# Patient Record
Sex: FEMALE | Race: WHITE | Employment: OTHER | URBAN - METROPOLITAN AREA
[De-identification: names, ages, dates, MRNs, and addresses within clinical notes are randomized per-mention and may not be internally consistent; named-entity substitution may affect disease eponyms.]

---

## 2017-05-16 ENCOUNTER — HOSPITAL ENCOUNTER (OUTPATIENT)
Dept: NON INVASIVE DIAGNOSTICS | Age: 77
Discharge: HOME | End: 2017-05-16
Attending: INTERNAL MEDICINE | Admitting: INTERNAL MEDICINE

## 2017-05-16 DIAGNOSIS — R68.89 OTHER GENERAL SYMPTOMS AND SIGNS: ICD-10-CM

## 2017-05-25 LAB
ACQUISITION DURATION: NORMAL S
AVERAGE HEART RATE: 93 BPM
EKG DIAGNOSIS: NORMAL
FASTEST SUPRAVENTRICULAR RATE: 149 BPM
HOLTER MAX HEART RATE: 118 BPM
HOOKUP DATE: NORMAL
HOOKUP TIME: NORMAL
LONGEST SUPRAVENTRICULAR RATE: 149 BPM
Lab: NORMAL
MAX HEART RATE TIME/DATE: NORMAL
MIN HEART RATE TIME/DATE: NORMAL
MIN HEART RATE: 64 BPM
NUMBER OF FASTEST SUPRAVENTRICULAR BEATS: 13
NUMBER OF LONGEST SUPRAVENTRICULAR BEATS: 13
NUMBER OF QRS COMPLEXES: NORMAL
NUMBER OF SUPRAVENTRICULAR BEATS IN RUNS: 26
NUMBER OF SUPRAVENTRICULAR COUPLETS: 1
NUMBER OF SUPRAVENTRICULAR ECTOPICS: 234
NUMBER OF SUPRAVENTRICULAR ISOLATED BEATS: 206
NUMBER OF SUPRAVENTRICULAR RUNS: 3
NUMBER OF VENTRICULAR BEATS IN RUNS: 0
NUMBER OF VENTRICULAR BIGEMINAL CYCLES: 0
NUMBER OF VENTRICULAR COUPLETS: 4
NUMBER OF VENTRICULAR ECTOPICS: 128
NUMBER OF VENTRICULAR ISOLATED BEATS: 120
NUMBER OF VENTRICULAR RUNS: 0

## 2017-06-07 ENCOUNTER — HOSPITAL ENCOUNTER (OUTPATIENT)
Dept: MAMMOGRAPHY | Age: 77
Discharge: HOME | End: 2017-06-07
Attending: INTERNAL MEDICINE | Admitting: INTERNAL MEDICINE

## 2017-06-07 DIAGNOSIS — Z87.891 PERSONAL HISTORY OF NICOTINE DEPENDENCE: ICD-10-CM

## 2017-06-07 DIAGNOSIS — Z12.31 VISIT FOR SCREENING MAMMOGRAM: ICD-10-CM

## 2017-12-29 PROBLEM — J44.1 COPD EXACERBATION (HCC): Status: ACTIVE | Noted: 2017-12-29

## 2018-04-30 ENCOUNTER — HOSPITAL ENCOUNTER (OUTPATIENT)
Dept: GENERAL RADIOLOGY | Age: 78
Discharge: HOME | End: 2018-04-30

## 2018-04-30 DIAGNOSIS — J44.9 CHRONIC OBSTRUCTIVE PULMONARY DISEASE, UNSPECIFIED COPD TYPE (HCC): ICD-10-CM

## 2018-07-17 ENCOUNTER — HOSPITAL ENCOUNTER (EMERGENCY)
Age: 78
Discharge: HOME | End: 2018-07-17
Payer: MEDICARE

## 2018-07-17 VITALS
RESPIRATION RATE: 17 BRPM | SYSTOLIC BLOOD PRESSURE: 177 MMHG | TEMPERATURE: 96.6 F | DIASTOLIC BLOOD PRESSURE: 91 MMHG | HEART RATE: 110 BPM | OXYGEN SATURATION: 95 % | BODY MASS INDEX: 19.81 KG/M2 | WEIGHT: 116 LBS | HEIGHT: 64 IN

## 2018-07-17 DIAGNOSIS — S81.811A SKIN TEAR OF RIGHT LOWER LEG WITHOUT COMPLICATION, INITIAL ENCOUNTER: Primary | ICD-10-CM

## 2018-07-17 PROCEDURE — 6360000002 HC RX W HCPCS: Performed by: NURSE PRACTITIONER

## 2018-07-17 PROCEDURE — 90715 TDAP VACCINE 7 YRS/> IM: CPT | Performed by: NURSE PRACTITIONER

## 2018-07-17 PROCEDURE — 4500000022 HC ED LEVEL 2 PROCEDURE

## 2018-07-17 PROCEDURE — 90471 IMMUNIZATION ADMIN: CPT | Performed by: NURSE PRACTITIONER

## 2018-07-17 PROCEDURE — 99282 EMERGENCY DEPT VISIT SF MDM: CPT

## 2018-07-17 RX ORDER — PREDNISONE 20 MG/1
20 TABLET ORAL DAILY
COMMUNITY
End: 2019-03-18 | Stop reason: ALTCHOICE

## 2018-07-17 RX ORDER — SULFAMETHOXAZOLE AND TRIMETHOPRIM 400; 80 MG/1; MG/1
1 TABLET ORAL 2 TIMES DAILY
COMMUNITY
End: 2019-03-18 | Stop reason: ALTCHOICE

## 2018-07-17 RX ADMIN — TETANUS TOXOID, REDUCED DIPHTHERIA TOXOID AND ACELLULAR PERTUSSIS VACCINE, ADSORBED 0.5 ML: 5; 2.5; 8; 8; 2.5 SUSPENSION INTRAMUSCULAR at 19:11

## 2018-07-17 ASSESSMENT — PAIN DESCRIPTION - LOCATION: LOCATION: LEG

## 2018-07-17 ASSESSMENT — PAIN DESCRIPTION - DESCRIPTORS: DESCRIPTORS: BURNING

## 2018-07-17 ASSESSMENT — PAIN DESCRIPTION - PAIN TYPE: TYPE: ACUTE PAIN

## 2018-07-17 ASSESSMENT — PAIN SCALES - GENERAL
PAINLEVEL_OUTOF10: 2
PAINLEVEL_OUTOF10: 10

## 2018-07-17 ASSESSMENT — PAIN DESCRIPTION - ORIENTATION: ORIENTATION: RIGHT

## 2018-07-17 NOTE — ED PROVIDER NOTES
possible for a visit in 3 days  For follow up care, For wound re-check    [place] ([place] [facility] ED  [address] [facility]  [phone]  Go to   As needed, If symptoms worsen      DISCHARGE MEDICATIONS:  New Prescriptions    MUPIROCIN (BACTROBAN) 2 % OINTMENT    Apply topically 3 times daily. 22 gram tube generic OK.        DISCONTINUED MEDICATIONS:  Discontinued Medications    No medications on file              (Please note the MDM and HPI sections of this note were completed with a voice recognition program.  Efforts were made to edit the dictations but occasionally words are mis-transcribed.)    Electronically signed, CLIFTON Bell CNP,        CLIFTON Bell CNP  07/17/18 0013

## 2019-02-18 ENCOUNTER — HOSPITAL ENCOUNTER (OUTPATIENT)
Dept: GENERAL RADIOLOGY | Age: 79
Discharge: HOME | End: 2019-02-18
Payer: MEDICARE

## 2019-02-18 DIAGNOSIS — J44.9 CHRONIC OBSTRUCTIVE PULMONARY DISEASE, UNSPECIFIED COPD TYPE (HCC): ICD-10-CM

## 2019-02-18 PROCEDURE — 71046 X-RAY EXAM CHEST 2 VIEWS: CPT

## 2019-03-18 ENCOUNTER — HOSPITAL ENCOUNTER (INPATIENT)
Age: 79
LOS: 3 days | Discharge: HOME | DRG: 871 | End: 2019-03-21
Attending: EMERGENCY MEDICINE | Admitting: INTERNAL MEDICINE
Payer: MEDICARE

## 2019-03-18 ENCOUNTER — APPOINTMENT (OUTPATIENT)
Dept: GENERAL RADIOLOGY | Age: 79
DRG: 871 | End: 2019-03-18
Payer: MEDICARE

## 2019-03-18 DIAGNOSIS — J18.9 PNEUMONIA DUE TO ORGANISM: Primary | ICD-10-CM

## 2019-03-18 DIAGNOSIS — J44.1 COPD EXACERBATION (HCC): ICD-10-CM

## 2019-03-18 PROBLEM — A41.9 SEPSIS (HCC): Status: ACTIVE | Noted: 2019-03-18

## 2019-03-18 LAB
A/G RATIO: 1.1 (ref 1.1–2.2)
ALBUMIN SERPL-MCNC: 3.7 G/DL (ref 3.4–5)
ALP BLD-CCNC: 71 U/L (ref 40–129)
ALT SERPL-CCNC: 12 U/L (ref 10–40)
ANION GAP SERPL CALCULATED.3IONS-SCNC: 9 MMOL/L (ref 3–16)
AST SERPL-CCNC: 14 U/L (ref 15–37)
BASOPHILS ABSOLUTE: 0 K/UL (ref 0–0.2)
BASOPHILS RELATIVE PERCENT: 0.5 %
BILIRUB SERPL-MCNC: 0.3 MG/DL (ref 0–1)
BUN BLDV-MCNC: 25 MG/DL (ref 7–20)
CALCIUM SERPL-MCNC: 10.2 MG/DL (ref 8.3–10.6)
CHLORIDE BLD-SCNC: 96 MMOL/L (ref 99–110)
CO2: 33 MMOL/L (ref 21–32)
CREAT SERPL-MCNC: 1 MG/DL (ref 0.6–1.2)
EKG ATRIAL RATE: 109 BPM
EKG DIAGNOSIS: NORMAL
EKG P AXIS: 85 DEGREES
EKG P-R INTERVAL: 124 MS
EKG Q-T INTERVAL: 338 MS
EKG QRS DURATION: 82 MS
EKG QTC CALCULATION (BAZETT): 455 MS
EKG R AXIS: -49 DEGREES
EKG T AXIS: 86 DEGREES
EKG VENTRICULAR RATE: 109 BPM
EOSINOPHILS ABSOLUTE: 0.1 K/UL (ref 0–0.6)
EOSINOPHILS RELATIVE PERCENT: 0.8 %
GFR AFRICAN AMERICAN: >60
GFR NON-AFRICAN AMERICAN: 54
GLOBULIN: 3.3 G/DL
GLUCOSE BLD-MCNC: 144 MG/DL (ref 70–99)
HCT VFR BLD CALC: 47 % (ref 36–48)
HEMOGLOBIN: 15.7 G/DL (ref 12–16)
LACTIC ACID: 1.3 MMOL/L (ref 0.4–2)
LYMPHOCYTES ABSOLUTE: 0.8 K/UL (ref 1–5.1)
LYMPHOCYTES RELATIVE PERCENT: 8.1 %
MCH RBC QN AUTO: 31.7 PG (ref 26–34)
MCHC RBC AUTO-ENTMCNC: 33.3 G/DL (ref 31–36)
MCV RBC AUTO: 95.1 FL (ref 80–100)
MONOCYTES ABSOLUTE: 0.3 K/UL (ref 0–1.3)
MONOCYTES RELATIVE PERCENT: 3.4 %
NEUTROPHILS ABSOLUTE: 8.5 K/UL (ref 1.7–7.7)
NEUTROPHILS RELATIVE PERCENT: 87.2 %
PDW BLD-RTO: 14.5 % (ref 12.4–15.4)
PLATELET # BLD: 246 K/UL (ref 135–450)
PMV BLD AUTO: 8.1 FL (ref 5–10.5)
POTASSIUM REFLEX MAGNESIUM: 4.6 MMOL/L (ref 3.5–5.1)
PRO-BNP: 433 PG/ML (ref 0–449)
PROCALCITONIN: 0.13 NG/ML (ref 0–0.15)
RAPID INFLUENZA  B AGN: NEGATIVE
RAPID INFLUENZA A AGN: NEGATIVE
RBC # BLD: 4.94 M/UL (ref 4–5.2)
SODIUM BLD-SCNC: 138 MMOL/L (ref 136–145)
TOTAL PROTEIN: 7 G/DL (ref 6.4–8.2)
TROPONIN: <0.01 NG/ML
WBC # BLD: 9.7 K/UL (ref 4–11)

## 2019-03-18 PROCEDURE — 6370000000 HC RX 637 (ALT 250 FOR IP): Performed by: INTERNAL MEDICINE

## 2019-03-18 PROCEDURE — 2700000000 HC OXYGEN THERAPY PER DAY

## 2019-03-18 PROCEDURE — 36415 COLL VENOUS BLD VENIPUNCTURE: CPT

## 2019-03-18 PROCEDURE — 80053 COMPREHEN METABOLIC PANEL: CPT

## 2019-03-18 PROCEDURE — 93010 ELECTROCARDIOGRAM REPORT: CPT | Performed by: INTERNAL MEDICINE

## 2019-03-18 PROCEDURE — 83605 ASSAY OF LACTIC ACID: CPT

## 2019-03-18 PROCEDURE — 2580000003 HC RX 258: Performed by: INTERNAL MEDICINE

## 2019-03-18 PROCEDURE — 99285 EMERGENCY DEPT VISIT HI MDM: CPT

## 2019-03-18 PROCEDURE — 94150 VITAL CAPACITY TEST: CPT

## 2019-03-18 PROCEDURE — 84484 ASSAY OF TROPONIN QUANT: CPT

## 2019-03-18 PROCEDURE — 87804 INFLUENZA ASSAY W/OPTIC: CPT

## 2019-03-18 PROCEDURE — 93005 ELECTROCARDIOGRAM TRACING: CPT | Performed by: PHYSICIAN ASSISTANT

## 2019-03-18 PROCEDURE — 85025 COMPLETE CBC W/AUTO DIFF WBC: CPT

## 2019-03-18 PROCEDURE — 94640 AIRWAY INHALATION TREATMENT: CPT

## 2019-03-18 PROCEDURE — 83880 ASSAY OF NATRIURETIC PEPTIDE: CPT

## 2019-03-18 PROCEDURE — 84443 ASSAY THYROID STIM HORMONE: CPT

## 2019-03-18 PROCEDURE — 94761 N-INVAS EAR/PLS OXIMETRY MLT: CPT

## 2019-03-18 PROCEDURE — 1200000000 HC SEMI PRIVATE

## 2019-03-18 PROCEDURE — 2580000003 HC RX 258: Performed by: EMERGENCY MEDICINE

## 2019-03-18 PROCEDURE — 71046 X-RAY EXAM CHEST 2 VIEWS: CPT

## 2019-03-18 PROCEDURE — 87040 BLOOD CULTURE FOR BACTERIA: CPT

## 2019-03-18 PROCEDURE — 6360000002 HC RX W HCPCS: Performed by: EMERGENCY MEDICINE

## 2019-03-18 PROCEDURE — 6370000000 HC RX 637 (ALT 250 FOR IP): Performed by: EMERGENCY MEDICINE

## 2019-03-18 PROCEDURE — 84145 PROCALCITONIN (PCT): CPT

## 2019-03-18 PROCEDURE — 96374 THER/PROPH/DIAG INJ IV PUSH: CPT

## 2019-03-18 RX ORDER — SODIUM CHLORIDE 9 MG/ML
INJECTION, SOLUTION INTRAVENOUS CONTINUOUS
Status: DISCONTINUED | OUTPATIENT
Start: 2019-03-18 | End: 2019-03-19

## 2019-03-18 RX ORDER — METHYLPREDNISOLONE SODIUM SUCCINATE 125 MG/2ML
125 INJECTION, POWDER, LYOPHILIZED, FOR SOLUTION INTRAMUSCULAR; INTRAVENOUS ONCE
Status: COMPLETED | OUTPATIENT
Start: 2019-03-18 | End: 2019-03-18

## 2019-03-18 RX ORDER — IPRATROPIUM BROMIDE AND ALBUTEROL SULFATE 2.5; .5 MG/3ML; MG/3ML
1 SOLUTION RESPIRATORY (INHALATION) ONCE
Status: COMPLETED | OUTPATIENT
Start: 2019-03-18 | End: 2019-03-18

## 2019-03-18 RX ORDER — SODIUM CHLORIDE 0.9 % (FLUSH) 0.9 %
10 SYRINGE (ML) INJECTION EVERY 12 HOURS SCHEDULED
Status: DISCONTINUED | OUTPATIENT
Start: 2019-03-18 | End: 2019-03-21 | Stop reason: HOSPADM

## 2019-03-18 RX ORDER — LEVOTHYROXINE SODIUM 0.03 MG/1
25 TABLET ORAL DAILY
COMMUNITY
End: 2019-09-03 | Stop reason: ALTCHOICE

## 2019-03-18 RX ORDER — SIMVASTATIN 10 MG
20 TABLET ORAL NIGHTLY
Status: DISCONTINUED | OUTPATIENT
Start: 2019-03-18 | End: 2019-03-21 | Stop reason: HOSPADM

## 2019-03-18 RX ORDER — LEVOTHYROXINE SODIUM 0.03 MG/1
25 TABLET ORAL DAILY
Status: DISCONTINUED | OUTPATIENT
Start: 2019-03-19 | End: 2019-03-21 | Stop reason: HOSPADM

## 2019-03-18 RX ORDER — NICOTINE 21 MG/24HR
1 PATCH, TRANSDERMAL 24 HOURS TRANSDERMAL DAILY
Status: DISCONTINUED | OUTPATIENT
Start: 2019-03-18 | End: 2019-03-21 | Stop reason: HOSPADM

## 2019-03-18 RX ORDER — BUDESONIDE AND FORMOTEROL FUMARATE DIHYDRATE 160; 4.5 UG/1; UG/1
2 AEROSOL RESPIRATORY (INHALATION)
Status: ON HOLD | COMMUNITY
Start: 2016-06-26 | End: 2019-03-18

## 2019-03-18 RX ORDER — PREDNISONE 10 MG/1
10 TABLET ORAL DAILY
COMMUNITY
Start: 2016-07-25 | End: 2019-09-03 | Stop reason: ALTCHOICE

## 2019-03-18 RX ORDER — IPRATROPIUM BROMIDE AND ALBUTEROL SULFATE 2.5; .5 MG/3ML; MG/3ML
1 SOLUTION RESPIRATORY (INHALATION)
Status: DISCONTINUED | OUTPATIENT
Start: 2019-03-19 | End: 2019-03-19

## 2019-03-18 RX ORDER — ALBUTEROL SULFATE 2.5 MG/3ML
SOLUTION RESPIRATORY (INHALATION)
Refills: 2 | Status: ON HOLD | COMMUNITY
Start: 2019-03-04 | End: 2019-09-07 | Stop reason: HOSPADM

## 2019-03-18 RX ORDER — SODIUM CHLORIDE 0.9 % (FLUSH) 0.9 %
10 SYRINGE (ML) INJECTION PRN
Status: DISCONTINUED | OUTPATIENT
Start: 2019-03-18 | End: 2019-03-21 | Stop reason: HOSPADM

## 2019-03-18 RX ORDER — 0.9 % SODIUM CHLORIDE 0.9 %
1000 INTRAVENOUS SOLUTION INTRAVENOUS ONCE
Status: COMPLETED | OUTPATIENT
Start: 2019-03-18 | End: 2019-03-18

## 2019-03-18 RX ORDER — SIMVASTATIN 20 MG
20 TABLET ORAL NIGHTLY
COMMUNITY
End: 2019-09-03 | Stop reason: ALTCHOICE

## 2019-03-18 RX ORDER — ALBUTEROL SULFATE 2.5 MG/3ML
2.5 SOLUTION RESPIRATORY (INHALATION)
Status: DISCONTINUED | OUTPATIENT
Start: 2019-03-18 | End: 2019-03-21 | Stop reason: HOSPADM

## 2019-03-18 RX ORDER — ONDANSETRON 2 MG/ML
4 INJECTION INTRAMUSCULAR; INTRAVENOUS EVERY 6 HOURS PRN
Status: DISCONTINUED | OUTPATIENT
Start: 2019-03-18 | End: 2019-03-21 | Stop reason: HOSPADM

## 2019-03-18 RX ORDER — BUSPIRONE HYDROCHLORIDE 10 MG/1
10 TABLET ORAL DAILY PRN
Refills: 4 | COMMUNITY
Start: 2019-03-07 | End: 2019-09-03 | Stop reason: ALTCHOICE

## 2019-03-18 RX ORDER — LABETALOL 100 MG/1
50 TABLET, FILM COATED ORAL EVERY 6 HOURS PRN
Status: DISCONTINUED | OUTPATIENT
Start: 2019-03-18 | End: 2019-03-21 | Stop reason: HOSPADM

## 2019-03-18 RX ORDER — PREDNISONE 10 MG/1
10 TABLET ORAL DAILY
Status: DISCONTINUED | OUTPATIENT
Start: 2019-03-19 | End: 2019-03-19

## 2019-03-18 RX ORDER — METHYLPREDNISOLONE SODIUM SUCCINATE 40 MG/ML
40 INJECTION, POWDER, LYOPHILIZED, FOR SOLUTION INTRAMUSCULAR; INTRAVENOUS EVERY 6 HOURS
Status: DISCONTINUED | OUTPATIENT
Start: 2019-03-19 | End: 2019-03-19

## 2019-03-18 RX ORDER — PREDNISONE 20 MG/1
40 TABLET ORAL DAILY
Status: DISCONTINUED | OUTPATIENT
Start: 2019-03-21 | End: 2019-03-19

## 2019-03-18 RX ORDER — LABETALOL 100 MG/1
50 TABLET, FILM COATED ORAL ONCE
Status: COMPLETED | OUTPATIENT
Start: 2019-03-18 | End: 2019-03-18

## 2019-03-18 RX ADMIN — CEFTRIAXONE SODIUM 1 G: 1 INJECTION, POWDER, FOR SOLUTION INTRAMUSCULAR; INTRAVENOUS at 19:41

## 2019-03-18 RX ADMIN — METHYLPREDNISOLONE SODIUM SUCCINATE 125 MG: 125 INJECTION, POWDER, FOR SOLUTION INTRAMUSCULAR; INTRAVENOUS at 19:42

## 2019-03-18 RX ADMIN — IPRATROPIUM BROMIDE AND ALBUTEROL SULFATE 1 AMPULE: .5; 2.5 SOLUTION RESPIRATORY (INHALATION) at 19:41

## 2019-03-18 RX ADMIN — SIMVASTATIN 20 MG: 10 TABLET, FILM COATED ORAL at 23:02

## 2019-03-18 RX ADMIN — LABETALOL HCL 50 MG: 100 TABLET, FILM COATED ORAL at 20:26

## 2019-03-18 RX ADMIN — AZITHROMYCIN 500 MG: 500 INJECTION, POWDER, LYOPHILIZED, FOR SOLUTION INTRAVENOUS at 20:25

## 2019-03-18 RX ADMIN — SODIUM CHLORIDE 1000 ML: 9 INJECTION, SOLUTION INTRAVENOUS at 20:25

## 2019-03-18 RX ADMIN — SODIUM CHLORIDE: 9 INJECTION, SOLUTION INTRAVENOUS at 22:58

## 2019-03-18 RX ADMIN — Medication 10 ML: at 22:58

## 2019-03-18 RX ADMIN — IPRATROPIUM BROMIDE AND ALBUTEROL SULFATE 1 AMPULE: .5; 3 SOLUTION RESPIRATORY (INHALATION) at 23:08

## 2019-03-18 ASSESSMENT — PAIN DESCRIPTION - LOCATION: LOCATION: ABDOMEN;CHEST

## 2019-03-18 ASSESSMENT — PAIN SCALES - GENERAL: PAINLEVEL_OUTOF10: 2

## 2019-03-18 ASSESSMENT — PAIN DESCRIPTION - PAIN TYPE: TYPE: ACUTE PAIN

## 2019-03-18 NOTE — ED PROVIDER NOTES
Triage Chief Complaint:   Shortness of Breath (Hx of COPD  worse starting 2 weeks ago )      [place]:  José Dior is a 69 y.o. female that presents to the emergency department with COPD and shortness of breath. She states she has been having worsening shortness of breath for the last 2 weeks. She's had a productive cough. She denies fever or chills. She has seen her primary care physician for this. She's been on 2 separate antibiotics and finished Levaquin several days ago. She is also been on steroids and has finished that dose as well. She states now she feels short of breath when she walks even a short distance. She denies leg swelling. She denies fever or chills. She wears 2 L of oxygen continuously for history of COPD. She states she still smokes \"just a few cigarettes\" each day. .    Past Medical History:   Diagnosis Date    Asthma     COPD [facility]chronic obstructive pulmonary disease) ([facility] [address]     Hyperlipidemia     Hypertension     OA (osteoarthritis) 8/12/2014     Past Surgical History:   Procedure Laterality Date    BREAST SURGERY      half of right breast removed     Family History   Problem Relation Age of Onset    Cancer Mother     Heart Disease Father     Stroke Father     Heart Disease Sister     Stroke Sister      Social History     Socioeconomic History    Marital status:       Spouse name: Not on file    Number of children: Not on file    Years of education: Not on file    Highest education level: Not on file   Occupational History    Not on file   Social Needs    Financial resource strain: Not on file    Food insecurity:     Worry: Not on file     Inability: Not on file    Transportation needs:     Medical: Not on file     Non-medical: Not on file   Tobacco Use    Smoking status: Current Every Day Smoker     Packs/day: 0.50     Years: 50.00     Pack years: 25.00     Types: Cigarettes    Smokeless tobacco: Never Used    Tobacco comment: advised to quit   Substance and Sexual Activity    Alcohol use: No    Drug use: No    Sexual activity: Not Currently     Comment: tabacco- Pk Day   Lifestyle    Physical activity:     Days per week: Not on file     Minutes per session: Not on file    Stress: Not on file   Relationships    Social connections:     Talks on phone: Not on file     Gets together: Not on file     Attends Synagogue service: Not on file     Active member of club or organization: Not on file     Attends meetings of clubs or organizations: Not on file     Relationship status: Not on file    Intimate partner violence:     Fear of current or ex partner: Not on file     Emotionally abused: Not on file     Physically abused: Not on file     Forced sexual activity: Not on file   Other Topics Concern    Not on file   Social History Narrative    Not on file     Current Facility-Administered Medications   Medication Dose Route Frequency Provider Last Rate Last Dose    cefTRIAXone (ROCEPHIN) 1 g IVPB in 50 mL D5W minibag  1 g Intravenous Q24H Breonna Scott  mL/hr at 03/1941 1 g at 03/1941    azithromycin (ZITHROMAX) 500 mg in D5W 250ml addavial  500 mg Intravenous Once Breonna Scott MD        0.9 % sodium chloride bolus  1,000 mL Intravenous Once Breonna Scott MD        labetalol (NORMODYNE) tablet 50 mg  50 mg Oral Q6H PRN Kush Aguirre MD        labetalol (NORMODYNE) tablet 50 mg  50 mg Oral Once Kush Aguirre MD         Current Outpatient Medications   Medication Sig Dispense Refill    busPIRone (BUSPAR) 5 MG tablet Take 5 mg by mouth daily      ipratropium-albuterol (DUONEB) 0.5-2.5 (3) MG/3ML SOLN nebulizer solution Inhale 3 mLs into the lungs every 4 hours J 44.1 360 mL 0    budesonide-formoterol (SYMBICORT) 160-4.5 MCG/ACT AERO Inhale 2 puffs into the lungs 2 times daily      albuterol (PROVENTIL HFA) 108 (90 BASE) MCG/ACT inhaler Inhale 2 puffs into the lungs every 6 hours as needed for Wheezing or Shortness of Breath. 1 Inhaler 2     Allergies   Allergen Reactions    Codeine      \"hallucinations\"     Nursing Notes Reviewed    ROS:  At least 10 systems reviewed and otherwise negative except as in the [address] Physical Exam:  ED Triage Vitals [03/18/19 1806]   Enc Vitals Group      BP (!) 163/103      Pulse 115      Resp 25      Temp 97.7 °F (36.5 °C)      Temp Source Oral      SpO2 97 %      Weight 94 lb (42.6 kg)      Height 5' 4\" (1.626 m)      Head Circumference       Peak Flow       Pain Score       Pain Loc       Pain Edu? Excl. in [address]? My pulse oximetry interpretation is which is within the normal range    GENERAL APPEARANCE: Awake and alert. Cooperative. No acute distress. HEAD:  Atraumatic. EYES: EOM's grossly intact. ENT: Mucous membranes are moist.  No trismus. NECK:  Trachea midline. HEART: tachycardia. Radial pulses 2+. LUNGS: Respirations unlabored. Wheezing bilaterally with rhonchi in right lung  ABDOMEN: Soft. Non-tender. No guarding or rebound. EXTREMITIES: No acute deformities. SKIN: Warm and dry. NEUROLOGICAL: No gross facial drooping. Moves all 4 extremities spontaneously. PSYCHIATRIC: Normal mood.     I have reviewed and interpreted all of the currently available lab results from this visit (if applicable):  Results for orders placed or performed during the hospital encounter of 03/18/19   Rapid influenza A/B antigens   Result Value Ref Range    Rapid Influenza A Ag Negative Negative    Rapid Influenza B Ag Negative Negative   CBC auto differential   Result Value Ref Range    WBC 9.7 4.0 - 11.0 K/uL    RBC 4.94 4.00 - 5.20 M/uL    Hemoglobin 15.7 12.0 - 16.0 g/dL    Hematocrit 47.0 36.0 - 48.0 %    MCV 95.1 80.0 - 100.0 fL    MCH 31.7 26.0 - 34.0 pg    MCHC 33.3 31.0 - 36.0 g/dL    RDW 14.5 12.4 - 15.4 %    Platelets 165 040 - 642 K/uL    MPV 8.1 5.0 - 10.5 fL    Neutrophils % 87.2 %    Lymphocytes % 8.1 %    Monocytes % 3.4 %    Eosinophils % 0.8 %    Basophils % 0.5 %    Neutrophils # 8.5 (H) 1.7 - 7.7 K/uL    Lymphocytes # 0.8 (L) 1.0 - 5.1 K/uL    Monocytes # 0.3 0.0 - 1.3 K/uL    Eosinophils # 0.1 0.0 - 0.6 K/uL    Basophils # 0.0 0.0 - 0.2 K/uL   Comprehensive Metabolic Panel w/ Reflex to MG   Result Value Ref Range    Sodium 138 136 - 145 mmol/L    Potassium reflex Magnesium 4.6 3.5 - 5.1 mmol/L    Chloride 96 (L) 99 - 110 mmol/L    CO2 33 (H) 21 - 32 mmol/L    Anion Gap 9 3 - 16    Glucose 144 (H) 70 - 99 mg/dL    BUN 25 (H) 7 - 20 mg/dL    CREATININE 1.0 0.6 - 1.2 mg/dL    GFR Non-African American 54 (A) >60    GFR African American >60 >60    Calcium 10.2 8.3 - 10.6 mg/dL    Total Protein 7.0 6.4 - 8.2 g/dL    Alb 3.7 3.4 - 5.0 g/dL    Albumin/Globulin Ratio 1.1 1.1 - 2.2    Total Bilirubin 0.3 0.0 - 1.0 mg/dL    Alkaline Phosphatase 71 40 - 129 U/L    ALT 12 10 - 40 U/L    AST 14 (L) 15 - 37 U/L    Globulin 3.3 g/dL   Troponin   Result Value Ref Range    Troponin <0.01 <0.01 ng/mL   Brain Natriuretic Peptide   Result Value Ref Range    Pro- 0 - 449 pg/mL   EKG 12 Lead   Result Value Ref Range    Ventricular Rate 109 BPM    Atrial Rate 109 BPM    P-R Interval 124 ms    QRS Duration 82 ms    Q-T Interval 338 ms    QTc Calculation (Bazett) 455 ms    P Axis 85 degrees    R Axis -49 degrees    T Axis 86 degrees    Diagnosis       Sinus tachycardia with Fusion complexesBiatrial enlargementLeft axis deviationPulmonary disease patternRSR' or QR pattern in V1 suggests right ventricular conduction delaySeptal infarct , age undeterminedT wave abnormality, consider lateral ischemiaAbnormal ECGNo previous ECGs available          EKG: (All EKG's are interpreted by myself in the absence of a cardiologist)  12 lead EKG:  Sinus Tachycardia 109  Axis is   Left axis deviation  QTc is  normal  There is no specific ST-T wave changes appreciated. There is no clear evidence of acute ischemia or infarction. It was compared against an EKG from 5/25/18. MDM:  Patient is slightly tachycardic.   She is afebrile. She has wheezing and rhonchi on my exam.  I gave her breathing treatments and IV steroids. Chest x-ray shows a concern for pneumonia. She has not been recently limited to the hospital.  I've ordered IV antibiotics. She is flu negative. I've ordered IV fluids as well. I've spoken with the hospitalist.  Patient will be admitted at this time for pneumonia outpatient management as well as sepsis    Clinical Impression:  1. Pneumonia due to organism    2.  COPD exacerbation ([facility] [address]        Disposition Vitals:  [unfilled], [unfilled], [unfilled], [unfilled]    Disposition referral (if applicable):  Nicanor Smith MD  [address] [place] [address]  [phone]            Disposition medications (if applicable):  New Prescriptions    No medications on file         (Please note that portions of this note may have been completed with a voice recognition program. Efforts were made to edit the dictations but occasionally words are mis-transcribed.)    MD Pranav Roca MD  03/18/19 2004

## 2019-03-18 NOTE — ED NOTES
Perfect serve sent to Dr. Charlotte Ruvalcaba at 1560. Vernadine Shed  03/18/19 1927  Dr. Charlotte Ruvalcaba returned call at [address] [place] [address] and spoke with Dr. Ang Castillo regarding this patient.       Vernadine Shed  03/18/19 1949

## 2019-03-19 PROBLEM — E44.0 MODERATE PROTEIN-CALORIE MALNUTRITION (HCC): Status: ACTIVE | Noted: 2019-03-19

## 2019-03-19 PROBLEM — J18.9 PNEUMONIA: Status: ACTIVE | Noted: 2019-03-19

## 2019-03-19 LAB
A/G RATIO: 1.1 (ref 1.1–2.2)
ALBUMIN SERPL-MCNC: 3 G/DL (ref 3.4–5)
ALP BLD-CCNC: 54 U/L (ref 40–129)
ALT SERPL-CCNC: 23 U/L (ref 10–40)
ANION GAP SERPL CALCULATED.3IONS-SCNC: 9 MMOL/L (ref 3–16)
AST SERPL-CCNC: 24 U/L (ref 15–37)
BASOPHILS ABSOLUTE: 0 K/UL (ref 0–0.2)
BASOPHILS RELATIVE PERCENT: 0 %
BILIRUB SERPL-MCNC: 0.3 MG/DL (ref 0–1)
BUN BLDV-MCNC: 24 MG/DL (ref 7–20)
CALCIUM SERPL-MCNC: 8.6 MG/DL (ref 8.3–10.6)
CHLORIDE BLD-SCNC: 106 MMOL/L (ref 99–110)
CO2: 25 MMOL/L (ref 21–32)
CREAT SERPL-MCNC: 0.8 MG/DL (ref 0.6–1.2)
EOSINOPHILS ABSOLUTE: 0 K/UL (ref 0–0.6)
EOSINOPHILS RELATIVE PERCENT: 0.1 %
GFR AFRICAN AMERICAN: >60
GFR NON-AFRICAN AMERICAN: >60
GLOBULIN: 2.7 G/DL
GLUCOSE BLD-MCNC: 161 MG/DL (ref 70–99)
HCT VFR BLD CALC: 39 % (ref 36–48)
HEMOGLOBIN: 13 G/DL (ref 12–16)
LYMPHOCYTES ABSOLUTE: 0.3 K/UL (ref 1–5.1)
LYMPHOCYTES RELATIVE PERCENT: 6.6 %
MCH RBC QN AUTO: 31.9 PG (ref 26–34)
MCHC RBC AUTO-ENTMCNC: 33.5 G/DL (ref 31–36)
MCV RBC AUTO: 95.3 FL (ref 80–100)
MONOCYTES ABSOLUTE: 0 K/UL (ref 0–1.3)
MONOCYTES RELATIVE PERCENT: 0.4 %
NEUTROPHILS ABSOLUTE: 4.9 K/UL (ref 1.7–7.7)
NEUTROPHILS RELATIVE PERCENT: 92.9 %
PDW BLD-RTO: 14.3 % (ref 12.4–15.4)
PLATELET # BLD: 202 K/UL (ref 135–450)
PMV BLD AUTO: 8.4 FL (ref 5–10.5)
POTASSIUM REFLEX MAGNESIUM: 4.6 MMOL/L (ref 3.5–5.1)
RBC # BLD: 4.09 M/UL (ref 4–5.2)
SODIUM BLD-SCNC: 140 MMOL/L (ref 136–145)
TOTAL PROTEIN: 5.7 G/DL (ref 6.4–8.2)
TSH REFLEX: 2.34 UIU/ML (ref 0.27–4.2)
WBC # BLD: 5.2 K/UL (ref 4–11)

## 2019-03-19 PROCEDURE — 92610 EVALUATE SWALLOWING FUNCTION: CPT

## 2019-03-19 PROCEDURE — 6370000000 HC RX 637 (ALT 250 FOR IP): Performed by: INTERNAL MEDICINE

## 2019-03-19 PROCEDURE — 92526 ORAL FUNCTION THERAPY: CPT

## 2019-03-19 PROCEDURE — 94668 MNPJ CHEST WALL SBSQ: CPT

## 2019-03-19 PROCEDURE — 1200000000 HC SEMI PRIVATE

## 2019-03-19 PROCEDURE — 2580000003 HC RX 258: Performed by: INTERNAL MEDICINE

## 2019-03-19 PROCEDURE — 80053 COMPREHEN METABOLIC PANEL: CPT

## 2019-03-19 PROCEDURE — 94762 N-INVAS EAR/PLS OXIMTRY CONT: CPT

## 2019-03-19 PROCEDURE — 94640 AIRWAY INHALATION TREATMENT: CPT

## 2019-03-19 PROCEDURE — 90686 IIV4 VACC NO PRSV 0.5 ML IM: CPT | Performed by: INTERNAL MEDICINE

## 2019-03-19 PROCEDURE — 94667 MNPJ CHEST WALL 1ST: CPT

## 2019-03-19 PROCEDURE — 99222 1ST HOSP IP/OBS MODERATE 55: CPT | Performed by: INTERNAL MEDICINE

## 2019-03-19 PROCEDURE — 99232 SBSQ HOSP IP/OBS MODERATE 35: CPT | Performed by: INTERNAL MEDICINE

## 2019-03-19 PROCEDURE — 6360000002 HC RX W HCPCS: Performed by: INTERNAL MEDICINE

## 2019-03-19 PROCEDURE — 36415 COLL VENOUS BLD VENIPUNCTURE: CPT

## 2019-03-19 PROCEDURE — G0008 ADMIN INFLUENZA VIRUS VAC: HCPCS | Performed by: INTERNAL MEDICINE

## 2019-03-19 PROCEDURE — 2700000000 HC OXYGEN THERAPY PER DAY

## 2019-03-19 PROCEDURE — 85025 COMPLETE CBC W/AUTO DIFF WBC: CPT

## 2019-03-19 RX ORDER — METHYLPREDNISOLONE SODIUM SUCCINATE 40 MG/ML
40 INJECTION, POWDER, LYOPHILIZED, FOR SOLUTION INTRAMUSCULAR; INTRAVENOUS EVERY 12 HOURS
Status: DISCONTINUED | OUTPATIENT
Start: 2019-03-21 | End: 2019-03-20

## 2019-03-19 RX ORDER — IPRATROPIUM BROMIDE AND ALBUTEROL SULFATE 2.5; .5 MG/3ML; MG/3ML
1 SOLUTION RESPIRATORY (INHALATION) EVERY 4 HOURS
Status: DISCONTINUED | OUTPATIENT
Start: 2019-03-19 | End: 2019-03-21 | Stop reason: HOSPADM

## 2019-03-19 RX ORDER — GUAIFENESIN 600 MG/1
600 TABLET, EXTENDED RELEASE ORAL 2 TIMES DAILY
Status: DISCONTINUED | OUTPATIENT
Start: 2019-03-19 | End: 2019-03-21 | Stop reason: HOSPADM

## 2019-03-19 RX ORDER — PREDNISONE 20 MG/1
40 TABLET ORAL DAILY
Status: DISCONTINUED | OUTPATIENT
Start: 2019-03-22 | End: 2019-03-20

## 2019-03-19 RX ADMIN — METHYLPREDNISOLONE SODIUM SUCCINATE 40 MG: 40 INJECTION, POWDER, FOR SOLUTION INTRAMUSCULAR; INTRAVENOUS at 08:26

## 2019-03-19 RX ADMIN — METHYLPREDNISOLONE SODIUM SUCCINATE 40 MG: 40 INJECTION, POWDER, FOR SOLUTION INTRAMUSCULAR; INTRAVENOUS at 02:15

## 2019-03-19 RX ADMIN — GUAIFENESIN 600 MG: 600 TABLET, EXTENDED RELEASE ORAL at 21:30

## 2019-03-19 RX ADMIN — IPRATROPIUM BROMIDE AND ALBUTEROL SULFATE 1 AMPULE: .5; 3 SOLUTION RESPIRATORY (INHALATION) at 10:55

## 2019-03-19 RX ADMIN — ENOXAPARIN SODIUM 40 MG: 40 INJECTION SUBCUTANEOUS at 08:26

## 2019-03-19 RX ADMIN — IPRATROPIUM BROMIDE AND ALBUTEROL SULFATE 1 AMPULE: .5; 3 SOLUTION RESPIRATORY (INHALATION) at 19:39

## 2019-03-19 RX ADMIN — CEFTRIAXONE SODIUM 1 G: 1 INJECTION, POWDER, FOR SOLUTION INTRAMUSCULAR; INTRAVENOUS at 18:39

## 2019-03-19 RX ADMIN — Medication 10 ML: at 21:31

## 2019-03-19 RX ADMIN — SIMVASTATIN 20 MG: 10 TABLET, FILM COATED ORAL at 21:31

## 2019-03-19 RX ADMIN — ONDANSETRON 4 MG: 2 INJECTION INTRAMUSCULAR; INTRAVENOUS at 06:51

## 2019-03-19 RX ADMIN — IPRATROPIUM BROMIDE AND ALBUTEROL SULFATE 1 AMPULE: .5; 3 SOLUTION RESPIRATORY (INHALATION) at 14:48

## 2019-03-19 RX ADMIN — ONDANSETRON 4 MG: 2 INJECTION INTRAMUSCULAR; INTRAVENOUS at 18:43

## 2019-03-19 RX ADMIN — INFLUENZA A VIRUS A/MICHIGAN/45/2015 X-275 (H1N1) ANTIGEN (FORMALDEHYDE INACTIVATED), INFLUENZA A VIRUS A/SINGAPORE/INFIMH-16-0019/2016 IVR-186 (H3N2) ANTIGEN (FORMALDEHYDE INACTIVATED), INFLUENZA B VIRUS B/PHUKET/3073/2013 ANTIGEN (FORMALDEHYDE INACTIVATED), AND INFLUENZA B VIRUS B/MARYLAND/15/2016 BX-69A ANTIGEN (FORMALDEHYDE INACTIVATED) 0.5 ML: 15; 15; 15; 15 INJECTION, SUSPENSION INTRAMUSCULAR at 11:58

## 2019-03-19 RX ADMIN — LEVOTHYROXINE SODIUM 25 MCG: 25 TABLET ORAL at 08:26

## 2019-03-19 RX ADMIN — GUAIFENESIN 600 MG: 600 TABLET, EXTENDED RELEASE ORAL at 11:56

## 2019-03-19 RX ADMIN — AZITHROMYCIN MONOHYDRATE 500 MG: 500 INJECTION, POWDER, LYOPHILIZED, FOR SOLUTION INTRAVENOUS at 21:31

## 2019-03-19 RX ADMIN — SODIUM CHLORIDE: 9 INJECTION, SOLUTION INTRAVENOUS at 11:57

## 2019-03-19 RX ADMIN — IPRATROPIUM BROMIDE AND ALBUTEROL SULFATE 1 AMPULE: .5; 3 SOLUTION RESPIRATORY (INHALATION) at 23:13

## 2019-03-19 ASSESSMENT — PAIN SCALES - GENERAL: PAINLEVEL_OUTOF10: 0

## 2019-03-19 NOTE — FLOWSHEET NOTE
03/19/19 0234   Vital Signs   Temp 97 °F (36.1 °C)   Temp Source Oral   Pulse 75   Heart Rate Source Monitor   Resp 18   /74   BP Location Left upper arm   BP Upper/Lower Upper   Patient Position Semi fowlers   Level of Consciousness 0   MEWS Score 1   Height and Weight   Weight 103 lb 11.2 oz (47 kg)   Weight Method Actual;Bed scale   BMI (Calculated) 17.8   Oxygen Therapy   SpO2 96 %   O2 Device Nasal cannula   O2 Flow Rate (L/min) 2 L/min   resting in bed, no acute distress.  Anuj Salines

## 2019-03-19 NOTE — H&P
Hospital Medicine History & Physical      PCP: Molly Kramer MD    Date of Service: Pt seen/examined on 3/18/19 and admitted on 3/18/19 to Inpatient    Chief Complaint   Patient presents with    Shortness of Breath     Hx of COPD  worse starting 2 weeks ago        History Of Present Illness: The patient is a 69 y.o. female with PMH below, presents with SOB/ZENDEJAS, productive cough (yellow). She reports the above sx over the last 2 weeks. She normally wears 2L O2 at home on a PRN basis. She has been having to wear it constantly the last couple of days. She reports that her sx are exacerbated by even a few steps at this time. She has seen her PCP for this and has been placed on 2 separate courses of ABX (one was Levaquin and the other she does not know the name of). She says she completed both but she has not improved. Unfortunately she continues to smoke. Past Medical History:        Diagnosis Date    Asthma     COPD (chronic obstructive pulmonary disease) ([facility] [address]     Hyperlipidemia     Hypertension     OA (osteoarthritis) 2014       Past Surgical History:        Procedure Laterality Date    BREAST SURGERY      half of right breast removed     SECTION         Medications Prior to Admission:    Prior to Admission medications    Medication Sig Start Date End Date Taking?  Authorizing Provider   simvastatin (ZOCOR) 20 MG tablet Take 20 mg by mouth nightly    Yes Historical Provider, MD   levothyroxine (SYNTHROID) 25 MCG tablet Take 25 mcg by mouth Daily    Yes Historical Provider, MD   busPIRone (BUSPAR) 10 MG tablet Take 10 mg by mouth daily as needed 3/7/19  Yes Historical Provider, MD   predniSONE (DELTASONE) 10 MG tablet Take 10 mg by mouth daily 16  Yes Historical Provider, MD   tiotropium (SPIRIVA HANDIHALER) 18 MCG inhalation capsule Inhale 18 mcg into the lungs daily 16  Yes Historical Provider, MD   albuterol (PROVENTIL) (2.5 MG/3ML) 0.083% nebulizer solution VVN QID PRN SEVERE WHZ 3/4/19  Yes Historical Provider, MD   ipratropium-albuterol (DUONEB) 0.5-2.5 (3) MG/3ML SOLN nebulizer solution Inhale 3 mLs into the lungs every 4 hours J 44.1 1/22/18  Yes Louis Montoya MD   budesonide-formoterol (SYMBICORT) 160-4.5 MCG/ACT AERO Inhale 2 puffs into the lungs 2 times daily   Yes Historical Provider, MD   albuterol (PROVENTIL HFA) 108 (90 BASE) MCG/ACT inhaler Inhale 2 puffs into the lungs every 6 hours as needed for Wheezing or Shortness of Breath. 12/20/12  Yes Burak Ye MD       Allergies:  Codeine    Social History:    TOBACCO:   reports that she has been smoking cigarettes. She has a 25.00 pack-year smoking history. She has never used smokeless tobacco.  ETOH:   reports that she does not drink alcohol. Family History:  Reviewed in detail and negative for DM, Early CAD, Cancer (except as below). Positive as follows:        Problem Relation Age of Onset    Cancer Mother     Heart Disease Father     Stroke Father     Heart Disease Sister     Stroke Sister        REVIEW OF SYSTEMS:   Pertinent positives/negatives as follows: SOB/ZENDEJAS, productive cough (yellow), and as discussed in HPI, otherwise a complete ROS performed and all other systems are negative  PHYSICAL EXAM PERFORMED:    BP (!) 141/77   Pulse 86   Temp 97 °F (36.1 °C) (Oral)   Resp 18   Ht 5' 4\" (1.626 m)   Wt 103 lb 11.2 oz (47 kg)   SpO2 96%   BMI 17.80 kg/m²     GEN:  A&Ox3, mild increased WOB  HEENT:  NC/AT,EOMI, MMM, no erythema/exudates or visible masses. CVS:  Normal S1,S2. RRR. Without M/G/R.   LUNG:   bilat exp  wheezes, rhonchi in R lower fields w/o rhonchi  ABD:  Soft, ND/NT, BS+ x4. Without G/R.  EXT: 2+ pulses, no c/c/e. Brisk cap refill. PSY:  Thought process intact, affect appropriate. MAMADOU:  CN III-XII intact, moves all 4 spontaneously, sensory grossly intact. SKIN: No rash or lesions on visible skin.     Chart review shows recent radiographs:  Xr Chest Standard (2 Vw)    Result Date: 3/18/2019  EXAMINATION: TWO VIEWS OF THE CHEST 3/18/2019 3:23 pm COMPARISON: 02/18/2019 HISTORY: ORDERING SYSTEM PROVIDED HISTORY: SOB TECHNOLOGIST PROVIDED HISTORY: Reason for exam:->SOB Ordering Physician Provided Reason for Exam: SOB Acuity: Acute Type of Exam: Initial FINDINGS: Increased interstitial opacities identified throughout both lungs. More focal patchy airspace disease in the right lung noted. The heart mediastinal contours are unremarkable appearance. No pneumothorax is found. No free air. No acute bony abnormalities. Mild patchy airspace disease in the right mid to lower lung, concerning for multifocal pneumonia. There is a background of increased interstitial opacity is well. Much of that is probably chronic but correlate with any clinical evidence of an atypical pneumonia. Xr Chest (2 Vw)    Result Date: 2/18/2019  EXAMINATION: TWO VIEWS OF THE CHEST 2/18/2019 12:37 pm COMPARISON: 05/25/2018 HISTORY: ORDERING SYSTEM PROVIDED HISTORY: Chronic obstructive pulmonary disease, unspecified COPD type [facility]) TECHNOLOGIST PROVIDED HISTORY: Ordering Physician Provided Reason for Exam: patient has COPD; increased SOB and has had  alot of mucus. history of pneumonia 4 years ago. hx of smoking Acuity: Acute Type of Exam: Initial FINDINGS: Lungs hyperinflated. No infiltrate.   Heart size and pulmonary vessels within normal limits     COPD with no acute cardiopulmonary process demonstrated     EKG 12 Lead [[id number]] Collected: 03/18/19 1823   Updated: 03/18/19 2103     Ventricular Rate 109 BPM    Atrial Rate 109 BPM    P-R Interval 124 ms    QRS Duration 82 ms    Q-T Interval 338 ms    QTc Calculation (Bazett) 455 ms    P Axis 85 degrees    R Axis -49 degrees    T Axis 86 degrees    Diagnosis Sinus tachycardia with Fusion complexesBiatrial enlargementLeft axis deviationPulmonary disease patternRSR' or QR pattern in V1 suggests right ventricular conduction delaySeptal infarct , age undeterminedT wave abnormality, consider lateral ischemiaAbnormal ECGWhen compared with ECG of5.25.18 IRBBB is now present. Confirmed by Lizandro Olivarez MD, [address] ([id number]) on 3/18/2019 9:03:48 PM         CBC:  Recent Labs     03/18/19 1835   WBC 9.7   HGB 15.7   HCT 47.0         RENAL  Recent Labs     03/18/19 1835      K 4.6   CL 96*   CO2 33*   BUN 25*   CREATININE 1.0   GLUCOSE 144*     Hemoglobin a1c:  No results found for: LABA1C  LFT'S:  Recent Labs     03/18/19 1835   AST 14*   ALT 12   BILITOT 0.3   ALKPHOS 71     CARDIAC ENZYMES:   Recent Labs     03/18/19 1835   TROPONINI <0.01     Lab Results   Component Value Date    PROBNP 433 03/18/2019    PROBNP 1,310 (H) 12/29/2017     LACTIC ACID:  Recent Labs     03/18/19 2025   LACTA 1.3     PHYSICIAN CERTIFICATION    I certify that Alicia Arellano is expected to be hospitalized for 2 midnights based on the following assessment and plan:    ASSESSMENT/PLAN:  1. Sepsis, likely related to #2, IV ceftriaxone and azithro, f/u resp and bld cx. No need for pressors at this time. 2. CAP w/ probable aeCOD component, failed 2 separate courses of ABX (levaquin and one unknown other), RLL/RML, multifocal.  aeCOPD, IV solumedrol (cont home chronic prednisone), IV ABX as above, PRN/SAMANTA intensive NEB therapy. Check respiratory culture and procalcitonin. Pulm consult (sees Dr. Parikh [place] OP). Hold home regimen for now. NPO pending SLP eval.  3. Chronic respiratory failure with hypoxia, related to COPD, supplemental O2 to maintain SPO2 ? 92%, continuous pulse ox. Sats stable on home O2 of 2L. 4. HTN, poorly controlled, not on home regimen, PRN PO labetalol. 5. Tobacco Abuse, counseled cessation, 14 mg nicotine patch. 6. Dehydration, IVF. BUN:Cr 25:1.    DVT Prophylaxis: Lovenox  Diet:  NPO w/ sips and chips pending SLP eval.   Code Status: Full Code  PT/OT Eval Status:  Will order if needed and as patient condition allows  Dispo - Admit to inpatient 2w    Louis Epps MD    Thank you Mercedes Maciel MD for the opportunity to be involved in this patient's care. If you have any questions or concerns please feel free to contact me via the [employer] Answering Service at [phone]. This chart was generated using the [address] [place] [address] [address]  dictation system. I created this record but it may contain dictation errors given the limitations of this technology.

## 2019-03-19 NOTE — FLOWSHEET NOTE
03/18/19 2053   Vital Signs   Temp 97 °F (36.1 °C)   Temp Source Oral   Pulse 86   Heart Rate Source Monitor   Resp 18   BP (!) 141/77   BP Location Left upper arm   BP Upper/Lower Upper   Patient Position Semi fowlers   Level of Consciousness 0   MEWS Score 1   Height and Weight   Height 5' 4\" (1.626 m)   Weight 103 lb 11.2 oz (47 kg)   Weight Method Actual;Bed scale   BSA (Calculated - sq m) 1.46 sq meters   BMI (Calculated) 17.8   Oxygen Therapy   SpO2 96 %   O2 Device Nasal cannula   O2 Flow Rate (L/min) 2 L/min   Pt arrived from ER in stable condition. Report received from ER nurse.  Oksana Mg

## 2019-03-19 NOTE — PROGRESS NOTES
Awake & resting in bed watching TV. Pt reports breathing is \"about the same\" as when she came in & not back to her baseline. Shift assessment completed. Denies any needs.

## 2019-03-19 NOTE — PROGRESS NOTES
Pt seen by speech & cleared to eat. Dietary in & took pt's lunch order. Pt eating chicken broth & pudding to hold her over until lunch arrived. Pt denies any needs.

## 2019-03-19 NOTE — CONSULTS
Patient is being seen at the request of Suman Faye MD  for a consultation for CAP failed outpatient management    HISTORY OF PRESENT ILLNESS:   69years old with history of COPD and hypertension presented with shortness of breath for the past 2 weeks. White sputum. No hemoptysis. Dyspnea worse with exertion and better with resting. Completed 2 courses of antibiotics, including Levaquin and steroid taper. Has been using her 2 L O2 continuously. Chest x-ray showed right-sided patchy airspace disease. Smoker still few cigarette a week. PAST MEDICAL HISTORY:  Past Medical History:   Diagnosis Date    Asthma     COPD (chronic obstructive pulmonary disease) ([facility] [address]     Hyperlipidemia     Hypertension     OA (osteoarthritis) 2014     PAST SURGICAL HISTORY:  Past Surgical History:   Procedure Laterality Date    BREAST SURGERY      half of right breast removed     SECTION         FAMILY HISTORY:  family history includes Cancer in her mother; Heart Disease in her father and sister; Stroke in her father and sister. SOCIAL HISTORY:   reports that she has been smoking cigarettes. She has a 25.00 pack-year smoking history.  She has never used smokeless tobacco.    Scheduled Meds:   ipratropium-albuterol  1 ampule Inhalation Q4H    influenza virus vaccine  0.5 mL Intramuscular Once    pneumococcal 13-valent conjugate  0.5 mL Intramuscular Once    levothyroxine  25 mcg Oral Daily    simvastatin  20 mg Oral Nightly    predniSONE  10 mg Oral Daily    sodium chloride flush  10 mL Intravenous 2 times per day    enoxaparin  40 mg Subcutaneous Daily    azithromycin  500 mg Intravenous Q24H    And    cefTRIAXone (ROCEPHIN) IV  1 g Intravenous Q24H    nicotine  1 patch Transdermal Daily    methylPREDNISolone  40 mg Intravenous Q6H    Followed by   Colten Santos ON 3/21/2019] predniSONE  40 mg Oral Daily     Continuous Infusions:   sodium chloride 75 mL/hr at 19 6234     PRN Meds:  labetalol, sodium chloride flush, magnesium hydroxide, ondansetron, albuterol    ALLERGIES:  Patient is allergic to codeine. REVIEW OF SYSTEMS:  Constitutional: Negative for fever  HENT: Negative for sore throat  Eyes: Negative for redness   Respiratory: + dyspnea, cough  Cardiovascular: Negative for chest pain  Gastrointestinal: Negative for vomiting, diarrhea   Genitourinary: Negative for hematuria   Musculoskeletal: Negative for arthralgias   Skin: Negative for rash  Neurological: Negative for syncope  Hematological: Negative for adenopathy  Psychiatric/Behavorial: Negative for anxiety    PHYSICAL EXAM:  Blood pressure 127/74, pulse 75, temperature 97 °F (36.1 °C), temperature source Oral, resp. rate 18, height 5' 4\" (1.626 m), weight 103 lb 11.2 oz (47 kg), SpO2 96 %, not currently breastfeeding.' on 2LPM   Gen: No distress. Eyes: PERRL. No sclera icterus. No conjunctival injection. ENT: No discharge. Pharynx clear. Neck: Trachea midline. No obvious mass. Resp: + accessory muscle use. Minimal right-sided  crackles. ++ wheezes. No rhonchi. No dullness on percussion. CV: Regular rate. Regular rhythm. No murmur or rub. No edema. GI: Non-tender. Non-distended. No hernia. Skin: Warm and dry. No nodule on exposed extremities. Lymph: No cervical LAD. No supraclavicular LAD. M/S: No cyanosis. No joint deformity. No clubbing. Neuro: Awake. Alert. Moves all four extremities. Psych: Oriented x 3. No anxiety. LABS:  CBC:   Recent Labs     03/18/19  1835 03/19/19  0634   WBC 9.7 5.2   HGB 15.7 13.0   HCT 47.0 39.0   MCV 95.1 95.3    202     BMP:   Recent Labs     03/18/19 1835      K 4.6   CL 96*   CO2 33*   BUN 25*   CREATININE 1.0     LIVER PROFILE:   Recent Labs     03/18/19 1835   AST 14*   ALT 12   BILITOT 0.3   ALKPHOS 71     PT/INR: No results for input(s): PROTIME, INR in the last 72 hours. APTT: No results for input(s): APTT in the last 72 hours.   UA:No results for input(s): NITRITE, COLORU, PHUR, LABCAST, WBCUA, RBCUA, MUCUS, TRICHOMONAS, YEAST, BACTERIA, CLARITYU, SPECGRAV, LEUKOCYTESUR, UROBILINOGEN, BILIRUBINUR, BLOODU, GLUCOSEU, AMORPHOUS in the last 72 hours. Invalid input(s): KETONESU  No results for input(s): PHART, IHT1SHI, PO2ART in the last 72 hours. Chest imaging  3/18 was reviewed by me and showed right-sided patchy airspace disease    ASSESSMENT:  · Abnormal CXR -favor resolving pneumonia   · Very severe COPD with acute exacerbation  · Shortness of breath  · Chronic hypoxemic respiratory failure  · Ongoing tobacco abuse    PLAN:  Supplemental oxygen to maintain SaO2 >92%; wean as tolerated  Intensive inhaled bronchodilator therapy  Continue ceftriaxone and Zithromax for now  IV solumedrol 40 mg IV Q12 hrs.  Plan to switch to oral prednisone taper when improved  Sputum GS&C  Acapella QID and Mucinex BID  I recommend CXR in 4-6 week to document resolution of abnormalities   Smoking cessation counseling

## 2019-03-19 NOTE — CARE COORDINATION
Case Management Assessment  Initial Evaluation    Date/Time of Evaluation: 3/19/2019 10:21 AM  Assessment Completed by: Rosa Castrejon    Patient Name: Natividad Osgood  YOB: 1940  Diagnosis: Sepsis [facility]) [A41.9]  Date / Time: 3/18/2019  5:58 PM  Admission status/Date:inpatient 03/18/19  Chart Reviewed: Yes      Patient Interviewed: Yes   Family Interviewed:  No      Hospitalization in the last 30 days:  No    Contacts  :     Relationship to Patient:   Phone Number:    Alternate Contact:     Relationship to Patient:     Phone Number:    Met with:    Current PCP  Daniel Auguste MD      Financial  Medicare  Precert required for SNF : Zain Avila        3 night stay required - Y, N    ADLS  Support Systems: Family Members  Transportation: family    Meal Preparation: self    Housing  Home Environment: home alone  Steps: one  Plans to Return to Present Housing: Yes  Other Identified Issues: no    Home Care Information  Currently active with [address] : No     Passport/Waiver : No  :                      Phone Number:    Passport/Waiver Services: no          Durable Medical Equipment   DME Provider: [facility]  Equipment: Walker__Cane__RTS__ BSC__Shower Chair__  02_x_ HHN_x_ CPAP__  BiPap__  Hospital Bed__ W/C___ Other__________      Has Home O2 in place on admit:  Yes  Informed of need to bring portable home O2 tank on day of discharge for nursing to connect prior to leaving:   Yes  Verbalized agreement/Understanding:   Yes    Community Service Affiliation  Dialysis:  No    · Name:  · Location  · Dialysis Schedule:  · Phone:   · Fax: Outpatient PT/OT: No    Cancer Center: No     CHF Clinic: No     Pulmonary Rehab: No  Pain Clinic: No  Community Mental Health: No    Wound Clinic: No     Other: no    DISCHARGE PLAN: Reviewed Chart. Met with the pt. Role of dcp explained. Pt from home alone and plan return. Pt declines [facility] at this time. Follow.     Explained Case Management role/services.

## 2019-03-19 NOTE — PROGRESS NOTES
Progress Note    Admit Date:  3/18/2019  Admitted for CAP     Subjective:  Ms. Stephanie Aragon is feeling a little bit better today. No fevers. On home O2     Objective:   Patient Vitals for the past 4 hrs:   SpO2   03/19/19 1058 96 %            Intake/Output Summary (Last 24 hours) at 3/19/2019 1234  Last data filed at 3/19/2019 1157  Gross per 24 hour   Intake 1087 ml   Output --   Net 1087 ml       Physical Exam:  Gen: Thin, elderly female. No distress. Alert. Eyes: PERRL. No sclera icterus. No conjunctival injection. ENT: No discharge. Pharynx clear. Neck: No JVD. Trachea midline. Resp: No accessory muscle use. Inspiratory and expiratory wheezing throughout with decreased breath sounds in R lung fields, 2L NC O2   CV: Regular rate. Regular rhythm. No murmur. No rub. No edema. Capillary Refill: Brisk,< 3 seconds   Peripheral Pulses: +2 palpable, equal bilaterally   GI: Non-tender. Non-distended. Normal bowel sounds. Skin: Warm and dry. Scattered ecchymosis to bilateral upper extremities   M/S: No cyanosis. No joint deformity. No clubbing. Neuro: Awake. Grossly nonfocal    Psych: Oriented x 3. No anxiety or agitation.        Scheduled Meds:   ipratropium-albuterol  1 ampule Inhalation Q4H    guaiFENesin  600 mg Oral BID    [START ON 3/21/2019] methylPREDNISolone  40 mg Intravenous Q12H    Followed by   Irl Francie ON 3/22/2019] predniSONE  40 mg Oral Daily    pneumococcal 13-valent conjugate  0.5 mL Intramuscular Once    levothyroxine  25 mcg Oral Daily    simvastatin  20 mg Oral Nightly    sodium chloride flush  10 mL Intravenous 2 times per day    enoxaparin  40 mg Subcutaneous Daily    azithromycin  500 mg Intravenous Q24H    And    cefTRIAXone (ROCEPHIN) IV  1 g Intravenous Q24H    nicotine  1 patch Transdermal Daily       Continuous Infusions:   sodium chloride 75 mL/hr at 03/19/19 1157       PRN Meds:  labetalol, sodium chloride flush, magnesium hydroxide, ondansetron, albuterol      Data:  CBC:   Recent Labs     03/18/19 1835 03/19/19  0634   WBC 9.7 5.2   HGB 15.7 13.0   HCT 47.0 39.0   MCV 95.1 95.3    202     BMP:   Recent Labs     03/18/19 1835 03/19/19  0634    140   K 4.6 4.6   CL 96* 106   CO2 33* 25   BUN 25* 24*   CREATININE 1.0 0.8     LIVER PROFILE:   Recent Labs     03/18/19 1835 03/19/19  0634   AST 14* 24   ALT 12 23   BILITOT 0.3 0.3   ALKPHOS 71 54     Pro-      TSH 2.34      Procalcitonin 0.13     Lactic Acid 1.3       CULTURES  Blood Cx: pending   Resp Cx: ordered   Rapid Flu: negative      RADIOLOGY  XR CHEST STANDARD (2 VW)   Final Result   Mild patchy airspace disease in the right mid to lower lung, concerning for   multifocal pneumonia. There is a background of increased interstitial opacity is well. Much of   that is probably chronic but correlate with any clinical evidence of an   atypical pneumonia. Liisa Kawasaki have reviewed the chart on [address] and personally interviewed and examined patient, reviewed the data (labs and imaging) and after discussion with my PA formulated the plan. Agree with note with the following edits. HPI:     Patient admitted for acute study failure and COPD exacerbation. She had failed outpatient treatment. This morning she feels about the same. She is actively wheezing. She is on 2 L of oxygen at home and she is on 2 L in the hospital.    I reviewed the patient's Past Medical History, Past Surgical History, Medications, and Allergies. Physical exam:    BP (!) 140/73   Pulse 94   Temp 96.8 °F (36 °C) (Oral)   Resp 16   Ht 5' 4\" (1.626 m)   Wt 103 lb 11.2 oz (47 kg)   SpO2 94%   BMI 17.80 kg/m²     Gen: No distress. Alert. Eyes: PERRL. No sclera icterus. No conjunctival injection. ENT: No discharge. Pharynx clear. Neck: Trachea midline. Normal thyroid. Resp: mild accessory muscle use. No crackles. + wheezes. No rhonchi.  No dullness on percussion. CV: Regular rate. Regular rhythm. No murmur or rub. No edema. Assessment/Plan:  Sepsis--resolved  - likely related to CAP, IV ceftriaxone and azithro  - f/u resp and bld cx.    - No need for pressors at this time      CAP   - Concern for gram + organism   - failed 2 separate courses of ABX (levaquin and one unknown other)  - RLL/RML, multifocal.  -PRN supplemental O2   - IV Abx Ceftriaxone and Azithromycin   - Will need follow up CXR in 4-6 weeks     aeCOPD  - IV solumedrol (cont home chronic prednisone), IV ABX as above, - PRN/SAMANTA intensive NEB therapy. - Check respiratory culture and procalcitonin. - Pulm consult (sees Dr. Jessica Pope OP). -  NPO pending SLP eval    Chronic respiratory failure with hypoxia  - related to COPD, supplemental O2 to maintain SPO2 ? 92%, continuous pulse ox.     - Sats stable on home O2 of 2L      HTN  - poorly controlled, not on home regimen, PRN PO labetalol    Tobacco Abuse  - counseled cessation, 14 mg nicotine patch    Dehydration--improved  - IVF--tolerating PO intake well, d/c IVF    - BUN:Cr 25:1.    DVT Prophylaxis: Lovenox  Diet: DIET GENERAL;  Code Status: Full Code    Mary Brito 12:34 PM 3/19/2019      EDWARD CASE 3/19/2019 2:57 PM

## 2019-03-19 NOTE — PROGRESS NOTES
4 Eyes Skin Assessment     The patient is being assess for   Admission    I agree that 2 RN's have performed a thorough Head to Toe Skin Assessment on the patient. ALL assessment sites listed below have been assessed. Areas assessed by both nurses:   [x]   Head, Face, and Ears   [x]   Shoulders, Back, and Chest, Abdomen  [x]   Arms, Elbows, and Hands   []   Coccyx, Sacrum, and Ischium  [x]   Legs, Feet, and Heels        Refused to have staff check pt's sacrum, denies open areas. Bilat arms/legs with scattered bruises. No open areas noted. **SHARE this note so that the co-signing nurse is able to place an eSignature**    Co-signer eSignature: Electronically signed by Allegra Suh RN on 3/18/19 at 11:55 PM    Does the Patient have Skin Breakdown?   No          Trey Prevention initiated:  No   Wound Care Orders initiated:  No      [facility] nurse consulted for Pressure Injury (Stage 3,4, Unstageable, DTI, NWPT, Complex wounds)and New or Established Ostomies:  No      Primary Nurse eSignature: Electronically signed by Nivia Horowitz RN on 3/18/19 at 11:34 PM

## 2019-03-19 NOTE — PROGRESS NOTES
Admission questions completed. Pt ambulated to bathroom with standby assist. Pt SOB with exertion. 02 tubing added to pt's 02 so she can ambulate with 02 on. Pt aware that writer has no admission orders at this time and writer will review with her all new orders when available.   Opal Zhang

## 2019-03-19 NOTE — PROGRESS NOTES
Dyspnea with exertion;Irregular pattern;or RR less than 6 = 2    Breath Sounds: Diminshed bilaterally and/or crackles = 2    Sputum   ,  , Sputum How Obtained: Spontaneous cough  Cough: Strong, spontaneous, non-productive = 0    Vital Signs   BP (!) 141/77   Pulse 86   Temp 97 °F (36.1 °C) (Oral)   Resp 18   Ht 5' 4\" (1.626 m)   Wt 103 lb 11.2 oz (47 kg)   SpO2 96%   BMI 17.80 kg/m²   SPO2 (COPD values may differ): 90-91% on room air or greater than 92% on FiO2 24- 28% = 1    Peak Flow (asthma only): not applicable = 0    RSI: 7-8 = BID and Q4HPRN (every four hours as needed) for dyspnea        Plan       Goals: medication delivery    Patient/caregiver was educated on the proper method of use for Respiratory Care Devices:  Yes      Level of patient/caregiver understanding able to:   ? Verbalize understanding   ? Demonstrate understanding       ? Teach back        ? Needs reinforcement       ? No available caregiver               ? Other:     Response to education:  Good     Is patient being placed on Home Treatment Regimen? Yes     Does the patient have everything they need prior to discharge? NA     Comments: Chart reviewed and patient assessed. Plan of Care: Duoneb Q4H,  Albuterol Q2PRN. Electronically signed by Kt Barbosa RCP on 3/19/2019 at 12:28 AM    Respiratory Protocol Guidelines     1. Assessment and treatment by Respiratory Therapy will be initiated for medication and therapeutic interventions upon initiation of aerosolized medication. 2. Physician will be contacted for respiratory rate (RR) greater than 35 breaths per minute. Therapy will be held for heart rate (HR) greater than 140 beats per minute, pending direction from physician. 3. Bronchodilators will be administered via Metered Dose Inhaler (MDI) with spacer when the following criteria are met:  a.  Alert and cooperative     b. HR < 140 bpm  c. RR < 30 bpm                d. Can demonstrate a 2-3 second inspiratory hold  4. Bronchodilators will be administered via Hand Held Nebulizer [facility]) to patients when ANY of the following criteria are met  a. Incognizant or uncooperative          b. Patients treated with HHN at Home        c. Unable to demonstrate proper use of MDI with spacer     d. RR > 30 bpm   5. Bronchodilators will be delivered via Metered Dose Inhaler (MDI), HHN, Aerogen to intubated patients on mechanical ventilation. 6. Inhalation medication orders will be delivered and/or substituted as outlined below. Aerosolized Medications Ordering and Administration Guidelines:    1. All Medications will be ordered by a physician, and their frequency and/or modality will be adjusted as defined by the patients Respiratory Severity Index (RSI) score. 2. If the patient does not have documented COPD, consider discontinuing anticholinergics when RSI is less than 9.  3. If the bronchospasm worsens (increased RSI), then the bronchodilator frequency can be increased to a maximum of every 4 hours. If greater than every 4 hours is required, the physician will be contacted. 4. If the bronchospasm improves, the frequency of the bronchodilator can be decreased, based on the patient's RSI, but not less than home treatment regimen frequency. 5. Bronchodilator(s) will be discontinued if patient has a RSI less than 9 and has received no scheduled or as needed treatment for 72  Hrs. Patients Ordered on a Mucolytic Agent:    1. Must always be administered with a bronchodilator. 2. Discontinue if patient experiences worsened bronchospasm, or secretions have lessened to the point that the patient is able to clear them with a cough. Anti-inflammatory and Combination Medications:    1. If the patient lacks prior history of lung disease, is not using inhaled anti-inflammatory medication at home, and lacks wheezing by examination or by history for at least 24 hours, contact physician for possible discontinuation.

## 2019-03-19 NOTE — PROGRESS NOTES
Nutrition Assessment    Type and Reason for Visit: Initial, Positive Nutrition Screen(poor intake)    Nutrition Recommendations:   1. Continue general diet order and add Ensure Enlive TID with meals  2. Monitor intakes and weights     Nutrition Assessment: Patient is moderately malnourished AEB moderate muscle and fat loss and poor intake PTA. Patient is at risk for further compromise d/t ongoing poor appetite/intake, altered tastes and early satiety. Will continue general diet order and add Ensure TID with meals. Malnutrition Assessment:  · Malnutrition Status: Meets the criteria for moderate malnutrition  · Context: Acute illness or injury  · Findings of the 6 clinical characteristics of malnutrition (Minimum of 2 out of 6 clinical characteristics is required to make the diagnosis of moderate or severe Protein Calorie Malnutrition based on AND/ASPEN Guidelines):  1. Energy Intake-Less than or equal to 75% of estimated energy requirement, Greater than or equal to 7 days    2. Weight Loss-Unable to assess, unable to assess  3. Fat Loss-Moderate subcutaneous fat loss, Orbital, Triceps  4. Muscle Loss-Moderate muscle mass loss, Scapula (trapezius), Calf (gastrocnemius), Clavicles (pectoralis and deltoids), Temples (temporalis muscle)  5. Fluid Accumulation-No significant fluid accumulation    6.  Strength-Not measured    Nutrition Risk Level: High    Nutrient Needs:  · Estimated Daily Total Kcal: 8358-8171(30-35kcal/kg CBW)  · Estimated Daily Protein (g): 65-76(1.2-1.4g/kg IBW)  · Estimated Daily Total Fluid (ml/day): 1500    Nutrition Diagnosis:   · Problem:  Moderate malnutrition  · Etiology: related to Altered taste perception, Early satiety, Impaired respiratory function-inability to consume food, Insufficient energy/nutrient consumption     Signs and symptoms:  as evidenced by Patient report of, Diet history of poor intake, Moderate muscle loss, Moderate loss of subcutaneous fat, BMI    Objective Information:  · Nutrition-Focused Physical Findings: Patient lying supine in bed A&O. She appears to be thin and petite. Patient's skin is thin and dry. Hair is shelved. No wounds or edema, however she does have physical s/s of moderate muscle and fat loss. · Wound Type: None  · Current Nutrition Therapies:  · Oral Diet Orders: General   · Oral Diet intake: Unable to assess(no intake yet)  · Anthropometric Measures:  · Ht: 5' 4\" (162.6 cm)   · Current Body Wt: 103 lb 11.2 oz (47 kg)  · Admission Body Wt: 103 lb 11.2 oz (47 kg)  · Usual Body Wt: (100-130lb per patient she flucuates)  · % Weight Change:   unable to determine  · Ideal Body Wt: 120 lb (54.4 kg), % Ideal Body 86%  · BMI Classification: BMI <18.5 Underweight    Nutrition Interventions:   Continue current diet, Start ONS  Continued Inpatient Monitoring, Coordination of Care, Coordination of Community Care    Nutrition Evaluation:   · Evaluation: Goals set   · Goals: Patient will consume >50% of three meals per day and >50% of ONS TID.     · Monitoring: Meal Intake, Supplement Intake, Weight      Electronically signed by Dariana Gómez on 3/19/19 at 12:57 PM    Contact Number: [phone]

## 2019-03-19 NOTE — PROGRESS NOTES
Speech Language Pathology  Facility/Department: [address] MEDICAL-SURGICAL   BEDSIDE SWALLOW EVALUATION    Eval Only. Recommend Regular diet and thin liquids. Patient does report history of reflux. Oral and pharyngeal swallow are grossly within functional limits. NAME: Ab Ross  : [id number]  MRN: [id number]    ADMISSION DATE: 3/18/2019  ADMITTING DIAGNOSIS: has Hyperlipidemia; Asthma; OA (osteoarthritis); Tobacco use; COPD exacerbation ([address] and Sepsis ([address] on their problem list.  ONSET DATE: 3/18/19    Recent Chest Xray/CT of Chest: ( Chest x-ray 3/18/19)  Impression   Mild patchy airspace disease in the right mid to lower lung, concerning for   multifocal pneumonia.       There is a background of increased interstitial opacity is well.  Much of   that is probably chronic but correlate with any clinical evidence of an   atypical pneumonia. Date of Eval: 3/19/2019  Evaluating Therapist: Merle Mcneil    Current Diet level:  Current Diet : NPO(until SLP eval)  Current Liquid Diet : NPO      Primary Complaint  Patient Complaint: hungry    Pain:  Pain Assessment  Patient Currently in Pain: Denies  Pain Level: 0  Pain Type: Acute pain  Pain Location: Abdomen, Chest    Reason for Referral  Ab Ross was referred for a bedside swallow evaluation to assess the efficiency of her swallow function, identify signs and symptoms of aspiration and make recommendations regarding safe dietary consistencies, effective compensatory strategies, and safe eating environment. Impression  Dysphagia Diagnosis: Swallow function appears grossly intact(WFL )  Dysphagia Outcome Severity Scale: Level 7: Normal in all situations     Patient denies coughing/choking. Patient tolerated thin, pudding, and regular solids without clinical s/s of aspiration, swallow initiating, hyolaryngeal elevation and swallowing timing grossly within functional limits.  Patient edentulous at top ad requires increased mastication time however adequate, patient independently chooses softer foods. Patient reports history of reflux, currently not taking medication. No further acute speech therapy warranted at this time. Demonstrated good understanding of aspiration precautions. Treatment Plan  Requires SLP Intervention: No  Duration/Frequency of Treatment: Eval Only  D/C Recommendations: Home independently       Recommended Diet and Intervention  Diet Solids Recommendation: Regular  Liquid Consistency Recommendation: Thin  Recommended Form of Meds: PO       Compensatory Swallowing Strategies  Compensatory Swallowing Strategies: Eat/Feed slowly;Upright as possible for all oral intake;Remain upright for 30-45 minutes after meals;Small bites/sips    Treatment/Goals   Patient will demonstrated understanding safe swallowing strategies and risk of aspiration with 80% accuracy GOAL MET 3/19    General  Chart Reviewed: Yes  Comments: Patient with past medical history of COPD. Presented with increased SOB and productive cough. chest x-ray concerning for PNA. Subjective  Subjective: RN ok'ed SLP entery. patient pleasant and cooperative. Behavior/Cognition: Alert; Cooperative;Pleasant mood  Temperature Spikes Noted: No  Respiratory Status: O2 via nasual cannula  Breath Sounds: Clear  O2 Device: Nasal cannula  Liters of Oxygen: 2 L  Communication Observation: Functional  Follows Directions: Complex  Dentition: Some missing teeth(upper edentulous, )  Patient Positioning: Upright in bed  Baseline Vocal Quality: Normal  Volitional Cough: Strong  Prior Dysphagia History: No history of dysphagia  Consistencies Administered: Reg solid;Puree; Thin - cup;Nectar - straw      Pain Level: 0    Vision/Hearing  Vision  Vision: Within Functional Limits  Hearing  Hearing: Within functional limits    Oral Motor Deficits  Oral/Motor  Oral Motor:  Within functional limits    Oral Phase Dysfunction  Oral Phase  Oral Phase: WFL(prolonged oral phase secondary to edentulous. Patient is reported upper dentures are ill fitting so she has to choose softer foods items. )     Indicators of Pharyngeal Phase Dysfunction   Pharyngeal Phase  Pharyngeal Phase: WFL    Prognosis  Prognosis  Prognosis for safe diet advancement: excellent  Individuals consulted  Consulted and agree with results and recommendations: Patient;RN    Education  Role of SLP, risks associated with aspiration, s/s of aspiration, safe swallowing strategies, basic anatomy and physiology of the swallow, diet recommendations. and discharge from speech therapy.     Patient Education Response: Verbalizes understanding;Demonstrated understanding        Therapy Time  SLP Individual Minutes  Time In: 3868  Time Out: 5142  Minutes: 27   Swallow evaluation  Dysphagia treatment            Sydnie Needle [address] [place] [address] Drive .[id number]  Speech Language Pathologist

## 2019-03-20 PROCEDURE — 6370000000 HC RX 637 (ALT 250 FOR IP): Performed by: INTERNAL MEDICINE

## 2019-03-20 PROCEDURE — 94640 AIRWAY INHALATION TREATMENT: CPT

## 2019-03-20 PROCEDURE — 99232 SBSQ HOSP IP/OBS MODERATE 35: CPT | Performed by: INTERNAL MEDICINE

## 2019-03-20 PROCEDURE — 2580000003 HC RX 258: Performed by: INTERNAL MEDICINE

## 2019-03-20 PROCEDURE — 6360000002 HC RX W HCPCS: Performed by: INTERNAL MEDICINE

## 2019-03-20 PROCEDURE — 94668 MNPJ CHEST WALL SBSQ: CPT

## 2019-03-20 PROCEDURE — 2700000000 HC OXYGEN THERAPY PER DAY

## 2019-03-20 PROCEDURE — 99233 SBSQ HOSP IP/OBS HIGH 50: CPT | Performed by: INTERNAL MEDICINE

## 2019-03-20 PROCEDURE — 1200000000 HC SEMI PRIVATE

## 2019-03-20 PROCEDURE — 94762 N-INVAS EAR/PLS OXIMTRY CONT: CPT

## 2019-03-20 RX ORDER — PREDNISONE 20 MG/1
40 TABLET ORAL DAILY
Status: DISCONTINUED | OUTPATIENT
Start: 2019-03-20 | End: 2019-03-21 | Stop reason: HOSPADM

## 2019-03-20 RX ORDER — HYDROCODONE BITARTRATE AND ACETAMINOPHEN 5; 325 MG/1; MG/1
1 TABLET ORAL EVERY 6 HOURS PRN
Status: DISCONTINUED | OUTPATIENT
Start: 2019-03-20 | End: 2019-03-21 | Stop reason: HOSPADM

## 2019-03-20 RX ADMIN — PREDNISONE 40 MG: 20 TABLET ORAL at 15:01

## 2019-03-20 RX ADMIN — GUAIFENESIN 600 MG: 600 TABLET, EXTENDED RELEASE ORAL at 09:01

## 2019-03-20 RX ADMIN — Medication 10 ML: at 17:41

## 2019-03-20 RX ADMIN — AZITHROMYCIN MONOHYDRATE 500 MG: 500 INJECTION, POWDER, LYOPHILIZED, FOR SOLUTION INTRAVENOUS at 21:09

## 2019-03-20 RX ADMIN — IPRATROPIUM BROMIDE AND ALBUTEROL SULFATE 1 AMPULE: .5; 3 SOLUTION RESPIRATORY (INHALATION) at 06:28

## 2019-03-20 RX ADMIN — IPRATROPIUM BROMIDE AND ALBUTEROL SULFATE 1 AMPULE: .5; 3 SOLUTION RESPIRATORY (INHALATION) at 19:14

## 2019-03-20 RX ADMIN — IPRATROPIUM BROMIDE AND ALBUTEROL SULFATE 1 AMPULE: .5; 3 SOLUTION RESPIRATORY (INHALATION) at 10:31

## 2019-03-20 RX ADMIN — Medication 10 ML: at 21:09

## 2019-03-20 RX ADMIN — ENOXAPARIN SODIUM 40 MG: 40 INJECTION SUBCUTANEOUS at 09:01

## 2019-03-20 RX ADMIN — Medication 10 ML: at 09:01

## 2019-03-20 RX ADMIN — SIMVASTATIN 20 MG: 10 TABLET, FILM COATED ORAL at 21:09

## 2019-03-20 RX ADMIN — GUAIFENESIN 600 MG: 600 TABLET, EXTENDED RELEASE ORAL at 21:09

## 2019-03-20 RX ADMIN — IPRATROPIUM BROMIDE AND ALBUTEROL SULFATE 1 AMPULE: .5; 3 SOLUTION RESPIRATORY (INHALATION) at 02:45

## 2019-03-20 RX ADMIN — CEFTRIAXONE SODIUM 1 G: 1 INJECTION, POWDER, FOR SOLUTION INTRAMUSCULAR; INTRAVENOUS at 18:08

## 2019-03-20 RX ADMIN — LEVOTHYROXINE SODIUM 25 MCG: 25 TABLET ORAL at 05:17

## 2019-03-20 RX ADMIN — ONDANSETRON 4 MG: 2 INJECTION INTRAMUSCULAR; INTRAVENOUS at 17:41

## 2019-03-20 RX ADMIN — IPRATROPIUM BROMIDE AND ALBUTEROL SULFATE 1 AMPULE: .5; 3 SOLUTION RESPIRATORY (INHALATION) at 15:16

## 2019-03-20 RX ADMIN — IPRATROPIUM BROMIDE AND ALBUTEROL SULFATE 1 AMPULE: .5; 3 SOLUTION RESPIRATORY (INHALATION) at 23:28

## 2019-03-20 NOTE — PROGRESS NOTES
AM assessment complete. Gave am meds due see mar. Denies any needs/pain. Ice water given. A/O x 4. Call light within reach.

## 2019-03-20 NOTE — CARE COORDINATION
INTERDISCIPLINARY PLAN OF CARE CONFERENCE    Date/Time: 3/20/2019 4:37 PM  Completed by: Sadi Cooley Case Management      Patient Name:  Sydni Kuhn  YOB: 1940  Admitting Diagnosis: Sepsis [facility]) [A41.9]     Admit Date/Time:  3/18/2019  5:58 PM    Chart reviewed. Interdisciplinary team met to discuss patient progress and discharge plans. Disciplines included Case Management, Nursing, and Dietitian. Current Status: monitor vs and labs     PT/OT recommendation: none to date     Anticipated Discharge Date: TBD  Expected D/C Disposition:  Home  Confirmed plan with patient and/or family Yes: patient   Discharge Plan Comments: Chart reviewed. Spoke with he patient at the bedside. Plans return home-she does not anticipate any needs at this time. CM will follow. Home O2 in place on admit: Yes  Pt informed of need to bring portable home O2 tank on day of discharge for nursing to connect prior to leaving:  Yes  Verbalized agreement/Understanding:   Yes

## 2019-03-20 NOTE — PLAN OF CARE
Problem: Falls - Risk of:  Goal: Will remain free from falls  Description  Will remain free from falls  Outcome: Ongoing     Problem: Airway Clearance - Ineffective:  Goal: Clear lung sounds  Description  Clear lung sounds  Outcome: Ongoing

## 2019-03-20 NOTE — PROGRESS NOTES
HGB 15.7 13.0   HCT 47.0 39.0   MCV 95.1 95.3    202     BMP:   Recent Labs     03/18/19  1835 03/19/19  0634    140   K 4.6 4.6   CL 96* 106   CO2 33* 25   BUN 25* 24*   CREATININE 1.0 0.8     LIVER PROFILE:   Recent Labs     03/18/19  1835 03/19/19  0634   AST 14* 24   ALT 12 23   BILITOT 0.3 0.3   ALKPHOS 71 54     Pro-      TSH 2.34      Procalcitonin 0.13     Lactic Acid 1.3       CULTURES  Blood Cx: pending   Resp Cx: ordered   Rapid Flu: negative      RADIOLOGY  XR CHEST STANDARD (2 VW)   Final Result   Mild patchy airspace disease in the right mid to lower lung, concerning for   multifocal pneumonia. There is a background of increased interstitial opacity is well. Much of   that is probably chronic but correlate with any clinical evidence of an   atypical pneumonia. [facility] have reviewed the chart on [facility] and personally interviewed and examined patient, reviewed the data (labs and imaging) and after discussion with my PA formulated the plan. Agree with note with the following edits. HPI:     Patient admitted for acute study failure and COPD exacerbation. She had failed outpatient treatment. This morning she feels about the same. She is actively wheezing. She is on 2 L of oxygen at home and she is on 2 L in the hospital.    3/20- feeling better. Less short of breath. I reviewed the patient's Past Medical History, Past Surgical History, Medications, and Allergies. Physical exam:    BP (!) 147/85   Pulse 99   Temp 97.3 °F (36.3 °C) (Oral)   Resp 26   Ht 5' 4\" (1.626 m)   Wt 94 lb 3.2 oz (42.7 kg)   SpO2 95%   BMI 16.17 kg/m²     Gen: No distress. Alert. Eyes: PERRL. No sclera icterus. No conjunctival injection. ENT: No discharge. Pharynx clear. Neck: Trachea midline. Normal thyroid. Resp: no accessory muscle use. No crackles. minimal wheezes. No rhonchi. No dullness on percussion. CV: Regular rate.  Regular rhythm. No murmur or rub. No edema. Assessment/Plan:  Sepsis--resolved  - likely related to CAP, IV ceftriaxone and azithro  - f/u resp and bld cx.    - No need for pressors at this time      CAP   - Concern for gram + organism   - failed 2 separate courses of ABX (levaquin and one unknown other)  - RLL/RML, multifocal.  -PRN supplemental O2   - IV Abx Ceftriaxone and Azithromycin   - Will need follow up CXR in 4-6 weeks     aeCOPD  - IV solumedrol (cont home chronic prednisone), IV ABX as above, - PRN/SAMANTA intensive NEB therapy. - Check respiratory culture--pending and procalcitonin wnl.    - Pulm consult (sees Dr. Hay Edge OP). Chronic respiratory failure with hypoxia  - related to COPD, supplemental O2 to maintain SPO2 ? 92%, continuous pulse ox.     - Sats stable on home O2 of 2L      HTN  - poorly controlled, not on home regimen, PRN PO labetalol    Tobacco Abuse  - counseled cessation, 14 mg nicotine patch    Dehydration--improved  - IVF--tolerating PO intake well, d/c IVF    - BUN:Cr 25:1.    DVT Prophylaxis: Lovenox  Diet: DIET GENERAL;  Dietary Nutrition Supplements: Standard High Calorie Oral Supplement  Code Status: Full Code    Armida Magdalena Kim 11:59 AM 3/20/2019      EDWARD CASE 3/20/2019 1:29 PM

## 2019-03-20 NOTE — PROGRESS NOTES
Pt had an episode of chest pain that was resolved between the nurse calling and the 5 minutes it took for me to come to the room. Pt is alert and oriented. Pt is tearful at times worrying about things that need done at home and not being well enough to be at home. Pt still denies pain and states she will let us know if she has any more pain. Pt sitting up in bed eating breakfast and talking on her phone.   Kole Del Rosario RN

## 2019-03-20 NOTE — PROGRESS NOTES
Pulmonary Progress Note    CC: Pneumonia    Subjective:   Overall doing better  2.5 L O2          Intake/Output Summary (Last 24 hours) at 3/20/2019 0714  Last data filed at 3/19/2019 1750  Gross per 24 hour   Intake 2210 ml   Output --   Net 2210 ml       Exam:   /74   Pulse 97   Temp 97 °F (36.1 °C) (Oral)   Resp 18   Ht 5' 4\" (1.626 m)   Wt 94 lb 3.2 oz (42.7 kg)   SpO2 98%   BMI 16.17 kg/m²  on 2.5 L O2  Gen: No distress. Eyes: PERRL. No sclera icterus. No conjunctival injection. ENT: No discharge. Pharynx clear. Neck: Trachea midline. No obvious mass. Resp: + accessory muscle use. Minimal right-sided  crackles. Bilateral  wheezes. No rhonchi. No dullness on percussion. CV: Regular rate. Regular rhythm. No murmur or rub. No edema. GI: Non-tender. Non-distended. No hernia. Skin: Warm and dry. No nodule on exposed extremities. Lymph: No cervical LAD. No supraclavicular LAD. M/S: No cyanosis. No joint deformity. No clubbing. Neuro: Awake. Alert. Moves all four extremities. Psych: Oriented x 3. No anxiety.          Scheduled Meds:   ipratropium-albuterol  1 ampule Inhalation Q4H    guaiFENesin  600 mg Oral BID    [START ON 3/21/2019] methylPREDNISolone  40 mg Intravenous Q12H    Followed by   Nisha Araujo ON 3/22/2019] predniSONE  40 mg Oral Daily    levothyroxine  25 mcg Oral Daily    simvastatin  20 mg Oral Nightly    sodium chloride flush  10 mL Intravenous 2 times per day    enoxaparin  40 mg Subcutaneous Daily    azithromycin  500 mg Intravenous Q24H    And    cefTRIAXone (ROCEPHIN) IV  1 g Intravenous Q24H    nicotine  1 patch Transdermal Daily     Continuous Infusions:    PRN Meds:  labetalol, sodium chloride flush, magnesium hydroxide, ondansetron, albuterol    Labs:  CBC:   Recent Labs     03/18/19 1835 03/19/19  0634   WBC 9.7 5.2   HGB 15.7 13.0   HCT 47.0 39.0   MCV 95.1 95.3    202     BMP:   Recent Labs     03/18/19 1835 03/19/19  0634    140   K

## 2019-03-20 NOTE — PROGRESS NOTES
Pt c/o N/V. Unmeasured emesis. Gave prn zofran iv see mar. A/O x4. Gave warm wash clothes. Call light within reach.

## 2019-03-20 NOTE — PROGRESS NOTES
Bedside report given and pt care transferred to [facility]. Pt denies needs at this time. Call light within reach.

## 2019-03-21 VITALS
SYSTOLIC BLOOD PRESSURE: 141 MMHG | BODY MASS INDEX: 18.92 KG/M2 | RESPIRATION RATE: 18 BRPM | OXYGEN SATURATION: 99 % | TEMPERATURE: 97 F | HEIGHT: 64 IN | HEART RATE: 101 BPM | WEIGHT: 110.8 LBS | DIASTOLIC BLOOD PRESSURE: 78 MMHG

## 2019-03-21 PROCEDURE — 6370000000 HC RX 637 (ALT 250 FOR IP): Performed by: INTERNAL MEDICINE

## 2019-03-21 PROCEDURE — 2700000000 HC OXYGEN THERAPY PER DAY

## 2019-03-21 PROCEDURE — 2580000003 HC RX 258: Performed by: INTERNAL MEDICINE

## 2019-03-21 PROCEDURE — 99232 SBSQ HOSP IP/OBS MODERATE 35: CPT | Performed by: INTERNAL MEDICINE

## 2019-03-21 PROCEDURE — 99238 HOSP IP/OBS DSCHRG MGMT 30/<: CPT | Performed by: INTERNAL MEDICINE

## 2019-03-21 PROCEDURE — 6360000002 HC RX W HCPCS: Performed by: INTERNAL MEDICINE

## 2019-03-21 PROCEDURE — 94668 MNPJ CHEST WALL SBSQ: CPT

## 2019-03-21 PROCEDURE — 94640 AIRWAY INHALATION TREATMENT: CPT

## 2019-03-21 PROCEDURE — 94761 N-INVAS EAR/PLS OXIMETRY MLT: CPT

## 2019-03-21 RX ORDER — PREDNISONE 10 MG/1
TABLET ORAL
Qty: 30 TABLET | Refills: 0 | Status: SHIPPED | OUTPATIENT
Start: 2019-03-21 | End: 2019-09-03 | Stop reason: ALTCHOICE

## 2019-03-21 RX ORDER — CEFDINIR 300 MG/1
300 CAPSULE ORAL 2 TIMES DAILY
Qty: 10 CAPSULE | Refills: 0 | Status: SHIPPED | OUTPATIENT
Start: 2019-03-21 | End: 2019-03-26

## 2019-03-21 RX ADMIN — IPRATROPIUM BROMIDE AND ALBUTEROL SULFATE 1 AMPULE: .5; 3 SOLUTION RESPIRATORY (INHALATION) at 11:09

## 2019-03-21 RX ADMIN — IPRATROPIUM BROMIDE AND ALBUTEROL SULFATE 1 AMPULE: .5; 3 SOLUTION RESPIRATORY (INHALATION) at 03:27

## 2019-03-21 RX ADMIN — GUAIFENESIN 600 MG: 600 TABLET, EXTENDED RELEASE ORAL at 08:44

## 2019-03-21 RX ADMIN — IPRATROPIUM BROMIDE AND ALBUTEROL SULFATE 1 AMPULE: .5; 3 SOLUTION RESPIRATORY (INHALATION) at 07:50

## 2019-03-21 RX ADMIN — Medication 10 ML: at 08:44

## 2019-03-21 RX ADMIN — ENOXAPARIN SODIUM 40 MG: 40 INJECTION SUBCUTANEOUS at 08:44

## 2019-03-21 RX ADMIN — LEVOTHYROXINE SODIUM 25 MCG: 25 TABLET ORAL at 05:29

## 2019-03-21 ASSESSMENT — PAIN SCALES - GENERAL: PAINLEVEL_OUTOF10: 0

## 2019-03-21 NOTE — CARE COORDINATION
DISCHARGE ORDER  Date/Time 3/21/2019 1:33 PM  Completed by: Ron Diaz, Case Management    Patient Name: Leann Valdez    : 1940  Admitting Diagnosis: Sepsis ([address] [A41.9]  Admit Date/Time: 3/18/2019  5:58 PM    Noted discharge order. Confirmed discharge plan with patient / family (pt): Yes   Discharge Plan: Chart reviewed and role of dcp explained. Pt is returning home alone and states she has family that are supportive and will help her if needed. Pt active with [facility] and has no portable tank, pt supplied with [facility] portable tank. Granddtr at bedside to transport home. Pt declines HHC at this time. No further dc eneds voiced or identified. Discharge timeout done with Bethanie Brewster RN. All discharge needs and concerns addressed.

## 2019-03-21 NOTE — PROGRESS NOTES
AM assessment complete. Gave am meds due see mar. Denies any pain/needs. A/O x 4 Call light within reach.

## 2019-03-21 NOTE — DISCHARGE SUMMARY
Name:  Marta Mclean  Room:  0215/0215-02  MRN:    [id number]    Discharge Summary      This discharge summary is in conjunction with a complete physical exam done on the day of discharge. Discharging Physician: Dr. Leonel Rojas: 3/18/2019  Discharge:   3/21/2019    HPI taken from admission H&P:    The patient is a 69 y.o. female with PMH below, presents with SOB/ZENDEJAS, productive cough (yellow). She reports the above sx over the last 2 weeks. She normally wears 2L O2 at home on a PRN basis. She has been having to wear it constantly the last couple of days. She reports that her sx are exacerbated by even a few steps at this time. She has seen her PCP for this and has been placed on 2 separate courses of ABX (one was Levaquin and the other she does not know the name of). She says she completed both but she has not improved. Unfortunately she continues to smoke. Diagnoses this Admission and Hospital Course   Sepsis-- (Tachycardia, tachypnea, source)--resolved  - likely related to CAP, IV ceftriaxone and azithro  - f/u resp and bld cx--NGTD    - No need for pressors       CAP   - Concern for gram + organism   - failed 2 separate courses of ABX (levaquin and one unknown other)  - RLL/RML, multifocal.  -PRN supplemental O2   - IV Abx Ceftriaxone and Azithromycin-->D/c home on cefdinir    - Will need follow up CXR in 4-6 weeks      aeCOPD  - IV solumedrol (cont home chronic prednisone), IV ABX as above, - PRN/SAMANTA intensive NEB therapy--> D/C home on prednisone taper    - procalcitonin wnl.    - Pulm consult      Chronic respiratory failure with hypoxia  - related to COPD, supplemental O2 to maintain SPO2 ? 92%, continuous pulse ox.    - Sats stable on home O2 of 2L      HTN  - poorly controlled, not on home regimen, PRN PO labetalol  -  Ok at time of discharge. OP follow up/monitor.  May need medication     Tobacco Abuse  - counseled cessation, 14 mg nicotine patch     Dehydration--improved  - IVF--tolerating PO intake well, d/c IVF    - BUN:Cr 25:1. Moderate PCM  Body mass index is 19.02 kg/m². - continue to encourage PO intake and monitor     Procedures (Please Review Full Report for Details)  None    Consults    Pulmonology       Physical Exam at Discharge:    BP (!) 141/78   Pulse 101   Temp 97 °F (36.1 °C) (Oral)   Resp 18   Ht 5' 4\" (1.626 m)   Wt 110 lb 12.8 oz (50.3 kg)   SpO2 99%   BMI 19.02 kg/m²     Gen: No distress. Alert. Eyes: PERRL. No sclera icterus. No conjunctival injection. ENT: No discharge. Pharynx clear. Neck: Trachea midline. Normal thyroid. Resp: no accessory muscle use. No crackles. minimal wheezes. No rhonchi. No dullness on percussion. CV: Regular rate. Regular rhythm. No murmur or rub. No edema. CBC:   Recent Labs     03/18/19  1835 03/19/19  0634   WBC 9.7 5.2   HGB 15.7 13.0   HCT 47.0 39.0   MCV 95.1 95.3    202     BMP:   Recent Labs     03/18/19  1835 03/19/19  0634    140   K 4.6 4.6   CL 96* 106   CO2 33* 25   BUN 25* 24*   CREATININE 1.0 0.8     LIVER PROFILE:   Recent Labs     03/18/19  1835 03/19/19  0634   AST 14* 24   ALT 12 23   BILITOT 0.3 0.3   ALKPHOS 71 54     Pro-      TSH 2.34      Procalcitonin 0.13      Lactic Acid 1.3     CULTURES  Blood Cx: NGTD  Rapid Flu: Negative     RADIOLOGY  XR CHEST STANDARD (2 VW)   Final Result   Mild patchy airspace disease in the right mid to lower lung, concerning for   multifocal pneumonia. There is a background of increased interstitial opacity is well. Much of   that is probably chronic but correlate with any clinical evidence of an   atypical pneumonia.              Discharge Medications     Medication List      START taking these medications    cefdinir 300 MG capsule  Commonly known as:  OMNICEF  Take 1 capsule by mouth 2 times daily for 5 days        CHANGE how you take these medications    levothyroxine 25 MCG tablet  Commonly known as: SYNTHROID  What changed:  Another medication with the same name was removed. Continue taking this medication, and follow the directions you see here. * predniSONE 10 MG tablet  Commonly known as:  Nhan Us  What changed:  Another medication with the same name was changed. Make sure you understand how and when to take each. * predniSONE 10 MG tablet  Commonly known as:  DELTASONE  4 tabs for 3 days 3 tabs for 3 days 2 tabs for 3 days 1 tabs for 3 days  What changed:    · medication strength  · how much to take  · how to take this  · when to take this  · additional instructions     simvastatin 20 MG tablet  Commonly known as:  ZOCOR  What changed:  Another medication with the same name was removed. Continue taking this medication, and follow the directions you see here. * This list has 2 medication(s) that are the same as other medications prescribed for you. Read the directions carefully, and ask your doctor or other care provider to review them with you. CONTINUE taking these medications    * albuterol sulfate  (90 Base) MCG/ACT inhaler  Commonly known as:  PROVENTIL HFA  Inhale 2 puffs into the lungs every 6 hours as needed for Wheezing or Shortness of Breath. * albuterol (2.5 MG/3ML) 0.083% nebulizer solution  Commonly known as:  PROVENTIL     busPIRone 10 MG tablet  Commonly known as:  BUSPAR     SPIRIVA HANDIHALER 18 MCG inhalation capsule  Generic drug:  tiotropium     SYMBICORT 160-4.5 MCG/ACT Aero  Generic drug:  budesonide-formoterol         * This list has 2 medication(s) that are the same as other medications prescribed for you. Read the directions carefully, and ask your doctor or other care provider to review them with you.             STOP taking these medications    acetaminophen 325 MG tablet  Commonly known as:  TYLENOL     ALPRAZolam 0.5 MG tablet  Commonly known as:  XANAX     guaiFENesin 600 MG extended release tablet  Commonly known as:  [address]

## 2019-03-21 NOTE — PROGRESS NOTES
Prescriptions: prednisone, omnicef [facility] and discharge instructions given. Pt verbalized understanding/ denies questions/ needs at this time. Transport called to transport pt to vehicle for discharge home.

## 2019-03-21 NOTE — PROGRESS NOTES
Shift assessment complete. Medications given per order. No c/o pain or SOB. Pt is requesting TUMs and metamucil; will follow up with MD. Family at bedside. Pt states all needs are met at this time.  Call light and bed side table within reach Electronically signed by Nini Moore RN on 3/20/2019 at 9:22 PM

## 2019-03-23 LAB
BLOOD CULTURE, ROUTINE: NORMAL
BLOOD CULTURE, ROUTINE: NORMAL

## 2019-09-03 ENCOUNTER — HOSPITAL ENCOUNTER (EMERGENCY)
Age: 79
Discharge: ANOTHER HOSPITAL | End: 2019-09-04
Attending: EMERGENCY MEDICINE
Payer: MEDICARE

## 2019-09-03 ENCOUNTER — APPOINTMENT (OUTPATIENT)
Dept: GENERAL RADIOLOGY | Age: 79
End: 2019-09-03
Payer: MEDICARE

## 2019-09-03 DIAGNOSIS — I21.4 NSTEMI (NON-ST ELEVATED MYOCARDIAL INFARCTION) (HCC): Primary | ICD-10-CM

## 2019-09-03 DIAGNOSIS — R06.02 SHORTNESS OF BREATH: ICD-10-CM

## 2019-09-03 LAB
A/G RATIO: 1.8 (ref 1.1–2.2)
ALBUMIN SERPL-MCNC: 4.2 G/DL (ref 3.4–5)
ALP BLD-CCNC: 59 U/L (ref 40–129)
ALT SERPL-CCNC: 21 U/L (ref 10–40)
ANION GAP SERPL CALCULATED.3IONS-SCNC: 10 MMOL/L (ref 3–16)
AST SERPL-CCNC: 21 U/L (ref 15–37)
BASE EXCESS VENOUS: 5.1 MMOL/L (ref -3–3)
BASOPHILS ABSOLUTE: 0.1 K/UL (ref 0–0.2)
BASOPHILS RELATIVE PERCENT: 1.3 %
BILIRUB SERPL-MCNC: 0.5 MG/DL (ref 0–1)
BUN BLDV-MCNC: 23 MG/DL (ref 7–20)
CALCIUM SERPL-MCNC: 10.1 MG/DL (ref 8.3–10.6)
CARBOXYHEMOGLOBIN: 2.3 % (ref 0–1.5)
CHLORIDE BLD-SCNC: 101 MMOL/L (ref 99–110)
CO2: 31 MMOL/L (ref 21–32)
CREAT SERPL-MCNC: 1.2 MG/DL (ref 0.6–1.2)
D DIMER: <200 NG/ML DDU (ref 0–229)
EOSINOPHILS ABSOLUTE: 0 K/UL (ref 0–0.6)
EOSINOPHILS RELATIVE PERCENT: 0.4 %
GFR AFRICAN AMERICAN: 53
GFR NON-AFRICAN AMERICAN: 43
GLOBULIN: 2.4 G/DL
GLUCOSE BLD-MCNC: 111 MG/DL (ref 70–99)
HCO3 VENOUS: 31.3 MMOL/L (ref 23–29)
HCT VFR BLD CALC: 48.9 % (ref 36–48)
HEMOGLOBIN: 15.9 G/DL (ref 12–16)
LYMPHOCYTES ABSOLUTE: 1.8 K/UL (ref 1–5.1)
LYMPHOCYTES RELATIVE PERCENT: 15.4 %
MCH RBC QN AUTO: 31 PG (ref 26–34)
MCHC RBC AUTO-ENTMCNC: 32.5 G/DL (ref 31–36)
MCV RBC AUTO: 95.6 FL (ref 80–100)
METHEMOGLOBIN VENOUS: 0.4 %
MONOCYTES ABSOLUTE: 0.8 K/UL (ref 0–1.3)
MONOCYTES RELATIVE PERCENT: 6.9 %
NEUTROPHILS ABSOLUTE: 8.8 K/UL (ref 1.7–7.7)
NEUTROPHILS RELATIVE PERCENT: 76 %
O2 CONTENT, VEN: 22 VOL %
O2 SAT, VEN: 97 %
O2 THERAPY: ABNORMAL
PCO2, VEN: 51.1 MMHG (ref 40–50)
PDW BLD-RTO: 14.3 % (ref 12.4–15.4)
PH VENOUS: 7.41 (ref 7.35–7.45)
PLATELET # BLD: 213 K/UL (ref 135–450)
PMV BLD AUTO: 8.8 FL (ref 5–10.5)
PO2, VEN: 90.3 MMHG (ref 25–40)
POTASSIUM SERPL-SCNC: 4.9 MMOL/L (ref 3.5–5.1)
PROCALCITONIN: 0.25 NG/ML (ref 0–0.15)
RBC # BLD: 5.11 M/UL (ref 4–5.2)
SODIUM BLD-SCNC: 142 MMOL/L (ref 136–145)
TCO2 CALC VENOUS: 33 MMOL/L
TOTAL PROTEIN: 6.6 G/DL (ref 6.4–8.2)
TROPONIN: 0.08 NG/ML
TROPONIN: 0.1 NG/ML
WBC # BLD: 11.6 K/UL (ref 4–11)

## 2019-09-03 PROCEDURE — 2580000003 HC RX 258: Performed by: PHYSICIAN ASSISTANT

## 2019-09-03 PROCEDURE — 85379 FIBRIN DEGRADATION QUANT: CPT

## 2019-09-03 PROCEDURE — 96375 TX/PRO/DX INJ NEW DRUG ADDON: CPT

## 2019-09-03 PROCEDURE — 6360000002 HC RX W HCPCS: Performed by: PHYSICIAN ASSISTANT

## 2019-09-03 PROCEDURE — 84145 PROCALCITONIN (PCT): CPT

## 2019-09-03 PROCEDURE — 82803 BLOOD GASES ANY COMBINATION: CPT

## 2019-09-03 PROCEDURE — 96367 TX/PROPH/DG ADDL SEQ IV INF: CPT

## 2019-09-03 PROCEDURE — 96365 THER/PROPH/DIAG IV INF INIT: CPT

## 2019-09-03 PROCEDURE — 93005 ELECTROCARDIOGRAM TRACING: CPT | Performed by: PHYSICIAN ASSISTANT

## 2019-09-03 PROCEDURE — 71046 X-RAY EXAM CHEST 2 VIEWS: CPT

## 2019-09-03 PROCEDURE — 96368 THER/DIAG CONCURRENT INF: CPT

## 2019-09-03 PROCEDURE — 6370000000 HC RX 637 (ALT 250 FOR IP): Performed by: PHYSICIAN ASSISTANT

## 2019-09-03 PROCEDURE — 85025 COMPLETE CBC W/AUTO DIFF WBC: CPT

## 2019-09-03 PROCEDURE — 80053 COMPREHEN METABOLIC PANEL: CPT

## 2019-09-03 PROCEDURE — 84484 ASSAY OF TROPONIN QUANT: CPT

## 2019-09-03 PROCEDURE — 99285 EMERGENCY DEPT VISIT HI MDM: CPT

## 2019-09-03 RX ORDER — HEPARIN SODIUM 10000 [USP'U]/100ML
12 INJECTION, SOLUTION INTRAVENOUS CONTINUOUS
Status: DISCONTINUED | OUTPATIENT
Start: 2019-09-03 | End: 2019-09-04 | Stop reason: HOSPADM

## 2019-09-03 RX ORDER — HEPARIN SODIUM 1000 [USP'U]/ML
30 INJECTION, SOLUTION INTRAVENOUS; SUBCUTANEOUS PRN
Status: DISCONTINUED | OUTPATIENT
Start: 2019-09-03 | End: 2019-09-04 | Stop reason: HOSPADM

## 2019-09-03 RX ORDER — HEPARIN SODIUM 1000 [USP'U]/ML
60 INJECTION, SOLUTION INTRAVENOUS; SUBCUTANEOUS PRN
Status: DISCONTINUED | OUTPATIENT
Start: 2019-09-03 | End: 2019-09-04 | Stop reason: HOSPADM

## 2019-09-03 RX ORDER — METHYLPREDNISOLONE SODIUM SUCCINATE 125 MG/2ML
125 INJECTION, POWDER, LYOPHILIZED, FOR SOLUTION INTRAMUSCULAR; INTRAVENOUS ONCE
Status: COMPLETED | OUTPATIENT
Start: 2019-09-03 | End: 2019-09-03

## 2019-09-03 RX ORDER — HEPARIN SODIUM 1000 [USP'U]/ML
60 INJECTION, SOLUTION INTRAVENOUS; SUBCUTANEOUS ONCE
Status: COMPLETED | OUTPATIENT
Start: 2019-09-03 | End: 2019-09-03

## 2019-09-03 RX ORDER — IPRATROPIUM BROMIDE AND ALBUTEROL SULFATE 2.5; .5 MG/3ML; MG/3ML
1 SOLUTION RESPIRATORY (INHALATION) ONCE
Status: COMPLETED | OUTPATIENT
Start: 2019-09-03 | End: 2019-09-03

## 2019-09-03 RX ORDER — NITROGLYCERIN 0.4 MG/1
0.4 TABLET SUBLINGUAL ONCE
Status: COMPLETED | OUTPATIENT
Start: 2019-09-03 | End: 2019-09-03

## 2019-09-03 RX ADMIN — METHYLPREDNISOLONE SODIUM SUCCINATE 125 MG: 125 INJECTION, POWDER, FOR SOLUTION INTRAMUSCULAR; INTRAVENOUS at 20:25

## 2019-09-03 RX ADMIN — HEPARIN SODIUM 2990 UNITS: 1000 INJECTION INTRAVENOUS; SUBCUTANEOUS at 23:48

## 2019-09-03 RX ADMIN — IPRATROPIUM BROMIDE AND ALBUTEROL SULFATE 1 AMPULE: .5; 3 SOLUTION RESPIRATORY (INHALATION) at 20:25

## 2019-09-03 RX ADMIN — HEPARIN SODIUM 598.8 UNITS/HR: 10000 INJECTION, SOLUTION INTRAVENOUS at 23:45

## 2019-09-03 RX ADMIN — CEFTRIAXONE SODIUM 1 G: 1 INJECTION, POWDER, FOR SOLUTION INTRAMUSCULAR; INTRAVENOUS at 22:16

## 2019-09-03 RX ADMIN — NITROGLYCERIN 0.4 MG: 0.4 TABLET SUBLINGUAL at 20:47

## 2019-09-03 RX ADMIN — ASPIRIN 325 MG: 325 TABLET, DELAYED RELEASE ORAL at 20:47

## 2019-09-03 RX ADMIN — AZITHROMYCIN DIHYDRATE 500 MG: 500 INJECTION, POWDER, LYOPHILIZED, FOR SOLUTION INTRAVENOUS at 23:02

## 2019-09-03 ASSESSMENT — HEART SCORE: ECG: 0

## 2019-09-03 ASSESSMENT — ENCOUNTER SYMPTOMS
SHORTNESS OF BREATH: 1
GASTROINTESTINAL NEGATIVE: 1

## 2019-09-04 ENCOUNTER — HOSPITAL ENCOUNTER (INPATIENT)
Age: 79
LOS: 3 days | Discharge: HOME | DRG: 280 | End: 2019-09-07
Attending: INTERNAL MEDICINE
Payer: MEDICARE

## 2019-09-04 ENCOUNTER — APPOINTMENT (OUTPATIENT)
Dept: CT IMAGING | Age: 79
DRG: 280 | End: 2019-09-04
Attending: INTERNAL MEDICINE
Payer: MEDICARE

## 2019-09-04 VITALS
RESPIRATION RATE: 27 BRPM | OXYGEN SATURATION: 98 % | TEMPERATURE: 96.7 F | SYSTOLIC BLOOD PRESSURE: 133 MMHG | HEIGHT: 64 IN | HEART RATE: 94 BPM | BODY MASS INDEX: 18.78 KG/M2 | DIASTOLIC BLOOD PRESSURE: 89 MMHG | WEIGHT: 110 LBS

## 2019-09-04 PROBLEM — R77.8 ELEVATED TROPONIN: Status: ACTIVE | Noted: 2019-09-04

## 2019-09-04 PROBLEM — R79.89 ELEVATED TROPONIN: Status: ACTIVE | Noted: 2019-09-04

## 2019-09-04 LAB
ANION GAP SERPL CALCULATED.3IONS-SCNC: 11 MMOL/L (ref 3–16)
ANTI-XA UNFRAC HEPARIN: 0.31 IU/ML (ref 0.3–0.7)
ANTI-XA UNFRAC HEPARIN: 0.45 IU/ML (ref 0.3–0.7)
ANTI-XA UNFRAC HEPARIN: 0.74 IU/ML (ref 0.3–0.7)
BASOPHILS ABSOLUTE: 0 K/UL (ref 0–0.2)
BASOPHILS RELATIVE PERCENT: 0.2 %
BUN BLDV-MCNC: 26 MG/DL (ref 7–20)
CALCIUM SERPL-MCNC: 9.3 MG/DL (ref 8.3–10.6)
CHLORIDE BLD-SCNC: 101 MMOL/L (ref 99–110)
CO2: 28 MMOL/L (ref 21–32)
CREAT SERPL-MCNC: 1.2 MG/DL (ref 0.6–1.2)
EKG ATRIAL RATE: 100 BPM
EKG ATRIAL RATE: 87 BPM
EKG DIAGNOSIS: NORMAL
EKG DIAGNOSIS: NORMAL
EKG P AXIS: 78 DEGREES
EKG P AXIS: 84 DEGREES
EKG P-R INTERVAL: 126 MS
EKG P-R INTERVAL: 128 MS
EKG Q-T INTERVAL: 322 MS
EKG Q-T INTERVAL: 412 MS
EKG QRS DURATION: 78 MS
EKG QRS DURATION: 88 MS
EKG QTC CALCULATION (BAZETT): 415 MS
EKG QTC CALCULATION (BAZETT): 495 MS
EKG R AXIS: -49 DEGREES
EKG R AXIS: -58 DEGREES
EKG T AXIS: 105 DEGREES
EKG T AXIS: 142 DEGREES
EKG VENTRICULAR RATE: 100 BPM
EKG VENTRICULAR RATE: 87 BPM
EOSINOPHILS ABSOLUTE: 0 K/UL (ref 0–0.6)
EOSINOPHILS RELATIVE PERCENT: 0 %
GFR AFRICAN AMERICAN: 53
GFR NON-AFRICAN AMERICAN: 43
GLUCOSE BLD-MCNC: 170 MG/DL (ref 70–99)
HCT VFR BLD CALC: 43.8 % (ref 36–48)
HEMOGLOBIN: 14.2 G/DL (ref 12–16)
INR BLD: 0.96 (ref 0.86–1.14)
LV EF: 50 %
LVEF MODALITY: NORMAL
LYMPHOCYTES ABSOLUTE: 0.3 K/UL (ref 1–5.1)
LYMPHOCYTES RELATIVE PERCENT: 4.4 %
MAGNESIUM: 2 MG/DL (ref 1.8–2.4)
MCH RBC QN AUTO: 31 PG (ref 26–34)
MCHC RBC AUTO-ENTMCNC: 32.4 G/DL (ref 31–36)
MCV RBC AUTO: 95.5 FL (ref 80–100)
MONOCYTES ABSOLUTE: 0 K/UL (ref 0–1.3)
MONOCYTES RELATIVE PERCENT: 0.5 %
NEUTROPHILS ABSOLUTE: 6.8 K/UL (ref 1.7–7.7)
NEUTROPHILS RELATIVE PERCENT: 94.9 %
PDW BLD-RTO: 14.3 % (ref 12.4–15.4)
PLATELET # BLD: 190 K/UL (ref 135–450)
PMV BLD AUTO: 8.3 FL (ref 5–10.5)
POTASSIUM REFLEX MAGNESIUM: 4.7 MMOL/L (ref 3.5–5.1)
PROTHROMBIN TIME: 11 SEC (ref 9.8–13)
RBC # BLD: 4.59 M/UL (ref 4–5.2)
SODIUM BLD-SCNC: 140 MMOL/L (ref 136–145)
TROPONIN: 0.08 NG/ML
WBC # BLD: 7.2 K/UL (ref 4–11)

## 2019-09-04 PROCEDURE — 85520 HEPARIN ASSAY: CPT

## 2019-09-04 PROCEDURE — 2700000000 HC OXYGEN THERAPY PER DAY

## 2019-09-04 PROCEDURE — 94640 AIRWAY INHALATION TREATMENT: CPT

## 2019-09-04 PROCEDURE — 80048 BASIC METABOLIC PNL TOTAL CA: CPT

## 2019-09-04 PROCEDURE — 6370000000 HC RX 637 (ALT 250 FOR IP): Performed by: STUDENT IN AN ORGANIZED HEALTH CARE EDUCATION/TRAINING PROGRAM

## 2019-09-04 PROCEDURE — 83735 ASSAY OF MAGNESIUM: CPT

## 2019-09-04 PROCEDURE — 94799 UNLISTED PULMONARY SVC/PX: CPT

## 2019-09-04 PROCEDURE — 85610 PROTHROMBIN TIME: CPT

## 2019-09-04 PROCEDURE — 2060000000 HC ICU INTERMEDIATE R&B

## 2019-09-04 PROCEDURE — 99223 1ST HOSP IP/OBS HIGH 75: CPT | Performed by: INTERNAL MEDICINE

## 2019-09-04 PROCEDURE — 84484 ASSAY OF TROPONIN QUANT: CPT

## 2019-09-04 PROCEDURE — 36415 COLL VENOUS BLD VENIPUNCTURE: CPT

## 2019-09-04 PROCEDURE — 6370000000 HC RX 637 (ALT 250 FOR IP)

## 2019-09-04 PROCEDURE — 93010 ELECTROCARDIOGRAM REPORT: CPT | Performed by: INTERNAL MEDICINE

## 2019-09-04 PROCEDURE — 6360000002 HC RX W HCPCS

## 2019-09-04 PROCEDURE — 94150 VITAL CAPACITY TEST: CPT

## 2019-09-04 PROCEDURE — 96366 THER/PROPH/DIAG IV INF ADDON: CPT

## 2019-09-04 PROCEDURE — 93005 ELECTROCARDIOGRAM TRACING: CPT

## 2019-09-04 PROCEDURE — 6360000002 HC RX W HCPCS: Performed by: INTERNAL MEDICINE

## 2019-09-04 PROCEDURE — 94761 N-INVAS EAR/PLS OXIMETRY MLT: CPT

## 2019-09-04 PROCEDURE — 71250 CT THORAX DX C-: CPT

## 2019-09-04 PROCEDURE — 85025 COMPLETE CBC W/AUTO DIFF WBC: CPT

## 2019-09-04 PROCEDURE — 93306 TTE W/DOPPLER COMPLETE: CPT

## 2019-09-04 RX ORDER — ACETAMINOPHEN 325 MG/1
650 TABLET ORAL EVERY 4 HOURS PRN
Status: DISCONTINUED | OUTPATIENT
Start: 2019-09-04 | End: 2019-09-05 | Stop reason: SDUPTHER

## 2019-09-04 RX ORDER — ONDANSETRON 2 MG/ML
4 INJECTION INTRAMUSCULAR; INTRAVENOUS EVERY 6 HOURS PRN
Status: DISCONTINUED | OUTPATIENT
Start: 2019-09-04 | End: 2019-09-07 | Stop reason: HOSPADM

## 2019-09-04 RX ORDER — HEPARIN SODIUM 10000 [USP'U]/100ML
12 INJECTION, SOLUTION INTRAVENOUS CONTINUOUS
Status: DISCONTINUED | OUTPATIENT
Start: 2019-09-04 | End: 2019-09-06

## 2019-09-04 RX ORDER — HEPARIN SODIUM 1000 [USP'U]/ML
30 INJECTION, SOLUTION INTRAVENOUS; SUBCUTANEOUS PRN
Status: DISCONTINUED | OUTPATIENT
Start: 2019-09-04 | End: 2019-09-06 | Stop reason: ALTCHOICE

## 2019-09-04 RX ORDER — HEPARIN SODIUM 1000 [USP'U]/ML
60 INJECTION, SOLUTION INTRAVENOUS; SUBCUTANEOUS PRN
Status: DISCONTINUED | OUTPATIENT
Start: 2019-09-04 | End: 2019-09-06 | Stop reason: ALTCHOICE

## 2019-09-04 RX ORDER — PANTOPRAZOLE SODIUM 40 MG/1
40 TABLET, DELAYED RELEASE ORAL
Status: DISCONTINUED | OUTPATIENT
Start: 2019-09-04 | End: 2019-09-07 | Stop reason: HOSPADM

## 2019-09-04 RX ORDER — ATORVASTATIN CALCIUM 40 MG/1
40 TABLET, FILM COATED ORAL NIGHTLY
Status: DISCONTINUED | OUTPATIENT
Start: 2019-09-04 | End: 2019-09-07 | Stop reason: HOSPADM

## 2019-09-04 RX ORDER — BUDESONIDE 0.5 MG/2ML
0.5 INHALANT ORAL 2 TIMES DAILY
Status: DISCONTINUED | OUTPATIENT
Start: 2019-09-04 | End: 2019-09-07 | Stop reason: HOSPADM

## 2019-09-04 RX ORDER — FORMOTEROL FUMARATE 20 UG/2ML
20 SOLUTION RESPIRATORY (INHALATION) 2 TIMES DAILY
Status: DISCONTINUED | OUTPATIENT
Start: 2019-09-04 | End: 2019-09-07 | Stop reason: HOSPADM

## 2019-09-04 RX ORDER — ALBUTEROL SULFATE 2.5 MG/3ML
2.5 SOLUTION RESPIRATORY (INHALATION) EVERY 4 HOURS PRN
Status: DISCONTINUED | OUTPATIENT
Start: 2019-09-04 | End: 2019-09-07 | Stop reason: HOSPADM

## 2019-09-04 RX ADMIN — PANTOPRAZOLE SODIUM 40 MG: 40 TABLET, DELAYED RELEASE ORAL at 05:58

## 2019-09-04 RX ADMIN — ASPIRIN 325 MG: 325 TABLET, DELAYED RELEASE ORAL at 03:26

## 2019-09-04 RX ADMIN — HEPARIN SODIUM 12 UNITS/KG/HR: 10000 INJECTION, SOLUTION INTRAVENOUS at 03:20

## 2019-09-04 RX ADMIN — BUDESONIDE 500 MCG: 0.5 SUSPENSION RESPIRATORY (INHALATION) at 19:55

## 2019-09-04 RX ADMIN — FORMOTEROL FUMARATE DIHYDRATE 20 MCG: 20 SOLUTION RESPIRATORY (INHALATION) at 08:30

## 2019-09-04 RX ADMIN — FORMOTEROL FUMARATE DIHYDRATE 20 MCG: 20 SOLUTION RESPIRATORY (INHALATION) at 19:55

## 2019-09-04 RX ADMIN — METOPROLOL TARTRATE 25 MG: 25 TABLET ORAL at 15:40

## 2019-09-04 RX ADMIN — ATORVASTATIN CALCIUM 40 MG: 40 TABLET, FILM COATED ORAL at 21:52

## 2019-09-04 RX ADMIN — TIOTROPIUM BROMIDE 18 MCG: 18 CAPSULE ORAL; RESPIRATORY (INHALATION) at 09:11

## 2019-09-04 RX ADMIN — ACETAMINOPHEN 650 MG: 325 TABLET ORAL at 03:26

## 2019-09-04 RX ADMIN — BUDESONIDE 500 MCG: 0.5 SUSPENSION RESPIRATORY (INHALATION) at 08:31

## 2019-09-04 RX ADMIN — METOPROLOL TARTRATE 25 MG: 25 TABLET ORAL at 21:52

## 2019-09-04 ASSESSMENT — PAIN DESCRIPTION - FREQUENCY: FREQUENCY: INTERMITTENT

## 2019-09-04 ASSESSMENT — PAIN DESCRIPTION - DESCRIPTORS: DESCRIPTORS: HEADACHE

## 2019-09-04 ASSESSMENT — PAIN SCALES - GENERAL
PAINLEVEL_OUTOF10: 3
PAINLEVEL_OUTOF10: 0

## 2019-09-04 ASSESSMENT — PAIN DESCRIPTION - PAIN TYPE: TYPE: ACUTE PAIN

## 2019-09-04 ASSESSMENT — PAIN DESCRIPTION - LOCATION: LOCATION: HEAD

## 2019-09-04 ASSESSMENT — PAIN DESCRIPTION - PROGRESSION: CLINICAL_PROGRESSION: GRADUALLY WORSENING

## 2019-09-04 ASSESSMENT — PAIN DESCRIPTION - ONSET: ONSET: ON-GOING

## 2019-09-04 NOTE — ED NOTES
Dr. Parr Shape accepted at [phone] to [facility]. Bed#4319 Bed 1 and Report# [phone].  [address]     Shereen Howe  46/[id number]

## 2019-09-04 NOTE — ED PROVIDER NOTES
PAST MEDICAL HISTORY     Past Medical History:   Diagnosis Date    Asthma     COPD [facility]chronic obstructive [facility] disease) [facility]     Hyperlipidemia     Hypertension     OA (osteoarthritis) 2014         SURGICAL HISTORY     Past Surgical History:   Procedure Laterality Date    BREAST SURGERY      half of right breast removed     SECTION           CURRENTMEDICATIONS       Previous Medications    ALBUTEROL (PROVENTIL HFA) 108 (90 BASE) MCG/ACT INHALER    Inhale 2 puffs into the lungs every 6 hours as needed for Wheezing or Shortness of Breath. ALBUTEROL (PROVENTIL) (2.5 MG/3ML) 0.083% NEBULIZER SOLUTION    VVN QID PRN SEVERE WHZ    BUDESONIDE-FORMOTEROL (SYMBICORT) 160-4.5 MCG/ACT AERO    Inhale 2 puffs into the lungs 2 times daily    TIOTROPIUM (SPIRIVA HANDIHALER) 18 MCG INHALATION CAPSULE    Inhale 18 mcg into the lungs daily         ALLERGIES     Codeine    FAMILYHISTORY       Family History   Problem Relation Age of Onset    Cancer Mother     Heart Disease Father     Stroke Father     Heart Disease Sister     Stroke Sister           SOCIAL HISTORY       Social History     Socioeconomic History    Marital status:       Spouse name: None    Number of children: None    Years of education: None    Highest education level: None   Occupational History    None   Social Needs    Financial resource strain: None    Food insecurity:     Worry: None     Inability: None    Transportation needs:     Medical: None     Non-medical: None   Tobacco Use    Smoking status: Current Every Day Smoker     Packs/day: 0.50     Years: 50.00     Pack years: 25.00     Types: Cigarettes    Smokeless tobacco: Never Used    Tobacco comment: advised to quit   Substance and Sexual Activity    Alcohol use: No    Drug use: No    Sexual activity: Not Currently     Comment: tab-  Day   Lifestyle    Physical activity:     Days per week: None     Minutes per session: None    Stress: None deformity. Neurological: She is alert and oriented to person, place, and time. Skin: Skin is warm and dry. She is not diaphoretic. Psychiatric: She has a normal mood and affect. Her behavior is normal.   Nursing note and vitals reviewed.       DIAGNOSTIC RESULTS   LABS:    Labs Reviewed   CBC WITH AUTO DIFFERENTIAL - Abnormal; Notable for the following components:       Result Value    WBC 11.6 (*)     Hematocrit 48.9 (*)     Neutrophils Absolute 8.8 (*)     All other components within normal limits    Narrative:     Performed at:  [facility],  [place]   Phone [phone]   COMPREHENSIVE METABOLIC PANEL - Abnormal; Notable for the following components:    Glucose 111 (*)     BUN 23 (*)     GFR Non- 43 (*)     GFR  53 (*)     All other components within normal limits    Narrative:     Performed at:  [facility],  [employer]   Phone [phone]   TROPONIN - Abnormal; Notable for the following components:    Troponin 0.10 (*)     All other components within normal limits    Narrative:     Performed at:  [facility],  [employer] [employer]   Phone [phone]   BLOOD GAS, VENOUS - Abnormal; Notable for the following components:    pCO2, Zach 51.1 (*)     pO2, Zach 90.3 (*)     HCO3, Venous 31.3 (*)     Base Excess, Zach 5.1 (*)     Carboxyhemoglobin 2.3 (*)     All other components within normal limits    Narrative:     Performed at:  [facility],  [employer]   Phone [phone]   PROCALCITONIN - Abnormal; Notable for the following components:    Procalcitonin 0.25 (*)     All other components within normal limits    Narrative:     Performed at:  [facility],  [employer]   Phone [phone] medications:  Medications   azithromycin (ZITHROMAX) 500 mg in D5W 250ml addavial (500 mg Intravenous New Bag 9/3/19 2302)   heparin (porcine) injection 2,990 Units (has no administration in time range)   heparin (porcine) injection 2,990 Units (has no administration in time range)   heparin (porcine) injection 1,500 Units (has no administration in time range)   heparin 25,000 units in dextrose 5% 250 mL infusion (has no administration in time range)   ipratropium-albuterol (DUONEB) nebulizer solution 1 ampule (1 ampule Inhalation Given 9/3/19 2025)   methylPREDNISolone sodium (SOLU-MEDROL) injection 125 mg (125 mg Intravenous Given 9/3/19 2025)   aspirin EC tablet 325 mg (325 mg Oral Given 9/3/19 2047)   nitroGLYCERIN (NITROSTAT) SL tablet 0.4 mg (0.4 mg Sublingual Given 9/3/19 2047)   cefTRIAXone (ROCEPHIN) 1 g IVPB in 50 mL D5W minibag (0 g Intravenous Stopped 9/3/19 2302)       Patient brought in for evaluation of increased shortness of breath after she swallowed a Mucinex pill earlier this morning at 1 AM.  Patient does have a history of COPD and is on 2 L of oxygen chronically at home. On exam today patient is on 2 L of nasal cannula here in the ER. She is afebrile appears nontoxic no respiratory distress. She does have decreased breath sounds noted on exam.  She is a white count of 11.6. No electrolyte abnormalities. Troponin is 0.1. EKG was reviewed by my attending please see his note for dictation. No acute ischemic changes were noted. Chest x-ray reveals asymmetric right mid to lower lung increased density. Correlation for possible pneumonia. Procalcitonin is elevated at 0.25. Patient given azithromycin and Rocephin here. Patient also given aspirin and nitro. I called and talked to [facility] cardiologist at [facility] who recommended heparin at this time.   We attempted first to get patient transferred to LTAC[facility] within [facility] however they did not have any more beds at the hospital.  At this time we will plan to transfer patient to [facility]  Repeat troponin is 0.08. I did consult hospitalist at [address] [place] [address] who agreed to accept this patient and transfer. Patient was stable at time of transfer. Patient seen by my attending. FINAL IMPRESSION      1. NSTEMI (non-ST elevated myocardial infarction) ([facility] [address]    2. Shortness of breath          DISPOSITION/PLAN   DISPOSITION        PATIENT REFERREDTO:  No follow-up provider specified.     DISCHARGE MEDICATIONS:  New Prescriptions    No medications on file       DISCONTINUED MEDICATIONS:  Discontinued Medications    BUSPIRONE (BUSPAR) 10 MG TABLET    Take 10 mg by mouth daily as needed    LEVOTHYROXINE (SYNTHROID) 25 MCG TABLET    Take 25 mcg by mouth Daily     PREDNISONE (DELTASONE) 10 MG TABLET    Take 10 mg by mouth daily    PREDNISONE (DELTASONE) 10 MG TABLET    4 tabs for 3 days 3 tabs for 3 days 2 tabs for 3 days 1 tabs for 3 days    SIMVASTATIN (ZOCOR) 20 MG TABLET    Take 20 mg by mouth nightly               (Please note that portions ofthis note were completed with a voice recognition program.  Efforts were made to edit the dictations but occasionally words are mis-transcribed.)    Arpita Castro PA-C (electronically signed)            Arpita Castro PA-C  09/03/19 6633

## 2019-09-04 NOTE — FLOWSHEET NOTE
09/04/19 1601   Encounter Summary   Services provided to: Patient and family together   Referral/Consult From: Nurse  James Dunn RN)   Support System Family members   Continue Visiting   (9/4, astrid )   Complexity of Encounter High   Length of Encounter 45 minutes   Routine   Type Initial   Assessment Calm; Approachable; Hopeful   Intervention Active listening;Explored feelings, thoughts, concerns   Outcome Comfort;Expressed gratitude;Engaged in conversation;Expressed feelings/needs/concerns   Advance Directives (For Healthcare)   Healthcare Directive No, patient does not have an advance directive for healthcare treatment   Information on Healthcare Directives Requested Yes   Patient Requests Assistance Yes, referral made to    Advance Directives Documents given;Documents explained   PT seems to bein good spirits with good support.   Staff Narcisa Arzate MA

## 2019-09-04 NOTE — PLAN OF CARE
Problem: Cardiac:  Goal: Ability to maintain vital signs within normal range will improve  Description  Ability to maintain vital signs within normal range will improve  Outcome: Ongoing  Note:   VSS this shift. Vitals:    09/04/19 0124 09/04/19 0323 09/04/19 0410   BP: (!) 141/81 111/73    Pulse: 93 91    Resp: 20 18 18   Temp: 97.2 °F (36.2 °C) 97 °F (36.1 °C)    TempSrc: Oral Oral    SpO2: 96% 94% 95%   Weight: 111 lb 5.3 oz (50.5 kg)     Height: 5' 4\" (1.626 m)          Goal: Cardiovascular alteration will improve  Description  Cardiovascular alteration will improve  Outcome: Ongoing  Note:   Pt troponin continuing to trend high regardless of heparin gtt administered per MAR.

## 2019-09-05 ENCOUNTER — APPOINTMENT (OUTPATIENT)
Dept: ULTRASOUND IMAGING | Age: 79
DRG: 280 | End: 2019-09-05
Attending: INTERNAL MEDICINE
Payer: MEDICARE

## 2019-09-05 LAB
ANION GAP SERPL CALCULATED.3IONS-SCNC: 9 MMOL/L (ref 3–16)
ANTI-XA UNFRAC HEPARIN: 0.32 IU/ML (ref 0.3–0.7)
BACTERIA: ABNORMAL /HPF
BASOPHILS ABSOLUTE: 0 K/UL (ref 0–0.2)
BASOPHILS RELATIVE PERCENT: 0.1 %
BILIRUBIN URINE: NEGATIVE
BLOOD, URINE: NEGATIVE
BUN BLDV-MCNC: 43 MG/DL (ref 7–20)
CALCIUM SERPL-MCNC: 9.3 MG/DL (ref 8.3–10.6)
CHLORIDE BLD-SCNC: 102 MMOL/L (ref 99–110)
CHOLESTEROL, TOTAL: 184 MG/DL (ref 0–199)
CLARITY: CLEAR
CO2: 29 MMOL/L (ref 21–32)
COLOR: YELLOW
CREAT SERPL-MCNC: 1.2 MG/DL (ref 0.6–1.2)
EKG ATRIAL RATE: 78 BPM
EKG DIAGNOSIS: NORMAL
EKG P AXIS: 82 DEGREES
EKG P-R INTERVAL: 146 MS
EKG Q-T INTERVAL: 378 MS
EKG QRS DURATION: 94 MS
EKG QTC CALCULATION (BAZETT): 430 MS
EKG R AXIS: -6 DEGREES
EKG T AXIS: 234 DEGREES
EKG VENTRICULAR RATE: 78 BPM
EOSINOPHILS ABSOLUTE: 0.1 K/UL (ref 0–0.6)
EOSINOPHILS RELATIVE PERCENT: 0.7 %
EPITHELIAL CELLS, UA: ABNORMAL /HPF
GFR AFRICAN AMERICAN: 53
GFR NON-AFRICAN AMERICAN: 43
GLUCOSE BLD-MCNC: 97 MG/DL (ref 70–99)
GLUCOSE URINE: NEGATIVE MG/DL
HCT VFR BLD CALC: 41.7 % (ref 36–48)
HDLC SERPL-MCNC: 76 MG/DL (ref 40–60)
HEMOGLOBIN: 13.6 G/DL (ref 12–16)
KETONES, URINE: ABNORMAL MG/DL
LDL CHOLESTEROL CALCULATED: 89 MG/DL
LEFT VENTRICULAR EJECTION FRACTION HIGH VALUE: 40 %
LEFT VENTRICULAR EJECTION FRACTION MODE: NORMAL
LEUKOCYTE ESTERASE, URINE: ABNORMAL
LV EF: 35 %
LYMPHOCYTES ABSOLUTE: 2.5 K/UL (ref 1–5.1)
LYMPHOCYTES RELATIVE PERCENT: 26.6 %
MAGNESIUM: 2.1 MG/DL (ref 1.8–2.4)
MCH RBC QN AUTO: 31.6 PG (ref 26–34)
MCHC RBC AUTO-ENTMCNC: 32.7 G/DL (ref 31–36)
MCV RBC AUTO: 96.5 FL (ref 80–100)
MICROSCOPIC EXAMINATION: YES
MONOCYTES ABSOLUTE: 0.7 K/UL (ref 0–1.3)
MONOCYTES RELATIVE PERCENT: 7.6 %
NEUTROPHILS ABSOLUTE: 6.1 K/UL (ref 1.7–7.7)
NEUTROPHILS RELATIVE PERCENT: 65 %
NITRITE, URINE: NEGATIVE
PDW BLD-RTO: 14.5 % (ref 12.4–15.4)
PH UA: 6 (ref 5–8)
PLATELET # BLD: 173 K/UL (ref 135–450)
PMV BLD AUTO: 8.7 FL (ref 5–10.5)
POTASSIUM REFLEX MAGNESIUM: 4.7 MMOL/L (ref 3.5–5.1)
PROTEIN UA: NEGATIVE MG/DL
RBC # BLD: 4.32 M/UL (ref 4–5.2)
RBC UA: ABNORMAL /HPF (ref 0–2)
SODIUM BLD-SCNC: 140 MMOL/L (ref 136–145)
SODIUM URINE: 44 MMOL/L
SPECIFIC GRAVITY UA: 1.01 (ref 1–1.03)
TRIGL SERPL-MCNC: 97 MG/DL (ref 0–150)
TROPONIN: 0.1 NG/ML
URIC ACID, SERUM: 7.5 MG/DL (ref 2.6–6)
URINE TYPE: ABNORMAL
UROBILINOGEN, URINE: 0.2 E.U./DL
VLDLC SERPL CALC-MCNC: 19 MG/DL
WBC # BLD: 9.3 K/UL (ref 4–11)
WBC UA: ABNORMAL /HPF (ref 0–5)

## 2019-09-05 PROCEDURE — B2151ZZ FLUOROSCOPY OF LEFT HEART USING LOW OSMOLAR CONTRAST: ICD-10-PCS | Performed by: INTERNAL MEDICINE

## 2019-09-05 PROCEDURE — C1894 INTRO/SHEATH, NON-LASER: HCPCS

## 2019-09-05 PROCEDURE — B41F1ZZ FLUOROSCOPY OF RIGHT LOWER EXTREMITY ARTERIES USING LOW OSMOLAR CONTRAST: ICD-10-PCS | Performed by: INTERNAL MEDICINE

## 2019-09-05 PROCEDURE — 99152 MOD SED SAME PHYS/QHP 5/>YRS: CPT

## 2019-09-05 PROCEDURE — 81001 URINALYSIS AUTO W/SCOPE: CPT

## 2019-09-05 PROCEDURE — 6360000002 HC RX W HCPCS: Performed by: INTERNAL MEDICINE

## 2019-09-05 PROCEDURE — 2580000003 HC RX 258: Performed by: PODIATRIST

## 2019-09-05 PROCEDURE — 80048 BASIC METABOLIC PNL TOTAL CA: CPT

## 2019-09-05 PROCEDURE — 76770 US EXAM ABDO BACK WALL COMP: CPT

## 2019-09-05 PROCEDURE — C1760 CLOSURE DEV, VASC: HCPCS

## 2019-09-05 PROCEDURE — 94761 N-INVAS EAR/PLS OXIMETRY MLT: CPT

## 2019-09-05 PROCEDURE — 94640 AIRWAY INHALATION TREATMENT: CPT

## 2019-09-05 PROCEDURE — 82570 ASSAY OF URINE CREATININE: CPT

## 2019-09-05 PROCEDURE — 2500000003 HC RX 250 WO HCPCS

## 2019-09-05 PROCEDURE — 6370000000 HC RX 637 (ALT 250 FOR IP): Performed by: STUDENT IN AN ORGANIZED HEALTH CARE EDUCATION/TRAINING PROGRAM

## 2019-09-05 PROCEDURE — 36415 COLL VENOUS BLD VENIPUNCTURE: CPT

## 2019-09-05 PROCEDURE — 2700000000 HC OXYGEN THERAPY PER DAY

## 2019-09-05 PROCEDURE — 6370000000 HC RX 637 (ALT 250 FOR IP)

## 2019-09-05 PROCEDURE — 4A023N7 MEASUREMENT OF CARDIAC SAMPLING AND PRESSURE, LEFT HEART, PERCUTANEOUS APPROACH: ICD-10-PCS | Performed by: INTERNAL MEDICINE

## 2019-09-05 PROCEDURE — 2060000000 HC ICU INTERMEDIATE R&B

## 2019-09-05 PROCEDURE — 85025 COMPLETE CBC W/AUTO DIFF WBC: CPT

## 2019-09-05 PROCEDURE — 93010 ELECTROCARDIOGRAM REPORT: CPT | Performed by: INTERNAL MEDICINE

## 2019-09-05 PROCEDURE — 6370000000 HC RX 637 (ALT 250 FOR IP): Performed by: INTERNAL MEDICINE

## 2019-09-05 PROCEDURE — 93458 L HRT ARTERY/VENTRICLE ANGIO: CPT | Performed by: INTERNAL MEDICINE

## 2019-09-05 PROCEDURE — 2709999900 HC NON-CHARGEABLE SUPPLY

## 2019-09-05 PROCEDURE — 84300 ASSAY OF URINE SODIUM: CPT

## 2019-09-05 PROCEDURE — 99233 SBSQ HOSP IP/OBS HIGH 50: CPT | Performed by: INTERNAL MEDICINE

## 2019-09-05 PROCEDURE — 85520 HEPARIN ASSAY: CPT

## 2019-09-05 PROCEDURE — B2131ZZ FLUOROSCOPY OF MULTIPLE CORONARY ARTERY BYPASS GRAFTS USING LOW OSMOLAR CONTRAST: ICD-10-PCS | Performed by: INTERNAL MEDICINE

## 2019-09-05 PROCEDURE — C1769 GUIDE WIRE: HCPCS

## 2019-09-05 PROCEDURE — 2580000003 HC RX 258: Performed by: INTERNAL MEDICINE

## 2019-09-05 PROCEDURE — 93005 ELECTROCARDIOGRAM TRACING: CPT | Performed by: STUDENT IN AN ORGANIZED HEALTH CARE EDUCATION/TRAINING PROGRAM

## 2019-09-05 PROCEDURE — 84484 ASSAY OF TROPONIN QUANT: CPT

## 2019-09-05 PROCEDURE — 80061 LIPID PANEL: CPT

## 2019-09-05 PROCEDURE — 93458 L HRT ARTERY/VENTRICLE ANGIO: CPT

## 2019-09-05 PROCEDURE — 84550 ASSAY OF BLOOD/URIC ACID: CPT

## 2019-09-05 PROCEDURE — 6360000002 HC RX W HCPCS

## 2019-09-05 PROCEDURE — B2111ZZ FLUOROSCOPY OF MULTIPLE CORONARY ARTERIES USING LOW OSMOLAR CONTRAST: ICD-10-PCS | Performed by: INTERNAL MEDICINE

## 2019-09-05 PROCEDURE — 83735 ASSAY OF MAGNESIUM: CPT

## 2019-09-05 PROCEDURE — 6360000004 HC RX CONTRAST MEDICATION: Performed by: INTERNAL MEDICINE

## 2019-09-05 RX ORDER — SODIUM CHLORIDE 9 MG/ML
INJECTION, SOLUTION INTRAVENOUS CONTINUOUS
Status: DISCONTINUED | OUTPATIENT
Start: 2019-09-05 | End: 2019-09-05 | Stop reason: SDUPTHER

## 2019-09-05 RX ORDER — METOPROLOL TARTRATE 50 MG/1
50 TABLET, FILM COATED ORAL 2 TIMES DAILY
Status: DISCONTINUED | OUTPATIENT
Start: 2019-09-05 | End: 2019-09-07 | Stop reason: HOSPADM

## 2019-09-05 RX ORDER — SODIUM CHLORIDE 9 MG/ML
INJECTION, SOLUTION INTRAVENOUS CONTINUOUS
Status: DISCONTINUED | OUTPATIENT
Start: 2019-09-05 | End: 2019-09-07

## 2019-09-05 RX ORDER — SODIUM CHLORIDE 9 MG/ML
INJECTION, SOLUTION INTRAVENOUS CONTINUOUS
Status: ACTIVE | OUTPATIENT
Start: 2019-09-05 | End: 2019-09-05

## 2019-09-05 RX ORDER — NITROGLYCERIN 0.4 MG/1
0.4 TABLET SUBLINGUAL ONCE
Status: COMPLETED | OUTPATIENT
Start: 2019-09-05 | End: 2019-09-05

## 2019-09-05 RX ORDER — MORPHINE SULFATE 2 MG/ML
1 INJECTION, SOLUTION INTRAMUSCULAR; INTRAVENOUS ONCE
Status: DISCONTINUED | OUTPATIENT
Start: 2019-09-05 | End: 2019-09-07 | Stop reason: HOSPADM

## 2019-09-05 RX ORDER — ACETAMINOPHEN 325 MG/1
650 TABLET ORAL EVERY 4 HOURS PRN
Status: DISCONTINUED | OUTPATIENT
Start: 2019-09-05 | End: 2019-09-07 | Stop reason: HOSPADM

## 2019-09-05 RX ADMIN — IOHEXOL 100 ML: 350 INJECTION, SOLUTION INTRAVENOUS at 11:32

## 2019-09-05 RX ADMIN — BUDESONIDE 500 MCG: 0.5 SUSPENSION RESPIRATORY (INHALATION) at 21:26

## 2019-09-05 RX ADMIN — ACETAMINOPHEN 650 MG: 325 TABLET ORAL at 22:36

## 2019-09-05 RX ADMIN — SODIUM CHLORIDE: 9 INJECTION, SOLUTION INTRAVENOUS at 13:19

## 2019-09-05 RX ADMIN — SODIUM CHLORIDE: 9 INJECTION, SOLUTION INTRAVENOUS at 17:59

## 2019-09-05 RX ADMIN — METOPROLOL TARTRATE 50 MG: 50 TABLET ORAL at 21:05

## 2019-09-05 RX ADMIN — SODIUM CHLORIDE: 9 INJECTION, SOLUTION INTRAVENOUS at 13:54

## 2019-09-05 RX ADMIN — FORMOTEROL FUMARATE DIHYDRATE 20 MCG: 20 SOLUTION RESPIRATORY (INHALATION) at 08:59

## 2019-09-05 RX ADMIN — ATORVASTATIN CALCIUM 40 MG: 40 TABLET, FILM COATED ORAL at 21:05

## 2019-09-05 RX ADMIN — TIOTROPIUM BROMIDE 18 MCG: 18 CAPSULE ORAL; RESPIRATORY (INHALATION) at 09:03

## 2019-09-05 RX ADMIN — NITROGLYCERIN 0.4 MG: 0.4 TABLET, ORALLY DISINTEGRATING SUBLINGUAL at 06:42

## 2019-09-05 RX ADMIN — HEPARIN SODIUM 10 UNITS/KG/HR: 10000 INJECTION, SOLUTION INTRAVENOUS at 16:55

## 2019-09-05 RX ADMIN — SODIUM CHLORIDE: 9 INJECTION, SOLUTION INTRAVENOUS at 12:00

## 2019-09-05 RX ADMIN — FORMOTEROL FUMARATE DIHYDRATE 20 MCG: 20 SOLUTION RESPIRATORY (INHALATION) at 21:25

## 2019-09-05 RX ADMIN — PANTOPRAZOLE SODIUM 40 MG: 40 TABLET, DELAYED RELEASE ORAL at 04:20

## 2019-09-05 RX ADMIN — ACETAMINOPHEN 650 MG: 325 TABLET ORAL at 14:12

## 2019-09-05 RX ADMIN — METOPROLOL TARTRATE 50 MG: 50 TABLET ORAL at 12:06

## 2019-09-05 RX ADMIN — BUDESONIDE 500 MCG: 0.5 SUSPENSION RESPIRATORY (INHALATION) at 08:59

## 2019-09-05 ASSESSMENT — PAIN DESCRIPTION - PAIN TYPE
TYPE: ACUTE PAIN
TYPE: ACUTE PAIN

## 2019-09-05 ASSESSMENT — PAIN DESCRIPTION - PROGRESSION
CLINICAL_PROGRESSION: GRADUALLY WORSENING
CLINICAL_PROGRESSION: NOT CHANGED

## 2019-09-05 ASSESSMENT — PAIN DESCRIPTION - FREQUENCY
FREQUENCY: INTERMITTENT
FREQUENCY: INTERMITTENT

## 2019-09-05 ASSESSMENT — PAIN SCALES - GENERAL
PAINLEVEL_OUTOF10: 4
PAINLEVEL_OUTOF10: 0
PAINLEVEL_OUTOF10: 5
PAINLEVEL_OUTOF10: 5
PAINLEVEL_OUTOF10: 6
PAINLEVEL_OUTOF10: 0

## 2019-09-05 ASSESSMENT — PAIN DESCRIPTION - ORIENTATION
ORIENTATION: MID
ORIENTATION: MID
ORIENTATION: RIGHT

## 2019-09-05 ASSESSMENT — PAIN DESCRIPTION - DESCRIPTORS
DESCRIPTORS: SORE
DESCRIPTORS: PRESSURE
DESCRIPTORS: PRESSURE

## 2019-09-05 ASSESSMENT — PAIN - FUNCTIONAL ASSESSMENT: PAIN_FUNCTIONAL_ASSESSMENT: PREVENTS OR INTERFERES SOME ACTIVE ACTIVITIES AND ADLS

## 2019-09-05 ASSESSMENT — PAIN DESCRIPTION - ONSET
ONSET: AWAKENED FROM SLEEP
ONSET: ON-GOING

## 2019-09-05 ASSESSMENT — PAIN DESCRIPTION - LOCATION
LOCATION: CHEST
LOCATION: GROIN

## 2019-09-05 NOTE — PLAN OF CARE
Problem: Falls - Risk of:  Goal: Will remain free from falls  Description  Will remain free from falls  9/5/2019 1618 by Dallas Berman RN  Outcome: Ongoing  Note:   Pt calling out appropriately to ambulate this shift. Pt in bed with alarm on, call light within reach and bed locked and in lowest position. Pt remains free of falls at this time.    Goal: Absence of physical injury  Description  Absence of physical injury  9/5/2019 1618 by Dallas Berman RN  Outcome: Ongoing       Problem: Pain:  Goal: Pain level will decrease  Description  Pain level will decrease  9/5/2019 1618 by Dallas Berman RN  Outcome: Ongoing    Goal: Control of acute pain  Description  Control of acute pain  9/5/2019 1618 by Dallas Berman RN  Outcome: Ongoing    Goal: Control of chronic pain  Description  Control of chronic pain  9/5/2019 1618 by Dallas Berman RN  Outcome: Ongoing       Problem: Cardiac:  Goal: Ability to maintain vital signs within normal range will improve  Description  Ability to maintain vital signs within normal range will improve  9/5/2019 1618 by Dallas Berman RN  Outcome: Ongoing    Outcome: OngoingGoal: Cardiovascular alteration will improve  Description  Cardiovascular alteration will improve  9/5/2019 1618 by Dallas Berman RN  Outcome: Ongoing

## 2019-09-05 NOTE — PROGRESS NOTES
Progress Note    Admit Date: 9/4/2019  Day: 1  Diet: DIET CARDIAC;    CC: Dyspnea    Interval history: Acute overnight event of chest pain and 1 dose of sublingual nitro was given and patient refused Morphine for pain control. Patient was seen at bedside this morning and accompanied by her daughter. Patient still feels some chest discomfort, but feels fine. Patient denies nausea, vomiting, fever, chills or shortness of breath. Am Vitals: BP: 103/66, HR: 72, RR:16, O2: 98% on 2L, T:97.0    I: 0 O: 0    HPI:  66 yo F PMH COPD, chronic hypoxic respiratory failure on home O2 therapy, tobacco use disorder p/w an episode of dyspnea from [facility]. Early the morning of 9/3/2019 the patient felt congested and broke a Mucinex in half to swallow, but the second part of the pill became stuck in her throat and was painful. Subsequently the pt became dyspneic, requiring an increase from a baseline of 2L to 3L O2. The pain was described as tight and located over the right side of the chest. This was transient, lasting only minutes and resolved, however her congestion continued. Pt endorsed an increase of mucous without cough. Currently no complaints of fever, chills, headache, lightheadedness, dizziness, dyspnea, chest pain, abdominal pain, N/V/D/C.      Medications:     Scheduled Meds:   morphine  1 mg Intravenous Once    metoprolol tartrate  50 mg Oral BID    aspirin  325 mg Oral Daily    tiotropium  18 mcg Inhalation Daily    pantoprazole  40 mg Oral QAM AC    budesonide  0.5 mg Nebulization BID    And    formoterol  20 mcg Nebulization BID    atorvastatin  40 mg Oral Nightly     Continuous Infusions:   sodium chloride      sodium chloride 100 mL/hr at 09/05/19 1354    heparin (porcine) Stopped (09/05/19 1030)     PRN Meds:acetaminophen, albuterol, heparin (porcine), heparin (porcine), magnesium hydroxide, ondansetron    Objective:   Vitals:   T-max:  Patient Vitals for the past 8 hrs:   BP Temp Temp src  190 173   MCV 95.6 95.5 96.5     Renal:    Recent Labs     09/03/19 1940 09/04/19  0533 09/05/19  0631    140 140   K 4.9 4.7 4.7    101 102   CO2 31 28 29   BUN 23* 26* 43*   CREATININE 1.2 1.2 1.2   GLUCOSE 111* 170* 97   CALCIUM 10.1 9.3 9.3   MG  --  2.00 2.10   ANIONGAP 10 11 9     Hepatic:   Recent Labs     09/03/19 1940   AST 21   ALT 21   BILITOT 0.5   PROT 6.6   LABALBU 4.2   ALKPHOS 59     Troponin:   Recent Labs     09/03/19  2218 09/04/19  0533 09/05/19  0947   TROPONINI 0.08* 0.08* 0.10*     BNP: No results for input(s): BNP in the last 72 hours. Lipids: No results for input(s): CHOL, HDL in the last 72 hours. Invalid input(s): LDLCALCU, TRIGLYCERIDE  ABGs:  No results for input(s): PHART, DLL8UDX, PO2ART, TGM8BPQ, BEART, THGBART, V3PGOIZE, MFK0WAA in the last 72 hours. INR:   Recent Labs     09/04/19  0534   INR 0.96     Lactate: No results for input(s): LACTATE in the last 72 hours. Cultures:  -----------------------------------------------------------------  RAD:   CT CHEST WO CONTRAST   Final Result     1. Mild bronchial wall thickening may represent bronchitis. 2.  Centrilobular emphysematous changes. 3.  Nonspecific 5 mm nodule in the right lower lobe. US RENAL COMPLETE    (Results Pending)       Assessment/Plan:   Jess Crump is a 67 yof admitted for NSTEMI. PMH of COPD chronic hypoxic respiratory failure on home O2 therapy, tobacco use disorder p/w an episode of dyspnea from [facility]      NSTEMI - Initial trop 0.1, fell to 0.08. Recent troponin was elevated at 0.10 this AM (9/05). Patient was seen this AM and admitted to having slight chest pain, and was given 1 subligual tablet and refused Morphine for the pain. Patient denies shortness or breath at this time. Patient was seen by Cardiology this AM and the patient agreed to a Cardiac Catheterization [facility] 09/5).    - Cardiac cath was performed and impression was LV dysfunction

## 2019-09-05 NOTE — CARE COORDINATION
Case Management Assessment           Daily Note                 Date/ Time of Note: 9/5/2019 4:33 PM         Note completed by: Rober Araiza    Patient Name: Carmine Bethea  YOB: 1940    Diagnosis:NSTEMI (non-ST elevated myocardial infarction) [facility]) [I21.4]  Patient Admission Status: Inpatient    Date of Admission:9/4/2019  1:21 AM Length of Stay: 1 GLOS:        Current Plan of Care: had cardiac cath today  ________________________________________________________________________________________  PT AM-PAC:   / 24 per last evaluation on: not ordered    OT AM-PAC:   / 24 per last evaluation on:       ________________________________________________________________________________________  Discharge Plan: Home    Tentative discharge date: TBD    Current barriers to discharge: none at this time    Referrals completed: Not Applicable    Resources/ information provided: Not indicated at this time  ________________________________________________________________________________________  Case Management Notes:  CM following for discharge planning. Patient is from home w/ family support, still works. Denies any       Connye Shy and her family were provided with choice of provider; she and her family are in agreement with the discharge plan.     Care Transition Patient: No    Rober Araiza RN  [facility]  Case Management Department  Ph: [phone]  Fax: [phone]

## 2019-09-05 NOTE — CONSULTS
was 1.2 which was previously 0.8. I was called for further management of acute kidney injury. Interval History:     She had coronary angiogram.  She received IV contrast.  CT chest revealed emphysema and nonspecific 5 mm nodule in the right lower lobe. ROS:     Seen with-family and nurse    positives in bold   Constitutional:  fever, chills, weakness, weight change, fatigue  Skin:  rash, pruritus, hair loss, bruising, dry skin, petechiae  Head, Face, Neck   headaches, swelling,  cervical adenopathy  Respiratory: shortness of breath, cough, or wheezing  Cardiovascular: chest pain, palpitations, dizzy, edema  Gastrointestinal: nausea, vomiting, diarrhea, constipation,belly pain    Yellow skin, blood in stool  Musculoskeletal:  back pain, muscle weakness, gait problems,       joint pain or swelling. Genitourinary:  dysuria, poor urine flow, flank pain, blood in urine  Neurologic:  vertigo, TIA'S, syncope, seizures, focal weakness  Psychosocial:  insomnia, anxiety, or depression. Additional positive findings:                          All other remaining systems are negative. PMH/PSH/SH/Family History:     Past Medical History:   Diagnosis Date    Asthma     COPD (chronic obstructive pulmonary disease) (HCC)     Hyperlipidemia     Hypertension     OA (osteoarthritis) 2014       Past Surgical History:   Procedure Laterality Date    BREAST SURGERY      half of right breast removed     SECTION         Social History     Socioeconomic History    Marital status:       Spouse name: Not on file    Number of children: Not on file    Years of education: Not on file    Highest education level: Not on file   Occupational History    Not on file   Social Needs    Financial resource strain: Not on file    Food insecurity:     Worry: Not on file     Inability: Not on file    Transportation needs:     Medical: Not on file     Non-medical: Not on file   Tobacco Use    Smoking status: Current Every Day Smoker     Packs/day: 0.50     Years: 50.00     Pack years: 25.00     Types: Cigarettes    Smokeless tobacco: Never Used    Tobacco comment: advised to quit   Substance and Sexual Activity    Alcohol use: No    Drug use: No    Sexual activity: Not Currently     Comment: tabacco- Pk Day   Lifestyle    Physical activity:     Days per week: Not on file     Minutes per session: Not on file    Stress: Not on file   Relationships    Social connections:     Talks on phone: Not on file     Gets together: Not on file     Attends [employer] service: Not on file     Active member of club or organization: Not on file     Attends meetings of clubs or organizations: Not on file     Relationship status: Not on file    Intimate partner violence:     Fear of current or ex partner: Not on file     Emotionally abused: Not on file     Physically abused: Not on file     Forced sexual activity: Not on file   Other Topics Concern    Not on file   Social History Narrative    Not on file           Problem Relation Age of Onset    Cancer Mother     Heart Disease Father     Stroke Father     Heart Disease Sister     Stroke Sister           Medication:     Scheduled Meds:   morphine  1 mg Intravenous Once    metoprolol tartrate  50 mg Oral BID    aspirin  325 mg Oral Daily    tiotropium  18 mcg Inhalation Daily    pantoprazole  40 mg Oral QAM AC    budesonide  0.5 mg Nebulization BID    And    formoterol  20 mcg Nebulization BID    atorvastatin  40 mg Oral Nightly     Continuous Infusions:   sodium chloride      sodium chloride 100 mL/hr at 09/05/19 1319    heparin (porcine) Stopped (09/05/19 1030)     PRN Meds:.acetaminophen, albuterol, heparin (porcine), heparin (porcine), magnesium hydroxide, ondansetron       Vitals :     Vitals:    09/05/19 1300   BP: (!) 140/65   Pulse:    Resp:    Temp:    SpO2:        I & O :       Intake/Output Summary (Last 24 hours) at 9/5/2019 1329  Last data filed at

## 2019-09-05 NOTE — CONSULTS
freely  Circulation:  2 - BP+/- 20mmHg of normal  Consciousness:  2 - Fully awake  Oxygen Saturation (color):  2 - Able to maintain oxygen saturation >92% on room air    Sedation/Anesthesia Plan:  Guard the patient's safety and welfare. Minimize physical discomfort and pain. Minimize negative psychological responses to treatment by providing sedation and analgesia and maximize the potential amnesia. Patient to meet pre-procedure discharge plan.     Medication Planned:  midazolam intravenously    Patient is an appropriate candidate for plan of sedation: yes      Electronically signed by Mark Garcia MD on 9/5/2019 at 11:00 AM

## 2019-09-05 NOTE — CONSULTS
Diagnosis Date    Asthma     [facility]     Hyperlipidemia     Hypertension     OA (osteoarthritis) 2014       Past Surgical History:   Procedure Laterality Date    BREAST SURGERY      half of right breast removed     SECTION         Home Medications:   Prior to Admission medications    Medication Sig Start Date End Date Taking? Authorizing Provider   tiotropium (SPIRIVA HANDIHALER) 18 MCG inhalation capsule Inhale 18 mcg into the lungs daily 16  Yes Historical Provider, MD   albuterol (PROVENTIL) (2.5 MG/3ML) 0.083% nebulizer solution VVN QID PRN SEVERE WHZ 3/4/19  Yes Historical Provider, MD   budesonide-formoterol (SYMBICORT) 160-4.5 MCG/ACT AERO Inhale 2 puffs into the lungs 2 times daily   Yes Historical Provider, MD   albuterol (PROVENTIL HFA) 108 (90 BASE) MCG/ACT inhaler Inhale 2 puffs into the lungs every 6 hours as needed for Wheezing or Shortness of Breath. 12  Yes Nakia Kaufman MD        Facility Administered Medications:    morphine  1 mg Intravenous Once    metoprolol tartrate  50 mg Oral BID    aspirin  325 mg Oral Daily    tiotropium  18 mcg Inhalation Daily    pantoprazole  40 mg Oral QAM AC    budesonide  0.5 mg Nebulization BID    And    formoterol  20 mcg Nebulization BID    atorvastatin  40 mg Oral Nightly       Allergies   Allergen Reactions    Codeine      \"hallucinations\"                     Social History     Socioeconomic History    Marital status:       Spouse name: Not on file    Number of children: Not on file    Years of education: Not on file    Highest education level: Not on file   Occupational History    Not on file   Social Needs    Financial resource strain: Not on file    Food insecurity:     Worry: Not on file     Inability: Not on file    Transportation needs:     Medical: Not on file     Non-medical: Not on file   Tobacco Use    Smoking status: Current Every Day Smoker

## 2019-09-06 ENCOUNTER — APPOINTMENT (OUTPATIENT)
Dept: VASCULAR LAB | Age: 79
DRG: 280 | End: 2019-09-06
Attending: INTERNAL MEDICINE
Payer: MEDICARE

## 2019-09-06 LAB
ALBUMIN SERPL-MCNC: 3.4 G/DL (ref 3.4–5)
ANION GAP SERPL CALCULATED.3IONS-SCNC: 7 MMOL/L (ref 3–16)
ANTI-XA UNFRAC HEPARIN: 0.22 IU/ML (ref 0.3–0.7)
ANTI-XA UNFRAC HEPARIN: 0.25 IU/ML (ref 0.3–0.7)
ANTI-XA UNFRAC HEPARIN: 0.62 IU/ML (ref 0.3–0.7)
APTT: 33.4 SEC (ref 26–36)
BASOPHILS ABSOLUTE: 0 K/UL (ref 0–0.2)
BASOPHILS RELATIVE PERCENT: 0.4 %
BUN BLDV-MCNC: 41 MG/DL (ref 7–20)
CALCIUM SERPL-MCNC: 8.8 MG/DL (ref 8.3–10.6)
CHLORIDE BLD-SCNC: 108 MMOL/L (ref 99–110)
CO2: 27 MMOL/L (ref 21–32)
CREAT SERPL-MCNC: 1.4 MG/DL (ref 0.6–1.2)
CREATININE URINE: 118.6 MG/DL (ref 28–259)
EOSINOPHILS ABSOLUTE: 0.1 K/UL (ref 0–0.6)
EOSINOPHILS RELATIVE PERCENT: 0.9 %
GFR AFRICAN AMERICAN: 44
GFR NON-AFRICAN AMERICAN: 36
GLUCOSE BLD-MCNC: 107 MG/DL (ref 70–99)
HCT VFR BLD CALC: 39.1 % (ref 36–48)
HCT VFR BLD CALC: 43.3 % (ref 36–48)
HEMOGLOBIN: 12.5 G/DL (ref 12–16)
HEMOGLOBIN: 14.1 G/DL (ref 12–16)
INR BLD: 0.91 (ref 0.86–1.14)
LYMPHOCYTES ABSOLUTE: 2.1 K/UL (ref 1–5.1)
LYMPHOCYTES RELATIVE PERCENT: 25.6 %
MAGNESIUM: 2 MG/DL (ref 1.8–2.4)
MCH RBC QN AUTO: 31.8 PG (ref 26–34)
MCH RBC QN AUTO: 31.8 PG (ref 26–34)
MCHC RBC AUTO-ENTMCNC: 32 G/DL (ref 31–36)
MCHC RBC AUTO-ENTMCNC: 32.5 G/DL (ref 31–36)
MCV RBC AUTO: 98 FL (ref 80–100)
MCV RBC AUTO: 99.2 FL (ref 80–100)
MONOCYTES ABSOLUTE: 0.6 K/UL (ref 0–1.3)
MONOCYTES RELATIVE PERCENT: 7.1 %
NEUTROPHILS ABSOLUTE: 5.4 K/UL (ref 1.7–7.7)
NEUTROPHILS RELATIVE PERCENT: 66 %
PDW BLD-RTO: 14.8 % (ref 12.4–15.4)
PDW BLD-RTO: 14.9 % (ref 12.4–15.4)
PHOSPHORUS: 3.6 MG/DL (ref 2.5–4.9)
PLATELET # BLD: 131 K/UL (ref 135–450)
PLATELET # BLD: 140 K/UL (ref 135–450)
PMV BLD AUTO: 8.8 FL (ref 5–10.5)
PMV BLD AUTO: 9.2 FL (ref 5–10.5)
POTASSIUM REFLEX MAGNESIUM: 4.6 MMOL/L (ref 3.5–5.1)
POTASSIUM SERPL-SCNC: 4.6 MMOL/L (ref 3.5–5.1)
PROTHROMBIN TIME: 10.4 SEC (ref 9.8–13)
RBC # BLD: 3.94 M/UL (ref 4–5.2)
RBC # BLD: 4.42 M/UL (ref 4–5.2)
SODIUM BLD-SCNC: 142 MMOL/L (ref 136–145)
WBC # BLD: 8.2 K/UL (ref 4–11)
WBC # BLD: 9.9 K/UL (ref 4–11)

## 2019-09-06 PROCEDURE — 99233 SBSQ HOSP IP/OBS HIGH 50: CPT | Performed by: INTERNAL MEDICINE

## 2019-09-06 PROCEDURE — 94640 AIRWAY INHALATION TREATMENT: CPT

## 2019-09-06 PROCEDURE — 36415 COLL VENOUS BLD VENIPUNCTURE: CPT

## 2019-09-06 PROCEDURE — 85027 COMPLETE CBC AUTOMATED: CPT

## 2019-09-06 PROCEDURE — 2060000000 HC ICU INTERMEDIATE R&B

## 2019-09-06 PROCEDURE — 6360000002 HC RX W HCPCS

## 2019-09-06 PROCEDURE — 85025 COMPLETE CBC W/AUTO DIFF WBC: CPT

## 2019-09-06 PROCEDURE — 94060 EVALUATION OF WHEEZING: CPT

## 2019-09-06 PROCEDURE — 6360000002 HC RX W HCPCS: Performed by: SURGERY

## 2019-09-06 PROCEDURE — 6370000000 HC RX 637 (ALT 250 FOR IP): Performed by: STUDENT IN AN ORGANIZED HEALTH CARE EDUCATION/TRAINING PROGRAM

## 2019-09-06 PROCEDURE — 94761 N-INVAS EAR/PLS OXIMETRY MLT: CPT

## 2019-09-06 PROCEDURE — 6370000000 HC RX 637 (ALT 250 FOR IP)

## 2019-09-06 PROCEDURE — 6360000002 HC RX W HCPCS: Performed by: INTERNAL MEDICINE

## 2019-09-06 PROCEDURE — 94760 N-INVAS EAR/PLS OXIMETRY 1: CPT

## 2019-09-06 PROCEDURE — 85610 PROTHROMBIN TIME: CPT

## 2019-09-06 PROCEDURE — 2700000000 HC OXYGEN THERAPY PER DAY

## 2019-09-06 PROCEDURE — 83735 ASSAY OF MAGNESIUM: CPT

## 2019-09-06 PROCEDURE — 80069 RENAL FUNCTION PANEL: CPT

## 2019-09-06 PROCEDURE — 93880 EXTRACRANIAL BILAT STUDY: CPT

## 2019-09-06 PROCEDURE — 85520 HEPARIN ASSAY: CPT

## 2019-09-06 PROCEDURE — 94664 DEMO&/EVAL PT USE INHALER: CPT

## 2019-09-06 PROCEDURE — 85730 THROMBOPLASTIN TIME PARTIAL: CPT

## 2019-09-06 PROCEDURE — 6370000000 HC RX 637 (ALT 250 FOR IP): Performed by: INTERNAL MEDICINE

## 2019-09-06 PROCEDURE — 94726 PLETHYSMOGRAPHY LUNG VOLUMES: CPT

## 2019-09-06 PROCEDURE — 94729 DIFFUSING CAPACITY: CPT

## 2019-09-06 PROCEDURE — 2580000003 HC RX 258: Performed by: INTERNAL MEDICINE

## 2019-09-06 RX ORDER — LIDOCAINE 4 G/G
1 PATCH TOPICAL DAILY
Status: DISCONTINUED | OUTPATIENT
Start: 2019-09-06 | End: 2019-09-07 | Stop reason: HOSPADM

## 2019-09-06 RX ORDER — HEPARIN SODIUM 5000 [USP'U]/ML
30 INJECTION, SOLUTION INTRAVENOUS; SUBCUTANEOUS PRN
Status: DISCONTINUED | OUTPATIENT
Start: 2019-09-06 | End: 2019-09-07

## 2019-09-06 RX ORDER — HEPARIN SODIUM 10000 [USP'U]/100ML
10 INJECTION, SOLUTION INTRAVENOUS CONTINUOUS
Status: DISCONTINUED | OUTPATIENT
Start: 2019-09-06 | End: 2019-09-07

## 2019-09-06 RX ORDER — HEPARIN SODIUM 5000 [USP'U]/ML
60 INJECTION, SOLUTION INTRAVENOUS; SUBCUTANEOUS ONCE
Status: DISCONTINUED | OUTPATIENT
Start: 2019-09-06 | End: 2019-09-06

## 2019-09-06 RX ORDER — ISOSORBIDE MONONITRATE 30 MG/1
30 TABLET, EXTENDED RELEASE ORAL DAILY
Status: DISCONTINUED | OUTPATIENT
Start: 2019-09-06 | End: 2019-09-07

## 2019-09-06 RX ORDER — HEPARIN SODIUM 5000 [USP'U]/ML
60 INJECTION, SOLUTION INTRAVENOUS; SUBCUTANEOUS PRN
Status: DISCONTINUED | OUTPATIENT
Start: 2019-09-06 | End: 2019-09-07

## 2019-09-06 RX ADMIN — ACETAMINOPHEN 650 MG: 325 TABLET ORAL at 05:09

## 2019-09-06 RX ADMIN — ALBUTEROL SULFATE 2.5 MG: 2.5 SOLUTION RESPIRATORY (INHALATION) at 17:12

## 2019-09-06 RX ADMIN — METOPROLOL TARTRATE 50 MG: 50 TABLET ORAL at 21:52

## 2019-09-06 RX ADMIN — BUDESONIDE 500 MCG: 0.5 SUSPENSION RESPIRATORY (INHALATION) at 10:26

## 2019-09-06 RX ADMIN — PANTOPRAZOLE SODIUM 40 MG: 40 TABLET, DELAYED RELEASE ORAL at 05:09

## 2019-09-06 RX ADMIN — BUDESONIDE 500 MCG: 0.5 SUSPENSION RESPIRATORY (INHALATION) at 23:57

## 2019-09-06 RX ADMIN — ALBUTEROL SULFATE 2.5 MG: 2.5 SOLUTION RESPIRATORY (INHALATION) at 05:36

## 2019-09-06 RX ADMIN — METOPROLOL TARTRATE 50 MG: 50 TABLET ORAL at 10:05

## 2019-09-06 RX ADMIN — ATORVASTATIN CALCIUM 40 MG: 40 TABLET, FILM COATED ORAL at 21:52

## 2019-09-06 RX ADMIN — HEPARIN SODIUM 12 UNITS/KG/HR: 10000 INJECTION, SOLUTION INTRAVENOUS at 01:40

## 2019-09-06 RX ADMIN — ISOSORBIDE MONONITRATE 30 MG: 30 TABLET, EXTENDED RELEASE ORAL at 10:05

## 2019-09-06 RX ADMIN — SODIUM CHLORIDE: 9 INJECTION, SOLUTION INTRAVENOUS at 14:20

## 2019-09-06 RX ADMIN — ASPIRIN 325 MG: 325 TABLET, DELAYED RELEASE ORAL at 10:05

## 2019-09-06 RX ADMIN — HEPARIN SODIUM 1600 UNITS: 5000 INJECTION INTRAVENOUS; SUBCUTANEOUS at 21:52

## 2019-09-06 RX ADMIN — SALINE NASAL SPRAY 2 SPRAY: 1.5 SOLUTION NASAL at 21:56

## 2019-09-06 RX ADMIN — HEPARIN SODIUM 1500 UNITS: 1000 INJECTION INTRAVENOUS; SUBCUTANEOUS at 01:40

## 2019-09-06 RX ADMIN — ACETAMINOPHEN 650 MG: 325 TABLET ORAL at 19:24

## 2019-09-06 RX ADMIN — FORMOTEROL FUMARATE DIHYDRATE 20 MCG: 20 SOLUTION RESPIRATORY (INHALATION) at 23:56

## 2019-09-06 RX ADMIN — TIOTROPIUM BROMIDE 18 MCG: 18 CAPSULE ORAL; RESPIRATORY (INHALATION) at 10:35

## 2019-09-06 RX ADMIN — ALBUTEROL SULFATE 2.5 MG: 2.5 SOLUTION RESPIRATORY (INHALATION) at 23:56

## 2019-09-06 RX ADMIN — HEPARIN SODIUM AND DEXTROSE 10 UNITS/KG/HR: 10000; 5 INJECTION INTRAVENOUS at 14:54

## 2019-09-06 RX ADMIN — ALBUTEROL SULFATE 2.5 MG: 2.5 SOLUTION RESPIRATORY (INHALATION) at 13:32

## 2019-09-06 RX ADMIN — FORMOTEROL FUMARATE DIHYDRATE 20 MCG: 20 SOLUTION RESPIRATORY (INHALATION) at 10:26

## 2019-09-06 ASSESSMENT — PAIN DESCRIPTION - FREQUENCY
FREQUENCY: INTERMITTENT
FREQUENCY: CONTINUOUS
FREQUENCY: INTERMITTENT

## 2019-09-06 ASSESSMENT — PAIN DESCRIPTION - PAIN TYPE
TYPE: ACUTE PAIN

## 2019-09-06 ASSESSMENT — PAIN DESCRIPTION - LOCATION
LOCATION: BACK
LOCATION: HEAD
LOCATION: BACK

## 2019-09-06 ASSESSMENT — PAIN DESCRIPTION - DESCRIPTORS
DESCRIPTORS: DISCOMFORT
DESCRIPTORS: HEADACHE
DESCRIPTORS: ACHING;DISCOMFORT

## 2019-09-06 ASSESSMENT — PAIN SCALES - GENERAL
PAINLEVEL_OUTOF10: 5
PAINLEVEL_OUTOF10: 0
PAINLEVEL_OUTOF10: 0
PAINLEVEL_OUTOF10: 8
PAINLEVEL_OUTOF10: 5
PAINLEVEL_OUTOF10: 0
PAINLEVEL_OUTOF10: 3

## 2019-09-06 ASSESSMENT — PAIN DESCRIPTION - ONSET
ONSET: GRADUAL
ONSET: ON-GOING
ONSET: ON-GOING

## 2019-09-06 ASSESSMENT — PAIN - FUNCTIONAL ASSESSMENT: PAIN_FUNCTIONAL_ASSESSMENT: ACTIVITIES ARE NOT PREVENTED

## 2019-09-06 ASSESSMENT — PAIN DESCRIPTION - PROGRESSION
CLINICAL_PROGRESSION: NOT CHANGED

## 2019-09-06 ASSESSMENT — PAIN DESCRIPTION - ORIENTATION: ORIENTATION: MID

## 2019-09-06 NOTE — PROGRESS NOTES
hours.  IMAGING:     Assessment:  Patient Active Problem List    Diagnosis Date Noted    Elevated troponin 09/04/2019    NSTEMI (non-ST elevated [facility] infarction) ([facility] 09/03/2019    Pneumonia due to organism 03/19/2019    Moderate [facility]cal[facility] 03/19/2019    Abnormal chest x-ray     COPD with acute exacerbation (HCC)     Shortness of breath     Tobacco abuse     Sepsis ([facility] 03/18/2019    COPD exacerbation ([facility] 12/29/2017    OA (osteoarthritis) 08/12/2014    Tobacco use 08/12/2014    Hyperlipidemia 05/07/2013    Asthma 05/07/2013     Imp: Non st/Trop/chest pain      Plan:  1. No chest pain over night. CVTS evaluating. Do not feel PCI is an option with LM and LAD at lg diagonal and with inferior wall motion abnormality. To have PFTs today. CVTS following.        Core Measures:  · Discharge instructions:   · LVEF documented:   · ACEI for LV dysfunction:   · Smoking Cessation:    Heidi Rodas MD 9/6/2019 1:13 PM

## 2019-09-06 NOTE — PROGRESS NOTES
Progress Note    Admit Date: 9/4/2019  Day: 9/6/19  Diet: DIET CARDIAC;    CC: NSTEMI    Interval history: No acute events overnight. Denies chest pain and SOB. Medications:     Scheduled Meds:   isosorbide mononitrate  30 mg Oral Daily    morphine  1 mg Intravenous Once    metoprolol tartrate  50 mg Oral BID    aspirin  325 mg Oral Daily    tiotropium  18 mcg Inhalation Daily    pantoprazole  40 mg Oral QAM AC    budesonide  0.5 mg Nebulization BID    And    formoterol  20 mcg Nebulization BID    atorvastatin  40 mg Oral Nightly     Continuous Infusions:   sodium chloride 100 mL/hr at 09/05/19 1759     PRN Meds:acetaminophen, albuterol, magnesium hydroxide, ondansetron    Objective:   Vitals:   T-max:  Patient Vitals for the past 8 hrs:   BP Temp Temp src Pulse Resp SpO2 Weight   09/06/19 1146 110/66 97.1 °F (36.2 °C) Oral 62 16 98 % --   09/06/19 1038 -- -- -- -- 16 98 % --   09/06/19 1003 (!) 141/92 97.7 °F (36.5 °C) Oral 77 20 100 % --   09/06/19 0627 -- -- -- -- -- -- 116 lb 6.5 oz (52.8 kg)   09/06/19 0516 134/79 97 °F (36.1 °C) Oral 65 20 97 % --       Intake/Output Summary (Last 24 hours) at 9/6/2019 1310  Last data filed at 9/6/2019 0629  Gross per 24 hour   Intake 540 ml   Output 400 ml   Net 140 ml       Review of Systems  13 point ROS negative. Physical Exam  Constitutional: She is oriented to person, place, and time. She appears well-developed. No distress. HENT:   Head: Normocephalic. Right Ear: External ear normal.   Left Ear: External ear normal.   Eyes: Pupils are equal, round, and reactive to light. Conjunctivae and EOM are normal. No scleral icterus. Neck: Normal range of motion. No JVD present. No tracheal deviation present. Cardiovascular: Normal rate, regular rhythm, normal heart sounds and normal pulses. Exam reveals no friction rub. No murmur heard. Pulmonary/Chest: Effort normal and breath sounds normal. No stridor. No respiratory distress. Abdominal: Soft.  Bowel sounds are normal. She exhibits no distension. There is no tenderness. There is no guarding. Musculoskeletal: Normal range of motion. She exhibits no edema or tenderness. Neurological: She is alert and oriented to person, place, and time. No cranial nerve deficit. Skin: Skin is warm and dry. No rash noted. She is not diaphoretic. No erythema. Psychiatric: She has a normal mood and affect. Her behavior is normal. Thought content normal.   LABS:    CBC:   Recent Labs     09/04/19  0534 09/05/19  0631 09/06/19  0506   WBC 7.2 9.3 8.2   HGB 14.2 13.6 14.1   HCT 43.8 41.7 43.3    173 140   MCV 95.5 96.5 98.0     Renal:    Recent Labs     09/04/19  0533 09/05/19  0631 09/06/19  0506    140 142   K 4.7 4.7 4.6  4.6    102 108   CO2 28 29 27   BUN 26* 43* 41*   CREATININE 1.2 1.2 1.4*   GLUCOSE 170* 97 107*   CALCIUM 9.3 9.3 8.8   MG 2.00 2.10 2.00   PHOS  --   --  3.6   ANIONGAP 11 9 7     Hepatic:   Recent Labs     09/03/19  1940 09/06/19  0506   AST 21  --    ALT 21  --    BILITOT 0.5  --    PROT 6.6  --    LABALBU 4.2 3.4   ALKPHOS 59  --      Troponin:   Recent Labs     09/03/19  2218 09/04/19  0533 09/05/19  0947   TROPONINI 0.08* 0.08* 0.10*     BNP: No results for input(s): BNP in the last 72 hours. Lipids:   Recent Labs     09/05/19  0631   CHOL 184   HDL 76*     ABGs:  No results for input(s): PHART, AMC2EAO, PO2ART, PPI4YBV, BEART, THGBART, A8IQYVMK, JWX3OFU in the last 72 hours. INR:   Recent Labs     09/04/19  0534   INR 0.96     Lactate: No results for input(s): LACTATE in the last 72 hours. Cultures:  -----------------------------------------------------------------  RAD:   VL DUP CAROTID BILATERAL         US RENAL COMPLETE   Final Result      Right kidney lower limits of normal in size      Small left kidney. No hydronephrosis. If concern for renal or ureteral calculi CT abdomen and pelvis without contrast recommended. CT CHEST WO CONTRAST   Final Result     1. Mild bronchial wall thickening may represent bronchitis. 2.  Centrilobular emphysematous changes. 3.  Nonspecific 5 mm nodule in the right lower lobe. Assessment/Plan:   Perry Tobin is a 67 yof admitted for NSTEMI. PMH of COPD chronic hypoxic respiratory failure on home O2 therapy, tobacco use disorder p/w an episode of dyspnea from [facility]     NSTEMI  - Cardiac cath was performed and impression was LV dysfunction with EF of 35-40%. Recommend surgical consult. Patient will discuss with the family. - EKG with T wave inversions in lateral leads not seen previously, but poor quality d/t artifact.   - Heparin gtt off (was on for 48 hours)  - Continue ASA, statin     COPD exacerbation, moderate - home O2 requirement of 2 L NC. Patient denies any shortness of breath and was wearing her NC flowing at 2L this AM.  - Continue albuterol nebs prn  - Continue dulera BID  - Continue Spiriva qd  - CT-chest (9/04) impression; without signs of aspiration, however 5 mm nodule RLL was found.     Acute kidney injury  Nephrology was consulted and following; Patient is at a moderate risk for contrast nephropathy. Continue IVF at 100mL/h.  - baseline Cr. 0.8, currently 1.2 and has been at this level since 9/04.  - will continue to monitor, likely prerenal as BUN/Cr ration > 20  - possibly more susceptible to kidney injury to possible underlying cardiorenal syndrome  - Renal U/S revealed small kidneys indicative of vascular disease and hypertension  - received contrast 9/5 from cardiac cath, continuing fluids     Hypertension  - Lopressor 50 BID    Chronic Tobacco use disorder   Patient has a history of  smoking 1-2 cigarettes a day for years. Went through a period of abstinence.    - Counseled on quitting      Code Status: Full code  FEN: Cardiac, no caffeine, NS @ 100cc/hr (got contrast yesterday)  PPX: Hep gtt, Protonix  DISPO: GMF    Jessica Noyola MD, PGY-1  09/06/19  1:10 PM    This patient has been staffed and discussed with Lio Rojas MD.

## 2019-09-07 VITALS
TEMPERATURE: 97 F | SYSTOLIC BLOOD PRESSURE: 102 MMHG | HEART RATE: 57 BPM | RESPIRATION RATE: 20 BRPM | OXYGEN SATURATION: 95 % | BODY MASS INDEX: 20.59 KG/M2 | WEIGHT: 120.59 LBS | DIASTOLIC BLOOD PRESSURE: 63 MMHG | HEIGHT: 64 IN

## 2019-09-07 LAB
ANION GAP SERPL CALCULATED.3IONS-SCNC: 7 MMOL/L (ref 3–16)
ANTI-XA UNFRAC HEPARIN: 0.61 IU/ML (ref 0.3–0.7)
BASOPHILS ABSOLUTE: 0 K/UL (ref 0–0.2)
BASOPHILS RELATIVE PERCENT: 0.2 %
BUN BLDV-MCNC: 29 MG/DL (ref 7–20)
CALCIUM SERPL-MCNC: 8.4 MG/DL (ref 8.3–10.6)
CHLORIDE BLD-SCNC: 108 MMOL/L (ref 99–110)
CO2: 25 MMOL/L (ref 21–32)
CREAT SERPL-MCNC: 1 MG/DL (ref 0.6–1.2)
EOSINOPHILS ABSOLUTE: 0.1 K/UL (ref 0–0.6)
EOSINOPHILS RELATIVE PERCENT: 1 %
GFR AFRICAN AMERICAN: >60
GFR NON-AFRICAN AMERICAN: 54
GLUCOSE BLD-MCNC: 128 MG/DL (ref 70–99)
HCT VFR BLD CALC: 36.5 % (ref 36–48)
HEMOGLOBIN: 12 G/DL (ref 12–16)
LYMPHOCYTES ABSOLUTE: 1.2 K/UL (ref 1–5.1)
LYMPHOCYTES RELATIVE PERCENT: 17.4 %
MAGNESIUM: 1.8 MG/DL (ref 1.8–2.4)
MCH RBC QN AUTO: 32 PG (ref 26–34)
MCHC RBC AUTO-ENTMCNC: 32.7 G/DL (ref 31–36)
MCV RBC AUTO: 97.9 FL (ref 80–100)
MONOCYTES ABSOLUTE: 0.5 K/UL (ref 0–1.3)
MONOCYTES RELATIVE PERCENT: 6.9 %
NEUTROPHILS ABSOLUTE: 5.1 K/UL (ref 1.7–7.7)
NEUTROPHILS RELATIVE PERCENT: 74.5 %
PDW BLD-RTO: 14.8 % (ref 12.4–15.4)
PLATELET # BLD: 105 K/UL (ref 135–450)
PMV BLD AUTO: 8.9 FL (ref 5–10.5)
POTASSIUM REFLEX MAGNESIUM: 4.2 MMOL/L (ref 3.5–5.1)
RBC # BLD: 3.73 M/UL (ref 4–5.2)
SODIUM BLD-SCNC: 140 MMOL/L (ref 136–145)
WBC # BLD: 6.8 K/UL (ref 4–11)

## 2019-09-07 PROCEDURE — 6370000000 HC RX 637 (ALT 250 FOR IP): Performed by: STUDENT IN AN ORGANIZED HEALTH CARE EDUCATION/TRAINING PROGRAM

## 2019-09-07 PROCEDURE — 36415 COLL VENOUS BLD VENIPUNCTURE: CPT

## 2019-09-07 PROCEDURE — 80048 BASIC METABOLIC PNL TOTAL CA: CPT

## 2019-09-07 PROCEDURE — 2580000003 HC RX 258: Performed by: INTERNAL MEDICINE

## 2019-09-07 PROCEDURE — 85025 COMPLETE CBC W/AUTO DIFF WBC: CPT

## 2019-09-07 PROCEDURE — 94761 N-INVAS EAR/PLS OXIMETRY MLT: CPT

## 2019-09-07 PROCEDURE — 2700000000 HC OXYGEN THERAPY PER DAY

## 2019-09-07 PROCEDURE — 85520 HEPARIN ASSAY: CPT

## 2019-09-07 PROCEDURE — 6370000000 HC RX 637 (ALT 250 FOR IP)

## 2019-09-07 PROCEDURE — 99233 SBSQ HOSP IP/OBS HIGH 50: CPT | Performed by: INTERNAL MEDICINE

## 2019-09-07 PROCEDURE — 83735 ASSAY OF MAGNESIUM: CPT

## 2019-09-07 PROCEDURE — 6360000002 HC RX W HCPCS

## 2019-09-07 PROCEDURE — 6360000002 HC RX W HCPCS: Performed by: INTERNAL MEDICINE

## 2019-09-07 PROCEDURE — 94640 AIRWAY INHALATION TREATMENT: CPT

## 2019-09-07 PROCEDURE — 6370000000 HC RX 637 (ALT 250 FOR IP): Performed by: INTERNAL MEDICINE

## 2019-09-07 RX ORDER — TRAMADOL HYDROCHLORIDE 50 MG/1
50 TABLET ORAL ONCE
Status: COMPLETED | OUTPATIENT
Start: 2019-09-07 | End: 2019-09-07

## 2019-09-07 RX ORDER — ISOSORBIDE MONONITRATE 60 MG/1
60 TABLET, EXTENDED RELEASE ORAL DAILY
Qty: 30 TABLET | Refills: 3 | Status: ON HOLD | OUTPATIENT
Start: 2019-09-08 | End: 2019-10-03 | Stop reason: HOSPADM

## 2019-09-07 RX ORDER — METOPROLOL TARTRATE 50 MG/1
50 TABLET, FILM COATED ORAL 2 TIMES DAILY
Qty: 60 TABLET | Refills: 3 | Status: ON HOLD | OUTPATIENT
Start: 2019-09-07 | End: 2019-09-24 | Stop reason: HOSPADM

## 2019-09-07 RX ORDER — CLOPIDOGREL BISULFATE 75 MG/1
75 TABLET ORAL DAILY
Qty: 30 TABLET | Refills: 3 | Status: SHIPPED | OUTPATIENT
Start: 2019-09-07

## 2019-09-07 RX ORDER — HEPARIN SODIUM 5000 [USP'U]/ML
5000 INJECTION, SOLUTION INTRAVENOUS; SUBCUTANEOUS EVERY 8 HOURS SCHEDULED
Status: DISCONTINUED | OUTPATIENT
Start: 2019-09-07 | End: 2019-09-07 | Stop reason: HOSPADM

## 2019-09-07 RX ORDER — CLOPIDOGREL BISULFATE 75 MG/1
75 TABLET ORAL DAILY
Status: DISCONTINUED | OUTPATIENT
Start: 2019-09-07 | End: 2019-09-07 | Stop reason: HOSPADM

## 2019-09-07 RX ORDER — ATORVASTATIN CALCIUM 40 MG/1
40 TABLET, FILM COATED ORAL NIGHTLY
Qty: 30 TABLET | Refills: 3 | Status: ON HOLD | OUTPATIENT
Start: 2019-09-07 | End: 2019-09-24 | Stop reason: HOSPADM

## 2019-09-07 RX ORDER — ISOSORBIDE MONONITRATE 60 MG/1
60 TABLET, EXTENDED RELEASE ORAL DAILY
Status: DISCONTINUED | OUTPATIENT
Start: 2019-09-07 | End: 2019-09-07 | Stop reason: HOSPADM

## 2019-09-07 RX ADMIN — TIOTROPIUM BROMIDE 18 MCG: 18 CAPSULE ORAL; RESPIRATORY (INHALATION) at 10:09

## 2019-09-07 RX ADMIN — SODIUM CHLORIDE: 9 INJECTION, SOLUTION INTRAVENOUS at 04:47

## 2019-09-07 RX ADMIN — CLOPIDOGREL BISULFATE 75 MG: 75 TABLET ORAL at 11:54

## 2019-09-07 RX ADMIN — METOPROLOL TARTRATE 50 MG: 50 TABLET ORAL at 08:52

## 2019-09-07 RX ADMIN — ACETAMINOPHEN 650 MG: 325 TABLET ORAL at 04:48

## 2019-09-07 RX ADMIN — PANTOPRAZOLE SODIUM 40 MG: 40 TABLET, DELAYED RELEASE ORAL at 04:49

## 2019-09-07 RX ADMIN — TRAMADOL HYDROCHLORIDE 50 MG: 50 TABLET, FILM COATED ORAL at 01:00

## 2019-09-07 RX ADMIN — BUDESONIDE 500 MCG: 0.5 SUSPENSION RESPIRATORY (INHALATION) at 10:06

## 2019-09-07 RX ADMIN — FORMOTEROL FUMARATE DIHYDRATE 20 MCG: 20 SOLUTION RESPIRATORY (INHALATION) at 10:06

## 2019-09-07 RX ADMIN — ISOSORBIDE MONONITRATE 60 MG: 60 TABLET, EXTENDED RELEASE ORAL at 08:52

## 2019-09-07 RX ADMIN — ASPIRIN 325 MG: 325 TABLET, DELAYED RELEASE ORAL at 08:52

## 2019-09-07 RX ADMIN — ALBUTEROL SULFATE 2.5 MG: 2.5 SOLUTION RESPIRATORY (INHALATION) at 10:09

## 2019-09-07 ASSESSMENT — PAIN SCALES - GENERAL
PAINLEVEL_OUTOF10: 0
PAINLEVEL_OUTOF10: 3
PAINLEVEL_OUTOF10: 6

## 2019-09-07 ASSESSMENT — PAIN DESCRIPTION - LOCATION
LOCATION: HEAD
LOCATION: BACK

## 2019-09-07 ASSESSMENT — PAIN DESCRIPTION - ONSET
ONSET: AWAKENED FROM SLEEP
ONSET: ON-GOING

## 2019-09-07 ASSESSMENT — PAIN DESCRIPTION - PROGRESSION
CLINICAL_PROGRESSION: GRADUALLY WORSENING
CLINICAL_PROGRESSION: NOT CHANGED

## 2019-09-07 ASSESSMENT — PAIN DESCRIPTION - PAIN TYPE
TYPE: ACUTE PAIN
TYPE: ACUTE PAIN

## 2019-09-07 ASSESSMENT — PAIN DESCRIPTION - DESCRIPTORS
DESCRIPTORS: ACHING;DISCOMFORT
DESCRIPTORS: HEADACHE

## 2019-09-07 ASSESSMENT — PAIN DESCRIPTION - FREQUENCY
FREQUENCY: INTERMITTENT
FREQUENCY: CONTINUOUS

## 2019-09-07 NOTE — PROGRESS NOTES
Discharge order received. Patient states she already has home o2. Denies wanting home care. Has several involved family members. Denies any further needs. Reviewed post angiogram instructions. Patient agrees to follow up with cardiology on sept 11. Patient agrees to call nephrology and pcp for follow up appointments. Discharged to home with family.  Gina Vilchis RN

## 2019-09-07 NOTE — PROGRESS NOTES
changes. 3.  Nonspecific 5 mm nodule in the right lower lobe. Assessment/Plan:   Benjamin Latham is [place] yof admitted for NSTEMI. PMH of COPD chronic hypoxic respiratory failure on home O2 therapy, tobacco use disorder p/w an episode of dyspnea from [address] cath was performed and impression was LV dysfunction with EF of 35-40%. Recommend surgical consult. Patient will discuss with the family. - EKG with T wave inversions in lateral leads not seen previously, but poor quality d/t artifact.   - Heparin gtt off (was on for 48 hours)  - continue ASA, statin  - started on clopidogrel,  - Imdur increased to 60 daily     COPD exacerbation, moderate - home O2 requirement of 2 L NC. Patient denies any shortness of breath and was wearing her NC flowing at 2L this AM.  - Continue albuterol nebs prn  - Continue dulera BID  - Continue Spiriva qd  - CT-chest (9/04) impression; without signs of aspiration, however 5 mm nodule RLL was found.     Acute kidney injury - resolved  Nephrology was consulted and following; Patient is at a moderate risk for contrast nephropathy. Continue IVF at 100mL/h.  - baseline Cr. 0.8, currently 1.2 and has been at this level since 9/04.  - will continue to monitor, likely prerenal as BUN/Cr ration > 20  - possibly more susceptible to kidney injury to possible underlying cardiorenal syndrome  - Renal U/S revealed small kidneys indicative of vascular disease and hypertension  - received contrast 9/5 from cardiac cath, continuing fluids     Hypertension  - Lopressor 50 BID     Chronic Tobacco use disorder   Patient has a history of  smoking 1-2 cigarettes a day for years. Went through a period of abstinence.    - Counseled on quitting      Code Status: Full code  FEN: Cardiac, no caffeine, NS @ 100cc/hr (got contrast yesterday)  PPX: Hep gtt, Protonix  DISPO: GMF    Alberto Cantrell MD, PGY-1  09/07/19  1:46 PM    This patient has been staffed and discussed with Tricia Wei MD.

## 2019-09-07 NOTE — PLAN OF CARE
Problem: Pain:  Goal: Pain level will decrease  Description  Pain level will decrease  9/7/2019 0341 by Jesse Minor RN  Outcome: Ongoing  Note:   Pt c/o back pain and discomfort this shift, lidocaine patch applied and tramadol administered which decreased pts level of pain this shift. Problem: Cardiac:  Goal: Ability to maintain vital signs within normal range will improve  Description  Ability to maintain vital signs within normal range will improve  9/7/2019 0341 by Jesse Minor RN  Outcome: Ongoing  Note:   Pt VSS this shift with no s/s of altered CV function this shift.

## 2019-09-07 NOTE — PROGRESS NOTES
formoterol  20 mcg Nebulization BID    atorvastatin  40 mg Oral Nightly     Continuous Infusions:   sodium chloride 100 mL/hr at 09/07/19 0447     PRN Meds:.heparin (porcine), heparin (porcine), sodium chloride, acetaminophen, albuterol, magnesium hydroxide, ondansetron       Vitals :     Vitals:    09/07/19 0845   BP: (!) 147/90   Pulse: 69   Resp: 22   Temp: 97.3 °F (36.3 °C)   SpO2: 98%       I & O :       Intake/Output Summary (Last 24 hours) at 9/7/2019 0949  Last data filed at 9/7/2019 0437  Gross per 24 hour   Intake 1660 ml   Output 300 ml   Net 1360 ml        Physical Examination :     General appearance: Anxious- no, distressed- no, in good spirits- yes    HEENT: Lips- normal, teeth- ok , oral mucosa- moist  Neck : Mass- no, appears symmetrical, JVD- not visible  Respiratory: Respiratory effort-okay, wheeze- no, crackles -none  Cardiovascular:  Ausculation- No M/R/G, Edema none  Abdomen: visible mass- no, distention- no, scar- no, tenderness- no                            hepatosplenomegaly-  no  Musculoskeletal:  clubbing no,cyanosis- no , digital ischemia- no                           muscle strength- grossly normal , tone - grossly normal  Skin: rashes- no , ulcers- no, induration- no, tightening - no  Psychiatric:  Judgement and insight- normal           AAO X 3     LABS:     Recent Labs     09/06/19  0506 09/06/19  1647 09/07/19  0429   WBC 8.2 9.9 6.8   HGB 14.1 12.5 12.0   HCT 43.3 39.1 36.5    131* 105*     Recent Labs     09/05/19  0631 09/06/19  0506 09/07/19  0429    142 140   K 4.7 4.6  4.6 4.2    108 108   CO2 29 27 25   BUN 43* 41* 29*   CREATININE 1.2 1.4* 1.0   GLUCOSE 97 107* 128*   MG 2.10 2.00 1.80   PHOS  --  3.6  --         Nephrology  [address], [address]  Office: [phone]  Cell: [phone]  Fax: [phone]

## 2019-09-07 NOTE — DISCHARGE SUMMARY
INTERNAL MEDICINE DEPARTMENT AT [address] [place] [address]  DISCHARGE SUMMARY    Patient ID: 95 Fanny Nazario                                             Discharge Date: 9/8/2019   Patient's PCP: Johnie Tejeda MD                                          Discharge Physician: Oracio Carbajal MD  Admit Date: 9/4/2019   Admitting Physician: Beth Rdz MD    PROBLEMS DURING HOSPITALIZATION:  Present on Admission:   NSTEMI (non-ST elevated myocardial infarction) ([address]   Elevated troponin      DISCHARGE DIAGNOSES:    HPI: 66 yo F PMH COPD, chronic hypoxic respiratory failure on home O2 therapy, tobacco use disorder p/w an episode of dyspnea from [facility]. Early the morning of 9/3/2019 the patient felt congested and broke a Mucinex in half to swallow, but the second part of the pill became stuck in her throat and was painful. Subsequently the pt became dyspneic, requiring an increase from a baseline of 2L to 3L O2. The pain was described as tight and located over the right side of the chest. This was transient, lasting only minutes and resolved, however her congestion continued. Pt endorsed an increase of mucous without cough. She endorsed that she does have allergies during this season as well. The following issues were addressed during hospitalization:  NSTEMI  Acute hypoxic respiratory failure    Hospital course: Ms. Mario Kevin was admitted and her hypoxia quickly resolved with only slight increase in O2 requirement. She was found to have NSTEMI however and underwent left heart cath which showed left main and left anterior descending disease. Cardiothoracic surgery evaluated the patient and found her to be a poor surgical candidate as her pulmonary function testing revealed poor pulmonary reserve and function, worse than her prior. It was decided that she would be medically managed. Her Imdur was increased to 60mg daily, and she was placed on  and clopidogrel 75 daily.  Nephrology team was She had a coronary angiogram which showed left main stenosis and LAD stenosis. She was seen by cardiothoracic surgery who deemed her very high risk given her extremely poor pulmonary function. I also spoke to cardiology who felt that her lesions were not amenable to any kind of percutaneous coronary intervention. Overall this left her at high risk of future events. Also explained that we had optimized medical therapy which was the recommendation per cardiology including addition of isosorbide mononitrate and she has been started on Plavix. Currently she is pain-free.   She has completed 48 hours of heparin  The patient and family members voiced understanding of all of the above and are in agreement with the plan  Time spent 45 minutes      Olamide Lira MD  Hospitalist

## 2019-09-08 ENCOUNTER — APPOINTMENT (OUTPATIENT)
Dept: CT IMAGING | Age: 79
DRG: 235 | End: 2019-09-08
Payer: MEDICARE

## 2019-09-08 ENCOUNTER — APPOINTMENT (OUTPATIENT)
Dept: GENERAL RADIOLOGY | Age: 79
DRG: 235 | End: 2019-09-08
Payer: MEDICARE

## 2019-09-08 ENCOUNTER — HOSPITAL ENCOUNTER (INPATIENT)
Age: 79
LOS: 16 days | Discharge: NURSING, REHAB OR LONG-TERM CARE | DRG: 235 | End: 2019-09-24
Attending: EMERGENCY MEDICINE | Admitting: INTERNAL MEDICINE
Payer: MEDICARE

## 2019-09-08 DIAGNOSIS — J81.0 ACUTE PULMONARY EDEMA (HCC): ICD-10-CM

## 2019-09-08 DIAGNOSIS — Z95.1 STATUS POST AORTO-CORONARY ARTERY BYPASS GRAFT: Primary | ICD-10-CM

## 2019-09-08 DIAGNOSIS — J96.01 ACUTE RESPIRATORY FAILURE WITH HYPOXIA (HCC): ICD-10-CM

## 2019-09-08 DIAGNOSIS — R06.02 SHORTNESS OF BREATH: ICD-10-CM

## 2019-09-08 DIAGNOSIS — R73.9 HYPERGLYCEMIA: ICD-10-CM

## 2019-09-08 PROBLEM — J96.20 ACUTE ON CHRONIC RESPIRATORY FAILURE (HCC): Status: ACTIVE | Noted: 2019-09-08

## 2019-09-08 PROBLEM — R91.8 PULMONARY INFILTRATE: Status: ACTIVE | Noted: 2019-09-08

## 2019-09-08 PROBLEM — J81.1 PULMONARY EDEMA: Status: ACTIVE | Noted: 2019-09-08

## 2019-09-08 PROBLEM — I25.10 CAD (CORONARY ARTERY DISEASE): Status: ACTIVE | Noted: 2019-09-08

## 2019-09-08 PROBLEM — R79.89 ELEVATED BRAIN NATRIURETIC PEPTIDE (BNP) LEVEL: Status: ACTIVE | Noted: 2019-09-08

## 2019-09-08 PROBLEM — D72.829 LEUKOCYTOSIS: Status: ACTIVE | Noted: 2019-09-08

## 2019-09-08 LAB
A/G RATIO: 1.2 (ref 1.1–2.2)
ALBUMIN SERPL-MCNC: 3.5 G/DL (ref 3.4–5)
ALP BLD-CCNC: 103 U/L (ref 40–129)
ALT SERPL-CCNC: 66 U/L (ref 10–40)
ANION GAP SERPL CALCULATED.3IONS-SCNC: 10 MMOL/L (ref 3–16)
AST SERPL-CCNC: 64 U/L (ref 15–37)
BASE EXCESS ARTERIAL: -2.2 MMOL/L (ref -3–3)
BASE EXCESS ARTERIAL: -3.3 MMOL/L (ref -3–3)
BASE EXCESS VENOUS: -7.3 MMOL/L (ref -3–3)
BASE EXCESS VENOUS: -8.2 MMOL/L (ref -3–3)
BASOPHILS ABSOLUTE: 0.2 K/UL (ref 0–0.2)
BASOPHILS RELATIVE PERCENT: 1.4 %
BILIRUB SERPL-MCNC: 0.4 MG/DL (ref 0–1)
BILIRUBIN URINE: NEGATIVE
BLOOD, URINE: ABNORMAL
BUN BLDV-MCNC: 23 MG/DL (ref 7–20)
CALCIUM SERPL-MCNC: 9.1 MG/DL (ref 8.3–10.6)
CARBOXYHEMOGLOBIN ARTERIAL: 0.1 % (ref 0–1.5)
CARBOXYHEMOGLOBIN ARTERIAL: 0.7 % (ref 0–1.5)
CARBOXYHEMOGLOBIN: 1.3 % (ref 0–1.5)
CARBOXYHEMOGLOBIN: 4.3 % (ref 0–1.5)
CHLORIDE BLD-SCNC: 104 MMOL/L (ref 99–110)
CLARITY: CLEAR
CO2: 25 MMOL/L (ref 21–32)
COLOR: YELLOW
CREAT SERPL-MCNC: 1.1 MG/DL (ref 0.6–1.2)
EKG ATRIAL RATE: 134 BPM
EKG DIAGNOSIS: NORMAL
EKG P AXIS: 84 DEGREES
EKG P-R INTERVAL: 140 MS
EKG Q-T INTERVAL: 294 MS
EKG QRS DURATION: 86 MS
EKG QTC CALCULATION (BAZETT): 439 MS
EKG R AXIS: -40 DEGREES
EKG T AXIS: 101 DEGREES
EKG VENTRICULAR RATE: 134 BPM
EOSINOPHILS ABSOLUTE: 0.2 K/UL (ref 0–0.6)
EOSINOPHILS RELATIVE PERCENT: 1.2 %
EPITHELIAL CELLS, UA: ABNORMAL /HPF
GFR AFRICAN AMERICAN: 58
GFR NON-AFRICAN AMERICAN: 48
GLOBULIN: 3 G/DL
GLUCOSE BLD-MCNC: 226 MG/DL (ref 70–99)
GLUCOSE BLD-MCNC: 233 MG/DL (ref 70–99)
GLUCOSE URINE: 250 MG/DL
HCO3 ARTERIAL: 21.9 MMOL/L (ref 21–29)
HCO3 ARTERIAL: 22 MMOL/L (ref 21–29)
HCO3 VENOUS: 15.6 MMOL/L (ref 23–29)
HCO3 VENOUS: 20.6 MMOL/L (ref 23–29)
HCT VFR BLD CALC: 42.9 % (ref 36–48)
HEMOGLOBIN, ART, EXTENDED: 12.9 G/DL (ref 12–16)
HEMOGLOBIN, ART, EXTENDED: 13.7 G/DL (ref 12–16)
HEMOGLOBIN: 13.8 G/DL (ref 12–16)
KETONES, URINE: NEGATIVE MG/DL
LACTIC ACID: 1.8 MMOL/L (ref 0.4–2)
LEUKOCYTE ESTERASE, URINE: NEGATIVE
LYMPHOCYTES ABSOLUTE: 3.4 K/UL (ref 1–5.1)
LYMPHOCYTES RELATIVE PERCENT: 22.3 %
MAGNESIUM: 2.2 MG/DL (ref 1.8–2.4)
MCH RBC QN AUTO: 31.9 PG (ref 26–34)
MCHC RBC AUTO-ENTMCNC: 32.2 G/DL (ref 31–36)
MCV RBC AUTO: 99 FL (ref 80–100)
METHEMOGLOBIN ARTERIAL: 0.4 %
METHEMOGLOBIN ARTERIAL: 0.7 %
METHEMOGLOBIN VENOUS: 0.6 %
METHEMOGLOBIN VENOUS: 0.7 %
MICROSCOPIC EXAMINATION: YES
MONOCYTES ABSOLUTE: 0.8 K/UL (ref 0–1.3)
MONOCYTES RELATIVE PERCENT: 5.2 %
NEUTROPHILS ABSOLUTE: 10.6 K/UL (ref 1.7–7.7)
NEUTROPHILS RELATIVE PERCENT: 69.9 %
NITRITE, URINE: NEGATIVE
O2 CONTENT ARTERIAL: 18 ML/DL
O2 CONTENT ARTERIAL: 19 ML/DL
O2 CONTENT, VEN: 16 VOL %
O2 CONTENT, VEN: 18 VOL %
O2 SAT, ARTERIAL: 96.6 %
O2 SAT, ARTERIAL: 98.8 %
O2 SAT, VEN: 97 %
O2 SAT, VEN: 98 %
O2 THERAPY: ABNORMAL
O2 THERAPY: NORMAL
PCO2 ARTERIAL: 36.1 MMHG (ref 35–45)
PCO2 ARTERIAL: 40 MMHG (ref 35–45)
PCO2, VEN: 27.8 MMHG (ref 40–50)
PCO2, VEN: 52.5 MMHG (ref 40–50)
PDW BLD-RTO: 14.8 % (ref 12.4–15.4)
PERFORMED ON: ABNORMAL
PH ARTERIAL: 7.36 (ref 7.35–7.45)
PH ARTERIAL: 7.4 (ref 7.35–7.45)
PH UA: 6 (ref 5–8)
PH VENOUS: 7.21 (ref 7.35–7.45)
PH VENOUS: 7.37 (ref 7.35–7.45)
PLATELET # BLD: 158 K/UL (ref 135–450)
PMV BLD AUTO: 9.6 FL (ref 5–10.5)
PO2 ARTERIAL: 138.3 MMHG (ref 75–108)
PO2 ARTERIAL: 85.6 MMHG (ref 75–108)
PO2, VEN: 118.4 MMHG (ref 25–40)
PO2, VEN: 95.7 MMHG (ref 25–40)
POTASSIUM SERPL-SCNC: 4.8 MMOL/L (ref 3.5–5.1)
PRO-BNP: 1790 PG/ML (ref 0–449)
PROTEIN UA: 100 MG/DL
RBC # BLD: 4.34 M/UL (ref 4–5.2)
RBC UA: ABNORMAL /HPF (ref 0–2)
REASON FOR REJECTION: NORMAL
REASON FOR REJECTION: NORMAL
REJECTED TEST: NORMAL
REJECTED TEST: NORMAL
SODIUM BLD-SCNC: 139 MMOL/L (ref 136–145)
SPECIFIC GRAVITY UA: 1.02 (ref 1–1.03)
TCO2 ARTERIAL: 23.1 MMOL/L
TCO2 ARTERIAL: 23.1 MMOL/L
TCO2 CALC VENOUS: 17 MMOL/L
TCO2 CALC VENOUS: 22 MMOL/L
TOTAL PROTEIN: 6.5 G/DL (ref 6.4–8.2)
TROPONIN: 0.07 NG/ML
TROPONIN: 0.11 NG/ML
TROPONIN: 0.13 NG/ML
TROPONIN: 0.18 NG/ML
URINE REFLEX TO CULTURE: ABNORMAL
URINE TYPE: ABNORMAL
UROBILINOGEN, URINE: 0.2 E.U./DL
WBC # BLD: 15.2 K/UL (ref 4–11)
WBC UA: ABNORMAL /HPF (ref 0–5)

## 2019-09-08 PROCEDURE — 5A1935Z RESPIRATORY VENTILATION, LESS THAN 24 CONSECUTIVE HOURS: ICD-10-PCS | Performed by: INTERNAL MEDICINE

## 2019-09-08 PROCEDURE — 71260 CT THORAX DX C+: CPT

## 2019-09-08 PROCEDURE — 96361 HYDRATE IV INFUSION ADD-ON: CPT

## 2019-09-08 PROCEDURE — 81001 URINALYSIS AUTO W/SCOPE: CPT

## 2019-09-08 PROCEDURE — 87205 SMEAR GRAM STAIN: CPT

## 2019-09-08 PROCEDURE — 96365 THER/PROPH/DIAG IV INF INIT: CPT

## 2019-09-08 PROCEDURE — 93005 ELECTROCARDIOGRAM TRACING: CPT | Performed by: EMERGENCY MEDICINE

## 2019-09-08 PROCEDURE — 6370000000 HC RX 637 (ALT 250 FOR IP): Performed by: INTERNAL MEDICINE

## 2019-09-08 PROCEDURE — 2580000003 HC RX 258: Performed by: INTERNAL MEDICINE

## 2019-09-08 PROCEDURE — 94640 AIRWAY INHALATION TREATMENT: CPT

## 2019-09-08 PROCEDURE — 6360000002 HC RX W HCPCS

## 2019-09-08 PROCEDURE — 2700000000 HC OXYGEN THERAPY PER DAY

## 2019-09-08 PROCEDURE — 87070 CULTURE OTHR SPECIMN AEROBIC: CPT

## 2019-09-08 PROCEDURE — 83880 ASSAY OF NATRIURETIC PEPTIDE: CPT

## 2019-09-08 PROCEDURE — 94002 VENT MGMT INPAT INIT DAY: CPT

## 2019-09-08 PROCEDURE — 36415 COLL VENOUS BLD VENIPUNCTURE: CPT

## 2019-09-08 PROCEDURE — 80053 COMPREHEN METABOLIC PANEL: CPT

## 2019-09-08 PROCEDURE — 6360000002 HC RX W HCPCS: Performed by: EMERGENCY MEDICINE

## 2019-09-08 PROCEDURE — 0B9F8ZX DRAINAGE OF RIGHT LOWER LUNG LOBE, VIA NATURAL OR ARTIFICIAL OPENING ENDOSCOPIC, DIAGNOSTIC: ICD-10-PCS | Performed by: INTERNAL MEDICINE

## 2019-09-08 PROCEDURE — 84484 ASSAY OF TROPONIN QUANT: CPT

## 2019-09-08 PROCEDURE — 2000000000 HC ICU R&B

## 2019-09-08 PROCEDURE — 99291 CRITICAL CARE FIRST HOUR: CPT | Performed by: INTERNAL MEDICINE

## 2019-09-08 PROCEDURE — 82803 BLOOD GASES ANY COMBINATION: CPT

## 2019-09-08 PROCEDURE — 2500000003 HC RX 250 WO HCPCS: Performed by: EMERGENCY MEDICINE

## 2019-09-08 PROCEDURE — 74018 RADEX ABDOMEN 1 VIEW: CPT

## 2019-09-08 PROCEDURE — 6360000002 HC RX W HCPCS: Performed by: INTERNAL MEDICINE

## 2019-09-08 PROCEDURE — 31624 DX BRONCHOSCOPE/LAVAGE: CPT | Performed by: INTERNAL MEDICINE

## 2019-09-08 PROCEDURE — 2580000003 HC RX 258: Performed by: EMERGENCY MEDICINE

## 2019-09-08 PROCEDURE — 93010 ELECTROCARDIOGRAM REPORT: CPT | Performed by: INTERNAL MEDICINE

## 2019-09-08 PROCEDURE — 2580000003 HC RX 258

## 2019-09-08 PROCEDURE — 85025 COMPLETE CBC W/AUTO DIFF WBC: CPT

## 2019-09-08 PROCEDURE — 4500000026 HC ED CRITICAL CARE PROCEDURE

## 2019-09-08 PROCEDURE — 70450 CT HEAD/BRAIN W/O DYE: CPT

## 2019-09-08 PROCEDURE — 94761 N-INVAS EAR/PLS OXIMETRY MLT: CPT

## 2019-09-08 PROCEDURE — 99291 CRITICAL CARE FIRST HOUR: CPT

## 2019-09-08 PROCEDURE — 2500000003 HC RX 250 WO HCPCS: Performed by: INTERNAL MEDICINE

## 2019-09-08 PROCEDURE — 94770 HC ETCO2 MONITOR DAILY: CPT

## 2019-09-08 PROCEDURE — 31622 DX BRONCHOSCOPE/WASH: CPT

## 2019-09-08 PROCEDURE — 6360000004 HC RX CONTRAST MEDICATION: Performed by: EMERGENCY MEDICINE

## 2019-09-08 PROCEDURE — 83735 ASSAY OF MAGNESIUM: CPT

## 2019-09-08 PROCEDURE — 0BH17EZ INSERTION OF ENDOTRACHEAL AIRWAY INTO TRACHEA, VIA NATURAL OR ARTIFICIAL OPENING: ICD-10-PCS | Performed by: EMERGENCY MEDICINE

## 2019-09-08 PROCEDURE — 31500 INSERT EMERGENCY AIRWAY: CPT

## 2019-09-08 PROCEDURE — 94750 HC PULMONARY COMPLIANCE STUDY: CPT

## 2019-09-08 PROCEDURE — 71045 X-RAY EXAM CHEST 1 VIEW: CPT

## 2019-09-08 PROCEDURE — 83605 ASSAY OF LACTIC ACID: CPT

## 2019-09-08 PROCEDURE — 6370000000 HC RX 637 (ALT 250 FOR IP): Performed by: NURSE PRACTITIONER

## 2019-09-08 RX ORDER — 0.9 % SODIUM CHLORIDE 0.9 %
1000 INTRAVENOUS SOLUTION INTRAVENOUS ONCE
Status: COMPLETED | OUTPATIENT
Start: 2019-09-08 | End: 2019-09-08

## 2019-09-08 RX ORDER — CHLORHEXIDINE GLUCONATE 0.12 MG/ML
15 RINSE ORAL 2 TIMES DAILY
Status: DISCONTINUED | OUTPATIENT
Start: 2019-09-08 | End: 2019-09-09

## 2019-09-08 RX ORDER — PROPOFOL 10 MG/ML
10 INJECTION, EMULSION INTRAVENOUS
Status: DISCONTINUED | OUTPATIENT
Start: 2019-09-08 | End: 2019-09-08

## 2019-09-08 RX ORDER — FUROSEMIDE 10 MG/ML
20 INJECTION INTRAMUSCULAR; INTRAVENOUS 2 TIMES DAILY
Status: DISCONTINUED | OUTPATIENT
Start: 2019-09-08 | End: 2019-09-10

## 2019-09-08 RX ORDER — MIDAZOLAM HYDROCHLORIDE 1 MG/ML
5 INJECTION INTRAMUSCULAR; INTRAVENOUS ONCE
Status: DISCONTINUED | OUTPATIENT
Start: 2019-09-08 | End: 2019-09-09

## 2019-09-08 RX ORDER — ATORVASTATIN CALCIUM 40 MG/1
40 TABLET, FILM COATED ORAL NIGHTLY
Status: DISCONTINUED | OUTPATIENT
Start: 2019-09-08 | End: 2019-09-19

## 2019-09-08 RX ORDER — LIDOCAINE 4 G/G
1 PATCH TOPICAL DAILY
Status: DISCONTINUED | OUTPATIENT
Start: 2019-09-08 | End: 2019-09-19

## 2019-09-08 RX ORDER — ETOMIDATE 2 MG/ML
INJECTION INTRAVENOUS DAILY PRN
Status: COMPLETED | OUTPATIENT
Start: 2019-09-08 | End: 2019-09-08

## 2019-09-08 RX ORDER — FUROSEMIDE 10 MG/ML
40 INJECTION INTRAMUSCULAR; INTRAVENOUS ONCE
Status: COMPLETED | OUTPATIENT
Start: 2019-09-08 | End: 2019-09-08

## 2019-09-08 RX ORDER — IPRATROPIUM BROMIDE AND ALBUTEROL SULFATE 2.5; .5 MG/3ML; MG/3ML
1 SOLUTION RESPIRATORY (INHALATION)
Status: DISCONTINUED | OUTPATIENT
Start: 2019-09-08 | End: 2019-09-19

## 2019-09-08 RX ORDER — METOPROLOL TARTRATE 50 MG/1
50 TABLET, FILM COATED ORAL 2 TIMES DAILY
Status: DISPENSED | OUTPATIENT
Start: 2019-09-08 | End: 2019-09-18

## 2019-09-08 RX ORDER — LABETALOL HYDROCHLORIDE 5 MG/ML
10 INJECTION, SOLUTION INTRAVENOUS ONCE
Status: DISCONTINUED | OUTPATIENT
Start: 2019-09-08 | End: 2019-09-19

## 2019-09-08 RX ORDER — FENTANYL CITRATE 50 UG/ML
25 INJECTION, SOLUTION INTRAMUSCULAR; INTRAVENOUS
Status: DISCONTINUED | OUTPATIENT
Start: 2019-09-08 | End: 2019-09-09

## 2019-09-08 RX ORDER — ETOMIDATE 2 MG/ML
20 INJECTION INTRAVENOUS ONCE
Status: DISCONTINUED | OUTPATIENT
Start: 2019-09-08 | End: 2019-09-09

## 2019-09-08 RX ORDER — PROPOFOL 10 MG/ML
INJECTION, EMULSION INTRAVENOUS
Status: DISPENSED
Start: 2019-09-08 | End: 2019-09-08

## 2019-09-08 RX ORDER — ONDANSETRON 2 MG/ML
4 INJECTION INTRAMUSCULAR; INTRAVENOUS EVERY 6 HOURS PRN
Status: DISCONTINUED | OUTPATIENT
Start: 2019-09-08 | End: 2019-09-19

## 2019-09-08 RX ORDER — ROCURONIUM BROMIDE 10 MG/ML
INJECTION, SOLUTION INTRAVENOUS DAILY PRN
Status: COMPLETED | OUTPATIENT
Start: 2019-09-08 | End: 2019-09-08

## 2019-09-08 RX ORDER — IPRATROPIUM BROMIDE AND ALBUTEROL SULFATE 2.5; .5 MG/3ML; MG/3ML
1 SOLUTION RESPIRATORY (INHALATION) EVERY 4 HOURS
Status: DISCONTINUED | OUTPATIENT
Start: 2019-09-08 | End: 2019-09-08

## 2019-09-08 RX ORDER — SODIUM CHLORIDE 0.9 % (FLUSH) 0.9 %
10 SYRINGE (ML) INJECTION PRN
Status: DISCONTINUED | OUTPATIENT
Start: 2019-09-08 | End: 2019-09-19

## 2019-09-08 RX ORDER — LIDOCAINE HYDROCHLORIDE 10 MG/ML
6 INJECTION, SOLUTION EPIDURAL; INFILTRATION; INTRACAUDAL; PERINEURAL ONCE
Status: COMPLETED | OUTPATIENT
Start: 2019-09-08 | End: 2019-09-08

## 2019-09-08 RX ORDER — SODIUM CHLORIDE 9 MG/ML
INJECTION, SOLUTION INTRAVENOUS
Status: COMPLETED
Start: 2019-09-08 | End: 2019-09-08

## 2019-09-08 RX ORDER — CLOPIDOGREL BISULFATE 75 MG/1
75 TABLET ORAL DAILY
Status: DISCONTINUED | OUTPATIENT
Start: 2019-09-08 | End: 2019-09-18

## 2019-09-08 RX ORDER — PROPOFOL 10 MG/ML
10 INJECTION, EMULSION INTRAVENOUS ONCE
Status: COMPLETED | OUTPATIENT
Start: 2019-09-08 | End: 2019-09-08

## 2019-09-08 RX ORDER — ACETAMINOPHEN 650 MG/1
650 SUPPOSITORY RECTAL ONCE
Status: COMPLETED | OUTPATIENT
Start: 2019-09-08 | End: 2019-09-08

## 2019-09-08 RX ORDER — ROCURONIUM BROMIDE 10 MG/ML
60 INJECTION, SOLUTION INTRAVENOUS ONCE
Status: DISCONTINUED | OUTPATIENT
Start: 2019-09-08 | End: 2019-09-09

## 2019-09-08 RX ORDER — SODIUM CHLORIDE 0.9 % (FLUSH) 0.9 %
10 SYRINGE (ML) INJECTION EVERY 12 HOURS SCHEDULED
Status: DISCONTINUED | OUTPATIENT
Start: 2019-09-08 | End: 2019-09-19

## 2019-09-08 RX ORDER — ACETAMINOPHEN 325 MG/1
650 TABLET ORAL EVERY 4 HOURS PRN
Status: DISCONTINUED | OUTPATIENT
Start: 2019-09-08 | End: 2019-09-19

## 2019-09-08 RX ORDER — MIDAZOLAM HYDROCHLORIDE 5 MG/ML
INJECTION INTRAMUSCULAR; INTRAVENOUS
Status: COMPLETED
Start: 2019-09-08 | End: 2019-09-08

## 2019-09-08 RX ORDER — ALBUTEROL SULFATE 90 UG/1
2 AEROSOL, METERED RESPIRATORY (INHALATION) EVERY 6 HOURS PRN
Status: DISCONTINUED | OUTPATIENT
Start: 2019-09-08 | End: 2019-09-19

## 2019-09-08 RX ADMIN — MIDAZOLAM HYDROCHLORIDE 5 MG: 5 INJECTION, SOLUTION INTRAMUSCULAR; INTRAVENOUS at 04:08

## 2019-09-08 RX ADMIN — IPRATROPIUM BROMIDE AND ALBUTEROL SULFATE 1 AMPULE: .5; 3 SOLUTION RESPIRATORY (INHALATION) at 23:37

## 2019-09-08 RX ADMIN — ROCURONIUM BROMIDE 60 MG: 10 SOLUTION INTRAVENOUS at 02:41

## 2019-09-08 RX ADMIN — IOPAMIDOL 75 ML: 755 INJECTION, SOLUTION INTRAVENOUS at 05:00

## 2019-09-08 RX ADMIN — METOPROLOL TARTRATE 50 MG: 50 TABLET ORAL at 20:37

## 2019-09-08 RX ADMIN — ETOMIDATE 20 MG: 2 INJECTION INTRAVENOUS at 02:39

## 2019-09-08 RX ADMIN — FUROSEMIDE 20 MG: 10 INJECTION, SOLUTION INTRAMUSCULAR; INTRAVENOUS at 18:22

## 2019-09-08 RX ADMIN — FUROSEMIDE 40 MG: 10 INJECTION, SOLUTION INTRAMUSCULAR; INTRAVENOUS at 08:44

## 2019-09-08 RX ADMIN — IPRATROPIUM BROMIDE AND ALBUTEROL SULFATE 1 AMPULE: .5; 3 SOLUTION RESPIRATORY (INHALATION) at 20:17

## 2019-09-08 RX ADMIN — LIDOCAINE HYDROCHLORIDE 6 ML: 10 INJECTION, SOLUTION EPIDURAL; INFILTRATION; INTRACAUDAL; PERINEURAL at 10:14

## 2019-09-08 RX ADMIN — Medication 10 ML: at 20:38

## 2019-09-08 RX ADMIN — IPRATROPIUM BROMIDE AND ALBUTEROL SULFATE 1 AMPULE: .5; 3 SOLUTION RESPIRATORY (INHALATION) at 15:20

## 2019-09-08 RX ADMIN — ACETAMINOPHEN 650 MG: 325 TABLET ORAL at 17:46

## 2019-09-08 RX ADMIN — ACETAMINOPHEN 650 MG: 650 SUPPOSITORY RECTAL at 03:48

## 2019-09-08 RX ADMIN — PROPOFOL 10 MCG/KG/MIN: 10 INJECTION, EMULSION INTRAVENOUS at 08:45

## 2019-09-08 RX ADMIN — SODIUM CHLORIDE 1000 ML: 9 INJECTION, SOLUTION INTRAVENOUS at 03:07

## 2019-09-08 RX ADMIN — PROPOFOL 10 MCG/KG/MIN: 10 INJECTION, EMULSION INTRAVENOUS at 03:03

## 2019-09-08 RX ADMIN — SODIUM CHLORIDE: 9 INJECTION, SOLUTION INTRAVENOUS at 04:30

## 2019-09-08 RX ADMIN — ATORVASTATIN CALCIUM 40 MG: 40 TABLET, FILM COATED ORAL at 20:36

## 2019-09-08 RX ADMIN — FENTANYL CITRATE 25 MCG/HR: 50 INJECTION, SOLUTION INTRAMUSCULAR; INTRAVENOUS at 04:37

## 2019-09-08 ASSESSMENT — PAIN SCALES - GENERAL
PAINLEVEL_OUTOF10: 8
PAINLEVEL_OUTOF10: 2
PAINLEVEL_OUTOF10: 0

## 2019-09-08 ASSESSMENT — PULMONARY FUNCTION TESTS
PIF_VALUE: 18
PIF_VALUE: 31
PIF_VALUE: 15
PIF_VALUE: 15
PIF_VALUE: 18
PIF_VALUE: 15
PIF_VALUE: 10

## 2019-09-09 ENCOUNTER — APPOINTMENT (OUTPATIENT)
Dept: GENERAL RADIOLOGY | Age: 79
DRG: 235 | End: 2019-09-09
Payer: MEDICARE

## 2019-09-09 LAB
ANION GAP SERPL CALCULATED.3IONS-SCNC: 7 MMOL/L (ref 3–16)
BASOPHILS ABSOLUTE: 0.1 K/UL (ref 0–0.2)
BASOPHILS RELATIVE PERCENT: 0.8 %
BUN BLDV-MCNC: 18 MG/DL (ref 7–20)
CALCIUM SERPL-MCNC: 8.4 MG/DL (ref 8.3–10.6)
CHLORIDE BLD-SCNC: 107 MMOL/L (ref 99–110)
CO2: 27 MMOL/L (ref 21–32)
CREAT SERPL-MCNC: 0.9 MG/DL (ref 0.6–1.2)
EOSINOPHILS ABSOLUTE: 0.2 K/UL (ref 0–0.6)
EOSINOPHILS RELATIVE PERCENT: 1.9 %
GFR AFRICAN AMERICAN: >60
GFR NON-AFRICAN AMERICAN: >60
GLUCOSE BLD-MCNC: 104 MG/DL (ref 70–99)
HCT VFR BLD CALC: 35.1 % (ref 36–48)
HEMOGLOBIN: 11.9 G/DL (ref 12–16)
LYMPHOCYTES ABSOLUTE: 1.2 K/UL (ref 1–5.1)
LYMPHOCYTES RELATIVE PERCENT: 14.1 %
MCH RBC QN AUTO: 32 PG (ref 26–34)
MCHC RBC AUTO-ENTMCNC: 33.8 G/DL (ref 31–36)
MCV RBC AUTO: 94.9 FL (ref 80–100)
MONOCYTES ABSOLUTE: 0.6 K/UL (ref 0–1.3)
MONOCYTES RELATIVE PERCENT: 7 %
NEUTROPHILS ABSOLUTE: 6.5 K/UL (ref 1.7–7.7)
NEUTROPHILS RELATIVE PERCENT: 76.2 %
PDW BLD-RTO: 14.3 % (ref 12.4–15.4)
PLATELET # BLD: 125 K/UL (ref 135–450)
PMV BLD AUTO: 9.2 FL (ref 5–10.5)
POTASSIUM SERPL-SCNC: 3.1 MMOL/L (ref 3.5–5.1)
PROCALCITONIN: 4.73 NG/ML (ref 0–0.15)
RBC # BLD: 3.7 M/UL (ref 4–5.2)
SODIUM BLD-SCNC: 141 MMOL/L (ref 136–145)
WBC # BLD: 8.5 K/UL (ref 4–11)

## 2019-09-09 PROCEDURE — 71045 X-RAY EXAM CHEST 1 VIEW: CPT

## 2019-09-09 PROCEDURE — 6370000000 HC RX 637 (ALT 250 FOR IP): Performed by: INTERNAL MEDICINE

## 2019-09-09 PROCEDURE — 2060000000 HC ICU INTERMEDIATE R&B

## 2019-09-09 PROCEDURE — 2700000000 HC OXYGEN THERAPY PER DAY

## 2019-09-09 PROCEDURE — 99233 SBSQ HOSP IP/OBS HIGH 50: CPT | Performed by: INTERNAL MEDICINE

## 2019-09-09 PROCEDURE — 6360000002 HC RX W HCPCS: Performed by: INTERNAL MEDICINE

## 2019-09-09 PROCEDURE — 94761 N-INVAS EAR/PLS OXIMETRY MLT: CPT

## 2019-09-09 PROCEDURE — 94660 CPAP INITIATION&MGMT: CPT

## 2019-09-09 PROCEDURE — 94640 AIRWAY INHALATION TREATMENT: CPT

## 2019-09-09 PROCEDURE — 85025 COMPLETE CBC W/AUTO DIFF WBC: CPT

## 2019-09-09 PROCEDURE — 80048 BASIC METABOLIC PNL TOTAL CA: CPT

## 2019-09-09 PROCEDURE — 2580000003 HC RX 258: Performed by: INTERNAL MEDICINE

## 2019-09-09 PROCEDURE — 6370000000 HC RX 637 (ALT 250 FOR IP): Performed by: NURSE PRACTITIONER

## 2019-09-09 PROCEDURE — 36415 COLL VENOUS BLD VENIPUNCTURE: CPT

## 2019-09-09 PROCEDURE — 84145 PROCALCITONIN (PCT): CPT

## 2019-09-09 RX ORDER — HYDROXYZINE PAMOATE 25 MG/1
50 CAPSULE ORAL ONCE
Status: COMPLETED | OUTPATIENT
Start: 2019-09-09 | End: 2019-09-09

## 2019-09-09 RX ORDER — HYDROXYZINE HYDROCHLORIDE 25 MG/1
50 TABLET, FILM COATED ORAL ONCE
Status: DISCONTINUED | OUTPATIENT
Start: 2019-09-09 | End: 2019-09-09 | Stop reason: SDUPTHER

## 2019-09-09 RX ORDER — LOSARTAN POTASSIUM 25 MG/1
25 TABLET ORAL DAILY
Status: ON HOLD | COMMUNITY
End: 2021-06-04 | Stop reason: HOSPADM

## 2019-09-09 RX ORDER — PREDNISONE 1 MG/1
5 TABLET ORAL DAILY
Status: DISCONTINUED | OUTPATIENT
Start: 2019-09-09 | End: 2019-09-12

## 2019-09-09 RX ORDER — BUDESONIDE 0.5 MG/2ML
0.5 INHALANT ORAL 2 TIMES DAILY
Status: DISCONTINUED | OUTPATIENT
Start: 2019-09-09 | End: 2019-09-24 | Stop reason: HOSPADM

## 2019-09-09 RX ORDER — LOSARTAN POTASSIUM 25 MG/1
50 TABLET ORAL DAILY
Status: DISCONTINUED | OUTPATIENT
Start: 2019-09-09 | End: 2019-09-19

## 2019-09-09 RX ORDER — PREDNISONE 1 MG/1
5 TABLET ORAL DAILY
Status: ON HOLD | COMMUNITY
End: 2019-09-24 | Stop reason: HOSPADM

## 2019-09-09 RX ADMIN — ATORVASTATIN CALCIUM 40 MG: 40 TABLET, FILM COATED ORAL at 20:24

## 2019-09-09 RX ADMIN — IPRATROPIUM BROMIDE AND ALBUTEROL SULFATE 1 AMPULE: .5; 3 SOLUTION RESPIRATORY (INHALATION) at 16:11

## 2019-09-09 RX ADMIN — Medication 10 ML: at 20:24

## 2019-09-09 RX ADMIN — HYDROXYZINE PAMOATE 50 MG: 25 CAPSULE ORAL at 20:45

## 2019-09-09 RX ADMIN — BUDESONIDE 500 MCG: 0.5 SUSPENSION RESPIRATORY (INHALATION) at 12:20

## 2019-09-09 RX ADMIN — ASPIRIN 325 MG: 325 TABLET, DELAYED RELEASE ORAL at 08:13

## 2019-09-09 RX ADMIN — FUROSEMIDE 20 MG: 10 INJECTION, SOLUTION INTRAMUSCULAR; INTRAVENOUS at 17:40

## 2019-09-09 RX ADMIN — METOPROLOL TARTRATE 50 MG: 50 TABLET ORAL at 08:13

## 2019-09-09 RX ADMIN — LOSARTAN POTASSIUM 50 MG: 25 TABLET, FILM COATED ORAL at 14:06

## 2019-09-09 RX ADMIN — MUPIROCIN: 20 OINTMENT TOPICAL at 08:11

## 2019-09-09 RX ADMIN — ACETAMINOPHEN 650 MG: 325 TABLET ORAL at 03:13

## 2019-09-09 RX ADMIN — METOPROLOL TARTRATE 50 MG: 50 TABLET ORAL at 20:24

## 2019-09-09 RX ADMIN — CLOPIDOGREL BISULFATE 75 MG: 75 TABLET ORAL at 08:13

## 2019-09-09 RX ADMIN — BUDESONIDE 500 MCG: 0.5 SUSPENSION RESPIRATORY (INHALATION) at 19:27

## 2019-09-09 RX ADMIN — IPRATROPIUM BROMIDE AND ALBUTEROL SULFATE 1 AMPULE: .5; 3 SOLUTION RESPIRATORY (INHALATION) at 12:22

## 2019-09-09 RX ADMIN — POTASSIUM BICARBONATE 40 MEQ: 782 TABLET, EFFERVESCENT ORAL at 10:41

## 2019-09-09 RX ADMIN — Medication 10 ML: at 08:14

## 2019-09-09 RX ADMIN — FUROSEMIDE 20 MG: 10 INJECTION, SOLUTION INTRAMUSCULAR; INTRAVENOUS at 08:13

## 2019-09-09 RX ADMIN — IPRATROPIUM BROMIDE AND ALBUTEROL SULFATE 1 AMPULE: .5; 3 SOLUTION RESPIRATORY (INHALATION) at 19:27

## 2019-09-09 RX ADMIN — ENOXAPARIN SODIUM 40 MG: 40 INJECTION SUBCUTANEOUS at 08:30

## 2019-09-09 RX ADMIN — IPRATROPIUM BROMIDE AND ALBUTEROL SULFATE 1 AMPULE: .5; 3 SOLUTION RESPIRATORY (INHALATION) at 08:05

## 2019-09-09 RX ADMIN — PREDNISONE 5 MG: 5 TABLET ORAL at 10:41

## 2019-09-09 ASSESSMENT — PAIN SCALES - GENERAL
PAINLEVEL_OUTOF10: 0
PAINLEVEL_OUTOF10: 0
PAINLEVEL_OUTOF10: 5
PAINLEVEL_OUTOF10: 0

## 2019-09-09 ASSESSMENT — PAIN DESCRIPTION - PAIN TYPE: TYPE: CHRONIC PAIN

## 2019-09-09 ASSESSMENT — PAIN DESCRIPTION - LOCATION: LOCATION: HEAD

## 2019-09-10 ENCOUNTER — APPOINTMENT (OUTPATIENT)
Dept: GENERAL RADIOLOGY | Age: 79
DRG: 235 | End: 2019-09-10
Payer: MEDICARE

## 2019-09-10 LAB
ANION GAP SERPL CALCULATED.3IONS-SCNC: 9 MMOL/L (ref 3–16)
BUN BLDV-MCNC: 25 MG/DL (ref 7–20)
CALCIUM SERPL-MCNC: 8.9 MG/DL (ref 8.3–10.6)
CHLORIDE BLD-SCNC: 107 MMOL/L (ref 99–110)
CO2: 29 MMOL/L (ref 21–32)
CREAT SERPL-MCNC: 1.3 MG/DL (ref 0.6–1.2)
CULTURE, RESPIRATORY: NORMAL
GFR AFRICAN AMERICAN: 48
GFR NON-AFRICAN AMERICAN: 40
GLUCOSE BLD-MCNC: 103 MG/DL (ref 70–99)
GRAM STAIN RESULT: NORMAL
POTASSIUM SERPL-SCNC: 3.8 MMOL/L (ref 3.5–5.1)
PRO-BNP: 2727 PG/ML (ref 0–449)
SODIUM BLD-SCNC: 145 MMOL/L (ref 136–145)

## 2019-09-10 PROCEDURE — 94669 MECHANICAL CHEST WALL OSCILL: CPT

## 2019-09-10 PROCEDURE — 6370000000 HC RX 637 (ALT 250 FOR IP): Performed by: INTERNAL MEDICINE

## 2019-09-10 PROCEDURE — 2580000003 HC RX 258: Performed by: INTERNAL MEDICINE

## 2019-09-10 PROCEDURE — 36415 COLL VENOUS BLD VENIPUNCTURE: CPT

## 2019-09-10 PROCEDURE — 94640 AIRWAY INHALATION TREATMENT: CPT

## 2019-09-10 PROCEDURE — 80048 BASIC METABOLIC PNL TOTAL CA: CPT

## 2019-09-10 PROCEDURE — 97116 GAIT TRAINING THERAPY: CPT

## 2019-09-10 PROCEDURE — 2700000000 HC OXYGEN THERAPY PER DAY

## 2019-09-10 PROCEDURE — 2060000000 HC ICU INTERMEDIATE R&B

## 2019-09-10 PROCEDURE — 97161 PT EVAL LOW COMPLEX 20 MIN: CPT

## 2019-09-10 PROCEDURE — 83880 ASSAY OF NATRIURETIC PEPTIDE: CPT

## 2019-09-10 PROCEDURE — 99233 SBSQ HOSP IP/OBS HIGH 50: CPT | Performed by: INTERNAL MEDICINE

## 2019-09-10 PROCEDURE — 71045 X-RAY EXAM CHEST 1 VIEW: CPT

## 2019-09-10 PROCEDURE — 6360000002 HC RX W HCPCS: Performed by: INTERNAL MEDICINE

## 2019-09-10 PROCEDURE — 97530 THERAPEUTIC ACTIVITIES: CPT

## 2019-09-10 PROCEDURE — 97165 OT EVAL LOW COMPLEX 30 MIN: CPT

## 2019-09-10 PROCEDURE — 94761 N-INVAS EAR/PLS OXIMETRY MLT: CPT

## 2019-09-10 RX ADMIN — Medication 10 ML: at 21:34

## 2019-09-10 RX ADMIN — PREDNISONE 5 MG: 5 TABLET ORAL at 08:07

## 2019-09-10 RX ADMIN — BUDESONIDE 500 MCG: 0.5 SUSPENSION RESPIRATORY (INHALATION) at 09:41

## 2019-09-10 RX ADMIN — ENOXAPARIN SODIUM 40 MG: 40 INJECTION SUBCUTANEOUS at 08:07

## 2019-09-10 RX ADMIN — METOPROLOL TARTRATE 50 MG: 50 TABLET ORAL at 21:34

## 2019-09-10 RX ADMIN — ASPIRIN 325 MG: 325 TABLET, DELAYED RELEASE ORAL at 08:07

## 2019-09-10 RX ADMIN — BUDESONIDE 500 MCG: 0.5 SUSPENSION RESPIRATORY (INHALATION) at 19:57

## 2019-09-10 RX ADMIN — ATORVASTATIN CALCIUM 40 MG: 40 TABLET, FILM COATED ORAL at 21:33

## 2019-09-10 RX ADMIN — Medication 10 ML: at 08:10

## 2019-09-10 RX ADMIN — ACETAMINOPHEN 650 MG: 325 TABLET ORAL at 22:07

## 2019-09-10 RX ADMIN — IPRATROPIUM BROMIDE AND ALBUTEROL SULFATE 1 AMPULE: .5; 3 SOLUTION RESPIRATORY (INHALATION) at 15:47

## 2019-09-10 RX ADMIN — FUROSEMIDE 20 MG: 10 INJECTION, SOLUTION INTRAMUSCULAR; INTRAVENOUS at 08:07

## 2019-09-10 RX ADMIN — IPRATROPIUM BROMIDE AND ALBUTEROL SULFATE 1 AMPULE: .5; 3 SOLUTION RESPIRATORY (INHALATION) at 19:57

## 2019-09-10 RX ADMIN — METOPROLOL TARTRATE 50 MG: 50 TABLET ORAL at 08:07

## 2019-09-10 RX ADMIN — LOSARTAN POTASSIUM 50 MG: 25 TABLET, FILM COATED ORAL at 08:07

## 2019-09-10 RX ADMIN — IPRATROPIUM BROMIDE AND ALBUTEROL SULFATE 1 AMPULE: .5; 3 SOLUTION RESPIRATORY (INHALATION) at 11:20

## 2019-09-10 ASSESSMENT — PAIN SCALES - GENERAL
PAINLEVEL_OUTOF10: 0
PAINLEVEL_OUTOF10: 5

## 2019-09-11 PROBLEM — E44.1 MILD PROTEIN-CALORIE MALNUTRITION (HCC): Status: ACTIVE | Noted: 2019-03-19

## 2019-09-11 LAB
ALBUMIN SERPL-MCNC: 3.1 G/DL (ref 3.4–5)
ANION GAP SERPL CALCULATED.3IONS-SCNC: 8 MMOL/L (ref 3–16)
BUN BLDV-MCNC: 24 MG/DL (ref 7–20)
CALCIUM SERPL-MCNC: 8.9 MG/DL (ref 8.3–10.6)
CHLORIDE BLD-SCNC: 105 MMOL/L (ref 99–110)
CO2: 31 MMOL/L (ref 21–32)
CREAT SERPL-MCNC: 1.1 MG/DL (ref 0.6–1.2)
GFR AFRICAN AMERICAN: 58
GFR NON-AFRICAN AMERICAN: 48
GLUCOSE BLD-MCNC: 118 MG/DL (ref 70–99)
PHOSPHORUS: 2.9 MG/DL (ref 2.5–4.9)
POTASSIUM SERPL-SCNC: 3.8 MMOL/L (ref 3.5–5.1)
SODIUM BLD-SCNC: 144 MMOL/L (ref 136–145)

## 2019-09-11 PROCEDURE — 6360000002 HC RX W HCPCS: Performed by: INTERNAL MEDICINE

## 2019-09-11 PROCEDURE — 97116 GAIT TRAINING THERAPY: CPT

## 2019-09-11 PROCEDURE — 2060000000 HC ICU INTERMEDIATE R&B

## 2019-09-11 PROCEDURE — 2580000003 HC RX 258: Performed by: INTERNAL MEDICINE

## 2019-09-11 PROCEDURE — 99024 POSTOP FOLLOW-UP VISIT: CPT | Performed by: THORACIC SURGERY (CARDIOTHORACIC VASCULAR SURGERY)

## 2019-09-11 PROCEDURE — 6370000000 HC RX 637 (ALT 250 FOR IP): Performed by: INTERNAL MEDICINE

## 2019-09-11 PROCEDURE — 2700000000 HC OXYGEN THERAPY PER DAY

## 2019-09-11 PROCEDURE — 94669 MECHANICAL CHEST WALL OSCILL: CPT

## 2019-09-11 PROCEDURE — 99232 SBSQ HOSP IP/OBS MODERATE 35: CPT | Performed by: INTERNAL MEDICINE

## 2019-09-11 PROCEDURE — 80069 RENAL FUNCTION PANEL: CPT

## 2019-09-11 PROCEDURE — 94660 CPAP INITIATION&MGMT: CPT

## 2019-09-11 PROCEDURE — 94150 VITAL CAPACITY TEST: CPT

## 2019-09-11 PROCEDURE — 94640 AIRWAY INHALATION TREATMENT: CPT

## 2019-09-11 PROCEDURE — 36415 COLL VENOUS BLD VENIPUNCTURE: CPT

## 2019-09-11 PROCEDURE — 99233 SBSQ HOSP IP/OBS HIGH 50: CPT | Performed by: INTERNAL MEDICINE

## 2019-09-11 PROCEDURE — 97530 THERAPEUTIC ACTIVITIES: CPT

## 2019-09-11 PROCEDURE — 97110 THERAPEUTIC EXERCISES: CPT

## 2019-09-11 PROCEDURE — 94761 N-INVAS EAR/PLS OXIMETRY MLT: CPT

## 2019-09-11 RX ORDER — HYDROXYZINE HYDROCHLORIDE 10 MG/1
10 TABLET, FILM COATED ORAL 3 TIMES DAILY PRN
Status: DISCONTINUED | OUTPATIENT
Start: 2019-09-11 | End: 2019-09-19

## 2019-09-11 RX ORDER — FUROSEMIDE 10 MG/ML
20 INJECTION INTRAMUSCULAR; INTRAVENOUS 2 TIMES DAILY
Status: COMPLETED | OUTPATIENT
Start: 2019-09-11 | End: 2019-09-11

## 2019-09-11 RX ADMIN — ASPIRIN 325 MG: 325 TABLET, DELAYED RELEASE ORAL at 08:55

## 2019-09-11 RX ADMIN — HYDROXYZINE HYDROCHLORIDE 10 MG: 10 TABLET, FILM COATED ORAL at 13:14

## 2019-09-11 RX ADMIN — FUROSEMIDE 20 MG: 10 INJECTION, SOLUTION INTRAMUSCULAR; INTRAVENOUS at 20:58

## 2019-09-11 RX ADMIN — Medication 10 ML: at 13:15

## 2019-09-11 RX ADMIN — LOSARTAN POTASSIUM 50 MG: 25 TABLET, FILM COATED ORAL at 08:55

## 2019-09-11 RX ADMIN — ONDANSETRON 4 MG: 2 INJECTION INTRAMUSCULAR; INTRAVENOUS at 01:10

## 2019-09-11 RX ADMIN — IPRATROPIUM BROMIDE AND ALBUTEROL SULFATE 1 AMPULE: .5; 3 SOLUTION RESPIRATORY (INHALATION) at 07:01

## 2019-09-11 RX ADMIN — ATORVASTATIN CALCIUM 40 MG: 40 TABLET, FILM COATED ORAL at 20:55

## 2019-09-11 RX ADMIN — METOPROLOL TARTRATE 50 MG: 50 TABLET ORAL at 08:55

## 2019-09-11 RX ADMIN — ENOXAPARIN SODIUM 40 MG: 40 INJECTION SUBCUTANEOUS at 08:55

## 2019-09-11 RX ADMIN — PREDNISONE 5 MG: 5 TABLET ORAL at 07:43

## 2019-09-11 RX ADMIN — METOPROLOL TARTRATE 50 MG: 50 TABLET ORAL at 20:55

## 2019-09-11 RX ADMIN — IPRATROPIUM BROMIDE AND ALBUTEROL SULFATE 1 AMPULE: .5; 3 SOLUTION RESPIRATORY (INHALATION) at 15:14

## 2019-09-11 RX ADMIN — Medication 10 ML: at 20:56

## 2019-09-11 RX ADMIN — IPRATROPIUM BROMIDE AND ALBUTEROL SULFATE 1 AMPULE: .5; 3 SOLUTION RESPIRATORY (INHALATION) at 11:14

## 2019-09-11 RX ADMIN — BUDESONIDE 500 MCG: 0.5 SUSPENSION RESPIRATORY (INHALATION) at 07:02

## 2019-09-11 RX ADMIN — IPRATROPIUM BROMIDE AND ALBUTEROL SULFATE 1 AMPULE: .5; 3 SOLUTION RESPIRATORY (INHALATION) at 19:07

## 2019-09-11 RX ADMIN — FUROSEMIDE 20 MG: 10 INJECTION, SOLUTION INTRAMUSCULAR; INTRAVENOUS at 14:38

## 2019-09-11 RX ADMIN — BUDESONIDE 500 MCG: 0.5 SUSPENSION RESPIRATORY (INHALATION) at 19:07

## 2019-09-11 RX ADMIN — Medication 10 ML: at 01:10

## 2019-09-11 ASSESSMENT — PAIN SCALES - GENERAL
PAINLEVEL_OUTOF10: 0
PAINLEVEL_OUTOF10: 0

## 2019-09-12 ENCOUNTER — APPOINTMENT (OUTPATIENT)
Dept: GENERAL RADIOLOGY | Age: 79
DRG: 235 | End: 2019-09-12
Payer: MEDICARE

## 2019-09-12 LAB
BASE EXCESS ARTERIAL: 5.4 MMOL/L (ref -3–3)
BILIRUBIN URINE: NEGATIVE
BLOOD, URINE: ABNORMAL
CARBOXYHEMOGLOBIN ARTERIAL: 0.5 % (ref 0–1.5)
CLARITY: CLEAR
COLOR: YELLOW
EPITHELIAL CELLS, UA: ABNORMAL /HPF
GLUCOSE URINE: NEGATIVE MG/DL
HCO3 ARTERIAL: 30.7 MMOL/L (ref 21–29)
HCT VFR BLD CALC: 44.7 % (ref 36–48)
HEMOGLOBIN, ART, EXTENDED: 13.7 G/DL (ref 12–16)
HEMOGLOBIN: 14.8 G/DL (ref 12–16)
KETONES, URINE: NEGATIVE MG/DL
LEUKOCYTE ESTERASE, URINE: ABNORMAL
MCH RBC QN AUTO: 31.9 PG (ref 26–34)
MCHC RBC AUTO-ENTMCNC: 33.1 G/DL (ref 31–36)
MCV RBC AUTO: 96.4 FL (ref 80–100)
METHEMOGLOBIN ARTERIAL: 0.6 %
MICROSCOPIC EXAMINATION: YES
NITRITE, URINE: NEGATIVE
O2 CONTENT ARTERIAL: 18 ML/DL
O2 SAT, ARTERIAL: 96.4 %
O2 THERAPY: ABNORMAL
PCO2 ARTERIAL: 47.5 MMHG (ref 35–45)
PDW BLD-RTO: 14.4 % (ref 12.4–15.4)
PH ARTERIAL: 7.43 (ref 7.35–7.45)
PH UA: 7 (ref 5–8)
PLATELET # BLD: 191 K/UL (ref 135–450)
PMV BLD AUTO: 9 FL (ref 5–10.5)
PO2 ARTERIAL: 84.9 MMHG (ref 75–108)
PROTEIN UA: 30 MG/DL
RBC # BLD: 4.64 M/UL (ref 4–5.2)
RBC UA: ABNORMAL /HPF (ref 0–2)
SPECIFIC GRAVITY UA: 1.01 (ref 1–1.03)
TCO2 ARTERIAL: 32.2 MMOL/L
URINE REFLEX TO CULTURE: YES
URINE TYPE: ABNORMAL
UROBILINOGEN, URINE: 0.2 E.U./DL
WBC # BLD: 8.7 K/UL (ref 4–11)
WBC UA: ABNORMAL /HPF (ref 0–5)

## 2019-09-12 PROCEDURE — 94669 MECHANICAL CHEST WALL OSCILL: CPT

## 2019-09-12 PROCEDURE — 2700000000 HC OXYGEN THERAPY PER DAY

## 2019-09-12 PROCEDURE — 87186 SC STD MICRODIL/AGAR DIL: CPT

## 2019-09-12 PROCEDURE — 94761 N-INVAS EAR/PLS OXIMETRY MLT: CPT

## 2019-09-12 PROCEDURE — 6370000000 HC RX 637 (ALT 250 FOR IP): Performed by: INTERNAL MEDICINE

## 2019-09-12 PROCEDURE — 99233 SBSQ HOSP IP/OBS HIGH 50: CPT | Performed by: INTERNAL MEDICINE

## 2019-09-12 PROCEDURE — 2060000000 HC ICU INTERMEDIATE R&B

## 2019-09-12 PROCEDURE — 94640 AIRWAY INHALATION TREATMENT: CPT

## 2019-09-12 PROCEDURE — 71046 X-RAY EXAM CHEST 2 VIEWS: CPT

## 2019-09-12 PROCEDURE — 85027 COMPLETE CBC AUTOMATED: CPT

## 2019-09-12 PROCEDURE — 93971 EXTREMITY STUDY: CPT

## 2019-09-12 PROCEDURE — 87086 URINE CULTURE/COLONY COUNT: CPT

## 2019-09-12 PROCEDURE — 93880 EXTRACRANIAL BILAT STUDY: CPT

## 2019-09-12 PROCEDURE — 82803 BLOOD GASES ANY COMBINATION: CPT

## 2019-09-12 PROCEDURE — 2580000003 HC RX 258: Performed by: INTERNAL MEDICINE

## 2019-09-12 PROCEDURE — 6360000002 HC RX W HCPCS: Performed by: INTERNAL MEDICINE

## 2019-09-12 PROCEDURE — 36600 WITHDRAWAL OF ARTERIAL BLOOD: CPT

## 2019-09-12 PROCEDURE — 36415 COLL VENOUS BLD VENIPUNCTURE: CPT

## 2019-09-12 PROCEDURE — 6370000000 HC RX 637 (ALT 250 FOR IP): Performed by: THORACIC SURGERY (CARDIOTHORACIC VASCULAR SURGERY)

## 2019-09-12 PROCEDURE — 94762 N-INVAS EAR/PLS OXIMTRY CONT: CPT

## 2019-09-12 PROCEDURE — 81001 URINALYSIS AUTO W/SCOPE: CPT

## 2019-09-12 RX ORDER — ASPIRIN 81 MG/1
81 TABLET ORAL DAILY
Status: COMPLETED | OUTPATIENT
Start: 2019-09-13 | End: 2019-09-18

## 2019-09-12 RX ORDER — FUROSEMIDE 40 MG/1
40 TABLET ORAL DAILY
Status: DISCONTINUED | OUTPATIENT
Start: 2019-09-12 | End: 2019-09-15

## 2019-09-12 RX ORDER — LORAZEPAM 0.5 MG/1
0.25 TABLET ORAL ONCE
Status: COMPLETED | OUTPATIENT
Start: 2019-09-12 | End: 2019-09-12

## 2019-09-12 RX ORDER — LORAZEPAM 0.5 MG/1
0.5 TABLET ORAL ONCE
Status: COMPLETED | OUTPATIENT
Start: 2019-09-12 | End: 2019-09-13

## 2019-09-12 RX ORDER — PREDNISONE 20 MG/1
20 TABLET ORAL DAILY
Status: DISCONTINUED | OUTPATIENT
Start: 2019-09-13 | End: 2019-09-16

## 2019-09-12 RX ADMIN — IPRATROPIUM BROMIDE AND ALBUTEROL SULFATE 1 AMPULE: .5; 3 SOLUTION RESPIRATORY (INHALATION) at 16:21

## 2019-09-12 RX ADMIN — ASPIRIN 325 MG: 325 TABLET, DELAYED RELEASE ORAL at 08:44

## 2019-09-12 RX ADMIN — IPRATROPIUM BROMIDE AND ALBUTEROL SULFATE 1 AMPULE: .5; 3 SOLUTION RESPIRATORY (INHALATION) at 19:15

## 2019-09-12 RX ADMIN — IPRATROPIUM BROMIDE AND ALBUTEROL SULFATE 1 AMPULE: .5; 3 SOLUTION RESPIRATORY (INHALATION) at 11:36

## 2019-09-12 RX ADMIN — LOSARTAN POTASSIUM 50 MG: 25 TABLET, FILM COATED ORAL at 08:44

## 2019-09-12 RX ADMIN — METOPROLOL TARTRATE 50 MG: 50 TABLET ORAL at 08:44

## 2019-09-12 RX ADMIN — IPRATROPIUM BROMIDE AND ALBUTEROL SULFATE 1 AMPULE: .5; 3 SOLUTION RESPIRATORY (INHALATION) at 08:20

## 2019-09-12 RX ADMIN — PREDNISONE 5 MG: 5 TABLET ORAL at 08:44

## 2019-09-12 RX ADMIN — METOPROLOL TARTRATE 50 MG: 50 TABLET ORAL at 22:33

## 2019-09-12 RX ADMIN — BUDESONIDE 500 MCG: 0.5 SUSPENSION RESPIRATORY (INHALATION) at 08:20

## 2019-09-12 RX ADMIN — ATORVASTATIN CALCIUM 40 MG: 40 TABLET, FILM COATED ORAL at 22:33

## 2019-09-12 RX ADMIN — Medication 10 ML: at 08:45

## 2019-09-12 RX ADMIN — LORAZEPAM 0.25 MG: 0.5 TABLET ORAL at 08:43

## 2019-09-12 RX ADMIN — ENOXAPARIN SODIUM 40 MG: 40 INJECTION SUBCUTANEOUS at 08:50

## 2019-09-12 RX ADMIN — Medication 10 ML: at 22:33

## 2019-09-12 RX ADMIN — IPRATROPIUM BROMIDE AND ALBUTEROL SULFATE 1 AMPULE: .5; 3 SOLUTION RESPIRATORY (INHALATION) at 03:19

## 2019-09-12 ASSESSMENT — PAIN DESCRIPTION - DESCRIPTORS: DESCRIPTORS: CONSTANT

## 2019-09-12 ASSESSMENT — PAIN DESCRIPTION - LOCATION: LOCATION: THROAT

## 2019-09-12 ASSESSMENT — PAIN SCALES - GENERAL
PAINLEVEL_OUTOF10: 0

## 2019-09-13 LAB
ALBUMIN SERPL-MCNC: 3.4 G/DL (ref 3.4–5)
ANION GAP SERPL CALCULATED.3IONS-SCNC: 10 MMOL/L (ref 3–16)
BUN BLDV-MCNC: 19 MG/DL (ref 7–20)
CALCIUM SERPL-MCNC: 9.2 MG/DL (ref 8.3–10.6)
CHLORIDE BLD-SCNC: 100 MMOL/L (ref 99–110)
CO2: 32 MMOL/L (ref 21–32)
CREAT SERPL-MCNC: 1 MG/DL (ref 0.6–1.2)
EKG ATRIAL RATE: 87 BPM
EKG DIAGNOSIS: NORMAL
EKG P AXIS: 81 DEGREES
EKG P-R INTERVAL: 140 MS
EKG Q-T INTERVAL: 372 MS
EKG QRS DURATION: 90 MS
EKG QTC CALCULATION (BAZETT): 447 MS
EKG R AXIS: -38 DEGREES
EKG T AXIS: 83 DEGREES
EKG VENTRICULAR RATE: 87 BPM
GFR AFRICAN AMERICAN: >60
GFR NON-AFRICAN AMERICAN: 54
GLUCOSE BLD-MCNC: 108 MG/DL (ref 70–99)
PHOSPHORUS: 3.3 MG/DL (ref 2.5–4.9)
POTASSIUM SERPL-SCNC: 3.7 MMOL/L (ref 3.5–5.1)
SODIUM BLD-SCNC: 142 MMOL/L (ref 136–145)
TROPONIN: 0.1 NG/ML
TROPONIN: 0.16 NG/ML

## 2019-09-13 PROCEDURE — 94660 CPAP INITIATION&MGMT: CPT

## 2019-09-13 PROCEDURE — 6370000000 HC RX 637 (ALT 250 FOR IP): Performed by: INTERNAL MEDICINE

## 2019-09-13 PROCEDURE — 93010 ELECTROCARDIOGRAM REPORT: CPT | Performed by: INTERNAL MEDICINE

## 2019-09-13 PROCEDURE — 97110 THERAPEUTIC EXERCISES: CPT

## 2019-09-13 PROCEDURE — 6370000000 HC RX 637 (ALT 250 FOR IP): Performed by: NURSE PRACTITIONER

## 2019-09-13 PROCEDURE — 99024 POSTOP FOLLOW-UP VISIT: CPT | Performed by: THORACIC SURGERY (CARDIOTHORACIC VASCULAR SURGERY)

## 2019-09-13 PROCEDURE — 2060000000 HC ICU INTERMEDIATE R&B

## 2019-09-13 PROCEDURE — 99232 SBSQ HOSP IP/OBS MODERATE 35: CPT | Performed by: INTERNAL MEDICINE

## 2019-09-13 PROCEDURE — 36415 COLL VENOUS BLD VENIPUNCTURE: CPT

## 2019-09-13 PROCEDURE — 6360000002 HC RX W HCPCS: Performed by: INTERNAL MEDICINE

## 2019-09-13 PROCEDURE — 2580000003 HC RX 258: Performed by: INTERNAL MEDICINE

## 2019-09-13 PROCEDURE — 93005 ELECTROCARDIOGRAM TRACING: CPT | Performed by: INTERNAL MEDICINE

## 2019-09-13 PROCEDURE — 94761 N-INVAS EAR/PLS OXIMETRY MLT: CPT

## 2019-09-13 PROCEDURE — 80069 RENAL FUNCTION PANEL: CPT

## 2019-09-13 PROCEDURE — 99233 SBSQ HOSP IP/OBS HIGH 50: CPT | Performed by: INTERNAL MEDICINE

## 2019-09-13 PROCEDURE — 94669 MECHANICAL CHEST WALL OSCILL: CPT

## 2019-09-13 PROCEDURE — 2700000000 HC OXYGEN THERAPY PER DAY

## 2019-09-13 PROCEDURE — 84484 ASSAY OF TROPONIN QUANT: CPT

## 2019-09-13 PROCEDURE — 94640 AIRWAY INHALATION TREATMENT: CPT

## 2019-09-13 RX ORDER — CETIRIZINE HYDROCHLORIDE 10 MG/1
5 TABLET ORAL DAILY
Status: DISCONTINUED | OUTPATIENT
Start: 2019-09-13 | End: 2019-09-24 | Stop reason: HOSPADM

## 2019-09-13 RX ORDER — PAROXETINE HYDROCHLORIDE 20 MG/1
10 TABLET, FILM COATED ORAL DAILY
Status: DISCONTINUED | OUTPATIENT
Start: 2019-09-13 | End: 2019-09-19

## 2019-09-13 RX ORDER — FUROSEMIDE 10 MG/ML
20 INJECTION INTRAMUSCULAR; INTRAVENOUS ONCE
Status: COMPLETED | OUTPATIENT
Start: 2019-09-13 | End: 2019-09-13

## 2019-09-13 RX ORDER — NITROGLYCERIN 0.4 MG/1
0.4 TABLET SUBLINGUAL EVERY 5 MIN PRN
Status: DISCONTINUED | OUTPATIENT
Start: 2019-09-13 | End: 2019-09-19

## 2019-09-13 RX ADMIN — Medication 10 ML: at 19:52

## 2019-09-13 RX ADMIN — CETIRIZINE HYDROCHLORIDE 5 MG: 10 TABLET, FILM COATED ORAL at 12:01

## 2019-09-13 RX ADMIN — Medication 2 PUFF: at 01:38

## 2019-09-13 RX ADMIN — BUDESONIDE 500 MCG: 0.5 SUSPENSION RESPIRATORY (INHALATION) at 07:42

## 2019-09-13 RX ADMIN — IPRATROPIUM BROMIDE AND ALBUTEROL SULFATE 1 AMPULE: .5; 3 SOLUTION RESPIRATORY (INHALATION) at 15:25

## 2019-09-13 RX ADMIN — IPRATROPIUM BROMIDE AND ALBUTEROL SULFATE 1 AMPULE: .5; 3 SOLUTION RESPIRATORY (INHALATION) at 21:10

## 2019-09-13 RX ADMIN — PAROXETINE HYDROCHLORIDE 10 MG: 20 TABLET, FILM COATED ORAL at 12:01

## 2019-09-13 RX ADMIN — Medication 10 ML: at 08:35

## 2019-09-13 RX ADMIN — ATORVASTATIN CALCIUM 40 MG: 40 TABLET, FILM COATED ORAL at 19:51

## 2019-09-13 RX ADMIN — ASPIRIN 81 MG: 81 TABLET, COATED ORAL at 08:33

## 2019-09-13 RX ADMIN — IPRATROPIUM BROMIDE AND ALBUTEROL SULFATE 1 AMPULE: .5; 3 SOLUTION RESPIRATORY (INHALATION) at 12:07

## 2019-09-13 RX ADMIN — METOPROLOL TARTRATE 50 MG: 50 TABLET ORAL at 08:33

## 2019-09-13 RX ADMIN — PREDNISONE 20 MG: 20 TABLET ORAL at 08:33

## 2019-09-13 RX ADMIN — HYDROXYZINE HYDROCHLORIDE 10 MG: 10 TABLET, FILM COATED ORAL at 19:55

## 2019-09-13 RX ADMIN — LOSARTAN POTASSIUM 50 MG: 25 TABLET, FILM COATED ORAL at 08:33

## 2019-09-13 RX ADMIN — BUDESONIDE 500 MCG: 0.5 SUSPENSION RESPIRATORY (INHALATION) at 21:10

## 2019-09-13 RX ADMIN — FUROSEMIDE 20 MG: 10 INJECTION, SOLUTION INTRAMUSCULAR; INTRAVENOUS at 17:08

## 2019-09-13 RX ADMIN — ENOXAPARIN SODIUM 40 MG: 40 INJECTION SUBCUTANEOUS at 08:33

## 2019-09-13 RX ADMIN — IPRATROPIUM BROMIDE AND ALBUTEROL SULFATE 1 AMPULE: .5; 3 SOLUTION RESPIRATORY (INHALATION) at 07:42

## 2019-09-13 RX ADMIN — METOPROLOL TARTRATE 50 MG: 50 TABLET ORAL at 19:51

## 2019-09-13 RX ADMIN — LORAZEPAM 0.25 MG: 0.5 TABLET ORAL at 01:45

## 2019-09-13 ASSESSMENT — PAIN SCALES - GENERAL
PAINLEVEL_OUTOF10: 0
PAINLEVEL_OUTOF10: 5

## 2019-09-14 PROBLEM — E87.0 HYPERNATREMIA: Status: ACTIVE | Noted: 2019-09-14

## 2019-09-14 LAB
ALBUMIN SERPL-MCNC: 3 G/DL (ref 3.4–5)
ANION GAP SERPL CALCULATED.3IONS-SCNC: 11 MMOL/L (ref 3–16)
BUN BLDV-MCNC: 26 MG/DL (ref 7–20)
CALCIUM SERPL-MCNC: 8.8 MG/DL (ref 8.3–10.6)
CHLORIDE BLD-SCNC: 103 MMOL/L (ref 99–110)
CO2: 32 MMOL/L (ref 21–32)
CREAT SERPL-MCNC: 1 MG/DL (ref 0.6–1.2)
GFR AFRICAN AMERICAN: >60
GFR NON-AFRICAN AMERICAN: 54
GLUCOSE BLD-MCNC: 113 MG/DL (ref 70–99)
ORGANISM: ABNORMAL
PHOSPHORUS: 3.4 MG/DL (ref 2.5–4.9)
POTASSIUM SERPL-SCNC: 3.3 MMOL/L (ref 3.5–5.1)
SODIUM BLD-SCNC: 146 MMOL/L (ref 136–145)
URINE CULTURE, ROUTINE: ABNORMAL

## 2019-09-14 PROCEDURE — 6370000000 HC RX 637 (ALT 250 FOR IP): Performed by: INTERNAL MEDICINE

## 2019-09-14 PROCEDURE — 99232 SBSQ HOSP IP/OBS MODERATE 35: CPT | Performed by: NURSE PRACTITIONER

## 2019-09-14 PROCEDURE — 6370000000 HC RX 637 (ALT 250 FOR IP): Performed by: NURSE PRACTITIONER

## 2019-09-14 PROCEDURE — 2580000003 HC RX 258: Performed by: INTERNAL MEDICINE

## 2019-09-14 PROCEDURE — 94640 AIRWAY INHALATION TREATMENT: CPT

## 2019-09-14 PROCEDURE — 80069 RENAL FUNCTION PANEL: CPT

## 2019-09-14 PROCEDURE — 6360000002 HC RX W HCPCS: Performed by: INTERNAL MEDICINE

## 2019-09-14 PROCEDURE — 2700000000 HC OXYGEN THERAPY PER DAY

## 2019-09-14 PROCEDURE — 94761 N-INVAS EAR/PLS OXIMETRY MLT: CPT

## 2019-09-14 PROCEDURE — 2060000000 HC ICU INTERMEDIATE R&B

## 2019-09-14 PROCEDURE — 94669 MECHANICAL CHEST WALL OSCILL: CPT

## 2019-09-14 PROCEDURE — 99233 SBSQ HOSP IP/OBS HIGH 50: CPT | Performed by: INTERNAL MEDICINE

## 2019-09-14 PROCEDURE — 36415 COLL VENOUS BLD VENIPUNCTURE: CPT

## 2019-09-14 PROCEDURE — 94150 VITAL CAPACITY TEST: CPT

## 2019-09-14 RX ORDER — CHLOROTHIAZIDE SODIUM 500 MG/1
500 INJECTION INTRAVENOUS ONCE
Status: COMPLETED | OUTPATIENT
Start: 2019-09-14 | End: 2019-09-14

## 2019-09-14 RX ORDER — BENZONATATE 100 MG/1
100 CAPSULE ORAL 3 TIMES DAILY PRN
Status: DISCONTINUED | OUTPATIENT
Start: 2019-09-14 | End: 2019-09-19

## 2019-09-14 RX ORDER — POTASSIUM CHLORIDE 20 MEQ/1
40 TABLET, EXTENDED RELEASE ORAL ONCE
Status: COMPLETED | OUTPATIENT
Start: 2019-09-14 | End: 2019-09-14

## 2019-09-14 RX ORDER — METHYLPREDNISOLONE SODIUM SUCCINATE 40 MG/ML
40 INJECTION, POWDER, LYOPHILIZED, FOR SOLUTION INTRAMUSCULAR; INTRAVENOUS ONCE
Status: COMPLETED | OUTPATIENT
Start: 2019-09-14 | End: 2019-09-14

## 2019-09-14 RX ORDER — FAMOTIDINE 20 MG/1
20 TABLET, FILM COATED ORAL DAILY
Status: DISCONTINUED | OUTPATIENT
Start: 2019-09-14 | End: 2019-09-19

## 2019-09-14 RX ORDER — POTASSIUM CHLORIDE 20 MEQ/1
20 TABLET, EXTENDED RELEASE ORAL ONCE
Status: COMPLETED | OUTPATIENT
Start: 2019-09-14 | End: 2019-09-14

## 2019-09-14 RX ADMIN — Medication 10 ML: at 20:59

## 2019-09-14 RX ADMIN — BUDESONIDE 500 MCG: 0.5 SUSPENSION RESPIRATORY (INHALATION) at 19:09

## 2019-09-14 RX ADMIN — ENOXAPARIN SODIUM 40 MG: 40 INJECTION SUBCUTANEOUS at 08:25

## 2019-09-14 RX ADMIN — Medication 10 ML: at 09:00

## 2019-09-14 RX ADMIN — METHYLPREDNISOLONE SODIUM SUCCINATE 40 MG: 40 INJECTION, POWDER, FOR SOLUTION INTRAMUSCULAR; INTRAVENOUS at 12:49

## 2019-09-14 RX ADMIN — METOPROLOL TARTRATE 50 MG: 50 TABLET ORAL at 08:25

## 2019-09-14 RX ADMIN — ONDANSETRON 4 MG: 2 INJECTION INTRAMUSCULAR; INTRAVENOUS at 17:55

## 2019-09-14 RX ADMIN — IPRATROPIUM BROMIDE AND ALBUTEROL SULFATE 1 AMPULE: .5; 3 SOLUTION RESPIRATORY (INHALATION) at 19:09

## 2019-09-14 RX ADMIN — CETIRIZINE HYDROCHLORIDE 5 MG: 10 TABLET, FILM COATED ORAL at 08:24

## 2019-09-14 RX ADMIN — PAROXETINE HYDROCHLORIDE 10 MG: 20 TABLET, FILM COATED ORAL at 08:24

## 2019-09-14 RX ADMIN — IPRATROPIUM BROMIDE AND ALBUTEROL SULFATE 1 AMPULE: .5; 3 SOLUTION RESPIRATORY (INHALATION) at 15:38

## 2019-09-14 RX ADMIN — HYDROXYZINE HYDROCHLORIDE 10 MG: 10 TABLET, FILM COATED ORAL at 18:01

## 2019-09-14 RX ADMIN — PREDNISONE 20 MG: 20 TABLET ORAL at 08:24

## 2019-09-14 RX ADMIN — FAMOTIDINE 20 MG: 20 TABLET ORAL at 20:35

## 2019-09-14 RX ADMIN — IPRATROPIUM BROMIDE AND ALBUTEROL SULFATE 1 AMPULE: .5; 3 SOLUTION RESPIRATORY (INHALATION) at 08:02

## 2019-09-14 RX ADMIN — NITROGLYCERIN 0.4 MG: 0.4 TABLET, ORALLY DISINTEGRATING SUBLINGUAL at 17:46

## 2019-09-14 RX ADMIN — POTASSIUM CHLORIDE 20 MEQ: 20 TABLET, EXTENDED RELEASE ORAL at 13:55

## 2019-09-14 RX ADMIN — FUROSEMIDE 40 MG: 40 TABLET ORAL at 08:24

## 2019-09-14 RX ADMIN — ATORVASTATIN CALCIUM 40 MG: 40 TABLET, FILM COATED ORAL at 20:43

## 2019-09-14 RX ADMIN — METOPROLOL TARTRATE 50 MG: 50 TABLET ORAL at 20:43

## 2019-09-14 RX ADMIN — CHLOROTHIAZIDE SODIUM 500 MG: 500 INJECTION, POWDER, LYOPHILIZED, FOR SOLUTION INTRAVENOUS at 13:19

## 2019-09-14 RX ADMIN — NITROGLYCERIN 0.4 MG: 0.4 TABLET, ORALLY DISINTEGRATING SUBLINGUAL at 17:51

## 2019-09-14 RX ADMIN — Medication 10 ML: at 20:55

## 2019-09-14 RX ADMIN — Medication 1 LOZENGE: at 13:18

## 2019-09-14 RX ADMIN — BUDESONIDE 500 MCG: 0.5 SUSPENSION RESPIRATORY (INHALATION) at 08:02

## 2019-09-14 RX ADMIN — IPRATROPIUM BROMIDE AND ALBUTEROL SULFATE 1 AMPULE: .5; 3 SOLUTION RESPIRATORY (INHALATION) at 11:31

## 2019-09-14 RX ADMIN — ASPIRIN 81 MG: 81 TABLET, COATED ORAL at 08:24

## 2019-09-14 RX ADMIN — LOSARTAN POTASSIUM 50 MG: 25 TABLET, FILM COATED ORAL at 08:24

## 2019-09-14 RX ADMIN — NITROGLYCERIN 0.4 MG: 0.4 TABLET, ORALLY DISINTEGRATING SUBLINGUAL at 17:57

## 2019-09-14 RX ADMIN — POTASSIUM CHLORIDE 40 MEQ: 20 TABLET, EXTENDED RELEASE ORAL at 12:49

## 2019-09-14 ASSESSMENT — PAIN SCALES - GENERAL
PAINLEVEL_OUTOF10: 0

## 2019-09-14 ASSESSMENT — ENCOUNTER SYMPTOMS
COUGH: 1
SHORTNESS OF BREATH: 1
SORE THROAT: 1

## 2019-09-15 ENCOUNTER — APPOINTMENT (OUTPATIENT)
Dept: GENERAL RADIOLOGY | Age: 79
DRG: 235 | End: 2019-09-15
Payer: MEDICARE

## 2019-09-15 LAB
ALBUMIN SERPL-MCNC: 3.1 G/DL (ref 3.4–5)
ANION GAP SERPL CALCULATED.3IONS-SCNC: 13 MMOL/L (ref 3–16)
BUN BLDV-MCNC: 35 MG/DL (ref 7–20)
CALCIUM SERPL-MCNC: 9.2 MG/DL (ref 8.3–10.6)
CHLORIDE BLD-SCNC: 100 MMOL/L (ref 99–110)
CO2: 30 MMOL/L (ref 21–32)
CREAT SERPL-MCNC: 1.3 MG/DL (ref 0.6–1.2)
GFR AFRICAN AMERICAN: 48
GFR NON-AFRICAN AMERICAN: 40
GLUCOSE BLD-MCNC: 113 MG/DL (ref 70–99)
PHOSPHORUS: 4.3 MG/DL (ref 2.5–4.9)
POTASSIUM SERPL-SCNC: 4.3 MMOL/L (ref 3.5–5.1)
SODIUM BLD-SCNC: 143 MMOL/L (ref 136–145)

## 2019-09-15 PROCEDURE — 6370000000 HC RX 637 (ALT 250 FOR IP): Performed by: INTERNAL MEDICINE

## 2019-09-15 PROCEDURE — 99233 SBSQ HOSP IP/OBS HIGH 50: CPT | Performed by: INTERNAL MEDICINE

## 2019-09-15 PROCEDURE — 6370000000 HC RX 637 (ALT 250 FOR IP): Performed by: NURSE PRACTITIONER

## 2019-09-15 PROCEDURE — 94669 MECHANICAL CHEST WALL OSCILL: CPT

## 2019-09-15 PROCEDURE — 99232 SBSQ HOSP IP/OBS MODERATE 35: CPT | Performed by: NURSE PRACTITIONER

## 2019-09-15 PROCEDURE — 94640 AIRWAY INHALATION TREATMENT: CPT

## 2019-09-15 PROCEDURE — 36415 COLL VENOUS BLD VENIPUNCTURE: CPT

## 2019-09-15 PROCEDURE — 2060000000 HC ICU INTERMEDIATE R&B

## 2019-09-15 PROCEDURE — 94761 N-INVAS EAR/PLS OXIMETRY MLT: CPT

## 2019-09-15 PROCEDURE — 6360000002 HC RX W HCPCS: Performed by: INTERNAL MEDICINE

## 2019-09-15 PROCEDURE — 2580000003 HC RX 258: Performed by: INTERNAL MEDICINE

## 2019-09-15 PROCEDURE — 71045 X-RAY EXAM CHEST 1 VIEW: CPT

## 2019-09-15 PROCEDURE — 80069 RENAL FUNCTION PANEL: CPT

## 2019-09-15 PROCEDURE — 2700000000 HC OXYGEN THERAPY PER DAY

## 2019-09-15 RX ORDER — CYCLOBENZAPRINE HCL 10 MG
10 TABLET ORAL 3 TIMES DAILY PRN
Status: DISCONTINUED | OUTPATIENT
Start: 2019-09-15 | End: 2019-09-19

## 2019-09-15 RX ORDER — FLUCONAZOLE 100 MG/1
200 TABLET ORAL DAILY
Status: DISCONTINUED | OUTPATIENT
Start: 2019-09-15 | End: 2019-09-19

## 2019-09-15 RX ORDER — SODIUM CHLORIDE 9 MG/ML
INJECTION, SOLUTION INTRAVENOUS
Status: DISPENSED
Start: 2019-09-15 | End: 2019-09-16

## 2019-09-15 RX ORDER — SODIUM CHLORIDE 9 MG/ML
INJECTION, SOLUTION INTRAVENOUS CONTINUOUS
Status: DISCONTINUED | OUTPATIENT
Start: 2019-09-15 | End: 2019-09-18

## 2019-09-15 RX ADMIN — NITROGLYCERIN 0.4 MG: 0.4 TABLET, ORALLY DISINTEGRATING SUBLINGUAL at 14:40

## 2019-09-15 RX ADMIN — FUROSEMIDE 40 MG: 40 TABLET ORAL at 09:00

## 2019-09-15 RX ADMIN — CETIRIZINE HYDROCHLORIDE 5 MG: 10 TABLET, FILM COATED ORAL at 09:00

## 2019-09-15 RX ADMIN — ATORVASTATIN CALCIUM 40 MG: 40 TABLET, FILM COATED ORAL at 21:04

## 2019-09-15 RX ADMIN — FLUCONAZOLE 200 MG: 100 TABLET ORAL at 15:22

## 2019-09-15 RX ADMIN — ASPIRIN 81 MG: 81 TABLET, COATED ORAL at 09:00

## 2019-09-15 RX ADMIN — IPRATROPIUM BROMIDE AND ALBUTEROL SULFATE 1 AMPULE: .5; 3 SOLUTION RESPIRATORY (INHALATION) at 15:44

## 2019-09-15 RX ADMIN — METOPROLOL TARTRATE 50 MG: 50 TABLET ORAL at 21:03

## 2019-09-15 RX ADMIN — Medication 1 LOZENGE: at 14:13

## 2019-09-15 RX ADMIN — Medication 10 ML: at 09:00

## 2019-09-15 RX ADMIN — Medication 1 LOZENGE: at 21:07

## 2019-09-15 RX ADMIN — IPRATROPIUM BROMIDE AND ALBUTEROL SULFATE 1 AMPULE: .5; 3 SOLUTION RESPIRATORY (INHALATION) at 11:34

## 2019-09-15 RX ADMIN — SODIUM CHLORIDE: 9 INJECTION, SOLUTION INTRAVENOUS at 15:12

## 2019-09-15 RX ADMIN — IPRATROPIUM BROMIDE AND ALBUTEROL SULFATE 1 AMPULE: .5; 3 SOLUTION RESPIRATORY (INHALATION) at 21:15

## 2019-09-15 RX ADMIN — HYDROXYZINE HYDROCHLORIDE 10 MG: 10 TABLET, FILM COATED ORAL at 12:30

## 2019-09-15 RX ADMIN — SALINE NASAL SPRAY 1 SPRAY: 1.5 SOLUTION NASAL at 15:22

## 2019-09-15 RX ADMIN — CYCLOBENZAPRINE HYDROCHLORIDE 10 MG: 10 TABLET, FILM COATED ORAL at 12:30

## 2019-09-15 RX ADMIN — PREDNISONE 20 MG: 20 TABLET ORAL at 09:00

## 2019-09-15 RX ADMIN — Medication 1 LOZENGE: at 09:00

## 2019-09-15 RX ADMIN — PAROXETINE HYDROCHLORIDE 10 MG: 20 TABLET, FILM COATED ORAL at 09:00

## 2019-09-15 RX ADMIN — BUDESONIDE 500 MCG: 0.5 SUSPENSION RESPIRATORY (INHALATION) at 07:55

## 2019-09-15 RX ADMIN — FAMOTIDINE 20 MG: 20 TABLET ORAL at 09:00

## 2019-09-15 RX ADMIN — BUDESONIDE 500 MCG: 0.5 SUSPENSION RESPIRATORY (INHALATION) at 21:16

## 2019-09-15 RX ADMIN — IPRATROPIUM BROMIDE AND ALBUTEROL SULFATE 1 AMPULE: .5; 3 SOLUTION RESPIRATORY (INHALATION) at 07:26

## 2019-09-15 RX ADMIN — METOPROLOL TARTRATE 50 MG: 50 TABLET ORAL at 09:00

## 2019-09-15 RX ADMIN — LOSARTAN POTASSIUM 50 MG: 25 TABLET, FILM COATED ORAL at 09:00

## 2019-09-15 RX ADMIN — ENOXAPARIN SODIUM 40 MG: 40 INJECTION SUBCUTANEOUS at 09:00

## 2019-09-15 RX ADMIN — BENZONATATE 100 MG: 100 CAPSULE ORAL at 14:12

## 2019-09-15 ASSESSMENT — PAIN SCALES - GENERAL
PAINLEVEL_OUTOF10: 0

## 2019-09-15 ASSESSMENT — ENCOUNTER SYMPTOMS
SORE THROAT: 1
COUGH: 1
SHORTNESS OF BREATH: 1

## 2019-09-16 ENCOUNTER — APPOINTMENT (OUTPATIENT)
Dept: GENERAL RADIOLOGY | Age: 79
DRG: 235 | End: 2019-09-16
Payer: MEDICARE

## 2019-09-16 LAB
ALBUMIN SERPL-MCNC: 3.2 G/DL (ref 3.4–5)
ANION GAP SERPL CALCULATED.3IONS-SCNC: 10 MMOL/L (ref 3–16)
BUN BLDV-MCNC: 40 MG/DL (ref 7–20)
CALCIUM SERPL-MCNC: 9.4 MG/DL (ref 8.3–10.6)
CHLORIDE BLD-SCNC: 103 MMOL/L (ref 99–110)
CO2: 30 MMOL/L (ref 21–32)
CREAT SERPL-MCNC: 1.2 MG/DL (ref 0.6–1.2)
GFR AFRICAN AMERICAN: 53
GFR NON-AFRICAN AMERICAN: 43
GLUCOSE BLD-MCNC: 100 MG/DL (ref 70–99)
HCT VFR BLD CALC: 38 % (ref 36–48)
HEMOGLOBIN: 12.3 G/DL (ref 12–16)
MCH RBC QN AUTO: 31.3 PG (ref 26–34)
MCHC RBC AUTO-ENTMCNC: 32.4 G/DL (ref 31–36)
MCV RBC AUTO: 96.7 FL (ref 80–100)
PDW BLD-RTO: 13.8 % (ref 12.4–15.4)
PHOSPHORUS: 3.7 MG/DL (ref 2.5–4.9)
PLATELET # BLD: 233 K/UL (ref 135–450)
PMV BLD AUTO: 9 FL (ref 5–10.5)
POTASSIUM SERPL-SCNC: 4.3 MMOL/L (ref 3.5–5.1)
RBC # BLD: 3.93 M/UL (ref 4–5.2)
SODIUM BLD-SCNC: 143 MMOL/L (ref 136–145)
WBC # BLD: 7.1 K/UL (ref 4–11)

## 2019-09-16 PROCEDURE — 36415 COLL VENOUS BLD VENIPUNCTURE: CPT

## 2019-09-16 PROCEDURE — 92610 EVALUATE SWALLOWING FUNCTION: CPT

## 2019-09-16 PROCEDURE — 2580000003 HC RX 258: Performed by: INTERNAL MEDICINE

## 2019-09-16 PROCEDURE — 99232 SBSQ HOSP IP/OBS MODERATE 35: CPT | Performed by: INTERNAL MEDICINE

## 2019-09-16 PROCEDURE — 94669 MECHANICAL CHEST WALL OSCILL: CPT

## 2019-09-16 PROCEDURE — 6370000000 HC RX 637 (ALT 250 FOR IP): Performed by: INTERNAL MEDICINE

## 2019-09-16 PROCEDURE — 6360000002 HC RX W HCPCS: Performed by: INTERNAL MEDICINE

## 2019-09-16 PROCEDURE — 74230 X-RAY XM SWLNG FUNCJ C+: CPT

## 2019-09-16 PROCEDURE — 80069 RENAL FUNCTION PANEL: CPT

## 2019-09-16 PROCEDURE — 85027 COMPLETE CBC AUTOMATED: CPT

## 2019-09-16 PROCEDURE — 97110 THERAPEUTIC EXERCISES: CPT

## 2019-09-16 PROCEDURE — 6370000000 HC RX 637 (ALT 250 FOR IP): Performed by: NURSE PRACTITIONER

## 2019-09-16 PROCEDURE — 6370000000 HC RX 637 (ALT 250 FOR IP): Performed by: THORACIC SURGERY (CARDIOTHORACIC VASCULAR SURGERY)

## 2019-09-16 PROCEDURE — 94640 AIRWAY INHALATION TREATMENT: CPT

## 2019-09-16 PROCEDURE — 92611 MOTION FLUOROSCOPY/SWALLOW: CPT

## 2019-09-16 PROCEDURE — 94761 N-INVAS EAR/PLS OXIMETRY MLT: CPT

## 2019-09-16 PROCEDURE — 2060000000 HC ICU INTERMEDIATE R&B

## 2019-09-16 PROCEDURE — 2700000000 HC OXYGEN THERAPY PER DAY

## 2019-09-16 RX ORDER — PREDNISONE 10 MG/1
10 TABLET ORAL DAILY
Status: DISCONTINUED | OUTPATIENT
Start: 2019-09-16 | End: 2019-09-24 | Stop reason: HOSPADM

## 2019-09-16 RX ADMIN — CETIRIZINE HYDROCHLORIDE 5 MG: 10 TABLET, FILM COATED ORAL at 10:54

## 2019-09-16 RX ADMIN — PAROXETINE HYDROCHLORIDE 10 MG: 20 TABLET, FILM COATED ORAL at 10:55

## 2019-09-16 RX ADMIN — IPRATROPIUM BROMIDE AND ALBUTEROL SULFATE 1 AMPULE: .5; 3 SOLUTION RESPIRATORY (INHALATION) at 19:30

## 2019-09-16 RX ADMIN — Medication 1 LOZENGE: at 17:07

## 2019-09-16 RX ADMIN — ENOXAPARIN SODIUM 30 MG: 30 INJECTION SUBCUTANEOUS at 10:52

## 2019-09-16 RX ADMIN — Medication 1 LOZENGE: at 20:56

## 2019-09-16 RX ADMIN — SALINE NASAL SPRAY 1 SPRAY: 1.5 SOLUTION NASAL at 04:35

## 2019-09-16 RX ADMIN — FAMOTIDINE 20 MG: 20 TABLET ORAL at 10:53

## 2019-09-16 RX ADMIN — IPRATROPIUM BROMIDE AND ALBUTEROL SULFATE 1 AMPULE: .5; 3 SOLUTION RESPIRATORY (INHALATION) at 04:13

## 2019-09-16 RX ADMIN — PREDNISONE 10 MG: 10 TABLET ORAL at 10:53

## 2019-09-16 RX ADMIN — METOPROLOL TARTRATE 50 MG: 50 TABLET ORAL at 10:53

## 2019-09-16 RX ADMIN — LOSARTAN POTASSIUM 50 MG: 25 TABLET, FILM COATED ORAL at 10:52

## 2019-09-16 RX ADMIN — ASPIRIN 81 MG: 81 TABLET, COATED ORAL at 10:56

## 2019-09-16 RX ADMIN — IPRATROPIUM BROMIDE AND ALBUTEROL SULFATE 1 AMPULE: .5; 3 SOLUTION RESPIRATORY (INHALATION) at 11:59

## 2019-09-16 RX ADMIN — FLUCONAZOLE 200 MG: 100 TABLET ORAL at 10:54

## 2019-09-16 RX ADMIN — METOPROLOL TARTRATE 50 MG: 50 TABLET ORAL at 20:56

## 2019-09-16 RX ADMIN — Medication 10 ML: at 20:57

## 2019-09-16 RX ADMIN — ATORVASTATIN CALCIUM 40 MG: 40 TABLET, FILM COATED ORAL at 20:56

## 2019-09-16 RX ADMIN — IPRATROPIUM BROMIDE AND ALBUTEROL SULFATE 1 AMPULE: .5; 3 SOLUTION RESPIRATORY (INHALATION) at 15:59

## 2019-09-16 RX ADMIN — IPRATROPIUM BROMIDE AND ALBUTEROL SULFATE 1 AMPULE: .5; 3 SOLUTION RESPIRATORY (INHALATION) at 08:13

## 2019-09-16 RX ADMIN — BUDESONIDE 500 MCG: 0.5 SUSPENSION RESPIRATORY (INHALATION) at 19:30

## 2019-09-16 ASSESSMENT — PAIN SCALES - GENERAL
PAINLEVEL_OUTOF10: 0
PAINLEVEL_OUTOF10: 0

## 2019-09-17 ENCOUNTER — APPOINTMENT (OUTPATIENT)
Dept: GENERAL RADIOLOGY | Age: 79
DRG: 235 | End: 2019-09-17
Payer: MEDICARE

## 2019-09-17 LAB
ALBUMIN SERPL-MCNC: 3.2 G/DL (ref 3.4–5)
ANION GAP SERPL CALCULATED.3IONS-SCNC: 7 MMOL/L (ref 3–16)
BUN BLDV-MCNC: 37 MG/DL (ref 7–20)
CALCIUM SERPL-MCNC: 9.4 MG/DL (ref 8.3–10.6)
CHLORIDE BLD-SCNC: 104 MMOL/L (ref 99–110)
CO2: 31 MMOL/L (ref 21–32)
CREAT SERPL-MCNC: 1.1 MG/DL (ref 0.6–1.2)
GFR AFRICAN AMERICAN: 58
GFR NON-AFRICAN AMERICAN: 48
GLUCOSE BLD-MCNC: 93 MG/DL (ref 70–99)
PHOSPHORUS: 3 MG/DL (ref 2.5–4.9)
POTASSIUM SERPL-SCNC: 4.4 MMOL/L (ref 3.5–5.1)
SODIUM BLD-SCNC: 142 MMOL/L (ref 136–145)

## 2019-09-17 PROCEDURE — 94761 N-INVAS EAR/PLS OXIMETRY MLT: CPT

## 2019-09-17 PROCEDURE — 94150 VITAL CAPACITY TEST: CPT

## 2019-09-17 PROCEDURE — 2060000000 HC ICU INTERMEDIATE R&B

## 2019-09-17 PROCEDURE — 94660 CPAP INITIATION&MGMT: CPT

## 2019-09-17 PROCEDURE — 36415 COLL VENOUS BLD VENIPUNCTURE: CPT

## 2019-09-17 PROCEDURE — 6370000000 HC RX 637 (ALT 250 FOR IP): Performed by: THORACIC SURGERY (CARDIOTHORACIC VASCULAR SURGERY)

## 2019-09-17 PROCEDURE — 6370000000 HC RX 637 (ALT 250 FOR IP): Performed by: INTERNAL MEDICINE

## 2019-09-17 PROCEDURE — 97116 GAIT TRAINING THERAPY: CPT

## 2019-09-17 PROCEDURE — 99233 SBSQ HOSP IP/OBS HIGH 50: CPT | Performed by: INTERNAL MEDICINE

## 2019-09-17 PROCEDURE — 6360000002 HC RX W HCPCS: Performed by: INTERNAL MEDICINE

## 2019-09-17 PROCEDURE — 80069 RENAL FUNCTION PANEL: CPT

## 2019-09-17 PROCEDURE — 2580000003 HC RX 258: Performed by: INTERNAL MEDICINE

## 2019-09-17 PROCEDURE — 94669 MECHANICAL CHEST WALL OSCILL: CPT

## 2019-09-17 PROCEDURE — 74220 X-RAY XM ESOPHAGUS 1CNTRST: CPT

## 2019-09-17 PROCEDURE — 99232 SBSQ HOSP IP/OBS MODERATE 35: CPT | Performed by: NURSE PRACTITIONER

## 2019-09-17 PROCEDURE — 94640 AIRWAY INHALATION TREATMENT: CPT

## 2019-09-17 PROCEDURE — 6370000000 HC RX 637 (ALT 250 FOR IP): Performed by: NURSE PRACTITIONER

## 2019-09-17 PROCEDURE — 2700000000 HC OXYGEN THERAPY PER DAY

## 2019-09-17 PROCEDURE — 71045 X-RAY EXAM CHEST 1 VIEW: CPT

## 2019-09-17 RX ADMIN — BUDESONIDE 500 MCG: 0.5 SUSPENSION RESPIRATORY (INHALATION) at 19:24

## 2019-09-17 RX ADMIN — Medication 1 LOZENGE: at 21:31

## 2019-09-17 RX ADMIN — BUDESONIDE 500 MCG: 0.5 SUSPENSION RESPIRATORY (INHALATION) at 07:44

## 2019-09-17 RX ADMIN — LOSARTAN POTASSIUM 50 MG: 25 TABLET, FILM COATED ORAL at 09:18

## 2019-09-17 RX ADMIN — PAROXETINE HYDROCHLORIDE 10 MG: 20 TABLET, FILM COATED ORAL at 09:20

## 2019-09-17 RX ADMIN — DIPHENHYDRAMINE HYDROCHLORIDE 5 ML: 12.5 SOLUTION ORAL at 20:41

## 2019-09-17 RX ADMIN — PREDNISONE 10 MG: 10 TABLET ORAL at 09:20

## 2019-09-17 RX ADMIN — ASPIRIN 81 MG: 81 TABLET, COATED ORAL at 09:19

## 2019-09-17 RX ADMIN — IPRATROPIUM BROMIDE AND ALBUTEROL SULFATE 1 AMPULE: .5; 3 SOLUTION RESPIRATORY (INHALATION) at 15:40

## 2019-09-17 RX ADMIN — METOPROLOL TARTRATE 50 MG: 50 TABLET ORAL at 21:31

## 2019-09-17 RX ADMIN — Medication 10 ML: at 09:22

## 2019-09-17 RX ADMIN — ENOXAPARIN SODIUM 30 MG: 30 INJECTION SUBCUTANEOUS at 09:18

## 2019-09-17 RX ADMIN — Medication 10 ML: at 21:31

## 2019-09-17 RX ADMIN — IPRATROPIUM BROMIDE AND ALBUTEROL SULFATE 1 AMPULE: .5; 3 SOLUTION RESPIRATORY (INHALATION) at 07:44

## 2019-09-17 RX ADMIN — Medication 1 LOZENGE: at 09:21

## 2019-09-17 RX ADMIN — IPRATROPIUM BROMIDE AND ALBUTEROL SULFATE 1 AMPULE: .5; 3 SOLUTION RESPIRATORY (INHALATION) at 12:30

## 2019-09-17 RX ADMIN — METOPROLOL TARTRATE 50 MG: 50 TABLET ORAL at 09:20

## 2019-09-17 RX ADMIN — CETIRIZINE HYDROCHLORIDE 5 MG: 10 TABLET, FILM COATED ORAL at 09:19

## 2019-09-17 RX ADMIN — FLUCONAZOLE 200 MG: 100 TABLET ORAL at 09:19

## 2019-09-17 RX ADMIN — ATORVASTATIN CALCIUM 40 MG: 40 TABLET, FILM COATED ORAL at 21:31

## 2019-09-17 RX ADMIN — IPRATROPIUM BROMIDE AND ALBUTEROL SULFATE 1 AMPULE: .5; 3 SOLUTION RESPIRATORY (INHALATION) at 19:24

## 2019-09-17 RX ADMIN — FAMOTIDINE 20 MG: 20 TABLET ORAL at 09:20

## 2019-09-17 ASSESSMENT — ENCOUNTER SYMPTOMS
EYES NEGATIVE: 1
ALLERGIC/IMMUNOLOGIC NEGATIVE: 1
SHORTNESS OF BREATH: 1
GASTROINTESTINAL NEGATIVE: 1
COUGH: 1

## 2019-09-17 ASSESSMENT — PAIN SCALES - GENERAL
PAINLEVEL_OUTOF10: 0

## 2019-09-18 ENCOUNTER — ANESTHESIA EVENT (OUTPATIENT)
Dept: OPERATING ROOM | Age: 79
DRG: 235 | End: 2019-09-18
Payer: MEDICARE

## 2019-09-18 LAB
ABO/RH: NORMAL
ALBUMIN SERPL-MCNC: 3 G/DL (ref 3.4–5)
AMORPHOUS: ABNORMAL /HPF
ANION GAP SERPL CALCULATED.3IONS-SCNC: 8 MMOL/L (ref 3–16)
ANTIBODY SCREEN: NORMAL
BACTERIA: ABNORMAL /HPF
BILIRUBIN URINE: NEGATIVE
BLOOD, URINE: NEGATIVE
BUN BLDV-MCNC: 31 MG/DL (ref 7–20)
CALCIUM SERPL-MCNC: 9.2 MG/DL (ref 8.3–10.6)
CHLORIDE BLD-SCNC: 102 MMOL/L (ref 99–110)
CLARITY: CLEAR
CO2: 32 MMOL/L (ref 21–32)
COLOR: YELLOW
CREAT SERPL-MCNC: 1 MG/DL (ref 0.6–1.2)
EPITHELIAL CELLS, UA: ABNORMAL /HPF
GFR AFRICAN AMERICAN: >60
GFR NON-AFRICAN AMERICAN: 54
GLUCOSE BLD-MCNC: 93 MG/DL (ref 70–99)
GLUCOSE URINE: NEGATIVE MG/DL
KETONES, URINE: NEGATIVE MG/DL
LEUKOCYTE ESTERASE, URINE: ABNORMAL
MICROSCOPIC EXAMINATION: YES
NITRITE, URINE: POSITIVE
PH UA: 7.5 (ref 5–8)
PHOSPHORUS: 3.1 MG/DL (ref 2.5–4.9)
POTASSIUM SERPL-SCNC: 4.2 MMOL/L (ref 3.5–5.1)
PROTEIN UA: ABNORMAL MG/DL
RBC UA: ABNORMAL /HPF (ref 0–2)
SODIUM BLD-SCNC: 142 MMOL/L (ref 136–145)
SPECIFIC GRAVITY UA: 1.01 (ref 1–1.03)
URINE TYPE: ABNORMAL
UROBILINOGEN, URINE: 0.2 E.U./DL
WBC UA: ABNORMAL /HPF (ref 0–5)

## 2019-09-18 PROCEDURE — 97110 THERAPEUTIC EXERCISES: CPT

## 2019-09-18 PROCEDURE — 2580000003 HC RX 258: Performed by: INTERNAL MEDICINE

## 2019-09-18 PROCEDURE — 6360000002 HC RX W HCPCS: Performed by: INTERNAL MEDICINE

## 2019-09-18 PROCEDURE — 36415 COLL VENOUS BLD VENIPUNCTURE: CPT

## 2019-09-18 PROCEDURE — 86901 BLOOD TYPING SEROLOGIC RH(D): CPT

## 2019-09-18 PROCEDURE — 80069 RENAL FUNCTION PANEL: CPT

## 2019-09-18 PROCEDURE — 6370000000 HC RX 637 (ALT 250 FOR IP): Performed by: INTERNAL MEDICINE

## 2019-09-18 PROCEDURE — 36430 TRANSFUSION BLD/BLD COMPNT: CPT

## 2019-09-18 PROCEDURE — 6370000000 HC RX 637 (ALT 250 FOR IP): Performed by: NURSE PRACTITIONER

## 2019-09-18 PROCEDURE — 6370000000 HC RX 637 (ALT 250 FOR IP): Performed by: THORACIC SURGERY (CARDIOTHORACIC VASCULAR SURGERY)

## 2019-09-18 PROCEDURE — 94669 MECHANICAL CHEST WALL OSCILL: CPT

## 2019-09-18 PROCEDURE — 94660 CPAP INITIATION&MGMT: CPT

## 2019-09-18 PROCEDURE — 99233 SBSQ HOSP IP/OBS HIGH 50: CPT | Performed by: INTERNAL MEDICINE

## 2019-09-18 PROCEDURE — 94761 N-INVAS EAR/PLS OXIMETRY MLT: CPT

## 2019-09-18 PROCEDURE — 2060000000 HC ICU INTERMEDIATE R&B

## 2019-09-18 PROCEDURE — 92526 ORAL FUNCTION THERAPY: CPT

## 2019-09-18 PROCEDURE — 2700000000 HC OXYGEN THERAPY PER DAY

## 2019-09-18 PROCEDURE — 97535 SELF CARE MNGMENT TRAINING: CPT

## 2019-09-18 PROCEDURE — 86900 BLOOD TYPING SEROLOGIC ABO: CPT

## 2019-09-18 PROCEDURE — 81001 URINALYSIS AUTO W/SCOPE: CPT

## 2019-09-18 PROCEDURE — 94640 AIRWAY INHALATION TREATMENT: CPT

## 2019-09-18 PROCEDURE — 86850 RBC ANTIBODY SCREEN: CPT

## 2019-09-18 PROCEDURE — P9016 RBC LEUKOCYTES REDUCED: HCPCS

## 2019-09-18 PROCEDURE — 99231 SBSQ HOSP IP/OBS SF/LOW 25: CPT | Performed by: NURSE PRACTITIONER

## 2019-09-18 PROCEDURE — 97116 GAIT TRAINING THERAPY: CPT

## 2019-09-18 PROCEDURE — 86923 COMPATIBILITY TEST ELECTRIC: CPT

## 2019-09-18 RX ORDER — SODIUM CHLORIDE 9 MG/ML
INJECTION, SOLUTION INTRAVENOUS CONTINUOUS
Status: DISCONTINUED | OUTPATIENT
Start: 2019-09-19 | End: 2019-09-19

## 2019-09-18 RX ORDER — BISACODYL 10 MG
10 SUPPOSITORY, RECTAL RECTAL ONCE
Status: COMPLETED | OUTPATIENT
Start: 2019-09-18 | End: 2019-09-18

## 2019-09-18 RX ORDER — CIPROFLOXACIN 500 MG/1
500 TABLET, FILM COATED ORAL EVERY 12 HOURS SCHEDULED
Status: DISCONTINUED | OUTPATIENT
Start: 2019-09-18 | End: 2019-09-19

## 2019-09-18 RX ORDER — CHLORHEXIDINE GLUCONATE 0.12 MG/ML
15 RINSE ORAL ONCE
Status: COMPLETED | OUTPATIENT
Start: 2019-09-19 | End: 2019-09-19

## 2019-09-18 RX ORDER — CHLORHEXIDINE GLUCONATE 4 G/100ML
SOLUTION TOPICAL SEE ADMIN INSTRUCTIONS
Status: DISCONTINUED | OUTPATIENT
Start: 2019-09-18 | End: 2019-09-19

## 2019-09-18 RX ORDER — ASPIRIN 81 MG/1
81 TABLET ORAL ONCE
Status: COMPLETED | OUTPATIENT
Start: 2019-09-19 | End: 2019-09-19

## 2019-09-18 RX ADMIN — MUPIROCIN: 20 OINTMENT TOPICAL at 22:22

## 2019-09-18 RX ADMIN — HYDROXYZINE HYDROCHLORIDE 10 MG: 10 TABLET, FILM COATED ORAL at 09:02

## 2019-09-18 RX ADMIN — CETIRIZINE HYDROCHLORIDE 5 MG: 10 TABLET, FILM COATED ORAL at 09:03

## 2019-09-18 RX ADMIN — PAROXETINE HYDROCHLORIDE 10 MG: 20 TABLET, FILM COATED ORAL at 09:02

## 2019-09-18 RX ADMIN — ATORVASTATIN CALCIUM 40 MG: 40 TABLET, FILM COATED ORAL at 22:23

## 2019-09-18 RX ADMIN — CHLORHEXIDINE GLUCONATE: 213 SOLUTION TOPICAL at 22:31

## 2019-09-18 RX ADMIN — IPRATROPIUM BROMIDE AND ALBUTEROL SULFATE 1 AMPULE: .5; 3 SOLUTION RESPIRATORY (INHALATION) at 16:12

## 2019-09-18 RX ADMIN — Medication 10 MG: at 22:22

## 2019-09-18 RX ADMIN — IPRATROPIUM BROMIDE AND ALBUTEROL SULFATE 1 AMPULE: .5; 3 SOLUTION RESPIRATORY (INHALATION) at 20:48

## 2019-09-18 RX ADMIN — IPRATROPIUM BROMIDE AND ALBUTEROL SULFATE 1 AMPULE: .5; 3 SOLUTION RESPIRATORY (INHALATION) at 11:36

## 2019-09-18 RX ADMIN — FLUCONAZOLE 200 MG: 100 TABLET ORAL at 09:02

## 2019-09-18 RX ADMIN — FAMOTIDINE 20 MG: 20 TABLET ORAL at 09:03

## 2019-09-18 RX ADMIN — LOSARTAN POTASSIUM 50 MG: 25 TABLET, FILM COATED ORAL at 09:02

## 2019-09-18 RX ADMIN — METOPROLOL TARTRATE 50 MG: 50 TABLET ORAL at 22:22

## 2019-09-18 RX ADMIN — Medication 10 ML: at 22:22

## 2019-09-18 RX ADMIN — Medication 1 LOZENGE: at 22:23

## 2019-09-18 RX ADMIN — Medication 1 LOZENGE: at 09:03

## 2019-09-18 RX ADMIN — BUDESONIDE 500 MCG: 0.5 SUSPENSION RESPIRATORY (INHALATION) at 07:18

## 2019-09-18 RX ADMIN — PREDNISONE 10 MG: 10 TABLET ORAL at 09:02

## 2019-09-18 RX ADMIN — BUDESONIDE 500 MCG: 0.5 SUSPENSION RESPIRATORY (INHALATION) at 20:48

## 2019-09-18 RX ADMIN — Medication 1 LOZENGE: at 13:02

## 2019-09-18 RX ADMIN — METOPROLOL TARTRATE 50 MG: 50 TABLET ORAL at 09:02

## 2019-09-18 RX ADMIN — DIPHENHYDRAMINE HYDROCHLORIDE 5 ML: 12.5 SOLUTION ORAL at 13:02

## 2019-09-18 RX ADMIN — IPRATROPIUM BROMIDE AND ALBUTEROL SULFATE 1 AMPULE: .5; 3 SOLUTION RESPIRATORY (INHALATION) at 07:17

## 2019-09-18 RX ADMIN — ENOXAPARIN SODIUM 30 MG: 30 INJECTION SUBCUTANEOUS at 09:03

## 2019-09-18 RX ADMIN — DIPHENHYDRAMINE HYDROCHLORIDE 5 ML: 12.5 SOLUTION ORAL at 07:43

## 2019-09-18 RX ADMIN — ASPIRIN 81 MG: 81 TABLET, COATED ORAL at 09:02

## 2019-09-18 RX ADMIN — Medication 10 ML: at 09:03

## 2019-09-18 RX ADMIN — CIPROFLOXACIN 500 MG: 500 TABLET, FILM COATED ORAL at 17:39

## 2019-09-18 ASSESSMENT — ENCOUNTER SYMPTOMS
SHORTNESS OF BREATH: 1
SHORTNESS OF BREATH: 1
COUGH: 1

## 2019-09-18 ASSESSMENT — PAIN SCALES - GENERAL
PAINLEVEL_OUTOF10: 0
PAINLEVEL_OUTOF10: 0

## 2019-09-18 ASSESSMENT — LIFESTYLE VARIABLES: SMOKING_STATUS: 1

## 2019-09-18 ASSESSMENT — COPD QUESTIONNAIRES: CAT_SEVERITY: SEVERE

## 2019-09-19 ENCOUNTER — APPOINTMENT (OUTPATIENT)
Dept: GENERAL RADIOLOGY | Age: 79
DRG: 235 | End: 2019-09-19
Payer: MEDICARE

## 2019-09-19 ENCOUNTER — ANESTHESIA (OUTPATIENT)
Dept: OPERATING ROOM | Age: 79
DRG: 235 | End: 2019-09-19
Payer: MEDICARE

## 2019-09-19 VITALS
OXYGEN SATURATION: 96 % | SYSTOLIC BLOOD PRESSURE: 72 MMHG | DIASTOLIC BLOOD PRESSURE: 59 MMHG | RESPIRATION RATE: 12 BRPM | TEMPERATURE: 98.9 F

## 2019-09-19 LAB
ACTIVATED CLOTTING TIME: 102 SEC (ref 99–130)
ACTIVATED CLOTTING TIME: 295 SEC (ref 99–130)
ACTIVATED CLOTTING TIME: 94 SEC (ref 99–130)
ALBUMIN SERPL-MCNC: 3.2 G/DL (ref 3.4–5)
ALBUMIN SERPL-MCNC: 3.3 G/DL (ref 3.4–5)
ALP BLD-CCNC: 50 U/L (ref 40–129)
ALT SERPL-CCNC: 11 U/L (ref 10–40)
ANION GAP SERPL CALCULATED.3IONS-SCNC: 8 MMOL/L (ref 3–16)
ANION GAP SERPL CALCULATED.3IONS-SCNC: 9 MMOL/L (ref 3–16)
ANION GAP SERPL CALCULATED.3IONS-SCNC: 9 MMOL/L (ref 3–16)
AST SERPL-CCNC: 12 U/L (ref 15–37)
BASE EXCESS ARTERIAL: -3 (ref -3–3)
BASE EXCESS ARTERIAL: -3 (ref -3–3)
BASE EXCESS ARTERIAL: -4 (ref -3–3)
BASE EXCESS ARTERIAL: 1 (ref -3–3)
BASE EXCESS ARTERIAL: 2 (ref -3–3)
BASE EXCESS ARTERIAL: 3 (ref -3–3)
BASE EXCESS ARTERIAL: 5 (ref -3–3)
BASOPHILS ABSOLUTE: 0 K/UL (ref 0–0.2)
BASOPHILS RELATIVE PERCENT: 0.2 %
BILIRUB SERPL-MCNC: 0.3 MG/DL (ref 0–1)
BILIRUBIN DIRECT: <0.2 MG/DL (ref 0–0.3)
BILIRUBIN, INDIRECT: ABNORMAL MG/DL (ref 0–1)
BUN BLDV-MCNC: 23 MG/DL (ref 7–20)
BUN BLDV-MCNC: 27 MG/DL (ref 7–20)
BUN BLDV-MCNC: 27 MG/DL (ref 7–20)
CALCIUM IONIZED: 1.07 MMOL/L (ref 1.12–1.32)
CALCIUM IONIZED: 1.21 MMOL/L (ref 1.12–1.32)
CALCIUM IONIZED: 1.21 MMOL/L (ref 1.12–1.32)
CALCIUM IONIZED: 1.22 MMOL/L (ref 1.12–1.32)
CALCIUM SERPL-MCNC: 7.9 MG/DL (ref 8.3–10.6)
CALCIUM SERPL-MCNC: 9.1 MG/DL (ref 8.3–10.6)
CALCIUM SERPL-MCNC: 9.2 MG/DL (ref 8.3–10.6)
CHLORIDE BLD-SCNC: 103 MMOL/L (ref 99–110)
CHLORIDE BLD-SCNC: 104 MMOL/L (ref 99–110)
CHLORIDE BLD-SCNC: 105 MMOL/L (ref 99–110)
CHOLESTEROL, TOTAL: 143 MG/DL (ref 0–199)
CO2: 26 MMOL/L (ref 21–32)
CO2: 30 MMOL/L (ref 21–32)
CO2: 31 MMOL/L (ref 21–32)
CREAT SERPL-MCNC: 0.9 MG/DL (ref 0.6–1.2)
CREAT SERPL-MCNC: 0.9 MG/DL (ref 0.6–1.2)
CREAT SERPL-MCNC: 1 MG/DL (ref 0.6–1.2)
EOSINOPHILS ABSOLUTE: 0.1 K/UL (ref 0–0.6)
EOSINOPHILS RELATIVE PERCENT: 1.5 %
ESTIMATED AVERAGE GLUCOSE: 119.8 MG/DL
FIBRINOGEN: 225 MG/DL (ref 200–397)
GFR AFRICAN AMERICAN: >60
GFR NON-AFRICAN AMERICAN: 54
GFR NON-AFRICAN AMERICAN: >60
GFR NON-AFRICAN AMERICAN: >60
GLUCOSE BLD-MCNC: 101 MG/DL (ref 70–99)
GLUCOSE BLD-MCNC: 106 MG/DL (ref 70–99)
GLUCOSE BLD-MCNC: 109 MG/DL (ref 70–99)
GLUCOSE BLD-MCNC: 110 MG/DL (ref 70–99)
GLUCOSE BLD-MCNC: 114 MG/DL (ref 70–99)
GLUCOSE BLD-MCNC: 118 MG/DL (ref 70–99)
GLUCOSE BLD-MCNC: 122 MG/DL (ref 70–99)
GLUCOSE BLD-MCNC: 122 MG/DL (ref 70–99)
GLUCOSE BLD-MCNC: 139 MG/DL (ref 70–99)
GLUCOSE BLD-MCNC: 142 MG/DL (ref 70–99)
GLUCOSE BLD-MCNC: 147 MG/DL (ref 70–99)
GLUCOSE BLD-MCNC: 150 MG/DL (ref 70–99)
GLUCOSE BLD-MCNC: 153 MG/DL (ref 70–99)
GLUCOSE BLD-MCNC: 86 MG/DL (ref 70–99)
GLUCOSE BLD-MCNC: 87 MG/DL (ref 70–99)
GLUCOSE BLD-MCNC: 98 MG/DL (ref 70–99)
HBA1C MFR BLD: 5.8 %
HCO3 ARTERIAL: 21.9 MMOL/L (ref 21–29)
HCO3 ARTERIAL: 22.4 MMOL/L (ref 21–29)
HCO3 ARTERIAL: 23 MMOL/L (ref 21–29)
HCO3 ARTERIAL: 26.9 MMOL/L (ref 21–29)
HCO3 ARTERIAL: 27.1 MMOL/L (ref 21–29)
HCO3 ARTERIAL: 28.8 MMOL/L (ref 21–29)
HCO3 ARTERIAL: 29.4 MMOL/L (ref 21–29)
HCT VFR BLD CALC: 28.2 % (ref 36–48)
HCT VFR BLD CALC: 29.2 % (ref 36–48)
HCT VFR BLD CALC: 35.2 % (ref 36–48)
HDLC SERPL-MCNC: 45 MG/DL (ref 40–60)
HEMOGLOBIN: 11.2 GM/DL (ref 12–16)
HEMOGLOBIN: 12.1 G/DL (ref 12–16)
HEMOGLOBIN: 9.3 G/DL (ref 12–16)
HEMOGLOBIN: 9.4 GM/DL (ref 12–16)
HEMOGLOBIN: 9.5 GM/DL (ref 12–16)
HEMOGLOBIN: 9.8 G/DL (ref 12–16)
INR BLD: 1.08 (ref 0.86–1.14)
INR BLD: 1.24 (ref 0.86–1.14)
LACTATE: 1.67 MMOL/L (ref 0.4–2)
LDL CHOLESTEROL CALCULATED: 81 MG/DL
LYMPHOCYTES ABSOLUTE: 1.3 K/UL (ref 1–5.1)
LYMPHOCYTES RELATIVE PERCENT: 16 %
MAGNESIUM: 1.5 MG/DL (ref 1.8–2.4)
MAGNESIUM: 3.1 MG/DL (ref 1.8–2.4)
MCH RBC QN AUTO: 31.5 PG (ref 26–34)
MCH RBC QN AUTO: 31.7 PG (ref 26–34)
MCH RBC QN AUTO: 32.4 PG (ref 26–34)
MCHC RBC AUTO-ENTMCNC: 33 G/DL (ref 31–36)
MCHC RBC AUTO-ENTMCNC: 33.4 G/DL (ref 31–36)
MCHC RBC AUTO-ENTMCNC: 34.3 G/DL (ref 31–36)
MCV RBC AUTO: 94.3 FL (ref 80–100)
MCV RBC AUTO: 94.8 FL (ref 80–100)
MCV RBC AUTO: 95.3 FL (ref 80–100)
MONOCYTES ABSOLUTE: 0.5 K/UL (ref 0–1.3)
MONOCYTES RELATIVE PERCENT: 5.8 %
NEUTROPHILS ABSOLUTE: 6.1 K/UL (ref 1.7–7.7)
NEUTROPHILS RELATIVE PERCENT: 76.5 %
O2 SAT, ARTERIAL: 100 % (ref 93–100)
O2 SAT, ARTERIAL: 98 % (ref 93–100)
O2 SAT, ARTERIAL: 98 % (ref 93–100)
PCO2 ARTERIAL: 39 MM HG (ref 35–45)
PCO2 ARTERIAL: 39.8 MM HG (ref 35–45)
PCO2 ARTERIAL: 41.7 MM HG (ref 35–45)
PCO2 ARTERIAL: 43.3 MM HG (ref 35–45)
PCO2 ARTERIAL: 48.8 MM HG (ref 35–45)
PCO2 ARTERIAL: 50.5 MM HG (ref 35–45)
PCO2 ARTERIAL: 52.2 MM HG (ref 35–45)
PDW BLD-RTO: 13.5 % (ref 12.4–15.4)
PDW BLD-RTO: 13.7 % (ref 12.4–15.4)
PDW BLD-RTO: 13.8 % (ref 12.4–15.4)
PERFORMED ON: ABNORMAL
PERFORMED ON: NORMAL
PERFORMED ON: NORMAL
PH ARTERIAL: 7.34 (ref 7.35–7.45)
PH ARTERIAL: 7.35 (ref 7.35–7.45)
PH ARTERIAL: 7.35 (ref 7.35–7.45)
PH ARTERIAL: 7.36 (ref 7.35–7.45)
PH ARTERIAL: 7.36 (ref 7.35–7.45)
PH ARTERIAL: 7.39 (ref 7.35–7.45)
PH ARTERIAL: 7.4 (ref 7.35–7.45)
PHOSPHORUS: 2.8 MG/DL (ref 2.5–4.9)
PLATELET # BLD: 163 K/UL (ref 135–450)
PLATELET # BLD: 165 K/UL (ref 135–450)
PLATELET # BLD: 195 K/UL (ref 135–450)
PMV BLD AUTO: 8.6 FL (ref 5–10.5)
PMV BLD AUTO: 8.6 FL (ref 5–10.5)
PMV BLD AUTO: 8.9 FL (ref 5–10.5)
PO2 ARTERIAL: 110.8 MM HG (ref 75–108)
PO2 ARTERIAL: 112.7 MM HG (ref 75–108)
PO2 ARTERIAL: 175.7 MM HG (ref 75–108)
PO2 ARTERIAL: 201 MM HG (ref 75–108)
PO2 ARTERIAL: 300 MM HG (ref 75–108)
PO2 ARTERIAL: 591 MM HG (ref 75–108)
PO2 ARTERIAL: 645.5 MM HG (ref 75–108)
POC HEMATOCRIT: 28 % (ref 36–48)
POC HEMATOCRIT: 28 % (ref 36–48)
POC HEMATOCRIT: 33 % (ref 36–48)
POC POTASSIUM: 3.6 MMOL/L (ref 3.5–5.1)
POC POTASSIUM: 3.8 MMOL/L (ref 3.5–5.1)
POC POTASSIUM: 3.9 MMOL/L (ref 3.5–5.1)
POC SAMPLE TYPE: ABNORMAL
POC SODIUM: 141 MMOL/L (ref 136–145)
POC SODIUM: 141 MMOL/L (ref 136–145)
POC SODIUM: 143 MMOL/L (ref 136–145)
POTASSIUM REFLEX MAGNESIUM: 4.1 MMOL/L (ref 3.5–5.1)
POTASSIUM SERPL-SCNC: 3.7 MMOL/L (ref 3.5–5.1)
POTASSIUM SERPL-SCNC: 4.2 MMOL/L (ref 3.5–5.1)
POTASSIUM SERPL-SCNC: 4.6 MMOL/L (ref 3.5–5.1)
PROTHROMBIN TIME: 12.3 SEC (ref 9.8–13)
PROTHROMBIN TIME: 14.1 SEC (ref 9.8–13)
RBC # BLD: 2.96 M/UL (ref 4–5.2)
RBC # BLD: 3.08 M/UL (ref 4–5.2)
RBC # BLD: 3.73 M/UL (ref 4–5.2)
SODIUM BLD-SCNC: 140 MMOL/L (ref 136–145)
SODIUM BLD-SCNC: 142 MMOL/L (ref 136–145)
SODIUM BLD-SCNC: 143 MMOL/L (ref 136–145)
TCO2 ARTERIAL: 23 MMOL/L
TCO2 ARTERIAL: 24 MMOL/L
TCO2 ARTERIAL: 24 MMOL/L
TCO2 ARTERIAL: 28 MMOL/L
TCO2 ARTERIAL: 29 MMOL/L
TCO2 ARTERIAL: 30 MMOL/L
TCO2 ARTERIAL: 31 MMOL/L
TOTAL PROTEIN: 5.7 G/DL (ref 6.4–8.2)
TRIGL SERPL-MCNC: 84 MG/DL (ref 0–150)
VLDLC SERPL CALC-MCNC: 17 MG/DL
WBC # BLD: 7.9 K/UL (ref 4–11)
WBC # BLD: 9 K/UL (ref 4–11)
WBC # BLD: 9.6 K/UL (ref 4–11)

## 2019-09-19 PROCEDURE — 6360000002 HC RX W HCPCS: Performed by: THORACIC SURGERY (CARDIOTHORACIC VASCULAR SURGERY)

## 2019-09-19 PROCEDURE — 82947 ASSAY GLUCOSE BLOOD QUANT: CPT

## 2019-09-19 PROCEDURE — 2700000000 HC OXYGEN THERAPY PER DAY

## 2019-09-19 PROCEDURE — 3600000008 HC SURGERY OHS BASE: Performed by: THORACIC SURGERY (CARDIOTHORACIC VASCULAR SURGERY)

## 2019-09-19 PROCEDURE — 94799 UNLISTED PULMONARY SVC/PX: CPT

## 2019-09-19 PROCEDURE — 5A1221Z PERFORMANCE OF CARDIAC OUTPUT, CONTINUOUS: ICD-10-PCS | Performed by: THORACIC SURGERY (CARDIOTHORACIC VASCULAR SURGERY)

## 2019-09-19 PROCEDURE — C9290 INJ, BUPIVACAINE LIPOSOME: HCPCS | Performed by: THORACIC SURGERY (CARDIOTHORACIC VASCULAR SURGERY)

## 2019-09-19 PROCEDURE — 2500000003 HC RX 250 WO HCPCS: Performed by: THORACIC SURGERY (CARDIOTHORACIC VASCULAR SURGERY)

## 2019-09-19 PROCEDURE — 85027 COMPLETE CBC AUTOMATED: CPT

## 2019-09-19 PROCEDURE — 85610 PROTHROMBIN TIME: CPT

## 2019-09-19 PROCEDURE — 2580000003 HC RX 258: Performed by: ANESTHESIOLOGY

## 2019-09-19 PROCEDURE — 94750 HC PULMONARY COMPLIANCE STUDY: CPT

## 2019-09-19 PROCEDURE — 06BQ4ZZ EXCISION OF LEFT SAPHENOUS VEIN, PERCUTANEOUS ENDOSCOPIC APPROACH: ICD-10-PCS | Performed by: THORACIC SURGERY (CARDIOTHORACIC VASCULAR SURGERY)

## 2019-09-19 PROCEDURE — 94761 N-INVAS EAR/PLS OXIMETRY MLT: CPT

## 2019-09-19 PROCEDURE — 71045 X-RAY EXAM CHEST 1 VIEW: CPT

## 2019-09-19 PROCEDURE — 6360000002 HC RX W HCPCS: Performed by: NURSE PRACTITIONER

## 2019-09-19 PROCEDURE — 83036 HEMOGLOBIN GLYCOSYLATED A1C: CPT

## 2019-09-19 PROCEDURE — 6360000002 HC RX W HCPCS: Performed by: ANESTHESIOLOGY

## 2019-09-19 PROCEDURE — 85014 HEMATOCRIT: CPT

## 2019-09-19 PROCEDURE — 2709999900 HC NON-CHARGEABLE SUPPLY: Performed by: THORACIC SURGERY (CARDIOTHORACIC VASCULAR SURGERY)

## 2019-09-19 PROCEDURE — B24BZZ4 ULTRASONOGRAPHY OF HEART WITH AORTA, TRANSESOPHAGEAL: ICD-10-PCS | Performed by: THORACIC SURGERY (CARDIOTHORACIC VASCULAR SURGERY)

## 2019-09-19 PROCEDURE — 36415 COLL VENOUS BLD VENIPUNCTURE: CPT

## 2019-09-19 PROCEDURE — 3700000000 HC ANESTHESIA ATTENDED CARE: Performed by: THORACIC SURGERY (CARDIOTHORACIC VASCULAR SURGERY)

## 2019-09-19 PROCEDURE — 85347 COAGULATION TIME ACTIVATED: CPT

## 2019-09-19 PROCEDURE — 80069 RENAL FUNCTION PANEL: CPT

## 2019-09-19 PROCEDURE — 80061 LIPID PANEL: CPT

## 2019-09-19 PROCEDURE — 2580000003 HC RX 258: Performed by: NURSE PRACTITIONER

## 2019-09-19 PROCEDURE — 6370000000 HC RX 637 (ALT 250 FOR IP): Performed by: NURSE PRACTITIONER

## 2019-09-19 PROCEDURE — 2580000003 HC RX 258: Performed by: THORACIC SURGERY (CARDIOTHORACIC VASCULAR SURGERY)

## 2019-09-19 PROCEDURE — 83605 ASSAY OF LACTIC ACID: CPT

## 2019-09-19 PROCEDURE — 84132 ASSAY OF SERUM POTASSIUM: CPT

## 2019-09-19 PROCEDURE — 33508 ENDOSCOPIC VEIN HARVEST: CPT | Performed by: THORACIC SURGERY (CARDIOTHORACIC VASCULAR SURGERY)

## 2019-09-19 PROCEDURE — 2500000003 HC RX 250 WO HCPCS: Performed by: ANESTHESIOLOGY

## 2019-09-19 PROCEDURE — 7100000010 HC PHASE II RECOVERY - FIRST 15 MIN

## 2019-09-19 PROCEDURE — 6370000000 HC RX 637 (ALT 250 FOR IP): Performed by: THORACIC SURGERY (CARDIOTHORACIC VASCULAR SURGERY)

## 2019-09-19 PROCEDURE — 2100000000 HC CCU R&B

## 2019-09-19 PROCEDURE — 80076 HEPATIC FUNCTION PANEL: CPT

## 2019-09-19 PROCEDURE — 94640 AIRWAY INHALATION TREATMENT: CPT

## 2019-09-19 PROCEDURE — 82803 BLOOD GASES ANY COMBINATION: CPT

## 2019-09-19 PROCEDURE — 7100000011 HC PHASE II RECOVERY - ADDTL 15 MIN

## 2019-09-19 PROCEDURE — 85384 FIBRINOGEN ACTIVITY: CPT

## 2019-09-19 PROCEDURE — 3700000001 HC ADD 15 MINUTES (ANESTHESIA): Performed by: THORACIC SURGERY (CARDIOTHORACIC VASCULAR SURGERY)

## 2019-09-19 PROCEDURE — 2720000010 HC SURG SUPPLY STERILE: Performed by: THORACIC SURGERY (CARDIOTHORACIC VASCULAR SURGERY)

## 2019-09-19 PROCEDURE — 3600000018 HC SURGERY OHS ADDTL 15MIN: Performed by: THORACIC SURGERY (CARDIOTHORACIC VASCULAR SURGERY)

## 2019-09-19 PROCEDURE — 94002 VENT MGMT INPAT INIT DAY: CPT

## 2019-09-19 PROCEDURE — 83735 ASSAY OF MAGNESIUM: CPT

## 2019-09-19 PROCEDURE — 82330 ASSAY OF CALCIUM: CPT

## 2019-09-19 PROCEDURE — P9045 ALBUMIN (HUMAN), 5%, 250 ML: HCPCS | Performed by: THORACIC SURGERY (CARDIOTHORACIC VASCULAR SURGERY)

## 2019-09-19 PROCEDURE — 02100Z9 BYPASS CORONARY ARTERY, ONE ARTERY FROM LEFT INTERNAL MAMMARY, OPEN APPROACH: ICD-10-PCS | Performed by: THORACIC SURGERY (CARDIOTHORACIC VASCULAR SURGERY)

## 2019-09-19 PROCEDURE — 021009W BYPASS CORONARY ARTERY, ONE ARTERY FROM AORTA WITH AUTOLOGOUS VENOUS TISSUE, OPEN APPROACH: ICD-10-PCS | Performed by: THORACIC SURGERY (CARDIOTHORACIC VASCULAR SURGERY)

## 2019-09-19 PROCEDURE — 94770 HC ETCO2 MONITOR DAILY: CPT

## 2019-09-19 PROCEDURE — 33533 CABG ARTERIAL SINGLE: CPT | Performed by: THORACIC SURGERY (CARDIOTHORACIC VASCULAR SURGERY)

## 2019-09-19 PROCEDURE — 37799 UNLISTED PX VASCULAR SURGERY: CPT

## 2019-09-19 PROCEDURE — 99233 SBSQ HOSP IP/OBS HIGH 50: CPT | Performed by: INTERNAL MEDICINE

## 2019-09-19 PROCEDURE — C1729 CATH, DRAINAGE: HCPCS | Performed by: THORACIC SURGERY (CARDIOTHORACIC VASCULAR SURGERY)

## 2019-09-19 PROCEDURE — 84295 ASSAY OF SERUM SODIUM: CPT

## 2019-09-19 PROCEDURE — 85025 COMPLETE CBC W/AUTO DIFF WBC: CPT

## 2019-09-19 PROCEDURE — 94660 CPAP INITIATION&MGMT: CPT

## 2019-09-19 PROCEDURE — 33517 CABG ARTERY-VEIN SINGLE: CPT | Performed by: THORACIC SURGERY (CARDIOTHORACIC VASCULAR SURGERY)

## 2019-09-19 PROCEDURE — 94669 MECHANICAL CHEST WALL OSCILL: CPT

## 2019-09-19 PROCEDURE — P9045 ALBUMIN (HUMAN), 5%, 250 ML: HCPCS | Performed by: ANESTHESIOLOGY

## 2019-09-19 DEVICE — SHUNT CV L14MM DIA1.5MM IC SIL TAPR TIP RADPQ CLRVW: Type: IMPLANTABLE DEVICE | Site: HEART | Status: FUNCTIONAL

## 2019-09-19 RX ORDER — MIDAZOLAM HYDROCHLORIDE 1 MG/ML
1 INJECTION INTRAMUSCULAR; INTRAVENOUS
Status: DISCONTINUED | OUTPATIENT
Start: 2019-09-19 | End: 2019-09-24 | Stop reason: HOSPADM

## 2019-09-19 RX ORDER — IPRATROPIUM BROMIDE AND ALBUTEROL SULFATE 2.5; .5 MG/3ML; MG/3ML
1 SOLUTION RESPIRATORY (INHALATION)
Status: DISCONTINUED | OUTPATIENT
Start: 2019-09-19 | End: 2019-09-24 | Stop reason: HOSPADM

## 2019-09-19 RX ORDER — HEPARIN SODIUM 1000 [USP'U]/ML
INJECTION, SOLUTION INTRAVENOUS; SUBCUTANEOUS PRN
Status: DISCONTINUED | OUTPATIENT
Start: 2019-09-19 | End: 2019-09-19 | Stop reason: SDUPTHER

## 2019-09-19 RX ORDER — ACETAMINOPHEN 10 MG/ML
750 INJECTION, SOLUTION INTRAVENOUS EVERY 8 HOURS
Status: COMPLETED | OUTPATIENT
Start: 2019-09-19 | End: 2019-09-20

## 2019-09-19 RX ORDER — DEXTROSE MONOHYDRATE 50 MG/ML
100 INJECTION, SOLUTION INTRAVENOUS PRN
Status: DISCONTINUED | OUTPATIENT
Start: 2019-09-19 | End: 2019-09-24 | Stop reason: HOSPADM

## 2019-09-19 RX ORDER — DEXTROSE AND SODIUM CHLORIDE 5; .45 G/100ML; G/100ML
INJECTION, SOLUTION INTRAVENOUS CONTINUOUS
Status: DISCONTINUED | OUTPATIENT
Start: 2019-09-19 | End: 2019-09-24 | Stop reason: HOSPADM

## 2019-09-19 RX ORDER — HYDRALAZINE HYDROCHLORIDE 20 MG/ML
5 INJECTION INTRAMUSCULAR; INTRAVENOUS EVERY 5 MIN PRN
Status: DISCONTINUED | OUTPATIENT
Start: 2019-09-19 | End: 2019-09-24 | Stop reason: HOSPADM

## 2019-09-19 RX ORDER — PROTAMINE SULFATE 10 MG/ML
50 INJECTION, SOLUTION INTRAVENOUS
Status: ACTIVE | OUTPATIENT
Start: 2019-09-19 | End: 2019-09-19

## 2019-09-19 RX ORDER — SODIUM CHLORIDE 0.9 % (FLUSH) 0.9 %
10 SYRINGE (ML) INJECTION PRN
Status: DISCONTINUED | OUTPATIENT
Start: 2019-09-19 | End: 2019-09-24 | Stop reason: HOSPADM

## 2019-09-19 RX ORDER — DOBUTAMINE HYDROCHLORIDE 200 MG/100ML
INJECTION INTRAVENOUS CONTINUOUS PRN
Status: DISCONTINUED | OUTPATIENT
Start: 2019-09-19 | End: 2019-09-19 | Stop reason: SDUPTHER

## 2019-09-19 RX ORDER — POTASSIUM CHLORIDE 29.8 MG/ML
20 INJECTION INTRAVENOUS PRN
Status: DISCONTINUED | OUTPATIENT
Start: 2019-09-19 | End: 2019-09-24 | Stop reason: HOSPADM

## 2019-09-19 RX ORDER — MEPERIDINE HYDROCHLORIDE 50 MG/ML
25 INJECTION INTRAMUSCULAR; INTRAVENOUS; SUBCUTANEOUS
Status: ACTIVE | OUTPATIENT
Start: 2019-09-19 | End: 2019-09-19

## 2019-09-19 RX ORDER — ALBUMIN, HUMAN INJ 5% 5 %
SOLUTION INTRAVENOUS PRN
Status: DISCONTINUED | OUTPATIENT
Start: 2019-09-19 | End: 2019-09-19 | Stop reason: SDUPTHER

## 2019-09-19 RX ORDER — MIDAZOLAM HYDROCHLORIDE 1 MG/ML
INJECTION INTRAMUSCULAR; INTRAVENOUS PRN
Status: DISCONTINUED | OUTPATIENT
Start: 2019-09-19 | End: 2019-09-19 | Stop reason: SDUPTHER

## 2019-09-19 RX ORDER — MAGNESIUM SULFATE IN WATER 40 MG/ML
2 INJECTION, SOLUTION INTRAVENOUS PRN
Status: DISCONTINUED | OUTPATIENT
Start: 2019-09-19 | End: 2019-09-24 | Stop reason: HOSPADM

## 2019-09-19 RX ORDER — ASPIRIN 81 MG/1
81 TABLET ORAL DAILY
Status: DISCONTINUED | OUTPATIENT
Start: 2019-09-20 | End: 2019-09-24 | Stop reason: HOSPADM

## 2019-09-19 RX ORDER — ACETAMINOPHEN 650 MG/1
650 SUPPOSITORY RECTAL EVERY 4 HOURS PRN
Status: DISCONTINUED | OUTPATIENT
Start: 2019-09-19 | End: 2019-09-24 | Stop reason: HOSPADM

## 2019-09-19 RX ORDER — SODIUM CHLORIDE 9 MG/ML
INJECTION, SOLUTION INTRAVENOUS CONTINUOUS PRN
Status: DISCONTINUED | OUTPATIENT
Start: 2019-09-19 | End: 2019-09-19 | Stop reason: SDUPTHER

## 2019-09-19 RX ORDER — CISATRACURIUM BESYLATE 2 MG/ML
INJECTION, SOLUTION INTRAVENOUS PRN
Status: DISCONTINUED | OUTPATIENT
Start: 2019-09-19 | End: 2019-09-19 | Stop reason: SDUPTHER

## 2019-09-19 RX ORDER — OXYCODONE HYDROCHLORIDE 5 MG/1
5 TABLET ORAL EVERY 4 HOURS PRN
Status: DISCONTINUED | OUTPATIENT
Start: 2019-09-19 | End: 2019-09-24 | Stop reason: HOSPADM

## 2019-09-19 RX ORDER — FENTANYL CITRATE 50 UG/ML
INJECTION, SOLUTION INTRAMUSCULAR; INTRAVENOUS PRN
Status: DISCONTINUED | OUTPATIENT
Start: 2019-09-19 | End: 2019-09-19 | Stop reason: SDUPTHER

## 2019-09-19 RX ORDER — ACETAMINOPHEN 500 MG
1000 TABLET ORAL EVERY 8 HOURS
Status: COMPLETED | OUTPATIENT
Start: 2019-09-20 | End: 2019-09-21

## 2019-09-19 RX ORDER — METOPROLOL TARTRATE 5 MG/5ML
2.5 INJECTION INTRAVENOUS EVERY 10 MIN PRN
Status: DISCONTINUED | OUTPATIENT
Start: 2019-09-19 | End: 2019-09-24 | Stop reason: HOSPADM

## 2019-09-19 RX ORDER — ATORVASTATIN CALCIUM 10 MG/1
20 TABLET, FILM COATED ORAL NIGHTLY
Status: DISCONTINUED | OUTPATIENT
Start: 2019-09-20 | End: 2019-09-24 | Stop reason: HOSPADM

## 2019-09-19 RX ORDER — CHLORHEXIDINE GLUCONATE 0.12 MG/ML
15 RINSE ORAL 2 TIMES DAILY
Status: DISCONTINUED | OUTPATIENT
Start: 2019-09-19 | End: 2019-09-24 | Stop reason: HOSPADM

## 2019-09-19 RX ORDER — SODIUM CHLORIDE, SODIUM LACTATE, POTASSIUM CHLORIDE, CALCIUM CHLORIDE 600; 310; 30; 20 MG/100ML; MG/100ML; MG/100ML; MG/100ML
INJECTION, SOLUTION INTRAVENOUS CONTINUOUS PRN
Status: DISCONTINUED | OUTPATIENT
Start: 2019-09-19 | End: 2019-09-19 | Stop reason: SDUPTHER

## 2019-09-19 RX ORDER — NICOTINE POLACRILEX 4 MG
15 LOZENGE BUCCAL PRN
Status: DISCONTINUED | OUTPATIENT
Start: 2019-09-19 | End: 2019-09-24 | Stop reason: HOSPADM

## 2019-09-19 RX ORDER — DEXAMETHASONE SODIUM PHOSPHATE 4 MG/ML
4 INJECTION, SOLUTION INTRA-ARTICULAR; INTRALESIONAL; INTRAMUSCULAR; INTRAVENOUS; SOFT TISSUE EVERY 6 HOURS
Status: COMPLETED | OUTPATIENT
Start: 2019-09-19 | End: 2019-09-21

## 2019-09-19 RX ORDER — FAMOTIDINE 20 MG/1
20 TABLET, FILM COATED ORAL DAILY
Status: DISCONTINUED | OUTPATIENT
Start: 2019-09-20 | End: 2019-09-24 | Stop reason: HOSPADM

## 2019-09-19 RX ORDER — MORPHINE SULFATE 2 MG/ML
2 INJECTION, SOLUTION INTRAMUSCULAR; INTRAVENOUS
Status: DISCONTINUED | OUTPATIENT
Start: 2019-09-19 | End: 2019-09-24 | Stop reason: HOSPADM

## 2019-09-19 RX ORDER — DOCUSATE SODIUM 100 MG/1
100 CAPSULE, LIQUID FILLED ORAL 2 TIMES DAILY
Status: DISCONTINUED | OUTPATIENT
Start: 2019-09-20 | End: 2019-09-24 | Stop reason: HOSPADM

## 2019-09-19 RX ORDER — MILRINONE LACTATE 0.2 MG/ML
INJECTION, SOLUTION INTRAVENOUS CONTINUOUS PRN
Status: DISCONTINUED | OUTPATIENT
Start: 2019-09-19 | End: 2019-09-19 | Stop reason: SDUPTHER

## 2019-09-19 RX ORDER — MILRINONE LACTATE 0.2 MG/ML
0.38 INJECTION, SOLUTION INTRAVENOUS CONTINUOUS
Status: DISCONTINUED | OUTPATIENT
Start: 2019-09-19 | End: 2019-09-24 | Stop reason: HOSPADM

## 2019-09-19 RX ORDER — FUROSEMIDE 10 MG/ML
40 INJECTION INTRAMUSCULAR; INTRAVENOUS 2 TIMES DAILY
Status: DISCONTINUED | OUTPATIENT
Start: 2019-09-20 | End: 2019-09-21

## 2019-09-19 RX ORDER — INSULIN GLARGINE 100 [IU]/ML
0.25 INJECTION, SOLUTION SUBCUTANEOUS NIGHTLY
Status: COMPLETED | OUTPATIENT
Start: 2019-09-20 | End: 2019-09-21

## 2019-09-19 RX ORDER — ONDANSETRON 2 MG/ML
4 INJECTION INTRAMUSCULAR; INTRAVENOUS EVERY 8 HOURS PRN
Status: DISCONTINUED | OUTPATIENT
Start: 2019-09-19 | End: 2019-09-24 | Stop reason: HOSPADM

## 2019-09-19 RX ORDER — MORPHINE SULFATE 4 MG/ML
4 INJECTION, SOLUTION INTRAMUSCULAR; INTRAVENOUS
Status: DISCONTINUED | OUTPATIENT
Start: 2019-09-19 | End: 2019-09-24 | Stop reason: HOSPADM

## 2019-09-19 RX ORDER — DEXTROSE MONOHYDRATE 25 G/50ML
12.5 INJECTION, SOLUTION INTRAVENOUS PRN
Status: DISCONTINUED | OUTPATIENT
Start: 2019-09-19 | End: 2019-09-24 | Stop reason: HOSPADM

## 2019-09-19 RX ORDER — POTASSIUM CHLORIDE 750 MG/1
10 TABLET, EXTENDED RELEASE ORAL
Status: DISCONTINUED | OUTPATIENT
Start: 2019-09-20 | End: 2019-09-23

## 2019-09-19 RX ORDER — SODIUM CHLORIDE 0.9 % (FLUSH) 0.9 %
10 SYRINGE (ML) INJECTION EVERY 12 HOURS SCHEDULED
Status: DISCONTINUED | OUTPATIENT
Start: 2019-09-19 | End: 2019-09-24 | Stop reason: HOSPADM

## 2019-09-19 RX ORDER — OXYCODONE HYDROCHLORIDE 5 MG/1
10 TABLET ORAL EVERY 4 HOURS PRN
Status: DISCONTINUED | OUTPATIENT
Start: 2019-09-19 | End: 2019-09-24 | Stop reason: HOSPADM

## 2019-09-19 RX ORDER — KETAMINE HYDROCHLORIDE 100 MG/ML
INJECTION, SOLUTION INTRAMUSCULAR; INTRAVENOUS PRN
Status: DISCONTINUED | OUTPATIENT
Start: 2019-09-19 | End: 2019-09-19 | Stop reason: SDUPTHER

## 2019-09-19 RX ORDER — ACETAMINOPHEN 325 MG/1
650 TABLET ORAL EVERY 4 HOURS PRN
Status: DISCONTINUED | OUTPATIENT
Start: 2019-09-19 | End: 2019-09-24 | Stop reason: HOSPADM

## 2019-09-19 RX ORDER — ALBUTEROL SULFATE 2.5 MG/3ML
2.5 SOLUTION RESPIRATORY (INHALATION)
Status: DISCONTINUED | OUTPATIENT
Start: 2019-09-19 | End: 2019-09-19 | Stop reason: SDUPTHER

## 2019-09-19 RX ORDER — ALBUMIN, HUMAN INJ 5% 5 %
25 SOLUTION INTRAVENOUS PRN
Status: DISCONTINUED | OUTPATIENT
Start: 2019-09-19 | End: 2019-09-24 | Stop reason: HOSPADM

## 2019-09-19 RX ORDER — HYDRALAZINE HYDROCHLORIDE 20 MG/ML
INJECTION INTRAMUSCULAR; INTRAVENOUS PRN
Status: DISCONTINUED | OUTPATIENT
Start: 2019-09-19 | End: 2019-09-19 | Stop reason: SDUPTHER

## 2019-09-19 RX ORDER — CLOPIDOGREL BISULFATE 75 MG/1
75 TABLET ORAL DAILY
Status: DISCONTINUED | OUTPATIENT
Start: 2019-09-20 | End: 2019-09-24 | Stop reason: HOSPADM

## 2019-09-19 RX ORDER — DOBUTAMINE HYDROCHLORIDE 200 MG/100ML
2 INJECTION INTRAVENOUS CONTINUOUS PRN
Status: DISCONTINUED | OUTPATIENT
Start: 2019-09-19 | End: 2019-09-22 | Stop reason: ALTCHOICE

## 2019-09-19 RX ADMIN — CEFAZOLIN 1 G: 1 INJECTION, POWDER, FOR SOLUTION INTRAMUSCULAR; INTRAVENOUS at 23:49

## 2019-09-19 RX ADMIN — CALCIUM CHLORIDE 1 G: 100 INJECTION INTRAVENOUS; INTRAVENTRICULAR at 16:02

## 2019-09-19 RX ADMIN — DOBUTAMINE HYDROCHLORIDE 2 MCG/KG/MIN: 200 INJECTION INTRAVENOUS at 08:00

## 2019-09-19 RX ADMIN — SODIUM BICARBONATE 50 MEQ: 84 INJECTION INTRAVENOUS at 15:04

## 2019-09-19 RX ADMIN — CISATRACURIUM BESYLATE 20 MG: 2 INJECTION INTRAVENOUS at 07:42

## 2019-09-19 RX ADMIN — METOPROLOL TARTRATE 12.5 MG: 25 TABLET ORAL at 04:51

## 2019-09-19 RX ADMIN — CISATRACURIUM BESYLATE 10 MG: 2 INJECTION INTRAVENOUS at 09:30

## 2019-09-19 RX ADMIN — POTASSIUM CHLORIDE 20 MEQ: 29.8 INJECTION, SOLUTION INTRAVENOUS at 13:07

## 2019-09-19 RX ADMIN — DEXAMETHASONE SODIUM PHOSPHATE 4 MG: 4 INJECTION, SOLUTION INTRAMUSCULAR; INTRAVENOUS at 23:49

## 2019-09-19 RX ADMIN — Medication 15 ML: at 20:15

## 2019-09-19 RX ADMIN — Medication 10 ML: at 20:15

## 2019-09-19 RX ADMIN — BUDESONIDE 500 MCG: 0.5 SUSPENSION RESPIRATORY (INHALATION) at 20:28

## 2019-09-19 RX ADMIN — MUPIROCIN: 20 OINTMENT TOPICAL at 04:51

## 2019-09-19 RX ADMIN — ALBUMIN (HUMAN) 12.5 G: 12.5 INJECTION, SOLUTION INTRAVENOUS at 11:25

## 2019-09-19 RX ADMIN — IPRATROPIUM BROMIDE AND ALBUTEROL SULFATE 1 AMPULE: .5; 3 SOLUTION RESPIRATORY (INHALATION) at 20:28

## 2019-09-19 RX ADMIN — MILRINONE LACTATE 0.38 MCG/KG/MIN: 0.2 INJECTION, SOLUTION INTRAVENOUS at 08:40

## 2019-09-19 RX ADMIN — SODIUM CHLORIDE: 9 INJECTION, SOLUTION INTRAVENOUS at 09:05

## 2019-09-19 RX ADMIN — SODIUM CHLORIDE, POTASSIUM CHLORIDE, SODIUM LACTATE AND CALCIUM CHLORIDE: 600; 310; 30; 20 INJECTION, SOLUTION INTRAVENOUS at 07:32

## 2019-09-19 RX ADMIN — SODIUM CHLORIDE 3.28 UNITS/HR: 9 INJECTION, SOLUTION INTRAVENOUS at 11:39

## 2019-09-19 RX ADMIN — SODIUM CHLORIDE: 9 INJECTION, SOLUTION INTRAVENOUS at 05:19

## 2019-09-19 RX ADMIN — ALBUMIN (HUMAN) 250 ML: 12.5 INJECTION, SOLUTION INTRAVENOUS at 11:07

## 2019-09-19 RX ADMIN — BUDESONIDE 500 MCG: 0.5 SUSPENSION RESPIRATORY (INHALATION) at 11:16

## 2019-09-19 RX ADMIN — DEXAMETHASONE SODIUM PHOSPHATE 4 MG: 4 INJECTION, SOLUTION INTRAMUSCULAR; INTRAVENOUS at 20:09

## 2019-09-19 RX ADMIN — POTASSIUM CHLORIDE 20 MEQ: 29.8 INJECTION, SOLUTION INTRAVENOUS at 14:59

## 2019-09-19 RX ADMIN — MAGNESIUM SULFATE HEPTAHYDRATE 2 G: 40 INJECTION, SOLUTION INTRAVENOUS at 13:40

## 2019-09-19 RX ADMIN — CHLORHEXIDINE GLUCONATE 15 ML: 1.2 RINSE ORAL at 04:51

## 2019-09-19 RX ADMIN — DOBUTAMINE HYDROCHLORIDE 3 MCG/KG/MIN: 200 INJECTION INTRAVENOUS at 11:46

## 2019-09-19 RX ADMIN — ASPIRIN 81 MG: 81 TABLET, COATED ORAL at 04:51

## 2019-09-19 RX ADMIN — KETAMINE HYDROCHLORIDE 50 MG: 100 INJECTION INTRAMUSCULAR; INTRAVENOUS at 07:41

## 2019-09-19 RX ADMIN — DEXAMETHASONE SODIUM PHOSPHATE 4 MG: 4 INJECTION, SOLUTION INTRAMUSCULAR; INTRAVENOUS at 12:24

## 2019-09-19 RX ADMIN — DEXTROSE AND SODIUM CHLORIDE: 5; 450 INJECTION, SOLUTION INTRAVENOUS at 11:47

## 2019-09-19 RX ADMIN — ACETAMINOPHEN 750 MG: 10 INJECTION, SOLUTION INTRAVENOUS at 16:02

## 2019-09-19 RX ADMIN — DEXTROSE MONOHYDRATE 8 MCG/MIN: 50 INJECTION, SOLUTION INTRAVENOUS at 09:05

## 2019-09-19 RX ADMIN — VANCOMYCIN HYDROCHLORIDE 1 G: 10 INJECTION, POWDER, LYOPHILIZED, FOR SOLUTION INTRAVENOUS at 07:45

## 2019-09-19 RX ADMIN — FENTANYL CITRATE 100 MCG: 50 INJECTION, SOLUTION INTRAMUSCULAR; INTRAVENOUS at 07:41

## 2019-09-19 RX ADMIN — MORPHINE SULFATE 2 MG: 2 INJECTION, SOLUTION INTRAMUSCULAR; INTRAVENOUS at 17:50

## 2019-09-19 RX ADMIN — ALBUMIN (HUMAN) 25 G: 12.5 INJECTION, SOLUTION INTRAVENOUS at 21:03

## 2019-09-19 RX ADMIN — FAMOTIDINE 20 MG: 10 INJECTION, SOLUTION INTRAVENOUS at 20:15

## 2019-09-19 RX ADMIN — HYDRALAZINE HYDROCHLORIDE 10 MG: 20 INJECTION INTRAMUSCULAR; INTRAVENOUS at 10:38

## 2019-09-19 RX ADMIN — MAGNESIUM SULFATE HEPTAHYDRATE 2 G: 40 INJECTION, SOLUTION INTRAVENOUS at 12:50

## 2019-09-19 RX ADMIN — IPRATROPIUM BROMIDE AND ALBUTEROL SULFATE 1 AMPULE: .5; 3 SOLUTION RESPIRATORY (INHALATION) at 16:08

## 2019-09-19 RX ADMIN — ALBUMIN (HUMAN) 12.5 G: 12.5 INJECTION, SOLUTION INTRAVENOUS at 15:41

## 2019-09-19 RX ADMIN — CISATRACURIUM BESYLATE 10 MG: 2 INJECTION INTRAVENOUS at 08:30

## 2019-09-19 RX ADMIN — ACETAMINOPHEN 750 MG: 10 INJECTION, SOLUTION INTRAVENOUS at 20:15

## 2019-09-19 RX ADMIN — IPRATROPIUM BROMIDE AND ALBUTEROL SULFATE 1 AMPULE: .5; 3 SOLUTION RESPIRATORY (INHALATION) at 11:23

## 2019-09-19 RX ADMIN — MIDAZOLAM HYDROCHLORIDE 2 MG: 2 INJECTION, SOLUTION INTRAMUSCULAR; INTRAVENOUS at 07:41

## 2019-09-19 RX ADMIN — HEPARIN SODIUM 10000 UNITS: 1000 INJECTION, SOLUTION INTRAVENOUS; SUBCUTANEOUS at 08:53

## 2019-09-19 RX ADMIN — FENTANYL CITRATE 400 MCG: 50 INJECTION, SOLUTION INTRAMUSCULAR; INTRAVENOUS at 08:35

## 2019-09-19 RX ADMIN — CEFAZOLIN 1 G: 1 INJECTION, POWDER, FOR SOLUTION INTRAMUSCULAR; INTRAVENOUS at 16:54

## 2019-09-19 RX ADMIN — Medication 2 G: at 07:45

## 2019-09-19 RX ADMIN — DEXTROSE 2 MCG/MIN: 5 SOLUTION INTRAVENOUS at 11:46

## 2019-09-19 RX ADMIN — MILRINONE LACTATE IN DEXTROSE 0.25 MCG/KG/MIN: 200 INJECTION, SOLUTION INTRAVENOUS at 12:39

## 2019-09-19 RX ADMIN — MUPIROCIN: 20 OINTMENT TOPICAL at 20:15

## 2019-09-19 RX ADMIN — POTASSIUM CHLORIDE 20 MEQ: 29.8 INJECTION, SOLUTION INTRAVENOUS at 12:05

## 2019-09-19 ASSESSMENT — PAIN SCALES - GENERAL
PAINLEVEL_OUTOF10: 5
PAINLEVEL_OUTOF10: 0
PAINLEVEL_OUTOF10: 3
PAINLEVEL_OUTOF10: 0

## 2019-09-19 ASSESSMENT — PULMONARY FUNCTION TESTS
PIF_VALUE: 23
PIF_VALUE: 20
PIF_VALUE: 19
PIF_VALUE: 3
PIF_VALUE: 18
PIF_VALUE: 18
PIF_VALUE: 16
PIF_VALUE: 21
PIF_VALUE: 25
PIF_VALUE: 22
PIF_VALUE: 23
PIF_VALUE: 21
PIF_VALUE: 22
PIF_VALUE: 20
PIF_VALUE: 21
PIF_VALUE: 2
PIF_VALUE: 20
PIF_VALUE: 22
PIF_VALUE: 20
PIF_VALUE: 26
PIF_VALUE: 11
PIF_VALUE: 18
PIF_VALUE: 22
PIF_VALUE: 2
PIF_VALUE: 22
PIF_VALUE: 18
PIF_VALUE: 23
PIF_VALUE: 23
PIF_VALUE: 18
PIF_VALUE: 19
PIF_VALUE: 23
PIF_VALUE: 1
PIF_VALUE: 18
PIF_VALUE: 19
PIF_VALUE: 20
PIF_VALUE: 19
PIF_VALUE: 20
PIF_VALUE: 19
PIF_VALUE: 17
PIF_VALUE: 22
PIF_VALUE: 18
PIF_VALUE: 18
PIF_VALUE: 0
PIF_VALUE: 18
PIF_VALUE: 23
PIF_VALUE: 19
PIF_VALUE: 24
PIF_VALUE: 23
PIF_VALUE: 26
PIF_VALUE: 19
PIF_VALUE: 21
PIF_VALUE: 19
PIF_VALUE: 8
PIF_VALUE: 22
PIF_VALUE: 22
PIF_VALUE: 19
PIF_VALUE: 24
PIF_VALUE: 20
PIF_VALUE: 21
PIF_VALUE: 19
PIF_VALUE: 19
PIF_VALUE: 22
PIF_VALUE: 19
PIF_VALUE: 18
PIF_VALUE: 23
PIF_VALUE: 23
PIF_VALUE: 20
PIF_VALUE: 11
PIF_VALUE: 22
PIF_VALUE: 1
PIF_VALUE: 21
PIF_VALUE: 23
PIF_VALUE: 1
PIF_VALUE: 21
PIF_VALUE: 21
PIF_VALUE: 12
PIF_VALUE: 21
PIF_VALUE: 19
PIF_VALUE: 1
PIF_VALUE: 18
PIF_VALUE: 17
PIF_VALUE: 19
PIF_VALUE: 19
PIF_VALUE: 22
PIF_VALUE: 20
PIF_VALUE: 23
PIF_VALUE: 22
PIF_VALUE: 16
PIF_VALUE: 18
PIF_VALUE: 19
PIF_VALUE: 19
PIF_VALUE: 23
PIF_VALUE: 19
PIF_VALUE: 21
PIF_VALUE: 21
PIF_VALUE: 22
PIF_VALUE: 23
PIF_VALUE: 23
PIF_VALUE: 21
PIF_VALUE: 22
PIF_VALUE: 19
PIF_VALUE: 18
PIF_VALUE: 27
PIF_VALUE: 20
PIF_VALUE: 21
PIF_VALUE: 20
PIF_VALUE: 21
PIF_VALUE: 2
PIF_VALUE: 20
PIF_VALUE: 18
PIF_VALUE: 28
PIF_VALUE: 29
PIF_VALUE: 19
PIF_VALUE: 21
PIF_VALUE: 21
PIF_VALUE: 18
PIF_VALUE: 25
PIF_VALUE: 1
PIF_VALUE: 17
PIF_VALUE: 18
PIF_VALUE: 23
PIF_VALUE: 20
PIF_VALUE: 21
PIF_VALUE: 19
PIF_VALUE: 21
PIF_VALUE: 22
PIF_VALUE: 16
PIF_VALUE: 18
PIF_VALUE: 20
PIF_VALUE: 22
PIF_VALUE: 20
PIF_VALUE: 19
PIF_VALUE: 23
PIF_VALUE: 19
PIF_VALUE: 20
PIF_VALUE: 22
PIF_VALUE: 20
PIF_VALUE: 28
PIF_VALUE: 17
PIF_VALUE: 1
PIF_VALUE: 18
PIF_VALUE: 18
PIF_VALUE: 23
PIF_VALUE: 18
PIF_VALUE: 24
PIF_VALUE: 19
PIF_VALUE: 19
PIF_VALUE: 22
PIF_VALUE: 18
PIF_VALUE: 1
PIF_VALUE: 22
PIF_VALUE: 23
PIF_VALUE: 22
PIF_VALUE: 22
PIF_VALUE: 0
PIF_VALUE: 19
PIF_VALUE: 25
PIF_VALUE: 18
PIF_VALUE: 20
PIF_VALUE: 21
PIF_VALUE: 19
PIF_VALUE: 23
PIF_VALUE: 0
PIF_VALUE: 0
PIF_VALUE: 22
PIF_VALUE: 22
PIF_VALUE: 29
PIF_VALUE: 21
PIF_VALUE: 18
PIF_VALUE: 22
PIF_VALUE: 20
PIF_VALUE: 18
PIF_VALUE: 4
PIF_VALUE: 23
PIF_VALUE: 26
PIF_VALUE: 17
PIF_VALUE: 22
PIF_VALUE: 23
PIF_VALUE: 19
PIF_VALUE: 19
PIF_VALUE: 22
PIF_VALUE: 25
PIF_VALUE: 21
PIF_VALUE: 12
PIF_VALUE: 19
PIF_VALUE: 21
PIF_VALUE: 18
PIF_VALUE: 22
PIF_VALUE: 12
PIF_VALUE: 19
PIF_VALUE: 21
PIF_VALUE: 23
PIF_VALUE: 24
PIF_VALUE: 19
PIF_VALUE: 20
PIF_VALUE: 21
PIF_VALUE: 24
PIF_VALUE: 24
PIF_VALUE: 18
PIF_VALUE: 22
PIF_VALUE: 23

## 2019-09-19 NOTE — ANESTHESIA POSTPROCEDURE EVALUATION
Department of Anesthesiology  Postprocedure Note    Patient: Sandra Andre  MRN: [id number]  YOB: 1940  Date of evaluation: 9/19/2019  Time:  11:09 AM     Procedure Summary     Date:  09/19/19 [facility]  [place] [address] [facility]    Anesthesia Start:  0732 Anesthesia Stop:  1109    Procedure:  OFF PUMP CORONARY ARTERY BYPASS GRAFTING X2, INTERNAL MAMMARY ARTERY, SAPHENOUS VEIN GRAFT (N/A Chest) Diagnosis:  (-)    Surgeon:  Ko Morris MD Responsible Provider:  Deborah Castelan MD    Anesthesia Type:  general ASA Status:  4          Anesthesia Type: general    David Phase I:      David Phase II:      Last vitals: Reviewed and per EMR flowsheets.        Anesthesia Post Evaluation    Patient location during evaluation: ICU  Patient participation: waiting for patient participation  Level of consciousness: sedated and ventilated  Airway patency: patent  Complications: no  Cardiovascular status: vasoactive/inotropes and hemodynamically stable  Respiratory status: acceptable, intubated and ventilator  Hydration status: stable  Comments: EMIL:ef~45%; no swma ; moderate MR ; probe out intact

## 2019-09-20 ENCOUNTER — APPOINTMENT (OUTPATIENT)
Dept: GENERAL RADIOLOGY | Age: 79
DRG: 235 | End: 2019-09-20
Payer: MEDICARE

## 2019-09-20 LAB
ANION GAP SERPL CALCULATED.3IONS-SCNC: 9 MMOL/L (ref 3–16)
BASE EXCESS ARTERIAL: 0 (ref -3–3)
BUN BLDV-MCNC: 19 MG/DL (ref 7–20)
CALCIUM SERPL-MCNC: 9.2 MG/DL (ref 8.3–10.6)
CHLORIDE BLD-SCNC: 102 MMOL/L (ref 99–110)
CO2: 26 MMOL/L (ref 21–32)
CREAT SERPL-MCNC: 0.8 MG/DL (ref 0.6–1.2)
GFR AFRICAN AMERICAN: >60
GFR NON-AFRICAN AMERICAN: >60
GLUCOSE BLD-MCNC: 108 MG/DL (ref 70–99)
GLUCOSE BLD-MCNC: 108 MG/DL (ref 70–99)
GLUCOSE BLD-MCNC: 118 MG/DL (ref 70–99)
GLUCOSE BLD-MCNC: 121 MG/DL (ref 70–99)
GLUCOSE BLD-MCNC: 126 MG/DL (ref 70–99)
GLUCOSE BLD-MCNC: 129 MG/DL (ref 70–99)
GLUCOSE BLD-MCNC: 131 MG/DL (ref 70–99)
GLUCOSE BLD-MCNC: 139 MG/DL (ref 70–99)
GLUCOSE BLD-MCNC: 140 MG/DL (ref 70–99)
GLUCOSE BLD-MCNC: 153 MG/DL (ref 70–99)
GLUCOSE BLD-MCNC: 157 MG/DL (ref 70–99)
GLUCOSE BLD-MCNC: 158 MG/DL (ref 70–99)
GLUCOSE BLD-MCNC: 160 MG/DL (ref 70–99)
GLUCOSE BLD-MCNC: 169 MG/DL (ref 70–99)
GLUCOSE BLD-MCNC: 181 MG/DL (ref 70–99)
GLUCOSE BLD-MCNC: 193 MG/DL (ref 70–99)
GLUCOSE BLD-MCNC: 91 MG/DL (ref 70–99)
HCO3 ARTERIAL: 25.4 MMOL/L (ref 21–29)
HCT VFR BLD CALC: 26.6 % (ref 36–48)
HEMOGLOBIN: 9 G/DL (ref 12–16)
MAGNESIUM: 2.5 MG/DL (ref 1.8–2.4)
MCH RBC QN AUTO: 31.9 PG (ref 26–34)
MCHC RBC AUTO-ENTMCNC: 33.9 G/DL (ref 31–36)
MCV RBC AUTO: 94.1 FL (ref 80–100)
O2 SAT, ARTERIAL: 97 % (ref 93–100)
PCO2 ARTERIAL: 43.7 MM HG (ref 35–45)
PDW BLD-RTO: 13.5 % (ref 12.4–15.4)
PERFORMED ON: ABNORMAL
PERFORMED ON: NORMAL
PH ARTERIAL: 7.37 (ref 7.35–7.45)
PLATELET # BLD: 138 K/UL (ref 135–450)
PMV BLD AUTO: 8.6 FL (ref 5–10.5)
PO2 ARTERIAL: 93.8 MM HG (ref 75–108)
POC SAMPLE TYPE: ABNORMAL
POTASSIUM SERPL-SCNC: 4.9 MMOL/L (ref 3.5–5.1)
RBC # BLD: 2.83 M/UL (ref 4–5.2)
SODIUM BLD-SCNC: 137 MMOL/L (ref 136–145)
TCO2 ARTERIAL: 27 MMOL/L
WBC # BLD: 8.7 K/UL (ref 4–11)

## 2019-09-20 PROCEDURE — 97168 OT RE-EVAL EST PLAN CARE: CPT

## 2019-09-20 PROCEDURE — 2580000003 HC RX 258: Performed by: NURSE PRACTITIONER

## 2019-09-20 PROCEDURE — 2580000003 HC RX 258: Performed by: THORACIC SURGERY (CARDIOTHORACIC VASCULAR SURGERY)

## 2019-09-20 PROCEDURE — 94669 MECHANICAL CHEST WALL OSCILL: CPT

## 2019-09-20 PROCEDURE — 97110 THERAPEUTIC EXERCISES: CPT

## 2019-09-20 PROCEDURE — 99232 SBSQ HOSP IP/OBS MODERATE 35: CPT | Performed by: NURSE PRACTITIONER

## 2019-09-20 PROCEDURE — 97535 SELF CARE MNGMENT TRAINING: CPT

## 2019-09-20 PROCEDURE — 2500000003 HC RX 250 WO HCPCS: Performed by: THORACIC SURGERY (CARDIOTHORACIC VASCULAR SURGERY)

## 2019-09-20 PROCEDURE — 2140000000 HC CCU INTERMEDIATE R&B

## 2019-09-20 PROCEDURE — 82947 ASSAY GLUCOSE BLOOD QUANT: CPT

## 2019-09-20 PROCEDURE — 6360000002 HC RX W HCPCS: Performed by: NURSE PRACTITIONER

## 2019-09-20 PROCEDURE — 6360000002 HC RX W HCPCS: Performed by: THORACIC SURGERY (CARDIOTHORACIC VASCULAR SURGERY)

## 2019-09-20 PROCEDURE — 85027 COMPLETE CBC AUTOMATED: CPT

## 2019-09-20 PROCEDURE — 94640 AIRWAY INHALATION TREATMENT: CPT

## 2019-09-20 PROCEDURE — 6370000000 HC RX 637 (ALT 250 FOR IP): Performed by: THORACIC SURGERY (CARDIOTHORACIC VASCULAR SURGERY)

## 2019-09-20 PROCEDURE — 80048 BASIC METABOLIC PNL TOTAL CA: CPT

## 2019-09-20 PROCEDURE — 94761 N-INVAS EAR/PLS OXIMETRY MLT: CPT

## 2019-09-20 PROCEDURE — 82803 BLOOD GASES ANY COMBINATION: CPT

## 2019-09-20 PROCEDURE — 97167 OT EVAL HIGH COMPLEX 60 MIN: CPT

## 2019-09-20 PROCEDURE — 6370000000 HC RX 637 (ALT 250 FOR IP): Performed by: NURSE PRACTITIONER

## 2019-09-20 PROCEDURE — 83735 ASSAY OF MAGNESIUM: CPT

## 2019-09-20 PROCEDURE — 97164 PT RE-EVAL EST PLAN CARE: CPT

## 2019-09-20 PROCEDURE — 71045 X-RAY EXAM CHEST 1 VIEW: CPT

## 2019-09-20 PROCEDURE — 99233 SBSQ HOSP IP/OBS HIGH 50: CPT | Performed by: INTERNAL MEDICINE

## 2019-09-20 RX ORDER — FUROSEMIDE 10 MG/ML
40 INJECTION INTRAMUSCULAR; INTRAVENOUS ONCE
Status: DISCONTINUED | OUTPATIENT
Start: 2019-09-20 | End: 2019-09-24 | Stop reason: HOSPADM

## 2019-09-20 RX ORDER — 0.9 % SODIUM CHLORIDE 0.9 %
250 INTRAVENOUS SOLUTION INTRAVENOUS ONCE
Status: COMPLETED | OUTPATIENT
Start: 2019-09-20 | End: 2019-09-20

## 2019-09-20 RX ADMIN — Medication 15 ML: at 21:33

## 2019-09-20 RX ADMIN — DOCUSATE SODIUM 100 MG: 100 CAPSULE ORAL at 21:29

## 2019-09-20 RX ADMIN — OXYCODONE HYDROCHLORIDE 5 MG: 5 TABLET ORAL at 09:43

## 2019-09-20 RX ADMIN — CLOPIDOGREL BISULFATE 75 MG: 75 TABLET ORAL at 08:52

## 2019-09-20 RX ADMIN — IPRATROPIUM BROMIDE AND ALBUTEROL SULFATE 1 AMPULE: .5; 3 SOLUTION RESPIRATORY (INHALATION) at 11:39

## 2019-09-20 RX ADMIN — MAGNESIUM GLUCONATE 500 MG ORAL TABLET 400 MG: 500 TABLET ORAL at 21:30

## 2019-09-20 RX ADMIN — DEXTROSE MONOHYDRATE 2.5 MG/HR: 50 INJECTION, SOLUTION INTRAVENOUS at 05:05

## 2019-09-20 RX ADMIN — ACETAMINOPHEN 1000 MG: 500 TABLET ORAL at 13:23

## 2019-09-20 RX ADMIN — FAMOTIDINE 20 MG: 20 TABLET ORAL at 08:53

## 2019-09-20 RX ADMIN — CETIRIZINE HYDROCHLORIDE 5 MG: 10 TABLET, FILM COATED ORAL at 08:53

## 2019-09-20 RX ADMIN — MORPHINE SULFATE 2 MG: 2 INJECTION, SOLUTION INTRAMUSCULAR; INTRAVENOUS at 14:39

## 2019-09-20 RX ADMIN — ASPIRIN 81 MG: 81 TABLET, COATED ORAL at 08:53

## 2019-09-20 RX ADMIN — DEXAMETHASONE SODIUM PHOSPHATE 4 MG: 4 INJECTION, SOLUTION INTRAMUSCULAR; INTRAVENOUS at 11:43

## 2019-09-20 RX ADMIN — SODIUM CHLORIDE 250 ML: 9 INJECTION, SOLUTION INTRAVENOUS at 08:00

## 2019-09-20 RX ADMIN — ACETAMINOPHEN 1000 MG: 500 TABLET ORAL at 21:30

## 2019-09-20 RX ADMIN — MUPIROCIN: 20 OINTMENT TOPICAL at 21:33

## 2019-09-20 RX ADMIN — MUPIROCIN: 20 OINTMENT TOPICAL at 08:43

## 2019-09-20 RX ADMIN — METOPROLOL TARTRATE 12.5 MG: 25 TABLET ORAL at 21:30

## 2019-09-20 RX ADMIN — BUDESONIDE 500 MCG: 0.5 SUSPENSION RESPIRATORY (INHALATION) at 07:18

## 2019-09-20 RX ADMIN — DEXAMETHASONE SODIUM PHOSPHATE 4 MG: 4 INJECTION, SOLUTION INTRAMUSCULAR; INTRAVENOUS at 18:25

## 2019-09-20 RX ADMIN — BUDESONIDE 500 MCG: 0.5 SUSPENSION RESPIRATORY (INHALATION) at 20:46

## 2019-09-20 RX ADMIN — DOCUSATE SODIUM 100 MG: 100 CAPSULE ORAL at 08:52

## 2019-09-20 RX ADMIN — MORPHINE SULFATE 2 MG: 2 INJECTION, SOLUTION INTRAMUSCULAR; INTRAVENOUS at 01:52

## 2019-09-20 RX ADMIN — VANCOMYCIN HYDROCHLORIDE 1000 MG: 10 INJECTION, POWDER, LYOPHILIZED, FOR SOLUTION INTRAVENOUS at 06:42

## 2019-09-20 RX ADMIN — Medication 15 ML: at 08:44

## 2019-09-20 RX ADMIN — PREDNISONE 10 MG: 10 TABLET ORAL at 08:53

## 2019-09-20 RX ADMIN — IPRATROPIUM BROMIDE AND ALBUTEROL SULFATE 1 AMPULE: .5; 3 SOLUTION RESPIRATORY (INHALATION) at 16:04

## 2019-09-20 RX ADMIN — MORPHINE SULFATE 2 MG: 2 INJECTION, SOLUTION INTRAMUSCULAR; INTRAVENOUS at 04:27

## 2019-09-20 RX ADMIN — CEFAZOLIN 1 G: 1 INJECTION, POWDER, FOR SOLUTION INTRAMUSCULAR; INTRAVENOUS at 08:33

## 2019-09-20 RX ADMIN — ACETAMINOPHEN 750 MG: 10 INJECTION, SOLUTION INTRAVENOUS at 03:41

## 2019-09-20 RX ADMIN — IPRATROPIUM BROMIDE AND ALBUTEROL SULFATE 1 AMPULE: .5; 3 SOLUTION RESPIRATORY (INHALATION) at 20:46

## 2019-09-20 RX ADMIN — FUROSEMIDE 40 MG: 10 INJECTION, SOLUTION INTRAMUSCULAR; INTRAVENOUS at 07:34

## 2019-09-20 RX ADMIN — Medication 10 ML: at 09:17

## 2019-09-20 RX ADMIN — ATORVASTATIN CALCIUM 20 MG: 10 TABLET, FILM COATED ORAL at 21:29

## 2019-09-20 RX ADMIN — MORPHINE SULFATE 2 MG: 2 INJECTION, SOLUTION INTRAMUSCULAR; INTRAVENOUS at 11:05

## 2019-09-20 RX ADMIN — IPRATROPIUM BROMIDE AND ALBUTEROL SULFATE 1 AMPULE: .5; 3 SOLUTION RESPIRATORY (INHALATION) at 07:18

## 2019-09-20 RX ADMIN — FUROSEMIDE 40 MG: 10 INJECTION, SOLUTION INTRAMUSCULAR; INTRAVENOUS at 18:25

## 2019-09-20 RX ADMIN — DEXAMETHASONE SODIUM PHOSPHATE 4 MG: 4 INJECTION, SOLUTION INTRAMUSCULAR; INTRAVENOUS at 06:42

## 2019-09-20 RX ADMIN — CEFAZOLIN 1 G: 1 INJECTION, POWDER, FOR SOLUTION INTRAMUSCULAR; INTRAVENOUS at 16:22

## 2019-09-20 ASSESSMENT — PAIN SCALES - GENERAL
PAINLEVEL_OUTOF10: 7
PAINLEVEL_OUTOF10: 6
PAINLEVEL_OUTOF10: 7
PAINLEVEL_OUTOF10: 8
PAINLEVEL_OUTOF10: 6
PAINLEVEL_OUTOF10: 10
PAINLEVEL_OUTOF10: 0
PAINLEVEL_OUTOF10: 7
PAINLEVEL_OUTOF10: 7
PAINLEVEL_OUTOF10: 0

## 2019-09-20 ASSESSMENT — ENCOUNTER SYMPTOMS
COUGH: 1
SHORTNESS OF BREATH: 1

## 2019-09-20 ASSESSMENT — PAIN DESCRIPTION - PAIN TYPE: TYPE: SURGICAL PAIN;ACUTE PAIN

## 2019-09-20 ASSESSMENT — PAIN DESCRIPTION - FREQUENCY: FREQUENCY: CONTINUOUS

## 2019-09-20 ASSESSMENT — PAIN DESCRIPTION - ONSET: ONSET: ON-GOING

## 2019-09-20 ASSESSMENT — PAIN DESCRIPTION - ORIENTATION
ORIENTATION: ANTERIOR;MID
ORIENTATION: MID
ORIENTATION: MID

## 2019-09-20 ASSESSMENT — PAIN DESCRIPTION - PROGRESSION: CLINICAL_PROGRESSION: NOT CHANGED

## 2019-09-20 ASSESSMENT — PAIN - FUNCTIONAL ASSESSMENT: PAIN_FUNCTIONAL_ASSESSMENT: PREVENTS OR INTERFERES SOME ACTIVE ACTIVITIES AND ADLS

## 2019-09-20 ASSESSMENT — PAIN DESCRIPTION - LOCATION
LOCATION: CHEST
LOCATION: INCISION;CHEST
LOCATION: CHEST

## 2019-09-20 ASSESSMENT — PAIN DESCRIPTION - DESCRIPTORS: DESCRIPTORS: ACHING;DISCOMFORT

## 2019-09-21 ENCOUNTER — APPOINTMENT (OUTPATIENT)
Dept: GENERAL RADIOLOGY | Age: 79
DRG: 235 | End: 2019-09-21
Payer: MEDICARE

## 2019-09-21 LAB
ANION GAP SERPL CALCULATED.3IONS-SCNC: 11 MMOL/L (ref 3–16)
BUN BLDV-MCNC: 23 MG/DL (ref 7–20)
CALCIUM SERPL-MCNC: 9.6 MG/DL (ref 8.3–10.6)
CHLORIDE BLD-SCNC: 100 MMOL/L (ref 99–110)
CO2: 28 MMOL/L (ref 21–32)
CREAT SERPL-MCNC: 1 MG/DL (ref 0.6–1.2)
GFR AFRICAN AMERICAN: >60
GFR NON-AFRICAN AMERICAN: 54
GLUCOSE BLD-MCNC: 101 MG/DL (ref 70–99)
GLUCOSE BLD-MCNC: 102 MG/DL (ref 70–99)
GLUCOSE BLD-MCNC: 122 MG/DL (ref 70–99)
GLUCOSE BLD-MCNC: 124 MG/DL (ref 70–99)
GLUCOSE BLD-MCNC: 124 MG/DL (ref 70–99)
GLUCOSE BLD-MCNC: 143 MG/DL (ref 70–99)
GLUCOSE BLD-MCNC: 163 MG/DL (ref 70–99)
GLUCOSE BLD-MCNC: 93 MG/DL (ref 70–99)
GLUCOSE BLD-MCNC: 94 MG/DL (ref 70–99)
GLUCOSE BLD-MCNC: 97 MG/DL (ref 70–99)
HCT VFR BLD CALC: 32.4 % (ref 36–48)
HEMOGLOBIN: 11.5 G/DL (ref 12–16)
MAGNESIUM: 2.1 MG/DL (ref 1.8–2.4)
MCH RBC QN AUTO: 34.7 PG (ref 26–34)
MCHC RBC AUTO-ENTMCNC: 35.6 G/DL (ref 31–36)
MCV RBC AUTO: 97.5 FL (ref 80–100)
PDW BLD-RTO: 14.1 % (ref 12.4–15.4)
PERFORMED ON: ABNORMAL
PERFORMED ON: NORMAL
PLATELET # BLD: 165 K/UL (ref 135–450)
PMV BLD AUTO: 9.5 FL (ref 5–10.5)
POTASSIUM SERPL-SCNC: 3.7 MMOL/L (ref 3.5–5.1)
RBC # BLD: 3.32 M/UL (ref 4–5.2)
SODIUM BLD-SCNC: 139 MMOL/L (ref 136–145)
WBC # BLD: 15.4 K/UL (ref 4–11)

## 2019-09-21 PROCEDURE — 2140000000 HC CCU INTERMEDIATE R&B

## 2019-09-21 PROCEDURE — 97110 THERAPEUTIC EXERCISES: CPT

## 2019-09-21 PROCEDURE — 94660 CPAP INITIATION&MGMT: CPT

## 2019-09-21 PROCEDURE — 71045 X-RAY EXAM CHEST 1 VIEW: CPT

## 2019-09-21 PROCEDURE — 6370000000 HC RX 637 (ALT 250 FOR IP): Performed by: NURSE PRACTITIONER

## 2019-09-21 PROCEDURE — 2580000003 HC RX 258: Performed by: THORACIC SURGERY (CARDIOTHORACIC VASCULAR SURGERY)

## 2019-09-21 PROCEDURE — 99233 SBSQ HOSP IP/OBS HIGH 50: CPT | Performed by: INTERNAL MEDICINE

## 2019-09-21 PROCEDURE — 2500000003 HC RX 250 WO HCPCS: Performed by: THORACIC SURGERY (CARDIOTHORACIC VASCULAR SURGERY)

## 2019-09-21 PROCEDURE — 94640 AIRWAY INHALATION TREATMENT: CPT

## 2019-09-21 PROCEDURE — 2700000000 HC OXYGEN THERAPY PER DAY

## 2019-09-21 PROCEDURE — 97116 GAIT TRAINING THERAPY: CPT

## 2019-09-21 PROCEDURE — 80048 BASIC METABOLIC PNL TOTAL CA: CPT

## 2019-09-21 PROCEDURE — 6360000002 HC RX W HCPCS: Performed by: THORACIC SURGERY (CARDIOTHORACIC VASCULAR SURGERY)

## 2019-09-21 PROCEDURE — 97530 THERAPEUTIC ACTIVITIES: CPT

## 2019-09-21 PROCEDURE — 99024 POSTOP FOLLOW-UP VISIT: CPT | Performed by: THORACIC SURGERY (CARDIOTHORACIC VASCULAR SURGERY)

## 2019-09-21 PROCEDURE — 94761 N-INVAS EAR/PLS OXIMETRY MLT: CPT

## 2019-09-21 PROCEDURE — 85027 COMPLETE CBC AUTOMATED: CPT

## 2019-09-21 PROCEDURE — 6370000000 HC RX 637 (ALT 250 FOR IP): Performed by: THORACIC SURGERY (CARDIOTHORACIC VASCULAR SURGERY)

## 2019-09-21 PROCEDURE — 94669 MECHANICAL CHEST WALL OSCILL: CPT

## 2019-09-21 PROCEDURE — 83735 ASSAY OF MAGNESIUM: CPT

## 2019-09-21 RX ORDER — LOSARTAN POTASSIUM 25 MG/1
12.5 TABLET ORAL DAILY
Status: DISCONTINUED | OUTPATIENT
Start: 2019-09-21 | End: 2019-09-23

## 2019-09-21 RX ORDER — FUROSEMIDE 10 MG/ML
20 INJECTION INTRAMUSCULAR; INTRAVENOUS 4 TIMES DAILY
Status: DISCONTINUED | OUTPATIENT
Start: 2019-09-21 | End: 2019-09-23

## 2019-09-21 RX ADMIN — MAGNESIUM GLUCONATE 500 MG ORAL TABLET 400 MG: 500 TABLET ORAL at 21:34

## 2019-09-21 RX ADMIN — IPRATROPIUM BROMIDE AND ALBUTEROL SULFATE 1 AMPULE: .5; 3 SOLUTION RESPIRATORY (INHALATION) at 17:00

## 2019-09-21 RX ADMIN — ATORVASTATIN CALCIUM 20 MG: 10 TABLET, FILM COATED ORAL at 21:34

## 2019-09-21 RX ADMIN — DOCUSATE SODIUM 100 MG: 100 CAPSULE ORAL at 09:01

## 2019-09-21 RX ADMIN — CLOPIDOGREL BISULFATE 75 MG: 75 TABLET ORAL at 09:02

## 2019-09-21 RX ADMIN — INSULIN GLARGINE 12 UNITS: 100 INJECTION, SOLUTION SUBCUTANEOUS at 00:13

## 2019-09-21 RX ADMIN — METOPROLOL TARTRATE 25 MG: 25 TABLET ORAL at 21:34

## 2019-09-21 RX ADMIN — FUROSEMIDE 20 MG: 10 INJECTION, SOLUTION INTRAMUSCULAR; INTRAVENOUS at 12:34

## 2019-09-21 RX ADMIN — SODIUM CHLORIDE 5.15 UNITS/HR: 9 INJECTION, SOLUTION INTRAVENOUS at 00:12

## 2019-09-21 RX ADMIN — MUPIROCIN: 20 OINTMENT TOPICAL at 12:40

## 2019-09-21 RX ADMIN — Medication 15 ML: at 21:35

## 2019-09-21 RX ADMIN — MUPIROCIN: 20 OINTMENT TOPICAL at 21:36

## 2019-09-21 RX ADMIN — Medication 10 ML: at 12:39

## 2019-09-21 RX ADMIN — CEFAZOLIN 1 G: 1 INJECTION, POWDER, FOR SOLUTION INTRAMUSCULAR; INTRAVENOUS at 00:14

## 2019-09-21 RX ADMIN — INSULIN LISPRO 1 UNITS: 100 INJECTION, SOLUTION INTRAVENOUS; SUBCUTANEOUS at 21:43

## 2019-09-21 RX ADMIN — FAMOTIDINE 20 MG: 20 TABLET ORAL at 09:02

## 2019-09-21 RX ADMIN — METOPROLOL TARTRATE 25 MG: 25 TABLET ORAL at 09:12

## 2019-09-21 RX ADMIN — CETIRIZINE HYDROCHLORIDE 5 MG: 10 TABLET, FILM COATED ORAL at 09:02

## 2019-09-21 RX ADMIN — MAGNESIUM GLUCONATE 500 MG ORAL TABLET 400 MG: 500 TABLET ORAL at 09:02

## 2019-09-21 RX ADMIN — DEXAMETHASONE SODIUM PHOSPHATE 4 MG: 4 INJECTION, SOLUTION INTRAMUSCULAR; INTRAVENOUS at 00:14

## 2019-09-21 RX ADMIN — OXYCODONE HYDROCHLORIDE 10 MG: 5 TABLET ORAL at 15:58

## 2019-09-21 RX ADMIN — MORPHINE SULFATE 2 MG: 2 INJECTION, SOLUTION INTRAMUSCULAR; INTRAVENOUS at 17:19

## 2019-09-21 RX ADMIN — POTASSIUM CHLORIDE 10 MEQ: 10 TABLET, EXTENDED RELEASE ORAL at 17:25

## 2019-09-21 RX ADMIN — Medication 10 ML: at 21:35

## 2019-09-21 RX ADMIN — POTASSIUM CHLORIDE 20 MEQ: 29.8 INJECTION, SOLUTION INTRAVENOUS at 06:07

## 2019-09-21 RX ADMIN — OXYCODONE HYDROCHLORIDE 5 MG: 5 TABLET ORAL at 01:50

## 2019-09-21 RX ADMIN — PREDNISONE 10 MG: 10 TABLET ORAL at 09:01

## 2019-09-21 RX ADMIN — POTASSIUM CHLORIDE 10 MEQ: 10 TABLET, EXTENDED RELEASE ORAL at 09:01

## 2019-09-21 RX ADMIN — ACETAMINOPHEN 1000 MG: 500 TABLET ORAL at 06:01

## 2019-09-21 RX ADMIN — MORPHINE SULFATE 2 MG: 2 INJECTION, SOLUTION INTRAMUSCULAR; INTRAVENOUS at 06:01

## 2019-09-21 RX ADMIN — IPRATROPIUM BROMIDE AND ALBUTEROL SULFATE 1 AMPULE: .5; 3 SOLUTION RESPIRATORY (INHALATION) at 12:27

## 2019-09-21 RX ADMIN — BUDESONIDE 500 MCG: 0.5 SUSPENSION RESPIRATORY (INHALATION) at 21:08

## 2019-09-21 RX ADMIN — OXYCODONE HYDROCHLORIDE 5 MG: 5 TABLET ORAL at 09:13

## 2019-09-21 RX ADMIN — Medication 15 ML: at 12:40

## 2019-09-21 RX ADMIN — BUDESONIDE 500 MCG: 0.5 SUSPENSION RESPIRATORY (INHALATION) at 09:27

## 2019-09-21 RX ADMIN — IPRATROPIUM BROMIDE AND ALBUTEROL SULFATE 1 AMPULE: .5; 3 SOLUTION RESPIRATORY (INHALATION) at 21:08

## 2019-09-21 RX ADMIN — MORPHINE SULFATE 2 MG: 2 INJECTION, SOLUTION INTRAMUSCULAR; INTRAVENOUS at 00:13

## 2019-09-21 RX ADMIN — POTASSIUM CHLORIDE 10 MEQ: 10 TABLET, EXTENDED RELEASE ORAL at 13:45

## 2019-09-21 RX ADMIN — POTASSIUM CHLORIDE 20 MEQ: 29.8 INJECTION, SOLUTION INTRAVENOUS at 04:57

## 2019-09-21 RX ADMIN — DEXAMETHASONE SODIUM PHOSPHATE 4 MG: 4 INJECTION, SOLUTION INTRAMUSCULAR; INTRAVENOUS at 06:01

## 2019-09-21 RX ADMIN — FUROSEMIDE 20 MG: 10 INJECTION, SOLUTION INTRAMUSCULAR; INTRAVENOUS at 21:34

## 2019-09-21 RX ADMIN — DEXTROSE MONOHYDRATE 2 MG/HR: 50 INJECTION, SOLUTION INTRAVENOUS at 07:39

## 2019-09-21 RX ADMIN — DOCUSATE SODIUM 100 MG: 100 CAPSULE ORAL at 21:34

## 2019-09-21 RX ADMIN — FUROSEMIDE 20 MG: 10 INJECTION, SOLUTION INTRAMUSCULAR; INTRAVENOUS at 17:21

## 2019-09-21 RX ADMIN — LOSARTAN POTASSIUM 12.5 MG: 25 TABLET, FILM COATED ORAL at 09:02

## 2019-09-21 RX ADMIN — IPRATROPIUM BROMIDE AND ALBUTEROL SULFATE 1 AMPULE: .5; 3 SOLUTION RESPIRATORY (INHALATION) at 09:27

## 2019-09-21 RX ADMIN — ASPIRIN 81 MG: 81 TABLET, COATED ORAL at 09:01

## 2019-09-21 ASSESSMENT — PAIN SCALES - GENERAL
PAINLEVEL_OUTOF10: 0
PAINLEVEL_OUTOF10: 6
PAINLEVEL_OUTOF10: 5
PAINLEVEL_OUTOF10: 9
PAINLEVEL_OUTOF10: 6
PAINLEVEL_OUTOF10: 9

## 2019-09-21 ASSESSMENT — PAIN - FUNCTIONAL ASSESSMENT: PAIN_FUNCTIONAL_ASSESSMENT: PREVENTS OR INTERFERES SOME ACTIVE ACTIVITIES AND ADLS

## 2019-09-21 ASSESSMENT — PAIN DESCRIPTION - PROGRESSION: CLINICAL_PROGRESSION: NOT CHANGED

## 2019-09-21 ASSESSMENT — PAIN DESCRIPTION - LOCATION
LOCATION: CHEST

## 2019-09-21 ASSESSMENT — PAIN DESCRIPTION - PAIN TYPE
TYPE: SURGICAL PAIN

## 2019-09-21 ASSESSMENT — PAIN DESCRIPTION - ONSET: ONSET: ON-GOING

## 2019-09-21 ASSESSMENT — PAIN DESCRIPTION - ORIENTATION: ORIENTATION: LEFT;OUTER

## 2019-09-21 ASSESSMENT — PAIN DESCRIPTION - FREQUENCY: FREQUENCY: CONTINUOUS

## 2019-09-22 ENCOUNTER — APPOINTMENT (OUTPATIENT)
Dept: GENERAL RADIOLOGY | Age: 79
DRG: 235 | End: 2019-09-22
Payer: MEDICARE

## 2019-09-22 LAB
ANION GAP SERPL CALCULATED.3IONS-SCNC: 9 MMOL/L (ref 3–16)
BLOOD BANK DISPENSE STATUS: NORMAL
BLOOD BANK DISPENSE STATUS: NORMAL
BLOOD BANK PRODUCT CODE: NORMAL
BLOOD BANK PRODUCT CODE: NORMAL
BPU ID: NORMAL
BPU ID: NORMAL
BUN BLDV-MCNC: 35 MG/DL (ref 7–20)
CALCIUM SERPL-MCNC: 9.7 MG/DL (ref 8.3–10.6)
CHLORIDE BLD-SCNC: 100 MMOL/L (ref 99–110)
CO2: 33 MMOL/L (ref 21–32)
CREAT SERPL-MCNC: 1 MG/DL (ref 0.6–1.2)
DESCRIPTION BLOOD BANK: NORMAL
DESCRIPTION BLOOD BANK: NORMAL
GFR AFRICAN AMERICAN: >60
GFR NON-AFRICAN AMERICAN: 54
GLUCOSE BLD-MCNC: 102 MG/DL (ref 70–99)
GLUCOSE BLD-MCNC: 108 MG/DL (ref 70–99)
GLUCOSE BLD-MCNC: 137 MG/DL (ref 70–99)
GLUCOSE BLD-MCNC: 83 MG/DL (ref 70–99)
HCT VFR BLD CALC: 33.7 % (ref 36–48)
HEMOGLOBIN: 11.5 G/DL (ref 12–16)
MAGNESIUM: 2 MG/DL (ref 1.8–2.4)
MCH RBC QN AUTO: 31.8 PG (ref 26–34)
MCHC RBC AUTO-ENTMCNC: 34.1 G/DL (ref 31–36)
MCV RBC AUTO: 93.1 FL (ref 80–100)
PDW BLD-RTO: 14.4 % (ref 12.4–15.4)
PERFORMED ON: ABNORMAL
PERFORMED ON: ABNORMAL
PERFORMED ON: NORMAL
PLATELET # BLD: 176 K/UL (ref 135–450)
PMV BLD AUTO: 9.1 FL (ref 5–10.5)
POTASSIUM SERPL-SCNC: 4.7 MMOL/L (ref 3.5–5.1)
RBC # BLD: 3.62 M/UL (ref 4–5.2)
SODIUM BLD-SCNC: 142 MMOL/L (ref 136–145)
WBC # BLD: 18.4 K/UL (ref 4–11)

## 2019-09-22 PROCEDURE — 6360000002 HC RX W HCPCS

## 2019-09-22 PROCEDURE — 94640 AIRWAY INHALATION TREATMENT: CPT

## 2019-09-22 PROCEDURE — 94761 N-INVAS EAR/PLS OXIMETRY MLT: CPT

## 2019-09-22 PROCEDURE — 6360000002 HC RX W HCPCS: Performed by: THORACIC SURGERY (CARDIOTHORACIC VASCULAR SURGERY)

## 2019-09-22 PROCEDURE — 2580000003 HC RX 258: Performed by: THORACIC SURGERY (CARDIOTHORACIC VASCULAR SURGERY)

## 2019-09-22 PROCEDURE — 87205 SMEAR GRAM STAIN: CPT

## 2019-09-22 PROCEDURE — 2140000000 HC CCU INTERMEDIATE R&B

## 2019-09-22 PROCEDURE — 99232 SBSQ HOSP IP/OBS MODERATE 35: CPT | Performed by: INTERNAL MEDICINE

## 2019-09-22 PROCEDURE — 2700000000 HC OXYGEN THERAPY PER DAY

## 2019-09-22 PROCEDURE — 87186 SC STD MICRODIL/AGAR DIL: CPT

## 2019-09-22 PROCEDURE — 80048 BASIC METABOLIC PNL TOTAL CA: CPT

## 2019-09-22 PROCEDURE — 6370000000 HC RX 637 (ALT 250 FOR IP): Performed by: NURSE PRACTITIONER

## 2019-09-22 PROCEDURE — 6370000000 HC RX 637 (ALT 250 FOR IP): Performed by: THORACIC SURGERY (CARDIOTHORACIC VASCULAR SURGERY)

## 2019-09-22 PROCEDURE — 87070 CULTURE OTHR SPECIMN AEROBIC: CPT

## 2019-09-22 PROCEDURE — 31645 BRNCHSC W/THER ASPIR 1ST: CPT | Performed by: INTERNAL MEDICINE

## 2019-09-22 PROCEDURE — 6360000002 HC RX W HCPCS: Performed by: INTERNAL MEDICINE

## 2019-09-22 PROCEDURE — 94660 CPAP INITIATION&MGMT: CPT

## 2019-09-22 PROCEDURE — 85027 COMPLETE CBC AUTOMATED: CPT

## 2019-09-22 PROCEDURE — 87077 CULTURE AEROBIC IDENTIFY: CPT

## 2019-09-22 PROCEDURE — 0BC18ZZ EXTIRPATION OF MATTER FROM TRACHEA, VIA NATURAL OR ARTIFICIAL OPENING ENDOSCOPIC: ICD-10-PCS | Performed by: INTERNAL MEDICINE

## 2019-09-22 PROCEDURE — 83735 ASSAY OF MAGNESIUM: CPT

## 2019-09-22 PROCEDURE — 71045 X-RAY EXAM CHEST 1 VIEW: CPT

## 2019-09-22 PROCEDURE — 94669 MECHANICAL CHEST WALL OSCILL: CPT

## 2019-09-22 PROCEDURE — 99024 POSTOP FOLLOW-UP VISIT: CPT | Performed by: THORACIC SURGERY (CARDIOTHORACIC VASCULAR SURGERY)

## 2019-09-22 RX ORDER — ACETYLCYSTEINE 200 MG/ML
600 SOLUTION ORAL; RESPIRATORY (INHALATION) 2 TIMES DAILY
Status: DISCONTINUED | OUTPATIENT
Start: 2019-09-22 | End: 2019-09-24 | Stop reason: HOSPADM

## 2019-09-22 RX ORDER — MIDAZOLAM HYDROCHLORIDE 5 MG/ML
INJECTION INTRAMUSCULAR; INTRAVENOUS
Status: COMPLETED
Start: 2019-09-22 | End: 2019-09-22

## 2019-09-22 RX ORDER — MIDAZOLAM HYDROCHLORIDE 1 MG/ML
2 INJECTION INTRAMUSCULAR; INTRAVENOUS ONCE
Status: DISCONTINUED | OUTPATIENT
Start: 2019-09-22 | End: 2019-09-24 | Stop reason: HOSPADM

## 2019-09-22 RX ORDER — FENTANYL CITRATE 50 UG/ML
25 INJECTION, SOLUTION INTRAMUSCULAR; INTRAVENOUS ONCE
Status: COMPLETED | OUTPATIENT
Start: 2019-09-22 | End: 2019-09-22

## 2019-09-22 RX ORDER — FENTANYL CITRATE 50 UG/ML
INJECTION, SOLUTION INTRAMUSCULAR; INTRAVENOUS
Status: DISPENSED
Start: 2019-09-22 | End: 2019-09-22

## 2019-09-22 RX ADMIN — IPRATROPIUM BROMIDE AND ALBUTEROL SULFATE 1 AMPULE: .5; 3 SOLUTION RESPIRATORY (INHALATION) at 20:50

## 2019-09-22 RX ADMIN — ACETYLCYSTEINE 600 MG: 200 SOLUTION ORAL; RESPIRATORY (INHALATION) at 09:23

## 2019-09-22 RX ADMIN — METOPROLOL TARTRATE 25 MG: 25 TABLET ORAL at 08:49

## 2019-09-22 RX ADMIN — OXYCODONE HYDROCHLORIDE 5 MG: 5 TABLET ORAL at 07:29

## 2019-09-22 RX ADMIN — MUPIROCIN: 20 OINTMENT TOPICAL at 10:15

## 2019-09-22 RX ADMIN — METOPROLOL TARTRATE 25 MG: 25 TABLET ORAL at 21:08

## 2019-09-22 RX ADMIN — DOCUSATE SODIUM 100 MG: 100 CAPSULE ORAL at 16:27

## 2019-09-22 RX ADMIN — MIDAZOLAM HYDROCHLORIDE 2 MG: 5 INJECTION, SOLUTION INTRAMUSCULAR; INTRAVENOUS at 11:50

## 2019-09-22 RX ADMIN — OXYCODONE HYDROCHLORIDE 5 MG: 5 TABLET ORAL at 03:16

## 2019-09-22 RX ADMIN — ASPIRIN 81 MG: 81 TABLET, COATED ORAL at 08:48

## 2019-09-22 RX ADMIN — BUDESONIDE 500 MCG: 0.5 SUSPENSION RESPIRATORY (INHALATION) at 20:50

## 2019-09-22 RX ADMIN — IPRATROPIUM BROMIDE AND ALBUTEROL SULFATE 1 AMPULE: .5; 3 SOLUTION RESPIRATORY (INHALATION) at 11:34

## 2019-09-22 RX ADMIN — Medication 10 ML: at 21:08

## 2019-09-22 RX ADMIN — ATORVASTATIN CALCIUM 20 MG: 10 TABLET, FILM COATED ORAL at 21:08

## 2019-09-22 RX ADMIN — FUROSEMIDE 20 MG: 10 INJECTION, SOLUTION INTRAMUSCULAR; INTRAVENOUS at 12:00

## 2019-09-22 RX ADMIN — Medication 15 ML: at 21:08

## 2019-09-22 RX ADMIN — MAGNESIUM GLUCONATE 500 MG ORAL TABLET 400 MG: 500 TABLET ORAL at 21:08

## 2019-09-22 RX ADMIN — FAMOTIDINE 20 MG: 20 TABLET ORAL at 08:49

## 2019-09-22 RX ADMIN — FENTANYL CITRATE 25 MCG: 50 INJECTION, SOLUTION INTRAMUSCULAR; INTRAVENOUS at 11:59

## 2019-09-22 RX ADMIN — OXYCODONE HYDROCHLORIDE 5 MG: 5 TABLET ORAL at 19:55

## 2019-09-22 RX ADMIN — LOSARTAN POTASSIUM 12.5 MG: 25 TABLET, FILM COATED ORAL at 08:48

## 2019-09-22 RX ADMIN — CLOPIDOGREL BISULFATE 75 MG: 75 TABLET ORAL at 08:48

## 2019-09-22 RX ADMIN — IPRATROPIUM BROMIDE AND ALBUTEROL SULFATE 1 AMPULE: .5; 3 SOLUTION RESPIRATORY (INHALATION) at 09:23

## 2019-09-22 RX ADMIN — Medication 10 ML: at 10:15

## 2019-09-22 RX ADMIN — MORPHINE SULFATE 2 MG: 2 INJECTION, SOLUTION INTRAMUSCULAR; INTRAVENOUS at 16:13

## 2019-09-22 RX ADMIN — POTASSIUM CHLORIDE 10 MEQ: 10 TABLET, EXTENDED RELEASE ORAL at 16:31

## 2019-09-22 RX ADMIN — BUDESONIDE 500 MCG: 0.5 SUSPENSION RESPIRATORY (INHALATION) at 09:22

## 2019-09-22 RX ADMIN — FUROSEMIDE 20 MG: 10 INJECTION, SOLUTION INTRAMUSCULAR; INTRAVENOUS at 16:40

## 2019-09-22 RX ADMIN — PREDNISONE 10 MG: 10 TABLET ORAL at 08:48

## 2019-09-22 RX ADMIN — DOCUSATE SODIUM 100 MG: 100 CAPSULE ORAL at 21:08

## 2019-09-22 RX ADMIN — IPRATROPIUM BROMIDE AND ALBUTEROL SULFATE 1 AMPULE: .5; 3 SOLUTION RESPIRATORY (INHALATION) at 16:05

## 2019-09-22 RX ADMIN — ACETYLCYSTEINE 600 MG: 200 SOLUTION ORAL; RESPIRATORY (INHALATION) at 20:51

## 2019-09-22 RX ADMIN — FUROSEMIDE 20 MG: 10 INJECTION, SOLUTION INTRAMUSCULAR; INTRAVENOUS at 21:09

## 2019-09-22 RX ADMIN — MUPIROCIN: 20 OINTMENT TOPICAL at 21:08

## 2019-09-22 ASSESSMENT — PAIN DESCRIPTION - FREQUENCY: FREQUENCY: CONTINUOUS

## 2019-09-22 ASSESSMENT — PAIN DESCRIPTION - ONSET: ONSET: ON-GOING

## 2019-09-22 ASSESSMENT — PAIN SCALES - GENERAL
PAINLEVEL_OUTOF10: 6
PAINLEVEL_OUTOF10: 10

## 2019-09-22 ASSESSMENT — PAIN DESCRIPTION - LOCATION: LOCATION: CHEST

## 2019-09-22 ASSESSMENT — PAIN DESCRIPTION - DESCRIPTORS: DESCRIPTORS: ACHING;SHARP

## 2019-09-23 LAB
ANION GAP SERPL CALCULATED.3IONS-SCNC: 9 MMOL/L (ref 3–16)
BUN BLDV-MCNC: 39 MG/DL (ref 7–20)
CALCIUM SERPL-MCNC: 10 MG/DL (ref 8.3–10.6)
CHLORIDE BLD-SCNC: 95 MMOL/L (ref 99–110)
CO2: 36 MMOL/L (ref 21–32)
CREAT SERPL-MCNC: 1 MG/DL (ref 0.6–1.2)
GFR AFRICAN AMERICAN: >60
GFR NON-AFRICAN AMERICAN: 54
GLUCOSE BLD-MCNC: 107 MG/DL (ref 70–99)
GLUCOSE BLD-MCNC: 116 MG/DL (ref 70–99)
HCT VFR BLD CALC: 37 % (ref 36–48)
HEMOGLOBIN: 12.6 G/DL (ref 12–16)
MAGNESIUM: 2.2 MG/DL (ref 1.8–2.4)
MCH RBC QN AUTO: 31.9 PG (ref 26–34)
MCHC RBC AUTO-ENTMCNC: 34 G/DL (ref 31–36)
MCV RBC AUTO: 94 FL (ref 80–100)
PDW BLD-RTO: 14.6 % (ref 12.4–15.4)
PERFORMED ON: ABNORMAL
PLATELET # BLD: 176 K/UL (ref 135–450)
PMV BLD AUTO: 9.4 FL (ref 5–10.5)
POTASSIUM SERPL-SCNC: 4.4 MMOL/L (ref 3.5–5.1)
RBC # BLD: 3.94 M/UL (ref 4–5.2)
SODIUM BLD-SCNC: 140 MMOL/L (ref 136–145)
WBC # BLD: 13.2 K/UL (ref 4–11)

## 2019-09-23 PROCEDURE — 99232 SBSQ HOSP IP/OBS MODERATE 35: CPT | Performed by: NURSE PRACTITIONER

## 2019-09-23 PROCEDURE — 94640 AIRWAY INHALATION TREATMENT: CPT

## 2019-09-23 PROCEDURE — 6370000000 HC RX 637 (ALT 250 FOR IP): Performed by: THORACIC SURGERY (CARDIOTHORACIC VASCULAR SURGERY)

## 2019-09-23 PROCEDURE — 94761 N-INVAS EAR/PLS OXIMETRY MLT: CPT

## 2019-09-23 PROCEDURE — 80048 BASIC METABOLIC PNL TOTAL CA: CPT

## 2019-09-23 PROCEDURE — 6360000002 HC RX W HCPCS: Performed by: THORACIC SURGERY (CARDIOTHORACIC VASCULAR SURGERY)

## 2019-09-23 PROCEDURE — 2580000003 HC RX 258: Performed by: THORACIC SURGERY (CARDIOTHORACIC VASCULAR SURGERY)

## 2019-09-23 PROCEDURE — 2700000000 HC OXYGEN THERAPY PER DAY

## 2019-09-23 PROCEDURE — 97116 GAIT TRAINING THERAPY: CPT

## 2019-09-23 PROCEDURE — 97530 THERAPEUTIC ACTIVITIES: CPT

## 2019-09-23 PROCEDURE — 99233 SBSQ HOSP IP/OBS HIGH 50: CPT | Performed by: INTERNAL MEDICINE

## 2019-09-23 PROCEDURE — 99024 POSTOP FOLLOW-UP VISIT: CPT | Performed by: THORACIC SURGERY (CARDIOTHORACIC VASCULAR SURGERY)

## 2019-09-23 PROCEDURE — 2140000000 HC CCU INTERMEDIATE R&B

## 2019-09-23 PROCEDURE — 94660 CPAP INITIATION&MGMT: CPT

## 2019-09-23 PROCEDURE — 85027 COMPLETE CBC AUTOMATED: CPT

## 2019-09-23 PROCEDURE — 6360000002 HC RX W HCPCS: Performed by: NURSE PRACTITIONER

## 2019-09-23 PROCEDURE — 6370000000 HC RX 637 (ALT 250 FOR IP): Performed by: NURSE PRACTITIONER

## 2019-09-23 PROCEDURE — 83735 ASSAY OF MAGNESIUM: CPT

## 2019-09-23 PROCEDURE — 97110 THERAPEUTIC EXERCISES: CPT

## 2019-09-23 PROCEDURE — 94669 MECHANICAL CHEST WALL OSCILL: CPT

## 2019-09-23 PROCEDURE — 2580000003 HC RX 258: Performed by: NURSE PRACTITIONER

## 2019-09-23 RX ORDER — LOSARTAN POTASSIUM 25 MG/1
25 TABLET ORAL DAILY
Status: DISCONTINUED | OUTPATIENT
Start: 2019-09-23 | End: 2019-09-24 | Stop reason: HOSPADM

## 2019-09-23 RX ORDER — FUROSEMIDE 10 MG/ML
20 INJECTION INTRAMUSCULAR; INTRAVENOUS 2 TIMES DAILY
Status: DISCONTINUED | OUTPATIENT
Start: 2019-09-23 | End: 2019-09-24

## 2019-09-23 RX ORDER — POLYETHYLENE GLYCOL 3350 17 G/17G
17 POWDER, FOR SOLUTION ORAL DAILY
Status: DISCONTINUED | OUTPATIENT
Start: 2019-09-23 | End: 2019-09-24 | Stop reason: HOSPADM

## 2019-09-23 RX ADMIN — CETIRIZINE HYDROCHLORIDE 5 MG: 10 TABLET, FILM COATED ORAL at 09:17

## 2019-09-23 RX ADMIN — IPRATROPIUM BROMIDE AND ALBUTEROL SULFATE 1 AMPULE: .5; 3 SOLUTION RESPIRATORY (INHALATION) at 21:31

## 2019-09-23 RX ADMIN — OXYCODONE HYDROCHLORIDE 10 MG: 5 TABLET ORAL at 04:29

## 2019-09-23 RX ADMIN — BUDESONIDE 500 MCG: 0.5 SUSPENSION RESPIRATORY (INHALATION) at 09:18

## 2019-09-23 RX ADMIN — POTASSIUM CHLORIDE 10 MEQ: 10 TABLET, EXTENDED RELEASE ORAL at 09:17

## 2019-09-23 RX ADMIN — OXYCODONE HYDROCHLORIDE 5 MG: 5 TABLET ORAL at 16:14

## 2019-09-23 RX ADMIN — Medication 10 ML: at 21:14

## 2019-09-23 RX ADMIN — ONDANSETRON 4 MG: 2 INJECTION INTRAMUSCULAR; INTRAVENOUS at 13:17

## 2019-09-23 RX ADMIN — MAGNESIUM GLUCONATE 500 MG ORAL TABLET 400 MG: 500 TABLET ORAL at 21:09

## 2019-09-23 RX ADMIN — METOPROLOL TARTRATE 25 MG: 25 TABLET ORAL at 21:09

## 2019-09-23 RX ADMIN — MAGNESIUM GLUCONATE 500 MG ORAL TABLET 400 MG: 500 TABLET ORAL at 09:16

## 2019-09-23 RX ADMIN — PREDNISONE 10 MG: 10 TABLET ORAL at 09:16

## 2019-09-23 RX ADMIN — POTASSIUM BICARBONATE 10 MEQ: 782 TABLET, EFFERVESCENT ORAL at 21:08

## 2019-09-23 RX ADMIN — BUDESONIDE 500 MCG: 0.5 SUSPENSION RESPIRATORY (INHALATION) at 21:31

## 2019-09-23 RX ADMIN — LOSARTAN POTASSIUM 25 MG: 25 TABLET, FILM COATED ORAL at 09:18

## 2019-09-23 RX ADMIN — IPRATROPIUM BROMIDE AND ALBUTEROL SULFATE 1 AMPULE: .5; 3 SOLUTION RESPIRATORY (INHALATION) at 15:22

## 2019-09-23 RX ADMIN — DOCUSATE SODIUM 100 MG: 100 CAPSULE ORAL at 09:13

## 2019-09-23 RX ADMIN — ONDANSETRON 4 MG: 2 INJECTION INTRAMUSCULAR; INTRAVENOUS at 21:23

## 2019-09-23 RX ADMIN — CLOPIDOGREL BISULFATE 75 MG: 75 TABLET ORAL at 09:17

## 2019-09-23 RX ADMIN — POLYETHYLENE GLYCOL 3350 17 G: 17 POWDER, FOR SOLUTION ORAL at 10:48

## 2019-09-23 RX ADMIN — OXYCODONE HYDROCHLORIDE 10 MG: 5 TABLET ORAL at 00:35

## 2019-09-23 RX ADMIN — FUROSEMIDE 20 MG: 10 INJECTION, SOLUTION INTRAMUSCULAR; INTRAVENOUS at 18:17

## 2019-09-23 RX ADMIN — MUPIROCIN: 20 OINTMENT TOPICAL at 11:02

## 2019-09-23 RX ADMIN — Medication 15 ML: at 09:24

## 2019-09-23 RX ADMIN — OXYCODONE HYDROCHLORIDE 10 MG: 5 TABLET ORAL at 09:39

## 2019-09-23 RX ADMIN — ACETYLCYSTEINE 600 MG: 200 SOLUTION ORAL; RESPIRATORY (INHALATION) at 09:18

## 2019-09-23 RX ADMIN — FUROSEMIDE 20 MG: 10 INJECTION, SOLUTION INTRAMUSCULAR; INTRAVENOUS at 09:24

## 2019-09-23 RX ADMIN — POTASSIUM BICARBONATE 10 MEQ: 782 TABLET, EFFERVESCENT ORAL at 13:27

## 2019-09-23 RX ADMIN — CEFTRIAXONE SODIUM 1 G: 1 INJECTION, POWDER, FOR SOLUTION INTRAMUSCULAR; INTRAVENOUS at 10:58

## 2019-09-23 RX ADMIN — METOPROLOL TARTRATE 25 MG: 25 TABLET ORAL at 09:18

## 2019-09-23 RX ADMIN — DOCUSATE SODIUM 100 MG: 100 CAPSULE ORAL at 21:09

## 2019-09-23 RX ADMIN — Medication 10 ML: at 09:22

## 2019-09-23 RX ADMIN — ATORVASTATIN CALCIUM 20 MG: 10 TABLET, FILM COATED ORAL at 21:09

## 2019-09-23 RX ADMIN — ASPIRIN 81 MG: 81 TABLET, COATED ORAL at 09:12

## 2019-09-23 RX ADMIN — IPRATROPIUM BROMIDE AND ALBUTEROL SULFATE 1 AMPULE: .5; 3 SOLUTION RESPIRATORY (INHALATION) at 09:18

## 2019-09-23 RX ADMIN — OXYCODONE HYDROCHLORIDE 5 MG: 5 TABLET ORAL at 21:23

## 2019-09-23 RX ADMIN — ACETYLCYSTEINE 600 MG: 200 SOLUTION ORAL; RESPIRATORY (INHALATION) at 21:31

## 2019-09-23 RX ADMIN — FAMOTIDINE 20 MG: 20 TABLET ORAL at 09:22

## 2019-09-23 RX ADMIN — Medication 15 ML: at 21:15

## 2019-09-23 ASSESSMENT — PAIN SCALES - GENERAL
PAINLEVEL_OUTOF10: 0
PAINLEVEL_OUTOF10: 4
PAINLEVEL_OUTOF10: 5
PAINLEVEL_OUTOF10: 0
PAINLEVEL_OUTOF10: 9
PAINLEVEL_OUTOF10: 0
PAINLEVEL_OUTOF10: 3
PAINLEVEL_OUTOF10: 4
PAINLEVEL_OUTOF10: 4

## 2019-09-23 ASSESSMENT — PAIN DESCRIPTION - LOCATION
LOCATION: SHOULDER
LOCATION: CHEST

## 2019-09-23 ASSESSMENT — PAIN SCALES - WONG BAKER: WONGBAKER_NUMERICALRESPONSE: 4

## 2019-09-23 ASSESSMENT — ENCOUNTER SYMPTOMS
COUGH: 1
SHORTNESS OF BREATH: 1

## 2019-09-23 ASSESSMENT — PAIN DESCRIPTION - PAIN TYPE: TYPE: SURGICAL PAIN

## 2019-09-23 ASSESSMENT — PAIN DESCRIPTION - ORIENTATION
ORIENTATION: RIGHT
ORIENTATION: OUTER;LEFT

## 2019-09-24 ENCOUNTER — HOSPITAL ENCOUNTER (INPATIENT)
Age: 79
LOS: 10 days | Discharge: HOME HEALTH | DRG: 949 | End: 2019-10-04
Attending: PHYSICAL MEDICINE & REHABILITATION | Admitting: PHYSICAL MEDICINE & REHABILITATION
Payer: MEDICARE

## 2019-09-24 VITALS
OXYGEN SATURATION: 100 % | TEMPERATURE: 97.9 F | HEIGHT: 64 IN | SYSTOLIC BLOOD PRESSURE: 132 MMHG | WEIGHT: 108.8 LBS | DIASTOLIC BLOOD PRESSURE: 73 MMHG | HEART RATE: 75 BPM | RESPIRATION RATE: 18 BRPM | BODY MASS INDEX: 18.57 KG/M2

## 2019-09-24 DIAGNOSIS — Z95.1 STATUS POST AORTO-CORONARY ARTERY BYPASS GRAFT: ICD-10-CM

## 2019-09-24 PROBLEM — I25.10 CAD IN NATIVE ARTERY: Status: ACTIVE | Noted: 2019-09-24

## 2019-09-24 LAB
ANION GAP SERPL CALCULATED.3IONS-SCNC: 9 MMOL/L (ref 3–16)
BUN BLDV-MCNC: 49 MG/DL (ref 7–20)
CALCIUM SERPL-MCNC: 10.2 MG/DL (ref 8.3–10.6)
CHLORIDE BLD-SCNC: 90 MMOL/L (ref 99–110)
CO2: 38 MMOL/L (ref 21–32)
CREAT SERPL-MCNC: 1.3 MG/DL (ref 0.6–1.2)
GFR AFRICAN AMERICAN: 48
GFR NON-AFRICAN AMERICAN: 40
GLUCOSE BLD-MCNC: 97 MG/DL (ref 70–99)
HCT VFR BLD CALC: 38.5 % (ref 36–48)
HEMOGLOBIN: 12.7 G/DL (ref 12–16)
MAGNESIUM: 2.4 MG/DL (ref 1.8–2.4)
MCH RBC QN AUTO: 30.9 PG (ref 26–34)
MCHC RBC AUTO-ENTMCNC: 32.9 G/DL (ref 31–36)
MCV RBC AUTO: 94.1 FL (ref 80–100)
PDW BLD-RTO: 14.4 % (ref 12.4–15.4)
PLATELET # BLD: 167 K/UL (ref 135–450)
PMV BLD AUTO: 9.2 FL (ref 5–10.5)
POTASSIUM SERPL-SCNC: 4.8 MMOL/L (ref 3.5–5.1)
RBC # BLD: 4.09 M/UL (ref 4–5.2)
SODIUM BLD-SCNC: 137 MMOL/L (ref 136–145)
WBC # BLD: 12 K/UL (ref 4–11)

## 2019-09-24 PROCEDURE — 6360000002 HC RX W HCPCS: Performed by: THORACIC SURGERY (CARDIOTHORACIC VASCULAR SURGERY)

## 2019-09-24 PROCEDURE — 97110 THERAPEUTIC EXERCISES: CPT

## 2019-09-24 PROCEDURE — 99024 POSTOP FOLLOW-UP VISIT: CPT | Performed by: THORACIC SURGERY (CARDIOTHORACIC VASCULAR SURGERY)

## 2019-09-24 PROCEDURE — 85027 COMPLETE CBC AUTOMATED: CPT

## 2019-09-24 PROCEDURE — 6370000000 HC RX 637 (ALT 250 FOR IP): Performed by: THORACIC SURGERY (CARDIOTHORACIC VASCULAR SURGERY)

## 2019-09-24 PROCEDURE — 2580000003 HC RX 258: Performed by: NURSE PRACTITIONER

## 2019-09-24 PROCEDURE — 97535 SELF CARE MNGMENT TRAINING: CPT

## 2019-09-24 PROCEDURE — 6370000000 HC RX 637 (ALT 250 FOR IP): Performed by: NURSE PRACTITIONER

## 2019-09-24 PROCEDURE — 6370000000 HC RX 637 (ALT 250 FOR IP): Performed by: PHYSICAL MEDICINE & REHABILITATION

## 2019-09-24 PROCEDURE — 36592 COLLECT BLOOD FROM PICC: CPT

## 2019-09-24 PROCEDURE — 83735 ASSAY OF MAGNESIUM: CPT

## 2019-09-24 PROCEDURE — 94640 AIRWAY INHALATION TREATMENT: CPT

## 2019-09-24 PROCEDURE — 80048 BASIC METABOLIC PNL TOTAL CA: CPT

## 2019-09-24 PROCEDURE — 99232 SBSQ HOSP IP/OBS MODERATE 35: CPT | Performed by: INTERNAL MEDICINE

## 2019-09-24 PROCEDURE — 1280000000 HC REHAB R&B

## 2019-09-24 PROCEDURE — 6360000002 HC RX W HCPCS: Performed by: NURSE PRACTITIONER

## 2019-09-24 PROCEDURE — 94660 CPAP INITIATION&MGMT: CPT

## 2019-09-24 RX ORDER — ALBUMIN, HUMAN INJ 5% 5 %
25 SOLUTION INTRAVENOUS PRN
Status: DISCONTINUED | OUTPATIENT
Start: 2019-09-24 | End: 2019-09-25

## 2019-09-24 RX ORDER — IPRATROPIUM BROMIDE AND ALBUTEROL SULFATE 2.5; .5 MG/3ML; MG/3ML
1 SOLUTION RESPIRATORY (INHALATION)
Status: CANCELLED | OUTPATIENT
Start: 2019-09-24

## 2019-09-24 RX ORDER — CHLORHEXIDINE GLUCONATE 0.12 MG/ML
15 RINSE ORAL 2 TIMES DAILY
Status: CANCELLED | OUTPATIENT
Start: 2019-09-24

## 2019-09-24 RX ORDER — ACETYLCYSTEINE 200 MG/ML
600 SOLUTION ORAL; RESPIRATORY (INHALATION) 2 TIMES DAILY
Status: CANCELLED | OUTPATIENT
Start: 2019-09-24

## 2019-09-24 RX ORDER — HYDRALAZINE HYDROCHLORIDE 50 MG/1
50 TABLET, FILM COATED ORAL EVERY 6 HOURS PRN
Status: CANCELLED | OUTPATIENT
Start: 2019-09-24

## 2019-09-24 RX ORDER — OXYCODONE HYDROCHLORIDE 5 MG/1
5 TABLET ORAL EVERY 4 HOURS PRN
Qty: 28 TABLET | Refills: 0 | Status: ON HOLD | OUTPATIENT
Start: 2019-09-24 | End: 2019-10-03

## 2019-09-24 RX ORDER — FAMOTIDINE 20 MG/1
20 TABLET, FILM COATED ORAL DAILY
Status: CANCELLED | OUTPATIENT
Start: 2019-09-25

## 2019-09-24 RX ORDER — CHLORHEXIDINE GLUCONATE 0.12 MG/ML
15 RINSE ORAL 2 TIMES DAILY
Status: DISCONTINUED | OUTPATIENT
Start: 2019-09-24 | End: 2019-09-25

## 2019-09-24 RX ORDER — DOCUSATE SODIUM 100 MG/1
100 CAPSULE, LIQUID FILLED ORAL 2 TIMES DAILY
Status: CANCELLED | OUTPATIENT
Start: 2019-09-24

## 2019-09-24 RX ORDER — FUROSEMIDE 10 MG/ML
20 INJECTION INTRAMUSCULAR; INTRAVENOUS DAILY
Status: DISCONTINUED | OUTPATIENT
Start: 2019-09-25 | End: 2019-09-24 | Stop reason: HOSPADM

## 2019-09-24 RX ORDER — ACETAMINOPHEN 325 MG/1
650 TABLET ORAL EVERY 4 HOURS PRN
Status: CANCELLED | OUTPATIENT
Start: 2019-09-24

## 2019-09-24 RX ORDER — ATORVASTATIN CALCIUM 10 MG/1
20 TABLET, FILM COATED ORAL NIGHTLY
Status: DISCONTINUED | OUTPATIENT
Start: 2019-09-24 | End: 2019-10-04 | Stop reason: HOSPADM

## 2019-09-24 RX ORDER — ATORVASTATIN CALCIUM 10 MG/1
20 TABLET, FILM COATED ORAL NIGHTLY
Status: CANCELLED | OUTPATIENT
Start: 2019-09-24

## 2019-09-24 RX ORDER — ALBUMIN, HUMAN INJ 5% 5 %
25 SOLUTION INTRAVENOUS PRN
Status: CANCELLED | OUTPATIENT
Start: 2019-09-24

## 2019-09-24 RX ORDER — IPRATROPIUM BROMIDE AND ALBUTEROL SULFATE 2.5; .5 MG/3ML; MG/3ML
3 SOLUTION RESPIRATORY (INHALATION) EVERY 6 HOURS
Qty: 360 ML | Refills: 0 | Status: ON HOLD | OUTPATIENT
Start: 2019-09-24 | End: 2022-03-30

## 2019-09-24 RX ORDER — POLYETHYLENE GLYCOL 3350 17 G/17G
17 POWDER, FOR SOLUTION ORAL DAILY
Status: CANCELLED | OUTPATIENT
Start: 2019-09-25

## 2019-09-24 RX ORDER — DEXTROSE MONOHYDRATE 50 MG/ML
100 INJECTION, SOLUTION INTRAVENOUS PRN
Status: DISCONTINUED | OUTPATIENT
Start: 2019-09-24 | End: 2019-10-04 | Stop reason: HOSPADM

## 2019-09-24 RX ORDER — TRAZODONE HYDROCHLORIDE 50 MG/1
50 TABLET ORAL NIGHTLY PRN
Status: DISCONTINUED | OUTPATIENT
Start: 2019-09-24 | End: 2019-10-04 | Stop reason: HOSPADM

## 2019-09-24 RX ORDER — MAGNESIUM SULFATE IN WATER 40 MG/ML
2 INJECTION, SOLUTION INTRAVENOUS PRN
Status: CANCELLED | OUTPATIENT
Start: 2019-09-24

## 2019-09-24 RX ORDER — ONDANSETRON 2 MG/ML
4 INJECTION INTRAMUSCULAR; INTRAVENOUS EVERY 8 HOURS PRN
Status: CANCELLED | OUTPATIENT
Start: 2019-09-24

## 2019-09-24 RX ORDER — ACETAMINOPHEN 325 MG/1
650 TABLET ORAL EVERY 4 HOURS PRN
Status: DISCONTINUED | OUTPATIENT
Start: 2019-09-24 | End: 2019-10-04 | Stop reason: HOSPADM

## 2019-09-24 RX ORDER — SODIUM CHLORIDE 0.9 % (FLUSH) 0.9 %
10 SYRINGE (ML) INJECTION PRN
Status: CANCELLED | OUTPATIENT
Start: 2019-09-24

## 2019-09-24 RX ORDER — FAMOTIDINE 20 MG/1
20 TABLET, FILM COATED ORAL DAILY
Qty: 60 TABLET | Refills: 3 | Status: SHIPPED | OUTPATIENT
Start: 2019-09-25 | End: 2021-06-01

## 2019-09-24 RX ORDER — CETIRIZINE HYDROCHLORIDE 5 MG/1
5 TABLET ORAL DAILY
Qty: 30 TABLET | Refills: 0 | Status: SHIPPED | OUTPATIENT
Start: 2019-09-25 | End: 2019-10-25

## 2019-09-24 RX ORDER — NICOTINE POLACRILEX 4 MG
15 LOZENGE BUCCAL PRN
Status: CANCELLED | OUTPATIENT
Start: 2019-09-24

## 2019-09-24 RX ORDER — CLOPIDOGREL BISULFATE 75 MG/1
75 TABLET ORAL DAILY
Status: CANCELLED | OUTPATIENT
Start: 2019-09-25

## 2019-09-24 RX ORDER — CETIRIZINE HYDROCHLORIDE 5 MG/1
5 TABLET ORAL DAILY
Status: DISCONTINUED | OUTPATIENT
Start: 2019-09-25 | End: 2019-09-25

## 2019-09-24 RX ORDER — OXYCODONE HYDROCHLORIDE 5 MG/1
10 TABLET ORAL EVERY 4 HOURS PRN
Status: CANCELLED | OUTPATIENT
Start: 2019-09-24

## 2019-09-24 RX ORDER — PREDNISONE 10 MG/1
10 TABLET ORAL DAILY
Qty: 10 TABLET | Refills: 0 | Status: ON HOLD | OUTPATIENT
Start: 2019-09-25 | End: 2019-10-03 | Stop reason: HOSPADM

## 2019-09-24 RX ORDER — SODIUM CHLORIDE 0.9 % (FLUSH) 0.9 %
10 SYRINGE (ML) INJECTION PRN
Status: DISCONTINUED | OUTPATIENT
Start: 2019-09-24 | End: 2019-10-04 | Stop reason: HOSPADM

## 2019-09-24 RX ORDER — MAGNESIUM SULFATE IN WATER 40 MG/ML
2 INJECTION, SOLUTION INTRAVENOUS PRN
Status: DISCONTINUED | OUTPATIENT
Start: 2019-09-24 | End: 2019-09-25 | Stop reason: DRUGHIGH

## 2019-09-24 RX ORDER — SODIUM CHLORIDE 0.9 % (FLUSH) 0.9 %
10 SYRINGE (ML) INJECTION EVERY 12 HOURS SCHEDULED
Status: CANCELLED | OUTPATIENT
Start: 2019-09-24

## 2019-09-24 RX ORDER — FUROSEMIDE 10 MG/ML
20 INJECTION INTRAMUSCULAR; INTRAVENOUS DAILY
Status: DISCONTINUED | OUTPATIENT
Start: 2019-09-25 | End: 2019-09-25

## 2019-09-24 RX ORDER — ACETAMINOPHEN 325 MG/1
650 TABLET ORAL EVERY 4 HOURS PRN
Status: DISCONTINUED | OUTPATIENT
Start: 2019-09-24 | End: 2019-09-24 | Stop reason: SDUPTHER

## 2019-09-24 RX ORDER — BUDESONIDE 0.5 MG/2ML
0.5 INHALANT ORAL 2 TIMES DAILY
Status: DISCONTINUED | OUTPATIENT
Start: 2019-09-24 | End: 2019-10-04 | Stop reason: HOSPADM

## 2019-09-24 RX ORDER — DEXTROSE MONOHYDRATE 25 G/50ML
12.5 INJECTION, SOLUTION INTRAVENOUS PRN
Status: CANCELLED | OUTPATIENT
Start: 2019-09-24

## 2019-09-24 RX ORDER — OXYCODONE HYDROCHLORIDE 5 MG/1
5 TABLET ORAL EVERY 4 HOURS PRN
Status: CANCELLED | OUTPATIENT
Start: 2019-09-24

## 2019-09-24 RX ORDER — DEXTROSE MONOHYDRATE 50 MG/ML
100 INJECTION, SOLUTION INTRAVENOUS PRN
Status: CANCELLED | OUTPATIENT
Start: 2019-09-24

## 2019-09-24 RX ORDER — NICOTINE POLACRILEX 4 MG
15 LOZENGE BUCCAL PRN
Status: DISCONTINUED | OUTPATIENT
Start: 2019-09-24 | End: 2019-10-04 | Stop reason: HOSPADM

## 2019-09-24 RX ORDER — BISACODYL 10 MG
10 SUPPOSITORY, RECTAL RECTAL ONCE
Status: COMPLETED | OUTPATIENT
Start: 2019-09-24 | End: 2019-09-24

## 2019-09-24 RX ORDER — HYDRALAZINE HYDROCHLORIDE 25 MG/1
50 TABLET, FILM COATED ORAL EVERY 6 HOURS PRN
Status: DISCONTINUED | OUTPATIENT
Start: 2019-09-24 | End: 2019-10-04 | Stop reason: HOSPADM

## 2019-09-24 RX ORDER — ASPIRIN 81 MG/1
81 TABLET ORAL DAILY
Status: DISCONTINUED | OUTPATIENT
Start: 2019-09-25 | End: 2019-10-04 | Stop reason: HOSPADM

## 2019-09-24 RX ORDER — BUDESONIDE 0.5 MG/2ML
0.5 INHALANT ORAL 2 TIMES DAILY
Status: CANCELLED | OUTPATIENT
Start: 2019-09-24

## 2019-09-24 RX ORDER — TRAZODONE HYDROCHLORIDE 50 MG/1
50 TABLET ORAL NIGHTLY PRN
Status: CANCELLED | OUTPATIENT
Start: 2019-09-24

## 2019-09-24 RX ORDER — DEXTROSE MONOHYDRATE 25 G/50ML
12.5 INJECTION, SOLUTION INTRAVENOUS PRN
Status: DISCONTINUED | OUTPATIENT
Start: 2019-09-24 | End: 2019-10-04 | Stop reason: HOSPADM

## 2019-09-24 RX ORDER — OXYCODONE HYDROCHLORIDE 5 MG/1
5 TABLET ORAL EVERY 4 HOURS PRN
Status: DISCONTINUED | OUTPATIENT
Start: 2019-09-24 | End: 2019-10-04 | Stop reason: HOSPADM

## 2019-09-24 RX ORDER — LOSARTAN POTASSIUM 25 MG/1
25 TABLET ORAL DAILY
Status: CANCELLED | OUTPATIENT
Start: 2019-09-25

## 2019-09-24 RX ORDER — OXYCODONE HYDROCHLORIDE 5 MG/1
10 TABLET ORAL EVERY 4 HOURS PRN
Status: DISCONTINUED | OUTPATIENT
Start: 2019-09-24 | End: 2019-10-04 | Stop reason: HOSPADM

## 2019-09-24 RX ORDER — FUROSEMIDE 10 MG/ML
20 INJECTION INTRAMUSCULAR; INTRAVENOUS DAILY
Status: CANCELLED | OUTPATIENT
Start: 2019-09-25

## 2019-09-24 RX ORDER — ATORVASTATIN CALCIUM 20 MG/1
20 TABLET, FILM COATED ORAL NIGHTLY
Qty: 30 TABLET | Refills: 3 | Status: ON HOLD | OUTPATIENT
Start: 2019-09-24 | End: 2022-04-09 | Stop reason: HOSPADM

## 2019-09-24 RX ORDER — LOSARTAN POTASSIUM 25 MG/1
25 TABLET ORAL DAILY
Status: DISCONTINUED | OUTPATIENT
Start: 2019-09-25 | End: 2019-10-04 | Stop reason: HOSPADM

## 2019-09-24 RX ORDER — ASPIRIN 81 MG/1
81 TABLET ORAL DAILY
Status: CANCELLED | OUTPATIENT
Start: 2019-09-25

## 2019-09-24 RX ORDER — SODIUM CHLORIDE 0.9 % (FLUSH) 0.9 %
10 SYRINGE (ML) INJECTION EVERY 12 HOURS SCHEDULED
Status: DISCONTINUED | OUTPATIENT
Start: 2019-09-24 | End: 2019-09-30

## 2019-09-24 RX ORDER — PREDNISONE 10 MG/1
10 TABLET ORAL DAILY
Status: CANCELLED | OUTPATIENT
Start: 2019-09-25

## 2019-09-24 RX ORDER — CETIRIZINE HYDROCHLORIDE 10 MG/1
5 TABLET ORAL DAILY
Status: CANCELLED | OUTPATIENT
Start: 2019-09-25

## 2019-09-24 RX ORDER — BUDESONIDE 0.5 MG/2ML
0.5 INHALANT ORAL 2 TIMES DAILY
Qty: 60 AMPULE | Refills: 3 | Status: ON HOLD | OUTPATIENT
Start: 2019-09-24 | End: 2022-03-30

## 2019-09-24 RX ORDER — DOCUSATE SODIUM 100 MG/1
100 CAPSULE, LIQUID FILLED ORAL 2 TIMES DAILY
Status: DISCONTINUED | OUTPATIENT
Start: 2019-09-24 | End: 2019-10-04 | Stop reason: HOSPADM

## 2019-09-24 RX ORDER — ACETYLCYSTEINE 200 MG/ML
600 SOLUTION ORAL; RESPIRATORY (INHALATION) 2 TIMES DAILY
Status: DISCONTINUED | OUTPATIENT
Start: 2019-09-24 | End: 2019-09-27

## 2019-09-24 RX ORDER — ASPIRIN 81 MG/1
81 TABLET ORAL DAILY
Qty: 30 TABLET | Refills: 3 | Status: ON HOLD | OUTPATIENT
Start: 2019-09-25 | End: 2020-02-25 | Stop reason: HOSPADM

## 2019-09-24 RX ORDER — IPRATROPIUM BROMIDE AND ALBUTEROL SULFATE 2.5; .5 MG/3ML; MG/3ML
1 SOLUTION RESPIRATORY (INHALATION)
Status: DISCONTINUED | OUTPATIENT
Start: 2019-09-25 | End: 2019-09-25

## 2019-09-24 RX ORDER — POLYETHYLENE GLYCOL 3350 17 G/17G
17 POWDER, FOR SOLUTION ORAL DAILY
Status: DISCONTINUED | OUTPATIENT
Start: 2019-09-25 | End: 2019-10-04 | Stop reason: HOSPADM

## 2019-09-24 RX ORDER — ONDANSETRON 2 MG/ML
4 INJECTION INTRAMUSCULAR; INTRAVENOUS EVERY 8 HOURS PRN
Status: DISCONTINUED | OUTPATIENT
Start: 2019-09-24 | End: 2019-10-04 | Stop reason: HOSPADM

## 2019-09-24 RX ORDER — PSEUDOEPHEDRINE HCL 30 MG
100 TABLET ORAL 2 TIMES DAILY
Qty: 30 CAPSULE | Refills: 0 | Status: SHIPPED | OUTPATIENT
Start: 2019-09-24

## 2019-09-24 RX ORDER — ONDANSETRON 4 MG/1
8 TABLET, ORALLY DISINTEGRATING ORAL EVERY 8 HOURS PRN
Status: CANCELLED | OUTPATIENT
Start: 2019-09-24

## 2019-09-24 RX ORDER — ONDANSETRON 4 MG/1
8 TABLET, ORALLY DISINTEGRATING ORAL EVERY 8 HOURS PRN
Status: DISCONTINUED | OUTPATIENT
Start: 2019-09-24 | End: 2019-10-04 | Stop reason: HOSPADM

## 2019-09-24 RX ORDER — PREDNISONE 10 MG/1
10 TABLET ORAL DAILY
Status: DISCONTINUED | OUTPATIENT
Start: 2019-09-25 | End: 2019-09-29

## 2019-09-24 RX ORDER — FAMOTIDINE 20 MG/1
20 TABLET, FILM COATED ORAL DAILY
Status: DISCONTINUED | OUTPATIENT
Start: 2019-09-25 | End: 2019-10-04 | Stop reason: HOSPADM

## 2019-09-24 RX ORDER — CLOPIDOGREL BISULFATE 75 MG/1
75 TABLET ORAL DAILY
Status: DISCONTINUED | OUTPATIENT
Start: 2019-09-25 | End: 2019-10-04 | Stop reason: HOSPADM

## 2019-09-24 RX ADMIN — METOPROLOL TARTRATE 25 MG: 25 TABLET, FILM COATED ORAL at 22:00

## 2019-09-24 RX ADMIN — Medication 330 ML: at 16:45

## 2019-09-24 RX ADMIN — DOCUSATE SODIUM 100 MG: 100 CAPSULE, LIQUID FILLED ORAL at 22:00

## 2019-09-24 RX ADMIN — Medication 15 ML: at 11:06

## 2019-09-24 RX ADMIN — CLOPIDOGREL BISULFATE 75 MG: 75 TABLET ORAL at 11:04

## 2019-09-24 RX ADMIN — PREDNISONE 10 MG: 10 TABLET ORAL at 11:04

## 2019-09-24 RX ADMIN — Medication 10 MG: at 12:58

## 2019-09-24 RX ADMIN — MUPIROCIN: 20 OINTMENT TOPICAL at 11:06

## 2019-09-24 RX ADMIN — POLYETHYLENE GLYCOL 3350 17 G: 17 POWDER, FOR SOLUTION ORAL at 11:03

## 2019-09-24 RX ADMIN — ASPIRIN 81 MG: 81 TABLET, COATED ORAL at 11:03

## 2019-09-24 RX ADMIN — LOSARTAN POTASSIUM 25 MG: 25 TABLET, FILM COATED ORAL at 11:04

## 2019-09-24 RX ADMIN — ATORVASTATIN CALCIUM 20 MG: 10 TABLET, FILM COATED ORAL at 22:00

## 2019-09-24 RX ADMIN — FUROSEMIDE 20 MG: 10 INJECTION, SOLUTION INTRAMUSCULAR; INTRAVENOUS at 11:03

## 2019-09-24 RX ADMIN — OXYCODONE HYDROCHLORIDE 5 MG: 5 TABLET ORAL at 03:42

## 2019-09-24 RX ADMIN — METOPROLOL TARTRATE 25 MG: 25 TABLET ORAL at 11:04

## 2019-09-24 RX ADMIN — ONDANSETRON 4 MG: 2 INJECTION INTRAMUSCULAR; INTRAVENOUS at 12:58

## 2019-09-24 RX ADMIN — OXYCODONE HYDROCHLORIDE 5 MG: 5 TABLET ORAL at 11:28

## 2019-09-24 RX ADMIN — IPRATROPIUM BROMIDE AND ALBUTEROL SULFATE 1 AMPULE: .5; 3 SOLUTION RESPIRATORY (INHALATION) at 21:41

## 2019-09-24 RX ADMIN — CEFTRIAXONE SODIUM 1 G: 1 INJECTION, POWDER, FOR SOLUTION INTRAMUSCULAR; INTRAVENOUS at 11:06

## 2019-09-24 RX ADMIN — DOCUSATE SODIUM 100 MG: 100 CAPSULE ORAL at 11:03

## 2019-09-24 RX ADMIN — FAMOTIDINE 20 MG: 20 TABLET ORAL at 11:03

## 2019-09-24 RX ADMIN — CETIRIZINE HYDROCHLORIDE 5 MG: 10 TABLET, FILM COATED ORAL at 11:04

## 2019-09-24 ASSESSMENT — PAIN DESCRIPTION - PAIN TYPE: TYPE: SURGICAL PAIN

## 2019-09-24 ASSESSMENT — PAIN SCALES - GENERAL
PAINLEVEL_OUTOF10: 6
PAINLEVEL_OUTOF10: 8
PAINLEVEL_OUTOF10: 8
PAINLEVEL_OUTOF10: 2

## 2019-09-24 ASSESSMENT — PAIN DESCRIPTION - LOCATION: LOCATION: STERNUM

## 2019-09-24 ASSESSMENT — PAIN SCALES - WONG BAKER: WONGBAKER_NUMERICALRESPONSE: 4

## 2019-09-25 PROBLEM — E44.0 MODERATE MALNUTRITION (HCC): Status: ACTIVE | Noted: 2019-09-25

## 2019-09-25 PROBLEM — J15.212 PNEUMONIA OF BOTH LOWER LOBES DUE TO METHICILLIN RESISTANT STAPHYLOCOCCUS AUREUS (MRSA) (HCC): Status: ACTIVE | Noted: 2019-09-25

## 2019-09-25 PROBLEM — Z95.1 S/P CABG (CORONARY ARTERY BYPASS GRAFT): Status: ACTIVE | Noted: 2019-09-25

## 2019-09-25 LAB
A/G RATIO: 0.9 (ref 1.1–2.2)
ALBUMIN SERPL-MCNC: 3 G/DL (ref 3.4–5)
ALP BLD-CCNC: 53 U/L (ref 40–129)
ALT SERPL-CCNC: 7 U/L (ref 10–40)
ANION GAP SERPL CALCULATED.3IONS-SCNC: 11 MMOL/L (ref 3–16)
AST SERPL-CCNC: 16 U/L (ref 15–37)
BILIRUB SERPL-MCNC: 0.7 MG/DL (ref 0–1)
BUN BLDV-MCNC: 51 MG/DL (ref 7–20)
CALCIUM SERPL-MCNC: 9.6 MG/DL (ref 8.3–10.6)
CHLORIDE BLD-SCNC: 92 MMOL/L (ref 99–110)
CO2: 34 MMOL/L (ref 21–32)
CREAT SERPL-MCNC: 1.3 MG/DL (ref 0.6–1.2)
CULTURE, RESPIRATORY: ABNORMAL
CULTURE, RESPIRATORY: ABNORMAL
GFR AFRICAN AMERICAN: 48
GFR NON-AFRICAN AMERICAN: 40
GLOBULIN: 3.3 G/DL
GLUCOSE BLD-MCNC: 110 MG/DL (ref 70–99)
GRAM STAIN RESULT: ABNORMAL
HCT VFR BLD CALC: 37.9 % (ref 36–48)
HEMOGLOBIN: 12.4 G/DL (ref 12–16)
MCH RBC QN AUTO: 31.1 PG (ref 26–34)
MCHC RBC AUTO-ENTMCNC: 32.8 G/DL (ref 31–36)
MCV RBC AUTO: 94.9 FL (ref 80–100)
ORGANISM: ABNORMAL
PDW BLD-RTO: 14.5 % (ref 12.4–15.4)
PLATELET # BLD: 130 K/UL (ref 135–450)
PLATELET SLIDE REVIEW: ADEQUATE
PMV BLD AUTO: 9.1 FL (ref 5–10.5)
POTASSIUM REFLEX MAGNESIUM: 5 MMOL/L (ref 3.5–5.1)
RBC # BLD: 3.99 M/UL (ref 4–5.2)
SLIDE REVIEW: ABNORMAL
SODIUM BLD-SCNC: 137 MMOL/L (ref 136–145)
TOTAL PROTEIN: 6.3 G/DL (ref 6.4–8.2)
WBC # BLD: 13.3 K/UL (ref 4–11)

## 2019-09-25 PROCEDURE — 97166 OT EVAL MOD COMPLEX 45 MIN: CPT

## 2019-09-25 PROCEDURE — 6370000000 HC RX 637 (ALT 250 FOR IP): Performed by: INTERNAL MEDICINE

## 2019-09-25 PROCEDURE — 2580000003 HC RX 258: Performed by: PHYSICAL MEDICINE & REHABILITATION

## 2019-09-25 PROCEDURE — 97110 THERAPEUTIC EXERCISES: CPT

## 2019-09-25 PROCEDURE — 99233 SBSQ HOSP IP/OBS HIGH 50: CPT | Performed by: INTERNAL MEDICINE

## 2019-09-25 PROCEDURE — 36415 COLL VENOUS BLD VENIPUNCTURE: CPT

## 2019-09-25 PROCEDURE — 92523 SPEECH SOUND LANG COMPREHEN: CPT

## 2019-09-25 PROCEDURE — 2700000000 HC OXYGEN THERAPY PER DAY

## 2019-09-25 PROCEDURE — 2500000003 HC RX 250 WO HCPCS: Performed by: PHYSICAL MEDICINE & REHABILITATION

## 2019-09-25 PROCEDURE — 6370000000 HC RX 637 (ALT 250 FOR IP): Performed by: PHYSICAL MEDICINE & REHABILITATION

## 2019-09-25 PROCEDURE — 94761 N-INVAS EAR/PLS OXIMETRY MLT: CPT

## 2019-09-25 PROCEDURE — 94669 MECHANICAL CHEST WALL OSCILL: CPT

## 2019-09-25 PROCEDURE — 97535 SELF CARE MNGMENT TRAINING: CPT

## 2019-09-25 PROCEDURE — 97530 THERAPEUTIC ACTIVITIES: CPT

## 2019-09-25 PROCEDURE — 94640 AIRWAY INHALATION TREATMENT: CPT

## 2019-09-25 PROCEDURE — 6360000002 HC RX W HCPCS: Performed by: PHYSICAL MEDICINE & REHABILITATION

## 2019-09-25 PROCEDURE — 97162 PT EVAL MOD COMPLEX 30 MIN: CPT

## 2019-09-25 PROCEDURE — 97116 GAIT TRAINING THERAPY: CPT

## 2019-09-25 PROCEDURE — 85027 COMPLETE CBC AUTOMATED: CPT

## 2019-09-25 PROCEDURE — 1280000000 HC REHAB R&B

## 2019-09-25 PROCEDURE — 80053 COMPREHEN METABOLIC PANEL: CPT

## 2019-09-25 RX ORDER — IPRATROPIUM BROMIDE AND ALBUTEROL SULFATE 2.5; .5 MG/3ML; MG/3ML
1 SOLUTION RESPIRATORY (INHALATION) 2 TIMES DAILY
Status: DISCONTINUED | OUTPATIENT
Start: 2019-09-25 | End: 2019-10-04 | Stop reason: HOSPADM

## 2019-09-25 RX ORDER — GUAIFENESIN 600 MG/1
600 TABLET, EXTENDED RELEASE ORAL 2 TIMES DAILY
Status: DISCONTINUED | OUTPATIENT
Start: 2019-09-25 | End: 2019-10-04 | Stop reason: HOSPADM

## 2019-09-25 RX ORDER — CEFTRIAXONE 1 G/1
1 INJECTION, POWDER, FOR SOLUTION INTRAMUSCULAR; INTRAVENOUS ONCE
Status: DISCONTINUED | OUTPATIENT
Start: 2019-09-25 | End: 2019-09-25 | Stop reason: SDUPTHER

## 2019-09-25 RX ORDER — IPRATROPIUM BROMIDE AND ALBUTEROL SULFATE 2.5; .5 MG/3ML; MG/3ML
1 SOLUTION RESPIRATORY (INHALATION) EVERY 4 HOURS PRN
Status: DISCONTINUED | OUTPATIENT
Start: 2019-09-25 | End: 2019-10-04 | Stop reason: HOSPADM

## 2019-09-25 RX ORDER — LINEZOLID 600 MG/1
600 TABLET, FILM COATED ORAL EVERY 12 HOURS SCHEDULED
Status: DISCONTINUED | OUTPATIENT
Start: 2019-09-25 | End: 2019-10-04 | Stop reason: HOSPADM

## 2019-09-25 RX ORDER — FUROSEMIDE 20 MG/1
20 TABLET ORAL DAILY
Status: DISCONTINUED | OUTPATIENT
Start: 2019-09-26 | End: 2019-09-25

## 2019-09-25 RX ADMIN — CETIRIZINE HYDROCHLORIDE 5 MG: 5 TABLET ORAL at 07:31

## 2019-09-25 RX ADMIN — ACETAMINOPHEN 650 MG: 325 TABLET ORAL at 20:18

## 2019-09-25 RX ADMIN — MAGNESIUM GLUCONATE 500 MG ORAL TABLET 400 MG: 500 TABLET ORAL at 20:26

## 2019-09-25 RX ADMIN — ASPIRIN 81 MG: 81 TABLET ORAL at 07:31

## 2019-09-25 RX ADMIN — IPRATROPIUM BROMIDE AND ALBUTEROL SULFATE 1 AMPULE: .5; 3 SOLUTION RESPIRATORY (INHALATION) at 07:55

## 2019-09-25 RX ADMIN — FAMOTIDINE 20 MG: 20 TABLET ORAL at 07:34

## 2019-09-25 RX ADMIN — BUDESONIDE 500 MCG: 0.5 SUSPENSION RESPIRATORY (INHALATION) at 19:55

## 2019-09-25 RX ADMIN — METOPROLOL TARTRATE 25 MG: 25 TABLET, FILM COATED ORAL at 20:26

## 2019-09-25 RX ADMIN — GUAIFENESIN 600 MG: 600 TABLET, EXTENDED RELEASE ORAL at 20:26

## 2019-09-25 RX ADMIN — LOSARTAN POTASSIUM 25 MG: 25 TABLET, FILM COATED ORAL at 07:32

## 2019-09-25 RX ADMIN — DOCUSATE SODIUM 100 MG: 100 CAPSULE, LIQUID FILLED ORAL at 07:34

## 2019-09-25 RX ADMIN — LINEZOLID 600 MG: 600 TABLET, FILM COATED ORAL at 20:26

## 2019-09-25 RX ADMIN — Medication 10 ML: at 07:40

## 2019-09-25 RX ADMIN — IPRATROPIUM BROMIDE AND ALBUTEROL SULFATE 1 AMPULE: .5; 3 SOLUTION RESPIRATORY (INHALATION) at 23:39

## 2019-09-25 RX ADMIN — ACETYLCYSTEINE 600 MG: 200 SOLUTION ORAL; RESPIRATORY (INHALATION) at 07:55

## 2019-09-25 RX ADMIN — ACETAMINOPHEN 650 MG: 325 TABLET ORAL at 07:32

## 2019-09-25 RX ADMIN — POLYETHYLENE GLYCOL 3350 17 G: 17 POWDER, FOR SOLUTION ORAL at 07:39

## 2019-09-25 RX ADMIN — IPRATROPIUM BROMIDE AND ALBUTEROL SULFATE 1 AMPULE: .5; 3 SOLUTION RESPIRATORY (INHALATION) at 16:05

## 2019-09-25 RX ADMIN — IPRATROPIUM BROMIDE AND ALBUTEROL SULFATE 1 AMPULE: .5; 3 SOLUTION RESPIRATORY (INHALATION) at 19:55

## 2019-09-25 RX ADMIN — ACETAMINOPHEN 650 MG: 325 TABLET ORAL at 03:19

## 2019-09-25 RX ADMIN — CLOPIDOGREL BISULFATE 75 MG: 75 TABLET ORAL at 07:32

## 2019-09-25 RX ADMIN — ACETYLCYSTEINE 600 MG: 200 SOLUTION ORAL; RESPIRATORY (INHALATION) at 19:55

## 2019-09-25 RX ADMIN — LIDOCAINE HYDROCHLORIDE 1000 MG: 10 INJECTION, SOLUTION EPIDURAL; INFILTRATION; INTRACAUDAL; PERINEURAL at 15:15

## 2019-09-25 RX ADMIN — DOCUSATE SODIUM 100 MG: 100 CAPSULE, LIQUID FILLED ORAL at 20:26

## 2019-09-25 RX ADMIN — ONDANSETRON 8 MG: 4 TABLET, ORALLY DISINTEGRATING ORAL at 05:06

## 2019-09-25 RX ADMIN — PREDNISONE 10 MG: 10 TABLET ORAL at 07:31

## 2019-09-25 RX ADMIN — BUDESONIDE 500 MCG: 0.5 SUSPENSION RESPIRATORY (INHALATION) at 07:55

## 2019-09-25 RX ADMIN — ATORVASTATIN CALCIUM 20 MG: 10 TABLET, FILM COATED ORAL at 20:25

## 2019-09-25 RX ADMIN — POTASSIUM BICARBONATE 10 MEQ: 782 TABLET, EFFERVESCENT ORAL at 07:39

## 2019-09-25 RX ADMIN — MAGNESIUM GLUCONATE 500 MG ORAL TABLET 400 MG: 500 TABLET ORAL at 07:30

## 2019-09-25 RX ADMIN — POTASSIUM BICARBONATE 10 MEQ: 782 TABLET, EFFERVESCENT ORAL at 20:25

## 2019-09-25 ASSESSMENT — PAIN DESCRIPTION - ORIENTATION
ORIENTATION: MID
ORIENTATION: MID

## 2019-09-25 ASSESSMENT — PAIN DESCRIPTION - PROGRESSION
CLINICAL_PROGRESSION: NOT CHANGED
CLINICAL_PROGRESSION: NOT CHANGED

## 2019-09-25 ASSESSMENT — PAIN SCALES - GENERAL
PAINLEVEL_OUTOF10: 3
PAINLEVEL_OUTOF10: 0
PAINLEVEL_OUTOF10: 4
PAINLEVEL_OUTOF10: 6
PAINLEVEL_OUTOF10: 5
PAINLEVEL_OUTOF10: 2
PAINLEVEL_OUTOF10: 2

## 2019-09-25 ASSESSMENT — PAIN DESCRIPTION - PAIN TYPE
TYPE: SURGICAL PAIN
TYPE: ACUTE PAIN
TYPE: ACUTE PAIN;SURGICAL PAIN

## 2019-09-25 ASSESSMENT — PAIN DESCRIPTION - ONSET
ONSET: ON-GOING
ONSET: ON-GOING

## 2019-09-25 ASSESSMENT — PAIN DESCRIPTION - LOCATION
LOCATION: INCISION
LOCATION: CHEST
LOCATION: HEAD

## 2019-09-25 ASSESSMENT — PAIN DESCRIPTION - FREQUENCY
FREQUENCY: INTERMITTENT

## 2019-09-25 ASSESSMENT — PAIN DESCRIPTION - DESCRIPTORS
DESCRIPTORS: ACHING;SHARP
DESCRIPTORS: ACHING;SHARP;SHOOTING

## 2019-09-25 ASSESSMENT — PAIN - FUNCTIONAL ASSESSMENT
PAIN_FUNCTIONAL_ASSESSMENT: ACTIVITIES ARE NOT PREVENTED
PAIN_FUNCTIONAL_ASSESSMENT: ACTIVITIES ARE NOT PREVENTED

## 2019-09-26 LAB
ANION GAP SERPL CALCULATED.3IONS-SCNC: 10 MMOL/L (ref 3–16)
BANDED NEUTROPHILS RELATIVE PERCENT: 3 % (ref 0–7)
BASOPHILS ABSOLUTE: 0 K/UL (ref 0–0.2)
BASOPHILS RELATIVE PERCENT: 0 %
BUN BLDV-MCNC: 44 MG/DL (ref 7–20)
CALCIUM SERPL-MCNC: 9.6 MG/DL (ref 8.3–10.6)
CHLORIDE BLD-SCNC: 93 MMOL/L (ref 99–110)
CO2: 35 MMOL/L (ref 21–32)
CREAT SERPL-MCNC: 1.4 MG/DL (ref 0.6–1.2)
EOSINOPHILS ABSOLUTE: 0.6 K/UL (ref 0–0.6)
EOSINOPHILS RELATIVE PERCENT: 5 %
GFR AFRICAN AMERICAN: 44
GFR NON-AFRICAN AMERICAN: 36
GLUCOSE BLD-MCNC: 131 MG/DL (ref 70–99)
GLUCOSE BLD-MCNC: 141 MG/DL (ref 70–99)
GLUCOSE BLD-MCNC: 142 MG/DL (ref 70–99)
GLUCOSE BLD-MCNC: 145 MG/DL (ref 70–99)
HCT VFR BLD CALC: 37.5 % (ref 36–48)
HEMOGLOBIN: 12.5 G/DL (ref 12–16)
LYMPHOCYTES ABSOLUTE: 1.9 K/UL (ref 1–5.1)
LYMPHOCYTES RELATIVE PERCENT: 16 %
MCH RBC QN AUTO: 31.3 PG (ref 26–34)
MCHC RBC AUTO-ENTMCNC: 33.3 G/DL (ref 31–36)
MCV RBC AUTO: 94.2 FL (ref 80–100)
MONOCYTES ABSOLUTE: 0.5 K/UL (ref 0–1.3)
MONOCYTES RELATIVE PERCENT: 4 %
NEUTROPHILS ABSOLUTE: 8.9 K/UL (ref 1.7–7.7)
NEUTROPHILS RELATIVE PERCENT: 72 %
PDW BLD-RTO: 14.3 % (ref 12.4–15.4)
PERFORMED ON: ABNORMAL
PLATELET # BLD: 167 K/UL (ref 135–450)
PLATELET SLIDE REVIEW: ADEQUATE
PMV BLD AUTO: 9.3 FL (ref 5–10.5)
POTASSIUM REFLEX MAGNESIUM: 4.3 MMOL/L (ref 3.5–5.1)
RBC # BLD: 3.98 M/UL (ref 4–5.2)
SLIDE REVIEW: ABNORMAL
SODIUM BLD-SCNC: 138 MMOL/L (ref 136–145)
WBC # BLD: 11.8 K/UL (ref 4–11)

## 2019-09-26 PROCEDURE — 80048 BASIC METABOLIC PNL TOTAL CA: CPT

## 2019-09-26 PROCEDURE — 99232 SBSQ HOSP IP/OBS MODERATE 35: CPT | Performed by: INTERNAL MEDICINE

## 2019-09-26 PROCEDURE — 6370000000 HC RX 637 (ALT 250 FOR IP): Performed by: PHYSICAL MEDICINE & REHABILITATION

## 2019-09-26 PROCEDURE — 97530 THERAPEUTIC ACTIVITIES: CPT

## 2019-09-26 PROCEDURE — 94761 N-INVAS EAR/PLS OXIMETRY MLT: CPT

## 2019-09-26 PROCEDURE — 6370000000 HC RX 637 (ALT 250 FOR IP): Performed by: INTERNAL MEDICINE

## 2019-09-26 PROCEDURE — 85025 COMPLETE CBC W/AUTO DIFF WBC: CPT

## 2019-09-26 PROCEDURE — 97110 THERAPEUTIC EXERCISES: CPT

## 2019-09-26 PROCEDURE — 2700000000 HC OXYGEN THERAPY PER DAY

## 2019-09-26 PROCEDURE — 1280000000 HC REHAB R&B

## 2019-09-26 PROCEDURE — 36415 COLL VENOUS BLD VENIPUNCTURE: CPT

## 2019-09-26 PROCEDURE — 97116 GAIT TRAINING THERAPY: CPT

## 2019-09-26 PROCEDURE — 94669 MECHANICAL CHEST WALL OSCILL: CPT

## 2019-09-26 PROCEDURE — 94640 AIRWAY INHALATION TREATMENT: CPT

## 2019-09-26 PROCEDURE — 97127 HC SP THER IVNTJ W/FOCUS COG FUNCJ: CPT

## 2019-09-26 PROCEDURE — 94660 CPAP INITIATION&MGMT: CPT

## 2019-09-26 PROCEDURE — 6360000002 HC RX W HCPCS: Performed by: PHYSICAL MEDICINE & REHABILITATION

## 2019-09-26 RX ORDER — DIAPER,BRIEF,INFANT-TODD,DISP
EACH MISCELLANEOUS 3 TIMES DAILY PRN
Status: DISCONTINUED | OUTPATIENT
Start: 2019-09-26 | End: 2019-10-04 | Stop reason: HOSPADM

## 2019-09-26 RX ADMIN — LOSARTAN POTASSIUM 25 MG: 25 TABLET, FILM COATED ORAL at 08:05

## 2019-09-26 RX ADMIN — ATORVASTATIN CALCIUM 20 MG: 10 TABLET, FILM COATED ORAL at 20:17

## 2019-09-26 RX ADMIN — METOPROLOL TARTRATE 25 MG: 25 TABLET, FILM COATED ORAL at 08:05

## 2019-09-26 RX ADMIN — PREDNISONE 10 MG: 10 TABLET ORAL at 08:04

## 2019-09-26 RX ADMIN — LINEZOLID 600 MG: 600 TABLET, FILM COATED ORAL at 20:16

## 2019-09-26 RX ADMIN — ACETYLCYSTEINE 600 MG: 200 SOLUTION ORAL; RESPIRATORY (INHALATION) at 19:39

## 2019-09-26 RX ADMIN — GUAIFENESIN 600 MG: 600 TABLET, EXTENDED RELEASE ORAL at 08:05

## 2019-09-26 RX ADMIN — POLYETHYLENE GLYCOL 3350 17 G: 17 POWDER, FOR SOLUTION ORAL at 08:06

## 2019-09-26 RX ADMIN — ASPIRIN 81 MG: 81 TABLET ORAL at 08:05

## 2019-09-26 RX ADMIN — LINEZOLID 600 MG: 600 TABLET, FILM COATED ORAL at 08:05

## 2019-09-26 RX ADMIN — MAGNESIUM GLUCONATE 500 MG ORAL TABLET 400 MG: 500 TABLET ORAL at 08:05

## 2019-09-26 RX ADMIN — FAMOTIDINE 20 MG: 20 TABLET ORAL at 08:05

## 2019-09-26 RX ADMIN — IPRATROPIUM BROMIDE AND ALBUTEROL SULFATE 1 AMPULE: .5; 3 SOLUTION RESPIRATORY (INHALATION) at 07:34

## 2019-09-26 RX ADMIN — METOPROLOL TARTRATE 25 MG: 25 TABLET, FILM COATED ORAL at 20:17

## 2019-09-26 RX ADMIN — CLOPIDOGREL BISULFATE 75 MG: 75 TABLET ORAL at 08:06

## 2019-09-26 RX ADMIN — ACETAMINOPHEN 650 MG: 325 TABLET ORAL at 05:17

## 2019-09-26 RX ADMIN — ACETAMINOPHEN 650 MG: 325 TABLET ORAL at 20:16

## 2019-09-26 RX ADMIN — POTASSIUM BICARBONATE 10 MEQ: 782 TABLET, EFFERVESCENT ORAL at 08:05

## 2019-09-26 RX ADMIN — ACETYLCYSTEINE 600 MG: 200 SOLUTION ORAL; RESPIRATORY (INHALATION) at 07:34

## 2019-09-26 RX ADMIN — MAGNESIUM GLUCONATE 500 MG ORAL TABLET 400 MG: 500 TABLET ORAL at 20:16

## 2019-09-26 RX ADMIN — IPRATROPIUM BROMIDE AND ALBUTEROL SULFATE 1 AMPULE: .5; 3 SOLUTION RESPIRATORY (INHALATION) at 19:39

## 2019-09-26 RX ADMIN — POTASSIUM BICARBONATE 10 MEQ: 782 TABLET, EFFERVESCENT ORAL at 20:18

## 2019-09-26 RX ADMIN — DOCUSATE SODIUM 100 MG: 100 CAPSULE, LIQUID FILLED ORAL at 08:04

## 2019-09-26 RX ADMIN — BUDESONIDE 500 MCG: 0.5 SUSPENSION RESPIRATORY (INHALATION) at 07:35

## 2019-09-26 RX ADMIN — GUAIFENESIN 600 MG: 600 TABLET, EXTENDED RELEASE ORAL at 20:16

## 2019-09-26 RX ADMIN — BUDESONIDE 500 MCG: 0.5 SUSPENSION RESPIRATORY (INHALATION) at 19:39

## 2019-09-26 RX ADMIN — ONDANSETRON 4 MG: 4 TABLET, ORALLY DISINTEGRATING ORAL at 05:44

## 2019-09-26 RX ADMIN — DOCUSATE SODIUM 100 MG: 100 CAPSULE, LIQUID FILLED ORAL at 20:16

## 2019-09-26 ASSESSMENT — PAIN SCALES - GENERAL
PAINLEVEL_OUTOF10: 3
PAINLEVEL_OUTOF10: 2
PAINLEVEL_OUTOF10: 3
PAINLEVEL_OUTOF10: 3

## 2019-09-26 ASSESSMENT — PAIN DESCRIPTION - LOCATION
LOCATION: BUTTOCKS
LOCATION: HEAD
LOCATION: CHEST

## 2019-09-26 ASSESSMENT — PAIN DESCRIPTION - DESCRIPTORS: DESCRIPTORS: HEADACHE

## 2019-09-26 ASSESSMENT — PAIN DESCRIPTION - PAIN TYPE
TYPE: ACUTE PAIN
TYPE: SURGICAL PAIN
TYPE: ACUTE PAIN

## 2019-09-27 PROCEDURE — 94660 CPAP INITIATION&MGMT: CPT

## 2019-09-27 PROCEDURE — 6360000002 HC RX W HCPCS: Performed by: PHYSICAL MEDICINE & REHABILITATION

## 2019-09-27 PROCEDURE — 94761 N-INVAS EAR/PLS OXIMETRY MLT: CPT

## 2019-09-27 PROCEDURE — 97110 THERAPEUTIC EXERCISES: CPT

## 2019-09-27 PROCEDURE — 97530 THERAPEUTIC ACTIVITIES: CPT

## 2019-09-27 PROCEDURE — 6370000000 HC RX 637 (ALT 250 FOR IP): Performed by: INTERNAL MEDICINE

## 2019-09-27 PROCEDURE — 97127 HC SP THER IVNTJ W/FOCUS COG FUNCJ: CPT

## 2019-09-27 PROCEDURE — 2700000000 HC OXYGEN THERAPY PER DAY

## 2019-09-27 PROCEDURE — 99232 SBSQ HOSP IP/OBS MODERATE 35: CPT | Performed by: INTERNAL MEDICINE

## 2019-09-27 PROCEDURE — 94669 MECHANICAL CHEST WALL OSCILL: CPT

## 2019-09-27 PROCEDURE — 6370000000 HC RX 637 (ALT 250 FOR IP): Performed by: PHYSICAL MEDICINE & REHABILITATION

## 2019-09-27 PROCEDURE — 1280000000 HC REHAB R&B

## 2019-09-27 PROCEDURE — 97116 GAIT TRAINING THERAPY: CPT

## 2019-09-27 PROCEDURE — 94640 AIRWAY INHALATION TREATMENT: CPT

## 2019-09-27 RX ORDER — LORAZEPAM 0.5 MG/1
0.5 TABLET ORAL EVERY 4 HOURS PRN
Status: DISCONTINUED | OUTPATIENT
Start: 2019-09-27 | End: 2019-10-04 | Stop reason: HOSPADM

## 2019-09-27 RX ORDER — CITALOPRAM 20 MG/1
10 TABLET ORAL DAILY
Status: DISCONTINUED | OUTPATIENT
Start: 2019-09-27 | End: 2019-09-27

## 2019-09-27 RX ADMIN — ACETAMINOPHEN 650 MG: 325 TABLET ORAL at 08:47

## 2019-09-27 RX ADMIN — IPRATROPIUM BROMIDE AND ALBUTEROL SULFATE 1 AMPULE: .5; 3 SOLUTION RESPIRATORY (INHALATION) at 07:57

## 2019-09-27 RX ADMIN — LINEZOLID 600 MG: 600 TABLET, FILM COATED ORAL at 21:06

## 2019-09-27 RX ADMIN — POLYETHYLENE GLYCOL 3350 17 G: 17 POWDER, FOR SOLUTION ORAL at 08:50

## 2019-09-27 RX ADMIN — ASPIRIN 81 MG: 81 TABLET ORAL at 08:48

## 2019-09-27 RX ADMIN — POTASSIUM BICARBONATE 10 MEQ: 782 TABLET, EFFERVESCENT ORAL at 21:06

## 2019-09-27 RX ADMIN — GUAIFENESIN 600 MG: 600 TABLET, EXTENDED RELEASE ORAL at 08:48

## 2019-09-27 RX ADMIN — BUDESONIDE 500 MCG: 0.5 SUSPENSION RESPIRATORY (INHALATION) at 07:57

## 2019-09-27 RX ADMIN — ATORVASTATIN CALCIUM 20 MG: 10 TABLET, FILM COATED ORAL at 21:06

## 2019-09-27 RX ADMIN — ACETYLCYSTEINE 600 MG: 200 SOLUTION ORAL; RESPIRATORY (INHALATION) at 07:57

## 2019-09-27 RX ADMIN — LORAZEPAM 0.5 MG: 0.5 TABLET ORAL at 10:49

## 2019-09-27 RX ADMIN — METOPROLOL TARTRATE 25 MG: 25 TABLET, FILM COATED ORAL at 08:49

## 2019-09-27 RX ADMIN — LINEZOLID 600 MG: 600 TABLET, FILM COATED ORAL at 08:48

## 2019-09-27 RX ADMIN — PREDNISONE 10 MG: 10 TABLET ORAL at 08:49

## 2019-09-27 RX ADMIN — FAMOTIDINE 20 MG: 20 TABLET ORAL at 08:47

## 2019-09-27 RX ADMIN — CLOPIDOGREL BISULFATE 75 MG: 75 TABLET ORAL at 08:48

## 2019-09-27 RX ADMIN — ACETAMINOPHEN 650 MG: 325 TABLET ORAL at 01:57

## 2019-09-27 RX ADMIN — MAGNESIUM HYDROXIDE 30 ML: 400 SUSPENSION ORAL at 17:59

## 2019-09-27 RX ADMIN — POTASSIUM BICARBONATE 10 MEQ: 782 TABLET, EFFERVESCENT ORAL at 08:49

## 2019-09-27 RX ADMIN — LOSARTAN POTASSIUM 25 MG: 25 TABLET, FILM COATED ORAL at 08:47

## 2019-09-27 RX ADMIN — BUDESONIDE 500 MCG: 0.5 SUSPENSION RESPIRATORY (INHALATION) at 19:02

## 2019-09-27 RX ADMIN — LORAZEPAM 0.5 MG: 0.5 TABLET ORAL at 23:33

## 2019-09-27 RX ADMIN — MAGNESIUM GLUCONATE 500 MG ORAL TABLET 400 MG: 500 TABLET ORAL at 21:06

## 2019-09-27 RX ADMIN — DOCUSATE SODIUM 100 MG: 100 CAPSULE, LIQUID FILLED ORAL at 08:47

## 2019-09-27 RX ADMIN — METOPROLOL TARTRATE 25 MG: 25 TABLET, FILM COATED ORAL at 21:06

## 2019-09-27 RX ADMIN — DOCUSATE SODIUM 100 MG: 100 CAPSULE, LIQUID FILLED ORAL at 21:05

## 2019-09-27 RX ADMIN — IPRATROPIUM BROMIDE AND ALBUTEROL SULFATE 1 AMPULE: .5; 3 SOLUTION RESPIRATORY (INHALATION) at 19:02

## 2019-09-27 RX ADMIN — GUAIFENESIN 600 MG: 600 TABLET, EXTENDED RELEASE ORAL at 21:06

## 2019-09-27 ASSESSMENT — PAIN DESCRIPTION - LOCATION
LOCATION: CHEST

## 2019-09-27 ASSESSMENT — PAIN DESCRIPTION - PAIN TYPE
TYPE: ACUTE PAIN
TYPE: SURGICAL PAIN
TYPE: ACUTE PAIN;SURGICAL PAIN
TYPE: ACUTE PAIN;SURGICAL PAIN

## 2019-09-27 ASSESSMENT — PAIN SCALES - GENERAL
PAINLEVEL_OUTOF10: 2
PAINLEVEL_OUTOF10: 4
PAINLEVEL_OUTOF10: 5
PAINLEVEL_OUTOF10: 3

## 2019-09-27 ASSESSMENT — PAIN DESCRIPTION - PROGRESSION
CLINICAL_PROGRESSION: NOT CHANGED
CLINICAL_PROGRESSION: GRADUALLY WORSENING

## 2019-09-27 ASSESSMENT — PAIN DESCRIPTION - DESCRIPTORS
DESCRIPTORS: ACHING;SHARP
DESCRIPTORS: SHARP

## 2019-09-27 ASSESSMENT — PAIN DESCRIPTION - FREQUENCY
FREQUENCY: INTERMITTENT
FREQUENCY: CONTINUOUS

## 2019-09-27 ASSESSMENT — PAIN DESCRIPTION - ONSET
ONSET: ON-GOING
ONSET: ON-GOING

## 2019-09-27 ASSESSMENT — PAIN DESCRIPTION - ORIENTATION: ORIENTATION: MID

## 2019-09-28 PROCEDURE — 97530 THERAPEUTIC ACTIVITIES: CPT

## 2019-09-28 PROCEDURE — 6370000000 HC RX 637 (ALT 250 FOR IP): Performed by: INTERNAL MEDICINE

## 2019-09-28 PROCEDURE — 6370000000 HC RX 637 (ALT 250 FOR IP): Performed by: PHYSICAL MEDICINE & REHABILITATION

## 2019-09-28 PROCEDURE — 1280000000 HC REHAB R&B

## 2019-09-28 PROCEDURE — 97535 SELF CARE MNGMENT TRAINING: CPT

## 2019-09-28 PROCEDURE — 2700000000 HC OXYGEN THERAPY PER DAY

## 2019-09-28 PROCEDURE — 6360000002 HC RX W HCPCS: Performed by: PHYSICAL MEDICINE & REHABILITATION

## 2019-09-28 PROCEDURE — 94660 CPAP INITIATION&MGMT: CPT

## 2019-09-28 PROCEDURE — 94761 N-INVAS EAR/PLS OXIMETRY MLT: CPT

## 2019-09-28 PROCEDURE — 94640 AIRWAY INHALATION TREATMENT: CPT

## 2019-09-28 PROCEDURE — 97110 THERAPEUTIC EXERCISES: CPT

## 2019-09-28 PROCEDURE — 97127 HC SP THER IVNTJ W/FOCUS COG FUNCJ: CPT

## 2019-09-28 PROCEDURE — 99232 SBSQ HOSP IP/OBS MODERATE 35: CPT | Performed by: INTERNAL MEDICINE

## 2019-09-28 RX ORDER — BISACODYL 10 MG
10 SUPPOSITORY, RECTAL RECTAL DAILY PRN
Status: DISCONTINUED | OUTPATIENT
Start: 2019-09-28 | End: 2019-10-04 | Stop reason: HOSPADM

## 2019-09-28 RX ORDER — SODIUM PHOSPHATE,MONO-DIBASIC 19G-7G/118
1 ENEMA (ML) RECTAL ONCE
Status: COMPLETED | OUTPATIENT
Start: 2019-09-28 | End: 2019-09-28

## 2019-09-28 RX ORDER — POTASSIUM CHLORIDE 750 MG/1
10 TABLET, EXTENDED RELEASE ORAL 2 TIMES DAILY
Status: DISCONTINUED | OUTPATIENT
Start: 2019-09-28 | End: 2019-09-30

## 2019-09-28 RX ADMIN — METOPROLOL TARTRATE 25 MG: 25 TABLET, FILM COATED ORAL at 08:50

## 2019-09-28 RX ADMIN — POLYETHYLENE GLYCOL 3350 17 G: 17 POWDER, FOR SOLUTION ORAL at 08:51

## 2019-09-28 RX ADMIN — LORAZEPAM 0.5 MG: 0.5 TABLET ORAL at 06:29

## 2019-09-28 RX ADMIN — CLOPIDOGREL BISULFATE 75 MG: 75 TABLET ORAL at 08:50

## 2019-09-28 RX ADMIN — POTASSIUM CHLORIDE 10 MEQ: 10 TABLET, EXTENDED RELEASE ORAL at 08:50

## 2019-09-28 RX ADMIN — MAGNESIUM GLUCONATE 500 MG ORAL TABLET 400 MG: 500 TABLET ORAL at 08:50

## 2019-09-28 RX ADMIN — ASPIRIN 81 MG: 81 TABLET ORAL at 08:51

## 2019-09-28 RX ADMIN — LINEZOLID 600 MG: 600 TABLET, FILM COATED ORAL at 21:13

## 2019-09-28 RX ADMIN — IPRATROPIUM BROMIDE AND ALBUTEROL SULFATE 1 AMPULE: .5; 3 SOLUTION RESPIRATORY (INHALATION) at 20:59

## 2019-09-28 RX ADMIN — GUAIFENESIN 600 MG: 600 TABLET, EXTENDED RELEASE ORAL at 08:51

## 2019-09-28 RX ADMIN — LORAZEPAM 0.5 MG: 0.5 TABLET ORAL at 21:13

## 2019-09-28 RX ADMIN — METOPROLOL TARTRATE 25 MG: 25 TABLET, FILM COATED ORAL at 21:12

## 2019-09-28 RX ADMIN — PREDNISONE 10 MG: 10 TABLET ORAL at 08:50

## 2019-09-28 RX ADMIN — DOCUSATE SODIUM 100 MG: 100 CAPSULE, LIQUID FILLED ORAL at 08:50

## 2019-09-28 RX ADMIN — MAGNESIUM GLUCONATE 500 MG ORAL TABLET 400 MG: 500 TABLET ORAL at 21:13

## 2019-09-28 RX ADMIN — ATORVASTATIN CALCIUM 20 MG: 10 TABLET, FILM COATED ORAL at 21:13

## 2019-09-28 RX ADMIN — BUDESONIDE 500 MCG: 0.5 SUSPENSION RESPIRATORY (INHALATION) at 20:59

## 2019-09-28 RX ADMIN — LINEZOLID 600 MG: 600 TABLET, FILM COATED ORAL at 08:51

## 2019-09-28 RX ADMIN — SODIUM PHOSPHATE 1 ENEMA: 7; 19 ENEMA RECTAL at 14:48

## 2019-09-28 RX ADMIN — Medication 10 MG: at 12:57

## 2019-09-28 RX ADMIN — LOSARTAN POTASSIUM 25 MG: 25 TABLET, FILM COATED ORAL at 08:50

## 2019-09-28 RX ADMIN — FAMOTIDINE 20 MG: 20 TABLET ORAL at 08:50

## 2019-09-28 RX ADMIN — GUAIFENESIN 600 MG: 600 TABLET, EXTENDED RELEASE ORAL at 21:12

## 2019-09-28 ASSESSMENT — PAIN SCALES - GENERAL
PAINLEVEL_OUTOF10: 0
PAINLEVEL_OUTOF10: 5
PAINLEVEL_OUTOF10: 0

## 2019-09-28 ASSESSMENT — PAIN DESCRIPTION - LOCATION: LOCATION: RECTUM

## 2019-09-28 ASSESSMENT — PAIN DESCRIPTION - PAIN TYPE: TYPE: ACUTE PAIN

## 2019-09-29 PROCEDURE — 94669 MECHANICAL CHEST WALL OSCILL: CPT

## 2019-09-29 PROCEDURE — 1280000000 HC REHAB R&B

## 2019-09-29 PROCEDURE — 6370000000 HC RX 637 (ALT 250 FOR IP): Performed by: PHYSICAL MEDICINE & REHABILITATION

## 2019-09-29 PROCEDURE — 94761 N-INVAS EAR/PLS OXIMETRY MLT: CPT

## 2019-09-29 PROCEDURE — 94660 CPAP INITIATION&MGMT: CPT

## 2019-09-29 PROCEDURE — 99232 SBSQ HOSP IP/OBS MODERATE 35: CPT | Performed by: INTERNAL MEDICINE

## 2019-09-29 PROCEDURE — 6370000000 HC RX 637 (ALT 250 FOR IP): Performed by: INTERNAL MEDICINE

## 2019-09-29 PROCEDURE — 94640 AIRWAY INHALATION TREATMENT: CPT

## 2019-09-29 PROCEDURE — 2700000000 HC OXYGEN THERAPY PER DAY

## 2019-09-29 PROCEDURE — 6360000002 HC RX W HCPCS: Performed by: PHYSICAL MEDICINE & REHABILITATION

## 2019-09-29 RX ADMIN — POTASSIUM CHLORIDE 10 MEQ: 10 TABLET, EXTENDED RELEASE ORAL at 09:11

## 2019-09-29 RX ADMIN — ASPIRIN 81 MG: 81 TABLET ORAL at 09:11

## 2019-09-29 RX ADMIN — IPRATROPIUM BROMIDE AND ALBUTEROL SULFATE 1 AMPULE: .5; 3 SOLUTION RESPIRATORY (INHALATION) at 19:08

## 2019-09-29 RX ADMIN — IPRATROPIUM BROMIDE AND ALBUTEROL SULFATE 1 AMPULE: .5; 3 SOLUTION RESPIRATORY (INHALATION) at 08:40

## 2019-09-29 RX ADMIN — BUDESONIDE 500 MCG: 0.5 SUSPENSION RESPIRATORY (INHALATION) at 08:40

## 2019-09-29 RX ADMIN — MAGNESIUM GLUCONATE 500 MG ORAL TABLET 400 MG: 500 TABLET ORAL at 23:06

## 2019-09-29 RX ADMIN — BUDESONIDE 500 MCG: 0.5 SUSPENSION RESPIRATORY (INHALATION) at 19:08

## 2019-09-29 RX ADMIN — CLOPIDOGREL BISULFATE 75 MG: 75 TABLET ORAL at 09:11

## 2019-09-29 RX ADMIN — MAGNESIUM GLUCONATE 500 MG ORAL TABLET 400 MG: 500 TABLET ORAL at 09:11

## 2019-09-29 RX ADMIN — LINEZOLID 600 MG: 600 TABLET, FILM COATED ORAL at 09:11

## 2019-09-29 RX ADMIN — LORAZEPAM 0.5 MG: 0.5 TABLET ORAL at 21:31

## 2019-09-29 RX ADMIN — GUAIFENESIN 600 MG: 600 TABLET, EXTENDED RELEASE ORAL at 09:11

## 2019-09-29 RX ADMIN — POLYETHYLENE GLYCOL 3350 17 G: 17 POWDER, FOR SOLUTION ORAL at 09:12

## 2019-09-29 RX ADMIN — DOCUSATE SODIUM 100 MG: 100 CAPSULE, LIQUID FILLED ORAL at 09:11

## 2019-09-29 RX ADMIN — DOCUSATE SODIUM 100 MG: 100 CAPSULE, LIQUID FILLED ORAL at 23:07

## 2019-09-29 RX ADMIN — ATORVASTATIN CALCIUM 20 MG: 10 TABLET, FILM COATED ORAL at 20:04

## 2019-09-29 RX ADMIN — PREDNISONE 10 MG: 10 TABLET ORAL at 09:10

## 2019-09-29 RX ADMIN — METOPROLOL TARTRATE 25 MG: 25 TABLET, FILM COATED ORAL at 09:11

## 2019-09-29 RX ADMIN — LINEZOLID 600 MG: 600 TABLET, FILM COATED ORAL at 23:07

## 2019-09-29 RX ADMIN — LOSARTAN POTASSIUM 25 MG: 25 TABLET, FILM COATED ORAL at 09:11

## 2019-09-29 RX ADMIN — FAMOTIDINE 20 MG: 20 TABLET ORAL at 09:10

## 2019-09-29 RX ADMIN — GUAIFENESIN 600 MG: 600 TABLET, EXTENDED RELEASE ORAL at 20:03

## 2019-09-29 RX ADMIN — LORAZEPAM 0.5 MG: 0.5 TABLET ORAL at 09:11

## 2019-09-29 RX ADMIN — METOPROLOL TARTRATE 25 MG: 25 TABLET, FILM COATED ORAL at 20:04

## 2019-09-29 RX ADMIN — POTASSIUM CHLORIDE 10 MEQ: 10 TABLET, EXTENDED RELEASE ORAL at 20:08

## 2019-09-29 ASSESSMENT — PAIN SCALES - GENERAL
PAINLEVEL_OUTOF10: 0

## 2019-09-30 LAB
ANION GAP SERPL CALCULATED.3IONS-SCNC: 7 MMOL/L (ref 3–16)
BASOPHILS ABSOLUTE: 0 K/UL (ref 0–0.2)
BASOPHILS RELATIVE PERCENT: 0.4 %
BUN BLDV-MCNC: 21 MG/DL (ref 7–20)
CALCIUM SERPL-MCNC: 9.2 MG/DL (ref 8.3–10.6)
CHLORIDE BLD-SCNC: 106 MMOL/L (ref 99–110)
CO2: 31 MMOL/L (ref 21–32)
CREAT SERPL-MCNC: 1.1 MG/DL (ref 0.6–1.2)
EOSINOPHILS ABSOLUTE: 0.1 K/UL (ref 0–0.6)
EOSINOPHILS RELATIVE PERCENT: 1.4 %
GFR AFRICAN AMERICAN: 58
GFR NON-AFRICAN AMERICAN: 48
GLUCOSE BLD-MCNC: 113 MG/DL (ref 70–99)
HCT VFR BLD CALC: 34.8 % (ref 36–48)
HEMOGLOBIN: 11.7 G/DL (ref 12–16)
LYMPHOCYTES ABSOLUTE: 1.5 K/UL (ref 1–5.1)
LYMPHOCYTES RELATIVE PERCENT: 16.3 %
MAGNESIUM: 2.2 MG/DL (ref 1.8–2.4)
MCH RBC QN AUTO: 32.2 PG (ref 26–34)
MCHC RBC AUTO-ENTMCNC: 33.7 G/DL (ref 31–36)
MCV RBC AUTO: 95.5 FL (ref 80–100)
MONOCYTES ABSOLUTE: 0.5 K/UL (ref 0–1.3)
MONOCYTES RELATIVE PERCENT: 5.6 %
NEUTROPHILS ABSOLUTE: 7.2 K/UL (ref 1.7–7.7)
NEUTROPHILS RELATIVE PERCENT: 76.3 %
PDW BLD-RTO: 14.1 % (ref 12.4–15.4)
PLATELET # BLD: 201 K/UL (ref 135–450)
PMV BLD AUTO: 8.6 FL (ref 5–10.5)
POTASSIUM REFLEX MAGNESIUM: 4.3 MMOL/L (ref 3.5–5.1)
RBC # BLD: 3.65 M/UL (ref 4–5.2)
SODIUM BLD-SCNC: 144 MMOL/L (ref 136–145)
WBC # BLD: 9.5 K/UL (ref 4–11)

## 2019-09-30 PROCEDURE — 94761 N-INVAS EAR/PLS OXIMETRY MLT: CPT

## 2019-09-30 PROCEDURE — 85025 COMPLETE CBC W/AUTO DIFF WBC: CPT

## 2019-09-30 PROCEDURE — 97110 THERAPEUTIC EXERCISES: CPT

## 2019-09-30 PROCEDURE — 36415 COLL VENOUS BLD VENIPUNCTURE: CPT

## 2019-09-30 PROCEDURE — 1280000000 HC REHAB R&B

## 2019-09-30 PROCEDURE — 83735 ASSAY OF MAGNESIUM: CPT

## 2019-09-30 PROCEDURE — 6370000000 HC RX 637 (ALT 250 FOR IP): Performed by: INTERNAL MEDICINE

## 2019-09-30 PROCEDURE — 6370000000 HC RX 637 (ALT 250 FOR IP): Performed by: PHYSICAL MEDICINE & REHABILITATION

## 2019-09-30 PROCEDURE — 97530 THERAPEUTIC ACTIVITIES: CPT

## 2019-09-30 PROCEDURE — 94660 CPAP INITIATION&MGMT: CPT

## 2019-09-30 PROCEDURE — 97535 SELF CARE MNGMENT TRAINING: CPT

## 2019-09-30 PROCEDURE — 97116 GAIT TRAINING THERAPY: CPT

## 2019-09-30 PROCEDURE — 6360000002 HC RX W HCPCS: Performed by: PHYSICAL MEDICINE & REHABILITATION

## 2019-09-30 PROCEDURE — 94640 AIRWAY INHALATION TREATMENT: CPT

## 2019-09-30 PROCEDURE — 80048 BASIC METABOLIC PNL TOTAL CA: CPT

## 2019-09-30 PROCEDURE — 97127 HC SP THER IVNTJ W/FOCUS COG FUNCJ: CPT

## 2019-09-30 PROCEDURE — 2700000000 HC OXYGEN THERAPY PER DAY

## 2019-09-30 RX ORDER — LOPERAMIDE HYDROCHLORIDE 2 MG/1
2 CAPSULE ORAL 4 TIMES DAILY PRN
Status: DISCONTINUED | OUTPATIENT
Start: 2019-09-30 | End: 2019-10-04 | Stop reason: HOSPADM

## 2019-09-30 RX ADMIN — IPRATROPIUM BROMIDE AND ALBUTEROL SULFATE 1 AMPULE: .5; 3 SOLUTION RESPIRATORY (INHALATION) at 19:51

## 2019-09-30 RX ADMIN — LOSARTAN POTASSIUM 25 MG: 25 TABLET, FILM COATED ORAL at 08:31

## 2019-09-30 RX ADMIN — ATORVASTATIN CALCIUM 20 MG: 10 TABLET, FILM COATED ORAL at 19:56

## 2019-09-30 RX ADMIN — METOPROLOL TARTRATE 25 MG: 25 TABLET, FILM COATED ORAL at 19:58

## 2019-09-30 RX ADMIN — METOPROLOL TARTRATE 25 MG: 25 TABLET, FILM COATED ORAL at 08:31

## 2019-09-30 RX ADMIN — LORAZEPAM 0.5 MG: 0.5 TABLET ORAL at 05:02

## 2019-09-30 RX ADMIN — GUAIFENESIN 600 MG: 600 TABLET, EXTENDED RELEASE ORAL at 21:48

## 2019-09-30 RX ADMIN — POTASSIUM CHLORIDE 10 MEQ: 10 TABLET, EXTENDED RELEASE ORAL at 08:31

## 2019-09-30 RX ADMIN — MAGNESIUM GLUCONATE 500 MG ORAL TABLET 400 MG: 500 TABLET ORAL at 08:31

## 2019-09-30 RX ADMIN — LINEZOLID 600 MG: 600 TABLET, FILM COATED ORAL at 19:57

## 2019-09-30 RX ADMIN — GUAIFENESIN 600 MG: 600 TABLET, EXTENDED RELEASE ORAL at 08:31

## 2019-09-30 RX ADMIN — IPRATROPIUM BROMIDE AND ALBUTEROL SULFATE 1 AMPULE: .5; 3 SOLUTION RESPIRATORY (INHALATION) at 16:37

## 2019-09-30 RX ADMIN — FAMOTIDINE 20 MG: 20 TABLET ORAL at 08:31

## 2019-09-30 RX ADMIN — CLOPIDOGREL BISULFATE 75 MG: 75 TABLET ORAL at 08:31

## 2019-09-30 RX ADMIN — LINEZOLID 600 MG: 600 TABLET, FILM COATED ORAL at 08:31

## 2019-09-30 RX ADMIN — BUDESONIDE 500 MCG: 0.5 SUSPENSION RESPIRATORY (INHALATION) at 19:51

## 2019-09-30 RX ADMIN — ASPIRIN 81 MG: 81 TABLET ORAL at 08:31

## 2019-09-30 ASSESSMENT — PAIN DESCRIPTION - LOCATION
LOCATION: STERNUM
LOCATION: CHEST

## 2019-09-30 ASSESSMENT — PAIN SCALES - GENERAL
PAINLEVEL_OUTOF10: 0
PAINLEVEL_OUTOF10: 4
PAINLEVEL_OUTOF10: 0
PAINLEVEL_OUTOF10: 3
PAINLEVEL_OUTOF10: 4
PAINLEVEL_OUTOF10: 0

## 2019-09-30 ASSESSMENT — PAIN DESCRIPTION - PAIN TYPE
TYPE: ACUTE PAIN
TYPE: SURGICAL PAIN

## 2019-09-30 ASSESSMENT — PAIN DESCRIPTION - ORIENTATION: ORIENTATION: MID

## 2019-09-30 ASSESSMENT — PAIN DESCRIPTION - DESCRIPTORS: DESCRIPTORS: ACHING

## 2019-10-01 PROCEDURE — 97530 THERAPEUTIC ACTIVITIES: CPT

## 2019-10-01 PROCEDURE — 1280000000 HC REHAB R&B

## 2019-10-01 PROCEDURE — 97110 THERAPEUTIC EXERCISES: CPT

## 2019-10-01 PROCEDURE — 97535 SELF CARE MNGMENT TRAINING: CPT

## 2019-10-01 PROCEDURE — 97127 HC SP THER IVNTJ W/FOCUS COG FUNCJ: CPT

## 2019-10-01 PROCEDURE — 6370000000 HC RX 637 (ALT 250 FOR IP): Performed by: PHYSICAL MEDICINE & REHABILITATION

## 2019-10-01 PROCEDURE — 97116 GAIT TRAINING THERAPY: CPT

## 2019-10-01 PROCEDURE — 6370000000 HC RX 637 (ALT 250 FOR IP): Performed by: INTERNAL MEDICINE

## 2019-10-01 PROCEDURE — 94660 CPAP INITIATION&MGMT: CPT

## 2019-10-01 PROCEDURE — 6360000002 HC RX W HCPCS: Performed by: PHYSICAL MEDICINE & REHABILITATION

## 2019-10-01 PROCEDURE — 94640 AIRWAY INHALATION TREATMENT: CPT

## 2019-10-01 RX ADMIN — GUAIFENESIN 600 MG: 600 TABLET, EXTENDED RELEASE ORAL at 21:39

## 2019-10-01 RX ADMIN — BUDESONIDE 500 MCG: 0.5 SUSPENSION RESPIRATORY (INHALATION) at 19:02

## 2019-10-01 RX ADMIN — FAMOTIDINE 20 MG: 20 TABLET ORAL at 07:56

## 2019-10-01 RX ADMIN — METOPROLOL TARTRATE 25 MG: 25 TABLET, FILM COATED ORAL at 07:56

## 2019-10-01 RX ADMIN — LINEZOLID 600 MG: 600 TABLET, FILM COATED ORAL at 07:56

## 2019-10-01 RX ADMIN — GUAIFENESIN 600 MG: 600 TABLET, EXTENDED RELEASE ORAL at 07:56

## 2019-10-01 RX ADMIN — IPRATROPIUM BROMIDE AND ALBUTEROL SULFATE 1 AMPULE: .5; 3 SOLUTION RESPIRATORY (INHALATION) at 19:02

## 2019-10-01 RX ADMIN — CLOPIDOGREL BISULFATE 75 MG: 75 TABLET ORAL at 07:56

## 2019-10-01 RX ADMIN — LOPERAMIDE HYDROCHLORIDE 2 MG: 2 CAPSULE ORAL at 07:56

## 2019-10-01 RX ADMIN — ACETAMINOPHEN 650 MG: 325 TABLET ORAL at 06:53

## 2019-10-01 RX ADMIN — LINEZOLID 600 MG: 600 TABLET, FILM COATED ORAL at 21:39

## 2019-10-01 RX ADMIN — LORAZEPAM 0.5 MG: 0.5 TABLET ORAL at 14:31

## 2019-10-01 RX ADMIN — ASPIRIN 81 MG: 81 TABLET ORAL at 07:56

## 2019-10-01 RX ADMIN — METOPROLOL TARTRATE 25 MG: 25 TABLET, FILM COATED ORAL at 21:39

## 2019-10-01 RX ADMIN — LORAZEPAM 0.5 MG: 0.5 TABLET ORAL at 02:17

## 2019-10-01 RX ADMIN — ATORVASTATIN CALCIUM 20 MG: 10 TABLET, FILM COATED ORAL at 21:39

## 2019-10-01 RX ADMIN — LOSARTAN POTASSIUM 25 MG: 25 TABLET, FILM COATED ORAL at 07:56

## 2019-10-01 ASSESSMENT — PAIN SCALES - GENERAL
PAINLEVEL_OUTOF10: 0
PAINLEVEL_OUTOF10: 4
PAINLEVEL_OUTOF10: 0
PAINLEVEL_OUTOF10: 0

## 2019-10-02 PROCEDURE — 97535 SELF CARE MNGMENT TRAINING: CPT

## 2019-10-02 PROCEDURE — 94761 N-INVAS EAR/PLS OXIMETRY MLT: CPT

## 2019-10-02 PROCEDURE — 1280000000 HC REHAB R&B

## 2019-10-02 PROCEDURE — 6370000000 HC RX 637 (ALT 250 FOR IP): Performed by: INTERNAL MEDICINE

## 2019-10-02 PROCEDURE — 97110 THERAPEUTIC EXERCISES: CPT

## 2019-10-02 PROCEDURE — 6370000000 HC RX 637 (ALT 250 FOR IP): Performed by: PHYSICAL MEDICINE & REHABILITATION

## 2019-10-02 PROCEDURE — 97116 GAIT TRAINING THERAPY: CPT

## 2019-10-02 PROCEDURE — 97127 HC SP THER IVNTJ W/FOCUS COG FUNCJ: CPT

## 2019-10-02 PROCEDURE — 2700000000 HC OXYGEN THERAPY PER DAY

## 2019-10-02 PROCEDURE — 97530 THERAPEUTIC ACTIVITIES: CPT

## 2019-10-02 PROCEDURE — 94640 AIRWAY INHALATION TREATMENT: CPT

## 2019-10-02 PROCEDURE — 6360000002 HC RX W HCPCS: Performed by: PHYSICAL MEDICINE & REHABILITATION

## 2019-10-02 RX ADMIN — FAMOTIDINE 20 MG: 20 TABLET ORAL at 08:08

## 2019-10-02 RX ADMIN — BUDESONIDE 500 MCG: 0.5 SUSPENSION RESPIRATORY (INHALATION) at 07:51

## 2019-10-02 RX ADMIN — IPRATROPIUM BROMIDE AND ALBUTEROL SULFATE 1 AMPULE: .5; 3 SOLUTION RESPIRATORY (INHALATION) at 07:51

## 2019-10-02 RX ADMIN — IPRATROPIUM BROMIDE AND ALBUTEROL SULFATE 1 AMPULE: .5; 3 SOLUTION RESPIRATORY (INHALATION) at 19:49

## 2019-10-02 RX ADMIN — ASPIRIN 81 MG: 81 TABLET ORAL at 08:08

## 2019-10-02 RX ADMIN — METOPROLOL TARTRATE 25 MG: 25 TABLET, FILM COATED ORAL at 21:02

## 2019-10-02 RX ADMIN — ONDANSETRON 8 MG: 4 TABLET, ORALLY DISINTEGRATING ORAL at 11:26

## 2019-10-02 RX ADMIN — LINEZOLID 600 MG: 600 TABLET, FILM COATED ORAL at 21:02

## 2019-10-02 RX ADMIN — GUAIFENESIN 600 MG: 600 TABLET, EXTENDED RELEASE ORAL at 21:02

## 2019-10-02 RX ADMIN — BUDESONIDE 500 MCG: 0.5 SUSPENSION RESPIRATORY (INHALATION) at 19:49

## 2019-10-02 RX ADMIN — METOPROLOL TARTRATE 25 MG: 25 TABLET, FILM COATED ORAL at 08:08

## 2019-10-02 RX ADMIN — ATORVASTATIN CALCIUM 20 MG: 10 TABLET, FILM COATED ORAL at 21:02

## 2019-10-02 RX ADMIN — LOSARTAN POTASSIUM 25 MG: 25 TABLET, FILM COATED ORAL at 08:09

## 2019-10-02 RX ADMIN — LINEZOLID 600 MG: 600 TABLET, FILM COATED ORAL at 08:09

## 2019-10-02 RX ADMIN — IPRATROPIUM BROMIDE AND ALBUTEROL SULFATE 1 AMPULE: .5; 3 SOLUTION RESPIRATORY (INHALATION) at 00:26

## 2019-10-02 RX ADMIN — CLOPIDOGREL BISULFATE 75 MG: 75 TABLET ORAL at 08:08

## 2019-10-02 RX ADMIN — GUAIFENESIN 600 MG: 600 TABLET, EXTENDED RELEASE ORAL at 08:08

## 2019-10-02 ASSESSMENT — PAIN SCALES - GENERAL
PAINLEVEL_OUTOF10: 0

## 2019-10-03 LAB
ANION GAP SERPL CALCULATED.3IONS-SCNC: 15 MMOL/L (ref 3–16)
BASOPHILS ABSOLUTE: 0.1 K/UL (ref 0–0.2)
BASOPHILS RELATIVE PERCENT: 0.7 %
BUN BLDV-MCNC: 26 MG/DL (ref 7–20)
CALCIUM SERPL-MCNC: 9.8 MG/DL (ref 8.3–10.6)
CHLORIDE BLD-SCNC: 104 MMOL/L (ref 99–110)
CO2: 24 MMOL/L (ref 21–32)
CREAT SERPL-MCNC: 1.2 MG/DL (ref 0.6–1.2)
EOSINOPHILS ABSOLUTE: 0.1 K/UL (ref 0–0.6)
EOSINOPHILS RELATIVE PERCENT: 1.4 %
GFR AFRICAN AMERICAN: 53
GFR NON-AFRICAN AMERICAN: 43
GLUCOSE BLD-MCNC: 110 MG/DL (ref 70–99)
HCT VFR BLD CALC: 39.1 % (ref 36–48)
HEMOGLOBIN: 13 G/DL (ref 12–16)
LYMPHOCYTES ABSOLUTE: 1.3 K/UL (ref 1–5.1)
LYMPHOCYTES RELATIVE PERCENT: 13.4 %
MAGNESIUM: 2.2 MG/DL (ref 1.8–2.4)
MCH RBC QN AUTO: 32.5 PG (ref 26–34)
MCHC RBC AUTO-ENTMCNC: 33.2 G/DL (ref 31–36)
MCV RBC AUTO: 98 FL (ref 80–100)
MONOCYTES ABSOLUTE: 0.4 K/UL (ref 0–1.3)
MONOCYTES RELATIVE PERCENT: 4.7 %
NEUTROPHILS ABSOLUTE: 7.6 K/UL (ref 1.7–7.7)
NEUTROPHILS RELATIVE PERCENT: 79.8 %
PDW BLD-RTO: 14.6 % (ref 12.4–15.4)
PLATELET # BLD: 172 K/UL (ref 135–450)
PMV BLD AUTO: 8.1 FL (ref 5–10.5)
POTASSIUM REFLEX MAGNESIUM: 5 MMOL/L (ref 3.5–5.1)
RBC # BLD: 3.99 M/UL (ref 4–5.2)
SODIUM BLD-SCNC: 143 MMOL/L (ref 136–145)
WBC # BLD: 9.5 K/UL (ref 4–11)

## 2019-10-03 PROCEDURE — 6370000000 HC RX 637 (ALT 250 FOR IP): Performed by: PHYSICAL MEDICINE & REHABILITATION

## 2019-10-03 PROCEDURE — 97127 HC SP THER IVNTJ W/FOCUS COG FUNCJ: CPT

## 2019-10-03 PROCEDURE — 1280000000 HC REHAB R&B

## 2019-10-03 PROCEDURE — 85025 COMPLETE CBC W/AUTO DIFF WBC: CPT

## 2019-10-03 PROCEDURE — 83735 ASSAY OF MAGNESIUM: CPT

## 2019-10-03 PROCEDURE — 97116 GAIT TRAINING THERAPY: CPT

## 2019-10-03 PROCEDURE — 97110 THERAPEUTIC EXERCISES: CPT

## 2019-10-03 PROCEDURE — 94761 N-INVAS EAR/PLS OXIMETRY MLT: CPT

## 2019-10-03 PROCEDURE — 94640 AIRWAY INHALATION TREATMENT: CPT

## 2019-10-03 PROCEDURE — 6360000002 HC RX W HCPCS: Performed by: PHYSICAL MEDICINE & REHABILITATION

## 2019-10-03 PROCEDURE — 80048 BASIC METABOLIC PNL TOTAL CA: CPT

## 2019-10-03 PROCEDURE — 36415 COLL VENOUS BLD VENIPUNCTURE: CPT

## 2019-10-03 PROCEDURE — 6370000000 HC RX 637 (ALT 250 FOR IP): Performed by: INTERNAL MEDICINE

## 2019-10-03 PROCEDURE — 97530 THERAPEUTIC ACTIVITIES: CPT

## 2019-10-03 PROCEDURE — 97535 SELF CARE MNGMENT TRAINING: CPT

## 2019-10-03 PROCEDURE — 94660 CPAP INITIATION&MGMT: CPT

## 2019-10-03 PROCEDURE — 2700000000 HC OXYGEN THERAPY PER DAY

## 2019-10-03 RX ORDER — OXYCODONE HYDROCHLORIDE 5 MG/1
5 TABLET ORAL EVERY 4 HOURS PRN
Qty: 28 TABLET | Refills: 0 | Status: SHIPPED | OUTPATIENT
Start: 2019-10-03 | End: 2019-10-10

## 2019-10-03 RX ADMIN — GUAIFENESIN 600 MG: 600 TABLET, EXTENDED RELEASE ORAL at 20:58

## 2019-10-03 RX ADMIN — ACETAMINOPHEN 325 MG: 325 TABLET ORAL at 21:09

## 2019-10-03 RX ADMIN — FAMOTIDINE 20 MG: 20 TABLET ORAL at 08:04

## 2019-10-03 RX ADMIN — ONDANSETRON 4 MG: 4 TABLET, ORALLY DISINTEGRATING ORAL at 10:47

## 2019-10-03 RX ADMIN — LOSARTAN POTASSIUM 25 MG: 25 TABLET, FILM COATED ORAL at 08:05

## 2019-10-03 RX ADMIN — METOPROLOL TARTRATE 25 MG: 25 TABLET, FILM COATED ORAL at 20:58

## 2019-10-03 RX ADMIN — METOPROLOL TARTRATE 25 MG: 25 TABLET, FILM COATED ORAL at 08:04

## 2019-10-03 RX ADMIN — ATORVASTATIN CALCIUM 20 MG: 10 TABLET, FILM COATED ORAL at 20:58

## 2019-10-03 RX ADMIN — BUDESONIDE 500 MCG: 0.5 SUSPENSION RESPIRATORY (INHALATION) at 20:36

## 2019-10-03 RX ADMIN — LINEZOLID 600 MG: 600 TABLET, FILM COATED ORAL at 20:58

## 2019-10-03 RX ADMIN — CLOPIDOGREL BISULFATE 75 MG: 75 TABLET ORAL at 08:04

## 2019-10-03 RX ADMIN — BUDESONIDE 500 MCG: 0.5 SUSPENSION RESPIRATORY (INHALATION) at 07:29

## 2019-10-03 RX ADMIN — IPRATROPIUM BROMIDE AND ALBUTEROL SULFATE 1 AMPULE: .5; 3 SOLUTION RESPIRATORY (INHALATION) at 20:25

## 2019-10-03 RX ADMIN — GUAIFENESIN 600 MG: 600 TABLET, EXTENDED RELEASE ORAL at 08:04

## 2019-10-03 RX ADMIN — LINEZOLID 600 MG: 600 TABLET, FILM COATED ORAL at 08:04

## 2019-10-03 RX ADMIN — ASPIRIN 81 MG: 81 TABLET ORAL at 08:05

## 2019-10-03 RX ADMIN — IPRATROPIUM BROMIDE AND ALBUTEROL SULFATE 1 AMPULE: .5; 3 SOLUTION RESPIRATORY (INHALATION) at 07:21

## 2019-10-03 ASSESSMENT — PAIN DESCRIPTION - ORIENTATION
ORIENTATION: RIGHT
ORIENTATION: MID

## 2019-10-03 ASSESSMENT — PAIN SCALES - GENERAL
PAINLEVEL_OUTOF10: 4
PAINLEVEL_OUTOF10: 4
PAINLEVEL_OUTOF10: 0
PAINLEVEL_OUTOF10: 4

## 2019-10-03 ASSESSMENT — PAIN DESCRIPTION - LOCATION
LOCATION: CHEST
LOCATION: SHOULDER
LOCATION: STERNUM
LOCATION: CHEST

## 2019-10-03 ASSESSMENT — PAIN DESCRIPTION - DESCRIPTORS: DESCRIPTORS: ACHING;DISCOMFORT

## 2019-10-03 ASSESSMENT — PAIN DESCRIPTION - FREQUENCY: FREQUENCY: INTERMITTENT

## 2019-10-03 ASSESSMENT — PAIN DESCRIPTION - PAIN TYPE
TYPE: SURGICAL PAIN

## 2019-10-03 ASSESSMENT — PAIN DESCRIPTION - ONSET: ONSET: ON-GOING

## 2019-10-03 ASSESSMENT — PAIN DESCRIPTION - PROGRESSION: CLINICAL_PROGRESSION: NOT CHANGED

## 2019-10-03 ASSESSMENT — PAIN - FUNCTIONAL ASSESSMENT: PAIN_FUNCTIONAL_ASSESSMENT: ACTIVITIES ARE NOT PREVENTED

## 2019-10-04 VITALS
WEIGHT: 103.4 LBS | HEART RATE: 104 BPM | BODY MASS INDEX: 17.65 KG/M2 | RESPIRATION RATE: 18 BRPM | TEMPERATURE: 97.3 F | HEIGHT: 64 IN | DIASTOLIC BLOOD PRESSURE: 68 MMHG | OXYGEN SATURATION: 94 % | SYSTOLIC BLOOD PRESSURE: 129 MMHG

## 2019-10-04 PROCEDURE — G0008 ADMIN INFLUENZA VIRUS VAC: HCPCS | Performed by: PHYSICAL MEDICINE & REHABILITATION

## 2019-10-04 PROCEDURE — 6370000000 HC RX 637 (ALT 250 FOR IP): Performed by: PHYSICAL MEDICINE & REHABILITATION

## 2019-10-04 PROCEDURE — 90686 IIV4 VACC NO PRSV 0.5 ML IM: CPT | Performed by: PHYSICAL MEDICINE & REHABILITATION

## 2019-10-04 PROCEDURE — 6370000000 HC RX 637 (ALT 250 FOR IP): Performed by: INTERNAL MEDICINE

## 2019-10-04 PROCEDURE — 99232 SBSQ HOSP IP/OBS MODERATE 35: CPT | Performed by: INTERNAL MEDICINE

## 2019-10-04 PROCEDURE — 6360000002 HC RX W HCPCS: Performed by: PHYSICAL MEDICINE & REHABILITATION

## 2019-10-04 PROCEDURE — 2700000000 HC OXYGEN THERAPY PER DAY

## 2019-10-04 PROCEDURE — 94640 AIRWAY INHALATION TREATMENT: CPT

## 2019-10-04 PROCEDURE — 94761 N-INVAS EAR/PLS OXIMETRY MLT: CPT

## 2019-10-04 RX ADMIN — FAMOTIDINE 20 MG: 20 TABLET ORAL at 08:54

## 2019-10-04 RX ADMIN — ACETAMINOPHEN 325 MG: 325 TABLET ORAL at 03:15

## 2019-10-04 RX ADMIN — GUAIFENESIN 600 MG: 600 TABLET, EXTENDED RELEASE ORAL at 08:54

## 2019-10-04 RX ADMIN — INFLUENZA A VIRUS A/BRISBANE/02/2018 IVR-190 (H1N1) ANTIGEN (PROPIOLACTONE INACTIVATED), INFLUENZA A VIRUS A/KANSAS/14/2017 X-327 (H3N2) ANTIGEN (PROPIOLACTONE INACTIVATED), INFLUENZA B VIRUS B/MARYLAND/15/2016 ANTIGEN (PROPIOLACTONE INACTIVATED), INFLUENZA B VIRUS B/PHUKET/3073/2013 BVR-1B ANTIGEN (PROPIOLACTONE INACTIVATED) 0.5 ML: 15; 15; 15; 15 INJECTION, SUSPENSION INTRAMUSCULAR at 09:00

## 2019-10-04 RX ADMIN — IPRATROPIUM BROMIDE AND ALBUTEROL SULFATE 1 AMPULE: .5; 3 SOLUTION RESPIRATORY (INHALATION) at 08:14

## 2019-10-04 RX ADMIN — BUDESONIDE 500 MCG: 0.5 SUSPENSION RESPIRATORY (INHALATION) at 08:19

## 2019-10-04 RX ADMIN — CLOPIDOGREL BISULFATE 75 MG: 75 TABLET ORAL at 08:53

## 2019-10-04 RX ADMIN — LINEZOLID 600 MG: 600 TABLET, FILM COATED ORAL at 08:54

## 2019-10-04 RX ADMIN — METOPROLOL TARTRATE 25 MG: 25 TABLET, FILM COATED ORAL at 08:54

## 2019-10-04 RX ADMIN — ASPIRIN 81 MG: 81 TABLET ORAL at 08:54

## 2019-10-04 RX ADMIN — DOCUSATE SODIUM 100 MG: 100 CAPSULE, LIQUID FILLED ORAL at 08:54

## 2019-10-04 RX ADMIN — LOSARTAN POTASSIUM 25 MG: 25 TABLET, FILM COATED ORAL at 08:53

## 2019-10-04 RX ADMIN — ACETAMINOPHEN 650 MG: 325 TABLET ORAL at 08:54

## 2019-10-04 ASSESSMENT — PAIN SCALES - GENERAL
PAINLEVEL_OUTOF10: 5
PAINLEVEL_OUTOF10: 0

## 2019-10-04 ASSESSMENT — PAIN DESCRIPTION - ORIENTATION: ORIENTATION: RIGHT;LEFT

## 2019-10-04 ASSESSMENT — PAIN DESCRIPTION - PAIN TYPE: TYPE: SURGICAL PAIN

## 2019-10-04 ASSESSMENT — PAIN DESCRIPTION - DESCRIPTORS: DESCRIPTORS: ACHING;DISCOMFORT

## 2019-10-04 ASSESSMENT — PAIN DESCRIPTION - LOCATION: LOCATION: CHEST;ARM

## 2019-10-08 ENCOUNTER — TELEPHONE (OUTPATIENT)
Dept: CARDIOTHORACIC SURGERY | Age: 79
End: 2019-10-08

## 2019-10-08 PROBLEM — R77.8 ELEVATED TROPONIN: Status: RESOLVED | Noted: 2019-09-04 | Resolved: 2019-10-08

## 2019-10-08 PROBLEM — R79.89 ELEVATED TROPONIN: Status: RESOLVED | Noted: 2019-09-04 | Resolved: 2019-10-08

## 2019-10-14 ENCOUNTER — OFFICE VISIT (OUTPATIENT)
Dept: CARDIOTHORACIC SURGERY | Age: 79
End: 2019-10-14

## 2019-10-14 VITALS
WEIGHT: 104.8 LBS | DIASTOLIC BLOOD PRESSURE: 86 MMHG | BODY MASS INDEX: 16.45 KG/M2 | HEIGHT: 67 IN | TEMPERATURE: 97.5 F | SYSTOLIC BLOOD PRESSURE: 130 MMHG | OXYGEN SATURATION: 95 % | HEART RATE: 100 BPM

## 2019-10-14 DIAGNOSIS — J44.1 COPD WITH ACUTE EXACERBATION (HCC): ICD-10-CM

## 2019-10-14 DIAGNOSIS — Z95.1 S/P CABG (CORONARY ARTERY BYPASS GRAFT): ICD-10-CM

## 2019-10-14 DIAGNOSIS — I21.4 NSTEMI (NON-ST ELEVATED MYOCARDIAL INFARCTION) (HCC): Primary | ICD-10-CM

## 2019-10-14 PROCEDURE — 99024 POSTOP FOLLOW-UP VISIT: CPT | Performed by: THORACIC SURGERY (CARDIOTHORACIC VASCULAR SURGERY)

## 2019-10-14 RX ORDER — ACETAMINOPHEN 500 MG
500 TABLET ORAL EVERY 6 HOURS PRN
COMMUNITY

## 2019-10-14 RX ORDER — ALPRAZOLAM 0.25 MG/1
0.25 TABLET ORAL 3 TIMES DAILY PRN
Status: ON HOLD | COMMUNITY
End: 2021-06-04 | Stop reason: HOSPADM

## 2019-10-22 ENCOUNTER — OFFICE VISIT (OUTPATIENT)
Dept: CARDIOLOGY CLINIC | Age: 79
End: 2019-10-22
Payer: MEDICARE

## 2019-10-22 VITALS
HEART RATE: 60 BPM | DIASTOLIC BLOOD PRESSURE: 60 MMHG | BODY MASS INDEX: 16.45 KG/M2 | WEIGHT: 105 LBS | SYSTOLIC BLOOD PRESSURE: 114 MMHG

## 2019-10-22 DIAGNOSIS — Z95.1 HX OF CABG: ICD-10-CM

## 2019-10-22 DIAGNOSIS — I25.10 CAD IN NATIVE ARTERY: Primary | ICD-10-CM

## 2019-10-22 DIAGNOSIS — I21.4 NON-ST ELEVATED MYOCARDIAL INFARCTION (NON-STEMI) (HCC): ICD-10-CM

## 2019-10-22 PROCEDURE — 1123F ACP DISCUSS/DSCN MKR DOCD: CPT | Performed by: INTERNAL MEDICINE

## 2019-10-22 PROCEDURE — 1111F DSCHRG MED/CURRENT MED MERGE: CPT | Performed by: INTERNAL MEDICINE

## 2019-10-22 PROCEDURE — G8482 FLU IMMUNIZE ORDER/ADMIN: HCPCS | Performed by: INTERNAL MEDICINE

## 2019-10-22 PROCEDURE — 1036F TOBACCO NON-USER: CPT | Performed by: INTERNAL MEDICINE

## 2019-10-22 PROCEDURE — 1090F PRES/ABSN URINE INCON ASSESS: CPT | Performed by: INTERNAL MEDICINE

## 2019-10-22 PROCEDURE — 4040F PNEUMOC VAC/ADMIN/RCVD: CPT | Performed by: INTERNAL MEDICINE

## 2019-10-22 PROCEDURE — G8400 PT W/DXA NO RESULTS DOC: HCPCS | Performed by: INTERNAL MEDICINE

## 2019-10-22 PROCEDURE — 99214 OFFICE O/P EST MOD 30 MIN: CPT | Performed by: INTERNAL MEDICINE

## 2019-10-22 PROCEDURE — G8598 ASA/ANTIPLAT THER USED: HCPCS | Performed by: INTERNAL MEDICINE

## 2019-10-22 PROCEDURE — G8427 DOCREV CUR MEDS BY ELIG CLIN: HCPCS | Performed by: INTERNAL MEDICINE

## 2019-10-22 PROCEDURE — G8419 CALC BMI OUT NRM PARAM NOF/U: HCPCS | Performed by: INTERNAL MEDICINE

## 2019-10-22 ASSESSMENT — ENCOUNTER SYMPTOMS
CHOKING: 0
COUGH: 0
SHORTNESS OF BREATH: 0
CHEST TIGHTNESS: 0

## 2019-10-31 ENCOUNTER — HOSPITAL ENCOUNTER (EMERGENCY)
Age: 79
Discharge: HOME | End: 2019-10-31
Payer: MEDICARE

## 2019-10-31 ENCOUNTER — APPOINTMENT (OUTPATIENT)
Dept: GENERAL RADIOLOGY | Age: 79
End: 2019-10-31
Payer: MEDICARE

## 2019-10-31 VITALS
OXYGEN SATURATION: 100 % | TEMPERATURE: 98.2 F | HEART RATE: 96 BPM | SYSTOLIC BLOOD PRESSURE: 153 MMHG | BODY MASS INDEX: 16.13 KG/M2 | RESPIRATION RATE: 19 BRPM | DIASTOLIC BLOOD PRESSURE: 76 MMHG | WEIGHT: 103 LBS

## 2019-10-31 DIAGNOSIS — M79.604 RIGHT LEG PAIN: Primary | ICD-10-CM

## 2019-10-31 DIAGNOSIS — F41.1 ANXIETY STATE: ICD-10-CM

## 2019-10-31 LAB
A/G RATIO: 1.1 (ref 1.1–2.2)
ALBUMIN SERPL-MCNC: 3.9 G/DL (ref 3.4–5)
ALP BLD-CCNC: 106 U/L (ref 40–129)
ALT SERPL-CCNC: 9 U/L (ref 10–40)
ANION GAP SERPL CALCULATED.3IONS-SCNC: 16 MMOL/L (ref 3–16)
AST SERPL-CCNC: 17 U/L (ref 15–37)
BASOPHILS ABSOLUTE: 0 K/UL (ref 0–0.2)
BASOPHILS RELATIVE PERCENT: 0.5 %
BILIRUB SERPL-MCNC: 0.3 MG/DL (ref 0–1)
BUN BLDV-MCNC: 9 MG/DL (ref 7–20)
CALCIUM SERPL-MCNC: 9.4 MG/DL (ref 8.3–10.6)
CHLORIDE BLD-SCNC: 101 MMOL/L (ref 99–110)
CO2: 29 MMOL/L (ref 21–32)
CREAT SERPL-MCNC: 1.1 MG/DL (ref 0.6–1.2)
EOSINOPHILS ABSOLUTE: 0.2 K/UL (ref 0–0.6)
EOSINOPHILS RELATIVE PERCENT: 2.4 %
GFR AFRICAN AMERICAN: 58
GFR NON-AFRICAN AMERICAN: 48
GLOBULIN: 3.6 G/DL
GLUCOSE BLD-MCNC: 117 MG/DL (ref 70–99)
HCT VFR BLD CALC: 36.6 % (ref 36–48)
HEMOGLOBIN: 12.6 G/DL (ref 12–16)
LYMPHOCYTES ABSOLUTE: 2.1 K/UL (ref 1–5.1)
LYMPHOCYTES RELATIVE PERCENT: 25.2 %
MCH RBC QN AUTO: 34 PG (ref 26–34)
MCHC RBC AUTO-ENTMCNC: 34.5 G/DL (ref 31–36)
MCV RBC AUTO: 98.4 FL (ref 80–100)
MONOCYTES ABSOLUTE: 0.7 K/UL (ref 0–1.3)
MONOCYTES RELATIVE PERCENT: 7.8 %
NEUTROPHILS ABSOLUTE: 5.4 K/UL (ref 1.7–7.7)
NEUTROPHILS RELATIVE PERCENT: 64.1 %
PDW BLD-RTO: 15.9 % (ref 12.4–15.4)
PLATELET # BLD: 239 K/UL (ref 135–450)
PMV BLD AUTO: 9 FL (ref 5–10.5)
POTASSIUM SERPL-SCNC: 4.3 MMOL/L (ref 3.5–5.1)
PRO-BNP: 1400 PG/ML (ref 0–449)
RBC # BLD: 3.72 M/UL (ref 4–5.2)
SODIUM BLD-SCNC: 146 MMOL/L (ref 136–145)
TOTAL PROTEIN: 7.5 G/DL (ref 6.4–8.2)
WBC # BLD: 8.4 K/UL (ref 4–11)

## 2019-10-31 PROCEDURE — 93971 EXTREMITY STUDY: CPT

## 2019-10-31 PROCEDURE — 99284 EMERGENCY DEPT VISIT MOD MDM: CPT

## 2019-10-31 PROCEDURE — 71046 X-RAY EXAM CHEST 2 VIEWS: CPT

## 2019-10-31 PROCEDURE — 85025 COMPLETE CBC W/AUTO DIFF WBC: CPT

## 2019-10-31 PROCEDURE — 6370000000 HC RX 637 (ALT 250 FOR IP): Performed by: NURSE PRACTITIONER

## 2019-10-31 PROCEDURE — 83880 ASSAY OF NATRIURETIC PEPTIDE: CPT

## 2019-10-31 PROCEDURE — 80053 COMPREHEN METABOLIC PANEL: CPT

## 2019-10-31 RX ORDER — HYDROXYZINE PAMOATE 25 MG/1
50 CAPSULE ORAL ONCE
Status: COMPLETED | OUTPATIENT
Start: 2019-10-31 | End: 2019-10-31

## 2019-10-31 RX ORDER — HYDROXYZINE 50 MG/1
25 TABLET, FILM COATED ORAL EVERY 6 HOURS PRN
Qty: 20 TABLET | Refills: 0 | Status: SHIPPED | OUTPATIENT
Start: 2019-10-31 | End: 2019-11-10

## 2019-10-31 RX ORDER — ONDANSETRON 4 MG/1
4 TABLET, ORALLY DISINTEGRATING ORAL ONCE
Status: COMPLETED | OUTPATIENT
Start: 2019-10-31 | End: 2019-10-31

## 2019-10-31 RX ADMIN — ONDANSETRON 4 MG: 4 TABLET, ORALLY DISINTEGRATING ORAL at 17:10

## 2019-10-31 RX ADMIN — HYDROXYZINE PAMOATE 50 MG: 25 CAPSULE ORAL at 16:21

## 2019-10-31 ASSESSMENT — PAIN SCALES - GENERAL: PAINLEVEL_OUTOF10: 5

## 2019-10-31 ASSESSMENT — PAIN DESCRIPTION - LOCATION: LOCATION: FOOT

## 2019-10-31 ASSESSMENT — PAIN DESCRIPTION - ORIENTATION: ORIENTATION: RIGHT

## 2019-11-01 ENCOUNTER — TELEPHONE (OUTPATIENT)
Dept: OTHER | Facility: CLINIC | Age: 79
End: 2019-11-01

## 2020-01-27 NOTE — H&P
AMLODIPINE BESYLATE 10 MG Oral Tab    Passed Protocol    Last OV: 11/18/2019  Last refill date: 10/28/2019     #/refills: #90 w/ 0 refills   When pt was asked to return for OV: 6 months   Upcoming appt/reason: no future appointments   Lab Results   [facility]  PGY 2  History & Physical      CC dyspnea    History Obtained From:  patient    HISTORY OF PRESENT ILLNESS: 68 yo F PMH COPD, chronic hypoxic respiratory failure on home O2 therapy, tobacco use disorder p/w an episode of dyspnea from [employer]. Early the morning of 9/3/2019 the patient felt congested and broke a Mucinex in half to swallow, but the second part of the pill became stuck in her throat and was painful. Subsequently the pt became dyspneic, requiring an increase from a baseline of 2L to 3L O2. The pain was described as tight and located over the right side of the chest. This was transient, lasting only minutes and resolved, however her congestion continued. Pt endorsed an increase of mucous without cough. Currently no complaints of fever, chills, headache, lightheadedness, dizziness, dyspnea, chest pain, abdominal pain, N/V/D/C. Past Medical History:        Diagnosis Date    Asthma     [facility]     Hyperlipidemia     Hypertension     OA (osteoarthritis) 2014   ·     Past Surgical History:        Procedure Laterality Date    BREAST SURGERY      half of right breast removed     SECTION     ·     Medications Priorto Admission:    No current facility-administered medications on file prior to encounter. Current Outpatient Medications on File Prior to Encounter   Medication Sig Dispense Refill    tiotropium (SPIRIVA HANDIHALER) 18 MCG inhalation capsule Inhale 18 mcg into the lungs daily      albuterol (PROVENTIL) (2.5 MG/3ML) 0.083% nebulizer solution VVN QID PRN SEVERE WHZ  2    budesonide-formoterol (SYMBICORT) 160-4.5 MCG/ACT AERO Inhale 2 puffs into the lungs 2 times daily      albuterol (PROVENTIL HFA) 108 (90 BASE) MCG/ACT inhaler Inhale 2 puffs into the lungs every 6 hours as needed for Wheezing or Shortness of Breath.  1 Inhaler 2   ·     Allergies:  Codeine    Social History:   · TOBACCO:   reports that

## 2020-02-03 ENCOUNTER — OFFICE VISIT (OUTPATIENT)
Dept: CARDIOLOGY CLINIC | Age: 80
End: 2020-02-03
Payer: MEDICARE

## 2020-02-03 VITALS
BODY MASS INDEX: 14.88 KG/M2 | DIASTOLIC BLOOD PRESSURE: 70 MMHG | HEART RATE: 68 BPM | WEIGHT: 95 LBS | SYSTOLIC BLOOD PRESSURE: 100 MMHG

## 2020-02-03 PROCEDURE — G8419 CALC BMI OUT NRM PARAM NOF/U: HCPCS | Performed by: INTERNAL MEDICINE

## 2020-02-03 PROCEDURE — 99214 OFFICE O/P EST MOD 30 MIN: CPT | Performed by: INTERNAL MEDICINE

## 2020-02-03 PROCEDURE — G8482 FLU IMMUNIZE ORDER/ADMIN: HCPCS | Performed by: INTERNAL MEDICINE

## 2020-02-03 PROCEDURE — 1123F ACP DISCUSS/DSCN MKR DOCD: CPT | Performed by: INTERNAL MEDICINE

## 2020-02-03 PROCEDURE — 1036F TOBACCO NON-USER: CPT | Performed by: INTERNAL MEDICINE

## 2020-02-03 PROCEDURE — G8427 DOCREV CUR MEDS BY ELIG CLIN: HCPCS | Performed by: INTERNAL MEDICINE

## 2020-02-03 PROCEDURE — 4040F PNEUMOC VAC/ADMIN/RCVD: CPT | Performed by: INTERNAL MEDICINE

## 2020-02-03 PROCEDURE — 1090F PRES/ABSN URINE INCON ASSESS: CPT | Performed by: INTERNAL MEDICINE

## 2020-02-03 PROCEDURE — G8400 PT W/DXA NO RESULTS DOC: HCPCS | Performed by: INTERNAL MEDICINE

## 2020-02-03 ASSESSMENT — ENCOUNTER SYMPTOMS
CHOKING: 0
CHEST TIGHTNESS: 0
COUGH: 0
SHORTNESS OF BREATH: 0

## 2020-02-03 NOTE — PROGRESS NOTES
Lifestyle    Physical activity:     Days per week: Not on file     Minutes per session: Not on file    Stress: Not on file   Relationships    Social connections:     Talks on phone: Not on file     Gets together: Not on file     Attends Taoist service: Not on file     Active member of club or organization: Not on file     Attends meetings of clubs or organizations: Not on file     Relationship status: Not on file    Intimate partner violence:     Fear of current or ex partner: Not on file     Emotionally abused: Not on file     Physically abused: Not on file     Forced sexual activity: Not on file   Other Topics Concern    Not on file   Social History Narrative    Not on file     FH reviewed, Fa/sist heart disease    Vitals:    02/03/20 1511   BP: 100/70   Pulse: 68     Wt 95        Review of Systems   Constitutional: Negative for activity change, appetite change and fatigue. Respiratory: Negative for cough, choking, chest tightness and shortness of breath. Cardiovascular: Negative for chest pain, palpitations and leg swelling. Denies PND or orthopnea. No tachycardia or syncope. Neurological: Negative for dizziness, syncope and light-headedness. Psychiatric/Behavioral: Negative for agitation, behavioral problems and confusion. All other systems reviewed and are negative. Objective:   Physical Exam  Constitutional:       General: She is not in acute distress. Appearance: She is well-developed. HENT:      Head: Normocephalic and atraumatic. Eyes:      General:         Right eye: No discharge. Left eye: No discharge. Neck:      Musculoskeletal: Normal range of motion. Vascular: No JVD. Cardiovascular:      Rate and Rhythm: Normal rate and regular rhythm. Heart sounds: Normal heart sounds. No murmur. No gallop. Pulmonary:      Effort: Pulmonary effort is normal. No respiratory distress. Breath sounds: Normal breath sounds. No stridor.  No wheezing or rales. Abdominal:      General: Bowel sounds are normal.      Palpations: Abdomen is soft. Tenderness: There is no abdominal tenderness. Musculoskeletal: Normal range of motion. Skin:     General: Skin is warm and dry. Neurological:      Mental Status: She is alert and oriented to person, place, and time. Psychiatric:         Behavior: Behavior normal.         Thought Content: Thought content normal.         Assessment:       Diagnosis Orders   1. CAD in native artery     2. Hx of CABG     3. Non-ST elevated myocardial infarction (non-STEMI) ([facility]             Plan:      CV stable post CABG. Compensated. Not smoking. Reviewed previous records and testing including hosp/rehab after CABG. No changes. Continue to monitor. Follow up 3 months.          Sita Ward MD

## 2020-02-23 ENCOUNTER — HOSPITAL ENCOUNTER (INPATIENT)
Age: 80
LOS: 4 days | Discharge: HOME | DRG: 393 | End: 2020-02-27
Attending: EMERGENCY MEDICINE | Admitting: INTERNAL MEDICINE
Payer: MEDICARE

## 2020-02-23 PROBLEM — K92.2 GI BLEED: Status: ACTIVE | Noted: 2020-02-23

## 2020-02-23 LAB
ABO/RH: NORMAL
ANION GAP SERPL CALCULATED.3IONS-SCNC: 12 MMOL/L (ref 3–16)
ANTIBODY SCREEN: NORMAL
BASOPHILS ABSOLUTE: 0.1 K/UL (ref 0–0.2)
BASOPHILS RELATIVE PERCENT: 0.9 %
BUN BLDV-MCNC: 49 MG/DL (ref 7–20)
CALCIUM SERPL-MCNC: 9.7 MG/DL (ref 8.3–10.6)
CHLORIDE BLD-SCNC: 104 MMOL/L (ref 99–110)
CO2: 25 MMOL/L (ref 21–32)
CREAT SERPL-MCNC: 1.3 MG/DL (ref 0.6–1.2)
EKG ATRIAL RATE: 89 BPM
EKG DIAGNOSIS: NORMAL
EKG P AXIS: 78 DEGREES
EKG P-R INTERVAL: 134 MS
EKG Q-T INTERVAL: 396 MS
EKG QRS DURATION: 86 MS
EKG QTC CALCULATION (BAZETT): 481 MS
EKG R AXIS: -56 DEGREES
EKG T AXIS: 99 DEGREES
EKG VENTRICULAR RATE: 89 BPM
EOSINOPHILS ABSOLUTE: 1 K/UL (ref 0–0.6)
EOSINOPHILS RELATIVE PERCENT: 10.1 %
GFR AFRICAN AMERICAN: 48
GFR NON-AFRICAN AMERICAN: 40
GLUCOSE BLD-MCNC: 168 MG/DL (ref 70–99)
HCT VFR BLD CALC: 20.6 % (ref 36–48)
HCT VFR BLD CALC: 25.4 % (ref 36–48)
HEMOGLOBIN: 6.8 G/DL (ref 12–16)
HEMOGLOBIN: 8.4 G/DL (ref 12–16)
LYMPHOCYTES ABSOLUTE: 3.3 K/UL (ref 1–5.1)
LYMPHOCYTES RELATIVE PERCENT: 32.3 %
MCH RBC QN AUTO: 30.6 PG (ref 26–34)
MCHC RBC AUTO-ENTMCNC: 33.1 G/DL (ref 31–36)
MCV RBC AUTO: 92.4 FL (ref 80–100)
MONOCYTES ABSOLUTE: 0.7 K/UL (ref 0–1.3)
MONOCYTES RELATIVE PERCENT: 6.7 %
NEUTROPHILS ABSOLUTE: 5.1 K/UL (ref 1.7–7.7)
NEUTROPHILS RELATIVE PERCENT: 50 %
OCCULT BLOOD DIAGNOSTIC: ABNORMAL
PDW BLD-RTO: 14 % (ref 12.4–15.4)
PLATELET # BLD: 279 K/UL (ref 135–450)
PMV BLD AUTO: 8 FL (ref 5–10.5)
POTASSIUM SERPL-SCNC: 5.1 MMOL/L (ref 3.5–5.1)
RBC # BLD: 2.74 M/UL (ref 4–5.2)
SODIUM BLD-SCNC: 141 MMOL/L (ref 136–145)
SPECIMEN STATUS: NORMAL
WBC # BLD: 10.3 K/UL (ref 4–11)

## 2020-02-23 PROCEDURE — 36415 COLL VENOUS BLD VENIPUNCTURE: CPT

## 2020-02-23 PROCEDURE — 2580000003 HC RX 258: Performed by: EMERGENCY MEDICINE

## 2020-02-23 PROCEDURE — P9016 RBC LEUKOCYTES REDUCED: HCPCS

## 2020-02-23 PROCEDURE — 85025 COMPLETE CBC W/AUTO DIFF WBC: CPT

## 2020-02-23 PROCEDURE — 96374 THER/PROPH/DIAG INJ IV PUSH: CPT

## 2020-02-23 PROCEDURE — 93005 ELECTROCARDIOGRAM TRACING: CPT | Performed by: EMERGENCY MEDICINE

## 2020-02-23 PROCEDURE — 6370000000 HC RX 637 (ALT 250 FOR IP): Performed by: INTERNAL MEDICINE

## 2020-02-23 PROCEDURE — 6360000002 HC RX W HCPCS: Performed by: INTERNAL MEDICINE

## 2020-02-23 PROCEDURE — 93010 ELECTROCARDIOGRAM REPORT: CPT | Performed by: INTERNAL MEDICINE

## 2020-02-23 PROCEDURE — 6370000000 HC RX 637 (ALT 250 FOR IP): Performed by: NURSE PRACTITIONER

## 2020-02-23 PROCEDURE — 2700000000 HC OXYGEN THERAPY PER DAY

## 2020-02-23 PROCEDURE — 1200000000 HC SEMI PRIVATE

## 2020-02-23 PROCEDURE — C9113 INJ PANTOPRAZOLE SODIUM, VIA: HCPCS | Performed by: INTERNAL MEDICINE

## 2020-02-23 PROCEDURE — 94640 AIRWAY INHALATION TREATMENT: CPT

## 2020-02-23 PROCEDURE — 6360000002 HC RX W HCPCS: Performed by: EMERGENCY MEDICINE

## 2020-02-23 PROCEDURE — 86900 BLOOD TYPING SEROLOGIC ABO: CPT

## 2020-02-23 PROCEDURE — 94761 N-INVAS EAR/PLS OXIMETRY MLT: CPT

## 2020-02-23 PROCEDURE — 2580000003 HC RX 258: Performed by: INTERNAL MEDICINE

## 2020-02-23 PROCEDURE — 86923 COMPATIBILITY TEST ELECTRIC: CPT

## 2020-02-23 PROCEDURE — 99285 EMERGENCY DEPT VISIT HI MDM: CPT

## 2020-02-23 PROCEDURE — 85018 HEMOGLOBIN: CPT

## 2020-02-23 PROCEDURE — G0328 FECAL BLOOD SCRN IMMUNOASSAY: HCPCS

## 2020-02-23 PROCEDURE — 86901 BLOOD TYPING SEROLOGIC RH(D): CPT

## 2020-02-23 PROCEDURE — 36430 TRANSFUSION BLD/BLD COMPNT: CPT

## 2020-02-23 PROCEDURE — 86850 RBC ANTIBODY SCREEN: CPT

## 2020-02-23 PROCEDURE — 80048 BASIC METABOLIC PNL TOTAL CA: CPT

## 2020-02-23 PROCEDURE — 2580000003 HC RX 258: Performed by: NURSE PRACTITIONER

## 2020-02-23 PROCEDURE — 85014 HEMATOCRIT: CPT

## 2020-02-23 RX ORDER — METHOCARBAMOL 500 MG/1
1000 TABLET, FILM COATED ORAL ONCE
Status: COMPLETED | OUTPATIENT
Start: 2020-02-23 | End: 2020-02-23

## 2020-02-23 RX ORDER — ALBUTEROL SULFATE 90 UG/1
2 AEROSOL, METERED RESPIRATORY (INHALATION) EVERY 6 HOURS PRN
Status: DISCONTINUED | OUTPATIENT
Start: 2020-02-23 | End: 2020-02-27 | Stop reason: HOSPADM

## 2020-02-23 RX ORDER — SODIUM CHLORIDE 9 MG/ML
INJECTION, SOLUTION INTRAVENOUS CONTINUOUS
Status: DISCONTINUED | OUTPATIENT
Start: 2020-02-23 | End: 2020-02-24

## 2020-02-23 RX ORDER — PANTOPRAZOLE SODIUM 40 MG/10ML
40 INJECTION, POWDER, LYOPHILIZED, FOR SOLUTION INTRAVENOUS EVERY 12 HOURS
Status: DISCONTINUED | OUTPATIENT
Start: 2020-02-23 | End: 2020-02-27 | Stop reason: HOSPADM

## 2020-02-23 RX ORDER — 0.9 % SODIUM CHLORIDE 0.9 %
20 INTRAVENOUS SOLUTION INTRAVENOUS ONCE
Status: COMPLETED | OUTPATIENT
Start: 2020-02-23 | End: 2020-02-24

## 2020-02-23 RX ORDER — SODIUM CHLORIDE 0.9 % (FLUSH) 0.9 %
10 SYRINGE (ML) INJECTION PRN
Status: DISCONTINUED | OUTPATIENT
Start: 2020-02-23 | End: 2020-02-27 | Stop reason: HOSPADM

## 2020-02-23 RX ORDER — ONDANSETRON 2 MG/ML
4 INJECTION INTRAMUSCULAR; INTRAVENOUS ONCE
Status: COMPLETED | OUTPATIENT
Start: 2020-02-23 | End: 2020-02-23

## 2020-02-23 RX ORDER — ACETAMINOPHEN 325 MG/1
650 TABLET ORAL EVERY 4 HOURS PRN
Status: DISCONTINUED | OUTPATIENT
Start: 2020-02-23 | End: 2020-02-27 | Stop reason: HOSPADM

## 2020-02-23 RX ORDER — ATORVASTATIN CALCIUM 10 MG/1
20 TABLET, FILM COATED ORAL NIGHTLY
Status: DISCONTINUED | OUTPATIENT
Start: 2020-02-23 | End: 2020-02-27 | Stop reason: HOSPADM

## 2020-02-23 RX ORDER — PROMETHAZINE HYDROCHLORIDE 25 MG/1
12.5 TABLET ORAL EVERY 6 HOURS PRN
Status: DISCONTINUED | OUTPATIENT
Start: 2020-02-23 | End: 2020-02-27 | Stop reason: HOSPADM

## 2020-02-23 RX ORDER — ONDANSETRON 2 MG/ML
4 INJECTION INTRAMUSCULAR; INTRAVENOUS EVERY 6 HOURS PRN
Status: DISCONTINUED | OUTPATIENT
Start: 2020-02-23 | End: 2020-02-27 | Stop reason: HOSPADM

## 2020-02-23 RX ORDER — CYCLOBENZAPRINE HCL 10 MG
10 TABLET ORAL 3 TIMES DAILY PRN
Status: DISCONTINUED | OUTPATIENT
Start: 2020-02-23 | End: 2020-02-27 | Stop reason: HOSPADM

## 2020-02-23 RX ORDER — SODIUM CHLORIDE 9 MG/ML
10 INJECTION INTRAVENOUS EVERY 12 HOURS
Status: DISCONTINUED | OUTPATIENT
Start: 2020-02-23 | End: 2020-02-27 | Stop reason: HOSPADM

## 2020-02-23 RX ORDER — BUDESONIDE 0.5 MG/2ML
0.5 INHALANT ORAL 2 TIMES DAILY
Status: DISCONTINUED | OUTPATIENT
Start: 2020-02-23 | End: 2020-02-27 | Stop reason: HOSPADM

## 2020-02-23 RX ORDER — SODIUM CHLORIDE 0.9 % (FLUSH) 0.9 %
10 SYRINGE (ML) INJECTION EVERY 12 HOURS SCHEDULED
Status: DISCONTINUED | OUTPATIENT
Start: 2020-02-23 | End: 2020-02-27 | Stop reason: HOSPADM

## 2020-02-23 RX ORDER — 0.9 % SODIUM CHLORIDE 0.9 %
500 INTRAVENOUS SOLUTION INTRAVENOUS ONCE
Status: COMPLETED | OUTPATIENT
Start: 2020-02-23 | End: 2020-02-23

## 2020-02-23 RX ADMIN — Medication 10 ML: at 12:03

## 2020-02-23 RX ADMIN — BUDESONIDE 500 MCG: 0.5 SUSPENSION RESPIRATORY (INHALATION) at 11:29

## 2020-02-23 RX ADMIN — BUDESONIDE 500 MCG: 0.5 SUSPENSION RESPIRATORY (INHALATION) at 20:39

## 2020-02-23 RX ADMIN — POLYETHYLENE GLYCOL 3350, SODIUM SULFATE ANHYDROUS, SODIUM BICARBONATE, SODIUM CHLORIDE, POTASSIUM CHLORIDE 2000 ML: 236; 22.74; 6.74; 5.86; 2.97 POWDER, FOR SOLUTION ORAL at 16:42

## 2020-02-23 RX ADMIN — PANTOPRAZOLE SODIUM 40 MG: 40 INJECTION, POWDER, LYOPHILIZED, FOR SOLUTION INTRAVENOUS at 12:03

## 2020-02-23 RX ADMIN — SODIUM CHLORIDE 20 ML: 900 INJECTION, SOLUTION INTRAVENOUS at 21:30

## 2020-02-23 RX ADMIN — SODIUM CHLORIDE: 9 INJECTION, SOLUTION INTRAVENOUS at 12:03

## 2020-02-23 RX ADMIN — TIOTROPIUM BROMIDE INHALATION SPRAY 2 PUFF: 3.12 SPRAY, METERED RESPIRATORY (INHALATION) at 11:29

## 2020-02-23 RX ADMIN — METHOCARBAMOL TABLETS 1000 MG: 500 TABLET, COATED ORAL at 22:23

## 2020-02-23 RX ADMIN — CYCLOBENZAPRINE HYDROCHLORIDE 10 MG: 10 TABLET, FILM COATED ORAL at 12:28

## 2020-02-23 RX ADMIN — SODIUM CHLORIDE 500 ML: 9 INJECTION, SOLUTION INTRAVENOUS at 08:03

## 2020-02-23 RX ADMIN — ATORVASTATIN CALCIUM 20 MG: 10 TABLET, FILM COATED ORAL at 22:23

## 2020-02-23 RX ADMIN — ONDANSETRON 4 MG: 2 INJECTION INTRAMUSCULAR; INTRAVENOUS at 08:00

## 2020-02-23 RX ADMIN — Medication 10 ML: at 12:28

## 2020-02-23 ASSESSMENT — PAIN SCALES - GENERAL: PAINLEVEL_OUTOF10: 4

## 2020-02-23 ASSESSMENT — ENCOUNTER SYMPTOMS
RHINORRHEA: 0
SORE THROAT: 0
TROUBLE SWALLOWING: 0
SHORTNESS OF BREATH: 0
DIARRHEA: 0
CHEST TIGHTNESS: 0
COUGH: 0
WHEEZING: 0
VOMITING: 0
BLOOD IN STOOL: 1
ABDOMINAL PAIN: 0
NAUSEA: 1
STRIDOR: 0

## 2020-02-23 ASSESSMENT — PULMONARY FUNCTION TESTS: PEFR_L/MIN: 18

## 2020-02-23 NOTE — H&P
Hospital Medicine History & Physical      PCP: Lelo Carl MD    Date of Admission: 2/23/2020    Date of Service: Pt seen/examined on 02/23/20 and Admitted to Inpatient with expected LOS greater than two midnights due to medical therapy. Chief Complaint:  GI bleed      History Of Present Illness:    67 y.o. female who presented to [facility] with GI bleed. Patient has had diarrhea for the past couple days. This morning she noted bright red blood in the toilet after her BM. She is unsure is she had any bleeding with the prior episodes of diarrhea. Patient states she had a prior GI bleed years ago related to hemorrhoids. She has never had a colonoscopy. She denies abdominal pain. She has had some nausea since the diarrhea began. Past Medical History:          Diagnosis Date    NADJA (acute kidney injury) [facility]     Asthma     COPD (chronic obstructive pulmonary disease) [facility]     Hyperlipidemia     Hypertension     MRSA (methicillin resistant staph aureus) culture positive 09/22/2019    + resp cx    OA (osteoarthritis) 8/12/2014       Past Surgical History:          Procedure Laterality Date    BRONCHOSCOPY  09/08/2019    Dr. Rasta Benoit - w/BAL    CARDIAC CATHETERIZATION  09/05/2019    Dr. Mariajose Infante N/A 9/19/2019    OFF PUMP CORONARY ARTERY BYPASS GRAFTING X2, INTERNAL MAMMARY ARTERY, SAPHENOUS VEIN GRAFT performed by Anitha Baeza MD at [address], PARTIAL Right        Medications Prior to Admission:      Prior to Admission medications    Medication Sig Start Date End Date Taking? Authorizing Provider   acetaminophen (TYLENOL) 500 MG tablet Take 500 mg by mouth every 6 hours as needed for Pain   Yes Historical Provider, MD   sertraline (ZOLOFT) 50 MG tablet Take 50 mg by mouth daily   Yes Historical Provider, MD   ALPRAZolam (XANAX) 0.25 MG tablet Take 0.25 mg by mouth 3 times daily as needed for Sleep.    Yes Historical TROPONINI in the last 72 hours. Urinalysis:      Lab Results   Component Value Date    NITRU POSITIVE 09/18/2019    WBCUA 10-20 09/18/2019    BACTERIA 1+ 09/18/2019    RBCUA 3-5 09/18/2019    BLOODU Negative 09/18/2019    SPECGRAV 1.015 09/18/2019    GLUCOSEU Negative 09/18/2019       Radiology:     CXR: I have reviewed the CXR with the following interpretation: none  EKG:  I have reviewed the EKG with the following interpretation: NSR    No orders to display       ASSESSMENT:    Active Hospital Problems    Diagnosis Date Noted    GI bleed [K92.2] 02/23/2020         PLAN:  Acute blood loss anemia 2/2 suspected lower GI bleed  - Possible hemorrhoid bleeding  - holding home ASA, plavix  - has chronic anemia but is acutely worsened  - no transfusion needed at this time  - continue to trend H/H  - GI consulted  - plan for colonoscopy tomorrow    CAD s/p CABG  - no active chest pain  - hold ASA, plavix for now. Will need to clarify timing of when to resume    CKD stage III  - Cr at baseline  - started on IVF  - continue to monitor    HTN  - well controlled  - holding home BP meds for now    HLD  - continue home statin    COPD  - no evidence of exacerbation  - continue home inhalers    DVT Prophylaxis: SCDs  Diet: DIET CLEAR LIQUID;  Diet NPO, After Midnight Exceptions are: Ice Chips  Code Status: Full Code    PT/OT Eval Status: not ordered    Dispo - at least two days       Christiana Hilario MD    Thank you Bekah Alexander MD for the opportunity to be involved in this patient's care. If you have any questions or concerns please feel free to contact me at [phone].

## 2020-02-23 NOTE — PROGRESS NOTES
Patient transferred from ED , A&O, call light within reach, bed in lowest position and locked. Skin assessment completed.   VS:    Holley Grider

## 2020-02-23 NOTE — ED PROVIDER NOTES
[facility]  ED  EMERGENCYDEPARTMENT ENCOUNTER      Pt Name: Corina Bhardwaj  MRN: [id number]  [place] 1940  Date of evaluation: 2/23/2020  Jorge Scales MD    [address] [place] [address]       Chief Complaint   Patient presents with    Rectal Bleeding     Pt states she has hemmorhoids and had gabriela blood in stool this am Pt denies pain states nausea. Pt states \"I feel like Im going to pass out\"         HISTORY OF PRESENT ILLNESS   (Location/Symptom, Timing/Onset,Context/Setting, Quality, Duration, Modifying Factors, Severity)  Note limiting factors. Corina Bhardwaj is a 67 y.o. female who presents to the emergency department for rectal bleed. Patient reports she was having a bowel movement earlier this morning when she noticed bright red blood per rectum. Also reports diarrhea for the past several days, states that she has been nauseous, lightheaded and dizzy. Denies rectal pain, or pain on defecation, fever, vomiting, cp, sob. HPI    Nursing Notes were reviewed. REVIEW OF SYSTEMS    (2-9 systems for level 4, 10 or more for level 5)     Review of Systems   Constitutional: Negative for activity change, appetite change, diaphoresis and fever. HENT: Negative for congestion, rhinorrhea, sore throat and trouble swallowing. Respiratory: Negative for cough, chest tightness, shortness of breath, wheezing and stridor. Cardiovascular: Negative for chest pain and palpitations. Gastrointestinal: Positive for blood in stool and nausea. Negative for abdominal pain, diarrhea and vomiting. Genitourinary: Negative for dysuria and flank pain. Skin: Negative for rash and wound. Neurological: Positive for dizziness and light-headedness. Negative for weakness, numbness and headaches. Except as noted above the remainder of the review of systems was reviewedand negative.        PAST MEDICAL HISTORY     Past Medical History:   Diagnosis Date    NADJA (acute kidney injury) ([facility] [address]     94 lb (42.6 kg)       Physical Exam  Constitutional:       General: She is not in acute distress. Appearance: Normal appearance. She is well-developed. She is not ill-appearing or toxic-appearing. Comments: Sitting in bed comfortably, speaking in full sentences, following verbal commands appropriately. Not in acute distress     HENT:      Head: Normocephalic and atraumatic. Eyes:      Conjunctiva/sclera: Conjunctivae normal.      Pupils: Pupils are equal, round, and reactive to light. Neck:      Musculoskeletal: Normal range of motion and neck supple. Cardiovascular:      Rate and Rhythm: Normal rate and regular rhythm. Heart sounds: Normal heart sounds. No murmur. No friction rub. No gallop. Pulmonary:      Effort: Pulmonary effort is normal. No respiratory distress. Breath sounds: Normal breath sounds. No decreased breath sounds, wheezing, rhonchi or rales. Abdominal:      General: Bowel sounds are normal. There is no distension. Palpations: Abdomen is soft. Tenderness: There is no abdominal tenderness. There is no guarding or rebound. Genitourinary:     Comments: Rectal tone intact. No fissures, external hemorrhoids. No internal hemorrhoids palpated. +gabriela blood   Musculoskeletal: Normal range of motion. General: No tenderness or deformity. Skin:     General: Skin is warm and dry. Findings: No rash. Neurological:      Mental Status: She is alert and oriented to person, place, and time. GCS: GCS eye subscore is 4. GCS verbal subscore is 5. GCS motor subscore is 6. Psychiatric:         Behavior: Behavior is cooperative. DIAGNOSTIC RESULTS     EKG: All EKG's are interpreted by the Emergency Department Physicianwho either signs or Co-signs this chart in the absence of a cardiologist.    The Ekg interpreted by me shows  normal sinus rhythm with a rate of 89  Axis is   Left axis deviation  QTc is  481  Intervals and Durations are unremarkable. ST Segments: no acute change  No significant change from prior EKG dated 9/13/2019    RADIOLOGY:   Non-plain film images such as CT, Ultrasound and MRI are read by the radiologist. Plain radiographic images are visualized and preliminarily interpreted by the emergency physician with the below findings:      Interpretation per the Radiologist below, if available at the time of this note:    No orders to display         ED BEDSIDE ULTRASOUND:   Performed by ED Physician - none    LABS:  Labs Reviewed   CBC WITH AUTO DIFFERENTIAL - Abnormal; Notable for the following components:       Result Value    RBC 2.74 (*)     Hemoglobin 8.4 (*)     Hematocrit 25.4 (*)     Eosinophils Absolute 1.0 (*)     All other components within normal limits    Narrative:     Performed at:  [place] [address] [place]   Phone [phone]   BASIC METABOLIC PANEL - Abnormal; Notable for the following components:    Glucose 168 (*)     BUN 49 (*)     CREATININE 1.3 (*)     GFR Non- 40 (*)     GFR  48 (*)     All other components within normal limits    Narrative:     Performed at:  [place] [address] [address]   Phone [phone]   BLOOD OCCULT STOOL DIAGNOSTIC - Abnormal; Notable for the following components:    Occult Blood Diagnostic   (*)     Value: Result: POSITIVE  Normal range: Negative      All other components within normal limits    Narrative:     ORDER#: [id number]                          ORDERED BY: Cheli Sloan  SOURCE: Stool                              COLLECTED:  02/23/20 07:55  ANTIBIOTICS AT [facility]URI[facility].:                      RECEIVED :  02/23/20 07:58  Performed at:  [address], [facility] [place]   Phone [phone] POSSIBLE BLOOD BANK TESTING    Narrative:     Performed at:  [address] [place] [address] Laboratory  [address] [place]   Phone [phone]       All other labs were within normal range ornot returned as of this dictation. EMERGENCY DEPARTMENT COURSE and DIFFERENTIAL DIAGNOSIS/MDM:   Vitals:    Vitals:    02/23/20 0723   BP: 118/61   Pulse: 105   Resp: 24   Temp: 97.4 °F (36.3 °C)   TempSrc: Oral   SpO2: 100%   Weight: 94 lb (42.6 kg)         MDM    ED COURSE/MDM    -Corina Bhardwaj is a 67 y.o. female with a history of CAD, COPD, hyperlipidemia presents to ED for rectal bleed. Patient states she had a large bloody bowel movement this morning. Has been experiencing diarrhea for the past several days. Endorses nausea, lightheadedness, dizziness.  -Family patient was told that she had low blood level and has been on iron for the past 3 months however was last told that it had remained stable, does not know what level she her hemoglobin was. -currently on plavix  -On arrival tachycardic at 115 respiratory rate of 24, afebrile  -Lab work-up was significant for hemoglobin of 8.4 hematocrit of 25.4 is in comparison to 11/14/2019 when patient's hemoglobin was 11.9 hematocrit of 36.2. BUN of 49, creatinine of 1.3 with decreased GFR of 40.  -Rectal exam, gabriela red blood with Hemoccult positive. -Given IV fluid bolus and Zofran for nausea.  -Labs and imaging reviewed and results discussed with patient. Discuss possible admission for further work-up of GI bleed with patient and family who agrees with plan. Has no further questions or concerns at this time. GI was consulted, spoke with Dr. Lyla Allen to alert them of admission to hospitalist for rectal bleed. Dr. Louis Palmer, hospitalist agrees to admission. REASSESSMENT      Well appearing, non toxic, alert, oriented speaking in full sentences and hemodynamically stable upon admission      CRITICAL CARE TIME   Total Critical Care time was 0 minutes, excluding separately reportableprocedures.   There was a high probability of

## 2020-02-23 NOTE — PROGRESS NOTES
RESPIRATORY THERAPY ASSESSMENT    Name:  Federico Nazario  Medical Record Number:  [id number]  Age: 67 y.o. Gender: female  : 1940  Today's Date:  2020  Room:  7465/1295-09    Assessment     Is the patient being admitted for a COPD or Asthma exacerbation? No   (If yes the patient will be seen every 4 hours for the first 24 hours and then reassessed)    Patient Admission Diagnosis      Allergies  Allergies   Allergen Reactions    Codeine      \"hallucinations\"       Minimum Predicted Vital Capacity:     NA          Actual Vital Capacity:      NA              Pulmonary History:COPD  Home Oxygen Therapy:  2 liters/min via nasal cannula  Home Respiratory Therapy:Albuterol, Albuterol/Ipratropium Bromide HHN, Budesonide, Budesonide/Formoterol  and Tiotropium Bromide   Current Respiratory Therapy:  Spiriva Respimat daily and Pulmicort BID  Treatment Type: MDI  Medications: Tiotropium    Respiratory Severity Index(RSI)   Patients with orders for inhalation medications, oxygen, or any therapeutic treatment modality will be placed on Respiratory Protocol. They will be assessed with the first treatment and at least every 72 hours thereafter. The following severity scale will be used to determine frequency of treatment intervention.     Smoking History: Pulmonary Disease or Smoking History, Greater than 15 pack year = 2    Social History  Social History     Tobacco Use    Smoking status: Former Smoker     Packs/day: 0.50     Years: 50.00     Pack years: 25.00     Types: Cigarettes     Last attempt to quit: 2019     Years since quittin.4    Smokeless tobacco: Never Used    Tobacco comment: advised to quit   Substance Use Topics    Alcohol use: No    Drug use: No       Recent Surgical History: None = 0  Past Surgical History  Past Surgical History:   Procedure Laterality Date    BRONCHOSCOPY  2019    Dr. El Del Castillo - w/ALIYA   [address]  2019    Dr. Kt Red SECTION      CORONARY ARTERY BYPASS GRAFT N/A 9/19/2019    OFF PUMP CORONARY ARTERY BYPASS GRAFTING X2, INTERNAL MAMMARY ARTERY, SAPHENOUS VEIN GRAFT performed by Donna Azul MD at [address], PARTIAL Right        Level of Consciousness: Alert, Oriented, and Cooperative = 0    Level of Activity: Mostly sedentary, minimal walking = 2    Respiratory Pattern: Regular Pattern; RR 8-20 = 0    Breath Sounds: Diminshed bilaterally and/or crackles = 2    Sputum   ,  ,    Cough: Strong, spontaneous, non-productive = 0    Vital Signs   /61   Pulse 105   Temp 97.4 °F (36.3 °C) (Oral)   Resp 24   Ht 5' 3\" (1.6 m)   Wt 90 lb (40.8 kg)   SpO2 100%   BMI 15.94 kg/m²   SPO2 (COPD values may differ): 90-91% on room air or greater than 92% on FiO2 24- 28% = 1    Peak Flow (asthma only): not applicable = 0    RSI: 7-8 = BID and Q4HPRN (every four hours as needed) for dyspnea        Plan       Goals: medication delivery and improve oxygenation    Patient/caregiver was educated on the proper method of use for Respiratory Care Devices:  Yes      Level of patient/caregiver understanding able to:   ? Verbalize understanding   ? Demonstrate understanding       ? Teach back        ? Needs reinforcement       ? No available caregiver               ? Other:     Response to education:  Good     Is patient being placed on Home Treatment Regimen? Yes     Does the patient have everything they need prior to discharge? NA     Comments: chart reviewed and pt assessed    Plan of Care: home regimen    Electronically signed by Sharad Garcia RCP on 2/23/2020 at 12:01 PM    Respiratory Protocol Guidelines     1. Assessment and treatment by Respiratory Therapy will be initiated for medication and therapeutic interventions upon initiation of aerosolized medication. 2. Physician will be contacted for respiratory rate (RR) greater than 35 breaths per minute.  Therapy will be held for heart rate (HR) greater than 140 beats per minute, pending direction from physician. 3. Bronchodilators will be administered via Metered Dose Inhaler (MDI) with spacer when the following criteria are met:  a. Alert and cooperative     b. HR < 140 bpm  c. RR < 30 bpm                d. Can demonstrate a 2-3 second inspiratory hold  4. Bronchodilators will be administered via Hand Held Nebulizer [facility]) to patients when ANY of the following criteria are met  a. Incognizant or uncooperative          b. Patients treated with HHN at Home        c. Unable to demonstrate proper use of MDI with spacer     d. RR > 30 bpm   5. Bronchodilators will be delivered via Metered Dose Inhaler (MDI), HHN, Aerogen to intubated patients on mechanical ventilation. 6. Inhalation medication orders will be delivered and/or substituted as outlined below. Aerosolized Medications Ordering and Administration Guidelines:    1. All Medications will be ordered by a physician, and their frequency and/or modality will be adjusted as defined by the patients Respiratory Severity Index (RSI) score. 2. If the patient does not have documented COPD, consider discontinuing anticholinergics when RSI is less than 9.  3. If the bronchospasm worsens (increased RSI), then the bronchodilator frequency can be increased to a maximum of every 4 hours. If greater than every 4 hours is required, the physician will be contacted. 4. If the bronchospasm improves, the frequency of the bronchodilator can be decreased, based on the patient's RSI, but not less than home treatment regimen frequency. 5. Bronchodilator(s) will be discontinued if patient has a RSI less than 9 and has received no scheduled or as needed treatment for 72  Hrs. Patients Ordered on a Mucolytic Agent:    1. Must always be administered with a bronchodilator.     2. Discontinue if patient experiences worsened bronchospasm, or secretions have lessened to the point that the patient is able to clear them with a

## 2020-02-23 NOTE — CONSULTS
Gastroenterology Consult Note    Patient:   Wilfredo Sever   YOB: 1940   Facility:   [facility]   Referring/PCP: Tj Chávez MD  Date:     2/23/2020  Consultant:   Aidan Ngo MD    Subjective: This 67 y.o. female was admitted 2/23/2020 with a diagnosis of \"GI bleed [K92.2]  GI bleed [K92.2]\" and is seen in consultation regarding \"hematochezia\". Information was obtained from interview of  the patient, examination of the patient, and review of records. I did  update the past medical, surgical, social and / or family history. Hematochezia moderate in colon for 1 day assoc w anemia and diarrhea    Current status  Present  Diet Order: Diet NPO Effective Now Exceptions are: Ice Chips and she is tolerating diet. Recently, she has experienced no abdominal  Pain and she has not required Intravenous narcotic analgesics. The patient has also experienced no constipation, fever, melena, nausea and vomiting      Prior to Admission medications    Medication Sig Start Date End Date Taking? Authorizing Provider   acetaminophen (TYLENOL) 500 MG tablet Take 500 mg by mouth every 6 hours as needed for Pain   Yes Historical Provider, MD   sertraline (ZOLOFT) 50 MG tablet Take 50 mg by mouth daily   Yes Historical Provider, MD   ALPRAZolam (XANAX) 0.25 MG tablet Take 0.25 mg by mouth 3 times daily as needed for Sleep.    Yes Historical Provider, MD   budesonide (PULMICORT) 0.5 MG/2ML nebulizer suspension Take 2 mLs by nebulization 2 times daily 9/24/19  Yes China Boyd MD   ipratropium-albuterol (DUONEB) 0.5-2.5 (3) MG/3ML SOLN nebulizer solution Inhale 3 mLs into the lungs every 6 hours 9/24/19  Yes China Boyd MD   atorvastatin (LIPITOR) 20 MG tablet Take 1 tablet by mouth nightly 9/24/19  Yes China Boyd MD   metoprolol tartrate (LOPRESSOR) 25 MG tablet Take 1 tablet by mouth 2 times daily 9/24/19  Yes China Boyd MD   famotidine (PEPCID) 20 MG tablet Problem List    Diagnosis Date Noted    GI bleed 02/23/2020    Moderate malnutrition ([address] 09/25/2019    S/P CABG (coronary artery bypass graft) 09/25/2019    Pneumonia of both lower lobes due to methicillin resistant Staphylococcus aureus (MRSA) ([address] 09/25/2019    CAD in native artery 09/24/2019    Mucus plugging of bronchi     Status post aorto-coronary artery bypass graft     NADJA (acute kidney injury) ([address]     Hypernatremia 09/14/2019    Ischemic cardiomyopathy     Acute combined systolic and diastolic congestive heart failure (HCC)     Acute respiratory failure with hypoxia ([address] 09/08/2019    CAD (coronary artery disease) 09/08/2019    Acute pulmonary edema ([address] 09/08/2019    Pulmonary infiltrate 09/08/2019    Elevated brain natriuretic peptide (BNP) level 09/08/2019    Leukocytosis 09/08/2019    NSTEMI (non-ST elevated myocardial infarction) ([address] 09/03/2019    Pneumonia due to organism 03/19/2019    Abnormal chest x-ray     COPD with acute exacerbation (HCC)     Shortness of breath     Tobacco abuse     Sepsis ([address] 03/18/2019    COPD exacerbation ([facility] [address] 12/29/2017    OA (osteoarthritis) 08/12/2014    Tobacco use 08/12/2014    Hyperlipidemia 05/07/2013    Asthma 05/07/2013     80 yo w CAD s/p NSTEMI in 9/2019 and s/p CABG, HTN and COPD, admitted w 1 day of hematochezia preceded by 1 day of diarrhea. H/H 8/25. She has never had a colonoscopy. Plan:   1. Clear liquid diet  2. F/U H/H  3. Will prep for Colonoscopy in am  4.  Will follow    Anibal Clay MD       ([phone]) [phone]

## 2020-02-24 LAB
ANION GAP SERPL CALCULATED.3IONS-SCNC: 10 MMOL/L (ref 3–16)
BUN BLDV-MCNC: 33 MG/DL (ref 7–20)
CALCIUM SERPL-MCNC: 8.5 MG/DL (ref 8.3–10.6)
CHLORIDE BLD-SCNC: 104 MMOL/L (ref 99–110)
CO2: 26 MMOL/L (ref 21–32)
CREAT SERPL-MCNC: 1.1 MG/DL (ref 0.6–1.2)
GFR AFRICAN AMERICAN: 58
GFR NON-AFRICAN AMERICAN: 48
GLUCOSE BLD-MCNC: 99 MG/DL (ref 70–99)
HCT VFR BLD CALC: 23 % (ref 36–48)
HCT VFR BLD CALC: 24 % (ref 36–48)
HCT VFR BLD CALC: 24.3 % (ref 36–48)
HEMOGLOBIN: 7.8 G/DL (ref 12–16)
HEMOGLOBIN: 8.2 G/DL (ref 12–16)
HEMOGLOBIN: 8.6 G/DL (ref 12–16)
MAGNESIUM: 1.8 MG/DL (ref 1.8–2.4)
MCH RBC QN AUTO: 32.1 PG (ref 26–34)
MCH RBC QN AUTO: 36.4 PG (ref 26–34)
MCHC RBC AUTO-ENTMCNC: 33.8 G/DL (ref 31–36)
MCHC RBC AUTO-ENTMCNC: 35.2 G/DL (ref 31–36)
MCV RBC AUTO: 103.2 FL (ref 80–100)
MCV RBC AUTO: 95 FL (ref 80–100)
PDW BLD-RTO: 14.3 % (ref 12.4–15.4)
PDW BLD-RTO: 14.6 % (ref 12.4–15.4)
PLATELET # BLD: 165 K/UL (ref 135–450)
PLATELET # BLD: 172 K/UL (ref 135–450)
PMV BLD AUTO: 7.7 FL (ref 5–10.5)
PMV BLD AUTO: 7.8 FL (ref 5–10.5)
POTASSIUM REFLEX MAGNESIUM: 3.5 MMOL/L (ref 3.5–5.1)
RBC # BLD: 2.36 M/UL (ref 4–5.2)
RBC # BLD: 2.42 M/UL (ref 4–5.2)
SODIUM BLD-SCNC: 140 MMOL/L (ref 136–145)
WBC # BLD: 6 K/UL (ref 4–11)
WBC # BLD: 7 K/UL (ref 4–11)

## 2020-02-24 PROCEDURE — 1200000000 HC SEMI PRIVATE

## 2020-02-24 PROCEDURE — 6370000000 HC RX 637 (ALT 250 FOR IP): Performed by: INTERNAL MEDICINE

## 2020-02-24 PROCEDURE — 36415 COLL VENOUS BLD VENIPUNCTURE: CPT

## 2020-02-24 PROCEDURE — 6360000002 HC RX W HCPCS: Performed by: INTERNAL MEDICINE

## 2020-02-24 PROCEDURE — 99152 MOD SED SAME PHYS/QHP 5/>YRS: CPT | Performed by: INTERNAL MEDICINE

## 2020-02-24 PROCEDURE — 0DJD8ZZ INSPECTION OF LOWER INTESTINAL TRACT, VIA NATURAL OR ARTIFICIAL OPENING ENDOSCOPIC: ICD-10-PCS | Performed by: INTERNAL MEDICINE

## 2020-02-24 PROCEDURE — 7100000011 HC PHASE II RECOVERY - ADDTL 15 MIN: Performed by: INTERNAL MEDICINE

## 2020-02-24 PROCEDURE — 2709999900 HC NON-CHARGEABLE SUPPLY: Performed by: INTERNAL MEDICINE

## 2020-02-24 PROCEDURE — 94640 AIRWAY INHALATION TREATMENT: CPT

## 2020-02-24 PROCEDURE — 2700000000 HC OXYGEN THERAPY PER DAY

## 2020-02-24 PROCEDURE — 80048 BASIC METABOLIC PNL TOTAL CA: CPT

## 2020-02-24 PROCEDURE — 7100000010 HC PHASE II RECOVERY - FIRST 15 MIN: Performed by: INTERNAL MEDICINE

## 2020-02-24 PROCEDURE — 99153 MOD SED SAME PHYS/QHP EA: CPT | Performed by: INTERNAL MEDICINE

## 2020-02-24 PROCEDURE — 94761 N-INVAS EAR/PLS OXIMETRY MLT: CPT

## 2020-02-24 PROCEDURE — 2580000003 HC RX 258: Performed by: INTERNAL MEDICINE

## 2020-02-24 PROCEDURE — 83735 ASSAY OF MAGNESIUM: CPT

## 2020-02-24 PROCEDURE — 3609027000 HC COLONOSCOPY: Performed by: INTERNAL MEDICINE

## 2020-02-24 PROCEDURE — 85018 HEMOGLOBIN: CPT

## 2020-02-24 PROCEDURE — C9113 INJ PANTOPRAZOLE SODIUM, VIA: HCPCS | Performed by: INTERNAL MEDICINE

## 2020-02-24 PROCEDURE — 85027 COMPLETE CBC AUTOMATED: CPT

## 2020-02-24 PROCEDURE — 85014 HEMATOCRIT: CPT

## 2020-02-24 RX ORDER — FENTANYL CITRATE 50 UG/ML
INJECTION, SOLUTION INTRAMUSCULAR; INTRAVENOUS PRN
Status: DISCONTINUED | OUTPATIENT
Start: 2020-02-24 | End: 2020-02-24 | Stop reason: ALTCHOICE

## 2020-02-24 RX ORDER — MIDAZOLAM HYDROCHLORIDE 5 MG/ML
INJECTION INTRAMUSCULAR; INTRAVENOUS PRN
Status: DISCONTINUED | OUTPATIENT
Start: 2020-02-24 | End: 2020-02-24 | Stop reason: ALTCHOICE

## 2020-02-24 RX ORDER — FLUTICASONE PROPIONATE 50 MCG
1 SPRAY, SUSPENSION (ML) NASAL DAILY
Status: DISCONTINUED | OUTPATIENT
Start: 2020-02-24 | End: 2020-02-27 | Stop reason: HOSPADM

## 2020-02-24 RX ADMIN — TIOTROPIUM BROMIDE INHALATION SPRAY 2 PUFF: 3.12 SPRAY, METERED RESPIRATORY (INHALATION) at 09:01

## 2020-02-24 RX ADMIN — Medication 10 ML: at 01:57

## 2020-02-24 RX ADMIN — PANTOPRAZOLE SODIUM 40 MG: 40 INJECTION, POWDER, LYOPHILIZED, FOR SOLUTION INTRAVENOUS at 10:14

## 2020-02-24 RX ADMIN — BUDESONIDE 500 MCG: 0.5 SUSPENSION RESPIRATORY (INHALATION) at 09:01

## 2020-02-24 RX ADMIN — Medication 10 ML: at 10:14

## 2020-02-24 RX ADMIN — PANTOPRAZOLE SODIUM 40 MG: 40 INJECTION, POWDER, LYOPHILIZED, FOR SOLUTION INTRAVENOUS at 21:58

## 2020-02-24 RX ADMIN — PANTOPRAZOLE SODIUM 40 MG: 40 INJECTION, POWDER, LYOPHILIZED, FOR SOLUTION INTRAVENOUS at 01:57

## 2020-02-24 RX ADMIN — ATORVASTATIN CALCIUM 20 MG: 10 TABLET, FILM COATED ORAL at 21:58

## 2020-02-24 RX ADMIN — SERTRALINE HYDROCHLORIDE 50 MG: 50 TABLET ORAL at 10:15

## 2020-02-24 RX ADMIN — CYCLOBENZAPRINE HYDROCHLORIDE 10 MG: 10 TABLET, FILM COATED ORAL at 20:13

## 2020-02-24 RX ADMIN — FLUTICASONE PROPIONATE 1 SPRAY: 50 SPRAY, METERED NASAL at 18:54

## 2020-02-24 RX ADMIN — BUDESONIDE 500 MCG: 0.5 SUSPENSION RESPIRATORY (INHALATION) at 19:24

## 2020-02-24 RX ADMIN — Medication 10 ML: at 21:58

## 2020-02-24 RX ADMIN — Medication 10 ML: at 21:59

## 2020-02-24 ASSESSMENT — PAIN SCALES - GENERAL
PAINLEVEL_OUTOF10: 6
PAINLEVEL_OUTOF10: 0

## 2020-02-24 ASSESSMENT — PAIN DESCRIPTION - LOCATION: LOCATION: LEG

## 2020-02-24 NOTE — PROGRESS NOTES
Cardiologist for the last 2 months.      Chronic kidney disease stage 3:   - Cr at baseline. Monitor.      Hypertension:  - Well controlled. - Holding home BP meds for now in setting of GI bleed.      Hyperlipidemia:  - Continue home statin.     COPD:  - No evidence of exacerbation.  - Continue home inhalers.       DVT Prophylaxis: SCDs  Diet: DIET GENERAL;  Code Status: Full Code    PT/OT Eval Status: Not indicated    Dispo - 1-2 days     Flor Flanagan, APRN - CNP

## 2020-02-25 PROBLEM — E43 SEVERE MALNUTRITION (HCC): Chronic | Status: ACTIVE | Noted: 2020-02-25

## 2020-02-25 LAB
ANION GAP SERPL CALCULATED.3IONS-SCNC: 9 MMOL/L (ref 3–16)
BUN BLDV-MCNC: 16 MG/DL (ref 7–20)
CALCIUM SERPL-MCNC: 8.2 MG/DL (ref 8.3–10.6)
CHLORIDE BLD-SCNC: 105 MMOL/L (ref 99–110)
CO2: 26 MMOL/L (ref 21–32)
CREAT SERPL-MCNC: 1.1 MG/DL (ref 0.6–1.2)
GFR AFRICAN AMERICAN: 58
GFR NON-AFRICAN AMERICAN: 48
GLUCOSE BLD-MCNC: 109 MG/DL (ref 70–99)
HCT VFR BLD CALC: 20.5 % (ref 36–48)
HCT VFR BLD CALC: 21.2 % (ref 36–48)
HCT VFR BLD CALC: 21.5 % (ref 36–48)
HCT VFR BLD CALC: 25.4 % (ref 36–48)
HEMOGLOBIN: 7.3 G/DL (ref 12–16)
HEMOGLOBIN: 7.3 G/DL (ref 12–16)
HEMOGLOBIN: 7.4 G/DL (ref 12–16)
HEMOGLOBIN: 8.5 G/DL (ref 12–16)
MCH RBC QN AUTO: 35 PG (ref 26–34)
MCHC RBC AUTO-ENTMCNC: 34.1 G/DL (ref 31–36)
MCV RBC AUTO: 102.7 FL (ref 80–100)
PDW BLD-RTO: 14 % (ref 12.4–15.4)
PLATELET # BLD: 152 K/UL (ref 135–450)
PMV BLD AUTO: 7.8 FL (ref 5–10.5)
POTASSIUM REFLEX MAGNESIUM: 3.8 MMOL/L (ref 3.5–5.1)
RBC # BLD: 2.09 M/UL (ref 4–5.2)
SODIUM BLD-SCNC: 140 MMOL/L (ref 136–145)
WBC # BLD: 6.9 K/UL (ref 4–11)

## 2020-02-25 PROCEDURE — 6360000002 HC RX W HCPCS: Performed by: INTERNAL MEDICINE

## 2020-02-25 PROCEDURE — C9113 INJ PANTOPRAZOLE SODIUM, VIA: HCPCS | Performed by: INTERNAL MEDICINE

## 2020-02-25 PROCEDURE — 1200000000 HC SEMI PRIVATE

## 2020-02-25 PROCEDURE — 6370000000 HC RX 637 (ALT 250 FOR IP): Performed by: INTERNAL MEDICINE

## 2020-02-25 PROCEDURE — 85018 HEMOGLOBIN: CPT

## 2020-02-25 PROCEDURE — 85027 COMPLETE CBC AUTOMATED: CPT

## 2020-02-25 PROCEDURE — 85014 HEMATOCRIT: CPT

## 2020-02-25 PROCEDURE — 36415 COLL VENOUS BLD VENIPUNCTURE: CPT

## 2020-02-25 PROCEDURE — 2580000003 HC RX 258: Performed by: INTERNAL MEDICINE

## 2020-02-25 PROCEDURE — 2700000000 HC OXYGEN THERAPY PER DAY

## 2020-02-25 PROCEDURE — 80048 BASIC METABOLIC PNL TOTAL CA: CPT

## 2020-02-25 PROCEDURE — 94640 AIRWAY INHALATION TREATMENT: CPT

## 2020-02-25 PROCEDURE — 94761 N-INVAS EAR/PLS OXIMETRY MLT: CPT

## 2020-02-25 RX ORDER — 0.9 % SODIUM CHLORIDE 0.9 %
250 INTRAVENOUS SOLUTION INTRAVENOUS ONCE
Status: DISCONTINUED | OUTPATIENT
Start: 2020-02-25 | End: 2020-02-25

## 2020-02-25 RX ADMIN — ATORVASTATIN CALCIUM 20 MG: 10 TABLET, FILM COATED ORAL at 21:09

## 2020-02-25 RX ADMIN — BUDESONIDE 500 MCG: 0.5 SUSPENSION RESPIRATORY (INHALATION) at 09:17

## 2020-02-25 RX ADMIN — Medication 10 ML: at 21:10

## 2020-02-25 RX ADMIN — SERTRALINE HYDROCHLORIDE 50 MG: 50 TABLET ORAL at 10:07

## 2020-02-25 RX ADMIN — Medication 10 ML: at 10:08

## 2020-02-25 RX ADMIN — PANTOPRAZOLE SODIUM 40 MG: 40 INJECTION, POWDER, LYOPHILIZED, FOR SOLUTION INTRAVENOUS at 21:11

## 2020-02-25 RX ADMIN — BUDESONIDE 500 MCG: 0.5 SUSPENSION RESPIRATORY (INHALATION) at 19:46

## 2020-02-25 RX ADMIN — Medication 10 ML: at 21:11

## 2020-02-25 RX ADMIN — PANTOPRAZOLE SODIUM 40 MG: 40 INJECTION, POWDER, LYOPHILIZED, FOR SOLUTION INTRAVENOUS at 10:08

## 2020-02-25 RX ADMIN — CYCLOBENZAPRINE HYDROCHLORIDE 10 MG: 10 TABLET, FILM COATED ORAL at 07:14

## 2020-02-25 RX ADMIN — TIOTROPIUM BROMIDE INHALATION SPRAY 2 PUFF: 3.12 SPRAY, METERED RESPIRATORY (INHALATION) at 09:17

## 2020-02-25 RX ADMIN — FLUTICASONE PROPIONATE 1 SPRAY: 50 SPRAY, METERED NASAL at 10:07

## 2020-02-25 RX ADMIN — CYCLOBENZAPRINE HYDROCHLORIDE 10 MG: 10 TABLET, FILM COATED ORAL at 21:09

## 2020-02-25 RX ADMIN — ACETAMINOPHEN 650 MG: 325 TABLET ORAL at 21:10

## 2020-02-25 ASSESSMENT — PAIN SCALES - GENERAL
PAINLEVEL_OUTOF10: 6
PAINLEVEL_OUTOF10: 0
PAINLEVEL_OUTOF10: 0
PAINLEVEL_OUTOF10: 1

## 2020-02-25 ASSESSMENT — PAIN DESCRIPTION - LOCATION
LOCATION: LEG
LOCATION: LEG

## 2020-02-25 NOTE — PLAN OF CARE
Problem: Nutrition  Goal: Optimal nutrition therapy  Outcome: Ongoing  Note:   Nutrition Problem: Severe malnutrition  Intervention: Food and/or Nutrient Delivery: Continue current diet, Discontinue ONS  Nutritional Goals: Pt will consume greater than 50% of meals this admission

## 2020-02-25 NOTE — PROGRESS NOTES
Nutrition Assessment    Type and Reason for Visit: Initial, Positive Nutrition Screen(weight loss, decreased appetite)    Nutrition Recommendations:   1. Continue general diet  2. D/c Ensure  3. Order new weight  4. Encourage PO intakes   5. Monitor nutrition adequacy, pertinent labs, bowel habits, wt changes, and clinical progress    Nutrition Assessment: Severe malnutrition AEB weight loss and physical assessment. Noted colonoscopy procedure, no active bleeding. Pt reports good appetite today, ate % of meal during visit. Pt endorses eating only 1 meal per day with no snacks, normal per pt. Pt reports being picky with foods and foods tasting different recently. Pt unfavorable to all ONS, will d/c Ensure. Encouraged PO intakes. Order new weight. No further nutrition related questions at this time, will continue to monitor. Malnutrition Assessment:  · Malnutrition Status: Meets the criteria for severe malnutrition  · Context: Chronic illness  · Findings of the 6 clinical characteristics of malnutrition (Minimum of 2 out of 6 clinical characteristics is required to make the diagnosis of moderate or severe Protein Calorie Malnutrition based on AND/ASPEN Guidelines):  1. Energy Intake-Less than or equal to 75% of estimated energy requirement, Greater than or equal to 3 months    2. Weight Loss-10% loss or greater, in 6 months  3. Fat Loss-Severe subcutaneous fat loss, Triceps  4. Muscle Loss-Severe muscle mass loss, Temples (temporalis muscle), Clavicles (pectoralis and deltoids)  5. Fluid Accumulation-Unable to assess,    6.   Strength-Not measured    Nutrition Risk Level: High    Nutrient Needs:  · Estimated Daily Total Kcal: 3871-0809 kcal  · Estimated Daily Protein (g): 48-60 g  · Estimated Daily Total Fluid (ml/day): 1 ml/kcal    Nutrition Diagnosis:   · Problem: Severe malnutrition  · Etiology: related to Insufficient energy/nutrient consumption, Increased demand for energy/nutrients    Signs

## 2020-02-25 NOTE — PROGRESS NOTES
PROGRESS NOTE  S:79 yrs Patient  admitted on 2/23/2020 with GI bleed [K92.2]  GI bleed [K92.2] . No further bleeding. Had blood overnight.   Some RLQ discomfort    Current Hospital Schedued Meds   fluticasone  1 spray Each Nostril Daily    tiotropium  2 puff Inhalation Daily    atorvastatin  20 mg Oral Nightly    budesonide  0.5 mg Nebulization BID    sertraline  50 mg Oral Daily    sodium chloride flush  10 mL Intravenous 2 times per day    pantoprazole  40 mg Intravenous Q12H    And    sodium chloride (PF)  10 mL Intravenous Q12H     Current Hospital IV Meds    Current Hospital PRN Meds  albuterol sulfate HFA, sodium chloride flush, acetaminophen, promethazine, ondansetron, cyclobenzaprine    Exam:   Vitals:    02/25/20 0918   BP:    Pulse:    Resp:    Temp:    SpO2: 99%     I/O last 3 completed shifts:  In: -   Out: 200 [Urine:200]   General appearance: alert, appears stated age and cooperative  HEENT: PERRLA  Neck: no adenopathy, no carotid bruit, no JVD, supple, symmetrical, trachea midline and thyroid not enlarged, symmetric, no tenderness/mass/nodules  Lungs: clear to auscultation bilaterally  Heart: regular rate and rhythm, S1, S2 normal, no murmur, click, rub or gallop  Abdomen: soft, non-tender; bowel sounds normal; no masses,  no organomegaly  Extremities: extremities normal, atraumatic, no cyanosis or edema     Labs:  CBC:   Recent Labs     02/24/20  0246 02/24/20  1002 02/24/20  1804 02/25/20  0249 02/25/20  1034   WBC 7.0 6.0  --  6.9  --    HGB 7.8* 8.6* 8.2* 7.3*  7.3* 8.5*   HCT 23.0* 24.3* 24.0* 20.5*  21.5* 25.4*   MCV 95.0 103.2*  --  102.7*  --     165  --  152  --      BMP:   Recent Labs     02/23/20  0730 02/24/20  0246 02/25/20  0249    140 140   K 5.1 3.5 3.8    104 105   CO2 25 26 26   BUN 49* 33* 16   CREATININE 1.3* 1.1 1.1     LIVER PROFILE: No results for input(s): AST, ALT, LIPASE, PROT, BILIDIR, BILITOT, ALKPHOS in

## 2020-02-26 LAB
HCT VFR BLD CALC: 22.2 % (ref 36–48)
HCT VFR BLD CALC: 22.4 % (ref 36–48)
HCT VFR BLD CALC: 23.6 % (ref 36–48)
HEMOGLOBIN: 7.5 G/DL (ref 12–16)
HEMOGLOBIN: 7.5 G/DL (ref 12–16)
HEMOGLOBIN: 8.1 G/DL (ref 12–16)

## 2020-02-26 PROCEDURE — 2580000003 HC RX 258: Performed by: INTERNAL MEDICINE

## 2020-02-26 PROCEDURE — 94761 N-INVAS EAR/PLS OXIMETRY MLT: CPT

## 2020-02-26 PROCEDURE — 6370000000 HC RX 637 (ALT 250 FOR IP): Performed by: INTERNAL MEDICINE

## 2020-02-26 PROCEDURE — 1200000000 HC SEMI PRIVATE

## 2020-02-26 PROCEDURE — 2700000000 HC OXYGEN THERAPY PER DAY

## 2020-02-26 PROCEDURE — C9113 INJ PANTOPRAZOLE SODIUM, VIA: HCPCS | Performed by: INTERNAL MEDICINE

## 2020-02-26 PROCEDURE — 85018 HEMOGLOBIN: CPT

## 2020-02-26 PROCEDURE — 36415 COLL VENOUS BLD VENIPUNCTURE: CPT

## 2020-02-26 PROCEDURE — 6360000002 HC RX W HCPCS: Performed by: INTERNAL MEDICINE

## 2020-02-26 PROCEDURE — 85014 HEMATOCRIT: CPT

## 2020-02-26 PROCEDURE — 94640 AIRWAY INHALATION TREATMENT: CPT

## 2020-02-26 RX ORDER — SODIUM PHOSPHATE, DIBASIC AND SODIUM PHOSPHATE, MONOBASIC 3.5; 9.5 G/66ML; G/66ML
ENEMA RECTAL ONCE
Status: COMPLETED | OUTPATIENT
Start: 2020-02-27 | End: 2020-02-27

## 2020-02-26 RX ADMIN — SERTRALINE HYDROCHLORIDE 50 MG: 50 TABLET ORAL at 10:22

## 2020-02-26 RX ADMIN — BUDESONIDE 500 MCG: 0.5 SUSPENSION RESPIRATORY (INHALATION) at 09:09

## 2020-02-26 RX ADMIN — Medication 10 ML: at 10:22

## 2020-02-26 RX ADMIN — Medication 2 PUFF: at 19:27

## 2020-02-26 RX ADMIN — TIOTROPIUM BROMIDE INHALATION SPRAY 2 PUFF: 3.12 SPRAY, METERED RESPIRATORY (INHALATION) at 09:09

## 2020-02-26 RX ADMIN — PANTOPRAZOLE SODIUM 40 MG: 40 INJECTION, POWDER, LYOPHILIZED, FOR SOLUTION INTRAVENOUS at 10:22

## 2020-02-26 RX ADMIN — BUDESONIDE 500 MCG: 0.5 SUSPENSION RESPIRATORY (INHALATION) at 19:22

## 2020-02-26 RX ADMIN — ATORVASTATIN CALCIUM 20 MG: 10 TABLET, FILM COATED ORAL at 21:24

## 2020-02-26 RX ADMIN — Medication 10 ML: at 19:27

## 2020-02-26 RX ADMIN — PANTOPRAZOLE SODIUM 40 MG: 40 INJECTION, POWDER, LYOPHILIZED, FOR SOLUTION INTRAVENOUS at 21:24

## 2020-02-26 RX ADMIN — Medication 10 ML: at 21:24

## 2020-02-26 RX ADMIN — CYCLOBENZAPRINE HYDROCHLORIDE 10 MG: 10 TABLET, FILM COATED ORAL at 23:36

## 2020-02-26 RX ADMIN — ONDANSETRON 4 MG: 2 INJECTION INTRAMUSCULAR; INTRAVENOUS at 19:26

## 2020-02-26 NOTE — PROGRESS NOTES
PROGRESS NOTE  S:79 yrs Patient  admitted on 2/23/2020 with GI bleed [K92.2]  GI bleed [K92.2] . No further bleeding. Still monitoring hemoglobin. Discussed plan of care. Current Hospital Schedued Meds   [START ON 2/27/2020] sodium phosphate   Rectal Once    fluticasone  1 spray Each Nostril Daily    tiotropium  2 puff Inhalation Daily    atorvastatin  20 mg Oral Nightly    budesonide  0.5 mg Nebulization BID    sertraline  50 mg Oral Daily    sodium chloride flush  10 mL Intravenous 2 times per day    pantoprazole  40 mg Intravenous Q12H    And    sodium chloride (PF)  10 mL Intravenous Q12H     Current Hospital IV Meds    Current Hospital PRN Meds  albuterol sulfate HFA, sodium chloride flush, acetaminophen, promethazine, ondansetron, cyclobenzaprine    Exam:   Vitals:    02/26/20 0754   BP: 127/78   Pulse: 83   Resp: 18   Temp: 97.9 °F (36.6 °C)   SpO2: 100%     No intake/output data recorded. General appearance: alert, appears stated age and cooperative  HEENT: PERRLA  Neck: no adenopathy, no carotid bruit, no JVD, supple, symmetrical, trachea midline and thyroid not enlarged, symmetric, no tenderness/mass/nodules  Lungs: clear to auscultation bilaterally  Heart: regular rate and rhythm, S1, S2 normal, no murmur, click, rub or gallop  Abdomen: soft, non-tender; bowel sounds normal; no masses,  no organomegaly  Extremities: extremities normal, atraumatic, no cyanosis or edema     Labs:  CBC:   Recent Labs     02/24/20  0246 02/24/20  1002  02/25/20  0249  02/25/20  1646 02/25/20  2357 02/26/20  0705   WBC 7.0 6.0  --  6.9  --   --   --   --    HGB 7.8* 8.6*   < > 7.3*  7.3*   < > 7.4* 7.5* 7.5*   HCT 23.0* 24.3*   < > 20.5*  21.5*   < > 21.2* 22.4* 22.2*   MCV 95.0 103.2*  --  102.7*  --   --   --   --     165  --  152  --   --   --   --     < > = values in this interval not displayed.      BMP:   Recent Labs     02/24/20  0246 02/25/20  0249   NA 140 140   K 3.5 3.8    105   CO2 26 26   BUN 33* 16   CREATININE 1.1 1.1     LIVER PROFILE: No results for input(s): AST, ALT, LIPASE, PROT, BILIDIR, BILITOT, ALKPHOS in the last 72 hours. Invalid input(s): AMYLASE,  ALB  PT/INR: No results for input(s): INR in the last 72 hours. Invalid input(s): PT    IMAGING:  No results found. Hospital Problems           Last Modified POA    GI bleed 2/23/2020 Yes    Severe malnutrition (HCC) (Chronic) 2/25/2020 Yes         Impression:  68 yo female with hematochezia    Recommendation:  1. After further discussion with patient and fmaily, will plan flex sigmoidoscopy with hemorrhoid banding in the am.  [id number] [address] Alaniz with discharge after procedure if hgb stable. Hold plavix for 2 weeks. Expect some oozing after bands slough off in 48-72 hours.       Edmundo Perez DO  10:26 AM 2/26/2020            [address] [place] [address]    [address]      [address] [place] [address]     Phone: [phone]     Fax: [phone]

## 2020-02-26 NOTE — PROGRESS NOTES
Hospitalist Progress Note      PCP: Pavel Ferrell MD    Date of Admission: 2/23/2020    Chief Complaint: GI bleed     Hospital Course:   67 y.o. female who presented to [facility] with GI bleed. Patient has had diarrhea for the past couple days. This morning she noted bright red blood in the toilet after her BM. She is unsure is she had any bleeding with the prior episodes of diarrhea. Patient states she had a prior GI bleed years ago related to hemorrhoids. She has never had a colonoscopy. She denies abdominal pain. She has had some nausea since the diarrhea began. Subjective:   Pt is on 2 LPM. Afebrile. VSS. No dyspnea or chest pain. No N/V/D. Pt's daughter is at bedside. Medications:  Reviewed    Infusion Medications   Scheduled Medications    fluticasone  1 spray Each Nostril Daily    tiotropium  2 puff Inhalation Daily    atorvastatin  20 mg Oral Nightly    budesonide  0.5 mg Nebulization BID    sertraline  50 mg Oral Daily    sodium chloride flush  10 mL Intravenous 2 times per day    pantoprazole  40 mg Intravenous Q12H    And    sodium chloride (PF)  10 mL Intravenous Q12H     PRN Meds: albuterol sulfate HFA, sodium chloride flush, acetaminophen, promethazine, ondansetron, cyclobenzaprine      Intake/Output Summary (Last 24 hours) at 2/25/2020 2002  Last data filed at 2/25/2020 0038  Gross per 24 hour   Intake --   Output 100 ml   Net -100 ml       Physical Exam Performed:    /78   Pulse 89   Temp 98.5 °F (36.9 °C) (Oral)   Resp 16   Ht 5' 3\" (1.6 m)   Wt 90 lb (40.8 kg)   SpO2 99%   BMI 15.94 kg/m²     General appearance: Elderly female in no apparent distress, appears stated age and cooperative. HEENT: Pupils equal, round, and reactive to light. Conjunctivae/corneas clear. Neck: Supple, with full range of motion. No jugular venous distention. Trachea midline. Respiratory:  Normal respiratory effort.  Clear to auscultation, bilaterally without Rales/Wheezes/Rhonchi. Cardiovascular: Regular rate and rhythm with normal S1/S2 without murmurs, rubs or gallops. Abdomen: Soft, non-tender, non-distended with normal bowel sounds. Musculoskeletal: No clubbing, cyanosis or edema bilaterally. Full range of motion without deformity. Skin: Skin color, texture, turgor normal.  No rashes or lesions. Neurologic:  Neurovascularly intact without any focal sensory/motor deficits. Cranial nerves: II-XII intact, grossly non-focal.  Psychiatric: Alert and oriented, thought content appropriate, normal insight  Capillary Refill: Brisk,< 3 seconds   Peripheral Pulses: +2 palpable, equal bilaterally       Labs:   Recent Labs     02/24/20  0246 02/24/20  1002  02/25/20  0249 02/25/20  1034 02/25/20  1646   WBC 7.0 6.0  --  6.9  --   --    HGB 7.8* 8.6*   < > 7.3*  7.3* 8.5* 7.4*   HCT 23.0* 24.3*   < > 20.5*  21.5* 25.4* 21.2*    165  --  152  --   --     < > = values in this interval not displayed. Recent Labs     02/23/20  0730 02/24/20  0246 02/25/20  0249    140 140   K 5.1 3.5 3.8    104 105   CO2 25 26 26   BUN 49* 33* 16   CREATININE 1.3* 1.1 1.1   CALCIUM 9.7 8.5 8.2*     Urinalysis:      Lab Results   Component Value Date    NITRU POSITIVE 09/18/2019    WBCUA 10-20 09/18/2019    BACTERIA 1+ 09/18/2019    RBCUA 3-5 09/18/2019    BLOODU Negative 09/18/2019    SPECGRAV 1.015 09/18/2019    GLUCOSEU Negative 09/18/2019       Radiology:  No orders to display       Assessment/Plan:    Active Hospital Problems    Diagnosis    Severe malnutrition (HCC) [E43]    GI bleed [K92.2]       Acute blood loss anemia 2/2 suspected lower GI bleed  - Possible hemorrhoid bleeding.  - Holding home ASA, plavix. - Has chronic anemia but is acutely worsened. - Received 1 unit PRBC on 2/23.  - Continue to trend H/H, transfuse for Hgb less than 7 and/or symptomatic  - GI consulted/following.  S/p colonoscopy on 2/24, poor prep, no active bleeding noted.      Coronary artery disease s/p CABG:  - No active chest pain  - Hold ASA, plavix for now. Will need to clarify timing of when to resume. Pt/family reports that she has been off of the aspirin per her Cardiologist for the last 2 months.      Chronic kidney disease stage 3:   - Cr at baseline. Monitor.      Hypertension:  - Well controlled. - Holding home BP meds for now in setting of GI bleed.      Hyperlipidemia:  - Continue home statin.     COPD:  - No evidence of exacerbation.  - Continue home inhalers. DVT Prophylaxis: SCDs  Diet: DIET GENERAL;  Code Status: Full Code    PT/OT Eval Status: Not indicated    Dispo - Hopefully tomorrow if H&H remains stable.      [address], APRN - CNP

## 2020-02-27 VITALS
TEMPERATURE: 97.6 F | OXYGEN SATURATION: 100 % | DIASTOLIC BLOOD PRESSURE: 78 MMHG | WEIGHT: 90 LBS | SYSTOLIC BLOOD PRESSURE: 167 MMHG | HEIGHT: 63 IN | RESPIRATION RATE: 20 BRPM | BODY MASS INDEX: 15.95 KG/M2 | HEART RATE: 91 BPM

## 2020-02-27 LAB
BLOOD BANK DISPENSE STATUS: NORMAL
BLOOD BANK DISPENSE STATUS: NORMAL
BLOOD BANK PRODUCT CODE: NORMAL
BLOOD BANK PRODUCT CODE: NORMAL
BPU ID: NORMAL
BPU ID: NORMAL
DESCRIPTION BLOOD BANK: NORMAL
DESCRIPTION BLOOD BANK: NORMAL
HCT VFR BLD CALC: 23.6 % (ref 36–48)
HEMOGLOBIN: 8.3 G/DL (ref 12–16)

## 2020-02-27 PROCEDURE — 6360000002 HC RX W HCPCS: Performed by: INTERNAL MEDICINE

## 2020-02-27 PROCEDURE — 2720000010 HC SURG SUPPLY STERILE: Performed by: INTERNAL MEDICINE

## 2020-02-27 PROCEDURE — C9113 INJ PANTOPRAZOLE SODIUM, VIA: HCPCS | Performed by: INTERNAL MEDICINE

## 2020-02-27 PROCEDURE — 85014 HEMATOCRIT: CPT

## 2020-02-27 PROCEDURE — 6370000000 HC RX 637 (ALT 250 FOR IP): Performed by: INTERNAL MEDICINE

## 2020-02-27 PROCEDURE — 2580000003 HC RX 258: Performed by: INTERNAL MEDICINE

## 2020-02-27 PROCEDURE — 36415 COLL VENOUS BLD VENIPUNCTURE: CPT

## 2020-02-27 PROCEDURE — 85018 HEMOGLOBIN: CPT

## 2020-02-27 PROCEDURE — 3609008400 HC SIGMOIDOSCOPY DIAGNOSTIC: Performed by: INTERNAL MEDICINE

## 2020-02-27 PROCEDURE — 7100000011 HC PHASE II RECOVERY - ADDTL 15 MIN: Performed by: INTERNAL MEDICINE

## 2020-02-27 PROCEDURE — 0W3P8ZZ CONTROL BLEEDING IN GASTROINTESTINAL TRACT, VIA NATURAL OR ARTIFICIAL OPENING ENDOSCOPIC: ICD-10-PCS | Performed by: INTERNAL MEDICINE

## 2020-02-27 PROCEDURE — 2709999900 HC NON-CHARGEABLE SUPPLY: Performed by: INTERNAL MEDICINE

## 2020-02-27 PROCEDURE — 7100000010 HC PHASE II RECOVERY - FIRST 15 MIN: Performed by: INTERNAL MEDICINE

## 2020-02-27 PROCEDURE — 99152 MOD SED SAME PHYS/QHP 5/>YRS: CPT | Performed by: INTERNAL MEDICINE

## 2020-02-27 RX ORDER — SODIUM CHLORIDE, SODIUM LACTATE, POTASSIUM CHLORIDE, CALCIUM CHLORIDE 600; 310; 30; 20 MG/100ML; MG/100ML; MG/100ML; MG/100ML
1000 INJECTION, SOLUTION INTRAVENOUS ONCE
Status: COMPLETED | OUTPATIENT
Start: 2020-02-27 | End: 2020-02-27

## 2020-02-27 RX ORDER — DOCUSATE SODIUM 100 MG/1
100 CAPSULE, LIQUID FILLED ORAL DAILY
Status: DISCONTINUED | OUTPATIENT
Start: 2020-02-27 | End: 2020-02-27 | Stop reason: HOSPADM

## 2020-02-27 RX ADMIN — DOCUSATE SODIUM 100 MG: 100 CAPSULE, LIQUID FILLED ORAL at 09:36

## 2020-02-27 RX ADMIN — FLUTICASONE PROPIONATE 1 SPRAY: 50 SPRAY, METERED NASAL at 09:39

## 2020-02-27 RX ADMIN — SODIUM CHLORIDE, POTASSIUM CHLORIDE, SODIUM LACTATE AND CALCIUM CHLORIDE 1000 ML: 600; 310; 30; 20 INJECTION, SOLUTION INTRAVENOUS at 07:30

## 2020-02-27 RX ADMIN — PANTOPRAZOLE SODIUM 40 MG: 40 INJECTION, POWDER, LYOPHILIZED, FOR SOLUTION INTRAVENOUS at 09:36

## 2020-02-27 RX ADMIN — SERTRALINE HYDROCHLORIDE 50 MG: 50 TABLET ORAL at 09:36

## 2020-02-27 RX ADMIN — Medication 10 ML: at 09:36

## 2020-02-27 RX ADMIN — SODIUM PHOSPHATE, DIBASIC AND SODIUM PHOSPHATE, MONOBASIC: 3.5; 9.5 ENEMA RECTAL at 06:35

## 2020-02-27 ASSESSMENT — PAIN SCALES - GENERAL
PAINLEVEL_OUTOF10: 3
PAINLEVEL_OUTOF10: 5
PAINLEVEL_OUTOF10: 0
PAINLEVEL_OUTOF10: 0

## 2020-02-27 ASSESSMENT — PAIN DESCRIPTION - LOCATION
LOCATION: ABDOMEN
LOCATION: RECTUM

## 2020-02-27 ASSESSMENT — PAIN DESCRIPTION - PAIN TYPE: TYPE: ACUTE PAIN

## 2020-02-27 ASSESSMENT — PAIN - FUNCTIONAL ASSESSMENT: PAIN_FUNCTIONAL_ASSESSMENT: 0-10

## 2020-02-27 NOTE — DISCHARGE SUMMARY
Hospital Medicine Discharge Summary    Patient ID: Leonarda Bal      Patient's PCP: Lelo Carl MD    Admit Date: 2/23/2020     Discharge Date: 2/27/2020      Admitting Physician: Luca Julian MD     Discharge Physician: CLIFTON Carcamo - CNP     Discharge Diagnoses: Active Hospital Problems    Diagnosis    Severe malnutrition ([facility] [address] [E43]    GI bleed [K92.2]       The patient was seen and examined on day of discharge and this discharge summary is in conjunction with any daily progress note from day of discharge. Hospital Course:   67 y. o. female who presented to [facility] with GI bleed. Patient has had diarrhea for the past couple days. This morning she noted bright red blood in the toilet after her BM. She is unsure if she had any bleeding with the prior episodes of diarrhea. Patient states she had a prior GI bleed years ago related to hemorrhoids. She has never had a colonoscopy. She denies abdominal pain. She has had some nausea since the diarrhea began.     Acute blood loss anemia 2/2 suspected lower GI bleed  - Has chronic anemia but is acutely worsened. Possible hemorrhoid bleeding.  - Continue to hold home ASA and plavix. - Pt received 1 unit PRBC on 2/23. H&H is stable at 8.3.   - GI consulted. S/p colonoscopy on 2/24, poor prep, normal terminal ileum w/o blood, stool seen throughout colon, no active bleeding, no sig diverticulosis seen, medium hemorrhoids, no stigmata of recent bleeding or obvious fissure. S/p flex sig on 2/27, 4 bands were placed on hemorrhoids, a small linear tear was seen in the posterior rectum. - Minimize straining, continue colace, nifedipine/lidocaine ointment (paper script given to pt) for 8 weeks per GI. Follow-up with GI in the clinic as needed.      Coronary artery disease s/p CABG:  - No active chest pain. - Holding aspirin and plavix.  Family reports that pt has been off of aspirin for 2 months now per her Cardiologist. GI recommends to hold plavix for 2 weeks.      Chronic kidney disease stage 3:   - Cr at baseline. Monitor.      Hypertension:  - Well controlled. Held BP meds while inpt in setting of GI bleed, can resume at dc.     Hyperlipidemia:  - Continue home statin.     COPD:  - No evidence of exacerbation.  - Continue home inhalers. Physical Exam Performed:     BP (!) 167/78   Pulse 91   Temp 97.6 °F (36.4 °C) (Oral)   Resp 20   Ht 5' 3\" (1.6 m)   Wt 90 lb (40.8 kg)   SpO2 100%   BMI 15.94 kg/m²       General appearance:  Elderly female in no apparent distress, appears stated age and cooperative. HEENT:  Normal cephalic, atraumatic without obvious deformity. Pupils equal, round, and reactive to light. Extra ocular muscles intact. Conjunctivae/corneas clear. Neck: Supple, with full range of motion. No jugular venous distention. Trachea midline. Respiratory:  Normal respiratory effort. Clear to auscultation, bilaterally without Rales/Wheezes/Rhonchi. Cardiovascular:  Regular rate and rhythm with normal S1/S2 without murmurs, rubs or gallops. Abdomen: Soft, non-tender, non-distended with normal bowel sounds. Musculoskeletal:  No clubbing, cyanosis or edema bilaterally. Full range of motion without deformity. Skin: Skin color, texture, turgor normal.  No rashes or lesions. Neurologic:  Neurovascularly intact without any focal sensory/motor deficits. Cranial nerves: II-XII intact, grossly non-focal.  Psychiatric:  Alert and oriented, thought content appropriate, normal insight  Capillary Refill: Brisk,< 3 seconds   Peripheral Pulses: +2 palpable, equal bilaterally       Labs:  For convenience and continuity at follow-up the following most recent labs are provided:      CBC:    Lab Results   Component Value Date    WBC 6.9 02/25/2020    HGB 8.3 02/27/2020    HCT 23.6 02/27/2020     02/25/2020       Renal:    Lab Results   Component Value Date     02/25/2020    K 3.8 02/25/2020     02/25/2020 DULCOLAX) 100 MG CAPS Take 100 mg by mouth 2 times daily, Disp-30 capsule, R-0Normal      tiotropium (SPIRIVA HANDIHALER) 18 MCG inhalation capsule Inhale 18 mcg into the lungs dailyHistorical Med             Time Spent on discharge is more than 45 minutes in the examination, evaluation, counseling and review of medications and discharge plan. Signed:    CLIFTON Tello - CNP   2/27/2020      Thank you Prachi Pinedo MD for the opportunity to be involved in this patient's care. If you have any questions or concerns please feel free to contact me at [phone].

## 2020-02-27 NOTE — H&P
Gastroenterology Preop Assessment    Patient:   Karen Ghotra   :       Facility:   [facility]  Referring/PCP: Dee Larson MD  Date:     2020    Subjective:   Procedure: Flex sigmoidoscopy with banding    HPI/Reason for procedure:  Rectal bleeding    Past Medical History:   Diagnosis Date    [facility]JA [facility]) ([facility] [address] [facility]     Asthma     COPD (chronic obstructive pulmonary disease) ([facility] [address] [facility]     Hyperlipidemia     Hypertension     MRSA (methicillin resistant staph aureus) culture positive 2019    + resp cx    OA (osteoarthritis) 2014     Past Surgical History:   Procedure Laterality Date    BRONCHOSCOPY  2019    Dr. Randall - w/BAL   [address]  2019    Dr. Audra Raines N/A 2020    COLONOSCOPY DIAGNOSTIC performed by Ramon Bowden DO at [address] N/A 2019    OFF PUMP CORONARY ARTERY BYPASS GRAFTING X2, INTERNAL MAMMARY ARTERY, SAPHENOUS VEIN GRAFT performed by Esmer Saldaña MD at [address], PARTIAL Left        Social:   Social History     Tobacco Use    Smoking status: Former Smoker     Packs/day: 0.50     Years: 50.00     Pack years: 25.00     Types: Cigarettes     Last attempt to quit: 2019     Years since quittin.4    Smokeless tobacco: Never Used    Tobacco comment: advised to quit   Substance Use Topics    Alcohol use: No     Family:   Family History   Problem Relation Age of Onset    Cancer Mother     Heart Disease Father     Stroke Father     Heart Disease Sister     Stroke Sister        Scheduled Medications:    fluticasone  1 spray Each Nostril Daily    tiotropium  2 puff Inhalation Daily    atorvastatin  20 mg Oral Nightly    budesonide  0.5 mg Nebulization BID    sertraline  50 mg Oral Daily    sodium chloride flush  10 mL Intravenous 2 times per day    pantoprazole  40 mg Intravenous

## 2020-02-27 NOTE — OP NOTE
Flex sigmoidoscopy Note    Patient:   Luciana Claudio    YOB: 1940    Facility:   [facility] [Inpatient]  Referring/PCP: Dennis Hammer MD  Procedure:   Flex sigmoidoscopy  Date:     2/27/2020  Endoscopist:  Beatriz Duran DO    Preoperative Diagnosis:  Hemorrhoids. Postoperative Diagnosis:  Hemorrhoids    Anesthesia: none    Estimated blood loss: < 5 ml    Complications:  None    Description of Procedure:  Informed consent was obtained from the patient after explanation of the procedure including indications, description of the procedure,  benefits and possible risks and complications of the procedure, and alternatives. Questions were answered. The patient's history was reviewed and a directed physical examination was performed prior to the procedure. Patient was monitored throughout the procedure with pulse oximetry and periodic assessment of vital signs. Patient was sedated as noted above. The Nursing staff and I performed a time out. With the patient initially in the left lateral decubitus position, a digital rectal examination was performed and revealed negative without mass, lesions or tenderness. The Olympus video colonoscope was placed in the patient's rectum and advanced without difficulty  to the sigmoid colon. Photographs were taken. The prep was fair. Examination of the mucosa was performed during both introduction and withdrawal of the colonoscope. Retroflexed view of the rectum was performed. Findings:     1. Using a Balta Six Shooter , 4 bands were placed in a Johanna fashion with patient feedback   2.  A small linear tear was seen in the posterior rectum with some tenderness     Recommendations:    Minimize straining  Colace 100 mg bid for 4 weeks  Nifedipine/lidocaine ointment for 8 weeks  Restart plavix in 2 weeks  FU in GI clinic as needed    Beatriz Duran DO

## 2020-07-02 ENCOUNTER — VIRTUAL VISIT (OUTPATIENT)
Dept: CARDIOLOGY CLINIC | Age: 80
End: 2020-07-02
Payer: MEDICARE

## 2020-07-02 PROCEDURE — 4040F PNEUMOC VAC/ADMIN/RCVD: CPT | Performed by: INTERNAL MEDICINE

## 2020-07-02 PROCEDURE — G8427 DOCREV CUR MEDS BY ELIG CLIN: HCPCS | Performed by: INTERNAL MEDICINE

## 2020-07-02 PROCEDURE — 1036F TOBACCO NON-USER: CPT | Performed by: INTERNAL MEDICINE

## 2020-07-02 PROCEDURE — 99442 PR PHYS/QHP TELEPHONE EVALUATION 11-20 MIN: CPT | Performed by: INTERNAL MEDICINE

## 2020-07-02 PROCEDURE — G8419 CALC BMI OUT NRM PARAM NOF/U: HCPCS | Performed by: INTERNAL MEDICINE

## 2020-07-02 PROCEDURE — G8400 PT W/DXA NO RESULTS DOC: HCPCS | Performed by: INTERNAL MEDICINE

## 2020-07-02 PROCEDURE — 1090F PRES/ABSN URINE INCON ASSESS: CPT | Performed by: INTERNAL MEDICINE

## 2020-07-02 PROCEDURE — 1123F ACP DISCUSS/DSCN MKR DOCD: CPT | Performed by: INTERNAL MEDICINE

## 2020-07-02 ASSESSMENT — ENCOUNTER SYMPTOMS
CHOKING: 0
SHORTNESS OF BREATH: 1
COUGH: 0
CHEST TIGHTNESS: 0

## 2020-07-02 NOTE — PROGRESS NOTES
Subjective:      Patient ID: Mikaela Casas is a 67 y.o. female. Coronary Artery Disease   Symptoms include shortness of breath. Pertinent negatives include no chest pain, chest tightness, dizziness, leg swelling or palpitations. Requested and agreed to phone visit. Follow up after Non st/CAD/CABG. CV stable. PCP following for anemia. Feels anxiety is problem. Some sob in humidity. No angina. No edema. No tachy/syncope. Appetite down. Watches salt. Past Medical History:   Diagnosis Date    [facility]) ([facility] [address]     Asthma     COPD (chronic obstructive pulmonary disease) ([facility] [address]     Hyperlipidemia     Hypertension     MRSA (methicillin resistant staph aureus) culture positive 09/22/2019    + resp cx    OA (osteoarthritis) 8/12/2014     Past Surgical History:   Procedure Laterality Date    BRONCHOSCOPY  09/08/2019    Dr. Mirlande Flores - w/BAL   [address]  09/05/2019    Dr. Thao Carrillo 2/24/2020    COLONOSCOPY DIAGNOSTIC performed by Erin Gamino DO at [address] [facility] N/A 9/19/2019    OFF PUMP CORONARY ARTERY BYPASS GRAFTING X2, INTERNAL MAMMARY ARTERY, SAPHENOUS VEIN GRAFT performed by Ekaterina Nguyen MD at [address] , PARTIAL Left     SIGMOIDOSCOPY  02/27/2020    4 bands    SIGMOIDOSCOPY N/A 2/27/2020    FLEX SIG W/ BANDING SIGMOIDOSCOPY DIAGNOSTIC FLEXIBLE performed by Erin Gamino DO at [address] [place] [address] History     Socioeconomic History    Marital status:       Spouse name: Not on file    Number of children: Not on file    Years of education: Not on file    Highest education level: Not on file   Occupational History    Not on file   Social Needs    Financial resource strain: Not on file    Food insecurity     Worry: Not on file     Inability: Not on file    Transportation needs     Medical: Not on file     Non-medical: Not on file Tobacco Use    Smoking status: Former Smoker     Packs/day: 0.50     Years: 50.00     Pack years: 25.00     Types: Cigarettes     Last attempt to quit: 2019     Years since quittin.7    Smokeless tobacco: Never Used    Tobacco comment: advised to quit   Substance and Sexual Activity    Alcohol use: No    Drug use: No    Sexual activity: Not Currently     Comment: tabacco- Pk Day   Lifestyle    Physical activity     Days per week: Not on file     Minutes per session: Not on file    Stress: Not on file   Relationships    Social connections     Talks on phone: Not on file     Gets together: Not on file     Attends Taoist service: Not on file     Active member of club or organization: Not on file     Attends meetings of clubs or organizations: Not on file     Relationship status: Not on file    Intimate partner violence     Fear of current or ex partner: Not on file     Emotionally abused: Not on file     Physically abused: Not on file     Forced sexual activity: Not on file   Other Topics Concern    Not on file   Social History Narrative    Not on file     FH reviewed, Fa/sist heart disease    She did not take vitals      Review of Systems   Constitutional: Negative for activity change, appetite change and fatigue. Respiratory: Positive for shortness of breath. Negative for cough, choking and chest tightness. Cardiovascular: Negative for chest pain, palpitations and leg swelling. Denies PND or orthopnea. No tachycardia or syncope. Neurological: Negative for dizziness, syncope and light-headedness. Psychiatric/Behavioral: Negative for agitation, behavioral problems and confusion. All other systems reviewed and are negative. Assessment:       Diagnosis Orders   1. CAD in native artery     2. Hx of CABG     3. Non-ST elevated myocardial infarction (non-STEMI) ([facility] [address]             Plan:      Requested and agreed to phone visit.  Estimated time 15-20 minutes spent on phone with patient. CV stable appears stable. Sob in humidity. Ok inside. No angina. Compensated. Not smoking. Reviewed previous records and testing including hosp/rehab after CABG. No changes. Continue to monitor. Follow up 3 months.          Nelly Mack MD

## 2020-08-07 ENCOUNTER — HOSPITAL ENCOUNTER (OUTPATIENT)
Dept: GENERAL RADIOLOGY | Age: 80
Discharge: HOME | End: 2020-08-07
Payer: MEDICARE

## 2020-08-07 PROCEDURE — 71046 X-RAY EXAM CHEST 2 VIEWS: CPT

## 2020-09-18 ENCOUNTER — APPOINTMENT (OUTPATIENT)
Dept: CT IMAGING | Age: 80
End: 2020-09-18
Payer: MEDICARE

## 2020-09-18 ENCOUNTER — HOSPITAL ENCOUNTER (OUTPATIENT)
Age: 80
Setting detail: OBSERVATION
Discharge: HOME | End: 2020-09-19
Attending: EMERGENCY MEDICINE | Admitting: INTERNAL MEDICINE
Payer: MEDICARE

## 2020-09-18 ENCOUNTER — APPOINTMENT (OUTPATIENT)
Dept: GENERAL RADIOLOGY | Age: 80
End: 2020-09-18
Payer: MEDICARE

## 2020-09-18 PROBLEM — R07.9 CHEST PAIN: Status: ACTIVE | Noted: 2020-09-18

## 2020-09-18 LAB
A/G RATIO: 1.4 (ref 1.1–2.2)
ALBUMIN SERPL-MCNC: 4.2 G/DL (ref 3.4–5)
ALP BLD-CCNC: 71 U/L (ref 40–129)
ALT SERPL-CCNC: 9 U/L (ref 10–40)
ANION GAP SERPL CALCULATED.3IONS-SCNC: 11 MMOL/L (ref 3–16)
AST SERPL-CCNC: 14 U/L (ref 15–37)
BACTERIA: ABNORMAL /HPF
BASOPHILS ABSOLUTE: 0.1 K/UL (ref 0–0.2)
BASOPHILS RELATIVE PERCENT: 1.3 %
BILIRUB SERPL-MCNC: <0.2 MG/DL (ref 0–1)
BILIRUBIN URINE: NEGATIVE
BLOOD, URINE: NEGATIVE
BUN BLDV-MCNC: 37 MG/DL (ref 7–20)
CALCIUM SERPL-MCNC: 10.1 MG/DL (ref 8.3–10.6)
CHLORIDE BLD-SCNC: 101 MMOL/L (ref 99–110)
CLARITY: CLEAR
CO2: 28 MMOL/L (ref 21–32)
COLOR: YELLOW
CREAT SERPL-MCNC: 1.6 MG/DL (ref 0.6–1.2)
EOSINOPHILS ABSOLUTE: 0.4 K/UL (ref 0–0.6)
EOSINOPHILS RELATIVE PERCENT: 5.7 %
EPITHELIAL CELLS, UA: ABNORMAL /HPF (ref 0–5)
GFR AFRICAN AMERICAN: 38
GFR NON-AFRICAN AMERICAN: 31
GLOBULIN: 2.9 G/DL
GLUCOSE BLD-MCNC: 95 MG/DL (ref 70–99)
GLUCOSE URINE: NEGATIVE MG/DL
HCT VFR BLD CALC: 37.6 % (ref 36–48)
HEMOGLOBIN: 12.2 G/DL (ref 12–16)
KETONES, URINE: NEGATIVE MG/DL
LEUKOCYTE ESTERASE, URINE: ABNORMAL
LIPASE: 107 U/L (ref 13–60)
LYMPHOCYTES ABSOLUTE: 1.6 K/UL (ref 1–5.1)
LYMPHOCYTES RELATIVE PERCENT: 24.6 %
MAGNESIUM: 2 MG/DL (ref 1.8–2.4)
MCH RBC QN AUTO: 29.1 PG (ref 26–34)
MCHC RBC AUTO-ENTMCNC: 32.5 G/DL (ref 31–36)
MCV RBC AUTO: 89.3 FL (ref 80–100)
MICROSCOPIC EXAMINATION: YES
MONOCYTES ABSOLUTE: 0.5 K/UL (ref 0–1.3)
MONOCYTES RELATIVE PERCENT: 7 %
NEUTROPHILS ABSOLUTE: 4.1 K/UL (ref 1.7–7.7)
NEUTROPHILS RELATIVE PERCENT: 61.4 %
NITRITE, URINE: NEGATIVE
PDW BLD-RTO: 17.3 % (ref 12.4–15.4)
PH UA: 5.5 (ref 5–8)
PLATELET # BLD: 251 K/UL (ref 135–450)
PMV BLD AUTO: 7.6 FL (ref 5–10.5)
POTASSIUM SERPL-SCNC: 4.3 MMOL/L (ref 3.5–5.1)
PRO-BNP: 305 PG/ML (ref 0–449)
PROTEIN UA: NEGATIVE MG/DL
RBC # BLD: 4.21 M/UL (ref 4–5.2)
RBC UA: ABNORMAL /HPF (ref 0–4)
SODIUM BLD-SCNC: 140 MMOL/L (ref 136–145)
SPECIFIC GRAVITY UA: 1.02 (ref 1–1.03)
TOTAL PROTEIN: 7.1 G/DL (ref 6.4–8.2)
TROPONIN: 0.07 NG/ML
URINE REFLEX TO CULTURE: ABNORMAL
URINE TYPE: ABNORMAL
UROBILINOGEN, URINE: 0.2 E.U./DL
WBC # BLD: 6.7 K/UL (ref 4–11)
WBC UA: ABNORMAL /HPF (ref 0–5)

## 2020-09-18 PROCEDURE — 2580000003 HC RX 258: Performed by: INTERNAL MEDICINE

## 2020-09-18 PROCEDURE — G0378 HOSPITAL OBSERVATION PER HR: HCPCS

## 2020-09-18 PROCEDURE — 81001 URINALYSIS AUTO W/SCOPE: CPT

## 2020-09-18 PROCEDURE — 83690 ASSAY OF LIPASE: CPT

## 2020-09-18 PROCEDURE — 6370000000 HC RX 637 (ALT 250 FOR IP): Performed by: INTERNAL MEDICINE

## 2020-09-18 PROCEDURE — 2580000003 HC RX 258: Performed by: NURSE PRACTITIONER

## 2020-09-18 PROCEDURE — 2700000000 HC OXYGEN THERAPY PER DAY

## 2020-09-18 PROCEDURE — 94761 N-INVAS EAR/PLS OXIMETRY MLT: CPT

## 2020-09-18 PROCEDURE — 83880 ASSAY OF NATRIURETIC PEPTIDE: CPT

## 2020-09-18 PROCEDURE — 99215 OFFICE O/P EST HI 40 MIN: CPT | Performed by: INTERNAL MEDICINE

## 2020-09-18 PROCEDURE — 80053 COMPREHEN METABOLIC PANEL: CPT

## 2020-09-18 PROCEDURE — 6360000002 HC RX W HCPCS: Performed by: INTERNAL MEDICINE

## 2020-09-18 PROCEDURE — 71046 X-RAY EXAM CHEST 2 VIEWS: CPT

## 2020-09-18 PROCEDURE — 99285 EMERGENCY DEPT VISIT HI MDM: CPT

## 2020-09-18 PROCEDURE — 93005 ELECTROCARDIOGRAM TRACING: CPT | Performed by: EMERGENCY MEDICINE

## 2020-09-18 PROCEDURE — 74176 CT ABD & PELVIS W/O CONTRAST: CPT

## 2020-09-18 PROCEDURE — 96374 THER/PROPH/DIAG INJ IV PUSH: CPT

## 2020-09-18 PROCEDURE — 85025 COMPLETE CBC W/AUTO DIFF WBC: CPT

## 2020-09-18 PROCEDURE — 83735 ASSAY OF MAGNESIUM: CPT

## 2020-09-18 PROCEDURE — 84484 ASSAY OF TROPONIN QUANT: CPT

## 2020-09-18 PROCEDURE — 94640 AIRWAY INHALATION TREATMENT: CPT

## 2020-09-18 PROCEDURE — 6360000002 HC RX W HCPCS: Performed by: NURSE PRACTITIONER

## 2020-09-18 RX ORDER — SODIUM CHLORIDE 0.9 % (FLUSH) 0.9 %
10 SYRINGE (ML) INJECTION EVERY 12 HOURS SCHEDULED
Status: DISCONTINUED | OUTPATIENT
Start: 2020-09-18 | End: 2020-09-19 | Stop reason: HOSPADM

## 2020-09-18 RX ORDER — ALPRAZOLAM 0.25 MG/1
0.25 TABLET ORAL 3 TIMES DAILY PRN
Status: DISCONTINUED | OUTPATIENT
Start: 2020-09-18 | End: 2020-09-19

## 2020-09-18 RX ORDER — ALBUTEROL SULFATE 2.5 MG/3ML
2.5 SOLUTION RESPIRATORY (INHALATION) EVERY 6 HOURS PRN
Status: DISCONTINUED | OUTPATIENT
Start: 2020-09-18 | End: 2020-09-19 | Stop reason: HOSPADM

## 2020-09-18 RX ORDER — ONDANSETRON 2 MG/ML
4 INJECTION INTRAMUSCULAR; INTRAVENOUS EVERY 30 MIN PRN
Status: DISCONTINUED | OUTPATIENT
Start: 2020-09-18 | End: 2020-09-18 | Stop reason: ALTCHOICE

## 2020-09-18 RX ORDER — SODIUM CHLORIDE, SODIUM LACTATE, POTASSIUM CHLORIDE, CALCIUM CHLORIDE 600; 310; 30; 20 MG/100ML; MG/100ML; MG/100ML; MG/100ML
1000 INJECTION, SOLUTION INTRAVENOUS ONCE
Status: COMPLETED | OUTPATIENT
Start: 2020-09-18 | End: 2020-09-18

## 2020-09-18 RX ORDER — SODIUM CHLORIDE 0.9 % (FLUSH) 0.9 %
10 SYRINGE (ML) INJECTION PRN
Status: DISCONTINUED | OUTPATIENT
Start: 2020-09-18 | End: 2020-09-19 | Stop reason: HOSPADM

## 2020-09-18 RX ORDER — ACETAMINOPHEN 650 MG/1
650 SUPPOSITORY RECTAL EVERY 6 HOURS PRN
Status: DISCONTINUED | OUTPATIENT
Start: 2020-09-18 | End: 2020-09-19 | Stop reason: HOSPADM

## 2020-09-18 RX ORDER — LOSARTAN POTASSIUM 25 MG/1
25 TABLET ORAL DAILY
Status: DISCONTINUED | OUTPATIENT
Start: 2020-09-18 | End: 2020-09-18

## 2020-09-18 RX ORDER — ACETAMINOPHEN 325 MG/1
650 TABLET ORAL EVERY 6 HOURS PRN
Status: DISCONTINUED | OUTPATIENT
Start: 2020-09-18 | End: 2020-09-19 | Stop reason: HOSPADM

## 2020-09-18 RX ORDER — POLYETHYLENE GLYCOL 3350 17 G/17G
17 POWDER, FOR SOLUTION ORAL DAILY PRN
Status: DISCONTINUED | OUTPATIENT
Start: 2020-09-18 | End: 2020-09-19 | Stop reason: HOSPADM

## 2020-09-18 RX ORDER — ONDANSETRON 2 MG/ML
4 INJECTION INTRAMUSCULAR; INTRAVENOUS EVERY 6 HOURS PRN
Status: DISCONTINUED | OUTPATIENT
Start: 2020-09-18 | End: 2020-09-18

## 2020-09-18 RX ORDER — ATORVASTATIN CALCIUM 10 MG/1
20 TABLET, FILM COATED ORAL NIGHTLY
Status: DISCONTINUED | OUTPATIENT
Start: 2020-09-18 | End: 2020-09-19 | Stop reason: HOSPADM

## 2020-09-18 RX ORDER — PROMETHAZINE HYDROCHLORIDE 25 MG/1
12.5 TABLET ORAL EVERY 6 HOURS PRN
Status: DISCONTINUED | OUTPATIENT
Start: 2020-09-18 | End: 2020-09-19 | Stop reason: HOSPADM

## 2020-09-18 RX ORDER — CLOPIDOGREL BISULFATE 75 MG/1
75 TABLET ORAL DAILY
Status: DISCONTINUED | OUTPATIENT
Start: 2020-09-18 | End: 2020-09-19 | Stop reason: HOSPADM

## 2020-09-18 RX ORDER — BUDESONIDE 0.5 MG/2ML
0.5 INHALANT ORAL 2 TIMES DAILY
Status: DISCONTINUED | OUTPATIENT
Start: 2020-09-18 | End: 2020-09-19 | Stop reason: HOSPADM

## 2020-09-18 RX ADMIN — ONDANSETRON 4 MG: 2 INJECTION INTRAMUSCULAR; INTRAVENOUS at 11:47

## 2020-09-18 RX ADMIN — CLOPIDOGREL BISULFATE 75 MG: 75 TABLET ORAL at 18:19

## 2020-09-18 RX ADMIN — METOPROLOL TARTRATE 25 MG: 25 TABLET, FILM COATED ORAL at 20:30

## 2020-09-18 RX ADMIN — Medication 10 ML: at 20:30

## 2020-09-18 RX ADMIN — SERTRALINE HYDROCHLORIDE 50 MG: 50 TABLET ORAL at 18:19

## 2020-09-18 RX ADMIN — ATORVASTATIN CALCIUM 20 MG: 10 TABLET, FILM COATED ORAL at 20:30

## 2020-09-18 RX ADMIN — BUDESONIDE 500 MCG: 0.5 SUSPENSION RESPIRATORY (INHALATION) at 19:07

## 2020-09-18 RX ADMIN — SODIUM CHLORIDE, POTASSIUM CHLORIDE, SODIUM LACTATE AND CALCIUM CHLORIDE 1000 ML: 600; 310; 30; 20 INJECTION, SOLUTION INTRAVENOUS at 11:47

## 2020-09-18 RX ADMIN — ALPRAZOLAM 0.25 MG: 0.25 TABLET ORAL at 20:30

## 2020-09-18 RX ADMIN — PROMETHAZINE HYDROCHLORIDE 12.5 MG: 25 TABLET ORAL at 20:30

## 2020-09-18 ASSESSMENT — ENCOUNTER SYMPTOMS
BACK PAIN: 0
NAUSEA: 1
WHEEZING: 0
VOMITING: 0
COLOR CHANGE: 0
SHORTNESS OF BREATH: 0
DIARRHEA: 0
COUGH: 0
ABDOMINAL PAIN: 0

## 2020-09-18 ASSESSMENT — PAIN SCALES - GENERAL: PAINLEVEL_OUTOF10: 0

## 2020-09-18 NOTE — ED NOTES
1420 - called Noelle at [address] [facility] for chest pain, elevated troponin  1422 - Dr Neftali De La O called back to speak with NP Mando Ocasio  09/18/20 1427

## 2020-09-18 NOTE — ED PROVIDER NOTES
**ADVANCED PRACTICE PROVIDER, I HAVE EVALUATED THIS PATIENT**        [address]      Pt Name: Lahoma Dancer  RN  [place] 1940  Date of evaluation: 2020  Provider: CLIFTON Alvarez CNP      Chief Complaint:    Chief Complaint   Patient presents with    Chest Pain     started with n/v yesterday. c/o right sided cp today. Nursing Notes, Past Medical Hx, Past Surgical Hx, Social Hx, Allergies, and Family Hx were all reviewed and agreed with or any disagreements were addressed in the HPI.    HPI:  (Location, Duration, Timing, Severity, Quality, Assoc Sx, Context, Modifying factors)  This is a  79 y.o. female who presents today with upset stomach that began yesterday afternoon, her son thought it was because she ate a steak hoagie too fast, she vomited once and her stomach feels upset. She states that her stomach feels upset and \"icky\" feels nauseated, but no vomiting and no diarrhea today. She states that while she was doing towels for laundry she started with pain in the right side of her chest and has had a MI in the past. She states that her stomach still feels upset, still having right sided chest discomfort with nausea, but no vomiting or diarrhea today. He denies any urinary symptoms, no pain on exam, has not tried any medications. She denies any cough, congestion, fever or chills, no chest pain pleuritic chest pain or shortness of breath. No urinary symptoms. She denies any additional complaints, no additional aggravating or alleviating factors. Patient presents awake, alert and in no acute distress or toxic appearance.     PastMedical/Surgical History:      Diagnosis Date    [facility]JA [facility]acute [facility]) ([facility] [address]     Asthma     COPD (chronic obstructive pulmonary disease) (HCC)     Hyperlipidemia     Hypertension     MRSA (methicillin resistant staph aureus) culture positive 2019    + resp cx    OA (osteoarthritis) 8/12/2014         Procedure Laterality Date    BRONCHOSCOPY  09/08/2019    Dr. Eileen Noel - w/BAL   [address]  09/05/2019    Dr. Sidney Benites N/A 2/24/2020    COLONOSCOPY DIAGNOSTIC performed by Jinny Hardin DO at [address] N/A 9/19/2019    OFF PUMP CORONARY ARTERY BYPASS GRAFTING X2, INTERNAL MAMMARY ARTERY, SAPHENOUS VEIN GRAFT performed by Lilina Arias MD at [address], PARTIAL Left     SIGMOIDOSCOPY  02/27/2020    4 bands    SIGMOIDOSCOPY N/A 2/27/2020    FLEX SIG W/ BANDING SIGMOIDOSCOPY DIAGNOSTIC FLEXIBLE performed by Jinny Hardin DO at [address]       Medications:  Current Discharge Medication List      CONTINUE these medications which have NOT CHANGED    Details   acetaminophen (TYLENOL) 500 MG tablet Take 500 mg by mouth every 6 hours as needed for Pain      sertraline (ZOLOFT) 50 MG tablet Take 50 mg by mouth daily      ALPRAZolam (XANAX) 0.25 MG tablet Take 0.25 mg by mouth 3 times daily as needed for Sleep.       budesonide (PULMICORT) 0.5 MG/2ML nebulizer suspension Take 2 mLs by nebulization 2 times daily  Qty: 60 ampule, Refills: 3      ipratropium-albuterol (DUONEB) 0.5-2.5 (3) MG/3ML SOLN nebulizer solution Inhale 3 mLs into the lungs every 6 hours  Qty: 360 mL, Refills: 0      atorvastatin (LIPITOR) 20 MG tablet Take 1 tablet by mouth nightly  Qty: 30 tablet, Refills: 3      metoprolol tartrate (LOPRESSOR) 25 MG tablet Take 1 tablet by mouth 2 times daily  Qty: 60 tablet, Refills: 3      docusate sodium (COLACE, DULCOLAX) 100 MG CAPS Take 100 mg by mouth 2 times daily  Qty: 30 capsule, Refills: 0      famotidine (PEPCID) 20 MG tablet Take 1 tablet by mouth daily  Qty: 60 tablet, Refills: 3      losartan (COZAAR) 25 MG tablet Take 25 mg by mouth daily      clopidogrel (PLAVIX) 75 MG tablet Take 1 tablet by mouth daily  Qty: 30 tablet, Refills: 3      tiotropium (SPIRIVA HANDIHALER) 18 MCG inhalation capsule Inhale 18 mcg into the lungs daily      budesonide-formoterol (SYMBICORT) 160-4.5 MCG/ACT AERO Inhale 2 puffs into the lungs 2 times daily      albuterol (PROVENTIL HFA) 108 (90 BASE) MCG/ACT inhaler Inhale 2 puffs into the lungs every 6 hours as needed for Wheezing or Shortness of Breath. Qty: 1 Inhaler, Refills: 2               Review of Systems:  Review of Systems   Constitutional: Negative for chills and fever. HENT: Negative for congestion. Respiratory: Negative for cough, shortness of breath and wheezing. Cardiovascular: Negative for chest pain. Gastrointestinal: Positive for nausea. Negative for abdominal pain, diarrhea and vomiting. Patient complains of upset stomach that began yesterday afternoon after eating a steak okay, she states she had nausea and vomited once. However has had no vomiting or diarrhea today. Genitourinary: Negative for difficulty urinating, dysuria and frequency. Musculoskeletal: Negative for back pain. Skin: Negative for color change. Neurological: Negative for weakness, numbness and headaches. Positives and Pertinent negatives as per HPI. Except as noted above in the ROS, problem specific ROS was completed and is negative. Physical Exam:  Physical Exam  Vitals signs and nursing note reviewed. Constitutional:       Appearance: She is well-developed. She is not diaphoretic. HENT:      Head: Normocephalic. Right Ear: External ear normal.      Left Ear: External ear normal.   Eyes:      General:         Right eye: No discharge. Left eye: No discharge. Neck:      Musculoskeletal: Normal range of motion and neck supple. Cardiovascular:      Rate and Rhythm: Bradycardia present.       Comments: Normal S1 and 2, peripheral pulses are 2+, patient is sinus bradycardia on the monitor at 55 to 58 bpm during my bedside exam  Pulmonary:      Effort: Pulmonary effort is normal. No respiratory distress. Abdominal:      Palpations: Abdomen is soft. Tenderness: There is abdominal tenderness. Comments: Abdomen is generally flat and soft, mild tenderness noted in the epigastric region but no acute ascites or rigidity. No rebound tenderness at McBurney's point. Musculoskeletal: Normal range of motion. Skin:     General: Skin is warm. Capillary Refill: Capillary refill takes less than 2 seconds. Coloration: Skin is not pale. Neurological:      General: No focal deficit present. Mental Status: She is alert and oriented to person, place, and time. GCS: GCS eye subscore is 4. GCS verbal subscore is 5. GCS motor subscore is 6.    Psychiatric:         Mood and Affect: Mood normal.         Behavior: Behavior normal.         MEDICAL DECISION MAKING    Vitals:    Vitals:    09/18/20 1228 09/18/20 1318 09/18/20 1446 09/18/20 1638   BP: 131/67 134/64 135/66    Pulse: 55 55 55    Resp: 21 21 23    Temp:       TempSrc:       SpO2: 96% 100% 100% 100%   Weight:       Height:           LABS:  Labs Reviewed   CBC WITH AUTO DIFFERENTIAL - Abnormal; Notable for the following components:       Result Value    RDW 17.3 (*)     All other components within normal limits    Narrative:     Performed at:  [place] [address] [employer]   Phone [phone]   COMPREHENSIVE METABOLIC PANEL - Abnormal; Notable for the following components:    BUN 37 (*)     CREATININE 1.6 (*)     GFR Non- 31 (*)     GFR  38 (*)     ALT 9 (*)     AST 14 (*)     All other components within normal limits    Narrative:     Performed at:  [place] [address] [employer]   Phone [phone]   TROPONIN - Abnormal; Notable for the following components:    Troponin 0.07 (*)     All other components within normal limits    Narrative:     Performed at:  [facility]                MEDICAL DECISION MAKING / ED COURSE:    Because of high probability of sudden clinical deterioration of the patient's condition and risk of further deterioration, critical care time required my full attention to the patient's condition; which included chart data review, documentation, medication ordering, reviewing the patient's old records, reevaluation patient's cardiac, pulmonary and neurological status. Reevaluation of vital signs. Consultations with ED attending and admitting physician. Ordering, interpreting reviewing diagnostic testing. Therefore a critical care time was 35 minutes of direct attention to the patient's condition did not include time spent on procedures.     PROCEDURES:   Procedures    None    Patient was given:  Medications   albuterol (PROVENTIL) nebulizer solution 2.5 mg (has no administration in time range)   ALPRAZolam (XANAX) tablet 0.25 mg (has no administration in time range)   atorvastatin (LIPITOR) tablet 20 mg (has no administration in time range)   budesonide (PULMICORT) nebulizer suspension 500 mcg (has no administration in time range)   clopidogrel (PLAVIX) tablet 75 mg (has no administration in time range)   losartan (COZAAR) tablet 25 mg (has no administration in time range)   metoprolol tartrate (LOPRESSOR) tablet 25 mg (has no administration in time range)   sertraline (ZOLOFT) tablet 50 mg (has no administration in time range)   tiotropium (SPIRIVA RESPIMAT) 2.5 MCG/ACT inhaler 2 puff ( Inhalation Canceled Entry 9/18/20 1835)   sodium chloride flush 0.9 % injection 10 mL (has no administration in time range)   sodium chloride flush 0.9 % injection 10 mL (has no administration in time range)   acetaminophen (TYLENOL) tablet 650 mg (has no administration in time range)     Or   acetaminophen (TYLENOL) suppository 650 mg (has no administration in time range)   polyethylene glycol (GLYCOLAX) packet 17 g (has no administration in time range) promethazine (PHENERGAN) tablet 12.5 mg (has no administration in time range)     Or   ondansetron (ZOFRAN) injection 4 mg (has no administration in time range)   enoxaparin (LOVENOX) injection 30 mg (has no administration in time range)   lactated ringers infusion 1,000 mL (0 mLs Intravenous Stopped 9/18/20 1247)     Patient presents with upset stomach that began yesterday afternoon, her son thought it was because she ate a steak hoagie too fast, she vomited once and her stomach feels upset. She states that her stomach feels upset and \"icky\" feels nauseated, but no vomiting and no diarrhea today. She states that while she was doing towels for laundry she started with pain in the right side of her chest and has had a MI in the past.     After evaluation and examination patient I ordered IV access, blood work, chest x-ray, EKG and antiemetics. Urinalysis shows no acute infection. CBC shows no sepsis or anemia. Metabolic panel shows consistency with her renal insufficiency versus acute on chronic failure, her creatinine today is slightly worse at 1.6, GFR of 31 and BUN of 37, however this does compared to previous labs over the past year however, today her creatinine is worse, this could be some underlying dehydration as well. This also could be related to her troponin being elevated at 0.07 however it has been elevated before when she was recently admitted in September for her history of coronary artery disease. Liver functions are normal.  Lipase is 107. BNP is 305. The level is 2. Chest x-ray shows no acute cardiopulmonary findings. EKG shows sinus bradycardia rate of 83 bpm, no significant ST elevation, please see Dr. Belgica Best EKG interpretation note. CT scan of the chest abdomen pelvis shows no acute abnormality demonstrated, no evidence for abnormal mass or inflammatory processes. I did evaluate the patient chart, it appears she had a recent cardiac cath.   IMPRESSION:  1.  LV dysfunction with estimated ejection fraction of 35 to 40% with inferior hypo to akinesis. 2.  Coronary artery disease as described above including ostial left main disease (with catheter dampening upon engagement) and mid LAD disease. 3.  Recommend surgical consultation. 4.  Successful Angio-Seal of right femoral arteriotomy site. 5.  Estimated blood loss less than 5-10cc. However, because of her symptoms I ordered cardiology on call consultation. At [phone] I spoke with Dr Feroz Valles, cardiologist on-call, we discussed the patient's case at length and he requested patient be admitted to hospital.  Hospitalist paged for admission. At 4330 I spoke with Dr. Willia Boxer, hospitalist on-call, we discussed the patient's case at length and he agreed to set the patient for admission. The patient tolerated their visit well. I evaluated the patient. The physician was available for consultation as needed. The patient and / or the family were informed of the results of any tests, a time was given to answer questions, a plan was proposed and they agreed with plan. Patient will be admitted to hospital for further evaluation management of care. CLINICAL IMPRESSION:  1. Right-sided chest pain    2. History of coronary artery disease    3.  Nausea without vomiting        DISPOSITION Admitted 09/18/2020 02:54:16 PM      PATIENT REFERRED TO:  Jeremie Lew MD  [address] [place] [address]  [phone]            DISCHARGE MEDICATIONS:  Current Discharge Medication List          DISCONTINUED MEDICATIONS:  Current Discharge Medication List                 (Please note the MDM and HPI sections of this note were completed with a voice recognition program.  Efforts were made to edit the dictations but occasionally words are mis-transcribed.)    Electronically signed, CLIFTON Barnes CNP,           CLIFTON Barnes CNP  09/18/20 2613

## 2020-09-18 NOTE — ED NOTES
Bed: 07  Expected date:   Expected time:   Means of arrival:   Comments:  Pritesh, [facility]  09/18/20 0940

## 2020-09-18 NOTE — PROGRESS NOTES
RESPIRATORY THERAPY ASSESSMENT    Name:  Federico Nazario  Medical Record Number:  [id number]  Age: 79 y.o. Gender: female  : 1940  Today's Date:  2020  Room:  0201/0201-01    Assessment     Is the patient being admitted for a COPD or Asthma exacerbation? No   (If yes the patient will be seen every 4 hours for the first 24 hours and then reassessed)    Patient Admission Diagnosis      Allergies  Allergies   Allergen Reactions    Latex      Burning      Codeine      \"hallucinations\"       Minimum Predicted Vital Capacity:     NA          Actual Vital Capacity:      NA              Pulmonary History:COPD  Home Oxygen Therapy:  2 liters/min via nasal cannula  Home Respiratory Therapy:Albuterol, Albuterol/Ipratropium Bromide HHN, Budesonide, Budesonide/Formoterol  and Tiotropium Bromide   Current Respiratory Therapy:  Spiriva daily, Albuterol nebs Q6H prn, and Pulmicort BID          Respiratory Severity Index(RSI)   Patients with orders for inhalation medications, oxygen, or any therapeutic treatment modality will be placed on Respiratory Protocol. They will be assessed with the first treatment and at least every 72 hours thereafter. The following severity scale will be used to determine frequency of treatment intervention.     Smoking History: Pulmonary Disease or Smoking History, Greater than 15 pack year = 2    Social History  Social History     Tobacco Use    Smoking status: Former Smoker     Packs/day: 0.50     Years: 50.00     Pack years: 25.00     Types: Cigarettes     Last attempt to quit: 2019     Years since quittin.0    Smokeless tobacco: Never Used    Tobacco comment: advised to quit   Substance Use Topics    Alcohol use: No    Drug use: No       Recent Surgical History: None = 0  Past Surgical History  Past Surgical History:   Procedure Laterality Date    BRONCHOSCOPY  2019    Dr. Vinayak Sherwood - w/ALIYA   [address]  2019    Dr. Shanel Harrison SECTION      COLONOSCOPY N/A 2/24/2020    COLONOSCOPY DIAGNOSTIC performed by Lorie Grullon DO at [address] N/A 9/19/2019    OFF PUMP CORONARY ARTERY BYPASS GRAFTING X2, INTERNAL MAMMARY ARTERY, SAPHENOUS VEIN GRAFT performed by Jovani Elizalde MD at [address] , PARTIAL Left     SIGMOIDOSCOPY  02/27/2020    4 bands    SIGMOIDOSCOPY N/A 2/27/2020    FLEX SIG W/ BANDING SIGMOIDOSCOPY DIAGNOSTIC FLEXIBLE performed by Lorie Grullon DO at [address]       Level of Consciousness: Alert, Oriented, and Cooperative = 0    Level of Activity: Mostly sedentary, minimal walking = 2    Respiratory Pattern: Regular Pattern; RR 8-20 = 0    Breath Sounds: Diminshed bilaterally and/or crackles = 2    Sputum   ,  ,    Cough: Strong, spontaneous, non-productive = 0    Vital Signs   /66   Pulse 55   Temp 97.9 °F (36.6 °C) (Oral)   Resp 23   Ht 5' 4\" (1.626 m)   Wt 110 lb (49.9 kg)   SpO2 100%   BMI 18.88 kg/m²   SPO2 (COPD values may differ): 90-91% on room air or greater than 92% on FiO2 24- 28% = 1    Peak Flow (asthma only): not applicable = 0    RSI: 7-8 = BID and Q4HPRN (every four hours as needed) for dyspnea        Plan       Goals: medication delivery and improve oxygenation    Patient/caregiver was educated on the proper method of use for Respiratory Care Devices:  Yes      Level of patient/caregiver understanding able to:   ? Verbalize understanding   ? Demonstrate understanding       ? Teach back        ? Needs reinforcement       ? No available caregiver               ? Other:     Response to education:  Good     Is patient being placed on Home Treatment Regimen? Yes     Does the patient have everything they need prior to discharge? NA     Comments: chart reviewed and pt assessed    Plan of Care: home regimen    Electronically signed by Mary Berg RCP on 9/18/2020 at 4:38 PM    Respiratory Protocol Guidelines     1.  Assessment needed treatment for 72  Hrs. Patients Ordered on a Mucolytic Agent:    1. Must always be administered with a bronchodilator. 2. Discontinue if patient experiences worsened bronchospasm, or secretions have lessened to the point that the patient is able to clear them with a cough. Anti-inflammatory and Combination Medications:    1. If the patient lacks prior history of lung disease, is not using inhaled anti-inflammatory medication at home, and lacks wheezing by examination or by history for at least 24 hours, contact physician for possible discontinuation.

## 2020-09-18 NOTE — ED NOTES

## 2020-09-18 NOTE — ED PROVIDER NOTES
EKG: Sinus bradycardia rate of 53 bpm with left axis deviation and septal/inferior Q waves. No ST elevation.   Similar to prior from 2/23/2020      Chandler Zheng MD  09/18/20 0999

## 2020-09-18 NOTE — H&P
Hospital Medicine History & Physical      PCP: Christiano Espitia MD    Date of Admission: 9/18/2020    Date of Service: Pt seen/examined on 09/18/20 and Placed in Observation. Chief Complaint:  Chest pain      History Of Present Illness:   79 y.o. female who presented to [facility] with chest pain. Pain is right sided and radiates toward her back. Pain started yesterday after a meal. She has a prior history of a CABG in 2019 but has never had symptoms like this before. She has had intermittent nausea since the symptoms began. She denies fevers, chills, SOB, or diarrhea. Past Medical History:          Diagnosis Date    [facility]) ([facility] [address]     Asthma     COPD (chronic obstructive pulmonary disease) ([facility] [address]     Hyperlipidemia     Hypertension     MRSA (methicillin resistant staph aureus) culture positive 09/22/2019    + resp cx    OA (osteoarthritis) 8/12/2014       Past Surgical History:          Procedure Laterality Date    BRONCHOSCOPY  09/08/2019    Dr. Tammy Pinto - w/BAL   [address]  09/05/2019    Dr. Kehinde Au 2/24/2020    COLONOSCOPY DIAGNOSTIC performed by Denice Ansari DO at [address] N/A 9/19/2019    OFF PUMP CORONARY ARTERY BYPASS GRAFTING X2, INTERNAL MAMMARY ARTERY, SAPHENOUS VEIN GRAFT performed by Natividad Clarke MD at [address], PARTIAL Left     SIGMOIDOSCOPY  02/27/2020    4 bands    SIGMOIDOSCOPY N/A 2/27/2020    FLEX SIG W/ BANDING SIGMOIDOSCOPY DIAGNOSTIC FLEXIBLE performed by Denice Ansari DO at [address]       Medications Prior to Admission:      Prior to Admission medications    Medication Sig Start Date End Date Taking?  Authorizing Provider   acetaminophen (TYLENOL) 500 MG tablet Take 500 mg by mouth every 6 hours as needed for Pain    Historical Provider, MD   sertraline (ZOLOFT) 50 MG tablet Take 50 mg by mouth daily Historical Provider, MD   ALPRAZolam Sarthak Medici) 0.25 MG tablet Take 0.25 mg by mouth 3 times daily as needed for Sleep. Historical Provider, MD   budesonide (PULMICORT) 0.5 MG/2ML nebulizer suspension Take 2 mLs by nebulization 2 times daily 9/24/19   Brent Asencio MD   ipratropium-albuterol (DUONEB) 0.5-2.5 (3) MG/3ML SOLN nebulizer solution Inhale 3 mLs into the lungs every 6 hours 9/24/19   Brent Asencio MD   atorvastatin (LIPITOR) 20 MG tablet Take 1 tablet by mouth nightly 9/24/19   Brent Asencio MD   metoprolol tartrate (LOPRESSOR) 25 MG tablet Take 1 tablet by mouth 2 times daily 9/24/19   Brent Asencio MD   docusate sodium (COLACE, DULCOLAX) 100 MG CAPS Take 100 mg by mouth 2 times daily 9/24/19   Brent Asencio MD   famotidine (PEPCID) 20 MG tablet Take 1 tablet by mouth daily 9/25/19   Brent Asencio MD   losartan (COZAAR) 25 MG tablet Take 25 mg by mouth daily    Historical Provider, MD   clopidogrel (PLAVIX) 75 MG tablet Take 1 tablet by mouth daily 9/7/19   Edward Taveras MD   tiotropium (Latasha Vaca) 18 MCG inhalation capsule Inhale 18 mcg into the lungs daily 7/25/16   Historical Provider, MD   budesonide-formoterol (SYMBICORT) 160-4.5 MCG/ACT AERO Inhale 2 puffs into the lungs 2 times daily    Historical Provider, MD   albuterol (PROVENTIL HFA) 108 (90 BASE) MCG/ACT inhaler Inhale 2 puffs into the lungs every 6 hours as needed for Wheezing or Shortness of Breath. 12/20/12   Delfina Amaral MD       Allergies:  Latex and Codeine    Social History:      The patient currently lives at home    TOBACCO:   reports that she quit smoking about a year ago. Her smoking use included cigarettes. She has a 25.00 pack-year smoking history. She has never used smokeless tobacco.  ETOH:   reports no history of alcohol use. E-Cigarettes Vaping or Juuling     Questions Responses    Vaping Use Never Assessed    Start Date     Does device contain nicotine?  Never    Quit Date     Vaping Type             Family History:      Reviewed in detail and negative for DM. Positive as follows:        Problem Relation Age of Onset    Cancer Mother     Heart Disease Father     Stroke Father     Heart Disease Sister     Stroke Sister        REVIEW OF SYSTEMS:   Pertinent positives as noted in the HPI. All other systems reviewed and negative. PHYSICAL EXAM PERFORMED:    /66   Pulse 55   Temp 97.9 °F (36.6 °C) (Oral)   Resp 23   Ht 5' 4\" (1.626 m)   Wt 110 lb (49.9 kg)   SpO2 100%   BMI 18.88 kg/m²     General appearance:  No apparent distress, appears stated age and cooperative. HEENT:  Normal cephalic, atraumatic without obvious deformity. Pupils equal, round, and reactive to light. Extra ocular muscles intact. Conjunctivae/corneas clear. Neck: Supple, with full range of motion. No jugular venous distention. Trachea midline. Respiratory:  Normal respiratory effort. Clear to auscultation, bilaterally without Rales/Wheezes/Rhonchi. Cardiovascular:  Regular rate and rhythm with normal S1/S2 without murmurs, rubs or gallops. Abdomen: Soft, non-tender, non-distended with normal bowel sounds. Musculoskeletal:  No clubbing, cyanosis or edema bilaterally. Full range of motion without deformity. Skin: Skin color, texture, turgor normal.  No rashes or lesions. Neurologic:  Neurovascularly intact without any focal sensory/motor deficits. Cranial nerves: II-XII intact, grossly non-focal.  Psychiatric:  Alert and oriented, thought content appropriate, normal insight  Capillary Refill: Brisk,< 3 seconds   Peripheral Pulses: +2 palpable, equal bilaterally       Labs:     Recent Labs     09/18/20  1110   WBC 6.7   HGB 12.2   HCT 37.6        Recent Labs     09/18/20  1110      K 4.3      CO2 28   BUN 37*   CREATININE 1.6*   CALCIUM 10.1     Recent Labs     09/18/20  1110   AST 14*   ALT 9*   BILITOT <0.2   ALKPHOS 71     No results for input(s): INR in the last 72 hours.   Recent Labs     09/18/20  1110

## 2020-09-18 NOTE — CONSULTS
[facility]  [phone]      Attending Physician: Karlo Brewer MD  Reason for Consultation/Chief Complaint: Right-sided pain    Subjective   History of Present Illness:  Alicia Mariee is a 79 y.o. patient who presented to the hospital with complaints of right-sided pain that began yesterday evening after she ate a sandwich. She says it was mainly in her right chest but she did notice some nausea and shortness of breath as well. She says these are new symptoms and have not occurred previously. She does have a cardiac history which includes diagnosis of multivessel CAD and CABG x2 with LIMA to LAD and vein graft to diagonal with Dr. Tiffany Albright. Patient has followed up with Dr. Harman Sanford for her cardiac care and her last office visit was in 2020, she was felt to be stable with regard to CAD status post CABG, hypertension hypercholesterolemia on chronic prescribed medical therapy. In emergency room, work-up revealed elevated troponin of 0.07 and proBNP 305. Lipase was found to be elevated at 107. BUN and creatinine were 37 and 1.6. CT scan of the abdomen was done and showed no acute process. Past Medical History:   has a past medical history of NADJA (acute kidney injury) ([facility] [address] Asthma, COPD (chronic obstructive pulmonary disease) ([facility] [address] Hyperlipidemia, Hypertension, MRSA (methicillin resistant staph aureus) culture positive, and OA (osteoarthritis). Surgical History:   has a past surgical history that includes  section; Mastectomy, partial (Left); bronchoscopy (2019); Cardiac catheterization (2019); Coronary artery bypass graft (N/A, 2019); Colonoscopy (N/A, 2020); Sigmoidoscopy (2020); and sigmoidoscopy (N/A, 2020). Social History:   reports that she quit smoking about a year ago. Her smoking use included cigarettes. She has a 25.00 pack-year smoking history.  She has never used smokeless tobacco. She reports that she does not drink alcohol or use drugs. Family History:  family history includes Cancer in her mother; Heart Disease in her father and sister; Stroke in her father and sister. Home Medications:  Were reviewed and are listed in nursing record and/or below  Prior to Admission medications    Medication Sig Start Date End Date Taking? Authorizing Provider   acetaminophen (TYLENOL) 500 MG tablet Take 500 mg by mouth every 6 hours as needed for Pain    Historical Provider, MD   sertraline (ZOLOFT) 50 MG tablet Take 50 mg by mouth daily    Historical Provider, MD   ALPRAZolam (XANAX) 0.25 MG tablet Take 0.25 mg by mouth 3 times daily as needed for Sleep.     Historical Provider, MD   budesonide (PULMICORT) 0.5 MG/2ML nebulizer suspension Take 2 mLs by nebulization 2 times daily 9/24/19   Rayne Cao MD   ipratropium-albuterol (DUONEB) 0.5-2.5 (3) MG/3ML SOLN nebulizer solution Inhale 3 mLs into the lungs every 6 hours 9/24/19   Rayne Cao MD   atorvastatin (LIPITOR) 20 MG tablet Take 1 tablet by mouth nightly 9/24/19   Rayne Cao MD   metoprolol tartrate (LOPRESSOR) 25 MG tablet Take 1 tablet by mouth 2 times daily 9/24/19   Rayne Cao MD   docusate sodium (COLACE, DULCOLAX) 100 MG CAPS Take 100 mg by mouth 2 times daily 9/24/19   Rayne Cao MD   famotidine (PEPCID) 20 MG tablet Take 1 tablet by mouth daily 9/25/19   Rayne Cao MD   losartan (COZAAR) 25 MG tablet Take 25 mg by mouth daily    Historical Provider, MD   clopidogrel (PLAVIX) 75 MG tablet Take 1 tablet by mouth daily 9/7/19   Karena Chowdhury MD   tiotropium (Arthurine Fritz) 18 MCG inhalation capsule Inhale 18 mcg into the lungs daily 7/25/16   Historical Provider, MD   budesonide-formoterol (SYMBICORT) 160-4.5 MCG/ACT AERO Inhale 2 puffs into the lungs 2 times daily    Historical Provider, MD   albuterol (PROVENTIL HFA) 108 (90 BASE) MCG/ACT inhaler Inhale 2 puffs into the lungs every 6 hours as needed for Wheezing or Shortness of organomegaly   Extremities: Extremities normal, atraumatic, no cyanosis or edema   Pulses: 2+ and symmetric   Skin: Skin color, texture, turgor normal, no rashes or lesions   Pysch: Normal mood and affect   Neurologic: Normal gross motor and sensory exam.         Labs   CBC:   Lab Results   Component Value Date    WBC 6.7 2020    RBC 4.21 2020    HGB 12.2 2020    HCT 37.6 2020    MCV 89.3 2020    RDW 17.3 2020     2020     CMP:  Lab Results   Component Value Date     2020    K 4.3 2020    K 3.8 2020     2020    CO2 28 2020    BUN 37 2020    CREATININE 1.6 2020    GFRAA 38 2020    GFRAA >60 2013    AGRATIO 1.4 2020    LABGLOM 31 2020    GLUCOSE 95 2020    PROT 7.1 2020    PROT 7.81 2012    CALCIUM 10.1 2020    BILITOT <0.2 2020    ALKPHOS 71 2020    AST 14 2020    ALT 9 2020     PT/INR:  No results found for: PTINR  HgBA1c:  Lab Results   Component Value Date    LABA1C 5.8 2019     Lab Results   Component Value Date    CKTOTAL 96 2015    TROPONINI 0.07 (H) 2020         Cardiac Data     Last EKG:   Normal sinus rhythm, left axis, left anterior fascicular block, overall similar to prior EKG    Echo:  2019:    Conclusions      Summary   Left ventricular cavity size is normal. Normal left ventricular wall   thickness. Overall left ventricular systolic function appears low normal   with an estimated ejection fraction of 50%. No regional wall motion   abnormalities are noted. Diastolic function is indeterminate. Mild mitral regurgitation is present. The right ventricle is normal in size and function.     Stress Test:    Cath:    2019:                            CARDIAC CATHETERIZATION     PATIENT NAME: Marin Loepz                 :        1940  MED REC NO:   [id number] to  akinesis of the inferior wall. Estimated ejection fraction of 35 to  40%.     LEFT MAIN:  Left main does have ostial disease of approximately 70%. There is catheter dampening upon engagement of the left main.     LEFT ANTERIOR DESCENDING:  The LAD course to and wraps around the apex. Gives off a large diagonal branch with multiple terminal divisions. Just at the diagonal branch, it was free of significant obstructive  disease. The LAD does continue, and just after the large diagonal  branch, has 90% stenosis in the LAD proper.     LEFT CIRCUMFLEX:  Circumflex is small nondominant vessel. It consists  essentially of a single marginal branch. It is free of significant  obstructive disease.     RIGHT CORONARY ARTERY:  Right coronary artery is very large dominant  vessel. It gives off a large PDA and two large posterolateral branches. Right coronary artery is free of significant obstructive disease.     IMPRESSION:  1.  LV dysfunction with estimated ejection fraction of 35 to 40% with  inferior hypo to akinesis. 2.  Coronary artery disease as described above including ostial left  main disease (with catheter dampening upon engagement) and mid LAD  disease. 3.  Recommend surgical consultation. 4.  Successful Angio-Seal of right femoral arteriotomy site. 5.  Estimated blood loss less than 5-10cc.          Studies:     Chest x-ray:       Impression    No acute process.         COPD               I have reviewed labs and imaging/xray/diagnostic testing in this note.     Assessment and Plan          Patient Active Problem List   Diagnosis    Hyperlipidemia    Asthma    OA (osteoarthritis)    Tobacco use    COPD exacerbation ([address]    Sepsis ([address]    Abnormal chest x-ray    COPD with acute exacerbation ([address]    Shortness of breath    Tobacco abuse    Pneumonia due to organism    Moderate malnutrition ([address]    NSTEMI (non-ST elevated myocardial infarction) ([address]    Acute respiratory failure with

## 2020-09-18 NOTE — ED NOTES

## 2020-09-18 NOTE — ED NOTES
Pt report given face to face to [address]. Report given, questions answered, care transferred. Pt in NAD, RR even and unlabored.  Awaiting transport     Suzette Hilario RN  09/18/20 [id number]

## 2020-09-18 NOTE — ED NOTES
1435 - PS to hospitalists  Re:  Celia Whittaker for right-sided chest pain, history of coronary artery disease, nausea without vomiting  1446 - Dr Loretta Tubbs called back to speak with JANE Ocasio  09/18/20 1454

## 2020-09-18 NOTE — ED NOTES
Pt calm and resting quietly with equal rise and fall of chest. Pt in NAD, RR even and unlabored. Side rails up, bed locked and in lowest position. Pt updated on plan of care. No needs at this time. Pt instructed on use of call light if needed.       Alfredo Cabello RN  09/18/20 1856

## 2020-09-19 ENCOUNTER — APPOINTMENT (OUTPATIENT)
Dept: NUCLEAR MEDICINE | Age: 80
End: 2020-09-19
Payer: MEDICARE

## 2020-09-19 VITALS
BODY MASS INDEX: 18.78 KG/M2 | WEIGHT: 110 LBS | SYSTOLIC BLOOD PRESSURE: 148 MMHG | DIASTOLIC BLOOD PRESSURE: 69 MMHG | OXYGEN SATURATION: 100 % | RESPIRATION RATE: 16 BRPM | HEIGHT: 64 IN | HEART RATE: 72 BPM | TEMPERATURE: 97.6 F

## 2020-09-19 LAB
ANION GAP SERPL CALCULATED.3IONS-SCNC: 7 MMOL/L (ref 3–16)
BUN BLDV-MCNC: 29 MG/DL (ref 7–20)
CALCIUM SERPL-MCNC: 9.4 MG/DL (ref 8.3–10.6)
CHLORIDE BLD-SCNC: 104 MMOL/L (ref 99–110)
CO2: 29 MMOL/L (ref 21–32)
CREAT SERPL-MCNC: 1.3 MG/DL (ref 0.6–1.2)
EKG ATRIAL RATE: 53 BPM
EKG ATRIAL RATE: 67 BPM
EKG DIAGNOSIS: NORMAL
EKG DIAGNOSIS: NORMAL
EKG P AXIS: 79 DEGREES
EKG P AXIS: 81 DEGREES
EKG P-R INTERVAL: 154 MS
EKG P-R INTERVAL: 168 MS
EKG Q-T INTERVAL: 392 MS
EKG Q-T INTERVAL: 436 MS
EKG QRS DURATION: 86 MS
EKG QRS DURATION: 86 MS
EKG QTC CALCULATION (BAZETT): 409 MS
EKG QTC CALCULATION (BAZETT): 414 MS
EKG R AXIS: -62 DEGREES
EKG R AXIS: -76 DEGREES
EKG T AXIS: 70 DEGREES
EKG T AXIS: 82 DEGREES
EKG VENTRICULAR RATE: 53 BPM
EKG VENTRICULAR RATE: 67 BPM
GFR AFRICAN AMERICAN: 48
GFR NON-AFRICAN AMERICAN: 40
GLUCOSE BLD-MCNC: 86 MG/DL (ref 70–99)
HCT VFR BLD CALC: 36.9 % (ref 36–48)
HEMOGLOBIN: 11.9 G/DL (ref 12–16)
LV EF: 75 %
LVEF MODALITY: NORMAL
MCH RBC QN AUTO: 29.5 PG (ref 26–34)
MCHC RBC AUTO-ENTMCNC: 32.3 G/DL (ref 31–36)
MCV RBC AUTO: 91.1 FL (ref 80–100)
PDW BLD-RTO: 16.9 % (ref 12.4–15.4)
PLATELET # BLD: 214 K/UL (ref 135–450)
PMV BLD AUTO: 7.6 FL (ref 5–10.5)
POTASSIUM REFLEX MAGNESIUM: 4.8 MMOL/L (ref 3.5–5.1)
RBC # BLD: 4.05 M/UL (ref 4–5.2)
SODIUM BLD-SCNC: 140 MMOL/L (ref 136–145)
TROPONIN: 0.04 NG/ML
TROPONIN: 0.05 NG/ML
WBC # BLD: 5.1 K/UL (ref 4–11)

## 2020-09-19 PROCEDURE — 6360000002 HC RX W HCPCS: Performed by: INTERNAL MEDICINE

## 2020-09-19 PROCEDURE — 93005 ELECTROCARDIOGRAM TRACING: CPT | Performed by: INTERNAL MEDICINE

## 2020-09-19 PROCEDURE — G0378 HOSPITAL OBSERVATION PER HR: HCPCS

## 2020-09-19 PROCEDURE — 94640 AIRWAY INHALATION TREATMENT: CPT

## 2020-09-19 PROCEDURE — 93010 ELECTROCARDIOGRAM REPORT: CPT | Performed by: INTERNAL MEDICINE

## 2020-09-19 PROCEDURE — 6370000000 HC RX 637 (ALT 250 FOR IP): Performed by: INTERNAL MEDICINE

## 2020-09-19 PROCEDURE — A9502 TC99M TETROFOSMIN: HCPCS | Performed by: INTERNAL MEDICINE

## 2020-09-19 PROCEDURE — 99215 OFFICE O/P EST HI 40 MIN: CPT | Performed by: INTERNAL MEDICINE

## 2020-09-19 PROCEDURE — 80048 BASIC METABOLIC PNL TOTAL CA: CPT

## 2020-09-19 PROCEDURE — 96372 THER/PROPH/DIAG INJ SC/IM: CPT

## 2020-09-19 PROCEDURE — 94761 N-INVAS EAR/PLS OXIMETRY MLT: CPT

## 2020-09-19 PROCEDURE — 2700000000 HC OXYGEN THERAPY PER DAY

## 2020-09-19 PROCEDURE — 93017 CV STRESS TEST TRACING ONLY: CPT

## 2020-09-19 PROCEDURE — 3430000000 HC RX DIAGNOSTIC RADIOPHARMACEUTICAL: Performed by: INTERNAL MEDICINE

## 2020-09-19 PROCEDURE — 2580000003 HC RX 258: Performed by: INTERNAL MEDICINE

## 2020-09-19 PROCEDURE — 84484 ASSAY OF TROPONIN QUANT: CPT

## 2020-09-19 PROCEDURE — 85027 COMPLETE CBC AUTOMATED: CPT

## 2020-09-19 PROCEDURE — 36415 COLL VENOUS BLD VENIPUNCTURE: CPT

## 2020-09-19 PROCEDURE — 6370000000 HC RX 637 (ALT 250 FOR IP): Performed by: NURSE PRACTITIONER

## 2020-09-19 PROCEDURE — 78452 HT MUSCLE IMAGE SPECT MULT: CPT

## 2020-09-19 RX ORDER — AMINOPHYLLINE DIHYDRATE 25 MG/ML
25 INJECTION, SOLUTION INTRAVENOUS ONCE
Status: COMPLETED | OUTPATIENT
Start: 2020-09-19 | End: 2020-09-19

## 2020-09-19 RX ORDER — ALPRAZOLAM 0.25 MG/1
0.12 TABLET ORAL 3 TIMES DAILY PRN
Status: DISCONTINUED | OUTPATIENT
Start: 2020-09-19 | End: 2020-09-19 | Stop reason: HOSPADM

## 2020-09-19 RX ADMIN — TETROFOSMIN 10.3 MILLICURIE: 1.38 INJECTION, POWDER, LYOPHILIZED, FOR SOLUTION INTRAVENOUS at 08:55

## 2020-09-19 RX ADMIN — REGADENOSON 0.4 MG: 0.08 INJECTION, SOLUTION INTRAVENOUS at 10:15

## 2020-09-19 RX ADMIN — ENOXAPARIN SODIUM 30 MG: 30 INJECTION SUBCUTANEOUS at 11:55

## 2020-09-19 RX ADMIN — SERTRALINE HYDROCHLORIDE 50 MG: 50 TABLET ORAL at 11:55

## 2020-09-19 RX ADMIN — ALPRAZOLAM 0.12 MG: 0.25 TABLET ORAL at 11:54

## 2020-09-19 RX ADMIN — TETROFOSMIN 31.5 MILLICURIE: 1.38 INJECTION, POWDER, LYOPHILIZED, FOR SOLUTION INTRAVENOUS at 10:15

## 2020-09-19 RX ADMIN — Medication 10 ML: at 11:55

## 2020-09-19 RX ADMIN — CLOPIDOGREL BISULFATE 75 MG: 75 TABLET ORAL at 11:55

## 2020-09-19 RX ADMIN — TIOTROPIUM BROMIDE INHALATION SPRAY 2 PUFF: 3.12 SPRAY, METERED RESPIRATORY (INHALATION) at 11:43

## 2020-09-19 RX ADMIN — ACETAMINOPHEN 650 MG: 325 TABLET ORAL at 07:26

## 2020-09-19 RX ADMIN — BUDESONIDE 500 MCG: 0.5 SUSPENSION RESPIRATORY (INHALATION) at 11:43

## 2020-09-19 RX ADMIN — METOPROLOL TARTRATE 25 MG: 25 TABLET, FILM COATED ORAL at 11:55

## 2020-09-19 RX ADMIN — AMINOPHYLLINE 50 MG: 25 INJECTION, SOLUTION INTRAVENOUS at 10:31

## 2020-09-19 ASSESSMENT — PAIN SCALES - GENERAL: PAINLEVEL_OUTOF10: 3

## 2020-09-19 NOTE — ED PROVIDER NOTES
I independently performed a history and physical on SSEV. All diagnostic, treatment, and disposition decisions were made by myself in conjunction with the advanced practice provider. Briefly, this is a 79 y.o. female here for chest pain. Epigastric in location and radiating upwards. Thought it may be indigestion. Also worried about heart attack. Has been ongoing for weeks but worsening. Not associated with shortness of breath. Vomited once. Pain is also in right side of chest    On exam,   General: Patient is in no acute distress  Skin: No cyanosis  HEENT: Moist mucous membranes  Heart: Regular rate, regular rhythm  Lung: No respiratory distress. Clear to auscultation  Abdomen: Soft, nontender  Neuro: Moving all extremities, no facial droop, no slurred speech, answers questions appropriately        EKG  The Ekg interpreted by me in the absence of a cardiologist shows. Kvng sinus rhythm  No acute ST changes or T wave abnormalities indicative of acute ischemia      Screenings   Jennifer Coma Scale  Eye Opening: Spontaneous  Best Verbal Response: Oriented  Best Motor Response: Obeys commands  Jennifer Coma Scale Score: 15        MDM  Patient is a 67-year-old woman with history of coronary artery disease who presents with chest pain. Did have recent cardiac catheterization 1 month ago whereupon there was question of whether or not she should undergo CABG. Given her significant cardiac history, I do feel it prudent to admit patient for work-up of her chest pain. Does have elevated troponin but this seems to be around her baseline. Does have NADJA. Has elevated lipase which may also be the cause of her discomfort. Patient admitted to hospitalist service. Patient Referrals:  Bharti Dugan MD  [id number] [place] [address] [address] [place] [address]  [phone]            Discharge Medications:  Current Discharge Medication List          FINAL IMPRESSION  1. Right-sided chest pain    2. History of coronary artery disease    3. Nausea without vomiting        Blood pressure 122/73, pulse 79, temperature 97.9 °F (36.6 °C), temperature source Oral, resp. rate 16, height 5' 4\" (1.626 m), weight 110 lb (49.9 kg), SpO2 99 %, not currently breastfeeding. For further details of [facility] emergency department encounter, please see documentation by advanced practice providerChong.         Maria Dolores Nunez MD  09/18/20 5820

## 2020-09-19 NOTE — PROGRESS NOTES
CARDIOLOGY PROGRESS NOTE      Patient Name: Herb Park  Date of admission: 9/18/2020 11:01 AM  Admission Dx: Chest pain [R07.9]  Chest pain [R07.9]  Reason for Consult:  Chest pain  Requesting Physician: Clem Marcelo MD  Primary Care physician: Reginald Niño MD    Subjective:     Herb Park is a 79 y.o. patient with prior history notable for coronary artery disease with prior 2-v CABG 9/2019 (LIMA to LAD, SVG to Diagonal), hypertension, hyperlipidemia who presented to the hospital 9/18/20 with complaints of chest pain. Right sided chest discomfort the evening prior to admission following eating a sandwich with associated nausea and dyspnea. Troponin elevated at presentation. ECG NSR LAFB unchanged from prior. CT abdomen unrevealing. She reports today no chest pain overnight. Describes her pain as a feeling of a \"muscle pull\" to me that she first noted after putting some laundry into the machine day of admission. Baseline shortness of breath on O2 at rest. No nausea/vomiting. No palpitations, dizziness. No PND, orthopnea, or LE edema. Home Medications:  Were reviewed and are listed in nursing record and/or below  Prior to Admission medications    Medication Sig Start Date End Date Taking? Authorizing Provider   acetaminophen (TYLENOL) 500 MG tablet Take 500 mg by mouth every 6 hours as needed for Pain   Yes Historical Provider, MD   sertraline (ZOLOFT) 50 MG tablet Take 50 mg by mouth daily   Yes Historical Provider, MD   ALPRAZolam (XANAX) 0.25 MG tablet Take 0.25 mg by mouth 3 times daily as needed for Sleep.    Yes Historical Provider, MD   budesonide (PULMICORT) 0.5 MG/2ML nebulizer suspension Take 2 mLs by nebulization 2 times daily 9/24/19  Yes Rayne Cao MD   ipratropium-albuterol (DUONEB) 0.5-2.5 (3) MG/3ML SOLN nebulizer solution Inhale 3 mLs into the lungs every 6 hours 9/24/19  Yes Rayne Cao MD   atorvastatin (LIPITOR) 20 MG tablet Take 1 tablet by mouth nightly 9/24/19  Yes Bjorn Ennis MD   metoprolol tartrate (LOPRESSOR) 25 MG tablet Take 1 tablet by mouth 2 times daily 9/24/19  Yes Bjorn Ennis MD   docusate sodium (COLACE, DULCOLAX) 100 MG CAPS Take 100 mg by mouth 2 times daily 9/24/19  Yes Bjorn Ennis MD   losartan (COZAAR) 25 MG tablet Take 25 mg by mouth daily   Yes Historical Provider, MD   clopidogrel (PLAVIX) 75 MG tablet Take 1 tablet by mouth daily 9/7/19  Yes Monico James MD   tiotropium (Greg ) 18 MCG inhalation capsule Inhale 18 mcg into the lungs daily 7/25/16  Yes Historical Provider, MD   budesonide-formoterol (SYMBICORT) 160-4.5 MCG/ACT AERO Inhale 2 puffs into the lungs 2 times daily   Yes Historical Provider, MD   albuterol (PROVENTIL HFA) 108 (90 BASE) MCG/ACT inhaler Inhale 2 puffs into the lungs every 6 hours as needed for Wheezing or Shortness of Breath.  12/20/12  Yes Leo Mcghee MD   famotidine (PEPCID) 20 MG tablet Take 1 tablet by mouth daily 9/25/19   Yolanda Manzo MD        CURRENT Medications:  albuterol (PROVENTIL) nebulizer solution 2.5 mg, Q6H PRN  ALPRAZolam (XANAX) tablet 0.25 mg, TID PRN  atorvastatin (LIPITOR) tablet 20 mg, Nightly  budesonide (PULMICORT) nebulizer suspension 500 mcg, BID  clopidogrel (PLAVIX) tablet 75 mg, Daily  metoprolol tartrate (LOPRESSOR) tablet 25 mg, BID  sertraline (ZOLOFT) tablet 50 mg, Daily  tiotropium (SPIRIVA RESPIMAT) 2.5 MCG/ACT inhaler 2 puff, Daily  sodium chloride flush 0.9 % injection 10 mL, 2 times per day  sodium chloride flush 0.9 % injection 10 mL, PRN  acetaminophen (TYLENOL) tablet 650 mg, Q6H PRN    Or  acetaminophen (TYLENOL) suppository 650 mg, Q6H PRN  polyethylene glycol (GLYCOLAX) packet 17 g, Daily PRN  promethazine (PHENERGAN) tablet 12.5 mg, Q6H PRN  enoxaparin (LOVENOX) injection 30 mg, Daily  regadenoson (LEXISCAN) injection 0.4 mg, ONCE PRN  perflutren lipid microspheres (DEFINITY) injection 1.65 mg, ONCE PRN        Allergies:  Latex and Codeine Review of Systems:   A 14 point review of symptoms completed. Pertinent positives identified in the HPI, all other review of symptoms negative. Objective:     Vitals:    09/19/20 0500 09/19/20 0745 09/19/20 1145 09/19/20 1158   BP: (!) 143/73 (!) 138/58  (!) 148/69   Pulse: 64 63  72   Resp: 16 18  16   Temp: 97.6 °F (36.4 °C) 97.8 °F (36.6 °C)  97.6 °F (36.4 °C)   TempSrc: Axillary Oral  Oral   SpO2: 98% 100% 100% 100%   Weight:       Height:          Weight: 110 lb (49.9 kg)       PHYSICAL EXAM:    General:  Alert, cooperative, no distress, appears stated age   Head:  Normocephalic, atraumatic   Eyes:  Conjunctiva/corneas clear, anicteric sclerae    Nose: Nares normal, no drainage or sinus tenderness   Throat: No abnormalities of the lips, oral mucosa or tongue. Neck: Trachea midline. Neck supple with no lymphadenopathy, thyroid not enlarged, symmetric, no tenderness/mass/nodules, flat neck veins   Lungs:   Clear to auscultation bilaterally, no wheezes,faint rales at the bases, no respiratory distress   Chest Wall:  No deformity or tenderness to palpation. Well healed sternotomy scar. Heart:  Regular rate and rhythm, normal S1, normal S2, no murmur, no rub, no S3/S4, PMI non-displaced. Abdomen:   Soft, non-tender, with normoactive bowel sounds. No masses, no hepatosplenomegaly   Extremities: No cyanosis, clubbing or pitting edema. Vascular: 2+ radial, brachial, femoral, dorsalis pedis and posterior tibial pulses bilaterally. Brisk carotid upstrokes without carotid bruit. Skin: Skin color, texture, turgor are normal with no rashes or ulceration. Bruising bilateral LE. Pysch: Euthymic mood, appropriate affect   Neurologic: Oriented to person, place and time. No slurred speech or facial asymmetry. No motor or sensory deficits on gross examination.          Labs:   CBC:   Lab Results   Component Value Date    WBC 6.7 09/18/2020    RBC 4.21 09/18/2020    HGB 12.2 09/18/2020    HCT 37.6 09/18/2020    MCV 89.3 09/18/2020    RDW 17.3 09/18/2020     09/18/2020     CMP:  Lab Results   Component Value Date     09/18/2020    K 4.3 09/18/2020    K 3.8 02/25/2020     09/18/2020    CO2 28 09/18/2020    BUN 37 09/18/2020    CREATININE 1.6 09/18/2020    GFRAA 38 09/18/2020    GFRAA >60 05/08/2013    AGRATIO 1.4 09/18/2020    LABGLOM 31 09/18/2020    GLUCOSE 95 09/18/2020    PROT 7.1 09/18/2020    PROT 7.81 12/20/2012    CALCIUM 10.1 09/18/2020    BILITOT <0.2 09/18/2020    ALKPHOS 71 09/18/2020    AST 14 09/18/2020    ALT 9 09/18/2020     PT/INR:  No results found for: PTINR  HgBA1c:  Lab Results   Component Value Date    LABA1C 5.8 09/19/2019     Lab Results   Component Value Date    CKTOTAL 96 11/03/2015    TROPONINI 0.04 (H) 09/19/2020         Interval Testing/Data:   Stress test:  Lexiscan: performed today, personally reviewed. No inducible ischemia. TTE September 2019:   Summary   Left ventricular cavity size is normal. Normal left ventricular wall   thickness. Overall left ventricular systolic function appears low normal   with an estimated ejection fraction of 50%. No regional wall motion   abnormalities are noted. Diastolic function is indeterminate.   Mild mitral regurgitation is present.   The right ventricle is normal in size and function. [facility] 9/2019  LEFT MAIN:  Left main does have ostial disease of approximately 70%. There is catheter dampening upon engagement of the left main.     LEFT ANTERIOR DESCENDING:  The LAD course to and wraps around the apex. Gives off a large diagonal branch with multiple terminal divisions.   Just at the diagonal branch, it was free of significant obstructive disease.  The LAD does continue, and just after the large diagonal branch, has 90% stenosis in the LAD proper.     LEFT CIRCUMFLEX:  Circumflex is small nondominant vessel.  It consists  essentially of a single marginal branch.  It is free of significant  obstructive disease.     RIGHT CORONARY ARTERY:  Right coronary artery is very large dominant  vessel.  It gives off a large PDA and two large posterolateral branches. Right coronary artery is free of significant obstructive disease.     IMPRESSION:  1.  LV dysfunction with estimated ejection fraction of 35 to 40% with  inferior hypo to akinesis. 2.  Coronary artery disease as described above including ostial left main disease (with catheter dampening upon engagement) and mid LAD  disease. 3.  Recommend surgical consultation. 4.  Successful Angio-Seal of right femoral arteriotomy site. 5.  Estimated blood loss less than 5-10cc.        CXR personally reviewed. EKG personally reviewed. Impression and Plan      1. Chest pain, non-cardiac - stress test negative today   2. Elevated troponin - down-trended. In setting of hypertension, low clearance   3. Coronary artery disease s/p prior CABG  4. Hypertension - uncontrolled   5. Hyperlipidemia  6. Acute kidney injury - creatinine down-trending     Plan  1. Stress test negative for inducible ischemia. 2. Echo slated to be performed Monday. She can be re-evaluated within 1 week of discharge with consideration given to performing this on OP basis but I do not feel that this needs to be performed while here given the atypical nature of the pain. 3. Continue beta blocker,statin, plavix  3. Losartan held due to NADJA; Scr trending down. Restart when able for goal BP <130/80. Cardiology will sign off. Please do not hesitate to call with questions/concerns.        Patient Active Problem List   Diagnosis    Hyperlipidemia    Asthma    OA (osteoarthritis)    Tobacco use    COPD exacerbation (HCC)    Sepsis ([address]    Abnormal chest x-ray    COPD with acute exacerbation (HCC)    Shortness of breath    Tobacco abuse    Pneumonia due to organism    Moderate malnutrition ([address]    NSTEMI (non-ST elevated myocardial infarction) ([address]    Acute respiratory failure with hypoxia ([address]    CAD (coronary artery disease)    Acute pulmonary edema (HCC)    Pulmonary infiltrate    Elevated brain natriuretic peptide (BNP) level    Leukocytosis    Ischemic cardiomyopathy    Acute combined systolic and diastolic congestive heart failure (HCC)    Hypernatremia    [facility]JA [facility]) ([facility] [facility]    Status post aorto-coronary artery bypass graft    Mucus plugging of bronchi    CAD in native artery    S/P CABG (coronary artery bypass graft)    Pneumonia of both lower lobes due to methicillin resistant Staphylococcus aureus (MRSA) ([facility] [address]    GI bleed    Severe malnutrition ([facility] [address]    Chest pain           I will address the patient's cardiac risk factors and adjusted pharmacologic treatment as needed. In addition, I have reinforced the need for patient directed risk factor modification. All questions and concerns were addressed to the patient/family. Alternatives to my treatment were discussed. Thank you for allowing us to participate in the care of [facility]. Please call me with any questions [phone].     Rishi Meade MD   Cardiovascular Disease  [facility]  [phone] [facility]  [phone] [address] [place] [address]  9/19/2020 6:26 AM

## 2020-09-19 NOTE — PLAN OF CARE
Problem: OXYGENATION/RESPIRATORY FUNCTION  Goal: Patient will maintain patent airway  Outcome: Ongoing     Problem: FLUID AND ELECTROLYTE IMBALANCE  Goal: Fluid and electrolyte balance are achieved/maintained  Outcome: Ongoing     Patient's EF (Ejection Fraction) is greater than 40%    Heart Failure Medications:  Diuretics[de-identified] None    (One of the following REQUIRED for EF <40%/SYSTOLIC FAILURE but MAY be used in EF% >40%/DIASTOLIC FAILURE)        ACE[de-identified] None        ARB[de-identified] None         ARNI[de-identified] None    (Beta Blockers)  NON- Evidenced Based Beta Blocker (for EF% >40%/DIASTOLIC FAILURE): None    Evidenced Based Beta Blocker::(REQUIRED for EF% <40%/SYSTOLIC FAILURE) None  . .................................................................................................................................................. Patient's weights and intake/output reviewed: Yes    Patient's Last Weight: 110 lbs obtained by  stated weight on admission . Intake/Output Summary (Last 24 hours) at 9/19/2020 1942  Last data filed at 9/19/2020 0745  Gross per 24 hour   Intake 0 ml   Output 700 ml   Net -700 ml       Comorbidities Reviewed Yes    Patient has a past medical history of NADJA (acute kidney injury) ([facility] [address] Asthma, COPD (chronic obstructive pulmonary disease) ([facility] [address] Hyperlipidemia, Hypertension, MRSA (methicillin resistant staph aureus) culture positive, and OA (osteoarthritis). >>For CHF and Comorbidity documentation on Education Time and Topics, please see Education Tab    Progressive Mobility Assessment:  What is this patient's Current Level of Mobility?: Ambulatory-Up Ad Nichelle  How was this patient Mobilized today?: Bedside Commode,  Up to Toilet/Shower, and Up in Room                 With Whom? Self                 Level of Difficulty/Assistance: Independent     Pt resting in bed at this time on  2 L O2. Pt denies shortness of breath. Pt without lower extremity edema.      Patient and/or Family's stated Goal of Care this Admission: reduce shortness of breath, increase activity tolerance, better understand heart failure and disease management, and reduce lower extremity edema prior to discharge        :

## 2020-09-19 NOTE — PROGRESS NOTES
A Camryn stress test was completed on this patient as ordered. The patient tolerated the procedure well. Awaiting stress imaging at this time.

## 2020-09-19 NOTE — PLAN OF CARE
Problem: Falls - Risk of:  Goal: Will remain free from falls  Description: Will remain free from falls  Outcome: Ongoing  Note: Pt at risk for falls. Educated on fall precautions. Pt alert and oriented, calls out appropriately. Bed alarm active and audible. Non-skid socks on. Pt remains free from falls, will continue to monitor. Problem: OXYGENATION/RESPIRATORY FUNCTION  Goal: Patient will maintain patent airway  Outcome: Ongoing  Note:   Patient's EF (Ejection Fraction) is equal to 40%    Heart Failure Medications:  Diuretics[de-identified] None    (One of the following REQUIRED for EF <40%/SYSTOLIC FAILURE but MAY be used in EF% >40%/DIASTOLIC FAILURE)        ACE[de-identified] None        ARB[de-identified] None         ARNI[de-identified] None    (Beta Blockers)  NON- Evidenced Based Beta Blocker (for EF% >40%/DIASTOLIC FAILURE): Metoprolol TARTrate- Lopressor and None    Evidenced Based Beta Blocker::(REQUIRED for EF% <40%/SYSTOLIC FAILURE) None  . .................................................................................................................................................. Patient's weights and intake/output reviewed: Yes    Patient's Last Weight: 110 lbs obtained by  statement on admission . Intake/Output Summary (Last 24 hours) at 9/19/2020 0304  Last data filed at 9/19/2020 0257  Gross per 24 hour   Intake 1200 ml   Output 0 ml   Net 1200 ml       Comorbidities Reviewed Yes    Patient has a past medical history of NADJA (acute kidney injury) ([facility] [address] Asthma, COPD (chronic obstructive pulmonary disease) ([facility] [address] Hyperlipidemia, Hypertension, MRSA (methicillin resistant staph aureus) culture positive, and OA (osteoarthritis).      >>For CHF and Comorbidity documentation on Education Time and Topics, please see Education Tab    Progressive Mobility Assessment:  What is this patient's Current Level of Mobility?: Ambulatory-Up Ad Nichelle  How was this patient Mobilized today?: Edge of Bed, Up to Chair, Bedside Commode,  Up to

## 2020-09-21 ENCOUNTER — TELEPHONE (OUTPATIENT)
Dept: CARDIOLOGY | Age: 80
End: 2020-09-21

## 2020-09-21 NOTE — TELEPHONE ENCOUNTER
I called the patient to schedule an appointment with Dr. Soraya Delong.  The patient will call back to schedule an appointment, she has to check with her granddaughter first.

## 2020-10-09 NOTE — DISCHARGE SUMMARY
Hospital Medicine Discharge Summary    Patient ID: Laurie Salomon      Patient's PCP: Gigi Deluca MD    Admit Date: 9/18/2020     Discharge Date: 9/19/2020      Admitting Physician: Holger Hi MD     Discharge Physician: CLIFTON Vincent - CNP     Discharge Diagnoses: Active Hospital Problems    Diagnosis    Chest pain [R07.9]       The patient was seen and examined on day of discharge and this discharge summary is in conjunction with any daily progress note from day of discharge. Hospital Course:   79 y.o. female who presented to [facility] with chest pain. Pain is right sided and radiates toward her back. Pain started yesterday after a meal. She has a prior history of a CABG in 2019 but has never had symptoms like this before. She has had intermittent nausea since the symptoms began. She denies fevers, chills, SOB, or diarrhea.   Pt was admitted and treated in the following:     Chest pain with known CAD  - s/p CABG 2019  - EKG negative  - troponin chronically elevated. Will continue to trend  - cardiology consulted  - stress test ordered  - Echo ordered  - continue home plavix, metoprolol, lipitor  - Plan  1. Stress test negative for inducible ischemia. 2. Echo slated to be performed Monday. She can be re-evaluated within 1 week of discharge with consideration given to performing this on OP basis but I do not feel that this needs to be performed while here given the atypical nature of the pain. 3. Continue beta blocker,statin, plavix  3. Losartan held due to NADJA; Scr trending down. Restart when able for goal BP <130/80.         NADJA  - possibly pre-renal  - holding home cozaar  - continue to monitor     HTN  - well controlled  - continue home metoprolol  - Losartan held due to NADJA; Scr trending down. Restart when able for goal BP <130/80.      HLD  - continue home statin     COPD  - no evidence of exacerbation  - continue home inhalers     Anxiety  - mood stable  - continue home xanax. OARRS reviewed             Physical Exam Performed:     BP (!) 148/69   Pulse 72   Temp 97.6 °F (36.4 °C) (Oral)   Resp 16   Ht 5' 4\" (1.626 m)   Wt 110 lb (49.9 kg)   SpO2 100%   BMI 18.88 kg/m²       General appearance:  No apparent distress, appears stated age and cooperative. HEENT:  Normal cephalic, atraumatic without obvious deformity. Pupils equal, round, and reactive to light. Extra ocular muscles intact. Conjunctivae/corneas clear. Neck: Supple, with full range of motion. No jugular venous distention. Trachea midline. Respiratory:  Normal respiratory effort. Clear to auscultation, bilaterally without Rales/Wheezes/Rhonchi. Cardiovascular:  Regular rate and rhythm with normal S1/S2 without murmurs, rubs or gallops. Abdomen: Soft, non-tender, non-distended with normal bowel sounds. Musculoskeletal:  No clubbing, cyanosis or edema bilaterally. Full range of motion without deformity. Skin: Skin color, texture, turgor normal.  No rashes or lesions. Neurologic:  Neurovascularly intact without any focal sensory/motor deficits. Cranial nerves: II-XII intact, grossly non-focal.  Psychiatric:  Alert and oriented, thought content appropriate, normal insight  Capillary Refill: Brisk,< 3 seconds   Peripheral Pulses: +2 palpable, equal bilaterally       Labs:  For convenience and continuity at follow-up the following most recent labs are provided:      CBC:    Lab Results   Component Value Date    WBC 5.1 09/19/2020    HGB 11.9 09/19/2020    HCT 36.9 09/19/2020     09/19/2020       Renal:    Lab Results   Component Value Date     09/19/2020    K 4.8 09/19/2020     09/19/2020    CO2 29 09/19/2020    BUN 29 09/19/2020    CREATININE 1.3 09/19/2020    CALCIUM 9.4 09/19/2020    PHOS 2.8 09/19/2019         Significant Diagnostic Studies    Radiology:   NM Cardiac Stress Test Nuclear Imaging   Final Result      CT CHEST ABDOMEN PELVIS WO CONTRAST   Final Result   No acute abnormality demonstrated chest abdomen or pelvis. No evidence for   abnormal mass or inflammatory process. No evidence for osseous abnormality         XR CHEST (2 VW)   Final Result   No acute process. COPD                Consults:     IP CONSULT TO CARDIOLOGY  IP CONSULT TO HOSPITALIST    Disposition:  Home     Condition at Discharge: Stable    Discharge Instructions/Follow-up:  Umberto Banegas above     Code Status:  Prior   Activity: activity as tolerated    Diet: cardiac diet      Discharge Medications:     Discharge Medication List as of 9/19/2020  1:47 PM           Details   acetaminophen (TYLENOL) 500 MG tablet Take 500 mg by mouth every 6 hours as needed for PainHistorical Med      sertraline (ZOLOFT) 50 MG tablet Take 50 mg by mouth dailyHistorical Med      ALPRAZolam (XANAX) 0.25 MG tablet Take 0.25 mg by mouth 3 times daily as needed for Sleep. Historical Med      budesonide (PULMICORT) 0.5 MG/2ML nebulizer suspension Take 2 mLs by nebulization 2 times daily, Disp-60 ampule, R-3Normal      ipratropium-albuterol (DUONEB) 0.5-2.5 (3) MG/3ML SOLN nebulizer solution Inhale 3 mLs into the lungs every 6 hours, Disp-360 mL, R-0Normal      atorvastatin (LIPITOR) 20 MG tablet Take 1 tablet by mouth nightly, Disp-30 tablet, R-3Normal      metoprolol tartrate (LOPRESSOR) 25 MG tablet Take 1 tablet by mouth 2 times daily, Disp-60 tablet, R-3Normal      docusate sodium (COLACE, DULCOLAX) 100 MG CAPS Take 100 mg by mouth 2 times daily, Disp-30 capsule, R-0Normal      famotidine (PEPCID) 20 MG tablet Take 1 tablet by mouth daily, Disp-60 tablet, R-3Normal      losartan (COZAAR) 25 MG tablet Take 25 mg by mouth dailyHistorical Med      clopidogrel (PLAVIX) 75 MG tablet Take 1 tablet by mouth daily, Disp-30 tablet, R-3Print      tiotropium (SPIRIVA HANDIHALER) 18 MCG inhalation capsule Inhale 18 mcg into the lungs dailyHistorical Med      budesonide-formoterol (SYMBICORT) 160-4.5 MCG/ACT AERO Inhale 2 puffs into the lungs 2 times dailyHistorical Med      albuterol (PROVENTIL HFA) 108 (90 BASE) MCG/ACT inhaler Inhale 2 puffs into the lungs every 6 hours as needed for Wheezing or Shortness of Breath., Disp-1 Inhaler, R-2Print             Time Spent on discharge is more than 30 minutes in the examination, evaluation, counseling and review of medications and discharge plan. Signed:    CLIFTON Maldonado - CNP   10/9/2020      Thank you aNe Matthew MD for the opportunity to be involved in this patient's care. If you have any questions or concerns please feel free to contact me at [phone].

## 2020-11-03 PROBLEM — I25.10 CAD (CORONARY ARTERY DISEASE): Status: RESOLVED | Noted: 2019-09-08 | Resolved: 2020-11-03

## 2021-06-01 ENCOUNTER — HOSPITAL ENCOUNTER (INPATIENT)
Age: 81
LOS: 3 days | Discharge: HOME HEALTH | DRG: 191 | End: 2021-06-04
Attending: EMERGENCY MEDICINE | Admitting: HOSPITALIST
Payer: MEDICARE

## 2021-06-01 ENCOUNTER — APPOINTMENT (OUTPATIENT)
Dept: GENERAL RADIOLOGY | Age: 81
DRG: 191 | End: 2021-06-01
Payer: MEDICARE

## 2021-06-01 DIAGNOSIS — J44.1 COPD EXACERBATION (HCC): Primary | ICD-10-CM

## 2021-06-01 DIAGNOSIS — J96.21 ACUTE ON CHRONIC RESPIRATORY FAILURE WITH HYPOXIA (HCC): ICD-10-CM

## 2021-06-01 LAB
A/G RATIO: 1.4 (ref 1.1–2.2)
ALBUMIN SERPL-MCNC: 4.1 G/DL (ref 3.4–5)
ALP BLD-CCNC: 73 U/L (ref 40–129)
ALT SERPL-CCNC: 13 U/L (ref 10–40)
ANION GAP SERPL CALCULATED.3IONS-SCNC: 10 MMOL/L (ref 3–16)
AST SERPL-CCNC: 20 U/L (ref 15–37)
BASOPHILS ABSOLUTE: 0 K/UL (ref 0–0.2)
BASOPHILS RELATIVE PERCENT: 0.1 %
BILIRUB SERPL-MCNC: 0.4 MG/DL (ref 0–1)
BUN BLDV-MCNC: 19 MG/DL (ref 7–20)
CALCIUM SERPL-MCNC: 10.1 MG/DL (ref 8.3–10.6)
CHLORIDE BLD-SCNC: 107 MMOL/L (ref 99–110)
CO2: 28 MMOL/L (ref 21–32)
CREAT SERPL-MCNC: 1 MG/DL (ref 0.6–1.2)
EKG ATRIAL RATE: 100 BPM
EKG DIAGNOSIS: NORMAL
EKG P AXIS: 78 DEGREES
EKG P-R INTERVAL: 156 MS
EKG Q-T INTERVAL: 358 MS
EKG QRS DURATION: 76 MS
EKG QTC CALCULATION (BAZETT): 461 MS
EKG R AXIS: -36 DEGREES
EKG T AXIS: 76 DEGREES
EKG VENTRICULAR RATE: 100 BPM
EOSINOPHILS ABSOLUTE: 0 K/UL (ref 0–0.6)
EOSINOPHILS RELATIVE PERCENT: 0 %
GFR AFRICAN AMERICAN: >60
GFR NON-AFRICAN AMERICAN: 53
GLOBULIN: 3 G/DL
GLUCOSE BLD-MCNC: 101 MG/DL (ref 70–99)
HCT VFR BLD CALC: 32.2 % (ref 36–48)
HEMOGLOBIN: 10.1 G/DL (ref 12–16)
INFLUENZA A: NOT DETECTED
INFLUENZA B: NOT DETECTED
LACTIC ACID, SEPSIS: 1.8 MMOL/L (ref 0.4–1.9)
LYMPHOCYTES ABSOLUTE: 0.7 K/UL (ref 1–5.1)
LYMPHOCYTES RELATIVE PERCENT: 6.5 %
MCH RBC QN AUTO: 28 PG (ref 26–34)
MCHC RBC AUTO-ENTMCNC: 31.2 G/DL (ref 31–36)
MCV RBC AUTO: 89.5 FL (ref 80–100)
MONOCYTES ABSOLUTE: 0.6 K/UL (ref 0–1.3)
MONOCYTES RELATIVE PERCENT: 5.9 %
NEUTROPHILS ABSOLUTE: 9.3 K/UL (ref 1.7–7.7)
NEUTROPHILS RELATIVE PERCENT: 87.5 %
PDW BLD-RTO: 15 % (ref 12.4–15.4)
PLATELET # BLD: 214 K/UL (ref 135–450)
PMV BLD AUTO: 8.4 FL (ref 5–10.5)
POTASSIUM REFLEX MAGNESIUM: 5 MMOL/L (ref 3.5–5.1)
RBC # BLD: 3.6 M/UL (ref 4–5.2)
SARS-COV-2 RNA, RT PCR: NOT DETECTED
SODIUM BLD-SCNC: 145 MMOL/L (ref 136–145)
TOTAL PROTEIN: 7.1 G/DL (ref 6.4–8.2)
TROPONIN: 0.04 NG/ML
TROPONIN: 0.04 NG/ML
WBC # BLD: 10.7 K/UL (ref 4–11)

## 2021-06-01 PROCEDURE — 84466 ASSAY OF TRANSFERRIN: CPT

## 2021-06-01 PROCEDURE — 87636 SARSCOV2 & INF A&B AMP PRB: CPT

## 2021-06-01 PROCEDURE — 83540 ASSAY OF IRON: CPT

## 2021-06-01 PROCEDURE — 36415 COLL VENOUS BLD VENIPUNCTURE: CPT

## 2021-06-01 PROCEDURE — 93010 ELECTROCARDIOGRAM REPORT: CPT | Performed by: INTERNAL MEDICINE

## 2021-06-01 PROCEDURE — 6370000000 HC RX 637 (ALT 250 FOR IP): Performed by: INTERNAL MEDICINE

## 2021-06-01 PROCEDURE — 6360000002 HC RX W HCPCS: Performed by: EMERGENCY MEDICINE

## 2021-06-01 PROCEDURE — 2580000003 HC RX 258: Performed by: EMERGENCY MEDICINE

## 2021-06-01 PROCEDURE — 2500000003 HC RX 250 WO HCPCS: Performed by: EMERGENCY MEDICINE

## 2021-06-01 PROCEDURE — 96374 THER/PROPH/DIAG INJ IV PUSH: CPT

## 2021-06-01 PROCEDURE — 85025 COMPLETE CBC W/AUTO DIFF WBC: CPT

## 2021-06-01 PROCEDURE — 71046 X-RAY EXAM CHEST 2 VIEWS: CPT

## 2021-06-01 PROCEDURE — 2060000000 HC ICU INTERMEDIATE R&B

## 2021-06-01 PROCEDURE — 80053 COMPREHEN METABOLIC PANEL: CPT

## 2021-06-01 PROCEDURE — 99285 EMERGENCY DEPT VISIT HI MDM: CPT

## 2021-06-01 PROCEDURE — 6370000000 HC RX 637 (ALT 250 FOR IP): Performed by: NURSE PRACTITIONER

## 2021-06-01 PROCEDURE — 94761 N-INVAS EAR/PLS OXIMETRY MLT: CPT

## 2021-06-01 PROCEDURE — 94640 AIRWAY INHALATION TREATMENT: CPT

## 2021-06-01 PROCEDURE — 83605 ASSAY OF LACTIC ACID: CPT

## 2021-06-01 PROCEDURE — 87040 BLOOD CULTURE FOR BACTERIA: CPT

## 2021-06-01 PROCEDURE — 93005 ELECTROCARDIOGRAM TRACING: CPT | Performed by: EMERGENCY MEDICINE

## 2021-06-01 PROCEDURE — 2700000000 HC OXYGEN THERAPY PER DAY

## 2021-06-01 PROCEDURE — 84484 ASSAY OF TROPONIN QUANT: CPT

## 2021-06-01 PROCEDURE — 82728 ASSAY OF FERRITIN: CPT

## 2021-06-01 PROCEDURE — 6370000000 HC RX 637 (ALT 250 FOR IP): Performed by: EMERGENCY MEDICINE

## 2021-06-01 RX ORDER — SODIUM CHLORIDE 0.9 % (FLUSH) 0.9 %
5-40 SYRINGE (ML) INJECTION PRN
Status: DISCONTINUED | OUTPATIENT
Start: 2021-06-01 | End: 2021-06-04 | Stop reason: HOSPADM

## 2021-06-01 RX ORDER — PREDNISONE 20 MG/1
40 TABLET ORAL DAILY
Status: DISCONTINUED | OUTPATIENT
Start: 2021-06-03 | End: 2021-06-02

## 2021-06-01 RX ORDER — METHYLPREDNISOLONE SODIUM SUCCINATE 40 MG/ML
40 INJECTION, POWDER, LYOPHILIZED, FOR SOLUTION INTRAMUSCULAR; INTRAVENOUS EVERY 12 HOURS
Status: DISCONTINUED | OUTPATIENT
Start: 2021-06-01 | End: 2021-06-02

## 2021-06-01 RX ORDER — SODIUM CHLORIDE 0.9 % (FLUSH) 0.9 %
5-40 SYRINGE (ML) INJECTION EVERY 12 HOURS SCHEDULED
Status: DISCONTINUED | OUTPATIENT
Start: 2021-06-01 | End: 2021-06-04 | Stop reason: HOSPADM

## 2021-06-01 RX ORDER — FERROUS SULFATE 325(65) MG
325 TABLET ORAL
COMMUNITY

## 2021-06-01 RX ORDER — CLOPIDOGREL BISULFATE 75 MG/1
75 TABLET ORAL DAILY
Status: DISCONTINUED | OUTPATIENT
Start: 2021-06-01 | End: 2021-06-04 | Stop reason: HOSPADM

## 2021-06-01 RX ORDER — POTASSIUM CHLORIDE 1.5 G/1.77G
20 POWDER, FOR SOLUTION ORAL DAILY
Status: ON HOLD | COMMUNITY
End: 2021-09-27 | Stop reason: HOSPADM

## 2021-06-01 RX ORDER — IPRATROPIUM BROMIDE AND ALBUTEROL SULFATE 2.5; .5 MG/3ML; MG/3ML
2 SOLUTION RESPIRATORY (INHALATION) ONCE
Status: COMPLETED | OUTPATIENT
Start: 2021-06-01 | End: 2021-06-01

## 2021-06-01 RX ORDER — IPRATROPIUM BROMIDE AND ALBUTEROL SULFATE 2.5; .5 MG/3ML; MG/3ML
3 SOLUTION RESPIRATORY (INHALATION)
Status: DISCONTINUED | OUTPATIENT
Start: 2021-06-01 | End: 2021-06-01

## 2021-06-01 RX ORDER — FAMOTIDINE 20 MG/1
20 TABLET, FILM COATED ORAL DAILY
Status: DISCONTINUED | OUTPATIENT
Start: 2021-06-01 | End: 2021-06-04 | Stop reason: HOSPADM

## 2021-06-01 RX ORDER — FUROSEMIDE 20 MG/1
20 TABLET ORAL DAILY
Status: ON HOLD | COMMUNITY
End: 2021-09-27 | Stop reason: SDUPTHER

## 2021-06-01 RX ORDER — ACETAMINOPHEN 325 MG/1
650 TABLET ORAL EVERY 6 HOURS PRN
Status: DISCONTINUED | OUTPATIENT
Start: 2021-06-01 | End: 2021-06-04 | Stop reason: HOSPADM

## 2021-06-01 RX ORDER — POLYETHYLENE GLYCOL 3350 17 G/17G
17 POWDER, FOR SOLUTION ORAL DAILY PRN
Status: DISCONTINUED | OUTPATIENT
Start: 2021-06-01 | End: 2021-06-04 | Stop reason: HOSPADM

## 2021-06-01 RX ORDER — SODIUM CHLORIDE 9 MG/ML
25 INJECTION, SOLUTION INTRAVENOUS PRN
Status: DISCONTINUED | OUTPATIENT
Start: 2021-06-01 | End: 2021-06-04 | Stop reason: HOSPADM

## 2021-06-01 RX ORDER — BUDESONIDE 0.5 MG/2ML
0.5 INHALANT ORAL 2 TIMES DAILY
Status: DISCONTINUED | OUTPATIENT
Start: 2021-06-01 | End: 2021-06-04 | Stop reason: HOSPADM

## 2021-06-01 RX ORDER — ALPRAZOLAM 0.25 MG/1
0.25 TABLET ORAL 3 TIMES DAILY PRN
Status: DISCONTINUED | OUTPATIENT
Start: 2021-06-01 | End: 2021-06-03

## 2021-06-01 RX ORDER — ATORVASTATIN CALCIUM 10 MG/1
20 TABLET, FILM COATED ORAL NIGHTLY
Status: DISCONTINUED | OUTPATIENT
Start: 2021-06-01 | End: 2021-06-04 | Stop reason: HOSPADM

## 2021-06-01 RX ORDER — IPRATROPIUM BROMIDE AND ALBUTEROL SULFATE 2.5; .5 MG/3ML; MG/3ML
1 SOLUTION RESPIRATORY (INHALATION) ONCE
Status: COMPLETED | OUTPATIENT
Start: 2021-06-01 | End: 2021-06-01

## 2021-06-01 RX ORDER — METHYLPREDNISOLONE SODIUM SUCCINATE 125 MG/2ML
80 INJECTION, POWDER, LYOPHILIZED, FOR SOLUTION INTRAMUSCULAR; INTRAVENOUS ONCE
Status: COMPLETED | OUTPATIENT
Start: 2021-06-01 | End: 2021-06-01

## 2021-06-01 RX ORDER — IPRATROPIUM BROMIDE AND ALBUTEROL SULFATE 2.5; .5 MG/3ML; MG/3ML
3 SOLUTION RESPIRATORY (INHALATION) EVERY 4 HOURS
Status: DISCONTINUED | OUTPATIENT
Start: 2021-06-01 | End: 2021-06-02

## 2021-06-01 RX ORDER — ACETAMINOPHEN 650 MG/1
650 SUPPOSITORY RECTAL EVERY 6 HOURS PRN
Status: DISCONTINUED | OUTPATIENT
Start: 2021-06-01 | End: 2021-06-04 | Stop reason: HOSPADM

## 2021-06-01 RX ORDER — PROMETHAZINE HYDROCHLORIDE 25 MG/1
12.5 TABLET ORAL EVERY 6 HOURS PRN
Status: DISCONTINUED | OUTPATIENT
Start: 2021-06-01 | End: 2021-06-04 | Stop reason: HOSPADM

## 2021-06-01 RX ORDER — ALBUTEROL SULFATE 2.5 MG/3ML
2.5 SOLUTION RESPIRATORY (INHALATION)
Status: DISCONTINUED | OUTPATIENT
Start: 2021-06-01 | End: 2021-06-04 | Stop reason: HOSPADM

## 2021-06-01 RX ORDER — DOXYCYCLINE HYCLATE 100 MG
100 TABLET ORAL EVERY 12 HOURS SCHEDULED
Status: DISCONTINUED | OUTPATIENT
Start: 2021-06-01 | End: 2021-06-04 | Stop reason: HOSPADM

## 2021-06-01 RX ORDER — PANTOPRAZOLE SODIUM 20 MG/1
40 TABLET, DELAYED RELEASE ORAL DAILY
Status: ON HOLD | COMMUNITY
End: 2022-03-30

## 2021-06-01 RX ORDER — LOSARTAN POTASSIUM 25 MG/1
25 TABLET ORAL DAILY
Status: DISCONTINUED | OUTPATIENT
Start: 2021-06-01 | End: 2021-06-04

## 2021-06-01 RX ORDER — DOCUSATE SODIUM 100 MG/1
100 CAPSULE, LIQUID FILLED ORAL 2 TIMES DAILY
Status: DISCONTINUED | OUTPATIENT
Start: 2021-06-01 | End: 2021-06-04 | Stop reason: HOSPADM

## 2021-06-01 RX ORDER — ONDANSETRON 2 MG/ML
4 INJECTION INTRAMUSCULAR; INTRAVENOUS EVERY 6 HOURS PRN
Status: DISCONTINUED | OUTPATIENT
Start: 2021-06-01 | End: 2021-06-04 | Stop reason: HOSPADM

## 2021-06-01 RX ADMIN — IPRATROPIUM BROMIDE AND ALBUTEROL SULFATE 2 AMPULE: .5; 3 SOLUTION RESPIRATORY (INHALATION) at 14:03

## 2021-06-01 RX ADMIN — IPRATROPIUM BROMIDE AND ALBUTEROL SULFATE 3 ML: .5; 3 SOLUTION RESPIRATORY (INHALATION) at 23:12

## 2021-06-01 RX ADMIN — DOXYCYCLINE 100 MG: 100 INJECTION, POWDER, LYOPHILIZED, FOR SOLUTION INTRAVENOUS at 15:52

## 2021-06-01 RX ADMIN — IPRATROPIUM BROMIDE AND ALBUTEROL SULFATE 3 ML: .5; 3 SOLUTION RESPIRATORY (INHALATION) at 19:39

## 2021-06-01 RX ADMIN — IPRATROPIUM BROMIDE AND ALBUTEROL SULFATE 1 AMPULE: .5; 3 SOLUTION RESPIRATORY (INHALATION) at 15:50

## 2021-06-01 RX ADMIN — METHYLPREDNISOLONE SODIUM SUCCINATE 80 MG: 125 INJECTION, POWDER, FOR SOLUTION INTRAMUSCULAR; INTRAVENOUS at 14:03

## 2021-06-01 NOTE — PROGRESS NOTES
Admit to PCU on tele  COPD exacerbation    Siena Bains [facility]  6/1/2021

## 2021-06-01 NOTE — ED PROVIDER NOTES
[address] ED      CHIEF COMPLAINT  Shortness of Breath (increased sob upon exertion no cp increased home oxygen to 3 liters. )       HISTORY OF PRESENT ILLNESS  Carter Harmon is a 79 y.o. female  who presents to the ED complaining of feeling like she can't breathe. States can breathe if still but with any exertion at all, even going to the bathroom, can't catch breath. Does have anxiety as well. Felt like it might be the anxiety but worsening, and didn't feel well into her chest.  Took prednisone 2.5mg for a few days without improvement. Now with congestion, white mucus with cough. No chest pain. On 2L O2 at home at baseline, but upped to 3L over past few days. No vomiting or diarrhea. No falls. Decreased PO during this time. States medics hit her left leg against bed when putting her in cot and now with wound to leg. Unsure of last Td, but per chart next is due in 2028 so appears to be UTD. Sees her PCP for her pulmonary care. No other complaints, modifying factors or associated symptoms. I have reviewed the following from the nursing documentation.     Past Medical History:   Diagnosis Date    NADJA (acute kidney injury) ([facility] [address]     Asthma     COPD (chronic obstructive pulmonary disease) ([facility] [address]     Hyperlipidemia     Hypertension     MRSA (methicillin resistant staph aureus) culture positive 09/22/2019    + resp cx    OA (osteoarthritis) 8/12/2014     Past Surgical History:   Procedure Laterality Date    BRONCHOSCOPY  09/08/2019    Dr. Tammy Pinto - w/BAL   [address]  09/05/2019    Dr. Kehinde Au 2/24/2020    COLONOSCOPY DIAGNOSTIC performed by Denice Ansari DO at [address] N/A 9/19/2019    OFF PUMP CORONARY ARTERY BYPASS GRAFTING X2, INTERNAL MAMMARY ARTERY, SAPHENOUS VEIN GRAFT performed by Natividad Clarke MD at [address], PARTIAL Left     SIGMOIDOSCOPY 2020    4 bands    SIGMOIDOSCOPY N/A 2020    FLEX SIG W/ BANDING SIGMOIDOSCOPY DIAGNOSTIC FLEXIBLE performed by Joesphine Cooks, DO at [address]     Family History   Problem Relation Age of Onset    Cancer Mother     Heart Disease Father     Stroke Father     Heart Disease Sister     Stroke Sister      Social History     Socioeconomic History    Marital status:      Spouse name: Not on file    Number of children: Not on file    Years of education: Not on file    Highest education level: Not on file   Occupational History    Not on file   Tobacco Use    Smoking status: Former Smoker     Packs/day: 0.50     Years: 50.00     Pack years: 25.00     Types: Cigarettes     Quit date: 2019     Years since quittin.7    Smokeless tobacco: Never Used    Tobacco comment: advised to quit   Vaping Use    Vaping Use: Never assessed   Substance and Sexual Activity    Alcohol use: No    Drug use: No    Sexual activity: Not Currently     Comment: tabacco- Pk Day   Other Topics Concern    Not on file   Social History Narrative    Not on file     Social Determinants of Health     Financial Resource Strain:     Difficulty of Paying Living Expenses:    Food Insecurity:     Worried About Running Out of Food in the Last Year:     [address] in the Last Year:    Transportation Needs:     Lack of Transportation (Medical):      Lack of Transportation (Non-Medical):    Physical Activity:     Days of Exercise per Week:     Minutes of Exercise per Session:    Stress:     Feeling of Stress :    Social Connections:     Frequency of Communication with Friends and Family:     Frequency of Social Gatherings with Friends and Family:     Attends Gn[employer] Services:     Active Member of Clubs or Organizations:     Attends Club or Organization Meetings:     Marital Status:    Intimate Partner Violence:     Fear of Current or Ex-Partner:     Emotionally Abused:     Physically EXAM  BP 99/68   Pulse 110   Temp 98.1 °F (36.7 °C) (Oral)   Resp (!) 31   Ht 5' 5\" (1.651 m)   Wt 115 lb (52.2 kg)   SpO2 97%   BMI 19.14 kg/m²    GENERAL APPEARANCE: Awake and alert. Cooperative. No acute distress. HENT: Normocephalic. Atraumatic. Mucous membranes are moist.  No drooling or stridor. NECK: Supple. EYES: PERRL. EOM's grossly intact. HEART/CHEST: RRR. No murmurs. 2+ radial pulses b/l. LUNGS: Respirations mildly labored. occasional scattered wheeze. Poor air exchange. Speaking comfortably in full sentences. ABDOMEN: diffuse discomfort with palpation without focal tenderness. Soft. Non-distended. No masses. No organomegaly. No guarding or rebound. MUSCULOSKELETAL: No extremity edema. Compartments soft. No deformity. No tenderness in the extremities. All extremities neurovascularly intact. SKIN: Warm and dry. No acute rashes. Skin tear to left anterior leg with no current bleeding. NEUROLOGICAL: Alert and oriented. CN's 2-12 intact. No gross facial drooping. Strength 5/5, sensation intact. No gross focal deficits. PSYCHIATRIC: Normal mood and affect. LABS  I have reviewed all labs for this visit.    Results for orders placed or performed during the hospital encounter of 06/01/21   COVID-19 & Influenza Combo    Specimen: Nasopharyngeal Swab; Nasal   Result Value Ref Range    SARS-CoV-2 RNA, RT PCR NOT DETECTED NOT DETECTED    INFLUENZA A NOT DETECTED NOT DETECTED    INFLUENZA B NOT DETECTED NOT DETECTED   CBC Auto Differential   Result Value Ref Range    WBC 10.7 4.0 - 11.0 K/uL    RBC 3.60 (L) 4.00 - 5.20 M/uL    Hemoglobin 10.1 (L) 12.0 - 16.0 g/dL    Hematocrit 32.2 (L) 36.0 - 48.0 %    MCV 89.5 80.0 - 100.0 fL    MCH 28.0 26.0 - 34.0 pg    MCHC 31.2 31.0 - 36.0 g/dL    RDW 15.0 12.4 - 15.4 %    Platelets 427 102 - 914 K/uL    MPV 8.4 5.0 - 10.5 fL    Neutrophils % 87.5 %    Lymphocytes % 6.5 %    Monocytes % 5.9 %    Eosinophils % 0.0 %    Basophils % 0.1 %    Neutrophils Absolute 9.3 (H) 1.7 - 7.7 K/uL    Lymphocytes Absolute 0.7 (L) 1.0 - 5.1 K/uL    Monocytes Absolute 0.6 0.0 - 1.3 K/uL    Eosinophils Absolute 0.0 0.0 - 0.6 K/uL    Basophils Absolute 0.0 0.0 - 0.2 K/uL   Comprehensive Metabolic Panel w/ Reflex to MG   Result Value Ref Range    Sodium 145 136 - 145 mmol/L    Potassium reflex Magnesium 5.0 3.5 - 5.1 mmol/L    Chloride 107 99 - 110 mmol/L    CO2 28 21 - 32 mmol/L    Anion Gap 10 3 - 16    Glucose 101 (H) 70 - 99 mg/dL    BUN 19 7 - 20 mg/dL    CREATININE 1.0 0.6 - 1.2 mg/dL    GFR Non- 53 (A) >60    GFR African American >60 >60    Calcium 10.1 8.3 - 10.6 mg/dL    Total Protein 7.1 6.4 - 8.2 g/dL    Albumin 4.1 3.4 - 5.0 g/dL    Albumin/Globulin Ratio 1.4 1.1 - 2.2    Total Bilirubin 0.4 0.0 - 1.0 mg/dL    Alkaline Phosphatase 73 40 - 129 U/L    ALT 13 10 - 40 U/L    AST 20 15 - 37 U/L    Globulin 3.0 g/dL   Troponin   Result Value Ref Range    Troponin 0.04 (H) <0.01 ng/mL   Lactate, Sepsis   Result Value Ref Range    Lactic Acid, Sepsis 1.8 0.4 - 1.9 mmol/L   Troponin   Result Value Ref Range    Troponin 0.04 (H) <0.01 ng/mL   EKG 12 Lead   Result Value Ref Range    Ventricular Rate 100 BPM    Atrial Rate 100 BPM    P-R Interval 156 ms    QRS Duration 76 ms    Q-T Interval 358 ms    QTc Calculation (Bazett) 461 ms    P Axis 78 degrees    R Axis -36 degrees    T Axis 76 degrees    Diagnosis       Sinus rhythm with Premature atrial complexesLeft axis deviationAnteroseptal infarct (cited on or before 18-SEP-2020)Diffuse ST abnormalitiesConfirmed by Tricia Davila MD, Taras Harley ([id number]) on 6/1/2021 3:13:30 PM       ECG  The Ekg interpreted by me shows  normal sinus rhythm with a rate of 100  Axis is   Left axis deviation  QTc is  within an acceptable range  Intervals and Durations are unremarkable.       ST Segments: nonspecific changes  No significant change from prior EKG dated 9/19/20    RADIOLOGY  XR CHEST (2 VW)    Result Date: 6/1/2021  EXAMINATION: TWO XRAY

## 2021-06-01 NOTE — PROGRESS NOTES
RESPIRATORY THERAPY ASSESSMENT    Name:  Federico Nazario  Medical Record Number:  [id number]  Age: 79 y.o. Gender: female  : 1940  Today's Date:  2021  Room:  /0313-02    Assessment     Is the patient being admitted for a COPD or Asthma exacerbation? Yes   (If yes the patient will be seen every 4 hours for the first 24 hours and then reassessed)    Patient Admission Diagnosis      Allergies  Allergies   Allergen Reactions    Latex      Burning      Codeine      \"hallucinations\"       Minimum Predicted Vital Capacity:               Actual Vital Capacity:                    Pulmonary History:COPD and Asthma  Home Oxygen Therapy:  2 liters/min via nasal cannula  Home Respiratory Therapy:Albuterol, Albuterol/Ipratropium Bromide HHN, Budesonide, Budesonide/Formoterol  and Tiotropium Bromide   Current Respiratory Therapy:  duoneb q4wa, pulmicort . 5 bid  Treatment Type: HHN  Medications: Albuterol/Ipratropium    Respiratory Severity Index(RSI)   Patients with orders for inhalation medications, oxygen, or any therapeutic treatment modality will be placed on Respiratory Protocol. They will be assessed with the first treatment and at least every 72 hours thereafter. The following severity scale will be used to determine frequency of treatment intervention.     Smoking History: Pulmonary Disease or Smoking History, Greater than 15 pack year = 2    Social History  Social History     Tobacco Use    Smoking status: Former Smoker     Packs/day: 0.50     Years: 50.00     Pack years: 25.00     Types: Cigarettes     Quit date: 2019     Years since quittin.7    Smokeless tobacco: Never Used    Tobacco comment: advised to quit   Vaping Use    Vaping Use: Never assessed   Substance Use Topics    Alcohol use: No    Drug use: No       Recent Surgical History: None = 0  Past Surgical History  Past Surgical History:   Procedure Laterality Date    BRONCHOSCOPY  2019    Dr. Silvio rgay/ALIYA Bell frequency. 5. Bronchodilator(s) will be discontinued if patient has a RSI less than 9 and has received no scheduled or as needed treatment for 72  Hrs. Patients Ordered on a Mucolytic Agent:    1. Must always be administered with a bronchodilator. 2. Discontinue if patient experiences worsened bronchospasm, or secretions have lessened to the point that the patient is able to clear them with a cough. Anti-inflammatory and Combination Medications:    1. If the patient lacks prior history of lung disease, is not using inhaled anti-inflammatory medication at home, and lacks wheezing by examination or by history for at least 24 hours, contact physician for possible discontinuation.

## 2021-06-02 LAB
ANION GAP SERPL CALCULATED.3IONS-SCNC: 7 MMOL/L (ref 3–16)
BUN BLDV-MCNC: 28 MG/DL (ref 7–20)
CALCIUM SERPL-MCNC: 9.4 MG/DL (ref 8.3–10.6)
CHLORIDE BLD-SCNC: 106 MMOL/L (ref 99–110)
CO2: 28 MMOL/L (ref 21–32)
CREAT SERPL-MCNC: 1.2 MG/DL (ref 0.6–1.2)
EKG ATRIAL RATE: 100 BPM
EKG DIAGNOSIS: NORMAL
EKG P AXIS: 80 DEGREES
EKG P-R INTERVAL: 166 MS
EKG Q-T INTERVAL: 370 MS
EKG QRS DURATION: 86 MS
EKG QTC CALCULATION (BAZETT): 477 MS
EKG R AXIS: -55 DEGREES
EKG T AXIS: 47 DEGREES
EKG VENTRICULAR RATE: 100 BPM
FERRITIN: 24.7 NG/ML (ref 15–150)
GFR AFRICAN AMERICAN: 52
GFR NON-AFRICAN AMERICAN: 43
GLUCOSE BLD-MCNC: 134 MG/DL (ref 70–99)
HCT VFR BLD CALC: 28 % (ref 36–48)
HEMOGLOBIN: 8.8 G/DL (ref 12–16)
IRON SATURATION: 5 % (ref 15–50)
IRON: 18 UG/DL (ref 37–145)
MCH RBC QN AUTO: 28.7 PG (ref 26–34)
MCHC RBC AUTO-ENTMCNC: 31.5 G/DL (ref 31–36)
MCV RBC AUTO: 91.2 FL (ref 80–100)
PDW BLD-RTO: 15.6 % (ref 12.4–15.4)
PLATELET # BLD: 172 K/UL (ref 135–450)
PMV BLD AUTO: 8.7 FL (ref 5–10.5)
POTASSIUM REFLEX MAGNESIUM: 3.9 MMOL/L (ref 3.5–5.1)
RBC # BLD: 3.07 M/UL (ref 4–5.2)
SODIUM BLD-SCNC: 141 MMOL/L (ref 136–145)
TOTAL IRON BINDING CAPACITY: 335 UG/DL (ref 260–445)
TRANSFERRIN: 284 MG/DL (ref 200–360)
TROPONIN: 0.02 NG/ML
WBC # BLD: 5.5 K/UL (ref 4–11)

## 2021-06-02 PROCEDURE — 36415 COLL VENOUS BLD VENIPUNCTURE: CPT

## 2021-06-02 PROCEDURE — 6360000002 HC RX W HCPCS: Performed by: NURSE PRACTITIONER

## 2021-06-02 PROCEDURE — 2060000000 HC ICU INTERMEDIATE R&B

## 2021-06-02 PROCEDURE — 99222 1ST HOSP IP/OBS MODERATE 55: CPT | Performed by: INTERNAL MEDICINE

## 2021-06-02 PROCEDURE — 94640 AIRWAY INHALATION TREATMENT: CPT

## 2021-06-02 PROCEDURE — 80048 BASIC METABOLIC PNL TOTAL CA: CPT

## 2021-06-02 PROCEDURE — 2580000003 HC RX 258: Performed by: NURSE PRACTITIONER

## 2021-06-02 PROCEDURE — 93005 ELECTROCARDIOGRAM TRACING: CPT | Performed by: NURSE PRACTITIONER

## 2021-06-02 PROCEDURE — 6370000000 HC RX 637 (ALT 250 FOR IP): Performed by: NURSE PRACTITIONER

## 2021-06-02 PROCEDURE — 99232 SBSQ HOSP IP/OBS MODERATE 35: CPT | Performed by: INTERNAL MEDICINE

## 2021-06-02 PROCEDURE — 6360000002 HC RX W HCPCS: Performed by: INTERNAL MEDICINE

## 2021-06-02 PROCEDURE — 93010 ELECTROCARDIOGRAM REPORT: CPT | Performed by: INTERNAL MEDICINE

## 2021-06-02 PROCEDURE — 84484 ASSAY OF TROPONIN QUANT: CPT

## 2021-06-02 PROCEDURE — 2700000000 HC OXYGEN THERAPY PER DAY

## 2021-06-02 PROCEDURE — 6370000000 HC RX 637 (ALT 250 FOR IP): Performed by: INTERNAL MEDICINE

## 2021-06-02 PROCEDURE — 85027 COMPLETE CBC AUTOMATED: CPT

## 2021-06-02 RX ORDER — IPRATROPIUM BROMIDE AND ALBUTEROL SULFATE 2.5; .5 MG/3ML; MG/3ML
3 SOLUTION RESPIRATORY (INHALATION) 3 TIMES DAILY
Status: DISCONTINUED | OUTPATIENT
Start: 2021-06-02 | End: 2021-06-04 | Stop reason: HOSPADM

## 2021-06-02 RX ADMIN — METOPROLOL TARTRATE 25 MG: 25 TABLET, FILM COATED ORAL at 00:25

## 2021-06-02 RX ADMIN — IPRATROPIUM BROMIDE AND ALBUTEROL SULFATE 3 ML: .5; 3 SOLUTION RESPIRATORY (INHALATION) at 03:03

## 2021-06-02 RX ADMIN — ATORVASTATIN CALCIUM 20 MG: 10 TABLET, FILM COATED ORAL at 00:25

## 2021-06-02 RX ADMIN — ACETAMINOPHEN 650 MG: 325 TABLET ORAL at 15:41

## 2021-06-02 RX ADMIN — Medication 10 ML: at 00:26

## 2021-06-02 RX ADMIN — ALPRAZOLAM 0.25 MG: 0.25 TABLET ORAL at 00:26

## 2021-06-02 RX ADMIN — Medication 10 ML: at 20:27

## 2021-06-02 RX ADMIN — IPRATROPIUM BROMIDE AND ALBUTEROL SULFATE 3 ML: .5; 3 SOLUTION RESPIRATORY (INHALATION) at 20:50

## 2021-06-02 RX ADMIN — DOXYCYCLINE HYCLATE 100 MG: 100 TABLET, COATED ORAL at 00:26

## 2021-06-02 RX ADMIN — Medication 10 ML: at 09:46

## 2021-06-02 RX ADMIN — DOCUSATE SODIUM 100 MG: 100 CAPSULE, LIQUID FILLED ORAL at 20:26

## 2021-06-02 RX ADMIN — DOXYCYCLINE HYCLATE 100 MG: 100 TABLET, COATED ORAL at 20:27

## 2021-06-02 RX ADMIN — ATORVASTATIN CALCIUM 20 MG: 10 TABLET, FILM COATED ORAL at 20:27

## 2021-06-02 RX ADMIN — METHYLPREDNISOLONE SODIUM SUCCINATE 40 MG: 40 INJECTION, POWDER, FOR SOLUTION INTRAMUSCULAR; INTRAVENOUS at 00:24

## 2021-06-02 RX ADMIN — LOSARTAN POTASSIUM 25 MG: 25 TABLET, FILM COATED ORAL at 09:45

## 2021-06-02 RX ADMIN — SERTRALINE HYDROCHLORIDE 50 MG: 50 TABLET ORAL at 09:45

## 2021-06-02 RX ADMIN — CEFTRIAXONE SODIUM 1000 MG: 1 INJECTION, POWDER, FOR SOLUTION INTRAMUSCULAR; INTRAVENOUS at 00:33

## 2021-06-02 RX ADMIN — ENOXAPARIN SODIUM 40 MG: 40 INJECTION SUBCUTANEOUS at 00:24

## 2021-06-02 RX ADMIN — CLOPIDOGREL BISULFATE 75 MG: 75 TABLET ORAL at 09:45

## 2021-06-02 RX ADMIN — ALPRAZOLAM 0.25 MG: 0.25 TABLET ORAL at 15:41

## 2021-06-02 RX ADMIN — FAMOTIDINE 20 MG: 20 TABLET ORAL at 00:25

## 2021-06-02 RX ADMIN — BUDESONIDE 500 MCG: 0.5 SUSPENSION RESPIRATORY (INHALATION) at 11:17

## 2021-06-02 RX ADMIN — DOCUSATE SODIUM 100 MG: 100 CAPSULE, LIQUID FILLED ORAL at 00:26

## 2021-06-02 RX ADMIN — FAMOTIDINE 20 MG: 20 TABLET ORAL at 09:45

## 2021-06-02 RX ADMIN — SERTRALINE HYDROCHLORIDE 50 MG: 50 TABLET ORAL at 00:26

## 2021-06-02 RX ADMIN — DOXYCYCLINE HYCLATE 100 MG: 100 TABLET, COATED ORAL at 09:45

## 2021-06-02 RX ADMIN — METOPROLOL TARTRATE 25 MG: 25 TABLET, FILM COATED ORAL at 20:27

## 2021-06-02 RX ADMIN — METHYLPREDNISOLONE SODIUM SUCCINATE 40 MG: 40 INJECTION, POWDER, FOR SOLUTION INTRAMUSCULAR; INTRAVENOUS at 06:56

## 2021-06-02 RX ADMIN — ALBUTEROL SULFATE 2.5 MG: 2.5 SOLUTION RESPIRATORY (INHALATION) at 11:17

## 2021-06-02 ASSESSMENT — PAIN SCALES - GENERAL: PAINLEVEL_OUTOF10: 6

## 2021-06-02 NOTE — PROGRESS NOTES
Patient is resting comfortably in bed at this time and denies further needs. Call light within reach. Will continue to monitor.

## 2021-06-02 NOTE — PROGRESS NOTES
Bedside report and transfer of care given to [facility], RN. Pt currently resting in bed with the call light within reach. Pt denies any other care needs at this time. Pt stable at this time.

## 2021-06-02 NOTE — CONSULTS
Reason for referral and CC: SOB, COPD    HISTORY OF PRESENT ILLNESS: 78 yo female with history of COPD and anxiety and home oxygen presented with increased shortness of breath. She has cough with clear phlegm. She was admitted for COPD exacerbation. She admits to some continued cough and shortness of breath today. No fever or chest pain. Past Medical History:   Diagnosis Date    [facility]JA [facility]acute [facility]) ([facility] [address]     Asthma     COPD (chronic obstructive pulmonary disease) ([facility] [address]     Hyperlipidemia     Hypertension     MRSA (methicillin resistant staph aureus) culture positive 09/22/2019    + resp cx    OA (osteoarthritis) 8/12/2014     Past Surgical History:   Procedure Laterality Date    BRONCHOSCOPY  09/08/2019    Dr. Melgoza Brood - w/BAL   [address]  09/05/2019    Dr. Piter Driscoll 2/24/2020    COLONOSCOPY DIAGNOSTIC performed by Getachew Desouza DO at [address] N/A 9/19/2019    OFF PUMP CORONARY ARTERY BYPASS GRAFTING X2, INTERNAL MAMMARY ARTERY, SAPHENOUS VEIN GRAFT performed by Nika Al MD at [address], PARTIAL Left     SIGMOIDOSCOPY  02/27/2020    4 bands    SIGMOIDOSCOPY N/A 2/27/2020    FLEX SIG W/ BANDING SIGMOIDOSCOPY DIAGNOSTIC FLEXIBLE performed by Getachew Desouza DO at [address] History  family history includes Cancer in her mother; Heart Disease in her father and sister; Stroke in her father and sister. Social History:  reports that she quit smoking about 20 months ago. Her smoking use included cigarettes. She has a 25.00 pack-year smoking history. She has never used smokeless tobacco.   reports no history of alcohol use. ALLERGIES:  Patient is allergic to latex and codeine.   Continuous Infusions:   sodium chloride       Scheduled Meds:   [START ON 6/3/2021] predniSONE  30 mg Oral Daily    atorvastatin  20 mg Oral Nightly    [Held by provider] budesonide  0.5 mg Nebulization BID    clopidogrel  75 mg Oral Daily    docusate sodium  100 mg Oral BID    famotidine  20 mg Oral Daily    losartan  25 mg Oral Daily    metoprolol tartrate  25 mg Oral BID    sertraline  50 mg Oral Daily    sodium chloride flush  5-40 mL Intravenous 2 times per day    enoxaparin  40 mg Subcutaneous Daily    doxycycline hyclate  100 mg Oral 2 times per day    ipratropium-albuterol  3 mL Inhalation Q4H     PRN Meds:  ALPRAZolam, sodium chloride flush, sodium chloride, promethazine **OR** ondansetron, polyethylene glycol, acetaminophen **OR** acetaminophen, albuterol    REVIEW OF SYSTEMS:  Constitutional: Negative for fever  HENT: Negative for sore throat  Eyes: Negative for redness   Respiratory: + dyspnea, cough  Cardiovascular: Negative for chest pain  Gastrointestinal: Negative for vomiting, diarrhea   Genitourinary: Negative for hematuria   Musculoskeletal: Negative for arthralgias   Skin: Negative for rash  Neurological: Negative for syncope  Hematological: Negative for adenopathy  Psychiatric/Behavorial: Negative for anxiety    PHYSICAL EXAM: /79   Pulse 85   Temp 97.6 °F (36.4 °C) (Oral)   Resp 22   Ht 5' 4\" (1.626 m)   Wt 107 lb 1 oz (48.6 kg)   SpO2 98%   BMI 18.38 kg/m²  on 2lpm  Constitutional:  No acute distress. Eyes: PERRL. Conjunctivae anicteric. ENT: Normal nose. Normal tongue. Neck:  Trachea is midline. No thyroid tenderness. Respiratory: No accessory muscle usage. decreased breath sounds. No wheezes. No rales. No Rhonchi. Cardiovascular: Normal S1S2. No digit clubbing. No digit cyanosis. No LE edema. Gastrointestinal: No mass palpated. No tenderness palpated. No umbilical hernia. Lymphatic: No cervical or supraclavicular adenopathy. Skin: No rash on the exposed extremities. No Nodules or induration on exposed extremities. Psychiatric: No anxiety or Agitation. Alert and Oriented to person, place and time.     CBC: Recent Labs     06/01/21  1320 06/02/21  0357   WBC 10.7 5.5   HGB 10.1* 8.8*   HCT 32.2* 28.0*   MCV 89.5 91.2    172     BMP:   Recent Labs     06/01/21  1320 06/02/21  0357    141   K 5.0 3.9    106   CO2 28 28   BUN 19 28*   CREATININE 1.0 1.2        Recent Labs     06/01/21  1320   AST 20   ALT 13   BILITOT 0.4   ALKPHOS 73     No results for input(s): PROTIME, INR in the last 72 hours. No results for input(s): NITRITE, COLORU, PHUR, LABCAST, WBCUA, RBCUA, MUCUS, TRICHOMONAS, YEAST, BACTERIA, CLARITYU, SPECGRAV, LEUKOCYTESUR, UROBILINOGEN, BILIRUBINUR, BLOODU, GLUCOSEU, AMORPHOUS in the last 72 hours. Invalid input(s): KETONESU  No results for input(s): PHART, LFL4MWJ, PO2ART in the last 72 hours. Chest imaging was reviewed by me and showed cxr with clear lungs    ASSESSMENT:  · COPD exacerbation  · Chronichypoxic respiratory failure    PLAN:  Supplemental oxygen to maintain SaO2 >92%; wean as tolerated  Intensive inhaled bronchodilator therapy.    Prednisone taper   Doxycycline   Possible d/c tomorrow    Thank you Yuliana Vargas* for this consult

## 2021-06-02 NOTE — PROGRESS NOTES
Patient admitted to room 313 from ED. Patient oriented to room, call light, bed rails, phone, lights and bathroom. Patient instructed about the schedule of the day including: vital sign frequency, lab draws, possible tests, frequency of MD and staff rounds, daily weights, I &O's and prescribed diet. bed alarm in place, patient aware of placement and reason. Telemetry box in place, patient aware of placement and reason. Bed locked, in lowest position, side rails up 2/4, call light within reach. Recliner Assessment  Patient is not able to demonstrated the ability to move from a reclining position to an upright position within the recliner. however patient is alert, oriented and able to provide informed consent    4 Eyes Skin Assessment     The patient is being assess for   Admission    I agree that 2 RN's have performed a thorough Head to Toe Skin Assessment on the patient. ALL assessment sites listed below have been assessed. Areas assessed for pressure by both nurses:   [x]   Head, Face, and Ears   [x]   Shoulders, Back, and Chest, Abdomen  [x]   Arms, Elbows, and Hands   [x]   Coccyx, Sacrum, and Ischium  [x]   Legs, Feet, and Heels  Scattered bruising, skin tear to LLE from EMS transport      Skin Assessed Under all Medical Devices by both nurses:  O2 device tubing              All Mepilex Borders were peeled back and area peeked at by both nurses:  No: none present  Please list where Mepilex Borders are located:  none present             **SHARE this note so that the co-signing nurse is able to place an eSignature**    Co-signer eSignature: Electronically signed by Abelardo Guerrero RN on 6/2/21 at 12:51 AM EDT    Does the Patient have Skin Breakdown related to pressure?   No              Trey Prevention initiated:  NA   Wound Care Orders initiated:  NA      [facility] nurse consulted for Pressure Injury (Stage 3,4, Unstageable, DTI, NWPT, Complex wounds)and New or Established Ostomies:  NA      Primary Nurse eSignature: Electronically signed by Malik Sanches RN on 6/2/21 at 12:50 AM LJT

## 2021-06-02 NOTE — H&P
Hospital Medicine History & Physical      PCP: Rosaura Zavala MD    Date of Admission: 6/1/2021    Date of Service: Pt seen/examined on 6//2021 and Admitted to Inpatient with expected LOS greater than two midnights due to medical therapy. *     Chief Complaint:  SOB      History Of Present Illness:  71f with copd on 2 lpm NC O2 at home, presents with a 2 week history of increasing SOB. She denies fever, chills, notes onset of a cough productive of white phelgm 2 days ago. She was noted to be hypoxemic 2 days ago as well, requiring an increase in her NC O2 to 3 lpm. She denies chest pain, lower extremity edema, orthopnea. ZENDEJAS progressed prompting ER consultation. Currently patient is seen on NC O2, no distress, awake, alert, oriented, denying worsening sob. Past Medical History:          Diagnosis Date    [facility]) [facility] [address]     Asthma     COPD [facility]chronic obstructive pulmonary disease) ([facility] [address]     Hyperlipidemia     Hypertension     MRSA (methicillin resistant staph aureus) culture positive 09/22/2019    + resp cx    OA (osteoarthritis) 8/12/2014       Past Surgical History:          Procedure Laterality Date    BRONCHOSCOPY  09/08/2019    Dr. Connor Goodell - w/BAL   [address]  09/05/2019    Dr. Sabra Zendejas 2/24/2020    COLONOSCOPY DIAGNOSTIC performed by Wesley Mir DO at [address] [place] N/A 9/19/2019    OFF PUMP CORONARY ARTERY BYPASS GRAFTING X2, INTERNAL MAMMARY ARTERY, SAPHENOUS VEIN GRAFT performed by Armani Mcdowell MD at [address] , PARTIAL Left     SIGMOIDOSCOPY  02/27/2020    4 bands    SIGMOIDOSCOPY N/A 2/27/2020    FLEX SIG W/ BANDING SIGMOIDOSCOPY DIAGNOSTIC FLEXIBLE performed by Wesley Mir DO at [address]       Medications Prior to Admission:      Prior to Admission medications    Medication Sig Start Date End Date Taking?  Authorizing Provider pantoprazole (PROTONIX) 20 MG tablet Take 20 mg by mouth daily    Historical Provider, MD   furosemide (LASIX) 20 MG tablet Take 20 mg by mouth daily mon wed fri    Historical Provider, MD   ferrous sulfate (IRON 325) 325 (65 Fe) MG tablet Take 325 mg by mouth daily (with breakfast)    Historical Provider, MD   potassium chloride (KLOR-CON) 20 MEQ packet Take 20 mEq by mouth daily    Historical Provider, MD   Roflumilast (DALIRESP) 250 MCG tablet Take 250 mcg by mouth daily    Historical Provider, MD   acetaminophen (TYLENOL) 500 MG tablet Take 500 mg by mouth every 6 hours as needed for Pain    Historical Provider, MD   sertraline (ZOLOFT) 50 MG tablet Take 50 mg by mouth daily    Historical Provider, MD   ALPRAZolam (XANAX) 0.25 MG tablet Take 0.25 mg by mouth 3 times daily as needed for Sleep.     Historical Provider, MD   budesonide (PULMICORT) 0.5 MG/2ML nebulizer suspension Take 2 mLs by nebulization 2 times daily 9/24/19   Helen Godfrey MD   ipratropium-albuterol (DUONEB) 0.5-2.5 (3) MG/3ML SOLN nebulizer solution Inhale 3 mLs into the lungs every 6 hours 9/24/19   Helen Godfrey MD   atorvastatin (LIPITOR) 20 MG tablet Take 1 tablet by mouth nightly 9/24/19   Helen Godfrey MD   metoprolol tartrate (LOPRESSOR) 25 MG tablet Take 1 tablet by mouth 2 times daily 9/24/19   Helen Godfrey MD   docusate sodium (COLACE, DULCOLAX) 100 MG CAPS Take 100 mg by mouth 2 times daily 9/24/19   Helen Godfrey MD   losartan (COZAAR) 25 MG tablet Take 25 mg by mouth daily    Historical Provider, MD   clopidogrel (PLAVIX) 75 MG tablet Take 1 tablet by mouth daily 9/7/19   Luci Morrison MD   tiotropium (Ludger Keen) 18 MCG inhalation capsule Inhale 18 mcg into the lungs daily 7/25/16   Historical Provider, MD   budesonide-formoterol (SYMBICORT) 160-4.5 MCG/ACT AERO Inhale 2 puffs into the lungs 2 times daily    Historical Provider, MD   albuterol (PROVENTIL HFA) 108 (90 BASE) MCG/ACT inhaler Inhale 2 puffs into the No results for input(s): INR in the last 72 hours. Recent Labs     06/01/21  1320 06/01/21  1540   TROPONINI 0.04* 0.04*       Urinalysis:      Lab Results   Component Value Date    NITRU Negative 09/18/2020    WBCUA 3-5 09/18/2020    BACTERIA Rare 09/18/2020    RBCUA 0-2 09/18/2020    BLOODU Negative 09/18/2020    SPECGRAV 1.020 09/18/2020    GLUCOSEU Negative 09/18/2020       Radiology:          XR CHEST (2 VW)   Final Result   No radiographic evidence of acute pulmonary disease. ASSESSMENT:    Active Hospital Problems    Diagnosis Date Noted    COPD exacerbation ([facility] [address] [J44.1] 12/29/2017         PLAN:    1) COPD exac  - systemic steroids, HHN    2) Acute bronchitis  - doxy / rocephin    3) trop  - remains flat, chronically elevated    DVT Prophylaxis: lovenx  Diet: DIET GENERAL;  Code Status: Full Code               Ricard Mcardle, MD    Thank you Dewey Person MD for the opportunity to be involved in this patient's care. If you have any questions or concerns please feel free to contact me at [phone].

## 2021-06-02 NOTE — ACP (ADVANCE CARE PLANNING)
Pt declined to designate a decision maker if she was unable to make her medical decisions. She stated that it would be her children as they are her next of kin.  Her granddaughter at bedside stated pt doesn't have POA

## 2021-06-02 NOTE — PROGRESS NOTES
Progress Note    Admit Date:  6/1/2021    79year old female with COPD presented with hypoxia and worsening shortness of breath. Admitted for COPD exacerbation. Subjective:  Ms. Edrie Severance states she is not feeling well. She feels shaky/jittery. Continues to feel short of breath. She does wear O2 at home. 2 L at baseline. Objective:   Patient Vitals for the past 4 hrs:   BP Temp Temp src Pulse Resp SpO2 Weight   06/02/21 0704 105/79 97.6 °F (36.4 °C) Oral 85 22 98 % --   06/02/21 0527 -- -- -- -- -- -- 107 lb 1 oz (48.6 kg)            Intake/Output Summary (Last 24 hours) at 6/2/2021 0830  Last data filed at 6/2/2021 4848  Gross per 24 hour   Intake --   Output 100 ml   Net -100 ml       Physical Exam:  Gen: No distress. Alert. Eyes: PERRL. No sclera icterus. No conjunctival injection. ENT: No discharge. Pharynx clear. Neck: Trachea midline. Resp: + accessory muscle use. No crackles. No wheezes. No rhonchi. LS diminished. CV: Regular rate. Regular rhythm. No murmur. No rub. No edema. Capillary Refill: Brisk,< 3 seconds   Peripheral Pulses: +2 palpable, equal bilaterally   GI: Non-tender. Non-distended. Normal bowel sounds. No hernia. Skin: Warm and dry. No nodule on exposed extremities. No rash on exposed extremities. M/S: No cyanosis. No joint deformity. No clubbing. Neuro: Awake. Grossly nonfocal    Psych: Oriented x 3. No anxiety or agitation      I Rafael Hensley MD have reviewed the chart on [facility] and personally interviewed and examined patient, reviewed the data (labs and imaging) and after discussion with my PA formulated the plan. Agree with note with the following edits. Physical exam:    /79   Pulse 85   Temp 97.6 °F (36.4 °C) (Oral)   Resp 22   Ht 5' 4\" (1.626 m)   Wt 107 lb 1 oz (48.6 kg)   SpO2 98%   BMI 18.38 kg/m²     Gen: No distress. Alert. Eyes: PERRL. No sclera icterus. No conjunctival injection. ENT: No discharge.  Pharynx Prophylaxis: Lovenox  Diet: DIET GENERAL;  Code Status: Full Code    Yodit Blair NEWTONP-C  6/2/2021      Agree with above  Edits made     HECTOR Villagomez.

## 2021-06-02 NOTE — PROGRESS NOTES
Family member Emily Dumont) called in requesting information. Family member is not on emergency contact list and pt requests that we do not release information to this person.  Adriana Patel RN

## 2021-06-03 LAB
ANION GAP SERPL CALCULATED.3IONS-SCNC: 6 MMOL/L (ref 3–16)
BUN BLDV-MCNC: 37 MG/DL (ref 7–20)
CALCIUM SERPL-MCNC: 9.5 MG/DL (ref 8.3–10.6)
CHLORIDE BLD-SCNC: 104 MMOL/L (ref 99–110)
CO2: 29 MMOL/L (ref 21–32)
CREAT SERPL-MCNC: 1.2 MG/DL (ref 0.6–1.2)
GFR AFRICAN AMERICAN: 52
GFR NON-AFRICAN AMERICAN: 43
GLUCOSE BLD-MCNC: 119 MG/DL (ref 70–99)
HCT VFR BLD CALC: 30.5 % (ref 36–48)
HEMOGLOBIN: 9.6 G/DL (ref 12–16)
MCH RBC QN AUTO: 28.2 PG (ref 26–34)
MCHC RBC AUTO-ENTMCNC: 31.5 G/DL (ref 31–36)
MCV RBC AUTO: 89.5 FL (ref 80–100)
PDW BLD-RTO: 15.4 % (ref 12.4–15.4)
PLATELET # BLD: 216 K/UL (ref 135–450)
PMV BLD AUTO: 8.9 FL (ref 5–10.5)
POTASSIUM REFLEX MAGNESIUM: 3.9 MMOL/L (ref 3.5–5.1)
RBC # BLD: 3.41 M/UL (ref 4–5.2)
SODIUM BLD-SCNC: 139 MMOL/L (ref 136–145)
WBC # BLD: 9.6 K/UL (ref 4–11)

## 2021-06-03 PROCEDURE — 2700000000 HC OXYGEN THERAPY PER DAY

## 2021-06-03 PROCEDURE — 2580000003 HC RX 258: Performed by: INTERNAL MEDICINE

## 2021-06-03 PROCEDURE — 94761 N-INVAS EAR/PLS OXIMETRY MLT: CPT

## 2021-06-03 PROCEDURE — 6370000000 HC RX 637 (ALT 250 FOR IP): Performed by: NURSE PRACTITIONER

## 2021-06-03 PROCEDURE — 99233 SBSQ HOSP IP/OBS HIGH 50: CPT | Performed by: INTERNAL MEDICINE

## 2021-06-03 PROCEDURE — 94640 AIRWAY INHALATION TREATMENT: CPT

## 2021-06-03 PROCEDURE — 6370000000 HC RX 637 (ALT 250 FOR IP): Performed by: INTERNAL MEDICINE

## 2021-06-03 PROCEDURE — 80048 BASIC METABOLIC PNL TOTAL CA: CPT

## 2021-06-03 PROCEDURE — 99232 SBSQ HOSP IP/OBS MODERATE 35: CPT | Performed by: INTERNAL MEDICINE

## 2021-06-03 PROCEDURE — 2060000000 HC ICU INTERMEDIATE R&B

## 2021-06-03 PROCEDURE — 36415 COLL VENOUS BLD VENIPUNCTURE: CPT

## 2021-06-03 PROCEDURE — 2580000003 HC RX 258: Performed by: NURSE PRACTITIONER

## 2021-06-03 PROCEDURE — 6360000002 HC RX W HCPCS: Performed by: NURSE PRACTITIONER

## 2021-06-03 PROCEDURE — 85027 COMPLETE CBC AUTOMATED: CPT

## 2021-06-03 PROCEDURE — 6360000002 HC RX W HCPCS: Performed by: INTERNAL MEDICINE

## 2021-06-03 RX ORDER — BUSPIRONE HYDROCHLORIDE 10 MG/1
5 TABLET ORAL 3 TIMES DAILY PRN
Status: ON HOLD | COMMUNITY
End: 2021-09-27 | Stop reason: HOSPADM

## 2021-06-03 RX ORDER — BUSPIRONE HYDROCHLORIDE 10 MG/1
10 TABLET ORAL EVERY 8 HOURS PRN
Status: DISCONTINUED | OUTPATIENT
Start: 2021-06-03 | End: 2021-06-04 | Stop reason: HOSPADM

## 2021-06-03 RX ADMIN — DOCUSATE SODIUM 100 MG: 100 CAPSULE, LIQUID FILLED ORAL at 21:23

## 2021-06-03 RX ADMIN — ONDANSETRON 4 MG: 2 INJECTION INTRAMUSCULAR; INTRAVENOUS at 08:42

## 2021-06-03 RX ADMIN — IPRATROPIUM BROMIDE AND ALBUTEROL SULFATE 3 ML: .5; 3 SOLUTION RESPIRATORY (INHALATION) at 19:00

## 2021-06-03 RX ADMIN — BUSPIRONE HYDROCHLORIDE 10 MG: 10 TABLET ORAL at 21:23

## 2021-06-03 RX ADMIN — Medication 10 ML: at 08:43

## 2021-06-03 RX ADMIN — FAMOTIDINE 20 MG: 20 TABLET ORAL at 08:40

## 2021-06-03 RX ADMIN — SODIUM CHLORIDE 10 ML: 9 INJECTION, SOLUTION INTRAVENOUS at 13:03

## 2021-06-03 RX ADMIN — Medication 10 ML: at 21:24

## 2021-06-03 RX ADMIN — ATORVASTATIN CALCIUM 20 MG: 10 TABLET, FILM COATED ORAL at 21:23

## 2021-06-03 RX ADMIN — METOPROLOL TARTRATE 25 MG: 25 TABLET, FILM COATED ORAL at 08:41

## 2021-06-03 RX ADMIN — PREDNISONE 30 MG: 20 TABLET ORAL at 08:41

## 2021-06-03 RX ADMIN — DOXYCYCLINE HYCLATE 100 MG: 100 TABLET, COATED ORAL at 08:40

## 2021-06-03 RX ADMIN — IPRATROPIUM BROMIDE AND ALBUTEROL SULFATE 3 ML: .5; 3 SOLUTION RESPIRATORY (INHALATION) at 13:26

## 2021-06-03 RX ADMIN — SERTRALINE HYDROCHLORIDE 50 MG: 50 TABLET ORAL at 08:41

## 2021-06-03 RX ADMIN — IRON SUCROSE 100 MG: 20 INJECTION, SOLUTION INTRAVENOUS at 13:05

## 2021-06-03 RX ADMIN — LOSARTAN POTASSIUM 25 MG: 25 TABLET, FILM COATED ORAL at 08:40

## 2021-06-03 RX ADMIN — ALPRAZOLAM 0.25 MG: 0.25 TABLET ORAL at 05:32

## 2021-06-03 RX ADMIN — DOXYCYCLINE HYCLATE 100 MG: 100 TABLET, COATED ORAL at 21:23

## 2021-06-03 ASSESSMENT — PAIN SCALES - GENERAL: PAINLEVEL_OUTOF10: 0

## 2021-06-03 ASSESSMENT — PAIN - FUNCTIONAL ASSESSMENT: PAIN_FUNCTIONAL_ASSESSMENT: FACES

## 2021-06-03 NOTE — PROGRESS NOTES
Patient is resting comfortably in bed and denies further needs at this time. Call light within reach.

## 2021-06-03 NOTE — PROGRESS NOTES
Bedside report and transfer of care given to [facility]. Pt currently resting in bed with the call light within reach. Pt denies any other care needs at this time. Pt stable at this time.

## 2021-06-03 NOTE — PROGRESS NOTES
Progress Note    Admit Date:  6/1/2021    79year old female with COPD presented with hypoxia and worsening shortness of breath. Admitted for COPD exacerbation. Subjective:  Ms. Raheem Ceja states she is not feeling well. Not shaky as yesterday  Feels anxious     Objective:   Patient Vitals for the past 4 hrs:   BP Temp Temp src Pulse Resp SpO2   06/03/21 0815 134/80 96.8 °F (36 °C) Oral 90 22 99 %   06/03/21 0529 115/69 97.7 °F (36.5 °C) Axillary 85 24 98 %            Intake/Output Summary (Last 24 hours) at 6/3/2021 0905  Last data filed at 6/3/2021 0530  Gross per 24 hour   Intake 360 ml   Output 150 ml   Net 210 ml       Physical Exam:  Gen: No distress. Alert. Eyes: PERRL. No sclera icterus. No conjunctival injection. ENT: No discharge. Pharynx clear. Neck: Trachea midline. Resp: + accessory muscle use. No crackles. mild wheezes. No rhonchi. LS diminished. CV: Regular rate. Regular rhythm. No murmur. No rub. No edema. Capillary Refill: Brisk,< 3 seconds   Peripheral Pulses: +2 palpable, equal bilaterally   GI: Non-tender. Non-distended. Normal bowel sounds. No hernia. Skin: Warm and dry. No nodule on exposed extremities. No rash on exposed extremities. M/S: No cyanosis. No joint deformity. No clubbing. Neuro: Awake. Grossly nonfocal    Psych: Oriented x 3. No anxiety or agitation      Data:  CBC:   Recent Labs     06/01/21  1320 06/02/21  0357 06/03/21  0420   WBC 10.7 5.5 9.6   HGB 10.1* 8.8* 9.6*   HCT 32.2* 28.0* 30.5*   MCV 89.5 91.2 89.5    172 216     BMP:   Recent Labs     06/01/21  1320 06/02/21  0357 06/03/21  0420    141 139   K 5.0 3.9 3.9    106 104   CO2 28 28 29   BUN 19 28* 37*   CREATININE 1.0 1.2 1.2     LIVER PROFILE:   Recent Labs     06/01/21  1320   AST 20   ALT 13   BILITOT 0.4   ALKPHOS 73     PT/INR: No results for input(s): PROTIME, INR in the last 72 hours.     CULTURES  COVID/Influenza: not detected  Blood: pending    RADIOLOGY  XR CHEST (2 VW) Final Result   No radiographic evidence of acute pulmonary disease. Assessment/Plan:  COPD exacerbation  Chronic hypoxic respiratory failure   - admitted for further management/care. Pulmonology consulted. - continue Pulmicort, Albuterol  - IV Solu-medrol. decrease to prednisone 30 mg daily   - Doxycycline,    Elevated troponin   - 0.04, 0.04, 0.02.    - non-specific T wave abnormality on EKG. No ACS    Hypertension  - BP Borderline. On Losartan, Metoprolol. Hyperlipidemia   - on Atorvastatin. Normocytic anemia   - Hgb 10.1-->8.8--9.6  H/HCT-11.9/36.9  - monitor CBC  Hemoccult stools  check iron studies - iron def   H/o lower GI bleed 2/2020- colonoscopy- poor prep- no blood seen  flex sig 2/27/2020- 4 bands placed on hemorrhoids   Start IV iron  Will need oral iron at [place]   Outpatient GI follow up- Dr. Roxanne Adam    CAD, S/p CABG  - on Plavix, Atorvastatin, Metoprolol. GERD  - continue pepcid. CKD stage 3  - stable. Monitor labs. DVT Prophylaxis: Lovenox  Diet: ADULT DIET; Regular  Code Status: Full Code      HECTOR Villagomez.

## 2021-06-03 NOTE — PROGRESS NOTES
Shift assessment completed. See flow sheet. Medications given. Patient is A&O x4. Patient states she feels sick and doesn't know if its anxiety or what. She states she has nausea and her shoulders and back hurt. We discussed talking with the doctor this morning and describing what she is feeling. Patient denies any further needs at this time. Call light within reach. Will continue to monitor.

## 2021-06-03 NOTE — PROGRESS NOTES
1.2       Chest imaging was reviewed by me and showed cxr with clear lungs     ASSESSMENT:  · COPD exacerbation  · Chronichypoxic respiratory failure  · Anxiety     PLAN:  · Supplemental oxygen to maintain SaO2 >92%; wean as tolerated  · Intensive inhaled bronchodilator therapy.    · Prednisone taper   · Doxycycline   · PT/OT

## 2021-06-03 NOTE — PROGRESS NOTES
Shift assessment complete. Scheduled meds given. Call light and bed side table in reach. Will continue to monitor.

## 2021-06-04 VITALS
BODY MASS INDEX: 18.65 KG/M2 | WEIGHT: 109.25 LBS | OXYGEN SATURATION: 93 % | DIASTOLIC BLOOD PRESSURE: 65 MMHG | TEMPERATURE: 97.6 F | SYSTOLIC BLOOD PRESSURE: 101 MMHG | HEIGHT: 64 IN | RESPIRATION RATE: 18 BRPM | HEART RATE: 80 BPM

## 2021-06-04 LAB
ANION GAP SERPL CALCULATED.3IONS-SCNC: 4 MMOL/L (ref 3–16)
BUN BLDV-MCNC: 44 MG/DL (ref 7–20)
CALCIUM SERPL-MCNC: 9.5 MG/DL (ref 8.3–10.6)
CHLORIDE BLD-SCNC: 107 MMOL/L (ref 99–110)
CO2: 32 MMOL/L (ref 21–32)
CREAT SERPL-MCNC: 1.1 MG/DL (ref 0.6–1.2)
EKG ATRIAL RATE: 90 BPM
EKG DIAGNOSIS: NORMAL
EKG P AXIS: 73 DEGREES
EKG P-R INTERVAL: 132 MS
EKG Q-T INTERVAL: 344 MS
EKG QRS DURATION: 80 MS
EKG QTC CALCULATION (BAZETT): 420 MS
EKG R AXIS: -40 DEGREES
EKG T AXIS: 43 DEGREES
EKG VENTRICULAR RATE: 90 BPM
GFR AFRICAN AMERICAN: 58
GFR NON-AFRICAN AMERICAN: 48
GLUCOSE BLD-MCNC: 85 MG/DL (ref 70–99)
HCT VFR BLD CALC: 30 % (ref 36–48)
HEMOGLOBIN: 9.2 G/DL (ref 12–16)
LACTIC ACID: 1.1 MMOL/L (ref 0.4–2)
MCH RBC QN AUTO: 28 PG (ref 26–34)
MCHC RBC AUTO-ENTMCNC: 30.7 G/DL (ref 31–36)
MCV RBC AUTO: 91.3 FL (ref 80–100)
OCCULT BLOOD DIAGNOSTIC: NORMAL
PDW BLD-RTO: 15.7 % (ref 12.4–15.4)
PLATELET # BLD: 210 K/UL (ref 135–450)
PMV BLD AUTO: 8.4 FL (ref 5–10.5)
POTASSIUM REFLEX MAGNESIUM: 5.2 MMOL/L (ref 3.5–5.1)
RBC # BLD: 3.29 M/UL (ref 4–5.2)
SODIUM BLD-SCNC: 143 MMOL/L (ref 136–145)
TROPONIN: 0.01 NG/ML
WBC # BLD: 8.1 K/UL (ref 4–11)

## 2021-06-04 PROCEDURE — 36415 COLL VENOUS BLD VENIPUNCTURE: CPT

## 2021-06-04 PROCEDURE — 97535 SELF CARE MNGMENT TRAINING: CPT

## 2021-06-04 PROCEDURE — 6370000000 HC RX 637 (ALT 250 FOR IP): Performed by: INTERNAL MEDICINE

## 2021-06-04 PROCEDURE — 6370000000 HC RX 637 (ALT 250 FOR IP): Performed by: NURSE PRACTITIONER

## 2021-06-04 PROCEDURE — 80048 BASIC METABOLIC PNL TOTAL CA: CPT

## 2021-06-04 PROCEDURE — 83605 ASSAY OF LACTIC ACID: CPT

## 2021-06-04 PROCEDURE — 97166 OT EVAL MOD COMPLEX 45 MIN: CPT

## 2021-06-04 PROCEDURE — 99239 HOSP IP/OBS DSCHRG MGMT >30: CPT | Performed by: INTERNAL MEDICINE

## 2021-06-04 PROCEDURE — 6360000002 HC RX W HCPCS: Performed by: INTERNAL MEDICINE

## 2021-06-04 PROCEDURE — 85027 COMPLETE CBC AUTOMATED: CPT

## 2021-06-04 PROCEDURE — 2580000003 HC RX 258: Performed by: INTERNAL MEDICINE

## 2021-06-04 PROCEDURE — 97530 THERAPEUTIC ACTIVITIES: CPT

## 2021-06-04 PROCEDURE — 6360000002 HC RX W HCPCS: Performed by: NURSE PRACTITIONER

## 2021-06-04 PROCEDURE — 97162 PT EVAL MOD COMPLEX 30 MIN: CPT

## 2021-06-04 PROCEDURE — 99232 SBSQ HOSP IP/OBS MODERATE 35: CPT | Performed by: INTERNAL MEDICINE

## 2021-06-04 PROCEDURE — G0328 FECAL BLOOD SCRN IMMUNOASSAY: HCPCS

## 2021-06-04 PROCEDURE — 93010 ELECTROCARDIOGRAM REPORT: CPT | Performed by: INTERNAL MEDICINE

## 2021-06-04 PROCEDURE — 93005 ELECTROCARDIOGRAM TRACING: CPT | Performed by: HOSPITALIST

## 2021-06-04 PROCEDURE — 94640 AIRWAY INHALATION TREATMENT: CPT

## 2021-06-04 PROCEDURE — 84484 ASSAY OF TROPONIN QUANT: CPT

## 2021-06-04 PROCEDURE — 2580000003 HC RX 258: Performed by: NURSE PRACTITIONER

## 2021-06-04 RX ORDER — DOXYCYCLINE HYCLATE 100 MG
100 TABLET ORAL EVERY 12 HOURS SCHEDULED
Qty: 6 TABLET | Refills: 0 | Status: SHIPPED | OUTPATIENT
Start: 2021-06-04 | End: 2021-06-07

## 2021-06-04 RX ORDER — PREDNISONE 10 MG/1
TABLET ORAL
Qty: 18 TABLET | Refills: 0 | Status: ON HOLD | OUTPATIENT
Start: 2021-06-04 | End: 2021-09-03 | Stop reason: HOSPADM

## 2021-06-04 RX ORDER — POLYETHYLENE GLYCOL 3350 17 G/17G
17 POWDER, FOR SOLUTION ORAL DAILY
Status: DISCONTINUED | OUTPATIENT
Start: 2021-06-04 | End: 2021-06-04 | Stop reason: HOSPADM

## 2021-06-04 RX ADMIN — FAMOTIDINE 20 MG: 20 TABLET ORAL at 09:41

## 2021-06-04 RX ADMIN — SODIUM CHLORIDE 25 ML: 9 INJECTION, SOLUTION INTRAVENOUS at 09:47

## 2021-06-04 RX ADMIN — CLOPIDOGREL BISULFATE 75 MG: 75 TABLET ORAL at 09:42

## 2021-06-04 RX ADMIN — ACETAMINOPHEN 650 MG: 325 TABLET ORAL at 07:46

## 2021-06-04 RX ADMIN — PREDNISONE 30 MG: 20 TABLET ORAL at 09:41

## 2021-06-04 RX ADMIN — POLYETHYLENE GLYCOL (3350) 17 G: 17 POWDER, FOR SOLUTION ORAL at 09:57

## 2021-06-04 RX ADMIN — DOXYCYCLINE HYCLATE 100 MG: 100 TABLET, COATED ORAL at 09:42

## 2021-06-04 RX ADMIN — IPRATROPIUM BROMIDE AND ALBUTEROL SULFATE 3 ML: .5; 3 SOLUTION RESPIRATORY (INHALATION) at 07:39

## 2021-06-04 RX ADMIN — DOCUSATE SODIUM 100 MG: 100 CAPSULE, LIQUID FILLED ORAL at 09:42

## 2021-06-04 RX ADMIN — ENOXAPARIN SODIUM 40 MG: 40 INJECTION SUBCUTANEOUS at 09:43

## 2021-06-04 RX ADMIN — SERTRALINE HYDROCHLORIDE 50 MG: 50 TABLET ORAL at 09:42

## 2021-06-04 RX ADMIN — METOPROLOL TARTRATE 25 MG: 25 TABLET, FILM COATED ORAL at 09:42

## 2021-06-04 RX ADMIN — Medication 10 ML: at 09:44

## 2021-06-04 RX ADMIN — ALBUTEROL SULFATE 2.5 MG: 2.5 SOLUTION RESPIRATORY (INHALATION) at 05:19

## 2021-06-04 RX ADMIN — IRON SUCROSE 100 MG: 20 INJECTION, SOLUTION INTRAVENOUS at 09:47

## 2021-06-04 ASSESSMENT — PAIN SCALES - GENERAL
PAINLEVEL_OUTOF10: 0
PAINLEVEL_OUTOF10: 6
PAINLEVEL_OUTOF10: 0

## 2021-06-04 NOTE — PLAN OF CARE
Problem: Nutritional:  Goal: Nutritional status will improve  Description: Nutritional status will improve  Outcome: Ongoing     Problem: Respiratory:  Goal: Ability to maintain normal respiratory secretions will improve  Description: Ability to maintain normal respiratory secretions will improve  Outcome: Ongoing     Problem: Falls - Risk of:  Goal: Will remain free from falls  Description: Will remain free from falls  Outcome: Ongoing

## 2021-06-04 NOTE — DISCHARGE SUMMARY
tablet by mouth nightly     budesonide 0.5 MG/2ML nebulizer suspension  Commonly known as: PULMICORT  Take 2 mLs by nebulization 2 times daily     busPIRone 10 MG tablet  Commonly known as: BUSPAR     clopidogrel 75 MG tablet  Commonly known as: PLAVIX  Take 1 tablet by mouth daily     Daliresp 250 MCG tablet  Generic drug: Roflumilast     docusate 100 MG Caps  Commonly known as: COLACE, DULCOLAX  Take 100 mg by mouth 2 times daily     ferrous sulfate 325 (65 Fe) MG tablet  Commonly known as: IRON 325     furosemide 20 MG tablet  Commonly known as: LASIX     ipratropium-albuterol 0.5-2.5 (3) MG/3ML Soln nebulizer solution  Commonly known as: DUONEB  Inhale 3 mLs into the lungs every 6 hours     metoprolol tartrate 25 MG tablet  Commonly known as: LOPRESSOR  Take 1 tablet by mouth 2 times daily     pantoprazole 20 MG tablet  Commonly known as: PROTONIX     potassium chloride 20 MEQ packet  Commonly known as: KLOR-CON     sertraline 50 MG tablet  Commonly known as: ZOLOFT     Spiriva HandiHaler 18 MCG inhalation capsule  Generic drug: tiotropium     Symbicort 160-4.5 MCG/ACT Aero  Generic drug: budesonide-formoterol        STOP taking these medications    ALPRAZolam 0.25 MG tablet  Commonly known as: XANAX     losartan 25 MG tablet  Commonly known as: COZAAR           Where to Get Your Medications      These medications were sent to [facility]    Phone: [phone]   · doxycycline hyclate 100 MG tablet  · predniSONE 10 MG tablet           Discharged in stable condition to home with 24 hr supervision     Follow Up: Follow up with PCP in 1 week, GI for anemia work up     GIOVANNY Hernandez M.D.

## 2021-06-04 NOTE — PROGRESS NOTES
Inpatient Occupational Therapy  Evaluation and Treatment    Unit: PCU  Date:  6/4/2021  Patient Name:    Kirsty Mosley  Admitting diagnosis:  COPD exacerbation ([facility] [address] [J44.1]  Admit Date:  6/1/2021  Precautions/Restrictions/WB Status/ Lines/ Wounds/ Oxygen: fall risk, bed/chair alarm, purewick catheter, supplemental O2 (2L), telemetry and continuous pulse ox    Treatment Time:  0825-0923  Treatment Number: 1     Billable Treatment Time: 48 minutes   Total Treatment Time:   58   minutes    Patient Goals for Therapy:  \" to go back home and not have anxiety attacks \"      Discharge Recommendations: SNF, if pt chooses to D/C home, recommend 24/7 assist and home OT  DME needs for discharge: Needs Met       Therapy recommendations for staff:   Assist of 1 with use of rolling walker (RW) for all transfers to/from BSC/chair    History of Present Illness: Per Dr. Neo Jones H&P 6/01: \"55E with copd on 2 lpm NC O2 at home, presents with a 2 week history of increasing SOB. She denies fever, chills, notes onset of a cough productive of white phelgm 2 days ago. She was noted to be hypoxemic 2 days ago as well, requiring an increase in her NC O2 to 3 lpm. She denies chest pain, lower extremity edema, orthopnea. ZENDEJAS progressed prompting ER consultation. Currently patient is seen on NC O2, no distress, awake, alert, oriented, denying worsening sob. \"  PMHx including not limited to: COPD, CAD s/p CABG, GERD, HLD, HTN    Home Health S4 Level Recommendation:  Level 3 Safety  AM-PAC Score:      Preadmission Environment    Pt. Lives Alone - son or other family member stays with pt at night; during the day family is \"in and out all the time\".   Home environment:            one story home  Steps to enter first floor: 1 steps to enter and no handrail (doorway)  Steps to second floor:         N/A  Bathroom: tub/shower unit and standard height commode  Equipment owned: SW, rollator, wc (manual), shower chair/bench, BSC, SPC, lift chair, hospital bed, home O2 (2L) continous, pulse ox, reacher and lifealert     Preadmission Status:  Pt. Able to drive: No (family drives as needed)  Pt Fully independent with ADLs: No  Pt. Required assistance from family for: Bathing, Cleaning, Cooking, Dressing and Laundry (family always helps with bathing, laundry, cleaning; sometimes helps with dressing \"if I need it\")  Pt. independent for transfers and gait and walked with No Device within the house. Outside the house pt uses w/c with family pushing. History of falls          Yes     Pain   Yes  Location: chest  Ratin /10  Pain Medicine Status: Received pain med prior to tx (\"it hasn't helped yet\")     Cognition    A&O Person, Place and Situation   Able to follow 2 step commands     Subjective  Patient lying supine in bed with no family present. Granddaughter arrives midway through treatment. Pt agreeable to this OT eval & tx. Upper Extremity ROM:    WFL    Upper Extremity Strength:    BUE strength impaired but not formally assessed w/ MMT    Upper Extremity Sensation    NT    Upper Extremity Proprioception:  WFL    Coordination and Tone  Impaired,noted to have some tremor in hands occasionally    Balance  Functional Sitting Balance:  Impaired SBA, sat at EOB for approximately 30 min  Functional Standing Balance:Impaired Min A several posterior LOB while standing, however patient declined use of RW.      Bed mobility:    Supine to sit:   Supervision  Sit to supine:   Not Tested  Rolling:    Not Tested  Scooting in sitting:  Supervision  Scooting to head of bed:   Supervision    Bridging:   Not Tested    Transfers:    Sit to stand:  CGA   Stand to sit:  CGA  Bed to chair:   CGA/Min A without AD (Pt declined use of RW)  Standard toilet: Not Tested  Bed to [facility]:  Not Tested    Dressing:      UE:   Not Tested  LE:    SBA, socks    Bathing:    UE:  Not Tested  LE:  Not Tested    Eating:   Not Tested    Toileting:  Not Tested    Activity Tolerance   Pt completed therapy session with SOB noted with activity     BP (mmHg)  HR (bpm)  SpO2 (%)    Supine before activity   99/62  89  95%    EOB - lightheaded  89/58  78  89-91%    Seated after ambulation  96/60  76 91%     Positioning Needs:   Up in chair, call light and needs in reach. Alarm Set    Exercise / Activities Initiated:   N/A    Patient/Family Education:   Role of OT  Recommendations for DC  Use of AE  Energy conservation techniques    Assessment of Deficits: Pt seen for Occupational therapy evaluation in acute care setting. Pt demonstrated decreased Activity tolerance, ADLs, IADLs, Balance , Safety Awareness, Strength, Transfers and Coping Skills. Pt functioning below baseline and will likely benefit from skilled occupational therapy services to maximize safety and independence. Goal(s) : To be met in 3 Visits:  1). Bed to toilet/BSC: SBA    To be met in 5 Visits:  1). Supine to/from Sit:  Independent  2). Upper Body Bathing:   SBA  3). Lower Body Bathing:   SBA  4). Upper Body Dressing:  SBA  5). Lower Body Dressing:  SBA  6). Pt to demonstrate UE exs x 15 reps with minimal cues    Rehabilitation Potential:  Good for goals listed above. Strengths for achieving goals include: PLOF and Family Support  Barriers to achieving goals include:  Complexity of condition and Weakness     Plan: To be seen 3-5 x/wk while in acute care setting for therapeutic exercises, bed mobility, transfers, dressing, bathing, family/patient education, ADL/IADL retraining, energy conservation training.      Kianna Khan, OTR/L [id number]        If patient discharges from this facility prior to next visit, this note will serve as the Discharge Summary

## 2021-06-04 NOTE — DISCHARGE INSTR - COC
Continuity of Care Form    Patient Name: Uzma Fuentes   :  [id number]  MRN:  [id number]    Admit date:  2021  Discharge date:  2021    Code Status Order: Full Code   Advance Directives:   [facility] Documentation     Date/Time Healthcare Directive Type of Healthcare Directive Copy in [facility] [address] Agent's Name Healthcare Agent's Phone Number    21 2224  No, patient does not have an advance directive for healthcare treatment -- -- -- -- --          Admitting Physician:  Analilia Mcdonough MD  PCP: Abby Calderon MD    Discharging Nurse: Misty Tinajero Unit/Room#: /0921-98  Discharging Unit Phone Number: [phone]    Emergency Contact:   Extended Emergency Contact Information  Primary Emergency Contact: [address] of [address] [place] [address] Phone: [phone]  Relation: Child  Secondary Emergency Contact: [address] [facility] Phone: [phone]  Relation: Other    Past Surgical History:  Past Surgical History:   Procedure Laterality Date    BRONCHOSCOPY  2019    Dr. Saintclair Baseman - w/BAL   [address]  2019    Dr. Sara Bronson N/A 2020    COLONOSCOPY DIAGNOSTIC performed by Harshal Denise DO at [address] N/A 2019    OFF PUMP CORONARY ARTERY BYPASS GRAFTING X2, INTERNAL MAMMARY ARTERY, SAPHENOUS VEIN GRAFT performed by Rebeca Smith MD at [address] PARTIAL Left     SIGMOIDOSCOPY  2020    4 bands    SIGMOIDOSCOPY N/A 2020    FLEX SIG W/ BANDING SIGMOIDOSCOPY DIAGNOSTIC FLEXIBLE performed by Harshal Denise DO at [address]       Immunization History:   Immunization History   Administered Date(s) Administered    Influenza Virus Vaccine 10/17/2013    Influenza, Juan Davide Akiran, 6 mo and older, IM, PF (Flulaval, Fluarix) 2019    Influenza, Quadv, IM, PF (6 mo and older Fluzone, Flulaval, Fluarix, and 3 yrs and older Afluria) 10/04/2019    Pneumococcal Conjugate 13-valent ([id number]) 07/19/2017    Pneumococcal Polysaccharide ([id number]) 02/19/2015    Tdap (Boostrix, Adacel) 07/17/2018       Active Problems:  Patient Active Problem List   Diagnosis Code    Hyperlipidemia E78.5    Asthma J45.909    OA (osteoarthritis) M19.90    Tobacco use Z72.0    COPD exacerbation ([facility] [address] J44.1    Seps[facility] A41.9    Abnormal chest x-ray R93.89    COPD with acute exacerbation (HCC) J44.1    Shortness of breath R06.02    Tobacco abuse Z72.0    Pneumonia due to organism J18.9    Moderate malnutrition ([facility]) E44.0    NSTEMI (non-ST elevated myocardial infarction) ([facility]) I21.4    Acute respiratory failure with hypoxia ([facility]) J96.01    Acute pulmonary edema ([facility]) J81.0    Pulmonary infiltrate R91.8    Elevated brain natriuretic peptide (BNP) level R79.89    Leukocytosis D72.829    Ischemic cardiomyopathy I25.5    Acute combined systolic and diastolic congestive heart failure ([facility]) I50.41    Hypernatremia E87.0    NADJA (acute kidney injury) ([facility] [address] N17.9    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    Mucus plugging of bronchi J98.09    CAD in native artery I25.10    S/P CABG (coronary artery bypass graft) Z95.1    Pneumonia of both lower lobes due to methicillin resistant Staphylococcus aureus (MRSA) ([facility] J15.212    GI bleed K92.2    Severe malnutrition ([facility]) E43    Chest pain R07.9    Chronic respiratory failure with hypoxia ([facility]) J96.11    Essential hypertension I10    Anemia D64.9       Isolation/Infection:   Isolation          No Isolation        Patient Infection Status     Infection Onset Added Last Indicated Last Indicated By Review Planned Expiration Resolved Resolved By    None active    Resolved    COVID-19 Rule Out 06/01/21 06/01/21 06/01/21 COVID-19 & Influenza Combo (Ordered)   06/01/21 Rule-Out Test Resulted    MRSA 09/22/19 09/25/19 09/22/19 Respiratory Culture   06/01/21 Lauryn Styles RN          Nurse Assessment:  Last Vital Signs: /65   Pulse 80   Temp 97.6 °F (36.4 °C) (Oral)   Resp 18   Ht 5' 4\" (1.626 m)   Wt 109 lb 4 oz (49.6 kg)   SpO2 93%   BMI 18.75 kg/m²     Last documented pain score (0-10 scale): Pain Level: 0  Last Weight:   Wt Readings from Last 1 Encounters:   06/04/21 109 lb 4 oz (49.6 kg)     Mental Status:  oriented    IV Access:  - None    Nursing Mobility/ADLs:  Walking   Assisted  Transfer  Assisted  Bathing  Assisted  Dressing  Assisted  Toileting  Assisted  Feeding  Independent  Med Admin  Assisted  Med Delivery   whole    Wound Care Documentation and Therapy:        Elimination:  Continence:   · Bowel: Yes  · Bladder: Yes  Urinary Catheter: None   Colostomy/Ileostomy/Ileal Conduit: No       Date of Last BM: 6/4/21    Intake/Output Summary (Last 24 hours) at 6/4/2021 1452  Last data filed at 6/4/2021 0432  Gross per 24 hour   Intake --   Output 325 ml   Net -325 ml     I/O last 3 completed shifts: In: 360 [P.O.:360]  Out: 525 [Urine:525]    Safety Concerns: At Risk for Falls    Impairments/Disabilities:      None    Nutrition Therapy:  Current Nutrition Therapy:   - Oral Diet:  General    Routes of Feeding: Oral  Liquids: Thin Liquids  Daily Fluid Restriction: no  Last Modified Barium Swallow with Video (Video Swallowing Test): not done    Treatments at the Time of Hospital Discharge:   Respiratory Treatments: Breathing treatments  Oxygen Therapy:  Oxygen is at 2L/min. Ventilator:    - No ventilator support    Rehab Therapies: Physical Therapy, Occupational Therapy and SN  Weight Bearing Status/Restrictions: No weight bearing restirctions  Other Medical Equipment (for information only, NOT a DME order):  N/A  Other Treatments: N/A    Patient's personal belongings (please select all that are sent with patient):  Pt. belongings packed by [employer] BECKETT SIGNATURE:  Electronically signed by Jamie Workman Mani Joe on 6/4/21 at 3:00 PM EDT    CASE MANAGEMENT/SOCIAL WORK SECTION    Inpatient Status Date: 6/1/2021    Readmission Risk Assessment Score:  Readmission Risk              Risk of Unplanned Readmission:  20           Discharging to Facility/ Agency   . Name: [facility]  Address: [address] [facility] [address] [facility] [address]   Phone: [phone]  Fax: [phone]      S3 --frontload    / signature: Electronically signed by Nicolle Rosa RN on 6/4/21 at 2:53 PM EDT    PHYSICIAN SECTION    Prognosis: {Prognosis:9960676721}    Condition at Discharge: [address] Patient Condition:[id number]}    Rehab Potential (if transferring to Rehab): {Prognosis:6187738821}    Recommended Labs or Other Treatments After Discharge: ***    Physician Certification: I certify the above information and transfer of Thania English  is necessary for the continuing treatment of the diagnosis listed and that she requires [address] for less 30 days.      Update Admission H&P: {CHP DME Changes in FJTBL:[id number]}    PHYSICIAN SIGNATURE:  Electronically signed by Daryle Screws, MD/Lashell Conrad RN on 6/4/21 at 2:53 PM EDT

## 2021-06-04 NOTE — PROGRESS NOTES
Pulmonary Progress Note  CC: COPD    Subjective:   anxious      EXAM: /65   Pulse 80   Temp 97.6 °F (36.4 °C) (Oral)   Resp 18   Ht 5' 4\" (1.626 m)   Wt 109 lb 4 oz (49.6 kg)   SpO2 93%   BMI 18.75 kg/m²  on 2L  Constitutional:  No acute distress. Eyes: PERRL. Conjunctivae anicteric. ENT: Normal nose. Normal tongue. Neck:  Trachea is midline. No thyroid tenderness. Respiratory: No accessory muscle usage. decreased breath sounds. No wheezes. No rales. No Rhonchi. Cardiovascular: Normal S1S2. No digit clubbing. No digit cyanosis. No LE edema. Psychiatric: No anxiety or Agitation. Alert and Oriented to person, place and time.     Scheduled Meds:   polyethylene glycol  17 g Oral Daily    iron sucrose (VENOFER) iv piggyback 100 mL (Admin over 60 minutes)  100 mg Intravenous Daily    predniSONE  30 mg Oral Daily    ipratropium-albuterol  3 mL Inhalation TID    atorvastatin  20 mg Oral Nightly    [Held by provider] budesonide  0.5 mg Nebulization BID    clopidogrel  75 mg Oral Daily    docusate sodium  100 mg Oral BID    famotidine  20 mg Oral Daily    metoprolol tartrate  25 mg Oral BID    sertraline  50 mg Oral Daily    sodium chloride flush  5-40 mL Intravenous 2 times per day    enoxaparin  40 mg Subcutaneous Daily    doxycycline hyclate  100 mg Oral 2 times per day     Continuous Infusions:   sodium chloride 25 mL (06/04/21 0947)     PRN Meds:  busPIRone, sodium chloride flush, sodium chloride, promethazine **OR** ondansetron, polyethylene glycol, acetaminophen **OR** acetaminophen, albuterol    Labs:  CBC:   Recent Labs     06/02/21  0357 06/03/21  0420 06/04/21  0518   WBC 5.5 9.6 8.1   HGB 8.8* 9.6* 9.2*   HCT 28.0* 30.5* 30.0*   MCV 91.2 89.5 91.3    216 210     BMP:   Recent Labs     06/02/21  0357 06/03/21  0420 06/04/21  0518    139 143   K 3.9 3.9 5.2*    104 107   CO2 28 29 32   BUN 28* 37* 44*   CREATININE 1.2 1.2 1.1       Chest imaging was reviewed

## 2021-06-04 NOTE — CARE COORDINATION
[facility]    DC order noted, all docs needed have been faxed to [facility] for home care services. Spoke to patient and Lilli Matthew.     [facility] for Monday        [place]  Work mobile: [phone]  [facility] office: [phone]
DISCHARGE ORDER  Date/Time 2021 2:55 PM  Completed by: Michael Hagen RN, Case Management    Patient Name: Beverly Thayer      : 1940  Admitting Diagnosis: COPD exacerbation ([address] [J44.1]      Admit order Date and Status:2021 inpt  (verify MD's last order for status of admission)      Noted discharge order. If applicable PT/OT recommendation at Discharge: SNF  DME recommendation by PT/OT:SNF  Confirmed discharge plan  (pt): Yes  with whom____________pt___  If pt confirmed DC plan does family need to be contacted by CM Yes if yes who__granddaughter in room____  Discharge Plan: Reviewed chart. Role of discharge planner explained and patient verbalized understanding. Granddaughter is in room and pt gave approval for Cm to peak in front of granddaughter. Pt states she is aware that PT/OT recommends SNF, but she plans to return home with her family,as they will provide  assist for pt. CM offered a SNF list. CM offered a [facility] list.  Pt request [facility] for SN/PT/OT, as she states she has had them in the past. Both pt and granddaughter were agreeable. CM made [address] for S3 and frontload. Letitia Bhardwaj is aware. Discharge order is noted. Pt is being d/c'd to home today. Pt's O2 sats are 93% on 2L. Pt is active with [facility] for home O2 and on 2L baseline. No further discharge needs needed or noted. Reviewed chart. Role of discharge planner explained and patient verbalized understanding. Discharge order is noted. Has Home O2 in place on admit:  Yes  Informed of need to bring portable home O2 tank on day of discharge for nursing to connect prior to leaving:   Yes  Verbalized agreement/Understanding:   Yes    Discharge timeout done with [place] RN. All discharge needs and concerns addressed.
[facility]    Referral received from  to follow for home care services. I will follow for needs, and speak with patient to verify demos. Patient will be slotted for Monday [address]  Work mobile: [phone]  [facility] office: [phone]
Schedule:  · Phone:   · Fax: Other Community Services: n/a    DISCHARGE PLAN: Explained Case Management role/services. Chart review completed. Met with pt and pt's granddaughter at bedside whom pt stated could remain present. Pt stated she is independent at home and plans on returning. She stated that she has good family support and someone is with her 24/7. She stated that she has portable o2 tanks for discharge. She stated she may want [facility] for RN/PT/OT when discharged but needs to think about it as she is unsure if she would want it more than once a month. She did inquire about [facility] but stated she likely wouldn't meet the income requirement; number placed on discharge paperwork per pt's request. She denied needs or questions for CM at this time. CM will follow. Please notify CM if needs or concerns arise.

## 2021-06-04 NOTE — PROGRESS NOTES
Inpatient Physical Therapy Evaluation and Treatment    Unit: PCU  Date:  6/4/2021  Patient Name:    Nelson Ramírez  Admitting diagnosis:  COPD exacerbation [facility]) [J44.1]  Admit Date:  6/1/2021  Precautions/Restrictions/WB Status/ Lines/ Wounds/ Oxygen: Fall risk, Bed/chair alarm, Lines -Supplemental O2 (2L) and Purewick catheter, Telemetry and Continuous pulse oximetry    Treatment Time:  0825-0923  Treatment Number:  1   Timed Code Treatment Minutes: 48 minutes  Total Treatment Minutes:  58  minutes    Patient Goals for Therapy: \" to go back home \"          Discharge Recommendations: SNF ; if d/c home, pt will benefit from home PT. Pt is adamantly refusing SNF, wants to return home. States she has family that lives \"300 ft away\" and come \"whenever I call\". Pt states that she does not need someone to stay with her 24/7. She has a life alert button but states \"I don't always use it\" and has voice activated phone but \"it doesn't always work\". Pt and granddaughter were encouraged to arrange 24/7 care upon d/c, and stringent use of life alert button. DME needs for discharge: Needs Met       Therapy recommendation for EMS Transport: can transport by wheelchair    Therapy recommendations for staff:   Assist of 1 with use of rolling walker (RW) for all transfers to/from [facility]  to/from chair. Note - pt is resistant to walker but unsteady without it. History of Present Illness: Per Dr. Екатерина William H&P 6/01: \"61P with copd on 2 lpm NC O2 at home, presents with a 2 week history of increasing SOB. She denies fever, chills, notes onset of a cough productive of white phelgm 2 days ago. She was noted to be hypoxemic 2 days ago as well, requiring an increase in her NC O2 to 3 lpm. She denies chest pain, lower extremity edema, orthopnea. ZENDEJAS progressed prompting ER consultation. Currently patient is seen on NC O2, no distress, awake, alert, oriented, denying worsening sob. \"  PMHx including not limited to: COPD, CAD s/p CABG, GERD, HLD, HTN    Home Health S4 Level Recommendation:  Level 3 Safety  AM-PAC Mobility Score    AM-PAC Inpatient Mobility Raw Score : 18       Preadmission Environment    Pt. Lives Alone - son or other family member stays with pt at night; during the day family is \"in and out all the time\". Home environment:  one story home  Steps to enter first floor: 1 steps to enter and no handrail (doorway)  Steps to second floor: N/A  Bathroom: tub/shower unit and standard height commode  Equipment owned: SW, rollator, wc (manual), shower chair/bench, BSC, SPC, lift chair, hospital bed, home O2 (2L) continous, pulse ox, reacher and lifealert    Preadmission Status:  Pt. Able to drive: No (family drives as needed)  Pt Fully independent with ADLs: No  Pt. Required assistance from family for: Bathing, Cleaning, Cooking, Dressing and Laundry (family always helps with bathing, laundry, cleaning; sometimes helps with dressing \"if I need it\")  Pt. independent for transfers and gait and walked with No Device within the house. Outside the house pt uses w/c with family pushing. History of falls Yes    Pain   Yes  Location: chest  Ratin /10  Pain Medicine Status: Received pain med prior to tx (\"it hasn't helped yet\")    Cognition    A&O Person, Place and Situation   Able to follow 2 step commands    Subjective  Patient lying supine in bed with no family present. Granddaughter arrives midway through treatment. Pt agreeable to this PT eval & tx. Upper Extremity ROM/Strength  Please see OT evaluation.       Lower Extremity ROM / Strength   AROM WFL: Yes    Strength Assessment (measured on a 0-5 scale):  R LE   Quad   3+   Ant Tib  3+   Hamstring 3+   Iliopsoas 3+ with hip pain  L LE  Quad   3+   Ant Tib  3+   Hamstring 3+   Iliopsoas 3+     Lower Extremity Sensation    WFL    Lower Extremity Proprioception:   WFL    Coordination and Tone  WFL    Balance  Sitting:  Good ; Supervision  Comments: Pt sat EOB for ~30 minutes Standing: Fair -; CGA to Min A  Comments: without AD. 2 posterior LOB events upon initially standing at EOB 1st 2 trials, requiring Min A to control sitting back to EOB. Pt demoed backward stepping reaction but unable to regain standing balance. Bed Mobility   Supine to Sit:    Supervision with HOB elevated. Pt has hospital bed at home. Sit to Supine:   Not Tested  Rolling:   Not Tested  Scooting in sitting: Supervision  Scooting in supine:  Not Tested    Transfer Training     Sit to stand:   [facility]  Stand to sit:   [facility]  Bed to Chair:   CGA with use of No AD. Pt declined transfer attempt with RW.    Gait gait completed as indicated below  Distance:      15 ft  Deviations (firm surface/linoleum):  decreased elbert, narrow ADELINA and decreased step length bilaterally, no arm swing bilat, no heel strike/toe off bilat. Assistive Device Used:    No AD  Level of Assist:    CGA - Min A   Comment: Min A at end of trial as pt with lateral LOB requiring steadying assistance and controlled sit to EOB. Pt declined ambulation trial with RW. Stair Training deferred, pt unsafe/ not appropriate to complete stairs at this time     Activity Tolerance   Pt completed therapy session with SOB noted with mobility. On 2L O2 throughout. BP (mmHg)  HR (bpm)  SpO2 (%)    Supine before activity   99/62  89  95%    EOB - lightheaded  89/58  78  89-91%    Seated after ambulation  96/60  76 91%       Positioning Needs   Pt up in chair, alarm set, positioned in proper neutral alignment and pressure relief provided. Call light provided and all needs within reach    Exercises Initiated  Roger deferred secondary to treatment focus on functional mobility  NA    Other  None. Patient/Family Education   Pt educated on role of inpatient PT, POC, importance of continued activity, DC recommendations, safety awareness, pursed lip breathing, pacing activity and calling for assist with mobility.   Pt states \"I hate when people tell me to breathe in through my nose, out through my mouth. It makes me angry! \"    Assessment  Pt seen for Physical Therapy evaluation in acute care setting. Pt demonstrated decreased Activity tolerance, Balance, Safety and Strength as well as decreased independence with Ambulation and Transfers. Recommending SNF upon discharge as patient functioning well below baseline, demonstrates good rehab potential and unable to return home due to home environment not conducive to patient recovery and limited safety awareness. Goals : To be met in 3 visits:  1). Independent with LE Ex x 10 reps    To be met in 6 visits:  1). Supine to/from sit: Modified Independent  2). Sit to/from stand: Independent  3). Bed to chair: Independent  4). Gait: Ambulate 25 ft.   with  Modified Independent and use of LRAD (least restrictive assistive device)  5). Tolerate B LE exercises 3 sets of 10-15 reps  6). Ascend/descend 1 steps with SBA with use of 1 steps to enter and No AD    Rehabilitation Potential: Fair  Strengths for achieving goals include:   Pt motivated, Family Support and Pt cooperative   Barriers to achieving goals include:    Complexity of condition and Weakness    Plan    To be seen 3-5 x / week  while in acute care setting for therapeutic exercises, bed mobility, transfers, progressive gait training, balance training, and family/patient education. Signature: Siena Matthews, PT, DPT    If patient discharges from this facility prior to next visit, this note will serve as the Discharge Summary.

## 2021-06-04 NOTE — PROGRESS NOTES
Pt A+O. Medications given, see MAR. Pt still having some issues swallowing. Shift assessment completed, see MAR. No other needs or discomforts stated at this time. Call light in easy reach, bedside table in easy reach, and bed in lowest position.   Roxann Neumann RN

## 2021-06-04 NOTE — PROGRESS NOTES
Pt given written and verbal discharge instructions. Pt indicated understanding of home medication and care instructions. Prescriptions sent to pt preferred pharmacy. Pt packed own belongings. Pt taken down to family car via wheelchair.

## 2021-06-04 NOTE — PROGRESS NOTES
Progress Note    Admit Date:  6/1/2021    79year old female with COPD presented with hypoxia and worsening shortness of breath. Admitted for COPD exacerbation. Subjective:  Ms. Kasandra Goodpasture states she is feeling better     Objective:   Patient Vitals for the past 4 hrs:   BP Resp SpO2   06/04/21 0741 -- 17 100 %   06/04/21 0521 -- 18 96 %   06/04/21 0515 116/63 -- --            Intake/Output Summary (Last 24 hours) at 6/4/2021 0843  Last data filed at 6/4/2021 0432  Gross per 24 hour   Intake 360 ml   Output 525 ml   Net -165 ml       Physical Exam:  Gen: No distress. Alert. Eyes: PERRL. No sclera icterus. No conjunctival injection. ENT: No discharge. Pharynx clear. Neck: Trachea midline. Resp: no accessory muscle use. No crackles. mild wheezes. No rhonchi. LS diminished. CV: Regular rate. Regular rhythm. No murmur. No rub. No edema. Capillary Refill: Brisk,< 3 seconds   Peripheral Pulses: +2 palpable, equal bilaterally   GI: Non-tender. Non-distended. Normal bowel sounds. No hernia. Skin: Warm and dry. No nodule on exposed extremities. No rash on exposed extremities. M/S: No cyanosis. No joint deformity. No clubbing. Neuro: Awake. Grossly nonfocal    Psych: Oriented x 3. No anxiety or agitation      Data:  CBC:   Recent Labs     06/02/21  0357 06/03/21  0420 06/04/21  0518   WBC 5.5 9.6 8.1   HGB 8.8* 9.6* 9.2*   HCT 28.0* 30.5* 30.0*   MCV 91.2 89.5 91.3    216 210     BMP:   Recent Labs     06/02/21  0357 06/03/21  0420 06/04/21  0518    139 143   K 3.9 3.9 5.2*    104 107   CO2 28 29 32   BUN 28* 37* 44*   CREATININE 1.2 1.2 1.1     LIVER PROFILE:   Recent Labs     06/01/21  1320   AST 20   ALT 13   BILITOT 0.4   ALKPHOS 73     PT/INR: No results for input(s): PROTIME, INR in the last 72 hours. CULTURES  COVID/Influenza: not detected  Blood: pending    RADIOLOGY  XR CHEST (2 VW)   Final Result   No radiographic evidence of acute pulmonary disease. Assessment/Plan:  COPD exacerbation  Chronic hypoxic respiratory failure   - admitted for further management/care. Pulmonology consulted. - continue Pulmicort, Albuterol  - IV Solu-medrol. decrease to prednisone 30 mg daily   - Doxycycline,    Elevated troponin   - 0.04, 0.04, 0.02.    - non-specific T wave abnormality on EKG. No ACS    Hypertension  - BP Borderline. On Losartan, Metoprolol. Hold losartan as blood pressure is borderline low. Hyperlipidemia   - on Atorvastatin. Normocytic anemia   - Hgb 10.1-->8.8--9.6--9.2  H/HCT-11.9/36.9  - monitor CBC  Hemoccult stools- negative  check iron studies - iron def   H/o lower GI bleed 2/2020- colonoscopy- poor prep- no blood seen  flex sig 2/27/2020- 4 bands placed on hemorrhoids   Start IV iron  Will need oral iron at MD   Outpatient GI follow up- Dr. Victoriano Cali      CAD, S/p CABG  - on Plavix, Atorvastatin, Metoprolol. GERD  - continue pepcid. CKD stage 3  - stable. Monitor labs. DVT Prophylaxis: Lovenox  Diet: ADULT DIET; Regular  Code Status: Full Code    Ot/pt   SNF recommended but patient refused. NM home with 24/ 7 care     HECTOR Villagomez.

## 2021-06-04 NOTE — PROGRESS NOTES
Physician Progress Note      PATIENT:               Shaylee Correa  CSN #:                  [id number]  :                       1940  ADMIT DATE:       2021 1:00 PM  [address] [place] DATE:  RESPONDING  PROVIDER #:        Ladan Duran MD          QUERY TEXT:    Patient admitted with ae COPD. If possible, please document in progress notes   and discharge summary if you are evaluating and /or treating any of the   following: The medical record reflects the following:  Risk Factors: BMI 18.75, ae COPD, chronic resp failure  Clinical Indicators: Dietitian notes 20 - Meets criteria for severe PCM-   as evidenced by Diet history of poor intake, Weight loss, Severe muscle loss,   Severe loss of subcutaneous fat  Treatment: monitor labs/images, monitor vitals, daily weights, I&O    Thank You Tanvi Leigh RN, [email or web address]  Options provided:  -- Protein calorie malnutrition mild  -- Protein calorie malnutrition moderate  -- Protein calorie malnutrition severe  -- Underweight with BMI 18.75  -- Other - I will add my own diagnosis  -- Disagree - Not applicable / Not valid  -- Disagree - Clinically unable to determine / Unknown  -- Refer to Clinical Documentation Reviewer    PROVIDER RESPONSE TEXT:    This patient has moderate protein calorie malnutrition.     Query created by: Carito Thomson on 2021 12:41 PM      Electronically signed by:  Ladan Duran MD 2021 3:18 PM

## 2021-06-04 NOTE — FLOWSHEET NOTE
06/04/21 0930   Vital Signs   Temp 97.6 °F (36.4 °C)   Temp Source Oral   Pulse 80   Heart Rate Source Monitor   Resp 18   /65   BP Location Right upper arm   Patient Position Sitting   Level of Consciousness Alert (0)   MEWS Score 1   Patient Currently in Pain No   Pain Assessment   Pain Assessment 0-10   Pain Level 0   Oxygen Therapy   SpO2 93 %   O2 Device Nasal cannula   O2 Flow Rate (L/min) 2 L/min   AM assessment completed, see flow sheet. Pt is alert and oriented. Vital signs are WNL. Respirations are even & easy. No complaints voiced. Pt. sitting up in chair after working with therapy. Pt denies needs at this time. SR up x 2, and bed in low position. Call light is within reach. Bedside Mobility Assessment Tool (BMAT):     Assessment Level 1- Sit and Shake    1. From a semi-reclined position, ask patient to sit up and rotate to a seated position at the side of the bed. Can use the bedrail. 2. Ask patient to reach out and grab your hand and shake making sure patient reaches across his/her midline. Pass- Patient is able to come to a seated position, maintain core strength. Maintains seated balance while reaching across midline. Move on to Assessment Level 2. Assessment Level 2- Stretch and Point   1. With patient in seated position at the side of the bed, have patient place both feet on the floor (or stool) with knees no higher than hips. 2. Ask patient to stretch one leg and straighten the knee, then bend the ankle/flex and point the toes. If appropriate, repeat with the other leg. Pass- Patient is able to demonstrate appropriate quad strength on intended weight bearing limb(s). Move onto Assessment Level 3. Assessment Level 3- Stand   1. Ask patient to elevate off the bed or chair (seated to standing) using an assistive device (cane, bedrail). 2. Patient should be able to raise buttocks off be and hold for a count of five. May repeat once.    Pass- Patient maintains standing stability for at least 5 seconds, proceed to assessment level 4. Assessment Level 4- Walk   1. Ask patient to march in place at bedside. 2. Then ask patient to advance step and return each foot. Some medical conditions may render a patient from stepping backwards, use your best clinical judgement. Fail- Patient not able to complete tasks OR requires use of assistive device. Patient is MOBILITY LEVEL 3. Mobility Level- 3    Patient is able to demonstrate the ability to move from a reclining position to an upright position within the recliner.

## 2021-06-04 NOTE — PROGRESS NOTES
Pt called out reporting chest pain. Describes substernal and R pleural pain and a headache, pt states \"could be related to breathing but I am unsure\". STAT EKG and Troponin ordered. Pt continues to be on tele-monitor and continuous pulse oximetry. Call light in easy reach, bedside table in easy reach, and bed in lowest position.   Abdifatah Akhtar RN

## 2021-06-05 LAB
BLOOD CULTURE, ROUTINE: NORMAL
CULTURE, BLOOD 2: NORMAL

## 2021-08-29 ENCOUNTER — HOSPITAL ENCOUNTER (INPATIENT)
Age: 81
LOS: 4 days | Discharge: NURSING, REHAB OR LONG-TERM CARE | DRG: 871 | End: 2021-09-03
Attending: EMERGENCY MEDICINE | Admitting: INTERNAL MEDICINE
Payer: MEDICARE

## 2021-08-29 ENCOUNTER — APPOINTMENT (OUTPATIENT)
Dept: GENERAL RADIOLOGY | Age: 81
DRG: 871 | End: 2021-08-29
Payer: MEDICARE

## 2021-08-29 DIAGNOSIS — I50.9 CONGESTIVE HEART FAILURE, UNSPECIFIED HF CHRONICITY, UNSPECIFIED HEART FAILURE TYPE (HCC): ICD-10-CM

## 2021-08-29 DIAGNOSIS — J96.22 ACUTE ON CHRONIC RESPIRATORY FAILURE WITH HYPOXIA AND HYPERCAPNIA (HCC): Primary | ICD-10-CM

## 2021-08-29 DIAGNOSIS — J96.21 ACUTE ON CHRONIC RESPIRATORY FAILURE WITH HYPOXIA AND HYPERCAPNIA (HCC): Primary | ICD-10-CM

## 2021-08-29 LAB
A/G RATIO: 1.4 (ref 1.1–2.2)
ALBUMIN SERPL-MCNC: 3.7 G/DL (ref 3.4–5)
ALP BLD-CCNC: 123 U/L (ref 40–129)
ALT SERPL-CCNC: 16 U/L (ref 10–40)
ANION GAP SERPL CALCULATED.3IONS-SCNC: 8 MMOL/L (ref 3–16)
AST SERPL-CCNC: 20 U/L (ref 15–37)
BACTERIA: ABNORMAL /HPF
BASE EXCESS VENOUS: 1.3 MMOL/L (ref -3–3)
BASOPHILS ABSOLUTE: 0.1 K/UL (ref 0–0.2)
BASOPHILS RELATIVE PERCENT: 0.5 %
BILIRUB SERPL-MCNC: 0.4 MG/DL (ref 0–1)
BILIRUBIN URINE: NEGATIVE
BLOOD, URINE: NEGATIVE
BUN BLDV-MCNC: 15 MG/DL (ref 7–20)
CALCIUM SERPL-MCNC: 9.2 MG/DL (ref 8.3–10.6)
CARBOXYHEMOGLOBIN: 1.3 % (ref 0–1.5)
CHLORIDE BLD-SCNC: 104 MMOL/L (ref 99–110)
CLARITY: CLEAR
CO2: 30 MMOL/L (ref 21–32)
COLOR: YELLOW
CREAT SERPL-MCNC: 1 MG/DL (ref 0.6–1.2)
EOSINOPHILS ABSOLUTE: 0 K/UL (ref 0–0.6)
EOSINOPHILS RELATIVE PERCENT: 0.1 %
GFR AFRICAN AMERICAN: >60
GFR NON-AFRICAN AMERICAN: 53
GLOBULIN: 2.7 G/DL
GLUCOSE BLD-MCNC: 285 MG/DL (ref 70–99)
GLUCOSE URINE: NEGATIVE MG/DL
HCO3 VENOUS: 32 MMOL/L (ref 23–29)
HCT VFR BLD CALC: 31.4 % (ref 36–48)
HEMOGLOBIN: 9.9 G/DL (ref 12–16)
KETONES, URINE: NEGATIVE MG/DL
LACTIC ACID, SEPSIS: 1.8 MMOL/L (ref 0.4–1.9)
LEUKOCYTE ESTERASE, URINE: NEGATIVE
LIPASE: 102 U/L (ref 13–60)
LYMPHOCYTES ABSOLUTE: 1.7 K/UL (ref 1–5.1)
LYMPHOCYTES RELATIVE PERCENT: 13 %
MCH RBC QN AUTO: 29.7 PG (ref 26–34)
MCHC RBC AUTO-ENTMCNC: 31.6 G/DL (ref 31–36)
MCV RBC AUTO: 93.9 FL (ref 80–100)
METHEMOGLOBIN VENOUS: 0.8 %
MICROSCOPIC EXAMINATION: YES
MONOCYTES ABSOLUTE: 0.3 K/UL (ref 0–1.3)
MONOCYTES RELATIVE PERCENT: 2.5 %
NEUTROPHILS ABSOLUTE: 11.1 K/UL (ref 1.7–7.7)
NEUTROPHILS RELATIVE PERCENT: 83.9 %
NITRITE, URINE: NEGATIVE
O2 SAT, VEN: 99 %
O2 THERAPY: ABNORMAL
PCO2, VEN: 92.1 MMHG (ref 40–50)
PDW BLD-RTO: 18.8 % (ref 12.4–15.4)
PH UA: 6.5 (ref 5–8)
PH VENOUS: 7.16 (ref 7.35–7.45)
PLATELET # BLD: 450 K/UL (ref 135–450)
PMV BLD AUTO: 7.8 FL (ref 5–10.5)
PO2, VEN: 184 MMHG (ref 25–40)
POTASSIUM REFLEX MAGNESIUM: 4.9 MMOL/L (ref 3.5–5.1)
PRO-BNP: 6133 PG/ML (ref 0–449)
PROTEIN UA: 30 MG/DL
RBC # BLD: 3.34 M/UL (ref 4–5.2)
RBC UA: ABNORMAL /HPF (ref 0–4)
SARS-COV-2, NAAT: NOT DETECTED
SODIUM BLD-SCNC: 142 MMOL/L (ref 136–145)
SPECIFIC GRAVITY UA: 1.02 (ref 1–1.03)
TCO2 CALC VENOUS: 35 MMOL/L
TOTAL PROTEIN: 6.4 G/DL (ref 6.4–8.2)
TROPONIN: 0.03 NG/ML
URINE REFLEX TO CULTURE: ABNORMAL
URINE TYPE: ABNORMAL
UROBILINOGEN, URINE: 0.2 E.U./DL
WBC # BLD: 13.2 K/UL (ref 4–11)
WBC UA: ABNORMAL /HPF (ref 0–5)
YEAST: PRESENT /HPF

## 2021-08-29 PROCEDURE — 83605 ASSAY OF LACTIC ACID: CPT

## 2021-08-29 PROCEDURE — 84484 ASSAY OF TROPONIN QUANT: CPT

## 2021-08-29 PROCEDURE — 87040 BLOOD CULTURE FOR BACTERIA: CPT

## 2021-08-29 PROCEDURE — 2580000003 HC RX 258: Performed by: EMERGENCY MEDICINE

## 2021-08-29 PROCEDURE — 82803 BLOOD GASES ANY COMBINATION: CPT

## 2021-08-29 PROCEDURE — 84145 PROCALCITONIN (PCT): CPT

## 2021-08-29 PROCEDURE — 83690 ASSAY OF LIPASE: CPT

## 2021-08-29 PROCEDURE — 71045 X-RAY EXAM CHEST 1 VIEW: CPT

## 2021-08-29 PROCEDURE — 83880 ASSAY OF NATRIURETIC PEPTIDE: CPT

## 2021-08-29 PROCEDURE — 81001 URINALYSIS AUTO W/SCOPE: CPT

## 2021-08-29 PROCEDURE — 94761 N-INVAS EAR/PLS OXIMETRY MLT: CPT

## 2021-08-29 PROCEDURE — 99285 EMERGENCY DEPT VISIT HI MDM: CPT

## 2021-08-29 PROCEDURE — 80053 COMPREHEN METABOLIC PANEL: CPT

## 2021-08-29 PROCEDURE — 94660 CPAP INITIATION&MGMT: CPT

## 2021-08-29 PROCEDURE — 96375 TX/PRO/DX INJ NEW DRUG ADDON: CPT

## 2021-08-29 PROCEDURE — 87150 DNA/RNA AMPLIFIED PROBE: CPT

## 2021-08-29 PROCEDURE — 96374 THER/PROPH/DIAG INJ IV PUSH: CPT

## 2021-08-29 PROCEDURE — 87635 SARS-COV-2 COVID-19 AMP PRB: CPT

## 2021-08-29 PROCEDURE — 93005 ELECTROCARDIOGRAM TRACING: CPT | Performed by: EMERGENCY MEDICINE

## 2021-08-29 PROCEDURE — 85025 COMPLETE CBC W/AUTO DIFF WBC: CPT

## 2021-08-29 PROCEDURE — 2700000000 HC OXYGEN THERAPY PER DAY

## 2021-08-29 RX ORDER — FUROSEMIDE 10 MG/ML
40 INJECTION INTRAMUSCULAR; INTRAVENOUS ONCE
Status: COMPLETED | OUTPATIENT
Start: 2021-08-29 | End: 2021-08-30

## 2021-08-29 RX ORDER — 0.9 % SODIUM CHLORIDE 0.9 %
500 INTRAVENOUS SOLUTION INTRAVENOUS ONCE
Status: COMPLETED | OUTPATIENT
Start: 2021-08-29 | End: 2021-08-30

## 2021-08-29 RX ADMIN — SODIUM CHLORIDE 500 ML: 9 INJECTION, SOLUTION INTRAVENOUS at 22:30

## 2021-08-30 PROBLEM — J96.00 ACUTE RESPIRATORY FAILURE (HCC): Status: ACTIVE | Noted: 2021-08-30

## 2021-08-30 LAB
APTT: 50.5 SEC (ref 26.2–38.6)
APTT: 69.9 SEC (ref 26.2–38.6)
APTT: 98.2 SEC (ref 26.2–38.6)
CHP ED QC CHECK: NORMAL
EKG ATRIAL RATE: 143 BPM
EKG ATRIAL RATE: 87 BPM
EKG DIAGNOSIS: NORMAL
EKG DIAGNOSIS: NORMAL
EKG P AXIS: 75 DEGREES
EKG P AXIS: 79 DEGREES
EKG P-R INTERVAL: 128 MS
EKG P-R INTERVAL: 130 MS
EKG Q-T INTERVAL: 290 MS
EKG Q-T INTERVAL: 356 MS
EKG QRS DURATION: 84 MS
EKG QRS DURATION: 86 MS
EKG QTC CALCULATION (BAZETT): 447 MS
EKG QTC CALCULATION (BAZETT): 452 MS
EKG R AXIS: -29 DEGREES
EKG R AXIS: -61 DEGREES
EKG T AXIS: 78 DEGREES
EKG T AXIS: 96 DEGREES
EKG VENTRICULAR RATE: 143 BPM
EKG VENTRICULAR RATE: 97 BPM
ESTIMATED AVERAGE GLUCOSE: 102.5 MG/DL
GLUCOSE BLD-MCNC: 101 MG/DL (ref 70–99)
GLUCOSE BLD-MCNC: 129 MG/DL (ref 70–99)
GLUCOSE BLD-MCNC: 91 MG/DL
GLUCOSE BLD-MCNC: 91 MG/DL (ref 70–99)
HBA1C MFR BLD: 5.2 %
PERFORMED ON: ABNORMAL
PERFORMED ON: ABNORMAL
PERFORMED ON: NORMAL
PROCALCITONIN: 0.16 NG/ML (ref 0–0.15)
REPORT: NORMAL
SARS-COV-2: NOT DETECTED
TROPONIN: 0.06 NG/ML
TROPONIN: 0.07 NG/ML
TROPONIN: 0.07 NG/ML
VANCOMYCIN RANDOM: 10.9 UG/ML

## 2021-08-30 PROCEDURE — 36415 COLL VENOUS BLD VENIPUNCTURE: CPT

## 2021-08-30 PROCEDURE — 2060000000 HC ICU INTERMEDIATE R&B

## 2021-08-30 PROCEDURE — U0003 INFECTIOUS AGENT DETECTION BY NUCLEIC ACID (DNA OR RNA); SEVERE ACUTE RESPIRATORY SYNDROME CORONAVIRUS 2 (SARS-COV-2) (CORONAVIRUS DISEASE [COVID-19]), AMPLIFIED PROBE TECHNIQUE, MAKING USE OF HIGH THROUGHPUT TECHNOLOGIES AS DESCRIBED BY CMS-2020-01-R: HCPCS

## 2021-08-30 PROCEDURE — 6360000002 HC RX W HCPCS: Performed by: EMERGENCY MEDICINE

## 2021-08-30 PROCEDURE — 85730 THROMBOPLASTIN TIME PARTIAL: CPT

## 2021-08-30 PROCEDURE — 6360000002 HC RX W HCPCS: Performed by: INTERNAL MEDICINE

## 2021-08-30 PROCEDURE — 2580000003 HC RX 258: Performed by: INTERNAL MEDICINE

## 2021-08-30 PROCEDURE — 6370000000 HC RX 637 (ALT 250 FOR IP): Performed by: INTERNAL MEDICINE

## 2021-08-30 PROCEDURE — 87641 MR-STAPH DNA AMP PROBE: CPT

## 2021-08-30 PROCEDURE — 84484 ASSAY OF TROPONIN QUANT: CPT

## 2021-08-30 PROCEDURE — 94640 AIRWAY INHALATION TREATMENT: CPT

## 2021-08-30 PROCEDURE — 51702 INSERT TEMP BLADDER CATH: CPT

## 2021-08-30 PROCEDURE — 83036 HEMOGLOBIN GLYCOSYLATED A1C: CPT

## 2021-08-30 PROCEDURE — 94660 CPAP INITIATION&MGMT: CPT

## 2021-08-30 PROCEDURE — 93010 ELECTROCARDIOGRAM REPORT: CPT | Performed by: INTERNAL MEDICINE

## 2021-08-30 PROCEDURE — 2580000003 HC RX 258: Performed by: EMERGENCY MEDICINE

## 2021-08-30 PROCEDURE — U0005 INFEC AGEN DETEC AMPLI PROBE: HCPCS

## 2021-08-30 PROCEDURE — 93005 ELECTROCARDIOGRAM TRACING: CPT | Performed by: INTERNAL MEDICINE

## 2021-08-30 PROCEDURE — 80202 ASSAY OF VANCOMYCIN: CPT

## 2021-08-30 PROCEDURE — 87449 NOS EACH ORGANISM AG IA: CPT

## 2021-08-30 PROCEDURE — 2700000000 HC OXYGEN THERAPY PER DAY

## 2021-08-30 PROCEDURE — 94761 N-INVAS EAR/PLS OXIMETRY MLT: CPT

## 2021-08-30 RX ORDER — HEPARIN SODIUM 1000 [USP'U]/ML
60 INJECTION, SOLUTION INTRAVENOUS; SUBCUTANEOUS PRN
Status: DISCONTINUED | OUTPATIENT
Start: 2021-08-30 | End: 2021-08-31

## 2021-08-30 RX ORDER — NICOTINE POLACRILEX 4 MG
15 LOZENGE BUCCAL PRN
Status: DISCONTINUED | OUTPATIENT
Start: 2021-08-30 | End: 2021-09-03 | Stop reason: HOSPADM

## 2021-08-30 RX ORDER — HEPARIN SODIUM 1000 [USP'U]/ML
1600 INJECTION, SOLUTION INTRAVENOUS; SUBCUTANEOUS ONCE
Status: COMPLETED | OUTPATIENT
Start: 2021-08-30 | End: 2021-08-30

## 2021-08-30 RX ORDER — HEPARIN SODIUM 1000 [USP'U]/ML
60 INJECTION, SOLUTION INTRAVENOUS; SUBCUTANEOUS ONCE
Status: COMPLETED | OUTPATIENT
Start: 2021-08-30 | End: 2021-08-30

## 2021-08-30 RX ORDER — FUROSEMIDE 10 MG/ML
40 INJECTION INTRAMUSCULAR; INTRAVENOUS 2 TIMES DAILY
Status: CANCELLED | OUTPATIENT
Start: 2021-08-30

## 2021-08-30 RX ORDER — GUAIFENESIN/DEXTROMETHORPHAN 100-10MG/5
5 SYRUP ORAL EVERY 4 HOURS PRN
Status: DISCONTINUED | OUTPATIENT
Start: 2021-08-30 | End: 2021-08-31

## 2021-08-30 RX ORDER — ALBUTEROL SULFATE 90 UG/1
2 AEROSOL, METERED RESPIRATORY (INHALATION)
Status: DISCONTINUED | OUTPATIENT
Start: 2021-08-30 | End: 2021-09-03 | Stop reason: HOSPADM

## 2021-08-30 RX ORDER — HEPARIN SODIUM 10000 [USP'U]/100ML
830 INJECTION, SOLUTION INTRAVENOUS CONTINUOUS
Status: DISCONTINUED | OUTPATIENT
Start: 2021-08-30 | End: 2021-08-31

## 2021-08-30 RX ORDER — SODIUM CHLORIDE 9 MG/ML
25 INJECTION, SOLUTION INTRAVENOUS PRN
Status: DISCONTINUED | OUTPATIENT
Start: 2021-08-30 | End: 2021-09-03 | Stop reason: HOSPADM

## 2021-08-30 RX ORDER — 0.9 % SODIUM CHLORIDE 0.9 %
500 INTRAVENOUS SOLUTION INTRAVENOUS ONCE
Status: COMPLETED | OUTPATIENT
Start: 2021-08-30 | End: 2021-08-30

## 2021-08-30 RX ORDER — ASPIRIN 300 MG/1
300 SUPPOSITORY RECTAL ONCE
Status: COMPLETED | OUTPATIENT
Start: 2021-08-30 | End: 2021-08-30

## 2021-08-30 RX ORDER — HEPARIN SODIUM 1000 [USP'U]/ML
30 INJECTION, SOLUTION INTRAVENOUS; SUBCUTANEOUS PRN
Status: DISCONTINUED | OUTPATIENT
Start: 2021-08-30 | End: 2021-08-31

## 2021-08-30 RX ORDER — DEXTROSE MONOHYDRATE 25 G/50ML
12.5 INJECTION, SOLUTION INTRAVENOUS PRN
Status: DISCONTINUED | OUTPATIENT
Start: 2021-08-30 | End: 2021-09-03 | Stop reason: HOSPADM

## 2021-08-30 RX ORDER — POTASSIUM CHLORIDE 7.45 MG/ML
10 INJECTION INTRAVENOUS PRN
Status: DISCONTINUED | OUTPATIENT
Start: 2021-08-30 | End: 2021-08-30

## 2021-08-30 RX ORDER — METHYLPREDNISOLONE SODIUM SUCCINATE 125 MG/2ML
60 INJECTION, POWDER, LYOPHILIZED, FOR SOLUTION INTRAMUSCULAR; INTRAVENOUS ONCE
Status: COMPLETED | OUTPATIENT
Start: 2021-08-30 | End: 2021-08-30

## 2021-08-30 RX ORDER — ACETAMINOPHEN 325 MG/1
650 TABLET ORAL EVERY 6 HOURS PRN
Status: DISCONTINUED | OUTPATIENT
Start: 2021-08-30 | End: 2021-08-31

## 2021-08-30 RX ORDER — PROMETHAZINE HYDROCHLORIDE 25 MG/1
12.5 TABLET ORAL EVERY 6 HOURS PRN
Status: DISCONTINUED | OUTPATIENT
Start: 2021-08-30 | End: 2021-09-03 | Stop reason: HOSPADM

## 2021-08-30 RX ORDER — 0.9 % SODIUM CHLORIDE 0.9 %
1000 INTRAVENOUS SOLUTION INTRAVENOUS ONCE
Status: COMPLETED | OUTPATIENT
Start: 2021-08-30 | End: 2021-08-30

## 2021-08-30 RX ORDER — TRANEXAMIC ACID 100 MG/ML
INJECTION, SOLUTION INTRAVENOUS
Status: DISPENSED
Start: 2021-08-30 | End: 2021-08-30

## 2021-08-30 RX ORDER — DEXTROSE MONOHYDRATE 50 MG/ML
100 INJECTION, SOLUTION INTRAVENOUS PRN
Status: DISCONTINUED | OUTPATIENT
Start: 2021-08-30 | End: 2021-09-03 | Stop reason: HOSPADM

## 2021-08-30 RX ORDER — ONDANSETRON 2 MG/ML
4 INJECTION INTRAMUSCULAR; INTRAVENOUS EVERY 6 HOURS PRN
Status: DISCONTINUED | OUTPATIENT
Start: 2021-08-30 | End: 2021-09-03 | Stop reason: HOSPADM

## 2021-08-30 RX ORDER — IPRATROPIUM BROMIDE AND ALBUTEROL SULFATE 2.5; .5 MG/3ML; MG/3ML
1 SOLUTION RESPIRATORY (INHALATION)
Status: DISCONTINUED | OUTPATIENT
Start: 2021-08-30 | End: 2021-08-30

## 2021-08-30 RX ORDER — SODIUM CHLORIDE 0.9 % (FLUSH) 0.9 %
10 SYRINGE (ML) INJECTION EVERY 12 HOURS SCHEDULED
Status: DISCONTINUED | OUTPATIENT
Start: 2021-08-30 | End: 2021-09-03 | Stop reason: HOSPADM

## 2021-08-30 RX ORDER — SODIUM CHLORIDE 0.9 % (FLUSH) 0.9 %
10 SYRINGE (ML) INJECTION PRN
Status: DISCONTINUED | OUTPATIENT
Start: 2021-08-30 | End: 2021-09-03 | Stop reason: HOSPADM

## 2021-08-30 RX ORDER — ACETAMINOPHEN 650 MG/1
650 SUPPOSITORY RECTAL EVERY 6 HOURS PRN
Status: DISCONTINUED | OUTPATIENT
Start: 2021-08-30 | End: 2021-08-31

## 2021-08-30 RX ORDER — MAGNESIUM SULFATE IN WATER 40 MG/ML
2000 INJECTION, SOLUTION INTRAVENOUS PRN
Status: DISCONTINUED | OUTPATIENT
Start: 2021-08-30 | End: 2021-08-30

## 2021-08-30 RX ADMIN — FUROSEMIDE 40 MG: 10 INJECTION, SOLUTION INTRAMUSCULAR; INTRAVENOUS at 00:08

## 2021-08-30 RX ADMIN — ASPIRIN 300 MG: 300 SUPPOSITORY RECTAL at 05:24

## 2021-08-30 RX ADMIN — Medication 2 PUFF: at 08:55

## 2021-08-30 RX ADMIN — ACETAMINOPHEN 650 MG: 325 TABLET ORAL at 18:21

## 2021-08-30 RX ADMIN — SODIUM CHLORIDE 1000 ML: 9 INJECTION, SOLUTION INTRAVENOUS at 03:02

## 2021-08-30 RX ADMIN — METHYLPREDNISOLONE SODIUM SUCCINATE 60 MG: 125 INJECTION, POWDER, FOR SOLUTION INTRAMUSCULAR; INTRAVENOUS at 04:04

## 2021-08-30 RX ADMIN — IPRATROPIUM BROMIDE AND ALBUTEROL SULFATE 1 AMPULE: .5; 3 SOLUTION RESPIRATORY (INHALATION) at 04:20

## 2021-08-30 RX ADMIN — SODIUM CHLORIDE 500 ML: 9 INJECTION, SOLUTION INTRAVENOUS at 02:46

## 2021-08-30 RX ADMIN — Medication 2 PUFF: at 20:02

## 2021-08-30 RX ADMIN — VANCOMYCIN HYDROCHLORIDE 1000 MG: 1 INJECTION, POWDER, LYOPHILIZED, FOR SOLUTION INTRAVENOUS at 01:19

## 2021-08-30 RX ADMIN — CEFEPIME HYDROCHLORIDE 1000 MG: 1 INJECTION, POWDER, FOR SOLUTION INTRAMUSCULAR; INTRAVENOUS at 00:05

## 2021-08-30 RX ADMIN — CEFEPIME 2000 MG: 2 INJECTION, POWDER, FOR SOLUTION INTRAMUSCULAR; INTRAVENOUS at 23:38

## 2021-08-30 RX ADMIN — VANCOMYCIN HYDROCHLORIDE 750 MG: 750 INJECTION, POWDER, LYOPHILIZED, FOR SOLUTION INTRAVENOUS at 22:07

## 2021-08-30 RX ADMIN — CEFEPIME 2000 MG: 2 INJECTION, POWDER, FOR SOLUTION INTRAMUSCULAR; INTRAVENOUS at 11:57

## 2021-08-30 RX ADMIN — HEPARIN SODIUM 3130 UNITS: 1000 INJECTION INTRAVENOUS; SUBCUTANEOUS at 04:17

## 2021-08-30 RX ADMIN — HEPARIN SODIUM 630 UNITS/HR: 10000 INJECTION, SOLUTION INTRAVENOUS at 04:19

## 2021-08-30 RX ADMIN — Medication 2 PUFF: at 19:56

## 2021-08-30 RX ADMIN — HEPARIN SODIUM 1600 UNITS: 1000 INJECTION, SOLUTION INTRAVENOUS; SUBCUTANEOUS at 11:58

## 2021-08-30 ASSESSMENT — PAIN SCALES - GENERAL
PAINLEVEL_OUTOF10: 0
PAINLEVEL_OUTOF10: 7
PAINLEVEL_OUTOF10: 0

## 2021-08-30 NOTE — PROGRESS NOTES
08/30/21 0420   NIV Type   Skin Protection for O2 Device Yes   Equipment Type v60   Mode Bilevel   Mask Type Full face mask   Mask Size Small   Settings/Measurements   IPAP 20 cmH20   CPAP/EPAP 8 cmH2O   Rate Ordered 4   Resp 22   FiO2  50 %   Vt Exhaled 421 mL   Minute Volume 9.3 Liters   Mask Leak (lpm) 3 lpm   Comfort Level Good   Using Accessory Muscles No   SpO2 96   Alarm Settings   Alarms On Y   Press Low Alarm 6 cmH2O   High Pressure Alarm 30 cmH2O   Delay Alarm 20 sec(s)   Resp Rate Low Alarm 6   High Respiratory Rate 50 br/min

## 2021-08-30 NOTE — CONSULTS
Pharmacy to Manage Heparin Infusion per [facility]    Dx: NSTEMI  Pt wt = 52.2 kg- adjusted. Baseline aPTT = Pending. Low Dose Heparin Infusion  Heparin 60 units/kg IVP bolus followed by Heparin infusion at 12 units/kg/hr (recommended initial max dose 1000 units./hr). Recheck aPTT in 6 hours. Goal aPTT = 60-90 seconds. Hazel Danielson PharmD  8/30/2021 3:54 AM    Recent Labs     08/30/21  1001   APTT 50.5*     Give 1600 units bolus  Increase Heparin infusion rate to 730 units/hr  Recheck aPTT in 6 hours  Joelle Cortez PharmD 8/30/2021  11:03 AM    -aPTT= 69.9sec @ 1834  Continue heparin at 730units/hr  Next aPTT in 6 hours  Kianna Rodriguez PharmD 8/30/2021 9:18 PM     8/31/2021  Recent Labs     08/31/21  0011   APTT 63.6*     Continue heparin gtt at 730 units/hr. Check aPTT daily.   Hazel Danielson PharmD  8/31/2021 12:51 AM    Recent Labs     08/31/21  0441   APTT 59.7*     Borderline therapeutic; will not give bolus  Increase Heparin infusion rate to 830 units/hr  Recheck aPTT in 6 hours  Joelle Cortez PharmD 8/31/2021  7:53 AM

## 2021-08-30 NOTE — PROGRESS NOTES
4 Eyes Skin Assessment     NAME:  Kailey Smith  YOB: 1940  MEDICAL RECORD NUMBER:  [id number]    The patient is being assess for  Admission    I agree that 2 RN's have performed a thorough Head to Toe Skin Assessment on the patient. ALL assessment sites listed below have been assessed. Areas assessed by both nurses:    Head, Face, Ears, Shoulders, Back, Chest, Arms, Elbows, Hands, Sacrum. Buttock, Coccyx, Ischium and Legs. Feet and Heels        Does the Patient have a Wound? Yes wound(s) were present on assessment.  LDA wound assessment was Initiated and completed        Trey Prevention initiated:  Yes   Wound Care Orders initiated:  no    Pressure Injury (Stage 3,4, Unstageable, DTI, NWPT, and Complex wounds) if present place consult order under [de-identified] No    New and Established Ostomies if present place consult order under : No      Nurse 1 eSignature: Electronically signed by Darshan Coffman RN on 8/30/21 at 6:36 AM EDT    **SHARE this note so that the co-signing nurse is able to place an eSignature**    Nurse 2 eSignature: {Esignature:[id number]}

## 2021-08-30 NOTE — PROGRESS NOTES
08/30/21 1538   NIV Type   NIV Started/Stopped On   Equipment Type v60   Mode Bilevel   Mask Type Full face mask   Mask Size Small   Settings/Measurements   IPAP 20 cmH20   CPAP/EPAP 8 cmH2O   Rate Ordered 4   Resp 20   FiO2  50 %   Vt Exhaled 518 mL   Minute Volume 16.7 Liters   Mask Leak (lpm) 0 lpm   Comfort Level Good   Using Accessory Muscles No   SpO2 96   Alarm Settings   Alarms On Y   Press Low Alarm 6 cmH2O   High Pressure Alarm 30 cmH2O   Resp Rate Low Alarm 6   High Respiratory Rate 50 br/min

## 2021-08-30 NOTE — PROGRESS NOTES
08/29/21 2210   NIV Type   Skin Assessment Clean, dry, & intact   Skin Protection for O2 Device Yes   NIV Started/Stopped On   Equipment Type v60   Mode Bilevel   Mask Type Full face mask   Mask Size Small   Settings/Measurements   IPAP 20 cmH20   CPAP/EPAP 8 cmH2O   Rate Ordered 4   Resp (!) 35   FiO2  50 %   Vt Exhaled 258 mL   Minute Volume 8.8 Liters   Mask Leak (lpm) 4 lpm   Comfort Level Fair   Using Accessory Muscles Yes   SpO2 97   Alarm Settings   Alarms On Y   Press Low Alarm 6 cmH2O   High Pressure Alarm 30 cmH2O   Delay Alarm 20 sec(s)   Resp Rate Low Alarm 6   High Respiratory Rate 50 br/min

## 2021-08-30 NOTE — ED NOTES
Bed: 03  Expected date:   Expected time:   Means of arrival:   Comments:  [address]       Justine Muhammad, SUJIT  08/29/21 9197

## 2021-08-30 NOTE — ED NOTES
Bedside report given to Rodriguez Silva, [address] [place] [address], RN  08/30/21 0566

## 2021-08-30 NOTE — FLOWSHEET NOTE
08/30/21 0627   Assessment   Charting Type Admission   Neurological   Neuro (WDL) WDL   Level of Consciousness Alert (0)   Tow Coma Scale   Eye Opening 4   Best Verbal Response 5   Best Motor Response 6   Jennifer Coma Scale Score 15   HEENT   HEENT (WDL) WDL   Respiratory   Respiratory (WDL) WDL   Respiratory Pattern Regular   Respiratory Depth Normal   Respiratory Quality/Effort Unlabored   Chest Assessment Chest expansion symmetrical   L Breath Sounds Diminished   R Breath Sounds Diminished   Breath Sounds   Right Upper Lobe Diminished   Right Middle Lobe Diminished   Right Lower Lobe Diminished   Left Upper Lobe Diminished   Left Lower Lobe Diminished   Cardiac   Cardiac (WDL) WDL   Cardiac Monitor   Telemetry Monitor On Yes   Telemetry Audible Yes   Telemetry Alarms Set Yes   Gastrointestinal   Abdominal (WDL) WDL   Peripheral Vascular   Peripheral Vascular (WDL) WDL   Edema None   Skin Color/Condition   Skin Color/Condition (WDL) WDL   Skin Integrity   Skin Integrity (WDL) X   Skin Integrity Bruising   Location scattered    Multiple Skin Integrity Sites Yes   Skin Integrity Site 2   Skin Integrity Location 2 Tear   Location 2 bilat LE    Preventative Dressing Yes   Skin Integrity Site 3   Skin Integrity Location 3 Tear    Location 3 rt arm    Preventative Dressing Yes   Musculoskeletal   Musculoskeletal (WDL) X   RUE Full movement   LUE Full movement   RL Extremity Weakness   LL Extremity Weakness   Genitourinary   Genitourinary (WDL) WDL  (f/c)   Flank Tenderness No   Suprapubic Tenderness No   Dysuria No   Anus/Rectum   Anus/Rectum (WDL) WDL   Urethral Catheter 16 fr   Placement Date/Time: 08/29/21 2230   Urethral Catheter Timeout: Patient;Procedure;Site/Side;Appropriate Equipment  Tube Size (fr): 16 fr  Catheter Balloon Size: 10 mL  Collection Container: Standard  Securement Method: Securing device (Describe)  Urin. ..   $ Urethral catheter insertion $ Not inserted for procedure   Catheter Indications

## 2021-08-30 NOTE — PROGRESS NOTES
08/29/21 2233   NIV Type   Skin Protection for O2 Device Yes   Equipment Type v60   Mode Bilevel   Mask Type Full face mask   Mask Size Small   Settings/Measurements   IPAP 20 cmH20   CPAP/EPAP 8 cmH2O   Rate Ordered 4   Resp (!) 32   FiO2  60 %   Vt Exhaled 388 mL   Minute Volume 10.2 Liters   Mask Leak (lpm) 6 lpm   Comfort Level Fair   Using Accessory Muscles Yes   SpO2 96   Alarm Settings   Alarms On Y   Press Low Alarm 6 cmH2O   High Pressure Alarm 30 cmH2O   Delay Alarm 20 sec(s)   Resp Rate Low Alarm 6   High Respiratory Rate 50 br/min

## 2021-08-30 NOTE — PROGRESS NOTES
08/30/21 1042   NIV Type   NIV Started/Stopped On   Equipment Type v60   Mode Bilevel   Mask Type Full face mask   Mask Size Small   Settings/Measurements   IPAP 20 cmH20   CPAP/EPAP 8 cmH2O   Rate Ordered 4   Resp 20   FiO2  50 %   Vt Exhaled 511 mL   Minute Volume 9.8 Liters   Mask Leak (lpm) 0 lpm   Comfort Level Good   Using Accessory Muscles No   Alarm Settings   Alarms On Y   Press Low Alarm 6 cmH2O   High Pressure Alarm 30 cmH2O   Resp Rate Low Alarm 6   High Respiratory Rate 50 br/min

## 2021-08-30 NOTE — ED PROVIDER NOTES
[address] [facility]  ED  EMERGENCY DEPARTMENTENCOUNTER      Pt Name: Baljit Batista  MRN: [id number]  [place] 1940  Date ofevaluation: 8/29/2021  Provider: Jimena Ayala MD    CHIEF COMPLAINT       Chief Complaint   Patient presents with    Shortness of Breath     nursing home reports sudden obnset of SOB tonight. full code per report from EMS. 70% on 5L when EMS arrived. placed on CPAP and went down to 60%. placed on NRB and given duoneb and albuterol and back up to mid 80s. HISTORY OF PRESENT ILLNESS   (Location/Symptom, Timing/Onset,Context/Setting, Quality, Duration, Modifying Factors, Severity)  Note limiting factors. Baljit Batista is a [de-identified] y.o. female  who  has a past medical history of NADJA (acute kidney injury) ([facility] [address] Asthma, COPD (chronic obstructive pulmonary disease) ([facility] [address] Hyperlipidemia, Hypertension, MRSA (methicillin resistant staph aureus) culture positive, and OA (osteoarthritis). who presents to the emergency department for evaluation of respiratory distress. EMS reports that they were called to the patient's nursing facility due to tachycardia and hypertension. They report that on presentation patient noted to be in moderate respiratory distress with oxygen saturation in the 70s. Patient also noted be tachycardic. She was placed on CPAP was able to tolerate it and eventually placed on a nonrebreather and was satting well. EMS reports that there was no reported recent Covid infections in the nursing facility. They deny any recent illness. On presentation patient appears somnolent but is arousable and following commands. She denies being in physical pain. She does have increased work of breathing with accessory muscle usage. HPI    NursingNotes were reviewed. REVIEW OF SYSTEMS    (2-9 systems for level 4, 10 or more for level 5)     Review of Systems   Unable to perform ROS: Acuity of condition   All other systems reviewed and are negative.       Except as noted above the remainder of the review of systems was reviewed and negative. PAST MEDICAL HISTORY     Past Medical History:   Diagnosis Date    [facility]JA [facility]) ([facility] [address]     Asthma     COPD (chronic obstructive pulmonary disease) ([facility] [address]     Hyperlipidemia     Hypertension     MRSA (methicillin resistant staph aureus) culture positive 09/22/2019    + resp cx    OA (osteoarthritis) 8/12/2014         SURGICALHISTORY       Past Surgical History:   Procedure Laterality Date    BRONCHOSCOPY  09/08/2019    Dr. [facility] - w/BAL   [address]  09/05/2019    Dr. Azael Corbett 2/24/2020    COLONOSCOPY DIAGNOSTIC performed by Demarco Ortiz DO at [address] [place] N/A 9/19/2019    OFF PUMP CORONARY ARTERY BYPASS GRAFTING X2, INTERNAL MAMMARY ARTERY, SAPHENOUS VEIN GRAFT performed by Samantha Camilo MD at [address] [facility] , PARTIAL Left     SIGMOIDOSCOPY  02/27/2020    4 bands    SIGMOIDOSCOPY N/A 2/27/2020    FLEX SIG W/ BANDING SIGMOIDOSCOPY DIAGNOSTIC FLEXIBLE performed by Demarco Ortiz DO at [address]       Previous Medications    ACETAMINOPHEN (TYLENOL) 500 MG TABLET    Take 500 mg by mouth every 6 hours as needed for Pain    ALBUTEROL (PROVENTIL HFA) 108 (90 BASE) MCG/ACT INHALER    Inhale 2 puffs into the lungs every 6 hours as needed for Wheezing or Shortness of Breath.     ATORVASTATIN (LIPITOR) 20 MG TABLET    Take 1 tablet by mouth nightly    BUDESONIDE (PULMICORT) 0.5 MG/2ML NEBULIZER SUSPENSION    Take 2 mLs by nebulization 2 times daily    BUDESONIDE-FORMOTEROL (SYMBICORT) 160-4.5 MCG/ACT AERO    Inhale 2 puffs into the lungs 2 times daily    BUSPIRONE (BUSPAR) 10 MG TABLET    Take 5 mg by mouth 3 times daily as needed Take 1 to 2 tablets 3 times daily PRN    CLOPIDOGREL (PLAVIX) 75 MG TABLET    Take 1 tablet by mouth daily    DOCUSATE SODIUM (COLACE, DULCOLAX) 100 MG CAPS    Take 100 mg by mouth 2 times daily    FERROUS SULFATE (IRON 325) 325 (65 FE) MG TABLET    Take 325 mg by mouth daily (with breakfast)    FUROSEMIDE (LASIX) 20 MG TABLET    Take 20 mg by mouth daily     IPRATROPIUM-ALBUTEROL (DUONEB) 0.5-2.5 (3) MG/3ML SOLN NEBULIZER SOLUTION    Inhale 3 mLs into the lungs every 6 hours    METOPROLOL TARTRATE (LOPRESSOR) 25 MG TABLET    Take 1 tablet by mouth 2 times daily    PANTOPRAZOLE (PROTONIX) 20 MG TABLET    Take 20 mg by mouth daily    POTASSIUM CHLORIDE (KLOR-CON) 20 MEQ PACKET    Take 20 mEq by mouth daily    PREDNISONE (DELTASONE) 10 MG TABLET    3 daily for 3 days, 2 daily for 3 days, 1 daily for 3 days. ROFLUMILAST (DALIRESP) 250 MCG TABLET    Take 250 mcg by mouth daily    SERTRALINE (ZOLOFT) 50 MG TABLET    Take 50 mg by mouth daily    TIOTROPIUM (SPIRIVA HANDIHALER) 18 MCG INHALATION CAPSULE    Inhale 18 mcg into the lungs daily            Latex and Codeine    FAMILY HISTORY       Family History   Problem Relation Age of Onset    Cancer Mother     Heart Disease Father     Stroke Father     Heart Disease Sister     Stroke Sister           SOCIAL HISTORY       Social History     Socioeconomic History    Marital status:       Spouse name: None    Number of children: None    Years of education: None    Highest education level: None   Occupational History    None   Tobacco Use    Smoking status: Former Smoker     Packs/day: 0.50     Years: 50.00     Pack years: 25.00     Types: Cigarettes     Quit date: 2019     Years since quittin.9    Smokeless tobacco: Never Used    Tobacco comment: advised to quit   Vaping Use    Vaping Use: Never assessed   Substance and Sexual Activity    Alcohol use: No    Drug use: No    Sexual activity: Not Currently     Comment: elio Montague Day   Other Topics Concern    None   Social History Narrative    None     Social Determinants of Health     Financial Resource Strain:  Difficulty of Paying Living Expenses:    Food Insecurity:     Worried About Running Out of Food in the Last Year:     Ran Out of Food in the Last Year:    Transportation Needs:     Lack of Transportation (Medical):  Lack of Transportation (Non-Medical):    Physical Activity:     Days of Exercise per Week:     Minutes of Exercise per Session:    Stress:     Feeling of Stress :    Social Connections:     Frequency of Communication with Friends and Family:     Frequency of Social Gatherings with Friends and Family:     Attends Nondenominational Services:     Active Member of Clubs or Organizations:     Attends Club or Organization Meetings:     Marital Status:    Intimate Partner Violence:     Fear of Current or Ex-Partner:     Emotionally Abused:     Physically Abused:     Sexually Abused:        SCREENINGS             PHYSICAL EXAM    (up to 7 for level 4, 8 or more for level 5)     ED Triage Vitals   BP Temp Temp src Pulse Resp SpO2 Height Weight   08/29/21 2203 08/29/21 2205 -- 08/29/21 2203 08/29/21 2205 08/29/21 2200 08/29/21 2203 08/29/21 2203   (!) 185/112 98.3 °F (36.8 °C)  144 29 97 % 5' 2\" (1.575 m) 115 lb (52.2 kg)       Physical Exam  Vitals and nursing note reviewed. Constitutional:       Appearance: She is well-developed. HENT:      Head: Normocephalic and atraumatic. Eyes:      Conjunctiva/sclera: Conjunctivae normal.      Pupils: Pupils are equal, round, and reactive to light. Neck:      Trachea: No tracheal deviation. Cardiovascular:      Rate and Rhythm: Regular rhythm. Tachycardia present. Extrasystoles are present. Pulses: Normal pulses. Heart sounds: Normal heart sounds. Pulmonary:      Effort: Tachypnea, accessory muscle usage and respiratory distress present. Breath sounds: Wheezing and rales present. Chest:      Chest wall: No crepitus. Abdominal:      General: There is no distension. Palpations: Abdomen is soft. Tenderness:  There is no abdominal tenderness. Musculoskeletal:         General: Normal range of motion. Cervical back: Normal range of motion. Right lower leg: No edema. Left lower leg: No edema. Skin:     General: Skin is warm and dry. Capillary Refill: Capillary refill takes less than 2 seconds. Neurological:      General: No focal deficit present. Mental Status: She is alert and oriented to person, place, and time. RESULTS     EKG: All EKG's are interpreted by the Emergency Department Physician who either signs or Co-signsthis chart in the absence of a cardiologist.    EKG shows sinus rhythm with a ventricular rate of 143 bpm.  PA interval and QTc interval within normal limits. Patient has left axis deviation. There are no significant ST elevations or depressions EKG is nondiagnostic for ACS. Compared EKG from 6-2-2021 there are nonspecific ST changes to the inferior anterolateral leads. RADIOLOGY:   Non-plain filmimages such as CT, Ultrasound and MRI are read by the radiologist. Plain radiographic images are visualized and preliminarily interpreted by the emergency physician with the below findings:        Interpretation per the Radiologist below, if available at the time ofthis note:    XR CHEST PORTABLE   Final Result   Diffuse bilateral airspace disease reflecting interstitial edema versus   pneumonia. Small bilateral pleural effusions.                ED BEDSIDE ULTRASOUND:   Performed by ED Physician - none    LABS:  Labs Reviewed   BLOOD GAS, VENOUS - Abnormal; Notable for the following components:       Result Value    pH, Zach 7.159 (*)     pCO2, Zach 92.1 (*)     pO2, Zach 184.0 (*)     HCO3, Venous 32.0 (*)     All other components within normal limits    Narrative:     Mansi Mondragon tel. [phone],  Chemistry results called to and read back by SUJIT Price, 08/29/2021 22:24,  by Sohail Magaña  Performed at:  [address] [place] [address]   Phone [phone]   CBC WITH AUTO DIFFERENTIAL - Abnormal; Notable for the following components:    WBC 13.2 (*)     RBC 3.34 (*)     Hemoglobin 9.9 (*)     Hematocrit 31.4 (*)     RDW 18.8 (*)     Neutrophils Absolute 11.1 (*)     All other components within normal limits    Narrative:     Performed at:  [address], [facility]   Phone [phone]   COMPREHENSIVE METABOLIC PANEL W/ REFLEX TO MG FOR LOW K - Abnormal; Notable for the following components:    Glucose 285 (*)     GFR Non- 53 (*)     All other components within normal limits    Narrative:     [facility]  [address] [facility]   Phone [phone]   LIPASE - Abnormal; Notable for the following components:    Lipase 102.0 (*)     All other components within normal limits    Narrative:     Performed at:  [address], [place]   Phone [phone] - Abnormal; Notable for the following components:    Pro-BNP 6,133 (*)     All other components within normal limits    Narrative:     Performed at:  [address]  [place], [facility] [employer]   Phone [phone]   TROPONIN - Abnormal; Notable for the following components:    Troponin 0.03 (*)     All other components within normal limits    Narrative:     Performed at:  [address], [facility]   Phone [phone]   URINE RT REFLEX TO CULTURE - Abnormal; Notable for the following components:    Protein, UA 30 (*)     All other components within normal limits    Narrative:     Performed at:  [address], [place]   Phone [phone]   MICROSCOPIC URINALYSIS - Abnormal; Notable for the following components:    Bacteria, UA Rare (*)     Yeast, UA Present (*)     All other components within normal limits    Narrative:     Performed at:  [facility]) [address], [facility]   Phone [phone], RAPID    Narrative:     Performed at:  [facility]) [address], [facility]   Phone [phone]   CULTURE, BLOOD 1   CULTURE, BLOOD 2   LACTATE, SEPSIS    Narrative:     Performed at:  [facility]) [address], [facility]   Phone [phone]   URINE RT REFLEX TO CULTURE   LACTATE, SEPSIS       All other labs were within normal range or not returned as of this dictation. EMERGENCY DEPARTMENT COURSE and DIFFERENTIAL DIAGNOSIS/MDM:   Vitals:    Vitals:    08/29/21 2210 08/29/21 2230 08/29/21 2233 08/29/21 2240   BP:  (!) 173/128     Pulse:  146  144   Resp: (!) 35 27 (!) 32 (!) 32   Temp:       SpO2: 97% 94%  98%   Weight:       Height:           Patient was given thefollowing medications:  Medications   0.9 % sodium chloride bolus (has no administration in time range)   furosemide (LASIX) injection 40 mg (has no administration in time range)   vancomycin 1000 mg IVPB in 250 mL D5W addavial (has no administration in time range)   cefepime (MAXIPIME) 1000 mg IVPB minibag (has no administration in time range)       ED COURSE & MEDICAL DECISION MAKING    Pertinent Labs & Imaging studies reviewed. (See chart for details)   -  Patient seen and evaluated in the emergency department. -  Triage and nursing notes reviewed and incorporated. -  Old chart records reviewed and incorporated.   -  Differential diagnosis includes: Differential Diagnosis: Acute Coronary Syndrome, Congestive Heart Failure, Myocardial Infarction, Pulmonary Embolus, Pneumonia, Pneumothorax, other    -  Work-up included:  See above  -  ED treatment included: See above  -  Results discussed with patient. Patient presents the ED for evaluation of respiratory distress. She is satting well on our breather but has increased respiratory effort. She has very coarse lung sounds with diffuse rales. Patient was placed on BiPAP with improvement of her respiratory effort and mentation. Labs show significantly elevated BNP, VBG shows respiratory acidosis with hypercapnia. Imaging studies show pulmonary edema versus infiltrate. Rapid Covid negative. Patient does have an elevated troponin, but appears to have intermittent elevations. EKG nondiagnostic for ACS. Patient does have leukocytosis. She does meet sepsis criteria but lactate is within normal limits. She was started on empiric antibiotics and given Lasix. Patient feels improved on reevaluation. She will be admitted the hospital for the medical management evaluation. The patient is agreeable with plan of care and disposition. REASSESSMENT          CRITICAL CARE TIME   Total Critical Care time was 35 minutes, excluding separately reportable procedures. There was a high probability of clinically significant/life threatening deterioration in the patient's condition which required my urgent intervention. CONSULTS:  PHARMACY TO DOSE VANCOMYCIN    PROCEDURES:  Unless otherwise noted below, none     Procedures    FINAL IMPRESSION      1. Acute on chronic respiratory failure with hypoxia and hypercapnia (HCC)    2. Congestive heart failure, unspecified HF chronicity, unspecified heart failure type ([facility] [address]          DISPOSITION/PLAN   DISPOSITION        PATIENT REFERREDTO:  No follow-up provider specified.     DISCHARGEMEDICATIONS:  New Prescriptions    No medications on file          (Please note that portions of this note were completed with a voice recognition program.  Efforts were made to edit the dictations but occasionally words are mis-transcribed.)    Ariadne Arevalo MD (electronically signed)  Attending Emergency Physician          Clement Nuñez

## 2021-08-30 NOTE — ED NOTES
Dr. Alicia Caal perfectserved about patient's blood pressure.       Brenton Santos RN  08/30/21 [id number]

## 2021-08-30 NOTE — PROGRESS NOTES
Patient admitted to room 436 from ed. Patient oriented to room, call light, bed rails, phone, lights and bathroom. Patient instructed about the schedule of the day including: vital sign frequency, lab draws, possible tests, frequency of MD and staff rounds, including RN/MD rounding together at bedside, daily weights, and I &O's. Patient instructed about prescribed diet, how to use 8MENU, and television. bed alarm in place, patient aware of placement and reason. Telemetry box  in place, patient aware of placement and reason. Bed locked, in lowest position, side rails up 2/4, call light within reach. Will continue to monitor.

## 2021-08-30 NOTE — CARE COORDINATION
CASE MANAGEMENT INITIAL ASSESSMENT      Reviewed chart and completed assessment via telephone with:Daughter Nora Shows on phone  Explained Case Management role/services. yes    Primary contact information:    Health Care Decision Maker :   Primary Decision Maker: Ronnie Bennett - Child - [phone]          Can this person be reached and be able to respond quickly, such as within a few minutes or hours? Yes  Who would be your back-up decision maker? Name   Phone Number:    [phone] date/status:/30/21  [address] fail- R/O Covid   Is this a Readmission?:  No      Insurance:[facility]   Precert required for SNF: No       3 night stay required: No    Living arrangements, Adls, care needs, prior to admission:From [facility] Skilled stay- but prior to that lived at home. Son stays w pt during the week- dtr stays on weekends but just at night-pt home alone during the daytime hours. Transportation:Family    Durable Medical Equipment at home:  Walker__Cane__RTS__ BSC__Shower Chair__  xHHN__ CPAP__  BiPap__  Hospital Bed__ W/C___ Other__________    Services in the home and/or outpatient, prior to admission:Has been active w [facility] in the past- unsure of agency. Has home O2- unsure of provider- call to [facility]- they are active w pt for O2.    ·     PT/OT recs:NA    Hospital Exemption Notification (HEN):Not if return to skilled stay- CM following to determine. Barriers to discharge:Poss COVID    Plan/comments: To return to [facility] if possible. Call to facility to determine pt's status there- message left. CM will follow. ECOC on chart for MD signature    Dahiana Khalil, RN    Return call received from [facility]- pt is there on a skilled stay and can accept back pending needs and Covid status. Continue to follow.   Dahiana Khalil, RN

## 2021-08-30 NOTE — H&P
Hospital Medicine History & Physical      PCP: Kianna Bridges MD    Date of Admission: 8/29/2021    Date of Service: Pt seen/examined on 08/30/2021  Pt seen/examined face to face on and admitted as inpatient with expected LOS greater than two midnights due to medical therapy  Chief Complaint:    Chief Complaint   Patient presents with    Shortness of Breath     nursing home reports sudden obnset of SOB tonight. full code per report from EMS. 70% on 5L when EMS arrived. placed on CPAP and went down to 60%. placed on NRB and given duoneb and albuterol and back up to mid 80s. History Of Present Illness:      [de-identified] y.o. female who presented to [facility] with past medical history of asthma, hypertension, hyperlipidemia, COPD oxygen dependent on 2 L who presents to the ED for worsening shortness of breath. Patient was at the nursing facility and then was called via EMS due to patient having tachycardia and hypertension. Patient was noted to be saturating in the 70% with no known alleviating exacerbating factor. Patient reported that she has been having some cough no phlegm and no fever abdominal pain chest pain dysuria although history is limited given patient continues to be on BiPAP.   In the ED patient was put on BiPAP    Past Medical History:          Diagnosis Date    NADJA (acute kidney injury) ([facility] [address]     Asthma     COPD (chronic obstructive pulmonary disease) ([facility] [address]     Hyperlipidemia     Hypertension     MRSA (methicillin resistant staph aureus) culture positive 09/22/2019    + resp cx    OA (osteoarthritis) 8/12/2014       Past Surgical History:          Procedure Laterality Date    BRONCHOSCOPY  09/08/2019    Dr. Shailesh Noguera - w/BAL    CARDIAC CATHETERIZATION  09/05/2019    Dr. Eduardo Murray 2/24/2020    COLONOSCOPY DIAGNOSTIC performed by Gabriel Butler DO at [facility] [place], operated by [facility] [address] 9/19/2019    OFF PUMP CORONARY ARTERY BYPASS GRAFTING X2, INTERNAL MAMMARY ARTERY, SAPHENOUS VEIN GRAFT performed by Felicity Colón MD at [facility], PARTIAL Left     SIGMOIDOSCOPY  02/27/2020    4 bands    SIGMOIDOSCOPY N/A 2/27/2020    FLEX SIG W/ BANDING SIGMOIDOSCOPY DIAGNOSTIC FLEXIBLE performed by Dominic Fowler DO at [address] [place] [address]       Medications Prior to Admission:      Prior to Admission medications    Medication Sig Start Date End Date Taking? Authorizing Provider   predniSONE (DELTASONE) 10 MG tablet 3 daily for 3 days, 2 daily for 3 days, 1 daily for 3 days.  6/4/21   Johanna Muniz MD   busPIRone (BUSPAR) 10 MG tablet Take 5 mg by mouth 3 times daily as needed Take 1 to 2 tablets 3 times daily PRN    Historical Provider, MD   pantoprazole (PROTONIX) 20 MG tablet Take 20 mg by mouth daily    Historical Provider, MD   furosemide (LASIX) 20 MG tablet Take 20 mg by mouth daily mon wed fri    Historical Provider, MD   ferrous sulfate (IRON 325) 325 (65 Fe) MG tablet Take 325 mg by mouth daily (with breakfast)    Historical Provider, MD   potassium chloride (KLOR-CON) 20 MEQ packet Take 20 mEq by mouth daily    Historical Provider, MD   Roflumilast (DALIRESP) 250 MCG tablet Take 250 mcg by mouth daily    Historical Provider, MD   acetaminophen (TYLENOL) 500 MG tablet Take 500 mg by mouth every 6 hours as needed for Pain    Historical Provider, MD   sertraline (ZOLOFT) 50 MG tablet Take 50 mg by mouth daily    Historical Provider, MD   budesonide (PULMICORT) 0.5 MG/2ML nebulizer suspension Take 2 mLs by nebulization 2 times daily 9/24/19   Felicity Colón MD   ipratropium-albuterol (DUONEB) 0.5-2.5 (3) MG/3ML SOLN nebulizer solution Inhale 3 mLs into the lungs every 6 hours 9/24/19   Felicity Colón MD   atorvastatin (LIPITOR) 20 MG tablet Take 1 tablet by mouth nightly 9/24/19   Felicity Colón MD   metoprolol tartrate (LOPRESSOR) 25 MG tablet Take 1 tablet by mouth 2 times daily 9/24/19   Felicity Colón MD docusate sodium (COLACE, DULCOLAX) 100 MG CAPS Take 100 mg by mouth 2 times daily 9/24/19   Yolanda Chase MD   clopidogrel (PLAVIX) 75 MG tablet Take 1 tablet by mouth daily 9/7/19   Katie Arguello MD   tiotropium (SPIRIVA HANDIHALER) 18 MCG inhalation capsule Inhale 18 mcg into the lungs daily 7/25/16   Historical Provider, MD   budesonide-formoterol (SYMBICORT) 160-4.5 MCG/ACT AERO Inhale 2 puffs into the lungs 2 times daily    Historical Provider, MD   albuterol (PROVENTIL HFA) 108 (90 BASE) MCG/ACT inhaler Inhale 2 puffs into the lungs every 6 hours as needed for Wheezing or Shortness of Breath. 12/20/12   Shorty Dial MD       Allergies:  Latex and Codeine    Social History:          TOBACCO:   reports that she quit smoking about 1 years ago. Her smoking use included cigarettes. She has a 25.00 pack-year smoking history. She has never used smokeless tobacco.  ETOH:   reports no history of alcohol use. E-Cigarettes/Vaping Use       Questions Responses    E-Cigarette/Vaping Use Never Assessed    Start Date     Passive Exposure     Quit Date     Counseling Given     Comments               Family History:      Reviewed in detail         Problem Relation Age of Onset    Cancer Mother     Heart Disease Father     Stroke Father     Heart Disease Sister     Stroke Sister        REVIEW OF SYSTEMS:     Constitutional:  No Fever, No Chills, No Night Sweats  ENT/Mouth:  No Nasal Congestion,  No Hoarseness, No new mouth lesion  Eyes:  No Eye Pain, No Redness, No Discharge  Cardiovascular:  No Chest Pain, No Orthopnea, No Palpitations  Respiratory: + Cough, No Sputum, + dyspnea  Gastrointestinal: No Vomiting, No Diarrhea, No abdominal pain  Genitourinary: No Urinary Frequency, No Hematuria, No Urinary pain  Musculoskeletal:  No worsening Arthralgias, No worsening Myalgias  Skin:  No new Skin Lesions, No new skin rash  Neuro:  No new weakness, No new numbness.   Psych:  No suicial ideation, No Violence ideation    PHYSICAL EXAM PERFORMED:    BP (!) 77/59   Pulse 92   Temp 98.3 °F (36.8 °C)   Resp 24   Ht 5' 2\" (1.575 m)   Wt 115 lb (52.2 kg)   SpO2 99%   BMI 21.03 kg/m²     General appearance:  mild acute distress, appears older than stated age  HEENT:   atraumatic, sclera anicteric, Conjunctivae clear. Neck: Supple,Trachea midline, no goiter  Respiratory:minimal accessory muscle usage, Normal respiratory effort. bilaterally expiratory wheezing  Cardiovascular:  Regular rate and rhythm, capillary refill 2 seconds  Abdomen: Soft, non-tender, non-distended with normal bowel sounds. Musculoskeletal:  No clubbing, cyanosis. trace edema LE bilaterally. Skin: turgor normal.  No new rashes or lesions. Neurologic: Alert and oriented x4, no new focal sensory/motor deficits. Labs:     Recent Labs     08/29/21 2205   WBC 13.2*   HGB 9.9*   HCT 31.4*        Recent Labs     08/29/21 2205      K 4.9      CO2 30   BUN 15   CREATININE 1.0   CALCIUM 9.2     Recent Labs     08/29/21 2205   AST 20   ALT 16   BILITOT 0.4   ALKPHOS 123     No results for input(s): INR in the last 72 hours. Recent Labs     08/29/21 2205   TROPONINI 0.03*       Urinalysis:      Lab Results   Component Value Date    NITRU Negative 08/29/2021    WBCUA 3-5 08/29/2021    BACTERIA Rare 08/29/2021    RBCUA 3-4 08/29/2021    BLOODU Negative 08/29/2021    SPECGRAV 1.020 08/29/2021    GLUCOSEU Negative 08/29/2021       Radiology:     CXR: I have reviewed the CXR with the following interpretation:   Bilateral airspace interstitial pneumonia  EKG:  I have reviewed the EKG with the following interpretation:   Sinus tachycardia    XR CHEST PORTABLE   Final Result   Diffuse bilateral airspace disease reflecting interstitial edema versus   pneumonia. Small bilateral pleural effusions.              ASSESSMENT AND PLAN:    Active Hospital Problems    Diagnosis Date Noted    Acute respiratory failure ([facility] [address] [J96.00] 08/30/2021 Acute mixed respiratory failure:  Secondary to COPD exacerbation in the setting of interstitial pneumonia  Responding to BiPAP therapy  Continue to monitor    Acute COPD exacerbation:  Steroids, duo nebs, currently on Vanco and cefepime    Sepsis secondary to pneumonia:  bcx obtained, Lactic acid 1.8  Procal pending  Strep antigen, Legionella, MRSA, blood cultures pending  On Vanco and cefepime    Pneumonia: Evident on chest x-ray  Unclear if this is interstitial disease  Vanc and cefepime to cover for hospital associated pneumonia    NSTEMI:  Etiology suspect from sepsis?   Echo pending  Aspirin given  Low dose heparin  Cardiology consulted    Chronic medical conditions:  Uncontrolled type 2 diabetes: ISS  Anxiety: continue home medications  CAD: Continue Plavix    Diet: NPO except meds ordered    DVT Prophylaxis: Heparin    Dispo:   Expected LOS greater than two Betzy 1153, DO

## 2021-08-30 NOTE — RT PROTOCOL NOTE
RT Inhaler-Nebulizer Bronchodilator Protocol Note    There is a bronchodilator order in the chart from a provider indicating to follow the RT Bronchodilator Protocol and there is an Initiate RT Bronchodilator Protocol order as well (see protocol at bottom of note). The findings from the last RT Protocol Assessment were as follows:  Smoking: >15 Pack years  Surgical Status: No surgery  Xray: Multiple lobe infiltrates/atelectasis/pleural effusion/edema  Respiratory Pattern: RR <12 or 21-30  Mental Status: Confused but follows commands  Breath Sounds: Diminished and/or crackles  Cough: Weak, non-productive  Activity Level: Mostly sedentary, minimal walking  Oxygen Requirement: 29% or 3LNC - 5LNC/40%  Indication for Bronchodilator Therapy: On home bronchodilators  Bronchodilator Assessment Score: 10    Aerosolized bronchodilator medication orders have been revised according to the RT Bronchodilator Protocol. RT Inhaler-Nebulizer Bronchodilator Protocol:    Respiratory Therapist to perform RT Therapy Protocol Assessment then follow the protocol. No Indications - adjust the frequency to every 6 hours PRN wheezing or bronchospasm, if no treatments needed after 48 hours then discontinue using Per Protocol order mode. If indication present, adjust the RT bronchodilator orders based on the Bronchodilator Assessment Score as follows:    0-6 - enter or revise RT bronchodilator order to Albuterol Inhaler order with frequency of every 2 hours PRN for wheezing or increased work of breathing using Per Protocol order mode. If Albuterol Inhaler not tolerated or not effective, then discontinue the Albuterol Inhaler order and enter Albuterol Nebulizer order with same frequency and PRN reasons. Repeat RT Therapy Protocol Assessment as needed.     7-10 - discontinue any other Inpatient aerosolized bronchodilator medication orders and enter or revise two Albuterol Inhaler orders, one with BID frequency and one with frequency of every 2 hours PRN wheezing or increased work of breathing using Per Protocol order mode. Repeat RT Therapy Protocol Assessment with second treatment then BID and as needed. If Albuterol Inhaler not tolerated or not effective, then discontinue the Albuterol Inhaler orders and enter two Albuterol Nebulizer orders with same frequencies and PRN reasons. 11-13 - discontinue any other Inpatient aerosolized bronchodilator medication orders and enter DuoNeb Nebulizer orders QID frequency and an Albuterol Nebulizer order every 2 hours PRN wheezing or increased work of breathing using Per Protocol order mode. Repeat RT Therapy Protocol Assessment with second treatment then QID and as needed. Greater than 13 - discontinue any other Inpatient bronchodilator aerosolized medication orders and enter DuoNeb Nebulizer order every 4 hours frequency and Albuterol Nebulizer every 2 hours PRN wheezing or increased work of breathing using Per Protocol order mode. Repeat RT Therapy Protocol Assessment with second treatment then every 4 hours and as needed. RT to enter RT Home Evaluation for COPD & MDI Assessment order using Per Protocol order mode.     Electronically signed by Elliot Sanabria RCP on 8/30/2021 at 4:47 AM

## 2021-08-31 PROBLEM — E43 SEVERE MALNUTRITION (HCC): Chronic | Status: ACTIVE | Noted: 2021-08-31

## 2021-08-31 LAB
A/G RATIO: 1.1 (ref 1.1–2.2)
ALBUMIN SERPL-MCNC: 2.8 G/DL (ref 3.4–5)
ALP BLD-CCNC: 74 U/L (ref 40–129)
ALT SERPL-CCNC: 10 U/L (ref 10–40)
ANION GAP SERPL CALCULATED.3IONS-SCNC: 6 MMOL/L (ref 3–16)
APTT: 59.7 SEC (ref 26.2–38.6)
APTT: 63.6 SEC (ref 26.2–38.6)
AST SERPL-CCNC: 13 U/L (ref 15–37)
BASOPHILS ABSOLUTE: 0 K/UL (ref 0–0.2)
BASOPHILS RELATIVE PERCENT: 0.2 %
BILIRUB SERPL-MCNC: 0.3 MG/DL (ref 0–1)
BUN BLDV-MCNC: 24 MG/DL (ref 7–20)
CALCIUM SERPL-MCNC: 8.5 MG/DL (ref 8.3–10.6)
CHLORIDE BLD-SCNC: 106 MMOL/L (ref 99–110)
CO2: 28 MMOL/L (ref 21–32)
CREAT SERPL-MCNC: 1.2 MG/DL (ref 0.6–1.2)
EOSINOPHILS ABSOLUTE: 0.1 K/UL (ref 0–0.6)
EOSINOPHILS RELATIVE PERCENT: 1 %
GFR AFRICAN AMERICAN: 52
GFR NON-AFRICAN AMERICAN: 43
GLOBULIN: 2.5 G/DL
GLUCOSE BLD-MCNC: 102 MG/DL (ref 70–99)
GLUCOSE BLD-MCNC: 103 MG/DL (ref 70–99)
GLUCOSE BLD-MCNC: 123 MG/DL (ref 70–99)
GLUCOSE BLD-MCNC: 93 MG/DL (ref 70–99)
GLUCOSE BLD-MCNC: 99 MG/DL (ref 70–99)
HCT VFR BLD CALC: 21.7 % (ref 36–48)
HEMOGLOBIN: 7.2 G/DL (ref 12–16)
L. PNEUMOPHILA SEROGP 1 UR AG: NORMAL
LV EF: 58 %
LVEF MODALITY: NORMAL
LYMPHOCYTES ABSOLUTE: 1.5 K/UL (ref 1–5.1)
LYMPHOCYTES RELATIVE PERCENT: 17.5 %
MCH RBC QN AUTO: 32.2 PG (ref 26–34)
MCHC RBC AUTO-ENTMCNC: 33 G/DL (ref 31–36)
MCV RBC AUTO: 97.7 FL (ref 80–100)
MONOCYTES ABSOLUTE: 0.5 K/UL (ref 0–1.3)
MONOCYTES RELATIVE PERCENT: 5.2 %
MRSA SCREEN RT-PCR: NORMAL
NEUTROPHILS ABSOLUTE: 6.7 K/UL (ref 1.7–7.7)
NEUTROPHILS RELATIVE PERCENT: 76.1 %
PDW BLD-RTO: 18.5 % (ref 12.4–15.4)
PERFORMED ON: ABNORMAL
PERFORMED ON: ABNORMAL
PERFORMED ON: NORMAL
PERFORMED ON: NORMAL
PLATELET # BLD: 243 K/UL (ref 135–450)
PMV BLD AUTO: 7.9 FL (ref 5–10.5)
POTASSIUM REFLEX MAGNESIUM: 3.7 MMOL/L (ref 3.5–5.1)
RBC # BLD: 2.22 M/UL (ref 4–5.2)
SODIUM BLD-SCNC: 140 MMOL/L (ref 136–145)
STREP PNEUMONIAE ANTIGEN, URINE: NORMAL
TOTAL PROTEIN: 5.3 G/DL (ref 6.4–8.2)
WBC # BLD: 8.8 K/UL (ref 4–11)

## 2021-08-31 PROCEDURE — 94761 N-INVAS EAR/PLS OXIMETRY MLT: CPT

## 2021-08-31 PROCEDURE — 36415 COLL VENOUS BLD VENIPUNCTURE: CPT

## 2021-08-31 PROCEDURE — 6370000000 HC RX 637 (ALT 250 FOR IP): Performed by: NURSE PRACTITIONER

## 2021-08-31 PROCEDURE — 2060000000 HC ICU INTERMEDIATE R&B

## 2021-08-31 PROCEDURE — 85025 COMPLETE CBC W/AUTO DIFF WBC: CPT

## 2021-08-31 PROCEDURE — 99222 1ST HOSP IP/OBS MODERATE 55: CPT | Performed by: INTERNAL MEDICINE

## 2021-08-31 PROCEDURE — 94660 CPAP INITIATION&MGMT: CPT

## 2021-08-31 PROCEDURE — 80053 COMPREHEN METABOLIC PANEL: CPT

## 2021-08-31 PROCEDURE — 2700000000 HC OXYGEN THERAPY PER DAY

## 2021-08-31 PROCEDURE — 94640 AIRWAY INHALATION TREATMENT: CPT

## 2021-08-31 PROCEDURE — 2580000003 HC RX 258: Performed by: INTERNAL MEDICINE

## 2021-08-31 PROCEDURE — 6360000002 HC RX W HCPCS: Performed by: INTERNAL MEDICINE

## 2021-08-31 PROCEDURE — 93306 TTE W/DOPPLER COMPLETE: CPT

## 2021-08-31 PROCEDURE — 85730 THROMBOPLASTIN TIME PARTIAL: CPT

## 2021-08-31 PROCEDURE — 80202 ASSAY OF VANCOMYCIN: CPT

## 2021-08-31 RX ORDER — ACETAMINOPHEN 325 MG/1
650 TABLET ORAL EVERY 4 HOURS PRN
Status: DISCONTINUED | OUTPATIENT
Start: 2021-08-31 | End: 2021-09-03 | Stop reason: HOSPADM

## 2021-08-31 RX ORDER — BUSPIRONE HYDROCHLORIDE 5 MG/1
5 TABLET ORAL 3 TIMES DAILY PRN
Status: DISCONTINUED | OUTPATIENT
Start: 2021-08-31 | End: 2021-09-03 | Stop reason: HOSPADM

## 2021-08-31 RX ADMIN — Medication 2 PUFF: at 08:03

## 2021-08-31 RX ADMIN — Medication 2 PUFF: at 16:08

## 2021-08-31 RX ADMIN — Medication 2 PUFF: at 12:19

## 2021-08-31 RX ADMIN — Medication 2 PUFF: at 19:35

## 2021-08-31 RX ADMIN — Medication 2 PUFF: at 16:15

## 2021-08-31 RX ADMIN — SODIUM CHLORIDE, PRESERVATIVE FREE 10 ML: 5 INJECTION INTRAVENOUS at 09:39

## 2021-08-31 RX ADMIN — CEFEPIME 2000 MG: 2 INJECTION, POWDER, FOR SOLUTION INTRAMUSCULAR; INTRAVENOUS at 12:13

## 2021-08-31 RX ADMIN — BUSPIRONE HYDROCHLORIDE 5 MG: 5 TABLET ORAL at 21:18

## 2021-08-31 RX ADMIN — ACETAMINOPHEN 650 MG: 325 TABLET, FILM COATED ORAL at 21:18

## 2021-08-31 ASSESSMENT — PAIN SCALES - GENERAL: PAINLEVEL_OUTOF10: 5

## 2021-08-31 NOTE — CONSULTS
[facility]  [phone]      Attending Physician: Santino Chandra MD  Reason for Consultation/Chief Complaint: Shortness of breath    Subjective   History of Present Illness:  Juan Ragland is a [de-identified] y.o. patient who presented to the hospital with complaints of shortness of breath on the day of admission, he says him on somewhat suddenly on the day of presentation which was in August 29, 2021, patient says she sat up on the side of the bed and began experience shortness of breath without any chest pain or palpitations or dizziness but then she said she did feel as if she may have passed out. There was nobody nearby to confirm symptoms. She presented to the emergency room and was admitted to the hospital in August 29, 2021, she was felt to have a COPD exacerbation and she has been improving. She says typically she is on 3 L nasal cannula of oxygen. She had serial troponin levels drawn which were 0.03, 0.07 and 0.06.  proBNP level was 6133. She is placed on heparin drip for possible non-STEMI. Patient has a cardiac history which dates back to 2019, had non-STEMI at that time, had cardiac catheterization and was found to have left main disease, she ultimately underwent CABG surgery in September 2019 with Dr. Misty Rose off-pump CABG with a LIMA to LAD as well as saphenous vein graft to diagonal branch. She is been following with Dr. Larisa Schmidt. Last office visit was in 2020, she was felt to be stable with regard to chronic medical conditions including hypertension, hypercholesterolemia, CAD/ASHD, status post CABG. she has been noted to have severe COPD with O2 dependence as noted above. Work-up was otherwise been remarkable for initial hemoglobin of 9.9 and hemoglobin today is 7.2.     Past Medical History:   has a past medical history of NADJA (acute kidney injury) ([facility] Asthma, COPD (chronic obstructive pulmonary disease) ([facility] Hyperlipidemia, Hypertension, MRSA (methicillin resistant staph aureus) culture positive, and OA (osteoarthritis). Surgical History:   has a past surgical history that includes  section; Mastectomy, partial (Left); bronchoscopy (2019); Cardiac catheterization (2019); Coronary artery bypass graft (N/A, 2019); Colonoscopy (N/A, 2020); Sigmoidoscopy (2020); and sigmoidoscopy (N/A, 2020). Social History:   reports that she quit smoking about 1 years ago. Her smoking use included cigarettes. She has a 25.00 pack-year smoking history. She has never used smokeless tobacco. She reports that she does not drink alcohol and does not use drugs. Family History:  family history includes Cancer in her mother; Heart Disease in her father and sister; Stroke in her father and sister. Home Medications:  Were reviewed and are listed in nursing record and/or below  Prior to Admission medications    Medication Sig Start Date End Date Taking? Authorizing Provider   predniSONE (DELTASONE) 10 MG tablet 3 daily for 3 days, 2 daily for 3 days, 1 daily for 3 days.  21   Alexsandra Wang MD   busPIRone (BUSPAR) 10 MG tablet Take 5 mg by mouth 3 times daily as needed Take 1 to 2 tablets 3 times daily PRN    Historical Provider, MD   pantoprazole (PROTONIX) 20 MG tablet Take 20 mg by mouth daily    Historical Provider, MD   furosemide (LASIX) 20 MG tablet Take 20 mg by mouth daily     Historical Provider, MD   ferrous sulfate (IRON 325) 325 (65 Fe) MG tablet Take 325 mg by mouth daily (with breakfast)    Historical Provider, MD   potassium chloride (KLOR-CON) 20 MEQ packet Take 20 mEq by mouth daily    Historical Provider, MD   Roflumilast (DALIRESP) 250 MCG tablet Take 250 mcg by mouth daily    Historical Provider, MD   acetaminophen (TYLENOL) 500 MG tablet Take 500 mg by mouth every 6 hours as needed for Pain    Historical Provider, MD   sertraline (ZOLOFT) 50 MG tablet Take 50 mg by mouth daily    Historical Provider, MD   budesonide (PULMICORT) 0.5 MG/2ML nebulizer suspension Take 2 mLs by nebulization 2 times daily 9/24/19   Samantha Camilo MD   ipratropium-albuterol (DUONEB) 0.5-2.5 (3) MG/3ML SOLN nebulizer solution Inhale 3 mLs into the lungs every 6 hours 9/24/19   Samantha Camilo MD   atorvastatin (LIPITOR) 20 MG tablet Take 1 tablet by mouth nightly 9/24/19   Samantha Camilo MD   metoprolol tartrate (LOPRESSOR) 25 MG tablet Take 1 tablet by mouth 2 times daily 9/24/19   Samantha Camilo MD   docusate sodium (COLACE, DULCOLAX) 100 MG CAPS Take 100 mg by mouth 2 times daily 9/24/19   Yolanda Cabrera MD   clopidogrel (PLAVIX) 75 MG tablet Take 1 tablet by mouth daily 9/7/19   Dimas Dumas MD   tiotropium (Classie [place]) 18 MCG inhalation capsule Inhale 18 mcg into the lungs daily 7/25/16   Historical Provider, MD   budesonide-formoterol (SYMBICORT) 160-4.5 MCG/ACT AERO Inhale 2 puffs into the lungs 2 times daily    Historical Provider, MD   albuterol (PROVENTIL HFA) 108 (90 BASE) MCG/ACT inhaler Inhale 2 puffs into the lungs every 6 hours as needed for Wheezing or Shortness of Breath.  12/20/12   Sulema Gamboa MD        CURRENT Medications:  sodium chloride flush 0.9 % injection 10 mL, 2 times per day  sodium chloride flush 0.9 % injection 10 mL, PRN  0.9 % sodium chloride infusion, PRN  promethazine (PHENERGAN) tablet 12.5 mg, Q6H PRN   Or  ondansetron (ZOFRAN) injection 4 mg, Q6H PRN  cefepime (MAXIPIME) 2000 mg IVPB minibag, Q12H  heparin (porcine) injection 3,130 Units, PRN  heparin (porcine) injection 1,570 Units, PRN  heparin 25,000 units in dextrose 5% 250 mL (premix) infusion, Continuous  glucose (GLUTOSE) 40 % oral gel 15 g, PRN  dextrose 50 % IV solution, PRN  glucagon (rDNA) injection 1 mg, PRN  dextrose 5 % solution, PRN  insulin lispro (HUMALOG) injection vial 0-12 Units, Q4H  vancomycin (VANCOCIN) intermittent dosing (placeholder), RX Placeholder  albuterol sulfate  (90 Base) MCG/ACT inhaler 2 puff, Q4H WA  ipratropium (ATROVENT HFA) 17 MCG/ACT inhaler 2 puff, Q4H WA        Allergies:  Latex and Codeine     Review of Systems:   A 14 point review of symptoms completed. Pertinent positives identified in the HPI, all other review of symptoms negative as below.       Objective   PHYSICAL EXAM:    Vitals:    08/31/21 0929   BP: 125/68   Pulse: 98   Resp: 18   Temp: 97.9 °F (36.6 °C)   SpO2: 92%    Weight: 118 lb 6.2 oz (53.7 kg)         General Appearance:  Alert, cooperative, no distress, appears stated age, lying flat in bed, able to speak in full sentences   Head:  Normocephalic, without obvious abnormality, atraumatic   Eyes:  conjunctiva/corneas clear   Nose: Nares normal, no drainage or sinus tenderness   Throat: Lips, mucosa, and tongue dry   Neck: Supple, no jvp   Lungs:    Bilateral wheezing, respirations unlabored   Chest Wall:  No deformity or tenderness   Heart:  Regular rate and rhythm, S1, S2 normal, distant heart sounds   Abdomen:   Soft, non-tender,   Extremities: 1+ rt>lt le edema    Pulses: decrs in radial arteries    Skin: Skin color, texture, turgor normal, no rashes or lesions   Pysch: Normal mood and affect   Neurologic: Normal gross motor and sensory exam.         Labs   CBC:   Lab Results   Component Value Date    WBC 8.8 08/31/2021    RBC 2.22 08/31/2021    HGB 7.2 08/31/2021    HCT 21.7 08/31/2021    MCV 97.7 08/31/2021    RDW 18.5 08/31/2021     08/31/2021     CMP:  Lab Results   Component Value Date     08/31/2021    K 3.7 08/31/2021     08/31/2021    CO2 28 08/31/2021    BUN 24 08/31/2021    CREATININE 1.2 08/31/2021    GFRAA 52 08/31/2021    GFRAA >60 05/08/2013    AGRATIO 1.1 08/31/2021    LABGLOM 43 08/31/2021    GLUCOSE 123 08/31/2021    PROT 5.3 08/31/2021    PROT 7.81 12/20/2012    CALCIUM 8.5 08/31/2021    BILITOT 0.3 08/31/2021    ALKPHOS 74 08/31/2021    AST 13 08/31/2021    ALT 10 08/31/2021     PT/INR:  No results found for: PTINR  HgBA1c:  Lab Results   Component Value Date    LABA1C 5.2 2021     Lab Results   Component Value Date    CKTOTAL 96 2015    TROPONINI 0.06 (H) 2021         Cardiac Data     Last EKG:     Nsr, Lt axis, irbbb, nonspec st changes, slightly more pronounced st elev in v1 and v2 as compared to prior ekg from 2021        Echo:    Stress Test:    2020     Summary    No inducible ischemia or scar. Normal left ventricular wall motion and    myocardial thickening with LVEF of 75%. Overall findings represent a low    risk scan. Cath:    2019       CARDIAC CATHETERIZATION     PATIENT NAME: Seda Jensen                 :        1940  MED REC NO:   [id number]                          ROOM:       [id number]  ACCOUNT NO:   [de-identified]                           ADMIT DATE: 2019  PROVIDER:     Scott Lux MD     DATE OF PROCEDURE:  2019     PROCEDURES PERFORMED:  Left heart catheterization, coronary  cineangiography, Angio-Seal of the right femoral arteriotomy site.     HISTORY:  The patient is a 78-year-old white female with a history of  COPD, on chronic oxygen, hypertension, hyperlipidemia, who presented  with complaints of chest discomfort. She did have elevation in her  troponin. She had T-wave changes, which developed across her anterior  precordium as well as inferiorly. She did have recurrent chest  discomfort last night, through the night. Because of her non-ST  elevation myocardial infarction and post-infarction angina, it was felt  that she should undergo catheterization.     TECHNICAL PROCEDURE:  The patient was brought to cardiac catheterization  lab on 2019 where her right femoral area was prepped and draped in  usual sterile fashion. After anesthetizing area with 2% lidocaine, a  5-Korean sheath was placed in the right femoral artery using Seldinger  technique.   Subsequently, left heart catheterization, left  ventriculography, and selective coronary angiography of both left and  right coronaries were performed in multiple projections. This was  performed using 5-Icelandic pigtail, JL-4 and JR-4 diagnostic catheters. The patient tolerated the procedure well. No complications were  encountered. Brief right femoral arteriogram was obtained to ascertain  positioning appropriate for Angio-Seal.  It was well positioned. She  was successfully sealed with standard 6-Icelandic Angio-Seal device. No  complications encountered.     RESULTS:  HEMODYNAMICS:  Left ventricular end-diastolic pressure equals 25.     There was no significant gradient across the aortic valve by pullback  post cineangiography.     LEFT VENTRICULOGRAM:  Left ventriculogram demonstrates severe hypo to  akinesis of the inferior wall. Estimated ejection fraction of 35 to  40%.     LEFT MAIN:  Left main does have ostial disease of approximately 70%. There is catheter dampening upon engagement of the left main.     LEFT ANTERIOR DESCENDING:  The LAD course to and wraps around the apex. Gives off a large diagonal branch with multiple terminal divisions. Just at the diagonal branch, it was free of significant obstructive  disease. The LAD does continue, and just after the large diagonal  branch, has 90% stenosis in the LAD proper.     LEFT CIRCUMFLEX:  Circumflex is small nondominant vessel. It consists  essentially of a single marginal branch. It is free of significant  obstructive disease.     RIGHT CORONARY ARTERY:  Right coronary artery is very large dominant  vessel. It gives off a large PDA and two large posterolateral branches. Right coronary artery is free of significant obstructive disease.     IMPRESSION:  1.  LV dysfunction with estimated ejection fraction of 35 to 40% with  inferior hypo to akinesis. 2.  Coronary artery disease as described above including ostial left  main disease (with catheter dampening upon engagement) and mid LAD  disease. 3.  Recommend surgical consultation.   4.  Successful Angio-Seal of right femoral arteriotomy site. 5.  Estimated blood loss less than 5-10cc.           Robert Sheppard MD       Studies:     cxr       Impression   No radiographic evidence of acute pulmonary disease.             I have reviewed labs and imaging/xray/diagnostic testing in this note.     Assessment and Plan          Patient Active Problem List   Diagnosis    Hyperlipidemia    Asthma    OA (osteoarthritis)    Tobacco use    COPD exacerbation ([facility] [address] [facility]    Sepsis ([facility] [address] [facility]    Abnormal chest x-ray    COPD with acute exacerbation (HCC)    Shortness of breath    Tobacco abuse    PNA (pneumonia)    Moderate malnutrition ([facility] [facility]    NSTEMI (non-ST elevated myocardial infarction) ([facility] [address] [facility]    Acute respiratory failure with hypoxia (HCC)    Acute pulmonary edema (HCC)    Pulmonary infiltrate    Elevated brain natriuretic peptide (BNP) level    Leukocytosis    Ischemic cardiomyopathy    Acute combined systolic and diastolic congestive heart failure (HCC)    Hypernatremia    NADJA (acute kidney injury) ([facility] [address] [facility]    Status post aorto-coronary artery bypass graft    Mucus plugging of bronchi    CAD in native artery    S/P CABG (coronary artery bypass graft)    Pneumonia of both lower lobes due to methicillin resistant Staphylococcus aureus (MRSA) (HCC)    GI bleed    Severe malnutrition (HCC)    Chest pain    Chronic respiratory failure with hypoxia (HCC)    Essential hypertension    Anemia    Acute respiratory failure (HCC)       Shortness of breath, likely related to COPD exacerbation, management/plans as per primary service    Elevated troponin, likely supply/demand mismatch, will DC heparin drip, obtain echocardiogram and Lexiscan nuclear stress test for further risk ratification, would consider cardiac catheterization if there are very high risk features, otherwise medical management is likely best.    CAD/ASHD, status post CABG, resume antiplatelet agents at discharge if hemoglobin remains

## 2021-08-31 NOTE — CARE COORDINATION
Patient on BiPAP. From [facility] and can return when stable. Covid test negative. She was at home prior to going to [facility]. Will follow and assist with discharge back to skilled rehab when medically ready and respiratory status improved.

## 2021-08-31 NOTE — PROGRESS NOTES
Sister called RN into room very concerned. Patient was on a nectar thick, pureed diet at [facility]. Messaged MD- Can I order ST eval and new diet please.

## 2021-08-31 NOTE — PROGRESS NOTES
Hospitalist Progress Note      PCP: Lane Ruth MD    Date of Admission: 8/29/2021    Chief Complaint: SOB    Subjective: no new c/o. On BiPap      Medications:  Reviewed    Infusion Medications    sodium chloride      heparin (PORCINE) Infusion 730 Units/hr (08/30/21 1158)    dextrose       Scheduled Medications    sodium chloride flush  10 mL IntraVENous 2 times per day    cefepime  2,000 mg IntraVENous Q12H    insulin lispro  0-12 Units SubCUTAneous Q4H    vancomycin (VANCOCIN) intermittent dosing (placeholder)   Other RX Placeholder    albuterol sulfate HFA  2 puff Inhalation Q4H WA    ipratropium  2 puff Inhalation Q4H WA     PRN Meds: sodium chloride flush, sodium chloride, promethazine **OR** ondansetron, heparin (porcine), heparin (porcine), glucose, dextrose, glucagon (rDNA), dextrose      Intake/Output Summary (Last 24 hours) at 8/31/2021 0858  Last data filed at 8/31/2021 0435  Gross per 24 hour   Intake 720 ml   Output 1400 ml   Net -680 ml       Physical Exam Performed:    /74   Pulse 89   Temp 98 °F (36.7 °C) (Oral)   Resp 20   Ht 5' 2\" (1.575 m)   Wt 115 lb (52.2 kg)   SpO2 100%   BMI 21.03 kg/m²     General appearance: No apparent distress, appears stated age and cooperative. HEENT: Pupils equal, round, and reactive to light. Conjunctivae/corneas clear. Neck: Supple, with full range of motion. No jugular venous distention. Trachea midline. Respiratory:  Normal respiratory effort. Clear to auscultation, bilaterally without Rales/Wheezes/Rhonchi. Cardiovascular: Regular rate and rhythm with normal S1/S2 without murmurs, rubs or gallops. Abdomen: Soft, non-tender, non-distended with normal bowel sounds. Musculoskeletal: No clubbing, cyanosis or edema bilaterally. Full range of motion without deformity. Skin: Skin color, texture, turgor normal.  No rashes or lesions. Neurologic:  Neurovascularly intact without any focal sensory/motor deficits.  Cranial nerves: II-XII intact, grossly non-focal.  Psychiatric: Alert and oriented, thought content appropriate, normal insight  Capillary Refill: Brisk,< 3 seconds   Peripheral Pulses: +2 palpable, equal bilaterally       Labs:   Recent Labs     08/29/21 2205 08/31/21 0441   WBC 13.2* 8.8   HGB 9.9* 7.2*   HCT 31.4* 21.7*    243     Recent Labs     08/29/21 2205 08/31/21 0441    140   K 4.9 3.7    106   CO2 30 28   BUN 15 24*   CREATININE 1.0 1.2   CALCIUM 9.2 8.5     Recent Labs     08/29/21 2205 08/31/21 0441   AST 20 13*   ALT 16 10   BILITOT 0.4 0.3   ALKPHOS 123 74     No results for input(s): INR in the last 72 hours. Recent Labs     08/30/21  0445 08/30/21  1001 08/30/21  1834   TROPONINI 0.07* 0.07* 0.06*       Urinalysis:      Lab Results   Component Value Date    NITRU Negative 08/29/2021    WBCUA 3-5 08/29/2021    BACTERIA Rare 08/29/2021    RBCUA 3-4 08/29/2021    BLOODU Negative 08/29/2021    SPECGRAV 1.020 08/29/2021    GLUCOSEU Negative 08/29/2021       Consults:    PHARMACY TO DOSE VANCOMYCIN  PHARMACY TO DOSE VANCOMYCIN  IP CONSULT TO CARDIOLOGY  IP CONSULT TO DIETITIAN      Assessment/Plan:    Active Hospital Problems    Diagnosis     [facility] [J96.00]     Essential hypertension [I10]     S/P CABG (coronary artery bypass graft) [Z95.1]     CAD in native artery [I25.10]     PNA (pneumonia) [J18.9]     COPD with acute exacerbation ([facility] [J44.1]     Tobacco use [Z72.0]          PNA - likely CAP w/ Strep Pneumonia. Started on empiric Vanco/Cefepime on 30 August - continued. Sepsis - w/ Leukocytosis/Tachycardia POArrival 2nd to above infection. Continue IVF as appropriate and monitor clinical response w/ ABX as written. COPD - w/out chronic respiratory failure on no baseline home O2. Normally controlled on home medication regimen - continued. Here w/ acute exacerbation.   Continue Steroids/HHN    Acute Respiratory Failure - w/ hypoxia, POArrival - multifactorial 2nd to above. Presence of clinical respiratory distress w/ tachypnea/dyspnea/SOB and wheezing w/ use of accessory muscles to breath - initially requiring BiPap. Supplemental O2 and wean as tolerated. HTN/CAD - w/ known CAD but no evidence of active signs/sxs of ischemia/failure. Currently controlled on home meds w/ vitals reviewed and documented as above. DM2 - diet controlled on no home oral antiGlycemics/Insulin. Follow FSBS/SSI medium regimen. Last HbA1c 5.2% dated this admission. Anticipate resuming/continuing home regimen at discharge. GERD - w/out active signs/sxs of dysphagia/odynophagia. No evidence of active PUD or hx of GI bleed. Controlled on home PPI - continue. Anxiety - controlled on home meds - continued.      NSTEMI:  Etiology suspect from sepsis? Echo pending  Aspirin given  Low dose heparin  Cardiology consulted       DVT Prophylaxis: Heparin gtt    Recent Labs     08/29/21  2205 08/31/21  0441    243     Diet: ADULT DIET; Regular  Code Status: Full Code      PT/OT Eval Status: Not yet ordered. Came from SNF    Dispo - here for the foreseeable future pending clinical course and subspecialty recs.         Ephraim Solis MD

## 2021-08-31 NOTE — PROGRESS NOTES
Comprehensive Nutrition Assessment    Type and Reason for Visit:  Initial, Consult, Patient Education (malnutrition)    Nutrition Recommendations/Plan:   1. Regular diet, no salt packet-patient doesn't like salt and prefers Mrs Marimar Cain   2. Ensure with meals   3. Will monitor nutritional adequacy, nutrition-related labs, weights, BMs, and clinical progress   4. Education provided on low sodium nutrition and high protein foods and nutrition supplements encouraged    Nutrition Assessment:  Patient admitted with pneumonia. HX of COPD. Patient reported having a difficult time cooking for herself. Often times patient relys on frozen meals and take out like pizza brought by son. Dietitian consult for heart failure diet education. Patient did report not eating salt but often times relys on frozen meals and take out like pizza brought by son. Will continue to monitor nutrition progression. Malnutrition Assessment:  Malnutrition Status:  Severe malnutrition    Context:  Chronic Illness     Findings of the 6 clinical characteristics of malnutrition:  Energy Intake:  7 - 75% or less estimated energy requirements for 1 month or longer  Weight Loss: Body Fat Loss:  7 - Severe body fat loss Triceps, Orbital   Muscle Mass Loss:  7 - Severe muscle mass loss Clavicles (pectoralis & deltoids), Temples (temporalis), Scapula (trapezius)  Fluid Accumulation:  No significant fluid accumulation     Strength:  Not Performed    Estimated Daily Nutrient Needs:  Energy (kcal):  5746-5107; Weight Used for Energy Requirements:  Current (53.7 kg)     Protein (g):  70-81;  Weight Used for Protein Requirements:  Current (1.3-1.5; 53.7 kg)        Fluid (ml/day):   ; Method Used for Fluid Requirements:  1 ml/kcal      Nutrition Related Findings:  patient asked for assistance with putting sugar and cream in coffee upon arrival, ate all of fruit but very little scrambled eggs and pancakes,      Wounds:   (scattered bruising; skin tears)       Current Nutrition Therapies:    ADULT DIET; Regular  Adult Oral Nutrition Supplement; Standard High Calorie/High Protein Oral Supplement    Anthropometric Measures:  · Height: 5' 2\" (157.5 cm)  · Current Body Weight: 118 lb 6 oz (53.7 kg)   · Ideal Body Weight: 110 lbs; % Ideal Body Weight     · BMI: 21.6  · BMI Categories: Normal Weight (BMI 18.5-24. 9)       Nutrition Diagnosis:   · Increased nutrient needs related to increase demand for energy/nutrients as evidenced by poor intake prior to admission, severe muscle loss, severe loss of subcutaneous fat    Nutrition Interventions:   Food and/or Nutrient Delivery:  Continue Current Diet, Start Oral Nutrition Supplement (patient not using salt packet)  Nutrition Education/Counseling:  Education completed   Coordination of Nutrition Care:  Continue to monitor while inpatient    Goals:  Patient will eat 50% or greater of meals and supplements. Nutrition Monitoring and Evaluation:   Behavioral-Environmental Outcomes:      Food/Nutrient Intake Outcomes:  Food and Nutrient Intake, Supplement Intake  Physical Signs/Symptoms Outcomes:  Biochemical Data, Nutrition Focused Physical Findings     Discharge Planning:     Too soon to determine     Electronically signed by Barbara Vaughn, [address] [place] [address],  on 8/31/21 at 10:56 AM EDT    Contact: Office: [phone]; [address]: [phone]

## 2021-08-31 NOTE — PROGRESS NOTES
08/31/21 1226   NIV Type   Skin Protection for O2 Device Yes   Location Nose   NIV Started/Stopped On   Equipment Type v60   Mode Bilevel   Mask Type Full face mask   Mask Size Small   Settings/Measurements   IPAP 20 cmH20   CPAP/EPAP 8 cmH2O   Rate Ordered 4   Resp 22   FiO2  40 %   Vt Exhaled 703 mL   Minute Volume 17.5 Liters   Mask Leak (lpm) 25 lpm   Comfort Level Good   Using Accessory Muscles No   SpO2 99   Breath Sounds   Right Upper Lobe Diminished   Right Middle Lobe Diminished   Right Lower Lobe Diminished   Left Upper Lobe Diminished   Left Lower Lobe Diminished   Patient Observation   Observations mepilex on nose   Alarm Settings   Alarms On Y   Press Low Alarm 6 cmH2O   High Pressure Alarm 30 cmH2O   Delay Alarm 20 sec(s)   Apnea (secs) 20 secs   Resp Rate Low Alarm 6   High Respiratory Rate 45 br/min

## 2021-08-31 NOTE — FLOWSHEET NOTE
08/30/21 2004   Assessment   Charting Type Shift assessment   Neurological   Neuro (WDL) WDL   Level of Consciousness Alert (0)   Jennifer Coma Scale   Eye Opening 4   Best Verbal Response 5   Best Motor Response 6   Jennifer Coma Scale Score 15   HEENT   HEENT (WDL) WDL   Respiratory   Respiratory (WDL) WDL   Respiratory Pattern Regular   Respiratory Depth Normal   Respiratory Quality/Effort Unlabored   Chest Assessment Chest expansion symmetrical   L Breath Sounds Diminished   R Breath Sounds Diminished   Breath Sounds   Right Upper Lobe Diminished   Right Middle Lobe Diminished   Right Lower Lobe Diminished   Left Upper Lobe Diminished   Left Lower Lobe Diminished   Cardiac   Cardiac (WDL) WDL   Cardiac Monitor   Telemetry Monitor On Yes   Telemetry Audible Yes   Telemetry Alarms Set Yes   Gastrointestinal   Abdominal (WDL) WDL   Peripheral Vascular   Peripheral Vascular (WDL) WDL   Edema None   Skin Color/Condition   Skin Color/Condition (WDL) WDL   Skin Integrity   Skin Integrity (WDL) X   Skin Integrity Bruising   Location scattered   Skin Integrity Site 2   Skin Integrity Location 2 Tear   Location 2 scattered    Skin Integrity Site 3   Skin Integrity Location 3 Tear    Location 3 rt arm   Musculoskeletal   Musculoskeletal (WDL) X   RUE Full movement   LUE Full movement   RL Extremity Weakness   LL Extremity Weakness   Genitourinary   Genitourinary (WDL) WDL  (f/c)   Flank Tenderness No   Suprapubic Tenderness No   Dysuria No   Anus/Rectum   Anus/Rectum (WDL) WDL   Urethral Catheter 16 fr   Placement Date/Time: 08/29/21 2230   Urethral Catheter Timeout: Patient;Procedure;Site/Side;Appropriate Equipment  Tube Size (fr): 16 fr  Catheter Balloon Size: 10 mL  Collection Container: Standard  Securement Method: Securing device (Describe)  Urin. ..    Catheter Indications Need for fluid volume management of the critically ill patient in a critical care setting   Site Assessment Pink   Urine Color Yellow   Urine Appearance Clear

## 2021-08-31 NOTE — PROGRESS NOTES
Physician Progress Note      PATIENT:               Eloisa Sullivan  CSN #:                  [id number]  :                       1940  ADMIT DATE:       2021 10:00 PM  [address] [place] DATE:  RESPONDING  PROVIDER #:        Jamarcus Miranda MD          QUERY TEXT:    Pt admitted with Sepsis/PNA. Noted documentation of \"Severe malnutrition\" by   RD consultant on PN . If possible, please document in progress notes and   discharge summary:    The medical record reflects the following:  Risk Factors: COPD on chronic O2 at baseline, asthma    Clinical Indicators: \"Severe malnutrition. ..-Increased nutrient needs related   to increase demand for energy/nutrients as evidenced by poor intake prior to   admission, severe muscle loss, severe loss of subcutaneous fat\"  BMI 21 / Weight 118 lb per bed scale weight    Treatment: RD consult, CHF diet education, oral high protein oral supplement   with Regular diet, ongoing supportive care and monitoring    thank you,  Magdalene Cox RN,BSN  [email or web address]  Options provided:  -- Severe malnutrition confirmed  -- Malnutrition confirmed, please specify if mild or moderate, please specify   if mild or moderate. -- Underweight, BMI 21  -- Other - I will add my own diagnosis  -- Disagree - Not applicable / Not valid  -- Disagree - Clinically unable to determine / Unknown  -- Refer to Clinical Documentation Reviewer    PROVIDER RESPONSE TEXT:    This patient is underweight, BMI 21.     Query created by: Giuseppe Desouza on 2021 1:03 PM      Electronically signed by:  Jamarcus Miranda MD 2021 2:06 PM

## 2021-08-31 NOTE — PROGRESS NOTES
08/30/21 2346   NIV Type   NIV Started/Stopped On   Equipment Type v60   Mode Bilevel   Mask Type Full face mask   Mask Size Small   Settings/Measurements   IPAP 20 cmH20   CPAP/EPAP 8 cmH2O   Rate Ordered 4   Resp 23   FiO2  50 %   Vt Exhaled 529 mL   Mask Leak (lpm) 0 lpm   Comfort Level Good   Using Accessory Muscles No   Patient Observation   Observations mepilex on nose   Alarm Settings   Alarms On Y   Press Low Alarm 6 cmH2O   High Pressure Alarm 30 cmH2O   Resp Rate Low Alarm 6   High Respiratory Rate 50 br/min

## 2021-09-01 ENCOUNTER — APPOINTMENT (OUTPATIENT)
Dept: NUCLEAR MEDICINE | Age: 81
DRG: 871 | End: 2021-09-01
Payer: MEDICARE

## 2021-09-01 LAB
A/G RATIO: 1.3 (ref 1.1–2.2)
ALBUMIN SERPL-MCNC: 3.1 G/DL (ref 3.4–5)
ALP BLD-CCNC: 84 U/L (ref 40–129)
ALT SERPL-CCNC: 9 U/L (ref 10–40)
ANION GAP SERPL CALCULATED.3IONS-SCNC: 6 MMOL/L (ref 3–16)
AST SERPL-CCNC: 12 U/L (ref 15–37)
BASOPHILS ABSOLUTE: 0 K/UL (ref 0–0.2)
BASOPHILS RELATIVE PERCENT: 0.3 %
BILIRUB SERPL-MCNC: 0.5 MG/DL (ref 0–1)
BUN BLDV-MCNC: 19 MG/DL (ref 7–20)
CALCIUM SERPL-MCNC: 8.5 MG/DL (ref 8.3–10.6)
CHLORIDE BLD-SCNC: 103 MMOL/L (ref 99–110)
CO2: 31 MMOL/L (ref 21–32)
CREAT SERPL-MCNC: 1 MG/DL (ref 0.6–1.2)
EOSINOPHILS ABSOLUTE: 0.2 K/UL (ref 0–0.6)
EOSINOPHILS RELATIVE PERCENT: 2.6 %
GFR AFRICAN AMERICAN: >60
GFR NON-AFRICAN AMERICAN: 53
GLOBULIN: 2.3 G/DL
GLUCOSE BLD-MCNC: 110 MG/DL (ref 70–99)
GLUCOSE BLD-MCNC: 114 MG/DL (ref 70–99)
GLUCOSE BLD-MCNC: 119 MG/DL (ref 70–99)
GLUCOSE BLD-MCNC: 123 MG/DL (ref 70–99)
GLUCOSE BLD-MCNC: 128 MG/DL (ref 70–99)
HCT VFR BLD CALC: 23.5 % (ref 36–48)
HEMOGLOBIN: 7.6 G/DL (ref 12–16)
LV EF: 55 %
LVEF MODALITY: NORMAL
LYMPHOCYTES ABSOLUTE: 0.8 K/UL (ref 1–5.1)
LYMPHOCYTES RELATIVE PERCENT: 13.3 %
MAGNESIUM: 1.7 MG/DL (ref 1.8–2.4)
MCH RBC QN AUTO: 29.6 PG (ref 26–34)
MCHC RBC AUTO-ENTMCNC: 32.4 G/DL (ref 31–36)
MCV RBC AUTO: 91.1 FL (ref 80–100)
MONOCYTES ABSOLUTE: 0.3 K/UL (ref 0–1.3)
MONOCYTES RELATIVE PERCENT: 5.4 %
NEUTROPHILS ABSOLUTE: 4.7 K/UL (ref 1.7–7.7)
NEUTROPHILS RELATIVE PERCENT: 78.4 %
PDW BLD-RTO: 18.3 % (ref 12.4–15.4)
PERFORMED ON: ABNORMAL
PLATELET # BLD: 239 K/UL (ref 135–450)
PMV BLD AUTO: 7.8 FL (ref 5–10.5)
POTASSIUM REFLEX MAGNESIUM: 3.1 MMOL/L (ref 3.5–5.1)
RBC # BLD: 2.58 M/UL (ref 4–5.2)
SODIUM BLD-SCNC: 140 MMOL/L (ref 136–145)
TOTAL PROTEIN: 5.4 G/DL (ref 6.4–8.2)
VANCOMYCIN TROUGH: 12 UG/ML (ref 10–20)
WBC # BLD: 6 K/UL (ref 4–11)

## 2021-09-01 PROCEDURE — 80053 COMPREHEN METABOLIC PANEL: CPT

## 2021-09-01 PROCEDURE — 6370000000 HC RX 637 (ALT 250 FOR IP): Performed by: NURSE PRACTITIONER

## 2021-09-01 PROCEDURE — 94660 CPAP INITIATION&MGMT: CPT

## 2021-09-01 PROCEDURE — 6360000002 HC RX W HCPCS: Performed by: INTERNAL MEDICINE

## 2021-09-01 PROCEDURE — 3430000000 HC RX DIAGNOSTIC RADIOPHARMACEUTICAL: Performed by: INTERNAL MEDICINE

## 2021-09-01 PROCEDURE — 2580000003 HC RX 258: Performed by: INTERNAL MEDICINE

## 2021-09-01 PROCEDURE — 83735 ASSAY OF MAGNESIUM: CPT

## 2021-09-01 PROCEDURE — 94761 N-INVAS EAR/PLS OXIMETRY MLT: CPT

## 2021-09-01 PROCEDURE — 94640 AIRWAY INHALATION TREATMENT: CPT

## 2021-09-01 PROCEDURE — A9502 TC99M TETROFOSMIN: HCPCS | Performed by: INTERNAL MEDICINE

## 2021-09-01 PROCEDURE — 36415 COLL VENOUS BLD VENIPUNCTURE: CPT

## 2021-09-01 PROCEDURE — 99233 SBSQ HOSP IP/OBS HIGH 50: CPT | Performed by: NURSE PRACTITIONER

## 2021-09-01 PROCEDURE — 2060000000 HC ICU INTERMEDIATE R&B

## 2021-09-01 PROCEDURE — 78452 HT MUSCLE IMAGE SPECT MULT: CPT

## 2021-09-01 PROCEDURE — 2700000000 HC OXYGEN THERAPY PER DAY

## 2021-09-01 PROCEDURE — 93017 CV STRESS TEST TRACING ONLY: CPT

## 2021-09-01 PROCEDURE — 6370000000 HC RX 637 (ALT 250 FOR IP): Performed by: INTERNAL MEDICINE

## 2021-09-01 PROCEDURE — 85025 COMPLETE CBC W/AUTO DIFF WBC: CPT

## 2021-09-01 RX ORDER — POTASSIUM CHLORIDE 20 MEQ/1
20 TABLET, EXTENDED RELEASE ORAL 2 TIMES DAILY
Status: DISCONTINUED | OUTPATIENT
Start: 2021-09-01 | End: 2021-09-03

## 2021-09-01 RX ORDER — LOSARTAN POTASSIUM 25 MG/1
25 TABLET ORAL DAILY
Status: DISCONTINUED | OUTPATIENT
Start: 2021-09-01 | End: 2021-09-03 | Stop reason: HOSPADM

## 2021-09-01 RX ORDER — AMINOPHYLLINE DIHYDRATE 25 MG/ML
75 INJECTION, SOLUTION INTRAVENOUS ONCE
Status: COMPLETED | OUTPATIENT
Start: 2021-09-01 | End: 2021-09-01

## 2021-09-01 RX ADMIN — VANCOMYCIN HYDROCHLORIDE 500 MG: 500 INJECTION, POWDER, LYOPHILIZED, FOR SOLUTION INTRAVENOUS at 04:30

## 2021-09-01 RX ADMIN — Medication 2 PUFF: at 16:20

## 2021-09-01 RX ADMIN — ACETAMINOPHEN 650 MG: 325 TABLET, FILM COATED ORAL at 12:56

## 2021-09-01 RX ADMIN — Medication 2 PUFF: at 08:18

## 2021-09-01 RX ADMIN — Medication 2 PUFF: at 19:58

## 2021-09-01 RX ADMIN — ACETAMINOPHEN 650 MG: 325 TABLET, FILM COATED ORAL at 21:11

## 2021-09-01 RX ADMIN — TETROFOSMIN 11.5 MILLICURIE: 1.38 INJECTION, POWDER, LYOPHILIZED, FOR SOLUTION INTRAVENOUS at 10:48

## 2021-09-01 RX ADMIN — PROMETHAZINE HYDROCHLORIDE 12.5 MG: 25 TABLET ORAL at 15:42

## 2021-09-01 RX ADMIN — SODIUM CHLORIDE, PRESERVATIVE FREE 10 ML: 5 INJECTION INTRAVENOUS at 08:15

## 2021-09-01 RX ADMIN — REGADENOSON 0.4 MG: 0.08 INJECTION, SOLUTION INTRAVENOUS at 12:15

## 2021-09-01 RX ADMIN — CEFEPIME 2000 MG: 2 INJECTION, POWDER, FOR SOLUTION INTRAMUSCULAR; INTRAVENOUS at 01:26

## 2021-09-01 RX ADMIN — POTASSIUM CHLORIDE 20 MEQ: 20 TABLET, EXTENDED RELEASE ORAL at 21:11

## 2021-09-01 RX ADMIN — METOPROLOL TARTRATE 12.5 MG: 25 TABLET, FILM COATED ORAL at 14:01

## 2021-09-01 RX ADMIN — METOPROLOL TARTRATE 12.5 MG: 25 TABLET, FILM COATED ORAL at 21:11

## 2021-09-01 RX ADMIN — LOSARTAN POTASSIUM 25 MG: 25 TABLET, FILM COATED ORAL at 14:01

## 2021-09-01 RX ADMIN — TETROFOSMIN 31.2 MILLICURIE: 1.38 INJECTION, POWDER, LYOPHILIZED, FOR SOLUTION INTRAVENOUS at 12:15

## 2021-09-01 RX ADMIN — AMINOPHYLLINE 75 MG: 25 INJECTION, SOLUTION INTRAVENOUS at 12:31

## 2021-09-01 RX ADMIN — SODIUM CHLORIDE 25 ML: 9 INJECTION, SOLUTION INTRAVENOUS at 23:58

## 2021-09-01 RX ADMIN — CEFEPIME 2000 MG: 2 INJECTION, POWDER, FOR SOLUTION INTRAMUSCULAR; INTRAVENOUS at 14:04

## 2021-09-01 RX ADMIN — POTASSIUM CHLORIDE 20 MEQ: 20 TABLET, EXTENDED RELEASE ORAL at 14:01

## 2021-09-01 RX ADMIN — SODIUM CHLORIDE, PRESERVATIVE FREE 10 ML: 5 INJECTION INTRAVENOUS at 21:15

## 2021-09-01 RX ADMIN — BUSPIRONE HYDROCHLORIDE 5 MG: 5 TABLET ORAL at 10:00

## 2021-09-01 RX ADMIN — Medication 2 PUFF: at 19:53

## 2021-09-01 RX ADMIN — CEFEPIME 2000 MG: 2 INJECTION, POWDER, FOR SOLUTION INTRAMUSCULAR; INTRAVENOUS at 23:58

## 2021-09-01 ASSESSMENT — PAIN SCALES - GENERAL
PAINLEVEL_OUTOF10: 0
PAINLEVEL_OUTOF10: 5
PAINLEVEL_OUTOF10: 6
PAINLEVEL_OUTOF10: 6

## 2021-09-01 NOTE — PROGRESS NOTES
[facility] 82   Daily Progress Note    Admit Date:  8/29/2021  HPI:    Chief Complaint   Patient presents with    Shortness of Breath     nursing home reports sudden obnset of SOB tonight. full code per report from EMS. 70% on 5L when EMS arrived. placed on CPAP and went down to 60%. placed on NRB and given duoneb and albuterol and back up to mid 80s. Interval history: [facility] is being followed for shortness of breath, elevated troponin. She was treated with a heparin drip. History of CAD s/p CABG 9/2019, follows with Dr. Ruben Ramirez, HTN, HLD, severe COPD with chronic hypoxemia. Recently admitted to [facility] from 8/12/2021 through 8/26/2021 with cardiac arrest, hypothermia protocol. Discharged on amiodarone. And to a SNF. Subjective:  Ms. Lisa Castillo seen in the stress lab. Denies any chest pain.  Some chest soreness     Objective:   BP (!) 162/73   Pulse 100   Temp 98.3 °F (36.8 °C) (Oral)   Resp 20   Ht 5' 2\" (1.575 m)   Wt 118 lb 6.2 oz (53.7 kg)   SpO2 98%   BMI 21.65 kg/m²       Intake/Output Summary (Last 24 hours) at 9/1/2021 0933  Last data filed at 9/1/2021 9360  Gross per 24 hour   Intake 120 ml   Output 2400 ml   Net -2280 ml       NYHA: IV    Physical Exam:  General:  Awake, alert, NAD, chronically ill-appearing  Skin:  Warm and dry  Neck:  JVD<8  Chest:  Dim to auscultation, no wheezes/rhonchi/rales  Telemetry: 's  Cardiovascular:  RRR S1S2, no m/r/g   Abdomen:  Soft, nontender, +bowel sounds  Extremities:  trace bilateral lower extremity edema    Medications:    sodium chloride flush  10 mL IntraVENous 2 times per day    cefepime  2,000 mg IntraVENous Q12H    insulin lispro  0-12 Units SubCUTAneous Q4H    vancomycin (VANCOCIN) intermittent dosing (placeholder)   Other RX Placeholder    albuterol sulfate HFA  2 puff Inhalation Q4H WA    ipratropium  2 puff Inhalation Q4H WA      sodium chloride      dextrose         Lab Data:  CBC:   Recent Labs 08/29/21 2205 08/31/21 0441 09/01/21 0451   WBC 13.2* 8.8 6.0   HGB 9.9* 7.2* 7.6*    243 239     BMP:    Recent Labs     08/29/21 2205 08/31/21 0441 09/01/21 0452    140 140   K 4.9 3.7 3.1*   CO2 30 28 31   BUN 15 24* 19   CREATININE 1.0 1.2 1.0     INR:  No results for input(s): INR in the last 72 hours. BNP:    Recent Labs     08/29/21 2205   PROBNP 6,133*     Lab Results   Component Value Date    LVEF 75 09/19/2020    LVEFMODE Cardiac Cath 09/05/2019       Testing:  Cardiac cath 9/5/19  RESULTS:  HEMODYNAMICS:  Left ventricular end-diastolic pressure equals 25. There was no significant gradient across the aortic valve by pullback  post cineangiography. LEFT VENTRICULOGRAM:  Left ventriculogram demonstrates severe hypo to  akinesis of the inferior wall.  Estimated ejection fraction of 35 to  40%. LEFT MAIN:  Left main does have ostial disease of approximately 70%. There is catheter dampening upon engagement of the left main. LEFT ANTERIOR DESCENDING:  The LAD course to and wraps around the apex. Gives off a large diagonal branch with multiple terminal divisions. Just at the diagonal branch, it was free of significant obstructive  disease.  The LAD does continue, and just after the large diagonal  branch, has 90% stenosis in the LAD proper. LEFT CIRCUMFLEX:  Circumflex is small nondominant vessel.  It consists  essentially of a single marginal branch.  It is free of significant  obstructive disease.     RIGHT CORONARY ARTERY:  Right coronary artery is very large dominant  vessel.  It gives off a large PDA and two large posterolateral branches. Right coronary artery is free of significant obstructive disease.     IMPRESSION:  1.  LV dysfunction with estimated ejection fraction of 35 to 40% with  inferior hypo to akinesis. 2.  Coronary artery disease as described above including ostial left  main disease (with catheter dampening upon engagement) and mid LAD  disease.   3.  Recommend surgical consultation. 4.  Successful Angio-Seal of right femoral arteriotomy site. 5.  Estimated blood loss less than 5-10cc.     Stress Test:  9/2020   Summary    No inducible ischemia or scar. Normal left ventricular wall motion and    myocardial thickening with LVEF of 75%. Overall findings represent a low    risk scan. Echocardiogram 8/13/2021  Summary:   The left ventricular function is normal.   Overall left ventricular ejection fraction is estimated to be 55-60%. The left ventricular wall motion is normal.   There is no pericardial effusion. Echo 8/31/21   Summary   -- Normal left ventricular systolic function with ejection fraction of   55-60%. No regional wall motion abnormalites are seen. Left ventricular   diastolic filling pressure is elevated. Compared to previous study from 9-4-2019 no changes noted in left   ventricular function. Mild mitral regurgitation. Principal Problem:    PNA (pneumonia)  Active Problems:    Tobacco use    COPD with acute exacerbation (HCC)    CAD in native artery    S/P CABG (coronary artery bypass graft)    Severe malnutrition (HCC)    Essential hypertension    Acute respiratory failure ([facility] [address]  Resolved Problems:    * No resolved hospital problems. *      Assessment:  Shortness of breath  Acute on chronic chronic hypoxemic respiratory failure  COPD exacerbation, end-stage COPD  Pneumonia, sepsis  Elevated troponin  Recent cardiopulmonary arrest 8/2021, required CPR, sternal fracture,   CAD s/p CABG 2019  CVA 8/2021  Anemia  Diabetes  HLD  HTN  Hypokalemia      Plan:  Stress test pending  Monitor H&H  Continue aspirin statin  Restart metoprolol 12.5mg BID  Discussed with nursing- home meds need to be reconciled (was just discharged from [facility] to [facility])   Restart losartan 25mg daily   Replace potassium     She will need rehab after discharge given debility.      CLIFTON Ni - CNP, CNP, 9/1/2021, 12:23 PM

## 2021-09-01 NOTE — FLOWSHEET NOTE
08/31/21 2230   Assessment   Charting Type Shift assessment   Neurological   Neuro (WDL) WDL   Level of Consciousness Alert (0)   Jennifer Coma Scale   Eye Opening 4   Best Verbal Response 5   Best Motor Response 6   Jennifer Coma Scale Score 15   HEENT   HEENT (WDL) WDL   Respiratory   Respiratory (WDL) WDL   Respiratory Pattern Regular   Respiratory Depth Normal   Respiratory Quality/Effort Unlabored   Chest Assessment Chest expansion symmetrical   L Breath Sounds Diminished   R Breath Sounds Diminished   Breath Sounds   Right Upper Lobe Diminished   Right Middle Lobe Diminished   Right Lower Lobe Diminished   Left Upper Lobe Diminished   Left Lower Lobe Diminished   Cardiac   Cardiac (WDL) WDL   Cardiac Monitor   Telemetry Monitor On Yes   Telemetry Audible Yes   Telemetry Alarms Set Yes   Gastrointestinal   Abdominal (WDL) WDL   Peripheral Vascular   Peripheral Vascular (WDL) WDL   Edema None   Skin Color/Condition   Skin Color/Condition (WDL) WDL   Skin Integrity   Skin Integrity (WDL) X   Skin Integrity Bruising   Location scattered   Skin Integrity Site 2   Skin Integrity Location 2 Tear   Location 2 scattered   Skin Integrity Site 3   Skin Integrity Location 3 Tear    Location 3 rt arm   Musculoskeletal   Musculoskeletal (WDL) X   RUE Full movement   LUE Full movement   RL Extremity Weakness   LL Extremity Weakness   Genitourinary   Genitourinary (WDL) WDL  (f/c)   Flank Tenderness No   Suprapubic Tenderness No   Dysuria No   Anus/Rectum   Anus/Rectum (WDL) WDL   Urethral Catheter 16 fr   Placement Date/Time: 08/29/21 2230   Urethral Catheter Timeout: Patient;Procedure;Site/Side;Appropriate Equipment  Tube Size (fr): 16 fr  Catheter Balloon Size: 10 mL  Collection Container: Standard  Securement Method: Securing device (Describe)  Urin. ..    Catheter Indications Need for fluid volume management of the critically ill patient in a critical care setting   Site Assessment Pink   Urine Color Yellow   Urine Appearance Clear

## 2021-09-01 NOTE — PROGRESS NOTES
09/01/21 0010   NIV Type   $NIV $Daily Charge   Skin Assessment Clean, dry, & intact   Skin Protection for O2 Device Yes   Location Nose   NIV Started/Stopped On   Equipment Type v60   Mode Bilevel   Mask Type Full face mask   Mask Size Small   Settings/Measurements   IPAP 20 cmH20   CPAP/EPAP 8 cmH2O   Rate Ordered 4   Resp 24   FiO2  35 %   Vt Exhaled 497 mL   Minute Volume 11.1 Liters   Mask Leak (lpm) 0 lpm   Comfort Level Good   Using Accessory Muscles No   SpO2 96   Alarm Settings   Alarms On Y   Press Low Alarm 6 cmH2O   High Pressure Alarm 30 cmH2O   Apnea (secs) 20 secs   Resp Rate Low Alarm 6   High Respiratory Rate 40 br/min   Oxygen Therapy/Pulse Ox   SpO2 96 %

## 2021-09-01 NOTE — PROGRESS NOTES
Hospitalist Progress Note      PCP: Samantha Taylor MD    Date of Admission: 8/29/2021    Chief Complaint: SOB    Subjective: no new c/o. Medications:  Reviewed    Infusion Medications    sodium chloride      dextrose       Scheduled Medications    sodium chloride flush  10 mL IntraVENous 2 times per day    cefepime  2,000 mg IntraVENous Q12H    insulin lispro  0-12 Units SubCUTAneous Q4H    vancomycin (VANCOCIN) intermittent dosing (placeholder)   Other RX Placeholder    albuterol sulfate HFA  2 puff Inhalation Q4H WA    ipratropium  2 puff Inhalation Q4H WA     PRN Meds: regadenoson, busPIRone, acetaminophen, sodium chloride flush, sodium chloride, promethazine **OR** ondansetron, glucose, dextrose, glucagon (rDNA), dextrose      Intake/Output Summary (Last 24 hours) at 9/1/2021 0901  Last data filed at 9/1/2021 0817  Gross per 24 hour   Intake 120 ml   Output 2400 ml   Net -2280 ml       Physical Exam Performed:    BP (!) 162/73   Pulse 100   Temp 98.3 °F (36.8 °C) (Oral)   Resp 20   Ht 5' 2\" (1.575 m)   Wt 118 lb 6.2 oz (53.7 kg)   SpO2 98%   BMI 21.65 kg/m²     General appearance: No apparent distress, appears stated age and cooperative. HEENT: Pupils equal, round, and reactive to light. Conjunctivae/corneas clear. Neck: Supple, with full range of motion. No jugular venous distention. Trachea midline. Respiratory:  Normal respiratory effort. Clear to auscultation, bilaterally without Rales/Wheezes/Rhonchi. Cardiovascular: Regular rate and rhythm with normal S1/S2 without murmurs, rubs or gallops. Abdomen: Soft, non-tender, non-distended with normal bowel sounds. Musculoskeletal: No clubbing, cyanosis or edema bilaterally. Full range of motion without deformity. Skin: Skin color, texture, turgor normal.  No rashes or lesions. Neurologic:  Neurovascularly intact without any focal sensory/motor deficits.  Cranial nerves: II-XII intact, grossly non-focal.  Psychiatric: Alert and oriented, thought content appropriate, normal insight  Capillary Refill: Brisk,< 3 seconds   Peripheral Pulses: +2 palpable, equal bilaterally       Labs:   Recent Labs     08/29/21 2205 08/31/21 0441 09/01/21  0451   WBC 13.2* 8.8 6.0   HGB 9.9* 7.2* 7.6*   HCT 31.4* 21.7* 23.5*    243 239     Recent Labs     08/29/21 2205 08/31/21 0441 09/01/21  0452    140 140   K 4.9 3.7 3.1*    106 103   CO2 30 28 31   BUN 15 24* 19   CREATININE 1.0 1.2 1.0   CALCIUM 9.2 8.5 8.5     Recent Labs     08/29/21 2205 08/31/21 0441 09/01/21  0452   AST 20 13* 12*   ALT 16 10 9*   BILITOT 0.4 0.3 0.5   ALKPHOS 123 74 84     No results for input(s): INR in the last 72 hours. Recent Labs     08/30/21 0445 08/30/21  1001 08/30/21  1834   TROPONINI 0.07* 0.07* 0.06*       Urinalysis:      Lab Results   Component Value Date    NITRU Negative 08/29/2021    WBCUA 3-5 08/29/2021    BACTERIA Rare 08/29/2021    RBCUA 3-4 08/29/2021    BLOODU Negative 08/29/2021    SPECGRAV 1.020 08/29/2021    GLUCOSEU Negative 08/29/2021       Consults:    PHARMACY TO DOSE VANCOMYCIN  PHARMACY TO DOSE VANCOMYCIN  IP CONSULT TO CARDIOLOGY  IP CONSULT TO DIETITIAN      Assessment/Plan:    Active Hospital Problems    Diagnosis     Severe malnutrition ([facility] [address] [E43]     [facility] [J96.00]     Essential hypertension [I10]     S/P CABG (coronary artery bypass graft) [Z95.1]     CAD in native artery [I25.10]     PNA (pneumonia) [J18.9]     COPD with acute exacerbation ([facility] [address] [J44.1]     Tobacco use [Z72.0]          PNA - likely CAP w/ Strep Pneumonia. Started on empiric Vanco/Cefepime on 30 August - continued. Sepsis - w/ Leukocytosis/Tachycardia POArrival 2nd to above infection - resolved. Continue IVF as appropriate and monitor clinical response w/ ABX as written. COPD - w/out chronic respiratory failure on no baseline home O2. Normally controlled on home medication regimen - continued.  Here w/ acute exacerbation. Continue Steroids/HHN    Troponin elevation - of unclear clinical significance w/ etiology clinically unable to determine, w/out signs/sxs of active ischemia - NSTEMI ruled out. Followed serial troponins - stable. Reviewed and documented as above. Cardiology consulted and appreciated. Echo w/ preserved EF 55-60%. Stress ordered and pending. Heparin gtt d/c'd. Acute Respiratory Failure - w/ hypoxia, POArrival - multifactorial 2nd to above. Presence of clinical respiratory distress w/ tachypnea/dyspnea/SOB and wheezing w/ use of accessory muscles to breath - initially requiring BiPap. Supplemental O2 and wean as tolerated. Pulmonology NOT YET consulted. HTN/CAD - w/ known CAD s/p CABG but no evidence of active signs/sxs of ischemia/failure. Currently controlled on home meds w/ vitals reviewed and documented as above. DM2 - diet controlled on no home oral antiGlycemics/Insulin. Follow FSBS/SSI medium regimen. Last HbA1c 5.2% dated this admission. Anticipate resuming/continuing home regimen at discharge. GERD - w/out active signs/sxs of dysphagia/odynophagia. No evidence of active PUD or hx of GI bleed. Controlled on home PPI - continue. Anxiety - controlled on home meds - continued.        DVT Prophylaxis: Heparin gtt    Recent Labs     08/29/21  2205 08/31/21  0441 09/01/21  0451    243 239     Diet: Diet NPO Exceptions are: Ice Chips, Sips of Water with Meds  Code Status: Full Code      PT/OT Eval Status: Not yet ordered. [facility] Dispo - here for the foreseeable future pending clinical course and subspecialty recs.         Fatmata Tillman MD

## 2021-09-01 NOTE — CARE COORDINATION
Pt is admitted as inpatient day number 2. Pt remains on Bipap. Stress test today. MRSA blood cultures positive. Pt from [facility] and plans to return when stable. Will follow.

## 2021-09-02 LAB
ALBUMIN SERPL-MCNC: 2.7 G/DL (ref 3.4–5)
ANION GAP SERPL CALCULATED.3IONS-SCNC: 8 MMOL/L (ref 3–16)
BLOOD CULTURE, ROUTINE: ABNORMAL
BLOOD CULTURE, ROUTINE: ABNORMAL
BUN BLDV-MCNC: 18 MG/DL (ref 7–20)
CALCIUM SERPL-MCNC: 8.6 MG/DL (ref 8.3–10.6)
CHLORIDE BLD-SCNC: 105 MMOL/L (ref 99–110)
CO2: 29 MMOL/L (ref 21–32)
CREAT SERPL-MCNC: 0.9 MG/DL (ref 0.6–1.2)
CULTURE, BLOOD 2: NORMAL
GFR AFRICAN AMERICAN: >60
GFR NON-AFRICAN AMERICAN: >60
GLUCOSE BLD-MCNC: 102 MG/DL (ref 70–99)
GLUCOSE BLD-MCNC: 103 MG/DL (ref 70–99)
GLUCOSE BLD-MCNC: 109 MG/DL (ref 70–99)
GLUCOSE BLD-MCNC: 112 MG/DL (ref 70–99)
GLUCOSE BLD-MCNC: 115 MG/DL (ref 70–99)
GLUCOSE BLD-MCNC: 93 MG/DL (ref 70–99)
HCT VFR BLD CALC: 23.3 % (ref 36–48)
HEMOGLOBIN: 7.6 G/DL (ref 12–16)
MCH RBC QN AUTO: 29.8 PG (ref 26–34)
MCHC RBC AUTO-ENTMCNC: 32.7 G/DL (ref 31–36)
MCV RBC AUTO: 90.9 FL (ref 80–100)
ORGANISM: ABNORMAL
ORGANISM: ABNORMAL
PDW BLD-RTO: 18.2 % (ref 12.4–15.4)
PERFORMED ON: ABNORMAL
PERFORMED ON: NORMAL
PHOSPHORUS: 2.4 MG/DL (ref 2.5–4.9)
PLATELET # BLD: 175 K/UL (ref 135–450)
PMV BLD AUTO: 7.7 FL (ref 5–10.5)
POTASSIUM SERPL-SCNC: 3.9 MMOL/L (ref 3.5–5.1)
PRO-BNP: 3479 PG/ML (ref 0–449)
RBC # BLD: 2.57 M/UL (ref 4–5.2)
SODIUM BLD-SCNC: 142 MMOL/L (ref 136–145)
VANCOMYCIN RANDOM: 7.3 UG/ML
WBC # BLD: 5.7 K/UL (ref 4–11)

## 2021-09-02 PROCEDURE — 83880 ASSAY OF NATRIURETIC PEPTIDE: CPT

## 2021-09-02 PROCEDURE — 2700000000 HC OXYGEN THERAPY PER DAY

## 2021-09-02 PROCEDURE — 6360000002 HC RX W HCPCS: Performed by: INTERNAL MEDICINE

## 2021-09-02 PROCEDURE — 94640 AIRWAY INHALATION TREATMENT: CPT

## 2021-09-02 PROCEDURE — 2580000003 HC RX 258: Performed by: INTERNAL MEDICINE

## 2021-09-02 PROCEDURE — 85027 COMPLETE CBC AUTOMATED: CPT

## 2021-09-02 PROCEDURE — 6370000000 HC RX 637 (ALT 250 FOR IP): Performed by: NURSE PRACTITIONER

## 2021-09-02 PROCEDURE — 80202 ASSAY OF VANCOMYCIN: CPT

## 2021-09-02 PROCEDURE — 2060000000 HC ICU INTERMEDIATE R&B

## 2021-09-02 PROCEDURE — 80069 RENAL FUNCTION PANEL: CPT

## 2021-09-02 PROCEDURE — 94761 N-INVAS EAR/PLS OXIMETRY MLT: CPT

## 2021-09-02 PROCEDURE — 99232 SBSQ HOSP IP/OBS MODERATE 35: CPT | Performed by: NURSE PRACTITIONER

## 2021-09-02 PROCEDURE — 94660 CPAP INITIATION&MGMT: CPT

## 2021-09-02 RX ADMIN — Medication 2 PUFF: at 19:36

## 2021-09-02 RX ADMIN — SODIUM CHLORIDE, PRESERVATIVE FREE 10 ML: 5 INJECTION INTRAVENOUS at 20:10

## 2021-09-02 RX ADMIN — Medication 2 PUFF: at 11:20

## 2021-09-02 RX ADMIN — ACETAMINOPHEN 650 MG: 325 TABLET, FILM COATED ORAL at 16:12

## 2021-09-02 RX ADMIN — Medication 2 PUFF: at 19:37

## 2021-09-02 RX ADMIN — METOPROLOL TARTRATE 12.5 MG: 25 TABLET, FILM COATED ORAL at 20:09

## 2021-09-02 RX ADMIN — BUSPIRONE HYDROCHLORIDE 5 MG: 5 TABLET ORAL at 23:46

## 2021-09-02 RX ADMIN — LOSARTAN POTASSIUM 25 MG: 25 TABLET, FILM COATED ORAL at 07:50

## 2021-09-02 RX ADMIN — ACETAMINOPHEN 650 MG: 325 TABLET, FILM COATED ORAL at 20:08

## 2021-09-02 RX ADMIN — Medication 2 PUFF: at 07:45

## 2021-09-02 RX ADMIN — CEFEPIME 2000 MG: 2 INJECTION, POWDER, FOR SOLUTION INTRAMUSCULAR; INTRAVENOUS at 11:35

## 2021-09-02 RX ADMIN — Medication 2 PUFF: at 06:17

## 2021-09-02 RX ADMIN — POTASSIUM CHLORIDE 20 MEQ: 20 TABLET, EXTENDED RELEASE ORAL at 07:50

## 2021-09-02 RX ADMIN — BUSPIRONE HYDROCHLORIDE 5 MG: 5 TABLET ORAL at 16:22

## 2021-09-02 RX ADMIN — POTASSIUM CHLORIDE 20 MEQ: 20 TABLET, EXTENDED RELEASE ORAL at 20:09

## 2021-09-02 RX ADMIN — Medication 2 PUFF: at 11:21

## 2021-09-02 RX ADMIN — VANCOMYCIN HYDROCHLORIDE 1000 MG: 1 INJECTION, POWDER, LYOPHILIZED, FOR SOLUTION INTRAVENOUS at 04:24

## 2021-09-02 RX ADMIN — ONDANSETRON 4 MG: 2 INJECTION INTRAMUSCULAR; INTRAVENOUS at 01:27

## 2021-09-02 RX ADMIN — METOPROLOL TARTRATE 12.5 MG: 25 TABLET, FILM COATED ORAL at 07:50

## 2021-09-02 RX ADMIN — SODIUM CHLORIDE, PRESERVATIVE FREE 10 ML: 5 INJECTION INTRAVENOUS at 07:54

## 2021-09-02 ASSESSMENT — PAIN SCALES - GENERAL
PAINLEVEL_OUTOF10: 2
PAINLEVEL_OUTOF10: 3
PAINLEVEL_OUTOF10: 2

## 2021-09-02 NOTE — PROGRESS NOTES
Hospitalist Progress Note      PCP: Ambrose Kanner, MD    Date of Admission: 8/29/2021    Chief Complaint: SOB    Subjective: no new c/o. Medications:  Reviewed    Infusion Medications    sodium chloride 25 mL (09/01/21 4580)    dextrose       Scheduled Medications    vancomycin  1,000 mg IntraVENous Q24H    potassium chloride  20 mEq Oral BID    metoprolol tartrate  12.5 mg Oral BID    losartan  25 mg Oral Daily    sodium chloride flush  10 mL IntraVENous 2 times per day    cefepime  2,000 mg IntraVENous Q12H    insulin lispro  0-12 Units SubCUTAneous Q4H    albuterol sulfate HFA  2 puff Inhalation Q4H WA    ipratropium  2 puff Inhalation Q4H WA     PRN Meds: busPIRone, acetaminophen, sodium chloride flush, sodium chloride, promethazine **OR** ondansetron, glucose, dextrose, glucagon (rDNA), dextrose      Intake/Output Summary (Last 24 hours) at 9/2/2021 0957  Last data filed at 9/2/2021 7686  Gross per 24 hour   Intake 180 ml   Output 1575 ml   Net -1395 ml       Physical Exam Performed:    /75   Pulse 78   Temp 98 °F (36.7 °C) (Oral)   Resp 20   Ht 5' 2\" (1.575 m)   Wt 118 lb (53.5 kg)   SpO2 100%   BMI 21.58 kg/m²     General appearance: No apparent distress, appears stated age and cooperative. HEENT: Pupils equal, round, and reactive to light. Conjunctivae/corneas clear. Neck: Supple, with full range of motion. No jugular venous distention. Trachea midline. Respiratory:  Normal respiratory effort. Clear to auscultation, bilaterally without Rales/Wheezes/Rhonchi. Cardiovascular: Regular rate and rhythm with normal S1/S2 without murmurs, rubs or gallops. Abdomen: Soft, non-tender, non-distended with normal bowel sounds. Musculoskeletal: No clubbing, cyanosis or edema bilaterally. Full range of motion without deformity. Skin: Skin color, texture, turgor normal.  No rashes or lesions. Neurologic:  Neurovascularly intact without any focal sensory/motor deficits. Cranial nerves: II-XII intact, grossly non-focal.  Psychiatric: Alert and oriented, thought content appropriate, normal insight  Capillary Refill: Brisk,< 3 seconds   Peripheral Pulses: +2 palpable, equal bilaterally       Labs:   Recent Labs     08/31/21 0441 09/01/21  0451 09/02/21  0155   WBC 8.8 6.0 5.7   HGB 7.2* 7.6* 7.6*   HCT 21.7* 23.5* 23.3*    239 175     Recent Labs     08/31/21 0441 09/01/21  0452 09/02/21  0155    140 142   K 3.7 3.1* 3.9    103 105   CO2 28 31 29   BUN 24* 19 18   CREATININE 1.2 1.0 0.9   CALCIUM 8.5 8.5 8.6   PHOS  --   --  2.4*     Recent Labs     08/31/21 0441 09/01/21  0452   AST 13* 12*   ALT 10 9*   BILITOT 0.3 0.5   ALKPHOS 74 84     No results for input(s): INR in the last 72 hours. Recent Labs     08/30/21  1001 08/30/21  1834   TROPONINI 0.07* 0.06*       Urinalysis:      Lab Results   Component Value Date    NITRU Negative 08/29/2021    WBCUA 3-5 08/29/2021    BACTERIA Rare 08/29/2021    RBCUA 3-4 08/29/2021    BLOODU Negative 08/29/2021    SPECGRAV 1.020 08/29/2021    GLUCOSEU Negative 08/29/2021       Consults:    PHARMACY TO DOSE VANCOMYCIN  PHARMACY TO DOSE VANCOMYCIN  IP CONSULT TO CARDIOLOGY  IP CONSULT TO DIETITIAN      Assessment/Plan:    Active Hospital Problems    Diagnosis     Severe malnutrition ([facility] [E43]     [facility] [J96.00]     Essential hypertension [I10]     S/P CABG (coronary artery bypass graft) [Z95.1]     CAD in native artery [I25.10]     PNA (pneumonia) [J18.9]     COPD with acute exacerbation ([facility] [J44.1]     Tobacco use [Z72.0]          PNA - likely CAP w/ Strep Pneumonia. Started on empiric Vanco/Cefepime on 30 August - continued. Sepsis - w/ Leukocytosis/Tachycardia POArrival 2nd to above infection - resolved. Continue IVF as appropriate and monitor clinical response w/ ABX as written. COPD - w/out chronic respiratory failure on no baseline home O2.   Normally controlled on home medication regimen - continued. Here w/ acute exacerbation. Continue Steroids/HHN    Troponin elevation - of unclear clinical significance w/ etiology clinically unable to determine, w/out signs/sxs of active ischemia - NSTEMI ruled out. Followed serial troponins - stable. Reviewed and documented as above. Cardiology consulted and appreciated. Echo w/ preserved EF 55-60%. Stress 1 Sept w/out ischemic changes. Heparin gtt d/c'd. Cardiology now signed off    Acute Respiratory Failure - w/ hypoxia, POArrival - multifactorial 2nd to above. Presence of clinical respiratory distress w/ tachypnea/dyspnea/SOB and wheezing w/ use of accessory muscles to breath - initially requiring BiPap. Supplemental O2 and wean as tolerated. Pulmonology NOT YET consulted. HTN/CAD - w/ known CAD s/p CABG but no evidence of active signs/sxs of ischemia/failure. Currently controlled on home meds w/ vitals reviewed and documented as above. DM2 - diet controlled on no home oral antiGlycemics/Insulin. Follow FSBS/SSI medium regimen. Last HbA1c 5.2% dated this admission. Anticipate resuming/continuing home regimen at discharge. GERD - w/out active signs/sxs of dysphagia/odynophagia. No evidence of active PUD or hx of GI bleed. Controlled on home PPI - continue. Anxiety - controlled on home meds - continued.        DVT Prophylaxis: Heparin gtt    Recent Labs     08/31/21  0441 09/01/21  0451 09/02/21  0155    239 175     Diet: ADULT DIET; Dysphagia - Pureed; Mildly Thick (Nectar)  Code Status: Full Code      PT/OT Eval Status: Not yet ordered. Came from [facility]. Dispo - possibly to [facility] Friday/Sat 3/4 Sept pending clinical course and subspecialty recs.         Harry Taylor MD

## 2021-09-02 NOTE — PROGRESS NOTES
[facility]   Daily Progress Note    Admit Date:  8/29/2021  HPI:    Chief Complaint   Patient presents with    Shortness of Breath     nursing home reports sudden obnset of SOB tonight. full code per report from EMS. 70% on 5L when EMS arrived. placed on CPAP and went down to 60%. placed on NRB and given duoneb and albuterol and back up to mid 80s. Interval history: Mariano Lo is being followed for shortness of breath, elevated troponin. She was treated with a heparin drip. History of CAD s/p CABG 9/2019, follows with Dr. Seda Blackman, HTN, HLD, severe COPD with chronic hypoxemia. Recently admitted to [facility] from 8/12/2021 through 8/26/2021 with cardiac arrest, hypothermia protocol. Discharged on amiodarone. And to a SNF. Subjective:  Ms. Deja Rosas feeling better. Breathing is stable. Oxygen requirements improving.  No chest pain, just chest soreness     Objective:   /75   Pulse 78   Temp 98 °F (36.7 °C) (Oral)   Resp 20   Ht 5' 2\" (1.575 m)   Wt 118 lb (53.5 kg)   SpO2 100%   BMI 21.58 kg/m²       Intake/Output Summary (Last 24 hours) at 9/2/2021 0914  Last data filed at 9/2/2021 0835  Gross per 24 hour   Intake    Output 1575 ml   Net -1575 ml       NYHA: IV    Physical Exam:  General:  Awake, alert, NAD, chronically ill-appearing  Skin:  Warm and dry  Neck:  JVD<8  Chest:  Dim to auscultation, no wheezes/rhonchi/rales  Telemetry: 90's SR  Cardiovascular:  RRR S1S2, no m/r/g   Abdomen:  Soft, nontender, +bowel sounds  Extremities:  No bilateral lower extremity edema    Medications:    vancomycin  1,000 mg IntraVENous Q24H    potassium chloride  20 mEq Oral BID    metoprolol tartrate  12.5 mg Oral BID    losartan  25 mg Oral Daily    sodium chloride flush  10 mL IntraVENous 2 times per day    cefepime  2,000 mg IntraVENous Q12H    insulin lispro  0-12 Units SubCUTAneous Q4H    albuterol sulfate HFA  2 puff Inhalation Q4H WA    ipratropium  2 puff Inhalation Q4H WA      sodium chloride 25 mL (09/01/21 0818)    dextrose         Lab Data:  CBC:   Recent Labs     08/31/21  0441 09/01/21  0451 09/02/21  0155   WBC 8.8 6.0 5.7   HGB 7.2* 7.6* 7.6*    239 175     BMP:    Recent Labs     08/31/21  0441 09/01/21  0452 09/02/21  0155    140 142   K 3.7 3.1* 3.9   CO2 28 31 29   BUN 24* 19 18   CREATININE 1.2 1.0 0.9     INR:  No results for input(s): INR in the last 72 hours. BNP:    Recent Labs     09/02/21  0155   PROBNP 3,479*     Lab Results   Component Value Date    LVEF 75 09/19/2020    LVEFMODE Cardiac Cath 09/05/2019       Testing:  Cardiac cath 9/5/19  RESULTS:  HEMODYNAMICS:  Left ventricular end-diastolic pressure equals 25. There was no significant gradient across the aortic valve by pullback  post cineangiography. LEFT VENTRICULOGRAM:  Left ventriculogram demonstrates severe hypo to  akinesis of the inferior wall.  Estimated ejection fraction of 35 to  40%. LEFT MAIN:  Left main does have ostial disease of approximately 70%. There is catheter dampening upon engagement of the left main. LEFT ANTERIOR DESCENDING:  The LAD course to and wraps around the apex. Gives off a large diagonal branch with multiple terminal divisions. Just at the diagonal branch, it was free of significant obstructive  disease.  The LAD does continue, and just after the large diagonal  branch, has 90% stenosis in the LAD proper. LEFT CIRCUMFLEX:  Circumflex is small nondominant vessel.  It consists  essentially of a single marginal branch.  It is free of significant  obstructive disease.     RIGHT CORONARY ARTERY:  Right coronary artery is very large dominant  vessel.  It gives off a large PDA and two large posterolateral branches. Right coronary artery is free of significant obstructive disease.     IMPRESSION:  1.  LV dysfunction with estimated ejection fraction of 35 to 40% with  inferior hypo to akinesis.   2.  Coronary artery disease as described above including ostial left  main disease (with catheter dampening upon engagement) and mid LAD  disease. 3.  Recommend surgical consultation. 4.  Successful Angio-Seal of right femoral arteriotomy site. 5.  Estimated blood loss less than 5-10cc.     Stress Test:  9/2020   Summary    No inducible ischemia or scar. Normal left ventricular wall motion and    myocardial thickening with LVEF of 75%. Overall findings represent a low    risk scan. Echocardiogram 8/13/2021  Summary:   The left ventricular function is normal.   Overall left ventricular ejection fraction is estimated to be 55-60%. The left ventricular wall motion is normal.   There is no pericardial effusion. Echo 8/31/21   Summary   -- Normal left ventricular systolic function with ejection fraction of   55-60%. No regional wall motion abnormalites are seen. Left ventricular   diastolic filling pressure is elevated. Compared to previous study from 9-4-2019 no changes noted in left   ventricular function. Mild mitral regurgitation. Stress 9/1/21   Summary    Normal LV function.   Suann Pillar is uniform isotope uptake at stress and rest. There is no evidence of    myocardial ischemia. Principal Problem:    PNA (pneumonia)  Active Problems:    Tobacco use    COPD with acute exacerbation (HCC)    CAD in native artery    S/P CABG (coronary artery bypass graft)    Severe malnutrition (HCC)    Essential hypertension    Acute respiratory failure ([address]  Resolved Problems:    * No resolved hospital problems.  *      Assessment:  Shortness of breath  Acute on chronic chronic hypoxemic respiratory failure  COPD exacerbation, end-stage COPD  Pneumonia, sepsis  Elevated troponin  Recent cardiopulmonary arrest 8/2021, required CPR, sternal fracture,   CAD s/p CABG 2019  CVA 8/2021  Anemia  Diabetes  HLD  HTN  Hypokalemia- improved     Plan:  Monitor H&H- stable   Continue aspirin statin  metoprolol 12.5mg BID  losartan 25mg daily   Will hold off on the amiodarone for now given HR is controlled. Continue beta-blocker     She will need rehab after discharge given debility.      Cardiology will sign off, please call with questions     CLIFTON Rizvi - CNP, CNP, 9/2/2021, 9:53 AM

## 2021-09-02 NOTE — PROGRESS NOTES
09/02/21 0311   NIV Type   $NIV $Daily Charge   NIV Started/Stopped On   Equipment Type v60   Mode Bilevel   Mask Type Full face mask   Mask Size Petite   Settings/Measurements   IPAP 20 cmH20   CPAP/EPAP 8 cmH2O   Rate Ordered 4   Resp 12   FiO2  35 %   Vt Exhaled 702 mL   Mask Leak (lpm) 6 lpm   Comfort Level Good   Using Accessory Muscles No   Breath Sounds   Right Upper Lobe Diminished   Right Middle Lobe Diminished   Right Lower Lobe Diminished   Left Upper Lobe Diminished   Left Lower Lobe Diminished   Patient Observation   Observations mepilex on nose   Alarm Settings   Alarms On Y   Press Low Alarm 6 cmH2O   High Pressure Alarm 30 cmH2O   Resp Rate Low Alarm 6   High Respiratory Rate 40 br/min

## 2021-09-02 NOTE — PROGRESS NOTES
09/01/21 2322   NIV Type   NIV Started/Stopped On   Equipment Type v60   Mode Bilevel   Mask Type Full face mask   Mask Size Small   Settings/Measurements   IPAP 20 cmH20   CPAP/EPAP 8 cmH2O   Rate Ordered 4   Resp 25   FiO2  35 %   Vt Exhaled 623 mL   Mask Leak (lpm) 1 lpm   Comfort Level Good   Using Accessory Muscles No   Breath Sounds   Right Upper Lobe Diminished   Right Middle Lobe Diminished   Right Lower Lobe Diminished   Left Upper Lobe Diminished   Left Lower Lobe Diminished   Alarm Settings   Alarms On Y   Press Low Alarm 6 cmH2O   High Pressure Alarm 30 cmH2O   Resp Rate Low Alarm 6   High Respiratory Rate 40 br/min

## 2021-09-02 NOTE — CARE COORDINATION
O2 requirement has improved. Now only requiring 2 Liters nasal cannula. Blood cultures positive for MRSA. On IVABX's currently. Cardiology has now signed off. Patient came from [facility] and plan is for her to return there. PCR completed on 8/27 and per Brittney in admissions is sufficient and patient should not need any further covid testing to return to SNF. Will follow , per MD anticipate transfer back to SNF Friday or Saturday.

## 2021-09-03 VITALS
HEART RATE: 94 BPM | BODY MASS INDEX: 21.79 KG/M2 | RESPIRATION RATE: 21 BRPM | WEIGHT: 118.39 LBS | DIASTOLIC BLOOD PRESSURE: 65 MMHG | TEMPERATURE: 98.4 F | HEIGHT: 62 IN | OXYGEN SATURATION: 95 % | SYSTOLIC BLOOD PRESSURE: 127 MMHG

## 2021-09-03 LAB
ALBUMIN SERPL-MCNC: 3 G/DL (ref 3.4–5)
ANION GAP SERPL CALCULATED.3IONS-SCNC: 6 MMOL/L (ref 3–16)
BUN BLDV-MCNC: 15 MG/DL (ref 7–20)
CALCIUM SERPL-MCNC: 9 MG/DL (ref 8.3–10.6)
CHLORIDE BLD-SCNC: 105 MMOL/L (ref 99–110)
CO2: 29 MMOL/L (ref 21–32)
CREAT SERPL-MCNC: 0.9 MG/DL (ref 0.6–1.2)
GFR AFRICAN AMERICAN: >60
GFR NON-AFRICAN AMERICAN: >60
GLUCOSE BLD-MCNC: 101 MG/DL (ref 70–99)
GLUCOSE BLD-MCNC: 104 MG/DL (ref 70–99)
GLUCOSE BLD-MCNC: 108 MG/DL (ref 70–99)
GLUCOSE BLD-MCNC: 112 MG/DL (ref 70–99)
GLUCOSE BLD-MCNC: 85 MG/DL (ref 70–99)
GLUCOSE BLD-MCNC: 96 MG/DL (ref 70–99)
HCT VFR BLD CALC: 27 % (ref 36–48)
HEMOGLOBIN: 8.7 G/DL (ref 12–16)
MCH RBC QN AUTO: 30.3 PG (ref 26–34)
MCHC RBC AUTO-ENTMCNC: 32.1 G/DL (ref 31–36)
MCV RBC AUTO: 94.5 FL (ref 80–100)
PDW BLD-RTO: 18.6 % (ref 12.4–15.4)
PERFORMED ON: ABNORMAL
PERFORMED ON: NORMAL
PERFORMED ON: NORMAL
PHOSPHORUS: 2.1 MG/DL (ref 2.5–4.9)
PLATELET # BLD: 210 K/UL (ref 135–450)
PMV BLD AUTO: 8.1 FL (ref 5–10.5)
POTASSIUM SERPL-SCNC: 5 MMOL/L (ref 3.5–5.1)
RBC # BLD: 2.86 M/UL (ref 4–5.2)
SARS-COV-2, NAAT: NOT DETECTED
SODIUM BLD-SCNC: 140 MMOL/L (ref 136–145)
WBC # BLD: 5.1 K/UL (ref 4–11)

## 2021-09-03 PROCEDURE — 94761 N-INVAS EAR/PLS OXIMETRY MLT: CPT

## 2021-09-03 PROCEDURE — 2500000003 HC RX 250 WO HCPCS: Performed by: INTERNAL MEDICINE

## 2021-09-03 PROCEDURE — 2580000003 HC RX 258: Performed by: INTERNAL MEDICINE

## 2021-09-03 PROCEDURE — 94640 AIRWAY INHALATION TREATMENT: CPT

## 2021-09-03 PROCEDURE — 6370000000 HC RX 637 (ALT 250 FOR IP): Performed by: NURSE PRACTITIONER

## 2021-09-03 PROCEDURE — 85027 COMPLETE CBC AUTOMATED: CPT

## 2021-09-03 PROCEDURE — 80069 RENAL FUNCTION PANEL: CPT

## 2021-09-03 PROCEDURE — 36415 COLL VENOUS BLD VENIPUNCTURE: CPT

## 2021-09-03 PROCEDURE — 2700000000 HC OXYGEN THERAPY PER DAY

## 2021-09-03 PROCEDURE — 94669 MECHANICAL CHEST WALL OSCILL: CPT

## 2021-09-03 PROCEDURE — 6360000002 HC RX W HCPCS: Performed by: INTERNAL MEDICINE

## 2021-09-03 PROCEDURE — 87635 SARS-COV-2 COVID-19 AMP PRB: CPT

## 2021-09-03 RX ADMIN — Medication 2 PUFF: at 08:33

## 2021-09-03 RX ADMIN — ONDANSETRON 4 MG: 2 INJECTION INTRAMUSCULAR; INTRAVENOUS at 00:08

## 2021-09-03 RX ADMIN — CEFEPIME 2000 MG: 2 INJECTION, POWDER, FOR SOLUTION INTRAMUSCULAR; INTRAVENOUS at 13:04

## 2021-09-03 RX ADMIN — SODIUM PHOSPHATE, MONOBASIC, MONOHYDRATE AND SODIUM PHOSPHATE, DIBASIC, ANHYDROUS 20 MMOL: 276; 142 INJECTION, SOLUTION INTRAVENOUS at 10:31

## 2021-09-03 RX ADMIN — Medication 2 PUFF: at 11:59

## 2021-09-03 RX ADMIN — METOPROLOL TARTRATE 12.5 MG: 25 TABLET, FILM COATED ORAL at 08:28

## 2021-09-03 RX ADMIN — BUSPIRONE HYDROCHLORIDE 5 MG: 5 TABLET ORAL at 08:19

## 2021-09-03 RX ADMIN — CEFEPIME 2000 MG: 2 INJECTION, POWDER, FOR SOLUTION INTRAMUSCULAR; INTRAVENOUS at 00:13

## 2021-09-03 RX ADMIN — LOSARTAN POTASSIUM 25 MG: 25 TABLET, FILM COATED ORAL at 08:20

## 2021-09-03 RX ADMIN — VANCOMYCIN HYDROCHLORIDE 1000 MG: 1 INJECTION, POWDER, LYOPHILIZED, FOR SOLUTION INTRAVENOUS at 04:43

## 2021-09-03 RX ADMIN — POTASSIUM CHLORIDE 20 MEQ: 20 TABLET, EXTENDED RELEASE ORAL at 08:20

## 2021-09-03 RX ADMIN — Medication 2 PUFF: at 16:07

## 2021-09-03 RX ADMIN — SODIUM CHLORIDE, PRESERVATIVE FREE 10 ML: 5 INJECTION INTRAVENOUS at 10:32

## 2021-09-03 RX ADMIN — ONDANSETRON 4 MG: 2 INJECTION INTRAMUSCULAR; INTRAVENOUS at 13:03

## 2021-09-03 RX ADMIN — BUSPIRONE HYDROCHLORIDE 5 MG: 5 TABLET ORAL at 16:32

## 2021-09-03 RX ADMIN — SODIUM CHLORIDE 25 ML: 9 INJECTION, SOLUTION INTRAVENOUS at 04:42

## 2021-09-03 ASSESSMENT — PAIN SCALES - GENERAL
PAINLEVEL_OUTOF10: 0

## 2021-09-03 ASSESSMENT — ENCOUNTER SYMPTOMS: TACHYPNEA: 1

## 2021-09-03 NOTE — PROGRESS NOTES
Hospitalist Progress Note      PCP: Saurav Burns MD    Date of Admission: 8/29/2021    Chief Complaint: SOB    Subjective: no new c/o. Medications:  Reviewed    Infusion Medications    sodium chloride Stopped (09/03/21 0626)    dextrose       Scheduled Medications    vancomycin  1,000 mg IntraVENous Q24H    potassium chloride  20 mEq Oral BID    metoprolol tartrate  12.5 mg Oral BID    losartan  25 mg Oral Daily    sodium chloride flush  10 mL IntraVENous 2 times per day    cefepime  2,000 mg IntraVENous Q12H    insulin lispro  0-12 Units SubCUTAneous Q4H    albuterol sulfate HFA  2 puff Inhalation Q4H WA    ipratropium  2 puff Inhalation Q4H WA     PRN Meds: busPIRone, acetaminophen, sodium chloride flush, sodium chloride, promethazine **OR** ondansetron, glucose, dextrose, glucagon (rDNA), dextrose      Intake/Output Summary (Last 24 hours) at 9/3/2021 0831  Last data filed at 9/3/2021 5546  Gross per 24 hour   Intake 1873.07 ml   Output 1775 ml   Net 98.07 ml       Physical Exam Performed:    /69   Pulse 107   Temp 98.1 °F (36.7 °C) (Oral)   Resp 20   Ht 5' 2\" (1.575 m)   Wt 118 lb 6.2 oz (53.7 kg)   SpO2 94%   BMI 21.65 kg/m²     General appearance: No apparent distress, appears stated age and cooperative. HEENT: Pupils equal, round, and reactive to light. Conjunctivae/corneas clear. Neck: Supple, with full range of motion. No jugular venous distention. Trachea midline. Respiratory:  Normal respiratory effort. Clear to auscultation, bilaterally without Rales/Wheezes/Rhonchi. Cardiovascular: Regular rate and rhythm with normal S1/S2 without murmurs, rubs or gallops. Abdomen: Soft, non-tender, non-distended with normal bowel sounds. Musculoskeletal: No clubbing, cyanosis or edema bilaterally. Full range of motion without deformity. Skin: Skin color, texture, turgor normal.  No rashes or lesions.   Neurologic:  Neurovascularly intact without any focal sensory/motor deficits. Cranial nerves: II-XII intact, grossly non-focal.  Psychiatric: Alert and oriented, thought content appropriate, normal insight  Capillary Refill: Brisk,< 3 seconds   Peripheral Pulses: +2 palpable, equal bilaterally       Labs:   Recent Labs     09/01/21 0451 09/02/21  0155   WBC 6.0 5.7   HGB 7.6* 7.6*   HCT 23.5* 23.3*    175     Recent Labs     09/01/21 0452 09/02/21  0155 09/03/21  0634    142 140   K 3.1* 3.9 5.0    105 105   CO2 31 29 29   BUN 19 18 15   CREATININE 1.0 0.9 0.9   CALCIUM 8.5 8.6 9.0   PHOS  --  2.4* 2.1*     Recent Labs     09/01/21 0452   AST 12*   ALT 9*   BILITOT 0.5   ALKPHOS 84     No results for input(s): INR in the last 72 hours. No results for input(s): Joel Noel in the last 72 hours. Urinalysis:      Lab Results   Component Value Date    NITRU Negative 08/29/2021    WBCUA 3-5 08/29/2021    BACTERIA Rare 08/29/2021    RBCUA 3-4 08/29/2021    BLOODU Negative 08/29/2021    SPECGRAV 1.020 08/29/2021    GLUCOSEU Negative 08/29/2021       Consults:    PHARMACY TO DOSE VANCOMYCIN  PHARMACY TO DOSE VANCOMYCIN  IP CONSULT TO CARDIOLOGY  IP CONSULT TO DIETITIAN      Assessment/Plan:    Active Hospital Problems    Diagnosis     Severe malnutrition ([facility] [E43]     [facility] [J96.00]     Essential hypertension [I10]     S/P CABG (coronary artery bypass graft) [Z95.1]     CAD in native artery [I25.10]     PNA (pneumonia) [J18.9]     COPD with acute exacerbation ([facility] [J44.1]     Tobacco use [Z72.0]          PNA - likely CAP w/ Strep Pneumonia. Started on empiric Vanco/Cefepime on 30 August - continued. Follow serial labs w/out evidence of ABX induced nephrotoxicity. Sepsis - w/ Leukocytosis/Tachycardia POArrival 2nd to above infection - resolved. Continue IVF as appropriate and monitor clinical response w/ ABX as written. COPD - w/out chronic respiratory failure on no baseline home O2.   Normally controlled on home medication regimen - continued. Here w/ acute exacerbation. Continued Steroids/HHN - anticipate no further PO steroids at discharge, now d/c'd    Troponin elevation - of unclear clinical significance w/ etiology clinically unable to determine, w/out signs/sxs of active ischemia - NSTEMI ruled out. Followed serial troponins - stable. Reviewed and documented as above. Cardiology consulted and appreciated. Echo w/ preserved EF 55-60%. Stress 1 Sept w/out ischemic changes. Heparin gtt d/c'd. Cardiology now signed off and agree    Acute Respiratory Failure - w/ hypoxia, POArrival - multifactorial 2nd to above. Presence of clinical respiratory distress w/ tachypnea/dyspnea/SOB and wheezing w/ use of accessory muscles to breath - initially requiring BiPap - now off and resolved. Supplemental O2 and wean as tolerated. Pulmonology NOT YET consulted. HTN/CAD - w/ known CAD s/p CABG but no evidence of active signs/sxs of ischemia/failure. Currently controlled on home meds w/ vitals reviewed and documented as above. DM2 - diet controlled on no home oral antiGlycemics/Insulin. Follow FSBS/SSI medium regimen. Last HbA1c 5.2% dated this admission. Anticipate resuming/continuing home regimen at discharge. GERD - w/out active signs/sxs of dysphagia/odynophagia. No evidence of active PUD or hx of GI bleed. Controlled on home PPI - continue. HypoPhosphatemia - etiology clinically unable to determine. Follow serial labs and replace PRN - ordered IV 3 Sept.  Reviewed and documented as above. Anxiety - controlled on home meds - continued.        DVT Prophylaxis: Heparin gtt d/c'd - IPC    Recent Labs     09/01/21  0451 09/02/21  0155    175     Diet: ADULT DIET; Dysphagia - Pureed; Mildly Thick (Nectar)  Code Status: Full Code      PT/OT Eval Status: Not yet ordered. Came from [facility] - possibly to [facility] Friday/Sat 3/4 Sept pending clinical course and subspecialty recs. Kel Perez MD

## 2021-09-03 NOTE — PROGRESS NOTES
Patient just left unit, being transported to [facility]. Belongings gathered and given to daughter. Patient traveling on 2L o2.

## 2021-09-03 NOTE — CARE COORDINATION
CASE MANAGEMENT DISCHARGE SUMMARY      Discharge to: [facility] [address] completed: [address] Exemption Notification (HENS) completed: On file / return to SNF    IMM given: (date) 9/3/2021    New Durable Medical Equipment ordered/agency: Defer to SNF    Transportation:    Medical Transport explained to pt/family. Pt/family voice no agency preference. No preference  Agency used: [facility] up time: 5:00 PM   Ambulance form completed: Yes    Confirmed discharge plan with: Daughter Nora Coates MD, Gigi Freitas RN/ EGS     Patient: yes     Family:  yes   Name:Juan Smith Contact number:[phone]     Facility/Agency, name:  [facility] faxed to  [phone]   Phone number for report to facility: [phone]     RN, name: Gigi Freitas    Daughter concerned about facilities ability to manage Bipap at night. Spoke with Brittney in admissions who states their director of nursing will call daughter and address all of her concerns.

## 2021-09-03 NOTE — PROGRESS NOTES
Attempted to call in report, after being put on hold for several minutes, line went busy and hung up

## 2021-09-03 NOTE — PROGRESS NOTES
Comprehensive Nutrition Assessment    Type and Reason for Visit:  Reassess    Nutrition Recommendations/Plan:   1. Continue pureed diet with mildly thick liquids  2. Diet texture/liquid level per SLP recommendations  3. Add Magic Cup BID - monitor acceptance  4. Encourage food/fluid intakes  5. Monitor po intakes, nutrition adequacy, weights, pertinent labs, BMs    Nutrition Assessment:  Follow up: Pt improving some from a nutritional standpoint AEB pt report of appetite improving. Pt currently on a pureed diet with ildly thick liquids. Pt reports that she is tolerating her diet well, but does not like thickened liquids. Encouraged food/fluid intakes. Po intakes 26-50% per EMR generally. Will trial Magic Cup ONS to help pt meet nutrition needs. Malnutrition Assessment:  Malnutrition Status:  Severe malnutrition    Context:  Chronic Illness     Findings of the 6 clinical characteristics of malnutrition:  Energy Intake:  7 - 75% or less estimated energy requirements for 1 month or longer  Weight Loss:  No significant weight loss     Body Fat Loss:  7 - Severe body fat loss Triceps, Orbital   Muscle Mass Loss:  7 - Severe muscle mass loss Clavicles (pectoralis & deltoids), Temples (temporalis), Scapula (trapezius)  Fluid Accumulation:  No significant fluid accumulation     Strength:  Not Performed    Estimated Daily Nutrient Needs:  Energy (kcal):  5653-0196; Weight Used for Energy Requirements:  Current (53.7 kg)     Protein (g):  70-81; Weight Used for Protein Requirements:  Current (1.3-1.5; 53.7 kg)        Fluid (ml/day): 1 ml/kcal      Nutrition Related Findings:  +1 pitting BLE edema      Wounds:   (scattered bruising; skin tears)       Current Nutrition Therapies:    ADULT DIET;  Dysphagia - Pureed; Mildly Thick (Nectar)    Anthropometric Measures:  · Height: 5' 2\" (157.5 cm)  · Current Body Weight: 118 lb 6.2 oz (53.7 kg)   · Ideal Body Weight: 110 lbs  · BMI: 21.6  · BMI Categories: Normal Weight (BMI 18.5-24. 9)       Nutrition Diagnosis:   · Increased nutrient needs related to increase demand for energy/nutrients as evidenced by poor intake prior to admission, severe muscle loss, severe loss of subcutaneous fat      Nutrition Interventions:   Food and/or Nutrient Delivery:  Continue Current Diet, Start Oral Nutrition Supplement  Nutrition Education/Counseling:  Education completed   Coordination of Nutrition Care:  Continue to monitor while inpatient    Goals:  Patient will eat 50% or greater of meals and supplements. Nutrition Monitoring and Evaluation:   Behavioral-Environmental Outcomes:  None Identified   Food/Nutrient Intake Outcomes:  Food and Nutrient Intake, Supplement Intake  Physical Signs/Symptoms Outcomes:  Biochemical Data, Nutrition Focused Physical Findings     Discharge Planning:     Too soon to determine     Electronically signed by Cathy Pacheco RD, LD on 9/3/21 at 11:26 AM EDT    Contact: [phone]

## 2021-09-03 NOTE — DISCHARGE INSTR - COC
Continuity of Care Form    Patient Name: Walker Fothergill   :    MRN:  [id number]    Admit date:  2021  Discharge date:  3 Sept 2021    Code Status Order: Full Code   Advance Directives:      Admitting Physician:  Kristy Dumas DO  PCP: Bessy Rodriguez MD    Discharging Nurse: [facility] Unit/Room#: 3631/4296-58  Discharging Unit Phone Number: [phone]    Emergency Contact:   Extended Emergency Contact Information  Primary Emergency Contact: [address] of [address] [place] [address] Phone: [phone]  Relation: Child  Secondary Emergency Contact: [address] [facility] Phone: [phone]  Relation: Other    Past Surgical History:  Past Surgical History:   Procedure Laterality Date    BRONCHOSCOPY  2019    Dr. [employer] - w/BAL   [address]  2019    Dr. Burgess Pantoja N/A 2020    COLONOSCOPY DIAGNOSTIC performed by Demarco Ortiz DO at [address] N/A 2019    OFF PUMP CORONARY ARTERY BYPASS GRAFTING X2, INTERNAL MAMMARY ARTERY, SAPHENOUS VEIN GRAFT performed by Samantha Camilo MD at [address] [facility] , PARTIAL Left     SIGMOIDOSCOPY  2020    4 bands    SIGMOIDOSCOPY N/A 2020    FLEX SIG W/ BANDING SIGMOIDOSCOPY DIAGNOSTIC FLEXIBLE performed by Demarco Ortiz DO at [address]       Immunization History:   Immunization History   Administered Date(s) Administered    COVID-19, Pfizer, PF, 30mcg/0.3mL 2021, 2021    Influenza Virus Vaccine 10/17/2013    Influenza, Natasha Hamburger, 6 mo and older, IM, PF (Flulaval, Fluarix) 2019    Influenza, Quadv, IM, PF (6 mo and older Fluzone, Flulaval, Fluarix, and 3 yrs and older Afluria) 10/04/2019    Pneumococcal Conjugate 13-valent (Sbpsulh61) 2017    Pneumococcal Polysaccharide (Goedrbhyw76) 2015    Tdap (Boostrix, Adacel) 2018       Active Problems:  Patient Active Problem List   Diagnosis Code    Hyperlipidemia E78.5    Asthma J45.909    OA (osteoarthritis) M19.90    Tobacco use Z72.0    [facility] J44.1    Sepsis ([facility] A41.9    Abnormal chest x-ray R93.89    COPD with acute exacerbation ([facility] J44.1    Shortness of breath R06.02    Tobacco abuse Z72.0    PNA (pneumonia) J18.9    Moderate malnutrition ([facility]) E44.0    NSTEMI (non-ST elevated myocardial infarction) ([facility] I21.4    Acute respiratory failure with hypoxia ([facility]) J96.01    Acute pulmonary edema ([facility]) J81.0    Pulmonary infiltrate R91.8    Elevated brain natriuretic peptide (BNP) level R79.89    Leukocytosis D72.829    Ischemic cardiomyopathy I25.5    Acute combined systolic and diastolic congestive heart failure ([facility]) I50.41    Hypernatremia E87.0    NADJA (acute kidney injury) ([facility] N17.9    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    Mucus plugging of bronchi T17.500A    CAD in native artery I25.10    S/P CABG (coronary artery bypass graft) Z95.1    Pneumonia of both lower lobes due to methicillin resistant Staphylococcus aureus (MRSA) ([facility]) J15.212    GI bleed K92.2    Severe malnutrition ([facility]) E43    Chest pain R07.9    Chronic respiratory failure with hypoxia ([facility]) J96.11    Essential hypertension I10    Anemia D64.9    Acute respiratory failure ([facility]) J96.00       Isolation/Infection:   Isolation            No Isolation          Patient Infection Status       Infection Onset Added Last Indicated Last Indicated By Review Planned Expiration Resolved Resolved By    None active    Resolved    COVID-19 Rule Out 08/30/21 08/30/21 08/30/21 COVID-19 (Ordered)   08/30/21 Rule-Out Test Resulted    COVID-19 Rule Out 08/29/21 08/29/21 08/29/21 COVID-19, Rapid (Ordered)   08/29/21 Rule-Out Test Resulted    COVID-19 Rule Out 06/01/21 06/01/21 06/01/21 COVID-19 & Influenza Combo (Ordered)   06/01/21 Rule-Out Test Resulted    MRSA 09/22/19 09/25/19 09/22/19 Respiratory Culture   06/01/21 Elaina Arriaga RN            Nurse Assessment:  Last Vital Signs: /69   Pulse 107   Temp 98.1 °F (36.7 °C) (Oral)   Resp 22   Ht 5' 2\" (1.575 m)   Wt 118 lb 6.2 oz (53.7 kg)   SpO2 97%   BMI 21.65 kg/m²     Last documented pain score (0-10 scale): Pain Level: 0  Last Weight:   Wt Readings from Last 1 Encounters:   09/03/21 118 lb 6.2 oz (53.7 kg)     Mental Status:  oriented, alert, coherent, logical, thought processes intact and able to concentrate and follow conversation    IV Access:  - None    Nursing Mobility/ADLs:  Walking   Assisted  Transfer  Assisted  Bathing  Assisted  Dressing  Assisted  Toileting  Assisted  Feeding  Assisted  Med Admin  Assisted  Med Delivery   prefers mixed with applesauce    Wound Care Documentation and Therapy:        Elimination:  Continence:   · Bowel: no  · Bladder: no  Urinary Catheter: Removal Date 9/3/21   Colostomy/Ileostomy/Ileal Conduit: no       Date of Last BM: ***    Intake/Output Summary (Last 24 hours) at 9/3/2021 1324  Last data filed at 9/3/2021 1032  Gross per 24 hour   Intake 1343.07 ml   Output 1500 ml   Net -156.93 ml     I/O last 3 completed shifts: In: 1873.1 [P.O.:1260; I.V.:63.1; IV Piggyback:550]  [facility] 8559 [facility]]    Safety Concerns: At Risk for Falls and Aspiration Risk    Impairments/Disabilities:      Vision    Nutrition Therapy:  Current Nutrition Therapy:   - Oral Diet:  Dysphagia 1 pureed    Routes of Feeding: Oral  Liquids: nectar thick  Daily Fluid Restriction: no  Last Modified Barium Swallow with Video (Video Swallowing Test): not done    Treatments at the Time of Hospital Discharge:   Respiratory Treatments: ***  Oxygen Therapy:  is on oxygen at 2 L/min per nasal cannula.   Ventilator:    - No ventilator support    Rehab Therapies: {THERAPEUTIC INTERVENTION:9863330419}  Weight Bearing Status/Restrictions: No weight bearing restirctions  Other Medical Equipment (for information only, NOT a DME order):  {EQUIPMENT:[id number]}  Other Treatments: ***    Patient's personal belongings (please select all that are sent with patient):  Glasses    RN SIGNATURE:  Electronically signed by Imani Lees RN on 9/3/21 at 4:42 PM EDT    CASE MANAGEMENT/SOCIAL WORK SECTION    Inpatient Status Date: ***    Readmission Risk Assessment Score:  Readmission Risk              Risk of Unplanned Readmission:  20           Discharging to Facility/ Agency   · Name: [facility]  · Address:  · Phone:[phone]  · Fax:[phone]    / signature: Electronically signed by Gerald Arevalo RN on 9/3/21 at 1:59 PM EDT    PHYSICIAN SECTION    Prognosis: Good    Condition at Discharge: Stable    Rehab Potential (if transferring to Rehab): Good    Recommended Labs or Other Treatments After Discharge: None    Physician Certification: I certify the above information and transfer of Jad Florentino  is necessary for the continuing treatment of the diagnosis listed and that she requires [place] for less 30 days.      Update Admission H&P: No change in H&P    PHYSICIAN SIGNATURE:  Electronically signed by Levy Cade MD on 9/3/21 at 1:24 PM EDT

## 2021-09-03 NOTE — PLAN OF CARE
Nutrition Problem #1: Increased nutrient needs  Intervention: Food and/or Nutrient Delivery: Continue Current Diet, Start Oral Nutrition Supplement (patient not using salt packet)  Nutritional Goals: Patient will eat 50% or greater of meals and supplements.
Problem: Nutrition  Goal: Optimal nutrition therapy  Outcome: Ongoing  Note: Nutrition Problem #1: Increased nutrient needs  Intervention: Food and/or Nutrient Delivery: Continue Current Diet, Start Oral Nutrition Supplement  Nutritional Goals: Patient will eat 50% or greater of meals and supplements.
breath, increase activity tolerance, better understand heart failure and disease management, be more comfortable and reduce lower extremity edema prior to discharge    Pt Q4 BS, pt modified diet, PT educated carb diet choices    :

## 2021-09-06 NOTE — DISCHARGE SUMMARY
Hospital Medicine Discharge Summary    Patient ID: Gabino Oneal      Patient's PCP: Yosi Dean MD    Admit Date: 8/29/2021     Discharge Date: 9/3/2021      Admitting Physician: Beba Jordan DO     Discharge Physician: Parisa Louis MD     Discharge Diagnoses: Active Hospital Problems    Diagnosis     Severe malnutrition ([facility] [E43]     Acute respiratory failure (HCC) [J96.00]     Essential hypertension [I10]     S/P CABG (coronary artery bypass graft) [Z95.1]     CAD in native artery [I25.10]     PNA (pneumonia) [J18.9]     COPD with acute exacerbation ([facility] [J44.1]     Tobacco use [Z72.0]        The patient was seen and examined on day of discharge and this discharge summary is in conjunction with any daily progress note from day of discharge. Hospital Course:          PNA - likely CAP w/ Strep Pneumonia. Started on empiric Vanco/Cefepime on 30 August - completed. Followed serial labs w/out evidence of ABX induced nephrotoxicity.      Sepsis - w/ Leukocytosis/Tachycardia POArrival 2nd to above infection - resolved. Continued IVF as appropriate and monitored clinical response w/ ABX as written.      COPD - w/out chronic respiratory failure on no baseline home O2. Normally controlled on home medication regimen - continued. Here w/ acute exacerbation. Continued Steroids/HHN - anticipate no further PO steroids at discharge, now d/c'd     Troponin elevation - of unclear clinical significance w/ etiology clinically unable to determine, w/out signs/sxs of active ischemia - NSTEMI ruled out. Followed serial troponins - stable. Cardiology consulted and appreciated. Echo w/ preserved EF 55-60%. Stress 1 Sept w/out ischemic changes. Heparin gtt d/c'd. Cardiology now signed off and agree     Acute Respiratory Failure - w/ hypoxia, POArrival - multifactorial 2nd to above.   Presence of clinical respiratory distress w/ tachypnea/dyspnea/SOB and wheezing w/ use of accessory muscles to breath - initially requiring BiPap - now off and resolved. Supplemental O2 and wean as tolerated. Pulmonology NOT YET consulted.     HTN/CAD - w/ known CAD s/p CABG but no evidence of active signs/sxs of ischemia/failure. Currently controlled on home meds      DM2 - diet controlled on no home oral antiGlycemics/Insulin. Followed FSBS/SSI medium regimen. Last HbA1c 5.2% dated this admission. Resumed home regimen at discharge.      GERD - w/out active signs/sxs of dysphagia/odynophagia. No evidence of active PUD or hx of GI bleed. Controlled on home PPI - continue.     HypoPhosphatemia - etiology clinically unable to determine. Followed serial labs and replaced PRN - ordered IV 3 Sept.       Anxiety - controlled on home meds - continued.        Labs: For convenience and continuity at follow-up the following most recent labs are provided:      CBC:    Lab Results   Component Value Date    WBC 5.1 09/03/2021    HGB 8.7 09/03/2021    HCT 27.0 09/03/2021     09/03/2021       Renal:    Lab Results   Component Value Date     09/03/2021    K 5.0 09/03/2021    K 3.1 09/01/2021     09/03/2021    CO2 29 09/03/2021    BUN 15 09/03/2021    CREATININE 0.9 09/03/2021    CALCIUM 9.0 09/03/2021    PHOS 2.1 09/03/2021         Significant Diagnostic Studies    Radiology:   NM Cardiac Stress Test Nuclear Imaging   Final Result      XR CHEST PORTABLE   Final Result   Diffuse bilateral airspace disease reflecting interstitial edema versus   pneumonia. Small bilateral pleural effusions. Consults:     PHARMACY TO DOSE VANCOMYCIN  PHARMACY TO DOSE VANCOMYCIN  IP CONSULT TO CARDIOLOGY  IP CONSULT TO DIETITIAN    Disposition: [facility]. Condition at Discharge: Stable    Discharge Instructions/Follow-up:  w/ PCP 1-2 weeks and subspecialists as arranged.      Code Status:  Full Code    Activity: activity as tolerated    Diet: regular diet      Discharge Medications: Discharge Medication List as of 9/3/2021  4:43 PM           Details   busPIRone (BUSPAR) 10 MG tablet Take 5 mg by mouth 3 times daily as needed Take 1 to 2 tablets 3 times daily PRNHistorical Med      pantoprazole (PROTONIX) 20 MG tablet Take 20 mg by mouth dailyHistorical Med      furosemide (LASIX) 20 MG tablet Take 20 mg by mouth daily mon wed friHistorical Med      ferrous sulfate (IRON 325) 325 (65 Fe) MG tablet Take 325 mg by mouth daily (with breakfast)Historical Med      potassium chloride (KLOR-CON) 20 MEQ packet Take 20 mEq by mouth dailyHistorical Med      Roflumilast (DALIRESP) 250 MCG tablet Take 250 mcg by mouth dailyHistorical Med      acetaminophen (TYLENOL) 500 MG tablet Take 500 mg by mouth every 6 hours as needed for PainHistorical Med      sertraline (ZOLOFT) 50 MG tablet Take 50 mg by mouth dailyHistorical Med      budesonide (PULMICORT) 0.5 MG/2ML nebulizer suspension Take 2 mLs by nebulization 2 times daily, Disp-60 ampule, R-3Normal      ipratropium-albuterol (DUONEB) 0.5-2.5 (3) MG/3ML SOLN nebulizer solution Inhale 3 mLs into the lungs every 6 hours, Disp-360 mL, R-0Normal      atorvastatin (LIPITOR) 20 MG tablet Take 1 tablet by mouth nightly, Disp-30 tablet, R-3Normal      metoprolol tartrate (LOPRESSOR) 25 MG tablet Take 1 tablet by mouth 2 times daily, Disp-60 tablet, R-3Normal      docusate sodium (COLACE, DULCOLAX) 100 MG CAPS Take 100 mg by mouth 2 times daily, Disp-30 capsule, R-0Normal      clopidogrel (PLAVIX) 75 MG tablet Take 1 tablet by mouth daily, Disp-30 tablet, R-3Print      tiotropium (SPIRIVA HANDIHALER) 18 MCG inhalation capsule Inhale 18 mcg into the lungs dailyHistorical Med      budesonide-formoterol (SYMBICORT) 160-4.5 MCG/ACT AERO Inhale 2 puffs into the lungs 2 times dailyHistorical Med      albuterol (PROVENTIL HFA) 108 (90 BASE) MCG/ACT inhaler Inhale 2 puffs into the lungs every 6 hours as needed for Wheezing or Shortness of Breath., Disp-1 Inhaler, R-2Print             Time Spent on discharge is more than 30 minutes in the examination, evaluation, counseling and review of medications and discharge plan. Signed:    Epi Arias MD   9/6/2021      Thank you Josue Rea MD for the opportunity to be involved in this patient's care. If you have any questions or concerns please feel free to contact me at [phone].

## 2021-09-09 ENCOUNTER — HOSPITAL ENCOUNTER (INPATIENT)
Age: 81
LOS: 5 days | Discharge: NURSING, REHAB OR LONG-TERM CARE | DRG: 189 | End: 2021-09-14
Attending: EMERGENCY MEDICINE | Admitting: STUDENT IN AN ORGANIZED HEALTH CARE EDUCATION/TRAINING PROGRAM
Payer: MEDICARE

## 2021-09-09 ENCOUNTER — APPOINTMENT (OUTPATIENT)
Dept: GENERAL RADIOLOGY | Age: 81
DRG: 189 | End: 2021-09-09
Payer: MEDICARE

## 2021-09-09 DIAGNOSIS — R77.8 ELEVATED TROPONIN: ICD-10-CM

## 2021-09-09 DIAGNOSIS — J18.9 HCAP (HEALTHCARE-ASSOCIATED PNEUMONIA): Primary | ICD-10-CM

## 2021-09-09 DIAGNOSIS — J96.01 ACUTE RESPIRATORY FAILURE WITH HYPOXIA (HCC): ICD-10-CM

## 2021-09-09 DIAGNOSIS — R79.89 ELEVATED BRAIN NATRIURETIC PEPTIDE (BNP) LEVEL: ICD-10-CM

## 2021-09-09 PROBLEM — I24.89 DEMAND ISCHEMIA: Status: ACTIVE | Noted: 2021-09-09

## 2021-09-09 PROBLEM — R06.03 ACUTE RESPIRATORY DISTRESS: Status: ACTIVE | Noted: 2021-09-09

## 2021-09-09 PROBLEM — I24.8 DEMAND ISCHEMIA (HCC): Status: ACTIVE | Noted: 2021-09-09

## 2021-09-09 PROBLEM — E87.70 VOLUME OVERLOAD: Status: ACTIVE | Noted: 2021-09-09

## 2021-09-09 PROBLEM — K21.9 GERD (GASTROESOPHAGEAL REFLUX DISEASE): Status: ACTIVE | Noted: 2021-09-09

## 2021-09-09 LAB
A/G RATIO: 0.9 (ref 1.1–2.2)
ALBUMIN SERPL-MCNC: 3.1 G/DL (ref 3.4–5)
ALP BLD-CCNC: 113 U/L (ref 40–129)
ALT SERPL-CCNC: 8 U/L (ref 10–40)
ANION GAP SERPL CALCULATED.3IONS-SCNC: 10 MMOL/L (ref 3–16)
ANION GAP SERPL CALCULATED.3IONS-SCNC: 8 MMOL/L (ref 3–16)
AST SERPL-CCNC: 18 U/L (ref 15–37)
BASE EXCESS VENOUS: 4.2 MMOL/L (ref -3–3)
BASOPHILS ABSOLUTE: 0 K/UL (ref 0–0.2)
BASOPHILS ABSOLUTE: 0.1 K/UL (ref 0–0.2)
BASOPHILS RELATIVE PERCENT: 0.3 %
BASOPHILS RELATIVE PERCENT: 0.6 %
BILIRUB SERPL-MCNC: 0.3 MG/DL (ref 0–1)
BILIRUBIN URINE: NEGATIVE
BLOOD, URINE: NEGATIVE
BUN BLDV-MCNC: 16 MG/DL (ref 7–20)
BUN BLDV-MCNC: 22 MG/DL (ref 7–20)
CALCIUM SERPL-MCNC: 9 MG/DL (ref 8.3–10.6)
CALCIUM SERPL-MCNC: 9.2 MG/DL (ref 8.3–10.6)
CARBOXYHEMOGLOBIN: 2.4 % (ref 0–1.5)
CHLORIDE BLD-SCNC: 97 MMOL/L (ref 99–110)
CHLORIDE BLD-SCNC: 98 MMOL/L (ref 99–110)
CLARITY: CLEAR
CO2: 29 MMOL/L (ref 21–32)
CO2: 30 MMOL/L (ref 21–32)
COLOR: YELLOW
CREAT SERPL-MCNC: 1.2 MG/DL (ref 0.6–1.2)
CREAT SERPL-MCNC: 1.6 MG/DL (ref 0.6–1.2)
D DIMER: 781 NG/ML DDU (ref 0–229)
EKG ATRIAL RATE: 118 BPM
EKG DIAGNOSIS: NORMAL
EKG P AXIS: 82 DEGREES
EKG P-R INTERVAL: 138 MS
EKG Q-T INTERVAL: 328 MS
EKG QRS DURATION: 82 MS
EKG QTC CALCULATION (BAZETT): 459 MS
EKG R AXIS: -60 DEGREES
EKG T AXIS: 81 DEGREES
EKG VENTRICULAR RATE: 118 BPM
EOSINOPHILS ABSOLUTE: 0 K/UL (ref 0–0.6)
EOSINOPHILS ABSOLUTE: 0 K/UL (ref 0–0.6)
EOSINOPHILS RELATIVE PERCENT: 0.1 %
EOSINOPHILS RELATIVE PERCENT: 0.2 %
GFR AFRICAN AMERICAN: 38
GFR AFRICAN AMERICAN: 52
GFR NON-AFRICAN AMERICAN: 31
GFR NON-AFRICAN AMERICAN: 43
GLOBULIN: 3.6 G/DL
GLUCOSE BLD-MCNC: 107 MG/DL (ref 70–99)
GLUCOSE BLD-MCNC: 117 MG/DL (ref 70–99)
GLUCOSE BLD-MCNC: 123 MG/DL (ref 70–99)
GLUCOSE BLD-MCNC: 131 MG/DL (ref 70–99)
GLUCOSE BLD-MCNC: 150 MG/DL (ref 70–99)
GLUCOSE URINE: NEGATIVE MG/DL
HCO3 VENOUS: 32 MMOL/L (ref 23–29)
HCT VFR BLD CALC: 27.8 % (ref 36–48)
HCT VFR BLD CALC: 30.4 % (ref 36–48)
HEMOGLOBIN: 9 G/DL (ref 12–16)
HEMOGLOBIN: 9.6 G/DL (ref 12–16)
KETONES, URINE: NEGATIVE MG/DL
LACTIC ACID, SEPSIS: 1.2 MMOL/L (ref 0.4–1.9)
LEUKOCYTE ESTERASE, URINE: NEGATIVE
LYMPHOCYTES ABSOLUTE: 0.4 K/UL (ref 1–5.1)
LYMPHOCYTES ABSOLUTE: 0.6 K/UL (ref 1–5.1)
LYMPHOCYTES RELATIVE PERCENT: 4 %
LYMPHOCYTES RELATIVE PERCENT: 5 %
MCH RBC QN AUTO: 28.6 PG (ref 26–34)
MCH RBC QN AUTO: 29.9 PG (ref 26–34)
MCHC RBC AUTO-ENTMCNC: 31.5 G/DL (ref 31–36)
MCHC RBC AUTO-ENTMCNC: 32.3 G/DL (ref 31–36)
MCV RBC AUTO: 90.7 FL (ref 80–100)
MCV RBC AUTO: 92.6 FL (ref 80–100)
METHEMOGLOBIN VENOUS: 0.5 %
MICROSCOPIC EXAMINATION: YES
MONOCYTES ABSOLUTE: 0.1 K/UL (ref 0–1.3)
MONOCYTES ABSOLUTE: 0.6 K/UL (ref 0–1.3)
MONOCYTES RELATIVE PERCENT: 1.3 %
MONOCYTES RELATIVE PERCENT: 4.4 %
NEUTROPHILS ABSOLUTE: 10.5 K/UL (ref 1.7–7.7)
NEUTROPHILS ABSOLUTE: 11.5 K/UL (ref 1.7–7.7)
NEUTROPHILS RELATIVE PERCENT: 90.1 %
NEUTROPHILS RELATIVE PERCENT: 94 %
NITRITE, URINE: NEGATIVE
O2 SAT, VEN: 93 %
O2 THERAPY: ABNORMAL
PCO2, VEN: 65.4 MMHG (ref 40–50)
PDW BLD-RTO: 18 % (ref 12.4–15.4)
PDW BLD-RTO: 18.6 % (ref 12.4–15.4)
PERFORMED ON: ABNORMAL
PH UA: 5.5 (ref 5–8)
PH VENOUS: 7.31 (ref 7.35–7.45)
PLATELET # BLD: 262 K/UL (ref 135–450)
PLATELET # BLD: 290 K/UL (ref 135–450)
PMV BLD AUTO: 7.1 FL (ref 5–10.5)
PMV BLD AUTO: 7.5 FL (ref 5–10.5)
PO2, VEN: 70 MMHG (ref 25–40)
POTASSIUM REFLEX MAGNESIUM: 5.1 MMOL/L (ref 3.5–5.1)
POTASSIUM SERPL-SCNC: 5.5 MMOL/L (ref 3.5–5.1)
POTASSIUM SERPL-SCNC: 5.6 MMOL/L (ref 3.5–5.1)
PRO-BNP: 4578 PG/ML (ref 0–449)
PROCALCITONIN: 0.42 NG/ML (ref 0–0.15)
PROTEIN UA: ABNORMAL MG/DL
RBC # BLD: 3 M/UL (ref 4–5.2)
RBC # BLD: 3.35 M/UL (ref 4–5.2)
RBC UA: NORMAL /HPF (ref 0–4)
SARS-COV-2, NAAT: NOT DETECTED
SARS-COV-2, NAAT: NOT DETECTED
SODIUM BLD-SCNC: 135 MMOL/L (ref 136–145)
SODIUM BLD-SCNC: 137 MMOL/L (ref 136–145)
SPECIFIC GRAVITY UA: 1.02 (ref 1–1.03)
TCO2 CALC VENOUS: 34 MMOL/L
TOTAL PROTEIN: 6.7 G/DL (ref 6.4–8.2)
TROPONIN: 0.06 NG/ML
TROPONIN: 0.07 NG/ML
TROPONIN: 0.09 NG/ML
URINE REFLEX TO CULTURE: ABNORMAL
URINE TYPE: ABNORMAL
UROBILINOGEN, URINE: 0.2 E.U./DL
WBC # BLD: 11.1 K/UL (ref 4–11)
WBC # BLD: 12.8 K/UL (ref 4–11)
WBC UA: NORMAL /HPF (ref 0–5)

## 2021-09-09 PROCEDURE — 85025 COMPLETE CBC W/AUTO DIFF WBC: CPT

## 2021-09-09 PROCEDURE — 94761 N-INVAS EAR/PLS OXIMETRY MLT: CPT

## 2021-09-09 PROCEDURE — 94660 CPAP INITIATION&MGMT: CPT

## 2021-09-09 PROCEDURE — 6360000002 HC RX W HCPCS: Performed by: INTERNAL MEDICINE

## 2021-09-09 PROCEDURE — 84132 ASSAY OF SERUM POTASSIUM: CPT

## 2021-09-09 PROCEDURE — 6370000000 HC RX 637 (ALT 250 FOR IP): Performed by: INTERNAL MEDICINE

## 2021-09-09 PROCEDURE — 87040 BLOOD CULTURE FOR BACTERIA: CPT

## 2021-09-09 PROCEDURE — 82803 BLOOD GASES ANY COMBINATION: CPT

## 2021-09-09 PROCEDURE — 2580000003 HC RX 258: Performed by: INTERNAL MEDICINE

## 2021-09-09 PROCEDURE — 80053 COMPREHEN METABOLIC PANEL: CPT

## 2021-09-09 PROCEDURE — 94640 AIRWAY INHALATION TREATMENT: CPT

## 2021-09-09 PROCEDURE — 93010 ELECTROCARDIOGRAM REPORT: CPT | Performed by: INTERNAL MEDICINE

## 2021-09-09 PROCEDURE — 83605 ASSAY OF LACTIC ACID: CPT

## 2021-09-09 PROCEDURE — 6360000002 HC RX W HCPCS: Performed by: STUDENT IN AN ORGANIZED HEALTH CARE EDUCATION/TRAINING PROGRAM

## 2021-09-09 PROCEDURE — 81001 URINALYSIS AUTO W/SCOPE: CPT

## 2021-09-09 PROCEDURE — 71045 X-RAY EXAM CHEST 1 VIEW: CPT

## 2021-09-09 PROCEDURE — 1200000000 HC SEMI PRIVATE

## 2021-09-09 PROCEDURE — 6370000000 HC RX 637 (ALT 250 FOR IP): Performed by: EMERGENCY MEDICINE

## 2021-09-09 PROCEDURE — 6360000002 HC RX W HCPCS: Performed by: EMERGENCY MEDICINE

## 2021-09-09 PROCEDURE — 96375 TX/PRO/DX INJ NEW DRUG ADDON: CPT

## 2021-09-09 PROCEDURE — 2580000003 HC RX 258: Performed by: EMERGENCY MEDICINE

## 2021-09-09 PROCEDURE — 83880 ASSAY OF NATRIURETIC PEPTIDE: CPT

## 2021-09-09 PROCEDURE — 87635 SARS-COV-2 COVID-19 AMP PRB: CPT

## 2021-09-09 PROCEDURE — 36415 COLL VENOUS BLD VENIPUNCTURE: CPT

## 2021-09-09 PROCEDURE — 85379 FIBRIN DEGRADATION QUANT: CPT

## 2021-09-09 PROCEDURE — 96374 THER/PROPH/DIAG INJ IV PUSH: CPT

## 2021-09-09 PROCEDURE — 84145 PROCALCITONIN (PCT): CPT

## 2021-09-09 PROCEDURE — 84484 ASSAY OF TROPONIN QUANT: CPT

## 2021-09-09 PROCEDURE — 93005 ELECTROCARDIOGRAM TRACING: CPT | Performed by: EMERGENCY MEDICINE

## 2021-09-09 PROCEDURE — 93005 ELECTROCARDIOGRAM TRACING: CPT | Performed by: STUDENT IN AN ORGANIZED HEALTH CARE EDUCATION/TRAINING PROGRAM

## 2021-09-09 PROCEDURE — 6370000000 HC RX 637 (ALT 250 FOR IP): Performed by: STUDENT IN AN ORGANIZED HEALTH CARE EDUCATION/TRAINING PROGRAM

## 2021-09-09 PROCEDURE — 99284 EMERGENCY DEPT VISIT MOD MDM: CPT

## 2021-09-09 PROCEDURE — 2700000000 HC OXYGEN THERAPY PER DAY

## 2021-09-09 RX ORDER — ONDANSETRON 4 MG/1
4 TABLET, ORALLY DISINTEGRATING ORAL EVERY 8 HOURS PRN
Status: DISCONTINUED | OUTPATIENT
Start: 2021-09-09 | End: 2021-09-15 | Stop reason: HOSPADM

## 2021-09-09 RX ORDER — ATORVASTATIN CALCIUM 10 MG/1
20 TABLET, FILM COATED ORAL NIGHTLY
Status: DISCONTINUED | OUTPATIENT
Start: 2021-09-09 | End: 2021-09-15 | Stop reason: HOSPADM

## 2021-09-09 RX ORDER — ASPIRIN 81 MG/1
324 TABLET, CHEWABLE ORAL ONCE
Status: COMPLETED | OUTPATIENT
Start: 2021-09-09 | End: 2021-09-09

## 2021-09-09 RX ORDER — SODIUM CHLORIDE 0.9 % (FLUSH) 0.9 %
10 SYRINGE (ML) INJECTION PRN
Status: DISCONTINUED | OUTPATIENT
Start: 2021-09-09 | End: 2021-09-15 | Stop reason: HOSPADM

## 2021-09-09 RX ORDER — SODIUM CHLORIDE 0.9 % (FLUSH) 0.9 %
10 SYRINGE (ML) INJECTION EVERY 12 HOURS SCHEDULED
Status: DISCONTINUED | OUTPATIENT
Start: 2021-09-09 | End: 2021-09-15 | Stop reason: HOSPADM

## 2021-09-09 RX ORDER — ALBUTEROL SULFATE 90 UG/1
2 AEROSOL, METERED RESPIRATORY (INHALATION) EVERY 6 HOURS PRN
Status: DISCONTINUED | OUTPATIENT
Start: 2021-09-09 | End: 2021-09-15 | Stop reason: HOSPADM

## 2021-09-09 RX ORDER — POLYETHYLENE GLYCOL 3350 17 G/17G
17 POWDER, FOR SOLUTION ORAL DAILY
Status: DISCONTINUED | OUTPATIENT
Start: 2021-09-09 | End: 2021-09-11

## 2021-09-09 RX ORDER — DOCUSATE SODIUM 100 MG/1
100 CAPSULE, LIQUID FILLED ORAL 2 TIMES DAILY
Status: DISCONTINUED | OUTPATIENT
Start: 2021-09-09 | End: 2021-09-09

## 2021-09-09 RX ORDER — DEXTROSE MONOHYDRATE 50 MG/ML
100 INJECTION, SOLUTION INTRAVENOUS PRN
Status: DISCONTINUED | OUTPATIENT
Start: 2021-09-09 | End: 2021-09-15 | Stop reason: HOSPADM

## 2021-09-09 RX ORDER — IPRATROPIUM BROMIDE AND ALBUTEROL SULFATE 2.5; .5 MG/3ML; MG/3ML
3 SOLUTION RESPIRATORY (INHALATION) EVERY 6 HOURS
Status: DISCONTINUED | OUTPATIENT
Start: 2021-09-09 | End: 2021-09-09

## 2021-09-09 RX ORDER — NICOTINE POLACRILEX 4 MG
15 LOZENGE BUCCAL PRN
Status: DISCONTINUED | OUTPATIENT
Start: 2021-09-09 | End: 2021-09-15 | Stop reason: HOSPADM

## 2021-09-09 RX ORDER — LORAZEPAM 2 MG/ML
0.5 INJECTION INTRAMUSCULAR ONCE
Status: DISCONTINUED | OUTPATIENT
Start: 2021-09-09 | End: 2021-09-09

## 2021-09-09 RX ORDER — FERROUS SULFATE 325(65) MG
325 TABLET ORAL
Status: DISCONTINUED | OUTPATIENT
Start: 2021-09-09 | End: 2021-09-15 | Stop reason: HOSPADM

## 2021-09-09 RX ORDER — ACETAMINOPHEN 325 MG/1
650 TABLET ORAL EVERY 6 HOURS PRN
Status: DISCONTINUED | OUTPATIENT
Start: 2021-09-09 | End: 2021-09-15 | Stop reason: HOSPADM

## 2021-09-09 RX ORDER — CLOPIDOGREL BISULFATE 75 MG/1
75 TABLET ORAL DAILY
Status: DISCONTINUED | OUTPATIENT
Start: 2021-09-09 | End: 2021-09-15 | Stop reason: HOSPADM

## 2021-09-09 RX ORDER — ONDANSETRON 2 MG/ML
4 INJECTION INTRAMUSCULAR; INTRAVENOUS EVERY 6 HOURS PRN
Status: DISCONTINUED | OUTPATIENT
Start: 2021-09-09 | End: 2021-09-15 | Stop reason: HOSPADM

## 2021-09-09 RX ORDER — ONDANSETRON 2 MG/ML
4 INJECTION INTRAMUSCULAR; INTRAVENOUS ONCE
Status: COMPLETED | OUTPATIENT
Start: 2021-09-09 | End: 2021-09-09

## 2021-09-09 RX ORDER — ACETAMINOPHEN 500 MG
500 TABLET ORAL EVERY 6 HOURS PRN
Status: DISCONTINUED | OUTPATIENT
Start: 2021-09-09 | End: 2021-09-09 | Stop reason: SDUPTHER

## 2021-09-09 RX ORDER — BUDESONIDE 0.5 MG/2ML
0.5 INHALANT ORAL 2 TIMES DAILY
Status: DISCONTINUED | OUTPATIENT
Start: 2021-09-09 | End: 2021-09-15 | Stop reason: HOSPADM

## 2021-09-09 RX ORDER — IPRATROPIUM BROMIDE AND ALBUTEROL SULFATE 2.5; .5 MG/3ML; MG/3ML
1 SOLUTION RESPIRATORY (INHALATION)
Status: DISCONTINUED | OUTPATIENT
Start: 2021-09-09 | End: 2021-09-15 | Stop reason: HOSPADM

## 2021-09-09 RX ORDER — LORAZEPAM 2 MG/ML
0.5 INJECTION INTRAMUSCULAR ONCE
Status: COMPLETED | OUTPATIENT
Start: 2021-09-09 | End: 2021-09-09

## 2021-09-09 RX ORDER — AZITHROMYCIN 250 MG/1
500 TABLET, FILM COATED ORAL DAILY
Status: COMPLETED | OUTPATIENT
Start: 2021-09-09 | End: 2021-09-11

## 2021-09-09 RX ORDER — PANTOPRAZOLE SODIUM 20 MG/1
20 TABLET, DELAYED RELEASE ORAL DAILY
Status: DISCONTINUED | OUTPATIENT
Start: 2021-09-09 | End: 2021-09-15 | Stop reason: HOSPADM

## 2021-09-09 RX ORDER — POLYETHYLENE GLYCOL 3350 17 G/17G
17 POWDER, FOR SOLUTION ORAL DAILY PRN
Status: DISCONTINUED | OUTPATIENT
Start: 2021-09-09 | End: 2021-09-09

## 2021-09-09 RX ORDER — ASPIRIN 81 MG/1
324 TABLET, CHEWABLE ORAL ONCE
Status: DISCONTINUED | OUTPATIENT
Start: 2021-09-09 | End: 2021-09-09

## 2021-09-09 RX ORDER — SODIUM CHLORIDE 9 MG/ML
25 INJECTION, SOLUTION INTRAVENOUS PRN
Status: DISCONTINUED | OUTPATIENT
Start: 2021-09-09 | End: 2021-09-15 | Stop reason: HOSPADM

## 2021-09-09 RX ORDER — FUROSEMIDE 10 MG/ML
20 INJECTION INTRAMUSCULAR; INTRAVENOUS ONCE
Status: COMPLETED | OUTPATIENT
Start: 2021-09-09 | End: 2021-09-09

## 2021-09-09 RX ORDER — ACETAMINOPHEN 500 MG
1000 TABLET ORAL ONCE
Status: DISCONTINUED | OUTPATIENT
Start: 2021-09-09 | End: 2021-09-09

## 2021-09-09 RX ORDER — ACETAMINOPHEN 650 MG/1
650 SUPPOSITORY RECTAL EVERY 6 HOURS PRN
Status: DISCONTINUED | OUTPATIENT
Start: 2021-09-09 | End: 2021-09-15 | Stop reason: HOSPADM

## 2021-09-09 RX ORDER — DEXTROSE MONOHYDRATE 25 G/50ML
12.5 INJECTION, SOLUTION INTRAVENOUS PRN
Status: DISCONTINUED | OUTPATIENT
Start: 2021-09-09 | End: 2021-09-15 | Stop reason: HOSPADM

## 2021-09-09 RX ORDER — ONDANSETRON 2 MG/ML
4 INJECTION INTRAMUSCULAR; INTRAVENOUS ONCE
Status: DISCONTINUED | OUTPATIENT
Start: 2021-09-09 | End: 2021-09-09

## 2021-09-09 RX ORDER — PREDNISONE 20 MG/1
40 TABLET ORAL DAILY
Status: COMPLETED | OUTPATIENT
Start: 2021-09-09 | End: 2021-09-13

## 2021-09-09 RX ADMIN — AZITHROMYCIN DIHYDRATE 500 MG: 250 TABLET, FILM COATED ORAL at 10:54

## 2021-09-09 RX ADMIN — SERTRALINE 50 MG: 50 TABLET, FILM COATED ORAL at 11:16

## 2021-09-09 RX ADMIN — POLYETHYLENE GLYCOL 3350 17 G: 17 POWDER, FOR SOLUTION ORAL at 10:53

## 2021-09-09 RX ADMIN — IPRATROPIUM BROMIDE AND ALBUTEROL SULFATE 1 AMPULE: .5; 3 SOLUTION RESPIRATORY (INHALATION) at 16:44

## 2021-09-09 RX ADMIN — ONDANSETRON 4 MG: 2 INJECTION INTRAMUSCULAR; INTRAVENOUS at 01:56

## 2021-09-09 RX ADMIN — ENOXAPARIN SODIUM 40 MG: 40 INJECTION SUBCUTANEOUS at 10:53

## 2021-09-09 RX ADMIN — PANTOPRAZOLE SODIUM 20 MG: 20 TABLET, DELAYED RELEASE ORAL at 10:53

## 2021-09-09 RX ADMIN — CLOPIDOGREL BISULFATE 75 MG: 75 TABLET ORAL at 10:54

## 2021-09-09 RX ADMIN — IPRATROPIUM BROMIDE AND ALBUTEROL SULFATE 1 AMPULE: .5; 3 SOLUTION RESPIRATORY (INHALATION) at 11:26

## 2021-09-09 RX ADMIN — VANCOMYCIN HYDROCHLORIDE 1000 MG: 1 INJECTION, POWDER, LYOPHILIZED, FOR SOLUTION INTRAVENOUS at 07:00

## 2021-09-09 RX ADMIN — SODIUM CHLORIDE, PRESERVATIVE FREE 10 ML: 5 INJECTION INTRAVENOUS at 10:55

## 2021-09-09 RX ADMIN — IPRATROPIUM BROMIDE AND ALBUTEROL SULFATE 1 AMPULE: .5; 3 SOLUTION RESPIRATORY (INHALATION) at 19:51

## 2021-09-09 RX ADMIN — LORAZEPAM 0.5 MG: 2 INJECTION INTRAMUSCULAR; INTRAVENOUS at 01:58

## 2021-09-09 RX ADMIN — METOPROLOL TARTRATE 25 MG: 25 TABLET, FILM COATED ORAL at 10:54

## 2021-09-09 RX ADMIN — FUROSEMIDE 20 MG: 10 INJECTION, SOLUTION INTRAMUSCULAR; INTRAVENOUS at 10:53

## 2021-09-09 RX ADMIN — ASPIRIN 324 MG: 81 TABLET, CHEWABLE ORAL at 05:46

## 2021-09-09 RX ADMIN — PIPERACILLIN SODIUM AND TAZOBACTAM SODIUM 4500 MG: 4; .5 INJECTION, POWDER, LYOPHILIZED, FOR SOLUTION INTRAVENOUS at 05:46

## 2021-09-09 RX ADMIN — BUDESONIDE 500 MCG: 0.5 SUSPENSION RESPIRATORY (INHALATION) at 11:27

## 2021-09-09 RX ADMIN — BUDESONIDE 500 MCG: 0.5 SUSPENSION RESPIRATORY (INHALATION) at 19:51

## 2021-09-09 RX ADMIN — PREDNISONE 40 MG: 20 TABLET ORAL at 10:54

## 2021-09-09 RX ADMIN — SODIUM CHLORIDE, PRESERVATIVE FREE 10 ML: 5 INJECTION INTRAVENOUS at 21:17

## 2021-09-09 RX ADMIN — ROFLUMILAST 250 MCG: 500 TABLET ORAL at 11:16

## 2021-09-09 RX ADMIN — FERROUS SULFATE TAB 325 MG (65 MG ELEMENTAL FE) 325 MG: 325 (65 FE) TAB at 10:54

## 2021-09-09 ASSESSMENT — PAIN SCALES - GENERAL
PAINLEVEL_OUTOF10: 0

## 2021-09-09 NOTE — H&P
Hospital Medicine History & Physical      PCP: Destiney Drummond MD    Date of Admission: 9/9/2021    Date of Service: Pt seen/examined on 09/09/2021 at 0900 and Admitted to Inpatient with expected LOS greater than two midnights due to medical therapy. Chief Complaint:  Sob, cough      History Of Present Illness:    [de-identified] y.o. female who presented to [facility] with 2 days of shortness of breath cough and increased sputum production. Pmhx: COPD on 2L NC, HTN, CAD s/p CABG, TIIDM (A1c 5.2%), GERD    Recent admission 09/03-09/06 for AHRF attributed to COPD exacerbation and PNA. Pt states that two days ago she developed acute shortness of breath with increased cough and yellow sputum production. Since then she has had difficulty breathing which is not her baseline. ROS positive for chills and consitpation otherwise negative for fevers, headache, muscle aches, chest pain, abdominal pain, diarrhea, dysuria, rash. She says she is able to lay down flat and occasionally has lower extremity swelling. In the ED the pt was noted to be tachypneic (35) afebrile and otherwise stable BP. Labs BMP stable, ProBNP 4500 (previous 3400), WBC 12.8. Rapid covid-19 negative. Pt was given vancomycin, pip-tazo d/t c/f HAP.     Past Medical History:          Diagnosis Date    [facility]     Asthma     COPD [facility]chronic obstructive pulmonary disease) [facility]     GERD (gastroesophageal reflux disease) 9/9/2021    Hyperlipidemia     Hypertension     MRSA (methicillin resistant staph aureus) culture positive 09/22/2019    + resp cx    OA (osteoarthritis) 8/12/2014       Past Surgical History:          Procedure Laterality Date    BRONCHOSCOPY  09/08/2019    Dr. Chastity Garcia - w/ALIYA   [address]  09/05/2019    Dr. Andre Brown 2/24/2020    COLONOSCOPY DIAGNOSTIC performed by Carlene Pepper DO at [address] [place] [address] 9/19/2019    OFF PUMP CORONARY ARTERY BYPASS GRAFTING X2, INTERNAL MAMMARY ARTERY, SAPHENOUS VEIN GRAFT performed by Nayeli Lindquist MD at [address] [facility] , PARTIAL Left     SIGMOIDOSCOPY  02/27/2020    4 bands    SIGMOIDOSCOPY N/A 2/27/2020    FLEX SIG W/ BANDING SIGMOIDOSCOPY DIAGNOSTIC FLEXIBLE performed by David Lorenzo DO at [address] [place] [address]       Medications Prior to Admission:      Prior to Admission medications    Medication Sig Start Date End Date Taking?  Authorizing Provider   busPIRone (BUSPAR) 10 MG tablet Take 5 mg by mouth 3 times daily as needed Take 1 to 2 tablets 3 times daily PRN    Historical Provider, MD   pantoprazole (PROTONIX) 20 MG tablet Take 20 mg by mouth daily    Historical Provider, MD   furosemide (LASIX) 20 MG tablet Take 20 mg by mouth daily mon wed fri    Historical Provider, MD   ferrous sulfate (IRON 325) 325 (65 Fe) MG tablet Take 325 mg by mouth daily (with breakfast)    Historical Provider, MD   potassium chloride (KLOR-CON) 20 MEQ packet Take 20 mEq by mouth daily    Historical Provider, MD   Roflumilast (DALIRESP) 250 MCG tablet Take 250 mcg by mouth daily    Historical Provider, MD   acetaminophen (TYLENOL) 500 MG tablet Take 500 mg by mouth every 6 hours as needed for Pain    Historical Provider, MD   sertraline (ZOLOFT) 50 MG tablet Take 50 mg by mouth daily    Historical Provider, MD   budesonide (PULMICORT) 0.5 MG/2ML nebulizer suspension Take 2 mLs by nebulization 2 times daily 9/24/19   Nayeli Lindquist MD   ipratropium-albuterol (DUONEB) 0.5-2.5 (3) MG/3ML SOLN nebulizer solution Inhale 3 mLs into the lungs every 6 hours 9/24/19   Yolanda Chase MD   atorvastatin (LIPITOR) 20 MG tablet Take 1 tablet by mouth nightly 9/24/19   Nayeli Lindquist MD   metoprolol tartrate (LOPRESSOR) 25 MG tablet Take 1 tablet by mouth 2 times daily 9/24/19   Nayeli Lindquist MD   docusate sodium (COLACE, DULCOLAX) 100 MG CAPS Take 100 mg by mouth 2 times daily 9/24/19   Yolanda DOBBINS MD Salvatore   clopidogrel (PLAVIX) 75 MG tablet Take 1 tablet by mouth daily 9/7/19   Deja Yee MD   tiotropium (SPIRIVA HANDIHALER) 18 MCG inhalation capsule Inhale 18 mcg into the lungs daily 7/25/16   Historical Provider, MD   budesonide-formoterol (SYMBICORT) 160-4.5 MCG/ACT AERO Inhale 2 puffs into the lungs 2 times daily    Historical Provider, MD   albuterol (PROVENTIL HFA) 108 (90 BASE) MCG/ACT inhaler Inhale 2 puffs into the lungs every 6 hours as needed for Wheezing or Shortness of Breath. 12/20/12   Shabana Moore MD       Allergies:  Latex and Codeine    Social History:      The patient currently lives [facility]:   reports that she quit smoking about 1 years ago. Her smoking use included cigarettes. She has a 25.00 pack-year smoking history. She has never used smokeless tobacco.  ETOH:   reports no history of alcohol use. E-Cigarettes/Vaping Use     Questions Responses    E-Cigarette/Vaping Use Never Assessed    Start Date     Passive Exposure     Quit Date     Counseling Given     Comments             Family History:      Reviewed in detail and negative for DM, CAD, Cancer, CVA. Positive as follows:        Problem Relation Age of Onset    Cancer Mother     Heart Disease Father     Stroke Father     Heart Disease Sister     Stroke Sister        REVIEW OF SYSTEMS COMPLETED:   Pertinent positives as noted in the HPI. All other systems reviewed and negative. PHYSICAL EXAM PERFORMED:    /66   Pulse 91   Temp 97.6 °F (36.4 °C) (Axillary)   Resp 26   SpO2 99%     General appearance: In respiratory distress with clavicular retractions  Respiratory:  Increased work of breathing, very course breath sound bilaterally though sound like they are coming mostly from upper airway, increased sputum on exam, pursed lips breathing with prolonged expiratory phase and wheezing. Breath sound heard bilaterally.   Cardiovascular:  Tachycardic, no murmurs, 2+ radial pulsus and warm, no clubbing  Abdomen: Soft, non-tender, non-distended with normal bowel sounds. Skin: diffuse LE excoriations. Neurologic: Nonfocal sensory/motor deficits. Cranial nerves: II-XII intact, grossly non-focal. Alert and oriented to situation  And self but not place or time  Capillary Refill: Brisk,3 seconds, normal        Labs:     Recent Labs     09/09/21 0145   WBC 12.8*   HGB 9.6*   HCT 30.4*        Recent Labs     09/09/21 0145      K 5.1   CL 98*   CO2 29   BUN 16   CREATININE 1.2   CALCIUM 9.2     Recent Labs     09/09/21 0145   AST 18   ALT 8*   BILITOT 0.3   ALKPHOS 113     No results for input(s): INR in the last 72 hours. Recent Labs     09/09/21 0145   TROPONINI 0.06*       Urinalysis:      Lab Results   Component Value Date    NITRU Negative 09/09/2021    WBCUA 0-2 09/09/2021    BACTERIA Rare 08/29/2021    RBCUA 0-2 09/09/2021    BLOODU Negative 09/09/2021    SPECGRAV 1.025 09/09/2021    GLUCOSEU Negative 09/09/2021       Radiology:     CXR: I have reviewed the CXR with the following interpretation: large lung field, flat diaphragms with bilateral central opacities (improved from previous cxr)  EKG:  I have reviewed the EKG with the following interpretation: Sinus tachycardia (100) with left axis deviation and non specific ST elevation in lead v1-v2 but no reciprocal changes noted. Perhaps LVH. XR CHEST PORTABLE   Final Result   Mild bibasilar heterogeneous opacities are improved from the prior study and   could relate to subsegmental atelectasis/scarring, mild residual pulmonary   edema or improving infectious/inflammatory process. No new consolidation. Follow-up PA and lateral chest radiographs in 6 weeks may be helpful for   further evaluation. Hyperexpanded lung volume can be seen in the setting of COPD.              ASSESSMENT:    Active Hospital Problems    Diagnosis Date Noted    Acute respiratory distress [R06.03] 09/09/2021    Volume overload [E87.70] 09/09/2021    Demand ischemia ([facility] [I24.8] 09/09/2021    GERD (gastroesophageal reflux disease) [K21.9] 09/09/2021    Essential hypertension [I10]     CAD (coronary artery disease) [I25.10] 09/08/2019    COPD exacerbation ([facility] [J44.1] 12/29/2017     [de-identified] yoF with history of tobacco use and severe COPD on 2L NC with recurrent exacerbations this year who presents with increased sob, cough and productive sputum with exam notable for respiratory distress likely from COPD exacerbation. Now on BIPAP. Etiology of exacerbation unclear perhaps viral or atypical PNA (chills and elevated procal?). CXR shows improvement from previous imaging modality and without lobar infiltrate. In the absence of clear PNA and rather absent systemic signs of infection will hold off on continuing abcx for HAP. Will start antimicrobials for COPD exacerbation with atypical coverage. Low threshold to cover for HAP if fevers or has Hemodynamic instability. Could also consider repeat COVID PCR if fevers as pt comes from high risk community though is vaccinated and not immunocompromised. Though troponin is slightly elevated I suspect this is type 2 demand ischemia from exacerbation in setting of CAD and not NSTEMI. Will repeat troponin and EKG. I suspect a slight component of pulmonary edema in the setting of course crackles, increased proximal infiltrates and increased pro-BNP. Will diuresis x1 and reassess for restarting home diuretic. PE should be considered (Wells score moderate, PERC positive)  risk factors include likely decreased mobility and acute onset of sxs, tachycardia. Follow up D Dimer. If Low (<800) will not pursue CTA. If elevated will need to order.     PLAN:    Acute Respiratory distress from COPD exacerbation  - Prednisone 40 (end date 09/13/2021)  - azithromycin 500mg (09/11/2021)  - furosemide 20mg IV x1  - Duonebs q4hr, acapella/air way clearence with RT, continue home nebulized ICS/spiriva/roflumilast  - start BIPAP, obtain VBG  - f/u D dimer    Type 2 Myocardial injury from demand ischemia in the setting of underlying CAD  - repeat troponin, EKG    CAD/HTN- continue home BB, Clopidogrel, atorvastatin    Mood disorder continue home sertraline    GERD- continue home PPI    Underlying dementiadelirium?- pt non focal exam but unware to location  - unable to reach [facility] for collateral information  - called family and spoke with daughter. Says pt does have decreased mobility from her previous Stroke. Quite bed bound. Has episodes of delirium when having COPD exacerbation but at baseline can be quite forgetful. Daughter confirmed pts code status and wishes. ACP note documented. DVT Prophylaxis: enoxparin  Diet: Diet NPO  Code Status: Full Code    PT/OT Eval Status: ordered    Dispo - pending clinical course 09/11       Medardo Merlos MD    Thank you Yosi Dean MD for the opportunity to be involved in this patient's care. If you have any questions or concerns please feel free to contact me at [phone].

## 2021-09-09 NOTE — ED PROVIDER NOTES
Inhale 3 mLs into the lungs every 6 hours 9/24/19   Yolanda Chase MD   atorvastatin (LIPITOR) 20 MG tablet Take 1 tablet by mouth nightly 9/24/19   Nirav Prakash MD   metoprolol tartrate (LOPRESSOR) 25 MG tablet Take 1 tablet by mouth 2 times daily 9/24/19   Yolanda Santana MD   docusate sodium (COLACE, DULCOLAX) 100 MG CAPS Take 100 mg by mouth 2 times daily 9/24/19   Yolanda Santana MD   clopidogrel (PLAVIX) 75 MG tablet Take 1 tablet by mouth daily 9/7/19   Brianna Lo MD   tiotropium ([place]) 18 MCG inhalation capsule Inhale 18 mcg into the lungs daily 7/25/16   Historical Provider, MD   budesonide-formoterol (SYMBICORT) 160-4.5 MCG/ACT AERO Inhale 2 puffs into the lungs 2 times daily    Historical Provider, MD   albuterol (PROVENTIL HFA) 108 (90 BASE) MCG/ACT inhaler Inhale 2 puffs into the lungs every 6 hours as needed for Wheezing or Shortness of Breath.  12/20/12   Darryl Carpenter MD       Allergies as of 09/09/2021 - Fully Reviewed 09/09/2021   Allergen Reaction Noted    Latex  02/27/2020    Codeine  12/20/2012       Past Medical History:   Diagnosis Date    [facility]) [facility]     Asthma     COPD (chronic obstructive [facility] disease) [facility]     Hyperlipidemia     Hypertension     MRSA (methicillin resistant staph aureus) culture positive 09/22/2019    + resp cx    OA (osteoarthritis) 8/12/2014        Surgical History:   Past Surgical History:   Procedure Laterality Date    BRONCHOSCOPY  09/08/2019    Dr. Job Palafox - w/BAL   [address]  09/05/2019    Dr. Karen Oviedo 2/24/2020    COLONOSCOPY DIAGNOSTIC performed by Fátima Ramsay DO at [address] N/A 9/19/2019    OFF PUMP CORONARY ARTERY BYPASS GRAFTING X2, INTERNAL MAMMARY ARTERY, SAPHENOUS VEIN GRAFT performed by Nirav Prakash MD at [address], PARTIAL Left     SIGMOIDOSCOPY  02/27/2020    4 bands    SIGMOIDOSCOPY N/A 2020    FLEX SIG W/ BANDING SIGMOIDOSCOPY DIAGNOSTIC FLEXIBLE performed by Breanna Bernard DO at [facility] ENDOSCOPY        Family History:    Family History   Problem Relation Age of Onset    Cancer Mother     Heart Disease Father     Stroke Father     Heart Disease Sister     Stroke Sister        Social History     Socioeconomic History    Marital status:      Spouse name: Not on file    Number of children: Not on file    Years of education: Not on file    Highest education level: Not on file   Occupational History    Not on file   Tobacco Use    Smoking status: Former Smoker     Packs/day: 0.50     Years: 50.00     Pack years: 25.00     Types: Cigarettes     Quit date: 2019     Years since quittin.9    Smokeless tobacco: Never Used    Tobacco comment: advised to quit   Vaping Use    Vaping Use: Never assessed   Substance and Sexual Activity    Alcohol use: No    Drug use: No    Sexual activity: Not Currently     Comment: elio Montague Day   Other Topics Concern    Not on file   Social History Narrative    Not on file     Social Determinants of Health     Financial Resource Strain:     Difficulty of Paying Living Expenses:    Food Insecurity:     Worried About Running Out of Food in the Last Year:     [address] [place] [address] N in the Last Year:    Transportation Needs:     Lack of Transportation (Medical):      Lack of Transportation (Non-Medical):    Physical Activity:     Days of Exercise per Week:     Minutes of Exercise per Session:    Stress:     Feeling of Stress :    Social Connections:     Frequency of Communication with Friends and Family:     Frequency of Social Gatherings with Friends and Family:     Attends Anabaptism Services:     Active Member of Clubs or Organizations:     Attends Club or Organization Meetings:     Marital Status:    Intimate Partner Violence:     Fear of Current or Ex-Partner:     Emotionally Abused:     Physically Abused:     Sexually Abused:        Nursing notes reviewed. ED Triage Vitals   Enc Vitals Group      BP 09/09/21 0104 138/69      Pulse 09/09/21 0102 77      Resp 09/09/21 0102 16      Temp 09/09/21 0102 100 °F (37.8 °C)      Temp Source 09/09/21 0102 Oral      SpO2 09/09/21 0102 100 %      Weight --       Height --       Head Circumference --       Peak Flow --       Pain Score --       Pain Loc --       Pain Edu? --       Excl. in GC? --        GENERAL:  Awake, alert. Well developed, well nourished with mild respiratory distress. HENT:  Normocephalic, Atraumatic, moist mucous membranes. EYES:  Pupils equal round and reactive to light, Conjunctiva normal, extraocular movements normal.  NECK:  No meningeal signs, Supple. CHEST: Tachycardic, chest wall non-tender. LUNGS: Scattered rhonchi and occasional rales bilaterally, tachypneic. ABDOMEN:  Soft, non-tender, no rebound, rigidity or guarding, non-distended, normal bowel sounds. No costovertebral angle tenderness to palpation. BACK:  No tenderness. EXTREMITIES:  Normal range of motion, bilateral lower extremity edema, no bony tenderness, no deformity, distal pulses present. SKIN: Warm, dry and intact. NEUROLOGIC: Oriented to person but not place or time. Moving all extremities to command. LABS and DIAGNOSTIC RESULTS  EKG  The Ekg interpreted by me shows  sinus tachycardia, tlyk=003   Axis is   Left axis deviation  QTc is  normal  Frequent PACs  ST Segments: no acute change  No significant change from prior EKG dated 8/30/21    RADIOLOGY  X-RAYS:  I have reviewed radiologic plain film image(s). ALL OTHER NON-PLAIN FILM IMAGES SUCH AS CT, ULTRASOUND AND MRI HAVE BEEN READ BY THE RADIOLOGIST. XR CHEST PORTABLE   Final Result   Mild bibasilar heterogeneous opacities are improved from the prior study and   could relate to subsegmental atelectasis/scarring, mild residual pulmonary   edema or improving infectious/inflammatory process.   No new consolidation. Follow-up PA and lateral chest radiographs in 6 weeks may be helpful for   further evaluation. Hyperexpanded lung volume can be seen in the setting of COPD.               LABS  Labs Reviewed   CBC WITH AUTO DIFFERENTIAL - Abnormal; Notable for the following components:       Result Value    WBC 12.8 (*)     RBC 3.35 (*)     Hemoglobin 9.6 (*)     Hematocrit 30.4 (*)     RDW 18.6 (*)     Neutrophils Absolute 11.5 (*)     Lymphocytes Absolute 0.6 (*)     All other components within normal limits    Narrative:     Performed at:  [place] [address] [employer]   Phone [phone]   COMPREHENSIVE METABOLIC PANEL W/ REFLEX TO MG FOR LOW K - Abnormal; Notable for the following components:    Chloride 98 (*)     Glucose 150 (*)     GFR Non- 43 (*)     GFR  52 (*)     Albumin 3.1 (*)     Albumin/Globulin Ratio 0.9 (*)     ALT 8 (*)     All other components within normal limits    Narrative:     Performed at:  [place] [address] [employer]   Phone [phone]   TROPONIN - Abnormal; Notable for the following components:    Troponin 0.06 (*)     All other components within normal limits    Narrative:     Performed at:  [place] [address] [employer]   Phone [phone] - Abnormal; Notable for the following components:    Pro-BNP 4,578 (*)     All other components within normal limits    Narrative:     Performed at:  [address], [facility] [employer]   Phone [phone]   PROCALCITONIN - Abnormal; Notable for the following components:    Procalcitonin 0.42 (*)     All other components within normal limits    Narrative:     Performed at:  [address], [facility] [employer]   Phone [phone]   URINE RT REFLEX TO CULTURE - Abnormal; Notable for the following components:    Protein, UA TRACE (*)     All other components within normal limits    Narrative:     Performed at:  [address], [facility] [employer]   Phone [phone], RAPID    Narrative:     Performed at:  [facility]) [place] [address] [employer]   Phone [phone]   CULTURE, BLOOD 1   CULTURE, BLOOD 2   LACTATE, SEPSIS    Narrative:     Performed at:  [place] [address] [employer]   Phone [phone]   MICROSCOPIC URINALYSIS    Narrative:     Performed at:  [address], [facility] [employer]   Phone [phone]        Patient stabilized from respiratory standpoint with supplemental oxygen. I am concerned that she has developed a healthcare associated pneumonia now and for this reason I am treating her as such. Her white blood cell count is more than doubled since discharge and she has an elevated temperature as well. For this reason I provided her with broad-spectrum IV antibiotics. It appears that her troponin is roughly stable from the last admission and during the last admission cardiology gave the opinion that this is not related to myocardial ischemia. The total Critical Care time is 40 minutes which excludes separately billable procedures. The critical care was concerning treatment for respiratory distress and healthcare associated pneumonia with supplemental oxygen and IV antibiotics. .  This time is exclusive of any time documented by any other providers. I spoke with Dr. Tyesha Toribio. We thoroughly discussed the history, physical exam, laboratory and imaging studies, as well as, emergency department course.  Based upon that discussion, we've decided to admit Gabi Jackson Luis for further observation and evaluation of Tiffanie Smith's dyspnea. As I have deemed necessary from their history, physical, and studies, I have considered and evaluated Camille Garcia for the following diagnoses:  ACUTE CORONARY SYNDROME, CHRONIC OBSTRUCTIVE PULMONARY DISEASE, CONGESTIVE HEART FAILURE, PERICARDIAL TAMPONADE, PNEUMONIA, PNEUMOTHORAX, PULMONARY EMBOLISM, SEPSIS, and THORACIC DISSECTION. FINAL IMPRESSION  1. HCAP (healthcare-associated pneumonia)    2. Acute respiratory failure with hypoxia (HCC)    3. Elevated troponin    4. Elevated brain natriuretic peptide (BNP) level        Vitals:  Blood pressure 136/66, pulse 110, temperature 99 °F (37.2 °C), temperature source Oral, resp. rate (!) 35, SpO2 100 %, not currently breastfeeding. Disposition  Pt is in stable condition upon Admit to telemetry. This chart was generated using the [address] [place] [address] dictation system. I created this record but it may contain dictation errors.          Cindy Roy MD  09/09/21 1080

## 2021-09-09 NOTE — DISCHARGE INSTR - COC
Continuity of Care Form    Patient Name: Caesar Drivers   :    MRN:  [id number]    Admit date:  2021  Discharge date:      Code Status Order: Full Code   Advance Directives:      Admitting Physician:  Odette Julian MD  PCP: Fito Taveras MD    Discharging Nurse: Ronnie maher  [facility] Unit/Room#: 9424/1996-41  Discharging Unit Phone Number: [phone]    Emergency Contact:   Extended Emergency Contact Information  Primary Emergency Contact: [address] of [address] [place] [address] Phone: [phone]  Relation: Child  Secondary Emergency Contact: [address] [facility] Phone: [phone]  Relation: Other    Past Surgical History:  Past Surgical History:   Procedure Laterality Date    BRONCHOSCOPY  2019    Dr. Alpesh Mcdaniel - w/BAL   [address]  2019    Dr. Christian Ivy N/A 2020    COLONOSCOPY DIAGNOSTIC performed by Grazyna Ramirez DO at [address] [place] N/A 2019    OFF PUMP CORONARY ARTERY BYPASS GRAFTING X2, INTERNAL MAMMARY ARTERY, SAPHENOUS VEIN GRAFT performed by Rekha Gallego MD at [address] [facility] , PARTIAL Left     SIGMOIDOSCOPY  2020    4 bands    SIGMOIDOSCOPY N/A 2020    FLEX SIG W/ BANDING SIGMOIDOSCOPY DIAGNOSTIC FLEXIBLE performed by Grazyna Ramirez DO at [address]       Immunization History:   Immunization History   Administered Date(s) Administered    COVID-19, Pfizer, PF, 30mcg/0.3mL 2021, 2021    Influenza Virus Vaccine 10/17/2013    Influenza, [place], 6 mo and older, IM, PF (Flulaval, Fluarix) 2019    Influenza, Quadv, IM, PF (6 mo and older Fluzone, Flulaval, Fluarix, and 3 yrs and older Afluria) 10/04/2019    Pneumococcal Conjugate 13-valent (Yffodsv20) 2017    Pneumococcal Polysaccharide ([id number]) 2015    Tdap (Boostrix, Adacel) 2018       Active Problems:  Patient Active Problem List   Diagnosis Code    Hyperlipidemia E78.5    Asthma J45.909    OA (osteoarthritis) M19.90    Tobacco use Z72.0    COPD exacerbation ([facility] J44.1    Sepsis [facility] A41.9    Abnormal chest x-ray R93.89    COPD with acute exacerbation ([facility] J44.1    Shortness of breath R06.02    Tobacco abuse Z72.0    Moderate malnutrition ([facility]) E44.0    NSTEMI (non-ST elevated myocardial infarction) ([facility] I21.4    Acute respiratory failure with hypoxia ([facility] J96.01    CAD (coronary artery disease) I25.10    Acute pulmonary edema ([facility]) J81.0    Pulmonary infiltrate R91.8    Elevated brain natriuretic peptide (BNP) level R79.89    Leukocytosis D72.829    Ischemic cardiomyopathy I25.5    Acute combined systolic and diastolic congestive heart failure ([facility]) I50.41    Hypernatremia E87.0    NADJA (acute kidney injury) ([facility] N17.9    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00    Acute respiratory distress R06.03    Volume overload E87.70    Demand ischemia ([facility]) I24.8    GERD (gastroesophageal reflux disease) K21.9       Isolation/Infection:   Isolation            No Isolation          Patient Infection Status       Infection Onset Added Last Indicated Last Indicated By Review Planned Expiration Resolved Resolved By    COVID-19 Rule Out 09/09/21 09/09/21 09/09/21 COVID-19 (Ordered) 09/16/21 09/23/21      Resolved    COVID-19 Rule Out 09/09/21 09/09/21 09/09/21 COVID-19, Rapid (Ordered)   09/09/21 Rule-Out Test Resulted    COVID-19 Rule Out 09/09/21 09/09/21 09/09/21 SARS-CoV-2 NAAT (Rapid) (Ordered)   09/09/21 Rule-Out Test Resulted    COVID-19 Rule Out 08/30/21 08/30/21 08/30/21 COVID-19 (Ordered)   08/30/21 Rule-Out Test Resulted    COVID-19 Rule Out 08/29/21 08/29/21 08/29/21 COVID-19, Rapid (Ordered)   08/29/21 Rule-Out Test Resulted    COVID-19 Rule Out 06/01/21 06/01/21 06/01/21 COVID-19 & Influenza Combo (Ordered)   06/01/21 Rule-Out Test Resulted    MRSA 09/22/19 09/25/19 09/22/19 Respiratory Culture   06/01/21 Wilfredo Byers RN            Nurse Assessment:  Last Vital Signs: BP (!) 103/53   Pulse 71   Temp 98.4 °F (36.9 °C) (Oral)   Resp 26   Ht 5' 2\" (1.575 m)   Wt 108 lb 0.4 oz (49 kg)   SpO2 99%   BMI 19.76 kg/m²     Last documented pain score (0-10 scale): Pain Level: 0  Last Weight:   Wt Readings from Last 1 Encounters:   09/09/21 108 lb 0.4 oz (49 kg)     Mental Status:  oriented    IV Access:  - None    Nursing Mobility/ADLs:  Walking   Assisted  Transfer  Assisted  Bathing  Assisted  Dressing  Assisted  Toileting  Assisted  Feeding  Independent  Med Admin  Independent  Med Delivery   whole and prefers mixed with applesauce    Wound Care Documentation and Therapy:        Elimination:  Continence:   · Bowel: Yes  · Bladder: Yes  Urinary Catheter: Removal Date 9/14/21   Colostomy/Ileostomy/Ileal Conduit: No       Date of Last BM: 9/14    Intake/Output Summary (Last 24 hours) at 9/9/2021 1550  Last data filed at 9/9/2021 0849  Gross per 24 hour   Intake 350 ml   Output    Net 350 ml     I/O last 3 completed shifts: In: 350 [IV Piggyback:350]  Out: -     Safety Concerns:      At Risk for Falls    Impairments/Disabilities:      None    Nutrition Therapy:  Current Nutrition Therapy:   - Oral Diet:  Dental Soft    Routes of Feeding: Oral  Liquids: Nectar Thick Liquids  Daily Fluid Restriction: no  Last Modified Barium Swallow with Video (Video Swallowing Test): done on 9/13/2021    Treatments at the Time of Hospital Discharge: Respiratory Treatments: yes  Oxygen Therapy:  is on oxygen at 2 L/min per nasal cannula. Ventilator:    - BiPAP   IPAP: 12 cmH20, CPAP/EPAP: 6 cmH2O only when sleeping    Rehab Therapies: Physical Therapy and Occupational Therapy  Weight Bearing Status/Restrictions: No weight bearing restirctions  Other Medical Equipment (for information only, NOT a DME order):  walker  Other Treatments: none    Patient's personal belongings (please select all that are sent with patient):  None    RN SIGNATURE:  Electronically signed by Amy Levy RN on 9/14/21 at 5:52 PM EDT    CASE MANAGEMENT/SOCIAL WORK SECTION    Inpatient Status Date: ***    Readmission Risk Assessment Score:  Readmission Risk              Risk of Unplanned Readmission:  23           Discharging to Facility/ Agency     [facility]   [phone]       / signature: Electronically signed by Marita Harmon RN on 9/14/21 at 3:37 PM EDT    PHYSICIAN SECTION    Prognosis: Fair    Condition at Discharge: Stable    Rehab Potential (if transferring to Rehab): Fair    Recommended Follow-up, Labs or Other Treatments After Discharge:      Please do overnight nocturnal pulse ox on home baseline 2L oxygen NC, send to results to [facility] pulmonary(Dr. Karen Torres)           Physician Certification: I certify the above information and transfer of Ashly Garcia  is necessary for the continuing treatment of the diagnosis listed and that she requires [facility] for less 30 days.      Update Admission H&P: No change in H&P    PHYSICIAN SIGNATURE:  Electronically signed by Arlie Hashimoto, MD on 9/14/21 at 4:24 PM EDT

## 2021-09-09 NOTE — PROGRESS NOTES
09/09/21 1100   RT Protocol   Smoking Status 2   Surgical status 0   Xray 3   Respiratory pattern 1   Mental Status 0   Breath sounds 1   Cough 3   Activity level 2   Oxygen Requirement 1   Indications for Bronchodilator Therapy Decreased or absent breath sounds   Bronchodilator Assessment Score 10   Indications for Bronchial Hygiene Weak or non-productive cough   Bronchial Hygiene Assessment Score 11   Indications for Volume Expansion None   Volume Expansion Assessment Score 7

## 2021-09-09 NOTE — PROGRESS NOTES
09/09/21 1225   NIV Type   Skin Protection for O2 Device Yes   Location Nose   NIV Started/Stopped On   Equipment Type BIPAP   Mode Bilevel   Mask Type Full face mask   Mask Size Small   Settings/Measurements   IPAP 12 cmH20   CPAP/EPAP 6 cmH2O   Resp (!) 31   FiO2  40 %   Vt Exhaled 297 mL   Minute Volume 9.7 Liters   Mask Leak (lpm) 0 lpm   Comfort Level Good   Breath Sounds   Right Upper Lobe Expiratory Wheezes; Diminished   Right Middle Lobe Diminished   Right Lower Lobe Expiratory Wheezes; Diminished   Left Upper Lobe Diminished   Left Lower Lobe Diminished   Patient Observation   Observations MEPILEX ON NOSE   Alarm Settings   Alarms On Y   Press Low Alarm 6 cmH2O   High Pressure Alarm 30 cmH2O   Delay Alarm 20 sec(s)   Apnea (secs) 20 secs   Resp Rate Low Alarm 6   High Respiratory Rate 45 br/min   Oxygen Therapy/Pulse Ox   O2 Therapy Oxygen   O2 Device PAP (positive airway pressure)      UPON EXITING ROOM

## 2021-09-09 NOTE — PROGRESS NOTES
Speech Language Pathology      Name: Walker Fothergill  : 1940  Medical Diagnosis: Elevated troponin [R77.8]  Elevated brain natriuretic peptide (BNP) level [R79.89]  Acute respiratory failure with hypoxia (HCC) [J96.01]  PNA (pneumonia) [J18.9]  HCAP (healthcare-associated pneumonia) [J18.9]    Swallow screen completed. Patient demonstrates some risk indicators for potential dysphagia / aspiration per swallow screen. RECOMMEND: Clinical Swallow Evaluation at bedside to assess swallowing function, rule out aspiration, and determine appropriate diet level. Please place order for SLP Eval and Treat if in agreement. Luly Negron, [address] [id number]  Speech-Language Pathologist

## 2021-09-09 NOTE — ED NOTES
Bed: 16  Expected date:   Expected time:   Means of arrival:   Comments:  [address], RN  09/09/21 5132

## 2021-09-09 NOTE — PROGRESS NOTES
09/09/21 1019   NIV Type   $NIV $Daily Charge   Skin Assessment Clean, dry, & intact   Skin Protection for O2 Device Yes   Location Nose   NIV Started/Stopped On   Equipment Type BIPAP   Mode Bilevel   Mask Type Full face mask   Mask Size Small   Settings/Measurements   IPAP 12 cmH20   CPAP/EPAP 6 cmH2O   Resp (!) 34   FiO2  40 %   Vt Exhaled 304 mL   Minute Volume 10.9 Liters   Mask Leak (lpm) 0 lpm   Comfort Level Good   Using Accessory Muscles No   SpO2 100   Breath Sounds   Right Upper Lobe Diminished   Right Middle Lobe Diminished   Right Lower Lobe Diminished   Left Upper Lobe Diminished   Left Lower Lobe Diminished   Patient Observation   Observations MEPILEX ON NOSE, RECEIVED PT FROM ER   Alarm Settings   Alarms On Y   Press Low Alarm 6 cmH2O   High Pressure Alarm 30 cmH2O   Delay Alarm 20 sec(s)   Apnea (secs) 20 secs   Resp Rate Low Alarm 6   High Respiratory Rate 45 br/min   Oxygen Therapy/Pulse Ox   O2 Therapy Oxygen   O2 Device PAP (positive airway pressure)

## 2021-09-09 NOTE — ACP (ADVANCE CARE PLANNING)
ADVANCED CARE PLANNING - REMOTE     Name:Terri Wang       :  1940              MRN:  [id number]  Admission Date: 2021  1:05 AM  Date of Discussion:  2021 at 1030      Purpose of Encounter: Advanced care planning in light of acute respiratory distress. Parties in attendance: :Kelly Manzano MD, Family members: Daughter Nancy Chappell Capacity:Yes    Remote ACP visit was performed based on new provisions and guidance offered by [facility] on 2020 in setting of COVID 19 outbreak and in order to preserve personal protective equipment in accordance with the flexibilities announced by CMS on 2020. References:   https://med. [place].[facility]/Portals/0/Resources/COVID-19/3_18%20Telemed%20Guidance%20Updated%20March%2018. pdf?[email or web address]   http://[employer]/. pdf     Objective/Medical Story: [de-identified] yoF with history of tobacco use and severe COPD on 2L NC with recurrent exacerbations this year who presents with increased sob, cough and productive sputum with exam notable for respiratory distress likely from COPD exacerbation. Now on BIPAP. Kesha Fernandez Active Diagnoses:     Active Hospital Problems    Diagnosis Date Noted    Acute respiratory distress [R06.03] 2021    Volume overload [E87.70] 2021    Demand ischemia (HCC) [I24.8] 2021    GERD (gastroesophageal reflux disease) [K21.9] 2021    Essential hypertension [I10]     CAD (coronary artery disease) [I25.10] 2019    COPD exacerbation (HCC) [J44.1] 2017       These active diagnoses are of sufficient risk that focused discussion on advance care planning is indicated in order to allow the patient to thoughtfully consider personal goals of care; and if situations arise that prevent the ability to personally give input, to ensure appropriate representation of their personal desires for different levels and agressiveness of care. Goals of Care Determinations: Patient wishes to focus on Full code and continue all medical measures   Code Status: At this time patient wishes to be Full Code    Time Spent:     Total time spent face-to-face in education and discussion:  minutes. Electronically signed by Chloe Hamman, MD on 9/9/2021 at 10:29 AM    Thank you Bessy Rodriguez MD for the opportunity to be involved in this patient's care. If you have any questions or concerns please feel free to contact me at [phone].

## 2021-09-10 ENCOUNTER — APPOINTMENT (OUTPATIENT)
Dept: GENERAL RADIOLOGY | Age: 81
DRG: 189 | End: 2021-09-10
Payer: MEDICARE

## 2021-09-10 ENCOUNTER — APPOINTMENT (OUTPATIENT)
Dept: NUCLEAR MEDICINE | Age: 81
DRG: 189 | End: 2021-09-10
Payer: MEDICARE

## 2021-09-10 ENCOUNTER — APPOINTMENT (OUTPATIENT)
Dept: VASCULAR LAB | Age: 81
DRG: 189 | End: 2021-09-10
Payer: MEDICARE

## 2021-09-10 LAB
ANION GAP SERPL CALCULATED.3IONS-SCNC: 10 MMOL/L (ref 3–16)
ANION GAP SERPL CALCULATED.3IONS-SCNC: 9 MMOL/L (ref 3–16)
BASE EXCESS VENOUS: 0.3 MMOL/L (ref -3–3)
BASE EXCESS VENOUS: 3.6 MMOL/L (ref -3–3)
BASOPHILS ABSOLUTE: 0 K/UL (ref 0–0.2)
BASOPHILS RELATIVE PERCENT: 0 %
BUN BLDV-MCNC: 32 MG/DL (ref 7–20)
BUN BLDV-MCNC: 36 MG/DL (ref 7–20)
CALCIUM SERPL-MCNC: 9 MG/DL (ref 8.3–10.6)
CALCIUM SERPL-MCNC: 9.3 MG/DL (ref 8.3–10.6)
CARBOXYHEMOGLOBIN: 1.7 % (ref 0–1.5)
CARBOXYHEMOGLOBIN: 2.5 % (ref 0–1.5)
CHLORIDE BLD-SCNC: 97 MMOL/L (ref 99–110)
CHLORIDE BLD-SCNC: 98 MMOL/L (ref 99–110)
CO2: 29 MMOL/L (ref 21–32)
CO2: 31 MMOL/L (ref 21–32)
CREAT SERPL-MCNC: 2 MG/DL (ref 0.6–1.2)
CREAT SERPL-MCNC: 2.2 MG/DL (ref 0.6–1.2)
EKG ATRIAL RATE: 85 BPM
EKG DIAGNOSIS: NORMAL
EKG P AXIS: 73 DEGREES
EKG P-R INTERVAL: 134 MS
EKG Q-T INTERVAL: 376 MS
EKG QRS DURATION: 86 MS
EKG QTC CALCULATION (BAZETT): 447 MS
EKG R AXIS: -50 DEGREES
EKG T AXIS: 52 DEGREES
EKG VENTRICULAR RATE: 85 BPM
EOSINOPHILS ABSOLUTE: 0 K/UL (ref 0–0.6)
EOSINOPHILS RELATIVE PERCENT: 0 %
GFR AFRICAN AMERICAN: 26
GFR AFRICAN AMERICAN: 29
GFR NON-AFRICAN AMERICAN: 21
GFR NON-AFRICAN AMERICAN: 24
GLUCOSE BLD-MCNC: 101 MG/DL (ref 70–99)
GLUCOSE BLD-MCNC: 109 MG/DL (ref 70–99)
GLUCOSE BLD-MCNC: 122 MG/DL (ref 70–99)
GLUCOSE BLD-MCNC: 125 MG/DL (ref 70–99)
GLUCOSE BLD-MCNC: 131 MG/DL (ref 70–99)
GLUCOSE BLD-MCNC: 135 MG/DL (ref 70–99)
HCO3 VENOUS: 28.3 MMOL/L (ref 23–29)
HCO3 VENOUS: 30.9 MMOL/L (ref 23–29)
HCT VFR BLD CALC: 27.9 % (ref 36–48)
HEMOGLOBIN: 9 G/DL (ref 12–16)
LYMPHOCYTES ABSOLUTE: 1.3 K/UL (ref 1–5.1)
LYMPHOCYTES RELATIVE PERCENT: 12.1 %
MCH RBC QN AUTO: 29.9 PG (ref 26–34)
MCHC RBC AUTO-ENTMCNC: 32.2 G/DL (ref 31–36)
MCV RBC AUTO: 93.1 FL (ref 80–100)
METHEMOGLOBIN VENOUS: 0.3 %
METHEMOGLOBIN VENOUS: 0.5 %
MONOCYTES ABSOLUTE: 0.4 K/UL (ref 0–1.3)
MONOCYTES RELATIVE PERCENT: 3.8 %
NEUTROPHILS ABSOLUTE: 8.7 K/UL (ref 1.7–7.7)
NEUTROPHILS RELATIVE PERCENT: 84.1 %
O2 SAT, VEN: 91 %
O2 SAT, VEN: 98 %
O2 THERAPY: ABNORMAL
O2 THERAPY: ABNORMAL
OSMOLALITY URINE: 360 MOSM/KG (ref 390–1070)
OSMOLALITY: 316 MOSM/KG (ref 280–301)
PCO2, VEN: 64.2 MMHG (ref 40–50)
PCO2, VEN: 64.3 MMHG (ref 40–50)
PDW BLD-RTO: 18.1 % (ref 12.4–15.4)
PERFORMED ON: ABNORMAL
PH VENOUS: 7.26 (ref 7.35–7.45)
PH VENOUS: 7.3 (ref 7.35–7.45)
PLATELET # BLD: 340 K/UL (ref 135–450)
PMV BLD AUTO: 7.2 FL (ref 5–10.5)
PO2, VEN: 120.7 MMHG (ref 25–40)
PO2, VEN: 65.8 MMHG (ref 25–40)
POTASSIUM REFLEX MAGNESIUM: 5.5 MMOL/L (ref 3.5–5.1)
POTASSIUM SERPL-SCNC: 5.4 MMOL/L (ref 3.5–5.1)
RBC # BLD: 2.99 M/UL (ref 4–5.2)
SODIUM BLD-SCNC: 135 MMOL/L (ref 136–145)
SODIUM BLD-SCNC: 139 MMOL/L (ref 136–145)
SODIUM URINE: <20 MMOL/L
TCO2 CALC VENOUS: 30 MMOL/L
TCO2 CALC VENOUS: 33 MMOL/L
UREA NITROGEN, UR: 291.3 MG/DL (ref 800–1666)
WBC # BLD: 10.3 K/UL (ref 4–11)

## 2021-09-10 PROCEDURE — 71045 X-RAY EXAM CHEST 1 VIEW: CPT

## 2021-09-10 PROCEDURE — 1200000000 HC SEMI PRIVATE

## 2021-09-10 PROCEDURE — 83935 ASSAY OF URINE OSMOLALITY: CPT

## 2021-09-10 PROCEDURE — 85025 COMPLETE CBC W/AUTO DIFF WBC: CPT

## 2021-09-10 PROCEDURE — 2580000003 HC RX 258: Performed by: STUDENT IN AN ORGANIZED HEALTH CARE EDUCATION/TRAINING PROGRAM

## 2021-09-10 PROCEDURE — A9558 XE133 XENON 10MCI: HCPCS | Performed by: STUDENT IN AN ORGANIZED HEALTH CARE EDUCATION/TRAINING PROGRAM

## 2021-09-10 PROCEDURE — 83930 ASSAY OF BLOOD OSMOLALITY: CPT

## 2021-09-10 PROCEDURE — 82803 BLOOD GASES ANY COMBINATION: CPT

## 2021-09-10 PROCEDURE — 6370000000 HC RX 637 (ALT 250 FOR IP): Performed by: INTERNAL MEDICINE

## 2021-09-10 PROCEDURE — 6360000002 HC RX W HCPCS: Performed by: STUDENT IN AN ORGANIZED HEALTH CARE EDUCATION/TRAINING PROGRAM

## 2021-09-10 PROCEDURE — 6370000000 HC RX 637 (ALT 250 FOR IP): Performed by: STUDENT IN AN ORGANIZED HEALTH CARE EDUCATION/TRAINING PROGRAM

## 2021-09-10 PROCEDURE — 92610 EVALUATE SWALLOWING FUNCTION: CPT

## 2021-09-10 PROCEDURE — 3430000000 HC RX DIAGNOSTIC RADIOPHARMACEUTICAL: Performed by: STUDENT IN AN ORGANIZED HEALTH CARE EDUCATION/TRAINING PROGRAM

## 2021-09-10 PROCEDURE — 6360000002 HC RX W HCPCS: Performed by: INTERNAL MEDICINE

## 2021-09-10 PROCEDURE — 80048 BASIC METABOLIC PNL TOTAL CA: CPT

## 2021-09-10 PROCEDURE — 94761 N-INVAS EAR/PLS OXIMETRY MLT: CPT

## 2021-09-10 PROCEDURE — 94640 AIRWAY INHALATION TREATMENT: CPT

## 2021-09-10 PROCEDURE — 93010 ELECTROCARDIOGRAM REPORT: CPT | Performed by: INTERNAL MEDICINE

## 2021-09-10 PROCEDURE — 94660 CPAP INITIATION&MGMT: CPT

## 2021-09-10 PROCEDURE — 87641 MR-STAPH DNA AMP PROBE: CPT

## 2021-09-10 PROCEDURE — 36415 COLL VENOUS BLD VENIPUNCTURE: CPT

## 2021-09-10 PROCEDURE — 84540 ASSAY OF URINE/UREA-N: CPT

## 2021-09-10 PROCEDURE — 2700000000 HC OXYGEN THERAPY PER DAY

## 2021-09-10 PROCEDURE — 93970 EXTREMITY STUDY: CPT

## 2021-09-10 PROCEDURE — 78582 LUNG VENTILAT&PERFUS IMAGING: CPT

## 2021-09-10 PROCEDURE — A9540 TC99M MAA: HCPCS | Performed by: STUDENT IN AN ORGANIZED HEALTH CARE EDUCATION/TRAINING PROGRAM

## 2021-09-10 PROCEDURE — 84300 ASSAY OF URINE SODIUM: CPT

## 2021-09-10 PROCEDURE — 2580000003 HC RX 258: Performed by: INTERNAL MEDICINE

## 2021-09-10 RX ORDER — 0.9 % SODIUM CHLORIDE 0.9 %
500 INTRAVENOUS SOLUTION INTRAVENOUS ONCE
Status: DISCONTINUED | OUTPATIENT
Start: 2021-09-10 | End: 2021-09-11

## 2021-09-10 RX ORDER — SODIUM CHLORIDE, SODIUM LACTATE, POTASSIUM CHLORIDE, CALCIUM CHLORIDE 600; 310; 30; 20 MG/100ML; MG/100ML; MG/100ML; MG/100ML
INJECTION, SOLUTION INTRAVENOUS CONTINUOUS
Status: ACTIVE | OUTPATIENT
Start: 2021-09-10 | End: 2021-09-10

## 2021-09-10 RX ORDER — HEPARIN SODIUM 5000 [USP'U]/ML
5000 INJECTION, SOLUTION INTRAVENOUS; SUBCUTANEOUS EVERY 8 HOURS SCHEDULED
Status: DISCONTINUED | OUTPATIENT
Start: 2021-09-11 | End: 2021-09-15 | Stop reason: HOSPADM

## 2021-09-10 RX ORDER — HYDROXYZINE HYDROCHLORIDE 10 MG/1
10 TABLET, FILM COATED ORAL ONCE
Status: COMPLETED | OUTPATIENT
Start: 2021-09-11 | End: 2021-09-11

## 2021-09-10 RX ORDER — SODIUM CHLORIDE, SODIUM LACTATE, POTASSIUM CHLORIDE, AND CALCIUM CHLORIDE .6; .31; .03; .02 G/100ML; G/100ML; G/100ML; G/100ML
800 INJECTION, SOLUTION INTRAVENOUS ONCE
Status: DISCONTINUED | OUTPATIENT
Start: 2021-09-10 | End: 2021-09-11

## 2021-09-10 RX ORDER — MORPHINE SULFATE 2 MG/ML
0.5 INJECTION, SOLUTION INTRAMUSCULAR; INTRAVENOUS
Status: COMPLETED | OUTPATIENT
Start: 2021-09-10 | End: 2021-09-10

## 2021-09-10 RX ORDER — FLUTICASONE PROPIONATE 50 MCG
1 SPRAY, SUSPENSION (ML) NASAL DAILY
Status: DISCONTINUED | OUTPATIENT
Start: 2021-09-10 | End: 2021-09-15 | Stop reason: HOSPADM

## 2021-09-10 RX ORDER — 0.9 % SODIUM CHLORIDE 0.9 %
500 INTRAVENOUS SOLUTION INTRAVENOUS ONCE
Status: COMPLETED | OUTPATIENT
Start: 2021-09-11 | End: 2021-09-11

## 2021-09-10 RX ORDER — MORPHINE SULFATE 2 MG/ML
0.5 INJECTION, SOLUTION INTRAMUSCULAR; INTRAVENOUS EVERY 4 HOURS PRN
Status: DISCONTINUED | OUTPATIENT
Start: 2021-09-10 | End: 2021-09-15 | Stop reason: HOSPADM

## 2021-09-10 RX ORDER — HEPARIN SODIUM 5000 [USP'U]/ML
5000 INJECTION, SOLUTION INTRAVENOUS; SUBCUTANEOUS EVERY 8 HOURS SCHEDULED
Status: DISCONTINUED | OUTPATIENT
Start: 2021-09-10 | End: 2021-09-10

## 2021-09-10 RX ORDER — MORPHINE SULFATE 2 MG/ML
INJECTION, SOLUTION INTRAMUSCULAR; INTRAVENOUS
Status: DISCONTINUED
Start: 2021-09-10 | End: 2021-09-10

## 2021-09-10 RX ORDER — BUSPIRONE HYDROCHLORIDE 5 MG/1
5 TABLET ORAL 3 TIMES DAILY
Status: DISCONTINUED | OUTPATIENT
Start: 2021-09-10 | End: 2021-09-15 | Stop reason: HOSPADM

## 2021-09-10 RX ORDER — XENON XE-133 10 MCI/1
16.4 GAS RESPIRATORY (INHALATION)
Status: COMPLETED | OUTPATIENT
Start: 2021-09-10 | End: 2021-09-10

## 2021-09-10 RX ADMIN — SODIUM CHLORIDE, POTASSIUM CHLORIDE, SODIUM LACTATE AND CALCIUM CHLORIDE: 600; 310; 30; 20 INJECTION, SOLUTION INTRAVENOUS at 17:40

## 2021-09-10 RX ADMIN — MORPHINE SULFATE 0.5 MG: 2 INJECTION, SOLUTION INTRAMUSCULAR; INTRAVENOUS at 08:32

## 2021-09-10 RX ADMIN — Medication 5.4 MILLICURIE: at 13:20

## 2021-09-10 RX ADMIN — BUDESONIDE 500 MCG: 0.5 SUSPENSION RESPIRATORY (INHALATION) at 20:44

## 2021-09-10 RX ADMIN — BUDESONIDE 500 MCG: 0.5 SUSPENSION RESPIRATORY (INHALATION) at 09:07

## 2021-09-10 RX ADMIN — SERTRALINE 50 MG: 50 TABLET, FILM COATED ORAL at 07:35

## 2021-09-10 RX ADMIN — IPRATROPIUM BROMIDE AND ALBUTEROL SULFATE 1 AMPULE: .5; 3 SOLUTION RESPIRATORY (INHALATION) at 20:44

## 2021-09-10 RX ADMIN — XENON XE-133 16.4 MILLICURIE: 10 GAS RESPIRATORY (INHALATION) at 13:20

## 2021-09-10 RX ADMIN — FERROUS SULFATE TAB 325 MG (65 MG ELEMENTAL FE) 325 MG: 325 (65 FE) TAB at 07:35

## 2021-09-10 RX ADMIN — FLUTICASONE PROPIONATE 1 SPRAY: 50 SPRAY, METERED NASAL at 20:13

## 2021-09-10 RX ADMIN — SODIUM CHLORIDE, PRESERVATIVE FREE 10 ML: 5 INJECTION INTRAVENOUS at 20:14

## 2021-09-10 RX ADMIN — CLOPIDOGREL BISULFATE 75 MG: 75 TABLET ORAL at 07:34

## 2021-09-10 RX ADMIN — IPRATROPIUM BROMIDE AND ALBUTEROL SULFATE 1 AMPULE: .5; 3 SOLUTION RESPIRATORY (INHALATION) at 12:00

## 2021-09-10 RX ADMIN — ATORVASTATIN CALCIUM 20 MG: 10 TABLET, FILM COATED ORAL at 20:13

## 2021-09-10 RX ADMIN — IPRATROPIUM BROMIDE AND ALBUTEROL SULFATE 1 AMPULE: .5; 3 SOLUTION RESPIRATORY (INHALATION) at 09:00

## 2021-09-10 RX ADMIN — IPRATROPIUM BROMIDE AND ALBUTEROL SULFATE 1 AMPULE: .5; 3 SOLUTION RESPIRATORY (INHALATION) at 16:09

## 2021-09-10 RX ADMIN — METOPROLOL TARTRATE 25 MG: 25 TABLET, FILM COATED ORAL at 07:34

## 2021-09-10 RX ADMIN — PANTOPRAZOLE SODIUM 20 MG: 20 TABLET, DELAYED RELEASE ORAL at 07:34

## 2021-09-10 RX ADMIN — SODIUM CHLORIDE, POTASSIUM CHLORIDE, SODIUM LACTATE AND CALCIUM CHLORIDE: 600; 310; 30; 20 INJECTION, SOLUTION INTRAVENOUS at 10:37

## 2021-09-10 RX ADMIN — SODIUM CHLORIDE, PRESERVATIVE FREE 10 ML: 5 INJECTION INTRAVENOUS at 10:28

## 2021-09-10 RX ADMIN — ENOXAPARIN SODIUM 40 MG: 40 INJECTION SUBCUTANEOUS at 07:35

## 2021-09-10 RX ADMIN — BUSPIRONE HYDROCHLORIDE 5 MG: 5 TABLET ORAL at 20:13

## 2021-09-10 RX ADMIN — PREDNISONE 40 MG: 20 TABLET ORAL at 07:34

## 2021-09-10 RX ADMIN — BUSPIRONE HYDROCHLORIDE 5 MG: 5 TABLET ORAL at 10:34

## 2021-09-10 RX ADMIN — AZITHROMYCIN DIHYDRATE 500 MG: 250 TABLET, FILM COATED ORAL at 07:34

## 2021-09-10 RX ADMIN — POLYETHYLENE GLYCOL 3350 17 G: 17 POWDER, FOR SOLUTION ORAL at 07:35

## 2021-09-10 RX ADMIN — ROFLUMILAST 250 MCG: 500 TABLET ORAL at 07:38

## 2021-09-10 ASSESSMENT — PAIN SCALES - GENERAL
PAINLEVEL_OUTOF10: 0
PAINLEVEL_OUTOF10: 3
PAINLEVEL_OUTOF10: 0

## 2021-09-10 NOTE — PLAN OF CARE
Problem: Nutrition  Intervention: Swallowing evaluation  Note: Bedside swallow evaluation completed. Kiki Stearns M.A. CCC-SLP  Speech Language Pathologist         Problem: Nutrition  Intervention: Aspiration precautions  Note: Bedside swallow evaluation completed. Kiki Stearns M.A.  Sandi Curling  Speech Language Pathologist

## 2021-09-10 NOTE — PROGRESS NOTES
09/10/21 0023   NIV Type   $NIV $Daily Charge   Skin Protection for O2 Device Yes   Location Nose   NIV Started/Stopped On   Equipment Type V60   Mode Bilevel   Mask Type Full face mask   Mask Size Small   Settings/Measurements   IPAP 12 cmH20   CPAP/EPAP 6 cmH2O   Rate Ordered 12   Resp 23   FiO2  40 %   Vt Exhaled 301 mL   Minute Volume 6.6 Liters   Mask Leak (lpm) 0 lpm   Comfort Level Good   Using Accessory Muscles No   SpO2 98   Alarm Settings   Alarms On Y   Press Low Alarm 6 cmH2O   High Pressure Alarm 30 cmH2O   Delay Alarm 20 sec(s)   Resp Rate Low Alarm 6   High Respiratory Rate 40 br/min

## 2021-09-10 NOTE — PROGRESS NOTES
09/10/21 0403   NIV Type   Skin Protection for O2 Device Yes   Location Nose   NIV Started/Stopped On   Equipment Type V60   Mode Bilevel   Mask Type Full face mask   Mask Size Small   Settings/Measurements   IPAP 12 cmH20   CPAP/EPAP 6 cmH2O   Rate Ordered 12   Resp 14   FiO2  40 %   Vt Exhaled 650 mL   Minute Volume 8.3 Liters   Mask Leak (lpm) 11 lpm   Comfort Level Good   Using Accessory Muscles No   SpO2 96   Alarm Settings   Alarms On Y

## 2021-09-10 NOTE — PROGRESS NOTES
Hospitalist Progress Note      PCP: Enma Palm MD    Date of Admission: 9/9/2021    Chief Complaint:sob, cough     Hospital Course: [de-identified] yoF with history of tobacco use and severe COPD on 2L NC with recurrent exacerbations this year who presents with increased sob, cough and productive sputum with exam notable for respiratory distress likely from COPD exacerbation. Improving with BIPAP    Subjective: Pt feels much better this morning. Able to speak in complete sentences and answer questions appropriately. Cough is improving and attempting to cough items up.        Medications:  Reviewed    Infusion Medications    lactated ringers      sodium chloride      dextrose       Scheduled Medications    busPIRone  5 mg Oral TID    [START ON 9/11/2021] heparin (porcine)  5,000 Units SubCUTAneous 3 times per day    atorvastatin  20 mg Oral Nightly    budesonide  0.5 mg Nebulization BID    clopidogrel  75 mg Oral Daily    ferrous sulfate  325 mg Oral Daily with breakfast    metoprolol tartrate  25 mg Oral BID    pantoprazole  20 mg Oral Daily    Roflumilast  250 mcg Oral Daily    sertraline  50 mg Oral Daily    tiotropium  2 puff Inhalation Daily    sodium chloride flush  10 mL IntraVENous 2 times per day    ipratropium-albuterol  1 ampule Inhalation Q4H WA    insulin lispro  0-6 Units SubCUTAneous TID WC    insulin lispro  0-3 Units SubCUTAneous Nightly    azithromycin  500 mg Oral Daily    predniSONE  40 mg Oral Daily    polyethylene glycol  17 g Oral Daily     PRN Meds: morphine, albuterol sulfate HFA, sodium chloride flush, sodium chloride, ondansetron **OR** ondansetron, acetaminophen **OR** acetaminophen, glucose, dextrose, glucagon (rDNA), dextrose      Intake/Output Summary (Last 24 hours) at 9/10/2021 0951  Last data filed at 9/10/2021 0555  Gross per 24 hour   Intake 0 ml   Output 200 ml   Net -200 ml       Physical Exam Performed:    /73   Pulse 108   Temp 98.2 °F (36.8 °C) (Oral)   Resp 28   Ht 5' 2\" (1.575 m)   Wt 99 lb 10.4 oz (45.2 kg)   SpO2 100%   BMI 18.23 kg/m²     General appearance: No apparent distress, appears stated age and cooperative. Respiratory:  Normal respiratory effort though with pursed lip breathing (pt says its her baseline) Clear to auscultation, bilaterally with increased crackles at the bases. Cardiovascular: Regular rate and rhythm with normal S1/S2 without murmurs, rubs or gallops. Abdomen: Soft, non-tender, non-distended with normal bowel sounds. Musculoskeletal: No clubbing, cyanosis or edema bilaterally. Full range of motion without deformity. Skin: Skin color, texture, turgor normal.  No rashes or lesions. Neurologic:  Neurovascularly intact without any focal sensory/motor deficits. Cranial nerves: II-XII intact, grossly non-focal.  Psychiatric: Alert and oriented, thought content appropriate, normal insight  Capillary Refill: Brisk,3 seconds, normal   Peripheral Pulses: +2 palpable, equal bilaterally       Labs:   Recent Labs     09/09/21  0145 09/09/21  1601 09/10/21  0549   WBC 12.8* 11.1* 10.3   HGB 9.6* 9.0* 9.0*   HCT 30.4* 27.8* 27.9*    262 340     Recent Labs     09/09/21  0145 09/09/21  1602 09/09/21  2017 09/10/21  0549    135*  --  139   K 5.1 5.6* 5.5* 5.5*   CL 98* 97*  --  98*   CO2 29 30  --  31   BUN 16 22*  --  32*   CREATININE 1.2 1.6*  --  2.0*   CALCIUM 9.2 9.0  --  9.3     Recent Labs     09/09/21 0145   AST 18   ALT 8*   BILITOT 0.3   ALKPHOS 113     No results for input(s): INR in the last 72 hours.   Recent Labs     09/09/21 0145 09/09/21 1602 09/09/21 2017   TROPONINI 0.06* 0.09* 0.07*       Urinalysis:      Lab Results   Component Value Date    NITRU Negative 09/09/2021    WBCUA 0-2 09/09/2021    BACTERIA Rare 08/29/2021    RBCUA 0-2 09/09/2021    BLOODU Negative 09/09/2021    SPECGRAV 1.025 09/09/2021    GLUCOSEU Negative 09/09/2021       Radiology:  XR CHEST PORTABLE   Final Result   Right basilar atelectasis, scarring or airspace disease. XR CHEST PORTABLE   Final Result   Mild bibasilar heterogeneous opacities are improved from the prior study and   could relate to subsegmental atelectasis/scarring, mild residual pulmonary   edema or improving infectious/inflammatory process. No new consolidation. Follow-up PA and lateral chest radiographs in 6 weeks may be helpful for   further evaluation. Hyperexpanded lung volume can be seen in the setting of COPD.         VL Extremity Venous Bilateral    (Results Pending)   NM LUNG VENT/PERFUSION (VQ)    (Results Pending)           Assessment/Plan:    Active Hospital Problems    Diagnosis     Acute respiratory distress [R06.03]     Volume overload [E87.70]     Demand ischemia (HCC) [I24.8]     GERD (gastroesophageal reflux disease) [K21.9]     Essential hypertension [I10]     CAD (coronary artery disease) [I25.10]     COPD exacerbation (HCC) [J44.1]      Acute Respiratory distress from COPD exacerbation. Precipitant unclear. D dimer elevated but under the age adjusted cut off (800). Will obtain lower extremity dopplers and V/Q scan. Suspicion for PE remains moderate. This could explain her elevated troponin and pro-BNP as she is not grossly volume overloaded. No systemic anticoagulation started due to history of GI bleed. Echo 08/29 nrl EF with elevated diastolic filling pressures. - Prednisone 40 (end date 09/13/2021)  - azithromycin 500mg (09/11/2021)  - Duonebs q4hr, acapella/air way clearence with RT, continue home nebulized ICS/spiriva/roflumilast  - BIPAP nightly and PRN daily, obtain VBG  - f/u V/Q scan, LE dopplers  - continue home buspar and start PRN morphine d/t end stage COPD     Type 2 Myocardial injury from demand ischemia in the setting of underlying CAD  - resolved. Acute renal failure- Creatinine 2.0. was 1.2 on admission. Baseline ~1.0. Suspect prerenal in setting of 24 hours without PO intake, s/p lasix.  Will obtain urine lytes, encourage PO intake and trial IV fluids as needed. - urine osm, Na, urea, creatinine, serum osm  - encourage PO intake  - Gentle hydration 75 cc/hr x 12 hours  - repeat BMP to check Cr and K at 1600     CAD/HTN- continue home BB, Clopidogrel, atorvastatin     Mood disorder continue home sertraline     GERD- continue home PPI     Underlying dementiadelirium?- pt non focal exam but unware to location  - unable to reach [facility] for collateral information  - called family and spoke with daughter. Says pt does have decreased mobility from her previous Stroke. Quite bed bound. Has episodes of delirium when having COPD exacerbation but at baseline can be quite forgetful. Daughter confirmed pts code status and wishes. ACP note documented.       GOC-readdressed today. No change in medical mgmt. DVT Prophylaxis: heparin TID d/t renal function  Diet: ADULT DIET;  Regular  Code Status: Full Code    PT/OT Eval Status: ordered    Dispo - pending clinical course    Kala Zaidi MD

## 2021-09-10 NOTE — CARE COORDINATION
Placed call to [facility] with [facility] to follow up on referral made yesterday. She states they are able to accept at discharge pending therapy rec's. Per RN, patient is NOT medically appropriate for therapy at this time as she is being ruled out for blood clots (PE). Elena Owusu with therapy present at time of conversation and states they will continue to follow patient in order to assess when patient will be medically appropriate to work with therapy. Patient will not need a precert. Will need a rapid covid on day of discharge.

## 2021-09-10 NOTE — PROGRESS NOTES
Speech Language Pathology  Facility/Department: [facility] C4 PCU   CLINICAL BEDSIDE SWALLOW EVALUATION    Recommended Diet and Intervention  Diet Solids Recommendation: Regular  Liquid Consistency Recommendation: Thin  Recommended Form of Meds: PO    NAME: Baljit Batista  : 1940  MRN: [id number]    ADMISSION DATE: 2021  ADMITTING DIAGNOSIS: has Hyperlipidemia; Asthma; OA (osteoarthritis); Tobacco use; COPD exacerbation ([address] Sepsis ([address] Abnormal chest x-ray; COPD with acute exacerbation ([address] Shortness of breath; Tobacco abuse; Moderate malnutrition ([address] NSTEMI (non-ST elevated myocardial infarction) ([address] Acute respiratory failure with hypoxia ([address] CAD (coronary artery disease); Acute pulmonary edema ([address] Pulmonary infiltrate; Elevated brain natriuretic peptide (BNP) level; Leukocytosis; Ischemic cardiomyopathy; Acute combined systolic and diastolic congestive heart failure ([address] Hypernatremia; NADJA (acute kidney injury) ([address] Status post aorto-coronary artery bypass graft; Mucus plugging of bronchi; CAD in native artery; S/P CABG (coronary artery bypass graft); Pneumonia of both lower lobes due to methicillin resistant Staphylococcus aureus (MRSA) ([address] GI bleed; Severe malnutrition ([address] Chest pain; Chronic respiratory failure with hypoxia ([address] Essential hypertension; Anemia; Acute respiratory failure ([address] Acute respiratory distress; Volume overload; Demand ischemia ([address] and GERD (gastroesophageal reflux disease) on their problem list.  ONSET DATE: 21    Recent Chest Xray/CT of Chest: (9/10/21)  Impression   Right basilar atelectasis, scarring or airspace disease.      Date of Eval: 9/10/2021  Evaluating Therapist: VISHNU Zelaya    Current Diet level:  Current Diet : Regular  Current Liquid Diet : Thin    Primary Complaint  Patient Complaint: Per MD H&P: \"\" [de-identified] y.o. female who presented to [facility] with 2 days of shortness of breath cough and increased sputum assessment  Dysphagia Outcome Severity Scale: Level 6: Within functional limits/Modified independence     Treatment Plan  Requires SLP Intervention: Yes  Duration/Frequency of Treatment: 1-2x/week  D/C Recommendations: To be determined     Recommended Diet and Intervention  Diet Solids Recommendation: Regular  Liquid Consistency Recommendation: Thin  Recommended Form of Meds: PO     Compensatory Swallowing Strategies  Compensatory Swallowing Strategies: Eat/Feed slowly;Upright as possible for all oral intake;Remain upright for 30-45 minutes after meals;Small bites/sips; External pacing    Treatment/Goals  Short-term Goals  Timeframe for Short-term Goals: 5 days (9/15/21)  Long-term Goals  Timeframe for Long-term Goals: 7 days (9/17/21)  Goal 1: Pt will tolerate recommended diet w/o exhibiting overt s/s dysphagia. Dysphagia Goals: The patient will tolerate recommended diet without observed clinical signs of aspiration; The patient/caregiver will demonstrate understanding of compensatory strategies for improved swallowing safety. General  Chart Reviewed: Yes  Subjective  Subjective: RN okayed SLP entry  Behavior/Cognition: Alert; Cooperative  Respiratory Status: O2 via nasual cannula  Breath Sounds:  (congestion)  O2 Device: Nasal cannula  Liters of Oxygen: 4 L  Communication Observation: Functional  Follows Directions: Simple  Dentition: Dentures top;Dentures bottom (top dentures not in)  Patient Positioning: Upright in bed  Baseline Vocal Quality: Normal  Volitional Cough: Strong  Consistencies Administered: Reg solid; Thin - cup; Thin - straw    Vision/Hearing  Vision  Vision: Within Functional Limits  Hearing  Hearing: Within functional limits    Oral Motor Deficits  Oral/Motor  Oral Motor: Exceptions to [facility]  Labial Strength: Reduced  Lingual Strength: Reduced    Oral Phase Dysfunction  Oral Phase  Oral Phase: WFL     Indicators of Pharyngeal Phase Dysfunction   Pharyngeal Phase  Pharyngeal Phase: Exceptions  Indicators of Pharyngeal Phase Dysfunction  Throat Clearing - Delayed: Thin - straw;Reg Solid    Prognosis  Prognosis  Prognosis for safe diet advancement: fair  Barriers to reach goals: age  Individuals consulted  Consulted and agree with results and recommendations: Patient; Family member  Family member consulted: granddaughter    Education  Patient Education: Pt educated on role of SLP, results of evaluation and diet recommendations. Patient Education Response: Verbalizes understanding  Safety Devices in place: Yes  Type of devices: Left in bed;Bed alarm in place       Therapy Time  SLP Individual Minutes  Time In: 8003  Time Out: 1700 [facility]  Minutes: Rafy Stafford M.A.  [id number] [facility]  Speech Language Pathologist

## 2021-09-11 LAB
ANION GAP SERPL CALCULATED.3IONS-SCNC: 10 MMOL/L (ref 3–16)
BASOPHILS ABSOLUTE: 0 K/UL (ref 0–0.2)
BASOPHILS RELATIVE PERCENT: 0.2 %
BUN BLDV-MCNC: 33 MG/DL (ref 7–20)
CALCIUM SERPL-MCNC: 9 MG/DL (ref 8.3–10.6)
CHLORIDE BLD-SCNC: 100 MMOL/L (ref 99–110)
CO2: 27 MMOL/L (ref 21–32)
CREAT SERPL-MCNC: 1.7 MG/DL (ref 0.6–1.2)
EOSINOPHILS ABSOLUTE: 0 K/UL (ref 0–0.6)
EOSINOPHILS RELATIVE PERCENT: 0.1 %
GFR AFRICAN AMERICAN: 35
GFR NON-AFRICAN AMERICAN: 29
GLUCOSE BLD-MCNC: 141 MG/DL (ref 70–99)
GLUCOSE BLD-MCNC: 142 MG/DL (ref 70–99)
GLUCOSE BLD-MCNC: 172 MG/DL (ref 70–99)
GLUCOSE BLD-MCNC: 94 MG/DL (ref 70–99)
GLUCOSE BLD-MCNC: 99 MG/DL (ref 70–99)
HCT VFR BLD CALC: 26.5 % (ref 36–48)
HEMOGLOBIN: 8.7 G/DL (ref 12–16)
LYMPHOCYTES ABSOLUTE: 0.8 K/UL (ref 1–5.1)
LYMPHOCYTES RELATIVE PERCENT: 17.8 %
MCH RBC QN AUTO: 30.4 PG (ref 26–34)
MCHC RBC AUTO-ENTMCNC: 32.7 G/DL (ref 31–36)
MCV RBC AUTO: 92.9 FL (ref 80–100)
MONOCYTES ABSOLUTE: 0.2 K/UL (ref 0–1.3)
MONOCYTES RELATIVE PERCENT: 5 %
MRSA SCREEN RT-PCR: NORMAL
NEUTROPHILS ABSOLUTE: 3.3 K/UL (ref 1.7–7.7)
NEUTROPHILS RELATIVE PERCENT: 76.9 %
PDW BLD-RTO: 17.9 % (ref 12.4–15.4)
PERFORMED ON: ABNORMAL
PERFORMED ON: NORMAL
PLATELET # BLD: 246 K/UL (ref 135–450)
PMV BLD AUTO: 7.3 FL (ref 5–10.5)
POTASSIUM REFLEX MAGNESIUM: 5 MMOL/L (ref 3.5–5.1)
RBC # BLD: 2.86 M/UL (ref 4–5.2)
SODIUM BLD-SCNC: 137 MMOL/L (ref 136–145)
WBC # BLD: 4.3 K/UL (ref 4–11)

## 2021-09-11 PROCEDURE — 94640 AIRWAY INHALATION TREATMENT: CPT

## 2021-09-11 PROCEDURE — 2700000000 HC OXYGEN THERAPY PER DAY

## 2021-09-11 PROCEDURE — 6370000000 HC RX 637 (ALT 250 FOR IP): Performed by: NURSE PRACTITIONER

## 2021-09-11 PROCEDURE — 6360000002 HC RX W HCPCS: Performed by: STUDENT IN AN ORGANIZED HEALTH CARE EDUCATION/TRAINING PROGRAM

## 2021-09-11 PROCEDURE — 80048 BASIC METABOLIC PNL TOTAL CA: CPT

## 2021-09-11 PROCEDURE — 94660 CPAP INITIATION&MGMT: CPT

## 2021-09-11 PROCEDURE — 6370000000 HC RX 637 (ALT 250 FOR IP): Performed by: INTERNAL MEDICINE

## 2021-09-11 PROCEDURE — 85025 COMPLETE CBC W/AUTO DIFF WBC: CPT

## 2021-09-11 PROCEDURE — 2580000003 HC RX 258: Performed by: NURSE PRACTITIONER

## 2021-09-11 PROCEDURE — 2580000003 HC RX 258: Performed by: STUDENT IN AN ORGANIZED HEALTH CARE EDUCATION/TRAINING PROGRAM

## 2021-09-11 PROCEDURE — 2500000003 HC RX 250 WO HCPCS: Performed by: NURSE PRACTITIONER

## 2021-09-11 PROCEDURE — 1200000000 HC SEMI PRIVATE

## 2021-09-11 PROCEDURE — 92526 ORAL FUNCTION THERAPY: CPT

## 2021-09-11 PROCEDURE — 94761 N-INVAS EAR/PLS OXIMETRY MLT: CPT

## 2021-09-11 PROCEDURE — 36415 COLL VENOUS BLD VENIPUNCTURE: CPT

## 2021-09-11 PROCEDURE — 6360000002 HC RX W HCPCS: Performed by: INTERNAL MEDICINE

## 2021-09-11 PROCEDURE — 6370000000 HC RX 637 (ALT 250 FOR IP): Performed by: STUDENT IN AN ORGANIZED HEALTH CARE EDUCATION/TRAINING PROGRAM

## 2021-09-11 PROCEDURE — 2580000003 HC RX 258: Performed by: INTERNAL MEDICINE

## 2021-09-11 RX ORDER — BISACODYL 10 MG
10 SUPPOSITORY, RECTAL RECTAL DAILY PRN
Status: DISCONTINUED | OUTPATIENT
Start: 2021-09-11 | End: 2021-09-15 | Stop reason: HOSPADM

## 2021-09-11 RX ORDER — HYDROXYZINE HYDROCHLORIDE 10 MG/1
10 TABLET, FILM COATED ORAL 3 TIMES DAILY PRN
Status: DISCONTINUED | OUTPATIENT
Start: 2021-09-11 | End: 2021-09-15 | Stop reason: HOSPADM

## 2021-09-11 RX ORDER — SODIUM CHLORIDE, SODIUM LACTATE, POTASSIUM CHLORIDE, CALCIUM CHLORIDE 600; 310; 30; 20 MG/100ML; MG/100ML; MG/100ML; MG/100ML
INJECTION, SOLUTION INTRAVENOUS CONTINUOUS
Status: DISCONTINUED | OUTPATIENT
Start: 2021-09-11 | End: 2021-09-11

## 2021-09-11 RX ORDER — METOPROLOL TARTRATE 5 MG/5ML
5 INJECTION INTRAVENOUS ONCE
Status: COMPLETED | OUTPATIENT
Start: 2021-09-11 | End: 2021-09-11

## 2021-09-11 RX ORDER — POLYETHYLENE GLYCOL 3350 17 G/17G
17 POWDER, FOR SOLUTION ORAL 2 TIMES DAILY
Status: DISCONTINUED | OUTPATIENT
Start: 2021-09-11 | End: 2021-09-15 | Stop reason: HOSPADM

## 2021-09-11 RX ORDER — 0.9 % SODIUM CHLORIDE 0.9 %
500 INTRAVENOUS SOLUTION INTRAVENOUS ONCE
Status: DISCONTINUED | OUTPATIENT
Start: 2021-09-11 | End: 2021-09-11

## 2021-09-11 RX ADMIN — INSULIN LISPRO 1 UNITS: 100 INJECTION, SOLUTION INTRAVENOUS; SUBCUTANEOUS at 21:30

## 2021-09-11 RX ADMIN — SODIUM CHLORIDE, PRESERVATIVE FREE 10 ML: 5 INJECTION INTRAVENOUS at 09:20

## 2021-09-11 RX ADMIN — CLOPIDOGREL BISULFATE 75 MG: 75 TABLET ORAL at 09:13

## 2021-09-11 RX ADMIN — HYDROXYZINE HYDROCHLORIDE 10 MG: 10 TABLET ORAL at 21:24

## 2021-09-11 RX ADMIN — BISACODYL 10 MG: 10 SUPPOSITORY RECTAL at 20:36

## 2021-09-11 RX ADMIN — SODIUM CHLORIDE, POTASSIUM CHLORIDE, SODIUM LACTATE AND CALCIUM CHLORIDE: 600; 310; 30; 20 INJECTION, SOLUTION INTRAVENOUS at 11:28

## 2021-09-11 RX ADMIN — PANTOPRAZOLE SODIUM 20 MG: 20 TABLET, DELAYED RELEASE ORAL at 09:13

## 2021-09-11 RX ADMIN — HYDROXYZINE HYDROCHLORIDE 10 MG: 10 TABLET ORAL at 05:04

## 2021-09-11 RX ADMIN — BUDESONIDE 500 MCG: 0.5 SUSPENSION RESPIRATORY (INHALATION) at 08:06

## 2021-09-11 RX ADMIN — BUSPIRONE HYDROCHLORIDE 5 MG: 5 TABLET ORAL at 09:14

## 2021-09-11 RX ADMIN — ROFLUMILAST 250 MCG: 500 TABLET ORAL at 12:12

## 2021-09-11 RX ADMIN — SERTRALINE 50 MG: 50 TABLET, FILM COATED ORAL at 09:14

## 2021-09-11 RX ADMIN — IPRATROPIUM BROMIDE AND ALBUTEROL SULFATE 1 AMPULE: .5; 3 SOLUTION RESPIRATORY (INHALATION) at 11:48

## 2021-09-11 RX ADMIN — HYDROXYZINE HYDROCHLORIDE 10 MG: 10 TABLET ORAL at 13:17

## 2021-09-11 RX ADMIN — AZITHROMYCIN DIHYDRATE 500 MG: 250 TABLET, FILM COATED ORAL at 09:14

## 2021-09-11 RX ADMIN — HEPARIN SODIUM 5000 UNITS: 5000 INJECTION INTRAVENOUS; SUBCUTANEOUS at 21:30

## 2021-09-11 RX ADMIN — BUDESONIDE 500 MCG: 0.5 SUSPENSION RESPIRATORY (INHALATION) at 20:22

## 2021-09-11 RX ADMIN — ATORVASTATIN CALCIUM 20 MG: 10 TABLET, FILM COATED ORAL at 21:24

## 2021-09-11 RX ADMIN — FERROUS SULFATE TAB 325 MG (65 MG ELEMENTAL FE) 325 MG: 325 (65 FE) TAB at 09:13

## 2021-09-11 RX ADMIN — HEPARIN SODIUM 5000 UNITS: 5000 INJECTION INTRAVENOUS; SUBCUTANEOUS at 13:19

## 2021-09-11 RX ADMIN — POLYETHYLENE GLYCOL 3350 17 G: 17 POWDER, FOR SOLUTION ORAL at 09:16

## 2021-09-11 RX ADMIN — POLYETHYLENE GLYCOL 3350 17 G: 17 POWDER, FOR SOLUTION ORAL at 21:24

## 2021-09-11 RX ADMIN — SODIUM CHLORIDE 500 ML: 9 INJECTION, SOLUTION INTRAVENOUS at 00:10

## 2021-09-11 RX ADMIN — METOPROLOL TARTRATE 5 MG: 5 INJECTION INTRAVENOUS at 23:38

## 2021-09-11 RX ADMIN — BUSPIRONE HYDROCHLORIDE 5 MG: 5 TABLET ORAL at 21:24

## 2021-09-11 RX ADMIN — IPRATROPIUM BROMIDE AND ALBUTEROL SULFATE 1 AMPULE: .5; 3 SOLUTION RESPIRATORY (INHALATION) at 20:22

## 2021-09-11 RX ADMIN — PREDNISONE 40 MG: 20 TABLET ORAL at 09:13

## 2021-09-11 RX ADMIN — IPRATROPIUM BROMIDE AND ALBUTEROL SULFATE 1 AMPULE: .5; 3 SOLUTION RESPIRATORY (INHALATION) at 08:01

## 2021-09-11 RX ADMIN — FLUTICASONE PROPIONATE 1 SPRAY: 50 SPRAY, METERED NASAL at 11:26

## 2021-09-11 RX ADMIN — TIOTROPIUM BROMIDE INHALATION SPRAY 2 PUFF: 3.12 SPRAY, METERED RESPIRATORY (INHALATION) at 08:11

## 2021-09-11 RX ADMIN — SODIUM CHLORIDE, PRESERVATIVE FREE 10 ML: 5 INJECTION INTRAVENOUS at 21:24

## 2021-09-11 RX ADMIN — HEPARIN SODIUM 5000 UNITS: 5000 INJECTION INTRAVENOUS; SUBCUTANEOUS at 05:54

## 2021-09-11 RX ADMIN — MORPHINE SULFATE 0.5 MG: 2 INJECTION, SOLUTION INTRAMUSCULAR; INTRAVENOUS at 09:20

## 2021-09-11 RX ADMIN — INSULIN LISPRO 1 UNITS: 100 INJECTION, SOLUTION INTRAVENOUS; SUBCUTANEOUS at 13:18

## 2021-09-11 RX ADMIN — BUSPIRONE HYDROCHLORIDE 5 MG: 5 TABLET ORAL at 13:17

## 2021-09-11 RX ADMIN — INSULIN LISPRO 1 UNITS: 100 INJECTION, SOLUTION INTRAVENOUS; SUBCUTANEOUS at 17:48

## 2021-09-11 RX ADMIN — IPRATROPIUM BROMIDE AND ALBUTEROL SULFATE 1 AMPULE: .5; 3 SOLUTION RESPIRATORY (INHALATION) at 15:49

## 2021-09-11 ASSESSMENT — ENCOUNTER SYMPTOMS: TACHYPNEA: 1

## 2021-09-11 ASSESSMENT — PAIN SCALES - GENERAL: PAINLEVEL_OUTOF10: 0

## 2021-09-11 NOTE — PROGRESS NOTES
4 Eyes Skin Assessment     The patient is being assess for   Transfer to New Unit    I agree that 2 RN's have performed a thorough Head to Toe Skin Assessment on the patient. ALL assessment sites listed below have been assessed. Areas assessed for pressure by both nurses:   [x]   Head, Face, and Ears   [x]   Shoulders, Back, and Chest, Abdomen  [x]   Arms, Elbows, and Hands   [x]   Coccyx, Sacrum, and Ischium  [x]   Legs, Feet, and Heels      Redness and skin tear to buttocks, redness/bruising/tears to bilateral lower extremities, scattered bruising and abrasions     **SHARE this note so that the co-signing nurse is able to place an eSignature**    Co-signer eSignature: {Esignature:[id number]}    Does the Patient have Skin Breakdown related to pressure?   No            Trey Prevention initiated:  Yes   Wound Care Orders initiated:  NA      [facility] nurse consulted for Pressure Injury (Stage 3,4, Unstageable, DTI, NWPT, Complex wounds)and New or Established Ostomies:  NA      Primary Nurse eSignature: Electronically signed by Marika Stern on 9/11/21 at 7:57 AM EDT

## 2021-09-11 NOTE — PROGRESS NOTES
09/11/21 0137   NIV Type   Skin Assessment Clean, dry, & intact   Skin Protection for O2 Device Yes   Location Nose   NIV Started/Stopped On   Equipment Type V60   Mode Bilevel   Mask Type Full face mask   Mask Size Small   Settings/Measurements   IPAP 12 cmH20   CPAP/EPAP 6 cmH2O   Rate Ordered 12   Resp 24   FiO2  40 %   Vt Exhaled 427 mL   Minute Volume 10.4 Liters   Mask Leak (lpm) 0 lpm   Comfort Level Good   Using Accessory Muscles No   SpO2 96   Breath Sounds   Right Upper Lobe Expiratory Wheezes   Right Middle Lobe Diminished   Right Lower Lobe Expiratory Wheezes   Left Upper Lobe Diminished   Left Lower Lobe Diminished   Alarm Settings   Alarms On Y

## 2021-09-11 NOTE — ACP (ADVANCE CARE PLANNING)
ADVANCED CARE PLANNING - REMOTE     Name:Terri Virk       :  1940              MRN:  [id number]  Admission Date: 2021  1:05 AM  Date of Discussion:        Purpose of Encounter: Advanced care planning in light of worsening respiratory status. Parties in attendance: :Mansi Jones MD, Family members: [facility]. Decisional Capacity:Yes    Remote ACP visit was performed based on new provisions and guidance offered by [facility] on 2020 in setting of COVID 19 outbreak and in order to preserve personal protective equipment in accordance with the flexibilities announced by CMS on 2020. References:   [email or web address]://med. [email or web address]/[email or web address]/Resources/COVID-19/3_18%20Telemed%20Guidance%20Updated%20March%2018. pdf?[email or web address]   [email or web address]/. pdf     Objective/Medical Story: [de-identified] yoF with history of tobacco use and severe COPD on 2L NC with recurrent exacerbations this year who presents with increased sob, cough and productive sputum with exam notable for respiratory distress likely from COPD exacerbation. Improving with BIPAP but hospital course c/b worsening anxiety. .    Active Diagnoses:     Active Hospital Problems    Diagnosis Date Noted    Acute respiratory distress [R06.03] 2021    Volume overload [E87.70] 2021    Demand ischemia (HCC) [I24.8] 2021    GERD (gastroesophageal reflux disease) [K21.9] 2021    Essential hypertension [I10]     CAD (coronary artery disease) [I25.10] 2019    COPD exacerbation (HCC) [J44.1] 2017       These active diagnoses are of sufficient risk that focused discussion on advance care planning is indicated in order to allow the patient to thoughtfully consider personal goals of care; and if situations arise that prevent the ability to personally give input, to ensure appropriate representation of their personal desires for different levels and agressiveness of care. Goals of Care Determinations: Family will come to the hospital to discuss [address]. I expressed my concerns regarding her very tenuous respiratory status. I recommended readdressing code status as I worry that full code (cardiac/ invasive respiratory resuscitation) will only worsen her quality of life and it does not seem to align well with long term goals of being around family and engaged. I recommended the consideration of long term palliative measures and hospice as I feel her her 6 month prognosis is very poor (End stage COPD- 2016 PFT GOLD stage III, calss D, now with 3 admissions in 1 year)    Code Status: At this time patient wishes to be Full Code    Time Spent:     Total time spent face-to-face in education and discussion: 25 minutes. Electronically signed by Medardo Merlos MD on 9/11/2021 at 8:26 AM    Thank you Yosi Dean MD for the opportunity to be involved in this patient's care. If you have any questions or concerns please feel free to contact me at [phone].

## 2021-09-11 NOTE — PROGRESS NOTES
09/11/21 1555   NIV Type   NIV Started/Stopped On   Equipment Type v60   Mode Bilevel   Mask Type Full face mask   Mask Size Small   Settings/Measurements   IPAP 12 cmH20   CPAP/EPAP 6 cmH2O   Rate Ordered 12   Resp 25   FiO2  40 %   Vt Exhaled 467 mL   Mask Leak (lpm) 0 lpm   Comfort Level Good   Using Accessory Muscles No   SpO2 96   Breath Sounds   Right Upper Lobe Crackles; Expiratory Wheezes   Right Middle Lobe Crackles; Expiratory Wheezes   Right Lower Lobe Crackles; Expiratory Wheezes   Left Upper Lobe Crackles; Expiratory Wheezes   Left Lower Lobe Crackles; Expiratory Wheezes   Patient Observation   Observations mepilex placed on nose   Alarm Settings   Alarms On Y   Press Low Alarm 6 cmH2O   High Pressure Alarm 30 cmH2O   Resp Rate Low Alarm 6   High Respiratory Rate 40 br/min

## 2021-09-11 NOTE — PROGRESS NOTES
Hospitalist Progress Note      PCP: Saurav Burns MD    Date of Admission: 9/9/2021    Chief Complaint:sob, cough     Hospital Course: [de-identified] yoF with history of tobacco use and severe COPD on 2L NC with recurrent exacerbations this year who presents with increased sob, cough and productive sputum with exam notable for respiratory distress likely from COPD exacerbation. Improving with BIPAP but hospital course c/b worsening anxiety. Subjective: Pt feels her breathing has dramatically improved. She feels like she is breathing at baseline however has episodes of worsening anxiety which make her feel short of breath. I reintroduced [address] but pt did not want to discuss further this morning. She has not had a BM but is making urine. unclear the uop.       Medications:  Reviewed    Infusion Medications    lactated ringers      sodium chloride      dextrose       Scheduled Medications    busPIRone  5 mg Oral TID    heparin (porcine)  5,000 Units SubCUTAneous 3 times per day    fluticasone  1 spray Each Nostril Daily    atorvastatin  20 mg Oral Nightly    budesonide  0.5 mg Nebulization BID    clopidogrel  75 mg Oral Daily    ferrous sulfate  325 mg Oral Daily with breakfast    [Held by provider] metoprolol tartrate  25 mg Oral BID    pantoprazole  20 mg Oral Daily    Roflumilast  250 mcg Oral Daily    sertraline  50 mg Oral Daily    tiotropium  2 puff Inhalation Daily    sodium chloride flush  10 mL IntraVENous 2 times per day    ipratropium-albuterol  1 ampule Inhalation Q4H WA    insulin lispro  0-6 Units SubCUTAneous TID WC    insulin lispro  0-3 Units SubCUTAneous Nightly    azithromycin  500 mg Oral Daily    predniSONE  40 mg Oral Daily    polyethylene glycol  17 g Oral Daily     PRN Meds: hydrOXYzine, morphine, albuterol sulfate HFA, sodium chloride flush, sodium chloride, ondansetron **OR** ondansetron, acetaminophen **OR** acetaminophen, glucose, dextrose, glucagon (rDNA), dextrose      Intake/Output Summary (Last 24 hours) at 9/11/2021 0822  Last data filed at 9/10/2021 1740  Gross per 24 hour   Intake 1034 ml   Output    Net 1034 ml       Physical Exam Performed:    /63   Pulse 104   Temp 97.9 °F (36.6 °C) (Oral)   Resp 30 Comment: pt anxious  Ht 5' 2\" (1.575 m)   Wt 99 lb 10.4 oz (45.2 kg)   SpO2 99%   BMI 18.23 kg/m²     General appearance: uncomfortable with labored breathing. Speaking in interrupted sentences occasionally throughout encounter  Respiratory:  Labored breathing on 2L sat >93%. Pursed lip breathing. No wheezes on exam but limited air movement though hear bilaterally. Cardiovascular: tachycardic rhythm with normal S1/S2 without murmurs, rubs or gallops. Abdomen: Soft, non-tender, non-distended with normal bowel sounds. Musculoskeletal: No clubbing, cyanosis or edema bilaterally. Full range of motion without deformity. Skin: Skin color, texture, turgor normal.  No rashes or lesions. Neurologic:  Neurovascularly intact without any focal sensory/motor deficits. Cranial nerves: II-XII intact, grossly non-focal.  Psychiatric: Alert and oriented, thought content appropriate, normal insight  Capillary Refill: Brisk,3 seconds, normal   Peripheral Pulses: +2 palpable, equal bilaterally       Labs:   Recent Labs     09/09/21  1601 09/10/21  0549 09/11/21  0451   WBC 11.1* 10.3 4.3   HGB 9.0* 9.0* 8.7*   HCT 27.8* 27.9* 26.5*    340 246     Recent Labs     09/10/21  0549 09/10/21  1553 09/11/21  0450    135* 137   K 5.5* 5.4* 5.0   CL 98* 97* 100   CO2 31 29 27   BUN 32* 36* 33*   CREATININE 2.0* 2.2* 1.7*   CALCIUM 9.3 9.0 9.0     Recent Labs     09/09/21  0145   AST 18   ALT 8*   BILITOT 0.3   ALKPHOS 113     No results for input(s): INR in the last 72 hours.   Recent Labs     09/09/21  0145 09/09/21  1602 09/09/21  2017   TROPONINI 0.06* 0.09* 0.07*       Urinalysis:      Lab Results   Component Value Date    NITRU Negative 09/09/2021    WBCUA 0-2 09/09/2021    BACTERIA Rare 08/29/2021    RBCUA 0-2 09/09/2021    BLOODU Negative 09/09/2021    SPECGRAV 1.025 09/09/2021    GLUCOSEU Negative 09/09/2021       Radiology:  VL Extremity Venous Bilateral         NM LUNG VENT/PERFUSION (VQ)   Final Result   Very low probability for pulmonary embolism. XR CHEST PORTABLE   Final Result   Right basilar atelectasis, scarring or airspace disease. XR CHEST PORTABLE   Final Result   Mild bibasilar heterogeneous opacities are improved from the prior study and   could relate to subsegmental atelectasis/scarring, mild residual pulmonary   edema or improving infectious/inflammatory process. No new consolidation. Follow-up PA and lateral chest radiographs in 6 weeks may be helpful for   further evaluation. Hyperexpanded lung volume can be seen in the setting of COPD. Assessment/Plan:    Active Hospital Problems    Diagnosis     Acute respiratory distress [R06.03]     Volume overload [E87.70]     Demand ischemia (HCC) [I24.8]     GERD (gastroesophageal reflux disease) [K21.9]     Essential hypertension [I10]     CAD (coronary artery disease) [I25.10]     COPD exacerbation (HCC) [J44.1]      Acute Respiratory distress from COPD exacerbation (Gold staging (III) Severe class D). Precipitant unclear. D dimer elevated but under the age adjusted cut off (800). lower extremity dopplers w/o evidence of DVT and V/Q scan with low probability risk for PE. Echo 08/29 nrl EF with elevated diastolic filling pressures. VBG appear consistent with chronic respiratory acidosis with metabolic compensation. FEV1-30% (2016)  - Prednisone 40 (end date 09/13/2021)  - azithromycin 500mg (09/11/2021)  - Duonebs q4hr, acapella/air way clearence with RT, continue home nebulized ICS/spiriva/roflumilast  - BIPAP nightly and PRN daily  - continue home buspar and started PRN morphine d/t end stage COPD, ongoing attempt at [address].  Once family arrives will discuss more sustainable long term oral opioid if palliative measures pursued.     Type 2 Myocardial injury from demand ischemia in the setting of underlying CAD  - resolved. Acute renal failure- Creatinine peaked 2.0. was 1.2 on admission. Baseline ~1.0. Suspect prerenal in setting of 24 hours without PO intake, s/p lasix. Urine lytes reflect prerenal etiology. Improving with IVF. - encourage PO intake  - Gentle hydration 75 cc/hr x 12 hours     CAD/HTN- continue home BB, Clopidogrel, atorvastatin     Mood disorder continue home sertraline     GERD- continue home PPI     Underlying dementiadelirium- pt non focal exam. At baseline  - unable to reach [facility] for collateral information  - called family and spoke with daughter. Says pt does have decreased mobility from her previous Stroke. Quite bed bound. Has episodes of delirium when having COPD exacerbation but at baseline can be quite forgetful. Daughter confirmed pts code status and wishes. ACP note documented.       GOC-readdressed today 09/11. Family discussion today    DVT Prophylaxis: heparin TID d/t renal function  Diet: ADULT DIET;  Regular  Code Status: Full Code    PT/OT Eval Status: ordered    Dispo - pending clinical course    Julia Edwards MD

## 2021-09-11 NOTE — PROGRESS NOTES
Speech Language Pathology  Facility/Department: [place] [address] - MED SURG/ORTHO  Dysphagia Daily Treatment Note    NAME: Marsha Miller  : 1940  MRN: [id number]    Patient Diagnosis(es):   Patient Active Problem List    Diagnosis Date Noted    Acute respiratory distress 2021    Volume overload 2021    Demand ischemia ([address] 2021    GERD (gastroesophageal reflux disease) 2021    Severe malnutrition ([address] 2021    Acute respiratory failure ([address] 2021    Chronic respiratory failure with hypoxia ([address]     Essential hypertension     Anemia     Chest pain 2020    GI bleed 2020    Moderate malnutrition ([address] 2019    S/P CABG (coronary artery bypass graft) 2019    Pneumonia of both lower lobes due to methicillin resistant Staphylococcus aureus (MRSA) ([address] 2019    CAD in native artery 2019    Mucus plugging of bronchi     Status post aorto-coronary artery bypass graft     NADJA (acute kidney injury) ([address]     Hypernatremia 2019    Ischemic cardiomyopathy     Acute combined systolic and diastolic congestive heart failure (HCC)     Acute respiratory failure with hypoxia ([address] 2019    CAD (coronary artery disease) 2019    Acute pulmonary edema ([address] 2019    Pulmonary infiltrate 2019    Elevated brain natriuretic peptide (BNP) level 2019    Leukocytosis 2019    NSTEMI (non-ST elevated myocardial infarction) ([address] 2019    Abnormal chest x-ray     COPD with acute exacerbation (HCC)     Shortness of breath     Tobacco abuse     Sepsis ([address] 2019    COPD exacerbation ([address] 2017    OA (osteoarthritis) 2014    Tobacco use 2014    Hyperlipidemia 2013    Asthma 2013     Allergies:    Allergies   Allergen Reactions    Latex      Burning      Codeine      \"hallucinations\"     Subjective: [de-identified]year old female admitted on 21 with \"elevated troponin. \" CXR from 09/10/21 shows \"Right basilar atelectasis, scarring or airspace disease. \" Pt was admitted earlier this month with \"acute on chronic respiratory failure. \"    Pain: pt denies    Current Diet: ADULT DIET; Regular; Mildly Thick (Nectar)    Diet Tolerance:  Patient tolerating current diet level without signs/symptoms of penetration / aspiration. P.O. Trials: Thin   x Straw sip x 2   Nectar / Mildly Thick   x Cup sip x 10   Puree    Not tested today   Solid    Not tested today     Dysphagia Treatment and Impressions:  Upon SLP entering room, pt is exhibiting labored breathing with use of accessory muscles. Pt is on 2lpm O2 via NC, RR 28/min and O2 sat is 93%. Pt denies difficulty with swallowing and states: \"All I'm concerned about is getting this phlegm out so I can breathe! \"  Pt is agitated and upset about her breathing, but she does respond to cues to slow her breathing down, to use nasal cannula, and try to calm herself. Pt was given two sips of thin liquids via straw and she exhibited immediate wet coughing post swallow due to possible aspiration. Pt reports that she has thickener at home and that she has been recommended to thicken her liquids previously. Pt then states: \"Please stop. I'm starting to have a panic attack. \" Pt HR increased to 140s-150s and her O2 sat decreased to 82%. RN called to room. RN turned up pt's O2 slightly and waited at bedside while pt's oxygen saturation rebounded and HR came down. O2 leveled off at 94%. Pt tolerating nectar thick liquids via cup sip without immediate gabriela s/s of aspiration. Given that pt's most recent MBS on record is from 2019 and that pt has had multiple admission with respiratory difficulty this month, SLP would recommend repeat of Modified Barium swallow study. This was discussed with pt, and she stated: \"I'll only do it if I'm in a good mood on Monday. \" SLP reviewed how pt stands to benefit from [facility], and pt is agreeable to repeat MBS.  SLP will attempt to get order for MBS. SLP will downgrade pt to regular food textures with nectar thick liquids until MBS can be completed. Dysphagia Goals:  Timeframe for Long-term Goals: 7 days (9/17/21)  Goal 1: Pt will tolerate recommended diet w/o exhibiting overt s/s dysphagia. Today, 09/11: Not meeting. See note above. Ongoing. Short-term Goals  Timeframe for Short-term Goals: 5 days (9/15/21)  1. The patient will tolerate recommended diet without observed clinical signs of aspiration, Today, 09/11: Not meeting. See note above. Ongoing. 2. The patient/caregiver will demonstrate understanding of compensatory strategies for improved swallowing safety. Today, 09/11: Recommendations reviewed with pt and she is unable to demonstrate comprehension at time of follow-up due to anxiety and irritability. See note above. Ongoing. Recommendations:  Solid Consistency: continue REGULAR (until MBS can be completed)  Liquid Consistency: downgrade to NECTAR (until MBS can be completed)  Medication: whole in puree    Recommend Modified Barium swallow study (to be completed Monday, if ordered)    Will increase treatment frequency to 3-5x/wk, pending MBS results    Patient/Family/Caregiver Education: reviewed MBS procedure, plan to downgrade to NTL    Compensatory Strategies:   Eat/Feed slowly;Upright as possible for all oral intake;Remain upright for 30-45 minutes after meals;Small bites/sips; External pacing    Plan:    Continued Dysphagia treatment with goals per plan of care. Discharge Recommendations: defer to PT/OT    If pt discharges from hospital prior to Speech/Swallowing discharge, this note serves as tx and discharge summary. Total Treatment Time / Charges     Time in Time out Total Time / units   Cognitive Tx         Speech Tx      Dysphagia Tx  [id number] 0114  26 min / 1 unit     Signature:    Luly Severino, [id number] [id number]  Speech-Language Pathologist

## 2021-09-11 NOTE — PROGRESS NOTES
09/11/21 0432   NIV Type   NIV Started/Stopped On   Equipment Type v60   Mode Bilevel   Mask Type Full face mask   Mask Size Small   Settings/Measurements   IPAP 12 cmH20   CPAP/EPAP 6 cmH2O   Rate Ordered 12   Resp 25   FiO2  40 %   Vt Exhaled 259 mL   Minute Volume 6.2 Liters   Mask Leak (lpm) 0 lpm   Comfort Level Good   Using Accessory Muscles No   SpO2 95   Breath Sounds   Right Upper Lobe Diminished   Right Middle Lobe Diminished   Right Lower Lobe Diminished   Left Upper Lobe Diminished   Left Lower Lobe Diminished   Alarm Settings   Alarms On Y

## 2021-09-12 ENCOUNTER — APPOINTMENT (OUTPATIENT)
Dept: GENERAL RADIOLOGY | Age: 81
DRG: 189 | End: 2021-09-12
Payer: MEDICARE

## 2021-09-12 LAB
ANION GAP SERPL CALCULATED.3IONS-SCNC: 9 MMOL/L (ref 3–16)
BASE EXCESS VENOUS: 6.9 MMOL/L (ref -3–3)
BASOPHILS ABSOLUTE: 0 K/UL (ref 0–0.2)
BASOPHILS RELATIVE PERCENT: 0.1 %
BUN BLDV-MCNC: 24 MG/DL (ref 7–20)
CALCIUM SERPL-MCNC: 9.3 MG/DL (ref 8.3–10.6)
CARBOXYHEMOGLOBIN: 1.9 % (ref 0–1.5)
CHLORIDE BLD-SCNC: 105 MMOL/L (ref 99–110)
CO2: 30 MMOL/L (ref 21–32)
CREAT SERPL-MCNC: 1.2 MG/DL (ref 0.6–1.2)
EOSINOPHILS ABSOLUTE: 0 K/UL (ref 0–0.6)
EOSINOPHILS RELATIVE PERCENT: 0.1 %
GFR AFRICAN AMERICAN: 52
GFR NON-AFRICAN AMERICAN: 43
GLUCOSE BLD-MCNC: 101 MG/DL (ref 70–99)
GLUCOSE BLD-MCNC: 148 MG/DL (ref 70–99)
GLUCOSE BLD-MCNC: 165 MG/DL (ref 70–99)
GLUCOSE BLD-MCNC: 91 MG/DL (ref 70–99)
GLUCOSE BLD-MCNC: 94 MG/DL (ref 70–99)
HCO3 VENOUS: 32.3 MMOL/L (ref 23–29)
HCT VFR BLD CALC: 23.1 % (ref 36–48)
HEMOGLOBIN: 7.6 G/DL (ref 12–16)
LYMPHOCYTES ABSOLUTE: 0.8 K/UL (ref 1–5.1)
LYMPHOCYTES RELATIVE PERCENT: 14.3 %
MCH RBC QN AUTO: 31.2 PG (ref 26–34)
MCHC RBC AUTO-ENTMCNC: 32.9 G/DL (ref 31–36)
MCV RBC AUTO: 94.7 FL (ref 80–100)
METHEMOGLOBIN VENOUS: 0.7 %
MONOCYTES ABSOLUTE: 0.4 K/UL (ref 0–1.3)
MONOCYTES RELATIVE PERCENT: 7.5 %
NEUTROPHILS ABSOLUTE: 4.4 K/UL (ref 1.7–7.7)
NEUTROPHILS RELATIVE PERCENT: 78 %
O2 SAT, VEN: 95 %
O2 THERAPY: ABNORMAL
PCO2, VEN: 51.4 MMHG (ref 40–50)
PDW BLD-RTO: 17.9 % (ref 12.4–15.4)
PERFORMED ON: ABNORMAL
PERFORMED ON: NORMAL
PH VENOUS: 7.42 (ref 7.35–7.45)
PLATELET # BLD: 269 K/UL (ref 135–450)
PMV BLD AUTO: 7.4 FL (ref 5–10.5)
PO2, VEN: 80.8 MMHG (ref 25–40)
POTASSIUM REFLEX MAGNESIUM: 4.5 MMOL/L (ref 3.5–5.1)
RBC # BLD: 2.43 M/UL (ref 4–5.2)
SODIUM BLD-SCNC: 144 MMOL/L (ref 136–145)
TCO2 CALC VENOUS: 34 MMOL/L
WBC # BLD: 5.7 K/UL (ref 4–11)

## 2021-09-12 PROCEDURE — 85025 COMPLETE CBC W/AUTO DIFF WBC: CPT

## 2021-09-12 PROCEDURE — 82803 BLOOD GASES ANY COMBINATION: CPT

## 2021-09-12 PROCEDURE — 2580000003 HC RX 258: Performed by: INTERNAL MEDICINE

## 2021-09-12 PROCEDURE — 94761 N-INVAS EAR/PLS OXIMETRY MLT: CPT

## 2021-09-12 PROCEDURE — 80048 BASIC METABOLIC PNL TOTAL CA: CPT

## 2021-09-12 PROCEDURE — 6370000000 HC RX 637 (ALT 250 FOR IP): Performed by: INTERNAL MEDICINE

## 2021-09-12 PROCEDURE — 6360000002 HC RX W HCPCS: Performed by: INTERNAL MEDICINE

## 2021-09-12 PROCEDURE — 1200000000 HC SEMI PRIVATE

## 2021-09-12 PROCEDURE — 71045 X-RAY EXAM CHEST 1 VIEW: CPT

## 2021-09-12 PROCEDURE — 94660 CPAP INITIATION&MGMT: CPT

## 2021-09-12 PROCEDURE — 2700000000 HC OXYGEN THERAPY PER DAY

## 2021-09-12 PROCEDURE — 6370000000 HC RX 637 (ALT 250 FOR IP): Performed by: STUDENT IN AN ORGANIZED HEALTH CARE EDUCATION/TRAINING PROGRAM

## 2021-09-12 PROCEDURE — 94640 AIRWAY INHALATION TREATMENT: CPT

## 2021-09-12 PROCEDURE — 6360000002 HC RX W HCPCS: Performed by: STUDENT IN AN ORGANIZED HEALTH CARE EDUCATION/TRAINING PROGRAM

## 2021-09-12 PROCEDURE — 51798 US URINE CAPACITY MEASURE: CPT

## 2021-09-12 PROCEDURE — 36415 COLL VENOUS BLD VENIPUNCTURE: CPT

## 2021-09-12 RX ADMIN — ATORVASTATIN CALCIUM 20 MG: 10 TABLET, FILM COATED ORAL at 23:15

## 2021-09-12 RX ADMIN — HEPARIN SODIUM 5000 UNITS: 5000 INJECTION INTRAVENOUS; SUBCUTANEOUS at 06:00

## 2021-09-12 RX ADMIN — PANTOPRAZOLE SODIUM 20 MG: 20 TABLET, DELAYED RELEASE ORAL at 09:33

## 2021-09-12 RX ADMIN — PREDNISONE 40 MG: 20 TABLET ORAL at 09:34

## 2021-09-12 RX ADMIN — IPRATROPIUM BROMIDE AND ALBUTEROL SULFATE 1 AMPULE: .5; 3 SOLUTION RESPIRATORY (INHALATION) at 11:26

## 2021-09-12 RX ADMIN — FLUTICASONE PROPIONATE 1 SPRAY: 50 SPRAY, METERED NASAL at 09:36

## 2021-09-12 RX ADMIN — BUDESONIDE 500 MCG: 0.5 SUSPENSION RESPIRATORY (INHALATION) at 08:25

## 2021-09-12 RX ADMIN — ROFLUMILAST 250 MCG: 500 TABLET ORAL at 09:41

## 2021-09-12 RX ADMIN — INSULIN LISPRO 1 UNITS: 100 INJECTION, SOLUTION INTRAVENOUS; SUBCUTANEOUS at 17:31

## 2021-09-12 RX ADMIN — METOPROLOL TARTRATE 25 MG: 25 TABLET, FILM COATED ORAL at 09:33

## 2021-09-12 RX ADMIN — HEPARIN SODIUM 5000 UNITS: 5000 INJECTION INTRAVENOUS; SUBCUTANEOUS at 23:16

## 2021-09-12 RX ADMIN — IPRATROPIUM BROMIDE AND ALBUTEROL SULFATE 1 AMPULE: .5; 3 SOLUTION RESPIRATORY (INHALATION) at 08:25

## 2021-09-12 RX ADMIN — HEPARIN SODIUM 5000 UNITS: 5000 INJECTION INTRAVENOUS; SUBCUTANEOUS at 14:27

## 2021-09-12 RX ADMIN — ONDANSETRON 4 MG: 4 TABLET, ORALLY DISINTEGRATING ORAL at 09:47

## 2021-09-12 RX ADMIN — BUSPIRONE HYDROCHLORIDE 5 MG: 5 TABLET ORAL at 14:26

## 2021-09-12 RX ADMIN — CLOPIDOGREL BISULFATE 75 MG: 75 TABLET ORAL at 09:33

## 2021-09-12 RX ADMIN — FERROUS SULFATE TAB 325 MG (65 MG ELEMENTAL FE) 325 MG: 325 (65 FE) TAB at 09:33

## 2021-09-12 RX ADMIN — SODIUM CHLORIDE, PRESERVATIVE FREE 10 ML: 5 INJECTION INTRAVENOUS at 23:15

## 2021-09-12 RX ADMIN — SERTRALINE 50 MG: 50 TABLET, FILM COATED ORAL at 09:33

## 2021-09-12 RX ADMIN — INSULIN LISPRO 1 UNITS: 100 INJECTION, SOLUTION INTRAVENOUS; SUBCUTANEOUS at 23:16

## 2021-09-12 RX ADMIN — IPRATROPIUM BROMIDE AND ALBUTEROL SULFATE 1 AMPULE: .5; 3 SOLUTION RESPIRATORY (INHALATION) at 15:47

## 2021-09-12 RX ADMIN — HYDROXYZINE HYDROCHLORIDE 10 MG: 10 TABLET ORAL at 23:15

## 2021-09-12 RX ADMIN — SODIUM CHLORIDE, PRESERVATIVE FREE 10 ML: 5 INJECTION INTRAVENOUS at 09:37

## 2021-09-12 RX ADMIN — BUDESONIDE 500 MCG: 0.5 SUSPENSION RESPIRATORY (INHALATION) at 19:46

## 2021-09-12 RX ADMIN — IPRATROPIUM BROMIDE AND ALBUTEROL SULFATE 1 AMPULE: .5; 3 SOLUTION RESPIRATORY (INHALATION) at 19:46

## 2021-09-12 RX ADMIN — BUSPIRONE HYDROCHLORIDE 5 MG: 5 TABLET ORAL at 09:33

## 2021-09-12 RX ADMIN — BUSPIRONE HYDROCHLORIDE 5 MG: 5 TABLET ORAL at 23:15

## 2021-09-12 RX ADMIN — TIOTROPIUM BROMIDE INHALATION SPRAY 2 PUFF: 3.12 SPRAY, METERED RESPIRATORY (INHALATION) at 08:26

## 2021-09-12 RX ADMIN — METOPROLOL TARTRATE 25 MG: 25 TABLET, FILM COATED ORAL at 23:15

## 2021-09-12 RX ADMIN — POLYETHYLENE GLYCOL 3350 17 G: 17 POWDER, FOR SOLUTION ORAL at 23:15

## 2021-09-12 RX ADMIN — HYDROXYZINE HYDROCHLORIDE 10 MG: 10 TABLET ORAL at 08:09

## 2021-09-12 ASSESSMENT — PAIN SCALES - GENERAL: PAINLEVEL_OUTOF10: 0

## 2021-09-12 NOTE — PROGRESS NOTES
09/12/21 0005   NIV Type   $NIV $Daily Charge   Skin Protection for O2 Device Yes   Location Nose   NIV Started/Stopped On   Equipment Type V60   Mode Bilevel   Mask Type Full face mask   Mask Size Small   Settings/Measurements   IPAP 12 cmH20   CPAP/EPAP 6 cmH2O   Rate Ordered 12   Resp 23   FiO2  40 %   Vt Exhaled 441 mL   Minute Volume 11.7 Liters   Mask Leak (lpm) 23 lpm   Comfort Level Good   Using Accessory Muscles No   SpO2 99   Alarm Settings   Alarms On Y

## 2021-09-12 NOTE — PROGRESS NOTES
Responded to rapid response, patient had increased work of breathing, coughing episode while eating [employer] (supposed to be on modified diet) here for severe COPD with exertion exacerbation, currently appears comfortable none rebreather, chronically ill, persistent pain with breathing with muscle wasting and cachexia, diffuse reduction in air entry no stridor. Patient placed on BiPAP, patient to continue modified diet only, chest x-ray and VBG obtained. Discussed with the primary attending. Patient transferred to progressive care unit for close observation.     Electronically signed by Bertha Jennings MD on 9/12/2021 at 6:27 PM

## 2021-09-12 NOTE — PROGRESS NOTES
Hospitalist Progress Note      PCP: Rosita Love MD    Date of Admission: 9/9/2021    Chief Complaint:sob, cough     Hospital Course: [de-identified] yoF with history of tobacco use and severe COPD on 2L NC with recurrent exacerbations this year who presents with increased sob, cough and productive sputum with exam notable for respiratory distress likely from COPD exacerbation. Improving with BIPAP but hospital course c/b worsening anxiety. Subjective: Pt feels much better. Daughter at bedside. Denies new or worsening sob, cough. Has increased sputum production though unchanged. Again revisited GOC and pt wishes to remain Full code. Daughter present when discussed. She is open to having palliative care see her in the morning.       Medications:  Reviewed    Infusion Medications    sodium chloride      dextrose       Scheduled Medications    polyethylene glycol  17 g Oral BID    busPIRone  5 mg Oral TID    heparin (porcine)  5,000 Units SubCUTAneous 3 times per day    fluticasone  1 spray Each Nostril Daily    atorvastatin  20 mg Oral Nightly    budesonide  0.5 mg Nebulization BID    clopidogrel  75 mg Oral Daily    ferrous sulfate  325 mg Oral Daily with breakfast    metoprolol tartrate  25 mg Oral BID    pantoprazole  20 mg Oral Daily    Roflumilast  250 mcg Oral Daily    sertraline  50 mg Oral Daily    tiotropium  2 puff Inhalation Daily    sodium chloride flush  10 mL IntraVENous 2 times per day    ipratropium-albuterol  1 ampule Inhalation Q4H WA    insulin lispro  0-6 Units SubCUTAneous TID WC    insulin lispro  0-3 Units SubCUTAneous Nightly    predniSONE  40 mg Oral Daily     PRN Meds: hydrOXYzine, bisacodyl, morphine, albuterol sulfate HFA, sodium chloride flush, sodium chloride, ondansetron **OR** ondansetron, acetaminophen **OR** acetaminophen, glucose, dextrose, glucagon (rDNA), dextrose      Intake/Output Summary (Last 24 hours) at 9/12/2021 5800  Last data filed at 9/12/2021 1422  Gross per 24 hour   Intake 100 ml   Output 1200 ml   Net -1100 ml       Physical Exam Performed:    /74   Pulse 128   Temp 98.1 °F (36.7 °C) (Oral)   Resp 27   Ht 5' 2\" (1.575 m)   Wt 103 lb 9.9 oz (47 kg)   SpO2 94%   BMI 18.95 kg/m²     General appearance: comfortable breathing at baseline, able to speak in complete sentences. Respiratory: on 2L NC with diminished air movement bilaterally. No wheezes. Cardiovascular: tachycardic rhythm with normal S1/S2 without murmurs, rubs or gallops. Abdomen: Soft, non-tender, non-distended with normal bowel sounds. Musculoskeletal: No clubbing, cyanosis or edema bilaterally. Full range of motion without deformity. Skin: Skin color, texture, turgor normal.  No rashes or lesions. Neurologic:  Neurovascularly intact without any focal sensory/motor deficits. Cranial nerves: II-XII intact, grossly non-focal.  Psychiatric: Alert and oriented, thought content appropriate, normal insight  Capillary Refill: Brisk,3 seconds, normal   Peripheral Pulses: +2 palpable, equal bilaterally       Labs:   Recent Labs     09/10/21  0549 09/11/21  0451 09/12/21  0644   WBC 10.3 4.3 5.7   HGB 9.0* 8.7* 7.6*   HCT 27.9* 26.5* 23.1*    246 269     Recent Labs     09/10/21  1553 09/11/21  0450 09/12/21  0644   * 137 144   K 5.4* 5.0 4.5   CL 97* 100 105   CO2 29 27 30   BUN 36* 33* 24*   CREATININE 2.2* 1.7* 1.2   CALCIUM 9.0 9.0 9.3     No results for input(s): AST, ALT, BILIDIR, BILITOT, ALKPHOS in the last 72 hours. No results for input(s): INR in the last 72 hours.   Recent Labs     09/09/21  2017   TROPONINI 0.07*       Urinalysis:      Lab Results   Component Value Date    NITRU Negative 09/09/2021    WBCUA 0-2 09/09/2021    BACTERIA Rare 08/29/2021    RBCUA 0-2 09/09/2021    BLOODU Negative 09/09/2021    SPECGRAV 1.025 09/09/2021    GLUCOSEU Negative 09/09/2021       Radiology:  VL Extremity Venous Bilateral   Final Result      NM LUNG VENT/PERFUSION (VQ) Final Result   Very low probability for pulmonary embolism. XR CHEST PORTABLE   Final Result   Right basilar atelectasis, scarring or airspace disease. XR CHEST PORTABLE   Final Result   Mild bibasilar heterogeneous opacities are improved from the prior study and   could relate to subsegmental atelectasis/scarring, mild residual pulmonary   edema or improving infectious/inflammatory process. No new consolidation. Follow-up PA and lateral chest radiographs in 6 weeks may be helpful for   further evaluation. Hyperexpanded lung volume can be seen in the setting of COPD. FL MODIFIED BARIUM SWALLOW W VIDEO    (Results Pending)   XR CHEST PORTABLE    (Results Pending)           Assessment/Plan:    Active Hospital Problems    Diagnosis     Acute respiratory distress [R06.03]     Volume overload [E87.70]     Demand ischemia (HCC) [I24.8]     GERD (gastroesophageal reflux disease) [K21.9]     Essential hypertension [I10]     CAD (coronary artery disease) [I25.10]     COPD exacerbation (HCC) [J44.1]      Acute Respiratory distress from COPD exacerbation (Gold staging (III) Severe class D). Precipitant unclear. D dimer elevated but under the age adjusted cut off (800). lower extremity dopplers w/o evidence of DVT and V/Q scan with low probability risk for PE. Echo 08/29 nrl EF with elevated diastolic filling pressures. VBG appear consistent with chronic respiratory acidosis with metabolic compensation. FEV1-30% (2016)  - Prednisone 40 (end date 09/13/2021)  - azithromycin 500mg (09/11/2021)  - Duonebs q4hr, acapella/air way clearence with RT, continue home nebulized ICS/spiriva/roflumilast  - BIPAP nightly and PRN daily  - continue home buspar and started PRN morphine d/t end stage COPD, ongoing attempt at [facility]. - palliative consulted, appreciate help     Type 2 Myocardial injury from demand ischemia in the setting of underlying CAD  - resolved.     Acute renal failure- Creatinine peaked 2.0. was 1.2 on admission. Baseline ~1.0. Suspect prerenal in setting of 24 hours without PO intake, s/p lasix. Urine lytes reflect prerenal etiology. Improving with IVF. - encourage PO intake  - Gentle IVF as needed     CAD/HTN- continue home BB, Clopidogrel, atorvastatin     Mood disorder continue home sertraline     GERD- continue home PPI     Underlying dementiadelirium- pt non focal exam. At baseline  - unable to reach [facility] for collateral information  - called family and spoke with daughter. Says pt does have decreased mobility from her previous Stroke. Quite bed bound. Has episodes of delirium when having COPD exacerbation but at baseline can be quite forgetful. Daughter confirmed pts code status and wishes. ACP note documented.       GOC-readdressed today 09/12. No change    DVT Prophylaxis: heparin TID d/t renal function  Diet: ADULT DIET;  Regular; Mildly Thick (Nectar)  Code Status: Full Code    PT/OT Eval Status: ordered    Dispo - pending clinical course    Vee Wang MD

## 2021-09-12 NOTE — PROGRESS NOTES
: deb odonnell RM: [phone] pt wants a suppository for her bowels has been x1 week  Perfect served hospitalists

## 2021-09-12 NOTE — PROGRESS NOTES
18:04   Rapid Response Called     18:05   Entered the pts room. Primary RN and Pts daughter at bedside. RN stated that Pt was on 3L nasal canula eating dinner and spo2 sats dropped to the 80s. Pt was on NRB when this RN arrived. 18:07   Pts spo2 sats sustaining at 100%    18:08   BP           124/64                 spo2        100%              HR           115     18:13  MD wants to transfer Pt to C4. MD ordered VBG and Chest Xray. 18:14   Spo2 Sats at 100%. Pt placed on Cont. Bipap at 40% O2.     18:17   RT and Primary RN at bedside. Pt stable.  Rapid Response End.

## 2021-09-12 NOTE — PROGRESS NOTES
This writer entered patients room and daughter was at bedside. Daughter brought in [email or web address] and had given the patient thin liquids. Writer educated patient and daughter on the risks and offered thickener.

## 2021-09-12 NOTE — PLAN OF CARE
Problem: Falls - Risk of:  Goal: Will remain free from falls  Description: Will remain free from falls  9/12/2021 1527 by Josh Amos RN  Outcome: Ongoing  9/12/2021 0407 by Keith Alejandro RN  Outcome: Ongoing  Goal: Absence of physical injury  Description: Absence of physical injury  9/12/2021 1527 by Josh Amos RN  Outcome: Ongoing  9/12/2021 0407 by Keith Alejandro RN  Outcome: Ongoing     Problem: Skin Integrity:  Goal: Will show no infection signs and symptoms  Description: Will show no infection signs and symptoms  Outcome: Ongoing  Goal: Absence of new skin breakdown  Description: Absence of new skin breakdown  Outcome: Ongoing

## 2021-09-12 NOTE — PROGRESS NOTES
Pt instructed on  Need for sputum sample, encouraged turning with assist head of bed elevated pt refused to stay on he side states cannot breathe., bottom red and excoriated

## 2021-09-12 NOTE — PROGRESS NOTES
09/11/21 2200   NIV Type   Skin Assessment Clean, dry, & intact   Skin Protection for O2 Device Yes   Location Nose   Equipment Type V60   Mode Bilevel   Mask Type Full face mask   Mask Size Small   Settings/Measurements   IPAP 12 cmH20   CPAP/EPAP 6 cmH2O   Resp 27   FiO2  40 %   Vt Exhaled 462 mL   Minute Volume 12 Liters   Mask Leak (lpm) 0 lpm   Comfort Level Good   Using Accessory Muscles No   SpO2 99

## 2021-09-12 NOTE — PROGRESS NOTES
Pt with anxiety and heart rate in 150-160 hospitalists was notified orders taken informed of meds taken and meds on hold and hospitalists notes on days

## 2021-09-12 NOTE — PLAN OF CARE
Problem: Falls - Risk of:  Goal: Will remain free from falls  Description: Will remain free from falls  9/12/2021 0407 by Myla Weathers RN  Outcome: Ongoing  9/11/2021 1523 by Adriel Haines RN  Outcome: Ongoing     Problem: Falls - Risk of:  Goal: Absence of physical injury  Description: Absence of physical injury  Outcome: Ongoing

## 2021-09-12 NOTE — PROGRESS NOTES
09/12/21 0354   NIV Type   Skin Protection for O2 Device Yes   Location Nose   NIV Started/Stopped On   Equipment Type V60   Mode Bilevel   Mask Type Full face mask   Mask Size Small   Settings/Measurements   IPAP 12 cmH20   CPAP/EPAP 6 cmH2O   Rate Ordered 12   Resp 24   FiO2  40 %   Vt Exhaled 401 mL   Minute Volume 10.2 Liters   Mask Leak (lpm) 20 lpm   Comfort Level Good   Using Accessory Muscles No   SpO2 96   Alarm Settings   Alarms On Y

## 2021-09-13 ENCOUNTER — APPOINTMENT (OUTPATIENT)
Dept: GENERAL RADIOLOGY | Age: 81
DRG: 189 | End: 2021-09-13
Payer: MEDICARE

## 2021-09-13 LAB
ANION GAP SERPL CALCULATED.3IONS-SCNC: 6 MMOL/L (ref 3–16)
BASOPHILS ABSOLUTE: 0 K/UL (ref 0–0.2)
BASOPHILS RELATIVE PERCENT: 0.1 %
BLOOD CULTURE, ROUTINE: NORMAL
BUN BLDV-MCNC: 19 MG/DL (ref 7–20)
CALCIUM SERPL-MCNC: 9.5 MG/DL (ref 8.3–10.6)
CHLORIDE BLD-SCNC: 105 MMOL/L (ref 99–110)
CO2: 33 MMOL/L (ref 21–32)
CREAT SERPL-MCNC: 1.1 MG/DL (ref 0.6–1.2)
CULTURE, BLOOD 2: NORMAL
EOSINOPHILS ABSOLUTE: 0 K/UL (ref 0–0.6)
EOSINOPHILS RELATIVE PERCENT: 0.3 %
GFR AFRICAN AMERICAN: 58
GFR NON-AFRICAN AMERICAN: 48
GLUCOSE BLD-MCNC: 100 MG/DL (ref 70–99)
GLUCOSE BLD-MCNC: 103 MG/DL (ref 70–99)
GLUCOSE BLD-MCNC: 118 MG/DL (ref 70–99)
GLUCOSE BLD-MCNC: 151 MG/DL (ref 70–99)
GLUCOSE BLD-MCNC: 191 MG/DL (ref 70–99)
HCT VFR BLD CALC: 24.1 % (ref 36–48)
HEMOGLOBIN: 7.6 G/DL (ref 12–16)
LYMPHOCYTES ABSOLUTE: 0.5 K/UL (ref 1–5.1)
LYMPHOCYTES RELATIVE PERCENT: 8.3 %
MCH RBC QN AUTO: 28.5 PG (ref 26–34)
MCHC RBC AUTO-ENTMCNC: 31.5 G/DL (ref 31–36)
MCV RBC AUTO: 90.7 FL (ref 80–100)
MONOCYTES ABSOLUTE: 0.3 K/UL (ref 0–1.3)
MONOCYTES RELATIVE PERCENT: 4.9 %
NEUTROPHILS ABSOLUTE: 5.2 K/UL (ref 1.7–7.7)
NEUTROPHILS RELATIVE PERCENT: 86.4 %
PDW BLD-RTO: 18.1 % (ref 12.4–15.4)
PERFORMED ON: ABNORMAL
PLATELET # BLD: 290 K/UL (ref 135–450)
PMV BLD AUTO: 7.8 FL (ref 5–10.5)
POTASSIUM REFLEX MAGNESIUM: 4.5 MMOL/L (ref 3.5–5.1)
RBC # BLD: 2.65 M/UL (ref 4–5.2)
SODIUM BLD-SCNC: 144 MMOL/L (ref 136–145)
WBC # BLD: 6 K/UL (ref 4–11)

## 2021-09-13 PROCEDURE — 85025 COMPLETE CBC W/AUTO DIFF WBC: CPT

## 2021-09-13 PROCEDURE — 2700000000 HC OXYGEN THERAPY PER DAY

## 2021-09-13 PROCEDURE — 74230 X-RAY XM SWLNG FUNCJ C+: CPT

## 2021-09-13 PROCEDURE — 94761 N-INVAS EAR/PLS OXIMETRY MLT: CPT

## 2021-09-13 PROCEDURE — 80048 BASIC METABOLIC PNL TOTAL CA: CPT

## 2021-09-13 PROCEDURE — 6370000000 HC RX 637 (ALT 250 FOR IP): Performed by: INTERNAL MEDICINE

## 2021-09-13 PROCEDURE — 94660 CPAP INITIATION&MGMT: CPT

## 2021-09-13 PROCEDURE — 92526 ORAL FUNCTION THERAPY: CPT

## 2021-09-13 PROCEDURE — 6370000000 HC RX 637 (ALT 250 FOR IP): Performed by: NURSE PRACTITIONER

## 2021-09-13 PROCEDURE — 6360000002 HC RX W HCPCS: Performed by: INTERNAL MEDICINE

## 2021-09-13 PROCEDURE — 94669 MECHANICAL CHEST WALL OSCILL: CPT

## 2021-09-13 PROCEDURE — 1200000000 HC SEMI PRIVATE

## 2021-09-13 PROCEDURE — 92611 MOTION FLUOROSCOPY/SWALLOW: CPT

## 2021-09-13 PROCEDURE — 6360000002 HC RX W HCPCS: Performed by: STUDENT IN AN ORGANIZED HEALTH CARE EDUCATION/TRAINING PROGRAM

## 2021-09-13 PROCEDURE — 2580000003 HC RX 258: Performed by: INTERNAL MEDICINE

## 2021-09-13 PROCEDURE — 6370000000 HC RX 637 (ALT 250 FOR IP): Performed by: STUDENT IN AN ORGANIZED HEALTH CARE EDUCATION/TRAINING PROGRAM

## 2021-09-13 PROCEDURE — 94640 AIRWAY INHALATION TREATMENT: CPT

## 2021-09-13 PROCEDURE — 36415 COLL VENOUS BLD VENIPUNCTURE: CPT

## 2021-09-13 RX ORDER — GUAIFENESIN 600 MG/1
600 TABLET, EXTENDED RELEASE ORAL 2 TIMES DAILY
Status: DISCONTINUED | OUTPATIENT
Start: 2021-09-13 | End: 2021-09-15 | Stop reason: HOSPADM

## 2021-09-13 RX ORDER — MECOBALAMIN 5000 MCG
5 TABLET,DISINTEGRATING ORAL NIGHTLY PRN
Status: DISCONTINUED | OUTPATIENT
Start: 2021-09-13 | End: 2021-09-15 | Stop reason: HOSPADM

## 2021-09-13 RX ADMIN — IPRATROPIUM BROMIDE AND ALBUTEROL SULFATE 1 AMPULE: .5; 3 SOLUTION RESPIRATORY (INHALATION) at 16:11

## 2021-09-13 RX ADMIN — PREDNISONE 40 MG: 20 TABLET ORAL at 08:19

## 2021-09-13 RX ADMIN — INSULIN LISPRO 1 UNITS: 100 INJECTION, SOLUTION INTRAVENOUS; SUBCUTANEOUS at 19:15

## 2021-09-13 RX ADMIN — IPRATROPIUM BROMIDE AND ALBUTEROL SULFATE 1 AMPULE: .5; 3 SOLUTION RESPIRATORY (INHALATION) at 08:37

## 2021-09-13 RX ADMIN — INSULIN LISPRO 1 UNITS: 100 INJECTION, SOLUTION INTRAVENOUS; SUBCUTANEOUS at 21:13

## 2021-09-13 RX ADMIN — SODIUM CHLORIDE, PRESERVATIVE FREE 10 ML: 5 INJECTION INTRAVENOUS at 21:20

## 2021-09-13 RX ADMIN — METOPROLOL TARTRATE 25 MG: 25 TABLET, FILM COATED ORAL at 08:19

## 2021-09-13 RX ADMIN — ROFLUMILAST 250 MCG: 500 TABLET ORAL at 08:28

## 2021-09-13 RX ADMIN — GUAIFENESIN 600 MG: 600 TABLET, EXTENDED RELEASE ORAL at 12:54

## 2021-09-13 RX ADMIN — CLOPIDOGREL BISULFATE 75 MG: 75 TABLET ORAL at 08:19

## 2021-09-13 RX ADMIN — ATORVASTATIN CALCIUM 20 MG: 10 TABLET, FILM COATED ORAL at 21:15

## 2021-09-13 RX ADMIN — IPRATROPIUM BROMIDE AND ALBUTEROL SULFATE 1 AMPULE: .5; 3 SOLUTION RESPIRATORY (INHALATION) at 11:59

## 2021-09-13 RX ADMIN — POLYETHYLENE GLYCOL 3350 17 G: 17 POWDER, FOR SOLUTION ORAL at 21:20

## 2021-09-13 RX ADMIN — HEPARIN SODIUM 5000 UNITS: 5000 INJECTION INTRAVENOUS; SUBCUTANEOUS at 14:23

## 2021-09-13 RX ADMIN — PANTOPRAZOLE SODIUM 20 MG: 20 TABLET, DELAYED RELEASE ORAL at 08:19

## 2021-09-13 RX ADMIN — HEPARIN SODIUM 5000 UNITS: 5000 INJECTION INTRAVENOUS; SUBCUTANEOUS at 21:15

## 2021-09-13 RX ADMIN — BUSPIRONE HYDROCHLORIDE 5 MG: 5 TABLET ORAL at 21:14

## 2021-09-13 RX ADMIN — SODIUM CHLORIDE, PRESERVATIVE FREE 10 ML: 5 INJECTION INTRAVENOUS at 08:20

## 2021-09-13 RX ADMIN — BUDESONIDE 500 MCG: 0.5 SUSPENSION RESPIRATORY (INHALATION) at 08:38

## 2021-09-13 RX ADMIN — SERTRALINE 50 MG: 50 TABLET, FILM COATED ORAL at 08:19

## 2021-09-13 RX ADMIN — BUSPIRONE HYDROCHLORIDE 5 MG: 5 TABLET ORAL at 08:19

## 2021-09-13 RX ADMIN — GUAIFENESIN 600 MG: 600 TABLET, EXTENDED RELEASE ORAL at 21:14

## 2021-09-13 RX ADMIN — ACETAMINOPHEN 650 MG: 325 TABLET ORAL at 16:35

## 2021-09-13 RX ADMIN — FERROUS SULFATE TAB 325 MG (65 MG ELEMENTAL FE) 325 MG: 325 (65 FE) TAB at 08:19

## 2021-09-13 RX ADMIN — HEPARIN SODIUM 5000 UNITS: 5000 INJECTION INTRAVENOUS; SUBCUTANEOUS at 06:53

## 2021-09-13 RX ADMIN — METOPROLOL TARTRATE 25 MG: 25 TABLET, FILM COATED ORAL at 21:14

## 2021-09-13 RX ADMIN — HYDROXYZINE HYDROCHLORIDE 10 MG: 10 TABLET ORAL at 19:15

## 2021-09-13 RX ADMIN — HYDROXYZINE HYDROCHLORIDE 10 MG: 10 TABLET ORAL at 10:06

## 2021-09-13 RX ADMIN — BUDESONIDE 500 MCG: 0.5 SUSPENSION RESPIRATORY (INHALATION) at 20:37

## 2021-09-13 RX ADMIN — IPRATROPIUM BROMIDE AND ALBUTEROL SULFATE 1 AMPULE: .5; 3 SOLUTION RESPIRATORY (INHALATION) at 20:37

## 2021-09-13 RX ADMIN — FLUTICASONE PROPIONATE 1 SPRAY: 50 SPRAY, METERED NASAL at 08:18

## 2021-09-13 RX ADMIN — Medication 5 MG: at 21:22

## 2021-09-13 RX ADMIN — POLYETHYLENE GLYCOL 3350 17 G: 17 POWDER, FOR SOLUTION ORAL at 08:19

## 2021-09-13 RX ADMIN — BUSPIRONE HYDROCHLORIDE 5 MG: 5 TABLET ORAL at 14:23

## 2021-09-13 ASSESSMENT — PAIN SCALES - GENERAL
PAINLEVEL_OUTOF10: 0
PAINLEVEL_OUTOF10: 4
PAINLEVEL_OUTOF10: 0
PAINLEVEL_OUTOF10: 2

## 2021-09-13 ASSESSMENT — PAIN SCALES - WONG BAKER
WONGBAKER_NUMERICALRESPONSE: 0
WONGBAKER_NUMERICALRESPONSE: 0

## 2021-09-13 NOTE — PROGRESS NOTES
09/13/21 0014   NIV Type   NIV Started/Stopped On   Equipment Type V60   Mode Bilevel   Mask Type Full face mask   Mask Size Small   Settings/Measurements   IPAP 12 cmH20   CPAP/EPAP 6 cmH2O   Rate Ordered 12   Resp 19   FiO2  40 %   Vt Exhaled 452 mL   Minute Volume 11.2 Liters   Mask Leak (lpm) 11 lpm   Comfort Level Good   Using Accessory Muscles No   SpO2 100   Breath Sounds   Right Upper Lobe Expiratory Wheezes   Right Middle Lobe Diminished   Right Lower Lobe Diminished   Left Upper Lobe Expiratory Wheezes   Left Lower Lobe Diminished   Alarm Settings   Alarms On Y

## 2021-09-13 NOTE — PLAN OF CARE
Problem: Falls - Risk of:  Goal: Will remain free from falls  Description: Will remain free from falls  Outcome: Ongoing  Goal: Absence of physical injury  Description: Absence of physical injury  Outcome: Ongoing     Problem: Skin Integrity:  Goal: Will show no infection signs and symptoms  Description: Will show no infection signs and symptoms  Outcome: Ongoing  Goal: Absence of new skin breakdown  Description: Absence of new skin breakdown  Outcome: Ongoing     Problem: Skin Integrity:  Goal: Will show no infection signs and symptoms  Description: Will show no infection signs and symptoms  Outcome: Ongoing

## 2021-09-13 NOTE — PROCEDURES
INSTRUMENTAL SWALLOW REPORT  MODIFIED BARIUM SWALLOW      Recommended Diet:  Solid consistency: Dysphagia Soft and Bite-Sized (Dysphagia III)  Liquid consistency: Mildly Thick (Nectar)  Liquid administration via: Cup;Straw (Small, single sips)  Medication administration: PO  · Although penetration observed with both thin and nectar-thick liquids during study, premature spillage to the pyriforms & increased depth of penetration noted with TL vs NTL. Of note, pt had clinical s/s of aspiration/penetration at bedside with thin liquids prior to MBSS. Coupled with pt's increased WOB with accessory muscle use and reported anxiety, recommend continuing nectar-thick (mildly thick) liquids at this time, trialing thin liquids at bedside with ST.      NAME: Get Garcia   : 1940  MRN: [id number]       Date of Eval: 2021     Ordering Physician: Ashley Mcclain MD  Radiologist: Dr. Annette Post     Referring Diagnosis(es): Referring Diagnosis: Dysphagia    Past Medical History:  has a past medical history of NADJA (acute kidney injury) ([facility] [address] Asthma, COPD (chronic obstructive pulmonary disease) ([address] GERD (gastroesophageal reflux disease), Hyperlipidemia, Hypertension, MRSA (methicillin resistant staph aureus) culture positive, and OA (osteoarthritis). Past Surgical History:  has a past surgical history that includes  section; Mastectomy, partial (Left); bronchoscopy (2019); Cardiac catheterization (2019); Coronary artery bypass graft (N/A, 2019); Colonoscopy (N/A, 2020); Sigmoidoscopy (2020); and sigmoidoscopy (N/A, 2020). Current Diet Solid Consistency: Regular  Current Diet Liquid Consistency: Mildly Thick (Nectar)    Date of Prior Study: 19  Type of Study: Repeat MBS  Results of Prior Study: Regular solid diet with thin liquids recommended, pt encouraged to choose softer foods    Recent CXR (21):   Impression   No pneumonia or edema       Patient Complaints/Reason for Referral:  Get Garcia was referred for a MBS to assess the efficiency of his/her swallow function, assess for aspiration, and to make recommendations regarding safe dietary consistencies, effective compensatory strategies, and safe eating environment. Patient complaints: Per MD H&P, [de-identified] y.o. female who presented to [facility] with 2 days of shortness of breath cough and increased sputum production. Pmhx: COPD on 2L NC, HTN, CAD s/p CABG, TIIDM (A1c 5.2%), GERD     Recent admission 09/03-09/06 for AHRF attributed to COPD exacerbation and PNA. Pt states that two days ago she developed acute shortness of breath with increased cough and yellow sputum production. Since then she has had difficulty breathing which is not her baseline. ROS positive for chills and consitpation otherwise negative for fevers, headache, muscle aches, chest pain, abdominal pain, diarrhea, dysuria, rash. She says she is able to lay down flat and occasionally has lower extremity swelling.     In the ED the pt was noted to be tachypneic (35) afebrile and otherwise stable BP. Labs BMP stable, ProBNP 4500 (previous 3400), WBC 12.8. Rapid covid-19 negative. Pt was given vancomycin, pip-tazo d/t c/f HAP\". General Comment  Comments: Pt admitted d/t elevated troponin, HCAP. Pt has PMHx of GERD and COPD per chart. Behavior/Cognition/Vision/Hearing:  Behavior/Cognition: Alert; Cooperative;Distractible  Vision: Within Functional Limits  Hearing: Within functional limits    Impressions:  Treatment Dx: Dysphagia  · Pt's oral phase deficits characterized by lingual pumping with regular solid trials and pt appeared to fatigue with mastication. · Pt's pharyngeal phase deficits characterized by delayed swallow initiation and reduced airway/laryngeal closure with episodes of penetration during the swallow with both thin and nectar-thick liquids (cup, straw) that partially cleared. No aspiration.   No penetration / aspiration with honey-thick liquid via tsp or solid PO trials. Trace pharyngeal residue noted post-swallow with all PO trials, successfully cleared after further PO trials / as study progressed. · Although penetration observed with both thin and nectar-thick liquids during study, premature spillage to the pyriforms & increased depth of penetration noted with TL vs NTL. Of note, pt had clinical s/s of aspiration/penetration at bedside with thin liquids prior to MBSS. Coupled with pt's increased WOB with accessory muscle use and reported anxiety, recommend continuing nectar-thick (mildly thick) liquids at this time, trialing thin liquids at bedside with ST. Oral Preparation / Oral Phase  Impaired  Oral Phase - Major Contributing Deficits  Lingual Pumping: Reg solid  Premature Bolus Loss to Pharynx: All  Fatigue of Mechanism: Reg solid    Pharyngeal Phase  Impaired  Pharyngeal Phase - Major Contributing Deficits  Delayed Swallow Initiation: All  Premature Spillage to Valleculae: All  Premature Spillage to Pyriform: Thin cup; Thin straw  Reduced Pharyngeal Peristalsis: All  Reduced Airway/laryngeal Closure: All  Shallow Penetration During: Nectar cup;Nectar straw  Parital Self-clearing (shallow): Nectar cup;Nectar straw  Deep Penetration During: Thin straw; Thin cup  Partial Retrieval (deep): Thin cup; Thin straw (Noted episode of deep penetration reaching level of the vocal folds that did not successfully clear despite cued cough)    Esophageal Phase  Appears WFL when viewed at the cervical level throughout the duration of the study       Patient Position: Lateral and Patient Degrees: 90, pt seated upright in MBSS chair    Consistencies Administered: Reg solid; Dysphagia Pureed (Dysphagia I); Nectar cup;Nectar straw; Thin cup; Thin straw;Honey teaspoon    Postural Changes and/or Swallow Maneuvers Trialed:  (Did not attempt strategies / maneuvers during study d/t pt's reported anxiety, stating \"hurry up\" at times during study; noted increased WOB throughout study as well)    Dysphagia Outcome Severity Scale: Level 4: Mild moderate dysphagia- Intermittent supervision/cueing. One - two diet consistencies restricted  Penetration-Aspiration Scale (PAS): 3 - Material enters the airway, remains above the vocal folds, and is not ejected from airway    Recommended Diet:  Solid consistency: Dysphagia Soft and Bite-Sized (Dysphagia III)  Liquid consistency: Mildly Thick (Nectar)  Liquid administration via: Cup;Straw (Small, single sips)  Medication administration: PO    Safe Swallow Protocol:  Supervision: Distant  Compensatory Swallowing Strategies: Upright as possible for all oral intake;Remain upright for 30-45 minutes after meals;Small bites/sips; External pacing; Alternate solids and liquids;Eat/Feed slowly    Recommendations/Treatment  Requires SLP Intervention: Yes  D/C Recommendations: To be determined (per PT/OT recs)    Recommended Exercises:    Therapeutic Interventions: Diet tolerance monitoring;Patient/Family education    Education: Images and recommendations were reviewed with pt and RN following this exam.   Patient Education: Pt educated on MBSS procedure, nature of oropharyngeal deficits, assessment results and recommendations. Patient Education Response: Verbalizes understanding;Needs reinforcement    Prognosis  Prognosis for safe diet advancement: fair  Barriers to reach goals: age;other (comment)  Barriers/Prognosis Comment: respiratory status  Duration/Frequency of Treatment  Duration/Frequency of Treatment: 3-5x/week for LOS  Safety Devices  Safety Devices in place: Not Applicable      Goals:    Long Term:   Timeframe for Long-term Goals: 7 days (9/17/21)  Goal 1: Pt will tolerate recommended diet w/o exhibiting overt s/s dysphagia. Short Term:  Dysphagia Goals: The patient will tolerate recommended diet without observed clinical signs of aspiration; The patient/caregiver will demonstrate understanding of

## 2021-09-13 NOTE — PLAN OF CARE
Problem: Falls - Risk of:  Goal: Will remain free from falls  Description: Will remain free from falls  9/12/2021 1527 by Patricio Fleischer, RN  Outcome: Ongoing  Goal: Absence of physical injury  Description: Absence of physical injury  9/12/2021 1527 by Patricio Fleischer, RN  Outcome: Ongoing

## 2021-09-13 NOTE — PROGRESS NOTES
Speech Language Pathology  MBSS    MBSS completed -- recommend downgrade to Dysphagia III (soft and bite sized) diet, continue nectar-thick (mildly thick) liquids, *small, single sips, meds whole with NTL or puree, strict aspiration precautions. RN notified of recs. Full report to follow.     Kristin Lara M.S. Formerly [facility]  Speech-language pathologist  [phone]  Speech Desk Phone: [phone]

## 2021-09-13 NOTE — PROGRESS NOTES
Physician Progress Note      PATIENT:               Mg Moreau  CSN #:                  [id number]  :                       1940  ADMIT DATE:       2021 1:05 AM  DISCH DATE:  RESPONDING  PROVIDER #:        Kassidy Brown          QUERY TEXT:    H&P notes-\" Acute Respiratory distress from COPD exacerbation\"   ED consult   notes- Acute respiratory failure with hypoxia ([address] If possible, please   document in progress notes and discharge summary:      The medical record reflects the following:  Risk Factors: COPD, ?pna, hx of CHF  Clinical Indicators: resp 40, 4l oxygen per flowsheet and Bipap OA, ED-    \"Scattered rhonchi and occasional rales bilaterally, tachypneic-returns to the   ER for increasing shortness of breath\"   H&P-\"In respiratory distress with   clavicular retractions  Respiratory:  Increased work of breathing, very course breath sound   bilaterally though sound like they are coming mostly from upper airway,   increased sputum on exam, pursed lips breathing with prolonged expiratory   phase and wheezing. Breath sound heard bilaterally. \"  Treatment: \"now on BIPAP\", 24 hr pulse oximetry, furosemide 20mg IV x1-   Duonebs q4hr, acapella/air way clearence with RT, continue home nebulized   ICS/spiriva/roflumilast- start BIPAP, obtain VBG- f/u D dimer    Thank-You, Ameena Salomon RN, BSN, CCDS  Options provided:  -- Acute respiratory failure with hypoxia confirmed present on admission  -- Acute respiratory failure with hypoxia  ruled out  -- Other - I will add my own diagnosis  -- Disagree - Not applicable / Not valid  -- Disagree - Clinically unable to determine / Unknown  -- Refer to Clinical Documentation Reviewer    PROVIDER RESPONSE TEXT:    The diagnosis of Acute respiratory failure with hypoxia was confirmed as   present on admission.     Query created by: Val Raza on 9/10/2021 10:46 AM      QUERY TEXT:    H&P notes- \" I suspect a slight component of pulmonary edema in the setting of   course crackles, increased proximal infiltrates and increased pro-BNP. Will   diuresis x1 and reassess for restarting home diuretic\". If possible, please   document in progress notes and discharge summary if you are evaluating and/or   treating any of the following: The medical record reflects the following:  Risk Factors: per hx combine CHF  Clinical Indicators: ProBNP- 4578 OA, mild pulmonary edema- H&P- \"ProBNP 4500   (previous 3400\"  per cards hx- \"combined systolic and diastolic congestive   heart failure (HCC\"   CXR- \"mild residual pulmonary edema or improving   infectious/inflammatory process.  \"  Treatment: IV Lasix x1, resume home dose of Lasix, I&O's, CXR, daily wts,   supportive care , serial labs, ProBNP    Thank-You, Ameena Salomon RN, BSN, CCDS  Options provided:  -- [[Acute on Chronic Systolic and Diastolic CHF POA  -- [[Chronic Systolic and Diastolic CHF  -- Other - I will add my own diagnosis  -- Disagree - Not applicable / Not valid  -- Disagree - Clinically unable to determine / Unknown  -- Refer to Clinical Documentation Reviewer    PROVIDER RESPONSE TEXT:    Crackles were due to copd exacerbation not volume overload    Query created by: Jesus Alba on 9/10/2021 10:57 AM      Electronically signed by:  Mary Carpenter 9/12/2021 10:37 PM

## 2021-09-13 NOTE — CARE COORDINATION
9/13/21 [facility] has accepted but when need PT/OT orders and recs when medically appropriate and  prior to d/c. Pt will need rapid Covid on day of d/c, currently on NIV, palliative care consult.

## 2021-09-13 NOTE — PROGRESS NOTES
Hospitalist Progress Note      PCP: Michela Nolan MD    Date of Admission: 9/9/2021    Chief Complaint:sob, cough      Hospital Course: [de-identified] yoF with history of tobacco use and severe COPD on 2L NC with recurrent exacerbations this year who presents with increased sob, cough and productive sputum with exam notable for respiratory distress likely from COPD exacerbation. Improving with BIPAP but hospital course c/b worsening anxiety.     Subjective: notes postnasal drip and difficulty expectorating, ongoing breathing tx , son in law at bedside      Medications:  Reviewed    Infusion Medications    sodium chloride      dextrose       Scheduled Medications    guaiFENesin  600 mg Oral BID    polyethylene glycol  17 g Oral BID    busPIRone  5 mg Oral TID    heparin (porcine)  5,000 Units SubCUTAneous 3 times per day    fluticasone  1 spray Each Nostril Daily    atorvastatin  20 mg Oral Nightly    budesonide  0.5 mg Nebulization BID    clopidogrel  75 mg Oral Daily    ferrous sulfate  325 mg Oral Daily with breakfast    metoprolol tartrate  25 mg Oral BID    pantoprazole  20 mg Oral Daily    Roflumilast  250 mcg Oral Daily    sertraline  50 mg Oral Daily    [Held by provider] tiotropium  2 puff Inhalation Daily    sodium chloride flush  10 mL IntraVENous 2 times per day    ipratropium-albuterol  1 ampule Inhalation Q4H WA    insulin lispro  0-6 Units SubCUTAneous TID WC    insulin lispro  0-3 Units SubCUTAneous Nightly     PRN Meds: sodium chloride, hydrOXYzine, bisacodyl, morphine, albuterol sulfate HFA, sodium chloride flush, sodium chloride, ondansetron **OR** ondansetron, acetaminophen **OR** acetaminophen, glucose, dextrose, glucagon (rDNA), dextrose      Intake/Output Summary (Last 24 hours) at 9/13/2021 1207  Last data filed at 9/12/2021 2019  Gross per 24 hour   Intake    Output 500 ml   Net -500 ml       Physical Exam Performed:    /74   Pulse 95   Temp 98 °F (36.7 °C) (Oral) pneumonia or edema         VL Extremity Venous Bilateral   Final Result      NM LUNG VENT/PERFUSION (VQ)   Final Result   Very low probability for pulmonary embolism. XR CHEST PORTABLE   Final Result   Right basilar atelectasis, scarring or airspace disease. XR CHEST PORTABLE   Final Result   Mild bibasilar heterogeneous opacities are improved from the prior study and   could relate to subsegmental atelectasis/scarring, mild residual pulmonary   edema or improving infectious/inflammatory process. No new consolidation. Follow-up PA and lateral chest radiographs in 6 weeks may be helpful for   further evaluation. Hyperexpanded lung volume can be seen in the setting of COPD. Assessment/Plan:    Active Hospital Problems    Diagnosis     Acute respiratory distress [R06.03]     Volume overload [E87.70]     Demand ischemia (HCC) [I24.8]     GERD (gastroesophageal reflux disease) [K21.9]     Essential hypertension [I10]     CAD (coronary artery disease) [I25.10]     COPD exacerbation (HCC) [J44.1]             Acute Respiratory distress from COPD exacerbation (Gold staging (III) Severe class D). Precipitant unclear. D dimer elevated but under the age adjusted cut off (800). lower extremity dopplers w/o evidence of DVT and V/Q scan with low probability risk for PE. Echo 08/29 nrl EF with elevated diastolic filling pressures. VBG appear consistent with chronic respiratory acidosis with metabolic compensation. FEV1-30% (2016)  - Prednisone 40 (end date 09/13/2021)  - azithromycin 500mg (09/11/2021)  - Duonebs q4hr, acapella/air way clearence with RT, continue home nebulized ICS/spiriva/roflumilast  - BIPAP nightly and PRN daily  - continue home buspar and started PRN morphine d/t end stage COPD, ongoing attempt at [address].    - palliative consulted, appreciate help     Type 2 Myocardial injury from demand ischemia in the setting of underlying CAD  - resolved.     Acute renal failure- Creatinine peaked 2.0. was 1.2 on admission. Baseline ~1.0. Suspect prerenal in setting of 24 hours without PO intake, s/p lasix. Urine lytes reflect prerenal etiology. Improving with IVF. - encourage PO intake  - Gentle IVF as needed     CAD/HTN- continued home BB, Clopidogrel, atorvastatin     Mood disorder continued home sertraline     GERD- continued home PPI     Underlying dementiadelirium- pt non focal exam. At baseline  - unable to reach [facility] for collateral information  - called family and spoke with daughter. Says pt does have decreased mobility from her previous Stroke. Quite bed bound. Has episodes of delirium when having COPD exacerbation but at baseline can be quite forgetful. Daughter confirmed pts code status and wishes. ACP note documented.        GOC-readdressed on  09/12. No change     DVT Prophylaxis: heparin  q8h d/t renal function  Diet: ADULT DIET;  Dysphagia - Soft and Bite Sized; Mildly Thick ([place]ectar)  Code Status: Full Code    PT/OT Eval Status: not yet ordered    Dispo - add mucinex, pending stable resp status, 2-3 days    Tammy Rodriguez MD

## 2021-09-13 NOTE — PROGRESS NOTES
09/13/21 0422   NIV Type   NIV Started/Stopped On   Equipment Type v60   Mode Bilevel   Mask Type Full face mask   Mask Size Small   Settings/Measurements   IPAP 12 cmH20   CPAP/EPAP 6 cmH2O   Rate Ordered 12   Resp 22   FiO2  40 %   Vt Exhaled 389 mL   Mask Leak (lpm) 18 lpm   Comfort Level Good   Using Accessory Muscles No

## 2021-09-14 VITALS
HEART RATE: 88 BPM | TEMPERATURE: 98 F | SYSTOLIC BLOOD PRESSURE: 121 MMHG | HEIGHT: 62 IN | RESPIRATION RATE: 16 BRPM | WEIGHT: 109.13 LBS | DIASTOLIC BLOOD PRESSURE: 69 MMHG | OXYGEN SATURATION: 98 % | BODY MASS INDEX: 20.08 KG/M2

## 2021-09-14 LAB
ANION GAP SERPL CALCULATED.3IONS-SCNC: 7 MMOL/L (ref 3–16)
BASE EXCESS ARTERIAL: 7 MMOL/L (ref -3–3)
BASOPHILS ABSOLUTE: 0 K/UL (ref 0–0.2)
BASOPHILS RELATIVE PERCENT: 0.1 %
BUN BLDV-MCNC: 18 MG/DL (ref 7–20)
CALCIUM SERPL-MCNC: 9.3 MG/DL (ref 8.3–10.6)
CARBOXYHEMOGLOBIN ARTERIAL: 0.3 % (ref 0–1.5)
CHLORIDE BLD-SCNC: 106 MMOL/L (ref 99–110)
CO2: 33 MMOL/L (ref 21–32)
CREAT SERPL-MCNC: 1.2 MG/DL (ref 0.6–1.2)
EOSINOPHILS ABSOLUTE: 0 K/UL (ref 0–0.6)
EOSINOPHILS RELATIVE PERCENT: 1 %
GFR AFRICAN AMERICAN: 52
GFR NON-AFRICAN AMERICAN: 43
GLUCOSE BLD-MCNC: 111 MG/DL (ref 70–99)
GLUCOSE BLD-MCNC: 113 MG/DL (ref 70–99)
GLUCOSE BLD-MCNC: 118 MG/DL (ref 70–99)
GLUCOSE BLD-MCNC: 92 MG/DL (ref 70–99)
GLUCOSE BLD-MCNC: 94 MG/DL (ref 70–99)
HCO3 ARTERIAL: 32.4 MMOL/L (ref 21–29)
HCT VFR BLD CALC: 22.7 % (ref 36–48)
HEMOGLOBIN, ART, EXTENDED: 8.4 G/DL (ref 12–16)
HEMOGLOBIN: 7.5 G/DL (ref 12–16)
LYMPHOCYTES ABSOLUTE: 1.3 K/UL (ref 1–5.1)
LYMPHOCYTES RELATIVE PERCENT: 29.7 %
MCH RBC QN AUTO: 30.6 PG (ref 26–34)
MCHC RBC AUTO-ENTMCNC: 32.9 G/DL (ref 31–36)
MCV RBC AUTO: 92.9 FL (ref 80–100)
METHEMOGLOBIN ARTERIAL: 1 %
MONOCYTES ABSOLUTE: 0.3 K/UL (ref 0–1.3)
MONOCYTES RELATIVE PERCENT: 6.2 %
NEUTROPHILS ABSOLUTE: 2.9 K/UL (ref 1.7–7.7)
NEUTROPHILS RELATIVE PERCENT: 63 %
O2 SAT, ARTERIAL: 97.3 %
O2 THERAPY: ABNORMAL
PCO2 ARTERIAL: 51.3 MMHG (ref 35–45)
PDW BLD-RTO: 17.8 % (ref 12.4–15.4)
PERFORMED ON: ABNORMAL
PERFORMED ON: NORMAL
PH ARTERIAL: 7.42 (ref 7.35–7.45)
PLATELET # BLD: 258 K/UL (ref 135–450)
PMV BLD AUTO: 8 FL (ref 5–10.5)
PO2 ARTERIAL: 102.8 MMHG (ref 75–108)
POTASSIUM REFLEX MAGNESIUM: 4.3 MMOL/L (ref 3.5–5.1)
RBC # BLD: 2.44 M/UL (ref 4–5.2)
SARS-COV-2, NAAT: NOT DETECTED
SODIUM BLD-SCNC: 146 MMOL/L (ref 136–145)
TCO2 ARTERIAL: 34 MMOL/L
WBC # BLD: 4.5 K/UL (ref 4–11)

## 2021-09-14 PROCEDURE — 2700000000 HC OXYGEN THERAPY PER DAY

## 2021-09-14 PROCEDURE — 97530 THERAPEUTIC ACTIVITIES: CPT

## 2021-09-14 PROCEDURE — 6360000002 HC RX W HCPCS: Performed by: STUDENT IN AN ORGANIZED HEALTH CARE EDUCATION/TRAINING PROGRAM

## 2021-09-14 PROCEDURE — 94640 AIRWAY INHALATION TREATMENT: CPT

## 2021-09-14 PROCEDURE — 2580000003 HC RX 258: Performed by: INTERNAL MEDICINE

## 2021-09-14 PROCEDURE — 97166 OT EVAL MOD COMPLEX 45 MIN: CPT

## 2021-09-14 PROCEDURE — 6370000000 HC RX 637 (ALT 250 FOR IP): Performed by: INTERNAL MEDICINE

## 2021-09-14 PROCEDURE — 94761 N-INVAS EAR/PLS OXIMETRY MLT: CPT

## 2021-09-14 PROCEDURE — 36600 WITHDRAWAL OF ARTERIAL BLOOD: CPT

## 2021-09-14 PROCEDURE — 97162 PT EVAL MOD COMPLEX 30 MIN: CPT

## 2021-09-14 PROCEDURE — 82803 BLOOD GASES ANY COMBINATION: CPT

## 2021-09-14 PROCEDURE — 94660 CPAP INITIATION&MGMT: CPT

## 2021-09-14 PROCEDURE — 97535 SELF CARE MNGMENT TRAINING: CPT

## 2021-09-14 PROCEDURE — 99222 1ST HOSP IP/OBS MODERATE 55: CPT | Performed by: INTERNAL MEDICINE

## 2021-09-14 PROCEDURE — 94669 MECHANICAL CHEST WALL OSCILL: CPT

## 2021-09-14 PROCEDURE — 80048 BASIC METABOLIC PNL TOTAL CA: CPT

## 2021-09-14 PROCEDURE — 36415 COLL VENOUS BLD VENIPUNCTURE: CPT

## 2021-09-14 PROCEDURE — 85025 COMPLETE CBC W/AUTO DIFF WBC: CPT

## 2021-09-14 PROCEDURE — 6360000002 HC RX W HCPCS: Performed by: INTERNAL MEDICINE

## 2021-09-14 PROCEDURE — 6370000000 HC RX 637 (ALT 250 FOR IP): Performed by: STUDENT IN AN ORGANIZED HEALTH CARE EDUCATION/TRAINING PROGRAM

## 2021-09-14 PROCEDURE — 97116 GAIT TRAINING THERAPY: CPT

## 2021-09-14 PROCEDURE — 87635 SARS-COV-2 COVID-19 AMP PRB: CPT

## 2021-09-14 RX ORDER — GUAIFENESIN 600 MG/1
600 TABLET, EXTENDED RELEASE ORAL 2 TIMES DAILY
Qty: 6 TABLET | Refills: 0 | Status: ON HOLD
Start: 2021-09-14 | End: 2021-09-23 | Stop reason: HOSPADM

## 2021-09-14 RX ORDER — POLYETHYLENE GLYCOL 3350 17 G/17G
17 POWDER, FOR SOLUTION ORAL 2 TIMES DAILY
Qty: 527 G | Refills: 0 | Status: ON HOLD
Start: 2021-09-14 | End: 2021-10-22 | Stop reason: ALTCHOICE

## 2021-09-14 RX ORDER — MECOBALAMIN 5000 MCG
5 TABLET,DISINTEGRATING ORAL NIGHTLY PRN
Status: ON HOLD | COMMUNITY
Start: 2021-09-14 | End: 2022-03-30

## 2021-09-14 RX ORDER — FLUTICASONE PROPIONATE 50 MCG
1 SPRAY, SUSPENSION (ML) NASAL DAILY PRN
Qty: 16 G | Refills: 0
Start: 2021-09-14

## 2021-09-14 RX ORDER — BISACODYL 10 MG
10 SUPPOSITORY, RECTAL RECTAL DAILY PRN
Status: ON HOLD | COMMUNITY
Start: 2021-09-14 | End: 2021-09-23 | Stop reason: HOSPADM

## 2021-09-14 RX ADMIN — FLUTICASONE PROPIONATE 1 SPRAY: 50 SPRAY, METERED NASAL at 08:52

## 2021-09-14 RX ADMIN — BUSPIRONE HYDROCHLORIDE 5 MG: 5 TABLET ORAL at 13:55

## 2021-09-14 RX ADMIN — IPRATROPIUM BROMIDE AND ALBUTEROL SULFATE 1 AMPULE: .5; 3 SOLUTION RESPIRATORY (INHALATION) at 20:16

## 2021-09-14 RX ADMIN — ROFLUMILAST 250 MCG: 500 TABLET ORAL at 08:49

## 2021-09-14 RX ADMIN — BUDESONIDE 500 MCG: 0.5 SUSPENSION RESPIRATORY (INHALATION) at 08:35

## 2021-09-14 RX ADMIN — IPRATROPIUM BROMIDE AND ALBUTEROL SULFATE 1 AMPULE: .5; 3 SOLUTION RESPIRATORY (INHALATION) at 15:51

## 2021-09-14 RX ADMIN — HEPARIN SODIUM 5000 UNITS: 5000 INJECTION INTRAVENOUS; SUBCUTANEOUS at 06:50

## 2021-09-14 RX ADMIN — BUSPIRONE HYDROCHLORIDE 5 MG: 5 TABLET ORAL at 20:37

## 2021-09-14 RX ADMIN — CLOPIDOGREL BISULFATE 75 MG: 75 TABLET ORAL at 08:50

## 2021-09-14 RX ADMIN — SERTRALINE 50 MG: 50 TABLET, FILM COATED ORAL at 08:50

## 2021-09-14 RX ADMIN — PANTOPRAZOLE SODIUM 20 MG: 20 TABLET, DELAYED RELEASE ORAL at 08:49

## 2021-09-14 RX ADMIN — IPRATROPIUM BROMIDE AND ALBUTEROL SULFATE 1 AMPULE: .5; 3 SOLUTION RESPIRATORY (INHALATION) at 08:35

## 2021-09-14 RX ADMIN — IPRATROPIUM BROMIDE AND ALBUTEROL SULFATE 1 AMPULE: .5; 3 SOLUTION RESPIRATORY (INHALATION) at 12:09

## 2021-09-14 RX ADMIN — FERROUS SULFATE TAB 325 MG (65 MG ELEMENTAL FE) 325 MG: 325 (65 FE) TAB at 08:50

## 2021-09-14 RX ADMIN — SODIUM CHLORIDE, PRESERVATIVE FREE 10 ML: 5 INJECTION INTRAVENOUS at 08:52

## 2021-09-14 RX ADMIN — METOPROLOL TARTRATE 25 MG: 25 TABLET, FILM COATED ORAL at 08:50

## 2021-09-14 RX ADMIN — HEPARIN SODIUM 5000 UNITS: 5000 INJECTION INTRAVENOUS; SUBCUTANEOUS at 13:56

## 2021-09-14 RX ADMIN — GUAIFENESIN 600 MG: 600 TABLET, EXTENDED RELEASE ORAL at 08:49

## 2021-09-14 RX ADMIN — ONDANSETRON 4 MG: 4 TABLET, ORALLY DISINTEGRATING ORAL at 10:24

## 2021-09-14 RX ADMIN — HYDROXYZINE HYDROCHLORIDE 10 MG: 10 TABLET ORAL at 13:55

## 2021-09-14 RX ADMIN — BUDESONIDE 500 MCG: 0.5 SUSPENSION RESPIRATORY (INHALATION) at 20:16

## 2021-09-14 RX ADMIN — HYDROXYZINE HYDROCHLORIDE 10 MG: 10 TABLET ORAL at 20:37

## 2021-09-14 RX ADMIN — BUSPIRONE HYDROCHLORIDE 5 MG: 5 TABLET ORAL at 08:50

## 2021-09-14 RX ADMIN — METOPROLOL TARTRATE 25 MG: 25 TABLET, FILM COATED ORAL at 20:37

## 2021-09-14 RX ADMIN — GUAIFENESIN 600 MG: 600 TABLET, EXTENDED RELEASE ORAL at 20:37

## 2021-09-14 ASSESSMENT — PAIN SCALES - GENERAL
PAINLEVEL_OUTOF10: 0
PAINLEVEL_OUTOF10: 0

## 2021-09-14 ASSESSMENT — PAIN SCALES - WONG BAKER
WONGBAKER_NUMERICALRESPONSE: 0

## 2021-09-14 ASSESSMENT — ENCOUNTER SYMPTOMS
ALLERGIC/IMMUNOLOGIC NEGATIVE: 1
SHORTNESS OF BREATH: 1
GASTROINTESTINAL NEGATIVE: 1
COUGH: 1
EYES NEGATIVE: 1

## 2021-09-14 NOTE — PROGRESS NOTES
09/14/21 1649   Oxygen Therapy/Pulse Ox   Blood Gas  Performed? Yes   $ABG $ABG   Christian's Test #1 Pos   Site #1 Right Radial   Site Prepped #1 Yes   Number of Attempts #1 1   Pressure Held #1 Yes   Complications #1 None   Post-procedure #1 Standard   Specimen Status #1 To lab   How Tolerated?  Tolerated well

## 2021-09-14 NOTE — PLAN OF CARE
Problem: Falls - Risk of:  Goal: Will remain free from falls  Description: Will remain free from falls  9/14/2021 1458 by Amy Levy RN  Outcome: Ongoing

## 2021-09-14 NOTE — PROGRESS NOTES
Spoke with Dr. Brandi Motta and explained to him that the pt is set up with BIPAP at the facility and is set up for the sleep study for when she leaves the facility to get the bipap at home. ABG will be drawn prior to her discharging per facility request so that she can have the bipap at the facility. Ashtyn  verified all of this information. Dr. Dayanara Gaxiola notified of this information.

## 2021-09-14 NOTE — PROGRESS NOTES
09/14/21 0343   NIV Type   Skin Protection for O2 Device Yes   Location Nose   Equipment Type v60   Mode Bilevel   Mask Type Full face mask   Mask Size Small   Settings/Measurements   IPAP 12 cmH20   CPAP/EPAP 6 cmH2O   Rate Ordered 12   Resp 16   FiO2  40 %  (decreased to 35%)   Vt Exhaled 424 mL   Minute Volume 7.4 Liters   Comfort Level Good   Using Accessory Muscles No   SpO2 100   Alarm Settings   Alarms On Y   Press Low Alarm 6 cmH2O   High Pressure Alarm 30 cmH2O   Apnea (secs) 20 secs   Resp Rate Low Alarm 6   High Respiratory Rate 40 br/min

## 2021-09-14 NOTE — CARE COORDINATION
CASE MANAGEMENT DISCHARGE SUMMARY      Discharge to: [facility], SNF. Precertification completed: N/A  Hospital Exemption Notification (HENS) completed: N/A pt admitted from [facility] and [facility] with pull off of COA. IMM given: today    New Durable Medical Equipment ordered/agency: BiPAP    Transportation:    Family/car: squad   Medical Transport explained to [employer]. Pt/family voice no agency preference. Agency used: [facility]    time: 2015   Ambulance form completed: Yes    Confirmed discharge plan with: Met with pt and niece at bedside and both in agreement with SNF and [facility] at DC. Spoke to [facility] and they are able to accept pt today and are able to provide BiPAP tonight. [facility] will arrange a sleep study to be done when discharged, ABG noted to be done today prior to dc. Updated pt/family on BiPAP. Referral placed to [facility], spoke to Jazlyn Churchill to arrange with [facility] for home BiPAP. Patient: yes     Facility/Agency, name:  [facility] faxed-yes   Phone number for report to facility: [phone]     RN, name: Cristi Raman    Note: Discharging nurse to complete [facility], reconcile [facility], and place final copy with patient's discharge packet. RN to ensure that written prescriptions for  Level II medications are sent with patient to the facility as per protocol.

## 2021-09-14 NOTE — PLAN OF CARE
Problem: Falls - Risk of:  Goal: Will remain free from falls  Description: Will remain free from falls  9/14/2021 0521 by Angeline De La Garza RN  Outcome: Ongoing  9/13/2021 1536 by Sue Yung RN  Outcome: Ongoing  Goal: Absence of physical injury  Description: Absence of physical injury  9/14/2021 0521 by Angeline De La Garza RN  Outcome: Ongoing  9/13/2021 1536 by Sue Yung RN  Outcome: Ongoing

## 2021-09-14 NOTE — PROGRESS NOTES
hypoxia ([facility] [address] Elevated troponin, and Elevated brain natriuretic peptide (BNP) level were also pertinent to this visit. has a past medical history of NADJA (acute kidney injury) ([facility] [address] Asthma, COPD (chronic obstructive pulmonary disease) ([facility] [address] GERD (gastroesophageal reflux disease), Hyperlipidemia, Hypertension, MRSA (methicillin resistant staph aureus) culture positive, and OA (osteoarthritis). has a past surgical history that includes  section; Mastectomy, partial (Left); bronchoscopy (2019); Cardiac catheterization (2019); Coronary artery bypass graft (N/A, 2019); Colonoscopy (N/A, 2020); Sigmoidoscopy (2020); and sigmoidoscopy (N/A, 2020). Restrictions  Restrictions/Precautions  Restrictions/Precautions: General Precautions, Fall Risk, Up as Tolerated    Subjective   General  Chart Reviewed: Yes, Orders  Patient assessed for rehabilitation services?: Yes  Family / Caregiver Present: Yes (jerardo)  Referring Practitioner: Reed Babcock 21  Diagnosis: Acute Respiratory distress from COPD exacerbation  Subjective  Subjective: Pt pleasant and agreeable to OT evaluation.  \"I don't think I can do 3 hours of therapy downstairs\"  Patient Currently in Pain: No  Pain Assessment  Burt-Munoz Pain Rating: No hurt  Response to Pain Intervention: Patient Satisfied  Vital Signs  Temp: 97.8 °F (36.6 °C)  Temp Source: Oral  Pulse: 97  Heart Rate Source: Monitor  Resp: 18  BP: (!) 141/70  BP Location: Left upper arm  MAP (mmHg): 94  Patient Position: Semi fowlers  Patient Currently in Pain: No  Oxygen Therapy  SpO2: 97 %  O2 Device: Nasal cannula  O2 Flow Rate (L/min): 3 L/min  Social/Functional History  Social/Functional History  Lives With: Son (works from home)  Type of Home: House  Home Layout: One level  Home Access: Stairs to enter without rails  Entrance Stairs - Number of Steps: 1 javier  Bathroom Shower/Tub: Tub/Shower unit  Bathroom Toilet: Standard  Bathroom Equipment: Grab bars in shower  Home Equipment: [employer], [address] bed, Lift chair, Alert Button, Oxygen, Standard walker (O2 2L continously)  Receives Help From: Family  ADL Assistance: Needs assistance  Bath: Moderate assistance (for time management)  Dressing: Independent  Grooming: Independent  Feeding: Independent  Toileting: Independent  Homemaking Responsibilities: Yes  Meal Prep Responsibility: Secondary  Laundry Responsibility: Secondary  Cleaning Responsibility: Secondary  Ambulation Assistance: Independent (w/o device)  Transfer Assistance: Independent  Active : No  Occupation: Retired       Objective        Orientation  Overall Orientation Status: Within Functional Limits     Balance  Sitting Balance: Stand by assistance  Standing Balance: Minimal assistance (with RW)  Functional Mobility  Functional - Mobility Device: Rolling Walker  Activity: Other (3ft)  Assist Level: Minimal assistance  ADL  Feeding: Setup  Grooming: Contact guard assistance  LE Dressing:  Moderate assistance (socks)  Toileting: Dependent/Total (bond)  Tone RUE  RUE Tone: Normotonic  Tone LUE  LUE Tone: Normotonic  Coordination  Movements Are Fluid And Coordinated: Yes     Bed mobility  Supine to Sit: Minimal assistance  Sit to Supine: Unable to assess (up to chair at end of session)  Transfers  Sit to stand: Minimal assistance (up to RW with cues for safe hand placement)  Stand to sit: Minimal assistance     Cognition  Overall Cognitive Status: WFL        Sensation  Overall Sensation Status: WFL        LUE AROM (degrees)  LUE AROM : WFL  RUE AROM (degrees)  RUE AROM : WFL  LUE Strength  LUE Strength Comment: 4-/5 grossly  RUE Strength  RUE Strength Comment: 4-/5 grossly                   Plan   Plan  Times per week: 3-5x's a week while in acute care      AM-PAC Score        AM-[facility] Inpatient Daily Activity Raw Score: 15 (09/14/21 1525)  AM-PAC Inpatient ADL T-Scale Score : 34.69 (09/14/21 1525)  ADL Inpatient CMS 0-100% Score: 56.46 (09/14/21 1525)  ADL Inpatient CMS G-Code Modifier : CK (09/14/21 1525)    Goals  Short term goals  Time Frame for Short term goals: 1 week unless otherwise specified 9/21  Short term goal 1: Pt will complete toilet transfers with CGA by 9/18  Short term goal 2: Pt will complete LE dressing with Aimee  Patient Goals   Patient goals : \"to get stronger and go home\"       Therapy Time   Individual Concurrent Group Co-treatment   Time In 1317         Time Out 1355         Minutes 38         Timed Code Treatment Minutes: 28 Minutes       Yadira Erwin OTR/L  If pt is unable to be seen after this session, please let this note serve as discharge summary. Please see case management note for discharge disposition. Thank you.

## 2021-09-14 NOTE — PROGRESS NOTES
09/14/21 0030   NIV Type   $NIV $Daily Charge   Skin Protection for O2 Device Yes   Location Nose   Equipment Type v60   Mode Bilevel   Mask Type Full face mask   Mask Size Small   Settings/Measurements   IPAP 12 cmH20   CPAP/EPAP 6 cmH2O   Rate Ordered 12   Resp 28   FiO2  40 %   Vt Exhaled 411 mL   Minute Volume 10.6 Liters   Mask Leak (lpm) 5 lpm   Comfort Level Good   Using Accessory Muscles No   SpO2 100   Alarm Settings   Alarms On Y   Press Low Alarm 6 cmH2O   High Pressure Alarm 30 cmH2O   Apnea (secs) 20 secs   Resp Rate Low Alarm 6   High Respiratory Rate 40 br/min

## 2021-09-14 NOTE — DISCHARGE SUMMARY
Hospital Medicine Discharge Summary    Patient ID: Camille Garcia      Patient's PCP: Vita Huerta MD    Admit Date: 9/9/2021     Discharge Date: 9/14/2021      Admitting Physician: Bjorn Gamino MD     Discharge Physician: Cade Castelan MD     Discharge Diagnoses: Active Hospital Problems    Diagnosis     Acute respiratory distress [R06.03]     Volume overload [E87.70]     Demand ischemia (HCC) [I24.8]     GERD (gastroesophageal reflux disease) [K21.9]     Essential hypertension [I10]     CAD (coronary artery disease) [I25.10]     COPD exacerbation (HCC) [J44.1]        The patient was seen and examined on day of discharge and this discharge summary is in conjunction with any daily progress note from day of discharge. Hospital Course:   History Of Present Illness:    [de-identified] y.o. female who presented to [facility] with 2 days of shortness of breath cough and increased sputum production. Pmhx: COPD on 2L NC, HTN, CAD s/p CABG, TIIDM (A1c 5.2%), GERD     Recent admission 09/03-09/06 for AHRF attributed to COPD exacerbation and PNA. Pt states that two days ago she developed acute shortness of breath with increased cough and yellow sputum production. Since then she has had difficulty breathing which is not her baseline. ROS positive for chills and consitpation otherwise negative for fevers, headache, muscle aches, chest pain, abdominal pain, diarrhea, dysuria, rash. She says she is able to lay down flat and occasionally has lower extremity swelling.     In the ED the pt was noted to be tachypneic (35) afebrile and otherwise stable BP. Labs BMP stable, ProBNP 4500 (previous 3400), WBC 12.8. Rapid covid-19 negative. Pt was given vancomycin, pip-tazo d/t c/f HAP. Acute Respiratory distress from COPD exacerbation (Gold staging (III) Severe class D). Precipitant unclear. D dimer elevated but under the age adjusted cut off (800).   lower extremity dopplers w/o evidence of DVT and V/Q scan with low probability risk for PE. Echo 08/29 nrl EF with elevated diastolic filling pressures. VBG appear consistent with chronic respiratory acidosis with metabolic compensation. FEV1-30% (2016)  - Prednisone 40 (end date was 09/13/2021)  - azithromycin 500mg (09/11/2021)  - Duonebs q4hr, acapella/air way clearence with RT, continue home nebulized ICS/spiriva/roflumilast  - BIPAP nightly and PRN daily  - continue home buspar and started PRN morphine d/t end stage COPD, ongoing attempt at [address].    - palliative consulted, appreciate help   -pulm consulted(pt/family requested switch to [address] given proxmity to residence), will arrange outpt sleep study via SNF    Type 2 Myocardial injury from demand ischemia in the setting of underlying CAD  - resolved.     Acute renal failure- Creatinine peaked 2.0. was 1.2 on admission. Baseline ~1.0. Suspect prerenal in setting of 24 hours without PO intake, s/p lasix. Urine lytes reflect prerenal etiology. Improving with IVF. - encourage PO intake  - Gentle IVF given as needed     CAD/HTN- continued home BB, Clopidogrel, atorvastatin     Mood disorder continued home sertraline     GERD- continued home PPI     Underlying dementiadelirium- pt non focal exam. At baseline  - unable to reach [facility] for collateral information  - called family and spoke with daughter. Says pt does have decreased mobility from her previous Stroke. Quite bed bound. Has episodes of delirium when having COPD exacerbation but at baseline can be quite forgetful. Daughter confirmed pts code status and wishes. ACP note documented.        GOC-readdressed on  09/12. No change    Physical Exam Performed:     /69   Pulse 88   Temp 98 °F (36.7 °C) (Oral)   Resp 16   Ht 5' 2\" (1.575 m)   Wt 109 lb 2 oz (49.5 kg)   SpO2 98%   BMI 19.96 kg/m²       General appearance:  No apparent distress, appears stated age and cooperative.   HEENT:  Normal cephalic, atraumatic without obvious deformity. Pupils equal, round, and reactive to light. Extra ocular muscles intact. Conjunctivae/corneas clear. Neck: Supple, with full range of motion. No jugular venous distention. Trachea midline. Respiratory:  Normal respiratory effort. Clear to auscultation, bilaterally without Rales/Wheezes/Rhonchi. Cardiovascular:  Regular rate and rhythm with normal S1/S2 without murmurs, rubs or gallops. Abdomen: Soft, non-tender, non-distended with normal bowel sounds. Musculoskeletal:  No clubbing, cyanosis or edema bilaterally. Full range of motion without deformity. Skin: Skin color, texture, turgor normal.  No rashes or lesions. Neurologic:  Neurovascularly intact without any focal sensory/motor deficits. Cranial nerves: II-XII intact, grossly non-focal.  Psychiatric:  Alert and oriented, thought content appropriate, normal insight  Capillary Refill: Brisk,< 3 seconds   Peripheral Pulses: +2 palpable, equal bilaterally       Labs: For convenience and continuity at follow-up the following most recent labs are provided:      CBC:    Lab Results   Component Value Date    WBC 4.5 09/14/2021    HGB 7.5 09/14/2021    HCT 22.7 09/14/2021     09/14/2021       Renal:    Lab Results   Component Value Date     09/14/2021    K 4.3 09/14/2021     09/14/2021    CO2 33 09/14/2021    BUN 18 09/14/2021    CREATININE 1.2 09/14/2021    CALCIUM 9.3 09/14/2021    PHOS 2.1 09/03/2021         Significant Diagnostic Studies    Radiology:   FL MODIFIED BARIUM SWALLOW W VIDEO   Final Result   No definite aspiration      Please see separate speech pathology report for full discussion of findings   and recommendations. XR CHEST PORTABLE   Final Result   No pneumonia or edema         VL Extremity Venous Bilateral   Final Result      NM LUNG VENT/PERFUSION (VQ)   Final Result   Very low probability for pulmonary embolism.          XR CHEST PORTABLE   Final Result   Right basilar atelectasis, scarring or airspace disease. XR CHEST PORTABLE   Final Result   Mild bibasilar heterogeneous opacities are improved from the prior study and   could relate to subsegmental atelectasis/scarring, mild residual pulmonary   edema or improving infectious/inflammatory process. No new consolidation. Follow-up PA and lateral chest radiographs in 6 weeks may be helpful for   further evaluation. Hyperexpanded lung volume can be seen in the setting of COPD.                 Consults:     IP CONSULT TO HOSPITALIST  IP CONSULT TO PALLIATIVE CARE  IP CONSULT TO PULMONOLOGY    Disposition:  SNF     Condition at Discharge: Stable    Discharge Instructions/Follow-up:  New pulmonary as outpt([facility] pul[facility])    Code Status:  FULL    Activity: activity as tolerated    Diet: see below  Solid consistency: Dysphagia Soft and Bite-Sized (Dysphagia III)  Liquid consistency: Mildly Thick (Nectar)  Liquid administration via: Cup;Straw (Small, single sips)    Discharge Medications:     Discharge Medication List as of 9/14/2021  5:54 PM           Details   guaiFENesin (MUCINEX) 600 MG extended release tablet Take 1 tablet by mouth 2 times daily for 3 days, Disp-6 tablet, R-0NO PRINT      menthol-zinc oxide (CALMOSEPTINE) 0.44-20.6 % OINT ointment Apply topically 2 times daily as needed (Apply to buttocks until resolved) Max 30 ml per day., Topical, 2 TIMES DAILY PRN Starting Tue 9/14/2021, Disp-1 g, R-0, NO PRINT      bisacodyl (DULCOLAX) 10 MG suppository Place 1 suppository rectally daily as needed for ConstipationOTC      polyethylene glycol (GLYCOLAX) 17 g packet Take 17 g by mouth 2 times daily, Disp-527 g, R-0NO PRINT      melatonin 5 MG TBDP disintegrating tablet Take 1 tablet by mouth nightly as needed (sleep)OTC      fluticasone (FLONASE) 50 MCG/ACT nasal spray 1 spray by Each Nostril route daily as needed for Rhinitis, Disp-16 g, R-0NO PRINT              Details   busPIRone (BUSPAR) 10 MG tablet Take 5 mg by mouth 3 times daily as needed Take 1 to 2 tablets 3 times daily PRNHistorical Med      pantoprazole (PROTONIX) 20 MG tablet Take 20 mg by mouth dailyHistorical Med      furosemide (LASIX) 20 MG tablet Take 20 mg by mouth daily mon wed friHistorical Med      ferrous sulfate (IRON 325) 325 (65 Fe) MG tablet Take 325 mg by mouth daily (with breakfast)Historical Med      potassium chloride (KLOR-CON) 20 MEQ packet Take 20 mEq by mouth dailyHistorical Med      Roflumilast (DALIRESP) 250 MCG tablet Take 250 mcg by mouth dailyHistorical Med      acetaminophen (TYLENOL) 500 MG tablet Take 500 mg by mouth every 6 hours as needed for PainHistorical Med      sertraline (ZOLOFT) 50 MG tablet Take 50 mg by mouth dailyHistorical Med      budesonide (PULMICORT) 0.5 MG/2ML nebulizer suspension Take 2 mLs by nebulization 2 times daily, Disp-60 ampule, R-3Normal      ipratropium-albuterol (DUONEB) 0.5-2.5 (3) MG/3ML SOLN nebulizer solution Inhale 3 mLs into the lungs every 6 hours, Disp-360 mL, R-0Normal      atorvastatin (LIPITOR) 20 MG tablet Take 1 tablet by mouth nightly, Disp-30 tablet, R-3Normal      metoprolol tartrate (LOPRESSOR) 25 MG tablet Take 1 tablet by mouth 2 times daily, Disp-60 tablet, R-3Normal      docusate sodium (COLACE, DULCOLAX) 100 MG CAPS Take 100 mg by mouth 2 times daily, Disp-30 capsule, R-0Normal      clopidogrel (PLAVIX) 75 MG tablet Take 1 tablet by mouth daily, Disp-30 tablet, R-3Print      tiotropium (SPIRIVA HANDIHALER) 18 MCG inhalation capsule Inhale 18 mcg into the lungs dailyHistorical Med      budesonide-formoterol (SYMBICORT) 160-4.5 MCG/ACT AERO Inhale 2 puffs into the lungs 2 times dailyHistorical Med      albuterol (PROVENTIL HFA) 108 (90 BASE) MCG/ACT inhaler Inhale 2 puffs into the lungs every 6 hours as needed for Wheezing or Shortness of Breath., Disp-1 Inhaler, R-2Print             Time Spent on discharge is more than 30 minutes in the examination, evaluation, counseling and review of medications and discharge plan.      Signed:    Tammy Rodriguez MD   9/15/2021      Thank you Fabiana Messer MD for the opportunity to be involved in this patient's care. If you have any questions or concerns please feel free to contact me at [phone].

## 2021-09-14 NOTE — CONSULTS
Consulted for denuded buttocks, pt has shearing on buttocks from friction, order for Calmoseptine, Wound Care to sign off, please re-consult for deterioration or changes.

## 2021-09-14 NOTE — PROGRESS NOTES
09/13/21 2130   NIV Type   Skin Protection for O2 Device Yes   Location Nose   NIV Started/Stopped On   Equipment Type v60   Mode Bilevel   Mask Type Full face mask   Mask Size Small   Settings/Measurements   IPAP 12 cmH20   CPAP/EPAP 6 cmH2O   Rate Ordered 12   Resp 20   FiO2  40 %   Vt Exhaled 378 mL   Minute Volume 11 Liters   Mask Leak (lpm) 1 lpm   Comfort Level Good   Using Accessory Muscles No   SpO2 99   Alarm Settings   Alarms On Y   Delay Alarm 20 sec(s)   Oxygen Therapy/Pulse Ox   O2 Device PAP (positive airway pressure)   $Pulse Oximeter $Spot check (multiple/continuous)

## 2021-09-14 NOTE — PROGRESS NOTES
Physical Therapy    Facility/Department: [facility] - MED SURG/ORTHO  Initial Assessment    NAME: Sonia Hi  : 1940  MRN: [id number]    Date of Service: 2021    Discharge Recommendations:  Subacute/Skilled Nursing Facility   PT Equipment Recommendations  Equipment Needed: No    Assessment   Body structures, Functions, Activity limitations: Decreased functional mobility ; Decreased balance;Decreased posture;Decreased strength;Decreased endurance;Decreased coordination  Assessment: Pt is [de-identified] yo female who presents with diagnosis of elevated troponin. Pt admitted from SNF. Prior to original hospital stay was ambulating without device within home. Pt grossly min A for mobility with RW this date. Limited d/t anxiety and ZENDEJAS. SpO2 stable on 3L throughout session (baseline 2L). Requires frequent therapuetic rest breaks and increased time d/t fatigue and difficulty breathing. Pt would benefit from continued skilled therapy to address deficits. Recommend SNF at d/c due to current deficits and activity limitations. Treatment Diagnosis: impaired functional mobility  Specific instructions for Next Treatment: progress mobility as tolerated  Prognosis: Good  Decision Making: Medium Complexity  PT Education: Goals; General Safety;Gait Training;PT Role;Disease Specific Education;Plan of Care; Functional Mobility Training;Transfer Training;Energy Conservation  Disease Specific Education: Pt educated on importance of OOB mobility, prevention of complications of bedrest, and general safety during hospitalization. Pt verbalized understanding  REQUIRES PT FOLLOW UP: Yes  Activity Tolerance  Activity Tolerance: Patient Tolerated treatment well;Patient limited by fatigue;Patient limited by endurance  Activity Tolerance: Seated EOB /61, HR 90; SpO2 97% on 3L. Post gait seated in chair SpO2 96% on 3L       Patient Diagnosis(es): The primary encounter diagnosis was HCAP (healthcare-associated pneumonia).  Diagnoses of Acute respiratory failure with hypoxia ([facility] [address] Elevated troponin, and Elevated brain natriuretic peptide (BNP) level were also pertinent to this visit. has a past medical history of NADJA (acute kidney injury) ([facility] [address] Asthma, COPD (chronic obstructive pulmonary disease) ([facility] [address] GERD (gastroesophageal reflux disease), Hyperlipidemia, Hypertension, MRSA (methicillin resistant staph aureus) culture positive, and OA (osteoarthritis). has a past surgical history that includes  section; Mastectomy, partial (Left); bronchoscopy (2019); Cardiac catheterization (2019); Coronary artery bypass graft (N/A, 2019); Colonoscopy (N/A, 2020); Sigmoidoscopy (2020); and sigmoidoscopy (N/A, 2020).     Restrictions  Restrictions/Precautions  Restrictions/Precautions: General Precautions, Fall Risk, Up as Tolerated  Vision/Hearing  Vision: Impaired  Vision Exceptions: Wears glasses at all times  Hearing: Exceptions to [facility]  Hearing Exceptions: Hard of hearing/hearing concerns (Normally hearing is fine, hearing just \"stopped\" working.)     Subjective  General  Chart Reviewed: Yes  Family / Caregiver Present: Yes (neice)  Referring Practitioner: Dr. Deja Arroyo MD  Referral Date : 21  Diagnosis: Elevated Troponin  Follows Commands: Within Functional Limits  General Comment  Comments: Pt resting in bed on approach; RN cleared pt for therapy  Subjective  Subjective: pt agreeable to therapy  Pain Screening  Patient Currently in Pain: Denies    Orientation  Orientation  Overall Orientation Status: Within Functional Limits  Social/Functional History  Social/Functional History  Lives With: Son (works from home)  Type of Home: [address]: One level  Home Access: [address] to enter without rails  Entrance Stairs - Number of Steps: 1 javier  Bathroom Shower/Tub: Tub/Shower unit  Bathroom Toilet: Standard  Bathroom Equipment: Grab bars in [facility]: [place], [address] bed, Balance Training, Endurance Training, Functional Mobility Training, Transfer Training, Gait Training, Equipment Evaluation, Education, & procurement, Patient/Caregiver Education & Training, Stair training  Safety Devices  Type of devices: All fall risk precautions in place, Call light within reach, Chair alarm in place, Gait belt, Patient at risk for falls, Nurse notified, Left in chair                                   AM-PAC Score     AM-PAC Inpatient Mobility without Stair Climbing Raw Score : 14 (09/14/21 1443)  AM-PAC Inpatient without Stair Climbing T-Scale Score : 40.85 (09/14/21 1443)  Mobility Inpatient CMS 0-100% Score: 53.33 (09/14/21 1443)  Mobility Inpatient without Stair CMS G-Code Modifier : CK (09/14/21 1443)       Goals  Short term goals  Time Frame for Short term goals: 1 week (9/21) unless otherwise specified  Short term goal 1: Pt will be SBA with bed mobility. Short term goal 2: Pt will be SBA for transfers with RW. Short term goal 3: Pt will ambulate 25 ft with RW and min A. Short term goal 4: 9/17: Pt will participate in 12-15 reps of BLE exercises to promote strength and activity tolerance. Patient Goals   Patient goals : \"to get better and go home\"       Therapy Time   Individual Concurrent Group Co-treatment   Time In 0218         Time Out 1355         Minutes 38         Timed Code Treatment Minutes: [facility], PT, DPT  If pt is unable to be seen after this session, please let this note serve as discharge summary. Please see case management note for discharge disposition. Thank you.

## 2021-09-15 ENCOUNTER — HOSPITAL ENCOUNTER (INPATIENT)
Age: 81
LOS: 7 days | Discharge: HOME | DRG: 177 | End: 2021-09-23
Attending: EMERGENCY MEDICINE | Admitting: INTERNAL MEDICINE
Payer: MEDICARE

## 2021-09-15 ENCOUNTER — APPOINTMENT (OUTPATIENT)
Dept: CT IMAGING | Age: 81
DRG: 177 | End: 2021-09-15
Payer: MEDICARE

## 2021-09-15 ENCOUNTER — APPOINTMENT (OUTPATIENT)
Dept: GENERAL RADIOLOGY | Age: 81
DRG: 177 | End: 2021-09-15
Payer: MEDICARE

## 2021-09-15 DIAGNOSIS — J18.9 PNEUMONIA DUE TO INFECTIOUS ORGANISM, UNSPECIFIED LATERALITY, UNSPECIFIED PART OF LUNG: ICD-10-CM

## 2021-09-15 DIAGNOSIS — J96.01 ACUTE RESPIRATORY FAILURE WITH HYPOXIA (HCC): Primary | ICD-10-CM

## 2021-09-15 LAB
A/G RATIO: 1.1 (ref 1.1–2.2)
ALBUMIN SERPL-MCNC: 3.7 G/DL (ref 3.4–5)
ALP BLD-CCNC: 103 U/L (ref 40–129)
ALT SERPL-CCNC: 10 U/L (ref 10–40)
ANION GAP SERPL CALCULATED.3IONS-SCNC: 10 MMOL/L (ref 3–16)
ANISOCYTOSIS: ABNORMAL
AST SERPL-CCNC: 19 U/L (ref 15–37)
ATYPICAL LYMPHOCYTE RELATIVE PERCENT: 3 % (ref 0–6)
BASE EXCESS ARTERIAL: 3.2 MMOL/L (ref -3–3)
BASOPHILS ABSOLUTE: 0 K/UL (ref 0–0.2)
BASOPHILS RELATIVE PERCENT: 0 %
BILIRUB SERPL-MCNC: 0.4 MG/DL (ref 0–1)
BUN BLDV-MCNC: 17 MG/DL (ref 7–20)
CALCIUM SERPL-MCNC: 9.6 MG/DL (ref 8.3–10.6)
CARBOXYHEMOGLOBIN ARTERIAL: 0.3 % (ref 0–1.5)
CHLORIDE BLD-SCNC: 103 MMOL/L (ref 99–110)
CO2: 31 MMOL/L (ref 21–32)
CREAT SERPL-MCNC: 1.1 MG/DL (ref 0.6–1.2)
EOSINOPHILS ABSOLUTE: 0 K/UL (ref 0–0.6)
EOSINOPHILS RELATIVE PERCENT: 0 %
GFR AFRICAN AMERICAN: 58
GFR NON-AFRICAN AMERICAN: 48
GLOBULIN: 3.4 G/DL
GLUCOSE BLD-MCNC: 187 MG/DL (ref 70–99)
HCO3 ARTERIAL: 35.7 MMOL/L (ref 21–29)
HCT VFR BLD CALC: 31.9 % (ref 36–48)
HEMOGLOBIN, ART, EXTENDED: 9.9 G/DL (ref 12–16)
HEMOGLOBIN: 9.7 G/DL (ref 12–16)
LYMPHOCYTES ABSOLUTE: 1.7 K/UL (ref 1–5.1)
LYMPHOCYTES RELATIVE PERCENT: 12 %
MCH RBC QN AUTO: 28.9 PG (ref 26–34)
MCHC RBC AUTO-ENTMCNC: 30.5 G/DL (ref 31–36)
MCV RBC AUTO: 94.8 FL (ref 80–100)
METHEMOGLOBIN ARTERIAL: 0.1 %
MONOCYTES ABSOLUTE: 0.8 K/UL (ref 0–1.3)
MONOCYTES RELATIVE PERCENT: 7 %
NEUTROPHILS ABSOLUTE: 8.8 K/UL (ref 1.7–7.7)
NEUTROPHILS RELATIVE PERCENT: 78 %
NUCLEATED RED BLOOD CELLS: 1 /100 WBC
O2 SAT, ARTERIAL: 97.3 %
O2 THERAPY: ABNORMAL
PCO2 ARTERIAL: 125.4 MMHG (ref 35–45)
PDW BLD-RTO: 19 % (ref 12.4–15.4)
PH ARTERIAL: 7.07 (ref 7.35–7.45)
PLATELET # BLD: 490 K/UL (ref 135–450)
PLATELET SLIDE REVIEW: ABNORMAL
PMV BLD AUTO: 8.1 FL (ref 5–10.5)
PO2 ARTERIAL: 138.8 MMHG (ref 75–108)
POIKILOCYTES: ABNORMAL
POLYCHROMASIA: ABNORMAL
POTASSIUM REFLEX MAGNESIUM: 5.3 MMOL/L (ref 3.5–5.1)
RBC # BLD: 3.36 M/UL (ref 4–5.2)
SLIDE REVIEW: ABNORMAL
SODIUM BLD-SCNC: 144 MMOL/L (ref 136–145)
TCO2 ARTERIAL: 39.5 MMOL/L
TOTAL PROTEIN: 7.1 G/DL (ref 6.4–8.2)
TROPONIN: 0.07 NG/ML
WBC # BLD: 11.3 K/UL (ref 4–11)

## 2021-09-15 PROCEDURE — 6360000002 HC RX W HCPCS

## 2021-09-15 PROCEDURE — 70450 CT HEAD/BRAIN W/O DYE: CPT

## 2021-09-15 PROCEDURE — 96375 TX/PRO/DX INJ NEW DRUG ADDON: CPT

## 2021-09-15 PROCEDURE — 2580000003 HC RX 258: Performed by: EMERGENCY MEDICINE

## 2021-09-15 PROCEDURE — 96365 THER/PROPH/DIAG IV INF INIT: CPT

## 2021-09-15 PROCEDURE — 99284 EMERGENCY DEPT VISIT MOD MDM: CPT

## 2021-09-15 PROCEDURE — 96367 TX/PROPH/DG ADDL SEQ IV INF: CPT

## 2021-09-15 PROCEDURE — 83036 HEMOGLOBIN GLYCOSYLATED A1C: CPT

## 2021-09-15 PROCEDURE — 87040 BLOOD CULTURE FOR BACTERIA: CPT

## 2021-09-15 PROCEDURE — 36415 COLL VENOUS BLD VENIPUNCTURE: CPT

## 2021-09-15 PROCEDURE — 80053 COMPREHEN METABOLIC PANEL: CPT

## 2021-09-15 PROCEDURE — 84484 ASSAY OF TROPONIN QUANT: CPT

## 2021-09-15 PROCEDURE — 6360000002 HC RX W HCPCS: Performed by: EMERGENCY MEDICINE

## 2021-09-15 PROCEDURE — 85025 COMPLETE CBC W/AUTO DIFF WBC: CPT

## 2021-09-15 PROCEDURE — 82803 BLOOD GASES ANY COMBINATION: CPT

## 2021-09-15 PROCEDURE — 96366 THER/PROPH/DIAG IV INF ADDON: CPT

## 2021-09-15 PROCEDURE — 36600 WITHDRAWAL OF ARTERIAL BLOOD: CPT

## 2021-09-15 PROCEDURE — 93005 ELECTROCARDIOGRAM TRACING: CPT | Performed by: EMERGENCY MEDICINE

## 2021-09-15 PROCEDURE — 71045 X-RAY EXAM CHEST 1 VIEW: CPT

## 2021-09-15 RX ORDER — LEVOFLOXACIN 5 MG/ML
500 INJECTION, SOLUTION INTRAVENOUS ONCE
Status: COMPLETED | OUTPATIENT
Start: 2021-09-15 | End: 2021-09-16

## 2021-09-15 RX ORDER — ONDANSETRON 2 MG/ML
4 INJECTION INTRAMUSCULAR; INTRAVENOUS EVERY 6 HOURS PRN
Status: DISCONTINUED | OUTPATIENT
Start: 2021-09-15 | End: 2021-09-23 | Stop reason: HOSPADM

## 2021-09-15 RX ORDER — ONDANSETRON 2 MG/ML
INJECTION INTRAMUSCULAR; INTRAVENOUS
Status: COMPLETED
Start: 2021-09-15 | End: 2021-09-15

## 2021-09-15 RX ADMIN — ONDANSETRON HYDROCHLORIDE 4 MG: 2 INJECTION, SOLUTION INTRAMUSCULAR; INTRAVENOUS at 18:28

## 2021-09-15 RX ADMIN — CEFEPIME 2000 MG: 2 INJECTION, POWDER, FOR SOLUTION INTRAVENOUS at 19:56

## 2021-09-15 RX ADMIN — LEVOFLOXACIN 500 MG: 500 INJECTION, SOLUTION INTRAVENOUS at 21:37

## 2021-09-15 ASSESSMENT — PAIN DESCRIPTION - LOCATION: LOCATION: ABDOMEN

## 2021-09-15 NOTE — ED NOTES
Parrish Sheridan, granddaughter, called for an update on pt. This RN told family that there were no updates at this time.      Solange Carter RN  09/15/21 8904

## 2021-09-16 ENCOUNTER — APPOINTMENT (OUTPATIENT)
Dept: GENERAL RADIOLOGY | Age: 81
DRG: 177 | End: 2021-09-16
Payer: MEDICARE

## 2021-09-16 PROBLEM — J96.02 ACUTE RESPIRATORY FAILURE WITH HYPOXIA AND HYPERCAPNIA (HCC): Status: ACTIVE | Noted: 2021-09-16

## 2021-09-16 PROBLEM — J96.01 ACUTE RESPIRATORY FAILURE WITH HYPOXIA AND HYPERCAPNIA (HCC): Status: ACTIVE | Noted: 2021-09-16

## 2021-09-16 LAB
A/G RATIO: 1.1 (ref 1.1–2.2)
ALBUMIN SERPL-MCNC: 3.2 G/DL (ref 3.4–5)
ALP BLD-CCNC: 79 U/L (ref 40–129)
ALT SERPL-CCNC: 9 U/L (ref 10–40)
ANION GAP SERPL CALCULATED.3IONS-SCNC: 13 MMOL/L (ref 3–16)
AST SERPL-CCNC: 15 U/L (ref 15–37)
BASE EXCESS ARTERIAL: 2.6 MMOL/L (ref -3–3)
BASE EXCESS ARTERIAL: 4.7 MMOL/L (ref -3–3)
BASE EXCESS ARTERIAL: 5.2 MMOL/L (ref -3–3)
BASOPHILS ABSOLUTE: 0 K/UL (ref 0–0.2)
BASOPHILS RELATIVE PERCENT: 0.2 %
BILIRUB SERPL-MCNC: 0.3 MG/DL (ref 0–1)
BUN BLDV-MCNC: 19 MG/DL (ref 7–20)
CALCIUM SERPL-MCNC: 9.2 MG/DL (ref 8.3–10.6)
CARBOXYHEMOGLOBIN ARTERIAL: 0.1 % (ref 0–1.5)
CARBOXYHEMOGLOBIN ARTERIAL: 0.3 % (ref 0–1.5)
CARBOXYHEMOGLOBIN ARTERIAL: 0.3 % (ref 0–1.5)
CHLORIDE BLD-SCNC: 105 MMOL/L (ref 99–110)
CO2: 25 MMOL/L (ref 21–32)
CREAT SERPL-MCNC: 1 MG/DL (ref 0.6–1.2)
EKG ATRIAL RATE: 106 BPM
EKG ATRIAL RATE: 97 BPM
EKG DIAGNOSIS: NORMAL
EKG DIAGNOSIS: NORMAL
EKG P AXIS: 78 DEGREES
EKG P AXIS: 80 DEGREES
EKG P-R INTERVAL: 112 MS
EKG P-R INTERVAL: 132 MS
EKG Q-T INTERVAL: 322 MS
EKG Q-T INTERVAL: 360 MS
EKG QRS DURATION: 86 MS
EKG QRS DURATION: 88 MS
EKG QTC CALCULATION (BAZETT): 427 MS
EKG QTC CALCULATION (BAZETT): 457 MS
EKG R AXIS: -20 DEGREES
EKG R AXIS: -54 DEGREES
EKG T AXIS: 69 DEGREES
EKG T AXIS: 80 DEGREES
EKG VENTRICULAR RATE: 106 BPM
EKG VENTRICULAR RATE: 97 BPM
EOSINOPHILS ABSOLUTE: 0 K/UL (ref 0–0.6)
EOSINOPHILS RELATIVE PERCENT: 0.5 %
GFR AFRICAN AMERICAN: >60
GFR NON-AFRICAN AMERICAN: 53
GLOBULIN: 3 G/DL
GLUCOSE BLD-MCNC: 103 MG/DL (ref 70–99)
GLUCOSE BLD-MCNC: 108 MG/DL (ref 70–99)
GLUCOSE BLD-MCNC: 117 MG/DL (ref 70–99)
GLUCOSE BLD-MCNC: 65 MG/DL (ref 70–99)
GLUCOSE BLD-MCNC: 68 MG/DL (ref 70–99)
GLUCOSE BLD-MCNC: 77 MG/DL (ref 70–99)
HCO3 ARTERIAL: 29.6 MMOL/L (ref 21–29)
HCO3 ARTERIAL: 31.9 MMOL/L (ref 21–29)
HCO3 ARTERIAL: 32.4 MMOL/L (ref 21–29)
HCT VFR BLD CALC: 27.5 % (ref 36–48)
HEMOGLOBIN, ART, EXTENDED: 8.4 G/DL (ref 12–16)
HEMOGLOBIN, ART, EXTENDED: 8.5 G/DL (ref 12–16)
HEMOGLOBIN, ART, EXTENDED: 9.1 G/DL (ref 12–16)
HEMOGLOBIN: 8.4 G/DL (ref 12–16)
INFLUENZA A: NOT DETECTED
INFLUENZA B: NOT DETECTED
INR BLD: 0.99 (ref 0.88–1.12)
IRON SATURATION: 12 % (ref 15–50)
IRON: 29 UG/DL (ref 37–145)
LACTIC ACID: 1.3 MMOL/L (ref 0.4–2)
LYMPHOCYTES ABSOLUTE: 0.8 K/UL (ref 1–5.1)
LYMPHOCYTES RELATIVE PERCENT: 9.6 %
MCH RBC QN AUTO: 28.9 PG (ref 26–34)
MCHC RBC AUTO-ENTMCNC: 30.7 G/DL (ref 31–36)
MCV RBC AUTO: 94.4 FL (ref 80–100)
METHEMOGLOBIN ARTERIAL: 0 %
METHEMOGLOBIN ARTERIAL: 0.3 %
METHEMOGLOBIN ARTERIAL: 0.3 %
MONOCYTES ABSOLUTE: 0.3 K/UL (ref 0–1.3)
MONOCYTES RELATIVE PERCENT: 3.8 %
NEUTROPHILS ABSOLUTE: 7.4 K/UL (ref 1.7–7.7)
NEUTROPHILS RELATIVE PERCENT: 85.9 %
O2 SAT, ARTERIAL: 95.8 %
O2 SAT, ARTERIAL: 98.8 %
O2 SAT, ARTERIAL: 98.9 %
O2 THERAPY: ABNORMAL
PCO2 ARTERIAL: 60.1 MMHG (ref 35–45)
PCO2 ARTERIAL: 60.7 MMHG (ref 35–45)
PCO2 ARTERIAL: 69.5 MMHG (ref 35–45)
PDW BLD-RTO: 18.1 % (ref 12.4–15.4)
PERFORMED ON: ABNORMAL
PH ARTERIAL: 7.29 (ref 7.35–7.45)
PH ARTERIAL: 7.31 (ref 7.35–7.45)
PH ARTERIAL: 7.34 (ref 7.35–7.45)
PLATELET # BLD: 327 K/UL (ref 135–450)
PMV BLD AUTO: 7.9 FL (ref 5–10.5)
PO2 ARTERIAL: 162.6 MMHG (ref 75–108)
PO2 ARTERIAL: 163.6 MMHG (ref 75–108)
PO2 ARTERIAL: 88.7 MMHG (ref 75–108)
POTASSIUM REFLEX MAGNESIUM: 5.3 MMOL/L (ref 3.5–5.1)
PROCALCITONIN: 1.68 NG/ML (ref 0–0.15)
PROTHROMBIN TIME: 11.2 SEC (ref 9.9–12.7)
RBC # BLD: 2.91 M/UL (ref 4–5.2)
SARS-COV-2 RNA, RT PCR: NOT DETECTED
SODIUM BLD-SCNC: 143 MMOL/L (ref 136–145)
TCO2 ARTERIAL: 31.5 MMOL/L
TCO2 ARTERIAL: 33.8 MMOL/L
TCO2 ARTERIAL: 34.5 MMOL/L
TOTAL IRON BINDING CAPACITY: 243 UG/DL (ref 260–445)
TOTAL PROTEIN: 6.2 G/DL (ref 6.4–8.2)
TROPONIN: 0.05 NG/ML
TROPONIN: 0.06 NG/ML
WBC # BLD: 8.6 K/UL (ref 4–11)

## 2021-09-16 PROCEDURE — 99291 CRITICAL CARE FIRST HOUR: CPT | Performed by: INTERNAL MEDICINE

## 2021-09-16 PROCEDURE — 2000000000 HC ICU R&B

## 2021-09-16 PROCEDURE — 6360000002 HC RX W HCPCS: Performed by: INTERNAL MEDICINE

## 2021-09-16 PROCEDURE — 2580000003 HC RX 258: Performed by: INTERNAL MEDICINE

## 2021-09-16 PROCEDURE — 83540 ASSAY OF IRON: CPT

## 2021-09-16 PROCEDURE — 93010 ELECTROCARDIOGRAM REPORT: CPT | Performed by: INTERNAL MEDICINE

## 2021-09-16 PROCEDURE — 6370000000 HC RX 637 (ALT 250 FOR IP): Performed by: INTERNAL MEDICINE

## 2021-09-16 PROCEDURE — 2500000003 HC RX 250 WO HCPCS: Performed by: INTERNAL MEDICINE

## 2021-09-16 PROCEDURE — 94660 CPAP INITIATION&MGMT: CPT

## 2021-09-16 PROCEDURE — 71045 X-RAY EXAM CHEST 1 VIEW: CPT

## 2021-09-16 PROCEDURE — 94640 AIRWAY INHALATION TREATMENT: CPT

## 2021-09-16 PROCEDURE — 83550 IRON BINDING TEST: CPT

## 2021-09-16 PROCEDURE — 2500000003 HC RX 250 WO HCPCS: Performed by: HOSPITALIST

## 2021-09-16 PROCEDURE — 36600 WITHDRAWAL OF ARTERIAL BLOOD: CPT

## 2021-09-16 PROCEDURE — 84145 PROCALCITONIN (PCT): CPT

## 2021-09-16 PROCEDURE — 85025 COMPLETE CBC W/AUTO DIFF WBC: CPT

## 2021-09-16 PROCEDURE — 84484 ASSAY OF TROPONIN QUANT: CPT

## 2021-09-16 PROCEDURE — 6360000002 HC RX W HCPCS: Performed by: HOSPITALIST

## 2021-09-16 PROCEDURE — 85610 PROTHROMBIN TIME: CPT

## 2021-09-16 PROCEDURE — 82803 BLOOD GASES ANY COMBINATION: CPT

## 2021-09-16 PROCEDURE — 2700000000 HC OXYGEN THERAPY PER DAY

## 2021-09-16 PROCEDURE — 36415 COLL VENOUS BLD VENIPUNCTURE: CPT

## 2021-09-16 PROCEDURE — 6370000000 HC RX 637 (ALT 250 FOR IP): Performed by: HOSPITALIST

## 2021-09-16 PROCEDURE — C9113 INJ PANTOPRAZOLE SODIUM, VIA: HCPCS | Performed by: INTERNAL MEDICINE

## 2021-09-16 PROCEDURE — 80053 COMPREHEN METABOLIC PANEL: CPT

## 2021-09-16 PROCEDURE — 94761 N-INVAS EAR/PLS OXIMETRY MLT: CPT

## 2021-09-16 PROCEDURE — 87636 SARSCOV2 & INF A&B AMP PRB: CPT

## 2021-09-16 PROCEDURE — 02HV33Z INSERTION OF INFUSION DEVICE INTO SUPERIOR VENA CAVA, PERCUTANEOUS APPROACH: ICD-10-PCS | Performed by: INTERNAL MEDICINE

## 2021-09-16 PROCEDURE — 36556 INSERT NON-TUNNEL CV CATH: CPT | Performed by: INTERNAL MEDICINE

## 2021-09-16 PROCEDURE — 93005 ELECTROCARDIOGRAM TRACING: CPT

## 2021-09-16 PROCEDURE — 83605 ASSAY OF LACTIC ACID: CPT

## 2021-09-16 RX ORDER — FERROUS SULFATE 325(65) MG
325 TABLET ORAL
Status: DISCONTINUED | OUTPATIENT
Start: 2021-09-16 | End: 2021-09-23 | Stop reason: HOSPADM

## 2021-09-16 RX ORDER — GLUCAGON 1 MG/ML
1 KIT INJECTION PRN
Status: DISCONTINUED | OUTPATIENT
Start: 2021-09-16 | End: 2021-09-23 | Stop reason: HOSPADM

## 2021-09-16 RX ORDER — SODIUM CHLORIDE 0.9 % (FLUSH) 0.9 %
5-40 SYRINGE (ML) INJECTION EVERY 12 HOURS SCHEDULED
Status: DISCONTINUED | OUTPATIENT
Start: 2021-09-16 | End: 2021-09-23 | Stop reason: HOSPADM

## 2021-09-16 RX ORDER — GUAIFENESIN 600 MG/1
600 TABLET, EXTENDED RELEASE ORAL 2 TIMES DAILY
Status: DISCONTINUED | OUTPATIENT
Start: 2021-09-16 | End: 2021-09-23 | Stop reason: HOSPADM

## 2021-09-16 RX ORDER — DEXTROSE MONOHYDRATE 25 G/50ML
12.5 INJECTION, SOLUTION INTRAVENOUS PRN
Status: DISCONTINUED | OUTPATIENT
Start: 2021-09-16 | End: 2021-09-16 | Stop reason: SDUPTHER

## 2021-09-16 RX ORDER — ACETAMINOPHEN 650 MG/1
650 SUPPOSITORY RECTAL EVERY 6 HOURS PRN
Status: DISCONTINUED | OUTPATIENT
Start: 2021-09-16 | End: 2021-09-23 | Stop reason: HOSPADM

## 2021-09-16 RX ORDER — SODIUM CHLORIDE 9 MG/ML
INJECTION, SOLUTION INTRAVENOUS CONTINUOUS
Status: DISCONTINUED | OUTPATIENT
Start: 2021-09-16 | End: 2021-09-16

## 2021-09-16 RX ORDER — CLOPIDOGREL BISULFATE 75 MG/1
75 TABLET ORAL DAILY
Status: DISCONTINUED | OUTPATIENT
Start: 2021-09-16 | End: 2021-09-18

## 2021-09-16 RX ORDER — PANTOPRAZOLE SODIUM 40 MG/10ML
40 INJECTION, POWDER, LYOPHILIZED, FOR SOLUTION INTRAVENOUS DAILY
Status: DISCONTINUED | OUTPATIENT
Start: 2021-09-16 | End: 2021-09-23 | Stop reason: HOSPADM

## 2021-09-16 RX ORDER — GLUCAGON 1 MG/ML
1 KIT INJECTION PRN
Status: DISCONTINUED | OUTPATIENT
Start: 2021-09-16 | End: 2021-09-16 | Stop reason: SDUPTHER

## 2021-09-16 RX ORDER — SODIUM CHLORIDE 0.9 % (FLUSH) 0.9 %
5-40 SYRINGE (ML) INJECTION PRN
Status: DISCONTINUED | OUTPATIENT
Start: 2021-09-16 | End: 2021-09-23 | Stop reason: HOSPADM

## 2021-09-16 RX ORDER — DEXTROSE AND SODIUM CHLORIDE 5; .9 G/100ML; G/100ML
INJECTION, SOLUTION INTRAVENOUS CONTINUOUS
Status: DISCONTINUED | OUTPATIENT
Start: 2021-09-16 | End: 2021-09-16

## 2021-09-16 RX ORDER — NICOTINE POLACRILEX 4 MG
15 LOZENGE BUCCAL PRN
Status: DISCONTINUED | OUTPATIENT
Start: 2021-09-16 | End: 2021-09-16 | Stop reason: SDUPTHER

## 2021-09-16 RX ORDER — DEXTROSE MONOHYDRATE 50 MG/ML
100 INJECTION, SOLUTION INTRAVENOUS PRN
Status: DISCONTINUED | OUTPATIENT
Start: 2021-09-16 | End: 2021-09-16 | Stop reason: SDUPTHER

## 2021-09-16 RX ORDER — 0.9 % SODIUM CHLORIDE 0.9 %
1000 INTRAVENOUS SOLUTION INTRAVENOUS ONCE
Status: COMPLETED | OUTPATIENT
Start: 2021-09-16 | End: 2021-09-17

## 2021-09-16 RX ORDER — ACETAMINOPHEN 325 MG/1
650 TABLET ORAL EVERY 6 HOURS PRN
Status: DISCONTINUED | OUTPATIENT
Start: 2021-09-16 | End: 2021-09-23 | Stop reason: HOSPADM

## 2021-09-16 RX ORDER — DEXTROSE AND SODIUM CHLORIDE 5; .45 G/100ML; G/100ML
INJECTION, SOLUTION INTRAVENOUS CONTINUOUS
Status: DISCONTINUED | OUTPATIENT
Start: 2021-09-16 | End: 2021-09-21

## 2021-09-16 RX ORDER — IPRATROPIUM BROMIDE AND ALBUTEROL SULFATE 2.5; .5 MG/3ML; MG/3ML
1 SOLUTION RESPIRATORY (INHALATION)
Status: DISCONTINUED | OUTPATIENT
Start: 2021-09-16 | End: 2021-09-23 | Stop reason: HOSPADM

## 2021-09-16 RX ORDER — METHYLPREDNISOLONE SODIUM SUCCINATE 40 MG/ML
40 INJECTION, POWDER, LYOPHILIZED, FOR SOLUTION INTRAMUSCULAR; INTRAVENOUS EVERY 12 HOURS
Status: DISCONTINUED | OUTPATIENT
Start: 2021-09-16 | End: 2021-09-18

## 2021-09-16 RX ORDER — BISACODYL 10 MG
10 SUPPOSITORY, RECTAL RECTAL DAILY PRN
Status: DISCONTINUED | OUTPATIENT
Start: 2021-09-16 | End: 2021-09-23 | Stop reason: HOSPADM

## 2021-09-16 RX ORDER — DOCUSATE SODIUM 100 MG/1
100 CAPSULE, LIQUID FILLED ORAL 2 TIMES DAILY
Status: DISCONTINUED | OUTPATIENT
Start: 2021-09-16 | End: 2021-09-23 | Stop reason: HOSPADM

## 2021-09-16 RX ORDER — SODIUM CHLORIDE 9 MG/ML
25 INJECTION, SOLUTION INTRAVENOUS PRN
Status: DISCONTINUED | OUTPATIENT
Start: 2021-09-16 | End: 2021-09-23 | Stop reason: HOSPADM

## 2021-09-16 RX ORDER — DEXTROSE MONOHYDRATE 25 G/50ML
12.5 INJECTION, SOLUTION INTRAVENOUS PRN
Status: DISCONTINUED | OUTPATIENT
Start: 2021-09-16 | End: 2021-09-23 | Stop reason: HOSPADM

## 2021-09-16 RX ORDER — DEXTROSE MONOHYDRATE 50 MG/ML
INJECTION, SOLUTION INTRAVENOUS CONTINUOUS
Status: DISCONTINUED | OUTPATIENT
Start: 2021-09-16 | End: 2021-09-16

## 2021-09-16 RX ORDER — NICOTINE POLACRILEX 4 MG
15 LOZENGE BUCCAL PRN
Status: DISCONTINUED | OUTPATIENT
Start: 2021-09-16 | End: 2021-09-23 | Stop reason: HOSPADM

## 2021-09-16 RX ORDER — DEXTROSE MONOHYDRATE 50 MG/ML
100 INJECTION, SOLUTION INTRAVENOUS PRN
Status: DISCONTINUED | OUTPATIENT
Start: 2021-09-16 | End: 2021-09-23 | Stop reason: HOSPADM

## 2021-09-16 RX ORDER — ALBUTEROL SULFATE 2.5 MG/3ML
2.5 SOLUTION RESPIRATORY (INHALATION) EVERY 6 HOURS PRN
Status: DISCONTINUED | OUTPATIENT
Start: 2021-09-16 | End: 2021-09-23 | Stop reason: HOSPADM

## 2021-09-16 RX ORDER — LIDOCAINE HYDROCHLORIDE 10 MG/ML
5 INJECTION, SOLUTION EPIDURAL; INFILTRATION; INTRACAUDAL; PERINEURAL ONCE
Status: DISCONTINUED | OUTPATIENT
Start: 2021-09-16 | End: 2021-09-21

## 2021-09-16 RX ORDER — LORAZEPAM 2 MG/ML
0.5 INJECTION INTRAMUSCULAR ONCE
Status: COMPLETED | OUTPATIENT
Start: 2021-09-16 | End: 2021-09-16

## 2021-09-16 RX ORDER — ATORVASTATIN CALCIUM 10 MG/1
20 TABLET, FILM COATED ORAL NIGHTLY
Status: DISCONTINUED | OUTPATIENT
Start: 2021-09-16 | End: 2021-09-23 | Stop reason: HOSPADM

## 2021-09-16 RX ORDER — IPRATROPIUM BROMIDE AND ALBUTEROL SULFATE 2.5; .5 MG/3ML; MG/3ML
1 SOLUTION RESPIRATORY (INHALATION)
Status: DISCONTINUED | OUTPATIENT
Start: 2021-09-16 | End: 2021-09-16

## 2021-09-16 RX ADMIN — DOCUSATE SODIUM 100 MG: 100 CAPSULE, LIQUID FILLED ORAL at 20:16

## 2021-09-16 RX ADMIN — IPRATROPIUM BROMIDE AND ALBUTEROL SULFATE 1 AMPULE: .5; 3 SOLUTION RESPIRATORY (INHALATION) at 19:21

## 2021-09-16 RX ADMIN — NOREPINEPHRINE BITARTRATE 2 MCG/MIN: 1 INJECTION INTRAVENOUS at 22:02

## 2021-09-16 RX ADMIN — SODIUM CHLORIDE, PRESERVATIVE FREE 10 ML: 5 INJECTION INTRAVENOUS at 20:17

## 2021-09-16 RX ADMIN — IPRATROPIUM BROMIDE AND ALBUTEROL SULFATE 1 AMPULE: .5; 3 SOLUTION RESPIRATORY (INHALATION) at 23:17

## 2021-09-16 RX ADMIN — SODIUM CHLORIDE, PRESERVATIVE FREE 10 ML: 5 INJECTION INTRAVENOUS at 20:28

## 2021-09-16 RX ADMIN — VANCOMYCIN HYDROCHLORIDE 1000 MG: 1 INJECTION, POWDER, LYOPHILIZED, FOR SOLUTION INTRAVENOUS at 14:00

## 2021-09-16 RX ADMIN — LORAZEPAM 0.5 MG: 2 INJECTION INTRAMUSCULAR; INTRAVENOUS at 05:59

## 2021-09-16 RX ADMIN — CEFEPIME 2000 MG: 2 INJECTION, POWDER, FOR SOLUTION INTRAVENOUS at 11:51

## 2021-09-16 RX ADMIN — ACETAMINOPHEN 650 MG: 325 TABLET ORAL at 20:44

## 2021-09-16 RX ADMIN — DEXTROSE AND SODIUM CHLORIDE: 5; 450 INJECTION, SOLUTION INTRAVENOUS at 18:13

## 2021-09-16 RX ADMIN — GUAIFENESIN 600 MG: 600 TABLET, EXTENDED RELEASE ORAL at 20:16

## 2021-09-16 RX ADMIN — SODIUM CHLORIDE: 9 INJECTION, SOLUTION INTRAVENOUS at 05:59

## 2021-09-16 RX ADMIN — IPRATROPIUM BROMIDE AND ALBUTEROL SULFATE 1 AMPULE: .5; 3 SOLUTION RESPIRATORY (INHALATION) at 14:39

## 2021-09-16 RX ADMIN — METHYLPREDNISOLONE SODIUM SUCCINATE 40 MG: 40 INJECTION, POWDER, FOR SOLUTION INTRAMUSCULAR; INTRAVENOUS at 20:20

## 2021-09-16 RX ADMIN — Medication 10 ML: at 13:53

## 2021-09-16 RX ADMIN — SODIUM CHLORIDE 1000 ML: 9 INJECTION, SOLUTION INTRAVENOUS at 22:50

## 2021-09-16 RX ADMIN — ATORVASTATIN CALCIUM 20 MG: 10 TABLET, FILM COATED ORAL at 20:16

## 2021-09-16 RX ADMIN — DEXTROSE MONOHYDRATE 12.5 G: 25 INJECTION, SOLUTION INTRAVENOUS at 12:27

## 2021-09-16 RX ADMIN — PANTOPRAZOLE SODIUM 40 MG: 40 INJECTION, POWDER, FOR SOLUTION INTRAVENOUS at 13:53

## 2021-09-16 RX ADMIN — CEFEPIME 2000 MG: 2 INJECTION, POWDER, FOR SOLUTION INTRAVENOUS at 20:27

## 2021-09-16 RX ADMIN — DEXTROSE MONOHYDRATE: 50 INJECTION, SOLUTION INTRAVENOUS at 15:13

## 2021-09-16 RX ADMIN — SODIUM CHLORIDE: 9 INJECTION, SOLUTION INTRAVENOUS at 11:47

## 2021-09-16 RX ADMIN — IPRATROPIUM BROMIDE AND ALBUTEROL SULFATE 1 AMPULE: .5; 3 SOLUTION RESPIRATORY (INHALATION) at 11:29

## 2021-09-16 RX ADMIN — ENOXAPARIN SODIUM 40 MG: 40 INJECTION SUBCUTANEOUS at 11:39

## 2021-09-16 RX ADMIN — DEXTROSE MONOHYDRATE 12.5 G: 25 INJECTION, SOLUTION INTRAVENOUS at 13:05

## 2021-09-16 ASSESSMENT — PAIN DESCRIPTION - DESCRIPTORS: DESCRIPTORS: ACHING

## 2021-09-16 ASSESSMENT — PAIN DESCRIPTION - FREQUENCY: FREQUENCY: INTERMITTENT

## 2021-09-16 ASSESSMENT — PAIN SCALES - GENERAL
PAINLEVEL_OUTOF10: 2
PAINLEVEL_OUTOF10: 4
PAINLEVEL_OUTOF10: 4

## 2021-09-16 ASSESSMENT — PAIN DESCRIPTION - LOCATION: LOCATION: BACK;NECK

## 2021-09-16 ASSESSMENT — PAIN DESCRIPTION - PAIN TYPE: TYPE: ACUTE PAIN

## 2021-09-16 NOTE — CONSULTS
Pulmonary & Critical Care Medicine ICU Consultation Note  Patient is being seen at the request of Mason Mcgill for a consultation for Acute Respiratory Failure with hypoxemia and hypercapnia, sepsis, and mental status change. HISTORY OF PRESENT ILLNESS:   Niurka Warner is an 27-year-old female who presents to the emergency department from [address] [place] [address] by EMS with acute respiratory distress and altered mental status. EMS found her unresponsive with a SpO2 of 58% with her home oxygen \"turned down\". Patient was discharged to nursing home from [facility] less than 24 hours prior on 9/14/2021 for HCAP with acute respiratory failure. PAST MEDICAL HISTORY:  Past Medical History:   Diagnosis Date    NADJA (acute kidney injury) ([facility] [address]     Asthma     COPD (chronic obstructive pulmonary disease) ([facility] [address]     GERD (gastroesophageal reflux disease) 9/9/2021    Hyperlipidemia     Hypertension     MRSA (methicillin resistant staph aureus) culture positive 09/22/2019    + resp cx    OA (osteoarthritis) 8/12/2014     PAST SURGICAL HISTORY:  Past Surgical History:   Procedure Laterality Date    BRONCHOSCOPY  09/08/2019    Dr. Ritu Monreal - w/BAL   [address]  09/05/2019    Dr. Seun Montes 2/24/2020    COLONOSCOPY DIAGNOSTIC performed by Eladio Crook DO at [facility] N/A 9/19/2019    OFF PUMP CORONARY ARTERY BYPASS GRAFTING X2, INTERNAL MAMMARY ARTERY, SAPHENOUS VEIN GRAFT performed by Severa Norlander, MD at [address] PARTIAL Left     SIGMOIDOSCOPY  02/27/2020    4 bands    SIGMOIDOSCOPY N/A 2/27/2020    FLEX SIG W/ BANDING SIGMOIDOSCOPY DIAGNOSTIC FLEXIBLE performed by Eladio Crook DO at [facility]:  family history includes Cancer in her mother; Heart Disease in her father and sister; Stroke in her father and sister.     SOCIAL HISTORY:   reports that she quit smoking about 1 years ago. Her smoking use included cigarettes. She has a 25.00 pack-year smoking history. She has never used smokeless tobacco.    Scheduled Meds:   atorvastatin  20 mg Oral Nightly    clopidogrel  75 mg Oral Daily    docusate sodium  100 mg Oral BID    ferrous sulfate  325 mg Oral Daily with breakfast    metoprolol tartrate  25 mg Oral BID    pantoprazole  40 mg IntraVENous Daily    Roflumilast  250 mcg Oral Daily    sertraline  50 mg Oral Daily    cefepime  2,000 mg IntraVENous Q12H    sodium chloride flush  5-40 mL IntraVENous 2 times per day    enoxaparin  40 mg SubCUTAneous Daily    ipratropium-albuterol  1 ampule Inhalation Q4H WA     Continuous Infusions:   sodium chloride      sodium chloride 100 mL/hr at 09/16/21 0559       REVIEW OF SYSTEMS:  Unable to obtain because the patient is non-communicative     Vascular access: Peripheral    MV: BiPAP     / / /FiO2 : 50 % (decreased to 50%)  Recent Labs     09/15/21  1726 09/16/21  0422   PHART 7.072* 7.311*   [id number] 125.4* 60.1*   PO2ART 138.8* 88.7       Blood pressure 124/68, pulse 94, temperature 97.5 °F (36.4 °C), temperature source Oral, resp. rate 20, SpO2 93 %, not currently breastfeeding.'  BiPAP 18/8 60% FiO2    PHYSICAL EXAM:  ,General:  ill appearing    Resp: No crackles. + wheezing. CV: S1, S2. +edema  GI: NT, ND, +BS  Skin: Warm and dry. Neuro: PERRL.  Alert and oriented to person, events    LABS:  CBC:   Recent Labs     09/14/21  0644 09/15/21  1746 09/16/21  0938   WBC 4.5 11.3* 8.6   HGB 7.5* 9.7* 8.4*   HCT 22.7* 31.9* 27.5*   MCV 92.9 94.8 94.4    490* 327     BMP:   Recent Labs     09/14/21  0644 09/15/21  1746 09/16/21  0750   * 144 143   K 4.3 5.3* 5.3*    103 105   CO2 33* 31 25   BUN 18 17 19   CREATININE 1.2 1.1 1.0     LIVER PROFILE:   Recent Labs     09/15/21  1746 09/16/21  0750   AST 19 15   ALT 10 9*   BILITOT 0.4 0.3   ALKPHOS 103 79     PT/INR: No results for input(s): PROTIME, INR in the last 72 hours. APTT: No results for input(s): APTT in the last 72 hours. UA:No results for input(s): NITRITE, COLORU, PHUR, LABCAST, WBCUA, RBCUA, MUCUS, TRICHOMONAS, YEAST, BACTERIA, CLARITYU, SPECGRAV, LEUKOCYTESUR, UROBILINOGEN, BILIRUBINUR, BLOODU, GLUCOSEU, AMORPHOUS in the last 72 hours. Invalid input(s): KETONESU  Recent Labs     09/15/21  1726 09/16/21  0422   PHART 7.072* 7.311*   [id number] 125.4* 60.1*   PO2ART 138.8* 88.7       Cultures:  9/16/2021 SARS-CoV-2  negative  9/16/2021 influenza A & B negative  Blood cultures pending    Imaging:    Head CT 9/15/2021  No acute intracranial abnormality  Partial opacification of the bilateral middle ear cavities which could  indicate otitis media. Small air-fluid levels are noted in the bilateral maxillary and sphenoid  sinuses, correlate clinically for evidence of acute sinusitis. CXR 9/15/2021  Increased interstitial opacity is found, increased when compared to the  previous exam 9/12/2021.  Focal infiltrate is not seen. Findings compatible with pulmonary interstitial edema.  Given the normal  heart size, consider both cardiogenic and noncardiogenic etiologies, including atypical infections.     ASSESSMENT:  · Acute on chronic respiratory failure with hypoxemia and hypercapnia  · Was just discharged on home BiPAP/AVAPS after seeing Julissa Kate  · Acute COPD exacerbation  · Healthcare acquired pneumonia  · Hypotensive shock/septic shock, initially fluid responsive  · Loss of IV access  · Mental status change  · Elevated troponin  · Hyperkalemia  · Leukocytosis  · Chronic anemia, normocytic  · CAD  · S/P COVID-19 vaccination    PLAN:  · BiPAP currently 18/8 50% rate increased from 16 to 20, IPAP 20, EPAP 5 given refractory hypercapnia, life-threatening respiratory failure  · Cefepime D#2 and vancomycin D#1  · Levophed for map of 65  · Solu-Medrol twice daily  · Needs emergent IV access- CVC  · Patient is on Plavix  · Prophylaxis: Lovenox, Bactroban, Protonix  · Full code: POA is Etienne    Total critical care time caring for this patient with life threatening, unstable organ failure, including direct patient contact, management of life support systems, review of data including imaging and labs, discussions with other team members and physicians is 45 minutes so far today, excluding procedures.

## 2021-09-16 NOTE — H&P
Hospital Medicine History & Physical      PCP: Nicolasa Zhang MD    Date of Admission: 9/15/2021    Date of Service: Pt seen/examined on 9/16/21  Chief Complaint:   Shortness of breath    History Of Present Illness:   [de-identified] y.o. female who presented to ER today with acute shortness of breath and change in mental status. Saturation was reported 58% at the extended care facility. Was just recently discharged to extended care facility from [facility]. 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 Remains full code at this time POA is patient's sister. Patient's niece is at bedside  Patient is somewhat lethargic with BiPAP on her. Past Medical History:          Diagnosis Date    [facility]) [facility]     Asthma     COPD (chronic obstructive pulmonary disease) ([facility]     GERD (gastroesophageal reflux disease) 9/9/2021    Hyperlipidemia     Hypertension     MRSA (methicillin resistant staph aureus) culture positive 09/22/2019    + resp cx    OA (osteoarthritis) 8/12/2014       Past Surgical History:          Procedure Laterality Date    BRONCHOSCOPY  09/08/2019    Dr. Owens Pea - w/BAL   [address]  09/05/2019    Dr. Steffi Bhat 2/24/2020    COLONOSCOPY DIAGNOSTIC performed by Shahana Castillo DO at [address] N/A 9/19/2019    OFF PUMP CORONARY ARTERY BYPASS GRAFTING X2, INTERNAL MAMMARY ARTERY, SAPHENOUS VEIN GRAFT performed by Brennan Cuba MD at [address], PARTIAL Left     SIGMOIDOSCOPY  02/27/2020    4 bands    SIGMOIDOSCOPY N/A 2/27/2020    FLEX SIG W/ BANDING SIGMOIDOSCOPY DIAGNOSTIC FLEXIBLE performed by Shahana Castillo DO at [address]       Medications Prior to Admission:      Prior to Admission medications    Medication Sig Start Date End Date Taking?  Authorizing Provider   guaiFENesin (MUCINEX) 600 MG extended release tablet Take 1 tablet by mouth 2 times daily for 3 days 9/14/21 9/17/21  Alis Lugo MD   menthol-zinc oxide (CALMOSEPTINE) 0.44-20.6 % OINT ointment Apply topically 2 times daily as needed (Apply to buttocks until resolved) Max 30 ml per day.  9/14/21   Alis Lugo MD   bisacodyl (DULCOLAX) 10 MG suppository Place 1 suppository rectally daily as needed for Constipation 9/14/21 10/14/21  Alis Lugo MD   polyethylene glycol (GLYCOLAX) 17 g packet Take 17 g by mouth 2 times daily 9/14/21   Alis Lugo MD   melatonin 5 MG TBDP disintegrating tablet Take 1 tablet by mouth nightly as needed (sleep) 9/14/21   Alis Lugo MD   fluticasone [facility]) 50 MCG/ACT nasal spray 1 spray by Each Nostril route daily as needed for Rhinitis 9/14/21   Alis Lugo MD   busPIRone (BUSPAR) 10 MG tablet Take 5 mg by mouth 3 times daily as needed Take 1 to 2 tablets 3 times daily PRN    Historical Provider, MD   pantoprazole (PROTONIX) 20 MG tablet Take 20 mg by mouth daily    Historical Provider, MD   furosemide (LASIX) 20 MG tablet Take 20 mg by mouth daily mon wed fri    Historical Provider, MD   ferrous sulfate (IRON 325) 325 (65 Fe) MG tablet Take 325 mg by mouth daily (with breakfast)    Historical Provider, MD   potassium chloride (KLOR-CON) 20 MEQ packet Take 20 mEq by mouth daily    Historical Provider, MD   Roflumilast (DALIRESP) 250 MCG tablet Take 250 mcg by mouth daily    Historical Provider, MD   acetaminophen (TYLENOL) 500 MG tablet Take 500 mg by mouth every 6 hours as needed for Pain    Historical Provider, MD   sertraline (ZOLOFT) 50 MG tablet Take 50 mg by mouth daily    Historical Provider, MD   budesonide (PULMICORT) 0.5 MG/2ML nebulizer suspension Take 2 mLs by nebulization 2 times daily 9/24/19   Marquis Ishaan MD   ipratropium-albuterol (DUONEB) 0.5-2.5 (3) MG/3ML SOLN nebulizer solution Inhale 3 mLs into the lungs every 6 hours 9/24/19   Marquis Ishaan MD atorvastatin (LIPITOR) 20 MG tablet Take 1 tablet by mouth nightly 9/24/19   Marie Anthony MD   metoprolol tartrate (LOPRESSOR) 25 MG tablet Take 1 tablet by mouth 2 times daily 9/24/19   Yolanda Smith MD   docusate sodium (COLACE, DULCOLAX) 100 MG CAPS Take 100 mg by mouth 2 times daily 9/24/19   Yolanda Smith MD   clopidogrel (PLAVIX) 75 MG tablet Take 1 tablet by mouth daily 9/7/19   Arina Blank MD   tiotropium ([place]) 18 MCG inhalation capsule Inhale 18 mcg into the lungs daily 7/25/16   Historical Provider, MD   budesonide-formoterol (SYMBICORT) 160-4.5 MCG/ACT AERO Inhale 2 puffs into the lungs 2 times daily    Historical Provider, MD   albuterol (PROVENTIL HFA) 108 (90 BASE) MCG/ACT inhaler Inhale 2 puffs into the lungs every 6 hours as needed for Wheezing or Shortness of Breath. 12/20/12   Zeina Prasad MD       Allergies:  Latex and Codeine    Social History:      The patient currently lives in a care facility currently    TOBACCO:   reports that she quit smoking about 1 years ago. Her smoking use included cigarettes. She has a 25.00 pack-year smoking history. She has never used smokeless tobacco.  ETOH:   reports no history of alcohol use. E-Cigarettes/Vaping Use     Questions Responses    E-Cigarette/Vaping Use Never Assessed    Start Date     Passive Exposure     Quit Date     Counseling Given     Comments             Family History:       Reviewed in detail and negative for DM, CAD, Cancer, CVA. Positive as follows:        Problem Relation Age of Onset    Cancer Mother     Heart Disease Father     Stroke Father     Heart Disease Sister     Stroke Sister        REVIEW OF SYSTEMS COMPLETED:   Pertinent positives as noted in the HPI. All other systems reviewed and negative.     PHYSICAL EXAM PERFORMED:    /68   Pulse 94   Temp 97.5 °F (36.4 °C) (Oral)   Resp 20   Ht 5' 2\" (1.575 m)   Wt 103 lb (46.7 kg)   SpO2 93%   BMI 18.84 kg/m²     General appearance: Elderly frail-appearing female laying in bed with BiPAP on. HEENT:  Normal cephalic, atraumatic without obvious deformity. Pupils equal, round, and reactive to light. Extra ocular muscles intact. Conjunctivae/corneas clear. Neck: Supple, with full range of motion. No jugular venous distention. Trachea midline. Respiratory: deCreased air entry. .  Cardiovascular:  Regular rate and rhythm with normal S1/S2 without murmurs, rubs or gallops. Abdomen: Soft, non-tender, non-distended with normal bowel sounds. Musculoskeletal:  No clubbing, cyanosis or edema bilaterally. Full range of motion without deformity. Skin: has bruising in upper and lower extremity. Neurologic:  Neurovascularly intact without any focal sensory/motor deficits. Cranial nerves: II-XII intact, grossly non-focal.  Psychiatric:  Alert able to assess fully since patient in respiratory distress  Capillary Refill: Brisk,3 seconds, normal  Peripheral Pulses: +2 palpable, equal bilaterally       Labs:     Recent Labs     09/14/21  0644 09/15/21  1746 09/16/21  0938   WBC 4.5 11.3* 8.6   HGB 7.5* 9.7* 8.4*   HCT 22.7* 31.9* 27.5*    490* 327     Recent Labs     09/14/21  0644 09/15/21  1746 09/16/21  0750   * 144 143   K 4.3 5.3* 5.3*    103 105   CO2 33* 31 25   BUN 18 17 19   CREATININE 1.2 1.1 1.0   CALCIUM 9.3 9.6 9.2     Recent Labs     09/15/21  1746 09/16/21  0750   AST 19 15   ALT 10 9*   BILITOT 0.4 0.3   ALKPHOS 103 79       Recent Labs     09/15/21  1746 09/16/21  0500 09/16/21  0938   TROPONINI 0.07* 0.05* 0.06*             CT HEAD WO CONTRAST   Final Result   No acute intracranial abnormality. Chronic and age related changes including age-appropriate cerebral volume   loss and scattered nonspecific white matter hypodensities which are likely   the sequela of chronic small vessel ischemic disease. Partial opacification of the bilateral middle ear cavities which could   indicate otitis media.       Small air-fluid levels are noted in the bilateral maxillary and sphenoid   sinuses, correlate clinically for evidence of acute sinusitis. XR CHEST PORTABLE   Final Result   Findings compatible with pulmonary interstitial edema. Given the normal   heart size, consider both cardiogenic and noncardiogenic etiologies,   including atypical infections. XR CHEST PORTABLE    (Results Pending)       ASSESSMENT:    Active Hospital Problems    Diagnosis Date Noted    Acute respiratory failure with hypoxia and hypercapnia (HCC) [J96.01, J96.02] 09/16/2021     Acute Respiratory failure on chronic. Hypercapnia. With end-stage COPD with acute exacerbation  Continue BiPAP consider changing to AVAPS. Keep on intravenous steroid  Antibiotic( cefepem/vanco D)1  Mucinex  Daliresp  Pulmonary consult  ICU admission    Pneumonia healthcare acquired procalcitonin is 1.68  Will start on IV antibiotic      Anemia  Get iron studies       CAD/HTN- continued home BB, Clopidogrel, atorvastatin  Mildly elevated troponin likely type II demand based ischemia     Mood disorder continued home sertraline     GERD- continued home PPI    PLAN:  DVT Prophylaxis:lov  Diet: Diet NPO  Code Status: Full Code    PT/OT Eval Status:p    Dispo - ICU    crcaretime > 30min due tolife threatening resp/acuteorgan dysfucntiion   Helio Preston MD    Thank you Larisa Kitchen MD for the opportunity to be involved in this patient's care. If you have any questions or concerns please feel free to contact me at [phone].

## 2021-09-16 NOTE — PROGRESS NOTES
I spoke directly with admitting hospitalist early this am.  Consult was paged as notification, \"no call back needed. \"

## 2021-09-16 NOTE — PROGRESS NOTES
Pt remains stable. Dr Cavanaugh Remedies rounding, fluids changed. To get EKG to f/u with troponin trending up.

## 2021-09-16 NOTE — CARE COORDINATION
Readmission Assessment  Number of Days since last admission?: 1-7 days (Pt was at [facility] 9/9/2021-9/14/2021.)  Previous Disposition: SNF (pt discharged to the [facility] on 09/14/2021 from [facility])  Who is being Interviewed: Caregiver (pt's daughter Bridget Avery)  What was the patient's/caregiver's perception as to why they think they needed to return back to the hospital?: Other (Comment) (Pt's daugther stated that pt's o2 dropped and they SNF sent her back to the hospital. She stated this happened the last 2 times.)  Did you visit your Primary Care Physician after you left the hospital, before you returned this time?: No  Why weren't you able to visit your PCP?: Other (Comment) (pt was at a SNF)  Did you see a specialist, such as Cardiac, Pulmonary, Orthopedic Physician, etc. after you left the hospital?: No  Who advised the patient to return to the hospital?: Skilled Unit  Does the patient report anything that got in the way of taking their medications?: Yes  What reasons did they give?: Other (Comment) (Pt's daughter stated that the SNF didn't have pt's medications and they had to go get them from home to take to the SNF.)  In our efforts to provide the best possible care to you and others like you, can you think of anything that we could have done to help you after you left the hospital the first time, so that you might not have needed to return so soon?: Other (Comment) (Pt's daughter stated more communication and wanting answers, along with how to keep it from happening.)

## 2021-09-16 NOTE — PLAN OF CARE
[de-identified] yoF p/w MS change. She was just DC yd (9/14) from [facility] for resp fail/HCAP. No bld cx in ED.       Sepsis  Acute resp fail hypox and hypercapnia  HCAP  MS change  Indeterminate troponin

## 2021-09-16 NOTE — ED PROVIDER NOTES
CHIEF COMPLAINT  Altered Mental Status (Pt BIBA from ECF. Pt has been at [facility] for less than 24 hours. EMS was called for respiratory distress. EMS states pt's O2 was 58% with O2 turned down. EMS states pt was unresponsive when they arrived. EMS states that pt was more alert upon arrival to ED.)      HISTORY OF PRESENT ILLNESS  Gary Hook is a [de-identified] y.o. female with history of NADJA, COPD, GERD, hypertension, hyperlipidemia, who presents to the ED complaining of altered mental status. Patient reportedly was recently admitted for HCAP pneumonia with respiratory failure. She was discharged to nursing home for less than 24 hours reportedly was found unresponsive. Upon EMS arrival, patient had O2 sats of 58%. Oxygen had possibly been \"turned down\" though no further details of specifics were available. Patient arrives into the emergency department with altered mental status and acute respiratory distress. No other complaints, modifying factors or associated symptoms. I have reviewed the following from the nursing documentation.     Past Medical History:   Diagnosis Date    [facility] injury) ([facility] [address]     Asthma     COPD (chronic obstructive pulmonary disease) ([facility] [address] [facility]     GERD (gastroesophageal reflux disease) 9/9/2021    Hyperlipidemia     Hypertension     MRSA (methicillin resistant staph aureus) culture positive 09/22/2019    + resp cx    OA (osteoarthritis) 8/12/2014     Past Surgical History:   Procedure Laterality Date    BRONCHOSCOPY  09/08/2019    Dr. Kortney Greenberg - w/BAL   [address]  09/05/2019    Dr. Nando Del Castillo 2/24/2020    COLONOSCOPY DIAGNOSTIC performed by Rima Michelle DO at [address] N/A 9/19/2019    OFF PUMP CORONARY ARTERY BYPASS GRAFTING X2, INTERNAL MAMMARY ARTERY, SAPHENOUS VEIN GRAFT performed by Saranya Nelson MD at [address], PARTIAL Left     SIGMOIDOSCOPY Abused:     Sexually Abused:      Current Facility-Administered Medications   Medication Dose Route Frequency Provider Last Rate Last Admin    ondansetron (ZOFRAN) injection 4 mg  4 mg IntraVENous Q6H PRN Eryn Khan DO   4 mg at 09/15/21 1828     Current Outpatient Medications   Medication Sig Dispense Refill    guaiFENesin (MUCINEX) 600 MG extended release tablet Take 1 tablet by mouth 2 times daily for 3 days 6 tablet 0    menthol-zinc oxide (CALMOSEPTINE) 0.44-20.6 % OINT ointment Apply topically 2 times daily as needed (Apply to buttocks until resolved) Max 30 ml per day.  1 g 0    bisacodyl (DULCOLAX) 10 MG suppository Place 1 suppository rectally daily as needed for Constipation      polyethylene glycol (GLYCOLAX) 17 g packet Take 17 g by mouth 2 times daily 527 g 0    melatonin 5 MG TBDP disintegrating tablet Take 1 tablet by mouth nightly as needed (sleep)      fluticasone (FLONASE) 50 MCG/ACT nasal spray 1 spray by Each Nostril route daily as needed for Rhinitis 16 g 0    busPIRone (BUSPAR) 10 MG tablet Take 5 mg by mouth 3 times daily as needed Take 1 to 2 tablets 3 times daily PRN      pantoprazole (PROTONIX) 20 MG tablet Take 20 mg by mouth daily      furosemide (LASIX) 20 MG tablet Take 20 mg by mouth daily mon wed fri      ferrous sulfate (IRON 325) 325 (65 Fe) MG tablet Take 325 mg by mouth daily (with breakfast)      potassium chloride (KLOR-CON) 20 MEQ packet Take 20 mEq by mouth daily      Roflumilast (DALIRESP) 250 MCG tablet Take 250 mcg by mouth daily      acetaminophen (TYLENOL) 500 MG tablet Take 500 mg by mouth every 6 hours as needed for Pain      sertraline (ZOLOFT) 50 MG tablet Take 50 mg by mouth daily      budesonide (PULMICORT) 0.5 MG/2ML nebulizer suspension Take 2 mLs by nebulization 2 times daily 60 ampule 3    ipratropium-albuterol (DUONEB) 0.5-2.5 (3) MG/3ML SOLN nebulizer solution Inhale 3 mLs into the lungs every 6 hours 360 mL 0    atorvastatin (LIPITOR) 20 MG tablet Take 1 tablet by mouth nightly 30 tablet 3    metoprolol tartrate (LOPRESSOR) 25 MG tablet Take 1 tablet by mouth 2 times daily 60 tablet 3    docusate sodium (COLACE, DULCOLAX) 100 MG CAPS Take 100 mg by mouth 2 times daily 30 capsule 0    clopidogrel (PLAVIX) 75 MG tablet Take 1 tablet by mouth daily 30 tablet 3    tiotropium (SPIRIVA HANDIHALER) 18 MCG inhalation capsule Inhale 18 mcg into the lungs daily      budesonide-formoterol (SYMBICORT) 160-4.5 MCG/ACT AERO Inhale 2 puffs into the lungs 2 times daily      albuterol (PROVENTIL HFA) 108 (90 BASE) MCG/ACT inhaler Inhale 2 puffs into the lungs every 6 hours as needed for Wheezing or Shortness of Breath. 1 Inhaler 2     Allergies   Allergen Reactions    Latex      Burning      Codeine      \"hallucinations\"       REVIEW OF SYSTEMS  10 systems reviewed, pertinent positives per HPI otherwise noted to be negative. PHYSICAL EXAM  BP (!) 114/58   Pulse 93   Temp 98 °F (36.7 °C) (Oral)   Resp 24   SpO2 97%   GENERAL APPEARANCE: Awake and alert. Cooperative. Respiratory distress noted. HEAD: Normocephalic. Atraumatic. EYES: . EOM's grossly intact. Anisocoria. ENT: Mucous membranes are moist.   NECK: Supple, trachea midline. HEART: RRR. Normal S1, S2. No murmurs, rubs or gallops. LUNGS: Respirations unlabored. CTAB. Good air exchange. No wheezes, rales, or rhonchi. Speaking comfortably in full sentences. ABDOMEN: Soft. Non-distended. Non-tender. No guarding or rebound. Normal Bowel sounds. EXTREMITIES: No peripheral edema. MAEE. No acute deformities. SKIN: Warm and dry. No acute rashes. NEUROLOGICAL: Alert and oriented X 3. CN II-XII intact. No gross facial drooping. Strength 5/5, sensation intact. No pronator drift. Normal coordination. PSYCHIATRIC: Normal mood and affect. LABS  I have reviewed all labs for this visit.    Results for orders placed or performed during the hospital encounter of 09/15/21   Blood gas, arterial Result Value Ref Range    pH, Arterial 7.072 (LL) 7.350 - 7.450    pCO2, Arterial 125.4 (HH) 35.0 - 45.0 mmHg    pO2, Arterial 138.8 (H) 75.0 - 108.0 mmHg    HCO3, Arterial 35.7 (H) 21.0 - 29.0 mmol/L    Base Excess, Arterial 3.2 (H) -3.0 - 3.0 mmol/L    Hemoglobin, Art, Extended 9.9 (L) 12.0 - 16.0 g/dL    O2 Sat, Arterial 97.3 >92 %    Carboxyhgb, Arterial 0.3 0.0 - 1.5 %    Methemoglobin, Arterial 0.1 <1.5 %    TCO2, Arterial 39.5 Not Established mmol/L    O2 Therapy Unknown    CBC auto differential   Result Value Ref Range    WBC 11.3 (H) 4.0 - 11.0 K/uL    RBC 3.36 (L) 4.00 - 5.20 M/uL    Hemoglobin 9.7 (L) 12.0 - 16.0 g/dL    Hematocrit 31.9 (L) 36.0 - 48.0 %    MCV 94.8 80.0 - 100.0 fL    MCH 28.9 26.0 - 34.0 pg    MCHC 30.5 (L) 31.0 - 36.0 g/dL    RDW 19.0 (H) 12.4 - 15.4 %    Platelets 591 (H) 306 - 450 K/uL    MPV 8.1 5.0 - 10.5 fL    PLATELET SLIDE REVIEW Clumped     SLIDE REVIEW see below     Neutrophils % 78.0 %    Lymphocytes % 12.0 %    Monocytes % 7.0 %    Eosinophils % 0.0 %    Basophils % 0.0 %    Neutrophils Absolute 8.8 (H) 1.7 - 7.7 K/uL    Lymphocytes Absolute 1.7 1.0 - 5.1 K/uL    Monocytes Absolute 0.8 0.0 - 1.3 K/uL    Eosinophils Absolute 0.0 0.0 - 0.6 K/uL    Basophils Absolute 0.0 0.0 - 0.2 K/uL    Atypical Lymphocytes Relative 3 0 - 6 %    nRBC 1 (A) /100 WBC    Anisocytosis Occasional (A)     Polychromasia Occasional (A)     Poikilocytes Occasional (A)    Comprehensive Metabolic Panel w/ Reflex to MG   Result Value Ref Range    Sodium 144 136 - 145 mmol/L    Potassium reflex Magnesium 5.3 (H) 3.5 - 5.1 mmol/L    Chloride 103 99 - 110 mmol/L    CO2 31 21 - 32 mmol/L    Anion Gap 10 3 - 16    Glucose 187 (H) 70 - 99 mg/dL    BUN 17 7 - 20 mg/dL    CREATININE 1.1 0.6 - 1.2 mg/dL    GFR Non- 48 (A) >60    GFR  58 (A) >60    Calcium 9.6 8.3 - 10.6 mg/dL    Total Protein 7.1 6.4 - 8.2 g/dL    Albumin 3.7 3.4 - 5.0 g/dL    Albumin/Globulin Ratio 1.1 1.1 - 2.2 Total Bilirubin 0.4 0.0 - 1.0 mg/dL    Alkaline Phosphatase 103 40 - 129 U/L    ALT 10 10 - 40 U/L    AST 19 15 - 37 U/L    Globulin 3.4 g/dL   Troponin   Result Value Ref Range    Troponin 0.07 (H) <0.01 ng/mL   EKG 12 Lead   Result Value Ref Range    Ventricular Rate 106 BPM    Atrial Rate 106 BPM    P-R Interval 132 ms    QRS Duration 86 ms    Q-T Interval 322 ms    QTc Calculation (Bazett) 427 ms    P Axis 80 degrees    R Axis -54 degrees    T Axis 80 degrees    Diagnosis       Sinus tachycardiaPossible Left atrial enlargementLeft axis deviationSeptal infarct , age undeterminedAbnormal ECGWhen compared with ECG of 09-SEP-2021 18:20,Premature atrial complexes are no longer PresentT wave amplitude has increased in Anterior leads       EKG        RADIOLOGY  X-RAYS:  I have reviewed radiologic plain film image(s). ALL OTHER NON-PLAIN FILM IMAGES SUCH AS CT, ULTRASOUND AND MRI HAVE BEEN READ BY THE RADIOLOGIST. CT HEAD WO CONTRAST   Final Result   No acute intracranial abnormality. Chronic and age related changes including age-appropriate cerebral volume   loss and scattered nonspecific white matter hypodensities which are likely   the sequela of chronic small vessel ischemic disease. Partial opacification of the bilateral middle ear cavities which could   indicate otitis media. Small air-fluid levels are noted in the bilateral maxillary and sphenoid   sinuses, correlate clinically for evidence of acute sinusitis. XR CHEST PORTABLE   Final Result   Findings compatible with pulmonary interstitial edema. Given the normal   heart size, consider both cardiogenic and noncardiogenic etiologies,   including atypical infections. Rechecks: Physical assessment performed. 1744: O2 sats remain 100%. Respiratory rate 18.    1946: Patient resting comfortably. BiPAP in place. 2101: Patient remained stable at this time. ED COURSE/MDM  Patient seen and evaluated.  Old records reviewed. Labs and imaging reviewed and results discussed with patient. Patient was given BiPAP therapy with broad-spectrum antibiotics in the ED with good symptomatic relief. Patient was reassessed as noted above . Patient will be admitted for further evaluation and treatment. Plan of care discussed with patient and family. Patient and family in agreement with plan. Patient was given scripts for the following medications. I counseled patient how to take these medications. New Prescriptions    No medications on file       CLINICAL IMPRESSION  1. Acute respiratory failure with hypoxia ([facility] [address]    2. Pneumonia due to infectious organism, unspecified laterality, unspecified part of lung        Blood pressure (!) 114/58, pulse 93, temperature 98 °F (36.7 °C), temperature source Oral, resp. rate 24, SpO2 97 %, not currently breastfeeding. [place] was admitted in guarded condition on BiPAP.        Christine Medina,   09/16/21 0038

## 2021-09-16 NOTE — CONSULTS
09/16/21 103 lb (46.7 kg)       Body mass index is 18.84 kg/m². Estimated Creatinine Clearance: 33 mL/min (based on SCr of 1 mg/dL). Goal Trough Level: 15-20 mcg/mL    Assessment/Plan:  Please give vancomycin 1 gram ivpb x1 dose now. Please draw vancomycin trough at 0600 on 9-17-21 and pulse dose vancomycin per pharmacy protocol. Thank you for the consult. Will continue to follow. [address]. Ph.  9/16/2021 10:53 AM

## 2021-09-16 NOTE — PROGRESS NOTES
Pt transported to same day surgery (surge overflow) on 50% venti mask with Spo2 greater than 95% throughout. Pt c/o SOB upon arrival and pt placed back on Bipap at 18/8 and 60%. Pt tolerating well with Spo2 95%.

## 2021-09-16 NOTE — PROGRESS NOTES
4 Eyes Skin Assessment     The patient is being assess for   Admission    I agree that 2 RN's have performed a thorough Head to Toe Skin Assessment on the patient. ALL assessment sites listed below have been assessed. Areas assessed for pressure by both nurses:   [x]   Head, Face, and Ears   [x]   Shoulders, Back, and Chest, Abdomen  [x]   Arms, Elbows, and Hands   [x]   Coccyx, Sacrum, and Ischium  [x]   Legs, Feet, and Heels        Skin Assessed Under all Medical Devices by both nurses:  O2 device tubing, Bipap mask and bond              All Mepilex Borders were peeled back and area peeked at by both nurses:  Yes  Please list where Mepilex Borders are located:  elbow right, buttock               **SHARE this note so that the co-signing nurse is able to place an eSignature**    Co-signer eSignature: Electronically signed by Kirstie Sanchez RN on 9/16/21 at 6:45 PM EDT    Does the Patient have Skin Breakdown related to pressure?   Yes LDA WOUND CARE was Initiated documentation include the Amanda-wound, Wound Assessment, Measurements, Dressing Treatment, Drainage, and Color\",           Trey Prevention initiated:  Yes   Wound Care Orders initiated:  NA      [facility] nurse consulted for Pressure Injury (Stage 3,4, Unstageable, DTI, NWPT, Complex wounds)and New or Established Ostomies:  Yes      Primary Nurse eSignature: Electronically signed by Candice Keen RN on 9/16/21 at 9:17 AM EDT

## 2021-09-16 NOTE — PROCEDURES
Procedure: central venous access, RIJ    Indication: I was called emergently to the bedside after the PICC team indicated that they would not be placing a PICC line in this critically ill patient with hypotensive shock and who lacked alternative IV access. The PICC team had recommended calling in an on-call service, but this was certainly not an appropriate option in this critically ill hypotensive patient. Informed Consent: emergent, hypotensive patient, I was called when patient had no IV access. Time Out: taken    Procedure: Sterile prep with chlorhexidine. Full maximum sterile field/barrier technique was followed (with cap and mask and sterile gown and large sterile sheet and hand hygeine and 2% chlorhexidine). Aqueous lidocaine anesthetic. Direct ultrasound visualization of the right internal jugular vein, with placement of central venous catheter using modified seldinger technique. Good dark venous blood from all 3 lumens. CXR pending. No immediate complication. Recommendation: remove central line at earliest time feasible to mitigate infectious risks    This procedure was performed by the physician working directly with the PA at each step.

## 2021-09-16 NOTE — CARE COORDINATION
Case Management Assessment  Initial Evaluation      Patient Name: Leroy Mccray  YOB: 1940  Diagnosis: Acute respiratory failure with hypoxia and [facility]capnia [facility] [J96.01, J96.02]  Date / Time: 9/15/2021  5:28 PM    Admission status/Date: 09/16/2021 Inpatient   Chart Reviewed: Yes      Patient Riddhi Chacon: No-pt not in room in ED   Family Interviewed:  Yes - pt's daughter Aaron Dunbar at [phone]      Hospitalization in the last 30 days:  Yes from 09/09/2021-09/14/2021 at [facility] with elevated troponin. Pt discharged to [facility]F on 09/14/2021. Health Care Decision Maker :   Primary Decision Maker: Kingsley Tinajero Child - [phone]    Secondary Decision Maker: Hafsa Tapia - Niece/Nephew - [phone]    (CM - must 1st enter selection under Navigator - emergency contact- [place] Relationship and pick relationship)   Who do you trust or have selected to make healthcare decisions for you    Met with: pt's daughter Gavin Broderick conducted  (bedside/phone): phone call     Current PCP: Mary Suarez MD    Financial  Medicare  Precert required for SNF : N          3 night stay required - N-WAIVED    ADLS  Support Systems/Care Needs:    Transportation: family    Meal Preparation: self and family    Housing  Living Arrangements: Pt lives at home in a ranch style house. Her son is with her during the week and her daughter is with her on the weekends. Prior to the hospital stay, pt was at the [facility] for rehab.   Steps: 1  Intent for return to present living arrangements: TBD-pt's daughter is interested in taking pt home with home health care rather than SNF  Identified Issues[id number] [address] : No but has had [facility]) in the past Agency:(Services)     Passport/Waiver : No  :                      Phone Number:    Passport/Waiver Services: n/a Durable Medical Equiptment   DME Provider: [employer]   Equipment:   Walker__x_Cane___RTS___ BSC_x__Shower Chair__x_Hospital Bed_x__W/C__x__Other________  02 at __2- 2 1/2 __Liter(s)---wears(frequency)_cont______ HHN ___ CPAP___ BiPap___   N/A____      Home O2 Use :  Yes    If No for home O2---if presently on O2 during hospitalization:  Yes  if yes CM to follow for potential DC O2 need  Informed of need for care provider to bring portable home O2 tank on day of discharge for nursing to connect prior to leaving:   Yes  Verbalized agreement/Understanding:   Yes    Community Service Affiliation  Dialysis:  No    · Agency:  · Location:  · Dialysis Schedule:  · Phone:   · Fax: Other Community Services: n/a    DISCHARGE PLAN: Explained Case Management role/services. Chart review completed. Attempted meeting with pt at bedside in the ED but she was off the floor. Called and spoke with pt's daughter Tom Jiang who completed the assessment. She stated pt was just at the [facility] for 1 day prior to being sent back to the hospital. She stated this is the second time this has happened. She stated they are interested in taking pt home with [facility] and was made aware [facility] won't come out daily, along with pt getting more therapy at the SNF. She is aware PT/OT may work with pt and CM will follow for recommendations to address when appropriate. She is aware private duty [facility] is an option if able to pay for it; resources placed on the AVS. She stated that [employer] has called her about needing a paper signed which pt's PCP stated he would sign. She also stated it pt goes home then they will need a bipap or cpap. Explained that this would need to be recommended by the MD and ordered through a DME company. She stated they would want to use [employer] since her o2 is through them.  She also stated they want more notice regarding the discharge, explained a discharge can come at anytime from the MD if they determine she is medically stable for discharge. Encouraged her to speak with RN and MD's to get an update on pt throughout pt's hospital stay and she stated agreement. She denied needs or questions for CM. [employer] with [employer] aware of the above. He stated it pt needs a bipap for home, an order is needed from the MD, along with the blood gas and overnight pulse ox. If pt needs a Cpap, then she would need a sleep study done outpatient. PT/OT will be needed when appropriate. CM will follow. Please notify CM if needs or concerns arise.

## 2021-09-16 NOTE — PROGRESS NOTES
09/16/21 1926   NIV Type   $NIV $Daily Charge   Equipment Type V60   Mode Bilevel   Mask Type Full face mask   Mask Size Medium   Settings/Measurements   IPAP 20 cmH20   CPAP/EPAP 5 cmH2O   Rate Ordered 22   Resp 24   FiO2  45 %   Vt Exhaled 605 mL   Mask Leak (lpm) 29 lpm   Comfort Level Good   Using Accessory Muscles No   SpO2 99   Breath Sounds   Right Upper Lobe Rhonchi   Right Middle Lobe Rhonchi   Right Lower Lobe Rhonchi   Left Upper Lobe Rhonchi   Left Lower Lobe Rhonchi   Alarm Settings   Alarms On Y

## 2021-09-16 NOTE — PROGRESS NOTES
Page to Dr Lopez Seat regarding pt having 2 u/s placed IVs infiltrating. Call placed to Rodriguez Rodriguez who is aware at minimum will need another PIV placed, will ask about PICC placement.  Will place order for PICC

## 2021-09-17 ENCOUNTER — APPOINTMENT (OUTPATIENT)
Dept: GENERAL RADIOLOGY | Age: 81
DRG: 177 | End: 2021-09-17
Payer: MEDICARE

## 2021-09-17 LAB
ANION GAP SERPL CALCULATED.3IONS-SCNC: 8 MMOL/L (ref 3–16)
BACTERIA: ABNORMAL /HPF
BASE EXCESS ARTERIAL: 2 MMOL/L (ref -3–3)
BASOPHILS ABSOLUTE: 0 K/UL (ref 0–0.2)
BASOPHILS RELATIVE PERCENT: 0 %
BILIRUBIN URINE: NEGATIVE
BLOOD, URINE: ABNORMAL
BUN BLDV-MCNC: 18 MG/DL (ref 7–20)
CALCIUM SERPL-MCNC: 8.5 MG/DL (ref 8.3–10.6)
CARBOXYHEMOGLOBIN ARTERIAL: 0.3 % (ref 0–1.5)
CHLORIDE BLD-SCNC: 106 MMOL/L (ref 99–110)
CLARITY: CLEAR
CO2: 26 MMOL/L (ref 21–32)
COLOR: YELLOW
CREAT SERPL-MCNC: 1 MG/DL (ref 0.6–1.2)
EOSINOPHILS ABSOLUTE: 0 K/UL (ref 0–0.6)
EOSINOPHILS RELATIVE PERCENT: 0 %
EPITHELIAL CELLS, UA: ABNORMAL /HPF (ref 0–5)
ESTIMATED AVERAGE GLUCOSE: 96.8 MG/DL
GFR AFRICAN AMERICAN: >60
GFR NON-AFRICAN AMERICAN: 53
GLUCOSE BLD-MCNC: 136 MG/DL (ref 70–99)
GLUCOSE BLD-MCNC: 148 MG/DL (ref 70–99)
GLUCOSE BLD-MCNC: 153 MG/DL (ref 70–99)
GLUCOSE BLD-MCNC: 184 MG/DL (ref 70–99)
GLUCOSE BLD-MCNC: 255 MG/DL (ref 70–99)
GLUCOSE BLD-MCNC: 272 MG/DL (ref 70–99)
GLUCOSE URINE: NEGATIVE MG/DL
HBA1C MFR BLD: 5 %
HCO3 ARTERIAL: 26.6 MMOL/L (ref 21–29)
HCT VFR BLD CALC: 22.6 % (ref 36–48)
HEMOGLOBIN, ART, EXTENDED: 8.4 G/DL (ref 12–16)
HEMOGLOBIN: 7 G/DL (ref 12–16)
KETONES, URINE: 15 MG/DL
LEUKOCYTE ESTERASE, URINE: ABNORMAL
LYMPHOCYTES ABSOLUTE: 0.2 K/UL (ref 1–5.1)
LYMPHOCYTES RELATIVE PERCENT: 2.6 %
MCH RBC QN AUTO: 28.7 PG (ref 26–34)
MCHC RBC AUTO-ENTMCNC: 31.2 G/DL (ref 31–36)
MCV RBC AUTO: 92.3 FL (ref 80–100)
METHEMOGLOBIN ARTERIAL: 0.3 %
MICROSCOPIC EXAMINATION: YES
MONOCYTES ABSOLUTE: 0 K/UL (ref 0–1.3)
MONOCYTES RELATIVE PERCENT: 0.3 %
NEUTROPHILS ABSOLUTE: 7.8 K/UL (ref 1.7–7.7)
NEUTROPHILS RELATIVE PERCENT: 97.1 %
NITRITE, URINE: NEGATIVE
O2 SAT, ARTERIAL: 98.4 %
O2 THERAPY: ABNORMAL
PCO2 ARTERIAL: 41.8 MMHG (ref 35–45)
PDW BLD-RTO: 18.2 % (ref 12.4–15.4)
PERFORMED ON: ABNORMAL
PH ARTERIAL: 7.42 (ref 7.35–7.45)
PH UA: 5.5 (ref 5–8)
PLATELET # BLD: 291 K/UL (ref 135–450)
PMV BLD AUTO: 7.9 FL (ref 5–10.5)
PO2 ARTERIAL: 120.5 MMHG (ref 75–108)
POTASSIUM REFLEX MAGNESIUM: 3.9 MMOL/L (ref 3.5–5.1)
PROTEIN UA: 30 MG/DL
RBC # BLD: 2.45 M/UL (ref 4–5.2)
RBC UA: ABNORMAL /HPF (ref 0–4)
SODIUM BLD-SCNC: 140 MMOL/L (ref 136–145)
SPECIFIC GRAVITY UA: 1.01 (ref 1–1.03)
TCO2 ARTERIAL: 27.9 MMOL/L
URINE REFLEX TO CULTURE: YES
URINE TYPE: ABNORMAL
UROBILINOGEN, URINE: 0.2 E.U./DL
VANCOMYCIN TROUGH: 15 UG/ML (ref 10–20)
WBC # BLD: 8.1 K/UL (ref 4–11)
WBC UA: ABNORMAL /HPF (ref 0–5)
YEAST: PRESENT /HPF

## 2021-09-17 PROCEDURE — 94640 AIRWAY INHALATION TREATMENT: CPT

## 2021-09-17 PROCEDURE — 6370000000 HC RX 637 (ALT 250 FOR IP): Performed by: INTERNAL MEDICINE

## 2021-09-17 PROCEDURE — 94761 N-INVAS EAR/PLS OXIMETRY MLT: CPT

## 2021-09-17 PROCEDURE — 94660 CPAP INITIATION&MGMT: CPT

## 2021-09-17 PROCEDURE — 2000000000 HC ICU R&B

## 2021-09-17 PROCEDURE — 99233 SBSQ HOSP IP/OBS HIGH 50: CPT | Performed by: INTERNAL MEDICINE

## 2021-09-17 PROCEDURE — 6360000002 HC RX W HCPCS: Performed by: HOSPITALIST

## 2021-09-17 PROCEDURE — 81001 URINALYSIS AUTO W/SCOPE: CPT

## 2021-09-17 PROCEDURE — C9113 INJ PANTOPRAZOLE SODIUM, VIA: HCPCS | Performed by: INTERNAL MEDICINE

## 2021-09-17 PROCEDURE — 80048 BASIC METABOLIC PNL TOTAL CA: CPT

## 2021-09-17 PROCEDURE — 93971 EXTREMITY STUDY: CPT

## 2021-09-17 PROCEDURE — 2700000000 HC OXYGEN THERAPY PER DAY

## 2021-09-17 PROCEDURE — 6370000000 HC RX 637 (ALT 250 FOR IP): Performed by: HOSPITALIST

## 2021-09-17 PROCEDURE — 2580000003 HC RX 258: Performed by: INTERNAL MEDICINE

## 2021-09-17 PROCEDURE — 85025 COMPLETE CBC W/AUTO DIFF WBC: CPT

## 2021-09-17 PROCEDURE — 87086 URINE CULTURE/COLONY COUNT: CPT

## 2021-09-17 PROCEDURE — 6360000002 HC RX W HCPCS: Performed by: INTERNAL MEDICINE

## 2021-09-17 PROCEDURE — 71045 X-RAY EXAM CHEST 1 VIEW: CPT

## 2021-09-17 PROCEDURE — 80202 ASSAY OF VANCOMYCIN: CPT

## 2021-09-17 RX ADMIN — IPRATROPIUM BROMIDE AND ALBUTEROL SULFATE 1 AMPULE: .5; 3 SOLUTION RESPIRATORY (INHALATION) at 23:28

## 2021-09-17 RX ADMIN — IPRATROPIUM BROMIDE AND ALBUTEROL SULFATE 1 AMPULE: .5; 3 SOLUTION RESPIRATORY (INHALATION) at 08:11

## 2021-09-17 RX ADMIN — CEFEPIME 2000 MG: 2 INJECTION, POWDER, FOR SOLUTION INTRAVENOUS at 10:07

## 2021-09-17 RX ADMIN — METOPROLOL TARTRATE 25 MG: 25 TABLET, FILM COATED ORAL at 13:18

## 2021-09-17 RX ADMIN — ROFLUMILAST 250 MCG: 500 TABLET ORAL at 10:42

## 2021-09-17 RX ADMIN — SODIUM CHLORIDE, PRESERVATIVE FREE 10 ML: 5 INJECTION INTRAVENOUS at 21:32

## 2021-09-17 RX ADMIN — DEXTROSE AND SODIUM CHLORIDE: 5; 450 INJECTION, SOLUTION INTRAVENOUS at 04:20

## 2021-09-17 RX ADMIN — ONDANSETRON HYDROCHLORIDE 4 MG: 2 INJECTION, SOLUTION INTRAMUSCULAR; INTRAVENOUS at 21:04

## 2021-09-17 RX ADMIN — SODIUM CHLORIDE, PRESERVATIVE FREE 10 ML: 5 INJECTION INTRAVENOUS at 10:43

## 2021-09-17 RX ADMIN — MICONAZOLE NITRATE: 2 OINTMENT TOPICAL at 13:18

## 2021-09-17 RX ADMIN — VANCOMYCIN HYDROCHLORIDE 750 MG: 750 INJECTION, POWDER, LYOPHILIZED, FOR SOLUTION INTRAVENOUS at 06:24

## 2021-09-17 RX ADMIN — METHYLPREDNISOLONE SODIUM SUCCINATE 40 MG: 40 INJECTION, POWDER, FOR SOLUTION INTRAMUSCULAR; INTRAVENOUS at 13:33

## 2021-09-17 RX ADMIN — SODIUM CHLORIDE, PRESERVATIVE FREE 10 ML: 5 INJECTION INTRAVENOUS at 09:41

## 2021-09-17 RX ADMIN — IPRATROPIUM BROMIDE AND ALBUTEROL SULFATE 1 AMPULE: .5; 3 SOLUTION RESPIRATORY (INHALATION) at 12:14

## 2021-09-17 RX ADMIN — ENOXAPARIN SODIUM 40 MG: 40 INJECTION SUBCUTANEOUS at 09:41

## 2021-09-17 RX ADMIN — SERTRALINE HYDROCHLORIDE 50 MG: 50 TABLET ORAL at 10:42

## 2021-09-17 RX ADMIN — METOPROLOL TARTRATE 25 MG: 25 TABLET, FILM COATED ORAL at 21:24

## 2021-09-17 RX ADMIN — GUAIFENESIN 600 MG: 600 TABLET, EXTENDED RELEASE ORAL at 21:21

## 2021-09-17 RX ADMIN — MICONAZOLE NITRATE: 2 OINTMENT TOPICAL at 21:32

## 2021-09-17 RX ADMIN — INSULIN LISPRO 1 UNITS: 100 INJECTION, SOLUTION INTRAVENOUS; SUBCUTANEOUS at 21:24

## 2021-09-17 RX ADMIN — ATORVASTATIN CALCIUM 20 MG: 10 TABLET, FILM COATED ORAL at 21:20

## 2021-09-17 RX ADMIN — DOCUSATE SODIUM 100 MG: 100 CAPSULE, LIQUID FILLED ORAL at 21:21

## 2021-09-17 RX ADMIN — GUAIFENESIN 600 MG: 600 TABLET, EXTENDED RELEASE ORAL at 11:10

## 2021-09-17 RX ADMIN — INSULIN LISPRO 3 UNITS: 100 INJECTION, SOLUTION INTRAVENOUS; SUBCUTANEOUS at 09:42

## 2021-09-17 RX ADMIN — FERROUS SULFATE TAB 325 MG (65 MG ELEMENTAL FE) 325 MG: 325 (65 FE) TAB at 10:42

## 2021-09-17 RX ADMIN — SODIUM CHLORIDE, PRESERVATIVE FREE 10 ML: 5 INJECTION INTRAVENOUS at 21:05

## 2021-09-17 RX ADMIN — CLOPIDOGREL BISULFATE 75 MG: 75 TABLET ORAL at 10:42

## 2021-09-17 RX ADMIN — CEFEPIME 2000 MG: 2 INJECTION, POWDER, FOR SOLUTION INTRAVENOUS at 21:15

## 2021-09-17 RX ADMIN — IPRATROPIUM BROMIDE AND ALBUTEROL SULFATE 1 AMPULE: .5; 3 SOLUTION RESPIRATORY (INHALATION) at 20:18

## 2021-09-17 RX ADMIN — INSULIN LISPRO 1 UNITS: 100 INJECTION, SOLUTION INTRAVENOUS; SUBCUTANEOUS at 18:00

## 2021-09-17 RX ADMIN — DEXTROSE AND SODIUM CHLORIDE: 5; 450 INJECTION, SOLUTION INTRAVENOUS at 16:13

## 2021-09-17 RX ADMIN — METHYLPREDNISOLONE SODIUM SUCCINATE 40 MG: 40 INJECTION, POWDER, FOR SOLUTION INTRAMUSCULAR; INTRAVENOUS at 00:49

## 2021-09-17 RX ADMIN — SODIUM CHLORIDE, PRESERVATIVE FREE 10 ML: 5 INJECTION INTRAVENOUS at 10:44

## 2021-09-17 RX ADMIN — PANTOPRAZOLE SODIUM 40 MG: 40 INJECTION, POWDER, FOR SOLUTION INTRAVENOUS at 10:43

## 2021-09-17 ASSESSMENT — PAIN SCALES - GENERAL
PAINLEVEL_OUTOF10: 0
PAINLEVEL_OUTOF10: 0

## 2021-09-17 NOTE — PROGRESS NOTES
RESPIRATORY THERAPY ASSESSMENT    Name:  Federico Nazario  Medical Record Number:  [id number]  Age: [de-identified] y.o. Gender: female  : 1940  Today's Date:  2021  Room:  [place] [address]    Assessment     Is the patient being admitted for a COPD or Asthma exacerbation? No   (If yes the patient will be seen every 4 hours for the first 24 hours and then reassessed)    Patient Admission Diagnosis      Allergies  Allergies   Allergen Reactions    Latex      Burning      Codeine      \"hallucinations\"       Minimum Predicted Vital Capacity:               Actual Vital Capacity:                    Pulmonary History:COPD and Asthma  Home Oxygen Therapy:    UNKNOWN  Home Respiratory Therapy:Albuterol, Albuterol/Ipratropium Bromide HHN, Budesonide, Budesonide/Formoterol  and Tiotropium Bromide   Current Respiratory Therapy:  Duoneb Q4W/A,  Albuterol Q6PRN. Treatment Type: HHN  Medications: Albuterol/Ipratropium    Respiratory Severity Index(RSI)   Patients with orders for inhalation medications, oxygen, or any therapeutic treatment modality will be placed on Respiratory Protocol. They will be assessed with the first treatment and at least every 72 hours thereafter. The following severity scale will be used to determine frequency of treatment intervention.     Smoking History: Pulmonary Disease or Smoking History, Greater than 15 pack year = 2    Social History  Social History     Tobacco Use    Smoking status: Former Smoker     Packs/day: 0.50     Years: 50.00     Pack years: 25.00     Types: Cigarettes     Quit date: 2019     Years since quittin.9    Smokeless tobacco: Never Used    Tobacco comment: advised to quit   Vaping Use    Vaping Use: Never assessed   Substance Use Topics    Alcohol use: No    Drug use: No       Recent Surgical History: None = 0  Past Surgical History  Past Surgical History:   Procedure Laterality Date    BRONCHOSCOPY  2019    Dr. Quiroga Patient - w/BAL    CARDIAC CATHETERIZATION  09/05/2019    Dr. Beckie Pressley N/A 2/24/2020    COLONOSCOPY DIAGNOSTIC performed by Brook Quinn DO at [address] N/A 9/19/2019    OFF PUMP CORONARY ARTERY BYPASS GRAFTING X2, INTERNAL MAMMARY ARTERY, SAPHENOUS VEIN GRAFT performed by James Cantrell MD at [address] [facility] , PARTIAL Left     SIGMOIDOSCOPY  02/27/2020    4 bands    SIGMOIDOSCOPY N/A 2/27/2020    FLEX SIG W/ BANDING SIGMOIDOSCOPY DIAGNOSTIC FLEXIBLE performed by Brook Quinn DO at [address] [place] [address]       Level of Consciousness: Alert, Follows Commands but Disoriented = 1    Level of Activity: Mostly sedentary, minimal walking = 2    Respiratory Pattern: Dyspnea with exertion;Irregular pattern;or RR less than 6 = 2    Breath Sounds: Diminshed bilaterally and/or crackles = 2    Sputum   ,  , Sputum How Obtained: Spontaneous cough  Cough: Strong, spontaneous, non-productive = 0    Vital Signs   /76   Pulse 130   Temp 97.9 °F (36.6 °C) (Axillary)   Resp (!) 31   Ht 5' 2\" (1.575 m)   Wt 103 lb (46.7 kg)   SpO2 97%   BMI 18.84 kg/m²   SPO2 (COPD values may differ): 86-87% on room air or greater than 92% on FiO2 35- 50% = 3    Peak Flow (asthma only): not applicable = 0    RSI: 52-92 = Q4WA (every four hours while awake) and Q4hrs PRN        Plan       Goals: medication delivery    Patient/caregiver was educated on the proper method of use for Respiratory Care Devices:       Level of patient/caregiver understanding able to:   ? Verbalize understanding   ? Demonstrate understanding       ? Teach back        ? Needs reinforcement       ? No available caregiver               ? Other:     Response to education:       Is patient being placed on Home Treatment Regimen? No     Does the patient have everything they need prior to discharge? NA     Comments: Chart reviewed and patient assessed.      Plan of Care: Duoneb Q4W/A,  Albuterol Q6PRN. Electronically signed by Roberto Garces RCP on 9/16/2021 at 9:18 PM    Respiratory Protocol Guidelines     1. Assessment and treatment by Respiratory Therapy will be initiated for medication and therapeutic interventions upon initiation of aerosolized medication. 2. Physician will be contacted for respiratory rate (RR) greater than 35 breaths per minute. Therapy will be held for heart rate (HR) greater than 140 beats per minute, pending direction from physician. 3. Bronchodilators will be administered via Metered Dose Inhaler (MDI) with spacer when the following criteria are met:  a. Alert and cooperative     b. HR < 140 bpm  c. RR < 30 bpm                d. Can demonstrate a 2-3 second inspiratory hold  4. Bronchodilators will be administered via Hand Held Nebulizer [facility]) to patients when ANY of the following criteria are met  a. Incognizant or uncooperative          b. Patients treated with HHN at Home        c. Unable to demonstrate proper use of MDI with spacer     d. RR > 30 bpm   5. Bronchodilators will be delivered via Metered Dose Inhaler (MDI), HHN, Aer[employer] to intubated patients on mechanical ventilation. 6. Inhalation medication orders will be delivered and/or substituted as outlined below. Aerosolized Medications Ordering and Administration Guidelines:    1. All Medications will be ordered by a physician, and their frequency and/or modality will be adjusted as defined by the patients Respiratory Severity Index (RSI) score. 2. If the patient does not have documented COPD, consider discontinuing anticholinergics when RSI is less than 9.  3. If the bronchospasm worsens (increased RSI), then the bronchodilator frequency can be increased to a maximum of every 4 hours. If greater than every 4 hours is required, the physician will be contacted.   4. If the bronchospasm improves, the frequency of the bronchodilator can be decreased, based on the patient's RSI, but not less than home treatment regimen frequency. 5. Bronchodilator(s) will be discontinued if patient has a RSI less than 9 and has received no scheduled or as needed treatment for 72  Hrs. Patients Ordered on a Mucolytic Agent:    1. Must always be administered with a bronchodilator. 2. Discontinue if patient experiences worsened bronchospasm, or secretions have lessened to the point that the patient is able to clear them with a cough. Anti-inflammatory and Combination Medications:    1. If the patient lacks prior history of lung disease, is not using inhaled anti-inflammatory medication at home, and lacks wheezing by examination or by history for at least 24 hours, contact physician for possible discontinuation.

## 2021-09-17 NOTE — PROGRESS NOTES
09/17/21 0241   NIV Type   Equipment Type V60   Mode Bilevel   Mask Type Full face mask   Mask Size Medium   Settings/Measurements   IPAP 20 cmH20   CPAP/EPAP 5 cmH2O   Rate Ordered 22   Resp 22   FiO2  35 %   Vt Exhaled 479 mL   Mask Leak (lpm) 12 lpm   Comfort Level Good   Using Accessory Muscles No   SpO2 100   Alarm Settings   Alarms On Y

## 2021-09-17 NOTE — PROGRESS NOTES
Pt's CVC bleeding, dressing changed under aseptic technique, bath provided, gown and linens changed.

## 2021-09-17 NOTE — PROGRESS NOTES
Pt repositioned during wound care. During turning, pt became very anxious and heart rate started to go up. HR up to 150s at highest.  Pt put back in supine position. Very anxious and breathing hard. Sp02 remained >90S during all of this.

## 2021-09-17 NOTE — PROGRESS NOTES
Vascular access consult for PICC received at 3:14 pm. STAT pt/inr ordered at 3:17 pm. As of 5:05 pm pt/inr has not been collected by lab. Per policy pt/inr required within 3 days for PICC line placement. Contacted lab who stated they would collect as soon as possible. Informed lab waiting on the pt/inr for line placement.  stated they understood the need and would get to it as soon as possible. Notified Yakelin Aguila RN of PT/INR requirement for PICC placement and to contact DIT as pt/inr was not resulted by 5 pm nor collected at 5:10 pm for in house central line placement.

## 2021-09-17 NOTE — PROGRESS NOTES
Admit: 9/15/2021    Name:  Em Grullon  Room:  SD14/SD-14  MRN:    [id number]    Critical Care Daily Progress Note for 2021   Presented from nursing home with acute respiratory distress and altered mental status. Placed on BiPAP  She had just been discharged to the nursing [facility] after being admitted for HCAP and acute respiratory failure. Interval History:     Was on BiPAP all night. Currently on 3 L of oxygen. Alert and oriented.     Scheduled Meds:   miconazole nitrate   Topical BID    atorvastatin  20 mg Oral Nightly    clopidogrel  75 mg Oral Daily    docusate sodium  100 mg Oral BID    ferrous sulfate  325 mg Oral Daily with breakfast    metoprolol tartrate  25 mg Oral BID    pantoprazole  40 mg IntraVENous Daily    Roflumilast  250 mcg Oral Daily    sertraline  50 mg Oral Daily    cefepime  2,000 mg IntraVENous Q12H    sodium chloride flush  5-40 mL IntraVENous 2 times per day    enoxaparin  40 mg SubCUTAneous Daily    vancomycin (VANCOCIN) intermittent dosing (placeholder)   Other RX Placeholder    methylPREDNISolone sodium  40 mg IntraVENous Q12H    guaiFENesin  600 mg Oral BID    insulin lispro  0-6 Units SubCUTAneous TID WC    insulin lispro  0-3 Units SubCUTAneous Nightly    lidocaine 1 % injection  5 mL IntraDERmal Once    sodium chloride flush  5-40 mL IntraVENous 2 times per day    ipratropium-albuterol  1 ampule Inhalation Q4H While awake       Continuous Infusions:   sodium chloride      dextrose      sodium chloride      norepinephrine Stopped (21 0624)    dextrose 5 % and 0.45 % NaCl 100 mL/hr at 21 0420       PRN Meds:  bisacodyl, sodium chloride flush, sodium chloride, acetaminophen **OR** acetaminophen, albuterol, glucose, dextrose, glucagon (rDNA), dextrose, sodium chloride flush, sodium chloride, ondansetron                  Objective:     Temp  Av °F (36.7 °C)  Min: 97.6 °F (36.4 °C)  Max: 98.9 °F (37.2 °C)  Pulse LIVER PROFILE:   Recent Labs     09/15/21  1746 09/16/21  0750   AST 19 15   ALT 10 9*   BILITOT 0.4 0.3   ALKPHOS 103 79         XR CHEST PORTABLE   Final Result   No significant early change in diffuse bilateral opacity which can reflect   pulmonary edema or atypical infection. XR CHEST PORTABLE   Final Result   Status post right IJ catheter placement in good position with no pneumothorax. Slowly resolving pulmonary congestion. Chronic obstructive lung changes with slowly resolving perihilar and   bibasilar opacities. CT HEAD WO CONTRAST   Final Result   No acute intracranial abnormality. Chronic and age related changes including age-appropriate cerebral volume   loss and scattered nonspecific white matter hypodensities which are likely   the sequela of chronic small vessel ischemic disease. Partial opacification of the bilateral middle ear cavities which could   indicate otitis media. Small air-fluid levels are noted in the bilateral maxillary and sphenoid   sinuses, correlate clinically for evidence of acute sinusitis. XR CHEST PORTABLE   Final Result   Findings compatible with pulmonary interstitial edema. Given the normal   heart size, consider both cardiogenic and noncardiogenic etiologies,   including atypical infections.          XR CHEST PORTABLE    (Results Pending)   VL Extremity Venous Right    (Results Pending)     EKG-  Sinus rhythm with Premature supraventricular complexesWhen     Assessment & Plan:     Patient Active Problem List    Diagnosis Date Noted    Acute respiratory failure with hypoxia and hypercapnia ([address] 09/16/2021    Shock ([address]     Pneumonia due to infectious organism     Acute respiratory distress 09/09/2021    Volume overload 09/09/2021    Demand ischemia ([address] 09/09/2021    GERD (gastroesophageal reflux disease) 09/09/2021    Severe malnutrition ([address] 08/31/2021    Acute respiratory failure (HCC) 08/30/2021    Chronic [facility] failure with [facility]     Essential hypertension     Anemia     Chest pain 09/18/2020    GI bleed 02/23/2020    Moderate malnutrition ([address] 09/25/2019    S/P CABG (coronary artery bypass graft) 09/25/2019    Pneumonia of both lower lobes due to methicillin resistant Staphylococcus aureus (MRSA) ([address] 09/25/2019    CAD in native artery 09/24/2019    Mucus plugging of bronchi     Status post aorto-coronary artery bypass graft     NADJA (acute kidney injury) ([address]     Hypernatremia 09/14/2019    Ischemic cardiomyopathy     Acute combined systolic and diastolic congestive heart failure (HCC)     Acute respiratory failure with hypoxia ([facility] 09/08/2019    CAD (coronary artery disease) 09/08/2019    Acute pulmonary edema ([address] 09/08/2019    Pulmonary infiltrate 09/08/2019    Elevated brain natriuretic peptide (BNP) level 09/08/2019    Leukocytosis 09/08/2019    NSTEMI (non-ST elevated myocardial infarction) ([facility] 09/03/2019    Abnormal chest x-ray     COPD with acute exacerbation (HCC)     Shortness of breath     Tobacco abuse     Sepsis ([facility] 03/18/2019    COPD exacerbation ([facility] 12/29/2017    OA (osteoarthritis) 08/12/2014    Tobacco use 08/12/2014    Hyperlipidemia 05/07/2013    Asthma 05/07/2013       Acute on chronic hypoxic and hypercarbic respiratory failure  With end-stage COPD with acute exacerbation  Was on BiPAP yesterday and all night. Currently on 3 L of oxygen. Use BiPAP at nighttime and as needed during the day  Continue IV Solu-Medrol  Continue IV cefepime day 2 and vancomycin day 2  Mucinex  Daliresp  Pulmonary consult     Healthcare associate pneumonia. Elevated procalcitonin  Continue IV cefepime and vancomycin for presumable gram-negative and/are MRSA pneumonia. Sputum cultures.     Metabolic encephalopathy. Resolving. Possible UTI. Cultures pending.   Continue cefepime and bank.     Chronic anemia  Get iron studies      CAD/HTN- continued home BB, Clopidogrel, atorvastatin  Mildly elevated troponin likely type II demand based ischemia     Mood disorder continued home sertraline     GERD- continued home PPI    Right upper extremity swelling.   Obtain venous Doppler       DVT Prophylaxis:lov  Diet: Diet NPO  Code Status: Full Code     PT/OT Eval Status:p     Dispo - ICU       Yesenia Zepeda MD

## 2021-09-17 NOTE — CARE COORDINATION
INTERDISCIPLINARY PLAN OF CARE CONFERENCE    Date/Time: 9/17/2021 10:28 AM  Completed by:  LEE Domingo. Case Management      Patient Name:  Emily Willis  YOB: 1940  Admitting Diagnosis: Acute respiratory failure with hypoxia ([address] [J96.01]  Acute respiratory failure with hypoxia and hypercapnia ([address] [J96.01, J96.02]  Pneumonia due to infectious organism, unspecified laterality, unspecified part of lung [J18.9]     Admit Date/Time:  9/15/2021  5:28 PM    Chart reviewed. Interdisciplinary team contacted or reviewed plan related to patient progress and discharge plans. Disciplines included Case Management, Nursing, and Dietitian. Current Status:Ongoing. ICU overflow. Bipap  PT/OT recommendation for discharge plan of care: TBD    Expected D/C Disposition:  TBD  Confirmed plan with patient and/or family Yes confirmed with: (name) pt's daughter Frandy Mckenna  Met with:pt's daughter Frandy Mckenna    Discharge Plan Comments: Chart review completed. Attempted meeting with pt at bedside but she was wearing a bipap and no family at bedside. Called and spoke with pt's daughter Frandy Mckenna. She stated their plan is still to take pt home with [place] [id number] rather than SNF. She is aware CM will follow for therapy recommendations when appropriate and she stated agreement. She requested referral to [facility]) as pt has used them in the past and liked them. Offered to transfer her to pt's RN for an update but she declined stating she will speak with her niece as she got an update. She declined needs or questions for CM.     Referral to Julia Jason with [facility] for the referral.     Home O2 in place on admit: Yes

## 2021-09-17 NOTE — PROGRESS NOTES
Pt a/o. VSS. Assessment complete as charted. Repositioned for comfort. Call light in reach. Denies needs. Will continue to monitor. Pt on BiPAP FiO2 45%.

## 2021-09-17 NOTE — PROGRESS NOTES
09/16/21 2321   NIV Type   Equipment Type V60   Mode Bilevel   Mask Type Full face mask   Mask Size Medium   Settings/Measurements   IPAP 20 cmH20   CPAP/EPAP 5 cmH2O   Rate Ordered 22   Resp 23   FiO2  45 %   Vt Exhaled 498 mL   Mask Leak (lpm) 7 lpm   Comfort Level Good   Using Accessory Muscles No   SpO2 99   Breath Sounds   Right Upper Lobe Rhonchi   Right Middle Lobe Rhonchi   Right Lower Lobe Rhonchi   Left Upper Lobe Rhonchi   Left Lower Lobe Rhonchi   Patient Observation   Observations SPO2  99%  on 45%  FIO2  BIPAP.     Alarm Settings   Alarms On Y

## 2021-09-17 NOTE — PROGRESS NOTES
Pulmonary & Critical Care Medicine ICU Progress Note      Events of Last 24 hours:   IJ placement 2/2 hypotension & loss of access    Invasive Lines:   IV Line: Right IJ CVC    Recent Labs     09/16/21  1015 09/16/21  1230   PHART 7.286* 7.339*   [id number] 69.5* 60.7*   PO2ART 162.6* 163.6*       IV:   sodium chloride      dextrose      sodium chloride      norepinephrine Stopped (09/17/21 0624)    dextrose 5 % and 0.45 % NaCl 100 mL/hr at 09/17/21 0420       EXAM:  /85   Pulse 81   Temp 98.9 °F (37.2 °C) (Axillary)   Resp 25   Ht 5' 2\" (1.575 m)   Wt 106 lb 8 oz (48.3 kg)   SpO2 100%   BMI 19.48 kg/m²  on BiPAP  Tmax:  CVP: CVP (Mean): 5 mmHg    Intake/Output Summary (Last 24 hours) at 9/17/2021 0924  Last data filed at 9/17/2021 0415  Gross per 24 hour   Intake 3357.39 ml   Output 385 ml   Net 2972.39 ml     General: No acute distress. Alert. Eyes: PERRL. No sclera icterus. No conjunctival injection. ENT: No discharge. Pharynx clear. Neck: Trachea midline. Normal thyroid. Right IJ in place. Resp: No accessory muscle use. No crackles. + wheezing. + rhonchi. No dullness on percussion. CV: Regular rate. Regular rhythm. No mumur or rub. + edema. Peripheral pulses are 2+. Capillary refill is less than 3 seconds. GI: Non-tender. Non-distended. Normal bowel sounds. No hernia. Skin: Warm and dry. No nodule on exposed extremities. No rash on exposed extremities. Lymph: No cervical LAD. No supraclavicular LAD. M/S: No cyanosis. No joint deformity. No clubbing. Neuro: Awake. Follows commands. Positive pupils/gag/corneals. Normal pain response. Psych: Oriented to person, place, time. No anxiety or agitation.      Medications:   atorvastatin  20 mg Oral Nightly    clopidogrel  75 mg Oral Daily    docusate sodium  100 mg Oral BID    ferrous sulfate  325 mg Oral Daily with breakfast    metoprolol tartrate  25 mg Oral BID    pantoprazole  40 mg IntraVENous Daily    Roflumilast  250 mcg Oral Daily    sertraline  50 mg Oral Daily    cefepime  2,000 mg IntraVENous Q12H    sodium chloride flush  5-40 mL IntraVENous 2 times per day    enoxaparin  40 mg SubCUTAneous Daily    vancomycin (VANCOCIN) intermittent dosing (placeholder)   Other RX Placeholder    methylPREDNISolone sodium  40 mg IntraVENous Q12H    guaiFENesin  600 mg Oral BID    insulin lispro  0-6 Units SubCUTAneous TID WC    insulin lispro  0-3 Units SubCUTAneous Nightly    lidocaine 1 % injection  5 mL IntraDERmal Once    sodium chloride flush  5-40 mL IntraVENous 2 times per day    ipratropium-albuterol  1 ampule Inhalation Q4H While awake     PRN Meds:  bisacodyl, sodium chloride flush, sodium chloride, acetaminophen **OR** acetaminophen, albuterol, glucose, dextrose, glucagon (rDNA), dextrose, sodium chloride flush, sodium chloride, ondansetron    Results:  CBC:   Recent Labs     09/15/21  1746 09/16/21  0938 09/17/21  0505   WBC 11.3* 8.6 8.1   HGB 9.7* 8.4* 7.0*   HCT 31.9* 27.5* 22.6*   MCV 94.8 94.4 92.3   * 327 291     BMP:   Recent Labs     09/15/21  1746 09/16/21  0750 09/17/21  0505    143 140   K 5.3* 5.3* 3.9    105 106   CO2 31 25 26   BUN 17 19 18   CREATININE 1.1 1.0 1.0     LIVER PROFILE:   Recent Labs     09/15/21  1746 09/16/21  0750   AST 19 15   ALT 10 9*   BILITOT 0.4 0.3   ALKPHOS 103 79     PT/INR:   Recent Labs     09/16/21  0938   PROTIME 11.2   INR 0.99     APTT: No results for input(s): APTT in the last 72 hours.   UA:  Recent Labs     09/17/21  0100   COLORU Yellow   PHUR 5.5   WBCUA 10-20*   RBCUA 3-4   YEAST Present*   BACTERIA 1+*   CLARITYU Clear   SPECGRAV 1.015   LEUKOCYTESUR SMALL*   UROBILINOGEN 0.2   BILIRUBINUR Negative   BLOODU MODERATE*   GLUCOSEU Negative     Cultures:  9/16/2021 SARS-CoV-2  negative  9/16/2021 influenza A & B negative  9/15/2021 blood cultures NGTD 9/17/2021  Sputum culture pending    Imaging:    Head CT 9/15/2021  No acute intracranial abnormality  Partial opacification of the bilateral middle ear cavities which could  indicate otitis media. Small air-fluid levels are noted in the bilateral maxillary and sphenoid  sinuses, correlate clinically for evidence of acute sinusitis. CXR 9/17/2021  No significant early change in diffuse bilateral opacity which can reflect   pulmonary edema or atypical infection. ASSESSMENT:  · Acute on chronic respiratory failure with hypoxemia and refractory hypercapnia, life-threatening respiratory failure. · Was just discharged on home BiPAP/AVAPS after seeing Doniary Fearing  · Acute COPD exacerbation, resolving perihilar and bibasilar opacities  · Healthcare acquired pneumonia, resolving pulmonary congestion  · Hypotensive shock/septic shock, initially fluid responsive  · Mental status change  · UTI laboratory finding - new  · Elevated troponin - persistently elevated, 0.05 to 0.07  · Hyperkalemia, resolved  · Leukocytosis, resolved  · Chronic normocytic anemia with iron deficiency, worsening  · CAD  · S/P COVID-19 vaccination    PLAN:  · Non-invasive positive pressure ventilation for life threatening respiratory failure can be can be changed to HS and PRN work of breathing. She has had significant improvement in acidosis. · Cefepime D#3 and Vancomycin D#2. Monitoring of vancomycin levels to prevent toxicity.    · Levophed if needed for map of 65  · IV Solu-Medrol bid  · Right intrajugular central venous catheter inserted emergently 9/16/2021 for access  · Prophylaxis: Lovenox 40 qd, Bactroban, Protonix  · Full code: POA is Etienne

## 2021-09-17 NOTE — CONSULTS
[place] [facility]ound Ostomy Continence Nurse  Consult Note       NAME:  Iris Grant [facility] LEELA SUGAR [facility]  MEDICAL RECORD NUMBER:  [id number]  AGE: [de-identified] y.o. GENDER: female  : 1940  TODAY'S DATE:  2021    Subjective Pt lying in bed, anxious, labored breathing with turning   Reason for WOCN Evaluation and Assessment: olivier area      Verdis Backers is a [de-identified] y.o. female referred by:   [] Physician  [x] Nursing  [] Other:     Wound Identification:  Wound Type: MASD  Contributing Factors: moisture    Pt seen for olivier area assessment. Granddaughter at bedside. Pt has been in and out of SNF and hospitals recently. Ad[facility] [place] [facility]. She has a bond catheter at this time. Her skin is thin and fragile with scattered ecchymosis on her arms and legs.       Patient Goal of Care:  [x] Wound Healing  [] Odor Control  [] Palliative Care  [] Pain Control   [] Other:         PAST MEDICAL HISTORY        Diagnosis Date    [facility]) [facility]     Asthma     COPD [facility]ch[facility] obstructive [facility] disease) [facility]     GERD (gastroesophageal reflux disease) 2021    Hyperlipidemia     Hypertension     MRSA (methicillin resistant staph aureus) culture positive 2019    + resp cx    OA (osteoarthritis) 2014       PAST SURGICAL HISTORY    Past Surgical History:   Procedure Laterality Date    BRONCHOSCOPY  2019    Dr. Rosi Oseguera - w/BAL   [address]  2019    Dr. Michael Perry 2020    COLONOSCOPY DIAGNOSTIC performed by Josesito Medina DO at [address] N/A 2019    OFF PUMP CORONARY ARTERY BYPASS GRAFTING X2, INTERNAL MAMMARY ARTERY, SAPHENOUS VEIN GRAFT performed by Toribio Motta MD at [address] [facility] , PARTIAL Left     SIGMOIDOSCOPY  2020    4 bands    SIGMOIDOSCOPY N/A 2020    FLEX SIG W/ BANDING SIGMOIDOSCOPY DIAGNOSTIC FLEXIBLE performed by Felecia Gitelman Snow Huitron DO at [facility] HISTORY    Family History   Problem Relation Age of Onset    Cancer Mother     Heart Disease Father     Stroke Father     Heart Disease Sister     Stroke Sister        SOCIAL HISTORY    Social History     Tobacco Use    Smoking status: Former Smoker     Packs/day: 0.50     Years: 50.00     Pack years: 25.00     Types: Cigarettes     Quit date: 2019     Years since quittin.9    Smokeless tobacco: Never Used    Tobacco comment: advised to quit   Vaping Use    Vaping Use: Never assessed   Substance Use Topics    Alcohol use: No    Drug use: No       ALLERGIES    Allergies   Allergen Reactions    Latex      Burning      Codeine      \"hallucinations\"       MEDICATIONS    No current facility-administered medications on file prior to encounter. Current Outpatient Medications on File Prior to Encounter   Medication Sig Dispense Refill    guaiFENesin (MUCINEX) 600 MG extended release tablet Take 1 tablet by mouth 2 times daily for 3 days 6 tablet 0    menthol-zinc oxide (CALMOSEPTINE) 0.44-20.6 % OINT ointment Apply topically 2 times daily as needed (Apply to buttocks until resolved) Max 30 ml per day.  1 g 0    bisacodyl (DULCOLAX) 10 MG suppository Place 1 suppository rectally daily as needed for Constipation      polyethylene glycol (GLYCOLAX) 17 g packet Take 17 g by mouth 2 times daily 527 g 0    melatonin 5 MG TBDP disintegrating tablet Take 1 tablet by mouth nightly as needed (sleep)      fluticasone (FLONASE) 50 MCG/ACT nasal spray 1 spray by Each Nostril route daily as needed for Rhinitis 16 g 0    busPIRone (BUSPAR) 10 MG tablet Take 5 mg by mouth 3 times daily as needed Take 1 to 2 tablets 3 times daily PRN      pantoprazole (PROTONIX) 20 MG tablet Take 20 mg by mouth daily      furosemide (LASIX) 20 MG tablet Take 20 mg by mouth daily       ferrous sulfate (IRON 325) 325 (65 Fe) MG tablet Take 325 mg by mouth daily (with breakfast)      potassium chloride (KLOR-CON) 20 MEQ packet Take 20 mEq by mouth daily      Roflumilast (DALIRESP) 250 MCG tablet Take 250 mcg by mouth daily      acetaminophen (TYLENOL) 500 MG tablet Take 500 mg by mouth every 6 hours as needed for Pain      sertraline (ZOLOFT) 50 MG tablet Take 50 mg by mouth daily      budesonide (PULMICORT) 0.5 MG/2ML nebulizer suspension Take 2 mLs by nebulization 2 times daily 60 ampule 3    ipratropium-albuterol (DUONEB) 0.5-2.5 (3) MG/3ML SOLN nebulizer solution Inhale 3 mLs into the lungs every 6 hours 360 mL 0    atorvastatin (LIPITOR) 20 MG tablet Take 1 tablet by mouth nightly 30 tablet 3    metoprolol tartrate (LOPRESSOR) 25 MG tablet Take 1 tablet by mouth 2 times daily 60 tablet 3    docusate sodium (COLACE, DULCOLAX) 100 MG CAPS Take 100 mg by mouth 2 times daily 30 capsule 0    clopidogrel (PLAVIX) 75 MG tablet Take 1 tablet by mouth daily 30 tablet 3    tiotropium (SPIRIVA HANDIHALER) 18 MCG inhalation capsule Inhale 18 mcg into the lungs daily      budesonide-formoterol (SYMBICORT) 160-4.5 MCG/ACT AERO Inhale 2 puffs into the lungs 2 times daily      albuterol (PROVENTIL HFA) 108 (90 BASE) MCG/ACT inhaler Inhale 2 puffs into the lungs every 6 hours as needed for Wheezing or Shortness of Breath.  1 Inhaler 2       Objective    /72   Pulse 115   Temp 98.9 °F (37.2 °C) (Axillary)   Resp 28   Ht 5' 2\" (1.575 m)   Wt 106 lb 8 oz (48.3 kg)   SpO2 100%   BMI 19.48 kg/m²     LABS:  WBC:    Lab Results   Component Value Date    WBC 8.1 09/17/2021     H/H:    Lab Results   Component Value Date    HGB 7.0 09/17/2021    HCT 22.6 09/17/2021     PTT:    Lab Results   Component Value Date    APTT 59.7 08/31/2021   [APTT}  PT/INR:    Lab Results   Component Value Date    PROTIME 11.2 09/16/2021    INR 0.99 09/16/2021     HgBA1c:    Lab Results   Component Value Date    LABA1C 5.0 09/15/2021       Assessment   Trey Risk Score: Trey Scale Score: 12    Patient Active Problem List   Diagnosis Code    Hyperlipidemia E78.5    Asthma J45.909    OA (osteoarthritis) M19.90    Tobacco use Z72.0    COPD exacerbation ([facility] J44.1    Sepsis [facility] A41.9    Abnormal chest x-ray R93.89    COPD with acute exacerbation ([facility] J44.1    Shortness of breath R06.02    Tobacco abuse Z72.0    Moderate malnutrition ([facility]) E44.0    NSTEMI (non-ST elevated myocardial infarction) ([facility] I21.4    Acute respiratory failure with hypoxia ([facility]) J96.01    CAD (coronary artery disease) I25.10    Acute pulmonary edema ([facility]) J81.0    Pulmonary infiltrate R91.8    Elevated brain natriuretic peptide (BNP) level R79.89    Leukocytosis D72.829    Ischemic cardiomyopathy I25.5    Acute combined systolic and diastolic congestive heart failure ([facility]) I50.41    Hypernatremia E87.0    NADJA (acute kidney injury) ([facility] N17.9    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    Mucus plugging of bronchi T17.500A    CAD in native artery I25.10    S/P CABG (coronary artery bypass graft) Z95.1    Pneumonia of both lower lobes due to methicillin resistant Staphylococcus aureus (MRSA) ([facility]) J15.212    GI bleed K92.2    Severe malnutrition ([facility]) E43    Chest pain R07.9    Chronic respiratory failure with hypoxia ([facility]) J96.11    Essential hypertension I10    Anemia D64.9    Acute respiratory failure ([facility]) J96.00    Acute respiratory distress R06.03    Volume overload E87.70    Demand ischemia ([facility]) I24.8    GERD (gastroesophageal reflux disease) K21.9    Acute respiratory failure with hypoxia and hypercapnia ([facility]) J96.01, J96.02    Shock ([facility]) R57.9    Pneumonia due to infectious organism J18.9       MASD with a pressure component to the sacrum, 2 areas of stage 2 pressure injury noted. Macular red rash with red satellite lesions to buttocks, gluteal folds and periarea. Plan  Remove Mepilex sacral border.   Apply Aloe Vesta antifungal ointment to sacrum, buttocks and periarea  Gently apply Hydraguard Silicone Cream to pt's arms and legs daily to prevent skin tears. Specialty Bed Required : No   [] Low Air Loss   [] Pressure Redistribution  [] Fluid Immersion  [] Bariatric  [] Total Pressure Relief  [] Other:     Current Diet: ADULT DIET;  Dysphagia - Minced and Moist; 5 carb choices (75 gm/meal)  Dietician consult:  Yes    Discharge Plan:  Placement for patient upon discharge: skilled nursing    Patient appropriate for Outpatient [address]: No    Referrals:  [x]   [] [facility]  [] Supplies  [] Other    Patient/Caregiver Teaching:  Level of patient/caregiver understanding able to:   [] Indicates understanding       [x] Needs reinforcement  [] Unsuccessful      [] Verbal Understanding  [] Demonstrated understanding       [] No evidence of learning  [] Refused teaching         [] N/A       Electronically signed by Royer Townsend RN, CWOCN on 9/17/2021 at 1:03 PM

## 2021-09-17 NOTE — PROGRESS NOTES
Pharmacy Vancomycin Consult     Vancomycin Day: 2  Current Dosing: Pulse dosing   Current indication: HAP    Temp max:  97.9    Recent Labs     09/15/21  1746 09/16/21  0750 09/16/21  0938 09/17/21  0505   BUN  --  19  --  18   CREATININE  --  1.0  --  1.0   WBC   < >  --  8.6 8.1    < > = values in this interval not displayed. Intake/Output Summary (Last 24 hours) at 9/17/2021 0559  Last data filed at 9/17/2021 0415  Gross per 24 hour   Intake 3357.39 ml   Output 385 ml   Net 2972.39 ml     Culture Date      Source                       Results      Ht Readings from Last 1 Encounters:   09/16/21 5' 2\" (1.575 m)        Wt Readings from Last 1 Encounters:   09/17/21 106 lb 8 oz (48.3 kg)       Body mass index is 19.48 kg/m². Estimated Creatinine Clearance: 34 mL/min (based on SCr of 1 mg/dL). Random: 15 mcg/ml    Assessment/Plan:  Will give vancomycin 750 mg IVPB x 1 dose today. Recheck random vanc level with AM labs daily and re-dose as appropriate.

## 2021-09-17 NOTE — PROGRESS NOTES
Pt's BP 70/48 map 57, Levophed started. Paged MD regarding hypotension and CVP 2. MD ordered 1 Lt Bolus fluid.

## 2021-09-17 NOTE — PROGRESS NOTES
Rounds completed with Dr. Gael Yang at this time. Discussed BIPap needs. New order for ABG. If this is ok, place pt on NC and use BiPap HS.

## 2021-09-17 NOTE — CARE COORDINATION
[facility]  Received referral regarding [facility] [employer], [facility]. services from [address] [place] [address]. Liaison to follow up with [facility] for soc coverage closer to discharge.     Sent request to [facility] for [facility] coverage with possible discharge home [facility] 9/20    Electronically signed by Trace Manriquez RN on 9/17/2021 at 10:37 AM

## 2021-09-17 NOTE — PROGRESS NOTES
AM assessment completed, see flow sheet. Pt is alert and oriented. Vital signs are WNL. Respirations are even & easy. Pt continues on BiPap. No complaints voiced. Pt denies needs at this time. SR up x 2, and bed in low position. Call light is within reach.

## 2021-09-17 NOTE — PROGRESS NOTES
Pt with c/o right chest pain. MD made aware.   Orders to give AM metoprolol and obtain EKG if pain persist.

## 2021-09-18 ENCOUNTER — APPOINTMENT (OUTPATIENT)
Dept: GENERAL RADIOLOGY | Age: 81
DRG: 177 | End: 2021-09-18
Payer: MEDICARE

## 2021-09-18 LAB
ABO/RH: NORMAL
ABO/RH: NORMAL
ANION GAP SERPL CALCULATED.3IONS-SCNC: 8 MMOL/L (ref 3–16)
ANTIBODY SCREEN: NORMAL
BLOOD BANK DISPENSE STATUS: NORMAL
BLOOD BANK DISPENSE STATUS: NORMAL
BLOOD BANK PRODUCT CODE: NORMAL
BLOOD BANK PRODUCT CODE: NORMAL
BPU ID: NORMAL
BPU ID: NORMAL
BUN BLDV-MCNC: 13 MG/DL (ref 7–20)
CALCIUM SERPL-MCNC: 8.3 MG/DL (ref 8.3–10.6)
CHLORIDE BLD-SCNC: 106 MMOL/L (ref 99–110)
CO2: 24 MMOL/L (ref 21–32)
CREAT SERPL-MCNC: 0.9 MG/DL (ref 0.6–1.2)
DESCRIPTION BLOOD BANK: NORMAL
DESCRIPTION BLOOD BANK: NORMAL
GFR AFRICAN AMERICAN: >60
GFR NON-AFRICAN AMERICAN: >60
GLUCOSE BLD-MCNC: 102 MG/DL (ref 70–99)
GLUCOSE BLD-MCNC: 129 MG/DL (ref 70–99)
GLUCOSE BLD-MCNC: 146 MG/DL (ref 70–99)
GLUCOSE BLD-MCNC: 152 MG/DL (ref 70–99)
GLUCOSE BLD-MCNC: 167 MG/DL (ref 70–99)
HCT VFR BLD CALC: 19.9 % (ref 36–48)
HEMOGLOBIN: 6.5 G/DL (ref 12–16)
MAGNESIUM: 1.4 MG/DL (ref 1.8–2.4)
MCH RBC QN AUTO: 30.1 PG (ref 26–34)
MCHC RBC AUTO-ENTMCNC: 32.7 G/DL (ref 31–36)
MCV RBC AUTO: 92.2 FL (ref 80–100)
PDW BLD-RTO: 18.3 % (ref 12.4–15.4)
PERFORMED ON: ABNORMAL
PLATELET # BLD: 232 K/UL (ref 135–450)
PMV BLD AUTO: 8.2 FL (ref 5–10.5)
POTASSIUM SERPL-SCNC: 3.6 MMOL/L (ref 3.5–5.1)
PROCALCITONIN: 0.71 NG/ML (ref 0–0.15)
RBC # BLD: 2.16 M/UL (ref 4–5.2)
SODIUM BLD-SCNC: 138 MMOL/L (ref 136–145)
TROPONIN: 0.05 NG/ML
URINE CULTURE, ROUTINE: NORMAL
VANCOMYCIN TROUGH: 16.4 UG/ML (ref 10–20)
WBC # BLD: 6.3 K/UL (ref 4–11)

## 2021-09-18 PROCEDURE — 6370000000 HC RX 637 (ALT 250 FOR IP): Performed by: INTERNAL MEDICINE

## 2021-09-18 PROCEDURE — 6360000002 HC RX W HCPCS: Performed by: INTERNAL MEDICINE

## 2021-09-18 PROCEDURE — 2580000003 HC RX 258: Performed by: INTERNAL MEDICINE

## 2021-09-18 PROCEDURE — C9113 INJ PANTOPRAZOLE SODIUM, VIA: HCPCS | Performed by: INTERNAL MEDICINE

## 2021-09-18 PROCEDURE — 86850 RBC ANTIBODY SCREEN: CPT

## 2021-09-18 PROCEDURE — 80202 ASSAY OF VANCOMYCIN: CPT

## 2021-09-18 PROCEDURE — 84145 PROCALCITONIN (PCT): CPT

## 2021-09-18 PROCEDURE — 86923 COMPATIBILITY TEST ELECTRIC: CPT

## 2021-09-18 PROCEDURE — 84484 ASSAY OF TROPONIN QUANT: CPT

## 2021-09-18 PROCEDURE — P9016 RBC LEUKOCYTES REDUCED: HCPCS

## 2021-09-18 PROCEDURE — 85027 COMPLETE CBC AUTOMATED: CPT

## 2021-09-18 PROCEDURE — 6360000002 HC RX W HCPCS

## 2021-09-18 PROCEDURE — 94761 N-INVAS EAR/PLS OXIMETRY MLT: CPT

## 2021-09-18 PROCEDURE — 2000000000 HC ICU R&B

## 2021-09-18 PROCEDURE — 6370000000 HC RX 637 (ALT 250 FOR IP)

## 2021-09-18 PROCEDURE — 86900 BLOOD TYPING SEROLOGIC ABO: CPT

## 2021-09-18 PROCEDURE — 86901 BLOOD TYPING SEROLOGIC RH(D): CPT

## 2021-09-18 PROCEDURE — 83735 ASSAY OF MAGNESIUM: CPT

## 2021-09-18 PROCEDURE — 2700000000 HC OXYGEN THERAPY PER DAY

## 2021-09-18 PROCEDURE — 80048 BASIC METABOLIC PNL TOTAL CA: CPT

## 2021-09-18 PROCEDURE — 94640 AIRWAY INHALATION TREATMENT: CPT

## 2021-09-18 PROCEDURE — 99233 SBSQ HOSP IP/OBS HIGH 50: CPT | Performed by: INTERNAL MEDICINE

## 2021-09-18 PROCEDURE — 94660 CPAP INITIATION&MGMT: CPT

## 2021-09-18 PROCEDURE — 6370000000 HC RX 637 (ALT 250 FOR IP): Performed by: HOSPITALIST

## 2021-09-18 PROCEDURE — 71045 X-RAY EXAM CHEST 1 VIEW: CPT

## 2021-09-18 PROCEDURE — 36415 COLL VENOUS BLD VENIPUNCTURE: CPT

## 2021-09-18 PROCEDURE — 36430 TRANSFUSION BLD/BLD COMPNT: CPT

## 2021-09-18 PROCEDURE — 6360000002 HC RX W HCPCS: Performed by: HOSPITALIST

## 2021-09-18 RX ORDER — METHYLPREDNISOLONE SODIUM SUCCINATE 40 MG/ML
40 INJECTION, POWDER, LYOPHILIZED, FOR SOLUTION INTRAMUSCULAR; INTRAVENOUS DAILY
Status: DISCONTINUED | OUTPATIENT
Start: 2021-09-19 | End: 2021-09-20

## 2021-09-18 RX ORDER — SODIUM CHLORIDE 9 MG/ML
INJECTION, SOLUTION INTRAVENOUS PRN
Status: DISCONTINUED | OUTPATIENT
Start: 2021-09-18 | End: 2021-09-23 | Stop reason: HOSPADM

## 2021-09-18 RX ORDER — POTASSIUM CHLORIDE 29.8 MG/ML
20 INJECTION INTRAVENOUS
Status: COMPLETED | OUTPATIENT
Start: 2021-09-18 | End: 2021-09-18

## 2021-09-18 RX ORDER — OXYMETAZOLINE HYDROCHLORIDE 0.05 G/100ML
2 SPRAY NASAL 2 TIMES DAILY
Status: DISCONTINUED | OUTPATIENT
Start: 2021-09-18 | End: 2021-09-21

## 2021-09-18 RX ORDER — FUROSEMIDE 10 MG/ML
40 INJECTION INTRAMUSCULAR; INTRAVENOUS ONCE
Status: COMPLETED | OUTPATIENT
Start: 2021-09-18 | End: 2021-09-18

## 2021-09-18 RX ORDER — FLUTICASONE PROPIONATE 50 MCG
2 SPRAY, SUSPENSION (ML) NASAL DAILY
Status: DISCONTINUED | OUTPATIENT
Start: 2021-09-18 | End: 2021-09-23 | Stop reason: HOSPADM

## 2021-09-18 RX ORDER — MAGNESIUM SULFATE IN WATER 40 MG/ML
2000 INJECTION, SOLUTION INTRAVENOUS ONCE
Status: COMPLETED | OUTPATIENT
Start: 2021-09-18 | End: 2021-09-18

## 2021-09-18 RX ORDER — FUROSEMIDE 10 MG/ML
40 INJECTION INTRAMUSCULAR; INTRAVENOUS ONCE
Status: DISCONTINUED | OUTPATIENT
Start: 2021-09-18 | End: 2021-09-18

## 2021-09-18 RX ADMIN — VANCOMYCIN HYDROCHLORIDE 500 MG: 500 INJECTION, POWDER, LYOPHILIZED, FOR SOLUTION INTRAVENOUS at 08:31

## 2021-09-18 RX ADMIN — CEFEPIME 2000 MG: 2 INJECTION, POWDER, FOR SOLUTION INTRAVENOUS at 21:20

## 2021-09-18 RX ADMIN — METOPROLOL TARTRATE 25 MG: 25 TABLET, FILM COATED ORAL at 21:19

## 2021-09-18 RX ADMIN — DOCUSATE SODIUM 100 MG: 100 CAPSULE, LIQUID FILLED ORAL at 08:41

## 2021-09-18 RX ADMIN — ATORVASTATIN CALCIUM 20 MG: 10 TABLET, FILM COATED ORAL at 21:19

## 2021-09-18 RX ADMIN — FLUTICASONE PROPIONATE 2 SPRAY: 50 SPRAY, METERED NASAL at 18:34

## 2021-09-18 RX ADMIN — IPRATROPIUM BROMIDE AND ALBUTEROL SULFATE 1 AMPULE: .5; 3 SOLUTION RESPIRATORY (INHALATION) at 23:38

## 2021-09-18 RX ADMIN — MICONAZOLE NITRATE: 2 OINTMENT TOPICAL at 21:45

## 2021-09-18 RX ADMIN — IPRATROPIUM BROMIDE AND ALBUTEROL SULFATE 1 AMPULE: .5; 3 SOLUTION RESPIRATORY (INHALATION) at 19:43

## 2021-09-18 RX ADMIN — Medication 2 SPRAY: at 18:34

## 2021-09-18 RX ADMIN — FERROUS SULFATE TAB 325 MG (65 MG ELEMENTAL FE) 325 MG: 325 (65 FE) TAB at 08:42

## 2021-09-18 RX ADMIN — POTASSIUM CHLORIDE 20 MEQ: 29.8 INJECTION, SOLUTION INTRAVENOUS at 08:40

## 2021-09-18 RX ADMIN — PANTOPRAZOLE SODIUM 40 MG: 40 INJECTION, POWDER, FOR SOLUTION INTRAVENOUS at 08:41

## 2021-09-18 RX ADMIN — SERTRALINE HYDROCHLORIDE 50 MG: 50 TABLET ORAL at 08:42

## 2021-09-18 RX ADMIN — FUROSEMIDE 40 MG: 10 INJECTION, SOLUTION INTRAMUSCULAR; INTRAVENOUS at 16:29

## 2021-09-18 RX ADMIN — SODIUM CHLORIDE 250 ML: 9 INJECTION, SOLUTION INTRAVENOUS at 08:37

## 2021-09-18 RX ADMIN — DOCUSATE SODIUM 100 MG: 100 CAPSULE, LIQUID FILLED ORAL at 21:19

## 2021-09-18 RX ADMIN — POTASSIUM CHLORIDE 20 MEQ: 29.8 INJECTION, SOLUTION INTRAVENOUS at 10:59

## 2021-09-18 RX ADMIN — METHYLPREDNISOLONE SODIUM SUCCINATE 40 MG: 40 INJECTION, POWDER, FOR SOLUTION INTRAMUSCULAR; INTRAVENOUS at 00:52

## 2021-09-18 RX ADMIN — ACETAMINOPHEN 650 MG: 325 TABLET ORAL at 16:10

## 2021-09-18 RX ADMIN — INSULIN LISPRO 1 UNITS: 100 INJECTION, SOLUTION INTRAVENOUS; SUBCUTANEOUS at 08:42

## 2021-09-18 RX ADMIN — ROFLUMILAST 250 MCG: 500 TABLET ORAL at 08:42

## 2021-09-18 RX ADMIN — CEFEPIME 2000 MG: 2 INJECTION, POWDER, FOR SOLUTION INTRAVENOUS at 10:33

## 2021-09-18 RX ADMIN — ONDANSETRON HYDROCHLORIDE 4 MG: 2 INJECTION, SOLUTION INTRAMUSCULAR; INTRAVENOUS at 09:11

## 2021-09-18 RX ADMIN — SODIUM CHLORIDE, PRESERVATIVE FREE 10 ML: 5 INJECTION INTRAVENOUS at 08:44

## 2021-09-18 RX ADMIN — DEXTROSE AND SODIUM CHLORIDE: 5; 450 INJECTION, SOLUTION INTRAVENOUS at 21:19

## 2021-09-18 RX ADMIN — GUAIFENESIN 600 MG: 600 TABLET, EXTENDED RELEASE ORAL at 21:19

## 2021-09-18 RX ADMIN — IPRATROPIUM BROMIDE AND ALBUTEROL SULFATE 1 AMPULE: .5; 3 SOLUTION RESPIRATORY (INHALATION) at 08:12

## 2021-09-18 RX ADMIN — GUAIFENESIN 600 MG: 600 TABLET, EXTENDED RELEASE ORAL at 08:42

## 2021-09-18 RX ADMIN — IPRATROPIUM BROMIDE AND ALBUTEROL SULFATE 1 AMPULE: .5; 3 SOLUTION RESPIRATORY (INHALATION) at 11:51

## 2021-09-18 RX ADMIN — DEXTROSE AND SODIUM CHLORIDE: 5; 450 INJECTION, SOLUTION INTRAVENOUS at 01:27

## 2021-09-18 RX ADMIN — METHYLPREDNISOLONE SODIUM SUCCINATE 40 MG: 40 INJECTION, POWDER, FOR SOLUTION INTRAMUSCULAR; INTRAVENOUS at 14:59

## 2021-09-18 RX ADMIN — METOPROLOL TARTRATE 25 MG: 25 TABLET, FILM COATED ORAL at 08:42

## 2021-09-18 RX ADMIN — MAGNESIUM SULFATE HEPTAHYDRATE 2000 MG: 40 INJECTION, SOLUTION INTRAVENOUS at 15:07

## 2021-09-18 RX ADMIN — MICONAZOLE NITRATE: 2 OINTMENT TOPICAL at 08:43

## 2021-09-18 ASSESSMENT — PAIN SCALES - WONG BAKER
WONGBAKER_NUMERICALRESPONSE: 0

## 2021-09-18 ASSESSMENT — PAIN SCALES - GENERAL: PAINLEVEL_OUTOF10: 5

## 2021-09-18 NOTE — PROGRESS NOTES
Patient resting in bed, on bipap, rhonchi noted, mouth care complete, congested cough. Patient SR on monitor with frequent PAC's and occasional PVC. Bed pad changed, back cleaned, olivier care complete, mipilex changed to coccyx, excoriation noted with open areas, redness, olivier area also reddened, antifungal ointment placed. Patient turned and repositioned. Arms weeping, pads changed to arms. Denies needs, call light in reach.

## 2021-09-18 NOTE — PROGRESS NOTES
Patient resting in bed, a/o, pleasant and cooperative, [place], visual deficits. Patient on 1.5 liters oxygen per NC, non-productive cough noted, rhonchi upper lobes, wheeze left base. Bilateral arms weeping, SR with frequent PAC's on monitor with occasional PVC. States she had BM on 9/15. Denies needs, repositioned, refuses big turns, states she can not breathe well on her side. Will continue to monitor, snack offered. Call light in reach.

## 2021-09-18 NOTE — PROGRESS NOTES
09/18/21 0236   NIV Type   Equipment Type V60   Mode Bilevel   Mask Type Full face mask   Mask Size Medium   Settings/Measurements   IPAP 20 cmH20   CPAP/EPAP 5 cmH2O   Rate Ordered 22   Resp 23   FiO2  35 %   Vt Exhaled 495 mL   Mask Leak (lpm) 22 lpm   Comfort Level Good   Using Accessory Muscles No   SpO2 99   Alarm Settings   Alarms On Y

## 2021-09-18 NOTE — PROGRESS NOTES
Admit: 9/15/2021    Name:  Neftali Hilario  Room:  SD14/SD-14  MRN:    [id number]    Critical Care Daily Progress Note for 9/18/2021       Presented from nursing home with acute respiratory distress and altered mental status. Placed on BiPAP  She had just been discharged to the nursing home from hospital after being admitted for HCAP and acute respiratory failure. Daughter reports recent hx of CVA. Also recent admission to [facility] with severe COPD and PNA. Also hx of lower GI bleed, Hx of anal fissure bleed per daughter's report. Pt with admissions in June, August.   This is her third admission this month alone. Had goals of care discussed at [facility] facility last admission - with diagnosis of End stage COPD - pt and family insist on full code. Interval History:       Awake and alert. Very [place]. Feels better. Hgb down to 6.5 this am.   No report of grossly visible blood in stool.              Scheduled Meds:   magnesium sulfate  2,000 mg IntraVENous Once    furosemide  40 mg IntraVENous Once    miconazole nitrate   Topical BID    atorvastatin  20 mg Oral Nightly    docusate sodium  100 mg Oral BID    ferrous sulfate  325 mg Oral Daily with breakfast    metoprolol tartrate  25 mg Oral BID    pantoprazole  40 mg IntraVENous Daily    Roflumilast  250 mcg Oral Daily    sertraline  50 mg Oral Daily    cefepime  2,000 mg IntraVENous Q12H    sodium chloride flush  5-40 mL IntraVENous 2 times per day    vancomycin (VANCOCIN) intermittent dosing (placeholder)   Other RX Placeholder    methylPREDNISolone sodium  40 mg IntraVENous Q12H    guaiFENesin  600 mg Oral BID    insulin lispro  0-6 Units SubCUTAneous TID WC    insulin lispro  0-3 Units SubCUTAneous Nightly    lidocaine 1 % injection  5 mL IntraDERmal Once    sodium chloride flush  5-40 mL IntraVENous 2 times per day    ipratropium-albuterol  1 ampule Inhalation Q4H While awake       Continuous Infusions:   sodium chloride      sodium chloride      sodium chloride      dextrose      sodium chloride 250 mL (21 0837)    norepinephrine Stopped (21 0624)    dextrose 5 % and 0.45 % NaCl 100 mL/hr at 21 0431       PRN Meds:  sodium chloride, sodium chloride, bisacodyl, sodium chloride flush, sodium chloride, acetaminophen **OR** acetaminophen, albuterol, glucose, dextrose, glucagon (rDNA), dextrose, sodium chloride flush, sodium chloride, ondansetron                  Objective:     Temp  Av.1 °F (36.7 °C)  Min: 97.2 °F (36.2 °C)  Max: 98.7 °F (37.1 °C)  Pulse  Av  Min: 67  Max: 130  BP  Min: 85/58  Max: 121/71  SpO2  Av %  Min: 93 %  Max: 100 %  FiO2   Av %  Min: 35 %  Max: 35 %  Patient Vitals for the past 4 hrs:   BP Temp Temp src Pulse Resp SpO2   21 1205 113/60 98.3 °F (36.8 °C) Oral 102 24 100 %   21 1151 -- -- -- -- 20 97 %   21 1100 113/60 -- -- 97 26 100 %   21 1000 108/67 -- -- 91 28 100 %   21 0900 (!) 112/92 -- -- 97 20 98 %         Intake/Output Summary (Last 24 hours) at 2021 1229  Last data filed at 2021 0438  Gross per 24 hour   Intake 2659.15 ml   Output 1600 ml   Net 1059.15 ml       Physical Exam:  General:  Awake, alert and oriented. Appears to be not in any distress  Mucous Membranes:  Pink , anicteric  Neck: No JVD, no carotid bruit, no thyromegaly  Chest: Mild increased respiratory effort. Bilateral mild wheezing. No crackles. Cardiovascular:  RRR S1S2 heard, no murmurs or gallops  Abdomen:  Soft, undistended, non tender, no organomegaly, BS present  Extremities: Significant swelling of the right upper extremity up to the elbow. Bruising both lower extremities.   Neurological : no focal deficits    Lab Data:  CBC:   Recent Labs     21  0938 21  0505 21  0550   WBC 8.6 8.1 6.3   RBC 2.91* 2.45* 2.16*   HGB 8.4* 7.0* 6.5*   HCT 27.5* 22.6* 19.9*   MCV 94.4 92.3 92.2   RDW 18.1* 18.2* 18.3*    291 232     BMP:   Recent Labs     09/16/21  0750 09/17/21  0505 09/18/21  0550    140 138   K 5.3* 3.9 3.6    106 106   CO2 25 26 24   BUN 19 18 13   CREATININE 1.0 1.0 0.9     BNP: No results for input(s): BNP in the last 72 hours. PT/INR:   Recent Labs     09/16/21  0938   PROTIME 11.2   INR 0.99     APTT:No results for input(s): APTT in the last 72 hours. CARDIAC ENZYMES:   Recent Labs     09/16/21  0500 09/16/21  0938 09/18/21  0550   TROPONINI 0.05* 0.06* 0.05*     FASTING LIPID PANEL:  Lab Results   Component Value Date    CHOL 146 11/14/2019    HDL 63 11/14/2019    TRIG 110 11/14/2019     LIVER PROFILE:   Recent Labs     09/15/21  1746 09/16/21  0750   AST 19 15   ALT 10 9*   BILITOT 0.4 0.3   ALKPHOS 103 79         XR CHEST PORTABLE   Final Result   In this patient with probable changes of COPD, there are stable diffuse   reticular opacities which may represent mild pulmonary edema. Underlying   pneumonitis is not excluded. VL Extremity Venous Right         XR CHEST PORTABLE   Final Result   No significant early change in diffuse bilateral opacity which can reflect   pulmonary edema or atypical infection. XR CHEST PORTABLE   Final Result   Status post right IJ catheter placement in good position with no pneumothorax. Slowly resolving pulmonary congestion. Chronic obstructive lung changes with slowly resolving perihilar and   bibasilar opacities. CT HEAD WO CONTRAST   Final Result   No acute intracranial abnormality. Chronic and age related changes including age-appropriate cerebral volume   loss and scattered nonspecific white matter hypodensities which are likely   the sequela of chronic small vessel ischemic disease. Partial opacification of the bilateral middle ear cavities which could   indicate otitis media.       Small air-fluid levels are noted in the bilateral maxillary and sphenoid   sinuses, correlate clinically for evidence of acute sinusitis. XR CHEST PORTABLE   Final Result   Findings compatible with pulmonary interstitial edema. Given the normal   heart size, consider both cardiogenic and noncardiogenic etiologies,   including atypical infections.          XR CHEST PORTABLE    (Results Pending)     EKG-  Sinus rhythm with Premature supraventricular complexesWhen     Assessment & Plan:     Patient Active Problem List    Diagnosis Date Noted    Acute on chronic respiratory failure with hypoxia and hypercapnia (HCC)     Acute respiratory failure with hypoxia and hypercapnia ([address] 09/16/2021    Shock ([address]     Pneumonia due to infectious organism     Acute respiratory distress 09/09/2021    Volume overload 09/09/2021    Demand ischemia ([address] 09/09/2021    GERD (gastroesophageal reflux disease) 09/09/2021    Severe malnutrition ([address] 08/31/2021    Acute respiratory failure ([address] 08/30/2021    Chronic respiratory failure with hypoxia ([address]     Essential hypertension     Anemia     Chest pain 09/18/2020    GI bleed 02/23/2020    Moderate malnutrition ([address] 09/25/2019    S/P CABG (coronary artery bypass graft) 09/25/2019    Pneumonia of both lower lobes due to methicillin resistant Staphylococcus aureus (MRSA) ([address] 09/25/2019    CAD in native artery 09/24/2019    Mucus plugging of bronchi     Status post aorto-coronary artery bypass graft     NADJA (acute kidney injury) ([address]     Hypernatremia 09/14/2019    Ischemic cardiomyopathy     Acute combined systolic and diastolic congestive heart failure (HCC)     Acute respiratory failure with hypoxia ([address] 09/08/2019    CAD (coronary artery disease) 09/08/2019    Acute pulmonary edema ([address] 09/08/2019    Pulmonary infiltrate 09/08/2019    Elevated brain natriuretic peptide (BNP) level 09/08/2019    Leukocytosis 09/08/2019    NSTEMI (non-ST elevated myocardial infarction) ([address] 09/03/2019    Abnormal chest x-ray     COPD with acute exacerbation (HCC)     Shortness of breath     Tobacco abuse     Sepsis ([facility] 03/18/2019    COPD exacerbation ([facility] 12/29/2017    OA (osteoarthritis) 08/12/2014    Tobacco use 08/12/2014    Hyperlipidemia 05/07/2013    Asthma 05/07/2013       Acute on chronic hypoxic and hypercarbic respiratory failure  With end-stage COPD with acute exacerbation  3rd admission for same this month alone. BiPAP PRN. Currently on 3 L of oxygen. Continue IV Solu-Medrol  Continue IV cefepime and vancomycin day 3  Mucinex  Daliresp  Pulmonary consult       Healthcare associate pneumonia. Elevated procalcitonin  Continue IV cefepime and vancomycin for presumable gram-negative and/are MRSA pneumonia. Sputum cultures.       Metabolic encephalopathy. Resolving. Possible UTI. Cultures pending. Continue cefepime and vanc.       Acute on Chronic anemia  Hx of lower GI bleed. pRBC 2 units tx today. I am concerned she is not a candidate for endoscopy with end stage COPD. Monitor Hgb        CAD/HTN- continued home BB,   Clopidogrel stopped due to worsening anemia. atorvastatin  Mildly elevated troponin likely type II demand based ischemia       Mood disorder continued home sertraline       GERD- continued home PPI      Right upper extremity swelling. Obtain venous Doppler   + R superficial venous thrombus. Not a candidate for [facility] if this were to progress due to severe anemia. DVT Prophylaxis: SCD  Diet: Dysphagia. Code Status: Full Code     PT/OT Eval Status:p     Dispo - cc. Very poor prognosis.      Code status and GOC discussed at [facility] - pt and family insisted on full code.     Nando Burciaga MD

## 2021-09-18 NOTE — FLOWSHEET NOTE
09/18/21 1313   Vital Signs   Temp 98.3 °F (36.8 °C)   Temp Source Oral   Pulse 104   Heart Rate Source Monitor   Resp 27   /60   Oxygen Therapy   SpO2 100 %     Pt. First unit of PRBC infusing at this time. This RN remained with patient during first 15 min of infusing pt. Tolerated well, no s/s of adverse reaction noted. Will continue to monitor, pt. Denies needs, call light within reach, pt. Family at bedside.

## 2021-09-18 NOTE — PROGRESS NOTES
09/17/21 2332   NIV Type   Skin Assessment Clean, dry, & intact   Skin Protection for O2 Device Yes   NIV Started/Stopped On   Equipment Type V60   Mode Bilevel   Mask Type Full face mask   Mask Size Medium   Settings/Measurements   IPAP 20 cmH20   CPAP/EPAP 5 cmH2O   Rate Ordered 22   Resp 23   FiO2  35 %   Vt Exhaled 547 mL   Mask Leak (lpm) 24 lpm   Comfort Level Good   Using Accessory Muscles No   SpO2 99   Alarm Settings   Alarms On Y

## 2021-09-18 NOTE — PROGRESS NOTES
Pharmacy Vancomycin Consult     Vancomycin Day: 3  Current Dosing: Pulse  Current indication: HAP      Recent Labs     09/17/21  0505 09/18/21  0550   BUN 18 13   CREATININE 1.0 0.9   WBC 8.1 6.3       Intake/Output Summary (Last 24 hours) at 9/18/2021 0645  Last data filed at 9/18/2021 0438  Gross per 24 hour   Intake 2659.15 ml   Output 1700 ml   Net 959.15 ml         Ht Readings from Last 1 Encounters:   09/16/21 5' 2\" (1.575 m)        Wt Readings from Last 1 Encounters:   09/17/21 106 lb 8 oz (48.3 kg)       Body mass index is 19.48 kg/m². Estimated Creatinine Clearance: 38 mL/min (based on SCr of 0.9 mg/dL). Trough: 16.4    Assessment/Plan:  Will give 250 mg x1 dose and recheck random level in the morning. Pharmacy will continue to monitor.      Bill Burnett, PharmD, [facility], 9/18/2021 6:46 AM

## 2021-09-18 NOTE — PROGRESS NOTES
Pulmonary & Critical Care Medicine ICU Progress Note    Events of Last 24 hours: Worsening anemia  Anxiety and tachycardia with repositioning  Chest pain  Now feeling much better    Invasive Lines:   IV Line: Right IJ CVC    Recent Labs     09/16/21  1230 09/17/21  0935   PHART 7.339* 7.422   [id number] 60.7* 41.8   PO2ART 163.6* 120.5*       IV:   sodium chloride      sodium chloride      dextrose      sodium chloride      norepinephrine Stopped (09/17/21 0624)    dextrose 5 % and 0.45 % NaCl 100 mL/hr at 09/18/21 0431       EXAM:  BP (!) 98/58   Pulse 75   Temp 98.3 °F (36.8 °C) (Axillary)   Resp 22   Ht 5' 2\" (1.575 m)   Wt 106 lb 8 oz (48.3 kg)   SpO2 99%   BMI 19.48 kg/m²  on BiPAP at night, otherwise 1-2 L  CVP: CVP (Mean): 5 mmHg    Intake/Output Summary (Last 24 hours) at 9/18/2021 0648  Last data filed at 9/18/2021 0438  Gross per 24 hour   Intake 2659.15 ml   Output 1700 ml   Net 959.15 ml     General: NAD, looks dramatically better   Eyes: PERRL. No sclera icterus. No conjunctival injection. ENT: No discharge. Pharynx clear. Neck: Trachea midline. Normal thyroid. Right IJ in place. Resp: Cough. No accessory muscle use. No crackles. No wheezing. + rhonchi. No dullness on percussion. CV: Regular rate. Regular rhythm. No mumur or rub. + edema. Right UE edema. peripheral pulses are 2+. Capillary refill is less than 3 seconds. GI: Non-tender. Non-distended. Normal bowel sounds. No hernia. Skin: Warm and dry. No nodule on exposed extremities. No rash on exposed extremities. Bilateral arms weeping. Lymph: No cervical LAD. No supraclavicular LAD. M/S: No cyanosis. No joint deformity. No clubbing. Neuro: Awake. Conversant   Psych: Oriented to person, place, time. No anxiety or agitation.      Medications:   vancomycin  500 mg IntraVENous Once    miconazole nitrate   Topical BID    atorvastatin  20 mg Oral Nightly    clopidogrel  75 mg Oral Daily    docusate sodium  100 mg Oral BID negative  9/16/2021 influenza A & B negative  9/15/2021 blood cultures NGTD 9/17/2021    Imaging:  Head CT 9/15/2021  No acute intracranial abnormality  Partial opacification of the bilateral middle ear cavities which could  indicate otitis media. Small air-fluid levels are noted in the bilateral maxillary and sphenoid  sinuses, correlate clinically for evidence of acute sinusitis. CXR 9/18/2021  No acute infiltrate    UE Doppler 9/17/2021  Evidence of acute totally occluding superficial venous thrombosis involving the right basilic vein at the antecubital fossa. ASSESSMENT:  · Acute on chronic respiratory failure with hypoxemia and refractory hypercapnia, life-threatening respiratory failure. · Was just discharged on home BiPAP/AVAPS after seeing Jas Thrasher  · Acute COPD exacerbation  · Healthcare acquired pneumonia, resolving perihilar and bibasilar opacities. Procalcitonin downtrending  · Hypotensive shock/septic shock, initially fluid responsive - resolved  · Mental status change  · New right upper extremity superficial thrombus 9/17/21  · Possible UTI laboratory finding, pending culture  · Persistently elevated troponin, 0.05 to 0.07   · Hypokalemia  · Leukocytosis, resolved  · Acute on chronic normocytic anemia, worsening  · CAD  · S/P COVID-19 vaccination    PLAN:  · NIPPV HS and PRN work of breathing   · Cefepime D#4 & Vancomycin D#3, monitoring of vancomycin levels to prevent toxicity.    · Transfuse 2 units of blood with lasix between units  · IV Solu-Medrol decrease to daily  · Right IJ CVC 9/16/2021   · Okay to d/c IVF from pulmonary perspectrive

## 2021-09-18 NOTE — FLOWSHEET NOTE
09/18/21 0400   Vital Signs   Temp 98.3 °F (36.8 °C)   Temp Source Axillary   Pulse 70   Heart Rate Source Monitor   Resp 22   /66   MAP (mmHg) 79   Level of Consciousness Alert (0)   MEWS Score 2   Oxygen Therapy   SpO2 98 %   O2 Device PAP (positive airway pressure)   FiO2  35 %   Patient resting on bipap, no s/s distress, IV fluids infusing, UOP only 100 over past few hours, SR with frequent PAC's on monitor. Will continue to monitor.

## 2021-09-18 NOTE — FLOWSHEET NOTE
09/18/21 1915   Vitals   /68   Temp 98.2 °F (36.8 °C)   Temp Source Oral   Pulse 108   Resp (!) 33   SpO2 97 %     Second unit of PRBC completed. Pt tolerated well. No signs of distress noted. Pt. denies further needs at this time. Call light is within reach. Report given to America Jurado RN.

## 2021-09-19 ENCOUNTER — APPOINTMENT (OUTPATIENT)
Dept: GENERAL RADIOLOGY | Age: 81
DRG: 177 | End: 2021-09-19
Payer: MEDICARE

## 2021-09-19 LAB
A/G RATIO: 1.2 (ref 1.1–2.2)
ALBUMIN SERPL-MCNC: 2.9 G/DL (ref 3.4–5)
ALP BLD-CCNC: 60 U/L (ref 40–129)
ALT SERPL-CCNC: 6 U/L (ref 10–40)
ANION GAP SERPL CALCULATED.3IONS-SCNC: 7 MMOL/L (ref 3–16)
AST SERPL-CCNC: 11 U/L (ref 15–37)
BASOPHILS ABSOLUTE: 0 K/UL (ref 0–0.2)
BASOPHILS RELATIVE PERCENT: 0 %
BILIRUB SERPL-MCNC: <0.2 MG/DL (ref 0–1)
BUN BLDV-MCNC: 14 MG/DL (ref 7–20)
CALCIUM SERPL-MCNC: 8.4 MG/DL (ref 8.3–10.6)
CHLORIDE BLD-SCNC: 107 MMOL/L (ref 99–110)
CO2: 27 MMOL/L (ref 21–32)
CREAT SERPL-MCNC: 0.8 MG/DL (ref 0.6–1.2)
EOSINOPHILS ABSOLUTE: 0 K/UL (ref 0–0.6)
EOSINOPHILS RELATIVE PERCENT: 0.2 %
GFR AFRICAN AMERICAN: >60
GFR NON-AFRICAN AMERICAN: >60
GLOBULIN: 2.4 G/DL
GLUCOSE BLD-MCNC: 119 MG/DL (ref 70–99)
GLUCOSE BLD-MCNC: 141 MG/DL (ref 70–99)
GLUCOSE BLD-MCNC: 169 MG/DL (ref 70–99)
HCT VFR BLD CALC: 30.5 % (ref 36–48)
HEMOGLOBIN: 9.7 G/DL (ref 12–16)
LYMPHOCYTES ABSOLUTE: 0.8 K/UL (ref 1–5.1)
LYMPHOCYTES RELATIVE PERCENT: 11.6 %
MCH RBC QN AUTO: 27.6 PG (ref 26–34)
MCHC RBC AUTO-ENTMCNC: 31.9 G/DL (ref 31–36)
MCV RBC AUTO: 86.6 FL (ref 80–100)
MONOCYTES ABSOLUTE: 0.5 K/UL (ref 0–1.3)
MONOCYTES RELATIVE PERCENT: 7.2 %
NEUTROPHILS ABSOLUTE: 5.7 K/UL (ref 1.7–7.7)
NEUTROPHILS RELATIVE PERCENT: 81 %
PDW BLD-RTO: 20.3 % (ref 12.4–15.4)
PERFORMED ON: ABNORMAL
PERFORMED ON: ABNORMAL
PLATELET # BLD: 224 K/UL (ref 135–450)
PMV BLD AUTO: 8 FL (ref 5–10.5)
POTASSIUM SERPL-SCNC: 3.5 MMOL/L (ref 3.5–5.1)
RBC # BLD: 3.52 M/UL (ref 4–5.2)
SODIUM BLD-SCNC: 141 MMOL/L (ref 136–145)
TOTAL PROTEIN: 5.3 G/DL (ref 6.4–8.2)
VANCOMYCIN TROUGH: 16.7 UG/ML (ref 10–20)
WBC # BLD: 7 K/UL (ref 4–11)

## 2021-09-19 PROCEDURE — 85025 COMPLETE CBC W/AUTO DIFF WBC: CPT

## 2021-09-19 PROCEDURE — 2580000003 HC RX 258: Performed by: INTERNAL MEDICINE

## 2021-09-19 PROCEDURE — 80202 ASSAY OF VANCOMYCIN: CPT

## 2021-09-19 PROCEDURE — 94640 AIRWAY INHALATION TREATMENT: CPT

## 2021-09-19 PROCEDURE — 6370000000 HC RX 637 (ALT 250 FOR IP): Performed by: INTERNAL MEDICINE

## 2021-09-19 PROCEDURE — 80053 COMPREHEN METABOLIC PANEL: CPT

## 2021-09-19 PROCEDURE — 6360000002 HC RX W HCPCS: Performed by: INTERNAL MEDICINE

## 2021-09-19 PROCEDURE — C9113 INJ PANTOPRAZOLE SODIUM, VIA: HCPCS | Performed by: INTERNAL MEDICINE

## 2021-09-19 PROCEDURE — 2060000000 HC ICU INTERMEDIATE R&B

## 2021-09-19 PROCEDURE — 94761 N-INVAS EAR/PLS OXIMETRY MLT: CPT

## 2021-09-19 PROCEDURE — 6370000000 HC RX 637 (ALT 250 FOR IP): Performed by: HOSPITALIST

## 2021-09-19 PROCEDURE — 2700000000 HC OXYGEN THERAPY PER DAY

## 2021-09-19 PROCEDURE — 99233 SBSQ HOSP IP/OBS HIGH 50: CPT | Performed by: INTERNAL MEDICINE

## 2021-09-19 PROCEDURE — 94660 CPAP INITIATION&MGMT: CPT

## 2021-09-19 PROCEDURE — 71045 X-RAY EXAM CHEST 1 VIEW: CPT

## 2021-09-19 RX ADMIN — IPRATROPIUM BROMIDE AND ALBUTEROL SULFATE 1 AMPULE: .5; 3 SOLUTION RESPIRATORY (INHALATION) at 19:26

## 2021-09-19 RX ADMIN — GUAIFENESIN 600 MG: 600 TABLET, EXTENDED RELEASE ORAL at 09:57

## 2021-09-19 RX ADMIN — CEFEPIME 2000 MG: 2 INJECTION, POWDER, FOR SOLUTION INTRAVENOUS at 21:17

## 2021-09-19 RX ADMIN — GUAIFENESIN 600 MG: 600 TABLET, EXTENDED RELEASE ORAL at 21:17

## 2021-09-19 RX ADMIN — Medication 2 SPRAY: at 10:00

## 2021-09-19 RX ADMIN — DOCUSATE SODIUM 100 MG: 100 CAPSULE, LIQUID FILLED ORAL at 21:17

## 2021-09-19 RX ADMIN — FERROUS SULFATE TAB 325 MG (65 MG ELEMENTAL FE) 325 MG: 325 (65 FE) TAB at 09:57

## 2021-09-19 RX ADMIN — FLUTICASONE PROPIONATE 2 SPRAY: 50 SPRAY, METERED NASAL at 10:00

## 2021-09-19 RX ADMIN — PANTOPRAZOLE SODIUM 40 MG: 40 INJECTION, POWDER, FOR SOLUTION INTRAVENOUS at 09:55

## 2021-09-19 RX ADMIN — IPRATROPIUM BROMIDE AND ALBUTEROL SULFATE 1 AMPULE: .5; 3 SOLUTION RESPIRATORY (INHALATION) at 22:54

## 2021-09-19 RX ADMIN — IPRATROPIUM BROMIDE AND ALBUTEROL SULFATE 1 AMPULE: .5; 3 SOLUTION RESPIRATORY (INHALATION) at 15:04

## 2021-09-19 RX ADMIN — ATORVASTATIN CALCIUM 20 MG: 10 TABLET, FILM COATED ORAL at 21:18

## 2021-09-19 RX ADMIN — INSULIN LISPRO 1 UNITS: 100 INJECTION, SOLUTION INTRAVENOUS; SUBCUTANEOUS at 17:17

## 2021-09-19 RX ADMIN — METHYLPREDNISOLONE SODIUM SUCCINATE 40 MG: 40 INJECTION, POWDER, FOR SOLUTION INTRAMUSCULAR; INTRAVENOUS at 09:55

## 2021-09-19 RX ADMIN — IPRATROPIUM BROMIDE AND ALBUTEROL SULFATE 1 AMPULE: .5; 3 SOLUTION RESPIRATORY (INHALATION) at 07:52

## 2021-09-19 RX ADMIN — DEXTROSE AND SODIUM CHLORIDE: 5; 450 INJECTION, SOLUTION INTRAVENOUS at 09:43

## 2021-09-19 RX ADMIN — MICONAZOLE NITRATE: 2 OINTMENT TOPICAL at 09:58

## 2021-09-19 RX ADMIN — SODIUM CHLORIDE, PRESERVATIVE FREE 10 ML: 5 INJECTION INTRAVENOUS at 09:58

## 2021-09-19 RX ADMIN — IPRATROPIUM BROMIDE AND ALBUTEROL SULFATE 1 AMPULE: .5; 3 SOLUTION RESPIRATORY (INHALATION) at 11:26

## 2021-09-19 RX ADMIN — ROFLUMILAST 250 MCG: 500 TABLET ORAL at 09:57

## 2021-09-19 RX ADMIN — DOCUSATE SODIUM 100 MG: 100 CAPSULE, LIQUID FILLED ORAL at 09:57

## 2021-09-19 RX ADMIN — DEXTROSE AND SODIUM CHLORIDE: 5; 450 INJECTION, SOLUTION INTRAVENOUS at 21:26

## 2021-09-19 RX ADMIN — VANCOMYCIN HYDROCHLORIDE 500 MG: 500 INJECTION, POWDER, LYOPHILIZED, FOR SOLUTION INTRAVENOUS at 09:48

## 2021-09-19 RX ADMIN — METOPROLOL TARTRATE 25 MG: 25 TABLET, FILM COATED ORAL at 21:17

## 2021-09-19 RX ADMIN — SERTRALINE HYDROCHLORIDE 50 MG: 50 TABLET ORAL at 09:57

## 2021-09-19 RX ADMIN — CEFEPIME 2000 MG: 2 INJECTION, POWDER, FOR SOLUTION INTRAVENOUS at 09:52

## 2021-09-19 RX ADMIN — SODIUM CHLORIDE, PRESERVATIVE FREE 10 ML: 5 INJECTION INTRAVENOUS at 10:04

## 2021-09-19 RX ADMIN — METOPROLOL TARTRATE 25 MG: 25 TABLET, FILM COATED ORAL at 09:58

## 2021-09-19 RX ADMIN — MICONAZOLE NITRATE: 2 OINTMENT TOPICAL at 21:18

## 2021-09-19 ASSESSMENT — PAIN SCALES - WONG BAKER
WONGBAKER_NUMERICALRESPONSE: 0

## 2021-09-19 NOTE — PROGRESS NOTES
Pulmonary Progress Note  CC: Shortness of breath, altered mental status    Subjective:    O2 requirement stable at 2 L    IV line: CVC right IJ    EXAM:   BP (!) 92/53   Pulse 100   Temp 98.2 °F (36.8 °C) (Oral)   Resp 24   Ht 5' 2\" (1.575 m)   Wt 108 lb 4.8 oz (49.1 kg)   SpO2 98%   BMI 19.81 kg/m²  on 2 L  General: NAD, looks dramatically better   Eyes: PERRL. No sclera icterus. No conjunctival injection. ENT: No discharge. Pharynx clear. Neck: Trachea midline. Normal thyroid. Right IJ in place. Resp: Cough. No accessory muscle use. No crackles. No wheezing. + rhonchi. No dullness on percussion. CV: Regular rate. Regular rhythm. No mumur or rub. + edema. Right UE edema. peripheral pulses are 2+. Capillary refill is less than 3 seconds. GI: Non-tender. Non-distended. Normal bowel sounds. No hernia. Skin: Warm and dry. No nodule on exposed extremities. No rash on exposed extremities. Bilateral arms weeping. Lymph: No cervical LAD. No supraclavicular LAD. M/S: No cyanosis. No joint deformity. No clubbing. Neuro: Awake. Conversant   Psych: Oriented to person, place, time. No anxiety or agitation.      Scheduled Meds:   vancomycin  500 mg IntraVENous Once    fluticasone  2 spray Each Nostril Daily    oxymetazoline  2 spray Each Nostril BID    methylPREDNISolone sodium  40 mg IntraVENous Daily    miconazole nitrate   Topical BID    atorvastatin  20 mg Oral Nightly    docusate sodium  100 mg Oral BID    ferrous sulfate  325 mg Oral Daily with breakfast    metoprolol tartrate  25 mg Oral BID    pantoprazole  40 mg IntraVENous Daily    Roflumilast  250 mcg Oral Daily    sertraline  50 mg Oral Daily    cefepime  2,000 mg IntraVENous Q12H    sodium chloride flush  5-40 mL IntraVENous 2 times per day    vancomycin (VANCOCIN) intermittent dosing (placeholder)   Other RX Placeholder    guaiFENesin  600 mg Oral BID    insulin lispro  0-6 Units SubCUTAneous TID WC    insulin lispro  0-3 Units SubCUTAneous Nightly    lidocaine 1 % injection  5 mL IntraDERmal Once    sodium chloride flush  5-40 mL IntraVENous 2 times per day    ipratropium-albuterol  1 ampule Inhalation Q4H While awake     Continuous Infusions:   sodium chloride      sodium chloride      sodium chloride      dextrose      sodium chloride 250 mL (09/18/21 0837)    norepinephrine Stopped (09/17/21 0624)    dextrose 5 % and 0.45 % NaCl 100 mL/hr at 09/18/21 2119     PRN Meds:  sodium chloride, sodium chloride, bisacodyl, sodium chloride flush, sodium chloride, acetaminophen **OR** acetaminophen, albuterol, glucose, dextrose, glucagon (rDNA), dextrose, sodium chloride flush, sodium chloride, ondansetron    Labs:  CBC:   Recent Labs     09/17/21  0505 09/18/21  0550 09/19/21  0523   WBC 8.1 6.3 7.0   HGB 7.0* 6.5* 9.7*   HCT 22.6* 19.9* 30.5*   MCV 92.3 92.2 86.6    232 224     BMP:   Recent Labs     09/17/21  0505 09/18/21  0550 09/19/21  0523    138 141   K 3.9 3.6 3.5    106 107   CO2 26 24 27   BUN 18 13 14   CREATININE 1.0 0.9 0.8     Cultures:  9/16/2021 SARS-CoV-2  negative  9/16/2021 influenza A & B negative  9/15/2021 blood cultures NGTD  9/15/2021 urine culture negative    Imaging:  Head CT 9/15/2021  No acute intracranial abnormality  Partial opacification of the bilateral middle ear cavities which could  indicate otitis media. Small air-fluid levels are noted in the bilateral maxillary and sphenoid  sinuses, correlate clinically for evidence of acute sinusitis. UE Doppler 9/17/2021  Evidence of acute totally occluding superficial venous thrombosis involving the right basilic vein at the antecubital fossa. CXR 9/18/2021  Mild-to-moderate pulmonary vascular congestion worse since yesterday. Gemma Elm left retrocardiac atelectasis or less likely pneumonia.  Mild-to-moderate underlying bullous changes.      ASSESSMENT:  · Acute on chronic respiratory failure with hypoxemia & refractory hypercapnia; life-threatening respiratory failure.   · Recently d/c'd on home BiPAP/AVAPS after seeing Gwyn Landis  · Acute COPD exacerbation  · Healthcare acquired pneumonia, resolving opacities, procal downtrending  · Hypotensive/septic shock, initially fluid responsive - resolved  · Mental status change  · Right upper extremity superficial thrombus 9/17/21  · Persistently elevated troponin, 0.05 to 0.07   · Acute on chronic anemia, s/p 2 units 9/18/21  · CAD  · S/p COVID-19 vaccination    PLAN:  · NIPPV HS and PRN work of breathing   · Antibiotics: Cefepime D#4 & Vancomycin D#4; monitoring of vanc levels to prevent toxicity   · Transfused 2 units of blood with lasix between units 9/18/21  · IV Solu-Medrol qd  · Right IJ CVC 9/16/21   · Full code  · Discharge planning on Cefepime and Vancomycin

## 2021-09-19 NOTE — PROGRESS NOTES
Bedside report given to [facility]. Pt. Denies needs at this time, call light within reach.

## 2021-09-19 NOTE — PROGRESS NOTES
09/18/21 2344   NIV Type   Skin Assessment Clean, dry, & intact   Skin Protection for O2 Device Yes   NIV Started/Stopped On   Equipment Type V60   Mode Bilevel   Mask Type Full face mask   Mask Size Medium   Settings/Measurements   IPAP 20 cmH20   CPAP/EPAP 5 cmH2O   Rate Ordered 22   Resp 24   FiO2  35 %   Vt Exhaled 601 mL   Mask Leak (lpm) 11 lpm   Comfort Level Good   Using Accessory Muscles No   SpO2 98   Alarm Settings   Alarms On Y

## 2021-09-19 NOTE — PROGRESS NOTES
09/19/21 0250   NIV Type   Equipment Type V60   Mode Bilevel   Mask Type Full face mask   Mask Size Medium   Settings/Measurements   IPAP 20 cmH20   CPAP/EPAP 55 cmH2O   Rate Ordered 22   Resp 27   FiO2  35 %   Vt Exhaled 543 mL   Mask Leak (lpm) 18 lpm   Comfort Level Good   Using Accessory Muscles No   SpO2 100   Alarm Settings   Alarms On Y

## 2021-09-19 NOTE — PROGRESS NOTES
Admit: 9/15/2021    Name:  Ronit Friend  Room:  /3460-08  MRN:    [id number]    IM Daily Progress Note for 9/19/2021       Presented from nursing home with acute respiratory distress and altered mental status. Placed on BiPAP  She had just been discharged to the nursing home from the hospital after being admitted for HCAP and acute respiratory failure. Daughter reports recent hx of CVA. Also recent admission to [facility] with severe COPD and PNA. Also hx of lower GI bleed, Hx of anal fissure bleed per daughter's report. Pt with admissions in June, August.   This is her third admission this month alone. Had goals of care discussed at [facility] facility last admission - with diagnosis of End stage COPD - pt and family insist on full code. Interval History:       Awake and alert and conversant this am.   Reports her hearing has much improved last night and she can now hear without issue. Ongoing congestion and productive cough.            Scheduled Meds:   fluticasone  2 spray Each Nostril Daily    oxymetazoline  2 spray Each Nostril BID    methylPREDNISolone sodium  40 mg IntraVENous Daily    miconazole nitrate   Topical BID    atorvastatin  20 mg Oral Nightly    docusate sodium  100 mg Oral BID    ferrous sulfate  325 mg Oral Daily with breakfast    metoprolol tartrate  25 mg Oral BID    pantoprazole  40 mg IntraVENous Daily    Roflumilast  250 mcg Oral Daily    sertraline  50 mg Oral Daily    cefepime  2,000 mg IntraVENous Q12H    sodium chloride flush  5-40 mL IntraVENous 2 times per day    vancomycin (VANCOCIN) intermittent dosing (placeholder)   Other RX Placeholder    guaiFENesin  600 mg Oral BID    insulin lispro  0-6 Units SubCUTAneous TID     insulin lispro  0-3 Units SubCUTAneous Nightly    lidocaine 1 % injection  5 mL IntraDERmal Once    sodium chloride flush  5-40 mL IntraVENous 2 times per day    ipratropium-albuterol  1 ampule Inhalation Q4H While awake       Continuous Infusions:   sodium chloride      sodium chloride      sodium chloride      dextrose      sodium chloride 250 mL (21 0837)    dextrose 5 % and 0.45 % NaCl 100 mL/hr at 21 0943       PRN Meds:  sodium chloride, sodium chloride, bisacodyl, sodium chloride flush, sodium chloride, acetaminophen **OR** acetaminophen, albuterol, glucose, dextrose, glucagon (rDNA), dextrose, sodium chloride flush, sodium chloride, ondansetron                  Objective:     Temp  Av.3 °F (36.8 °C)  Min: 98.1 °F (36.7 °C)  Max: 98.8 °F (37.1 °C)  Pulse  Av.9  Min: 84  Max: 109  BP  Min: 80/48  Max: 141/81  SpO2  Av.5 %  Min: 95 %  Max: 100 %  FiO2   Av %  Min: 35 %  Max: 35 %  Patient Vitals for the past 4 hrs:   BP Temp Temp src Pulse Resp SpO2   21 1126 -- -- -- -- 20 98 %   21 1100 -- -- -- 102 -- 98 %   21 1000 114/67 98.2 °F (36.8 °C) Oral 90 22 97 %   21 0900 (!) 108/54 -- -- 109 -- 96 %         Intake/Output Summary (Last 24 hours) at 2021 1206  Last data filed at 2021 1880  Gross per 24 hour   Intake 593.33 ml   Output 2325 ml   Net -1731.67 ml       Physical Exam:  General:  Awake, alert and oriented. Appears to be not in any distress  Mucous Membranes:  Pink , anicteric  Neck: No JVD, no carotid bruit, no thyromegaly  Chest: Mild increased respiratory effort. Bilateral mild wheezing. No crackles. Cardiovascular:  RRR S1S2 heard, no murmurs or gallops  Abdomen:  Soft, undistended, non tender, no organomegaly, BS present  Extremities: Significant swelling of the right upper extremity up to the elbow. Bruising both lower extremities.   Neurological : no focal deficits    Lab Data:  CBC:   Recent Labs     21  0505 21  0550 21  0523   WBC 8.1 6.3 7.0   RBC 2.45* 2.16* 3.52*   HGB 7.0* 6.5* 9.7*   HCT 22.6* 19.9* 30.5*   MCV 92.3 92.2 86.6   RDW 18.2* 18.3* 20.3*    232 224     BMP:   Recent Labs 09/17/21  0505 09/18/21  0550 09/19/21  0523    138 141   K 3.9 3.6 3.5    106 107   CO2 26 24 27   BUN 18 13 14   CREATININE 1.0 0.9 0.8     BNP: No results for input(s): BNP in the last 72 hours. PT/INR:   No results for input(s): PROTIME, INR in the last 72 hours. APTT:No results for input(s): APTT in the last 72 hours. CARDIAC ENZYMES:   Recent Labs     09/18/21  0550   TROPONINI 0.05*     FASTING LIPID PANEL:  Lab Results   Component Value Date    CHOL 146 11/14/2019    HDL 63 11/14/2019    TRIG 110 11/14/2019     LIVER PROFILE:   Recent Labs     09/19/21  0523   AST 11*   ALT 6*   BILITOT <0.2   ALKPHOS 60         XR CHEST PORTABLE   Final Result   Mild-to-moderate pulmonary vascular congestion worse since yesterday. Mild   left retrocardiac atelectasis or less likely pneumonia. Mild-to-moderate   underlying bullous changes. XR CHEST PORTABLE   Final Result   In this patient with probable changes of COPD, there are stable diffuse   reticular opacities which may represent mild pulmonary edema. Underlying   pneumonitis is not excluded. VL Extremity Venous Right         XR CHEST PORTABLE   Final Result   No significant early change in diffuse bilateral opacity which can reflect   pulmonary edema or atypical infection. XR CHEST PORTABLE   Final Result   Status post right IJ catheter placement in good position with no pneumothorax. Slowly resolving pulmonary congestion. Chronic obstructive lung changes with slowly resolving perihilar and   bibasilar opacities. CT HEAD WO CONTRAST   Final Result   No acute intracranial abnormality. Chronic and age related changes including age-appropriate cerebral volume   loss and scattered nonspecific white matter hypodensities which are likely   the sequela of chronic small vessel ischemic disease. Partial opacification of the bilateral middle ear cavities which could   indicate otitis media.       Small air-fluid levels are noted in the bilateral maxillary and sphenoid   sinuses, correlate clinically for evidence of acute sinusitis. XR CHEST PORTABLE   Final Result   Findings compatible with pulmonary interstitial edema. Given the normal   heart size, consider both cardiogenic and noncardiogenic etiologies,   including atypical infections.          XR CHEST PORTABLE    (Results Pending)     EKG-  Sinus rhythm with Premature supraventricular complexesWhen     Assessment & Plan:     Patient Active Problem List    Diagnosis Date Noted    Acute exacerbation of chronic obstructive pulmonary disease (COPD) ([address]     Acute on chronic respiratory failure with hypoxia and hypercapnia (HCC)     Acute respiratory failure with hypoxia and hypercapnia ([address] 09/16/2021    Shock ([address]     Pneumonia due to infectious organism     Acute respiratory distress 09/09/2021    Volume overload 09/09/2021    Demand ischemia ([address] 09/09/2021    GERD (gastroesophageal reflux disease) 09/09/2021    Severe malnutrition ([address] 08/31/2021    Acute respiratory failure ([address] 08/30/2021    Chronic respiratory failure with hypoxia ([address]     Essential hypertension     Anemia     Chest pain 09/18/2020    GI bleed 02/23/2020    Moderate malnutrition ([address] 09/25/2019    S/P CABG (coronary artery bypass graft) 09/25/2019    Pneumonia of both lower lobes due to methicillin resistant Staphylococcus aureus (MRSA) ([address] 09/25/2019    CAD in native artery 09/24/2019    Mucus plugging of bronchi     Status post aorto-coronary artery bypass graft     NADJA (acute kidney injury) ([address]     Hypernatremia 09/14/2019    Ischemic cardiomyopathy     Acute combined systolic and diastolic congestive heart failure (HCC)     Acute respiratory failure with hypoxia ([address] 09/08/2019    CAD (coronary artery disease) 09/08/2019    Acute pulmonary edema ([address] 09/08/2019    Pulmonary infiltrate 09/08/2019    Elevated brain natriuretic peptide (BNP) level 09/08/2019    Leukocytosis 09/08/2019    NSTEMI (non-ST elevated myocardial infarction) ([facility] 09/03/2019    Abnormal chest x-ray     COPD with acute exacerbation (HCC)     Shortness of breath     Tobacco abuse     Sepsis ([facility] 03/18/2019    COPD exacerbation ([facility] 12/29/2017    OA (osteoarthritis) 08/12/2014    Tobacco use 08/12/2014    Hyperlipidemia 05/07/2013    Asthma 05/07/2013       Acute on chronic hypoxic and hypercarbic respiratory failure  With end-stage COPD with acute exacerbation  3rd admission for same this month alone. BiPAP PRN. Currently on 3 L of oxygen. Continue IV Solu-Medrol  Continue IV cefepime and vancomycin day 4  Mucinex  Daliresp  Pulmonary consult       Healthcare associate pneumonia. Elevated procalcitonin  Continue IV cefepime and vancomycin for presumable gram-negative and/are MRSA pneumonia. Sputum cultures.       Metabolic encephalopathy. Resolved      Possible UTI. Cultures pending. Continue cefepime and vanc.       Acute on Chronic anemia  Hx of lower GI bleed. pRBC 2 units tx 9/18  Hgb up to 9.7 and no signs of active bleeding. I am concerned she is not a candidate for endoscopy with end stage COPD. Monitor Hgb        CAD/HTN- continued home BB,   Clopidogrel stopped due to worsening anemia. atorvastatin  Mildly elevated troponin likely type II demand based ischemia       Mood disorder continued home sertraline       GERD- continued home PPI      Right upper extremity swelling. Obtain venous Doppler   + R superficial venous thrombus. Not a candidate for [facility] if this were to progress - due to severe anemia. DVT Prophylaxis: SCD  Diet: Dysphagia. Code Status: Full Code     PT/OT Eval Status:p     Dispo - cc. Very poor prognosis.      Code status and GOC discussed at [facility] - pt and family insisted on full code.     Frederic Lim MD

## 2021-09-19 NOTE — PROGRESS NOTES
Vancomycin Day # 19  Current dose = Pulse dosing per daily vancomycin levels. BUN/SRCR 14/0.8      Est CrCl = 43 ml/min  WBC   7            Tmax 98.8  Vancomycin random level this am = 16.7 mcg/ml  Will give vancomycin 500 mg x1 dose today and recheck random level tomorrow am.  Pharmacy will continue to obtain daily random vancomycin levels and redose if level less than 18 mcg/ml.

## 2021-09-19 NOTE — FLOWSHEET NOTE
09/19/21 1500   Vital Signs   Temp 97.6 °F (36.4 °C)   Temp Source Axillary   Pulse 88   Heart Rate Source Monitor   Resp 20   /76   BP Location Right upper arm   Level of Consciousness Alert (0)   MEWS Score 1   Oxygen Therapy   SpO2 99 %   O2 Flow Rate (L/min) 2 L/min     Pt. Denies needs at this time, call light within reach.

## 2021-09-20 ENCOUNTER — APPOINTMENT (OUTPATIENT)
Dept: INTERVENTIONAL RADIOLOGY/VASCULAR | Age: 81
DRG: 177 | End: 2021-09-20
Payer: MEDICARE

## 2021-09-20 ENCOUNTER — APPOINTMENT (OUTPATIENT)
Dept: GENERAL RADIOLOGY | Age: 81
DRG: 177 | End: 2021-09-20
Payer: MEDICARE

## 2021-09-20 LAB
A/G RATIO: 1.1 (ref 1.1–2.2)
ALBUMIN SERPL-MCNC: 2.7 G/DL (ref 3.4–5)
ALP BLD-CCNC: 64 U/L (ref 40–129)
ALT SERPL-CCNC: 6 U/L (ref 10–40)
ANION GAP SERPL CALCULATED.3IONS-SCNC: 9 MMOL/L (ref 3–16)
AST SERPL-CCNC: 11 U/L (ref 15–37)
BASOPHILS ABSOLUTE: 0 K/UL (ref 0–0.2)
BASOPHILS RELATIVE PERCENT: 0 %
BILIRUB SERPL-MCNC: <0.2 MG/DL (ref 0–1)
BLOOD CULTURE, ROUTINE: NORMAL
BUN BLDV-MCNC: 10 MG/DL (ref 7–20)
CALCIUM SERPL-MCNC: 8.3 MG/DL (ref 8.3–10.6)
CHLORIDE BLD-SCNC: 106 MMOL/L (ref 99–110)
CO2: 25 MMOL/L (ref 21–32)
CREAT SERPL-MCNC: 0.7 MG/DL (ref 0.6–1.2)
EOSINOPHILS ABSOLUTE: 0.1 K/UL (ref 0–0.6)
EOSINOPHILS RELATIVE PERCENT: 1 %
GFR AFRICAN AMERICAN: >60
GFR NON-AFRICAN AMERICAN: >60
GLOBULIN: 2.5 G/DL
GLUCOSE BLD-MCNC: 106 MG/DL (ref 70–99)
GLUCOSE BLD-MCNC: 137 MG/DL (ref 70–99)
GLUCOSE BLD-MCNC: 278 MG/DL (ref 70–99)
GLUCOSE BLD-MCNC: 92 MG/DL (ref 70–99)
GLUCOSE BLD-MCNC: 99 MG/DL (ref 70–99)
HCT VFR BLD CALC: 32.3 % (ref 36–48)
HEMOGLOBIN: 10.2 G/DL (ref 12–16)
LYMPHOCYTES ABSOLUTE: 0.8 K/UL (ref 1–5.1)
LYMPHOCYTES RELATIVE PERCENT: 10.1 %
MCH RBC QN AUTO: 27.4 PG (ref 26–34)
MCHC RBC AUTO-ENTMCNC: 31.6 G/DL (ref 31–36)
MCV RBC AUTO: 86.8 FL (ref 80–100)
MONOCYTES ABSOLUTE: 0.6 K/UL (ref 0–1.3)
MONOCYTES RELATIVE PERCENT: 7.6 %
NEUTROPHILS ABSOLUTE: 6.6 K/UL (ref 1.7–7.7)
NEUTROPHILS RELATIVE PERCENT: 81.3 %
PDW BLD-RTO: 19.7 % (ref 12.4–15.4)
PERFORMED ON: ABNORMAL
PERFORMED ON: NORMAL
PLATELET # BLD: 214 K/UL (ref 135–450)
PMV BLD AUTO: 7.9 FL (ref 5–10.5)
POTASSIUM SERPL-SCNC: 3.3 MMOL/L (ref 3.5–5.1)
RBC # BLD: 3.72 M/UL (ref 4–5.2)
SODIUM BLD-SCNC: 140 MMOL/L (ref 136–145)
TOTAL PROTEIN: 5.2 G/DL (ref 6.4–8.2)
VANCOMYCIN TROUGH: 16.1 UG/ML (ref 10–20)
WBC # BLD: 8.1 K/UL (ref 4–11)

## 2021-09-20 PROCEDURE — 76937 US GUIDE VASCULAR ACCESS: CPT

## 2021-09-20 PROCEDURE — 36410 VNPNXR 3YR/> PHY/QHP DX/THER: CPT

## 2021-09-20 PROCEDURE — 2580000003 HC RX 258: Performed by: INTERNAL MEDICINE

## 2021-09-20 PROCEDURE — 6370000000 HC RX 637 (ALT 250 FOR IP): Performed by: HOSPITALIST

## 2021-09-20 PROCEDURE — 99233 SBSQ HOSP IP/OBS HIGH 50: CPT | Performed by: INTERNAL MEDICINE

## 2021-09-20 PROCEDURE — 94640 AIRWAY INHALATION TREATMENT: CPT

## 2021-09-20 PROCEDURE — 2060000000 HC ICU INTERMEDIATE R&B

## 2021-09-20 PROCEDURE — 6360000002 HC RX W HCPCS: Performed by: INTERNAL MEDICINE

## 2021-09-20 PROCEDURE — 85025 COMPLETE CBC W/AUTO DIFF WBC: CPT

## 2021-09-20 PROCEDURE — 94761 N-INVAS EAR/PLS OXIMETRY MLT: CPT

## 2021-09-20 PROCEDURE — 05HA33Z INSERTION OF INFUSION DEVICE INTO LEFT BRACHIAL VEIN, PERCUTANEOUS APPROACH: ICD-10-PCS | Performed by: RADIOLOGY

## 2021-09-20 PROCEDURE — 80053 COMPREHEN METABOLIC PANEL: CPT

## 2021-09-20 PROCEDURE — C9113 INJ PANTOPRAZOLE SODIUM, VIA: HCPCS | Performed by: INTERNAL MEDICINE

## 2021-09-20 PROCEDURE — C1751 CATH, INF, PER/CENT/MIDLINE: HCPCS

## 2021-09-20 PROCEDURE — 6370000000 HC RX 637 (ALT 250 FOR IP): Performed by: INTERNAL MEDICINE

## 2021-09-20 PROCEDURE — 94660 CPAP INITIATION&MGMT: CPT

## 2021-09-20 PROCEDURE — 71045 X-RAY EXAM CHEST 1 VIEW: CPT

## 2021-09-20 PROCEDURE — 2700000000 HC OXYGEN THERAPY PER DAY

## 2021-09-20 PROCEDURE — 80202 ASSAY OF VANCOMYCIN: CPT

## 2021-09-20 RX ORDER — PREDNISONE 20 MG/1
40 TABLET ORAL DAILY
Status: DISCONTINUED | OUTPATIENT
Start: 2021-09-21 | End: 2021-09-23 | Stop reason: HOSPADM

## 2021-09-20 RX ADMIN — ACETAMINOPHEN 650 MG: 325 TABLET ORAL at 20:57

## 2021-09-20 RX ADMIN — SODIUM CHLORIDE, PRESERVATIVE FREE 10 ML: 5 INJECTION INTRAVENOUS at 20:58

## 2021-09-20 RX ADMIN — ATORVASTATIN CALCIUM 20 MG: 10 TABLET, FILM COATED ORAL at 20:57

## 2021-09-20 RX ADMIN — MICONAZOLE NITRATE: 2 OINTMENT TOPICAL at 09:00

## 2021-09-20 RX ADMIN — GUAIFENESIN 600 MG: 600 TABLET, EXTENDED RELEASE ORAL at 08:52

## 2021-09-20 RX ADMIN — SODIUM CHLORIDE, PRESERVATIVE FREE 10 ML: 5 INJECTION INTRAVENOUS at 10:00

## 2021-09-20 RX ADMIN — MICONAZOLE NITRATE: 2 OINTMENT TOPICAL at 20:58

## 2021-09-20 RX ADMIN — CEFEPIME 2000 MG: 2 INJECTION, POWDER, FOR SOLUTION INTRAVENOUS at 08:51

## 2021-09-20 RX ADMIN — IPRATROPIUM BROMIDE AND ALBUTEROL SULFATE 1 AMPULE: .5; 3 SOLUTION RESPIRATORY (INHALATION) at 23:05

## 2021-09-20 RX ADMIN — ROFLUMILAST 250 MCG: 500 TABLET ORAL at 08:52

## 2021-09-20 RX ADMIN — PANTOPRAZOLE SODIUM 40 MG: 40 INJECTION, POWDER, FOR SOLUTION INTRAVENOUS at 08:52

## 2021-09-20 RX ADMIN — GUAIFENESIN 600 MG: 600 TABLET, EXTENDED RELEASE ORAL at 20:58

## 2021-09-20 RX ADMIN — IPRATROPIUM BROMIDE AND ALBUTEROL SULFATE 1 AMPULE: .5; 3 SOLUTION RESPIRATORY (INHALATION) at 12:21

## 2021-09-20 RX ADMIN — IPRATROPIUM BROMIDE AND ALBUTEROL SULFATE 1 AMPULE: .5; 3 SOLUTION RESPIRATORY (INHALATION) at 16:31

## 2021-09-20 RX ADMIN — VANCOMYCIN HYDROCHLORIDE 500 MG: 500 INJECTION, POWDER, LYOPHILIZED, FOR SOLUTION INTRAVENOUS at 08:49

## 2021-09-20 RX ADMIN — METOPROLOL TARTRATE 25 MG: 25 TABLET, FILM COATED ORAL at 20:57

## 2021-09-20 RX ADMIN — Medication 2 SPRAY: at 21:07

## 2021-09-20 RX ADMIN — ONDANSETRON HYDROCHLORIDE 4 MG: 2 INJECTION, SOLUTION INTRAMUSCULAR; INTRAVENOUS at 17:42

## 2021-09-20 RX ADMIN — ONDANSETRON HYDROCHLORIDE 4 MG: 2 INJECTION, SOLUTION INTRAMUSCULAR; INTRAVENOUS at 09:58

## 2021-09-20 RX ADMIN — SODIUM CHLORIDE, PRESERVATIVE FREE 10 ML: 5 INJECTION INTRAVENOUS at 10:01

## 2021-09-20 RX ADMIN — SODIUM CHLORIDE, PRESERVATIVE FREE 10 ML: 5 INJECTION INTRAVENOUS at 21:08

## 2021-09-20 RX ADMIN — IPRATROPIUM BROMIDE AND ALBUTEROL SULFATE 1 AMPULE: .5; 3 SOLUTION RESPIRATORY (INHALATION) at 19:14

## 2021-09-20 RX ADMIN — IPRATROPIUM BROMIDE AND ALBUTEROL SULFATE 1 AMPULE: .5; 3 SOLUTION RESPIRATORY (INHALATION) at 07:04

## 2021-09-20 RX ADMIN — Medication 2 SPRAY: at 08:55

## 2021-09-20 RX ADMIN — CEFEPIME 2000 MG: 2 INJECTION, POWDER, FOR SOLUTION INTRAVENOUS at 21:03

## 2021-09-20 RX ADMIN — SERTRALINE HYDROCHLORIDE 50 MG: 50 TABLET ORAL at 08:53

## 2021-09-20 RX ADMIN — METHYLPREDNISOLONE SODIUM SUCCINATE 40 MG: 40 INJECTION, POWDER, FOR SOLUTION INTRAMUSCULAR; INTRAVENOUS at 08:52

## 2021-09-20 RX ADMIN — METOPROLOL TARTRATE 25 MG: 25 TABLET, FILM COATED ORAL at 08:52

## 2021-09-20 RX ADMIN — DOCUSATE SODIUM 100 MG: 100 CAPSULE, LIQUID FILLED ORAL at 08:30

## 2021-09-20 RX ADMIN — INSULIN LISPRO 3 UNITS: 100 INJECTION, SOLUTION INTRAVENOUS; SUBCUTANEOUS at 17:02

## 2021-09-20 RX ADMIN — DEXTROSE AND SODIUM CHLORIDE: 5; 450 INJECTION, SOLUTION INTRAVENOUS at 18:41

## 2021-09-20 RX ADMIN — FERROUS SULFATE TAB 325 MG (65 MG ELEMENTAL FE) 325 MG: 325 (65 FE) TAB at 08:53

## 2021-09-20 RX ADMIN — FLUTICASONE PROPIONATE 2 SPRAY: 50 SPRAY, METERED NASAL at 08:55

## 2021-09-20 ASSESSMENT — PAIN SCALES - WONG BAKER

## 2021-09-20 ASSESSMENT — PAIN SCALES - GENERAL: PAINLEVEL_OUTOF10: 6

## 2021-09-20 NOTE — PROGRESS NOTES
RESPIRATORY THERAPY ASSESSMENT    Name:  Federico Nazario  Medical Record Number:  [id number]  Age: [de-identified] y.o. Gender: female  : 1940  Today's Date:  2021  Room:  /0315-01    Assessment     Is the patient being admitted for a COPD or Asthma exacerbation? No   (If yes the patient will be seen every 4 hours for the first 24 hours and then reassessed)    Patient Admission Diagnosis      Allergies  Allergies   Allergen Reactions    Latex      Burning      Codeine      \"hallucinations\"       Minimum Predicted Vital Capacity:               Actual Vital Capacity:                    Pulmonary History:copd/asthma  Home Oxygen Therapy:  2-2.5L  Home Respiratory Therapy:albuterol prn/pulmicort bid/symbicort/duoneb/spiriva   Current Respiratory Therapy:  duoneb q4wa  Treatment Type: HHN  Medications: Albuterol/Ipratropium    Respiratory Severity Index(RSI)   Patients with orders for inhalation medications, oxygen, or any therapeutic treatment modality will be placed on Respiratory Protocol. They will be assessed with the first treatment and at least every 72 hours thereafter. The following severity scale will be used to determine frequency of treatment intervention.     Smoking History: Pulmonary Disease or Smoking History, Greater than 15 pack year = 2    Social History  Social History     Tobacco Use    Smoking status: Former Smoker     Packs/day: 0.50     Years: 50.00     Pack years: 25.00     Types: Cigarettes     Quit date: 2019     Years since quittin.0    Smokeless tobacco: Never Used    Tobacco comment: advised to quit   Vaping Use    Vaping Use: Never assessed   Substance Use Topics    Alcohol use: No    Drug use: No       Recent Surgical History: None = 0  Past Surgical History  Past Surgical History:   Procedure Laterality Date    BRONCHOSCOPY  2019    Dr. Wilton gray/ALIYA    CARDIAC CATHETERIZATION  2019    Dr. Jaime Peña 2/24/2020    COLONOSCOPY DIAGNOSTIC performed by Mikki Comer DO at [address] N/A 9/19/2019    OFF PUMP CORONARY ARTERY BYPASS GRAFTING X2, INTERNAL MAMMARY ARTERY, SAPHENOUS VEIN GRAFT performed by Renetta Wright MD at [address], PARTIAL Left     SIGMOIDOSCOPY  02/27/2020    4 bands    SIGMOIDOSCOPY N/A 2/27/2020    FLEX SIG W/ BANDING SIGMOIDOSCOPY DIAGNOSTIC FLEXIBLE performed by Mikki Comer DO at [address]       Level of Consciousness: Alert, Follows Commands but Disoriented = 1    Level of Activity: Mostly sedentary, minimal walking = 2    Respiratory Pattern: Dyspnea with exertion;Irregular pattern;or RR less than 6 = 2    Breath Sounds: Diminshed bilaterally and/or crackles = 2    Sputum  Sputum Color: None, Tenacity: None, Sputum How Obtained: Spontaneous cough  Cough: Strong, spontaneous, non-productive = 0    Vital Signs   /70   Pulse 103   Temp 97.1 °F (36.2 °C) (Oral)   Resp 25   Ht 5' 2\" (1.575 m)   Wt 108 lb 4.8 oz (49.1 kg)   SpO2 99%   BMI 19.81 kg/m²   SPO2 (COPD values may differ): 86-87% on room air or greater than 92% on FiO2 35- 50% = 3    Peak Flow (asthma only): not applicable = 0    RSI: 94-84 = Q6H or QID and Q4HPRN for dyspnea        Plan       Goals: medication delivery and improve oxygenation    Patient/caregiver was educated on the proper method of use for Respiratory Care Devices:  Yes      Level of patient/caregiver understanding able to:   ? Verbalize understanding   ? Demonstrate understanding       ? Teach back        ? Needs reinforcement       ? No available caregiver               ? Other:     Response to education:  Good     Is patient being placed on Home Treatment Regimen? No     Does the patient have everything they need prior to discharge?   NA     Comments: chart reviewed and patient assessed     Plan of Care: keep duoneb scheduled q4wa for sob    Electronically signed by Rosina Oropeza RCP on 9/20/2021 at 12:29 AM    Respiratory Protocol Guidelines     1. Assessment and treatment by Respiratory Therapy will be initiated for medication and therapeutic interventions upon initiation of aerosolized medication. 2. Physician will be contacted for respiratory rate (RR) greater than 35 breaths per minute. Therapy will be held for heart rate (HR) greater than 140 beats per minute, pending direction from physician. 3. Bronchodilators will be administered via Metered Dose Inhaler (MDI) with spacer when the following criteria are met:  a. Alert and cooperative     b. HR < 140 bpm  c. RR < 30 bpm                d. Can demonstrate a 2-3 second inspiratory hold  4. Bronchodilators will be administered via Hand Held Nebulizer [facility]) to patients when ANY of the following criteria are met  a. Incognizant or uncooperative          b. Patients treated with HHN at Home        c. Unable to demonstrate proper use of MDI with spacer     d. RR > 30 bpm   5. Bronchodilators will be delivered via Metered Dose Inhaler (MDI), HHN, Aerogen to intubated patients on mechanical ventilation. 6. Inhalation medication orders will be delivered and/or substituted as outlined below. Aerosolized Medications Ordering and Administration Guidelines:    1. All Medications will be ordered by a physician, and their frequency and/or modality will be adjusted as defined by the patients Respiratory Severity Index (RSI) score. 2. If the patient does not have documented COPD, consider discontinuing anticholinergics when RSI is less than 9.  3. If the bronchospasm worsens (increased RSI), then the bronchodilator frequency can be increased to a maximum of every 4 hours. If greater than every 4 hours is required, the physician will be contacted.   4. If the bronchospasm improves, the frequency of the bronchodilator can be decreased, based on the patient's RSI, but not less than home treatment regimen frequency. 5. Bronchodilator(s) will be discontinued if patient has a RSI less than 9 and has received no scheduled or as needed treatment for 72  Hrs. Patients Ordered on a Mucolytic Agent:    1. Must always be administered with a bronchodilator. 2. Discontinue if patient experiences worsened bronchospasm, or secretions have lessened to the point that the patient is able to clear them with a cough. Anti-inflammatory and Combination Medications:    1. If the patient lacks prior history of lung disease, is not using inhaled anti-inflammatory medication at home, and lacks wheezing by examination or by history for at least 24 hours, contact physician for possible discontinuation.

## 2021-09-20 NOTE — PROGRESS NOTES
Pharmacy Vancomycin Consult     Vancomycin Day: 5  Current Dosing: pulse dosing  Current indication: HAP    Temp max:  97.3    Recent Labs     09/19/21  0523 09/20/21  0435 09/20/21  0559   BUN 14  --  10   CREATININE 0.8  --  0.7   WBC 7.0 8.1  --        Intake/Output Summary (Last 24 hours) at 9/20/2021 4543  Last data filed at 9/20/2021 0030  Gross per 24 hour   Intake --   Output 1100 ml   Net -1100 ml     Culture Date      Source                       Results      Ht Readings from Last 1 Encounters:   09/16/21 5' 2\" (1.575 m)        Wt Readings from Last 1 Encounters:   09/20/21 103 lb (46.7 kg)       Body mass index is 18.84 kg/m². Estimated Creatinine Clearance: 47 mL/min (based on SCr of 0.7 mg/dL). Vanc level: 16.1    Assessment/Plan:  Patient's renal function is stable and consistent trough levels of 16 are being maintained by 500 mg pulse doses of vancomycin daily. Will place an order for vancomycin 500 mg IVPB q24h. Dose due @ 0800.

## 2021-09-20 NOTE — PROGRESS NOTES
09/19/21 9507   NIV Type   $NIV $Daily Charge   Skin Assessment Clean, dry, & intact   Skin Protection for O2 Device Yes   Equipment Type v60   Mode Bilevel   Mask Type Full face mask   Mask Size Medium   Settings/Measurements   IPAP 20 cmH20   CPAP/EPAP 5 cmH2O   Rate Ordered 22   Resp 25   FiO2  35 %   Vt Exhaled 546 mL   Minute Volume 13.9 Liters   Mask Leak (lpm) 4 lpm   Comfort Level Good   Using Accessory Muscles No   SpO2 98   Patient Observation   Observations spo2 98% on 35% bipap

## 2021-09-20 NOTE — FLOWSHEET NOTE
09/20/21 1600   Vital Signs   Temp 98 °F (36.7 °C)   Temp Source Oral   Pulse 106   Heart Rate Source Monitor   Resp 20   BP (!) 147/86   BP Location Right upper arm   Patient Position Semi fowlers   Level of Consciousness Alert (0)   MEWS Score 2   Pain Assessment   Burt-Baker Pain Rating 0   Response to Pain Intervention Patient Satisfied   Oxygen Therapy   SpO2 98 %   O2 Flow Rate (L/min) 2 L/min     Pt. Denies any needs at this time, call light within reach.

## 2021-09-20 NOTE — PROGRESS NOTES
Admit: 9/15/2021    Name:  Janey [facility]  Room:  /7614-65  MRN:    [id number]    IM Daily Progress Note for 9/20/2021       Presented from nursing home with acute respiratory distress and altered mental status. Placed on BiPAP  She had just been discharged to the nursing home from the hospital after being admitted for HCAP and acute respiratory failure. Daughter reports recent hx of CVA. Also recent admission to [facility] with severe COPD and PNA. hx of lower GI bleed, Hx of anal fissure bleed per daughter's report. Pt with admissions in June, August.   This is her third admission this month alone. Had goals of care discussed at [facility] facility last admission - with diagnosis of End stage COPD - pt and family insist on full code.              Interval History:     Ms Smith used bipap overnight   Now on 2 L oxygen   Feels improving slowly       Scheduled Meds:   vancomycin  500 mg IntraVENous Q24H    fluticasone  2 spray Each Nostril Daily    oxymetazoline  2 spray Each Nostril BID    methylPREDNISolone sodium  40 mg IntraVENous Daily    miconazole nitrate   Topical BID    atorvastatin  20 mg Oral Nightly    docusate sodium  100 mg Oral BID    ferrous sulfate  325 mg Oral Daily with breakfast    metoprolol tartrate  25 mg Oral BID    pantoprazole  40 mg IntraVENous Daily    Roflumilast  250 mcg Oral Daily    sertraline  50 mg Oral Daily    cefepime  2,000 mg IntraVENous Q12H    sodium chloride flush  5-40 mL IntraVENous 2 times per day    guaiFENesin  600 mg Oral BID    insulin lispro  0-6 Units SubCUTAneous TID WC    insulin lispro  0-3 Units SubCUTAneous Nightly    lidocaine 1 % injection  5 mL IntraDERmal Once    sodium chloride flush  5-40 mL IntraVENous 2 times per day    ipratropium-albuterol  1 ampule Inhalation Q4H While awake       Continuous Infusions:   sodium chloride      sodium chloride      sodium chloride      dextrose      sodium chloride 250 mL (21 0837)    dextrose 5 % and 0.45 % NaCl 100 mL/hr at 21 2126       PRN Meds:  sodium chloride, sodium chloride, bisacodyl, sodium chloride flush, sodium chloride, acetaminophen **OR** acetaminophen, albuterol, glucose, dextrose, glucagon (rDNA), dextrose, sodium chloride flush, sodium chloride, ondansetron                  Objective:     Temp  Av.5 °F (36.4 °C)  Min: 97.1 °F (36.2 °C)  Max: 98.2 °F (36.8 °C)  Pulse  Av.8  Min: 85  Max: 109  BP  Min: 105/62  Max: 132/76  SpO2  Av.2 %  Min: 96 %  Max: 99 %  FiO2   Av %  Min: 35 %  Max: 35 %  Patient Vitals for the past 4 hrs:   BP Temp Temp src Pulse Resp SpO2   21 0704 -- -- -- -- 20 98 %   21 0415 110/70 97.1 °F (36.2 °C) Oral 85 18 99 %         Intake/Output Summary (Last 24 hours) at 2021 0727  Last data filed at 2021 0030  Gross per 24 hour   Intake --   Output 1100 ml   Net -1100 ml       Physical Exam:      General:  Elderly female up in bed, visibly dyspneic   Awake, alert and oriented. Appears to be not in any distress  Mucous Membranes:  Pink , anicteric  Neck: No JVD, no carotid bruit, no thyromegaly  Chest:  Improving justa air entry , ++ accessory muscle use  Occasional wheeze   Cardiovascular:  RRR S1S2 heard, no murmurs or gallops  Abdomen:  Soft, undistended, non tender, no organomegaly, BS present  Extremities: scattered ecchymoses  +right neck TLC  No edema or cyanosis.  Distal pulses well felt  Neurological : grossly normal           Lab Data:  CBC:   Recent Labs     21  0550 21  0523 21  0435   WBC 6.3 7.0 8.1   RBC 2.16* 3.52* 3.72*   HGB 6.5* 9.7* 10.2*   HCT 19.9* 30.5* 32.3*   MCV 92.2 86.6 86.8   RDW 18.3* 20.3* 19.7*    224 214     BMP:   Recent Labs     21  0550 21  0523 21  0559    141 140   K 3.6 3.5 3.3*    107 106   CO2 24 27 25   BUN 13 14 10   CREATININE 0.9 0.8 0.7     CARDIAC ENZYMES:   Recent Labs     21  0550 XR CHEST PORTABLE    (Results Pending)   XR CHEST PORTABLE    (Results Pending)     EKG-  Sinus rhythm with Premature supraventricular complexesWhen     Assessment & Plan:         Acute on chronic hypoxic and hypercarbic respiratory failure  With end-stage COPD with acute exacerbation  3rd admission for same this month alone. BiPAP PRN. Currently on 3 L of oxygen. Continue IV Solu-Medrol  Continue IV cefepime and vancomycin day 5 for HCAP  Mucinex  Daliresp  Pulmonary consulted       Healthcare associate pneumonia  Elevated procalcitonin  Continue IV cefepime and vancomycin for presumable gram-negative and/are MRSA pneumonia. Sputum cultures pending  Improving resp status slowly   Given recurrent admissions, pulm recommends 14 days of cefepime and vanc       Metabolic encephalopathy - sec to hypercarbia   Resolved      Possible UTI. Cultures remain neg UTI ruled out        Acute on Chronic anemia  Hx of lower GI bleed. pRBC 2 units tx 9/18  Hgb up to 9.7 and no signs of active bleeding. I am concerned she is not a candidate for endoscopy with end stage COPD. Monitor Hgb        CAD/HTN- continued home BB,   Clopidogrel stopped due to worsening anemia. atorvastatin  Mildly elevated troponin likely type II demand based ischemia       Mood disorder continued home sertraline       GERD- continued home PPI      Right upper extremity swelling. Obtain venous Doppler   + R superficial venous thrombus. Not a candidate for [facility] if this were to progress - due to severe anemia. DVT Prophylaxis: SCD  Diet: Dysphagia. Code Status: Full Code     PT/OT Eval Status:p     Dispo - cc. Very poor prognosis. pt and family insisted on full code.     pt refusing SNF, might be an issue at [place] as she [place] alone     Shelbi Wadsworth MD, 9/20/2021 7:29 AM

## 2021-09-20 NOTE — PROGRESS NOTES
Bath given & full linen change, sacrum mepolex removed, buttock open to air to promote skin integrity and medicated barrier ointment applied to buttock. Skin prep applied to bilateral heels and heel mepolex applied. Pt. IJ removed, pressure held - no complications. Pt denies needs at this time. Call light within reach.

## 2021-09-20 NOTE — CARE COORDINATION
INTERDISCIPLINARY PLAN OF CARE CONFERENCE    Date/Time: 9/20/2021 3:56 PM  Completed by: Last Rodrigues RN, Case Management      Patient Name:  Reji Martinez  YOB: 1940  Admitting Diagnosis: Acute respiratory failure with hypoxia ([address] [J96.01]  Acute respiratory failure with hypoxia and hypercapnia ([address] [J96.01, J96.02]  Pneumonia due to infectious organism, unspecified laterality, unspecified part of lung [J18.9]     Admit Date/Time:  9/15/2021  5:28 PM    Chart reviewed. Interdisciplinary team contacted or reviewed plan related to patient progress and discharge plans. Disciplines included Case Management, Nursing, and Dietitian. Current Status:inpt  PT/OT recommendation for discharge plan of care: tbd    Expected D/C Disposition:  Home    Discharge Plan Comments: plan continues home with [facility]. Pt from [facility] and family would like home at d/c.  Awaiting PT/OT rec. following    Home O2 in place on admit: Yes

## 2021-09-20 NOTE — PROGRESS NOTES
Pt. Assessment completed - see flowsheet. Pt. Denies any needs at this time, call light within reach.

## 2021-09-20 NOTE — PROGRESS NOTES
Medications given, see MAR. Shift assessment complete, see flowsheets. IVF infusing in RIJ. Positioned for comfort. Pt denies any needs or discomforts at this time. Call light in easy reach, bedside table in easy reach, and bed in lowest position.   Melo Levi, RN

## 2021-09-20 NOTE — PROGRESS NOTES
2 family members came to visit patient. Per Dr. Teresa Gibbs family is allowed in. Family was disrespectful to staff and non-compliant with COVID-19 PPE. Family member stormed out of room and refused to remove gown that was worn in the pt's room. Family found \"wandering\" around on the 2nd floor (per another staff member) with contaminated PPE still on, and asked to leave the building. No family will be allowed in tonight unless called specifically by this RN.

## 2021-09-20 NOTE — PROGRESS NOTES
Pulmonary Progress Note  CC: Shortness of breath, altered mental status    Subjective:    BiPAP overnight  Feels good but breathing is a little worse because anxious about transition to home     IV line: CVC right IJ    EXAM:   /70   Pulse 85   Temp 97.1 °F (36.2 °C) (Oral)   Resp 20   Ht 5' 2\" (1.575 m)   Wt 103 lb (46.7 kg)   SpO2 98%   BMI 18.84 kg/m²  on 2 L  General: NAD, looking better   Eyes: PERRL. No sclera icterus. No conjunctival injection. ENT: No discharge. Pharynx clear. Neck: Trachea midline. Normal thyroid. Right IJ in place. Resp: Tachypnea with minimal accessory muscle use. No crackles. + mild wheezing. + rhonchi. No dullness on percussion. CV: Regular rate. Regular rhythm. No mumur or rub. No edema. Right UE edema. peripheral pulses are 2+. Capillary refill is less than 3 seconds. GI: Non-tender. Non-distended. Normal bowel sounds. No hernia. Skin: Warm and dry. No nodule on exposed extremities. No rash on exposed extremities. Bilateral arms weeping. Lymph: No cervical LAD. No supraclavicular LAD. M/S: No cyanosis. No joint deformity. No clubbing. Neuro: Awake. Conversant   Psych: Oriented to person, place, time. + anxiety. No agitation.      Scheduled Meds:   vancomycin  500 mg IntraVENous Q24H    fluticasone  2 spray Each Nostril Daily    oxymetazoline  2 spray Each Nostril BID    methylPREDNISolone sodium  40 mg IntraVENous Daily    miconazole nitrate   Topical BID    atorvastatin  20 mg Oral Nightly    docusate sodium  100 mg Oral BID    ferrous sulfate  325 mg Oral Daily with breakfast    metoprolol tartrate  25 mg Oral BID    pantoprazole  40 mg IntraVENous Daily    Roflumilast  250 mcg Oral Daily    sertraline  50 mg Oral Daily    cefepime  2,000 mg IntraVENous Q12H    sodium chloride flush  5-40 mL IntraVENous 2 times per day    guaiFENesin  600 mg Oral BID    insulin lispro  0-6 Units SubCUTAneous TID     insulin lispro  0-3 Units SubCUTAneous Nightly    lidocaine 1 % injection  5 mL IntraDERmal Once    sodium chloride flush  5-40 mL IntraVENous 2 times per day    ipratropium-albuterol  1 ampule Inhalation Q4H While awake     Continuous Infusions:   sodium chloride      sodium chloride      sodium chloride      dextrose      sodium chloride 250 mL (09/18/21 0837)    dextrose 5 % and 0.45 % NaCl 100 mL/hr at 09/19/21 2126     PRN Meds:  sodium chloride, sodium chloride, bisacodyl, sodium chloride flush, sodium chloride, acetaminophen **OR** acetaminophen, albuterol, glucose, dextrose, glucagon (rDNA), dextrose, sodium chloride flush, sodium chloride, ondansetron    Labs:  CBC:   Recent Labs     09/18/21  0550 09/19/21  0523 09/20/21  0435   WBC 6.3 7.0 8.1   HGB 6.5* 9.7* 10.2*   HCT 19.9* 30.5* 32.3*   MCV 92.2 86.6 86.8    224 214     BMP:   Recent Labs     09/18/21  0550 09/19/21  0523 09/20/21  0559    141 140   K 3.6 3.5 3.3*    107 106   CO2 24 27 25   BUN 13 14 10   CREATININE 0.9 0.8 0.7     Cultures:  9/16/2021 SARS-CoV-2  negative  9/16/2021 influenza A & B negative  9/15/2021 blood cultures NGTD  9/15/2021 urine culture negative    Imaging:  Head CT 9/15/2021  No acute intracranial abnormality  Partial opacification of the bilateral middle ear cavities which could  indicate otitis media. Small air-fluid levels are noted in the bilateral maxillary and sphenoid  sinuses, correlate clinically for evidence of acute sinusitis. UE Doppler 9/17/2021  Evidence of acute totally occluding superficial venous thrombosis involving the right basilic vein at the antecubital fossa. CXR 9/20/2021  Unchanged, CVC in place    ASSESSMENT:  · Acute on chronic respiratory failure with hypoxemia & refractory hypercapnia; life-threatening respiratory failure.   · Recently d/c'd on home BiPAP/AVAPS after seeing Leopoldo Memos  · Acute COPD exacerbation  · Healthcare acquired pneumonia, resolving opacities, procal downtrending  · Hypotensive/septic shock, initially fluid responsive - resolved  · Mental status change  · Right upper extremity superficial thrombus 9/17/21  · Persistently elevated troponin, 0.05 to 0.07   · Acute on chronic anemia, s/p 2 units 9/18/21  · CAD  · S/p COVID-19 vaccination    PLAN:  · NIPPV HS and PRN work of breathing   · Antibiotics: Cefepime D#5 & Vancomycin D#5; monitoring of vanc levels to prevent toxicity.   Consider 14 total day therapy since recurrent hospitalizations & severe underlying structural lung disease yet commended  · Transfused 2 units of blood with lasix between units 9/18/21  · IV Solu-Medrol qd - change to prednisone   · Right IJ CVC 9/16/21 - needs to be discontinued, probably will need PICC for outpatient antibiotics  · Full code  · Discharge planning okay with me on Cefepime and Vancomycin

## 2021-09-20 NOTE — PROGRESS NOTES
09/20/21 0236   NIV Type   Equipment Type v60   Mode Bilevel   Mask Type Full face mask   Mask Size Medium   Settings/Measurements   IPAP 20 cmH20   CPAP/EPAP 5 cmH2O   Rate Ordered 22   Resp 25   FiO2  35 %   Vt Exhaled 622 mL   Minute Volume 15.1 Liters   Mask Leak (lpm) 12 lpm   Comfort Level Good   Using Accessory Muscles No   SpO2 99   Patient Observation   Observations spo2 99% on 35% bipap

## 2021-09-20 NOTE — PROGRESS NOTES
Bedside report given to [facility]. Pt. Denies needs at this time, call light within reach.

## 2021-09-21 LAB
A/G RATIO: 1.1 (ref 1.1–2.2)
ALBUMIN SERPL-MCNC: 2.9 G/DL (ref 3.4–5)
ALP BLD-CCNC: 69 U/L (ref 40–129)
ALT SERPL-CCNC: 8 U/L (ref 10–40)
ANION GAP SERPL CALCULATED.3IONS-SCNC: 8 MMOL/L (ref 3–16)
AST SERPL-CCNC: 14 U/L (ref 15–37)
BASOPHILS ABSOLUTE: 0.1 K/UL (ref 0–0.2)
BASOPHILS RELATIVE PERCENT: 0.6 %
BILIRUB SERPL-MCNC: <0.2 MG/DL (ref 0–1)
BUN BLDV-MCNC: 10 MG/DL (ref 7–20)
CALCIUM SERPL-MCNC: 8.3 MG/DL (ref 8.3–10.6)
CHLORIDE BLD-SCNC: 107 MMOL/L (ref 99–110)
CO2: 25 MMOL/L (ref 21–32)
CREAT SERPL-MCNC: 0.9 MG/DL (ref 0.6–1.2)
EOSINOPHILS ABSOLUTE: 0.1 K/UL (ref 0–0.6)
EOSINOPHILS RELATIVE PERCENT: 0.9 %
GFR AFRICAN AMERICAN: >60
GFR NON-AFRICAN AMERICAN: >60
GLOBULIN: 2.6 G/DL
GLUCOSE BLD-MCNC: 104 MG/DL (ref 70–99)
GLUCOSE BLD-MCNC: 154 MG/DL (ref 70–99)
GLUCOSE BLD-MCNC: 159 MG/DL (ref 70–99)
GLUCOSE BLD-MCNC: 93 MG/DL (ref 70–99)
GLUCOSE BLD-MCNC: 96 MG/DL (ref 70–99)
HCT VFR BLD CALC: 34.8 % (ref 36–48)
HEMOGLOBIN: 10.9 G/DL (ref 12–16)
LYMPHOCYTES ABSOLUTE: 1.1 K/UL (ref 1–5.1)
LYMPHOCYTES RELATIVE PERCENT: 10.9 %
MCH RBC QN AUTO: 27.6 PG (ref 26–34)
MCHC RBC AUTO-ENTMCNC: 31.3 G/DL (ref 31–36)
MCV RBC AUTO: 88.3 FL (ref 80–100)
MONOCYTES ABSOLUTE: 0.7 K/UL (ref 0–1.3)
MONOCYTES RELATIVE PERCENT: 6.5 %
NEUTROPHILS ABSOLUTE: 8.2 K/UL (ref 1.7–7.7)
NEUTROPHILS RELATIVE PERCENT: 81.1 %
PDW BLD-RTO: 20.1 % (ref 12.4–15.4)
PERFORMED ON: ABNORMAL
PERFORMED ON: NORMAL
PLATELET # BLD: 222 K/UL (ref 135–450)
PMV BLD AUTO: 8 FL (ref 5–10.5)
POTASSIUM SERPL-SCNC: 3.3 MMOL/L (ref 3.5–5.1)
RBC # BLD: 3.94 M/UL (ref 4–5.2)
SODIUM BLD-SCNC: 140 MMOL/L (ref 136–145)
TOTAL PROTEIN: 5.5 G/DL (ref 6.4–8.2)
VANCOMYCIN TROUGH: 15.8 UG/ML (ref 10–20)
WBC # BLD: 10.1 K/UL (ref 4–11)

## 2021-09-21 PROCEDURE — 6370000000 HC RX 637 (ALT 250 FOR IP): Performed by: HOSPITALIST

## 2021-09-21 PROCEDURE — 85025 COMPLETE CBC W/AUTO DIFF WBC: CPT

## 2021-09-21 PROCEDURE — C9113 INJ PANTOPRAZOLE SODIUM, VIA: HCPCS | Performed by: INTERNAL MEDICINE

## 2021-09-21 PROCEDURE — 6360000002 HC RX W HCPCS: Performed by: INTERNAL MEDICINE

## 2021-09-21 PROCEDURE — 99233 SBSQ HOSP IP/OBS HIGH 50: CPT | Performed by: INTERNAL MEDICINE

## 2021-09-21 PROCEDURE — 6370000000 HC RX 637 (ALT 250 FOR IP): Performed by: INTERNAL MEDICINE

## 2021-09-21 PROCEDURE — 94640 AIRWAY INHALATION TREATMENT: CPT

## 2021-09-21 PROCEDURE — 2580000003 HC RX 258: Performed by: INTERNAL MEDICINE

## 2021-09-21 PROCEDURE — 80053 COMPREHEN METABOLIC PANEL: CPT

## 2021-09-21 PROCEDURE — 2060000000 HC ICU INTERMEDIATE R&B

## 2021-09-21 PROCEDURE — 80202 ASSAY OF VANCOMYCIN: CPT

## 2021-09-21 PROCEDURE — 2700000000 HC OXYGEN THERAPY PER DAY

## 2021-09-21 PROCEDURE — 94660 CPAP INITIATION&MGMT: CPT

## 2021-09-21 PROCEDURE — 99232 SBSQ HOSP IP/OBS MODERATE 35: CPT | Performed by: INTERNAL MEDICINE

## 2021-09-21 PROCEDURE — 94761 N-INVAS EAR/PLS OXIMETRY MLT: CPT

## 2021-09-21 RX ORDER — POTASSIUM CHLORIDE 20 MEQ/1
20 TABLET, EXTENDED RELEASE ORAL ONCE
Status: COMPLETED | OUTPATIENT
Start: 2021-09-21 | End: 2021-09-21

## 2021-09-21 RX ADMIN — MICONAZOLE NITRATE: 2 OINTMENT TOPICAL at 21:39

## 2021-09-21 RX ADMIN — IPRATROPIUM BROMIDE AND ALBUTEROL 1 PUFF: 20; 100 SPRAY, METERED RESPIRATORY (INHALATION) at 19:37

## 2021-09-21 RX ADMIN — IPRATROPIUM BROMIDE AND ALBUTEROL SULFATE 1 AMPULE: .5; 3 SOLUTION RESPIRATORY (INHALATION) at 10:59

## 2021-09-21 RX ADMIN — METOPROLOL TARTRATE 25 MG: 25 TABLET, FILM COATED ORAL at 21:38

## 2021-09-21 RX ADMIN — DOCUSATE SODIUM 100 MG: 100 CAPSULE, LIQUID FILLED ORAL at 08:29

## 2021-09-21 RX ADMIN — ATORVASTATIN CALCIUM 20 MG: 10 TABLET, FILM COATED ORAL at 21:38

## 2021-09-21 RX ADMIN — GUAIFENESIN 600 MG: 600 TABLET, EXTENDED RELEASE ORAL at 08:29

## 2021-09-21 RX ADMIN — CEFEPIME 2000 MG: 2 INJECTION, POWDER, FOR SOLUTION INTRAVENOUS at 10:34

## 2021-09-21 RX ADMIN — MICONAZOLE NITRATE: 2 OINTMENT TOPICAL at 08:29

## 2021-09-21 RX ADMIN — PANTOPRAZOLE SODIUM 40 MG: 40 INJECTION, POWDER, FOR SOLUTION INTRAVENOUS at 08:26

## 2021-09-21 RX ADMIN — SERTRALINE HYDROCHLORIDE 50 MG: 50 TABLET ORAL at 08:29

## 2021-09-21 RX ADMIN — GUAIFENESIN 600 MG: 600 TABLET, EXTENDED RELEASE ORAL at 21:38

## 2021-09-21 RX ADMIN — VANCOMYCIN HYDROCHLORIDE 500 MG: 500 INJECTION, POWDER, LYOPHILIZED, FOR SOLUTION INTRAVENOUS at 08:25

## 2021-09-21 RX ADMIN — CEFEPIME 2000 MG: 2 INJECTION, POWDER, FOR SOLUTION INTRAVENOUS at 21:56

## 2021-09-21 RX ADMIN — DEXTROSE AND SODIUM CHLORIDE: 5; 450 INJECTION, SOLUTION INTRAVENOUS at 05:59

## 2021-09-21 RX ADMIN — IPRATROPIUM BROMIDE AND ALBUTEROL SULFATE 1 AMPULE: .5; 3 SOLUTION RESPIRATORY (INHALATION) at 07:02

## 2021-09-21 RX ADMIN — ONDANSETRON HYDROCHLORIDE 4 MG: 2 INJECTION, SOLUTION INTRAMUSCULAR; INTRAVENOUS at 14:24

## 2021-09-21 RX ADMIN — METOPROLOL TARTRATE 25 MG: 25 TABLET, FILM COATED ORAL at 08:29

## 2021-09-21 RX ADMIN — ROFLUMILAST 250 MCG: 500 TABLET ORAL at 08:29

## 2021-09-21 RX ADMIN — ACETAMINOPHEN 650 MG: 325 TABLET ORAL at 21:47

## 2021-09-21 RX ADMIN — PREDNISONE 40 MG: 20 TABLET ORAL at 08:28

## 2021-09-21 RX ADMIN — SODIUM CHLORIDE, PRESERVATIVE FREE 10 ML: 5 INJECTION INTRAVENOUS at 21:38

## 2021-09-21 RX ADMIN — FERROUS SULFATE TAB 325 MG (65 MG ELEMENTAL FE) 325 MG: 325 (65 FE) TAB at 08:29

## 2021-09-21 RX ADMIN — POTASSIUM CHLORIDE 20 MEQ: 1500 TABLET, EXTENDED RELEASE ORAL at 10:34

## 2021-09-21 RX ADMIN — Medication 2 SPRAY: at 08:29

## 2021-09-21 RX ADMIN — ACETAMINOPHEN 650 MG: 325 TABLET ORAL at 11:30

## 2021-09-21 RX ADMIN — SODIUM CHLORIDE, PRESERVATIVE FREE 10 ML: 5 INJECTION INTRAVENOUS at 08:26

## 2021-09-21 RX ADMIN — IPRATROPIUM BROMIDE AND ALBUTEROL SULFATE 1 AMPULE: .5; 3 SOLUTION RESPIRATORY (INHALATION) at 23:23

## 2021-09-21 ASSESSMENT — PAIN SCALES - WONG BAKER
WONGBAKER_NUMERICALRESPONSE: 0

## 2021-09-21 ASSESSMENT — PAIN SCALES - GENERAL
PAINLEVEL_OUTOF10: 0
PAINLEVEL_OUTOF10: 5
PAINLEVEL_OUTOF10: 0
PAINLEVEL_OUTOF10: 5

## 2021-09-21 ASSESSMENT — PAIN DESCRIPTION - PAIN TYPE: TYPE: ACUTE PAIN

## 2021-09-21 NOTE — FLOWSHEET NOTE
09/21/21 1430   Vital Signs   Temp 96.5 °F (35.8 °C)   Temp Source Oral   Pulse 87   Heart Rate Source Monitor   Resp 17   /79   BP Location Right upper arm   Patient Position Semi fowlers   Level of Consciousness Alert (0)   MEWS Score 1   Patient Currently in Pain Denies   Pain Assessment   Pain Assessment 0-10   Pain Level 0   Burt-Baker Pain Rating 0     Afternoon vitals completed. Pt complaining of nausea; Zofran given at this time.     Heydi Kyle, RN

## 2021-09-21 NOTE — PROGRESS NOTES
Admit: 9/15/2021    Name:  Tarun Valles  Room:  /0247-82  MRN:    [id number]     Daily Progress Note for 9/21/2021       Presented from nursing home with acute respiratory distress and altered mental status. Placed on BiPAP  She had just been discharged to the nursing home from the hospital after being admitted for HCAP and acute respiratory failure. Daughter reports recent hx of CVA. Also recent admission to [facility] with severe COPD and PNA. hx of lower GI bleed, Hx of anal fissure bleed per daughter's report. Pt with admissions in June, August.   This is her third admission this month alone. Had goals of care discussed at [facility] facility last admission - with diagnosis of End stage COPD - pt and family insist on full code.              Interval History:     Ms Smith used bipap overnight   Feels good today and wishes to do PT  Now on 2 L oxygen   Feels improving slowly       Scheduled Meds:   vancomycin  500 mg IntraVENous Q24H    predniSONE  40 mg Oral Daily    fluticasone  2 spray Each Nostril Daily    oxymetazoline  2 spray Each Nostril BID    miconazole nitrate   Topical BID    atorvastatin  20 mg Oral Nightly    docusate sodium  100 mg Oral BID    ferrous sulfate  325 mg Oral Daily with breakfast    metoprolol tartrate  25 mg Oral BID    pantoprazole  40 mg IntraVENous Daily    Roflumilast  250 mcg Oral Daily    sertraline  50 mg Oral Daily    cefepime  2,000 mg IntraVENous Q12H    sodium chloride flush  5-40 mL IntraVENous 2 times per day    guaiFENesin  600 mg Oral BID    insulin lispro  0-6 Units SubCUTAneous TID WC    insulin lispro  0-3 Units SubCUTAneous Nightly    lidocaine 1 % injection  5 mL IntraDERmal Once    sodium chloride flush  5-40 mL IntraVENous 2 times per day    ipratropium-albuterol  1 ampule Inhalation Q4H While awake       Continuous Infusions:   sodium chloride      sodium chloride      sodium chloride      dextrose      sodium chloride 250 mL (21 0837)    dextrose 5 % and 0.45 % NaCl 100 mL/hr at 21 0559       PRN Meds:  sodium chloride, sodium chloride, bisacodyl, sodium chloride flush, sodium chloride, acetaminophen **OR** acetaminophen, albuterol, glucose, dextrose, glucagon (rDNA), dextrose, sodium chloride flush, sodium chloride, ondansetron                  Objective:     Temp  Av.5 °F (36.4 °C)  Min: 97 °F (36.1 °C)  Max: 98 °F (36.7 °C)  Pulse  Av.8  Min: 81  Max: 112  BP  Min: 117/73  Max: 147/86  SpO2  Av.7 %  Min: 96 %  Max: 100 %  FiO2   Av %  Min: 35 %  Max: 35 %  Patient Vitals for the past 4 hrs:   BP Temp Temp src Pulse Resp SpO2 Weight   21 0703 -- -- -- -- 18 100 % --   21 0343 131/73 97 °F (36.1 °C) Axillary 81 22 100 % 110 lb 6.4 oz (50.1 kg)         Intake/Output Summary (Last 24 hours) at 2021 0736  Last data filed at 2021 7044  Gross per 24 hour   Intake 360 ml   Output 2500 ml   Net -2140 ml       Physical Exam:      General:  Elderly female up in bed,  Awake, alert and oriented. Appears to be not in any distress  Mucous Membranes:  Pink , anicteric  Neck: No JVD, no carotid bruit, no thyromegaly  Chest:  Improving justa air entry ,no  accessory muscle use  Occasional wheeze   Cardiovascular:  RRR S1S2 heard, no murmurs or gallops  Abdomen:  Soft, undistended, non tender, no organomegaly, BS present  Extremities: scattered ecchymoses  No edema or cyanosis.  Distal pulses well felt  Neurological : grossly normal           Lab Data:  CBC:   Recent Labs     21  0523 21  0435 21  0605   WBC 7.0 8.1 10.1   RBC 3.52* 3.72* 3.94*   HGB 9.7* 10.2* 10.9*   HCT 30.5* 32.3* 34.8*   MCV 86.6 86.8 88.3   RDW 20.3* 19.7* 20.1*    214 222     BMP:   Recent Labs     21  0523 21  0559 21  0605    140 140   K 3.5 3.3* 3.3*    106 107   CO2 27 25 25   BUN 14 10 10   CREATININE 0.8 0.7 0.9     CARDIAC ENZYMES:   No results for input(s): CKMB, CKMBINDEX, TROPONINI in the last 72 hours. Invalid input(s): CKTOTAL;3  FASTING LIPID PANEL:  Lab Results   Component Value Date    CHOL 146 11/14/2019    HDL 63 11/14/2019    TRIG 110 11/14/2019     LIVER PROFILE:   Recent Labs     09/19/21  0523 09/20/21  0559 09/21/21  0605   AST 11* 11* 14*   ALT 6* 6* 8*   BILITOT <0.2 <0.2 <0.2   ALKPHOS 60 64 69         IR MIDLINE CATH   Final Result   Successful placement of midline catheter. XR CHEST PORTABLE   Final Result   Right IJ catheter with tip in the SVC. Small bilateral pleural effusions. Mild pulmonary edema. XR CHEST PORTABLE   Final Result   Mild-to-moderate pulmonary vascular congestion worse since yesterday. Mild   left retrocardiac atelectasis or less likely pneumonia. Mild-to-moderate   underlying bullous changes. XR CHEST PORTABLE   Final Result   In this patient with probable changes of COPD, there are stable diffuse   reticular opacities which may represent mild pulmonary edema. Underlying   pneumonitis is not excluded. VL Extremity Venous Right   Final Result      XR CHEST PORTABLE   Final Result   No significant early change in diffuse bilateral opacity which can reflect   pulmonary edema or atypical infection. XR CHEST PORTABLE   Final Result   Status post right IJ catheter placement in good position with no pneumothorax. Slowly resolving pulmonary congestion. Chronic obstructive lung changes with slowly resolving perihilar and   bibasilar opacities. CT HEAD WO CONTRAST   Final Result   No acute intracranial abnormality. Chronic and age related changes including age-appropriate cerebral volume   loss and scattered nonspecific white matter hypodensities which are likely   the sequela of chronic small vessel ischemic disease. Partial opacification of the bilateral middle ear cavities which could   indicate otitis media.       Small air-fluid levels are noted in the bilateral maxillary and sphenoid   sinuses, correlate clinically for evidence of acute sinusitis. XR CHEST PORTABLE   Final Result   Findings compatible with pulmonary interstitial edema. Given the normal   heart size, consider both cardiogenic and noncardiogenic etiologies,   including atypical infections. EKG-  Sinus rhythm with Premature supraventricular complexesWhen     Assessment & Plan:         Acute on chronic hypoxic and hypercarbic respiratory failure  With end-stage COPD with acute exacerbation  3rd admission for same this month alone. BiPAP PRN. Currently on 2 L of oxygen. Continue IV Solu-Medrol  Continue IV cefepime and vancomycin day 6/14 for HCAP  Mucinex  Daliresp  Pulmonary consulted       Healthcare associate pneumonia  Elevated procalcitonin  Continue IV cefepime and vancomycin for presumable gram-negative and/are MRSA pneumonia. Sputum cultures pending  Improving resp status slowly   Given recurrent admissions, pulm recommends 14 days of cefepime and vanc       Metabolic encephalopathy - sec to hypercarbia   Resolved       Acute on Chronic anemia  Hx of lower GI bleed. pRBC 2 units tx 9/18  Hgb up to 10.9 and no signs of active bleeding. I am concerned she is not a candidate for endoscopy with end stage COPD. Monitor Hgb        CAD/HTN- continued home BB,   Clopidogrel stopped due to worsening anemia. atorvastatin  Mildly elevated troponin likely type II demand based ischemia       Mood disorder continued home sertraline       GERD- continued home PPI      Right upper extremity swelling. Obtain venous Doppler   + Rt superficial venous thrombus. Not a candidate for [facility] if this were to progress - due to severe anemia. DVT Prophylaxis: SCD  Diet: Dysphagia. Code Status: Full Code     PT/OT Eval Status:p     Dispo - cc. Very poor prognosis. pt and family insisted on full code.     pt refusing SNF, might be an issue at [place] as she lives

## 2021-09-21 NOTE — PROGRESS NOTES
Occupational/Physical Therapy  Orders received, chart reviewed. Patient sleeping soundly upon arrival, family reports she just fell asleep 10 min ago. Will hold at this time to protect rest and reattempt at a later date/time.   Evens Zuniga OTR/L [id number]  Aime Hobbs, MS PT, # [id number]

## 2021-09-21 NOTE — PROGRESS NOTES
09/20/21 2312   NIV Type   Skin Protection for O2 Device Yes   NIV Started/Stopped On   Equipment Type v60   Mode Bilevel   Mask Type Full face mask   Mask Size Medium   Settings/Measurements   IPAP 20 cmH20   CPAP/EPAP 5 cmH2O   Rate Ordered 18   Resp 26   Insp Rise Time (%) 0.9 %   FiO2  35 %   Vt Exhaled 538 mL   Minute Volume 14.5 Liters   Mask Leak (lpm) 1 lpm   Comfort Level Good   Using Accessory Muscles No   SpO2 98   Alarm Settings   Alarms On Y   Press Low Alarm 16 cmH2O   High Pressure Alarm 35 cmH2O   Delay Alarm 20 sec(s)   Resp Rate Low Alarm 16   High Respiratory Rate 40 br/min

## 2021-09-21 NOTE — FLOWSHEET NOTE
09/21/21 0800   Vital Signs   Temp 98.2 °F (36.8 °C)   Temp Source Oral   Pulse 90   Heart Rate Source Monitor   Resp 19   /75   BP Location Right upper arm   Patient Position Semi fowlers   Level of Consciousness Alert (0)   MEWS Score 1   Patient Currently in Pain Denies   Pain Assessment   Pain Assessment 0-10   Pain Level 0   Burt-Munoz Pain Rating 0   Pain Type Acute pain   Oxygen Therapy   SpO2 100 %   O2 Device Nasal cannula   O2 Flow Rate (L/min) 2 L/min       Pt assessment complete; see flow sheets. Vitals completed. meds given per [facility]. Pt sat up for breakfast. Pt denies any other care needs at this time. Pt stable at this time.     Stone Bocanegra RN

## 2021-09-21 NOTE — PROGRESS NOTES
Shift assessment complete, see flowsheets. Medications given, see MAR. Positioned for comfort. Snack provided. Midline flushed. RIJ removal dressing CDI. Midline dressing CDI. IVF. No other needs or discomforts stated at this time. Call light in easy reach, bedside table in easy reach, and bed in lowest position.   Asa Godinez RN

## 2021-09-21 NOTE — PROGRESS NOTES
Bedside report and transfer of care given to [facility]. Pt currently resting in bed with the call light within reach. Pt denies any other care needs at this time. Pt stable at this time.       Buena Hammans, RN

## 2021-09-21 NOTE — PROGRESS NOTES
09/21/21 0330   NIV Type   NIV Started/Stopped On   Equipment Type v60   Mode Bilevel   Mask Type Full face mask   Mask Size Medium   Settings/Measurements   IPAP 20 cmH20   CPAP/EPAP 5 cmH2O   Rate Ordered 18   Resp 22   Insp Rise Time (%) 0.9 %   FiO2  35 %   I Time/ I Time % 0.9 s   Vt Exhaled 478 mL   Minute Volume 9.5 Liters   Mask Leak (lpm) 21 lpm   Comfort Level Good   Using Accessory Muscles No   SpO2 97

## 2021-09-21 NOTE — PROGRESS NOTES
Physician Progress Note      PATIENT:               Jessica Edwards  CSN #:                  [id number]  :                       1940  ADMIT DATE:       9/15/2021 5:28 PM  [address] [place] DATE:  RESPONDING  PROVIDER #:        Carlita Stanford MD          QUERY TEXT:    Pt admitted with acute respiratory failure . Noted documentation of   Hypotensive shock/septic shock per pulmonology consult. If possible, please   document in progress notes and discharge summary:    The medical record reflects the following:  Risk Factors: acute respiratory failure, pneumonia  Clinical Indicators: HR up to 101, RR up to 42, WBC 11.3, lactic 1.3,   procalcitonin1.68  Treatment: Bipap, cefepime, vancomycin, monitor labs, monitor vitals    Thank You Stephanie Sales RN, [email or web address]  Options provided:  -- Sepsis confirmed present on admission  -- Sepsis ruled out  -- Other - I will add my own diagnosis  -- Disagree - Not applicable / Not valid  -- Disagree - Clinically unable to determine / Unknown  -- Refer to Clinical Documentation Reviewer    PROVIDER RESPONSE TEXT:    The diagnosis of sepsis was ruled out.     Query created by: Carter Gaffney on 2021 1:45 PM      Electronically signed by:  Carlita Stanford MD 2021 7:36 AM

## 2021-09-21 NOTE — PROGRESS NOTES
Inpatient Physical Therapy Evaluation and Treatment    Unit: PCU  Date:  9/22/2021  Patient Name:    Ash White  Admitting diagnosis:  Acute respiratory failure with hypoxia [facility]) [J96.01]  Acute respiratory failure with hypoxia and hypercapnia (HCC) [J96.01, J96.02]  Pneumonia due to infectious organism, unspecified laterality, unspecified part of lung [J18.9]  Admit Date:  9/15/2021  Precautions/Restrictions/WB Status/ Lines/ Wounds/ Oxygen: Fall risk, Lines -IV, Supplemental O2 (2.5L/min) and Bill catheter, [place] (hard of hearing) and Telemetry    Treatment Time:  0900 - 0950  Treatment Number:  1   Timed Code Treatment Minutes: 40 minutes  Total Treatment Minutes:  50  minutes    Patient Goals for Therapy: \" Go home \"          Discharge Recommendations: SNF (patient and daughter refuses SNF hence will need home 24hr assist with home PT)  DME needs for discharge: RW       Therapy recommendation for EMS Transport: can transport by wheelchair    Therapy recommendations for staff:   Assist of 1 with use of rolling walker (RW) and gait belt for all transfers and ambulation to/from [facility]  to/from chair. Patient has increased anxiety with functional mobility hence refusing to get on [facility] and wants to continue to using bedpan. History of Present Illness: H & P as per Martha Denton MD's note dated 9/16/2021  [de-identified] y.o. female who presented to ER today with acute shortness of breath and change in mental status. Saturation was reported 58% at the extended care facility. Was just recently discharged to extended care facility from [facility]. 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 Remains full code at this time POA is patient's sister. Patient's niece is at bedside  Patient is somewhat lethargic with BiPAP on her.     Home Health S4 Level Recommendation:  Level 3 Safety  AM-PAC Mobility Score    AM-PAC Inpatient Mobility Raw Score : 16  AM-PAC Inpatient Mobility without Stair Climbing Raw Score : 14    Preadmission Environment    Pt. Lives with family (Son who works just across the street )  Home environment:  one story home  Steps to enter first floor: 1 steps to enter and no handrail  Steps to second floor: N/A  Bathroom: tub/shower unit, and standard height commode,tub transfer bench  Equipment owned: SW, wc (manual), lift chair, hospital bed  (doesn't go up and down), home O2 (2L) continous and lifealert    Preadmission Status:  Pt. Able to drive: No  Pt Fully independent with ADLs: No  Pt. Required assistance from family for: Cleaning, Cooking and Laundry   Pt. independent for transfers and gait and walked with No Device  History of falls No    Pain   No    Cognition    A&O x4   Able to follow 2 step commands    Subjective  Patient lying supine in bed with daughter present. Pt agreeable to this PT eval & tx. Upper Extremity ROM/Strength  Please see OT evaluation.       Lower Extremity ROM / Strength   AROM WFL: Yes  ROM limitations: N/A    Strength Assessment (measured on a 0-5 scale):  R LE   Quad   4-   Ant Tib  4-   Hamstring 3+   Iliopsoas 3+  L LE  Quad   4-   Ant Tib  4-   Hamstring 3+   Iliopsoas 3+    Lower Extremity Sensation    WFL    Lower Extremity Proprioception:   WFL    Coordination and Tone  WFL    Balance  Sitting:  Fair +; SBA  Comments: ~15 min    Standing: Fair -; CGA  Comments: ~1 min    Bed Mobility   Supine to Sit:    SBA  Sit to Supine:   Not Tested  Rolling:   Not Tested  Scooting in sitting: SBA  Scooting in supine:  Not Tested    Transfer Training     Sit to stand:   CGA  Stand to sit:   CGA  Bed to Chair:   CGA with use of gait belt and rolling walker (RW)    Gait gait completed as indicated below  Distance:      3 ft (bed to chair)  Deviations (firm surface/linoleum):  decreased elbert, increased ADELINA, decreased step length bilaterally and decreased stance time bilaterally  Assistive Device Used:    gait belt and rolling walker (RW)  Level of Assist:    CGA  Comment: Patient was limited in walking distance due to increased anxiety and fatigue. Stair Training deferred, pt unsafe/ not appropriate to complete stairs at this time     Activity Tolerance   Pt completed therapy session with increased anxiety and SOB noted with all functional mobility  SpO2: 96% on 2.5L  HR: 109  BP: 159/97 at rest    Positioning Needs   Pt up in chair, alarm set, positioned in proper neutral alignment and pressure relief provided. Call light provided and all needs within reach    Exercises Initiated  all completed bilaterally unless indicated  pursed lip breathing exercises to relieve SOB and anxiety (3-5x) during functional mobility. Other  None. Patient/Family Education   Pt educated on role of inpatient PT, POC, importance of continued activity, DC recommendations, safety awareness, transfer techniques, pursed lip breathing, energy conservation, pacing activity and calling for assist with mobility. Assessment  Pt seen for Physical Therapy evaluation in acute care setting. Pt demonstrated decreased Activity tolerance, Balance, Safety and Strength as well as decreased independence with Ambulation, Bed Mobility  and Transfers. Recommending SNF upon discharge as patient functioning well below baseline, demonstrates good rehab potential and unable to return home due to limited or no family support, inability to negotiate stairs to enter home/bedroom/bathroom, burden of care beyond caregiver ability, home environment not conducive to patient recovery and limited safety awareness. Patient and daughter currently refusing SNF due to patient's increased anxiety with unfamiliar place, people and routine. Patient's gets anxious easily during functional mobility and need increased time to complete the given activities. Daughter and patient understand and agreed for home with 24hr assist with home PT. Goals : To be met in 3 visits:  1). Independent with LE Ex x 10 reps    To be met in 6 visits:  1). Supine to/from sit: Supervision  2). Sit to/from stand: Supervision  3). Bed to chair: SBA  4). Gait: Ambulate 10 ft.   with  SBA and use of LRAD (least restrictive assistive device)  5). Tolerate B LE exercises 3 sets of 10-15 reps  6). Ascend/descend 1 steps with SBA with use of no handrail and LRAD (least restrictive assistive device)    Rehabilitation Potential: Fair  Strengths for achieving goals include:   PLOF and Family Support   Barriers to achieving goals include:    Complexity of condition and Weakness    Plan    To be seen 3-5 x / week  while in acute care setting for therapeutic exercises, bed mobility, transfers, progressive gait training, balance training, and family/patient education. Signature: Efrain Xavier MS PT, # [id number]    If patient discharges from this facility prior to next visit, this note will serve as the Discharge Summary.

## 2021-09-21 NOTE — PROGRESS NOTES
Palliative Care Initial Note  Palliative Care Admit date: 09/21/21    Advance Directives: Full Code    Plan of care/goals: home with hospice    Social/Spiritual: Prayer Support    Plan: Reviewed chart. Pt end stage COPD, admitted to the hospital three times in the last month. Writer spoke with pt's daughter,Juan, via TC and she requests referral to [address] [place] [address] for home with hospice, however, she does want pt to remain full code at this time. Referral called to [address] [place] [address] and fs sent. Awaiting meeting time. Following. [id number] spoke with Keerthi Shaw from [address] [place] [address] and she states meeting planned for 11 AM with pt's family tomorrow. Following.     Reason for consult:    ___ Advance Care Planning  _x__ Transition of Care Planning  ___ Psychosocial/Spiritual Support  ___ Symptom Management    Stephanie Kiser RN Palliative Care

## 2021-09-21 NOTE — PROGRESS NOTES
Pulmonary Progress Note  CC: Shortness of breath, altered mental status    Subjective:    Feels pretty good. IV line:   9/20/21 R midline     EXAM:   /75   Pulse 90   Temp 98.2 °F (36.8 °C) (Oral)   Resp 19   Ht 5' 2\" (1.575 m)   Wt 110 lb 6.4 oz (50.1 kg)   SpO2 100%   BMI 20.19 kg/m²  on 2 L  General: NAD, looking better   Eyes: PERRL. No sclera icterus. No conjunctival injection. ENT: No discharge. Pharynx clear. Neck: Trachea midline. Normal thyroid. Right IJ in place. Resp: Tachypnea with minimal accessory muscle use. No crackles. + mild wheezing. + rhonchi. No dullness on percussion. CV: Regular rate. Regular rhythm. No mumur or rub. No edema. Right UE edema. GI: Non-tender. Non-distended. Normal bowel sounds. No hernia. Skin: Warm and dry. No nodule on exposed extremities. No rash on exposed extremities. Bilateral arms weeping. Lymph: No cervical LAD. No supraclavicular LAD. M/S: No cyanosis. No joint deformity. No clubbing. Neuro: Awake. Conversant   Psych: Oriented to person, place, time. + anxiety. No agitation.      Scheduled Meds:   vancomycin  500 mg IntraVENous Q24H    predniSONE  40 mg Oral Daily    fluticasone  2 spray Each Nostril Daily    oxymetazoline  2 spray Each Nostril BID    miconazole nitrate   Topical BID    atorvastatin  20 mg Oral Nightly    docusate sodium  100 mg Oral BID    ferrous sulfate  325 mg Oral Daily with breakfast    metoprolol tartrate  25 mg Oral BID    pantoprazole  40 mg IntraVENous Daily    Roflumilast  250 mcg Oral Daily    sertraline  50 mg Oral Daily    cefepime  2,000 mg IntraVENous Q12H    sodium chloride flush  5-40 mL IntraVENous 2 times per day    guaiFENesin  600 mg Oral BID    insulin lispro  0-6 Units SubCUTAneous TID     insulin lispro  0-3 Units SubCUTAneous Nightly    lidocaine 1 % injection  5 mL IntraDERmal Once    sodium chloride flush  5-40 mL IntraVENous 2 times per day    ipratropium-albuterol  1 ampule Inhalation Q4H While awake     Continuous Infusions:   sodium chloride      sodium chloride      sodium chloride      dextrose      sodium chloride 250 mL (09/18/21 0837)    dextrose 5 % and 0.45 % NaCl 100 mL/hr at 09/21/21 0559     PRN Meds:  sodium chloride, sodium chloride, bisacodyl, sodium chloride flush, sodium chloride, acetaminophen **OR** acetaminophen, albuterol, glucose, dextrose, glucagon (rDNA), dextrose, sodium chloride flush, sodium chloride, ondansetron    Labs:  CBC:   Recent Labs     09/19/21  0523 09/20/21  0435 09/21/21  0605   WBC 7.0 8.1 10.1   HGB 9.7* 10.2* 10.9*   HCT 30.5* 32.3* 34.8*   MCV 86.6 86.8 88.3    214 222     BMP:   Recent Labs     09/19/21  0523 09/20/21  0559 09/21/21  0605    140 140   K 3.5 3.3* 3.3*    106 107   CO2 27 25 25   BUN 14 10 10   CREATININE 0.8 0.7 0.9     Cultures:  9/16/2021 SARS-CoV-2  negative  9/16/2021 influenza A & B negative  9/15/2021 blood cultures NGTD  9/15/2021 urine culture negative    Imaging:  Head CT 9/15/2021  No acute intracranial abnormality  Partial opacification of the bilateral middle ear cavities which could  indicate otitis media. Small air-fluid levels are noted in the bilateral maxillary and sphenoid  sinuses, correlate clinically for evidence of acute sinusitis. UE Doppler 9/17/2021  Evidence of acute totally occluding superficial venous thrombosis involving the right basilic vein at the antecubital fossa. CXR 9/20/2021  Unchanged, CVC in place    ASSESSMENT:  · Acute on chronic respiratory failure with hypoxemia & refractory hypercapnia; life-threatening respiratory failure.   · Recently d/c'd on home BiPAP/AVAPS after seeing Agatha Quinones  · Acute COPD exacerbation  · Healthcare acquired pneumonia, resolving opacities, procal downtrending  · Hypotensive/septic shock, initially fluid responsive - resolved  · Mental status change  · Right upper extremity superficial thrombus 9/17/21  · Persistently elevated troponin, 0.05 to 0.07   · Acute on chronic anemia, s/p 2 units 9/18/21  · CAD  · S/p COVID-19 vaccination    PLAN:  · NIPPV HS and PRN work of breathing   · Antibiotics: Cefepime &Vancomycin D#6/14; monitoring of vanc levels to prevent toxicity.      · Transfused 2 units of blood with lasix between units 9/18/21  · Prednisone taper   · Right IJ CVC 9/16/21 is out    · Full code  · Discharge planning okay with me

## 2021-09-22 LAB
A/G RATIO: 1.1 (ref 1.1–2.2)
ALBUMIN SERPL-MCNC: 2.9 G/DL (ref 3.4–5)
ALP BLD-CCNC: 70 U/L (ref 40–129)
ALT SERPL-CCNC: 6 U/L (ref 10–40)
ANION GAP SERPL CALCULATED.3IONS-SCNC: 8 MMOL/L (ref 3–16)
AST SERPL-CCNC: 9 U/L (ref 15–37)
BASOPHILS ABSOLUTE: 0 K/UL (ref 0–0.2)
BASOPHILS RELATIVE PERCENT: 0.1 %
BILIRUB SERPL-MCNC: 0.3 MG/DL (ref 0–1)
BUN BLDV-MCNC: 12 MG/DL (ref 7–20)
CALCIUM SERPL-MCNC: 8.7 MG/DL (ref 8.3–10.6)
CHLORIDE BLD-SCNC: 109 MMOL/L (ref 99–110)
CO2: 26 MMOL/L (ref 21–32)
CREAT SERPL-MCNC: 0.7 MG/DL (ref 0.6–1.2)
EOSINOPHILS ABSOLUTE: 0.1 K/UL (ref 0–0.6)
EOSINOPHILS RELATIVE PERCENT: 0.7 %
GFR AFRICAN AMERICAN: >60
GFR NON-AFRICAN AMERICAN: >60
GLOBULIN: 2.6 G/DL
GLUCOSE BLD-MCNC: 105 MG/DL (ref 70–99)
GLUCOSE BLD-MCNC: 132 MG/DL (ref 70–99)
GLUCOSE BLD-MCNC: 156 MG/DL (ref 70–99)
GLUCOSE BLD-MCNC: 88 MG/DL (ref 70–99)
GLUCOSE BLD-MCNC: 91 MG/DL (ref 70–99)
HCT VFR BLD CALC: 34.6 % (ref 36–48)
HEMOGLOBIN: 11 G/DL (ref 12–16)
LYMPHOCYTES ABSOLUTE: 1.1 K/UL (ref 1–5.1)
LYMPHOCYTES RELATIVE PERCENT: 13.7 %
MCH RBC QN AUTO: 27.9 PG (ref 26–34)
MCHC RBC AUTO-ENTMCNC: 31.8 G/DL (ref 31–36)
MCV RBC AUTO: 87.8 FL (ref 80–100)
MONOCYTES ABSOLUTE: 0.6 K/UL (ref 0–1.3)
MONOCYTES RELATIVE PERCENT: 7.1 %
NEUTROPHILS ABSOLUTE: 6.5 K/UL (ref 1.7–7.7)
NEUTROPHILS RELATIVE PERCENT: 78.4 %
PDW BLD-RTO: 20.2 % (ref 12.4–15.4)
PERFORMED ON: ABNORMAL
PERFORMED ON: NORMAL
PLATELET # BLD: 215 K/UL (ref 135–450)
PMV BLD AUTO: 8.2 FL (ref 5–10.5)
POTASSIUM SERPL-SCNC: 3.4 MMOL/L (ref 3.5–5.1)
RBC # BLD: 3.95 M/UL (ref 4–5.2)
SODIUM BLD-SCNC: 143 MMOL/L (ref 136–145)
TOTAL PROTEIN: 5.5 G/DL (ref 6.4–8.2)
WBC # BLD: 8.2 K/UL (ref 4–11)

## 2021-09-22 PROCEDURE — 99233 SBSQ HOSP IP/OBS HIGH 50: CPT | Performed by: INTERNAL MEDICINE

## 2021-09-22 PROCEDURE — 97116 GAIT TRAINING THERAPY: CPT

## 2021-09-22 PROCEDURE — 97110 THERAPEUTIC EXERCISES: CPT

## 2021-09-22 PROCEDURE — 2060000000 HC ICU INTERMEDIATE R&B

## 2021-09-22 PROCEDURE — 2580000003 HC RX 258: Performed by: INTERNAL MEDICINE

## 2021-09-22 PROCEDURE — 97161 PT EVAL LOW COMPLEX 20 MIN: CPT

## 2021-09-22 PROCEDURE — 6370000000 HC RX 637 (ALT 250 FOR IP): Performed by: INTERNAL MEDICINE

## 2021-09-22 PROCEDURE — 85025 COMPLETE CBC W/AUTO DIFF WBC: CPT

## 2021-09-22 PROCEDURE — 97530 THERAPEUTIC ACTIVITIES: CPT

## 2021-09-22 PROCEDURE — 6360000002 HC RX W HCPCS: Performed by: INTERNAL MEDICINE

## 2021-09-22 PROCEDURE — 97165 OT EVAL LOW COMPLEX 30 MIN: CPT

## 2021-09-22 PROCEDURE — 2700000000 HC OXYGEN THERAPY PER DAY

## 2021-09-22 PROCEDURE — 36415 COLL VENOUS BLD VENIPUNCTURE: CPT

## 2021-09-22 PROCEDURE — C9113 INJ PANTOPRAZOLE SODIUM, VIA: HCPCS | Performed by: INTERNAL MEDICINE

## 2021-09-22 PROCEDURE — 94660 CPAP INITIATION&MGMT: CPT

## 2021-09-22 PROCEDURE — 80053 COMPREHEN METABOLIC PANEL: CPT

## 2021-09-22 PROCEDURE — 94640 AIRWAY INHALATION TREATMENT: CPT

## 2021-09-22 PROCEDURE — 6370000000 HC RX 637 (ALT 250 FOR IP): Performed by: HOSPITALIST

## 2021-09-22 PROCEDURE — 94761 N-INVAS EAR/PLS OXIMETRY MLT: CPT

## 2021-09-22 RX ADMIN — SODIUM CHLORIDE 25 ML: 9 INJECTION, SOLUTION INTRAVENOUS at 10:27

## 2021-09-22 RX ADMIN — IPRATROPIUM BROMIDE AND ALBUTEROL SULFATE 1 AMPULE: .5; 3 SOLUTION RESPIRATORY (INHALATION) at 19:35

## 2021-09-22 RX ADMIN — CEFEPIME 2000 MG: 2 INJECTION, POWDER, FOR SOLUTION INTRAVENOUS at 10:28

## 2021-09-22 RX ADMIN — FERROUS SULFATE TAB 325 MG (65 MG ELEMENTAL FE) 325 MG: 325 (65 FE) TAB at 10:33

## 2021-09-22 RX ADMIN — ATORVASTATIN CALCIUM 20 MG: 10 TABLET, FILM COATED ORAL at 20:32

## 2021-09-22 RX ADMIN — VANCOMYCIN HYDROCHLORIDE 500 MG: 500 INJECTION, POWDER, LYOPHILIZED, FOR SOLUTION INTRAVENOUS at 12:09

## 2021-09-22 RX ADMIN — IPRATROPIUM BROMIDE AND ALBUTEROL SULFATE 1 AMPULE: .5; 3 SOLUTION RESPIRATORY (INHALATION) at 15:39

## 2021-09-22 RX ADMIN — DOCUSATE SODIUM 100 MG: 100 CAPSULE, LIQUID FILLED ORAL at 10:30

## 2021-09-22 RX ADMIN — PREDNISONE 40 MG: 20 TABLET ORAL at 10:29

## 2021-09-22 RX ADMIN — DOCUSATE SODIUM 100 MG: 100 CAPSULE, LIQUID FILLED ORAL at 20:32

## 2021-09-22 RX ADMIN — IPRATROPIUM BROMIDE AND ALBUTEROL SULFATE 1 AMPULE: .5; 3 SOLUTION RESPIRATORY (INHALATION) at 11:29

## 2021-09-22 RX ADMIN — ROFLUMILAST 250 MCG: 500 TABLET ORAL at 10:29

## 2021-09-22 RX ADMIN — METOPROLOL TARTRATE 25 MG: 25 TABLET, FILM COATED ORAL at 10:30

## 2021-09-22 RX ADMIN — METOPROLOL TARTRATE 25 MG: 25 TABLET, FILM COATED ORAL at 20:32

## 2021-09-22 RX ADMIN — FLUTICASONE PROPIONATE 2 SPRAY: 50 SPRAY, METERED NASAL at 10:31

## 2021-09-22 RX ADMIN — SODIUM CHLORIDE, PRESERVATIVE FREE 10 ML: 5 INJECTION INTRAVENOUS at 21:00

## 2021-09-22 RX ADMIN — SODIUM CHLORIDE, PRESERVATIVE FREE 10 ML: 5 INJECTION INTRAVENOUS at 10:30

## 2021-09-22 RX ADMIN — MICONAZOLE NITRATE: 2 OINTMENT TOPICAL at 10:30

## 2021-09-22 RX ADMIN — GUAIFENESIN 600 MG: 600 TABLET, EXTENDED RELEASE ORAL at 20:32

## 2021-09-22 RX ADMIN — CEFEPIME 2000 MG: 2 INJECTION, POWDER, FOR SOLUTION INTRAVENOUS at 20:36

## 2021-09-22 RX ADMIN — GUAIFENESIN 600 MG: 600 TABLET, EXTENDED RELEASE ORAL at 10:30

## 2021-09-22 RX ADMIN — SERTRALINE HYDROCHLORIDE 50 MG: 50 TABLET ORAL at 10:30

## 2021-09-22 RX ADMIN — MICONAZOLE NITRATE: 2 OINTMENT TOPICAL at 21:00

## 2021-09-22 RX ADMIN — PANTOPRAZOLE SODIUM 40 MG: 40 INJECTION, POWDER, FOR SOLUTION INTRAVENOUS at 10:31

## 2021-09-22 ASSESSMENT — PAIN SCALES - GENERAL
PAINLEVEL_OUTOF10: 0
PAINLEVEL_OUTOF10: 0

## 2021-09-22 NOTE — PROGRESS NOTES
Pulmonary Progress Note  CC: Shortness of breath, altered mental status    Subjective:    Feels pretty good. Anxiety continues, worsening her sob & visa versa   BiPAP helps    IV line:   9/20/21 R midline     EXAM:   /80   Pulse 88   Temp 98.1 °F (36.7 °C) (Oral)   Resp 18   Ht 5' 2\" (1.575 m)   Wt 110 lb 6 oz (50.1 kg)   SpO2 98%   BMI 20.19 kg/m²  on 2 L  General: NAD, looking better   Eyes: PERRL. No sclera icterus. No conjunctival injection. ENT: No discharge. Pharynx clear. Neck: Trachea midline. Normal thyroid. Right IJ in place. Resp: Tachypnea with minimal accessory muscle use. No crackles. + mild wheezing. + rhonchi. No dullness on percussion. CV: Regular rate. Regular rhythm. No mumur or rub. No edema. Right UE edema. GI: Non-tender. Non-distended. Normal bowel sounds. No hernia. Skin: Warm and dry. No nodule on exposed extremities. No rash on exposed extremities. Bilateral arms weeping. Lymph: No cervical LAD. No supraclavicular LAD. M/S: No cyanosis. No joint deformity. No clubbing. Neuro: Awake. Conversant   Psych: Oriented to person, place, time. + anxiety. No agitation.      Scheduled Meds:   vancomycin  500 mg IntraVENous Q24H    predniSONE  40 mg Oral Daily    fluticasone  2 spray Each Nostril Daily    miconazole nitrate   Topical BID    atorvastatin  20 mg Oral Nightly    docusate sodium  100 mg Oral BID    ferrous sulfate  325 mg Oral Daily with breakfast    metoprolol tartrate  25 mg Oral BID    pantoprazole  40 mg IntraVENous Daily    Roflumilast  250 mcg Oral Daily    sertraline  50 mg Oral Daily    cefepime  2,000 mg IntraVENous Q12H    sodium chloride flush  5-40 mL IntraVENous 2 times per day    guaiFENesin  600 mg Oral BID    insulin lispro  0-6 Units SubCUTAneous TID     insulin lispro  0-3 Units SubCUTAneous Nightly    sodium chloride flush  5-40 mL IntraVENous 2 times per day    ipratropium-albuterol  1 ampule Inhalation Q4H While awake Continuous Infusions:   sodium chloride      sodium chloride      sodium chloride      dextrose      sodium chloride 250 mL (09/18/21 0837)     PRN Meds:  albuterol-ipratropium, sodium chloride, sodium chloride, bisacodyl, sodium chloride flush, sodium chloride, acetaminophen **OR** acetaminophen, albuterol, glucose, dextrose, glucagon (rDNA), dextrose, sodium chloride flush, sodium chloride, ondansetron    Labs:  CBC:   Recent Labs     09/20/21  0435 09/21/21  0605 09/22/21  0734   WBC 8.1 10.1 8.2   HGB 10.2* 10.9* 11.0*   HCT 32.3* 34.8* 34.6*   MCV 86.8 88.3 87.8    222 215     BMP:   Recent Labs     09/20/21  0559 09/21/21  0605    140   K 3.3* 3.3*    107   CO2 25 25   BUN 10 10   CREATININE 0.7 0.9     Cultures:  9/16/2021 SARS-CoV-2  negative  9/16/2021 influenza A & B negative  9/15/2021 blood cultures NGTD  9/15/2021 urine culture negative    Imaging:  Head CT 9/15/2021  No acute intracranial abnormality  Partial opacification of the bilateral middle ear cavities which could  indicate otitis media. Small air-fluid levels are noted in the bilateral maxillary and sphenoid  sinuses, correlate clinically for evidence of acute sinusitis. UE Doppler 9/17/2021  Evidence of acute totally occluding superficial venous thrombosis involving the right basilic vein at the antecubital fossa.     CXR 9/20/2021  Unchanged, CVC in place    ASSESSMENT:  · Acute on chronic respiratory failure with hypoxemia & refractory hypercapnia; life-threatening respiratory failure, resolved  · Recently d/c'd on home BiPAP/AVAPS after seeing Kelly Appiah  · Acute COPD exacerbation, resolved  · Healthcare acquired pneumonia, resolved  · Hypotensive/septic shock, initially fluid responsive - resolved  · Mental status change, resolved  · Right upper extremity superficial thrombus 9/17/21  · Persistently elevated troponin, 0.05 to 0.07   · Acute on chronic anemia, s/p 2 units 9/18/21  · CAD  · S/p COVID-19 vaccination    PLAN:  · NIPPV HS and PRN work of breathing   · Antibiotics: Cefepime & Vancomycin D#7/14; monitoring of vanc levels to prevent toxicity. · Transfused 2 units of blood with lasix between units 9/18/21  · Prednisone taper   · Right IJ CVC 9/16/21 is out    · Full code  · Discharge planning okay with me. Will need non-invasive positive pressure ventilation for life threatening respiratory failure. Patient has demonstrated life threatening respiratory failure with hypercapnia that is recurrent when he does not have a respiratory assist device. Patient will require an Astral NIV based upon her severe lung disease demonstrated by her recurrent hypercapnic respiratory failure, her recurrent hospitalizations with respiratory failure, and her severe COPD. He shows persistent hypercarbia on ABG drawn on BiPAP drawn on 9/15/21 and 9/21/21, demonstrating failure of BiPAP. Patient is at high risk for rehospitalization, harm and death without NIV.

## 2021-09-22 NOTE — PLAN OF CARE
Nutrition Problem #1: Inadequate oral intake  Intervention: Food and/or Nutrient Delivery: Continue Current Diet, Start Oral Nutrition Supplement  Nutritional Goals: patient will accept and consume at least 50% of her meals on ADULT DIET;  Dysphagia - Minced and Moist; 4 carb choices per meal diet order x 3 meals per day + she will consume 50% of greater of Ensure Compact with meals

## 2021-09-22 NOTE — CARE COORDINATION
Maribel  Received request from [address] [place] [address]. CM regarding IV benefit verification. Sent benefit verification on Cefepime 2g IV Q12 and Vancomycin 500mg IV Q24 per MAR. Liaison to follow up with CM when coverage information received. Notified [facility] of plan for discharge home with family tomorrow 9/23 and [facility] 9/24 for IV abx. [facility] is able to see pt for a SOC in am 9/24 at 0800 for IV abx. Team aware of PC referral to [facility]. order for SN, PT/OT, HHA. Liaison to follow up with pt and family prior to discharge. 1:30 Received coverage information back from Shena Yang with [employer]. Pt copay for Cefepime 2g Q12 is $170/wk and copay for Vancomycin 500mg QD is $92/wk. [facility] will cover IV supplies. Spoke with Lashell JAIME in follow up with above coverage information.         Electronically signed by Lakeshia Platt RN on 9/22/2021 at 11:19 AM

## 2021-09-22 NOTE — PROGRESS NOTES
Comprehensive Nutrition Assessment    Type and Reason for Visit:  Initial (no + screen completed, however, patient has been admitted x 6 days and has had poor po intake (< 50%) x 6 days admission)    Nutrition Recommendations/Plan:   1. Modified diet order to ADULT DIET; Dysphagia - Minced and Moist; 4 carb choices per meal diet order. 2. Added Ensure Compact with meals. 3. Monitor appetite, meal intake, and acceptance/intake of ONS. 4. Monitor nutrition-related labs, bowel function, and weight trends. Nutrition Assessment:  patient is nutritionally compromised AEB poor po intake PTA and patient was d/c'd from [facility] on 9/14/21 (she had been admitted 9/9/21-9/14/21) and she is at risk for further compromise d/t ongoing poor po intake, respiratory dysfunction, and altered nutrition-related labs; will modify diet order to ADULT DIET; Dysphagia - Minced and Moist; 4 carb choices per meal diet order and add Ensure Compact with meals    Malnutrition Assessment:  Malnutrition Status: At risk for malnutrition  Context:  Chronic Illness     Findings of the 6 clinical characteristics of malnutrition:  Energy Intake:  7 - 75% or less estimated energy requirements for 1 month or longer  Weight Loss:  No significant weight loss     Body Fat Loss:  Unable to assess     Muscle Mass Loss:  Unable to assess    Fluid Accumulation:  1 - Mild Extremities (BUE + 1 pitting edema)   Strength:  Not Performed    Estimated Daily Nutrient Needs:  Energy (kcal):  1400 - 1500 kcals based on 28-30 kcals/kg/CBW; Weight Used for Energy Requirements:  Current     Protein (g):  60 - 75 g protein based on 1.2-1.5 g/kg/CBW;  Weight Used for Protein Requirements:  Current        Fluid (ml/day):  1400 - 1500 ml; Method Used for Fluid Requirements:  1 ml/kcal      Nutrition Related Findings:  patient is A & O x 4 + able to follow commands; she is forgetful at times; + Kashia and poor dentition - patient has upper dentures; abdomen is soft, non-tender, and bowel sounds are active; + BM on 9/22/21; BUE + 1 pitting edema noted; skin color is appropriate for ethnicity + dry, swollen, and warm      Wounds:  None       Current Nutrition Therapies:    ADULT DIET; Dysphagia - Minced and Moist; 4 carb choices (60 gm/meal)  Adult Oral Nutrition Supplement; Standard 4 oz Oral Supplement    Anthropometric Measures:  · Height: 5' 2\" (157.5 cm)  · Current Body Weight: 110 lb 6 oz (50.1 kg) (obtained on 9/22/21)   · Admission Body Weight: 106 lb 8 oz (48.3 kg) (obtained on 9/17/21; actual weight)    · Usual Body Weight: 109 lb 4 oz (49.6 kg) (obtained on 6/1/21; actual weight)     · Ideal Body Weight: 110 lbs; % Ideal Body Weight 100.3 %   · BMI: 20.2    · BMI Categories: Underweight (BMI less than 22) age over 72       Nutrition Diagnosis:   · Inadequate oral intake related to inadequate protein-energy intake, impaired respiratory function, increase demand for energy/nutrients as evidenced by poor intake prior to admission, intake 0-25%, intake 26-50%, lab values      Nutrition Interventions:   Food and/or Nutrient Delivery:  Continue Current Diet, Start Oral Nutrition Supplement  Nutrition Education/Counseling:  No recommendation at this time   Coordination of Nutrition Care:  Continue to monitor while inpatient, Coordination of Community Care    Goals:  patient will accept and consume at least 50% of her meals on ADULT DIET;  Dysphagia - Minced and Moist; 4 carb choices per meal diet order x 3 meals per day + she will consume 50% of greater of Ensure Compact with meals       Nutrition Monitoring and Evaluation:   Behavioral-Environmental Outcomes:  None Identified   Food/Nutrient Intake Outcomes:  Food and Nutrient Intake, Supplement Intake  Physical Signs/Symptoms Outcomes:  Biochemical Data, Chewing or Swallowing, Fluid Status or Edema, Hemodynamic Status, Meal Time Behavior, Nutrition Focused Physical Findings, Skin, Weight     Discharge Planning:    Continue current diet, Continue Oral Nutrition Supplement     Electronically signed by Carol Lucas RD, MILENA on 9/22/21 at 5:11 PM EDT    Contact: [phone]

## 2021-09-22 NOTE — PROGRESS NOTES
Inpatient Occupational Therapy  Evaluation and Treatment    Unit: PCU  Date:  9/22/2021  Patient Name:    Dedra Easton  Admitting diagnosis:  Acute respiratory failure with hypoxia [facility]) [J96.01]  Acute respiratory failure with hypoxia and hypercapnia ([facility] [address] [J96.01, J96.02]  Pneumonia due to infectious organism, unspecified laterality, unspecified part of lung [J18.9]  Admit Date:  9/15/2021  Precautions/Restrictions/WB Status/ Lines/ Wounds/ Oxygen: fall risk, IV, bed/chair alarm, bond catheter  and supplemental O2 (2.5L)    Treatment Time:  9:02-9:50  Treatment Number: 1     Billable Treatment Time: 38 minutes   Total Treatment Time:   48  minutes    Patient Goals for Therapy:  \" to go home \"      Discharge Recommendations: Home with 24/7 assist and home therapy  DME needs for discharge: Needs Met       Therapy recommendations for staff:   Assist of 1 with use of rolling walker (RW) for all transfers to/from BSC/chair    History of Present Illness: H & P as per Macy MD Carito's note dated 9/16/2021  81 y.o. female who presented to ER today with acute shortness of breath and change in mental status.  Saturation was reported 58% at the extended care facility. Was just recently discharged to extended care facility from [facility]. 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 Remains full code at this time POA is patient's sister. Patient's niece is at bedside  Patient is somewhat lethargic with BiPAP on her. Home Health S4 Level Recommendation:  Level 3 Safety  AM-PAC Score: AM-PAC Inpatient Daily Activity Raw Score: 16    Preadmission Environment    Pt.  Lives with family (Son who works just across the street )  Home environment:    one story home  Steps to enter first floor: 1 steps to enter and no handrail  Steps to second floor: N/A  Bathroom: tub/shower unit, and standard height commode,tub transfer bench  Equipment owned: alessandro CAMPOS (manual), lift chair, hospital bed  (doesn't go up and down), home O2 (2L) continous and lifealert     Preadmission Status:  Pt. Able to drive: No  Pt Fully independent with ADLs: No  Pt. Required assistance from family for: Cleaning, Cooking and Laundry   Pt. independent for transfers and gait and walked with No Device  History of falls No    Pain  No  Rating:NA  Location:  Pain Medicine Status: Denies need      Cognition    A&O x4   Able to follow 2 step commands. Patient becomes anxious quickly with activity. Subjective  Patient lying supine in bed with daughter present. Pt agreeable to this OT eval & tx. Upper Extremity ROM:    WFL,  pt able to perform all bed mobility, transfers, and gait without ROM limitation. Upper Extremity Strength:    BUE strength impaired but not formally assessed w/ MMT    Upper Extremity Sensation    WFL    Upper Extremity Proprioception:  Impaired    Coordination and Tone  Impaired    Balance  Functional Sitting Balance:  WFL  Functional Standing Balance:Impaired (CG-min A)    Bed mobility:    Supine to sit:   SBA  Sit to supine:   Not Tested  Rolling:    Not Tested  Scooting in sitting:  SBA  Scooting to head of bed:   Not Tested    Bridging:   Not Tested    Transfers:    Sit to stand:  CGA  Stand to sit:  CGA  Bed to chair:   Min A and with use of RW  Standard toilet: Not Tested  Bed to [facility]:  Not Tested    Dressing:      UE:   Not Tested  LE:    Mod A to don socks    Bathing:    UE:  Not Tested  LE:  Not Tested    Eating:   Not Tested    Toileting:  Not Tested     Activity Tolerance   Pt completed therapy session with SOB noted with position changes  SpO2: 96% on 2.5L  HR: 109  BP: 159/97 at rest    Positioning Needs:   Up in chair, call light and needs in reach.       Exercise / Activities Initiated:   N/A    Patient/Family Education:   Role of OT  Recommendations for DC  Safe RW use/hand placement    Assessment of Deficits: Pt seen for Occupational therapy evaluation in acute care setting. Pt demonstrated decreased Activity tolerance, ADLs, IADLs, Balance , Bathing, Bed mobility, Dressing, ROM, Safety Awareness, Strength, Transfers and Cognition. Pt functioning below baseline and will likely benefit from skilled occupational therapy services to maximize safety and independence. Goal(s) : To be met in 3 Visits:  1). Bed to toilet/BSC: Supervision    To be met in 5 Visits:  1). Supine to/from Sit:  Supervision  2). Upper Body Bathing:   Supervision  3). Lower Body Bathing:   Min A  4). Upper Body Dressing:  Supervision  5). Lower Body Dressing:  Min A  6). Pt to demonstrate UE exs x 15 reps with minimal cues    Rehabilitation Potential:  Fair for goals listed above. Strengths for achieving goals include: PLOF, Family Support and Pt cooperative  Barriers to achieving goals include:  Complexity of condition     Plan: To be seen 3-5 x/wk while in acute care setting for therapeutic exercises, bed mobility, transfers, dressing, bathing, family/patient education, ADL/IADL retraining, energy conservation training.        [employer], OTR/L #[id number]      If patient discharges from this facility prior to next visit, this note will serve as the Discharge Summary

## 2021-09-22 NOTE — PROGRESS NOTES
09/22/21 0230   NIV Type   NIV Started/Stopped On   Equipment Type v60   Mode Bilevel   Mask Type Full face mask   Mask Size Medium   Settings/Measurements   IPAP 20 cmH20   CPAP/EPAP 5 cmH2O   Rate Ordered 18   Resp 18   FiO2  35 %   I Time/ I Time % 0.9 s   Vt Exhaled 614 mL   Minute Volume 12.1 Liters   Mask Leak (lpm) 1 lpm   Comfort Level Good   Using Accessory Muscles No   SpO2 98

## 2021-09-22 NOTE — PROGRESS NOTES
Shift assessment completed. Patient is on 2 L of oxygen via nasal cannula, oxygen saturation 97-98%. Patient does not wish to titrate to RA. Rhonchi noted on assessment, and patient with moist non-productive cough at present. Patient denies pain/discomfort, declined repositioning at present. Bill catheter in place, draining yellow clear urine. Midline to left upper arm assessed for patency. Brisk blood return noted when using 3 ml syringe following 2 10 ml NS flushes. Call light in reach, bed alarm on. Family at bedside.

## 2021-09-22 NOTE — PROGRESS NOTES
Patient agreeable to SCD pumps, placed at this time. Heels elevated off bed utilizing pillow support. Patient requested for mepilex's to elbows to be removed. Removed and cleansed arms per patient request, elevated on additional pillows for comfort. Patient denies additional needs a this time. Call light in reach. Family at bedside.

## 2021-09-22 NOTE — CARE COORDINATION
INTERDISCIPLINARY PLAN OF CARE CONFERENCE    Date/Time: 9/22/2021 12:01 PM  Completed by: Deacon Simms RN, Case Management      Patient Name:  Asa Chan  YOB: 1940  Admitting Diagnosis: Acute respiratory failure with hypoxia ([facility] [address] [J96.01]  Acute respiratory failure with hypoxia and hypercapnia ([address] [J96.01, J96.02]  Pneumonia due to infectious organism, unspecified laterality, unspecified part of lung [J18.9]     Admit Date/Time:  9/15/2021  5:28 PM    Chart reviewed. Interdisciplinary team contacted or reviewed plan related to patient progress and discharge plans. Disciplines included Case Management, Nursing, and Dietitian. Current Status:ongoing  PT/OT recommendation for discharge plan of care: SNF    Expected D/C Disposition:  Home  Confirmed plan with patient and/or family Yes confirmed with: (name) pt and daughterDionne with:pt and daughterLiliana  Discharge Plan Comments: Reviewed chart. Role of discharge planner explained and patient and daughter, Liliana Mallory, verbalized understanding. Pt is from [facility] for SNF, but plans to go home. Family states pt will have 24/7 assist.  Pt will be going home with IV abx (cefepime 2000mg every 12 hours x 7 days AND vancomycin 500mg IV every 24 hours x 7 days via midline PICC). Pt plans to go home with [facility]. Family will be there and are teachable. CM called Dayana Claros with [facility] and she is finding out the out of pocket cost to pt. She is aware of the services pt is needing. Start of Care with [facility] is 9/24/2021    Hospice of [address] [place] [address], met with family. Monroe Harden did not meet with CM. Family states that it was stated pt had to be a DNR. CM explained that for Palliative Care, pt could be Full Code. They decline Hospice currently. Pt and Juan would like through [facility] PT/OT/SN/HHA/and Palliative Care for pt and remain a full code.    CM spoke with pt and Juan, at length, after their meeting with hospice, to explain the purpose of Palliative Care. They verbalized understanding. They stated that they wish they knew pt could have remained a fullcode with PC, and they would like PC as a full code. CM verbalized understanding and state CM will add this to [facility]. order. They bother verbalized understanding. ADDISON spoke with Dr. María Elena Murphy. He stated pt was in the process of getting a Asteril (respiratory assist Device) prior to her discharge from [facility] on 9/14/2021 to [facility]. She never received this. CM called Thelma Garza with [facility], as the Asteril was coming from there He states he still has the orders and no new info or orders are needed and that he will have the Asteril delivered to the hospital today. CM informed Dr. María Elena Murphy of this with verbalized understanding. Cm informed Ruth Feliciano RN of this. Pt is active with [facility] for home O2 2-2.5L of O2. Awaiting cost of IV abx. Awaiting Asteril to be delivered to pt's room from [address] [place] [address]. +HC orders, +eCOC    Addendum 9351: Linda Cancino with [facility] called and stated that pt and Juan (daughter) declined Hospice d/t stating\"she plans to bring the pt to the hospital everytime\". Addendum 1400: Per Mary with [facility], Medicare part D covered copays, therefore nothing is due upfront. Cefepime is $170/week. Vancomycin is $92/week. Pt and her granddaughter confirm she can afford. Granddaughter sent the info to [facility] via text. Pt will need ambulance transport home. They have one step to get into the home. Home O2 in place on admit: Yes  Pt informed of need to bring portable home O2 tank on day of discharge for nursing to connect prior to leaving:  Yes  Verbalized agreement/Understanding:   Yes

## 2021-09-22 NOTE — PROGRESS NOTES
09/21/21 2328   NIV Type   Skin Protection for O2 Device Yes   NIV Started/Stopped On   Equipment Type v60   Mode Bilevel   Mask Type Full face mask   Mask Size Medium   Settings/Measurements   IPAP 20 cmH20   CPAP/EPAP 5 cmH2O   Rate Ordered 18   Resp 18   FiO2  35 %   I Time/ I Time % 0.9 s   Vt Exhaled 819 mL   Minute Volume 19.5 Liters   Mask Leak (lpm) 9 lpm   Comfort Level Good   Using Accessory Muscles No   SpO2 98   Alarm Settings   Alarms On Y   Press Low Alarm 16 cmH2O   High Pressure Alarm 35 cmH2O   Delay Alarm 20 sec(s)   Resp Rate Low Alarm 16   High Respiratory Rate 40 br/min

## 2021-09-22 NOTE — PROGRESS NOTES
Admit: 9/15/2021    Name:  Alicia Rojas  Room:  /2829-59  MRN:    [id number]     Daily Progress Note for 9/22/2021       Presented from nursing home with acute respiratory distress and altered mental status. Placed on BiPAP  She had just been discharged to the nursing home from the hospital after being admitted for HCAP and acute respiratory failure. Daughter reports recent hx of CVA. Also recent admission to [facility] with severe COPD and PNA. hx of lower GI bleed, Hx of anal fissure bleed per daughter's report. Pt with admissions in June, August.   This is her third admission this month alone. Had goals of care discussed at [facility] facility last admission - with diagnosis of End stage COPD - pt and family insist on full code.              Interval History:     Ms Smith used bipap overnight   Feels good today  Now on 2 L oxygen     Met with hospice today and have not decided yet      Scheduled Meds:   vancomycin  500 mg IntraVENous Q24H    predniSONE  40 mg Oral Daily    fluticasone  2 spray Each Nostril Daily    miconazole nitrate   Topical BID    atorvastatin  20 mg Oral Nightly    docusate sodium  100 mg Oral BID    ferrous sulfate  325 mg Oral Daily with breakfast    metoprolol tartrate  25 mg Oral BID    pantoprazole  40 mg IntraVENous Daily    Roflumilast  250 mcg Oral Daily    sertraline  50 mg Oral Daily    cefepime  2,000 mg IntraVENous Q12H    sodium chloride flush  5-40 mL IntraVENous 2 times per day    guaiFENesin  600 mg Oral BID    insulin lispro  0-6 Units SubCUTAneous TID WC    insulin lispro  0-3 Units SubCUTAneous Nightly    sodium chloride flush  5-40 mL IntraVENous 2 times per day    ipratropium-albuterol  1 ampule Inhalation Q4H While awake       Continuous Infusions:   sodium chloride      sodium chloride      sodium chloride      dextrose      sodium chloride 250 mL (09/18/21 0837)       PRN Meds:  albuterol-ipratropium, sodium chloride, sodium chloride, bisacodyl, sodium chloride flush, sodium chloride, acetaminophen **OR** acetaminophen, albuterol, glucose, dextrose, glucagon (rDNA), dextrose, sodium chloride flush, sodium chloride, ondansetron                  Objective:     Temp  Av.7 °F (36.5 °C)  Min: 96.5 °F (35.8 °C)  Max: 98.2 °F (36.8 °C)  Pulse  Av.3  Min: 87  Max: 108  BP  Min: 124/79  Max: 143/82  SpO2  Av.3 %  Min: 98 %  Max: 100 %  FiO2   Av %  Min: 35 %  Max: 35 %  No data found. Intake/Output Summary (Last 24 hours) at 2021 0707  Last data filed at 2021 0032  Gross per 24 hour   Intake 370 ml   Output 1300 ml   Net -930 ml       Physical Exam:      General:  Elderly female up in bed,  Awake, alert and oriented. Appears to be not in any distress  Mucous Membranes:  Pink , anicteric  Neck: No JVD, no carotid bruit, no thyromegaly  Chest:  Improving justa air entry ,no  accessory muscle use  Occasional wheeze   Cardiovascular:  RRR S1S2 heard, no murmurs or gallops  Abdomen:  Soft, undistended, non tender, no organomegaly, BS present  Extremities: scattered ecchymoses  No edema or cyanosis. Distal pulses well felt  Neurological : grossly normal           Lab Data:  CBC:   Recent Labs     21  0435 21  0605   WBC 8.1 10.1   RBC 3.72* 3.94*   HGB 10.2* 10.9*   HCT 32.3* 34.8*   MCV 86.8 88.3   RDW 19.7* 20.1*    222     BMP:   Recent Labs     21  0559 21  0605    140   K 3.3* 3.3*    107   CO2 25 25   BUN 10 10   CREATININE 0.7 0.9     CARDIAC ENZYMES:   No results for input(s): CKMB, CKMBINDEX, TROPONINI in the last 72 hours.     Invalid input(s): CKTOTAL;3  FASTING LIPID PANEL:  Lab Results   Component Value Date    CHOL 146 2019    HDL 63 2019    TRIG 110 2019     LIVER PROFILE:   Recent Labs     21  0559 21  0605   AST 11* 14*   ALT 6* 8*   BILITOT <0.2 <0.2   ALKPHOS 64 69         IR MIDLINE CATH   Final Result Successful placement of midline catheter. XR CHEST PORTABLE   Final Result   Right IJ catheter with tip in the SVC. Small bilateral pleural effusions. Mild pulmonary edema. XR CHEST PORTABLE   Final Result   Mild-to-moderate pulmonary vascular congestion worse since yesterday. Mild   left retrocardiac atelectasis or less likely pneumonia. Mild-to-moderate   underlying bullous changes. XR CHEST PORTABLE   Final Result   In this patient with probable changes of COPD, there are stable diffuse   reticular opacities which may represent mild pulmonary edema. Underlying   pneumonitis is not excluded. VL Extremity Venous Right   Final Result      XR CHEST PORTABLE   Final Result   No significant early change in diffuse bilateral opacity which can reflect   pulmonary edema or atypical infection. XR CHEST PORTABLE   Final Result   Status post right IJ catheter placement in good position with no pneumothorax. Slowly resolving pulmonary congestion. Chronic obstructive lung changes with slowly resolving perihilar and   bibasilar opacities. CT HEAD WO CONTRAST   Final Result   No acute intracranial abnormality. Chronic and age related changes including age-appropriate cerebral volume   loss and scattered nonspecific white matter hypodensities which are likely   the sequela of chronic small vessel ischemic disease. Partial opacification of the bilateral middle ear cavities which could   indicate otitis media. Small air-fluid levels are noted in the bilateral maxillary and sphenoid   sinuses, correlate clinically for evidence of acute sinusitis. XR CHEST PORTABLE   Final Result   Findings compatible with pulmonary interstitial edema. Given the normal   heart size, consider both cardiogenic and noncardiogenic etiologies,   including atypical infections.            EKG-  Sinus rhythm with Premature supraventricular complexesWhen Assessment & Plan:         Acute on chronic hypoxic and hypercarbic respiratory failure  With end-stage COPD with acute exacerbation  3rd admission for same this month alone. BiPAP PRN. Currently on 2 L of oxygen. Continue IV Solu-Medrol  Continue IV cefepime and vancomycin day 6/14 for HCAP  Mucinex  Daliresp  Pulmonary consulted       Healthcare associate pneumonia  Elevated procalcitonin  Continue IV cefepime and vancomycin for presumable gram-negative and/are MRSA pneumonia. Sputum cultures pending  Improving resp status slowly   Given recurrent admissions, pulm recommends 14 days of cefepime and vanc       Metabolic encephalopathy - sec to hypercarbia   Resolved       Acute on Chronic anemia  Hx of lower GI bleed. pRBC 2 units tx 9/18  Hgb up to 10.9 and no signs of active bleeding. I am concerned she is not a candidate for endoscopy with end stage COPD. Monitor Hgb        CAD/HTN- continued home BB,   Clopidogrel stopped due to worsening anemia. atorvastatin  Mildly elevated troponin likely type II demand based ischemia       Mood disorder continued home sertraline       GERD- continued home PPI      Right upper extremity swelling. Obtain venous Doppler   + Rt superficial venous thrombus. Not a candidate for [facility] if this were to progress - due to severe anemia. DVT Prophylaxis: SCD  Diet: Dysphagia. Code Status: Full Code     PT/OT Eval Status:p     Dispo - cc. Very poor prognosis. pt and family insisted on full code.     pt refusing SNF, might be an issue at [place] as she lives alone  Family wished for palliative care and met with hospice today      Ta Ellis MD, 9/22/2021 7:07 AM

## 2021-09-22 NOTE — PROGRESS NOTES
Shift assessment complete, see flowsheets. Medications given, see MAR. Pt repositioned and educated on the importance of repositioning and potential outcome of refusing turns. Pt did not seem interested in education, needs reinforcement. Call light in easy reach, bedside table in easy reach, and bed in lowest position.   Modesto Cruz RN

## 2021-09-22 NOTE — PROGRESS NOTES
Inpatient Physical Therapy Evaluation and Treatment    Unit: PCU  Date:  9/22/2021  Patient Name:    Mak Triplett  Admitting diagnosis:  Acute respiratory failure with hypoxia [facility]) [J96.01]  Acute respiratory failure with hypoxia and hypercapnia (HCC) [J96.01, J96.02]  Pneumonia due to infectious organism, unspecified laterality, unspecified part of lung [J18.9]  Admit Date:  9/15/2021  Precautions/Restrictions/WB Status/ Lines/ Wounds/ Oxygen: Fall risk, Bed/chair alarm, Lines -IV, Supplemental O2 (2.5) and Bill catheter, [place] (hard of hearing) and Telemetry    Treatment Time: 0900 - 0950  Treatment Number:  1   Timed Code Treatment Minutes: 40 minutes  Total Treatment Minutes:  50  minutes    Patient Goals for Therapy: \" Go home \"          Discharge Recommendations: SNF (patient refusing SNF will need home with 24hr assist with home PT)  DME needs for discharge: RW       Therapy recommendation for EMS Transport: can transport by wheelchair    Therapy recommendations for staff:   Assist of 1 with use of rolling walker (RW) and gait belt for all transfers and ambulation to/from [facility]  to/from chair. Patient refusing to use BSC due to increased anxiety during use of BSC hence wanted to continue to using bed pan. History of Present Illness: H & P as per Juan Riley MD's note dated 9/16/2021  81 y.o. female who presented to ER today with acute shortness of breath and change in mental status.  Saturation was reported 58% at the extended care facility. Was just recently discharged to extended care facility from [facility]. 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 Remains full code at this time POA is patient's sister. Patient's niece is at bedside  Patient is somewhat lethargic with BiPAP on her.     Home Health S4 Level Recommendation:  Level 3 Safety  AM-PAC Mobility Score    AM-PAC Inpatient Mobility Raw Score : 16  AM-PAC Inpatient Mobility without Stair Climbing Raw Score : 14    Preadmission Environment    Pt. Christos Fajardo family (Son who works just across the [employer])  Home environment:    one story home  Steps to enter first floor: 1 steps to enter and no handrail  Steps to second floor: N/A  Bathroom: tub/shower unit, and standard height commode,tub transfer bench  Equipment owned: SW, wc (manual), lift chair, hospital bed  (doesn't go up and down), home O2 (2L) continous and lifealert     Preadmission Status:  Pt. Able to drive: No  Pt Fully independent with ADLs: No  Pt. Required assistance from family for: Cleaning, Cooking and [address]  Pt. independent for transfers and gait and walked with No Device  History of falls No    Pain   No    Cognition    A&O x4   Able to follow 2 step commands    Subjective  Patient lying supine in bed with daughter present. Pt agreeable to this PT eval & tx. Patient becomes anxious quickly with activity. Upper Extremity ROM/Strength  Please see OT evaluation.       Lower Extremity ROM / Strength   AROM WFL: Yes  ROM limitations: N/A    Strength Assessment (measured on a 0-5 scale):  R LE   Quad   3+   Ant Tib  3+   Hamstring 3+   Iliopsoas 3+  L LE  Quad   3+   Ant Tib  3+   Hamstring 3+   Iliopsoas 3+    Lower Extremity Sensation    WFL    Lower Extremity Proprioception:   WFL    Coordination and Tone  WFL    Balance  Sitting:  Good - ; SBA  Comments: 15 min    Standing: Fair -; CGA  Comments: 1 min    Bed Mobility   Supine to Sit:    SBA  Sit to Supine:   Not Tested  Rolling:   Not Tested  Scooting in sitting: SBA  Scooting in supine:  Not Tested    Transfer Training     Sit to stand:   CGA  Stand to sit:   CGA  Bed to Chair:   CGA with use of gait belt and rolling walker (RW)    Gait gait completed as indicated below  Distance:      3 ft (bed to chair)  Deviations (firm surface/linoleum):  decreased elbert, increased ADELINA, decreased step length bilaterally and decreased stance time bilaterally  Assistive Device Used:    gait belt and rolling walker (RW)  Level of Assist:    CGA  Comment: Patient was limited in walking distance due to increased anxiety and SOB. Stair Training deferred, pt unsafe/ not appropriate to complete stairs at this time    Activity Tolerance   Pt completed therapy session with SOB noted with all functional mobility  SpO2: 96% on 2.5L  HR: 109  BP: 159/97 at rest     Positioning Needs   Pt up in chair, alarm set, positioned in proper neutral alignment and pressure relief provided. Call light provided and all needs within reach    Exercises Initiated  all completed bilaterally unless indicated  Pursed lip breathing 3-5x during functional mobility to reduce SOB and anxiety    Other  None. Patient/Family Education   Pt educated on role of inpatient PT, POC, importance of continued activity, DC recommendations, safety awareness, transfer techniques, pursed lip breathing, energy conservation, pacing activity and calling for assist with mobility. Assessment  Pt seen for Physical Therapy evaluation in acute care setting. Pt demonstrated decreased Activity tolerance, Balance, Safety and Strength as well as decreased independence with Ambulation, Bed Mobility  and Transfers. Recommending SNF upon discharge as patient functioning well below baseline, demonstrates good rehab potential and unable to return home due to limited or no family support, inability to negotiate stairs to enter home/bedroom/bathroom, burden of care beyond caregiver ability, home environment not conducive to patient recovery and limited safety awareness. Patient and daughter currently refuse SNF placement upon discharge since patient had increased anxiety with unfamiliar people, places and prefers to be home since can control anxiety with ease. Patient will need home with 24hr assist with home PT with RW. Goals : To be met in 3 visits:  1). Independent with LE Ex x 10 reps    To be met in 6 visits:  1). Supine to/from sit: SBA  2). Sit to/from stand: SBA  3). Bed to chair: SBA  4). Gait: Ambulate 10 ft.   with  SBA and use of LRAD (least restrictive assistive device)  5). Tolerate B LE exercises 3 sets of 10-15 reps  6). Ascend/descend 1 steps with SBA with use of no handrail and LRAD (least restrictive assistive device)    Rehabilitation Potential: Fair  Strengths for achieving goals include:   PLOF and Family Support   Barriers to achieving goals include:    Complexity of condition and Weakness    Plan    To be seen 3-5 x / week  while in acute care setting for therapeutic exercises, bed mobility, transfers, progressive gait training, balance training, and family/patient education. Signature: Aime Hobbs MS PT, # [id number]    If patient discharges from this facility prior to next visit, this note will serve as the Discharge Summary.

## 2021-09-22 NOTE — DISCHARGE INSTR - COC
Continuity of Care Form    Patient Name: Georgia Mistry   :    MRN:  [id number]    Admit date:  9/15/2021  Discharge date:  21    Code Status Order: Full Code   Advance Directives:      Admitting Physician:  Yanira Bosch MD  PCP: Marbella Madrigal MD    Discharging Nurse: Farnaz Omer [address] Unit/Room#: /9357-58  Discharging Unit Phone Number: [phone]    Emergency Contact:   Extended Emergency Contact Information  Primary Emergency Contact: [address] of [address] [place] [address] Phone: [phone]  Relation: Child  Secondary Emergency Contact: [address] [facility] Phone: [phone]  Relation: Other    Past Surgical History:  Past Surgical History:   Procedure Laterality Date    BRONCHOSCOPY  2019    Dr. Marce Whaley - w/BAL   [address]  2019    Dr. Madina Poon N/A 2020    COLONOSCOPY DIAGNOSTIC performed by Holger Langston DO at [address] [facility] N/A 2019    OFF PUMP CORONARY ARTERY BYPASS GRAFTING X2, INTERNAL MAMMARY ARTERY, SAPHENOUS VEIN GRAFT performed by Isis Ghotra MD at [facility] CATH  2021    [facility] CATH 2021 [address] [facility], PARTIAL Left     SIGMOIDOSCOPY  2020    4 bands    SIGMOIDOSCOPY N/A 2020    FLEX SIG W/ BANDING SIGMOIDOSCOPY DIAGNOSTIC FLEXIBLE performed by Holger Langston DO at [facility]       Immunization History:   Immunization History   Administered Date(s) Administered    COVID-19, Pfizer, PF, 30mcg/0.3mL 2021, 2021    Influenza Virus Vaccine 10/17/2013    Influenza, [place], 6 mo and older, IM, PF (Flulaval, Fluarix) 2019    Influenza, Quadv, IM, PF (6 mo and older Fluzone, Flulaval, Fluarix, and 3 yrs and older Afluria) 10/04/2019    Pneumococcal Conjugate 13-valent ([id number]) 2017    Pneumococcal Polysaccharide ([id number]) 02/19/2015    Tdap (Boostrix, Adacel) 07/17/2018       Active Problems:  Patient Active Problem List   Diagnosis Code    Hyperlipidemia E78.5    Asthma J45.909    OA (osteoarthritis) M19.90    Tobacco use Z72.0    COPD exacerbation ([facility] J44.1    Sepsis ([facility] A41.9    Abnormal chest x-ray R93.89    COPD with acute exacerbation ([facility]) J44.1    Shortness of breath R06.02    Tobacco abuse Z72.0    Moderate malnutrition ([facility]) E44.0    NSTEMI (non-ST elevated myocardial infarction) ([facility]) I21.4    Acute respiratory failure with hypoxia ([facility]) J96.01    CAD (coronary artery disease) I25.10    Acute pulmonary edema ([facility]) J81.0    Pulmonary infiltrate R91.8    Elevated brain natriuretic peptide (BNP) level R79.89    Leukocytosis D72.829    Ischemic cardiomyopathy I25.5    Acute combined systolic and diastolic congestive heart failure ([facility]) I50.41    Hypernatremia E87.0    NADJA (acute kidney injury) ([facility] N17.9    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    Mucus plugging of bronchi T17.500A    CAD in native artery I25.10    S/P CABG (coronary artery bypass graft) Z95.1    Pneumonia of both lower lobes due to methicillin resistant Staphylococcus aureus (MRSA) ([facility]) J15.212    GI bleed K92.2    Severe malnutrition ([facility]) E43    Chest pain R07.9    Chronic respiratory failure with hypoxia ([facility]) J96.11    Essential hypertension I10    Anemia D64.9    Acute respiratory failure ([facility]) J96.00    Acute respiratory distress R06.03    Volume overload E87.70    Demand ischemia ([facility]) I24.8    GERD (gastroesophageal reflux disease) K21.9    Acute respiratory failure with hypoxia and hypercapnia ([facility]) J96.01, J96.02    Shock ([facility]) R57.9    Pneumonia due to infectious organism J18.9    Acute on chronic respiratory failure with hypoxia and hypercapnia ([facility]) J96.21, J96.22    Acute exacerbation of chronic obstructive pulmonary disease (COPD) ([facility] J44.1 Isolation/Infection:   Isolation            No Isolation          Patient Infection Status       Infection Onset Added Last Indicated Last Indicated By Review Planned Expiration Resolved Resolved By    None active    Resolved    COVID-19 Rule Out 09/16/21 09/16/21 09/16/21 COVID-19 & Influenza Combo (Ordered)   09/16/21 Rule-Out Test Resulted    COVID-19 Rule Out 09/09/21 09/09/21 09/09/21 COVID-19 (Ordered)   09/10/21 Ignacia Beck RN    COVID-19 Rule Out 09/09/21 09/09/21 09/09/21 COVID-19, Rapid (Ordered)   09/09/21 Rule-Out Test Resulted    COVID-19 Rule Out 09/09/21 09/09/21 09/09/21 SARS-CoV-2 NAAT (Rapid) (Ordered)   09/09/21 Rule-Out Test Resulted    COVID-19 Rule Out 08/30/21 08/30/21 08/30/21 COVID-19 (Ordered)   08/30/21 Rule-Out Test Resulted    COVID-19 Rule Out 08/29/21 08/29/21 08/29/21 COVID-19, Rapid (Ordered)   08/29/21 Rule-Out Test Resulted    COVID-19 Rule Out 06/01/21 06/01/21 06/01/21 COVID-19 & Influenza Combo (Ordered)   06/01/21 Rule-Out Test Resulted    MRSA 09/22/19 09/25/19 09/22/19 Respiratory Culture   06/01/21 Derik Robles RN            Nurse Assessment:  Last Vital Signs: /80   Pulse 87   Temp 98 °F (36.7 °C) (Oral)   Resp 20   Ht 5' 2\" (1.575 m)   Wt 110 lb 6 oz (50.1 kg)   SpO2 98%   BMI 20.19 kg/m²     Last documented pain score (0-10 scale): Pain Level: 5 (headache)  Last Weight:   Wt Readings from Last 1 Encounters:   09/22/21 110 lb 6 oz (50.1 kg)     Mental Status:  oriented and alert    IV Access:  - Left midline    Nursing Mobility/ADLs:  Walking   Assisted  Transfer  Assisted  Bathing  Assisted  Dressing  Assisted  Toileting  Assisted  Feeding  Independent  Med Admin  Assisted  Med Delivery   crushed and prefers mixed with applesauce    Wound Care Documentation and Therapy:        Elimination:  Continence:   · Bowel:  Yes  · Bladder: Yes  Urinary Catheter: Removal Date 9/23   Colostomy/Ileostomy/Ileal Conduit: No       Date of Last BM:     Intake/Output Summary (Last 24 hours) at 9/22/2021 1212  Last data filed at 9/22/2021 0910  Gross per 24 hour   Intake 420 ml   Output 1300 ml   Net -880 ml     I/O last 3 completed shifts: In: 370 [P.O.:360; I.V.:10]  Out: 1300 [Urine:1300]    Safety Concerns: At Risk for Falls    Impairments/Disabilities:      None    Nutrition Therapy:  Current Nutrition Therapy:   - Oral Diet:  Carb Control 4 carbs/meal (1800kcals/day) and Dysphagia 2 mechanically altered    Routes of Feeding: Oral  Liquids: Thin Liquids  Daily Fluid Restriction: no  Last Modified Barium Swallow with Video (Video Swallowing Test): not done    Treatments at the Time of Hospital Discharge:   Respiratory Treatments:   Oxygen Therapy:  is on oxygen at 2 L/min per nasal cannula.   Ventilator:   - BiPAP   IPAP: 20 cmH20, CPAP/EPAP: 5 cmH2O only when sleeping    Rehab Therapies: Physical Therapy, Occupational Therapy and SN/HHA/Palliative Care (patient wants to remain a Full Code)  Weight Bearing Status/Restrictions: No weight bearing restirctions  Other Medical Equipment (for information only, NOT a DME order):  walker and bedside commode  Other Treatments:     Patient's personal belongings (please select all that are sent with patient):  None    RN SIGNATURE:  Electronically signed by Mahnaz Castillo RN on 9/23/21 at 11:32 AM EDT    CASE MANAGEMENT/SOCIAL WORK SECTION    Inpatient Status Date: 9/16/2021    Readmission Risk Assessment Score:  Readmission Risk              Risk of Unplanned Readmission:  31           Discharging to Facility/ Agency    Name: [facility] Address: [address],[place] [address] [address] [facility] Phone: [phone] Fax: [phone]    7 [facility]: [address] [place] [address] Drive to establish plan of care for patient over 60 day period   [address] Initial home SN evaluation visit to occur within 24-48 hours for:   medication management   VS and clinical assessment   S&S chronic disease exacerbation education + when to contact MD / NP   care coordination   Medication Reconciliation during 1st SN visit     PT/OT/HHA    Evaluations in home within 24-48 hours of discharge to include DME and home safety   [facility] Frontload therapy 5 days, then 3x a week    OT to evaluate if patient has [address] needs for personal care       evaluation within 24-48 hours to evaluate resources & insurance for potential [place], [place], LTC, and Medicaid options       Palliative Care referral within 5 days of hospital discharge  PCP Visit scheduled within 3 - 7 days of hospital discharge      Telehealth-Homecare Vitals(If patient is agreeable and meets guidelines)        IV antibiotics  [facility]   Phone: [phone]  Fax: [phone]      Palliative Care  (as a full code) is with   [address]   Phone: [phone]    / signature: Electronically signed by Conrad Camarillo RN on 9/23/21 at 12:12 PM EDT    PHYSICIAN SECTION    Prognosis: Good    Condition at Discharge: Stable    Rehab Potential (if transferring to Rehab): Fair    Recommended Labs or Other Treatments After Discharge: SN, PT/OT, HHA, MSW  HCV and Zoom Programs    cefepime 2000mg every 12 hours x 6 days AND vancomycin 500mg IV every 24 hours x 6 days via midline PICC  Remove midline after completion of abx    Cbc and bmp in 1 week   Vanc levels every 48 hrs     F.w pulmonary in 2 weeks      Physician Certification: I certify the above information and transfer of Yamil Ojeda  is necessary for the continuing treatment of the diagnosis listed and that she requires Home Care for less 30 days.      Update Admission H&P: No change in H&P    PHYSICIAN SIGNATURE:  Electronically signed by Neema Kimball MD on 9/23/21 at 10:28 AM EDT

## 2021-09-23 ENCOUNTER — HOSPITAL ENCOUNTER (INPATIENT)
Age: 81
LOS: 4 days | Discharge: OTHER INSTITUTION | DRG: 177 | End: 2021-09-27
Attending: STUDENT IN AN ORGANIZED HEALTH CARE EDUCATION/TRAINING PROGRAM | Admitting: HOSPITALIST
Payer: MEDICARE

## 2021-09-23 ENCOUNTER — APPOINTMENT (OUTPATIENT)
Dept: GENERAL RADIOLOGY | Age: 81
DRG: 177 | End: 2021-09-23
Payer: MEDICARE

## 2021-09-23 ENCOUNTER — TELEPHONE (OUTPATIENT)
Dept: OTHER | Facility: CLINIC | Age: 81
End: 2021-09-23

## 2021-09-23 ENCOUNTER — APPOINTMENT (OUTPATIENT)
Dept: CT IMAGING | Age: 81
DRG: 177 | End: 2021-09-23
Payer: MEDICARE

## 2021-09-23 VITALS
DIASTOLIC BLOOD PRESSURE: 76 MMHG | BODY MASS INDEX: 21.53 KG/M2 | HEART RATE: 102 BPM | SYSTOLIC BLOOD PRESSURE: 145 MMHG | RESPIRATION RATE: 20 BRPM | HEIGHT: 62 IN | TEMPERATURE: 98.1 F | WEIGHT: 117 LBS | OXYGEN SATURATION: 97 %

## 2021-09-23 DIAGNOSIS — J96.02 ACUTE RESPIRATORY FAILURE WITH HYPOXIA AND HYPERCAPNIA (HCC): Primary | ICD-10-CM

## 2021-09-23 DIAGNOSIS — I50.9 CONGESTIVE HEART FAILURE, UNSPECIFIED HF CHRONICITY, UNSPECIFIED HEART FAILURE TYPE (HCC): ICD-10-CM

## 2021-09-23 DIAGNOSIS — J44.1 COPD EXACERBATION (HCC): ICD-10-CM

## 2021-09-23 DIAGNOSIS — J18.9 PNEUMONIA DUE TO INFECTIOUS ORGANISM, UNSPECIFIED LATERALITY, UNSPECIFIED PART OF LUNG: ICD-10-CM

## 2021-09-23 DIAGNOSIS — J96.01 ACUTE RESPIRATORY FAILURE WITH HYPOXIA AND HYPERCAPNIA (HCC): Primary | ICD-10-CM

## 2021-09-23 PROBLEM — J96.21 ACUTE ON CHRONIC RESPIRATORY FAILURE WITH HYPOXEMIA (HCC): Status: ACTIVE | Noted: 2021-09-23

## 2021-09-23 LAB
A/G RATIO: 1.2 (ref 1.1–2.2)
A/G RATIO: 1.2 (ref 1.1–2.2)
ALBUMIN SERPL-MCNC: 3 G/DL (ref 3.4–5)
ALBUMIN SERPL-MCNC: 3.5 G/DL (ref 3.4–5)
ALP BLD-CCNC: 69 U/L (ref 40–129)
ALP BLD-CCNC: 98 U/L (ref 40–129)
ALT SERPL-CCNC: 7 U/L (ref 10–40)
ALT SERPL-CCNC: 9 U/L (ref 10–40)
ANION GAP SERPL CALCULATED.3IONS-SCNC: 8 MMOL/L (ref 3–16)
ANION GAP SERPL CALCULATED.3IONS-SCNC: 9 MMOL/L (ref 3–16)
AST SERPL-CCNC: 11 U/L (ref 15–37)
AST SERPL-CCNC: 15 U/L (ref 15–37)
BASE EXCESS VENOUS: -2.5 MMOL/L (ref -3–3)
BASE EXCESS VENOUS: -6.7 MMOL/L (ref -3–3)
BASE EXCESS VENOUS: -8.8 MMOL/L (ref -3–3)
BASOPHILS ABSOLUTE: 0 K/UL (ref 0–0.2)
BASOPHILS ABSOLUTE: 0.1 K/UL (ref 0–0.2)
BASOPHILS RELATIVE PERCENT: 0.2 %
BASOPHILS RELATIVE PERCENT: 0.3 %
BILIRUB SERPL-MCNC: 0.3 MG/DL (ref 0–1)
BILIRUB SERPL-MCNC: 0.3 MG/DL (ref 0–1)
BUN BLDV-MCNC: 14 MG/DL (ref 7–20)
BUN BLDV-MCNC: 14 MG/DL (ref 7–20)
CALCIUM SERPL-MCNC: 8.8 MG/DL (ref 8.3–10.6)
CALCIUM SERPL-MCNC: 9 MG/DL (ref 8.3–10.6)
CARBOXYHEMOGLOBIN: 1.8 % (ref 0–1.5)
CARBOXYHEMOGLOBIN: 1.9 % (ref 0–1.5)
CARBOXYHEMOGLOBIN: 1.9 % (ref 0–1.5)
CHLORIDE BLD-SCNC: 108 MMOL/L (ref 99–110)
CHLORIDE BLD-SCNC: 109 MMOL/L (ref 99–110)
CO2: 23 MMOL/L (ref 21–32)
CO2: 25 MMOL/L (ref 21–32)
CREAT SERPL-MCNC: 0.7 MG/DL (ref 0.6–1.2)
CREAT SERPL-MCNC: 0.9 MG/DL (ref 0.6–1.2)
D DIMER: 1938 NG/ML DDU (ref 0–229)
EKG ATRIAL RATE: 138 BPM
EKG DIAGNOSIS: NORMAL
EKG P AXIS: 85 DEGREES
EKG P-R INTERVAL: 132 MS
EKG Q-T INTERVAL: 282 MS
EKG QRS DURATION: 84 MS
EKG QTC CALCULATION (BAZETT): 427 MS
EKG R AXIS: -56 DEGREES
EKG T AXIS: 88 DEGREES
EKG VENTRICULAR RATE: 138 BPM
EOSINOPHILS ABSOLUTE: 0 K/UL (ref 0–0.6)
EOSINOPHILS ABSOLUTE: 0 K/UL (ref 0–0.6)
EOSINOPHILS RELATIVE PERCENT: 0.2 %
EOSINOPHILS RELATIVE PERCENT: 0.4 %
GFR AFRICAN AMERICAN: >60
GFR AFRICAN AMERICAN: >60
GFR NON-AFRICAN AMERICAN: >60
GFR NON-AFRICAN AMERICAN: >60
GLOBULIN: 2.5 G/DL
GLOBULIN: 3 G/DL
GLUCOSE BLD-MCNC: 101 MG/DL (ref 70–99)
GLUCOSE BLD-MCNC: 111 MG/DL (ref 70–99)
GLUCOSE BLD-MCNC: 272 MG/DL (ref 70–99)
GLUCOSE BLD-MCNC: 87 MG/DL (ref 70–99)
HCO3 VENOUS: 22 MMOL/L (ref 23–29)
HCO3 VENOUS: 22.2 MMOL/L (ref 23–29)
HCO3 VENOUS: 26.8 MMOL/L (ref 23–29)
HCT VFR BLD CALC: 34.6 % (ref 36–48)
HCT VFR BLD CALC: 42.4 % (ref 36–48)
HEMOGLOBIN: 11 G/DL (ref 12–16)
HEMOGLOBIN: 13.1 G/DL (ref 12–16)
LACTIC ACID, SEPSIS: 1.5 MMOL/L (ref 0.4–1.9)
LYMPHOCYTES ABSOLUTE: 1.3 K/UL (ref 1–5.1)
LYMPHOCYTES ABSOLUTE: 2.1 K/UL (ref 1–5.1)
LYMPHOCYTES RELATIVE PERCENT: 11.2 %
LYMPHOCYTES RELATIVE PERCENT: 12.4 %
MCH RBC QN AUTO: 27.9 PG (ref 26–34)
MCH RBC QN AUTO: 28 PG (ref 26–34)
MCHC RBC AUTO-ENTMCNC: 31 G/DL (ref 31–36)
MCHC RBC AUTO-ENTMCNC: 31.7 G/DL (ref 31–36)
MCV RBC AUTO: 88.4 FL (ref 80–100)
MCV RBC AUTO: 90.1 FL (ref 80–100)
METHEMOGLOBIN VENOUS: 0.1 %
METHEMOGLOBIN VENOUS: 0.1 %
METHEMOGLOBIN VENOUS: 0.3 %
MONOCYTES ABSOLUTE: 0.3 K/UL (ref 0–1.3)
MONOCYTES ABSOLUTE: 0.6 K/UL (ref 0–1.3)
MONOCYTES RELATIVE PERCENT: 1.7 %
MONOCYTES RELATIVE PERCENT: 5.5 %
NEUTROPHILS ABSOLUTE: 15.9 K/UL (ref 1.7–7.7)
NEUTROPHILS ABSOLUTE: 8.4 K/UL (ref 1.7–7.7)
NEUTROPHILS RELATIVE PERCENT: 81.5 %
NEUTROPHILS RELATIVE PERCENT: 86.6 %
O2 CONTENT, VEN: 19 VOL %
O2 CONTENT, VEN: 19 VOL %
O2 SAT, VEN: 56 %
O2 SAT, VEN: 93 %
O2 SAT, VEN: 94 %
O2 THERAPY: ABNORMAL
PCO2, VEN: 58.8 MMHG (ref 40–50)
PCO2, VEN: 69.3 MMHG (ref 40–50)
PCO2, VEN: 71.1 MMHG (ref 40–50)
PDW BLD-RTO: 19.9 % (ref 12.4–15.4)
PDW BLD-RTO: 20.7 % (ref 12.4–15.4)
PERFORMED ON: ABNORMAL
PERFORMED ON: ABNORMAL
PH VENOUS: 7.11 (ref 7.35–7.45)
PH VENOUS: 7.2 (ref 7.35–7.45)
PH VENOUS: 7.21 (ref 7.35–7.45)
PLATELET # BLD: 220 K/UL (ref 135–450)
PLATELET # BLD: 320 K/UL (ref 135–450)
PMV BLD AUTO: 8.1 FL (ref 5–10.5)
PMV BLD AUTO: 8.2 FL (ref 5–10.5)
PO2, VEN: 36.3 MMHG (ref 25–40)
PO2, VEN: 79.8 MMHG (ref 25–40)
PO2, VEN: 95.8 MMHG (ref 25–40)
POTASSIUM REFLEX MAGNESIUM: 4.7 MMOL/L (ref 3.5–5.1)
POTASSIUM SERPL-SCNC: 3.3 MMOL/L (ref 3.5–5.1)
PRO-BNP: 8223 PG/ML (ref 0–449)
PROCALCITONIN: 0.81 NG/ML (ref 0–0.15)
RBC # BLD: 3.91 M/UL (ref 4–5.2)
RBC # BLD: 4.7 M/UL (ref 4–5.2)
SARS-COV-2, NAAT: NOT DETECTED
SODIUM BLD-SCNC: 140 MMOL/L (ref 136–145)
SODIUM BLD-SCNC: 142 MMOL/L (ref 136–145)
TCO2 CALC VENOUS: 24 MMOL/L
TCO2 CALC VENOUS: 24 MMOL/L
TCO2 CALC VENOUS: 29 MMOL/L
TOTAL PROTEIN: 5.5 G/DL (ref 6.4–8.2)
TOTAL PROTEIN: 6.5 G/DL (ref 6.4–8.2)
TROPONIN: 0.04 NG/ML
TROPONIN: 0.06 NG/ML
WBC # BLD: 10.3 K/UL (ref 4–11)
WBC # BLD: 18.4 K/UL (ref 4–11)

## 2021-09-23 PROCEDURE — 2580000003 HC RX 258: Performed by: STUDENT IN AN ORGANIZED HEALTH CARE EDUCATION/TRAINING PROGRAM

## 2021-09-23 PROCEDURE — 6370000000 HC RX 637 (ALT 250 FOR IP): Performed by: INTERNAL MEDICINE

## 2021-09-23 PROCEDURE — 96361 HYDRATE IV INFUSION ADD-ON: CPT

## 2021-09-23 PROCEDURE — 94640 AIRWAY INHALATION TREATMENT: CPT

## 2021-09-23 PROCEDURE — 6370000000 HC RX 637 (ALT 250 FOR IP): Performed by: STUDENT IN AN ORGANIZED HEALTH CARE EDUCATION/TRAINING PROGRAM

## 2021-09-23 PROCEDURE — 96365 THER/PROPH/DIAG IV INF INIT: CPT

## 2021-09-23 PROCEDURE — 2580000003 HC RX 258: Performed by: INTERNAL MEDICINE

## 2021-09-23 PROCEDURE — 6370000000 HC RX 637 (ALT 250 FOR IP): Performed by: HOSPITALIST

## 2021-09-23 PROCEDURE — 94660 CPAP INITIATION&MGMT: CPT

## 2021-09-23 PROCEDURE — 71260 CT THORAX DX C+: CPT

## 2021-09-23 PROCEDURE — 85025 COMPLETE CBC W/AUTO DIFF WBC: CPT

## 2021-09-23 PROCEDURE — 2700000000 HC OXYGEN THERAPY PER DAY

## 2021-09-23 PROCEDURE — 99285 EMERGENCY DEPT VISIT HI MDM: CPT

## 2021-09-23 PROCEDURE — 6360000004 HC RX CONTRAST MEDICATION: Performed by: STUDENT IN AN ORGANIZED HEALTH CARE EDUCATION/TRAINING PROGRAM

## 2021-09-23 PROCEDURE — 6360000002 HC RX W HCPCS: Performed by: STUDENT IN AN ORGANIZED HEALTH CARE EDUCATION/TRAINING PROGRAM

## 2021-09-23 PROCEDURE — 82803 BLOOD GASES ANY COMBINATION: CPT

## 2021-09-23 PROCEDURE — 94761 N-INVAS EAR/PLS OXIMETRY MLT: CPT

## 2021-09-23 PROCEDURE — 99239 HOSP IP/OBS DSCHRG MGMT >30: CPT | Performed by: INTERNAL MEDICINE

## 2021-09-23 PROCEDURE — 87635 SARS-COV-2 COVID-19 AMP PRB: CPT

## 2021-09-23 PROCEDURE — 2000000000 HC ICU R&B

## 2021-09-23 PROCEDURE — 71045 X-RAY EXAM CHEST 1 VIEW: CPT

## 2021-09-23 PROCEDURE — 84484 ASSAY OF TROPONIN QUANT: CPT

## 2021-09-23 PROCEDURE — 83880 ASSAY OF NATRIURETIC PEPTIDE: CPT

## 2021-09-23 PROCEDURE — 83605 ASSAY OF LACTIC ACID: CPT

## 2021-09-23 PROCEDURE — 93005 ELECTROCARDIOGRAM TRACING: CPT | Performed by: STUDENT IN AN ORGANIZED HEALTH CARE EDUCATION/TRAINING PROGRAM

## 2021-09-23 PROCEDURE — 99233 SBSQ HOSP IP/OBS HIGH 50: CPT | Performed by: INTERNAL MEDICINE

## 2021-09-23 PROCEDURE — 6360000002 HC RX W HCPCS: Performed by: INTERNAL MEDICINE

## 2021-09-23 PROCEDURE — 85379 FIBRIN DEGRADATION QUANT: CPT

## 2021-09-23 PROCEDURE — 93010 ELECTROCARDIOGRAM REPORT: CPT | Performed by: INTERNAL MEDICINE

## 2021-09-23 PROCEDURE — 84145 PROCALCITONIN (PCT): CPT

## 2021-09-23 PROCEDURE — 99291 CRITICAL CARE FIRST HOUR: CPT | Performed by: INTERNAL MEDICINE

## 2021-09-23 PROCEDURE — 80053 COMPREHEN METABOLIC PANEL: CPT

## 2021-09-23 PROCEDURE — 36415 COLL VENOUS BLD VENIPUNCTURE: CPT

## 2021-09-23 PROCEDURE — C9113 INJ PANTOPRAZOLE SODIUM, VIA: HCPCS | Performed by: INTERNAL MEDICINE

## 2021-09-23 RX ORDER — POTASSIUM CHLORIDE 20 MEQ/1
20 TABLET, EXTENDED RELEASE ORAL ONCE
Status: COMPLETED | OUTPATIENT
Start: 2021-09-23 | End: 2021-09-23

## 2021-09-23 RX ORDER — IPRATROPIUM BROMIDE AND ALBUTEROL SULFATE 2.5; .5 MG/3ML; MG/3ML
1 SOLUTION RESPIRATORY (INHALATION)
Status: DISCONTINUED | OUTPATIENT
Start: 2021-09-23 | End: 2021-09-24 | Stop reason: SDUPTHER

## 2021-09-23 RX ORDER — 0.9 % SODIUM CHLORIDE 0.9 %
500 INTRAVENOUS SOLUTION INTRAVENOUS ONCE
Status: COMPLETED | OUTPATIENT
Start: 2021-09-23 | End: 2021-09-23

## 2021-09-23 RX ORDER — FUROSEMIDE 10 MG/ML
40 INJECTION INTRAMUSCULAR; INTRAVENOUS ONCE
Status: DISCONTINUED | OUTPATIENT
Start: 2021-09-23 | End: 2021-09-23

## 2021-09-23 RX ORDER — CEFEPIME HYDROCHLORIDE 2 G/1
2 INJECTION, POWDER, FOR SOLUTION INTRAVENOUS EVERY 12 HOURS
Qty: 12 EACH | Refills: 0 | Status: ON HOLD | OUTPATIENT
Start: 2021-09-23 | End: 2021-09-27 | Stop reason: HOSPADM

## 2021-09-23 RX ADMIN — SODIUM CHLORIDE, PRESERVATIVE FREE 5 ML: 5 INJECTION INTRAVENOUS at 09:51

## 2021-09-23 RX ADMIN — PREDNISONE 40 MG: 20 TABLET ORAL at 09:36

## 2021-09-23 RX ADMIN — CEFEPIME 2000 MG: 2 INJECTION, POWDER, FOR SOLUTION INTRAVENOUS at 09:49

## 2021-09-23 RX ADMIN — GUAIFENESIN 600 MG: 600 TABLET, EXTENDED RELEASE ORAL at 09:35

## 2021-09-23 RX ADMIN — DOCUSATE SODIUM 100 MG: 100 CAPSULE, LIQUID FILLED ORAL at 09:36

## 2021-09-23 RX ADMIN — IPRATROPIUM BROMIDE AND ALBUTEROL SULFATE 1 AMPULE: .5; 3 SOLUTION RESPIRATORY (INHALATION) at 07:19

## 2021-09-23 RX ADMIN — VANCOMYCIN HYDROCHLORIDE 500 MG: 500 INJECTION, POWDER, LYOPHILIZED, FOR SOLUTION INTRAVENOUS at 10:30

## 2021-09-23 RX ADMIN — METOPROLOL TARTRATE 25 MG: 25 TABLET, FILM COATED ORAL at 09:36

## 2021-09-23 RX ADMIN — FERROUS SULFATE TAB 325 MG (65 MG ELEMENTAL FE) 325 MG: 325 (65 FE) TAB at 09:36

## 2021-09-23 RX ADMIN — CEFEPIME 2000 MG: 2 INJECTION, POWDER, FOR SOLUTION INTRAVENOUS at 19:37

## 2021-09-23 RX ADMIN — FLUTICASONE PROPIONATE 2 SPRAY: 50 SPRAY, METERED NASAL at 09:39

## 2021-09-23 RX ADMIN — PANTOPRAZOLE SODIUM 40 MG: 40 INJECTION, POWDER, FOR SOLUTION INTRAVENOUS at 09:35

## 2021-09-23 RX ADMIN — IPRATROPIUM BROMIDE AND ALBUTEROL SULFATE 1 AMPULE: .5; 3 SOLUTION RESPIRATORY (INHALATION) at 22:58

## 2021-09-23 RX ADMIN — POTASSIUM CHLORIDE 20 MEQ: 1500 TABLET, EXTENDED RELEASE ORAL at 11:14

## 2021-09-23 RX ADMIN — SODIUM CHLORIDE 500 ML: 9 INJECTION, SOLUTION INTRAVENOUS at 16:35

## 2021-09-23 RX ADMIN — IOPAMIDOL 85 ML: 755 INJECTION, SOLUTION INTRAVENOUS at 20:35

## 2021-09-23 RX ADMIN — ROFLUMILAST 250 MCG: 500 TABLET ORAL at 09:35

## 2021-09-23 RX ADMIN — ONDANSETRON HYDROCHLORIDE 4 MG: 2 INJECTION, SOLUTION INTRAMUSCULAR; INTRAVENOUS at 01:03

## 2021-09-23 RX ADMIN — MICONAZOLE NITRATE: 2 OINTMENT TOPICAL at 09:39

## 2021-09-23 RX ADMIN — SERTRALINE HYDROCHLORIDE 50 MG: 50 TABLET ORAL at 09:36

## 2021-09-23 ASSESSMENT — PAIN SCALES - GENERAL
PAINLEVEL_OUTOF10: 0
PAINLEVEL_OUTOF10: 0

## 2021-09-23 NOTE — ED PROVIDER NOTES
[address] ED      CHIEF COMPLAINT  Respiratory distress      HISTORY OF PRESENT ILLNESS  Georgia Mistry is a [de-identified] y.o. female with a past medical history of hyperlipidemia, COPD, NSTEMI, coronary artery disease, cardiomyopathy, who presents to the ED for respiratory distress. Patient was discharged from the hospital earlier today after admission for respiratory failure. Patient was treated for end-stage COPD and pneumonia. She was discharged on 2 L of oxygen. She was on day 8 of 14 IV cefepime and vancomycin for HCAP. She does have a PICC line in place. Patient was brought in by EMS, they gave Solu-Medrol and 2 DuoNeb's. They stated that patient was in the 62s when they arrived. She arrived on a nonrebreather, initial saturation 86%. Denies history of blood clots or active malignancy. Patient denies unilateral leg swelling, hemoptysis, recent travel or surgery/immobilization, or OCP or other hormone use. They report that their most recent cardiac evaluation was: 8/29/21    Patient does not smoke. Old records reviewed:     Stress test 8/29  Summary    Normal LV function.   Calleen Cleveland is uniform isotope uptake at stress and rest. There is no evidence of    myocardial ischemia. No other complaints, modifying factors or associated symptoms. I have reviewed the following from the nursing documentation.     Past Medical History:   Diagnosis Date    [facility]     Asthma     COPD (chronic obstructive pulmonary disease) [facility] [facility]     GERD (gastroesophageal reflux disease) 9/9/2021    Hyperlipidemia     Hypertension     MRSA (methicillin resistant staph aureus) culture positive 09/22/2019    + resp cx    OA (osteoarthritis) 8/12/2014     Past Surgical History:   Procedure Laterality Date    BRONCHOSCOPY  09/08/2019    Dr. Marce Whaley - w/BAL    CARDIAC CATHETERIZATION  09/05/2019    Dr. Madina Poon N/A 2/24/2020 Stress :    Social Connections:     Frequency of Communication with Friends and Family:     Frequency of Social Gatherings with Friends and Family:     Attends Restorationism Services:     Active Member of Clubs or Organizations:     Attends Club or Organization Meetings:     Marital Status:    Intimate Partner Violence:     Fear of Current or Ex-Partner:     Emotionally Abused:     Physically Abused:     Sexually Abused:      Current Facility-Administered Medications   Medication Dose Route Frequency Provider Last Rate Last Admin    ipratropium-albuterol (DUONEB) nebulizer solution 1 ampule  1 ampule Inhalation Q4H WA Cordell Prader, MD   1 ampule at 09/23/21 2256    iopamidol (ISOVUE-370) 76 % injection 85 mL  85 mL IntraVENous ONCE PRN Cordell Prader, MD         Allergies   Allergen Reactions    Latex      Burning      Codeine      \"hallucinations\"       REVIEW OF SYSTEMS  All systems reviewed, pertinent positives per HPI otherwise noted to be negative. PHYSICAL EXAM  BP (!) 167/97   Pulse 140   Resp (!) 39   SpO2 86%    GENERAL APPEARANCE: Awake and alert. Cooperative. In respiratory distress. HENT: Normocephalic. Atraumatic. Mucous membranes are dry  NECK: Supple. Full range of motion of the neck without stiffness or pain. EYES: PERRL. EOM's grossly intact. HEART/CHEST: tachycardia, RR. No murmurs. Chest wall is not tender to palpation. LUNGS: Respirations labored, tachypnea. Coarse breath sounds bilaterally with wheezing. ABDOMEN: No tenderness. Soft. Non-distended. No masses. No organomegaly. No guarding or rebound. MUSCULOSKELETAL: No extremity edema. Compartments soft. No deformity. No tenderness in the extremities. All extremities neurovascularly intact. SKIN: Warm and dry. No acute rashes. NEUROLOGICAL: Alert and oriented. No gross facial drooping. Strength 5/5, sensation intact. PSYCHIATRIC: Normal mood and affect. LABS  I have reviewed all labs for this visit.    Results for orders placed or performed during the hospital encounter of 09/23/21   COVID-19, Rapid    Specimen: Nasopharyngeal Swab   Result Value Ref Range    SARS-CoV-2, NAAT Not Detected Not Detected   CBC Auto Differential   Result Value Ref Range    WBC 18.4 (H) 4.0 - 11.0 K/uL    RBC 4.70 4.00 - 5.20 M/uL    Hemoglobin 13.1 12.0 - 16.0 g/dL    Hematocrit 42.4 36.0 - 48.0 %    MCV 90.1 80.0 - 100.0 fL    MCH 27.9 26.0 - 34.0 pg    MCHC 31.0 31.0 - 36.0 g/dL    RDW 20.7 (H) 12.4 - 15.4 %    Platelets 411 941 - 329 K/uL    MPV 8.2 5.0 - 10.5 fL    Neutrophils % 86.6 %    Lymphocytes % 11.2 %    Monocytes % 1.7 %    Eosinophils % 0.2 %    Basophils % 0.3 %    Neutrophils Absolute 15.9 (H) 1.7 - 7.7 K/uL    Lymphocytes Absolute 2.1 1.0 - 5.1 K/uL    Monocytes Absolute 0.3 0.0 - 1.3 K/uL    Eosinophils Absolute 0.0 0.0 - 0.6 K/uL    Basophils Absolute 0.1 0.0 - 0.2 K/uL   Comprehensive Metabolic Panel w/ Reflex to MG   Result Value Ref Range    Sodium 140 136 - 145 mmol/L    Potassium reflex Magnesium 4.7 3.5 - 5.1 mmol/L    Chloride 108 99 - 110 mmol/L    CO2 23 21 - 32 mmol/L    Anion Gap 9 3 - 16    Glucose 272 (H) 70 - 99 mg/dL    BUN 14 7 - 20 mg/dL    CREATININE 0.9 0.6 - 1.2 mg/dL    GFR Non-African American >60 >60    GFR African American >60 >60    Calcium 9.0 8.3 - 10.6 mg/dL    Total Protein 6.5 6.4 - 8.2 g/dL    Albumin 3.5 3.4 - 5.0 g/dL    Albumin/Globulin Ratio 1.2 1.1 - 2.2    Total Bilirubin 0.3 0.0 - 1.0 mg/dL    Alkaline Phosphatase 98 40 - 129 U/L    ALT 9 (L) 10 - 40 U/L    AST 15 15 - 37 U/L    Globulin 3.0 g/dL   Troponin   Result Value Ref Range    Troponin 0.04 (H) <0.01 ng/mL   Brain Natriuretic Peptide   Result Value Ref Range    Pro-BNP 8,223 (H) 0 - 449 pg/mL   Blood Gas, Venous   Result Value Ref Range    pH, Zach 7.108 (LL) 7.350 - 7.450    pCO2, Zach 71.1 (H) 40.0 - 50.0 mmHg    pO2, Zach 95.8 (H) 25 - 40 mmHg    HCO3, Venous 22.0 (L) 23.0 - 29.0 mmol/L    Base Excess, Zach -8.8 (L) -3.0 - 3.0 mmol/L    O2 Sat, Zach 94 Not Established %    Carboxyhemoglobin 1.8 (H) 0.0 - 1.5 %    MetHgb, Zach 0.1 <1.5 %    TC02 (Calc), Zach 24 Not Established mmol/L    O2 Content, Zach 19 Not Established VOL %    O2 Therapy Unknown    Lactate, Sepsis   Result Value Ref Range    Lactic Acid, Sepsis 1.5 0.4 - 1.9 mmol/L   D-dimer, quantitative   Result Value Ref Range    D-Dimer, Quant 1938 (H) 0 - 229 ng/mL DDU   Blood Gas, Venous   Result Value Ref Range    pH, Zach 7.195 (LL) 7.350 - 7.450    pCO2, Zach 58.8 (H) 40.0 - 50.0 mmHg    pO2, Zach 79.8 (H) 25 - 40 mmHg    HCO3, Venous 22.2 (L) 23.0 - 29.0 mmol/L    Base Excess, Zach -6.7 (L) -3.0 - 3.0 mmol/L    O2 Sat, Zach 93 Not Established %    Carboxyhemoglobin 1.9 (H) 0.0 - 1.5 %    MetHgb, Zach 0.1 <1.5 %    TC02 (Calc), Zach 24 Not Established mmol/L    O2 Content, Zach 19 Not Established VOL %    O2 Therapy Unknown    Blood Gas, Venous   Result Value Ref Range    pH, Zach 7.206 (L) 7.350 - 7.450    pCO2, Zach 69.3 (H) 40.0 - 50.0 mmHg    pO2, Zach 36.3 25 - 40 mmHg    HCO3, Venous 26.8 23.0 - 29.0 mmol/L    Base Excess, Zach -2.5 -3.0 - 3.0 mmol/L    O2 Sat, Zach 56 Not Established %    Carboxyhemoglobin 1.9 (H) 0.0 - 1.5 %    MetHgb, Zach 0.3 <1.5 %    TC02 (Calc), Zach 29 Not Established mmol/L    O2 Therapy Unknown    Troponin   Result Value Ref Range    Troponin 0.06 (H) <0.01 ng/mL   Procalcitonin   Result Value Ref Range    Procalcitonin 0.81 (H) 0.00 - 0.15 ng/mL   EKG 12 Lead   Result Value Ref Range    Ventricular Rate 138 BPM    Atrial Rate 138 BPM    P-R Interval 132 ms    QRS Duration 84 ms    Q-T Interval 282 ms    QTc Calculation (Bazett) 427 ms    P Axis 85 degrees    R Axis -56 degrees    T Axis 88 degrees    Diagnosis       Sinus tachycardiaPremature atrial complexesPossible Left atrial enlargementLeft axis deviationSeptal infarct , age undeterminedAbnormal ECGNo previous ECGs availableConfirmed by TOR HERNANDEZ MD ([id number]) on 9/23/2021 5:45:24 PM       ECG  The Ekg range)   0.9 % sodium chloride bolus (0 mLs IntraVENous Stopped 9/23/21 1840)   cefepime (MAXIPIME) 2000 mg IVPB minibag (0 mg IntraVENous Stopped 9/23/21 2020)   iopamidol (ISOVUE-370) 76 % injection 85 mL (85 mLs IntraVENous Given 9/23/21 2035)          EKG showed significant tachycardia, no evidence of acute ischemia. Troponin was elevated to 0.04, down trended from previous. Patient had had recent reassuring stress test during previous admission. At this time, I have lower suspicion for ACS. BNP was elevated to 8223, no evidence of fluid overload on exam.  Imaging shows evidence of edema. Lasix ordered, however then patient's blood pressure was less than 104 systolic, discussed with inpatient team, they recommended holding Lasix for now. Do suspect that CHF exacerbation could play a role in patient's current symptoms. Chest x-ray showed evidence of opacities, pneumonia versus pulmonary edema in the differential.  Patient has been treated for pneumonia, currently receiving vancomycin and cefepime. Vancomycin is dosed daily, cefepime dosed every 12 hours, patient given a dose of cefepime for continued treatment of pneumonia. Patient does have significant leukocytosis to 18.4. Patient met SIRS criteria, she was given antibiotics, patient was given judicious fluids in the setting of concern for CHF exacerbation. Lactate within normal limits, lower suspicion for sepsis. Patient's heart rate improved 500 cc bolus. Symptoms consistent with COPD exacerbation. Blood gas showed acidemia and hypercarbia. Patient received Solu-Medrol and duo nebs by EMS, given further DuoNeb and placed on BiPAP in the emergency department, with improvement of symptoms. Patient is not hypoxic while on BiPAP. Repeat blood gas showed improving hypercarbia, patient was taken off of BiPAP in order to go to CT and was on a nonrebreather, repeat blood gas after this showed worsening again of hypercarbia.   Patient placed back on BiPAP. Patient will require admission to the ICU. D-dimer was obtained, did show evidence of significant elevation. CT PE study obtained and did not show evidence of pulmonary embolism. No significant electrolyte abnormalities or evidence of kidney dysfunction. Liver function testing unremarkable. No anemia or thrombocytopenia. Based on results of work-up, I am concerned for COPD exacerbation, CHF exacerbation, and pneumonia. At this time, do feel the patient requires admission for further work-up and management. Discussed the patient with hospital team, Dr Sly Renner, patient to be admitted to ICU. I spent a total of 35 minutes of critical care time in obtaining history, performing a physical exam, bedside monitoring of interventions, collecting and interpreting tests and discussion with consultants but not including time spent performing procedures. Clinical Concern respiratory failure, pneumonia, COPD exacerbation, heart failure    Intervention BiPAP, antibiotics, DuoNeb (patient already received steroids), IVF          CLINICAL IMPRESSION  1. Acute respiratory failure with hypoxia and hypercapnia (HCC)    2. Pneumonia due to infectious organism, unspecified laterality, unspecified part of lung    3. COPD exacerbation ([facility] [address]    4. Congestive heart failure, unspecified HF chronicity, unspecified heart failure type (HCC)        Blood pressure 129/76, pulse 94, temperature 97 °F (36.1 °C), temperature source Axillary, resp. rate 21, SpO2 99 %, not currently breastfeeding. Zachery Hooks was admitted in stable condition. DISCLAIMER: This chart was created using Dragon dictation software. Efforts were made by me to ensure accuracy, however some errors may be present due to limitations of this technology and occasionally words are not transcribed correctly.               Baron Flower MD  09/24/21 1674

## 2021-09-23 NOTE — PROGRESS NOTES
Pulmonary Progress Note  CC: Shortness of breath, altered mental status    Subjective:    Feels really good, used the astral last night and really liked it     IV line:   9/20/21 R midline     EXAM:   /76   Pulse 92   Temp 97.9 °F (36.6 °C) (Axillary)   Resp 20   Ht 5' 2\" (1.575 m)   Wt 117 lb (53.1 kg)   SpO2 91%   BMI 21.40 kg/m²  on 2 L  General: NAD, looking better   Eyes: PERRL. No sclera icterus. No conjunctival injection. ENT: No discharge. Pharynx clear. Neck: Trachea midline. Normal thyroid. Right IJ in place. Resp: Tachypnea with minimal accessory muscle use. No crackles. No wheezing . No dullness on percussion. CV: Regular rate. Regular rhythm. No mumur or rub. No edema. Right UE edema. GI: Non-tender. Non-distended. Normal bowel sounds. No hernia. Skin: Warm and dry. No nodule on exposed extremities. No rash on exposed extremities. Bilateral arms weeping. Lymph: No cervical LAD. No supraclavicular LAD. M/S: No cyanosis. No joint deformity. No clubbing. Neuro: Awake. Conversant   Psych: Oriented to person, place, time. + anxiety. No agitation.      Scheduled Meds:   vancomycin  500 mg IntraVENous Q24H    predniSONE  40 mg Oral Daily    fluticasone  2 spray Each Nostril Daily    miconazole nitrate   Topical BID    atorvastatin  20 mg Oral Nightly    docusate sodium  100 mg Oral BID    ferrous sulfate  325 mg Oral Daily with breakfast    metoprolol tartrate  25 mg Oral BID    pantoprazole  40 mg IntraVENous Daily    Roflumilast  250 mcg Oral Daily    sertraline  50 mg Oral Daily    cefepime  2,000 mg IntraVENous Q12H    sodium chloride flush  5-40 mL IntraVENous 2 times per day    guaiFENesin  600 mg Oral BID    insulin lispro  0-6 Units SubCUTAneous TID WC    insulin lispro  0-3 Units SubCUTAneous Nightly    sodium chloride flush  5-40 mL IntraVENous 2 times per day    ipratropium-albuterol  1 ampule Inhalation Q4H While awake     Continuous Infusions:   sodium chloride      sodium chloride      sodium chloride      dextrose      sodium chloride 25 mL (09/22/21 1027)     PRN Meds:  albuterol-ipratropium, sodium chloride, sodium chloride, bisacodyl, sodium chloride flush, sodium chloride, acetaminophen **OR** acetaminophen, albuterol, glucose, dextrose, glucagon (rDNA), dextrose, sodium chloride flush, sodium chloride, ondansetron    Labs:  CBC:   Recent Labs     09/21/21  0605 09/22/21  0734 09/23/21  0529   WBC 10.1 8.2 10.3   HGB 10.9* 11.0* 11.0*   HCT 34.8* 34.6* 34.6*   MCV 88.3 87.8 88.4    215 220     BMP:   Recent Labs     09/21/21  0605 09/22/21  0734 09/23/21  0529    143 142   K 3.3* 3.4* 3.3*    109 109   CO2 25 26 25   BUN 10 12 14   CREATININE 0.9 0.7 0.7     Cultures:  9/16/2021 SARS-CoV-2  negative  9/16/2021 influenza A & B negative  9/15/2021 blood cultures NG  9/15/2021 urine culture negative    Imaging:  Head CT 9/15/2021  No acute intracranial abnormality  Partial opacification of the bilateral middle ear cavities which could  indicate otitis media. Small air-fluid levels are noted in the bilateral maxillary and sphenoid  sinuses, correlate clinically for evidence of acute sinusitis. UE Doppler 9/17/2021  Evidence of acute totally occluding superficial venous thrombosis involving the right basilic vein at the antecubital fossa.     CXR 9/20/2021  Unchanged, CVC in place    ASSESSMENT:  · Acute on chronic respiratory failure with hypoxemia & refractory hypercapnia; life-threatening respiratory failure, resolved  · Recently d/c'd on home BiPAP/AVAPS after seeing Angel Vásquez -- an astral home ventilator has already been set up  · Acute COPD exacerbation, resolved  · Healthcare acquired pneumonia, resolved  · Hypotensive/septic shock, initially fluid responsive - resolved  · Mental status change, resolved  · Right upper extremity superficial thrombus 9/17/21  · Persistently elevated troponin, 0.05 to 0.07   · Acute on chronic anemia, s/p 2 units 9/18/21  · CAD  · S/p COVID-19 vaccination    PLAN:  · NIPPV HS and PRN work of breathing   · Antibiotics: Cefepime & Vancomycin D#8/14; monitoring of vanc levels to prevent toxicity. · Transfused 2 units PRBC 9/18/21  · Long prednisone taper over 3 weeks, to final dose of 10 mg, she should be seen on 10 mg   · Full code  · Discharge planning okay with me on home ventilator for recurrent life threatening acute on chronic hypercapnic and hypoxic respiratory failure.   · Pulmonary f/u is with [facility], Dr. Troy Husain

## 2021-09-23 NOTE — CARE COORDINATION
DISCHARGE ORDER  Date/Time 2021 12:12 PM  Completed by: Paty Love RN, Case Management    Patient Name: Liliane Cesar      : 1940  Admitting Diagnosis: Acute respiratory failure with hypoxia ([address] [J96.01]  Acute respiratory failure with hypoxia and hypercapnia ([address] [J96.01, J96.02]  Pneumonia due to infectious organism, unspecified laterality, unspecified part of lung [J18.9]      Admit order Date and Status:2021  (verify MD's last order for status of admission)      Noted discharge order. If applicable PT/OT recommendation at Discharge: SNF  DME recommendation by PT/OT:n/a  Confirmed discharge plan  (pt): Yes  with whom_________pt______  If pt confirmed DC plan does family need to be contacted by CM Yes if yes who___Juan ([phone])___  Discharge Plan: Reviewed chart. Role of discharge planner explained and patient and Tamela Morales (daughter) verbalized understanding. Discharge order is noted. Pt is being d/c'd to home today. Pt states she will have 24/7 assist at home. Pt will being d/cing to home via ambulance transport and is aware that insurance may not cover all of it ad still wanted the ambulance transport. She did not have preference in ambulance transport [facility] is picking pt up at 1300 to transport to home at [place] [address], [address] [place] [address] with one step ,a s Tamela Carmen confirmed this. Pt is going home with IV abx of Cefempime BID and vanc q 24 hours x 7 days. Pt has a midline. Pt has had both morning doses at 0800am. CM re-informed Juan of the price of $170/week for cefepime and $92/week for vanc and she verbalized understanding. Per Dr. Davida Correia, pt can skip evening dose of cefepime tonight, for [facility] with [facility] tomorrow. [facility] is delivering meds today to the home. Shea Bess with [facility] is aware of d/c today. Pt and Juan agree with PT/OT/SN/HA/and Palliative Care with pt being a Full Code.  Shea Bess asked for CM to call [facility] (as [place] and pt to [phone]. ADDISON asked [facility] to call CM back if she wilcox snot receive it. Allision verbalized understanding. ADDISON called Muna Zamora with [facility] and she verbalized understanding. .      No further needs needed. Addendum 9/23/2021 1505; [facility] with Dr. Caesar Lovell office confirmed she received the AVS/LUPE and she does have any questions.

## 2021-09-23 NOTE — PROGRESS NOTES
chloride      sodium chloride      dextrose      sodium chloride 25 mL (21 1027)       PRN Meds:  albuterol-ipratropium, sodium chloride, sodium chloride, bisacodyl, sodium chloride flush, sodium chloride, acetaminophen **OR** acetaminophen, albuterol, glucose, dextrose, glucagon (rDNA), dextrose, sodium chloride flush, sodium chloride, ondansetron                  Objective:     Temp  Av.9 °F (36.6 °C)  Min: 97.1 °F (36.2 °C)  Max: 98.4 °F (36.9 °C)  Pulse  Av.4  Min: 85  Max: 116  BP  Min: 116/70  Max: 142/78  SpO2  Av.3 %  Min: 91 %  Max: 98 %  Patient Vitals for the past 4 hrs:   Weight   21 0526 117 lb (53.1 kg)         Intake/Output Summary (Last 24 hours) at 2021 0653  Last data filed at 2021 6384  Gross per 24 hour   Intake 420 ml   Output 850 ml   Net -430 ml       Physical Exam:      General:  Elderly female up in bed,  Awake, alert and oriented. Appears to be not in any distress  Mucous Membranes:  Pink , anicteric  Neck: No JVD, no carotid bruit, no thyromegaly  Chest:  Improving justa air entry ,no  accessory muscle use  Occasional wheeze   Cardiovascular:  RRR S1S2 heard, no murmurs or gallops  Abdomen:  Soft, undistended, non tender, no organomegaly, BS present  Extremities: scattered ecchymoses  No edema or cyanosis. Distal pulses well felt  Neurological : grossly normal           Lab Data:  CBC:   Recent Labs     21  0605 21  0734 21  0529   WBC 10.1 8.2 10.3   RBC 3.94* 3.95* 3.91*   HGB 10.9* 11.0* 11.0*   HCT 34.8* 34.6* 34.6*   MCV 88.3 87.8 88.4   RDW 20.1* 20.2* 19.9*    215 220     BMP:   Recent Labs     21  0605 21  0734 21  0529    143 142   K 3.3* 3.4* 3.3*    109 109   CO2 25 26 25   BUN 10 12 14   CREATININE 0.9 0.7 0.7     CARDIAC ENZYMES:   No results for input(s): CKMB, CKMBINDEX, TROPONINI in the last 72 hours.     Invalid input(s): CKTOTAL;3  FASTING LIPID PANEL:  Lab Results   Component Final Result   Findings compatible with pulmonary interstitial edema. Given the normal   heart size, consider both cardiogenic and noncardiogenic etiologies,   including atypical infections. EKG-  Sinus rhythm with Premature supraventricular complexesWhen     Assessment & Plan:         Acute on chronic hypoxic and hypercarbic respiratory failure  With end-stage COPD with acute exacerbation  3rd admission for same this month alone. Improved with bipap  Currently on 2 L of oxygen. Treated with  IV Solu-Medrol and IV cefepime and vancomycin day 8/14 for HCAP  Mucinex  Daliresp  Pulmonary consulted   pt and family insisted on full code. pt refusing SNF, might be an issue at [facility] as she lives alone  Family wished for palliative care and met with hospice but decided against it as pt did not want to change code status         Improved overall resp status and back to baseline  Pt was started on newly approved home bipap and tolerating well . Hope this will keep her out of the hospital          Healthcare associate pneumonia  Elevated procalcitonin     IV cefepime and vancomycin for presumable gram-negative and/are MRSA pneumonia. Sputum cultures pending  Improving resp status slowly   Given recurrent admissions, pulm recommends 14 days of cefepime and vanc  Placed picc and will send her home on IV abx        Metabolic encephalopathy - sec to hypercarbia   Resolved       Acute on Chronic anemia  Hx of lower GI bleed. pRBC 2 units tx 9/18  Hgb up to 10.9 and no signs of active bleeding. I am concerned she is not a candidate for endoscopy with end stage COPD. Monitor Hgb        CAD/HTN- continued home BB,   Clopidogrel stopped due to worsening anemia. atorvastatin  Mildly elevated troponin likely type II demand based ischemia       Mood disorder continued home sertraline       GERD- continued home PPI      Right upper extremity swelling. Obtain venous Doppler   + Rt superficial venous thrombus.    Not a candidate for [facility] if this were to progress - due to severe anemia. DVT Prophylaxis: SCD  Diet: Dysphagia. Code Status: Full Code     PT/OT Eval Status:p     Dispo - cc.  Very poor prognosis.          Brian Schmitz MD, 9/23/2021 6:53 AM

## 2021-09-23 NOTE — DISCHARGE SUMMARY
Name:  Alicia Rojas  Room:  /0344-65  MRN:    [id number]    Discharge Summary      This discharge summary is in conjunction with a complete physical exam done on the day of discharge. Attending Physician: Dr. Luis Trimble  Discharging Physician: Dr. Cynthia Thayer: 9/15/2021  Discharge:   9/23/2021    HPI:  [de-identified] y.o. female who presented to ER  with acute shortness of breath and change in mental status. Saturation was reported 58% at the extended care facility. Was just recently discharged to extended care facility from [facility]. 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 Remains full code at this time POA is patient's sister. Patient's niece is at bedside  Patient is somewhat lethargic with BiPAP on her. Diagnoses this Admission and Hospital Course     Acute on chronic hypoxic and hypercarbic respiratory failure  With end-stage COPD with acute exacerbation  3rd admission for same this month alone. Improved with bipap  Currently on 2 L of oxygen. Treated with  IV Solu-Medrol and IV cefepime and vancomycin day 8/14 for HCAP  Mucinex  Daliresp  Pulmonary consulted   pt and family insisted on full code. pt refusing SNF, might be an issue at [place] as [place] alone  Family wished for palliative care and met with hospice but decided against it as pt did not wanted to change code status        Improved overall resp status and back to baseline  Pt was started on newly approved home bipap and tolerating well . Hope this will keep her out of the hospital            Healthcare associate pneumonia  Elevated procalcitonin      IV cefepime and vancomycin for presumable gram-negative and/are MRSA pneumonia.   Sputum cultures pending  Improving resp status slowly   Given recurrent admissions, pulm recommends 14 days of cefepime and vanc  Placed picc and will send her home on IV abx         Metabolic encephalopathy - sec to hypercarbia   Resolved        Acute on Chronic anemia  Hx of lower GI bleed. pRBC 2 units tx 9/18  Hgb up to 10.9 and no signs of active bleeding. I am concerned she is not a candidate for endoscopy with end stage COPD. Monitored Hgb         CAD/HTN- continued home BB,   Clopidogrel stopped due to worsening anemia. atorvastatin  Mildly elevated troponin likely type II demand based ischemia        Mood disorder continued home sertraline        GERD- continued home PPI        Right upper extremity swelling. Obtain venous Doppler   + Rt superficial venous thrombus. Not a candidate for [facility] if this were to progress - due to severe anemia.         DVT Prophylaxis: SCD     Dispo -D/c home. Very poor prognosis.      Procedures (Please Review Full Report for Details)  Central line placement  Modified barium swallow  Midline cath placement    Consults    Pulmonologist       Physical Exam at Discharge:    BP (!) 145/76   Pulse 102   Temp 98.1 °F (36.7 °C) (Oral)   Resp 20   Ht 5' 2\" (1.575 m)   Wt 117 lb (53.1 kg)   SpO2 97%   BMI 21.40 kg/m²     See today's progress note    CBC: Recent Labs     09/21/21  0605 09/22/21  0734 09/23/21  0529   WBC 10.1 8.2 10.3   HGB 10.9* 11.0* 11.0*   HCT 34.8* 34.6* 34.6*   MCV 88.3 87.8 88.4    215 220     BMP:   Recent Labs     09/21/21  0605 09/22/21  0734 09/23/21  0529    143 142   K 3.3* 3.4* 3.3*    109 109   CO2 25 26 25   BUN 10 12 14   CREATININE 0.9 0.7 0.7     LIVER PROFILE:   Recent Labs     09/21/21  0605 09/22/21  0734 09/23/21  0529   AST 14* 9* 11*   ALT 8* 6* 7*   BILITOT <0.2 0.3 0.3   ALKPHOS 69 70 69       U/A:    Lab Results   Component Value Date    NITRITE neg 05/07/2013    COLORU Yellow 09/17/2021    WBCUA 10-20 09/17/2021    RBCUA 3-4 09/17/2021    BACTERIA 1+ 09/17/2021    CLARITYU Clear 09/17/2021    SPECGRAV 1.015 09/17/2021    LEUKOCYTESUR SMALL 09/17/2021    BLOODU MODERATE 09/17/2021    GLUCOSEU Negative 09/17/2021    AMORPHOUS 3+ 09/18/2019       ABG    Lab Results   Component Value Date    REZ1CLA 26.6 09/17/2021    BEART 2.0 09/17/2021    F7RLZQIM 98.4 09/17/2021    PHART 7.422 09/17/2021    THGBART 14.0 12/20/2012    YQM9QSE 41.8 09/17/2021    PO2ART 120.5 09/17/2021    NEP9RKF 27.9 09/17/2021         CULTURES  Blood: NGTD  Urine: NGTD  COVID: Not detected    RADIOLOGY  IR MIDLINE CATH   Final Result   Successful placement of midline catheter. XR CHEST PORTABLE   Final Result   Right IJ catheter with tip in the SVC. Small bilateral pleural effusions. Mild pulmonary edema. XR CHEST PORTABLE   Final Result   Mild-to-moderate pulmonary vascular congestion worse since yesterday. Mild   left retrocardiac atelectasis or less likely pneumonia. Mild-to-moderate   underlying bullous changes. XR CHEST PORTABLE   Final Result   In this patient with probable changes of COPD, there are stable diffuse   reticular opacities which may represent mild pulmonary edema. Underlying   pneumonitis is not excluded. VL Extremity Venous Right   Final Result      XR CHEST PORTABLE   Final Result   No significant early change in diffuse bilateral opacity which can reflect   pulmonary edema or atypical infection. XR CHEST PORTABLE   Final Result   Status post right IJ catheter placement in good position with no pneumothorax. Slowly resolving pulmonary congestion. Chronic obstructive lung changes with slowly resolving perihilar and   bibasilar opacities. CT HEAD WO CONTRAST   Final Result   No acute intracranial abnormality. Chronic and age related changes including age-appropriate cerebral volume   loss and scattered nonspecific white matter hypodensities which are likely   the sequela of chronic small vessel ischemic disease. Partial opacification of the bilateral middle ear cavities which could   indicate otitis media.       Small air-fluid levels are noted in the bilateral maxillary and sphenoid   sinuses, correlate clinically for evidence of acute sinusitis. XR CHEST PORTABLE   Final Result   Findings compatible with pulmonary interstitial edema. Given the normal   heart size, consider both cardiogenic and noncardiogenic etiologies,   including atypical infections. Discharge Medications     Medication List      START taking these medications    ceFEPIme 2 g injection  Commonly known as: MAXIPIME  Infuse 2,000 mg intravenously every 12 hours for 6 days     vancomycin 1000 MG injection  Commonly known as: VANCOCIN  Infuse 500 mg intravenously every 24 hours for 6 days        CONTINUE taking these medications    acetaminophen 500 MG tablet  Commonly known as: TYLENOL     albuterol sulfate  (90 Base) MCG/ACT inhaler  Commonly known as: Proventil HFA  Inhale 2 puffs into the lungs every 6 hours as needed for Wheezing or Shortness of Breath.      atorvastatin 20 MG tablet  Commonly known as: LIPITOR  Take 1 tablet by mouth nightly     budesonide 0.5 MG/2ML nebulizer suspension  Commonly known as: PULMICORT  Take 2 mLs by nebulization 2 times daily     busPIRone 10 MG tablet  Commonly known as: BUSPAR     clopidogrel 75 MG tablet  Commonly known as: PLAVIX  Take 1 tablet by mouth daily     Daliresp 250 MCG tablet  Generic drug: Roflumilast     docusate 100 MG Caps  Commonly known as: COLACE, DULCOLAX  Take 100 mg by mouth 2 times daily     ferrous sulfate 325 (65 Fe) MG tablet  Commonly known as: IRON 325     fluticasone 50 MCG/ACT nasal spray  Commonly known as: FLONASE  1 spray by Each Nostril route daily as needed for Rhinitis     furosemide 20 MG tablet  Commonly known as: LASIX     ipratropium-albuterol 0.5-2.5 (3) MG/3ML Soln nebulizer solution  Commonly known as: DUONEB  Inhale 3 mLs into the lungs every 6 hours     melatonin 5 MG Tbdp disintegrating tablet  Take 1 tablet by mouth nightly as needed (sleep)     menthol-zinc oxide 0.44-20.6 % Oint ointment  Commonly known as: CALMOSEPTINE  Apply topically 2 times daily as needed (Apply to buttocks until resolved) Max 30 ml per day. metoprolol tartrate 25 MG tablet  Commonly known as: LOPRESSOR  Take 1 tablet by mouth 2 times daily     pantoprazole 20 MG tablet  Commonly known as: PROTONIX     polyethylene glycol 17 g packet  Commonly known as: GLYCOLAX  Take 17 g by mouth 2 times daily     potassium chloride 20 MEQ packet  Commonly known as: KLOR-CON     sertraline 50 MG tablet  Commonly known as: ZOLOFT     Spiriva HandiHaler 18 MCG inhalation capsule  Generic drug: tiotropium     Symbicort 160-4.5 MCG/ACT Aero  Generic drug: budesonide-formoterol        STOP taking these medications    bisacodyl 10 MG suppository  Commonly known as: DULCOLAX     guaiFENesin 600 MG extended release tablet  Commonly known as: MUCINEX           Where to Get Your Medications      You can get these medications from any pharmacy    Bring a paper prescription for each of these medications  · ceFEPIme 2 g injection  · vancomycin 1000 MG injection           Discharged in stable condition to home. Refused SNF. Follow Up: Follow up with PCP.     Mike Linton MD, 10/26/2021 3:12 PM

## 2021-09-23 NOTE — CARE COORDINATION
[facility]/ [facility]  Spoke with Lashell JAIME in follow up. Notified [facility] pt discharging home today with IV SOC in am 0800 9/24. Notified [facility] pt discharging home today with IV SOC in am 0800 9/24. [facility] aware of PC referral to [facility]. Spoke with pt and niece in follow up. Request for call to daughter, Law Zamorano. Agreeable to [facility] for SN, PT/OT, HHA. Verified demographics. Reviewed IV abx. Pt agreeable to [facility]. Pt aware of copays and agreeable. See note from 9/22. Pt aware SN SOC will be scheduled for tomorrow morning 9/24. Telephone call to daughter Law Zamorano [phone]. Reviewed  and [facility] for IV abx. Law Zamorano states she will be with her mom to assist with IV abx. Agreeable to copay. Telephone call to Dr. Ennis Late office. LM. Closed for lunch. [facility] Faxed referral with orders to [facility].     Electronically signed by Sarah Bell RN on 9/23/2021 at 1:20 PM

## 2021-09-23 NOTE — ED NOTES
Patient resting in bed at this time, alert and responsive, able to follow commands, bipap currently in place. Patient denies needs at this time.  No signs/symptoms distress noted      Deniz Turner RN  09/23/21 2178

## 2021-09-23 NOTE — PROGRESS NOTES
Vancomycin Day: 8/14    Patient's labs, cultures, vitals, and vancomycin regimen reviewed. No changes today.   Level due on 9/24  Belkys Zuñiga Pharm D 7/28/264384:24 AM  .

## 2021-09-23 NOTE — PROGRESS NOTES
Pt repositioned per request. Pt resting in bed quietly at this time. Denies any needs. All needs met at this time and call light within reach.

## 2021-09-23 NOTE — PROGRESS NOTES
Discharge instructions, bipap instructions, and medications given to patient and granddaughter. Both verbalized understanding. Patient alert and orientedx3, nasal cannula in place 2L. Bill discontinued, monitor discontinued. Patient sitting up eating food, tolerating well.

## 2021-09-23 NOTE — PROGRESS NOTES
PM assessment complete . See doc flowsheets. Patient sitting up in bed. A&O x4. VSS. Sinus tach on monitor. O2 sat 98% on 2L. Left midline in place infusing cefepime at present. Bilateral heel protectors in place. SCD for DVT prophylaxis. Patient denies pain at present. Denies needs at present. Call light within easy reach. Will continue to monitor.  Anastasia Olivier RN

## 2021-09-24 LAB
GLUCOSE BLD-MCNC: 132 MG/DL (ref 70–99)
LACTIC ACID: 2 MMOL/L (ref 0.4–2)
LACTIC ACID: 3.2 MMOL/L (ref 0.4–2)
PERFORMED ON: ABNORMAL
TROPONIN: 0.07 NG/ML

## 2021-09-24 PROCEDURE — 6360000002 HC RX W HCPCS: Performed by: HOSPITALIST

## 2021-09-24 PROCEDURE — 83605 ASSAY OF LACTIC ACID: CPT

## 2021-09-24 PROCEDURE — 6370000000 HC RX 637 (ALT 250 FOR IP): Performed by: HOSPITALIST

## 2021-09-24 PROCEDURE — 36415 COLL VENOUS BLD VENIPUNCTURE: CPT

## 2021-09-24 PROCEDURE — 84484 ASSAY OF TROPONIN QUANT: CPT

## 2021-09-24 PROCEDURE — 99233 SBSQ HOSP IP/OBS HIGH 50: CPT | Performed by: INTERNAL MEDICINE

## 2021-09-24 PROCEDURE — 94660 CPAP INITIATION&MGMT: CPT

## 2021-09-24 PROCEDURE — 2060000000 HC ICU INTERMEDIATE R&B

## 2021-09-24 PROCEDURE — 2580000003 HC RX 258: Performed by: HOSPITALIST

## 2021-09-24 PROCEDURE — 94640 AIRWAY INHALATION TREATMENT: CPT

## 2021-09-24 PROCEDURE — 94761 N-INVAS EAR/PLS OXIMETRY MLT: CPT

## 2021-09-24 PROCEDURE — 6370000000 HC RX 637 (ALT 250 FOR IP): Performed by: INTERNAL MEDICINE

## 2021-09-24 PROCEDURE — 2700000000 HC OXYGEN THERAPY PER DAY

## 2021-09-24 PROCEDURE — 99232 SBSQ HOSP IP/OBS MODERATE 35: CPT | Performed by: INTERNAL MEDICINE

## 2021-09-24 RX ORDER — PREDNISONE 20 MG/1
40 TABLET ORAL DAILY
Status: DISCONTINUED | OUTPATIENT
Start: 2021-09-24 | End: 2021-09-27 | Stop reason: HOSPADM

## 2021-09-24 RX ORDER — ACETAMINOPHEN 650 MG/1
650 SUPPOSITORY RECTAL EVERY 6 HOURS PRN
Status: DISCONTINUED | OUTPATIENT
Start: 2021-09-24 | End: 2021-09-27 | Stop reason: HOSPADM

## 2021-09-24 RX ORDER — ONDANSETRON 4 MG/1
4 TABLET, ORALLY DISINTEGRATING ORAL EVERY 8 HOURS PRN
Status: DISCONTINUED | OUTPATIENT
Start: 2021-09-24 | End: 2021-09-27 | Stop reason: HOSPADM

## 2021-09-24 RX ORDER — ONDANSETRON 2 MG/ML
4 INJECTION INTRAMUSCULAR; INTRAVENOUS EVERY 6 HOURS PRN
Status: DISCONTINUED | OUTPATIENT
Start: 2021-09-24 | End: 2021-09-27 | Stop reason: HOSPADM

## 2021-09-24 RX ORDER — PANTOPRAZOLE SODIUM 20 MG/1
20 TABLET, DELAYED RELEASE ORAL DAILY
Status: DISCONTINUED | OUTPATIENT
Start: 2021-09-24 | End: 2021-09-27 | Stop reason: HOSPADM

## 2021-09-24 RX ORDER — IPRATROPIUM BROMIDE AND ALBUTEROL SULFATE 2.5; .5 MG/3ML; MG/3ML
1 SOLUTION RESPIRATORY (INHALATION)
Status: DISCONTINUED | OUTPATIENT
Start: 2021-09-24 | End: 2021-09-27 | Stop reason: HOSPADM

## 2021-09-24 RX ORDER — ACETAMINOPHEN 325 MG/1
650 TABLET ORAL EVERY 6 HOURS PRN
Status: DISCONTINUED | OUTPATIENT
Start: 2021-09-24 | End: 2021-09-27 | Stop reason: HOSPADM

## 2021-09-24 RX ORDER — MECOBALAMIN 5000 MCG
5 TABLET,DISINTEGRATING ORAL NIGHTLY PRN
Status: DISCONTINUED | OUTPATIENT
Start: 2021-09-24 | End: 2021-09-27 | Stop reason: HOSPADM

## 2021-09-24 RX ORDER — BUDESONIDE 0.5 MG/2ML
0.5 INHALANT ORAL 2 TIMES DAILY
Status: DISCONTINUED | OUTPATIENT
Start: 2021-09-24 | End: 2021-09-27 | Stop reason: HOSPADM

## 2021-09-24 RX ORDER — POLYETHYLENE GLYCOL 3350 17 G/17G
17 POWDER, FOR SOLUTION ORAL DAILY PRN
Status: DISCONTINUED | OUTPATIENT
Start: 2021-09-24 | End: 2021-09-27 | Stop reason: HOSPADM

## 2021-09-24 RX ORDER — ATORVASTATIN CALCIUM 10 MG/1
20 TABLET, FILM COATED ORAL NIGHTLY
Status: DISCONTINUED | OUTPATIENT
Start: 2021-09-24 | End: 2021-09-27 | Stop reason: HOSPADM

## 2021-09-24 RX ORDER — SODIUM CHLORIDE 0.9 % (FLUSH) 0.9 %
5-40 SYRINGE (ML) INJECTION PRN
Status: DISCONTINUED | OUTPATIENT
Start: 2021-09-24 | End: 2021-09-27 | Stop reason: HOSPADM

## 2021-09-24 RX ORDER — CLOPIDOGREL BISULFATE 75 MG/1
75 TABLET ORAL DAILY
Status: DISCONTINUED | OUTPATIENT
Start: 2021-09-24 | End: 2021-09-27 | Stop reason: HOSPADM

## 2021-09-24 RX ORDER — SODIUM CHLORIDE 9 MG/ML
25 INJECTION, SOLUTION INTRAVENOUS PRN
Status: DISCONTINUED | OUTPATIENT
Start: 2021-09-24 | End: 2021-09-27 | Stop reason: HOSPADM

## 2021-09-24 RX ORDER — IPRATROPIUM BROMIDE AND ALBUTEROL SULFATE 2.5; .5 MG/3ML; MG/3ML
1 SOLUTION RESPIRATORY (INHALATION) EVERY 4 HOURS PRN
Status: DISCONTINUED | OUTPATIENT
Start: 2021-09-24 | End: 2021-09-27 | Stop reason: HOSPADM

## 2021-09-24 RX ORDER — IPRATROPIUM BROMIDE AND ALBUTEROL SULFATE 2.5; .5 MG/3ML; MG/3ML
1 SOLUTION RESPIRATORY (INHALATION)
Status: DISCONTINUED | OUTPATIENT
Start: 2021-09-24 | End: 2021-09-24

## 2021-09-24 RX ORDER — METHYLPREDNISOLONE SODIUM SUCCINATE 40 MG/ML
40 INJECTION, POWDER, LYOPHILIZED, FOR SOLUTION INTRAMUSCULAR; INTRAVENOUS EVERY 8 HOURS
Status: DISCONTINUED | OUTPATIENT
Start: 2021-09-24 | End: 2021-09-24

## 2021-09-24 RX ORDER — POLYETHYLENE GLYCOL 3350 17 G/17G
17 POWDER, FOR SOLUTION ORAL 2 TIMES DAILY
Status: DISCONTINUED | OUTPATIENT
Start: 2021-09-24 | End: 2021-09-27 | Stop reason: HOSPADM

## 2021-09-24 RX ORDER — SODIUM CHLORIDE 9 MG/ML
INJECTION, SOLUTION INTRAVENOUS CONTINUOUS
Status: DISCONTINUED | OUTPATIENT
Start: 2021-09-24 | End: 2021-09-24

## 2021-09-24 RX ORDER — SODIUM CHLORIDE 0.9 % (FLUSH) 0.9 %
5-40 SYRINGE (ML) INJECTION EVERY 12 HOURS SCHEDULED
Status: DISCONTINUED | OUTPATIENT
Start: 2021-09-24 | End: 2021-09-27 | Stop reason: HOSPADM

## 2021-09-24 RX ORDER — BUSPIRONE HYDROCHLORIDE 5 MG/1
5 TABLET ORAL 3 TIMES DAILY
Status: DISCONTINUED | OUTPATIENT
Start: 2021-09-24 | End: 2021-09-27 | Stop reason: HOSPADM

## 2021-09-24 RX ORDER — FLUTICASONE PROPIONATE 50 MCG
1 SPRAY, SUSPENSION (ML) NASAL DAILY PRN
Status: DISCONTINUED | OUTPATIENT
Start: 2021-09-24 | End: 2021-09-27 | Stop reason: HOSPADM

## 2021-09-24 RX ADMIN — CLOPIDOGREL BISULFATE 75 MG: 75 TABLET ORAL at 09:17

## 2021-09-24 RX ADMIN — SODIUM CHLORIDE, PRESERVATIVE FREE 10 ML: 5 INJECTION INTRAVENOUS at 09:30

## 2021-09-24 RX ADMIN — METHYLPREDNISOLONE SODIUM SUCCINATE 40 MG: 40 INJECTION, POWDER, FOR SOLUTION INTRAMUSCULAR; INTRAVENOUS at 03:11

## 2021-09-24 RX ADMIN — BUDESONIDE 500 MCG: 0.5 SUSPENSION RESPIRATORY (INHALATION) at 18:12

## 2021-09-24 RX ADMIN — IPRATROPIUM BROMIDE AND ALBUTEROL SULFATE 1 AMPULE: .5; 2.5 SOLUTION RESPIRATORY (INHALATION) at 18:12

## 2021-09-24 RX ADMIN — IPRATROPIUM BROMIDE AND ALBUTEROL SULFATE 1 AMPULE: .5; 2.5 SOLUTION RESPIRATORY (INHALATION) at 23:07

## 2021-09-24 RX ADMIN — ENOXAPARIN SODIUM 40 MG: 40 INJECTION SUBCUTANEOUS at 09:16

## 2021-09-24 RX ADMIN — VANCOMYCIN HYDROCHLORIDE 1000 MG: 1 INJECTION, POWDER, LYOPHILIZED, FOR SOLUTION INTRAVENOUS at 05:06

## 2021-09-24 RX ADMIN — BUSPIRONE HYDROCHLORIDE 5 MG: 5 TABLET ORAL at 09:16

## 2021-09-24 RX ADMIN — IPRATROPIUM BROMIDE AND ALBUTEROL SULFATE 1 AMPULE: .5; 2.5 SOLUTION RESPIRATORY (INHALATION) at 08:15

## 2021-09-24 RX ADMIN — ROFLUMILAST 250 MCG: 500 TABLET ORAL at 09:17

## 2021-09-24 RX ADMIN — TIOTROPIUM BROMIDE INHALATION SPRAY 2 PUFF: 3.12 SPRAY, METERED RESPIRATORY (INHALATION) at 08:24

## 2021-09-24 RX ADMIN — IPRATROPIUM BROMIDE AND ALBUTEROL SULFATE 1 AMPULE: .5; 2.5 SOLUTION RESPIRATORY (INHALATION) at 13:01

## 2021-09-24 RX ADMIN — METOPROLOL TARTRATE 25 MG: 25 TABLET, FILM COATED ORAL at 19:55

## 2021-09-24 RX ADMIN — POLYETHYLENE GLYCOL (3350) 17 G: 17 POWDER, FOR SOLUTION ORAL at 03:41

## 2021-09-24 RX ADMIN — SODIUM CHLORIDE: 9 INJECTION, SOLUTION INTRAVENOUS at 03:14

## 2021-09-24 RX ADMIN — POLYETHYLENE GLYCOL (3350) 17 G: 17 POWDER, FOR SOLUTION ORAL at 09:16

## 2021-09-24 RX ADMIN — IPRATROPIUM BROMIDE AND ALBUTEROL SULFATE 1 AMPULE: .5; 2.5 SOLUTION RESPIRATORY (INHALATION) at 15:36

## 2021-09-24 RX ADMIN — SODIUM CHLORIDE, PRESERVATIVE FREE 10 ML: 5 INJECTION INTRAVENOUS at 19:56

## 2021-09-24 RX ADMIN — METOPROLOL TARTRATE 25 MG: 25 TABLET, FILM COATED ORAL at 03:41

## 2021-09-24 RX ADMIN — CEFEPIME 2000 MG: 2 INJECTION, POWDER, FOR SOLUTION INTRAVENOUS at 18:49

## 2021-09-24 RX ADMIN — BUDESONIDE 500 MCG: 0.5 SUSPENSION RESPIRATORY (INHALATION) at 08:15

## 2021-09-24 RX ADMIN — MUPIROCIN: 20 OINTMENT TOPICAL at 09:20

## 2021-09-24 RX ADMIN — ATORVASTATIN CALCIUM 20 MG: 10 TABLET, FILM COATED ORAL at 19:55

## 2021-09-24 RX ADMIN — CEFEPIME 2000 MG: 2 INJECTION, POWDER, FOR SOLUTION INTRAVENOUS at 06:35

## 2021-09-24 RX ADMIN — PANTOPRAZOLE SODIUM 20 MG: 20 TABLET, DELAYED RELEASE ORAL at 09:17

## 2021-09-24 RX ADMIN — PREDNISONE 40 MG: 20 TABLET ORAL at 09:17

## 2021-09-24 ASSESSMENT — PAIN SCALES - GENERAL
PAINLEVEL_OUTOF10: 0

## 2021-09-24 ASSESSMENT — PAIN DESCRIPTION - PAIN TYPE: TYPE: ACUTE PAIN

## 2021-09-24 ASSESSMENT — PAIN DESCRIPTION - FREQUENCY: FREQUENCY: INTERMITTENT

## 2021-09-24 ASSESSMENT — PAIN DESCRIPTION - PROGRESSION: CLINICAL_PROGRESSION: NOT CHANGED

## 2021-09-24 ASSESSMENT — PAIN DESCRIPTION - LOCATION: LOCATION: RECTUM

## 2021-09-24 ASSESSMENT — PAIN DESCRIPTION - ORIENTATION: ORIENTATION: OTHER (COMMENT)

## 2021-09-24 NOTE — PROGRESS NOTES
Patient Shift Assessment complete    VSS at this time, Blood pressure (!) 143/91, pulse 97, temperature 98.5 °F (36.9 °C), temperature source Bladder, resp. rate 25, SpO2 100 %    Patient admit to ICU, tolerated transfer well. Temporarily taken off BiPAP and placed on nonrebreathing. Saturation 100%. Placed back on biPAP    Bowel sounds active In all quadrants. Lung sounds diminished in all lobes. Patient denies pain or discomfort at this time. Dr. Carmelo Vaz agreed to allow patient coffee and water if not on BiPAP. See separate skin assessment- 4eyes. LDA photos applied.      Electronically signed by Sulaiman Cruz RN on 9/24/2021 at 2:20 AM

## 2021-09-24 NOTE — PROGRESS NOTES
Unable to drawl AM labs; lactic. Notified Dr. Cari Jean via perfect serve. Patient has poor access, has peripheral IV 24 G Rt forearm and midline single luer Left Brachial; neither have blood return but do infuse. Lab and nurse drawl not possible due to poor vascular integrity. Previously drawn with ultrasound aide and little blood as well. Patient does not have a lot infusing to require a central line.  Were unable to drawl AM lactic     Electronically signed by Candie Ohara RN on 9/24/2021 at 6:57 AM

## 2021-09-24 NOTE — PROGRESS NOTES
Pulmonary Progress Note  CC: End-stage COPD    Subjective: Patient responded to BiPAP and is alert this morning  Family states that pt was only home 3 hrs and had a panoic attack and did not tolerate her astral and went unresponsive. EXAM: BP (!) 140/71   Pulse 79   Temp 98.7 °F (37.1 °C) (Bladder)   Resp 24   Ht 5' 2\" (1.575 m)   Wt 115 lb 2 oz (52.2 kg)   SpO2 100%   BMI 21.06 kg/m²  on 6 L  Constitutional:  No acute distress. Eyes: PERRL. Conjunctivae anicteric. ENT: Normal nose. Normal tongue. Neck:  Trachea is midline. No thyroid tenderness. Respiratory: No accessory muscle usage. decreased breath sounds. No wheezes. No rales. No Rhonchi. Cardiovascular: Normal S1S2. No digit clubbing. No digit cyanosis. No LE edema. Psychiatric: No anxiety or Agitation. Alert and Oriented to person, place and time.     Scheduled Meds:   cefepime  2,000 mg IntraVENous Q12H    atorvastatin  20 mg Oral Nightly    budesonide  0.5 mg Nebulization BID    busPIRone  5 mg Oral TID    clopidogrel  75 mg Oral Daily    metoprolol tartrate  25 mg Oral BID    pantoprazole  20 mg Oral Daily    polyethylene glycol  17 g Oral BID    Roflumilast  250 mcg Oral Daily    tiotropium  2 puff Inhalation Daily    sodium chloride flush  5-40 mL IntraVENous 2 times per day    enoxaparin  40 mg SubCUTAneous Daily    ipratropium-albuterol  1 ampule Inhalation Q4H While awake    [START ON 9/25/2021] sertraline  50 mg Oral Daily    mupirocin   Nasal BID    vancomycin (VANCOCIN) intermittent dosing (placeholder)   Other RX Placeholder    predniSONE  40 mg Oral Daily     Continuous Infusions:   sodium chloride       PRN Meds:  fluticasone, melatonin, sodium chloride flush, sodium chloride, ondansetron **OR** ondansetron, polyethylene glycol, acetaminophen **OR** acetaminophen, ipratropium-albuterol, iopamidol    Labs:  CBC:   Recent Labs     09/22/21  0734 09/23/21  0529 09/23/21  1632   WBC 8.2 10.3 18.4*   HGB 11.0* 11.0* 13.1   HCT 34.6* 34.6* 42.4   MCV 87.8 88.4 90.1    220 320     BMP:   Recent Labs     09/22/21  0734 09/23/21  0529 09/23/21  1632    142 140   K 3.4* 3.3* 4.7    109 108   CO2 26 25 23   BUN 12 14 14   CREATININE 0.7 0.7 0.9       CTPA:   Lungs/pleura: There is advanced centrilobular emphysema. Windell Elda may be a   superimposed interstitial opacities. There are small bilateral pleural   effusions, layer to the apices.  Dependent atelectasis is noted in both lower   lobes.          ASSESSMENT:  · Acute on chronic hypoxic respiratory failure  · Current life threatening hypercarbic respiratory failure  · End stage COPD  · CHF    PLAN:  · No evidence for pneumonia on Chest CT  · Bipap PRN and QHS - Ok for home astral unit if available  · Supplemental oxygen to maintain SaO2 >92%; wean as tolerated  · Intensive inhaled bronchodilator therapy. · Prednisone taper    · Day 9/14 Vanc an Cefepime.    · SW consult  · The pt will need placement into a a vent capable nursing home for supervision of nightly and PRN AVAPS to keep the pt out of the hospital or from sudden death at home

## 2021-09-24 NOTE — CARE COORDINATION
Readmission Assessment  Number of Days since last admission?: 1-7 days  Previous Disposition: Home with Home Health (with [facility] as well)  Who is being Interviewed: Caregiver  What was the patient's/caregiver's perception as to why they think they needed to return back to the hospital?: Did not realize care needs would be so extensive  Did you visit your Primary Care Physician after you left the hospital, before you returned this time?: No  Why weren't you able to visit your PCP?: Other (Comment) (came right back to hospital, not home long enough to f/u with PCP)  Did you see a specialist, such as Cardiac, Pulmonary, Orthopedic Physician, etc. after you left the hospital?: No  Who advised the patient to return to the hospital?: Caregiver  Does the patient report anything that got in the way of taking their medications?: No  In our efforts to provide the best possible care to you and others like you, can you think of anything that we could have done to help you after you left the hospital the first time, so that you might not have needed to return so soon?: Other (Comment) (n/a, family and pt declines hospice, and STR) needs STR at d/c vrs home with hospice

## 2021-09-24 NOTE — FLOWSHEET NOTE
09/24/21 1515   Oxygen Therapy   SpO2 95 %   O2 Flow Rate (L/min) 2 L/min     Oxygen titrated down to 2L patient tolerating well and maintaining a saturation above 90%

## 2021-09-24 NOTE — ED NOTES
Dr. Kimmy Luna returned call at 1218 EKike Broderick [facility]  09/23/21 0866

## 2021-09-24 NOTE — H&P
Hospital Medicine History & Physical      PCP: Elier Sandhu MD    Date of Admission: 9/23/2021    Date of Service: Pt seen/examined on 9/23/2021 and Admitted to Inpatient with expected LOS greater than two midnights due to medical therapy. Chief Complaint:  \" I don't know\"      History Of Present Illness:       [de-identified] y.o. female with end stage COPD, discharged earlier today. She states that she was on bipap at home, has had some nausea, productive cough, but denies fever, chest pain. She is unable to recall events leading to readmission, is currently seen in the ICU on a NRB, awake, alert, oriented in no distress. Past Medical History:          Diagnosis Date    [facility]     Asthma     COPD (chronic obstructive pulmonary disease) [facility]     GERD (gastroesophageal reflux disease) 9/9/2021    Hyperlipidemia     Hypertension     MRSA (methicillin resistant staph aureus) culture positive 09/22/2019    + resp cx    OA (osteoarthritis) 8/12/2014       Past Surgical History:          Procedure Laterality Date    BRONCHOSCOPY  09/08/2019    Dr. Elida Elliott - w/BAL   [address]  09/05/2019    Dr. Karyn Francisco 2/24/2020    COLONOSCOPY DIAGNOSTIC performed by Josie Diaz DO at [facility] N/A 9/19/2019    OFF PUMP CORONARY ARTERY BYPASS GRAFTING X2, INTERNAL MAMMARY ARTERY, SAPHENOUS VEIN GRAFT performed by Fernanda Harley MD at [facility] CATH  9/20/2021    IR MIDLINE CATH 9/20/2021 [facility], PARTIAL Left     SIGMOIDOSCOPY  02/27/2020    4 bands    SIGMOIDOSCOPY N/A 2/27/2020    FLEX SIG W/ BANDING SIGMOIDOSCOPY DIAGNOSTIC FLEXIBLE performed by Josie Diaz DO at [facility]       Medications Prior to Admission:      Prior to Admission medications    Medication Sig Start Date End Date Taking?  Authorizing Provider ceFEPIme (MAXIPIME) 2 g injection Infuse 2,000 mg intravenously every 12 hours for 6 days 9/23/21 9/29/21  Cruz Stephens MD   vancomycin (VANCOCIN) 1000 MG injection Infuse 500 mg intravenously every 24 hours for 6 days 9/23/21 9/29/21  Cruz Stephens MD   menthol-zinc oxide (CALMOSEPTINE) 0.44-20.6 % OINT ointment Apply topically 2 times daily as needed (Apply to buttocks until resolved) Max 30 ml per day.  9/14/21   Stanislaw Hurd MD   polyethylene glycol (GLYCOLAX) 17 g packet Take 17 g by mouth 2 times daily 9/14/21   Stanislaw Hurd MD   melatonin 5 MG TBDP disintegrating tablet Take 1 tablet by mouth nightly as needed (sleep) 9/14/21   Stanislaw Hurd MD   fluticasone [facility]) 50 MCG/ACT nasal spray 1 spray by Each Nostril route daily as needed for Rhinitis 9/14/21   Stanislaw Hurd MD   busPIRone (BUSPAR) 10 MG tablet Take 5 mg by mouth 3 times daily as needed Take 1 to 2 tablets 3 times daily PRN    Historical Provider, MD   pantoprazole (PROTONIX) 20 MG tablet Take 20 mg by mouth daily    Historical Provider, MD   furosemide (LASIX) 20 MG tablet Take 20 mg by mouth daily mon wed fri    Historical Provider, MD   ferrous sulfate (IRON 325) 325 (65 Fe) MG tablet Take 325 mg by mouth daily (with breakfast)    Historical Provider, MD   potassium chloride (KLOR-CON) 20 MEQ packet Take 20 mEq by mouth daily    Historical Provider, MD   Roflumilast (DALIRESP) 250 MCG tablet Take 250 mcg by mouth daily    Historical Provider, MD   acetaminophen (TYLENOL) 500 MG tablet Take 500 mg by mouth every 6 hours as needed for Pain    Historical Provider, MD   sertraline (ZOLOFT) 50 MG tablet Take 50 mg by mouth daily    Historical Provider, MD   budesonide (PULMICORT) 0.5 MG/2ML nebulizer suspension Take 2 mLs by nebulization 2 times daily 9/24/19   Renetta Wright MD   ipratropium-albuterol (DUONEB) 0.5-2.5 (3) MG/3ML SOLN nebulizer solution Inhale 3 mLs into the lungs every 6 hours 9/24/19   Yolanda DOBBINS MD Salvatore   atorvastatin (LIPITOR) 20 MG tablet Take 1 tablet by mouth nightly 9/24/19   Yasmine Lobo MD   metoprolol tartrate (LOPRESSOR) 25 MG tablet Take 1 tablet by mouth 2 times daily 9/24/19   Mohi Dayne Osler, MD   docusate sodium (COLACE, DULCOLAX) 100 MG CAPS Take 100 mg by mouth 2 times daily 9/24/19   Mohi Dayne Osler, MD   clopidogrel (PLAVIX) 75 MG tablet Take 1 tablet by mouth daily 9/7/19   Nikunj Mcdermott MD   tiotropium (Lenetta Sarthak) 18 MCG inhalation capsule Inhale 18 mcg into the lungs daily 7/25/16   Historical Provider, MD   budesonide-formoterol (SYMBICORT) 160-4.5 MCG/ACT AERO Inhale 2 puffs into the lungs 2 times daily    Historical Provider, MD   albuterol (PROVENTIL HFA) 108 (90 BASE) MCG/ACT inhaler Inhale 2 puffs into the lungs every 6 hours as needed for Wheezing or Shortness of Breath. 12/20/12   Manuel Jaramillo MD       Allergies:  Latex and Codeine    Social History:           TOBACCO:   reports that she quit smoking about 2 years ago. Her smoking use included cigarettes. She has a 25.00 pack-year smoking history. She has never used smokeless tobacco.  ETOH:   reports no history of alcohol use. Family History:             Problem Relation Age of Onset    Cancer Mother     Heart Disease Father     Stroke Father     Heart Disease Sister     Stroke Sister        REVIEW OF SYSTEMS:   Pertinent positives as noted in the HPI. All other systems reviewed and negative. PHYSICAL EXAM PERFORMED:    /87   Pulse 97   Temp 97 °F (36.1 °C) (Axillary)   Resp 23   SpO2 100%     General appearance:  No apparent distress, appears stated age and cooperative. HEENT:  Normal cephalic, atraumatic without obvious deformity. Pupils equal, round, and reactive to light. Extra ocular muscles intact. Conjunctivae/corneas clear. Neck: Supple, with full range of motion. No jugular venous distention. Trachea midline. Respiratory:  Normal respiratory effort.  No use of accessory muscles, no intercostal retractions  Cardiovascular:  Regular rate and rhythm    Abdomen: Soft, non-tender, non-distended    Musculoskeletal:  No clubbing, cyanosis or edema bilaterally. No calf tenderness  Skin: Skin color, texture, turgor normal.    Neurologic:  grossly non-focal.  Psychiatric:  Alert and oriented          Labs:     Recent Labs     09/22/21  0734 09/23/21  0529 09/23/21  1632   WBC 8.2 10.3 18.4*   HGB 11.0* 11.0* 13.1   HCT 34.6* 34.6* 42.4    220 320     Recent Labs     09/22/21  0734 09/23/21  0529 09/23/21  1632    142 140   K 3.4* 3.3* 4.7    109 108   CO2 26 25 23   BUN 12 14 14   CREATININE 0.7 0.7 0.9   CALCIUM 8.7 8.8 9.0     Recent Labs     09/22/21  0734 09/23/21  0529 09/23/21  1632   AST 9* 11* 15   ALT 6* 7* 9*   BILITOT 0.3 0.3 0.3   ALKPHOS 70 69 98     No results for input(s): INR in the last 72 hours. Recent Labs     09/23/21  1632   TROPONINI 0.04*       Urinalysis:      Lab Results   Component Value Date    NITRU Negative 09/17/2021    WBCUA 10-20 09/17/2021    BACTERIA 1+ 09/17/2021    RBCUA 3-4 09/17/2021    BLOODU MODERATE 09/17/2021    SPECGRAV 1.015 09/17/2021    GLUCOSEU Negative 09/17/2021       Radiology:          CT CHEST PULMONARY EMBOLISM W CONTRAST   Final Result   No acute pulmonary embolus. Small bilateral pleural effusions and interstitial opacities, suggestive of   pulmonary edema, superimposed on fibrosis and emphysema. Mild bibasilar atelectasis. Mild cardiomegaly. Mild prominence of the central pulmonary arteries. This may be associated   with pulmonary arterial hypertension. XR CHEST PORTABLE   Final Result   Bilateral lung opacities suspicious for atypical/viral pattern of pneumonia. Interstitial edema could appear similar             ASSESSMENT:    There are no active hospital problems to display for this patient. PLAN:    HCAP  - resuming IV abx coverage  - priocalcitonin 0.81 onj admission, up from . 71 on

## 2021-09-24 NOTE — PROGRESS NOTES
Patient reassessment complete    VSS at this time, Blood pressure (!) 162/62, pulse 77, temperature 98.5 °F (36.9 °C), temperature source Bladder, resp.  rate 19, height 5' 2\" (1.575 m), weight 115 lb 2 oz (52.2 kg), SpO2 100 %    Patient has NS infusing at 75 ml/hr    Patient denies pain or discomfort at this time, No further changes from previous assessment     Electronically signed by Paul Spears RN on 9/24/2021 at 5:03 AM

## 2021-09-24 NOTE — CONSULTS
Reason for referral and CC: Resp failure    HISTORY OF PRESENT ILLNESS: 64-year-old female with end-stage COPD and recurrent admissions for life-threatening hypercarbic respiratory failure. She was discharged yesterday morning and came back in the evening again with life-threatening respiratory failure unresponsive with elevated CO2. She is currently on BiPAP and unable to answer questions in respiratory distress. Pt seen in ED  Past Medical History:   Diagnosis Date    [facility]     Asthma     COPD (chronic obstructive pulmonary disease) [facility] [facility]     GERD (gastroesophageal reflux disease) 9/9/2021    Hyperlipidemia     Hypertension     MRSA (methicillin resistant staph aureus) culture positive 09/22/2019    + resp cx    OA (osteoarthritis) 8/12/2014     Past Surgical History:   Procedure Laterality Date    BRONCHOSCOPY  09/08/2019    Dr. Ritu Monreal - w/BAL   [address]  09/05/2019    Dr. Seun Montes 2/24/2020    COLONOSCOPY DIAGNOSTIC performed by Eladio Crook DO at [address] [place] N/A 9/19/2019    OFF PUMP CORONARY ARTERY BYPASS GRAFTING X2, INTERNAL MAMMARY ARTERY, SAPHENOUS VEIN GRAFT performed by Severa Norlander, MD at [facility] CATH  9/20/2021    IR MIDLINE CATH 9/20/2021 [facility]    MASTECTOMY, PARTIAL Left     SIGMOIDOSCOPY  02/27/2020    4 bands    SIGMOIDOSCOPY N/A 2/27/2020    FLEX SIG W/ BANDING SIGMOIDOSCOPY DIAGNOSTIC FLEXIBLE performed by Eladio Crook DO at [address] History  family history includes Cancer in her mother; Heart Disease in her father and sister; Stroke in her father and sister. Social History:  reports that she quit smoking about 2 years ago. Her smoking use included cigarettes. She has a 25.00 pack-year smoking history.  She has never used smokeless tobacco.   reports no history of alcohol use.    ALLERGIES:  Patient is allergic to latex and codeine. Continuous Infusions:   sodium chloride       Scheduled Meds:   cefepime  2,000 mg IntraVENous Q12H    atorvastatin  20 mg Oral Nightly    budesonide  0.5 mg Nebulization BID    busPIRone  5 mg Oral TID    clopidogrel  75 mg Oral Daily    metoprolol tartrate  25 mg Oral BID    pantoprazole  20 mg Oral Daily    polyethylene glycol  17 g Oral BID    Roflumilast  250 mcg Oral Daily    tiotropium  2 puff Inhalation Daily    sodium chloride flush  5-40 mL IntraVENous 2 times per day    enoxaparin  40 mg SubCUTAneous Daily    ipratropium-albuterol  1 ampule Inhalation Q4H While awake    [START ON 9/25/2021] sertraline  50 mg Oral Daily    mupirocin   Nasal BID    vancomycin (VANCOCIN) intermittent dosing (placeholder)   Other RX Placeholder    predniSONE  40 mg Oral Daily     PRN Meds:  fluticasone, melatonin, sodium chloride flush, sodium chloride, ondansetron **OR** ondansetron, polyethylene glycol, acetaminophen **OR** acetaminophen, ipratropium-albuterol, iopamidol    REVIEW OF SYSTEMS:  Pt unable to answer    PHYSICAL EXAM: BP (!) 140/71   Pulse 79   Temp 98.7 °F (37.1 °C) (Bladder)   Resp 24   Ht 5' 2\" (1.575 m)   Wt 115 lb 2 oz (52.2 kg)   SpO2 100%   BMI 21.06 kg/m²  on Bipap  Constitutional:  No acute distress. Eyes: PERRL. Conjunctivae anicteric. ENT: Normal nose. Normal tongue. Neck:  Trachea is midline. No thyroid tenderness. Respiratory: + accessory muscle usage. + decreased breath sounds. No wheezes. No rales. No Rhonchi. Cardiovascular: Normal S1S2. No digit clubbing. No digit cyanosis. No LE edema. Capillary refill is normal.  Gastrointestinal: No mass palpated. No tenderness palpated. No umbilical hernia. Lymphatic: No cervical or supraclavicular adenopathy. Skin: multiple bruises.   Psychiatric: letharguc    CBC:   Recent Labs     09/22/21  0734 09/23/21  0529 09/23/21  1632   WBC 8.2 10.3 18.4*   HGB 11.0* 11.0* 13.1   HCT 34.6* 34.6* 42.4   MCV 87.8 88.4 90.1    220 320     BMP:   Recent Labs     09/22/21  0734 09/23/21  0529 09/23/21  1632    142 140   K 3.4* 3.3* 4.7    109 108   CO2 26 25 23   BUN 12 14 14   CREATININE 0.7 0.7 0.9        Recent Labs     09/22/21  0734 09/23/21  0529 09/23/21  1632   AST 9* 11* 15   ALT 6* 7* 9*   BILITOT 0.3 0.3 0.3   ALKPHOS 70 69 98     No results for input(s): PROTIME, INR in the last 72 hours. No results for input(s): NITRITE, COLORU, PHUR, LABCAST, WBCUA, RBCUA, MUCUS, TRICHOMONAS, YEAST, BACTERIA, CLARITYU, SPECGRAV, LEUKOCYTESUR, UROBILINOGEN, BILIRUBINUR, BLOODU, GLUCOSEU, AMORPHOUS in the last 72 hours. Invalid input(s): KETONESU  No results for input(s): PHART, NUW2KNX, PO2ART in the last 72 hours. Chest imaging was reviewed by me and showed CXR  Impression   Bilateral lung opacities suspicious for atypical/viral pattern of pneumonia. Interstitial edema could appear similar         ASSESSMENT:  ·  Acute on chronic hypoxic respiratory failure  · End stage COPD  · Possibly recurrent pneumonia  · CHF    PLAN:  · Noninvasive positive pressure ventilation per my orders if needed  Supplemental oxygen to maintain SaO2 >92%; wean as tolerated  · Intensive inhaled bronchodilator therapy. · IV solumedrol 40 mg IV Q12 hrs. Plan to switch to oral prednisone taper when improved. · ABX  · Sputum GS&C. · Due to at least single organ failure and risk of rapid deterioration, I spent 31 minutes of Critical care time reviewing labs/films, examining patient, collaborating with other physicians. This does not include time performing critical procedures.     Thank you Namita Ramos, * for this consult

## 2021-09-24 NOTE — PROGRESS NOTES
day    enoxaparin  40 mg SubCUTAneous Daily    ipratropium-albuterol  1 ampule Inhalation Q4H While awake    vancomycin  1,000 mg IntraVENous Q24H    [START ON 9/25/2021] sertraline  50 mg Oral Daily     I   sodium chloride       fluticasone, melatonin, sodium chloride flush, sodium chloride, ondansetron **OR** ondansetron, polyethylene glycol, acetaminophen **OR** acetaminophen, ipratropium-albuterol, iopamidol    Lab Data:  Recent Labs     09/22/21  0734 09/23/21  0529 09/23/21  1632   WBC 8.2 10.3 18.4*   HGB 11.0* 11.0* 13.1   HCT 34.6* 34.6* 42.4   MCV 87.8 88.4 90.1    220 320     Recent Labs     09/22/21  0734 09/23/21  0529 09/23/21  1632    142 140   K 3.4* 3.3* 4.7    109 108   CO2 26 25 23   BUN 12 14 14   CREATININE 0.7 0.7 0.9     Recent Labs     09/23/21  2105 09/24/21  0305   TROPONINI 0.06* 0.07*       Coagulation:   Lab Results   Component Value Date    INR 0.99 09/16/2021    APTT 59.7 08/31/2021     Cardiac markers:   Lab Results   Component Value Date    CKTOTAL 96 11/03/2015    TROPONINI 0.07 09/24/2021         Lab Results   Component Value Date    ALT 9 (L) 09/23/2021    AST 15 09/23/2021    ALKPHOS 98 09/23/2021    BILITOT 0.3 09/23/2021       Lab Results   Component Value Date    INR 0.99 09/16/2021    INR 1.24 (H) 09/19/2019    INR 1.08 09/19/2019    PROTIME 11.2 09/16/2021    PROTIME 14.1 (H) 09/19/2019    PROTIME 12.3 09/19/2019           CTA     No acute pulmonary embolus. Small bilateral pleural effusions and interstitial opacities, suggestive of   pulmonary edema, superimposed on fibrosis and emphysema. Mild bibasilar atelectasis. Mild cardiomegaly. Mild prominence of the central pulmonary arteries.  This may be associated   with pulmonary arterial hypertension.            Assessment & Plan:    Acute on chronic hypoxic and hypercarbic respiratory failure  With end-stage COPD with acute exacerbation  4th admission for same this month alone.     -Pt

## 2021-09-24 NOTE — PROGRESS NOTES
Shift handoff complete    VSS at this time, Blood pressure 109/76, pulse 71, temperature 98.7 °F (37.1 °C), temperature source Bladder, resp. rate 19, height 5' 2\" (1.575 m), weight 115 lb 2 oz (52.2 kg), SpO2 100 %,     Bedside report and transfer of care to [address] [place] [address]    Patient denies pain or discomfort at this time. No complaints.     Electronically signed by Jacqui Bledsoe RN on 9/24/2021 at 7:11 AM

## 2021-09-24 NOTE — CARE COORDINATION
Case Management Assessment  Initial Evaluation      Patient Name: Rubens Devine  YOB: 1940  Diagnosis: Acute on chronic respiratory failure with hypoxemia [facility]) [J96.21]  Date / Time: 9/23/2021  4:12 PM    Admission status/Date:inpt  Chart Reviewed: Yes      Patient Interviewed: Yes   Family Interviewed:  Yes - dtr,Juan, at bedside      Hospitalization in the last 30 days:  Yes      Health Care Decision Maker :   Primary Decision MakeOrestes Kwan - Child - [phone]    Secondary Decision Maker: Hafsa Tapia - Niece/Nephew - [phone]    (CM - must 1st enter selection under Navigator - emergency contact- [address] Relationship and pick relationship)   Who do you trust or have selected to make healthcare decisions for you      Met with: pt and pt's dtr at bedside  Interview conducted  (bedside/phone):    Current PCP:   Eduardo Jacques MD      Financial  Medicare  Precert required for SNF : Y, N          3 night stay required - Y, N    ADLS  Support Systems/Care Needs:    Transportation: family    Meal Preparation: family    Housing  Living Arrangements: home with son, and dtr  Steps: one  Intent for return to present living arrangements: tbd  Identified Issues: no    Home Care Information  Active with [address] : Yes [facility] Agency:(Services)     Passport/Waiver : No  :                      Phone Number:    Passport/Waiver Services:   no          Durable Medical Equiptment   DME Provider: Amos  Equipment:   Walker_x__Cane___RTS___ BSC__x_Shower Chair_x__Hospital Bed_x__W/C__x__Other________  02 at _2.5___Liter(s)---wears(frequency)___cont____ HHN ___ CPAP___ BiPap___   N/A____      Home O2 Use :  Yes    If No for home O2---if presently on O2 during hospitalization:  Yes  if yes CM to follow for potential DC O2 need  Informed of need for care provider to bring portable home O2 tank on day of discharge for nursing to connect prior to leaving:   Yes  Verbalized agreement/Understanding:   Yes    Community Service Affiliation  Dialysis:  No    · Agency:  · Location:  · Dialysis Schedule:  · Phone:   · Fax: Other Community Services: (ex:PT/OT,Mental Health,Wound Clinic, Cardio/Pul [address])    DISCHARGE PLAN: Explained Case Management role/services. Reviewed chart. Pt from home with family. Pt d/c'd to home several hours ago with [facility] SN,PT/OT, IV abx and Palliative Care services with [facility]. Per daughter O2 stats dropped r/t pt's anxiety levels not under control when she left the hospital. Following. 1420 per  pt will likely need a STR facility that handles Vent pt's such as [facility] or [facility] in order to supervise pt's labile respiratory status. Following.

## 2021-09-24 NOTE — CONSULTS
Pharmacy Note  Vancomycin Consult    Dedra Easton is a [de-identified] y.o. female started on Vancomycin for Pneumonia (HAP); consult received from Dr. Cari Jean to manage therapy. Also receiving the following antibiotics: Cefepime.     Patient Active Problem List   Diagnosis    Hyperlipidemia    Asthma    OA (osteoarthritis)    Tobacco use    COPD exacerbation (HCC)    Sepsis (HCC)    Abnormal chest x-ray    COPD with acute exacerbation (HCC)    Shortness of breath    Tobacco abuse    Moderate malnutrition (HCC)    NSTEMI (non-ST elevated myocardial infarction) ([facility] [address]    Acute respiratory failure with hypoxia (HCC)    CAD (coronary artery disease)    Acute pulmonary edema (HCC)    Pulmonary infiltrate    Elevated brain natriuretic peptide (BNP) level    Leukocytosis    Ischemic cardiomyopathy    Acute combined systolic and diastolic congestive heart failure (HCC)    Hypernatremia    NADJA (acute kidney injury) ([address]    Status post aorto-coronary artery bypass graft    Mucus plugging of bronchi    CAD in native artery    S/P CABG (coronary artery bypass graft)    Pneumonia of both lower lobes due to methicillin resistant Staphylococcus aureus (MRSA) ([facility] [address]    GI bleed    Severe malnutrition (HCC)    Chest pain    Chronic respiratory failure with hypoxia (HCC)    Essential hypertension    Anemia    Acute respiratory failure (HCC)    Acute respiratory distress    Volume overload    Demand ischemia (HCC)    GERD (gastroesophageal reflux disease)    Acute respiratory failure with hypoxia and hypercapnia (HCC)    Shock ([address]    Pneumonia due to infectious organism    Acute on chronic respiratory failure with hypoxia and hypercapnia (HCC)    Acute exacerbation of chronic obstructive pulmonary disease (COPD) ([address]    Acute on chronic respiratory failure with hypoxemia (HCC)     Allergies:  Latex and Codeine     Temp max: 98.5    Recent Labs     09/23/21  0529 09/23/21  1632   BUN 14 14   CREATININE 0.7 0.9   WBC 10.3 18.4* Intake/Output Summary (Last 24 hours) at 9/24/2021 0407  Last data filed at 9/24/2021 0342  Gross per 24 hour   Intake 140 ml   Output 85 ml   Net 55 ml     Culture Date      Source                       Results      Ht Readings from Last 1 Encounters:   09/24/21 5' 2\" (1.575 m)        Wt Readings from Last 1 Encounters:   09/24/21 115 lb 2 oz (52.2 kg)       Body mass index is 21.06 kg/m². Estimated Creatinine Clearance: 39 mL/min (based on SCr of 0.9 mg/dL). Assessment/Plan:  Regimen: 1000 mg IV every 24 hours. Start time: 05:00 on 09/24/2021  Exposure target: AUC24 (range)400-600 mg/L.hr   AUC24,ss: 464 mg/L.hr  Probability of AUC24 > 400: 68 %  Ctrough,ss: 13.7 mg/L  Probability of nephrotoxicity: 9 %    Timing of trough level will be determined based on culture results, renal function, and clinical response. Vanco trough ordered for 9/26 @ 04:00    Thank you for the consult. Will continue to follow.      Beth Queen, [facility]

## 2021-09-24 NOTE — CARE COORDINATION
INTERDISCIPLINARY PLAN OF CARE CONFERENCE    Date/Time: 9/24/2021 5:19 PM  Completed by: Pascual Pastrana RN, Case Management      Patient Name:  Brion Bamberger  YOB: 1940  Admitting Diagnosis: Acute on chronic respiratory failure with hypoxemia [facility]) [J96.21]     Admit Date/Time:  9/23/2021  4:12 PM    Chart reviewed. Interdisciplinary team contacted or reviewed plan related to patient progress and discharge plans. Disciplines included Case Management, Nursing, and Dietitian. Current Status:ongoing  PT/OT recommendation for discharge plan of care: was SNF the last admission that was less than 24 hours ago. Expected D/C Disposition:  Skilled nursing facility  Confirmed plan with patient and/or family Yes confirmed with: (name) pt and her daughter, Andreas Shrestha  Met with: pt and her daughterAndreas  Discharge Plan Comments: Reviewed chart. Role of discharge planner explained and patient and Arjunonne verbalized understanding. Pt is a readmission less than 24 hours after d/c.  PT/OT recommended SNF at that time. Pt went home with [facility] with PT/OT/SN/SW/HA/PC (that was opened with [facility] as a full code) and pt went home with 6 more days of IV abx of cefepime and vanc. Pt and Nataliae spoke with both Dr. Wade Wiggins and Dr. Olga Garner and they both recommended going to a SNF with respiratory. CM provided a SNF list for pt and Juan. Pt asks for CM to ask Nataliae for where she should have pt go for SNF. Andreas Shrestha will call CM over the weekend and let CM know if they would like [facility] or [facility]. Pt has Medicare and [employer]. Pt is active with [facility] for home O2 at 2-2.5L at baseline. The Asteril  was to be delivered to pt's room at discharge yesterday. Andreas Shrestha has brought the Asteril to the hospital and it is bedside. Await PT/OT eval    Pt will need a rapid covid prior to d/c.      Home O2 in place on admit: Yes  Pt informed of need to bring portable home O2 tank on day of discharge for nursing to connect prior to leaving:  Yes  Verbalized agreement/Understanding:   Yes

## 2021-09-24 NOTE — PROGRESS NOTES
RESPIRATORY THERAPY ASSESSMENT    Name:  Federico Nazario  Medical Record Number:  [id number]  Age: [de-identified] y.o. Gender: female  : 1940  Today's Date:  2021  Room:  [facility]300-    Assessment     Is the patient being admitted for a COPD or Asthma exacerbation? No   (If yes the patient will be seen every 4 hours for the first 24 hours and then reassessed)    Patient Admission Diagnosis      Allergies  Allergies   Allergen Reactions    Latex      Burning      Codeine      \"hallucinations\"       Minimum Predicted Vital Capacity:               Actual Vital Capacity:                    Pulmonary History:COPD and Asthma, PNA. Home Oxygen Therapy:  2 liters/min via nasal cannula  Home Respiratory Therapy:Albuterol, Albuterol/Ipratropium Bromide HHN, Budesonide, Budesonide/Formoterol  and Tiotropium Bromide   Current Respiratory Therapy:  Duoneb Q4W/A,  Spiriva, Pulmicort. Treatment Type: HHN  Medications: Albuterol/Ipratropium    Respiratory Severity Index(RSI)   Patients with orders for inhalation medications, oxygen, or any therapeutic treatment modality will be placed on Respiratory Protocol. They will be assessed with the first treatment and at least every 72 hours thereafter. The following severity scale will be used to determine frequency of treatment intervention.     Smoking History: Pulmonary Disease or Smoking History, Greater than 15 pack year = 2    Social History  Social History     Tobacco Use    Smoking status: Former Smoker     Packs/day: 0.50     Years: 50.00     Pack years: 25.00     Types: Cigarettes     Quit date: 2019     Years since quittin.0    Smokeless tobacco: Never Used    Tobacco comment: advised to quit   Vaping Use    Vaping Use: Never assessed   Substance Use Topics    Alcohol use: No    Drug use: No       Recent Surgical History: None = 0  Past Surgical History  Past Surgical History:   Procedure Laterality Date    BRONCHOSCOPY  2019    Dr. Michelle Mckeon w/BAL    CARDIAC CATHETERIZATION  09/05/2019    Dr. Ankita Ly COLONOSCOPY N/A 2/24/2020    COLONOSCOPY DIAGNOSTIC performed by Rima Michelle DO at [address] N/A 9/19/2019    OFF PUMP CORONARY ARTERY BYPASS GRAFTING X2, INTERNAL MAMMARY ARTERY, SAPHENOUS VEIN GRAFT performed by Saranya Nelson MD at [facility] CATH  9/20/2021    IR MIDLINE CATH 9/20/2021 [facility] SPECIAL PROCEDURES    MASTECTOMY, PARTIAL Left     SIGMOIDOSCOPY  02/27/2020    4 bands    SIGMOIDOSCOPY N/A 2/27/2020    FLEX SIG W/ BANDING SIGMOIDOSCOPY DIAGNOSTIC FLEXIBLE performed by Rima Michelle DO at [address]       Level of Consciousness: Alert, Oriented, and Cooperative = 0    Level of Activity: Mostly sedentary, minimal walking = 2    Respiratory Pattern: Dyspnea with exertion;Irregular pattern;or RR less than 6 = 2    Breath Sounds: Diminshed bilaterally and/or crackles = 2    Sputum  Sputum Color: Cloudy, Green, Creamy, Tenacity: Thin, Sputum How Obtained: Spontaneous cough  Cough: Strong, spontaneous, non-productive = 0    Vital Signs   BP (!) 149/73   Pulse 83   Temp 98.5 °F (36.9 °C) (Bladder)   Resp 20   Ht 5' 2\" (1.575 m)   Wt 115 lb 2 oz (52.2 kg)   SpO2 99%   BMI 21.06 kg/m²   SPO2 (COPD values may differ): 88-89% on room air or greater than 92% on FiO2 28- 35% = 2    Peak Flow (asthma only): not applicable = 0    RSI: 2-24 = TID (three times daily) and Q4hr PRN for dyspnea        Plan       Goals: medication delivery    Patient/caregiver was educated on the proper method of use for Respiratory Care Devices:        Level of patient/caregiver understanding able to:   ? Verbalize understanding   ? Demonstrate understanding       ? Teach back        ? Needs reinforcement       ? No available caregiver               ? Other:     Response to education:       Is patient being placed on Home Treatment Regimen?   No     Does the patient have everything they need prior to discharge? NA     Comments: Chart reviewed and patient assessed. Plan of Care: Duoneb Q4W/A,  Spiriva Daily,  Pulmicort BID,    Electronically signed by Molly Daniels RCP on 9/24/2021 at 3:55 AM    Respiratory Protocol Guidelines     1. Assessment and treatment by Respiratory Therapy will be initiated for medication and therapeutic interventions upon initiation of aerosolized medication. 2. Physician will be contacted for respiratory rate (RR) greater than 35 breaths per minute. Therapy will be held for heart rate (HR) greater than 140 beats per minute, pending direction from physician. 3. Bronchodilators will be administered via Metered Dose Inhaler (MDI) with spacer when the following criteria are met:  a. Alert and cooperative     b. HR < 140 bpm  c. RR < 30 bpm                d. Can demonstrate a 2-3 second inspiratory hold  4. Bronchodilators will be administered via Hand Held Nebulizer [facility]) to patients when ANY of the following criteria are met  a. Incognizant or uncooperative          b. Patients treated with HHN at Home        c. Unable to demonstrate proper use of MDI with spacer     d. RR > 30 bpm   5. Bronchodilators will be delivered via Metered Dose Inhaler (MDI), HHN, Aerogen to intubated patients on mechanical ventilation. 6. Inhalation medication orders will be delivered and/or substituted as outlined below. Aerosolized Medications Ordering and Administration Guidelines:    1. All Medications will be ordered by a physician, and their frequency and/or modality will be adjusted as defined by the patients Respiratory Severity Index (RSI) score. 2. If the patient does not have documented COPD, consider discontinuing anticholinergics when RSI is less than 9.  3. If the bronchospasm worsens (increased RSI), then the bronchodilator frequency can be increased to a maximum of every 4 hours.   If greater than every 4 hours is required, the physician will be

## 2021-09-24 NOTE — PROGRESS NOTES
09/24/21 0247   NIV Type   Skin Assessment Clean, dry, & intact   Skin Protection for O2 Device Yes   NIV Started/Stopped On   Equipment Type V60   Mode Bilevel   Mask Type Full face mask   Mask Size Medium   Settings/Measurements   IPAP 16 cmH20   CPAP/EPAP 8 cmH2O   Rate Ordered 14   Resp 22   FiO2  35 %   Vt Exhaled 493 mL   Mask Leak (lpm) 29 lpm   Comfort Level Good   Using Accessory Muscles No   SpO2 100   Alarm Settings   Alarms On Y

## 2021-09-24 NOTE — FLOWSHEET NOTE
09/24/21 1152   Vital Signs   Temp 98 °F (36.7 °C)   Temp Source Oral   Pulse 77   Heart Rate Source Monitor   Resp 24   /73   BP Location Right upper arm   Patient Position Semi fowlers   Level of Consciousness Alert (0)   MEWS Score 2   Patient Currently in Pain No   Oxygen Therapy   SpO2 100 %   O2 Device Nasal cannula   O2 Flow Rate (L/min) 6 L/min       Patient arrived to room 303 via bed in stable condition. Currently on 6L HFNC. Diet order placed and dietary contacted, personal fan given. No other needs noted at this time.

## 2021-09-24 NOTE — PROGRESS NOTES
09/23/21 3624   NIV Type   $NIV $Daily Charge   Equipment Type v60   Mode Bilevel   Mask Type Full face mask   Mask Size Medium   Settings/Measurements   IPAP 16 cmH20   CPAP/EPAP 8 cmH2O   Rate Ordered 14   Resp 21   FiO2  35 %   Vt Exhaled 703 mL   Minute Volume 12.2 Liters   Mask Leak (lpm) 14 lpm   Comfort Level Good   Using Accessory Muscles No   SpO2 98   Alarm Settings   Alarms On Y   Press Low Alarm 10 cmH2O   High Pressure Alarm 40 cmH2O   Delay Alarm 20 sec(s)   Resp Rate Low Alarm 10   High Respiratory Rate 40 br/min   Vitals   SpO2 99 %

## 2021-09-25 LAB
ANION GAP SERPL CALCULATED.3IONS-SCNC: 8 MMOL/L (ref 3–16)
BUN BLDV-MCNC: 23 MG/DL (ref 7–20)
CALCIUM SERPL-MCNC: 8.5 MG/DL (ref 8.3–10.6)
CHLORIDE BLD-SCNC: 107 MMOL/L (ref 99–110)
CO2: 22 MMOL/L (ref 21–32)
CREAT SERPL-MCNC: 1.2 MG/DL (ref 0.6–1.2)
GFR AFRICAN AMERICAN: 52
GFR NON-AFRICAN AMERICAN: 43
GLUCOSE BLD-MCNC: 77 MG/DL (ref 70–99)
HCT VFR BLD CALC: 29.7 % (ref 36–48)
HEMOGLOBIN: 9.4 G/DL (ref 12–16)
MCH RBC QN AUTO: 28.3 PG (ref 26–34)
MCHC RBC AUTO-ENTMCNC: 31.8 G/DL (ref 31–36)
MCV RBC AUTO: 89 FL (ref 80–100)
PDW BLD-RTO: 20.2 % (ref 12.4–15.4)
PLATELET # BLD: 175 K/UL (ref 135–450)
PMV BLD AUTO: 8.2 FL (ref 5–10.5)
POTASSIUM SERPL-SCNC: 3.6 MMOL/L (ref 3.5–5.1)
RBC # BLD: 3.34 M/UL (ref 4–5.2)
SODIUM BLD-SCNC: 137 MMOL/L (ref 136–145)
VANCOMYCIN RANDOM: 24.7 UG/ML
WBC # BLD: 9.3 K/UL (ref 4–11)

## 2021-09-25 PROCEDURE — 97530 THERAPEUTIC ACTIVITIES: CPT

## 2021-09-25 PROCEDURE — 6370000000 HC RX 637 (ALT 250 FOR IP): Performed by: HOSPITALIST

## 2021-09-25 PROCEDURE — 97535 SELF CARE MNGMENT TRAINING: CPT

## 2021-09-25 PROCEDURE — 6370000000 HC RX 637 (ALT 250 FOR IP): Performed by: INTERNAL MEDICINE

## 2021-09-25 PROCEDURE — 97166 OT EVAL MOD COMPLEX 45 MIN: CPT

## 2021-09-25 PROCEDURE — 99232 SBSQ HOSP IP/OBS MODERATE 35: CPT | Performed by: INTERNAL MEDICINE

## 2021-09-25 PROCEDURE — 2700000000 HC OXYGEN THERAPY PER DAY

## 2021-09-25 PROCEDURE — 85027 COMPLETE CBC AUTOMATED: CPT

## 2021-09-25 PROCEDURE — 2060000000 HC ICU INTERMEDIATE R&B

## 2021-09-25 PROCEDURE — 97161 PT EVAL LOW COMPLEX 20 MIN: CPT

## 2021-09-25 PROCEDURE — 80048 BASIC METABOLIC PNL TOTAL CA: CPT

## 2021-09-25 PROCEDURE — 94761 N-INVAS EAR/PLS OXIMETRY MLT: CPT

## 2021-09-25 PROCEDURE — 99233 SBSQ HOSP IP/OBS HIGH 50: CPT | Performed by: INTERNAL MEDICINE

## 2021-09-25 PROCEDURE — 6360000002 HC RX W HCPCS: Performed by: HOSPITALIST

## 2021-09-25 PROCEDURE — 94640 AIRWAY INHALATION TREATMENT: CPT

## 2021-09-25 PROCEDURE — 80202 ASSAY OF VANCOMYCIN: CPT

## 2021-09-25 PROCEDURE — 36415 COLL VENOUS BLD VENIPUNCTURE: CPT

## 2021-09-25 PROCEDURE — 2580000003 HC RX 258: Performed by: HOSPITALIST

## 2021-09-25 RX ORDER — BUSPIRONE HYDROCHLORIDE 5 MG/1
2.5 TABLET ORAL ONCE
Status: DISCONTINUED | OUTPATIENT
Start: 2021-09-25 | End: 2021-09-27 | Stop reason: HOSPADM

## 2021-09-25 RX ADMIN — METOPROLOL TARTRATE 25 MG: 25 TABLET, FILM COATED ORAL at 21:48

## 2021-09-25 RX ADMIN — SODIUM CHLORIDE, PRESERVATIVE FREE 10 ML: 5 INJECTION INTRAVENOUS at 22:10

## 2021-09-25 RX ADMIN — IPRATROPIUM BROMIDE AND ALBUTEROL SULFATE 1 AMPULE: .5; 2.5 SOLUTION RESPIRATORY (INHALATION) at 19:44

## 2021-09-25 RX ADMIN — IPRATROPIUM BROMIDE AND ALBUTEROL SULFATE 1 AMPULE: .5; 2.5 SOLUTION RESPIRATORY (INHALATION) at 11:38

## 2021-09-25 RX ADMIN — ENOXAPARIN SODIUM 40 MG: 40 INJECTION SUBCUTANEOUS at 08:20

## 2021-09-25 RX ADMIN — PANTOPRAZOLE SODIUM 20 MG: 20 TABLET, DELAYED RELEASE ORAL at 08:24

## 2021-09-25 RX ADMIN — IPRATROPIUM BROMIDE AND ALBUTEROL SULFATE 1 AMPULE: .5; 2.5 SOLUTION RESPIRATORY (INHALATION) at 07:08

## 2021-09-25 RX ADMIN — TIOTROPIUM BROMIDE INHALATION SPRAY 2 PUFF: 3.12 SPRAY, METERED RESPIRATORY (INHALATION) at 07:13

## 2021-09-25 RX ADMIN — CLOPIDOGREL BISULFATE 75 MG: 75 TABLET ORAL at 08:24

## 2021-09-25 RX ADMIN — BUDESONIDE 500 MCG: 0.5 SUSPENSION RESPIRATORY (INHALATION) at 19:44

## 2021-09-25 RX ADMIN — Medication 5 MG: at 21:48

## 2021-09-25 RX ADMIN — METOPROLOL TARTRATE 25 MG: 25 TABLET, FILM COATED ORAL at 08:22

## 2021-09-25 RX ADMIN — MUPIROCIN: 20 OINTMENT TOPICAL at 08:32

## 2021-09-25 RX ADMIN — SERTRALINE HYDROCHLORIDE 50 MG: 50 TABLET ORAL at 08:24

## 2021-09-25 RX ADMIN — PREDNISONE 40 MG: 20 TABLET ORAL at 08:21

## 2021-09-25 RX ADMIN — BUSPIRONE HYDROCHLORIDE 5 MG: 5 TABLET ORAL at 10:07

## 2021-09-25 RX ADMIN — BUSPIRONE HYDROCHLORIDE 5 MG: 5 TABLET ORAL at 14:06

## 2021-09-25 RX ADMIN — IPRATROPIUM BROMIDE AND ALBUTEROL SULFATE 1 AMPULE: .5; 2.5 SOLUTION RESPIRATORY (INHALATION) at 23:32

## 2021-09-25 RX ADMIN — SODIUM CHLORIDE, PRESERVATIVE FREE 10 ML: 5 INJECTION INTRAVENOUS at 08:27

## 2021-09-25 RX ADMIN — CEFEPIME 2000 MG: 2 INJECTION, POWDER, FOR SOLUTION INTRAVENOUS at 06:39

## 2021-09-25 RX ADMIN — BUDESONIDE 500 MCG: 0.5 SUSPENSION RESPIRATORY (INHALATION) at 07:09

## 2021-09-25 RX ADMIN — ATORVASTATIN CALCIUM 20 MG: 10 TABLET, FILM COATED ORAL at 21:48

## 2021-09-25 RX ADMIN — ACETAMINOPHEN 650 MG: 325 TABLET ORAL at 15:49

## 2021-09-25 RX ADMIN — BUSPIRONE HYDROCHLORIDE 5 MG: 5 TABLET ORAL at 21:48

## 2021-09-25 RX ADMIN — ACETAMINOPHEN 650 MG: 325 TABLET ORAL at 21:50

## 2021-09-25 RX ADMIN — IPRATROPIUM BROMIDE AND ALBUTEROL SULFATE 1 AMPULE: .5; 2.5 SOLUTION RESPIRATORY (INHALATION) at 14:55

## 2021-09-25 RX ADMIN — ROFLUMILAST 250 MCG: 500 TABLET ORAL at 08:22

## 2021-09-25 ASSESSMENT — PAIN SCALES - GENERAL
PAINLEVEL_OUTOF10: 0
PAINLEVEL_OUTOF10: 3
PAINLEVEL_OUTOF10: 1
PAINLEVEL_OUTOF10: 5

## 2021-09-25 ASSESSMENT — PAIN - FUNCTIONAL ASSESSMENT: PAIN_FUNCTIONAL_ASSESSMENT: PREVENTS OR INTERFERES SOME ACTIVE ACTIVITIES AND ADLS

## 2021-09-25 ASSESSMENT — PAIN DESCRIPTION - LOCATION: LOCATION: BACK

## 2021-09-25 ASSESSMENT — PAIN DESCRIPTION - DESCRIPTORS: DESCRIPTORS: DISCOMFORT;CONSTANT

## 2021-09-25 ASSESSMENT — PAIN DESCRIPTION - FREQUENCY: FREQUENCY: INTERMITTENT

## 2021-09-25 ASSESSMENT — PAIN SCALES - WONG BAKER
WONGBAKER_NUMERICALRESPONSE: 0

## 2021-09-25 ASSESSMENT — PAIN DESCRIPTION - ORIENTATION: ORIENTATION: MID

## 2021-09-25 ASSESSMENT — PAIN DESCRIPTION - PAIN TYPE: TYPE: CHRONIC PAIN

## 2021-09-25 ASSESSMENT — PAIN DESCRIPTION - PROGRESSION: CLINICAL_PROGRESSION: NOT CHANGED

## 2021-09-25 ASSESSMENT — PAIN DESCRIPTION - DIRECTION: RADIATING_TOWARDS: NON

## 2021-09-25 ASSESSMENT — PAIN DESCRIPTION - ONSET: ONSET: ON-GOING

## 2021-09-25 NOTE — PROGRESS NOTES
Inpatient Occupational Therapy  Evaluation and Treatment    Unit: PCU  Date:  9/25/2021  Patient Name:    Luci Parekh  Admitting diagnosis:  Acute on chronic respiratory failure with hypoxemia [facility]) [J96.21]  Admit Date:  9/23/2021  Precautions/Restrictions/WB Status/ Lines/ Wounds/ Oxygen: Fall risk, Bed/chair alarm, Lines -IV, Supplemental O2 (3 ) and Bill catheter, Telemetry and Continuous pulse oximetry    Treatment Time:  830-900; 7203-6005  Treatment Number: 1     Billable Treatment Time: 39 minutes   Total Treatment Time:   49   minutes    Patient Goals for Therapy:  \" to walk \"      Discharge Recommendations: SNF  DME needs for discharge: defer to facility       Therapy recommendations for staff:  Supervision  To EOB    History of Present Illness: H&P, 9/23/2021, Dacio:   \" [de-identified] y.o. female with end stage COPD, discharged earlier today. She states that she was on bipap at home, has had some nausea, productive cough, but denies fever, chest pain. She is unable to recall events leading to readmission, is currently seen in the ICU on a NRB, awake, alert, oriented in no distress. \"     PMHx: NADJA, asthma, COPD, GERD, hyperlipidemia, HTN, MRSA, OA     Home Health S4 Level Recommendation:  NA    AM-PAC Score: AM-PAC Inpatient Daily Activity Raw Score: 13  Pt scored a 13/24 on the AM-PAC ADL Inpatient form. Current research shows that an AM-PAC score of 17 or less is typically not associated with a discharge to the patient's home setting. Information obtained from OT/PT evaluation on 9/22/2021 and edited as needed. Preadmission Environment    Pt.  Jeny kaye (Son who works just across the [employer])  Home environment:    one story home  Steps to enter first floor: 1 steps to enter and no handrail  Steps to second floor: N/A  Bathroom: tub/shower unit, and standard height commode,tub transfer bench  Equipment owned: SW, wc (manual), lift chair, hospital bed  (doesn't go up and down), home O2 (2L) continous and lifealert     Preadmission Status:  Pt. Able to drive: No  Pt Fully independent with ADLs: No  Pt. Required assistance from family for: Cleaning, Cooking and [address]  Pt. independent for transfers and gait and walked with No Device  History of falls No    Pain   No     Cognition    A&O Person, Place and Situation   Able to follow 1 step commands     Subjective  Patient lying supine in bed with family at bedside. Pt agreeable to this OT eval & tx. States she is having a hard time recovering this time  Reports that she is struggling with anxiety    Upper Extremity ROM:   WFL    Upper Extremity Strength:    BUE strength impaired but not formally assessed w/ MMT    Upper Extremity Sensation:    NT    Upper Extremity Proprioception:  NT    Coordination and Tone:  WFL    Balance  Sitting:                      Good ; Supervision  Comments: sat EOB ~2.5 minutes     Standing:       Not tested;  Not Tested  Comments: increased WOB at EOB     Bed Mobility   Supine to Sit:                      Supervision with HOB elevated,used handrail  Sit to Supine:                      Supervision  Rolling:                     Independent  Scooting in sitting:    Independent  Scooting in supine:  Not Tested     Transfer Training                Sit to stand:              Not Tested  Stand to sit:              Not Tested  Bed to Chair:                       Not Tested with use of N/A    Activities of Daily Living:   UB Dressing:   Not Tested  LB Dressing:    maximal assistance (75%)  UB Bathing:  Not Tested  LB Bathing:  Not Tested  Feeding:  SBA  Grooming:   Not Tested  Toileting:  Not Tested    Activity Tolerance:   Pt completed therapy session with SOB noted with rest,activity    HR SpO2 Patient Comments   Supine, at rest 109bpm 96% on 3L Increased anxiety,requests that we come later on   Seated at EOB 95bpm 95% Less anxiety     Positioning Needs   Pt in bed, alarm set, positioned in proper neutral alignment and pressure relief provided. Call light provided and all needs within reach      Exercise / Activities Initiated:   NT    Patient/Family Education:   Role of OT  Recommendations for DC    Assessment of Deficits: Pt seen for Occupational therapy evaluation in acute care setting. Pt demonstrated decreased Activity tolerance, ADLs, IADLs, Balance , Bed mobility, Strength and Transfers. Pt functioning below baseline and will likely benefit from skilled occupational therapy services to maximize safety and independence. Goal(s): To be met in 3 Visits:  1). Bed to [facility]: CGA    To be met in 5 Visits:  1). Supine to/from Sit:  Independent  2). Upper Body Bathing:   SBA  3). Lower Body Bathing:   minimal assistance (25%)  4). Upper Body Dressing:  SBA  5). Lower Body Dressing:  minimal assistance (25%)  6). Pt to demonstrate UE exs x 15 reps with minimal cues    Rehabilitation Potential:  Fair for goals listed above. Strengths for achieving goals include: Pt motivated, Family Support and Pt cooperative  Barriers to achieving goals include:  Complexity of condition and Weakness     Plan: To be seen 3-5 x/wk while in acute care setting for strengthening, bed mobility, transfer training, family/patient education, ADL/IADL retraining and energy conservation.       KELL Arauz, OTR/L   [id number]           If patient discharges from this facility prior to next visit, this note will serve as the Discharge Summary

## 2021-09-25 NOTE — PROGRESS NOTES
Vancomycin random level from 9/25 @ 0506 = 24.7 mg/L.  BUN/SCr = 23/1.2    Current dose = Pulse dosing. No vancomycin dose today. Random levels ordered daily with AM labs.   Jacey Ty [facility]

## 2021-09-25 NOTE — PROGRESS NOTES
Patient resting comfortably with pillow under left side. O2 @ 3.5L per NC. Sat rate 97. Respirations with accessory muscle use.  Nicolle Rinaldi RN

## 2021-09-25 NOTE — PROGRESS NOTES
Inpatient Physical Therapy Evaluation and Treatment    Unit: PCU  Date:  9/25/2021  Patient Name:    Liliane Cesar  Admitting diagnosis:  Acute on chronic respiratory failure with hypoxemia [facility]) [J96.21]  Admit Date:  9/23/2021  Precautions/Restrictions/WB Status/ Lines/ Wounds/ Oxygen: Fall risk, Bed/chair alarm, Lines -IV, Supplemental O2 (3 ) and Bill catheter, Telemetry and Continuous pulse oximetry  Prefers pm session  Treatment Time:  8:30-9:00& 10:26-10:45  Treatment Number:  1   Timed Code Treatment Minutes: 39 minutes  Total Treatment Minutes:  49  minutes    Patient Goals for Therapy: \" to walk \"          Discharge Recommendations: SNF  DME needs for discharge: defer to facility       Therapy recommendation for EMS Transport: requires transport by cot due to inability to transfer without lift equiptment    Therapy recommendations for staff:   Supervision  To EOB    History Of Present Illness:      Per   [de-identified] y.o. female with end stage COPD, discharged earlier today. She states that she was on bipap at home, has had some nausea, productive cough, but denies fever, chest pain. She is unable to recall events leading to readmission, is currently seen in the ICU on a NRB, awake, alert, oriented in no distress. \"    Home Health S4 Level Recommendation:  NA  AM-PAC Mobility Score    AM-PAC Inpatient Mobility Raw Score : 14    Iris Smith scored a 14/24 on the AM-PAC short mobility form. Current research shows that an AM-PAC score of 17 or less is typically not associated with a discharge to the patient's home setting. details. If patient discharges prior to next session this note will serve as a discharge summary. Please see below for the latest assessment towards goals. Preadmission Environment    Pt.  [facility] [address] family (Son who works just across the [employer] [place])  Home environment:    one story home  Steps to enter first floor: 1 steps to enter and no handrail  Steps to second floor: N/A  Bathroom: tub/shower unit, and standard height commode,tub transfer bench  Equipment owned: SW, wc (manual), lift chair, hospital bed  (doesn't go up and down), home O2 (2L) continous and lifealert     Preadmission Status:  Pt. Able to drive: No  Pt Fully independent with ADLs: No  Pt. Required assistance from family for: Cleaning, Cooking and [address]  Pt. independent for transfers and gait and walked with No Device  History of falls No      Pain   No    Cognition    A&O Person, Place and Situation   Able to follow 1 step commands    Subjective  Patient lying supine in bed with family at bedside. Pt agreeable to this PT eval & tx. States she is having a hard time recovering this time  Reports that she is struggling with anxiety  Upper Extremity ROM/Strength  Please see OT evaluation. Lower Extremity ROM / Strength   AROM WFL: Yes  ROM limitations:     Strength not formally assessed with mmt. WFL to complete bed mobility and transfers to EOB    Lower Extremity Sensation    NT    Lower Extremity Proprioception:   NT    Coordination and Tone  NT    Balance  Sitting:  Good ; Supervision  Comments: sat EOB ~2.5 minutes    Standing: Not tested; Not Tested  Comments: increased WOB at EOB    Bed Mobility   Supine to Sit:    Supervision with HOB elevated,used handrail  Sit to Supine:   Supervision  Rolling:   Independent  Scooting in sitting: Independent  Scooting in supine:  Not Tested    Transfer Training     Sit to stand:   Not Tested  Stand to sit:   Not Tested  Bed to Chair:   Not Tested with use of N/A    Gait gait deferred due to difficulty with transfers; pt ambulated 0 ft.      Stair Training deferred, pt unsafe/ not appropriate to complete stairs at this time    Activity Tolerance   Pt completed therapy session with SOB noted with rest,activity   HR SpO2 Patient Comments   Supine, at rest 109bpm 96% on 3L Increased anxiety,requests that we come later on   Seated at EOB 95bpm 95% Less anxiety Positioning Needs   Pt in bed, alarm set, positioned in proper neutral alignment and pressure relief provided. Call light provided and all needs within reach    Exercises Initiated  Roger deferred secondary to treatment focus on functional mobility and increased WOB      Other      Patient/Family Education   Pt educated on role of inpatient PT, POC, importance of continued activity, DC recommendations, safety awareness, energy conservation, pacing activity and calling for assist with mobility. Assessment  Pt seen for Physical Therapy evaluation in acute care setting. Pt demonstrated decreased Activity tolerance, Safety and Strength as well as decreased independence with Ambulation and Transfers. Patient will benefit from skilled intervention while in acute care. Recommending SNF upon discharge as patient functioning well below baseline, demonstrates good rehab potential and unable to return home due to inability to negotiate stairs to enter home/bedroom/bathroom, burden of care beyond caregiver ability and home environment not conducive to patient recovery. Goals : To be met in 3 visits:  1). Independent with LE Ex x 10 reps  2.) Bed to chair: CGA    To be met in 6 visits:  1). Supine to/from sit: Independent  2). Sit to/from stand: Supervision  3). Bed to chair: Supervision  4). Gait: Ambulate 50 ft.  with  CGA and use of rolling walker (RW)  5). Tolerate B LE exercises 3 sets of 10-15 reps  6).   Ascend/descend 1 steps with Min A  with use of hand rail unilateral and LRAD (least restrictive assistive device)    Rehabilitation Potential: Fair  Strengths for achieving goals include:   Pt motivated, Family Support and Pt cooperative   Barriers to achieving goals include:    Complexity of condition and Weakness    Plan    To be seen 3-5 x / week  while in acute care setting for therapeutic exercises, bed mobility, transfers, progressive gait training, balance training, and family/patient education. Signature: Johnathan Tyler, PT #[id number]     If patient discharges from this facility prior to next visit, this note will serve as the Discharge Summary.

## 2021-09-25 NOTE — PROGRESS NOTES
09/24/21 3710   NIV Type   $NIV $Daily Charge   Skin Assessment Clean, dry, & intact   Skin Protection for O2 Device Yes   Equipment Type v60   Mode Bilevel   Mask Type Full face mask   Mask Size Medium   Settings/Measurements   IPAP 16 cmH20   CPAP/EPAP 8 cmH2O   Rate Ordered 14   Resp 20   FiO2  35 %   Vt Exhaled 467 mL   Minute Volume 9.8 Liters   Mask Leak (lpm) 14 lpm   Comfort Level Good   Using Accessory Muscles No   SpO2 96   Patient Observation   Observations spo2 96% on 35% bipap

## 2021-09-25 NOTE — PROGRESS NOTES
Shift report given to nurse Rae Harmon at bedside. Patient care handed off in stable condition at this time.  Lane Flores RN

## 2021-09-25 NOTE — PROGRESS NOTES
Pulmonary Progress Note  CC: End-stage COPD    Subjective: used bipap overnight       EXAM: /81   Pulse 106   Temp 98.1 °F (36.7 °C) (Oral)   Resp 20   Ht 5' 2\" (1.575 m)   Wt 116 lb 3.2 oz (52.7 kg)   SpO2 98%   BMI 21.25 kg/m²  on 3 L  Constitutional:  No acute distress. Eyes: PERRL. Conjunctivae anicteric. ENT: Normal nose. Normal tongue. Neck:  Trachea is midline. No thyroid tenderness. Respiratory: No accessory muscle usage. decreased breath sounds. No wheezes. No rales. No Rhonchi. Cardiovascular: Normal S1S2. No digit clubbing. No digit cyanosis. No LE edema. Psychiatric: No anxiety or Agitation. Alert and Oriented to person, place and time.     Scheduled Meds:   busPIRone  2.5 mg Oral Once    cefepime  2,000 mg IntraVENous Q12H    atorvastatin  20 mg Oral Nightly    budesonide  0.5 mg Nebulization BID    busPIRone  5 mg Oral TID    clopidogrel  75 mg Oral Daily    metoprolol tartrate  25 mg Oral BID    pantoprazole  20 mg Oral Daily    polyethylene glycol  17 g Oral BID    Roflumilast  250 mcg Oral Daily    tiotropium  2 puff Inhalation Daily    sodium chloride flush  5-40 mL IntraVENous 2 times per day    enoxaparin  40 mg SubCUTAneous Daily    ipratropium-albuterol  1 ampule Inhalation Q4H While awake    sertraline  50 mg Oral Daily    mupirocin   Nasal BID    vancomycin (VANCOCIN) intermittent dosing (placeholder)   Other RX Placeholder    predniSONE  40 mg Oral Daily     Continuous Infusions:   sodium chloride       PRN Meds:  fluticasone, melatonin, sodium chloride flush, sodium chloride, ondansetron **OR** ondansetron, polyethylene glycol, acetaminophen **OR** acetaminophen, ipratropium-albuterol, iopamidol    Labs:  CBC:   Recent Labs     09/23/21  0529 09/23/21  1632 09/25/21  0506   WBC 10.3 18.4* 9.3   HGB 11.0* 13.1 9.4*   HCT 34.6* 42.4 29.7*   MCV 88.4 90.1 89.0    320 175     BMP:   Recent Labs     09/23/21  0529 09/23/21  1632 09/25/21  0506  140 137   K 3.3* 4.7 3.6    108 107   CO2 25 23 22   BUN 14 14 23*   CREATININE 0.7 0.9 1.2       CTPA:   Lungs/pleura: There is advanced centrilobular emphysema. Gweneth Olmstead may be a   superimposed interstitial opacities. There are small bilateral pleural   effusions, layer to the apices.  Dependent atelectasis is noted in both lower   lobes.          ASSESSMENT:  · Acute on chronic hypoxic respiratory failure  · Current life threatening hypercarbic respiratory failure  · End stage COPD  · CHF    PLAN:  · Use home astral unit with avaps settings tonight  · Supplemental oxygen to maintain SaO2 >92%; wean as tolerated  · Intensive inhaled bronchodilator therapy. · Prednisone taper    · Day 10/10 Vanc and Cefepime.    · The pt will need placement into a a vent capable nursing home for supervision of nightly and PRN AVAPS to keep the pt out of the hospital or from sudden death at home

## 2021-09-25 NOTE — PROGRESS NOTES
Comprehensive Nutrition Assessment    Type and Reason for Visit:  Initial (no + screen, however pt is a readmit + poor po intake x 6 days during previous admission (9/15-9/23))    Nutrition Recommendations/Plan:   1. Modify diet to ADULT DIET; Easy to Chew  2. Add Ensure Enlive with all meals + HS snack  3. Monitor appetite, meal + ONS intake. 4. Monitor weight trends, bowel function, nutrition related labs, respiratory function and POC. Nutrition Assessment:  patient is nutritionally compromised AEB pt is a reamit (admitted to [facility] 9/15-9/23 and returned to ER on 9/23), multiple recent hospitalizations within the past month, poor appetite/po intake x 1 month PTA, mild fat loss/severe muscle loss, and she is at risk for further compromise d/t  respiratory dysfunction r/t end stage COPD, increased nutrient needs r/t wounds, underweight BMI status, and severe malnutrition dx; Will modify diet to ADULT DIET; Easy to Chew and start Ensure enlive with meals + evening snack    Malnutrition Assessment:  Malnutrition Status:  Severe malnutrition    Context:  Chronic Illness     Findings of the 6 clinical characteristics of malnutrition:  Energy Intake:  7 - 75% or less estimated energy requirements for 1 month or longer  Weight Loss:  No significant weight loss     Body Fat Loss:  1 - Mild body fat loss Triceps, Orbital   Muscle Mass Loss:  7 - Severe muscle mass loss (LINDA hand d/t BUE edema; clavicle- mild) Calf (gastrocnemius), Thigh (quadraceps), Clavicles (pectoralis & deltoids), Temples (temporalis)  Fluid Accumulation:  1 - Mild Extremities (BUE + 1 pitting, BLE trace edema)   Strength:  Not Performed    Estimated Daily Nutrient Needs:  Energy (kcal):  4129-7531 kcals based on 28-30 kcals/kg/CBW; Weight Used for Energy Requirements:  Current     Protein (g):  63-79 g protein based on 1.2-1.5 g/kg/CBW;  Weight Used for Protein Requirements:  Current        Fluid (ml/day):  1806-9839 ml; Method Used for Fluid Requirements:  1 ml/kcal      Nutrition Related Findings:  Met with patient at bedside; Pt reports poor appetite/po intake x a few weeks prior to previous admission on 9/15; reports that she typically eats 3 small meals per day, however there are some times where she doesnt feel like eating; no difficulty swallowing per pt, however difficulty chewing d/t ill fitting dentures, agreeable to Easy to [employer] consistency; pt disliked Ensure compact d/t it was \"too chocolate-y\", pt requesting strawberry- will trial Ensure Enlive; Readmit; patient was admitted to [facility] on 9/[facility]-9/23, noted pt was d/c'd on 9/23, once she got home she had a panic attack and returned back to the ED on the same day she was d/c'd; patient has had 3 hospital admissions just this month per chart review; patient is alert and oriented x 4; abdomen WDL, soft, nontender, BS active, BM 9/25; BUE +1 pitting edema, BLE trace edema; BUN is elevated; H/H is low; GFR = 43; patient is currently on NC O2; patient has lipitor, pulmicort, buspar, plavix, lovenoc, lopressor, protonic, glycolax, deltasone, daliresp and zoloft ordered at this time; pt po intake during previous admission was on average 1-25% and started to slightly improve prior to d/c; pt po intake x 2 days during this admission is 26-50% and % of meals;       Wounds:  Stage II, Multiple, Moisture Associate Skin Damage (noted per previous admission wound care note- MASD with pressure component on sacrum + 2 area of stage 2 PI)       Current Nutrition Therapies:    ADULT DIET; Easy to Chew  Adult Oral Nutrition Supplement; Standard High Calorie/High Protein Oral Supplement    Anthropometric Measures:  · Height: 5' 2\" (157.5 cm)  · Current Body Weight: 116 lb 3.2 oz (52.7 kg) (obtained 9/25; actual weight)   · Admission Body Weight: 115 lb 2 oz (52.2 kg) (obtained 9/24; actual weight)    · Usual Body Weight: 109 lb 4 oz (49.6 kg) (obtained on 6/1/21; actual weight)     · Ideal Body Weight: 110 lbs; % Ideal Body Weight 105.6 %   · BMI: 21.2   · BMI Categories: Underweight (BMI less than 22) age over 72       Nutrition Diagnosis:   · Inadequate oral intake related to inadequate protein-energy intake, impaired respiratory function, increase demand for energy/nutrients, biting/chewing (masticatory) difficulty, catabolic illness as evidenced by poor intake prior to admission, intake 26-50%, wounds, BMI, lab values, other (comment), mild loss of subcutaneous fat, poor dentition, localized or generalized fluid accumulation, severe muscle loss (pt is a readmit + 3 hopsital admission this month alone; end stage COPD; ill fitting dentures)    Nutrition Interventions:   Food and/or Nutrient Delivery:  Start Oral Nutrition Supplement, Modify Current Diet  Nutrition Education/Counseling:  No recommendation at this time   Coordination of Nutrition Care:  Continue to monitor while inpatient    Goals:  patient will consume 50% or greater of her meals on ADULT DIET;  Regular + accept and consume 50% or greater of Ensure Enlive with all meals + HS snack       Nutrition Monitoring and Evaluation:   Behavioral-Environmental Outcomes:  None Identified   Food/Nutrient Intake Outcomes:  Food and Nutrient Intake, Supplement Intake  Physical Signs/Symptoms Outcomes:  Biochemical Data, Chewing or Swallowing, Fluid Status or Edema, Hemodynamic Status, Meal Time Behavior, Nutrition Focused Physical Findings, Skin, Weight     Discharge Planning:    Continue Oral Nutrition Supplement, Continue current diet     Electronically signed by Desmond Membreno RD, LD on 9/25/21 at 5:11 PM EDT    Contact: [phone]

## 2021-09-25 NOTE — FLOWSHEET NOTE
09/25/21 0232   Vitals   Temp 96.6 °F (35.9 °C)   Temp Source Oral   Pulse 87   Heart Rate Source Monitor   Resp 20   /65   BP Location Right upper arm   BP Upper/Lower Upper   BP Method Automatic   Patient Position Semi fowlers   Level of Consciousness Alert (0)   MEWS Score 1   Patient Currently in Pain No   Cardiac Rhythm Sinus tachy   Oxygen Therapy   SpO2 98 %   Pulse Oximeter Device Mode Continuous   Pulse Oximeter Device Location Left;Finger   O2 Device Nasal cannula   Pt. In bed awake. Bipap removed and 02 placed back on. Pt stated she wanted the bipap off in was hurting her nose even with the padding. Vitals stable. Pt denies any further needs, call light within reach.

## 2021-09-25 NOTE — PROGRESS NOTES
Shift assessment complete, morning medications given, patient waiting to take buspar, patient resting with no complaints of pain, will continue to monitor.  Anthoney Severance, RN

## 2021-09-25 NOTE — PLAN OF CARE
Problem: Falls - Risk of:  Goal: Will remain free from falls  Description: Will remain free from falls  9/24/2021 2237 by Lacey Troy RN  Outcome: Ongoing  0/31/9130 8253 by Darian Fontenot RN  Outcome: Ongoing  Goal: Absence of physical injury  Description: Absence of physical injury  4/41/8331 5161 by Darian Fontenot RN  Outcome: Ongoing     Problem: Skin Integrity:  Goal: Will show no infection signs and symptoms  Description: Will show no infection signs and symptoms  9/24/2021 2237 by Lacey Troy RN  Outcome: Ongoing  5/48/6832 0128 by Darian Fontenot RN  Outcome: Ongoing  Goal: Absence of new skin breakdown  Description: Absence of new skin breakdown  2/84/0431 4756 by Darian Fontenot RN  Outcome: Ongoing

## 2021-09-25 NOTE — PLAN OF CARE
Nutrition Problem #1: Inadequate oral intake  Intervention: Food and/or Nutrient Delivery: Start Oral Nutrition Supplement, Modify Current Diet  Nutritional Goals: patient will consume 50% or greater of her meals on ADULT DIET;  Regular + accept and consume 50% or greater of Ensure Enlive with all meals + HS snack

## 2021-09-25 NOTE — FLOWSHEET NOTE
09/24/21 1946   Vitals   Temp 97.2 °F (36.2 °C)   Temp Source Oral   Pulse 102   Heart Rate Source Monitor   Resp 21   /67   BP Location Right upper arm   BP Upper/Lower Upper   BP Method Automatic   Patient Position Semi fowlers   Level of Consciousness Alert (0)   MEWS Score 3   Patient Currently in Pain No   Cardiac Rhythm Sinus tachy   Pain Assessment   Pain Assessment 0-10   Pain Level 0   Oxygen Therapy   SpO2 95 %   Pulse Oximeter Device Mode Continuous   Pulse Oximeter Device Location Finger;Left   Skin Assessment Clean, dry, & intact   O2 Flow Rate (L/min) 3 L/min   Shift assessment completed-see flow sheet. Patient in bed awake,alert and oriented x4. Vitals stable. 95% on 3L/min. Evening medications given per order. Patient denies any SOB or discomfort at this time. Pt. Repositioned in bed, bond checked and adjusted. Patient denies any needs at this time,call light within reach and bed alarm on.

## 2021-09-25 NOTE — PROGRESS NOTES
Physical /Occupational Therapy  Attempted evaluation. Patient declined to participate in activity assessment, citing that the time was too early, she was anxious. Plan to return later this morning to re-attempt.     Dutch Puckett, PT #[id number]  Cyndie Catalan, [address] [place] [address], OTR/L   [id number]

## 2021-09-25 NOTE — PROGRESS NOTES
IM Progress Note    Admit Date:  9/23/2021  2    Interval history:    Copd exacerbation   Pt was just dcd yesterday am to home with IV abx and new home bipap  Comes back with increasing sob, hypercarbic , acidotic    CTA neg for PE   Used bipap overnight     Subjective:    Ms. Cristal Gomez is back on her home oxygen 3 L  , reports she had panic attack after she went home, taken her anxiety med but did not improve, used home bipap but still with dyspnea and came back to ER    Used our bipap here and improved, now back to baseline   aggreable for SNF    I spoke to patient and her daughter at bedside, they are agreeable for SNF, they have  made a decision about [address] . She is still anxious    Objective:   /81   Pulse 106   Temp 98.1 °F (36.7 °C) (Oral)   Resp 20   Ht 5' 2\" (1.575 m)   Wt 116 lb 3.2 oz (52.7 kg)   SpO2 98%   BMI 21.25 kg/m²       Intake/Output Summary (Last 24 hours) at 9/25/2021 1147  Last data filed at 9/25/2021 0914  Gross per 24 hour   Intake 480 ml   Output 275 ml   Net 205 ml       Physical Exam:  General:  Elderly female up in bed,  Awake, alert and oriented. Appears to be not in any distress  + anxiety  Mucous Membranes:  Pink , anicteric  Neck: No JVD, no carotid bruit, no thyromegaly  Chest:  Improving justa air entry ,no  accessory muscle use  Occasional wheeze   Cardiovascular:  RRR S1S2 heard, no murmurs or gallops  Abdomen:  Soft, undistended, non tender, no organomegaly, BS present  Extremities: scattered ecchymoses, large bruise to right hand  No edema or cyanosis.  Distal pulses well felt  Neurological : grossly normal    Medications:   Scheduled Medications:    busPIRone  2.5 mg Oral Once    cefepime  2,000 mg IntraVENous Q12H    atorvastatin  20 mg Oral Nightly    budesonide  0.5 mg Nebulization BID    busPIRone  5 mg Oral TID    clopidogrel  75 mg Oral Daily    metoprolol tartrate  25 mg Oral BID    pantoprazole  20 mg Oral Daily    polyethylene glycol  17 g Oral BID  Roflumilast  250 mcg Oral Daily    tiotropium  2 puff Inhalation Daily    sodium chloride flush  5-40 mL IntraVENous 2 times per day    enoxaparin  40 mg SubCUTAneous Daily    ipratropium-albuterol  1 ampule Inhalation Q4H While awake    sertraline  50 mg Oral Daily    mupirocin   Nasal BID    vancomycin (VANCOCIN) intermittent dosing (placeholder)   Other RX Placeholder    predniSONE  40 mg Oral Daily     I   sodium chloride       fluticasone, melatonin, sodium chloride flush, sodium chloride, ondansetron **OR** ondansetron, polyethylene glycol, acetaminophen **OR** acetaminophen, ipratropium-albuterol, iopamidol    Lab Data:  Recent Labs     09/23/21  0529 09/23/21  1632 09/25/21  0506   WBC 10.3 18.4* 9.3   HGB 11.0* 13.1 9.4*   HCT 34.6* 42.4 29.7*   MCV 88.4 90.1 89.0    320 175     Recent Labs     09/23/21  0529 09/23/21  1632 09/25/21  0506    140 137   K 3.3* 4.7 3.6    108 107   CO2 25 23 22   BUN 14 14 23*   CREATININE 0.7 0.9 1.2     Recent Labs     09/23/21  2105 09/24/21  0305   TROPONINI 0.06* 0.07*       Coagulation:   Lab Results   Component Value Date    INR 0.99 09/16/2021    APTT 59.7 08/31/2021     Cardiac markers:   Lab Results   Component Value Date    CKTOTAL 96 11/03/2015    TROPONINI 0.07 09/24/2021         Lab Results   Component Value Date    ALT 9 (L) 09/23/2021    AST 15 09/23/2021    ALKPHOS 98 09/23/2021    BILITOT 0.3 09/23/2021       Lab Results   Component Value Date    INR 0.99 09/16/2021    INR 1.24 (H) 09/19/2019    INR 1.08 09/19/2019    PROTIME 11.2 09/16/2021    PROTIME 14.1 (H) 09/19/2019    PROTIME 12.3 09/19/2019           CTA     No acute pulmonary embolus. Small bilateral pleural effusions and interstitial opacities, suggestive of   pulmonary edema, superimposed on fibrosis and emphysema. Mild bibasilar atelectasis. Mild cardiomegaly.      Mild prominence of the central pulmonary arteries.  This may be associated   with pulmonary arterial hypertension. Assessment & Plan:    Acute on chronic hypoxic and hypercarbic respiratory failure  With end-stage COPD with acute exacerbation  4th admission for same this month alone.     -Pt was just dcd yesterday am to home with IV abx and new home bipap  Comes back with increasing sob, hypercarbic , acidotic  - likely had panic attack   Improved with bipap and steroids  - CTA chest neg for PE  Currently on 3 L of oxygen. resume home bipap   Treated with  IV Solu-Medrol and continue IV cefepime and vancomycin day 10/14 for HCAP ( from last week adm)       Healthcare associate pneumonia  Elevated procalcitonin      IV cefepime and vancomycin for presumable gram-negative and/are MRSA pneumonia. CTA chest last night with no pna         CAD/HTN- continued home BB,   Mildly elevated troponin likely  demand based ischemia  Resumed Plavix       GERD- on ppi     Chronic anxiety- on zoloft , buspar     DVT Prophylaxis:   lovenox       Very poor prognosis. Continue BiPAP, continue IV antibiotic, continue BuSpar for anxiety,.   Plan Will be to discharge to SNF with the [facility] in a.m. if stable and bed available     Marisel Hou MD, 9/25/2021 11:47 AM

## 2021-09-26 LAB
ANION GAP SERPL CALCULATED.3IONS-SCNC: 11 MMOL/L (ref 3–16)
BUN BLDV-MCNC: 23 MG/DL (ref 7–20)
CALCIUM SERPL-MCNC: 8.8 MG/DL (ref 8.3–10.6)
CHLORIDE BLD-SCNC: 110 MMOL/L (ref 99–110)
CO2: 22 MMOL/L (ref 21–32)
CREAT SERPL-MCNC: 1.2 MG/DL (ref 0.6–1.2)
GFR AFRICAN AMERICAN: 52
GFR NON-AFRICAN AMERICAN: 43
GLUCOSE BLD-MCNC: 83 MG/DL (ref 70–99)
MAGNESIUM: 1.7 MG/DL (ref 1.8–2.4)
PHOSPHORUS: 2.3 MG/DL (ref 2.5–4.9)
POTASSIUM SERPL-SCNC: 3.4 MMOL/L (ref 3.5–5.1)
SODIUM BLD-SCNC: 143 MMOL/L (ref 136–145)

## 2021-09-26 PROCEDURE — 6370000000 HC RX 637 (ALT 250 FOR IP): Performed by: INTERNAL MEDICINE

## 2021-09-26 PROCEDURE — 6360000002 HC RX W HCPCS: Performed by: HOSPITALIST

## 2021-09-26 PROCEDURE — 2060000000 HC ICU INTERMEDIATE R&B

## 2021-09-26 PROCEDURE — 84100 ASSAY OF PHOSPHORUS: CPT

## 2021-09-26 PROCEDURE — 80048 BASIC METABOLIC PNL TOTAL CA: CPT

## 2021-09-26 PROCEDURE — 99232 SBSQ HOSP IP/OBS MODERATE 35: CPT | Performed by: INTERNAL MEDICINE

## 2021-09-26 PROCEDURE — 94640 AIRWAY INHALATION TREATMENT: CPT

## 2021-09-26 PROCEDURE — 83735 ASSAY OF MAGNESIUM: CPT

## 2021-09-26 PROCEDURE — 99233 SBSQ HOSP IP/OBS HIGH 50: CPT | Performed by: INTERNAL MEDICINE

## 2021-09-26 PROCEDURE — 94761 N-INVAS EAR/PLS OXIMETRY MLT: CPT

## 2021-09-26 PROCEDURE — 6370000000 HC RX 637 (ALT 250 FOR IP): Performed by: HOSPITALIST

## 2021-09-26 PROCEDURE — 2700000000 HC OXYGEN THERAPY PER DAY

## 2021-09-26 PROCEDURE — 2580000003 HC RX 258: Performed by: HOSPITALIST

## 2021-09-26 PROCEDURE — 36415 COLL VENOUS BLD VENIPUNCTURE: CPT

## 2021-09-26 RX ORDER — POTASSIUM CHLORIDE 750 MG/1
10 TABLET, EXTENDED RELEASE ORAL
Status: DISCONTINUED | OUTPATIENT
Start: 2021-09-26 | End: 2021-09-26

## 2021-09-26 RX ADMIN — ENOXAPARIN SODIUM 40 MG: 40 INJECTION SUBCUTANEOUS at 10:32

## 2021-09-26 RX ADMIN — ONDANSETRON 4 MG: 4 TABLET, ORALLY DISINTEGRATING ORAL at 02:54

## 2021-09-26 RX ADMIN — POTASSIUM & SODIUM PHOSPHATES POWDER PACK 280-160-250 MG 250 MG: 280-160-250 PACK at 16:52

## 2021-09-26 RX ADMIN — IPRATROPIUM BROMIDE AND ALBUTEROL SULFATE 1 AMPULE: .5; 2.5 SOLUTION RESPIRATORY (INHALATION) at 11:57

## 2021-09-26 RX ADMIN — BUSPIRONE HYDROCHLORIDE 5 MG: 5 TABLET ORAL at 20:07

## 2021-09-26 RX ADMIN — ATORVASTATIN CALCIUM 20 MG: 10 TABLET, FILM COATED ORAL at 20:07

## 2021-09-26 RX ADMIN — SODIUM CHLORIDE, PRESERVATIVE FREE 10 ML: 5 INJECTION INTRAVENOUS at 10:32

## 2021-09-26 RX ADMIN — IPRATROPIUM BROMIDE AND ALBUTEROL SULFATE 1 AMPULE: .5; 2.5 SOLUTION RESPIRATORY (INHALATION) at 23:33

## 2021-09-26 RX ADMIN — CLOPIDOGREL BISULFATE 75 MG: 75 TABLET ORAL at 10:32

## 2021-09-26 RX ADMIN — IPRATROPIUM BROMIDE AND ALBUTEROL SULFATE 1 AMPULE: .5; 2.5 SOLUTION RESPIRATORY (INHALATION) at 19:08

## 2021-09-26 RX ADMIN — ROFLUMILAST 250 MCG: 500 TABLET ORAL at 10:30

## 2021-09-26 RX ADMIN — IPRATROPIUM BROMIDE AND ALBUTEROL SULFATE 1 AMPULE: .5; 2.5 SOLUTION RESPIRATORY (INHALATION) at 06:55

## 2021-09-26 RX ADMIN — SODIUM CHLORIDE, PRESERVATIVE FREE 10 ML: 5 INJECTION INTRAVENOUS at 20:08

## 2021-09-26 RX ADMIN — METOPROLOL TARTRATE 25 MG: 25 TABLET, FILM COATED ORAL at 10:31

## 2021-09-26 RX ADMIN — TIOTROPIUM BROMIDE INHALATION SPRAY 2 PUFF: 3.12 SPRAY, METERED RESPIRATORY (INHALATION) at 06:56

## 2021-09-26 RX ADMIN — BUSPIRONE HYDROCHLORIDE 5 MG: 5 TABLET ORAL at 10:31

## 2021-09-26 RX ADMIN — SERTRALINE HYDROCHLORIDE 50 MG: 50 TABLET ORAL at 10:32

## 2021-09-26 RX ADMIN — POTASSIUM CHLORIDE 10 MEQ: 750 TABLET, EXTENDED RELEASE ORAL at 10:32

## 2021-09-26 RX ADMIN — BUDESONIDE 500 MCG: 0.5 SUSPENSION RESPIRATORY (INHALATION) at 06:55

## 2021-09-26 RX ADMIN — BUDESONIDE 500 MCG: 0.5 SUSPENSION RESPIRATORY (INHALATION) at 19:08

## 2021-09-26 RX ADMIN — ACETAMINOPHEN 650 MG: 325 TABLET ORAL at 12:50

## 2021-09-26 RX ADMIN — MUPIROCIN: 20 OINTMENT TOPICAL at 10:39

## 2021-09-26 RX ADMIN — BUSPIRONE HYDROCHLORIDE 5 MG: 5 TABLET ORAL at 14:13

## 2021-09-26 RX ADMIN — MAGNESIUM GLUCONATE 500 MG ORAL TABLET 400 MG: 500 TABLET ORAL at 14:13

## 2021-09-26 RX ADMIN — ONDANSETRON 4 MG: 4 TABLET, ORALLY DISINTEGRATING ORAL at 16:52

## 2021-09-26 RX ADMIN — METOPROLOL TARTRATE 25 MG: 25 TABLET, FILM COATED ORAL at 20:06

## 2021-09-26 RX ADMIN — ACETAMINOPHEN 650 MG: 325 TABLET ORAL at 20:06

## 2021-09-26 RX ADMIN — PANTOPRAZOLE SODIUM 20 MG: 20 TABLET, DELAYED RELEASE ORAL at 10:31

## 2021-09-26 RX ADMIN — PREDNISONE 40 MG: 20 TABLET ORAL at 10:30

## 2021-09-26 RX ADMIN — IPRATROPIUM BROMIDE AND ALBUTEROL SULFATE 1 AMPULE: .5; 2.5 SOLUTION RESPIRATORY (INHALATION) at 15:56

## 2021-09-26 ASSESSMENT — PAIN SCALES - GENERAL
PAINLEVEL_OUTOF10: 0
PAINLEVEL_OUTOF10: 3
PAINLEVEL_OUTOF10: 0
PAINLEVEL_OUTOF10: 0
PAINLEVEL_OUTOF10: 4
PAINLEVEL_OUTOF10: 1

## 2021-09-26 ASSESSMENT — PAIN SCALES - WONG BAKER
WONGBAKER_NUMERICALRESPONSE: 0

## 2021-09-26 NOTE — PROGRESS NOTES
Pt asked nurse for nausea medication stating she is still feeling sick to her stomach. Pt is alert, VSS. Zofran disintegrating tablet given. All needs addressed. Call light within reach, bed in lowest position.

## 2021-09-26 NOTE — FLOWSHEET NOTE
09/26/21 1445   Vital Signs   Temp 98.4 °F (36.9 °C)   Temp Source Oral   Pulse 101   Heart Rate Source Monitor   Resp 18   /76   Patient Position High fowlers   Level of Consciousness Alert (0)   MEWS Score 2   Patient Currently in Pain Denies   Pain Assessment   Pain Assessment 0-10   Pain Level 0   Oxygen Therapy   SpO2 100 %       Patient sitting with daughter at bedside. Patient requested home bipap trial to get used to the settings. This RN suggested 2 hours on, 2 hours off. Patient tolerated machine for 10 minutes. This RN then suggested 10 minutes every hour to encourage tolerance. Daughter in agreement on plan to encourage compliance. No other needs at this time.  Paul Tee RN

## 2021-09-26 NOTE — PROGRESS NOTES
Shift report given to nurse Mariama Woodard at bedside. Patient care handed off in stable condition at this time.  Darvin Jaime RN

## 2021-09-26 NOTE — PROGRESS NOTES
Shift assessment complete, morning medications given, patient resting with no complaints of pain, will continue to monitor.  Kristin Murray RN

## 2021-09-26 NOTE — PROGRESS NOTES
Pulmonary Progress Note  CC: End-stage COPD    Subjective: refused her home astral  overnight       EXAM: /64   Pulse 105   Temp 97.7 °F (36.5 °C) (Oral)   Resp 18   Ht 5' 2\" (1.575 m)   Wt 117 lb 9.6 oz (53.3 kg)   SpO2 98%   BMI 21.51 kg/m²  on 3 L  Constitutional:  No acute distress. Eyes: PERRL. Conjunctivae anicteric. ENT: Normal nose. Normal tongue. Neck:  Trachea is midline. No thyroid tenderness. Respiratory: No accessory muscle usage. decreased breath sounds. No wheezes. No rales. No Rhonchi. Cardiovascular: Normal S1S2. No digit clubbing. No digit cyanosis. No LE edema. Psychiatric: No anxiety or Agitation. Alert and Oriented to person, place and time.     Scheduled Meds:   potassium chloride  10 mEq Oral Daily with breakfast    busPIRone  2.5 mg Oral Once    atorvastatin  20 mg Oral Nightly    budesonide  0.5 mg Nebulization BID    busPIRone  5 mg Oral TID    clopidogrel  75 mg Oral Daily    metoprolol tartrate  25 mg Oral BID    pantoprazole  20 mg Oral Daily    polyethylene glycol  17 g Oral BID    Roflumilast  250 mcg Oral Daily    tiotropium  2 puff Inhalation Daily    sodium chloride flush  5-40 mL IntraVENous 2 times per day    enoxaparin  40 mg SubCUTAneous Daily    ipratropium-albuterol  1 ampule Inhalation Q4H While awake    sertraline  50 mg Oral Daily    mupirocin   Nasal BID    predniSONE  40 mg Oral Daily     Continuous Infusions:   sodium chloride       PRN Meds:  fluticasone, melatonin, sodium chloride flush, sodium chloride, ondansetron **OR** ondansetron, polyethylene glycol, acetaminophen **OR** acetaminophen, ipratropium-albuterol, iopamidol    Labs:  CBC:   Recent Labs     09/23/21  1632 09/25/21  0506   WBC 18.4* 9.3   HGB 13.1 9.4*   HCT 42.4 29.7*   MCV 90.1 89.0    175     BMP:   Recent Labs     09/23/21  1632 09/25/21  0506 09/26/21  0417    137 143   K 4.7 3.6 3.4*    107 110   CO2 23 22 22   PHOS  --   --  2.3*   BUN 14 23* 23*   CREATININE 0.9 1.2 1.2       CTPA:   Lungs/pleura: There is advanced centrilobular emphysema. Omelia Sovereign may be a   superimposed interstitial opacities. There are small bilateral pleural   effusions, layer to the apices.  Dependent atelectasis is noted in both lower   lobes.          ASSESSMENT:  · Acute on chronic hypoxic respiratory failure  · Current life threatening hypercarbic respiratory failure. 3 or 4 re-admissions in the last month  · End stage COPD  · CHF    PLAN:  · Use home astral unit with avaps settings tonight  · Supplemental oxygen to maintain SaO2 >92%; wean as tolerated  · Intensive inhaled bronchodilator therapy. · Prednisone taper    · Completed 10/10 Vanc and Cefepime.    · The pt will need placement into a a vent capable nursing home for supervision and titration of nightly and PRN AVAPS to keep the pt out of the hospital or from sudden death at home

## 2021-09-26 NOTE — PLAN OF CARE
Problem: Falls - Risk of:  Goal: Will remain free from falls  Description: Will remain free from falls  Outcome: Ongoing  Goal: Absence of physical injury  Description: Absence of physical injury  Outcome: Ongoing     Problem: Skin Integrity:  Goal: Will show no infection signs and symptoms  Description: Will show no infection signs and symptoms  Outcome: Ongoing  Goal: Absence of new skin breakdown  Description: Absence of new skin breakdown  Outcome: Ongoing     Problem: Nutrition  Goal: Optimal nutrition therapy  9/25/2021 1710 by Brooke Pastrana RD, MILENA  Outcome: Ongoing  Goal: Understanding of nutritional guidelines  9/25/2021 1710 by Brooke Pastrana RD, LD  Outcome: Ongoing

## 2021-09-26 NOTE — PROGRESS NOTES
IM Progress Note    Admit Date:  9/23/2021  3    Interval history:      Copd exacerbation   Pt was just dcd in am to home with IV abx and new home bipap  Comes back within a few hours with increasing sob   -hypercarbic , acidotic    CTA neg for PE       Subjective:  9/25  Ms. Smith is back on her home oxygen 3 L  , reports she had panic attack after she went home, taken her anxiety med but did not improve, used home bipap but still with dyspnea and came back to ER    Used our bipap here and improved, now back to baseline   aggreable for SNF    I spoke to patient and her daughter at bedside, they are agreeable for SNF, they have  made a decision about [address] . She is still anxious      9/26  Patient did not use her home astral unit last night as she had episode of increased mucus production and as she was coughing up a lot of mucus, she is very nauseous. She is willing to try it today. anxiety seems to be better controlled today. Objective:   /64   Pulse 105   Temp 97.7 °F (36.5 °C) (Oral)   Resp 18   Ht 5' 2\" (1.575 m)   Wt 117 lb 9.6 oz (53.3 kg)   SpO2 98%   BMI 21.51 kg/m²       Intake/Output Summary (Last 24 hours) at 9/26/2021 1433  Last data filed at 9/26/2021 1331  Gross per 24 hour   Intake 522 ml   Output 1000 ml   Net -478 ml       Physical Exam:  General:  Elderly female up in bed, appears ill and fatigued  Awake, alert and oriented. Appears to be not in any distress  Mucous Membranes:  Pink , anicteric  Neck: No JVD, no carotid bruit, no thyromegaly  Chest:  Improving justa air entry ,no  accessory muscle use  Occasional wheeze   Cardiovascular:  RRR S1S2 heard, no murmurs or gallops  Abdomen:  Soft, undistended, non tender, no organomegaly, BS present  Extremities: scattered ecchymoses, large bruise to right hand  No edema or cyanosis.  Distal pulses well felt  Neurological : grossly normal    Medications:   Scheduled Medications:    potassium & sodium phosphates  1 packet Oral BID  magnesium oxide  400 mg Oral Daily    busPIRone  2.5 mg Oral Once    atorvastatin  20 mg Oral Nightly    budesonide  0.5 mg Nebulization BID    busPIRone  5 mg Oral TID    clopidogrel  75 mg Oral Daily    metoprolol tartrate  25 mg Oral BID    pantoprazole  20 mg Oral Daily    polyethylene glycol  17 g Oral BID    Roflumilast  250 mcg Oral Daily    tiotropium  2 puff Inhalation Daily    sodium chloride flush  5-40 mL IntraVENous 2 times per day    enoxaparin  40 mg SubCUTAneous Daily    ipratropium-albuterol  1 ampule Inhalation Q4H While awake    sertraline  50 mg Oral Daily    mupirocin   Nasal BID    predniSONE  40 mg Oral Daily     I   sodium chloride       fluticasone, melatonin, sodium chloride flush, sodium chloride, ondansetron **OR** ondansetron, polyethylene glycol, acetaminophen **OR** acetaminophen, ipratropium-albuterol, iopamidol    Lab Data:  Recent Labs     09/23/21  1632 09/25/21  0506   WBC 18.4* 9.3   HGB 13.1 9.4*   HCT 42.4 29.7*   MCV 90.1 89.0    175     Recent Labs     09/23/21  1632 09/25/21  0506 09/26/21  0417    137 143   K 4.7 3.6 3.4*    107 110   CO2 23 22 22   PHOS  --   --  2.3*   BUN 14 23* 23*   CREATININE 0.9 1.2 1.2     Recent Labs     09/23/21  2105 09/24/21  0305   TROPONINI 0.06* 0.07*       Coagulation:   Lab Results   Component Value Date    INR 0.99 09/16/2021    APTT 59.7 08/31/2021     Cardiac markers:   Lab Results   Component Value Date    CKTOTAL 96 11/03/2015    TROPONINI 0.07 09/24/2021         Lab Results   Component Value Date    ALT 9 (L) 09/23/2021    AST 15 09/23/2021    ALKPHOS 98 09/23/2021    BILITOT 0.3 09/23/2021       Lab Results   Component Value Date    INR 0.99 09/16/2021    INR 1.24 (H) 09/19/2019    INR 1.08 09/19/2019    PROTIME 11.2 09/16/2021    PROTIME 14.1 (H) 09/19/2019    PROTIME 12.3 09/19/2019       Radiology  CT CHEST PULMONARY EMBOLISM W CONTRAST   Final Result   No acute pulmonary embolus.       Small bilateral pleural effusions and interstitial opacities, suggestive of   pulmonary edema, superimposed on fibrosis and emphysema. Mild bibasilar atelectasis. Mild cardiomegaly. Mild prominence of the central pulmonary arteries. This may be associated   with pulmonary arterial hypertension. XR CHEST PORTABLE   Final Result   Bilateral lung opacities suspicious for atypical/viral pattern of pneumonia. Interstitial edema could appear similar               Assessment & Plan:    Acute on chronic hypoxic and hypercarbic respiratory failure  With end-stage COPD with acute exacerbation  - 4th admission for same this month alone.   -Pt was just dcd yesterday am to home with IV abx and new home bipap. Comes back with increasing sob, hypercarbic , acidotic  - likely had panic attack   - Improved with bipap and steroids    - CTA chest neg for PE  Currently on 3 L of oxygen, Resume home bipap. Patient encouraged to use her home astral unit  Treated with  IV Solu-Medrol -> DC prednisone now . -She was discharged on IV antibiotics last admission , resumed on it complete 10-day course . On  IV cefepime and vancomycin day  10/10 completed  for HCAP ( from last week adm)  Try home astral unit today     Healthcare associate pneumonia  Elevated procalcitonin   IV cefepime and vancomycin for presumable gram-negative and/are MRSA pneumonia. Completed 10-day course  CTA chest last night with no pna         CAD/HTN  - continued home BB   Mildly elevated troponin likely  demand based ischemia  Resumed Plavix       GERD  - on ppi     Chronic anxiety  - on zoloft , buspar     Generalized weakness  - PT, OT    Hypokalemia, hypomagnesemia, hypophosphatemia  - replete    DVT Prophylaxis:   lovenox       Very poor prognosis. She has advanced COPD  Continue BiPAP, inhaled bronchodilators . continue BuSpar for anxiety.       Plan Will be to discharge to [facility] in a.m. if stable and bed available     [facility] Valarie Hahn MD, 9/26/2021 2:33 PM

## 2021-09-26 NOTE — PROGRESS NOTES
Pt sitting up in bed this evening watching TV. A&O x 4, VSS, Shift assessment complete and all meds given per MAR. PRN Tylenol given for headache rated 5/10. PRN melatonin given to promote sleep. Oral meds given whole in applesauce. Pt repositioned. Pt encouraged to wear home BiPAP tonight. Bed in lowest position and call light within reach. Pt denies any further needs at this time.

## 2021-09-27 VITALS
OXYGEN SATURATION: 95 % | RESPIRATION RATE: 22 BRPM | SYSTOLIC BLOOD PRESSURE: 134 MMHG | TEMPERATURE: 97.4 F | WEIGHT: 107.8 LBS | DIASTOLIC BLOOD PRESSURE: 82 MMHG | HEART RATE: 91 BPM | BODY MASS INDEX: 19.84 KG/M2 | HEIGHT: 62 IN

## 2021-09-27 LAB
ANION GAP SERPL CALCULATED.3IONS-SCNC: 10 MMOL/L (ref 3–16)
BUN BLDV-MCNC: 24 MG/DL (ref 7–20)
CALCIUM SERPL-MCNC: 9 MG/DL (ref 8.3–10.6)
CHLORIDE BLD-SCNC: 111 MMOL/L (ref 99–110)
CO2: 24 MMOL/L (ref 21–32)
CREAT SERPL-MCNC: 1.3 MG/DL (ref 0.6–1.2)
GFR AFRICAN AMERICAN: 48
GFR NON-AFRICAN AMERICAN: 39
GLUCOSE BLD-MCNC: 88 MG/DL (ref 70–99)
POTASSIUM SERPL-SCNC: 4.1 MMOL/L (ref 3.5–5.1)
SARS-COV-2, NAAT: NOT DETECTED
SODIUM BLD-SCNC: 145 MMOL/L (ref 136–145)

## 2021-09-27 PROCEDURE — 80048 BASIC METABOLIC PNL TOTAL CA: CPT

## 2021-09-27 PROCEDURE — 99239 HOSP IP/OBS DSCHRG MGMT >30: CPT | Performed by: INTERNAL MEDICINE

## 2021-09-27 PROCEDURE — 6370000000 HC RX 637 (ALT 250 FOR IP): Performed by: INTERNAL MEDICINE

## 2021-09-27 PROCEDURE — 6370000000 HC RX 637 (ALT 250 FOR IP): Performed by: HOSPITALIST

## 2021-09-27 PROCEDURE — 6360000002 HC RX W HCPCS: Performed by: HOSPITALIST

## 2021-09-27 PROCEDURE — 87635 SARS-COV-2 COVID-19 AMP PRB: CPT

## 2021-09-27 PROCEDURE — 94640 AIRWAY INHALATION TREATMENT: CPT

## 2021-09-27 PROCEDURE — 99233 SBSQ HOSP IP/OBS HIGH 50: CPT | Performed by: INTERNAL MEDICINE

## 2021-09-27 PROCEDURE — 2580000003 HC RX 258: Performed by: HOSPITALIST

## 2021-09-27 PROCEDURE — 51702 INSERT TEMP BLADDER CATH: CPT

## 2021-09-27 PROCEDURE — 36415 COLL VENOUS BLD VENIPUNCTURE: CPT

## 2021-09-27 PROCEDURE — 94761 N-INVAS EAR/PLS OXIMETRY MLT: CPT

## 2021-09-27 RX ORDER — ONDANSETRON 4 MG/1
4 TABLET, ORALLY DISINTEGRATING ORAL EVERY 8 HOURS PRN
DISCHARGE
Start: 2021-09-27

## 2021-09-27 RX ORDER — FUROSEMIDE 20 MG/1
20 TABLET ORAL SEE ADMIN INSTRUCTIONS
DISCHARGE
Start: 2021-09-27

## 2021-09-27 RX ORDER — BUSPIRONE HYDROCHLORIDE 5 MG/1
5 TABLET ORAL 3 TIMES DAILY
DISCHARGE
Start: 2021-09-27 | End: 2021-09-28 | Stop reason: HOSPADM

## 2021-09-27 RX ORDER — PREDNISONE 10 MG/1
TABLET ORAL
Status: ON HOLD | DISCHARGE
Start: 2021-09-27 | End: 2021-10-14 | Stop reason: SDUPTHER

## 2021-09-27 RX ADMIN — PANTOPRAZOLE SODIUM 20 MG: 20 TABLET, DELAYED RELEASE ORAL at 08:39

## 2021-09-27 RX ADMIN — CLOPIDOGREL BISULFATE 75 MG: 75 TABLET ORAL at 08:40

## 2021-09-27 RX ADMIN — BUDESONIDE 500 MCG: 0.5 SUSPENSION RESPIRATORY (INHALATION) at 06:54

## 2021-09-27 RX ADMIN — POLYETHYLENE GLYCOL (3350) 17 G: 17 POWDER, FOR SOLUTION ORAL at 08:40

## 2021-09-27 RX ADMIN — BUSPIRONE HYDROCHLORIDE 5 MG: 5 TABLET ORAL at 13:34

## 2021-09-27 RX ADMIN — PREDNISONE 40 MG: 20 TABLET ORAL at 08:40

## 2021-09-27 RX ADMIN — METOPROLOL TARTRATE 25 MG: 25 TABLET, FILM COATED ORAL at 08:38

## 2021-09-27 RX ADMIN — IPRATROPIUM BROMIDE AND ALBUTEROL SULFATE 1 AMPULE: .5; 2.5 SOLUTION RESPIRATORY (INHALATION) at 11:00

## 2021-09-27 RX ADMIN — SODIUM CHLORIDE, PRESERVATIVE FREE 10 ML: 5 INJECTION INTRAVENOUS at 08:40

## 2021-09-27 RX ADMIN — IPRATROPIUM BROMIDE AND ALBUTEROL SULFATE 1 AMPULE: .5; 2.5 SOLUTION RESPIRATORY (INHALATION) at 06:50

## 2021-09-27 RX ADMIN — SERTRALINE HYDROCHLORIDE 50 MG: 50 TABLET ORAL at 08:40

## 2021-09-27 RX ADMIN — BUSPIRONE HYDROCHLORIDE 5 MG: 5 TABLET ORAL at 08:39

## 2021-09-27 RX ADMIN — TIOTROPIUM BROMIDE INHALATION SPRAY 2 PUFF: 3.12 SPRAY, METERED RESPIRATORY (INHALATION) at 06:55

## 2021-09-27 RX ADMIN — ROFLUMILAST 250 MCG: 500 TABLET ORAL at 08:39

## 2021-09-27 RX ADMIN — ENOXAPARIN SODIUM 40 MG: 40 INJECTION SUBCUTANEOUS at 08:38

## 2021-09-27 RX ADMIN — POTASSIUM & SODIUM PHOSPHATES POWDER PACK 280-160-250 MG 250 MG: 280-160-250 PACK at 08:40

## 2021-09-27 RX ADMIN — MAGNESIUM GLUCONATE 500 MG ORAL TABLET 400 MG: 500 TABLET ORAL at 08:38

## 2021-09-27 ASSESSMENT — PAIN SCALES - WONG BAKER
WONGBAKER_NUMERICALRESPONSE: 0

## 2021-09-27 NOTE — PROGRESS NOTES
Pt sitting up in bed this evening watching TV. A&O x 4, VSS, Shift assessment complete and all meds given per MAR. PRN Tylenol given for headache rated 4/10. Oral meds given whole in applesauce. Pt repositioned. Pt encouraged to wear home BiPAP tonight. Bed in lowest position and call light within reach. Pt denies any further needs. All needs addressed.

## 2021-09-27 NOTE — PROGRESS NOTES
RESPIRATORY THERAPY ASSESSMENT    Name:  Saeed Mendoza  Medical Record Number:  [id number]  Age: [de-identified] y.o. Gender: female  : 1940  Today's Date:  2021  Room:  /0303-01    Assessment     Is the patient being admitted for a COPD or Asthma exacerbation? No   (If yes the patient will be seen every 4 hours for the first 24 hours and then reassessed)    Patient Admission Diagnosis      Allergies  Allergies   Allergen Reactions    Latex      Burning      Codeine      \"hallucinations\"       Minimum Predicted Vital Capacity:               Actual Vital Capacity:                    Pulmonary History:copd/asthma  Home Oxygen Therapy:  2L  Home Respiratory Therapy:albuterol/duoneb/pulmicort/spiriva/symbicort   Current Respiratory Therapy:  pulmicort bid/duoneb q4wa  Treatment Type: N  Medications: Albuterol/Ipratropium    Respiratory Severity Index(RSI)   Patients with orders for inhalation medications, oxygen, or any therapeutic treatment modality will be placed on Respiratory Protocol. They will be assessed with the first treatment and at least every 72 hours thereafter. The following severity scale will be used to determine frequency of treatment intervention.     Smoking History: Pulmonary Disease or Smoking History, Greater than 15 pack year = 2    Social History  Social History     Tobacco Use    Smoking status: Former Smoker     Packs/day: 0.50     Years: 50.00     Pack years: 25.00     Types: Cigarettes     Quit date: 2019     Years since quittin.0    Smokeless tobacco: Never Used    Tobacco comment: advised to quit   Vaping Use    Vaping Use: Never assessed   Substance Use Topics    Alcohol use: No    Drug use: No       Recent Surgical History: None = 0  Past Surgical History  Past Surgical History:   Procedure Laterality Date    BRONCHOSCOPY  2019    Dr. Michelle gray/ALIYA    CARDIAC CATHETERIZATION  2019    Dr. Carlyn Kent assessed    Plan of Care: keep duoneb scheduled q4wa for wheezes     Electronically signed by Suzi Peterson RCP on 9/27/2021 at 12:53 AM    Respiratory Protocol Guidelines     1. Assessment and treatment by Respiratory Therapy will be initiated for medication and therapeutic interventions upon initiation of aerosolized medication. 2. Physician will be contacted for respiratory rate (RR) greater than 35 breaths per minute. Therapy will be held for heart rate (HR) greater than 140 beats per minute, pending direction from physician. 3. Bronchodilators will be administered via Metered Dose Inhaler (MDI) with spacer when the following criteria are met:  a. Alert and cooperative     b. HR < 140 bpm  c. RR < 30 bpm                d. Can demonstrate a 2-3 second inspiratory hold  4. Bronchodilators will be administered via Hand Held Nebulizer [facility]) to patients when ANY of the following criteria are met  a. Incognizant or uncooperative          b. Patients treated with HHN at Home        c. Unable to demonstrate proper use of MDI with spacer     d. RR > 30 bpm   5. Bronchodilators will be delivered via Metered Dose Inhaler (MDI), HHN, Aerogen to intubated patients on mechanical ventilation. 6. Inhalation medication orders will be delivered and/or substituted as outlined below. Aerosolized Medications Ordering and Administration Guidelines:    1. All Medications will be ordered by a physician, and their frequency and/or modality will be adjusted as defined by the patients Respiratory Severity Index (RSI) score. 2. If the patient does not have documented COPD, consider discontinuing anticholinergics when RSI is less than 9.  3. If the bronchospasm worsens (increased RSI), then the bronchodilator frequency can be increased to a maximum of every 4 hours. If greater than every 4 hours is required, the physician will be contacted.   4. If the bronchospasm improves, the frequency of the bronchodilator can be decreased, based

## 2021-09-27 NOTE — PROGRESS NOTES
Pulmonary Progress Note  CC: End-stage COPD    Subjective:   3 L O2-uses 2 L at home  No sputum        EXAM: /74   Pulse 84   Temp 98.5 °F (36.9 °C) (Oral)   Resp 24   Ht 5' 2\" (1.575 m)   Wt 107 lb 12.8 oz (48.9 kg)   SpO2 94%   BMI 19.72 kg/m²  on 3 L  /82   Pulse 91   Temp 97.4 °F (36.3 °C) (Oral)   Resp 22   Ht 5' 2\" (1.575 m)   Wt 107 lb 12.8 oz (48.9 kg)   SpO2 95%   BMI 19.72 kg/m²   Gen: + Distress. Ill-appearing  Eyes: PERRL. No sclera icterus. No conjunctival injection. ENT: No discharge. Pharynx clear. Neck: Trachea midline. No obvious mass. Resp: + Accessory muscle use. No crackles. Few wheezes. No rhonchi. No dullness on percussion. Good air entry. CV: Regular rate. Regular rhythm. No murmur or rub. No edema. GI: Non-tender. Non-distended. No hernia. Skin: Warm and dry. No nodule on exposed extremities. Lymph: No cervical LAD. No supraclavicular LAD. M/S: No cyanosis. No joint deformity. No clubbing. Neuro: Awake. Alert. Moves all four extremities. Psych: Oriented x 3. No anxiety.      Scheduled Meds:   potassium & sodium phosphates  1 packet Oral BID    magnesium oxide  400 mg Oral Daily    busPIRone  2.5 mg Oral Once    atorvastatin  20 mg Oral Nightly    budesonide  0.5 mg Nebulization BID    busPIRone  5 mg Oral TID    clopidogrel  75 mg Oral Daily    metoprolol tartrate  25 mg Oral BID    pantoprazole  20 mg Oral Daily    polyethylene glycol  17 g Oral BID    Roflumilast  250 mcg Oral Daily    tiotropium  2 puff Inhalation Daily    sodium chloride flush  5-40 mL IntraVENous 2 times per day    enoxaparin  40 mg SubCUTAneous Daily    ipratropium-albuterol  1 ampule Inhalation Q4H While awake    sertraline  50 mg Oral Daily    mupirocin   Nasal BID    predniSONE  40 mg Oral Daily     Continuous Infusions:   sodium chloride       PRN Meds:  fluticasone, melatonin, sodium chloride flush, sodium chloride, ondansetron **OR** ondansetron, polyethylene glycol, acetaminophen **OR** acetaminophen, ipratropium-albuterol, iopamidol    Labs:  CBC:   Recent Labs     09/25/21  0506   WBC 9.3   HGB 9.4*   HCT 29.7*   MCV 89.0        BMP:   Recent Labs     09/25/21  0506 09/26/21  0417 09/27/21  0507    143 145   K 3.6 3.4* 4.1    110 111*   CO2 22 22 24   PHOS  --  2.3*  --    BUN 23* 23* 24*   CREATININE 1.2 1.2 1.3*     Cultures  9/23 COVID-19 not detected    Imaging  CTPA 9/3 imaging reviewed by me and showed  No acute pulmonary embolus. Small bilateral pleural effusions and interstitial opacities, suggestive of   pulmonary edema, superimposed on fibrosis and emphysema. Mild bibasilar atelectasis. Mild cardiomegaly. Mild prominence of the central pulmonary arteries.  This may be associated   with pulmonary arterial hypertension        ASSESSMENT:  · Acute on chronic hypoxic respiratory failure  · Current life threatening hypercarbic respiratory failure. 3 or 4 re-admissions in the last month  · End stage COPD with acute exacerbation  · CHF    PLAN:  · Continue home astral unit with AVAPS settings   · Supplemental oxygen to maintain SaO2 >92%; wean as tolerated  · Inhaled bronchodilators  · Prednisone taper    · Completed 10/10 Vanc and Cefepime.    · PT OT  · The pt will need placement into a a vent capable nursing home for supervision and titration of nightly and PRN AVAPS to keep the pt out of the hospital or from sudden death at home

## 2021-09-27 NOTE — CARE COORDINATION
INTERDISCIPLINARY PLAN OF CARE CONFERENCE    Date/Time: 9/27/2021 10:38 AM  Completed by: Marley Meza RN, Case Management      Patient Name:  Mak Triplett  YOB: 1940  Admitting Diagnosis: Acute on chronic respiratory failure with hypoxemia [facility]) [J96.21]     Admit Date/Time:  9/23/2021  4:12 PM    Chart reviewed. Interdisciplinary team contacted or reviewed plan related to patient progress and discharge plans. Disciplines included Case Management, Nursing, and Dietitian. Current Status:ongoing  PT/OT recommendation for discharge plan of care: SNF    Expected D/C Disposition:  Skilled nursing facility  Confirmed plan with patient and/or family Yes confirmed with: (name) pt and Juan (Alex Ro)  Met with:pt and Sanket  Discharge Plan Comments: Reviewed chart. Pt is a readmit. Role of discharge planner explained and patient and Juan verbalized understanding. Pt is form home with son and daughter. Pt and Juan are aware that PT/OT recommend a SNF, and Dr. Ahsan Ortiz recommended a SNF with Respiratory. CM provided Juan and pt with a SNF list on Friday. Juan and pt chose (1) [address], and (2) [facility], today. CM called Emelia with [address] and left a  with referral via confidential VM. CM faxed referral.   Kel Georges to hear if can accept. Juan requests pt is given anti-anxiety meds at discharge to go in squad to nursing home. Juan inquired of the pt's CO2 levels form the labs at 0507am this AM (9/27). CM informed her that the CO2 per labs this AM at at 24 (with a range reference of 21-32). Juan verbalized understanding. Pt's Asteril (breathing assisted device) is here at hospital at bedside for pt, as it was delivered to the hospital at last admission. It is from [facility]. Pt is on home O2 at 2-2.5L through [facility]. Addendum 1050: CM left another VM for Emelia with [address] to inquire if can accept.      Addendum 1052: Hugh Dugan returned call back from [address] and states that it is being sent for review and that she will need a rapid covid. Rapid covid for SNF ordered and Almeta Ahumada, RN, informed. Addendum 1150: Emelia with [address] called and stated that [address] can accept pt. CM informed Dr. Maribell Silva and Almeta Ahumada, [address] O2 in place on admit: Yes  Pt informed of need to bring portable home O2 tank on day of discharge for nursing to connect prior to leaving:  Yes  Verbalized agreement/Understanding: Yes                                                                                                            DISCHARGE ORDER  Date/Time 2021 12:09 PM  Completed by: Lisa Del Valle RN, Case Management    Patient Name: Jerry Sardinia    : 1940      Admit order Date and Status:2021  Noted discharge order. (verify MD's last order for status of admission/Traditional Medicare 3 [place] Inpatient qualifying stay required for SNF)    Confirmed discharge plan with:              Patient:  Yes              When pt confirms DC plan does any support person need to be contacted by CM Yes if yes who___Juan (daughter)___                      Discharge to Facility: [address] [place] [address] phone number for staff giving report:   · Name: [facility])  · Address: [place] [address] [address] , [place], [place], [facility]  · UIENY:[phone]  · BGX:[phone] or [phone]       Pre-certification completed: not needed   Hospital Exemption Notification (HENS) completed: yes   Discharge orders and Continuity of Care faxed to facility:  [phone]      Transportation:               Medical Transport explained with choice list offered to pt/family. Choice:Yes(no preference)  Agency used: [address] up time:   1400      Pt/family/Nursing/Facility aware of  time: yes  Yes Names: pt, Matthias Galvez (daughter), Madeleine Escalona with [address]  Ambulance form completed:  yes:      Comments:Faxed LUPE. /AVS to [phone]. Rapid covid for SNF ordered and Almeta Ahumada, RN, informed. Pt has her Asteril to go with her to [facility]. Pt is being d/c'd to [facility] today. Pt's O2 sats are 95% on 3L. Discharge timeout done with Danielle Sheth RN. All discharge needs and concerns addressed. Addendum [phone]: CM received a call from Archibald Holter ([phone]), pt's family member, and she asked what to do with the cefepime and vanc that was delivered to the house from [address] [place] [address] and it was not used, as pt returned back to hospital and could not have the IV abx administered. CM gave Archibald Holter the number for [facility] and asked her to call them for what they suggest. Archibald Holter verbalized understanding. Discharging nurse to complete LUPE, reconcile AVS, and place final copy with patient's discharge packet. Discharging RN to ensure that written prescriptions for  Level II medications are sent with patient to the facility as per protocol.

## 2021-09-27 NOTE — PROGRESS NOTES
Shift assessment completed. See flow sheet. Medications given. Patient is A&O x4. Patient denies further needs at this time. Oxygen remains at 3L. Call light within reach. Will continue to monitor.

## 2021-09-27 NOTE — DISCHARGE SUMMARY
Name:  [facility]  Room:  /1453-22  MRN:    [id number]    Discharge Summary      This discharge summary is in conjunction with a complete physical exam done on the day of discharge. Discharging Physician: Arsenio Montanez MD       Admit: 9/23/2021  Discharge:  9/27/2021    HPI taken from admission H&P:      [de-identified] y.o. female with end stage COPD, discharged earlier today. She states that she was on bipap at home, has had some nausea, productive cough, but denies fever, chest pain. She is unable to recall events leading to readmission, is currently seen in the ICU on a NRB, awake, alert, oriented in no distress. Diagnoses this Admission and Hospital Course     Acute on chronic hypoxic & hypercarbic respiratory failure  With end-stage COPD with acute exacerbation  - 4th admission for same this month alone.   - Recently d/c'd to home w/ IV abx & new home bipap, returned w/ increasing sob, hypercarbia, acidotic  - likely had panic attack   - Improved with bipap and steroids   - CTA chest neg for PE  - Currently on 3 L of oxygen,  Resumed home bipap. Patient encouraged to use her home astral unit  - Treated with IV Solu-Medrol -> DC'd prednisone- slow taper  .  - She was discharged on IV antibiotics last admission, resumed on it, completed 10-day course of  IV cefepime and vancomycin or HCAP ( from last week adm)   tolerating home astral unit . Cont nightly and as needed, stable on supplemental oxygen 3 L.     Healthcare associate pneumonia  Elevated procalcitonin   - IV cefepime and vancomycin for presumable gram-negative and/are MRSA pneumonia  - Completed 10-day course  - CTA chest last night with no pna      CAD/HTN  - continued home BB   - Mildly elevated troponin likely  demand based ischemia  - Resumed Plavix       GERD  - on ppi      Chronic anxiety  - on zoloft , buspar      Generalized weakness  - PT, OT  - discharged to SNF     Hypokalemia, hypomagnesemia, hypophosphatemia  - repleted  - resolved    Procedures (Please Review Full Report for Details)  Modified Barium Swallow    Consults    Pulmonology    Physical Exam at Discharge:    /82   Pulse 91   Temp 97.4 °F (36.3 °C) (Oral)   Resp 22   Ht 5' 2\" (1.575 m)   Wt 107 lb 12.8 oz (48.9 kg)   SpO2 95%   BMI 19.72 kg/m²   General:  Elderly female up in bed, appears chronically ill and fatigued  Awake, alert and oriented. Appears to be not in any distress  Mucous Membranes:  Pink , anicteric  Neck: No JVD, no carotid bruit, no thyromegaly  Chest:  Improving justa air entry ,no  accessory muscle use  Occasional wheeze   Cardiovascular:  RRR S1S2 heard, no murmurs or gallops  Abdomen:  Soft, undistended, non tender, no organomegaly, BS present  Extremities: scattered ecchymoses, large bruise to right hand  No edema or cyanosis. Distal pulses well felt  Neurological : grossly normal    CBC:   Recent Labs     09/25/21  0506   WBC 9.3   HGB 9.4*   HCT 29.7*   MCV 89.0        BMP:   Recent Labs     09/25/21  0506 09/26/21  0417 09/27/21  0507    143 145   K 3.6 3.4* 4.1    110 111*   CO2 22 22 24   PHOS  --  2.3*  --    BUN 23* 23* 24*   CREATININE 1.2 1.2 1.3*     CULTURES    SARS-COV-2 - Rapid: Not detected     RADIOLOGY    CT CHEST PULMONARY EMBOLISM W CONTRAST   Final Result   No acute pulmonary embolus. Small bilateral pleural effusions and interstitial opacities, suggestive of   pulmonary edema, superimposed on fibrosis and emphysema. Mild bibasilar atelectasis. Mild cardiomegaly. Mild prominence of the central pulmonary arteries. This may be associated   with pulmonary arterial hypertension. XR CHEST PORTABLE   Final Result   Bilateral lung opacities suspicious for atypical/viral pattern of pneumonia.    Interstitial edema could appear similar             Discharge Medications     Medication List      START taking these medications    magnesium oxide 400 (241.3 Mg) MG Tabs tablet  Commonly known as: MAG-OX  Take 1 tablet by mouth daily  Start taking on: September 28, 2021     ondansetron 4 MG disintegrating tablet  Commonly known as: ZOFRAN-ODT  Take 1 tablet by mouth every 8 hours as needed for Nausea or Vomiting     potassium & sodium phosphates 280-160-250 MG Pack  Commonly known as: PHOS-NAK  Take 1 packet by mouth daily     predniSONE 10 MG tablet  Commonly known as: DELTASONE  Take 4 tabs a day for 5 days ,   then 3 tabs a day for 5 days,  Then 2 tabs a day for 5 days ,  Then 1 tab daily        CHANGE how you take these medications    busPIRone 5 MG tablet  Commonly known as: BUSPAR  Take 1 tablet by mouth 3 times daily  What changed:   · medication strength  · when to take this  · reasons to take this  · additional instructions     furosemide 20 MG tablet  Commonly known as: LASIX  Take 1 tablet by mouth See Admin Instructions Tuesday, friday  What changed:   · when to take this  · additional instructions        CONTINUE taking these medications    acetaminophen 500 MG tablet  Commonly known as: TYLENOL     albuterol sulfate  (90 Base) MCG/ACT inhaler  Commonly known as: Proventil HFA  Inhale 2 puffs into the lungs every 6 hours as needed for Wheezing or Shortness of Breath.      atorvastatin 20 MG tablet  Commonly known as: LIPITOR  Take 1 tablet by mouth nightly     budesonide 0.5 MG/2ML nebulizer suspension  Commonly known as: PULMICORT  Take 2 mLs by nebulization 2 times daily     clopidogrel 75 MG tablet  Commonly known as: PLAVIX  Take 1 tablet by mouth daily     Daliresp 250 MCG tablet  Generic drug: Roflumilast     docusate 100 MG Caps  Commonly known as: COLACE, DULCOLAX  Take 100 mg by mouth 2 times daily     ferrous sulfate 325 (65 Fe) MG tablet  Commonly known as: IRON 325     fluticasone 50 MCG/ACT nasal spray  Commonly known as: FLONASE  1 spray by Each Nostril route daily as needed for Rhinitis     ipratropium-albuterol 0.5-2.5 (3) MG/3ML Soln nebulizer solution  Commonly known as: DUONEB  Inhale 3 mLs into the lungs every 6 hours     melatonin 5 MG Tbdp disintegrating tablet  Take 1 tablet by mouth nightly as needed (sleep)     menthol-zinc oxide 0.44-20.6 % Oint ointment  Commonly known as: CALMOSEPTINE  Apply topically 2 times daily as needed (Apply to buttocks until resolved) Max 30 ml per day. metoprolol tartrate 25 MG tablet  Commonly known as: LOPRESSOR  Take 1 tablet by mouth 2 times daily     pantoprazole 20 MG tablet  Commonly known as: PROTONIX     polyethylene glycol 17 g packet  Commonly known as: GLYCOLAX  Take 17 g by mouth 2 times daily     sertraline 50 MG tablet  Commonly known as: ZOLOFT     Spiriva HandiHaler 18 MCG inhalation capsule  Generic drug: tiotropium     Symbicort 160-4.5 MCG/ACT Aero  Generic drug: budesonide-formoterol        STOP taking these medications    ceFEPIme 2 g injection  Commonly known as: MAXIPIME     potassium chloride 20 MEQ packet  Commonly known as: KLOR-CON     vancomycin 1000 MG injection  Commonly known as: VANCOCIN           Where to Get Your Medications      Information about where to get these medications is not yet available    Ask your nurse or doctor about these medications  · busPIRone 5 MG tablet  · furosemide 20 MG tablet  · magnesium oxide 400 (241.3 Mg) MG Tabs tablet  · ondansetron 4 MG disintegrating tablet  · potassium & sodium phosphates 280-160-250 MG Pack  · predniSONE 10 MG tablet           Discharged in stable condition to SNF. Total time 35 minutes. > 50%  dominated by counseling and coordination of care. Follow Up: Follow up with physician at SNF.        Palomo Andrew MD  9/27/2021

## 2021-09-27 NOTE — DISCHARGE INSTR - COC
Continuity of Care Form    Patient Name: Abbi Aggarwal   :    MRN:  [id number]    Admit date:  2021  Discharge date:  2021    Code Status Order: Full Code   Advance Directives:      Admitting Physician:  Arnold Strickland MD  PCP: Esther Morejon MD    Discharging Nurse: [facility]/Room#: /4445-94  Discharging Unit Phone Number: [phone]    Emergency Contact:   Extended Emergency Contact Information  Primary Emergency Contact: [address] of [address] [place] [address] Phone: [phone]  Relation: Child  Secondary Emergency Contact: [address] [facility] Phone: [phone]  Relation: Other    Past Surgical History:  Past Surgical History:   Procedure Laterality Date    BRONCHOSCOPY  2019    Dr. Gladys Valiente - w/BAL   [address]  2019    Dr. Humberto Lugo N/A 2020    COLONOSCOPY DIAGNOSTIC performed by Willie Lawson DO at [address] [facility] N/A 2019    OFF PUMP CORONARY ARTERY BYPASS GRAFTING X2, INTERNAL MAMMARY ARTERY, SAPHENOUS VEIN GRAFT performed by Nilda Georges MD at [facility] CATH  2021    [facility] CATH 2021 [address], PARTIAL Left     SIGMOIDOSCOPY  2020    4 bands    SIGMOIDOSCOPY N/A 2020    FLEX SIG W/ BANDING SIGMOIDOSCOPY DIAGNOSTIC FLEXIBLE performed by Willie Lawson DO at [facility]       Immunization History:   Immunization History   Administered Date(s) Administered    COVID-19, Pfizer, PF, 30mcg/0.3mL 2021, 2021    Influenza Virus Vaccine 10/17/2013    Influenza, Rudean Heman, 6 mo and older, IM, PF (Flulaval, Fluarix) 2019    Influenza, Quadv, IM, PF (6 mo and older Fluzone, Flulaval, Fluarix, and 3 yrs and older Afluria) 10/04/2019    Pneumococcal Conjugate 13-valent ([id number]) 2017    Pneumococcal Polysaccharide ([id number]) 02/19/2015    Tdap (Boostrix, Adacel) 07/17/2018       Active Problems:  Patient Active Problem List   Diagnosis Code    Hyperlipidemia E78.5    Asthma J45.909    OA (osteoarthritis) M19.90    Tobacco use Z72.0    COPD exacerbation ([facility] J44.1    [facility]eps[facility] A41.9    Abnormal chest x-ray R93.89    COPD with acute exacerbation ([facility]) J44.1    Shortness of breath R06.02    Tobacco abuse Z72.0    Moderate malnutrition ([facility]) E44.0    NSTEMI (non-ST elevated myocardial infarction) ([facility]) I21.4    Elevated troponin R77.8    Acute respiratory failure with hypoxia ([facility]) J96.01    CAD (coronary artery disease) I25.10    Acute pulmonary edema ([facility]) J81.0    Pulmonary infiltrate R91.8    Elevated brain natriuretic peptide (BNP) level R79.89    Leukocytosis D72.829    Ischemic cardiomyopathy I25.5    Acute combined systolic and diastolic congestive heart failure ([facility]) I50.41    Hypernatremia E87.0    NADJA (acute kidney injury) ([facility] N17.9    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    Mucus plugging of bronchi T17.500A    CAD in native artery I25.10    S/P CABG (coronary artery bypass graft) Z95.1    Pneumonia of both lower lobes due to methicillin resistant Staphylococcus aureus (MRSA) ([facility]) J15.212    GI bleed K92.2    Severe malnutrition ([facility]) E43    Chest pain R07.9    Chronic respiratory failure with hypoxia ([facility]) J96.11    Essential hypertension I10    Anemia D64.9    Acute respiratory failure ([facility]) J96.00    Acute respiratory distress R06.03    Volume overload E87.70    Demand ischemia ([facility]) I24.8    GERD (gastroesophageal reflux disease) K21.9    Acute respiratory failure with hypoxia and hypercapnia ([facility]) J96.01, J96.02    Shock ([facility]) R57.9    Pneumonia due to infectious organism J18.9    Acute on chronic respiratory failure with hypoxia and hypercapnia ([facility]) J96.21, J96.22    Chronic obstructive pulmonary disease ([facility]) J44.9    Acute on chronic respiratory failure with hypoxemia (HCC) J96.21    Chronic anxiety F41.9       Isolation/Infection:   Isolation            No Isolation          Patient Infection Status       Infection Onset Added Last Indicated Last Indicated By Review Planned Expiration Resolved Resolved By    None active    Resolved    COVID-19 Rule Out 09/23/21 09/23/21 09/23/21 COVID-19, Rapid (Ordered)   09/23/21 Rule-Out Test Resulted    COVID-19 Rule Out 09/16/21 09/16/21 09/16/21 COVID-19 & Influenza Combo (Ordered)   09/16/21 Rule-Out Test Resulted    COVID-19 Rule Out 09/09/21 09/09/21 09/09/21 COVID-19 (Ordered)   09/10/21 Ignacia Gallardo RN    COVID-19 Rule Out 09/09/21 09/09/21 09/09/21 COVID-19, Rapid (Ordered)   09/09/21 Rule-Out Test Resulted    COVID-19 Rule Out 09/09/21 09/09/21 09/09/21 SARS-CoV-2 NAAT (Rapid) (Ordered)   09/09/21 Rule-Out Test Resulted    COVID-19 Rule Out 08/30/21 08/30/21 08/30/21 COVID-19 (Ordered)   08/30/21 Rule-Out Test Resulted    COVID-19 Rule Out 08/29/21 08/29/21 08/29/21 COVID-19, Rapid (Ordered)   08/29/21 Rule-Out Test Resulted    COVID-19 Rule Out 06/01/21 06/01/21 06/01/21 COVID-19 & Influenza Combo (Ordered)   06/01/21 Rule-Out Test Resulted    MRSA 09/22/19 09/25/19 09/22/19 Respiratory Culture   06/01/21 Theo Dotson RN            Nurse Assessment:  Last Vital Signs: /82   Pulse 108   Temp 97.4 °F (36.3 °C) (Oral)   Resp 20   Ht 5' 2\" (1.575 m)   Wt 107 lb 12.8 oz (48.9 kg)   SpO2 98%   BMI 19.72 kg/m²     Last documented pain score (0-10 scale): Pain Level: 4  Last Weight:   Wt Readings from Last 1 Encounters:   09/27/21 107 lb 12.8 oz (48.9 kg)     Mental Status:  oriented    IV Access:  - None    Nursing Mobility/ADLs:  Walking   Dependent  Transfer  Dependent  Bathing  Dependent  Dressing  Dependent  Toileting  Dependent  Feeding  Assisted  Med Admin  Assisted  Med Delivery   whole    Wound Care Documentation and Therapy:        Elimination:  Continence:   · Bowel: No  · Bladder: No  Urinary Catheter: None   Colostomy/Ileostomy/Ileal Conduit: No       Date of Last BM: 09/27/21    Intake/Output Summary (Last 24 hours) at 9/27/2021 1057  Last data filed at 9/27/2021 0908  Gross per 24 hour   Intake 480 ml   Output 450 ml   Net 30 ml     I/O last 3 completed shifts: In: 582 [P. O.:582]  Out: 1050 [Urine:1050]    Safety Concerns: At Risk for Falls    Impairments/Disabilities:      None    Nutrition Therapy:  Current Nutrition Therapy:   - Oral Diet:  General    Routes of Feeding: Oral  Liquids: Thin Liquids  Daily Fluid Restriction: no  Last Modified Barium Swallow with Video (Video Swallowing Test): not done    Treatments at the Time of Hospital Discharge:   Respiratory Treatments:   Oxygen Therapy:  is on oxygen at 3 L/min per nasal cannula. Ventilator:    - No ventilator support    Rehab Therapies: Physical Therapy, Occupational Therapy and SN  Weight Bearing Status/Restrictions: No weight bearing restirctions  Other Medical Equipment (for information only, NOT a DME order):  hospital bed  Other Treatments:     Patient's personal belongings (please select all that are sent with patient): All personal belongings sent with patient    RN SIGNATURE:  Electronically signed by Mendel Boroughs, RN on 9/27/21 at 1:22 PM EDT    CASE MANAGEMENT/SOCIAL WORK SECTION    Inpatient Status Date: 9/23/2021    Readmission Risk Assessment Score:  Readmission Risk              Risk of Unplanned Readmission:  43           Discharging to Facility/ Agency     Name: [facility])  Address: [address] , [place], [place] [address]  TXU:[phone] or [phone]      :    / signature: Electronically signed by Ray Ochoa RN on 9/27/21 at 12:12 PM EDT    PHYSICIAN SECTION    Prognosis: Fair    Condition at Discharge: Stable    Rehab Potential (if transferring to Rehab):  Fair    Recommended Labs or Other Treatments After Discharge:    Cont home bipap nightly , and prn    O2 3L    Physician Certification: I certify the above information and transfer of [facility]   is necessary for the continuing treatment of the diagnosis listed and that she requires [facility] for less 30 days.      Update Admission H&P: No change in H&P    PHYSICIAN SIGNATURE:  Electronically signed by Arsenio Montanez MD on 9/27/21 at 10:58 AM EDT

## 2021-09-28 ENCOUNTER — HOSPITAL ENCOUNTER (INPATIENT)
Age: 81
LOS: 1 days | Discharge: NURSING, REHAB OR LONG-TERM CARE | DRG: 189 | End: 2021-09-28
Attending: STUDENT IN AN ORGANIZED HEALTH CARE EDUCATION/TRAINING PROGRAM
Payer: MEDICARE

## 2021-09-28 ENCOUNTER — TELEPHONE (OUTPATIENT)
Dept: OTHER | Facility: CLINIC | Age: 81
End: 2021-09-28

## 2021-09-28 ENCOUNTER — APPOINTMENT (OUTPATIENT)
Dept: GENERAL RADIOLOGY | Age: 81
DRG: 189 | End: 2021-09-28
Payer: MEDICARE

## 2021-09-28 VITALS
OXYGEN SATURATION: 100 % | TEMPERATURE: 97.6 F | BODY MASS INDEX: 19.69 KG/M2 | HEART RATE: 67 BPM | DIASTOLIC BLOOD PRESSURE: 72 MMHG | WEIGHT: 107 LBS | SYSTOLIC BLOOD PRESSURE: 119 MMHG | HEIGHT: 62 IN

## 2021-09-28 DIAGNOSIS — F41.9 ANXIETY: ICD-10-CM

## 2021-09-28 DIAGNOSIS — R77.8 ELEVATED TROPONIN: ICD-10-CM

## 2021-09-28 DIAGNOSIS — J96.02 ACUTE RESPIRATORY FAILURE WITH HYPOXIA AND HYPERCAPNIA (HCC): Primary | ICD-10-CM

## 2021-09-28 DIAGNOSIS — I50.9 CONGESTIVE HEART FAILURE, UNSPECIFIED HF CHRONICITY, UNSPECIFIED HEART FAILURE TYPE (HCC): ICD-10-CM

## 2021-09-28 DIAGNOSIS — J96.01 ACUTE RESPIRATORY FAILURE WITH HYPOXIA AND HYPERCAPNIA (HCC): Primary | ICD-10-CM

## 2021-09-28 DIAGNOSIS — J18.9 HEALTHCARE-ASSOCIATED PNEUMONIA: ICD-10-CM

## 2021-09-28 DIAGNOSIS — R79.89 ELEVATED SERUM CREATININE: ICD-10-CM

## 2021-09-28 LAB
A/G RATIO: 1.2 (ref 1.1–2.2)
ALBUMIN SERPL-MCNC: 4.1 G/DL (ref 3.4–5)
ALP BLD-CCNC: 104 U/L (ref 40–129)
ALT SERPL-CCNC: 18 U/L (ref 10–40)
ANION GAP SERPL CALCULATED.3IONS-SCNC: 9 MMOL/L (ref 3–16)
APTT: 30.4 SEC (ref 26.2–38.6)
AST SERPL-CCNC: 23 U/L (ref 15–37)
BASE EXCESS VENOUS: -3.3 MMOL/L (ref -3–3)
BASE EXCESS VENOUS: -3.8 MMOL/L (ref -3–3)
BASOPHILS ABSOLUTE: 0 K/UL (ref 0–0.2)
BASOPHILS RELATIVE PERCENT: 0.2 %
BILIRUB SERPL-MCNC: 0.4 MG/DL (ref 0–1)
BUN BLDV-MCNC: 27 MG/DL (ref 7–20)
CALCIUM SERPL-MCNC: 9.5 MG/DL (ref 8.3–10.6)
CARBOXYHEMOGLOBIN: 1.5 % (ref 0–1.5)
CARBOXYHEMOGLOBIN: 1.6 % (ref 0–1.5)
CHLORIDE BLD-SCNC: 106 MMOL/L (ref 99–110)
CO2: 28 MMOL/L (ref 21–32)
CREAT SERPL-MCNC: 1.5 MG/DL (ref 0.6–1.2)
EKG ATRIAL RATE: 84 BPM
EKG DIAGNOSIS: NORMAL
EKG P AXIS: 73 DEGREES
EKG P-R INTERVAL: 128 MS
EKG Q-T INTERVAL: 358 MS
EKG QRS DURATION: 82 MS
EKG QTC CALCULATION (BAZETT): 423 MS
EKG R AXIS: 2 DEGREES
EKG T AXIS: 57 DEGREES
EKG VENTRICULAR RATE: 84 BPM
EOSINOPHILS ABSOLUTE: 0 K/UL (ref 0–0.6)
EOSINOPHILS RELATIVE PERCENT: 0.1 %
GFR AFRICAN AMERICAN: 40
GFR NON-AFRICAN AMERICAN: 33
GLOBULIN: 3.3 G/DL
GLUCOSE BLD-MCNC: 147 MG/DL (ref 70–99)
HCO3 VENOUS: 27.5 MMOL/L (ref 23–29)
HCO3 VENOUS: 29.4 MMOL/L (ref 23–29)
HCT VFR BLD CALC: 43.1 % (ref 36–48)
HEMOGLOBIN: 12.9 G/DL (ref 12–16)
INFLUENZA A: NOT DETECTED
INFLUENZA B: NOT DETECTED
INR BLD: 0.85 (ref 0.88–1.12)
LACTIC ACID, SEPSIS: 0.9 MMOL/L (ref 0.4–1.9)
LACTIC ACID, SEPSIS: 0.9 MMOL/L (ref 0.4–1.9)
LYMPHOCYTES ABSOLUTE: 0.5 K/UL (ref 1–5.1)
LYMPHOCYTES RELATIVE PERCENT: 3.1 %
MCH RBC QN AUTO: 27.5 PG (ref 26–34)
MCHC RBC AUTO-ENTMCNC: 29.8 G/DL (ref 31–36)
MCV RBC AUTO: 92 FL (ref 80–100)
METHEMOGLOBIN VENOUS: 0.3 %
METHEMOGLOBIN VENOUS: 0.3 %
MONOCYTES ABSOLUTE: 0.8 K/UL (ref 0–1.3)
MONOCYTES RELATIVE PERCENT: 5 %
NEUTROPHILS ABSOLUTE: 14 K/UL (ref 1.7–7.7)
NEUTROPHILS RELATIVE PERCENT: 91.6 %
O2 CONTENT, VEN: 11 VOL %
O2 CONTENT, VEN: 12 VOL %
O2 SAT, VEN: 42 %
O2 SAT, VEN: 59 %
O2 THERAPY: ABNORMAL
O2 THERAPY: ABNORMAL
PCO2, VEN: 104.2 MMHG (ref 40–50)
PCO2, VEN: 84.7 MMHG (ref 40–50)
PDW BLD-RTO: 21.2 % (ref 12.4–15.4)
PH VENOUS: 7.07 (ref 7.35–7.45)
PH VENOUS: 7.13 (ref 7.35–7.45)
PLATELET # BLD: 253 K/UL (ref 135–450)
PMV BLD AUTO: 8.2 FL (ref 5–10.5)
PO2, VEN: 34 MMHG (ref 25–40)
PO2, VEN: 41.2 MMHG (ref 25–40)
POTASSIUM REFLEX MAGNESIUM: 4.9 MMOL/L (ref 3.5–5.1)
PRO-BNP: ABNORMAL PG/ML (ref 0–449)
PROCALCITONIN: 1 NG/ML (ref 0–0.15)
PROTHROMBIN TIME: 9.5 SEC (ref 9.9–12.7)
RBC # BLD: 4.68 M/UL (ref 4–5.2)
SARS-COV-2 RNA, RT PCR: NOT DETECTED
SODIUM BLD-SCNC: 143 MMOL/L (ref 136–145)
TCO2 CALC VENOUS: 30 MMOL/L
TCO2 CALC VENOUS: 33 MMOL/L
TOTAL PROTEIN: 7.4 G/DL (ref 6.4–8.2)
TROPONIN: 0.1 NG/ML
WBC # BLD: 15.3 K/UL (ref 4–11)

## 2021-09-28 PROCEDURE — 87636 SARSCOV2 & INF A&B AMP PRB: CPT

## 2021-09-28 PROCEDURE — 6360000002 HC RX W HCPCS: Performed by: INTERNAL MEDICINE

## 2021-09-28 PROCEDURE — 6360000002 HC RX W HCPCS: Performed by: PHYSICIAN ASSISTANT

## 2021-09-28 PROCEDURE — 80053 COMPREHEN METABOLIC PANEL: CPT

## 2021-09-28 PROCEDURE — 93010 ELECTROCARDIOGRAM REPORT: CPT | Performed by: INTERNAL MEDICINE

## 2021-09-28 PROCEDURE — 2580000003 HC RX 258: Performed by: STUDENT IN AN ORGANIZED HEALTH CARE EDUCATION/TRAINING PROGRAM

## 2021-09-28 PROCEDURE — 99223 1ST HOSP IP/OBS HIGH 75: CPT | Performed by: INTERNAL MEDICINE

## 2021-09-28 PROCEDURE — 6370000000 HC RX 637 (ALT 250 FOR IP): Performed by: PHYSICIAN ASSISTANT

## 2021-09-28 PROCEDURE — 96374 THER/PROPH/DIAG INJ IV PUSH: CPT

## 2021-09-28 PROCEDURE — 71045 X-RAY EXAM CHEST 1 VIEW: CPT

## 2021-09-28 PROCEDURE — 94660 CPAP INITIATION&MGMT: CPT

## 2021-09-28 PROCEDURE — 99284 EMERGENCY DEPT VISIT MOD MDM: CPT | Performed by: INTERNAL MEDICINE

## 2021-09-28 PROCEDURE — 36415 COLL VENOUS BLD VENIPUNCTURE: CPT

## 2021-09-28 PROCEDURE — 99283 EMERGENCY DEPT VISIT LOW MDM: CPT

## 2021-09-28 PROCEDURE — 83880 ASSAY OF NATRIURETIC PEPTIDE: CPT

## 2021-09-28 PROCEDURE — 2580000003 HC RX 258: Performed by: INTERNAL MEDICINE

## 2021-09-28 PROCEDURE — 85025 COMPLETE CBC W/AUTO DIFF WBC: CPT

## 2021-09-28 PROCEDURE — 2700000000 HC OXYGEN THERAPY PER DAY

## 2021-09-28 PROCEDURE — 85610 PROTHROMBIN TIME: CPT

## 2021-09-28 PROCEDURE — 6370000000 HC RX 637 (ALT 250 FOR IP): Performed by: STUDENT IN AN ORGANIZED HEALTH CARE EDUCATION/TRAINING PROGRAM

## 2021-09-28 PROCEDURE — 84145 PROCALCITONIN (PCT): CPT

## 2021-09-28 PROCEDURE — 6360000002 HC RX W HCPCS: Performed by: STUDENT IN AN ORGANIZED HEALTH CARE EDUCATION/TRAINING PROGRAM

## 2021-09-28 PROCEDURE — 94761 N-INVAS EAR/PLS OXIMETRY MLT: CPT

## 2021-09-28 PROCEDURE — 93005 ELECTROCARDIOGRAM TRACING: CPT | Performed by: STUDENT IN AN ORGANIZED HEALTH CARE EDUCATION/TRAINING PROGRAM

## 2021-09-28 PROCEDURE — 83605 ASSAY OF LACTIC ACID: CPT

## 2021-09-28 PROCEDURE — 85730 THROMBOPLASTIN TIME PARTIAL: CPT

## 2021-09-28 PROCEDURE — 96375 TX/PRO/DX INJ NEW DRUG ADDON: CPT

## 2021-09-28 PROCEDURE — 84484 ASSAY OF TROPONIN QUANT: CPT

## 2021-09-28 PROCEDURE — 82803 BLOOD GASES ANY COMBINATION: CPT

## 2021-09-28 PROCEDURE — 87040 BLOOD CULTURE FOR BACTERIA: CPT

## 2021-09-28 PROCEDURE — 1200000000 HC SEMI PRIVATE

## 2021-09-28 PROCEDURE — 6370000000 HC RX 637 (ALT 250 FOR IP): Performed by: INTERNAL MEDICINE

## 2021-09-28 RX ORDER — FUROSEMIDE 10 MG/ML
20 INJECTION INTRAMUSCULAR; INTRAVENOUS ONCE
Status: COMPLETED | OUTPATIENT
Start: 2021-09-28 | End: 2021-09-28

## 2021-09-28 RX ORDER — METHYLPREDNISOLONE SODIUM SUCCINATE 40 MG/ML
40 INJECTION, POWDER, LYOPHILIZED, FOR SOLUTION INTRAMUSCULAR; INTRAVENOUS EVERY 12 HOURS
Status: DISCONTINUED | OUTPATIENT
Start: 2021-09-28 | End: 2021-09-28 | Stop reason: HOSPADM

## 2021-09-28 RX ORDER — LORAZEPAM 0.5 MG/1
0.5 TABLET ORAL EVERY 4 HOURS PRN
Qty: 10 TABLET | Refills: 0 | Status: SHIPPED | OUTPATIENT
Start: 2021-09-28 | End: 2021-10-01

## 2021-09-28 RX ORDER — ONDANSETRON 4 MG/1
4 TABLET, ORALLY DISINTEGRATING ORAL EVERY 8 HOURS PRN
Status: DISCONTINUED | OUTPATIENT
Start: 2021-09-28 | End: 2021-09-28 | Stop reason: HOSPADM

## 2021-09-28 RX ORDER — FUROSEMIDE 10 MG/ML
40 INJECTION INTRAMUSCULAR; INTRAVENOUS ONCE
Status: DISCONTINUED | OUTPATIENT
Start: 2021-09-28 | End: 2021-09-28

## 2021-09-28 RX ORDER — SODIUM CHLORIDE 0.9 % (FLUSH) 0.9 %
5-40 SYRINGE (ML) INJECTION PRN
Status: DISCONTINUED | OUTPATIENT
Start: 2021-09-28 | End: 2021-09-28 | Stop reason: HOSPADM

## 2021-09-28 RX ORDER — FUROSEMIDE 20 MG/1
20 TABLET ORAL
Status: DISCONTINUED | OUTPATIENT
Start: 2021-09-28 | End: 2021-09-28 | Stop reason: HOSPADM

## 2021-09-28 RX ORDER — DEXAMETHASONE SODIUM PHOSPHATE 10 MG/ML
10 INJECTION INTRAMUSCULAR; INTRAVENOUS ONCE
Status: COMPLETED | OUTPATIENT
Start: 2021-09-28 | End: 2021-09-28

## 2021-09-28 RX ORDER — ONDANSETRON 2 MG/ML
4 INJECTION INTRAMUSCULAR; INTRAVENOUS EVERY 6 HOURS PRN
Status: DISCONTINUED | OUTPATIENT
Start: 2021-09-28 | End: 2021-09-28 | Stop reason: HOSPADM

## 2021-09-28 RX ORDER — LORAZEPAM 1 MG/1
0.5 TABLET ORAL EVERY 4 HOURS PRN
Status: DISCONTINUED | OUTPATIENT
Start: 2021-09-28 | End: 2021-09-28 | Stop reason: HOSPADM

## 2021-09-28 RX ORDER — PANTOPRAZOLE SODIUM 20 MG/1
20 TABLET, DELAYED RELEASE ORAL DAILY
Status: DISCONTINUED | OUTPATIENT
Start: 2021-09-29 | End: 2021-09-28 | Stop reason: HOSPADM

## 2021-09-28 RX ORDER — SODIUM CHLORIDE 9 MG/ML
25 INJECTION, SOLUTION INTRAVENOUS PRN
Status: DISCONTINUED | OUTPATIENT
Start: 2021-09-28 | End: 2021-09-28 | Stop reason: HOSPADM

## 2021-09-28 RX ORDER — IPRATROPIUM BROMIDE AND ALBUTEROL SULFATE 2.5; .5 MG/3ML; MG/3ML
1 SOLUTION RESPIRATORY (INHALATION)
Status: DISCONTINUED | OUTPATIENT
Start: 2021-09-28 | End: 2021-09-28 | Stop reason: HOSPADM

## 2021-09-28 RX ORDER — MECOBALAMIN 5000 MCG
5 TABLET,DISINTEGRATING ORAL NIGHTLY PRN
Status: DISCONTINUED | OUTPATIENT
Start: 2021-09-28 | End: 2021-09-28 | Stop reason: HOSPADM

## 2021-09-28 RX ORDER — SODIUM CHLORIDE 9 MG/ML
INJECTION, SOLUTION INTRAVENOUS CONTINUOUS
Status: DISCONTINUED | OUTPATIENT
Start: 2021-09-28 | End: 2021-09-28

## 2021-09-28 RX ORDER — SODIUM CHLORIDE 0.9 % (FLUSH) 0.9 %
5-40 SYRINGE (ML) INJECTION EVERY 12 HOURS SCHEDULED
Status: DISCONTINUED | OUTPATIENT
Start: 2021-09-28 | End: 2021-09-28 | Stop reason: HOSPADM

## 2021-09-28 RX ORDER — POLYETHYLENE GLYCOL 3350 17 G/17G
17 POWDER, FOR SOLUTION ORAL 2 TIMES DAILY
Status: DISCONTINUED | OUTPATIENT
Start: 2021-09-28 | End: 2021-09-28 | Stop reason: HOSPADM

## 2021-09-28 RX ORDER — CLOPIDOGREL BISULFATE 75 MG/1
75 TABLET ORAL DAILY
Status: DISCONTINUED | OUTPATIENT
Start: 2021-09-28 | End: 2021-09-28 | Stop reason: HOSPADM

## 2021-09-28 RX ORDER — DOCUSATE SODIUM 100 MG/1
100 CAPSULE, LIQUID FILLED ORAL 2 TIMES DAILY
Status: DISCONTINUED | OUTPATIENT
Start: 2021-09-28 | End: 2021-09-28 | Stop reason: HOSPADM

## 2021-09-28 RX ORDER — ALBUTEROL SULFATE 90 UG/1
2 AEROSOL, METERED RESPIRATORY (INHALATION) EVERY 6 HOURS PRN
Status: DISCONTINUED | OUTPATIENT
Start: 2021-09-28 | End: 2021-09-28 | Stop reason: HOSPADM

## 2021-09-28 RX ORDER — FLUTICASONE PROPIONATE 50 MCG
1 SPRAY, SUSPENSION (ML) NASAL DAILY PRN
Status: DISCONTINUED | OUTPATIENT
Start: 2021-09-28 | End: 2021-09-28 | Stop reason: HOSPADM

## 2021-09-28 RX ORDER — BUDESONIDE 0.5 MG/2ML
0.5 INHALANT ORAL 2 TIMES DAILY
Status: DISCONTINUED | OUTPATIENT
Start: 2021-09-28 | End: 2021-09-28 | Stop reason: HOSPADM

## 2021-09-28 RX ORDER — ATORVASTATIN CALCIUM 10 MG/1
20 TABLET, FILM COATED ORAL NIGHTLY
Status: DISCONTINUED | OUTPATIENT
Start: 2021-09-28 | End: 2021-09-28 | Stop reason: HOSPADM

## 2021-09-28 RX ORDER — LEVOFLOXACIN 5 MG/ML
500 INJECTION, SOLUTION INTRAVENOUS EVERY 24 HOURS
Status: DISCONTINUED | OUTPATIENT
Start: 2021-09-28 | End: 2021-09-28 | Stop reason: HOSPADM

## 2021-09-28 RX ORDER — POLYETHYLENE GLYCOL 3350 17 G/17G
17 POWDER, FOR SOLUTION ORAL DAILY PRN
Status: DISCONTINUED | OUTPATIENT
Start: 2021-09-28 | End: 2021-09-28 | Stop reason: HOSPADM

## 2021-09-28 RX ORDER — ACETAMINOPHEN 650 MG/1
650 SUPPOSITORY RECTAL EVERY 6 HOURS PRN
Status: DISCONTINUED | OUTPATIENT
Start: 2021-09-28 | End: 2021-09-28 | Stop reason: HOSPADM

## 2021-09-28 RX ORDER — FERROUS SULFATE 325(65) MG
325 TABLET ORAL
Status: DISCONTINUED | OUTPATIENT
Start: 2021-09-29 | End: 2021-09-28 | Stop reason: HOSPADM

## 2021-09-28 RX ORDER — SODIUM CHLORIDE 9 MG/ML
INJECTION, SOLUTION INTRAVENOUS CONTINUOUS
Status: DISCONTINUED | OUTPATIENT
Start: 2021-09-28 | End: 2021-09-28 | Stop reason: HOSPADM

## 2021-09-28 RX ORDER — IPRATROPIUM BROMIDE AND ALBUTEROL SULFATE 2.5; .5 MG/3ML; MG/3ML
1 SOLUTION RESPIRATORY (INHALATION) ONCE
Status: COMPLETED | OUTPATIENT
Start: 2021-09-28 | End: 2021-09-28

## 2021-09-28 RX ORDER — PREDNISONE 20 MG/1
40 TABLET ORAL DAILY
Status: DISCONTINUED | OUTPATIENT
Start: 2021-09-28 | End: 2021-09-28

## 2021-09-28 RX ORDER — ACETAMINOPHEN 325 MG/1
650 TABLET ORAL EVERY 6 HOURS PRN
Status: DISCONTINUED | OUTPATIENT
Start: 2021-09-28 | End: 2021-09-28 | Stop reason: HOSPADM

## 2021-09-28 RX ADMIN — DOCUSATE SODIUM 100 MG: 100 CAPSULE ORAL at 15:37

## 2021-09-28 RX ADMIN — MAGNESIUM GLUCONATE 500 MG ORAL TABLET 400 MG: 500 TABLET ORAL at 15:36

## 2021-09-28 RX ADMIN — LORAZEPAM 0.5 MG: 1 TABLET ORAL at 15:36

## 2021-09-28 RX ADMIN — FUROSEMIDE 20 MG: 20 TABLET ORAL at 15:37

## 2021-09-28 RX ADMIN — ROFLUMILAST 250 MCG: 500 TABLET ORAL at 15:37

## 2021-09-28 RX ADMIN — VANCOMYCIN HYDROCHLORIDE 1000 MG: 1 INJECTION, POWDER, LYOPHILIZED, FOR SOLUTION INTRAVENOUS at 11:44

## 2021-09-28 RX ADMIN — SODIUM CHLORIDE: 9 INJECTION, SOLUTION INTRAVENOUS at 13:30

## 2021-09-28 RX ADMIN — SERTRALINE HYDROCHLORIDE 50 MG: 50 TABLET ORAL at 15:37

## 2021-09-28 RX ADMIN — FUROSEMIDE 20 MG: 10 INJECTION, SOLUTION INTRAMUSCULAR; INTRAVENOUS at 08:44

## 2021-09-28 RX ADMIN — METOPROLOL TARTRATE 25 MG: 25 TABLET, FILM COATED ORAL at 15:37

## 2021-09-28 RX ADMIN — IPRATROPIUM BROMIDE AND ALBUTEROL SULFATE 1 AMPULE: .5; 3 SOLUTION RESPIRATORY (INHALATION) at 08:43

## 2021-09-28 RX ADMIN — CEFEPIME HYDROCHLORIDE 1000 MG: 1 INJECTION, POWDER, FOR SOLUTION INTRAMUSCULAR; INTRAVENOUS at 11:03

## 2021-09-28 RX ADMIN — METHYLPREDNISOLONE SODIUM SUCCINATE 40 MG: 40 INJECTION, POWDER, FOR SOLUTION INTRAMUSCULAR; INTRAVENOUS at 15:38

## 2021-09-28 RX ADMIN — DEXAMETHASONE SODIUM PHOSPHATE 10 MG: 10 INJECTION INTRAMUSCULAR; INTRAVENOUS at 08:43

## 2021-09-28 RX ADMIN — ENOXAPARIN SODIUM 30 MG: 30 INJECTION SUBCUTANEOUS at 15:38

## 2021-09-28 RX ADMIN — CLOPIDOGREL BISULFATE 75 MG: 75 TABLET ORAL at 15:37

## 2021-09-28 NOTE — Clinical Note
Patient Class: Inpatient [101]   REQUIRED: Diagnosis: Acute respiratory failure with hypoxia and hypercarbia ([facility] [[id number]]   Estimated Length of Stay: Estimated stay of more than 2 midnights   Telemetry/Cardiac Monitoring Required?: Yes

## 2021-09-28 NOTE — PROGRESS NOTES
Physician Progress Note      PATIENT:               Sia Ventura  CSN #:                  [id number]  :                       1940  ADMIT DATE:       2021 1:05 AM  DISCH DATE:        2021 8:30 PM  RESPONDING  PROVIDER #:        Ivon Lucas MD          QUERY TEXT:    Patient was admitted with a COPD exacerbation and acute hypoxic respiratory   failure (confirmed by a previous query response). They are noted to have   severe COPD and required treatment with BIPAP. Venous gas showed: PH 7.307,   PC02 65.4 on BIPAP with an Fio2 of 40. Patient was noted to have respiratory   distress with pursed lips breathing, prolonged expiratory phase and wheezing. Based on clinical indicators and treatment, can the patient's respiratory   condition be further specified as: The medical record reflects the following:  Risk Factors: advanced age, copd  Clinical Indicators:  Venous gas showed: PH 7.307, PC02 65.4 on BIPAP with an   Fio2 of 40. Patient was noted to have respiratory distress with pursed lips   breathing, prolonged expiratory phase and wheezing  Treatment: VBG, bipap, duonebs, prednisone    Thank you for your assistance,  Kayleen Akbar RN,BSN,CCDS,CRCR  Options provided:  -- Acute respiratory failure with hypercapnia  -- Acute respiratory failure with hypoxia  -- Patient has combined respiratory failure with hypoxia and hypercapnia  -- Other - I will add my own diagnosis  -- Disagree - Not applicable / Not valid  -- Disagree - Clinically unable to determine / Unknown  -- Refer to Clinical Documentation Reviewer    PROVIDER RESPONSE TEXT:    This patient has acute respiratory failure with hypercapnia.     Query created by: Madiha Panda on 2021 11:16 AM      Electronically signed by:  Ivon Lucas MD 2021 12:18 PM

## 2021-09-28 NOTE — PROGRESS NOTES
Report called to [facility]. Patient educated on discharge instructions as well as new medications use, dosage, administration and possible side effects. Patient verified knowledge. IV removed without difficulty and dry dressing in place. Telemetry monitor removed and returned to [facility]. Pt left facility in stable condition to Nursing Home with all of their personal belongings.

## 2021-09-28 NOTE — CONSULTS
Palliative Care Initial Note  Palliative Care Admit date: 09/28/21    Advance Directives: Full Code    Plan of care/goals: back to [facility]     Social/Spiritual: Prayer Support    Plan: writer spoke with pt's Rafiq Anthony, at bedside. Pt now on BiPAP. Family requesting to be there at [facility] when pt returns to help with pt's anxiety. Writer spoke with Verona Dailey with [facility] and she states will check with DON to see if family can come in initially to help keep the pt calm. Also, per Verona Dailey she will look into getting pt on Trilogy, prn anxiety medications, and check to see if pt would qualify for medicaid in the event that family would like hospice. Per Jamila Madrid she would like pt to remain full code at this time.      Reason for consult:    ___ Advance Care Planning  _x_ Transition of Care Planning  ___ Psychosocial/Spiritual Support  ___ Symptom Management    Yojana Fuentes RN Palliative Care

## 2021-09-28 NOTE — ED NOTES
Lab contacted to draw two sets of blood cx's. \"We have no one here now that can do it but I will leave them a message\".        Mima Orta RN  09/28/21 3263

## 2021-09-28 NOTE — CARE COORDINATION
Case Management Assessment  Initial Evaluation        Patient Name: Mark Blackburn  YOB: 1940  Diagnosis: Acute on chronic respiratory failure with hypoxemia [facility]) [J96.21]  Date / Time: 9/28/2021  10:48 AM     Admission status/Date:inpt  Chart Reviewed: Yes      Patient Interviewed: Yes   Family Interviewed:  Yes - dtr,Juan, at bedside       Hospitalization in the last 30 days: Yes        Health Care Decision Maker :   Primary Decision Maker: Roxy Yaomoe - Child - [phone]    Secondary Decision Maker: Hafsa Tapia - Niece/Nephew - [phone]    (CM - must 1st enter selection under Navigator - emergency contact- [place] Relationship and pick relationship)   Who do you trust or have selected to make healthcare decisions for you        Met with: pt and pt's dtr at bedside  Interview conducted  (bedside/phone):     Current PCP:   Susie Maurer MD        Financial  Medicare  Precert required for SNF : Y, N          3 night stay required - Y, N     ADLS  Support Systems/Care Needs:    Transportation: EMS       Housing  Living Arrangements: [address] [facility] for return to present living arrangements:   Identified Issues: no          DISCHARGE PLAN: Explained Case Management role/services. Reviewed chart. Pt from [facility] and plan return. See Palliative Care notes for d/c plan. Following.

## 2021-09-28 NOTE — CARE COORDINATION
Readmission Assessment  Number of Days since last admission?: 1-7 days  Previous Disposition: SNF  Who is being Interviewed: Caregiver  What was the patient's/caregiver's perception as to why they think they needed to return back to the hospital?: Other (Comment) (severe anxiety and COPD exacerbation)  Did you visit your Primary Care Physician after you left the hospital, before you returned this time?: No  Why weren't you able to visit your PCP?: Other (Comment) (returned to ER prior to meeting with specialist)  Did you see a specialist, such as Cardiac, Pulmonary, Orthopedic Physician, etc. after you left the hospital?: No  Who advised the patient to return to the hospital?: Skilled Unit  Does the patient report anything that got in the way of taking their medications?: No  In our efforts to provide the best possible care to you and others like you, can you think of anything that we could have done to help you after you left the hospital the first time, so that you might not have needed to return so soon?: Arrange for more help when leaving the hospital

## 2021-09-28 NOTE — PLAN OF CARE
Acute on chronic hypercarbic rep failure  dc'd from [facility] to SNF on 9/27  On BiPAP, admit to ICU  Recently completed 10 day course of vanc and cefepime, no further abx ordered  Prednisone 40 mg daily    Elevated Crt- 1.5. Hold buspar.   Give IVF    Pulm cs

## 2021-09-28 NOTE — PROGRESS NOTES
09/28/21 0626   NIV Type   Skin Protection for O2 Device Yes   Location Nose   NIV Started/Stopped On   Equipment Type v60   Mode Bilevel   Mask Type Full face mask   Mask Size Small   Settings/Measurements   IPAP 18 cmH20   CPAP/EPAP 8 cmH2O   Rate Ordered 16   Resp (!) 32   FiO2  60 %   I Time/ I Time % 0.9 s   Vt Exhaled 423 mL   Minute Volume 8 Liters   Mask Leak (lpm) 2 lpm   Comfort Level Good   Using Accessory Muscles No   SpO2 100   Alarm Settings   Alarms On Y   Press Low Alarm 5 cmH2O   High Pressure Alarm 30 cmH2O   Delay Alarm 20 sec(s)   Resp Rate Low Alarm 16   High Respiratory Rate 45 br/min

## 2021-09-28 NOTE — CARE COORDINATION
DISCHARGE ORDER  Date/Time 2021 1:33 PM  Completed by: Allyson Wharton RN, Case Management    Patient Name: Hali Kimbrough      : 1940  Admitting Diagnosis: Acute respiratory failure with hypoxia and hypercarbia ([facility] [J96.01, J96.02]      Admit order Date and Status:inpt  (verify MD's last order for status of admission)      Noted discharge order. If applicable PT/OT recommendation at Discharge: na  DME recommendation by PT/OT:na  Confirmed discharge plan  (pt's daughter,Juan):     Discharge Plan: Reviewed chart. Writer spoke with Janice Sandra concerning prn ativan and she has ordered prn ativan for the pt to receive prior to d/c. Writer spoke with bedside nurse,Kayy, and she is aware to give prn anxiety medications prior to d/c. Tequila Haddadet will be at [facility] at d/c to help keep the pt calm. Per Tessa Markham with [facility] she can accept the pt at d/c today and she is aware of 1600 pickup time with [facility]. [facility] aware pt needs ACLS and on BiPAP. Per Tessa Markham she will have Trilogy ready for the pt and FNP and two RT available to help facilitate this d/c. No other d/c needs voiced or identified. AVS faxed to [facility] and ambulance transport sheet sent to the ER. Reviewed chart. Role of discharge planner explained and patient verbalized understanding. Discharge order is noted. Has Home O2 in place on admit:  Per facility    Pt is being d/c'd to [facility] today. Discharge timeout done with Eugene Rahman. All discharge needs and concerns addressed.

## 2021-09-28 NOTE — CARE COORDINATION
Per Jonathan Debbie with [facility] she will have Trilogy ready for the pt when she returns to SNF and one family member is allowed to be present to help with pt's severe anxiety.  Will need anxiety medication on board prior to leaving the hospital.

## 2021-09-28 NOTE — ED NOTES
Report to [facility] Nurse. Pt leaving now / [facility]. Pt is alert and calm. No distress.         Mary Caceres RN  09/28/21 2485

## 2021-09-28 NOTE — ED PROVIDER NOTES
[address] ED  EMERGENCY DEPARTMENT ENCOUNTER      Pt Name: Abbi Aggarwal  MRN: [id number]  [place] 1940  Date of evaluation: 9/28/2021  Provider: Santiago Helms DO    CHIEF COMPLAINT       Chief Complaint   Patient presents with    Respiratory Distress     came from [facility] on 3 liters/NC sat was 85%, on NRBM sat %. hx of CHF and COPD         HISTORY OF PRESENT ILLNESS   (Location/Symptom, Timing/Onset, Context/Setting, Quality, Duration, Modifying Factors, Severity)  Note limiting factors. Abbi Aggarwal is a [de-identified] y.o. female who presents to the emergency department complaining of shortness of breath. From  [facility]. Patient was found to be hypoxic on 3 L nasal cannula satting mid 80s, did improve on nonrebreather for EMS. Does wear CPAP at night. On arrival patient tachypneic with severely diminished air movement throughout. Voices negative chest pain abdominal pain. Does complain of shortness of breath. Reportedly had a recent Covid test which was negative. By report being sad was also turned the patient may have not been acting appropriate for her. HPI is limited due to respiratory stress, patient placed on BiPAP. Patient was just discharged from hospital after 4-day stay for acute on chronic hypoxic hypercapnic respiratory failure with pneumonia treated with vancomycin, cefepime. Nursing Notes were reviewed.     PAST MEDICAL HISTORY     Past Medical History:   Diagnosis Date    NADJA (acute kidney injury) ([facility]     Asthma     Chronic anxiety     COPD (chronic obstructive pulmonary disease) ([facility]     GERD (gastroesophageal reflux disease) 9/9/2021    Hyperlipidemia     Hypertension     MRSA (methicillin resistant staph aureus) culture positive 09/22/2019    + resp cx    OA (osteoarthritis) 8/12/2014         SURGICAL HISTORY       Past Surgical History:   Procedure Laterality Date    BRONCHOSCOPY  09/08/2019    Dr. Gladys Valiente - w/BAL   [address] 09/05/2019    Dr. Vern Metz N/A 2/24/2020    COLONOSCOPY DIAGNOSTIC performed by Germán Correa DO at [address] N/A 9/19/2019    OFF PUMP CORONARY ARTERY BYPASS GRAFTING X2, INTERNAL MAMMARY ARTERY, SAPHENOUS VEIN GRAFT performed by Marquis Ishaan MD at [facility] CATH  9/20/2021    IR MIDLINE CATH 9/20/2021 [address] PROCEDURES    MASTECTOMY, PARTIAL Left     SIGMOIDOSCOPY  02/27/2020    4 bands    SIGMOIDOSCOPY N/A 2/27/2020    FLEX SIG W/ BANDING SIGMOIDOSCOPY DIAGNOSTIC FLEXIBLE performed by Germán Correa DO at [address]       Previous Medications    ACETAMINOPHEN (TYLENOL) 500 MG TABLET    Take 500 mg by mouth every 6 hours as needed for Pain    ALBUTEROL (PROVENTIL HFA) 108 (90 BASE) MCG/ACT INHALER    Inhale 2 puffs into the lungs every 6 hours as needed for Wheezing or Shortness of Breath.     ATORVASTATIN (LIPITOR) 20 MG TABLET    Take 1 tablet by mouth nightly    BUDESONIDE (PULMICORT) 0.5 MG/2ML NEBULIZER SUSPENSION    Take 2 mLs by nebulization 2 times daily    BUDESONIDE-FORMOTEROL (SYMBICORT) 160-4.5 MCG/ACT AERO    Inhale 2 puffs into the lungs 2 times daily    BUSPIRONE (BUSPAR) 5 MG TABLET    Take 1 tablet by mouth 3 times daily    CLOPIDOGREL (PLAVIX) 75 MG TABLET    Take 1 tablet by mouth daily    DOCUSATE SODIUM (COLACE, DULCOLAX) 100 MG CAPS    Take 100 mg by mouth 2 times daily    FERROUS SULFATE (IRON 325) 325 (65 FE) MG TABLET    Take 325 mg by mouth daily (with breakfast)    FLUTICASONE (FLONASE) 50 MCG/ACT NASAL SPRAY    1 spray by Each Nostril route daily as needed for Rhinitis    FUROSEMIDE (LASIX) 20 MG TABLET    Take 1 tablet by mouth See Admin Instructions Tuesday, friday    IPRATROPIUM-ALBUTEROL (DUONEB) 0.5-2.5 (3) MG/3ML SOLN NEBULIZER SOLUTION    Inhale 3 mLs into the lungs every 6 hours    MAGNESIUM OXIDE (MAG-OX) 400 (241.3 MG) MG TABS TABLET    Take 1 tablet by mouth daily    MELATONIN 5 MG TBDP DISINTEGRATING TABLET    Take 1 tablet by mouth nightly as needed (sleep)    MENTHOL-ZINC OXIDE (CALMOSEPTINE) 0.44-20.6 % OINT OINTMENT    Apply topically 2 times daily as needed (Apply to buttocks until resolved) Max 30 ml per day. METOPROLOL TARTRATE (LOPRESSOR) 25 MG TABLET    Take 1 tablet by mouth 2 times daily    ONDANSETRON (ZOFRAN-ODT) 4 MG DISINTEGRATING TABLET    Take 1 tablet by mouth every 8 hours as needed for Nausea or Vomiting    PANTOPRAZOLE (PROTONIX) 20 MG TABLET    Take 20 mg by mouth daily    POLYETHYLENE GLYCOL (GLYCOLAX) 17 G PACKET    Take 17 g by mouth 2 times daily    POTASSIUM & SODIUM PHOSPHATES (PHOS-NAK) 280-160-250 MG PACK    Take 1 packet by mouth daily    PREDNISONE (DELTASONE) 10 MG TABLET    Take 4 tabs a day for 5 days ,   then 3 tabs a day for 5 days,  Then 2 tabs a day for 5 days ,  Then 1 tab daily    ROFLUMILAST (DALIRESP) 250 MCG TABLET    Take 250 mcg by mouth daily    SERTRALINE (ZOLOFT) 50 MG TABLET    Take 50 mg by mouth daily    TIOTROPIUM (SPIRIVA HANDIHALER) 18 MCG INHALATION CAPSULE    Inhale 18 mcg into the lungs daily       ALLERGIES     Latex and Codeine    FAMILY HISTORY       Family History   Problem Relation Age of Onset    Cancer Mother     Heart Disease Father     Stroke Father     Heart Disease Sister     Stroke Sister           SOCIAL HISTORY       Social History     Socioeconomic History    Marital status:       Spouse name: None    Number of children: None    Years of education: None    Highest education level: None   Occupational History    None   Tobacco Use    Smoking status: Former Smoker     Packs/day: 0.50     Years: 50.00     Pack years: 25.00     Types: Cigarettes     Quit date: 2019     Years since quittin.0    Smokeless tobacco: Never Used    Tobacco comment: advised to quit   Vaping Use    Vaping Use: Never assessed   Substance and Sexual Activity  Alcohol use: No    Drug use: No    Sexual activity: Not Currently     Comment: tabacco- Pk Day   Other Topics Concern    None   Social History Narrative    None     Social Determinants of Health     Financial Resource Strain:     Difficulty of Paying Living Expenses:    Food Insecurity:     Worried About Running Out of Food in the Last Year:     Ran Out of Food in the Last Year:    Transportation Needs:     Lack of Transportation (Medical):  Lack of Transportation (Non-Medical):    Physical Activity:     Days of Exercise per Week:     Minutes of Exercise per Session:    Stress:     Feeling of Stress :    Social Connections:     Frequency of Communication with Friends and Family:     Frequency of Social Gatherings with Friends and Family:     Attends Zoroastrian Services:     Active Member of Clubs or Organizations:     Attends Club or Organization Meetings:     Marital Status:    Intimate Partner Violence:     Fear of Current or Ex-Partner:     Emotionally Abused:     Physically Abused:     Sexually Abused:        SCREENINGS                            REVIEW OF SYSTEMS    (2-9 systems for level 4, 10 or more for level 5)   Review of Systems   Unable to perform ROS: Acuity of condition         PHYSICAL EXAM    (up to 7 for level 4, 8 or more for level 5)   RECENT VITALS:     Temp: 97.6 °F (36.4 °C),  Pulse: 77, Resp: 22, BP: 97/63, SpO2: 100 %    Physical Exam  Constitutional:       Appearance: She is ill-appearing. She is not diaphoretic. HENT:      Head: Normocephalic and atraumatic. Eyes:      Pupils: Pupils are equal, round, and reactive to light. Neck:      Trachea: No tracheal deviation. Cardiovascular:      Rate and Rhythm: Regular rhythm. Tachycardia present. Pulmonary:      Effort: Respiratory distress present. Comments: Severely diminished throughout  Abdominal:      General: There is no distension. Palpations: Abdomen is soft. Tenderness:  There is no abdominal tenderness. Musculoskeletal:         General: Normal range of motion. Cervical back: Normal range of motion and neck supple. Skin:     General: Skin is warm. Findings: Bruising present. Neurological:      Mental Status: She is oriented to person, place, and time. DIAGNOSTIC RESULTS     EKG: All EKG's are interpreted by the Emergency Department Physician who either signs or Co-signs this chart in the absence of a cardiologist.      The Ekg interpreted by me shows  Sinus rhythm sinus arrhythmia with a rate of 84  Axis is   Normal  QTc is  within an acceptable range  Intervals and Durations are unremarkable. ST Segments: no acute change    No significant change from prior EKG dated 9/23/2021        RADIOLOGY:   Non-plain film images such as CT, Ultrasound and MRI are read by the radiologist. Plain radiographic images are visualized and preliminarily interpreted by the emergency physician. Interpretation per the Radiologist below, if available at the time of this note:    XR CHEST PORTABLE   Final Result   Improved aeration of the  lungs.   Bilateral airspace disease and pleural   effusions remain               LABS:  Labs Reviewed   CBC WITH AUTO DIFFERENTIAL - Abnormal; Notable for the following components:       Result Value    WBC 15.3 (*)     MCHC 29.8 (*)     RDW 21.2 (*)     Neutrophils Absolute 14.0 (*)     Lymphocytes Absolute 0.5 (*)     All other components within normal limits    Narrative:     Performed at:  [facility],  [place], [facility]   Phone [phone]   COMPREHENSIVE METABOLIC PANEL W/ REFLEX TO MG FOR LOW K - Abnormal; Notable for the following components:    Glucose 147 (*)     BUN 27 (*)     CREATININE 1.5 (*)     GFR Non- 33 (*)     GFR  40 (*)     All other components within normal limits    Narrative:     Performed at:  [facility]  [facility],  [place], [employer]   Phone [phone]   BLOOD GAS, VENOUS - Abnormal; Notable for the following components:    pH, Zach 7.068 (*)     pCO2, Zach 104.2 (*)     HCO3, Venous 29.4 (*)     Base Excess, Zach -3.8 (*)     All other components within normal limits    Narrative:     Jennyfer Zuñigajonathan  [facility] tel. [phone],  Chemistry results called to and read back by Tita Pérez RN, 09/28/2021  07:48, by Mariann Richey  Performed at:  [facility],  [place], [employer]   Phone [phone]   PROTIME-INR - Abnormal; Notable for the following components:    Protime 9.5 (*)     INR 0.85 (*)     All other components within normal limits    Narrative:     Performed at:  [place] [address],  [place], [employer]   Phone [phone]   PROCALCITONIN - Abnormal; Notable for the following components:    Procalcitonin 1.00 (*)     All other components within normal limits    Narrative:     Performed at:  [place] [address],  [place], [employer]   Phone [phone]   BRAIN NATRIURETIC PEPTIDE - Abnormal; Notable for the following components:    Pro-BNP 34,818 (*)     All other components within normal limits    Narrative:     Performed at:  [facility],  [place], [employer]   Phone [phone]   TROPONIN - Abnormal; Notable for the following components:    Troponin 0.10 (*)     All other components within normal limits    Narrative:     Performed at:  [place] [address],  [place], [employer]   Phone [phone]    Narrative:     Performed at:  [facility]) [facility],  [place], [employer]   Phone [phone]   CULTURE, BLOOD 2   CULTURE, BLOOD 1   CULTURE, URINE   APTT    Narrative:     Performed at:  [facility] - [facility],  [place], [facility]   Phone [phone]   LACTATE, SEPSIS    Narrative:     Performed at:  [facility]) - [facility],  [place], [facility]   Phone [phone]   URINALYSIS   LACTATE, SEPSIS   BLOOD GAS, VENOUS       All other labs were within normal range or not returned as of this dictation. EMERGENCY DEPARTMENT COURSE and DIFFERENTIAL DIAGNOSIS/MDM:   Asa Chan is a [de-identified] y.o. female who presents to the emergency department with the complaint of patient arrives from [facility] with respiratory stress. History of COPD CHF. Severely diminished breath sounds throughout, she is tachypneic on arrival on nonrebreather satting in the upper 90s. She is tachycardic. No signs of lower extremity swelling or asymmetry. She is afebrile. Reportedly hypoxic on 3 L nasal cannula at care facility. Declining mental status. On arrival she voices no to chest pain or abdominal pain or headache complaints. Reported had a recent negative Covid test.  Will screen for Covid here will place patient on BiPAP, breathing treatments steroids. Intensive admission due to increased respiratory effort, hypoxia. She is tachycardic here she is afebrile up with due to vital instability meeting SIRS criteria suspected respiratory, sepsis labs were initiated. Patient reevaluated on BiPAP, improved aeration, worsening rhonchi, rales to right lung fields. Heart rate downtrending    Patient VBG concerning showing pH 7.06 with a PCO2 of 104. Restarted vancomycin, cefepime for healthcare associated pneumonia. After evaluation following initial blood gas while on BiPAP patient is more alert. Will repeat 1 hour on BiPAP.     Significant proBNP elevation, will treat with Lasix    Hospitalist, critical care paged for admission for respiratory failure          CRITICAL CARE TIME   Total Critical Care time was 45 minutes, excluding separately reportable procedures. There was a high probability of clinically significant/life threatening deterioration in the patient's condition which required my urgent intervention. Clinical concern acute on chronic respiratory failure with hypoxia and hypercapnia, healthcare associated pneumonia,  Intervention chart review, physical exam, initiating BiPAP, multiple reevaluations to evaluate respiratory status, lab interpretation, medication management, charting,    CONSULTS:  [address] TO HOSPITALIST    PROCEDURES:  Unless otherwise noted below, none     Procedures        FINAL IMPRESSION      1. Acute respiratory failure with hypoxia and hypercapnia (HCC)    2. Healthcare-associated pneumonia    3. Congestive heart failure, unspecified HF chronicity, unspecified heart failure type ([facility]    4. Elevated serum creatinine          DISPOSITION/PLAN   DISPOSITION  admit      PATIENT REFERRED TO:  No follow-up provider specified. DISCHARGE MEDICATIONS:  New Prescriptions    No medications on file     Controlled Substances Monitoring:     No flowsheet data found.     (Please note that portions of this note were completed with a voice recognition program.  Efforts were made to edit the dictations but occasionally words are mis-transcribed.)    Ian Aponte DO (electronically signed)  Attending Emergency Physician            Ian Aponte DO  09/28/21 0144

## 2021-09-28 NOTE — CONSULTS
Patient is being seen at the request of Avila Turcios DO for a consultation for Recurrent hypercapnic hypoxic respiratory failure, HAP.  On BiPAP    HISTORY OF PRESENT ILLNESS:   [de-identified]years old with history of COPD presented from the [facility] with hypoxemia, desaturation in the 80s on 3 L O2. Associated with SOB. VBG showed hypercapnia. In ED found to be in respiratory distress placed on BiPAP. Patient was discharged yesterday after admission for COPD exacerbation completed 10 days of vancomycin and cefepime. No smoking. Cough with no sputum. Patient is on BiPAP nonverbal not able to obtain further HPI.         PAST MEDICAL HISTORY:  Past Medical History:   Diagnosis Date    NADJA (acute kidney injury) ([address]     Asthma     Chronic anxiety     [facility]     GERD (gastroesophageal reflux disease) 9/9/2021    Hyperlipidemia     Hypertension     MRSA (methicillin resistant staph aureus) culture positive 09/22/2019    + resp cx    OA (osteoarthritis) 8/12/2014     PAST SURGICAL HISTORY:  Past Surgical History:   Procedure Laterality Date    BRONCHOSCOPY  09/08/2019    Dr. Marcos Palmer - w/BAL   [address]  09/05/2019    Dr. Ravi Gilman 2/24/2020    COLONOSCOPY DIAGNOSTIC performed by Vicki Blackman DO at [address] N/A 9/19/2019    OFF PUMP CORONARY ARTERY BYPASS GRAFTING X2, INTERNAL MAMMARY ARTERY, SAPHENOUS VEIN GRAFT performed by Raffaele Beatty MD at [facility] CATH  9/20/2021    IR MIDLINE CATH 9/20/2021 [address]    MASTECTOMY, PARTIAL Left     SIGMOIDOSCOPY  02/27/2020    4 bands    SIGMOIDOSCOPY N/A 2/27/2020    FLEX SIG W/ BANDING SIGMOIDOSCOPY DIAGNOSTIC FLEXIBLE performed by Vicki Blackman DO at [address]:  family history includes Cancer in her mother; Heart Disease in her father and sister; Stroke in her father and sister. SOCIAL HISTORY:   reports that she quit smoking about 2 years ago. Her smoking use included cigarettes. She has a 25.00 pack-year smoking history. She has never used smokeless tobacco.    Scheduled Meds:   cefepime  1,000 mg IntraVENous Once    vancomycin  1,000 mg IntraVENous Once     Continuous Infusions:    PRN Meds:      ALLERGIES:  Patient is allergic to latex and codeine. REVIEW OF SYSTEMS: On BiPAP noncommunicative limited to obtain  Constitutional: Negative for fever  HENT: Negative for sore throat  Eyes: Negative for redness   Respiratory: + Dyspnea, cough  Cardiovascular: Negative for chest pain  Gastrointestinal: Negative for vomiting, diarrhea   Genitourinary: Negative for hematuria   Musculoskeletal: + Arthralgias   Skin: Negative for rash  Neurological: Negative for syncope  Hematological: Negative for adenopathy  Psychiatric/Behavorial: Negative for anxiety    PHYSICAL EXAM:  Blood pressure 97/63, pulse 77, temperature 97.6 °F (36.4 °C), temperature source Axillary, resp. rate 22, height 5' 2\" (1.575 m), weight 107 lb (48.5 kg), SpO2 100 %, not currently breastfeeding.' on BiPAP 18/840% FiO2  Gen: + Distress. Ill-appearing  Eyes: PERRL. No sclera icterus. No conjunctival injection. ENT: No discharge. Pharynx clear. Neck: Trachea midline. No obvious mass. Resp: + accessory muscle use. No crackles. Bilateral wheezes. No rhonchi. No dullness on percussion. CV: Regular rate. Regular rhythm. No murmur or rub. No edema. GI: Non-tender. Non-distended. No hernia. Skin: Warm and dry. No nodule on exposed extremities. Lymph: No cervical LAD. No supraclavicular LAD. M/S: No cyanosis. No joint deformity. No clubbing. Neuro: Awake. Alert. Moves all four extremities. Psych: No agitation. No anxiety.      LABS:  CBC:   Recent Labs     09/28/21  0706   WBC 15.3*   HGB 12.9   HCT 43.1   MCV 92.0        BMP:   Recent Labs     09/26/21  0417 09/27/21  4037 09/28/21  0706    145 143   K 3.4* 4.1 4.9    111* 106   CO2 22 24 28   PHOS 2.3*  --   --    BUN 23* 24* 27*   CREATININE 1.2 1.3* 1.5*     LIVER PROFILE:   Recent Labs     09/28/21  0706   AST 23   ALT 18   BILITOT 0.4   ALKPHOS 104     PT/INR:   Recent Labs     09/28/21  0706   PROTIME 9.5*   INR 0.85*     APTT:   Recent Labs     09/28/21  0706   APTT 30.4     UA:No results for input(s): NITRITE, COLORU, PHUR, LABCAST, WBCUA, RBCUA, MUCUS, TRICHOMONAS, YEAST, BACTERIA, CLARITYU, SPECGRAV, LEUKOCYTESUR, UROBILINOGEN, BILIRUBINUR, BLOODU, GLUCOSEU, AMORPHOUS in the last 72 hours. Invalid input(s): KETONESU  No results for input(s): PHART, [id number], PO2ART in the last 72 hours. Chest x-ray 9/28 imaging was reviewed by me and showed   Bilateral interstitial infiltrates-somewhat improving    Echo 8/29/2021   -- Normal left ventricular systolic function with ejection fraction of   55-60%. No regional wall motion abnormalites are seen. Left ventricular   diastolic filling pressure is elevated. Compared to previous study from 9-4-2019 no changes noted in left   ventricular function. Mild mitral regurgitation    ASSESSMENT:  · Acute on chronic hypoxemic hypercapnic respiratory failure  · Recurrent life-threatening hypercapnic respiratory failure  · End-stage COPD with acute exacerbation  · Abnormal chest x-ray with interstitial infiltrates-resolving pneumonia. Cannot exclude superimposed infection. · Acute kidney injury  · Troponin-probably demand ischemia  · Elevated procalcitonin with leukocytosis  · CHF    PLAN:  Noninvasive positive pressure ventilation per my orders-BiPAP 18/8  Check ABG now  Supplemental oxygen to maintain SaO2 >92%; wean as tolerated  Intensive inhaled bronchodilator therapy  IV solumedrol 40 mg IV Q12 hrs.  Plan to switch to oral prednisone taper when improved  IV antibiotics to include Levaquin for now  Urine culture, blood culture and Sputum GS&C  Speech pathology evaluation  PT OT  IV fluid and follow electrolytes/creatinine  Follow troponin cardiology consult  Aspirin, statin, beta-blocker  Lovenox for DVT prophylaxis  Discussed with daughter at the bedside

## 2021-09-28 NOTE — ED NOTES
Daughter Glendynisreen James here at bedside.  Has been updated / Chaparro Calderon RN  09/28/21 1197

## 2021-09-28 NOTE — PROGRESS NOTES
09/28/21 1114   NIV Type   Mode Bilevel   Mask Type Full face mask   Mask Size Small   Settings/Measurements   IPAP 18 cmH20   CPAP/EPAP 8 cmH2O   Rate Ordered 16   Resp 18   FiO2  40 %   Vt Exhaled 460 mL   Comfort Level Good   SpO2 95

## 2021-09-28 NOTE — H&P
Combined history and Physical and Discharge Summary        HISTORY OF PRESENT ILLNESS:         67-year-old female with end-stage COPD, severe anxiety,  Malnutrition with recurrent admissions for acute on chronic hypoxic hypercapnic respiratory failure. Each time she gets discharged she comes back within a few hours with an anxiety episode. She was initially discharged to home last week came back, and then plans were made to discharge her to skilled nursing facility. Patient was discharged to SNF, to [facility] yesterday. She again had an anxiety attack there,  became increasingly short of breath , and presented back to the emergency department . Patient is currently back on BiPAP, she is awake, alert and well oriented ; asking all the right questions and again extremely anxious. I had a long discussion with patient and patient's daughter and neice at bedside. I had a long discussion with the patient discharge planner here, Who has discussed with administration and nursing staff at [facility]  Patient needs more one-on-one care  At [facility] to make sure her home astral unit has been set up properly ; the patient and family feel that her anxiety medications need to be changed. I am going to stop her BuSpar and give her Ativan here and give her a prescription for Ativan on discharge.   Discharge planner has made arrangements for a nurse practitioner to be there when patient arrives there at [facility] with respiratory therapist to make sure she is properly situated from a COPD standpoint and anxiety is addressed  Patient otherwise is exactly how she was on discharge yesterday, she is at her baseline    Past Medical History:   Diagnosis Date    [facility]     Asthma     Chronic anxiety     COPD (chronic obstructive pulmonary disease) [facility]     GERD (gastroesophageal reflux disease) 9/9/2021    Hyperlipidemia     Hypertension     MRSA (methicillin resistant staph aureus) culture positive 09/22/2019    + resp cx    OA (osteoarthritis) 8/12/2014       Past Surgical History:   Procedure Laterality Date    BRONCHOSCOPY  09/08/2019    Dr. Kortney Greenberg - w/BAL   [address]  09/05/2019    Dr. Daryl Ross N/A 2/24/2020    COLONOSCOPY DIAGNOSTIC performed by Rima Michelle DO at [address] N/A 9/19/2019    OFF PUMP CORONARY ARTERY BYPASS GRAFTING X2, INTERNAL MAMMARY ARTERY, SAPHENOUS VEIN GRAFT performed by Saranya Nelson MD at [address] CATH  9/20/2021    IR MIDLINE CATH 9/20/2021 [address]    MASTECTOMY, PARTIAL Left     SIGMOIDOSCOPY  02/27/2020    4 bands    SIGMOIDOSCOPY N/A 2/27/2020    FLEX SIG W/ BANDING SIGMOIDOSCOPY DIAGNOSTIC FLEXIBLE performed by Rima Michelle DO at [address] [facility]       Patient is allergic to latex and codeine. Prior to Admission medications    Medication Sig Start Date End Date Taking? Authorizing Provider   busPIRone (BUSPAR) 5 MG tablet Take 1 tablet by mouth 3 times daily 9/27/21   Pete Buck MD   ondansetron (ZOFRAN-ODT) 4 MG disintegrating tablet Take 1 tablet by mouth every 8 hours as needed for Nausea or Vomiting 9/27/21   Pete Buck MD   predniSONE (DELTASONE) 10 MG tablet Take 4 tabs a day for 5 days ,   then 3 tabs a day for 5 days,  Then 2 tabs a day for 5 days ,  Then 1 tab daily 9/27/21   Pete Buck MD   furosemide (LASIX) 20 MG tablet Take 1 tablet by mouth See Admin Instructions Tuesday, friday 9/27/21   Pete Buck MD   magnesium oxide (MAG-OX) 400 (241.3 Mg) MG TABS tablet Take 1 tablet by mouth daily 9/28/21   Pete Buck MD   potassium & sodium phosphates (PHOS-NAK) 280-160-250 MG PACK Take 1 packet by mouth daily 9/27/21   Pete Buck MD   menthol-zinc oxide (CALMOSEPTINE) 0.44-20.6 % OINT ointment Apply topically 2 times daily as needed (Apply to buttocks until resolved) Max 30 ml per day.  9/14/21   Amos Shingles needed for Wheezing or Shortness of Breath. 12   Tami Milligan MD       Social History     Socioeconomic History    Marital status:      Spouse name: None    Number of children: None    Years of education: None    Highest education level: None   Occupational History    None   Tobacco Use    Smoking status: Former Smoker     Packs/day: 0.50     Years: 50.00     Pack years: 25.00     Types: Cigarettes     Quit date: 2019     Years since quittin.0    Smokeless tobacco: Never Used    Tobacco comment: advised to quit   Vaping Use    Vaping Use: Never assessed   Substance and Sexual Activity    Alcohol use: No    Drug use: No    Sexual activity: Not Currently     Comment: tabacco- Pk Day   Other Topics Concern    None   Social History Narrative    None     Social Determinants of Health     Financial Resource Strain:     Difficulty of Paying Living Expenses:    Food Insecurity:     Worried About Running Out of Food in the Last Year:     Ran Out of Food in the Last Year:    Transportation Needs:     Lack of Transportation (Medical):      Lack of Transportation (Non-Medical):    Physical Activity:     Days of Exercise per Week:     Minutes of Exercise per Session:    Stress:     Feeling of Stress :    Social Connections:     Frequency of Communication with Friends and Family:     Frequency of Social Gatherings with Friends and Family:     Attends Congregational Services:     Active Member of Clubs or Organizations:     Attends Club or Organization Meetings:     Marital Status:    Intimate Partner Violence:     Fear of Current or Ex-Partner:     Emotionally Abused:     Physically Abused:     Sexually Abused:        Family History   Problem Relation Age of Onset    Cancer Mother     Heart Disease Father     Stroke Father     Heart Disease Sister     Stroke Sister        REVIEW OF SYSTEMS:   Constitutional: Negative for fever   HEENT: Negative for sore throat   Eyes: Negative for redness   Respiratory: + for dyspnea,  No cough   Cardiovascular: Negative for chest pain   Gastrointestinal: Negative for vomiting, diarrhea   Genitourinary: Negative for hematuria   Musculoskeletal: Negative for arthralgias   Skin: Negative for rash   Neurological: Negative for syncope   Hematological: Negative for adenopathy   Psychiatric/Behavorial: ++ for anxiety    On Physical Examination    Vitals:    09/28/21 1230   BP: 130/71   Pulse: 85   Resp: 29   Temp:    SpO2: 100%     Afebrile   Gen: No distress. Alert. Well oriented. Anxious. Thin built. Chronically ill-appearing  Eyes: PERRL. No sclera icterus. No conjunctival injection. ENT: No discharge. Pharynx clear. Neck: Trachea midline. Normal thyroid. Resp: No accessory muscle use. Very diminished breath sounds no crackles. No wheezes. No rhonchi. No dullness on percussion. CV: Regular rate. Regular rhythm. No murmur or rub. No edema. GI: Non-tender. Non-distended. No masses. No organomegaly. Normal bowel sounds. No hernia. Skin: Warm and dry. No nodule on exposed extremities. No rash on exposed extremities. Lymph: No cervical LAD. No supraclavicular LAD. M/S: No cyanosis. No joint deformity. No clubbing. Intact peripheral pulses. Brisk cap refill, < 2 secs  Neuro: Awake.  Reflexes 2+ symmetric bilaterally   Psych: Oriented x 3. + anxiety     CBC:   Recent Labs     09/28/21  0706   WBC 15.3*   HGB 12.9   HCT 43.1   MCV 92.0        BMP:   Recent Labs     09/26/21  0417 09/27/21  0507 09/28/21  0706    145 143   K 3.4* 4.1 4.9    111* 106   CO2 22 24 28   PHOS 2.3*  --   --    BUN 23* 24* 27*   CREATININE 1.2 1.3* 1.5*     LIVER PROFILE:   Recent Labs     09/28/21  0706   AST 23   ALT 18   BILITOT 0.4   ALKPHOS 104     PT/INR:   Recent Labs     09/28/21  0706   PROTIME 9.5*   INR 0.85*     APTT:   Recent Labs     09/28/21  0706   APTT 30.4     UA:No results for input(s): NITRITE, COLORU, PHUR, LABCAST, Pennelope Faden, MUCUS, TRICHOMONAS, YEAST, BACTERIA, CLARITYU, SPECGRAV, LEUKOCYTESUR, UROBILINOGEN, BILIRUBINUR, BLOODU, GLUCOSEU, AMORPHOUS in the last 72 hours. Invalid input(s): Pinkie Mcburney    Radiology  XR CHEST PORTABLE   Final Result   Improved aeration of the  lungs. Bilateral airspace disease and pleural   effusions remain           Old records from recent admissions all reviewed            ASSESSMENT AND PLAN:    Patient with recurrent admissions for acute on chronic hypoxic hypercapnic respiratory failure secondary to underlying severe advanced COPD, severe anxiety. This episode precipitated by anxiety and panic attack. I have discussed with patient and family and discharge planner at length. Acute on chronic hypoxic & hypercarbic respiratory failure  With end-stage COPD   - 5th admission for same this month alone.   - Chest d/c'd to SNF yesterday. She has new home bipap- astral unit . This was trialed and used in the hospital patient tolerated it well, prior to discharge to SNF yesterday. family also believes that if her BiPAP was placed back during her panic attack yesterday she would have done well. Patient also felt that there was more education needed at SNF to help her with her anxiety episodes.  -We have had discussions with the SNF. Patient will be discharged back. Complete steroid taper.  - Currently on 3 L of oxygen,  Resumed home bipap, use her home astral unit. Continue BiPAP nightly and as needed. Recently completed a 10-day course of IV antibiotics for HCAP. No need for any further antibiotics no recurrent pneumonia.     CAD/HTN  - continued home BB, Plavix       GERD  - on ppi      Chronic anxiety  She has severe anxiety  - on zoloft , buspar   Patient and family believe BuSpar is not helping her anymore. We will switch her to Ativan as needed     Generalized weakness  - cont PT, OT  - discharged to SNF       Her discharge plan is the same as yesterday.   Continuing her anxiety medicine from BuSpar to Ativan and giving her a prescription for that. She can be discharged back to the nursing home . Discharge:    Date of discharge 9/28/2021  Discharge condition stable    Discharge medications         Medication List      START taking these medications    LORazepam 0.5 MG tablet  Commonly known as: ATIVAN  Take 1 tablet by mouth every 4 hours as needed for Anxiety for up to 3 days. CONTINUE taking these medications    acetaminophen 500 MG tablet  Commonly known as: TYLENOL     albuterol sulfate  (90 Base) MCG/ACT inhaler  Commonly known as: Proventil HFA  Inhale 2 puffs into the lungs every 6 hours as needed for Wheezing or Shortness of Breath.      atorvastatin 20 MG tablet  Commonly known as: LIPITOR  Take 1 tablet by mouth nightly     budesonide 0.5 MG/2ML nebulizer suspension  Commonly known as: PULMICORT  Take 2 mLs by nebulization 2 times daily     clopidogrel 75 MG tablet  Commonly known as: PLAVIX  Take 1 tablet by mouth daily     Daliresp 250 MCG tablet  Generic drug: Roflumilast     docusate 100 MG Caps  Commonly known as: COLACE, DULCOLAX  Take 100 mg by mouth 2 times daily     ferrous sulfate 325 (65 Fe) MG tablet  Commonly known as: IRON 325     fluticasone 50 MCG/ACT nasal spray  Commonly known as: FLONASE  1 spray by Each Nostril route daily as needed for Rhinitis     furosemide 20 MG tablet  Commonly known as: LASIX  Take 1 tablet by mouth See Admin Instructions Tuesday, friday     ipratropium-albuterol 0.5-2.5 (3) MG/3ML Soln nebulizer solution  Commonly known as: DUONEB  Inhale 3 mLs into the lungs every 6 hours     magnesium oxide 400 (241.3 Mg) MG Tabs tablet  Commonly known as: MAG-OX  Take 1 tablet by mouth daily     melatonin 5 MG Tbdp disintegrating tablet  Take 1 tablet by mouth nightly as needed (sleep)     menthol-zinc oxide 0.44-20.6 % Oint ointment  Commonly known as: CALMOSEPTINE  Apply topically 2 times daily as needed (Apply to buttocks until resolved) Max 30 ml per day. metoprolol tartrate 25 MG tablet  Commonly known as: LOPRESSOR  Take 1 tablet by mouth 2 times daily     ondansetron 4 MG disintegrating tablet  Commonly known as: ZOFRAN-ODT  Take 1 tablet by mouth every 8 hours as needed for Nausea or Vomiting     pantoprazole 20 MG tablet  Commonly known as: PROTONIX     polyethylene glycol 17 g packet  Commonly known as: GLYCOLAX  Take 17 g by mouth 2 times daily     potassium & sodium phosphates 280-160-250 MG Pack  Commonly known as: PHOS-NAK  Take 1 packet by mouth daily     predniSONE 10 MG tablet  Commonly known as: DELTASONE  Take 4 tabs a day for 5 days ,   then 3 tabs a day for 5 days,  Then 2 tabs a day for 5 days ,  Then 1 tab daily     sertraline 50 MG tablet  Commonly known as: ZOLOFT     Spiriva HandiHaler 18 MCG inhalation capsule  Generic drug: tiotropium     Symbicort 160-4.5 MCG/ACT Aero  Generic drug: budesonide-formoterol        STOP taking these medications    busPIRone 5 MG tablet  Commonly known as: BUSPAR           Where to Get Your Medications      You can get these medications from any pharmacy    Bring a paper prescription for each of these medications  · LORazepam 0.5 MG tablet         Total time today 50 minutes.  > 50 % time dominated by coordination of care and D/W family      Pavan Uriostegui MD 9/28/2021 12:52 PM

## 2021-09-28 NOTE — ED NOTES
Panic Lab  hgb 5.9  Hct 22.4   Dr Shiv Bradley notified. One unit prbc has been given. Orders to transfuse two more units. Next unit has been requested to be released / lab.           Kristal Carver RN  09/28/21 5625

## 2021-09-28 NOTE — ED NOTES
Pt is being sent back to [address] [facility] by [facility]. Family is to meet pt getting over at nursing home. PO Ativan please before going over.       Kalyn Munguia  09/28/21 7636

## 2021-09-28 NOTE — TELEPHONE ENCOUNTER
Writer contacted ED provider Fatuma White  to inform of 30 day readmission risk. ED provider informed writer of possible readmission.

## 2021-09-28 NOTE — DISCHARGE INSTR - COC
Continuity of Care Form    Patient Name: Reji Martinez   :  1940  MRN:  [id number]    Admit date:  2021  Discharge date:  ***    Code Status Order: Full Code   Advance Directives:     Admitting Physician:  No admitting provider for patient encounter.   PCP: Thai Holbrook MD    Discharging Nurse: [facility] Unit/Room#:   Discharging Unit Phone Number: ***    Emergency Contact:   Extended Emergency Contact Information  Primary Emergency Contact: [address] of [address] [place] [address] Phone: [phone]  Relation: Child  Secondary Emergency Contact: [address] [facility] Phone: [phone]  Relation: Other    Past Surgical History:  Past Surgical History:   Procedure Laterality Date    BRONCHOSCOPY  2019    Dr. Eduard Houston - w/BAL   [address]  2019    Dr. Jorge Luis Rocha 2020    COLONOSCOPY DIAGNOSTIC performed by Joao Sheriff DO at [address] N/A 2019    OFF PUMP CORONARY ARTERY BYPASS GRAFTING X2, INTERNAL MAMMARY ARTERY, SAPHENOUS VEIN GRAFT performed by Charline Carias MD at [facility] CATH  2021    IR MIDLINE CATH 2021 [facility], PARTIAL Left     SIGMOIDOSCOPY  2020    4 bands    SIGMOIDOSCOPY N/A 2020    FLEX SIG W/ BANDING SIGMOIDOSCOPY DIAGNOSTIC FLEXIBLE performed by Joao Sheriff DO at [address] [place] [address]       Immunization History:   Immunization History   Administered Date(s) Administered    COVID-19, Pfizer, PF, 30mcg/0.3mL 2021, 2021    Influenza Virus Vaccine 10/17/2013    Influenza, Yeimi Skill, 6 mo and older, IM, PF (Flulaval, Fluarix) 2019    Influenza, Quadv, IM, PF (6 mo and older Fluzone, Flulaval, Fluarix, and 3 yrs and older Afluria) 10/04/2019    Pneumococcal Conjugate 13-valent ([id number]) 2017    Pneumococcal Polysaccharide ([id number]) 02/19/2015    Tdap (Boostrix, Adacel) 07/17/2018       Active Problems:  Patient Active Problem List   Diagnosis Code    Hyperlipidemia E78.5    Asthma J45.909    OA (osteoarthritis) M19.90    Tobacco use Z72.0    COPD exacerbation ([facility] J44.1    Sepsis [facility] A41.9    Abnormal chest x-ray R93.89    COPD with acute exacerbation ([facility]) J44.1    Shortness of breath R06.02    Tobacco abuse Z72.0    Moderate malnutrition ([facility]) E44.0    NSTEMI (non-ST elevated myocardial infarction) ([facility]) I21.4    Elevated troponin R77.8    Acute respiratory failure with hypoxia ([facility]) J96.01    CAD (coronary artery disease) I25.10    Acute pulmonary edema ([facility]) J81.0    Pulmonary infiltrate R91.8    Elevated brain natriuretic peptide (BNP) level R79.89    Leukocytosis D72.829    Ischemic cardiomyopathy I25.5    Acute combined systolic and diastolic congestive heart failure (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) J15.212    GI bleed K92.2    Severe malnutrition ([facility]) E43    Chest pain R07.9    Chronic respiratory failure with hypoxia ([facility]) J96.11    Essential hypertension I10    Anemia D64.9    Acute respiratory failure ([facility]) J96.00    Acute respiratory distress R06.03    Volume overload E87.70    Demand ischemia ([facility]) I24.8    GERD (gastroesophageal reflux disease) K21.9    Acute respiratory failure with hypoxia and hypercapnia ([facility]) J96.01, J96.02    Shock ([facility]) R57.9    Pneumonia due to infectious organism J18.9    Acute on chronic respiratory failure with hypoxia and hypercapnia ([facility]) J96.21, J96.22    Chronic obstructive pulmonary disease ([facility]) J44.9    Acute on chronic respiratory failure with hypoxemia ([facility]) J96.21    Chronic anxiety F41.9    Congestive heart failure ([facility]) I50.9    Acute respiratory failure with hypoxia and hypercarbia ([facility]) J96.01, J96.02    Acute kidney injury ([facility]) N17.9    Elevated procalcitonin R79.89       Isolation/Infection:   Isolation          No Isolation        Patient Infection Status     Infection Onset Added Last Indicated Last Indicated By Review Planned Expiration Resolved Resolved By    None active    Resolved    COVID-19 Rule Out 09/28/21 09/28/21 09/28/21 COVID-19 & Influenza Combo (Ordered)   09/28/21 Rule-Out Test Resulted    COVID-19 Rule Out 09/27/21 09/27/21 09/27/21 COVID-19, Rapid (Ordered)   09/27/21 Rule-Out Test Resulted    COVID-19 Rule Out 09/23/21 09/23/21 09/23/21 COVID-19, Rapid (Ordered)   09/23/21 Rule-Out Test Resulted    COVID-19 Rule Out 09/16/21 09/16/21 09/16/21 COVID-19 & Influenza Combo (Ordered)   09/16/21 Rule-Out Test Resulted    COVID-19 Rule Out 09/09/21 09/09/21 09/09/21 COVID-19 (Ordered)   09/10/21 Ignacia Beck RN    COVID-19 Rule Out 09/09/21 09/09/21 09/09/21 COVID-19, Rapid (Ordered)   09/09/21 Rule-Out Test Resulted    COVID-19 Rule Out 09/09/21 09/09/21 09/09/21 SARS-CoV-2 NAAT (Rapid) (Ordered)   09/09/21 Rule-Out Test Resulted    COVID-19 Rule Out 08/30/21 08/30/21 08/30/21 COVID-19 (Ordered)   08/30/21 Rule-Out Test Resulted    COVID-19 Rule Out 08/29/21 08/29/21 08/29/21 COVID-19, Rapid (Ordered)   08/29/21 Rule-Out Test Resulted    COVID-19 Rule Out 06/01/21 06/01/21 06/01/21 COVID-19 & Influenza Combo (Ordered)   06/01/21 Rule-Out Test Resulted    MRSA 09/22/19 09/25/19 09/22/19 Respiratory Culture   06/01/21 Raymon Khan, RN          Nurse Assessment:  Last Vital Signs: /71   Pulse 85   Temp 97.6 °F (36.4 °C) (Axillary)   Resp 29   Ht 5' 2\" (1.575 m)   Wt 107 lb (48.5 kg)   SpO2 100%   BMI 19.57 kg/m²     Last documented pain score (0-10 scale):    Last Weight:   Wt Readings from Last 1 Encounters:   21 107 lb (48.5 kg)     Mental Status:  {IP PT MENTAL STATUS:27989}    IV Access:  { LUPE IV ACCESS:326038784}    Nursing Mobility/ADLs:  Walking   {CHP DME JAMQ:577272796}  Transfer  {CHP DME XYNX:084508134}  Bathing  {CHP DME WESB:707891272}  Dressing  {CHP DME EIKY:627985227}  Toileting  {CHP DME CJRB:423412018}  Feeding  {CHP DME TLUI:763619844}  Med Admin  {CHP DME VMAI:357292151}  Med Delivery   { LUPE MED Delivery:078702001}    Wound Care Documentation and Therapy:        Elimination:  Continence:   · Bowel: {YES / HA:99870}  · Bladder: {YES / CQ:73553}  Urinary Catheter: {Urinary Catheter:682332212}   Colostomy/Ileostomy/Ileal Conduit: {YES / NM:84862}       Date of Last BM: ***  No intake or output data in the 24 hours ending 21 1333  No intake/output data recorded.     Safety Concerns:     [address] [employer] Safety Concerns:233306034}    Impairments/Disabilities:      [address] [employer] [place] Impairments/Disabilities:702158808}    Nutrition Therapy:  Current Nutrition Therapy:   [address] [employer] Diet List:163005132}    Routes of Feeding: {CHP DME Other Feedings:007424772}  Liquids: {Slp liquid thickness:55191}  Daily Fluid Restriction: {CHP DME Yes amt example:102772659}  Last Modified Barium Swallow with Video (Video Swallowing Test): {Done Not Done VSBJ:513791894}    Treatments at the Time of Hospital Discharge:   Respiratory Treatments: ***  Oxygen Therapy:  {Therapy; copd oxygen:34315}  Ventilator:    {[facility] Vent VUDH:654096155}    Rehab Therapies: {THERAPEUTIC INTERVENTION:9849053625}  Weight Bearing Status/Restrictions: [address] Weight Bearin}  Other Medical Equipment (for information only, NOT a DME order):  {EQUIPMENT:875039055}  Other Treatments: ***    Patient's personal belongings (please select all that are sent with patient):  {[facility] DME Belongings:398408722}    RN SIGNATURE:  {Esignature:113143027}    CASE MANAGEMENT/SOCIAL WORK SECTION    Inpatient Status Date:21  Readmission Risk Assessment Score:  Readmission Risk              Risk of Unplanned Readmission:  33           Discharging to Facility/ Agency   · Name: [facility]  · Address: [place] [address] [address] [facility]   ·                [address] [place] [address], [address]  ·     / signature: Electronically signed by Carol Swain RN on 9/28/21 at 1:45 PM EDT    PHYSICIAN SECTION    Prognosis: Fair    Condition at Discharge: Stable    Rehab Potential (if transferring to Rehab): Fair    Recommended Labs or Other Treatments After Discharge:     Physician Certification: I certify the above information and transfer of Amadou Patel  is necessary for the continuing treatment of the diagnosis listed and that she requires [facility] for lesser 30 days.      Update Admission H&P: No change in H&P    PHYSICIAN SIGNATURE:  Electronically signed by Viviane Calhoun MD on 9/28/21 at 1:34 PM EDT

## 2021-09-29 NOTE — PROGRESS NOTES
Physician Progress Note      PATIENT:               Saeed Salazar  [facility] #:                  [id number]  :                       1940  ADMIT DATE:       2021 4:12 PM  [address] [place] DATE:        2021 3:02 PM  RESPONDING  PROVIDER #:        Micha Etienne MD          QUERY TEXT:    Patient admitted with Acute respiratory failure. Documentation reflects Sepsis   in the h/p and then dropped from documentation. If possible, please document   in the progress notes and discharge summary if Sepsis was: The medical record reflects the following:  Risk Factors: advanced age, pneumonia, copd, CM  Clinical Indicators: wbc-18.4, procal-0.81, acute respiratory failure,   tachycardia, lactic-3.2  Treatment: PCT, lactic, 500 cc IV bolus, Tylenol, Cefipime, Vancomycin    Thank you for your assistance,  Kayleen Akbar RN,BSN,CCDS,CRCR  Options provided:  -- Sepsis treated and resolved  -- Sepsis ruled out after study  -- Other - I will add my own diagnosis  -- Disagree - Not applicable / Not valid  -- Disagree - Clinically unable to determine / Unknown  -- Refer to Clinical Documentation Reviewer    PROVIDER RESPONSE TEXT:    Sepsis treated and resolved. Query created by: Stevie Gee on 2021 10:52 AM      QUERY TEXT:    Patient admitted with Acute respiratory failure. Noted documentation of   pneumonia, completed 10 day course. CTA with no pna. Please further clarify:     The medical record reflects the following:  Risk Factors: advanced age, acute respiratory failure, copd  Clinical Indicators: healthcare associated pna, presumable gram negative,   completed 10 day course, CTA chest last night with no pna  Treatment: CTA, oxygen, Cefipime and Vancomycin    Thank you for your assistance,  Kayleen Akbar RN,BSN,CCDS,CRCR  Options provided:  -- Gram negative pneumonia was treated this admission  -- Previous Pneumonia resolved and not treated this admission  -- Other - I will add my own diagnosis  -- Disagree - Not applicable / Not valid  -- Disagree - Clinically unable to determine / Unknown  -- Refer to Clinical Documentation Reviewer    PROVIDER RESPONSE TEXT:    Gram negative pneumonia was treated this admission. Query created by: Daniel Nation on 9/28/2021 11:01 AM      QUERY TEXT:    Pt admitted with Acute respiratory failure. Noted documentation of severe   malnutrition per dietary consultant on progress note dated 9/25. If possible,   please document in progress notes and discharge summary:    The medical record reflects the following:  Risk Factors:  Inadequate oral intake related to inadequate protein-energy   intake, impaired respiratory function, increase demand for energy/nutrients,   biting/chewing (masticatory) difficulty, catabolic illness  Clinical Indicators: evidenced by poor intake prior to admission, intake   26-50%, wounds, BMI, lab values, other (comment), mild loss of subcutaneous   fat, poor dentition, localized or generalized fluid accumulation, severe   muscle loss (pt is a readmit + 3 hopsital admission this month alone; end   stage COPD; ill fitting dentures)  Treatment: regular diet, add ensure enlive with all meals + HS snack,   monitoring    Thank you for your assistance,  Kayleen Akbar RN,BSN,CCDS,CRCR  Options provided:  -- Severe malnutrition confirmed present on admission  -- Severe malnutrition confirmed not present on admission  -- Severe malnutrition ruled out  -- Other - I will add my own diagnosis  -- Disagree - Not applicable / Not valid  -- Disagree - Clinically unable to determine / Unknown  -- Refer to Clinical Documentation Reviewer    PROVIDER RESPONSE TEXT:    The diagnosis of severe malnutrition was confirmed as present on admission.     Query created by: Daniel Nation on 9/28/2021 11:07 AM      Electronically signed by:  Freeman Rai MD 9/29/2021 5:16 AM -c/w home mirtazapine, lexapro  -c/w home respiradone for psychosis   -c/w home xanax PRN for anxiety     #seizure disorder  -c/w home vimpat and keppra

## 2021-10-02 LAB
BLOOD CULTURE, ROUTINE: NORMAL
CULTURE, BLOOD 2: NORMAL

## 2021-10-12 ENCOUNTER — HOSPITAL ENCOUNTER (INPATIENT)
Age: 81
LOS: 2 days | Discharge: NURSING, REHAB OR LONG-TERM CARE | DRG: 208 | End: 2021-10-14
Attending: STUDENT IN AN ORGANIZED HEALTH CARE EDUCATION/TRAINING PROGRAM | Admitting: INTERNAL MEDICINE
Payer: MEDICARE

## 2021-10-12 ENCOUNTER — APPOINTMENT (OUTPATIENT)
Dept: GENERAL RADIOLOGY | Age: 81
DRG: 208 | End: 2021-10-12
Payer: MEDICARE

## 2021-10-12 ENCOUNTER — APPOINTMENT (OUTPATIENT)
Dept: CT IMAGING | Age: 81
DRG: 208 | End: 2021-10-12
Payer: MEDICARE

## 2021-10-12 DIAGNOSIS — J96.01 ACUTE RESPIRATORY FAILURE WITH HYPOXIA AND HYPERCAPNIA (HCC): Primary | ICD-10-CM

## 2021-10-12 DIAGNOSIS — J44.1 COPD EXACERBATION (HCC): ICD-10-CM

## 2021-10-12 DIAGNOSIS — J96.02 ACUTE RESPIRATORY FAILURE WITH HYPOXIA AND HYPERCAPNIA (HCC): Primary | ICD-10-CM

## 2021-10-12 LAB
A/G RATIO: 1.2 (ref 1.1–2.2)
ALBUMIN SERPL-MCNC: 3.6 G/DL (ref 3.4–5)
ALP BLD-CCNC: 132 U/L (ref 40–129)
ALT SERPL-CCNC: 39 U/L (ref 10–40)
ANION GAP SERPL CALCULATED.3IONS-SCNC: 14 MMOL/L (ref 3–16)
AST SERPL-CCNC: 77 U/L (ref 15–37)
BACTERIA: ABNORMAL /HPF
BASE EXCESS ARTERIAL: -0.6 MMOL/L (ref -3–3)
BASE EXCESS ARTERIAL: -1.3 MMOL/L (ref -3–3)
BASE EXCESS VENOUS: -5.1 MMOL/L (ref -3–3)
BASE EXCESS VENOUS: 1 MMOL/L (ref -3–3)
BASOPHILS ABSOLUTE: 0 K/UL (ref 0–0.2)
BASOPHILS RELATIVE PERCENT: 0 %
BILIRUB SERPL-MCNC: 0.3 MG/DL (ref 0–1)
BILIRUBIN URINE: NEGATIVE
BLOOD, URINE: ABNORMAL
BUN BLDV-MCNC: 16 MG/DL (ref 7–20)
CALCIUM SERPL-MCNC: 8.7 MG/DL (ref 8.3–10.6)
CARBOXYHEMOGLOBIN ARTERIAL: 0.3 % (ref 0–1.5)
CARBOXYHEMOGLOBIN ARTERIAL: 0.3 % (ref 0–1.5)
CARBOXYHEMOGLOBIN: 1 % (ref 0–1.5)
CARBOXYHEMOGLOBIN: 1.4 % (ref 0–1.5)
CHLORIDE BLD-SCNC: 101 MMOL/L (ref 99–110)
CLARITY: CLEAR
CO2: 26 MMOL/L (ref 21–32)
COLOR: YELLOW
CREAT SERPL-MCNC: 1.1 MG/DL (ref 0.6–1.2)
EKG ATRIAL RATE: 112 BPM
EKG DIAGNOSIS: NORMAL
EKG P AXIS: 71 DEGREES
EKG P-R INTERVAL: 130 MS
EKG Q-T INTERVAL: 328 MS
EKG QRS DURATION: 90 MS
EKG QTC CALCULATION (BAZETT): 447 MS
EKG R AXIS: -11 DEGREES
EKG T AXIS: 78 DEGREES
EKG VENTRICULAR RATE: 112 BPM
EOSINOPHILS ABSOLUTE: 0.3 K/UL (ref 0–0.6)
EOSINOPHILS RELATIVE PERCENT: 2 %
EPITHELIAL CELLS, UA: ABNORMAL /HPF (ref 0–5)
GFR AFRICAN AMERICAN: 58
GFR NON-AFRICAN AMERICAN: 48
GLOBULIN: 3 G/DL
GLUCOSE BLD-MCNC: 106 MG/DL (ref 70–99)
GLUCOSE BLD-MCNC: 108 MG/DL (ref 70–99)
GLUCOSE BLD-MCNC: 124 MG/DL (ref 70–99)
GLUCOSE BLD-MCNC: 256 MG/DL (ref 70–99)
GLUCOSE BLD-MCNC: 67 MG/DL (ref 70–99)
GLUCOSE BLD-MCNC: 90 MG/DL (ref 70–99)
GLUCOSE URINE: NEGATIVE MG/DL
HCO3 ARTERIAL: 26 MMOL/L (ref 21–29)
HCO3 ARTERIAL: 28.2 MMOL/L (ref 21–29)
HCO3 VENOUS: 27.6 MMOL/L (ref 23–29)
HCO3 VENOUS: 27.6 MMOL/L (ref 23–29)
HCT VFR BLD CALC: 34.7 % (ref 36–48)
HEMOGLOBIN, ART, EXTENDED: 11.4 G/DL (ref 12–16)
HEMOGLOBIN, ART, EXTENDED: 9.2 G/DL (ref 12–16)
HEMOGLOBIN: 10.5 G/DL (ref 12–16)
INFLUENZA A: NOT DETECTED
INFLUENZA B: NOT DETECTED
KETONES, URINE: NEGATIVE MG/DL
LACTIC ACID, SEPSIS: 2.3 MMOL/L (ref 0.4–1.9)
LACTIC ACID, SEPSIS: 5.4 MMOL/L (ref 0.4–1.9)
LEUKOCYTE ESTERASE, URINE: ABNORMAL
LYMPHOCYTES ABSOLUTE: 5.9 K/UL (ref 1–5.1)
LYMPHOCYTES RELATIVE PERCENT: 39 %
MCH RBC QN AUTO: 28.7 PG (ref 26–34)
MCHC RBC AUTO-ENTMCNC: 30.3 G/DL (ref 31–36)
MCV RBC AUTO: 94.7 FL (ref 80–100)
METHEMOGLOBIN ARTERIAL: 0.2 %
METHEMOGLOBIN ARTERIAL: 0.2 %
METHEMOGLOBIN VENOUS: 0 %
METHEMOGLOBIN VENOUS: 0.3 %
MICROSCOPIC EXAMINATION: YES
MONOCYTES ABSOLUTE: 0.9 K/UL (ref 0–1.3)
MONOCYTES RELATIVE PERCENT: 6 %
NEUTROPHILS ABSOLUTE: 8 K/UL (ref 1.7–7.7)
NEUTROPHILS RELATIVE PERCENT: 53 %
NITRITE, URINE: NEGATIVE
O2 CONTENT, VEN: 9 VOL %
O2 SAT, ARTERIAL: 96.7 %
O2 SAT, ARTERIAL: 96.7 %
O2 SAT, VEN: 65 %
O2 SAT, VEN: 98 %
O2 THERAPY: ABNORMAL
PCO2 ARTERIAL: 52.9 MMHG (ref 35–45)
PCO2 ARTERIAL: 74.4 MMHG (ref 35–45)
PCO2, VEN: 105.7 MMHG (ref 40–50)
PCO2, VEN: 53.9 MMHG (ref 40–50)
PDW BLD-RTO: 21.4 % (ref 12.4–15.4)
PERFORMED ON: ABNORMAL
PERFORMED ON: NORMAL
PH ARTERIAL: 7.2 (ref 7.35–7.45)
PH ARTERIAL: 7.31 (ref 7.35–7.45)
PH UA: 6.5 (ref 5–8)
PH VENOUS: 7.04 (ref 7.35–7.45)
PH VENOUS: 7.33 (ref 7.35–7.45)
PLATELET # BLD: 278 K/UL (ref 135–450)
PMV BLD AUTO: 8.6 FL (ref 5–10.5)
PO2 ARTERIAL: 109.8 MMHG (ref 75–108)
PO2 ARTERIAL: 96.5 MMHG (ref 75–108)
PO2, VEN: 157.8 MMHG (ref 25–40)
PO2, VEN: 36.5 MMHG (ref 25–40)
POTASSIUM REFLEX MAGNESIUM: 4.7 MMOL/L (ref 3.5–5.1)
PRO-BNP: 5248 PG/ML (ref 0–449)
PROCALCITONIN: 0.18 NG/ML (ref 0–0.15)
PROTEIN UA: 100 MG/DL
RBC # BLD: 3.66 M/UL (ref 4–5.2)
RBC UA: ABNORMAL /HPF (ref 0–4)
SARS-COV-2 RNA, RT PCR: NOT DETECTED
SODIUM BLD-SCNC: 141 MMOL/L (ref 136–145)
SPECIFIC GRAVITY UA: 1.02 (ref 1–1.03)
TCO2 ARTERIAL: 27.7 MMOL/L
TCO2 ARTERIAL: 30.4 MMOL/L
TCO2 CALC VENOUS: 29 MMOL/L
TCO2 CALC VENOUS: 31 MMOL/L
TOTAL PROTEIN: 6.6 G/DL (ref 6.4–8.2)
TROPONIN: 0.06 NG/ML
URINE TYPE: ABNORMAL
UROBILINOGEN, URINE: 0.2 E.U./DL
WBC # BLD: 15.1 K/UL (ref 4–11)
WBC UA: ABNORMAL /HPF (ref 0–5)
YEAST: PRESENT /HPF

## 2021-10-12 PROCEDURE — 80053 COMPREHEN METABOLIC PANEL: CPT

## 2021-10-12 PROCEDURE — 6360000002 HC RX W HCPCS: Performed by: INTERNAL MEDICINE

## 2021-10-12 PROCEDURE — 84484 ASSAY OF TROPONIN QUANT: CPT

## 2021-10-12 PROCEDURE — 83605 ASSAY OF LACTIC ACID: CPT

## 2021-10-12 PROCEDURE — 93010 ELECTROCARDIOGRAM REPORT: CPT | Performed by: INTERNAL MEDICINE

## 2021-10-12 PROCEDURE — 99285 EMERGENCY DEPT VISIT HI MDM: CPT

## 2021-10-12 PROCEDURE — 99291 CRITICAL CARE FIRST HOUR: CPT | Performed by: INTERNAL MEDICINE

## 2021-10-12 PROCEDURE — 6370000000 HC RX 637 (ALT 250 FOR IP): Performed by: STUDENT IN AN ORGANIZED HEALTH CARE EDUCATION/TRAINING PROGRAM

## 2021-10-12 PROCEDURE — 94660 CPAP INITIATION&MGMT: CPT

## 2021-10-12 PROCEDURE — 51702 INSERT TEMP BLADDER CATH: CPT

## 2021-10-12 PROCEDURE — 96368 THER/DIAG CONCURRENT INF: CPT

## 2021-10-12 PROCEDURE — 2580000003 HC RX 258: Performed by: INTERNAL MEDICINE

## 2021-10-12 PROCEDURE — 5A1935Z RESPIRATORY VENTILATION, LESS THAN 24 CONSECUTIVE HOURS: ICD-10-PCS | Performed by: STUDENT IN AN ORGANIZED HEALTH CARE EDUCATION/TRAINING PROGRAM

## 2021-10-12 PROCEDURE — 94761 N-INVAS EAR/PLS OXIMETRY MLT: CPT

## 2021-10-12 PROCEDURE — 93005 ELECTROCARDIOGRAM TRACING: CPT | Performed by: STUDENT IN AN ORGANIZED HEALTH CARE EDUCATION/TRAINING PROGRAM

## 2021-10-12 PROCEDURE — 36415 COLL VENOUS BLD VENIPUNCTURE: CPT

## 2021-10-12 PROCEDURE — 6360000002 HC RX W HCPCS: Performed by: STUDENT IN AN ORGANIZED HEALTH CARE EDUCATION/TRAINING PROGRAM

## 2021-10-12 PROCEDURE — 85025 COMPLETE CBC W/AUTO DIFF WBC: CPT

## 2021-10-12 PROCEDURE — 96365 THER/PROPH/DIAG IV INF INIT: CPT

## 2021-10-12 PROCEDURE — 83880 ASSAY OF NATRIURETIC PEPTIDE: CPT

## 2021-10-12 PROCEDURE — 84145 PROCALCITONIN (PCT): CPT

## 2021-10-12 PROCEDURE — 87040 BLOOD CULTURE FOR BACTERIA: CPT

## 2021-10-12 PROCEDURE — 87077 CULTURE AEROBIC IDENTIFY: CPT

## 2021-10-12 PROCEDURE — 94002 VENT MGMT INPAT INIT DAY: CPT

## 2021-10-12 PROCEDURE — 94640 AIRWAY INHALATION TREATMENT: CPT

## 2021-10-12 PROCEDURE — 6370000000 HC RX 637 (ALT 250 FOR IP): Performed by: INTERNAL MEDICINE

## 2021-10-12 PROCEDURE — 71045 X-RAY EXAM CHEST 1 VIEW: CPT

## 2021-10-12 PROCEDURE — 99223 1ST HOSP IP/OBS HIGH 75: CPT | Performed by: INTERNAL MEDICINE

## 2021-10-12 PROCEDURE — 87086 URINE CULTURE/COLONY COUNT: CPT

## 2021-10-12 PROCEDURE — 2580000003 HC RX 258: Performed by: STUDENT IN AN ORGANIZED HEALTH CARE EDUCATION/TRAINING PROGRAM

## 2021-10-12 PROCEDURE — 6360000002 HC RX W HCPCS

## 2021-10-12 PROCEDURE — 6360000004 HC RX CONTRAST MEDICATION: Performed by: STUDENT IN AN ORGANIZED HEALTH CARE EDUCATION/TRAINING PROGRAM

## 2021-10-12 PROCEDURE — 2500000003 HC RX 250 WO HCPCS: Performed by: STUDENT IN AN ORGANIZED HEALTH CARE EDUCATION/TRAINING PROGRAM

## 2021-10-12 PROCEDURE — 87636 SARSCOV2 & INF A&B AMP PRB: CPT

## 2021-10-12 PROCEDURE — 2000000000 HC ICU R&B

## 2021-10-12 PROCEDURE — 2700000000 HC OXYGEN THERAPY PER DAY

## 2021-10-12 PROCEDURE — 71260 CT THORAX DX C+: CPT

## 2021-10-12 PROCEDURE — 82803 BLOOD GASES ANY COMBINATION: CPT

## 2021-10-12 PROCEDURE — 31500 INSERT EMERGENCY AIRWAY: CPT

## 2021-10-12 PROCEDURE — 96375 TX/PRO/DX INJ NEW DRUG ADDON: CPT

## 2021-10-12 PROCEDURE — 0BH17EZ INSERTION OF ENDOTRACHEAL AIRWAY INTO TRACHEA, VIA NATURAL OR ARTIFICIAL OPENING: ICD-10-PCS | Performed by: STUDENT IN AN ORGANIZED HEALTH CARE EDUCATION/TRAINING PROGRAM

## 2021-10-12 PROCEDURE — 36600 WITHDRAWAL OF ARTERIAL BLOOD: CPT

## 2021-10-12 PROCEDURE — 2500000003 HC RX 250 WO HCPCS

## 2021-10-12 PROCEDURE — 2500000003 HC RX 250 WO HCPCS: Performed by: INTERNAL MEDICINE

## 2021-10-12 PROCEDURE — 81001 URINALYSIS AUTO W/SCOPE: CPT

## 2021-10-12 RX ORDER — KETOROLAC TROMETHAMINE 30 MG/ML
15 INJECTION, SOLUTION INTRAMUSCULAR; INTRAVENOUS ONCE
Status: DISCONTINUED | OUTPATIENT
Start: 2021-10-12 | End: 2021-10-14 | Stop reason: HOSPADM

## 2021-10-12 RX ORDER — ETOMIDATE 2 MG/ML
20 INJECTION INTRAVENOUS ONCE
Status: COMPLETED | OUTPATIENT
Start: 2021-10-12 | End: 2021-10-12

## 2021-10-12 RX ORDER — BUDESONIDE AND FORMOTEROL FUMARATE DIHYDRATE 160; 4.5 UG/1; UG/1
2 AEROSOL RESPIRATORY (INHALATION) 2 TIMES DAILY
Status: DISCONTINUED | OUTPATIENT
Start: 2021-10-12 | End: 2021-10-12

## 2021-10-12 RX ORDER — ATORVASTATIN CALCIUM 10 MG/1
20 TABLET, FILM COATED ORAL NIGHTLY
Status: DISCONTINUED | OUTPATIENT
Start: 2021-10-12 | End: 2021-10-14 | Stop reason: HOSPADM

## 2021-10-12 RX ORDER — POLYETHYLENE GLYCOL 3350 17 G/17G
17 POWDER, FOR SOLUTION ORAL DAILY PRN
Status: DISCONTINUED | OUTPATIENT
Start: 2021-10-12 | End: 2021-10-14 | Stop reason: HOSPADM

## 2021-10-12 RX ORDER — IPRATROPIUM BROMIDE AND ALBUTEROL SULFATE 2.5; .5 MG/3ML; MG/3ML
3 SOLUTION RESPIRATORY (INHALATION) EVERY 6 HOURS
Status: DISCONTINUED | OUTPATIENT
Start: 2021-10-12 | End: 2021-10-12

## 2021-10-12 RX ORDER — FENTANYL CITRATE 50 UG/ML
25 INJECTION, SOLUTION INTRAMUSCULAR; INTRAVENOUS
Status: DISCONTINUED | OUTPATIENT
Start: 2021-10-12 | End: 2021-10-13

## 2021-10-12 RX ORDER — PROPOFOL 10 MG/ML
INJECTION, EMULSION INTRAVENOUS
Status: COMPLETED
Start: 2021-10-12 | End: 2021-10-12

## 2021-10-12 RX ORDER — IPRATROPIUM BROMIDE AND ALBUTEROL SULFATE 2.5; .5 MG/3ML; MG/3ML
3 SOLUTION RESPIRATORY (INHALATION) 4 TIMES DAILY
Status: DISCONTINUED | OUTPATIENT
Start: 2021-10-12 | End: 2021-10-14 | Stop reason: HOSPADM

## 2021-10-12 RX ORDER — CLOPIDOGREL BISULFATE 75 MG/1
75 TABLET ORAL DAILY
Status: DISCONTINUED | OUTPATIENT
Start: 2021-10-12 | End: 2021-10-14 | Stop reason: HOSPADM

## 2021-10-12 RX ORDER — NICOTINE POLACRILEX 4 MG
15 LOZENGE BUCCAL PRN
Status: DISCONTINUED | OUTPATIENT
Start: 2021-10-12 | End: 2021-10-14 | Stop reason: HOSPADM

## 2021-10-12 RX ORDER — PANTOPRAZOLE SODIUM 20 MG/1
20 TABLET, DELAYED RELEASE ORAL DAILY
Status: DISCONTINUED | OUTPATIENT
Start: 2021-10-12 | End: 2021-10-14 | Stop reason: HOSPADM

## 2021-10-12 RX ORDER — DEXTROSE MONOHYDRATE 50 MG/ML
100 INJECTION, SOLUTION INTRAVENOUS PRN
Status: DISCONTINUED | OUTPATIENT
Start: 2021-10-12 | End: 2021-10-14 | Stop reason: HOSPADM

## 2021-10-12 RX ORDER — DEXTROSE MONOHYDRATE 25 G/50ML
12.5 INJECTION, SOLUTION INTRAVENOUS PRN
Status: DISCONTINUED | OUTPATIENT
Start: 2021-10-12 | End: 2021-10-14 | Stop reason: HOSPADM

## 2021-10-12 RX ORDER — PREDNISONE 20 MG/1
40 TABLET ORAL DAILY
Status: DISCONTINUED | OUTPATIENT
Start: 2021-10-15 | End: 2021-10-13

## 2021-10-12 RX ORDER — SODIUM CHLORIDE 0.9 % (FLUSH) 0.9 %
5-40 SYRINGE (ML) INJECTION PRN
Status: DISCONTINUED | OUTPATIENT
Start: 2021-10-12 | End: 2021-10-14 | Stop reason: HOSPADM

## 2021-10-12 RX ORDER — ONDANSETRON 4 MG/1
4 TABLET, ORALLY DISINTEGRATING ORAL EVERY 8 HOURS PRN
Status: DISCONTINUED | OUTPATIENT
Start: 2021-10-12 | End: 2021-10-14 | Stop reason: HOSPADM

## 2021-10-12 RX ORDER — 0.9 % SODIUM CHLORIDE 0.9 %
1000 INTRAVENOUS SOLUTION INTRAVENOUS ONCE
Status: COMPLETED | OUTPATIENT
Start: 2021-10-12 | End: 2021-10-12

## 2021-10-12 RX ORDER — IPRATROPIUM BROMIDE AND ALBUTEROL SULFATE 2.5; .5 MG/3ML; MG/3ML
1 SOLUTION RESPIRATORY (INHALATION) ONCE
Status: COMPLETED | OUTPATIENT
Start: 2021-10-12 | End: 2021-10-12

## 2021-10-12 RX ORDER — MIDAZOLAM HYDROCHLORIDE 1 MG/ML
2 INJECTION INTRAMUSCULAR; INTRAVENOUS
Status: DISCONTINUED | OUTPATIENT
Start: 2021-10-12 | End: 2021-10-13

## 2021-10-12 RX ORDER — PROPOFOL 10 MG/ML
5-50 INJECTION, EMULSION INTRAVENOUS
Status: DISCONTINUED | OUTPATIENT
Start: 2021-10-12 | End: 2021-10-12

## 2021-10-12 RX ORDER — ACETAMINOPHEN 650 MG/1
650 SUPPOSITORY RECTAL EVERY 6 HOURS PRN
Status: DISCONTINUED | OUTPATIENT
Start: 2021-10-12 | End: 2021-10-14 | Stop reason: HOSPADM

## 2021-10-12 RX ORDER — SODIUM CHLORIDE 0.9 % (FLUSH) 0.9 %
5-40 SYRINGE (ML) INJECTION EVERY 12 HOURS SCHEDULED
Status: DISCONTINUED | OUTPATIENT
Start: 2021-10-12 | End: 2021-10-14 | Stop reason: HOSPADM

## 2021-10-12 RX ORDER — DEXTROSE AND SODIUM CHLORIDE 5; .9 G/100ML; G/100ML
INJECTION, SOLUTION INTRAVENOUS CONTINUOUS
Status: DISCONTINUED | OUTPATIENT
Start: 2021-10-12 | End: 2021-10-13

## 2021-10-12 RX ORDER — SODIUM CHLORIDE 9 MG/ML
25 INJECTION, SOLUTION INTRAVENOUS PRN
Status: DISCONTINUED | OUTPATIENT
Start: 2021-10-12 | End: 2021-10-14 | Stop reason: HOSPADM

## 2021-10-12 RX ORDER — ACETAMINOPHEN 325 MG/1
650 TABLET ORAL EVERY 6 HOURS PRN
Status: DISCONTINUED | OUTPATIENT
Start: 2021-10-12 | End: 2021-10-14 | Stop reason: HOSPADM

## 2021-10-12 RX ORDER — SODIUM CHLORIDE 9 MG/ML
1000 INJECTION, SOLUTION INTRAVENOUS CONTINUOUS
Status: DISCONTINUED | OUTPATIENT
Start: 2021-10-12 | End: 2021-10-12

## 2021-10-12 RX ORDER — POLYETHYLENE GLYCOL 3350 17 G/17G
17 POWDER, FOR SOLUTION ORAL 2 TIMES DAILY
Status: DISCONTINUED | OUTPATIENT
Start: 2021-10-12 | End: 2021-10-14 | Stop reason: HOSPADM

## 2021-10-12 RX ORDER — METHYLPREDNISOLONE SODIUM SUCCINATE 40 MG/ML
40 INJECTION, POWDER, LYOPHILIZED, FOR SOLUTION INTRAMUSCULAR; INTRAVENOUS DAILY
Status: DISCONTINUED | OUTPATIENT
Start: 2021-10-13 | End: 2021-10-13

## 2021-10-12 RX ORDER — ROCURONIUM BROMIDE 10 MG/ML
80 INJECTION, SOLUTION INTRAVENOUS ONCE
Status: COMPLETED | OUTPATIENT
Start: 2021-10-12 | End: 2021-10-12

## 2021-10-12 RX ORDER — ONDANSETRON 2 MG/ML
4 INJECTION INTRAMUSCULAR; INTRAVENOUS EVERY 6 HOURS PRN
Status: DISCONTINUED | OUTPATIENT
Start: 2021-10-12 | End: 2021-10-14 | Stop reason: HOSPADM

## 2021-10-12 RX ORDER — DEXAMETHASONE SODIUM PHOSPHATE 10 MG/ML
10 INJECTION INTRAMUSCULAR; INTRAVENOUS ONCE
Status: COMPLETED | OUTPATIENT
Start: 2021-10-12 | End: 2021-10-12

## 2021-10-12 RX ADMIN — DEXTROSE MONOHYDRATE 12.5 G: 25 INJECTION, SOLUTION INTRAVENOUS at 20:14

## 2021-10-12 RX ADMIN — MUPIROCIN: 20 OINTMENT TOPICAL at 20:21

## 2021-10-12 RX ADMIN — ETOMIDATE 20 MG: 2 INJECTION INTRAVENOUS at 02:41

## 2021-10-12 RX ADMIN — IPRATROPIUM BROMIDE AND ALBUTEROL SULFATE 3 ML: 2.5; .5 SOLUTION RESPIRATORY (INHALATION) at 19:02

## 2021-10-12 RX ADMIN — MIDAZOLAM HYDROCHLORIDE 2 MG: 1 INJECTION, SOLUTION INTRAMUSCULAR; INTRAVENOUS at 14:09

## 2021-10-12 RX ADMIN — SODIUM CHLORIDE 1000 ML: 9 INJECTION, SOLUTION INTRAVENOUS at 06:45

## 2021-10-12 RX ADMIN — MIDAZOLAM HYDROCHLORIDE 1 MG/HR: 5 INJECTION, SOLUTION INTRAMUSCULAR; INTRAVENOUS at 06:25

## 2021-10-12 RX ADMIN — METOPROLOL TARTRATE 25 MG: 25 TABLET, FILM COATED ORAL at 20:29

## 2021-10-12 RX ADMIN — FENTANYL CITRATE 25 MCG: 0.05 INJECTION, SOLUTION INTRAMUSCULAR; INTRAVENOUS at 14:09

## 2021-10-12 RX ADMIN — PROPOFOL 10 MCG/KG/MIN: 10 INJECTION, EMULSION INTRAVENOUS at 02:53

## 2021-10-12 RX ADMIN — IOPAMIDOL 85 ML: 755 INJECTION, SOLUTION INTRAVENOUS at 04:36

## 2021-10-12 RX ADMIN — DEXTROSE MONOHYDRATE 500 MG: 50 INJECTION, SOLUTION INTRAVENOUS at 04:13

## 2021-10-12 RX ADMIN — ATORVASTATIN CALCIUM 20 MG: 10 TABLET, FILM COATED ORAL at 20:29

## 2021-10-12 RX ADMIN — ROCURONIUM BROMIDE 80 MG: 10 INJECTION, SOLUTION INTRAVENOUS at 02:41

## 2021-10-12 RX ADMIN — SODIUM CHLORIDE, PRESERVATIVE FREE 10 ML: 5 INJECTION INTRAVENOUS at 20:14

## 2021-10-12 RX ADMIN — SODIUM CHLORIDE 1000 ML: 9 INJECTION, SOLUTION INTRAVENOUS at 03:30

## 2021-10-12 RX ADMIN — SODIUM CHLORIDE 1000 ML: 9 INJECTION, SOLUTION INTRAVENOUS at 05:30

## 2021-10-12 RX ADMIN — DEXTROSE AND SODIUM CHLORIDE: 5; 900 INJECTION, SOLUTION INTRAVENOUS at 20:28

## 2021-10-12 RX ADMIN — ENOXAPARIN SODIUM 40 MG: 40 INJECTION SUBCUTANEOUS at 11:49

## 2021-10-12 RX ADMIN — DEXAMETHASONE SODIUM PHOSPHATE 10 MG: 10 INJECTION INTRAMUSCULAR; INTRAVENOUS at 03:03

## 2021-10-12 RX ADMIN — IPRATROPIUM BROMIDE AND ALBUTEROL SULFATE 1 AMPULE: 2.5; .5 SOLUTION RESPIRATORY (INHALATION) at 03:09

## 2021-10-12 RX ADMIN — CEFTRIAXONE SODIUM 1000 MG: 1 INJECTION, POWDER, FOR SOLUTION INTRAMUSCULAR; INTRAVENOUS at 03:07

## 2021-10-12 ASSESSMENT — PAIN SCALES - GENERAL
PAINLEVEL_OUTOF10: 0

## 2021-10-12 ASSESSMENT — PULMONARY FUNCTION TESTS: PIF_VALUE: 11

## 2021-10-12 NOTE — ED NOTES
Pt from [address] by ems.,  Per ems pt called out for difficulty breathing. Pt was given breathing treatment  And ativan for pt being anxious. Pt arrived to er being bag by ems. Pt unable to assist with triage.      Dawna GRANADOS, RT  In room upon arrival.      Duncan Call RN  10/12/21 9040

## 2021-10-12 NOTE — PROGRESS NOTES
End of shift note. Patient arrived intubated and very awake. Blood gases were drawn and patient was extubated. She is alert/oriented x 4. She is now on bi-pap 30% Fi02. Peripheral lines remain intact with normal saline continuous.  VSS, telemetry SR.

## 2021-10-12 NOTE — PROGRESS NOTES
10/12/21 1905   NIV Type   Mode Bilevel   Mask Type Full face mask   Mask Size Small   Settings/Measurements   IPAP 16 cmH20   CPAP/EPAP 8 cmH2O   Rate Ordered 16   Resp 22   FiO2  30 %   Vt Exhaled 430 mL   Comfort Level Good   SpO2 100

## 2021-10-12 NOTE — ED NOTES
NG tube advised 10cm per Dr. Jose Carrera. NG tube is now 48cm at pt's nose.       Moy Rodriguez RN  10/12/21 [id number]

## 2021-10-12 NOTE — PROGRESS NOTES
Pt placed on SBT of 5/5 at this time. Pt needed a lot of coaching to take breaths. Continuing to monitor pt.

## 2021-10-12 NOTE — PROGRESS NOTES
Patient is awake and following commands. A SBT was performed. An ABG is being drawn and then plans to call Dr. Florencia Lenz.

## 2021-10-12 NOTE — PLAN OF CARE
resp failure on vent  Recurrent hospital admissions x 4 in 1 month   This one likely with ativan- was given ativan at [facility] for anxiety and became obtunded- hypercarbic  Hold off abx

## 2021-10-12 NOTE — ED NOTES
Perfect serve message to Dr. Wild Sheikh @ [address] [place] [address]  10/12/21 4121    Dr Wild Sheikh returned the call to Dr. Felicia Collins @ [address] [place] [address]  10/12/21 0906

## 2021-10-12 NOTE — H&P
Hospital Medicine History & Physical            PCP: Aida Artis MD    Date of Admission: 10/12/2021    Date of Service: Pt seen/examined on 10/12/2021     Chief Complaint:    Chief Complaint   Patient presents with    Respiratory Distress     from [facility]       History Of Present Illness: The patient is a [de-identified] y.o. female with severe end-stage COPD, severe anxiety disorder , malnutrition , CAD, HTN, mood DO, GERD who presented to [facility] ED from nursing home in respiratory distress. Reportedly given ativan at NH for anxiety, subsequently became obtunded and sent to ER for eval.  She was intubated by ER provider and admitted to the ICU. Patient has had multiple recurrent admissions for acute on chronic hypoxic hypercapnic respiratory failure. Recurrent issues with anxiety. She has a trilogy unit in the nursing home. Since admission to the ICU patient has been stable on mechanical ventilation. She is awake and alert now and responding very well. O2 sats stable on FiO2 35%.    Seen by intensivist . plan is to extubate soon.       Past Medical History:        Diagnosis Date    [facility]) [facility]     Asthma     Chronic anxiety     COPD (chronic obstructive pulmonary disease) [facility]     GERD (gastroesophageal reflux disease) 9/9/2021    Hyperlipidemia     Hypertension     MRSA (methicillin resistant staph aureus) culture positive 09/22/2019    + resp cx    OA (osteoarthritis) 8/12/2014       Past Surgical History:        Procedure Laterality Date    BRONCHOSCOPY  09/08/2019    Dr. Atul Juarez - w/BAL   [address]  09/05/2019    Dr. Della Solis N/A 2/24/2020    COLONOSCOPY DIAGNOSTIC performed by Elliot Simmons DO at [address] [place] [address] N/A 9/19/2019    OFF PUMP CORONARY ARTERY BYPASS GRAFTING X2, INTERNAL MAMMARY ARTERY, SAPHENOUS VEIN GRAFT performed by Freddy Herman MD at [facility] CATH  9/20/2021    IR MIDLINE CATH 9/20/2021 [facility] SPECIAL PROCEDURES    MASTECTOMY, PARTIAL Left     SIGMOIDOSCOPY  02/27/2020    4 bands    SIGMOIDOSCOPY N/A 2/27/2020    FLEX SIG W/ BANDING SIGMOIDOSCOPY DIAGNOSTIC FLEXIBLE performed by Zaynab Michel DO at [address]       Medications Prior to Admission:    Prior to Admission medications    Medication Sig Start Date End Date Taking? Authorizing Provider   ondansetron (ZOFRAN-ODT) 4 MG disintegrating tablet Take 1 tablet by mouth every 8 hours as needed for Nausea or Vomiting 9/27/21   Hung Lara MD   predniSONE (DELTASONE) 10 MG tablet Take 4 tabs a day for 5 days ,   then 3 tabs a day for 5 days,  Then 2 tabs a day for 5 days ,  Then 1 tab daily 9/27/21   Hung Lara MD   furosemide (LASIX) 20 MG tablet Take 1 tablet by mouth See Admin Instructions Tuesday, friday 9/27/21   Hung Lara MD   magnesium oxide (MAG-OX) 400 (241.3 Mg) MG TABS tablet Take 1 tablet by mouth daily 9/28/21   Hung Lara MD   potassium & sodium phosphates (PHOS-NAK) 280-160-250 MG PACK Take 1 packet by mouth daily 9/27/21   Hung Lara MD   menthol-zinc oxide (CALMOSEPTINE) 0.44-20.6 % OINT ointment Apply topically 2 times daily as needed (Apply to buttocks until resolved) Max 30 ml per day.  9/14/21   Bret Martínez MD   polyethylene glycol (GLYCOLAX) 17 g packet Take 17 g by mouth 2 times daily 9/14/21   Bret Martínez MD   melatonin 5 MG TBDP disintegrating tablet Take 1 tablet by mouth nightly as needed (sleep) 9/14/21   Bret Martínez MD   fluticasone [facility]) 50 MCG/ACT nasal spray 1 spray by Each Nostril route daily as needed for Rhinitis 9/14/21   Bret Martínez MD   pantoprazole (PROTONIX) 20 MG tablet Take 20 mg by mouth daily    Historical Provider, MD   ferrous sulfate (IRON 325) 325 (65 Fe) MG tablet Take 325 mg by mouth daily (with breakfast)    Historical Provider, MD   Roflumilast (DALIRESP) 250 MCG tablet Take 250 mcg by mouth daily    Historical Provider, MD   acetaminophen (TYLENOL) 500 MG tablet Take 500 mg by mouth every 6 hours as needed for Pain    Historical Provider, MD   sertraline (ZOLOFT) 50 MG tablet Take 50 mg by mouth daily    Historical Provider, MD   budesonide (PULMICORT) 0.5 MG/2ML nebulizer suspension Take 2 mLs by nebulization 2 times daily 9/24/19   Fernanda Harley MD   ipratropium-albuterol (DUONEB) 0.5-2.5 (3) MG/3ML SOLN nebulizer solution Inhale 3 mLs into the lungs every 6 hours 9/24/19   Fernanda Harley MD   atorvastatin (LIPITOR) 20 MG tablet Take 1 tablet by mouth nightly 9/24/19   Fernanda Harley MD   metoprolol tartrate (LOPRESSOR) 25 MG tablet Take 1 tablet by mouth 2 times daily 9/24/19   Fernanda Harley MD   docusate sodium (COLACE, DULCOLAX) 100 MG CAPS Take 100 mg by mouth 2 times daily 9/24/19   Yolanda Hall MD   clopidogrel (PLAVIX) 75 MG tablet Take 1 tablet by mouth daily 9/7/19   Suhas Farias MD   tiotropium (Eliazar Bidding) 18 MCG inhalation capsule Inhale 18 mcg into the lungs daily 7/25/16   Historical Provider, MD   budesonide-formoterol (SYMBICORT) 160-4.5 MCG/ACT AERO Inhale 2 puffs into the lungs 2 times daily    Historical Provider, MD   albuterol (PROVENTIL HFA) 108 (90 BASE) MCG/ACT inhaler Inhale 2 puffs into the lungs every 6 hours as needed for Wheezing or Shortness of Breath. 12/20/12   Lyndsey Maria MD       Allergies:  Latex and Codeine    Social History:  The patient currently lives at [address] [place] [address] [place]:   reports that she quit smoking about 2 years ago. Her smoking use included cigarettes. She has a 25.00 pack-year smoking history. She has never used smokeless tobacco.  ETOH:   reports no history of alcohol use.       Family History:   Positive as follows:        Problem Relation Age of Onset    Cancer Mother     Heart Disease Father     Stroke Father     Heart Disease Sister     Stroke Sister        REVIEW OF SYSTEMS:     Could not be obtained as patient is currently on the ventilator. She is however responding well. Awake and alert on the ventilator. Denies any distress now. Does indicate significant anxiety again. PHYSICAL EXAM:    /72   Pulse 81   Temp 97.9 °F (36.6 °C) (Bladder)   Resp 22   Ht 5' 1\" (1.549 m)   Wt 105 lb (47.6 kg)   SpO2 100%   BMI 19.84 kg/m²     Gen:  Patient is intubated, on mechanical ventilation. Awake and alert and responding appropriately. Anxious. Thin built. Chronically ill-appearing  Eyes: PERRL. No sclera icterus. No conjunctival injection. ENT: No discharge. Pharynx clear. Neck: Trachea midline. Normal thyroid. Resp: No accessory muscle use. Very diminished breath sounds,  no crackles. No wheezes. No rhonchi. No dullness on percussion. CV: Regular rate. Regular rhythm. No murmur or rub. No edema. GI: Non-tender. Non-distended. No masses. No organomegaly. Normal bowel sounds. No hernia. Skin: Warm and dry. No nodule on exposed extremities. No rash on exposed extremities. Lymph: No cervical LAD. No supraclavicular LAD. M/S: No cyanosis. No joint deformity. No clubbing. Intact peripheral pulses. Brisk cap refill, < 2 secs  Neuro: Awake. Reflexes 2+ symmetric bilaterally   Psych: Oriented x 3. + anxiety       CBC:   Recent Labs     10/12/21  0236   WBC 15.1*   HGB 10.5*   HCT 34.7*   MCV 94.7        BMP:   Recent Labs     10/12/21  0236      K 4.7      CO2 26   BUN 16   CREATININE 1.1     LIVER PROFILE:   Recent Labs     10/12/21  0236   AST 77*   ALT 39   BILITOT 0.3   ALKPHOS 132*     PT/INR: No results for input(s): PROTIME, INR in the last 72 hours. APTT: No results for input(s): APTT in the last 72 hours.   UA:  Recent Labs     10/12/21  0405   COLORU Yellow   PHUR 6.5   WBCUA 6-9*   RBCUA 3-4   YEAST Present*   BACTERIA 2+*   CLARITYU Clear   SPECGRAV 1.025   LEUKOCYTESUR TRACE*   UROBILINOGEN 0.2   BILIRUBINUR Negative   BLOODU SMALL*   GLUCOSEU Negative          CARDIAC ENZYMES  Recent Labs     10/12/21  0236   TROPONINI 0.06*       U/A:    Lab Results   Component Value Date    NITRITE neg 05/07/2013    COLORU Yellow 10/12/2021    WBCUA 6-9 10/12/2021    RBCUA 3-4 10/12/2021    BACTERIA 2+ 10/12/2021    CLARITYU Clear 10/12/2021    SPECGRAV 1.025 10/12/2021    LEUKOCYTESUR TRACE 10/12/2021    BLOODU SMALL 10/12/2021    GLUCOSEU Negative 10/12/2021    AMORPHOUS 3+ 09/18/2019       ABG    Lab Results   Component Value Date    KPA4ABS 26.0 10/12/2021    BEART -0.6 10/12/2021    J4YLPQWH 96.7 10/12/2021    PHART 7.310 10/12/2021    THGBART 14.0 12/20/2012    AUD7BAZ 52.9 10/12/2021    PO2ART 96.5 10/12/2021    OBC0NGC 27.7 10/12/2021       CULTURES  Blood: pending  Urine: pending   Sputum: ordered  COVID 19 PCR: not detected     EKG:    Sinus tachycardia  Premature atrial complexes  Nonspecific ST and T wave abnormality  Confirmed by Vanessa Bergeron MD, [facility]    RADIOLOGY    XR CHEST PORTABLE   Final Result   Tip of NG tube is at the GE junction. Consider further advancement by   approximately 8-9 cm      Slight improved aeration of the lungs. Bilateral pleuroparenchymal disease   remains. CT CHEST PULMONARY EMBOLISM W CONTRAST   Final Result   1. Enteric tube distal tip at the GE junction. Advancement is recommended so   that the NG tube is appropriately positioned and side-port/distal tip are   located beyond the GE junction. 2. No clear evidence for pulmonary embolus. 3. Moderate bilateral pleural effusions. Interlobular septal thickening   throughout both lungs with respiratory motion. Compressive atelectasis in   the posterior aspect of both lungs. Cardiomegaly. Left atrial enlargement. Findings can be seen with CHF-related changes. Follow-up is recommended to   document resolution. 4. Adrenal hyperplasia. 5. Atherosclerotic calcification of the aorta and branch vasculature. Coronary artery disease. 6. Prior median sternotomy.          XR CHEST PORTABLE Final Result   1. The endotracheal tube tip is approximately 3 cm above the brayden. 2. Enteric tube remains in the esophagus. The tube should be advanced 10 cm. 3. Right lung pneumonia. 4. Left basilar atelectasis or pneumonia. 5. Suspect pulmonary edema superimposed on chronic interstitial lung disease. XR CHEST 1 VIEW    (Results Pending)           ASSESSMENT/PLAN:    #Acute on chronic hypoxic and hypercarbic respiratory failure  #Advanced end-stage COPD  -Recurrent admissions for acute respiratory failure in the setting of severe anxiety . Brought in from the nursing home and obtunded state, reportedly after getting Ativan for anxiety  - intubated in ER,  Admitted  to ICU. Seen by intensivist  -Patient is currently stable on mechanical ventilation, blood gases are stable. Plan is to extubate soon,  patient will be extubated to BiPAP    #Severe advanced COPD with AE  - steroids  - duonebs  Resume BiPAP    #Chronic anxiety  #Panic attacks  -Patient was given BuSpar for anxiety in the past this has not helped her . during her last visit to the hospital .  Give her low-dose Ativan which helped her anxiety .   -Continue Zoloft  Patient has been in the nursing home for the last 10 days . #Lactic acidosis  -In the setting of respiratory failure.    Lactic acid levels are improving now    #CAD  #HTN  - cont lopressor, statin, plavix    #GERD  - cont PPI    #Severe malnutrition  #Generalized weakness and debility      DVT Prophylaxis: Lovenox   Diet: Diet NPO  Code Status: Full Code               Jed Spatz, MD   10/12/2021

## 2021-10-12 NOTE — CARE COORDINATION
Case Management Assessment  Initial Evaluation      Patient Name: Amadou Patel  YOB: 1940  Diagnosis: Acute respiratory failure ([address] [J96.00]  Date / Time: 10/12/2021  2:29 AM    Admission status/Date: 10/12/2021 Inpatient   Chart Reviewed: Yes      Patient Interviewed: No -pt on the Vent in the ED  Family Interviewed:  Yes - pt's daughter Douglas Levy at bedside      Hospitalization in the last 30 days:  Yes from 09/28/2021 and discharged the same day back to [facility])    Health Care Decision Maker :   Primary Decision Maker: Betty Hassan - Child - [phone]    Secondary Decision Maker: Hafsa Tapia - Niece/Nephew - [phone]    (CM - must 1st enter selection under Navigator - emergency contact- [place] Relationship and pick relationship)   Who do you trust or have selected to make healthcare decisions for you      Met with: pt's daughter  Jesus Rodarte conducted  (bedside/phone): bedside    Current PCP: Cathy Rock MD    Financial  Medicare and [place] required for SNF : N          3 night stay required -  N-WAIVED    ADLS  Support Systems/Care Needs:    Transportation: family  ; currently EMS   Meal Preparation: self and family when home; currently [place]: Pt was at [facility] skilled. Prior to her several readmissions to the hospital, she lives at home alone with family living across the street.  Her daughter stated she is alone during the day and they are with her at night  Steps: 1  Intent for return to present living arrangements: Yes back to [facility] unless pt can return home per daughter  Identified Issues: Pt has had several readmissions    Home Care Information  Active with [address] : No Agency:(Services)     Passport/Waiver : No  :                      Phone Number:    Passport/Waiver Services: [address]   DME Provider: Currently provided by [facility] but has [employer] at home  Equipment: DME obtained from CM note on 09/16/2021 for home  Walker_x__Cane___RTS___ BSC__x_Shower Chair_x__Hospital Bed___W/C__x__Other________  02 at __2 - 2 1/2 __Liter(s)---wears(frequency)___cont____ HHN ___ CPAP___ BiPap___   N/A____      Home O2 Use :  Yes    If No for home O2---if presently on O2 during hospitalization:  Yes  if yes CM to follow for potential DC O2 need  Informed of need for care provider to bring portable home O2 tank on day of discharge for nursing to connect prior to leaving:   Not Indicated  Verbalized agreement/Understanding:   Not Indicated    Community Service Affiliation  Dialysis:  No    · Agency:  · Location:  · Dialysis Schedule:  · Phone:   · Fax: Other Community Services:n/a    DISCHARGE PLAN: Explained Case Management role/services. Chart review completed. Met with pt and her daughter at bedside. Pt is on a Vent. Pt's daughter completed the assessment. She stated pt will return to [facility] unless she can return home. She is aware pt is admitted and awaiting an ICU bed as she inquired. She is aware writer will call [facility] to have them follow pt's stay and she stated agreement. She stated pt has a bipap at the SNF and a cpap. She stated she is doing better with the change of anxiety medications. She denied further needs or questions for CM. Message left for Martha Wilks at [facility] requesting a call back. CM will follow. Please notify CM if needs or concerns arise. Addendum at 9:55am: Received return call from Martha Wilks at [facility] who stated pt was at [facility] on a Trilogy device. She stated pt can return skilled if appropriate and if there is a bed. She stated she will talk with their DON about the Respiratory therapist leaving (see readmission note from [facility]  [address]). Martha Wilks stated there was a plan in place with having staff and family be at the facility on admission to [facility].      Addendum at 11:02am: Received voicemail from Martha Wilks at [facility] stating the  met with pt's family on the last admission, RN was present and the RT was present but did come and go as they were setting up the triology and family denied additional questions when the  asked. Nadira Rodrigez stated that their MD was planning on adjusting pt's anxiety medications when they arrived this week to the facility.  Bre hurst pt currently on a Vent

## 2021-10-12 NOTE — CONSULTS
Reason for referral and CC: Chronic hypercarbic respiratory failure    HISTORY OF PRESENT ILLNESS: 56-year-old female with end-stage COPD and recurrent admissions for life-threatening hypercarbic respiratory failure. 4-5 admissions in last 2 months for the same problem. This time she was admitted [facility] where she had been d/c with trilogy AVAPS. She arrived to the ED lethargic and was intubated and is unable to give history. Past Medical History:   Diagnosis Date    NADJA (acute kidney injury) ([address]     Asthma     Chronic anxiety     COPD [facility]ch[facility] obstructive pulmonary disease) ([facility])     GERD (gastroesophageal reflux disease) 9/9/2021    Hyperlipidemia     Hypertension     MRSA (methicillin resistant staph aureus) culture positive 09/22/2019    + resp cx    OA (osteoarthritis) 8/12/2014     Past Surgical History:   Procedure Laterality Date    BRONCHOSCOPY  09/08/2019    Dr. Ancelmo Go - w/BAL   [address]  09/05/2019    Dr. Linnette So 2/24/2020    COLONOSCOPY DIAGNOSTIC performed by Kvng Carver DO at [address] [facility] [place] N/A 9/19/2019    OFF PUMP CORONARY ARTERY BYPASS GRAFTING X2, INTERNAL MAMMARY ARTERY, SAPHENOUS VEIN GRAFT performed by Jason Aleman MD at [facility] CATH  9/20/2021    IR MIDLINE CATH 9/20/2021 [facility]    MASTECTOMY, PARTIAL Left     SIGMOIDOSCOPY  02/27/2020    4 bands    SIGMOIDOSCOPY N/A 2/27/2020    FLEX SIG W/ BANDING SIGMOIDOSCOPY DIAGNOSTIC FLEXIBLE performed by Kvng Carver DO at [address] History  family history includes Cancer in her mother; Heart Disease in her father and sister; Stroke in her father and sister. Social History:  reports that she quit smoking about 2 years ago. Her smoking use included cigarettes. She has a 25.00 pack-year smoking history.  She has never used smokeless tobacco.   reports no history of alcohol use. ALLERGIES:  Patient is allergic to latex and codeine. Continuous Infusions:   midazolam Stopped (10/12/21 0644)    sodium chloride 1,000 mL (10/12/21 0645)    sodium chloride       Scheduled Meds:   clopidogrel  75 mg Oral Daily    atorvastatin  20 mg Oral Nightly    ipratropium-albuterol  3 mL Inhalation Q6H    magnesium oxide  400 mg Oral Daily    metoprolol tartrate  25 mg Oral BID    pantoprazole  20 mg Oral Daily    polyethylene glycol  17 g Oral BID    sodium chloride flush  5-40 mL IntraVENous 2 times per day    enoxaparin  40 mg SubCUTAneous Daily    [START ON 10/13/2021] methylPREDNISolone  40 mg IntraVENous Daily    Followed by   Reggie Caballero ON 10/15/2021] predniSONE  40 mg Oral Daily    ketorolac  15 mg IntraVENous Once     PRN Meds:  sodium chloride flush, sodium chloride, ondansetron **OR** ondansetron, polyethylene glycol, acetaminophen **OR** acetaminophen, fentanNYL, midazolam      PHYSICAL EXAM: /70   Pulse 90   Temp 97.9 °F (36.6 °C) (Bladder)   Resp 21   Ht 5' 1\" (1.549 m)   Wt 105 lb (47.6 kg)   SpO2 99%   BMI 19.84 kg/m²  on vent  Constitutional:  No acute distress. Eyes: PERRL. Conjunctivae anicteric. ENT: Normal nose. Normal tongue. Neck:  Trachea is midline. No thyroid tenderness. Respiratory: No accessory muscle usage. decreased breath sounds. No wheezes. No rales. No Rhonchi. Cardiovascular: Normal S1S2. No digit clubbing. No digit cyanosis. No LE edema. Gastrointestinal: No mass palpated. No tenderness palpated. No umbilical hernia. Lymphatic: No cervical or supraclavicular adenopathy. Skin: No rash on the exposed extremities. No Nodules or induration on exposed extremities.   Psychiatric:sedated    CBC:   Recent Labs     10/12/21  0236   WBC 15.1*   HGB 10.5*   HCT 34.7*   MCV 94.7        BMP:   Recent Labs     10/12/21  0236      K 4.7      CO2 26   BUN 16   CREATININE 1.1        Recent Labs     10/12/21  0236   AST 77*   ALT 39   BILITOT 0.3   ALKPHOS 132*     No results for input(s): PROTIME, INR in the last 72 hours. Recent Labs     10/12/21  0405   COLORU Yellow   PHUR 6.5   WBCUA 6-9*   RBCUA 3-4   YEAST Present*   BACTERIA 2+*   CLARITYU Clear   SPECGRAV 1.025   LEUKOCYTESUR TRACE*   UROBILINOGEN 0.2   BILIRUBINUR Negative   BLOODU SMALL*   GLUCOSEU Negative     Recent Labs     10/12/21  0300 10/12/21  0640   PHART 7.196* 7.310*   EIA9AVH 74.4* 52.9*   PO2ART 109.8* 96.5       Chest imaging was reviewed by me and showed   Scattered opacities are seen throughout the left and right lung, slightly   decreased compared to prior. Superimposed pleural effusions are seen. ASSESSMENT:  · Acute on chronic hypercarbic and hypoxic respiratory failure  · End-stage COPD  · Pleural effusions and CHF related changes on CTPA    PLAN:  · Advance NGT if not extubated  · MV per my orders.    · SBT  · Stop versed gtt  · Duonebs  · Steroids - decrease to daily  · Lasix if BP tolerates  · Lovenox DVT prophylaxis  · CCT 31 min  Thank you Gopi Leone DO for this consult

## 2021-10-12 NOTE — CARE COORDINATION
Readmission Assessment  Number of Days since last admission?: 8-30 days (Pt was at [facility] from 09/28/2021- 09/28/2021.)  Previous Disposition: SNF (Pt returned to [facility]))  Who is being Yosvany Sadler: Caregiver (Pt's daughter Mónica Neighbor)  What was the patient's/caregiver's perception as to why they think they needed to return back to the hospital?: Other (Comment) (Pt's daughter stated pt couldn't breathe, had an anxiety attack, her o2 went down and her CoO2 went up)  Did you visit your Primary Care Physician after you left the hospital, before you returned this time?: Yes (Pt seen an MD at [facility])  Did you see a specialist, such as Cardiac, Pulmonary, Orthopedic Physician, etc. after you left the hospital?: No  Who advised the patient to return to the hospital?: Skilled Unit  Does the patient report anything that got in the way of taking their medications?: No  In our efforts to provide the best possible care to you and others like you, can you think of anything that we could have done to help you after you left the hospital the first time, so that you might not have needed to return so soon?: Other (Comment) (Pt's daughter stated that they RT at [facility] left when they were supposed to meet them there.)

## 2021-10-12 NOTE — PROGRESS NOTES
Pt arrived to ICU room #14 from ED on portable ventilator & monitor devices. Pt awake- following commands. PIVs wnl. Propofol gtt at 30 mcg. Bill in place for strict I/o. Consults called. Pt in bilateral soft wrist restraints for safety. New orders released. 4 Eyes Skin Assessment     The patient is being assess for   Admission    I agree that 2 RN's have performed a thorough Head to Toe Skin Assessment on the patient. ALL assessment sites listed below have been assessed. Areas assessed for pressure by both nurses:   [x]   Head, Face, and Ears   [x]   Shoulders, Back, and Chest, Abdomen  [x]   Arms, Elbows, and Hands   [x]   Coccyx, Sacrum, and Ischium  [x]   Legs, Feet, and Heels      Stage 3 on sacrum- mepilex   Bruising- scattered     **SHARE this note so that the co-signing nurse is able to place an eSignature**    Co-signer eSignature: Electronically signed by Kwan Muller RN on 10/12/21 at 1:47 PM EDT    Does the Patient have Skin Breakdown related to pressure?   Yes           Trey Prevention initiated:  Yes   Wound Care Orders initiated:  No      WOC nurse consulted for Pressure Injury (Stage 3,4, Unstageable, DTI, NWPT, Complex wounds)and New or Established Ostomies:  Yes      Primary Nurse eSignature: Electronically signed by eLnnie Drummond RN on 10/12/21 at 6:27 PM EDT

## 2021-10-12 NOTE — CONSULTS
Palliative Care Initial Note  Palliative Care Admit date: 10/12/21    Advance Directives: Full Code    Plan of care/goals: return to [facility] and continue treatment     Social/Spiritual: Prayer Support    Plan: pt in ICU on Ventilator. Writer spoke with pt's Court Stevenson, r/t code status, goals of care. Per daughter, Lew Salazar she states pt is to remain full code and does not want hospice at this time. Plan continues return to [facility] at d/c. Following.     Reason for consult:    ___ Advance Care Planning  _x__ Transition of Care Planning  ___ Psychosocial/Spiritual Support  ___ Symptom Management    Rosaura Ya RN Palliative Care

## 2021-10-12 NOTE — PROGRESS NOTES
Versed 70 ml wasted in the destroy with Cristhian BECKETT.  Electronically signed by María Elena Meraz RN on 10/12/21 at 6:30 PM EDT

## 2021-10-12 NOTE — PROGRESS NOTES
RT Inhaler-Nebulizer Bronchodilator Protocol Note    There is a bronchodilator order in the chart from a provider indicating to follow the RT Bronchodilator Protocol and there is an Initiate RT Inhaler-Nebulizer Bronchodilator Protocol order as well (see protocol at bottom of note). CXR Findings:  XR CHEST PORTABLE    Result Date: 10/12/2021  Tip of NG tube is at the GE junction. Consider further advancement by approximately 8-9 cm Slight improved aeration of the lungs. Bilateral pleuroparenchymal disease remains. XR CHEST PORTABLE    Result Date: 10/12/2021  1. The endotracheal tube tip is approximately 3 cm above the brayden. 2. Enteric tube remains in the esophagus. The tube should be advanced 10 cm. 3. Right lung pneumonia. 4. Left basilar atelectasis or pneumonia. 5. Suspect pulmonary edema superimposed on chronic interstitial lung disease. The findings from the last RT Protocol Assessment were as follows:   History Pulmonary Disease: (P) Chronic pulmonary disease  Respiratory Pattern: (P) Dyspnea on exertion or RR 21-25 bpm  Breath Sounds: (P) Slightly diminished and/or crackles  Cough: (P) Strong, spontaneous, non-productive  Indication for Bronchodilator Therapy: (P) On home bronchodilators  Bronchodilator Assessment Score: (P) 6    Aerosolized bronchodilator medication orders have been revised according to the RT Inhaler-Nebulizer Bronchodilator Protocol below. Respiratory Therapist to perform RT Therapy Protocol Assessment initially then follow the protocol. Repeat RT Therapy Protocol Assessment PRN for score 0-3 or on second treatment, BID, and PRN for scores above 3. No Indications  adjust the frequency to every 6 hours PRN wheezing or bronchospasm, if no treatments needed after 48 hours then discontinue using Per Protocol order mode. If indication present, adjust the RT bronchodilator orders based on the Bronchodilator Assessment Score as indicated below.   Use Inhaler orders unless patient has one or more of the following: on home nebulizer, not able to hold breath for 10 seconds, is not alert and oriented, cannot activate and use MDI correctly, or respiratory rate 25 breaths per minute or more, then use the equivalent nebulizer order(s) with same Frequency and PRN reasons based on the score. If a patient is on this medication at home then do not decrease Frequency below that used at home. 0-3  enter or revise RT bronchodilator order(s) to equivalent RT Bronchodilator order with Frequency of every 4 hours PRN for wheezing or increased work of breathing using Per Protocol order mode. 4-6  enter or revise RT Bronchodilator order(s) to two equivalent RT bronchodilator orders with one order with BID Frequency and one order with Frequency of every 4 hours PRN wheezing or increased work of breathing using Per Protocol order mode. 7-10  enter or revise RT Bronchodilator order(s) to two equivalent RT bronchodilator orders with one order with TID Frequency and one order with Frequency of every 4 hours PRN wheezing or increased work of breathing using Per Protocol order mode. 11-13  enter or revise RT Bronchodilator order(s) to one equivalent RT bronchodilator order with QID Frequency and an Albuterol order with Frequency of every 4 hours PRN wheezing or increased work of breathing using Per Protocol order mode. Greater than 13  enter or revise RT Bronchodilator order(s) to one equivalent RT bronchodilator order with every 4 hours Frequency and an Albuterol order with Frequency of every 2 hours PRN wheezing or increased work of breathing using Per Protocol order mode. RT to enter RT Home Evaluation for COPD & MDI Assessment order using Per Protocol order mode.     Electronically signed by Leonarda Kaminski RCP on 10/12/2021 at 3:19 PM

## 2021-10-12 NOTE — ED PROVIDER NOTES
[address] ED  EMERGENCY DEPARTMENT ENCOUNTER      Pt Name: Saeed Mendoza  MRN: [id number]  [place] 1940  Date of evaluation: 10/12/2021  Provider: Carol Lake, [address] [place] [address]       Chief Complaint   Patient presents with    Respiratory Distress     from [address]   (Location/Symptom, Timing/Onset, Context/Setting, Quality, Duration, Modifying Factors, Severity)  Note limiting factors. Saeed Mendoza is a [de-identified] y.o. female who presents to the emergency department complaining of acute on chronic respiratory failure, patient unresponsive on arrival.  By report patient to call out to [employer] [facility] staff saying that she was short of breath and anxious, sats were checked and found to be in the 45s. Patient reportedly with does wear CPAP at night. Patient not able to provide history on arrival.  Limited responsiveness on arrival with pulse, belly breathing decision was made to intubate patient on arrival for airway protection. HPI limited due to altered mentation, acute respiratory failure        Nursing Notes were reviewed.     PAST MEDICAL HISTORY     Past Medical History:   Diagnosis Date    NADJA (acute kidney injury) ([address]     Asthma     Chronic anxiety     [facility]     GERD (gastroesophageal reflux disease) 9/9/2021    Hyperlipidemia     Hypertension     MRSA (methicillin resistant staph aureus) culture positive 09/22/2019    + resp cx    OA (osteoarthritis) 8/12/2014         SURGICAL HISTORY       Past Surgical History:   Procedure Laterality Date    BRONCHOSCOPY  09/08/2019    Dr. Michelle Rocha - w/BAL   [address]  09/05/2019    Dr. Carlyn Kent 2/24/2020    COLONOSCOPY DIAGNOSTIC performed by Nay Rodriguez DO at [address] N/A 9/19/2019    OFF PUMP CORONARY ARTERY BYPASS GRAFTING X2, INTERNAL MAMMARY ARTERY, SAPHENOUS VEIN GRAFT performed by Zaki Elizabeth MD at [facility] CATH  9/20/2021    IR MIDLINE CATH 9/20/2021 [facility] SPECIAL PROCEDURES    MASTECTOMY, PARTIAL Left     SIGMOIDOSCOPY  02/27/2020    4 bands    SIGMOIDOSCOPY N/A 2/27/2020    FLEX SIG W/ BANDING SIGMOIDOSCOPY DIAGNOSTIC FLEXIBLE performed by Marialuisa Meneses DO at [address]       Previous Medications    ACETAMINOPHEN (TYLENOL) 500 MG TABLET    Take 500 mg by mouth every 6 hours as needed for Pain    ALBUTEROL (PROVENTIL HFA) 108 (90 BASE) MCG/ACT INHALER    Inhale 2 puffs into the lungs every 6 hours as needed for Wheezing or Shortness of Breath. ATORVASTATIN (LIPITOR) 20 MG TABLET    Take 1 tablet by mouth nightly    BUDESONIDE (PULMICORT) 0.5 MG/2ML NEBULIZER SUSPENSION    Take 2 mLs by nebulization 2 times daily    BUDESONIDE-FORMOTEROL (SYMBICORT) 160-4.5 MCG/ACT AERO    Inhale 2 puffs into the lungs 2 times daily    CLOPIDOGREL (PLAVIX) 75 MG TABLET    Take 1 tablet by mouth daily    DOCUSATE SODIUM (COLACE, DULCOLAX) 100 MG CAPS    Take 100 mg by mouth 2 times daily    FERROUS SULFATE (IRON 325) 325 (65 FE) MG TABLET    Take 325 mg by mouth daily (with breakfast)    FLUTICASONE (FLONASE) 50 MCG/ACT NASAL SPRAY    1 spray by Each Nostril route daily as needed for Rhinitis    FUROSEMIDE (LASIX) 20 MG TABLET    Take 1 tablet by mouth See Admin Instructions Tuesday, friday    IPRATROPIUM-ALBUTEROL (DUONEB) 0.5-2.5 (3) MG/3ML SOLN NEBULIZER SOLUTION    Inhale 3 mLs into the lungs every 6 hours    MAGNESIUM OXIDE (MAG-OX) 400 (241.3 MG) MG TABS TABLET    Take 1 tablet by mouth daily    MELATONIN 5 MG TBDP DISINTEGRATING TABLET    Take 1 tablet by mouth nightly as needed (sleep)    MENTHOL-ZINC OXIDE (CALMOSEPTINE) 0.44-20.6 % OINT OINTMENT    Apply topically 2 times daily as needed (Apply to buttocks until resolved) Max 30 ml per day.     METOPROLOL TARTRATE (LOPRESSOR) 25 MG TABLET Take 1 tablet by mouth 2 times daily    ONDANSETRON (ZOFRAN-ODT) 4 MG DISINTEGRATING TABLET    Take 1 tablet by mouth every 8 hours as needed for Nausea or Vomiting    PANTOPRAZOLE (PROTONIX) 20 MG TABLET    Take 20 mg by mouth daily    POLYETHYLENE GLYCOL (GLYCOLAX) 17 G PACKET    Take 17 g by mouth 2 times daily    POTASSIUM & SODIUM PHOSPHATES (PHOS-NAK) 280-160-250 MG PACK    Take 1 packet by mouth daily    PREDNISONE (DELTASONE) 10 MG TABLET    Take 4 tabs a day for 5 days ,   then 3 tabs a day for 5 days,  Then 2 tabs a day for 5 days ,  Then 1 tab daily    ROFLUMILAST (DALIRESP) 250 MCG TABLET    Take 250 mcg by mouth daily    SERTRALINE (ZOLOFT) 50 MG TABLET    Take 50 mg by mouth daily    TIOTROPIUM (SPIRIVA HANDIHALER) 18 MCG INHALATION CAPSULE    Inhale 18 mcg into the lungs daily       ALLERGIES     Latex and Codeine    FAMILY HISTORY       Family History   Problem Relation Age of Onset    Cancer Mother     Heart Disease Father     Stroke Father     Heart Disease Sister     Stroke Sister           SOCIAL HISTORY       Social History     Socioeconomic History    Marital status:       Spouse name: Not on file    Number of children: Not on file    Years of education: Not on file    Highest education level: Not on file   Occupational History    Not on file   Tobacco Use    Smoking status: Former Smoker     Packs/day: 0.50     Years: 50.00     Pack years: 25.00     Types: Cigarettes     Quit date: 2019     Years since quittin.0    Smokeless tobacco: Never Used    Tobacco comment: advised to quit   Vaping Use    Vaping Use: Never assessed   Substance and Sexual Activity    Alcohol use: No    Drug use: No    Sexual activity: Not Currently     Comment: tab-  Day   Other Topics Concern    Not on file   Social History Narrative    Not on file     Social Determinants of Health     Financial Resource Strain:     Difficulty of Paying Living Expenses:    Food Insecurity:     Worried About [facility] in the Last Year:    [address] in the Last Year:    Transportation Needs:     Lack of Transportation (Medical):  Lack of Transportation (Non-Medical):    Physical Activity:     Days of Exercise per Week:     Minutes of Exercise per Session:    Stress:     Feeling of Stress :    Social Connections:     Frequency of Communication with Friends and Family:     Frequency of Social Gatherings with Friends and Family:     Attends Yazidism Services:     Active Member of Clubs or Organizations:     Attends Club or Organization Meetings:     Marital Status:    Intimate Partner Violence:     Fear of Current or Ex-Partner:     Emotionally Abused:     Physically Abused:     Sexually Abused:        SCREENINGS                            REVIEW OF SYSTEMS    (2-9 systems for level 4, 10 or more for level 5)   Review of Systems   Unable to perform ROS: Mental status change         PHYSICAL EXAM    (up to 7 for level 4, 8 or more for level 5)   RECENT VITALS:     Temp: 97.9 °F (36.6 °C),  Pulse: 81, Resp: 20, BP: 115/64, SpO2: 99 %    Physical Exam  Constitutional:       General: She is not in acute distress. Appearance: She is ill-appearing. She is not diaphoretic. HENT:      Head: Normocephalic and atraumatic. Eyes:      Pupils: Pupils are equal, round, and reactive to light. Neck:      Trachea: No tracheal deviation. Cardiovascular:      Rate and Rhythm: Normal rate and regular rhythm. Pulmonary:      Effort: Respiratory distress present. Breath sounds: Wheezing present. No rhonchi. Abdominal:      General: There is no distension. Palpations: Abdomen is soft. Musculoskeletal:         General: No deformity. Cervical back: Normal range of motion and neck supple. Skin:     General: Skin is warm. Findings: Bruising present. Neurological:      Mental Status: She is disoriented.          DIAGNOSTIC RESULTS     EKG: All EKG's are interpreted by the Emergency Department Physician who either signs or Co-signs this chart in the absence of a cardiologist.      The Ekg interpreted by me shows  sinus tachycardia, jvsh=253   Axis is   Normal  QTc is  normal  Intervals and Durations are unremarkable. ST Segments: nonspecific changes    RADIOLOGY:   Non-plain film images such as CT, Ultrasound and MRI are read by the radiologist. Plain radiographic images are visualized and preliminarily interpreted by the emergency physician. Interpretation per the Radiologist below, if available at the time of this note:    CT CHEST PULMONARY EMBOLISM W CONTRAST   Preliminary Result   1. Enteric tube distal tip at the GE junction. Advancement is recommended so   that the NG tube is appropriately positioned and side-port/distal tip are   located beyond the GE junction. 2. No clear evidence for pulmonary embolus. 3. Moderate bilateral pleural effusions. Interlobular septal thickening   throughout both lungs with respiratory motion. Compressive atelectasis in   the posterior aspect of both lungs. Cardiomegaly. Left atrial enlargement. Findings can be seen with CHF-related changes. Follow-up is recommended to   document resolution. 4. Adrenal hyperplasia. 5. Atherosclerotic calcification of the aorta and branch vasculature. Coronary artery disease. 6. Prior median sternotomy. XR CHEST PORTABLE   Final Result   1. The endotracheal tube tip is approximately 3 cm above the brayden. 2. Enteric tube remains in the esophagus. The tube should be advanced 10 cm. 3. Right lung pneumonia. 4. Left basilar atelectasis or pneumonia. 5. Suspect pulmonary edema superimposed on chronic interstitial lung disease.          XR CHEST PORTABLE    (Results Pending)         LABS:  Labs Reviewed   CBC WITH AUTO DIFFERENTIAL - Abnormal; Notable for the following components:       Result Value    WBC 15.1 (*)     RBC 3.66 (*)     Hemoglobin 10.5 (*) Hematocrit 34.7 (*)     MCHC 30.3 (*)     RDW 21.4 (*)     Neutrophils Absolute 8.0 (*)     Lymphocytes Absolute 5.9 (*)     All other components within normal limits    Narrative:     Performed at:  [facility],  [place], [place]   Phone [phone]   COMPREHENSIVE METABOLIC PANEL W/ REFLEX TO MG FOR LOW K - Abnormal; Notable for the following components:    Glucose 256 (*)     GFR Non- 48 (*)     GFR African American 58 (*)     Alkaline Phosphatase 132 (*)     AST 77 (*)     All other components within normal limits    Narrative:     Performed at:  [place] [address],  [place], [place]   Phone [phone]   BLOOD GAS, VENOUS - Abnormal; Notable for the following components:    pH, Zach 7.035 (*)     pCO2, Zach 105.7 (*)     pO2, Zach 157.8 (*)     Base Excess, Zach -5.1 (*)     All other components within normal limits    Narrative:     [address] [phone],  Chemistry results called to and read back by Gary Cleaning RN, 10/12/2021  03:19, by [address] [place] [address] results called to and read back by Gary Cleaning RN, 10/12/2021  03:11, by Nisha Morillo  Performed at:  [place] [address],  [place], [place]   Phone [phone]   TROPONIN - Abnormal; Notable for the following components:    Troponin 0.06 (*)     All other components within normal limits    Narrative:     Performed at:  [place] [address],  [place], [place]   Phone [phone]   LACTATE, SEPSIS - Abnormal; Notable for the following components:    Lactic Acid, Sepsis 5.4 (*)     All other components within normal limits    Narrative:     Piotr Lau tel. [phone],  Chemistry results called to and read back by Dr Dana Robles, 10/12/2021 03:24,  by Nisha Morillo  Performed at:  [facility] UA 2+ (*)     Yeast, UA Present (*)     All other components within normal limits    Narrative:     Performed at:  [facility],  [place], [facility]   Phone [phone]   BLOOD GAS, ARTERIAL - Abnormal; Notable for the following components:    pH, Arterial 7.310 (*)     pCO2, Arterial 52.9 (*)     Hemoglobin, Art, Extended 9.2 (*)     All other components within normal limits    Narrative:     Performed at:  [facility],  [place], [facility]   Phone [phone]    Narrative:     Performed at:  [facility],  [place], [facility]   Phone [phone]   CULTURE, URINE   CULTURE, BLOOD 2   CULTURE, BLOOD 1   GRAM STAIN       All other labs were within normal range or not returned as of this dictation. EMERGENCY DEPARTMENT COURSE and DIFFERENTIAL DIAGNOSIS/MDM:   Hali Kimbrough is a [de-identified] y.o. female who presents to the emergency department with the complaint of patient arrives minimal responsiveness, belly breathing, reportedly hypoxic to 40% at nursing home, arrived by EMS, with BVM in progress. No loss of pulse. Limited responsiveness to noxious stimulus decision was made to intubate patient for airway protection as at this time I do not feel patient would be appropriate for BiPAP management as she was previous admission. She is tachycardic on arrival, while receiving BVM ventilation sats in the upper 90s. Patient was successfully intubated with glide scope. Suspect COPD exacerbation due to known history, expiratory wheezing noted throughout exam.  We will should cover with antibiotics for COPD exacerbation. Patient's initial ABG concern for hypercapnic hypoxic respiratory failure, will trend. Due to tachycardia and profound hypoxia CT PE study was ordered and found to be negative for pulmonary embolus.   Initial x-ray showed possible right lower lobe pneumonia however this is not evident on CT imaging. Blood pressures low end while on sedation however to improve off sedation after small fluid bolus. Patient's troponin is chronically elevated however it is improved from prior check, significant provement overall BNP as well. Patient was made to hospital service for management of acute on chronic respiratory failure with hypoxia and hypercapnia. CRITICAL CARE TIME   Total Critical Care time was 45 minutes, excluding separately reportable procedures. There was a high probability of clinically significant/life threatening deterioration in the patient's condition which required my urgent intervention. Clinical concern respiratory failure with hypoxia and hypercapnia secondary to COPD exacerbation  Intervention multiple reevaluations to assess vent settings, hemodynamic stability, lab interpretation, medication management, charting    CONSULTS:  IP CONSULT TO HOSPITALIST  IP CONSULT TO PULMONOLOGY    PROCEDURES:  Unless otherwise noted below, none     Intubation    Date/Time: 10/12/2021 8:19 AM  Performed by: Carol Lake DO  Authorized by: Carol Lake DO     Consent:     Consent obtained:  Emergent situation  Pre-procedure details:     Patient status:  Altered mental status    Paralytics:  Rocuronium  Procedure details:     Preoxygenation:  Bag valve mask    CPR in progress: no      Intubation method:  Oral    Oral intubation technique:  Video-assisted    Laryngoscope blade:   Mac 3    Tube size (mm):  7.5    Tube type:  Cuffed    Number of attempts:  1    Ventilation between attempts: no      Cricoid pressure: no      Tube visualized through cords: yes    Placement assessment:     ETT to lip:  25    ETT to teeth:  24    Tube secured with:  ETT miller    Breath sounds:  Equal    Placement verification: chest rise, condensation, CXR verification, direct visualization and equal breath sounds    Post-procedure details:     Patient tolerance of procedure: Tolerated well, no immediate complications            FINAL IMPRESSION      1. Acute respiratory failure with hypoxia and hypercapnia (HCC)    2. COPD exacerbation ([address]          DISPOSITION/PLAN   DISPOSITION admit        PATIENT REFERRED TO:  No follow-up provider specified. DISCHARGE MEDICATIONS:  New Prescriptions    No medications on file     Controlled Substances Monitoring:     No flowsheet data found.     (Please note that portions of this note were completed with a voice recognition program.  Efforts were made to edit the dictations but occasionally words are mis-transcribed.)    Chon Aguayo DO (electronically signed)  Attending Emergency Physician            Chon Aguayo DO  10/12/21 3 Sparkle Taylor DO  10/12/21 [id number]

## 2021-10-13 ENCOUNTER — APPOINTMENT (OUTPATIENT)
Dept: GENERAL RADIOLOGY | Age: 81
DRG: 208 | End: 2021-10-13
Payer: MEDICARE

## 2021-10-13 LAB
ALBUMIN SERPL-MCNC: 2.8 G/DL (ref 3.4–5)
ANION GAP SERPL CALCULATED.3IONS-SCNC: 8 MMOL/L (ref 3–16)
BASOPHILS ABSOLUTE: 0 K/UL (ref 0–0.2)
BASOPHILS RELATIVE PERCENT: 0.2 %
BUN BLDV-MCNC: 15 MG/DL (ref 7–20)
CALCIUM SERPL-MCNC: 8.5 MG/DL (ref 8.3–10.6)
CHLORIDE BLD-SCNC: 106 MMOL/L (ref 99–110)
CO2: 26 MMOL/L (ref 21–32)
CREAT SERPL-MCNC: 0.8 MG/DL (ref 0.6–1.2)
EOSINOPHILS ABSOLUTE: 0.1 K/UL (ref 0–0.6)
EOSINOPHILS RELATIVE PERCENT: 1.9 %
GFR AFRICAN AMERICAN: >60
GFR NON-AFRICAN AMERICAN: >60
GLUCOSE BLD-MCNC: 73 MG/DL (ref 70–99)
GLUCOSE BLD-MCNC: 77 MG/DL (ref 70–99)
GLUCOSE BLD-MCNC: 86 MG/DL (ref 70–99)
GLUCOSE BLD-MCNC: 94 MG/DL (ref 70–99)
GLUCOSE BLD-MCNC: 99 MG/DL (ref 70–99)
HCT VFR BLD CALC: 25.5 % (ref 36–48)
HEMOGLOBIN: 8.1 G/DL (ref 12–16)
LYMPHOCYTES ABSOLUTE: 1.2 K/UL (ref 1–5.1)
LYMPHOCYTES RELATIVE PERCENT: 15.5 %
MCH RBC QN AUTO: 29.1 PG (ref 26–34)
MCHC RBC AUTO-ENTMCNC: 32 G/DL (ref 31–36)
MCV RBC AUTO: 90.9 FL (ref 80–100)
MONOCYTES ABSOLUTE: 0.3 K/UL (ref 0–1.3)
MONOCYTES RELATIVE PERCENT: 3.8 %
NEUTROPHILS ABSOLUTE: 5.8 K/UL (ref 1.7–7.7)
NEUTROPHILS RELATIVE PERCENT: 78.6 %
ORGANISM: ABNORMAL
PDW BLD-RTO: 21.2 % (ref 12.4–15.4)
PERFORMED ON: NORMAL
PHOSPHORUS: 2.6 MG/DL (ref 2.5–4.9)
PLATELET # BLD: 180 K/UL (ref 135–450)
PMV BLD AUTO: 8.2 FL (ref 5–10.5)
POTASSIUM SERPL-SCNC: 3.6 MMOL/L (ref 3.5–5.1)
RBC # BLD: 2.8 M/UL (ref 4–5.2)
SODIUM BLD-SCNC: 140 MMOL/L (ref 136–145)
URINE CULTURE, ROUTINE: ABNORMAL
WBC # BLD: 7.4 K/UL (ref 4–11)

## 2021-10-13 PROCEDURE — 6370000000 HC RX 637 (ALT 250 FOR IP): Performed by: INTERNAL MEDICINE

## 2021-10-13 PROCEDURE — 6360000002 HC RX W HCPCS: Performed by: INTERNAL MEDICINE

## 2021-10-13 PROCEDURE — 99233 SBSQ HOSP IP/OBS HIGH 50: CPT | Performed by: INTERNAL MEDICINE

## 2021-10-13 PROCEDURE — 2700000000 HC OXYGEN THERAPY PER DAY

## 2021-10-13 PROCEDURE — 2580000003 HC RX 258: Performed by: INTERNAL MEDICINE

## 2021-10-13 PROCEDURE — 6370000000 HC RX 637 (ALT 250 FOR IP): Performed by: NURSE PRACTITIONER

## 2021-10-13 PROCEDURE — 36415 COLL VENOUS BLD VENIPUNCTURE: CPT

## 2021-10-13 PROCEDURE — 80069 RENAL FUNCTION PANEL: CPT

## 2021-10-13 PROCEDURE — 94660 CPAP INITIATION&MGMT: CPT

## 2021-10-13 PROCEDURE — 2060000000 HC ICU INTERMEDIATE R&B

## 2021-10-13 PROCEDURE — 85025 COMPLETE CBC W/AUTO DIFF WBC: CPT

## 2021-10-13 PROCEDURE — 94761 N-INVAS EAR/PLS OXIMETRY MLT: CPT

## 2021-10-13 PROCEDURE — 94640 AIRWAY INHALATION TREATMENT: CPT

## 2021-10-13 RX ORDER — IPRATROPIUM BROMIDE AND ALBUTEROL SULFATE 2.5; .5 MG/3ML; MG/3ML
1 SOLUTION RESPIRATORY (INHALATION) EVERY 4 HOURS PRN
Status: DISCONTINUED | OUTPATIENT
Start: 2021-10-13 | End: 2021-10-14 | Stop reason: HOSPADM

## 2021-10-13 RX ORDER — FUROSEMIDE 10 MG/ML
20 INJECTION INTRAMUSCULAR; INTRAVENOUS ONCE
Status: COMPLETED | OUTPATIENT
Start: 2021-10-13 | End: 2021-10-13

## 2021-10-13 RX ORDER — BUSPIRONE HYDROCHLORIDE 10 MG/1
5 TABLET ORAL 3 TIMES DAILY
Status: ON HOLD | COMMUNITY
End: 2022-03-30

## 2021-10-13 RX ORDER — BUSPIRONE HYDROCHLORIDE 10 MG/1
10 TABLET ORAL 3 TIMES DAILY
Status: DISCONTINUED | OUTPATIENT
Start: 2021-10-13 | End: 2021-10-14 | Stop reason: HOSPADM

## 2021-10-13 RX ADMIN — BUSPIRONE HYDROCHLORIDE 10 MG: 10 TABLET ORAL at 09:29

## 2021-10-13 RX ADMIN — MICONAZOLE NITRATE: 2 OINTMENT TOPICAL at 20:58

## 2021-10-13 RX ADMIN — PANTOPRAZOLE SODIUM 20 MG: 20 TABLET, DELAYED RELEASE ORAL at 09:29

## 2021-10-13 RX ADMIN — SERTRALINE HYDROCHLORIDE 50 MG: 50 TABLET ORAL at 12:10

## 2021-10-13 RX ADMIN — IPRATROPIUM BROMIDE AND ALBUTEROL SULFATE 3 ML: 2.5; .5 SOLUTION RESPIRATORY (INHALATION) at 12:28

## 2021-10-13 RX ADMIN — BUSPIRONE HYDROCHLORIDE 10 MG: 10 TABLET ORAL at 15:27

## 2021-10-13 RX ADMIN — IPRATROPIUM BROMIDE AND ALBUTEROL SULFATE 3 ML: 2.5; .5 SOLUTION RESPIRATORY (INHALATION) at 07:12

## 2021-10-13 RX ADMIN — ACETAMINOPHEN 650 MG: 325 TABLET ORAL at 14:27

## 2021-10-13 RX ADMIN — CLOPIDOGREL BISULFATE 75 MG: 75 TABLET ORAL at 09:29

## 2021-10-13 RX ADMIN — ATORVASTATIN CALCIUM 20 MG: 10 TABLET, FILM COATED ORAL at 20:58

## 2021-10-13 RX ADMIN — BUSPIRONE HYDROCHLORIDE 10 MG: 10 TABLET ORAL at 20:58

## 2021-10-13 RX ADMIN — POLYETHYLENE GLYCOL (3350) 17 G: 17 POWDER, FOR SOLUTION ORAL at 09:29

## 2021-10-13 RX ADMIN — IPRATROPIUM BROMIDE AND ALBUTEROL SULFATE 3 ML: 2.5; .5 SOLUTION RESPIRATORY (INHALATION) at 17:16

## 2021-10-13 RX ADMIN — IPRATROPIUM BROMIDE AND ALBUTEROL SULFATE 3 ML: 2.5; .5 SOLUTION RESPIRATORY (INHALATION) at 20:14

## 2021-10-13 RX ADMIN — FUROSEMIDE 20 MG: 10 INJECTION, SOLUTION INTRAMUSCULAR; INTRAVENOUS at 09:28

## 2021-10-13 RX ADMIN — MUPIROCIN: 20 OINTMENT TOPICAL at 20:57

## 2021-10-13 RX ADMIN — ENOXAPARIN SODIUM 40 MG: 40 INJECTION SUBCUTANEOUS at 09:29

## 2021-10-13 RX ADMIN — MAGNESIUM GLUCONATE 500 MG ORAL TABLET 400 MG: 500 TABLET ORAL at 09:29

## 2021-10-13 RX ADMIN — METOPROLOL TARTRATE 25 MG: 25 TABLET, FILM COATED ORAL at 20:58

## 2021-10-13 RX ADMIN — MIDAZOLAM HYDROCHLORIDE 2 MG: 1 INJECTION, SOLUTION INTRAMUSCULAR; INTRAVENOUS at 03:30

## 2021-10-13 RX ADMIN — PREDNISONE 30 MG: 20 TABLET ORAL at 09:29

## 2021-10-13 RX ADMIN — SODIUM CHLORIDE, PRESERVATIVE FREE 10 ML: 5 INJECTION INTRAVENOUS at 20:58

## 2021-10-13 RX ADMIN — MUPIROCIN: 20 OINTMENT TOPICAL at 09:29

## 2021-10-13 RX ADMIN — METOPROLOL TARTRATE 25 MG: 25 TABLET, FILM COATED ORAL at 09:29

## 2021-10-13 ASSESSMENT — PAIN SCALES - GENERAL
PAINLEVEL_OUTOF10: 0

## 2021-10-13 NOTE — PROGRESS NOTES
Patient 2 Peripheral IVs in right AC and forearm infiltrated. This leaves no patent access for D5 infusion to continue at this time. Ultrasounds guided IV nurse attempting access at this time.     Electronically signed by Bg Trevizo RN on 10/13/2021 at 5:57 AM

## 2021-10-13 NOTE — PROGRESS NOTES
Patient reassessment complete    VSS at this time, Blood pressure 132/76, pulse 74, temperature 98.8 °F (37.1 °C), temperature source Bladder, resp. rate 22, height 5' 1\" (1.549 m), weight 105 lb (47.6 kg), SpO2 98 %,     Patient in bed eyes closed and resting at this time, denies pain or discomfort. No further changes from previous assessment.     Electronically signed by Ariane Campos RN on 10/13/2021 at 12:29 AM

## 2021-10-13 NOTE — PROGRESS NOTES
Patient reassessment complete     VSS at this time, Blood pressure 121/75, pulse 64, temperature 98.4 °F (36.9 °C), temperature source Bladder, resp. rate 20, height 5' 1\" (1.549 m), weight 105 lb (47.6 kg), SpO2 98 %,     Patient stated severe anxiety and home medication buspar not in active medications. Passed onto day RN. Prn Versed given as ordered. Patient in bed eyes closed and resting at this time, denies pain or discomfort. No further changes from previous assessment.     Electronically signed by Ashley Ghotra RN on 10/13/2021 at 4:57 AM

## 2021-10-13 NOTE — PLAN OF CARE
Nutrition Problem #1: Inadequate oral intake  Intervention: Food and/or Nutrient Delivery: Continue Current Diet, Start Oral Nutrition Supplement  Nutritional Goals: patient will accept and consume 50% or greater of her meals on ADULT DIET; Regular;  Low-Na diet order x 3 meals per day + she will accept and consume 50% or greater of Ensure Enlive/Plus with meals

## 2021-10-13 NOTE — PROGRESS NOTES
Report given to [facility] RN, pt to be transferred to PCU. PCU telemetr intact. She is presently on 2 liters nasal canula with easy/even respirations. (2) peripheral line dry/intact and saline locked. Patient c/o headache earlier and was given tylenol.

## 2021-10-13 NOTE — PROGRESS NOTES
10/13/21 0244   NIV Type   Mode Bilevel   Mask Type Full face mask   Mask Size Small   Settings/Measurements   IPAP 16 cmH20   CPAP/EPAP 8 cmH2O   Rate Ordered 16   Resp 22   FiO2  30 %   Vt Exhaled 438 mL   Comfort Level Good   SpO2 100

## 2021-10-13 NOTE — PROGRESS NOTES
10/12/21 7152   NIV Type   Mode Bilevel   Mask Type Full face mask   Mask Size Small   Settings/Measurements   IPAP 16 cmH20   CPAP/EPAP 8 cmH2O   Rate Ordered 16   Resp 20   FiO2  30 %   Vt Exhaled 592 mL   Comfort Level Good   SpO2 100

## 2021-10-13 NOTE — CARE COORDINATION
INTERDISCIPLINARY PLAN OF CARE CONFERENCE    Date/Time: 10/13/2021 1:47 PM  Completed by: Jewel Soria RN, Case Management      Patient Name:  Filipe De Dios  YOB: 1940  Admitting Diagnosis: Acute respiratory failure ([facility] [J96.00]  COPD exacerbation ([facility] [J44.1]  Acute respiratory failure with hypoxia and hypercapnia ([facility] [J96.01, J96.02]     Admit Date/Time:  10/12/2021  2:29 AM    Chart reviewed. Interdisciplinary team contacted or reviewed plan related to patient progress and discharge plans. Disciplines included Case Management, Nursing, and Dietitian. Current Status:ongoing  PT/OT recommendation for discharge plan of care: awaiting eval--orders placed    Expected D/C Disposition:  Nursing Home  Confirmed plan with patient and/or family Yes confirmed with: (name) Brad Gallego (daughter)  Met with:Juan (daughter)  Discharge Plan Comments: Reviewed chart. Role of discharge planner explained and patient's daughter, Brad Gallego, verbalized understanding. Pt is from [facility] and, per Bradlashonda Gallego, plans to return to [facility]. Pt had an Asteril (Trilogy type machine) went with her in her admission to [facility] on 9/23/2021. Pt was to have Palliative Care with her a [facility] as a full code, as well. ADDISON called and left a VM for Emelia with [facility] to let her know pt would need Palliative Care whenever she discharges back to [facility]. Pt is on home O2 prior to admission. Pt will need a rapid covid prior to admission. Addendum 10/13/2021 1438: Addison spoke with [place] with [facility] and she states that pt has a Triology at [facility] and is using. Per [employer], the visitors restrictions have been lifted and there is not visitor limits and can visit for [phone]. Emelia with [facility] states that they do not have Palliative Care come in and that [address] is contracted with them,    Cm informed Juan of the above with verbalized understanding.        Home O2 in place on admit: Yes  Pt informed of need to bring portable home O2 tank on day of discharge for nursing to connect prior to leaving:  Yes  Verbalized agreement/Understanding:   Yes

## 2021-10-13 NOTE — PROGRESS NOTES
Patient Shift Assessment Complete    VSS at this time, Blood pressure 130/74, pulse 86, temperature 98.8 °F (37.1 °C), resp. rate 22, height 5' 1\" (1.549 m), weight 105 lb (47.6 kg), SpO2 100 %,     Patient lung sounds diminished in all lobes, Patient on BiPAP 30% 8 CPAP. Tolerating well. Removed temporarily for swallow eval; PASS. Patient able to take PO medications whole with applesauce (home). Patient unable to continue PO medications for lengths of time due to dyspnea with exertion and SPO2 decline. Patient bowel sounds active in all quadrants. Patient NPO at this time. No previous fingerstick's, Glucose checked at 67. Provider notified and hypoglycemia protocol initiated. Dextrose given IV 1/2 amp. Provider ordered D5 in NS at 75 ml/hr with 1 hourly finger sticks. Patient 0/10 pain, warm blanket applied per patient request, and had no complaints, concerns or questions at this time.      Electronically signed by Nisha Hernandez RN on 10/12/2021 at 9:21 PM

## 2021-10-13 NOTE — PROGRESS NOTES
RT Inhaler-Nebulizer Bronchodilator Protocol Note    There is a bronchodilator order in the chart from a provider indicating to follow the RT Bronchodilator Protocol and there is an Initiate RT Inhaler-Nebulizer Bronchodilator Protocol order as well (see protocol at bottom of note). CXR Findings:  XR CHEST PORTABLE    Result Date: 10/12/2021  Tip of NG tube is at the GE junction. Consider further advancement by approximately 8-9 cm Slight improved aeration of the lungs. Bilateral pleuroparenchymal disease remains. XR CHEST PORTABLE    Result Date: 10/12/2021  1. The endotracheal tube tip is approximately 3 cm above the brayden. 2. Enteric tube remains in the esophagus. The tube should be advanced 10 cm. 3. Right lung pneumonia. 4. Left basilar atelectasis or pneumonia. 5. Suspect pulmonary edema superimposed on chronic interstitial lung disease. The findings from the last RT Protocol Assessment were as follows:   History Pulmonary Disease: Chronic pulmonary disease  Respiratory Pattern: Dyspnea on exertion or RR 21-25 bpm  Breath Sounds: Slightly diminished and/or crackles  Cough: Strong, spontaneous, non-productive  Indication for Bronchodilator Therapy: On home bronchodilators, Decreased or absent breath sounds  Bronchodilator Assessment Score: 6    Aerosolized bronchodilator medication orders have been revised according to the RT Inhaler-Nebulizer Bronchodilator Protocol below. Respiratory Therapist to perform RT Therapy Protocol Assessment initially then follow the protocol. Repeat RT Therapy Protocol Assessment PRN for score 0-3 or on second treatment, BID, and PRN for scores above 3. No Indications  adjust the frequency to every 6 hours PRN wheezing or bronchospasm, if no treatments needed after 48 hours then discontinue using Per Protocol order mode. If indication present, adjust the RT bronchodilator orders based on the Bronchodilator Assessment Score as indicated below.   Use Inhaler orders unless patient has one or more of the following: on home nebulizer, not able to hold breath for 10 seconds, is not alert and oriented, cannot activate and use MDI correctly, or respiratory rate 25 breaths per minute or more, then use the equivalent nebulizer order(s) with same Frequency and PRN reasons based on the score. If a patient is on this medication at home then do not decrease Frequency below that used at home. 0-3  enter or revise RT bronchodilator order(s) to equivalent RT Bronchodilator order with Frequency of every 4 hours PRN for wheezing or increased work of breathing using Per Protocol order mode. 4-6  enter or revise RT Bronchodilator order(s) to two equivalent RT bronchodilator orders with one order with BID Frequency and one order with Frequency of every 4 hours PRN wheezing or increased work of breathing using Per Protocol order mode. 7-10  enter or revise RT Bronchodilator order(s) to two equivalent RT bronchodilator orders with one order with TID Frequency and one order with Frequency of every 4 hours PRN wheezing or increased work of breathing using Per Protocol order mode. 11-13  enter or revise RT Bronchodilator order(s) to one equivalent RT bronchodilator order with QID Frequency and an Albuterol order with Frequency of every 4 hours PRN wheezing or increased work of breathing using Per Protocol order mode. Greater than 13  enter or revise RT Bronchodilator order(s) to one equivalent RT bronchodilator order with every 4 hours Frequency and an Albuterol order with Frequency of every 2 hours PRN wheezing or increased work of breathing using Per Protocol order mode.        Electronically signed by Kenneth Arias RCP on 10/12/2021 at 8:27 PM

## 2021-10-13 NOTE — PROGRESS NOTES
Comprehensive Nutrition Assessment    Type and Reason for Visit:  Initial, Consult (consult for wounds)    Nutrition Recommendations/Plan:   1. Continue ADULT DIET; Regular; Low-Na diet order. 2. Added Ensure Enlive with meals - per past RD encounter, patient likes strawberry flavor. 3. Monitor appetite, meal intake, and acceptance/intake of ONS. 4. Please obtain an actual, current weight for this patient - only an estimated weight was obtained upon admission. 5. Monitor nutrition-related labs, bowel function, and weight trends. Nutrition Assessment:  patient is nutritionally compromised AEB recurrent admissions (4-5 admissions in Sept.-Oct. 2021) and chronic respiratory dysfunction + increased nutrition needs r/t COPD and she is at risk for further compromise d/t stage II wound on sacrum (per documentation) and hx of malnutrition; will continue ADULT DIET; Regular; Low-Na diet order + add Ensure Enlive/Plus with meals    Malnutrition Assessment:  Malnutrition Status:  Severe malnutrition (severe malnutrition in the context of chronic illness > 3 months based on 75% or less of estimated energy requirements for 1 month or longer, mild fat loss, and severe muscle wasting present during NFPE), per guidelines from Academy of Nutrition and Dietetics (Academy)/American Society for Parenteral and Enteral Nutrition (A.S.P.E.N.) - clinical characteristics that the clinician can  obtain and document to support a diagnosis of malnutrition.    Context:  Chronic Illness     Findings of the 6 clinical characteristics of malnutrition:  Energy Intake:  7 - 75% or less estimated energy requirements for 1 month or longer  Weight Loss:  Unable to assess (admission weight is an estimated weight)     Body Fat Loss:  1 - Mild body fat loss Orbital   Muscle Mass Loss:  7 - Severe muscle mass loss Calf (gastrocnemius), Thigh (quadraceps), Clavicles (pectoralis & deltoids), Temples (temporalis) (clavicle regions - mild wasting)  Fluid Accumulation:  No significant fluid accumulation     Strength:  Not Performed    Estimated Daily Nutrient Needs:  Energy (kcal):  1104 - 1200 kcals based on 23-25 kcals/kg/CBW; Weight Used for Energy Requirements:  Current (estimated weight of 105#)     Protein (g):  67 - 72 g protein based on 1.4-1.5 g/kg/CBW; Weight Used for Protein Requirements:  Current (estimated weight of 105#)        Fluid (ml/day):  1104 - 1200 ml; Method Used for Fluid Requirements:  1 ml/kcal      Nutrition Related Findings:  patient is A & O x 4; patient has had ~ 4-5 recurrent admissions in Sept.-Oct. 2021; abdomen is soft, non-tender, and bowel sounds are active; last BM was on 10/12/21; + upper dentures; h/h is low; patient has buspar, lipitor, plavix, lovenox, toradol, mag-ox, lopressor, bactroban, protonix, glycolax, prednisone, and D5 NS at 75 ml/hr ordered at this time      Wounds:  Pressure Injury, Stage II (stage II pressure wound on sacrum)       Current Nutrition Therapies:    ADULT DIET; Regular;  Low Sodium (2 gm)  Adult Oral Nutrition Supplement; Standard High Calorie/High Protein Oral Supplement    Anthropometric Measures:  · Height: 5' 1\" (154.9 cm)  · Current Body Weight: 105 lb (47.6 kg) (obtained on 10/12/21; estimated weight)   · Admission Body Weight: 105 lb (47.6 kg) (obtained on 10/12/21; estimated weight)    · Usual Body Weight: 107 lb 12.8 oz (48.9 kg) (obtained on 9/23/21; actual weight)     · Ideal Body Weight: 105 lbs; % Ideal Body Weight 100 %   · BMI: 19.8  · BMI Categories: Underweight (BMI less than 22) age over 72       Nutrition Diagnosis:   · Inadequate oral intake related to inadequate protein-energy intake, impaired respiratory function, increase demand for energy/nutrients as evidenced by poor intake prior to admission, wounds      Nutrition Interventions:   Food and/or Nutrient Delivery:  Continue Current Diet, Start Oral Nutrition Supplement  Nutrition Education/Counseling:  No recommendation at this time   Coordination of Nutrition Care:  Continue to monitor while inpatient, Interdisciplinary Rounds    Goals:  patient will accept and consume 50% or greater of her meals on ADULT DIET; Regular;  Low-Na diet order x 3 meals per day + she will accept and consume 50% or greater of Ensure Enlive/Plus with meals       Nutrition Monitoring and Evaluation:   Behavioral-Environmental Outcomes:  None Identified   Food/Nutrient Intake Outcomes:  Food and Nutrient Intake, Supplement Intake, IVF Intake  Physical Signs/Symptoms Outcomes:  Biochemical Data, Chewing or Swallowing, GI Status, Hemodynamic Status, Skin, Weight, Nutrition Focused Physical Findings     Discharge Planning:    Continue current diet, Continue Oral Nutrition Supplement     Electronically signed by Jaclyn Kim RD, LD on 10/13/21 at 10:10 AM EDT    Contact: [phone]

## 2021-10-13 NOTE — PROGRESS NOTES
Pulmonary Progress Note    CC: Chronic hypercarbic respiratory failure    Subjective:  Extubated yesterday and has tolerated Bipap    EXAM: BP (!) 166/96   Pulse 90   Temp 98.5 °F (36.9 °C) (Bladder)   Resp 21   Ht 5' 1\" (1.549 m)   Wt 105 lb (47.6 kg)   SpO2 100%   BMI 19.84 kg/m²  on 2L  Constitutional:  No acute distress. Eyes: PERRL. Conjunctivae anicteric. ENT: Normal nose. Normal tongue. Neck:  Trachea is midline. No thyroid tenderness. Respiratory: + accessory muscle usage. Decreased breath sounds. No wheezes. No rales. No Rhonchi. Cardiovascular: Normal S1S2. No digit clubbing. No digit cyanosis. No LE edema. Psychiatric: No anxiety or Agitation. Alert and Oriented to person, place and time.     Scheduled Meds:   busPIRone  10 mg Oral TID    predniSONE  30 mg Oral Daily    clopidogrel  75 mg Oral Daily    atorvastatin  20 mg Oral Nightly    magnesium oxide  400 mg Oral Daily    metoprolol tartrate  25 mg Oral BID    pantoprazole  20 mg Oral Daily    polyethylene glycol  17 g Oral BID    sodium chloride flush  5-40 mL IntraVENous 2 times per day    enoxaparin  40 mg SubCUTAneous Daily    ketorolac  15 mg IntraVENous Once    mupirocin   Nasal BID    ipratropium-albuterol  3 mL Inhalation 4x daily     Continuous Infusions:   sodium chloride      dextrose      dextrose 5 % and 0.9 % NaCl 75 mL/hr at 10/13/21 0025     PRN Meds:  ipratropium-albuterol, sodium chloride flush, sodium chloride, ondansetron **OR** ondansetron, polyethylene glycol, acetaminophen **OR** acetaminophen, glucose, dextrose, glucagon (rDNA), dextrose    Labs:  CBC:   Recent Labs     10/12/21  0236 10/13/21  0422   WBC 15.1* 7.4   HGB 10.5* 8.1*   HCT 34.7* 25.5*   MCV 94.7 90.9    180     BMP:   Recent Labs     10/12/21  0236 10/13/21  0422    140   K 4.7 3.6    106   CO2 26 26   PHOS  --  2.6   BUN 16 15   CREATININE 1.1 0.8       Cultures:    Chest imaging was reviewed by me and showed Scattered opacities are seen throughout the left and right lung, slightly   decreased compared to prior.  Superimposed pleural effusions are seen.         ASSESSMENT:  · Acute on chronic hypercarbic and hypoxic respiratory failure - probably secondary to inadequate use of trilogy avaps at her facility rather than a copd exacerbation  · End-stage COPD  · Pleural effusions and CHF related changes on CTPA     PLAN:  · Bipap nightly and with naps  · Duonebs  · Steroids - decrease to 30mg daily  · Lasix 20mg IV x 1  · Restart buspar and zoloft  · Lovenox DVT prophylaxis  · Ok to transfer

## 2021-10-13 NOTE — CONSULTS
[address] [facility] Wound Ostomy Continence Nurse  Consult Note       NAME:  Aura Leos [facility] LEELA RIVERA [facility]  MEDICAL RECORD NUMBER:  [id number]  AGE: [de-identified] y.o. GENDER: female  : 1940  TODAY'S DATE:  10/13/2021    Subjective  Pt alert and oriented in bed, anxious   Reason for WOCN Evaluation and Assessment: sacrum, shallow stage 3 pressure injury, present on admission    Tabitha Prakash is a [de-identified] y.o. female referred by:   [x] Physician  [] Nursing  [] Other:     Wound Identification:  Wound Type: pressure and MASD  Contributing Factors: chronic pressure, decreased mobility and incontinence of stool    Pt seen for wound care to the sacrum and buttocks. Granddaughter at bedside. Pt is anxious. Pt seen prior for fungal rash on previous admission.       Patient Goal of Care:  [x] Wound Healing  [] Odor Control  [] Palliative Care  [] Pain Control   [] Other:         PAST MEDICAL HISTORY        Diagnosis Date    [facility]DAJA [facility]) ([facility] [address]     Asthma     Chronic anxiety     COPD (chronic obstructive pulmonary disease) ([facility])     GERD (gastroesophageal reflux disease) 2021    Hyperlipidemia     Hypertension     MRSA (methicillin resistant staph aureus) culture positive 2019    + resp cx    OA (osteoarthritis) 2014       PAST SURGICAL HISTORY    Past Surgical History:   Procedure Laterality Date    BRONCHOSCOPY  2019    Dr. Darian Waite - w/BAL   [address]  2019    Dr. Alejandra Erwin N/A 2020    COLONOSCOPY DIAGNOSTIC performed by Jodie Medina DO at [address] N/A 2019    OFF PUMP CORONARY ARTERY BYPASS GRAFTING X2, INTERNAL MAMMARY ARTERY, SAPHENOUS VEIN GRAFT performed by Dakota Hines MD at [facility] CATH  2021    IR MIDLINE CATH [address] PROCEDURES    MASTECTOMY, PARTIAL Left     SIGMOIDOSCOPY  2020    4 bands    SIGMOIDOSCOPY N/A 2020 FLEX SIG W/ BANDING SIGMOIDOSCOPY DIAGNOSTIC FLEXIBLE performed by Vicki Blackman DO at [address]    Family History   Problem Relation Age of Onset    Cancer Mother     Heart Disease Father     Stroke Father     Heart Disease Sister     Stroke Sister        SOCIAL HISTORY    Social History     Tobacco Use    Smoking status: Former Smoker     Packs/day: 0.50     Years: 50.00     Pack years: 25.00     Types: Cigarettes     Quit date: 2019     Years since quittin.0    Smokeless tobacco: Never Used    Tobacco comment: advised to quit   Vaping Use    Vaping Use: Never assessed   Substance Use Topics    Alcohol use: No    Drug use: No       ALLERGIES    Allergies   Allergen Reactions    Latex      Burning      Codeine      \"hallucinations\"       MEDICATIONS    No current facility-administered medications on file prior to encounter. Current Outpatient Medications on File Prior to Encounter   Medication Sig Dispense Refill    busPIRone (BUSPAR) 10 MG tablet Take 10 mg by mouth 3 times daily Indications: Feeling Anxious      ondansetron (ZOFRAN-ODT) 4 MG disintegrating tablet Take 1 tablet by mouth every 8 hours as needed for Nausea or Vomiting      predniSONE (DELTASONE) 10 MG tablet Take 4 tabs a day for 5 days ,   then 3 tabs a day for 5 days,  Then 2 tabs a day for 5 days ,  Then 1 tab daily      furosemide (LASIX) 20 MG tablet Take 1 tablet by mouth See Admin Instructions Tuesday, friday      magnesium oxide (MAG-OX) 400 (241.3 Mg) MG TABS tablet Take 1 tablet by mouth daily      potassium & sodium phosphates (PHOS-NAK) 280-160-250 MG PACK Take 1 packet by mouth daily  0    menthol-zinc oxide (CALMOSEPTINE) 0.44-20.6 % OINT ointment Apply topically 2 times daily as needed (Apply to buttocks until resolved) Max 30 ml per day.  1 g 0    polyethylene glycol (GLYCOLAX) 17 g packet Take 17 g by mouth 2 times daily 527 g 0    melatonin 5 MG TBDP disintegrating tablet Take 1 tablet by mouth nightly as needed (sleep)      fluticasone (FLONASE) 50 MCG/ACT nasal spray 1 spray by Each Nostril route daily as needed for Rhinitis 16 g 0    pantoprazole (PROTONIX) 20 MG tablet Take 20 mg by mouth daily      ferrous sulfate (IRON 325) 325 (65 Fe) MG tablet Take 325 mg by mouth daily (with breakfast)      Roflumilast (DALIRESP) 250 MCG tablet Take 250 mcg by mouth daily      acetaminophen (TYLENOL) 500 MG tablet Take 500 mg by mouth every 6 hours as needed for Pain      sertraline (ZOLOFT) 50 MG tablet Take 50 mg by mouth daily      budesonide (PULMICORT) 0.5 MG/2ML nebulizer suspension Take 2 mLs by nebulization 2 times daily 60 ampule 3    ipratropium-albuterol (DUONEB) 0.5-2.5 (3) MG/3ML SOLN nebulizer solution Inhale 3 mLs into the lungs every 6 hours 360 mL 0    atorvastatin (LIPITOR) 20 MG tablet Take 1 tablet by mouth nightly 30 tablet 3    metoprolol tartrate (LOPRESSOR) 25 MG tablet Take 1 tablet by mouth 2 times daily 60 tablet 3    docusate sodium (COLACE, DULCOLAX) 100 MG CAPS Take 100 mg by mouth 2 times daily 30 capsule 0    clopidogrel (PLAVIX) 75 MG tablet Take 1 tablet by mouth daily 30 tablet 3    tiotropium (SPIRIVA HANDIHALER) 18 MCG inhalation capsule Inhale 18 mcg into the lungs daily      budesonide-formoterol (SYMBICORT) 160-4.5 MCG/ACT AERO Inhale 2 puffs into the lungs 2 times daily      albuterol (PROVENTIL HFA) 108 (90 BASE) MCG/ACT inhaler Inhale 2 puffs into the lungs every 6 hours as needed for Wheezing or Shortness of Breath.  1 Inhaler 2       Objective    BP (!) 166/84   Pulse 104   Temp 98.5 °F (36.9 °C) (Oral)   Resp 22   Ht 5' 1\" (1.549 m)   Wt 105 lb (47.6 kg)   SpO2 98%   BMI 19.84 kg/m²     LABS:  WBC:    Lab Results   Component Value Date    WBC 7.4 10/13/2021     H/H:    Lab Results   Component Value Date    HGB 8.1 10/13/2021    HCT 25.5 10/13/2021     PTT:    Lab Results   Component Value Date    APTT 30.4 09/28/2021   [APTT}  PT/INR:    Lab Results   Component Value Date    PROTIME 9.5 09/28/2021    INR 0.85 09/28/2021     HgBA1c:    Lab Results   Component Value Date    LABA1C 5.0 09/15/2021       Assessment   Trey Risk Score: Trey Scale Score: 13    Patient Active Problem List   Diagnosis Code    Hyperlipidemia E78.5    Asthma J45.909    OA (osteoarthritis) M19.90    Tobacco use Z72.0    COPD exacerbation ([facility] [address] J44.1    Sepsis ([facility] A41.9    Abnormal chest x-ray R93.89    COPD with acute exacerbation ([facility] J44.1    Shortness of breath R06.02    Tobacco abuse Z72.0    Moderate malnutrition ([facility]) E44.0    NSTEMI (non-ST elevated myocardial infarction) ([facility] I21.4    Elevated troponin R77.8    Acute respiratory failure with hypoxia ([facility]) J96.01    CAD (coronary artery disease) I25.10    Acute pulmonary edema ([facility]) J81.0    Pulmonary infiltrate R91.8    Elevated brain natriuretic peptide (BNP) level R79.89    Leukocytosis D72.829    Ischemic cardiomyopathy I25.5    Acute combined systolic and diastolic congestive heart failure ([facility]) I50.41    Hypernatremia E87.0    NADJA (acute kidney injury) ([facility] N17.9    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    Mucus plugging of bronchi T17.500A    CAD in native artery I25.10    S/P CABG (coronary artery bypass graft) Z95.1    Pneumonia of both lower lobes due to methicillin resistant Staphylococcus aureus (MRSA) ([facility]) J15.212    GI bleed K92.2    Severe malnutrition ([facility]) E43    Chest pain R07.9    Chronic respiratory failure with hypoxia ([facility]) J96.11    Essential hypertension I10    Anemia D64.9    Acute respiratory failure ([facility]) J96.00    Acute respiratory distress R06.03    Volume overload E87.70    Demand ischemia ([facility]) I24.8    GERD (gastroesophageal reflux disease) K21.9    Acute respiratory failure with hypoxia and hypercapnia ([facility]) J96.01, J96.02    Shock ([facility]) R57.9    Pneumonia due to infectious organism J18.9    Acute on chronic respiratory failure with hypoxia and hypercapnia (HCC) J96.21, J96.22    Chronic obstructive pulmonary disease (HCC) J44.9    Acute on chronic respiratory failure with hypoxemia (HCC) J96.21    Chronic anxiety F41.9    Congestive heart failure (HCC) I50.9    Acute respiratory failure with hypoxia and hypercarbia (HCC) J96.01, J96.02    Acute kidney injury (HCC) N17.9    Elevated procalcitonin R79.89    COPD, severe (HCC) J44.9    Anxiety F41.9    Severe anxiety F41.9    Severe protein-calorie malnutrition (HCC) E43       sacrum:  3x2x0.1cm, 10% yellow in base, 90% pink moist tissue. periuwound ski8n with macuialr red rash with red satellite lesions consistent with yeast.  Old foam dressing soiled with stool, removed. Wound cleansed and patted dry. AF moisture barrier applied. Response to treatment:  Well tolerated by patient. Pain Assessment:  Severity:  0 / 10  Quality of pain: N/A  Wound Pain Timing/Severity: none  Premedicated: N/A    Plan  No foam dressing. Apply antifungal moisture barrier twice daily ad prn         Specialty Bed Required : no   [] Low Air Loss   [] Pressure Redistribution  [] Fluid Immersion  [] Bariatric  [] Total Pressure Relief  [] Other:     Current Diet: ADULT DIET; Regular;  Low Sodium (2 gm)  Adult Oral Nutrition Supplement; Standard High Calorie/High Protein Oral Supplement  Dietician consult:  Yes    Discharge Plan:  Placement for patient upon discharge: skilled nursing    Patient appropriate for Outpatient [address]: No    Referrals:  [x]   [] [facility]  [] Supplies  [] Other    Patient/Caregiver Teaching:  Level of patient/caregiver understanding able to:   [] Indicates understanding       [x] Needs reinforcement  [] Unsuccessful      [x] Verbal Understanding  [] Demonstrated understanding       [] No evidence of learning  [] Refused teaching         [] N/A       Electronically signed by Doris Talbert Jesus Cruz RN, Ryan Gaffney on 10/13/2021 at 5:26 PM

## 2021-10-13 NOTE — PROGRESS NOTES
Admit: 10/12/2021    Name:  Jodi Elias  Room:  3014/3014-01  MRN:    [id number]    Critical Care Daily Progress Note for 10/13/2021     Admitted with acute on chronic respiratory failure and acute COPD  Intubated. Interval History:   Extubated yesterday. Was on BiPAP last night. Currently on 3 L of oxygen    Scheduled Meds:   busPIRone  10 mg Oral TID    predniSONE  30 mg Oral Daily    sertraline  50 mg Oral Daily    clopidogrel  75 mg Oral Daily    atorvastatin  20 mg Oral Nightly    magnesium oxide  400 mg Oral Daily    metoprolol tartrate  25 mg Oral BID    pantoprazole  20 mg Oral Daily    polyethylene glycol  17 g Oral BID    sodium chloride flush  5-40 mL IntraVENous 2 times per day    enoxaparin  40 mg SubCUTAneous Daily    ketorolac  15 mg IntraVENous Once    mupirocin   Nasal BID    ipratropium-albuterol  3 mL Inhalation 4x daily       Continuous Infusions:   sodium chloride      dextrose         PRN Meds:  ipratropium-albuterol, sodium chloride flush, sodium chloride, ondansetron **OR** ondansetron, polyethylene glycol, acetaminophen **OR** acetaminophen, glucose, dextrose, glucagon (rDNA), dextrose                  Objective:     Temp  Av.8 °F (37.1 °C)  Min: 98.4 °F (36.9 °C)  Max: 99.3 °F (37.4 °C)  Pulse  Av.2  Min: 64  Max: 117  BP  Min: 86/55  Max: 185/79  SpO2  Av %  Min: 94 %  Max: 100 %  FiO2   Av.1 %  Min: 21 %  Max: 35 %  Patient Vitals for the past 4 hrs:   BP Temp Temp src Pulse Resp SpO2   10/13/21 1300 (!) 174/95   95 25 98 %   10/13/21 1200 (!) 166/116   106 30 97 %   10/13/21 1100 (!) 166/99 99.3 °F (37.4 °C) Bladder 111 22 94 %         Intake/Output Summary (Last 24 hours) at 10/13/2021 1415  Last data filed at 10/13/2021 0430  Gross per 24 hour   Intake 313.19 ml   Output 1250 ml   Net -936.81 ml       Physical Exam:  Gen:  Awake and alert and responding appropriately.    Anxious. Thin built. Chronically ill-appearing  Eyes: PERRL.  No sclera icterus. No conjunctival injection. ENT: No discharge. Pharynx clear. Neck: Trachea midline. Normal thyroid. Resp: No accessory muscle use.  Very diminished breath sounds,  no crackles. No wheezes. No rhonchi. No dullness on percussion. CV: Regular rate. Regular rhythm. No murmur or rub. No edema. GI: Non-tender. Non-distended. No masses. No organomegaly. Normal bowel sounds. No hernia. Skin: Warm and dry. No nodule on exposed extremities. No rash on exposed extremities. Lymph: No cervical LAD. No supraclavicular LAD. M/S: No cyanosis. No joint deformity. No clubbing. Intact peripheral pulses. Brisk cap refill, < 2 secs  Neuro: Awake. Psych: Oriented x 3. + anxiety   Lab Data:  CBC:   Recent Labs     10/12/21  0236 10/13/21  0422   WBC 15.1* 7.4   RBC 3.66* 2.80*   HGB 10.5* 8.1*   HCT 34.7* 25.5*   MCV 94.7 90.9   RDW 21.4* 21.2*    180     BMP:   Recent Labs     10/12/21  0236 10/13/21  0422    140   K 4.7 3.6    106   CO2 26 26   PHOS  --  2.6   BUN 16 15   CREATININE 1.1 0.8     BNP: No results for input(s): BNP in the last 72 hours. PT/INR: No results for input(s): PROTIME, INR in the last 72 hours. APTT:No results for input(s): APTT in the last 72 hours. CARDIAC ENZYMES:   Recent Labs     10/12/21  0236   TROPONINI 0.06*     FASTING LIPID PANEL:  Lab Results   Component Value Date    CHOL 146 11/14/2019    HDL 63 11/14/2019    TRIG 110 11/14/2019     LIVER PROFILE:   Recent Labs     10/12/21  0236   AST 77*   ALT 39   BILITOT 0.3   ALKPHOS 132*         XR CHEST PORTABLE   Final Result   Tip of NG tube is at the GE junction. Consider further advancement by   approximately 8-9 cm      Slight improved aeration of the lungs. Bilateral pleuroparenchymal disease   remains. CT CHEST PULMONARY EMBOLISM W CONTRAST   Final Result   1. Enteric tube distal tip at the GE junction.   Advancement is recommended so   that the NG tube is appropriately positioned and side-port/distal tip are   located beyond the GE junction. 2. No clear evidence for pulmonary embolus. 3. Moderate bilateral pleural effusions. Interlobular septal thickening   throughout both lungs with respiratory motion. Compressive atelectasis in   the posterior aspect of both lungs. Cardiomegaly. Left atrial enlargement. Findings can be seen with CHF-related changes. Follow-up is recommended to   document resolution. 4. Adrenal hyperplasia. 5. Atherosclerotic calcification of the aorta and branch vasculature. Coronary artery disease. 6. Prior median sternotomy. XR CHEST PORTABLE   Final Result   1. The endotracheal tube tip is approximately 3 cm above the brayden. 2. Enteric tube remains in the esophagus. The tube should be advanced 10 cm. 3. Right lung pneumonia. 4. Left basilar atelectasis or pneumonia. 5. Suspect pulmonary edema superimposed on chronic interstitial lung disease.          XR CHEST 1 VIEW    (Results Pending)         Assessment & Plan:     Patient Active Problem List    Diagnosis Date Noted    Severe anxiety     Severe protein-calorie malnutrition (HCC)     COPD, severe (HCC)     Anxiety     Acute respiratory failure with hypoxia and hypercarbia (HCC) 09/28/2021    Acute kidney injury ([address]     Elevated procalcitonin     Congestive heart failure (HCC)     Chronic anxiety     Acute on chronic respiratory failure with hypoxemia (HCC) 09/23/2021    Chronic obstructive pulmonary disease (HCC)     Acute on chronic respiratory failure with hypoxia and hypercapnia (HCC)     Acute respiratory failure with hypoxia and hypercapnia ([address] 09/16/2021    Shock ([address]     Pneumonia due to infectious organism     Acute respiratory distress 09/09/2021    Volume overload 09/09/2021    Demand ischemia ([address] 09/09/2021    GERD (gastroesophageal reflux disease) 09/09/2021    Severe malnutrition ([address] 08/31/2021    Acute respiratory failure ([address] 08/30/2021    Chronic respiratory failure with hypoxia (HCC)     Essential hypertension     Anemia     Chest pain 09/18/2020    GI bleed 02/23/2020    Moderate malnutrition ([facility] 09/25/2019    S/P CABG (coronary artery bypass graft) 09/25/2019    Pneumonia of both lower lobes due to methicillin resistant Staphylococcus aureus (MRSA) ([facility] 09/25/2019    CAD in native artery 09/24/2019    Mucus plugging of bronchi     Status post aorto-coronary artery bypass graft     NADJA (acute kidney injury) ([address]     Hypernatremia 09/14/2019    Ischemic cardiomyopathy     Acute combined systolic and diastolic congestive heart failure (HCC)     Acute respiratory failure with hypoxia ([facility] 09/08/2019    CAD (coronary artery disease) 09/08/2019    Acute pulmonary edema ([facility] 09/08/2019    Pulmonary infiltrate 09/08/2019    Elevated brain natriuretic peptide (BNP) level 09/08/2019    Leukocytosis 09/08/2019    Elevated troponin 09/04/2019    NSTEMI (non-ST elevated myocardial infarction) ([facility] 09/03/2019    Abnormal chest x-ray     COPD with acute exacerbation (HCC)     Shortness of breath     Tobacco abuse     Sepsis ([facility] 03/18/2019    COPD exacerbation ([facility] 12/29/2017    OA (osteoarthritis) 08/12/2014    Tobacco use 08/12/2014    Hyperlipidemia 05/07/2013    Asthma 05/07/2013     #Acute on chronic hypoxic and hypercarbic respiratory failure  #Advanced end-stage COPD  -Recurrent admissions for acute respiratory failure in the setting of severe anxiety . Brought in from the nursing home and obtunded state, reportedly after getting Ativan for anxiety  - intubated in ER,  Admitted  to ICU. Seen by intensivist  -Patient is currently stable on mechanical ventilation, blood gases are stable. Extubated to BiPAP yesterday. Was on BiPAP last night.   Currently on 3 L of oxygen.     #Severe advanced COPD with AE  - steroids  - duonebs  Resume BiPAP     #Chronic anxiety  #Panic attacks  -Patient was given BuSpar for

## 2021-10-14 ENCOUNTER — APPOINTMENT (OUTPATIENT)
Dept: GENERAL RADIOLOGY | Age: 81
DRG: 208 | End: 2021-10-14
Payer: MEDICARE

## 2021-10-14 VITALS
SYSTOLIC BLOOD PRESSURE: 122 MMHG | WEIGHT: 106.2 LBS | HEIGHT: 61 IN | OXYGEN SATURATION: 96 % | HEART RATE: 92 BPM | BODY MASS INDEX: 20.05 KG/M2 | TEMPERATURE: 97 F | DIASTOLIC BLOOD PRESSURE: 74 MMHG | RESPIRATION RATE: 16 BRPM

## 2021-10-14 LAB
GLUCOSE BLD-MCNC: 118 MG/DL (ref 70–99)
PERFORMED ON: ABNORMAL
SARS-COV-2, NAAT: NOT DETECTED

## 2021-10-14 PROCEDURE — 99239 HOSP IP/OBS DSCHRG MGMT >30: CPT | Performed by: INTERNAL MEDICINE

## 2021-10-14 PROCEDURE — 6370000000 HC RX 637 (ALT 250 FOR IP): Performed by: INTERNAL MEDICINE

## 2021-10-14 PROCEDURE — 97166 OT EVAL MOD COMPLEX 45 MIN: CPT

## 2021-10-14 PROCEDURE — 6360000002 HC RX W HCPCS: Performed by: INTERNAL MEDICINE

## 2021-10-14 PROCEDURE — 6370000000 HC RX 637 (ALT 250 FOR IP): Performed by: NURSE PRACTITIONER

## 2021-10-14 PROCEDURE — 90686 IIV4 VACC NO PRSV 0.5 ML IM: CPT | Performed by: INTERNAL MEDICINE

## 2021-10-14 PROCEDURE — 94660 CPAP INITIATION&MGMT: CPT

## 2021-10-14 PROCEDURE — G0008 ADMIN INFLUENZA VIRUS VAC: HCPCS | Performed by: INTERNAL MEDICINE

## 2021-10-14 PROCEDURE — 87635 SARS-COV-2 COVID-19 AMP PRB: CPT

## 2021-10-14 PROCEDURE — 94761 N-INVAS EAR/PLS OXIMETRY MLT: CPT

## 2021-10-14 PROCEDURE — 99233 SBSQ HOSP IP/OBS HIGH 50: CPT | Performed by: INTERNAL MEDICINE

## 2021-10-14 PROCEDURE — 97530 THERAPEUTIC ACTIVITIES: CPT

## 2021-10-14 PROCEDURE — 94640 AIRWAY INHALATION TREATMENT: CPT

## 2021-10-14 PROCEDURE — 71045 X-RAY EXAM CHEST 1 VIEW: CPT

## 2021-10-14 PROCEDURE — 2700000000 HC OXYGEN THERAPY PER DAY

## 2021-10-14 PROCEDURE — 2580000003 HC RX 258: Performed by: INTERNAL MEDICINE

## 2021-10-14 RX ORDER — LORAZEPAM 2 MG/ML
0.5 INJECTION INTRAMUSCULAR ONCE
Status: COMPLETED | OUTPATIENT
Start: 2021-10-14 | End: 2021-10-14

## 2021-10-14 RX ORDER — PREDNISONE 10 MG/1
TABLET ORAL
Qty: 25 TABLET | Refills: 0 | Status: ON HOLD
Start: 2021-10-14 | End: 2022-03-30

## 2021-10-14 RX ADMIN — INFLUENZA A VIRUS A/VICTORIA/2570/2019 IVR-215 (H1N1) ANTIGEN (PROPIOLACTONE INACTIVATED), INFLUENZA A VIRUS A/CAMBODIA/E0826360/2020 IVR-224 (H3N2) ANTIGEN (PROPIOLACTONE INACTIVATED), INFLUENZA B VIRUS B/VICTORIA/705/2018 BVR-11 ANTIGEN (PROPIOLACTONE INACTIVATED), INFLUENZA B VIRUS B/PHUKET/3073/2013 BVR-1B ANTIGEN (PROPIOLACTONE INACTIVATED) 0.5 ML: 15; 15; 15; 15 INJECTION, SUSPENSION INTRAMUSCULAR at 15:10

## 2021-10-14 RX ADMIN — IPRATROPIUM BROMIDE AND ALBUTEROL SULFATE 3 ML: 2.5; .5 SOLUTION RESPIRATORY (INHALATION) at 06:53

## 2021-10-14 RX ADMIN — ACETAMINOPHEN 650 MG: 325 TABLET ORAL at 04:55

## 2021-10-14 RX ADMIN — MICONAZOLE NITRATE: 2 OINTMENT TOPICAL at 09:45

## 2021-10-14 RX ADMIN — SODIUM CHLORIDE, PRESERVATIVE FREE 10 ML: 5 INJECTION INTRAVENOUS at 09:46

## 2021-10-14 RX ADMIN — ENOXAPARIN SODIUM 40 MG: 40 INJECTION SUBCUTANEOUS at 08:23

## 2021-10-14 RX ADMIN — CLOPIDOGREL BISULFATE 75 MG: 75 TABLET ORAL at 08:22

## 2021-10-14 RX ADMIN — LORAZEPAM 0.5 MG: 2 INJECTION INTRAMUSCULAR; INTRAVENOUS at 15:34

## 2021-10-14 RX ADMIN — IPRATROPIUM BROMIDE AND ALBUTEROL SULFATE 3 ML: 2.5; .5 SOLUTION RESPIRATORY (INHALATION) at 14:29

## 2021-10-14 RX ADMIN — BUSPIRONE HYDROCHLORIDE 10 MG: 10 TABLET ORAL at 08:22

## 2021-10-14 RX ADMIN — ONDANSETRON HYDROCHLORIDE 4 MG: 2 INJECTION, SOLUTION INTRAMUSCULAR; INTRAVENOUS at 13:28

## 2021-10-14 RX ADMIN — SERTRALINE HYDROCHLORIDE 50 MG: 50 TABLET ORAL at 08:22

## 2021-10-14 RX ADMIN — METOPROLOL TARTRATE 25 MG: 25 TABLET, FILM COATED ORAL at 08:21

## 2021-10-14 RX ADMIN — IPRATROPIUM BROMIDE AND ALBUTEROL SULFATE 3 ML: 2.5; .5 SOLUTION RESPIRATORY (INHALATION) at 10:48

## 2021-10-14 RX ADMIN — PANTOPRAZOLE SODIUM 20 MG: 20 TABLET, DELAYED RELEASE ORAL at 08:22

## 2021-10-14 RX ADMIN — PREDNISONE 30 MG: 20 TABLET ORAL at 08:22

## 2021-10-14 RX ADMIN — MAGNESIUM GLUCONATE 500 MG ORAL TABLET 400 MG: 500 TABLET ORAL at 08:22

## 2021-10-14 RX ADMIN — MUPIROCIN: 20 OINTMENT TOPICAL at 09:46

## 2021-10-14 ASSESSMENT — PAIN DESCRIPTION - FREQUENCY: FREQUENCY: INTERMITTENT

## 2021-10-14 ASSESSMENT — PAIN DESCRIPTION - PROGRESSION: CLINICAL_PROGRESSION: NOT CHANGED

## 2021-10-14 ASSESSMENT — PAIN - FUNCTIONAL ASSESSMENT: PAIN_FUNCTIONAL_ASSESSMENT: ACTIVITIES ARE NOT PREVENTED

## 2021-10-14 ASSESSMENT — PAIN DESCRIPTION - ONSET: ONSET: GRADUAL

## 2021-10-14 ASSESSMENT — PAIN DESCRIPTION - PAIN TYPE: TYPE: ACUTE PAIN

## 2021-10-14 ASSESSMENT — PAIN DESCRIPTION - LOCATION: LOCATION: HEAD

## 2021-10-14 ASSESSMENT — PAIN SCALES - GENERAL: PAINLEVEL_OUTOF10: 9

## 2021-10-14 ASSESSMENT — PAIN DESCRIPTION - DESCRIPTORS: DESCRIPTORS: ACHING

## 2021-10-14 ASSESSMENT — PAIN DESCRIPTION - ORIENTATION: ORIENTATION: UPPER

## 2021-10-14 NOTE — PROGRESS NOTES
Admit: 10/12/2021    Name:  Magda Dailey  Room:  /9033-25  MRN:    [id number]    Daily Progress Note for 10/14/2021     Admitted with acute on chronic respiratory failure and acute COPD  Intubated. Interval History:   Extubated 10/12  Was on BiPAP last night. Currently on 2 L of oxygen    Scheduled Meds:   busPIRone  10 mg Oral TID    predniSONE  30 mg Oral Daily    sertraline  50 mg Oral Daily    miconazole nitrate   Topical BID    clopidogrel  75 mg Oral Daily    atorvastatin  20 mg Oral Nightly    magnesium oxide  400 mg Oral Daily    metoprolol tartrate  25 mg Oral BID    pantoprazole  20 mg Oral Daily    polyethylene glycol  17 g Oral BID    sodium chloride flush  5-40 mL IntraVENous 2 times per day    enoxaparin  40 mg SubCUTAneous Daily    ketorolac  15 mg IntraVENous Once    mupirocin   Nasal BID    ipratropium-albuterol  3 mL Inhalation 4x daily       Continuous Infusions:   sodium chloride      dextrose         PRN Meds:  ipratropium-albuterol, sodium chloride flush, sodium chloride, ondansetron **OR** ondansetron, polyethylene glycol, acetaminophen **OR** acetaminophen, glucose, dextrose, glucagon (rDNA), dextrose                  Objective:     Temp  Av.7 °F (36.5 °C)  Min: 96.7 °F (35.9 °C)  Max: 99.3 °F (37.4 °C)  Pulse  Av.1  Min: 80  Max: 125  BP  Min: 131/83  Max: 189/131  SpO2  Av.9 %  Min: 92 %  Max: 100 %  FiO2   Av %  Min: 30 %  Max: 30 %  Patient Vitals for the past 4 hrs:   BP Temp Temp src Pulse Resp SpO2   10/14/21 0702 136/75 97 °F (36.1 °C) Oral 87 16 95 %   10/14/21 0653      92 %         Intake/Output Summary (Last 24 hours) at 10/14/2021 0844  Last data filed at 10/14/2021 0515  Gross per 24 hour   Intake 393.54 ml   Output 3325 ml   Net -2931.46 ml       Physical Exam:  Gen:  Awake and alert and responding appropriately.    Anxious. Thin built. Chronically ill-appearing  Eyes: PERRL. No sclera icterus. No conjunctival injection. ENT: No discharge. Pharynx clear. Neck: Trachea midline. Normal thyroid. Resp: + accessory muscle use.  Very diminished breath sounds,  no crackles. No wheezes. No rhonchi. No dullness on percussion. CV: Regular rate. Regular rhythm. No murmur or rub. No edema. GI: Non-tender. Non-distended. No masses. No organomegaly. Normal bowel sounds. No hernia. Skin: Warm and dry. No nodule on exposed extremities. No rash on exposed extremities. Lymph: No cervical LAD. No supraclavicular LAD. M/S: No cyanosis. No joint deformity. No clubbing. Intact peripheral pulses. Brisk cap refill, < 2 secs  Neuro: Awake. Psych: Oriented x 3. + anxiety   Lab Data:  CBC:   Recent Labs     10/12/21  0236 10/13/21  0422   WBC 15.1* 7.4   RBC 3.66* 2.80*   HGB 10.5* 8.1*   HCT 34.7* 25.5*   MCV 94.7 90.9   RDW 21.4* 21.2*    180     BMP:   Recent Labs     10/12/21  0236 10/13/21  0422    140   K 4.7 3.6    106   CO2 26 26   PHOS  --  2.6   BUN 16 15   CREATININE 1.1 0.8     BNP: No results for input(s): BNP in the last 72 hours. PT/INR: No results for input(s): PROTIME, INR in the last 72 hours. APTT:No results for input(s): APTT in the last 72 hours. CARDIAC ENZYMES:   Recent Labs     10/12/21  0236   TROPONINI 0.06*     FASTING LIPID PANEL:  Lab Results   Component Value Date    CHOL 146 11/14/2019    HDL 63 11/14/2019    TRIG 110 11/14/2019     LIVER PROFILE:   Recent Labs     10/12/21  0236   AST 77*   ALT 39   BILITOT 0.3   ALKPHOS 132*         XR CHEST PORTABLE   Final Result   Tip of NG tube is at the GE junction. Consider further advancement by   approximately 8-9 cm      Slight improved aeration of the lungs. Bilateral pleuroparenchymal disease   remains. CT CHEST PULMONARY EMBOLISM W CONTRAST   Final Result   1. Enteric tube distal tip at the GE junction.   Advancement is recommended so   that the NG tube is appropriately positioned and side-port/distal tip are   located beyond the GE junction. 2. No clear evidence for pulmonary embolus. 3. Moderate bilateral pleural effusions. Interlobular septal thickening   throughout both lungs with respiratory motion. Compressive atelectasis in   the posterior aspect of both lungs. Cardiomegaly. Left atrial enlargement. Findings can be seen with CHF-related changes. Follow-up is recommended to   document resolution. 4. Adrenal hyperplasia. 5. Atherosclerotic calcification of the aorta and branch vasculature. Coronary artery disease. 6. Prior median sternotomy. XR CHEST PORTABLE   Final Result   1. The endotracheal tube tip is approximately 3 cm above the brayden. 2. Enteric tube remains in the esophagus. The tube should be advanced 10 cm. 3. Right lung pneumonia. 4. Left basilar atelectasis or pneumonia. 5. Suspect pulmonary edema superimposed on chronic interstitial lung disease.          XR CHEST PORTABLE    (Results Pending)         Assessment & Plan:     Patient Active Problem List    Diagnosis Date Noted    Severe anxiety     Severe protein-calorie malnutrition (HCC)     COPD, severe (HCC)     Anxiety     Acute respiratory failure with hypoxia and hypercarbia (HCC) 09/28/2021    Acute kidney injury ([address]     Elevated procalcitonin     Congestive heart failure (HCC)     Chronic anxiety     Acute on chronic respiratory failure with hypoxemia (HCC) 09/23/2021    Chronic obstructive pulmonary disease (HCC)     Acute on chronic respiratory failure with hypoxia and hypercapnia (HCC)     Acute respiratory failure with hypoxia and hypercapnia (HCC) 09/16/2021    Shock ([address]     Pneumonia due to infectious organism     Acute respiratory distress 09/09/2021    Volume overload 09/09/2021    Demand ischemia ([address] 09/09/2021    GERD (gastroesophageal reflux disease) 09/09/2021    Severe malnutrition ([address] 08/31/2021    Acute respiratory failure ([address] 08/30/2021    Chronic respiratory failure with hypoxia ([address]  Essential hypertension     Anemia     Chest pain 09/18/2020    GI bleed 02/23/2020    Moderate malnutrition ([facility] 09/25/2019    S/P CABG (coronary artery bypass graft) 09/25/2019    Pneumonia of both lower lobes due to methicillin resistant Staphylococcus aureus (MRSA) ([address] 09/25/2019    CAD in native artery 09/24/2019    Mucus plugging of bronchi     Status post aorto-coronary artery bypass graft     NADJA (acute kidney injury) ([address]     Hypernatremia 09/14/2019    Ischemic cardiomyopathy     Acute combined systolic and diastolic congestive heart failure (HCC)     Acute respiratory failure with hypoxia ([address] 09/08/2019    CAD (coronary artery disease) 09/08/2019    Acute pulmonary edema ([facility] 09/08/2019    Pulmonary infiltrate 09/08/2019    Elevated brain natriuretic peptide (BNP) level 09/08/2019    Leukocytosis 09/08/2019    Elevated troponin 09/04/2019    NSTEMI (non-ST elevated myocardial infarction) ([address] 09/03/2019    Abnormal chest x-ray     COPD with acute exacerbation (HCC)     Shortness of breath     Tobacco abuse     Sepsis ([facility] 03/18/2019    COPD exacerbation ([facility] 12/29/2017    OA (osteoarthritis) 08/12/2014    Tobacco use 08/12/2014    Hyperlipidemia 05/07/2013    Asthma 05/07/2013     #Acute on chronic hypoxic and hypercarbic respiratory failure  #Advanced end-stage COPD  -Recurrent admissions for acute respiratory failure in the setting of severe anxiety . Brought in from the nursing home and obtunded state, reportedly after getting Ativan for anxiety  - intubated in ER,  Admitted  to ICU. Seen by intensivist  -Patient is currently stable on mechanical ventilation, blood gases are stable. Extubated to BiPAP yesterday. Was on BiPAP last night. Currently on 2 L of oxygen.   She has a trilogy unit which he uses at night and her nursing home.     #Severe advanced COPD with AE  - steroids  - duonebs  Resume BiPAP     #Chronic anxiety  #Panic attacks  -Patient was given BuSpar for anxiety in the past this has not helped her . during her last visit to the hospital .  Give her low-dose Ativan which helped her anxiety .   -Continue Zoloft  Patient has been in the nursing home for the last 10 days .     #Lactic acidosis  -In the setting of respiratory failure.    Lactic acid levels are improving now     #CAD  #HTN  - cont lopressor, statin, plavix     #GERD  - cont PPI     #Severe malnutrition  #Generalized weakness and debility        DVT Prophylaxis: Lovenox   Diet: general  Code Status: Full Code    Ot/pt    Dc planning     Baron Ceja MD

## 2021-10-14 NOTE — PROGRESS NOTES
Inpatient Physical Therapy Evaluation and Treatment    Unit: PCU  Date:  10/14/2021  Patient Name:    Tiffani Osorio  Admitting diagnosis:  Acute respiratory failure ([facility] [J96.00]  COPD exacerbation ([facility] [J44.1]  Acute respiratory failure with hypoxia and hypercapnia ([facility] [J96.01, J96.02]  Admit Date:  10/12/2021  Precautions/Restrictions/WB Status/ Lines/ Wounds/ Oxygen: Fall risk, Bed/chair alarm, Lines -Supplemental O2 (2.5L) and Bill catheter, [place] (hard of hearing), Telemetry and Continuous pulse oximetry  Sacral shallow stage 3 pressure injury    Treatment Time: 5168-6092  Treatment Number:  1   Timed Code Treatment Minutes: 25 minutes  Total Treatment Minutes:  35  minutes    Patient Goals for Therapy: \"sit up in the chair\"      Discharge Recommendations: SNF  DME needs for discharge: defer to facility       Therapy recommendation for EMS Transport: requires transport by cot due to limited/unreliable tolerance to sustained sitting posture    Therapy recommendations for staff:   Assist of 1 with use of handhold assist for all transfers to/from [facility]  to/from [facility] via stand pivot transfer    History of Present Illness: Per Dr. Ernie Martínez H&P 10/12:   \"The patient is a [de-identified] y.o. female with severe end-stage COPD, severe anxiety disorder , malnutrition , CAD, HTN, mood DO, GERD who presented to [facility] ED from nursing home in respiratory distress. Reportedly given ativan at NH for anxiety, subsequently became obtunded and sent to ER for eval.  She was intubated by ER provider and admitted to the ICU. Patient has had multiple recurrent admissions for acute on chronic hypoxic hypercapnic respiratory failure. Recurrent issues with anxiety. She has a trilogy unit in the nursing home. \"  Extubated 10/12 in the ICU. Home Health S4 Level Recommendation:  NA  AM-PAC Mobility Score       AM-PAC Inpatient Mobility without Stair Climbing Raw Score : 16    Preadmission Environment    Pt has been at [facility] P[facility].  Several re admissions to hospital recently. Dea kaye (Son who works just across the [employer] [place])  Home environment:    one story home  Steps to enter first floor: 1 steps to enter and no handrail  Steps to second floor: N/A  Bathroom: tub/shower unit, and standard height commode,tub transfer bench  Equipment owned: SW, wc (manual), lift chair, hospital bed  (doesn't go up and down), home O2 (2L) continous and lifealert     Preadmission Status:  Pt. Able to drive: No  Pt Fully independent with ADLs: No  Pt. Required assistance from family for: Cleaning, Cooking and [address]  Pt. independent for transfers and gait and walked with No Device  History of falls No    Pain   No  Location: N/A  Rating: NA /10  Pain Medicine Status: No request made    Cognition    A&O Person , Pt stated it was Oct however could name specific date and could not state the Yr   Able to follow 2 step commands    Subjective  Patient lying supine in bed with family at bedside. Pt agreeable to this PT eval & tx. Pt and pt's daughter inquire about what they initially described as pain in bilat hips, but then clarified as weakness. They say it started after CVA in August.     Upper Extremity ROM/Strength  Please see OT evaluation. Lower Extremity ROM / Strength   AROM WFL: Yes    BLE strength impaired, but not formally assessed with MMT. Lower Extremity Sensation    WFL    Lower Extremity Proprioception:   NT    Coordination and Tone  NT      Balance  Sitting:  Good - ; Supervision  Comments: at EOB ~10 minutes during examination and vitals assessment. Pt sits with stiff UE  on EOB or armrests in order to use accessory breathing muscles. She refuses brushing her own hair and refuses therapist request to hold a single hand, in order to assess tolerance to sitting without UE support. Standing: Poor;  Mod A   Comments: Pt declines use of walker, requests handhold assist. Stands at EOB in heavy posterior lean, heel weight bearing, decreased proprioception. Bed Mobility   Supine to Sit:    Supervision with HOB elevated  Sit to Supine:   Not Tested (pt left up in chair at end of session)  Rolling:   Not Tested  Scooting in sitting: Supervision  Scooting in supine:  Not Tested    Transfer Training     Sit to stand:   Min A   Stand to sit:   Mod A to prevent premature sit to chair during bed>chair transfer  Bed to Chair:   Mod A  with use of gait belt and handhold assist. Stand pivot transfer. 4-5 very short pivot steps. Gait gait deferred due to difficulty with transfers; pt ambulated 0 ft. Distance:      NA ft  Deviations (firm surface/linoleum):  N/A  Assistive Device Used:    N/A  Level of Assist:    N/A  Comment: NA    Stair Training deferred, pt unsafe/ not appropriate to complete stairs at this time    Activity Tolerance   Pt completed therapy session with increased anxiety once she sat up to EOB. Extended rest break to recover/relax. Pt does not respond well to cues for breathing technique - \"I've heard all that before\". Soft tissue massage to upper traps and shoulders provided to assist with relaxation. Pt tolerated this well for ~3 minutes. Supine: /82, SpO2 mid-90's  Seated EOB: Sp02 95% HR 77->114 with noted increase in anxiety -> 90  After bed>chair transfer: SpO2 mid-90%'s, HR mid-90's. Positioning Needs   Pt up in chair, alarm set, positioned in proper neutral alignment and pressure relief provided. Call light provided and all needs within reach    Exercises Initiated  Roger deferred secondary to treatment focus on functional mobility  NA    Other  None. Patient/Family Education   Pt educated on role of inpatient PT, POC, importance of continued activity, pursed lip breathing, energy conservation and calling for assist with mobility. Assessment  Pt seen for Physical Therapy evaluation in acute care setting.   Pt demonstrated decreased Activity tolerance, Balance, Safety and Strength as well as decreased independence with Ambulation and Transfers. Pt moves fairly well with bed mobility. She develops increased anxiety immediately upon sitting up to EOB and requires extended therapeutic rest breaks. She has poor standing balance, requiring Min-Mod A for bed>chair transfer. Pt will benefit from skilled PT in acute setting to promote activity tolerance and independent functional mobility. Recommending SNF upon discharge as patient functioning well below baseline, demonstrates good rehab potential and unable to return home due to burden of care beyond caregiver ability, home environment not conducive to patient recovery and limited safety awareness. Goals : To be met in 3 visits:  1). Independent with LE Ex x 10 reps    To be met in 6 visits:  1). Supine to/from sit: Modified Independent  2). Sit to/from stand: SBA  3). Bed to chair: SBA  4). Gait: Ambulate 15 ft.   with  CGA and use of LRAD (least restrictive assistive device)  5). Tolerate B LE exercises 3 sets of 10-15 reps    Rehabilitation Potential: Fair  Strengths for achieving goals include:   Pt motivated, Family Support and Pt cooperative   Barriers to achieving goals include:    Complexity of condition and Weakness    Plan    To be seen 3-5 x / week  while in acute care setting for therapeutic exercises, bed mobility, transfers, progressive gait training, balance training, and family/patient education. Signature: Frederick An, PT, DPT    If patient discharges from this facility prior to next visit, this note will serve as the Discharge Summary.

## 2021-10-14 NOTE — FLOWSHEET NOTE
10/14/21 0115   Vital Signs   Temp 97.1 °F (36.2 °C)   Temp Source Axillary   Pulse 80   Heart Rate Source Monitor   Resp 18   BP (!) 149/74   BP Location Right upper arm   Level of Consciousness Alert (0)   MEWS Score 1     Pt awake in bed on BiPAP. Pt denies any further needs at this time. Call light in reach. Will continue to monitor.

## 2021-10-14 NOTE — CARE COORDINATION
DISCHARGE ORDER  Date/Time 10/14/2021 2:59 PM  Completed by: Lennox Ruder, RN, Case Management    Patient Name: Hali Kimbrough    : 1940      Admit order Date and Status:10/12/2021  Noted discharge order. (verify MD's last order for status of admission/Traditional Medicare 3 [place] Inpatient qualifying stay required for SNF)    Confirmed discharge plan with:              Patient:  Yes              When pt confirms DC plan does any support person need to be contacted by CM Yes if yes who__Juan (daughter)____                      Discharge to Facility: [address] [place] [address] phone number for staff giving report:   · Name: [facility])  · Address: [address] , [place], [place], [facility]  · Phone:[phone]  · CIX:[phone] or [phone]--DAY asked that it be faxed to [phone]       Pre-certification completed: not needed   Hospital Exemption Notification (HENS) completed: not needed, returning   Discharge orders and Continuity of Care faxed to facility:  [phone], per request of [facility] with [facility]      Transportation:               Medical Transport explained with choice list offered to pt/family. Choice:Yes(no preference)  Agency used: [address] up time:   1600      Pt/family/Nursing/Facility aware of  time: yes  Yes Names: pt, pt's daughter who is at bedside [facility],   Ambulance form completed:  yes:      Comments:Faxed LUPE/AVS to [phone]. ADDISON spoke with Dr. Marc Easton and informed him that pt has an Asteril (AVAPS) at [facility], as this is from her admission 3 times ago and it went wit her to [facility]. Pt has been getting the Trilogy at [facility]. Emelia with [facility] confirmed this. ADDISON informed Emelia with [facility] that Dr. Mena Holcomb wanted to make sure that pt stayed on the Trilogy.  CM faxed the settings of the Trilogy and placed the Respiratory note and settings in the LUPE/AVS.  Dr. Jules Beltran has ordered 0.5mg of Ativan IV  x 1 to be administered prior to d/c d/t anxiety to go to [facility]. Giovani Bhakta is aware that pt is remain on Trilogy at [facility]. Pt's daughter, Charo Guerra, is aware that the visitor policy open and pt can have as many visitors from 0800-2000pm.      Pt is being d/c'd to [facility] today. Pt's O2 sats are 96% on 3L. Discharge timeout done with Wei Pisano RN. All discharge needs and concerns addressed. Discharging nurse to complete LUPE, reconcile AVS, and place final copy with patient's discharge packet. Discharging RN to ensure that written prescriptions for  Level II medications are sent with patient to the facility as per protocol.

## 2021-10-14 NOTE — PROGRESS NOTES
RT Inhaler-Nebulizer Bronchodilator Protocol Note    There is a bronchodilator order in the chart from a provider indicating to follow the RT Bronchodilator Protocol and there is an Initiate RT Inhaler-Nebulizer Bronchodilator Protocol order as well (see protocol at bottom of note). CXR Findings:  XR CHEST PORTABLE    Result Date: 10/12/2021  Tip of NG tube is at the GE junction. Consider further advancement by approximately 8-9 cm Slight improved aeration of the lungs. Bilateral pleuroparenchymal disease remains. The findings from the last RT Protocol Assessment were as follows:   History Pulmonary Disease: Chronic pulmonary disease  Respiratory Pattern: Dyspnea on exertion or RR 21-25 bpm  Breath Sounds: Slightly diminished and/or crackles  Cough: Strong, spontaneous, non-productive  Indication for Bronchodilator Therapy: On home bronchodilators, Decreased or absent breath sounds  Bronchodilator Assessment Score: 6    Aerosolized bronchodilator medication orders have been revised according to the RT Inhaler-Nebulizer Bronchodilator Protocol below. Respiratory Therapist to perform RT Therapy Protocol Assessment initially then follow the protocol. Repeat RT Therapy Protocol Assessment PRN for score 0-3 or on second treatment, BID, and PRN for scores above 3. No Indications  adjust the frequency to every 6 hours PRN wheezing or bronchospasm, if no treatments needed after 48 hours then discontinue using Per Protocol order mode. If indication present, adjust the RT bronchodilator orders based on the Bronchodilator Assessment Score as indicated below. Use Inhaler orders unless patient has one or more of the following: on home nebulizer, not able to hold breath for 10 seconds, is not alert and oriented, cannot activate and use MDI correctly, or respiratory rate 25 breaths per minute or more, then use the equivalent nebulizer order(s) with same Frequency and PRN reasons based on the score.   If a patient is on this medication at home then do not decrease Frequency below that used at home. 0-3  enter or revise RT bronchodilator order(s) to equivalent RT Bronchodilator order with Frequency of every 4 hours PRN for wheezing or increased work of breathing using Per Protocol order mode. 4-6  enter or revise RT Bronchodilator order(s) to two equivalent RT bronchodilator orders with one order with BID Frequency and one order with Frequency of every 4 hours PRN wheezing or increased work of breathing using Per Protocol order mode. 7-10  enter or revise RT Bronchodilator order(s) to two equivalent RT bronchodilator orders with one order with TID Frequency and one order with Frequency of every 4 hours PRN wheezing or increased work of breathing using Per Protocol order mode. 11-13  enter or revise RT Bronchodilator order(s) to one equivalent RT bronchodilator order with QID Frequency and an Albuterol order with Frequency of every 4 hours PRN wheezing or increased work of breathing using Per Protocol order mode. Greater than 13  enter or revise RT Bronchodilator order(s) to one equivalent RT bronchodilator order with every 4 hours Frequency and an Albuterol order with Frequency of every 2 hours PRN wheezing or increased work of breathing using Per Protocol order mode.          Electronically signed by Reuben Mendoza RCP on 10/14/2021 at 6:56 AM

## 2021-10-14 NOTE — PROGRESS NOTES
Patient educated on discharge instructions as well as new medications use, dosage, administration and possible side effects. Patient verified knowledge. IV removed without difficulty and dry dressing in place. Telemetry monitor removed and returned to [facility]. Pt left facility in stable condition to Skilled nursing facility with all of their personal belongings. Attempted to call report to [facility] x4. No answer at facility.

## 2021-10-14 NOTE — PROGRESS NOTES
10/13/21 9358   NIV Type   Skin Protection for O2 Device Yes   Location Nose   NIV Started/Stopped On   Equipment Type v60   Mode Bilevel   Mask Type Full face mask   Mask Size Small   Settings/Measurements   IPAP 16 cmH20   CPAP/EPAP 8 cmH2O   Rate Ordered 16   Resp 19   FiO2  30 %   I Time/ I Time % 1 s   Vt Exhaled 518 mL   Minute Volume 11.8 Liters   Mask Leak (lpm) 6 lpm   Comfort Level Good   Using Accessory Muscles No   SpO2 98   Alarm Settings   Alarms On Y   Press Low Alarm 5 cmH2O   High Pressure Alarm 30 cmH2O   Delay Alarm 20 sec(s)   Resp Rate Low Alarm 16   High Respiratory Rate 40 br/min

## 2021-10-14 NOTE — PROGRESS NOTES
Inpatient Occupational Therapy  Evaluation and Treatment    Unit: PCU  Date:  10/14/2021  Patient Name:    Divya Cruz  Admitting diagnosis:  Acute respiratory failure ([facility] [address] [J96.00]  COPD exacerbation ([facility] [address] [J44.1]  Acute respiratory failure with hypoxia and hypercapnia ([facility] [address] [address] [J96.01, J96.02]  Admit Date:  10/12/2021  Precautions/Restrictions/WB Status/ Lines/ Wounds/ Oxygen:   Fall risk, Bed/chair alarm, Lines -Supplemental O2 (2.5L) and Bill catheter, [place] (hard of hearing), Telemetry and Continuous pulse oximetry,impaired eyesight   Sacral shallow stage 3 pressure injury  Extubated on 10-12-21   Treatment Time:  8484-4582  Treatment Number: 1   Timed code treatment minutes 25 minutes   Total Treatment minutes:   35   minutes    Patient Goals for Therapy:  \" go home  \"      Discharge Recommendations: SNF  DME needs for discharge: defer to facility       Therapy recommendations for staff:   Assist of 1 with use of No AD for all transfers to/from [facility]  to/from [facility]    History of Present Illness: H&P per [place] 10-12-21    [de-identified] y.o. female who presents to the emergency department complaining of acute on chronic respiratory failure, patient unresponsive on arrival.  By report patient to call out to [address] [facility] staff saying that she was short of breath and anxious, sats were checked and found to be in the 45s. Patient reportedly with does wear CPAP at night. Patient not able to provide history on arrival.  Limited responsiveness on arrival with pulse, belly breathing decision was made to intubate patient on arrival for airway protection. HPI limited due to altered mentation, acute respiratory failure  Home Health S4 Level Recommendation:  NA  AM-PAC Score: 15    Preadmission Environment    Several re admissions to hospital CMs note state Skilled at [facility]     Pt.  Denis Jennifer family ( Son)  (Son who works just across the street )-Pt alone at times   Home environment:    one story home  Steps to enter first floor: 1 steps to enter and no handrail  Steps to second floor: N/A  Bathroom: tub/shower unit, and standard height commode,tub transfer bench  Equipment owned: SW, wc (manual), lift chair, hospital bed  (doesn't go up and down), home O2 (2L) continous and lifealert     Preadmission Status:  Pt. Able to drive: No  Pt Fully independent with ADLs: yes - sponge bath   Pt. Required assistance from family for: Cleaning, Cooking and Laundry -sometimes the pt would assist on her good days   Pt. independent for transfers and gait and walked with No Device  History of falls No    Pain  None at rest   Rating:mild  Location:LEs with movement   Pain Medicine Status: No request made      Cognition    A&O Person , Pt stated it was Oct however could name specific date and could not state the Yr   Able to follow 2 step commands    Subjective  Patient lying supine in bed with family at bedside. Pt agreeable to this OT eval & tx. Upper Extremity ROM:    WFL    Upper Extremity Strength:    3/5 through observation     Upper Extremity Sensation    WFL    Upper Extremity Proprioception:  NT    Coordination and Tone  Diminished    Balance  Functional Sitting Balance:  WFL  Functional Standing Balance:Impaired    Bed mobility:    Supine to sit:   SBA  Sit to supine:   Not Tested  Rolling:    Not Tested  Scooting in sitting:  SBA  Scooting to head of bed:   Not Tested    Bridging:   Not Tested    Transfers:    Sit to stand:  Min A  Stand to sit:  Min A   Bed to chair:   Mod A   Standard toilet: Not Tested  Bed to [facility]:  Not Tested    Dressing:      UE:   Not Tested  LE:    Max A to don socks    Bathing:    UE:  Not Tested  LE:  Not Tested    Eating: Mod A     Toileting:  Not Tested    Activity Tolerance   Pt completed therapy session with anxiety, decreased balance   /82 supine   Sp02 sitting 95% HR 77   Positioning Needs:   Pt up in chair, alarm set, positioned in proper neutral alignment and pressure relief provided.  nurse notified pt up in the chair     Exercise / Activities Initiated:   N/A    Patient/Family Education:   Role of OT    Assessment of Deficits: Pt seen for Occupational therapy evaluation in acute care setting. Pt demonstrated decreased Activity tolerance, ADLs, Balance , Bed mobility and Transfers. Pt functioning below baseline and will likely benefit from skilled occupational therapy services to maximize safety and independence. Goal(s) : To be met in 3 Visits:  1). Bed to toilet/BSC: CGA    To be met in 5 Visits:  1). Supine to/from Sit:  Modified Independent  2). Upper Body Bathing:   Supervision  3). Lower Body Bathing: Mod A   4). Upper Body Dressing:  SBA  5). Lower Body Dressing: Mod A   6). Pt to demonstrate UE exs x 15 reps with minimal cues    Rehabilitation Potential:  Fair for goals listed above. Strengths for achieving goals include: Family Support  Barriers to achieving goals include:  Complexity of condition     Plan: To be seen 3-5 x/wk while in acute care setting for therapeutic exercises, bed mobility, transfers, dressing, bathing, family/patient education, ADL/IADL retraining, energy conservation training.      Migdalia Vogt OTR/L [id number]          If patient discharges from this facility prior to next visit, this note will serve as the Discharge Summary

## 2021-10-14 NOTE — PROGRESS NOTES
Pulmonary Progress Note    CC: Chronic hypercarbic respiratory failure    Subjective: Tolerated Bipap for several hours overnight. C/o noise from the air leak. EXAM: /75   Pulse 87   Temp 97 °F (36.1 °C) (Oral)   Resp 16   Ht 5' 1\" (1.549 m)   Wt 106 lb 3.2 oz (48.2 kg)   SpO2 98%   BMI 20.07 kg/m²  on 2L  Constitutional:  No acute distress. Eyes: PERRL. Conjunctivae anicteric. ENT: Normal nose. Normal tongue. Neck:  Trachea is midline. No thyroid tenderness. Respiratory: + accessory muscle usage. Decreased breath sounds. No wheezes. No rales. No Rhonchi. Cardiovascular: Normal S1S2. No digit clubbing. No digit cyanosis. No LE edema. Psychiatric: No anxiety or Agitation. Alert and Oriented to person, place and time.     Scheduled Meds:   influenza virus vaccine  0.5 mL IntraMUSCular Prior to discharge    busPIRone  10 mg Oral TID    predniSONE  30 mg Oral Daily    sertraline  50 mg Oral Daily    miconazole nitrate   Topical BID    clopidogrel  75 mg Oral Daily    atorvastatin  20 mg Oral Nightly    magnesium oxide  400 mg Oral Daily    metoprolol tartrate  25 mg Oral BID    pantoprazole  20 mg Oral Daily    polyethylene glycol  17 g Oral BID    sodium chloride flush  5-40 mL IntraVENous 2 times per day    enoxaparin  40 mg SubCUTAneous Daily    ketorolac  15 mg IntraVENous Once    mupirocin   Nasal BID    ipratropium-albuterol  3 mL Inhalation 4x daily     Continuous Infusions:   sodium chloride      dextrose       PRN Meds:  ipratropium-albuterol, sodium chloride flush, sodium chloride, ondansetron **OR** ondansetron, polyethylene glycol, acetaminophen **OR** acetaminophen, glucose, dextrose, glucagon (rDNA), dextrose    Labs:  CBC:   Recent Labs     10/12/21  0236 10/13/21  0422   WBC 15.1* 7.4   HGB 10.5* 8.1*   HCT 34.7* 25.5*   MCV 94.7 90.9    180     BMP:   Recent Labs     10/12/21  0236 10/13/21  0422    140   K 4.7 3.6    106   CO2 26 26 PHOS  --  2.6   BUN 16 15   CREATININE 1.1 0.8       Cultures:    Chest imaging was reviewed by me and showed   Scattered opacities are seen throughout the left and right lung, slightly   decreased compared to prior.  Superimposed pleural effusions are seen.         ASSESSMENT:  · Acute on chronic hypercarbic and hypoxic respiratory failure - probably secondary to inadequate use of trilogy avaps at her facility rather than a copd exacerbation  · End-stage COPD  · Pleural effusions and CHF related changes on CTPA     PLAN:  · Bipap nightly and with naps  · Duonebs  · Steroids - decrease to 30mg daily  · Lasix 20mg IV x 1  · Restart buspar and zoloft  · Lovenox DVT prophylaxis  · Ok to d/c

## 2021-10-14 NOTE — PROGRESS NOTES
Patient resting in bed comfortably. Vitals stable. Call light within reach. Will continue to monitor.

## 2021-10-14 NOTE — DISCHARGE SUMMARY
Name:  Herve Li  Room:  /5961-75  MRN:    [id number]    Discharge Summary      This discharge summary is in conjunction with a complete physical exam done on the day of discharge. Discharging Physician: Dr. Marcus Woodsi: 10/12/2021  Discharge:   10/14/21      HPI taken from admission H&P:        The patient is a [de-identified] y.o. female with severe end-stage COPD, severe anxiety disorder , malnutrition , CAD, HTN, mood DO, GERD who presented to [facility] ED from nursing home in respiratory distress. Reportedly given ativan at NH for anxiety, subsequently became obtunded and sent to ER for eval.  She was intubated by ER provider and admitted to the ICU. Patient has had multiple recurrent admissions for acute on chronic hypoxic hypercapnic respiratory failure. Recurrent issues with anxiety. She has a trilogy unit in the nursing home. Since admission to the ICU patient has been stable on mechanical ventilation. She is awake and alert now and responding very well. O2 sats stable on FiO2 35%. Seen by intensivist . plan is to extubate soon.          Diagnoses this Admission and Hospital Course   Acute on chronic hypoxic and hypercarbic respiratory failure  #Advanced end-stage COPD  -Recurrent admissions for acute respiratory failure in the setting of severe anxiety . Brought in from the nursing home and obtunded state, reportedly after getting Ativan for anxiety  - intubated in ER,  Admitted  to ICU. Seen by intensivist  -Patient is currently stable on mechanical ventilation, blood gases are stable. Extubated to BiPAP yesterday. Was on BiPAP last night. Currently on 2 L of oxygen. She has a trilogy unit which he uses at night and her nursing home. #Severe advanced COPD with AE  - steroids  - duonebs  Resume Trilogy      #Chronic anxiety  #Panic attacks  -Patient was given BuSpar for anxiety in the past this has not helped her . during her last visit to the hospital .  Give her low-dose Ativan which helped her anxiety .   -Continue Zoloft  Patient has been in the nursing home for the last 10 days . #Lactic acidosis  -In the setting of respiratory failure. Lactic acid levels are improving now     #CAD  #HTN  - cont lopressor, statin, plavix     #GERD  - cont PPI     #Severe malnutrition  #Generalized weakness and debility         Procedures (Please Review Full Report for Details)  None     Consults    Pulmonary       Physical Exam at Discharge:    /74   Pulse 92   Temp 97 °F (36.1 °C) (Oral)   Resp 16   Ht 5' 1\" (1.549 m)   Wt 106 lb 3.2 oz (48.2 kg)   SpO2 96%   BMI 20.07 kg/m²     See progress note day of discharge     CBC:   Recent Labs     10/12/21  0236 10/13/21  0422   WBC 15.1* 7.4   HGB 10.5* 8.1*   HCT 34.7* 25.5*   MCV 94.7 90.9    180     BMP:   Recent Labs     10/12/21  0236 10/13/21  0422    140   K 4.7 3.6    106   CO2 26 26   PHOS  --  2.6   BUN 16 15   CREATININE 1.1 0.8     LIVER PROFILE:   Recent Labs     10/12/21  0236   AST 77*   ALT 39   BILITOT 0.3   ALKPHOS 132*     PT/INR: No results for input(s): PROTIME, INR in the last 72 hours. APTT: No results for input(s): APTT in the last 72 hours. UA:  Recent Labs     10/12/21  0405   COLORU Yellow   PHUR 6.5   WBCUA 6-9*   RBCUA 3-4   YEAST Present*   BACTERIA 2+*   CLARITYU Clear   SPECGRAV 1.025   LEUKOCYTESUR TRACE*   UROBILINOGEN 0.2   BILIRUBINUR Negative   BLOODU SMALL*   GLUCOSEU Negative         CULTURES    Blood: first set NGTD- second pending  Urine:   Candida albicans    Urine Culture, Routine 10/12/2021  4:05 AM [facility]   >100,000 CFU/ml       Sputum: ordered  COVID 19 PCR: not detected     RADIOLOGY  XR CHEST PORTABLE   Final Result   Interval removal of enteric tube and ET tube. Small left pleural effusion. Improved appearance bilateral lungs. XR CHEST PORTABLE   Final Result   Tip of NG tube is at the GE junction.   Consider further advancement by approximately 8-9 cm      Slight improved aeration of the lungs. Bilateral pleuroparenchymal disease   remains. CT CHEST PULMONARY EMBOLISM W CONTRAST   Final Result   1. Enteric tube distal tip at the GE junction. Advancement is recommended so   that the NG tube is appropriately positioned and side-port/distal tip are   located beyond the GE junction. 2. No clear evidence for pulmonary embolus. 3. Moderate bilateral pleural effusions. Interlobular septal thickening   throughout both lungs with respiratory motion. Compressive atelectasis in   the posterior aspect of both lungs. Cardiomegaly. Left atrial enlargement. Findings can be seen with CHF-related changes. Follow-up is recommended to   document resolution. 4. Adrenal hyperplasia. 5. Atherosclerotic calcification of the aorta and branch vasculature. Coronary artery disease. 6. Prior median sternotomy. XR CHEST PORTABLE   Final Result   1. The endotracheal tube tip is approximately 3 cm above the brayden. 2. Enteric tube remains in the esophagus. The tube should be advanced 10 cm. 3. Right lung pneumonia. 4. Left basilar atelectasis or pneumonia. 5. Suspect pulmonary edema superimposed on chronic interstitial lung disease. Discharge Medications     Medication List      START taking these medications    miconazole nitrate 2 % Oint  Apply topically 2 times daily        CHANGE how you take these medications    predniSONE 10 MG tablet  Commonly known as: DELTASONE  3 tabs a day for 5 days,  Then 2 tabs a day for 5 days ,  Then 1 tab daily  What changed: additional instructions        CONTINUE taking these medications    acetaminophen 500 MG tablet  Commonly known as: TYLENOL  Notes to patient: As needed for pain     albuterol sulfate  (90 Base) MCG/ACT inhaler  Commonly known as: Proventil HFA  Inhale 2 puffs into the lungs every 6 hours as needed for Wheezing or Shortness of Breath.   Notes to

## 2021-10-14 NOTE — PROGRESS NOTES
10/14/21 0249   NIV Type   Skin Protection for O2 Device Yes   Location Nose   NIV Started/Stopped On   Equipment Type v60   Mode Bilevel   Mask Type Full face mask   Mask Size Small   Settings/Measurements   IPAP 16 cmH20   CPAP/EPAP 8 cmH2O   Rate Ordered 16   Resp 16   FiO2  30 %   I Time/ I Time % 1 s   Vt Exhaled 467 mL   Minute Volume 9.1 Liters   Mask Leak (lpm) 14 lpm   Comfort Level Good   Using Accessory Muscles No   SpO2 97

## 2021-10-14 NOTE — DISCHARGE INSTR - COC
Continuity of Care Form    Patient Name: Jerry    :    MRN:  [id number]    Admit date:  10/12/2021  Discharge date:  10/14/2021    Code Status Order: Full Code   Advance Directives:      Admitting Physician:  Josh Molina MD  PCP: Jazzy Lynch MD    Discharging Nurse: [facility] Unit/Room#: /1160-76  Discharging Unit Phone Number: ***    Emergency Contact:   Extended Emergency Contact Information  Primary Emergency Contact: [address] of [address] [place] [address] Phone: [phone]  Relation: Child  Secondary Emergency Contact: [address] [facility] Phone: [phone]  Relation: Other    Past Surgical History:  Past Surgical History:   Procedure Laterality Date    BRONCHOSCOPY  2019    Dr. Shauna Davis - w/BAL   [address]  2019    Dr. Lili Carreon N/A 2020    COLONOSCOPY DIAGNOSTIC performed by Katie Childress DO at [address] N/A 2019    OFF PUMP CORONARY ARTERY BYPASS GRAFTING X2, INTERNAL MAMMARY ARTERY, SAPHENOUS VEIN GRAFT performed by Jeannie Balderas MD at [facility] CATH  2021    IR [facility] CATH 2021 [address] [facility], PARTIAL Left     SIGMOIDOSCOPY  2020    4 bands    SIGMOIDOSCOPY N/A 2020    FLEX SIG W/ BANDING SIGMOIDOSCOPY DIAGNOSTIC FLEXIBLE performed by Katie Childress DO at [address] [place] [address]       Immunization History:   Immunization History   Administered Date(s) Administered    COVID-19, Pfizer, PF, 30mcg/0.3mL 2021, 2021    Influenza Virus Vaccine 10/17/2013    Influenza, Elyce Bushnell, 6 mo and older, IM, PF (Flulaval, Fluarix) 2019    Influenza, Quadv, IM, PF (6 mo and older Fluzone, Flulaval, Fluarix, and 3 yrs and older Afluria) 10/04/2019    Pneumococcal Conjugate 13-valent ([id number]) 2017    Pneumococcal Polysaccharide ([id number]) 02/19/2015    Tdap (Boostrix, Adacel) 07/17/2018       Active Problems:  Patient Active Problem List   Diagnosis Code    Hyperlipidemia E78.5    Asthma J45.909    OA (osteoarthritis) M19.90    Tobacco use Z72.0    COPD exacerbation ([facility] J44.1    Sepsis ([facility] A41.9    Abnormal chest x-ray R93.89    COPD with acute exacerbation ([facility]) J44.1    Shortness of breath R06.02    Tobacco abuse Z72.0    Moderate malnutrition ([facility]) E44.0    NSTEMI (non-ST elevated myocardial infarction) ([facility]) I21.4    Elevated troponin R77.8    Acute respiratory failure with hypoxia ([facility]) J96.01    CAD (coronary artery disease) I25.10    Acute pulmonary edema ([facility]) J81.0    Pulmonary infiltrate R91.8    Elevated brain natriuretic peptide (BNP) level R79.89    Leukocytosis D72.829    Ischemic cardiomyopathy I25.5    Acute combined systolic and diastolic congestive heart failure ([facility]) I50.41    Hypernatremia E87.0    NADJA (acute kidney injury) ([facility] N17.9    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    Mucus plugging of bronchi T17.500A    CAD in native artery I25.10    S/P CABG (coronary artery bypass graft) Z95.1    Pneumonia of both lower lobes due to methicillin resistant Staphylococcus aureus (MRSA) ([facility]) J15.212    GI bleed K92.2    Severe malnutrition ([facility]) E43    Chest pain R07.9    Chronic respiratory failure with hypoxia ([facility]) J96.11    Essential hypertension I10    Anemia D64.9    Acute respiratory failure ([facility]) J96.00    Acute respiratory distress R06.03    Volume overload E87.70    Demand ischemia ([facility]) I24.8    GERD (gastroesophageal reflux disease) K21.9    Acute respiratory failure with hypoxia and hypercapnia ([facility]) J96.01, J96.02    Shock ([facility]) R57.9    Pneumonia due to infectious organism J18.9    Acute on chronic respiratory failure with hypoxia and hypercapnia ([facility]) J96.21, J96.22    Chronic obstructive pulmonary disease ([facility]) J44.9    Acute on chronic respiratory failure with hypoxemia (HCC) J96.21    Chronic anxiety F41.9    Congestive heart failure (HCC) I50.9    Acute respiratory failure with hypoxia and hypercarbia (HCC) J96.01, J96.02    Acute kidney injury (HCC) N17.9    Elevated procalcitonin R79.89    COPD, severe (HCC) J44.9    Anxiety F41.9    Severe anxiety F41.9    Severe protein-calorie malnutrition (HCC) E43       Isolation/Infection:   Isolation            No Isolation          Patient Infection Status       Infection Onset Added Last Indicated Last Indicated By Review Planned Expiration Resolved Resolved By    None active    Resolved    COVID-19 Rule Out 10/12/21 10/12/21 10/12/21 COVID-19 & Influenza Combo (Ordered)   10/12/21 Rule-Out Test Resulted    COVID-19 Rule Out 09/28/21 09/28/21 09/28/21 COVID-19 & Influenza Combo (Ordered)   09/28/21 Rule-Out Test Resulted    COVID-19 Rule Out 09/27/21 09/27/21 09/27/21 COVID-19, Rapid (Ordered)   09/27/21 Rule-Out Test Resulted    COVID-19 Rule Out 09/23/21 09/23/21 09/23/21 COVID-19, Rapid (Ordered)   09/23/21 Rule-Out Test Resulted    COVID-19 Rule Out 09/16/21 09/16/21 09/16/21 COVID-19 & Influenza Combo (Ordered)   09/16/21 Rule-Out Test Resulted    COVID-19 Rule Out 09/09/21 09/09/21 09/09/21 COVID-19 (Ordered)   09/10/21 Delano Duran RN    COVID-19 Rule Out 09/09/21 09/09/21 09/09/21 COVID-19, Rapid (Ordered)   09/09/21 Rule-Out Test Resulted    COVID-19 Rule Out 09/09/21 09/09/21 09/09/21 SARS-CoV-2 NAAT (Rapid) (Ordered)   09/09/21 Rule-Out Test Resulted    COVID-19 Rule Out 08/30/21 08/30/21 08/30/21 COVID-19 (Ordered)   08/30/21 Rule-Out Test Resulted    COVID-19 Rule Out 08/29/21 08/29/21 08/29/21 COVID-19, Rapid (Ordered)   08/29/21 Rule-Out Test Resulted    COVID-19 Rule Out 06/01/21 06/01/21 06/01/21 COVID-19 & Influenza Combo (Ordered)   06/01/21 Rule-Out Test Resulted    MRSA 09/22/19 09/25/19 09/22/19 Respiratory Culture   06/01/21 Fernando Wei, RN            Nurse Vent List:[id number]:::0}    Rehab Therapies: Physical Therapy, Occupational Therapy and SN  Weight Bearing Status/Restrictions: 50Ayana MCKEON Weight Bearin:::0}  Other Medical Equipment (for information only, NOT a DME order):  {EQUIPMENT:[id number]}  Other Treatments: ***    Patient's personal belongings (please select all that are sent with patient):  {CHP DME Belongings:[id number]:::0}    RN SIGNATURE:  {Esignature:[id number]:::0}    CASE MANAGEMENT/SOCIAL WORK SECTION    Inpatient Status Date: 10/12/2021    Readmission Risk Assessment Score:  Readmission Risk              Risk of Unplanned Readmission:  40           Discharging to Facility/ Agency     Name: [facility])  Address: [address] , [place], [place] [address]  MNU:[phone] or [phone]        / signature: Electronically signed by Parisa Peacock RN on 10/14/21 at 2:58 PM EDT    PHYSICIAN SECTION    Prognosis: Fair    Condition at Discharge: Stable    Rehab Potential (if transferring to Rehab): Fair    Recommended Labs or Other Treatments After Discharge: Trilogy unit at night    Settings as of 10/14/2021 0249AM    10/14/21 0249   NIV Type   Skin Protection for O2 Device Yes   Location Nose   NIV Started/Stopped On   Equipment Type v60   Mode Bilevel   Mask Type Full face mask   Mask Size Small   Settings/Measurements   IPAP 16 cmH20   CPAP/EPAP 8 cmH2O   Rate Ordered 16   Resp 16   FiO2  30 %   I Time/ I Time % 1 s   Vt Exhaled 467 mL   Minute Volume 9.1 Liters   Mask Leak (lpm) 14 lpm   Comfort Level Good   Using Accessory Muscles No   SpO2 97         Physician Certification: I certify the above information and transfer of Tiffani Osorio  is necessary for the continuing treatment of the diagnosis listed and that she requires [facility] for less 30 days.      Update Admission H&P: No change in H&P    PHYSICIAN SIGNATURE:  Electronically signed by [facility] Priti Warren MD on 10/14/21 at 10:05 AM EDT

## 2021-10-16 LAB
BLOOD CULTURE, ROUTINE: NORMAL
CULTURE, BLOOD 2: NORMAL

## 2021-10-17 ENCOUNTER — APPOINTMENT (OUTPATIENT)
Dept: GENERAL RADIOLOGY | Age: 81
DRG: 871 | End: 2021-10-17
Payer: MEDICARE

## 2021-10-17 ENCOUNTER — HOSPITAL ENCOUNTER (INPATIENT)
Age: 81
LOS: 9 days | Discharge: NURSING, REHAB OR LONG-TERM CARE | DRG: 871 | End: 2021-10-26
Attending: EMERGENCY MEDICINE | Admitting: INTERNAL MEDICINE
Payer: MEDICARE

## 2021-10-17 DIAGNOSIS — J96.22 ACUTE ON CHRONIC RESPIRATORY FAILURE WITH HYPOXIA AND HYPERCAPNIA (HCC): Primary | ICD-10-CM

## 2021-10-17 DIAGNOSIS — A41.9 SEPTICEMIA (HCC): ICD-10-CM

## 2021-10-17 DIAGNOSIS — J96.21 ACUTE ON CHRONIC RESPIRATORY FAILURE WITH HYPOXIA AND HYPERCAPNIA (HCC): Primary | ICD-10-CM

## 2021-10-17 DIAGNOSIS — J18.9 HCAP (HEALTHCARE-ASSOCIATED PNEUMONIA): ICD-10-CM

## 2021-10-17 DIAGNOSIS — R73.9 HYPERGLYCEMIA: ICD-10-CM

## 2021-10-17 DIAGNOSIS — F41.9 CHRONIC ANXIETY: ICD-10-CM

## 2021-10-17 LAB
A/G RATIO: 1.3 (ref 1.1–2.2)
ALBUMIN SERPL-MCNC: 3.9 G/DL (ref 3.4–5)
ALP BLD-CCNC: 107 U/L (ref 40–129)
ALT SERPL-CCNC: 20 U/L (ref 10–40)
ANION GAP SERPL CALCULATED.3IONS-SCNC: 12 MMOL/L (ref 3–16)
AST SERPL-CCNC: 22 U/L (ref 15–37)
BASE EXCESS ARTERIAL: 1.6 MMOL/L (ref -3–3)
BASE EXCESS VENOUS: 1.8 MMOL/L (ref -3–3)
BASOPHILS ABSOLUTE: 0.1 K/UL (ref 0–0.2)
BASOPHILS RELATIVE PERCENT: 1.3 %
BILIRUB SERPL-MCNC: 0.4 MG/DL (ref 0–1)
BUN BLDV-MCNC: 15 MG/DL (ref 7–20)
CALCIUM SERPL-MCNC: 8.9 MG/DL (ref 8.3–10.6)
CARBOXYHEMOGLOBIN ARTERIAL: 0.3 % (ref 0–1.5)
CARBOXYHEMOGLOBIN: 1.7 % (ref 0–1.5)
CHLORIDE BLD-SCNC: 102 MMOL/L (ref 99–110)
CO2: 28 MMOL/L (ref 21–32)
CREAT SERPL-MCNC: 0.9 MG/DL (ref 0.6–1.2)
EOSINOPHILS ABSOLUTE: 0 K/UL (ref 0–0.6)
EOSINOPHILS RELATIVE PERCENT: 0.1 %
GFR AFRICAN AMERICAN: >60
GFR NON-AFRICAN AMERICAN: >60
GLOBULIN: 2.9 G/DL
GLUCOSE BLD-MCNC: 288 MG/DL (ref 70–99)
HCO3 ARTERIAL: 27.4 MMOL/L (ref 21–29)
HCO3 VENOUS: 27.8 MMOL/L (ref 23–29)
HCT VFR BLD CALC: 34.2 % (ref 36–48)
HEMOGLOBIN, ART, EXTENDED: 8.8 G/DL (ref 12–16)
HEMOGLOBIN: 10.3 G/DL (ref 12–16)
INR BLD: 0.91 (ref 0.88–1.12)
LACTIC ACID, SEPSIS: 2.8 MMOL/L (ref 0.4–1.9)
LACTIC ACID, SEPSIS: 3 MMOL/L (ref 0.4–1.9)
LYMPHOCYTES ABSOLUTE: 1.8 K/UL (ref 1–5.1)
LYMPHOCYTES RELATIVE PERCENT: 18.8 %
MCH RBC QN AUTO: 28.4 PG (ref 26–34)
MCHC RBC AUTO-ENTMCNC: 30.2 G/DL (ref 31–36)
MCV RBC AUTO: 94 FL (ref 80–100)
METHEMOGLOBIN ARTERIAL: 0.3 %
METHEMOGLOBIN VENOUS: 0.1 %
MONOCYTES ABSOLUTE: 0.1 K/UL (ref 0–1.3)
MONOCYTES RELATIVE PERCENT: 1.5 %
NEUTROPHILS ABSOLUTE: 7.6 K/UL (ref 1.7–7.7)
NEUTROPHILS RELATIVE PERCENT: 78.3 %
O2 CONTENT, VEN: 13 VOL %
O2 SAT, ARTERIAL: 98.9 %
O2 SAT, VEN: 79 %
O2 THERAPY: ABNORMAL
O2 THERAPY: ABNORMAL
PCO2 ARTERIAL: 49.5 MMHG (ref 35–45)
PCO2, VEN: 73.7 MMHG (ref 40–50)
PDW BLD-RTO: 22.1 % (ref 12.4–15.4)
PH ARTERIAL: 7.36 (ref 7.35–7.45)
PH VENOUS: 7.19 (ref 7.35–7.45)
PLATELET # BLD: 377 K/UL (ref 135–450)
PMV BLD AUTO: 8.3 FL (ref 5–10.5)
PO2 ARTERIAL: 156.6 MMHG (ref 75–108)
PO2, VEN: 53.9 MMHG (ref 25–40)
POTASSIUM REFLEX MAGNESIUM: 4.8 MMOL/L (ref 3.5–5.1)
PRO-BNP: 9920 PG/ML (ref 0–449)
PROCALCITONIN: 0.27 NG/ML (ref 0–0.15)
PROTHROMBIN TIME: 10.3 SEC (ref 9.9–12.7)
RBC # BLD: 3.64 M/UL (ref 4–5.2)
SARS-COV-2, NAAT: NOT DETECTED
SODIUM BLD-SCNC: 142 MMOL/L (ref 136–145)
TCO2 ARTERIAL: 28.9 MMOL/L
TCO2 CALC VENOUS: 30 MMOL/L
TOTAL PROTEIN: 6.8 G/DL (ref 6.4–8.2)
TROPONIN: 0.03 NG/ML
WBC # BLD: 9.7 K/UL (ref 4–11)

## 2021-10-17 PROCEDURE — 2700000000 HC OXYGEN THERAPY PER DAY

## 2021-10-17 PROCEDURE — 2580000003 HC RX 258: Performed by: INTERNAL MEDICINE

## 2021-10-17 PROCEDURE — 71045 X-RAY EXAM CHEST 1 VIEW: CPT

## 2021-10-17 PROCEDURE — 84484 ASSAY OF TROPONIN QUANT: CPT

## 2021-10-17 PROCEDURE — 2580000003 HC RX 258: Performed by: EMERGENCY MEDICINE

## 2021-10-17 PROCEDURE — 6360000002 HC RX W HCPCS: Performed by: EMERGENCY MEDICINE

## 2021-10-17 PROCEDURE — 36415 COLL VENOUS BLD VENIPUNCTURE: CPT

## 2021-10-17 PROCEDURE — 93005 ELECTROCARDIOGRAM TRACING: CPT | Performed by: EMERGENCY MEDICINE

## 2021-10-17 PROCEDURE — 80053 COMPREHEN METABOLIC PANEL: CPT

## 2021-10-17 PROCEDURE — 6370000000 HC RX 637 (ALT 250 FOR IP): Performed by: INTERNAL MEDICINE

## 2021-10-17 PROCEDURE — 87635 SARS-COV-2 COVID-19 AMP PRB: CPT

## 2021-10-17 PROCEDURE — 96375 TX/PRO/DX INJ NEW DRUG ADDON: CPT

## 2021-10-17 PROCEDURE — 82803 BLOOD GASES ANY COMBINATION: CPT

## 2021-10-17 PROCEDURE — 85610 PROTHROMBIN TIME: CPT

## 2021-10-17 PROCEDURE — 96365 THER/PROPH/DIAG IV INF INIT: CPT

## 2021-10-17 PROCEDURE — 94660 CPAP INITIATION&MGMT: CPT

## 2021-10-17 PROCEDURE — 6370000000 HC RX 637 (ALT 250 FOR IP): Performed by: EMERGENCY MEDICINE

## 2021-10-17 PROCEDURE — 87040 BLOOD CULTURE FOR BACTERIA: CPT

## 2021-10-17 PROCEDURE — 84145 PROCALCITONIN (PCT): CPT

## 2021-10-17 PROCEDURE — 1200000000 HC SEMI PRIVATE

## 2021-10-17 PROCEDURE — 99285 EMERGENCY DEPT VISIT HI MDM: CPT

## 2021-10-17 PROCEDURE — 85025 COMPLETE CBC W/AUTO DIFF WBC: CPT

## 2021-10-17 PROCEDURE — 83605 ASSAY OF LACTIC ACID: CPT

## 2021-10-17 PROCEDURE — 83036 HEMOGLOBIN GLYCOSYLATED A1C: CPT

## 2021-10-17 PROCEDURE — 83880 ASSAY OF NATRIURETIC PEPTIDE: CPT

## 2021-10-17 RX ORDER — METHYLPREDNISOLONE SODIUM SUCCINATE 125 MG/2ML
80 INJECTION, POWDER, LYOPHILIZED, FOR SOLUTION INTRAMUSCULAR; INTRAVENOUS ONCE
Status: COMPLETED | OUTPATIENT
Start: 2021-10-17 | End: 2021-10-17

## 2021-10-17 RX ORDER — METHYLPREDNISOLONE SODIUM SUCCINATE 40 MG/ML
40 INJECTION, POWDER, LYOPHILIZED, FOR SOLUTION INTRAMUSCULAR; INTRAVENOUS EVERY 12 HOURS
Status: DISCONTINUED | OUTPATIENT
Start: 2021-10-18 | End: 2021-10-18

## 2021-10-17 RX ORDER — MECOBALAMIN 5000 MCG
5 TABLET,DISINTEGRATING ORAL NIGHTLY PRN
Status: DISCONTINUED | OUTPATIENT
Start: 2021-10-18 | End: 2021-10-26 | Stop reason: HOSPADM

## 2021-10-17 RX ORDER — POLYETHYLENE GLYCOL 3350 17 G/17G
17 POWDER, FOR SOLUTION ORAL 2 TIMES DAILY
Status: DISCONTINUED | OUTPATIENT
Start: 2021-10-17 | End: 2021-10-26 | Stop reason: HOSPADM

## 2021-10-17 RX ORDER — 0.9 % SODIUM CHLORIDE 0.9 %
500 INTRAVENOUS SOLUTION INTRAVENOUS ONCE
Status: COMPLETED | OUTPATIENT
Start: 2021-10-17 | End: 2021-10-18

## 2021-10-17 RX ORDER — SODIUM CHLORIDE 0.9 % (FLUSH) 0.9 %
10 SYRINGE (ML) INJECTION PRN
Status: DISCONTINUED | OUTPATIENT
Start: 2021-10-17 | End: 2021-10-26 | Stop reason: HOSPADM

## 2021-10-17 RX ORDER — SODIUM CHLORIDE 0.9 % (FLUSH) 0.9 %
10 SYRINGE (ML) INJECTION EVERY 12 HOURS SCHEDULED
Status: DISCONTINUED | OUTPATIENT
Start: 2021-10-17 | End: 2021-10-26 | Stop reason: HOSPADM

## 2021-10-17 RX ORDER — SODIUM CHLORIDE 9 MG/ML
INJECTION, SOLUTION INTRAVENOUS CONTINUOUS
Status: DISCONTINUED | OUTPATIENT
Start: 2021-10-17 | End: 2021-10-18

## 2021-10-17 RX ORDER — BUSPIRONE HYDROCHLORIDE 10 MG/1
10 TABLET ORAL 3 TIMES DAILY
Status: DISCONTINUED | OUTPATIENT
Start: 2021-10-17 | End: 2021-10-26 | Stop reason: HOSPADM

## 2021-10-17 RX ORDER — ACETAMINOPHEN 325 MG/1
650 TABLET ORAL EVERY 6 HOURS PRN
Status: DISCONTINUED | OUTPATIENT
Start: 2021-10-17 | End: 2021-10-26 | Stop reason: HOSPADM

## 2021-10-17 RX ORDER — ONDANSETRON 2 MG/ML
4 INJECTION INTRAMUSCULAR; INTRAVENOUS EVERY 6 HOURS PRN
Status: DISCONTINUED | OUTPATIENT
Start: 2021-10-17 | End: 2021-10-26 | Stop reason: HOSPADM

## 2021-10-17 RX ORDER — IPRATROPIUM BROMIDE AND ALBUTEROL SULFATE 2.5; .5 MG/3ML; MG/3ML
1 SOLUTION RESPIRATORY (INHALATION)
Status: DISCONTINUED | OUTPATIENT
Start: 2021-10-18 | End: 2021-10-26 | Stop reason: HOSPADM

## 2021-10-17 RX ORDER — ACETAMINOPHEN 650 MG/1
650 SUPPOSITORY RECTAL EVERY 6 HOURS PRN
Status: DISCONTINUED | OUTPATIENT
Start: 2021-10-17 | End: 2021-10-26 | Stop reason: HOSPADM

## 2021-10-17 RX ORDER — PANTOPRAZOLE SODIUM 20 MG/1
20 TABLET, DELAYED RELEASE ORAL DAILY
Status: DISCONTINUED | OUTPATIENT
Start: 2021-10-18 | End: 2021-10-26 | Stop reason: HOSPADM

## 2021-10-17 RX ORDER — DEXTROSE MONOHYDRATE 25 G/50ML
12.5 INJECTION, SOLUTION INTRAVENOUS PRN
Status: DISCONTINUED | OUTPATIENT
Start: 2021-10-17 | End: 2021-10-26 | Stop reason: HOSPADM

## 2021-10-17 RX ORDER — SODIUM CHLORIDE 9 MG/ML
25 INJECTION, SOLUTION INTRAVENOUS PRN
Status: DISCONTINUED | OUTPATIENT
Start: 2021-10-17 | End: 2021-10-26 | Stop reason: HOSPADM

## 2021-10-17 RX ORDER — FERROUS SULFATE 325(65) MG
325 TABLET ORAL
Status: DISCONTINUED | OUTPATIENT
Start: 2021-10-18 | End: 2021-10-26 | Stop reason: HOSPADM

## 2021-10-17 RX ORDER — ATORVASTATIN CALCIUM 10 MG/1
20 TABLET, FILM COATED ORAL NIGHTLY
Status: DISCONTINUED | OUTPATIENT
Start: 2021-10-17 | End: 2021-10-26 | Stop reason: HOSPADM

## 2021-10-17 RX ORDER — ALBUTEROL SULFATE 2.5 MG/3ML
2.5 SOLUTION RESPIRATORY (INHALATION)
Status: DISCONTINUED | OUTPATIENT
Start: 2021-10-17 | End: 2021-10-26 | Stop reason: HOSPADM

## 2021-10-17 RX ORDER — NICOTINE POLACRILEX 4 MG
15 LOZENGE BUCCAL PRN
Status: DISCONTINUED | OUTPATIENT
Start: 2021-10-17 | End: 2021-10-26 | Stop reason: HOSPADM

## 2021-10-17 RX ORDER — CLOPIDOGREL BISULFATE 75 MG/1
75 TABLET ORAL DAILY
Status: DISCONTINUED | OUTPATIENT
Start: 2021-10-18 | End: 2021-10-26 | Stop reason: HOSPADM

## 2021-10-17 RX ORDER — POLYETHYLENE GLYCOL 3350 17 G/17G
17 POWDER, FOR SOLUTION ORAL DAILY PRN
Status: DISCONTINUED | OUTPATIENT
Start: 2021-10-17 | End: 2021-10-26 | Stop reason: HOSPADM

## 2021-10-17 RX ORDER — IPRATROPIUM BROMIDE AND ALBUTEROL SULFATE 2.5; .5 MG/3ML; MG/3ML
2 SOLUTION RESPIRATORY (INHALATION) ONCE
Status: COMPLETED | OUTPATIENT
Start: 2021-10-17 | End: 2021-10-17

## 2021-10-17 RX ORDER — ONDANSETRON 4 MG/1
4 TABLET, ORALLY DISINTEGRATING ORAL EVERY 8 HOURS PRN
Status: DISCONTINUED | OUTPATIENT
Start: 2021-10-17 | End: 2021-10-26 | Stop reason: HOSPADM

## 2021-10-17 RX ORDER — PREDNISONE 20 MG/1
40 TABLET ORAL DAILY
Status: DISCONTINUED | OUTPATIENT
Start: 2021-10-20 | End: 2021-10-18

## 2021-10-17 RX ORDER — DEXTROSE MONOHYDRATE 50 MG/ML
100 INJECTION, SOLUTION INTRAVENOUS PRN
Status: DISCONTINUED | OUTPATIENT
Start: 2021-10-17 | End: 2021-10-26 | Stop reason: HOSPADM

## 2021-10-17 RX ADMIN — IPRATROPIUM BROMIDE AND ALBUTEROL SULFATE 2 AMPULE: .5; 3 SOLUTION RESPIRATORY (INHALATION) at 18:50

## 2021-10-17 RX ADMIN — INSULIN LISPRO 2 UNITS: 100 INJECTION, SOLUTION INTRAVENOUS; SUBCUTANEOUS at 23:18

## 2021-10-17 RX ADMIN — VANCOMYCIN HYDROCHLORIDE 750 MG: 750 INJECTION, POWDER, LYOPHILIZED, FOR SOLUTION INTRAVENOUS at 20:43

## 2021-10-17 RX ADMIN — SODIUM CHLORIDE 500 ML: 9 INJECTION, SOLUTION INTRAVENOUS at 23:17

## 2021-10-17 RX ADMIN — SODIUM CHLORIDE: 9 INJECTION, SOLUTION INTRAVENOUS at 23:13

## 2021-10-17 RX ADMIN — METHYLPREDNISOLONE SODIUM SUCCINATE 80 MG: 125 INJECTION, POWDER, FOR SOLUTION INTRAMUSCULAR; INTRAVENOUS at 18:46

## 2021-10-17 RX ADMIN — CEFEPIME 2000 MG: 2 INJECTION, POWDER, FOR SOLUTION INTRAVENOUS at 19:39

## 2021-10-17 ASSESSMENT — PAIN SCALES - GENERAL: PAINLEVEL_OUTOF10: 0

## 2021-10-17 NOTE — ED NOTES
Kallie sent to Dr. Fabiana Estrada at [address] [facility]  10/17/21 1949    Kallie completed with Dr. Fabiana Estrada in the ED at 2015 spoke with Dr. Alida Bell  10/17/21 2055

## 2021-10-17 NOTE — ED PROVIDER NOTES
[address] [facility] ICU      CHIEF COMPLAINT  Shortness of Breath (from facility, per staff, pt noted to have increased WOB, and decreased O2 sat, NRB applied, pt briefly bagged,  sats in 90's placed on Nc and immediately dropped, NRB replaced and squad called, enrout pt on NRB, Bipap placed on arrival)       HISTORY OF PRESENT ILLNESS  Igor Smith is a [de-identified] y.o. female with a past medical history of chronic respiratory failure on 2 L nasal cannula, severe COPD, and CAD who presents to the ED in respiratory distress. She arrives from [facility]. The patient is not able to provide a history, although she does open her eyes to voice and answers questions. Per staff, the patient was noted to have increased work of breathing and appeared short of breath. She was noted to be hypoxic on nasal cannula, so nonrebreather was applied and she apparently was given breaths with bag valve mask. Saturations briefly improved, and she was placed back on a nasal cannula and became hypoxic again. They placed a nonrebreather back on the patient and EMS was called. On arrival here, she does describe shortness of breath. She denies chest pain or fever. She has frequent admissions for same. No other complaints, modifying factors or associated symptoms. I have reviewed the following from the nursing documentation.     Past Medical History:   Diagnosis Date    NADJA (acute kidney injury) ([facility] [address]     Asthma     Chronic anxiety     COPD [facility]chronic obstructive pulmonary disease) ([facility]     GERD (gastroesophageal reflux disease) 9/9/2021    Hyperlipidemia     Hypertension     MRSA (methicillin resistant staph aureus) culture positive 09/22/2019    + resp cx    OA (osteoarthritis) 8/12/2014     Past Surgical History:   Procedure Laterality Date    BRONCHOSCOPY  09/08/2019    Dr. Leonel Torres - w/BAL    CARDIAC CATHETERIZATION  09/05/2019    Dr. Fareed Paul COLONOSCOPY N/A 2/24/2020    COLONOSCOPY DIAGNOSTIC performed by Willie Lawson DO at . [facility] GRAFT N/A 2019    OFF PUMP CORONARY ARTERY BYPASS GRAFTING X2, INTERNAL MAMMARY ARTERY, SAPHENOUS VEIN GRAFT performed by Nilda Georges MD at [facility] CATH  2021    [facility] CATH 2021 [facility] PROCEDURES    MASTECTOMY, PARTIAL Left     SIGMOIDOSCOPY  2020    4 bands    SIGMOIDOSCOPY N/A 2020    FLEX SIG W/ BANDING SIGMOIDOSCOPY DIAGNOSTIC FLEXIBLE performed by Willie Lawson DO at [address] [place] [address] ENDOSCOPY     Family History   Problem Relation Age of Onset    Cancer Mother     Heart Disease Father     Stroke Father     Heart Disease Sister     Stroke Sister      Social History     Socioeconomic History    Marital status:      Spouse name: Not on file    Number of children: Not on file    Years of education: Not on file    Highest education level: Not on file   Occupational History    Not on file   Tobacco Use    Smoking status: Former Smoker     Packs/day: 0.50     Years: 50.00     Pack years: 25.00     Types: Cigarettes     Quit date: 2019     Years since quittin.0    Smokeless tobacco: Never Used    Tobacco comment: advised to quit   Vaping Use    Vaping Use: Never assessed   Substance and Sexual Activity    Alcohol use: No    Drug use: No    Sexual activity: Not Currently     Comment: tabacco- Pk Day   Other Topics Concern    Not on file   Social History Narrative    Not on file     Social Determinants of Health     Financial Resource Strain:     Difficulty of Paying Living Expenses:    Food Insecurity:     Worried About Running Out of Food in the Last Year:     [address] in the Last Year:    Transportation Needs:     Lack of Transportation (Medical):      Lack of Transportation (Non-Medical):    Physical Activity:     Days of Exercise per Week:     Minutes of Exercise per Session:    Stress:     Feeling of Stress :    Social Connections:     Frequency of Communication with Friends and Family:     Frequency of Social Gatherings with Friends and Family:     Attends Yarsanism Services:     Active Member of Clubs or Organizations:     Attends Club or Organization Meetings:     Marital Status:    Intimate Partner Violence:     Fear of Current or Ex-Partner:     Emotionally Abused:     Physically Abused:     Sexually Abused:      Current Facility-Administered Medications   Medication Dose Route Frequency Provider Last Rate Last Admin    insulin lispro (HUMALOG) injection vial 0-6 Units  0-6 Units SubCUTAneous Q4H Andrae Pierre MD        atorvastatin (LIPITOR) tablet 20 mg  20 mg Oral Nightly Andrae Pierre MD        busPIRone (BUSPAR) tablet 10 mg  10 mg Oral TID Andrae Pierre MD        clopidogrel (PLAVIX) tablet 75 mg  75 mg Oral Daily Andrae Pierre MD        ferrous sulfate (IRON 325) tablet 325 mg  325 mg Oral Daily with breakfast Andrae Pierre MD        magnesium oxide (MAG-OX) tablet 400 mg  400 mg Oral Daily Andrae Pierre MD        melatonin disintegrating tablet 5 mg  5 mg Oral Nightly PRN Andrae Pierre MD        metoprolol tartrate (LOPRESSOR) tablet 25 mg  25 mg Oral BID Andrae Pierre MD        pantoprazole (PROTONIX) tablet 20 mg  20 mg Oral Daily Andrae Pierre MD        polyethylene glycol [facility]) packet 17 g  17 g Oral BID Andrae Pierre MD        sertraline (ZOLOFT) tablet 50 mg  50 mg Oral Daily Andrae Pierre MD        ondansetron (ZOFRAN-ODT) disintegrating tablet 4 mg  4 mg Oral Q8H PRN Andrae Pierre MD        Or    ondansetron [facility]) injection 4 mg  4 mg IntraVENous Q6H PRN Andrae Pierre MD        polyethylene glycol [facility]) packet 17 g  17 g Oral Daily PRN Andrae Pierre MD        enoxaparin (LOVENOX) injection 40 mg  40 mg SubCUTAneous Daily Andrae Pierre MD        acetaminophen (TYLENOL) tablet 650 mg  650 mg Oral Q6H PRN Andrae Pierre MD Or    acetaminophen (TYLENOL) suppository 650 mg  650 mg Rectal Q6H PRN Arti Christy MD        albuterol (PROVENTIL) nebulizer solution 2.5 mg  2.5 mg Nebulization Q2H PRN Arti Christy MD        ipratropium-albuterol (DUONEB) nebulizer solution 1 ampule  1 ampule Inhalation Q4H MARIO Perez MD        doxycycline (VIBRAMYCIN) 100 mg in dextrose 5 % 100 mL IVPB  100 mg IntraVENous Q12H Arti Christy MD        cefepime (MAXIPIME) 1000 mg IVPB minibag  1,000 mg IntraVENous Q12H Arti Christy MD        sodium chloride flush 0.9 % injection 10 mL  10 mL IntraVENous 2 times per day Arti Christy MD        sodium chloride flush 0.9 % injection 10 mL  10 mL IntraVENous PRN Arti Christy MD        0.9 % sodium chloride infusion  25 mL IntraVENous PRN Arti Christy MD        methylPREDNISolone sodium (SOLU-MEDROL) injection 40 mg  40 mg IntraVENous Q12H Arti Christy MD        Followed by   [facility] ON 10/20/2021] predniSONE (DELTASONE) tablet 40 mg  40 mg Oral Daily Arti Christy MD        0.9 % sodium chloride infusion   IntraVENous Continuous Arti Christy MD 75 mL/hr at 10/17/21 2313 New Bag at 10/17/21 2313    glucose (GLUTOSE) 40 % oral gel 15 g  15 g Oral PRN Arti Christy MD        dextrose 50 % IV solution  12.5 g IntraVENous PRN Arti Christy MD        glucagon (rDNA) injection 1 mg  1 mg IntraMUSCular PRN Arti Christy MD        dextrose 5 % solution  100 mL/hr IntraVENous PRN Arti Christy MD        vancomycin 1000 mg IVPB in 250 mL D5W addavial  1,000 mg IntraVENous Q24H Arti Christy MD         Allergies   Allergen Reactions    Latex Other (See Comments)     Burning      Codeine Other (See Comments)     \"hallucinations\"       REVIEW OF SYSTEMS  Unable to obtain due to respiratory distress    PHYSICAL EXAM  /65   Pulse 76   Temp 97.9 °F (36.6 °C) (Axillary)   Resp 20   Ht 5' 1\" (1.549 m)   Wt 109 lb (49.4 kg) SpO2 100%   BMI 20.60 kg/m²    Physical exam:  General appearance: Eyes closed however opens eyes to voice. She is distressed. Ill appearing. Skin: Warm and dry. No rashes. Multiple areas of ecchymosis to extremities  HENT: Normocephalic. Atraumatic. Mucus membranes are dry. Neck: supple  Eyes: GUICHO. EOM intact. Heart: Tachycardic rate. Regular rhythm. No murmurs. Lungs: Breath sounds diminished diffusely, faint expiratory wheezes diffusely. Increased work of breathing and accessory muscle use present. Abdomen: No tenderness. Soft. Non distended. No peritoneal signs. Musculoskeletal: No extremity edema. Compartments soft. No deformity. No tenderness in the extremities. All extremities neurovascularly intact. Radial, Dp, and PT pulses +2/4 bilaterally   Neurological: Eyes closed however opens eyes to voice, oriented to person place and time. No focal deficits. No aphasia or dysarthria. Psychiatric: anxious mood and affect. LABS  I have reviewed all labs for this visit.    Results for orders placed or performed during the hospital encounter of 10/17/21   COVID-19, Rapid    Specimen: Nasopharyngeal Swab   Result Value Ref Range    SARS-CoV-2, NAAT Not Detected Not Detected   CBC Auto Differential   Result Value Ref Range    WBC 9.7 4.0 - 11.0 K/uL    RBC 3.64 (L) 4.00 - 5.20 M/uL    Hemoglobin 10.3 (L) 12.0 - 16.0 g/dL    Hematocrit 34.2 (L) 36.0 - 48.0 %    MCV 94.0 80.0 - 100.0 fL    MCH 28.4 26.0 - 34.0 pg    MCHC 30.2 (L) 31.0 - 36.0 g/dL    RDW 22.1 (H) 12.4 - 15.4 %    Platelets 234 277 - 674 K/uL    MPV 8.3 5.0 - 10.5 fL    Neutrophils % 78.3 %    Lymphocytes % 18.8 %    Monocytes % 1.5 %    Eosinophils % 0.1 %    Basophils % 1.3 %    Neutrophils Absolute 7.6 1.7 - 7.7 K/uL    Lymphocytes Absolute 1.8 1.0 - 5.1 K/uL    Monocytes Absolute 0.1 0.0 - 1.3 K/uL    Eosinophils Absolute 0.0 0.0 - 0.6 K/uL    Basophils Absolute 0.1 0.0 - 0.2 K/uL   Comprehensive Metabolic Panel w/ Reflex to Arterial 98.9 >92 %    Carboxyhgb, Arterial 0.3 0.0 - 1.5 %    Methemoglobin, Arterial 0.3 <1.5 %    TCO2, Arterial 28.9 Not Established mmol/L    O2 Therapy See comment    POCT Glucose   Result Value Ref Range    POC Glucose 230 (H) 70 - 99 mg/dl    Performed on ACCU-CHEK    EKG 12 Lead   Result Value Ref Range    Ventricular Rate 100 BPM    Atrial Rate 100 BPM    P-R Interval 130 ms    QRS Duration 66 ms    Q-T Interval 358 ms    QTc Calculation (Bazett) 461 ms    P Axis 77 degrees    R Axis -47 degrees    T Axis -55 degrees    Diagnosis       Sinus rhythm with Premature supraventricular complexesLeft atrial enlargementLeft axis deviationAnteroseptal infarct , age undeterminedST & T wave abnormality, consider inferior ischemiaAbnormal ECGWhen compared with ECG of 12-OCT-2021 03:20,Significant changes have occurred       ECG  The Ekg interpreted by me shows  normal sinus rhythm with a rate of 100  Axis is   Normal  QTc is  within an acceptable range  Intervals and Durations are unremarkable. ST Segments: non specific ST abnormality poor baseline       RADIOLOGY  XR CHEST PORTABLE    Result Date: 10/17/2021  EXAMINATION: ONE XRAY VIEW OF THE CHEST 10/17/2021 12:23 pm COMPARISON: October 14, 2021 HISTORY: ORDERING SYSTEM PROVIDED HISTORY: short of breath TECHNOLOGIST PROVIDED HISTORY: Reason for exam:->short of breath Reason for Exam: shortness of breath Acuity: Acute Type of Exam: Initial FINDINGS: Large lung volumes are again noted, with diffuse interstitial prominence again demonstrated, consistent with underlying interstitial lung disease. Small pleural effusions are noted. Focal airspace disease is present within the right perihilar region, and may represent superimposed infection or atypical pattern of edema. Improved aeration is present within the left lung base. No pneumothorax noted. Heart size and mediastinal contours are stable. Osseous structures are stable.      1. Patchy airspace disease, right perihilar region, suggesting pneumonia or atelectasis 2. Small pleural effusions 3. Improved aeration present within the left lung base, consistent with resolving atelectasis and or pneumonia 4. Underlying interstitial pulmonary fibrosis     XR CHEST PORTABLE    Result Date: 10/14/2021  EXAMINATION: ONE XRAY VIEW OF THE CHEST 10/14/2021 7:58 am COMPARISON: CHEST X-RAY DATED 10/12/2021 HISTORY: ORDERING SYSTEM PROVIDED HISTORY: intubated TECHNOLOGIST PROVIDED HISTORY: Reason for exam:->intubated Reason for Exam: short of breath Acuity: Unknown Type of Exam: Unknown FINDINGS: Interval removal of enteric tube and ET tube. Small left pleural effusion. Chronic interstitial lung disease with improved appearance of pulmonary edema. Cardiac silhouette is unremarkable. No pneumothorax. Few clips overlying the left lower chest..     Interval removal of enteric tube and ET tube. Small left pleural effusion. Improved appearance bilateral lungs. XR CHEST PORTABLE    Result Date: 10/12/2021  EXAMINATION: ONE XRAY VIEW OF THE CHEST 10/12/2021 7:47 am COMPARISON: 10/12/2021 HISTORY: ORDERING SYSTEM PROVIDED HISTORY: tube placement TECHNOLOGIST PROVIDED HISTORY: Reason for exam:->tube placement Reason for Exam: tube placement Acuity: Acute Type of Exam: Initial FINDINGS: Tip of ET tube is seen at the level of the midthoracic trachea NG-tube has been advanced. The tip now projects at the level of the GE junction. Proximal side-port hole is above the GE junction Scattered opacities are seen throughout the left and right lung, slightly decreased compared to prior. Superimposed pleural effusions are seen. Tip of NG tube is at the GE junction. Consider further advancement by approximately 8-9 cm Slight improved aeration of the lungs. Bilateral pleuroparenchymal disease remains.      XR CHEST PORTABLE    Result Date: 10/12/2021  EXAMINATION: ONE XRAY VIEW OF THE CHEST 10/12/2021 2:48 am COMPARISON: 09/28/2021 and 06/01/2021 HISTORY: ORDERING SYSTEM PROVIDED HISTORY: Respiratory failure,  intubated TECHNOLOGIST PROVIDED HISTORY: Reason for exam:->Respiratory failure,  intubated Reason for Exam: respitory failure ett og Acuity: Acute Type of Exam: Initial FINDINGS: An endotracheal tube is identified with the tip approximately 3 cm above the brayden. An enteric tube is in place with the tip and side hole in the esophagus. Interstitial thickening is again noted and may represent chronic lung disease. However this has progressed since June and a component of pulmonary edema is suspected. There is acute airspace disease in the right perihilar region and right base. There is also minimal airspace opacity in the left base. The heart size is normal.  There is no evidence for pleural effusion or pneumothorax. 1. The endotracheal tube tip is approximately 3 cm above the brayden. 2. Enteric tube remains in the esophagus. The tube should be advanced 10 cm. 3. Right lung pneumonia. 4. Left basilar atelectasis or pneumonia. 5. Suspect pulmonary edema superimposed on chronic interstitial lung disease. XR CHEST PORTABLE    Result Date: 9/28/2021  EXAMINATION: ONE XRAY VIEW OF THE CHEST 9/28/2021 6:14 am COMPARISON: 09/23/2021 HISTORY: ORDERING SYSTEM PROVIDED HISTORY: SOB TECHNOLOGIST PROVIDED HISTORY: Reason for exam:->SOB Reason for Exam: sob Acuity: Acute Type of Exam: Initial FINDINGS: Heart size is normal.  Mediastinal contours are normal.  Pulmonary vascularity is normal.  Heterogeneous opacity is seen throughout the lungs bilaterally. , slightly decreased compared to prior. Superimposed pleural effusions are seen There is underlying emphysema. Improved aeration of the  lungs.   Bilateral airspace disease and pleural effusions remain     XR CHEST PORTABLE    Result Date: 9/23/2021  EXAMINATION: ONE XRAY VIEW OF THE CHEST 9/23/2021 5:14 pm COMPARISON: 09/20/2021 HISTORY: ORDERING SYSTEM PROVIDED HISTORY: shortness of breath TECHNOLOGIST PROVIDED HISTORY: Reason for exam:->shortness of breath Reason for Exam: sob cough FINDINGS: Lordotic positioning. Status post coronary bypass. Heart size and pulmonary vessels normal.  Bilateral ground-glass and interstitial opacities. Thickening of the pleural stripe in the right costophrenic region. Left-sided midline catheter noted     Bilateral lung opacities suspicious for atypical/viral pattern of pneumonia. Interstitial edema could appear similar     XR CHEST PORTABLE    Result Date: 9/20/2021  EXAMINATION: ONE XRAY VIEW OF THE CHEST 9/20/2021 7:28 am COMPARISON: Chest x-ray dated 09/19/2021 HISTORY: ORDERING SYSTEM PROVIDED HISTORY: Daily while on vent/BIPAP. TECHNOLOGIST PROVIDED HISTORY: Reason for exam:->Daily while on vent/BIPAP. Reason for Exam: daily while on vent/bipap Acuity: Acute Type of Exam: Ongoing FINDINGS: Right IJ catheter with tip in the SVC. Small bilateral pleural effusions. Mild pulmonary edema. Hyperinflated lungs. Cardiac silhouette is unremarkable. Visualized osseous structures are unremarkable. Right IJ catheter with tip in the SVC. Small bilateral pleural effusions. Mild pulmonary edema. XR CHEST PORTABLE    Result Date: 9/19/2021  EXAMINATION: ONE XRAY VIEW OF THE CHEST 9/19/2021 6:59 am COMPARISON: 09/18/2021 HISTORY: ORDERING SYSTEM PROVIDED HISTORY: Daily while on vent/BIPAP. TECHNOLOGIST PROVIDED HISTORY: Reason for exam:->Daily while on vent/BIPAP. Reason for Exam: daily on bipap Acuity: Acute Type of Exam: Ongoing Respiratory difficulty. FINDINGS: Heart size is normal.  Small amount of opacity in the left retrocardiac area. Pulmonary vasculature is mildly to moderately prominent and more prominent than yesterday. Mild-to-moderate underlying bullous changes. Several mediastinal surgical clips. Right internal jugular central venous catheter remains. Mild-to-moderate pulmonary vascular congestion worse since yesterday.   Mild left retrocardiac atelectasis or less likely pneumonia. Mild-to-moderate underlying bullous changes. XR CHEST PORTABLE    Result Date: 9/18/2021  EXAMINATION: ONE XRAY VIEW OF THE CHEST 9/18/2021 7:01 am COMPARISON: 09/17/2021 radiograph HISTORY: ORDERING SYSTEM PROVIDED HISTORY: Daily while on vent/BIPAP. TECHNOLOGIST PROVIDED HISTORY: Reason for exam:->Daily while on vent/BIPAP. Reason for Exam: daily while on bipap Acuity: Acute Type of Exam: Initial FINDINGS: Right IJ central venous catheter stable. Heart, mediastinum and pulmonary vascularity are normal.  The lungs are moderately hyperinflated suggesting underlying COPD. Mild reticular opacities are observed diffusely. No skeletal findings. In this patient with probable changes of COPD, there are stable diffuse reticular opacities which may represent mild pulmonary edema. Underlying pneumonitis is not excluded. CT CHEST PULMONARY EMBOLISM W CONTRAST    Result Date: 10/12/2021  EXAMINATION: CTA OF THE CHEST 10/12/2021 4:35 am TECHNIQUE: CTA of the chest was performed after the administration of intravenous contrast.  Multiplanar reformatted images are provided for review. MIP images are provided for review. Dose modulation, iterative reconstruction, and/or weight based adjustment of the mA/kV was utilized to reduce the radiation dose to as low as reasonably achievable.  COMPARISON: Portable chest from 10/12/2021, CT chest from 09/23/2021 HISTORY: ORDERING SYSTEM PROVIDED HISTORY: Respiratory failure, tachycardia TECHNOLOGIST PROVIDED HISTORY: Reason for exam:->Respiratory failure, tachycardia Decision Support Exception - unselect if not a suspected or confirmed emergency medical condition->Emergency Medical Condition (MA) Reason for Exam: respitory intubated Acuity: Acute Type of Exam: Initial 41-year-old female with respiratory failure; tachycardia FINDINGS: Pulmonary Arteries: No obvious filling defect identified in the pulmonary arterial vasculature that meets THE CHEST 9/23/2021 8:49 pm TECHNIQUE: CTA of the chest was performed after the administration of intravenous contrast.  Multiplanar reformatted images are provided for review. MIP images are provided for review. Dose modulation, iterative reconstruction, and/or weight based adjustment of the mA/kV was utilized to reduce the radiation dose to as low as reasonably achievable. COMPARISON: 09/18/2020 HISTORY: ORDERING SYSTEM PROVIDED HISTORY: r/o Pulmonary Embolism TECHNOLOGIST PROVIDED HISTORY: Reason for exam:->r/o Pulmonary Embolism Decision Support Exception - unselect if not a suspected or confirmed emergency medical condition->Emergency Medical Condition (MA) Reason for Exam: sob FINDINGS: Pulmonary Arteries: The right and left pulmonary arteries are mildly dilated. The pulmonary arteries are well opacified to the subsegmental levels. There are no pulmonary emboli identified. Mediastinum: Thoracic aorta is tortuous. There is no aneurysm. There is mild cardiomegaly. No mediastinal or hilar adenopathy. Previous sternotomy. Lungs/pleura: There is advanced centrilobular emphysema. There may be a superimposed interstitial opacities. There are small bilateral pleural effusions, layer to the apices. Dependent atelectasis is noted in both lower lobes. Upper Abdomen: There is nonspecific thickening of the adrenal glands. Upper abdomen is otherwise unremarkable. Soft Tissues/Bones: No acute osseous abnormality. Mild edema in the subcutaneous soft tissue. No acute pulmonary embolus. Small bilateral pleural effusions and interstitial opacities, suggestive of pulmonary edema, superimposed on fibrosis and emphysema. Mild bibasilar atelectasis. Mild cardiomegaly. Mild prominence of the central pulmonary arteries. This may be associated with pulmonary arterial hypertension. ED COURSE/MDM  Patient seen and evaluated. Old records reviewed. Labs and imaging reviewed and results discussed with patient.       This is an 58-year-old female presenting with shortness of breath. She has frequent admissions for same. She arrives tachypneic, on a nonrebreather. She has a history of hypercapnic respiratory failure and is placed on BiPAP immediately on arrival she does well with this as far as her oxygenation. She is relatively awake and alert on arrival, she is protecting her airway. She is initially tachycardic and tachypneic, this improves with BiPAP. She is remained normotensive. She is hypercapnic with a PCO2 of 73, pH is 7.19 on venous blood gas. She is given Solu-Medrol and duo nebs. Her x-ray does show questionable lobar infiltrate and she does have a leukocytosis and elevated procalcitonin therefore she is given vancomycin and cefepime for hospital-acquired pneumonia. Blood cultures were sent. Lactate initially 3.1. After being on BiPAP her blood gas does significantly improved to a pH that is normalized and PCO2 49. Tachycardia also improved. The patient is awake and alert on BiPAP. Of note she did have a peripheral IV placed in her left hand, and developed a large and rapidly expanding hematoma ultimately there was breakage of skin and a large clot that extruded, pressure was applied to the hand, patient remained neurovascularly intact distally, she was placed in finger traps and arm was elevated. The swelling did improve and she maintained brisk capillary refill throughout her stay in the ED. She will be admitted for further treatment. I spoke with Dr. Fabiana Estrada who accepts. The total Critical Care time is 39 minutes which excludes separately billable procedures.       During the patient's ED course, the patient was given:  Medications   atorvastatin (LIPITOR) tablet 20 mg (20 mg Oral Not Given 10/17/21 2220)   busPIRone (BUSPAR) tablet 10 mg (10 mg Oral Not Given 10/17/21 2220)   clopidogrel (PLAVIX) tablet 75 mg (has no administration in time range)   ferrous sulfate (IRON 325) tablet 325 mg (has no administration in time range)   magnesium oxide (MAG-OX) tablet 400 mg (has no administration in time range)   melatonin disintegrating tablet 5 mg (has no administration in time range)   metoprolol tartrate (LOPRESSOR) tablet 25 mg (25 mg Oral Not Given 10/17/21 2221)   pantoprazole (PROTONIX) tablet 20 mg (has no administration in time range)   polyethylene glycol (GLYCOLAX) packet 17 g (17 g Oral Not Given 10/17/21 2220)   sertraline (ZOLOFT) tablet 50 mg (has no administration in time range)   ondansetron (ZOFRAN-ODT) disintegrating tablet 4 mg (has no administration in time range)     Or   ondansetron (ZOFRAN) injection 4 mg (has no administration in time range)   polyethylene glycol (GLYCOLAX) packet 17 g (has no administration in time range)   enoxaparin (LOVENOX) injection 40 mg (has no administration in time range)   acetaminophen (TYLENOL) tablet 650 mg (has no administration in time range)     Or   acetaminophen (TYLENOL) suppository 650 mg (has no administration in time range)   albuterol (PROVENTIL) nebulizer solution 2.5 mg (has no administration in time range)   ipratropium-albuterol (DUONEB) nebulizer solution 1 ampule (has no administration in time range)   doxycycline (VIBRAMYCIN) 100 mg in dextrose 5 % 100 mL IVPB (has no administration in time range)   cefepime (MAXIPIME) 1000 mg IVPB minibag (has no administration in time range)   sodium chloride flush 0.9 % injection 10 mL (10 mLs IntraVENous Not Given 10/17/21 2219)   sodium chloride flush 0.9 % injection 10 mL (has no administration in time range)   0.9 % sodium chloride infusion (has no administration in time range)   methylPREDNISolone sodium (SOLU-MEDROL) injection 40 mg (has no administration in time range)     Followed by   predniSONE (DELTASONE) tablet 40 mg (has no administration in time range)   0.9 % sodium chloride infusion ( IntraVENous New Bag 10/17/21 2313)   glucose (GLUTOSE) 40 % oral gel 15 g (has no administration in time range)   dextrose 50 % IV solution (has no administration in time range)   glucagon (rDNA) injection 1 mg (has no administration in time range)   dextrose 5 % solution (has no administration in time range)   vancomycin 1000 mg IVPB in 250 mL D5W addavial (has no administration in time range)   insulin lispro (HUMALOG) injection vial 0-6 Units (has no administration in time range)   ipratropium-albuterol (DUONEB) nebulizer solution 2 ampule (2 ampules Inhalation Given 10/17/21 1850)   methylPREDNISolone sodium (SOLU-MEDROL) injection 80 mg (80 mg IntraVENous Given 10/17/21 1846)   vancomycin (VANCOCIN) 750 mg in dextrose 5 % 250 mL IVPB (0 mg IntraVENous Stopped 10/17/21 2216)   cefepime (MAXIPIME) 2000 mg IVPB minibag (0 mg IntraVENous Stopped 10/17/21 2043)   0.9 % sodium chloride bolus (0 mLs IntraVENous Stopped 10/18/21 0017)        CLINICAL IMPRESSION  1. Acute on chronic respiratory failure with hypoxia and hypercapnia (HCC)    2. HCAP (healthcare-associated pneumonia)    3. Septicemia ([facility]    4. Hyperglycemia        Blood pressure 120/65, pulse 76, temperature 97.9 °F (36.6 °C), temperature source Axillary, resp. rate 20, height 5' 1\" (1.549 m), weight 109 lb (49.4 kg), SpO2 100 %, not currently breastfeeding. Patient was given scripts for the following medications. I counseled patient how to take these medications. Current Discharge Medication List          Follow-up with:  No follow-up provider specified. DISCLAIMER: This chart was created using Dragon dictation software. Efforts were made by me to ensure accuracy, however some errors may be present due to limitations of this technology and occasionally words are not transcribed correctly.        Whitney [place]  10/18/21 0133

## 2021-10-17 NOTE — ED NOTES
Kallie sent to Dr. Shania Mckeon at [facility]  10/17/21 [id number]

## 2021-10-18 LAB
A/G RATIO: 1.3 (ref 1.1–2.2)
ALBUMIN SERPL-MCNC: 3.2 G/DL (ref 3.4–5)
ALP BLD-CCNC: 74 U/L (ref 40–129)
ALT SERPL-CCNC: 15 U/L (ref 10–40)
ANION GAP SERPL CALCULATED.3IONS-SCNC: 9 MMOL/L (ref 3–16)
AST SERPL-CCNC: 14 U/L (ref 15–37)
BASOPHILS ABSOLUTE: 0 K/UL (ref 0–0.2)
BASOPHILS RELATIVE PERCENT: 0.1 %
BILIRUB SERPL-MCNC: 0.4 MG/DL (ref 0–1)
BUN BLDV-MCNC: 15 MG/DL (ref 7–20)
CALCIUM SERPL-MCNC: 8.4 MG/DL (ref 8.3–10.6)
CHLORIDE BLD-SCNC: 107 MMOL/L (ref 99–110)
CO2: 30 MMOL/L (ref 21–32)
CREAT SERPL-MCNC: 0.8 MG/DL (ref 0.6–1.2)
EKG ATRIAL RATE: 100 BPM
EKG DIAGNOSIS: NORMAL
EKG P AXIS: 77 DEGREES
EKG P-R INTERVAL: 130 MS
EKG Q-T INTERVAL: 358 MS
EKG QRS DURATION: 66 MS
EKG QTC CALCULATION (BAZETT): 461 MS
EKG R AXIS: -47 DEGREES
EKG T AXIS: -55 DEGREES
EKG VENTRICULAR RATE: 100 BPM
EOSINOPHILS ABSOLUTE: 0 K/UL (ref 0–0.6)
EOSINOPHILS RELATIVE PERCENT: 0 %
ESTIMATED AVERAGE GLUCOSE: 105.4 MG/DL
GFR AFRICAN AMERICAN: >60
GFR NON-AFRICAN AMERICAN: >60
GLOBULIN: 2.5 G/DL
GLUCOSE BLD-MCNC: 103 MG/DL (ref 70–99)
GLUCOSE BLD-MCNC: 107 MG/DL (ref 70–99)
GLUCOSE BLD-MCNC: 133 MG/DL (ref 70–99)
GLUCOSE BLD-MCNC: 134 MG/DL (ref 70–99)
GLUCOSE BLD-MCNC: 136 MG/DL (ref 70–99)
GLUCOSE BLD-MCNC: 230 MG/DL (ref 70–99)
HBA1C MFR BLD: 5.3 %
HCT VFR BLD CALC: 24.3 % (ref 36–48)
HEMOGLOBIN: 7.5 G/DL (ref 12–16)
LACTIC ACID: 1.9 MMOL/L (ref 0.4–2)
LACTIC ACID: 2.1 MMOL/L (ref 0.4–2)
LYMPHOCYTES ABSOLUTE: 0.5 K/UL (ref 1–5.1)
LYMPHOCYTES RELATIVE PERCENT: 8.1 %
MCH RBC QN AUTO: 29.2 PG (ref 26–34)
MCHC RBC AUTO-ENTMCNC: 31 G/DL (ref 31–36)
MCV RBC AUTO: 94.3 FL (ref 80–100)
MONOCYTES ABSOLUTE: 0.1 K/UL (ref 0–1.3)
MONOCYTES RELATIVE PERCENT: 1.5 %
NEUTROPHILS ABSOLUTE: 5.7 K/UL (ref 1.7–7.7)
NEUTROPHILS RELATIVE PERCENT: 90.3 %
PDW BLD-RTO: 21.6 % (ref 12.4–15.4)
PERFORMED ON: ABNORMAL
PLATELET # BLD: 243 K/UL (ref 135–450)
PMV BLD AUTO: 8 FL (ref 5–10.5)
POTASSIUM REFLEX MAGNESIUM: 3.9 MMOL/L (ref 3.5–5.1)
RBC # BLD: 2.58 M/UL (ref 4–5.2)
SODIUM BLD-SCNC: 146 MMOL/L (ref 136–145)
TOTAL PROTEIN: 5.7 G/DL (ref 6.4–8.2)
TROPONIN: 0.02 NG/ML
WBC # BLD: 6.3 K/UL (ref 4–11)

## 2021-10-18 PROCEDURE — 85025 COMPLETE CBC W/AUTO DIFF WBC: CPT

## 2021-10-18 PROCEDURE — 6370000000 HC RX 637 (ALT 250 FOR IP): Performed by: INTERNAL MEDICINE

## 2021-10-18 PROCEDURE — 99291 CRITICAL CARE FIRST HOUR: CPT | Performed by: INTERNAL MEDICINE

## 2021-10-18 PROCEDURE — 2500000003 HC RX 250 WO HCPCS: Performed by: INTERNAL MEDICINE

## 2021-10-18 PROCEDURE — 36415 COLL VENOUS BLD VENIPUNCTURE: CPT

## 2021-10-18 PROCEDURE — 93010 ELECTROCARDIOGRAM REPORT: CPT | Performed by: INTERNAL MEDICINE

## 2021-10-18 PROCEDURE — 94761 N-INVAS EAR/PLS OXIMETRY MLT: CPT

## 2021-10-18 PROCEDURE — 2060000000 HC ICU INTERMEDIATE R&B

## 2021-10-18 PROCEDURE — 80053 COMPREHEN METABOLIC PANEL: CPT

## 2021-10-18 PROCEDURE — 2700000000 HC OXYGEN THERAPY PER DAY

## 2021-10-18 PROCEDURE — 51798 US URINE CAPACITY MEASURE: CPT

## 2021-10-18 PROCEDURE — 2580000003 HC RX 258: Performed by: INTERNAL MEDICINE

## 2021-10-18 PROCEDURE — 83605 ASSAY OF LACTIC ACID: CPT

## 2021-10-18 PROCEDURE — 94640 AIRWAY INHALATION TREATMENT: CPT

## 2021-10-18 PROCEDURE — 6360000002 HC RX W HCPCS: Performed by: INTERNAL MEDICINE

## 2021-10-18 PROCEDURE — 94660 CPAP INITIATION&MGMT: CPT

## 2021-10-18 PROCEDURE — 84484 ASSAY OF TROPONIN QUANT: CPT

## 2021-10-18 PROCEDURE — 99232 SBSQ HOSP IP/OBS MODERATE 35: CPT | Performed by: INTERNAL MEDICINE

## 2021-10-18 RX ORDER — BUSPIRONE HYDROCHLORIDE 10 MG/1
10 TABLET ORAL 3 TIMES DAILY PRN
Status: ON HOLD | COMMUNITY
Start: 2021-05-12 | End: 2021-10-18

## 2021-10-18 RX ORDER — POTASSIUM CHLORIDE 750 MG/1
TABLET, FILM COATED, EXTENDED RELEASE ORAL
Status: ON HOLD | COMMUNITY
Start: 2021-10-17 | End: 2021-10-18 | Stop reason: SDUPTHER

## 2021-10-18 RX ORDER — DOCUSATE SODIUM 100 MG/1
100 CAPSULE, LIQUID FILLED ORAL 2 TIMES DAILY
Status: ON HOLD | COMMUNITY
Start: 2021-05-12 | End: 2021-10-18

## 2021-10-18 RX ORDER — PREDNISONE 20 MG/1
40 TABLET ORAL DAILY
Status: DISCONTINUED | OUTPATIENT
Start: 2021-10-18 | End: 2021-10-21

## 2021-10-18 RX ORDER — FUROSEMIDE 20 MG/1
20 TABLET ORAL
Status: ON HOLD | COMMUNITY
Start: 2021-05-19 | End: 2021-10-18 | Stop reason: SDUPTHER

## 2021-10-18 RX ORDER — LORAZEPAM 0.5 MG/1
0.5 TABLET ORAL NIGHTLY PRN
Status: ON HOLD | COMMUNITY
Start: 2021-08-25 | End: 2022-03-30

## 2021-10-18 RX ORDER — CLOPIDOGREL BISULFATE 75 MG/1
75 TABLET ORAL DAILY
Status: ON HOLD | COMMUNITY
Start: 2021-04-11 | End: 2021-10-18 | Stop reason: SDUPTHER

## 2021-10-18 RX ADMIN — IPRATROPIUM BROMIDE AND ALBUTEROL SULFATE 1 AMPULE: .5; 2.5 SOLUTION RESPIRATORY (INHALATION) at 20:00

## 2021-10-18 RX ADMIN — PANTOPRAZOLE SODIUM 20 MG: 20 TABLET, DELAYED RELEASE ORAL at 09:13

## 2021-10-18 RX ADMIN — BUSPIRONE HYDROCHLORIDE 10 MG: 10 TABLET ORAL at 20:01

## 2021-10-18 RX ADMIN — IPRATROPIUM BROMIDE AND ALBUTEROL SULFATE 1 AMPULE: .5; 2.5 SOLUTION RESPIRATORY (INHALATION) at 23:53

## 2021-10-18 RX ADMIN — DOXYCYCLINE 100 MG: 100 INJECTION, POWDER, LYOPHILIZED, FOR SOLUTION INTRAVENOUS at 01:17

## 2021-10-18 RX ADMIN — IPRATROPIUM BROMIDE AND ALBUTEROL SULFATE 1 AMPULE: .5; 2.5 SOLUTION RESPIRATORY (INHALATION) at 15:31

## 2021-10-18 RX ADMIN — ACETAMINOPHEN 650 MG: 325 TABLET ORAL at 20:01

## 2021-10-18 RX ADMIN — ATORVASTATIN CALCIUM 20 MG: 10 TABLET, FILM COATED ORAL at 20:01

## 2021-10-18 RX ADMIN — METOPROLOL TARTRATE 25 MG: 25 TABLET, FILM COATED ORAL at 09:13

## 2021-10-18 RX ADMIN — SODIUM CHLORIDE, PRESERVATIVE FREE 10 ML: 5 INJECTION INTRAVENOUS at 20:05

## 2021-10-18 RX ADMIN — POLYETHYLENE GLYCOL (3350) 17 G: 17 POWDER, FOR SOLUTION ORAL at 20:01

## 2021-10-18 RX ADMIN — CEFEPIME HYDROCHLORIDE 1000 MG: 1 INJECTION, POWDER, FOR SOLUTION INTRAMUSCULAR; INTRAVENOUS at 17:52

## 2021-10-18 RX ADMIN — METOPROLOL TARTRATE 25 MG: 25 TABLET, FILM COATED ORAL at 20:01

## 2021-10-18 RX ADMIN — ENOXAPARIN SODIUM 40 MG: 40 INJECTION SUBCUTANEOUS at 08:53

## 2021-10-18 RX ADMIN — IPRATROPIUM BROMIDE AND ALBUTEROL SULFATE 1 AMPULE: .5; 2.5 SOLUTION RESPIRATORY (INHALATION) at 07:25

## 2021-10-18 RX ADMIN — VANCOMYCIN HYDROCHLORIDE 1000 MG: 1 INJECTION, POWDER, LYOPHILIZED, FOR SOLUTION INTRAVENOUS at 08:52

## 2021-10-18 RX ADMIN — POLYETHYLENE GLYCOL (3350) 17 G: 17 POWDER, FOR SOLUTION ORAL at 09:43

## 2021-10-18 RX ADMIN — SODIUM CHLORIDE 25 ML: 9 INJECTION, SOLUTION INTRAVENOUS at 23:17

## 2021-10-18 RX ADMIN — MAGNESIUM GLUCONATE 500 MG ORAL TABLET 400 MG: 500 TABLET ORAL at 09:13

## 2021-10-18 RX ADMIN — DOXYCYCLINE 100 MG: 100 INJECTION, POWDER, LYOPHILIZED, FOR SOLUTION INTRAVENOUS at 10:25

## 2021-10-18 RX ADMIN — BUSPIRONE HYDROCHLORIDE 10 MG: 10 TABLET ORAL at 09:13

## 2021-10-18 RX ADMIN — CLOPIDOGREL BISULFATE 75 MG: 75 TABLET ORAL at 09:13

## 2021-10-18 RX ADMIN — DOXYCYCLINE 100 MG: 100 INJECTION, POWDER, LYOPHILIZED, FOR SOLUTION INTRAVENOUS at 23:18

## 2021-10-18 RX ADMIN — SODIUM CHLORIDE, PRESERVATIVE FREE 10 ML: 5 INJECTION INTRAVENOUS at 09:39

## 2021-10-18 RX ADMIN — PREDNISONE 40 MG: 20 TABLET ORAL at 09:39

## 2021-10-18 RX ADMIN — FERROUS SULFATE TAB 325 MG (65 MG ELEMENTAL FE) 325 MG: 325 (65 FE) TAB at 09:13

## 2021-10-18 RX ADMIN — BUSPIRONE HYDROCHLORIDE 10 MG: 10 TABLET ORAL at 13:12

## 2021-10-18 RX ADMIN — IPRATROPIUM BROMIDE AND ALBUTEROL SULFATE 1 AMPULE: .5; 2.5 SOLUTION RESPIRATORY (INHALATION) at 11:54

## 2021-10-18 RX ADMIN — CEFEPIME HYDROCHLORIDE 1000 MG: 1 INJECTION, POWDER, FOR SOLUTION INTRAMUSCULAR; INTRAVENOUS at 06:25

## 2021-10-18 RX ADMIN — SERTRALINE HYDROCHLORIDE 50 MG: 50 TABLET ORAL at 09:13

## 2021-10-18 ASSESSMENT — PAIN SCALES - GENERAL
PAINLEVEL_OUTOF10: 0
PAINLEVEL_OUTOF10: 0
PAINLEVEL_OUTOF10: 3
PAINLEVEL_OUTOF10: 3
PAINLEVEL_OUTOF10: 0

## 2021-10-18 ASSESSMENT — PAIN DESCRIPTION - DESCRIPTORS: DESCRIPTORS: ACHING

## 2021-10-18 ASSESSMENT — PAIN DESCRIPTION - LOCATION: LOCATION: HEAD

## 2021-10-18 ASSESSMENT — PAIN DESCRIPTION - PAIN TYPE: TYPE: ACUTE PAIN

## 2021-10-18 NOTE — CARE COORDINATION
Case Management Assessment  Initial Evaluation      Patient Name: Nando Artis  YOB: 1940  Diagnosis: Acute on chronic respiratory failure with hypoxia and hypercapnia ([facility] [N39.51, J96.22]  Date / Time: 10/17/2021  5:32 PM    Admission status/Date:INPT 10/17/21  Chart Reviewed: Yes      Patient Interviewed: No   Family Interviewed:  Yes - Shaye lopez 426      Hospitalization in the last 30 days:  Yes      Health Care Decision Maker :   Primary Decision MakeShawnrenetta Mujica - Child - [phone]    Secondary Decision Maker: Hafsa Tapia - Niece/Nephew - [phone]    (CM - must 1st enter selection under Navigator - emergency contact- [place] Relationship and pick relationship)   Who do you trust or have selected to make healthcare decisions for you      Met with: pt Juan lopez, via telephone  Interview conducted  (bedside/phone):    Current PCP: Mason Raman required for SNF : Y, N          3 night stay required - Y, N    ADLS  Support Systems/Care Needs: Family Members  Transportation: EMS transportation    Meal Preparation: per facility    Housing  Living Arrangements: [facility]  Steps: n/a  Intent for return to present living arrangements: Yes  Identified Issues: -    [address] : No Agency:(Services)  Type of Home Care Services: None  Passport/Waiver : No  :                      Phone Number:    Passport/Waiver Services: -          Durable Medical Equiptment   DME Provider: per facility  Equipment: trilogy  Walker___Cane___RTS___ BSC___Shower Chair___Hospital Bed___W/C____Other________  02 at ____Liter(s)---wears(frequency)_______ [facility] ___ CPAP___ BiPap___   N/A____      Home O2 Use :  Yes    If No for home O2---if presently on O2 during hospitalization:  Yes  if yes CM to follow for potential DC O2 need  Informed of need for care provider to bring portable home O2 tank on day of discharge for nursing to connect prior to leaving:   Not Indicated  Verbalized agreement/Understanding:   Not Indicated    Community Service Affiliation  Dialysis:  No    · Agency:  · Location:  · Dialysis Schedule:  · Phone:   · Fax: Other Community Services: (ex:PT/OT,Mental Health,Wound Clinic, Cardio/Pul [address])    DISCHARGE PLAN: Explained Case Management role/services. Reviewed chart and spoke with pt marylour/Juan escudero, who states that pt is from [facility] and will return there at discharge. Courtney Kerns states that they are not interested at this time in Hospice care as they want all life saving measures continued for pt at this time. CM spoke with Julius Salgado at [facility] who states that pt can return at discharge and will need a negative Covid test within 72 hrs of discharge. Julius Salgado states that they do not offer Palliative services at their facility. CM will cont to follow.

## 2021-10-18 NOTE — PROGRESS NOTES
10/18/21 0000   RT Protocol   History Pulmonary Disease 2   Respiratory pattern 4   Breath sounds 2   Cough 0   Indications for Bronchodilator Therapy Decreased or absent breath sounds   Bronchodilator Assessment Score 8

## 2021-10-18 NOTE — PROGRESS NOTES
Patient lying in bed offers no c/o daughter remains at bedside IV site unremarkable denies pain/discomfort at this time

## 2021-10-18 NOTE — CONSULTS
Pharmacy Note  Vancomycin Consult    Amadou Patel is a [de-identified] y.o. female started on Vancomycin for HAP; consult received from Dr. Phani Rojas to manage therapy. Also receiving the following antibiotics: cefepime, doxycycline.     Patient Active Problem List   Diagnosis    Hyperlipidemia    Asthma    OA (osteoarthritis)    Tobacco use    COPD exacerbation (HCC)    Sepsis (HCC)    Abnormal chest x-ray    COPD with acute exacerbation (HCC)    Shortness of breath    Tobacco abuse    Moderate malnutrition (HCC)    NSTEMI (non-ST elevated myocardial infarction) (HCC)    Elevated troponin    Acute respiratory failure with hypoxia (HCC)    CAD (coronary artery disease)    Acute pulmonary edema (HCC)    Pulmonary infiltrate    Elevated brain natriuretic peptide (BNP) level    Leukocytosis    Ischemic cardiomyopathy    Acute combined systolic and diastolic congestive heart failure (HCC)    Hypernatremia    NADJA (acute kidney injury) ([address]    Status post aorto-coronary artery bypass graft    Mucus plugging of bronchi    CAD in native artery    S/P CABG (coronary artery bypass graft)    Pneumonia of both lower lobes due to methicillin resistant Staphylococcus aureus (MRSA) (HCC)    GI bleed    Severe malnutrition (HCC)    Chest pain    Chronic respiratory failure with hypoxia (HCC)    Essential hypertension    Anemia    Acute respiratory failure (HCC)    Acute respiratory distress    Volume overload    Demand ischemia (HCC)    GERD (gastroesophageal reflux disease)    Acute respiratory failure with hypoxia and hypercapnia (HCC)    Shock ([address]    Pneumonia due to infectious organism    Acute on chronic respiratory failure with hypoxia and hypercapnia (HCC)    Chronic obstructive pulmonary disease (HCC)    Acute on chronic respiratory failure with hypoxemia (HCC)    Chronic anxiety    Congestive heart failure ([address]    Acute respiratory failure with hypoxia and hypercarbia (HCC)    Acute kidney injury ([address]    Elevated procalcitonin COPD, severe ([facility]    Anxiety    Severe anxiety    Severe protein-calorie malnutrition (HCC)     Allergies:  Latex and Codeine     Recent Labs     10/17/21  1805   BUN 15   CREATININE 0.9   WBC 9.7       Intake/Output Summary (Last 24 hours) at 10/17/2021 2348  Last data filed at 10/17/2021 2216  Gross per 24 hour   Intake 110 ml   Output 0 ml   Net 110 ml       Ht Readings from Last 1 Encounters:   10/17/21 5' 1\" (1.549 m)        Wt Readings from Last 1 Encounters:   10/17/21 109 lb (49.4 kg)       Body mass index is 20.6 kg/m². Estimated Creatinine Clearance: 38 mL/min (based on SCr of 0.9 mg/dL). Goal Trough Level: 15-20 mcg/mL  Goal AUC: 400-600    Assessment/Plan:  Will initiate Vancomycin with a one time loading dose of 750 mg x1, followed by 1000 mg IV every 24 hours. Timing of trough level will be determined based on culture results, renal function, and clinical response. Per Pk-insight:   BZR08,XY: 006 mg/L.hr  Probability of AUC24 > 400: 78 %  Ctrough,ss: 15.2 mg/L  Probability of Ctrough,ss > 20: 23 %  Probability of nephrotoxicity (Lodise JORY 2009): 10 %    Trough 10/19 @ 0730. Thank you for the consult. Will continue to follow.     Gloria Rodriguez, LeandroD, [facility], 10/17/2021 11:50 PM

## 2021-10-18 NOTE — PROGRESS NOTES
Bedside report and transfer of care given to [facility]. Pt currently resting in bed with the call light within reach. Pt denies any other care needs at this time. Pt stable at this time.

## 2021-10-18 NOTE — PROGRESS NOTES
Admit: 10/17/2021    Name:  Alicia Rojas  Room:  [facility]-  MRN:    [id number]    Critical Care Daily Progress Note for 10/18/2021     Admitted with acute on chronic respiratory failure with hypoxia and hypercarbia    Interval History:     She was on BiPAP last night.   Currently on 2 L of oxygen    Scheduled Meds:   insulin lispro  0-6 Units SubCUTAneous Q4H    predniSONE  40 mg Oral Daily    atorvastatin  20 mg Oral Nightly    busPIRone  10 mg Oral TID    clopidogrel  75 mg Oral Daily    ferrous sulfate  325 mg Oral Daily with breakfast    magnesium oxide  400 mg Oral Daily    metoprolol tartrate  25 mg Oral BID    pantoprazole  20 mg Oral Daily    polyethylene glycol  17 g Oral BID    sertraline  50 mg Oral Daily    enoxaparin  40 mg SubCUTAneous Daily    ipratropium-albuterol  1 ampule Inhalation Q4H WA    doxycycline (VIBRAMYCIN) IV  100 mg IntraVENous Q12H    cefepime  1,000 mg IntraVENous Q12H    sodium chloride flush  10 mL IntraVENous 2 times per day    vancomycin  1,000 mg IntraVENous Q24H       Continuous Infusions:   sodium chloride      dextrose         PRN Meds:  melatonin, ondansetron **OR** ondansetron, polyethylene glycol, acetaminophen **OR** acetaminophen, albuterol, sodium chloride flush, sodium chloride, glucose, dextrose, glucagon (rDNA), dextrose                  Objective:     Temp  Av °F (36.7 °C)  Min: 97.3 °F (36.3 °C)  Max: 98.5 °F (36.9 °C)  Pulse  Av  Min: 70  Max: 116  BP  Min: 103/61  Max: 166/67  SpO2  Av.6 %  Min: 94 %  Max: 100 %  FiO2   Av.8 %  Min: 40 %  Max: 60 %  Patient Vitals for the past 4 hrs:   BP Temp Temp src Pulse Resp SpO2   10/18/21 1300    82 25 99 %   10/18/21 1200 (!) 148/67   76 22 100 %   10/18/21 1156      99 %   10/18/21 1100 (!) 148/65 98.5 °F (36.9 °C) Oral 76 24 100 %         Intake/Output Summary (Last 24 hours) at 10/18/2021 1406  Last data filed at 10/18/2021 1300  Gross per 24 hour   Intake 1566.3 ml   Output 450 ml   Net 1116.3 ml       Physical Exam:  General: Appears chronically ill. Mucous Membranes:  Pink , anicteric  Neck: No JVD, no carotid bruit, no thyromegaly  Chest: Normal respiratory effort, mild wheezing. No crackles. Cardiovascular:  RRR S1S2 heard, no murmurs or gallops  Abdomen:  Soft, undistended, non tender, no organomegaly, BS present  Extremities: No edema or cyanosis. Distal pulses well felt  Neurological : Awake alert and oriented    Lab Data:  CBC:   Recent Labs     10/17/21  1805 10/18/21  0417   WBC 9.7 6.3   RBC 3.64* 2.58*   HGB 10.3* 7.5*   HCT 34.2* 24.3*   MCV 94.0 94.3   RDW 22.1* 21.6*    243     BMP:   Recent Labs     10/17/21  1805 10/18/21  0417    146*   K 4.8 3.9    107   CO2 28 30   BUN 15 15   CREATININE 0.9 0.8     BNP: No results for input(s): BNP in the last 72 hours. PT/INR:   Recent Labs     10/17/21  1805   PROTIME 10.3   INR 0.91     APTT:No results for input(s): APTT in the last 72 hours. CARDIAC ENZYMES:   Recent Labs     10/17/21  1805 10/18/21  0417   TROPONINI 0.03* 0.02*     FASTING LIPID PANEL:  Lab Results   Component Value Date    CHOL 146 11/14/2019    HDL 63 11/14/2019    TRIG 110 11/14/2019     LIVER PROFILE:   Recent Labs     10/17/21  1805 10/18/21  0417   AST 22 14*   ALT 20 15   BILITOT 0.4 0.4   ALKPHOS 107 74         XR CHEST PORTABLE   Final Result   1. Patchy airspace disease, right perihilar region, suggesting pneumonia or   atelectasis   2. Small pleural effusions   3. Improved aeration present within the left lung base, consistent with   resolving atelectasis and or pneumonia   4.  Underlying interstitial pulmonary fibrosis               Assessment & Plan:     Patient Active Problem List    Diagnosis Date Noted    HCAP (healthcare-associated pneumonia)     Severe anxiety     Severe protein-calorie malnutrition (HCC)     COPD, severe ([address]     Anxiety     Acute respiratory failure with hypoxia and hypercarbia ([address] 09/28/2021    Acute kidney injury ([address]     Elevated procalcitonin     Congestive heart failure (HCC)     Chronic anxiety     Acute on chronic respiratory failure with hypoxemia ([address] 09/23/2021    Chronic obstructive pulmonary disease (HCC)     Acute on chronic respiratory failure with hypoxia and hypercapnia (HCC)     Acute respiratory failure with hypoxia and hypercapnia (HCC) 09/16/2021    Shock ([address]     Pneumonia due to infectious organism     Acute respiratory distress 09/09/2021    Volume overload 09/09/2021    Demand ischemia ([address] 09/09/2021    GERD (gastroesophageal reflux disease) 09/09/2021    Severe malnutrition ([address] 08/31/2021    Acute respiratory failure ([address] 08/30/2021    Chronic respiratory failure with hypoxia (HCC)     Essential hypertension     Anemia     Chest pain 09/18/2020    GI bleed 02/23/2020    Moderate malnutrition ([address] 09/25/2019    S/P CABG (coronary artery bypass graft) 09/25/2019    Pneumonia of both lower lobes due to methicillin resistant Staphylococcus aureus (MRSA) ([address] 09/25/2019    CAD in native artery 09/24/2019    Mucus plugging of bronchi     Status post aorto-coronary artery bypass graft     NADJA (acute kidney injury) ([address]     Hypernatremia 09/14/2019    Ischemic cardiomyopathy     Acute combined systolic and diastolic congestive heart failure (HCC)     Acute respiratory failure with hypoxia ([address] 09/08/2019    CAD (coronary artery disease) 09/08/2019    Acute pulmonary edema ([address] 09/08/2019    Pulmonary infiltrate 09/08/2019    Elevated brain natriuretic peptide (BNP) level 09/08/2019    Leukocytosis 09/08/2019    Elevated troponin 09/04/2019    NSTEMI (non-ST elevated myocardial infarction) ([address] 09/03/2019    Abnormal chest x-ray     COPD with acute exacerbation (HCC)     Shortness of breath     Tobacco abuse     Sepsis ([address] 03/18/2019    COPD exacerbation ([address] 12/29/2017    OA (osteoarthritis) 08/12/2014    Tobacco use 08/12/2014    Hyperlipidemia 05/07/2013    Asthma 05/07/2013       Acute on chronic hypoxic and hypercarbic respiratory failure. Secondary to acute COPD exacerbation possible healthcare associated pneumonia. BiPAP at night. Currently on 2 L of oxygen. Continue prednisone 40 mg daily. Continue cefepime and vancomycin day #2. And doxycycline day #1. #CAD  #HTN  - cont lopressor, statin, plavix     #GERD  - cont PPI     #Severe malnutrition  #Generalized weakness and debility        DVT Prophylaxis: Lovenox   Diet: general  Code Status: Full Code     Transfer to PCU.     Lacey Calvert MD

## 2021-10-18 NOTE — PROGRESS NOTES
10:16 PM  Patient admitted to ICU for acute on chronic respiratory failure. Telemetry monitor hooked up to patient, NSR on monitor but irregular at times. Breathing pattern appears unlabored, lung sounds diminished, patient on BiPAP with a setting of 16/5 and 50% FiO2. A&O x4, BUE strength is weak, BLE strength is weak, follows commands. +1 pitting edema to BLE. CHG bath provided for patient. Patient not able to demonstrate the ability to move to and from a reclining position. Meds administered, see MAR. [address] [facility] called unit, updated on pt status. Admission questions complete. No other needs at this time. 1:08 AM  Reassessment completed. FiO2 down to 40%. Pt resting quietly in bed w/o complaints. 4:43 AM  Reassessment completed. Lab unable to draw blood, Charge RN obtained labs. No other changes. 7:08 AM  Pt had not voided all shift, bladder scan showed 338 ml. Pt then said she had to pee, placed on bed pan and voided 250 ml. EOS report given to Heather Hawk RN. Pt stable at this time.

## 2021-10-18 NOTE — ACP (ADVANCE CARE PLANNING)
Advance Care Planning   Healthcare Decision Maker:    Primary Decision Maker: Dee Rascon Child - [phone]    Secondary Decision Maker: Tai Jennifertyler - Niece/Nephew - [phone]    Click here to complete Healthcare Decision Makers including selection of the Healthcare Decision Maker Relationship (ie \"Primary\").

## 2021-10-18 NOTE — PROGRESS NOTES
10/18/21 0301   NIV Type   Equipment Type v60   Mode Bilevel   Mask Type Full face mask   Mask Size Small   Settings/Measurements   IPAP 16 cmH20   CPAP/EPAP 5 cmH2O   Rate Ordered 16   Resp 22   FiO2  40 %   Vt Exhaled 467 mL   Minute Volume 8.2 Liters   Mask Leak (lpm) 16 lpm   Comfort Level Good   Using Accessory Muscles No   SpO2 96   Breath Sounds   Right Upper Lobe Diminished   Right Middle Lobe Diminished   Right Lower Lobe Diminished   Left Upper Lobe Diminished   Left Lower Lobe Diminished   Alarm Settings   Alarms On Y   Press Low Alarm 5 cmH2O   High Pressure Alarm 30 cmH2O   Delay Alarm 20 sec(s)   Resp Rate Low Alarm 16   High Respiratory Rate 40 br/min

## 2021-10-18 NOTE — PROGRESS NOTES
Pt transferred to PCU from ICU. Family at bedside. O/ax4. Oriented to room and call light system. Pt denies any other care needs at this time. Pt stable at this time.     Jericho Riley RN

## 2021-10-18 NOTE — PROGRESS NOTES
Cefepime Day _1___    Dx: HAP, COPD Exacerbation  Ordered by: Maximino Nolan  Other Abx: Doxycycline IV    Recent Labs     10/17/21  1805   CREATININE 0.9       Estimated Creatinine Clearance: 38 mL/min (based on SCr of 0.9 mg/dL). Recent Labs     10/17/21  1805   WBC 9.7       >60  (normal recommended dosing schedule) 1000 mg every 12 hours 2000 mg every 12 hours 1000 mg every 6 hours 2000 mg every 8 hours   30-60 1000 mg every 24 hours 1000 mg every 12 hours 1000 mg every 8 hours* 2000 mg every 12 hours   11-29  500 mg every 24 hours 1000 mg every 24 hours 1000 mg every 12 hours 2000 mg every 24 hours   <11 250 mg every 24 hours 500 mg every 24 hours 1000 mg every 24 hours 1000 mg every 24 hours     Dose decreased to 1000 mg q12h. Pharmacy will continue to monitor.      MARIO ALBERTO [facility] 10/17/2021 10:55 PM

## 2021-10-18 NOTE — PROGRESS NOTES
Q4 hour assessment complete, pt would like additional fans brought to bedside as needed, pt currently has one at bedside which was turned off at her request, pts daughter at bedside as well.

## 2021-10-18 NOTE — PROGRESS NOTES
4 Eyes Skin Assessment     The patient is being assess for   Admission    I agree that 2 RN's have performed a thorough Head to Toe Skin Assessment on the patient. ALL assessment sites listed below have been assessed. Areas assessed by both nurses:   [x]   Head, Face, and Ears   [x]   Shoulders, Back, and Chest, Abdomen  [x]   Arms, Elbows, and Hands   [x]   Coccyx, Sacrum, and Ischium  [x]   Legs, Feet, and Heels        -St. II to sacrum: foam dressing placed  -Blanchable redness to L heel: foam dressings placed on bilateral heels & heels elevated with pillow  -Scattered bruising    Co-signer eSignature: Electronically signed by Ravi Summers RN on 10/17/21 at 11:36 PM EDT    Does the Patient have Skin Breakdown?   Yes [facility] WOUND CARE was Initiated documentation include the Amanda-wound, Wound Assessment, Measurements, Dressing Treatment, Drainage, and Color\",          Trey Prevention initiated:  Yes   Wound Care Orders initiated:  Yes      [address]  nurse consulted for Pressure Injury (Stage 3,4, Unstageable, DTI, NWPT, Complex wounds)and New or Established Ostomies:  Yes      Primary Nurse eSignature: Electronically signed by Amaya Bauer RN on 10/17/21 at 11:07 PM EDT

## 2021-10-18 NOTE — PROGRESS NOTES
10/18/21 0000   RT Protocol   Bronchodilator Assessment Score 8   RT Inhaler-Nebulizer Bronchodilator Protocol Note    There is a bronchodilator order in the chart from a provider indicating to follow the RT Bronchodilator Protocol and there is an Initiate RT Inhaler-Nebulizer Bronchodilator Protocol order as well (see protocol at bottom of note). CXR Findings:  XR CHEST PORTABLE    Result Date: 10/17/2021  1. Patchy airspace disease, right perihilar region, suggesting pneumonia or atelectasis 2. Small pleural effusions 3. Improved aeration present within the left lung base, consistent with resolving atelectasis and or pneumonia 4. Underlying interstitial pulmonary fibrosis       The findings from the last RT Protocol Assessment were as follows:   History Pulmonary Disease: Chronic pulmonary disease  Respiratory Pattern: Mild dyspnea at rest, irregular pattern, or RR 21-25 bpm  Breath Sounds: Slightly diminished and/or crackles  Cough: Strong, spontaneous, non-productive  Indication for Bronchodilator Therapy: Decreased or absent breath sounds  Bronchodilator Assessment Score: 8    Aerosolized bronchodilator medication orders have been revised according to the RT Inhaler-Nebulizer Bronchodilator Protocol below. Respiratory Therapist to perform RT Therapy Protocol Assessment initially then follow the protocol. Repeat RT Therapy Protocol Assessment PRN for score 0-3 or on second treatment, BID, and PRN for scores above 3. No Indications  adjust the frequency to every 6 hours PRN wheezing or bronchospasm, if no treatments needed after 48 hours then discontinue using Per Protocol order mode. If indication present, adjust the RT bronchodilator orders based on the Bronchodilator Assessment Score as indicated below.   Use Inhaler orders unless patient has one or more of the following: on home nebulizer, not able to hold breath for 10 seconds, is not alert and oriented, cannot activate and use MDI correctly, or respiratory rate 25 breaths per minute or more, then use the equivalent nebulizer order(s) with same Frequency and PRN reasons based on the score. If a patient is on this medication at home then do not decrease Frequency below that used at home. 0-3  enter or revise RT bronchodilator order(s) to equivalent RT Bronchodilator order with Frequency of every 4 hours PRN for wheezing or increased work of breathing using Per Protocol order mode. 4-6  enter or revise RT Bronchodilator order(s) to two equivalent RT bronchodilator orders with one order with BID Frequency and one order with Frequency of every 4 hours PRN wheezing or increased work of breathing using Per Protocol order mode. 7-10  enter or revise RT Bronchodilator order(s) to two equivalent RT bronchodilator orders with one order with TID Frequency and one order with Frequency of every 4 hours PRN wheezing or increased work of breathing using Per Protocol order mode. 11-13  enter or revise RT Bronchodilator order(s) to one equivalent RT bronchodilator order with QID Frequency and an Albuterol order with Frequency of every 4 hours PRN wheezing or increased work of breathing using Per Protocol order mode. Greater than 13  enter or revise RT Bronchodilator order(s) to one equivalent RT bronchodilator order with every 4 hours Frequency and an Albuterol order with Frequency of every 2 hours PRN wheezing or increased work of breathing using Per Protocol order mode.          Electronically signed by Ector Ladd RCP on 10/18/2021 at 12:51 AM

## 2021-10-18 NOTE — PROGRESS NOTES
Pts am assessment complete, pt awake alert oriented to self and situation, no pain at present, VSS, 0900 medications administered.

## 2021-10-18 NOTE — PROGRESS NOTES
4 Eyes Skin Assessment     The patient is being assess for   Transfer to PCU    I agree that 2 RN's have performed a thorough Head to Toe Skin Assessment on the patient. ALL assessment sites listed below have been assessed. Areas assessed for pressure by both nurses:   [x]   Head, Face, and Ears   [x]   Shoulders, Back, and Chest, Abdomen  [x]   Arms, Elbows, and Hands   [x]   Coccyx, Sacrum, and Ischium  [x]   Legs, Feet, and Heels    Scattered bruising and abrasions, Skin tear to LUE and LLE. Blanchable redness to heels; mepilex placed for prevenative. Skin Assessed Under all Medical Devices by both nurses: none               All Mepilex Borders were peeled back and area peeked at by both nurses:    Please list where Mepilex Borders are located:  Sacrum and heels             **SHARE this note so that the co-signing nurse is able to place an eSignature**    Co-signer eSignature: Electronically signed by Hector Fontana RN on 10/18/21 at 2:22 PM EDT    Does the Patient have Skin Breakdown related to pressure?  Stage II on admission on coccyx            Trey Prevention initiated:  Yes   Wound Care Orders initiated:  No      WOC nurse consulted for Pressure Injury (Stage 3,4, Unstageable, DTI, NWPT, Complex wounds)and New or Established Ostomies:  No      Primary Nurse eSignature: Electronically signed by Napoleon Jarquin RN on 10/18/21 at 1:47 PM EDT

## 2021-10-18 NOTE — PROGRESS NOTES
RT Inhaler-Nebulizer Bronchodilator Protocol Note    There is a bronchodilator order in the chart from a provider indicating to follow the RT Bronchodilator Protocol and there is an Initiate RT Inhaler-Nebulizer Bronchodilator Protocol order as well (see protocol at bottom of note). CXR Findings:  XR CHEST PORTABLE    Result Date: 10/17/2021  1. Patchy airspace disease, right perihilar region, suggesting pneumonia or atelectasis 2. Small pleural effusions 3. Improved aeration present within the left lung base, consistent with resolving atelectasis and or pneumonia 4. Underlying interstitial pulmonary fibrosis       The findings from the last RT Protocol Assessment were as follows:   History Pulmonary Disease: (P) Chronic pulmonary disease  Respiratory Pattern: (P) Dyspnea on exertion or RR 21-25 bpm  Breath Sounds: (P) Slightly diminished and/or crackles  Cough: (P) Strong, spontaneous, non-productive  Indication for Bronchodilator Therapy: Decreased or absent breath sounds  Bronchodilator Assessment Score: (P) 6    Aerosolized bronchodilator medication orders have been revised according to the RT Inhaler-Nebulizer Bronchodilator Protocol below. Respiratory Therapist to perform RT Therapy Protocol Assessment initially then follow the protocol. Repeat RT Therapy Protocol Assessment PRN for score 0-3 or on second treatment, BID, and PRN for scores above 3. No Indications  adjust the frequency to every 6 hours PRN wheezing or bronchospasm, if no treatments needed after 48 hours then discontinue using Per Protocol order mode. If indication present, adjust the RT bronchodilator orders based on the Bronchodilator Assessment Score as indicated below.   Use Inhaler orders unless patient has one or more of the following: on home nebulizer, not able to hold breath for 10 seconds, is not alert and oriented, cannot activate and use MDI correctly, or respiratory rate 25 breaths per minute or more, then use the equivalent nebulizer order(s) with same Frequency and PRN reasons based on the score. If a patient is on this medication at home then do not decrease Frequency below that used at home. 0-3  enter or revise RT bronchodilator order(s) to equivalent RT Bronchodilator order with Frequency of every 4 hours PRN for wheezing or increased work of breathing using Per Protocol order mode. 4-6  enter or revise RT Bronchodilator order(s) to two equivalent RT bronchodilator orders with one order with BID Frequency and one order with Frequency of every 4 hours PRN wheezing or increased work of breathing using Per Protocol order mode. 7-10  enter or revise RT Bronchodilator order(s) to two equivalent RT bronchodilator orders with one order with TID Frequency and one order with Frequency of every 4 hours PRN wheezing or increased work of breathing using Per Protocol order mode. 11-13  enter or revise RT Bronchodilator order(s) to one equivalent RT bronchodilator order with QID Frequency and an Albuterol order with Frequency of every 4 hours PRN wheezing or increased work of breathing using Per Protocol order mode. Greater than 13  enter or revise RT Bronchodilator order(s) to one equivalent RT bronchodilator order with every 4 hours Frequency and an Albuterol order with Frequency of every 2 hours PRN wheezing or increased work of breathing using Per Protocol order mode.          Electronically signed by Juwan Daniel RCP on 10/18/2021 at 11:58 AM

## 2021-10-18 NOTE — H&P
Hospital Medicine History & Physical      PCP: Lorelei Stovall MD    Date of Service: Pt seen/examined on 10/17/21 and admitted on 10/17/21 to Inpatient    Chief Complaint   Patient presents with    Shortness of Breath     from facility, per staff, pt noted to have increased WOB, and decreased O2 sat, NRB applied, pt briefly bagged,  sats in 90's placed on Nc and immediately dropped, NRB replaced and squad called, enrout pt on NRB, Bipap placed on arrival       History Of Present Illness: The patient is a [de-identified] y.o. female with PMH below, presents with SOB, increased O2 demand, resp distress, frequent admissions for resp problems. Pt presents from SNF where she had marked hypoxia. Family at bedside reports pt's sats in 19's at SNF. She required NRB mask and also ambu bag for a short time per ED. She was was placed on BIPAP in the ED which she continues on. Limited ability to obtain additional Hx 2/2 pt being on BIPAP as it is difficult to hear/understand her. However, was able to determine pt is alert and oriented.       Past Medical History:        Diagnosis Date    NADJA (acute kidney injury) ([facility] [address]     Asthma     Chronic anxiety     [facility]     GERD (gastroesophageal reflux disease) 9/9/2021    Hyperlipidemia     Hypertension     MRSA (methicillin resistant staph aureus) culture positive 09/22/2019    + resp cx    OA (osteoarthritis) 8/12/2014       Past Surgical History:        Procedure Laterality Date    BRONCHOSCOPY  09/08/2019    Dr. Wilton Travis - w/BAL   [address]  09/05/2019    Dr. Jaime Peña 2/24/2020    COLONOSCOPY DIAGNOSTIC performed by Kim Tejeda DO at [address] [place] N/A 9/19/2019    OFF PUMP CORONARY ARTERY BYPASS GRAFTING X2, INTERNAL MAMMARY ARTERY, SAPHENOUS VEIN GRAFT performed by Bill Pang MD at [facility] 9/20/2021    IR MIDLINE CATH 9/20/2021 [facility] SPECIAL PROCEDURES    MASTECTOMY, PARTIAL Left     SIGMOIDOSCOPY  02/27/2020    4 bands    SIGMOIDOSCOPY N/A 2/27/2020    FLEX SIG W/ BANDING SIGMOIDOSCOPY DIAGNOSTIC FLEXIBLE performed by Josie Diaz DO at [address] [place] [address]       Medications Prior to Admission:    Prior to Admission medications    Medication Sig Start Date End Date Taking? Authorizing Provider   predniSONE (DELTASONE) 10 MG tablet 3 tabs a day for 5 days,  Then 2 tabs a day for 5 days ,  Then 1 tab daily 10/14/21   Any Juárez MD   miconazole nitrate 2 % OINT Apply topically 2 times daily 10/14/21   Any Juárez MD   busPIRone (BUSPAR) 10 MG tablet Take 10 mg by mouth 3 times daily Indications: Feeling Anxious    Historical Provider, MD   ondansetron (ZOFRAN-ODT) 4 MG disintegrating tablet Take 1 tablet by mouth every 8 hours as needed for Nausea or Vomiting 9/27/21   Fina Chase MD   furosemide (LASIX) 20 MG tablet Take 1 tablet by mouth See Admin Instructions Tuesday, friday 9/27/21   Fina Chase MD   magnesium oxide (MAG-OX) 400 (241.3 Mg) MG TABS tablet Take 1 tablet by mouth daily 9/28/21   Fina Chase MD   potassium & sodium phosphates (PHOS-NAK) 280-160-250 MG PACK Take 1 packet by mouth daily 9/27/21   Fina Chase MD   menthol-zinc oxide (CALMOSEPTINE) 0.44-20.6 % OINT ointment Apply topically 2 times daily as needed (Apply to buttocks until resolved) Max 30 ml per day.  9/14/21   Reymundo Cruz MD   polyethylene glycol (GLYCOLAX) 17 g packet Take 17 g by mouth 2 times daily 9/14/21   Reymundo Cruz MD   melatonin 5 MG TBDP disintegrating tablet Take 1 tablet by mouth nightly as needed (sleep) 9/14/21   Reymundo Cruz MD   fluticasone [facility]) 50 MCG/ACT nasal spray 1 spray by Each Nostril route daily as needed for Rhinitis 9/14/21   Reymundo Cruz MD   pantoprazole (PROTONIX) 20 MG tablet Take 20 mg by mouth daily    Historical follows:        Problem Relation Age of Onset    Cancer Mother     Heart Disease Father     Stroke Father     Heart Disease Sister     Stroke Sister        REVIEW OF SYSTEMS:   Pertinent positives/negatives as follows: SOB, increased O2 demand, resp distress, frequent admissions for resp problems, and as discussed in HPI, otherwise a complete ROS performed and all other systems are negative. PHYSICAL EXAM PERFORMED:  /84   Pulse 90   Temp 98.2 °F (36.8 °C) (Temporal)   Resp 22   Ht 5' 1\" (1.549 m)   Wt 106 lb 3.2 oz (48.2 kg)   SpO2 100%   BMI 20.07 kg/m²     GEN:  A&Ox3. Increased WOB, speaking in 2-3 word sentences. On NIV. HEENT:  NC/AT,EOMI, dry MM, no erythema/exudates or visible masses. CVS:  Normal S1,S2. RRR. Without M/G/R.   LUNG:   Faint wheezes bilat but otherwise diminished. Course mechanical sounds centrally. no rales or rhonchi. Tachypnea. ABD:  Soft, ND/NT, BS+ x4. Without G/R.  EXT: 2+ pulses, no c/c. 1+ edema BLE. Brisk cap refill. PSY:  Thought process intact, affect appropriate. MAMADOU:  CN III-XII intact, moves all 4 spontaneously, sensory grossly intact. SKIN: No rash or lesions on visible skin. Chart review shows recent radiographs:  XR CHEST PORTABLE    Result Date: 10/17/2021  EXAMINATION: ONE XRAY VIEW OF THE CHEST 10/17/2021 12:23 pm COMPARISON: October 14, 2021 HISTORY: ORDERING SYSTEM PROVIDED HISTORY: short of breath TECHNOLOGIST PROVIDED HISTORY: Reason for exam:->short of breath Reason for Exam: shortness of breath Acuity: Acute Type of Exam: Initial FINDINGS: Large lung volumes are again noted, with diffuse interstitial prominence again demonstrated, consistent with underlying interstitial lung disease. Small pleural effusions are noted. Focal airspace disease is present within the right perihilar region, and may represent superimposed infection or atypical pattern of edema. Improved aeration is present within the left lung base.   No pneumothorax noted.  Heart size and mediastinal contours are stable. Osseous structures are stable. 1. Patchy airspace disease, right perihilar region, suggesting pneumonia or atelectasis 2. Small pleural effusions 3. Improved aeration present within the left lung base, consistent with resolving atelectasis and or pneumonia 4. Underlying interstitial pulmonary fibrosis     XR CHEST PORTABLE    Result Date: 10/14/2021  EXAMINATION: ONE XRAY VIEW OF THE CHEST 10/14/2021 7:58 am COMPARISON: CHEST X-RAY DATED 10/12/2021 HISTORY: ORDERING SYSTEM PROVIDED HISTORY: intubated TECHNOLOGIST PROVIDED HISTORY: Reason for exam:->intubated Reason for Exam: short of breath Acuity: Unknown Type of Exam: Unknown FINDINGS: Interval removal of enteric tube and ET tube. Small left pleural effusion. Chronic interstitial lung disease with improved appearance of pulmonary edema. Cardiac silhouette is unremarkable. No pneumothorax. Few clips overlying the left lower chest..     Interval removal of enteric tube and ET tube. Small left pleural effusion. Improved appearance bilateral lungs. XR CHEST PORTABLE    Result Date: 10/12/2021  EXAMINATION: ONE XRAY VIEW OF THE CHEST 10/12/2021 7:47 am COMPARISON: 10/12/2021 HISTORY: ORDERING SYSTEM PROVIDED HISTORY: tube placement TECHNOLOGIST PROVIDED HISTORY: Reason for exam:->tube placement Reason for Exam: tube placement Acuity: Acute Type of Exam: Initial FINDINGS: Tip of ET tube is seen at the level of the midthoracic trachea NG-tube has been advanced. The tip now projects at the level of the GE junction. Proximal side-port hole is above the GE junction Scattered opacities are seen throughout the left and right lung, slightly decreased compared to prior. Superimposed pleural effusions are seen. Tip of NG tube is at the GE junction. Consider further advancement by approximately 8-9 cm Slight improved aeration of the lungs. Bilateral pleuroparenchymal disease remains.      XR CHEST PORTABLE    Result Date: 10/12/2021  EXAMINATION: ONE XRAY VIEW OF THE CHEST 10/12/2021 2:48 am COMPARISON: 09/28/2021 and 06/01/2021 HISTORY: ORDERING SYSTEM PROVIDED HISTORY: Respiratory failure,  intubated TECHNOLOGIST PROVIDED HISTORY: Reason for exam:->Respiratory failure,  intubated Reason for Exam: respitory failure ett og Acuity: Acute Type of Exam: Initial FINDINGS: An endotracheal tube is identified with the tip approximately 3 cm above the brayden. An enteric tube is in place with the tip and side hole in the esophagus. Interstitial thickening is again noted and may represent chronic lung disease. However this has progressed since June and a component of pulmonary edema is suspected. There is acute airspace disease in the right perihilar region and right base. There is also minimal airspace opacity in the left base. The heart size is normal.  There is no evidence for pleural effusion or pneumothorax. 1. The endotracheal tube tip is approximately 3 cm above the brayden. 2. Enteric tube remains in the esophagus. The tube should be advanced 10 cm. 3. Right lung pneumonia. 4. Left basilar atelectasis or pneumonia. 5. Suspect pulmonary edema superimposed on chronic interstitial lung disease. XR CHEST PORTABLE    Result Date: 9/28/2021  EXAMINATION: ONE XRAY VIEW OF THE CHEST 9/28/2021 6:14 am COMPARISON: 09/23/2021 HISTORY: ORDERING SYSTEM PROVIDED HISTORY: SOB TECHNOLOGIST PROVIDED HISTORY: Reason for exam:->SOB Reason for Exam: sob Acuity: Acute Type of Exam: Initial FINDINGS: Heart size is normal.  Mediastinal contours are normal.  Pulmonary vascularity is normal.  Heterogeneous opacity is seen throughout the lungs bilaterally. , slightly decreased compared to prior. Superimposed pleural effusions are seen There is underlying emphysema. Improved aeration of the  lungs.   Bilateral airspace disease and pleural effusions remain     XR CHEST PORTABLE    Result Date: 9/23/2021  EXAMINATION: ONE XRAY VIEW OF THE CHEST 9/23/2021 5:14 pm COMPARISON: 09/20/2021 HISTORY: ORDERING SYSTEM PROVIDED HISTORY: shortness of breath TECHNOLOGIST PROVIDED HISTORY: Reason for exam:->shortness of breath Reason for Exam: sob cough FINDINGS: Lordotic positioning. Status post coronary bypass. Heart size and pulmonary vessels normal.  Bilateral ground-glass and interstitial opacities. Thickening of the pleural stripe in the right costophrenic region. Left-sided midline catheter noted     Bilateral lung opacities suspicious for atypical/viral pattern of pneumonia. Interstitial edema could appear similar     XR CHEST PORTABLE    Result Date: 9/20/2021  EXAMINATION: ONE XRAY VIEW OF THE CHEST 9/20/2021 7:28 am COMPARISON: Chest x-ray dated 09/19/2021 HISTORY: ORDERING SYSTEM PROVIDED HISTORY: Daily while on vent/BIPAP. TECHNOLOGIST PROVIDED HISTORY: Reason for exam:->Daily while on vent/BIPAP. Reason for Exam: daily while on vent/bipap Acuity: Acute Type of Exam: Ongoing FINDINGS: Right IJ catheter with tip in the SVC. Small bilateral pleural effusions. Mild pulmonary edema. Hyperinflated lungs. Cardiac silhouette is unremarkable. Visualized osseous structures are unremarkable. Right IJ catheter with tip in the SVC. Small bilateral pleural effusions. Mild pulmonary edema. XR CHEST PORTABLE    Result Date: 9/19/2021  EXAMINATION: ONE XRAY VIEW OF THE CHEST 9/19/2021 6:59 am COMPARISON: 09/18/2021 HISTORY: ORDERING SYSTEM PROVIDED HISTORY: Daily while on vent/BIPAP. TECHNOLOGIST PROVIDED HISTORY: Reason for exam:->Daily while on vent/BIPAP. Reason for Exam: daily on bipap Acuity: Acute Type of Exam: Ongoing Respiratory difficulty. FINDINGS: Heart size is normal.  Small amount of opacity in the left retrocardiac area. Pulmonary vasculature is mildly to moderately prominent and more prominent than yesterday. Mild-to-moderate underlying bullous changes. Several mediastinal surgical clips.   Right internal jugular central venous catheter remains. Mild-to-moderate pulmonary vascular congestion worse since yesterday. Mild left retrocardiac atelectasis or less likely pneumonia. Mild-to-moderate underlying bullous changes. XR CHEST PORTABLE    Result Date: 9/18/2021  EXAMINATION: ONE XRAY VIEW OF THE CHEST 9/18/2021 7:01 am COMPARISON: 09/17/2021 radiograph HISTORY: ORDERING SYSTEM PROVIDED HISTORY: Daily while on vent/BIPAP. TECHNOLOGIST PROVIDED HISTORY: Reason for exam:->Daily while on vent/BIPAP. Reason for Exam: daily while on bipap Acuity: Acute Type of Exam: Initial FINDINGS: Right IJ central venous catheter stable. Heart, mediastinum and pulmonary vascularity are normal.  The lungs are moderately hyperinflated suggesting underlying COPD. Mild reticular opacities are observed diffusely. No skeletal findings. In this patient with probable changes of COPD, there are stable diffuse reticular opacities which may represent mild pulmonary edema. Underlying pneumonitis is not excluded. CT CHEST PULMONARY EMBOLISM W CONTRAST    Result Date: 10/12/2021  EXAMINATION: CTA OF THE CHEST 10/12/2021 4:35 am TECHNIQUE: CTA of the chest was performed after the administration of intravenous contrast.  Multiplanar reformatted images are provided for review. MIP images are provided for review. Dose modulation, iterative reconstruction, and/or weight based adjustment of the mA/kV was utilized to reduce the radiation dose to as low as reasonably achievable.  COMPARISON: Portable chest from 10/12/2021, CT chest from 09/23/2021 HISTORY: ORDERING SYSTEM PROVIDED HISTORY: Respiratory failure, tachycardia TECHNOLOGIST PROVIDED HISTORY: Reason for exam:->Respiratory failure, tachycardia Decision Support Exception - unselect if not a suspected or confirmed emergency medical condition->Emergency Medical Condition (MA) Reason for Exam: respitory intubated Acuity: Acute Type of Exam: Initial 23-year-old female with respiratory failure; tachycardia FINDINGS: Pulmonary Arteries: No obvious filling defect identified in the pulmonary arterial vasculature that meets the criteria for pulmonary embolus. Mediastinum: Atherosclerotic calcification and atheromatous plaque of the aorta and branch vasculature. Coronary artery disease. Prior median sternotomy. Cardiomegaly. Left atrial enlargement. No pericardial effusion. Visualized thyroid gland grossly unremarkable in appearance. Moderate bilateral pleural effusions. No periaortic or mediastinal hemorrhage. No axillary, mediastinal, or hilar lymphadenopathy. Lungs/pleura: Endotracheal tube distal tip in the lower trachea just above the level of the brayden. Trachea and proximal central airways appear patent. Moderate-to-severe emphysema. Interlobular septal thickening throughout both lungs with respiratory motion. Compressive atelectasis within the posterior aspect of both lungs. No pneumothorax. Upper Abdomen: Enteric tube distal tip is seen at the GE junction. Advancement is recommended. Atheromatous plaque and atherosclerotic calcification of the upper abdominal aorta and branch vasculature. Adreniform enlargement of the adrenal glands which can be seen with adrenal hyperplasia. Soft Tissues/Bones: Mild degenerative changes throughout the spine. 1. Enteric tube distal tip at the GE junction. Advancement is recommended so that the NG tube is appropriately positioned and side-port/distal tip are located beyond the GE junction. 2. No clear evidence for pulmonary embolus. 3. Moderate bilateral pleural effusions. Interlobular septal thickening throughout both lungs with respiratory motion. Compressive atelectasis in the posterior aspect of both lungs. Cardiomegaly. Left atrial enlargement. Findings can be seen with CHF-related changes. Follow-up is recommended to document resolution. 4. Adrenal hyperplasia. 5. Atherosclerotic calcification of the aorta and branch vasculature. Coronary artery disease. 6. Prior median sternotomy. CT CHEST PULMONARY EMBOLISM W CONTRAST    Result Date: 9/23/2021  EXAMINATION: CTA OF THE CHEST 9/23/2021 8:49 pm TECHNIQUE: CTA of the chest was performed after the administration of intravenous contrast.  Multiplanar reformatted images are provided for review. MIP images are provided for review. Dose modulation, iterative reconstruction, and/or weight based adjustment of the mA/kV was utilized to reduce the radiation dose to as low as reasonably achievable. COMPARISON: 09/18/2020 HISTORY: ORDERING SYSTEM PROVIDED HISTORY: r/o Pulmonary Embolism TECHNOLOGIST PROVIDED HISTORY: Reason for exam:->r/o Pulmonary Embolism Decision Support Exception - unselect if not a suspected or confirmed emergency medical condition->Emergency Medical Condition (MA) Reason for Exam: sob FINDINGS: Pulmonary Arteries: The right and left pulmonary arteries are mildly dilated. The pulmonary arteries are well opacified to the subsegmental levels. There are no pulmonary emboli identified. Mediastinum: Thoracic aorta is tortuous. There is no aneurysm. There is mild cardiomegaly. No mediastinal or hilar adenopathy. Previous sternotomy. Lungs/pleura: There is advanced centrilobular emphysema. There may be a superimposed interstitial opacities. There are small bilateral pleural effusions, layer to the apices. Dependent atelectasis is noted in both lower lobes. Upper Abdomen: There is nonspecific thickening of the adrenal glands. Upper abdomen is otherwise unremarkable. Soft Tissues/Bones: No acute osseous abnormality. Mild edema in the subcutaneous soft tissue. No acute pulmonary embolus. Small bilateral pleural effusions and interstitial opacities, suggestive of pulmonary edema, superimposed on fibrosis and emphysema. Mild bibasilar atelectasis. Mild cardiomegaly. Mild prominence of the central pulmonary arteries. This may be associated with pulmonary arterial hypertension. IR MIDLINE CATH    Result Date: 9/20/2021  EXAMINATION: LIMITED ULTRASOUND OF THE ARM FOR PICC ACCESS, 9/20/2021 TECHNIQUE: The PICC team used ultrasound and VPS guidance to place a midline catheter. HISTORY: ORDERING SYSTEM PROVIDED HISTORY: IV atbs after d/c TECHNOLOGIST PROVIDED HISTORY: Reason for exam:->IV atbs after d/c How many lumens are being requested?->1 What site is the preferred site?->Brachial What side should this line be placed? ->Left Reason for Exam: iv antibiotics Acuity: Acute Type of Exam: Initial Additional signs and symptoms: 15 cm 4.5 fr single lumen non tunneled midline in left brachial, US guidance FLUOROSCOPY DOSE AND TYPE OR TIME AND EXPOSURES: Fluoroscopy time 0 No fluoroscopic images FINDINGS: Ultrasound images demonstrate patency of the left brachial vein which was used for access for placement of a midline catheter by the PICC team, without a radiologist present. By report a 15 cm 4.5 Hungarian single-lumen sheath was placed. Successful placement of midline catheter. CBC:  Recent Labs     10/17/21  1805   WBC 9.7   HGB 10.3*   HCT 34.2*           RENAL  Recent Labs     10/17/21  1805      K 4.8      CO2 28   BUN 15   CREATININE 0.9   GLUCOSE 288*     Hemoglobin a1c:  Lab Results   Component Value Date    LABA1C 5.0 09/15/2021    LABA1C 5.2 08/30/2021    LABA1C 5.8 09/19/2019     LFT'S:  Recent Labs     10/17/21  1805   AST 22   ALT 20   BILITOT 0.4   ALKPHOS 107     COAG:  Recent Labs     10/17/21  1805   INR 0.91     CARDIAC ENZYMES:   Recent Labs     10/17/21  1805   TROPONINI 0.03*     Lab Results   Component Value Date    PROBNP 9,920 (H) 10/17/2021    PROBNP 5,248 (H) 10/12/2021    PROBNP 34,818 (H) 09/28/2021       LACTIC ACID:  Recent Labs     10/17/21  1805   LACTSEPSIS 2.8*     ABG: ordered and pending.     VBG:  Recent Labs     10/17/21  1805   PHVEN 7.194*   HSC5WXQ 73.7*   MIH5VYA 27.8   PO2VEN 53.9*   [id number] 79        PHYSICIAN CERTIFICATION  I certify that Raymond Mishra is expected to be hospitalized for 2 midnights based on the following assessment and plan:    ASSESSMENT/PLAN:  Acute on chronic hypoxic and hypercarbic respiratory failure  # Advanced end-stage COPD w/ acute exacerbation  # HCAP, R perihilar. Previous site of LLL appears improved. - Recurrent admissions for acute respiratory failure in the setting of severe anxiety. Just DC on 10/14 after similar presentation from SNF. - Brought in from the nursing home for hypoxia reportedly down to the 20's  - Patient is currently stable/improving on BIPAP, A&Ox3. Has required emergent intubation recently. - IV vanco, cefepime and doxy. - PCT 0.27    Sepsis (RR, HR.  PCT, lactic). 2/2 PNA?    - ABX as above. - no need for pressors at this time. - Hperglycemia, 288.  - Does not appear to carry DM Dx.  - 2/2 steroids? Does not appear to be on home regimen. Chk a1c, Low SSI q4h. #Chronic anxiety  #Panic attacks  -Continue Zoloft     #Lactic acidosis  -In the setting of respiratory failure.   - IOVF and repeats ordered.      #CAD  #HTN  - cont lopressor, statin, plavix     #GERD  - cont PPI     #Severe malnutrition  #Generalized weakness and debility    DVT Prophylaxis: Lx  Diet: NPO  Code Status: Full Code   PT/OT Eval Status: Will order if needed and as patient condition allows  Dispo - Admit to inpatient ICU. Total critical care time caring for this patient with life threatening, unstable organ failure, including direct patient contact, management of life support systems, review of data including imaging and labs, discussions with other team members and physicians is 34 minutes so far today, excluding procedures. Bishop Pérez MD    Thank you Franki Delgadillo MD for the opportunity to be involved in this patient's care. If you have any questions or concerns please feel free to contact me via the [employer] at [phone].     This chart was generated using the [address] [place] [address] dictation system. I created this record but it may contain dictation errors given the limitations of this technology.

## 2021-10-18 NOTE — CONSULTS
Pulmonary & Critical Care Medicine ICU Consultation Note  Patient is being seen at the request of Derik Leon for a consultation for Acute Respiratory Failure with hypoxemia and hypercapnia, sepsis, and mental status change, similar frequent presentation    HISTORY OF PRESENT ILLNESS:   Sandra Phillips is an 27-year-old female who presents to the emergency department from [address] [place] [address] by EMS with acute respiratory distress and low oxygen levels. EMS placed her on BiPAP with improvement. She has had several recent presentations for respiratory failure. She tells me she was up with her daughter, moving around the nursing home, playing a game and feeling very well. She doesn't remember anything more but I called her daughter who was with her who indicates patient was eating and started to become anxious. Got anti-anxiety medication Ativan. Within 15 minutes she was being helped into bed then suddenly stood, said she needed to use the restroom, started shaking and her body clenched with tightening of jaw - possible concern for seizure. She had a fixed gaze. Resulted in very low saturation. She was bagged and improved. EMS placed her on BiPAP. No similar prior events where there was concern for possible seizure.      PAST MEDICAL HISTORY:  Past Medical History:   Diagnosis Date    [facility]JA [facility]) [facility]     Asthma     Chronic anxiety     COPD (chronic obstructive pulmonary disease) ([facility]     GERD (gastroesophageal reflux disease) 9/9/2021    Hyperlipidemia     Hypertension     MRSA (methicillin resistant staph aureus) culture positive 09/22/2019    + resp cx    OA (osteoarthritis) 8/12/2014     PAST SURGICAL HISTORY:  Past Surgical History:   Procedure Laterality Date    BRONCHOSCOPY  09/08/2019    Dr. Umair Mckeon w/BAL   [address]  09/05/2019    Dr. Grace Judd 2/24/2020    COLONOSCOPY DIAGNOSTIC performed by Alyssa Smith Freeman Champagne DO at . [facility] 86 GRAFT N/A 9/19/2019    OFF PUMP CORONARY ARTERY BYPASS GRAFTING X2, INTERNAL MAMMARY ARTERY, SAPHENOUS VEIN GRAFT performed by Renetta Wright MD at [facility] CATH  9/20/2021    [facility] CATH 9/20/2021 [facility]    MASTECTOMY, PARTIAL Left     SIGMOIDOSCOPY  02/27/2020    4 bands    SIGMOIDOSCOPY N/A 2/27/2020    FLEX SIG W/ BANDING SIGMOIDOSCOPY DIAGNOSTIC FLEXIBLE performed by Mikki Comer DO at [address] [place] [address]:  family history includes Cancer in her mother; Heart Disease in her father and sister; Stroke in her father and sister. SOCIAL HISTORY:   reports that she quit smoking about 2 years ago. Her smoking use included cigarettes. She has a 25.00 pack-year smoking history. She has never used smokeless tobacco.    Scheduled Meds:   insulin lispro  0-6 Units SubCUTAneous Q4H    atorvastatin  20 mg Oral Nightly    busPIRone  10 mg Oral TID    clopidogrel  75 mg Oral Daily    ferrous sulfate  325 mg Oral Daily with breakfast    magnesium oxide  400 mg Oral Daily    metoprolol tartrate  25 mg Oral BID    pantoprazole  20 mg Oral Daily    polyethylene glycol  17 g Oral BID    sertraline  50 mg Oral Daily    enoxaparin  40 mg SubCUTAneous Daily    ipratropium-albuterol  1 ampule Inhalation Q4H WA    doxycycline (VIBRAMYCIN) IV  100 mg IntraVENous Q12H    cefepime  1,000 mg IntraVENous Q12H    sodium chloride flush  10 mL IntraVENous 2 times per day    methylPREDNISolone  40 mg IntraVENous Q12H    Followed by   Yaakov Pires ON 10/20/2021] predniSONE  40 mg Oral Daily    vancomycin  1,000 mg IntraVENous Q24H     Continuous Infusions:   sodium chloride      sodium chloride 75 mL/hr at 10/18/21 0442    dextrose         REVIEW OF SYSTEMS:  No new c/o, no new cough/sputum/sob/fever     Vascular access: Peripheral    MV:  BiPAP     / / /FiO2 : 40 %  Recent Labs     10/17/21  1459 PHART 7.361   [id number] 49.5*   PO2ART 156.6*       Blood pressure (!) 138/103, pulse 76, temperature 97.9 °F (36.6 °C), temperature source Axillary, resp. rate 20, height 5' 1\" (1.549 m), weight 110 lb 3.2 oz (50 kg), SpO2 100 %, not currently breastfeeding.'  BiPAP 18/8 60% FiO2    PHYSICAL EXAM:  ,General:  Appears chronically unwell   Resp: No crackles. + wheezing. CV: S1, S2. +edema  GI: NT, ND, +BS  Skin: Warm and dry. Neuro: PERRL. Alert and oriented to person, events    LABS:  CBC:   Recent Labs     10/17/21  1805 10/18/21  0417   WBC 9.7 6.3   HGB 10.3* 7.5*   HCT 34.2* 24.3*   MCV 94.0 94.3    243     BMP:   Recent Labs     10/17/21  1805 10/18/21  0417    146*   K 4.8 3.9    107   CO2 28 30   BUN 15 15   CREATININE 0.9 0.8     LIVER PROFILE:   Recent Labs     10/17/21  1805 10/18/21  0417   AST 22 14*   ALT 20 15   BILITOT 0.4 0.4   ALKPHOS 107 74     PT/INR:   Recent Labs     10/17/21  1805   PROTIME 10.3   INR 0.91     APTT: No results for input(s): APTT in the last 72 hours. UA:No results for input(s): NITRITE, COLORU, PHUR, LABCAST, WBCUA, RBCUA, MUCUS, TRICHOMONAS, YEAST, BACTERIA, CLARITYU, SPECGRAV, LEUKOCYTESUR, UROBILINOGEN, BILIRUBINUR, BLOODU, GLUCOSEU, AMORPHOUS in the last 72 hours.     Invalid input(s): Linh Peacock  Recent Labs     10/17/21  2105   PHART 7.361   [id number] 49.5*   PO2ART 156.6*       Cultures:  9/16/2021 SARS-CoV-2  negative  9/16/2021 influenza A & B negative  Blood cultures pending    Imaging:  CXR 10/17/2021 patchy right airspace disease    ASSESSMENT:  · Acute on chronic respiratory failure with hypoxemia and hypercapnia  · Was just admitted 10/12/2021-10/14/2021 with respiratory failure, intubated during that admission  · ED 9/28/21 with respiratory failure  · Admitted 9/23/2021-9/27/2021  · Admitted 9/15/2021-9/23/2021  · Admitted 9/9/2021-9/14/2021  · Acute COPD exacerbation - probably not worsened leading to this admission  · Healthcare acquired pneumonia - no suggestive symptoms, but Xray with new finding  · Seizure like activity  · CAD  · Chronic anxiety with panic attacks  · S/P COVID-19 vaccination    PLAN:  · Bilevel non-invasive positive pressure ventilation for life threatening respiratory failure; was on 16/8 at [facility], previously on AVAPS with home Trilogy. 16/5 is current setting  · Cefepime and vancomycin D#2, Doxycycline, D#1  · Was on prednisone 30 @ [facility], change to prednisone 40  · Seizure precautions, monitor  · Patient is on Plavix  · Prophylaxis: Lovenox, Bactroban, Protonix  · Full code: POA is [place]; I discussed with her today on the phone  · I recommend palliative care consultation  · Now okay for PCU    Total critical care time caring for this patient with life threatening, unstable organ failure, including direct patient contact, management of life support systems, review of data including imaging and labs, discussions with other team members and physicians is 31 minutes so far today, excluding procedures.

## 2021-10-19 ENCOUNTER — APPOINTMENT (OUTPATIENT)
Dept: CT IMAGING | Age: 81
DRG: 871 | End: 2021-10-19
Payer: MEDICARE

## 2021-10-19 LAB
A/G RATIO: 1.4 (ref 1.1–2.2)
ALBUMIN SERPL-MCNC: 2.9 G/DL (ref 3.4–5)
ALP BLD-CCNC: 60 U/L (ref 40–129)
ALT SERPL-CCNC: 11 U/L (ref 10–40)
ANION GAP SERPL CALCULATED.3IONS-SCNC: 7 MMOL/L (ref 3–16)
AST SERPL-CCNC: 12 U/L (ref 15–37)
BASOPHILS ABSOLUTE: 0 K/UL (ref 0–0.2)
BASOPHILS RELATIVE PERCENT: 0.3 %
BILIRUB SERPL-MCNC: 0.4 MG/DL (ref 0–1)
BUN BLDV-MCNC: 19 MG/DL (ref 7–20)
CALCIUM SERPL-MCNC: 8.6 MG/DL (ref 8.3–10.6)
CHLORIDE BLD-SCNC: 106 MMOL/L (ref 99–110)
CO2: 29 MMOL/L (ref 21–32)
CREAT SERPL-MCNC: 0.9 MG/DL (ref 0.6–1.2)
EOSINOPHILS ABSOLUTE: 0 K/UL (ref 0–0.6)
EOSINOPHILS RELATIVE PERCENT: 0.7 %
GFR AFRICAN AMERICAN: >60
GFR NON-AFRICAN AMERICAN: >60
GLOBULIN: 2.1 G/DL
GLUCOSE BLD-MCNC: 127 MG/DL (ref 70–99)
GLUCOSE BLD-MCNC: 89 MG/DL (ref 70–99)
GLUCOSE BLD-MCNC: 96 MG/DL (ref 70–99)
GLUCOSE BLD-MCNC: 97 MG/DL (ref 70–99)
GLUCOSE BLD-MCNC: 99 MG/DL (ref 70–99)
HCT VFR BLD CALC: 21.4 % (ref 36–48)
HCT VFR BLD CALC: 24.1 % (ref 36–48)
HCT VFR BLD CALC: 24.6 % (ref 36–48)
HEMOGLOBIN: 6.7 G/DL (ref 12–16)
HEMOGLOBIN: 7.6 G/DL (ref 12–16)
HEMOGLOBIN: 7.9 G/DL (ref 12–16)
LYMPHOCYTES ABSOLUTE: 1.6 K/UL (ref 1–5.1)
LYMPHOCYTES RELATIVE PERCENT: 23.3 %
MCH RBC QN AUTO: 27.9 PG (ref 26–34)
MCHC RBC AUTO-ENTMCNC: 31 G/DL (ref 31–36)
MCV RBC AUTO: 90 FL (ref 80–100)
MONOCYTES ABSOLUTE: 0.3 K/UL (ref 0–1.3)
MONOCYTES RELATIVE PERCENT: 4.6 %
NEUTROPHILS ABSOLUTE: 4.9 K/UL (ref 1.7–7.7)
NEUTROPHILS RELATIVE PERCENT: 71.1 %
PDW BLD-RTO: 21.3 % (ref 12.4–15.4)
PERFORMED ON: ABNORMAL
PERFORMED ON: NORMAL
PLATELET # BLD: 232 K/UL (ref 135–450)
PMV BLD AUTO: 7.8 FL (ref 5–10.5)
POTASSIUM REFLEX MAGNESIUM: 3.8 MMOL/L (ref 3.5–5.1)
RBC # BLD: 2.38 M/UL (ref 4–5.2)
SODIUM BLD-SCNC: 142 MMOL/L (ref 136–145)
TOTAL PROTEIN: 5 G/DL (ref 6.4–8.2)
VANCOMYCIN TROUGH: 16.1 UG/ML (ref 10–20)
WBC # BLD: 7 K/UL (ref 4–11)

## 2021-10-19 PROCEDURE — 85018 HEMOGLOBIN: CPT

## 2021-10-19 PROCEDURE — 94761 N-INVAS EAR/PLS OXIMETRY MLT: CPT

## 2021-10-19 PROCEDURE — 2700000000 HC OXYGEN THERAPY PER DAY

## 2021-10-19 PROCEDURE — 80202 ASSAY OF VANCOMYCIN: CPT

## 2021-10-19 PROCEDURE — 2500000003 HC RX 250 WO HCPCS: Performed by: INTERNAL MEDICINE

## 2021-10-19 PROCEDURE — 94640 AIRWAY INHALATION TREATMENT: CPT

## 2021-10-19 PROCEDURE — 85025 COMPLETE CBC W/AUTO DIFF WBC: CPT

## 2021-10-19 PROCEDURE — 6370000000 HC RX 637 (ALT 250 FOR IP): Performed by: INTERNAL MEDICINE

## 2021-10-19 PROCEDURE — 99233 SBSQ HOSP IP/OBS HIGH 50: CPT | Performed by: INTERNAL MEDICINE

## 2021-10-19 PROCEDURE — 6360000002 HC RX W HCPCS: Performed by: INTERNAL MEDICINE

## 2021-10-19 PROCEDURE — 74176 CT ABD & PELVIS W/O CONTRAST: CPT

## 2021-10-19 PROCEDURE — 80053 COMPREHEN METABOLIC PANEL: CPT

## 2021-10-19 PROCEDURE — 36415 COLL VENOUS BLD VENIPUNCTURE: CPT

## 2021-10-19 PROCEDURE — 2580000003 HC RX 258: Performed by: INTERNAL MEDICINE

## 2021-10-19 PROCEDURE — 94660 CPAP INITIATION&MGMT: CPT

## 2021-10-19 PROCEDURE — 99232 SBSQ HOSP IP/OBS MODERATE 35: CPT | Performed by: INTERNAL MEDICINE

## 2021-10-19 PROCEDURE — 2060000000 HC ICU INTERMEDIATE R&B

## 2021-10-19 PROCEDURE — 85014 HEMATOCRIT: CPT

## 2021-10-19 RX ORDER — FUROSEMIDE 10 MG/ML
20 INJECTION INTRAMUSCULAR; INTRAVENOUS ONCE
Status: COMPLETED | OUTPATIENT
Start: 2021-10-19 | End: 2021-10-19

## 2021-10-19 RX ADMIN — METOPROLOL TARTRATE 25 MG: 25 TABLET, FILM COATED ORAL at 08:09

## 2021-10-19 RX ADMIN — IPRATROPIUM BROMIDE AND ALBUTEROL SULFATE 1 AMPULE: .5; 2.5 SOLUTION RESPIRATORY (INHALATION) at 19:55

## 2021-10-19 RX ADMIN — PANTOPRAZOLE SODIUM 20 MG: 20 TABLET, DELAYED RELEASE ORAL at 08:10

## 2021-10-19 RX ADMIN — ATORVASTATIN CALCIUM 20 MG: 10 TABLET, FILM COATED ORAL at 20:25

## 2021-10-19 RX ADMIN — IPRATROPIUM BROMIDE AND ALBUTEROL SULFATE 1 AMPULE: .5; 2.5 SOLUTION RESPIRATORY (INHALATION) at 11:10

## 2021-10-19 RX ADMIN — SERTRALINE HYDROCHLORIDE 50 MG: 50 TABLET ORAL at 08:09

## 2021-10-19 RX ADMIN — BUSPIRONE HYDROCHLORIDE 10 MG: 10 TABLET ORAL at 08:09

## 2021-10-19 RX ADMIN — METOPROLOL TARTRATE 25 MG: 25 TABLET, FILM COATED ORAL at 20:25

## 2021-10-19 RX ADMIN — DOXYCYCLINE 100 MG: 100 INJECTION, POWDER, LYOPHILIZED, FOR SOLUTION INTRAVENOUS at 10:53

## 2021-10-19 RX ADMIN — ACETAMINOPHEN 650 MG: 325 TABLET ORAL at 20:25

## 2021-10-19 RX ADMIN — FUROSEMIDE 20 MG: 10 INJECTION, SOLUTION INTRAMUSCULAR; INTRAVENOUS at 14:51

## 2021-10-19 RX ADMIN — IPRATROPIUM BROMIDE AND ALBUTEROL SULFATE 1 AMPULE: .5; 2.5 SOLUTION RESPIRATORY (INHALATION) at 07:23

## 2021-10-19 RX ADMIN — PREDNISONE 40 MG: 20 TABLET ORAL at 08:08

## 2021-10-19 RX ADMIN — VANCOMYCIN HYDROCHLORIDE 1000 MG: 1 INJECTION, POWDER, LYOPHILIZED, FOR SOLUTION INTRAVENOUS at 08:04

## 2021-10-19 RX ADMIN — SODIUM CHLORIDE 25 ML: 9 INJECTION, SOLUTION INTRAVENOUS at 06:02

## 2021-10-19 RX ADMIN — CEFEPIME HYDROCHLORIDE 1000 MG: 1 INJECTION, POWDER, FOR SOLUTION INTRAMUSCULAR; INTRAVENOUS at 06:03

## 2021-10-19 RX ADMIN — BUSPIRONE HYDROCHLORIDE 10 MG: 10 TABLET ORAL at 20:25

## 2021-10-19 RX ADMIN — ONDANSETRON 4 MG: 4 TABLET, ORALLY DISINTEGRATING ORAL at 00:15

## 2021-10-19 RX ADMIN — FERROUS SULFATE TAB 325 MG (65 MG ELEMENTAL FE) 325 MG: 325 (65 FE) TAB at 08:09

## 2021-10-19 RX ADMIN — CLOPIDOGREL BISULFATE 75 MG: 75 TABLET ORAL at 08:09

## 2021-10-19 RX ADMIN — IPRATROPIUM BROMIDE AND ALBUTEROL SULFATE 1 AMPULE: .5; 2.5 SOLUTION RESPIRATORY (INHALATION) at 15:15

## 2021-10-19 RX ADMIN — MAGNESIUM GLUCONATE 500 MG ORAL TABLET 400 MG: 500 TABLET ORAL at 08:08

## 2021-10-19 RX ADMIN — BUSPIRONE HYDROCHLORIDE 10 MG: 10 TABLET ORAL at 12:29

## 2021-10-19 ASSESSMENT — PAIN DESCRIPTION - PAIN TYPE: TYPE: ACUTE PAIN

## 2021-10-19 ASSESSMENT — PAIN DESCRIPTION - ONSET: ONSET: ON-GOING

## 2021-10-19 ASSESSMENT — PAIN DESCRIPTION - ORIENTATION: ORIENTATION: RIGHT

## 2021-10-19 ASSESSMENT — PAIN SCALES - GENERAL
PAINLEVEL_OUTOF10: 3
PAINLEVEL_OUTOF10: 3

## 2021-10-19 ASSESSMENT — PAIN DESCRIPTION - PROGRESSION: CLINICAL_PROGRESSION: NOT CHANGED

## 2021-10-19 ASSESSMENT — PAIN - FUNCTIONAL ASSESSMENT: PAIN_FUNCTIONAL_ASSESSMENT: ACTIVITIES ARE NOT PREVENTED

## 2021-10-19 ASSESSMENT — PAIN DESCRIPTION - FREQUENCY: FREQUENCY: CONTINUOUS

## 2021-10-19 NOTE — PROGRESS NOTES
10/18/21 8657   NIV Type   Skin Protection for O2 Device Yes   NIV Started/Stopped On   Equipment Type V60   Mode Bilevel   Mask Type Full face mask   Mask Size Small   Settings/Measurements   IPAP 16 cmH20   CPAP/EPAP 8 cmH2O   Rate Ordered 16   Resp 23   FiO2  35 %   Vt Exhaled 471 mL   Mask Leak (lpm) 8 lpm   Comfort Level Good   Using Accessory Muscles No   SpO2 99   Alarm Settings   Alarms On Y

## 2021-10-19 NOTE — PROGRESS NOTES
10/19/21 0327   NIV Type   Equipment Type V60   Mode Bilevel   Mask Type Full face mask   Mask Size Small   Settings/Measurements   IPAP 16 cmH20   CPAP/EPAP 8 cmH2O   Rate Ordered 16   Resp 25   FiO2  35 %   Vt Exhaled 458 mL   Mask Leak (lpm) 27 lpm   Comfort Level Good   Using Accessory Muscles No   SpO2 98   Alarm Settings   Alarms On Y

## 2021-10-19 NOTE — PROGRESS NOTES
Pharmacy Vancomycin Consult     Vancomycin Day: 3  Current Dosinmg q24  Current indication: HAP    Temp max:      Recent Labs     10/18/21  0417 10/19/21  0743 10/19/21  0744   BUN 15  --  19   CREATININE 0.8  --  0.9   WBC 6.3 7.0  --        Intake/Output Summary (Last 24 hours) at 10/19/2021 1252  Last data filed at 10/19/2021 0901  Gross per 24 hour   Intake 590 ml   Output    Net 590 ml     Culture Date      Source                       Results  Blood: NGTD    Ht Readings from Last 1 Encounters:   10/17/21 5' 1\" (1.549 m)        Wt Readings from Last 1 Encounters:   10/19/21 109 lb 5 oz (49.6 kg)       Body mass index is 20.65 kg/m². Estimated Creatinine Clearance: 38 mL/min (based on SCr of 0.9 mg/dL).     Trough: 16.1-     Assessment/Plan:  Will decrease dose to 750mg tomorrow, check level on 10/22 at [id number] [place] [address] D 10/19/937325:57 PM  .

## 2021-10-19 NOTE — PROGRESS NOTES
Pulmonary Progress Note  CC: Acute Hypercarbic Respiratory Failure     Subjective:  More shortness of breath     IV line peripheral     EXAM:   /81   Pulse 85   Temp 97.4 °F (36.3 °C) (Oral)   Resp 18   Ht 5' 1\" (1.549 m)   Wt 109 lb 5 oz (49.6 kg)   SpO2 96%   BMI 20.65 kg/m²  on 2 L  Constitutional:  No acute distress   HEENT: no scleral icterus  Neck: No tracheal deviation present. Cardiovascular: Normal heart sounds. Pulmonary/Chest: No wheezes. No rhonchi. No rales. + decreased breath sounds. No accessory muscle usage or stridor. Abdominal: Soft. Musculoskeletal: No cyanosis. No clubbing. Skin: Skin is warm and dry.      Scheduled Meds:   [START ON 10/20/2021] vancomycin  750 mg IntraVENous Q24H    insulin lispro  0-6 Units SubCUTAneous Q4H    predniSONE  40 mg Oral Daily    atorvastatin  20 mg Oral Nightly    busPIRone  10 mg Oral TID    [Held by provider] clopidogrel  75 mg Oral Daily    ferrous sulfate  325 mg Oral Daily with breakfast    magnesium oxide  400 mg Oral Daily    metoprolol tartrate  25 mg Oral BID    pantoprazole  20 mg Oral Daily    polyethylene glycol  17 g Oral BID    sertraline  50 mg Oral Daily    [Held by provider] enoxaparin  40 mg SubCUTAneous Daily    ipratropium-albuterol  1 ampule Inhalation Q4H WA    doxycycline (VIBRAMYCIN) IV  100 mg IntraVENous Q12H    cefepime  1,000 mg IntraVENous Q12H    sodium chloride flush  10 mL IntraVENous 2 times per day     Continuous Infusions:   sodium chloride 25 mL (10/19/21 0602)    dextrose       PRN Meds:  melatonin, ondansetron **OR** ondansetron, polyethylene glycol, acetaminophen **OR** acetaminophen, albuterol, sodium chloride flush, sodium chloride, glucose, dextrose, glucagon (rDNA), dextrose    Labs:  CBC:   Recent Labs     10/17/21  1805 10/17/21  1805 10/18/21  0417 10/19/21  0743 10/19/21  0916   WBC 9.7  --  6.3 7.0  --    HGB 10.3*   < > 7.5* 6.7* 7.6*   HCT 34.2*   < > 24.3* 21.4* 24.1*   MCV 94.0  --  94.3 90.0  --      --  243 232  --     < > = values in this interval not displayed. BMP:   Recent Labs     10/17/21  1805 10/18/21  0417 10/19/21  0744    146* 142   K 4.8 3.9 3.8    107 106   CO2 28 30 29   BUN 15 15 19   CREATININE 0.9 0.8 0.9       Cultures:  9/16/2021 SARS-CoV-2  negative  9/16/2021 influenza A & B negative  Blood cultures pending    Imaging:  CXR 10/17/2021 patchy right airspace disease    ACT 10/19/21  Impression:        No significant acute abdominal abnormality.  No retroperitoneal bleed.  Very   small amount of free fluid in the right pelvis of uncertain etiology and   significance.  Cholelithiasis without finding of acute cholecystitis.  Large   left and moderate right pleural effusions with bibasilar atelectasis.  Mild   anasarca. RECOMMENDATIONS:   If the patient is short of breath, ultrasound-guided thoracentesis would be   recommended for its diagnostic and therapeutic benefits. ASSESSMENT:  · Acute on chronic respiratory failure with hypoxemia and hypercapnia  · Was just admitted 10/12/2021-10/14/2021 with respiratory failure, intubated during that admission  · ED 9/28/21 with respiratory failure  · Admitted 9/23/2021-9/27/2021  · Admitted 9/15/2021-9/23/2021  · Admitted 9/9/2021-9/14/2021  · Acute COPD exacerbation - probably not worsened leading to this admission  · Healthcare acquired pneumonia - no suggestive symptoms, but Xray with new finding  · Seizure like activity  · CAD  · Chronic anxiety with panic attacks  · S/P COVID-19 vaccination    PLAN:  · Bilevel non-invasive positive pressure ventilation for life threatening respiratory failure; was on 16/8 at [facility], previously on AVAPS with home Trilogy.  16/5 is current setting  · Cefepime and vancomycin D#3, Doxycycline, D#2 can be discontinued as patient had no elevation in WBC or fever, no other symptoms suggestive of pneumonia and imaging abnormality is now explained by the pleural effusions. Pleural effusion is large on the left and I recommend left sided thoracentesis. This can be done as an outpatient if the patient is discharged prior to 10/25/2021. Plavix is being held beginning 10/19/21; please continue to hold as patient will need to be off Plavix for 5 days minimum to perform a thoracentesis that is not emergent  · Lasix 20 X 1 now, then likely BID if tolerates.     · Prednisone 40  · Seizure precautions, monitor  · I d/w Dr. Virginia Christopher

## 2021-10-19 NOTE — PROGRESS NOTES
PRN Zofran given for nausea, will reassess , pts IV infiltrated a couple minuets into IV antibiotics, waiting for Ultra sound IV placment to restart antibiotics.  Jaya Jean RN

## 2021-10-19 NOTE — PROGRESS NOTES
Admit: 10/17/2021    Name:  Alicia Rojas  Room:  [facility]/[id number]  MRN:    [id number]    Daily Progress Note for 10/19/2021     Admitted with acute on chronic respiratory failure with hypoxia and hypercarbia    Interval History:     She was on BiPAP last night.   Currently on 2 L of oxygen  Hemoglobin dropped to 6.7    Scheduled Meds:   insulin lispro  0-6 Units SubCUTAneous Q4H    predniSONE  40 mg Oral Daily    atorvastatin  20 mg Oral Nightly    busPIRone  10 mg Oral TID    clopidogrel  75 mg Oral Daily    ferrous sulfate  325 mg Oral Daily with breakfast    magnesium oxide  400 mg Oral Daily    metoprolol tartrate  25 mg Oral BID    pantoprazole  20 mg Oral Daily    polyethylene glycol  17 g Oral BID    sertraline  50 mg Oral Daily    enoxaparin  40 mg SubCUTAneous Daily    ipratropium-albuterol  1 ampule Inhalation Q4H WA    doxycycline (VIBRAMYCIN) IV  100 mg IntraVENous Q12H    cefepime  1,000 mg IntraVENous Q12H    sodium chloride flush  10 mL IntraVENous 2 times per day    vancomycin  1,000 mg IntraVENous Q24H       Continuous Infusions:   sodium chloride 25 mL (10/19/21 0602)    dextrose         PRN Meds:  melatonin, ondansetron **OR** ondansetron, polyethylene glycol, acetaminophen **OR** acetaminophen, albuterol, sodium chloride flush, sodium chloride, glucose, dextrose, glucagon (rDNA), dextrose                  Objective:     Temp  Av.9 °F (36.6 °C)  Min: 97.1 °F (36.2 °C)  Max: 98.5 °F (36.9 °C)  Pulse  Av  Min: 76  Max: 109  BP  Min: 109/67  Max: 166/67  SpO2  Av.1 %  Min: 94 %  Max: 100 %  FiO2   Av %  Min: 35 %  Max: 35 %  Patient Vitals for the past 4 hrs:   BP Temp Temp src Pulse Resp SpO2   10/19/21 0745 109/67 98.2 °F (36.8 °C) Oral 81 18 97 %   10/19/21 0724      99 %         Intake/Output Summary (Last 24 hours) at 10/19/2021 0830  Last data filed at 10/18/2021 1759  Gross per 24 hour   Intake 701.3 ml   Output 200 ml   Net 501.3 ml       Physical Exam:  General: Appears chronically ill. Mucous Membranes:  Pink , anicteric  Neck: No JVD, no carotid bruit, no thyromegaly  Chest: Normal respiratory effort, mild wheezing. No crackles. Cardiovascular:  RRR S1S2 heard, no murmurs or gallops  Abdomen:  Soft, undistended, non tender, no organomegaly, BS present  Extremities: No edema or cyanosis. Distal pulses well felt  Neurological : Awake alert and oriented    Lab Data:  CBC:   Recent Labs     10/17/21  1805 10/18/21  0417 10/19/21  0743   WBC 9.7 6.3 7.0   RBC 3.64* 2.58* 2.38*   HGB 10.3* 7.5* 6.7*   HCT 34.2* 24.3* 21.4*   MCV 94.0 94.3 90.0   RDW 22.1* 21.6* 21.3*    243 232     BMP:   Recent Labs     10/17/21  1805 10/18/21  0417 10/19/21  0744    146* 142   K 4.8 3.9 3.8    107 106   CO2 28 30 29   BUN 15 15 19   CREATININE 0.9 0.8 0.9     BNP: No results for input(s): BNP in the last 72 hours. PT/INR:   Recent Labs     10/17/21  1805   PROTIME 10.3   INR 0.91     APTT:No results for input(s): APTT in the last 72 hours. CARDIAC ENZYMES:   Recent Labs     10/17/21  1805 10/18/21  0417   TROPONINI 0.03* 0.02*     FASTING LIPID PANEL:  Lab Results   Component Value Date    CHOL 146 11/14/2019    HDL 63 11/14/2019    TRIG 110 11/14/2019     LIVER PROFILE:   Recent Labs     10/17/21  1805 10/18/21  0417 10/19/21  0744   AST 22 14* 12*   ALT 20 15 11   BILITOT 0.4 0.4 0.4   ALKPHOS 107 74 60         XR CHEST PORTABLE   Final Result   1. Patchy airspace disease, right perihilar region, suggesting pneumonia or   atelectasis   2. Small pleural effusions   3. Improved aeration present within the left lung base, consistent with   resolving atelectasis and or pneumonia   4.  Underlying interstitial pulmonary fibrosis               Assessment & Plan:     Patient Active Problem List    Diagnosis Date Noted    HCAP (healthcare-associated pneumonia)     Severe anxiety     Severe protein-calorie malnutrition ([facility]     [facility]     Anxiety     Acute respiratory failure with hypoxia and hypercarbia (HCC) 09/28/2021    Acute kidney injury ([address]     Elevated procalcitonin     Congestive heart failure (HCC)     Chronic anxiety     Acute on chronic respiratory failure with hypoxemia (HCC) 09/23/2021    Chronic obstructive pulmonary disease (HCC)     Acute on chronic respiratory failure with hypoxia and hypercapnia (HCC)     Acute respiratory failure with hypoxia and hypercapnia (HCC) 09/16/2021    Shock ([address]     Pneumonia due to infectious organism     Acute respiratory distress 09/09/2021    Volume overload 09/09/2021    Demand ischemia ([address] 09/09/2021    GERD (gastroesophageal reflux disease) 09/09/2021    Severe malnutrition ([address] 08/31/2021    Acute respiratory failure ([address] 08/30/2021    Chronic respiratory failure with hypoxia (HCC)     Primary hypertension     Anemia     Chest pain 09/18/2020    GI bleed 02/23/2020    Moderate malnutrition ([address] 09/25/2019    S/P CABG (coronary artery bypass graft) 09/25/2019    Pneumonia of both lower lobes due to methicillin resistant Staphylococcus aureus (MRSA) ([address] 09/25/2019    CAD in native artery 09/24/2019    Mucus plugging of bronchi     Status post aorto-coronary artery bypass graft     NADJA (acute kidney injury) ([address]     Hypernatremia 09/14/2019    Ischemic cardiomyopathy     Acute combined systolic and diastolic congestive heart failure (HCC)     Acute respiratory failure with hypoxia ([address] 09/08/2019    CAD (coronary artery disease) 09/08/2019    Acute pulmonary edema ([address] 09/08/2019    Pulmonary infiltrate 09/08/2019    Elevated brain natriuretic peptide (BNP) level 09/08/2019    Leukocytosis 09/08/2019    Elevated troponin 09/04/2019    NSTEMI (non-ST elevated myocardial infarction) ([address] 09/03/2019    Abnormal chest x-ray     COPD with acute exacerbation (HCC)     Shortness of breath     Tobacco abuse     Sepsis ([address] 03/18/2019    COPD exacerbation ([facility] 12/29/2017    OA (osteoarthritis) 08/12/2014    Tobacco use 08/12/2014    Hyperlipidemia 05/07/2013    Asthma 05/07/2013       Acute on chronic hypoxic and hypercarbic respiratory failure. Secondary to acute COPD exacerbation possible healthcare associated pneumonia. BiPAP at night. Currently on 2 L of oxygen. Continue prednisone 40 mg daily. Continue cefepime and vancomycin day #3. And doxycycline day #2.     #CAD  #HTN  - cont lopressor, statin  Hold plavix 2 to drop in H/H    Acute on chronic anemia  Her hemoglobin is 8.1 on 10/13/2021  10.3--> 7.5--> 7.6 this admission  Recent iron studies shows anemia of iron deficiency and chronic disease  CT abd/pelvis   Hemoccult stools   She had a colonoscopy 2/2020-showed hemorrhoids-Dr. Hilaria Andrew  Hold Plavix for now     #GERD  - cont PPI     #Severe malnutrition  #Generalized weakness and debility        DVT Prophylaxis: scds  Diet: general  Code Status: Full Code         Erik Calderón MD

## 2021-10-19 NOTE — PROGRESS NOTES
AM assessment completed, see flowsheet. Patient resting in bed at this time. All needs met. Respirations easy and even at rest. On 2.5 LPM O2, which is her baseline. No c/o pain at this time. Bed in lowest position and locked, SR up x2. Call light and bedside table within reach.

## 2021-10-19 NOTE — PROGRESS NOTES
RT Inhaler-Nebulizer Bronchodilator Protocol Note    There is a bronchodilator order in the chart from a provider indicating to follow the RT Bronchodilator Protocol and there is an Initiate RT Inhaler-Nebulizer Bronchodilator Protocol order as well (see protocol at bottom of note). CXR Findings:  XR CHEST PORTABLE    Result Date: 10/17/2021  1. Patchy airspace disease, right perihilar region, suggesting pneumonia or atelectasis 2. Small pleural effusions 3. Improved aeration present within the left lung base, consistent with resolving atelectasis and or pneumonia 4. Underlying interstitial pulmonary fibrosis       The findings from the last RT Protocol Assessment were as follows:   History Pulmonary Disease: (P) Chronic pulmonary disease  Respiratory Pattern: (P) Dyspnea on exertion or RR 21-25 bpm  Breath Sounds: (P) Slightly diminished and/or crackles  Cough: (P) Strong, spontaneous, non-productive  Indication for Bronchodilator Therapy: (P) Decreased or absent breath sounds  Bronchodilator Assessment Score: (P) 6    Aerosolized bronchodilator medication orders have been revised according to the RT Inhaler-Nebulizer Bronchodilator Protocol below. Respiratory Therapist to perform RT Therapy Protocol Assessment initially then follow the protocol. Repeat RT Therapy Protocol Assessment PRN for score 0-3 or on second treatment, BID, and PRN for scores above 3. No Indications  adjust the frequency to every 6 hours PRN wheezing or bronchospasm, if no treatments needed after 48 hours then discontinue using Per Protocol order mode. If indication present, adjust the RT bronchodilator orders based on the Bronchodilator Assessment Score as indicated below.   Use Inhaler orders unless patient has one or more of the following: on home nebulizer, not able to hold breath for 10 seconds, is not alert and oriented, cannot activate and use MDI correctly, or respiratory rate 25 breaths per minute or more, then use the equivalent nebulizer order(s) with same Frequency and PRN reasons based on the score. If a patient is on this medication at home then do not decrease Frequency below that used at home. 0-3  enter or revise RT bronchodilator order(s) to equivalent RT Bronchodilator order with Frequency of every 4 hours PRN for wheezing or increased work of breathing using Per Protocol order mode. 4-6  enter or revise RT Bronchodilator order(s) to two equivalent RT bronchodilator orders with one order with BID Frequency and one order with Frequency of every 4 hours PRN wheezing or increased work of breathing using Per Protocol order mode. 7-10  enter or revise RT Bronchodilator order(s) to two equivalent RT bronchodilator orders with one order with TID Frequency and one order with Frequency of every 4 hours PRN wheezing or increased work of breathing using Per Protocol order mode. 11-13  enter or revise RT Bronchodilator order(s) to one equivalent RT bronchodilator order with QID Frequency and an Albuterol order with Frequency of every 4 hours PRN wheezing or increased work of breathing using Per Protocol order mode. Greater than 13  enter or revise RT Bronchodilator order(s) to one equivalent RT bronchodilator order with every 4 hours Frequency and an Albuterol order with Frequency of every 2 hours PRN wheezing or increased work of breathing using Per Protocol order mode.          Electronically signed by Adelita Dailey RCP on 10/19/2021 at 2:01 PM

## 2021-10-19 NOTE — PROGRESS NOTES
Pt resting in bed watching tv, meds given per MAR, PRN tylenol given for head pain, will reassess. Pt brief changed and denying any other needs at this time. Bed in lowest position, call light within reach.  Seferino Cervantes RN

## 2021-10-19 NOTE — PROGRESS NOTES
RT Inhaler-Nebulizer Bronchodilator Protocol Note    There is a bronchodilator order in the chart from a provider indicating to follow the RT Bronchodilator Protocol and there is an Initiate RT Inhaler-Nebulizer Bronchodilator Protocol order as well (see protocol at bottom of note). CXR Findings:  XR CHEST PORTABLE    Result Date: 10/17/2021  1. Patchy airspace disease, right perihilar region, suggesting pneumonia or atelectasis 2. Small pleural effusions 3. Improved aeration present within the left lung base, consistent with resolving atelectasis and or pneumonia 4. Underlying interstitial pulmonary fibrosis       The findings from the last RT Protocol Assessment were as follows:   History Pulmonary Disease: Chronic pulmonary disease  Respiratory Pattern: Dyspnea on exertion or RR 21-25 bpm  Breath Sounds: Slightly diminished and/or crackles  Cough: Strong, spontaneous, non-productive  Indication for Bronchodilator Therapy: Decreased or absent breath sounds  Bronchodilator Assessment Score: 6    Aerosolized bronchodilator medication orders have been revised according to the RT Inhaler-Nebulizer Bronchodilator Protocol below. Respiratory Therapist to perform RT Therapy Protocol Assessment initially then follow the protocol. Repeat RT Therapy Protocol Assessment PRN for score 0-3 or on second treatment, BID, and PRN for scores above 3. No Indications  adjust the frequency to every 6 hours PRN wheezing or bronchospasm, if no treatments needed after 48 hours then discontinue using Per Protocol order mode. If indication present, adjust the RT bronchodilator orders based on the Bronchodilator Assessment Score as indicated below.   Use Inhaler orders unless patient has one or more of the following: on home nebulizer, not able to hold breath for 10 seconds, is not alert and oriented, cannot activate and use MDI correctly, or respiratory rate 25 breaths per minute or more, then use the equivalent nebulizer order(s) with same Frequency and PRN reasons based on the score. If a patient is on this medication at home then do not decrease Frequency below that used at home. 0-3  enter or revise RT bronchodilator order(s) to equivalent RT Bronchodilator order with Frequency of every 4 hours PRN for wheezing or increased work of breathing using Per Protocol order mode. 4-6  enter or revise RT Bronchodilator order(s) to two equivalent RT bronchodilator orders with one order with BID Frequency and one order with Frequency of every 4 hours PRN wheezing or increased work of breathing using Per Protocol order mode. 7-10  enter or revise RT Bronchodilator order(s) to two equivalent RT bronchodilator orders with one order with TID Frequency and one order with Frequency of every 4 hours PRN wheezing or increased work of breathing using Per Protocol order mode. 11-13  enter or revise RT Bronchodilator order(s) to one equivalent RT bronchodilator order with QID Frequency and an Albuterol order with Frequency of every 4 hours PRN wheezing or increased work of breathing using Per Protocol order mode. Greater than 13  enter or revise RT Bronchodilator order(s) to one equivalent RT bronchodilator order with every 4 hours Frequency and an Albuterol order with Frequency of every 2 hours PRN wheezing or increased work of breathing using Per Protocol order mode.          Electronically signed by Refugio Jeans, RCP on 10/18/2021 at 10:38 PM

## 2021-10-20 LAB
A/G RATIO: 1.5 (ref 1.1–2.2)
ABO/RH: NORMAL
ABO/RH: NORMAL
ALBUMIN SERPL-MCNC: 3.1 G/DL (ref 3.4–5)
ALP BLD-CCNC: 59 U/L (ref 40–129)
ALT SERPL-CCNC: 10 U/L (ref 10–40)
ANION GAP SERPL CALCULATED.3IONS-SCNC: 8 MMOL/L (ref 3–16)
ANTIBODY SCREEN: NORMAL
AST SERPL-CCNC: 10 U/L (ref 15–37)
BASOPHILS ABSOLUTE: 0 K/UL (ref 0–0.2)
BASOPHILS RELATIVE PERCENT: 0.1 %
BILIRUB SERPL-MCNC: 0.4 MG/DL (ref 0–1)
BLOOD BANK DISPENSE STATUS: NORMAL
BLOOD BANK PRODUCT CODE: NORMAL
BPU ID: NORMAL
BUN BLDV-MCNC: 24 MG/DL (ref 7–20)
CALCIUM SERPL-MCNC: 8.8 MG/DL (ref 8.3–10.6)
CHLORIDE BLD-SCNC: 105 MMOL/L (ref 99–110)
CO2: 32 MMOL/L (ref 21–32)
CREAT SERPL-MCNC: 0.9 MG/DL (ref 0.6–1.2)
DESCRIPTION BLOOD BANK: NORMAL
EOSINOPHILS ABSOLUTE: 0 K/UL (ref 0–0.6)
EOSINOPHILS RELATIVE PERCENT: 0.5 %
GFR AFRICAN AMERICAN: >60
GFR NON-AFRICAN AMERICAN: >60
GLOBULIN: 2.1 G/DL
GLUCOSE BLD-MCNC: 141 MG/DL (ref 70–99)
HCT VFR BLD CALC: 20.9 % (ref 36–48)
HCT VFR BLD CALC: 32.1 % (ref 36–48)
HEMOGLOBIN: 10.3 G/DL (ref 12–16)
HEMOGLOBIN: 6.7 G/DL (ref 12–16)
LYMPHOCYTES ABSOLUTE: 1.5 K/UL (ref 1–5.1)
LYMPHOCYTES RELATIVE PERCENT: 22.6 %
MAGNESIUM: 1.4 MG/DL (ref 1.8–2.4)
MCH RBC QN AUTO: 29.7 PG (ref 26–34)
MCHC RBC AUTO-ENTMCNC: 32.1 G/DL (ref 31–36)
MCV RBC AUTO: 92.7 FL (ref 80–100)
MONOCYTES ABSOLUTE: 0.3 K/UL (ref 0–1.3)
MONOCYTES RELATIVE PERCENT: 4.9 %
NEUTROPHILS ABSOLUTE: 4.8 K/UL (ref 1.7–7.7)
NEUTROPHILS RELATIVE PERCENT: 71.9 %
OCCULT BLOOD DIAGNOSTIC: ABNORMAL
PDW BLD-RTO: 21.4 % (ref 12.4–15.4)
PLATELET # BLD: 228 K/UL (ref 135–450)
PMV BLD AUTO: 8 FL (ref 5–10.5)
POTASSIUM REFLEX MAGNESIUM: 3.3 MMOL/L (ref 3.5–5.1)
RBC # BLD: 2.25 M/UL (ref 4–5.2)
SODIUM BLD-SCNC: 145 MMOL/L (ref 136–145)
TOTAL PROTEIN: 5.2 G/DL (ref 6.4–8.2)
WBC # BLD: 6.7 K/UL (ref 4–11)

## 2021-10-20 PROCEDURE — 6370000000 HC RX 637 (ALT 250 FOR IP): Performed by: PHYSICIAN ASSISTANT

## 2021-10-20 PROCEDURE — 85014 HEMATOCRIT: CPT

## 2021-10-20 PROCEDURE — 2580000003 HC RX 258: Performed by: INTERNAL MEDICINE

## 2021-10-20 PROCEDURE — 2060000000 HC ICU INTERMEDIATE R&B

## 2021-10-20 PROCEDURE — 82270 OCCULT BLOOD FECES: CPT

## 2021-10-20 PROCEDURE — 6360000002 HC RX W HCPCS: Performed by: INTERNAL MEDICINE

## 2021-10-20 PROCEDURE — 85018 HEMOGLOBIN: CPT

## 2021-10-20 PROCEDURE — 83735 ASSAY OF MAGNESIUM: CPT

## 2021-10-20 PROCEDURE — 6370000000 HC RX 637 (ALT 250 FOR IP): Performed by: INTERNAL MEDICINE

## 2021-10-20 PROCEDURE — 86901 BLOOD TYPING SEROLOGIC RH(D): CPT

## 2021-10-20 PROCEDURE — 2700000000 HC OXYGEN THERAPY PER DAY

## 2021-10-20 PROCEDURE — 86923 COMPATIBILITY TEST ELECTRIC: CPT

## 2021-10-20 PROCEDURE — P9016 RBC LEUKOCYTES REDUCED: HCPCS

## 2021-10-20 PROCEDURE — 94761 N-INVAS EAR/PLS OXIMETRY MLT: CPT

## 2021-10-20 PROCEDURE — 94640 AIRWAY INHALATION TREATMENT: CPT

## 2021-10-20 PROCEDURE — 86850 RBC ANTIBODY SCREEN: CPT

## 2021-10-20 PROCEDURE — 85025 COMPLETE CBC W/AUTO DIFF WBC: CPT

## 2021-10-20 PROCEDURE — 99232 SBSQ HOSP IP/OBS MODERATE 35: CPT | Performed by: INTERNAL MEDICINE

## 2021-10-20 PROCEDURE — 6360000002 HC RX W HCPCS: Performed by: NURSE PRACTITIONER

## 2021-10-20 PROCEDURE — 36415 COLL VENOUS BLD VENIPUNCTURE: CPT

## 2021-10-20 PROCEDURE — 80053 COMPREHEN METABOLIC PANEL: CPT

## 2021-10-20 PROCEDURE — 94660 CPAP INITIATION&MGMT: CPT

## 2021-10-20 PROCEDURE — 36430 TRANSFUSION BLD/BLD COMPNT: CPT

## 2021-10-20 PROCEDURE — 86900 BLOOD TYPING SEROLOGIC ABO: CPT

## 2021-10-20 RX ORDER — POTASSIUM CHLORIDE 20 MEQ/1
40 TABLET, EXTENDED RELEASE ORAL ONCE
Status: COMPLETED | OUTPATIENT
Start: 2021-10-20 | End: 2021-10-20

## 2021-10-20 RX ORDER — FUROSEMIDE 10 MG/ML
20 INJECTION INTRAMUSCULAR; INTRAVENOUS ONCE
Status: COMPLETED | OUTPATIENT
Start: 2021-10-20 | End: 2021-10-20

## 2021-10-20 RX ORDER — MAGNESIUM SULFATE IN WATER 40 MG/ML
2000 INJECTION, SOLUTION INTRAVENOUS ONCE
Status: COMPLETED | OUTPATIENT
Start: 2021-10-20 | End: 2021-10-20

## 2021-10-20 RX ORDER — SODIUM CHLORIDE 9 MG/ML
250 INJECTION, SOLUTION INTRAVENOUS PRN
Status: DISCONTINUED | OUTPATIENT
Start: 2021-10-20 | End: 2021-10-26 | Stop reason: HOSPADM

## 2021-10-20 RX ORDER — POTASSIUM CHLORIDE 7.45 MG/ML
20 INJECTION INTRAVENOUS ONCE
Status: COMPLETED | OUTPATIENT
Start: 2021-10-20 | End: 2021-10-20

## 2021-10-20 RX ADMIN — PREDNISONE 40 MG: 20 TABLET ORAL at 08:32

## 2021-10-20 RX ADMIN — FUROSEMIDE 20 MG: 10 INJECTION, SOLUTION INTRAMUSCULAR; INTRAVENOUS at 15:29

## 2021-10-20 RX ADMIN — PANTOPRAZOLE SODIUM 20 MG: 20 TABLET, DELAYED RELEASE ORAL at 08:33

## 2021-10-20 RX ADMIN — MAGNESIUM GLUCONATE 500 MG ORAL TABLET 400 MG: 500 TABLET ORAL at 08:32

## 2021-10-20 RX ADMIN — MAGNESIUM SULFATE HEPTAHYDRATE 2000 MG: 40 INJECTION, SOLUTION INTRAVENOUS at 09:01

## 2021-10-20 RX ADMIN — IPRATROPIUM BROMIDE AND ALBUTEROL SULFATE 1 AMPULE: .5; 2.5 SOLUTION RESPIRATORY (INHALATION) at 23:29

## 2021-10-20 RX ADMIN — FERROUS SULFATE TAB 325 MG (65 MG ELEMENTAL FE) 325 MG: 325 (65 FE) TAB at 08:33

## 2021-10-20 RX ADMIN — IPRATROPIUM BROMIDE AND ALBUTEROL SULFATE 1 AMPULE: .5; 2.5 SOLUTION RESPIRATORY (INHALATION) at 00:32

## 2021-10-20 RX ADMIN — METOPROLOL TARTRATE 25 MG: 25 TABLET, FILM COATED ORAL at 20:53

## 2021-10-20 RX ADMIN — IPRATROPIUM BROMIDE AND ALBUTEROL SULFATE 1 AMPULE: .5; 2.5 SOLUTION RESPIRATORY (INHALATION) at 19:43

## 2021-10-20 RX ADMIN — METOPROLOL TARTRATE 25 MG: 25 TABLET, FILM COATED ORAL at 08:33

## 2021-10-20 RX ADMIN — SODIUM CHLORIDE, PRESERVATIVE FREE 10 ML: 5 INJECTION INTRAVENOUS at 08:41

## 2021-10-20 RX ADMIN — POLYETHYLENE GLYCOL (3350) 17 G: 17 POWDER, FOR SOLUTION ORAL at 08:33

## 2021-10-20 RX ADMIN — IPRATROPIUM BROMIDE AND ALBUTEROL SULFATE 1 AMPULE: .5; 2.5 SOLUTION RESPIRATORY (INHALATION) at 09:11

## 2021-10-20 RX ADMIN — ONDANSETRON 4 MG: 4 TABLET, ORALLY DISINTEGRATING ORAL at 06:12

## 2021-10-20 RX ADMIN — IPRATROPIUM BROMIDE AND ALBUTEROL SULFATE 1 AMPULE: .5; 2.5 SOLUTION RESPIRATORY (INHALATION) at 15:22

## 2021-10-20 RX ADMIN — ATORVASTATIN CALCIUM 20 MG: 10 TABLET, FILM COATED ORAL at 20:53

## 2021-10-20 RX ADMIN — POTASSIUM CHLORIDE 20 MEQ: 7.46 INJECTION, SOLUTION INTRAVENOUS at 08:46

## 2021-10-20 RX ADMIN — BUSPIRONE HYDROCHLORIDE 10 MG: 10 TABLET ORAL at 20:53

## 2021-10-20 RX ADMIN — SODIUM CHLORIDE 25 ML: 9 INJECTION, SOLUTION INTRAVENOUS at 09:09

## 2021-10-20 RX ADMIN — BUSPIRONE HYDROCHLORIDE 10 MG: 10 TABLET ORAL at 08:33

## 2021-10-20 RX ADMIN — SERTRALINE HYDROCHLORIDE 50 MG: 50 TABLET ORAL at 08:33

## 2021-10-20 RX ADMIN — BUSPIRONE HYDROCHLORIDE 10 MG: 10 TABLET ORAL at 13:47

## 2021-10-20 RX ADMIN — IPRATROPIUM BROMIDE AND ALBUTEROL SULFATE 1 AMPULE: .5; 2.5 SOLUTION RESPIRATORY (INHALATION) at 11:40

## 2021-10-20 RX ADMIN — POTASSIUM CHLORIDE 40 MEQ: 1500 TABLET, EXTENDED RELEASE ORAL at 08:32

## 2021-10-20 RX ADMIN — BISACODYL 20 MG: 5 TABLET, COATED ORAL at 15:29

## 2021-10-20 ASSESSMENT — PAIN SCALES - GENERAL
PAINLEVEL_OUTOF10: 0
PAINLEVEL_OUTOF10: 0

## 2021-10-20 NOTE — PROGRESS NOTES
10/20/21 0252   NIV Type   Equipment Type V60   Mode Bilevel   Mask Type Full face mask   Mask Size Small   Settings/Measurements   IPAP 16 cmH20   CPAP/EPAP 8 cmH2O   Rate Ordered 16   Resp 18   FiO2  35 %   Vt Exhaled 410 mL   Mask Leak (lpm) 21 lpm   Comfort Level Good   Using Accessory Muscles No   SpO2 98   Alarm Settings   Alarms On Y

## 2021-10-20 NOTE — PROGRESS NOTES
RT Inhaler-Nebulizer Bronchodilator Protocol Note    There is a bronchodilator order in the chart from a provider indicating to follow the RT Bronchodilator Protocol and there is an Initiate RT Inhaler-Nebulizer Bronchodilator Protocol order as well (see protocol at bottom of note). CXR Findings:  No results found. The findings from the last RT Protocol Assessment were as follows:   History Pulmonary Disease: Chronic pulmonary disease  Respiratory Pattern: Dyspnea on exertion or RR 21-25 bpm  Breath Sounds: Inspiratory and expiratory or bilateral wheezing and/or rhonchi  Cough: Weak, non-productive  Indication for Bronchodilator Therapy: Decreased or absent breath sounds  Bronchodilator Assessment Score: 13    Aerosolized bronchodilator medication orders have been revised according to the RT Inhaler-Nebulizer Bronchodilator Protocol below. Respiratory Therapist to perform RT Therapy Protocol Assessment initially then follow the protocol. Repeat RT Therapy Protocol Assessment PRN for score 0-3 or on second treatment, BID, and PRN for scores above 3. No Indications  adjust the frequency to every 6 hours PRN wheezing or bronchospasm, if no treatments needed after 48 hours then discontinue using Per Protocol order mode. If indication present, adjust the RT bronchodilator orders based on the Bronchodilator Assessment Score as indicated below. Use Inhaler orders unless patient has one or more of the following: on home nebulizer, not able to hold breath for 10 seconds, is not alert and oriented, cannot activate and use MDI correctly, or respiratory rate 25 breaths per minute or more, then use the equivalent nebulizer order(s) with same Frequency and PRN reasons based on the score. If a patient is on this medication at home then do not decrease Frequency below that used at home.     0-3  enter or revise RT bronchodilator order(s) to equivalent RT Bronchodilator order with Frequency of every 4 hours PRN for wheezing or increased work of breathing using Per Protocol order mode. 4-6  enter or revise RT Bronchodilator order(s) to two equivalent RT bronchodilator orders with one order with BID Frequency and one order with Frequency of every 4 hours PRN wheezing or increased work of breathing using Per Protocol order mode. 7-10  enter or revise RT Bronchodilator order(s) to two equivalent RT bronchodilator orders with one order with TID Frequency and one order with Frequency of every 4 hours PRN wheezing or increased work of breathing using Per Protocol order mode. 11-13  enter or revise RT Bronchodilator order(s) to one equivalent RT bronchodilator order with QID Frequency and an Albuterol order with Frequency of every 4 hours PRN wheezing or increased work of breathing using Per Protocol order mode. Greater than 13  enter or revise RT Bronchodilator order(s) to one equivalent RT bronchodilator order with every 4 hours Frequency and an Albuterol order with Frequency of every 2 hours PRN wheezing or increased work of breathing using Per Protocol order mode.          Electronically signed by Serina Spaulding RCP on 10/19/2021 at 9:55 PM

## 2021-10-20 NOTE — PROGRESS NOTES
Assessment completed, see flowsheets. VSS. Night meds given. Pt reports pain in right side, states she feels like she is bloated or drank too much. Also reports a slight headache. Tylenol given per mar. Bed/chair alarm on, bed in lowest position with call light in reach.     Venita Celeste RN

## 2021-10-20 NOTE — PROGRESS NOTES
Admit: 10/17/2021    Name:  Meka Yuen  Room:  [facility]/7325-24  MRN:    [id number]    Daily Progress Note for 10/20/2021     Admitted with acute on chronic respiratory failure with hypoxia and hypercarbia  Multiple admissions over last 2 months    Interval History:   10/19  She was on BiPAP last night.   Currently on 2 L of oxygen  Hemoglobin dropped to 6.7--    10/20  Pt remains in bed, reports breathing slightly better after lasix  Drop in h.h with no visible bleeding noted  Holding plavix for outpt thoracocentesis    Family at bedside     Scheduled Meds:   furosemide  20 mg IntraVENous Once    predniSONE  40 mg Oral Daily    atorvastatin  20 mg Oral Nightly    busPIRone  10 mg Oral TID    [Held by provider] clopidogrel  75 mg Oral Daily    ferrous sulfate  325 mg Oral Daily with breakfast    magnesium oxide  400 mg Oral Daily    metoprolol tartrate  25 mg Oral BID    pantoprazole  20 mg Oral Daily    polyethylene glycol  17 g Oral BID    sertraline  50 mg Oral Daily    [Held by provider] enoxaparin  40 mg SubCUTAneous Daily    ipratropium-albuterol  1 ampule Inhalation Q4H WA    sodium chloride flush  10 mL IntraVENous 2 times per day       Continuous Infusions:   sodium chloride      sodium chloride 25 mL (10/20/21 0909)    dextrose         PRN Meds:  sodium chloride, melatonin, ondansetron **OR** ondansetron, polyethylene glycol, acetaminophen **OR** acetaminophen, albuterol, sodium chloride flush, sodium chloride, glucose, dextrose, glucagon (rDNA), dextrose                  Objective:     Temp  Av.3 °F (36.3 °C)  Min: 96.7 °F (35.9 °C)  Max: 98.1 °F (36.7 °C)  Pulse  Av.7  Min: 75  Max: 92  BP  Min: 116/69  Max: 125/75  SpO2  Av.3 %  Min: 95 %  Max: 100 %  FiO2   Av %  Min: 35 %  Max: 35 %  Patient Vitals for the past 4 hrs:   SpO2   10/20/21 1141 100 %         Intake/Output Summary (Last 24 hours) at 10/20/2021 1300  Last data filed at 10/20/2021 0948  Gross per 24 Additional Contrast? None   Final Result   No significant acute abdominal abnormality. No retroperitoneal bleed. Very   small amount of free fluid in the right pelvis of uncertain etiology and   significance. Cholelithiasis without finding of acute cholecystitis. Large   left and moderate right pleural effusions with bibasilar atelectasis. Mild   anasarca. RECOMMENDATIONS:   If the patient is short of breath, ultrasound-guided thoracentesis would be   recommended for its diagnostic and therapeutic benefits. XR CHEST PORTABLE   Final Result   1. Patchy airspace disease, right perihilar region, suggesting pneumonia or   atelectasis   2. Small pleural effusions   3. Improved aeration present within the left lung base, consistent with   resolving atelectasis and or pneumonia   4. Underlying interstitial pulmonary fibrosis               Assessment & Plan:         Acute on chronic hypoxic and hypercarbic respiratory failure. -Secondary to acute COPD exacerbation possible healthcare associated pneumonia. -BiPAP at night. Currently on 2 L of oxygen.  -Continue prednisone 40 mg daily.  -was given  cefepime and vancomycin day #4. And doxycycline day #3 , however pt has been on multiple rounds of abx recently and no clear evidence of pna this time   Wbc stable. Will stop abx   - left pleural effusion - on going diuresis, planned for thoracocentesis soon  - pulmonary consulted.       Acute on chronic anemia  -Her hemoglobin is 8.1 on 10/13/2021  10.3--> 7.5--> 7.6 -->6.7-->7.6-->7.9--6.7  - give 1 unit PRBC today and per pulmonary recommendations, give Lasix when starting blood  -Recent iron studies shows anemia of iron deficiency and chronic disease  -CT abd/pelvis   -Hemoccult stools - ordered not collected  -She had a colonoscopy 2/2020-showed hemorrhoids-Dr. Lo Jobs  -Holding Plavix for now  - GI consulted    Hypokalemia   - 3.3, replacement ordered  - monitor, repeat in am     Hypomagnesemia  - 1.4, 2 gm replacement ordered  -monitor  - repeat in am      #CAD  #HTN  - cont lopressor, statin  Hold plavix 2 to drop in H/H     #GERD  - cont PPI     #Severe malnutrition  #Generalized weakness and debility        DVT Prophylaxis: scds  Diet: general  Code Status: Full Code     Alex Johnson MD,

## 2021-10-20 NOTE — CARE COORDINATION
INTERDISCIPLINARY PLAN OF CARE CONFERENCE    Date/Time: 10/20/2021 4:05 PM  Completed by: Yunior Cardoza RN, Case Management      Patient Name:  Eros Fontana  YOB: 1940  Admitting Diagnosis: Acute on chronic respiratory failure with hypoxia and hypercapnia ([facility] [address] [M83.35, J96.22]     Admit Date/Time:  10/17/2021  5:32 PM    Chart reviewed. Interdisciplinary team contacted or reviewed plan related to patient progress and discharge plans. Disciplines included Case Management, Nursing, and Dietitian. Current Status:PCU; getting blood today  PT/OT recommendation for discharge plan of care: NA    Expected D/C Disposition:  [facility]  Discharge Plan Comments: Chart reviewed. Pt form LTC @ [facility] and plan continues for return.  Per Emelia @ [facility] +bedhold will need negative covid test within 72 hrs of dc    Home O2 in place on admit: ecf  Pt informed of need to bring portable home O2 tank on day of discharge for nursing to connect prior to leaving:  Not Indicated  Verbalized agreement/Understanding:  Not Indicated

## 2021-10-20 NOTE — CONSULTS
Gastroenterology Consult Note    Patient:   Juice Forde   :    1940   Facility:   [facility]  Referring/PCP: Padma Sandoval MD  Date:     10/20/2021  Consultant:   Ethelene Bumpers, PA-C      Chief Complaint   Patient presents with    Shortness of Breath     from facility, per staff, pt noted to have increased WOB, and decreased O2 sat, NRB applied, pt briefly bagged,  sats in 90's placed on Nc and immediately dropped, NRB replaced and squad called, enrout pt on NRB, Bipap placed on arrival        History of Present illness     [de-identified]year old female with a history of HTN, HLD, COPD, NADJA, asthma, anxiety, GERD, and OA presented to the ED with SOB. She was admitted with acute hypercarbic respiratory failure. GI was consulted for evaluation of recurrent anemia. She admits to nausea, RLQ abdominal pain, melena, and weight loss. She lost 20 lbs over the past 2 months. She passes one BM daily on average. She admits to easy bruising. She denies vomiting, heartburn, cramping, bloating, rectal bleeding, and early satiety. She takes Tylenol 2x per week for headache relief. Her last EGD in 2021 showed mild non-erosive gastritis. Her last colonoscopy in 2020 was poor prep, and showed hemorrhoids. She has a personal history of colon polyps.       Past Medical History:   Diagnosis Date    NADJA [facility]acute kidney injury) ([facility] [address] [facility]     Asthma     Chronic anxiety     COPD (chronic obstructive pulmonary disease) (HCC)     GERD (gastroesophageal reflux disease) 2021    Hyperlipidemia     Hypertension     MRSA (methicillin resistant staph aureus) culture positive 2019    + resp cx    OA (osteoarthritis) 2014     Past Surgical History:   Procedure Laterality Date    BRONCHOSCOPY  2019    Dr. Ritu Monreal - w/BAL   [address]  2019    Dr. Gonzalo Gandhi N/A 2020    COLONOSCOPY DIAGNOSTIC performed by Eladio Crook DO at . [facility] 86 GRAFT N/A 2019    OFF PUMP CORONARY ARTERY BYPASS GRAFTING X2, INTERNAL MAMMARY ARTERY, SAPHENOUS VEIN GRAFT performed by Juno Neri MD at [facility] CATH  2021    [facility] CATH 2021 [address] PROCEDURES    MASTECTOMY, PARTIAL Left     SIGMOIDOSCOPY  2020    4 bands    SIGMOIDOSCOPY N/A 2020    FLEX SIG W/ BANDING SIGMOIDOSCOPY DIAGNOSTIC FLEXIBLE performed by Maria E Ramirez DO at [address] [place] [address] [place]:   Social History     Tobacco Use    Smoking status: Former Smoker     Packs/day: 0.50     Years: 50.00     Pack years: 25.00     Types: Cigarettes     Quit date: 2019     Years since quittin.0    Smokeless tobacco: Never Used    Tobacco comment: advised to quit   Substance Use Topics    Alcohol use: No     Family:   Family History   Problem Relation Age of Onset    Cancer Mother     Heart Disease Father     Stroke Father     Heart Disease Sister     Stroke Sister      No current facility-administered medications on file prior to encounter. Current Outpatient Medications on File Prior to Encounter   Medication Sig Dispense Refill    LORazepam (ATIVAN) 0.5 MG tablet Take 0.5 mg by mouth nightly as needed.       albuterol sulfate (PROAIR RESPICLICK) 061 (90 Base) MCG/ACT aerosol powder inhalation 2 puff: EVERY 6 HOURS      OXYGEN Inhale 2 L into the lungs      predniSONE (DELTASONE) 10 MG tablet 3 tabs a day for 5 days,  Then 2 tabs a day for 5 days ,  Then 1 tab daily 25 tablet 0    miconazole nitrate 2 % OINT Apply topically 2 times daily 1 each 0    busPIRone (BUSPAR) 10 MG tablet Take 10 mg by mouth 3 times daily Indications: Feeling Anxious      ondansetron (ZOFRAN-ODT) 4 MG disintegrating tablet Take 1 tablet by mouth every 8 hours as needed for Nausea or Vomiting      furosemide (LASIX) 20 MG tablet Take 1 tablet by mouth See Admin Instructions Tuesday, friday      magnesium oxide (MAG-OX) 400 (241.3 Mg) MG TABS tablet Take 1 tablet by mouth daily      potassium & sodium phosphates (PHOS-NAK) 280-160-250 MG PACK Take 1 packet by mouth daily  0    menthol-zinc oxide (CALMOSEPTINE) 0.44-20.6 % OINT ointment Apply topically 2 times daily as needed (Apply to buttocks until resolved) Max 30 ml per day.  1 g 0    polyethylene glycol (GLYCOLAX) 17 g packet Take 17 g by mouth 2 times daily 527 g 0    melatonin 5 MG TBDP disintegrating tablet Take 1 tablet by mouth nightly as needed (sleep)      fluticasone (FLONASE) 50 MCG/ACT nasal spray 1 spray by Each Nostril route daily as needed for Rhinitis 16 g 0    pantoprazole (PROTONIX) 20 MG tablet Take 20 mg by mouth daily      ferrous sulfate (IRON 325) 325 (65 Fe) MG tablet Take 325 mg by mouth daily (with breakfast)      Roflumilast (DALIRESP) 250 MCG tablet Take 250 mcg by mouth daily      acetaminophen (TYLENOL) 500 MG tablet Take 500 mg by mouth every 6 hours as needed for Pain      sertraline (ZOLOFT) 50 MG tablet Take 50 mg by mouth daily      budesonide (PULMICORT) 0.5 MG/2ML nebulizer suspension Take 2 mLs by nebulization 2 times daily 60 ampule 3    ipratropium-albuterol (DUONEB) 0.5-2.5 (3) MG/3ML SOLN nebulizer solution Inhale 3 mLs into the lungs every 6 hours 360 mL 0    atorvastatin (LIPITOR) 20 MG tablet Take 1 tablet by mouth nightly 30 tablet 3    metoprolol tartrate (LOPRESSOR) 25 MG tablet Take 1 tablet by mouth 2 times daily 60 tablet 3    docusate sodium (COLACE, DULCOLAX) 100 MG CAPS Take 100 mg by mouth 2 times daily 30 capsule 0    clopidogrel (PLAVIX) 75 MG tablet Take 1 tablet by mouth daily 30 tablet 3    tiotropium (SPIRIVA HANDIHALER) 18 MCG inhalation capsule Inhale 18 mcg into the lungs daily      budesonide-formoterol (SYMBICORT) 160-4.5 MCG/ACT AERO Inhale 2 puffs into the lungs 2 times daily      albuterol (PROVENTIL HFA) 108 (90 BASE) MCG/ACT inhaler Inhale 2 puffs into the lungs every 6 hours as needed for Wheezing or Shortness of Breath. 1 Inhaler 2      Infusions:    sodium chloride      sodium chloride 25 mL (10/20/21 0909)    dextrose       PRN Medications: sodium chloride, melatonin, ondansetron **OR** ondansetron, polyethylene glycol, acetaminophen **OR** acetaminophen, albuterol, sodium chloride flush, sodium chloride, glucose, dextrose, glucagon (rDNA), dextrose  Allergies: Allergies   Allergen Reactions    Latex Other (See Comments)     Burning      Codeine Other (See Comments)     \"hallucinations\"       ROS:   Constitutional: negative for chills, fevers and sweats  Eyes: negative for cataracts, icterus and redness  Ears, nose, mouth, throat, and face: negative for epistaxis, hearing loss and sore throat  Respiratory: negative for cough, hemoptysis and sputum  Cardiovascular: negative for chest pain and lower extremity edema. Positive for dyspnea. Gastrointestinal: as per HPI  Genitourinary:negative for dysuria, frequency and hematuria  Neurological: negative for coordination problems, dizziness and gait problems  Behavioral/Psych: negative for depression and mood swings. Positive for anxiety. Physical Exam   /69   Pulse 75   Temp 98.1 °F (36.7 °C) (Oral)   Resp 18   Ht 5' 1\" (1.549 m)   Wt 112 lb 12.8 oz (51.2 kg)   SpO2 100%   BMI 21.31 kg/m²       General appearance: alert, appears older than stated age, cooperative, fatigued, no distress, syndromic appearance - chronically ill appearing and ecchymosis of bilateral shins, chest, and bilateral forearms and hands  Head: Normocephalic, without obvious abnormality, atraumatic  Eyes: conjunctivae/corneas clear. PERRL, EOM's intact. Fundi benign.   Neck: no adenopathy and supple, symmetrical, trachea midline  Lungs: clear to auscultation bilaterally  Heart: regular rate and rhythm, S1, S2 normal, no murmur, click, rub or gallop  Abdomen: normal findings: bowel sounds normal, no masses palpable and symmetric and abnormal findings:  tenderness mild in the periumbilical area, in the RUQ and in the RLQ  Extremities: ecchymosis of bilateral shins, forearms and hands    Lab and Imaging Review   Labs:  CBC:   Recent Labs     10/18/21  0417 10/18/21  0417 10/19/21  0743 10/19/21  0743 10/19/21  0916 10/19/21  1742 10/20/21  0522   WBC 6.3  --  7.0  --   --   --  6.7   HGB 7.5*   < > 6.7*   < > 7.6* 7.9* 6.7*   HCT 24.3*   < > 21.4*   < > 24.1* 24.6* 20.9*   MCV 94.3  --  90.0  --   --   --  92.7     --  232  --   --   --  228    < > = values in this interval not displayed. BMP:   Recent Labs     10/18/21  0417 10/19/21  0744 10/20/21  0522   * 142 145   K 3.9 3.8 3.3*    106 105   CO2 30 29 32   BUN 15 19 24*   CREATININE 0.8 0.9 0.9     LIVER PROFILE:   Recent Labs     10/18/21  0417 10/19/21  0744 10/20/21  0522   AST 14* 12* 10*   ALT 15 11 10   PROT 5.7* 5.0* 5.2*   BILITOT 0.4 0.4 0.4   ALKPHOS 74 60 59     PT/INR:   Recent Labs     10/17/21  1805   INR 0.91       IMAGING:  CT ABDOMEN PELVIS WO CONTRAST Additional Contrast? None   Final Result   No significant acute abdominal abnormality. No retroperitoneal bleed. Very   small amount of free fluid in the right pelvis of uncertain etiology and   significance. Cholelithiasis without finding of acute cholecystitis. Large   left and moderate right pleural effusions with bibasilar atelectasis. Mild   anasarca. RECOMMENDATIONS:   If the patient is short of breath, ultrasound-guided thoracentesis would be   recommended for its diagnostic and therapeutic benefits. XR CHEST PORTABLE   Final Result   1. Patchy airspace disease, right perihilar region, suggesting pneumonia or   atelectasis   2. Small pleural effusions   3. Improved aeration present within the left lung base, consistent with   resolving atelectasis and or pneumonia   4.  Underlying interstitial pulmonary fibrosis           Attending Supervising Physician's Attestation Statement  The patient is a [de-identified] y.o. female. I have performed a history and physical examination of the patient. I discussed the case with my physician assistant Freeman Jarrett PA-C    I reviewed the patient's Past Medical History, Past Surgical History, Medications, and Allergies. Physical Exam:  Vitals:    10/20/21 1522 10/20/21 1537 10/20/21 1731 10/20/21 1830   BP:  (!) 148/85 (!) 154/89 (!) 144/85   Pulse:  83 84 87   Resp:  18 18 18   Temp:  96.9 °F (36.1 °C) 97.3 °F (36.3 °C) 97.9 °F (36.6 °C)   TempSrc:  Oral Oral Oral   SpO2: 99% 97% 97% 96%   Weight:       Height:           Physical Examination: General appearance - alert, well appearing, and in no distress  Mental status - alert, oriented to person, place, and time  Eyes - pupils equal and reactive, extraocular eye movements intact  Neck - supple, no significant adenopathy  Chest - clear to auscultation, no wheezes, rales or rhonchi, symmetric air entry  Heart - normal rate, regular rhythm, normal S1, S2, no murmurs, rubs, clicks or gallops  Abdomen - soft, nontender, nondistended, no masses or organomegaly  Extremities - pedal edema 2 +            Assessment:     [de-identified]year old female with a history of HTN, HLD, COPD, NADJA, asthma, anxiety, GERD, and OA admitted with acute hypercarbic respiratory failure c/b recurrent iron deficiency anemia superimposed on anemia of chronic disease. Plan:   1. Continue supportive care  2. Monitor Hgb  3. Observe for signs of bleeding  4. Monitor and document output  5. Continue PO iron supplementation  6. Continue Pantoprazole qAM  7. Continue bowel regimen with Miralax BID and Dulcolax qd  8. Hemoccult pending  9. Clear liquid diet as tolerated  10. Encourage nutrition  11. Pulmonology following - planning thoracentesis  12. Will follow  13.  Will consider colonoscopy as inpatient, possibly Friday        Ugo White PA-C  2:31 PM 10/20/2021                      [de-identified]year old female with a history of HTN,

## 2021-10-20 NOTE — PROGRESS NOTES
Spoke with clinical who informed me pt has a dressed skin tear to left hand, upon inspection of wound, patients tendon is exposed. Wet to dry       dressing in place and wound care consulted.

## 2021-10-20 NOTE — PROGRESS NOTES
Lab called critical hemoglobin 6.7, hematocrit 20.9. Dr. Alejandra Gant notified via perfect serve.      Rosendo Meng RN

## 2021-10-20 NOTE — PROGRESS NOTES
0430: Was asked by patient's nurse to draw blood for lab due to  unable to draw after two attempts. Was able to gain vascular access to left forearm and draw labs. Patient c/o of her left had being \"escrutiatingly\" painful. Noted that patient had bruising and and dressing to left hand and all finger tips were pale. Removed dressing to site and found a large hematoma to dorsal of left hand. With a large skin tear approximately 2.5 cm x 2 cm that was bloody and was able to observe a tendon that appeared to be exposed. Notified Yue Wilkes RN Clinical  who came to bed and assessed. Was able to obtain a radial pulse with doppler. After leaving the dressing off patient's fingers looked more pink and feeling warmer. Redressed site loosely and updated the nurse caring for patient, Chapincito Hutchins RN.      Electronically signed by Yosi Jay RN on 10/20/2021 at 7:42 AM

## 2021-10-20 NOTE — CONSULTS
[facility] Wound Ostomy Continence Nurse  Consult Note       NAME:  Gisela [facility]ra [facility] LEELA RODRIGUEZ  MEDICAL RECORD NUMBER:  [id number]  AGE: [de-identified] y.o. GENDER: female  : 1940  TODAY'S DATE:  10/20/2021    Subjective Pt is alert and oriented, concerned with left hand wound   Reason for WOCN Evaluation and Assessment: left hand, sacrum/buttocks      Rubens Devine is a [de-identified] y.o. female referred by:   [x] Physician  [] Nursing  [] Other:     Wound Identification:  Wound Type: skin tear , MASD  Contributing Factors: moisture , shear force    Pt seen for sacrum and left hand. Pt unsure of how wound occurred to left hand. She states she thinks it may have happened in the ambulance. Pt known to me and seen on last admission for sacrum where pt has fungal rash and partial thickness skin loss related to incontinence. Pt has Julia Salaam in place at this time, foam dressing in pace and dressing is dry and intact.       Patient Goal of Care:  [x] Wound Healing  [] Odor Control  [] Palliative Care  [] Pain Control   [] Other:         PAST MEDICAL HISTORY        Diagnosis Date    [facility]) ([facility] [address]     Asthma     Chronic anxiety     COPD [facility] obstructive [facility]) [facility]     GERD (gastroesophageal reflux disease) 2021    Hyperlipidemia     Hypertension     MRSA (methicillin resistant staph aureus) culture positive 2019    + resp cx    OA (osteoarthritis) 2014       PAST SURGICAL HISTORY    Past Surgical History:   Procedure Laterality Date    BRONCHOSCOPY  2019    Dr. Jose Dunne - w/BAL   [address]  2019    Dr. Destiny James 2020    COLONOSCOPY DIAGNOSTIC performed by Moses Ayala DO at [address] N/A 2019    OFF PUMP CORONARY ARTERY BYPASS GRAFTING X2, INTERNAL MAMMARY ARTERY, SAPHENOUS VEIN GRAFT performed by Kendy Carney MD at [facility] 2021    IR MIDLINE CATH 2021 [facility] PROCEDURES    MASTECTOMY, PARTIAL Left     SIGMOIDOSCOPY  2020    4 bands    SIGMOIDOSCOPY N/A 2020    FLEX SIG W/ BANDING SIGMOIDOSCOPY DIAGNOSTIC FLEXIBLE performed by Tabitha Gomez DO at [address] [place] [address]    Family History   Problem Relation Age of Onset    Cancer Mother     Heart Disease Father     Stroke Father     Heart Disease Sister     Stroke Sister        SOCIAL HISTORY    Social History     Tobacco Use    Smoking status: Former Smoker     Packs/day: 0.50     Years: 50.00     Pack years: 25.00     Types: Cigarettes     Quit date: 2019     Years since quittin.0    Smokeless tobacco: Never Used    Tobacco comment: advised to quit   Vaping Use    Vaping Use: Never assessed   Substance Use Topics    Alcohol use: No    Drug use: No       ALLERGIES    Allergies   Allergen Reactions    Latex Other (See Comments)     Burning      Codeine Other (See Comments)     \"hallucinations\"       MEDICATIONS    No current facility-administered medications on file prior to encounter. Current Outpatient Medications on File Prior to Encounter   Medication Sig Dispense Refill    LORazepam (ATIVAN) 0.5 MG tablet Take 0.5 mg by mouth nightly as needed.       albuterol sulfate (PROAIR RESPICLICK) 108 (90 Base) MCG/ACT aerosol powder inhalation 2 puff: EVERY 6 HOURS      OXYGEN Inhale 2 L into the lungs      predniSONE (DELTASONE) 10 MG tablet 3 tabs a day for 5 days,  Then 2 tabs a day for 5 days ,  Then 1 tab daily 25 tablet 0    miconazole nitrate 2 % OINT Apply topically 2 times daily 1 each 0    busPIRone (BUSPAR) 10 MG tablet Take 10 mg by mouth 3 times daily Indications: Feeling Anxious      ondansetron (ZOFRAN-ODT) 4 MG disintegrating tablet Take 1 tablet by mouth every 8 hours as needed for Nausea or Vomiting      furosemide (LASIX) 20 MG tablet Take 1 tablet by mouth See Admin Instructions Tuesday, friday      magnesium oxide (MAG-OX) 400 (241.3 Mg) MG TABS tablet Take 1 tablet by mouth daily      potassium & sodium phosphates (PHOS-NAK) 280-160-250 MG PACK Take 1 packet by mouth daily  0    menthol-zinc oxide (CALMOSEPTINE) 0.44-20.6 % OINT ointment Apply topically 2 times daily as needed (Apply to buttocks until resolved) Max 30 ml per day.  1 g 0    polyethylene glycol (GLYCOLAX) 17 g packet Take 17 g by mouth 2 times daily 527 g 0    melatonin 5 MG TBDP disintegrating tablet Take 1 tablet by mouth nightly as needed (sleep)      fluticasone (FLONASE) 50 MCG/ACT nasal spray 1 spray by Each Nostril route daily as needed for Rhinitis 16 g 0    pantoprazole (PROTONIX) 20 MG tablet Take 20 mg by mouth daily      ferrous sulfate (IRON 325) 325 (65 Fe) MG tablet Take 325 mg by mouth daily (with breakfast)      Roflumilast (DALIRESP) 250 MCG tablet Take 250 mcg by mouth daily      acetaminophen (TYLENOL) 500 MG tablet Take 500 mg by mouth every 6 hours as needed for Pain      sertraline (ZOLOFT) 50 MG tablet Take 50 mg by mouth daily      budesonide (PULMICORT) 0.5 MG/2ML nebulizer suspension Take 2 mLs by nebulization 2 times daily 60 ampule 3    ipratropium-albuterol (DUONEB) 0.5-2.5 (3) MG/3ML SOLN nebulizer solution Inhale 3 mLs into the lungs every 6 hours 360 mL 0    atorvastatin (LIPITOR) 20 MG tablet Take 1 tablet by mouth nightly 30 tablet 3    metoprolol tartrate (LOPRESSOR) 25 MG tablet Take 1 tablet by mouth 2 times daily 60 tablet 3    docusate sodium (COLACE, DULCOLAX) 100 MG CAPS Take 100 mg by mouth 2 times daily 30 capsule 0    clopidogrel (PLAVIX) 75 MG tablet Take 1 tablet by mouth daily 30 tablet 3    tiotropium (SPIRIVA HANDIHALER) 18 MCG inhalation capsule Inhale 18 mcg into the lungs daily      budesonide-formoterol (SYMBICORT) 160-4.5 MCG/ACT AERO Inhale 2 puffs into the lungs 2 times daily      albuterol (PROVENTIL HFA) 108 (90 BASE) MCG/ACT inhaler Inhale 2 puffs into the lungs every 6 hours as needed for Wheezing or Shortness of Breath.  1 Inhaler 2       Objective    BP (!) 148/85   Pulse 83   Temp 96.9 °F (36.1 °C) (Oral)   Resp 18   Ht 5' 1\" (1.549 m)   Wt 112 lb 12.8 oz (51.2 kg)   SpO2 97%   BMI 21.31 kg/m²     LABS:  WBC:    Lab Results   Component Value Date    WBC 6.7 10/20/2021     H/H:    Lab Results   Component Value Date    HGB 6.7 10/20/2021    HCT 20.9 10/20/2021     PTT:    Lab Results   Component Value Date    APTT 30.4 09/28/2021   [APTT}  PT/INR:    Lab Results   Component Value Date    PROTIME 10.3 10/17/2021    INR 0.91 10/17/2021     HgBA1c:    Lab Results   Component Value Date    LABA1C 5.3 10/17/2021       Assessment   Trey Risk Score: Trey Scale Score: 18    Patient Active Problem List   Diagnosis Code    Hyperlipidemia E78.5    Asthma J45.909    OA (osteoarthritis) M19.90    Tobacco use Z72.0    COPD exacerbation ([facility] J44.1    Sepsis ([facility] A41.9    Abnormal chest x-ray R93.89    COPD with acute exacerbation ([facility]) J44.1    Shortness of breath R06.02    Tobacco abuse Z72.0    Moderate malnutrition ([facility]) E44.0    NSTEMI (non-ST elevated myocardial infarction) ([facility]) I21.4    Elevated troponin R77.8    Acute respiratory failure with hypoxia ([facility]) J96.01    CAD (coronary artery disease) I25.10    Acute pulmonary edema ([facility]) J81.0    Pulmonary infiltrate R91.8    Elevated brain natriuretic peptide (BNP) level R79.89    Leukocytosis D72.829    Ischemic cardiomyopathy I25.5    Acute combined systolic and diastolic congestive heart failure ([facility]) I50.41    Hypernatremia E87.0    NADJA (acute kidney injury) ([facility] N17.9    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    Mucus plugging of bronchi T17.500A    CAD in native artery I25.10    S/P CABG (coronary artery bypass graft) Z95.1    Pneumonia of both lower lobes due to methicillin resistant Staphylococcus aureus (MRSA) ([facility]) J15.212    GI bleed K92.2    Severe malnutrition ([facility] E43    Chest pain R07.9    Chronic respiratory failure with hypoxia ([facility]) J96.11    Primary hypertension I10    Anemia D64.9    Acute respiratory failure (HCC) J96.00    Acute respiratory distress R06.03    Volume overload E87.70    Demand ischemia ([facility]) I24.8    GERD (gastroesophageal reflux disease) K21.9    Acute respiratory failure with hypoxia and hypercapnia (HCC) J96.01, J96.02    Shock ([facility]) R57.9    Pneumonia due to infectious organism J18.9    Acute on chronic respiratory failure with hypoxia and hypercapnia (HCC) J96.21, J96.22    Chronic obstructive pulmonary disease (HCC) J44.9    Acute on chronic respiratory failure with hypoxemia (HCC) J96.21    Chronic anxiety F41.9    Congestive heart failure ([facility]) I50.9    Acute respiratory failure with hypoxia and hypercarbia (HCC) J96.01, J96.02    Acute kidney injury (HCC) N17.9    Elevated procalcitonin R79.89    COPD, severe (HCC) J44.9    Anxiety F41.9    Severe anxiety F41.9    Severe protein-calorie malnutrition (HCC) E43    HCAP (healthcare-associated pneumonia) J18.9    Pleural effusion J90     Resolving fungal rash, healed partial thickness skin loss, improved from last admission. Intact foam dressing in place. left hand:  Skin tear, no flap present. Partial thickness. 100% red moist with visible vascular structure. Skin is thin and fragile with ecchymosis from mid forearm to distal fingers, but not to tips. Wound 10/20/21 Hand Right;Dorsal skin tear to left hand, tendon exposed (Active)   Number of days: 0            Response to treatment:  Well tolerated by patient. Pain Assessment:  Severity:  0 / 10  Quality of pain: N/A  Wound Pain Timing/Severity: none  Premedicated: N/A    Plan  Sacrum:  Foam dressing and change every 3 days and prn. Left hand:  Cleanse with NS, pat dry. Apply Xeroform gauze and cover with dry gauze, secure with roll gauze and tape, change daily. Specialty Bed Required : No   [] Low Air Loss   [] Pressure Redistribution  [] Fluid Immersion  [] Bariatric  [] Total Pressure Relief  [] Other:     Current Diet: ADULT DIET;  Clear Liquid  ADULT ORAL NUTRITION SUPPLEMENT; Breakfast, Dinner; Clear Liquid Oral Supplement  Dietician consult:  Yes    Discharge Plan:  Placement for patient upon discharge: skilled nursing    Patient appropriate for Outpatient [address]: Yes    Referrals:  [x]   [] [facility]  [] Supplies  [] Other    Patient/Caregiver Teaching:  Level of patient/caregiver understanding able to:   [] Indicates understanding       [] Needs reinforcement  [] Unsuccessful      [x] Verbal Understanding  [] Demonstrated understanding       [] No evidence of learning  [] Refused teaching         [] N/A       Electronically signed by Raad Hernández RN, CWOCN on 10/20/2021 at 5:17 PM

## 2021-10-20 NOTE — PROGRESS NOTES
Report given to Palomo RN at patient bedside. Pt is stable, call light in reach, with no needs at this time. Care transferred at this time.      Antwan Ragland RN

## 2021-10-20 NOTE — PROGRESS NOTES
I ordered 20 mEQ IV KCl and 40 mEQ PO KCl in anticipation of again dosing lasix today. Plan to give lasix 1st dose at time of starting blood transfusion, if okay with Internal Medicine.

## 2021-10-20 NOTE — PROGRESS NOTES
Pulmonary Progress Note  CC: Acute Hypercarbic Respiratory Failure     Subjective:    C/O nausea but shortness of breath clearly better     IV line peripheral     EXAM:   /69   Pulse 75   Temp 98.1 °F (36.7 °C) (Oral)   Resp 18   Ht 5' 1\" (1.549 m)   Wt 112 lb 12.8 oz (51.2 kg)   SpO2 98%   BMI 21.31 kg/m²  on 2 L  Constitutional:  No acute distress   HEENT: no scleral icterus  Neck: No tracheal deviation present. Cardiovascular: Normal heart sounds. Pulmonary/Chest: No wheezes. No rhonchi. No rales. + decreased breath sounds. No accessory muscle usage or stridor. Abdominal: Soft. Musculoskeletal: No cyanosis. No clubbing. Skin: Skin is warm and dry.      Scheduled Meds:   magnesium sulfate  2,000 mg IntraVENous Once    predniSONE  40 mg Oral Daily    atorvastatin  20 mg Oral Nightly    busPIRone  10 mg Oral TID    [Held by provider] clopidogrel  75 mg Oral Daily    ferrous sulfate  325 mg Oral Daily with breakfast    magnesium oxide  400 mg Oral Daily    metoprolol tartrate  25 mg Oral BID    pantoprazole  20 mg Oral Daily    polyethylene glycol  17 g Oral BID    sertraline  50 mg Oral Daily    [Held by provider] enoxaparin  40 mg SubCUTAneous Daily    ipratropium-albuterol  1 ampule Inhalation Q4H WA    sodium chloride flush  10 mL IntraVENous 2 times per day     Continuous Infusions:   sodium chloride 25 mL (10/20/21 0909)    dextrose       PRN Meds:  melatonin, ondansetron **OR** ondansetron, polyethylene glycol, acetaminophen **OR** acetaminophen, albuterol, sodium chloride flush, sodium chloride, glucose, dextrose, glucagon (rDNA), dextrose    Labs:  CBC:   Recent Labs     10/18/21  0417 10/18/21  0417 10/19/21  0743 10/19/21  0743 10/19/21  0916 10/19/21  1742 10/20/21  0522   WBC 6.3  --  7.0  --   --   --  6.7   HGB 7.5*   < > 6.7*   < > 7.6* 7.9* 6.7*   HCT 24.3*   < > 21.4*   < > 24.1* 24.6* 20.9*   MCV 94.3  --  90.0  --   --   --  92.7     --  232  --   -- --  228    < > = values in this interval not displayed. BMP:   Recent Labs     10/18/21  0417 10/19/21  0744 10/20/21  0522   * 142 145   K 3.9 3.8 3.3*    106 105   CO2 30 29 32   BUN 15 19 24*   CREATININE 0.8 0.9 0.9     Cultures:  9/16/2021 SARS-CoV-2  negative  9/16/2021 influenza A & B negative  10/17/2021 Blood cultures negative    Imaging:  CXR 10/17/2021 patchy right airspace disease    ACT 10/19/21  No significant acute abdominal abnormality.  No retroperitoneal bleed.  Very   small amount of free fluid in the right pelvis of uncertain etiology and   significance.  Cholelithiasis without finding of acute cholecystitis.  Large   left and moderate right pleural effusions with bibasilar atelectasis.  Mild   anasarca. RECOMMENDATIONS:   If the patient is short of breath, ultrasound-guided thoracentesis would be   recommended for its diagnostic and therapeutic benefits. ASSESSMENT:  · Acute on chronic respiratory failure with hypoxemia and hypercapnia, baseline requirement 2.5 L O2, resolved  · Was just admitted 10/12/2021-10/14/2021 with respiratory failure, intubated during that admission  · ED 9/28/21 with respiratory failure  · Admitted 9/23/2021-9/27/2021  · Admitted 9/15/2021-9/23/2021  · Admitted 9/9/2021-9/14/2021  · Bilateral pleural effusions, L>R  · Acute COPD exacerbation - probably not worsened leading to this admission  · Healthcare acquired pneumonia - no suggestive symptoms, but Xray with new finding  · Anemia  · Seizure like activity  · CAD  · Chronic anxiety with panic attacks  · S/P COVID-19 vaccination    PLAN:  · BiPAP HS and PRN; was on 16/8 at [facility], previously on AVAPS with home Trilogy.  16/5 is current setting  · Plavix held 10/19/21 for thoracentesis, okay to perform outpt  · Lasix per Internal Medicine   · Prednisone 40 qd, taper  · Seizure precautions, monitor  · I d/w Dr. Becky Leos

## 2021-10-20 NOTE — FLOWSHEET NOTE
AM assessment completed. See flowsheet. A/Ox4. Lung sounds clear and diminished in all fields. Patient reports occasional non-productive cough. Medications taken without difficulty. BS active x4. No c/o n/v/d. Cont of B&B. +2 pitting edema noted to BLE. No c/o pain/discomfort at this time. Bed locked and in low position. Bed alarm on. Call light in reach.      10/20/21 0833   Vital Signs   Temp 98.1 °F (36.7 °C)   Temp Source Oral   Pulse 75   Heart Rate Source Monitor   Resp 18   /69   BP Location Left upper arm   Patient Position Semi fowlers   Level of Consciousness Alert (0)   MEWS Score 1   Patient Currently in Pain Denies   Pain Assessment   Pain Assessment 0-10   Pain Level 0   Oxygen Therapy   SpO2 97 %   O2 Device High flow nasal cannula   O2 Flow Rate (L/min) 2 L/min

## 2021-10-20 NOTE — PROGRESS NOTES
10/20/21 0038   NIV Type   Skin Protection for O2 Device Yes   NIV Started/Stopped On   Equipment Type V60   Mode Bilevel   Mask Type Full face mask   Mask Size Small   Settings/Measurements   IPAP 16 cmH20   CPAP/EPAP 8 cmH2O   Rate Ordered 16   Resp 24   FiO2  35 %   Vt Exhaled 483 mL   Mask Leak (lpm) 0 lpm   Comfort Level Good   Using Accessory Muscles No   SpO2 98   Alarm Settings   Alarms On Y

## 2021-10-21 LAB
A/G RATIO: 1.5 (ref 1.1–2.2)
ALBUMIN SERPL-MCNC: 3.2 G/DL (ref 3.4–5)
ALP BLD-CCNC: 61 U/L (ref 40–129)
ALT SERPL-CCNC: 10 U/L (ref 10–40)
ANION GAP SERPL CALCULATED.3IONS-SCNC: 8 MMOL/L (ref 3–16)
AST SERPL-CCNC: 12 U/L (ref 15–37)
BASOPHILS ABSOLUTE: 0 K/UL (ref 0–0.2)
BASOPHILS RELATIVE PERCENT: 0.1 %
BILIRUB SERPL-MCNC: 0.9 MG/DL (ref 0–1)
BUN BLDV-MCNC: 20 MG/DL (ref 7–20)
CALCIUM SERPL-MCNC: 8.9 MG/DL (ref 8.3–10.6)
CHLORIDE BLD-SCNC: 102 MMOL/L (ref 99–110)
CO2: 32 MMOL/L (ref 21–32)
CREAT SERPL-MCNC: 0.7 MG/DL (ref 0.6–1.2)
EOSINOPHILS ABSOLUTE: 0 K/UL (ref 0–0.6)
EOSINOPHILS RELATIVE PERCENT: 0.3 %
GFR AFRICAN AMERICAN: >60
GFR NON-AFRICAN AMERICAN: >60
GLOBULIN: 2.2 G/DL
GLUCOSE BLD-MCNC: 119 MG/DL (ref 70–99)
GLUCOSE BLD-MCNC: 88 MG/DL (ref 70–99)
HCT VFR BLD CALC: 27.8 % (ref 36–48)
HCT VFR BLD CALC: 28.6 % (ref 36–48)
HEMOGLOBIN: 9.1 G/DL (ref 12–16)
HEMOGLOBIN: 9.4 G/DL (ref 12–16)
LYMPHOCYTES ABSOLUTE: 1.6 K/UL (ref 1–5.1)
LYMPHOCYTES RELATIVE PERCENT: 19 %
MAGNESIUM: 1.9 MG/DL (ref 1.8–2.4)
MCH RBC QN AUTO: 29.5 PG (ref 26–34)
MCHC RBC AUTO-ENTMCNC: 32.5 G/DL (ref 31–36)
MCV RBC AUTO: 90.6 FL (ref 80–100)
MONOCYTES ABSOLUTE: 0.4 K/UL (ref 0–1.3)
MONOCYTES RELATIVE PERCENT: 4.6 %
NEUTROPHILS ABSOLUTE: 6.2 K/UL (ref 1.7–7.7)
NEUTROPHILS RELATIVE PERCENT: 76 %
PDW BLD-RTO: 20.8 % (ref 12.4–15.4)
PERFORMED ON: ABNORMAL
PLATELET # BLD: 243 K/UL (ref 135–450)
PMV BLD AUTO: 7.7 FL (ref 5–10.5)
POTASSIUM REFLEX MAGNESIUM: 3.7 MMOL/L (ref 3.5–5.1)
RBC # BLD: 3.07 M/UL (ref 4–5.2)
SODIUM BLD-SCNC: 142 MMOL/L (ref 136–145)
TOTAL PROTEIN: 5.4 G/DL (ref 6.4–8.2)
WBC # BLD: 8.2 K/UL (ref 4–11)

## 2021-10-21 PROCEDURE — 99232 SBSQ HOSP IP/OBS MODERATE 35: CPT | Performed by: INTERNAL MEDICINE

## 2021-10-21 PROCEDURE — 80053 COMPREHEN METABOLIC PANEL: CPT

## 2021-10-21 PROCEDURE — 94761 N-INVAS EAR/PLS OXIMETRY MLT: CPT

## 2021-10-21 PROCEDURE — 85025 COMPLETE CBC W/AUTO DIFF WBC: CPT

## 2021-10-21 PROCEDURE — 2700000000 HC OXYGEN THERAPY PER DAY

## 2021-10-21 PROCEDURE — 85014 HEMATOCRIT: CPT

## 2021-10-21 PROCEDURE — 83735 ASSAY OF MAGNESIUM: CPT

## 2021-10-21 PROCEDURE — 6370000000 HC RX 637 (ALT 250 FOR IP): Performed by: INTERNAL MEDICINE

## 2021-10-21 PROCEDURE — 2580000003 HC RX 258: Performed by: INTERNAL MEDICINE

## 2021-10-21 PROCEDURE — 94660 CPAP INITIATION&MGMT: CPT

## 2021-10-21 PROCEDURE — 94640 AIRWAY INHALATION TREATMENT: CPT

## 2021-10-21 PROCEDURE — 2060000000 HC ICU INTERMEDIATE R&B

## 2021-10-21 PROCEDURE — 36415 COLL VENOUS BLD VENIPUNCTURE: CPT

## 2021-10-21 PROCEDURE — 6370000000 HC RX 637 (ALT 250 FOR IP): Performed by: PHYSICIAN ASSISTANT

## 2021-10-21 PROCEDURE — 85018 HEMOGLOBIN: CPT

## 2021-10-21 RX ORDER — FUROSEMIDE 20 MG/1
20 TABLET ORAL SEE ADMIN INSTRUCTIONS
Status: DISCONTINUED | OUTPATIENT
Start: 2021-10-21 | End: 2021-10-26 | Stop reason: HOSPADM

## 2021-10-21 RX ORDER — POLYETHYLENE GLYCOL 3350 17 G/17G
119 POWDER, FOR SOLUTION ORAL ONCE
Status: COMPLETED | OUTPATIENT
Start: 2021-10-22 | End: 2021-10-22

## 2021-10-21 RX ORDER — POLYETHYLENE GLYCOL 3350 17 G/17G
119 POWDER, FOR SOLUTION ORAL ONCE
Status: COMPLETED | OUTPATIENT
Start: 2021-10-21 | End: 2021-10-21

## 2021-10-21 RX ADMIN — BUSPIRONE HYDROCHLORIDE 10 MG: 10 TABLET ORAL at 13:50

## 2021-10-21 RX ADMIN — SERTRALINE HYDROCHLORIDE 50 MG: 50 TABLET ORAL at 08:40

## 2021-10-21 RX ADMIN — FERROUS SULFATE TAB 325 MG (65 MG ELEMENTAL FE) 325 MG: 325 (65 FE) TAB at 08:40

## 2021-10-21 RX ADMIN — PANTOPRAZOLE SODIUM 20 MG: 20 TABLET, DELAYED RELEASE ORAL at 08:40

## 2021-10-21 RX ADMIN — SODIUM CHLORIDE, PRESERVATIVE FREE 10 ML: 5 INJECTION INTRAVENOUS at 08:41

## 2021-10-21 RX ADMIN — PREDNISONE 40 MG: 20 TABLET ORAL at 08:39

## 2021-10-21 RX ADMIN — IPRATROPIUM BROMIDE AND ALBUTEROL SULFATE 1 AMPULE: .5; 2.5 SOLUTION RESPIRATORY (INHALATION) at 23:19

## 2021-10-21 RX ADMIN — METOPROLOL TARTRATE 25 MG: 25 TABLET, FILM COATED ORAL at 08:40

## 2021-10-21 RX ADMIN — POLYETHYLENE GLYCOL 3350 119 G: 17 POWDER, FOR SOLUTION ORAL at 18:09

## 2021-10-21 RX ADMIN — BUSPIRONE HYDROCHLORIDE 10 MG: 10 TABLET ORAL at 08:40

## 2021-10-21 RX ADMIN — POLYETHYLENE GLYCOL (3350) 17 G: 17 POWDER, FOR SOLUTION ORAL at 20:44

## 2021-10-21 RX ADMIN — ATORVASTATIN CALCIUM 20 MG: 10 TABLET, FILM COATED ORAL at 20:43

## 2021-10-21 RX ADMIN — IPRATROPIUM BROMIDE AND ALBUTEROL SULFATE 1 AMPULE: .5; 2.5 SOLUTION RESPIRATORY (INHALATION) at 07:00

## 2021-10-21 RX ADMIN — BUSPIRONE HYDROCHLORIDE 10 MG: 10 TABLET ORAL at 20:43

## 2021-10-21 RX ADMIN — MAGNESIUM GLUCONATE 500 MG ORAL TABLET 400 MG: 500 TABLET ORAL at 08:40

## 2021-10-21 RX ADMIN — POLYETHYLENE GLYCOL (3350) 17 G: 17 POWDER, FOR SOLUTION ORAL at 08:41

## 2021-10-21 RX ADMIN — SODIUM CHLORIDE, PRESERVATIVE FREE 10 ML: 5 INJECTION INTRAVENOUS at 20:44

## 2021-10-21 RX ADMIN — IPRATROPIUM BROMIDE AND ALBUTEROL SULFATE 1 AMPULE: .5; 2.5 SOLUTION RESPIRATORY (INHALATION) at 10:59

## 2021-10-21 RX ADMIN — BISACODYL 20 MG: 5 TABLET, COATED ORAL at 11:07

## 2021-10-21 RX ADMIN — IPRATROPIUM BROMIDE AND ALBUTEROL SULFATE 1 AMPULE: .5; 2.5 SOLUTION RESPIRATORY (INHALATION) at 14:31

## 2021-10-21 RX ADMIN — METOPROLOL TARTRATE 25 MG: 25 TABLET, FILM COATED ORAL at 20:44

## 2021-10-21 RX ADMIN — IPRATROPIUM BROMIDE AND ALBUTEROL SULFATE 1 AMPULE: .5; 2.5 SOLUTION RESPIRATORY (INHALATION) at 20:32

## 2021-10-21 ASSESSMENT — PAIN SCALES - GENERAL
PAINLEVEL_OUTOF10: 0

## 2021-10-21 NOTE — PROGRESS NOTES
Report given to Trena Joyec RN at patient bedside. Pt is stable, call light in reach, with no needs at this time. Care transferred at this time.      Melanie Gibbons RN

## 2021-10-21 NOTE — PROGRESS NOTES
This RN removed dressing soaked old dressing with saline stuck to wound , irrigated wound , applied xeroform, 4x4 , and rolled gauze,  pt tolerated well

## 2021-10-21 NOTE — PROGRESS NOTES
Pulmonary Progress Note  CC: Acute Hypercarbic Respiratory Failure     Subjective:    Less shortness of breath     IV line peripheral     EXAM:   BP (!) 142/84   Pulse 81   Temp 98 °F (36.7 °C) (Oral)   Resp 16   Ht 5' 1\" (1.549 m)   Wt 112 lb 12.8 oz (51.2 kg)   SpO2 99%   BMI 21.31 kg/m²  on 2 L  Constitutional:  No acute distress. Chronically ill appearing. HEENT: No scleral icterus  Neck: No tracheal deviation present. Cardiovascular: Normal heart sounds. No peripheral edema. Pulmonary/Chest: No wheezes. No rhonchi. No rales. + decreased breath sounds. Normal work of breathing without accessory muscle usage or stridor. Abdominal: Soft. Non-tender. Musculoskeletal: No cyanosis. No clubbing. Skin: Skin is warm and dry. Extensive bruising on all extremities.      Scheduled Meds:   predniSONE  40 mg Oral Daily    atorvastatin  20 mg Oral Nightly    busPIRone  10 mg Oral TID    [Held by provider] clopidogrel  75 mg Oral Daily    ferrous sulfate  325 mg Oral Daily with breakfast    magnesium oxide  400 mg Oral Daily    metoprolol tartrate  25 mg Oral BID    pantoprazole  20 mg Oral Daily    polyethylene glycol  17 g Oral BID    sertraline  50 mg Oral Daily    [Held by provider] enoxaparin  40 mg SubCUTAneous Daily    ipratropium-albuterol  1 ampule Inhalation Q4H WA    sodium chloride flush  10 mL IntraVENous 2 times per day     Continuous Infusions:   sodium chloride      sodium chloride Stopped (10/20/21 1340)    dextrose       PRN Meds:  sodium chloride, melatonin, ondansetron **OR** ondansetron, polyethylene glycol, acetaminophen **OR** acetaminophen, albuterol, sodium chloride flush, sodium chloride, glucose, dextrose, glucagon (rDNA), dextrose    Labs:  CBC:   Recent Labs     10/19/21  0743 10/19/21  0916 10/20/21  0522 10/20/21  1757 10/21/21  0426   WBC 7.0  --  6.7  --  8.2   HGB 6.7*   < > 6.7* 10.3* 9.1*   HCT 21.4*   < > 20.9* 32.1* 27.8*   MCV 90.0  --  92.7  --  90.6   --  228  --  243    < > = values in this interval not displayed. BMP:   Recent Labs     10/19/21  0744 10/20/21  0522 10/21/21  0427    145 142   K 3.8 3.3* 3.7    105 102   CO2 29 32 32   BUN 19 24* 20   CREATININE 0.9 0.9 0.7     Cultures:  9/16/2021 SARS-CoV-2  negative  9/16/2021 influenza A & B negative  10/17/2021 Blood cultures negative    Imaging:  CXR 10/17/2021 patchy right airspace disease    ACT 10/19/21  No significant acute abdominal abnormality.  No retroperitoneal bleed.  Very   small amount of free fluid in the right pelvis of uncertain etiology and   significance.  Cholelithiasis without finding of acute cholecystitis.  Large   left and moderate right pleural effusions with bibasilar atelectasis.  Mild   anasarca. RECOMMENDATIONS:   If the patient is short of breath, ultrasound-guided thoracentesis would be   recommended for its diagnostic and therapeutic benefits. ASSESSMENT:  · Acute on chronic respiratory failure with hypoxemia and hypercapnia, baseline requirement 2.5 L O2, resolved  · Was just admitted 10/12/2021-10/14/2021 with respiratory failure, intubated during that admission  · ED 9/28/21 with respiratory failure  · Admitted 9/23/2021-9/27/2021  · Admitted 9/15/2021-9/23/2021  · Admitted 9/9/2021-9/14/2021  · Bilateral pleural effusions, L>R  · Acute COPD exacerbation - probably not worsened leading to this admission  · HCAP - no suggestive symptoms, but Xray with new finding  · Anemia  · Seizure like activity  · CAD  · Chronic anxiety with panic attacks  · S/P COVID-19 vaccination    PLAN:  · BiPAP HS and PRN; was on 16/8 at "[facility] previously on AVAPS with home Trilogy.  16/5 is current setting  · Plavix held 10/19/21 for thoracentesis, okay to perform outpt if patient discharged prior to performing   · GI considering colonoscopy   · Lasix per Internal Medicine   · Prednisone 30 mg, tapering slowly  · Seizure precautions, monitor  · I d/w Dr. Lili Curiel

## 2021-10-21 NOTE — PROGRESS NOTES
Admit: 10/17/2021    Name:  Jodi Elias  Room:  /1088-02  MRN:    [id number]    Daily Progress Note for 10/21/2021     Admitted with acute on chronic respiratory failure with hypoxia and hypercarbia  Multiple admissions over last 2 months    Interval History:     She was on BiPAP last night.   Currently on 2 L of oxygen  Hemoglobin dropped to 6.7--s.p PRBC transfusion     10/21  Pt remains in bed, reports breathing slightly better after lasix  Drop in h.h with no visible bleeding noted  Holding plavix for outpt thoracocentesis  For EGD and colonoscopy in am    Family at bedside     Scheduled Meds:   predniSONE  40 mg Oral Daily    atorvastatin  20 mg Oral Nightly    busPIRone  10 mg Oral TID    [Held by provider] clopidogrel  75 mg Oral Daily    ferrous sulfate  325 mg Oral Daily with breakfast    magnesium oxide  400 mg Oral Daily    metoprolol tartrate  25 mg Oral BID    pantoprazole  20 mg Oral Daily    polyethylene glycol  17 g Oral BID    sertraline  50 mg Oral Daily    [Held by provider] enoxaparin  40 mg SubCUTAneous Daily    ipratropium-albuterol  1 ampule Inhalation Q4H WA    sodium chloride flush  10 mL IntraVENous 2 times per day       Continuous Infusions:   sodium chloride      sodium chloride Stopped (10/20/21 1340)    dextrose         PRN Meds:  sodium chloride, melatonin, ondansetron **OR** ondansetron, polyethylene glycol, acetaminophen **OR** acetaminophen, albuterol, sodium chloride flush, sodium chloride, glucose, dextrose, glucagon (rDNA), dextrose                  Objective:     Temp  Av.9 °F (36.6 °C)  Min: 96.9 °F (36.1 °C)  Max: 99.3 °F (37.4 °C)  Pulse  Av.2  Min: 75  Max: 87  BP  Min: 116/69  Max: 154/89  SpO2  Av.9 %  Min: 96 %  Max: 100 %  FiO2   Av %  Min: 35 %  Max: 35 %  Patient Vitals for the past 4 hrs:   BP Temp Temp src Pulse Resp SpO2   10/21/21 0703     16    10/21/21 0430 (!) 142/84 98 °F (36.7 °C) Oral 81 18 99 % Intake/Output Summary (Last 24 hours) at 10/21/2021 0714  Last data filed at 10/20/2021 2127  Gross per 24 hour   Intake 1730.62 ml   Output 1200 ml   Net 530.62 ml       Physical Exam:  General:  Elderly female up in bed,  Awake, alert and oriented. Appears to be not in any distress  Mucous Membranes:  Pink , anicteric  Neck: No JVD, no carotid bruit, no thyromegaly  Chest:  Improving justa air entry ,no  accessory muscle use  Occasional wheeze   Cardiovascular:  RRR S1S2 heard, no murmurs or gallops  Abdomen:  Soft, undistended, non tender, no organomegaly, BS present  Extremities: scattered ecchymoses, large subcut bleed and edema to left forearm and wrist with resultant finger edema   No edema or cyanosis. Distal pulses well felt  Neurological : grossly normal with gen weakness    Lab Data:  CBC:   Recent Labs     10/19/21  0743 10/19/21  0916 10/20/21  0522 10/20/21  1757 10/21/21  0426   WBC 7.0  --  6.7  --  8.2   RBC 2.38*  --  2.25*  --  3.07*   HGB 6.7*   < > 6.7* 10.3* 9.1*   HCT 21.4*   < > 20.9* 32.1* 27.8*   MCV 90.0  --  92.7  --  90.6   RDW 21.3*  --  21.4*  --  20.8*     --  228  --  243    < > = values in this interval not displayed. BMP:   Recent Labs     10/19/21  0744 10/20/21  0522 10/21/21  0427    145 142   K 3.8 3.3* 3.7    105 102   CO2 29 32 32   BUN 19 24* 20   CREATININE 0.9 0.9 0.7     BNP: No results for input(s): BNP in the last 72 hours. PT/INR:   No results for input(s): PROTIME, INR in the last 72 hours. APTT:No results for input(s): APTT in the last 72 hours. CARDIAC ENZYMES:   No results for input(s): CKMB, CKMBINDEX, TROPONINI in the last 72 hours.     Invalid input(s): CKTOTAL;3  FASTING LIPID PANEL:  Lab Results   Component Value Date    CHOL 146 11/14/2019    HDL 63 11/14/2019    TRIG 110 11/14/2019     LIVER PROFILE:   Recent Labs     10/19/21  0744 10/20/21  0522 10/21/21  0427   AST 12* 10* 12*   ALT 11 10 10   BILITOT 0.4 0.4 0.9   ALKPHOS 61 59 64         CT ABDOMEN PELVIS WO CONTRAST Additional Contrast? None   Final Result   No significant acute abdominal abnormality. No retroperitoneal bleed. Very   small amount of free fluid in the right pelvis of uncertain etiology and   significance. Cholelithiasis without finding of acute cholecystitis. Large   left and moderate right pleural effusions with bibasilar atelectasis. Mild   anasarca. RECOMMENDATIONS:   If the patient is short of breath, ultrasound-guided thoracentesis would be   recommended for its diagnostic and therapeutic benefits. XR CHEST PORTABLE   Final Result   1. Patchy airspace disease, right perihilar region, suggesting pneumonia or   atelectasis   2. Small pleural effusions   3. Improved aeration present within the left lung base, consistent with   resolving atelectasis and or pneumonia   4. Underlying interstitial pulmonary fibrosis               Assessment & Plan:         Acute on chronic hypoxic and hypercarbic respiratory failure. -Secondary to acute COPD exacerbation possible healthcare associated pneumonia. -BiPAP at night. Currently on 2 L of oxygen.  -Continue prednisone 40 mg daily.  -was given  cefepime and vancomycin day #4. And doxycycline day #3 , however pt has been on multiple rounds of abx recently and no clear evidence of pna this time   Wbc stable. Will stop abx   - left pleural effusion - on going diuresis, planned for thoracocentesis soon  - pulmonary consulted.       Acute on chronic anemia  -Her hemoglobin is 8.1 on 10/13/2021  10.3--> 7.5--> 7.6 -->6.7-->7.6-->7.9--6.7  - give 1 unit PRBC today and per pulmonary recommendations, give Lasix when starting blood  -Recent iron studies shows anemia of iron deficiency and chronic disease  -CT abd/pelvis   -Hemoccult stools - ordered not collected  -She had a colonoscopy 2/2020-showed hemorrhoids-Dr. Zach Benítez  -Holding Plavix for now  - GI consulted  -- GI consulted, recommend possible colonoscopy on Friday     Hypokalemia   - 3.3, replacement ordered      Hypomagnesemia  Replaced     #CAD  #HTN  - cont lopressor, statin  Hold plavix 2 to drop in H/H     #GERD  - cont PPI     #Severe malnutrition  #Generalized weakness and debility        DVT Prophylaxis: scds  Diet: general  Code Status: Full Code     Jody Meraz MD,

## 2021-10-21 NOTE — PROGRESS NOTES
10/21/21 0703   NIV Type   Skin Assessment Clean, dry, & intact   Skin Protection for O2 Device Yes   Orientation Middle   Location Nose   Equipment Type v60   Mode Bilevel   Mask Type Full face mask   Mask Size Small   Settings/Measurements   IPAP 16 cmH20   CPAP/EPAP 8 cmH2O   Rate Ordered 16   Resp 16   FiO2  35 %   Vt Exhaled 331 mL   Minute Volume 13.1 Liters   Mask Leak (lpm) 0 lpm   Comfort Level Good   Using Accessory Muscles No   SpO2 100   Breath Sounds   Right Upper Lobe Diminished;Clear   Right Middle Lobe Diminished;Clear   Right Lower Lobe Diminished;Clear   Left Upper Lobe Diminished;Clear   Left Lower Lobe Diminished;Clear   Alarm Settings   Alarms On Y   Press Low Alarm 5 cmH2O   High Pressure Alarm 30 cmH2O   Delay Alarm 20 sec(s)   Resp Rate Low Alarm 16   High Respiratory Rate 40 br/min

## 2021-10-21 NOTE — PROGRESS NOTES
Assessment completed, see flowsheets. Night meds given. Pt refusing glycolax, after receiving ducolax earlier today. Pt with no reports of pain at this time. Bed/chair alarm on, bed in lowest position with call light in reach.     Melanie Gibbons RN

## 2021-10-21 NOTE — PROGRESS NOTES
Progress Note    Admit Date:  10/17/2021    Admitted with acute on chronic respiratory failure with hypoxia and hypercarbia  Multiple admissions over last 2 months    Subjective:    10/19  She was on BiPAP last night. Currently on 2 L of oxygen  Hemoglobin dropped to 6.7--     10/20  Pt remains in bed, reports breathing slightly better after lasix  Drop in h.h with no visible bleeding noted  Holding plavix for outpt thoracocentesis     Family at bedside     10/21  Ms. Smith stated she is feeling about the same. On 2 Liters of oxygen. +occult blood, GI planning for colonoscopy on Friday. Holding Plavix for thoracentesis as well as drop in H/H    Objective:   Patient Vitals for the past 4 hrs:   BP Temp Temp src Pulse Resp SpO2   10/21/21 0830 134/75 98 °F (36.7 °C) Oral 75 16 96 %   10/21/21 0703     16             Intake/Output Summary (Last 24 hours) at 10/21/2021 0914  Last data filed at 10/21/2021 0825  Gross per 24 hour   Intake 1750.62 ml   Output 1200 ml   Net 550.62 ml       Physical Exam:    Gen: No distress. Alert. Appears chronically frail and chronically ill  Eyes: PERRL. No sclera icterus. No conjunctival injection. ENT: No discharge. Pharynx clear. Neck: No JVD. Trachea midline. Resp: No accessory muscle use. Diminished throughout. No crackles. No wheezes. No rhonchi. CV: Regular rate. Regular rhythm. No murmur. No rub. No edema. Capillary Refill: Brisk,< 3 seconds   Peripheral Pulses: +2 palpable, equal bilaterally   GI: Non-tender. Non-distended. Normal bowel sounds. Skin: Warm and dry. No nodule on exposed extremities. No rash on exposed extremities. Scattered ecchymosis on BUE and BLE Left wrist wrapped with kerlex  M/S: No cyanosis. No joint deformity. No clubbing. Neuro: Awake.  Grossly nonfocal    Psych: Oriented x 3. +anxiety      Scheduled Meds:   predniSONE  40 mg Oral Daily    atorvastatin  20 mg Oral Nightly    busPIRone  10 mg Oral TID    [Held by provider] clopidogrel  75 mg Oral Daily    ferrous sulfate  325 mg Oral Daily with breakfast    magnesium oxide  400 mg Oral Daily    metoprolol tartrate  25 mg Oral BID    pantoprazole  20 mg Oral Daily    polyethylene glycol  17 g Oral BID    sertraline  50 mg Oral Daily    [Held by provider] enoxaparin  40 mg SubCUTAneous Daily    ipratropium-albuterol  1 ampule Inhalation Q4H WA    sodium chloride flush  10 mL IntraVENous 2 times per day       Continuous Infusions:   sodium chloride      sodium chloride Stopped (10/20/21 1340)    dextrose         PRN Meds:  sodium chloride, melatonin, ondansetron **OR** ondansetron, polyethylene glycol, acetaminophen **OR** acetaminophen, albuterol, sodium chloride flush, sodium chloride, glucose, dextrose, glucagon (rDNA), dextrose      Data:  CBC:   Recent Labs     10/19/21  0743 10/19/21  0916 10/20/21  0522 10/20/21  1757 10/21/21  0426   WBC 7.0  --  6.7  --  8.2   HGB 6.7*   < > 6.7* 10.3* 9.1*   HCT 21.4*   < > 20.9* 32.1* 27.8*   MCV 90.0  --  92.7  --  90.6     --  228  --  243    < > = values in this interval not displayed. BMP:   Recent Labs     10/19/21  0744 10/20/21  0522 10/21/21  0427    145 142   K 3.8 3.3* 3.7    105 102   CO2 29 32 32   BUN 19 24* 20   CREATININE 0.9 0.9 0.7     LIVER PROFILE:   Recent Labs     10/19/21  0744 10/20/21  0522 10/21/21  0427   AST 12* 10* 12*   ALT 11 10 10   BILITOT 0.4 0.4 0.9   ALKPHOS 60 59 61     PT/INR: No results for input(s): PROTIME, INR in the last 72 hours. CULTURES  COVID: negative   Blood cx x2: NGTD     RADIOLOGY  CT ABDOMEN PELVIS WO CONTRAST Additional Contrast? None   Final Result   No significant acute abdominal abnormality. No retroperitoneal bleed. Very   small amount of free fluid in the right pelvis of uncertain etiology and   significance. Cholelithiasis without finding of acute cholecystitis. Large   left and moderate right pleural effusions with bibasilar atelectasis.   Mild anasarca. RECOMMENDATIONS:   If the patient is short of breath, ultrasound-guided thoracentesis would be   recommended for its diagnostic and therapeutic benefits. XR CHEST PORTABLE   Final Result   1. Patchy airspace disease, right perihilar region, suggesting pneumonia or   atelectasis   2. Small pleural effusions   3. Improved aeration present within the left lung base, consistent with   resolving atelectasis and or pneumonia   4. Underlying interstitial pulmonary fibrosis               Assessment/Plan:    Acute on chronic hypoxic and hypercarbic respiratory failure. -Secondary to acute COPD exacerbation possible healthcare associated pneumonia. -BiPAP at night. Currently on 2 L of oxygen. previously on AVAPS with home Trilogy.   -Continue prednisone 40 mg daily.  -was given  cefepime and vancomycin day #4. And doxycycline day #3 , however pt has been on multiple rounds of abx recently and no clear evidence of pna this time   - Wbc stable. Will stop abx   - left pleural effusion - on going diuresis, planned for thoracocentesis soon D#2 holding Plavix   - nursing to verify home dose of Lasix, plan to resume PO today- patient only takes on Tuesday and Fridays at home. Will reorder   - pulmonary consulted.        Acute on chronic anemia  -Her hemoglobin is 8.1 on 10/13/2021  10.3--> 7.5--> 7.6 -->6.7-->7.6-->7.9--6.7  - give 1 unit PRBC on 10/20--> 9.1 this am  -Recent iron studies shows anemia of iron deficiency and chronic disease  -CT abd/pelvis   -Hemoccult stools - positive   -She had a colonoscopy 2/2020-showed hemorrhoids-Dr. Gal Rojas  -Holding Plavix and Lovenox  for now  - GI consulted, recommend possible colonoscopy on Friday      Hypokalemia -resolved  - 3.3, replacement ordered  - monitor, repeat in am --3.7     Hypomagnesemia  - 1.4, 2 gm replacement ordered  -monitor  - repeat ordered        #CAD  #HTN  - cont lopressor, statin  - holding plavix 2 to drop in H/H     #GERD  - cont PPI #Severe malnutrition  #Generalized weakness and debility      DVT Prophylaxis: scds  Diet: ADULT DIET;  Clear Liquid  ADULT ORAL NUTRITION SUPPLEMENT; Breakfast, Dinner; Clear Liquid Oral Supplement  Code Status: Full Code      CLIFTON Le - CNP

## 2021-10-21 NOTE — PLAN OF CARE
Problem: Skin Integrity:  Goal: Will show no infection signs and symptoms  Description: Will show no infection signs and symptoms  10/21/2021 0756 by Arline Fraire RN  Outcome: Ongoing  10/21/2021 0201 by Iraida Benítez RN  Outcome: Met This Shift  Goal: Absence of new skin breakdown  Description: Absence of new skin breakdown  10/21/2021 0756 by Arline Fraire RN  Outcome: Ongoing  10/21/2021 0201 by Iraida Benítez RN  Outcome: Met This Shift     Problem: Falls - Risk of:  Goal: Will remain free from falls  Description: Will remain free from falls  10/21/2021 0756 by Arline Fraire RN  Outcome: Ongoing  10/21/2021 0201 by Iraida Benítez RN  Outcome: Met This Shift  Goal: Absence of physical injury  Description: Absence of physical injury  10/21/2021 0756 by Arline Fraire RN  Outcome: Ongoing  10/21/2021 0201 by Iraida Benítez RN  Outcome: Met This Shift     Problem: Pain:  Goal: Pain level will decrease  Description: Pain level will decrease  10/21/2021 0756 by Arline Fraire RN  Outcome: Ongoing  10/21/2021 0201 by Iraida Benítez RN  Outcome: Ongoing  Goal: Control of acute pain  Description: Control of acute pain  10/21/2021 0756 by Arline Fraire RN  Outcome: Ongoing  10/21/2021 0201 by Iraida Benítez RN  Outcome: Ongoing  Goal: Control of chronic pain  Description: Control of chronic pain  10/21/2021 0756 by rAline Fraire RN  Outcome: Ongoing  10/21/2021 0201 by Iraida Benítez RN  Outcome: Ongoing

## 2021-10-21 NOTE — PROGRESS NOTES
PROGRESS NOTE  S:81 yrs Patient  admitted on 10/17/2021 with Acute on chronic respiratory failure with hypoxia and hypercapnia (HCC) [J96.21, J96.22] . Today she complains of nausea following breakfast this AM. She passed 3-4x BMs over the past 24 hours. Exam:   Vitals:    10/21/21 1059   BP:    Pulse:    Resp: 16   Temp:    SpO2: 100%      General appearance: alert, appears older than stated age, cooperative and syndromic appearance - chronically ill appearing  HEENT: Neck supple with midline trachea  Neck: no adenopathy and supple, symmetrical, trachea midline  Lungs: clear to auscultation bilaterally  Heart: regular rate and rhythm, S1, S2 normal, no murmur, click, rub or gallop  Abdomen: soft, non-tender; bowel sounds normal; no masses,  no organomegaly  Extremities: extremities normal, atraumatic, no cyanosis or edema     Medications: Reviewed    Labs:  CBC:   Recent Labs     10/19/21  0743 10/19/21  0916 10/20/21  0522 10/20/21  1757 10/21/21  0426   WBC 7.0  --  6.7  --  8.2   HGB 6.7*   < > 6.7* 10.3* 9.1*   HCT 21.4*   < > 20.9* 32.1* 27.8*   MCV 90.0  --  92.7  --  90.6     --  228  --  243    < > = values in this interval not displayed. BMP:   Recent Labs     10/19/21  0744 10/20/21  0522 10/21/21  0427    145 142   K 3.8 3.3* 3.7    105 102   CO2 29 32 32   BUN 19 24* 20   CREATININE 0.9 0.9 0.7     LIVER PROFILE:   Recent Labs     10/19/21  0744 10/20/21  0522 10/21/21  0427   AST 12* 10* 12*   ALT 11 10 10   PROT 5.0* 5.2* 5.4*   BILITOT 0.4 0.4 0.9   ALKPHOS 60 59 61     PT/INR: No results for input(s): INR in the last 72 hours. Invalid input(s): PT      IMAGING:  CT ABDOMEN PELVIS WO CONTRAST Additional Contrast? None   Final Result   No significant acute abdominal abnormality. No retroperitoneal bleed. Very   small amount of free fluid in the right pelvis of uncertain etiology and   significance.   Cholelithiasis without finding of acute cholecystitis. Large   left and moderate right pleural effusions with bibasilar atelectasis. Mild   anasarca. RECOMMENDATIONS:   If the patient is short of breath, ultrasound-guided thoracentesis would be   recommended for its diagnostic and therapeutic benefits. XR CHEST PORTABLE   Final Result   1. Patchy airspace disease, right perihilar region, suggesting pneumonia or   atelectasis   2. Small pleural effusions   3. Improved aeration present within the left lung base, consistent with   resolving atelectasis and or pneumonia   4. Underlying interstitial pulmonary fibrosis           Attending Supervising [de-identified] Attestation Statement  The patient is a [de-identified] y.o. female. I have performed a history and physical examination of the patient. I discussed the case with my physician assistant Mima Maher PA-C    I reviewed the patient's Past Medical History, Past Surgical History, Medications, and Allergies. Physical Exam:  Vitals:    10/21/21 0830 10/21/21 1059 10/21/21 1430 10/21/21 1433   BP: 134/75  128/75    Pulse: 75  75    Resp: 16 16 16 18   Temp: 98 °F (36.7 °C)  98 °F (36.7 °C)    TempSrc: Oral  Oral    SpO2: 96% 100% 100% 98%   Weight:       Height:           Physical Examination: General appearance - alert, well appearing, and in no distress  Mental status - alert, oriented to person, place, and time  Eyes - pupils equal and reactive, extraocular eye movements intact  Neck - supple, no significant adenopathy  Chest - clear to auscultation, no wheezes, rales or rhonchi, symmetric air entry  Heart - normal rate, regular rhythm, normal S1, S2, no murmurs, rubs, clicks or gallops  Abdomen - soft, nontender, nondistended, no masses or organomegaly  Extremities - no pedal edema noted            Impression: [de-identified]year old female with a history of HTN, HLD, COPD, NADJA, asthma, anxiety, GERD, and OA admitted with acute respiratory failure.   Iron deficiency anemia is likely due to anemia of chronic disease but cannot exclude slow GI blood loss. Recommendation:  1. Continue supportive care  2. Monitor Hgb  3. Observe for signs of bleeding  4. Monitor and document output  5. Continue PO iron supplementation  6. Continue Pantoprazole qAM  7. Continue bowel regimen with Miralax BID and Dulcolax qd  8. Begin bowel prep for colonoscopy tomorrow  9. Clear liquid diet as tolerated  10. NPO after 5:00 AM tomorrow  11. Encourage nutrition  12. Pulmonology following - planning thoracentesis as outpatient  13. Will follow       Graylin Moritz, PA-C  11:32 AM 10/21/2021                      [de-identified]year old female with a history of HTN, HLD, COPD, NADJA, asthma, anxiety, GERD, and OA admitted with acute respiratory failure. Iron deficiency anemia is likely due to anemia of chronic disease but cannot exclude slow gi blood loss     Continue supportive care. Monitor Hgb. Observe for signs of bleeding. PPI daily.  Colonoscopy tomorrow       Johny Becerra MD          (O) [phone]

## 2021-10-21 NOTE — FLOWSHEET NOTE
10/20/21 5115   Stool Assessment   Incontinence No   Stool Appearance Loose; Watery   Stool Color Black; Brown   Stool Amount Medium   Stool Source Rectum   Amanda care provided with complete linen change. Pt positioned for comfort. No other needs at this time. Call light and bed alarm use reviewed with verbal understanding received and call light in reach, bed alarm activated.   Sumi Love Clinical

## 2021-10-22 ENCOUNTER — ANESTHESIA (OUTPATIENT)
Dept: ENDOSCOPY | Age: 81
DRG: 871 | End: 2021-10-22
Payer: MEDICARE

## 2021-10-22 ENCOUNTER — ANESTHESIA EVENT (OUTPATIENT)
Dept: ENDOSCOPY | Age: 81
DRG: 871 | End: 2021-10-22
Payer: MEDICARE

## 2021-10-22 VITALS
RESPIRATION RATE: 19 BRPM | OXYGEN SATURATION: 100 % | TEMPERATURE: 98.6 F | DIASTOLIC BLOOD PRESSURE: 42 MMHG | SYSTOLIC BLOOD PRESSURE: 111 MMHG

## 2021-10-22 LAB
A/G RATIO: 1.5 (ref 1.1–2.2)
ALBUMIN SERPL-MCNC: 3.2 G/DL (ref 3.4–5)
ALP BLD-CCNC: 60 U/L (ref 40–129)
ALT SERPL-CCNC: 8 U/L (ref 10–40)
ANION GAP SERPL CALCULATED.3IONS-SCNC: 7 MMOL/L (ref 3–16)
AST SERPL-CCNC: 13 U/L (ref 15–37)
BASOPHILS ABSOLUTE: 0 K/UL (ref 0–0.2)
BASOPHILS RELATIVE PERCENT: 0.1 %
BILIRUB SERPL-MCNC: 0.5 MG/DL (ref 0–1)
BLOOD CULTURE, ROUTINE: NORMAL
BUN BLDV-MCNC: 15 MG/DL (ref 7–20)
CALCIUM SERPL-MCNC: 8.9 MG/DL (ref 8.3–10.6)
CHLORIDE BLD-SCNC: 102 MMOL/L (ref 99–110)
CO2: 30 MMOL/L (ref 21–32)
CREAT SERPL-MCNC: 0.7 MG/DL (ref 0.6–1.2)
CULTURE, BLOOD 2: NORMAL
EOSINOPHILS ABSOLUTE: 0 K/UL (ref 0–0.6)
EOSINOPHILS RELATIVE PERCENT: 0.3 %
GFR AFRICAN AMERICAN: >60
GFR NON-AFRICAN AMERICAN: >60
GLOBULIN: 2.2 G/DL
GLUCOSE BLD-MCNC: 78 MG/DL (ref 70–99)
HCT VFR BLD CALC: 28.9 % (ref 36–48)
HCT VFR BLD CALC: 32.4 % (ref 36–48)
HEMOGLOBIN: 10.6 G/DL (ref 12–16)
HEMOGLOBIN: 9.3 G/DL (ref 12–16)
LYMPHOCYTES ABSOLUTE: 1.6 K/UL (ref 1–5.1)
LYMPHOCYTES RELATIVE PERCENT: 21.1 %
MAGNESIUM: 1.8 MG/DL (ref 1.8–2.4)
MCH RBC QN AUTO: 29.6 PG (ref 26–34)
MCHC RBC AUTO-ENTMCNC: 32.3 G/DL (ref 31–36)
MCV RBC AUTO: 91.6 FL (ref 80–100)
MONOCYTES ABSOLUTE: 0.4 K/UL (ref 0–1.3)
MONOCYTES RELATIVE PERCENT: 5.9 %
NEUTROPHILS ABSOLUTE: 5.3 K/UL (ref 1.7–7.7)
NEUTROPHILS RELATIVE PERCENT: 72.6 %
PDW BLD-RTO: 20.4 % (ref 12.4–15.4)
PLATELET # BLD: 242 K/UL (ref 135–450)
PMV BLD AUTO: 7.8 FL (ref 5–10.5)
POTASSIUM REFLEX MAGNESIUM: 3.5 MMOL/L (ref 3.5–5.1)
RBC # BLD: 3.15 M/UL (ref 4–5.2)
SODIUM BLD-SCNC: 139 MMOL/L (ref 136–145)
TOTAL PROTEIN: 5.4 G/DL (ref 6.4–8.2)
WBC # BLD: 7.3 K/UL (ref 4–11)

## 2021-10-22 PROCEDURE — 3700000000 HC ANESTHESIA ATTENDED CARE: Performed by: INTERNAL MEDICINE

## 2021-10-22 PROCEDURE — 6360000002 HC RX W HCPCS: Performed by: NURSE ANESTHETIST, CERTIFIED REGISTERED

## 2021-10-22 PROCEDURE — 99232 SBSQ HOSP IP/OBS MODERATE 35: CPT | Performed by: INTERNAL MEDICINE

## 2021-10-22 PROCEDURE — 2060000000 HC ICU INTERMEDIATE R&B

## 2021-10-22 PROCEDURE — 2580000003 HC RX 258: Performed by: INTERNAL MEDICINE

## 2021-10-22 PROCEDURE — 94640 AIRWAY INHALATION TREATMENT: CPT

## 2021-10-22 PROCEDURE — 6370000000 HC RX 637 (ALT 250 FOR IP): Performed by: INTERNAL MEDICINE

## 2021-10-22 PROCEDURE — 83735 ASSAY OF MAGNESIUM: CPT

## 2021-10-22 PROCEDURE — 6370000000 HC RX 637 (ALT 250 FOR IP): Performed by: PHYSICIAN ASSISTANT

## 2021-10-22 PROCEDURE — 3700000001 HC ADD 15 MINUTES (ANESTHESIA): Performed by: INTERNAL MEDICINE

## 2021-10-22 PROCEDURE — 2580000003 HC RX 258: Performed by: NURSE ANESTHETIST, CERTIFIED REGISTERED

## 2021-10-22 PROCEDURE — 94660 CPAP INITIATION&MGMT: CPT

## 2021-10-22 PROCEDURE — 85014 HEMATOCRIT: CPT

## 2021-10-22 PROCEDURE — 3609010600 HC COLONOSCOPY POLYPECTOMY SNARE/COLD BIOPSY: Performed by: INTERNAL MEDICINE

## 2021-10-22 PROCEDURE — 94761 N-INVAS EAR/PLS OXIMETRY MLT: CPT

## 2021-10-22 PROCEDURE — 7100000010 HC PHASE II RECOVERY - FIRST 15 MIN: Performed by: INTERNAL MEDICINE

## 2021-10-22 PROCEDURE — 85025 COMPLETE CBC W/AUTO DIFF WBC: CPT

## 2021-10-22 PROCEDURE — 36415 COLL VENOUS BLD VENIPUNCTURE: CPT

## 2021-10-22 PROCEDURE — 85018 HEMOGLOBIN: CPT

## 2021-10-22 PROCEDURE — 88305 TISSUE EXAM BY PATHOLOGIST: CPT

## 2021-10-22 PROCEDURE — 2709999900 HC NON-CHARGEABLE SUPPLY: Performed by: INTERNAL MEDICINE

## 2021-10-22 PROCEDURE — 2500000003 HC RX 250 WO HCPCS: Performed by: NURSE ANESTHETIST, CERTIFIED REGISTERED

## 2021-10-22 PROCEDURE — 7100000011 HC PHASE II RECOVERY - ADDTL 15 MIN: Performed by: INTERNAL MEDICINE

## 2021-10-22 PROCEDURE — 80053 COMPREHEN METABOLIC PANEL: CPT

## 2021-10-22 PROCEDURE — 2700000000 HC OXYGEN THERAPY PER DAY

## 2021-10-22 PROCEDURE — 0DBP8ZZ EXCISION OF RECTUM, VIA NATURAL OR ARTIFICIAL OPENING ENDOSCOPIC: ICD-10-PCS | Performed by: INTERNAL MEDICINE

## 2021-10-22 RX ORDER — PHENYLEPHRINE HCL IN 0.9% NACL 1 MG/10 ML
SYRINGE (ML) INTRAVENOUS PRN
Status: DISCONTINUED | OUTPATIENT
Start: 2021-10-22 | End: 2021-10-22 | Stop reason: SDUPTHER

## 2021-10-22 RX ORDER — PROPOFOL 10 MG/ML
INJECTION, EMULSION INTRAVENOUS PRN
Status: DISCONTINUED | OUTPATIENT
Start: 2021-10-22 | End: 2021-10-22 | Stop reason: SDUPTHER

## 2021-10-22 RX ORDER — SODIUM CHLORIDE, SODIUM LACTATE, POTASSIUM CHLORIDE, CALCIUM CHLORIDE 600; 310; 30; 20 MG/100ML; MG/100ML; MG/100ML; MG/100ML
INJECTION, SOLUTION INTRAVENOUS CONTINUOUS PRN
Status: DISCONTINUED | OUTPATIENT
Start: 2021-10-22 | End: 2021-10-22 | Stop reason: SDUPTHER

## 2021-10-22 RX ORDER — LIDOCAINE HYDROCHLORIDE 20 MG/ML
INJECTION, SOLUTION INFILTRATION; PERINEURAL PRN
Status: DISCONTINUED | OUTPATIENT
Start: 2021-10-22 | End: 2021-10-22 | Stop reason: SDUPTHER

## 2021-10-22 RX ADMIN — IPRATROPIUM BROMIDE AND ALBUTEROL SULFATE 1 AMPULE: .5; 2.5 SOLUTION RESPIRATORY (INHALATION) at 22:41

## 2021-10-22 RX ADMIN — BUSPIRONE HYDROCHLORIDE 10 MG: 10 TABLET ORAL at 20:55

## 2021-10-22 RX ADMIN — SODIUM CHLORIDE, PRESERVATIVE FREE 10 ML: 5 INJECTION INTRAVENOUS at 20:58

## 2021-10-22 RX ADMIN — ACETAMINOPHEN 650 MG: 325 TABLET ORAL at 03:06

## 2021-10-22 RX ADMIN — Medication 100 MCG: at 15:46

## 2021-10-22 RX ADMIN — METOPROLOL TARTRATE 25 MG: 25 TABLET, FILM COATED ORAL at 20:55

## 2021-10-22 RX ADMIN — SODIUM CHLORIDE, POTASSIUM CHLORIDE, SODIUM LACTATE AND CALCIUM CHLORIDE: 600; 310; 30; 20 INJECTION, SOLUTION INTRAVENOUS at 14:45

## 2021-10-22 RX ADMIN — LIDOCAINE HYDROCHLORIDE 5 ML: 20 INJECTION, SOLUTION INFILTRATION; PERINEURAL at 15:38

## 2021-10-22 RX ADMIN — ATORVASTATIN CALCIUM 20 MG: 10 TABLET, FILM COATED ORAL at 20:55

## 2021-10-22 RX ADMIN — IPRATROPIUM BROMIDE AND ALBUTEROL SULFATE 1 AMPULE: .5; 2.5 SOLUTION RESPIRATORY (INHALATION) at 19:16

## 2021-10-22 RX ADMIN — IPRATROPIUM BROMIDE AND ALBUTEROL SULFATE 1 AMPULE: .5; 2.5 SOLUTION RESPIRATORY (INHALATION) at 07:00

## 2021-10-22 RX ADMIN — PROPOFOL 90 MG: 10 INJECTION, EMULSION INTRAVENOUS at 15:38

## 2021-10-22 RX ADMIN — Medication 200 MCG: at 15:44

## 2021-10-22 RX ADMIN — SODIUM CHLORIDE, PRESERVATIVE FREE 10 ML: 5 INJECTION INTRAVENOUS at 08:00

## 2021-10-22 RX ADMIN — Medication 119 G: at 03:38

## 2021-10-22 ASSESSMENT — PAIN DESCRIPTION - ORIENTATION: ORIENTATION: RIGHT

## 2021-10-22 ASSESSMENT — PAIN - FUNCTIONAL ASSESSMENT: PAIN_FUNCTIONAL_ASSESSMENT: 0-10

## 2021-10-22 ASSESSMENT — PAIN DESCRIPTION - PAIN TYPE: TYPE: ACUTE PAIN

## 2021-10-22 ASSESSMENT — PAIN SCALES - GENERAL
PAINLEVEL_OUTOF10: 0
PAINLEVEL_OUTOF10: 0
PAINLEVEL_OUTOF10: 4
PAINLEVEL_OUTOF10: 0
PAINLEVEL_OUTOF10: 4

## 2021-10-22 ASSESSMENT — PAIN DESCRIPTION - LOCATION: LOCATION: RIB CAGE

## 2021-10-22 ASSESSMENT — ENCOUNTER SYMPTOMS: SHORTNESS OF BREATH: 1

## 2021-10-22 NOTE — PLAN OF CARE
Problem: Skin Integrity:  Goal: Will show no infection signs and symptoms  Description: Will show no infection signs and symptoms  10/22/2021 0746 by Walker Fernandez RN  Outcome: Ongoing  10/21/2021 2217 by Jaya Jean RN  Outcome: Ongoing  Goal: Absence of new skin breakdown  Description: Absence of new skin breakdown  10/22/2021 0746 by Walker Fernandez RN  Outcome: Ongoing  10/21/2021 2217 by Jaya Jean RN  Outcome: Ongoing     Problem: Falls - Risk of:  Goal: Will remain free from falls  Description: Will remain free from falls  10/22/2021 0746 by Walker Fernandez RN  Outcome: Ongoing  10/21/2021 2217 by Jaya Jean RN  Outcome: Ongoing  Goal: Absence of physical injury  Description: Absence of physical injury  10/22/2021 0746 by Walker Fernandez RN  Outcome: Ongoing  10/21/2021 2217 by Jaya Jean RN  Outcome: Ongoing     Problem: Pain:  Goal: Pain level will decrease  Description: Pain level will decrease  Outcome: Ongoing  Goal: Control of acute pain  Description: Control of acute pain  Outcome: Ongoing  Goal: Control of chronic pain  Description: Control of chronic pain  Outcome: Ongoing

## 2021-10-22 NOTE — PROGRESS NOTES
Pt transported back to PCU. Bi PAP changed back to [place] for transport. Pt is alert, denies any discomfort. No respiratory difficulty.

## 2021-10-22 NOTE — PROGRESS NOTES
Progress Note    Admit Date:  10/17/2021    Admitted with acute on chronic respiratory failure with hypoxia and hypercarbia  Multiple admissions over last 2 months    Subjective:    10/19  She was on BiPAP last night. Currently on 2 L of oxygen  Hemoglobin dropped to 6.7--     10/20  Pt remains in bed, reports breathing slightly better after lasix  Drop in h.h with no visible bleeding noted  Holding plavix for outpt thoracocentesis next week        10/21  Ms. Smith stated she is feeling about the same. On 2 Liters of oxygen. +occult blood, GI planning for colonoscopy on Friday. Holding Plavix for thoracentesis as well as drop in H/H        10/22  Resting in bed with BIPAP on. Pt awakes to verbal stimuli. Pt ws up most of the night due to prep for colonoscopy. NPO for egd/colon today. H/H stable. Objective:    Patient Vitals for the past 4 hrs:   SpO2   10/22/21 0701 99 %            Intake/Output Summary (Last 24 hours) at 10/22/2021 0903  Last data filed at 10/21/2021 1921  Gross per 24 hour   Intake 0 ml   Output 225 ml   Net -225 ml       Physical Exam:    Gen: No distress. Alert. Appears chronically frail and chronically ill  Eyes: PERRL. No sclera icterus. No conjunctival injection. ENT: No discharge. Pharynx clear. Neck: No JVD. Trachea midline. Resp: No accessory muscle use. Diminished throughout. No crackles. No wheezes. No rhonchi. CV: Regular rate. Regular rhythm. No murmur. No rub. No edema. Capillary Refill: Brisk,< 3 seconds   Peripheral Pulses: +2 palpable, equal bilaterally   GI: Non-tender. Non-distended. Normal bowel sounds. Skin: Warm and dry. No nodule on exposed extremities. No rash on exposed extremities. Scattered ecchymosis on BUE and BLE Left wrist wrapped with kerlex  M/S: No cyanosis. No joint deformity. No clubbing. Neuro: Awake.  Grossly nonfocal    Psych: Oriented x 3. +anxiety      Scheduled Meds:   furosemide  20 mg Oral See Admin Instructions    predniSONE 30 mg Oral Daily    atorvastatin  20 mg Oral Nightly    busPIRone  10 mg Oral TID    [Held by provider] clopidogrel  75 mg Oral Daily    ferrous sulfate  325 mg Oral Daily with breakfast    magnesium oxide  400 mg Oral Daily    metoprolol tartrate  25 mg Oral BID    pantoprazole  20 mg Oral Daily    polyethylene glycol  17 g Oral BID    sertraline  50 mg Oral Daily    [Held by provider] enoxaparin  40 mg SubCUTAneous Daily    ipratropium-albuterol  1 ampule Inhalation Q4H WA    sodium chloride flush  10 mL IntraVENous 2 times per day       Continuous Infusions:   sodium chloride      sodium chloride Stopped (10/20/21 1340)    dextrose         PRN Meds:  sodium chloride, melatonin, ondansetron **OR** ondansetron, polyethylene glycol, acetaminophen **OR** acetaminophen, albuterol, sodium chloride flush, sodium chloride, glucose, dextrose, glucagon (rDNA), dextrose      Data:  CBC:   Recent Labs     10/20/21  0522 10/20/21  1757 10/21/21  0426 10/21/21  1757 10/22/21  0441   WBC 6.7  --  8.2  --  7.3   HGB 6.7*   < > 9.1* 9.4* 9.3*   HCT 20.9*   < > 27.8* 28.6* 28.9*   MCV 92.7  --  90.6  --  91.6     --  243  --  242    < > = values in this interval not displayed. BMP:   Recent Labs     10/20/21  0522 10/21/21  0427 10/22/21  0441    142 139   K 3.3* 3.7 3.5    102 102   CO2 32 32 30   BUN 24* 20 15   CREATININE 0.9 0.7 0.7     LIVER PROFILE:   Recent Labs     10/20/21  0522 10/21/21  0427 10/22/21  0441   AST 10* 12* 13*   ALT 10 10 8*   BILITOT 0.4 0.9 0.5   ALKPHOS 61 61 61       CULTURES  COVID: negative   Blood cx x2: NGTD     RADIOLOGY  CT ABDOMEN PELVIS WO CONTRAST Additional Contrast? None   Final Result   No significant acute abdominal abnormality. No retroperitoneal bleed. Very   small amount of free fluid in the right pelvis of uncertain etiology and   significance. Cholelithiasis without finding of acute cholecystitis.   Large   left and moderate right pleural effusions with bibasilar atelectasis. Mild   anasarca. RECOMMENDATIONS:   If the patient is short of breath, ultrasound-guided thoracentesis would be   recommended for its diagnostic and therapeutic benefits. XR CHEST PORTABLE   Final Result   1. Patchy airspace disease, right perihilar region, suggesting pneumonia or   atelectasis   2. Small pleural effusions   3. Improved aeration present within the left lung base, consistent with   resolving atelectasis and or pneumonia   4. Underlying interstitial pulmonary fibrosis               Assessment/Plan:    Acute on chronic hypoxic and hypercarbic respiratory failure. -Secondary to acute COPD exacerbation possible healthcare associated pneumonia. -BiPAP at night. Currently on 2 L of oxygen. previously on AVAPS with home Trilogy.   -Continue prednisone 40 mg daily.  -was given  cefepime and vancomycin day #4. And doxycycline day #3 , however pt has been on multiple rounds of abx recently and no clear evidence of pna this time   - Wbc stable. Will stop abx   - left pleural effusion - on going diuresis, planned for thoracocentesis soon D#2 holding Plavix   - nursing to verify home dose of Lasix, plan to resume PO today- patient only takes on Tuesday and Fridays at home. Will reorder   - pulmonary consulted.  - likely have thoracentesis outpatient once off of Plavix for 5 days       Acute on chronic anemia  -Her hemoglobin is 8.1 on 10/13/2021  10.3--> 7.5--> 7.6 -->6.7-->7.6-->7.9--6.7  - give 1 unit PRBC on 10/20-->stable at 9.3 today  -Recent iron studies shows anemia of iron deficiency and chronic disease  -CT abd/pelvis   -Hemoccult stools - positive   -She had a colonoscopy 2/2020-showed hemorrhoids-Dr. Sony Leger  -Holding Plavix and Lovenox  for now  - GI consulted, plan for EGD/colonscopy today     Hypokalemia -resolved  - replacement ordered  - monitor     Hypomagnesemia  - 1.4, 2 gm replacement ordered  -monitor  - repeat ordered and stable #CAD  #HTN  - cont lopressor, statin  - holding plavix 2 to drop in H/H     #GERD  - cont PPI     #Severe malnutrition  #Generalized weakness and debility      DVT Prophylaxis: scds  Diet: Diet NPO Exceptions are: Ice Chips, Sips of Water with Meds  Code Status: Full Code       Veronica Finley, APRN - CNP     Pt seen and examined   Discussed with family in room, ok for dc if remains stable post EGD and colon  Agree with above  Changes made to note    Max Martinez MD,10/22/2021 2:06 PM

## 2021-10-22 NOTE — PROGRESS NOTES
Shift report give to [facility]. pt stable, care transfered at this time.  Michael Esparza RN

## 2021-10-22 NOTE — PROGRESS NOTES
Pt prepped for colonoscopy today . Clear watery stools noted. Pt buttocks red and irritated . Clean and pat dry buttocks , Zinc paste applied and offloading pressure with pillows.  Will apply foam dressing when colonoscopy complete and continue to monitor

## 2021-10-22 NOTE — ANESTHESIA PRE PROCEDURE
Department of Anesthesiology  Preprocedure Note       Name:  Benton Logan   Age:  [de-identified] y.o.  :  1940                                          MRN:  [id number]         Date:  10/22/2021      Surgeon: Yandy Signs):  Surinder Morris MD    Procedure: Procedure(s):  COLON W/ANES. (14:30)    Medications prior to admission:   Prior to Admission medications    Medication Sig Start Date End Date Taking? Authorizing Provider   LORazepam (ATIVAN) 0.5 MG tablet Take 0.5 mg by mouth nightly as needed. 21  Yes Historical Provider, MD   albuterol sulfate (PROAIR RESPICLICK) 160 (90 Base) MCG/ACT aerosol powder inhalation 2 puff: EVERY 6 HOURS 21  Yes Historical Provider, MD   OXYGEN Inhale 2 L into the lungs 21  Yes Historical Provider, MD   predniSONE (DELTASONE) 10 MG tablet 3 tabs a day for 5 days,  Then 2 tabs a day for 5 days ,  Then 1 tab daily 10/14/21  Yes Teo Chambers MD   miconazole nitrate 2 % OINT Apply topically 2 times daily 10/14/21  Yes Teo Chambers MD   busPIRone (BUSPAR) 10 MG tablet Take 10 mg by mouth 3 times daily Indications: Feeling Anxious   Yes Historical Provider, MD   ondansetron (ZOFRAN-ODT) 4 MG disintegrating tablet Take 1 tablet by mouth every 8 hours as needed for Nausea or Vomiting 21  Yes Tayler Yen MD   furosemide (LASIX) 20 MG tablet Take 1 tablet by mouth See Admin Instructions Tuesday, 21  Yes Tayler Yen MD   magnesium oxide (MAG-OX) 400 (241.3 Mg) MG TABS tablet Take 1 tablet by mouth daily 21  Yes Tayler Yen MD   potassium & sodium phosphates (PHOS-NAK) 280-160-250 MG PACK Take 1 packet by mouth daily 21  Yes Tayler Yen MD   menthol-zinc oxide (CALMOSEPTINE) 0.44-20.6 % OINT ointment Apply topically 2 times daily as needed (Apply to buttocks until resolved) Max 30 ml per day.  21  Yes Nic Villarreal MD   melatonin 5 MG TBDP disintegrating tablet Take 1 tablet by mouth nightly as needed (sleep) 9/14/21  Yes Benjamin Matute MD   pantoprazole (PROTONIX) 20 MG tablet Take 20 mg by mouth daily   Yes Historical Provider, MD   ferrous sulfate (IRON 325) 325 (65 Fe) MG tablet Take 325 mg by mouth daily (with breakfast)   Yes Historical Provider, MD   Roflumilast (DALIRESP) 250 MCG tablet Take 250 mcg by mouth daily   Yes Historical Provider, MD   acetaminophen (TYLENOL) 500 MG tablet Take 500 mg by mouth every 6 hours as needed for Pain   Yes Historical Provider, MD   sertraline (ZOLOFT) 50 MG tablet Take 50 mg by mouth daily   Yes Historical Provider, MD   budesonide (PULMICORT) 0.5 MG/2ML nebulizer suspension Take 2 mLs by nebulization 2 times daily 9/24/19  Yes Bk Smith MD   ipratropium-albuterol (DUONEB) 0.5-2.5 (3) MG/3ML SOLN nebulizer solution Inhale 3 mLs into the lungs every 6 hours 9/24/19  Yes Bk Smith MD   atorvastatin (LIPITOR) 20 MG tablet Take 1 tablet by mouth nightly 9/24/19  Yes Bk Smith MD   metoprolol tartrate (LOPRESSOR) 25 MG tablet Take 1 tablet by mouth 2 times daily 9/24/19  Yes Bk Smith MD   clopidogrel (PLAVIX) 75 MG tablet Take 1 tablet by mouth daily 9/7/19  Yes Estefania Rojas MD   tiotropium (SPIRIVA HANDIHALER) 18 MCG inhalation capsule Inhale 18 mcg into the lungs daily 7/25/16  Yes Historical Provider, MD   budesonide-formoterol (SYMBICORT) 160-4.5 MCG/ACT AERO Inhale 2 puffs into the lungs 2 times daily   Yes Historical Provider, MD   albuterol (PROVENTIL HFA) 108 (90 BASE) MCG/ACT inhaler Inhale 2 puffs into the lungs every 6 hours as needed for Wheezing or Shortness of Breath.  12/20/12  Yes See Bhakta MD   fluticasone (FLONASE) 50 MCG/ACT nasal spray 1 spray by Each Nostril route daily as needed for Rhinitis 9/14/21   Benjamin Matute MD   docusate sodium (COLACE, DULCOLAX) 100 MG CAPS Take 100 mg by mouth 2 times daily 9/24/19   Yolanda Hutchins MD       Current medications:    Current Facility-Administered Medications Medication Dose Route Frequency Provider Last Rate Last Admin    furosemide (LASIX) tablet 20 mg  20 mg Oral See Admin Instructions Dayan Shea APRN - CNP        predniSONE (DELTASONE) tablet 30 mg  30 mg Oral Daily Jessica Crisostomo PA-C        0.9 % sodium chloride infusion  250 mL IntraVENous PRN Dayan Shea APRN - CNP        atorvastatin (LIPITOR) tablet 20 mg  20 mg Oral Nightly Jacque Worthington MD   20 mg at 10/21/21 2043    busPIRone (BUSPAR) tablet 10 mg  10 mg Oral TID Jacque Worthington MD   10 mg at 10/21/21 2043    [Held by provider] clopidogrel (PLAVIX) tablet 75 mg  75 mg Oral Daily Jacque Worthington MD   75 mg at 10/19/21 0809    ferrous sulfate (IRON 325) tablet 325 mg  325 mg Oral Daily with breakfast Jacque Worthington MD   325 mg at 10/21/21 0840    magnesium oxide (MAG-OX) tablet 400 mg  400 mg Oral Daily Jacque Worthington MD   400 mg at 10/21/21 0840    melatonin disintegrating tablet 5 mg  5 mg Oral Nightly PRN Jacque Worthington MD        metoprolol tartrate (LOPRESSOR) tablet 25 mg  25 mg Oral BID Jacque Worthington MD   25 mg at 10/21/21 2044    pantoprazole (PROTONIX) tablet 20 mg  20 mg Oral Daily Jacque Worthington MD   20 mg at 10/21/21 0840    polyethylene glycol (GLYCOLAX) packet 17 g  17 g Oral BID Jacque Worthington MD   17 g at 10/21/21 2044    sertraline (ZOLOFT) tablet 50 mg  50 mg Oral Daily Jacque Worthington MD   50 mg at 10/21/21 0840    ondansetron (ZOFRAN-ODT) disintegrating tablet 4 mg  4 mg Oral Q8H PRN Jacque Worthington MD   4 mg at 10/20/21 8982    Or    ondansetron (ZOFRAN) injection 4 mg  4 mg IntraVENous Q6H PRN Jacque Worthington MD        polyethylene glycol (GLYCOLAX) packet 17 g  17 g Oral Daily PRN Jacque Worthington MD        [Held by provider] enoxaparin (LOVENOX) injection 40 mg  40 mg SubCUTAneous Daily Nadiya Hernandez, MD   40 mg at 10/18/21 0853    acetaminophen (TYLENOL) tablet 650 mg  650 mg Oral Q6H PRN Obey Johnson MD   650 mg at 10/22/21 0306    Or    acetaminophen (TYLENOL) suppository 650 mg  650 mg Rectal Q6H PRN Obey Johnson MD        albuterol (PROVENTIL) nebulizer solution 2.5 mg  2.5 mg Nebulization Q2H PRN Obey Johnson MD        ipratropium-albuterol (DUONEB) nebulizer solution 1 ampule  1 ampule Inhalation Q4H WA Obey Johnson MD   1 ampule at 10/22/21 0700    sodium chloride flush 0.9 % injection 10 mL  10 mL IntraVENous 2 times per day Obey Johnson MD   10 mL at 10/22/21 0800    sodium chloride flush 0.9 % injection 10 mL  10 mL IntraVENous PRN Obey Johnson MD        0.9 % sodium chloride infusion  25 mL IntraVENous PRN Obey Johnson MD   Stopped at 10/20/21 1340    glucose (GLUTOSE) 40 % oral gel 15 g  15 g Oral PRN Obey Johnson MD        dextrose 50 % IV solution  12.5 g IntraVENous PRN Obey Johnson MD        glucagon (rDNA) injection 1 mg  1 mg IntraMUSCular PRN Obey Johnson MD        dextrose 5 % solution  100 mL/hr IntraVENous PRN Obey Johnson MD           Allergies:     Allergies   Allergen Reactions    Latex Other (See Comments)     Burning      Codeine Other (See Comments)     \"hallucinations\"       Problem List:    Patient Active Problem List   Diagnosis Code    Hyperlipidemia E78.5    Asthma J45.909    OA (osteoarthritis) M19.90    Tobacco use Z72.0    COPD exacerbation ([facility] J44.1    Sepsis ([facility] A41.9    Abnormal chest x-ray R93.89    COPD with acute exacerbation ([facility] J44.1    Shortness of breath R06.02    Tobacco abuse Z72.0    Moderate malnutrition ([facility] E44.0    NSTEMI (non-ST elevated myocardial infarction) ([facility] I21.4    Elevated troponin R77.8    Acute respiratory failure with hypoxia (HCC) J96.01    CAD (coronary artery disease) I25.10    Acute pulmonary edema (HCC) J81.0    Pulmonary infiltrate R91.8    Elevated brain natriuretic peptide (BNP) level R79.89    Leukocytosis D72.829    Ischemic cardiomyopathy I25.5    Acute combined systolic and diastolic congestive heart failure (HCC) I50.41    Hypernatremia E87.0    NADJA (acute kidney injury) ([facility] N17.9    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    Mucus plugging of bronchi T17.500A    CAD in native artery I25.10    S/P CABG (coronary artery bypass graft) Z95.1    Pneumonia of both lower lobes due to methicillin resistant Staphylococcus aureus (MRSA) ([facility]) J15.212    GI bleed K92.2    Severe malnutrition ([facility]) E43    Chest pain R07.9    Chronic respiratory failure with hypoxia ([facility]) J96.11    Primary hypertension I10    Anemia D64.9    Acute respiratory failure ([facility]) J96.00    Acute respiratory distress R06.03    Volume overload E87.70    Demand ischemia ([facility]) I24.8    GERD (gastroesophageal reflux disease) K21.9    Acute respiratory failure with hypoxia and hypercapnia ([facility]) J96.01, J96.02    Shock ([facility]) R57.9    Pneumonia due to infectious organism J18.9    Acute on chronic respiratory failure with hypoxia and hypercapnia ([facility]) J96.21, J96.22    Chronic obstructive pulmonary disease (HCC) J44.9    Acute on chronic respiratory failure with hypoxemia (HCC) J96.21    Chronic anxiety F41.9    Congestive heart failure (HCC) I50.9    Acute respiratory failure with hypoxia and hypercarbia ([facility]) J96.01, J96.02    Acute kidney injury (HCC) N17.9    Elevated procalcitonin R79.89    COPD, severe (HCC) J44.9    Anxiety F41.9    Severe anxiety F41.9    Severe protein-calorie malnutrition (HCC) E43    HCAP (healthcare-associated pneumonia) J18.9    Pleural effusion J90       Past Medical History:        Diagnosis Date    NADJA (acute kidney injury) ([facility]     Asthma     Chronic anxiety     [facility]     GERD (gastroesophageal reflux disease) 2021    Hyperlipidemia     Hypertension     MRSA (methicillin resistant staph aureus) culture positive 2019    + resp cx    OA (osteoarthritis) 2014       Past Surgical History:        Procedure Laterality Date    BRONCHOSCOPY  2019    Dr. Zaynab Pedroza - w/BAL   [address]  2019    Dr. Kareem Abbott N/A 2020    COLONOSCOPY DIAGNOSTIC performed by Dorota Preston DO at [address] N/A 2019    OFF PUMP CORONARY ARTERY BYPASS GRAFTING X2, INTERNAL MAMMARY ARTERY, SAPHENOUS VEIN GRAFT performed by Harmony Purvis MD at [facility] CATH  2021    [facility]ATH 2021 [facility] PROCEDURES    MASTECTOMY, PARTIAL Left     SIGMOIDOSCOPY  2020    4 bands    SIGMOIDOSCOPY N/A 2020    FLEX SIG W/ BANDING SIGMOIDOSCOPY DIAGNOSTIC FLEXIBLE performed by Dorota Preston DO at [address] History:    Social History     Tobacco Use    Smoking status: Former Smoker     Packs/day: 0.50     Years: 50.00     Pack years: 25.00     Types: Cigarettes     Quit date: 2019     Years since quittin.0    Smokeless tobacco: Never Used    Tobacco comment: quit 2018   Substance Use Topics    Alcohol use:  No                                Counseling given: Not Answered  Comment: quit 2018      Vital Signs (Current):   Vitals:    10/22/21 0247 10/22/21 0701 10/22/21 0743 10/22/21 1421   BP: 135/81  112/65 (!) 145/72   Pulse: 63  80 68   Resp: 18  16 16   Temp: 97.4 °F (36.3 °C)  98 °F (36.7 °C) 97.6 °F (36.4 °C)   TempSrc: Oral  Oral Oral   SpO2: 99% 99% 96% 100%   Weight: 98 lb 3.2 oz (44.5 kg)   98 lb (44.5 kg)   Height:    5' 1\" (1.549 m)                                              BP Readings from Last 3 Encounters:   10/22/21 (!) 145/72   10/14/21 122/74   21 119/72       NPO Status: Time of last liquid consumption: 0500                        Time of last solid consumption: 0900                        Date of last liquid consumption: 10/22/21                        Date of last solid food consumption: 10/21/21    BMI:   Wt Readings from Last 3 Encounters:   10/22/21 98 lb (44.5 kg)   10/14/21 106 lb 3.2 oz (48.2 kg)   09/28/21 107 lb (48.5 kg)     Body mass index is 18.52 kg/m².     CBC:   Lab Results   Component Value Date    WBC 7.3 10/22/2021    RBC 3.15 10/22/2021    HGB 9.3 10/22/2021    HCT 28.9 10/22/2021    MCV 91.6 10/22/2021    RDW 20.4 10/22/2021     10/22/2021       CMP:   Lab Results   Component Value Date     10/22/2021    K 3.5 10/22/2021     10/22/2021    CO2 30 10/22/2021    BUN 15 10/22/2021    CREATININE 0.7 10/22/2021    GFRAA >60 10/22/2021    GFRAA >60 05/08/2013    AGRATIO 1.5 10/22/2021    LABGLOM >60 10/22/2021    GLUCOSE 78 10/22/2021    PROT 5.4 10/22/2021    PROT 7.81 12/20/2012    CALCIUM 8.9 10/22/2021    BILITOT 0.5 10/22/2021    ALKPHOS 60 10/22/2021    AST 13 10/22/2021    ALT 8 10/22/2021       POC Tests:   Recent Labs     10/21/21  1119   POCGLU 119*       Coags:   Lab Results   Component Value Date    PROTIME 10.3 10/17/2021    INR 0.91 10/17/2021    APTT 30.4 09/28/2021       HCG (If Applicable): No results found for: PREGTESTUR, PREGSERUM, HCG, HCGQUANT     ABGs:   Lab Results   Component Value Date    PHART 7.361 10/17/2021    PO2ART 156.6 10/17/2021    SZO1RNO 49.5 10/17/2021    DDK6BML 27.4 10/17/2021    BEART 1.6 10/17/2021    S8TLGXWZ 98.9 10/17/2021        Type & Screen (If Applicable):  No results found for: LABABO, LABRH    Drug/Infectious Status (If Applicable):  No results found for: HIV, HEPCAB    COVID-19 Screening (If Applicable):   Lab Results   Component Value Date    COVID19 Not Detected 10/17/2021    COVID19 NOT DETECTED 10/12/2021           Anesthesia Evaluation  Patient summary reviewed and Nursing notes reviewed no history of anesthetic complications:   Airway: Mallampati: II     Neck ROM: full   Dental:          Pulmonary:   (+) pneumonia:  COPD:  shortness of breath:  asthma:                            Cardiovascular:    (+) hypertension:, past MI:, CAD:, CABG/stent:, CHF:,                   Neuro/Psych:   (+) neuromuscular disease:,             GI/Hepatic/Renal:   (+) GERD:,           Endo/Other:                     Abdominal:             Vascular: Other Findings:             Anesthesia Plan      MAC     ASA 4     (Medications & allergies reviewed  All available lab & EKG data reviewed)  Induction: intravenous. Anesthetic plan and risks discussed with patient. Plan discussed with CRNA.                   Hafsa Pizano MD   10/22/2021

## 2021-10-22 NOTE — PROGRESS NOTES
Colonoscopy completed. Tolerated well. IV in left forearm cool to touch and swollen. Site discontinued. Resp therapy in, Bi Pap placed on as requested per Dr. Germaine Harrison after procedure. Pt is awake and talking.

## 2021-10-22 NOTE — PROGRESS NOTES
RT Inhaler-Nebulizer Bronchodilator Protocol Note    There is a bronchodilator order in the chart from a provider indicating to follow the RT Bronchodilator Protocol and there is an Initiate RT Inhaler-Nebulizer Bronchodilator Protocol order as well (see protocol at bottom of note). CXR Findings:  No results found. The findings from the last RT Protocol Assessment were as follows:   History Pulmonary Disease: Chronic pulmonary disease  Respiratory Pattern: Dyspnea on exertion or RR 21-25 bpm  Breath Sounds: Slightly diminished and/or crackles  Cough: Strong, productive  Indication for Bronchodilator Therapy: Decreased or absent breath sounds  Bronchodilator Assessment Score: 7    Aerosolized bronchodilator medication orders have been revised according to the RT Inhaler-Nebulizer Bronchodilator Protocol below. Respiratory Therapist to perform RT Therapy Protocol Assessment initially then follow the protocol. Repeat RT Therapy Protocol Assessment PRN for score 0-3 or on second treatment, BID, and PRN for scores above 3. No Indications  adjust the frequency to every 6 hours PRN wheezing or bronchospasm, if no treatments needed after 48 hours then discontinue using Per Protocol order mode. If indication present, adjust the RT bronchodilator orders based on the Bronchodilator Assessment Score as indicated below. Use Inhaler orders unless patient has one or more of the following: on home nebulizer, not able to hold breath for 10 seconds, is not alert and oriented, cannot activate and use MDI correctly, or respiratory rate 25 breaths per minute or more, then use the equivalent nebulizer order(s) with same Frequency and PRN reasons based on the score. If a patient is on this medication at home then do not decrease Frequency below that used at home.     0-3  enter or revise RT bronchodilator order(s) to equivalent RT Bronchodilator order with Frequency of every 4 hours PRN for wheezing or increased work of breathing using Per Protocol order mode. 4-6  enter or revise RT Bronchodilator order(s) to two equivalent RT bronchodilator orders with one order with BID Frequency and one order with Frequency of every 4 hours PRN wheezing or increased work of breathing using Per Protocol order mode. 7-10  enter or revise RT Bronchodilator order(s) to two equivalent RT bronchodilator orders with one order with TID Frequency and one order with Frequency of every 4 hours PRN wheezing or increased work of breathing using Per Protocol order mode. 11-13  enter or revise RT Bronchodilator order(s) to one equivalent RT bronchodilator order with QID Frequency and an Albuterol order with Frequency of every 4 hours PRN wheezing or increased work of breathing using Per Protocol order mode. Greater than 13  enter or revise RT Bronchodilator order(s) to one equivalent RT bronchodilator order with every 4 hours Frequency and an Albuterol order with Frequency of every 2 hours PRN wheezing or increased work of breathing using Per Protocol order mode.          Electronically signed by Liam Ness RCP on 10/22/2021 at 7:06 AM

## 2021-10-22 NOTE — PROGRESS NOTES
Attempted x2 to restart IV to R arm without success. Pt skin is very fragile with poor venous access. Report re colonoscopy called to Asuncion Palafox RN in PCU. Informed that pt currently has no venous access.

## 2021-10-22 NOTE — H&P
History and Physical / Pre-Sedation Assessment    Patient:  Saeed Mendoza   :   1940     Intended Procedure:  Colonoscopy    HPI: [de-identified]year old female with a history of HTN, HLD, COPD, NADJA, asthma, anxiety, GERD, and OA admitted with acute respiratory failure.  Iron deficiency anemia is likely due to anemia of chronic disease but cannot exclude slow gi blood loss    Past Medical History:   Diagnosis Date    NADJA (acute kidney injury) ([facility]     Asthma     Chronic anxiety     COPD (chronic obstructive pulmonary disease) ([facility]     GERD (gastroesophageal reflux disease) 2021    Hyperlipidemia     Hypertension     MRSA (methicillin resistant staph aureus) culture positive 2019    + resp cx    OA (osteoarthritis) 2014     Past Surgical History:   Procedure Laterality Date    BRONCHOSCOPY  2019    Dr. Michelle Rocha - w/BAL    CARDIAC CATHETERIZATION  2019    Dr. Carlyn Kent N/A 2020    COLONOSCOPY DIAGNOSTIC performed by Nay Rodriguez DO at [address] N/A 2019    OFF PUMP CORONARY ARTERY BYPASS GRAFTING X2, INTERNAL MAMMARY ARTERY, SAPHENOUS VEIN GRAFT performed by Napoleon Rios MD at [address] CATH  2021    IR MIDLINE CATH 2021 [facility] PROCEDURES    MASTECTOMY, PARTIAL Left     SIGMOIDOSCOPY  2020    4 bands    SIGMOIDOSCOPY N/A 2020    FLEX SIG W/ BANDING SIGMOIDOSCOPY DIAGNOSTIC FLEXIBLE performed by Nay Rodriguez DO at [address] [facility]       Medications reviewed  Prior to Admission medications    Medication Sig Start Date End Date Taking? Authorizing Provider   LORazepam (ATIVAN) 0.5 MG tablet Take 0.5 mg by mouth nightly as needed.  21  Yes Historical Provider, MD   albuterol sulfate (PROAIR RESPICLICK) 742 (90 Base) MCG/ACT aerosol powder inhalation 2 puff: EVERY 6 HOURS 21  Yes Historical Provider, MD   OXYGEN Inhale 2 L into the lungs 6/1/21  Yes Historical Provider, MD   predniSONE (DELTASONE) 10 MG tablet 3 tabs a day for 5 days,  Then 2 tabs a day for 5 days ,  Then 1 tab daily 10/14/21  Yes Ashley Slater MD   miconazole nitrate 2 % OINT Apply topically 2 times daily 10/14/21  Yes Ashley Slater MD   busPIRone (BUSPAR) 10 MG tablet Take 10 mg by mouth 3 times daily Indications: Feeling Anxious   Yes Historical Provider, MD   ondansetron (ZOFRAN-ODT) 4 MG disintegrating tablet Take 1 tablet by mouth every 8 hours as needed for Nausea or Vomiting 9/27/21  Yes Shalonda Johnston MD   furosemide (LASIX) 20 MG tablet Take 1 tablet by mouth See Admin Instructions Tuesday, friday 9/27/21  Yes Shalonda Johnston MD   magnesium oxide (MAG-OX) 400 (241.3 Mg) MG TABS tablet Take 1 tablet by mouth daily 9/28/21  Yes Shalonda Johnston MD   potassium & sodium phosphates (PHOS-NAK) 280-160-250 MG PACK Take 1 packet by mouth daily 9/27/21  Yes Shalonda Johnston MD   menthol-zinc oxide (CALMOSEPTINE) 0.44-20.6 % OINT ointment Apply topically 2 times daily as needed (Apply to buttocks until resolved) Max 30 ml per day.  9/14/21  Yes Neha Singleton MD   melatonin 5 MG TBDP disintegrating tablet Take 1 tablet by mouth nightly as needed (sleep) 9/14/21  Yes Neha Singleton MD   pantoprazole (PROTONIX) 20 MG tablet Take 20 mg by mouth daily   Yes Historical Provider, MD   ferrous sulfate (IRON 325) 325 (65 Fe) MG tablet Take 325 mg by mouth daily (with breakfast)   Yes Historical Provider, MD   Roflumilast (DALIRESP) 250 MCG tablet Take 250 mcg by mouth daily   Yes Historical Provider, MD   acetaminophen (TYLENOL) 500 MG tablet Take 500 mg by mouth every 6 hours as needed for Pain   Yes Historical Provider, MD   sertraline (ZOLOFT) 50 MG tablet Take 50 mg by mouth daily   Yes Historical Provider, MD   budesonide (PULMICORT) 0.5 MG/2ML nebulizer suspension Take 2 mLs by nebulization 2 times daily 9/24/19  Yes Eladio Castillo MD ipratropium-albuterol (DUONEB) 0.5-2.5 (3) MG/3ML SOLN nebulizer solution Inhale 3 mLs into the lungs every 6 hours 9/24/19  Yes Brianna Barker MD   atorvastatin (LIPITOR) 20 MG tablet Take 1 tablet by mouth nightly 9/24/19  Yes Brianna Barker MD   metoprolol tartrate (LOPRESSOR) 25 MG tablet Take 1 tablet by mouth 2 times daily 9/24/19  Yes Brianna Barker MD   clopidogrel (PLAVIX) 75 MG tablet Take 1 tablet by mouth daily 9/7/19  Yes Michelle Corea MD   tiotropium (Lauralyn Lull) 18 MCG inhalation capsule Inhale 18 mcg into the lungs daily 7/25/16  Yes Historical Provider, MD   budesonide-formoterol (SYMBICORT) 160-4.5 MCG/ACT AERO Inhale 2 puffs into the lungs 2 times daily   Yes Historical Provider, MD   albuterol (PROVENTIL HFA) 108 (90 BASE) MCG/ACT inhaler Inhale 2 puffs into the lungs every 6 hours as needed for Wheezing or Shortness of Breath. 12/20/12  Yes Alayna Hunter MD   fluticasone (FLONASE) 50 MCG/ACT nasal spray 1 spray by Each Nostril route daily as needed for Rhinitis 9/14/21   Tremayne Henry MD   docusate sodium (COLACE, DULCOLAX) 100 MG CAPS Take 100 mg by mouth 2 times daily 9/24/19   Brianna Barker MD        Allergies: Allergies   Allergen Reactions    Latex Other (See Comments)     Burning      Codeine Other (See Comments)     \"hallucinations\"       Nurses notes reviewed and agreed. Physical Exam:  Vital Signs: BP (!) 145/72   Pulse 68   Temp 97.6 °F (36.4 °C) (Oral)   Resp 16   Ht 5' 1\" (1.549 m)   Wt 98 lb (44.5 kg)   SpO2 100%   BMI 18.52 kg/m²    Airway: Mallampati: II (soft palate, uvula, fauces visible)  Pulmonary:Normal  Cardiac:Normal  Abdomen:Normal    Pre-Procedure Assessment / Plan:  ASA: Class 3 - A patient with severe systemic disease that limits activity but is not incapacitating  Level of Sedation Plan: Moderate sedation  Post Procedure plan: Return to same level of care    I assessed the patient and find that the patient is in satisfactory condition to proceed with the planned procedure and sedation plan. I have explained the risk, benefits, and alternatives to the procedure; the patient understands and agrees to proceed.        Marimar Mcdaniel MD  10/22/2021

## 2021-10-22 NOTE — FLOWSHEET NOTE
10/22/21 1649   Vitals   Temp 97.1 °F (36.2 °C)   Temp Source Oral   Pulse 66   Heart Rate Source Monitor   Resp 18   BP (!) 145/69   BP Location Right upper arm   BP Upper/Lower Upper   BP Method Automatic   Patient Position Semi fowlers   Level of Consciousness Alert (0)   MEWS Score 1   Patient Currently in Pain Yes   Cardiac Rhythm Sinus rhythm   Oxygen Therapy   SpO2 100 %   O2 Flow Rate (L/min) 2 L/min   Pt back on floor from [facility]. Vitals stable. Dinner tray ordered for patient. No further needs at this time,call light within reach and daughter at bedside.

## 2021-10-22 NOTE — PROGRESS NOTES
Pt was awake and talking. Pt was on 2L NC, SpO2 100%.  Placed pt on bipap immediately after procedure 16/8 30%

## 2021-10-22 NOTE — PROGRESS NOTES
Patient is resting showing no s/s of distress. Patient is alert and oriented. Meds were given, see MAR. Patient is denying any needs. Bed is in lowest position and call light is within reach. Will continue to monitor. Shift assessment complete, see flowsheets.  Karyna Cardona RN

## 2021-10-22 NOTE — PROGRESS NOTES
Comprehensive Nutrition Assessment    Type and Reason for Visit:  Initial, RD Nutrition Re-Screen/LOS, NPO/Clear Liquid    Nutrition Recommendations/Plan:   changed diet to pureed d/t no dentures   Added ensure clear to all meals     Nutrition Assessment:    Pt. severely malnourished AEB moderate loss of muscle mass and fat tissue over the last 3 weeks AEB > 10# loss and intakes < 50% > 5 days d/t he racute on chronic respiratory failure with hypoxia  . At risk for further nutritional compromise r/t she was admitted with anemia and had colonoscopy today and she is unabale to chew any food r/t to unable to wear her dentures with the weight loss  . Will change diet and add supps.     Malnutrition Assessment:  Malnutrition Status:  Severe malnutrition    Context:  Acute Illness     Findings of the 6 clinical characteristics of malnutrition:  Energy Intake:  7 - 50% or less of estimated energy requirements for 5 or more days  Weight Loss:  1 - 5% over 1 month     Body Fat Loss:  7 - Moderate body fat loss Orbital   Muscle Mass Loss:  7 - Moderate muscle mass loss Temples (temporalis), Clavicles (pectoralis & deltoids)  Fluid Accumulation:  No significant fluid accumulation Extremities   Strength:       Estimated Daily Nutrient Needs:  Energy (kcal):  6988-8024 based ~ 30-35 kcal/kg cbw; Weight Used for Energy Requirements:  Current     Protein (g):  62-70 based ~ 1.4-1.6 gr/kg cbw; Weight Used for Protein Requirements:  Current        Fluid (ml/day):  8163-9279; Method Used for Fluid Requirements:  1 ml/kcal      Nutrition Related Findings:  Frail eldlery female sitting up in bed with family at bedside; A & O x 4; left wrist and hand wrapped with gauze and dark purple/black;  colonoscopy this afternoon; acute on chronic respiratory failure with hypoxia and hypercarbiaMultiple admissions over last 2 months; states she was eating very good when at home and her son was making meals for her;  inatkes have reduced with multi admission back and forth foth from [facility] to hospital      Wounds:  Stage II, Pressure Injury       Current Nutrition Therapies:    ADULT DIET; Regular    Anthropometric Measures:  · Height: 5' 1\" (154.9 cm)  · Current Body Weight: 98 lb (44.5 kg)   · Admission Body Weight: 110 lb (49.9 kg)    · Usual Body Weight: 110 lb (49.9 kg)     · Ideal Body Weight: 105 lbs; % Ideal Body Weight 93.3 %   · BMI: 18.5  · Adjusted Body Weight:  ; No Adjustment   · BMI Categories: Underweight (BMI less than 22) age over 72       Nutrition Diagnosis:   · Severe malnutrition related to altered GI function, impaired respiratory function, inadequate protein-energy intake as evidenced by NPO or clear liquid status due to medical condition, poor intake prior to admission, wounds, weight loss greater than or equal to 5% in 1 month, severe loss of subcutaneous fat, severe muscle loss      Nutrition Interventions:   Food and/or Nutrient Delivery:  Start Oral Nutrition Supplement  Nutrition Education/Counseling:  No recommendation at this time   Coordination of Nutrition Care:  Continue to monitor while inpatient    Goals:  patient will increase her nutrition intake by accepting and consuming > 50% of meals and supps       Nutrition Monitoring and Evaluation:   Behavioral-Environmental Outcomes:  None Identified   Food/Nutrient Intake Outcomes:  Food and Nutrient Intake, Supplement Intake  Physical Signs/Symptoms Outcomes:  Biochemical Data, Nutrition Focused Physical Findings, Weight, GI Status     Discharge Planning:     Too soon to determine     Electronically signed by Bradley Marin RD, LD on 10/22/21 at 6:10 PM EDT    Contact: [phone]

## 2021-10-22 NOTE — PLAN OF CARE
Nutrition Problem #1: Severe malnutrition  Intervention: Food and/or Nutrient Delivery: Start Oral Nutrition Supplement  Nutritional Goals: patient will increase her nutrition intake by accepting and consuming > 50% of meals and supps

## 2021-10-22 NOTE — PROGRESS NOTES
RT Inhaler-Nebulizer Bronchodilator Protocol Note    There is a bronchodilator order in the chart from a provider indicating to follow the RT Bronchodilator Protocol and there is an Initiate RT Inhaler-Nebulizer Bronchodilator Protocol order as well (see protocol at bottom of note). CXR Findings:  No results found. The findings from the last RT Protocol Assessment were as follows:   History Pulmonary Disease: Chronic pulmonary disease  Respiratory Pattern: Dyspnea on exertion or RR 21-25 bpm  Breath Sounds: Slightly diminished and/or crackles  Cough: Strong, productive  Indication for Bronchodilator Therapy: Decreased or absent breath sounds  Bronchodilator Assessment Score: 7    Aerosolized bronchodilator medication orders have been revised according to the RT Inhaler-Nebulizer Bronchodilator Protocol below. Respiratory Therapist to perform RT Therapy Protocol Assessment initially then follow the protocol. Repeat RT Therapy Protocol Assessment PRN for score 0-3 or on second treatment, BID, and PRN for scores above 3. No Indications  adjust the frequency to every 6 hours PRN wheezing or bronchospasm, if no treatments needed after 48 hours then discontinue using Per Protocol order mode. If indication present, adjust the RT bronchodilator orders based on the Bronchodilator Assessment Score as indicated below. Use Inhaler orders unless patient has one or more of the following: on home nebulizer, not able to hold breath for 10 seconds, is not alert and oriented, cannot activate and use MDI correctly, or respiratory rate 25 breaths per minute or more, then use the equivalent nebulizer order(s) with same Frequency and PRN reasons based on the score. If a patient is on this medication at home then do not decrease Frequency below that used at home.     0-3  enter or revise RT bronchodilator order(s) to equivalent RT Bronchodilator order with Frequency of every 4 hours PRN for wheezing or increased work

## 2021-10-22 NOTE — BRIEF OP NOTE
Brief Postoperative Note      Patient: Tabitha Prakash  YOB: 1940  MRN: [id number]    Date of Procedure: 10/22/2021    Pre-Op Diagnosis: ANEMIA    Post-Op Diagnosis: rectal polyp and internal hemorrhoids, otherwise normal colon       Procedure(s):  COLONOSCOPY POLYPECTOMY SNARE/COLD BIOPSY    Surgeon(s):  Curtis Dumont MD    Assistant:  * No surgical staff found *    Anesthesia: Monitor Anesthesia Care    Estimated Blood Loss (mL): Minimal    Complications: None    Specimens:   ID Type Source Tests Collected by Time Destination   A :  Tissue Biopsy SURGICAL PATHOLOGY Curtis Dumont MD 10/22/2021 1557        Implants:  * No implants in log *      Drains:   External Urinary Catheter (Active)   Urine Color Yellow 10/19/21 1747   Urine Appearance Clear 10/19/21 1747   Output (mL) 150 mL 10/21/21 1921   Placement Replaced 10/20/21 0300       [REMOVED] NG/OG/NJ/NE Tube Nasogastric 16 fr Right nostril (Removed)   Surrounding Skin Dry 10/12/21 0301   Securement device Yes 10/12/21 0301   Status Open to gravity drainage 10/12/21 0301       [REMOVED] Urethral Catheter Non-latex 16 fr (Removed)       [REMOVED] Urethral Catheter Straight-tip (Removed)   Catheter Indications Need for fluid volume management of the critically ill patient in a critical care setting 09/23/21 1050   Securement Device Date Changed 09/16/21 09/19/21 2110   Site Assessment [place] 09/23/21 1050   Urine Color Yellow 09/23/21 1050   Urine Appearance Clear 09/23/21 1050   Output (mL) 75 mL 09/23/21 0605       [REMOVED] Urethral Catheter Temperature probe;Non-latex 16 fr (Removed)   $ Urethral catheter insertion $ Not inserted for procedure 09/27/21 2807   Catheter Indications Need for fluid volume management of the critically ill patient in a critical care setting 09/27/21 0832   Securement Device Date Changed 09/26/21 09/27/21 0832   Site Assessment Pink 09/27/21 0832   Urine Color Yellow 09/27/21 0832   Urine Appearance Clear 09/27/21 0832   Urine Odor Other (Comment) 09/26/21 2030   Output (mL) 150 mL 09/27/21 0232       [REMOVED] Urethral Catheter (Removed)   $ Urethral catheter insertion $ Not inserted for procedure 10/12/21 2239   Catheter Indications Need for fluid volume management of the critically ill patient in a critical care setting 10/14/21 0941   Securement Device Date Changed 10/12/21 10/12/21 2239   Site Assessment Pink;Moist 10/14/21 0941   Urine Color Yellow 10/14/21 1358   Urine Appearance Clear 10/14/21 1358   Urine Odor Other (Comment) 10/14/21 0515   Output (mL) 450 mL 10/14/21 1358   Provider Notified to Remove No 10/12/21 2239       Findings: rectal polyp and internal hemorrhoids, otherwise normal colon    Recommendation  1. Continue supportive care  2. Monitor Hgb  3. Observe for signs of bleeding  4. IV iron therapy  5. Ok to d/c from GI standpoint  6.  Follow up with Dr. Triny Walter upon discharge    Electronically signed by Adair Saba MD on 10/22/2021 at 4:17 PM

## 2021-10-23 LAB
A/G RATIO: 1.7 (ref 1.1–2.2)
ALBUMIN SERPL-MCNC: 3.1 G/DL (ref 3.4–5)
ALP BLD-CCNC: 57 U/L (ref 40–129)
ALT SERPL-CCNC: 10 U/L (ref 10–40)
ANION GAP SERPL CALCULATED.3IONS-SCNC: 9 MMOL/L (ref 3–16)
AST SERPL-CCNC: 12 U/L (ref 15–37)
BASOPHILS ABSOLUTE: 0 K/UL (ref 0–0.2)
BASOPHILS RELATIVE PERCENT: 0.2 %
BILIRUB SERPL-MCNC: 0.3 MG/DL (ref 0–1)
BUN BLDV-MCNC: 17 MG/DL (ref 7–20)
CALCIUM SERPL-MCNC: 8.7 MG/DL (ref 8.3–10.6)
CHLORIDE BLD-SCNC: 105 MMOL/L (ref 99–110)
CO2: 28 MMOL/L (ref 21–32)
CREAT SERPL-MCNC: 0.7 MG/DL (ref 0.6–1.2)
EOSINOPHILS ABSOLUTE: 0.1 K/UL (ref 0–0.6)
EOSINOPHILS RELATIVE PERCENT: 0.9 %
GFR AFRICAN AMERICAN: >60
GFR NON-AFRICAN AMERICAN: >60
GLOBULIN: 1.8 G/DL
GLUCOSE BLD-MCNC: 86 MG/DL (ref 70–99)
HCT VFR BLD CALC: 29.4 % (ref 36–48)
HCT VFR BLD CALC: 33.2 % (ref 36–48)
HEMOGLOBIN: 10.6 G/DL (ref 12–16)
HEMOGLOBIN: 9.5 G/DL (ref 12–16)
LYMPHOCYTES ABSOLUTE: 1.2 K/UL (ref 1–5.1)
LYMPHOCYTES RELATIVE PERCENT: 18.5 %
MCH RBC QN AUTO: 30.8 PG (ref 26–34)
MCHC RBC AUTO-ENTMCNC: 32.3 G/DL (ref 31–36)
MCV RBC AUTO: 95.4 FL (ref 80–100)
MONOCYTES ABSOLUTE: 0.3 K/UL (ref 0–1.3)
MONOCYTES RELATIVE PERCENT: 5.2 %
NEUTROPHILS ABSOLUTE: 4.9 K/UL (ref 1.7–7.7)
NEUTROPHILS RELATIVE PERCENT: 75.2 %
PDW BLD-RTO: 21.2 % (ref 12.4–15.4)
PLATELET # BLD: 238 K/UL (ref 135–450)
PMV BLD AUTO: 7.8 FL (ref 5–10.5)
POTASSIUM REFLEX MAGNESIUM: 3.8 MMOL/L (ref 3.5–5.1)
RBC # BLD: 3.08 M/UL (ref 4–5.2)
SODIUM BLD-SCNC: 142 MMOL/L (ref 136–145)
TOTAL PROTEIN: 4.9 G/DL (ref 6.4–8.2)
WBC # BLD: 6.6 K/UL (ref 4–11)

## 2021-10-23 PROCEDURE — 99233 SBSQ HOSP IP/OBS HIGH 50: CPT | Performed by: INTERNAL MEDICINE

## 2021-10-23 PROCEDURE — 94761 N-INVAS EAR/PLS OXIMETRY MLT: CPT

## 2021-10-23 PROCEDURE — 6370000000 HC RX 637 (ALT 250 FOR IP): Performed by: INTERNAL MEDICINE

## 2021-10-23 PROCEDURE — 99232 SBSQ HOSP IP/OBS MODERATE 35: CPT | Performed by: INTERNAL MEDICINE

## 2021-10-23 PROCEDURE — 85025 COMPLETE CBC W/AUTO DIFF WBC: CPT

## 2021-10-23 PROCEDURE — 85018 HEMOGLOBIN: CPT

## 2021-10-23 PROCEDURE — 80053 COMPREHEN METABOLIC PANEL: CPT

## 2021-10-23 PROCEDURE — 36415 COLL VENOUS BLD VENIPUNCTURE: CPT

## 2021-10-23 PROCEDURE — 2060000000 HC ICU INTERMEDIATE R&B

## 2021-10-23 PROCEDURE — 94660 CPAP INITIATION&MGMT: CPT

## 2021-10-23 PROCEDURE — 94640 AIRWAY INHALATION TREATMENT: CPT

## 2021-10-23 PROCEDURE — 6370000000 HC RX 637 (ALT 250 FOR IP): Performed by: NURSE PRACTITIONER

## 2021-10-23 PROCEDURE — 85014 HEMATOCRIT: CPT

## 2021-10-23 PROCEDURE — 2700000000 HC OXYGEN THERAPY PER DAY

## 2021-10-23 PROCEDURE — 6370000000 HC RX 637 (ALT 250 FOR IP)

## 2021-10-23 PROCEDURE — 2580000003 HC RX 258: Performed by: INTERNAL MEDICINE

## 2021-10-23 RX ORDER — PREDNISONE 20 MG/1
20 TABLET ORAL DAILY
Status: DISCONTINUED | OUTPATIENT
Start: 2021-10-24 | End: 2021-10-26 | Stop reason: HOSPADM

## 2021-10-23 RX ADMIN — PREDNISONE 30 MG: 20 TABLET ORAL at 09:00

## 2021-10-23 RX ADMIN — BUSPIRONE HYDROCHLORIDE 10 MG: 10 TABLET ORAL at 13:46

## 2021-10-23 RX ADMIN — BUSPIRONE HYDROCHLORIDE 10 MG: 10 TABLET ORAL at 20:37

## 2021-10-23 RX ADMIN — IPRATROPIUM BROMIDE AND ALBUTEROL SULFATE 1 AMPULE: .5; 2.5 SOLUTION RESPIRATORY (INHALATION) at 07:35

## 2021-10-23 RX ADMIN — SODIUM CHLORIDE, PRESERVATIVE FREE 10 ML: 5 INJECTION INTRAVENOUS at 20:44

## 2021-10-23 RX ADMIN — SODIUM CHLORIDE, PRESERVATIVE FREE 10 ML: 5 INJECTION INTRAVENOUS at 09:01

## 2021-10-23 RX ADMIN — FERROUS SULFATE TAB 325 MG (65 MG ELEMENTAL FE) 325 MG: 325 (65 FE) TAB at 09:01

## 2021-10-23 RX ADMIN — METOPROLOL TARTRATE 25 MG: 25 TABLET, FILM COATED ORAL at 20:37

## 2021-10-23 RX ADMIN — METOPROLOL TARTRATE 25 MG: 25 TABLET, FILM COATED ORAL at 09:00

## 2021-10-23 RX ADMIN — ACETAMINOPHEN 650 MG: 325 TABLET ORAL at 16:29

## 2021-10-23 RX ADMIN — ATORVASTATIN CALCIUM 20 MG: 10 TABLET, FILM COATED ORAL at 20:37

## 2021-10-23 RX ADMIN — SERTRALINE HYDROCHLORIDE 50 MG: 50 TABLET ORAL at 09:00

## 2021-10-23 RX ADMIN — FUROSEMIDE 20 MG: 20 TABLET ORAL at 09:00

## 2021-10-23 RX ADMIN — IPRATROPIUM BROMIDE AND ALBUTEROL SULFATE 1 AMPULE: .5; 2.5 SOLUTION RESPIRATORY (INHALATION) at 14:54

## 2021-10-23 RX ADMIN — IPRATROPIUM BROMIDE AND ALBUTEROL SULFATE 1 AMPULE: .5; 2.5 SOLUTION RESPIRATORY (INHALATION) at 19:12

## 2021-10-23 RX ADMIN — PANTOPRAZOLE SODIUM 20 MG: 20 TABLET, DELAYED RELEASE ORAL at 09:01

## 2021-10-23 RX ADMIN — IPRATROPIUM BROMIDE AND ALBUTEROL SULFATE 1 AMPULE: .5; 2.5 SOLUTION RESPIRATORY (INHALATION) at 22:40

## 2021-10-23 RX ADMIN — BUSPIRONE HYDROCHLORIDE 10 MG: 10 TABLET ORAL at 09:01

## 2021-10-23 RX ADMIN — MAGNESIUM GLUCONATE 500 MG ORAL TABLET 400 MG: 500 TABLET ORAL at 09:01

## 2021-10-23 ASSESSMENT — PAIN SCALES - GENERAL
PAINLEVEL_OUTOF10: 0
PAINLEVEL_OUTOF10: 3
PAINLEVEL_OUTOF10: 0
PAINLEVEL_OUTOF10: 0

## 2021-10-23 NOTE — PROGRESS NOTES
Pt brought in Astral unit from home, placed 2nd order setting in home machine as 15/5 4L bleed. Pts current bipap settings are 16/8 35% on our V60 machine. Pt felt like she was being smothered on her current Astral Avaps settings. Pt was on home machine for approximately one hour today and will wear tonight.

## 2021-10-23 NOTE — PROGRESS NOTES
Pulmonary Progress Note  CC: Acute Hypercarbic Respiratory Failure     Subjective: Tolerated colonoscopy well    EXAM:   /72   Pulse 76   Temp 97 °F (36.1 °C) (Oral)   Resp 15   Ht 5' 1\" (1.549 m)   Wt 100 lb 8 oz (45.6 kg)   SpO2 96%   BMI 18.99 kg/m²  on 2 L  Constitutional:  No acute distress. Chronically ill appearing. HEENT: No scleral icterus  Neck: No tracheal deviation present. Cardiovascular: Normal heart sounds. No peripheral edema. Pulmonary/Chest: No wheezes. No rhonchi. No rales. + decreased breath sounds. Normal work of breathing without accessory muscle usage or stridor. Abdominal: Soft. Non-tender. Musculoskeletal: No cyanosis. No clubbing. Skin: Skin is warm and dry. Extensive bruising on all extremities.      Scheduled Meds:   furosemide  20 mg Oral See Admin Instructions    predniSONE  30 mg Oral Daily    atorvastatin  20 mg Oral Nightly    busPIRone  10 mg Oral TID    [Held by provider] clopidogrel  75 mg Oral Daily    ferrous sulfate  325 mg Oral Daily with breakfast    magnesium oxide  400 mg Oral Daily    metoprolol tartrate  25 mg Oral BID    pantoprazole  20 mg Oral Daily    polyethylene glycol  17 g Oral BID    sertraline  50 mg Oral Daily    [Held by provider] enoxaparin  40 mg SubCUTAneous Daily    ipratropium-albuterol  1 ampule Inhalation Q4H WA    sodium chloride flush  10 mL IntraVENous 2 times per day     Continuous Infusions:   sodium chloride      sodium chloride Stopped (10/20/21 1340)    dextrose       PRN Meds:  sodium chloride, melatonin, ondansetron **OR** ondansetron, polyethylene glycol, acetaminophen **OR** acetaminophen, albuterol, sodium chloride flush, sodium chloride, glucose, dextrose, glucagon (rDNA), dextrose    Labs:  CBC:   Recent Labs     10/21/21  0426 10/21/21  1757 10/22/21  0441 10/22/21  1803 10/23/21  0425   WBC 8.2  --  7.3  --  6.6   HGB 9.1*   < > 9.3* 10.6* 9.5*   HCT 27.8*   < > 28.9* 32.4* 29.4*   MCV 90.6 --  91.6  --  95.4     --  242  --  238    < > = values in this interval not displayed. BMP:   Recent Labs     10/21/21  0427 10/22/21  0441 10/23/21  0425    139 142   K 3.7 3.5 3.8    102 105   CO2 32 30 28   BUN 20 15 17   CREATININE 0.7 0.7 0.7     Cultures:  9/16/2021 SARS-CoV-2  negative  9/16/2021 influenza A & B negative  10/17/2021 Blood cultures negative    Imaging:  CXR 10/17/2021 patchy right airspace disease    ACT 10/19/21  No significant acute abdominal abnormality.  No retroperitoneal bleed.  Very   small amount of free fluid in the right pelvis of uncertain etiology and   significance.  Cholelithiasis without finding of acute cholecystitis.  Large   left and moderate right pleural effusions with bibasilar atelectasis.  Mild   anasarca. RECOMMENDATIONS:   If the patient is short of breath, ultrasound-guided thoracentesis would be   recommended for its diagnostic and therapeutic benefits. ASSESSMENT:  · Acute on chronic respiratory failure with hypoxemia and hypercapnia, baseline requirement 2.5 L O2, resolved  · Was just admitted 10/12/2021-10/14/2021 with respiratory failure, intubated during that admission  · ED 9/28/21 with respiratory failure  · Admitted 9/23/2021-9/27/2021  · Admitted 9/15/2021-9/23/2021  · Admitted 9/9/2021-9/14/2021  · Bilateral pleural effusions, L>R  · COPD  · Anemia  · Seizure like activity  · CAD  · Chronic anxiety with panic attacks  · S/P COVID-19 vaccination    PLAN:  · BiPAP HS and PRN;  on Trilogy AVAPS at [facility] and previously on Astral unit at home. 16/5 is current bipap setting.   · Plavix held 10/19/21 for left thoracentesis planned monday  · Prednisone 20 mg, tapering slowly

## 2021-10-23 NOTE — FLOWSHEET NOTE
10/23/21 0100   Vital Signs   Temp 96.5 °F (35.8 °C)   Temp Source Oral   Pulse 70   Heart Rate Source Monitor   Resp 19   /61   BP Location Left upper arm   Patient Position Semi fowlers   Level of Consciousness Alert (0)   MEWS Score 1   Oxygen Therapy   SpO2 95 %   O2 Flow Rate (L/min) 2 L/min     Pt resting in bed. Vital signs obtained. VSS. Pt requesting BIPAP to be taken off at this time. O2 placed back on. SPO2 95% on 2L NC. Pt denies any further needs at this time. Call light and bedside table within reach. Will continue to monitor.

## 2021-10-23 NOTE — PROGRESS NOTES
10/23/21 0100   RT Protocol   History Pulmonary Disease 2   Respiratory pattern 2   Breath sounds 2   Cough 1   Bronchodilator Assessment Score 7   RT Inhaler-Nebulizer Bronchodilator Protocol Note    There is a bronchodilator order in the chart from a provider indicating to follow the RT Bronchodilator Protocol and there is an Initiate RT Inhaler-Nebulizer Bronchodilator Protocol order as well (see protocol at bottom of note). CXR Findings:  No results found. The findings from the last RT Protocol Assessment were as follows:   History Pulmonary Disease: Chronic pulmonary disease  Respiratory Pattern: Dyspnea on exertion or RR 21-25 bpm  Breath Sounds: Slightly diminished and/or crackles  Cough: Strong, productive  Indication for Bronchodilator Therapy: Decreased or absent breath sounds  Bronchodilator Assessment Score: 7    Aerosolized bronchodilator medication orders have been revised according to the RT Inhaler-Nebulizer Bronchodilator Protocol below. Respiratory Therapist to perform RT Therapy Protocol Assessment initially then follow the protocol. Repeat RT Therapy Protocol Assessment PRN for score 0-3 or on second treatment, BID, and PRN for scores above 3. No Indications  adjust the frequency to every 6 hours PRN wheezing or bronchospasm, if no treatments needed after 48 hours then discontinue using Per Protocol order mode. If indication present, adjust the RT bronchodilator orders based on the Bronchodilator Assessment Score as indicated below. Use Inhaler orders unless patient has one or more of the following: on home nebulizer, not able to hold breath for 10 seconds, is not alert and oriented, cannot activate and use MDI correctly, or respiratory rate 25 breaths per minute or more, then use the equivalent nebulizer order(s) with same Frequency and PRN reasons based on the score.   If a patient is on this medication at home then do not decrease Frequency below that used at home.    0-3  enter or revise RT bronchodilator order(s) to equivalent RT Bronchodilator order with Frequency of every 4 hours PRN for wheezing or increased work of breathing using Per Protocol order mode. 4-6  enter or revise RT Bronchodilator order(s) to two equivalent RT bronchodilator orders with one order with BID Frequency and one order with Frequency of every 4 hours PRN wheezing or increased work of breathing using Per Protocol order mode. 7-10  enter or revise RT Bronchodilator order(s) to two equivalent RT bronchodilator orders with one order with TID Frequency and one order with Frequency of every 4 hours PRN wheezing or increased work of breathing using Per Protocol order mode. 11-13  enter or revise RT Bronchodilator order(s) to one equivalent RT bronchodilator order with QID Frequency and an Albuterol order with Frequency of every 4 hours PRN wheezing or increased work of breathing using Per Protocol order mode. Greater than 13  enter or revise RT Bronchodilator order(s) to one equivalent RT bronchodilator order with every 4 hours Frequency and an Albuterol order with Frequency of every 2 hours PRN wheezing or increased work of breathing using Per Protocol order mode.      Electronically signed by Deepak Ansari RCP on 10/23/2021 at 1:03 AM

## 2021-10-23 NOTE — OP NOTE
[address]                 [address] [facility], [place] [address]                                OPERATIVE REPORT    PATIENT NAME: Angela Renner                 :        1940  MED REC NO:   [id number]                          ROOM:         ACCOUNT NO:   [de-identified]                           ADMIT DATE: 10/17/2021  PROVIDER:     Luke Hines MD    DATE OF PROCEDURE:  10/22/2021    PRE-PROCEDURE DIAGNOSES:  1. Severe anemia. 2.  Weakness. PROCEDURE:  Colonoscopy to cecum with snare polypectomy. POSTPROCEDURE DIAGNOSES:  1. Single rectal polyp. 2.  Internal hemorrhoids. 3.  Otherwise normal colon to cecum. PROCEDURE INDICATIONS:  This is an [de-identified] female with complex  history of hypertension, hyperlipidemia, COPD, acute kidney injury,  asthma, anxiety, GERD, obstructive sleep apnea and osteoarthritis,  admitted with acute respiratory failure. She has known history of iron  deficiency anemia. Previous attempts of colonoscopy were unsuccessful  due to poor prep. However, she did have sigmoidoscopy with hemorrhoid  banding recently. Her EGD in 2021 was negative. Repeat colonoscopy  is being performed to rule out high-risk pathology. MEDICATIONS:  MAC per Anesthesia. PROCEDURE DETAILS:  Informed consent was obtained after discussing  risks, benefits, and alternatives. Full history and physical was  performed. The patient was classified as ASA class III. Medications  were given by Anesthesia. Cardiopulmonary status was continuously  monitored throughout the procedure. The patient was placed in left  lateral decubitus position. Once adequately sedated, a standard  pediatric colonoscope was inserted in the anus and advanced to the cecum  identified by landmarks of appendiceal orifice and IC valve.   The entire  mucosa of the cecum, ascending colon, transverse colon, descending  colon, sigmoid colon, and rectum were examined carefully during  withdrawal.  The patient tolerated the procedure well without any  difficulties. The colon prep was good. FINDINGS:    RECTUM:  Digital rectal exam was normal.  No masses were felt. COLON:  There was a single sessile 5 mm polyp in the rectum. This was  completely resected and retrieved using snare polypectomy method. Remaining examined colon mucosa appeared normal and healthy without any  evidence of inflammation, ulcers, pseudomembranes, or masses. Retroflexed view of the rectum showed small internal hemorrhoids. SUMMARY:  1. Single rectal polyp. 2.  Internal hemorrhoids. 3.  Otherwise normal colon to cecum. 4.  No explanation for anemia on this exam.    RECOMMENDATIONS:  1. Discharge the patient to home when standard parameters are met. 2.  Await pathology results. 3.  Monitor hemoglobin. 4.  Observe for signs of bleeding. 5.  IV iron therapy. 6.  Repeat CBC in 4 weeks upon discharge. 7.  Follow up with Dr. Nisha Ceja as needed. 8.  We will sign off, please call with questions.     EBL: <5mL    Pato Baig MD    D: 10/22/2021 16:22:00       T: 10/22/2021 16:24:20     [email or web address]  Job#: [id number]     Doc#: [id number]    CC:  MD Rut Driver MD

## 2021-10-23 NOTE — PROGRESS NOTES
Progress Note    Admit Date:  10/17/2021    Admitted with acute on chronic respiratory failure with hypoxia and hypercarbia  Multiple admissions over last 2 months   improved with bipap     +occult blood, GI - S/P colonoscopy on Friday. Single rectal polyp removed           Subjective:  10/23  Currently on 2 L of oxygen stable   Plan for thoracentesis on monday   Holding Plavix for thoracentesis as well    drop in H/H . Monitored   S/p c scope     Objective:    Patient Vitals for the past 4 hrs:   BP Temp Temp src Pulse Resp SpO2   10/23/21 0900 119/72 97 °F (36.1 °C) Oral 76 15 96 %            Intake/Output Summary (Last 24 hours) at 10/23/2021 1259  Last data filed at 10/23/2021 0900  Gross per 24 hour   Intake 700 ml   Output 300 ml   Net 400 ml       Physical Exam:    Gen: No distress. Alert. Appears chronically frail and chronically ill   awake, alert, oriented  Eyes: PERRL. No sclera icterus. No conjunctival injection. ENT: No discharge. Pharynx clear. Neck: No JVD. Trachea midline. Resp: No accessory muscle use. Diminished throughout. No crackles. No wheezes. No rhonchi. CV: Regular rate. Regular rhythm. No murmur. No rub. No edema. Capillary Refill: Brisk,< 3 seconds   Peripheral Pulses: +2 palpable, equal bilaterally   GI: Non-tender. Non-distended. Normal bowel sounds. Skin: Warm and dry. No nodule on exposed extremities. No rash on exposed extremities. Scattered ecchymosis on BUE and BLE Left wrist wrapped with kerlex  M/S: No cyanosis. No joint deformity. No clubbing. Neuro: Awake.  Grossly nonfocal    Psych: Oriented x 3. +anxiety      Scheduled Meds:   furosemide  20 mg Oral See Admin Instructions    predniSONE  30 mg Oral Daily    atorvastatin  20 mg Oral Nightly    busPIRone  10 mg Oral TID    [Held by provider] clopidogrel  75 mg Oral Daily    ferrous sulfate  325 mg Oral Daily with breakfast    magnesium oxide  400 mg Oral Daily    metoprolol tartrate  25 mg Oral BID    pantoprazole  20 mg Oral Daily    polyethylene glycol  17 g Oral BID    sertraline  50 mg Oral Daily    [Held by provider] enoxaparin  40 mg SubCUTAneous Daily    ipratropium-albuterol  1 ampule Inhalation Q4H WA    sodium chloride flush  10 mL IntraVENous 2 times per day       Continuous Infusions:   sodium chloride      sodium chloride Stopped (10/20/21 1340)    dextrose         PRN Meds:  sodium chloride, melatonin, ondansetron **OR** ondansetron, polyethylene glycol, acetaminophen **OR** acetaminophen, albuterol, sodium chloride flush, sodium chloride, glucose, dextrose, glucagon (rDNA), dextrose      Data:  CBC:   Recent Labs     10/21/21  0426 10/21/21  1757 10/22/21  0441 10/22/21  1803 10/23/21  0425   WBC 8.2  --  7.3  --  6.6   HGB 9.1*   < > 9.3* 10.6* 9.5*   HCT 27.8*   < > 28.9* 32.4* 29.4*   MCV 90.6  --  91.6  --  95.4     --  242  --  238    < > = values in this interval not displayed. BMP:   Recent Labs     10/21/21  0427 10/22/21  0441 10/23/21  0425    139 142   K 3.7 3.5 3.8    102 105   CO2 32 30 28   BUN 20 15 17   CREATININE 0.7 0.7 0.7     LIVER PROFILE:   Recent Labs     10/21/21  0427 10/22/21  0441 10/23/21  0425   AST 12* 13* 12*   ALT 10 8* 10   BILITOT 0.9 0.5 0.3   ALKPHOS 61 60 57       CULTURES  COVID: negative   Blood cx x2: NGTD     RADIOLOGY  CT ABDOMEN PELVIS WO CONTRAST Additional Contrast? None   Final Result   No significant acute abdominal abnormality. No retroperitoneal bleed. Very   small amount of free fluid in the right pelvis of uncertain etiology and   significance. Cholelithiasis without finding of acute cholecystitis. Large   left and moderate right pleural effusions with bibasilar atelectasis. Mild   anasarca. RECOMMENDATIONS:   If the patient is short of breath, ultrasound-guided thoracentesis would be   recommended for its diagnostic and therapeutic benefits. XR CHEST PORTABLE   Final Result   1.  Patchy airspace disease, right perihilar region, suggesting pneumonia or   atelectasis   2. Small pleural effusions   3. Improved aeration present within the left lung base, consistent with   resolving atelectasis and or pneumonia   4. Underlying interstitial pulmonary fibrosis               Assessment/Plan:    Acute on chronic hypoxic and hypercarbic respiratory failure. Due to  severe COPD with exacerbation   left pleural effusion   - seen by pulm   -BiPAP at night. Currently on 2 L of oxygen. previously on AVAPS with home Trilogy.   -Continue prednisone 40 mg daily.  -was given  cefepime and vancomycin day #4. And doxycycline day #3 , however pt has been on multiple rounds of abx recently and no clear evidence of pna this time   - Wbc stable. Will stop abx   - left pleural effusion - ongoing diuresis, planned for thoracocentesis on Monday. holding Plavix    resumed PO lasix - patient only takes on Tuesday and Fridays at home. Will reorder      Acute on chronic anemia  -Her hemoglobin is 8.1 on 10/13/2021  10.3--> 7.5--> 7.6 -->6.7-->7.6-->7.9--6.7  - given 1 unit PRBC on 10/20-->stable at 9.3 today  -Recent iron studies shows anemia of iron deficiency and chronic disease  -CT abd/pelvis   -Hemoccult stools - positive   -She had a colonoscopy 2/2020-showed hemorrhoids-Dr. Oliverio Monreal  -Holding Plavix and Lovenox  for now  - GI consulted,   S/P c scope on 10/22 . 1. Single rectal polyp. Removed   2. Internal hemorrhoids. 3.  Otherwise normal colon to cecum.      Hypokalemia -resolved  - replacement ordered  - monitor     Hypomagnesemia  - 1.4, 2 gm replacement ordered  -monitor  - repeat ordered and stable        #CAD  #HTN  - cont lopressor, statin  - holding plavix 2 to drop in H/H     #GERD  - cont PPI    # severe anxiety  - cont home med     #Severe malnutrition  #Generalized weakness and debility      DVT Prophylaxis: scds  Diet: ADULT ORAL NUTRITION SUPPLEMENT; Breakfast, Lunch, Dinner; Clear Liquid Oral Supplement  ADULT DIET; Regular  Code Status: Full Code           Parris Noble MD,10/23/2021 12:58 PM

## 2021-10-23 NOTE — PROGRESS NOTES
Pt placed back on BIPAP. Pt resting in bed with eyes closed. Pt denies any needs at this time. Call light and bedside table within reach. Will continue to monitor.

## 2021-10-23 NOTE — PROGRESS NOTES
Pt wore bipap from approximately 810am 10/22-procedure 10/22. After procedure until 1700. Then off and on every couple of hours. Placed pt on 2L NC at 740am 10/23.

## 2021-10-23 NOTE — ANESTHESIA POSTPROCEDURE EVALUATION
Department of Anesthesiology  Postprocedure Note    Patient: Amadou Patel  MRN: [id number]  YOB: 1940  Date of evaluation: 10/23/2021  Time:  4:13 PM     Procedure Summary     Date: 10/22/21 Room / Location: [facility] ENDO 01 / [facility]    Anesthesia Start: 6568 Anesthesia Stop: 1600    Procedure: COLONOSCOPY POLYPECTOMY SNARE/COLD BIOPSY (N/A ) Diagnosis: (ANEMIA)    Surgeons: Katie Rodriguez MD Responsible Provider: Pao Molina MD    Anesthesia Type: MAC ASA Status: 4          Anesthesia Type: MAC    David Phase I: David Score: 9    David Phase II: David Score: 9    Last vitals: Reviewed and per EMR flowsheets.        Anesthesia Post Evaluation    Comments: Postoperative Anesthesia Note    Name:    Amadou Patel  MRN:      [id number]    Patient Vitals in the past 12 hrs:  10/23/21 0900, BP:119/72, Temp:97 °F (36.1 °C), Temp src:Oral, Pulse:76, Resp:15, SpO2:96 %  10/23/21 0741, SpO2:98 %  10/23/21 0730, SpO2:99 %  10/23/21 0648, Weight:100 lb 8 oz (45.6 kg)     LABS:    CBC  Lab Results       Component                Value               Date/Time                  WBC                      6.6                 10/23/2021 04:25 AM        HGB                      9.5 (L)             10/23/2021 04:25 AM        HCT                      29.4 (L)            10/23/2021 04:25 AM        PLT                      238                 10/23/2021 04:25 AM   RENAL  Lab Results       Component                Value               Date/Time                  NA                       142                 10/23/2021 04:25 AM        K                        3.8                 10/23/2021 04:25 AM        CL                       105                 10/23/2021 04:25 AM        CO2                      28                  10/23/2021 04:25 AM        BUN                      17                  10/23/2021 04:25 AM        CREATININE               0.7                 10/23/2021 04:25 AM        GLUCOSE                  86                  10/23/2021 04:25 AM   COAGS  Lab Results       Component                Value               Date/Time                  PROTIME                  10.3                10/17/2021 06:05 PM        INR                      0.91                10/17/2021 06:05 PM        APTT                     30.4                09/28/2021 07:06 AM     Intake & Output:  In: 240 (P.O.:240)  Out: 300 (Urine:300)    Nausea & Vomiting:  No    Level of Consciousness:  Awake    Pain Assessment:  Adequate analgesia    Anesthesia Complications:  No apparent anesthetic complications    SUMMARY      Vital signs stable  OK to discharge from Stage I post anesthesia care.   Care transferred from Anesthesiology department on discharge from perioperative area

## 2021-10-23 NOTE — PROGRESS NOTES
Report given to [facility]. Pt. Stable. Care transferred at this time.

## 2021-10-23 NOTE — PROGRESS NOTES
10/22/21 2243   NIV Type   Skin Assessment Clean, dry, & intact   Skin Protection for O2 Device Yes   Equipment Type v60   Mode Bilevel   Mask Type Full face mask   Mask Size Small   Settings/Measurements   IPAP 16 cmH20   CPAP/EPAP 8 cmH2O   Rate Ordered 16   Resp 23   FiO2  35 %   Vt Exhaled 414 mL   Minute Volume 8.9 Liters   Mask Leak (lpm) 12 lpm   Comfort Level Good   Using Accessory Muscles No   SpO2 98   Patient Observation   Observations spo2 98% on 35% bipap

## 2021-10-24 LAB
A/G RATIO: 1.9 (ref 1.1–2.2)
ALBUMIN SERPL-MCNC: 3.2 G/DL (ref 3.4–5)
ALP BLD-CCNC: 60 U/L (ref 40–129)
ALT SERPL-CCNC: 9 U/L (ref 10–40)
ANION GAP SERPL CALCULATED.3IONS-SCNC: 12 MMOL/L (ref 3–16)
AST SERPL-CCNC: 11 U/L (ref 15–37)
BASOPHILS ABSOLUTE: 0 K/UL (ref 0–0.2)
BASOPHILS RELATIVE PERCENT: 0.1 %
BILIRUB SERPL-MCNC: 0.3 MG/DL (ref 0–1)
BUN BLDV-MCNC: 20 MG/DL (ref 7–20)
CALCIUM SERPL-MCNC: 8.3 MG/DL (ref 8.3–10.6)
CHLORIDE BLD-SCNC: 106 MMOL/L (ref 99–110)
CO2: 27 MMOL/L (ref 21–32)
CREAT SERPL-MCNC: 0.6 MG/DL (ref 0.6–1.2)
EOSINOPHILS ABSOLUTE: 0 K/UL (ref 0–0.6)
EOSINOPHILS RELATIVE PERCENT: 0.3 %
GFR AFRICAN AMERICAN: >60
GFR NON-AFRICAN AMERICAN: >60
GLOBULIN: 1.7 G/DL
GLUCOSE BLD-MCNC: 132 MG/DL (ref 70–99)
HCT VFR BLD CALC: 29.2 % (ref 36–48)
HCT VFR BLD CALC: 31.1 % (ref 36–48)
HEMOGLOBIN: 10 G/DL (ref 12–16)
HEMOGLOBIN: 9.3 G/DL (ref 12–16)
LYMPHOCYTES ABSOLUTE: 1.4 K/UL (ref 1–5.1)
LYMPHOCYTES RELATIVE PERCENT: 22.2 %
MAGNESIUM: 1.7 MG/DL (ref 1.8–2.4)
MCH RBC QN AUTO: 30.1 PG (ref 26–34)
MCHC RBC AUTO-ENTMCNC: 31.9 G/DL (ref 31–36)
MCV RBC AUTO: 94.3 FL (ref 80–100)
MONOCYTES ABSOLUTE: 0.3 K/UL (ref 0–1.3)
MONOCYTES RELATIVE PERCENT: 4.7 %
NEUTROPHILS ABSOLUTE: 4.6 K/UL (ref 1.7–7.7)
NEUTROPHILS RELATIVE PERCENT: 72.7 %
PDW BLD-RTO: 21.1 % (ref 12.4–15.4)
PLATELET # BLD: 225 K/UL (ref 135–450)
PMV BLD AUTO: 8 FL (ref 5–10.5)
POTASSIUM REFLEX MAGNESIUM: 3.4 MMOL/L (ref 3.5–5.1)
RBC # BLD: 3.1 M/UL (ref 4–5.2)
SODIUM BLD-SCNC: 145 MMOL/L (ref 136–145)
TOTAL PROTEIN: 4.9 G/DL (ref 6.4–8.2)
WBC # BLD: 6.4 K/UL (ref 4–11)

## 2021-10-24 PROCEDURE — 2060000000 HC ICU INTERMEDIATE R&B

## 2021-10-24 PROCEDURE — 6370000000 HC RX 637 (ALT 250 FOR IP): Performed by: INTERNAL MEDICINE

## 2021-10-24 PROCEDURE — 83735 ASSAY OF MAGNESIUM: CPT

## 2021-10-24 PROCEDURE — 36415 COLL VENOUS BLD VENIPUNCTURE: CPT

## 2021-10-24 PROCEDURE — 2580000003 HC RX 258: Performed by: INTERNAL MEDICINE

## 2021-10-24 PROCEDURE — 6370000000 HC RX 637 (ALT 250 FOR IP): Performed by: NURSE PRACTITIONER

## 2021-10-24 PROCEDURE — 99232 SBSQ HOSP IP/OBS MODERATE 35: CPT | Performed by: INTERNAL MEDICINE

## 2021-10-24 PROCEDURE — 94640 AIRWAY INHALATION TREATMENT: CPT

## 2021-10-24 PROCEDURE — 85014 HEMATOCRIT: CPT

## 2021-10-24 PROCEDURE — 85025 COMPLETE CBC W/AUTO DIFF WBC: CPT

## 2021-10-24 PROCEDURE — 2700000000 HC OXYGEN THERAPY PER DAY

## 2021-10-24 PROCEDURE — 94761 N-INVAS EAR/PLS OXIMETRY MLT: CPT

## 2021-10-24 PROCEDURE — 85018 HEMOGLOBIN: CPT

## 2021-10-24 PROCEDURE — 6360000002 HC RX W HCPCS: Performed by: INTERNAL MEDICINE

## 2021-10-24 PROCEDURE — 99233 SBSQ HOSP IP/OBS HIGH 50: CPT | Performed by: INTERNAL MEDICINE

## 2021-10-24 PROCEDURE — 80053 COMPREHEN METABOLIC PANEL: CPT

## 2021-10-24 RX ORDER — POTASSIUM CHLORIDE 750 MG/1
40 TABLET, EXTENDED RELEASE ORAL DAILY
Status: DISCONTINUED | OUTPATIENT
Start: 2021-10-24 | End: 2021-10-26 | Stop reason: HOSPADM

## 2021-10-24 RX ADMIN — BUSPIRONE HYDROCHLORIDE 10 MG: 10 TABLET ORAL at 09:03

## 2021-10-24 RX ADMIN — SERTRALINE HYDROCHLORIDE 50 MG: 50 TABLET ORAL at 09:03

## 2021-10-24 RX ADMIN — FUROSEMIDE 20 MG: 20 TABLET ORAL at 09:03

## 2021-10-24 RX ADMIN — IPRATROPIUM BROMIDE AND ALBUTEROL SULFATE 1 AMPULE: .5; 2.5 SOLUTION RESPIRATORY (INHALATION) at 05:13

## 2021-10-24 RX ADMIN — METOPROLOL TARTRATE 25 MG: 25 TABLET, FILM COATED ORAL at 20:49

## 2021-10-24 RX ADMIN — PREDNISONE 20 MG: 20 TABLET ORAL at 09:02

## 2021-10-24 RX ADMIN — PANTOPRAZOLE SODIUM 20 MG: 20 TABLET, DELAYED RELEASE ORAL at 09:03

## 2021-10-24 RX ADMIN — MAGNESIUM GLUCONATE 500 MG ORAL TABLET 400 MG: 500 TABLET ORAL at 09:03

## 2021-10-24 RX ADMIN — ONDANSETRON HYDROCHLORIDE 4 MG: 2 INJECTION, SOLUTION INTRAMUSCULAR; INTRAVENOUS at 17:58

## 2021-10-24 RX ADMIN — POTASSIUM CHLORIDE 40 MEQ: 750 TABLET, EXTENDED RELEASE ORAL at 09:03

## 2021-10-24 RX ADMIN — IPRATROPIUM BROMIDE AND ALBUTEROL SULFATE 1 AMPULE: .5; 2.5 SOLUTION RESPIRATORY (INHALATION) at 11:19

## 2021-10-24 RX ADMIN — METOPROLOL TARTRATE 25 MG: 25 TABLET, FILM COATED ORAL at 09:03

## 2021-10-24 RX ADMIN — ACETAMINOPHEN 650 MG: 325 TABLET ORAL at 04:14

## 2021-10-24 RX ADMIN — ATORVASTATIN CALCIUM 20 MG: 10 TABLET, FILM COATED ORAL at 20:49

## 2021-10-24 RX ADMIN — SODIUM CHLORIDE, PRESERVATIVE FREE 10 ML: 5 INJECTION INTRAVENOUS at 21:01

## 2021-10-24 RX ADMIN — BUSPIRONE HYDROCHLORIDE 10 MG: 10 TABLET ORAL at 13:32

## 2021-10-24 RX ADMIN — FERROUS SULFATE TAB 325 MG (65 MG ELEMENTAL FE) 325 MG: 325 (65 FE) TAB at 09:03

## 2021-10-24 RX ADMIN — IPRATROPIUM BROMIDE AND ALBUTEROL SULFATE 1 AMPULE: .5; 2.5 SOLUTION RESPIRATORY (INHALATION) at 18:44

## 2021-10-24 RX ADMIN — IPRATROPIUM BROMIDE AND ALBUTEROL SULFATE 1 AMPULE: .5; 2.5 SOLUTION RESPIRATORY (INHALATION) at 22:46

## 2021-10-24 RX ADMIN — SODIUM CHLORIDE, PRESERVATIVE FREE 10 ML: 5 INJECTION INTRAVENOUS at 09:03

## 2021-10-24 RX ADMIN — ACETAMINOPHEN 650 MG: 325 TABLET ORAL at 10:30

## 2021-10-24 RX ADMIN — IPRATROPIUM BROMIDE AND ALBUTEROL SULFATE 1 AMPULE: .5; 2.5 SOLUTION RESPIRATORY (INHALATION) at 15:43

## 2021-10-24 RX ADMIN — BUSPIRONE HYDROCHLORIDE 10 MG: 10 TABLET ORAL at 20:49

## 2021-10-24 RX ADMIN — ONDANSETRON 4 MG: 4 TABLET, ORALLY DISINTEGRATING ORAL at 05:37

## 2021-10-24 RX ADMIN — MAGNESIUM GLUCONATE 500 MG ORAL TABLET 400 MG: 500 TABLET ORAL at 20:49

## 2021-10-24 ASSESSMENT — PAIN SCALES - GENERAL
PAINLEVEL_OUTOF10: 0
PAINLEVEL_OUTOF10: 5
PAINLEVEL_OUTOF10: 0
PAINLEVEL_OUTOF10: 3

## 2021-10-24 NOTE — PROGRESS NOTES
RT Inhaler-Nebulizer Bronchodilator Protocol Note    There is a bronchodilator order in the chart from a provider indicating to follow the RT Bronchodilator Protocol and there is an Initiate RT Inhaler-Nebulizer Bronchodilator Protocol order as well (see protocol at bottom of note). CXR Findings:  No results found. The findings from the last RT Protocol Assessment were as follows:   History Pulmonary Disease: Chronic pulmonary disease  Respiratory Pattern: Dyspnea on exertion or RR 21-25 bpm  Breath Sounds: Slightly diminished and/or crackles  Cough: Strong, productive  Indication for Bronchodilator Therapy: Decreased or absent breath sounds  Bronchodilator Assessment Score: 7    Aerosolized bronchodilator medication orders have been revised according to the RT Inhaler-Nebulizer Bronchodilator Protocol below. Respiratory Therapist to perform RT Therapy Protocol Assessment initially then follow the protocol. Repeat RT Therapy Protocol Assessment PRN for score 0-3 or on second treatment, BID, and PRN for scores above 3. No Indications  adjust the frequency to every 6 hours PRN wheezing or bronchospasm, if no treatments needed after 48 hours then discontinue using Per Protocol order mode. If indication present, adjust the RT bronchodilator orders based on the Bronchodilator Assessment Score as indicated below. Use Inhaler orders unless patient has one or more of the following: on home nebulizer, not able to hold breath for 10 seconds, is not alert and oriented, cannot activate and use MDI correctly, or respiratory rate 25 breaths per minute or more, then use the equivalent nebulizer order(s) with same Frequency and PRN reasons based on the score. If a patient is on this medication at home then do not decrease Frequency below that used at home.     0-3  enter or revise RT bronchodilator order(s) to equivalent RT Bronchodilator order with Frequency of every 4 hours PRN for wheezing or increased work of breathing using Per Protocol order mode. 4-6  enter or revise RT Bronchodilator order(s) to two equivalent RT bronchodilator orders with one order with BID Frequency and one order with Frequency of every 4 hours PRN wheezing or increased work of breathing using Per Protocol order mode. 7-10  enter or revise RT Bronchodilator order(s) to two equivalent RT bronchodilator orders with one order with TID Frequency and one order with Frequency of every 4 hours PRN wheezing or increased work of breathing using Per Protocol order mode. 11-13  enter or revise RT Bronchodilator order(s) to one equivalent RT bronchodilator order with QID Frequency and an Albuterol order with Frequency of every 4 hours PRN wheezing or increased work of breathing using Per Protocol order mode. Greater than 13  enter or revise RT Bronchodilator order(s) to one equivalent RT bronchodilator order with every 4 hours Frequency and an Albuterol order with Frequency of every 2 hours PRN wheezing or increased work of breathing using Per Protocol order mode. RT to enter RT Home Evaluation for COPD & MDI Assessment order using Per Protocol order mode.     Electronically signed by Azalia Martinez RCP on 10/24/2021 at 11:23 AM

## 2021-10-24 NOTE — FLOWSHEET NOTE
10/24/21 0149   Vital Signs   Temp 97.2 °F (36.2 °C)   Temp Source Oral   Pulse 87   Heart Rate Source Monitor   Resp 16   /60   BP Location Right upper arm   Patient Position Semi fowlers   Level of Consciousness Alert (0)   MEWS Score 1   Patient Currently in Pain Denies   Oxygen Therapy   SpO2 95 %   Pulse Oximeter Device Mode Continuous   Pulse Oximeter Device Location Left;Finger   O2 Device PAP (positive airway pressure)     Pt is resting with eyes closed. No s/s of distress. Pt denies any further needs at this time. Bed locked and in lowest position. Call light and bedside table within reach. Will continue to monitor.

## 2021-10-24 NOTE — PROGRESS NOTES
10/23/21 2100   RT Protocol   History Pulmonary Disease 2   Respiratory pattern 2   Breath sounds 2   Cough 1   Bronchodilator Assessment Score 7   RT Inhaler-Nebulizer Bronchodilator Protocol Note    There is a bronchodilator order in the chart from a provider indicating to follow the RT Bronchodilator Protocol and there is an Initiate RT Inhaler-Nebulizer Bronchodilator Protocol order as well (see protocol at bottom of note). CXR Findings:  No results found. The findings from the last RT Protocol Assessment were as follows:   History Pulmonary Disease: Chronic pulmonary disease  Respiratory Pattern: Dyspnea on exertion or RR 21-25 bpm  Breath Sounds: Slightly diminished and/or crackles  Cough: Strong, productive  Indication for Bronchodilator Therapy: Decreased or absent breath sounds  Bronchodilator Assessment Score: 7    Aerosolized bronchodilator medication orders have been revised according to the RT Inhaler-Nebulizer Bronchodilator Protocol below. Respiratory Therapist to perform RT Therapy Protocol Assessment initially then follow the protocol. Repeat RT Therapy Protocol Assessment PRN for score 0-3 or on second treatment, BID, and PRN for scores above 3. No Indications  adjust the frequency to every 6 hours PRN wheezing or bronchospasm, if no treatments needed after 48 hours then discontinue using Per Protocol order mode. If indication present, adjust the RT bronchodilator orders based on the Bronchodilator Assessment Score as indicated below. Use Inhaler orders unless patient has one or more of the following: on home nebulizer, not able to hold breath for 10 seconds, is not alert and oriented, cannot activate and use MDI correctly, or respiratory rate 25 breaths per minute or more, then use the equivalent nebulizer order(s) with same Frequency and PRN reasons based on the score.   If a patient is on this medication at home then do not decrease Frequency below that used at home.    0-3  enter or revise RT bronchodilator order(s) to equivalent RT Bronchodilator order with Frequency of every 4 hours PRN for wheezing or increased work of breathing using Per Protocol order mode. 4-6  enter or revise RT Bronchodilator order(s) to two equivalent RT bronchodilator orders with one order with BID Frequency and one order with Frequency of every 4 hours PRN wheezing or increased work of breathing using Per Protocol order mode. 7-10  enter or revise RT Bronchodilator order(s) to two equivalent RT bronchodilator orders with one order with TID Frequency and one order with Frequency of every 4 hours PRN wheezing or increased work of breathing using Per Protocol order mode. 11-13  enter or revise RT Bronchodilator order(s) to one equivalent RT bronchodilator order with QID Frequency and an Albuterol order with Frequency of every 4 hours PRN wheezing or increased work of breathing using Per Protocol order mode. Greater than 13  enter or revise RT Bronchodilator order(s) to one equivalent RT bronchodilator order with every 4 hours Frequency and an Albuterol order with Frequency of every 2 hours PRN wheezing or increased work of breathing using Per Protocol order mode.      Electronically signed by Andres Castellon RCP on 10/23/2021 at 9:23 PM

## 2021-10-24 NOTE — PROGRESS NOTES
Progress Note    Admit Date:  10/17/2021    Admitted with acute on chronic respiratory failure with hypoxia and hypercarbia  Multiple admissions over last 2 months   improved with bipap     +occult blood, GI - S/P colonoscopy on Friday. Single rectal polyp removed     Subjective:    10/24  Patient is doing well. no complaints,  slept well last night. O2 sat stable on 2L . Plan is for left thoracentesis in a.m. Objective:    Patient Vitals for the past 4 hrs:   BP Temp Temp src Pulse Resp SpO2   10/24/21 1120     18 98 %   10/24/21 0900 122/69 97 °F (36.1 °C) Oral 90 19 97 %            Intake/Output Summary (Last 24 hours) at 10/24/2021 1221  Last data filed at 10/24/2021 0916  Gross per 24 hour   Intake 540 ml   Output 650 ml   Net -110 ml       Physical Exam:    Gen: No distress. Alert. Appears chronically frail and chronically ill   awake, alert, oriented  Eyes: PERRL. No sclera icterus. No conjunctival injection. ENT: No discharge. Pharynx clear. Neck: No JVD. Trachea midline. Resp: No accessory muscle use. Diminished throughout. No crackles. No wheezes. No rhonchi. CV: Regular rate. Regular rhythm. No murmur. No rub. No edema. Capillary Refill: Brisk,< 3 seconds   Peripheral Pulses: +2 palpable, equal bilaterally   GI: Non-tender. Non-distended. Normal bowel sounds. Skin: Warm and dry. No nodule on exposed extremities. No rash on exposed extremities. Scattered ecchymosis on BUE and BLE Left wrist wrapped with kerlex  M/S: No cyanosis. No joint deformity. No clubbing. Neuro: Awake.  Grossly nonfocal    Psych: Oriented x 3. +anxiety      Scheduled Meds:   potassium chloride  40 mEq Oral Daily    magnesium oxide  400 mg Oral BID    predniSONE  20 mg Oral Daily    furosemide  20 mg Oral See Admin Instructions    atorvastatin  20 mg Oral Nightly    busPIRone  10 mg Oral TID    [Held by provider] clopidogrel  75 mg Oral Daily    ferrous sulfate  325 mg Oral Daily with breakfast    metoprolol tartrate  25 mg Oral BID    pantoprazole  20 mg Oral Daily    polyethylene glycol  17 g Oral BID    sertraline  50 mg Oral Daily    [Held by provider] enoxaparin  40 mg SubCUTAneous Daily    ipratropium-albuterol  1 ampule Inhalation Q4H WA    sodium chloride flush  10 mL IntraVENous 2 times per day       Continuous Infusions:   sodium chloride      sodium chloride Stopped (10/20/21 1340)    dextrose         PRN Meds:  sodium chloride, melatonin, ondansetron **OR** ondansetron, polyethylene glycol, acetaminophen **OR** acetaminophen, albuterol, sodium chloride flush, sodium chloride, glucose, dextrose, glucagon (rDNA), dextrose      Data:  CBC:   Recent Labs     10/22/21  0441 10/22/21  1803 10/23/21  0425 10/23/21  1859 10/24/21  0429   WBC 7.3  --  6.6  --  6.4   HGB 9.3*   < > 9.5* 10.6* 9.3*   HCT 28.9*   < > 29.4* 33.2* 29.2*   MCV 91.6  --  95.4  --  94.3     --  238  --  225    < > = values in this interval not displayed. BMP:   Recent Labs     10/22/21  0441 10/23/21  0425 10/24/21  0429    142 145   K 3.5 3.8 3.4*    105 106   CO2 30 28 27   BUN 15 17 20   CREATININE 0.7 0.7 0.6     LIVER PROFILE:   Recent Labs     10/22/21  0441 10/23/21  0425 10/24/21  0429   AST 13* 12* 11*   ALT 8* 10 9*   BILITOT 0.5 0.3 0.3   ALKPHOS 60 62 60       CULTURES  COVID: negative   Blood cx x2: NGTD     RADIOLOGY  CT ABDOMEN PELVIS WO CONTRAST Additional Contrast? None   Final Result   No significant acute abdominal abnormality. No retroperitoneal bleed. Very   small amount of free fluid in the right pelvis of uncertain etiology and   significance. Cholelithiasis without finding of acute cholecystitis. Large   left and moderate right pleural effusions with bibasilar atelectasis. Mild   anasarca. RECOMMENDATIONS:   If the patient is short of breath, ultrasound-guided thoracentesis would be   recommended for its diagnostic and therapeutic benefits.          XR CHEST PORTABLE weakness and debility  PT OT    DVT Prophylaxis: scds  Diet: ADULT ORAL NUTRITION SUPPLEMENT; Breakfast, Lunch, Dinner; Clear Liquid Oral Supplement  ADULT DIET; Regular  Code Status: Full Code     Plan is for thoracentesis in a.m.       Louis Correa MD,10/24/2021 12:21 PM

## 2021-10-24 NOTE — FLOWSHEET NOTE
10/23/21 1948   Vital Signs   Temp 97.3 °F (36.3 °C)   Temp Source Oral   Pulse 102   Heart Rate Source Monitor   Resp 18   /63   BP Location Left upper arm   Level of Consciousness Alert (0)   MEWS Score 2   Oxygen Therapy   SpO2 97 %   O2 Device Nasal cannula   O2 Flow Rate (L/min) 2 L/min     Shift assessment complete. SPO2 95% on 2L NC. Medications given, see MAR. Pt stated left hand is hurting but refused PRN. Pt A&O x4. Denies any further needs. Bed in lowest position. Call light and bedside table within reach. Will continue to monitor.

## 2021-10-24 NOTE — PROGRESS NOTES
Pulmonary Progress Note  CC: Acute Hypercarbic Respiratory Failure     Subjective:    Compliant with bipap overnight    EXAM:   /69   Pulse 90   Temp 97 °F (36.1 °C) (Oral)   Resp 19   Ht 5' 1\" (1.549 m)   Wt 97 lb 11.2 oz (44.3 kg)   SpO2 97%   BMI 18.46 kg/m²  on 2 L  Constitutional:  No acute distress. Chronically ill appearing. HEENT: No scleral icterus  Neck: No tracheal deviation present. Cardiovascular: Normal heart sounds. No peripheral edema. Pulmonary/Chest: No wheezes. No rhonchi. No rales. + decreased breath sounds. Normal work of breathing without accessory muscle usage or stridor. Abdominal: Soft. Non-tender. Musculoskeletal: No cyanosis. No clubbing. Skin: Skin is warm and dry. Extensive bruising on all extremities.      Scheduled Meds:   potassium chloride  40 mEq Oral Daily    magnesium oxide  400 mg Oral BID    predniSONE  20 mg Oral Daily    furosemide  20 mg Oral See Admin Instructions    atorvastatin  20 mg Oral Nightly    busPIRone  10 mg Oral TID    [Held by provider] clopidogrel  75 mg Oral Daily    ferrous sulfate  325 mg Oral Daily with breakfast    metoprolol tartrate  25 mg Oral BID    pantoprazole  20 mg Oral Daily    polyethylene glycol  17 g Oral BID    sertraline  50 mg Oral Daily    [Held by provider] enoxaparin  40 mg SubCUTAneous Daily    ipratropium-albuterol  1 ampule Inhalation Q4H WA    sodium chloride flush  10 mL IntraVENous 2 times per day     Continuous Infusions:   sodium chloride      sodium chloride Stopped (10/20/21 1340)    dextrose       PRN Meds:  sodium chloride, melatonin, ondansetron **OR** ondansetron, polyethylene glycol, acetaminophen **OR** acetaminophen, albuterol, sodium chloride flush, sodium chloride, glucose, dextrose, glucagon (rDNA), dextrose    Labs:  CBC:   Recent Labs     10/22/21  0441 10/22/21  1803 10/23/21  0425 10/23/21  1859 10/24/21  0429   WBC 7.3  --  6.6  --  6.4   HGB 9.3*   < > 9.5* 10.6* 9.3* HCT 28.9*   < > 29.4* 33.2* 29.2*   MCV 91.6  --  95.4  --  94.3     --  238  --  225    < > = values in this interval not displayed. BMP:   Recent Labs     10/22/21  0441 10/23/21  0425 10/24/21  0429    142 145   K 3.5 3.8 3.4*    105 106   CO2 30 28 27   BUN 15 17 20   CREATININE 0.7 0.7 0.6     Cultures:  9/16/2021 SARS-CoV-2  negative  9/16/2021 influenza A & B negative  10/17/2021 Blood cultures negative    Imaging:  CXR 10/17/2021 patchy right airspace disease    ACT 10/19/21  No significant acute abdominal abnormality.  No retroperitoneal bleed.  Very   small amount of free fluid in the right pelvis of uncertain etiology and   significance.  Cholelithiasis without finding of acute cholecystitis.  Large   left and moderate right pleural effusions with bibasilar atelectasis.  Mild   anasarca. RECOMMENDATIONS:   If the patient is short of breath, ultrasound-guided thoracentesis would be   recommended for its diagnostic and therapeutic benefits. ASSESSMENT:  · Acute on chronic respiratory failure with hypoxemia and hypercapnia, baseline requirement 2.5 L O2, resolved  · Was just admitted 10/12/2021-10/14/2021 with respiratory failure, intubated during that admission  · ED 9/28/21 with respiratory failure  · Admitted 9/23/2021-9/27/2021  · Admitted 9/15/2021-9/23/2021  · Admitted 9/9/2021-9/14/2021  · Bilateral pleural effusions, L>R  · COPD  · Anemia  · Seizure like activity  · CAD  · Chronic anxiety with panic attacks  · S/P COVID-19 vaccination    PLAN:  · BiPAP HS and PRN;  on Trilogy AVAPS at [facility] and previously on Astral unit at home. 16/5 is current bipap setting.   · Plavix held 10/19/21   · Prednisone 20 mg, tapering slowly  · Plan to arrange a left thoracentesis tomorrow

## 2021-10-25 ENCOUNTER — APPOINTMENT (OUTPATIENT)
Dept: ULTRASOUND IMAGING | Age: 81
DRG: 871 | End: 2021-10-25
Payer: MEDICARE

## 2021-10-25 ENCOUNTER — APPOINTMENT (OUTPATIENT)
Dept: GENERAL RADIOLOGY | Age: 81
DRG: 871 | End: 2021-10-25
Payer: MEDICARE

## 2021-10-25 LAB
A/G RATIO: 1.8 (ref 1.1–2.2)
ALBUMIN SERPL-MCNC: 3.5 G/DL (ref 3.4–5)
ALP BLD-CCNC: 54 U/L (ref 40–129)
ALT SERPL-CCNC: 9 U/L (ref 10–40)
ANION GAP SERPL CALCULATED.3IONS-SCNC: 7 MMOL/L (ref 3–16)
AST SERPL-CCNC: 11 U/L (ref 15–37)
BASOPHILS ABSOLUTE: 0 K/UL (ref 0–0.2)
BASOPHILS RELATIVE PERCENT: 0.2 %
BILIRUB SERPL-MCNC: 0.3 MG/DL (ref 0–1)
BUN BLDV-MCNC: 22 MG/DL (ref 7–20)
CALCIUM SERPL-MCNC: 8.3 MG/DL (ref 8.3–10.6)
CHLORIDE BLD-SCNC: 104 MMOL/L (ref 99–110)
CO2: 29 MMOL/L (ref 21–32)
CREAT SERPL-MCNC: 0.7 MG/DL (ref 0.6–1.2)
EOSINOPHILS ABSOLUTE: 0 K/UL (ref 0–0.6)
EOSINOPHILS RELATIVE PERCENT: 0.4 %
GFR AFRICAN AMERICAN: >60
GFR NON-AFRICAN AMERICAN: >60
GLOBULIN: 1.9 G/DL
GLUCOSE BLD-MCNC: 103 MG/DL (ref 70–99)
HCT VFR BLD CALC: 29.1 % (ref 36–48)
HCT VFR BLD CALC: 31.6 % (ref 36–48)
HEMOGLOBIN: 10.2 G/DL (ref 12–16)
HEMOGLOBIN: 9.3 G/DL (ref 12–16)
INR BLD: 0.89 (ref 0.88–1.12)
LYMPHOCYTES ABSOLUTE: 1.7 K/UL (ref 1–5.1)
LYMPHOCYTES RELATIVE PERCENT: 22.4 %
MCH RBC QN AUTO: 30 PG (ref 26–34)
MCHC RBC AUTO-ENTMCNC: 32 G/DL (ref 31–36)
MCV RBC AUTO: 93.6 FL (ref 80–100)
MONOCYTES ABSOLUTE: 0.4 K/UL (ref 0–1.3)
MONOCYTES RELATIVE PERCENT: 5 %
NEUTROPHILS ABSOLUTE: 5.6 K/UL (ref 1.7–7.7)
NEUTROPHILS RELATIVE PERCENT: 72 %
PDW BLD-RTO: 21 % (ref 12.4–15.4)
PLATELET # BLD: 229 K/UL (ref 135–450)
PMV BLD AUTO: 8.1 FL (ref 5–10.5)
POTASSIUM REFLEX MAGNESIUM: 3.7 MMOL/L (ref 3.5–5.1)
PROTHROMBIN TIME: 10 SEC (ref 9.9–12.7)
RBC # BLD: 3.11 M/UL (ref 4–5.2)
SODIUM BLD-SCNC: 140 MMOL/L (ref 136–145)
TOTAL PROTEIN: 5.4 G/DL (ref 6.4–8.2)
WBC # BLD: 7.8 K/UL (ref 4–11)

## 2021-10-25 PROCEDURE — 6370000000 HC RX 637 (ALT 250 FOR IP): Performed by: INTERNAL MEDICINE

## 2021-10-25 PROCEDURE — 2700000000 HC OXYGEN THERAPY PER DAY

## 2021-10-25 PROCEDURE — 71045 X-RAY EXAM CHEST 1 VIEW: CPT

## 2021-10-25 PROCEDURE — 94660 CPAP INITIATION&MGMT: CPT

## 2021-10-25 PROCEDURE — 36415 COLL VENOUS BLD VENIPUNCTURE: CPT

## 2021-10-25 PROCEDURE — 99232 SBSQ HOSP IP/OBS MODERATE 35: CPT | Performed by: INTERNAL MEDICINE

## 2021-10-25 PROCEDURE — 80053 COMPREHEN METABOLIC PANEL: CPT

## 2021-10-25 PROCEDURE — 2580000003 HC RX 258: Performed by: INTERNAL MEDICINE

## 2021-10-25 PROCEDURE — 85014 HEMATOCRIT: CPT

## 2021-10-25 PROCEDURE — 94640 AIRWAY INHALATION TREATMENT: CPT

## 2021-10-25 PROCEDURE — 85018 HEMOGLOBIN: CPT

## 2021-10-25 PROCEDURE — 85025 COMPLETE CBC W/AUTO DIFF WBC: CPT

## 2021-10-25 PROCEDURE — 2060000000 HC ICU INTERMEDIATE R&B

## 2021-10-25 PROCEDURE — 94761 N-INVAS EAR/PLS OXIMETRY MLT: CPT

## 2021-10-25 PROCEDURE — 85610 PROTHROMBIN TIME: CPT

## 2021-10-25 RX ADMIN — IPRATROPIUM BROMIDE AND ALBUTEROL SULFATE 1 AMPULE: .5; 2.5 SOLUTION RESPIRATORY (INHALATION) at 16:07

## 2021-10-25 RX ADMIN — ATORVASTATIN CALCIUM 20 MG: 10 TABLET, FILM COATED ORAL at 21:16

## 2021-10-25 RX ADMIN — BUSPIRONE HYDROCHLORIDE 10 MG: 10 TABLET ORAL at 15:29

## 2021-10-25 RX ADMIN — Medication 5 MG: at 21:16

## 2021-10-25 RX ADMIN — SODIUM CHLORIDE, PRESERVATIVE FREE 10 ML: 5 INJECTION INTRAVENOUS at 21:17

## 2021-10-25 RX ADMIN — IPRATROPIUM BROMIDE AND ALBUTEROL SULFATE 1 AMPULE: .5; 2.5 SOLUTION RESPIRATORY (INHALATION) at 11:37

## 2021-10-25 RX ADMIN — ACETAMINOPHEN 650 MG: 325 TABLET ORAL at 14:28

## 2021-10-25 RX ADMIN — POTASSIUM CHLORIDE 40 MEQ: 750 TABLET, EXTENDED RELEASE ORAL at 16:17

## 2021-10-25 RX ADMIN — METOPROLOL TARTRATE 25 MG: 25 TABLET, FILM COATED ORAL at 16:17

## 2021-10-25 RX ADMIN — BUSPIRONE HYDROCHLORIDE 10 MG: 10 TABLET ORAL at 21:17

## 2021-10-25 RX ADMIN — IPRATROPIUM BROMIDE AND ALBUTEROL SULFATE 1 AMPULE: .5; 2.5 SOLUTION RESPIRATORY (INHALATION) at 08:37

## 2021-10-25 RX ADMIN — METOPROLOL TARTRATE 25 MG: 25 TABLET, FILM COATED ORAL at 21:16

## 2021-10-25 RX ADMIN — SERTRALINE HYDROCHLORIDE 50 MG: 50 TABLET ORAL at 16:00

## 2021-10-25 RX ADMIN — PANTOPRAZOLE SODIUM 20 MG: 20 TABLET, DELAYED RELEASE ORAL at 16:17

## 2021-10-25 RX ADMIN — SODIUM CHLORIDE, PRESERVATIVE FREE 10 ML: 5 INJECTION INTRAVENOUS at 16:18

## 2021-10-25 RX ADMIN — MAGNESIUM GLUCONATE 500 MG ORAL TABLET 400 MG: 500 TABLET ORAL at 16:17

## 2021-10-25 RX ADMIN — FERROUS SULFATE TAB 325 MG (65 MG ELEMENTAL FE) 325 MG: 325 (65 FE) TAB at 16:17

## 2021-10-25 RX ADMIN — PREDNISONE 20 MG: 20 TABLET ORAL at 16:17

## 2021-10-25 RX ADMIN — IPRATROPIUM BROMIDE AND ALBUTEROL SULFATE 1 AMPULE: .5; 2.5 SOLUTION RESPIRATORY (INHALATION) at 22:55

## 2021-10-25 RX ADMIN — ACETAMINOPHEN 650 MG: 325 TABLET ORAL at 06:07

## 2021-10-25 RX ADMIN — ACETAMINOPHEN 650 MG: 325 TABLET ORAL at 21:16

## 2021-10-25 RX ADMIN — POLYETHYLENE GLYCOL (3350) 17 G: 17 POWDER, FOR SOLUTION ORAL at 21:17

## 2021-10-25 RX ADMIN — IPRATROPIUM BROMIDE AND ALBUTEROL SULFATE 1 AMPULE: .5; 2.5 SOLUTION RESPIRATORY (INHALATION) at 19:10

## 2021-10-25 ASSESSMENT — PAIN SCALES - GENERAL
PAINLEVEL_OUTOF10: 6
PAINLEVEL_OUTOF10: 0
PAINLEVEL_OUTOF10: 3
PAINLEVEL_OUTOF10: 3
PAINLEVEL_OUTOF10: 0
PAINLEVEL_OUTOF10: 6
PAINLEVEL_OUTOF10: 0
PAINLEVEL_OUTOF10: 0

## 2021-10-25 ASSESSMENT — PAIN DESCRIPTION - PROGRESSION
CLINICAL_PROGRESSION: NOT CHANGED
CLINICAL_PROGRESSION: NOT CHANGED

## 2021-10-25 ASSESSMENT — PAIN DESCRIPTION - ONSET
ONSET: GRADUAL

## 2021-10-25 ASSESSMENT — PAIN DESCRIPTION - LOCATION
LOCATION: HEAD;HAND
LOCATION: HEAD;HAND
LOCATION: HEAD

## 2021-10-25 ASSESSMENT — PAIN - FUNCTIONAL ASSESSMENT: PAIN_FUNCTIONAL_ASSESSMENT: ACTIVITIES ARE NOT PREVENTED

## 2021-10-25 ASSESSMENT — PAIN DESCRIPTION - PAIN TYPE
TYPE: ACUTE PAIN

## 2021-10-25 ASSESSMENT — PAIN DESCRIPTION - ORIENTATION
ORIENTATION: LEFT
ORIENTATION: LEFT

## 2021-10-25 ASSESSMENT — PAIN DESCRIPTION - FREQUENCY
FREQUENCY: INTERMITTENT

## 2021-10-25 ASSESSMENT — PAIN DESCRIPTION - DESCRIPTORS
DESCRIPTORS: HEADACHE
DESCRIPTORS: ACHING;DISCOMFORT
DESCRIPTORS: ACHING;DISCOMFORT

## 2021-10-25 NOTE — PROGRESS NOTES
Insufficient fluid for thoracentesis. Procedure cancelled per Dr. Niurka Guthrie. Brenton Leach RN made aware.   Lito Colindres RN

## 2021-10-25 NOTE — PROGRESS NOTES
10/24/21 2000   RT Protocol   History Pulmonary Disease 2   Respiratory pattern 2   Breath sounds 2   Cough 1   Bronchodilator Assessment Score 7   RT Inhaler-Nebulizer Bronchodilator Protocol Note    There is a bronchodilator order in the chart from a provider indicating to follow the RT Bronchodilator Protocol and there is an Initiate RT Inhaler-Nebulizer Bronchodilator Protocol order as well (see protocol at bottom of note). CXR Findings:  No results found. The findings from the last RT Protocol Assessment were as follows:   History Pulmonary Disease: Chronic pulmonary disease  Respiratory Pattern: Dyspnea on exertion or RR 21-25 bpm  Breath Sounds: Slightly diminished and/or crackles  Cough: Strong, productive  Indication for Bronchodilator Therapy: Decreased or absent breath sounds  Bronchodilator Assessment Score: 7    Aerosolized bronchodilator medication orders have been revised according to the RT Inhaler-Nebulizer Bronchodilator Protocol below. Respiratory Therapist to perform RT Therapy Protocol Assessment initially then follow the protocol. Repeat RT Therapy Protocol Assessment PRN for score 0-3 or on second treatment, BID, and PRN for scores above 3. No Indications  adjust the frequency to every 6 hours PRN wheezing or bronchospasm, if no treatments needed after 48 hours then discontinue using Per Protocol order mode. If indication present, adjust the RT bronchodilator orders based on the Bronchodilator Assessment Score as indicated below. Use Inhaler orders unless patient has one or more of the following: on home nebulizer, not able to hold breath for 10 seconds, is not alert and oriented, cannot activate and use MDI correctly, or respiratory rate 25 breaths per minute or more, then use the equivalent nebulizer order(s) with same Frequency and PRN reasons based on the score.   If a patient is on this medication at home then do not decrease Frequency below that used at

## 2021-10-25 NOTE — PROGRESS NOTES
Shift assessment complete. Call light and bedside table within reach. RT in the room checking pt's home Bipap machine.  Electronically signed by Kai Zepeda RN on 10/24/2021 at 8:44 PM

## 2021-10-25 NOTE — PROGRESS NOTES
End of shift report given to [facility]. Pt in stable condition, care transferred at this time.  Electronically signed by Matthew Pantoja RN on 10/25/2021 at 7:29 AM'

## 2021-10-25 NOTE — CARE COORDINATION
INTERDISCIPLINARY PLAN OF CARE CONFERENCE    Date/Time: 10/25/2021 3:52 PM  Completed by: Supa Rodriguez RN, Case Management      Patient Name:  Benton Logan  YOB: 1940  Admitting Diagnosis: Acute on chronic respiratory failure with hypoxia and hypercapnia ([facility] [address] [S16.52, J96.22]     Admit Date/Time:  10/17/2021  5:32 PM    Chart reviewed. Interdisciplinary team contacted or reviewed plan related to patient progress and discharge plans. Disciplines included Case Management, Nursing, and Dietitian. Current Status:ongoing  PT/OT recommendation for discharge plan of care: n/a    Expected D/C Disposition:  Skilled nursing facility  Confirmed plan with patient and/or family Yes confirmed with: (name) daughterCharo ([phone])  Met with:daughterCharo ([phone])  Discharge Plan Comments: Reviewed chart. Role of discharge planner explained and patient's daughter, Charo Guerra ([phone]), verbalized understanding. Pt si from [facility] and plans to return. +bed. Pt will need a rapid covid within 72 hours of d/c.      Daughter does not want hospice. Palliative Care consult noted, Suzan Blood RN, [place] RN spoke with them. PC is nto offered at [facility]. Pt has a trilogy at [facility]. CM left a VM for Gerald Clayton stated that pt needs a new mattress at [facility]. CM inquired that pt needs a new mattress. Pt had a thoracentesis today. Home O2 in place on admit: Yes  Pt informed of need to bring portable home O2 tank on day of discharge for nursing to connect prior to leaving:  Yes  Verbalized agreement/Understanding:   Yes

## 2021-10-25 NOTE — PROGRESS NOTES
Progress Note    Admit Date:  10/17/2021    Admitted with acute on chronic respiratory failure with hypoxia and hypercarbia  Multiple admissions over last 2 months   improved with bipap     +occult blood, GI - S/P colonoscopy on Friday. Single rectal polyp removed     Subjective:    10/25  Patient is doing well. no complaints,  slept well last night. O2 sat stable on 2L . Plan is for left thoracentesis today      Objective:    Patient Vitals for the past 4 hrs:   Pulse Resp SpO2   10/25/21 1142  16 100 %   10/25/21 1140 77 16 100 %   10/25/21 0839  18 95 %   10/25/21 0838 88 18 95 %            Intake/Output Summary (Last 24 hours) at 10/25/2021 1143  Last data filed at 10/25/2021 0946  Gross per 24 hour   Intake 510 ml   Output 1600 ml   Net -1090 ml       Physical Exam:    Gen: No distress. Alert. Appears chronically frail and chronically ill   awake, alert, oriented  Eyes: PERRL. No sclera icterus. No conjunctival injection. ENT: No discharge. Pharynx clear. Neck: No JVD. Trachea midline. Resp: No accessory muscle use. Diminished throughout. No crackles. No wheezes. No rhonchi. CV: Regular rate. Regular rhythm. No murmur. No rub. No edema. Capillary Refill: Brisk,< 3 seconds   Peripheral Pulses: +2 palpable, equal bilaterally   GI: Non-tender. Non-distended. Normal bowel sounds. Skin: Warm and dry. No nodule on exposed extremities. No rash on exposed extremities. Scattered ecchymosis on BUE and BLE Left wrist wrapped with kerlex  M/S: No cyanosis. No joint deformity. No clubbing. Neuro: Awake.  Grossly nonfocal    Psych: Oriented x 3. +anxiety      Scheduled Meds:   potassium chloride  40 mEq Oral Daily    magnesium oxide  400 mg Oral BID    predniSONE  20 mg Oral Daily    furosemide  20 mg Oral See Admin Instructions    atorvastatin  20 mg Oral Nightly    busPIRone  10 mg Oral TID    [Held by provider] clopidogrel  75 mg Oral Daily    ferrous sulfate  325 mg Oral Daily with breakfast  metoprolol tartrate  25 mg Oral BID    pantoprazole  20 mg Oral Daily    polyethylene glycol  17 g Oral BID    sertraline  50 mg Oral Daily    [Held by provider] enoxaparin  40 mg SubCUTAneous Daily    ipratropium-albuterol  1 ampule Inhalation Q4H WA    sodium chloride flush  10 mL IntraVENous 2 times per day       Continuous Infusions:   sodium chloride      sodium chloride Stopped (10/20/21 1340)    dextrose         PRN Meds:  sodium chloride, melatonin, ondansetron **OR** ondansetron, polyethylene glycol, acetaminophen **OR** acetaminophen, albuterol, sodium chloride flush, sodium chloride, glucose, dextrose, glucagon (rDNA), dextrose      Data:  CBC:   Recent Labs     10/23/21  0425 10/23/21  1859 10/24/21  0429 10/24/21  1745 10/25/21  0418   WBC 6.6  --  6.4  --  7.8   HGB 9.5*   < > 9.3* 10.0* 9.3*   HCT 29.4*   < > 29.2* 31.1* 29.1*   MCV 95.4  --  94.3  --  93.6     --  225  --  229    < > = values in this interval not displayed. BMP:   Recent Labs     10/23/21  0425 10/24/21  0429 10/25/21  0418    145 140   K 3.8 3.4* 3.7    106 104   CO2 28 27 29   BUN 17 20 22*   CREATININE 0.7 0.6 0.7     LIVER PROFILE:   Recent Labs     10/23/21  0425 10/24/21  0429 10/25/21  0418   AST 12* 11* 11*   ALT 10 9* 9*   BILITOT 0.3 0.3 0.3   ALKPHOS 62 60 47       CULTURES  COVID: negative   Blood cx x2: NGTD     RADIOLOGY  CT ABDOMEN PELVIS WO CONTRAST Additional Contrast? None   Final Result   No significant acute abdominal abnormality. No retroperitoneal bleed. Very   small amount of free fluid in the right pelvis of uncertain etiology and   significance. Cholelithiasis without finding of acute cholecystitis. Large   left and moderate right pleural effusions with bibasilar atelectasis. Mild   anasarca. RECOMMENDATIONS:   If the patient is short of breath, ultrasound-guided thoracentesis would be   recommended for its diagnostic and therapeutic benefits.          XR CHEST Severe malnutrition  -Encourage nutritional supplements    Generalized weakness and debility  PT OT    DVT Prophylaxis: scds  Diet: Diet NPO  Code Status: Full Code     Plan is for thoracentesis today. Addendum   5pm    large pleural effsuion was noted on CT last week . Plavix was held . Plan was for thoracentesis today . the pleural effusion on the left was small today, not enough to do a thoracentesis. Continue Lasix    Patient clinically has been stable,  continue discharge planning back to nursing home tomorrow.     Kunal Moralez MD,10/25/2021 11:43 AM

## 2021-10-25 NOTE — PROGRESS NOTES
Patient has just received her XRAY. Depending on the results of that, patient will likely have thoracentesis today. Patient and family have been updated on the plan of care and deny any further needs.

## 2021-10-25 NOTE — PROGRESS NOTES
Patient has been updated, as well as her family member at bedside that she will not be having thoracentesis today daiana to insufficient fluid amount on left side. Patient denies any needs at this time.

## 2021-10-25 NOTE — PROGRESS NOTES
Pulmonary Progress Note  CC: Acute Hypercarbic Respiratory Failure     Subjective:    BiPAP overnight  Appears comfortable  2 L O2uses 2 L at home    EXAM:   /66   Pulse 88   Temp 97.5 °F (36.4 °C) (Oral)   Resp 18   Ht 5' 1\" (1.549 m)   Wt 101 lb 3 oz (45.9 kg)   SpO2 95%   BMI 19.12 kg/m²  on 2 L  Constitutional:  No acute distress. Evgeny Chime HEENT: no scleral icterus  Neck: No tracheal deviation present. Cardiovascular: Normal heart sounds. Pulmonary/Chest: No wheezes. No rhonchi. Left rales. No decreased breath sounds. No accessory muscle usage or stridor. Abdominal: Soft. Musculoskeletal: No cyanosis. No clubbing. Skin: Skin is warm and dry.          Scheduled Meds:   potassium chloride  40 mEq Oral Daily    magnesium oxide  400 mg Oral BID    predniSONE  20 mg Oral Daily    furosemide  20 mg Oral See Admin Instructions    atorvastatin  20 mg Oral Nightly    busPIRone  10 mg Oral TID    [Held by provider] clopidogrel  75 mg Oral Daily    ferrous sulfate  325 mg Oral Daily with breakfast    metoprolol tartrate  25 mg Oral BID    pantoprazole  20 mg Oral Daily    polyethylene glycol  17 g Oral BID    sertraline  50 mg Oral Daily    [Held by provider] enoxaparin  40 mg SubCUTAneous Daily    ipratropium-albuterol  1 ampule Inhalation Q4H WA    sodium chloride flush  10 mL IntraVENous 2 times per day     Continuous Infusions:   sodium chloride      sodium chloride Stopped (10/20/21 1340)    dextrose       PRN Meds:  sodium chloride, melatonin, ondansetron **OR** ondansetron, polyethylene glycol, acetaminophen **OR** acetaminophen, albuterol, sodium chloride flush, sodium chloride, glucose, dextrose, glucagon (rDNA), dextrose    Labs:  CBC:   Recent Labs     10/23/21  0425 10/23/21  1859 10/24/21  0429 10/24/21  1745 10/25/21  0418   WBC 6.6  --  6.4  --  7.8   HGB 9.5*   < > 9.3* 10.0* 9.3*   HCT 29.4*   < > 29.2* 31.1* 29.1*   MCV 95.4  --  94.3  --  93.6     --  225  --

## 2021-10-26 VITALS
WEIGHT: 99.38 LBS | TEMPERATURE: 98.7 F | OXYGEN SATURATION: 99 % | SYSTOLIC BLOOD PRESSURE: 122 MMHG | DIASTOLIC BLOOD PRESSURE: 78 MMHG | HEIGHT: 61 IN | RESPIRATION RATE: 20 BRPM | HEART RATE: 76 BPM | BODY MASS INDEX: 18.76 KG/M2

## 2021-10-26 LAB
A/G RATIO: 1.9 (ref 1.1–2.2)
ALBUMIN SERPL-MCNC: 3.4 G/DL (ref 3.4–5)
ALP BLD-CCNC: 53 U/L (ref 40–129)
ALT SERPL-CCNC: 10 U/L (ref 10–40)
ANION GAP SERPL CALCULATED.3IONS-SCNC: 10 MMOL/L (ref 3–16)
AST SERPL-CCNC: 11 U/L (ref 15–37)
BASOPHILS ABSOLUTE: 0 K/UL (ref 0–0.2)
BASOPHILS RELATIVE PERCENT: 0.1 %
BILIRUB SERPL-MCNC: <0.2 MG/DL (ref 0–1)
BUN BLDV-MCNC: 19 MG/DL (ref 7–20)
CALCIUM SERPL-MCNC: 8.5 MG/DL (ref 8.3–10.6)
CHLORIDE BLD-SCNC: 107 MMOL/L (ref 99–110)
CO2: 27 MMOL/L (ref 21–32)
CREAT SERPL-MCNC: 0.8 MG/DL (ref 0.6–1.2)
EOSINOPHILS ABSOLUTE: 0 K/UL (ref 0–0.6)
EOSINOPHILS RELATIVE PERCENT: 0.2 %
GFR AFRICAN AMERICAN: >60
GFR NON-AFRICAN AMERICAN: >60
GLOBULIN: 1.8 G/DL
GLUCOSE BLD-MCNC: 189 MG/DL (ref 70–99)
HCT VFR BLD CALC: 28.4 % (ref 36–48)
HEMOGLOBIN: 9.1 G/DL (ref 12–16)
LYMPHOCYTES ABSOLUTE: 1.1 K/UL (ref 1–5.1)
LYMPHOCYTES RELATIVE PERCENT: 16.2 %
MCH RBC QN AUTO: 30.6 PG (ref 26–34)
MCHC RBC AUTO-ENTMCNC: 32.2 G/DL (ref 31–36)
MCV RBC AUTO: 95.1 FL (ref 80–100)
MONOCYTES ABSOLUTE: 0.2 K/UL (ref 0–1.3)
MONOCYTES RELATIVE PERCENT: 2.5 %
NEUTROPHILS ABSOLUTE: 5.5 K/UL (ref 1.7–7.7)
NEUTROPHILS RELATIVE PERCENT: 81 %
PDW BLD-RTO: 21.4 % (ref 12.4–15.4)
PLATELET # BLD: 210 K/UL (ref 135–450)
PMV BLD AUTO: 8.1 FL (ref 5–10.5)
POTASSIUM REFLEX MAGNESIUM: 4.7 MMOL/L (ref 3.5–5.1)
RBC # BLD: 2.99 M/UL (ref 4–5.2)
SARS-COV-2, NAAT: NOT DETECTED
SODIUM BLD-SCNC: 144 MMOL/L (ref 136–145)
TOTAL PROTEIN: 5.2 G/DL (ref 6.4–8.2)
WBC # BLD: 6.8 K/UL (ref 4–11)

## 2021-10-26 PROCEDURE — 97110 THERAPEUTIC EXERCISES: CPT

## 2021-10-26 PROCEDURE — 97530 THERAPEUTIC ACTIVITIES: CPT

## 2021-10-26 PROCEDURE — 6370000000 HC RX 637 (ALT 250 FOR IP): Performed by: INTERNAL MEDICINE

## 2021-10-26 PROCEDURE — 99232 SBSQ HOSP IP/OBS MODERATE 35: CPT | Performed by: INTERNAL MEDICINE

## 2021-10-26 PROCEDURE — 94761 N-INVAS EAR/PLS OXIMETRY MLT: CPT

## 2021-10-26 PROCEDURE — 99239 HOSP IP/OBS DSCHRG MGMT >30: CPT | Performed by: INTERNAL MEDICINE

## 2021-10-26 PROCEDURE — 85025 COMPLETE CBC W/AUTO DIFF WBC: CPT

## 2021-10-26 PROCEDURE — 2700000000 HC OXYGEN THERAPY PER DAY

## 2021-10-26 PROCEDURE — 97116 GAIT TRAINING THERAPY: CPT

## 2021-10-26 PROCEDURE — 97166 OT EVAL MOD COMPLEX 45 MIN: CPT

## 2021-10-26 PROCEDURE — 94640 AIRWAY INHALATION TREATMENT: CPT

## 2021-10-26 PROCEDURE — 87635 SARS-COV-2 COVID-19 AMP PRB: CPT

## 2021-10-26 PROCEDURE — 36415 COLL VENOUS BLD VENIPUNCTURE: CPT

## 2021-10-26 PROCEDURE — 80053 COMPREHEN METABOLIC PANEL: CPT

## 2021-10-26 PROCEDURE — 2580000003 HC RX 258: Performed by: INTERNAL MEDICINE

## 2021-10-26 PROCEDURE — 97162 PT EVAL MOD COMPLEX 30 MIN: CPT

## 2021-10-26 RX ORDER — LORAZEPAM 0.5 MG/1
0.5 TABLET ORAL NIGHTLY PRN
Qty: 3 TABLET | Refills: 0 | Status: SHIPPED | OUTPATIENT
Start: 2021-10-26 | End: 2021-10-29

## 2021-10-26 RX ADMIN — PREDNISONE 20 MG: 20 TABLET ORAL at 08:24

## 2021-10-26 RX ADMIN — POLYETHYLENE GLYCOL (3350) 17 G: 17 POWDER, FOR SOLUTION ORAL at 08:24

## 2021-10-26 RX ADMIN — FERROUS SULFATE TAB 325 MG (65 MG ELEMENTAL FE) 325 MG: 325 (65 FE) TAB at 08:24

## 2021-10-26 RX ADMIN — SODIUM CHLORIDE, PRESERVATIVE FREE 10 ML: 5 INJECTION INTRAVENOUS at 08:25

## 2021-10-26 RX ADMIN — METOPROLOL TARTRATE 25 MG: 25 TABLET, FILM COATED ORAL at 08:24

## 2021-10-26 RX ADMIN — BUSPIRONE HYDROCHLORIDE 10 MG: 10 TABLET ORAL at 08:24

## 2021-10-26 RX ADMIN — IPRATROPIUM BROMIDE AND ALBUTEROL SULFATE 1 AMPULE: .5; 2.5 SOLUTION RESPIRATORY (INHALATION) at 11:44

## 2021-10-26 RX ADMIN — SERTRALINE HYDROCHLORIDE 50 MG: 50 TABLET ORAL at 08:24

## 2021-10-26 RX ADMIN — POTASSIUM CHLORIDE 40 MEQ: 750 TABLET, EXTENDED RELEASE ORAL at 08:24

## 2021-10-26 RX ADMIN — MAGNESIUM GLUCONATE 500 MG ORAL TABLET 400 MG: 500 TABLET ORAL at 08:24

## 2021-10-26 RX ADMIN — ACETAMINOPHEN 650 MG: 325 TABLET ORAL at 11:47

## 2021-10-26 RX ADMIN — IPRATROPIUM BROMIDE AND ALBUTEROL SULFATE 1 AMPULE: .5; 2.5 SOLUTION RESPIRATORY (INHALATION) at 15:15

## 2021-10-26 RX ADMIN — IPRATROPIUM BROMIDE AND ALBUTEROL SULFATE 1 AMPULE: .5; 2.5 SOLUTION RESPIRATORY (INHALATION) at 07:45

## 2021-10-26 RX ADMIN — BUSPIRONE HYDROCHLORIDE 10 MG: 10 TABLET ORAL at 14:49

## 2021-10-26 RX ADMIN — PANTOPRAZOLE SODIUM 20 MG: 20 TABLET, DELAYED RELEASE ORAL at 08:23

## 2021-10-26 ASSESSMENT — PAIN DESCRIPTION - PAIN TYPE: TYPE: ACUTE PAIN

## 2021-10-26 ASSESSMENT — PAIN DESCRIPTION - LOCATION: LOCATION: HEAD

## 2021-10-26 ASSESSMENT — PAIN DESCRIPTION - ONSET: ONSET: GRADUAL

## 2021-10-26 ASSESSMENT — PAIN SCALES - GENERAL
PAINLEVEL_OUTOF10: 5
PAINLEVEL_OUTOF10: 0

## 2021-10-26 ASSESSMENT — PAIN DESCRIPTION - DESCRIPTORS: DESCRIPTORS: ACHING

## 2021-10-26 ASSESSMENT — PAIN DESCRIPTION - FREQUENCY: FREQUENCY: INTERMITTENT

## 2021-10-26 NOTE — PROGRESS NOTES
RT Nebulizer Bronchodilator Protocol Note    There is a bronchodilator order in the chart from a provider indicating to follow the RT Bronchodilator Protocol and there is an Initiate RT Bronchodilator Protocol order as well (see protocol at bottom of note). CXR Findings:  XR CHEST PORTABLE    Result Date: 10/25/2021  Interval improvement of patchy airspace opacities. Small left pleural effusion. The findings from the last RT Protocol Assessment were as follows:  Smoking: (P) Chronic pulmonary disease  Respiratory Pattern: (P) Dyspnea on exertion or RR 21-25 bpm  Breath Sounds: (P) Slightly diminished and/or crackles  Cough: (P) Strong, productive  Indication for Bronchodilator Therapy: (P) On home bronchodilators  Bronchodilator Assessment Score: (P) 7    Aerosolized bronchodilator medication orders have been revised according to the RT Nebulizer Bronchodilator Protocol below. Respiratory Therapist to perform RT Therapy Protocol Assessment initially then follow the protocol. Repeat RT Therapy Protocol Assessment PRN for score 0-3 or on second treatment, BID, and PRN for scores above 3. No Indications  adjust the frequency to every 6 hours PRN wheezing or bronchospasm, if no treatments needed after 48 hours then discontinue using Per Protocol order mode. If indication present, adjust the RT bronchodilator orders based on the Bronchodilator Assessment Score as indicated below. If a patient is on this medication at home then do not decrease Frequency below that used at home. 0-3  enter or revise RT bronchodilator order(s) to equivalent RT Bronchodilator order with Frequency of every 4 hours PRN for wheezing or increased work of breathing using Per Protocol order mode.        4-6  enter or revise RT Bronchodilator order(s) to two equivalent RT bronchodilator orders with one order with BID Frequency and one order with Frequency of every 4 hours PRN wheezing or increased work of breathing using Per Protocol order mode. 7-10  enter or revise RT Bronchodilator order(s) to two equivalent RT bronchodilator orders with one order with TID Frequency and one order with Frequency of every 4 hours PRN wheezing or increased work of breathing using Per Protocol order mode. 11-13  enter or revise RT Bronchodilator order(s) to one equivalent RT bronchodilator order with QID Frequency and an Albuterol order with Frequency of every 4 hours PRN wheezing or increased work of breathing using Per Protocol order mode. Greater than 13  enter or revise RT Bronchodilator order(s) to one equivalent RT bronchodilator order with every 4 hours Frequency and an Albuterol order with Frequency of every 2 hours PRN wheezing or increased work of breathing using Per Protocol order mode. RT to enter RT Home Evaluation for COPD & MDI Assessment order using Per Protocol order mode.     Electronically signed by Fred Meza RCP on 10/25/2021 at 11:03 PM

## 2021-10-26 NOTE — PROGRESS NOTES
Inpatient Occupational Therapy  Evaluation and Treatment    Unit: PCU  Date:  10/26/2021  Patient Name:    Eros Fontana  Admitting diagnosis:  Acute on chronic respiratory failure with hypoxia and hypercapnia ([facility] [D73.67, J96.22]  Admit Date:  10/17/2021  Precautions/Restrictions/WB Status/ Lines/ Wounds/ Oxygen: Fall risk, Bed/chair alarm and Lines -Supplemental O2 (2) and Purewick catheter impaired vision  Thoracentesis on 10-25-21   admitted 10/12/2021-10/14/2021 with respiratory failure  ? Admitted 9/23/2021-9/27/2021  ? Admitted 9/15/2021-9/23/2021  ? Admitted 9/9/2021-9/14/2021  Treatment Time:  930-1000  Treatment Number: 1   Timed code treatment minutes 20 minutes   Total Treatment minutes:   30   minutes    Patient Goals for Therapy:  \" go home \"      Discharge Recommendations: SNF  DME needs for discharge: defer to facility       Therapy recommendations for staff:   Assist of 1 with use of No AD for all transfers to/from [facility]  to/from [facility]    History of Present Illness: per H&P on 10-17-21 Levi KISER   [de-identified] y.o. female with a past medical history of chronic respiratory failure on 2 L nasal cannula, severe COPD, and CAD who presents to the ED in respiratory distress. She arrives from [facility]. The patient is not able to provide a history, although she does open her eyes to voice and answers questions. Per staff, the patient was noted to have increased work of breathing and appeared short of breath. She was noted to be hypoxic on nasal cannula, so nonrebreather was applied and she apparently was given breaths with bag valve mask. Saturations briefly improved, and she was placed back on a nasal cannula and became hypoxic again. They placed a nonrebreather back on the patient and EMS was called. On arrival here, she does describe shortness of breath. She denies chest pain or fever. She has frequent admissions for same.   Home Health S4 Level Recommendation:  NA  AM-PAC Score: 18  Preadmission Environment  Pt recently an inpt at this facility the middle of Oct   Several re admissions to hospital      Pt. Vangie Funmi kaye ( Son)  (Son who works just across the street )-Pt alone at times   Home environment:    one story home  Steps to enter first floor: 1 steps to enter and no handrail  Steps to second floor: N/A  Bathroom: tub/shower unit, and standard height commode,tub transfer bench  Equipment owned: SW, wc (manual), lift chair, hospital bed  (doesn't go up and down), home O2 (2L) continous and lifealert     Preadmission Status:  Pt. Able to drive: No  Pt Fully independent with ADLs: yes - sponge bath   Pt. Required assistance from family for: Cleaning, Cooking and Laundry -sometimes the pt would assist on her good days   Pt. independent for transfers and gait and walked with No Device  History of falls No       Pain  Yes  Rating:mild  Location:buttocks   Pain Medicine Status: No request made      Cognition    A&O not assess    Able to follow 2 step commands    Subjective  Patient lying supine in bed with family at bedside. Pt agreeable to this OT eval & tx.      Upper Extremity ROM:    WFL    Upper Extremity Strength:    Not formally assessed however through observation [facility]     Upper Extremity Sensation    NT    Upper Extremity Proprioception:  NT    Coordination and Tone  WFL    Balance  Functional Sitting Balance:  WFL  Functional Standing Balance:Impaired    Bed mobility:    Supine to sit:   Modified Independent- with head of bed elevated   Sit to supine:   Not Tested  Rolling:    SBA  Scooting in sitting:  Supervision  Scooting to head of bed:   Not Tested    Bridging:   Not Tested    Transfers:    Sit to stand:  CGA  Stand to sit:  CGA  Bed to chair:   CGA  Standard toilet: Not Tested  Bed to [facility]:  Not Tested    Dressing:      UE:   Not Tested  LE:    Independent donning socks in bed     Bathing:    UE:  Not Tested  LE:  Not Tested    Eating:   Not Tested    Toileting:  Not Tested    Activity Tolerance   Pt completed therapy session with anxiety, decreased balance   Supine: Sp02=97% HR=89   Seated EOB: PM=[birth year] SpO2=99%, HR=82    Positioning Needs:   Pt up in chair, alarm set, positioned in proper neutral alignment and pressure relief provided. Exercise / Activities Initiated:   N/A    Patient/Family Education:   Role of OT    Assessment of Deficits: Pt seen for Occupational therapy evaluation in acute care setting. Pt demonstrated decreased Activity tolerance, ADLs, Balance , Bed mobility, Safety Awareness and Transfers. Pt functioning below baseline and will likely benefit from skilled occupational therapy services to maximize safety and independence. Goal(s) : To be met in 3 Visits:  1). Bed to toilet/BSC: SBA with AD as needed     To be met in 5 Visits:  1). Supine to/from Sit:  Independent  2). Upper Body Bathing:   Supervision  3). Lower Body Bathing:   CGA  4). Upper Body Dressing:  Supervision  5). Lower Body Dressing:  SBA- CGA  6). Pt to demonstrate UE exs x 15 reps with minimal cues    Rehabilitation Potential:  Fair for goals listed above. Strengths for achieving goals include: PLOF  Barriers to achieving goals include:  Complexity of condition     Plan: To be seen 2-3 x/wk  while in acute care setting for therapeutic exercises, bed mobility, transfers, dressing, bathing, family/patient education, ADL/IADL retraining, energy conservation training.      Rex Mosher OTR/L [id number]          If patient discharges from this facility prior to next visit, this note will serve as the Discharge Summary

## 2021-10-26 NOTE — DISCHARGE INSTR - COC
Continuity of Care Form    Patient Name: Janet Zuniga   :    MRN:  [id number]    Admit date:  10/17/2021  Discharge date:  10/26/2021    Code Status Order: Full Code   Advance Directives:   Advance Care Flowsheet Documentation       Date/Time Healthcare Directive Type of Healthcare Directive Copy in [address] Agent's Name Healthcare Agent's Phone Number    10/22/21 1417  No, patient does not have an advance directive for healthcare treatment  Jacquelyn Johns 157            Admitting Physician:  Lucille Ordaz MD  PCP: Zurdo Lal MD    Discharging Nurse: David Yancey 23 Unit/Room#: /5892-51  Discharging Unit Phone Number: [phone]    Emergency Contact:   Extended Emergency Contact Information  Primary Emergency Contact: [address] of [address] [place] [address] Phone: [phone]  Relation: Child  Secondary Emergency Contact: [address] [facility] Phone: [phone]  Relation: Other    Past Surgical History:  Past Surgical History:   Procedure Laterality Date    BRONCHOSCOPY  2019    Dr. Leonard Ibrahim - w/BAL   [address]  2019    Dr. Alcantara Has 2020    COLONOSCOPY DIAGNOSTIC performed by Paco Ritter DO at [address] [facility] N/A 10/22/2021    COLONOSCOPY POLYPECTOMY SNARE/COLD BIOPSY performed by Hakeem Galarza MD at [address] [place] [address] Ave N/A 2019    OFF PUMP CORONARY ARTERY BYPASS GRAFTING X2, INTERNAL MAMMARY ARTERY, SAPHENOUS VEIN GRAFT performed by Ahsan Quinones MD at [facility] CATH  2021    [facility] CATH 2021 [address] [facility], PARTIAL Left     SIGMOIDOSCOPY  2020    4 bands    SIGMOIDOSCOPY N/A 2020    FLEX SIG W/ BANDING SIGMOIDOSCOPY DIAGNOSTIC FLEXIBLE performed by Paco Ritter DO at [address] [facility] [address] Immunization History:   Immunization History   Administered Date(s) Administered    COVID-19, Pfizer, PF, 30mcg/0.3mL 03/24/2021, 04/14/2021    Influenza Virus Vaccine 01/05/2013, 10/17/2013    Influenza, Quadv, 6 mo and older, IM, PF (Flulaval, Fluarix) 03/19/2019    Influenza, Quadv, IM, PF (6 mo and older Fluzone, Flulaval, Fluarix, and 3 yrs and older Afluria) 10/04/2019, 10/14/2021    Pneumococcal Conjugate 13-valent (Arleen Sermon) 07/19/2017, 07/19/2017    Pneumococcal Polysaccharide (Klacsbidl93) 02/19/2015, 02/19/2015    Tdap (Boostrix, Adacel) 07/17/2018       Active Problems:  Patient Active Problem List   Diagnosis Code    Hyperlipidemia E78.5    Asthma J45.909    OA (osteoarthritis) M19.90    Tobacco use Z72.0    COPD exacerbation ([facility] J44.1    Sepsis ([facility] A41.9    Abnormal chest x-ray R93.89    COPD with acute exacerbation ([facility] J44.1    Shortness of breath R06.02    Tobacco abuse Z72.0    Moderate malnutrition ([facility]) E44.0    NSTEMI (non-ST elevated myocardial infarction) ([facility] I21.4    Elevated troponin R77.8    Acute respiratory failure with hypoxia ([facility]) J96.01    CAD (coronary artery disease) I25.10    Acute pulmonary edema ([facility]) J81.0    Pulmonary infiltrate R91.8    Elevated brain natriuretic peptide (BNP) level R79.89    Leukocytosis D72.829    Ischemic cardiomyopathy I25.5    Acute combined systolic and diastolic congestive heart failure ([facility]) I50.41    Hypernatremia E87.0    NADJA (acute kidney injury) ([facility] N17.9    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    Mucus plugging of bronchi T17.500A    CAD in native artery I25.10    S/P CABG (coronary artery bypass graft) Z95.1    Pneumonia of both lower lobes due to methicillin resistant Staphylococcus aureus (MRSA) ([facility]) J15.212    GI bleed K92.2    Severe malnutrition (HCC) E43    Chest pain R07.9    Chronic respiratory failure with hypoxia (HCC) J96.11    Primary hypertension I10    Anemia D64.9    Acute respiratory failure ([facility]) J96.00    Acute respiratory distress R06.03    Volume overload E87.70    Demand ischemia ([facility]) I24.8    GERD (gastroesophageal reflux disease) K21.9    Acute respiratory failure with hypoxia and hypercapnia (HCC) J96.01, J96.02    Shock (HCC) R57.9    Pneumonia due to infectious organism J18.9    Acute on chronic respiratory failure with hypoxia and hypercapnia (HCC) J96.21, J96.22    Chronic obstructive pulmonary disease (HCC) J44.9    Acute on chronic respiratory failure with hypoxemia (HCC) J96.21    Chronic anxiety F41.9    Congestive heart failure ([facility]) I50.9    Acute respiratory failure with hypoxia and hypercarbia (HCC) J96.01, J96.02    Acute kidney injury (HCC) N17.9    Elevated procalcitonin R79.89    COPD, severe (HCC) J44.9    Anxiety F41.9    Severe anxiety F41.9    Severe protein-calorie malnutrition ([facility]) E43    HCAP (healthcare-associated pneumonia) J18.9    Pleural effusion J90       Isolation/Infection:   Isolation            No Isolation          Patient Infection Status       Infection Onset Added Last Indicated Last Indicated By Review Planned Expiration Resolved Resolved By    None active    Resolved    COVID-19 Rule Out 10/17/21 10/17/21 10/17/21 COVID-19, Rapid (Ordered)   10/17/21 Rule-Out Test Resulted    COVID-19 Rule Out 10/14/21 10/14/21 10/14/21 COVID-19, Rapid (Ordered)   10/14/21 Rule-Out Test Resulted    COVID-19 Rule Out 10/12/21 10/12/21 10/12/21 COVID-19 & Influenza Combo (Ordered)   10/12/21 Rule-Out Test Resulted    COVID-19 Rule Out 09/28/21 09/28/21 09/28/21 COVID-19 & Influenza Combo (Ordered)   09/28/21 Rule-Out Test Resulted    COVID-19 Rule Out 09/27/21 09/27/21 09/27/21 COVID-19, Rapid (Ordered)   09/27/21 Rule-Out Test Resulted    COVID-19 Rule Out 09/23/21 09/23/21 09/23/21 COVID-19, Rapid (Ordered)   09/23/21 Rule-Out Test Resulted    COVID-19 Rule Out 09/16/21 09/16/21 09/16/21 COVID-19 & Influenza Combo (Ordered)   09/16/21 Rule-Out Test Resulted    COVID-19 Rule Out 09/09/21 09/09/21 09/09/21 COVID-19 (Ordered)   09/10/21 Monika Beal RN    COVID-19 Rule Out 09/09/21 09/09/21 09/09/21 COVID-19, Rapid (Ordered)   09/09/21 Rule-Out Test Resulted    COVID-19 Rule Out 09/09/21 09/09/21 09/09/21 SARS-CoV-2 NAAT (Rapid) (Ordered)   09/09/21 Rule-Out Test Resulted    COVID-19 Rule Out 08/30/21 08/30/21 08/30/21 COVID-19 (Ordered)   08/30/21 Rule-Out Test Resulted    COVID-19 Rule Out 08/29/21 08/29/21 08/29/21 COVID-19, Rapid (Ordered)   08/29/21 Rule-Out Test Resulted    COVID-19 Rule Out 06/01/21 06/01/21 06/01/21 COVID-19 & Influenza Combo (Ordered)   06/01/21 Rule-Out Test Resulted    MRSA 09/22/19 09/25/19 09/22/19 Respiratory Culture   06/01/21 Carmen Fiore RN            Nurse Assessment:  Last Vital Signs: /78   Pulse 76   Temp 98.7 °F (37.1 °C) (Axillary)   Resp 20   Ht 5' 1\" (1.549 m)   Wt 99 lb 6 oz (45.1 kg)   SpO2 100%   BMI 18.78 kg/m²     Last documented pain score (0-10 scale): Pain Level: 0  Last Weight:   Wt Readings from Last 1 Encounters:   10/26/21 99 lb 6 oz (45.1 kg)     Mental Status:  oriented, alert, coherent, logical, thought processes intact and able to concentrate and follow conversation    IV Access:  - None    Nursing Mobility/ADLs:  Walking   Assisted  Transfer  Assisted  Bathing  Assisted  Dressing  Assisted  Toileting  Assisted  Feeding  Independent  Med Admin  Assisted  Med Delivery   whole in pudding    Wound Care Documentation and Therapy:  Wound 10/20/21 Hand Right;Dorsal skin tear to left hand, tendon exposed (Active)   Wound Image   10/20/21 1933   Wound Etiology Skin Tear 10/26/21 0800   Dressing Status Clean;Dry; Intact 10/26/21 0000   Wound Cleansed Irrigated with saline 10/21/21 1533   Dressing/Treatment Xeroform 10/25/21 0831   Dressing Change Due 10/28/21 10/26/21 0000   Odor None 10/26/21 0000   Number of days: 6        Elimination:  Continence:   · Bowel: today and will wear tonight. / signature: Electronically signed by Mary Shaw RN on 10/26/21 at 12:58 PM EDT    PHYSICIAN SECTION    Prognosis: Fair    Condition at Discharge: Stable    Rehab Potential (if transferring to Rehab): Fair    Recommended Labs or Other Treatments After Discharge: NA    Physician Certification: I certify the above information and transfer of Jose Santana  is necessary for the continuing treatment of the diagnosis listed and that she requires [facility] for less 30 days.      Update Admission H&P: No change in H&P    PHYSICIAN SIGNATURE:  Electronically signed by Mateusz Smallwood MD on 10/26/21 at 11:27 AM EDT

## 2021-10-26 NOTE — PROGRESS NOTES
Shift assessment complete- see flow sheet. Patient is A/Ox4. VSS. Morning medications given without difficulty in pudding. Currently on 2 L NC and this is her baseline, SpO2 WNL. Lung sounds diminished. Denies shortness of breath. Denies CP. Pt wants to be discharged to home. To see PT/OT today. Patient denies any further needs. Call light explained and in reach. Bed alarm on. Will continue to monitor.

## 2021-10-26 NOTE — PLAN OF CARE
Problem: Skin Integrity:  Goal: Will show no infection signs and symptoms  Description: Will show no infection signs and symptoms  Outcome: Completed  Goal: Absence of new skin breakdown  Description: Absence of new skin breakdown  Outcome: Completed     Problem: Falls - Risk of:  Goal: Will remain free from falls  Description: Will remain free from falls  Outcome: Completed  Goal: Absence of physical injury  Description: Absence of physical injury  Outcome: Completed     Problem: Pain:  Goal: Pain level will decrease  Description: Pain level will decrease  Outcome: Completed  Goal: Control of acute pain  Description: Control of acute pain  Outcome: Completed  Goal: Control of chronic pain  Description: Control of chronic pain  Outcome: Completed     Problem: Nutrition  Goal: Optimal nutrition therapy  10/26/2021 1540 by Mali Mixno RN  Outcome: Completed  10/26/2021 1307 by Mirtha Wetzel  Outcome: Ongoing  Goal: Understanding of nutritional guidelines  10/26/2021 1540 by Mali Mixon RN  Outcome: Completed  10/26/2021 1307 by Mirtha Wetzel  Outcome: Ongoing

## 2021-10-26 NOTE — CARE COORDINATION
DISCHARGE ORDER  Date/Time 10/26/2021 12:58 PM  Completed by: Brodie Neumann RN, Case Management    Patient Name: Ban Part    : 1940      Admit order Date and Status:10/17/2021  Noted discharge order. (verify MD's last order for status of admission/Traditional Medicare 3 [place] Inpatient qualifying stay required for SNF)    Confirmed discharge plan with:              Patient:  Yes              When pt confirms DC plan does any support person need to be contacted by CM Yes if yes who____daughter, Courtney Kerns ([phone])__                      Discharge to Facility: [address] [place] [address] phone number for staff giving report:   · Name: Pending sale to [facility])  · Address: [address] , [place], [place], [address]  · BSWRD:[phone]  · SQD:[phone] or [phone]       Pre-certification completed: not needed   Hospital Exemption Notification (HENS) completed: not needed   Discharge orders and Continuity of Care faxed to facility:  [phone]      Transportation:               Medical Transport explained with choice list offered to pt/family. Choice:Yes(no preference)  Agency used: [facility] up time:   [phone]      Pt/family/Nursing/Facility aware of  time: yes  Yes Names: pt, pt's daughter Judith Lee with Dayanara Islas RN  Ambulance form completed:  yes:      Comments: Faxed LUPE/AVS faxed to [phone]. Rapid covid for SNF ordered and Malena Dial RN, informed. CM informed Emelia with [facility] that Courtney Kerns is requesting that [facility] gets a new mattress for pt. [facility] states that the pt's triology was serviced and returned today. Pt's Astril (AVAPS) from home is here at bedside. CM stated that family should bring this home, but if not then it will go back with pt via squad. CM informed Juan of this. Pt is being d/c'd back to [facility] today.  Pt's O2 sats are 99% on 2. 5L. Discharge timeout done with Ervin Cowan RN. All discharge needs and concerns addressed. Discharging nurse to complete LUPE, reconcile AVS, and place final copy with patient's discharge packet. Discharging RN to ensure that written prescriptions for  Level II medications are sent with patient to the facility as per protocol.

## 2021-10-26 NOTE — PROGRESS NOTES
Comprehensive Nutrition Assessment    Type and Reason for Visit:  Reassess    Nutrition Recommendations/Plan:   1. Continue ADULT DIET: regular and please continue to document po intake in the flow sheet   2. Ensure Clear with meals and please document po intake in the flow sheet  3. Monitor weight trends, bowel function, and nutrition-related labs  4.  Monitor chewing ability and nausea after meals     Nutrition Assessment:  pt has improved from a nutritional standpoint since last RD assessment AEB improved po intake, improved O2 saturations, and no further wt loss; pt remains at risk for further compromise d\t reoccuring hospital admissions for acute respiratory hypoxia and impaired chewing r\t dentures no longer fitting properly; will continue ADULT DIET: Regular + Ensure clear with meals    Malnutrition Assessment:  Malnutrition Status:  Severe malnutrition    Context:  Acute Illness     Findings of the 6 clinical characteristics of malnutrition:  Energy Intake:  7 - 50% or less of estimated energy requirements for 5 or more days  Weight Loss:  7 - Greater than 5% over 1 month (- 10# or 8.8% weight loss since 10/17/21)     Body Fat Loss:  7 - Moderate body fat loss (orbitals were puffy) Triceps   Muscle Mass Loss:  7 - Moderate muscle mass loss Temples (temporalis), Clavicles (pectoralis & deltoids)  Fluid Accumulation:  No significant fluid accumulation Extremities   Strength:  Not Performed    Estimated Daily Nutrient Needs:  Energy (kcal):  8516-3354 kcals based on 30-33 kcal\kg\CBW; Weight Used for Energy Requirements:  Current     Protein (g):  63-72g of protein based on 1.4-1.6g\kg\CBW; Weight Used for Protein Requirements:  Current        Fluid (ml/day):  8299-5821 ml; Method Used for Fluid Requirements:  1 ml/kcal      Nutrition Related Findings:  elderly female sitting up in recliner eating lunch; A&O x 4; pt presented to ED d\t respiratory distress; pt has had multiple admissions to [facility] over the past 2 months for similiar problems; had colonoscopy 10/22; stated last BM was right before colonscopy after colonoscopy prep; pt comes from [facility] where they prepare her meals for her; staff at [facility] brings meals to pt's room; pt stated that she usually does not have problems eating however right before admission to [facility] she had been becoming SOB while eating; SOB caused lack of po intake; pt explained it like trying to drink water after running for a long period of time; pt mentioned she has lost wt and this caused her dentures to no longer fit; pt requested meats to be pureed to ease chewing; during RD assessment pt appeared to be feeling much better; she was actively eating her lunch; stated she was starving; informed me that she gets hungry in the middle of the night and asks the staff for dessert or PB&J; pt becomes nauseous if she over eats or eats to quickly; pt enjoys drinking the Ensure clear; drinks these when she is at [facility]      Wounds:  Stage II, Pressure Injury       Current Nutrition Therapies:    ADULT DIET; Regular  ADULT ORAL NUTRITION SUPPLEMENT; Breakfast, Lunch, Dinner; Clear Liquid Oral Supplement    Anthropometric Measures:  · Height: 5' 1\" (154.9 cm) (obtained 40\77\27)  · Current Body Weight: 99 lb 6 oz (45.1 kg) (obtained 10/26/21; [facility])   · Admission Body Weight: 109 lb (49.4 kg) (obtained 10\17\21; [facility])    · Usual Body Weight: 109 lb (49.4 kg) (obtained 10\17\21; [facility])     · Ideal Body Weight: 105 lbs; % Ideal Body Weight 94.6 %   · BMI: 18.8  · BMI Categories: Normal Weight (BMI 18.5-24. 9)       Nutrition Diagnosis:   · Severe malnutrition related to altered GI function, impaired respiratory function, inadequate protein-energy intake, partial or complete edentulism, biting/chewing (masticatory) difficulty as evidenced by poor intake prior to admission, NPO or clear liquid status due to medical condition, wounds, weight loss greater than or equal to 5% in 1 month, moderate muscle loss, moderate loss of subcutaneous fat, poor dentition, nausea      Nutrition Interventions:   Food and/or Nutrient Delivery:  Continue Current Diet, Continue Oral Nutrition Supplement  Nutrition Education/Counseling:  No recommendation at this time   Coordination of Nutrition Care:  Continue to monitor while inpatient    Goals:  patient will increase her nutrition intake by accepting and consuming > 50% of meals and supps       Nutrition Monitoring and Evaluation:   Behavioral-Environmental Outcomes:  None Identified   Food/Nutrient Intake Outcomes:  Food and Nutrient Intake, Supplement Intake  Physical Signs/Symptoms Outcomes:  Biochemical Data, Nutrition Focused Physical Findings, Skin, Weight, GI Status, Chewing or Swallowing, Meal Time Behavior, Nausea or Vomiting     Discharge Planning:     Too soon to determine     Electronically signed by Luisana Osorio on 10/26/21 at 1:08 PM EDT    Contact: [phone]

## 2021-10-26 NOTE — PROGRESS NOTES
Inpatient Physical Therapy Evaluation and Treatment    Unit: PCU  Date:  10/26/2021  Patient Name:    Georgia Mistry  Admitting diagnosis:  Acute on chronic respiratory failure with hypoxia and hypercapnia ([facility] [J96.21, J96.22]  Admit Date:  10/17/2021  Precautions/Restrictions/WB Status/ Lines/ Wounds/ Oxygen: Fall risk, Bed/chair alarm and Lines -Supplemental O2 (2L) and Purewick catheter    Treatment Time:  09:20-10:25  Treatment Number:  1   Timed Code Treatment Minutes: 55 minutes  Total Treatment Minutes:  65  minutes    Patient Goals for Therapy: \" I want to go home \"          Discharge Recommendations: SNF, due on limitations of BiPap support. DME needs for discharge: defer to facility       Therapy recommendation for EMS Transport: requires transport by cot due to limited sitting tolerance. Therapy recommendations for staff:   Assist of 1 with use of careful handhold assist and gait belt (pt refuses walker) for all transfers to/from chair    History of Present Illness: Per H&P Dr. Carlota Garduno 10/17: \"The patient is a [de-identified] y.o. female with PMH below, presents with SOB, increased O2 demand, resp distress, frequent admissions for resp problems. Pt presents from SNF where she had marked hypoxia. Family at bedside reports pt's sats in 19's at SNF. She required NRB mask and also ambu bag for a short time per ED. She was was placed on BIPAP in the ED which she continues on. Limited ability to obtain additional Hx 2/2 pt being on BIPAP as it is difficult to hear/understand her. However, was able to determine pt is alert and oriented. \"   Per Dr. Carolin Gonzales progress note 8/25:   \"ASSESSMENT:  · Acute on chronic hypoxemic hypercapnic respiratory failure, baseline requirement 2.5 L O2, resolved  ? Was just admitted 10/12/2021-10/14/2021 with respiratory failure, intubated during that admission  ? ED 9/28/21 with respiratory failure  ? Admitted 9/23/2021-9/27/2021  ? Admitted 9/15/2021-9/23/2021  ?  Admitted 9/9/2021-9/14/2021  · Bilateral pleural effusions, L>R  · COPD  · Seizure like activity  · CAD  · Chronic anxiety with panic attacks  · S/P COVID-19 vaccination     PLAN:  · Continue BiPAP HS and PRN;  on Trilogy AVAPS at [facility] and previously on Astral unit at home. 16/5 is current bipap setting. · Inhaled bronchodilators  · Prednisone 20 mg, taper slowly  · Plavix held 10/19/21   · Possible left-sided thoracentesis today  ? Discussed with family at the bedside\"    Home Health S4 Level Recommendation:  NA     AM-PAC Mobility Score       AM-PAC Inpatient Mobility without Stair Climbing Raw Score : 16    Preadmission Environment    Pt has been at [facility] SNF PTA. Several re admissions to hospital recently. Prior to this:  Cheli kaye (Son who works just across the [employer] [place])  Home environment:    one story home  Steps to enter first floor: 1 steps to enter and no handrail  Steps to second floor: N/A  Bathroom: tub/shower unit, and standard height commode,tub transfer bench  Equipment owned: SW, wc (manual), lift chair, hospital bed  (doesn't go up and down), home O2 (2L) continous and lifealert     Preadmission Status:  Pt. Able to drive: No  Pt Fully independent with ADLs: No  Pt. Required assistance from family for: Cleaning, Cooking and [address]  Pt. independent for transfers and gait and walked with No Device  History of falls No    Pain   None initially. Reporting pain after sitting in recliner. Location: Bottom (at site of pressure wounds)  Rating: mild /10  Pain Medicine Status: No request made; cushion provided for recliner, pt satisfied. Cognition    A&O orientation not directly assessed. Able to follow 2 step commands    Subjective  Patient lying supine in bed with grandaugther present. Pt agreeable to this PT eval & tx. Upper Extremity ROM/Strength  Please see OT evaluation. Lower Extremity ROM / Strength   AROM WFL: Yes    BLE strength impaired, but not formally assessed with MMT. Grossly ~3+/5 throughout based on demonstrated mobility. Lower Extremity Sensation    Pt reports numbness in bilat feet when standing. Lower Extremity Proprioception:   NT    Coordination and Tone  NT    Balance  Sitting:  Good ; Supervision  Comments: Multiple bouts sitting unsupported at EOB for 5-10 minutes. Standing: Fair -; Min A   Comments: Pt refused use of walker. Standing with handhold assist, pt has moderate A/P trunk sway and decreased ankle/hip strategy. Stepping reactions are delayed and dyskinetic. Bed Mobility   Supine to Sit:    SBA  Sit to Supine:   Not Tested  Rolling:   SBA  Scooting in sitting: SBA  Scooting in supine:  Not Tested   Bridging:   Independent; pt able to bridge to place brief under bottom, and then later bridging in recliner so that therapist could place waffle cushion under seat. Transfer Training     Sit to stand:   CGA from EOB 2x; from recliner 1x. Stand to sit:   CGA; cues to back up fully to chair before sitting. Bed to Chair:   CGA with use of gait belt for partial stand pivot to R from bed>chair; pt reaching for armrests for support, not fully standing. Gait gait completed as indicated below  Distance:      3 ft + 3 ft (bed to chair with 90* turn to seat)  Deviations (firm surface/linoleum):  decreased elbert, narrow DAELINA, decreased step length bilaterally and decreased control of foot placement bilaterally in swing phase. Moderate A/P sway. Assistive Device Used:    gait belt  Level of Assist:    Min A - handhold assist. Pt refused walker. Comment: Unsteady. Stair Training deferred, pt does not have stairs in home environment    Activity Tolerance   Pt completed therapy session with No adverse symptoms noted w/activity   Supine: Sp02=97% HR=89   Seated EOB: AP=097/67 SpO2=99%, HR=82    Positioning Needs   Pt reclined in chair, alarm set, positioned in proper neutral alignment and pressure relief provided.    Call light provided and all needs within reach    Exercises Initiated  Roger deferred secondary to treatment focus on functional mobility  NA    Other  None. Patient/Family Education   Pt educated on role of inpatient PT, POC, importance of continued activity, transfer techniques and calling for assist with mobility. Assessment  Pt seen for Physical Therapy evaluation in acute care setting. Pt demonstrated decreased Activity tolerance, Balance, Safety and Strength as well as decreased independence with Ambulation and Transfers. Pt completes bed mobility fairly well, however with functional transfers and ambulation she is very unsteady. She refuses use of the walker. Pt will benefit from skilled PT in acute setting to promote activity tolerance and independent functional mobility. Recommending SNF upon discharge as patient functioning well below baseline, demonstrates good rehab potential and unable to return home due to burden of care beyond caregiver ability and home environment not conducive to patient recovery. Goals : To be met in 3 visits:  1). Independent with LE Ex x 10 reps    To be met in 6 visits:  1). Supine to/from sit: Independent  2). Sit to/from stand: Supervision  3). Bed to chair: Supervision  4). Gait: Ambulate 25 ft.   with  SBA and use of LRAD (least restrictive assistive device)  5). Tolerate B LE exercises 3 sets of 10-15 reps    Rehabilitation Potential: Fair  Strengths for achieving goals include:   Pt motivated and Family Support   Barriers to achieving goals include:    Complexity of condition    Plan    To be seen 2-3 x / week  while in acute care setting for therapeutic exercises, bed mobility, transfers, progressive gait training, balance training, and family/patient education. Signature: Antonio Friday, PT, DPT    If patient discharges from this facility prior to next visit, this note will serve as the Discharge Summary.

## 2021-10-26 NOTE — PROGRESS NOTES
Pulmonary Progress Note  CC: Acute Hypercarbic Respiratory Failure     Subjective:    BiPAP overnight  Appears comfortable. 2 L O2uses 2 L at home      EXAM:   BP 99/61   Pulse 66   Temp 98.5 °F (36.9 °C) (Axillary)   Resp 18   Ht 5' 1\" (1.549 m)   Wt 99 lb 6 oz (45.1 kg)   SpO2 97%   BMI 18.78 kg/m²  on 2.5 L  Constitutional:  No acute distress. Sherryle Moder HEENT: no scleral icterus  Neck: No tracheal deviation present. Cardiovascular: Normal heart sounds. Pulmonary/Chest: No wheezes. No rhonchi. L rales. No decreased breath sounds. No accessory muscle usage or stridor. Abdominal: Soft. Musculoskeletal: No cyanosis. No clubbing. Skin: Skin is warm and dry.          Scheduled Meds:   potassium chloride  40 mEq Oral Daily    magnesium oxide  400 mg Oral BID    predniSONE  20 mg Oral Daily    furosemide  20 mg Oral See Admin Instructions    atorvastatin  20 mg Oral Nightly    busPIRone  10 mg Oral TID    [Held by provider] clopidogrel  75 mg Oral Daily    ferrous sulfate  325 mg Oral Daily with breakfast    metoprolol tartrate  25 mg Oral BID    pantoprazole  20 mg Oral Daily    polyethylene glycol  17 g Oral BID    sertraline  50 mg Oral Daily    [Held by provider] enoxaparin  40 mg SubCUTAneous Daily    ipratropium-albuterol  1 ampule Inhalation Q4H WA    sodium chloride flush  10 mL IntraVENous 2 times per day     Continuous Infusions:   sodium chloride      sodium chloride Stopped (10/20/21 1340)    dextrose       PRN Meds:  sodium chloride, melatonin, ondansetron **OR** ondansetron, polyethylene glycol, acetaminophen **OR** acetaminophen, albuterol, sodium chloride flush, sodium chloride, glucose, dextrose, glucagon (rDNA), dextrose    Labs:  CBC:   Recent Labs     10/24/21  0429 10/24/21  1745 10/25/21  0418 10/25/21  1743 10/26/21  0434   WBC 6.4  --  7.8  --  6.8   HGB 9.3*   < > 9.3* 10.2* 9.1*   HCT 29.2*   < > 29.1* 31.6* 28.4*   MCV 94.3  --  93.6  --  95.1     --  229 --  210    < > = values in this interval not displayed. BMP:   Recent Labs     10/24/21  0429 10/25/21  0418 10/26/21  0434    140 144   K 3.4* 3.7 4.7    104 107   CO2 27 29 27   BUN 20 22* 19   CREATININE 0.6 0.7 0.8     Cultures:  9/16/2021 SARS-CoV-2  negative  9/16/2021 influenza A & B negative  10/17/2021 Blood cultures negative    Imaging:  Chest x-ray 10/25 imaging reviewed by me and showed  Improvement of patchy airspace disease  Small left pleural effusion      ASSESSMENT:  · Acute on chronic hypoxemic hypercapnic respiratory failure, baseline requirement 2.5 L O2, resolved  · Was just admitted 10/12/2021-10/14/2021 with respiratory failure, intubated during that admission  · ED 9/28/21 with respiratory failure  · Admitted 9/23/2021-9/27/2021  · Admitted 9/15/2021-9/23/2021  · Admitted 9/9/2021-9/14/2021  · Bilateral pleural effusions, L>R. Not significant on repeat CXR for thoracentesis   · COPD  · Seizure like activity  · CAD  · Chronic anxiety with panic attacks  · S/P COVID-19 vaccination      PLAN:  · Continue BiPAP HS and PRN;  on Trilogy AVAPS at [facility] and previously on Astral unit at home  · Inhaled bronchodilators  · Prednisone 20 mg, taper slowly  · Resume Plavix   · D/C planning is okay with pulmonary.

## 2021-10-27 PROBLEM — R77.8 ELEVATED TROPONIN: Status: RESOLVED | Noted: 2019-09-04 | Resolved: 2021-10-27

## 2021-10-27 PROBLEM — R79.89 ELEVATED TROPONIN: Status: RESOLVED | Noted: 2019-09-04 | Resolved: 2021-10-27

## 2021-10-30 NOTE — PROGRESS NOTES
Physician Progress Note      PATIENT:               Tereza Edwards  CSN #:                  [id number]  :                       1940  ADMIT DATE:       10/17/2021 5:32 PM  [address] [place] DATE:        10/26/2021 4:00 PM  RESPONDING  PROVIDER #:        Dontae Alvarez MD          QUERY TEXT:    Patient admitted with sepsis. Documentation reflects HCAp in H&P and was   dropped last progress note- No evidence of pneumonia now, pt has been on   multiple rounds of abx recently . If possible, please document in the   progress notes and discharge summary if pneumonia was: The medical record reflects the following:  Risk Factors: HCAP  Clinical Indicators: Noted - Acute on chronic hypoxic and hypercarbic   respiratory failure \" HCAP, R perihilar. Previous site of LLL appears improved   \" 10/20 progress note documented as \" Healthcare acquired pneumonia - no   suggestive symptoms, but Xray with new finding \" cefepime and vancomycin day   #4. And doxycycline day #3 , however pt has been on multiple rounds of abx   recently and no clear evidence of pna this time Wbc stable. Will stop abx    Based on above documentation  Treatment: *cefepime, vancomycin, doxycycline    Thank You Edward Nur RN, CDS [email or web address]  Options provided:  -- Pneumonia was treated in this admission  -- Pneumonia was ruled out after study  -- Other - I will add my own diagnosis  -- Disagree - Not applicable / Not valid  -- Disagree - Clinically unable to determine / Unknown  -- Refer to Clinical Documentation Reviewer    PROVIDER RESPONSE TEXT:    Pneumonia was treated in this admission.     Query created by: Rehana Maddox on 10/29/2021 2:32 PM      Electronically signed by:  Dontae Alvarez MD 10/30/2021 1:32 PM

## 2021-11-03 NOTE — PROGRESS NOTES
Physician Progress Note      PATIENT:               Leah Wick  CSN #:                  [id number]  :                       1940  ADMIT DATE:       10/17/2021 5:32 PM  [address] DATE:        10/26/2021 4:00 PM  RESPONDING  PROVIDER #:        Sayda Camacho MD          QUERY TEXT:    Patient admitted with sepsis per H&P. If possible, please document in the   progress notes and discharge summary if sepsis was: The medical record reflects the following:  Risk Factors: pneumonia, acute respiratory failure  Clinical Indicators: H&P- Sepsis (RR, HR.  PCT, lactic) lactic 3,   procalcitonin 0.27, WBC 9.7, HR 90, RR 22  Treatment: vancomycin, cefepime, doxycycline, O2 support, pulmonology, wound   care, and GI consult, 500 NS bolus given    Thank You Jt Manzo RN, CDS [email or web address]  Options provided:  -- Sepsis POA confirmed after study  -- Sepsis POA treated and resolved  -- Sepsis ruled out after study  -- Other - I will add my own diagnosis  -- Disagree - Not applicable / Not valid  -- Disagree - Clinically unable to determine / Unknown  -- Refer to Clinical Documentation Reviewer    PROVIDER RESPONSE TEXT:    Sepsis POA treated and resolved.     Query created by: Isidro Castro on 2021 9:01 AM      Electronically signed by:  Sayda Camacho MD 11/3/2021 4:40 PM

## 2021-11-28 NOTE — DISCHARGE SUMMARY
Hospital Medicine Discharge Summary    Patient ID: Leroy Mccray      Patient's PCP: Maria Luz Bhardwaj MD    Admit Date: 10/17/2021     Discharge Date: 10/26/2021     Admitting Physician: Bria Cardoso MD     Discharge Physician: Tyesha Fowler MD     Discharge Diagnoses: Active Hospital Problems    Diagnosis     Pleural effusion [J90]     HCAP (healthcare-associated pneumonia) [J18.9]     Acute on chronic respiratory failure with hypoxia and hypercapnia (HCC) [J96.21, J96.22]     Severe malnutrition ([address] [E43]     Primary hypertension [I10]        The patient was seen and examined on day of discharge and this discharge summary is in conjunction with any daily progress note from day of discharge. Hospital Course: The patient is a [de-identified] y.o. female with PMH below, presents with SOB, increased O2 demand, resp distress, frequent admissions for resp problems. Pt presents from SNF where she had marked hypoxia. Family at bedside reports pt's sats in 19's at SNF. She required NRB mask and also ambu bag for a short time per ED. She was was placed on BIPAP in the ED which she continues on. Limited ability to obtain additional Hx 2/2 pt being on BIPAP as it is difficult to hear/understand her. However, was able to determine pt is alert and oriented.         Acute on chronic hypoxic and hypercarbic respiratory failure. Due to  severe COPD with exacerbation   left pleural effusion   - seen by pulm   -BiPAP at night.  Currently on 2 L of oxygen. previously on AVAPS with home Trilogy.   -Continue prednisone . Paperwork from 40 mg daily-> 20 mg daily.  -was given cefepime and vancomycin and doxycycline for 4 days. No evidence of pneumonia now, pt has been on multiple rounds of abx recently . Wbc stable. Antibiotics stopped   - left pleural effusion - ongoing diuresis, planned for thoracocentesis on Monday.  holding Plavix    resumed PO lasix - patient only takes on Tuesday and Fridays at home. Reordered     Acute on chronic anemia  -Her hemoglobin is 8.1 on 10/13/2021  10.3--> 7.5--> 7.6 -->6.7-->7.6-->7.9--6.7  - given 1 unit PRBC on 10/20-->stable at 9.3-> 10.0 today  -Recent iron studies shows anemia of iron deficiency and chronic disease  -CT abd/pelvis  -reviewed . No acute bleed. Cholelithiasis . Pleural effusion  -Hemoccult stools - positive   -She had a colonoscopy 2/2020-showed hemorrhoids-Dr. Rehana Jackson  -Holding Plavix and Lovenox  for now  - GI consulted,   S/P c scope on 10/22 . 1. Single rectal polyp. Removed   2.  Internal hemorrhoids. 3.  Otherwise normal colon to cecum.     Hypokalemia -resolved  - replacement ordered  - monitor  Continue p.o. potassium     Hypomagnesemia  - 1.4, 2 gm replacement ordered  -monitor  - repeat ordered and stable   Continue p.o. mag      CAD  HTN  - cont lopressor, statin  - holding plavix 2 to drop in H/H     GERD  - cont PPI     severe anxiety  - cont home med  On Zoloft and BuSpar     Severe malnutrition  -Encourage nutritional supplements     Generalized weakness and debility  PT OT    Physical Exam Performed:     /78   Pulse 76   Temp 98.7 °F (37.1 °C) (Axillary)   Resp 20   Ht 5' 1\" (1.549 m) Comment: obtained 39\67\13  Wt 99 lb 6 oz (45.1 kg)   SpO2 99%   BMI 18.78 kg/m²       Gen: No distress. Alert. Appears chronically frail and chronically ill   awake, alert, oriented  Eyes: PERRL. No sclera icterus. No conjunctival injection. ENT: No discharge. Pharynx clear. Neck: No JVD. Trachea midline. Resp: No accessory muscle use. Diminished throughout. No crackles. No wheezes. No rhonchi. CV: Regular rate. Regular rhythm. No murmur. No rub. No edema. Capillary Refill: Brisk,< 3 seconds   Peripheral Pulses: +2 palpable, equal bilaterally   GI: Non-tender. Non-distended. Normal bowel sounds. Skin: Warm and dry. No nodule on exposed extremities. No rash on exposed extremities.  Scattered ecchymosis on BUE and BLE Left wrist wrapped with kerlex  M/S: No cyanosis. No joint deformity. No clubbing. Neuro: Awake. Grossly nonfocal    Psych: Oriented x 3. +anxiety      Labs: For convenience and continuity at follow-up the following most recent labs are provided:      CBC:    Lab Results   Component Value Date    WBC 6.8 10/26/2021    HGB 9.1 10/26/2021    HCT 28.4 10/26/2021     10/26/2021       Renal:    Lab Results   Component Value Date     10/26/2021    K 4.7 10/26/2021     10/26/2021    CO2 27 10/26/2021    BUN 19 10/26/2021    CREATININE 0.8 10/26/2021    CALCIUM 8.5 10/26/2021    PHOS 2.6 10/13/2021         Significant Diagnostic Studies    Radiology:   XR CHEST PORTABLE   Final Result   Interval improvement of patchy airspace opacities. Small left pleural effusion. CT ABDOMEN PELVIS WO CONTRAST Additional Contrast? None   Final Result   No significant acute abdominal abnormality. No retroperitoneal bleed. Very   small amount of free fluid in the right pelvis of uncertain etiology and   significance. Cholelithiasis without finding of acute cholecystitis. Large   left and moderate right pleural effusions with bibasilar atelectasis. Mild   anasarca. RECOMMENDATIONS:   If the patient is short of breath, ultrasound-guided thoracentesis would be   recommended for its diagnostic and therapeutic benefits. XR CHEST PORTABLE   Final Result   1. Patchy airspace disease, right perihilar region, suggesting pneumonia or   atelectasis   2. Small pleural effusions   3. Improved aeration present within the left lung base, consistent with   resolving atelectasis and or pneumonia   4.  Underlying interstitial pulmonary fibrosis                Consults:     IP CONSULT TO HOSPITALIST  IP CONSULT TO CRITICAL CARE  IP CONSULT TO PHARMACY  IP CONSULT TO PALLIATIVE CARE  IP CONSULT TO GI    Disposition:  NH    Condition at Discharge: Stable    Discharge Instructions/Follow-up:  pcp in 1 week    Code Status:  Prior Activity: activity as tolerated    Diet: regular diet      Discharge Medications:     Discharge Medication List as of 10/26/2021  3:41 PM           Details   !! LORazepam (ATIVAN) 0.5 MG tablet Take 1 tablet by mouth nightly as needed for Anxiety for up to 3 days. , Disp-3 tablet, R-0Print       !! - Potential duplicate medications found. Please discuss with provider. Details   !! LORazepam (ATIVAN) 0.5 MG tablet Take 0.5 mg by mouth nightly as needed. Historical Med      albuterol sulfate (PROAIR RESPICLICK) 003 (90 Base) MCG/ACT aerosol powder inhalation 2 puff: EVERY 6 HOURSHistorical Med      OXYGEN Inhale 2 L into the lungsHistorical Med      predniSONE (DELTASONE) 10 MG tablet 3 tabs a day for 5 days,  Then 2 tabs a day for 5 days ,  Then 1 tab daily, Disp-25 tablet, R-0NO PRINT      miconazole nitrate 2 % OINT Apply topically 2 times daily, Topical, 2 TIMES DAILY Starting Thu 10/14/2021, Disp-1 each, R-0, NO PRINT      busPIRone (BUSPAR) 10 MG tablet Take 10 mg by mouth 3 times daily Indications: Feeling AnxiousHistorical Med      ondansetron (ZOFRAN-ODT) 4 MG disintegrating tablet Take 1 tablet by mouth every 8 hours as needed for Nausea or VomitingDC to [facility]      furosemide (LASIX) 20 MG tablet Take 1 tablet by mouth See Admin Instructions Tuesday, fridayDC to [facility]      magnesium oxide (MAG-OX) 400 (241.3 Mg) MG TABS tablet Take 1 tablet by mouth dailyDC to SNF      potassium & sodium phosphates (PHOS-NAK) 280-160-250 MG PACK Take 1 packet by mouth daily, R-0DC to SNF      menthol-zinc oxide (CALMOSEPTINE) 0.44-20.6 % OINT ointment Apply topically 2 times daily as needed (Apply to buttocks until resolved) Max 30 ml per day., Topical, 2 TIMES DAILY PRN Starting Tue 9/14/2021, Disp-1 g, R-0, NO PRINT      melatonin 5 MG TBDP disintegrating tablet Take 1 tablet by mouth nightly as needed (sleep)OTC      fluticasone (FLONASE) 50 MCG/ACT nasal spray 1 spray by Each Nostril route daily as needed for Rhinitis, Disp-16 g, R-0NO PRINT      pantoprazole (PROTONIX) 20 MG tablet Take 20 mg by mouth dailyHistorical Med      ferrous sulfate (IRON 325) 325 (65 Fe) MG tablet Take 325 mg by mouth daily (with breakfast)Historical Med      Roflumilast (DALIRESP) 250 MCG tablet Take 250 mcg by mouth dailyHistorical Med      acetaminophen (TYLENOL) 500 MG tablet Take 500 mg by mouth every 6 hours as needed for PainHistorical Med      sertraline (ZOLOFT) 50 MG tablet Take 50 mg by mouth dailyHistorical Med      budesonide (PULMICORT) 0.5 MG/2ML nebulizer suspension Take 2 mLs by nebulization 2 times daily, Disp-60 ampule, R-3Normal      ipratropium-albuterol (DUONEB) 0.5-2.5 (3) MG/3ML SOLN nebulizer solution Inhale 3 mLs into the lungs every 6 hours, Disp-360 mL, R-0Normal      atorvastatin (LIPITOR) 20 MG tablet Take 1 tablet by mouth nightly, Disp-30 tablet, R-3Normal      metoprolol tartrate (LOPRESSOR) 25 MG tablet Take 1 tablet by mouth 2 times daily, Disp-60 tablet, R-3Normal      docusate sodium (COLACE, DULCOLAX) 100 MG CAPS Take 100 mg by mouth 2 times daily, Disp-30 capsule, R-0Normal      clopidogrel (PLAVIX) 75 MG tablet Take 1 tablet by mouth daily, Disp-30 tablet, R-3Print      tiotropium (SPIRIVA HANDIHALER) 18 MCG inhalation capsule Inhale 18 mcg into the lungs dailyHistorical Med      budesonide-formoterol (SYMBICORT) 160-4.5 MCG/ACT AERO Inhale 2 puffs into the lungs 2 times dailyHistorical Med      albuterol (PROVENTIL HFA) 108 (90 BASE) MCG/ACT inhaler Inhale 2 puffs into the lungs every 6 hours as needed for Wheezing or Shortness of Breath., Disp-1 Inhaler, R-2Print       !! - Potential duplicate medications found. Please discuss with provider. Time Spent on discharge is more than 30 minutes in the examination, evaluation, counseling and review of medications and discharge plan.       Signed:    Amanda Willingham MD   11/28/2021      Thank you Zurdo Lal MD for the opportunity to be involved in this patient's care. If you have any questions or concerns please feel free to contact me at [phone].

## 2022-01-10 ENCOUNTER — HOSPITAL ENCOUNTER (OUTPATIENT)
Age: 82
Setting detail: SPECIMEN
Discharge: HOME | End: 2022-01-10
Payer: MEDICARE

## 2022-01-10 LAB
BASOPHILS ABSOLUTE: 0 K/UL (ref 0–0.2)
BASOPHILS RELATIVE PERCENT: 0.5 %
EOSINOPHILS ABSOLUTE: 0.1 K/UL (ref 0–0.6)
EOSINOPHILS RELATIVE PERCENT: 1.3 %
HCT VFR BLD CALC: 37.3 % (ref 36–48)
HEMOGLOBIN: 11.9 G/DL (ref 12–16)
LYMPHOCYTES ABSOLUTE: 1.2 K/UL (ref 1–5.1)
LYMPHOCYTES RELATIVE PERCENT: 18 %
MCH RBC QN AUTO: 31.2 PG (ref 26–34)
MCHC RBC AUTO-ENTMCNC: 31.8 G/DL (ref 31–36)
MCV RBC AUTO: 98 FL (ref 80–100)
MONOCYTES ABSOLUTE: 0.3 K/UL (ref 0–1.3)
MONOCYTES RELATIVE PERCENT: 3.8 %
NEUTROPHILS ABSOLUTE: 5 K/UL (ref 1.7–7.7)
NEUTROPHILS RELATIVE PERCENT: 76.4 %
PDW BLD-RTO: 14.8 % (ref 12.4–15.4)
PLATELET # BLD: 346 K/UL (ref 135–450)
PMV BLD AUTO: 7.9 FL (ref 5–10.5)
RBC # BLD: 3.8 M/UL (ref 4–5.2)
WBC # BLD: 6.6 K/UL (ref 4–11)

## 2022-01-10 PROCEDURE — 80061 LIPID PANEL: CPT

## 2022-01-10 PROCEDURE — 85025 COMPLETE CBC W/AUTO DIFF WBC: CPT

## 2022-01-10 PROCEDURE — 83735 ASSAY OF MAGNESIUM: CPT

## 2022-01-10 PROCEDURE — 80053 COMPREHEN METABOLIC PANEL: CPT

## 2022-01-10 PROCEDURE — 84443 ASSAY THYROID STIM HORMONE: CPT

## 2022-01-10 PROCEDURE — 83036 HEMOGLOBIN GLYCOSYLATED A1C: CPT

## 2022-01-10 PROCEDURE — 84134 ASSAY OF PREALBUMIN: CPT

## 2022-01-10 PROCEDURE — 36415 COLL VENOUS BLD VENIPUNCTURE: CPT

## 2022-01-10 PROCEDURE — 83550 IRON BINDING TEST: CPT

## 2022-01-10 PROCEDURE — 83540 ASSAY OF IRON: CPT

## 2022-01-10 PROCEDURE — 84439 ASSAY OF FREE THYROXINE: CPT

## 2022-01-11 LAB
A/G RATIO: 1.6 (ref 1.1–2.2)
ALBUMIN SERPL-MCNC: 4.2 G/DL (ref 3.4–5)
ALP BLD-CCNC: 80 U/L (ref 40–129)
ALT SERPL-CCNC: 8 U/L (ref 10–40)
ANION GAP SERPL CALCULATED.3IONS-SCNC: 15 MMOL/L (ref 3–16)
AST SERPL-CCNC: 17 U/L (ref 15–37)
BILIRUB SERPL-MCNC: <0.2 MG/DL (ref 0–1)
BUN BLDV-MCNC: 25 MG/DL (ref 7–20)
CALCIUM SERPL-MCNC: 9.8 MG/DL (ref 8.3–10.6)
CHLORIDE BLD-SCNC: 100 MMOL/L (ref 99–110)
CHOLESTEROL, TOTAL: 183 MG/DL (ref 0–199)
CO2: 26 MMOL/L (ref 21–32)
CREAT SERPL-MCNC: 1.3 MG/DL (ref 0.6–1.2)
ESTIMATED AVERAGE GLUCOSE: 108.3 MG/DL
GFR AFRICAN AMERICAN: 48
GFR NON-AFRICAN AMERICAN: 39
GLUCOSE BLD-MCNC: 104 MG/DL (ref 70–99)
HBA1C MFR BLD: 5.4 %
HDLC SERPL-MCNC: 65 MG/DL (ref 40–60)
IRON SATURATION: 17 % (ref 15–50)
IRON: 57 UG/DL (ref 37–145)
LDL CHOLESTEROL CALCULATED: 88 MG/DL
MAGNESIUM: 2 MG/DL (ref 1.8–2.4)
POTASSIUM SERPL-SCNC: 5.3 MMOL/L (ref 3.5–5.1)
PREALBUMIN: 32.5 MG/DL (ref 20–40)
SODIUM BLD-SCNC: 141 MMOL/L (ref 136–145)
T4 FREE: 0.3 NG/DL (ref 0.9–1.8)
TOTAL IRON BINDING CAPACITY: 334 UG/DL (ref 260–445)
TOTAL PROTEIN: 6.9 G/DL (ref 6.4–8.2)
TRIGL SERPL-MCNC: 148 MG/DL (ref 0–150)
VLDLC SERPL CALC-MCNC: 30 MG/DL

## 2022-01-12 LAB — TSH SERPL DL<=0.05 MIU/L-ACNC: 96.71 UIU/ML (ref 0.27–4.2)

## 2022-02-09 NOTE — PROGRESS NOTES
Pulmonary Progress Note    CC: Pneumonia    Subjective:   Continues to improve  No sputum production          Intake/Output Summary (Last 24 hours) at 3/21/2019 0730  Last data filed at 3/21/2019 0533  Gross per 24 hour   Intake 740 ml   Output --   Net 740 ml       Exam:   BP (!) 142/78   Pulse 97   Temp 97 °F (36.1 °C) (Oral)   Resp 18   Ht 5' 4\" (1.626 m)   Wt 110 lb 12.8 oz (50.3 kg)   SpO2 97%   BMI 19.02 kg/m²  on 2 L O2  Gen: No distress. Eyes: PERRL. No sclera icterus. No conjunctival injection. ENT: No discharge. Pharynx clear. Neck: Trachea midline. No obvious mass. Resp:  No accessory muscle use. Minimal right-sided  crackles. Few scattered wheezes. No rhonchi. No dullness on percussion. CV: Regular rate. Regular rhythm. No murmur or rub. No edema. GI: Non-tender. Non-distended. No hernia. Skin: Warm and dry. No nodule on exposed extremities. Lymph: No cervical LAD. No supraclavicular LAD. M/S: No cyanosis. No joint deformity. No clubbing. Neuro: Awake. Alert. Moves all four extremities. Psych: Oriented x 3. No anxiety.          Scheduled Meds:   predniSONE  40 mg Oral Daily    ipratropium-albuterol  1 ampule Inhalation Q4H    guaiFENesin  600 mg Oral BID    levothyroxine  25 mcg Oral Daily    simvastatin  20 mg Oral Nightly    sodium chloride flush  10 mL Intravenous 2 times per day    enoxaparin  40 mg Subcutaneous Daily    azithromycin  500 mg Intravenous Q24H    And    cefTRIAXone (ROCEPHIN) IV  1 g Intravenous Q24H    nicotine  1 patch Transdermal Daily     Continuous Infusions:    PRN Meds:  HYDROcodone 5 mg - acetaminophen, labetalol, sodium chloride flush, magnesium hydroxide, ondansetron, albuterol    Labs:  CBC:   Recent Labs     03/18/19 1835 03/19/19  0634   WBC 9.7 5.2   HGB 15.7 13.0   HCT 47.0 39.0   MCV 95.1 95.3    202     BMP:   Recent Labs     03/18/19 1835 03/19/19  0634    140   K 4.6 4.6   CL 96* 106   CO2 33* 25   BUN 25* 24* Isotretinoin Pregnancy And Lactation Text: This medication is Pregnancy Category X and is considered extremely dangerous during pregnancy. It is unknown if it is excreted in breast milk. Minocycline Counseling: Patient advised regarding possible photosensitivity and discoloration of the teeth, skin, lips, tongue and gums.  Patient instructed to avoid sunlight, if possible.  When exposed to sunlight, patients should wear protective clothing, sunglasses, and sunscreen.  The patient was instructed to call the office immediately if the following severe adverse effects occur:  hearing changes, easy bruising/bleeding, severe headache, or vision changes.  The patient verbalized understanding of the proper use and possible adverse effects of minocycline.  All of the patient's questions and concerns were addressed. Spironolactone Counseling: Patient advised regarding risks of diarrhea, abdominal pain, hyperkalemia, birth defects (for female patients), liver toxicity and renal toxicity. The patient may need blood work to monitor liver and kidney function and potassium levels while on therapy. The patient verbalized understanding of the proper use and possible adverse effects of spironolactone.  All of the patient's questions and concerns were addressed. Tazorac Pregnancy And Lactation Text: This medication is not safe during pregnancy. It is unknown if this medication is excreted in breast milk. Topical Clindamycin Pregnancy And Lactation Text: This medication is Pregnancy Category B and is considered safe during pregnancy. It is unknown if it is excreted in breast milk. Tazorac Counseling:  Patient advised that medication is irritating and drying.  Patient may need to apply sparingly and wash off after an hour before eventually leaving it on overnight.  The patient verbalized understanding of the proper use and possible adverse effects of tazorac.  All of the patient's questions and concerns were addressed. Minocycline Pregnancy And Lactation Text: This medication is Pregnancy Category D and not consider safe during pregnancy. It is also excreted in breast milk. Azithromycin Pregnancy And Lactation Text: This medication is considered safe during pregnancy and is also secreted in breast milk. Azithromycin Counseling:  I discussed with the patient the risks of azithromycin including but not limited to GI upset, allergic reaction, drug rash, diarrhea, and yeast infections. Topical Retinoid Pregnancy And Lactation Text: This medication is Pregnancy Category C. It is unknown if this medication is excreted in breast milk. Birth Control Pills Counseling: Birth Control Pill Counseling: I discussed with the patient the potential side effects of OCPs including but not limited to increased risk of stroke, heart attack, thrombophlebitis, deep venous thrombosis, hepatic adenomas, breast changes, GI upset, headaches, and depression.  The patient verbalized understanding of the proper use and possible adverse effects of OCPs. All of the patient's questions and concerns were addressed. Erythromycin Pregnancy And Lactation Text: This medication is Pregnancy Category B and is considered safe during pregnancy. It is also excreted in breast milk. Use Enhanced Medication Counseling?: No Sarecycline Counseling: Patient advised regarding possible photosensitivity and discoloration of the teeth, skin, lips, tongue and gums.  Patient instructed to avoid sunlight, if possible.  When exposed to sunlight, patients should wear protective clothing, sunglasses, and sunscreen.  The patient was instructed to call the office immediately if the following severe adverse effects occur:  hearing changes, easy bruising/bleeding, severe headache, or vision changes.  The patient verbalized understanding of the proper use and possible adverse effects of sarecycline.  All of the patient's questions and concerns were addressed. Bactrim Pregnancy And Lactation Text: This medication is Pregnancy Category D and is known to cause fetal risk.  It is also excreted in breast milk. Topical Sulfur Applications Pregnancy And Lactation Text: This medication is Pregnancy Category C and has an unknown safety profile during pregnancy. It is unknown if this topical medication is excreted in breast milk. Birth Control Pills Pregnancy And Lactation Text: This medication should be avoided if pregnant and for the first 30 days post-partum. Spironolactone Pregnancy And Lactation Text: This medication can cause feminization of the male fetus and should be avoided during pregnancy. The active metabolite is also found in breast milk. Bactrim Counseling:  I discussed with the patient the risks of sulfa antibiotics including but not limited to GI upset, allergic reaction, drug rash, diarrhea, dizziness, photosensitivity, and yeast infections.  Rarely, more serious reactions can occur including but not limited to aplastic anemia, agranulocytosis, methemoglobinemia, blood dyscrasias, liver or kidney failure, lung infiltrates or desquamative/blistering drug rashes. Detail Level: Zone High Dose Vitamin A Counseling: Side effects reviewed, pt to contact office should one occur. Tetracycline Counseling: Patient counseled regarding possible photosensitivity and increased risk for sunburn.  Patient instructed to avoid sunlight, if possible.  When exposed to sunlight, patients should wear protective clothing, sunglasses, and sunscreen.  The patient was instructed to call the office immediately if the following severe adverse effects occur:  hearing changes, easy bruising/bleeding, severe headache, or vision changes.  The patient verbalized understanding of the proper use and possible adverse effects of tetracycline.  All of the patient's questions and concerns were addressed. Patient understands to avoid pregnancy while on therapy due to potential birth defects. Benzoyl Peroxide Pregnancy And Lactation Text: This medication is Pregnancy Category C. It is unknown if benzoyl peroxide is excreted in breast milk. Topical Sulfur Applications Counseling: Topical Sulfur Counseling: Patient counseled that this medication may cause skin irritation or allergic reactions.  In the event of skin irritation, the patient was advised to reduce the amount of the drug applied or use it less frequently.   The patient verbalized understanding of the proper use and possible adverse effects of topical sulfur application.  All of the patient's questions and concerns were addressed. High Dose Vitamin A Pregnancy And Lactation Text: High dose vitamin A therapy is contraindicated during pregnancy and breast feeding. Doxycycline Counseling:  Patient counseled regarding possible photosensitivity and increased risk for sunburn.  Patient instructed to avoid sunlight, if possible.  When exposed to sunlight, patients should wear protective clothing, sunglasses, and sunscreen.  The patient was instructed to call the office immediately if the following severe adverse effects occur:  hearing changes, easy bruising/bleeding, severe headache, or vision changes.  The patient verbalized understanding of the proper use and possible adverse effects of doxycycline.  All of the patient's questions and concerns were addressed. Doxycycline Pregnancy And Lactation Text: This medication is Pregnancy Category D and not consider safe during pregnancy. It is also excreted in breast milk but is considered safe for shorter treatment courses. Benzoyl Peroxide Counseling: Patient counseled that medicine may cause skin irritation and bleach clothing.  In the event of skin irritation, the patient was advised to reduce the amount of the drug applied or use it less frequently.   The patient verbalized understanding of the proper use and possible adverse effects of benzoyl peroxide.  All of the patient's questions and concerns were addressed. Dapsone Counseling: I discussed with the patient the risks of dapsone including but not limited to hemolytic anemia, agranulocytosis, rashes, methemoglobinemia, kidney failure, peripheral neuropathy, headaches, GI upset, and liver toxicity.  Patients who start dapsone require monitoring including baseline LFTs and weekly CBCs for the first month, then every month thereafter.  The patient verbalized understanding of the proper use and possible adverse effects of dapsone.  All of the patient's questions and concerns were addressed. Topical Retinoid counseling:  Patient advised to apply a pea-sized amount only at bedtime and wait 30 minutes after washing their face before applying.  If too drying, patient may add a non-comedogenic moisturizer. The patient verbalized understanding of the proper use and possible adverse effects of retinoids.  All of the patient's questions and concerns were addressed. Isotretinoin Counseling: Patient should get monthly blood tests, not donate blood, not drive at night if vision affected, not share medication, and not undergo elective surgery for 6 months after tx completed. Side effects reviewed, pt to contact office should one occur. Dapsone Pregnancy And Lactation Text: This medication is Pregnancy Category C and is not considered safe during pregnancy or breast feeding. Erythromycin Counseling:  I discussed with the patient the risks of erythromycin including but not limited to GI upset, allergic reaction, drug rash, diarrhea, increase in liver enzymes, and yeast infections. Topical Clindamycin Counseling: Patient counseled that this medication may cause skin irritation or allergic reactions.  In the event of skin irritation, the patient was advised to reduce the amount of the drug applied or use it less frequently.   The patient verbalized understanding of the proper use and possible adverse effects of clindamycin.  All of the patient's questions and concerns were addressed.

## 2022-03-09 ENCOUNTER — HOSPITAL ENCOUNTER (OUTPATIENT)
Age: 82
Discharge: HOME | End: 2022-03-09
Payer: MEDICARE

## 2022-03-09 LAB
BILIRUBIN URINE: NEGATIVE
BLOOD, URINE: NEGATIVE
C DIFF TOXIN/ANTIGEN: NORMAL
CLARITY: CLEAR
COLOR: YELLOW
GLUCOSE URINE: NEGATIVE MG/DL
KETONES, URINE: NEGATIVE MG/DL
LEUKOCYTE ESTERASE, URINE: NEGATIVE
MICROSCOPIC EXAMINATION: NORMAL
NITRITE, URINE: NEGATIVE
PH UA: 6 (ref 5–8)
PROTEIN UA: NEGATIVE MG/DL
SPECIFIC GRAVITY UA: 1.02 (ref 1–1.03)
URINE TYPE: NORMAL
UROBILINOGEN, URINE: 0.2 E.U./DL

## 2022-03-09 PROCEDURE — 87086 URINE CULTURE/COLONY COUNT: CPT

## 2022-03-09 PROCEDURE — 87324 CLOSTRIDIUM AG IA: CPT

## 2022-03-09 PROCEDURE — 87449 NOS EACH ORGANISM AG IA: CPT

## 2022-03-09 PROCEDURE — 87493 C DIFF AMPLIFIED PROBE: CPT

## 2022-03-09 PROCEDURE — 81003 URINALYSIS AUTO W/O SCOPE: CPT

## 2022-03-10 LAB
C. DIFFICILE TOXIN MOLECULAR: ABNORMAL
ORGANISM: ABNORMAL

## 2022-03-11 LAB — URINE CULTURE, ROUTINE: NORMAL

## 2022-03-30 ENCOUNTER — HOSPITAL ENCOUNTER (INPATIENT)
Age: 82
LOS: 10 days | Discharge: NURSING, REHAB OR LONG-TERM CARE | DRG: 871 | End: 2022-04-09
Attending: STUDENT IN AN ORGANIZED HEALTH CARE EDUCATION/TRAINING PROGRAM | Admitting: INTERNAL MEDICINE
Payer: MEDICARE

## 2022-03-30 ENCOUNTER — APPOINTMENT (OUTPATIENT)
Dept: GENERAL RADIOLOGY | Age: 82
DRG: 871 | End: 2022-03-30
Payer: MEDICARE

## 2022-03-30 DIAGNOSIS — N39.0 URINARY TRACT INFECTION IN FEMALE: ICD-10-CM

## 2022-03-30 DIAGNOSIS — R79.89 ELEVATED BRAIN NATRIURETIC PEPTIDE (BNP) LEVEL: ICD-10-CM

## 2022-03-30 DIAGNOSIS — N17.9 AKI (ACUTE KIDNEY INJURY) (HCC): ICD-10-CM

## 2022-03-30 DIAGNOSIS — R77.8 ELEVATED TROPONIN: ICD-10-CM

## 2022-03-30 DIAGNOSIS — A41.9 SEPTICEMIA (HCC): Primary | ICD-10-CM

## 2022-03-30 DIAGNOSIS — J96.21 ACUTE ON CHRONIC RESPIRATORY FAILURE WITH HYPOXIA (HCC): ICD-10-CM

## 2022-03-30 LAB
A/G RATIO: 1 (ref 1.1–2.2)
ALBUMIN SERPL-MCNC: 3.1 G/DL (ref 3.4–5)
ALP BLD-CCNC: 89 U/L (ref 40–129)
ALT SERPL-CCNC: 7 U/L (ref 10–40)
ANION GAP SERPL CALCULATED.3IONS-SCNC: 17 MMOL/L (ref 3–16)
AST SERPL-CCNC: 17 U/L (ref 15–37)
BACTERIA: ABNORMAL /HPF
BASE EXCESS VENOUS: 4.4 MMOL/L (ref -3–3)
BASOPHILS ABSOLUTE: 0 K/UL (ref 0–0.2)
BASOPHILS RELATIVE PERCENT: 0.1 %
BILIRUB SERPL-MCNC: 0.4 MG/DL (ref 0–1)
BILIRUBIN URINE: NEGATIVE
BLOOD, URINE: ABNORMAL
BUN BLDV-MCNC: 35 MG/DL (ref 7–20)
CALCIUM SERPL-MCNC: 9.5 MG/DL (ref 8.3–10.6)
CARBOXYHEMOGLOBIN: 1.5 % (ref 0–1.5)
CHLORIDE BLD-SCNC: 95 MMOL/L (ref 99–110)
CLARITY: ABNORMAL
CO2: 26 MMOL/L (ref 21–32)
COLOR: YELLOW
CREAT SERPL-MCNC: 2.1 MG/DL (ref 0.6–1.2)
EKG ATRIAL RATE: 90 BPM
EKG DIAGNOSIS: NORMAL
EKG P AXIS: 73 DEGREES
EKG P-R INTERVAL: 164 MS
EKG Q-T INTERVAL: 414 MS
EKG QRS DURATION: 86 MS
EKG QTC CALCULATION (BAZETT): 506 MS
EKG R AXIS: -63 DEGREES
EKG T AXIS: 12 DEGREES
EKG VENTRICULAR RATE: 90 BPM
EOSINOPHILS ABSOLUTE: 0 K/UL (ref 0–0.6)
EOSINOPHILS RELATIVE PERCENT: 0 %
EPITHELIAL CELLS, UA: ABNORMAL /HPF (ref 0–5)
GFR AFRICAN AMERICAN: 27
GFR NON-AFRICAN AMERICAN: 23
GLUCOSE BLD-MCNC: 135 MG/DL (ref 70–99)
GLUCOSE URINE: NEGATIVE MG/DL
HCO3 VENOUS: 28.7 MMOL/L (ref 23–29)
HCT VFR BLD CALC: 36 % (ref 36–48)
HEMOGLOBIN: 11.6 G/DL (ref 12–16)
INFLUENZA A: NOT DETECTED
INFLUENZA B: NOT DETECTED
KETONES, URINE: NEGATIVE MG/DL
LACTIC ACID, SEPSIS: 1.3 MMOL/L (ref 0.4–1.9)
LACTIC ACID, SEPSIS: 2.1 MMOL/L (ref 0.4–1.9)
LEUKOCYTE ESTERASE, URINE: ABNORMAL
LYMPHOCYTES ABSOLUTE: 0.6 K/UL (ref 1–5.1)
LYMPHOCYTES RELATIVE PERCENT: 2.1 %
MAGNESIUM: 1.5 MG/DL (ref 1.8–2.4)
MCH RBC QN AUTO: 29.5 PG (ref 26–34)
MCHC RBC AUTO-ENTMCNC: 32.3 G/DL (ref 31–36)
MCV RBC AUTO: 91.4 FL (ref 80–100)
METHEMOGLOBIN VENOUS: 0.2 %
MICROSCOPIC EXAMINATION: YES
MONOCYTES ABSOLUTE: 1.5 K/UL (ref 0–1.3)
MONOCYTES RELATIVE PERCENT: 5.2 %
NEUTROPHILS ABSOLUTE: 26.1 K/UL (ref 1.7–7.7)
NEUTROPHILS RELATIVE PERCENT: 92.6 %
NITRITE, URINE: POSITIVE
O2 CONTENT, VEN: 18 VOL %
O2 SAT, VEN: 99 %
O2 THERAPY: ABNORMAL
PCO2, VEN: 41.7 MMHG (ref 40–50)
PDW BLD-RTO: 13.3 % (ref 12.4–15.4)
PH UA: 6 (ref 5–8)
PH VENOUS: 7.46 (ref 7.35–7.45)
PLATELET # BLD: 312 K/UL (ref 135–450)
PMV BLD AUTO: 7.7 FL (ref 5–10.5)
PO2, VEN: 122.8 MMHG (ref 25–40)
POTASSIUM REFLEX MAGNESIUM: 3.6 MMOL/L (ref 3.5–5.1)
PRO-BNP: ABNORMAL PG/ML (ref 0–449)
PROCALCITONIN: >100 NG/ML (ref 0–0.15)
PROTEIN UA: 100 MG/DL
RBC # BLD: 3.94 M/UL (ref 4–5.2)
RBC UA: ABNORMAL /HPF (ref 0–4)
RENAL EPITHELIAL, UA: ABNORMAL /HPF (ref 0–1)
SARS-COV-2 RNA, RT PCR: NOT DETECTED
SODIUM BLD-SCNC: 138 MMOL/L (ref 136–145)
SPECIFIC GRAVITY UA: 1.01 (ref 1–1.03)
TCO2 CALC VENOUS: 30 MMOL/L
TOTAL PROTEIN: 6.2 G/DL (ref 6.4–8.2)
TROPONIN: 0.12 NG/ML
TROPONIN: 0.16 NG/ML
TROPONIN: 0.33 NG/ML
URINE REFLEX TO CULTURE: YES
URINE TYPE: ABNORMAL
UROBILINOGEN, URINE: 0.2 E.U./DL
WBC # BLD: 28.2 K/UL (ref 4–11)
WBC UA: ABNORMAL /HPF (ref 0–5)

## 2022-03-30 PROCEDURE — 99284 EMERGENCY DEPT VISIT MOD MDM: CPT

## 2022-03-30 PROCEDURE — 83735 ASSAY OF MAGNESIUM: CPT

## 2022-03-30 PROCEDURE — 36415 COLL VENOUS BLD VENIPUNCTURE: CPT

## 2022-03-30 PROCEDURE — 2500000003 HC RX 250 WO HCPCS

## 2022-03-30 PROCEDURE — 85025 COMPLETE CBC W/AUTO DIFF WBC: CPT

## 2022-03-30 PROCEDURE — 93005 ELECTROCARDIOGRAM TRACING: CPT | Performed by: INTERNAL MEDICINE

## 2022-03-30 PROCEDURE — 6360000002 HC RX W HCPCS: Performed by: INTERNAL MEDICINE

## 2022-03-30 PROCEDURE — 83880 ASSAY OF NATRIURETIC PEPTIDE: CPT

## 2022-03-30 PROCEDURE — 6360000002 HC RX W HCPCS: Performed by: PHYSICIAN ASSISTANT

## 2022-03-30 PROCEDURE — 6370000000 HC RX 637 (ALT 250 FOR IP)

## 2022-03-30 PROCEDURE — 2580000003 HC RX 258: Performed by: INTERNAL MEDICINE

## 2022-03-30 PROCEDURE — 94761 N-INVAS EAR/PLS OXIMETRY MLT: CPT

## 2022-03-30 PROCEDURE — 87186 SC STD MICRODIL/AGAR DIL: CPT

## 2022-03-30 PROCEDURE — 2580000003 HC RX 258: Performed by: NURSE PRACTITIONER

## 2022-03-30 PROCEDURE — 87150 DNA/RNA AMPLIFIED PROBE: CPT

## 2022-03-30 PROCEDURE — 87636 SARSCOV2 & INF A&B AMP PRB: CPT

## 2022-03-30 PROCEDURE — 84145 PROCALCITONIN (PCT): CPT

## 2022-03-30 PROCEDURE — 6370000000 HC RX 637 (ALT 250 FOR IP): Performed by: NURSE PRACTITIONER

## 2022-03-30 PROCEDURE — 2000000000 HC ICU R&B

## 2022-03-30 PROCEDURE — 82803 BLOOD GASES ANY COMBINATION: CPT

## 2022-03-30 PROCEDURE — 2580000003 HC RX 258: Performed by: PHYSICIAN ASSISTANT

## 2022-03-30 PROCEDURE — 84484 ASSAY OF TROPONIN QUANT: CPT

## 2022-03-30 PROCEDURE — 87040 BLOOD CULTURE FOR BACTERIA: CPT

## 2022-03-30 PROCEDURE — 6360000002 HC RX W HCPCS: Performed by: NURSE PRACTITIONER

## 2022-03-30 PROCEDURE — 94640 AIRWAY INHALATION TREATMENT: CPT

## 2022-03-30 PROCEDURE — 94660 CPAP INITIATION&MGMT: CPT

## 2022-03-30 PROCEDURE — 81001 URINALYSIS AUTO W/SCOPE: CPT

## 2022-03-30 PROCEDURE — 83605 ASSAY OF LACTIC ACID: CPT

## 2022-03-30 PROCEDURE — 96365 THER/PROPH/DIAG IV INF INIT: CPT

## 2022-03-30 PROCEDURE — 87088 URINE BACTERIA CULTURE: CPT

## 2022-03-30 PROCEDURE — 93005 ELECTROCARDIOGRAM TRACING: CPT | Performed by: PHYSICIAN ASSISTANT

## 2022-03-30 PROCEDURE — 2700000000 HC OXYGEN THERAPY PER DAY

## 2022-03-30 PROCEDURE — 2500000003 HC RX 250 WO HCPCS: Performed by: NURSE PRACTITIONER

## 2022-03-30 PROCEDURE — 80053 COMPREHEN METABOLIC PANEL: CPT

## 2022-03-30 PROCEDURE — 93010 ELECTROCARDIOGRAM REPORT: CPT | Performed by: INTERNAL MEDICINE

## 2022-03-30 PROCEDURE — 87086 URINE CULTURE/COLONY COUNT: CPT

## 2022-03-30 PROCEDURE — 71045 X-RAY EXAM CHEST 1 VIEW: CPT

## 2022-03-30 RX ORDER — METOPROLOL TARTRATE 5 MG/5ML
INJECTION INTRAVENOUS
Status: COMPLETED
Start: 2022-03-30 | End: 2022-03-30

## 2022-03-30 RX ORDER — POLYETHYLENE GLYCOL 3350 17 G/17G
17 POWDER, FOR SOLUTION ORAL DAILY PRN
Status: DISCONTINUED | OUTPATIENT
Start: 2022-03-30 | End: 2022-04-09 | Stop reason: HOSPADM

## 2022-03-30 RX ORDER — ACETAMINOPHEN 500 MG
TABLET ORAL
Status: COMPLETED
Start: 2022-03-30 | End: 2022-03-30

## 2022-03-30 RX ORDER — IPRATROPIUM BROMIDE AND ALBUTEROL SULFATE 2.5; .5 MG/3ML; MG/3ML
3 SOLUTION RESPIRATORY (INHALATION) EVERY 6 HOURS
Status: DISCONTINUED | OUTPATIENT
Start: 2022-03-30 | End: 2022-03-30

## 2022-03-30 RX ORDER — ONDANSETRON 2 MG/ML
4 INJECTION INTRAMUSCULAR; INTRAVENOUS EVERY 6 HOURS PRN
Status: DISCONTINUED | OUTPATIENT
Start: 2022-03-30 | End: 2022-04-09 | Stop reason: HOSPADM

## 2022-03-30 RX ORDER — BUSPIRONE HYDROCHLORIDE 10 MG/1
TABLET ORAL
Status: COMPLETED
Start: 2022-03-30 | End: 2022-03-30

## 2022-03-30 RX ORDER — PANTOPRAZOLE SODIUM 20 MG/1
20 TABLET, DELAYED RELEASE ORAL DAILY
Status: DISCONTINUED | OUTPATIENT
Start: 2022-03-30 | End: 2022-03-30

## 2022-03-30 RX ORDER — PANTOPRAZOLE SODIUM 40 MG/1
40 TABLET, DELAYED RELEASE ORAL DAILY
Status: DISCONTINUED | OUTPATIENT
Start: 2022-03-31 | End: 2022-04-05

## 2022-03-30 RX ORDER — ACETAMINOPHEN 500 MG
1000 TABLET ORAL ONCE
Status: COMPLETED | OUTPATIENT
Start: 2022-03-30 | End: 2022-03-30

## 2022-03-30 RX ORDER — FUROSEMIDE 20 MG/1
20 TABLET ORAL
Status: DISCONTINUED | OUTPATIENT
Start: 2022-04-01 | End: 2022-03-31

## 2022-03-30 RX ORDER — METOPROLOL TARTRATE 5 MG/5ML
2.5 INJECTION INTRAVENOUS ONCE
Status: COMPLETED | OUTPATIENT
Start: 2022-03-30 | End: 2022-03-30

## 2022-03-30 RX ORDER — CLOPIDOGREL BISULFATE 75 MG/1
75 TABLET ORAL DAILY
Status: DISCONTINUED | OUTPATIENT
Start: 2022-03-30 | End: 2022-04-09 | Stop reason: HOSPADM

## 2022-03-30 RX ORDER — IPRATROPIUM BROMIDE AND ALBUTEROL SULFATE 2.5; .5 MG/3ML; MG/3ML
3 SOLUTION RESPIRATORY (INHALATION) 4 TIMES DAILY
Status: DISCONTINUED | OUTPATIENT
Start: 2022-03-31 | End: 2022-03-31

## 2022-03-30 RX ORDER — 0.9 % SODIUM CHLORIDE 0.9 %
1000 INTRAVENOUS SOLUTION INTRAVENOUS ONCE
Status: COMPLETED | OUTPATIENT
Start: 2022-03-30 | End: 2022-03-30

## 2022-03-30 RX ORDER — ATORVASTATIN CALCIUM 10 MG/1
20 TABLET, FILM COATED ORAL NIGHTLY
Status: DISCONTINUED | OUTPATIENT
Start: 2022-03-30 | End: 2022-03-31

## 2022-03-30 RX ORDER — MAGNESIUM SULFATE IN WATER 40 MG/ML
2000 INJECTION, SOLUTION INTRAVENOUS ONCE
Status: COMPLETED | OUTPATIENT
Start: 2022-03-30 | End: 2022-03-31

## 2022-03-30 RX ORDER — BUSPIRONE HYDROCHLORIDE 5 MG/1
5 TABLET ORAL 3 TIMES DAILY
Status: DISCONTINUED | OUTPATIENT
Start: 2022-03-30 | End: 2022-04-01

## 2022-03-30 RX ORDER — FERROUS SULFATE 325(65) MG
325 TABLET ORAL
Status: DISCONTINUED | OUTPATIENT
Start: 2022-03-31 | End: 2022-04-09 | Stop reason: HOSPADM

## 2022-03-30 RX ORDER — HEPARIN SODIUM 5000 [USP'U]/ML
5000 INJECTION, SOLUTION INTRAVENOUS; SUBCUTANEOUS 2 TIMES DAILY
Status: DISCONTINUED | OUTPATIENT
Start: 2022-03-30 | End: 2022-04-02 | Stop reason: ALTCHOICE

## 2022-03-30 RX ORDER — SODIUM CHLORIDE 9 MG/ML
INJECTION, SOLUTION INTRAVENOUS PRN
Status: DISCONTINUED | OUTPATIENT
Start: 2022-03-30 | End: 2022-04-09 | Stop reason: HOSPADM

## 2022-03-30 RX ORDER — PREDNISONE 1 MG/1
5 TABLET ORAL DAILY
COMMUNITY

## 2022-03-30 RX ORDER — PANTOPRAZOLE SODIUM 40 MG/1
40 TABLET, DELAYED RELEASE ORAL DAILY
COMMUNITY

## 2022-03-30 RX ORDER — ACETAMINOPHEN 325 MG/1
650 TABLET ORAL EVERY 6 HOURS PRN
Status: DISCONTINUED | OUTPATIENT
Start: 2022-03-30 | End: 2022-04-09 | Stop reason: HOSPADM

## 2022-03-30 RX ORDER — BUDESONIDE AND FORMOTEROL FUMARATE DIHYDRATE 160; 4.5 UG/1; UG/1
2 AEROSOL RESPIRATORY (INHALATION) 2 TIMES DAILY
Status: DISCONTINUED | OUTPATIENT
Start: 2022-03-30 | End: 2022-03-30

## 2022-03-30 RX ORDER — PREDNISONE 1 MG/1
5 TABLET ORAL
Status: DISCONTINUED | OUTPATIENT
Start: 2022-03-31 | End: 2022-04-09 | Stop reason: HOSPADM

## 2022-03-30 RX ORDER — FLUTICASONE FUROATE, UMECLIDINIUM BROMIDE AND VILANTEROL TRIFENATATE 200; 62.5; 25 UG/1; UG/1; UG/1
1 POWDER RESPIRATORY (INHALATION) DAILY
COMMUNITY

## 2022-03-30 RX ORDER — SODIUM CHLORIDE 0.9 % (FLUSH) 0.9 %
5-40 SYRINGE (ML) INJECTION EVERY 12 HOURS SCHEDULED
Status: DISCONTINUED | OUTPATIENT
Start: 2022-03-30 | End: 2022-04-09 | Stop reason: HOSPADM

## 2022-03-30 RX ORDER — 0.9 % SODIUM CHLORIDE 0.9 %
250 INTRAVENOUS SOLUTION INTRAVENOUS ONCE
Status: COMPLETED | OUTPATIENT
Start: 2022-03-30 | End: 2022-03-30

## 2022-03-30 RX ORDER — METOPROLOL TARTRATE 5 MG/5ML
5 INJECTION INTRAVENOUS ONCE
Status: COMPLETED | OUTPATIENT
Start: 2022-03-30 | End: 2022-03-30

## 2022-03-30 RX ORDER — ACETAMINOPHEN 650 MG/1
650 SUPPOSITORY RECTAL EVERY 6 HOURS PRN
Status: DISCONTINUED | OUTPATIENT
Start: 2022-03-30 | End: 2022-04-09 | Stop reason: HOSPADM

## 2022-03-30 RX ORDER — METOPROLOL TARTRATE 5 MG/5ML
5 INJECTION INTRAVENOUS EVERY 6 HOURS PRN
Status: DISCONTINUED | OUTPATIENT
Start: 2022-03-30 | End: 2022-03-31

## 2022-03-30 RX ORDER — GUAIFENESIN 600 MG/1
600 TABLET, EXTENDED RELEASE ORAL 2 TIMES DAILY
COMMUNITY

## 2022-03-30 RX ORDER — METHYLPREDNISOLONE SODIUM SUCCINATE 40 MG/ML
40 INJECTION, POWDER, LYOPHILIZED, FOR SOLUTION INTRAMUSCULAR; INTRAVENOUS EVERY 12 HOURS
Status: DISCONTINUED | OUTPATIENT
Start: 2022-03-30 | End: 2022-03-31

## 2022-03-30 RX ORDER — LORAZEPAM 0.5 MG/1
0.5 TABLET ORAL NIGHTLY PRN
Status: DISCONTINUED | OUTPATIENT
Start: 2022-03-30 | End: 2022-03-30

## 2022-03-30 RX ORDER — LANOLIN ALCOHOL/MO/W.PET/CERES
400 CREAM (GRAM) TOPICAL DAILY
Status: DISCONTINUED | OUTPATIENT
Start: 2022-03-30 | End: 2022-03-30

## 2022-03-30 RX ORDER — 0.9 % SODIUM CHLORIDE 0.9 %
500 INTRAVENOUS SOLUTION INTRAVENOUS ONCE
Status: COMPLETED | OUTPATIENT
Start: 2022-03-31 | End: 2022-03-31

## 2022-03-30 RX ORDER — FLUTICASONE PROPIONATE 50 MCG
1 SPRAY, SUSPENSION (ML) NASAL DAILY PRN
Status: DISCONTINUED | OUTPATIENT
Start: 2022-03-30 | End: 2022-04-01

## 2022-03-30 RX ORDER — SODIUM CHLORIDE 0.9 % (FLUSH) 0.9 %
5-40 SYRINGE (ML) INJECTION PRN
Status: DISCONTINUED | OUTPATIENT
Start: 2022-03-30 | End: 2022-04-09 | Stop reason: HOSPADM

## 2022-03-30 RX ORDER — ALBUTEROL SULFATE 2.5 MG/3ML
2.5 SOLUTION RESPIRATORY (INHALATION) EVERY 4 HOURS PRN
COMMUNITY

## 2022-03-30 RX ORDER — METOPROLOL TARTRATE 5 MG/5ML
2.5 INJECTION INTRAVENOUS ONCE
Status: DISCONTINUED | OUTPATIENT
Start: 2022-03-30 | End: 2022-03-30

## 2022-03-30 RX ORDER — BUSPIRONE HYDROCHLORIDE 5 MG/1
5 TABLET ORAL 3 TIMES DAILY
COMMUNITY

## 2022-03-30 RX ORDER — BUSPIRONE HYDROCHLORIDE 10 MG/1
10 TABLET ORAL 3 TIMES DAILY
Status: DISCONTINUED | OUTPATIENT
Start: 2022-03-30 | End: 2022-03-30

## 2022-03-30 RX ORDER — ONDANSETRON 4 MG/1
4 TABLET, ORALLY DISINTEGRATING ORAL EVERY 8 HOURS PRN
Status: DISCONTINUED | OUTPATIENT
Start: 2022-03-30 | End: 2022-04-09 | Stop reason: HOSPADM

## 2022-03-30 RX ORDER — SODIUM CHLORIDE 9 MG/ML
INJECTION, SOLUTION INTRAVENOUS CONTINUOUS
Status: DISCONTINUED | OUTPATIENT
Start: 2022-03-30 | End: 2022-04-04

## 2022-03-30 RX ADMIN — PIPERACILLIN SODIUM AND TAZOBACTAM SODIUM 4500 MG: 4; .5 INJECTION, POWDER, LYOPHILIZED, FOR SOLUTION INTRAVENOUS at 15:15

## 2022-03-30 RX ADMIN — METHYLPREDNISOLONE SODIUM SUCCINATE 40 MG: 40 INJECTION, POWDER, FOR SOLUTION INTRAMUSCULAR; INTRAVENOUS at 23:15

## 2022-03-30 RX ADMIN — Medication 1000 MG: at 19:34

## 2022-03-30 RX ADMIN — SODIUM CHLORIDE, PRESERVATIVE FREE 10 ML: 5 INJECTION INTRAVENOUS at 22:45

## 2022-03-30 RX ADMIN — METOPROLOL TARTRATE 2.5 MG: 5 INJECTION INTRAVENOUS at 19:35

## 2022-03-30 RX ADMIN — HEPARIN SODIUM 5000 UNITS: 5000 INJECTION INTRAVENOUS; SUBCUTANEOUS at 23:09

## 2022-03-30 RX ADMIN — SODIUM CHLORIDE 1000 ML: 9 INJECTION, SOLUTION INTRAVENOUS at 14:00

## 2022-03-30 RX ADMIN — SODIUM CHLORIDE 1000 ML: 9 INJECTION, SOLUTION INTRAVENOUS at 13:45

## 2022-03-30 RX ADMIN — SODIUM CHLORIDE: 9 INJECTION, SOLUTION INTRAVENOUS at 22:37

## 2022-03-30 RX ADMIN — IPRATROPIUM BROMIDE AND ALBUTEROL SULFATE 3 ML: .5; 3 SOLUTION RESPIRATORY (INHALATION) at 23:02

## 2022-03-30 RX ADMIN — ATORVASTATIN CALCIUM 20 MG: 10 TABLET, FILM COATED ORAL at 23:09

## 2022-03-30 RX ADMIN — SODIUM CHLORIDE 250 ML: 9 INJECTION, SOLUTION INTRAVENOUS at 22:37

## 2022-03-30 RX ADMIN — BUSPIRONE HYDROCHLORIDE 10 MG: 10 TABLET ORAL at 17:59

## 2022-03-30 RX ADMIN — VANCOMYCIN HYDROCHLORIDE 1000 MG: 1 INJECTION, POWDER, LYOPHILIZED, FOR SOLUTION INTRAVENOUS at 19:08

## 2022-03-30 RX ADMIN — METOPROLOL TARTRATE 2.5 MG: 1 INJECTION, SOLUTION INTRAVENOUS at 19:35

## 2022-03-30 RX ADMIN — ACETAMINOPHEN 1000 MG: 500 TABLET ORAL at 19:34

## 2022-03-30 RX ADMIN — ACETAMINOPHEN 1000 MG: 500 TABLET ORAL at 18:54

## 2022-03-30 RX ADMIN — CEFEPIME 2000 MG: 2 INJECTION, POWDER, FOR SOLUTION INTRAVENOUS at 23:49

## 2022-03-30 RX ADMIN — METOPROLOL TARTRATE 5 MG: 5 INJECTION INTRAVENOUS at 18:44

## 2022-03-30 RX ADMIN — SODIUM CHLORIDE 500 ML: 9 INJECTION, SOLUTION INTRAVENOUS at 23:43

## 2022-03-30 RX ADMIN — Medication 1000 MG: at 18:54

## 2022-03-30 ASSESSMENT — ENCOUNTER SYMPTOMS
GASTROINTESTINAL NEGATIVE: 1
SHORTNESS OF BREATH: 1
COUGH: 1

## 2022-03-30 ASSESSMENT — PAIN SCALES - GENERAL: PAINLEVEL_OUTOF10: 0

## 2022-03-30 NOTE — ED NOTES
[id number] - Perfect serve sent to Dr. Sawyer Rowe  03/30/22 4061    1622 - Melisa NEUMANN Called back.      Racheal Castillo  03/30/22 1628

## 2022-03-30 NOTE — ED TRIAGE NOTES
Chief Complaint   Patient presents with    Shortness of Breath     EMS states pt c/o sob, hx of COPD, oxygen was increased to 4L, sats have been normal throughout

## 2022-03-30 NOTE — ED NOTES
Pt c/o of feeling anxious, emotional support given to pt. Family at bedside.       Seun Grayson RN  03/30/22 7107

## 2022-03-30 NOTE — ED PROVIDER NOTES
[address] ED  EMERGENCY DEPARTMENT ENCOUNTER        Pt Name: Frederic Perez  MRN: [id number]  [place] 1940  Date of evaluation: 3/30/2022  Provider: Doc Cr PA-C  PCP: Yonatan Cheatham MD  Note Started: 1:42 PM EDT       I have seen and evaluated this patient with my supervising physician Allyson Denise MD.    CHIEF COMPLAINT       Chief Complaint   Patient presents with    Shortness of Breath     EMS states pt c/o sob, hx of COPD, oxygen was increased to 4L, sats have been normal throughout       HISTORY OF PRESENT ILLNESS   (Location, Timing/Onset, Context/Setting, Quality, Duration, Modifying Factors, Severity, Associated Signs and Symptoms)  Note limiting factors. Chief Complaint: Shortness of breath    Frederic Perez is a [de-identified] y.o. female with a past medical h/o  hyperlipidemia asthma tobacco use COPD CAD GERD on 2 L at home at baseline brought in today by EMS with shortness of breath. Patient is a poor historian therefore it is difficult to obtain a full history. She does report increased shortness of breath and productive cough over the past 2 days. Denies chest pain or leg swelling. Denies fevers chills nausea vomiting diarrhea. Onset over the past 2 days. Duration of symptoms have been persistent since onset. Context includes shortness of breath. No aggravating symptoms. No alleviating symptoms. Otherwise denies any other complaints. The rest of the history at this time is difficult to obtain. Nursing Notes were all reviewed and agreed with or any disagreements were addressed in the HPI. REVIEW OF SYSTEMS    (2-9 systems for level 4, 10 or more for level 5)     Review of Systems   Constitutional: Positive for fatigue. HENT: Negative. Respiratory: Positive for cough and shortness of breath. Cardiovascular: Negative. Gastrointestinal: Negative. Genitourinary: Negative. Musculoskeletal: Negative. Skin: Negative. Neurological: Negative. Positives and Pertinent negatives as per HPI. Except as noted above in the ROS, all other systems were reviewed and negative. PAST MEDICAL HISTORY     Past Medical History:   Diagnosis Date    [facility]) [facility] [address]     Asthma     Chronic anxiety     COPD (chronic obstructive pulmonary disease) [facility] [address]     GERD (gastroesophageal reflux disease) 9/9/2021    Hyperlipidemia     Hypertension     MRSA (methicillin resistant staph aureus) culture positive 09/22/2019    + resp cx    OA (osteoarthritis) 8/12/2014         SURGICAL HISTORY     Past Surgical History:   Procedure Laterality Date    BRONCHOSCOPY  09/08/2019    Dr. Jose Wade - w/BAL   [address]  09/05/2019    Dr. Asya Burns N/A 2/24/2020    COLONOSCOPY DIAGNOSTIC performed by Walt Mayfield DO at [address] N/A 10/22/2021    COLONOSCOPY POLYPECTOMY SNARE/COLD BIOPSY performed by Aquiles Wilkins MD at [address] [place] [address] GRAFT N/A 9/19/2019    OFF PUMP CORONARY ARTERY BYPASS GRAFTING X2, INTERNAL MAMMARY ARTERY, SAPHENOUS VEIN GRAFT performed by Caroleen Halsted, MD at [facility] CATH  9/20/2021     MIDLINE CATH 9/20/2021 [facility] PROCED[facility]    MASTECTOMY, PARTIAL Left     SIGMOIDOSCOPY  02/27/2020    4 bands    SIGMOIDOSCOPY N/A 2/27/2020    FLEX SIG W/ BANDING SIGMOIDOSCOPY DIAGNOSTIC FLEXIBLE performed by Walt Mayfield DO at [address]       Previous Medications    ACETAMINOPHEN (TYLENOL) 500 MG TABLET    Take 500 mg by mouth every 6 hours as needed for Pain    ALBUTEROL (PROVENTIL HFA) 108 (90 BASE) MCG/ACT INHALER    Inhale 2 puffs into the lungs every 6 hours as needed for Wheezing or Shortness of Breath.     ALBUTEROL SULFATE (PROAIR RESPICLICK) 819 (90 BASE) MCG/ACT AEROSOL POWDER INHALATION    2 puff: EVERY 6 HOURS ATORVASTATIN (LIPITOR) 20 MG TABLET    Take 1 tablet by mouth nightly    BUDESONIDE (PULMICORT) 0.5 MG/2ML NEBULIZER SUSPENSION    Take 2 mLs by nebulization 2 times daily    BUDESONIDE-FORMOTEROL (SYMBICORT) 160-4.5 MCG/ACT AERO    Inhale 2 puffs into the lungs 2 times daily    BUSPIRONE (BUSPAR) 10 MG TABLET    Take 10 mg by mouth 3 times daily Indications: Feeling Anxious    CLOPIDOGREL (PLAVIX) 75 MG TABLET    Take 1 tablet by mouth daily    DOCUSATE SODIUM (COLACE, DULCOLAX) 100 MG CAPS    Take 100 mg by mouth 2 times daily    FERROUS SULFATE (IRON 325) 325 (65 FE) MG TABLET    Take 325 mg by mouth daily (with breakfast)    FLUTICASONE (FLONASE) 50 MCG/ACT NASAL SPRAY    1 spray by Each Nostril route daily as needed for Rhinitis    FUROSEMIDE (LASIX) 20 MG TABLET    Take 1 tablet by mouth See Admin Instructions Tuesday, friday    IPRATROPIUM-ALBUTEROL (DUONEB) 0.5-2.5 (3) MG/3ML SOLN NEBULIZER SOLUTION    Inhale 3 mLs into the lungs every 6 hours    LORAZEPAM (ATIVAN) 0.5 MG TABLET    Take 0.5 mg by mouth nightly as needed. MAGNESIUM OXIDE (MAG-OX) 400 (241.3 MG) MG TABS TABLET    Take 1 tablet by mouth daily    MELATONIN 5 MG TBDP DISINTEGRATING TABLET    Take 1 tablet by mouth nightly as needed (sleep)    MENTHOL-ZINC OXIDE (CALMOSEPTINE) 0.44-20.6 % OINT OINTMENT    Apply topically 2 times daily as needed (Apply to buttocks until resolved) Max 30 ml per day.     METOPROLOL TARTRATE (LOPRESSOR) 25 MG TABLET    Take 1 tablet by mouth 2 times daily    MICONAZOLE NITRATE 2 % OINT    Apply topically 2 times daily    ONDANSETRON (ZOFRAN-ODT) 4 MG DISINTEGRATING TABLET    Take 1 tablet by mouth every 8 hours as needed for Nausea or Vomiting    OXYGEN    Inhale 2 L into the lungs    PANTOPRAZOLE (PROTONIX) 20 MG TABLET    Take 20 mg by mouth daily    POTASSIUM & SODIUM PHOSPHATES (PHOS-NAK) 280-160-250 MG PACK    Take 1 packet by mouth daily    PREDNISONE (DELTASONE) 10 MG TABLET    3 tabs a day for 5 days,  Then 2 tabs a day for 5 days ,  Then 1 tab daily    ROFLUMILAST (DALIRESP) 250 MCG TABLET    Take 250 mcg by mouth daily    SERTRALINE (ZOLOFT) 50 MG TABLET    Take 50 mg by mouth daily    TIOTROPIUM (SPIRIVA HANDIHALER) 18 MCG INHALATION CAPSULE    Inhale 18 mcg into the lungs daily         ALLERGIES     Latex and Codeine    FAMILYHISTORY       Family History   Problem Relation Age of Onset    Cancer Mother     Heart Disease Father     Stroke Father     Heart Disease Sister     Stroke Sister           SOCIAL HISTORY       Social History     Tobacco Use    Smoking status: Former Smoker     Packs/day: 0.50     Years: 50.00     Pack years: 25.00     Types: Cigarettes     Quit date: 2019     Years since quittin.5    Smokeless tobacco: Never Used    Tobacco comment: quit 2018   Vaping Use    Vaping Use: Not on file   Substance Use Topics    Alcohol use: No    Drug use: No       SCREENINGS             PHYSICAL EXAM    (up to 7 for level 4, 8 or more for level 5)     ED Triage Vitals [22 1320]   BP Temp Temp Source Pulse Resp SpO2 Height Weight   (!) 80/56 98.2 °F (36.8 °C) Oral 102 20 98 % 5' 4\" (1.626 m) 110 lb (49.9 kg)       Physical Exam  Vitals and nursing note reviewed. Constitutional:       Appearance: Normal appearance. She is well-developed. She is ill-appearing and toxic-appearing. She is not diaphoretic. Interventions: Nasal cannula in place. Comments: 4 L NC (baseline of 2 L)   HENT:      Head: Normocephalic and atraumatic. Nose: Nose normal.   Eyes:      General:         Right eye: No discharge. Left eye: No discharge. Cardiovascular:      Rate and Rhythm: Normal rate and regular rhythm. Pulses:           Radial pulses are 2+ on the right side and 2+ on the left side. Heart sounds: Normal heart sounds. No murmur heard. No gallop. Pulmonary:      Effort: Pulmonary effort is normal. No respiratory distress.       Breath sounds: Decreased breath sounds present. No wheezing, rhonchi or rales. Chest:      Chest wall: No tenderness. Musculoskeletal:         General: No deformity. Normal range of motion. Cervical back: Normal range of motion and neck supple. Right lower leg: No edema. Left lower leg: No edema. Skin:     General: Skin is warm and dry. Neurological:      General: No focal deficit present. Mental Status: She is alert. Mental status is at baseline. GCS: GCS eye subscore is 4. GCS verbal subscore is 5. GCS motor subscore is 6. Cranial Nerves: Cranial nerves are intact. Psychiatric:         Behavior: Behavior normal. Behavior is cooperative.          DIAGNOSTIC RESULTS   LABS:    Labs Reviewed   CBC WITH AUTO DIFFERENTIAL - Abnormal; Notable for the following components:       Result Value    WBC 28.2 (*)     RBC 3.94 (*)     Hemoglobin 11.6 (*)     Neutrophils Absolute 26.1 (*)     Lymphocytes Absolute 0.6 (*)     Monocytes Absolute 1.5 (*)     All other components within normal limits   COMPREHENSIVE METABOLIC PANEL W/ REFLEX TO MG FOR LOW K - Abnormal; Notable for the following components:    Chloride 95 (*)     Anion Gap 17 (*)     Glucose 135 (*)     BUN 35 (*)     CREATININE 2.1 (*)     GFR Non- 23 (*)     GFR  27 (*)     Total Protein 6.2 (*)     Albumin 3.1 (*)     Albumin/Globulin Ratio 1.0 (*)     ALT 7 (*)     All other components within normal limits   TROPONIN - Abnormal; Notable for the following components:    Troponin 0.16 (*)     All other components within normal limits   BLOOD GAS, VENOUS - Abnormal; Notable for the following components:    pH, Zach 7.456 (*)     pO2, Zach 122.8 (*)     Base Excess, Zach 4.4 (*)     All other components within normal limits   BRAIN NATRIURETIC PEPTIDE - Abnormal; Notable for the following components:    Pro-BNP 17,519 (*)     All other components within normal limits   URINALYSIS WITH REFLEX TO CULTURE - Abnormal; Notable for the following components:    Clarity, UA SL CLOUDY (*)     Blood, Urine MODERATE (*)     Protein,  (*)     Nitrite, Urine POSITIVE (*)     Leukocyte Esterase, Urine LARGE (*)     All other components within normal limits   PROCALCITONIN - Abnormal; Notable for the following components:    Procalcitonin >100.00 (*)     All other components within normal limits   TROPONIN - Abnormal; Notable for the following components:    Troponin 0.12 (*)     All other components within normal limits   MICROSCOPIC URINALYSIS - Abnormal; Notable for the following components:    WBC, UA  (*)     Renal Epithelial, UA 2-5 (*)     Bacteria, UA 3+ (*)     All other components within normal limits   COVID-19 & INFLUENZA COMBO   CULTURE, BLOOD 1   CULTURE, BLOOD 2   CULTURE, URINE   LACTATE, SEPSIS   LACTATE, SEPSIS       When ordered only abnormal lab results are displayed. All other labs were within normal range or not returned as of this dictation. EKG: When ordered, EKG's are interpreted by the Emergency Department Physician in the absence of a cardiologist.  Please see their note for interpretation of EKG. RADIOLOGY:   Non-plain film images such as CT, Ultrasound and MRI are read by the radiologist. Plain radiographic images are visualized and preliminarily interpreted by the ED Provider with the below findings:        Interpretation per the Radiologist below, if available at the time of this note:    XR CHEST PORTABLE   Final Result   Bilateral ill-defined linear pulmonary opacities could represent pulmonary   edema or infection           No results found. PROCEDURES   Unless otherwise noted below, none     Procedures    CRITICAL CARE TIME   Total Critical Care time was 35 minutes, excluding separately reportable procedures. There was a high probability of clinically significant/life threatening deterioration in the patient's condition which required my urgent intervention.         CONSULTS:  PHARMACY TO DOSE VANCOMYCIN  IP CONSULT TO HOSPITALIST      EMERGENCY DEPARTMENT COURSE and DIFFERENTIAL DIAGNOSIS/MDM:   Vitals:    Vitals:    03/30/22 1320 03/30/22 1615 03/30/22 1618 03/30/22 1631   BP: (!) 80/56 (!) 105/49 (!) 104/57 100/81   Pulse: 102      Resp: 20      Temp: 98.2 °F (36.8 °C)      TempSrc: Oral      SpO2: 98%      Weight: 110 lb (49.9 kg)      Height: 5' 4\" (1.626 m)          Patient was given the following medications:  Medications   vancomycin 1000 mg IVPB in 250 mL D5W addavial (has no administration in time range)   0.9 % sodium chloride bolus (0 mLs IntraVENous Stopped 3/30/22 1450)   0.9 % sodium chloride bolus (1,000 mLs IntraVENous New Bag 3/30/22 1400)   piperacillin-tazobactam (ZOSYN) 4,500 mg in sodium chloride 0.9 % 100 mL IVPB (mini-bag) (4,500 mg IntraVENous New Bag 3/30/22 1515)     ED Course as of 03/30/22 [address] [facility]   Wed Mar 30, 2022   1417 Hypotensive  4L (usually on 2L)  Hx of COPD [ER]      ED Course User Index  [ER] Judith Huitron MD        Patient brought in today by EMS from home with complaints of shortness of breath. On exam she is hypotensive tachycardic breathing on 4 L nasal cannula typically uses 2 L. Appears ill. Old labs records reviewed. Patient seen by myself as well as my attending Dr. Estelita Pitt. Upon arrival to the ED patient was hypotensive she was given 2 L of fluids. Will reevaluate after fluid administration. Chest x-ray shows bilateral ill-defined linear pulmonary opacities could represent pulmonary edema or infection. CBC shows a white count of 28.2 hemoglobin of 11.6 troponin of 0.16 VBG unremarkable EKG reviewed by my attending see note. BNP of 17,516 lactic acid of 1.3 pro calcitonin over 100. Initial troponin of 0.16. Repeat troponin of 0.12. Patient has a creatinine of 2.1 GFR of 23. No acute electrolyte abnormalities. She does appear to have an acute NADJA today. COVID and flu are negative. Blood cultures were sent.   Urine positive nitrites large leukocytes with  WBCs +3 bacteria. Patient is flagging for sepsis. Sepsis initiated at 1440. She was given IV vancomycin and Zosyn in addition to 2 full liters of fluids accounting for her full septic fluids. Upon reevaluation her blood pressure has responded with fluids. Spoke to the hospitalist who did accept this admission. Patient stable at time of admission. FINAL IMPRESSION      1. Septicemia ([facility] [address]    2. NADJA (acute kidney injury) ([facility] [address]    3. Elevated troponin          DISPOSITION/PLAN   DISPOSITION Decision To Admit 03/30/2022 05:16:11 PM      PATIENT REFERRED TO:  No follow-up provider specified.     DISCHARGE MEDICATIONS:  New Prescriptions    No medications on file       DISCONTINUED MEDICATIONS:  Discontinued Medications    No medications on file              (Please note that portions of this note were completed with a voice recognition program.  Efforts were made to edit the dictations but occasionally words are mis-transcribed.)    Kylah Vazquez PA-C (electronically signed)            Kylah Vazquez PA-C  03/30/22 [id number]

## 2022-03-30 NOTE — ED NOTES
Pt is anxious, mumbling, wanting medications for anxiousness.  Ashley Hightower, SUJIT  03/30/22 6505

## 2022-03-30 NOTE — CONSULTS
Anxiety    Severe anxiety    Severe protein-calorie malnutrition (HCC)    HCAP (healthcare-associated pneumonia)    Pleural effusion    Hyperglycemia    Sepsis (HCC)     Allergies:  Latex and Codeine     Recent Labs     03/30/22  1346   BUN 35*   CREATININE 2.1*   WBC 28.2*     No intake or output data in the 24 hours ending 03/30/22 1954  Culture Date      Source                       Results      Ht Readings from Last 1 Encounters:   03/30/22 5' 4\" (1.626 m)        Wt Readings from Last 1 Encounters:   03/30/22 110 lb (49.9 kg)       Body mass index is 18.88 kg/m². Estimated Creatinine Clearance: 17 mL/min (A) (based on SCr of 2.1 mg/dL (H)). Goal Trough Level: 15-20 mcg/mL    Assessment/Plan:  Patient received Vancomycin loading dose of 1000 mg x1 in the ED  Pharmacy will pulse dose due to a CrCl of 17  Random level ordered for 3/31/22 @ 0600    Timing of trough level will be determined based on culture results, renal function, and clinical response. Thank you for the consult. Will continue to follow.

## 2022-03-30 NOTE — PROGRESS NOTES
Admit to ICU  Pneumonia, UTI, sepsis, elevated troponin    's per RN in the ED. Giving Lopressor now. Added cardiology consult. Repeat EKG obtained.   Trend troponin     Williams WHYTE  3/30/2022

## 2022-03-30 NOTE — ED NOTES
[facility] called was transferred to consult line for [facility].  Unknown who is on call (going to put Dr. Felicia Navarro down that's who was here on call today)     Racheal Castillo  03/30/22 1838    Dr. Felicia Navarro added to treatment team consult completed     Racheal Castillo  03/30/22 6653

## 2022-03-30 NOTE — CONSULTS
Pharmacy to dose Vancomycin for ED patient. Dx:  Pneumonia (HAP)  Weight = 49.9 kg;  CrCl = 17 ml/min. Vancomycin 20 mg/kg = 998 mg;  rounded to Vancomycin 1000 mg IVPB x 1. If patient admitted and Vancomycin is continued, pulse dosing is recommended.   Yee Loredo R.Ph.3/30/87652:56 PM

## 2022-03-30 NOTE — ED NOTES
[address] - Called Pulmonary a/s for ICU admit and Sepsis. Routine consult. Dr. Petey Hannon on call   Added to treatment team and completed Consult.      Romulo Gandhi  03/30/22 [id number]

## 2022-03-31 ENCOUNTER — APPOINTMENT (OUTPATIENT)
Dept: GENERAL RADIOLOGY | Age: 82
DRG: 871 | End: 2022-03-31
Payer: MEDICARE

## 2022-03-31 LAB
A/G RATIO: 1.1 (ref 1.1–2.2)
ALBUMIN SERPL-MCNC: 2.6 G/DL (ref 3.4–5)
ALP BLD-CCNC: 81 U/L (ref 40–129)
ALT SERPL-CCNC: 6 U/L (ref 10–40)
ANION GAP SERPL CALCULATED.3IONS-SCNC: 13 MMOL/L (ref 3–16)
AST SERPL-CCNC: 16 U/L (ref 15–37)
BASE EXCESS VENOUS: -2.4 MMOL/L (ref -3–3)
BASOPHILS ABSOLUTE: 0 K/UL (ref 0–0.2)
BASOPHILS RELATIVE PERCENT: 0 %
BILIRUB SERPL-MCNC: 0.3 MG/DL (ref 0–1)
BUN BLDV-MCNC: 35 MG/DL (ref 7–20)
C DIFF TOXIN/ANTIGEN: NORMAL
CALCIUM SERPL-MCNC: 8.2 MG/DL (ref 8.3–10.6)
CARBOXYHEMOGLOBIN: 0.3 % (ref 0–1.5)
CHLORIDE BLD-SCNC: 106 MMOL/L (ref 99–110)
CO2: 22 MMOL/L (ref 21–32)
CREAT SERPL-MCNC: 2.2 MG/DL (ref 0.6–1.2)
EKG ATRIAL RATE: 102 BPM
EKG ATRIAL RATE: 174 BPM
EKG DIAGNOSIS: NORMAL
EKG DIAGNOSIS: NORMAL
EKG P AXIS: 74 DEGREES
EKG P-R INTERVAL: 128 MS
EKG P-R INTERVAL: 154 MS
EKG Q-T INTERVAL: 214 MS
EKG Q-T INTERVAL: 396 MS
EKG QRS DURATION: 108 MS
EKG QRS DURATION: 92 MS
EKG QTC CALCULATION (BAZETT): 360 MS
EKG QTC CALCULATION (BAZETT): 516 MS
EKG R AXIS: -66 DEGREES
EKG R AXIS: -74 DEGREES
EKG T AXIS: 47 DEGREES
EKG T AXIS: 93 DEGREES
EKG VENTRICULAR RATE: 102 BPM
EKG VENTRICULAR RATE: 171 BPM
EOSINOPHILS ABSOLUTE: 0 K/UL (ref 0–0.6)
EOSINOPHILS RELATIVE PERCENT: 0 %
GFR AFRICAN AMERICAN: 26
GFR NON-AFRICAN AMERICAN: 21
GLUCOSE BLD-MCNC: 169 MG/DL (ref 70–99)
GLUCOSE BLD-MCNC: 181 MG/DL (ref 70–99)
GLUCOSE BLD-MCNC: 205 MG/DL (ref 70–99)
GLUCOSE BLD-MCNC: 241 MG/DL (ref 70–99)
HCO3 VENOUS: 24 MMOL/L (ref 23–29)
HCT VFR BLD CALC: 31 % (ref 36–48)
HEMOGLOBIN: 9.8 G/DL (ref 12–16)
LYMPHOCYTES ABSOLUTE: 0.5 K/UL (ref 1–5.1)
LYMPHOCYTES RELATIVE PERCENT: 1.2 %
MAGNESIUM: 2.8 MG/DL (ref 1.8–2.4)
MAGNESIUM: 2.8 MG/DL (ref 1.8–2.4)
MCH RBC QN AUTO: 29.3 PG (ref 26–34)
MCHC RBC AUTO-ENTMCNC: 31.5 G/DL (ref 31–36)
MCV RBC AUTO: 92.8 FL (ref 80–100)
METHEMOGLOBIN VENOUS: 0.3 %
MONOCYTES ABSOLUTE: 0.5 K/UL (ref 0–1.3)
MONOCYTES RELATIVE PERCENT: 1.4 %
NEUTROPHILS ABSOLUTE: 37.4 K/UL (ref 1.7–7.7)
NEUTROPHILS RELATIVE PERCENT: 97.4 %
O2 SAT, VEN: 79 %
O2 THERAPY: ABNORMAL
ORGANISM: ABNORMAL
PCO2, VEN: 48.8 MMHG (ref 40–50)
PDW BLD-RTO: 13.3 % (ref 12.4–15.4)
PERFORMED ON: ABNORMAL
PH VENOUS: 7.31 (ref 7.35–7.45)
PLATELET # BLD: 287 K/UL (ref 135–450)
PMV BLD AUTO: 7.5 FL (ref 5–10.5)
PO2, VEN: 47.6 MMHG (ref 25–40)
POTASSIUM REFLEX MAGNESIUM: 3.1 MMOL/L (ref 3.5–5.1)
RBC # BLD: 3.34 M/UL (ref 4–5.2)
REPORT: NORMAL
SODIUM BLD-SCNC: 141 MMOL/L (ref 136–145)
TCO2 CALC VENOUS: 26 MMOL/L
TOTAL PROTEIN: 4.9 G/DL (ref 6.4–8.2)
TROPONIN: 0.14 NG/ML
TROPONIN: 0.23 NG/ML
URINE CULTURE, ROUTINE: ABNORMAL
VANCOMYCIN RANDOM: 13.8 UG/ML
WBC # BLD: 38.4 K/UL (ref 4–11)

## 2022-03-31 PROCEDURE — 80202 ASSAY OF VANCOMYCIN: CPT

## 2022-03-31 PROCEDURE — 6360000002 HC RX W HCPCS: Performed by: INTERNAL MEDICINE

## 2022-03-31 PROCEDURE — 6370000000 HC RX 637 (ALT 250 FOR IP): Performed by: NURSE PRACTITIONER

## 2022-03-31 PROCEDURE — 2000000000 HC ICU R&B

## 2022-03-31 PROCEDURE — 94761 N-INVAS EAR/PLS OXIMETRY MLT: CPT

## 2022-03-31 PROCEDURE — 2580000003 HC RX 258: Performed by: NURSE PRACTITIONER

## 2022-03-31 PROCEDURE — 6360000002 HC RX W HCPCS: Performed by: NURSE PRACTITIONER

## 2022-03-31 PROCEDURE — 2700000000 HC OXYGEN THERAPY PER DAY

## 2022-03-31 PROCEDURE — 71045 X-RAY EXAM CHEST 1 VIEW: CPT

## 2022-03-31 PROCEDURE — 99223 1ST HOSP IP/OBS HIGH 75: CPT | Performed by: INTERNAL MEDICINE

## 2022-03-31 PROCEDURE — 87449 NOS EACH ORGANISM AG IA: CPT

## 2022-03-31 PROCEDURE — 84484 ASSAY OF TROPONIN QUANT: CPT

## 2022-03-31 PROCEDURE — 93010 ELECTROCARDIOGRAM REPORT: CPT | Performed by: INTERNAL MEDICINE

## 2022-03-31 PROCEDURE — 83735 ASSAY OF MAGNESIUM: CPT

## 2022-03-31 PROCEDURE — 6370000000 HC RX 637 (ALT 250 FOR IP): Performed by: INTERNAL MEDICINE

## 2022-03-31 PROCEDURE — 02HV33Z INSERTION OF INFUSION DEVICE INTO SUPERIOR VENA CAVA, PERCUTANEOUS APPROACH: ICD-10-PCS | Performed by: INTERNAL MEDICINE

## 2022-03-31 PROCEDURE — 36415 COLL VENOUS BLD VENIPUNCTURE: CPT

## 2022-03-31 PROCEDURE — 36556 INSERT NON-TUNNEL CV CATH: CPT

## 2022-03-31 PROCEDURE — 94640 AIRWAY INHALATION TREATMENT: CPT

## 2022-03-31 PROCEDURE — 2500000003 HC RX 250 WO HCPCS: Performed by: INTERNAL MEDICINE

## 2022-03-31 PROCEDURE — 2580000003 HC RX 258: Performed by: INTERNAL MEDICINE

## 2022-03-31 PROCEDURE — 85025 COMPLETE CBC W/AUTO DIFF WBC: CPT

## 2022-03-31 PROCEDURE — 94660 CPAP INITIATION&MGMT: CPT

## 2022-03-31 PROCEDURE — 87324 CLOSTRIDIUM AG IA: CPT

## 2022-03-31 PROCEDURE — 80053 COMPREHEN METABOLIC PANEL: CPT

## 2022-03-31 RX ORDER — SODIUM CHLORIDE 9 MG/ML
INJECTION, SOLUTION INTRAVENOUS PRN
Status: DISCONTINUED | OUTPATIENT
Start: 2022-03-31 | End: 2022-04-09 | Stop reason: HOSPADM

## 2022-03-31 RX ORDER — IPRATROPIUM BROMIDE AND ALBUTEROL SULFATE 2.5; .5 MG/3ML; MG/3ML
1 SOLUTION RESPIRATORY (INHALATION) EVERY 4 HOURS PRN
Status: DISCONTINUED | OUTPATIENT
Start: 2022-03-31 | End: 2022-04-02

## 2022-03-31 RX ORDER — ATORVASTATIN CALCIUM 40 MG/1
40 TABLET, FILM COATED ORAL NIGHTLY
Status: DISCONTINUED | OUTPATIENT
Start: 2022-03-31 | End: 2022-04-09 | Stop reason: HOSPADM

## 2022-03-31 RX ORDER — DEXTROSE MONOHYDRATE 50 MG/ML
100 INJECTION, SOLUTION INTRAVENOUS PRN
Status: DISCONTINUED | OUTPATIENT
Start: 2022-03-31 | End: 2022-04-09 | Stop reason: HOSPADM

## 2022-03-31 RX ORDER — IPRATROPIUM BROMIDE AND ALBUTEROL SULFATE 2.5; .5 MG/3ML; MG/3ML
3 SOLUTION RESPIRATORY (INHALATION) 4 TIMES DAILY
Status: DISCONTINUED | OUTPATIENT
Start: 2022-04-01 | End: 2022-04-02

## 2022-03-31 RX ORDER — POTASSIUM CHLORIDE 29.8 MG/ML
20 INJECTION INTRAVENOUS
Status: COMPLETED | OUTPATIENT
Start: 2022-03-31 | End: 2022-03-31

## 2022-03-31 RX ORDER — NICOTINE POLACRILEX 4 MG
15 LOZENGE BUCCAL PRN
Status: DISCONTINUED | OUTPATIENT
Start: 2022-03-31 | End: 2022-04-08 | Stop reason: ALTCHOICE

## 2022-03-31 RX ORDER — 0.9 % SODIUM CHLORIDE 0.9 %
500 INTRAVENOUS SOLUTION INTRAVENOUS ONCE
Status: COMPLETED | OUTPATIENT
Start: 2022-03-31 | End: 2022-03-31

## 2022-03-31 RX ADMIN — FLUTICASONE PROPIONATE 1 SPRAY: 50 SPRAY, METERED NASAL at 21:35

## 2022-03-31 RX ADMIN — IPRATROPIUM BROMIDE AND ALBUTEROL SULFATE 3 ML: .5; 3 SOLUTION RESPIRATORY (INHALATION) at 19:31

## 2022-03-31 RX ADMIN — SERTRALINE HYDROCHLORIDE 50 MG: 50 TABLET ORAL at 08:24

## 2022-03-31 RX ADMIN — BUSPIRONE HYDROCHLORIDE 5 MG: 5 TABLET ORAL at 19:56

## 2022-03-31 RX ADMIN — ACETAMINOPHEN 650 MG: 325 TABLET ORAL at 14:24

## 2022-03-31 RX ADMIN — SODIUM CHLORIDE, PRESERVATIVE FREE 10 ML: 5 INJECTION INTRAVENOUS at 20:03

## 2022-03-31 RX ADMIN — Medication 250 MG: at 11:49

## 2022-03-31 RX ADMIN — SODIUM CHLORIDE, PRESERVATIVE FREE 10 ML: 5 INJECTION INTRAVENOUS at 08:25

## 2022-03-31 RX ADMIN — POTASSIUM CHLORIDE 20 MEQ: 29.8 INJECTION, SOLUTION INTRAVENOUS at 10:28

## 2022-03-31 RX ADMIN — SODIUM CHLORIDE: 9 INJECTION, SOLUTION INTRAVENOUS at 05:31

## 2022-03-31 RX ADMIN — IPRATROPIUM BROMIDE AND ALBUTEROL SULFATE 3 ML: .5; 3 SOLUTION RESPIRATORY (INHALATION) at 16:13

## 2022-03-31 RX ADMIN — Medication 250 MG: at 23:12

## 2022-03-31 RX ADMIN — INSULIN LISPRO 1 UNITS: 100 INJECTION, SOLUTION INTRAVENOUS; SUBCUTANEOUS at 18:09

## 2022-03-31 RX ADMIN — BUSPIRONE HYDROCHLORIDE 5 MG: 5 TABLET ORAL at 14:24

## 2022-03-31 RX ADMIN — NOREPINEPHRINE BITARTRATE 2 MCG/MIN: 1 INJECTION INTRAVENOUS at 02:28

## 2022-03-31 RX ADMIN — ATORVASTATIN CALCIUM 40 MG: 40 TABLET, FILM COATED ORAL at 19:56

## 2022-03-31 RX ADMIN — BUSPIRONE HYDROCHLORIDE 5 MG: 5 TABLET ORAL at 08:25

## 2022-03-31 RX ADMIN — VANCOMYCIN HYDROCHLORIDE 750 MG: 750 INJECTION, POWDER, LYOPHILIZED, FOR SOLUTION INTRAVENOUS at 08:26

## 2022-03-31 RX ADMIN — POTASSIUM CHLORIDE 20 MEQ: 29.8 INJECTION, SOLUTION INTRAVENOUS at 09:26

## 2022-03-31 RX ADMIN — Medication 250 MG: at 18:09

## 2022-03-31 RX ADMIN — PREDNISONE 5 MG: 5 TABLET ORAL at 08:24

## 2022-03-31 RX ADMIN — HEPARIN SODIUM 5000 UNITS: 5000 INJECTION INTRAVENOUS; SUBCUTANEOUS at 19:56

## 2022-03-31 RX ADMIN — CLOPIDOGREL BISULFATE 75 MG: 75 TABLET ORAL at 08:24

## 2022-03-31 RX ADMIN — INSULIN LISPRO 2 UNITS: 100 INJECTION, SOLUTION INTRAVENOUS; SUBCUTANEOUS at 11:51

## 2022-03-31 RX ADMIN — SODIUM CHLORIDE: 9 INJECTION, SOLUTION INTRAVENOUS at 17:13

## 2022-03-31 RX ADMIN — PANTOPRAZOLE SODIUM 40 MG: 40 TABLET, DELAYED RELEASE ORAL at 05:43

## 2022-03-31 RX ADMIN — FERROUS SULFATE TAB 325 MG (65 MG ELEMENTAL FE) 325 MG: 325 (65 FE) TAB at 08:24

## 2022-03-31 RX ADMIN — IPRATROPIUM BROMIDE AND ALBUTEROL SULFATE 3 ML: .5; 3 SOLUTION RESPIRATORY (INHALATION) at 11:47

## 2022-03-31 RX ADMIN — SODIUM CHLORIDE 500 ML: 9 INJECTION, SOLUTION INTRAVENOUS at 01:00

## 2022-03-31 RX ADMIN — HEPARIN SODIUM 5000 UNITS: 5000 INJECTION INTRAVENOUS; SUBCUTANEOUS at 08:24

## 2022-03-31 RX ADMIN — IPRATROPIUM BROMIDE AND ALBUTEROL SULFATE 3 ML: .5; 3 SOLUTION RESPIRATORY (INHALATION) at 08:12

## 2022-03-31 RX ADMIN — MEROPENEM 500 MG: 500 INJECTION, POWDER, FOR SOLUTION INTRAVENOUS at 23:14

## 2022-03-31 RX ADMIN — FLUTICASONE PROPIONATE 1 SPRAY: 50 SPRAY, METERED NASAL at 01:52

## 2022-03-31 RX ADMIN — MAGNESIUM SULFATE HEPTAHYDRATE 2000 MG: 40 INJECTION, SOLUTION INTRAVENOUS at 02:38

## 2022-03-31 ASSESSMENT — PAIN SCALES - GENERAL: PAINLEVEL_OUTOF10: 8

## 2022-03-31 NOTE — PROGRESS NOTES
Micro called with positive blood cultures with E-coli. Patient is on cefepime. No change in therapy is warranted.

## 2022-03-31 NOTE — PROGRESS NOTES
Vancomycin Day # 2  Current dose = Pulse dosing per daily vancomycin levels. BUN/SRCR 35/2.2      Est CrCl = 16 ml/min  WBC   38.4            Tmax 101.4  Vancomycin random level this am = 13.8 mcg/ml  Will give vancomycin 750 mg x1 dose today and recheck random level tomorrow am.  Pharmacy will continue to obtain daily random vancomycin levels and redose as appropriate.

## 2022-03-31 NOTE — PROGRESS NOTES
RT Nebulizer Bronchodilator Protocol Note    There is a bronchodilator order in the chart from a provider indicating to follow the RT Bronchodilator Protocol and there is an Initiate RT Bronchodilator Protocol order as well (see protocol at bottom of note). CXR Findings:  XR CHEST PORTABLE    Result Date: 3/31/2022  Interval placement of a left IJ approach central venous catheter with the tip overlying the mid SVC, with no pneumothorax identified. XR CHEST PORTABLE    Result Date: 3/30/2022  Bilateral ill-defined linear pulmonary opacities could represent pulmonary edema or infection       The findings from the last RT Protocol Assessment were as follows:  Smoking: Chronic pulmonary disease  Respiratory Pattern: Mild dyspnea at rest, irregular pattern, or RR 21-25 bpm  Breath Sounds: Slightly diminished and/or crackles  Cough: Strong, spontaneous, non-productive  Indication for Bronchodilator Therapy: On home bronchodilators  Bronchodilator Assessment Score: 8    Aerosolized bronchodilator medication orders have been revised according to the RT Nebulizer Bronchodilator Protocol below. Respiratory Therapist to perform RT Therapy Protocol Assessment initially then follow the protocol. Repeat RT Therapy Protocol Assessment PRN for score 0-3 or on second treatment, BID, and PRN for scores above 3. No Indications - adjust the frequency to every 6 hours PRN wheezing or bronchospasm, if no treatments needed after 48 hours then discontinue using Per Protocol order mode. If indication present, adjust the RT bronchodilator orders based on the Bronchodilator Assessment Score as indicated below. If a patient is on this medication at home then do not decrease Frequency below that used at home. 0-3 - enter or revise RT bronchodilator order(s) to equivalent RT Bronchodilator order with Frequency of every 4 hours PRN for wheezing or increased work of breathing using Per Protocol order mode.        4-6 - enter or revise RT Bronchodilator order(s) to two equivalent RT bronchodilator orders with one order with BID Frequency and one order with Frequency of every 4 hours PRN wheezing or increased work of breathing using Per Protocol order mode. 7-10 - enter or revise RT Bronchodilator order(s) to two equivalent RT bronchodilator orders with one order with TID Frequency and one order with Frequency of every 4 hours PRN wheezing or increased work of breathing using Per Protocol order mode. 11-13 - enter or revise RT Bronchodilator order(s) to one equivalent RT bronchodilator order with QID Frequency and an Albuterol order with Frequency of every 4 hours PRN wheezing or increased work of breathing using Per Protocol order mode. Greater than 13 - enter or revise RT Bronchodilator order(s) to one equivalent RT bronchodilator order with every 4 hours Frequency and an Albuterol order with Frequency of every 2 hours PRN wheezing or increased work of breathing using Per Protocol order mode. RT to enter RT Home Evaluation for COPD & MDI Assessment order using Per Protocol order mode.     Electronically signed by Jeimy Cali RCP on 3/31/2022 at 4:44 PM

## 2022-03-31 NOTE — PROGRESS NOTES
Blood culture results called to unit positive for E-Coli in all 4 bottles sent. Will notify on coming RN to report to am doctor.

## 2022-03-31 NOTE — PROGRESS NOTES
Hospitalist Progress Note      PCP: Yonatan Cheatham MD    Date of Admission: 3/30/2022    Chief Complaint: SOB from home via EMS. Hx of end stage COPD. Home o2 dependent - reports 2.5L baseline - increases to 3-4L when SOB     Recent treated for Cdiff diarrhea. Reports she finished Abx about a week ago - per home care notes Vanc was extended from 3/24-3/29 after the initial treatment and she finished it on 3/29. Pt reports foul smelling urine but denies dysuria. Subjective:     She is on 4L/min O2. Had 2 episodes of watery diarrhea this am already     Her blood cultures growing ecoli x4 overnight. Medications:  Reviewed    Infusion Medications    norepinephrine 3 mcg/min (03/31/22 0854)    sodium chloride      dextrose      sodium chloride      sodium chloride Stopped (03/31/22 0826)     Scheduled Medications    cefTRIAXone (ROCEPHIN) IV  1,000 mg IntraVENous Q24H    vancomycin  250 mg Oral 4 times per day    insulin lispro  0-6 Units SubCUTAneous TID WC    insulin lispro  0-3 Units SubCUTAneous Nightly    atorvastatin  40 mg Oral Nightly    clopidogrel  75 mg Oral Daily    ferrous sulfate  325 mg Oral Daily with breakfast    sertraline  50 mg Oral Daily    sodium chloride flush  5-40 mL IntraVENous 2 times per day    heparin (porcine)  5,000 Units SubCUTAneous BID    busPIRone  5 mg Oral TID    pantoprazole  40 mg Oral Daily    predniSONE  5 mg Oral Daily with breakfast    ipratropium-albuterol  3 mL Inhalation 4x daily     PRN Meds: sodium chloride, glucose, glucagon (rDNA), dextrose, dextrose bolus (hypoglycemia) **OR** dextrose bolus (hypoglycemia), fluticasone, sodium chloride flush, sodium chloride, ondansetron **OR** ondansetron, polyethylene glycol, acetaminophen **OR** acetaminophen      Intake/Output Summary (Last 24 hours) at 3/31/2022 1223  Last data filed at 3/31/2022 0854  Gross per 24 hour   Intake 2631.59 ml   Output 200 ml   Net 2431. 59 ml Physical Exam Performed:    BP (!) 108/56   Pulse 74   Temp 97.4 °F (36.3 °C) (Axillary)   Resp 29   Ht 5' 4\" (1.626 m)   Wt 111 lb 14.4 oz (50.8 kg)   SpO2 93%   BMI 19.21 kg/m²     General appearance: No apparent distress, appears stated age and cooperative. HEENT: Pupils equal, round, and reactive to light. Conjunctivae/corneas clear. Neck: Supple, with full range of motion. No jugular venous distention. Trachea midline. Respiratory:  Normal respiratory effort. Clear to auscultation, bilaterally without Rales/Wheezes/Rhonchi. Cardiovascular: Regular rate and rhythm with normal S1/S2 without murmurs, rubs or gallops. Abdomen: Soft, non-tender, non-distended with normal bowel sounds. Musculoskeletal: No clubbing, cyanosis or edema bilaterally. Full range of motion without deformity. Skin: Skin color, texture, turgor normal.  No rashes or lesions. Neurologic:  Neurovascularly intact without any focal sensory/motor deficits. Cranial nerves: II-XII intact, grossly non-focal.  Psychiatric: Alert and oriented, thought content appropriate, normal insight  Capillary Refill: Brisk,3 seconds, normal   Peripheral Pulses: +2 palpable, equal bilaterally       Labs:   Recent Labs     03/30/22  1346 03/31/22  0530   WBC 28.2* 38.4*   HGB 11.6* 9.8*   HCT 36.0 31.0*    287     Recent Labs     03/30/22  1346 03/31/22  0530    141   K 3.6 3.1*   CL 95* 106   CO2 26 22   BUN 35* 35*   CREATININE 2.1* 2.2*   CALCIUM 9.5 8.2*     Recent Labs     03/30/22  1346 03/31/22  0530   AST 17 16   ALT 7* 6*   BILITOT 0.4 0.3   ALKPHOS 89 81     No results for input(s): INR in the last 72 hours.   Recent Labs     03/30/22  1644 03/30/22  2129 03/31/22  0307   TROPONINI 0.12* 0.33* 0.23*       Urinalysis:      Lab Results   Component Value Date    NITRU POSITIVE 03/30/2022    WBCUA  03/30/2022    BACTERIA 3+ 03/30/2022    RBCUA 3-4 03/30/2022    BLOODU MODERATE 03/30/2022    SPECGRAV 1.015 03/30/2022 GLUCOSEU Negative 03/30/2022       Radiology:  XR CHEST PORTABLE   Final Result   Interval placement of a left IJ approach central venous catheter with the tip   overlying the mid SVC, with no pneumothorax identified. 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 Diarrhea - recent Cdiff infection. Bacteremia - Ecoli on blood cultures  And urine culture from 3/30  IV rocephin. Empiric PO vanc pending Cdiff test - will need to be on this while on other Abx and for at least 10 days after  On levophed via left IJ CVC. COPD with AE. End stage COPD. Acute on chronic hypoxic resp failure. Mood disorder on zoloft. CAD  On plavix. BB off due to septic shock. Statin on hold. Hx of Gi bleed. Cont PPI. Monitor Hgb. Mod to severe protein calorie malnutrition. Supplement as tolerates. DVT Prophylaxis: heparin  Diet: ADULT DIET; Regular; Low Potassium (Less than 3000 mg/day);  Low Phosphorus (Less than 1000 mg)  Code Status: Full Code      Dispo - icu    Jackie Macias MD

## 2022-03-31 NOTE — PLAN OF CARE
Problem: Falls - Risk of:  Goal: Will remain free from falls  Description: Will remain free from falls  Outcome: Ongoing  Goal: Absence of physical injury  Description: Absence of physical injury  Outcome: Ongoing     Problem: Skin Integrity:  Goal: Will show no infection signs and symptoms  Description: Will show no infection signs and symptoms  Outcome: Ongoing  Goal: Absence of new skin breakdown  Description: Absence of new skin breakdown  Outcome: Ongoing     Problem: Discharge Planning:  Goal: Discharged to appropriate level of care  Description: Discharged to appropriate level of care  Outcome: Ongoing     Problem: Gas Exchange - Impaired:  Goal: Levels of oxygenation will improve  Description: Levels of oxygenation will improve  Outcome: Ongoing     Problem: Infection, Septic Shock:  Goal: Will show no infection signs and symptoms  Description: Will show no infection signs and symptoms  Outcome: Ongoing     Problem: Infection - Ventilator-Associated Pneumonia:  Goal: Absence of pulmonary infection  Description: Absence of pulmonary infection  Outcome: Ongoing     Problem: Serum Glucose Level - Abnormal:  Goal: Ability to maintain appropriate glucose levels will improve  Description: Ability to maintain appropriate glucose levels will improve  Outcome: Ongoing     Problem: Tissue Perfusion, Altered:  Goal: Circulatory function within specified parameters  Description: Circulatory function within specified parameters  Outcome: Ongoing     Problem: Venous Thromboembolism:  Goal: Will show no signs or symptoms of venous thromboembolism  Description: Will show no signs or symptoms of venous thromboembolism  Outcome: Ongoing  Goal: Absence of signs or symptoms of impaired coagulation  Description: Absence of signs or symptoms of impaired coagulation  Outcome: Ongoing

## 2022-03-31 NOTE — H&P
Hospital Medicine History & Physical      PCP: Enma Palm MD    Date of Service: Pt seen/examined on 3/30/22 and admitted on 3/30/22 to Inpatient    Chief Complaint   Patient presents with    Shortness of Breath     EMS states pt c/o sob, hx of COPD, oxygen was increased to 4L, sats have been normal throughout       History Of Present Illness: The patient is a [de-identified] y.o. female with PMH below, presents with SOB, ZENDEJAS, increased O demand, productive cough. Pt has hx of COPD and is usually on 2L O2. She reports over the last couple of days she has been feeling increasingly SOB. Her O2 requirement has gone up to 4L. She was initially hypotensive and then later became hypertensive, markedy tachycardic/tachypnic. This was after IVF. She was give IV metoprolol. Her VS stabilized. She related this episode to anxiety. She denies CP.       Past Medical History:        Diagnosis Date    [facility]JA [facility]) [facility]     Asthma     CAD (coronary artery disease)     CHF (congestive heart failure) (HCC)     unsure    Chronic anxiety     COPD (chronic obstructive pulmonary disease) (HCC)     GERD (gastroesophageal reflux disease) 9/9/2021    History of blood transfusion     Hyperlipidemia     Hypertension     MRSA (methicillin resistant staph aureus) culture positive 09/22/2019    + resp cx    OA (osteoarthritis) 8/12/2014    Thyroid disease        Past Surgical History:        Procedure Laterality Date    BRONCHOSCOPY  09/08/2019    Dr. Jose Wade - w/BAL    CARDIAC CATHETERIZATION  09/05/2019    Dr. Asya Burns 2/24/2020    COLONOSCOPY DIAGNOSTIC performed by Walt Mayfield DO at [facility] N/A 10/22/2021    COLONOSCOPY POLYPECTOMY SNARE/COLD BIOPSY performed by Aquiles Wilkins MD at [address] [place] [address] GRAFT N/A 9/19/2019    OFF PUMP CORONARY ARTERY BYPASS GRAFTING X2, INTERNAL MAMMARY ARTERY, SAPHENOUS VEIN GRAFT performed by Essie Sen MD at [facility] CATH  9/20/2021    [place] MIDLINE [place]20[place] [facility] [place]    MASTECTOMY, PARTIAL Left     SIGMOIDOSCOPY  02/27/2020    4 bands    SIGMOIDOSCOPY N/A 2/27/2020    FLEX SIG W/ BANDING SIGMOIDOSCOPY DIAGNOSTIC FLEXIBLE performed by Sarita Trejo DO at [address]       Medications Prior to Admission:    Prior to Admission medications    Medication Sig Start Date End Date Taking? Authorizing Provider   albuterol sulfate (PROAIR RESPICLICK) 757 (90 Base) MCG/ACT aerosol powder inhalation 2 puff: EVERY 6 HOURS 6/1/21   Historical Provider, MD   OXYGEN Inhale 2 L into the lungs 6/1/21   Historical Provider, MD   predniSONE (DELTASONE) 10 MG tablet 3 tabs a day for 5 days,  Then 2 tabs a day for 5 days ,  Then 1 tab daily 10/14/21   Willie Peres MD   miconazole nitrate 2 % OINT Apply topically 2 times daily 10/14/21   Willie Peres MD   busPIRone (BUSPAR) 10 MG tablet Take 10 mg by mouth 3 times daily Indications: Feeling Anxious    Historical Provider, MD   ondansetron (ZOFRAN-ODT) 4 MG disintegrating tablet Take 1 tablet by mouth every 8 hours as needed for Nausea or Vomiting 9/27/21   Danish Casas MD   furosemide (LASIX) 20 MG tablet Take 1 tablet by mouth See Admin Instructions Tuesday, friday 9/27/21   Danish Casas MD   magnesium oxide (MAG-OX) 400 (241.3 Mg) MG TABS tablet Take 1 tablet by mouth daily 9/28/21   Danish Casas MD   potassium & sodium phosphates (PHOS-NAK) 280-160-250 MG PACK Take 1 packet by mouth daily 9/27/21   Danish Casas MD   menthol-zinc oxide (CALMOSEPTINE) 0.44-20.6 % OINT ointment Apply topically 2 times daily as needed (Apply to buttocks until resolved) Max 30 ml per day.  9/14/21   Casey Martin MD   melatonin 5 MG TBDP disintegrating tablet Take 1 tablet by mouth nightly as needed (sleep) 9/14/21   Casey Martin MD   fluticasone (FLONASE) 50 MCG/ACT nasal spray 1 spray by Each Nostril route daily as needed for Rhinitis 9/14/21   Marisela Szymanski MD   pantoprazole (PROTONIX) 20 MG tablet Take 20 mg by mouth daily    Historical Provider, MD   ferrous sulfate (IRON 325) 325 (65 Fe) MG tablet Take 325 mg by mouth daily (with breakfast)    Historical Provider, MD   acetaminophen (TYLENOL) 500 MG tablet Take 500 mg by mouth every 6 hours as needed for Pain    Historical Provider, MD   ipratropium-albuterol (DUONEB) 0.5-2.5 (3) MG/3ML SOLN nebulizer solution Inhale 3 mLs into the lungs every 6 hours 9/24/19   Gina Christensen MD   atorvastatin (LIPITOR) 20 MG tablet Take 1 tablet by mouth nightly 9/24/19   Gina Christensen MD   metoprolol tartrate (LOPRESSOR) 25 MG tablet Take 1 tablet by mouth 2 times daily 9/24/19   Gina Christensen MD   docusate sodium (COLACE, DULCOLAX) 100 MG CAPS Take 100 mg by mouth 2 times daily 9/24/19   Yoalnda Chase MD   clopidogrel (PLAVIX) 75 MG tablet Take 1 tablet by mouth daily 9/7/19   Kristin West MD   budesonide-formoterol (SYMBICORT) 160-4.5 MCG/ACT AERO Inhale 2 puffs into the lungs 2 times daily    Historical Provider, MD   albuterol (PROVENTIL HFA) 108 (90 BASE) MCG/ACT inhaler Inhale 2 puffs into the lungs every 6 hours as needed for Wheezing or Shortness of Breath. 12/20/12   Ian Escobedo MD       Allergies:  Latex and Codeine    Social History:    TOBACCO:   reports that she quit smoking about 2 years ago. Her smoking use included cigarettes. She has a 25.00 pack-year smoking history. She has never used smokeless tobacco.  ETOH:   reports no history of alcohol use. Family History:  Reviewed in detail and negative for DM, Early CAD, Cancer (except as below).  Positive as follows:        Problem Relation Age of Onset    Cancer Mother     Heart Disease Father     Stroke Father     Heart Disease Sister     Stroke Sister        REVIEW OF SYSTEMS:   Pertinent positives/negatives as follows: SOB, ZENDEJAS, increased O demand, productive cough, and as discussed in HPI, otherwise a complete ROS performed and all other systems are negative. PHYSICAL EXAM PERFORMED:  BP (!) 105/57   Pulse 115   Temp 101.4 °F (38.6 °C) (Axillary)   Resp (!) 32   Ht 5' 4\" (1.626 m)   Wt 110 lb (49.9 kg)   SpO2 96%   BMI 18.88 kg/m²     GEN:  A&Ox3, mild increased WOB. O 4L O2.   HEENT:  NC/AT,EOMI, Dry MM, no erythema/exudates or visible masses. CVS:  Normal S1,S2. Tachy RRR. Without M/G/R.   LUNG:   Very diminished bilat. No wheezes, rales or rhonchi. Tachypnea. ABD:  Soft, ND/NT, BS+ x4. Without G/R.  EXT: 2+ pulses, no c/c/e. Brisk cap refill. PSY:  Thought process intact, affect anxious. MAMADOU:  CN III-XII intact, moves all 4 spontaneously, sensory grossly intact. SKIN: No rash or lesions on visible skin. Chart review shows recent radiographs:  XR CHEST PORTABLE    Result Date: 3/30/2022  EXAMINATION: ONE XRAY VIEW OF THE CHEST 3/30/2022 1:38 pm COMPARISON: 10/25/2021 HISTORY: ORDERING SYSTEM PROVIDED HISTORY: sob TECHNOLOGIST PROVIDED HISTORY: Reason for exam:->sob Reason for Exam: SOB FINDINGS: Status post CABG. Heart size stable. Bilateral ill-defined linear pulmonary opacities. No pneumothorax. No pleural effusion. Bilateral ill-defined linear pulmonary opacities could represent pulmonary edema or infection       EKG:    EKG 12 Lead [[id number]]    Collected: 03/30/22 2123    Updated: 03/31/22 0743     Ventricular Rate 102 BPM    Atrial Rate 102 BPM    P-R Interval 154 ms    QRS Duration 108 ms    Q-T Interval 396 ms    QTc Calculation (Bazett) 516 ms    P Axis 74 degrees    R Axis -66 degrees    T Axis 47 degrees    Diagnosis Sinus tachycardia with Premature supraventricular complexesLeft axis deviationLeft anterior fascicular blockNon-specific intra-ventricular conduction delayNonspecific ST abnormalityAbnormal ECGWhen compared with ECG of 30-MAR-2022 18:10, (unconfirmed)Vent.  rate has decreased BY [place] BPMConfirmed by Tillie Frankel MD, STEPHEN ([id number]) on 3/31/2022 7:43:50 AM   EKG 12 Lead [[id number]]    Collected: 03/30/22 1810    Updated: 03/31/22 0743     Ventricular Rate 171 BPM    Atrial Rate 174 BPM    P-R Interval 128 ms    QRS Duration 92 ms    Q-T Interval 214 ms    QTc Calculation (Bazett) 360 ms    R Axis -74 degrees    T Axis 93 degrees    Diagnosis Narrow QRS tachycardia ; undeterminedLeft axis deviationLeft anterior fascicular blockIncomplete right bundle branch blockPossible Anteroseptal infarct , age undeterminedAbnormal ECGWhen compared with ECG of 30-MAR-2022 15:50,Vent. rate has increased BY  81 BPMConfirmed by Tillie Frankel MD, STEPHEN ([id number]) on 3/31/2022 7:43:06 AM         Transthoracic Echocardiography Report (TTE)  Demographics  Patient Name [address] [place] [address]  Date of Study 08/31/2021 Gender Female  Patient Number [id number] Date of Birth 1940  Visit Number [id number] Age [de-identified] year(s)  Accession Number [id number] Room Number [facility]  Corporate ID [id number] Sonographer Maria Fernanda Toussaint, RT  Ordering Physician Ann Dubose., DO Interpreting Physician Jayson Porras MD  Procedure  Type of Study  TTE procedure: ECHOCARDIOGRAM COMPLETE 2D W DOPPLER W COLOR.  08/31/2021 10:23 AM  Height: 62 inches Weight: 115 pounds BSA: 1.51 m²  BP: 120/74 mmHg  Conclusions  Summary  -- Normal left ventricular systolic function with ejection fraction of 55-60%. No regional wall motion abnormalites are seen. Left  ventricular diastolic filling pressure is elevated. Compared to previous study from 9-4-2019 no changes noted in left ventricular function. Mild mitral regurgitation.   Signature  Electronically signed by Juan Rankin MD (Interpreting physician) on 08/31/2021 at 12:11 PM    CBC:  Recent Labs     03/30/22  1346   WBC 28.2*   HGB 11.6*   HCT 36.0           RENAL  Recent Labs     03/30/22  1346      K 3.6   CL 95*   CO2 26   BUN 35*   CREATININE 2.1*   GLUCOSE 135*       Hemoglobin a1c:  Lab Results   Component Value Date    LABA1C 5.4 01/10/2022    LABA1C 5.3 10/17/2021    LABA1C 5.0 09/15/2021       LFT'S:  Recent Labs     03/30/22  1346   AST 17   ALT 7*   BILITOT 0.4   ALKPHOS 89       CARDIAC ENZYMES:   Recent Labs     03/30/22  1346 03/30/22  1644   TROPONINI 0.16* 0.12*     Lab Results   Component Value Date    PROBNP 17,519 (H) 03/30/2022    PROBNP 9,920 (H) 10/17/2021    PROBNP 5,248 (H) 10/12/2021       LACTIC ACID:  Recent Labs     03/30/22  1346 03/30/22  2129   LACTSEPSIS 1.3 2.1*       U/A:  Recent Labs     03/30/22  1346   LEUKOCYTESUR LARGE*   BACTERIA 3+*   WBCUA *   COLORU Yellow   RBCUA 3-4   CLARITYU SL CLOUDY*   SPECGRAV 1.015   BLOODU MODERATE*   GLUCOSEU Negative     VBG:  Recent Labs     03/30/22  1346   PHVEN 7.456*   TPJ7QSB 41.7   [id number] 28.7   PO2VEN 122.8*   [id number] 99        PHYSICIAN CERTIFICATION  I certify that Patience Velez is expected to be hospitalized for 2 midnights based on the following assessment and plan:    ASSESSMENT/PLAN:  1. Sepsis. IV vanco and Merrem. IVF. F/u cx. PCT > 100.  2. Acute on chronic respiratory failure with hypoxia, likely related to HCAP, aeCOPD, supplemental O2 to maintain SPO2 ? 92%, continuous pulse ox. Currently requiring 4 L O2. Patient normally on 2 L O2 at home. Wean as tolerated. Pt has required emergent intubation and has hx of rapid decline in the past.  Pulm c/s.   3. UTI, f/u cx.   4. PNA (HAP), IBD, resp cx. 5. aeCOPD, IV solumedrol, IV ABX as above. PRN/SAMANTA intensive NEB therapy. Check respiratory culture. Pulm Hold home regimen for now. 6. AedCHF? Pt appears dry. However may have had acute vol OL 2/2 2L boluses in ED when RR, HR and BP went up markedly. Caution w/ boluses. 7. NADJA, Cr 2.1, baseline ~1.0. Repeat in am   8. Clincal dehydration. 9. Numerous med rec errors were corrected w/ ICU RN. DVT Prophylaxis: Lx  Diet: gen low K  Code Status: Full Code  PT/OT Eval Status:  Will order if needed and as patient condition allows  Dispo - Admit to inpatient ICU    Total critical care time caring for this patient with life threatening, unstable organ failure, including direct patient contact, management of life support systems, review of data including imaging and labs, discussions with other team members and physicians is 34 minutes so far today, excluding procedures. Brinda Hussein MD    Thank you Yosi Dean MD for the opportunity to be involved in this patient's care. If you have any questions or concerns please feel free to contact me via the [employer] at [phone]. This chart was generated using the [address] [place] [address] [address] dictation system. I created this record but it may contain dictation errors given the limitations of this technology.

## 2022-03-31 NOTE — PROGRESS NOTES
BP remains low, Dr. Darin Driscoll orders bolus, stool sample sent to lab for C-Diff, isolation ordered.

## 2022-03-31 NOTE — PROGRESS NOTES
Left CVC placed, awaiting x-ray for placement, bolus complete. Patient has had total of 3 liters bolus and has NS infusing at 100ml/hr. Magnesium was not to start until BP slightly better. Will start levophed.

## 2022-03-31 NOTE — PROCEDURES
A time-out was completed verifying correct patient, procedure, site, positioning, and special equipment if applicable. The patient was placed in a dependent position appropriate for central line placement based on the vein to be cannulated. The patients left neck was prepped and draped in sterile fashion. 1% Lidocaine was used to anesthetize the surrounding skin area. A 20 cm triple lumen catheter was introduced into the the internal jugular using the Seldinger technique with ultrasound guidance. The catheter was threaded smoothly over the guide wire and appropriate dark blood return was obtained. Each lumen of the catheter was evacuated of air and flushed with sterile saline. The catheter was then sutured in place to the skin and a sterile dressing applied. I was present for the entire procedure. Estimated Blood Loss: <10cc    The patient tolerated the procedure well and there were no complications. Post procedure CXR was ordered and is pending. Image will be reviewed prior to use of the line.         Lorraine Camilo M.D.

## 2022-03-31 NOTE — CONSULTS
resistant staph aureus) culture positive, OA (osteoarthritis), and Thyroid disease. Surgical History:   has a past surgical history that includes  section; Mastectomy, partial (Left); bronchoscopy (2019); Cardiac catheterization (2019); Coronary artery bypass graft (N/A, 2019); Colonoscopy (N/A, 2020); Sigmoidoscopy (2020); sigmoidoscopy (N/A, 2020); IR MIDLINE CATH (2021); and Colonoscopy (N/A, 10/22/2021). Social History:   reports that she quit smoking about 2 years ago. Her smoking use included cigarettes. She has a 25.00 pack-year smoking history. She has never used smokeless tobacco. She reports that she does not drink alcohol and does not use drugs. Family History:  family history includes Cancer in her mother; Heart Disease in her father and sister; Stroke in her father and sister. Home Medications:  Were reviewed and are listed in nursing record. and/or listed below  Prior to Admission medications    Medication Sig Start Date End Date Taking?  Authorizing Provider   busPIRone (BUSPAR) 5 MG tablet Take 5 mg by mouth 3 times daily   Yes Historical Provider, MD   Fluticasone-Umeclidin-Vilant (TRELEGY ELLIPTA) 200-62.5-25 MCG/INH AEPB Inhale 1 puff into the lungs daily   Yes Historical Provider, MD   albuterol (PROVENTIL) (2.5 MG/3ML) 0.083% nebulizer solution Take 2.5 mg by nebulization every 4 hours as needed for Wheezing   Yes Historical Provider, MD   sertraline (ZOLOFT) 50 MG tablet Take 50 mg by mouth daily   Yes Historical Provider, MD   predniSONE (DELTASONE) 5 MG tablet Take 5 mg by mouth daily   Yes Historical Provider, MD   guaiFENesin (MUCINEX) 600 MG extended release tablet Take 600 mg by mouth 2 times daily   Yes Historical Provider, MD   pantoprazole (PROTONIX) 40 MG tablet Take 40 mg by mouth daily   Yes Historical Provider, MD   Roflumilast (DALIRESP) 500 MCG tablet Take 500 mcg by mouth daily   Yes Historical Provider, MD   OXYGEN Inhale 2 L into the lungs 6/1/21   Historical Provider, MD   ondansetron (ZOFRAN-ODT) 4 MG disintegrating tablet Take 1 tablet by mouth every 8 hours as needed for Nausea or Vomiting 9/27/21   Екатерина Leung MD   furosemide (LASIX) 20 MG tablet Take 1 tablet by mouth See Admin Instructions Tuesday, friday 9/27/21   Екатерина Leung MD   magnesium oxide (MAG-OX) 400 (241.3 Mg) MG TABS tablet Take 1 tablet by mouth daily 9/28/21   Екатерина Leung MD   fluticasone (FLONASE) 50 MCG/ACT nasal spray 1 spray by Each Nostril route daily as needed for Rhinitis 9/14/21   Marisela Szymanski MD   ferrous sulfate (IRON 325) 325 (65 Fe) MG tablet Take 325 mg by mouth daily (with breakfast)    Historical Provider, MD   acetaminophen (TYLENOL) 500 MG tablet Take 500 mg by mouth every 6 hours as needed for Pain    Historical Provider, MD   atorvastatin (LIPITOR) 20 MG tablet Take 1 tablet by mouth nightly  Patient taking differently: Take 40 mg by mouth nightly  9/24/19   Gina Christensen MD   metoprolol tartrate (LOPRESSOR) 25 MG tablet Take 1 tablet by mouth 2 times daily 9/24/19   Gina Christensen MD   docusate sodium (COLACE, DULCOLAX) 100 MG CAPS Take 100 mg by mouth 2 times daily  Patient taking differently: Take 100 mg by mouth 2 times daily as needed  9/24/19   Gina Christensen MD   clopidogrel (PLAVIX) 75 MG tablet Take 1 tablet by mouth daily 9/7/19   Kristin West MD        Allergies:  Latex and Codeine     Review of Systems:   12 point ROS negative in all areas as listed below except as in [place]  Constitutional, EENT, Cardiovascular, pulmonary, GI, , Musculoskeletal, skin, neurological, hematological, endocrine, Psychiatric    Physical Examination:    Vitals:    03/31/22 0702   BP: (!) 108/56   Pulse: 74   Resp: 23   Temp:    SpO2: 96%    Weight: 111 lb 14.4 oz (50.8 kg)         General Appearance:  Alert, cooperative, no distress, appears stated age   Head:  Normocephalic, without obvious abnormality, atraumatic   Eyes: PERRL, conjunctiva/corneas clear       Nose: Nares normal, no drainage or sinus tenderness   Throat: Lips, mucosa, and tongue normal   Neck: Supple, symmetrical, trachea midline, no adenopathy, thyroid: not enlarged, symmetric, no tenderness/mass/nodules, no carotid bruit or JVD       Lungs:   Clear to auscultation bilaterally, respirations unlabored   Chest Wall:  No tenderness or deformity   Heart:  Regular rate and rhythm, S1, S2 normal, no murmur, rub or gallop   Abdomen:   Soft, non-tender, bowel sounds active all four quadrants,  no masses, no organomegaly           Extremities: Extremities normal, atraumatic, no cyanosis or edema   Pulses: 2+ and symmetric   Skin: Skin color, texture, turgor normal, no rashes or lesions   Pysch: Normal mood and affect   Neurologic: Normal gross motor and sensory exam.         Labs  CBC:   Lab Results   Component Value Date    WBC 38.4 03/31/2022    RBC 3.34 03/31/2022    HGB 9.8 03/31/2022    HCT 31.0 03/31/2022    MCV 92.8 03/31/2022    RDW 13.3 03/31/2022     03/31/2022     CMP:    Lab Results   Component Value Date     03/31/2022    K 3.1 03/31/2022     03/31/2022    CO2 22 03/31/2022    BUN 35 03/31/2022    CREATININE 2.2 03/31/2022    GFRAA 26 03/31/2022    GFRAA >60 05/08/2013    AGRATIO 1.1 03/31/2022    LABGLOM 21 03/31/2022    GLUCOSE 169 03/31/2022    PROT 4.9 03/31/2022    PROT 7.81 12/20/2012    CALCIUM 8.2 03/31/2022    BILITOT 0.3 03/31/2022    ALKPHOS 81 03/31/2022    AST 16 03/31/2022    ALT 6 03/31/2022     PT/INR:  No results found for: PTINR  Lab Results   Component Value Date    CKTOTAL 96 11/03/2015    TROPONINI 0.23 (H) 03/31/2022       EKG:  I have reviewed EKG with the following interpretation:  Impression:  See HPI    EKG 3/30/2022  Normal sinus rhythm with sinus arrhythmiaRSR' or QR pattern in V1 suggests right ventricular conduction delayBaseline artifactLeft axis deviationLeft anterior fascicular blockInferior infarct , age undeterminedAbnormal ECGNo previous ECGs availableConfirmed by TOR Lawrence MD ([id number]) on 3/30/2022 5:47:16 PM     EKG 3/230/22   Narrow QRS tachycardia ; undeterminedLeft axis deviationLeft anterior fascicular blockIncomplete right bundle branch blockPossible Anteroseptal infarct , age undeterminedAbnormal ECGWhen compared with ECG of 30-MAR-2022 15:50,Vent. rate has increased BY  81 BPMConfirmed by TOR Lawrence MD [id number]) on 3/31/2022 7:43:06 AM     EKG 3/31/2022  Sinus tachycardia with Premature supraventricular complexesLeft axis deviationLeft anterior fascicular blockNon-specific intra-ventricular conduction delayNonspecific ST abnormalityAbnormal ECGWhen compared with ECG of 30-MAR-2022 18:10, (unconfirmed)Vent. rate has decreased BY  69 BPMConfirmed by TOR HERNANDEZ MD ([id number]) on 3/31/2022 7:43:50 AM     CXR 3/30/2022  FINDINGS:   Status post CABG. Heart size stable. Bilateral ill-defined linear pulmonary   opacities. No pneumothorax. No pleural effusion. Impression   Bilateral ill-defined linear pulmonary opacities could represent pulmonary   edema or infection     CXR 3/31/2022  Interval placement of a left IJ approach central venous catheter with the tip overlying the mid SVC, with no pneumothorax identified. Camryn-Myoview 9/1/2021  Summary  Normal LV function. There is uniform isotope uptake at stress and rest. There is no evidence of  myocardial ischemia. TTE September 2019:   Summary   Left ventricular cavity size is normal. Normal left ventricular wall   thickness. Overall left ventricular systolic function appears low normal   with an estimated ejection fraction of 50%. No regional wall motion   abnormalities are noted. Diastolic function is indeterminate. Mild mitral regurgitation is present. The right ventricle is normal in size and function. [facility] 9/2019  LEFT MAIN:  Left main does have ostial disease of approximately 70%.   There is catheter dampening upon engagement of the left main. LEFT ANTERIOR DESCENDING:  The LAD course to and wraps around the apex. Gives off a large diagonal branch with multiple terminal divisions. Just at the diagonal branch, it was free of significant obstructive disease. The LAD does continue, and just after the large diagonal branch, has 90% stenosis in the LAD proper. LEFT CIRCUMFLEX:  Circumflex is small nondominant vessel. It consists  essentially of a single marginal branch. It is free of significant  obstructive disease. RIGHT CORONARY ARTERY:  Right coronary artery is very large dominant  vessel. It gives off a large PDA and two large posterolateral branches. Right coronary artery is free of significant obstructive disease. IMPRESSION:  1.  LV dysfunction with estimated ejection fraction of 35 to 40% with  inferior hypo to akinesis. 2.  Coronary artery disease as described above including ostial left main disease (with catheter dampening upon engagement) and mid LAD  disease. 3.  Recommend surgical consultation. 4.  Successful Angio-Seal of right femoral arteriotomy site. 5.  Estimated blood loss less than 5-10cc. Assessment:  Vinicio Joe is a [de-identified] y.o. patient who presented to [facility] 3/30/2022 with c/o SOB with 2L at baseline and cough. She has a PMH notable for COPD, CAD s/p 2V CABG 9/2019 (LIMA to LAD, SVG to Diagonal), HTN,and HLD. Most recent Cath 9/4/2019 noted EF=35-40%; CAD involving ostial left main disease and mid LAD disease. Most recent Echo 8/31/2021 EF=55-60%. Most recent Camryn-Myoview 9/1/2021 noted EF=55% negative for ischemia. She arrived by EMS with normal sats on increased O2 at 4L. Upon arrival to ED she was hypotensive and given 2L of fluids. She is vague historian. Tells me she doesn't feel good and c/o mainly malaise, strong odor in urine, and some SOB. Also has occasional palpitations. CBC showed leukocytosis;  Admit EKG SR with SA; RSR' in V1 ; left axis deviation; LAFB; inferior infarct (no change 10/17). Second EKG with narrow QRS tachycardia 171bpm and 3rd EKG  with PAC's; BNP of 17,516; Gilberto 0.16 and 0.12, 0.33, 0.23; Cr 2.1; Mg+ 1.5, 2.8; COVID and flu are negative. Blood cultures were sent. Urine positive nitrites large leukocytes with  WBCs +3 bacteria. Note CXR suggested pulmonary edema or infection. Diagnosis of elevated Gilberto, tachycardia, and BNP in older female with UTI, sepsis, and NADJA with hx CAD s/p 2V CABG in 9/19. Recs:  1. Elevated Gilberto likely related to demand ischemia from low BP, tachycardia, NADJA. There are no concerning symptoms for angina currently. 2. Tachycardia likely due to sepsis and HR improved with IVF and abx. 3. Continue current med regimen and treat UTI/sepsis accordingly. 4. No need for cardiac testing at this time. Signing off. Call if further questions. Thanks.        Patient Active Problem List   Diagnosis    Hyperlipidemia    Asthma    OA (osteoarthritis)    Tobacco use    COPD exacerbation (HCC)    Septicemia ([address]    Abnormal chest x-ray    COPD with acute exacerbation ([address]    Shortness of breath    Tobacco abuse    Moderate malnutrition ([address]    NSTEMI (non-ST elevated myocardial infarction) ([address]    Acute respiratory failure with hypoxia (HCC)    CAD (coronary artery disease)    Acute pulmonary edema (HCC)    Pulmonary infiltrate    Elevated brain natriuretic peptide (BNP) level    Leukocytosis    Ischemic cardiomyopathy    Acute combined systolic and diastolic congestive heart failure (HCC)    Hypernatremia    NADJA (acute kidney injury) ([address]    Status post aorto-coronary artery bypass graft    Mucus plugging of bronchi    CAD in native artery    S/P CABG (coronary artery bypass graft)    Pneumonia of both lower lobes due to methicillin resistant Staphylococcus aureus (MRSA) (HCC)    GI bleed    Severe malnutrition (HCC)    Chest pain    Chronic respiratory failure with hypoxia ([address]    Primary hypertension    Anemia    Acute respiratory failure (HCC)    Acute respiratory distress    Volume overload    Demand ischemia (HCC)    GERD (gastroesophageal reflux disease)    Acute respiratory failure with hypoxia and hypercapnia (HCC)    Shock (HCC)    Pneumonia due to infectious organism    Acute on chronic respiratory failure with hypoxia and hypercapnia (HCC)    Chronic obstructive pulmonary disease (HCC)    Acute on chronic respiratory failure with hypoxemia (HCC)    Chronic anxiety    Congestive heart failure (HCC)    Acute respiratory failure with hypoxia and hypercarbia (HCC)    Acute kidney injury (HCC)    Elevated procalcitonin    COPD, severe (HCC)    Anxiety    Severe anxiety    Severe protein-calorie malnutrition ([address]    HCAP (healthcare-associated pneumonia)    Pleural effusion    Hyperglycemia    Sepsis ([address]       Thank you for allowing to us to participate in the care or [place]. Further evaluation will be based upon the patient's clinical course and testing results.

## 2022-03-31 NOTE — FLOWSHEET NOTE
03/30/22 2100   Vital Signs   Temp 97.8 °F (36.6 °C)   Temp Source Oral   Pulse 105   Heart Rate Source Monitor   Resp 25   BP (!) 68/41   BP Location Left upper arm   MAP (mmHg) (!) 51   Level of Consciousness Alert (0)   MEWS Score 6   Oxygen Therapy   SpO2 97 %   O2 Device Nasal cannula   O2 Flow Rate (L/min) 4 L/min   Height and Weight   Height 5' 4\" (1.626 m)   Weight 111 lb 14.4 oz (50.8 kg)   BSA (Calculated - sq m) 1.51 sq meters   BMI (Calculated) 19.2   Patient to room, alert and oriented, BP low, will reassess, thready pulse noted, crackles to left lobes per auscultation, right lobes diminished, patient on 4 liters NC, states she wears 2 liters at home and has a BiPAP, does not know settings, patient active with [facility], states has not felt well for past several days. Has finished second antibiotic Monday for CDiff infection, will order isolation and stool sample, patient still having diarrhea. Skin dry, poor turgor. BP low. Will notify hospitalist.  Call light given and explained.

## 2022-03-31 NOTE — CONSULTS
Patient is being seen at the request of CLIFTON eBth CNP for a consultation for sepsis    HISTORY OF PRESENT ILLNESS:   [de-identified]years old with history of CAD, COPD presented low BP for 3 days. Mild to moderate. Associated with urine smelling lately. Not sure what makes better or worse. with shortness of breath. Chronically on home O2 2 L. Work-up in ED revealed NADJA, leukocytosis with elevated procalcitonin. Blood culture positive for E. Coli. History C diff for 14 days. Cough with white sputum. No hemoptysis. No smoking.    Levophed 2 mcg/min   BiPAP overnight 14/7    PAST MEDICAL HISTORY:  Past Medical History:   Diagnosis Date    NADJA (acute kidney injury) ([facility] [address]     Asthma     CAD (coronary artery disease)     CHF (congestive heart failure) (HCC)     unsure    Chronic anxiety     COPD (chronic obstructive pulmonary disease) (HCC)     GERD (gastroesophageal reflux disease) 9/9/2021    History of blood transfusion     Hyperlipidemia     Hypertension     MRSA (methicillin resistant staph aureus) culture positive 09/22/2019    + resp cx    OA (osteoarthritis) 8/12/2014    Thyroid disease      PAST SURGICAL HISTORY:  Past Surgical History:   Procedure Laterality Date    BRONCHOSCOPY  09/08/2019    Dr. Harrison Tubbs - w/BAL    CARDIAC CATHETERIZATION  09/05/2019    Dr. Ary Mendez 2/24/2020    COLONOSCOPY DIAGNOSTIC performed by Sarita Trejo DO at [place] [address] N/A 10/22/2021    COLONOSCOPY POLYPECTOMY SNARE/COLD BIOPSY performed by Kwame Jc MD at [address] [place] [address] N/A 9/19/2019    OFF PUMP CORONARY ARTERY BYPASS GRAFTING X2, INTERNAL MAMMARY ARTERY, SAPHENOUS VEIN GRAFT performed by Essie Sen MD at [facility]  9/20/2021     MIDLINE CATH 9/20/2021 [facility] SPECIAL PROCEDURES    MASTECTOMY, PARTIAL Left     SIGMOIDOSCOPY  02/27/2020    4 bands    SIGMOIDOSCOPY N/A 2/27/2020    FLEX SIG W/ BANDING SIGMOIDOSCOPY DIAGNOSTIC FLEXIBLE performed by Jostin Loza DO at [address] [place] [address]:  family history includes Cancer in her mother; Heart Disease in her father and sister; Stroke in her father and sister. SOCIAL HISTORY:   reports that she quit smoking about 2 years ago. Her smoking use included cigarettes. She has a 25.00 pack-year smoking history. She has never used smokeless tobacco.    Scheduled Meds:   vancomycin  750 mg IntraVENous Once    atorvastatin  20 mg Oral Nightly    clopidogrel  75 mg Oral Daily    ferrous sulfate  325 mg Oral Daily with breakfast    [START ON 4/1/2022] furosemide  20 mg Oral Once per day on Tue Fri    sertraline  50 mg Oral Daily    sodium chloride flush  5-40 mL IntraVENous 2 times per day    heparin (porcine)  5,000 Units SubCUTAneous BID    vancomycin (VANCOCIN) intermittent dosing (placeholder)   Other RX Placeholder    busPIRone  5 mg Oral TID    pantoprazole  40 mg Oral Daily    methylPREDNISolone  40 mg IntraVENous Q12H    predniSONE  5 mg Oral Daily with breakfast    Roflumilast  500 mcg Oral Daily    ipratropium-albuterol  3 mL Inhalation 4x daily    cefepime  2,000 mg IntraVENous Q24H     Continuous Infusions:   norepinephrine 4 mcg/min (03/31/22 0547)    sodium chloride      sodium chloride 100 mL/hr at 03/31/22 0547     PRN Meds:  fluticasone, sodium chloride flush, sodium chloride, ondansetron **OR** ondansetron, polyethylene glycol, acetaminophen **OR** acetaminophen, metoprolol    ALLERGIES:  Patient is allergic to latex and codeine.     REVIEW OF SYSTEMS:  Constitutional: Negative for fever  HENT: Negative for sore throat  Eyes: Negative for redness   Respiratory: + dyspnea, cough  Cardiovascular: Negative for chest pain  Gastrointestinal: Negative for vomiting, diarrhea   Genitourinary: Negative for hematuria   Musculoskeletal: +  arthralgias   Skin: Negative for rash  Neurological: Negative for syncope  Hematological: Negative for adenopathy  Psychiatric/Behavorial: Negative for anxiety    PHYSICAL EXAM:  Blood pressure (!) 108/56, pulse 74, temperature 97.4 °F (36.3 °C), temperature source Axillary, resp. rate 23, height 5' 4\" (1.626 m), weight 111 lb 14.4 oz (50.8 kg), SpO2 96 %, not currently breastfeeding.' on 4LPM  Gen: + distress. Eyes: PERRL. No sclera icterus. No conjunctival injection. ENT: No discharge. Pharynx clear. Neck: Trachea midline. No obvious mass. Resp: No accessory muscle use. Few crackles. No wheezes. No rhonchi. No dullness on percussion. CV: Regular rate. Regular rhythm. No murmur or rub. No edema. GI: Non-tender. Non-distended. No hernia. Skin: Warm and dry. No nodule on exposed extremities. Lymph: No cervical LAD. No supraclavicular LAD. M/S: No cyanosis. No joint deformity. No clubbing. Neuro: Awake. Alert. Moves all four extremities. Psych: Oriented x 3. No anxiety. LABS:  CBC:   Recent Labs     03/30/22  1346 03/31/22  0530   WBC 28.2* 38.4*   HGB 11.6* 9.8*   HCT 36.0 31.0*   MCV 91.4 92.8    287     BMP:   Recent Labs     03/30/22  1346 03/31/22  0530    141   K 3.6 3.1*   CL 95* 106   CO2 26 22   BUN 35* 35*   CREATININE 2.1* 2.2*     LIVER PROFILE:   Recent Labs     03/30/22  1346 03/31/22  0530   AST 17 16   ALT 7* 6*   BILITOT 0.4 0.3   ALKPHOS 89 81     PT/INR: No results for input(s): PROTIME, INR in the last 72 hours. APTT: No results for input(s): APTT in the last 72 hours. UA:  Recent Labs     03/30/22  1346   COLORU Yellow   PHUR 6.0   WBCUA *   RBCUA 3-4   BACTERIA 3+*   CLARITYU SL CLOUDY*   SPECGRAV 1.015   LEUKOCYTESUR LARGE*   UROBILINOGEN 0.2   BILIRUBINUR Negative   BLOODU MODERATE*   GLUCOSEU Negative     No results for input(s): PHART, KQA3SIE, PO2ART in the last 72 hours.     Chest x-ray 3/30 imaging was reviewed by me and showed   Bilateral ill-defined airspace disease      Echo 8/29/2021  Normal left ventricular systolic function with ejection fraction of   55-60%. No regional wall motion abnormalites are seen. Left ventricular   diastolic filling pressure is elevated. Compared to previous study from 9-4-2019 no changes noted in left   ventricular function.    Mild mitral regurgitation      ASSESSMENT:  · Acute hypoxemic respiratory failure  · Septic shock  · E. coli bacteremia  · Acute kidney injury  · UTI  · Elevated troponin  · History of history of C. difficile troponin  · COPD chronically on home O2 2.5 L and AVAPS- not in exacerbation   · Chronic anxiety with panic attacks  · CAD post CABG 2019     PLAN:  Supplemental oxygen to maintain SaO2 >92%; wean as tolerated  BiPAP QHS and PRN during the day   Closely monitory airways, clinical status, cardiac rhythm, vital signs, urine output and NG output   Inhaled bronchodilators  D/C IV solumedrol   Home dose 5 mg prednisone   Established venous access  IV fluid resuscitation with crystalloid targeting (30 mL/kg actual body weight) targeting CVP 8-12 and SBP>90  Maintenance IVF NS at 100 cc/hr  Broad spectrum antibiotics to include Ceftriaxone- d/c vanc and cefepime  IV  Add Vanc  mg PO QID   IV Levophed to keep MAP > 65 mmHg or SBP>90  Follow up Cx   Cardiology following   Lactic acid every 6 hours to monitor clearance  Blood sugar control, with goal 140-180  DVT prophylaxis: Heparin SQ  MRSA prophylaxis: Bactroban  Code status: Full code

## 2022-03-31 NOTE — PROGRESS NOTES
AM assessment completed. See flowsheet. A/O x 4. States she is not feeling well this AM. Lungs sounds diminished throughout. NSR on bedside monitor. Levophed infusing at 4 mcg/kg/min to maintain MAP >65. Bowel sounds active. No edema noted. Urinary continence. Labs reviewed. Cont to monitor.

## 2022-03-31 NOTE — PROGRESS NOTES
RT Inhaler-Nebulizer Bronchodilator Protocol Note    There is a bronchodilator order in the chart from a provider indicating to follow the RT Bronchodilator Protocol and there is an Initiate RT Inhaler-Nebulizer Bronchodilator Protocol order as well (see protocol at bottom of note). CXR Findings:  XR CHEST PORTABLE    Result Date: 3/30/2022  Bilateral ill-defined linear pulmonary opacities could represent pulmonary edema or infection       The findings from the last RT Protocol Assessment were as follows:   History Pulmonary Disease: Chronic pulmonary disease  Respiratory Pattern: Mild dyspnea at rest, irregular pattern, or RR 21-25 bpm  Breath Sounds: Slightly diminished and/or crackles  Cough: Strong, spontaneous, non-productive  Indication for Bronchodilator Therapy: On home bronchodilators  Bronchodilator Assessment Score: 8    Aerosolized bronchodilator medication orders have been revised according to the RT Inhaler-Nebulizer Bronchodilator Protocol below. Respiratory Therapist to perform RT Therapy Protocol Assessment initially then follow the protocol. Repeat RT Therapy Protocol Assessment PRN for score 0-3 or on second treatment, BID, and PRN for scores above 3. No Indications - adjust the frequency to every 6 hours PRN wheezing or bronchospasm, if no treatments needed after 48 hours then discontinue using Per Protocol order mode. If indication present, adjust the RT bronchodilator orders based on the Bronchodilator Assessment Score as indicated below. Use Inhaler orders unless patient has one or more of the following: on home nebulizer, not able to hold breath for 10 seconds, is not alert and oriented, cannot activate and use MDI correctly, or respiratory rate 25 breaths per minute or more, then use the equivalent nebulizer order(s) with same Frequency and PRN reasons based on the score.   If a patient is on this medication at home then do not decrease Frequency below that used at home.    0-3 - enter or revise RT bronchodilator order(s) to equivalent RT Bronchodilator order with Frequency of every 4 hours PRN for wheezing or increased work of breathing using Per Protocol order mode. 4-6 - enter or revise RT Bronchodilator order(s) to two equivalent RT bronchodilator orders with one order with BID Frequency and one order with Frequency of every 4 hours PRN wheezing or increased work of breathing using Per Protocol order mode. 7-10 - enter or revise RT Bronchodilator order(s) to two equivalent RT bronchodilator orders with one order with TID Frequency and one order with Frequency of every 4 hours PRN wheezing or increased work of breathing using Per Protocol order mode. 11-13 - enter or revise RT Bronchodilator order(s) to one equivalent RT bronchodilator order with QID Frequency and an Albuterol order with Frequency of every 4 hours PRN wheezing or increased work of breathing using Per Protocol order mode. Greater than 13 - enter or revise RT Bronchodilator order(s) to one equivalent RT bronchodilator order with every 4 hours Frequency and an Albuterol order with Frequency of every 2 hours PRN wheezing or increased work of breathing using Per Protocol order mode.          Electronically signed by Rene Curling, RCP on 3/30/2022 at 11:06 PM

## 2022-03-31 NOTE — CARE COORDINATION
Case Management Assessment  Initial Evaluation      Patient Name: Omar Dior  YOB: 1940  Diagnosis: Septicemia ([facility] [A41.9]  Elevated troponin [R77.8]  NADJA (acute kidney injury) ([facility] [N17.9]  Sepsis ([facility] [A41.9]  Date / Time: 3/30/2022  1:14 PM    Admission status/Date: 03/30/2022 Inpatient   Chart Reviewed: Yes      Patient Interviewed: Yes   Family Interviewed:  No      Hospitalization in the last 30 days:  No      Health Care Decision Maker :   Primary Decision MakerGisejamir Rebeka  Child - [phone]    Secondary Decision Maker: Hafsa Tapia - Niece/Nephew - [phone]    (CM - must 1st enter selection under Navigator - emergency contact- [place] Relationship and pick relationship)   Who do you trust or have selected to make healthcare decisions for you      Met with: pt at bedside    Current PCP: Raza Vaca MD    Financial  Medicare and [facility] [address] required for SNF : N          3 night stay required - N    ADLS  Support Systems/Care Needs: [address] [facility] Staff  Transportation: family    Meal Preparation: family    Housing  Living Arrangements: pt lives at home with her son. Steps: pt did not state  Intent for return to present living arrangements: Yes  Identified Issues: [address] [address] [facility] with [address] : Yes - [address] [facility]) Agency:(Services) Kelle Villa aware and states she will put a note in epic  Type of Home Care Services: Nursing Services  Passport/Waiver : No  :                      Phone Number:    Passport/Waiver Services: [address]   DME Provider: Luca Fallon states pt has an NIV and o2 at 2 liters continuous.  Britt Wright states pt will need re-certified on discharge for home o2  Equipment:   Walker_x__Cane___RTS___ BSC_x__Shower Chair___Hospital Bed___W/C__x__Other__Electric wheelchair and raised toilet seat and NIV______  02 at _2 1/2___Liter(s)---wears(frequency)_cont______ HHN ___ CPAP___ BiPap___   N/A____      Home O2 Use :  Yes    If No for home O2---if presently on O2 during hospitalization:  Yes  if yes CM to follow for potential DC O2 need  Informed of need for care provider to bring portable home O2 tank on day of discharge for nursing to connect prior to leaving:   Yes  Verbalized agreement/Understanding:   Yes    Community Service Affiliation  Dialysis:  No    · Agency:  · Location:  · Dialysis Schedule:  · Phone:   · Fax: Other Community Services: Private duty RN who is with pt during the day that she hired    DISCHARGE PLAN: Explained Case Management role/services. Chart review completed. Met with pt at bedside. Pt states she is independent and plans on returning home. She denied needs for CM at this time. CM will follow. Please notify CM if needs or concerns arise.      Emeka Leiva MSW, LISW-S

## 2022-03-31 NOTE — PROGRESS NOTES
Patient admitted to room 3010 from ER. Patient oriented to room, call light, bed rails, phone, lights and bathroom. Patient instructed about the schedule of the day including: vital sign frequency, lab draws, possible tests, frequency of MD and staff rounds, daily weights, I &O's and prescribed diet. Actve bed alarm in place, patient aware of placement and reason. Active Telemetry box in place, patient aware of placement and reason. Bed locked, in lowest position, side rails up 2/4, call light within reach. Recliner Assessment  Patient is not able to demonstrated the ability to move from a reclining position to an upright position within the recliner. however patient is alert, oriented and able to provide informed consent    4 Eyes Skin Assessment     The patient is being assess for   Admission    I agree that 2 RN's have performed a thorough Head to Toe Skin Assessment on the patient. ALL assessment sites listed below have been assessed. Areas assessed for pressure by both nurses:  Cindi Avalosph and Jennie[x]   Head, Face, and Ears   [x]   Shoulders, Back, and Chest, Abdomen  [x]   Arms, Elbows, and Hands   [x]   Coccyx, Sacrum, and Ischium  [x]   Legs, Feet, and Heels        Skin Assessed Under all Medical Devices by both nurses:  O2 device tubing              All Mepilex Borders were peeled back and area peeked at by both nurses:  Yes  Please list where Mepilex Borders are located:  coccyx             **SHARE this note so that the co-signing nurse is able to place an eSignature**    Co-signer eSignature: Electronically signed by Dianelys Pugh RN on 3/31/22 at 7:24 AM EDT    Does the Patient have Skin Breakdown related to pressure? Blanchable redness to coccyx, elbows, and heels. Mipilex placed on coccyx. Also has scattered bruising. Scattered abraisions to BLE. Skin fragile and dry, small tears to upper arms.              Trey Prevention initiated:  Yes   Wound Care Orders initiated:  NA      WOC nurse consulted for Pressure Injury (Stage 3,4, Unstageable, DTI, NWPT, Complex wounds)and New or Established Ostomies:  NA      Primary Nurse eSignature: Electronically signed by Drea Benitez RN on 3/31/22 at 3:45 AM EDT

## 2022-03-31 NOTE — PROGRESS NOTES
Home medication reconciliation verified with Laura Mohan who helps patient with medications at home. Dr. Natalia Escamilla notified, see new orders. BiPAP order received.

## 2022-03-31 NOTE — PROGRESS NOTES
03/30/22 9538   NIV Type   $NIV $Daily Charge   Skin Assessment Clean, dry, & intact   Skin Protection for O2 Device Yes   Location Nose   NIV Started/Stopped On   Equipment Type V60   Mode Bilevel   Mask Type Full face mask   Mask Size Small   Settings/Measurements   IPAP 14 cmH20   CPAP/EPAP 7 cmH2O   Rate Ordered 12   Resp 23   Insp Rise Time (%) 2 %   FiO2  40 %   I Time/ I Time % 1.6 s   Vt Exhaled 575 mL   Minute Volume 16 Liters   Mask Leak (lpm) 9 lpm   Comfort Level Good   Using Accessory Muscles No   SpO2 98

## 2022-03-31 NOTE — ACP (ADVANCE CARE PLANNING)
Advance Care Planning     General Advance Care Planning (ACP) Conversation    Date of Conversation: 3/30/2022  Conducted with: Patient with Decision Making Capacity; confirmed with RN pt is alert and oriented    Healthcare Decision Maker:    Primary Decision Maker: Michael - Edgar - [phone]    Secondary Decision Maker: Hafsa Tapia - Niece/Nephew - [phone]  Click here to complete Healthcare Decision Makers including selection of the Healthcare Decision Maker Relationship (ie \"Primary\"). Today we documented Decision Maker(s) consistent with Legal Next of Kin hierarchy. Content/Action Overview:    Discussed Full Code and what this means (CPR/Vent if medically necessary).  Pt confirmed she wants a Full Code    Length of Voluntary ACP Conversation in minutes:  <16 minutes (Non-Billable)    Angel CARO, SANTO

## 2022-03-31 NOTE — CARE COORDINATION
[facility]  Received referral regarding [facility]. services from [address]. Pt is active with [facility] with SN, PT/OT. Notified [facility] of admit. Services are on HOLD. Liaison will follow for [facility], [facility]. needs until dc.       Electronically signed by Jose Carlos Jackson RN on 3/31/2022 at 11:40 AM

## 2022-04-01 ENCOUNTER — APPOINTMENT (OUTPATIENT)
Dept: CT IMAGING | Age: 82
DRG: 871 | End: 2022-04-01
Payer: MEDICARE

## 2022-04-01 LAB
ANION GAP SERPL CALCULATED.3IONS-SCNC: 13 MMOL/L (ref 3–16)
BUN BLDV-MCNC: 35 MG/DL (ref 7–20)
CALCIUM SERPL-MCNC: 8.8 MG/DL (ref 8.3–10.6)
CHLORIDE BLD-SCNC: 111 MMOL/L (ref 99–110)
CO2: 20 MMOL/L (ref 21–32)
CREAT SERPL-MCNC: 1.6 MG/DL (ref 0.6–1.2)
GFR AFRICAN AMERICAN: 37
GFR NON-AFRICAN AMERICAN: 31
GLUCOSE BLD-MCNC: 119 MG/DL (ref 70–99)
GLUCOSE BLD-MCNC: 143 MG/DL (ref 70–99)
GLUCOSE BLD-MCNC: 171 MG/DL (ref 70–99)
GLUCOSE BLD-MCNC: 97 MG/DL (ref 70–99)
HCT VFR BLD CALC: 30.1 % (ref 36–48)
HEMOGLOBIN: 9.6 G/DL (ref 12–16)
MCH RBC QN AUTO: 29.7 PG (ref 26–34)
MCHC RBC AUTO-ENTMCNC: 32.1 G/DL (ref 31–36)
MCV RBC AUTO: 92.6 FL (ref 80–100)
PDW BLD-RTO: 13.4 % (ref 12.4–15.4)
PERFORMED ON: ABNORMAL
PERFORMED ON: ABNORMAL
PERFORMED ON: NORMAL
PLATELET # BLD: 246 K/UL (ref 135–450)
PMV BLD AUTO: 7.5 FL (ref 5–10.5)
POTASSIUM SERPL-SCNC: 3.2 MMOL/L (ref 3.5–5.1)
RBC # BLD: 3.25 M/UL (ref 4–5.2)
SODIUM BLD-SCNC: 144 MMOL/L (ref 136–145)
WBC # BLD: 28.6 K/UL (ref 4–11)

## 2022-04-01 PROCEDURE — 6360000002 HC RX W HCPCS: Performed by: INTERNAL MEDICINE

## 2022-04-01 PROCEDURE — 6370000000 HC RX 637 (ALT 250 FOR IP): Performed by: INTERNAL MEDICINE

## 2022-04-01 PROCEDURE — 6370000000 HC RX 637 (ALT 250 FOR IP): Performed by: NURSE PRACTITIONER

## 2022-04-01 PROCEDURE — 2000000000 HC ICU R&B

## 2022-04-01 PROCEDURE — 80048 BASIC METABOLIC PNL TOTAL CA: CPT

## 2022-04-01 PROCEDURE — 74176 CT ABD & PELVIS W/O CONTRAST: CPT

## 2022-04-01 PROCEDURE — 99233 SBSQ HOSP IP/OBS HIGH 50: CPT | Performed by: INTERNAL MEDICINE

## 2022-04-01 PROCEDURE — 2700000000 HC OXYGEN THERAPY PER DAY

## 2022-04-01 PROCEDURE — 2580000003 HC RX 258: Performed by: INTERNAL MEDICINE

## 2022-04-01 PROCEDURE — 85027 COMPLETE CBC AUTOMATED: CPT

## 2022-04-01 PROCEDURE — 94660 CPAP INITIATION&MGMT: CPT

## 2022-04-01 PROCEDURE — 2580000003 HC RX 258: Performed by: NURSE PRACTITIONER

## 2022-04-01 PROCEDURE — 94640 AIRWAY INHALATION TREATMENT: CPT

## 2022-04-01 PROCEDURE — 94761 N-INVAS EAR/PLS OXIMETRY MLT: CPT

## 2022-04-01 PROCEDURE — 6360000002 HC RX W HCPCS: Performed by: NURSE PRACTITIONER

## 2022-04-01 RX ORDER — FLUTICASONE PROPIONATE 50 MCG
1 SPRAY, SUSPENSION (ML) NASAL DAILY
Status: DISCONTINUED | OUTPATIENT
Start: 2022-04-02 | End: 2022-04-09 | Stop reason: HOSPADM

## 2022-04-01 RX ORDER — POTASSIUM CHLORIDE 7.45 MG/ML
10 INJECTION INTRAVENOUS
Status: COMPLETED | OUTPATIENT
Start: 2022-04-01 | End: 2022-04-01

## 2022-04-01 RX ORDER — BUSPIRONE HYDROCHLORIDE 10 MG/1
10 TABLET ORAL 3 TIMES DAILY
Status: DISCONTINUED | OUTPATIENT
Start: 2022-04-01 | End: 2022-04-02

## 2022-04-01 RX ADMIN — FERROUS SULFATE TAB 325 MG (65 MG ELEMENTAL FE) 325 MG: 325 (65 FE) TAB at 08:36

## 2022-04-01 RX ADMIN — POTASSIUM CHLORIDE 10 MEQ: 7.46 INJECTION, SOLUTION INTRAVENOUS at 11:34

## 2022-04-01 RX ADMIN — POTASSIUM CHLORIDE 10 MEQ: 7.46 INJECTION, SOLUTION INTRAVENOUS at 13:09

## 2022-04-01 RX ADMIN — PREDNISONE 5 MG: 5 TABLET ORAL at 08:36

## 2022-04-01 RX ADMIN — BUSPIRONE HYDROCHLORIDE 10 MG: 10 TABLET ORAL at 10:32

## 2022-04-01 RX ADMIN — BUSPIRONE HYDROCHLORIDE 10 MG: 10 TABLET ORAL at 16:19

## 2022-04-01 RX ADMIN — SODIUM CHLORIDE: 9 INJECTION, SOLUTION INTRAVENOUS at 02:07

## 2022-04-01 RX ADMIN — CLOPIDOGREL BISULFATE 75 MG: 75 TABLET ORAL at 08:36

## 2022-04-01 RX ADMIN — BUSPIRONE HYDROCHLORIDE 10 MG: 10 TABLET ORAL at 20:20

## 2022-04-01 RX ADMIN — IPRATROPIUM BROMIDE AND ALBUTEROL SULFATE 3 ML: .5; 3 SOLUTION RESPIRATORY (INHALATION) at 19:28

## 2022-04-01 RX ADMIN — SODIUM CHLORIDE, PRESERVATIVE FREE 10 ML: 5 INJECTION INTRAVENOUS at 20:09

## 2022-04-01 RX ADMIN — HEPARIN SODIUM 5000 UNITS: 5000 INJECTION INTRAVENOUS; SUBCUTANEOUS at 08:37

## 2022-04-01 RX ADMIN — IPRATROPIUM BROMIDE AND ALBUTEROL SULFATE 3 ML: .5; 3 SOLUTION RESPIRATORY (INHALATION) at 08:16

## 2022-04-01 RX ADMIN — ACETAMINOPHEN 650 MG: 325 TABLET ORAL at 02:09

## 2022-04-01 RX ADMIN — SODIUM CHLORIDE: 9 INJECTION, SOLUTION INTRAVENOUS at 11:31

## 2022-04-01 RX ADMIN — SERTRALINE HYDROCHLORIDE 50 MG: 50 TABLET ORAL at 08:36

## 2022-04-01 RX ADMIN — Medication 250 MG: at 06:54

## 2022-04-01 RX ADMIN — IPRATROPIUM BROMIDE AND ALBUTEROL SULFATE 3 ML: .5; 3 SOLUTION RESPIRATORY (INHALATION) at 15:15

## 2022-04-01 RX ADMIN — HEPARIN SODIUM 5000 UNITS: 5000 INJECTION INTRAVENOUS; SUBCUTANEOUS at 20:17

## 2022-04-01 RX ADMIN — CEFTRIAXONE SODIUM 1000 MG: 1 INJECTION, POWDER, FOR SOLUTION INTRAMUSCULAR; INTRAVENOUS at 11:33

## 2022-04-01 RX ADMIN — PANTOPRAZOLE SODIUM 40 MG: 40 TABLET, DELAYED RELEASE ORAL at 06:54

## 2022-04-01 RX ADMIN — INSULIN LISPRO 1 UNITS: 100 INJECTION, SOLUTION INTRAVENOUS; SUBCUTANEOUS at 08:41

## 2022-04-01 RX ADMIN — Medication 125 MG: at 18:27

## 2022-04-01 RX ADMIN — ATORVASTATIN CALCIUM 40 MG: 40 TABLET, FILM COATED ORAL at 20:20

## 2022-04-01 RX ADMIN — SODIUM CHLORIDE: 9 INJECTION, SOLUTION INTRAVENOUS at 20:06

## 2022-04-01 RX ADMIN — IPRATROPIUM BROMIDE AND ALBUTEROL SULFATE 3 ML: .5; 3 SOLUTION RESPIRATORY (INHALATION) at 11:37

## 2022-04-01 RX ADMIN — BUSPIRONE HYDROCHLORIDE 5 MG: 5 TABLET ORAL at 08:39

## 2022-04-01 RX ADMIN — Medication 125 MG: at 11:35

## 2022-04-01 RX ADMIN — ACETAMINOPHEN 650 MG: 325 TABLET ORAL at 16:19

## 2022-04-01 ASSESSMENT — PAIN SCALES - GENERAL
PAINLEVEL_OUTOF10: 0
PAINLEVEL_OUTOF10: 10
PAINLEVEL_OUTOF10: 0
PAINLEVEL_OUTOF10: 2

## 2022-04-01 ASSESSMENT — PAIN SCALES - WONG BAKER: WONGBAKER_NUMERICALRESPONSE: 0

## 2022-04-01 NOTE — CARE COORDINATION
INTERDISCIPLINARY PLAN OF CARE CONFERENCE    Date/Time: 4/1/2022 9:44 AM  Completed by: SANTO Parker   Case Management      Patient Name:  Simone Tsai  YOB: 1940  Admitting Diagnosis: Septicemia ([facility] [address] [A41.9]  Elevated troponin [R77.8]  NADJA (acute kidney injury) ([facility] [address] [N17.9]  Sepsis ([facility] [address] [A41.9]     Admit Date/Time:  3/30/2022  1:14 PM    Chart reviewed. Interdisciplinary team contacted or reviewed plan related to patient progress and discharge plans. Disciplines included Case Management, Nursing, and Dietitian. Current Status:Ongoing   PT/OT recommendation for discharge plan of care: TBD    Expected D/C Disposition:  Home  Confirmed plan with patient and/or family Yes confirmed with: (name) pt  Met with:pt    Discharge Plan Comments: Chart review completed. Completed Interdisciplinary rounds with ICU staff. Met with pt at bedside. Pt confirmed she is returning home with resumption of [address] [facility]). She denied needs for CM at this time. Pt will need re-certified for home o2 per conversation with Michelle Allan liaison yesterday (see initial note from [address])    CM will continue to follow and assist. Please notify CM if needs or concerns arise.     Home O2 in place on admit: Yes

## 2022-04-01 NOTE — FLOWSHEET NOTE
03/31/22 1933   Vital Signs   Temp 97.7 °F (36.5 °C)   Temp Source Oral   Pulse 93   Heart Rate Source Monitor   Resp 13   BP 86/60   BP Location Left lower arm   MAP (mmHg) 70   Level of Consciousness Alert (0)   MEWS Score 1   Oxygen Therapy   SpO2 100 %   O2 Flow Rate (L/min) 3 L/min   Patient assessment complete, levophed off, BiPAP just removed per patient request, oxygen 3 liters replaced per home dose, lung sounds clear, BS hyperactive, many watery bowel movements today, in contact plus precautions for recent C-diff infection. No edema noted. Multiple bruises and BLE scabbed areas, skin fragile. Mipilex to coccyx soiled and removed. Denies needs, feeling better, still having pain to left lower quadrant when rolling in bed but not constant. Requesting night medications. Call light in reach, will continue to monitor.

## 2022-04-01 NOTE — PROGRESS NOTES
Shift assessment completed as documented on flowsheet. VSS pt awake and alert, follows commands, very anxious at this time. Lungs diminished. ST on monitor. Generalized trace edema. Voids per bedpan. Abd soft and nontender. NS infusing per MAR. Call light within reach.

## 2022-04-01 NOTE — PROGRESS NOTES
03/31/22 2325   NIV Type   NIV Started/Stopped On   Equipment Type v60   Mode Bilevel   Mask Type Full face mask   Mask Size Small   Settings/Measurements   IPAP 14 cmH20   CPAP/EPAP 7 cmH2O   Rate Ordered 12   Resp 26   FiO2  40 %   Vt Exhaled 479 mL   Minute Volume 12.1 Liters   Mask Leak (lpm) 4 lpm   Comfort Level Good   Using Accessory Muscles No   SpO2 97   Breath Sounds   Right Upper Lobe Diminished   Right Middle Lobe Diminished   Right Lower Lobe Diminished   Left Upper Lobe Diminished   Left Lower Lobe Diminished   Alarm Settings   Alarms On Y   Press Low Alarm 8 cmH2O   High Pressure Alarm 40 cmH2O   Delay Alarm 20 sec(s)   Resp Rate Low Alarm 12   High Respiratory Rate 40 br/min   Oxygen Therapy/Pulse Ox   SpO2 100 %

## 2022-04-01 NOTE — PROGRESS NOTES
Awake, bed pan requested, patient voids, requests to come off bipap, wore for 3 hours well. Oxygen 3 liters replaced NC, TV on, patient given cup of coffee, MAP 89, SaO2 98%. C/O pain when turning and states had mild chest discomfort when returning to back, did not radiate, anxious when turning, tachypnea with turn. Discomfort quickly resolves, tylenol given per request.  Denies further needs, call light in hand.

## 2022-04-01 NOTE — PROGRESS NOTES
Labs drawn, patient on bedpan, BM x 3 this shift, remains watery. C/O pain when turning to right in RLQ, tylenol given. Lung sounds clear, VS stable, SR with PAC's on monitor. Call light in hand, Patient on BiPAP.

## 2022-04-01 NOTE — PROGRESS NOTES
No change in assessment at this time. Pt not having bowel movements but has been voiding in bed and then asked to be changed. Pt requested bipap on at this time.

## 2022-04-01 NOTE — PROGRESS NOTES
AM rounds with Dr Pam Landon, discussed nonsystematic 7beat run of Vtach with 's. Reviewed with Dr Felicia Navarro and updated Dr Pam Landon on Felicia Navarro recommendations regarding electrolyte  Replacement. Pt will also go for CT of abd and pelvis. Pt repositioned as needed. Call light within reach.

## 2022-04-01 NOTE — PROGRESS NOTES
times per day    heparin (porcine)  5,000 Units SubCUTAneous BID    busPIRone  5 mg Oral TID    pantoprazole  40 mg Oral Daily    predniSONE  5 mg Oral Daily with breakfast       Data:  CBC:   Recent Labs     03/30/22  1346 03/31/22  0530 04/01/22  0430   WBC 28.2* 38.4* 28.6*   HGB 11.6* 9.8* 9.6*   HCT 36.0 31.0* 30.1*   MCV 91.4 92.8 92.6    287 246     BMP:   Recent Labs     03/30/22  1346 03/31/22  0530 04/01/22  0430    141 144   K 3.6 3.1* 3.2*   CL 95* 106 111*   CO2 26 22 20*   BUN 35* 35* 35*   CREATININE 2.1* 2.2* 1.6*     LIVER PROFILE:   Recent Labs     03/30/22  1346 03/31/22  0530   AST 17 16   ALT 7* 6*   BILITOT 0.4 0.3   ALKPHOS 89 81       Microbiology:  3/30 UC E. Coli  3/30 BC E. Coli  3/30 COVID-19 not detected  3/31 C. difficile negative    Imaging:  Chest x-ray 3/30 imaging was reviewed by me and showed   Bilateral ill-defined airspace disease        Echo 8/29/2021  Normal left ventricular systolic function with ejection fraction of   55-60%. No regional wall motion abnormalites are seen.  Left ventricular   diastolic filling pressure is elevated.   Compared to previous study from 9-4-2019 no changes noted in left   ventricular function.   Mild mitral regurgitation        ASSESSMENT:  · Acute hypoxemic respiratory failure  · Septic shock  · E. coli bacteremia  · Acute kidney injury  · Electrolyte disorder  · UTI  · Abdominal pain -RLQ pain   · VT, elevated troponin  · History of history of C. difficile troponin  · COPD chronically on home O2 2.5 L and AVAPS- not in exacerbation   · Chronic anxiety with panic attacks  · CAD post CABG 2019        PLAN:  · Supplemental oxygen to maintain SaO2 >92%; wean as tolerated  · BiPAP QHS and PRN during the day   · Closely monitory airways, clinical status, cardiac rhythm, vital signs, urine output and NG output   · Inhaled bronchodilators  · Home dose 5 mg prednisone   · Maintenance IVF NS at 100 cc/hr  · Ceftriaxone D#2 and Vanc  mg PO QID D#2  · IV Levophed to keep MAP > 65 mmHg or SBP>90  · Follow up Cx   · Cardiology following   · CT abdomen/pelvis  · Blood sugar control, with goal 140-180  · DVT prophylaxis: Heparin SQ  · MRSA prophylaxis: Bactroban  · Code status: Full code   · Okay to move out of the ICU from our perspective

## 2022-04-01 NOTE — PROGRESS NOTES
Hospitalist Progress Note      PCP: Fito Taveras MD    Date of Admission: 3/30/2022    Chief Complaint: SOB from home via EMS. Hx of end stage COPD. Home o2 dependent - reports 2.5L baseline - increases to 3-4L when SOB     Recent treated for Cdiff diarrhea. Reports she finished Abx about a week ago - per home care notes Vanc was extended from 3/24-3/29 after the initial treatment and she finished it on 3/29. Pt reports foul smelling urine but denies dysuria. Subjective:     Feels SOB. 13 BM documented last 24hrs, but Cdiff actually negative.            Medications:  Reviewed    Infusion Medications    norepinephrine Stopped (03/31/22 1501)    sodium chloride      dextrose      sodium chloride      sodium chloride 100 mL/hr at 04/01/22 1131     Scheduled Medications    busPIRone  10 mg Oral TID    [START ON 4/2/2022] fluticasone  1 spray Each Nostril Daily    potassium chloride  10 mEq IntraVENous Q1H    vancomycin  125 mg Oral 4 times per day    cefTRIAXone (ROCEPHIN) IV  1,000 mg IntraVENous Q24H    insulin lispro  0-6 Units SubCUTAneous TID WC    insulin lispro  0-3 Units SubCUTAneous Nightly    atorvastatin  40 mg Oral Nightly    ipratropium-albuterol  3 mL Inhalation 4x daily    clopidogrel  75 mg Oral Daily    ferrous sulfate  325 mg Oral Daily with breakfast    sertraline  50 mg Oral Daily    sodium chloride flush  5-40 mL IntraVENous 2 times per day    heparin (porcine)  5,000 Units SubCUTAneous BID    pantoprazole  40 mg Oral Daily    predniSONE  5 mg Oral Daily with breakfast     PRN Meds: sodium chloride, glucose, glucagon (rDNA), dextrose, dextrose bolus (hypoglycemia) **OR** dextrose bolus (hypoglycemia), ipratropium-albuterol, sodium chloride flush, sodium chloride, ondansetron **OR** ondansetron, polyethylene glycol, acetaminophen **OR** acetaminophen      Intake/Output Summary (Last 24 hours) at 4/1/2022 1219  Last data filed at 4/1/2022 9853  Gross per 24 hour   Intake 2036.92 ml   Output 150 ml   Net 1886.92 ml       Physical Exam Performed:    BP (!) 153/79   Pulse 122   Temp 97.5 °F (36.4 °C) (Oral)   Resp 26   Ht 5' 4\" (1.626 m)   Wt 120 lb (54.4 kg)   SpO2 100%   BMI 20.60 kg/m²     General appearance: No apparent distress, appears stated age and cooperative. HEENT: Pupils equal, round, and reactive to light. Conjunctivae/corneas clear. Neck: Supple, with full range of motion. No jugular venous distention. Trachea midline. Respiratory:  Normal respiratory effort. Clear to auscultation, bilaterally without Rales/Wheezes/Rhonchi. Cardiovascular: Regular rate and rhythm with normal S1/S2 without murmurs, rubs or gallops. Abdomen: Soft, non-tender, non-distended with normal bowel sounds. Musculoskeletal: No clubbing, cyanosis or edema bilaterally. Full range of motion without deformity. Skin: Skin color, texture, turgor normal.  No rashes or lesions. Neurologic:  Neurovascularly intact without any focal sensory/motor deficits. Cranial nerves: II-XII intact, grossly non-focal.  Psychiatric: Alert and oriented, thought content appropriate, normal insight  Capillary Refill: Brisk,3 seconds, normal   Peripheral Pulses: +2 palpable, equal bilaterally       Labs:   Recent Labs     03/30/22 1346 03/31/22  0530 04/01/22  0430   WBC 28.2* 38.4* 28.6*   HGB 11.6* 9.8* 9.6*   HCT 36.0 31.0* 30.1*    287 246     Recent Labs     03/30/22  1346 03/31/22  0530 04/01/22  0430    141 144   K 3.6 3.1* 3.2*   CL 95* 106 111*   CO2 26 22 20*   BUN 35* 35* 35*   CREATININE 2.1* 2.2* 1.6*   CALCIUM 9.5 8.2* 8.8     Recent Labs     03/30/22  1346 03/31/22  0530   AST 17 16   ALT 7* 6*   BILITOT 0.4 0.3   ALKPHOS 89 81     No results for input(s): INR in the last 72 hours.   Recent Labs     03/30/22 2129 03/31/22  0307 03/31/22  0930   TROPONINI 0.33* 0.23* 0.14*       Urinalysis:      Lab Results   Component Value Date    NITRU POSITIVE 03/30/2022 WBCUA  03/30/2022    BACTERIA 3+ 03/30/2022    RBCUA 3-4 03/30/2022    BLOODU MODERATE 03/30/2022    SPECGRAV 1.015 03/30/2022    GLUCOSEU Negative 03/30/2022       Radiology:  CT ABDOMEN PELVIS WO CONTRAST Additional Contrast? None   Final Result   Mild body wall anasarca, small pleural effusions, and small amount of   abdominal and pelvic ascites, compatible with fluid overload      Scattered areas of colonic wall thickening are seen, either due to the   partially contracted state of the colon or wall thickening from underlying   colitis      Posterior fundal fibroid versus thickening of the endometrial stripe. Recommend follow-up non urgent pelvic ultrasound for further characterization. XR CHEST PORTABLE   Final Result   Interval placement of a left IJ approach central venous catheter with the tip   overlying the mid SVC, with no pneumothorax identified. XR CHEST PORTABLE   Final Result   Bilateral ill-defined linear pulmonary opacities could represent pulmonary   edema or infection                 Assessment/Plan:    Active Hospital Problems    Diagnosis     Tachycardia [R00.0]     Acute hypoxemic respiratory failure (HCC) [J96.01]     Septic shock (HCC) [A41.9, R65.21]     Bacteremia [R78.81]     Urinary tract infection without hematuria [N39.0]     Sepsis ([address] [A41.9]     S/P CABG x 2 [Z95.1]     Elevated troponin [R77.8]        Sepsis with severe sepsis and septic shock. Diarrhea - recent Cdiff infection. Bacteremia - Ecoli on blood cultures  And also on urine culture from 3/30  IV rocephin   Empiric PO vanc - will need to be on this while on other Abx and for at least 10 days after  off levophed        COPD with AE. End stage COPD. Acute on chronic hypoxic resp failure. Mood disorder on zoloft. CAD  On plavix. BB off due to septic shock. Statin on hold. Hx of GI bleed. Cont PPI. Monitor Hgb.        Mod to severe protein calorie malnutrition. Supplement as tolerates. DVT Prophylaxis: heparin  Diet: ADULT DIET; Regular; Low Potassium (Less than 3000 mg/day);  Low Phosphorus (Less than 1000 mg)  Code Status: Full Code      Dispo - icu    Rosa Tate MD

## 2022-04-01 NOTE — PROGRESS NOTES
Patient reassessment complete, bathed, linens changed, tolerated fair, still having pain to RLQ when turning, stated it radiated to back but quits when turns back to supine position. Patient describes it as a pressure. Large amount of flatus noted. Two diarrheas this shift so far, watery with mucous noted, on oral vancomycin. Urine culture came back with MDRO, new order for Merrem started. Will continue to monitor, VS stable. RT in to place BiPAP. New mipilex placed to coccyx.

## 2022-04-01 NOTE — PROGRESS NOTES
RT Inhaler-Nebulizer Bronchodilator Protocol Note    There is a bronchodilator order in the chart from a provider indicating to follow the RT Bronchodilator Protocol and there is an Initiate RT Inhaler-Nebulizer Bronchodilator Protocol order as well (see protocol at bottom of note). CXR Findings:  XR CHEST PORTABLE    Result Date: 3/31/2022  Interval placement of a left IJ approach central venous catheter with the tip overlying the mid SVC, with no pneumothorax identified. XR CHEST PORTABLE    Result Date: 3/30/2022  Bilateral ill-defined linear pulmonary opacities could represent pulmonary edema or infection       The findings from the last RT Protocol Assessment were as follows:   History Pulmonary Disease: Chronic pulmonary disease  Respiratory Pattern: Mild dyspnea at rest, irregular pattern, or RR 21-25 bpm  Breath Sounds: Slightly diminished and/or crackles  Cough: Strong, spontaneous, non-productive  Indication for Bronchodilator Therapy: On home bronchodilators  Bronchodilator Assessment Score: 8    Aerosolized bronchodilator medication orders have been revised according to the RT Inhaler-Nebulizer Bronchodilator Protocol below. Respiratory Therapist to perform RT Therapy Protocol Assessment initially then follow the protocol. Repeat RT Therapy Protocol Assessment PRN for score 0-3 or on second treatment, BID, and PRN for scores above 3. No Indications - adjust the frequency to every 6 hours PRN wheezing or bronchospasm, if no treatments needed after 48 hours then discontinue using Per Protocol order mode. If indication present, adjust the RT bronchodilator orders based on the Bronchodilator Assessment Score as indicated below.   Use Inhaler orders unless patient has one or more of the following: on home nebulizer, not able to hold breath for 10 seconds, is not alert and oriented, cannot activate and use MDI correctly, or respiratory rate 25 breaths per minute or more, then use the equivalent nebulizer order(s) with same Frequency and PRN reasons based on the score. If a patient is on this medication at home then do not decrease Frequency below that used at home. 0-3 - enter or revise RT bronchodilator order(s) to equivalent RT Bronchodilator order with Frequency of every 4 hours PRN for wheezing or increased work of breathing using Per Protocol order mode. 4-6 - enter or revise RT Bronchodilator order(s) to two equivalent RT bronchodilator orders with one order with BID Frequency and one order with Frequency of every 4 hours PRN wheezing or increased work of breathing using Per Protocol order mode. 7-10 - enter or revise RT Bronchodilator order(s) to two equivalent RT bronchodilator orders with one order with TID Frequency and one order with Frequency of every 4 hours PRN wheezing or increased work of breathing using Per Protocol order mode. 11-13 - enter or revise RT Bronchodilator order(s) to one equivalent RT bronchodilator order with QID Frequency and an Albuterol order with Frequency of every 4 hours PRN wheezing or increased work of breathing using Per Protocol order mode. Greater than 13 - enter or revise RT Bronchodilator order(s) to one equivalent RT bronchodilator order with every 4 hours Frequency and an Albuterol order with Frequency of every 2 hours PRN wheezing or increased work of breathing using Per Protocol order mode.        Electronically signed by Joshua Reed RCP on 3/31/2022 at 11:14 PM

## 2022-04-02 ENCOUNTER — APPOINTMENT (OUTPATIENT)
Dept: GENERAL RADIOLOGY | Age: 82
DRG: 871 | End: 2022-04-02
Payer: MEDICARE

## 2022-04-02 LAB
ANION GAP SERPL CALCULATED.3IONS-SCNC: 9 MMOL/L (ref 3–16)
ANTI-XA UNFRAC HEPARIN: 0.18 IU/ML (ref 0.3–0.7)
ANTI-XA UNFRAC HEPARIN: 0.38 IU/ML (ref 0.3–0.7)
BASE EXCESS ARTERIAL: -12.1 MMOL/L (ref -3–3)
BASE EXCESS ARTERIAL: -9.7 MMOL/L (ref -3–3)
BASOPHILS ABSOLUTE: 0 K/UL (ref 0–0.2)
BASOPHILS RELATIVE PERCENT: 0.1 %
BLOOD CULTURE, ROUTINE: ABNORMAL
BLOOD CULTURE, ROUTINE: ABNORMAL
BUN BLDV-MCNC: 29 MG/DL (ref 7–20)
CALCIUM SERPL-MCNC: 8.8 MG/DL (ref 8.3–10.6)
CARBOXYHEMOGLOBIN ARTERIAL: 0.1 % (ref 0–1.5)
CARBOXYHEMOGLOBIN ARTERIAL: 0.3 % (ref 0–1.5)
CHLORIDE BLD-SCNC: 115 MMOL/L (ref 99–110)
CO2: 20 MMOL/L (ref 21–32)
CREAT SERPL-MCNC: 1.4 MG/DL (ref 0.6–1.2)
CULTURE, BLOOD 2: ABNORMAL
EKG ATRIAL RATE: 182 BPM
EKG DIAGNOSIS: NORMAL
EKG Q-T INTERVAL: 290 MS
EKG QRS DURATION: 84 MS
EKG QTC CALCULATION (BAZETT): 502 MS
EKG R AXIS: -65 DEGREES
EKG T AXIS: 91 DEGREES
EKG VENTRICULAR RATE: 180 BPM
EOSINOPHILS ABSOLUTE: 0 K/UL (ref 0–0.6)
EOSINOPHILS RELATIVE PERCENT: 0.1 %
GFR AFRICAN AMERICAN: 44
GFR NON-AFRICAN AMERICAN: 36
GLUCOSE BLD-MCNC: 109 MG/DL (ref 70–99)
GLUCOSE BLD-MCNC: 166 MG/DL (ref 70–99)
GLUCOSE BLD-MCNC: 210 MG/DL (ref 70–99)
GLUCOSE BLD-MCNC: 92 MG/DL (ref 70–99)
GLUCOSE BLD-MCNC: 95 MG/DL (ref 70–99)
HCO3 ARTERIAL: 17.5 MMOL/L (ref 21–29)
HCO3 ARTERIAL: 18.3 MMOL/L (ref 21–29)
HCT VFR BLD CALC: 33.9 % (ref 36–48)
HEMOGLOBIN, ART, EXTENDED: 12 G/DL (ref 12–16)
HEMOGLOBIN, ART, EXTENDED: 12.2 G/DL (ref 12–16)
HEMOGLOBIN: 10.8 G/DL (ref 12–16)
LYMPHOCYTES ABSOLUTE: 0.5 K/UL (ref 1–5.1)
LYMPHOCYTES RELATIVE PERCENT: 1.6 %
MCH RBC QN AUTO: 30.1 PG (ref 26–34)
MCHC RBC AUTO-ENTMCNC: 31.9 G/DL (ref 31–36)
MCV RBC AUTO: 94.4 FL (ref 80–100)
METHEMOGLOBIN ARTERIAL: 0.1 %
METHEMOGLOBIN ARTERIAL: 0.1 %
MONOCYTES ABSOLUTE: 1.2 K/UL (ref 0–1.3)
MONOCYTES RELATIVE PERCENT: 4.2 %
NEUTROPHILS ABSOLUTE: 26.9 K/UL (ref 1.7–7.7)
NEUTROPHILS RELATIVE PERCENT: 94 %
O2 SAT, ARTERIAL: 86.9 %
O2 SAT, ARTERIAL: 95.7 %
O2 THERAPY: ABNORMAL
O2 THERAPY: ABNORMAL
ORGANISM: ABNORMAL
PCO2 ARTERIAL: 43.2 MMHG (ref 35–45)
PCO2 ARTERIAL: 63.5 MMHG (ref 35–45)
PDW BLD-RTO: 14.1 % (ref 12.4–15.4)
PERFORMED ON: ABNORMAL
PERFORMED ON: NORMAL
PH ARTERIAL: 7.08 (ref 7.35–7.45)
PH ARTERIAL: 7.22 (ref 7.35–7.45)
PLATELET # BLD: 280 K/UL (ref 135–450)
PMV BLD AUTO: 7.7 FL (ref 5–10.5)
PO2 ARTERIAL: 71.6 MMHG (ref 75–108)
PO2 ARTERIAL: 92.9 MMHG (ref 75–108)
POTASSIUM SERPL-SCNC: 3.7 MMOL/L (ref 3.5–5.1)
RBC # BLD: 3.59 M/UL (ref 4–5.2)
SODIUM BLD-SCNC: 144 MMOL/L (ref 136–145)
TCO2 ARTERIAL: 18.8 MMOL/L
TCO2 ARTERIAL: 20.3 MMOL/L
TROPONIN: 0.11 NG/ML
WBC # BLD: 28.6 K/UL (ref 4–11)

## 2022-04-02 PROCEDURE — 2000000000 HC ICU R&B

## 2022-04-02 PROCEDURE — 94660 CPAP INITIATION&MGMT: CPT

## 2022-04-02 PROCEDURE — 6360000002 HC RX W HCPCS: Performed by: INTERNAL MEDICINE

## 2022-04-02 PROCEDURE — 94640 AIRWAY INHALATION TREATMENT: CPT

## 2022-04-02 PROCEDURE — 85520 HEPARIN ASSAY: CPT

## 2022-04-02 PROCEDURE — 71045 X-RAY EXAM CHEST 1 VIEW: CPT

## 2022-04-02 PROCEDURE — 2500000003 HC RX 250 WO HCPCS: Performed by: INTERNAL MEDICINE

## 2022-04-02 PROCEDURE — 99291 CRITICAL CARE FIRST HOUR: CPT | Performed by: INTERNAL MEDICINE

## 2022-04-02 PROCEDURE — 2580000003 HC RX 258: Performed by: NURSE PRACTITIONER

## 2022-04-02 PROCEDURE — 93005 ELECTROCARDIOGRAM TRACING: CPT | Performed by: INTERNAL MEDICINE

## 2022-04-02 PROCEDURE — 2700000000 HC OXYGEN THERAPY PER DAY

## 2022-04-02 PROCEDURE — 6370000000 HC RX 637 (ALT 250 FOR IP): Performed by: NURSE PRACTITIONER

## 2022-04-02 PROCEDURE — 6370000000 HC RX 637 (ALT 250 FOR IP): Performed by: INTERNAL MEDICINE

## 2022-04-02 PROCEDURE — 84484 ASSAY OF TROPONIN QUANT: CPT

## 2022-04-02 PROCEDURE — 2580000003 HC RX 258: Performed by: INTERNAL MEDICINE

## 2022-04-02 PROCEDURE — C9113 INJ PANTOPRAZOLE SODIUM, VIA: HCPCS | Performed by: INTERNAL MEDICINE

## 2022-04-02 PROCEDURE — 0BH17EZ INSERTION OF ENDOTRACHEAL AIRWAY INTO TRACHEA, VIA NATURAL OR ARTIFICIAL OPENING: ICD-10-PCS | Performed by: INTERNAL MEDICINE

## 2022-04-02 PROCEDURE — 6360000002 HC RX W HCPCS

## 2022-04-02 PROCEDURE — 93010 ELECTROCARDIOGRAM REPORT: CPT | Performed by: INTERNAL MEDICINE

## 2022-04-02 PROCEDURE — 80048 BASIC METABOLIC PNL TOTAL CA: CPT

## 2022-04-02 PROCEDURE — 82803 BLOOD GASES ANY COMBINATION: CPT

## 2022-04-02 PROCEDURE — 85025 COMPLETE CBC W/AUTO DIFF WBC: CPT

## 2022-04-02 PROCEDURE — 31500 INSERT EMERGENCY AIRWAY: CPT | Performed by: INTERNAL MEDICINE

## 2022-04-02 PROCEDURE — 99233 SBSQ HOSP IP/OBS HIGH 50: CPT | Performed by: INTERNAL MEDICINE

## 2022-04-02 PROCEDURE — 94761 N-INVAS EAR/PLS OXIMETRY MLT: CPT

## 2022-04-02 PROCEDURE — 94002 VENT MGMT INPAT INIT DAY: CPT

## 2022-04-02 PROCEDURE — 5A1945Z RESPIRATORY VENTILATION, 24-96 CONSECUTIVE HOURS: ICD-10-PCS | Performed by: INTERNAL MEDICINE

## 2022-04-02 RX ORDER — HEPARIN SODIUM 1000 [USP'U]/ML
1700 INJECTION, SOLUTION INTRAVENOUS; SUBCUTANEOUS ONCE
Status: COMPLETED | OUTPATIENT
Start: 2022-04-03 | End: 2022-04-03

## 2022-04-02 RX ORDER — FENTANYL CITRATE 50 UG/ML
25 INJECTION, SOLUTION INTRAMUSCULAR; INTRAVENOUS
Status: DISCONTINUED | OUTPATIENT
Start: 2022-04-02 | End: 2022-04-04

## 2022-04-02 RX ORDER — LEVALBUTEROL 1.25 MG/.5ML
1.25 SOLUTION, CONCENTRATE RESPIRATORY (INHALATION) 4 TIMES DAILY
Status: DISCONTINUED | OUTPATIENT
Start: 2022-04-02 | End: 2022-04-09 | Stop reason: HOSPADM

## 2022-04-02 RX ORDER — LORAZEPAM 2 MG/ML
0.5 INJECTION INTRAMUSCULAR ONCE
Status: COMPLETED | OUTPATIENT
Start: 2022-04-02 | End: 2022-04-02

## 2022-04-02 RX ORDER — METOPROLOL TARTRATE 5 MG/5ML
5 INJECTION INTRAVENOUS ONCE
Status: COMPLETED | OUTPATIENT
Start: 2022-04-02 | End: 2022-04-02

## 2022-04-02 RX ORDER — PROPOFOL 10 MG/ML
5-50 INJECTION, EMULSION INTRAVENOUS CONTINUOUS
Status: DISCONTINUED | OUTPATIENT
Start: 2022-04-02 | End: 2022-04-04

## 2022-04-02 RX ORDER — HEPARIN SODIUM 1000 [USP'U]/ML
1700 INJECTION, SOLUTION INTRAVENOUS; SUBCUTANEOUS PRN
Status: DISCONTINUED | OUTPATIENT
Start: 2022-04-02 | End: 2022-04-07

## 2022-04-02 RX ORDER — METHYLPREDNISOLONE SODIUM SUCCINATE 40 MG/ML
40 INJECTION, POWDER, LYOPHILIZED, FOR SOLUTION INTRAMUSCULAR; INTRAVENOUS EVERY 8 HOURS
Status: DISCONTINUED | OUTPATIENT
Start: 2022-04-02 | End: 2022-04-05

## 2022-04-02 RX ORDER — METHYLPREDNISOLONE SODIUM SUCCINATE 125 MG/2ML
INJECTION, POWDER, LYOPHILIZED, FOR SOLUTION INTRAMUSCULAR; INTRAVENOUS
Status: DISPENSED
Start: 2022-04-02 | End: 2022-04-02

## 2022-04-02 RX ORDER — PANTOPRAZOLE SODIUM 40 MG/10ML
40 INJECTION, POWDER, LYOPHILIZED, FOR SOLUTION INTRAVENOUS ONCE
Status: COMPLETED | OUTPATIENT
Start: 2022-04-02 | End: 2022-04-02

## 2022-04-02 RX ORDER — HEPARIN SODIUM 1000 [USP'U]/ML
3500 INJECTION, SOLUTION INTRAVENOUS; SUBCUTANEOUS PRN
Status: DISCONTINUED | OUTPATIENT
Start: 2022-04-02 | End: 2022-04-07

## 2022-04-02 RX ORDER — LEVALBUTEROL INHALATION SOLUTION 1.25 MG/3ML
1.25 SOLUTION RESPIRATORY (INHALATION) 4 TIMES DAILY
Status: DISCONTINUED | OUTPATIENT
Start: 2022-04-02 | End: 2022-04-02 | Stop reason: CLARIF

## 2022-04-02 RX ORDER — DILTIAZEM HYDROCHLORIDE 5 MG/ML
10 INJECTION INTRAVENOUS ONCE
Status: COMPLETED | OUTPATIENT
Start: 2022-04-02 | End: 2022-04-02

## 2022-04-02 RX ORDER — QUETIAPINE FUMARATE 25 MG/1
25 TABLET, FILM COATED ORAL 2 TIMES DAILY
Status: DISCONTINUED | OUTPATIENT
Start: 2022-04-02 | End: 2022-04-09 | Stop reason: HOSPADM

## 2022-04-02 RX ORDER — PROPOFOL 10 MG/ML
INJECTION, EMULSION INTRAVENOUS
Status: COMPLETED
Start: 2022-04-02 | End: 2022-04-02

## 2022-04-02 RX ORDER — LEVALBUTEROL 1.25 MG/.5ML
1.25 SOLUTION, CONCENTRATE RESPIRATORY (INHALATION) EVERY 4 HOURS PRN
Status: DISCONTINUED | OUTPATIENT
Start: 2022-04-02 | End: 2022-04-09 | Stop reason: HOSPADM

## 2022-04-02 RX ORDER — METHYLPREDNISOLONE SODIUM SUCCINATE 40 MG/ML
40 INJECTION, POWDER, LYOPHILIZED, FOR SOLUTION INTRAMUSCULAR; INTRAVENOUS EVERY 8 HOURS
Status: DISCONTINUED | OUTPATIENT
Start: 2022-04-02 | End: 2022-04-02

## 2022-04-02 RX ORDER — HEPARIN SODIUM 1000 [USP'U]/ML
3500 INJECTION, SOLUTION INTRAVENOUS; SUBCUTANEOUS ONCE
Status: COMPLETED | OUTPATIENT
Start: 2022-04-02 | End: 2022-04-02

## 2022-04-02 RX ORDER — MIDAZOLAM HYDROCHLORIDE 1 MG/ML
2 INJECTION INTRAMUSCULAR; INTRAVENOUS
Status: DISCONTINUED | OUTPATIENT
Start: 2022-04-02 | End: 2022-04-04

## 2022-04-02 RX ORDER — METHYLPREDNISOLONE SODIUM SUCCINATE 125 MG/2ML
125 INJECTION, POWDER, LYOPHILIZED, FOR SOLUTION INTRAMUSCULAR; INTRAVENOUS ONCE
Status: COMPLETED | OUTPATIENT
Start: 2022-04-02 | End: 2022-04-02

## 2022-04-02 RX ORDER — HEPARIN SODIUM 10000 [USP'U]/100ML
1060 INJECTION, SOLUTION INTRAVENOUS CONTINUOUS
Status: DISCONTINUED | OUTPATIENT
Start: 2022-04-03 | End: 2022-04-07

## 2022-04-02 RX ADMIN — LORAZEPAM 0.5 MG: 2 INJECTION INTRAMUSCULAR; INTRAVENOUS at 06:25

## 2022-04-02 RX ADMIN — CLOPIDOGREL BISULFATE 75 MG: 75 TABLET ORAL at 09:54

## 2022-04-02 RX ADMIN — HEPARIN SODIUM 700 UNITS/HR: 10000 INJECTION, SOLUTION INTRAVENOUS; SUBCUTANEOUS at 10:13

## 2022-04-02 RX ADMIN — PREDNISONE 5 MG: 5 TABLET ORAL at 09:55

## 2022-04-02 RX ADMIN — Medication 125 MG: at 16:33

## 2022-04-02 RX ADMIN — SODIUM BICARBONATE 50 MEQ: 84 INJECTION, SOLUTION INTRAVENOUS at 11:14

## 2022-04-02 RX ADMIN — SODIUM CHLORIDE: 9 INJECTION, SOLUTION INTRAVENOUS at 05:19

## 2022-04-02 RX ADMIN — PIPERACILLIN AND TAZOBACTAM 3375 MG: 3; .375 INJECTION, POWDER, FOR SOLUTION INTRAVENOUS at 20:25

## 2022-04-02 RX ADMIN — FLUTICASONE PROPIONATE 1 SPRAY: 50 SPRAY, METERED NASAL at 09:55

## 2022-04-02 RX ADMIN — LEVALBUTEROL 1.25 MG: 1.25 SOLUTION, CONCENTRATE RESPIRATORY (INHALATION) at 19:34

## 2022-04-02 RX ADMIN — INSULIN LISPRO 1 UNITS: 100 INJECTION, SOLUTION INTRAVENOUS; SUBCUTANEOUS at 17:58

## 2022-04-02 RX ADMIN — SODIUM BICARBONATE: 84 INJECTION, SOLUTION INTRAVENOUS at 21:18

## 2022-04-02 RX ADMIN — MIDAZOLAM HYDROCHLORIDE 2 MG: 1 INJECTION, SOLUTION INTRAMUSCULAR; INTRAVENOUS at 23:31

## 2022-04-02 RX ADMIN — METOPROLOL TARTRATE 2.5 MG: 1 INJECTION, SOLUTION INTRAVENOUS at 06:23

## 2022-04-02 RX ADMIN — FERROUS SULFATE TAB 325 MG (65 MG ELEMENTAL FE) 325 MG: 325 (65 FE) TAB at 09:55

## 2022-04-02 RX ADMIN — PROPOFOL 1000 MG: 10 INJECTION, EMULSION INTRAVENOUS at 12:23

## 2022-04-02 RX ADMIN — Medication 125 MG: at 13:24

## 2022-04-02 RX ADMIN — HEPARIN SODIUM 3500 UNITS: 1000 INJECTION INTRAVENOUS; SUBCUTANEOUS at 10:43

## 2022-04-02 RX ADMIN — NOREPINEPHRINE BITARTRATE 5 MCG/MIN: 1 INJECTION INTRAVENOUS at 12:57

## 2022-04-02 RX ADMIN — PANTOPRAZOLE SODIUM 40 MG: 40 INJECTION, POWDER, FOR SOLUTION INTRAVENOUS at 09:55

## 2022-04-02 RX ADMIN — DILTIAZEM HYDROCHLORIDE 10 MG: 5 INJECTION INTRAVENOUS at 10:41

## 2022-04-02 RX ADMIN — SODIUM CHLORIDE, PRESERVATIVE FREE 10 ML: 5 INJECTION INTRAVENOUS at 09:57

## 2022-04-02 RX ADMIN — ATORVASTATIN CALCIUM 40 MG: 40 TABLET, FILM COATED ORAL at 23:05

## 2022-04-02 RX ADMIN — QUETIAPINE FUMARATE 25 MG: 25 TABLET ORAL at 09:55

## 2022-04-02 RX ADMIN — SALINE NASAL SPRAY 1 SPRAY: 1.5 SOLUTION NASAL at 05:28

## 2022-04-02 RX ADMIN — Medication 125 MG: at 05:24

## 2022-04-02 RX ADMIN — LEVALBUTEROL 1.25 MG: 1.25 SOLUTION, CONCENTRATE RESPIRATORY (INHALATION) at 16:07

## 2022-04-02 RX ADMIN — SERTRALINE HYDROCHLORIDE 50 MG: 50 TABLET ORAL at 09:55

## 2022-04-02 RX ADMIN — METHYLPREDNISOLONE SODIUM SUCCINATE 40 MG: 40 INJECTION, POWDER, FOR SOLUTION INTRAMUSCULAR; INTRAVENOUS at 18:45

## 2022-04-02 RX ADMIN — CEFTRIAXONE SODIUM 1000 MG: 1 INJECTION, POWDER, FOR SOLUTION INTRAMUSCULAR; INTRAVENOUS at 09:59

## 2022-04-02 RX ADMIN — SALINE NASAL SPRAY 1 SPRAY: 1.5 SOLUTION NASAL at 02:20

## 2022-04-02 RX ADMIN — QUETIAPINE FUMARATE 25 MG: 25 TABLET ORAL at 23:05

## 2022-04-02 RX ADMIN — ACETAMINOPHEN 650 MG: 325 TABLET ORAL at 00:19

## 2022-04-02 RX ADMIN — LEVALBUTEROL 1.25 MG: 1.25 SOLUTION RESPIRATORY (INHALATION) at 11:24

## 2022-04-02 RX ADMIN — Medication 125 MG: at 01:12

## 2022-04-02 RX ADMIN — SODIUM CHLORIDE, PRESERVATIVE FREE 10 ML: 5 INJECTION INTRAVENOUS at 23:06

## 2022-04-02 RX ADMIN — SODIUM BICARBONATE: 84 INJECTION, SOLUTION INTRAVENOUS at 12:19

## 2022-04-02 RX ADMIN — METHYLPREDNISOLONE SODIUM SUCCINATE 125 MG: 125 INJECTION, POWDER, FOR SOLUTION INTRAMUSCULAR; INTRAVENOUS at 11:30

## 2022-04-02 RX ADMIN — METOPROLOL TARTRATE 25 MG: 25 TABLET, FILM COATED ORAL at 12:57

## 2022-04-02 RX ADMIN — PIPERACILLIN AND TAZOBACTAM 3375 MG: 3; .375 INJECTION, POWDER, FOR SOLUTION INTRAVENOUS at 12:55

## 2022-04-02 RX ADMIN — DILTIAZEM HYDROCHLORIDE 2.5 MG/HR: 5 INJECTION, SOLUTION INTRAVENOUS at 10:42

## 2022-04-02 ASSESSMENT — PAIN SCALES - WONG BAKER
WONGBAKER_NUMERICALRESPONSE: 0

## 2022-04-02 ASSESSMENT — PAIN DESCRIPTION - FREQUENCY: FREQUENCY: INTERMITTENT

## 2022-04-02 ASSESSMENT — PAIN DESCRIPTION - PAIN TYPE: TYPE: ACUTE PAIN

## 2022-04-02 ASSESSMENT — PAIN DESCRIPTION - DESCRIPTORS: DESCRIPTORS: ACHING

## 2022-04-02 ASSESSMENT — PAIN DESCRIPTION - LOCATION: LOCATION: BACK

## 2022-04-02 ASSESSMENT — PAIN SCALES - GENERAL: PAINLEVEL_OUTOF10: 10

## 2022-04-02 ASSESSMENT — PULMONARY FUNCTION TESTS
PIF_VALUE: 22
PIF_VALUE: 24
PIF_VALUE: 21

## 2022-04-02 NOTE — PROGRESS NOTES
CARDIOLOGY PROGRESS NOTE      Patient Name: Bonnie Cordero  Date of admission: 3/30/2022  1:14 PM  Admission Dx: Septicemia ([facility] [A41.9]  Elevated troponin [R77.8]  NADJA (acute kidney injury) ([facility] [N17.9]  Sepsis ([facility] [A41.9]  Reason for Consult: AF with RVR   Requesting Physician: Marleny Powell MD  Primary Care physician: Shantel Santa MD    Subjective:     Bonnie Cordero is a [de-identified] y.o. patient with PMH notable for COPD, CAD s/p 2V CABG 9/2019 (LIMA to LAD, SVG to Diagonal), hypertension, and hyperlipidemia. Most recent Cath 9/4/2019 noted EF=35-40%; CAD involving ostial left main disease and mid LAD disease. Most recent Echo 8/31/2021 EF=55-60%. Most recent Camryn-Myoview 9/1/2021 noted EF=55% negative for ischemia. Pt was evaluated with my partner Dr. Michael Reilly 3/31 for elevated heart rate and troponins. Noted hypotensive on arrival. Inc oxygen requirement from [facility]. Malaise, dyspnea, palpitations endorsed. Admit EKG SR with SA; RSR' in V1 ; left axis deviation; LAFB; inferior infarct (no change 10/17). Second EKG with narrow QRS tachycardia 171bpm and 3rd EKG  with PAC's; BNP of 17,516; Gilberto 0.16 and 0.12, 0.33, 0.23; Cr 2.1; Mg+ 1.5, 2.8; COVID and flu are negative.  Blood cultures were sent.  Urine positive nitrites large leukocytes with  WBCs +3 bacteria. Note CXR suggested pulmonary edema or infection. This AM she complains of shortness of breath, air hunger. Anxious appearing. On 3L NC. No chest pain. +palpitations. Perseverating in conversation with me. Home Medications:  Were reviewed and are listed in nursing record and/or below  Prior to Admission medications    Medication Sig Start Date End Date Taking?  Authorizing Provider   busPIRone (BUSPAR) 5 MG tablet Take 5 mg by mouth 3 times daily   Yes Historical Provider, MD   Fluticasone-Umeclidin-Vilant (TRELEGY ELLIPTA) 200-62.5-25 MCG/INH AEPB Inhale 1 puff into the lungs daily   Yes Historical Provider, MD   albuterol (PROVENTIL) (2.5 MG/3ML) 0.083% nebulizer solution Take 2.5 mg by nebulization every 4 hours as needed for Wheezing   Yes Historical Provider, MD   sertraline (ZOLOFT) 50 MG tablet Take 50 mg by mouth daily   Yes Historical Provider, MD   predniSONE (DELTASONE) 5 MG tablet Take 5 mg by mouth daily   Yes Historical Provider, MD   guaiFENesin (MUCINEX) 600 MG extended release tablet Take 600 mg by mouth 2 times daily   Yes Historical Provider, MD   pantoprazole (PROTONIX) 40 MG tablet Take 40 mg by mouth daily   Yes Historical Provider, MD   Roflumilast (DALIRESP) 500 MCG tablet Take 500 mcg by mouth daily   Yes Historical Provider, MD   OXYGEN Inhale 2 L into the lungs 6/1/21   Historical Provider, MD   ondansetron (ZOFRAN-ODT) 4 MG disintegrating tablet Take 1 tablet by mouth every 8 hours as needed for Nausea or Vomiting 9/27/21   Luz Elena Parmar MD   furosemide (LASIX) 20 MG tablet Take 1 tablet by mouth See Admin Instructions Tuesday, friday 9/27/21   Luz Elena Parmar MD   magnesium oxide (MAG-OX) 400 (241.3 Mg) MG TABS tablet Take 1 tablet by mouth daily 9/28/21   Luz Elena Parmar MD   fluticasone (FLONASE) 50 MCG/ACT nasal spray 1 spray by Each Nostril route daily as needed for Rhinitis 9/14/21   Elicia Muhammad MD   ferrous sulfate (IRON 325) 325 (65 Fe) MG tablet Take 325 mg by mouth daily (with breakfast)    Historical Provider, MD   acetaminophen (TYLENOL) 500 MG tablet Take 500 mg by mouth every 6 hours as needed for Pain    Historical Provider, MD   atorvastatin (LIPITOR) 20 MG tablet Take 1 tablet by mouth nightly  Patient taking differently: Take 40 mg by mouth nightly  9/24/19   Tawanda Garcia MD   metoprolol tartrate (LOPRESSOR) 25 MG tablet Take 1 tablet by mouth 2 times daily 9/24/19   Tawanda Garcia MD   docusate sodium (COLACE, DULCOLAX) 100 MG CAPS Take 100 mg by mouth 2 times daily  Patient taking differently: Take 100 mg by mouth 2 times daily as needed  9/24/19   [facility] Naya Barone MD   clopidogrel (PLAVIX) 75 MG tablet Take 1 tablet by mouth daily 9/7/19   Greta Jones MD        CURRENT Medications:  sodium chloride (OCEAN, BABY AYR) 0.65 % nasal spray 1 spray, PRN  QUEtiapine (SEROQUEL) tablet 25 mg, BID  dilTIAZem injection 10 mg, Once   Followed by  dilTIAZem 125 mg in dextrose 5 % 125 mL infusion, Continuous  heparin (porcine) injection 3,500 Units, Once  heparin (porcine) injection 3,500 Units, PRN  heparin (porcine) injection 1,700 Units, PRN  heparin (porcine) 25,000 Units in dextrose 5 % 250 mL infusion, Continuous  fluticasone (FLONASE) 50 MCG/ACT nasal spray 1 spray, Daily  vancomycin (VANCOCIN) oral solution 125 mg, 4 times per day  cefTRIAXone (ROCEPHIN) 1000 mg IVPB in 50 mL D5W minibag, Q24H  norepinephrine (LEVOPHED) 16 mg in dextrose 5 % 250 mL infusion, Continuous  0.9 % sodium chloride infusion, PRN  insulin lispro (HUMALOG) injection vial 0-6 Units, TID WC  insulin lispro (HUMALOG) injection vial 0-3 Units, Nightly  atorvastatin (LIPITOR) tablet 40 mg, Nightly  glucose (GLUTOSE) 40 % oral gel 15 g, PRN  glucagon (rDNA) injection 1 mg, PRN  dextrose 5 % solution, PRN  dextrose bolus (hypoglycemia) 10% 125 mL, PRN   Or  dextrose bolus (hypoglycemia) 10% 250 mL, PRN  ipratropium-albuterol (DUONEB) nebulizer solution 1 ampule, Q4H PRN  ipratropium-albuterol (DUONEB) nebulizer solution 3 mL, 4x daily  clopidogrel (PLAVIX) tablet 75 mg, Daily  ferrous sulfate (IRON 325) tablet 325 mg, Daily with breakfast  sertraline (ZOLOFT) tablet 50 mg, Daily  sodium chloride flush 0.9 % injection 5-40 mL, 2 times per day  sodium chloride flush 0.9 % injection 5-40 mL, PRN  0.9 % sodium chloride infusion, PRN  ondansetron (ZOFRAN-ODT) disintegrating tablet 4 mg, Q8H PRN   Or  ondansetron (ZOFRAN) injection 4 mg, Q6H PRN  polyethylene glycol (GLYCOLAX) packet 17 g, Daily PRN  acetaminophen (TYLENOL) tablet 650 mg, Q6H PRN   Or  acetaminophen (TYLENOL) suppository 650 mg, Q6H PRN  0.9 % sodium chloride infusion, Continuous  pantoprazole (PROTONIX) tablet 40 mg, Daily  predniSONE (DELTASONE) tablet 5 mg, Daily with breakfast        Allergies:  Latex and Codeine     Review of Systems:   A 14 point review of symptoms completed. Pertinent positives identified in the HPI, all other review of symptoms negative. Objective:     Vitals:    04/02/22 0630 04/02/22 0701 04/02/22 0800 04/02/22 0900   BP: (!) 160/79 139/84 (!) 166/86 (!) 124/97   Pulse: 140 125 154 128   Resp: (!) 36 (!) 31 (!) 34 27   Temp:   97.1 °F (36.2 °C)    TempSrc:   Axillary    SpO2: 92% 98% 95% 97%   Weight:       Height:          Weight: 137 lb (62.1 kg)       PHYSICAL EXAM:    General:  Alert, cooperative, no distress, appears stated age   Head:  Normocephalic, atraumatic   Eyes:  Conjunctiva/corneas clear, anicteric sclerae    Nose: Nares normal, no drainage or sinus tenderness   Throat: Dry membranes   Neck: Trachea midline. Neck supple with no lymphadenopathy, thyroid not enlarged, symmetric, no tenderness/mass/nodules    Lungs:   Wheezing bilat lung fields   Chest Wall:  No deformity or tenderness to palpation   Heart:  Tachycardic, irregular, normal S1, normal S2, no murmur, no rub, no S3/S4, PMI non-palpable. Abdomen:   Soft, non-tender, with normoactive bowel sounds. No masses, no hepatosplenomegaly   Extremities: No cyanosis, clubbing or pitting edema. Vascular: 2+ radial,2+ dorsalis pedis and posterior tibial pulses bilaterally. Brisk carotid upstrokes without carotid bruit. Skin: Skin color, texture, turgor are normal with no rashes or ulceration. Pysch: Euthymic mood, appropriate affect   Neurologic: Oriented to person, place and time. No slurred speech or facial asymmetry. No motor or sensory deficits on gross examination.          Labs:   CBC:   Lab Results   Component Value Date    WBC 28.6 04/01/2022    RBC 3.25 04/01/2022    HGB 9.6 04/01/2022    HCT 30.1 04/01/2022    MCV 92.6 04/01/2022    RDW 13.4 04/01/2022     04/01/2022     CMP:  Lab Results   Component Value Date     04/02/2022    K 3.7 04/02/2022    K 3.1 03/31/2022     04/02/2022    CO2 20 04/02/2022    BUN 29 04/02/2022    CREATININE 1.4 04/02/2022    GFRAA 44 04/02/2022    GFRAA >60 05/08/2013    AGRATIO 1.1 03/31/2022    LABGLOM 36 04/02/2022    GLUCOSE 92 04/02/2022    PROT 4.9 03/31/2022    PROT 7.81 12/20/2012    CALCIUM 8.8 04/02/2022    BILITOT 0.3 03/31/2022    ALKPHOS 81 03/31/2022    AST 16 03/31/2022    ALT 6 03/31/2022     PT/INR:  No results found for: PTINR  HgBA1c:  Lab Results   Component Value Date    LABA1C 5.4 01/10/2022     Lab Results   Component Value Date    CKTOTAL 96 11/03/2015    TROPONINI 0.14 (H) 03/31/2022         Interval Testing/Data:     Telemetry personally reviewed:     EKG 3/30/2022  Normal sinus rhythm with sinus arrhythmiaRSR' or QR pattern in V1 suggests right ventricular conduction delayBaseline artifactLeft axis deviationLeft anterior fascicular blockInferior infarct , age undeterminedAbnormal ECGNo previous ECGs availableConfirmed by TOR Celaya MD ([id number]) on 3/30/2022 5:47:16 PM      EKG 3/230/22   Narrow QRS tachycardia ; undeterminedLeft axis deviationLeft anterior fascicular blockIncomplete right bundle branch blockPossible Anteroseptal infarct , age undeterminedAbnormal ECGWhen compared with ECG of 30-MAR-2022 15:50,Vent. rate has increased BY  81 BPMConfirmed by TOR Celaya MD ([id number]) on 3/31/2022 7:43:06 AM      EKG 3/31/2022  Sinus tachycardia with Premature supraventricular complexesLeft axis deviationLeft anterior fascicular blockNon-specific intra-ventricular conduction delayNonspecific ST abnormalityAbnormal ECGWhen compared with ECG of 30-MAR-2022 18:10, (unconfirmed)Vent.  rate has decreased BY  69 BPMConfirmed by TOR Celaya MD ([id number]) on 3/31/2022 7:43:50 AM      CXR 3/30/2022       FINDINGS:   Status post CABG.  Heart size stable.  Bilateral ill-defined linear pulmonary   opacities.  No pneumothorax.  No pleural effusion.           Camryn-Myoview 9/1/2021  Summary  Normal LV function. Piter Luke is uniform isotope uptake at stress and rest. There is no evidence of  myocardial ischemia.         TTE September 2019:   Summary   Left ventricular cavity size is normal. Normal left ventricular wall   thickness. Overall left ventricular systolic function appears low normal   with an estimated ejection fraction of 50%. No regional wall motion   abnormalities are noted. Diastolic function is indeterminate.   Mild mitral regurgitation is present.   The right ventricle is normal in size and function.     [facility] 9/2019  LEFT MAIN:  Left main does have ostial disease of approximately 70%.  There is catheter dampening upon engagement of the left main.     LEFT ANTERIOR DESCENDING:  The LAD course to and wraps around the apex. Gives off a large diagonal branch with multiple terminal divisions.  Just at the diagonal branch, it was free of significant obstructive disease.  The LAD does continue, and just after the large diagonal branch, has 90% stenosis in the LAD proper.     LEFT CIRCUMFLEX:  Circumflex is small nondominant vessel.  It consists  essentially of a single marginal branch.  It is free of significant  obstructive disease.     RIGHT CORONARY ARTERY:  Right coronary artery is very large dominant  vessel.  It gives off a large PDA and two large posterolateral branches. Right coronary artery is free of significant obstructive disease.     IMPRESSION:  1.  LV dysfunction with estimated ejection fraction of 35 to 40% with  inferior hypo to akinesis. 2.  Coronary artery disease as described above including ostial left main disease (with catheter dampening upon engagement) and mid LAD  disease. 3.  Recommend surgical consultation. 4.  Successful Angio-Seal of right femoral arteriotomy site. 5.  Estimated blood loss less than 5-10cc. Impression and Plan      1.  Palpitations, SVT, MAT vs. AF  2. Severe Sepsis with UTI, e coli bacteremia   3. Acute on chronic hypoxic resp failure  4. NADJA  5. CAD status post 2-v CABG 9/19  6. Elevated troponin thought 2/2 sepsis/hypotension/elevated HR amidst underlying CAD  7. EF 50%. 8. HLD  9. COPD, end stage, on 2L Home O2  10. Diarrhea, recent C diff infection  11. Anxiety     [id number] Score for Atrial Fibrillation Stroke Risk   Risk   Factors  Component Value   C CHF Yes 1   H HTN Yes 1   A2 Age >= 76 Yes,  [de-identified] y.o.) 2   D DM No 0   S2 Prior Stroke/TIA No 0   V Vascular Disease Yes 1   A Age 74-69 No,  [de-identified] y.o.) 0   Sc Sex female 1    [id number]  Score  6   Score last updated 4/2/22 4:98 PM EDT    Click here for a link to the UpToDate guideline \"Atrial Fibrillation: Anticoagulation therapy to prevent embolization    Disclaimer: Risk Score calculation is dependent on accuracy of patient problem list and past encounter diagnosis.     Patient Active Problem List   Diagnosis    Hyperlipidemia    Asthma    OA (osteoarthritis)    Tobacco use    COPD exacerbation (HCC)    Septicemia ([address]    Abnormal chest x-ray    COPD with acute exacerbation (HCC)    Shortness of breath    Tobacco abuse    Moderate malnutrition ([address]    NSTEMI (non-ST elevated myocardial infarction) (HCC)    Elevated troponin    Acute respiratory failure with hypoxia (HCC)    CAD (coronary artery disease)    Acute pulmonary edema (HCC)    Pulmonary infiltrate    Elevated brain natriuretic peptide (BNP) level    Leukocytosis    Ischemic cardiomyopathy    Acute combined systolic and diastolic congestive heart failure (HCC)    Hypernatremia    NADJA (acute kidney injury) ([address]    Status post aorto-coronary artery bypass graft    Mucus plugging of bronchi    CAD in native artery    S/P CABG x 2    Pneumonia of both lower lobes due to methicillin resistant Staphylococcus aureus (MRSA) (HCC)    GI bleed    Severe malnutrition (HCC)    Chest pain    Chronic respiratory failure with hypoxia (HCC)    Primary hypertension    Anemia    Acute respiratory failure (HCC)    Acute respiratory distress    Volume overload    Demand ischemia (HCC)    GERD (gastroesophageal reflux disease)    Acute respiratory failure with hypoxia and hypercapnia (HCC)    Shock (HCC)    Pneumonia due to infectious organism    Acute on chronic respiratory failure with hypoxia and hypercapnia (HCC)    Chronic obstructive pulmonary disease (HCC)    Acute on chronic respiratory failure with hypoxemia (HCC)    Chronic anxiety    Congestive heart failure (HCC)    Acute respiratory failure with hypoxia and hypercarbia (HCC)    Acute kidney injury (HCC)    Elevated procalcitonin    COPD, severe (HCC)    Anxiety    Severe anxiety    Severe protein-calorie malnutrition (HCC)    HCAP (healthcare-associated pneumonia)    Pleural effusion    Hyperglycemia    Sepsis (HCC)    Tachycardia    Acute hypoxemic respiratory failure (HCC)    Septic shock (HCC)    Bacteremia    Urinary tract infection without hematuria       PLAN:  1. Starting diltiazem gtt   2. Restart metoprolol 25 mg BID to start. Titrate as needed to wean gtt. 3. Asipirin/plavix/BB for CAD  4. Placed on heparin gtt for CVA prophylaxis - eventual transition to oral AC.  5. Optimize anxiety management. Consider low dose ativan for air hunger/anxiety in setting of end stage COPD   6. Treatment of sepsis/Acute resp failure per IM/crit care services. Guarded prognosis. Will follow. I will address the patient's cardiac risk factors and adjusted pharmacologic treatment as needed. In addition, I have reinforced the need for patient directed risk factor modification. All questions and concerns were addressed to the patient/family. Alternatives to my treatment were discussed. Thank you for allowing us to participate in the care of [facility]. Please call me with any questions [phone].     Abbie Au MD, [address] [facility]  [phone] [facility]  [phone] [address] [place] [address]  4/2/2022 10:11 AM

## 2022-04-02 NOTE — PROGRESS NOTES
Shift assessment completed as documented on flowsheet. VSS at this time. PT very sob this am and currently wearing bipap. Lungs diminished. Purewick in place. Call light in reach.  Monitoring closley

## 2022-04-02 NOTE — PROGRESS NOTES
Pulmonary & Critical Care Medicine ICU Progress Note    CC: Sepsis    Events of Last 24 hours:    cc/hr   A. fib RVR overnight  BiPAP 14/7 40%   Anxiety/agitation overnight not better with Buspar     Vascular lines: IV: L IJ          / / /FiO2 : 40 %  No results for input(s): PHART, POD9BZR, PO2ART in the last 72 hours. IV:   norepinephrine Stopped (22 1501)    sodium chloride      dextrose      sodium chloride      sodium chloride 100 mL/hr at 22 0519       Vitals:  Blood pressure 139/84, pulse 125, temperature 97.6 °F (36.4 °C), temperature source Axillary, resp. rate (!) 31, height 5' 4\" (1.626 m), weight 137 lb (62.1 kg), SpO2 98 %, not currently breastfeeding. on 4L  Temp  Av.6 °F (36.4 °C)  Min: 96.3 °F (35.7 °C)  Max: 98.3 °F (36.8 °C)    Intake/Output Summary (Last 24 hours) at 2022 0718  Last data filed at 2022 0080  Gross per 24 hour   Intake 2658.71 ml   Output 450 ml   Net 2208.71 ml     PE:  Gen: + distress. Eyes: PERRL. No sclera icterus. No conjunctival injection. ENT: No discharge. Pharynx clear. Neck: Trachea midline. No obvious mass. Resp: No accessory muscle use. Few crackles. No wheezes. No rhonchi. No dullness on percussion. CV: Tachy rate. Regular rhythm. No murmur or rub. No edema. GI: Mild tenderness. Non-distended. No hernia. Skin: Warm and dry. No nodule on exposed extremities. Lymph: No cervical LAD. No supraclavicular LAD. M/S: + mildly cyanosis in the feet. No joint deformity. No clubbing. Neuro: Awake. Alert. Moves all four extremities. Psych: Oriented x 3.  No anxiety    Scheduled Meds:   pantoprazole  40 mg IntraVENous Once    busPIRone  10 mg Oral TID    fluticasone  1 spray Each Nostril Daily    vancomycin  125 mg Oral 4 times per day    cefTRIAXone (ROCEPHIN) IV  1,000 mg IntraVENous Q24H    insulin lispro  0-6 Units SubCUTAneous TID WC    insulin lispro  0-3 Units SubCUTAneous Nightly    atorvastatin  40 mg Oral Nightly    ipratropium-albuterol  3 mL Inhalation 4x daily    clopidogrel  75 mg Oral Daily    ferrous sulfate  325 mg Oral Daily with breakfast    sertraline  50 mg Oral Daily    sodium chloride flush  5-40 mL IntraVENous 2 times per day    heparin (porcine)  5,000 Units SubCUTAneous BID    pantoprazole  40 mg Oral Daily    predniSONE  5 mg Oral Daily with breakfast       Data:  CBC:   Recent Labs     03/30/22  1346 03/31/22  0530 04/01/22  0430   WBC 28.2* 38.4* 28.6*   HGB 11.6* 9.8* 9.6*   HCT 36.0 31.0* 30.1*   MCV 91.4 92.8 92.6    287 246     BMP:   Recent Labs     03/31/22  0530 04/01/22  0430 04/02/22  0515    144 144   K 3.1* 3.2* 3.7    111* 115*   CO2 22 20* 20*   BUN 35* 35* 29*   CREATININE 2.2* 1.6* 1.4*     LIVER PROFILE:   Recent Labs     03/30/22  1346 03/31/22  0530   AST 17 16   ALT 7* 6*   BILITOT 0.4 0.3   ALKPHOS 89 81       Microbiology:  3/30 UC E. Coli  3/30 BC E. Coli  3/30 COVID-19 not detected  3/31 C. difficile negative    Imaging:  Chest x-ray 3/30 imaging was reviewed by me and showed   Bilateral ill-defined airspace disease     CT abdomen/Pelvis 4/2  Mild body wall anasarca, small pleural effusions, and small amount of   abdominal and pelvic ascites, compatible with fluid overload   Scattered areas of colonic wall thickening are seen, either due to the   partially contracted state of the colon or wall thickening from underlying   colitis   Posterior fundal fibroid versus thickening of the endometrial stripe. Recommend follow-up non urgent pelvic ultrasound for further characterization      Echo 8/29/2021  Normal left ventricular systolic function with ejection fraction of   55-60%. No regional wall motion abnormalites are seen.  Left ventricular   diastolic filling pressure is elevated.   Compared to previous study from 9-4-2019 no changes noted in left   ventricular function.   Mild mitral regurgitation        ASSESSMENT:  · Acute hypoxemic respiratory failure  · Septic shock  · E. coli bacteremia  · E. coli UTI  · Acute kidney injury  · Electrolyte disorder  · Tachyarrythmias/A. fib with RVR  · History of history of C. difficile troponin  · COPD chronically on home O2 2.5 L and AVAPS- not in exacerbation   · Chronic anxiety with panic attacks  · CAD post CABG 2019        PLAN:  · Supplemental oxygen to maintain SaO2 >92%; wean as tolerated  · BiPAP QHS and PRN during the day   · Closely monitory airways, clinical status, cardiac rhythm, vital signs, urine output  · Inhaled bronchodilators  · Home dose 5 mg prednisone   · Maintenance IVF NS at 100 cc/hr  · Ceftriaxone D#3 and Vanc  mg PO QID D#3  · Change ceftriaxone to Zosyn  · IV Levophed to keep MAP > 65 mmHg or SBP>90  · Follow up Cx   · Aspirin, Plavix, beta-blocker, cardiology following   · Cardizem drip   · Troponin   · Blood sugar control, with goal 140-180  · DVT prophylaxis:  Heparin drip  · MRSA prophylaxis: Bactroban  · Code status: Full code     Called back for worsening respiratory status and altered mental status. ABG showed hypercapnia despite being on BiPAP. Patient intubated placed mechanical ventilation. Hypotensive post sedation for intubation placed on Levophed drip. We will stop bicarb drip. Total critical care time caring for this patient with life threatening, unstable organ failure, including direct patient contact, management of life support systems, review of data including imaging and labs, discussions with other team members and physicians is 40 minutes, excluding procedures.

## 2022-04-02 NOTE — CONSULTS
Low dose Heparin - AFib:  ABW 58kg. Give a 3,500 unit IV Bolus dose of heparin and begin the infusion at 7mL/hr. Check an anti-Xa at 1600 Desired range is 0.3-0.7IU/mL. Pharmacy to manage Heparin - contact for questions. anti-Xa < 0.1    Heparin 60 units/kg bolus  Increase infusion by 4 units/kg/hr  anti-Xa 0.1-0.29 Heparin 30 units/kg bolus Increase infusion by 2 units/kg/hr  anti-Xa 0.3-0.7    No bolus No change   anti-Xa 0.71-0.8   No bolus Decrease infusion by 1 units/kg/hr  anti-Xa 0.81-0.99    No bolus Decrease infusion by 2 units/kg/hr  anti-Xa 1 or more     Hold heparin for 1 hour Decrease infusion by 3 units/kg/hr    Obtain anti-Xa hours after bolus and 6 hours after any dose change until two consecutive therapeutic anti-Xa are achieved- then daily.     MARIO ALBERTO [facility] PharmD 4/2/2022 9:39 AM

## 2022-04-02 NOTE — PROGRESS NOTES
Patient is on bipap 100%, preparing intubation with Dr. Ji Dawkins and RT at bedside. Pt has peripheral IV's along with Right IJ central line. 163/69 B/P, telemery Sinus tachy with rate 131, oxygen saturations 100%. 1142 2 mg versed 50 mcg fentanyl with flush    1143 ketamine 35 mg given with flush    1144 7 ml rocuronium given with flush    1144 ET 7.5 #22 good color change    B/P 124/51, telemetry sinus tachy rate 109    Propofol initiated. Portable chest xray ordered.

## 2022-04-02 NOTE — PROGRESS NOTES
04/01/22 2341   NIV Type   NIV Started/Stopped On   Equipment Type v60   Mode Bilevel   Mask Type Full face mask   Mask Size Small   Settings/Measurements   IPAP 14 cmH20   CPAP/EPAP 7 cmH2O   Rate Ordered 12   Resp 23   FiO2  40 %   Vt Exhaled 447 mL   Minute Volume 8.9 Liters   Mask Leak (lpm) 3 lpm   Comfort Level Good   Using Accessory Muscles No   SpO2 100   Alarm Settings   Alarms On Y   Press Low Alarm 8 cmH2O   High Pressure Alarm 40 cmH2O   Delay Alarm 20 sec(s)   Resp Rate Low Alarm 12   High Respiratory Rate 40 br/min   Oxygen Therapy/Pulse Ox   SpO2 100 %

## 2022-04-02 NOTE — PROGRESS NOTES
Hospitalist Progress Note      PCP: Destiney Drummond MD    Date of Admission: 3/30/2022    Chief Complaint: SOB from home via EMS. Hx of end stage COPD. Home o2 dependent - reports 2.5L baseline - increases to 3-4L when SOB     Recent treated for Cdiff diarrhea. Reports she finished Abx about a week ago - per home care notes Vanc was extended from 3/24-3/29 after the initial treatment and she finished it on 3/29. Pt reports foul smelling urine but denies dysuria. Subjective:     Diarrhea resolved. Worsening anxiety, worsening and severe dyspnea this am with increased work of breathing and accessory muscle use. Less awake, though does respond appropriately. HR 641M systolic. BP has been in 140s.              Medications:  Reviewed    Infusion Medications    dilTIAZem Stopped (04/02/22 1200)    heparin (PORCINE) Infusion 700 Units/hr (04/02/22 1013)    IV infusion builder 150 mL/hr at 04/02/22 1219    norepinephrine 5 mcg/min (04/02/22 1257)    sodium chloride      dextrose      sodium chloride      sodium chloride 100 mL/hr at 04/02/22 0519     Scheduled Medications    QUEtiapine  25 mg Oral BID    metoprolol tartrate  25 mg Oral BID    piperacillin-tazobactam  3,375 mg IntraVENous Q8H    sodium bicarbonate        methylPREDNISolone  40 mg IntraVENous Q8H    methylPREDNISolone sodium        levalbuterol  1.25 mg Nebulization 4x daily    fluticasone  1 spray Each Nostril Daily    vancomycin  125 mg Oral 4 times per day    insulin lispro  0-6 Units SubCUTAneous TID WC    insulin lispro  0-3 Units SubCUTAneous Nightly    atorvastatin  40 mg Oral Nightly    clopidogrel  75 mg Oral Daily    ferrous sulfate  325 mg Oral Daily with breakfast    sertraline  50 mg Oral Daily    sodium chloride flush  5-40 mL IntraVENous 2 times per day    pantoprazole  40 mg Oral Daily    predniSONE  5 mg Oral Daily with breakfast     PRN Meds: sodium chloride, heparin (porcine), heparin (porcine), sodium chloride, glucose, glucagon (rDNA), dextrose, dextrose bolus (hypoglycemia) **OR** dextrose bolus (hypoglycemia), ipratropium-albuterol, sodium chloride flush, sodium chloride, ondansetron **OR** ondansetron, polyethylene glycol, acetaminophen **OR** acetaminophen      Intake/Output Summary (Last 24 hours) at 4/2/2022 1318  Last data filed at 4/2/2022 8663  Gross per 24 hour   Intake 2658.71 ml   Output 300 ml   Net 2358.71 ml       Physical Exam Performed:    /67   Pulse 114   Temp 97.1 °F (36.2 °C) (Axillary)   Resp 30   Ht 5' 4\" (1.626 m)   Wt 137 lb (62.1 kg)   SpO2 91%   BMI 23.52 kg/m²     General appearance: No apparent distress, appears stated age and cooperative. HEENT: Pupils equal, round, and reactive to light. Conjunctivae/corneas clear. Neck: Supple, with full range of motion. No jugular venous distention. Trachea midline. Respiratory:  Normal respiratory effort. Clear to auscultation, bilaterally without Rales/Wheezes/Rhonchi. Cardiovascular: Regular rate and rhythm with normal S1/S2 without murmurs, rubs or gallops. Abdomen: Soft, non-tender, non-distended with normal bowel sounds. Musculoskeletal: No clubbing, cyanosis or edema bilaterally. Full range of motion without deformity. Skin: Skin color, texture, turgor normal.  No rashes or lesions. Neurologic:  Neurovascularly intact without any focal sensory/motor deficits.  Cranial nerves: II-XII intact, grossly non-focal.  Psychiatric: Alert and oriented, thought content appropriate, normal insight  Capillary Refill: Brisk,3 seconds, normal   Peripheral Pulses: +2 palpable, equal bilaterally       Labs:   Recent Labs     03/31/22  0530 04/01/22  0430 04/02/22  1005   WBC 38.4* 28.6* 28.6*   HGB 9.8* 9.6* 10.8*   HCT 31.0* 30.1* 33.9*    246 280     Recent Labs     03/31/22  0530 04/01/22  0430 04/02/22  0515    144 144   K 3.1* 3.2* 3.7    111* 115*   CO2 22 20* 20*   BUN 35* 35* 29* CREATININE 2.2* 1.6* 1.4*   CALCIUM 8.2* 8.8 8.8     Recent Labs     03/30/22  1346 03/31/22  0530   AST 17 16   ALT 7* 6*   BILITOT 0.4 0.3   ALKPHOS 89 81     No results for input(s): INR in the last 72 hours. Recent Labs     03/31/22  0307 03/31/22  0930 04/02/22  1005   TROPONINI 0.23* 0.14* 0.11*       Urinalysis:      Lab Results   Component Value Date    NITRU POSITIVE 03/30/2022    WBCUA  03/30/2022    BACTERIA 3+ 03/30/2022    RBCUA 3-4 03/30/2022    BLOODU MODERATE 03/30/2022    SPECGRAV 1.015 03/30/2022    GLUCOSEU Negative 03/30/2022       Radiology:  XR CHEST PORTABLE   Final Result   Satisfactory line and tube placement. CT ABDOMEN PELVIS WO CONTRAST Additional Contrast? None   Final Result   Mild body wall anasarca, small pleural effusions, and small amount of   abdominal and pelvic ascites, compatible with fluid overload      Scattered areas of colonic wall thickening are seen, either due to the   partially contracted state of the colon or wall thickening from underlying   colitis      Posterior fundal fibroid versus thickening of the endometrial stripe. Recommend follow-up non urgent pelvic ultrasound for further characterization. XR CHEST PORTABLE   Final Result   Interval placement of a left IJ approach central venous catheter with the tip   overlying the mid SVC, with no pneumothorax identified.          XR CHEST PORTABLE   Final Result   Bilateral ill-defined linear pulmonary opacities could represent pulmonary   edema or infection                 Assessment/Plan:    Active Hospital Problems    Diagnosis     Atrial fibrillation with RVR ([facility]) [I48.91]     Electrolyte disorder [E87.8]     Tachycardia [R00.0]     Acute hypoxemic respiratory failure ([facility] [address] [J96.01]     Septic shock ([facility]) [A41.9, R65.21]     Bacteremia [R78.81]     Urinary tract infection without hematuria [N39.0]     Sepsis ([address] [A41.9]     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 Diarrhea - recent Cdiff infection. Bacteremia - Ecoli ([facility]) on blood cultures  And also on urine culture from 3/30  IV rocephin - to IV zosyn  Empiric PO vanc - will need to be on this while on other Abx and for at least 10 days after due to recent Cdiff. off levophed        COPD severe end stage disease with AE. Acute on chronic hypoxic resp failure. Decompensated 4/2 and intubated urgently   Solumedrol IV. Xopenex due to severe sinus tachycardia. Possibly Afib Overnight. Sinus tachy this am.   Off dilt gtt. On heparin gtt. Acute Acidemia. pH 7.08, mixed respiratory and metabolic acidosis resulting in severe acidemia. 1 amp of bicarb   Intubated 4/2  IVF adjusted. Abx adjusted. Mood disorder on zoloft. CAD  On plavix. BB off due to septic shock. Statin on hold. Hx of GI bleed. Cont PPI. Monitor Hgb. Mod to severe protein calorie malnutrition. Supplement as tolerates. DVT Prophylaxis: heparin  Diet: ADULT DIET; Regular; Low Potassium (Less than 3000 mg/day); Low Phosphorus (Less than 1000 mg)  Code Status: Full Code      Dispo - icu. Intubated 4/2  Prognosis poor.        Rubi Cramer MD

## 2022-04-02 NOTE — PROGRESS NOTES
Anti-Xa (16:30) = 0.38. Therapeutic. Continue Heparin infusion at 700 units/hr. Next Anti-Xa scheduled for today at 22:30.   Donna Lainez R.Ph.4/2/20224:53 PM

## 2022-04-02 NOTE — PROGRESS NOTES
04/02/22 1937   Vent Information   Vent Type 840   Vent Mode AC/VC   Vt Ordered 330 mL   Rate Set 23 bmp   Peak Flow 60 L/min   FiO2  45 %   SpO2 100 %   SpO2/FiO2 ratio 222.22   Sensitivity 3   PEEP/CPAP 5   Humidification Source Heated wire   Humidification Temp 37   Humidification Temp Measured 36.8   Circuit Condensation Drained   Vent Patient Data   Peak Inspiratory Pressure 24 cmH2O   Mean Airway Pressure 9.3 cmH20   Rate Measured 25 br/min   Vt Exhaled 443 mL   Minute Volume 9.84 Liters   I:E Ratio 1:3.3   Plateau Pressure 15 XPT35   Static Compliance 44 mL/cmH2O   Dynamic Compliance 32 mL/cmH2O   Cough/Sputum   Sputum How Obtained Suctioned;Endotracheal   Sputum Amount Small   Sputum Color Cloudy   Tenacity Thick   Spontaneous Breathing Trial (SBT) RT Doc   Pulse 69   Breath Sounds   Right Upper Lobe Expiratory Wheezes   Right Middle Lobe Expiratory Wheezes   Right Lower Lobe Diminished   Left Upper Lobe Expiratory Wheezes   Left Lower Lobe Diminished   Additional Respiratory  Assessments   Resp 18   Position Semi-Hernadez's   Patient Observation   Observations ETT 7.5   ETT (adult)   Placement Date/Time: 04/02/22 1130   Preoxygenation: Yes  Tube Size: 7.5 mm  Laryngoscope: GlideScope  Location: Oral  Secured at: 22 cm  Placed By: Other (Comment)  Measured From: Lips   Secured at 22 cm   Measured From Lips   ET Placement Left   Secured By Commercial tube miller   Site Condition Dry

## 2022-04-02 NOTE — PROCEDURES
ENDOTRACHEAL INTUBATION    INDICATION: Life threatening respiratory failure    TIME OUT: taken    SEDATION: Ketamine and rocuronium    PROCEDURE: Using direct laryngoscopy, the vocal cords were well visualized and an 7.5 mm endotracheal tube was place directly through the cords. Good breath sounds auscultated bilaterally without sounds over abdomen. Good return of CO2 on the monitor. CXR reviewed by me and showed satisfactory position of ET tube.

## 2022-04-02 NOTE — PROGRESS NOTES
Dr Christi Harrell at bedside, update given. Asked about pt urine, Pt currently as a purewick in, and has not had any urine output since before pt was intubated and sedated. At this time Dr Christi Harrell  Requested bond cath due to obvious urine retention and needing accurate I/O's since pt has history of CHF and on pressor support of Levophed. Bladder scan was preformed with 0ml of urine showing in the scan. Writer placed 16French temp sensing bond on first attempt using sterile technique. Pt tolerated well, clear yellow urine returned. Pt had >400 ml yellow urine return.

## 2022-04-02 NOTE — PROGRESS NOTES
0557: Pt c/o SOB and anxiety on 4L NC. Place on bipap with FiO2 40%. Pt -170's. Fito RT called and at bedside. Dr Melina Rowe notified of pt increased anxiety and HR per perfectserve.    0605: Pt -200's. EKG obtained. Dr Melina Rowe notified EKG showed Afib with RVR. Dr Melina Rowe called and said to try carotid massage. Pt c/o being hot, ice pack placed behind her neck and cool cloth to her forehead. Pt HR dropped to 170's but went back up to 180's.    0614: Dr Melina Rowe notified that pt remains Afib with RVR and extremely anxious. IV Metoprolol ordered, to give in divided doses of 2.5 mg and to wait to see if affective before giving complete dose of 5 mg. IV Ativan order. 3287: Pt given Ativan 0.5 mg and Metoprolol 2.5 mg with HR dropping to 130-140's. Pt resting in bed with eyes closed, on Bipap, no distress noted at this time. 7164: Dr Melina Rowe notified of pt being NSR. Ordered to give pt Protonix IV today.

## 2022-04-02 NOTE — PROGRESS NOTES
04/02/22 0313   NIV Type   Equipment Type v60   Mode Bilevel   Mask Type Full face mask   Mask Size Small   Settings/Measurements   IPAP 14 cmH20   CPAP/EPAP 7 cmH2O   Rate Ordered 12   Resp 24   FiO2  40 %   Vt Exhaled 484 mL   Minute Volume 11 Liters   Mask Leak (lpm) 9 lpm   Comfort Level Good   Using Accessory Muscles No   SpO2 100   Alarm Settings   Alarms On Y   Press Low Alarm 8 cmH2O   High Pressure Alarm 40 cmH2O   Delay Alarm 20 sec(s)   Resp Rate Low Alarm 12   High Respiratory Rate 40 br/min   Oxygen Therapy/Pulse Ox   SpO2 100 %

## 2022-04-02 NOTE — PLAN OF CARE
Problem: Falls - Risk of:  Goal: Will remain free from falls  Description: Will remain free from falls  Outcome: Ongoing  Goal: Absence of physical injury  Description: Absence of physical injury  Outcome: Ongoing     Problem: Skin Integrity:  Goal: Will show no infection signs and symptoms  Description: Will show no infection signs and symptoms  Outcome: Ongoing  Goal: Absence of new skin breakdown  Description: Absence of new skin breakdown  Outcome: Ongoing     Problem: Discharge Planning:  Goal: Discharged to appropriate level of care  Description: Discharged to appropriate level of care  Outcome: Ongoing     Problem: Gas Exchange - Impaired:  Goal: Levels of oxygenation will improve  Description: Levels of oxygenation will improve  Outcome: Ongoing     Problem: Infection, Septic Shock:  Goal: Will show no infection signs and symptoms  Description: Will show no infection signs and symptoms  Outcome: Ongoing     Problem: Infection - Ventilator-Associated Pneumonia:  Goal: Absence of pulmonary infection  Description: Absence of pulmonary infection  Outcome: Ongoing     Problem: Serum Glucose Level - Abnormal:  Goal: Ability to maintain appropriate glucose levels will improve  Description: Ability to maintain appropriate glucose levels will improve  Outcome: Ongoing     Problem: Tissue Perfusion, Altered:  Goal: Circulatory function within specified parameters  Description: Circulatory function within specified parameters  Outcome: Ongoing     Problem: Venous Thromboembolism:  Goal: Will show no signs or symptoms of venous thromboembolism  Description: Will show no signs or symptoms of venous thromboembolism  Outcome: Ongoing  Goal: Absence of signs or symptoms of impaired coagulation  Description: Absence of signs or symptoms of impaired coagulation  Outcome: Ongoing   Pt resting in bed, request to be placed on bipap.  Reviewed with pt plan of care for shift and medications, all questions answered, pt voices no concerns.

## 2022-04-03 ENCOUNTER — APPOINTMENT (OUTPATIENT)
Dept: GENERAL RADIOLOGY | Age: 82
DRG: 871 | End: 2022-04-03
Payer: MEDICARE

## 2022-04-03 LAB
ANION GAP SERPL CALCULATED.3IONS-SCNC: 10 MMOL/L (ref 3–16)
ANTI-XA UNFRAC HEPARIN: 0.3 IU/ML (ref 0.3–0.7)
ANTI-XA UNFRAC HEPARIN: 0.31 IU/ML (ref 0.3–0.7)
BASE EXCESS ARTERIAL: 3.5 MMOL/L (ref -3–3)
BASOPHILS ABSOLUTE: 0 K/UL (ref 0–0.2)
BASOPHILS RELATIVE PERCENT: 0.1 %
BUN BLDV-MCNC: 27 MG/DL (ref 7–20)
CALCIUM SERPL-MCNC: 8.1 MG/DL (ref 8.3–10.6)
CARBOXYHEMOGLOBIN ARTERIAL: 0.3 % (ref 0–1.5)
CHLORIDE BLD-SCNC: 104 MMOL/L (ref 99–110)
CO2: 27 MMOL/L (ref 21–32)
CREAT SERPL-MCNC: 1.3 MG/DL (ref 0.6–1.2)
EOSINOPHILS ABSOLUTE: 0 K/UL (ref 0–0.6)
EOSINOPHILS RELATIVE PERCENT: 0 %
GFR AFRICAN AMERICAN: 48
GFR NON-AFRICAN AMERICAN: 39
GLUCOSE BLD-MCNC: 120 MG/DL (ref 70–99)
GLUCOSE BLD-MCNC: 144 MG/DL (ref 70–99)
GLUCOSE BLD-MCNC: 176 MG/DL (ref 70–99)
GLUCOSE BLD-MCNC: 186 MG/DL (ref 70–99)
GLUCOSE BLD-MCNC: 225 MG/DL (ref 70–99)
GLUCOSE BLD-MCNC: 88 MG/DL (ref 70–99)
GLUCOSE BLD-MCNC: 98 MG/DL (ref 70–99)
HCO3 ARTERIAL: 27.6 MMOL/L (ref 21–29)
HCT VFR BLD CALC: 27.5 % (ref 36–48)
HEMOGLOBIN, ART, EXTENDED: 9.7 G/DL (ref 12–16)
HEMOGLOBIN: 9.1 G/DL (ref 12–16)
LYMPHOCYTES ABSOLUTE: 0.3 K/UL (ref 1–5.1)
LYMPHOCYTES RELATIVE PERCENT: 4.3 %
MCH RBC QN AUTO: 30.1 PG (ref 26–34)
MCHC RBC AUTO-ENTMCNC: 33.1 G/DL (ref 31–36)
MCV RBC AUTO: 91 FL (ref 80–100)
METHEMOGLOBIN ARTERIAL: 0.3 %
MONOCYTES ABSOLUTE: 0.1 K/UL (ref 0–1.3)
MONOCYTES RELATIVE PERCENT: 2 %
NEUTROPHILS ABSOLUTE: 6.7 K/UL (ref 1.7–7.7)
NEUTROPHILS RELATIVE PERCENT: 93.6 %
O2 SAT, ARTERIAL: 98.1 %
O2 THERAPY: ABNORMAL
PCO2 ARTERIAL: 40.3 MMHG (ref 35–45)
PDW BLD-RTO: 13.7 % (ref 12.4–15.4)
PERFORMED ON: ABNORMAL
PERFORMED ON: NORMAL
PERFORMED ON: NORMAL
PH ARTERIAL: 7.45 (ref 7.35–7.45)
PLATELET # BLD: 205 K/UL (ref 135–450)
PMV BLD AUTO: 8.1 FL (ref 5–10.5)
PO2 ARTERIAL: 106.2 MMHG (ref 75–108)
POTASSIUM SERPL-SCNC: 2.6 MMOL/L (ref 3.5–5.1)
RBC # BLD: 3.02 M/UL (ref 4–5.2)
SODIUM BLD-SCNC: 141 MMOL/L (ref 136–145)
TCO2 ARTERIAL: 28.9 MMOL/L
WBC # BLD: 7.1 K/UL (ref 4–11)

## 2022-04-03 PROCEDURE — 6370000000 HC RX 637 (ALT 250 FOR IP): Performed by: INTERNAL MEDICINE

## 2022-04-03 PROCEDURE — 94003 VENT MGMT INPAT SUBQ DAY: CPT

## 2022-04-03 PROCEDURE — 82803 BLOOD GASES ANY COMBINATION: CPT

## 2022-04-03 PROCEDURE — 85520 HEPARIN ASSAY: CPT

## 2022-04-03 PROCEDURE — 2500000003 HC RX 250 WO HCPCS

## 2022-04-03 PROCEDURE — 6360000002 HC RX W HCPCS: Performed by: INTERNAL MEDICINE

## 2022-04-03 PROCEDURE — 99291 CRITICAL CARE FIRST HOUR: CPT | Performed by: INTERNAL MEDICINE

## 2022-04-03 PROCEDURE — 94640 AIRWAY INHALATION TREATMENT: CPT

## 2022-04-03 PROCEDURE — 99233 SBSQ HOSP IP/OBS HIGH 50: CPT | Performed by: INTERNAL MEDICINE

## 2022-04-03 PROCEDURE — 6370000000 HC RX 637 (ALT 250 FOR IP): Performed by: NURSE PRACTITIONER

## 2022-04-03 PROCEDURE — 80048 BASIC METABOLIC PNL TOTAL CA: CPT

## 2022-04-03 PROCEDURE — 94761 N-INVAS EAR/PLS OXIMETRY MLT: CPT

## 2022-04-03 PROCEDURE — 2700000000 HC OXYGEN THERAPY PER DAY

## 2022-04-03 PROCEDURE — 2580000003 HC RX 258: Performed by: NURSE PRACTITIONER

## 2022-04-03 PROCEDURE — 85025 COMPLETE CBC W/AUTO DIFF WBC: CPT

## 2022-04-03 PROCEDURE — 2000000000 HC ICU R&B

## 2022-04-03 PROCEDURE — 6360000002 HC RX W HCPCS

## 2022-04-03 PROCEDURE — 71045 X-RAY EXAM CHEST 1 VIEW: CPT

## 2022-04-03 PROCEDURE — 2500000003 HC RX 250 WO HCPCS: Performed by: INTERNAL MEDICINE

## 2022-04-03 PROCEDURE — 2580000003 HC RX 258: Performed by: INTERNAL MEDICINE

## 2022-04-03 RX ORDER — POTASSIUM BICARBONATE 25 MEQ/1
50 TABLET, EFFERVESCENT ORAL ONCE
Status: COMPLETED | OUTPATIENT
Start: 2022-04-03 | End: 2022-04-03

## 2022-04-03 RX ORDER — POTASSIUM CHLORIDE 750 MG/1
40 TABLET, EXTENDED RELEASE ORAL ONCE
Status: DISCONTINUED | OUTPATIENT
Start: 2022-04-03 | End: 2022-04-03

## 2022-04-03 RX ORDER — SODIUM CHLORIDE 9 MG/ML
INJECTION, SOLUTION INTRAVENOUS CONTINUOUS
Status: DISCONTINUED | OUTPATIENT
Start: 2022-04-03 | End: 2022-04-03

## 2022-04-03 RX ORDER — POTASSIUM CHLORIDE 29.8 MG/ML
20 INJECTION INTRAVENOUS
Status: COMPLETED | OUTPATIENT
Start: 2022-04-03 | End: 2022-04-03

## 2022-04-03 RX ADMIN — LEVALBUTEROL 1.25 MG: 1.25 SOLUTION, CONCENTRATE RESPIRATORY (INHALATION) at 08:28

## 2022-04-03 RX ADMIN — HEPARIN SODIUM 820 UNITS/HR: 10000 INJECTION, SOLUTION INTRAVENOUS; SUBCUTANEOUS at 16:18

## 2022-04-03 RX ADMIN — POTASSIUM BICARBONATE 50 MEQ: 977.5 TABLET, EFFERVESCENT ORAL at 11:32

## 2022-04-03 RX ADMIN — PANTOPRAZOLE SODIUM 40 MG: 40 TABLET, DELAYED RELEASE ORAL at 08:34

## 2022-04-03 RX ADMIN — QUETIAPINE FUMARATE 25 MG: 25 TABLET ORAL at 20:20

## 2022-04-03 RX ADMIN — Medication 125 MG: at 18:12

## 2022-04-03 RX ADMIN — PIPERACILLIN AND TAZOBACTAM 3375 MG: 3; .375 INJECTION, POWDER, FOR SOLUTION INTRAVENOUS at 20:18

## 2022-04-03 RX ADMIN — Medication 125 MG: at 13:24

## 2022-04-03 RX ADMIN — PROPOFOL 10 MCG/KG/MIN: 10 INJECTION, EMULSION INTRAVENOUS at 06:13

## 2022-04-03 RX ADMIN — ATORVASTATIN CALCIUM 40 MG: 40 TABLET, FILM COATED ORAL at 20:20

## 2022-04-03 RX ADMIN — PROPOFOL 20 MCG/KG/MIN: 10 INJECTION, EMULSION INTRAVENOUS at 16:32

## 2022-04-03 RX ADMIN — PIPERACILLIN AND TAZOBACTAM 3375 MG: 3; .375 INJECTION, POWDER, FOR SOLUTION INTRAVENOUS at 11:33

## 2022-04-03 RX ADMIN — SODIUM CHLORIDE, PRESERVATIVE FREE 10 ML: 5 INJECTION INTRAVENOUS at 20:19

## 2022-04-03 RX ADMIN — SODIUM CHLORIDE: 9 INJECTION, SOLUTION INTRAVENOUS at 20:12

## 2022-04-03 RX ADMIN — Medication 125 MG: at 06:50

## 2022-04-03 RX ADMIN — SODIUM CHLORIDE, PRESERVATIVE FREE 10 ML: 5 INJECTION INTRAVENOUS at 08:23

## 2022-04-03 RX ADMIN — CLOPIDOGREL BISULFATE 75 MG: 75 TABLET ORAL at 08:22

## 2022-04-03 RX ADMIN — METHYLPREDNISOLONE SODIUM SUCCINATE 40 MG: 40 INJECTION, POWDER, FOR SOLUTION INTRAMUSCULAR; INTRAVENOUS at 18:13

## 2022-04-03 RX ADMIN — METHYLPREDNISOLONE SODIUM SUCCINATE 40 MG: 40 INJECTION, POWDER, FOR SOLUTION INTRAMUSCULAR; INTRAVENOUS at 02:18

## 2022-04-03 RX ADMIN — SODIUM BICARBONATE: 84 INJECTION, SOLUTION INTRAVENOUS at 06:18

## 2022-04-03 RX ADMIN — FERROUS SULFATE TAB 325 MG (65 MG ELEMENTAL FE) 325 MG: 325 (65 FE) TAB at 08:22

## 2022-04-03 RX ADMIN — LEVALBUTEROL 1.25 MG: 1.25 SOLUTION, CONCENTRATE RESPIRATORY (INHALATION) at 11:52

## 2022-04-03 RX ADMIN — Medication 125 MG: at 00:41

## 2022-04-03 RX ADMIN — METOPROLOL TARTRATE 25 MG: 25 TABLET, FILM COATED ORAL at 08:22

## 2022-04-03 RX ADMIN — PREDNISONE 5 MG: 5 TABLET ORAL at 08:22

## 2022-04-03 RX ADMIN — HEPARIN SODIUM 1700 UNITS: 1000 INJECTION INTRAVENOUS; SUBCUTANEOUS at 00:41

## 2022-04-03 RX ADMIN — LEVALBUTEROL 1.25 MG: 1.25 SOLUTION, CONCENTRATE RESPIRATORY (INHALATION) at 15:33

## 2022-04-03 RX ADMIN — METHYLPREDNISOLONE SODIUM SUCCINATE 40 MG: 40 INJECTION, POWDER, FOR SOLUTION INTRAMUSCULAR; INTRAVENOUS at 08:22

## 2022-04-03 RX ADMIN — PIPERACILLIN AND TAZOBACTAM 3375 MG: 3; .375 INJECTION, POWDER, FOR SOLUTION INTRAVENOUS at 03:46

## 2022-04-03 RX ADMIN — METOPROLOL TARTRATE 25 MG: 25 TABLET, FILM COATED ORAL at 20:20

## 2022-04-03 RX ADMIN — POTASSIUM CHLORIDE 20 MEQ: 29.8 INJECTION, SOLUTION INTRAVENOUS at 11:38

## 2022-04-03 RX ADMIN — SERTRALINE HYDROCHLORIDE 50 MG: 50 TABLET ORAL at 08:22

## 2022-04-03 RX ADMIN — QUETIAPINE FUMARATE 25 MG: 25 TABLET ORAL at 08:22

## 2022-04-03 RX ADMIN — LEVALBUTEROL 1.25 MG: 1.25 SOLUTION, CONCENTRATE RESPIRATORY (INHALATION) at 19:26

## 2022-04-03 RX ADMIN — POTASSIUM CHLORIDE 20 MEQ: 29.8 INJECTION, SOLUTION INTRAVENOUS at 11:37

## 2022-04-03 ASSESSMENT — PULMONARY FUNCTION TESTS
PIF_VALUE: 19
PIF_VALUE: 30
PIF_VALUE: 28
PIF_VALUE: 29
PIF_VALUE: 23
PIF_VALUE: 26

## 2022-04-03 NOTE — PROGRESS NOTES
Shift assessment completed as documented on flowsheet. VSS Pt sedated and mechanically ventilated. Lungs diminished. SR 60's no ectopy, few junctional beats. Bill patent to bsd. Pt having liquid stools. No concerns noted a this time.

## 2022-04-03 NOTE — PROGRESS NOTES
04/03/22 1930   Vent Information   Vent Type 840   Vent Mode AC/VC   Vt Ordered 330 mL   Rate Set 23 bmp   Peak Flow 60 L/min   FiO2  40 %   SpO2 94 %   SpO2/FiO2 ratio 235   Sensitivity 3   PEEP/CPAP 5   Humidification Source Heated wire   Humidification Temp 37   Humidification Temp Measured 37   Circuit Condensation Drained   Vent Patient Data   Peak Inspiratory Pressure 28 cmH2O   Mean Airway Pressure 9.3 cmH20   Rate Measured 24 br/min   Vt Exhaled 415 mL   Minute Volume 9.77 Liters   I:E Ratio 1:3.3   Plateau Pressure 16 CTJ56   Static Compliance 38 mL/cmH2O   Dynamic Compliance 18 mL/cmH2O   Cough/Sputum   Sputum How Obtained Suctioned;Endotracheal   Sputum Amount Small   Sputum Color Cloudy   Tenacity Thick   Spontaneous Breathing Trial (SBT) RT Doc   Pulse 87   Breath Sounds   Right Upper Lobe Diminished   Right Middle Lobe Diminished   Right Lower Lobe Diminished   Left Upper Lobe Diminished   Left Lower Lobe Diminished   Additional Respiratory  Assessments   Resp 27   Position Semi-Hernadez's   Patient Observation   Observations ETT 7.5   ETT (adult)   Placement Date/Time: 04/02/22 1130   Preoxygenation: Yes  Tube Size: 7.5 mm  Laryngoscope: GlideScope  Location: Oral  Secured at: 22 cm  Placed By: Other (Comment)  Measured From: Lips   Secured at 22 cm   Measured From Lips   ET Placement Right  (From CENTER)   Secured By Commercial tube miller   Site Condition Dry

## 2022-04-03 NOTE — PROGRESS NOTES
No changes in assessment at this time. VSS Pt remains sedated RASS -2. Bill patent no concerns at this time.

## 2022-04-03 NOTE — PROGRESS NOTES
Hospitalist Progress Note      PCP: Carolina Patterson MD    Date of Admission: 3/30/2022    Chief Complaint: SOB from home via EMS. Hx of end stage COPD. Home o2 dependent - reports 2.5L baseline - increases to 3-4L when SOB     Recently treated for Cdiff diarrhea. Reports she finished Abx about a week ago - per home care notes Vanc was extended from 3/24-3/29 after the initial treatment and she finished it on 3/29. Pt reports foul smelling urine but denies dysuria. Subjective:     4/2  resp status worsened through the morning. Intubated urgently at bedside due to severe mixed resp and metabolic acidemia. 4/3  Intubated and sedated. Off levophed today. O2 sats 100% on FiO2 35% PEEP 5. Clinically appears much better and lung exam improved.                Medications:  Reviewed    Infusion Medications    dilTIAZem Stopped (04/02/22 1200)    propofol 20 mcg/kg/min (04/03/22 0800)    heparin (PORCINE) Infusion 820 Units/hr (04/03/22 0043)    norepinephrine Stopped (04/03/22 0520)    sodium chloride      dextrose      sodium chloride      sodium chloride Stopped (04/02/22 1255)     Scheduled Medications    QUEtiapine  25 mg Oral BID    metoprolol tartrate  25 mg Oral BID    piperacillin-tazobactam  3,375 mg IntraVENous Q8H    methylPREDNISolone  40 mg IntraVENous Q8H    levalbuterol  1.25 mg Nebulization 4x daily    insulin lispro  0-6 Units SubCUTAneous Q4H    fluticasone  1 spray Each Nostril Daily    vancomycin  125 mg Oral 4 times per day    atorvastatin  40 mg Oral Nightly    clopidogrel  75 mg Oral Daily    ferrous sulfate  325 mg Oral Daily with breakfast    sertraline  50 mg Oral Daily    sodium chloride flush  5-40 mL IntraVENous 2 times per day    pantoprazole  40 mg Oral Daily    predniSONE  5 mg Oral Daily with breakfast     PRN Meds: sodium chloride, heparin (porcine), heparin (porcine), fentanNYL, midazolam, levalbuterol, sodium chloride, glucose, WBCUA  03/30/2022    BACTERIA 3+ 03/30/2022    RBCUA 3-4 03/30/2022    BLOODU MODERATE 03/30/2022    SPECGRAV 1.015 03/30/2022    GLUCOSEU Negative 03/30/2022       Radiology:  XR CHEST PORTABLE   Final Result   1. Suboptimal examination due to multiple extraneous wires projecting over   the patient's chest. It appears devices are in similar position from prior. 2. Bilateral bronchial wall thickening with slightly increased   reticulonodular opacities which may reflect evolving infectious/inflammatory   airways process. XR CHEST PORTABLE   Final Result   Satisfactory line and tube placement. CT ABDOMEN PELVIS WO CONTRAST Additional Contrast? None   Final Result   Mild body wall anasarca, small pleural effusions, and small amount of   abdominal and pelvic ascites, compatible with fluid overload      Scattered areas of colonic wall thickening are seen, either due to the   partially contracted state of the colon or wall thickening from underlying   colitis      Posterior fundal fibroid versus thickening of the endometrial stripe. Recommend follow-up non urgent pelvic ultrasound for further characterization. XR CHEST PORTABLE   Final Result   Interval placement of a left IJ approach central venous catheter with the tip   overlying the mid SVC, with no pneumothorax identified.          XR CHEST PORTABLE   Final Result   Bilateral ill-defined linear pulmonary opacities could represent pulmonary   edema or infection         XR CHEST PORTABLE    (Results Pending)           Assessment/Plan:    Active Hospital Problems    Diagnosis     Atrial fibrillation with RVR ([facility]) [I48.91]     Electrolyte disorder [E87.8]     Tachycardia [R00.0]     Acute hypoxemic respiratory failure ([address] [J96.01]     Septic shock ([facility]) [A41.9, R65.21]     Bacteremia [R78.81]     Urinary tract infection without hematuria [N39.0]     Sepsis ([address] [A41.9]     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 Diarrhea - recent Cdiff infection, though Cdiff negative this admission. Bacteremia - Ecoli ([facility]) on blood cultures  And also on urine culture from 3/30  IV rocephin - to IV zosyn on 4/2  Empiric PO vanc - will need to be on this while on other Abx and for at least 10 days after due to recent Cdiff. off levophed this am.         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 Acute on chronic hypoxic resp failure. Decompensated 4/2 and intubated urgently   Solumedrol IV. Xopenex due to severe sinus tachycardia. Possibly Afib   Sinus tachy this am.   Off dilt gtt. On heparin gtt. Acute Acidemia 4/2 - much better now   pH 7.08, mixed respiratory and metabolic acidosis resulting in severe acidemia. 1 amp of bicarb   Intubated 4/2  IVF adjusted. Abx adjusted. Mood disorder on zoloft. CAD  On plavix. BB off due to septic shock. Statin on hold. Hx of GI bleed. Cont PPI. Monitor Hgb. Mod to severe protein calorie malnutrition. Supplement as tolerates. DVT Prophylaxis: heparin  Diet: Diet NPO  Code Status: Full Code      Dispo - icu. Intubated 4/2  Prognosis poor. Prior code status discussions - pt and family insist on full code. Discussed with her daughter Joellen Casey am of 4/2 - daughter reported she can not come see her as she herself just recently had surgery. She insisted mom would want to stay full code.            Atif Perez MD

## 2022-04-03 NOTE — PROGRESS NOTES
Pulmonary & Critical Care Medicine ICU Progress Note    CC: Sepsis    Events of Last 24 hours:   Intubated  for worsening respiratory status  Propofol 40 mcg/kg/min   Levophed is off   Sodium Bicarb 150 cc/hr  PEEP 5  FiO2 35%  NSR this am           Vascular lines: IV: L IJ  3/31    Mechanical ventilation     Vent Mode: AC/VC Rate Set: 23 bmp/Vt Ordered: 330 mL/ /FiO2 : 40 %  Recent Labs     22  1300 22  0600   PHART 7.225* 7.454*   SLI6EMV 43.2 40.3   PO2ART 92.9 106.2       IV:   dilTIAZem Stopped (22 1200)    IV infusion builder 150 mL/hr at 22 0618    propofol 10 mcg/kg/min (22 0613)    heparin (PORCINE) Infusion 820 Units/hr (22 0043)    norepinephrine Stopped (22 0520)    sodium chloride      dextrose      sodium chloride      sodium chloride Stopped (22 1255)       Vitals:  Blood pressure 125/68, pulse 74, temperature 97.3 °F (36.3 °C), temperature source Bladder, resp. rate 19, height 5' 4\" (1.626 m), weight 131 lb 14.4 oz (59.8 kg), SpO2 100 %, not currently breastfeeding. on AC   Temp  Av.4 °F (36.3 °C)  Min: 96.7 °F (35.9 °C)  Max: 97.9 °F (36.6 °C)    Intake/Output Summary (Last 24 hours) at 4/3/2022 0742  Last data filed at 4/3/2022 0346  Gross per 24 hour   Intake 8596.99 ml   Output 335 ml   Net 8261.99 ml     PE:  EXAM:  General: ill appearing. Intubated sedated. Eyes: PERRL. No sclera icterus. No conjunctival injection. ENT: No discharge. Pharynx clear. ET tube in place  Neck: Trachea midline. Normal thyroid. Resp: No accessory muscle use. Few crackles. No wheezing. No rhonchi. CV: Regular rate. Regular rhythm. No mumur or rub. 1+ LE edema. GI: Non-tender. Non-distended. No masses. Skin: Warm and dry. No nodule on exposed extremities. No rash on exposed extremities. Lymph: No cervical LAD. No supraclavicular LAD. M/S: No cyanosis. No joint deformity. No clubbing. Neuro: Intubated sedated.   + response to painful stimuli. Not following commands. Psych: Noncommunicative unable to obtain      Scheduled Meds:   QUEtiapine  25 mg Oral BID    metoprolol tartrate  25 mg Oral BID    piperacillin-tazobactam  3,375 mg IntraVENous Q8H    methylPREDNISolone  40 mg IntraVENous Q8H    levalbuterol  1.25 mg Nebulization 4x daily    insulin lispro  0-6 Units SubCUTAneous Q4H    fluticasone  1 spray Each Nostril Daily    vancomycin  125 mg Oral 4 times per day    atorvastatin  40 mg Oral Nightly    clopidogrel  75 mg Oral Daily    ferrous sulfate  325 mg Oral Daily with breakfast    sertraline  50 mg Oral Daily    sodium chloride flush  5-40 mL IntraVENous 2 times per day    pantoprazole  40 mg Oral Daily    predniSONE  5 mg Oral Daily with breakfast       Data:  CBC:   Recent Labs     04/01/22  0430 04/02/22  1005 04/03/22  0600   WBC 28.6* 28.6* 7.1   HGB 9.6* 10.8* 9.1*   HCT 30.1* 33.9* 27.5*   MCV 92.6 94.4 91.0    280 205     BMP:   Recent Labs     04/01/22  0430 04/02/22  0515 04/03/22  0600    144 141   K 3.2* 3.7 2.6*   * 115* 104   CO2 20* 20* 27   BUN 35* 29* 27*   CREATININE 1.6* 1.4* 1.3*     LIVER PROFILE:   No results for input(s): AST, ALT, LIPASE, BILIDIR, BILITOT, ALKPHOS in the last 72 hours. Invalid input(s): AMYLASE,  ALB    Microbiology:  3/30 UC E. Coli  3/30 BC E. Coli  3/30 COVID-19 not detected  3/31 C. difficile negative    Imaging:  Chest x-ray 4/2 imaging was reviewed by me and showed   Satisfactory ETT position  Satisfactory CVC position   Bilateral ASD - no changes        CT abdomen/Pelvis 4/2  Mild body wall anasarca, small pleural effusions, and small amount of   abdominal and pelvic ascites, compatible with fluid overload   Scattered areas of colonic wall thickening are seen, either due to the   partially contracted state of the colon or wall thickening from underlying   colitis   Posterior fundal fibroid versus thickening of the endometrial stripe. Recommend follow-up non urgent pelvic ultrasound for further characterization      Echo 8/29/2021  Normal left ventricular systolic function with ejection fraction of   55-60%. No regional wall motion abnormalites are seen. Left ventricular   diastolic filling pressure is elevated.   Compared to previous study from 9-4-2019 no changes noted in left   ventricular function.   Mild mitral regurgitation        ASSESSMENT:  · Acute hypoxemic respiratory failure  · Septic shock  · E. coli bacteremia  · E. coli UTI  · Acute kidney injury  · Electrolyte disorder  · Tachyarrythmias/A. fib with RVR  · History of history of C. difficile troponin  · COPD chronically on home O2 2.5 L and AVAPS- not in exacerbation   · Chronic anxiety with panic attacks  · CAD post CABG 2019        PLAN:  Mechanical ventilation as per my orders. The ventilator was adjusted by me at the bedside for unstable, life threatening respiratory failure  Follow ABG and chest x-ray while on the ventilator  Supplemental oxygen to maintain SaO2 >92%; wean as tolerated  IV Propofol for sedation, target RASS -2, with daily spontaneous awakening trial   Follow TG   Fentanyl and Versed PRN, gtt as needed  Head of bed 30 degrees or higher at all times  Daily spontaneous breathing trial once PEEP less than 8, FiO2 less than 55%  Inhaled bronchodilators  Home dose 5 mg prednisone   Change Bicarb drip to  cc/hr   Zosyn D#2 and Vanc  mg PO QID D#4. Completed .  Ceftriaxone D#3   IV Levophed to keep MAP > 65 mmHg or SBP>90  Follow up Cx   Electrolytes replacement   Aspirin, Plavix, beta-blocker, cardiology following   Cardiology following   Tube feed today   Blood sugar control, with goal 140-180  DVT prophylaxis:  Heparin drip  MRSA prophylaxis: Bactroban  Code status: Full code         Total critical care time caring for this patient with life threatening, unstable organ failure, including direct patient contact, management of life support systems, review of data including imaging and labs, discussions with other team members and physicians is 35 minutes, excluding procedures.

## 2022-04-03 NOTE — PROGRESS NOTES
CARDIOLOGY PROGRESS NOTE      Patient Name: Patience Velez  Date of admission: 3/30/2022  1:14 PM  Admission Dx: Septicemia ([facility] [A41.9]  Elevated troponin [R77.8]  NADJA (acute kidney injury) ([facility] [N17.9]  Sepsis ([facility] [A41.9]  Reason for Consult: AF with RVR   Requesting Physician: Jaqueline Daniel MD  Primary Care physician: Serina Burgos MD    Subjective:     Patience Velez is a [de-identified] y.o. patient with PMH notable for COPD, CAD s/p 2V CABG 9/2019 (LIMA to LAD, SVG to Diagonal), hypertension, and hyperlipidemia. Most recent Cath 9/4/2019 noted EF=35-40%; CAD involving ostial left main disease and mid LAD disease. Most recent Echo 8/31/2021 EF=55-60%. Most recent Camryn-Myoview 9/1/2021 noted EF=55% negative for ischemia. Pt was evaluated with my partner Dr. Ramon Fuentes 3/31 for elevated heart rate and troponins. Noted hypotensive on arrival. Inc oxygen requirement from [facility] ([place]). Malaise, dyspnea, palpitations endorsed. Admit EKG SR with SA; RSR' in V1 ; left axis deviation; LAFB; inferior infarct (no change 10/17). Second EKG with narrow QRS tachycardia 171bpm and 3rd EKG  with PAC's; BNP of 17,516; Gilberto 0.16 and 0.12, 0.33, 0.23; Cr 2.1; Mg+ 1.5, 2.8; COVID and flu are negative.  Blood cultures were sent.  Urine positive nitrites large leukocytes with  WBCs +3 bacteria. Note CXR suggested pulmonary edema or infection. 4/2-the patient had complained of shortness of breath, air hunger. Anxious appearing. On 3L NC. No chest pain. +palpitations. Perseverating in conversation with me. Patient decompensated later in the day requiring intubation/mechanical ventilation. She was weaned from diltiazem drip once intubated. This morning she appears comfortable on the ventilator. PEEP of 5, 35% FiO2. Imaging shows no pulmonary edema. Urine output has dropped, 335 cc yesterday if accurate. Creatinine stable. Potassium very low 2.6.   Noted to have 2 loose stools for shift today already. Remains on metoprolol with heart rates 60s. Home Medications:  Were reviewed and are listed in nursing record and/or below  Prior to Admission medications    Medication Sig Start Date End Date Taking?  Authorizing Provider   busPIRone (BUSPAR) 5 MG tablet Take 5 mg by mouth 3 times daily   Yes Historical Provider, MD   Fluticasone-Umeclidin-Vilant ([place] Pump) 200-62.5-25 MCG/INH AEPB Inhale 1 puff into the lungs daily   Yes Historical Provider, MD   albuterol (PROVENTIL) (2.5 MG/3ML) 0.083% nebulizer solution Take 2.5 mg by nebulization every 4 hours as needed for Wheezing   Yes Historical Provider, MD   sertraline (ZOLOFT) 50 MG tablet Take 50 mg by mouth daily   Yes Historical Provider, MD   predniSONE (DELTASONE) 5 MG tablet Take 5 mg by mouth daily   Yes Historical Provider, MD   guaiFENesin (MUCINEX) 600 MG extended release tablet Take 600 mg by mouth 2 times daily   Yes Historical Provider, MD   pantoprazole (PROTONIX) 40 MG tablet Take 40 mg by mouth daily   Yes Historical Provider, MD   Roflumilast (DALIRESP) 500 MCG tablet Take 500 mcg by mouth daily   Yes Historical Provider, MD   OXYGEN Inhale 2 L into the lungs 6/1/21   Historical Provider, MD   ondansetron (ZOFRAN-ODT) 4 MG disintegrating tablet Take 1 tablet by mouth every 8 hours as needed for Nausea or Vomiting 9/27/21   Marialuisa Peacock MD   furosemide (LASIX) 20 MG tablet Take 1 tablet by mouth See Admin Instructions Tuesday, friday 9/27/21   Marialuisa Peacock MD   magnesium oxide (MAG-OX) 400 (241.3 Mg) MG TABS tablet Take 1 tablet by mouth daily 9/28/21   Marialuisa Peacock MD   fluticasone (FLONASE) 50 MCG/ACT nasal spray 1 spray by Each Nostril route daily as needed for Rhinitis 9/14/21   Severa Moan, MD   ferrous sulfate (IRON 325) 325 (65 Fe) MG tablet Take 325 mg by mouth daily (with breakfast)    Historical Provider, MD   acetaminophen (TYLENOL) 500 MG tablet Take 500 mg by mouth every 6 hours as needed for Pain Historical Provider, MD   atorvastatin (LIPITOR) 20 MG tablet Take 1 tablet by mouth nightly  Patient taking differently: Take 40 mg by mouth nightly  9/24/19   Kimber Mauro MD   metoprolol tartrate (LOPRESSOR) 25 MG tablet Take 1 tablet by mouth 2 times daily 9/24/19   Yolanda Mei MD   docusate sodium (COLACE, DULCOLAX) 100 MG CAPS Take 100 mg by mouth 2 times daily  Patient taking differently: Take 100 mg by mouth 2 times daily as needed  9/24/19   Kimber Mauro MD   clopidogrel (PLAVIX) 75 MG tablet Take 1 tablet by mouth daily 9/7/19   Miya Diaz MD        CURRENT Medications:  sodium chloride (OCEAN, BABY AYR) 0.65 % nasal spray 1 spray, PRN  QUEtiapine (SEROQUEL) tablet 25 mg, BID  dilTIAZem 125 mg in dextrose 5 % 125 mL infusion, Continuous  heparin (porcine) injection 3,500 Units, PRN  heparin (porcine) injection 1,700 Units, PRN  metoprolol tartrate (LOPRESSOR) tablet 25 mg, BID  piperacillin-tazobactam (ZOSYN) 3,375 mg in sodium chloride 0.9 % 100 mL IVPB extended infusion (mini-bag), Q8H  methylPREDNISolone sodium (SOLU-MEDROL) injection 40 mg, Q8H  levalbuterol (XOPENEX) nebulizer solution 1.25 mg, 4x daily  sodium bicarbonate 150 mEq in dextrose 5 % 1,000 mL infusion, Continuous  insulin lispro (HUMALOG) injection vial 0-6 Units, Q4H  fentaNYL (SUBLIMAZE) injection 25 mcg, Q1H PRN  midazolam (VERSED) injection 2 mg, Q1H PRN  propofol injection, Continuous  levalbuterol (XOPENEX) nebulizer solution 1.25 mg, Q4H PRN  heparin 25,000 units in dextrose 5% 250 mL (premix) infusion, Continuous  fluticasone (FLONASE) 50 MCG/ACT nasal spray 1 spray, Daily  vancomycin (VANCOCIN) oral solution 125 mg, 4 times per day  norepinephrine (LEVOPHED) 16 mg in dextrose 5 % 250 mL infusion, Continuous  0.9 % sodium chloride infusion, PRN  atorvastatin (LIPITOR) tablet 40 mg, Nightly  glucose (GLUTOSE) 40 % oral gel 15 g, PRN  glucagon (rDNA) injection 1 mg, PRN  dextrose 5 % solution, PRN  dextrose bolus (hypoglycemia) 10% 125 mL, PRN   Or  dextrose bolus (hypoglycemia) 10% 250 mL, PRN  clopidogrel (PLAVIX) tablet 75 mg, Daily  ferrous sulfate (IRON 325) tablet 325 mg, Daily with breakfast  sertraline (ZOLOFT) tablet 50 mg, Daily  sodium chloride flush 0.9 % injection 5-40 mL, 2 times per day  sodium chloride flush 0.9 % injection 5-40 mL, PRN  0.9 % sodium chloride infusion, PRN  ondansetron (ZOFRAN-ODT) disintegrating tablet 4 mg, Q8H PRN   Or  ondansetron (ZOFRAN) injection 4 mg, Q6H PRN  polyethylene glycol (GLYCOLAX) packet 17 g, Daily PRN  acetaminophen (TYLENOL) tablet 650 mg, Q6H PRN   Or  acetaminophen (TYLENOL) suppository 650 mg, Q6H PRN  0.9 % sodium chloride infusion, Continuous  pantoprazole (PROTONIX) tablet 40 mg, Daily  predniSONE (DELTASONE) tablet 5 mg, Daily with breakfast        Allergies:  Latex and Codeine     Review of Systems:   A 14 point review of symptoms completed. Pertinent positives identified in the HPI, all other review of symptoms negative. Objective:     Vitals:    04/03/22 0600 04/03/22 0700 04/03/22 0800 04/03/22 0829   BP: 135/74 125/68 (!) 141/77    Pulse: 67 74 76    Resp: 20 19 22    Temp:  97.3 °F (36.3 °C)     TempSrc:  Bladder     SpO2:  100% 100% 100%   Weight: 131 lb 14.4 oz (59.8 kg)      Height:          Weight: 131 lb 14.4 oz (59.8 kg)       PHYSICAL EXAM:    General:   Intubated/sedated   Head:  Normocephalic, atraumatic   Eyes:  Conjunctiva/corneas clear, anicteric sclerae    Nose: Nares normal, no drainage or sinus tenderness   Throat:  ET tube midline   Neck: Trachea midline. Neck supple with no lymphadenopathy, thyroid not enlarged, symmetric, no tenderness/mass/nodules    Lungs:    Lung fields on anterior auscultation only, diminished at bases   Chest Wall:  No deformity or tenderness to palpation   Heart:   Regular rate and rhythm, normal S1, normal S2, no murmur, no rub, no S3/S4, PMI non-palpable.     Abdomen:   Soft, non-tender, with normoactive bowel sounds. No masses, no hepatosplenomegaly   Extremities: No cyanosis, clubbing or pitting edema. Vascular: 2+ radial,2+ dorsalis pedis and posterior tibial pulses bilaterally. Brisk carotid upstrokes without carotid bruit. Skin: Skin color, texture, turgor are normal with no rashes or ulceration.     Pysch:  Cannot assess   Neurologic:  Cannot assess        Labs:   CBC:   Lab Results   Component Value Date    WBC 7.1 04/03/2022    RBC 3.02 04/03/2022    HGB 9.1 04/03/2022    HCT 27.5 04/03/2022    MCV 91.0 04/03/2022    RDW 13.7 04/03/2022     04/03/2022     CMP:  Lab Results   Component Value Date     04/03/2022    K 2.6 04/03/2022    K 3.1 03/31/2022     04/03/2022    CO2 27 04/03/2022    BUN 27 04/03/2022    CREATININE 1.3 04/03/2022    GFRAA 48 04/03/2022    GFRAA >60 05/08/2013    AGRATIO 1.1 03/31/2022    LABGLOM 39 04/03/2022    GLUCOSE 186 04/03/2022    PROT 4.9 03/31/2022    PROT 7.81 12/20/2012    CALCIUM 8.1 04/03/2022    BILITOT 0.3 03/31/2022    ALKPHOS 81 03/31/2022    AST 16 03/31/2022    ALT 6 03/31/2022     PT/INR:  No results found for: PTINR  HgBA1c:  Lab Results   Component Value Date    LABA1C 5.4 01/10/2022     Lab Results   Component Value Date    CKTOTAL 96 11/03/2015    TROPONINI 0.11 (H) 04/02/2022         Interval Testing/Data:     Telemetry personally reviewed: Sinus bradycardia, PACs, heart rate 60s    EKG 3/30/2022  Normal sinus rhythm with sinus arrhythmiaRSR' or QR pattern in V1 suggests right ventricular conduction delayBaseline artifactLeft axis deviationLeft anterior fascicular blockInferior infarct , age undeterminedAbnormal ECGNo previous ECGs availableConfirmed by TOR Hardy MD ([id number]) on 3/30/2022 5:47:16 PM      EKG 3/230/22   Narrow QRS tachycardia ; undeterminedLeft axis deviationLeft anterior fascicular blockIncomplete right bundle branch blockPossible Anteroseptal infarct , age undeterminedAbnormal ECGWhen compared with ECG of 30-MAR-2022 15: 50,Vent. rate has increased BY  81 BPMConfirmed by Tillie Frankel MD, STEPHEN ([id number]) on 3/31/2022 7:43:06 AM      EKG 3/31/2022  Sinus tachycardia with Premature supraventricular complexesLeft axis deviationLeft anterior fascicular blockNon-specific intra-ventricular conduction delayNonspecific ST abnormalityAbnormal ECGWhen compared with ECG of 30-MAR-2022 18:10, (unconfirmed)Vent. rate has decreased BY  69 BPMConfirmed by Tillie Frankel MD, STEPHEN ([id number]) on 3/31/2022 7:43:50 AM      Camryn-Myoview 9/1/2021  Summary  Normal LV function. [place] is uniform isotope uptake at stress and rest. There is no evidence of  myocardial ischemia.         TTE September 2019:   Summary   Left ventricular cavity size is normal. Normal left ventricular wall   thickness. Overall left ventricular systolic function appears low normal   with an estimated ejection fraction of 50%. No regional wall motion   abnormalities are noted. Diastolic function is indeterminate.   Mild mitral regurgitation is present.   The right ventricle is normal in size and function.     [facility] 9/2019  LEFT MAIN:  Left main does have ostial disease of approximately 70%.  There is catheter dampening upon engagement of the left main.     LEFT ANTERIOR DESCENDING:  The LAD course to and wraps around the apex. Gives off a large diagonal branch with multiple terminal divisions.  Just at the diagonal branch, it was free of significant obstructive disease.  The LAD does continue, and just after the large diagonal branch, has 90% stenosis in the LAD proper.     LEFT CIRCUMFLEX:  Circumflex is small nondominant vessel.  It consists  essentially of a single marginal branch.  It is free of significant  obstructive disease.     RIGHT CORONARY ARTERY:  Right coronary artery is very large dominant  vessel.  It gives off a large PDA and two large posterolateral branches.   Right coronary artery is free of significant obstructive disease.     IMPRESSION:  1.  LV dysfunction with estimated ejection fraction of 35 to 40% with  inferior hypo to akinesis. 2.  Coronary artery disease as described above including ostial left main disease (with catheter dampening upon engagement) and mid LAD  disease. 3.  Recommend surgical consultation. 4.  Successful Angio-Seal of right femoral arteriotomy site. 5.  Estimated blood loss less than 5-10cc. Impression and Plan      1. Palpitations, SVT, AF with RVR  2. Severe Sepsis with UTI, e coli bacteremia   3. Acute on chronic hypoxic resp failure requiring mechanical ventilation  4. NADJA, improving, evidence of UR with bond placed. 5. CAD status post 2-v CABG 9/19  6. Elevated troponin thought 2/2 sepsis/hypotension/elevated HR amidst underlying CAD  7. EF 50%. 8. HLD  9. COPD, end stage, on 2L Home O2  10. Diarrhea, recent C diff infection  11. Hypokalemia      [id number] Score for Atrial Fibrillation Stroke Risk   Risk   Factors  Component Value   C CHF Yes 1   H HTN Yes 1   A2 Age >= 76 Yes,  [de-identified] y.o.) 2   D DM No 0   S2 Prior Stroke/TIA No 0   V Vascular Disease Yes 1   A Age 74-69 No,  [de-identified] y.o.) 0   Sc Sex female 1    [id number]  Score  6   Score last updated 4/2/22 9:16 PM EDT    Click here for a link to the UpToDate guideline \"Atrial Fibrillation: Anticoagulation therapy to prevent embolization    Disclaimer: Risk Score calculation is dependent on accuracy of patient problem list and past encounter diagnosis.     Patient Active Problem List   Diagnosis    Hyperlipidemia    Asthma    OA (osteoarthritis)    Tobacco use    COPD exacerbation (HCC)    Septicemia ([address]    Abnormal chest x-ray    COPD with acute exacerbation ([address]    Shortness of breath    Tobacco abuse    Moderate malnutrition ([address]    NSTEMI (non-ST elevated myocardial infarction) (HCC)    Elevated troponin    Acute respiratory failure with hypoxia (HCC)    CAD (coronary artery disease)    Acute pulmonary edema (HCC)    Pulmonary infiltrate    Elevated brain natriuretic peptide (BNP) level    Leukocytosis    Ischemic cardiomyopathy    Acute combined systolic and diastolic congestive heart failure (HCC)    Hypernatremia    NADJA (acute kidney injury) ([address]    Status post aorto-coronary artery bypass graft    Mucus plugging of bronchi    CAD in native artery    S/P CABG x 2    Pneumonia of both lower lobes due to methicillin resistant Staphylococcus aureus (MRSA) (HCC)    GI bleed    Severe malnutrition (HCC)    Chest pain    Chronic respiratory failure with hypoxia (HCC)    Primary hypertension    Anemia    Acute respiratory failure (HCC)    Acute respiratory distress    Volume overload    Demand ischemia (HCC)    GERD (gastroesophageal reflux disease)    Acute respiratory failure with hypoxia and hypercapnia (HCC)    Shock (HCC)    Pneumonia due to infectious organism    Acute on chronic respiratory failure with hypoxia and hypercapnia (HCC)    Chronic obstructive pulmonary disease (HCC)    Acute on chronic respiratory failure with hypoxemia (HCC)    Chronic anxiety    Congestive heart failure (HCC)    Acute respiratory failure with hypoxia and hypercarbia (HCC)    Acute kidney injury (HCC)    Elevated procalcitonin    COPD, severe (HCC)    Anxiety    Severe anxiety    Severe protein-calorie malnutrition (HCC)    HCAP (healthcare-associated pneumonia)    Pleural effusion    Hyperglycemia    Sepsis (HCC)    Tachycardia    Acute hypoxemic respiratory failure (HCC)    Septic shock (HCC)    Bacteremia    Urinary tract infection without hematuria    Atrial fibrillation with RVR (HCC)    Electrolyte disorder       PLAN:  1. Continue metoprolol 25 mg BID. If heart rates drop to 40s persistently may reduce dose but would not entirely discontinue. 2.C/w plavix/BB for CAD  3. Placed on heparin gtt for CVA prophylaxis - eventual transition to oral AC upon clinical improvement.   4. Treatment of sepsis/Acute resp failure/COPD per IM/crit care services. Do not feel her to have significant component of volume overload    Guarded prognosis. Cardiology service will sign off at this time. If she recovers and is to be extubated and has recurrence of tachycardia complicating extubation plans, please reconsult. Follow up with DR. Xie's office on NH. I will address the patient's cardiac risk factors and adjusted pharmacologic treatment as needed. In addition, I have reinforced the need for patient directed risk factor modification. All questions and concerns were addressed to the patient/family. Alternatives to my treatment were discussed. Thank you for allowing us to participate in the care of [employer]. Please call me with any questions [phone].     Abbie Au MD, [facility]  Cardiovascular Disease  [facility]  [phone] [address] [facility]  [phone] [address] [place] [address]  4/3/2022 9:58 AM

## 2022-04-03 NOTE — PROGRESS NOTES
RT Inhaler-Nebulizer Bronchodilator Protocol Note    There is a bronchodilator order in the chart from a provider indicating to follow the RT Bronchodilator Protocol and there is an Initiate RT Inhaler-Nebulizer Bronchodilator Protocol order as well (see protocol at bottom of note). CXR Findings:  XR CHEST PORTABLE    Result Date: 4/2/2022  Satisfactory line and tube placement. The findings from the last RT Protocol Assessment were as follows:   History Pulmonary Disease: Chronic pulmonary disease  Respiratory Pattern: Mild dyspnea at rest, irregular pattern, or RR 21-25 bpm  Breath Sounds: Slightly diminished and/or crackles  Cough: Weak, non-productive  Indication for Bronchodilator Therapy: Decreased or absent breath sounds  Bronchodilator Assessment Score: 11    Aerosolized bronchodilator medication orders have been revised according to the RT Inhaler-Nebulizer Bronchodilator Protocol below. Respiratory Therapist to perform RT Therapy Protocol Assessment initially then follow the protocol. Repeat RT Therapy Protocol Assessment PRN for score 0-3 or on second treatment, BID, and PRN for scores above 3. No Indications - adjust the frequency to every 6 hours PRN wheezing or bronchospasm, if no treatments needed after 48 hours then discontinue using Per Protocol order mode. If indication present, adjust the RT bronchodilator orders based on the Bronchodilator Assessment Score as indicated below. Use Inhaler orders unless patient has one or more of the following: on home nebulizer, not able to hold breath for 10 seconds, is not alert and oriented, cannot activate and use MDI correctly, or respiratory rate 25 breaths per minute or more, then use the equivalent nebulizer order(s) with same Frequency and PRN reasons based on the score. If a patient is on this medication at home then do not decrease Frequency below that used at home.     0-3 - enter or revise RT bronchodilator order(s) to equivalent RT Bronchodilator order with Frequency of every 4 hours PRN for wheezing or increased work of breathing using Per Protocol order mode. 4-6 - enter or revise RT Bronchodilator order(s) to two equivalent RT bronchodilator orders with one order with BID Frequency and one order with Frequency of every 4 hours PRN wheezing or increased work of breathing using Per Protocol order mode. 7-10 - enter or revise RT Bronchodilator order(s) to two equivalent RT bronchodilator orders with one order with TID Frequency and one order with Frequency of every 4 hours PRN wheezing or increased work of breathing using Per Protocol order mode. 11-13 - enter or revise RT Bronchodilator order(s) to one equivalent RT bronchodilator order with QID Frequency and an Albuterol order with Frequency of every 4 hours PRN wheezing or increased work of breathing using Per Protocol order mode. Greater than 13 - enter or revise RT Bronchodilator order(s) to one equivalent RT bronchodilator order with every 4 hours Frequency and an Albuterol order with Frequency of every 2 hours PRN wheezing or increased work of breathing using Per Protocol order mode.        Electronically signed by Edelmira Vela RCP on 4/2/2022 at 8:53 PM

## 2022-04-03 NOTE — PROGRESS NOTES
Low dose Heparin GTT:  Anti-Xa at 16:00 check is therapeutic at 0.31. No changes, continue infusion at 820 units/hr (8.2mL/hr). This the second consecutive therapeutic Anti-Xa. Anti-Xa now ordered daily.   Desired range is 0.3-0.7IU/mL  Chiquita Reed R.Ph.4/3/29127:11 PM

## 2022-04-03 NOTE — PROGRESS NOTES
Reassessment completed, no changes noted. No signs of distress noted at this time. SR without ectopy.

## 2022-04-03 NOTE — PROGRESS NOTES
04/03/22 0341   Vent Information   Vent Type 840   Vent Mode AC/VC   Vt Ordered 330 mL   Rate Set 23 bmp   Peak Flow 60 L/min   FiO2  40 %   Sensitivity 3   PEEP/CPAP 5   Vent Patient Data   Peak Inspiratory Pressure 23 cmH2O   Mean Airway Pressure 9.2 cmH20   Rate Measured 23 br/min   Vt Exhaled 447 mL   Minute Volume 9.21 Liters   I:E Ratio 1:3.2   Cough/Sputum   Sputum How Obtained Suctioned;Endotracheal   Sputum Amount None   Spontaneous Breathing Trial (SBT) RT Doc   Pulse 69   Breath Sounds   Right Upper Lobe Expiratory Wheezes   Right Middle Lobe Expiratory Wheezes   Right Lower Lobe Diminished   Left Upper Lobe Expiratory Wheezes   Left Lower Lobe Diminished   Additional Respiratory  Assessments   Resp 24   Position Semi-Hernadez's   ETT (adult)   Placement Date/Time: 04/02/22 1130   Preoxygenation: Yes  Tube Size: 7.5 mm  Laryngoscope: GlideScope  Location: Oral  Secured at: 22 cm  Placed By: Other (Comment)  Measured From: Lips   ET Placement Right

## 2022-04-03 NOTE — PROGRESS NOTES
04/02/22 2342   Vent Information   Vent Type 840   Vent Mode AC/VC   Vt Ordered 330 mL   Rate Set 23 bmp   Peak Flow 60 L/min   FiO2  40 %   SpO2 100 %   SpO2/FiO2 ratio 250   Sensitivity 3   PEEP/CPAP 5   Vent Patient Data   Peak Inspiratory Pressure 21 cmH2O   Mean Airway Pressure 8.9 cmH20   Rate Measured 24 br/min   Vt Exhaled 368 mL   Minute Volume 9.73 Liters   I:E Ratio 1:3.2   Cough/Sputum   Sputum How Obtained Suctioned;Endotracheal   Sputum Amount None   Spontaneous Breathing Trial (SBT) RT Doc   Pulse 76   Breath Sounds   Right Upper Lobe Diminished   Right Middle Lobe Diminished   Right Lower Lobe Diminished   Left Upper Lobe Diminished   Left Lower Lobe Diminished   Additional Respiratory  Assessments   Resp 20   Position Semi-Hernadez's   ETT (adult)   Placement Date/Time: 04/02/22 1130   Preoxygenation: Yes  Tube Size: 7.5 mm  Laryngoscope: GlideScope  Location: Oral  Secured at: 22 cm  Placed By: Other (Comment)  Measured From: [address]

## 2022-04-03 NOTE — PROGRESS NOTES
Pharmacy - RE:  Low-dose Heparin drip  Current rate = 7 ml/h  (700 units/h)  Anti-Xa drawn @ 2300 = 0.18. Goal anti-Xa = 0.3 - 0.7  Per protocol, give a 1700 unit bolus and increase drip rate to 8.2 ml/h (820 units/h). Obtain another anti-Xa 4/3 @ 0800.

## 2022-04-03 NOTE — PROGRESS NOTES
Patient waking and mildly restless. She is incontinent of loose, green, mucus stool. Medicated with versed prior to performing olivier care for patient comfort and assist to meet RASS goal -2 sedation. She tolerated repositioning for olivier care well. Repositioned up in bed with pillow support. Continued use of bilateral soft wrist restraints for patient safety of prevention of pulling on lines or tubing.     Electronically signed by Vesta Arteaga RN on 4/3/2022 at 7:40 AM

## 2022-04-04 ENCOUNTER — APPOINTMENT (OUTPATIENT)
Dept: GENERAL RADIOLOGY | Age: 82
DRG: 871 | End: 2022-04-04
Payer: MEDICARE

## 2022-04-04 LAB
ANION GAP SERPL CALCULATED.3IONS-SCNC: 8 MMOL/L (ref 3–16)
ANTI-XA UNFRAC HEPARIN: 0.22 IU/ML (ref 0.3–0.7)
ANTI-XA UNFRAC HEPARIN: 0.31 IU/ML (ref 0.3–0.7)
ANTI-XA UNFRAC HEPARIN: 0.32 IU/ML (ref 0.3–0.7)
ANTI-XA UNFRAC HEPARIN: 0.42 IU/ML (ref 0.3–0.7)
BASE EXCESS ARTERIAL: 3.3 MMOL/L (ref -3–3)
BASE EXCESS ARTERIAL: 5.5 MMOL/L (ref -3–3)
BASOPHILS ABSOLUTE: 0 K/UL (ref 0–0.2)
BASOPHILS RELATIVE PERCENT: 0.1 %
BUN BLDV-MCNC: 27 MG/DL (ref 7–20)
CALCIUM SERPL-MCNC: 8.5 MG/DL (ref 8.3–10.6)
CARBOXYHEMOGLOBIN ARTERIAL: 0.2 % (ref 0–1.5)
CARBOXYHEMOGLOBIN ARTERIAL: 0.3 % (ref 0–1.5)
CHLORIDE BLD-SCNC: 109 MMOL/L (ref 99–110)
CO2: 29 MMOL/L (ref 21–32)
CREAT SERPL-MCNC: 1.2 MG/DL (ref 0.6–1.2)
EOSINOPHILS ABSOLUTE: 0 K/UL (ref 0–0.6)
EOSINOPHILS RELATIVE PERCENT: 0 %
GFR AFRICAN AMERICAN: 52
GFR NON-AFRICAN AMERICAN: 43
GLUCOSE BLD-MCNC: 106 MG/DL (ref 70–99)
GLUCOSE BLD-MCNC: 120 MG/DL (ref 70–99)
GLUCOSE BLD-MCNC: 124 MG/DL (ref 70–99)
GLUCOSE BLD-MCNC: 130 MG/DL (ref 70–99)
GLUCOSE BLD-MCNC: 136 MG/DL (ref 70–99)
GLUCOSE BLD-MCNC: 142 MG/DL (ref 70–99)
GLUCOSE BLD-MCNC: 81 MG/DL (ref 70–99)
GLUCOSE BLD-MCNC: 86 MG/DL (ref 70–99)
HCO3 ARTERIAL: 26.3 MMOL/L (ref 21–29)
HCO3 ARTERIAL: 29.2 MMOL/L (ref 21–29)
HCT VFR BLD CALC: 28.3 % (ref 36–48)
HEMOGLOBIN, ART, EXTENDED: 10.4 G/DL (ref 12–16)
HEMOGLOBIN, ART, EXTENDED: 10.8 G/DL (ref 12–16)
HEMOGLOBIN: 9.3 G/DL (ref 12–16)
LYMPHOCYTES ABSOLUTE: 0.2 K/UL (ref 1–5.1)
LYMPHOCYTES RELATIVE PERCENT: 2.8 %
MAGNESIUM: 2 MG/DL (ref 1.8–2.4)
MCH RBC QN AUTO: 29.8 PG (ref 26–34)
MCHC RBC AUTO-ENTMCNC: 33 G/DL (ref 31–36)
MCV RBC AUTO: 90.3 FL (ref 80–100)
METHEMOGLOBIN ARTERIAL: 0.1 %
METHEMOGLOBIN ARTERIAL: 0.1 %
MONOCYTES ABSOLUTE: 0.1 K/UL (ref 0–1.3)
MONOCYTES RELATIVE PERCENT: 1.7 %
NEUTROPHILS ABSOLUTE: 8.2 K/UL (ref 1.7–7.7)
NEUTROPHILS RELATIVE PERCENT: 95.4 %
O2 SAT, ARTERIAL: 97.7 %
O2 SAT, ARTERIAL: 98.9 %
O2 THERAPY: ABNORMAL
O2 THERAPY: ABNORMAL
PCO2 ARTERIAL: 34.4 MMHG (ref 35–45)
PCO2 ARTERIAL: 39 MMHG (ref 35–45)
PDW BLD-RTO: 13.6 % (ref 12.4–15.4)
PERFORMED ON: ABNORMAL
PERFORMED ON: NORMAL
PERFORMED ON: NORMAL
PH ARTERIAL: 7.49 (ref 7.35–7.45)
PH ARTERIAL: 7.5 (ref 7.35–7.45)
PLATELET # BLD: 198 K/UL (ref 135–450)
PMV BLD AUTO: 8.6 FL (ref 5–10.5)
PO2 ARTERIAL: 134.3 MMHG (ref 75–108)
PO2 ARTERIAL: 94.2 MMHG (ref 75–108)
POTASSIUM SERPL-SCNC: 2.9 MMOL/L (ref 3.5–5.1)
RBC # BLD: 3.13 M/UL (ref 4–5.2)
SODIUM BLD-SCNC: 146 MMOL/L (ref 136–145)
TCO2 ARTERIAL: 27.4 MMOL/L
TCO2 ARTERIAL: 30.4 MMOL/L
WBC # BLD: 8.6 K/UL (ref 4–11)

## 2022-04-04 PROCEDURE — 85025 COMPLETE CBC W/AUTO DIFF WBC: CPT

## 2022-04-04 PROCEDURE — 6360000002 HC RX W HCPCS: Performed by: INTERNAL MEDICINE

## 2022-04-04 PROCEDURE — 80048 BASIC METABOLIC PNL TOTAL CA: CPT

## 2022-04-04 PROCEDURE — 2000000000 HC ICU R&B

## 2022-04-04 PROCEDURE — 94660 CPAP INITIATION&MGMT: CPT

## 2022-04-04 PROCEDURE — 99233 SBSQ HOSP IP/OBS HIGH 50: CPT | Performed by: INTERNAL MEDICINE

## 2022-04-04 PROCEDURE — 92526 ORAL FUNCTION THERAPY: CPT

## 2022-04-04 PROCEDURE — 99291 CRITICAL CARE FIRST HOUR: CPT | Performed by: INTERNAL MEDICINE

## 2022-04-04 PROCEDURE — 94761 N-INVAS EAR/PLS OXIMETRY MLT: CPT

## 2022-04-04 PROCEDURE — 71045 X-RAY EXAM CHEST 1 VIEW: CPT

## 2022-04-04 PROCEDURE — 94640 AIRWAY INHALATION TREATMENT: CPT

## 2022-04-04 PROCEDURE — 83735 ASSAY OF MAGNESIUM: CPT

## 2022-04-04 PROCEDURE — 6370000000 HC RX 637 (ALT 250 FOR IP): Performed by: INTERNAL MEDICINE

## 2022-04-04 PROCEDURE — 94003 VENT MGMT INPAT SUBQ DAY: CPT

## 2022-04-04 PROCEDURE — 92610 EVALUATE SWALLOWING FUNCTION: CPT

## 2022-04-04 PROCEDURE — 82803 BLOOD GASES ANY COMBINATION: CPT

## 2022-04-04 PROCEDURE — 2700000000 HC OXYGEN THERAPY PER DAY

## 2022-04-04 PROCEDURE — 2580000003 HC RX 258: Performed by: INTERNAL MEDICINE

## 2022-04-04 PROCEDURE — 36415 COLL VENOUS BLD VENIPUNCTURE: CPT

## 2022-04-04 PROCEDURE — 2580000003 HC RX 258: Performed by: NURSE PRACTITIONER

## 2022-04-04 PROCEDURE — 6370000000 HC RX 637 (ALT 250 FOR IP): Performed by: NURSE PRACTITIONER

## 2022-04-04 PROCEDURE — 85520 HEPARIN ASSAY: CPT

## 2022-04-04 RX ORDER — MAGNESIUM SULFATE 1 G/100ML
1000 INJECTION INTRAVENOUS PRN
Status: DISCONTINUED | OUTPATIENT
Start: 2022-04-04 | End: 2022-04-09 | Stop reason: HOSPADM

## 2022-04-04 RX ORDER — SODIUM CHLORIDE, SODIUM LACTATE, POTASSIUM CHLORIDE, CALCIUM CHLORIDE 600; 310; 30; 20 MG/100ML; MG/100ML; MG/100ML; MG/100ML
INJECTION, SOLUTION INTRAVENOUS CONTINUOUS
Status: DISCONTINUED | OUTPATIENT
Start: 2022-04-04 | End: 2022-04-05 | Stop reason: SINTOL

## 2022-04-04 RX ORDER — POTASSIUM CHLORIDE 29.8 MG/ML
20 INJECTION INTRAVENOUS PRN
Status: DISCONTINUED | OUTPATIENT
Start: 2022-04-04 | End: 2022-04-09 | Stop reason: HOSPADM

## 2022-04-04 RX ORDER — HEPARIN SODIUM 1000 [USP'U]/ML
1700 INJECTION, SOLUTION INTRAVENOUS; SUBCUTANEOUS ONCE
Status: COMPLETED | OUTPATIENT
Start: 2022-04-04 | End: 2022-04-04

## 2022-04-04 RX ADMIN — Medication 125 MG: at 22:34

## 2022-04-04 RX ADMIN — Medication 125 MG: at 12:25

## 2022-04-04 RX ADMIN — LEVALBUTEROL 1.25 MG: 1.25 SOLUTION, CONCENTRATE RESPIRATORY (INHALATION) at 11:54

## 2022-04-04 RX ADMIN — ATORVASTATIN CALCIUM 40 MG: 40 TABLET, FILM COATED ORAL at 20:11

## 2022-04-04 RX ADMIN — METOPROLOL TARTRATE 25 MG: 25 TABLET, FILM COATED ORAL at 20:11

## 2022-04-04 RX ADMIN — PIPERACILLIN AND TAZOBACTAM 3375 MG: 3; .375 INJECTION, POWDER, FOR SOLUTION INTRAVENOUS at 19:49

## 2022-04-04 RX ADMIN — METHYLPREDNISOLONE SODIUM SUCCINATE 40 MG: 40 INJECTION, POWDER, FOR SOLUTION INTRAMUSCULAR; INTRAVENOUS at 01:46

## 2022-04-04 RX ADMIN — CLOPIDOGREL BISULFATE 75 MG: 75 TABLET ORAL at 08:13

## 2022-04-04 RX ADMIN — HEPARIN SODIUM 940 UNITS/HR: 10000 INJECTION, SOLUTION INTRAVENOUS; SUBCUTANEOUS at 23:42

## 2022-04-04 RX ADMIN — PREDNISONE 5 MG: 5 TABLET ORAL at 08:13

## 2022-04-04 RX ADMIN — LEVALBUTEROL 1.25 MG: 1.25 SOLUTION, CONCENTRATE RESPIRATORY (INHALATION) at 18:54

## 2022-04-04 RX ADMIN — Medication 125 MG: at 17:06

## 2022-04-04 RX ADMIN — METOPROLOL TARTRATE 25 MG: 25 TABLET, FILM COATED ORAL at 08:13

## 2022-04-04 RX ADMIN — SODIUM CHLORIDE, PRESERVATIVE FREE 10 ML: 5 INJECTION INTRAVENOUS at 20:10

## 2022-04-04 RX ADMIN — QUETIAPINE FUMARATE 25 MG: 25 TABLET ORAL at 08:13

## 2022-04-04 RX ADMIN — METHYLPREDNISOLONE SODIUM SUCCINATE 40 MG: 40 INJECTION, POWDER, FOR SOLUTION INTRAMUSCULAR; INTRAVENOUS at 17:06

## 2022-04-04 RX ADMIN — SODIUM CHLORIDE, POTASSIUM CHLORIDE, SODIUM LACTATE AND CALCIUM CHLORIDE: 600; 310; 30; 20 INJECTION, SOLUTION INTRAVENOUS at 20:12

## 2022-04-04 RX ADMIN — QUETIAPINE FUMARATE 25 MG: 25 TABLET ORAL at 20:11

## 2022-04-04 RX ADMIN — HEPARIN SODIUM 1700 UNITS: 1000 INJECTION INTRAVENOUS; SUBCUTANEOUS at 05:24

## 2022-04-04 RX ADMIN — POTASSIUM CHLORIDE 20 MEQ: 400 INJECTION, SOLUTION INTRAVENOUS at 05:56

## 2022-04-04 RX ADMIN — FERROUS SULFATE TAB 325 MG (65 MG ELEMENTAL FE) 325 MG: 325 (65 FE) TAB at 08:13

## 2022-04-04 RX ADMIN — PROPOFOL 25 MCG/KG/MIN: 10 INJECTION, EMULSION INTRAVENOUS at 01:38

## 2022-04-04 RX ADMIN — SERTRALINE HYDROCHLORIDE 50 MG: 50 TABLET ORAL at 08:13

## 2022-04-04 RX ADMIN — SODIUM CHLORIDE, POTASSIUM CHLORIDE, SODIUM LACTATE AND CALCIUM CHLORIDE: 600; 310; 30; 20 INJECTION, SOLUTION INTRAVENOUS at 10:29

## 2022-04-04 RX ADMIN — POTASSIUM CHLORIDE 20 MEQ: 400 INJECTION, SOLUTION INTRAVENOUS at 09:25

## 2022-04-04 RX ADMIN — POTASSIUM CHLORIDE 20 MEQ: 400 INJECTION, SOLUTION INTRAVENOUS at 08:17

## 2022-04-04 RX ADMIN — PIPERACILLIN AND TAZOBACTAM 3375 MG: 3; .375 INJECTION, POWDER, FOR SOLUTION INTRAVENOUS at 11:25

## 2022-04-04 RX ADMIN — FLUTICASONE PROPIONATE 1 SPRAY: 50 SPRAY, METERED NASAL at 20:21

## 2022-04-04 RX ADMIN — SODIUM CHLORIDE: 9 INJECTION, SOLUTION INTRAVENOUS at 01:45

## 2022-04-04 RX ADMIN — Medication 125 MG: at 05:24

## 2022-04-04 RX ADMIN — LEVALBUTEROL 1.25 MG: 1.25 SOLUTION, CONCENTRATE RESPIRATORY (INHALATION) at 08:21

## 2022-04-04 RX ADMIN — METHYLPREDNISOLONE SODIUM SUCCINATE 40 MG: 40 INJECTION, POWDER, FOR SOLUTION INTRAMUSCULAR; INTRAVENOUS at 08:13

## 2022-04-04 RX ADMIN — PIPERACILLIN AND TAZOBACTAM 3375 MG: 3; .375 INJECTION, POWDER, FOR SOLUTION INTRAVENOUS at 04:53

## 2022-04-04 RX ADMIN — Medication 125 MG: at 00:13

## 2022-04-04 RX ADMIN — LEVALBUTEROL 1.25 MG: 1.25 SOLUTION, CONCENTRATE RESPIRATORY (INHALATION) at 15:30

## 2022-04-04 RX ADMIN — SODIUM CHLORIDE, PRESERVATIVE FREE 10 ML: 5 INJECTION INTRAVENOUS at 08:14

## 2022-04-04 ASSESSMENT — PAIN SCALES - GENERAL
PAINLEVEL_OUTOF10: 0

## 2022-04-04 ASSESSMENT — PULMONARY FUNCTION TESTS
PIF_VALUE: 7.3
PIF_VALUE: 11
PIF_VALUE: 12

## 2022-04-04 NOTE — PLAN OF CARE
Problem: Nutrition  Intervention: Swallowing evaluation  Note: SLP completed evaluation. Please refer to notes in EMR. Intervention: Aspiration precautions  Note: SLP completed evaluation. Please refer to notes in EMR.     Ninfa Paris M.A. [facility] #[id number]  Speech-Language Pathologist

## 2022-04-04 NOTE — PROGRESS NOTES
AM labs drawn from CVC without difficulty. Heparin drip stopped 5 minutes prior to draw. Christian's test shows adequate collateral circulation. Skin was prepped with Alcohol. 1-2 mL's of blood withdrawn from Right Radial and sent for analysis. No bleeding or hematoma at site Pressure to site x 5 minutes and bandage applied. Tolerated well. Lung sounds clear and diminished. VS stable, call light in reach.

## 2022-04-04 NOTE — PROGRESS NOTES
Pulmonary & Critical Care Medicine ICU Progress Note    CC: Sepsis    Events of Last 24 hours:     Propofol       Vascular lines: IV: L IJ  3/31    Mechanical ventilation 4/2    Vent Mode: AC/VC Rate Set: 23 bmp/Vt Ordered: 330 mL/ /FiO2 : 35 %  Recent Labs     04/03/22  0600 04/04/22 0455   PHART 7.454* 7.492*   CBY8WQW 40.3 39.0   PO2ART 106.2 94.2       IV:   dilTIAZem Stopped (04/02/22 1200)    propofol 25 mcg/kg/min (04/04/22 0438)    heparin (PORCINE) Infusion 940 Units/hr (04/04/22 0526)    norepinephrine Stopped (04/03/22 0520)    sodium chloride      dextrose      sodium chloride      sodium chloride 100 mL/hr at 04/04/22 0438       Vitals:  Blood pressure 139/74, pulse 78, temperature 98.4 °F (36.9 °C), temperature source Bladder, resp. rate 23, height 5' 4\" (1.626 m), weight 127 lb 14.4 oz (58 kg), SpO2 100 %, not currently breastfeeding. on vent  General: ill appearing. Intubated sedated. Eyes: PERRL. No sclera icterus. No conjunctival injection. ENT: No discharge. Pharynx clear. ET tube in place  Neck: Trachea midline. Normal thyroid. Resp: No accessory muscle use. Few crackles. No wheezing. No rhonchi. CV: Regular rate. Regular rhythm. No mumur or rub. 1+ LE edema. GI: Non-tender. Non-distended. No masses. Skin: Warm and dry. No nodule on exposed extremities. No rash on exposed extremities. Neuro: Intubated sedated. + response to painful stimuli. Not following commands.   Psych: Noncommunicative unable to obtain      Scheduled Meds:   QUEtiapine  25 mg Oral BID    metoprolol tartrate  25 mg Oral BID    piperacillin-tazobactam  3,375 mg IntraVENous Q8H    methylPREDNISolone  40 mg IntraVENous Q8H    levalbuterol  1.25 mg Nebulization 4x daily    insulin lispro  0-6 Units SubCUTAneous Q4H    fluticasone  1 spray Each Nostril Daily    vancomycin  125 mg Oral 4 times per day    atorvastatin  40 mg Oral Nightly    clopidogrel  75 mg Oral Daily    ferrous sulfate  325 mg Oral Daily with breakfast    sertraline  50 mg Oral Daily    sodium chloride flush  5-40 mL IntraVENous 2 times per day    pantoprazole  40 mg Oral Daily    predniSONE  5 mg Oral Daily with breakfast       Data:  CBC:   Recent Labs     04/02/22  1005 04/03/22  0600 04/04/22  0440   WBC 28.6* 7.1 8.6   HGB 10.8* 9.1* 9.3*   HCT 33.9* 27.5* 28.3*   MCV 94.4 91.0 90.3    205 198     BMP:   Recent Labs     04/02/22  0515 04/03/22  0600 04/04/22  0440    141 146*   K 3.7 2.6* 2.9*   * 104 109   CO2 20* 27 29   BUN 29* 27* 27*   CREATININE 1.4* 1.3* 1.2     LIVER PROFILE:   No results for input(s): AST, ALT, LIPASE, BILIDIR, BILITOT, ALKPHOS in the last 72 hours. Invalid input(s): AMYLASE,  ALB    Microbiology:  3/30 UC E. Coli  3/30 BC E. Coli  3/30 COVID-19 not detected  3/31 C. difficile negative    Imaging:  CT abdomen/Pelvis 4/2  Mild body wall anasarca, small pleural effusions, and small amount of   abdominal and pelvic ascites, compatible with fluid overload   Scattered areas of colonic wall thickening are seen, either due to the   partially contracted state of the colon or wall thickening from underlying   Colitis. Posterior fundal fibroid versus thickening of the endometrial stripe. Recommend follow-up non urgent pelvic ultrasound for further characterization    CXR 4/4/22  ET tube terminates above the brayden.  Enteric tube courses through the chest   below level of diaphragm; tip is not visualized.  Left IJ catheter tip in the   SVC.  Bilateral interstitial opacities appear stable compared to prior study. No pleural effusion or pneumothorax.  Cardiac silhouette is stable.    Visualized osseous structures are unremarkable.          ASSESSMENT:  · Acute hypoxemic respiratory failure  · Septic shock  · E. coli bacteremia  · E. coli UTI  · Acute kidney injury  · Electrolyte disorder  · Tachyarrythmias/A. fib with RVR  · History of history of C. difficile troponin  · COPD chronically on home O2 2.5 L and AVAPS- not in exacerbation   · Chronic anxiety with panic attacks  · CAD post CABG 2019        PLAN:  Mechanical ventilation as per my orders. The ventilator was adjusted by me at the bedside for unstable, life threatening respiratory failure  Follow ABG and chest x-ray while on the ventilator  Supplemental oxygen to maintain SaO2 >92%; wean as tolerated  IV Propofol for sedation, target RASS -2, with daily spontaneous awakening trial   Fentanyl and Versed PRN, gtt as needed  Head of bed 30 degrees or higher at all times  Daily spontaneous breathing trial once PEEP less than 8, FiO2 less than 55%  Inhaled bronchodilators  Home dose 5 mg prednisone   Replace K  Change  cc/hr to LR  Zosyn D#3 and Vanc  mg PO QID D#5. Completed Ceftriaxone D#3   IV Levophed to keep MAP > 65 mmHg or SBP>90  Follow up Cx   Aspirin, Plavix, beta-blocker, cardiology following   Cardiology following   Tube feed today   Blood sugar control, with goal 140-180  DVT prophylaxis:  Heparin drip  Total critical care time caring for this patient with life threatening, unstable organ failure, including direct patient contact, management of life support systems, review of data including imaging and labs, discussions with other team members and physicians is 33 minutes, excluding procedures.

## 2022-04-04 NOTE — PROGRESS NOTES
Dr. Juan F Brannon called back and gives verbal order to extubate with the new results.  RT Tram called and says she will be over to assist.

## 2022-04-04 NOTE — PROGRESS NOTES
04/04/22 1155   Vent Information   Skin Assessment Clean, dry, & intact   Vent Type 840   Vent Mode PS   Rate Set 0 bmp   Pressure Support 5 cmH20   FiO2  35 %   SpO2 97 %   SpO2/FiO2 ratio 277.14   Sensitivity 3   PEEP/CPAP 5   Humidification Source Heated wire   Humidification Temp 37   Humidification Temp Measured 37   Circuit Condensation Drained   Vent Patient Data   Peak Inspiratory Pressure 12 cmH2O   Mean Airway Pressure 7.4 cmH20   Rate Measured 29 br/min   Vt Exhaled 493 mL   Minute Volume 12.5 Liters   I:E Ratio 1:2.2   Spontaneous Breathing Trial (SBT) RT Doc   Pulse 101   Additional Respiratory  Assessments   Resp 27

## 2022-04-04 NOTE — PROGRESS NOTES
Patient bathed, linens changed, able to remain awake and calm, tries to assist with turning and bathing. Amanda care complete, bond care complete. Repositioned. Bilateral Soft Wrist Restraints in place. Vent settings remain 35% with PEEP of 5, small secretions this am when suctioned, last night at beginning of shift secretions were large and thick. Will continue to monitor.

## 2022-04-04 NOTE — PROGRESS NOTES
Reassessment completed. Patient alert and oriented. Patient requests some water and another blanket. both requests satisfied and patient expresses no other needs.

## 2022-04-04 NOTE — PROGRESS NOTES
RT Inhaler-Nebulizer Bronchodilator Protocol Note    There is a bronchodilator order in the chart from a provider indicating to follow the RT Bronchodilator Protocol and there is an Initiate RT Inhaler-Nebulizer Bronchodilator Protocol order as well (see protocol at bottom of note). CXR Findings:  XR CHEST PORTABLE    Result Date: 4/4/2022  Line and tubes as above. Stable appearance of bilateral interstitial opacities. XR CHEST PORTABLE    Result Date: 4/3/2022  1. Suboptimal examination due to multiple extraneous wires projecting over the patient's chest. It appears devices are in similar position from prior. 2. Bilateral bronchial wall thickening with slightly increased reticulonodular opacities which may reflect evolving infectious/inflammatory airways process. The findings from the last RT Protocol Assessment were as follows:   History Pulmonary Disease: Chronic pulmonary disease  Respiratory Pattern: Mild dyspnea at rest, irregular pattern, or RR 21-25 bpm  Breath Sounds: Slightly diminished and/or crackles  Cough: Weak, non-productive  Indication for Bronchodilator Therapy: Decreased or absent breath sounds  Bronchodilator Assessment Score: 11    Aerosolized bronchodilator medication orders have been revised according to the RT Inhaler-Nebulizer Bronchodilator Protocol below. Respiratory Therapist to perform RT Therapy Protocol Assessment initially then follow the protocol. Repeat RT Therapy Protocol Assessment PRN for score 0-3 or on second treatment, BID, and PRN for scores above 3. No Indications - adjust the frequency to every 6 hours PRN wheezing or bronchospasm, if no treatments needed after 48 hours then discontinue using Per Protocol order mode. If indication present, adjust the RT bronchodilator orders based on the Bronchodilator Assessment Score as indicated below.   Use Inhaler orders unless patient has one or more of the following: on home nebulizer, not able to hold breath for 10 seconds, is not alert and oriented, cannot activate and use MDI correctly, or respiratory rate 25 breaths per minute or more, then use the equivalent nebulizer order(s) with same Frequency and PRN reasons based on the score. If a patient is on this medication at home then do not decrease Frequency below that used at home. 0-3 - enter or revise RT bronchodilator order(s) to equivalent RT Bronchodilator order with Frequency of every 4 hours PRN for wheezing or increased work of breathing using Per Protocol order mode. 4-6 - enter or revise RT Bronchodilator order(s) to two equivalent RT bronchodilator orders with one order with BID Frequency and one order with Frequency of every 4 hours PRN wheezing or increased work of breathing using Per Protocol order mode. 7-10 - enter or revise RT Bronchodilator order(s) to two equivalent RT bronchodilator orders with one order with TID Frequency and one order with Frequency of every 4 hours PRN wheezing or increased work of breathing using Per Protocol order mode. 11-13 - enter or revise RT Bronchodilator order(s) to one equivalent RT bronchodilator order with QID Frequency and an Albuterol order with Frequency of every 4 hours PRN wheezing or increased work of breathing using Per Protocol order mode. Greater than 13 - enter or revise RT Bronchodilator order(s) to one equivalent RT bronchodilator order with every 4 hours Frequency and an Albuterol order with Frequency of every 2 hours PRN wheezing or increased work of breathing using Per Protocol order mode. RT to enter RT Home Evaluation for COPD & MDI Assessment order using Per Protocol order mode.     Electronically signed by Annette Edwards RCP on 4/4/2022 at 12:17 PM

## 2022-04-04 NOTE — PROGRESS NOTES
Comprehensive Nutrition Assessment    Type and Reason for Visit:  Initial,Consult,Positive Nutrition Screen (consult for TF ordering and management; + screen for difficulty chewing and/or swallowing)    Nutrition Recommendations/Plan:   1. Continue ADULT DIET; Dysphagia - Pureed diet order - consistency changes, per SLP. 2. Monitor appetite and po intake + whether ONS is needed. 3. Monitor respiratory status and plan of care. 4. Monitor nutrition-related labs, bowel function, and weight trends. Nutrition Assessment:  patient is nutritionally compromised AEB respiratory dysfunction and \"not feeling good\" PTA + decreased appetite/po intake and reported difficulty chewing and/or swallowing; patient is at risk for further compromise d/t altered nutrition-related labs, clinical dehydration upon admission, and diarrhea; will continue ADULT DIET; Dysphagia - Pureed diet order and monitor nutrition status    Malnutrition Assessment:  Malnutrition Status: At risk for malnutrition  Context:  Acute Illness     Findings of the 6 clinical characteristics of malnutrition:  Energy Intake:  Mild decrease in energy intake (decreased appetite/po intake for unspecified amount of time PTA)  Weight Loss:  Unable to assess     Body Fat Loss:  Unable to assess     Muscle Mass Loss:  Unable to assess    Fluid Accumulation:  No significant fluid accumulation     Strength:  Not Performed    Estimated Daily Nutrient Needs:  Energy (kcal):  1334 - 1450 kcals based on 23-25 kcals/kg/CBW; Weight Used for Energy Requirements:  Current     Protein (g):  70 - 87 g protein based on 1.2-1.5 g/kg/CBW;  Weight Used for Protein Requirements:  Current        Fluid (ml/day):  1334 - 1450 ml; Method Used for Fluid Requirements:  1 ml/kcal      Nutrition Related Findings:  patient is awake and alert + minimally confused; + clinical dehydration noted upon admission; patient is [place] and has poor dentition; + BM on 4/3/22 (+ diarrhea); patient Discharge Planning:     Too soon to determine     Electronically signed by Hugo Cho RD, LD on 4/4/22 at 4:00 PM EDT    Contact: [phone]

## 2022-04-04 NOTE — PROGRESS NOTES
 Acute respiratory failure with hypoxia and hypercapnia (HCC) 09/16/2021    Shock ([address]     Pneumonia due to infectious organism     Acute respiratory distress 09/09/2021    Volume overload 09/09/2021    Demand ischemia ([address] 09/09/2021    GERD (gastroesophageal reflux disease) 09/09/2021    Severe malnutrition ([address] 08/31/2021    Acute respiratory failure ([address] 08/30/2021    Chronic respiratory failure with hypoxia (HCC)     Primary hypertension     Anemia     Chest pain 09/18/2020    GI bleed 02/23/2020    Moderate malnutrition ([address] 09/25/2019    S/P CABG x 2 09/25/2019    Pneumonia of both lower lobes due to methicillin resistant Staphylococcus aureus (MRSA) ([address] 09/25/2019    CAD in native artery 09/24/2019    Mucus plugging of bronchi     Status post aorto-coronary artery bypass graft     NADJA (acute kidney injury) ([address]     Hypernatremia 09/14/2019    Ischemic cardiomyopathy     Acute combined systolic and diastolic congestive heart failure (HCC)     Acute respiratory failure with hypoxia ([address] 09/08/2019    CAD (coronary artery disease) 09/08/2019    Acute pulmonary edema ([address] 09/08/2019    Pulmonary infiltrate 09/08/2019    Elevated brain natriuretic peptide (BNP) level 09/08/2019    Leukocytosis 09/08/2019    Elevated troponin 09/04/2019    NSTEMI (non-ST elevated myocardial infarction) ([address] 09/03/2019    Abnormal chest x-ray     COPD with acute exacerbation (HCC)     Shortness of breath     Tobacco abuse     Septicemia ([address] 03/18/2019    COPD exacerbation ([address] 12/29/2017    OA (osteoarthritis) 08/12/2014    Tobacco use 08/12/2014    Hyperlipidemia 05/07/2013    Asthma 05/07/2013     Past Medical History:   Diagnosis Date    NADJA (acute kidney injury) ([address]     Asthma     CAD (coronary artery disease)     CHF (congestive heart failure) ([address]     unsure    Chronic anxiety     COPD (chronic obstructive pulmonary disease) (HCC)     GERD (gastroesophageal reflux disease) 9/9/2021    History of blood transfusion     Hyperlipidemia     Hypertension     MRSA (methicillin resistant staph aureus) culture positive 09/22/2019    + resp cx    OA (osteoarthritis) 8/12/2014    Thyroid disease      Past Surgical History:   Procedure Laterality Date    BRONCHOSCOPY  09/08/2019    Dr. Willis Side - w/BAL   [address]  09/05/2019    Dr. Carlin Salomon N/A 2/24/2020    COLONOSCOPY DIAGNOSTIC performed by Marvin Bishop DO at [address] N/A 10/22/2021    COLONOSCOPY POLYPECTOMY SNARE/COLD BIOPSY performed by Shabbir Ivy MD at [address] [facility] GRAFT N/A 9/19/2019    OFF PUMP CORONARY ARTERY BYPASS GRAFTING X2, INTERNAL MAMMARY ARTERY, SAPHENOUS VEIN GRAFT performed by Emil Luciano MD at [facility] CATH  9/20/2021    [facility] CATH 9/20/2021 [address] PROCEDURES    MASTECTOMY, PARTIAL Left     SIGMOIDOSCOPY  02/27/2020    4 bands    SIGMOIDOSCOPY N/A 2/27/2020    FLEX SIG W/ BANDING SIGMOIDOSCOPY DIAGNOSTIC FLEXIBLE performed by Marvin Bishop DO at [address] [facility]   Allergen Reactions    Latex Other (See Comments)     Burning      Codeine Other (See Comments)     \"hallucinations\"       DATE ONSET: 3/30/2022    Date of Evaluation: 4/4/2022   Evaluating Therapist: Jamie Renee, SLP    Chart Reviewed: : [x] Yes [] No    Current Diet: Diet NPO    Recent Chest Radiography: [x] Chest XR   [] CT of Chest  Date: 04/03/2022  Impressions  Impression   Line and tubes as above.       Stable appearance of bilateral interstitial opacities.           Pain: The patient does not complain of pain     Reason for Referral  Sonia Hi was referred for a bedside swallow evaluation to assess the efficiency of their swallow function, identify signs and symptoms of aspiration and make recommendations regarding safe dietary consistencies, effective compensatory strategies, and safe eating environment. Assessment    Medical record review/interview: Per MD H&P: \"The patient is a [de-identified] y.o. female with PMH below, presents with SOB, ZENDEJAS, increased O demand, productive cough. Pt has hx of COPD and is usually on 2L O2. She reports over the last couple of days she has been feeling increasingly SOB. Her O2 requirement has gone up to 4L. She was initially hypotensive and then later became hypertensive, markedy tachycardic/tachypnic. This was after IVF. She was give IV metoprolol. Her VS stabilized. She related this episode to anxiety. She denies CP. \"        Patient Complaints:  Odynophagia: [] Yes [x] No  Globus Sensation: [] Yes [x] No  SOB with PO intake: [] Yes [x] No  Increased WOB with PO intake: [] Yes [x] No  Reflux Sx's: [] Yes [x] No  Weight loss: [] Yes [x] No  Coughing/Choking with PO intake: [x] Yes [] No  Reduced Appetite: [] Yes [x] No    Additional Reported Symptoms/Complaints: Intubated 04/02 and extubated this date. Per RN, with with wet vocal quality and throat clearing during nursing bedside. Pt with hx of dysphagia. MBS completed at [facility] on 09/13/2022 with deep penetration of thin liquids to the folds and mild penetration of nectar thick liquids. Overt s/s of aspiration of thin liquids at bedside, therefore, SLP recommended mildly thick liquids with thin liquid trials at bedside with SLP. Soft and Bite-Sized solids. Pt has also been seen by SLP in past at [address] [place] [address] for cognition. Pt reports preference for pureed solids. Was not on thickened liquids prior to admission. Has upper dentures but they are at home. Lives at home with family.       Predisposing dysphagia risk factors: COPD and GERD  Clinical signs of possible chronic dysphagia: hx of dysphagia  Precipitating dysphagia risk factors: reduced physical mobility and increased O2 demands    Vitals/labs:     Vitals:    04/04/22 1155 04/04/22 1200 04/04/22 1202 04/04/22 1230   BP:  130/72     Pulse: 101 109 101    Resp: 27 (!) 31 (!) 34    Temp:       TempSrc:       SpO2: 97% 100% 100% 100%   Weight:       Height:            CBC:   Recent Labs     04/04/22  0440   WBC 8.6   HGB 9.3*         BMP:  Recent Labs     04/04/22  0440   *   K 2.9*      CO2 29   BUN 27*   CREATININE 1.2   GLUCOSE 142*          Cranial nerve exam:   CN V (trigeminal): ophthalmic, maxillary, and mandibular facial sensation- WNL b/l  CN VII (facial): WNL b/l  CN IX/X (glossopharyngeal/vagus): MPT: WFL; pitch range: WFL; vocal quality: hoarse; cough: Strong-perceptually, Congested and Non-Productive  CN XII (hypoglossal): WNL b/l      Laryngeal function exam:   Secretions: Oral mucosa pink and dry  Vocal quality: See CN exam above  MPT: See CN exam above  S/Z ratio: WFL  Pitch range: See CN exam above  Cough: See CN exam above    Oral Care Status:    [] Oral Care [facility]  [x] Poor oral care status: Oral care completed via oral suction by SLP   [] Edentulous  [] Upper Dentures: Has upper dentures at home.  Lower dentition intact despite some missing teeth  [] Lower Dentures  [x] Missing/Broken Teeth  [] Evidence of dental cavities/carries    PO trials:   Ice:WFL with no clinical s/s of aspiration    IDDSI 0 (thin):   - 5cc bolus (tsp): Pt declined  - Cup: Pt declined  - Straw: no anterior bolus loss , suspect functional A-P bolus transit, suspect premature bolus loss into pharynx, suspect delayed swallow onset, no clinical s/s of aspiration and vitals stable    IDDSI 4 (puree): no anterior bolus loss , suspect functional A-P bolus transit, swallow timing subjectively appears timely, oral clearance grossly WFL and no clinical s/s of aspiration    IDDSI 7 (regular): suspect functional A-P bolus transit, swallow timing subjectively appears timely, grossly functional mastication, oral clearance grossly WFL and no clinical s/s of aspiration - pt reports she prefers pureed food    3 oz water: PASS    Impressions:    Clinical signs of oropharyngeal dysphagia likely acute-on-chronic related to hx of dysphagia, reduced physical mobility and increased O2 demands. Swallow prognosis is good. Instrumental swallow study is not indicated. Given tolerance to PO at bedside and lack of clinical s/s of aspiration at bedside, pt is safe for oral diet at this time    Instrumentation: Not clinically indicated at this time. Will continue to monitor for need. Diet recommendation: IDDSI 4 Puree Solids- per her preference; IDDSI 0 Thin Liquids; Meds whole with thin liquids  Risk management: upright for all intake, stay upright for at least 30 mins after intake, small bites/sips, distant supervision with intake, oral care 2-3x/day to reduce adverse affects in the event of aspiration, slow rate of intake, general aspiration precautions and hold PO and contact SLP if s/s of aspiration or worsening respiratory status develop.     Prognosis: Good    Recommended Intervention:  [x] Dysphagia tx  [] Videostroboscopy                      [] NPO   [] MBS       [] Speech/Cog Eval    [] Therapeutic PO Trials     [] Ice Chips   [] Other:  [] FEES                                                 Dysphagia Therapeutic Intervention:  []  Bolus control Exercises  []  Oral Motor Exercises  []  [employer] Protocol  []  Thermal Stimulation  []  Oral Care    []  Vital Stim/NMES  []  Laryngeal Exercises  [x]  Patient/Family Education  []  Pharyngeal Exercises  [x]  Therapeutic PO trials with SLP  [x]  Diet tolerance monitoring  []  Other:     Referrals:  [] ENT    [] PT  [x] Pulmonology [] GI  [] Neurology  [] RD  [] OT   []     Goals:  Short Term Goals:  Timeframe for Short Term Goals: (5 days 04/09/2022)  Goal 1: The patient will tolerate recommended diet with no clinical s/s of aspiration 5/5  Goal 2: The patient will tolerate therapeutic diet upgrade trials with no clinical s/s of aspiration 5/5  Goal 3: The patient will recall/perform recommended compensatory strategies given min cues      Long Term Goals:   Timeframe for Long Term Goals: (7 days 04/11/2022)  Goal 1: The patient will tolerate least restrictive diet with no clinical s/s of aspiration or worsening respiratory/pulmonary status    Treatment:  Skilled instruction completed with patient re: evidenced based practice regarding recommendations and POC, importance of oral care to reduce adverse affects in the event of aspiration, and instruction of recommended compensatory strategies developed based upon clinical exam. Pt able to recall/demonstrate compensatory strategies with min cues. Pt Education: SLP educated the patient re: Role of SLP, rationale for completion of assessment, anatomical components of swallow structures as they pertain to airway protection, results of assessment, recommendations and POC  Pt Education Response: verbalized understanding and RN aware    Duration/Frequency of Tx: 2-4x/wk    Individuals Consulted:   [x]  Patient     []  NP         [x]  RN   []  RD                   [x]  MD      []  Family Member                        []  PA    []  Other:      Safety Devices / Report:  [x]  All fall risk precautions in place [x]  Safety handoff completed with RN  [x]  Bed alarm in place  [x]  Left in bed     []  Chair alarm in place  []  Left in chair   []  Call light in reach   []  Other:      Total Treatment Time / Charges       Time in Time out Total Time / units   Swallow Eval/Tx Time  1405 9571 26 mins / 2 units     Signature:  Neha Medina M.A., [place] #[id number]  Speech-Language Pathologist  Phone: [phone]

## 2022-04-04 NOTE — PROGRESS NOTES
Shift assessment completed, see flow sheet. RASS 0. Following commands.   - Propofol infusing at 25 mcg/kg/min. Intubated and sedated on AC # 7.5 ETT, at 22 LL. 23 / 330 / 35 %/ +5. SpO2 100%. Respirations are easy, even, and unlabored. Bilateral lung sounds diminished. VSS  OG in place at 65. .       CVC/PIV, WNL. Patient's left arm is weeping with fluid. She also has an area on the inner forearm about 5 inches long and 2 inches wide that is swollen, red and warm to the touch. All lines and monitoring devices in place. Bill is patent and secured. Flexiseal patent and in place draining properly. Bilateral soft wrist restraints in place for patient safety. Bed in lowest position with wheels locked.

## 2022-04-04 NOTE — PLAN OF CARE
Nutrition Problem #1: Inadequate oral intake  Intervention: Food and/or Nutrient Delivery: Continue Current Diet  Nutritional Goals: patient will consume 50% or greater of meals on ADULT DIET;  Dysphagia - Pureed diet order x 3 meals per day

## 2022-04-04 NOTE — PROGRESS NOTES
Patient reassessment complete, lung sounds clear and diminished, on Ventilator 35% FiO2/PEEP 5, SaO2 100%, no s/s distress, doing well with propofol, awakens easily, calm.   Turned and repositioned, call light in reach, TV on per patient request.

## 2022-04-04 NOTE — PROGRESS NOTES
04/04/22 0314   Vent Information   Equipment Changed Humidification   Vent Type 840   Vent Mode AC/VC   Vt Ordered 330 mL   Rate Set 23 bmp   Peak Flow 60 L/min   FiO2  35 %   Sensitivity 3   PEEP/CPAP 5   Vent Patient Data   Peak Inspiratory Pressure 7.3 cmH2O   Mean Airway Pressure 6.9 cmH20   Rate Measured 25 br/min   Vt Exhaled 387 mL   Minute Volume 9.63 Liters   I:E Ratio 1:3.1   Cough/Sputum   Sputum How Obtained Suctioned;Endotracheal   Sputum Amount Small   Sputum Color Cloudy   Tenacity Thick   Spontaneous Breathing Trial (SBT) RT Doc   Pulse 91   Breath Sounds   Right Upper Lobe Diminished   Right Middle Lobe Diminished   Right Lower Lobe Diminished   Left Upper Lobe Diminished   Left Lower Lobe Diminished   Additional Respiratory  Assessments   Resp 25   Position Semi-Hernadez's   ETT (adult)   Placement Date/Time: 04/02/22 1130   Preoxygenation: Yes  Tube Size: 7.5 mm  Laryngoscope: GlideScope  Location: Oral  Secured at: 22 cm  Placed By: Other (Comment)  Measured From: [address]

## 2022-04-04 NOTE — PROGRESS NOTES
Sedation turned off at this time for SBT. Patient awake and alert.  Patient flipped from [facility] and PS by [place], RT.

## 2022-04-04 NOTE — PROGRESS NOTES
Hospitalist Progress Note      PCP: Kianna Bridges MD    Date of Admission: 3/30/2022    Chief Complaint: SOB from home via EMS. Hx of end stage COPD. Home o2 dependent - reports 2.5L baseline - increases to 3-4L when SOB     Recently treated for Cdiff diarrhea. Reports she finished Abx about a week ago - per home care notes Vanc was extended from 3/24-3/29 after the initial treatment and she finished it on 3/29. Pt reports foul smelling urine but denies dysuria. Subjective:     4/2  resp status worsened through the morning. Intubated urgently at bedside due to severe mixed resp and metabolic acidemia. 4/3  Intubated and sedated. Off levophed today. O2 sats 100% on FiO2 35% PEEP 5. Clinically appears much better and lung exam improved. 4/4-patient is extubated around noon today. Completely awake and alert. On 3 L of oxygen. Doing well. She is hungry. She wants to eat. Minimally confused.         Medications:  Reviewed    Infusion Medications    lactated ringers 100 mL/hr at 04/04/22 1029    heparin (PORCINE) Infusion 940 Units/hr (04/04/22 0526)    norepinephrine Stopped (04/03/22 0520)    sodium chloride      dextrose      sodium chloride       Scheduled Medications    QUEtiapine  25 mg Oral BID    metoprolol tartrate  25 mg Oral BID    piperacillin-tazobactam  3,375 mg IntraVENous Q8H    methylPREDNISolone  40 mg IntraVENous Q8H    levalbuterol  1.25 mg Nebulization 4x daily    insulin lispro  0-6 Units SubCUTAneous Q4H    fluticasone  1 spray Each Nostril Daily    vancomycin  125 mg Oral 4 times per day    atorvastatin  40 mg Oral Nightly    clopidogrel  75 mg Oral Daily    ferrous sulfate  325 mg Oral Daily with breakfast    sertraline  50 mg Oral Daily    sodium chloride flush  5-40 mL IntraVENous 2 times per day    pantoprazole  40 mg Oral Daily    predniSONE  5 mg Oral Daily with breakfast     PRN Meds: potassium chloride, magnesium sulfate, sodium chloride, heparin (porcine), heparin (porcine), levalbuterol, sodium chloride, glucose, glucagon (rDNA), dextrose, dextrose bolus (hypoglycemia) **OR** dextrose bolus (hypoglycemia), sodium chloride flush, sodium chloride, ondansetron **OR** ondansetron, polyethylene glycol, acetaminophen **OR** acetaminophen      Intake/Output Summary (Last 24 hours) at 4/4/2022 1435  Last data filed at 4/4/2022 0853  Gross per 24 hour   Intake 1731.52 ml   Output 2000 ml   Net -268.48 ml       Physical Exam Performed:    /72   Pulse 101   Temp 98.9 °F (37.2 °C) (Bladder)   Resp (!) 34   Ht 5' 4\" (1.626 m)   Wt 127 lb 14.4 oz (58 kg)   SpO2 100%   BMI 21.95 kg/m²     General appearance: Awake and alert. Extubated. HEENT: Pupils equal, round, and reactive to light. Conjunctivae/corneas clear. Neck: Supple, with full range of motion. No jugular venous distention. Trachea midline. Respiratory:  Rhonchi much improved today. Cardiovascular: Regular rate and rhythm with normal S1/S2 without murmurs, rubs or gallops. Abdomen: Soft, non-tender, non-distended with normal bowel sounds. Musculoskeletal: No clubbing, cyanosis or edema bilaterally. Full range of motion without deformity. Skin: Skin color, texture, turgor normal.  No rashes or lesions. Neurologic:  Intubated and sedated. Capillary Refill: Brisk,3 seconds, normal   Peripheral Pulses: +2 palpable, equal bilaterally       Labs:   Recent Labs     04/02/22  1005 04/03/22  0600 04/04/22  0440   WBC 28.6* 7.1 8.6   HGB 10.8* 9.1* 9.3*   HCT 33.9* 27.5* 28.3*    205 198     Recent Labs     04/02/22  0515 04/03/22  0600 04/04/22  0440    141 146*   K 3.7 2.6* 2.9*   * 104 109   CO2 20* 27 29   BUN 29* 27* 27*   CREATININE 1.4* 1.3* 1.2   CALCIUM 8.8 8.1* 8.5     No results for input(s): AST, ALT, BILIDIR, BILITOT, ALKPHOS in the last 72 hours. No results for input(s): INR in the last 72 hours.   Recent Labs     04/02/22  1005 TROPONINI 0.11*       Urinalysis:      Lab Results   Component Value Date    NITRU POSITIVE 03/30/2022    WBCUA  03/30/2022    BACTERIA 3+ 03/30/2022    RBCUA 3-4 03/30/2022    BLOODU MODERATE 03/30/2022    SPECGRAV 1.015 03/30/2022    GLUCOSEU Negative 03/30/2022       Radiology:  XR CHEST PORTABLE   Final Result   Line and tubes as above. Stable appearance of bilateral interstitial opacities. XR CHEST PORTABLE   Final Result   1. Suboptimal examination due to multiple extraneous wires projecting over   the patient's chest. It appears devices are in similar position from prior. 2. Bilateral bronchial wall thickening with slightly increased   reticulonodular opacities which may reflect evolving infectious/inflammatory   airways process. XR CHEST PORTABLE   Final Result   Satisfactory line and tube placement. CT ABDOMEN PELVIS WO CONTRAST Additional Contrast? None   Final Result   Mild body wall anasarca, small pleural effusions, and small amount of   abdominal and pelvic ascites, compatible with fluid overload      Scattered areas of colonic wall thickening are seen, either due to the   partially contracted state of the colon or wall thickening from underlying   colitis      Posterior fundal fibroid versus thickening of the endometrial stripe. Recommend follow-up non urgent pelvic ultrasound for further characterization. XR CHEST PORTABLE   Final Result   Interval placement of a left IJ approach central venous catheter with the tip   overlying the mid SVC, with no pneumothorax identified.          XR CHEST PORTABLE   Final Result   Bilateral ill-defined linear pulmonary opacities could represent pulmonary   edema or infection                 Assessment/Plan:    Principal Problem:    Sepsis ([address]  Active Problems:    Elevated troponin    S/P CABG x 2    Tachycardia    Acute hypoxemic respiratory failure (HCC)    Septic shock (HCC)    Bacteremia    Urinary tract infection without hematuria    Atrial fibrillation with RVR (HCC)    Electrolyte disorder  Resolved Problems:    * No resolved hospital problems. *       Sepsis with severe sepsis and septic shock. Diarrhea - recent Cdiff infection, though Cdiff negative this admission. Bacteremia - Ecoli (MDRO) on blood cultures  And also on urine culture from 3/30  IV rocephin - to IV zosyn on 4/2  Empiric PO vanc - will need to be on this while on other Abx and for at least 10 days after due to recent Cdiff. She is off vasopressors. 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 Acute on chronic hypoxic resp failure. Decompensated 4/2 and intubated urgently. Extubated on 4/4/2022. Solumedrol IV. Xopenex due to severe sinus tachycardia. Possibly Afib   Sinus tachy this am.   Off dilt gtt. On heparin gtt. Acute Acidemia 4/2 - much better now   pH 7.08, mixed respiratory and metabolic acidosis resulting in severe acidemia. 1 amp of bicarb   Intubated 4/2  IVF adjusted. Abx adjusted. Mood disorder on zoloft. CAD  On plavix. BB off due to septic shock. Statin on hold. Hx of GI bleed. Cont PPI. Monitor Hgb. Mod to severe protein calorie malnutrition. Supplement as tolerates. -Speech therapy evaluation. Start diet after that. DVT Prophylaxis: heparin  Diet: ADULT DIET; Dysphagia - Pureed  Code Status: Full Code        Prior code status discussions - pt and family insist on full code.                Chelsie Mcgovern MD 4/4/2022 2:37 PM

## 2022-04-04 NOTE — FLOWSHEET NOTE
04/03/22 2001   Vital Signs   Temp 98.7 °F (37.1 °C)   Temp Source Bladder   Pulse 123   Heart Rate Source Monitor   Resp 27   /64   BP Location Left upper arm   MAP (mmHg) 80   Patient Position Supine   Level of Consciousness Alert (0)   MEWS Score 4   Oxygen Therapy   SpO2 99 %   Pulse Oximeter Device Mode Continuous   Pulse Oximeter Device Location Right;Finger   O2 Device Ventilator   FiO2  35 %   PEEP/CPAP (cm H2O) 5 cm H20   Patient Observation   Observations ETT 7.5 @ 22cm lip line   Patient alert awake, trying to talk to nurse, intubated, difficult to understand. Patient suctioned per ETT, moderate thick white secretions obtained, oral secretions large thick mucous cleared. On propofol will increase rate due to anxiety and alertness, pulse ox. Will continue to monitor.

## 2022-04-04 NOTE — PROGRESS NOTES
Anti-Xa (17:05) = 0.32. Therapeutic. Continue Heparin infusion at 940 units/hr. This is the second consecutive therapeutic Anti-Xa, but since it dropped from the previous value of 0.42, will schedule another Anti-Xa for 23:00.   Rula Lal R.Ph.4/4/20226:05 PM

## 2022-04-04 NOTE — PROGRESS NOTES
Rounding completed with Dr. Talia Harris and multidisciplinary team. POC, labs, lines and assessment reviewed.    - Gas at 1000 --> extubate?  - Change IV fluid to LR @ 100

## 2022-04-04 NOTE — PROGRESS NOTES
Pharmacy - RE:  Low-dose Heparin drip  Current rate = 9.4 ml/h  (940 units/h)  Anti-Xa drawn @ 1127 = 0.42 IU/ml  Goal Anti-Xa = 0.3 - 0.7 IU/ml  Per protocol, continue rate of 940 units/hr, and obtain another Anti-Xa 4/4 @ 1730. negative

## 2022-04-04 NOTE — PROGRESS NOTES
Reassessment completed. Patient now extubated - O2 100% on 3L NC. Patient is alert and oriented. Patient has no needs at this time. Granddaughter is at bedside.

## 2022-04-04 NOTE — CARE COORDINATION
INTERDISCIPLINARY PLAN OF CARE CONFERENCE    Date/Time: 4/4/2022 9:29 AM  Completed by: SANTO Parker  Case Management      Patient Name:  Simone Tsai  YOB: 1940  Admitting Diagnosis: Septicemia ([facility] [address] [A41.9]  Elevated troponin [R77.8]  NADJA (acute kidney injury) ([facility] [N17.9]  Sepsis ([facility] [A41.9]     Admit Date/Time:  3/30/2022  1:14 PM    Chart reviewed. Interdisciplinary team contacted or reviewed plan related to patient progress and discharge plans. Disciplines included Case Management, Nursing, and Dietitian. Current Status: Ongoing. Vent  PT/OT recommendation for discharge plan of care: TBD    Expected D/C Disposition:  Home    Discharge Plan Comments: Chart review completed. Completed Interdisciplinary rounds with ICU staff. Pt is from home with family and plans on returning with resumption of [address] [facility]). Pt will need re-certified for home o2 with [facility] per conversation with Mayte Clay liaison. CM will continue to follow and assist. Please notify CM if needs or concerns arise.     Home O2 in place on admit: No

## 2022-04-05 ENCOUNTER — APPOINTMENT (OUTPATIENT)
Dept: GENERAL RADIOLOGY | Age: 82
DRG: 871 | End: 2022-04-05
Payer: MEDICARE

## 2022-04-05 LAB
ANION GAP SERPL CALCULATED.3IONS-SCNC: 10 MMOL/L (ref 3–16)
ANTI-XA UNFRAC HEPARIN: 0.29 IU/ML (ref 0.3–0.7)
ANTI-XA UNFRAC HEPARIN: 0.51 IU/ML (ref 0.3–0.7)
ANTI-XA UNFRAC HEPARIN: 0.62 IU/ML (ref 0.3–0.7)
BASE EXCESS ARTERIAL: 1.6 MMOL/L (ref -3–3)
BASOPHILS ABSOLUTE: 0 K/UL (ref 0–0.2)
BASOPHILS RELATIVE PERCENT: 0 %
BUN BLDV-MCNC: 24 MG/DL (ref 7–20)
CALCIUM SERPL-MCNC: 8.7 MG/DL (ref 8.3–10.6)
CARBOXYHEMOGLOBIN ARTERIAL: 0.3 % (ref 0–1.5)
CHLORIDE BLD-SCNC: 110 MMOL/L (ref 99–110)
CO2: 27 MMOL/L (ref 21–32)
CREAT SERPL-MCNC: 1.1 MG/DL (ref 0.6–1.2)
EKG ATRIAL RATE: 159 BPM
EKG DIAGNOSIS: NORMAL
EKG P AXIS: 68 DEGREES
EKG P-R INTERVAL: 146 MS
EKG Q-T INTERVAL: 236 MS
EKG QRS DURATION: 88 MS
EKG QTC CALCULATION (BAZETT): 381 MS
EKG R AXIS: -52 DEGREES
EKG T AXIS: 80 DEGREES
EKG VENTRICULAR RATE: 157 BPM
EOSINOPHILS ABSOLUTE: 0 K/UL (ref 0–0.6)
EOSINOPHILS RELATIVE PERCENT: 0.1 %
GFR AFRICAN AMERICAN: 58
GFR NON-AFRICAN AMERICAN: 48
GLUCOSE BLD-MCNC: 105 MG/DL (ref 70–99)
GLUCOSE BLD-MCNC: 131 MG/DL (ref 70–99)
GLUCOSE BLD-MCNC: 141 MG/DL (ref 70–99)
GLUCOSE BLD-MCNC: 86 MG/DL (ref 70–99)
GLUCOSE BLD-MCNC: 91 MG/DL (ref 70–99)
HCO3 ARTERIAL: 25.8 MMOL/L (ref 21–29)
HCT VFR BLD CALC: 29.6 % (ref 36–48)
HEMOGLOBIN, ART, EXTENDED: 12.8 G/DL (ref 12–16)
HEMOGLOBIN: 9.6 G/DL (ref 12–16)
LYMPHOCYTES ABSOLUTE: 0.3 K/UL (ref 1–5.1)
LYMPHOCYTES RELATIVE PERCENT: 3.1 %
MAGNESIUM: 2 MG/DL (ref 1.8–2.4)
MCH RBC QN AUTO: 29.7 PG (ref 26–34)
MCHC RBC AUTO-ENTMCNC: 32.3 G/DL (ref 31–36)
MCV RBC AUTO: 91.8 FL (ref 80–100)
METHEMOGLOBIN ARTERIAL: 0.3 %
MONOCYTES ABSOLUTE: 0.3 K/UL (ref 0–1.3)
MONOCYTES RELATIVE PERCENT: 3.5 %
NEUTROPHILS ABSOLUTE: 9.3 K/UL (ref 1.7–7.7)
NEUTROPHILS RELATIVE PERCENT: 93.3 %
O2 SAT, ARTERIAL: 96.6 %
O2 THERAPY: NORMAL
PCO2 ARTERIAL: 38.9 MMHG (ref 35–45)
PDW BLD-RTO: 13.8 % (ref 12.4–15.4)
PERFORMED ON: ABNORMAL
PERFORMED ON: ABNORMAL
PERFORMED ON: NORMAL
PERFORMED ON: NORMAL
PH ARTERIAL: 7.44 (ref 7.35–7.45)
PLATELET # BLD: 222 K/UL (ref 135–450)
PMV BLD AUTO: 8.1 FL (ref 5–10.5)
PO2 ARTERIAL: 83.9 MMHG (ref 75–108)
POTASSIUM SERPL-SCNC: 3.3 MMOL/L (ref 3.5–5.1)
POTASSIUM SERPL-SCNC: 4.4 MMOL/L (ref 3.5–5.1)
PRO-BNP: ABNORMAL PG/ML (ref 0–449)
RBC # BLD: 3.22 M/UL (ref 4–5.2)
SODIUM BLD-SCNC: 147 MMOL/L (ref 136–145)
TCO2 ARTERIAL: 27 MMOL/L
TROPONIN: 0.04 NG/ML
TROPONIN: 0.04 NG/ML
TROPONIN: 0.05 NG/ML
WBC # BLD: 9.9 K/UL (ref 4–11)

## 2022-04-05 PROCEDURE — 94660 CPAP INITIATION&MGMT: CPT

## 2022-04-05 PROCEDURE — 6370000000 HC RX 637 (ALT 250 FOR IP): Performed by: INTERNAL MEDICINE

## 2022-04-05 PROCEDURE — 71045 X-RAY EXAM CHEST 1 VIEW: CPT

## 2022-04-05 PROCEDURE — C9113 INJ PANTOPRAZOLE SODIUM, VIA: HCPCS | Performed by: INTERNAL MEDICINE

## 2022-04-05 PROCEDURE — 6360000002 HC RX W HCPCS: Performed by: INTERNAL MEDICINE

## 2022-04-05 PROCEDURE — 82803 BLOOD GASES ANY COMBINATION: CPT

## 2022-04-05 PROCEDURE — 2000000000 HC ICU R&B

## 2022-04-05 PROCEDURE — 6360000002 HC RX W HCPCS: Performed by: HOSPITALIST

## 2022-04-05 PROCEDURE — 94640 AIRWAY INHALATION TREATMENT: CPT

## 2022-04-05 PROCEDURE — 2580000003 HC RX 258: Performed by: NURSE PRACTITIONER

## 2022-04-05 PROCEDURE — 85520 HEPARIN ASSAY: CPT

## 2022-04-05 PROCEDURE — 99233 SBSQ HOSP IP/OBS HIGH 50: CPT | Performed by: INTERNAL MEDICINE

## 2022-04-05 PROCEDURE — 83735 ASSAY OF MAGNESIUM: CPT

## 2022-04-05 PROCEDURE — 36415 COLL VENOUS BLD VENIPUNCTURE: CPT

## 2022-04-05 PROCEDURE — 83880 ASSAY OF NATRIURETIC PEPTIDE: CPT

## 2022-04-05 PROCEDURE — 84484 ASSAY OF TROPONIN QUANT: CPT

## 2022-04-05 PROCEDURE — 80048 BASIC METABOLIC PNL TOTAL CA: CPT

## 2022-04-05 PROCEDURE — 99291 CRITICAL CARE FIRST HOUR: CPT | Performed by: INTERNAL MEDICINE

## 2022-04-05 PROCEDURE — 6370000000 HC RX 637 (ALT 250 FOR IP): Performed by: NURSE PRACTITIONER

## 2022-04-05 PROCEDURE — 84132 ASSAY OF SERUM POTASSIUM: CPT

## 2022-04-05 PROCEDURE — 2580000003 HC RX 258: Performed by: INTERNAL MEDICINE

## 2022-04-05 PROCEDURE — 94761 N-INVAS EAR/PLS OXIMETRY MLT: CPT

## 2022-04-05 PROCEDURE — 85025 COMPLETE CBC W/AUTO DIFF WBC: CPT

## 2022-04-05 PROCEDURE — 2700000000 HC OXYGEN THERAPY PER DAY

## 2022-04-05 RX ORDER — SODIUM CHLORIDE 450 MG/100ML
INJECTION, SOLUTION INTRAVENOUS CONTINUOUS
Status: DISCONTINUED | OUTPATIENT
Start: 2022-04-05 | End: 2022-04-08

## 2022-04-05 RX ORDER — FUROSEMIDE 10 MG/ML
20 INJECTION INTRAMUSCULAR; INTRAVENOUS ONCE
Status: COMPLETED | OUTPATIENT
Start: 2022-04-05 | End: 2022-04-05

## 2022-04-05 RX ORDER — FUROSEMIDE 10 MG/ML
40 INJECTION INTRAMUSCULAR; INTRAVENOUS 2 TIMES DAILY
Status: DISCONTINUED | OUTPATIENT
Start: 2022-04-05 | End: 2022-04-06

## 2022-04-05 RX ORDER — LORAZEPAM 2 MG/ML
INJECTION INTRAMUSCULAR
Status: DISPENSED
Start: 2022-04-05 | End: 2022-04-05

## 2022-04-05 RX ORDER — PANTOPRAZOLE SODIUM 40 MG/10ML
40 INJECTION, POWDER, LYOPHILIZED, FOR SOLUTION INTRAVENOUS DAILY
Status: DISCONTINUED | OUTPATIENT
Start: 2022-04-05 | End: 2022-04-07

## 2022-04-05 RX ORDER — HEPARIN SODIUM 1000 [USP'U]/ML
1700 INJECTION, SOLUTION INTRAVENOUS; SUBCUTANEOUS ONCE
Status: COMPLETED | OUTPATIENT
Start: 2022-04-05 | End: 2022-04-05

## 2022-04-05 RX ORDER — LORAZEPAM 2 MG/ML
0.5 INJECTION INTRAMUSCULAR ONCE
Status: COMPLETED | OUTPATIENT
Start: 2022-04-05 | End: 2022-04-05

## 2022-04-05 RX ADMIN — LEVALBUTEROL 1.25 MG: 1.25 SOLUTION, CONCENTRATE RESPIRATORY (INHALATION) at 08:31

## 2022-04-05 RX ADMIN — METOPROLOL TARTRATE 25 MG: 25 TABLET, FILM COATED ORAL at 11:11

## 2022-04-05 RX ADMIN — LEVALBUTEROL 1.25 MG: 1.25 SOLUTION, CONCENTRATE RESPIRATORY (INHALATION) at 06:01

## 2022-04-05 RX ADMIN — LEVALBUTEROL 1.25 MG: 1.25 SOLUTION, CONCENTRATE RESPIRATORY (INHALATION) at 15:46

## 2022-04-05 RX ADMIN — FUROSEMIDE 20 MG: 10 INJECTION, SOLUTION INTRAMUSCULAR; INTRAVENOUS at 06:25

## 2022-04-05 RX ADMIN — PIPERACILLIN AND TAZOBACTAM 3375 MG: 3; .375 INJECTION, POWDER, FOR SOLUTION INTRAVENOUS at 02:48

## 2022-04-05 RX ADMIN — LEVALBUTEROL 1.25 MG: 1.25 SOLUTION, CONCENTRATE RESPIRATORY (INHALATION) at 19:24

## 2022-04-05 RX ADMIN — FUROSEMIDE 40 MG: 10 INJECTION, SOLUTION INTRAMUSCULAR; INTRAVENOUS at 08:44

## 2022-04-05 RX ADMIN — CLOPIDOGREL BISULFATE 75 MG: 75 TABLET ORAL at 11:15

## 2022-04-05 RX ADMIN — SODIUM CHLORIDE: 4.5 INJECTION, SOLUTION INTRAVENOUS at 10:24

## 2022-04-05 RX ADMIN — LORAZEPAM 0.5 MG: 2 INJECTION INTRAMUSCULAR; INTRAVENOUS at 05:52

## 2022-04-05 RX ADMIN — QUETIAPINE FUMARATE 25 MG: 25 TABLET ORAL at 20:47

## 2022-04-05 RX ADMIN — ATORVASTATIN CALCIUM 40 MG: 40 TABLET, FILM COATED ORAL at 20:47

## 2022-04-05 RX ADMIN — Medication 125 MG: at 17:39

## 2022-04-05 RX ADMIN — CEFTRIAXONE 2000 MG: 2 INJECTION, POWDER, FOR SOLUTION INTRAMUSCULAR; INTRAVENOUS at 10:06

## 2022-04-05 RX ADMIN — PREDNISONE 5 MG: 5 TABLET ORAL at 11:15

## 2022-04-05 RX ADMIN — QUETIAPINE FUMARATE 25 MG: 25 TABLET ORAL at 11:15

## 2022-04-05 RX ADMIN — METHYLPREDNISOLONE SODIUM SUCCINATE 40 MG: 40 INJECTION, POWDER, FOR SOLUTION INTRAMUSCULAR; INTRAVENOUS at 02:46

## 2022-04-05 RX ADMIN — POTASSIUM CHLORIDE 20 MEQ: 400 INJECTION, SOLUTION INTRAVENOUS at 08:46

## 2022-04-05 RX ADMIN — PANTOPRAZOLE SODIUM 40 MG: 40 INJECTION, POWDER, FOR SOLUTION INTRAVENOUS at 11:11

## 2022-04-05 RX ADMIN — POTASSIUM CHLORIDE 20 MEQ: 400 INJECTION, SOLUTION INTRAVENOUS at 10:05

## 2022-04-05 RX ADMIN — METOPROLOL TARTRATE 25 MG: 25 TABLET, FILM COATED ORAL at 20:47

## 2022-04-05 RX ADMIN — SODIUM CHLORIDE, PRESERVATIVE FREE 10 ML: 5 INJECTION INTRAVENOUS at 20:48

## 2022-04-05 RX ADMIN — LEVALBUTEROL 1.25 MG: 1.25 SOLUTION, CONCENTRATE RESPIRATORY (INHALATION) at 11:37

## 2022-04-05 RX ADMIN — FUROSEMIDE 40 MG: 10 INJECTION, SOLUTION INTRAMUSCULAR; INTRAVENOUS at 17:39

## 2022-04-05 RX ADMIN — HEPARIN SODIUM 1700 UNITS: 1000 INJECTION INTRAVENOUS; SUBCUTANEOUS at 06:24

## 2022-04-05 ASSESSMENT — PAIN SCALES - GENERAL
PAINLEVEL_OUTOF10: 0

## 2022-04-05 NOTE — FLOWSHEET NOTE
04/05/22 0630   Vital Signs   Temp 99 °F (37.2 °C)   Temp Source Bladder   Pulse 115   Heart Rate Source Monitor   Resp 28   BP (!) 141/80   BP Location Left upper arm   MAP (mmHg) 96   Level of Consciousness Alert (0)   MEWS Score 4   Oxygen Therapy   SpO2 97 %   O2 Device PAP (positive airway pressure)   FiO2  60 %   Ativan 0.5mg IV effective, patient calm, on bipap, frothing at mouth, CXR done, 20 mg IV lasix given, anti-xa low result, heparin bolus given, rate increased, IVF stopped, troponin/BNP stat in progress, EKG complete. Patient resting at present, bond emptied, will continue to monitor, large watery stools tonight.

## 2022-04-05 NOTE — PROGRESS NOTES
04/04/22 7590   NIV Type   Mode Bilevel   Mask Type Full face mask   Mask Size Small   Settings/Measurements   IPAP 14 cmH20   CPAP/EPAP 7 cmH2O   Rate Ordered 12   Resp 26   FiO2  30 %   Vt Exhaled 444 mL   Comfort Level Good   SpO2 98

## 2022-04-05 NOTE — PROGRESS NOTES
Anit-Xa results noted. No changes made to Heparin gtt. Pt is on 4 liters per NC and is brice @ this time. Will place on BiPap as needed. Results for Yulia Clayton (MRN [id number]) as of 4/5/2022 18:25   Ref.  Range 4/5/2022 17:55   Anti-XA Unfrac Heparin Latest Ref Range: 0.30 - 0.70 IU/mL 0.51

## 2022-04-05 NOTE — PROGRESS NOTES
Hospitalist Progress Note      PCP: Katie Gómez MD    Date of Admission: 3/30/2022    Chief Complaint: [facility] Course: 81f with end stage copd on 2.5 lpm NC O2 at home, admitted with sepsis, E coli bacteremia, uti, developed afib with rvr, placed on heparin. Required intubation 4/2 for reportedly acidosis, extubated yesterday. Subjective: Called to see patient due to worsening hypoxemia/ hypertension, afib rvr after discontinuing bipap.  Patient denies chest pain, headache, is on bipap tachypneic and tachycardic, responsive      Medications:  Reviewed    Infusion Medications    lactated ringers 100 mL/hr at 04/04/22 2025    heparin (PORCINE) Infusion 940 Units/hr (04/04/22 2342)    norepinephrine Stopped (04/03/22 0520)    sodium chloride      dextrose      sodium chloride       Scheduled Medications    LORazepam        QUEtiapine  25 mg Oral BID    metoprolol tartrate  25 mg Oral BID    piperacillin-tazobactam  3,375 mg IntraVENous Q8H    methylPREDNISolone  40 mg IntraVENous Q8H    levalbuterol  1.25 mg Nebulization 4x daily    insulin lispro  0-6 Units SubCUTAneous Q4H    fluticasone  1 spray Each Nostril Daily    vancomycin  125 mg Oral 4 times per day    atorvastatin  40 mg Oral Nightly    clopidogrel  75 mg Oral Daily    ferrous sulfate  325 mg Oral Daily with breakfast    sertraline  50 mg Oral Daily    sodium chloride flush  5-40 mL IntraVENous 2 times per day    pantoprazole  40 mg Oral Daily    predniSONE  5 mg Oral Daily with breakfast     PRN Meds: potassium chloride, magnesium sulfate, sodium chloride, heparin (porcine), heparin (porcine), levalbuterol, sodium chloride, glucose, glucagon (rDNA), dextrose, dextrose bolus (hypoglycemia) **OR** dextrose bolus (hypoglycemia), sodium chloride flush, sodium chloride, ondansetron **OR** ondansetron, polyethylene glycol, acetaminophen **OR** acetaminophen      Intake/Output Summary (Last 24 hours) at 4/5/2022 4154  Last data filed at 4/4/2022 2304  Gross per 24 hour   Intake 1871.43 ml   Output 1600 ml   Net 271.43 ml       Physical Exam Performed:    BP (!) 214/146   Pulse 161   Temp 99.1 °F (37.3 °C) (Bladder)   Resp (!) 36   Ht 5' 4\" (1.626 m)   Wt 127 lb 14.4 oz (58 kg)   SpO2 91%   BMI 21.95 kg/m²     General appearance: No apparent distress, appears stated age and cooperative. HEENT: Pupils equal, round, and reactive to light. Conjunctivae/corneas clear. Neck: Supple, with full range of motion. No jugular venous distention. Trachea midline. Respiratory: Tolerating bipap, tachypneic, decareased breath sounds bilaterally, no rhonchi or wheezing bilaterally  Cardiovascular: tachy rate, regular rhythm  Abdomen: Soft, non-tender, non-distended w   Musculoskeletal: No clubbing, cyanosis (+) trace edema bilaterally. No calf tenderness    Skin: Skin color, texture, turgor normal.          Labs:   Recent Labs     04/03/22  0600 04/04/22  0440 04/05/22  0500   WBC 7.1 8.6 9.9   HGB 9.1* 9.3* 9.6*   HCT 27.5* 28.3* 29.6*    198 222     Recent Labs     04/03/22  0600 04/04/22  0440 04/05/22  0500    146* 147*   K 2.6* 2.9* 3.3*    109 110   CO2 27 29 27   BUN 27* 27* 24*   CREATININE 1.3* 1.2 1.1   CALCIUM 8.1* 8.5 8.7     No results for input(s): AST, ALT, BILIDIR, BILITOT, ALKPHOS in the last 72 hours. No results for input(s): INR in the last 72 hours. Recent Labs     04/02/22  1005   TROPONINI 0.11*       Urinalysis:      Lab Results   Component Value Date    NITRU POSITIVE 03/30/2022    WBCUA  03/30/2022    BACTERIA 3+ 03/30/2022    RBCUA 3-4 03/30/2022    BLOODU MODERATE 03/30/2022    SPECGRAV 1.015 03/30/2022    GLUCOSEU Negative 03/30/2022       Radiology:  XR CHEST PORTABLE   Final Result   Line and tubes as above. Stable appearance of bilateral interstitial opacities. XR CHEST PORTABLE   Final Result   1.  Suboptimal examination due to multiple extraneous wires projecting over   the patient's chest. It appears devices are in similar position from prior. 2. Bilateral bronchial wall thickening with slightly increased   reticulonodular opacities which may reflect evolving infectious/inflammatory   airways process. XR CHEST PORTABLE   Final Result   Satisfactory line and tube placement. CT ABDOMEN PELVIS WO CONTRAST Additional Contrast? None   Final Result   Mild body wall anasarca, small pleural effusions, and small amount of   abdominal and pelvic ascites, compatible with fluid overload      Scattered areas of colonic wall thickening are seen, either due to the   partially contracted state of the colon or wall thickening from underlying   colitis      Posterior fundal fibroid versus thickening of the endometrial stripe. Recommend follow-up non urgent pelvic ultrasound for further characterization. XR CHEST PORTABLE   Final Result   Interval placement of a left IJ approach central venous catheter with the tip   overlying the mid SVC, with no pneumothorax identified.          XR CHEST PORTABLE   Final Result   Bilateral ill-defined linear pulmonary opacities could represent pulmonary   edema or infection         XR CHEST PORTABLE    (Results Pending)           Assessment/Plan:    Active Hospital Problems    Diagnosis Date Noted    Atrial fibrillation with RVR ([facility]) [I48.91]     Electrolyte disorder [E87.8]     Tachycardia [R00.0]     Acute hypoxemic respiratory failure (HCC) [J96.01]     Septic shock ([facility]) [A41.9, R65.21]     Bacteremia [R78.81]     Urinary tract infection without hematuria [N39.0]     Sepsis ([address] [A41.9] 03/30/2022    S/P CABG x 2 [Z95.1] 09/25/2019    Elevated troponin [R77.8] 09/04/2019     Acute on chronic hypoxemic resp failure  - suspect fluid overload  - checking bnp, stat cxr, giving lasix  - extubated yesterday, was tolerating bipap, distress occurring apporx 5 minutes after discontinuing bipap  - EKG with anterospetal q waves, trend trop  - back on bipap,  Ativan x 1    Sepsis  - continue IV zosyn  - on oral vanc for lbm / recent bout of c diff    COPD  - pCO2 38.9, continue solumedrol, HHN    New Afib  - anticoagulating  - lopressor bid    DVT Prophylaxis: anticoagulated  Diet: ADULT DIET;  Dysphagia - Pureed  Code Status: Full Code         Dispo - icu  Tot crit care time > 30 min    Melissa Pastrana MD

## 2022-04-05 NOTE — PROGRESS NOTES
Hospitalist Progress Note      PCP: Ambrose Kanner, MD    Date of Admission: 3/30/2022    Chief Complaint: SOB from home via EMS. Hx of end stage COPD. Home o2 dependent - reports 2.5L baseline - increases to 3-4L when SOB     Recently treated for Cdiff diarrhea. Reports she finished Abx about a week ago - per home care notes Vanc was extended from 3/24-3/29 after the initial treatment and she finished it on 3/29. Pt reports foul smelling urine but denies dysuria. Subjective:     4/2  resp status worsened through the morning. Intubated urgently at bedside due to severe mixed resp and metabolic acidemia. 4/3  Intubated and sedated. Off levophed today. O2 sats 100% on FiO2 35% PEEP 5. Clinically appears much better and lung exam improved. 4/4-patient is extubated around noon today. Completely awake and alert. On 3 L of oxygen. Doing well. She is hungry. She wants to eat. Minimally confused. 4/5-patient is extubated 3 L yesterday. She became more hypoxic. She developed rapid A. fib. She was felt to be in fluid overload. She was given IV Lasix. Presently on BiPAP. Has started diuresing.       Medications:  Reviewed    Infusion Medications    sodium chloride 50 mL/hr at 04/05/22 1024    heparin (PORCINE) Infusion 1,060 Units/hr (04/05/22 0637)    norepinephrine Stopped (04/03/22 0520)    sodium chloride      dextrose      sodium chloride       Scheduled Medications    LORazepam        furosemide  40 mg IntraVENous BID    cefTRIAXone (ROCEPHIN) IV  2,000 mg IntraVENous Q24H    pantoprazole  40 mg IntraVENous Daily    QUEtiapine  25 mg Oral BID    metoprolol tartrate  25 mg Oral BID    levalbuterol  1.25 mg Nebulization 4x daily    insulin lispro  0-6 Units SubCUTAneous Q4H    fluticasone  1 spray Each Nostril Daily    vancomycin  125 mg Oral 4 times per day    atorvastatin  40 mg Oral Nightly    clopidogrel  75 mg Oral Daily    ferrous sulfate 325 mg Oral Daily with breakfast    sertraline  50 mg Oral Daily    sodium chloride flush  5-40 mL IntraVENous 2 times per day    predniSONE  5 mg Oral Daily with breakfast     PRN Meds: potassium chloride, magnesium sulfate, sodium chloride, heparin (porcine), heparin (porcine), levalbuterol, sodium chloride, glucose, glucagon (rDNA), dextrose, dextrose bolus (hypoglycemia) **OR** dextrose bolus (hypoglycemia), sodium chloride flush, sodium chloride, ondansetron **OR** ondansetron, polyethylene glycol, acetaminophen **OR** acetaminophen      Intake/Output Summary (Last 24 hours) at 4/5/2022 1358  Last data filed at 4/5/2022 1200  Gross per 24 hour   Intake 3077.85 ml   Output 5050 ml   Net -1972.15 ml       Physical Exam Performed:    /73   Pulse 90   Temp 98.3 °F (36.8 °C) (Bladder)   Resp 24   Ht 5' 4\" (1.626 m)   Wt 127 lb 14.4 oz (58 kg)   SpO2 91%   BMI 21.95 kg/m²     General appearance: Awake and alert. On BiPAP. Ill-appearing  HEENT: Pupils equal, round, and reactive to light. Conjunctivae/corneas clear. Neck: Supple, with full range of motion. No jugular venous distention. Trachea midline. Respiratory: Diminished breath sounds the bases. Few crackles. No wheezes or rhonchi. Cardiovascular: Regular rate and rhythm with normal S1/S2 without murmurs, rubs or gallops. Abdomen: Soft, non-tender, non-distended with normal bowel sounds. Musculoskeletal: No clubbing, cyanosis or edema bilaterally. Full range of motion without deformity. Skin: Skin color, texture, turgor normal.  No rashes or lesions. Neurologic:  Intubated and sedated.    Capillary Refill: Brisk,3 seconds, normal   Peripheral Pulses: +2 palpable, equal bilaterally       Labs:   Recent Labs     04/03/22  0600 04/04/22  0440 04/05/22  0500   WBC 7.1 8.6 9.9   HGB 9.1* 9.3* 9.6*   HCT 27.5* 28.3* 29.6*    198 222     Recent Labs     04/03/22  0600 04/03/22  0600 04/04/22  0440 04/05/22  0500 04/05/22  1205    --  146* 147*  --    K 2.6*   < > 2.9* 3.3* 4.4     --  109 110  --    CO2 27  --  29 27  --    BUN 27*  --  27* 24*  --    CREATININE 1.3*  --  1.2 1.1  --    CALCIUM 8.1*  --  8.5 8.7  --     < > = values in this interval not displayed. No results for input(s): AST, ALT, BILIDIR, BILITOT, ALKPHOS in the last 72 hours. No results for input(s): INR in the last 72 hours. Recent Labs     04/05/22  0500 04/05/22  0850 04/05/22  1205   TROPONINI 0.05* 0.04* 0.04*       Urinalysis:      Lab Results   Component Value Date    NITRU POSITIVE 03/30/2022    WBCUA  03/30/2022    BACTERIA 3+ 03/30/2022    RBCUA 3-4 03/30/2022    BLOODU MODERATE 03/30/2022    SPECGRAV 1.015 03/30/2022    GLUCOSEU Negative 03/30/2022       Radiology:  XR CHEST PORTABLE   Final Result   Interval extubation with increasing interstitial airspace opacities   throughout. XR CHEST PORTABLE   Final Result   Line and tubes as above. Stable appearance of bilateral interstitial opacities. XR CHEST PORTABLE   Final Result   1. Suboptimal examination due to multiple extraneous wires projecting over   the patient's chest. It appears devices are in similar position from prior. 2. Bilateral bronchial wall thickening with slightly increased   reticulonodular opacities which may reflect evolving infectious/inflammatory   airways process. XR CHEST PORTABLE   Final Result   Satisfactory line and tube placement. CT ABDOMEN PELVIS WO CONTRAST Additional Contrast? None   Final Result   Mild body wall anasarca, small pleural effusions, and small amount of   abdominal and pelvic ascites, compatible with fluid overload      Scattered areas of colonic wall thickening are seen, either due to the   partially contracted state of the colon or wall thickening from underlying   colitis      Posterior fundal fibroid versus thickening of the endometrial stripe.    Recommend follow-up non urgent pelvic ultrasound for further characterization. XR CHEST PORTABLE   Final Result   Interval placement of a left IJ approach central venous catheter with the tip   overlying the mid SVC, with no pneumothorax identified. XR CHEST PORTABLE   Final Result   Bilateral ill-defined linear pulmonary opacities could represent pulmonary   edema or infection                 Assessment/Plan:    Principal Problem:    Sepsis ([address]  Active Problems:    Elevated troponin    S/P CABG x 2    Tachycardia    Acute hypoxemic respiratory failure (HCC)    Septic shock (HCC)    Bacteremia    Urinary tract infection without hematuria    Atrial fibrillation with RVR (HCC)    Electrolyte disorder  Resolved Problems:    * No resolved hospital problems. *       Sepsis with severe sepsis and septic shock. Diarrhea - recent Cdiff infection, though Cdiff negative this admission. Bacteremia - Ecoli (MDRO) on blood cultures  And also on urine culture from 3/30   Completed 4 days of Zosyn and now on IV Rocephin. Empiric PO vanc - will need to be on this while on other Abx and for at least 10 days after due to recent Cdiff. She is off vasopressors. 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 Acute on chronic hypoxic resp failure. Decompensated 4/2 and intubated urgently. Extubated on 4/4/2022. Solumedrol IV intially. Now stopped. On Prednisone 5 mg daily. Xopenex due to severe sinus tachycardia. -Developed worsening respiratory failure and was placed on BiPAP  -Fluid overloaded. Received Lasix which helped. Start Lasix 40 IV twice daily. Started on half-normal saline for hyponatremia. Possibly Afib   Sinus tachy this am.   Off dilt gtt. On heparin gtt. Acute Acidemia 4/2 - much better now   pH 7.08, mixed respiratory and metabolic acidosis resulting in severe acidemia. 1 amp of bicarb   Intubated 4/2. Extubated on 4/4/2022. On Nasal cannula oxygen. Hypokalemia. Replace electrolytes. Mood disorder on zoloft. CAD  On plavix. BB off due to septic shock. Statin on hold. Hx of GI bleed. Cont PPI. Monitor Hgb. Mod to severe protein calorie malnutrition. Supplement as tolerates. -Speech therapy evaluation. Start diet after that. DVT Prophylaxis: heparin  Diet: ADULT DIET; Dysphagia - Pureed  Code Status: Full Code        Prior code status discussions - pt and family insist on full code.             Lady Tres MD 4/5/2022 1:58 PM

## 2022-04-05 NOTE — PROGRESS NOTES
Low dose Heparin GTT:  Anti-Xa at 1800 check is therapeutic at 0.51IU/mL. Continue infusion at 10.6mL/hr and recheck the Anti-Xa with AM labs 4/6/22.   MARIO ALBERTO Dawson INESSA [facility] PharmD 4/5/2022 6:26 PM

## 2022-04-05 NOTE — PROGRESS NOTES
04/05/22 0603   NIV Type   Mode Bilevel   Mask Type Full face mask   Mask Size Small   Settings/Measurements   IPAP 18 cmH20   CPAP/EPAP 8 cmH2O   Rate Ordered 12   Resp 29   FiO2  60 %   Vt Exhaled 326 mL   Comfort Level Good   SpO2 96

## 2022-04-05 NOTE — PROGRESS NOTES
04/05/22 0306   NIV Type   Mode Bilevel   Mask Type Full face mask   Mask Size Small   Settings/Measurements   IPAP 14 cmH20   CPAP/EPAP 7 cmH2O   Rate Ordered 12   Resp 30   FiO2  30 %   Vt Exhaled 452 mL   Comfort Level Good   SpO2 97

## 2022-04-05 NOTE — PROGRESS NOTES
Pharmacy - RE:  Low-dose Heparin drip  Current rate = 9.4 ml/h  (940 units/h)  Anti-Xa drawn @ 0500 = 0.29 IU/ml  Goal Anti-Xa = 0.3 - 0.7 IU/ml  Per protocol, bolus with 1700 units, increase rate to 1060 units/hr, and obtain another Anti-Xa 4/5 @ 1230.     Lilian Madison PharmD, [facility], 4/5/2022 6:14 AM

## 2022-04-05 NOTE — FLOWSHEET NOTE
04/04/22 2000   Vital Signs   Temp 99 °F (37.2 °C)   Temp Source Bladder   Pulse 103   Heart Rate Source Monitor   Resp 24   /79   BP Location Left upper arm   MAP (mmHg) 94   Level of Consciousness Alert (0)   MEWS Score 3   Oxygen Therapy   SpO2 100 %   O2 Device Nasal cannula   O2 Flow Rate (L/min) 3 L/min   Patient extubated today, currently on home dose of 3 liters NC. Lung sounds clear and diminished but has moist congested cough, SOB with exertion. Edema BUE arms, multiple bruising, left arm weeping. Legs elevated on pillow, declines turn. Flexi seal rectal tube remains in place watery stools noted bag nearly full, Bill urine clear yellow with small sediment noted, IVF infusing, ATB infusing, heparin infusing as ordered. Requests anxiety medications for bed. Asks about BiPAP, will message hospitalist for BiPAP order.

## 2022-04-05 NOTE — FLOWSHEET NOTE
04/05/22 0530   Vital Signs   Pulse 161   Resp (!) 36   BP (!) 214/146   MAP (mmHg) 166   Oxygen Therapy   SpO2 91 %   Patient BiPAP removed after labs complete, placed on oxygen per NC, did well for about 5 minutes then became anxious, HR elevated, BP elevated, messaged Dr. Fadumo Chew for anxiety medication, awaiting response, clinical notified, charge nurse in room, messaging doctor. BiPAP was placed at 100%, patient had dropped to 77%. Now 98%.

## 2022-04-05 NOTE — FLOWSHEET NOTE
04/05/22 0100   Vital Signs   Pulse 104   Resp 23   BP (!) 148/84   MAP (mmHg) 105   Oxygen Therapy   SpO2 96 %   O2 Device PAP (positive airway pressure)   FiO2  30 %   Patient requested break off bipap, wanted a drink of water, BiPAP removed, oxygen placed per NC, patient SaO2 was 97%, states she is SOB, wanted to go back on BiPAP, mouth swabbed, no drink given, will continue to monitor.

## 2022-04-05 NOTE — PROGRESS NOTES
Good UOP past lasix dose, urine clear pale yellow. Able to turn patient to do olivier care. 4 Eyes Skin Assessment     The patient is being assess for   Shift Handoff    I agree that 2 RN's have performed a thorough Head to Toe Skin Assessment on the patient. ALL assessment sites listed below have been assessed. Areas assessed for pressure by both nurses:   Tia Murray RN  [x]   Head, Face, and Ears   [x]   Shoulders, Back, and Chest, Abdomen  [x]   Arms, Elbows, and Hands   [x]   Coccyx, Sacrum, and Ischium  [x]   Legs, Feet, and Heels        Skin Assessed Under all Medical Devices by both nurses:  Bipap mask and rectal tube    bond, mipilex on coccyx    All Mepilex Borders were peeled back and area peeked at by both nurses:  Yes  Please list where Mepilex Borders are located:  Coccyx, was replaced. Blanchable redness buttock and elbows, slight excoriation near rectum. **SHARE this note so that the co-signing nurse is able to place an eSignature**  Co-signer eSignature: Electronically signed by Louie Patel RN on 4/5/2022 at 11:54 AM    Does the Patient have Skin Breakdown related to pressure?   No            Trey Prevention initiated:  Yes   Wound Care Orders initiated:  NA      [facility] nurse consulted for Pressure Injury (Stage 3,4, Unstageable, DTI, NWPT, Complex wounds)and New or Established Ostomies:  NA      Primary Nurse eSignature: Electronically signed by Arleen Kent RN on 4/5/22 at 7:58 AM EDT

## 2022-04-05 NOTE — PROGRESS NOTES
Pharmacy - RE:  Low-dose Heparin drip  Current rate = 10.6ml/h  (1060units/h)  Anti-Xa drawn @ 1205 = 0.62 IU/ml  Goal Anti-Xa = 0.3 - 0.7 IU/ml  Per protocol, continue with 1060 units/hr, and obtain another Anti-Xa 4/5 @ 1800.   Artur Ruth Pharm D 4/5/20221:01 PM  .        Haydee Palmer, PharmD, [facility], 4/5/2022 6:14 AM

## 2022-04-05 NOTE — PROGRESS NOTES
Noon lab results noted. No changes in the Heparin gtt. Results for Pati Stokes (MRN [id number]) as of 4/5/2022 13:39   Ref.  Range 4/5/2022 12:05   Potassium Latest Ref Range: 3.5 - 5.1 mmol/L 4.4   Magnesium Latest Ref Range: 1.80 - 2.40 mg/dL 2.00   Troponin Latest Ref Range: <0.01 ng/mL 0.04 (H)   Anti-XA Unfrac Heparin Latest Ref Range: 0.30 - 0.70 IU/mL 0.62

## 2022-04-05 NOTE — PROGRESS NOTES
Pharmacy - RE:  Low-dose Heparin drip  Current rate = 940 ml/h  (9.4 units/h)  Anti-Xa drawn @ 2324 = 0.31 IU/ml  Goal Anti-Xa = 0.3 - 0.7 IU/ml  Per protocol, continue current rate and obtain another Anti-Xa 4/5 @ 0600.     Leandro HeckD, [facility], 4/4/2022 11:35 PM

## 2022-04-05 NOTE — PROGRESS NOTES
Speech Language Pathology  Attempt Note    Attempted to see pt for dysphagia tx. Pt is with radiology and not availble for ST at this time. . ST to f/u at later date as pt is appropriate and able to engage on therapy activities. Fitz Renteria. Reshma Alexander M.A.  [address] [facility]  Speech-Language Pathologist

## 2022-04-05 NOTE — PROGRESS NOTES
Pulmonary & Critical Care Medicine ICU Progress Note    CC: Sepsis    Events of Last 24 hours:   Extubated to 3 L NC yesterday  Overnight the pt c/o SOB and developed Afib RVR. Given lasix. She is not hypoxic. She is on Bipap and did not tolerate coming off. Vascular lines: IV: L IJ  3/31    Mechanical ventilation 4/2-4/4/22    Vent Mode: PS Rate Set: 0 bmp/Vt Ordered: 330 mL/ /FiO2 : 60 %  Recent Labs     04/04/22  1003 04/05/22  0510   PHART 7.502* 7.439   TKN1DGG 34.4* 38.9   PO2ART 134.3* 83.9       IV:   heparin (PORCINE) Infusion 1,060 Units/hr (04/05/22 2512)    norepinephrine Stopped (04/03/22 0520)    sodium chloride      dextrose      sodium chloride         Vitals:  Blood pressure (!) 144/72, pulse 98, temperature 99 °F (37.2 °C), temperature source Bladder, resp. rate 29, height 5' 4\" (1.626 m), weight 127 lb 14.4 oz (58 kg), SpO2 100 %, not currently breastfeeding. on vent  General: ill appearing. Intubated sedated. Eyes: PERRL. No sclera icterus. No conjunctival injection. ENT: No discharge. Pharynx clear. Neck: Trachea midline. Normal thyroid. Resp: No accessory muscle use. Few crackles. No wheezing. No rhonchi. CV: Regular rate. Regular rhythm. No mumur or rub. 1+ LE edema. GI: Non-tender. Non-distended. No masses. Skin: Warm and dry. No nodule on exposed extremities. No rash on exposed extremities. Neuro: Moves all extremities. Follows commands.   Psych:calm, lethargic but easily awakened to follow commands      Scheduled Meds:   LORazepam        QUEtiapine  25 mg Oral BID    metoprolol tartrate  25 mg Oral BID    piperacillin-tazobactam  3,375 mg IntraVENous Q8H    methylPREDNISolone  40 mg IntraVENous Q8H    levalbuterol  1.25 mg Nebulization 4x daily    insulin lispro  0-6 Units SubCUTAneous Q4H    fluticasone  1 spray Each Nostril Daily    vancomycin  125 mg Oral 4 times per day    atorvastatin  40 mg Oral Nightly    clopidogrel  75 mg Oral Daily    ferrous lasix 40mg IV BID  Start 1/2 NS at 50cc/hr for hypernatremia  Zosyn D#4; change to CTX for 3 more days  Continue Vanc  mg PO QID for 7 days following completion of antibiotics. Completed Ceftriaxone D#3   IV Levophed to keep MAP > 65 mmHg or SBP>90  Follow up Cx   Aspirin, Plavix, beta-blocker, cardiology following   Cardiology following   Blood sugar control, with goal 140-180  DVT prophylaxis:  Heparin drip  Total critical care time caring for this patient with life threatening, unstable organ failure, including direct patient contact, management of life support systems, review of data including imaging and labs, discussions with other team members and physicians is 31 minutes, excluding procedures.

## 2022-04-05 NOTE — FLOWSHEET NOTE
04/04/22 2330   Vital Signs   Temp 99.1 °F (37.3 °C)   Temp Source Bladder   Pulse 94   Heart Rate Source Monitor   Resp 25   BP (!) 153/82   BP Location Left upper arm   MAP (mmHg) 100   Level of Consciousness Alert (0)   MEWS Score 2   Oxygen Therapy   SpO2 98 %   O2 Device PAP (positive airway pressure)   FiO2  30 %   Patient doing much better on BiPAP, feeling better, course cough noted, will continue to monitor. New bag of heparin infusing, awaiting anti-xa result.

## 2022-04-05 NOTE — PROGRESS NOTES
Course cough, patient \"doesn't feel good, SaO2 97% on 3 liters, fan on, labored breathing, RT to place on bipap, labs drawn for anti-Xa, extra green top sent down to lab. Will continue to monitor. , Respirations 31, /98.

## 2022-04-05 NOTE — PROGRESS NOTES
CM-ST w/freq PAC's, VSS, pt remains afebrile. UO WNL & pt diuresed will after Lasix. Pt placed on 4 liters NC & pt tolerated for about 1 1/2hrs. Then she becomes tachypenic, & noted increase of BP & HR. BiPap replaced on prev. Settings. Will continue monitor.

## 2022-04-06 LAB
ANION GAP SERPL CALCULATED.3IONS-SCNC: 10 MMOL/L (ref 3–16)
ANTI-XA UNFRAC HEPARIN: 0.39 IU/ML (ref 0.3–0.7)
BASOPHILS ABSOLUTE: 0 K/UL (ref 0–0.2)
BASOPHILS RELATIVE PERCENT: 0 %
BUN BLDV-MCNC: 24 MG/DL (ref 7–20)
CALCIUM SERPL-MCNC: 8.9 MG/DL (ref 8.3–10.6)
CHLORIDE BLD-SCNC: 102 MMOL/L (ref 99–110)
CO2: 35 MMOL/L (ref 21–32)
CREAT SERPL-MCNC: 1.1 MG/DL (ref 0.6–1.2)
EOSINOPHILS ABSOLUTE: 0.1 K/UL (ref 0–0.6)
EOSINOPHILS RELATIVE PERCENT: 1 %
GFR AFRICAN AMERICAN: 58
GFR NON-AFRICAN AMERICAN: 48
GLUCOSE BLD-MCNC: 102 MG/DL (ref 70–99)
GLUCOSE BLD-MCNC: 118 MG/DL (ref 70–99)
GLUCOSE BLD-MCNC: 132 MG/DL (ref 70–99)
GLUCOSE BLD-MCNC: 162 MG/DL (ref 70–99)
GLUCOSE BLD-MCNC: 80 MG/DL (ref 70–99)
GLUCOSE BLD-MCNC: 88 MG/DL (ref 70–99)
GLUCOSE BLD-MCNC: 89 MG/DL (ref 70–99)
HCT VFR BLD CALC: 29.1 % (ref 36–48)
HEMOGLOBIN: 9.5 G/DL (ref 12–16)
LYMPHOCYTES ABSOLUTE: 1.3 K/UL (ref 1–5.1)
LYMPHOCYTES RELATIVE PERCENT: 12 %
MCH RBC QN AUTO: 29.5 PG (ref 26–34)
MCHC RBC AUTO-ENTMCNC: 32.6 G/DL (ref 31–36)
MCV RBC AUTO: 90.3 FL (ref 80–100)
MONOCYTES ABSOLUTE: 0.5 K/UL (ref 0–1.3)
MONOCYTES RELATIVE PERCENT: 5 %
MYELOCYTE PERCENT: 1 %
NEUTROPHILS ABSOLUTE: 8.9 K/UL (ref 1.7–7.7)
NEUTROPHILS RELATIVE PERCENT: 81 %
PDW BLD-RTO: 13.9 % (ref 12.4–15.4)
PERFORMED ON: ABNORMAL
PERFORMED ON: NORMAL
PLATELET # BLD: 225 K/UL (ref 135–450)
PLATELET SLIDE REVIEW: ADEQUATE
PMV BLD AUTO: 8.1 FL (ref 5–10.5)
POTASSIUM SERPL-SCNC: 3.1 MMOL/L (ref 3.5–5.1)
RBC # BLD: 3.23 M/UL (ref 4–5.2)
RBC # BLD: NORMAL 10*6/UL
SLIDE REVIEW: ABNORMAL
SODIUM BLD-SCNC: 147 MMOL/L (ref 136–145)
WBC # BLD: 10.9 K/UL (ref 4–11)

## 2022-04-06 PROCEDURE — 6370000000 HC RX 637 (ALT 250 FOR IP): Performed by: INTERNAL MEDICINE

## 2022-04-06 PROCEDURE — 94761 N-INVAS EAR/PLS OXIMETRY MLT: CPT

## 2022-04-06 PROCEDURE — 97166 OT EVAL MOD COMPLEX 45 MIN: CPT

## 2022-04-06 PROCEDURE — 80048 BASIC METABOLIC PNL TOTAL CA: CPT

## 2022-04-06 PROCEDURE — 2580000003 HC RX 258: Performed by: NURSE PRACTITIONER

## 2022-04-06 PROCEDURE — C9113 INJ PANTOPRAZOLE SODIUM, VIA: HCPCS | Performed by: INTERNAL MEDICINE

## 2022-04-06 PROCEDURE — 97530 THERAPEUTIC ACTIVITIES: CPT

## 2022-04-06 PROCEDURE — 94660 CPAP INITIATION&MGMT: CPT

## 2022-04-06 PROCEDURE — 85520 HEPARIN ASSAY: CPT

## 2022-04-06 PROCEDURE — 2580000003 HC RX 258: Performed by: INTERNAL MEDICINE

## 2022-04-06 PROCEDURE — 97162 PT EVAL MOD COMPLEX 30 MIN: CPT

## 2022-04-06 PROCEDURE — 92526 ORAL FUNCTION THERAPY: CPT

## 2022-04-06 PROCEDURE — 6360000002 HC RX W HCPCS: Performed by: INTERNAL MEDICINE

## 2022-04-06 PROCEDURE — 6370000000 HC RX 637 (ALT 250 FOR IP): Performed by: NURSE PRACTITIONER

## 2022-04-06 PROCEDURE — 97535 SELF CARE MNGMENT TRAINING: CPT

## 2022-04-06 PROCEDURE — 2060000000 HC ICU INTERMEDIATE R&B

## 2022-04-06 PROCEDURE — 85025 COMPLETE CBC W/AUTO DIFF WBC: CPT

## 2022-04-06 PROCEDURE — 94640 AIRWAY INHALATION TREATMENT: CPT

## 2022-04-06 PROCEDURE — 2700000000 HC OXYGEN THERAPY PER DAY

## 2022-04-06 PROCEDURE — 99233 SBSQ HOSP IP/OBS HIGH 50: CPT | Performed by: INTERNAL MEDICINE

## 2022-04-06 RX ORDER — FUROSEMIDE 10 MG/ML
40 INJECTION INTRAMUSCULAR; INTRAVENOUS DAILY
Status: DISCONTINUED | OUTPATIENT
Start: 2022-04-07 | End: 2022-04-09 | Stop reason: HOSPADM

## 2022-04-06 RX ORDER — BUSPIRONE HYDROCHLORIDE 5 MG/1
5 TABLET ORAL 3 TIMES DAILY
Status: DISCONTINUED | OUTPATIENT
Start: 2022-04-06 | End: 2022-04-09 | Stop reason: HOSPADM

## 2022-04-06 RX ADMIN — METOPROLOL TARTRATE 25 MG: 25 TABLET, FILM COATED ORAL at 08:24

## 2022-04-06 RX ADMIN — LEVALBUTEROL 1.25 MG: 1.25 SOLUTION, CONCENTRATE RESPIRATORY (INHALATION) at 19:35

## 2022-04-06 RX ADMIN — FLUTICASONE PROPIONATE 1 SPRAY: 50 SPRAY, METERED NASAL at 09:33

## 2022-04-06 RX ADMIN — LEVALBUTEROL 1.25 MG: 1.25 SOLUTION, CONCENTRATE RESPIRATORY (INHALATION) at 09:01

## 2022-04-06 RX ADMIN — PREDNISONE 5 MG: 5 TABLET ORAL at 08:24

## 2022-04-06 RX ADMIN — ATORVASTATIN CALCIUM 40 MG: 40 TABLET, FILM COATED ORAL at 20:02

## 2022-04-06 RX ADMIN — Medication 125 MG: at 12:22

## 2022-04-06 RX ADMIN — LEVALBUTEROL 1.25 MG: 1.25 SOLUTION, CONCENTRATE RESPIRATORY (INHALATION) at 16:11

## 2022-04-06 RX ADMIN — BUSPIRONE HYDROCHLORIDE 5 MG: 5 TABLET ORAL at 16:31

## 2022-04-06 RX ADMIN — Medication 125 MG: at 00:45

## 2022-04-06 RX ADMIN — ACETAMINOPHEN 650 MG: 325 TABLET ORAL at 17:58

## 2022-04-06 RX ADMIN — FUROSEMIDE 40 MG: 10 INJECTION, SOLUTION INTRAMUSCULAR; INTRAVENOUS at 08:39

## 2022-04-06 RX ADMIN — METOPROLOL TARTRATE 25 MG: 25 TABLET, FILM COATED ORAL at 20:02

## 2022-04-06 RX ADMIN — ACETAZOLAMIDE 250 MG: 500 INJECTION, POWDER, LYOPHILIZED, FOR SOLUTION INTRAVENOUS at 10:31

## 2022-04-06 RX ADMIN — Medication 125 MG: at 18:17

## 2022-04-06 RX ADMIN — QUETIAPINE FUMARATE 25 MG: 25 TABLET ORAL at 08:24

## 2022-04-06 RX ADMIN — SODIUM CHLORIDE, PRESERVATIVE FREE 10 ML: 5 INJECTION INTRAVENOUS at 20:02

## 2022-04-06 RX ADMIN — BUSPIRONE HYDROCHLORIDE 5 MG: 5 TABLET ORAL at 20:02

## 2022-04-06 RX ADMIN — SERTRALINE HYDROCHLORIDE 50 MG: 50 TABLET ORAL at 08:24

## 2022-04-06 RX ADMIN — POTASSIUM CHLORIDE 20 MEQ: 400 INJECTION, SOLUTION INTRAVENOUS at 08:20

## 2022-04-06 RX ADMIN — Medication 125 MG: at 05:45

## 2022-04-06 RX ADMIN — FERROUS SULFATE TAB 325 MG (65 MG ELEMENTAL FE) 325 MG: 325 (65 FE) TAB at 08:24

## 2022-04-06 RX ADMIN — QUETIAPINE FUMARATE 25 MG: 25 TABLET ORAL at 20:02

## 2022-04-06 RX ADMIN — LEVALBUTEROL 1.25 MG: 1.25 SOLUTION, CONCENTRATE RESPIRATORY (INHALATION) at 12:02

## 2022-04-06 RX ADMIN — PANTOPRAZOLE SODIUM 40 MG: 40 INJECTION, POWDER, FOR SOLUTION INTRAVENOUS at 08:23

## 2022-04-06 RX ADMIN — CEFTRIAXONE 2000 MG: 2 INJECTION, POWDER, FOR SOLUTION INTRAMUSCULAR; INTRAVENOUS at 08:30

## 2022-04-06 RX ADMIN — SODIUM CHLORIDE, PRESERVATIVE FREE 10 ML: 5 INJECTION INTRAVENOUS at 08:39

## 2022-04-06 RX ADMIN — CLOPIDOGREL BISULFATE 75 MG: 75 TABLET ORAL at 08:24

## 2022-04-06 RX ADMIN — HEPARIN SODIUM 1060 UNITS/HR: 10000 INJECTION, SOLUTION INTRAVENOUS; SUBCUTANEOUS at 04:44

## 2022-04-06 RX ADMIN — SODIUM CHLORIDE: 4.5 INJECTION, SOLUTION INTRAVENOUS at 04:50

## 2022-04-06 RX ADMIN — POTASSIUM BICARBONATE 40 MEQ: 391 TABLET, EFFERVESCENT ORAL at 10:29

## 2022-04-06 ASSESSMENT — PAIN SCALES - GENERAL
PAINLEVEL_OUTOF10: 0
PAINLEVEL_OUTOF10: 1
PAINLEVEL_OUTOF10: 4

## 2022-04-06 NOTE — PROGRESS NOTES
Patient requests off bipap, placed on oxygen per NC 4 liters, tolerating change. Able to give patient bath with minimum anxiety, tolerating intake, HR stable SR with PAC's, coffee given after swallow eval, does well. Bathed, linens changed, noted bottom of feet appear to be mottling. Heparin bag changed. Good urine output tonight. Call light in reach, will continue to monitor.

## 2022-04-06 NOTE — PROGRESS NOTES
Inpatient Physical Therapy Evaluation and Treatment    Unit: ICU  Date:  4/6/2022  Patient Name:    Lisa Navarro  Admitting diagnosis:  Septicemia [facility]) [A41.9]  Elevated troponin [R77.8]  NADJA (acute kidney injury) ([facility] [address] [N17.9]  Sepsis ([facility] [address] [A41.9]  Admit Date:  3/30/2022  Precautions/Restrictions/WB Status/ Lines/ Wounds/ Oxygen: Fall risk, Lines -Supplemental O2 (2.5L), Purewick catheter and L IJ, rectal tube, Bill, Telemetry and Continuous pulse oximetry    Treatment Time:  13:48-14:47  Treatment Number:  1   Timed Code Treatment Minutes: 49 minutes  Total Treatment Minutes:  59  minutes    Patient Goals for Therapy: go home \"I hope\"      Discharge Recommendations: CTA pending inc participation (pt refused OOB mobility / sitting up to EOB)  DME needs for discharge: TBD, likely no new needs       Therapy recommendation for EMS Transport: requires transport by cot due to unknown transfer capability. Therapy recommendations for staff:   Encourage bed-level ROM to all 4 limbs for edema control. History of Present Illness: Per H&P 3/30 Dr. Regulo Chavez: [de-identified] y.o. female with PMH below, presents with SOB, ZENDEJAS, increased O demand, productive cough. Pt has hx of COPD and is usually on 2L O2. She reports over the last couple of days she has been feeling increasingly SOB. Her O2 requirement has gone up to 4L. She was initially hypotensive and then later became hypertensive, markedy tachycardic/tachypnic. This was after IVF. She was give IV metoprolol. Her VS stabilized. She related this episode to anxiety. She denies CP. \"  Multiple admissions in 2021 at [facility] [place] and [facility] for acute on chronic COPD. Was at [facility] in late Fall 2021, returned home in December and has been home since. Home Health S4 Level Recommendation:  NA - CTA  AM-PAC Mobility Score       AM-PAC Inpatient Mobility without Stair Climbing Raw Score : 16    Preadmission Environment    Pt.  Lives with family (son and son's girlfriend, a retired nurse). They are present at night; during the day other family members stay with patient majority of time (~3 hours/day without assist). Family live \"up the road\". Home environment:  one story home  Steps to enter first floor: 1 steps to enter  Steps to second floor: N/A  Bathroom: tub/shower unit and standard height commode with toilet riser; grab bar in shower. Equipment owned: SPC, SW, wc (manual, electric (not working)), lift chair, BSC, hospital bed , shower chair, home O2 (2.5L) continous and lifealert     Preadmission Status:  Pt. Able to drive: No  Pt Fully independent with ADLs: No   Pt. Required assistance from family for: Bathing, Cleaning, Cooking, Dressing and Laundry    Family feeds pt, \"I get dizzy\"  Pt. independent for transfers and gait and walked with No Device for limited distances (room to room, sometimes down a short walkway outside the house). History of falls No    Pain   No  Location: NA  Rating: NA /10  Pain Medicine Status: No request made    Cognition    A&O Person, Place and Situation   Able to follow 2 step commands    Subjective  Patient lying supine in bed with no family present. Pt agreeable to this PT eval & tx. Upper Extremity ROM/Strength  Please see OT evaluation. Lower Extremity ROM / Strength   AROM WFL: Yes     Formal strength testing deferred 2/2 limited pt engagement. .  Grossly at least 3+/5 bilat based on demonstrated functional mobility. Lower Extremity Sensation    NT    Lower Extremity Proprioception:   NT    Coordination and Tone  NT    Balance  Sitting:  Not tested; N/A  Comments: Pt declined sitting EOB 2/2 discomfort with rectal tube. Standing: Not tested; N/A  Comments: N/A    Bed Mobility   Supine to Sit:    Not Tested  Sit to Supine:   Not Tested  Rolling:   Independent both directions  Scooting in sitting: Not Tested  Scooting in supine:  Not Tested   Bridging:  Yes, sufficient for sheet change under buttocks.     Transfer Training     Sit to stand:   Not Tested  Stand to sit:   Not Tested  Bed to Chair:   Not Tested with use of N/A    Gait pt declined to ambulate; pt ambulated 0 ft. Stair Training deferred, pt unsafe/ not appropriate to complete stairs at this time    Activity Tolerance   Pt completed therapy session with No adverse symptoms noted w/activity   Semifowlers at rest: XR=281/62, HR=low 90's, SpO2=94-95%, RR=25-30  After rolling: , SpO2 96%, RR 35    Positioning Needs   Pt in bed, ICU monitoring, positioned in proper neutral alignment and pressure relief provided. Call light provided and all needs within reach    Exercises Initiated  all completed bilaterally unless indicated and completed in supine position  Ankle Pumps x 5 reps   SAQ x 4 reps  Pt self limits. Other  None. Patient/Family Education   Pt educated on role of inpatient PT, POC, importance of continued activity, calling for assist with mobility. Assessment  Pt seen for Physical Therapy evaluation in acute care setting. Pt demonstrated decreased Activity tolerance as well as decreased independence with Ambulation, Bed Mobility  and Transfers. This evaluation limited 2/2 decreased patient participation, for fear of anxiety attack and discomfort with rectal tube. ROM appears WFL and strength appears 3+/5, at least sufficient for limited OOB mobility. Unable to assess balance and functional mobility. Pt requires/requests frequent rest breaks and repositioning while reclined in bed due to concerns for shortness of breath, so will need to assess her tolerance to out of bed activity. Will CTA for d/c rec. Will need patient to participate in OOB mobility. Goals : To be met in 3 visits:  1). Independent with LE Ex x 10 reps    To be met in 6 visits:  1). Supine to/from sit: Modified Independent  2). Sit to/from stand: Independent  3). Bed to chair: Independent  4).   Gait: Ambulate 50 ft.  with  Independent and use of LRAD (least restrictive assistive device)  5). Tolerate B LE exercises 3 sets of 10-15 reps  6). Ascend/descend 1 steps with Independent with use of hand rail unilateral and LRAD (least restrictive assistive device)    Rehabilitation Potential: Fair  Strengths for achieving goals include:   PLOF and Family Support   Barriers to achieving goals include:    Limited tolerance to activity    Plan    To be seen 3-5 x / week  while in acute care setting for therapeutic exercises, bed mobility, transfers, progressive gait training, balance training, and family/patient education. Signature: Anne Moralez, PT, DPT    If patient discharges from this facility prior to next visit, this note will serve as the Discharge Summary.

## 2022-04-06 NOTE — FLOWSHEET NOTE
04/05/22 2030   Vital Signs   Temp 98.9 °F (37.2 °C)   Temp Source Bladder   Pulse 117   Heart Rate Source Monitor   Resp 21   /87   BP Location Left upper arm   MAP (mmHg) 101   Level of Consciousness Alert (0)   MEWS Score 4   Pain Assessment   RASS Score 0   CPOT (Critical Patient)   CPOT (Critical Patient) Facial Expression 0   CPOT (Critical Patient) Body Movement 0   CPOT (Critical Patient) Muscle Tension 0   CPOT (Critical Patient) Compiance with Vent 0   Score CPOT (Vent) 0   CPOT (Critial Patient) Vent Status Intubated   Oxygen Therapy   SpO2 96 %   O2 Device Nasal cannula   O2 Flow Rate (L/min) 4 L/min   Patient assessment complete, calm, SOB with exertion but currently calm, respirations non-labored, oxygen in place 4 liters NC, lung sounds clear and diminished, cough thick yellow sputum expectorated, BS active, lasix given prior effective good urinary output today, urine still clear and light yellow tinged, stool liquid to flexiseal per rectum, encouraged patient to ask for yogurt for snacks with meals. Amanda care complete, patient states had bath today, bed pad changed, slight excoriation noted around flexi seal.  Drips:  Heparin drip infusing, 0.45 NS infusing at 50ml/hr. Snack given, swallowing thin liquids well tonight, refused turn, pillows placed under legs and arms. Call light in reach, will  Continue to monitor.

## 2022-04-06 NOTE — PROGRESS NOTES
04/05/22 4292   NIV Type   Mode Bilevel   Mask Type Full face mask   Mask Size Small   Settings/Measurements   IPAP 18 cmH20   CPAP/EPAP 8 cmH2O   Rate Ordered 12   Resp 24   FiO2  30 %   Vt Exhaled 548 mL   Comfort Level Good   SpO2 98

## 2022-04-06 NOTE — PROGRESS NOTES
Pulmonary & Critical Care Medicine ICU Progress Note    CC: Sepsis    Events of Last 24 hours: Tolerating Bipap well  Diuresed well    Vascular lines: IV: L IJ  3/31    Mechanical ventilation 4/2-4/4/22    Vent Mode: PS Rate Set: 0 bmp/Vt Ordered: 330 mL/ /FiO2 : 30 %  Recent Labs     04/04/22  1003 04/05/22  0510   PHART 7.502* 7.439   IIE1QUK 34.4* 38.9   PO2ART 134.3* 83.9       IV:   sodium chloride 50 mL/hr at 04/06/22 0450    heparin (PORCINE) Infusion 1,060 Units/hr (04/06/22 0444)    norepinephrine Stopped (04/03/22 0520)    sodium chloride      dextrose      sodium chloride         Vitals:  Blood pressure (!) 156/102, pulse 100, temperature 98.9 °F (37.2 °C), temperature source Bladder, resp. rate 25, height 5' 4\" (1.626 m), weight 130 lb 14.4 oz (59.4 kg), SpO2 96 %, not currently breastfeeding. on vent  General: ill appearing. Intubated sedated. Eyes: PERRL. No sclera icterus. No conjunctival injection. ENT: No discharge. Pharynx clear. Neck: Trachea midline. Normal thyroid. Resp: No accessory muscle use. Few crackles. No wheezing. No rhonchi. CV: Regular rate. Regular rhythm. No mumur or rub. 1+ LE edema. GI: Non-tender. Non-distended. No masses. Skin: Warm and dry. No nodule on exposed extremities. No rash on exposed extremities. Neuro: Moves all extremities. Follows commands.   Psych:calm, lethargic but easily awakened to follow commands      Scheduled Meds:   furosemide  40 mg IntraVENous BID    cefTRIAXone (ROCEPHIN) IV  2,000 mg IntraVENous Q24H    pantoprazole  40 mg IntraVENous Daily    QUEtiapine  25 mg Oral BID    metoprolol tartrate  25 mg Oral BID    levalbuterol  1.25 mg Nebulization 4x daily    insulin lispro  0-6 Units SubCUTAneous Q4H    fluticasone  1 spray Each Nostril Daily    vancomycin  125 mg Oral 4 times per day    atorvastatin  40 mg Oral Nightly    clopidogrel  75 mg Oral Daily    ferrous sulfate  325 mg Oral Daily with breakfast    sertraline 50 mg Oral Daily    sodium chloride flush  5-40 mL IntraVENous 2 times per day    predniSONE  5 mg Oral Daily with breakfast       Data:  CBC:   Recent Labs     04/04/22  0440 04/05/22  0500 04/06/22  0410   WBC 8.6 9.9 10.9   HGB 9.3* 9.6* 9.5*   HCT 28.3* 29.6* 29.1*   MCV 90.3 91.8 90.3    222 225     BMP:   Recent Labs     04/04/22  0440 04/04/22  0440 04/05/22  0500 04/05/22  1205 04/06/22  0410   *  --  147*  --  147*   K 2.9*   < > 3.3* 4.4 3.1*     --  110  --  102   CO2 29  --  27  --  35*   BUN 27*  --  24*  --  24*   CREATININE 1.2  --  1.1  --  1.1    < > = values in this interval not displayed. LIVER PROFILE:   No results for input(s): AST, ALT, LIPASE, BILIDIR, BILITOT, ALKPHOS in the last 72 hours. Invalid input(s): AMYLASE,  ALB    Microbiology:  3/30 UC E. Coli  3/30 BC E. Coli  3/30 COVID-19 not detected  3/31 C. difficile negative    Imaging:  CT abdomen/Pelvis 4/2  Mild body wall anasarca, small pleural effusions, and small amount of   abdominal and pelvic ascites, compatible with fluid overload   Scattered areas of colonic wall thickening are seen, either due to the partially contracted state of the colon or wall thickening from underlying Colitis. Posterior fundal fibroid versus thickening of the endometrial stripe. Recommend follow-up non urgent pelvic ultrasound for further characterization    CXR 4//22  Interval extubation with increasing interstitial airspace opacities   throughout.        ASSESSMENT:  · Acute hypoxemic respiratory failure  · Septic shock  · E. coli bacteremia  · E. coli UTI  · Acute kidney injury  · Hypernatremia  · A. fib with RVR  · History of history of C. difficile troponin  · COPD chronically on home O2 2.5 L and AVAPS- not in exacerbation   · Chronic anxiety with panic attacks  · CAD post CABG 2019        PLAN:  Bipap for life threatening respiratory failure  Supplemental oxygen to maintain SaO2 >92%; wean as tolerated   Home dose 5 mg prednisone   Replace K  Change lasix 40mg IV daily and add Diamox today  Continue 1/2 NS at 50cc/hr for hypernatremia  CTX D2/3 and had Zosyn 4 days  Continue Vanc  mg PO QID for 7 days following completion of antibiotics.    Aspirin, Plavix, beta-blocker  Blood sugar control, with goal 140-180  Heparin gtt for Afib  DVT prophylaxis:  Lovenox  Ok to transfer

## 2022-04-06 NOTE — PROGRESS NOTES
Patient asks to be checked, rectal tube leaking, bed pad changed, fauzia cream placed on patient, slight excoriation, offered bath, patient refused. Has been wearing bipap well, taken off for oral vancomycin. Doing much better with swallowing thin liquids. BiPAP replaced after bed pad change. Will continue to monitor.

## 2022-04-06 NOTE — PROGRESS NOTES
Inpatient Occupational Therapy  Evaluation and Treatment    Unit: ICU  Date:  4/6/2022  Patient Name:    Amy Sinclair  Admitting diagnosis:  Septicemia [facility]) [A41.9]  Elevated troponin [R77.8]  NADJA (acute kidney injury) ([facility] [address] [N17.9]  [facility] [A41.9]  Admit Date:  3/30/2022  Precautions/Restrictions/WB Status/ Lines/ Wounds/ Oxygen: Fall risk, Lines -IV, Supplemental O2 (4), Bill catheter and rectal tube , Telemetry and Continuous pulse oximetry    Treatment Time:  4469-9970   Treatment Number: 1   Timed code treatment minutes 50 minutes   Total Treatment minutes:   60   minutes    Patient Goals for Therapy:  \" go home  \"      Discharge Recommendations: to be determined -continue to assess  DME needs for discharge: continue to assess        Therapy recommendations for staff:   Assist of 1 for bed mobility     History of Present Illness: per H&P   [de-identified] y.o. female with a past medical h/o  hyperlipidemia asthma tobacco use COPD CAD GERD on 2 L at home at baseline brought in today by EMS with shortness of breath. Patient is a poor historian therefore it is difficult to obtain a full history. She does report increased shortness of breath and productive cough over the past 2 days. Denies chest pain or leg swelling. Denies fevers chills nausea vomiting diarrhea. Onset over the past 2 days. Duration of symptoms have been persistent since onset. Context includes shortness of breath. No aggravating symptoms. No alleviating symptoms. Otherwise denies any other complaints. The rest of the history at this time is difficult to obtain. Home Health S4 Level Recommendation:  Level 1 Standard  AM-PAC Score: 14  Preadmission Environment    Pt. Lives with family (son and son's girlfriend, a retired nurse). They are present at night; during the day other family members stay with patient majority of time (~3 hours/day without assist). Family live \"up the road\".    Home environment:            one story home  Steps to enter first floor: 1 steps to enter  Steps to second floor:         N/A  Bathroom: tub/shower unit and standard height commode with toilet riser; grab bar in shower. Equipment owned: SPC, SW, wc (manual, electric (not working)), lift chair, BSC, hospital bed , shower chair, home O2 (2.5L) continous and lifealert     Preadmission Status:  Pt. Able to drive: No  Pt Fully independent with ADLs: No   Pt. Required assistance from family for: Bathing, Cleaning, Cooking, Dressing and Laundry    Family feeds pt, \"I get dizzy\"  Pt. independent for transfers and gait and walked with No Device for limited distances (room to room, sometimes down a short walkway outside the house). History of falls          No    Pain  Yes  Rating:moderate  Location:buttocks due to  rectal tube   Pain Medicine Status: No request made      Cognition    A&O Person and Place , pt stated it was =May   Able to follow 2 step commands    Subjective  Patient lying supine in bed with no family present. Pt agreeable to this OT eval & tx. Upper Extremity ROM:    WFL    Upper Extremity Strength:    WFL  Upper Extremity Sensation    WFL    Upper Extremity Proprioception:  WFL    Coordination and Tone  WFL    Balance  Functional Sitting Balance:  WFL  Functional Standing Balance:NT    Bed mobility:    Supine to sit:   Not Tested- pt declined   Sit to supine:   Not Tested  Rolling:    CGA  Scooting in sitting:  Not Tested  Scooting to head of bed:   Total A    Bridging:   Not Tested    Transfers:    Sit to stand:  Not Tested  Stand to sit:  Not Tested  Bed to chair:   Not Tested  Standard toilet: Not Tested  Bed to [facility]:  Not Tested    Dressing:      UE:   Not Tested  LE:    Not Tested    Bathing:    UE:  Not Tested  LE:  Not Tested    Eating:   Supervision    Toileting:   Total A    Activity Tolerance   Pt completed therapy session with pain    Positioning Needs:   Pt in bed, no alarm needed, positioned in proper neutral alignment and pressure relief provided. Exercise / Activities Initiated:   Hand flex/ext:  x10  Wrist flex/ext:  x10  Forearm sup/pronation:  x10  Elbow flex/ext  Shoulder flex/ext:  x10    Patient/Family Education:   Role of OT    Assessment of Deficits: Pt seen for Occupational therapy evaluation in acute care setting. Pt demonstrated decreased Activity tolerance, ADLs, Balance , Bed mobility and Transfers. Pt functioning below baseline and will likely benefit from skilled occupational therapy services to maximize safety and independence. Goal(s) : To be met in 3 Visits:  1). Bed to toilet/BSC: Min A  with AD as needed     To be met in 5 Visits:  1). Supine to/from Sit:  CGA  2). Upper Body Bathing:   SBA  3). Lower Body Bathing:   Min A   4). Upper Body Dressing:  SBA  5). Lower Body Dressing:  Min A -mod  6). Pt to demonstrate UE exs x 15 reps with minimal cues    Rehabilitation Potential:  Fair for goals listed above. Strengths for achieving goals include: Pt cooperative  Barriers to achieving goals include:  Complexity of condition     Plan: To be seen 3-5 x/wk while in acute care setting for therapeutic exercises, bed mobility, transfers, dressing, bathing, family/patient education, ADL/IADL retraining, energy conservation training.      Margaret Luevano OTR/L [id number]          If patient discharges from this facility prior to next visit, this note will serve as the Discharge Summary

## 2022-04-06 NOTE — PROGRESS NOTES
Hospitalist Progress Note      PCP: Serina Burgos MD    Date of Admission: 3/30/2022    Chief Complaint: SOB from home via EMS. Hx of end stage COPD. Home o2 dependent - reports 2.5L baseline - increases to 3-4L when SOB     Recently treated for Cdiff diarrhea. Reports she finished Abx about a week ago - per home care notes Vanc was extended from 3/24-3/29 after the initial treatment and she finished it on 3/29. Pt reports foul smelling urine but denies dysuria. Subjective:     4/2  resp status worsened through the morning. Intubated urgently at bedside due to severe mixed resp and metabolic acidemia. 4/3  Intubated and sedated. Off levophed today. O2 sats 100% on FiO2 35% PEEP 5. Clinically appears much better and lung exam improved. 4/4-patient is extubated around noon today. Completely awake and alert. On 3 L of oxygen. Doing well. She is hungry. She wants to eat. Minimally confused. 4/5-patient is extubated 3 L yesterday. She became more hypoxic. She developed rapid A. fib. She was felt to be in fluid overload. She was given IV Lasix. Presently on BiPAP. Has started diuresing. 4/6-respiratory status improved. On nasal cannula oxygen. Good diuresis.   T-max 99.3      Medications:  Reviewed    Infusion Medications    sodium chloride 50 mL/hr at 04/06/22 0450    heparin (PORCINE) Infusion 1,060 Units/hr (04/06/22 0444)    sodium chloride      dextrose      sodium chloride       Scheduled Medications    [START ON 4/7/2022] furosemide  40 mg IntraVENous Daily    cefTRIAXone (ROCEPHIN) IV  2,000 mg IntraVENous Q24H    pantoprazole  40 mg IntraVENous Daily    QUEtiapine  25 mg Oral BID    metoprolol tartrate  25 mg Oral BID    levalbuterol  1.25 mg Nebulization 4x daily    insulin lispro  0-6 Units SubCUTAneous Q4H    fluticasone  1 spray Each Nostril Daily    vancomycin  125 mg Oral 4 times per day    atorvastatin  40 mg Oral Nightly    clopidogrel  75 mg Oral Daily    ferrous sulfate  325 mg Oral Daily with breakfast    sertraline  50 mg Oral Daily    sodium chloride flush  5-40 mL IntraVENous 2 times per day    predniSONE  5 mg Oral Daily with breakfast     PRN Meds: potassium chloride, magnesium sulfate, sodium chloride, heparin (porcine), heparin (porcine), levalbuterol, sodium chloride, glucose, glucagon (rDNA), dextrose, dextrose bolus (hypoglycemia) **OR** dextrose bolus (hypoglycemia), sodium chloride flush, sodium chloride, ondansetron **OR** ondansetron, polyethylene glycol, acetaminophen **OR** acetaminophen      Intake/Output Summary (Last 24 hours) at 4/6/2022 1335  Last data filed at 4/6/2022 1200  Gross per 24 hour   Intake 1576.76 ml   Output 5800 ml   Net -4223.24 ml       Physical Exam Performed:    /68   Pulse 97   Temp 99.3 °F (37.4 °C) (Bladder)   Resp 28   Ht 5' 4\" (1.626 m)   Wt 130 lb 14.4 oz (59.4 kg)   SpO2 99%   BMI 22.47 kg/m²     General appearance: Awake and alert. On nasal cannula oxygen. Ill-appearing but improved. Mentation improved. HEENT: Pupils equal, round, and reactive to light. Conjunctivae/corneas clear. Neck: Supple, with full range of motion. No jugular venous distention. Trachea midline. Respiratory: Diminished breath sounds the bases. Few crackles. No wheezes or rhonchi. Cardiovascular: Regular rate and rhythm with normal S1/S2 without murmurs, rubs or gallops. Abdomen: Soft, non-tender, non-distended with normal bowel sounds. Musculoskeletal: No clubbing, cyanosis or edema bilaterally. Full range of motion without deformity. Skin: Skin color, texture, turgor normal.  No rashes or lesions. Neurologic:  Intubated and sedated.    Capillary Refill: Brisk,3 seconds, normal   Peripheral Pulses: +2 palpable, equal bilaterally       Labs:   Recent Labs     04/04/22  0440 04/05/22  0500 04/06/22  0410   WBC 8.6 9.9 10.9   HGB 9.3* 9.6* 9.5*   HCT 28.3* 29.6* 29.1*    222 225 Recent Labs     04/04/22  0440 04/04/22  0440 04/05/22  0500 04/05/22  1205 04/06/22  0410   *  --  147*  --  147*   K 2.9*   < > 3.3* 4.4 3.1*     --  110  --  102   CO2 29  --  27  --  35*   BUN 27*  --  24*  --  24*   CREATININE 1.2  --  1.1  --  1.1   CALCIUM 8.5  --  8.7  --  8.9    < > = values in this interval not displayed. No results for input(s): AST, ALT, BILIDIR, BILITOT, ALKPHOS in the last 72 hours. No results for input(s): INR in the last 72 hours. Recent Labs     04/05/22  0500 04/05/22  0850 04/05/22  1205   TROPONINI 0.05* 0.04* 0.04*       Urinalysis:      Lab Results   Component Value Date    NITRU POSITIVE 03/30/2022    WBCUA  03/30/2022    BACTERIA 3+ 03/30/2022    RBCUA 3-4 03/30/2022    BLOODU MODERATE 03/30/2022    SPECGRAV 1.015 03/30/2022    GLUCOSEU Negative 03/30/2022       Radiology:  XR CHEST PORTABLE   Final Result   Interval extubation with increasing interstitial airspace opacities   throughout. XR CHEST PORTABLE   Final Result   Line and tubes as above. Stable appearance of bilateral interstitial opacities. XR CHEST PORTABLE   Final Result   1. Suboptimal examination due to multiple extraneous wires projecting over   the patient's chest. It appears devices are in similar position from prior. 2. Bilateral bronchial wall thickening with slightly increased   reticulonodular opacities which may reflect evolving infectious/inflammatory   airways process. XR CHEST PORTABLE   Final Result   Satisfactory line and tube placement.          CT ABDOMEN PELVIS WO CONTRAST Additional Contrast? None   Final Result   Mild body wall anasarca, small pleural effusions, and small amount of   abdominal and pelvic ascites, compatible with fluid overload      Scattered areas of colonic wall thickening are seen, either due to the   partially contracted state of the colon or wall thickening from underlying   colitis      Posterior fundal fibroid versus thickening of the endometrial stripe. Recommend follow-up non urgent pelvic ultrasound for further characterization. XR CHEST PORTABLE   Final Result   Interval placement of a left IJ approach central venous catheter with the tip   overlying the mid SVC, with no pneumothorax identified. XR CHEST PORTABLE   Final Result   Bilateral ill-defined linear pulmonary opacities could represent pulmonary   edema or infection                 Assessment/Plan:    Principal Problem:    Sepsis ([address]  Active Problems:    Elevated troponin    S/P CABG x 2    Tachycardia    Acute hypoxemic respiratory failure (HCC)    Septic shock (HCC)    Bacteremia    Urinary tract infection without hematuria    Atrial fibrillation with RVR (HCC)    Electrolyte disorder  Resolved Problems:    * No resolved hospital problems. *       Sepsis with severe sepsis and septic shock. Diarrhea - recent Cdiff infection, though Cdiff negative this admission. Bacteremia - Ecoli ([facility]) on blood cultures  And also on urine culture from 3/30  IV rocephin - to IV zosyn on 4/2. Completed 3 days of Rocephin. Completed 4 days of Zosyn. Finish total 7 days of antibiotics. Empiric PO vanc - will need to be on this while on other Abx and for at least 10 days after due to recent Cdiff. She is off vasopressors. 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 Acute on chronic hypoxic resp failure. Resolved. Decompensated 4/2 and intubated urgently. Extubated on 4/4/2022. Solumedrol IV. Xopenex due to severe sinus tachycardia. -Developed worsening respiratory failure and was placed on BiPAP  -Fluid overloaded. Received Lasix which helped. Change Lasix to 40 IV daily. Give a dose of Diamox for metabolic alkalosis. Started on half-normal saline for hypernatremia.  -Respiratory status improved. Possibly Afib   Sinus tachy this am.   Off dilt gtt. On heparin gtt.        Acute Acidemia 4/2 - much better now   pH 7.08, mixed respiratory and metabolic acidosis resulting in severe acidemia. 1 amp of bicarb   Intubated 4/2. Extubated on 4/4/2022. Presently on BiPAP. Hypokalemia. Replace electrolytes. Mood disorder on zoloft. CAD  On plavix. Beta-blocker initially held due to low blood pressure. Has been resumed. Statin on hold. Hx of GI bleed. Cont PPI. Monitor Hgb. Mod to severe protein calorie malnutrition. Supplement as tolerates. -Speech therapy evaluation. Start diet after that. DVT Prophylaxis: heparin  Diet: ADULT DIET; Dysphagia - Pureed  Code Status: Full Code        Prior code status discussions - pt and family insist on full code.                Angelina Mir MD 4/6/2022 1:35 PM

## 2022-04-06 NOTE — PROGRESS NOTES
Speech Language Pathology  Dysphagia Treatment/Follow-Up Note  Facility/Department: [facility] ICU      NAME:Terri Smith  : 1940 ([de-identified] y.o.)   MRN: [id number]  ROOM: [address] [place] [address]  ADMISSION DATE: 3/30/2022  PATIENT DIAGNOSIS(ES): Septicemia ([facility] [A41.9]  Elevated troponin [R77.8]  NADJA (acute kidney injury) ([facility] [N17.9]  Sepsis ([facility] [A41.9]  Allergies   Allergen Reactions    Latex Other (See Comments)     Burning      Codeine Other (See Comments)     \"hallucinations\"       DATE ONSET: 3/30/2022    Pain: The patient does not complain of pain      Current Diet: ADULT DIET; Dysphagia - Pureed    Diet Tolerance:  Patient tolerating current diet level without signs/symptoms of aspiration. Dysphagia Treatment and Impression   Subjective: Pt seen in room at bedside with RN permission  · RN Report/Chart Review: RN reports pt has low appetite but accepting thin liquids and orange sherbet without difficulty noted. · Pt's family present and reports pt did in fact eat regular solids and thin liquids and never drank mildly thick liquids after MBS in 2021 because she did not care for them. Family also reports they used chin tuck when pt drank in bed but not when up in chair, though no note found to indicate chin tuck attempted on MBS to suggest benefits or detriment to swallow function. · Pt is independent in feeding after set-up. · Pt prefers straws. · PT reports pt had anterior liquid escape and pooling when given sips of thin liquid during session. Uncertain of mode of delivery of thins with PT. No spillage or pooling noted durin ST session.      Respiratory Status: Pt with SPO2% of  97 on 4 LPM NC with RR of 24-26     Liquid PO Trials:   · IDDSI 0 Thin:  Assessed via straw: no anterior bolus loss , suspect functional A-P bolus transit, swallow timing subjectively appears timely, oral clearance grossly WFL and no clinical s/s of aspiration   · IDDSI 0 Thin: Pt feeds self orange sherbet by tsp. Oral swallow appears WNL. No overt clinical signs of dysphagia noted. Solid PO Trials  · Pt pleasantly declines puree and solid trials this day. Goal is to return to eating solids. Pt continues to prefer puree to advancing to solids at this time.  Education: SLP edu pt re: Role of SLP, Rationale for dysphagia tx, Recommended compensatory strategies, Aspiration precautions, POC, techniques to improve respiratory-swallow coordination and Importance of oral care to reduce adverse affects in the event of aspiration. Pt verbalized understanding, would benefit from ongoing education and RN aware of recommendations   Assessment: Pt tolerating recommended diet with no clinical s/s of aspiration. Good carryover of recommended compensatory strategies with pt independent in use of small bites/sips and slow rate of intake. Limited po intake at this time. Pt goal is to return to solids.  Recommendations: SLP recommends to continue with IDDSI 4 Puree Solids; IDDSI 0 Thin Liquids - straws OK; Meds whole with thin liquids or puree   Risk Management: upright for all intake, stay upright for at least 30 mins after intake, small bites/sips, distant supervision with intake, oral care 2-3x/day to reduce adverse affects in the event of aspiration, increase physical mobility as able, slow rate of intake, general aspiration precautions and hold PO and contact SLP if s/s of aspiration or worsening respiratory status develop. Pat Irving Dysphagia Goals:    Short Term Goals:  Timeframe for Short Term Goals: (5 days 04/09/2022)  Goal 1: The patient will tolerate recommended diet with no clinical s/s of aspiration 5/5 04/06 Addressed. Ongoing. See above. Goal 2: The patient will tolerate therapeutic diet upgrade trials with no clinical s/s of aspiration 5/5 04/06 Addressed. Ongoing. See above. Goal 3: The patient will recall/perform recommended compensatory strategies given min cues 04/06 Addressed. Ongoing.  See above.        Long Term Goals:   Timeframe for Long Term Goals: (7 days 04/11/2022)  Goal 1: The patient will tolerate least restrictive diet with no clinical s/s of aspiration or worsening respiratory/pulmonary status 04/06 Addressed. Ongoing. See above. Speech/Language/Cog Goals: N/A                        Recommendations:  Solid Consistency: IDDSI 4 Puree Solids  Liquid Consistency: IDDSI 0 Thin Liquids - straws OK  Medication: Meds whole with thin liquids or puree    Plan:    Continued Dysphagia treatment with goals per plan of care. Discharge Recommendations: TBD    If pt discharges from hospital prior to Speech/Swallowing discharge, this note serves as tx and discharge summary. Total Treatment Time / Charges     Time in Time out Total Time / units   Cognitive Tx         Speech Tx      Dysphagia Tx 1455 1512 17 min/1 unit     Signature:  Chantal Enciso. Gearl Buerger, M.A.  [id number] [facility], [place], operated by [facility]  Speech-Language Pathologist

## 2022-04-06 NOTE — PROGRESS NOTES
04/06/22 0306   NIV Type   Mode Bilevel   Mask Type Full face mask   Mask Size Small   Settings/Measurements   IPAP 18 cmH20   CPAP/EPAP 8 cmH2O   Rate Ordered 12   Resp 18   FiO2  30 %   Vt Exhaled 634 mL   Comfort Level Good   SpO2 98

## 2022-04-06 NOTE — PROGRESS NOTES
CM-SR-ST w/freq PVC's, VSS, pt remains afebrile, UO WNL  Pt is stable on 3 liter per NC. She is taking PO w/out difficulty. Pt is alert & oriented & is w/out evidence of distress @ this time.

## 2022-04-07 LAB
ANION GAP SERPL CALCULATED.3IONS-SCNC: 7 MMOL/L (ref 3–16)
ANTI-XA UNFRAC HEPARIN: 0.36 IU/ML (ref 0.3–0.7)
BANDED NEUTROPHILS RELATIVE PERCENT: 6 % (ref 0–7)
BASOPHILS ABSOLUTE: 0 K/UL (ref 0–0.2)
BASOPHILS RELATIVE PERCENT: 0 %
BUN BLDV-MCNC: 18 MG/DL (ref 7–20)
CALCIUM SERPL-MCNC: 8.4 MG/DL (ref 8.3–10.6)
CHLORIDE BLD-SCNC: 101 MMOL/L (ref 99–110)
CO2: 35 MMOL/L (ref 21–32)
CREAT SERPL-MCNC: 1 MG/DL (ref 0.6–1.2)
EOSINOPHILS ABSOLUTE: 0.4 K/UL (ref 0–0.6)
EOSINOPHILS RELATIVE PERCENT: 3 %
GFR AFRICAN AMERICAN: >60
GFR NON-AFRICAN AMERICAN: 53
GLUCOSE BLD-MCNC: 103 MG/DL (ref 70–99)
GLUCOSE BLD-MCNC: 121 MG/DL (ref 70–99)
GLUCOSE BLD-MCNC: 123 MG/DL (ref 70–99)
GLUCOSE BLD-MCNC: 124 MG/DL (ref 70–99)
HCT VFR BLD CALC: 28.5 % (ref 36–48)
HEMOGLOBIN: 9.3 G/DL (ref 12–16)
LYMPHOCYTES ABSOLUTE: 0.9 K/UL (ref 1–5.1)
LYMPHOCYTES RELATIVE PERCENT: 7 %
MAGNESIUM: 1.6 MG/DL (ref 1.8–2.4)
MCH RBC QN AUTO: 29.1 PG (ref 26–34)
MCHC RBC AUTO-ENTMCNC: 32.5 G/DL (ref 31–36)
MCV RBC AUTO: 89.7 FL (ref 80–100)
MONOCYTES ABSOLUTE: 0.5 K/UL (ref 0–1.3)
MONOCYTES RELATIVE PERCENT: 4 %
NEUTROPHILS ABSOLUTE: 10.9 K/UL (ref 1.7–7.7)
NEUTROPHILS RELATIVE PERCENT: 80 %
PDW BLD-RTO: 13.8 % (ref 12.4–15.4)
PERFORMED ON: ABNORMAL
PLATELET # BLD: 230 K/UL (ref 135–450)
PLATELET SLIDE REVIEW: ADEQUATE
PMV BLD AUTO: 8.8 FL (ref 5–10.5)
POTASSIUM SERPL-SCNC: 3 MMOL/L (ref 3.5–5.1)
RBC # BLD: 3.18 M/UL (ref 4–5.2)
SLIDE REVIEW: ABNORMAL
SODIUM BLD-SCNC: 143 MMOL/L (ref 136–145)
WBC # BLD: 12.7 K/UL (ref 4–11)

## 2022-04-07 PROCEDURE — 6360000002 HC RX W HCPCS: Performed by: INTERNAL MEDICINE

## 2022-04-07 PROCEDURE — 6370000000 HC RX 637 (ALT 250 FOR IP): Performed by: INTERNAL MEDICINE

## 2022-04-07 PROCEDURE — 2700000000 HC OXYGEN THERAPY PER DAY

## 2022-04-07 PROCEDURE — 83735 ASSAY OF MAGNESIUM: CPT

## 2022-04-07 PROCEDURE — 99233 SBSQ HOSP IP/OBS HIGH 50: CPT | Performed by: INTERNAL MEDICINE

## 2022-04-07 PROCEDURE — 94640 AIRWAY INHALATION TREATMENT: CPT

## 2022-04-07 PROCEDURE — 85025 COMPLETE CBC W/AUTO DIFF WBC: CPT

## 2022-04-07 PROCEDURE — 80048 BASIC METABOLIC PNL TOTAL CA: CPT

## 2022-04-07 PROCEDURE — C9113 INJ PANTOPRAZOLE SODIUM, VIA: HCPCS | Performed by: INTERNAL MEDICINE

## 2022-04-07 PROCEDURE — 2580000003 HC RX 258: Performed by: INTERNAL MEDICINE

## 2022-04-07 PROCEDURE — 85520 HEPARIN ASSAY: CPT

## 2022-04-07 PROCEDURE — 94761 N-INVAS EAR/PLS OXIMETRY MLT: CPT

## 2022-04-07 PROCEDURE — 94660 CPAP INITIATION&MGMT: CPT

## 2022-04-07 PROCEDURE — 2060000000 HC ICU INTERMEDIATE R&B

## 2022-04-07 PROCEDURE — 36415 COLL VENOUS BLD VENIPUNCTURE: CPT

## 2022-04-07 RX ORDER — PANTOPRAZOLE SODIUM 40 MG/1
40 TABLET, DELAYED RELEASE ORAL
Status: DISCONTINUED | OUTPATIENT
Start: 2022-04-08 | End: 2022-04-09 | Stop reason: HOSPADM

## 2022-04-07 RX ADMIN — APIXABAN 2.5 MG: 5 TABLET, FILM COATED ORAL at 21:54

## 2022-04-07 RX ADMIN — METOPROLOL TARTRATE 25 MG: 25 TABLET, FILM COATED ORAL at 07:56

## 2022-04-07 RX ADMIN — CEFTRIAXONE 2000 MG: 2 INJECTION, POWDER, FOR SOLUTION INTRAMUSCULAR; INTRAVENOUS at 07:59

## 2022-04-07 RX ADMIN — PREDNISONE 5 MG: 5 TABLET ORAL at 07:55

## 2022-04-07 RX ADMIN — CLOPIDOGREL BISULFATE 75 MG: 75 TABLET ORAL at 07:55

## 2022-04-07 RX ADMIN — BUSPIRONE HYDROCHLORIDE 5 MG: 5 TABLET ORAL at 12:21

## 2022-04-07 RX ADMIN — POTASSIUM CHLORIDE 20 MEQ: 400 INJECTION, SOLUTION INTRAVENOUS at 06:59

## 2022-04-07 RX ADMIN — LEVALBUTEROL 1.25 MG: 1.25 SOLUTION, CONCENTRATE RESPIRATORY (INHALATION) at 07:36

## 2022-04-07 RX ADMIN — QUETIAPINE FUMARATE 25 MG: 25 TABLET ORAL at 21:54

## 2022-04-07 RX ADMIN — FUROSEMIDE 40 MG: 10 INJECTION, SOLUTION INTRAMUSCULAR; INTRAVENOUS at 07:56

## 2022-04-07 RX ADMIN — POTASSIUM CHLORIDE 20 MEQ: 400 INJECTION, SOLUTION INTRAVENOUS at 06:21

## 2022-04-07 RX ADMIN — LEVALBUTEROL 1.25 MG: 1.25 SOLUTION, CONCENTRATE RESPIRATORY (INHALATION) at 21:12

## 2022-04-07 RX ADMIN — PANTOPRAZOLE SODIUM 40 MG: 40 INJECTION, POWDER, FOR SOLUTION INTRAVENOUS at 07:55

## 2022-04-07 RX ADMIN — FLUTICASONE PROPIONATE 1 SPRAY: 50 SPRAY, METERED NASAL at 07:55

## 2022-04-07 RX ADMIN — ACETAMINOPHEN 650 MG: 325 TABLET ORAL at 21:55

## 2022-04-07 RX ADMIN — SERTRALINE HYDROCHLORIDE 50 MG: 50 TABLET ORAL at 07:55

## 2022-04-07 RX ADMIN — Medication 125 MG: at 02:18

## 2022-04-07 RX ADMIN — POTASSIUM CHLORIDE 20 MEQ: 400 INJECTION, SOLUTION INTRAVENOUS at 07:52

## 2022-04-07 RX ADMIN — Medication 125 MG: at 18:42

## 2022-04-07 RX ADMIN — LEVALBUTEROL 1.25 MG: 1.25 SOLUTION, CONCENTRATE RESPIRATORY (INHALATION) at 15:49

## 2022-04-07 RX ADMIN — ATORVASTATIN CALCIUM 40 MG: 40 TABLET, FILM COATED ORAL at 21:54

## 2022-04-07 RX ADMIN — MAGNESIUM SULFATE HEPTAHYDRATE 1000 MG: 1 INJECTION, SOLUTION INTRAVENOUS at 15:16

## 2022-04-07 RX ADMIN — HEPARIN SODIUM 1060 UNITS/HR: 10000 INJECTION, SOLUTION INTRAVENOUS; SUBCUTANEOUS at 05:37

## 2022-04-07 RX ADMIN — ACETAMINOPHEN 650 MG: 325 TABLET ORAL at 08:06

## 2022-04-07 RX ADMIN — Medication 125 MG: at 06:14

## 2022-04-07 RX ADMIN — APIXABAN 2.5 MG: 5 TABLET, FILM COATED ORAL at 12:21

## 2022-04-07 RX ADMIN — QUETIAPINE FUMARATE 25 MG: 25 TABLET ORAL at 07:55

## 2022-04-07 RX ADMIN — ACETAMINOPHEN 650 MG: 325 TABLET ORAL at 17:16

## 2022-04-07 RX ADMIN — MAGNESIUM SULFATE HEPTAHYDRATE 1000 MG: 1 INJECTION, SOLUTION INTRAVENOUS at 14:38

## 2022-04-07 RX ADMIN — BUSPIRONE HYDROCHLORIDE 5 MG: 5 TABLET ORAL at 07:55

## 2022-04-07 RX ADMIN — FERROUS SULFATE TAB 325 MG (65 MG ELEMENTAL FE) 325 MG: 325 (65 FE) TAB at 07:55

## 2022-04-07 RX ADMIN — BUSPIRONE HYDROCHLORIDE 5 MG: 5 TABLET ORAL at 21:54

## 2022-04-07 RX ADMIN — LEVALBUTEROL 1.25 MG: 1.25 SOLUTION, CONCENTRATE RESPIRATORY (INHALATION) at 11:32

## 2022-04-07 RX ADMIN — Medication 125 MG: at 13:04

## 2022-04-07 ASSESSMENT — PAIN DESCRIPTION - ORIENTATION: ORIENTATION: MID

## 2022-04-07 ASSESSMENT — PAIN DESCRIPTION - PAIN TYPE: TYPE: ACUTE PAIN

## 2022-04-07 ASSESSMENT — PAIN DESCRIPTION - PROGRESSION: CLINICAL_PROGRESSION: GRADUALLY WORSENING

## 2022-04-07 ASSESSMENT — PAIN SCALES - GENERAL
PAINLEVEL_OUTOF10: 0
PAINLEVEL_OUTOF10: 10
PAINLEVEL_OUTOF10: 3
PAINLEVEL_OUTOF10: 8
PAINLEVEL_OUTOF10: 0

## 2022-04-07 ASSESSMENT — PAIN DESCRIPTION - FREQUENCY: FREQUENCY: CONTINUOUS

## 2022-04-07 ASSESSMENT — PAIN DESCRIPTION - DESCRIPTORS: DESCRIPTORS: ACHING

## 2022-04-07 ASSESSMENT — PAIN DESCRIPTION - LOCATION: LOCATION: HEAD

## 2022-04-07 ASSESSMENT — PAIN DESCRIPTION - ONSET: ONSET: ON-GOING

## 2022-04-07 NOTE — PROGRESS NOTES
Shift assessment is complete. Pt is alert/oriented. Nasal canula 2 liters with easy/even respirations. (same level of oxygen as at home) Lines are dry/intact with heparin and potassium replacements along with intermittent IV antibiotics. Pt has a poor appetite and 25% of breakfast. Bill catheter is intact and draining yellow/clear. Patient has a rectal tube, she continues to drain watery loose stools. VSS, telemetry afib. Pt has a headache this morning, plans for tylenol with morning meds.

## 2022-04-07 NOTE — PROGRESS NOTES
04/07/22 0334   NIV Type   Mode Bilevel   Mask Type Full face mask   Mask Size Small   Settings/Measurements   IPAP 18 cmH20   CPAP/EPAP 8 cmH2O   Rate Ordered 12   Resp 20   FiO2  30 %   Vt Exhaled 493 mL   Comfort Level Good   SpO2 98

## 2022-04-07 NOTE — PROGRESS NOTES
Report given to [facility], transfer of care.  Bedside skin check by both nurses

## 2022-04-07 NOTE — PROGRESS NOTES
04/06/22 2321   NIV Type   Mode Bilevel   Mask Type Full face mask   Mask Size Small   Settings/Measurements   IPAP 18 cmH20   CPAP/EPAP 8 cmH2O   Rate Ordered 12   Resp 18   FiO2  30 %   Vt Exhaled 535 mL   Comfort Level Good   SpO2 97

## 2022-04-07 NOTE — PROGRESS NOTES
Bedside report given to [facility]. Pt. denies further needs at this time. Call light is within reach.   Johanny Spears RN

## 2022-04-07 NOTE — PROGRESS NOTES
Anti-Xa = 0.36 at 0530. Continue heparin gtt at 1060 units/hr. Continue to check Anti-Xa daily.   Sandra Kelly PharmD  2022 6:47 AM

## 2022-04-07 NOTE — PROGRESS NOTES
Speech Language Pathology    Attempted to see for clinical swallow re-evaluation, to assess readiness to advance diet textures. Pt starting respiratory tx. Per yesterday's assessment, pt was preferring to continue pureed textures and thin liquids. ST will continue to follow as pt is available. Rianna Cruz MS CCC-SLP  Speech Language Pathologist   Phone [phone]

## 2022-04-07 NOTE — FLOWSHEET NOTE
04/07/22 1702   Vital Signs   Temp 97.2 °F (36.2 °C)   Temp Source Oral   Pulse 96   Heart Rate Source Monitor   Resp 22   /75   BP Location Left upper arm   Level of Consciousness Alert (0)   MEWS Score 2   Oxygen Therapy   SpO2 97 %   O2 Device Nasal cannula   O2 Flow Rate (L/min) 2 L/min     Pt. Arrived from ICU in stable condition. Pt. Oriented to room and call light system. Pt. denies further needs at this time. Will continue to monitor. Call light is within reach.   Gaviota Price RN

## 2022-04-07 NOTE — PROGRESS NOTES
Hospitalist Progress Note      PCP: Rosita Love MD    Date of Admission: 3/30/2022    Chief Complaint: SOB from home via EMS. Hx of end stage COPD. Home o2 dependent - reports 2.5L baseline - increases to 3-4L when SOB     Recently treated for Cdiff diarrhea. Reports she finished Abx about a week ago - per home care notes Vanc was extended from 3/24-3/29 after the initial treatment and she finished it on 3/29. Pt reports foul smelling urine but denies dysuria. Subjective:     4/2  resp status worsened through the morning. Intubated urgently at bedside due to severe mixed resp and metabolic acidemia. 4/3  Intubated and sedated. Off levophed today. O2 sats 100% on FiO2 35% PEEP 5. Clinically appears much better and lung exam improved. 4/4-patient is extubated around noon today. Completely awake and alert. On 3 L of oxygen. Doing well. She is hungry. She wants to eat. Minimally confused. 4/5-patient is extubated 3 L yesterday. She became more hypoxic. She developed rapid A. fib. She was felt to be in fluid overload. She was given IV Lasix. Presently on BiPAP. Has started diuresing. 4/6-respiratory status improved. On nasal cannula oxygen. Good diuresis. T-max 99.3    4/7-used BiPAP last night. Feeling better.     Medications:  Reviewed    Infusion Medications    sodium chloride 50 mL/hr at 04/06/22 1400    sodium chloride      dextrose      sodium chloride       Scheduled Medications    apixaban  2.5 mg Oral BID    furosemide  40 mg IntraVENous Daily    busPIRone  5 mg Oral TID    pantoprazole  40 mg IntraVENous Daily    QUEtiapine  25 mg Oral BID    metoprolol tartrate  25 mg Oral BID    levalbuterol  1.25 mg Nebulization 4x daily    insulin lispro  0-6 Units SubCUTAneous Q4H    fluticasone  1 spray Each Nostril Daily    vancomycin  125 mg Oral 4 times per day    atorvastatin  40 mg Oral Nightly    clopidogrel  75 mg Oral Daily    ferrous sulfate  325 mg Oral Daily with breakfast    sertraline  50 mg Oral Daily    sodium chloride flush  5-40 mL IntraVENous 2 times per day    predniSONE  5 mg Oral Daily with breakfast     PRN Meds: potassium chloride, magnesium sulfate, sodium chloride, levalbuterol, sodium chloride, glucose, glucagon (rDNA), dextrose, dextrose bolus (hypoglycemia) **OR** dextrose bolus (hypoglycemia), sodium chloride flush, sodium chloride, ondansetron **OR** ondansetron, polyethylene glycol, acetaminophen **OR** acetaminophen      Intake/Output Summary (Last 24 hours) at 4/7/2022 1053  Last data filed at 4/7/2022 0645  Gross per 24 hour   Intake 659.45 ml   Output 2030 ml   Net -1370.55 ml       Physical Exam Performed:    BP (!) 101/56   Pulse 103   Temp 98.6 °F (37 °C) (Oral)   Resp 26   Ht 5' 4\" (1.626 m)   Wt 130 lb 14.4 oz (59.4 kg)   SpO2 95%   BMI 22.47 kg/m²     General appearance: Awake and alert. On nasal cannula oxygen. Doing better. Mentation normal.    HEENT: Pupils equal, round, and reactive to light. Conjunctivae/corneas clear. Neck: Supple, with full range of motion. No jugular venous distention. Trachea midline. Respiratory: Diminished breath sounds the bases. Few crackles. No wheezes or rhonchi. Cardiovascular: Regular rate and rhythm with normal S1/S2 without murmurs, rubs or gallops. Abdomen: Soft, non-tender, non-distended with normal bowel sounds. Musculoskeletal: No clubbing, cyanosis or edema bilaterally. Full range of motion without deformity. Skin: Skin color, texture, turgor normal.  No rashes or lesions. Neurologic:  Intubated and sedated.    Capillary Refill: Brisk,3 seconds, normal   Peripheral Pulses: +2 palpable, equal bilaterally       Labs:   Recent Labs     04/05/22  0500 04/06/22  0410 04/07/22  0530   WBC 9.9 10.9 12.7*   HGB 9.6* 9.5* 9.3*   HCT 29.6* 29.1* 28.5*    225 230     Recent Labs     04/05/22  0500 04/05/22  0500 04/05/22  1205 04/06/22  0415 04/07/22  0530   *  --   --  147* 143   K 3.3*   < > 4.4 3.1* 3.0*     --   --  102 101   CO2 27  --   --  35* 35*   BUN 24*  --   --  24* 18   CREATININE 1.1  --   --  1.1 1.0   CALCIUM 8.7  --   --  8.9 8.4    < > = values in this interval not displayed. No results for input(s): AST, ALT, BILIDIR, BILITOT, ALKPHOS in the last 72 hours. No results for input(s): INR in the last 72 hours. Recent Labs     04/05/22  0500 04/05/22  0850 04/05/22  1205   TROPONINI 0.05* 0.04* 0.04*       Urinalysis:      Lab Results   Component Value Date    NITRU POSITIVE 03/30/2022    WBCUA  03/30/2022    BACTERIA 3+ 03/30/2022    RBCUA 3-4 03/30/2022    BLOODU MODERATE 03/30/2022    SPECGRAV 1.015 03/30/2022    GLUCOSEU Negative 03/30/2022       Radiology:  XR CHEST PORTABLE   Final Result   Interval extubation with increasing interstitial airspace opacities   throughout. XR CHEST PORTABLE   Final Result   Line and tubes as above. Stable appearance of bilateral interstitial opacities. XR CHEST PORTABLE   Final Result   1. Suboptimal examination due to multiple extraneous wires projecting over   the patient's chest. It appears devices are in similar position from prior. 2. Bilateral bronchial wall thickening with slightly increased   reticulonodular opacities which may reflect evolving infectious/inflammatory   airways process. XR CHEST PORTABLE   Final Result   Satisfactory line and tube placement. CT ABDOMEN PELVIS WO CONTRAST Additional Contrast? None   Final Result   Mild body wall anasarca, small pleural effusions, and small amount of   abdominal and pelvic ascites, compatible with fluid overload      Scattered areas of colonic wall thickening are seen, either due to the   partially contracted state of the colon or wall thickening from underlying   colitis      Posterior fundal fibroid versus thickening of the endometrial stripe.    Recommend follow-up non urgent pelvic ultrasound for further characterization. XR CHEST PORTABLE   Final Result   Interval placement of a left IJ approach central venous catheter with the tip   overlying the mid SVC, with no pneumothorax identified. XR CHEST PORTABLE   Final Result   Bilateral ill-defined linear pulmonary opacities could represent pulmonary   edema or infection                 Assessment/Plan:    Principal Problem:    Sepsis ([address]  Active Problems:    Elevated troponin    S/P CABG x 2    Tachycardia    Acute hypoxemic respiratory failure (HCC)    Septic shock (HCC)    Bacteremia    Urinary tract infection without hematuria    Atrial fibrillation with RVR (HCC)    Electrolyte disorder  Resolved Problems:    * No resolved hospital problems. *       Sepsis with severe sepsis and septic shock. Diarrhea - recent Cdiff infection, though Cdiff negative this admission. Bacteremia - Ecoli ([facility]) on blood cultures  And also on urine culture from 3/30  IV rocephin - to IV zosyn on 4/2. Continue Rocephin 2 g daily. Completed 4 days of Zosyn. Empiric PO vanc - will need to be on this while on other Abx and for at least 10 days after due to recent Cdiff. She is off vasopressors. 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 Acute on chronic hypoxic resp failure. Resolved. Decompensated 4/2 and intubated urgently. Extubated on 4/4/2022. Solumedrol IV. Xopenex due to severe sinus tachycardia. -Developed worsening respiratory failure and was placed on BiPAP  -Fluid overloaded. Received Lasix which helped. Change Lasix to 40 IV daily. Give a dose of Diamox for metabolic alkalosis. Started on half-normal saline for hypernatremia. Hyponatremia resolved. -Respiratory status improved. Using BiPAP at night. Possibly Afib   Sinus tachy this am.   Off dilt gtt. On heparin gtt. transition to Eliquis.       Acute Acidemia 4/2 - much better now   pH 7.08, mixed respiratory and metabolic acidosis resulting in severe acidemia. 1 amp of bicarb   Intubated 4/2. Extubated on 4/4/2022. Presently on BiPAP. Hypokalemia. Replace electrolytes. Mood disorder on zoloft. CAD  On plavix. Beta-blocker initially held due to low blood pressure. Has been resumed. Statin on hold. Hx of GI bleed. Cont PPI. Monitor Hgb. Mod to severe protein calorie malnutrition. Supplement as tolerates. -Speech therapy evaluation. Start diet after that. DVT Prophylaxis: heparin  Diet: ADULT DIET; Dysphagia - Pureed  Code Status: Full Code        Prior code status discussions - pt and family insist on full code. PCU patient.           Lamin Hendrix MD 4/7/2022 10:53 AM

## 2022-04-07 NOTE — PROGRESS NOTES
Pulmonary & Critical Care Medicine ICU Progress Note    CC: Sepsis    Events of Last 24 hours: Tolerating Bipap overnight with break during the day  Diuresed well again. Vascular lines: IV: L IJ  3/31    Mechanical ventilation 4/2-4/4/22    Vent Mode: PS Rate Set: 0 bmp/Vt Ordered: 330 mL/ /FiO2 : 30 %  Recent Labs     04/04/22  1003 04/05/22  0510   PHART 7.502* 7.439   TLR4MGM 34.4* 38.9   PO2ART 134.3* 83.9       IV:   sodium chloride 50 mL/hr at 04/06/22 1400    heparin (PORCINE) Infusion 1,060 Units/hr (04/07/22 0537)    sodium chloride      dextrose      sodium chloride         Vitals:  Blood pressure 110/69, pulse 110, temperature 98.6 °F (37 °C), temperature source Oral, resp. rate 24, height 5' 4\" (1.626 m), weight 130 lb 14.4 oz (59.4 kg), SpO2 96 %, not currently breastfeeding. on 2.5 lpm nasal cannula  General: ill appearing. Eyes: PERRL. No sclera icterus. No conjunctival injection. ENT: No discharge. Pharynx clear. Neck: Trachea midline. Normal thyroid. Resp: No accessory muscle use. Few crackles. No wheezing. No rhonchi. CV: Regular rate. Regular rhythm. No mumur or rub. 1+ LE edema. GI: Non-tender. Non-distended. No masses. Skin: Warm and dry. No nodule on exposed extremities. No rash on exposed extremities. Neuro: Moves all extremities. Follows commands.   Psych:alert and oriented      Scheduled Meds:   furosemide  40 mg IntraVENous Daily    busPIRone  5 mg Oral TID    cefTRIAXone (ROCEPHIN) IV  2,000 mg IntraVENous Q24H    pantoprazole  40 mg IntraVENous Daily    QUEtiapine  25 mg Oral BID    metoprolol tartrate  25 mg Oral BID    levalbuterol  1.25 mg Nebulization 4x daily    insulin lispro  0-6 Units SubCUTAneous Q4H    fluticasone  1 spray Each Nostril Daily    vancomycin  125 mg Oral 4 times per day    atorvastatin  40 mg Oral Nightly    clopidogrel  75 mg Oral Daily    ferrous sulfate  325 mg Oral Daily with breakfast    sertraline  50 mg Oral Daily    sodium chloride flush  5-40 mL IntraVENous 2 times per day    predniSONE  5 mg Oral Daily with breakfast       Data:  CBC:   Recent Labs     04/05/22  0500 04/06/22  0410 04/07/22  0530   WBC 9.9 10.9 12.7*   HGB 9.6* 9.5* 9.3*   HCT 29.6* 29.1* 28.5*   MCV 91.8 90.3 89.7    225 230     BMP:   Recent Labs     04/05/22  0500 04/05/22  0500 04/05/22  1205 04/06/22  0410 04/07/22  0530   *  --   --  147* 143   K 3.3*   < > 4.4 3.1* 3.0*     --   --  102 101   CO2 27  --   --  35* 35*   BUN 24*  --   --  24* 18   CREATININE 1.1  --   --  1.1 1.0    < > = values in this interval not displayed. LIVER PROFILE:   No results for input(s): AST, ALT, LIPASE, BILIDIR, BILITOT, ALKPHOS in the last 72 hours. Invalid input(s): AMYLASE,  ALB    Microbiology:  3/30 UC E. Coli  3/30 BC E. Coli  3/30 COVID-19 not detected  3/31 C. difficile negative    Imaging:  CT abdomen/Pelvis 4/2  Mild body wall anasarca, small pleural effusions, and small amount of   abdominal and pelvic ascites, compatible with fluid overload   Scattered areas of colonic wall thickening are seen, either due to the partially contracted state of the colon or wall thickening from underlying Colitis. Posterior fundal fibroid versus thickening of the endometrial stripe. Recommend follow-up non urgent pelvic ultrasound for further characterization    CXR 4//22  Interval extubation with increasing interstitial airspace opacities   throughout.        ASSESSMENT:  · Acute hypoxemic respiratory failure  · Septic shock  · E. coli bacteremia  · E. coli UTI  · Acute kidney injury  · Hypernatremia  · A. fib with RVR  · History of history of C. difficile troponin  · COPD chronically on home O2 2.5 L and AVAPS- not in exacerbation   · Chronic anxiety with panic attacks  · CAD post CABG 2019        PLAN:  Bipap PRN and QHS  Supplemental oxygen to maintain SaO2 >92%; wean as tolerated   Home dose 5 mg prednisone   Replace K  Change lasix 40mg IV daily and add Diamox today  Continue 1/2 NS at 50cc/hr for hypernatremia  Completed CTX D3/3 and had Zosyn 4 days  Continue Vanc  mg PO QID for 7 days following completion of antibiotics.    Aspirin, Plavix, beta-blocker  Blood sugar control, with goal 140-180  Heparin gtt for Afib  DVT prophylaxis:  Lovenox  Floor patient

## 2022-04-07 NOTE — PROGRESS NOTES
4 Eyes Skin Assessment     The patient is being assess for   Transfer to New Unit  PCU  I agree that 2 RN's have performed a thorough Head to Toe Skin Assessment on the patient. ALL assessment sites listed below have been assessed. Areas assessed for pressure by both nurses:   [x]   Head, Face, and Ears   [x]   Shoulders, Back, and Chest, Abdomen  [x]   Arms, Elbows, and Hands   [x]   Coccyx, Sacrum, and Ischium  [x]   Legs, Feet, and Heels  Scattered bruises and very frail skin, nothing open      Skin Assessed Under all Medical Devices by both nurses:  O2 device tubing              All Mepilex Borders were peeled back and area peeked at by both nurses:  No: NA  Please list where Mepilex Borders are located:  NA               **SHARE this note so that the co-signing nurse is able to place an eSignature**    Co-signer eSignature: Electronically signed by Brittany Garcia RN on 4/7/22 at 4:10 PM EDT    Does the Patient have Skin Breakdown related to pressure?   No            Trey Prevention initiated:  Yes   Wound Care Orders initiated:  No      [facility] nurse consulted for Pressure Injury (Stage 3,4, Unstageable, DTI, NWPT, Complex wounds)and New or Established Ostomies:  No      Primary Nurse eSignature: Electronically signed by Stacey Carballo RN on 4/7/22 at 4:53 PM EDT

## 2022-04-08 LAB
ANION GAP SERPL CALCULATED.3IONS-SCNC: 9 MMOL/L (ref 3–16)
BANDED NEUTROPHILS RELATIVE PERCENT: 1 % (ref 0–7)
BASOPHILS ABSOLUTE: 0 K/UL (ref 0–0.2)
BASOPHILS RELATIVE PERCENT: 0 %
BUN BLDV-MCNC: 20 MG/DL (ref 7–20)
CALCIUM SERPL-MCNC: 8.6 MG/DL (ref 8.3–10.6)
CHLORIDE BLD-SCNC: 103 MMOL/L (ref 99–110)
CO2: 32 MMOL/L (ref 21–32)
CREAT SERPL-MCNC: 1.1 MG/DL (ref 0.6–1.2)
EOSINOPHILS ABSOLUTE: 0.3 K/UL (ref 0–0.6)
EOSINOPHILS RELATIVE PERCENT: 3 %
GFR AFRICAN AMERICAN: 58
GFR NON-AFRICAN AMERICAN: 48
GLUCOSE BLD-MCNC: 107 MG/DL (ref 70–99)
GLUCOSE BLD-MCNC: 109 MG/DL (ref 70–99)
GLUCOSE BLD-MCNC: 110 MG/DL (ref 70–99)
GLUCOSE BLD-MCNC: 115 MG/DL (ref 70–99)
GLUCOSE BLD-MCNC: 287 MG/DL (ref 70–99)
GLUCOSE BLD-MCNC: 95 MG/DL (ref 70–99)
GLUCOSE BLD-MCNC: 99 MG/DL (ref 70–99)
HCT VFR BLD CALC: 26.9 % (ref 36–48)
HEMOGLOBIN: 8.7 G/DL (ref 12–16)
LYMPHOCYTES ABSOLUTE: 0.6 K/UL (ref 1–5.1)
LYMPHOCYTES RELATIVE PERCENT: 5 %
MCH RBC QN AUTO: 29.5 PG (ref 26–34)
MCHC RBC AUTO-ENTMCNC: 32.2 G/DL (ref 31–36)
MCV RBC AUTO: 91.6 FL (ref 80–100)
MONOCYTES ABSOLUTE: 0.1 K/UL (ref 0–1.3)
MONOCYTES RELATIVE PERCENT: 1 %
NEUTROPHILS ABSOLUTE: 10 K/UL (ref 1.7–7.7)
NEUTROPHILS RELATIVE PERCENT: 90 %
PDW BLD-RTO: 13.4 % (ref 12.4–15.4)
PERFORMED ON: ABNORMAL
PERFORMED ON: NORMAL
PERFORMED ON: NORMAL
PLATELET # BLD: 229 K/UL (ref 135–450)
PLATELET SLIDE REVIEW: ADEQUATE
PMV BLD AUTO: 8 FL (ref 5–10.5)
POTASSIUM SERPL-SCNC: 3.2 MMOL/L (ref 3.5–5.1)
RBC # BLD: 2.94 M/UL (ref 4–5.2)
RBC # BLD: NORMAL 10*6/UL
SLIDE REVIEW: ABNORMAL
SODIUM BLD-SCNC: 144 MMOL/L (ref 136–145)
WBC # BLD: 11 K/UL (ref 4–11)

## 2022-04-08 PROCEDURE — 94761 N-INVAS EAR/PLS OXIMETRY MLT: CPT

## 2022-04-08 PROCEDURE — 6360000002 HC RX W HCPCS: Performed by: INTERNAL MEDICINE

## 2022-04-08 PROCEDURE — 6370000000 HC RX 637 (ALT 250 FOR IP): Performed by: INTERNAL MEDICINE

## 2022-04-08 PROCEDURE — 97530 THERAPEUTIC ACTIVITIES: CPT

## 2022-04-08 PROCEDURE — 2580000003 HC RX 258: Performed by: INTERNAL MEDICINE

## 2022-04-08 PROCEDURE — 2700000000 HC OXYGEN THERAPY PER DAY

## 2022-04-08 PROCEDURE — 99233 SBSQ HOSP IP/OBS HIGH 50: CPT | Performed by: INTERNAL MEDICINE

## 2022-04-08 PROCEDURE — 97535 SELF CARE MNGMENT TRAINING: CPT

## 2022-04-08 PROCEDURE — 2060000000 HC ICU INTERMEDIATE R&B

## 2022-04-08 PROCEDURE — 94640 AIRWAY INHALATION TREATMENT: CPT

## 2022-04-08 PROCEDURE — 80048 BASIC METABOLIC PNL TOTAL CA: CPT

## 2022-04-08 PROCEDURE — 85025 COMPLETE CBC W/AUTO DIFF WBC: CPT

## 2022-04-08 PROCEDURE — 92526 ORAL FUNCTION THERAPY: CPT

## 2022-04-08 PROCEDURE — 94660 CPAP INITIATION&MGMT: CPT

## 2022-04-08 PROCEDURE — 97110 THERAPEUTIC EXERCISES: CPT

## 2022-04-08 PROCEDURE — 2500000003 HC RX 250 WO HCPCS: Performed by: INTERNAL MEDICINE

## 2022-04-08 RX ORDER — LEVALBUTEROL TARTRATE 45 UG/1
1 AEROSOL, METERED ORAL EVERY 4 HOURS PRN
Status: DISCONTINUED | OUTPATIENT
Start: 2022-04-08 | End: 2022-04-09 | Stop reason: HOSPADM

## 2022-04-08 RX ADMIN — POTASSIUM CHLORIDE 20 MEQ: 400 INJECTION, SOLUTION INTRAVENOUS at 10:04

## 2022-04-08 RX ADMIN — BUSPIRONE HYDROCHLORIDE 5 MG: 5 TABLET ORAL at 08:18

## 2022-04-08 RX ADMIN — APIXABAN 2.5 MG: 5 TABLET, FILM COATED ORAL at 08:18

## 2022-04-08 RX ADMIN — ONDANSETRON HYDROCHLORIDE 4 MG: 2 INJECTION, SOLUTION INTRAMUSCULAR; INTRAVENOUS at 06:48

## 2022-04-08 RX ADMIN — Medication 125 MG: at 00:07

## 2022-04-08 RX ADMIN — CLOPIDOGREL BISULFATE 75 MG: 75 TABLET ORAL at 08:18

## 2022-04-08 RX ADMIN — FERROUS SULFATE TAB 325 MG (65 MG ELEMENTAL FE) 325 MG: 325 (65 FE) TAB at 08:18

## 2022-04-08 RX ADMIN — BUSPIRONE HYDROCHLORIDE 5 MG: 5 TABLET ORAL at 21:57

## 2022-04-08 RX ADMIN — Medication 125 MG: at 14:27

## 2022-04-08 RX ADMIN — APIXABAN 2.5 MG: 5 TABLET, FILM COATED ORAL at 21:57

## 2022-04-08 RX ADMIN — SERTRALINE HYDROCHLORIDE 50 MG: 50 TABLET ORAL at 08:18

## 2022-04-08 RX ADMIN — BUSPIRONE HYDROCHLORIDE 5 MG: 5 TABLET ORAL at 14:26

## 2022-04-08 RX ADMIN — SODIUM CHLORIDE, PRESERVATIVE FREE 10 ML: 5 INJECTION INTRAVENOUS at 21:58

## 2022-04-08 RX ADMIN — Medication 1 PUFF: at 15:42

## 2022-04-08 RX ADMIN — FUROSEMIDE 40 MG: 10 INJECTION, SOLUTION INTRAMUSCULAR; INTRAVENOUS at 08:21

## 2022-04-08 RX ADMIN — SALINE NASAL SPRAY 1 SPRAY: 1.5 SOLUTION NASAL at 22:01

## 2022-04-08 RX ADMIN — Medication 1 PUFF: at 19:53

## 2022-04-08 RX ADMIN — Medication 125 MG: at 03:29

## 2022-04-08 RX ADMIN — QUETIAPINE FUMARATE 25 MG: 25 TABLET ORAL at 21:57

## 2022-04-08 RX ADMIN — PANTOPRAZOLE SODIUM 40 MG: 40 TABLET, DELAYED RELEASE ORAL at 06:48

## 2022-04-08 RX ADMIN — POTASSIUM CHLORIDE 20 MEQ: 400 INJECTION, SOLUTION INTRAVENOUS at 08:18

## 2022-04-08 RX ADMIN — PREDNISONE 5 MG: 5 TABLET ORAL at 08:18

## 2022-04-08 RX ADMIN — Medication 125 MG: at 18:25

## 2022-04-08 RX ADMIN — ATORVASTATIN CALCIUM 40 MG: 40 TABLET, FILM COATED ORAL at 21:57

## 2022-04-08 RX ADMIN — LEVALBUTEROL 1.25 MG: 1.25 SOLUTION, CONCENTRATE RESPIRATORY (INHALATION) at 07:44

## 2022-04-08 RX ADMIN — QUETIAPINE FUMARATE 25 MG: 25 TABLET ORAL at 08:18

## 2022-04-08 RX ADMIN — FLUTICASONE PROPIONATE 1 SPRAY: 50 SPRAY, METERED NASAL at 08:28

## 2022-04-08 RX ADMIN — ACETAMINOPHEN 650 MG: 325 TABLET ORAL at 21:56

## 2022-04-08 RX ADMIN — METOPROLOL TARTRATE 25 MG: 25 TABLET, FILM COATED ORAL at 08:18

## 2022-04-08 RX ADMIN — Medication 1 PUFF: at 12:10

## 2022-04-08 ASSESSMENT — PAIN SCALES - GENERAL
PAINLEVEL_OUTOF10: 0
PAINLEVEL_OUTOF10: 3

## 2022-04-08 ASSESSMENT — PAIN SCALES - WONG BAKER: WONGBAKER_NUMERICALRESPONSE: 0

## 2022-04-08 NOTE — PROGRESS NOTES
and % x 2 meals documented between 4/7-4/8; Abd is soft, nontender, BS are active; +BM 4/8- diarrhea; BUE/BLE +1 edema; skin is warm, swollen and red + ecchymosis; K+ and H/h are low; BG is elevated; GFR=48; patient has eliquis, lipitor, buspar, plavix, ferrous sulfate, lasix, humalog, lopressor, protonix, prednisone, seroquel, zoloft, and vanc ordered at this time; Wounds:  None       Current Nutrition Therapies:    ADULT DIET; Dysphagia - Pureed    Anthropometric Measures:  · Height: 5' 4\" (162.6 cm)  · Current Body Weight: 130 lb 9.6 oz (59.2 kg) (obtained 4/8/22; actual weight via bed scale)   · Admission Body Weight: 120 lb (54.4 kg) (obtained on 4/1/22; actual weight)    · Usual Body Weight: 120 lb (54.4 kg) (obtained on 4/1/22; actual weight)     · Ideal Body Weight: 120 lbs; % Ideal Body Weight 108.8 %   · BMI: 22.4  · BMI Categories: Normal Weight (BMI 22.0 to 24.9) age over 72       Nutrition Diagnosis:   · Inadequate oral intake related to inadequate protein-energy intake,impaired respiratory function,swallowing difficulty,altered GI function as evidenced by poor intake prior to admission,intake 26-50%,lab values,diarrhea    Nutrition Interventions:   Food and/or Nutrient Delivery:  Continue Current Diet  Nutrition Education/Counseling:  No recommendation at this time   Coordination of Nutrition Care:  Continue to monitor while inpatient,Speech Therapy    Goals:  patient will consume 50% or greater of meals on ADULT DIET;  Dysphagia - Pureed diet order x 3 meals per day       Nutrition Monitoring and Evaluation:   Behavioral-Environmental Outcomes:  None Identified   Food/Nutrient Intake Outcomes:  Diet Advancement/Tolerance,Food and Nutrient Intake  Physical Signs/Symptoms Outcomes:  Biochemical Data,Diarrhea,GI Status,Hemodynamic Status,Nutrition Focused Physical Findings,Weight,Meal Time Behavior     Discharge Planning:    Continue current diet     Electronically signed by Nelson Tripp MILENA ARCEO on 4/8/22 at 2:58 PM EDT    Contact: [phone]

## 2022-04-08 NOTE — PROGRESS NOTES
Pt is lying in bed with their eyes closed. Respirations are easy and even. Pt on bipap at this time. Call light within reach bed in lowest position with the wheels locked. Will continue to monitor.  Felipe Arguello RN

## 2022-04-08 NOTE — PROGRESS NOTES
Pulmonary & Critical Care Medicine ICU Progress Note    CC: Sepsis    Events of Last 24 hours:   Used Bipap overnight     Vascular lines: IV: L IJ  3/31    Mechanical ventilation 4/2-4/4/22    Vitals:  Blood pressure 120/70, pulse 99, temperature 97.1 °F (36.2 °C), temperature source Oral, resp. rate 19, height 5' 4\" (1.626 m), weight 130 lb 9.6 oz (59.2 kg), SpO2 96 %, not currently breastfeeding. on 2 lpm nasal cannula  General: ill appearing. Eyes: PERRL. No sclera icterus. No conjunctival injection. ENT: No discharge. Pharynx clear. Neck: Trachea midline. Normal thyroid. Resp: No accessory muscle use. Few crackles. No wheezing. No rhonchi. CV: Regular rate. Regular rhythm. No mumur or rub. 1+ LE edema. GI: Non-tender. Non-distended. No masses. Skin: Warm and dry. No nodule on exposed extremities. No rash on exposed extremities. Neuro: Moves all extremities. Follows commands.   Psych:alert and oriented      Scheduled Meds:   apixaban  2.5 mg Oral BID    pantoprazole  40 mg Oral QAM AC    furosemide  40 mg IntraVENous Daily    busPIRone  5 mg Oral TID    QUEtiapine  25 mg Oral BID    metoprolol tartrate  25 mg Oral BID    levalbuterol  1.25 mg Nebulization 4x daily    insulin lispro  0-6 Units SubCUTAneous Q4H    fluticasone  1 spray Each Nostril Daily    vancomycin  125 mg Oral 4 times per day    atorvastatin  40 mg Oral Nightly    clopidogrel  75 mg Oral Daily    ferrous sulfate  325 mg Oral Daily with breakfast    sertraline  50 mg Oral Daily    sodium chloride flush  5-40 mL IntraVENous 2 times per day    predniSONE  5 mg Oral Daily with breakfast       Data:  CBC:   Recent Labs     04/06/22  0410 04/07/22  0530 04/08/22  0438   WBC 10.9 12.7* 11.0   HGB 9.5* 9.3* 8.7*   HCT 29.1* 28.5* 26.9*   MCV 90.3 89.7 91.6    230 229     BMP:   Recent Labs     04/06/22  0410 04/07/22  0530 04/08/22  0438   * 143 144   K 3.1* 3.0* 3.2*    101 103   CO2 35* 35* 32   BUN 24* 18 20   CREATININE 1.1 1.0 1.1     LIVER PROFILE:   No results for input(s): AST, ALT, LIPASE, BILIDIR, BILITOT, ALKPHOS in the last 72 hours. Invalid input(s): AMYLASE,  ALB    Microbiology:  3/30 UC E. Coli  3/30 BC E. Coli  3/30 COVID-19 not detected  3/31 C. difficile negative    Imaging:  CT abdomen/Pelvis 4/2  Mild body wall anasarca, small pleural effusions, and small amount of   abdominal and pelvic ascites, compatible with fluid overload   Scattered areas of colonic wall thickening are seen, either due to the partially contracted state of the colon or wall thickening from underlying Colitis. Posterior fundal fibroid versus thickening of the endometrial stripe. Recommend follow-up non urgent pelvic ultrasound for further characterization    CXR 4//22  Interval extubation with increasing interstitial airspace opacities   throughout. ASSESSMENT:  · Acute hypoxemic respiratory failure  · Septic shock  · E. coli bacteremia  · E. coli UTI  · Acute kidney injury  · Hypernatremia  · A. fib with RVR  · History of history of C. difficile troponin  · COPD chronically on home O2 2.5 L and AVAPS- not in exacerbation   · Chronic anxiety with panic attacks  · CAD post CABG 2019        PLAN:  Bipap PRN and QHS  Supplemental oxygen to maintain SaO2 >92%; wean as tolerated   Home dose 5 mg prednisone   Completed CTX D3/3 and had Zosyn 4 days  Continue Vanc  mg PO QID for 7 days following completion of antibiotics.    On xoponex  Blood sugar control, with goal 140-180  On Eliquis  Dc planning

## 2022-04-08 NOTE — CARE COORDINATION
INTERDISCIPLINARY PLAN OF CARE CONFERENCE    Date/Time: 4/8/2022 1:43 PM  Completed by: SANTO Mckeon   Case Management    Patient Name:  Kilo Miranda  YOB: 1940  Admitting Diagnosis: Septicemia ([facility] [address] [A41.9]  Elevated troponin [R77.8]  NADJA (acute kidney injury) ([facility] [address] [N17.9]  Sepsis ([facility] [address] [A41.9]     Admit Date/Time:  3/30/2022  1:14 PM    Chart reviewed. Interdisciplinary team contacted or reviewed plan related to patient progress and discharge plans. Disciplines included Case Management, Nursing, and Dietitian. Current Status: ongoing   PT/OT recommendation for discharge plan of care: continue to assess    Expected D/C Disposition:  Home  Confirmed plan with patient and/or family Yes confirmed with: (name) pt and pt's daughter Rosalia Madrigal via phone call     Discharge Plan Comments: Chart review completed. Met with pt at bedside. Pt states she will return home on discharge and wants to continue her home health care. She doesn't want to go to SNF or rehab facility as writer explained PT/OT recommendations are continue to assess at this time. She is agreeable to writer calling her daughter Rosalia Madrigal. She denied needs for CM at this time. Called and spoke with pt's daughter Rosalia Madrigal. Discussed the above with her and that pt wants to return home. Rosalia Madrigal confirmed pt will return home on discharge and has someone with her 24/7 to assist her. She denied needs for CM at this time.      Home O2 in place on admit: Yes

## 2022-04-08 NOTE — PROGRESS NOTES
Occupational Therapy Daily Treatment Note    Unit: PCU  Date:  4/8/2022  Patient Name:    Kilo Miranda  Admitting diagnosis:  Septicemia [facility]) [A41.9]  Elevated troponin [R77.8]  NADJA (acute kidney injury) ([facility] [address] [N17.9]  Sepsis ([facility] [address] [A41.9]  Admit Date:  3/30/2022  Precautions/Restrictions:  fall risk, IV, bed/chair alarm, bond catheter , supplemental O2 (2.5L), telemetry, continuous pulse ox and contact precautions        Discharge Recommendations: Continue to assess as able to tolerate transfer to chair  DME needs for discharge: continue to assess       Therapy recommendations for staff:   Assist x1 for bed mobility    AM-PAC Score: AM-PAC Inpatient Daily Activity Raw Score: 14  Home Health S4 Level: Level 1- Standard       Treatment Time:  0920-0959  Treatment number:  2    Total Treatment Time:   39 minutes    History of Present Illness: per H&P   81 y.o. female with a past medical h/o  hyperlipidemia asthma tobacco use COPD CAD GERD on 2 L at home at baseline brought in today by EMS with shortness of breath. Patient is a poor historian therefore it is difficult to obtain a full history.  She does report increased shortness of breath and productive cough over the past 2 days.  Denies chest pain or leg swelling.  Denies fevers chills nausea vomiting diarrhea.  Onset over the past 2 days.  Duration of symptoms have been persistent since onset.  Context includes shortness of breath.  No aggravating symptoms.  No alleviating symptoms.  Otherwise denies any other complaints.  The rest of the history at this time is difficult to obtain.     Subjective:  \"I will they would take this tube out (referring to rectal tube)\" Pt agreeable to trial sitting at EOB    Pain   Yes  Rating: moderate  Location:right side of abdomin  Pain Medicine Status: No request made      Bed Mobility: assist for management of lines  Supine to Sit:  SBA  Sit to Supine:  SBA  Rolling:           SBA  Scooting:        SBA    Transfer Training:   Sit to stand:   Not Tested  Stand to sit:  Not Tested  Bed to Chair:  Not Tested  Bed to [facility]:   Not Tested  Standard toilet:   Not Tested    Activity Tolerance   Pt completed therapy session with SOB noted with sitting at EOB  Sitting EOB: SpO2=88% on 2L, 90-91% on 2.5L; YC=[phone]. Supine: returned to 2L; SpO2=96%, HR=95    Balance  Sitting Balance :     SBA  Standing Balance   Not Tested    ADL Training:   Upper body dressing:  Not Tested  Upper body bathing:  Not Tested  Lower body dressing:  Not Tested  Lower body bathing:  Not Tested  Toileting:   Not Tested  Grooming/Hygiene:  Max A brush hair  Feeding :   SBA    Therapeutic Exercise:   Shoulder flex/ext:  x10  Shoulder horizontal abd/add:  x10  shoulder elevation:  x10    Patient Education:   Role of OT  Recommendations for DC    Positioning Needs: In bed, call light and needs in reach. Alarm Set    Family Present:  No    Assessment: Patient agreeable to therapy sitting at EOB with caution for rectal tube and bond. Patient with SOB after bed mobility, but recovered with boast in O2 per patient request. Recommend continued acute OT and continue to assess therapy needs at discharge as patient is able to increase activity level and participate in transfer to chair. GOALS  To be met in 3 Visits:  1). Bed to toilet/BSC: Min A  with AD as needed      To be met in 5 Visits:  1). Supine to/from Sit:             CGA, met 4/8/22  2). Upper Body Bathing:         SBA  3). Lower Body Bathing:         Min A   4). Upper Body Dressing:       SBA  5). Lower Body Dressing:       Min A -mod  6).  Pt to demonstrate UE exs x 15 reps with minimal cues      Plan: cont with POC      Kianna Khan, OTR/L [id number]      If patient discharges from this facility prior to next visit, this note will serve as the Discharge Summary

## 2022-04-08 NOTE — FLOWSHEET NOTE
04/08/22 1415   Vital Signs   Temp 97.3 °F (36.3 °C)   Temp Source Oral   Pulse 96   Heart Rate Source Monitor   Resp 18   /71   BP Location Right upper arm   Patient Position Semi fowlers   Level of Consciousness Alert (0)   MEWS Score 1   Patient Currently in Pain Denies   Pain Assessment   Pain Assessment 0-10   Pain Level 0   Burt-Baker Pain Rating 0   Oxygen Therapy   SpO2 99 %   O2 Device Nasal cannula   O2 Flow Rate (L/min) 2 L/min     Afternoon vitals completed. Meds given per MAR. Pt stable.     Sera Bustillo, RN

## 2022-04-08 NOTE — PROGRESS NOTES
Hospitalist Progress Note      PCP: Rashard Sánchez MD    Date of Admission: 3/30/2022    Chief Complaint: SOB from home via EMS. Hx of end stage COPD. Home o2 dependent - reports 2.5L baseline - increases to 3-4L when SOB     Recently treated for Cdiff diarrhea. Reported she finished Abx about a week ago - per home care notes Vanc was extended from 3/24-3/29 after the initial treatment and she finished it on 3/29. Pt reports foul smelling urine but denies dysuria. Subjective:     4/2  resp status worsened through the morning. Intubated urgently at bedside due to severe mixed resp and metabolic acidemia. 4/3  Intubated and sedated. Off levophed today. O2 sats 100% on FiO2 35% PEEP 5. Clinically appears much better and lung exam improved. 4/4-patient is extubated around noon today. Completely awake and alert. On 3 L of oxygen. Doing well. She is hungry. She wants to eat. Minimally confused. 4/5-patient is extubated 3 L yesterday. She became more hypoxic. She developed rapid A. fib. She was felt to be in fluid overload. She was given IV Lasix. Presently on BiPAP. Has started diuresing. 4/6-respiratory status improved. On nasal cannula oxygen. Good diuresis. T-max 99.3    4/7-used BiPAP last night. Feeling better. 4/8  Very poor memory, but awake alert and oriented. Persisting RLQ pain and back pain - suspect radicular. Will remove rectal tube today. Keep bond additional 24hrs - diuresing well. D/c IVF and keep lasix.          Medications:  Reviewed    Infusion Medications    sodium chloride 50 mL/hr at 04/06/22 1400    sodium chloride      dextrose      sodium chloride       Scheduled Medications    apixaban  2.5 mg Oral BID    pantoprazole  40 mg Oral QAM AC    furosemide  40 mg IntraVENous Daily    busPIRone  5 mg Oral TID    QUEtiapine  25 mg Oral BID    metoprolol tartrate  25 mg Oral BID    levalbuterol  1.25 mg Nebulization 4x daily    insulin lispro  0-6 Units SubCUTAneous Q4H    fluticasone  1 spray Each Nostril Daily    vancomycin  125 mg Oral 4 times per day    atorvastatin  40 mg Oral Nightly    clopidogrel  75 mg Oral Daily    ferrous sulfate  325 mg Oral Daily with breakfast    sertraline  50 mg Oral Daily    sodium chloride flush  5-40 mL IntraVENous 2 times per day    predniSONE  5 mg Oral Daily with breakfast     PRN Meds: levalbuterol, potassium chloride, magnesium sulfate, sodium chloride, levalbuterol, sodium chloride, glucose, glucagon (rDNA), dextrose, dextrose bolus (hypoglycemia) **OR** dextrose bolus (hypoglycemia), sodium chloride flush, sodium chloride, ondansetron **OR** ondansetron, polyethylene glycol, acetaminophen **OR** acetaminophen      Intake/Output Summary (Last 24 hours) at 4/8/2022 1210  Last data filed at 4/8/2022 1149  Gross per 24 hour   Intake 620 ml   Output 4650 ml   Net -4030 ml       Physical Exam Performed:    /70   Pulse 99   Temp 97.1 °F (36.2 °C) (Oral)   Resp 19   Ht 5' 4\" (1.626 m)   Wt 130 lb 9.6 oz (59.2 kg)   SpO2 96%   BMI 22.42 kg/m²     General appearance: Awake and alert. On nasal cannula oxygen. Doing better. Mentation normal.    HEENT: Pupils equal, round, and reactive to light. Conjunctivae/corneas clear. Neck: Supple, with full range of motion. No jugular venous distention. Trachea midline. Respiratory: Diminished breath sounds the bases. Few crackles. No wheezes or rhonchi. Cardiovascular: Regular rate and rhythm with normal S1/S2 without murmurs, rubs or gallops. Abdomen: Soft, non-tender, non-distended with normal bowel sounds. Musculoskeletal: No clubbing, cyanosis or edema bilaterally. Full range of motion without deformity. Skin: Skin color, texture, turgor normal.  No rashes or lesions. Neurologic:  Intubated and sedated.    Capillary Refill: Brisk,3 seconds, normal   Peripheral Pulses: +2 palpable, equal bilaterally Labs:   Recent Labs     04/06/22  0410 04/07/22  0530 04/08/22  0438   WBC 10.9 12.7* 11.0   HGB 9.5* 9.3* 8.7*   HCT 29.1* 28.5* 26.9*    230 229     Recent Labs     04/06/22  0410 04/07/22  0530 04/08/22  0438   * 143 144   K 3.1* 3.0* 3.2*    101 103   CO2 35* 35* 32   BUN 24* 18 20   CREATININE 1.1 1.0 1.1   CALCIUM 8.9 8.4 8.6     No results for input(s): AST, ALT, BILIDIR, BILITOT, ALKPHOS in the last 72 hours. No results for input(s): INR in the last 72 hours. No results for input(s): Verlena Aidan in the last 72 hours. Urinalysis:      Lab Results   Component Value Date    NITRU POSITIVE 03/30/2022    WBCUA  03/30/2022    BACTERIA 3+ 03/30/2022    RBCUA 3-4 03/30/2022    BLOODU MODERATE 03/30/2022    SPECGRAV 1.015 03/30/2022    GLUCOSEU Negative 03/30/2022       Radiology:  XR CHEST PORTABLE   Final Result   Interval extubation with increasing interstitial airspace opacities   throughout. XR CHEST PORTABLE   Final Result   Line and tubes as above. Stable appearance of bilateral interstitial opacities. XR CHEST PORTABLE   Final Result   1. Suboptimal examination due to multiple extraneous wires projecting over   the patient's chest. It appears devices are in similar position from prior. 2. Bilateral bronchial wall thickening with slightly increased   reticulonodular opacities which may reflect evolving infectious/inflammatory   airways process. XR CHEST PORTABLE   Final Result   Satisfactory line and tube placement.          CT ABDOMEN PELVIS WO CONTRAST Additional Contrast? None   Final Result   Mild body wall anasarca, small pleural effusions, and small amount of   abdominal and pelvic ascites, compatible with fluid overload      Scattered areas of colonic wall thickening are seen, either due to the   partially contracted state of the colon or wall thickening from underlying   colitis      Posterior fundal fibroid versus thickening of the endometrial stripe. Recommend follow-up non urgent pelvic ultrasound for further characterization. XR CHEST PORTABLE   Final Result   Interval placement of a left IJ approach central venous catheter with the tip   overlying the mid SVC, with no pneumothorax identified. XR CHEST PORTABLE   Final Result   Bilateral ill-defined linear pulmonary opacities could represent pulmonary   edema or infection                 Assessment/Plan:    Principal Problem:    Sepsis ([address]  Active Problems:    Elevated troponin    S/P CABG x 2    Tachycardia    Acute hypoxemic respiratory failure (HCC)    Septic shock (HCC)    Bacteremia    Urinary tract infection without hematuria    Atrial fibrillation with RVR (HCC)    Electrolyte disorder  Resolved Problems:    * No resolved hospital problems. *       Sepsis with severe sepsis and septic shock. Diarrhea - recent Cdiff infection, though Cdiff negative this admission. Bacteremia - Ecoli (MDRO) on blood cultures  And also on urine culture from 3/30  IV rocephin - to IV zosyn on 4/2. Continue Rocephin 2 g daily. Completed 4 days of Zosyn. Empiric PO vanc - will need to be on this while on other Abx and for at least 10 days after due to recent Cdiff. She is off vasopressors. 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 Acute on chronic hypoxic resp failure. Resolved. Decompensated 4/2 and intubated urgently. Extubated on 4/4/2022. Solumedrol IV. Xopenex due to severe sinus tachycardia. -Developed worsening respiratory failure and was placed on BiPAP  -Fluid overloaded. Received Lasix which helped. Change Lasix to 40 IV daily. Given a dose of Diamox for metabolic alkalosis. Started on half-normal saline for hypernatremia - now resolved - stop IVF. - reassess need for ongoing lasix over the weekend - she is diuresing very well today. - Respiratory status improved. Using BiPAP at night.       Possibly Afib   Sinus tachy this am.   Off dilt

## 2022-04-08 NOTE — FLOWSHEET NOTE
04/08/22 0800   Vital Signs   Temp 97.1 °F (36.2 °C)   Temp Source Oral   Pulse 99   Heart Rate Source Monitor   Resp 19   /70   BP Location Right upper arm   Patient Position Semi fowlers   Level of Consciousness Alert (0)   MEWS Score 1   Patient Currently in Pain Denies   Pain Assessment   Pain Assessment 0-10   Pain Level 0   Oxygen Therapy   SpO2 96 %   O2 Device Nasal cannula   Skin Assessment Clean, dry, & intact   O2 Flow Rate (L/min) 2 L/min       Pt assessment complete; see flow sheets. Vitals completed. meds given per [facility]. PRN potassium given via CVC for k of 3.2. Pt repositioned in bed. Pt stable. Pt denies any other care needs at this time.       Amy Steiner RN

## 2022-04-08 NOTE — PROGRESS NOTES
Inpatient Physical Therapy Daily Treatment Note    Unit: ICU  Date:  4/8/2022  Patient Name:    Mary Murdock  Admitting diagnosis:  Septicemia [facility]) [A41.9]  Elevated troponin [R77.8]  NADJA (acute kidney injury) ([facility] [address] [N17.9]  Sepsis ([facility] [address] [A41.9]  Admit Date:  3/30/2022  Precautions/Restrictions/WB Status/ Lines/ Wounds/ Oxygen: Fall risk, Lines -Supplemental O2 (2L, titrated to 2.5L during session), Purewick catheter and L IJ, rectal tube, Bill, Telemetry and Continuous pulse oximetry    Treatment Time:  9802-2999  Treatment Number:  2  Timed Code Treatment Minutes: 39 minutes  Total Treatment Minutes: 39  minutes    Patient Goals for Therapy: go home \"I hope\"      Discharge Recommendations: CTA pending inc participation (have not yet assessed OOB mobility 2/2 pt not agreeable)  DME needs for discharge: TBD, likely no new needs       Therapy recommendation for EMS Transport: requires transport by cot due to unknown transfer capability. Therapy recommendations for staff:   Encourage bed-level ROM to all 4 limbs for edema control. Encourage sitting up to EOB with Supervision. History of Present Illness: Per H&P 3/30 Dr. Watkins Sor: [de-identified] y.o. female with PMH below, presents with SOB, ZENDEJAS, increased O demand, productive cough. Pt has hx of COPD and is usually on 2L O2. She reports over the last couple of days she has been feeling increasingly SOB. Her O2 requirement has gone up to 4L. She was initially hypotensive and then later became hypertensive, markedy tachycardic/tachypnic. This was after IVF. She was give IV metoprolol. Her VS stabilized. She related this episode to anxiety. She denies CP. \"  Multiple admissions in 2021 at [facility] [place] [facility] for acute on chronic COPD. Was at [facility] in late Fall 2021, returned home in December and has been home since.     Home Health S4 Level Recommendation:  Level 1 Standard (CTA pending inc participation)   AM-PAC Mobility Score       AM-PAC Inpatient Mobility without Stair Climbing Raw Score : 16    Pain   No pain; discomfort with the rectal tube. Location: NA  Rating: NA /10  Pain Medicine Status: No request made     Cognition    A&O Person, Place and Situation   Able to follow 2 step commands    Subjective  Patient lying supine in bed with no family present. Pt agreeable to this PT/OT tx. Balance  Sitting:  Good ; Supervision  Comments: at EOB ~25 minutes, with little to no UE support. Participating in ADLs and drinking coffee. Able to weight shift in all directions WFL. Standing: Not tested; N/A  Comments: Pt declined. Bed Mobility   Supine to Sit:    SBA, rolling to L side, with close attention to rectal tube at pt request.  Sit to Supine:   SBA  Rolling:   SBA   Scooting in sitting: Not Tested; pt declined scooting forward while sitting at EOB  Scooting in supine: Total A with use of [place] bed lift. Bridging:  Yes, sufficient for sheet change under buttocks. Transfer Training  Pt declined. Sit to stand:   Not Tested  Stand to sit:   Not Tested  Bed to Chair:   Not Tested with use of N/A    Gait pt declined to ambulate; pt ambulated 0 ft. Stair Training deferred, pt unsafe/ not appropriate to complete stairs at this time    Activity Tolerance   Pt completed therapy session with No adverse symptoms noted w/activity   Sitting EOB: SpO2=88% on 2L, 90-91% on 2.5L; KU=[id number]. Supine: returned to 2L; SpO2=96%, HR=95. Positioning Needs   Pt in bed, alarm set, positioned in proper neutral alignment and pressure relief provided. Call light provided and all needs within reach    Exercises Initiated  Pt declined to participate  NA     Other  None. Patient/Family Education   Pt educated on role of inpatient PT, POC, importance of continued activity, calling for assist with mobility. Assessment  Pt required mod encouragement to sit up to EOB 2/2 discomfort with rectal tube. Once at EOB she sat 25' with good tolerance, good balance.  She reported mild shortness of breath but once titrated up from 2 to 2.5L O2 (which is her baseline), she recovered quickly and maintained good saturation. She declined a standing attempt however, again 2/2 the rectal tube. Per RN after session, tube has since been removed. Will evaluate OOB mobility next visit. Will CTA for d/c rec. Will need patient to participate in OOB mobility. Goals : To be met in 3 visits:  1). Independent with LE Ex x 10 reps    To be met in 6 visits:  1). Supine to/from sit: Modified Independent  2). Sit to/from stand: Independent  3). Bed to chair: Independent  4). Gait: Ambulate 50 ft.  with  Independent and use of LRAD (least restrictive assistive device)  5). Tolerate B LE exercises 3 sets of 10-15 reps  6). Ascend/descend 1 steps with Independent with use of hand rail unilateral and LRAD (least restrictive assistive device)    Rehabilitation Potential: Fair  Strengths for achieving goals include:   PLOF and Family Support   Barriers to achieving goals include:    Limited tolerance to activity    Plan    To be seen 3-5 x / week  while in acute care setting for therapeutic exercises, bed mobility, transfers, progressive gait training, balance training, and family/patient education. Signature: Yesica Treviño, PT, DPT    If patient discharges from this facility prior to next visit, this note will serve as the Discharge Summary.

## 2022-04-08 NOTE — PROGRESS NOTES
RT Inhaler-Nebulizer Bronchodilator Protocol Note    There is a bronchodilator order in the chart from a provider indicating to follow the RT Bronchodilator Protocol and there is an Initiate RT Inhaler-Nebulizer Bronchodilator Protocol order as well (see protocol at bottom of note). CXR Findings:  No results found. The findings from the last RT Protocol Assessment were as follows:   History Pulmonary Disease: (P) Chronic pulmonary disease  Respiratory Pattern: (P) Dyspnea on exertion or RR 21-25 bpm  Breath Sounds: (P) Slightly diminished and/or crackles  Cough: (P) Weak, non-productive  Indication for Bronchodilator Therapy: (P) Decreased or absent breath sounds  Bronchodilator Assessment Score: (P) 9    Aerosolized bronchodilator medication orders have been revised according to the RT Inhaler-Nebulizer Bronchodilator Protocol below. Respiratory Therapist to perform RT Therapy Protocol Assessment initially then follow the protocol. Repeat RT Therapy Protocol Assessment PRN for score 0-3 or on second treatment, BID, and PRN for scores above 3. No Indications - adjust the frequency to every 6 hours PRN wheezing or bronchospasm, if no treatments needed after 48 hours then discontinue using Per Protocol order mode. If indication present, adjust the RT bronchodilator orders based on the Bronchodilator Assessment Score as indicated below. Use Inhaler orders unless patient has one or more of the following: on home nebulizer, not able to hold breath for 10 seconds, is not alert and oriented, cannot activate and use MDI correctly, or respiratory rate 25 breaths per minute or more, then use the equivalent nebulizer order(s) with same Frequency and PRN reasons based on the score. If a patient is on this medication at home then do not decrease Frequency below that used at home.     0-3 - enter or revise RT bronchodilator order(s) to equivalent RT Bronchodilator order with Frequency of every 4 hours PRN for wheezing or increased work of breathing using Per Protocol order mode. 4-6 - enter or revise RT Bronchodilator order(s) to two equivalent RT bronchodilator orders with one order with BID Frequency and one order with Frequency of every 4 hours PRN wheezing or increased work of breathing using Per Protocol order mode. 7-10 - enter or revise RT Bronchodilator order(s) to two equivalent RT bronchodilator orders with one order with TID Frequency and one order with Frequency of every 4 hours PRN wheezing or increased work of breathing using Per Protocol order mode. 11-13 - enter or revise RT Bronchodilator order(s) to one equivalent RT bronchodilator order with QID Frequency and an Albuterol order with Frequency of every 4 hours PRN wheezing or increased work of breathing using Per Protocol order mode. Greater than 13 - enter or revise RT Bronchodilator order(s) to one equivalent RT bronchodilator order with every 4 hours Frequency and an Albuterol order with Frequency of every 2 hours PRN wheezing or increased work of breathing using Per Protocol order mode.          Electronically signed by Mookie Carlos RCP on 4/8/2022 at 7:48 AM

## 2022-04-08 NOTE — PROGRESS NOTES
Speech Language Pathology  Dysphagia Treatment/Follow-Up Note  Facility/Department: [address] [place] [address] ICU  Recommendations:  Solid Consistency: IDDSI 4 Puree Solids, per pt preference   Liquid Consistency: IDDSI 0 Thin Liquids - straws OK  Medication: Meds whole with thin liquids or puree    NAME:Terri Smith  : 1940 [de-identified] y.o.)   MRN: [id number]  ROOM: [place] [address]  ADMISSION DATE: 3/30/2022  PATIENT DIAGNOSIS(ES): Septicemia ([facility] [address] [A41.9]  Elevated troponin [R77.8]  NADJA (acute kidney injury) ([facility] [address] [N17.9]  Sepsis ([facility] [address] [A41.9]  Allergies   Allergen Reactions    Latex Other (See Comments)     Burning      Codeine Other (See Comments)     \"hallucinations\"       DATE ONSET: 3/30/2022    Pain: The patient does not complain of pain      Current Diet: ADULT DIET; Dysphagia - Pureed    Diet Tolerance:  Patient tolerating current diet level without signs/symptoms of aspiration. Dysphagia Treatment and Impression   Subjective: Pt seen in room at bedside with RN permission  · RN Report/Chart Review: Pt reports low appetite but accepting thin liquids and puree without difficulty noted or s/s of aspiration. Pt tolerating puree diet.  Respiratory Status: Pt with SPO2% of  96 on 2 LPM NC with RR of 19     Liquid PO Trials:   · IDDSI 0 Thin:  Assessed via cup: no anterior bolus loss , suspect functional A-P bolus transit, swallow timing subjectively appears timely, oral clearance grossly WFL and no clinical s/s of aspiration     · Solid PO Trials  · IDDSI 4 Puree:  no anterior bolus loss , suspect functional A-P bolus transit and swallow timing subjectively appears timely, oral clearance WFL, no clinical s/s of aspiration and vitals stable    · Pt polietly declined PO advancement trials. Pt prefers puree diet.       Education: SLP edu pt re: Role of SLP, Rationale for dysphagia tx, Recommended compensatory strategies, Aspiration precautions, POC, techniques to improve respiratory-swallow coordination and Importance of oral care to reduce adverse affects in the event of aspiration. Pt verbalized understanding, would benefit from ongoing education and RN aware of recommendations   Assessment: Pt tolerating recommended diet with no clinical s/s of aspiration. Good carryover of recommended compensatory strategies with pt independent in use of small bites/sips and slow rate of intake. Limited po intake at this time. Pt goal is to return to solids.  Recommendations: SLP recommends to continue with IDDSI 4 Puree Solids; IDDSI 0 Thin Liquids - straws OK; Meds whole with thin liquids or puree   Risk Management: upright for all intake, stay upright for at least 30 mins after intake, small bites/sips, distant supervision with intake, oral care 2-3x/day to reduce adverse affects in the event of aspiration, increase physical mobility as able, slow rate of intake, general aspiration precautions and hold PO and contact SLP if s/s of aspiration or worsening respiratory status develop. Dysphagia Goals:    Short Term Goals:  Timeframe for Short Term Goals: (5 days 04/09/2022)  Goal 1: The patient will tolerate recommended diet with no clinical s/s of aspiration 5/5 4/8/2022 : Goal addressed, see above. Ongoing, progressing. Goal 2: The patient will tolerate therapeutic diet upgrade trials with no clinical s/s of aspiration 5/5 4/8/2022 : Goal not addressed, see above. Ongoing, progressing. Goal 3: The patient will recall/perform recommended compensatory strategies given min cues  4/8/2022 : Goal addressed, see above. Ongoing, progressing.     Long Term Goals:   Timeframe for Long Term Goals: (7 days 04/11/2022)  Goal 1: The patient will tolerate least restrictive diet with no clinical s/s of aspiration or worsening respiratory/pulmonary status   4/8/2022 :Ongoing, progressing.     Speech/Language/Cog Goals: N/A      Recommendations:  Solid Consistency: IDDSI 4 Puree Solids  Liquid Consistency: IDDSI 0 Thin Liquids - straws OK  Medication: Meds whole with thin liquids or puree    Plan:    Continued Dysphagia treatment with goals per plan of care. Discharge Recommendations: TBD    If pt discharges from hospital prior to Speech/Swallowing discharge, this note serves as tx and discharge summary.      Total Treatment Time / Charges     Time in Time out Total Time / units   Cognitive Tx         Speech Tx      Dysphagia Tx 1104 1113 9 minutes /1 unit     Signature:  [facility]  Clinician    Co-Signing Supervisor:  Chanel Del Valle M.A., [address] [place] [address] Pathologist  Phone: [phone]

## 2022-04-08 NOTE — PROGRESS NOTES
04/07/22 2542   NIV Type   NIV Started/Stopped On   Equipment Type v60   Mode Bilevel   Mask Type Full face mask   Mask Size Small   Settings/Measurements   IPAP 18 cmH20   CPAP/EPAP 8 cmH2O   Rate Ordered 12   FiO2  30 %   Vt Exhaled 499 mL   Minute Volume 14.8 Liters   Mask Leak (lpm) 6 lpm   Comfort Level Good   Using Accessory Muscles No   SpO2 97   Breath Sounds   Right Upper Lobe Diminished   Right Middle Lobe Diminished   Right Lower Lobe Diminished   Left Upper Lobe Diminished   Left Lower Lobe Diminished   Alarm Settings   Alarms On Y   Press Low Alarm 5 cmH2O   High Pressure Alarm 30 cmH2O   Delay Alarm 20 sec(s)   Resp Rate Low Alarm 12   High Respiratory Rate 50 br/min

## 2022-04-08 NOTE — PROGRESS NOTES
Bedside report and transfer of care given to [facility]. Pt currently resting in bed with the call light within reach. Pt denies any other care needs at this time. Pt stable at this time.     Kerry Pickard RN

## 2022-04-09 VITALS
HEIGHT: 64 IN | DIASTOLIC BLOOD PRESSURE: 64 MMHG | SYSTOLIC BLOOD PRESSURE: 124 MMHG | WEIGHT: 127.2 LBS | HEART RATE: 106 BPM | TEMPERATURE: 98 F | RESPIRATION RATE: 18 BRPM | BODY MASS INDEX: 21.72 KG/M2 | OXYGEN SATURATION: 96 %

## 2022-04-09 LAB
ANION GAP SERPL CALCULATED.3IONS-SCNC: 6 MMOL/L (ref 3–16)
ATYPICAL LYMPHOCYTE RELATIVE PERCENT: 2 % (ref 0–6)
BASOPHILS ABSOLUTE: 0 K/UL (ref 0–0.2)
BASOPHILS RELATIVE PERCENT: 0 %
BUN BLDV-MCNC: 24 MG/DL (ref 7–20)
CALCIUM SERPL-MCNC: 8.6 MG/DL (ref 8.3–10.6)
CHLORIDE BLD-SCNC: 104 MMOL/L (ref 99–110)
CO2: 33 MMOL/L (ref 21–32)
CREAT SERPL-MCNC: 1.1 MG/DL (ref 0.6–1.2)
EOSINOPHILS ABSOLUTE: 0.1 K/UL (ref 0–0.6)
EOSINOPHILS RELATIVE PERCENT: 1 %
GFR AFRICAN AMERICAN: 58
GFR NON-AFRICAN AMERICAN: 48
GLUCOSE BLD-MCNC: 100 MG/DL (ref 70–99)
GLUCOSE BLD-MCNC: 123 MG/DL (ref 70–99)
GLUCOSE BLD-MCNC: 123 MG/DL (ref 70–99)
GLUCOSE BLD-MCNC: 92 MG/DL (ref 70–99)
GLUCOSE BLD-MCNC: 94 MG/DL (ref 70–99)
HCT VFR BLD CALC: 25.8 % (ref 36–48)
HEMOGLOBIN: 8.3 G/DL (ref 12–16)
HYPOCHROMIA: ABNORMAL
LYMPHOCYTES ABSOLUTE: 1 K/UL (ref 1–5.1)
LYMPHOCYTES RELATIVE PERCENT: 7 %
MCH RBC QN AUTO: 29.5 PG (ref 26–34)
MCHC RBC AUTO-ENTMCNC: 32.3 G/DL (ref 31–36)
MCV RBC AUTO: 91.3 FL (ref 80–100)
METAMYELOCYTES RELATIVE PERCENT: 1 %
MONOCYTES ABSOLUTE: 0.3 K/UL (ref 0–1.3)
MONOCYTES RELATIVE PERCENT: 3 %
MYELOCYTE PERCENT: 1 %
NEUTROPHILS ABSOLUTE: 10.1 K/UL (ref 1.7–7.7)
NEUTROPHILS RELATIVE PERCENT: 85 %
PDW BLD-RTO: 13.7 % (ref 12.4–15.4)
PERFORMED ON: ABNORMAL
PERFORMED ON: NORMAL
PLATELET # BLD: 249 K/UL (ref 135–450)
PLATELET SLIDE REVIEW: ADEQUATE
PMV BLD AUTO: 8.1 FL (ref 5–10.5)
POIKILOCYTES: ABNORMAL
POTASSIUM SERPL-SCNC: 3.6 MMOL/L (ref 3.5–5.1)
RBC # BLD: 2.82 M/UL (ref 4–5.2)
SARS-COV-2, NAAT: NOT DETECTED
SLIDE REVIEW: ABNORMAL
SODIUM BLD-SCNC: 143 MMOL/L (ref 136–145)
WBC # BLD: 11.6 K/UL (ref 4–11)

## 2022-04-09 PROCEDURE — 80048 BASIC METABOLIC PNL TOTAL CA: CPT

## 2022-04-09 PROCEDURE — 85025 COMPLETE CBC W/AUTO DIFF WBC: CPT

## 2022-04-09 PROCEDURE — 6370000000 HC RX 637 (ALT 250 FOR IP): Performed by: INTERNAL MEDICINE

## 2022-04-09 PROCEDURE — 97530 THERAPEUTIC ACTIVITIES: CPT

## 2022-04-09 PROCEDURE — 6360000002 HC RX W HCPCS: Performed by: INTERNAL MEDICINE

## 2022-04-09 PROCEDURE — 87635 SARS-COV-2 COVID-19 AMP PRB: CPT

## 2022-04-09 PROCEDURE — 94640 AIRWAY INHALATION TREATMENT: CPT

## 2022-04-09 PROCEDURE — 94660 CPAP INITIATION&MGMT: CPT

## 2022-04-09 PROCEDURE — 99239 HOSP IP/OBS DSCHRG MGMT >30: CPT | Performed by: INTERNAL MEDICINE

## 2022-04-09 PROCEDURE — 97535 SELF CARE MNGMENT TRAINING: CPT

## 2022-04-09 PROCEDURE — 94761 N-INVAS EAR/PLS OXIMETRY MLT: CPT

## 2022-04-09 PROCEDURE — 99233 SBSQ HOSP IP/OBS HIGH 50: CPT | Performed by: INTERNAL MEDICINE

## 2022-04-09 PROCEDURE — 2700000000 HC OXYGEN THERAPY PER DAY

## 2022-04-09 RX ORDER — VANCOMYCIN HYDROCHLORIDE 125 MG/1
125 CAPSULE ORAL 4 TIMES DAILY
Qty: 40 CAPSULE | Refills: 0 | Status: SHIPPED | OUTPATIENT
Start: 2022-04-09 | End: 2022-04-19

## 2022-04-09 RX ORDER — ATORVASTATIN CALCIUM 40 MG/1
40 TABLET, FILM COATED ORAL NIGHTLY
Qty: 30 TABLET | Refills: 3 | Status: SHIPPED | OUTPATIENT
Start: 2022-04-09

## 2022-04-09 RX ADMIN — Medication 1 PUFF: at 11:41

## 2022-04-09 RX ADMIN — Medication 125 MG: at 14:14

## 2022-04-09 RX ADMIN — CLOPIDOGREL BISULFATE 75 MG: 75 TABLET ORAL at 09:26

## 2022-04-09 RX ADMIN — FLUTICASONE PROPIONATE 1 SPRAY: 50 SPRAY, METERED NASAL at 09:53

## 2022-04-09 RX ADMIN — QUETIAPINE FUMARATE 25 MG: 25 TABLET ORAL at 09:26

## 2022-04-09 RX ADMIN — SERTRALINE HYDROCHLORIDE 50 MG: 50 TABLET ORAL at 09:40

## 2022-04-09 RX ADMIN — APIXABAN 2.5 MG: 5 TABLET, FILM COATED ORAL at 09:39

## 2022-04-09 RX ADMIN — Medication 125 MG: at 00:16

## 2022-04-09 RX ADMIN — Medication 1 PUFF: at 15:30

## 2022-04-09 RX ADMIN — FUROSEMIDE 40 MG: 10 INJECTION, SOLUTION INTRAMUSCULAR; INTRAVENOUS at 09:52

## 2022-04-09 RX ADMIN — BUSPIRONE HYDROCHLORIDE 5 MG: 5 TABLET ORAL at 14:14

## 2022-04-09 RX ADMIN — PREDNISONE 5 MG: 5 TABLET ORAL at 09:53

## 2022-04-09 RX ADMIN — BUSPIRONE HYDROCHLORIDE 5 MG: 5 TABLET ORAL at 09:26

## 2022-04-09 RX ADMIN — Medication 1 PUFF: at 07:30

## 2022-04-09 RX ADMIN — Medication 125 MG: at 06:15

## 2022-04-09 RX ADMIN — PANTOPRAZOLE SODIUM 40 MG: 40 TABLET, DELAYED RELEASE ORAL at 06:15

## 2022-04-09 RX ADMIN — FERROUS SULFATE TAB 325 MG (65 MG ELEMENTAL FE) 325 MG: 325 (65 FE) TAB at 09:53

## 2022-04-09 ASSESSMENT — PAIN SCALES - GENERAL
PAINLEVEL_OUTOF10: 0

## 2022-04-09 NOTE — DISCHARGE SUMMARY
Name:  Italia Carranza  Room:  /8658-45  MRN:    [id number]    Discharge Summary      This discharge summary is in conjunction with a complete physical exam done on the day of discharge. Discharging Physician: Sera Galicia MD      Admit: 3/30/2022  Discharge:   4/9/2022     Diagnoses this Admission    Principal Problem:    Sepsis ([address]  Active Problems:    Elevated troponin    S/P CABG x 2    Tachycardia    Acute hypoxemic respiratory failure (HCC)    Septic shock (HCC)    Bacteremia    Urinary tract infection without hematuria    Atrial fibrillation with RVR (HCC)    Electrolyte disorder  Resolved Problems:    * No resolved hospital problems. *      Procedures (Please Review Full Report for Details)  Intubation  Central line    Consults    PHARMACY TO DOSE VANCOMYCIN  IP CONSULT TO HOSPITALIST  PHARMACY TO DOSE VANCOMYCIN  IP CONSULT TO CARDIOLOGY  IP CONSULT TO PULMONOLOGY  IP CONSULT TO CASE MANAGEMENT  IP CONSULT TO SOCIAL WORK  IP CONSULT TO PHARMACY  IP CONSULT TO DIETITIAN      HPI:      The patient is a [de-identified] y.o. female with PMH below, presents with SOB, ZENDEJAS, increased O demand, productive cough. Pt has hx of COPD and is usually on 2L O2. She reports over the last couple of days she has been feeling increasingly SOB. Her O2 requirement has gone up to 4L. She was initially hypotensive and then later became hypertensive, markedy tachycardic/tachypnic. This was after IVF. She was give IV metoprolol. Her VS stabilized. She related this episode to anxiety. She denies CP. Physical Exam at Discharge:  /68   Pulse 77   Temp 96.9 °F (36.1 °C) (Axillary)   Resp 18   Ht 5' 4\" (1.626 m)   Wt 127 lb 3.2 oz (57.7 kg)   SpO2 95%   BMI 21.83 kg/m²       General appearance: Awake and alert. On nasal cannula oxygen. Doing better. Mentation normal.    HEENT: Pupils equal, round, and reactive to light. Conjunctivae/corneas clear. Neck: Supple, with full range of motion.  No jugular venous distention. Trachea midline. Respiratory: Diminished breath sounds the bases. Few crackles. No wheezes or rhonchi. Cardiovascular: Regular rate and rhythm with normal S1/S2 without murmurs, rubs or gallops. Abdomen: Soft, non-tender, non-distended with normal bowel sounds. Musculoskeletal: No clubbing, cyanosis or edema bilaterally. Full range of motion without deformity. Skin: Skin color, texture, turgor normal.  No rashes or lesions. Neurologic:  Intubated and sedated. Capillary Refill: Brisk,3 seconds, normal   Peripheral Pulses: +2 palpable, equal bilaterally       Hospital Course  Sepsis with severe sepsis and septic shock. Diarrhea - recent Cdiff infection, though Cdiff negative this admission. Bacteremia - Ecoli (MDRO) on blood cultures  And also on urine culture from 3/30  IV rocephin - to IV zosyn on 4/2. Continue Rocephin 2 g daily. Completed 4 days of Zosyn. Empiric PO vanc - will need to be on this while on other Abx and for at least 10 days after due to recent Cdiff. She is off vasopressors. 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 Acute on chronic hypoxic resp failure. Resolved. Decompensated 4/2 and intubated urgently. Extubated on 4/4/2022. Solumedrol IV. Xopenex due to severe sinus tachycardia. -Developed worsening respiratory failure and was placed on BiPAP  -Fluid overloaded. Received Lasix which helped. Change Lasix to 40 IV daily. Given a dose of Diamox for metabolic alkalosis. Started on half-normal saline for hypernatremia - now resolved - stop IVF. - reassess need for ongoing lasix over the weekend - she is diuresing very well today. - Respiratory status improved. Using BiPAP at night.        Possibly Afib   Sinus tachy this am.   Off dilt gtt. On heparin gtt. transition to Eliquis.        Acute Acidemia 4/2 - much better now   pH 7.08, mixed respiratory and metabolic acidosis resulting in severe acidemia.    1 amp of bicarb   Intubated 4/2. Extubated on 4/4/2022. Overnight BiPAP.     Hypokalemia. Replace electrolytes.        Mood disorder on zoloft.         CAD  On plavix. Beta-blocker initially held due to low blood pressure. Has been resumed. Statin on hold. RESUME AT DISCHARGE        Hx of GI bleed. Cont PPI. Monitored Hgb.         Mod to severe protein calorie malnutrition. Supplement as tolerates. -Speech therapy evaluation. Start diet after that.        DVT Prophylaxis: heparin     Prior code status discussions - pt and family insist on full code.        CBC:   Recent Labs     04/07/22  0530 04/08/22  0438 04/09/22  0515   WBC 12.7* 11.0 11.6*   HGB 9.3* 8.7* 8.3*   HCT 28.5* 26.9* 25.8*   MCV 89.7 91.6 91.3    229 249     BMP:   Recent Labs     04/07/22  0530 04/08/22  0438 04/09/22  0515    144 143   K 3.0* 3.2* 3.6    103 104   CO2 35* 32 33*   BUN 18 20 24*   CREATININE 1.0 1.1 1.1     Cultures  COVID: not detected  Blood: E. Coli  Urine: E. Coli  C-diff: negative    Radiology  XR CHEST PORTABLE   Final Result   Interval extubation with increasing interstitial airspace opacities   throughout. XR CHEST PORTABLE   Final Result   Line and tubes as above. Stable appearance of bilateral interstitial opacities. XR CHEST PORTABLE   Final Result   1. Suboptimal examination due to multiple extraneous wires projecting over   the patient's chest. It appears devices are in similar position from prior. 2. Bilateral bronchial wall thickening with slightly increased   reticulonodular opacities which may reflect evolving infectious/inflammatory   airways process. XR CHEST PORTABLE   Final Result   Satisfactory line and tube placement.          CT ABDOMEN PELVIS WO CONTRAST Additional Contrast? None   Final Result   Mild body wall anasarca, small pleural effusions, and small amount of   abdominal and pelvic ascites, compatible with fluid overload      Scattered areas of colonic wall thickening are seen, either due to the   partially contracted state of the colon or wall thickening from underlying   colitis      Posterior fundal fibroid versus thickening of the endometrial stripe. Recommend follow-up non urgent pelvic ultrasound for further characterization. XR CHEST PORTABLE   Final Result   Interval placement of a left IJ approach central venous catheter with the tip   overlying the mid SVC, with no pneumothorax identified.          XR CHEST PORTABLE   Final Result   Bilateral ill-defined linear pulmonary opacities could represent pulmonary   edema or infection                Discharge Medications     Medication List      START taking these medications    apixaban 2.5 MG Tabs tablet  Commonly known as: ELIQUIS  Take 1 tablet by mouth 2 times daily     vancomycin 125 MG capsule  Commonly known as: VANCOCIN  Take 1 capsule by mouth 4 times daily for 10 days        CHANGE how you take these medications    atorvastatin 40 MG tablet  Commonly known as: LIPITOR  Take 1 tablet by mouth nightly  What changed:   · medication strength  · how much to take     docusate 100 MG Caps  Commonly known as: COLACE, DULCOLAX  Take 100 mg by mouth 2 times daily  What changed:   · when to take this  · reasons to take this        CONTINUE taking these medications    acetaminophen 500 MG tablet  Commonly known as: TYLENOL     albuterol (2.5 MG/3ML) 0.083% nebulizer solution  Commonly known as: PROVENTIL     busPIRone 5 MG tablet  Commonly known as: BUSPAR     clopidogrel 75 MG tablet  Commonly known as: PLAVIX  Take 1 tablet by mouth daily     Daliresp 500 MCG tablet  Generic drug: Roflumilast     ferrous sulfate 325 (65 Fe) MG tablet  Commonly known as: IRON 325     fluticasone 50 MCG/ACT nasal spray  Commonly known as: FLONASE  1 spray by Each Nostril route daily as needed for Rhinitis     furosemide 20 MG tablet  Commonly known as: LASIX  Take 1 tablet by mouth See Admin Instructions Tuesday, friday     magnesium oxide 400 (241.3 Mg) MG Tabs tablet  Commonly known as: MAG-OX  Take 1 tablet by mouth daily     metoprolol tartrate 25 MG tablet  Commonly known as: LOPRESSOR  Take 1 tablet by mouth 2 times daily     Mucinex 600 MG extended release tablet  Generic drug: guaiFENesin     ondansetron 4 MG disintegrating tablet  Commonly known as: ZOFRAN-ODT  Take 1 tablet by mouth every 8 hours as needed for Nausea or Vomiting     OXYGEN     pantoprazole 40 MG tablet  Commonly known as: PROTONIX     predniSONE 5 MG tablet  Commonly known as: DELTASONE     sertraline 50 MG tablet  Commonly known as: ZOLOFT     Trelegy Ellipta 200-62.5-25 MCG/INH Aepb  Generic drug: Fluticasone-Umeclidin-Vilant           Where to Get Your Medications      These medications were sent to [facility] [address] [place] [address]    Phone: [phone]   · apixaban 2.5 MG Tabs tablet  · atorvastatin 40 MG tablet  · vancomycin 125 MG capsule           Discharge Condition/Location: Stable    Follow Up: Follow up with PCP. More than 30 mts spent.      Elridge Rubinstein, MD 4/9/2022 9:00 AM

## 2022-04-09 NOTE — PROGRESS NOTES
04/08/22 2331   NIV Type   Skin Protection for O2 Device Yes   NIV Started/Stopped On   Equipment Type v60   Mode Bilevel   Mask Type Full face mask   Mask Size Small   Settings/Measurements   IPAP 18 cmH20   CPAP/EPAP 8 cmH2O   Rate Ordered 12   Resp 17   FiO2  30 %   I Time/ I Time % 0.9 s   Vt Exhaled 412 mL   Minute Volume 10.2 Liters   Mask Leak (lpm) 11 lpm   Comfort Level Good   Using Accessory Muscles No   SpO2 97   Alarm Settings   Alarms On Y   Press Low Alarm 5 cmH2O   High Pressure Alarm 30 cmH2O   Delay Alarm 20 sec(s)   Resp Rate Low Alarm 12   High Respiratory Rate 45 br/min

## 2022-04-09 NOTE — FLOWSHEET NOTE
04/09/22 1400   Vital Signs   Temp 98 °F (36.7 °C)   Temp Source Oral   Pulse 106   Heart Rate Source Monitor   Resp 19   /64   BP Location Right upper arm   Patient Position Semi fowlers   Level of Consciousness Alert (0)   MEWS Score 2   Patient Currently in Pain Denies   Pain Assessment   Pain Assessment 0-10   Pain Level 0   Oxygen Therapy   SpO2 96 %   O2 Device Nasal cannula   O2 Flow Rate (L/min) 2.5 L/min     Afternoon vitals completed. Pt stable.     Andi Montoya, RN

## 2022-04-09 NOTE — FLOWSHEET NOTE
04/09/22 0915   Vital Signs   Temp 97.7 °F (36.5 °C)   Temp Source Oral   Pulse 78   Heart Rate Source Monitor   Resp 19   /69   BP Location Right upper arm   Patient Position Sitting   Level of Consciousness Alert (0)   MEWS Score 1   Patient Currently in Pain Denies   Oxygen Therapy   SpO2 94 %   O2 Device Nasal cannula   O2 Flow Rate (L/min) 2.5 L/min       Pt assessment complete; see flow sheets. Vitals completed. Meds given per MAR. Bill removed at this time per verbal order per MD. Pt stable.     Leila Gar RN

## 2022-04-09 NOTE — PROGRESS NOTES
Pulmonary & Critical Care Medicine ICU Progress Note    CC: Sepsis    Events of Last 24 hours:   Used Bipap overnight     Vascular lines: IV: L IJ  3/31    Mechanical ventilation 4/2-4/4/22    Vitals:  Blood pressure 109/69, pulse 78, temperature 97.7 °F (36.5 °C), temperature source Oral, resp. rate 18, height 5' 4\" (1.626 m), weight 127 lb 3.2 oz (57.7 kg), SpO2 97 %, not currently breastfeeding. on 2 lpm nasal cannula  General: ill appearing. Eyes: PERRL. No sclera icterus. No conjunctival injection. ENT: No discharge. Pharynx clear. Neck: Trachea midline. Normal thyroid. Resp: No accessory muscle use. Few crackles. No wheezing. No rhonchi. CV: Regular rate. Regular rhythm. No mumur or rub. 1+ LE edema. GI: Non-tender. Non-distended. No masses. Skin: Warm and dry. No nodule on exposed extremities. No rash on exposed extremities. Neuro: Moves all extremities. Follows commands.   Psych:alert and oriented      Scheduled Meds:   apixaban  2.5 mg Oral BID    pantoprazole  40 mg Oral QAM AC    furosemide  40 mg IntraVENous Daily    busPIRone  5 mg Oral TID    QUEtiapine  25 mg Oral BID    metoprolol tartrate  25 mg Oral BID    levalbuterol  1.25 mg Nebulization 4x daily    insulin lispro  0-6 Units SubCUTAneous Q4H    fluticasone  1 spray Each Nostril Daily    vancomycin  125 mg Oral 4 times per day    atorvastatin  40 mg Oral Nightly    clopidogrel  75 mg Oral Daily    ferrous sulfate  325 mg Oral Daily with breakfast    sertraline  50 mg Oral Daily    sodium chloride flush  5-40 mL IntraVENous 2 times per day    predniSONE  5 mg Oral Daily with breakfast       Data:  CBC:   Recent Labs     04/07/22  0530 04/08/22  0438 04/09/22  0515   WBC 12.7* 11.0 11.6*   HGB 9.3* 8.7* 8.3*   HCT 28.5* 26.9* 25.8*   MCV 89.7 91.6 91.3    229 249     BMP:   Recent Labs     04/07/22  0530 04/08/22  0438 04/09/22  0515    144 143   K 3.0* 3.2* 3.6    103 104   CO2 35* 32 33*   BUN

## 2022-04-09 NOTE — CARE COORDINATION
Pt was planning on going to home with [facility] and has not decided that she would like to go to a SNF, as PT/OT recommended,and wants to go to [facility]. CM called and left a VM for Emelia with [facility] to inquire if able to accept and accept today. Alexander Watt Awaiting a call back from [facility] with [facility]. Pt is active with [facility] for 2L continuous and NIV. Addendum 1233: Emelia with [facility] is reviewing chart and will let CM know if able to accept. Addendum 1500: per [facility] with [facility] she can accept pt and accept pt today. Pt will need a rapid covid test prior to admission. DISCHARGE ORDER  Date/Time 2022 12:35 PM  Completed by: Liz Harley RN, Case Management    Patient Name: Daryl Frank    : 1940      Admit order Date and Status:3/30/2022  Noted discharge order. (verify MD's last order for status of admission/Traditional Medicare 3 [place] Inpatient qualifying stay required for SNF)    Confirmed discharge plan with:              Patient:  Yes              When pt confirms DC plan does any support person need to be contacted by CM No if yes who______no                      Discharge to Facility: [address] phone number for staff giving report:   · Name: [facility])  · Address: [place] [address] [address] , [place], [place], [facility]  · Phone:[phone]  · Fax: [phone]       Pre-certification completed: not needed   Hospital Exemption Notification (HENS) completed: yes   Discharge orders and Continuity of Care faxed to facility:  [phone]      Transportation:               Medical Transport explained with choice list offered to pt/family. Choice:Yes(no preference)  Agency used: [facility]   time:   6273-1742      Pt/family/Nursing/Facility aware of  time: yes  Yes Names: pt, Namita Villegas RN, [facility] with [facility]  Ambulance form completed:  yes:      Comments:Faxed LUPE. /AVS to [phone]. Pt states that her family will bring her Trilegy to [facility] for her when she gets there. Rapid covid for SNF ordered and Brijesh Pop RN, informed. Pt is being d/c'd to [facility] today. Pt's O2 sats are 96% on 2.5L. Discharge timeout done with Brijesh Pop RN. All discharge needs and concerns addressed. Discharging nurse to complete LUPE, reconcile AVS, and place final copy with patient's discharge packet. Discharging RN to ensure that written prescriptions for  Level II medications are sent with patient to the facility as per protocol.

## 2022-04-09 NOTE — PROGRESS NOTES
Occupational Therapy Daily Treatment Note    Unit: PCU  Date:  4/9/2022  Patient Name:    Michael Fonseca  Admitting diagnosis:  Septicemia [facility]) [A41.9]  Elevated troponin [R77.8]  NADJA (acute kidney injury) ([facility] [address] [N17.9]  Sepsis ([facility] [address] [A41.9]  Admit Date:  3/30/2022  Precautions/Restrictions:  fall risk, IV, bed/chair alarm, supplemental O2 (2.5L), telemetry, continuous pulse ox and contact precautions      Discharge Recommendations: SNF  DME needs for discharge: defer to facility       Therapy recommendations for staff:   Assist x1 for transfer with RW bed to/from BSC/chair    AM-PAC Score: AM-PAC Inpatient Daily Activity Raw Score: 14  Home Health S4 Level: Level 1- Standard       Treatment Time:  0905-1004  Treatment number:  2    Total Treatment Time: 59 minutes    History of Present Illness: per H&P   81 y.o. female with a past medical h/o  hyperlipidemia asthma tobacco use COPD CAD GERD on 2 L at home at baseline brought in today by EMS with shortness of breath. Patient is a poor historian therefore it is difficult to obtain a full history.  She does report increased shortness of breath and productive cough over the past 2 days.  Denies chest pain or leg swelling.  Denies fevers chills nausea vomiting diarrhea.  Onset over the past 2 days.  Duration of symptoms have been persistent since onset.  Context includes shortness of breath.  No aggravating symptoms.  No alleviating symptoms.  Otherwise denies any other complaints.  The rest of the history at this time is difficult to obtain.     Subjective:  Pt was found supine in bed today. Was receptive to OT treatment. Reports she is very worried about going home to her daughter, who has health issues. Is worried about having the help she needs.     Pain   Yes  Rating: moderate cramping  Location: abdomen  Pain Medicine Status: No request made      Bed Mobility: assist for management of lines  Supine to Sit:  IND  Sit to Supine:  IND  Rolling:          Not tested  Scooting:        IND     Transfer Training:   Sit to stand:   Min A  Stand to sit:  CGA  Bed to Chair:  Not Tested  Bed to [facility]:   [facility]  Standard toilet:   Not Tested    Activity Tolerance   Pt completed therapy session with SOB noted with sitting at EOB  Supine:  2 L NC  SpO2: 95%  HR: 107  BP: 109/69  Seated:     unable to initially obtain sitting EOB  Sitting on BSC after coughing:  BP: 144/113    SpO2: 84% on 2L increased to 4L, recovered in 2 mins. to 94%, then to 2.5 @95%   HR: 119    Balance  Sitting Balance :     Good EOB  Standing Balance   Fair +    ADL Training:   Upper body dressing:  Not Tested  Upper body bathing:  Not Tested  Lower body dressing: Assist to don B socks  Lower body bathing:  Not Tested  Toileting: Max A for wiping after BM  Grooming/Hygiene:  Max A brush hair  Feeding :   Not tested    Therapeutic Exercise:   NA    Patient Education:   Role of OT  Recommendations for DC    Positioning Needs: In bed, call light and needs in reach. Alarm Set    Family Present:  No    Assessment: Patient tolerated treatment session well today. Continues with diarrhea and decreased activity tolerance. Recommend continued acute OT and continue to assess therapy needs at discharge as patient is able to increase activity level and participate in transfer to chair. Recommending SNF upon discharge as patient functioning well below baseline, demonstrates good rehab potential and unable to return home due to limited or no family support, inability to negotiate stairs to enter home/bedroom/bathroom and home environment not conducive to patient recovery. GOALS  To be met in 3 Visits:  1). Bed to toilet/BSC: Min A  with AD as needed MET 4/9/22     To be met in 5 Visits:  1). Supine to/from Sit:            CGA, met 4/8/22  2). Upper Body Bathing:         SBA  3). Lower Body Bathing:         Min A   4). Upper Body Dressing:       SBA  5). Lower Body Dressing:       Min A -mod  6).  Pt to demonstrate UE exs x 15 reps with minimal cues      Plan: cont with [address] MS, OTR/L  #[id number]        If patient discharges from this facility prior to next visit, this note will serve as the Discharge Summary

## 2022-04-09 NOTE — PROGRESS NOTES
Speech Language Pathology  ST attempting follow-up for possible diet advancement. Per discussion with patient, patient would like to remain on a Puree diet d/t her top denture not fitting. Pt is tolerating current diet without difficulties. D/c order in chart. Will continue to follow if pt remains in house as indicated. Thank you.    Sandra De M.A, CCC-SLP/ CBIS   '

## 2022-04-09 NOTE — PROGRESS NOTES
patient discharges prior to next session this note will serve as a discharge summary. Please see below for the latest assessment towards goals. Treatment Time:  9:20-10:00  Treatment number: 3  Timed Code Treatment Minutes: 40 minutes  Total Treatment Minutes:  40  minutes    Cognition    A&O Person, Place, Time and Situation   Able to follow 2 step commands    Subjective  Patient lying supine in bed with no family present   Pt agreeable to this PT tx. States she is worried about going home,as her daughter just had surgery  Pain   Yes  Moderate, R side of abdomen  Pain Medicine Status: RN notified     Bed Mobility   Supine to Sit:    SBA  Sit to Supine:   SBA  Rolling:   Not Tested  Scooting:   Supervision    Transfer Training     Sit to stand:   Min A   Stand to sit:   Min A   Bed to Chair:   Min A  with use of gait belt    Gait Training gait deferred due to increased SOB with transfers/BSC; pt ambulated 0 ft. Stair Training deferred, pt unsafe/not appropriate to complete stairs at this time  Balance  Sitting:  Good ; Independent  Comments:     Standing: Fair -; Min A   Comments: using RW, difficulty with prolonged standing,increased SOB,required rest x 2 to complete pericare    Patient Education      Role of PT, POC, Discharge recommendations, DC recommendations, safety awareness, transfer techniques, pursed lip breathing, energy conservation, pacing activity and calling for assist with mobility. Positioning Needs       Pt in bed, alarm set, positioned in proper neutral alignment and pressure relief provided. Call light provided and all needs within reach      Activity Tolerance   Pt completed therapy session with SOB noted with activity. Supine:  2 L NC  SpO2: 95%  HR: 107  BP: 109/69  Seated:      unable to obtain in sitting EOB,   HR:   BP: 144/113  Standing:  SpO2: 84% on 2L increasedto 4L, recovered in 2 mins.  To 94%, then to 2.5 @95%   HR: 119  BP:  Other      Assessment :  Patient demonstrated good participation with this session. Patient appears to require in Fio2 from home oxygen requirements, to complete activity. Recommending SNF upon discharge as patient functioning well below baseline, demonstrates good rehab potential and unable to return home due to limited or no family support, inability to negotiate stairs to enter home/bedroom/bathroom, burden of care beyond caregiver ability and home environment not conducive to patient recovery. Goals : To be met in 3 visits:  1). Independent with LE Ex x 10 reps     To be met in 6 visits:  1). Supine to/from sit: Modified Independent  2). Sit to/from stand: Independent  3). Bed to chair: Independent  4). Gait: Ambulate 50 ft.  with  Independent and use of LRAD (least restrictive assistive device)  5). Tolerate B LE exercises 3 sets of 10-15 reps  6). Ascend/descend 1 steps with Independent with use of hand rail unilateral and LRAD (least restrictive assistive device)    Plan   Continue with plan of care. Pool Hernandez, PT #[id number]    If patient discharges from this facility prior to next visit, this note will serve as the Discharge Summary.

## 2022-04-09 NOTE — PROGRESS NOTES
Attempted to call [facility] to speak to the receiving nurse to give report. Unable to reach staff at this time. Phone number left for call back. Alpesh Valderrama RN          Patient educated on discharge instructions as well as new medications use, dosage, administration and possible side effects. Patient verified knowledge. IV removed without difficulty and dry dressing in place. Telemetry monitor removed and returned to [facility]. Pt left facility in stable condition to Skilled nursing facility with all of their personal belongings.      Alpesh Valderrama RN

## 2022-04-09 NOTE — DISCHARGE INSTR - COC
Continuity of Care Form    Patient Name: Baljit Batista   :    MRN:  [id number]    Admit date:  3/30/2022  Discharge date:  2022      Code Status Order: Full Code   Advance Directives:      Admitting Physician:  Sonal Stearns MD  PCP: Lesly Scott MD    Discharging Nurse: Alpesh Valderrama [facility] Unit/Room#: /2732-50  Discharging Unit Phone Number: [phone]      Emergency Contact:   Extended Emergency Contact Information  Primary Emergency Contact: [address] of [address] [facility] Phone: [phone]  Relation: Child  Secondary Emergency Contact: [address] [facility] Phone: [phone]  Mobile Phone: [phone]  Relation: Niece/Nephew    Past Surgical History:  Past Surgical History:   Procedure Laterality Date    BRONCHOSCOPY  2019    Dr. Heather Mckeon w/BAL    CARDIAC CATHETERIZATION  2019    Dr. Rae Herron 2020    COLONOSCOPY DIAGNOSTIC performed by Khai Godinez DO at [address] [facility] N/A 10/22/2021    COLONOSCOPY POLYPECTOMY SNARE/COLD BIOPSY performed by Kirstin Gilbert MD at [address] [facility] N/A 2019    OFF PUMP CORONARY ARTERY BYPASS GRAFTING X2, INTERNAL MAMMARY ARTERY, SAPHENOUS VEIN GRAFT performed by Jhoana West MD at [address] CATH  2021    IR MIDLINE CATH 2021 [address], PARTIAL Left     SIGMOIDOSCOPY  2020    4 bands    SIGMOIDOSCOPY N/A 2020    FLEX SIG W/ BANDING SIGMOIDOSCOPY DIAGNOSTIC FLEXIBLE performed by Khai Godinez DO at [address] [facility]       Immunization History:   Immunization History   Administered Date(s) Administered    COVID-19, Pfizer Purple top, DILUTE for use, 12+ yrs, 30mcg/0.3mL dose 2021, 2021    Influenza Virus Vaccine 2013, 10/17/2013    Influenza, Quadv, 6 mo and older, IM, PF (Flulaval, Fluarix) 03/19/2019    Influenza, Quadv, IM, PF (6 mo and older Fluzone, Flulaval, Fluarix, and 3 yrs and older Afluria) 10/04/2019, 10/14/2021    Pneumococcal Conjugate 13-valent ([facility]) 07/19/2017, 07/19/2017    Pneumococcal Polysaccharide ([id number]) 02/19/2015, 02/19/2015    Tdap (Boostrix, Adacel) 07/17/2018       Active Problems:  Patient Active Problem List   Diagnosis Code    Hyperlipidemia E78.5    Asthma J45.909    OA (osteoarthritis) M19.90    Tobacco use Z72.0    COPD exacerbation ([facility]) J44.1    Septicemia ([facility]) A41.9    Abnormal chest x-ray R93.89    COPD with acute exacerbation ([facility]) J44.1    Shortness of breath R06.02    Tobacco abuse Z72.0    Moderate malnutrition ([facility]) E44.0    NSTEMI (non-ST elevated myocardial infarction) ([facility]) I21.4    Elevated troponin R77.8    Acute respiratory failure with hypoxia ([facility]) J96.01    CAD (coronary artery disease) I25.10    Acute pulmonary edema ([facility]) J81.0    Pulmonary infiltrate R91.8    Elevated brain natriuretic peptide (BNP) level R79.89    Leukocytosis D72.829    Ischemic cardiomyopathy I25.5    Acute combined systolic and diastolic congestive heart failure (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) J15.212    GI bleed K92.2    Severe malnutrition ([facility]) E43    Chest pain R07.9    Chronic respiratory failure with hypoxia ([facility]) J96.11    Primary hypertension I10    Anemia D64.9    Acute respiratory failure ([facility]) J96.00    Acute respiratory distress R06.03    Volume overload E87.70    Demand ischemia ([facility]) I24.8    GERD (gastroesophageal reflux disease) K21.9    Acute respiratory failure with hypoxia and hypercapnia ([facility]) J96.01, J96.02    Shock ([facility]) R57.9    Pneumonia due to infectious organism J18.9    Acute on chronic respiratory failure with hypoxia and hypercapnia (HCC) J96.21, J96.22    Chronic obstructive pulmonary disease (HCC) J44.9    Acute on chronic respiratory failure with hypoxemia (HCC) J96.21    Chronic anxiety F41.9    Congestive heart failure (HCC) I50.9    Acute respiratory failure with hypoxia and hypercarbia (HCC) J96.01, J96.02    Acute kidney injury (HCC) N17.9    Elevated procalcitonin R79.89    COPD, severe (HCC) J44.9    Anxiety F41.9    Severe anxiety F41.9    Severe protein-calorie malnutrition ([facility] [address] E43    HCAP (healthcare-associated pneumonia) J18.9    Pleural effusion J90    Hyperglycemia R73.9    Sepsis ([facility]) A41.9    Tachycardia R00.0    Acute hypoxemic respiratory failure (HCC) J96.01    Septic shock ([facility]) A41.9, R65.21    Bacteremia R78.81    Urinary tract infection without hematuria N39.0    Atrial fibrillation with RVR ([facility]) I48.91    Electrolyte disorder E87.8       Isolation/Infection:   Isolation            Contact          Patient Infection Status       Infection Onset Added Last Indicated Last Indicated By Review Planned Expiration Resolved Resolved By    MDRO (multi-drug resistant organism) 03/29/22 03/31/22 03/30/22 Culture, Urine        Resolved    COVID-19 (Rule Out) 04/09/22 04/09/22 04/09/22 COVID-19, Rapid (Ordered)   04/09/22 Rule-Out Test Resulted    C-diff Rule Out 03/30/22 03/30/22 03/31/22 Clostridium difficile toxin/antigen (Ordered)   03/31/22 Rule-Out Test Resulted    COVID-19 (Rule Out) 03/30/22 03/30/22 03/30/22 COVID-19 & Influenza Combo (Ordered)   03/30/22 Rule-Out Test Resulted    C-diff (Clostridium difficile) 03/09/22 03/10/22 03/09/22 C. difficile toxin Molecular   03/30/22 Elaina Arriaga RN    C-diff Rule Out 03/09/22 03/10/22 03/09/22 C. difficile toxin Molecular (Ordered)   03/10/22 Rule-Out Test Resulted    C-diff Rule Out 03/09/22 03/09/22 03/09/22 Clostridium Difficile Toxin/Antigen (Ordered)   03/09/22 Rule-Out from Last 1 Encounters:   04/09/22 127 lb 3.2 oz (57.7 kg)     Mental Status:  oriented and alert    IV Access:  - None    Nursing Mobility/ADLs:  Walking   Assisted  Transfer  Assisted  Bathing  Assisted  Dressing  Assisted  Toileting  Assisted  Feeding  Assisted  Med Admin  Assisted  Med Delivery   whole    Wound Care Documentation and Therapy:  Wound 10/20/21 Hand Right;Dorsal skin tear to left hand, tendon exposed (Active)   Number of days: 171        Elimination:  Continence: Bowel: Yes  Bladder: Yes  Urinary Catheter: None   Colostomy/Ileostomy/Ileal Conduit: No  [REMOVED] Rectal Tube With balloon-Stool Appearance: Watery  [REMOVED] Rectal Tube With balloon-Stool Color: Green  [REMOVED] Rectal Tube With balloon-Stool Amount: Large    Date of Last BM: 4/9/2022      Intake/Output Summary (Last 24 hours) at 4/9/2022 1644  Last data filed at 4/9/2022 0857  Gross per 24 hour   Intake 380 ml   Output 950 ml   Net -570 ml     I/O last 3 completed shifts: In: 1100 [P.O.:1100]  Out: 3500 [Urine:2500; Stool:1000]    Safety Concerns: At Risk for Falls    Impairments/Disabilities:      None    Nutrition Therapy:  Current Nutrition Therapy:   - Oral Diet:  Carb Control 4 carbs/meal (1800kcals/day)    Routes of Feeding: Oral  Liquids: Thin Liquids  Daily Fluid Restriction: no  Last Modified Barium Swallow with Video (Video Swallowing Test): not done    Treatments at the Time of Hospital Discharge:   Respiratory Treatments: 2L  Oxygen Therapy:  is on oxygen at 2 L/min per nasal cannula.   Ventilator:    - BiPAP   IPAP: 18 cmH20, CPAP/EPAP: 8 cmH2O only when sleeping    Rehab Therapies: Physical Therapy and Occupational Therapy  Weight Bearing Status/Restrictions: No weight bearing restrictions  Other Medical Equipment (for information only, NOT a DME order):  walker  Other Treatments:     Patient's personal belongings (please select all that are sent with patient):  None    RN SIGNATURE:  Electronically signed by Mandi Lawrence RN on 4/9/22 at 4:46 PM EDT    CASE MANAGEMENT/SOCIAL WORK SECTION    Inpatient Status Date: ***    Readmission Risk Assessment Score:  Readmission Risk              Risk of Unplanned Readmission:  48           Discharging to Facility/ Agency   Name:   Address:  Phone:  Fax:    Dialysis Facility (if applicable)   Name:  Address:  Dialysis Schedule:  Phone:  Fax:    / signature: {Esignature:[id number]}    PHYSICIAN SECTION    Prognosis: {Prognosis:3443619535}    Condition at Discharge: [address] [place] [address] Patient Condition:[id number]}    Rehab Potential (if transferring to Rehab): {Prognosis:6364673303}    Recommended Labs or Other Treatments After Discharge: ***    Physician Certification: I certify the above information and transfer of Omar Dior  is necessary for the continuing treatment of the diagnosis listed and that she requires {Admit to Appropriate Level of Care:24661} for {GREATER/LESS:704994024} 30 days.      Update Admission H&P: {CHP DME Changes in OPFTR:618187110}    PHYSICIAN SIGNATURE:  {Esignature:[id number]}

## 2022-04-10 NOTE — ED PROVIDER NOTES
I independently examined and evaluated Federico Nazario. I personally saw the patient and performed a substantive portion of the visit including all aspects of the medical decision making    In brief, Federico Nazario is a [de-identified] y.o. female with a past medical history of COPD, NSTEMI, coronary artery disease, pulmonary edema, CHF, who presents to the ED complaining of shortness of breath. Patient is chronically on 2 L nasal cannula oxygen at baseline. She reports increased shortness of breath over the past 2 days. Also reports productive cough. Denies chest pain or leg swelling. Denies fever, vomiting, diarrhea, abdominal pain. No palliative or provocative factors. History somewhat limited due to patient baseline mental status      REVIEW OF SYSTEMS  All systems reviewed, pertinent positives per HPI otherwise noted to be negative. Focused exam revealed   PHYSICAL EXAM  BP 80/56  Pulse 102   Temp 98.2 °F  (Oral)   Resp 20   Ht 5' 4\" (1.626 m)   Wt 127 lb 3.2 oz (57.7 kg)   SpO2 98%   BMI 21.83 kg/m²    GENERAL APPEARANCE: Awake and alert. Cooperative. Ill-appearing. HENT: Normocephalic. Atraumatic. Mucous membranes are dry  NECK: Supple. Full range of motion of the neck without stiffness or pain. EYES: PERRL. EOM's grossly intact. HEART/CHEST: RRR. No murmurs. Chest wall is not tender to palpation. LUNGS: Respirations unlabored. Diminished breath sounds bilaterally. Patient is on increased oxygen requirement speaking comfortably in full sentences. ABDOMEN: No tenderness. Soft. Non-distended. No masses. No organomegaly. No guarding or rebound. MUSCULOSKELETAL: No extremity edema. Compartments soft. No deformity. No tenderness in the extremities. All extremities neurovascularly intact. SKIN: Warm and dry. No acute rashes. NEUROLOGICAL: Alert and oriented. No gross facial drooping. Strength 5/5, sensation intact. PSYCHIATRIC: Normal mood and affect.       ED course / MDM:   Overall ill appearing patient, presenting for shortness of breath. Physical exam remarkable for hypotension and hypoxia and generalized ill appearance and decreased breath sounds. I personally saw the patient and performed a substantive portion of the visit including all aspects of the medical decision making. Differential diagnosis includes but is not limited to: Pneumonia, pneumothorax, pleural effusion, ACS, CHF exacerbation, COPD exacerbation, asthma, pulmonary embolism, arrhythmia, anemia    EKG, laboratory studies, and imaging obtained. Workup showed:    XR CHEST PORTABLE   Final Result   Bilateral ill-defined linear pulmonary opacities could represent pulmonary   edema or infection           Chest x-ray with possible pneumonia versus pulmonary edema. No evidence of pneumothorax. The Ekg interpreted by me shows  normal sinus rhythm with a rate of 90  Axis is   Left axis deviation  QTc is  prolonged  Intervals and Durations are unremarkable. ST Segments: nonspecific changes  No significant change from prior EKG dated 10/12/21     Mild hypochloremia 95. No other significant electrolyte abnormalities. Patient with evidence of acute kidney injury. Creatinine is elevated 2.1. [place] [address]. Troponin elevated 0.16. Downtrending on repeat to 0.12. Suspect this is likely related to hypotension, continue to trend. May also be related to heart failure and acute kidney injury. Cardiology consulted while the patient is in the hospital.  Patient reports shortness of breath but no chest pain. Patient meets SIRS criteria with tachycardia, hypotension, and leukocytosis. Cultures drawn. Leukocytosis to 28.2. Lactate within normal limits. Procalcitonin significantly elevated at greater than 100. Flu and Covid swab negative. Urinalysis with evidence of infection, concern for urosepsis. Patient was given 30 cc/kg fluid bolus in the setting of hypertension.   Patient also started on antibiotics. Hypotension did respond to fluid resuscitation. At this time, no indication for central venous catheter while in the emergency department. Blood gas shows no acidemia or hypercarbia. Liver function testing overall unremarkable. Mild anemia to 11.6. No thrombocytopenia. Based on results of work-up, I am concerned for urosepsis with acute kidney injury, elevated BNP, and elevated troponin. At this time, do feel the patient requires admission for further work-up and management. Discussed the patient with hospital team.      CLINICAL IMPRESSION  1. Septicemia ([facility] [address]    2. NADJA (acute kidney injury) ([facility] [address]    3. Elevated troponin    4. Urinary tract infection in female    5. Acute on chronic respiratory failure with hypoxia (HCC)    6. Elevated brain natriuretic peptide (BNP) level        Blood pressure 105/49, pulse 106, temperature 98 °F (36.7 °C), temperature source Oral, resp. rate 18, height 5' 4\" (1.626 m), weight 127 lb 3.2 oz (57.7 kg), SpO2 97 %, not currently breastfeeding. Miguelito Burnette was admitted in fair condition. All diagnostic, treatment, and disposition decisions were made by myself in conjunction with the advanced practice provider. For all further details of the patient's emergency department visit, please see the advanced practice provider's documentation. Comment: Please note this report has been produced using speech recognition software and may contain errors related to that system including errors in grammar, punctuation, and spelling, as well as words and phrases that may be inappropriate. If there are any questions or concerns please feel free to contact the dictating provider for clarification.         Shelby Olea MD  04/10/22 6648

## 2022-04-30 PROBLEM — R79.89 ELEVATED TROPONIN: Status: RESOLVED | Noted: 2019-09-04 | Resolved: 2022-04-30

## 2022-04-30 PROBLEM — R77.8 ELEVATED TROPONIN: Status: RESOLVED | Noted: 2019-09-04 | Resolved: 2022-04-30

## 2023-01-04 ENCOUNTER — APPOINTMENT (OUTPATIENT)
Dept: GENERAL RADIOLOGY | Age: 83
DRG: 190 | End: 2023-01-04
Payer: MEDICARE

## 2023-01-04 ENCOUNTER — APPOINTMENT (OUTPATIENT)
Dept: CT IMAGING | Age: 83
DRG: 190 | End: 2023-01-04
Payer: MEDICARE

## 2023-01-04 ENCOUNTER — HOSPITAL ENCOUNTER (INPATIENT)
Age: 83
LOS: 11 days | Discharge: HOME HEALTH | DRG: 190 | End: 2023-01-15
Attending: EMERGENCY MEDICINE | Admitting: HOSPITALIST
Payer: MEDICARE

## 2023-01-04 DIAGNOSIS — R06.09 DOE (DYSPNEA ON EXERTION): ICD-10-CM

## 2023-01-04 DIAGNOSIS — N17.9 ACUTE KIDNEY INJURY (HCC): ICD-10-CM

## 2023-01-04 DIAGNOSIS — J96.21 ACUTE ON CHRONIC RESPIRATORY FAILURE WITH HYPOXIA (HCC): ICD-10-CM

## 2023-01-04 DIAGNOSIS — I95.9 TRANSIENT HYPOTENSION: ICD-10-CM

## 2023-01-04 DIAGNOSIS — R13.10 DYSPHAGIA, UNSPECIFIED TYPE: ICD-10-CM

## 2023-01-04 DIAGNOSIS — J44.1 COPD EXACERBATION (HCC): Primary | ICD-10-CM

## 2023-01-04 LAB
A/G RATIO: 1.1 (ref 1.1–2.2)
ALBUMIN SERPL-MCNC: 3.6 G/DL (ref 3.4–5)
ALP BLD-CCNC: 63 U/L (ref 40–129)
ALT SERPL-CCNC: 7 U/L (ref 10–40)
ANION GAP SERPL CALCULATED.3IONS-SCNC: 11 MMOL/L (ref 3–16)
AST SERPL-CCNC: 15 U/L (ref 15–37)
BASE EXCESS VENOUS: 3.1 MMOL/L (ref -3–3)
BASOPHILS ABSOLUTE: 0.1 K/UL (ref 0–0.2)
BASOPHILS RELATIVE PERCENT: 1 %
BILIRUB SERPL-MCNC: 0.3 MG/DL (ref 0–1)
BILIRUBIN URINE: NEGATIVE
BLOOD, URINE: ABNORMAL
BUN BLDV-MCNC: 25 MG/DL (ref 7–20)
CALCIUM SERPL-MCNC: 9.5 MG/DL (ref 8.3–10.6)
CARBOXYHEMOGLOBIN: 1.9 % (ref 0–1.5)
CHLORIDE BLD-SCNC: 101 MMOL/L (ref 99–110)
CLARITY: CLEAR
CO2: 28 MMOL/L (ref 21–32)
COLOR: YELLOW
CREAT SERPL-MCNC: 1.5 MG/DL (ref 0.6–1.2)
EKG ATRIAL RATE: 84 BPM
EKG DIAGNOSIS: NORMAL
EKG P AXIS: 80 DEGREES
EKG P-R INTERVAL: 158 MS
EKG Q-T INTERVAL: 368 MS
EKG QRS DURATION: 80 MS
EKG QTC CALCULATION (BAZETT): 434 MS
EKG R AXIS: -69 DEGREES
EKG T AXIS: 41 DEGREES
EKG VENTRICULAR RATE: 84 BPM
EOSINOPHILS ABSOLUTE: 0.1 K/UL (ref 0–0.6)
EOSINOPHILS RELATIVE PERCENT: 0.8 %
EPITHELIAL CELLS, UA: ABNORMAL /HPF (ref 0–5)
GFR SERPL CREATININE-BSD FRML MDRD: 35 ML/MIN/{1.73_M2}
GLUCOSE BLD-MCNC: 122 MG/DL (ref 70–99)
GLUCOSE BLD-MCNC: 87 MG/DL (ref 70–99)
GLUCOSE URINE: NEGATIVE MG/DL
HCO3 VENOUS: 31.1 MMOL/L (ref 23–29)
HCT VFR BLD CALC: 39.5 % (ref 36–48)
HEMOGLOBIN: 12.5 G/DL (ref 12–16)
INFLUENZA A: NOT DETECTED
INFLUENZA B: NOT DETECTED
KETONES, URINE: NEGATIVE MG/DL
LACTIC ACID: 1 MMOL/L (ref 0.4–2)
LEUKOCYTE ESTERASE, URINE: NEGATIVE
LIPASE: 54 U/L (ref 13–60)
LYMPHOCYTES ABSOLUTE: 0.7 K/UL (ref 1–5.1)
LYMPHOCYTES RELATIVE PERCENT: 8.4 %
MAGNESIUM: 1.7 MG/DL (ref 1.8–2.4)
MCH RBC QN AUTO: 30 PG (ref 26–34)
MCHC RBC AUTO-ENTMCNC: 31.6 G/DL (ref 31–36)
MCV RBC AUTO: 95.1 FL (ref 80–100)
METHEMOGLOBIN VENOUS: 0.6 %
MICROSCOPIC EXAMINATION: YES
MONOCYTES ABSOLUTE: 0.4 K/UL (ref 0–1.3)
MONOCYTES RELATIVE PERCENT: 4.8 %
NEUTROPHILS ABSOLUTE: 6.7 K/UL (ref 1.7–7.7)
NEUTROPHILS RELATIVE PERCENT: 85 %
NITRITE, URINE: NEGATIVE
O2 SAT, VEN: 69 %
O2 THERAPY: ABNORMAL
PCO2, VEN: 62.7 MMHG (ref 40–50)
PDW BLD-RTO: 13.8 % (ref 12.4–15.4)
PERFORMED ON: ABNORMAL
PH UA: 6 (ref 5–8)
PH VENOUS: 7.31 (ref 7.35–7.45)
PLATELET # BLD: 215 K/UL (ref 135–450)
PMV BLD AUTO: 7.9 FL (ref 5–10.5)
PO2, VEN: 39.8 MMHG (ref 25–40)
POTASSIUM SERPL-SCNC: 4.5 MMOL/L (ref 3.5–5.1)
PRO-BNP: 1728 PG/ML (ref 0–449)
PROCALCITONIN: 0.11 NG/ML (ref 0–0.15)
PROTEIN UA: NEGATIVE MG/DL
RBC # BLD: 4.15 M/UL (ref 4–5.2)
RBC UA: ABNORMAL /HPF (ref 0–4)
SARS-COV-2 RNA, RT PCR: NOT DETECTED
SODIUM BLD-SCNC: 140 MMOL/L (ref 136–145)
SPECIFIC GRAVITY UA: 1.02 (ref 1–1.03)
TCO2 CALC VENOUS: 33 MMOL/L
TOTAL PROTEIN: 7 G/DL (ref 6.4–8.2)
TROPONIN: 0.04 NG/ML
TROPONIN: 0.05 NG/ML
URINE TYPE: ABNORMAL
UROBILINOGEN, URINE: 0.2 E.U./DL
WBC # BLD: 7.9 K/UL (ref 4–11)
WBC UA: ABNORMAL /HPF (ref 0–5)

## 2023-01-04 PROCEDURE — 2700000000 HC OXYGEN THERAPY PER DAY

## 2023-01-04 PROCEDURE — 71045 X-RAY EXAM CHEST 1 VIEW: CPT

## 2023-01-04 PROCEDURE — 84145 PROCALCITONIN (PCT): CPT

## 2023-01-04 PROCEDURE — 6370000000 HC RX 637 (ALT 250 FOR IP): Performed by: EMERGENCY MEDICINE

## 2023-01-04 PROCEDURE — 94660 CPAP INITIATION&MGMT: CPT

## 2023-01-04 PROCEDURE — 83735 ASSAY OF MAGNESIUM: CPT

## 2023-01-04 PROCEDURE — 87636 SARSCOV2 & INF A&B AMP PRB: CPT

## 2023-01-04 PROCEDURE — 6360000002 HC RX W HCPCS: Performed by: EMERGENCY MEDICINE

## 2023-01-04 PROCEDURE — 84484 ASSAY OF TROPONIN QUANT: CPT

## 2023-01-04 PROCEDURE — 87040 BLOOD CULTURE FOR BACTERIA: CPT

## 2023-01-04 PROCEDURE — 96374 THER/PROPH/DIAG INJ IV PUSH: CPT

## 2023-01-04 PROCEDURE — 83605 ASSAY OF LACTIC ACID: CPT

## 2023-01-04 PROCEDURE — 81001 URINALYSIS AUTO W/SCOPE: CPT

## 2023-01-04 PROCEDURE — 85025 COMPLETE CBC W/AUTO DIFF WBC: CPT

## 2023-01-04 PROCEDURE — 2580000003 HC RX 258: Performed by: EMERGENCY MEDICINE

## 2023-01-04 PROCEDURE — 83690 ASSAY OF LIPASE: CPT

## 2023-01-04 PROCEDURE — 99285 EMERGENCY DEPT VISIT HI MDM: CPT

## 2023-01-04 PROCEDURE — 71250 CT THORAX DX C-: CPT

## 2023-01-04 PROCEDURE — 94761 N-INVAS EAR/PLS OXIMETRY MLT: CPT

## 2023-01-04 PROCEDURE — 94640 AIRWAY INHALATION TREATMENT: CPT

## 2023-01-04 PROCEDURE — 2060000000 HC ICU INTERMEDIATE R&B

## 2023-01-04 PROCEDURE — 6360000002 HC RX W HCPCS: Performed by: HOSPITALIST

## 2023-01-04 PROCEDURE — 93010 ELECTROCARDIOGRAM REPORT: CPT | Performed by: INTERNAL MEDICINE

## 2023-01-04 PROCEDURE — 80053 COMPREHEN METABOLIC PANEL: CPT

## 2023-01-04 PROCEDURE — 83880 ASSAY OF NATRIURETIC PEPTIDE: CPT

## 2023-01-04 PROCEDURE — 83036 HEMOGLOBIN GLYCOSYLATED A1C: CPT

## 2023-01-04 PROCEDURE — 82803 BLOOD GASES ANY COMBINATION: CPT

## 2023-01-04 PROCEDURE — 93005 ELECTROCARDIOGRAM TRACING: CPT | Performed by: EMERGENCY MEDICINE

## 2023-01-04 RX ORDER — 0.9 % SODIUM CHLORIDE 0.9 %
500 INTRAVENOUS SOLUTION INTRAVENOUS ONCE
Status: COMPLETED | OUTPATIENT
Start: 2023-01-04 | End: 2023-01-04

## 2023-01-04 RX ORDER — DEXAMETHASONE SODIUM PHOSPHATE 10 MG/ML
8 INJECTION INTRAMUSCULAR; INTRAVENOUS ONCE
Status: COMPLETED | OUTPATIENT
Start: 2023-01-04 | End: 2023-01-04

## 2023-01-04 RX ORDER — FUROSEMIDE 10 MG/ML
20 INJECTION INTRAMUSCULAR; INTRAVENOUS ONCE
Status: COMPLETED | OUTPATIENT
Start: 2023-01-04 | End: 2023-01-04

## 2023-01-04 RX ORDER — DEXTROSE MONOHYDRATE 100 MG/ML
INJECTION, SOLUTION INTRAVENOUS CONTINUOUS PRN
Status: DISCONTINUED | OUTPATIENT
Start: 2023-01-04 | End: 2023-01-15 | Stop reason: HOSPADM

## 2023-01-04 RX ORDER — IPRATROPIUM BROMIDE AND ALBUTEROL SULFATE 2.5; .5 MG/3ML; MG/3ML
1 SOLUTION RESPIRATORY (INHALATION) ONCE
Status: COMPLETED | OUTPATIENT
Start: 2023-01-04 | End: 2023-01-04

## 2023-01-04 RX ORDER — INSULIN LISPRO 100 [IU]/ML
0-8 INJECTION, SOLUTION INTRAVENOUS; SUBCUTANEOUS
Status: DISCONTINUED | OUTPATIENT
Start: 2023-01-04 | End: 2023-01-15 | Stop reason: HOSPADM

## 2023-01-04 RX ORDER — INSULIN LISPRO 100 [IU]/ML
0-4 INJECTION, SOLUTION INTRAVENOUS; SUBCUTANEOUS NIGHTLY
Status: DISCONTINUED | OUTPATIENT
Start: 2023-01-04 | End: 2023-01-15 | Stop reason: HOSPADM

## 2023-01-04 RX ADMIN — SODIUM CHLORIDE 500 ML: 9 INJECTION, SOLUTION INTRAVENOUS at 13:39

## 2023-01-04 RX ADMIN — DEXAMETHASONE SODIUM PHOSPHATE 8 MG: 10 INJECTION INTRAMUSCULAR; INTRAVENOUS at 15:53

## 2023-01-04 RX ADMIN — FUROSEMIDE 20 MG: 10 INJECTION, SOLUTION INTRAMUSCULAR; INTRAVENOUS at 17:07

## 2023-01-04 RX ADMIN — IPRATROPIUM BROMIDE AND ALBUTEROL SULFATE 1 AMPULE: .5; 2.5 SOLUTION RESPIRATORY (INHALATION) at 13:21

## 2023-01-04 ASSESSMENT — ENCOUNTER SYMPTOMS
SINUS PRESSURE: 1
BACK PAIN: 0
CHEST TIGHTNESS: 1
SHORTNESS OF BREATH: 1
COUGH: 1
WHEEZING: 1
ABDOMINAL PAIN: 0
NAUSEA: 1

## 2023-01-04 ASSESSMENT — PAIN - FUNCTIONAL ASSESSMENT
PAIN_FUNCTIONAL_ASSESSMENT: NONE - DENIES PAIN
PAIN_FUNCTIONAL_ASSESSMENT: NONE - DENIES PAIN

## 2023-01-04 NOTE — ED PROVIDER NOTES
[address] [facility]  ED  EMERGENCY DEPARTMENT ENCOUNTER        Pt Name: Ashly Garcia  MRN: [id number]  [place] 1940  Date of evaluation: 1/4/2023  Provider: Adelaide Levy MD  PCP: Rashard Sánchez MD      CHIEF COMPLAINT       Chief Complaint   Patient presents with    Shortness of Breath     Sinus infection x 3 days, woke up today feeling feverish, hx of copd, took buspar 45 min ago       HISTORY OFPRESENT ILLNESS   (Location/Symptom, Timing/Onset, Context/Setting, Quality, Duration, Modifying Factors,Severity)  Note limiting factors. Ashly Garcia is a 81 y.o. female presenting today due to concern for not feeling well over the last 3 days associated with sinus congestion but also dealing more short of breath with chest congestion as well to the point where she becomes very out of breath with trying to exert herself even after using the restroom. She does have a history of COPD. She did notice herself wheezing at home. She denies any chest pain. No headache. No falls or trauma. No abdominal pain although occasionally when she coughs she does feel nauseated. She is on Eliquis. She normally gets around without any assistance. Her caregiver is with her at this time. Her blood pressure is also normally in appropriate range with systolic around 315 but it has been low over the last day. Due to concern for generalized weakness with increasing shortness of breath and cough, she came to the ED for further evaluation by EMS. REVIEW OF SYSTEMS    (2-9 systems for level 4, 10 or more for level 5)     Review of Systems   Constitutional:  Negative for fever. HENT:  Positive for congestion and sinus pressure. Respiratory:  Positive for cough, chest tightness, shortness of breath and wheezing. Cardiovascular:  Negative for chest pain. Gastrointestinal:  Positive for nausea. Negative for abdominal pain. Genitourinary:  Positive for frequency.  Negative for flank pain.   Musculoskeletal:  Negative for back pain and neck pain. Skin:  Negative for wound (no reported falls or trauma). Neurological:  Positive for weakness (generalized) and light-headedness. Negative for dizziness, syncope, numbness and headaches. Hematological:  Bruises/bleeds easily. Psychiatric/Behavioral:  Negative for confusion. The patient is nervous/anxious. Positives and Pertinent negatives as per HPI.       PASTMEDICAL HISTORY     Past Medical History:   Diagnosis Date    NADJA (acute kidney injury) ([facility] [address]     Asthma     CAD (coronary artery disease)     CHF (congestive heart failure) ([facility])     unsure    Chronic anxiety     COPD (chronic obstructive pulmonary disease) ([facility])     GERD (gastroesophageal reflux disease) 9/9/2021    History of blood transfusion     Hyperlipidemia     Hypertension     MRSA (methicillin resistant staph aureus) culture positive 09/22/2019    + resp cx    OA (osteoarthritis) 8/12/2014    Thyroid disease          SURGICAL HISTORY       Past Surgical History:   Procedure Laterality Date    BRONCHOSCOPY  09/08/2019    Dr. Mindy Rm - w/BAL    CARDIAC CATHETERIZATION  09/05/2019    Dr. Patria Ballesteros N/A 2/24/2020    COLONOSCOPY DIAGNOSTIC performed by Ruslan Donovan DO at [address] N/A 10/22/2021    COLONOSCOPY POLYPECTOMY SNARE/COLD BIOPSY performed by Varghese Haines MD at [address] [facility] N/A 9/19/2019    OFF PUMP CORONARY ARTERY BYPASS GRAFTING X2, INTERNAL MAMMARY ARTERY, SAPHENOUS VEIN GRAFT performed by Jaz Walters MD at [address] CATH  9/20/2021    IR MIDLINE CATH 9/20/2021 [address] SPECIAL PROCEDURES    MASTECTOMY, PARTIAL Left     SIGMOIDOSCOPY  02/27/2020    4 bands    SIGMOIDOSCOPY N/A 2/27/2020    FLEX SIG W/ BANDING SIGMOIDOSCOPY DIAGNOSTIC FLEXIBLE performed by Ruslan Donovan DO at [address] [facility] Current Discharge Medication List        CONTINUE these medications which have NOT CHANGED    Details   apixaban (ELIQUIS) 2.5 MG TABS tablet Take 1 tablet by mouth 2 times daily  Qty: 60 tablet, Refills: 2      atorvastatin (LIPITOR) 40 MG tablet Take 1 tablet by mouth nightly  Qty: 30 tablet, Refills: 3      busPIRone (BUSPAR) 5 MG tablet Take 5 mg by mouth 3 times daily      Fluticasone-Umeclidin-Vilant (TRELEGY ELLIPTA) 200-62.5-25 MCG/INH AEPB Inhale 1 puff into the lungs daily      albuterol (PROVENTIL) (2.5 MG/3ML) 0.083% nebulizer solution Take 2.5 mg by nebulization every 4 hours as needed for Wheezing      sertraline (ZOLOFT) 50 MG tablet Take 50 mg by mouth daily      predniSONE (DELTASONE) 5 MG tablet Take 5 mg by mouth daily      guaiFENesin (MUCINEX) 600 MG extended release tablet Take 600 mg by mouth 2 times daily      pantoprazole (PROTONIX) 40 MG tablet Take 40 mg by mouth daily      Roflumilast (DALIRESP) 500 MCG tablet Take 500 mcg by mouth daily      OXYGEN Inhale 2 L into the lungs      ondansetron (ZOFRAN-ODT) 4 MG disintegrating tablet Take 1 tablet by mouth every 8 hours as needed for Nausea or Vomiting      furosemide (LASIX) 20 MG tablet Take 1 tablet by mouth See Admin Instructions Tuesday, friday      magnesium oxide (MAG-OX) 400 (241.3 Mg) MG TABS tablet Take 1 tablet by mouth daily      fluticasone (FLONASE) 50 MCG/ACT nasal spray 1 spray by Each Nostril route daily as needed for Rhinitis  Qty: 16 g, Refills: 0      ferrous sulfate (IRON 325) 325 (65 Fe) MG tablet Take 325 mg by mouth daily (with breakfast)      acetaminophen (TYLENOL) 500 MG tablet Take 500 mg by mouth every 6 hours as needed for Pain      metoprolol tartrate (LOPRESSOR) 25 MG tablet Take 1 tablet by mouth 2 times daily  Qty: 60 tablet, Refills: 3      docusate sodium (COLACE, DULCOLAX) 100 MG CAPS Take 100 mg by mouth 2 times daily  Qty: 30 capsule, Refills: 0      clopidogrel (PLAVIX) 75 MG tablet Take 1 tablet by mouth daily  Qty: 30 tablet, Refills: 3             ALLERGIES     Latex and Codeine    FAMILY HISTORY       Family History   Problem Relation Age of Onset    Cancer Mother     Heart Disease Father     Stroke Father     Heart Disease Sister     Stroke Sister           SOCIAL HISTORY       Social History     Socioeconomic History    Marital status:      Spouse name: None    Number of children: 4    Years of education: None    Highest education level: None   Tobacco Use    Smoking status: Former     Packs/day: 0.50     Years: 50.00     Pack years: 25.00     Types: Cigarettes     Quit date: 9/19/2019     Years since quitting: 3.2    Smokeless tobacco: Never    Tobacco comments:     quit 2018   Substance and Sexual Activity    Alcohol use: No    Drug use: No    Sexual activity: Not Currently     Comment: tabacco- Pk Day       SCREENINGS    Jennifer Coma Scale  Eye Opening: Spontaneous  Best Verbal Response: Oriented  Best Motor Response: Obeys commands  [place] Coma Scale Score: 15           PHYSICAL EXAM    (up to 7 for level 4, 8 or more for level 5)     ED Triage Vitals [01/04/23 1138]   BP Temp Temp Source Heart Rate Resp SpO2 Height Weight   (!) 146/76 98 °F (36.7 °C) Oral 63 18 99 % -- 127 lb (57.6 kg)       Physical Exam  Vitals and nursing note reviewed. Constitutional:       General: She is awake. She is not in acute distress. Appearance: Normal appearance. She is well-developed and well-groomed. She is obese. She is not ill-appearing, toxic-appearing or diaphoretic. HENT:      Head: Normocephalic and atraumatic. Right Ear: External ear normal.      Left Ear: External ear normal.      Nose: Nose normal.      Mouth/Throat:      Mouth: Mucous membranes are dry. Eyes:      General:         Right eye: No discharge. Left eye: No discharge. Conjunctiva/sclera: Conjunctivae normal.   Neck:      Trachea: No tracheal deviation.    Cardiovascular:      Rate and Rhythm: Normal rate and regular rhythm. Pulses: Normal pulses. Radial pulses are 2+ on the right side and 2+ on the left side. Pulmonary:      Effort: Pulmonary effort is normal. Tachypnea present. No bradypnea, accessory muscle usage or respiratory distress. Breath sounds: No stridor. Examination of the right-upper field reveals wheezing and rhonchi. Examination of the left-upper field reveals wheezing and rhonchi. Examination of the right-middle field reveals wheezing and rhonchi. Examination of the left-middle field reveals wheezing and rhonchi. Examination of the right-lower field reveals decreased breath sounds, wheezing and rhonchi. Examination of the left-lower field reveals decreased breath sounds, wheezing and rhonchi. Decreased breath sounds, wheezing and rhonchi present. No rales. Abdominal:      General: Bowel sounds are normal. There is no distension. Palpations: Abdomen is soft. Abdomen is not rigid. Tenderness: There is no abdominal tenderness. There is no guarding or rebound. Negative signs include Ferrari's sign and McBurney's sign. Musculoskeletal:         General: No tenderness or deformity. Normal range of motion. Cervical back: Full passive range of motion without pain, normal range of motion and neck supple. No edema, erythema, rigidity or tenderness. Normal range of motion. Right lower leg: No tenderness. 1+ Edema present. Left lower leg: No tenderness. 1+ Edema present. Skin:     General: Skin is warm and dry. Coloration: Skin is not jaundiced or pale. Findings: No erythema or rash. Neurological:      General: No focal deficit present. Mental Status: She is alert and oriented to person, place, and time. Mental status is at baseline. GCS: GCS eye subscore is 4. GCS verbal subscore is 5. GCS motor subscore is 6. Motor: No weakness, tremor, atrophy, abnormal muscle tone or seizure activity.    Psychiatric:         Mood and Affect: Mood normal. Mood is not anxious. Behavior: Behavior normal. Behavior is not agitated. Behavior is cooperative. DIAGNOSTIC RESULTS   :    Labs Reviewed   CBC WITH AUTO DIFFERENTIAL - Abnormal; Notable for the following components:       Result Value    Lymphocytes Absolute 0.7 (*)     All other components within normal limits   COMPREHENSIVE METABOLIC PANEL - Abnormal; Notable for the following components:    BUN 25 (*)     Creatinine 1.5 (*)     Est, Glom Filt Rate 35 (*)     ALT 7 (*)     All other components within normal limits   BRAIN NATRIURETIC PEPTIDE - Abnormal; Notable for the following components:    Pro-BNP 1,728 (*)     All other components within normal limits   MAGNESIUM - Abnormal; Notable for the following components:    Magnesium 1.70 (*)     All other components within normal limits   TROPONIN - Abnormal; Notable for the following components:    Troponin 0.05 (*)     All other components within normal limits   URINALYSIS WITH MICROSCOPIC - Abnormal; Notable for the following components:    Blood, Urine SMALL (*)     All other components within normal limits   BLOOD GAS, VENOUS - Abnormal; Notable for the following components:    pH, Zach 7.313 (*)     pCO2, Zach 62.7 (*)     HCO3, Venous 31.1 (*)     Base Excess, Zach 3.1 (*)     Carboxyhemoglobin 1.9 (*)     All other components within normal limits   COVID-19 & INFLUENZA COMBO   CULTURE, BLOOD 1   CULTURE, BLOOD 2   LIPASE   LACTIC ACID   PROCALCITONIN       All other labs were within normal range or not returned asof this dictation. EKG: All EKG's are interpreted by the Emergency Department Physician who either signs or Co-signs this chart in the absence of a cardiologist.    The Ekg interpreted by me shows  Sinus rhythm with premature atrial complexes noted  with a rate of 84  Axis is   Left axis deviation  QTc is  normal  Intervals and Durations are unremarkable.       ST Segments: nonspecific changes  Significant change from prior EKG dated - 4/5/22  No STEMI, sinus rhythm today (was afib on old EKG)         RADIOLOGY:   Non-plain film images such as CT, Ultrasound and MRI are read by the radiologist. Minor Man images are visualized and preliminarily interpreted by the  ED Provider with the belowfindings:        Interpretation per the Radiologist below, if available at the time of this note:    CT CHEST WO CONTRAST   Final Result   Emphysema. There be early fibrotic changes. This is not typical for UIP. XR CHEST PORTABLE   Final Result   No acute cardiopulmonary findings               PROCEDURES   Unless otherwise noted below, none     Procedures    CRITICAL CARE TIME   Critical Care Time:    I personally saw the patient and independently provided 20 minutes of non-concurrent critical care out of the total shared critical care time provided. Includes repeat examinations, speaking with consultants, lab interpretation, charting, treating for acute on chronic respiratory failure requiring IV steroids and along with breathing treatment  Excludes separate billable procedures. Patient at risk for serious decompensation if not treated for this life-threatening condition. CONSULTS: Paged Dr. Viktoria Castillo for admission.   IP CONSULT TO HOSPITALIST    EMERGENCY DEPARTMENT COURSE and DIFFERENTIAL DIAGNOSIS/MDM:   Vitals:    Vitals:    01/04/23 1322 01/04/23 1421 01/04/23 1438 01/04/23 1556   BP:    (!) 143/98   Pulse:   80 86   Resp:  25 18 22   Temp:       TempSrc:       SpO2: 97% 99% 96% 98%   Weight:           Patient was given the following medications:  Medications   ipratropium-albuterol (DUONEB) nebulizer solution 1 ampule (1 ampule Inhalation Given 1/4/23 1321)   0.9 % sodium chloride bolus (500 mLs IntraVENous New Bag 1/4/23 1339)   dexamethasone (DECADRON) injection 8 mg (8 mg IntraVENous Given 1/4/23 1553)     Patient was evaluated due to worsening shortness of breath along with generalized fatigue and weakness over the past couple of days associated with chest congestion and sinus congestion. She did have significant wheezing and rhonchi on exam and therefore with history of COPD, I did order a breathing treatment. She was hypotensive when I evaluated her and therefore I did also give some IV fluids. Abdominal exam was benign and I do not suspect bowel obstruction at this time. Chest x-ray did not show any acute findings per radiologist.  Based on obvious shortness of breath, I did order a CT of the chest for further evaluation. She did receive IV steroids for this with IV Decadron. Blood pressure did improve with IV fluids. She had a mild acute kidney injury which will need to be evaluated and trended. Orthostatics were reassuring but when they tried to move the patient from the bed to the bedside commode, her oxygen did drop to 80% and she was severely out of breath per nursing staff and therefore at this time she will need further evaluation in the hospital for COPD exacerbation. CT of the chest did not show any concern for pneumonia. She was stable for the floor with telemetry and agreed with this plan. Radiologist did not mention any concern for pericardial effusion, pneumothorax, aortic dissection, or any other concerns on the CT scan based on impression. Troponin was mildly elevated but it is chronically elevated and this can be trended although I do not suspect acute coronary syndrome at this time. She denies any pain with breathing and story not suggestive of pulmonary embolism. She is on Plavix but denies being on Eliquis anymore and I did review her medication list that her caregiver brought with her. Her caregiver did state that she has been much more out of breath over the past couple of days which was concerning to her and the patient confirmed this. I was the primary provider for the patient. The patient tolerated their visit well.    The patient and / or the family were informed of the results of any tests, a time was given to answer questions. FINAL IMPRESSION      1. COPD exacerbation ([address]    2. Acute on chronic respiratory failure with hypoxia (HCC)    3. ZENDEJAS (dyspnea on exertion)    4. Acute kidney injury ([address]    5. Transient hypotension          DISPOSITION/PLAN   DISPOSITION Decision To Admit 01/04/2023 03:03:21 PM-Decision to admit      PATIENT REFERRED TO:  No follow-up provider specified.     DISCHARGEMEDICATIONS:  Current Discharge Medication List          DISCONTINUED MEDICATIONS:  Current Discharge Medication List                 (Please note that portions of this note were completed with a voicerecognition program.  Efforts were made to edit the dictations but occasionally words are mis-transcribed.)    Jordon Mar MD (electronically signed)    Sandra Ang MD  01/04/23 4130

## 2023-01-04 NOTE — ED NOTES
Called lab regarding need for second set of cultures. Phlebot will be in ED to obtain second set of cultures.       Mikala Collins RN  01/04/23 1600

## 2023-01-04 NOTE — ED NOTES
External catheter placed on patient. Patient educated on need to monitor strict I/O d/t lasix administration.       Celine Conn RN  01/04/23 8424

## 2023-01-05 LAB
A/G RATIO: 0.9 (ref 1.1–2.2)
ALBUMIN SERPL-MCNC: 3.8 G/DL (ref 3.4–5)
ALP BLD-CCNC: 71 U/L (ref 40–129)
ALT SERPL-CCNC: 7 U/L (ref 10–40)
ANION GAP SERPL CALCULATED.3IONS-SCNC: 13 MMOL/L (ref 3–16)
AST SERPL-CCNC: 13 U/L (ref 15–37)
BASOPHILS ABSOLUTE: 0 K/UL (ref 0–0.2)
BASOPHILS RELATIVE PERCENT: 0.1 %
BILIRUB SERPL-MCNC: 0.3 MG/DL (ref 0–1)
BUN BLDV-MCNC: 37 MG/DL (ref 7–20)
CALCIUM SERPL-MCNC: 10.2 MG/DL (ref 8.3–10.6)
CHLORIDE BLD-SCNC: 101 MMOL/L (ref 99–110)
CO2: 24 MMOL/L (ref 21–32)
CREAT SERPL-MCNC: 1.6 MG/DL (ref 0.6–1.2)
EOSINOPHILS ABSOLUTE: 0 K/UL (ref 0–0.6)
EOSINOPHILS RELATIVE PERCENT: 0 %
ESTIMATED AVERAGE GLUCOSE: 122.6 MG/DL
GFR SERPL CREATININE-BSD FRML MDRD: 32 ML/MIN/{1.73_M2}
GLUCOSE BLD-MCNC: 124 MG/DL (ref 70–99)
GLUCOSE BLD-MCNC: 143 MG/DL (ref 70–99)
GLUCOSE BLD-MCNC: 152 MG/DL (ref 70–99)
GLUCOSE BLD-MCNC: 161 MG/DL (ref 70–99)
GLUCOSE BLD-MCNC: 99 MG/DL (ref 70–99)
HBA1C MFR BLD: 5.9 %
HCT VFR BLD CALC: 45.3 % (ref 36–48)
HEMOGLOBIN: 14.1 G/DL (ref 12–16)
LACTIC ACID: 4.2 MMOL/L (ref 0.4–2)
LYMPHOCYTES ABSOLUTE: 0.8 K/UL (ref 1–5.1)
LYMPHOCYTES RELATIVE PERCENT: 13.1 %
MCH RBC QN AUTO: 30.3 PG (ref 26–34)
MCHC RBC AUTO-ENTMCNC: 31.1 G/DL (ref 31–36)
MCV RBC AUTO: 97.4 FL (ref 80–100)
MONOCYTES ABSOLUTE: 0.1 K/UL (ref 0–1.3)
MONOCYTES RELATIVE PERCENT: 0.9 %
NEUTROPHILS ABSOLUTE: 5.2 K/UL (ref 1.7–7.7)
NEUTROPHILS RELATIVE PERCENT: 85.9 %
PDW BLD-RTO: 14.1 % (ref 12.4–15.4)
PERFORMED ON: ABNORMAL
PERFORMED ON: NORMAL
PLATELET # BLD: 229 K/UL (ref 135–450)
PMV BLD AUTO: 7.8 FL (ref 5–10.5)
POTASSIUM REFLEX MAGNESIUM: 5.5 MMOL/L (ref 3.5–5.1)
PROCALCITONIN: 0.13 NG/ML (ref 0–0.15)
RBC # BLD: 4.65 M/UL (ref 4–5.2)
SODIUM BLD-SCNC: 138 MMOL/L (ref 136–145)
TOTAL PROTEIN: 8.2 G/DL (ref 6.4–8.2)
TROPONIN: 0.02 NG/ML
TROPONIN: 0.03 NG/ML
TROPONIN: 0.04 NG/ML
WBC # BLD: 6.1 K/UL (ref 4–11)

## 2023-01-05 PROCEDURE — 83605 ASSAY OF LACTIC ACID: CPT

## 2023-01-05 PROCEDURE — 97530 THERAPEUTIC ACTIVITIES: CPT

## 2023-01-05 PROCEDURE — 94660 CPAP INITIATION&MGMT: CPT

## 2023-01-05 PROCEDURE — 6360000002 HC RX W HCPCS: Performed by: HOSPITALIST

## 2023-01-05 PROCEDURE — 94640 AIRWAY INHALATION TREATMENT: CPT

## 2023-01-05 PROCEDURE — 94761 N-INVAS EAR/PLS OXIMETRY MLT: CPT

## 2023-01-05 PROCEDURE — 6370000000 HC RX 637 (ALT 250 FOR IP): Performed by: HOSPITALIST

## 2023-01-05 PROCEDURE — 97165 OT EVAL LOW COMPLEX 30 MIN: CPT

## 2023-01-05 PROCEDURE — 97116 GAIT TRAINING THERAPY: CPT

## 2023-01-05 PROCEDURE — 97162 PT EVAL MOD COMPLEX 30 MIN: CPT

## 2023-01-05 PROCEDURE — 2700000000 HC OXYGEN THERAPY PER DAY

## 2023-01-05 PROCEDURE — 80053 COMPREHEN METABOLIC PANEL: CPT

## 2023-01-05 PROCEDURE — 36415 COLL VENOUS BLD VENIPUNCTURE: CPT

## 2023-01-05 PROCEDURE — 84484 ASSAY OF TROPONIN QUANT: CPT

## 2023-01-05 PROCEDURE — 85025 COMPLETE CBC W/AUTO DIFF WBC: CPT

## 2023-01-05 PROCEDURE — 97535 SELF CARE MNGMENT TRAINING: CPT

## 2023-01-05 PROCEDURE — 2060000000 HC ICU INTERMEDIATE R&B

## 2023-01-05 PROCEDURE — 2580000003 HC RX 258: Performed by: HOSPITALIST

## 2023-01-05 RX ORDER — FUROSEMIDE 10 MG/ML
20 INJECTION INTRAMUSCULAR; INTRAVENOUS DAILY
Status: COMPLETED | OUTPATIENT
Start: 2023-01-05 | End: 2023-01-07

## 2023-01-05 RX ORDER — ACETAMINOPHEN 650 MG/1
650 SUPPOSITORY RECTAL EVERY 6 HOURS PRN
Status: DISCONTINUED | OUTPATIENT
Start: 2023-01-05 | End: 2023-01-15 | Stop reason: HOSPADM

## 2023-01-05 RX ORDER — DOXYCYCLINE HYCLATE 100 MG
100 TABLET ORAL EVERY 12 HOURS
Status: DISCONTINUED | OUTPATIENT
Start: 2023-01-05 | End: 2023-01-10 | Stop reason: ALTCHOICE

## 2023-01-05 RX ORDER — METHYLPREDNISOLONE SODIUM SUCCINATE 40 MG/ML
40 INJECTION, POWDER, LYOPHILIZED, FOR SOLUTION INTRAMUSCULAR; INTRAVENOUS EVERY 12 HOURS
Status: COMPLETED | OUTPATIENT
Start: 2023-01-05 | End: 2023-01-06

## 2023-01-05 RX ORDER — ONDANSETRON 4 MG/1
4 TABLET, ORALLY DISINTEGRATING ORAL EVERY 8 HOURS PRN
Status: DISCONTINUED | OUTPATIENT
Start: 2023-01-05 | End: 2023-01-15 | Stop reason: HOSPADM

## 2023-01-05 RX ORDER — LANOLIN ALCOHOL/MO/W.PET/CERES
400 CREAM (GRAM) TOPICAL DAILY
Status: DISCONTINUED | OUTPATIENT
Start: 2023-01-05 | End: 2023-01-15 | Stop reason: HOSPADM

## 2023-01-05 RX ORDER — ATORVASTATIN CALCIUM 40 MG/1
40 TABLET, FILM COATED ORAL NIGHTLY
Status: DISCONTINUED | OUTPATIENT
Start: 2023-01-05 | End: 2023-01-15 | Stop reason: HOSPADM

## 2023-01-05 RX ORDER — FERROUS SULFATE 325(65) MG
325 TABLET ORAL
Status: DISCONTINUED | OUTPATIENT
Start: 2023-01-05 | End: 2023-01-15 | Stop reason: HOSPADM

## 2023-01-05 RX ORDER — GUAIFENESIN 600 MG/1
600 TABLET, EXTENDED RELEASE ORAL 2 TIMES DAILY
Status: DISCONTINUED | OUTPATIENT
Start: 2023-01-05 | End: 2023-01-15 | Stop reason: HOSPADM

## 2023-01-05 RX ORDER — SENNA PLUS 8.6 MG/1
1 TABLET ORAL DAILY PRN
Status: DISCONTINUED | OUTPATIENT
Start: 2023-01-05 | End: 2023-01-15 | Stop reason: HOSPADM

## 2023-01-05 RX ORDER — CLOPIDOGREL BISULFATE 75 MG/1
75 TABLET ORAL DAILY
Status: DISCONTINUED | OUTPATIENT
Start: 2023-01-05 | End: 2023-01-15 | Stop reason: HOSPADM

## 2023-01-05 RX ORDER — PANTOPRAZOLE SODIUM 40 MG/1
40 TABLET, DELAYED RELEASE ORAL DAILY
Status: DISCONTINUED | OUTPATIENT
Start: 2023-01-05 | End: 2023-01-07

## 2023-01-05 RX ORDER — ACETAMINOPHEN 325 MG/1
650 TABLET ORAL EVERY 6 HOURS PRN
Status: DISCONTINUED | OUTPATIENT
Start: 2023-01-05 | End: 2023-01-15 | Stop reason: HOSPADM

## 2023-01-05 RX ORDER — SODIUM CHLORIDE 0.9 % (FLUSH) 0.9 %
10 SYRINGE (ML) INJECTION EVERY 12 HOURS SCHEDULED
Status: DISCONTINUED | OUTPATIENT
Start: 2023-01-05 | End: 2023-01-15 | Stop reason: HOSPADM

## 2023-01-05 RX ORDER — SODIUM CHLORIDE 0.9 % (FLUSH) 0.9 %
10 SYRINGE (ML) INJECTION PRN
Status: DISCONTINUED | OUTPATIENT
Start: 2023-01-05 | End: 2023-01-15 | Stop reason: HOSPADM

## 2023-01-05 RX ORDER — FLUTICASONE PROPIONATE 50 MCG
1 SPRAY, SUSPENSION (ML) NASAL DAILY PRN
Status: DISCONTINUED | OUTPATIENT
Start: 2023-01-05 | End: 2023-01-15 | Stop reason: HOSPADM

## 2023-01-05 RX ORDER — ONDANSETRON 2 MG/ML
4 INJECTION INTRAMUSCULAR; INTRAVENOUS EVERY 6 HOURS PRN
Status: DISCONTINUED | OUTPATIENT
Start: 2023-01-05 | End: 2023-01-15 | Stop reason: HOSPADM

## 2023-01-05 RX ORDER — ROFLUMILAST 500 UG/1
500 TABLET ORAL DAILY
Status: DISCONTINUED | OUTPATIENT
Start: 2023-01-05 | End: 2023-01-15 | Stop reason: HOSPADM

## 2023-01-05 RX ORDER — ACETAMINOPHEN 500 MG
500 TABLET ORAL EVERY 6 HOURS PRN
Status: DISCONTINUED | OUTPATIENT
Start: 2023-01-05 | End: 2023-01-05 | Stop reason: SDUPTHER

## 2023-01-05 RX ORDER — BUSPIRONE HYDROCHLORIDE 5 MG/1
5 TABLET ORAL 3 TIMES DAILY
Status: DISCONTINUED | OUTPATIENT
Start: 2023-01-05 | End: 2023-01-15 | Stop reason: HOSPADM

## 2023-01-05 RX ORDER — DOCUSATE SODIUM 100 MG/1
100 CAPSULE, LIQUID FILLED ORAL 2 TIMES DAILY PRN
Status: DISCONTINUED | OUTPATIENT
Start: 2023-01-05 | End: 2023-01-15 | Stop reason: HOSPADM

## 2023-01-05 RX ORDER — POLYETHYLENE GLYCOL 3350 17 G/17G
17 POWDER, FOR SOLUTION ORAL DAILY PRN
Status: DISCONTINUED | OUTPATIENT
Start: 2023-01-05 | End: 2023-01-15 | Stop reason: HOSPADM

## 2023-01-05 RX ORDER — ALBUTEROL SULFATE 2.5 MG/3ML
2.5 SOLUTION RESPIRATORY (INHALATION)
Status: DISCONTINUED | OUTPATIENT
Start: 2023-01-05 | End: 2023-01-15 | Stop reason: HOSPADM

## 2023-01-05 RX ORDER — PREDNISONE 20 MG/1
40 TABLET ORAL DAILY
Status: DISCONTINUED | OUTPATIENT
Start: 2023-01-07 | End: 2023-01-08

## 2023-01-05 RX ORDER — SODIUM CHLORIDE 9 MG/ML
INJECTION, SOLUTION INTRAVENOUS PRN
Status: DISCONTINUED | OUTPATIENT
Start: 2023-01-05 | End: 2023-01-15 | Stop reason: HOSPADM

## 2023-01-05 RX ORDER — ALBUTEROL SULFATE 2.5 MG/3ML
2.5 SOLUTION RESPIRATORY (INHALATION)
Status: DISCONTINUED | OUTPATIENT
Start: 2023-01-05 | End: 2023-01-05

## 2023-01-05 RX ADMIN — ROFLUMILAST 500 MCG: 500 TABLET ORAL at 09:44

## 2023-01-05 RX ADMIN — METOPROLOL TARTRATE 25 MG: 25 TABLET, FILM COATED ORAL at 00:51

## 2023-01-05 RX ADMIN — APIXABAN 2.5 MG: 2.5 TABLET, FILM COATED ORAL at 00:51

## 2023-01-05 RX ADMIN — METOPROLOL TARTRATE 25 MG: 25 TABLET, FILM COATED ORAL at 21:04

## 2023-01-05 RX ADMIN — ALBUTEROL SULFATE 2.5 MG: 2.5 SOLUTION RESPIRATORY (INHALATION) at 15:47

## 2023-01-05 RX ADMIN — DOXYCYCLINE HYCLATE 100 MG: 100 TABLET, COATED ORAL at 14:13

## 2023-01-05 RX ADMIN — ATORVASTATIN CALCIUM 40 MG: 40 TABLET, FILM COATED ORAL at 21:04

## 2023-01-05 RX ADMIN — GUAIFENESIN 600 MG: 600 TABLET, EXTENDED RELEASE ORAL at 21:04

## 2023-01-05 RX ADMIN — METHYLPREDNISOLONE SODIUM SUCCINATE 40 MG: 40 INJECTION, POWDER, FOR SOLUTION INTRAMUSCULAR; INTRAVENOUS at 00:58

## 2023-01-05 RX ADMIN — ALBUTEROL SULFATE 2.5 MG: 2.5 SOLUTION RESPIRATORY (INHALATION) at 12:32

## 2023-01-05 RX ADMIN — ALBUTEROL SULFATE 2.5 MG: 2.5 SOLUTION RESPIRATORY (INHALATION) at 21:37

## 2023-01-05 RX ADMIN — Medication 2 PUFF: at 21:37

## 2023-01-05 RX ADMIN — GUAIFENESIN 600 MG: 600 TABLET, EXTENDED RELEASE ORAL at 08:07

## 2023-01-05 RX ADMIN — ALBUTEROL SULFATE 2.5 MG: 2.5 SOLUTION RESPIRATORY (INHALATION) at 08:47

## 2023-01-05 RX ADMIN — DOXYCYCLINE HYCLATE 100 MG: 100 TABLET, COATED ORAL at 00:51

## 2023-01-05 RX ADMIN — SERTRALINE HYDROCHLORIDE 50 MG: 50 TABLET ORAL at 08:08

## 2023-01-05 RX ADMIN — BUSPIRONE HYDROCHLORIDE 5 MG: 5 TABLET ORAL at 08:08

## 2023-01-05 RX ADMIN — SODIUM CHLORIDE, PRESERVATIVE FREE 10 ML: 5 INJECTION INTRAVENOUS at 08:08

## 2023-01-05 RX ADMIN — GUAIFENESIN 600 MG: 600 TABLET, EXTENDED RELEASE ORAL at 00:51

## 2023-01-05 RX ADMIN — ONDANSETRON 4 MG: 2 INJECTION INTRAMUSCULAR; INTRAVENOUS at 05:29

## 2023-01-05 RX ADMIN — METHYLPREDNISOLONE SODIUM SUCCINATE 40 MG: 40 INJECTION, POWDER, FOR SOLUTION INTRAMUSCULAR; INTRAVENOUS at 14:13

## 2023-01-05 RX ADMIN — Medication 2 PUFF: at 08:47

## 2023-01-05 RX ADMIN — PANTOPRAZOLE SODIUM 40 MG: 40 TABLET, DELAYED RELEASE ORAL at 08:07

## 2023-01-05 RX ADMIN — ACETAMINOPHEN 325MG 650 MG: 325 TABLET ORAL at 08:08

## 2023-01-05 RX ADMIN — SODIUM CHLORIDE, PRESERVATIVE FREE 10 ML: 5 INJECTION INTRAVENOUS at 21:04

## 2023-01-05 RX ADMIN — Medication 400 MG: at 08:08

## 2023-01-05 RX ADMIN — TIOTROPIUM BROMIDE INHALATION SPRAY 2 PUFF: 3.12 SPRAY, METERED RESPIRATORY (INHALATION) at 08:47

## 2023-01-05 RX ADMIN — BUSPIRONE HYDROCHLORIDE 5 MG: 5 TABLET ORAL at 14:13

## 2023-01-05 RX ADMIN — ATORVASTATIN CALCIUM 40 MG: 40 TABLET, FILM COATED ORAL at 00:51

## 2023-01-05 RX ADMIN — BUSPIRONE HYDROCHLORIDE 5 MG: 5 TABLET ORAL at 21:04

## 2023-01-05 RX ADMIN — APIXABAN 2.5 MG: 2.5 TABLET, FILM COATED ORAL at 21:04

## 2023-01-05 RX ADMIN — CLOPIDOGREL BISULFATE 75 MG: 75 TABLET ORAL at 08:08

## 2023-01-05 RX ADMIN — FUROSEMIDE 20 MG: 10 INJECTION, SOLUTION INTRAMUSCULAR; INTRAVENOUS at 08:07

## 2023-01-05 RX ADMIN — APIXABAN 2.5 MG: 2.5 TABLET, FILM COATED ORAL at 08:08

## 2023-01-05 RX ADMIN — METOPROLOL TARTRATE 25 MG: 25 TABLET, FILM COATED ORAL at 08:07

## 2023-01-05 RX ADMIN — FERROUS SULFATE TAB 325 MG (65 MG ELEMENTAL FE) 325 MG: 325 (65 FE) TAB at 08:08

## 2023-01-05 ASSESSMENT — PAIN SCALES - GENERAL
PAINLEVEL_OUTOF10: 8
PAINLEVEL_OUTOF10: 9
PAINLEVEL_OUTOF10: 8

## 2023-01-05 ASSESSMENT — PAIN DESCRIPTION - LOCATION
LOCATION: HEAD

## 2023-01-05 ASSESSMENT — PAIN DESCRIPTION - DESCRIPTORS: DESCRIPTORS: ACHING

## 2023-01-05 NOTE — H&P
HOSPITALISTS HISTORY AND PHYSICAL    1/4/2023 7:29 PM    Patient Information:  Juan Hess is a 81 y.o. female [id number]  PCP:  Katie Gómez MD (Tel: [phone] )    Chief complaint:    Chief Complaint   Patient presents with    Shortness of Breath     Sinus infection x 3 days, woke up today feeling feverish, hx of copd, took buspar 45 min ago        History of Present Illness:  Servando Billingsley is a 81 y.o. female who presented to the ED to be evaluated for acute on chronic dyspnea, acutely worsening for 3 days PTA. Patient reports that she was diagnosed with a sinus infection earlier in the week, and woke up today feeling feverish and lethargic. Patient has an underlying history of oxygen dependent COPD, but reports that her home BiPAP machine has not been functioning properly since recent setting adjustment. Upon further questioning patient reports that she is not taking her Lasix QOD as scheduled; she takes it 1-2 times per week PRN. Patient endorses increased BLE edema as well as orthopnea. Patient no longer smokes tobacco.    Upon arrival to the ED EKG was obtained revealing NSR with PACs and LAE with LAD. STAT chest CT demonstrates emphysema with fibrosis. Influenza A/B and SARS-CoV-2 testing performed and noted to be negative. Notable labs include: Troponin 0.05, BNP 1728, hypomagnesemia 1.70, NADJA with BUNs/CR 25/1.5. Patient received a one-time dosage of IV Solu-Medrol as well as DuoNeb therapy prior to request for admission. History obtained from patient and review of [facility] chart    Old medical records show patient's most recent echo was obtained on 8/30/2021 with the following results:     Summary   -- Normal left ventricular systolic function with ejection fraction of   55-60%. No regional wall motion abnormalites are seen.  Left ventricular   diastolic filling pressure is elevated. Compared to previous study from 2019 no changes noted in left   ventricular function. Mild mitral regurgitation    REVIEW OF SYSTEMS:   Constitutional: Positive for fever,chills, and generalized weakness  ENT: Negative fo at r headache, rhinorrhea, and sore throat; positive sinusitis  Respiratory: Acute on chronic dyspnea with history of O2 dependent COPD; reports BiPAP not working appropriately  Cardiovascular: Intermittent chest pain with palpitation; increased BLE peripheral edema with orthopnea or PND  Gastrointestinal: Negative for N/V/D and abdominal pain; no hematemesis, hematochezia, or melena; no anorexia  Genitourinary: Negative for dysuria, frequency, retention; no incontinence  Hematologic/Lymphatic: Negative for bleeding tendency/excessive bruising  Musculoskeletal: Negative for myalgias and arthalgias; able to ambulate without difficulty  Neurologic: Negative for LOC, seizure activity, paresthesias, dysarthria, vertigo, and gait disturbance  Skin: Negative for itching,rash, decubitus  Psychiatric: Negative for depression,anxiety, and agitation; no hallucinations; denies SI/HI  Endocrine: Negative for polyuria/polydipsia/polyphagia; no heat/cold intolerance    Past Medical History:   has a past medical history of NADJA (acute kidney injury) ([facility] [address] Asthma, CAD (coronary artery disease), CHF (congestive heart failure) ([facility] [address] Chronic anxiety, COPD (chronic obstructive pulmonary disease) ([facility] [address] GERD (gastroesophageal reflux disease), History of blood transfusion, Hyperlipidemia, Hypertension, MRSA (methicillin resistant staph aureus) culture positive, OA (osteoarthritis), and Thyroid disease. Past Surgical History:   has a past surgical history that includes  section; Mastectomy, partial (Left); bronchoscopy (2019); Cardiac catheterization (2019); Coronary artery bypass graft (N/A, 2019); Colonoscopy (N/A, 2020);  Sigmoidoscopy (2020); sigmoidoscopy (N/A, 2/27/2020); IR MIDLINE CATH (9/20/2021); and Colonoscopy (N/A, 10/22/2021). Medications:  No current facility-administered medications on file prior to encounter.      Current Outpatient Medications on File Prior to Encounter   Medication Sig Dispense Refill    apixaban (ELIQUIS) 2.5 MG TABS tablet Take 1 tablet by mouth 2 times daily 60 tablet 2    atorvastatin (LIPITOR) 40 MG tablet Take 1 tablet by mouth nightly 30 tablet 3    busPIRone (BUSPAR) 5 MG tablet Take 5 mg by mouth 3 times daily      Fluticasone-Umeclidin-Vilant (TRELEGY ELLIPTA) 200-62.5-25 MCG/INH AEPB Inhale 1 puff into the lungs daily      albuterol (PROVENTIL) (2.5 MG/3ML) 0.083% nebulizer solution Take 2.5 mg by nebulization every 4 hours as needed for Wheezing      sertraline (ZOLOFT) 50 MG tablet Take 50 mg by mouth daily      predniSONE (DELTASONE) 5 MG tablet Take 5 mg by mouth daily      guaiFENesin (MUCINEX) 600 MG extended release tablet Take 600 mg by mouth 2 times daily      pantoprazole (PROTONIX) 40 MG tablet Take 40 mg by mouth daily      Roflumilast (DALIRESP) 500 MCG tablet Take 500 mcg by mouth daily      OXYGEN Inhale 2 L into the lungs      ondansetron (ZOFRAN-ODT) 4 MG disintegrating tablet Take 1 tablet by mouth every 8 hours as needed for Nausea or Vomiting      furosemide (LASIX) 20 MG tablet Take 1 tablet by mouth See Admin Instructions Tuesday, friday      magnesium oxide (MAG-OX) 400 (241.3 Mg) MG TABS tablet Take 1 tablet by mouth daily      fluticasone (FLONASE) 50 MCG/ACT nasal spray 1 spray by Each Nostril route daily as needed for Rhinitis 16 g 0    ferrous sulfate (IRON 325) 325 (65 Fe) MG tablet Take 325 mg by mouth daily (with breakfast)      acetaminophen (TYLENOL) 500 MG tablet Take 500 mg by mouth every 6 hours as needed for Pain      metoprolol tartrate (LOPRESSOR) 25 MG tablet Take 1 tablet by mouth 2 times daily 60 tablet 3    docusate sodium (COLACE, DULCOLAX) 100 MG CAPS Take 100 mg by mouth 2 times daily (Patient taking differently: Take 100 mg by mouth 2 times daily as needed ) 30 capsule 0    clopidogrel (PLAVIX) 75 MG tablet Take 1 tablet by mouth daily 30 tablet 3       Allergies: Allergies   Allergen Reactions    Latex Other (See Comments)     Burning      Codeine Other (See Comments)     \"hallucinations\"        Social History:   reports that she quit smoking about 3 years ago. Her smoking use included cigarettes. She has a 25.00 pack-year smoking history. She has never used smokeless tobacco. She reports that she does not drink alcohol and does not use drugs. Family History:  family history includes Cancer in her mother; Heart Disease in her father and sister; Stroke in her father and sister. Physical Exam:  BP (!) 154/102   Pulse 72   Temp 98 °F (36.7 °C) (Oral)   Resp 20   Wt 127 lb (57.6 kg)   SpO2 99%   BMI 21.80 kg/m²     General appearance: Pleasant adult female resting in bed with caregiver by her side  Eyes: Sclera clear without conjunctival injection; PERRLA; EOMI  ENT: Mucous membranes moist without thrush; normal dentition  Neck: Supple without meningismus; no goiter; no carotid bruit bilaterally  Cardiovascular: Regular rhythm without ectopy; normal S1-S2 with no murmurs; trace BLE peripheral edema; no JVD  Respiratory:  Moderate tachypnea; diminished breath sounds throughout with bibasilar rales; end expiratory wheeze with prolonged BIENVENIDO ratio   gastrointestinal: Abdomen soft, non-tender, not distended; bowel sounds normal; no masses/organomegaly appreciated  Musculoskeletal: FROM spine and extremities x4; no gross deformity  Neurology: A&O x3; cranial nerves 2-12 grossly intact; motor 5/5  BUE/BLE; no seizure activity;   Psychiatry: Well-groomed with good eye contact; appropriate affect; no visual/auditory hallucination  Skin: RLE with massive ecchymosis from DAPT and Eliquis; submental region excoriated from   PV: 2/4 radial and dorsalis pedis bilaterally; brisk capillary refill    Labs:  CBC:   Lab Results   Component Value Date/Time    WBC 7.9 01/04/2023 01:35 PM    RBC 4.15 01/04/2023 01:35 PM    HGB 12.5 01/04/2023 01:35 PM    HCT 39.5 01/04/2023 01:35 PM    MCV 95.1 01/04/2023 01:35 PM    MCH 30.0 01/04/2023 01:35 PM    MCHC 31.6 01/04/2023 01:35 PM    RDW 13.8 01/04/2023 01:35 PM     01/04/2023 01:35 PM    MPV 7.9 01/04/2023 01:35 PM     BMP:    Lab Results   Component Value Date/Time     01/04/2023 01:35 PM    K 4.5 01/04/2023 01:35 PM    K 3.1 03/31/2022 05:30 AM     01/04/2023 01:35 PM    CO2 28 01/04/2023 01:35 PM    BUN 25 01/04/2023 01:35 PM    CREATININE 1.5 01/04/2023 01:35 PM    CALCIUM 9.5 01/04/2023 01:35 PM    GFRAA 58 04/09/2022 05:15 AM    GFRAA >60 05/08/2013 06:09 AM    LABGLOM 35 01/04/2023 01:35 PM    GLUCOSE 87 01/04/2023 01:35 PM     CT CHEST WO CONTRAST   Final Result   Emphysema. There be early fibrotic changes. This is not typical for UIP. XR CHEST PORTABLE   Final Result   No acute cardiopulmonary findings               EKG: Ventricular Rate 84 BPM QTc Calculation (Bazett) 434 ms   Atrial Rate 84 BPM P Axis 80 degrees   P-R Interval 158 ms R Axis -69 degrees   QRS Duration 80 ms T Axis 41 degrees   Q-T Interval 368 ms Diagnosis Sinus rhythm with Premature atrial complexesPossible Left atrial enlargementLeft axis deviation; nonspecific T wave abnormality        I visualized CXR images and EKG strips personally and agree with documented interpretation    Discussed case  with ED provider    Problem List:  Principal Problem:    Acute on chronic respiratory failure with hypoxia and hypercapnia (HCC)  Active Problems:    Acute respiratory failure with hypoxia and hypercapnia (HCC)    Acute decompensated heart failure (HCC)    COPD, severe (HCC)    S/P CABG x 2    Primary hypertension    Acute kidney injury ([address]    Tobacco abuse  Resolved Problems:    * No resolved hospital problems.  *        Consults:  IP CONSULT TO HOSPITALIST      Assessment/Plan:     Acute on chronic hypoxic/hypercapnic respiratory failure  -Admit to PCU with continuous pulse oximetry monitoring   -Patient hypercapnic with complaints of increased somnolence, therefore she was initiated on NIV PSV BiPAP per admitting provider  -Continue home maintenance MDIs/nebulizer treatment including Trelegy and Daliresp  -Encourage aggressive pulmonary toilet including incentive spirometry every 4H while awake  -Albuterol nebs scheduled Q4H while awake  -IV Solu-Medrol 125 mg x 1, followed by 40 mg Q12H; POC glucose checks with PRN insulin coverage  -Twice daily doxycycline 100 mg initiated due to reports of increased sputum production    Acute decompensated CHF  -Patient admitted to telemetry floor for continuous monitoring during stay  -BNP elevated 1728; initial troponin 0.05, and will be trended overnight  -EKG obtained in ED reviewed personally and found to be without evidence of LAD or acute ischemia  -Continue home medication dosage of DAPT, Lopressor, and Lipitor  -Patient with coarse rales therefore initiate IV Lasix 20 mg daily; oral regimen PTA only been taking 2 times per week  -ECHO scheduled in a.m. to further assess cardiac structure and function     PAF  -Admit to medical floor for continuous telemetry and pulse oximetry monitoring  -Patient adequately rate controlled on Lopressor therefore continue  -Patient with elevated [id number] score of 6, therefore recommend continued anticoagulation with renally dosed twice daily Eliquis    DVT prophylaxis-renally dosed Eliquis twice daily  Code status-full code  Diet-cardiac 2 g sodium with carb, renal, and volume restriction  IV access-PIV established in ED  Pure wick placed for strict I's and O's and to limit toileting efforts    Admit as inpatient. I anticipate hospitalization spanning more than two midnights for investigation and treatment of the above medically necessary diagnoses.       Comment: Please note this report has been produced using speech recognition software and may contain errors related to that system including errors in grammar, punctuation, and spelling, as well as words and phrases that may be inappropriate. If there are any questions or concerns please feel free to contact the dictating provider for clarification.          Merlene Gomez MD    1/4/2023 7:29 PM

## 2023-01-05 NOTE — CARE COORDINATION
CASE MANAGEMENT INITIAL ASSESSMENT      Reviewed chart and completed assessment with patient:yes  Family present: no  Explained Case Management role/services. Primary contact information: Jayson Cedeno, [address] Decision Maker :   Primary Decision Maker: Asim Hickey - Child - [phone]    Secondary Decision Maker: Hafsa Tapia - Niece/Nephew - [phone]          Can this person be reached and be able to respond quickly, such as within a few minutes or hours? Yes      Admit date/status:1/4/23  Diagnosis:Dyspnea on exertion   Is this a Readmission?:  No      Insurance:medicare with [facility] secondary   Precert required for SNF: No       3 night stay required: No    Living arrangements, Adls, care needs, prior to admission: one story house with son and his GF. Has a friend that stays with her during the day. Has 24/7 assist.     Durable Medical Equipment at home:  Walker_X_Cane_X_RTS__ BSC__Shower Chair_X_  02_X- [employer] 2.5lpm _ HHN__ CPAP__  BiPap_X- at [facility] Bed__ W/C___ Other_____    Services in the home and/or outpatient, prior to admission:none    Current PCP: Dr. Steffany Nick Prescription coverage? Yes Will pt require financial assistance with medications No     Transportation needs: per family       PT/OT recs:n/a     Hospital Exemption Notification (HEN):n/a    Barriers to discharge: none    Plan/comments:denies needs. Open to [facility] if needed. Pt is normally IPTA, and uses no DME at baseline. On home o2, 2.5 lpm baseline. Pt is on baseline now. Will follow.      ECOC on chart for MD signature

## 2023-01-05 NOTE — PROGRESS NOTES
01/05/23 0337   NIV Type   NIV Started/Stopped On   Equipment Type v60   Mode Bilevel   Mask Type Full face mask   Settings/Measurements   PIP Observed 12 cm H20   IPAP 12 cmH20   CPAP/EPAP 6 cmH2O   Vt (Measured) 452 mL   Rate Ordered 16   Resp 21   Insp Rise Time (%) 3 %   FiO2  30 %   I Time/ I Time % 0.9 s   Mask Leak (lpm) 25 lpm   Comfort Level Good   Using Accessory Muscles No   Patient's Home Machine No   Alarm Settings   Alarms On Y   Low Pressure (cmH2O) 6 cmH2O   High Pressure (cmH2O) 30 cmH2O   Delay Alarm 20 sec(s)   RR Low (bpm) 6   RR High (bpm) 40 br/min

## 2023-01-05 NOTE — PLAN OF CARE
Patient's EF (Ejection Fraction) is greater than 40%    Heart Failure Medications:  Diuretics[de-identified] Furosemide    (One of the following REQUIRED for EF </= 40%/SYSTOLIC FAILURE but MAY be used in EF% >40%/DIASTOLIC FAILURE)        ACE[de-identified] None        ARB[de-identified] None         ARNI[de-identified] None    (Beta Blockers)  NON- Evidenced Based Beta Blocker (for EF% >40%/DIASTOLIC FAILURE): Metoprolol TARTrate- Lopressor    Evidenced Based Beta Blocker::(REQUIRED for EF% <40%/SYSTOLIC FAILURE) None  . .................................................................................................................................................. Patient's weights and intake/output reviewed: Yes    Patient's Last Weight: 148 lbs obtained by bed scale. Difference of 0 lbs  NA  than last documented weight. Intake/Output Summary (Last 24 hours) at 1/5/2023 1403  Last data filed at 1/5/2023 0146  Gross per 24 hour   Intake --   Output 600 ml   Net -600 ml       Education Booklet Provided: yes    Comorbidities Reviewed Yes    Patient has a past medical history of NADJA (acute kidney injury) ([address] Asthma, CAD (coronary artery disease), CHF (congestive heart failure) ([address] Chronic anxiety, COPD (chronic obstructive pulmonary disease) ([address] GERD (gastroesophageal reflux disease), History of blood transfusion, Hyperlipidemia, Hypertension, MRSA (methicillin resistant staph aureus) culture positive, OA (osteoarthritis), and Thyroid disease. >>For CHF and Comorbidity documentation on Education Time and Topics, please see Education Tab    Progressive Mobility Assessment:  What is this patient's Current Level of Mobility?: Ambulatory- with Assistance  How was this patient Mobilized today?: Edge of Bed, Up to Chair, and Bedside Commode, ambulated 2 ft                 With Whom? Nurse and PCA                 Level of Difficulty/Assistance: 1x Assist     Pt resting in bed at this time on  2.5 L O2. Pt with complaints of shortness of breath.  Pt without lower extremity edema.      Patient and/or Family's stated Goal of Care this Admission: reduce shortness of breath, increase activity tolerance, better understand heart failure and disease management, be more comfortable, and reduce lower extremity edema prior to discharge        :

## 2023-01-05 NOTE — DISCHARGE INSTRUCTIONS
Heart Failure Resources:    Heart Failure Interactive Workbook:   Go to [email or web address] for a Free Heart Failure Interactive Workbook provided by Andrea. This interactive workbook will provide information on Healthier Living with Heart Failure. Please copy and paste link into search bar. Use your mouse to scroll through the pages. HF Arnold whitney:   Heart Failure Free smart phone whitney available for iPhone and Android download. Use your phone to track sodium intake, fluid intake, symptoms, and weight. Low Sodium Diet:  Go to www. [employer]. org website for [employer] which is Low Sodium! [employer] is a dialysis company, but this website offers free seasonal cookbooks. Each quarter, they will release 25-30 new recipes with a breakdown of calories, sodium, and glucose. Recipes:   Go to [email or web address] website for free recipes.

## 2023-01-05 NOTE — PROGRESS NOTES
01/04/23 2200   NIV Type   Skin Protection for O2 Device Yes   Location Nose   NIV Started/Stopped On   Equipment Type v60   Mode Bilevel   Mask Type Full face mask   Mask Size Medium   Settings/Measurements   PIP Observed 9 cm H20  (ramp applied for pt comfort)   IPAP 12 cmH20   CPAP/EPAP 6 cmH2O   Rate Ordered 16   Resp 26   FiO2  30 %   I Time/ I Time % 0.9 s   Minute Volume (L/min) 11.7 Liters   Mask Leak (lpm) 14 lpm   Comfort Level Good   Using Accessory Muscles No   SpO2 97   Patient's Home Machine No   Alarm Settings   Alarms On Y   Low Pressure (cmH2O) 6 cmH2O   High Pressure (cmH2O) 30 cmH2O   Delay Alarm 20 sec(s)   RR Low (bpm) 6   RR High (bpm) 40 br/min   Oxygen Therapy/Pulse Ox   Heart Rate 87   SpO2 97 %

## 2023-01-05 NOTE — PROGRESS NOTES
01/05/23 0153   NIV Type   NIV Started/Stopped On   Equipment Type v60   Mode Bilevel   Mask Type Full face mask   Settings/Measurements   PIP Observed 13 cm H20   IPAP 12 cmH20   CPAP/EPAP 6 cmH2O   Vt (Measured) 447 mL   Rate Ordered 16   Resp 26   Insp Rise Time (%) 3 %   FiO2  30 %   I Time/ I Time % 0.9 s   Mask Leak (lpm) 29 lpm   Comfort Level Good   Using Accessory Muscles No   SpO2 97   Patient's Home Machine No   Alarm Settings   Alarms On Y   Low Pressure (cmH2O) 6 cmH2O   High Pressure (cmH2O) 30 cmH2O   Delay Alarm 20 sec(s)   RR Low (bpm) 6   RR High (bpm) 40 br/min

## 2023-01-05 NOTE — PROGRESS NOTES
INTERNAL MED PROGRESS NOTE      Subjective     Patient voiced improvement in overall symptoms. She appears more relaxed with easy and unlabored breathing. Requiring 3 L of NC O2 which is baseline for her. Assessment/Plan      --Acute COPD exacerbation: Continue IV steroid, scheduled and as needed DuoNebs. Supplemental oxygen. --Presumed flash pulmonary edema: From respiratory distress. On limited treatment with IV Lasix, reassess volume status tomorrow morning. Patient appears clinically better. Checking 2D echo for current cardiac function and structure. --Acute hypoxic and hypercapnic respiratory failure: Was on BiPAP overnight, currently weaned off. Currently on 3 L of NC O2 which is baseline    --Paroxysmal A. fib: Fairly rate controlled at this point. Continue outpatient medication as ordered. Patient is on Eliquis for anticoagulation    --DM type II: Continue subcu insulin regimen including long-acting and short acting insulin as ordered, continue SSI protocol for more adequate glycemic control.    --HTN, controlled, cont home meds as ordered     --GERD: Cont home med     --HLD: Continue current statin therapy      --Anxiety/Depression: Stable, cont home meds         DVT prophylaxis: Heparin/Lovenox      Justin Grace MD  1/5/2023 @ 3:16 PM      Objective               Intake/Output Summary (Last 24 hours) at 1/5/2023 1516  Last data filed at 1/5/2023 0146  Gross per 24 hour   Intake --   Output 600 ml   Net -600 ml        Vitals:   Vitals:    01/05/23 1325   BP: 109/70   Pulse: 79   Resp:    Temp:    SpO2: 97%       Physical Exam       Constitutional: Well developed, Well nourished, NAD  Neurologic: Alert & oriented x 3, No focal deficits noted. CNs II-XII grossly intact and symmetrical  Psychiatric: Affect normal, Mood normal.  HENT: Normocephalic, Atraumatic,  Eyes: PERRLA, EOMI, No pallor/scleral icterus.   Neck: Normal range of motion, No tenderness, Supple, no bruit  Lymphatic: No cervical lymphadenopathy noted. Cardiovascular: Regular rate and rhythm, normal S1-S2, No murmurs, gallops or rubs. Thorax & Lungs: CTA bilaterally, No respiratory distress, No wheezing   Abdomen: Soft, BS +ve, no tenderness, no rebound or guarding  Skin: Warm, Dry, No erythema, No rash. Back: No tenderness, No CVA tenderness. Extremities: No edema, No tenderness  Musculoskeletal:. No major deformities noted.           Labs       Reviewed, as follows:  Recent Results (from the past 24 hour(s))   POCT Glucose    Collection Time: 01/04/23  7:34 PM   Result Value Ref Range    POC Glucose 122 (H) 70 - 99 mg/dl    Performed on ACCU-CHEK    Troponin    Collection Time: 01/04/23  8:30 PM   Result Value Ref Range    Troponin 0.04 (H) <0.01 ng/mL   Procalcitonin    Collection Time: 01/04/23  8:30 PM   Result Value Ref Range    Procalcitonin 0.13 0.00 - 0.15 ng/mL   Comprehensive Metabolic Panel w/ Reflex to MG    Collection Time: 01/05/23  5:31 AM   Result Value Ref Range    Sodium 138 136 - 145 mmol/L    Potassium reflex Magnesium 5.5 (H) 3.5 - 5.1 mmol/L    Chloride 101 99 - 110 mmol/L    CO2 24 21 - 32 mmol/L    Anion Gap 13 3 - 16    Glucose 124 (H) 70 - 99 mg/dL    BUN 37 (H) 7 - 20 mg/dL    Creatinine 1.6 (H) 0.6 - 1.2 mg/dL    Est, Glom Filt Rate 32 (A) >60    Calcium 10.2 8.3 - 10.6 mg/dL    Total Protein 8.2 6.4 - 8.2 g/dL    Albumin 3.8 3.4 - 5.0 g/dL    Albumin/Globulin Ratio 0.9 (L) 1.1 - 2.2    Total Bilirubin 0.3 0.0 - 1.0 mg/dL    Alkaline Phosphatase 71 40 - 129 U/L    ALT 7 (L) 10 - 40 U/L    AST 13 (L) 15 - 37 U/L   Lactic acid, plasma    Collection Time: 01/05/23  5:31 AM   Result Value Ref Range    Lactic Acid 4.2 (HH) 0.4 - 2.0 mmol/L   CBC auto differential    Collection Time: 01/05/23  5:31 AM   Result Value Ref Range    WBC 6.1 4.0 - 11.0 K/uL    RBC 4.65 4.00 - 5.20 M/uL    Hemoglobin 14.1 12.0 - 16.0 g/dL    Hematocrit 45.3 36.0 - 48.0 %    MCV 97.4 80.0 - 100.0 fL    MCH 30.3 26.0 - 34.0 pg    MCHC 31.1 31.0 - 36.0 g/dL    RDW 14.1 12.4 - 15.4 %    Platelets 586 098 - 843 K/uL    MPV 7.8 5.0 - 10.5 fL    Neutrophils % 85.9 %    Lymphocytes % 13.1 %    Monocytes % 0.9 %    Eosinophils % 0.0 %    Basophils % 0.1 %    Neutrophils Absolute 5.2 1.7 - 7.7 K/uL    Lymphocytes Absolute 0.8 (L) 1.0 - 5.1 K/uL    Monocytes Absolute 0.1 0.0 - 1.3 K/uL    Eosinophils Absolute 0.0 0.0 - 0.6 K/uL    Basophils Absolute 0.0 0.0 - 0.2 K/uL   Troponin    Collection Time: 01/05/23  5:31 AM   Result Value Ref Range    Troponin 0.03 (H) <0.01 ng/mL   Troponin    Collection Time: 01/05/23  7:54 AM   Result Value Ref Range    Troponin 0.02 (H) <0.01 ng/mL   POCT Glucose    Collection Time: 01/05/23  7:54 AM   Result Value Ref Range    POC Glucose 143 (H) 70 - 99 mg/dl    Performed on ACCU-CHEK    Troponin    Collection Time: 01/05/23 10:35 AM   Result Value Ref Range    Troponin 0.04 (H) <0.01 ng/mL   POCT Glucose    Collection Time: 01/05/23 11:08 AM   Result Value Ref Range    POC Glucose 99 70 - 99 mg/dl    Performed on ACCU-CHEK           Body mass index is 25.54 kg/m².  Patient will follow up with PCP for further management as indicated unless otherwise specifically noted above        April Malcolm MD  1/5/2023 @ 3:16 PM

## 2023-01-05 NOTE — PROGRESS NOTES
Physical Therapy  Facility/Department: [address] [place], [place] [address]  Physical Therapy Initial Assessment    Name: Amy Sinclair  : 1940  MRN: [id number]  Date of Service: 2023    Discharge Recommendations:  24 hour supervision or assist, Home with Home health PT   PT Equipment Recommendations  Equipment Needed: No      Patient Diagnosis(es): The primary encounter diagnosis was COPD exacerbation ([address] Diagnoses of Acute on chronic respiratory failure with hypoxia (HCC), ZENDEJAS (dyspnea on exertion), Acute kidney injury ([address] and Transient hypotension were also pertinent to this visit. Past Medical History:  has a past medical history of NADJA (acute kidney injury) ([facility] Asthma, CAD (coronary artery disease), CHF (congestive heart failure) ([address] Chronic anxiety, COPD (chronic obstructive pulmonary disease) ([facility] [address] GERD (gastroesophageal reflux disease), History of blood transfusion, Hyperlipidemia, Hypertension, MRSA (methicillin resistant staph aureus) culture positive, OA (osteoarthritis), and Thyroid disease. Past Surgical History:  has a past surgical history that includes  section; Mastectomy, partial (Left); bronchoscopy (2019); Cardiac catheterization (2019); Coronary artery bypass graft (N/A, 2019); Colonoscopy (N/A, 2020); Sigmoidoscopy (2020); sigmoidoscopy (N/A, 2020); IR MIDLINE CATH (2021); and Colonoscopy (N/A, 10/22/2021). Assessment   Body Structures, Functions, Activity Limitations Requiring Skilled Therapeutic Intervention: Decreased functional mobility ; Decreased endurance;Decreased balance  Assessment: Pt is an 80 y/o female who presents with acute on chronic respiratory failure with hypoxia. Pt was living with son and his girlfriend in single story home with caregiver during the day while son works. Pt currently requires min A for transfers and ambulation without device. Pt would benefit from continued skilled PT to address these limitations. Recommend home with 24hr supervision and home PT at discharge. Treatment Diagnosis: impaired functional mobility  Therapy Prognosis: Good  Decision Making: Medium Complexity  Requires PT Follow-Up: Yes  Activity Tolerance  Activity Tolerance: Patient tolerated evaluation without incident;Patient limited by fatigue;Patient limited by endurance  Activity Tolerance Comments: SpO2 96-99% throughout evaluation, however pt SOB with activity     Plan   Physcial Therapy Plan  General Plan: 3-5 times per week  Current Treatment Recommendations: Strengthening, Balance training, Functional mobility training, Gait training, Home exercise program, Safety education & training, Patient/Caregiver education & training, Transfer training, Endurance training, Therapeutic activities  Safety Devices  Type of Devices: Gait belt, Nurse notified, Left in chair, Chair alarm in place, Call light within reach, Telesitter in use     Restrictions  Restrictions/Precautions  Restrictions/Precautions: Fall Risk  Position Activity Restriction  Other position/activity restrictions: 2L O2     Subjective   Pain: 10/10 pain in bilateral feet and hands  General  Chart Reviewed: Yes  Patient assessed for rehabilitation services?: Yes  Family / Caregiver Present: No  Referring Practitioner: Ramya Camara MD  Referral Date : 01/05/23  Diagnosis: acute on chronic respiratory failure with hypoxia  Follows Commands: Within Functional Limits  Subjective  Subjective: Pt agreeable to evaluation.          Social/Functional History  Social/Functional History  Lives With: Son (and his girlfriend; son works during the day)  Type of Home: [address]: One level  Home Access: Stairs to enter without rails  Entrance Stairs - Number of Steps: 1 DIANE  Bathroom Shower/Tub: Tub/Shower unit, Shower chair with back  [employer]: Standard (raised toilet seat with rails)  Bathroom Equipment: [address]: Alex Gwynn, Prieto Geller, Jaziel, marylou, Lift chair, Rollator, Oxygen, Hospital bed (transport chair; 2.5L O2)  Has the patient had two or more falls in the past year or any fall with injury in the past year?: No  Receives Help From:  (Aid from 9-5 M-F)  ADL Assistance: Independent (typically takes a sponge bath and able to complete with setup)  Homemaking Assistance:  (reports she has assistance for homemaking)  Ambulation Assistance: Independent  Transfer Assistance: Independent  Active : No  Patient's  Info: family takes to appointments  Vision/Hearing  Vision  Vision: Impaired  Vision Exceptions: Cataracts  Hearing  Hearing: Within functional limits        Objective   Heart Rate: 79  Heart Rate Source: Monitor  BP: 109/70  BP Location: Left upper arm  BP Method: Automatic  Patient Position: Semi fowlers  MAP (Calculated): 83  Resp: 16  SpO2: 97 %  O2 Device: Nasal cannula (2L O2)        Gross Assessment  AROM: Within functional limits  Strength: Generally decreased, functional                 Bed Mobility Training  Bed Mobility Training: Yes  Supine to Sit: Modified independent  Balance  Sitting: Intact  Standing: Impaired  Transfer Training  Transfer Training: Yes  Overall Level of Assistance: Minimum assistance  Sit to Stand: Minimum assistance  Stand to Sit: Minimum assistance  Toilet Transfer: Minimum assistance  Gait Training  Gait Training: Yes  Gait  Overall Level of Assistance: Minimum assistance  Base of Support: Widened  Speed/Harika: Accelerated  Gait Abnormalities: Decreased step clearance; Path deviations  Distance (ft): 30 Feet (x3 trials)  Assistive Device: Gait belt                          AM-PAC Score  AM-PAC Inpatient Mobility Raw Score : 20 (01/05/23 1422)  AM-PAC Inpatient T-Scale Score : 47.67 (01/05/23 1422)  Mobility Inpatient CMS 0-100% Score: 35.83 (01/05/23 1422)  Mobility Inpatient CMS G-Code Modifier : CJ (01/05/23 1422)        Goals  Short Term Goals  Time Frame for Short Term Goals: 7 days (1/12/23)  Short Term Goal 1: Pt will perform transfers mod I  Short Term Goal 2: Pt will ambulate 50 ft without device with supervision  Short Term Goal 3: Pt will meet 3/5 CHF goals  Short Term Goal 4: By 1/8/23, pt will tolerate 15 reps of LE ther ex for improved strength and activity tolerance  Patient Goals   Patient Goals : \"to go back home\"       Education  Patient Education  Education Given To: Patient  Education Provided: Role of Therapy;Plan of Care;Family Education; Energy Conservation  Education Provided Comments: safety with mobility and use of call light; educated on CHF packet  Education Method: Demonstration;Verbal  Barriers to Learning: Vision  Education Outcome: Verbalized understanding;Continued education needed     PHYSICAL THERAPY HEART FAILURE EDUCATION:  PHYSICAL THERAPY HEART FAILURE EDUCATION GOALS:  1. Identifying HF Activity Zone with the Self Check Plan prior to exercises or walking    Patient educated in and demonstrated/verbalized understanding Identifying HF activity zone with the Self Check Plan referencing Red/Yellow/Green Light Symbol prior to exercises or walking  to appropriately determine daily activity level. 2.Rating self on CORDELIA scale of perceived exertion   Patient educated in and demonstrated and/or verbalized understanding Rating self on CORDELIA scale of perceived exertion  3. HF Therapeutic Exercises  Pt educated in and completed Therapeutic exercises (Supine, Seated ,Standing, walking) as indicated below for 1 set) to promote circulation and prevent complications of bedrest with patient verbalizes understanding of employing green zone, yellow zone and red zone to seek provider input and evaluation. 4. Daily Weight check/Stepping on Scale  Pt educated in importance of checking body weight daily.  Barriers to completion of Daily weight check are identified with recommendations made or Patient demonstrates and/or verbalizes safety in:stepping up to weight self and complete downward gaze/head nod to read scale on standard scale  and safety stepping off scale. 5. Teach back of Elements of PT HF Education  Pt voices and demonstrates appropriate teach back of elements of Physical Therapy Heart Failure Education Program                        1)Heart Failure Zones Self Check Plan referencing Red/Yellow/Green Light Symbol with:  []Green Zone/All Clear:   physical activity is normal for you,   No new swelling in feet, ankles, legs or stomach,   No weight gain of more than 2-3 pounds,   No chest pain or worsening of shortness of breath. (Continue daily: weight check, meds as directed, low salt eating, monitoring of fluid intake, balance activity, follow up visits)  [x]Yellow Zone/Caution:   Increased cough or shortness of breath with activity  Increased swelling in your feet, ankles, legs or stomach from baseline  Weight gain or loss of more than 2-3 pounds in 1 day. Increase in the number of pillows needed while sleeping  (Check In!: You need to contact your doctor or provider as soon as possible)  []Red Zone/Medical Alert:  Unrelieved chest pain or shortness of breath, especially while resting  Increased Discomfort or swelling in the abdomen or lower body  Sudden weight gain of more than 5 pounds in a week  Increased cough with bubbly and/or pink sputum  (Warning: You need to be seen right away . If you cannot reach your physician, call 911)    2)Michelle Rating of Perceived Exertion (RPE) Scale     The Michelle rating scale ranges from 6 to 20, where 6 means \"no exertion at all\" and 20 means \"maximal exertion. \" The patient chooses the number that best describes their level of exertion. This will objectify the intensity level of the patient's activity, and the therapist can use this information to accelerate or decelerate activity levels to reach the desired range. The patient should appraise their feeling of exertion as honestly as possible, without thinking about what the actual physical load is.  Their feeling of effort and exertion is important, and it should not be compared to the level of others. Patient's should target exercises for very light to moderate (9-11/20) for this phase of their rehab. Michelle RPE Scale    Activity Completed:      []6  No exertion at all  []7  Extremely light  []8  [] 9  Very light (For a healthy person, it is like walking slowly at his or her own pace for some minutes)  []10  []11  Light  []12  []13  Somewhat hard (exercise, but it still feels OK to continue)  []14  []15  Hard (heavy)  []16  []17  Very hard (can still go on, but really has to push himself. It feels very heavy, and the person is very tired. )[]18  []19  Extremely hard (For most people this is the most strenuous exercise they have ever experienced.)  []20  Maximal exertion. 3) Pt educated in and completed Therapeutic exercise (as indicated below for 1 set) to promote circulation and prevent complications of bedrest with patient verbalizes understanding of employing green zone, yellow zone and red zone to seek provider input and evaluation.   Educated patient in :  []Supine    Level 1: Bed Exercise 10-15 reps, 2x/day  []Ankle Pumps  []Heel Slides  []Hip Abduction  []Buttocks Squeeze  []Diaphragmatic Breathing with TA set  []Shoulder Shrugs  []Bicep Curl  []Hands open/close                          []Sitting    Level 2: Seated Exercises 10-15 reps, 2x/day  []Toe raise/heel raise  []Long Arc Quad  []Seated March  []Seated Clamshell  []Diaphragmatic Breathing with TA set and BUE ER and IR  []Shoulder Shrugs  []Bicep Curls  []Hands open/close                         []Standing   Level 3: Standing Exercises 10-15 reps, 2x/day     []Sit to/from stand  []Standing march  []Standing side steps  []Standing bilateral heel raise  []Diaphragmatic breathing with TA set and BUE flex/ext  []Shoulder Shrugs  []Bicep Curls  []Hands open/close                        []WalkingLevel 1: Educated patient in initiating walking when in the Green zone and to [employer] with 2-5 minutes daily and work up to 10 minutes per day. Walk at a slow, comfortable pace with your exertion level Very Light (CORDELIA 9) to Light (CORDELIA 11)   You should be able to comfortably hold a conversation while walking. 4)Daily Weight check/Stepping on Scale  [x]Pt educated in importance of checking body weight daily and []demonstrate/[x]verbalizes safety in:stepping up to weigh self and complete downward gaze/head nod to read scale on standard scale  and safety stepping off scale. []Barriers to completion of Daily weight check:       5)Pt voices and demonstrates appropriate teach back of Heart Failure Education Program with : use of the   [x]1. Identifying Appropriate Zone from HF Zone Self-Check Plan                          []2. Rating self on CORDELIA.  []3. Therapeutic exercise/walking program      [x]4.  Importance of taking daily weight measurement             [x]5. Safely stepping on and off scale    []Pt will benefit from reinforcement of education due to   []Readiness to learn                               []Decreased cognition  []Language barrier                                  [x]Decreased vision/hearing  [x]First introduction to new information    [x]Requires intermittent cues  []Other:    Education provided via:  [x]Oral instruction  []Demonstration  [x]Written handout        Therapy Time   Individual Concurrent Group Co-treatment   Time In 1309         Time Out 1357         Minutes 48         Timed Code Treatment Minutes: [address] [facility],   [facility]

## 2023-01-05 NOTE — PROGRESS NOTES
Occupational Therapy  Facility/Department: [facility] C4 PCU  Occupational Therapy Initial Assessment/Treatment    Name: Derik Rojas  : 1940  MRN: [id number]  Date of Service: 2023    Discharge Recommendations:  24 hour supervision or assist, Home with Home health OT          Patient Diagnosis(es): The primary encounter diagnosis was COPD exacerbation ([address] Diagnoses of Acute on chronic respiratory failure with hypoxia (HCC), ZENDEJAS (dyspnea on exertion), Acute kidney injury ([address] and Transient hypotension were also pertinent to this visit. Past Medical History:  has a past medical history of NADJA (acute kidney injury) ([address] Asthma, CAD (coronary artery disease), CHF (congestive heart failure) ([address] Chronic anxiety, COPD (chronic obstructive pulmonary disease) ([address] GERD (gastroesophageal reflux disease), History of blood transfusion, Hyperlipidemia, Hypertension, MRSA (methicillin resistant staph aureus) culture positive, OA (osteoarthritis), and Thyroid disease. Past Surgical History:  has a past surgical history that includes  section; Mastectomy, partial (Left); bronchoscopy (2019); Cardiac catheterization (2019); Coronary artery bypass graft (N/A, 2019); Colonoscopy (N/A, 2020); Sigmoidoscopy (2020); sigmoidoscopy (N/A, 2020); IR MIDLINE CATH (2021); and Colonoscopy (N/A, 10/22/2021). Assessment   Performance deficits / Impairments: Decreased functional mobility ; Decreased strength;Decreased endurance;Decreased ADL status; Decreased safe awareness;Decreased balance  Patient admitted with COPD exacerbation on CHF track as well. Patient is aware of her fluid restrictions. Pema for functional transfers and LE dressing this date. After evaluation, pt found to be presenting with the above mentioned occupational performance deficits which are affecting participation in daily living skills.  Pt would benefit from continued skilled occupational therapy to address ADLs, functional mobility, and safety while in acute care. Prognosis: Good  Decision Making: Low Complexity  REQUIRES OT FOLLOW-UP: Yes  Activity Tolerance  Activity Tolerance: Patient Tolerated treatment well;Patient limited by fatigue        Plan   Occupational Therapy Plan  Times Per Week: 3-5x's a week while in acute care     Restrictions  Restrictions/Precautions  Restrictions/Precautions: Fall Risk  Position Activity Restriction  Other position/activity restrictions: 2L O2    Subjective   General  Chart Reviewed: Yes, Orders, Progress Notes, History and Physical  Patient assessed for rehabilitation services?: Yes  Family / Caregiver Present: No  Referring Practitioner: Nathan Hawk MD 1/05/23  Diagnosis: Acute COPD exacerbation  Subjective  Subjective: Pt pleasant and agreeable to OT evlauation, states needs to use the bathroom.   Pain: 10/10 pain in bilateral feet and hands     Social/Functional History  Social/Functional History  Lives With: Son (and his girlfriend; son works during the day)  Type of Home: House  Home Layout: One level  Home Access: Stairs to enter without rails  Entrance Stairs - Number of Steps: 1 DIANE  Bathroom Shower/Tub: Tub/Shower unit, Shower chair with back  [employer]: Standard (raised toilet seat with rails)  Bathroom Equipment: [address]: [place], [facility], Walker, rolling, Lift chair, Rollator, Oxygen, Hospital bed (transport chair; 2.5L O2)  Has the patient had two or more falls in the past year or any fall with injury in the past year?: No  Receives Help From:  (Aid from 9-5 M-F)  ADL Assistance: Independent (typically takes a sponge bath and able to complete with setup)  Homemaking Assistance:  (reports she has assistance for homemaking)  Ambulation Assistance: Independent  Transfer Assistance: Independent  Active : No  Patient's  Info: family takes to appointments       Objective   Heart Rate: 79  Heart Rate Source: Monitor  BP: 109/70  BP Location: Left upper arm  BP Method: Automatic  Patient Position: Semi fowlers  MAP (Calculated): 83  Resp: 16  SpO2: 97 %  O2 Device: Nasal cannula (2L O2)             Safety Devices  Type of Devices: Gait belt;Nurse notified; Left in chair;Chair alarm in place;Call light within reach; Telesitter in use    Bed Mobility Training  Bed Mobility Training: Yes  Supine to Sit: Modified independent    Balance  Sitting: Intact  Standing: Impaired  Transfer Training  Transfer Training: Yes  Overall Level of Assistance: Minimum assistance  Sit to Stand: Minimum assistance  Stand to Sit: Minimum assistance  Toilet Transfer: Minimum assistance    Gait Training  Gait Training: Yes  Gait  Overall Level of Assistance: Minimum assistance  Base of Support: Widened  Speed/Harika: Accelerated  Gait Abnormalities: Decreased step clearance; Path deviations  Distance (ft): 30 Feet (x3 trials)  Assistive Device: Gait belt     AROM: Within functional limits  PROM: Within functional limits  Strength: Generally decreased, functional  Coordination: Generally decreased, functional  Tone: Normal  Sensation: Intact    ADL  Grooming: Stand by assistance  UE Dressing: Stand by assistance  LE Dressing: Minimal assistance  Toileting: Stand by assistance     Activity Tolerance  Activity Tolerance: Patient tolerated evaluation without incident;Patient limited by fatigue;Patient limited by endurance  Activity Tolerance Comments: SpO2 96-99% throughout evaluation, however pt SOB with activity        Vision  Vision: Impaired  Vision Exceptions: Cataracts  Hearing  Hearing: Within functional limits    Cognition  Overall Cognitive Status: Exceptions  Arousal/Alertness: Appropriate responses to stimuli  Following Commands: Follows multistep commands with increased time; Follows multistep commands with repitition  Attention Span: Attends with cues to redirect  Memory: Appears intact  Safety Judgement: Decreased awareness of need for safety;Decreased awareness of need for assistance  Problem Solving: Decreased awareness of errors  Insights: Decreased awareness of deficits  Initiation: Does not require cues  Sequencing: Requires cues for some  Orientation  Overall Orientation Status: Within Functional Limits         Education Given To: Patient  Education Provided: Role of Therapy;Plan of Care;Transfer Training;ADL Adaptive Strategies; Energy Conservation; Fall Prevention Strategies; Equipment  Education Provided Comments: Disease specific: see CHF education  Education Method: Demonstration;Verbal;Teach Back;Printed Information/Hand-outs  Barriers to Learning: Cognition  Education Outcome: Verbalized understanding;Demonstrated understanding;Continued education needed         OCCUPATIONAL THERAPY HEART FAILURE EDUCATION    Marsha Miller    : 1940  Acct #: [de-identified]  MRN: [id number]  PHYSICIAN:  Glenis Faith MD    OT Heart Failure Education Goals    Patient educated and demonstrates /verbalizes appropriate teach back with ability to self rate on CORDELIA and identify HF activity zone, prior to initiation of ADLs to appropriately determine need for daily activity level/ energy conservation/pacing. [] Goal Met, [] Goal Not Met    Patient demonstrates /[id number] understanding of appropriate employment of Energy Conservation with every day activity. [x] Goal Met, [] Goal Not Met    Patient demonstrates/verbalizes ability to correctly identify mL to cups conversion, what constitutes FLUID in diet, and knowledge of when to communicate changes in weight to physician consistent with patient orders and HF education.      [x] Goal Met, [] Goal Not Met, [] Goal not appropriate for pt     Pt voices and demonstrates appropriate teach back of Heart Failure Education Program with the use of:  [] Energy Conservation techniques                              [] Choosing daily activity level  [] Importance of fluid restriction               Pt will benefit from reinforcement of education due to   [] Readiness to learn                               [] Decreased cognition  [] Language barrier                                  [] Decreased vision/hearing  [] First introduction to new information      [] Current Living (LTC, SNF, etc)  []Other:    Education provided via:  [x] Oral instruction  [] Demonstration  [x] Written handout    ------------------------------------------------------------------------------------------------------------------------------------                    1)Heart Failure Zones Self Check Plan referencing Red/Yellow/Green Light Symbol with:  [] Green Zone/All Clear:   physical activity is normal for you,   No new swelling in feet, ankles, legs or stomach,   No weight gain of more than 2-3 pounds,   No chest pain or worsening of shortness of breath. (Continue daily: weight check, meds as directed, low salt eating, monitoring of fluid intake, balance activity, follow up visits)  [x] Yellow Zone/Caution:   Increased cough or shortness of breath with activity  Increased swelling in your feet, ankles, legs or stomach from baseline  Weight gain or loss of more than 2-3 pounds in 1 day. Increase in the number of pillows needed while sleeping  (Check In!: You need to contact your doctor or provider as soon as possible)  [] Red Zone/Medical Alert:  Unrelieved chest pain or shortness of breath, especially while resting  Increased Discomfort or swelling in the abdomen or lower body  Sudden weight gain of more than 5 pounds in a week  Increased cough with bubbly and/or pink sputum  (Warning: You need to be seen right away . If you cannot reach your physician, call 911)    Patient educated in and []demonstrated/[]verbalized understanding of identifying HF activity zone with the Self Check Plan referencing Red/Yellow/Green Light Symbol.  *prior to ADL's/activity  to appropriately determine daily activity level*  ------------------------------------------------------------------------------------------------------------------------------------         2)Michelle Rating of Perceived Exertion (RPE) Scale     The Michelle rating scale ranges from 6 to 20, where 6 means \"no exertion at all\" and 20 means \"maximal exertion. \" The patient chooses the number that best describes their level of exertion. This will objectify the intensity level of the patient's activity, and the therapist can use this information to accelerate or decelerate activity levels to reach the desired range. The patient should appraise their feeling of exertion as honestly as possible, without thinking about what the actual physical load is. Their feeling of effort and exertion is important, and it should not be compared to the level of others. Michelle RPE Scale  Activity Completed:     [] 6  No exertion at all  [] 7  Extremely light  [] 8  [] 9  Very light (For a healthy person, it is like walking slowly at his or her own pace for some minutes)  []10  []11  Light  []12  []13  Somewhat hard (exercise, but it still feels OK to continue)  []14  []15  Hard (heavy)  []16  []17  Very hard (can still go on, but really has to push himself. It feels very heavy, and the person is very tired.)  []18  []19  Extremely hard (For most people this is the most strenuous exercise they have ever experienced.)  []20  Maximal exertion.     Patient educated in and []demonstrated/[]verbalized understanding []teach back  []Rating self on MICHELLE and   []Identifying HF activity zone with the Self Check Plan referencing Red/Yellow/Green Light Symbol *:prior to ADls/activity to appropriately determine daily activity level.    ------------------------------------------------------------------------------------------------------------------------------------         3) 5 P's of Energy Conservation    Pt was educated on the following aspects of Energy Conservation techniques. Prioritize/Plan: Decide what needs to be done today, and what can wait for a later date, write to do lists, Plan ahead to avoid extra trips, Gather supplies and equipment needed before starting an activity. Position: Avoid tiring and awkward posture that may impair breathing  Pace: Slow and steady pace, never rushing! Pursed lip breathing. Pursed Lip Breathing: \"Smell the roses, blow out the candles\"    Patient []demonstrates / [x]verbalizes understanding of appropriate employment of Energy Conservation with every day activity.    ----------------------------------------------------------------------------------------------------------------------------------    4) Fluid intake tracking    Patient  [x]demonstrates/[x]verbalizes ability to correctly identify mL to cups conversion, what constitutes FLUID in diet, and  knowledge of when to  communicate changes in weight to physician consistent with patient orders and HF education.       Pt participated in the following fluid tracking management   [] Identifying 4, 8, and 12 ounces of fluid size  [x] Identify mL to cup conversion  [x] Understanding Jello, watermelon and Ice cream contributing to fluid intake   [x] Acknowledge current fluid restriction limits/orders    AM-PAC Score        AM-[facility] Inpatient Daily Activity Raw Score: 17 (01/05/23 1349)  AM-PAC Inpatient ADL T-Scale Score : 37.26 (01/05/23 1349)  ADL Inpatient CMS 0-100% Score: 50.11 (01/05/23 1349)  ADL Inpatient CMS G-Code Modifier : CK (01/05/23 1349)       Goals  Short Term Goals  Time Frame for Short Term Goals: 1 week 1/12  Short Term Goal 1: Pt to voice understanding with teach-back of 1/4 heart failure goals (see separate \"heart failure\" therapy note for details)-GOAL MET 1/5/23  Short Term Goal 2: Pt will complete LE dressing with SBA by 1/10  Short Term Goal 3: PT will complete toilet transfers with Prabhu  Short Term Goal 4: Pt will complete standing level ADLs with SBA for balance  Patient Goals   Patient goals : \"to go home\"       Therapy Time   Individual Concurrent Group Co-treatment   Time In 1309         Time Out 1357         Minutes 48         Timed Code Treatment Minutes: 38 Minutes       Jc House, OTR/L  If pt is unable to be seen after this session, please let this note serve as discharge summary. Please see case management note for discharge disposition. Thank you.

## 2023-01-05 NOTE — PROGRESS NOTES
4 Eyes Skin Assessment     The patient is being assess for   Admission    I agree that 2 RN's have performed a thorough Head to Toe Skin Assessment on the patient. ALL assessment sites listed below have been assessed. Areas assessed for pressure by both nurses:   [x]   Head, Face, and Ears   [x]   Shoulders, Back, and Chest, Abdomen  [x]   Arms, Elbows, and Hands   [x]   Coccyx, Sacrum, and Ischium  [x]   Legs, Feet, and Heels                                **SHARE this note so that the co-signing nurse is able to place an eSignature**    Co-signer eSignature: Electronically signed by Machelle Miguel RN on 1/5/23 at 5:43 AM EST    Does the Patient have Skin Breakdown related to pressure?   No              Trey Prevention initiated:  Yes   Wound Care Orders initiated:  Yes      [id number]  nurse consulted for Pressure Injury (Stage 3,4, Unstageable, DTI, NWPT, Complex wounds)and New or Established Ostomies:  NA      Primary Nurse eSignature: Electronically signed by Hong Sheppard on 1/5/23 at 5:42 AM EST

## 2023-01-06 LAB
GLUCOSE BLD-MCNC: 106 MG/DL (ref 70–99)
GLUCOSE BLD-MCNC: 139 MG/DL (ref 70–99)
GLUCOSE BLD-MCNC: 168 MG/DL (ref 70–99)
GLUCOSE BLD-MCNC: 87 MG/DL (ref 70–99)
PERFORMED ON: ABNORMAL
PERFORMED ON: NORMAL

## 2023-01-06 PROCEDURE — 6370000000 HC RX 637 (ALT 250 FOR IP): Performed by: HOSPITALIST

## 2023-01-06 PROCEDURE — 2700000000 HC OXYGEN THERAPY PER DAY

## 2023-01-06 PROCEDURE — 97530 THERAPEUTIC ACTIVITIES: CPT

## 2023-01-06 PROCEDURE — 94761 N-INVAS EAR/PLS OXIMETRY MLT: CPT

## 2023-01-06 PROCEDURE — 94640 AIRWAY INHALATION TREATMENT: CPT

## 2023-01-06 PROCEDURE — 6360000002 HC RX W HCPCS: Performed by: HOSPITALIST

## 2023-01-06 PROCEDURE — 2060000000 HC ICU INTERMEDIATE R&B

## 2023-01-06 PROCEDURE — 2580000003 HC RX 258: Performed by: HOSPITALIST

## 2023-01-06 PROCEDURE — 97110 THERAPEUTIC EXERCISES: CPT

## 2023-01-06 PROCEDURE — 97116 GAIT TRAINING THERAPY: CPT

## 2023-01-06 RX ORDER — LORAZEPAM 0.5 MG/1
0.5 TABLET ORAL EVERY 6 HOURS PRN
Status: DISCONTINUED | OUTPATIENT
Start: 2023-01-06 | End: 2023-01-15 | Stop reason: HOSPADM

## 2023-01-06 RX ADMIN — APIXABAN 2.5 MG: 2.5 TABLET, FILM COATED ORAL at 20:13

## 2023-01-06 RX ADMIN — ALBUTEROL SULFATE 2.5 MG: 2.5 SOLUTION RESPIRATORY (INHALATION) at 07:10

## 2023-01-06 RX ADMIN — BUSPIRONE HYDROCHLORIDE 5 MG: 5 TABLET ORAL at 10:07

## 2023-01-06 RX ADMIN — BUSPIRONE HYDROCHLORIDE 5 MG: 5 TABLET ORAL at 20:13

## 2023-01-06 RX ADMIN — FUROSEMIDE 20 MG: 10 INJECTION, SOLUTION INTRAMUSCULAR; INTRAVENOUS at 10:06

## 2023-01-06 RX ADMIN — SODIUM CHLORIDE, PRESERVATIVE FREE 10 ML: 5 INJECTION INTRAVENOUS at 20:21

## 2023-01-06 RX ADMIN — ACETAMINOPHEN 325MG 650 MG: 325 TABLET ORAL at 00:37

## 2023-01-06 RX ADMIN — Medication 2 PUFF: at 07:20

## 2023-01-06 RX ADMIN — PANTOPRAZOLE SODIUM 40 MG: 40 TABLET, DELAYED RELEASE ORAL at 10:07

## 2023-01-06 RX ADMIN — METHYLPREDNISOLONE SODIUM SUCCINATE 40 MG: 40 INJECTION, POWDER, FOR SOLUTION INTRAMUSCULAR; INTRAVENOUS at 11:19

## 2023-01-06 RX ADMIN — BUSPIRONE HYDROCHLORIDE 5 MG: 5 TABLET ORAL at 13:36

## 2023-01-06 RX ADMIN — ALBUTEROL SULFATE 2.5 MG: 2.5 SOLUTION RESPIRATORY (INHALATION) at 11:51

## 2023-01-06 RX ADMIN — ATORVASTATIN CALCIUM 40 MG: 40 TABLET, FILM COATED ORAL at 20:13

## 2023-01-06 RX ADMIN — Medication 2 PUFF: at 20:26

## 2023-01-06 RX ADMIN — DOXYCYCLINE HYCLATE 100 MG: 100 TABLET, COATED ORAL at 00:34

## 2023-01-06 RX ADMIN — METOPROLOL TARTRATE 25 MG: 25 TABLET, FILM COATED ORAL at 10:07

## 2023-01-06 RX ADMIN — ALBUTEROL SULFATE 2.5 MG: 2.5 SOLUTION RESPIRATORY (INHALATION) at 16:09

## 2023-01-06 RX ADMIN — Medication 400 MG: at 10:07

## 2023-01-06 RX ADMIN — SODIUM CHLORIDE, PRESERVATIVE FREE 10 ML: 5 INJECTION INTRAVENOUS at 10:08

## 2023-01-06 RX ADMIN — SERTRALINE HYDROCHLORIDE 50 MG: 50 TABLET ORAL at 10:07

## 2023-01-06 RX ADMIN — GUAIFENESIN 600 MG: 600 TABLET, EXTENDED RELEASE ORAL at 20:13

## 2023-01-06 RX ADMIN — CLOPIDOGREL BISULFATE 75 MG: 75 TABLET ORAL at 10:07

## 2023-01-06 RX ADMIN — DOXYCYCLINE HYCLATE 100 MG: 100 TABLET, COATED ORAL at 11:19

## 2023-01-06 RX ADMIN — FERROUS SULFATE TAB 325 MG (65 MG ELEMENTAL FE) 325 MG: 325 (65 FE) TAB at 10:07

## 2023-01-06 RX ADMIN — ROFLUMILAST 500 MCG: 500 TABLET ORAL at 10:07

## 2023-01-06 RX ADMIN — APIXABAN 2.5 MG: 2.5 TABLET, FILM COATED ORAL at 10:07

## 2023-01-06 RX ADMIN — ALBUTEROL SULFATE 2.5 MG: 2.5 SOLUTION RESPIRATORY (INHALATION) at 20:28

## 2023-01-06 RX ADMIN — GUAIFENESIN 600 MG: 600 TABLET, EXTENDED RELEASE ORAL at 10:07

## 2023-01-06 RX ADMIN — METHYLPREDNISOLONE SODIUM SUCCINATE 40 MG: 40 INJECTION, POWDER, FOR SOLUTION INTRAMUSCULAR; INTRAVENOUS at 00:33

## 2023-01-06 RX ADMIN — TIOTROPIUM BROMIDE INHALATION SPRAY 2 PUFF: 3.12 SPRAY, METERED RESPIRATORY (INHALATION) at 07:15

## 2023-01-06 ASSESSMENT — PAIN DESCRIPTION - DESCRIPTORS: DESCRIPTORS: ACHING;DISCOMFORT

## 2023-01-06 ASSESSMENT — PAIN SCALES - GENERAL: PAINLEVEL_OUTOF10: 4

## 2023-01-06 ASSESSMENT — PAIN DESCRIPTION - LOCATION: LOCATION: GENERALIZED

## 2023-01-06 NOTE — CARE COORDINATION
[facility]    Referral received from  to follow for home care services. I will follow for needs, and speak with patient to verify demos.     Maura Cerrato RN, BSN CTN  [facility] [phone]

## 2023-01-06 NOTE — PROGRESS NOTES
INTERNAL MED PROGRESS NOTE      Subjective     Patient reports an episode of increased shortness of breath this a.m., she also report shakiness and believes it might be related to steroids. Assessment/Plan      --Acute COPD exacerbation: Continue IV steroid, scheduled and as needed DuoNebs. Supplemental oxygen. --Presumed flash pulmonary edema: Treated with IV Lasix x3 doses. Clinically resolved. --Acute hypoxic and hypercapnic respiratory failure: Was on BiPAP overnight, currently weaned off. Still requiring 3 to 4 L this a.m. Continue to wean down to baseline requirement of 2.5 L as able     --Paroxysmal A. fib: Fairly rate controlled at this point. Continue outpatient medication as ordered. Patient is on Eliquis for anticoagulation    --DM type II: Continue subcu insulin regimen including long-acting and short acting insulin as ordered, continue SSI protocol for more adequate glycemic control.    --HTN, controlled, cont home meds as ordered     --GERD: Cont home med     --HLD: Continue current statin therapy      --Anxiety/Depression: Patient seems very anxious shakiness appears to be more related to this than the effect of steroid. Will place on as needed Ativan         DVT prophylaxis: Heparin/Lovenox      Liban Johnston MD  1/6/2023 @ 12:18 PM      Objective               Intake/Output Summary (Last 24 hours) at 1/6/2023 1218  Last data filed at 1/5/2023 2104  Gross per 24 hour   Intake 250 ml   Output --   Net 250 ml          Vitals:   Vitals:    01/06/23 1114   BP:    Pulse:    Resp:    Temp: 98.2 °F (36.8 °C)   SpO2:        Physical Exam       Constitutional: Well developed, Well nourished, NAD  Neurologic: Alert & oriented x 3, No focal deficits noted. CNs II-XII grossly intact and symmetrical  Psychiatric: Affect normal, Mood normal.  HENT: Normocephalic, Atraumatic,  Eyes: PERRLA, EOMI, No pallor/scleral icterus.   Neck: Normal range of motion, No tenderness, Supple, no bruit  Lymphatic: No cervical lymphadenopathy noted. Cardiovascular: Regular rate and rhythm, normal S1-S2, No murmurs, gallops or rubs. Thorax & Lungs: CTA bilaterally, No respiratory distress, No wheezing   Abdomen: Soft, BS +ve, no tenderness, no rebound or guarding  Skin: Warm, Dry, No erythema, No rash. Back: No tenderness, No CVA tenderness. Extremities: No edema, No tenderness  Musculoskeletal:. No major deformities noted. Labs       Reviewed, as follows:  Recent Results (from the past 24 hour(s))   POCT Glucose    Collection Time: 01/05/23  4:46 PM   Result Value Ref Range    POC Glucose 152 (H) 70 - 99 mg/dl    Performed on ACCU-CHEK    POCT Glucose    Collection Time: 01/05/23  7:42 PM   Result Value Ref Range    POC Glucose 161 (H) 70 - 99 mg/dl    Performed on ACCU-CHEK    POCT Glucose    Collection Time: 01/06/23  8:24 AM   Result Value Ref Range    POC Glucose 168 (H) 70 - 99 mg/dl    Performed on ACCU-CHEK    POCT Glucose    Collection Time: 01/06/23 11:07 AM   Result Value Ref Range    POC Glucose 139 (H) 70 - 99 mg/dl    Performed on ACCU-CHEK           Body mass index is 23.65 kg/m².  Patient will follow up with PCP for further management as indicated unless otherwise specifically noted above        Garry Merrill MD  1/6/2023 @ 12:18 PM

## 2023-01-06 NOTE — CARE COORDINATION
Case Management Follow up      Chart reviewed and case discussed during huddle and rounds. Pt is admitted day # 2. Unit C-4. Diagnosis and currently status as per MD progress: COPD exacerbation- on IV steroids. Flash pulmonary edema- tx with lasix IV x 3 doses, now discontinued. Respiratory failure- weaning O2 to baseline of 2.5 lpm, now on 3-4 lpm.     Services following:IM    Anticipated Discharge date: pending clinical course. Expected Plan for [address] home with sonKike Trinidad for home o2 2.5lpm. IPTA. Has therapy recs for home PT/OT. Pt is agreeable and has no preference. [facility]. Pre cert needed: n/a    Potential Barriers:none    RN CM will continue to follow Pt clinical course for DCP needs.

## 2023-01-06 NOTE — PROGRESS NOTES
Physical Therapy  [address]  Daily Treatment Note  NAME: Madina Eaton  : 1940  MRN: [id number]    Date of Service: 2023    Discharge Recommendations:  24 hour supervision or assist, Home with Home health PT   PT Equipment Recommendations  Equipment Needed: No  [facility] 6 Clicks Inpatient Mobility:  AM-PAC Mobility Inpatient   How much difficulty turning over in bed?: None  How much difficulty sitting down on / standing up from a chair with arms?: A Little  How much difficulty moving from lying on back to sitting on side of bed?: None  How much help from another person moving to and from a bed to a chair?: A Little  How much help from another person needed to walk in hospital room?: A Little  How much help from another person for climbing 3-5 steps with a railing?: A Little  AM-PAC Inpatient Mobility Raw Score : 20  AM-PAC Inpatient T-Scale Score : 47.67  Mobility Inpatient CMS 0-100% Score: 35.83  Mobility Inpatient CMS G-Code Modifier : CJ    Patient Diagnosis(es): The primary encounter diagnosis was COPD exacerbation ([facility] [address] Diagnoses of Acute on chronic respiratory failure with hypoxia (HCC), ZENDEJAS (dyspnea on exertion), Acute kidney injury ([facility] [address] and Transient hypotension were also pertinent to this visit. Assessment   Assessment: Pt currently requires sba/cga A for transfers and ambulation without device. Pt would benefit from continued skilled PT to address these limitations. Recommend home with 24hr supervision and home PT at discharge. Activity Tolerance: Patient tolerated evaluation without incident;Patient limited by endurance  Equipment Needed: No     Plan    Physcial Therapy Plan  General Plan: 3-5 times per week  Current Treatment Recommendations: Strengthening;Balance training;Functional mobility training;Gait training;Home exercise program;Safety education & training;Patient/Caregiver education & training;Transfer training; Endurance training; Therapeutic activities     Restrictions  Restrictions/Precautions  Restrictions/Precautions: Fall Risk  Position Activity Restriction  Other position/activity restrictions: 2L O2     Subjective    Subjective  Subjective: Pt agreeable to PT with encouragement. Pt c/o tremors and nausea. Pain: Rpts pain in bilateral feet and hands, unrated. Rpt'd brief chest pain in bed but stated it went away before standing and walking activities; RN made aware. Orientation  Overall Orientation Status: Within Functional Limits     Objective   Vitals  Heart Rate: 67  BP: (!) 124/57  BP Location: Right upper arm  MAP (Calculated): 79  SpO2: 99 % (4 L)  O2 Device: Nasal cannula  Bed Mobility Training  Bed Mobility Training: Yes  Interventions: Verbal cues; Safety awareness training  Supine to Sit: Modified independent  Balance  Sitting: Intact  Transfer Training  Transfer Training: Yes  Sit to Stand: Stand-by assistance; Additional time  Stand to Sit: Stand-by assistance; Additional time  Toilet Transfer: Stand-by assistance  Gait Training  Gait Training: Yes  Gait  Overall Level of Assistance: Contact-guard assistance  Base of Support: Widened  Speed/Harika: Accelerated  Gait Abnormalities: Decreased step clearance; Path deviations  Distance (ft): 25 Feet (15 ft)  Assistive Device: Gait belt (no AD)     PT Exercises  Exercise Treatment: performed BLE TE 10X EACH: AP,  GS, HS, HIP ABD, LAQ     Safety Devices  Type of Devices: Gait belt;Nurse notified; Left in chair;Chair alarm in place;Call light within reach; Telesitter in use       Goals  Short Term Goals  Time Frame for Short Term Goals: 7 days (1/12/23)  Short Term Goal 1: Pt will perform transfers mod I  -1/06 sba  Short Term Goal 2: Pt will ambulate 50 ft without device with supervision   -1/06 25' NAD cag  Short Term Goal 3: Pt will meet 3/5 CHF goals   -1/06 on-going  Short Term Goal 4: By 1/8/23, pt will tolerate 15 reps of LE ther ex for improved strength and activity tolerance   -1/06 progressing  Patient Goals   Patient Goals : \"to go back home\"    Education  Patient Education  Education Given To: Patient  Education Provided: Role of Therapy;Plan of Care;Family Education; Energy Conservation  Education Provided Comments: safety with mobility and use of call light; educated on CHF packet  Education Method: Demonstration;Verbal  Barriers to Learning: Vision  Education Outcome: Verbalized understanding;Continued education needed    Therapy Time   Individual Concurrent Group Co-treatment   Time In 1020         Time Out 1100         Minutes 40         Timed Code Treatment Minutes: Oneal Marin

## 2023-01-07 LAB
ANION GAP SERPL CALCULATED.3IONS-SCNC: 12 MMOL/L (ref 3–16)
BASOPHILS ABSOLUTE: 0 K/UL (ref 0–0.2)
BASOPHILS RELATIVE PERCENT: 0.3 %
BUN BLDV-MCNC: 55 MG/DL (ref 7–20)
CALCIUM SERPL-MCNC: 9.7 MG/DL (ref 8.3–10.6)
CHLORIDE BLD-SCNC: 100 MMOL/L (ref 99–110)
CO2: 28 MMOL/L (ref 21–32)
CREAT SERPL-MCNC: 1.7 MG/DL (ref 0.6–1.2)
EOSINOPHILS ABSOLUTE: 0 K/UL (ref 0–0.6)
EOSINOPHILS RELATIVE PERCENT: 0.5 %
GFR SERPL CREATININE-BSD FRML MDRD: 30 ML/MIN/{1.73_M2}
GLUCOSE BLD-MCNC: 104 MG/DL (ref 70–99)
GLUCOSE BLD-MCNC: 141 MG/DL (ref 70–99)
GLUCOSE BLD-MCNC: 182 MG/DL (ref 70–99)
GLUCOSE BLD-MCNC: 96 MG/DL (ref 70–99)
GLUCOSE BLD-MCNC: 98 MG/DL (ref 70–99)
HCT VFR BLD CALC: 39.5 % (ref 36–48)
HEMOGLOBIN: 12.5 G/DL (ref 12–16)
LV EF: 40 %
LVEF MODALITY: NORMAL
LYMPHOCYTES ABSOLUTE: 2.2 K/UL (ref 1–5.1)
LYMPHOCYTES RELATIVE PERCENT: 25.5 %
MCH RBC QN AUTO: 29.9 PG (ref 26–34)
MCHC RBC AUTO-ENTMCNC: 31.7 G/DL (ref 31–36)
MCV RBC AUTO: 94.4 FL (ref 80–100)
MONOCYTES ABSOLUTE: 0.4 K/UL (ref 0–1.3)
MONOCYTES RELATIVE PERCENT: 5 %
NEUTROPHILS ABSOLUTE: 6 K/UL (ref 1.7–7.7)
NEUTROPHILS RELATIVE PERCENT: 68.7 %
PDW BLD-RTO: 13.9 % (ref 12.4–15.4)
PERFORMED ON: ABNORMAL
PERFORMED ON: NORMAL
PLATELET # BLD: 250 K/UL (ref 135–450)
PMV BLD AUTO: 7.6 FL (ref 5–10.5)
POTASSIUM SERPL-SCNC: 4.4 MMOL/L (ref 3.5–5.1)
RBC # BLD: 4.18 M/UL (ref 4–5.2)
SODIUM BLD-SCNC: 140 MMOL/L (ref 136–145)
TROPONIN: 0.02 NG/ML
TROPONIN: 0.03 NG/ML
TROPONIN: 0.05 NG/ML
WBC # BLD: 8.7 K/UL (ref 4–11)

## 2023-01-07 PROCEDURE — 94640 AIRWAY INHALATION TREATMENT: CPT

## 2023-01-07 PROCEDURE — 80048 BASIC METABOLIC PNL TOTAL CA: CPT

## 2023-01-07 PROCEDURE — 93306 TTE W/DOPPLER COMPLETE: CPT

## 2023-01-07 PROCEDURE — 93005 ELECTROCARDIOGRAM TRACING: CPT | Performed by: INTERNAL MEDICINE

## 2023-01-07 PROCEDURE — 6370000000 HC RX 637 (ALT 250 FOR IP): Performed by: HOSPITALIST

## 2023-01-07 PROCEDURE — 36415 COLL VENOUS BLD VENIPUNCTURE: CPT

## 2023-01-07 PROCEDURE — 6370000000 HC RX 637 (ALT 250 FOR IP): Performed by: INTERNAL MEDICINE

## 2023-01-07 PROCEDURE — 94761 N-INVAS EAR/PLS OXIMETRY MLT: CPT

## 2023-01-07 PROCEDURE — 84484 ASSAY OF TROPONIN QUANT: CPT

## 2023-01-07 PROCEDURE — 2060000000 HC ICU INTERMEDIATE R&B

## 2023-01-07 PROCEDURE — 2700000000 HC OXYGEN THERAPY PER DAY

## 2023-01-07 PROCEDURE — 6360000002 HC RX W HCPCS: Performed by: HOSPITALIST

## 2023-01-07 PROCEDURE — 2580000003 HC RX 258: Performed by: HOSPITALIST

## 2023-01-07 PROCEDURE — 85025 COMPLETE CBC W/AUTO DIFF WBC: CPT

## 2023-01-07 RX ORDER — PANTOPRAZOLE SODIUM 40 MG/1
40 TABLET, DELAYED RELEASE ORAL
Status: DISCONTINUED | OUTPATIENT
Start: 2023-01-07 | End: 2023-01-08

## 2023-01-07 RX ORDER — CALCIUM CARBONATE 200(500)MG
500 TABLET,CHEWABLE ORAL 3 TIMES DAILY PRN
Status: DISCONTINUED | OUTPATIENT
Start: 2023-01-07 | End: 2023-01-08

## 2023-01-07 RX ADMIN — SERTRALINE HYDROCHLORIDE 50 MG: 50 TABLET ORAL at 09:41

## 2023-01-07 RX ADMIN — BUSPIRONE HYDROCHLORIDE 5 MG: 5 TABLET ORAL at 09:41

## 2023-01-07 RX ADMIN — APIXABAN 2.5 MG: 2.5 TABLET, FILM COATED ORAL at 09:41

## 2023-01-07 RX ADMIN — ALBUTEROL SULFATE 2.5 MG: 2.5 SOLUTION RESPIRATORY (INHALATION) at 20:40

## 2023-01-07 RX ADMIN — SODIUM CHLORIDE, PRESERVATIVE FREE 10 ML: 5 INJECTION INTRAVENOUS at 20:27

## 2023-01-07 RX ADMIN — ACETAMINOPHEN 325MG 650 MG: 325 TABLET ORAL at 01:26

## 2023-01-07 RX ADMIN — TIOTROPIUM BROMIDE INHALATION SPRAY 2 PUFF: 3.12 SPRAY, METERED RESPIRATORY (INHALATION) at 08:28

## 2023-01-07 RX ADMIN — PANTOPRAZOLE SODIUM 40 MG: 40 TABLET, DELAYED RELEASE ORAL at 09:41

## 2023-01-07 RX ADMIN — METOPROLOL TARTRATE 25 MG: 25 TABLET, FILM COATED ORAL at 20:28

## 2023-01-07 RX ADMIN — ALBUTEROL SULFATE 2.5 MG: 2.5 SOLUTION RESPIRATORY (INHALATION) at 12:55

## 2023-01-07 RX ADMIN — ACETAMINOPHEN 325MG 650 MG: 325 TABLET ORAL at 20:33

## 2023-01-07 RX ADMIN — BUSPIRONE HYDROCHLORIDE 5 MG: 5 TABLET ORAL at 15:46

## 2023-01-07 RX ADMIN — GUAIFENESIN 600 MG: 600 TABLET, EXTENDED RELEASE ORAL at 20:35

## 2023-01-07 RX ADMIN — GUAIFENESIN 600 MG: 600 TABLET, EXTENDED RELEASE ORAL at 09:41

## 2023-01-07 RX ADMIN — METOPROLOL TARTRATE 25 MG: 25 TABLET, FILM COATED ORAL at 09:42

## 2023-01-07 RX ADMIN — Medication 2 PUFF: at 08:28

## 2023-01-07 RX ADMIN — APIXABAN 2.5 MG: 2.5 TABLET, FILM COATED ORAL at 20:28

## 2023-01-07 RX ADMIN — ROFLUMILAST 500 MCG: 500 TABLET ORAL at 09:46

## 2023-01-07 RX ADMIN — FUROSEMIDE 20 MG: 10 INJECTION, SOLUTION INTRAMUSCULAR; INTRAVENOUS at 09:42

## 2023-01-07 RX ADMIN — ALBUTEROL SULFATE 2.5 MG: 2.5 SOLUTION RESPIRATORY (INHALATION) at 15:56

## 2023-01-07 RX ADMIN — DOXYCYCLINE HYCLATE 100 MG: 100 TABLET, COATED ORAL at 01:19

## 2023-01-07 RX ADMIN — PANTOPRAZOLE SODIUM 40 MG: 40 TABLET, DELAYED RELEASE ORAL at 15:46

## 2023-01-07 RX ADMIN — ATORVASTATIN CALCIUM 40 MG: 40 TABLET, FILM COATED ORAL at 20:28

## 2023-01-07 RX ADMIN — PREDNISONE 40 MG: 20 TABLET ORAL at 09:41

## 2023-01-07 RX ADMIN — CLOPIDOGREL BISULFATE 75 MG: 75 TABLET ORAL at 09:42

## 2023-01-07 RX ADMIN — FERROUS SULFATE TAB 325 MG (65 MG ELEMENTAL FE) 325 MG: 325 (65 FE) TAB at 09:46

## 2023-01-07 RX ADMIN — BUSPIRONE HYDROCHLORIDE 5 MG: 5 TABLET ORAL at 20:28

## 2023-01-07 RX ADMIN — ANTACID TABLETS 500 MG: 500 TABLET, CHEWABLE ORAL at 12:23

## 2023-01-07 RX ADMIN — ANTACID TABLETS 500 MG: 500 TABLET, CHEWABLE ORAL at 20:33

## 2023-01-07 RX ADMIN — DOXYCYCLINE HYCLATE 100 MG: 100 TABLET, COATED ORAL at 12:48

## 2023-01-07 RX ADMIN — Medication 400 MG: at 09:41

## 2023-01-07 RX ADMIN — ALBUTEROL SULFATE 2.5 MG: 2.5 SOLUTION RESPIRATORY (INHALATION) at 08:28

## 2023-01-07 RX ADMIN — Medication 2 PUFF: at 20:40

## 2023-01-07 RX ADMIN — SODIUM CHLORIDE, PRESERVATIVE FREE 10 ML: 5 INJECTION INTRAVENOUS at 09:42

## 2023-01-07 ASSESSMENT — PAIN SCALES - GENERAL
PAINLEVEL_OUTOF10: 3
PAINLEVEL_OUTOF10: 0
PAINLEVEL_OUTOF10: 3

## 2023-01-07 ASSESSMENT — PAIN DESCRIPTION - DESCRIPTORS
DESCRIPTORS: ACHING
DESCRIPTORS: ACHING;DISCOMFORT

## 2023-01-07 ASSESSMENT — PAIN DESCRIPTION - LOCATION
LOCATION: HEAD
LOCATION: HEAD

## 2023-01-07 NOTE — PROGRESS NOTES
01/06/23 2038   NIV Type   Skin Protection for O2 Device Yes   Location Nose;Other (Comment)  (nose and chin)   NIV Started/Stopped On   Equipment Type v60   Mode (S)  Bilevel   Settings/Measurements   IPAP 12 cmH20   CPAP/EPAP 6 cmH2O   Rate Ordered 16   Resp 26   FiO2  30 %   I Time/ I Time % 0.9 s   Minute Volume (L/min) 12.8 Liters   Mask Leak (lpm) 8 lpm   Comfort Level Good   Using Accessory Muscles No

## 2023-01-07 NOTE — PROGRESS NOTES
Physician Progress Note      PATIENT:               Jc Villegas  CSN #:                  [id number]  :                       1940  ADMIT DATE:       2023 11:35 AM  [address] [place] DATE:  RESPONDING  PROVIDER #:        Cyndi Dumont MD          QUERY TEXT:    Per H&P-  \"Acute on chronic respiratory failure with hypoxia and   hypercapnia/Acute decompensated heart failure/COPD\"   PN--  \"Presumed flash   pulmonary edema: Treated with IV Lasix x3 doses. Clinically resolved\". If possible, please document in progress notes and discharge summary if you   are evaluating and /or treating any of the following: The medical record reflects the following:  Risk Factors: HX of CHF, HTN, COPD chronic oxygen use  Clinical Indicators: BNP-1728 on admission. H&P- \"Intermittent chest pain with   palpitation; increased BLE peripheral edema with orthopnea or PND\". PN-   -\"Presumed flash pulmonary edema: From respiratory distress. ? Treatment:  admitted to telemetry floor for continuous monitoring during   stay-BNP elevated 1728; initial troponin 0.05, and will be trended   overnight-EKG obtained in ED On limited treatment with IV Lasix, reassess   volume status tomorrow morning. Patient appears clinically better. Checking   2D echo for current cardiac function and structure. Thank-You, Ameena Salomon RN, BSN, CCDS  Options provided:  -- Noncardiogenic acute flash pulmonary edema due to respiratory distress,   acute component of CHF ruled out  -- Acute on chronic CHF confirmed POA, please specify type of CHF, please   specify type of CHF.  -- Other - I will add my own diagnosis  -- Disagree - Not applicable / Not valid  -- Disagree - Clinically unable to determine / Unknown  -- Refer to Clinical Documentation Reviewer    PROVIDER RESPONSE TEXT:    This patient has noncardiogenic acute pulmonary edema due to respiratory   distress, acute component of CHF ruled out.     Query created by: Bawte on 2023 3:55 PM      Electronically signed by:  Stephanie Esparza MD 1/7/2023 7:52 AM

## 2023-01-07 NOTE — FLOWSHEET NOTE
Called lab at 1330 regarding 1145 troponin not being done. Lab stated they will come now to get. Troponin still not resulted. Will call lab again.

## 2023-01-07 NOTE — PROGRESS NOTES
INTERNAL MED PROGRESS NOTE      Subjective     Patient c/o of heart burn/lower chest pain this am. Denied any other concerns otherwise      Assessment/Plan      --GERD/Chest pain: +epigastric pain on palpation for me. Placed on PPI BID, prn Tums. Check serial trops, check EKG. 2D Echo pending. --Acute COPD exacerbation:Clinically improving. Continue scheduled steroid, scheduled and as needed DuoNebs. Supplemental oxygen. --Presumed flash pulmonary edema: Treated with IV Lasix x3 doses. Clinically resolved. --Acute hypoxic and hypercapnic respiratory failure: Was on BiPAP overnight, currently weaned off. Still requiring 2-3 L this a.m. (baseline requirement of 2.5 L)    --Paroxysmal A. fib: Fairly rate controlled at this point. Continue outpatient medication as ordered. Patient is on Eliquis for anticoagulation    --DM type II: Continue subcu insulin regimen including long-acting and short acting insulin as ordered, continue SSI protocol for more adequate glycemic control.    --HTN, controlled, cont home meds as ordered     --GERD: Cont home med     --HLD: Continue current statin therapy      --Anxiety/Depression: Patient seems very anxious shakiness appears to be more related to this than the effect of steroid. Will place on as needed Ativan         DVT prophylaxis: Heparin/Lovenox      Onel Chau MD  1/7/2023 @ 7:57 AM      Objective               Intake/Output Summary (Last 24 hours) at 1/7/2023 0757  Last data filed at 1/6/2023 1800  Gross per 24 hour   Intake --   Output 400 ml   Net -400 ml          Vitals:   Vitals:    01/07/23 0646   BP: (!) 102/59   Pulse: 68   Resp: 18   Temp: 97.5 °F (36.4 °C)   SpO2: 97%       Physical Exam       Constitutional: Well developed, Well nourished, NAD  Neurologic: Alert & oriented x 3, No focal deficits noted.  CNs II-XII grossly intact and symmetrical  Psychiatric: Affect normal, Mood normal.  HENT: Normocephalic, Atraumatic,  Eyes: PERRLA, EOMI, No pallor/scleral icterus. Neck: Normal range of motion, No tenderness, Supple, no bruit  Lymphatic: No cervical lymphadenopathy noted. Cardiovascular: Regular rate and rhythm, normal S1-S2, No murmurs, gallops or rubs. Thorax & Lungs: CTA bilaterally, No respiratory distress, No wheezing   Abdomen: Soft, BS +ve, no tenderness, no rebound or guarding  Skin: Warm, Dry, No erythema, No rash. Back: No tenderness, No CVA tenderness. Extremities: No edema, No tenderness  Musculoskeletal:. No major deformities noted. Labs       Reviewed, as follows:  Recent Results (from the past 24 hour(s))   POCT Glucose    Collection Time: 01/06/23  8:24 AM   Result Value Ref Range    POC Glucose 168 (H) 70 - 99 mg/dl    Performed on ACCU-CHEK    POCT Glucose    Collection Time: 01/06/23 11:07 AM   Result Value Ref Range    POC Glucose 139 (H) 70 - 99 mg/dl    Performed on ACCU-CHEK    POCT Glucose    Collection Time: 01/06/23  4:27 PM   Result Value Ref Range    POC Glucose 87 70 - 99 mg/dl    Performed on ACCU-CHEK    POCT Glucose    Collection Time: 01/06/23  7:51 PM   Result Value Ref Range    POC Glucose 106 (H) 70 - 99 mg/dl    Performed on ACCU-CHEK    POCT Glucose    Collection Time: 01/07/23  7:48 AM   Result Value Ref Range    POC Glucose 96 70 - 99 mg/dl    Performed on ACCU-CHEK           Body mass index is 25.01 kg/m².  Patient will follow up with PCP for further management as indicated unless otherwise specifically noted above        Justus Strickland MD  1/7/2023 @ 7:57 AM

## 2023-01-08 LAB
ANION GAP SERPL CALCULATED.3IONS-SCNC: 10 MMOL/L (ref 3–16)
BASOPHILS ABSOLUTE: 0 K/UL (ref 0–0.2)
BASOPHILS RELATIVE PERCENT: 0.4 %
BLOOD CULTURE, ROUTINE: NORMAL
BUN BLDV-MCNC: 55 MG/DL (ref 7–20)
CALCIUM SERPL-MCNC: 10.1 MG/DL (ref 8.3–10.6)
CHLORIDE BLD-SCNC: 102 MMOL/L (ref 99–110)
CO2: 28 MMOL/L (ref 21–32)
CREAT SERPL-MCNC: 1.5 MG/DL (ref 0.6–1.2)
CULTURE, BLOOD 2: NORMAL
EKG ATRIAL RATE: 70 BPM
EKG DIAGNOSIS: NORMAL
EKG P AXIS: 76 DEGREES
EKG P-R INTERVAL: 150 MS
EKG Q-T INTERVAL: 384 MS
EKG QRS DURATION: 94 MS
EKG QTC CALCULATION (BAZETT): 442 MS
EKG R AXIS: -63 DEGREES
EKG T AXIS: 55 DEGREES
EKG VENTRICULAR RATE: 80 BPM
EOSINOPHILS ABSOLUTE: 0 K/UL (ref 0–0.6)
EOSINOPHILS RELATIVE PERCENT: 0.1 %
GFR SERPL CREATININE-BSD FRML MDRD: 35 ML/MIN/{1.73_M2}
GLUCOSE BLD-MCNC: 108 MG/DL (ref 70–99)
GLUCOSE BLD-MCNC: 114 MG/DL (ref 70–99)
GLUCOSE BLD-MCNC: 114 MG/DL (ref 70–99)
GLUCOSE BLD-MCNC: 137 MG/DL (ref 70–99)
GLUCOSE BLD-MCNC: 97 MG/DL (ref 70–99)
HCT VFR BLD CALC: 41.1 % (ref 36–48)
HEMOGLOBIN: 13.5 G/DL (ref 12–16)
LYMPHOCYTES ABSOLUTE: 1.6 K/UL (ref 1–5.1)
LYMPHOCYTES RELATIVE PERCENT: 14 %
MCH RBC QN AUTO: 30.9 PG (ref 26–34)
MCHC RBC AUTO-ENTMCNC: 32.9 G/DL (ref 31–36)
MCV RBC AUTO: 93.9 FL (ref 80–100)
MONOCYTES ABSOLUTE: 0.6 K/UL (ref 0–1.3)
MONOCYTES RELATIVE PERCENT: 5.6 %
NEUTROPHILS ABSOLUTE: 8.9 K/UL (ref 1.7–7.7)
NEUTROPHILS RELATIVE PERCENT: 79.9 %
PDW BLD-RTO: 13.8 % (ref 12.4–15.4)
PERFORMED ON: ABNORMAL
PERFORMED ON: NORMAL
PLATELET # BLD: 272 K/UL (ref 135–450)
PMV BLD AUTO: 7.4 FL (ref 5–10.5)
POTASSIUM SERPL-SCNC: 3.6 MMOL/L (ref 3.5–5.1)
RBC # BLD: 4.38 M/UL (ref 4–5.2)
SODIUM BLD-SCNC: 140 MMOL/L (ref 136–145)
TROPONIN: 0.02 NG/ML
TROPONIN: 0.03 NG/ML
TROPONIN: 0.04 NG/ML
TROPONIN: 0.05 NG/ML
WBC # BLD: 11.1 K/UL (ref 4–11)

## 2023-01-08 PROCEDURE — 94761 N-INVAS EAR/PLS OXIMETRY MLT: CPT

## 2023-01-08 PROCEDURE — 94660 CPAP INITIATION&MGMT: CPT

## 2023-01-08 PROCEDURE — C9113 INJ PANTOPRAZOLE SODIUM, VIA: HCPCS | Performed by: INTERNAL MEDICINE

## 2023-01-08 PROCEDURE — 6360000002 HC RX W HCPCS: Performed by: INTERNAL MEDICINE

## 2023-01-08 PROCEDURE — 2060000000 HC ICU INTERMEDIATE R&B

## 2023-01-08 PROCEDURE — 6360000002 HC RX W HCPCS: Performed by: HOSPITALIST

## 2023-01-08 PROCEDURE — 2500000003 HC RX 250 WO HCPCS: Performed by: INTERNAL MEDICINE

## 2023-01-08 PROCEDURE — 93010 ELECTROCARDIOGRAM REPORT: CPT | Performed by: INTERNAL MEDICINE

## 2023-01-08 PROCEDURE — 6370000000 HC RX 637 (ALT 250 FOR IP): Performed by: HOSPITALIST

## 2023-01-08 PROCEDURE — 36415 COLL VENOUS BLD VENIPUNCTURE: CPT

## 2023-01-08 PROCEDURE — 80048 BASIC METABOLIC PNL TOTAL CA: CPT

## 2023-01-08 PROCEDURE — 6370000000 HC RX 637 (ALT 250 FOR IP): Performed by: INTERNAL MEDICINE

## 2023-01-08 PROCEDURE — 2580000003 HC RX 258: Performed by: HOSPITALIST

## 2023-01-08 PROCEDURE — 85025 COMPLETE CBC W/AUTO DIFF WBC: CPT

## 2023-01-08 PROCEDURE — 84484 ASSAY OF TROPONIN QUANT: CPT

## 2023-01-08 PROCEDURE — 94640 AIRWAY INHALATION TREATMENT: CPT

## 2023-01-08 PROCEDURE — 2700000000 HC OXYGEN THERAPY PER DAY

## 2023-01-08 RX ORDER — CALCIUM CARBONATE 200(500)MG
500 TABLET,CHEWABLE ORAL EVERY 6 HOURS PRN
Status: DISCONTINUED | OUTPATIENT
Start: 2023-01-08 | End: 2023-01-15 | Stop reason: HOSPADM

## 2023-01-08 RX ORDER — PANTOPRAZOLE SODIUM 40 MG/10ML
40 INJECTION, POWDER, LYOPHILIZED, FOR SOLUTION INTRAVENOUS 2 TIMES DAILY
Status: DISCONTINUED | OUTPATIENT
Start: 2023-01-08 | End: 2023-01-15

## 2023-01-08 RX ORDER — PANTOPRAZOLE SODIUM 40 MG/10ML
40 INJECTION, POWDER, LYOPHILIZED, FOR SOLUTION INTRAVENOUS 2 TIMES DAILY
Status: DISCONTINUED | OUTPATIENT
Start: 2023-01-08 | End: 2023-01-08

## 2023-01-08 RX ORDER — FAMOTIDINE 10 MG/ML
20 INJECTION, SOLUTION INTRAVENOUS ONCE
Status: COMPLETED | OUTPATIENT
Start: 2023-01-08 | End: 2023-01-08

## 2023-01-08 RX ORDER — METHYLPREDNISOLONE SODIUM SUCCINATE 40 MG/ML
40 INJECTION, POWDER, LYOPHILIZED, FOR SOLUTION INTRAMUSCULAR; INTRAVENOUS DAILY
Status: DISCONTINUED | OUTPATIENT
Start: 2023-01-08 | End: 2023-01-12

## 2023-01-08 RX ADMIN — ACETAMINOPHEN 325MG 650 MG: 325 TABLET ORAL at 20:20

## 2023-01-08 RX ADMIN — Medication 2 PUFF: at 19:48

## 2023-01-08 RX ADMIN — ATORVASTATIN CALCIUM 40 MG: 40 TABLET, FILM COATED ORAL at 20:20

## 2023-01-08 RX ADMIN — SERTRALINE HYDROCHLORIDE 50 MG: 50 TABLET ORAL at 09:42

## 2023-01-08 RX ADMIN — BUSPIRONE HYDROCHLORIDE 5 MG: 5 TABLET ORAL at 14:27

## 2023-01-08 RX ADMIN — TIOTROPIUM BROMIDE INHALATION SPRAY 2 PUFF: 3.12 SPRAY, METERED RESPIRATORY (INHALATION) at 08:32

## 2023-01-08 RX ADMIN — BUSPIRONE HYDROCHLORIDE 5 MG: 5 TABLET ORAL at 09:42

## 2023-01-08 RX ADMIN — ROFLUMILAST 500 MCG: 500 TABLET ORAL at 09:48

## 2023-01-08 RX ADMIN — CLOPIDOGREL BISULFATE 75 MG: 75 TABLET ORAL at 09:42

## 2023-01-08 RX ADMIN — ALBUTEROL SULFATE 2.5 MG: 2.5 SOLUTION RESPIRATORY (INHALATION) at 11:48

## 2023-01-08 RX ADMIN — Medication 2 PUFF: at 08:32

## 2023-01-08 RX ADMIN — LORAZEPAM 0.5 MG: 0.5 TABLET ORAL at 20:20

## 2023-01-08 RX ADMIN — ANTACID TABLETS 500 MG: 500 TABLET, CHEWABLE ORAL at 04:56

## 2023-01-08 RX ADMIN — BUSPIRONE HYDROCHLORIDE 5 MG: 5 TABLET ORAL at 20:20

## 2023-01-08 RX ADMIN — APIXABAN 2.5 MG: 2.5 TABLET, FILM COATED ORAL at 09:42

## 2023-01-08 RX ADMIN — DOXYCYCLINE HYCLATE 100 MG: 100 TABLET, COATED ORAL at 14:27

## 2023-01-08 RX ADMIN — METHYLPREDNISOLONE SODIUM SUCCINATE 40 MG: 40 INJECTION, POWDER, FOR SOLUTION INTRAMUSCULAR; INTRAVENOUS at 09:37

## 2023-01-08 RX ADMIN — ANTACID TABLETS 500 MG: 500 TABLET, CHEWABLE ORAL at 17:27

## 2023-01-08 RX ADMIN — METOPROLOL TARTRATE 25 MG: 25 TABLET, FILM COATED ORAL at 20:20

## 2023-01-08 RX ADMIN — GUAIFENESIN 600 MG: 600 TABLET, EXTENDED RELEASE ORAL at 20:20

## 2023-01-08 RX ADMIN — DOXYCYCLINE HYCLATE 100 MG: 100 TABLET, COATED ORAL at 00:29

## 2023-01-08 RX ADMIN — Medication 400 MG: at 09:41

## 2023-01-08 RX ADMIN — SODIUM CHLORIDE, PRESERVATIVE FREE 10 ML: 5 INJECTION INTRAVENOUS at 20:21

## 2023-01-08 RX ADMIN — PANTOPRAZOLE SODIUM 40 MG: 40 TABLET, DELAYED RELEASE ORAL at 05:02

## 2023-01-08 RX ADMIN — ALBUTEROL SULFATE 2.5 MG: 2.5 SOLUTION RESPIRATORY (INHALATION) at 19:43

## 2023-01-08 RX ADMIN — ALBUTEROL SULFATE 2.5 MG: 2.5 SOLUTION RESPIRATORY (INHALATION) at 08:32

## 2023-01-08 RX ADMIN — FERROUS SULFATE TAB 325 MG (65 MG ELEMENTAL FE) 325 MG: 325 (65 FE) TAB at 09:42

## 2023-01-08 RX ADMIN — METOPROLOL TARTRATE 25 MG: 25 TABLET, FILM COATED ORAL at 09:41

## 2023-01-08 RX ADMIN — PANTOPRAZOLE SODIUM 40 MG: 40 INJECTION, POWDER, FOR SOLUTION INTRAVENOUS at 20:20

## 2023-01-08 RX ADMIN — ALBUTEROL SULFATE 2.5 MG: 2.5 SOLUTION RESPIRATORY (INHALATION) at 16:17

## 2023-01-08 RX ADMIN — GUAIFENESIN 600 MG: 600 TABLET, EXTENDED RELEASE ORAL at 09:41

## 2023-01-08 RX ADMIN — ACETAMINOPHEN 325MG 650 MG: 325 TABLET ORAL at 08:44

## 2023-01-08 RX ADMIN — ONDANSETRON 4 MG: 2 INJECTION INTRAMUSCULAR; INTRAVENOUS at 11:12

## 2023-01-08 RX ADMIN — APIXABAN 2.5 MG: 2.5 TABLET, FILM COATED ORAL at 20:20

## 2023-01-08 RX ADMIN — FAMOTIDINE 20 MG: 10 INJECTION, SOLUTION INTRAVENOUS at 09:39

## 2023-01-08 ASSESSMENT — PAIN DESCRIPTION - LOCATION
LOCATION: HEAD
LOCATION: ABDOMEN

## 2023-01-08 ASSESSMENT — PAIN - FUNCTIONAL ASSESSMENT: PAIN_FUNCTIONAL_ASSESSMENT: ACTIVITIES ARE NOT PREVENTED

## 2023-01-08 ASSESSMENT — PAIN DESCRIPTION - DESCRIPTORS
DESCRIPTORS: ACHING
DESCRIPTORS: ACHING

## 2023-01-08 ASSESSMENT — PAIN SCALES - GENERAL
PAINLEVEL_OUTOF10: 0
PAINLEVEL_OUTOF10: 2
PAINLEVEL_OUTOF10: 4

## 2023-01-08 ASSESSMENT — PAIN DESCRIPTION - ORIENTATION: ORIENTATION: RIGHT

## 2023-01-08 NOTE — PROGRESS NOTES
01/08/23 1621   RT Protocol   History Pulmonary Disease 2   Respiratory pattern 4   Breath sounds 4   Cough 1   Indications for Bronchodilator Therapy Wheezing associated with pulm disorder   Bronchodilator Assessment Score 11

## 2023-01-08 NOTE — CONSULTS
Gastroenterology Consult Note    Patient:   Jess Abdullahi   :       Facility:     [address]  [address] [address]   Referring/PCP: Dolores Trimble MD  Date:     2023  Consultant:   Pavan Denton DO    Subjective: This 81 y.o. female was admitted 2023 with a diagnosis of \"ZENDEJAS (dyspnea on exertion) [R06.09]  COPD exacerbation (HCC) [J44.1]  Acute kidney injury ([address] [N17.9]  Transient hypotension [I95.9]  Acute on chronic respiratory failure with hypoxia (HCC) [J96.21]  Acute on chronic respiratory failure with hypoxia and hypercapnia (HCC) [I61.51, J96.22]\" and is seen in consultation regarding odynophagia    81-year-old female with history of diabetes hypertension GERD hyperlipidemia anxiety and depression. who presented the ER not feeling well for 3 days with sinus congestion shortness of breath and chest congestion. She has a history of COPD and is on home oxygen. She was diagnosed with acute COPD the exacerbation and presumed flash pulmonary edema. She was initially placed on BiPAP and weaned off. She was weaned down to her baseline oxygen requirement. The patient complains of epigastric pain which is exacerbated swallowing. She also states that she has nausea and vomiting she would like endoscopic work-up for further evaluation.     Past Medical History:   Diagnosis Date    NADJA (acute kidney injury) ([facility] [address]     Asthma     CAD (coronary artery disease)     CHF (congestive heart failure) ([facility])     unsure    Chronic anxiety     COPD (chronic obstructive pulmonary disease) ([facility])     GERD (gastroesophageal reflux disease) 2021    History of blood transfusion     Hyperlipidemia     Hypertension     MRSA (methicillin resistant staph aureus) culture positive 2019    + resp cx    OA (osteoarthritis) 2014    Thyroid disease      Past Surgical History:   Procedure Laterality Date    BRONCHOSCOPY  2019    Dr. Wesley Ren - w/BAL    CARDIAC CATHETERIZATION  09/05/2019    Dr. Adrian Walters N/A 2/24/2020    COLONOSCOPY DIAGNOSTIC performed by Yvan Dyson DO at [address] [facility] N/A 10/22/2021    COLONOSCOPY POLYPECTOMY SNARE/COLD BIOPSY performed by Kailey Dwyer MD at [address] [facility] N/A 9/19/2019    OFF PUMP CORONARY ARTERY BYPASS GRAFTING X2, INTERNAL MAMMARY ARTERY, SAPHENOUS VEIN GRAFT performed by Jd Pinto MD at [address] CATH  9/20/2021    IR MIDLINE CATH 9/20/2021 [facility] SPECIAL PROCEDURES    MASTECTOMY, PARTIAL Left     SIGMOIDOSCOPY  02/27/2020    4 bands    SIGMOIDOSCOPY N/A 2/27/2020    FLEX SIG W/ BANDING SIGMOIDOSCOPY DIAGNOSTIC FLEXIBLE performed by Yvan Dyson DO at [address] [facility]:   Social History     Tobacco Use    Smoking status: Former     Packs/day: 0.50     Years: 50.00     Pack years: 25.00     Types: Cigarettes     Quit date: 9/19/2019     Years since quitting: 3.3    Smokeless tobacco: Never    Tobacco comments:     quit 2018   Substance Use Topics    Alcohol use: No     Family:   Family History   Problem Relation Age of Onset    Cancer Mother     Heart Disease Father     Stroke Father     Heart Disease Sister     Stroke Sister        Scheduled Medications:    methylPREDNISolone  40 mg IntraVENous Daily    pantoprazole  40 mg IntraVENous BID    apixaban  2.5 mg Oral BID    busPIRone  5 mg Oral TID    atorvastatin  40 mg Oral Nightly    clopidogrel  75 mg Oral Daily    ferrous sulfate  325 mg Oral Daily with breakfast    guaiFENesin  600 mg Oral BID    magnesium oxide  400 mg Oral Daily    Roflumilast  500 mcg Oral Daily    sertraline  50 mg Oral Daily    metoprolol tartrate  25 mg Oral BID    sodium chloride flush  10 mL IntraVENous 2 times per day    doxycycline hyclate  100 mg Oral Q12H    mometasone-formoterol  2 puff Inhalation BID    tiotropium  2 puff Inhalation Daily    albuterol  2.5 mg Nebulization Q4H WA    insulin lispro  0-8 Units SubCUTAneous TID WC    insulin lispro  0-4 Units SubCUTAneous Nightly     Infusions:    sodium chloride      dextrose       PRN Medications: Benzocaine-Menthol, calcium carbonate, LORazepam, docusate sodium, fluticasone, sodium chloride flush, sodium chloride, senna, polyethylene glycol, acetaminophen **OR** acetaminophen, ondansetron **OR** ondansetron, perflutren lipid microspheres, glucose, dextrose bolus **OR** dextrose bolus, glucagon (rDNA), dextrose  Allergies: Allergies   Allergen Reactions    Latex Other (See Comments)     Burning      Codeine Other (See Comments)     \"hallucinations\"       ROS:   Review of Systems    Objective:     Physical Exam:   Vitals:    01/08/23 1230   BP: 110/68   Pulse: 68   Resp: 20   Temp: 98.2 °F (36.8 °C)   SpO2: 99%     I/O last 3 completed shifts: In: 10 [I.V.:10]  Out: 925 [Urine:925]   General appearance: alert, appears stated age, and cooperative  HEENT: EOMI  Neck: no adenopathy, no carotid bruit, no JVD, supple, symmetrical, trachea midline, and thyroid not enlarged, symmetric, no tenderness/mass/nodules  Lungs: clear to auscultation bilaterally  Heart: regular rate and rhythm, S1, S2 normal, no murmur, click, rub or gallop  Abdomen: soft, non-tender; bowel sounds normal; no masses,  no organomegaly  Extremities: extremities normal, atraumatic, no cyanosis or edema     Lab and Imaging Review   Labs:  CBC:   Recent Labs     01/07/23  0851 01/08/23  0455   WBC 8.7 11.1*   HGB 12.5 13.5   HCT 39.5 41.1   MCV 94.4 93.9    272     BMP:   Recent Labs     01/07/23  0851 01/08/23  0455    140   K 4.4 3.6    102   CO2 28 28   BUN 55* 55*   CREATININE 1.7* 1.5*     LIVER PROFILE: No results for input(s): AST, ALT, LIPASE, PROT, BILIDIR, BILITOT, ALKPHOS in the last 72 hours. Invalid input(s):   AMYLASE,  ALB  PT/INR: No results for input(s): INR in the last 72 hours.    Invalid input(s): PT    IMAGING:  No results found. 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   NPO after midnight      Alyssa Gruber DO  1:28 PM 1/8/2023            [address] [place] [address] 120      [address] [place] [address]     Phone: [phone]     Fax: [phone]

## 2023-01-08 NOTE — PLAN OF CARE
Problem: Discharge Planning  Goal: Discharge to home or other facility with appropriate resources  Outcome: Progressing  Flowsheets (Taken 1/8/2023 0830)  Discharge to home or other facility with appropriate resources:   Identify barriers to discharge with patient and caregiver   Arrange for needed discharge resources and transportation as appropriate   Identify discharge learning needs (meds, wound care, etc)   Arrange for interpreters to assist at discharge as needed   Refer to discharge planning if patient needs post-hospital services based on physician order or complex needs related to functional status, cognitive ability or social support system     Problem: Skin/Tissue Integrity  Goal: Absence of new skin breakdown  Description: 1. Monitor for areas of redness and/or skin breakdown  2. Assess vascular access sites hourly  3. Every 4-6 hours minimum:  Change oxygen saturation probe site  4. Every 4-6 hours:  If on nasal continuous positive airway pressure, respiratory therapy assess nares and determine need for appliance change or resting period.   Outcome: Progressing     Problem: Safety - Adult  Goal: Free from fall injury  Outcome: Progressing     Problem: Chronic Conditions and Co-morbidities  Goal: Patient's chronic conditions and co-morbidity symptoms are monitored and maintained or improved  Outcome: Progressing  Flowsheets (Taken 1/8/2023 0830)  Care Plan - Patient's Chronic Conditions and Co-Morbidity Symptoms are Monitored and Maintained or Improved:   Monitor and assess patient's chronic conditions and comorbid symptoms for stability, deterioration, or improvement   Collaborate with multidisciplinary team to address chronic and comorbid conditions and prevent exacerbation or deterioration   Update acute care plan with appropriate goals if chronic or comorbid symptoms are exacerbated and prevent overall improvement and discharge     Problem: Pain  Goal: Verbalizes/displays adequate comfort level or baseline comfort level  Outcome: Progressing     Problem: Neurosensory - Adult  Goal: Achieves stable or improved neurological status  Outcome: Progressing  Goal: Absence of seizures  Outcome: Progressing  Goal: Remains free of injury related to seizures activity  Outcome: Progressing  Goal: Achieves maximal functionality and self care  Outcome: Progressing     Problem: Respiratory - Adult  Goal: Achieves optimal ventilation and oxygenation  Outcome: Progressing     Problem: Cardiovascular - Adult  Goal: Maintains optimal cardiac output and hemodynamic stability  Outcome: Progressing  Goal: Absence of cardiac dysrhythmias or at baseline  Outcome: Progressing     Problem: Skin/Tissue Integrity - Adult  Goal: Skin integrity remains intact  Outcome: Progressing  Goal: Incisions, wounds, or drain sites healing without S/S of infection  Outcome: Progressing  Goal: Oral mucous membranes remain intact  Outcome: Progressing     Problem: Musculoskeletal - Adult  Goal: Return mobility to safest level of function  Outcome: Progressing  Goal: Maintain proper alignment of affected body part  Outcome: Progressing  Goal: Return ADL status to a safe level of function  Outcome: Progressing     Problem: Gastrointestinal - Adult  Goal: Minimal or absence of nausea and vomiting  Outcome: Progressing  Goal: Maintains or returns to baseline bowel function  Outcome: Progressing  Goal: Maintains adequate nutritional intake  Outcome: Progressing  Goal: Establish and maintain optimal ostomy function  Outcome: Progressing     Problem: Genitourinary - Adult  Goal: Absence of urinary retention  Outcome: Progressing  Goal: Urinary catheter remains patent  Outcome: Progressing     Problem: Infection - Adult  Goal: Absence of infection at discharge  Outcome: Progressing  Goal: Absence of infection during hospitalization  Outcome: Progressing  Goal: Absence of fever/infection during anticipated neutropenic period  Outcome: Progressing Problem: Metabolic/Fluid and Electrolytes - Adult  Goal: Electrolytes maintained within normal limits  Outcome: Progressing  Goal: Hemodynamic stability and optimal renal function maintained  Outcome: Progressing  Goal: Glucose maintained within prescribed range  Outcome: Progressing     Problem: Hematologic - Adult  Goal: Maintains hematologic stability  Outcome: Progressing

## 2023-01-08 NOTE — PROGRESS NOTES
01/07/23 2349   NIV Type   Skin Protection for O2 Device Yes   Location Nose   Equipment Type V60   Mode Bilevel   Mask Type Full face mask   Mask Size Medium   Settings/Measurements   PIP Observed 13 cm H20   IPAP 12 cmH20   CPAP/EPAP 6 cmH2O   Vt (Measured) 430 mL   Resp 22   FiO2  30 %   Minute Volume (L/min) 9 Liters   Mask Leak (lpm) 0 lpm   Comfort Level Good   Using Accessory Muscles No   SpO2 97   Patient's Home Machine No

## 2023-01-08 NOTE — RT PROTOCOL NOTE
RT Inhaler-Nebulizer Bronchodilator Protocol Note    There is a bronchodilator order in the chart from a provider indicating to follow the RT Bronchodilator Protocol and there is an Initiate RT Inhaler-Nebulizer Bronchodilator Protocol order as well (see protocol at bottom of note). CXR Findings:  No results found. The findings from the last RT Protocol Assessment were as follows:   History Pulmonary Disease: Chronic pulmonary disease  Respiratory Pattern: Mild dyspnea at rest, irregular pattern, or RR 21-25 bpm  Breath Sounds: Intermittent or unilateral wheezes  Cough: Strong, productive  Indication for Bronchodilator Therapy: Wheezing associated with pulm disorder  Bronchodilator Assessment Score: 11    Aerosolized bronchodilator medication orders have been revised according to the RT Inhaler-Nebulizer Bronchodilator Protocol below. Respiratory Therapist to perform RT Therapy Protocol Assessment initially then follow the protocol. Repeat RT Therapy Protocol Assessment PRN for score 0-3 or on second treatment, BID, and PRN for scores above 3. No Indications - adjust the frequency to every 6 hours PRN wheezing or bronchospasm, if no treatments needed after 48 hours then discontinue using Per Protocol order mode. If indication present, adjust the RT bronchodilator orders based on the Bronchodilator Assessment Score as indicated below. Use Inhaler orders unless patient has one or more of the following: on home nebulizer, not able to hold breath for 10 seconds, is not alert and oriented, cannot activate and use MDI correctly, or respiratory rate 25 breaths per minute or more, then use the equivalent nebulizer order(s) with same Frequency and PRN reasons based on the score. If a patient is on this medication at home then do not decrease Frequency below that used at home.     0-3 - enter or revise RT bronchodilator order(s) to equivalent RT Bronchodilator order with Frequency of every 4 hours PRN for wheezing or increased work of breathing using Per Protocol order mode. 4-6 - enter or revise RT Bronchodilator order(s) to two equivalent RT bronchodilator orders with one order with BID Frequency and one order with Frequency of every 4 hours PRN wheezing or increased work of breathing using Per Protocol order mode. 7-10 - enter or revise RT Bronchodilator order(s) to two equivalent RT bronchodilator orders with one order with TID Frequency and one order with Frequency of every 4 hours PRN wheezing or increased work of breathing using Per Protocol order mode. 11-13 - enter or revise RT Bronchodilator order(s) to one equivalent RT bronchodilator order with QID Frequency and an Albuterol order with Frequency of every 4 hours PRN wheezing or increased work of breathing using Per Protocol order mode. Greater than 13 - enter or revise RT Bronchodilator order(s) to one equivalent RT bronchodilator order with every 4 hours Frequency and an Albuterol order with Frequency of every 2 hours PRN wheezing or increased work of breathing using Per Protocol order mode. RT to enter RT Home Evaluation for COPD & MDI Assessment order using Per Protocol order mode.     Electronically signed by Elkin Sadler RCP on 1/8/2023 at 4:22 PM

## 2023-01-08 NOTE — DISCHARGE INSTR - COC
Continuity of Care Form    Patient Name: Bonnie Cordero   :    MRN:  [id number]    Admit date:  2023  Discharge date:  ***    Code Status Order: Full Code   Advance Directives:     Admitting Physician:  Riya Escobedo MD  PCP: Shantel Santa MD    Discharging Nurse: [facility] Unit/Room#: 9474/1473-05  Discharging Unit Phone Number: ***    Emergency Contact:   Extended Emergency Contact Information  Primary Emergency Contact: [address] of [address] [place] [address] Phone: [phone]  Relation: Child  Secondary Emergency Contact: [address] [facility] Phone: [phone]  Mobile Phone: [phone]  Relation: Niece/Nephew    Past Surgical History:  Past Surgical History:   Procedure Laterality Date    BRONCHOSCOPY  2019    Dr. Price Plants - w/BAL    CARDIAC CATHETERIZATION  2019    Dr. Ragini Godoy N/A 2020    COLONOSCOPY DIAGNOSTIC performed by Ki Hilton DO at [facility] 10/22/2021    COLONOSCOPY POLYPECTOMY SNARE/COLD BIOPSY performed by Katiana Chau MD at [facility] N/A 2019    OFF PUMP CORONARY ARTERY BYPASS GRAFTING X2, INTERNAL MAMMARY ARTERY, SAPHENOUS VEIN GRAFT performed by Fabian Benson MD at [address] [facility] [address] CATH  2021    IR MIDLINE CATH 2021 [address], PARTIAL Left     SIGMOIDOSCOPY  2020    4 bands    SIGMOIDOSCOPY N/A 2020    FLEX SIG W/ BANDING SIGMOIDOSCOPY DIAGNOSTIC FLEXIBLE performed by iK Hilton DO at [facility]       Immunization History:   Immunization History   Administered Date(s) Administered    COVID-19, PFIZER PURPLE top, DILUTE for use, (age 15 y+), 30mcg/0.3mL 2021, 2021    Influenza Virus Vaccine 2013, 10/17/2013    Influenza, FLUARIX, FLULAVAL, FLUZONE (age 10 mo+) AND AFLURIA, (age 1 y+), PF, 0.5mL 10/04/2019, 10/14/2021    Influenza, [place], 6 mo and older, IM, PF (Flulaval, Fluarix) 03/19/2019    Pneumococcal Conjugate 13-valent (Jaime Balm) 07/19/2017, 07/19/2017    Pneumococcal Polysaccharide ([id number]) 02/19/2015, 02/19/2015    Tdap (Boostrix, Adacel) 07/17/2018       Active Problems:  Patient Active Problem List   Diagnosis Code    Hyperlipidemia E78.5    Asthma J45.909    OA (osteoarthritis) M19.90    Tobacco use Z72.0    COPD exacerbation ([facility]) J44.1    Septicemia ([facility]) A41.9    Abnormal chest x-ray R93.89    COPD with acute exacerbation ([facility]) J44.1    Shortness of breath R06.02    Tobacco abuse Z72.0    Moderate malnutrition ([facility]) E44.0    NSTEMI (non-ST elevated myocardial infarction) ([facility] I21.4    Acute respiratory failure with hypoxia ([facility]) J96.01    CAD (coronary artery disease) I25.10    Acute pulmonary edema ([facility]) J81.0    Pulmonary infiltrate R91.8    Elevated brain natriuretic peptide (BNP) level R79.89    Leukocytosis D72.829    Ischemic cardiomyopathy I25.5    Acute combined systolic and diastolic congestive heart failure ([facility]) I50.41    Hypernatremia E87.0    NADJA (acute kidney injury) ([facility] N17.9    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    Mucus plugging of bronchi T17.500A    CAD in native artery I25.10    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    Pneumonia of both lower lobes due to methicillin resistant Staphylococcus aureus (MRSA) ([facility]) J15.212    GI bleed K92.2    Severe malnutrition ([facility]) E43    Chest pain R07.9    Chronic respiratory failure with hypoxia ([facility]) J96.11    Primary hypertension I10    Anemia D64.9    Acute respiratory failure ([facility]) J96.00    Acute respiratory distress R06.03    Volume overload E87.70    Demand ischemia ([facility]) I24.8    GERD (gastroesophageal reflux disease) K21.9    Acute respiratory failure with hypoxia and hypercapnia ([facility]) J96.01, J96.02    Shock ([facility]) R57.9    Pneumonia due to infectious organism J18.9    Acute on chronic respiratory failure with hypoxia and hypercapnia (HCC) J96.21, J96.22    Chronic obstructive pulmonary disease (HCC) J44.9    Acute on chronic respiratory failure with hypoxemia (HCC) J96.21    Chronic anxiety F41.9    Acute decompensated heart failure (HCC) I50.9    Acute respiratory failure with hypoxia and hypercarbia (HCC) J96.01, J96.02    Acute kidney injury (HCC) N17.9    Elevated procalcitonin R79.89    COPD, severe (HCC) J44.9    Anxiety F41.9    Severe anxiety F41.9    Severe protein-calorie malnutrition (HCC) E43    HCAP (healthcare-associated pneumonia) J18.9    Pleural effusion J90    Hyperglycemia R73.9    Sepsis ([facility]) A41.9    Tachycardia R00.0    Acute hypoxemic respiratory failure (HCC) J96.01    Septic shock ([facility]) A41.9, R65.21    Bacteremia R78.81    Urinary tract infection without hematuria N39.0    Atrial fibrillation with RVR ([facility]) I48.91    Electrolyte disorder E87.8       Isolation/Infection:   Isolation            Contact          Patient Infection Status       Infection Onset Added Last Indicated Last Indicated By Review Planned Expiration Resolved Resolved By    MDRO (multi-drug resistant organism) 03/29/22 03/31/22 03/30/22 Culture, Urine        Resolved    COVID-19 (Rule Out) 01/04/23 01/04/23 01/04/23 COVID-19 & Influenza Combo (Ordered)   01/04/23 Rule-Out Test Resulted    COVID-19 (Rule Out) 04/09/22 04/09/22 04/09/22 COVID-19, Rapid (Ordered)   04/09/22 Rule-Out Test Resulted    C-diff Rule Out 03/30/22 03/30/22 03/31/22 Clostridium difficile toxin/antigen (Ordered)   03/31/22 Rule-Out Test Resulted    COVID-19 (Rule Out) 03/30/22 03/30/22 03/30/22 COVID-19 & Influenza Combo (Ordered)   03/30/22 Rule-Out Test Resulted    C-diff (Clostridium difficile) 03/09/22 03/10/22 03/09/22 C. difficile toxin Molecular   03/30/22 Zoraida Nunez RN    C-diff Rule Out 03/09/22 03/10/22 03/09/22 C. difficile toxin Molecular (Ordered)   03/10/22 Rule-Out Test Resulted    C-diff Rule Out 03/09/22 03/09/22 03/09/22 Clostridium Difficile Toxin/Antigen (Ordered)   03/09/22 Rule-Out Test Resulted    COVID-19 (Rule Out) 10/26/21 10/26/21 10/26/21 COVID-19, Rapid (Ordered)   10/26/21 Rule-Out Test Resulted    COVID-19 (Rule Out) 10/17/21 10/17/21 10/17/21 COVID-19, Rapid (Ordered)   10/17/21 Rule-Out Test Resulted    COVID-19 (Rule Out) 10/14/21 10/14/21 10/14/21 COVID-19, Rapid (Ordered)   10/14/21 Rule-Out Test Resulted    COVID-19 (Rule Out) 10/12/21 10/12/21 10/12/21 COVID-19 & Influenza Combo (Ordered)   10/12/21 Rule-Out Test Resulted    COVID-19 (Rule Out) 09/28/21 09/28/21 09/28/21 COVID-19 & Influenza Combo (Ordered)   09/28/21 Rule-Out Test Resulted    COVID-19 (Rule Out) 09/27/21 09/27/21 09/27/21 COVID-19, Rapid (Ordered)   09/27/21 Rule-Out Test Resulted    COVID-19 (Rule Out) 09/23/21 09/23/21 09/23/21 COVID-19, Rapid (Ordered)   09/23/21 Rule-Out Test Resulted    COVID-19 (Rule Out) 09/16/21 09/16/21 09/16/21 COVID-19 & Influenza Combo (Ordered)   09/16/21 Rule-Out Test Resulted    COVID-19 (Rule Out) 09/09/21 09/09/21 09/09/21 COVID-19 (Ordered)   09/10/21 Cornelious Milks, RN    COVID-19 (Rule Out) 09/09/21 09/09/21 09/09/21 COVID-19, Rapid (Ordered)   09/09/21 Rule-Out Test Resulted    COVID-19 (Rule Out) 09/09/21 09/09/21 09/09/21 SARS-CoV-2 NAAT (Rapid) (Ordered)   09/09/21 Rule-Out Test Resulted    COVID-19 (Rule Out) 08/30/21 08/30/21 08/30/21 COVID-19 (Ordered)   08/30/21 Rule-Out Test Resulted    COVID-19 (Rule Out) 08/29/21 08/29/21 08/29/21 COVID-19, Rapid (Ordered)   08/29/21 Rule-Out Test Resulted    COVID-19 (Rule Out) 06/01/21 06/01/21 06/01/21 COVID-19 & Influenza Combo (Ordered)   06/01/21 Rule-Out Test Resulted    MRSA 09/22/19 09/25/19 09/22/19 Respiratory Culture   06/01/21 Malissa Nicole, RN            Nurse Assessment:  Last Vital Signs: /76   Pulse 82   Temp 98 °F (36.7 °C) (Oral)   Resp 20   Ht 5' 4\" (1.626 m)   Wt 142 lb 10.2 oz (64.7 kg)   SpO2 98%   BMI 24.48 kg/m²     Last documented pain score (0-10 scale): Pain Level: 0  Last Weight:   Wt Readings from Last 1 Encounters:   23 142 lb 10.2 oz (64.7 kg)     Mental Status:  {IP PT MENTAL STATUS:55105}    IV Access:  { LUPE IV ACCESS:531467303}    Nursing Mobility/ADLs:  Walking   {CHP DME NFQU:868935198}  Transfer  {CHP DME LMPE:890785981}  Bathing  {CHP DME TFYP:851285980}  Dressing  {CHP DME EDWP:766298952}  Toileting  {CHP DME TTDU:646002201}  Feeding  {CHP DME KMQT:339368121}  Med Admin  {CHP DME QULX:286161048}  Med Delivery   { LUPE MED Delivery:036344789}    Wound Care Documentation and Therapy:  Wound 10/20/21 Hand Right;Dorsal skin tear to left hand, tendon exposed (Active)   Number of days: 445        Elimination:  Continence: Bowel: {YES / RE:01862}  Bladder: {YES / CI:04819}  Urinary Catheter: {Urinary Catheter:351526764}   Colostomy/Ileostomy/Ileal Conduit: {YES / KZ:32508}       Date of Last BM: ***    Intake/Output Summary (Last 24 hours) at 2023 0917  Last data filed at 2023 0503  Gross per 24 hour   Intake 10 ml   Output 925 ml   Net -915 ml     I/O last 3 completed shifts:   In: 10 [I.V.:10]  Out: 36 [Urine:925]    Safety Concerns:     [address] [facility] Safety Concerns:353739696}    Impairments/Disabilities:      [address] [facility] Impairments/Disabilities:031789947}    Nutrition Therapy:  Current Nutrition Therapy:   [address] [facility] [place] [facility] Diet List:656251533}    Routes of Feeding: {[facility] DME Other Feedings:323603966}  Liquids: {Slp liquid thickness:38655}  Daily Fluid Restriction: {CHP DME Yes amt example:580578272}  Last Modified Barium Swallow with Video (Video Swallowing Test): {Done Not Done EBUQ:890722525}    Treatments at the Time of Hospital Discharge:   Respiratory Treatments: ***  Oxygen Therapy:  {Therapy; copd oxygen:59870}  Ventilator:    { CC Vent YXDX:408533258}    Rehab Therapies: {THERAPEUTIC INTERVENTION:5815820531}  Weight Bearing Status/Restrictions: [address] [facility] Weight Bearin}  Other Medical Equipment (for information only, NOT a DME order):  {EQUIPMENT:175600409}  Other Treatments: ***    Patient's personal belongings (please select all that are sent with patient):  {CHP DME Belongings:929575487}    RN SIGNATURE:  {Esignature:055682856}    CASE MANAGEMENT/SOCIAL WORK SECTION    Inpatient Status Date: 1/15/23    Readmission Risk Assessment Score:  Readmission Risk              Risk of Unplanned Readmission:  27           Discharging to Facility/ [address]   [address]   [address] [place] [address]   [phone]    / signature: Electronically signed by Liza Cooley RN on 1/15/23 at 10:19 AM EST    PHYSICIAN SECTION    Prognosis: Fair    Condition at Discharge: Stable    Rehab Potential (if transferring to Rehab): Fair    Recommended Labs or Other Treatments After Discharge: Follow-up with PCP within the next 1 week  Follow-up with gastroenterology within the next 4 weeks. I did discuss this with the patient and she expressed understanding. Physician Certification: I certify the above information and transfer of Zack Ruiz  is necessary for the continuing treatment of the diagnosis listed and that she requires Home Care for greater 30 days.      Update Admission H&P: No change in H&P    PHYSICIAN SIGNATURE:  Electronically signed by Jorge Luis Fernández MD on 1/15/23 at 10:10 AM EST

## 2023-01-08 NOTE — PROGRESS NOTES
INTERNAL MED PROGRESS NOTE      Subjective     Patient reported having persistent epigastric pain throughout the night despite taking as needed Tums. Pain currently improved after a dose of IV Pepcid and oral Tums. Assessment/Plan      --GERD/Chest pain: +epigastric pain on palpation for me. Still with persistent symptoms on IV PPI BID, prn Tums. Concern about prednisone induced gastritis, no evidence of bleeding. Continue IV PPI twice daily, change steroids to IV. GI consulted due to persistent pain and associated N/V (unsure if ok to do PPI and Pepcid together for better control). Abdomen is soft otherwise without any clinical evidence of bowel obstruction. Cardiac work-up including troponin and EKG when non contributory    -- Nausea and vomiting: Likely related to gastritis. Treat with as needed antiemetics as ordered. --Acute COPD exacerbation:Clinically improving. Prednisone switched for IV Solu-Medrol due to GI complaints. Continue scheduled and as needed DuoNebs. Supplemental oxygen. --Presumed flash pulmonary edema: Treated with IV Lasix x3 doses. Clinically resolved. --Acute hypoxic and hypercapnic respiratory failure: Was on BiPAP overnight, currently weaned off. Now on 2 L of NC O2 which is baseline requirement for her. --Paroxysmal A. fib: Fairly rate controlled at this point. Continue outpatient medication as ordered. Patient is on Eliquis for anticoagulation    --DM type II: Continue subcu insulin regimen including long-acting and short acting insulin as ordered, continue SSI protocol for more adequate glycemic control.    --HTN, controlled, cont home meds as ordered     --GERD: Cont home med     --HLD: Continue current statin therapy      --Anxiety/Depression: Patient seems very anxious shakiness appears to be more related to this than the effect of steroid.   Will place on as needed Ativan         DVT prophylaxis: Heparin/Lovenox      Disposition: Pending improvement in GI symptoms      Jd Yanez MD  1/8/2023 @ 11:18 AM      Objective               Intake/Output Summary (Last 24 hours) at 1/8/2023 1118  Last data filed at 1/8/2023 0940  Gross per 24 hour   Intake 10 ml   Output 1025 ml   Net -1015 ml          Vitals:   Vitals:    01/08/23 0840   BP: 132/76   Pulse: 82   Resp: 20   Temp: 98 °F (36.7 °C)   SpO2: 98%       Physical Exam       Constitutional: Well developed, Well nourished, NAD  Neurologic: Alert & oriented x 3, No focal deficits noted. CNs II-XII grossly intact and symmetrical  Psychiatric: Affect normal, Mood normal.  HENT: Normocephalic, Atraumatic,  Eyes: PERRLA, EOMI, No pallor/scleral icterus. Neck: Normal range of motion, No tenderness, Supple, no bruit  Lymphatic: No cervical lymphadenopathy noted. Cardiovascular: Regular rate and rhythm, normal S1-S2, No murmurs, gallops or rubs. Thorax & Lungs: CTA bilaterally, No respiratory distress, No wheezing   Abdomen: Soft, BS +ve, no tenderness, no rebound or guarding  Skin: Warm, Dry, No erythema, No rash. Back: No tenderness, No CVA tenderness. Extremities: No edema, No tenderness  Musculoskeletal:. No major deformities noted.           Labs       Reviewed, as follows:  Recent Results (from the past 24 hour(s))   POCT Glucose    Collection Time: 01/07/23 11:52 AM   Result Value Ref Range    POC Glucose 104 (H) 70 - 99 mg/dl    Performed on ACCU-CHEK    EKG 12 Lead    Collection Time: 01/07/23 12:34 PM   Result Value Ref Range    Ventricular Rate 80 BPM    Atrial Rate 70 BPM    P-R Interval 150 ms    QRS Duration 94 ms    Q-T Interval 384 ms    QTc Calculation (Bazett) 442 ms    P Axis 76 degrees    R Axis -63 degrees    T Axis 55 degrees    Diagnosis       Sinus rhythm with Premature atrial complexesLeft axis deviationPossible Anteroseptal infarct (cited on or before 02-APR-2022)Abnormal ECGWhen compared with ECG of 04-JAN-2023 11:41,Questionable change in initial forces of Septal leadsT wave inversion no longer evident in Anterior leads     Troponin    Collection Time: 01/07/23  2:42 PM   Result Value Ref Range    Troponin 0.05 (H) <0.01 ng/mL   POCT Glucose    Collection Time: 01/07/23  4:29 PM   Result Value Ref Range    POC Glucose 141 (H) 70 - 99 mg/dl    Performed on ACCU-CHEK    Troponin    Collection Time: 01/07/23  5:52 PM   Result Value Ref Range    Troponin 0.03 (H) <0.01 ng/mL   Troponin    Collection Time: 01/07/23  8:50 PM   Result Value Ref Range    Troponin 0.02 (H) <0.01 ng/mL   POCT Glucose    Collection Time: 01/07/23  9:19 PM   Result Value Ref Range    POC Glucose 182 (H) 70 - 99 mg/dl    Performed on ACCU-CHEK    Troponin    Collection Time: 01/07/23 11:38 PM   Result Value Ref Range    Troponin 0.04 (H) <0.01 ng/mL   Troponin    Collection Time: 01/08/23  2:24 AM   Result Value Ref Range    Troponin 0.03 (H) <0.01 ng/mL   Troponin    Collection Time: 01/08/23  4:55 AM   Result Value Ref Range    Troponin 0.04 (H) <0.01 ng/mL   Basic Metabolic Panel    Collection Time: 01/08/23  4:55 AM   Result Value Ref Range    Sodium 140 136 - 145 mmol/L    Potassium 3.6 3.5 - 5.1 mmol/L    Chloride 102 99 - 110 mmol/L    CO2 28 21 - 32 mmol/L    Anion Gap 10 3 - 16    Glucose 108 (H) 70 - 99 mg/dL    BUN 55 (H) 7 - 20 mg/dL    Creatinine 1.5 (H) 0.6 - 1.2 mg/dL    Est, Glom Filt Rate 35 (A) >60    Calcium 10.1 8.3 - 10.6 mg/dL   CBC with Auto Differential    Collection Time: 01/08/23  4:55 AM   Result Value Ref Range    WBC 11.1 (H) 4.0 - 11.0 K/uL    RBC 4.38 4.00 - 5.20 M/uL    Hemoglobin 13.5 12.0 - 16.0 g/dL    Hematocrit 41.1 36.0 - 48.0 %    MCV 93.9 80.0 - 100.0 fL    MCH 30.9 26.0 - 34.0 pg    MCHC 32.9 31.0 - 36.0 g/dL    RDW 13.8 12.4 - 15.4 %    Platelets 620 533 - 447 K/uL    MPV 7.4 5.0 - 10.5 fL    Neutrophils % 79.9 %    Lymphocytes % 14.0 %    Monocytes % 5.6 %    Eosinophils % 0.1 %    Basophils % 0.4 %    Neutrophils Absolute 8.9 (H) 1.7 - 7.7 K/uL    Lymphocytes Absolute 1.6 1.0 - 5.1 K/uL    Monocytes Absolute 0.6 0.0 - 1.3 K/uL    Eosinophils Absolute 0.0 0.0 - 0.6 K/uL    Basophils Absolute 0.0 0.0 - 0.2 K/uL   Troponin    Collection Time: 01/08/23  8:17 AM   Result Value Ref Range    Troponin 0.04 (H) <0.01 ng/mL   POCT Glucose    Collection Time: 01/08/23  8:21 AM   Result Value Ref Range    POC Glucose 97 70 - 99 mg/dl    Performed on ACCU-CHEK           Body mass index is 24.48 kg/m².  Patient will follow up with PCP for further management as indicated unless otherwise specifically noted above        Glenis Faith MD  1/8/2023 @ 11:18 AM

## 2023-01-09 ENCOUNTER — ANESTHESIA EVENT (OUTPATIENT)
Dept: ENDOSCOPY | Age: 83
End: 2023-01-09
Payer: MEDICARE

## 2023-01-09 ENCOUNTER — ANESTHESIA (OUTPATIENT)
Dept: ENDOSCOPY | Age: 83
End: 2023-01-09
Payer: MEDICARE

## 2023-01-09 LAB
ANION GAP SERPL CALCULATED.3IONS-SCNC: 11 MMOL/L (ref 3–16)
BASOPHILS ABSOLUTE: 0 K/UL (ref 0–0.2)
BASOPHILS RELATIVE PERCENT: 0.3 %
BUN BLDV-MCNC: 56 MG/DL (ref 7–20)
CALCIUM SERPL-MCNC: 9.8 MG/DL (ref 8.3–10.6)
CHLORIDE BLD-SCNC: 100 MMOL/L (ref 99–110)
CO2: 28 MMOL/L (ref 21–32)
CREAT SERPL-MCNC: 1.6 MG/DL (ref 0.6–1.2)
EOSINOPHILS ABSOLUTE: 0 K/UL (ref 0–0.6)
EOSINOPHILS RELATIVE PERCENT: 0.4 %
GFR SERPL CREATININE-BSD FRML MDRD: 32 ML/MIN/{1.73_M2}
GLUCOSE BLD-MCNC: 110 MG/DL (ref 70–99)
GLUCOSE BLD-MCNC: 116 MG/DL (ref 70–99)
GLUCOSE BLD-MCNC: 158 MG/DL (ref 70–99)
GLUCOSE BLD-MCNC: 93 MG/DL (ref 70–99)
GLUCOSE BLD-MCNC: 94 MG/DL (ref 70–99)
HCT VFR BLD CALC: 39.6 % (ref 36–48)
HEMOGLOBIN: 13.1 G/DL (ref 12–16)
LYMPHOCYTES ABSOLUTE: 1.8 K/UL (ref 1–5.1)
LYMPHOCYTES RELATIVE PERCENT: 19.1 %
MCH RBC QN AUTO: 30.9 PG (ref 26–34)
MCHC RBC AUTO-ENTMCNC: 33 G/DL (ref 31–36)
MCV RBC AUTO: 93.5 FL (ref 80–100)
MONOCYTES ABSOLUTE: 0.5 K/UL (ref 0–1.3)
MONOCYTES RELATIVE PERCENT: 5.9 %
NEUTROPHILS ABSOLUTE: 7 K/UL (ref 1.7–7.7)
NEUTROPHILS RELATIVE PERCENT: 74.3 %
PDW BLD-RTO: 13.8 % (ref 12.4–15.4)
PERFORMED ON: ABNORMAL
PERFORMED ON: NORMAL
PLATELET # BLD: 239 K/UL (ref 135–450)
PMV BLD AUTO: 7.7 FL (ref 5–10.5)
POTASSIUM SERPL-SCNC: 4 MMOL/L (ref 3.5–5.1)
RBC # BLD: 4.24 M/UL (ref 4–5.2)
SODIUM BLD-SCNC: 139 MMOL/L (ref 136–145)
TROPONIN: 0.03 NG/ML
TROPONIN: 0.04 NG/ML
WBC # BLD: 9.4 K/UL (ref 4–11)

## 2023-01-09 PROCEDURE — 2580000003 HC RX 258: Performed by: HOSPITALIST

## 2023-01-09 PROCEDURE — 0DJ08ZZ INSPECTION OF UPPER INTESTINAL TRACT, VIA NATURAL OR ARTIFICIAL OPENING ENDOSCOPIC: ICD-10-PCS | Performed by: INTERNAL MEDICINE

## 2023-01-09 PROCEDURE — 80048 BASIC METABOLIC PNL TOTAL CA: CPT

## 2023-01-09 PROCEDURE — 2580000003 HC RX 258: Performed by: NURSE ANESTHETIST, CERTIFIED REGISTERED

## 2023-01-09 PROCEDURE — 1200000000 HC SEMI PRIVATE

## 2023-01-09 PROCEDURE — 6360000002 HC RX W HCPCS: Performed by: HOSPITALIST

## 2023-01-09 PROCEDURE — 97116 GAIT TRAINING THERAPY: CPT

## 2023-01-09 PROCEDURE — 6370000000 HC RX 637 (ALT 250 FOR IP): Performed by: INTERNAL MEDICINE

## 2023-01-09 PROCEDURE — 2060000000 HC ICU INTERMEDIATE R&B

## 2023-01-09 PROCEDURE — 94761 N-INVAS EAR/PLS OXIMETRY MLT: CPT

## 2023-01-09 PROCEDURE — 94640 AIRWAY INHALATION TREATMENT: CPT

## 2023-01-09 PROCEDURE — 84484 ASSAY OF TROPONIN QUANT: CPT

## 2023-01-09 PROCEDURE — 2500000003 HC RX 250 WO HCPCS: Performed by: NURSE ANESTHETIST, CERTIFIED REGISTERED

## 2023-01-09 PROCEDURE — 2700000000 HC OXYGEN THERAPY PER DAY

## 2023-01-09 PROCEDURE — 6360000002 HC RX W HCPCS: Performed by: INTERNAL MEDICINE

## 2023-01-09 PROCEDURE — 85025 COMPLETE CBC W/AUTO DIFF WBC: CPT

## 2023-01-09 PROCEDURE — 36415 COLL VENOUS BLD VENIPUNCTURE: CPT

## 2023-01-09 PROCEDURE — 7100000011 HC PHASE II RECOVERY - ADDTL 15 MIN: Performed by: INTERNAL MEDICINE

## 2023-01-09 PROCEDURE — 3700000000 HC ANESTHESIA ATTENDED CARE: Performed by: INTERNAL MEDICINE

## 2023-01-09 PROCEDURE — 3609017100 HC EGD: Performed by: INTERNAL MEDICINE

## 2023-01-09 PROCEDURE — 97530 THERAPEUTIC ACTIVITIES: CPT

## 2023-01-09 PROCEDURE — 6360000002 HC RX W HCPCS: Performed by: NURSE ANESTHETIST, CERTIFIED REGISTERED

## 2023-01-09 PROCEDURE — 7100000010 HC PHASE II RECOVERY - FIRST 15 MIN: Performed by: INTERNAL MEDICINE

## 2023-01-09 PROCEDURE — 6370000000 HC RX 637 (ALT 250 FOR IP): Performed by: HOSPITALIST

## 2023-01-09 PROCEDURE — 2709999900 HC NON-CHARGEABLE SUPPLY: Performed by: INTERNAL MEDICINE

## 2023-01-09 PROCEDURE — C9113 INJ PANTOPRAZOLE SODIUM, VIA: HCPCS | Performed by: INTERNAL MEDICINE

## 2023-01-09 RX ORDER — SUCRALFATE 1 G/1
1 TABLET ORAL EVERY 6 HOURS SCHEDULED
Status: DISCONTINUED | OUTPATIENT
Start: 2023-01-09 | End: 2023-01-15 | Stop reason: HOSPADM

## 2023-01-09 RX ORDER — PROPOFOL 10 MG/ML
INJECTION, EMULSION INTRAVENOUS PRN
Status: DISCONTINUED | OUTPATIENT
Start: 2023-01-09 | End: 2023-01-09 | Stop reason: SDUPTHER

## 2023-01-09 RX ORDER — SODIUM CHLORIDE, SODIUM LACTATE, POTASSIUM CHLORIDE, CALCIUM CHLORIDE 600; 310; 30; 20 MG/100ML; MG/100ML; MG/100ML; MG/100ML
INJECTION, SOLUTION INTRAVENOUS CONTINUOUS PRN
Status: DISCONTINUED | OUTPATIENT
Start: 2023-01-09 | End: 2023-01-09 | Stop reason: SDUPTHER

## 2023-01-09 RX ORDER — LIDOCAINE HYDROCHLORIDE 20 MG/ML
INJECTION, SOLUTION EPIDURAL; INFILTRATION; INTRACAUDAL; PERINEURAL PRN
Status: DISCONTINUED | OUTPATIENT
Start: 2023-01-09 | End: 2023-01-09 | Stop reason: SDUPTHER

## 2023-01-09 RX ORDER — FAMOTIDINE 20 MG/1
20 TABLET, FILM COATED ORAL 2 TIMES DAILY
Status: DISCONTINUED | OUTPATIENT
Start: 2023-01-09 | End: 2023-01-10

## 2023-01-09 RX ADMIN — FAMOTIDINE 20 MG: 20 TABLET, FILM COATED ORAL at 21:20

## 2023-01-09 RX ADMIN — PANTOPRAZOLE SODIUM 40 MG: 40 INJECTION, POWDER, FOR SOLUTION INTRAVENOUS at 21:20

## 2023-01-09 RX ADMIN — SODIUM CHLORIDE 100 ML/HR: 9 INJECTION, SOLUTION INTRAVENOUS at 12:31

## 2023-01-09 RX ADMIN — TIOTROPIUM BROMIDE INHALATION SPRAY 2 PUFF: 3.12 SPRAY, METERED RESPIRATORY (INHALATION) at 08:06

## 2023-01-09 RX ADMIN — DOXYCYCLINE HYCLATE 100 MG: 100 TABLET, COATED ORAL at 00:51

## 2023-01-09 RX ADMIN — ONDANSETRON 4 MG: 2 INJECTION INTRAMUSCULAR; INTRAVENOUS at 05:21

## 2023-01-09 RX ADMIN — PANTOPRAZOLE SODIUM 40 MG: 40 INJECTION, POWDER, FOR SOLUTION INTRAVENOUS at 08:38

## 2023-01-09 RX ADMIN — ACETAMINOPHEN 325MG 650 MG: 325 TABLET ORAL at 09:41

## 2023-01-09 RX ADMIN — GUAIFENESIN 600 MG: 600 TABLET, EXTENDED RELEASE ORAL at 09:41

## 2023-01-09 RX ADMIN — Medication 2 PUFF: at 20:09

## 2023-01-09 RX ADMIN — LIDOCAINE HYDROCHLORIDE 80 MG: 20 INJECTION, SOLUTION EPIDURAL; INFILTRATION; INTRACAUDAL; PERINEURAL at 12:57

## 2023-01-09 RX ADMIN — ANTACID TABLETS 500 MG: 500 TABLET, CHEWABLE ORAL at 16:33

## 2023-01-09 RX ADMIN — BUSPIRONE HYDROCHLORIDE 5 MG: 5 TABLET ORAL at 21:20

## 2023-01-09 RX ADMIN — GUAIFENESIN 600 MG: 600 TABLET, EXTENDED RELEASE ORAL at 21:20

## 2023-01-09 RX ADMIN — ATORVASTATIN CALCIUM 40 MG: 40 TABLET, FILM COATED ORAL at 21:20

## 2023-01-09 RX ADMIN — BUSPIRONE HYDROCHLORIDE 5 MG: 5 TABLET ORAL at 16:33

## 2023-01-09 RX ADMIN — ANTACID TABLETS 500 MG: 500 TABLET, CHEWABLE ORAL at 09:41

## 2023-01-09 RX ADMIN — PROPOFOL 50 MG: 10 INJECTION, EMULSION INTRAVENOUS at 12:57

## 2023-01-09 RX ADMIN — Medication 2 PUFF: at 08:04

## 2023-01-09 RX ADMIN — METOPROLOL TARTRATE 25 MG: 25 TABLET, FILM COATED ORAL at 09:41

## 2023-01-09 RX ADMIN — METOPROLOL TARTRATE 25 MG: 25 TABLET, FILM COATED ORAL at 21:20

## 2023-01-09 RX ADMIN — SODIUM CHLORIDE, SODIUM LACTATE, POTASSIUM CHLORIDE, AND CALCIUM CHLORIDE: .6; .31; .03; .02 INJECTION, SOLUTION INTRAVENOUS at 12:57

## 2023-01-09 RX ADMIN — SERTRALINE HYDROCHLORIDE 50 MG: 50 TABLET ORAL at 09:42

## 2023-01-09 RX ADMIN — BUSPIRONE HYDROCHLORIDE 5 MG: 5 TABLET ORAL at 09:41

## 2023-01-09 RX ADMIN — ALBUTEROL SULFATE 2.5 MG: 2.5 SOLUTION RESPIRATORY (INHALATION) at 16:45

## 2023-01-09 RX ADMIN — Medication 400 MG: at 09:41

## 2023-01-09 RX ADMIN — ALBUTEROL SULFATE 2.5 MG: 2.5 SOLUTION RESPIRATORY (INHALATION) at 20:05

## 2023-01-09 RX ADMIN — ALBUTEROL SULFATE 2.5 MG: 2.5 SOLUTION RESPIRATORY (INHALATION) at 08:04

## 2023-01-09 RX ADMIN — ROFLUMILAST 500 MCG: 500 TABLET ORAL at 09:41

## 2023-01-09 RX ADMIN — METHYLPREDNISOLONE SODIUM SUCCINATE 40 MG: 40 INJECTION, POWDER, FOR SOLUTION INTRAMUSCULAR; INTRAVENOUS at 08:38

## 2023-01-09 RX ADMIN — FERROUS SULFATE TAB 325 MG (65 MG ELEMENTAL FE) 325 MG: 325 (65 FE) TAB at 09:41

## 2023-01-09 RX ADMIN — SUCRALFATE 1 G: 1 TABLET ORAL at 16:34

## 2023-01-09 ASSESSMENT — PAIN SCALES - GENERAL
PAINLEVEL_OUTOF10: 0
PAINLEVEL_OUTOF10: 5
PAINLEVEL_OUTOF10: 0
PAINLEVEL_OUTOF10: 6

## 2023-01-09 ASSESSMENT — PAIN DESCRIPTION - LOCATION: LOCATION: THROAT;OTHER (COMMENT)

## 2023-01-09 ASSESSMENT — PAIN - FUNCTIONAL ASSESSMENT
PAIN_FUNCTIONAL_ASSESSMENT: ACTIVITIES ARE NOT PREVENTED
PAIN_FUNCTIONAL_ASSESSMENT: 0-10
PAIN_FUNCTIONAL_ASSESSMENT: ACTIVITIES ARE NOT PREVENTED

## 2023-01-09 ASSESSMENT — PAIN DESCRIPTION - DESCRIPTORS
DESCRIPTORS: ACHING
DESCRIPTORS: ACHING

## 2023-01-09 ASSESSMENT — ENCOUNTER SYMPTOMS: SHORTNESS OF BREATH: 1

## 2023-01-09 NOTE — PROGRESS NOTES
Physical Therapy  Facility/Department: [place] [address] PCU  Daily Treatment Note  NAME: Vinicio Joe  : 1940  MRN: [id number]    Date of Service: 2023    Discharge Recommendations:  24 hour supervision or assist, Home with Home health PT   PT Equipment Recommendations  Equipment Needed: No    [facility] 6 Clicks Inpatient Mobility:  AM-PAC Mobility Inpatient   How much difficulty turning over in bed?: None  How much difficulty sitting down on / standing up from a chair with arms?: A Little  How much difficulty moving from lying on back to sitting on side of bed?: None  How much help from another person moving to and from a bed to a chair?: A Little  How much help from another person needed to walk in hospital room?: A Little  How much help from another person for climbing 3-5 steps with a railing?: A Little  AM-PAC Inpatient Mobility Raw Score : 20  AM-PAC Inpatient T-Scale Score : 47.67  Mobility Inpatient CMS 0-100% Score: 35.83  Mobility Inpatient CMS G-Code Modifier : CJ    Patient Diagnosis(es): The primary encounter diagnosis was COPD exacerbation ([facility] [address] Diagnoses of Acute on chronic respiratory failure with hypoxia (HCC), ZENDEJAS (dyspnea on exertion), Acute kidney injury ([facility] [address] and Transient hypotension were also pertinent to this visit. Assessment   Assessment: Pt participated fairly well with PT this morning, although voices increased c/o fatigue and weakness today. Able to amb distances of ~25 feet within hospital room at CGA/SBA x 1 level without AD. Gait appears mildly unsteady at times, although pt without overt LOB, and declined to trial amb with walker when offered. Continue to recommend pt return home with 24-hr sup/assist and home PT services. Activity Tolerance: Patient limited by endurance; Patient limited by fatigue  Equipment Needed: No     Plan    General Plan: 3-5 times per week  Specific Instructions for Next Treatment: Progress ther ex and mobility as tolerated  Current Treatment Recommendations: Strengthening;Balance training;Functional mobility training;Gait training;Home exercise program;Safety education & training;Patient/Caregiver education & training;Transfer training; Endurance training; Therapeutic activities     Restrictions  Restrictions/Precautions  Restrictions/Precautions: Fall Risk  Position Activity Restriction  Other position/activity restrictions: 2L O2     Subjective    Subjective: Pt agreeable to PT with encouragement. Pt c/o increased fatigue and weakness today. \"I'm just so tired I can barely keep my eyes open. \"  Pain: Pt denies pain when asked, \"I'm just so tired. \"  Overall Orientation Status: Within Functional Limits     Objective   Vitals  Heart Rate: 73  Heart Rate Source: Monitor  BP: (!) 144/72  BP Location: Right upper arm  BP Method: Automatic  Patient Position: Sitting;Up in chair  MAP (Calculated): 96  SpO2: 96 %  O2 Device: Nasal cannula (2L O2)    Bed Mobility Training  Supine to Sit: Modified independent; Additional time  Scooting: Modified independent; Additional time    Balance  Sitting: Intact  Standing: Impaired  Standing - Static: Fair;Occasional  Standing - Dynamic: Fair;Occasional (without AD)    Transfer Training  Interventions: Safety awareness training;Verbal cues; Visual cues  Sit to Stand: Stand-by assistance; Additional time  Stand to Sit: Stand-by assistance  Bed to Chair: Contact-guard assistance; Additional time (without AD)    Gait Training  Overall Level of Assistance: Contact-guard assistance;Stand-by assistance (CGA/close SBA x 1)  Interventions: Safety awareness training;Verbal cues; Visual cues; Tactile cues  Base of Support: Widened  Speed/Harika: Pace decreased (< 100 feet/min)  Step Length: Left shortened;Right shortened  Gait Abnormalities: Decreased step clearance;Trunk sway increased  Distance (ft): 25 Feet (amb distance self-limited by pt 2* c/o fatigue)  Assistive Device: Gait belt; Other (comment) (no AD, pt declined to trial amb with walker to assist with energy conservation)    PT Exercises  Exercise Treatment: Pt declined LE ther ex this session 2* fatigue after performing functional mobility tasks. Safety Devices  Type of Devices: Gait belt;Nurse notified; Left in chair;Chair alarm in place;Call light within reach; All fall risk precautions in place; Patient at risk for falls     Goals  Short Term Goals  Time Frame for Short Term Goals: 7 days (1/12/23)  Short Term Goal 1: Pt will perform sit<>stand and bed<>chair transfers with modified(I)  Short Term Goal 2: Pt will ambulate x 50 feet without device with supervision  Short Term Goal 3: Pt will meet 3/5 PT-related CHF goals - MET 1/05/23  Short Term Goal 4: By 1/8/23, pt will tolerate 15 reps of LE ther ex for improved strength and activity tolerance - not yet met as of 1/09/23 due to pt c/o fatigue, goal ongoing  Patient Goals   Patient Goals : \"To go back home\"    Education  Patient Education  Education Given To: Patient; Family  Education Provided: Role of Therapy;Plan of Care;Family Education; Energy Conservation;Precautions;Transfer Training  Education Method: Demonstration;Verbal  Barriers to Learning: Vision  Education Outcome: Verbalized understanding;Demonstrated understanding;Continued education needed    Therapy Time   Individual Concurrent Group Co-treatment   Time In 1015         Time Out 1040         Minutes 25         Timed Code Treatment Minutes: [address], [place] #[id number]    If pt is unable to be seen after this session, please let this note serve as discharge summary. Please see case management note for discharge disposition. Thank you.

## 2023-01-09 NOTE — H&P
Pre-sedation Assessment    History and Physical / Pre-Sedation Assessment  Patient:  Gale Pike   :   1940     Intended Procedure: EGD      HPI: 80 yo female with DM, HTN, GERD, hyperlipidemia, anxiety/depression with COPD exacerbation and epigastric pain exacerbated by swallowing, along w recent N/V    Past Medical History:   Diagnosis Date    NADJA (acute kidney injury) ([facility] [address]     Asthma     CAD (coronary artery disease)     CHF (congestive heart failure) ([facility] [address]     unsure    Chronic anxiety     COPD (chronic obstructive pulmonary disease) ([facility] [address]     GERD (gastroesophageal reflux disease) 2021    History of blood transfusion     Hyperlipidemia     Hypertension     MRSA (methicillin resistant staph aureus) culture positive 2019    + resp cx    OA (osteoarthritis) 2014    Thyroid disease      Past Surgical History:   Procedure Laterality Date    BRONCHOSCOPY  2019    Dr. Devin Aaron - w/BAL    CARDIAC CATHETERIZATION  2019    Dr. Isaiah Klein N/A 2020    COLONOSCOPY DIAGNOSTIC performed by Magy Campos DO at [address] [facility] N/A 10/22/2021    COLONOSCOPY POLYPECTOMY SNARE/COLD BIOPSY performed by Bailee Nash MD at [address] [facility] N/A 2019    OFF PUMP CORONARY ARTERY BYPASS GRAFTING X2, INTERNAL MAMMARY ARTERY, SAPHENOUS VEIN GRAFT performed by Luis E Mercado MD at [address] [facility] [address] CATH  2021    IR MIDLINE CATH 2021 [facility] SPECIAL PROCEDURES    MASTECTOMY, PARTIAL Left     SIGMOIDOSCOPY  2020    4 bands    SIGMOIDOSCOPY N/A 2020    FLEX SIG W/ BANDING SIGMOIDOSCOPY DIAGNOSTIC FLEXIBLE performed by Magy Campos DO at [address] [place] [address]     Current Facility-Administered Medications   Medication Dose Route Frequency Provider Last Rate Last Admin    Benzocaine-Menthol (CEPACOL) 1 lozenge  1 lozenge Oral Q2H PRN Demetrio Garza MD calcium carbonate (TUMS) chewable tablet 500 mg  500 mg Oral Q6H PRN Ju Obrien MD   500 mg at 01/09/23 0941    methylPREDNISolone sodium (SOLU-MEDROL) injection 40 mg  40 mg IntraVENous Daily Ju Obrien MD   40 mg at 01/09/23 0838    pantoprazole (PROTONIX) injection 40 mg  40 mg IntraVENous BID Ju Obrien MD   40 mg at 01/09/23 1430    LORazepam (ATIVAN) tablet 0.5 mg  0.5 mg Oral Q6H PRN Ju Obrien MD   0.5 mg at 01/08/23 2020    apixaban (ELIQUIS) tablet 2.5 mg  2.5 mg Oral BID Conner Mackey MD   2.5 mg at 01/08/23 2020    busPIRone (BUSPAR) tablet 5 mg  5 mg Oral TID Conner Mackey MD   5 mg at 01/09/23 0941    atorvastatin (LIPITOR) tablet 40 mg  40 mg Oral Nightly Conner Mackey MD   40 mg at 01/08/23 2020    clopidogrel (PLAVIX) tablet 75 mg  75 mg Oral Daily Conner Mackey MD   75 mg at 01/08/23 4781    docusate sodium (COLACE) capsule 100 mg  100 mg Oral BID PRN Conner Mackey MD        ferrous sulfate (IRON 325) tablet 325 mg  325 mg Oral Daily with breakfast Conner Mackey MD   325 mg at 01/09/23 0941    fluticasone (FLONASE) 50 MCG/ACT nasal spray 1 spray  1 spray Each Nostril Daily PRN MD Tone Oh [facility]) extended release tablet 600 mg  600 mg Oral BID Conner Mackey MD   600 mg at 01/09/23 0941    magnesium oxide (MAG-OX) tablet 400 mg  400 mg Oral Daily Conner Mackey MD   400 mg at 01/09/23 0941    Roflumilast (DALIRESP) tablet 500 mcg  500 mcg Oral Daily Conner Mackey MD   500 mcg at 01/09/23 0941    sertraline (ZOLOFT) tablet 50 mg  50 mg Oral Daily Conner Mackey MD   50 mg at 01/09/23 0942    metoprolol tartrate (LOPRESSOR) tablet 25 mg  25 mg Oral BID Conner Mackey MD   25 mg at 01/09/23 0941    sodium chloride flush 0.9 % injection 10 mL  10 mL IntraVENous 2 times per day Conner Mackey MD   10 mL at 01/08/23 2021    sodium chloride flush 0.9 % injection 10 mL  10 mL IntraVENous PRN Conner Mackey MD        0.9 % sodium chloride infusion   IntraVENous PRN Merlene Gomez  mL/hr at 01/09/23 1231 100 mL/hr at 01/09/23 1231    senna (SENOKOT) tablet 8.6 mg  1 tablet Oral Daily PRN Merlene Gomez MD        polyethylene glycol (GLYCOLAX) packet 17 g  17 g Oral Daily PRN Merlene Gomez MD        acetaminophen (TYLENOL) tablet 650 mg  650 mg Oral Q6H PRN Merlene Gomez MD   650 mg at 01/09/23 0941    Or    acetaminophen (TYLENOL) suppository 650 mg  650 mg Rectal Q6H PRN Merlene Gomez MD        doxycycline hyclate (VIBRA-TABS) tablet 100 mg  100 mg Oral Q12H Merlene Gomez MD   100 mg at 01/09/23 0051    ondansetron (ZOFRAN-ODT) disintegrating tablet 4 mg  4 mg Oral Q8H PRN Merlene Gomez MD        Or    ondansetron [facility]) injection 4 mg  4 mg IntraVENous Q6H PRN Merlene Gomez MD   4 mg at 01/09/23 0521    mometasone-formoterol (DULERA) 200-5 MCG/ACT inhaler 2 puff  2 puff Inhalation BID Merlene Gomez MD   2 puff at 01/09/23 0804    tiotropium (SPIRIVA RESPIMAT) 2.5 MCG/ACT inhaler 2 puff  2 puff Inhalation Daily Merlene Gomez MD   2 puff at 01/09/23 0806    albuterol (PROVENTIL) nebulizer solution 2.5 mg  2.5 mg Nebulization Q4H Geovanna Dial MD   2.5 mg at 01/09/23 9461    perflutren lipid microspheres (DEFINITY) injection 1.5 mL  1.5 mL IntraVENous ONCE PRN Merlene Gomez MD        glucose chewable tablet 16 g  4 tablet Oral PRN Merlene Gomez MD        dextrose bolus 10% 125 mL  125 mL IntraVENous PRN Merlene Gomez MD        Or    dextrose bolus 10% 250 mL  250 mL IntraVENous PRN Merlene Gomez MD        glucagon (rDNA) injection 1 mg  1 mg SubCUTAneous PRN Merlene Gomez MD        dextrose 10 % infusion   IntraVENous Continuous PRN Merlene Gomez MD        insulin lispro (HUMALOG) injection vial 0-8 Units  0-8 Units SubCUTAneous TID WC Merlene Gomez MD        insulin lispro (HUMALOG) injection vial 0-4 Units  0-4 Units SubCUTAneous Nightly Merlene Gomez MD           Nurses notes reviewed and agreed. Medications reviewed  Allergies: Allergies   Allergen Reactions    Latex Other (See Comments)     Burning      Codeine Other (See Comments)     \"hallucinations\"           Physical Exam:  Vital Signs: /62   Pulse 65   Temp 97.7 °F (36.5 °C) (Temporal)   Resp 16   Ht 5' 4\" (1.626 m)   Wt 148 lb 13 oz (67.5 kg)   SpO2 99%   BMI 25.54 kg/m²  Body mass index is 25.54 kg/m². Airway:Normal  Cardiac:Normal  Pulmonary:Normal  Abdomen:Normal  Specific to procedure: none      Pre-Procedure Assessment/Plan:  ASA 3 - Patient with moderate systemic disease with functional limitations  Mallampati II  Level of Sedation Plan:Deep sedation    Post Procedure plan: Return to same level of care    I assessed the patient and find that the patient is in satisfactory condition to proceed with the planned procedure and sedation plan. I have explained the risk, benefits, and alternatives to the procedure. The patient understands and agrees to proceed.   Yes    Yariel Wynn MD       [phone]) [phone]        Yariel Wynn MD  12:48 PM 1/9/2023

## 2023-01-09 NOTE — PROGRESS NOTES
Report received from [facility]. Agree with previous assessment. Pt is A&Ox4. VSS- on 2.5L/min O2 baseline. Complains of throat  and epigastric pain- started on carafate, pepcid, PPI, tums. Full liquid diet after EGD. Denies other needs. Call light within reach, bed in lowest position, wheels locked.

## 2023-01-09 NOTE — ANESTHESIA POSTPROCEDURE EVALUATION
Department of Anesthesiology  Postprocedure Note    Patient: Linda Lockhart  MRN: [id number]  YOB: 1940  Date of evaluation: 1/9/2023      Procedure Summary     Date: 01/09/23 Room / Location: [address] [place] [address]    Anesthesia Start: 1257 Anesthesia Stop: 0886    Procedure: EGD DIAGNOSTIC ONLY Diagnosis:       Epigastric pain      (Epigastric pain [R10.13])    Surgeons: Tra Olea MD Responsible Provider: Destiny Powell MD    Anesthesia Type: MAC ASA Status: 4          Anesthesia Type: No value filed.     David Phase I: Davdi Score: 10    David Phase II: David Score: 9      Anesthesia Post Evaluation    Patient location during evaluation: PACU  Patient participation: complete - patient participated  Level of consciousness: awake  Pain score: 0  Airway patency: patent  Nausea & Vomiting: no nausea  Complications: no  Cardiovascular status: blood pressure returned to baseline  Respiratory status: acceptable  Hydration status: euvolemic

## 2023-01-09 NOTE — PROGRESS NOTES
Hospitalist Progress Note    Assessment/Plan:    -Chest pain 2/2 GERD/Severe Esophagitis: GI consulted. EGD showed severe esophagitis, black discoloration. Unable to biopsy d/t Eliquis and Plavix. Will hold these and consider repeat EGD in several days. Patient/family concerned about logistics of outpatient testing. Symptoms remain severe. Continue IV PPI twice daily. Add Pepcid and Carafate. Tums PRN. --Acute COPD exacerbation: Improving. Prednisone switched for IV Solu-Medrol due to GI complaints; can likely stop soon. Continue scheduled and as needed DuoNebs. Supplemental oxygen. --Presumed flash pulmonary edema: Treated with IV Lasix x3 doses. Clinically resolved. --Acute hypoxic and hypercapnic respiratory failure: Was on BiPAP overnight, currently weaned off. Now on 2 L of NC O2 which is baseline requirement for her. --Paroxysmal A. fib: Fairly rate controlled at this point. Continue outpatient medication as ordered. Patient is on Eliquis for anticoagulation    --DM type II: Continue subcu insulin regimen including long-acting and short acting insulin as ordered, continue SSI protocol for more adequate glycemic control.    --HTN, controlled, cont home meds as ordered     --GERD: Cont home med     --HLD: Continue current statin therapy      --Anxiety/Depression: Patient seems very anxious shakiness appears to be more related to this than the effect of steroid. Will place on as needed Ativan       DVT Prophylaxis: Hold Eliquis  Diet: ADULT DIET; Full Liquid  Code Status: Full Code  PT/OT Eval Status: As needed    Dispo - Several more days pending repeat EGD    Appropriate for A1 Discharge Unit: No      Date of Admission: 1/4/2023    Chief Complaint: Chest pain    Subjective: EGD completed. Ongoing pain.      Labs:   Recent Labs     01/07/23  0851 01/08/23  0455 01/09/23  0526   WBC 8.7 11.1* 9.4   HGB 12.5 13.5 13.1   HCT 39.5 41.1 39.6    272 239     Recent Labs 01/07/23  0851 01/08/23  0455 01/09/23  0526    140 139   K 4.4 3.6 4.0    102 100   CO2 28 28 28   BUN 55* 55* 56*   CREATININE 1.7* 1.5* 1.6*   CALCIUM 9.7 10.1 9.8     No results for input(s): AST, ALT, BILIDIR, BILITOT, ALKPHOS in the last 72 hours. No results for input(s): INR in the last 72 hours. Physical Exam Performed:    /84   Pulse 79   Temp 97.9 °F (36.6 °C) (Infrared)   Resp 18   Ht 5' 4\" (1.626 m)   Wt 148 lb 13 oz (67.5 kg)   SpO2 96%   BMI 25.54 kg/m²     General appearance:  No apparent distress, appears stated age and cooperative. Respiratory:  Normal respiratory effort. Clear to auscultation, bilaterally without Rales/Wheezes/Rhonchi. Cardiovascular:  Regular rate and rhythm with normal S1/S2 without murmurs, rubs or gallops. Abdomen:  Soft, non-tender, non-distended with normal bowel sounds. Musculoskelatal:  No edema  Neurologic:  Non-focal  Psychiatric:  Alert and oriented, normal insight  Skin:  No rashes or lesions. Capillary Refill:  Brisk, < 3 seconds. Peripheral Pulses:  +2 palpable, equal bilaterally.      Consults:    Nuria Banuelos MD

## 2023-01-09 NOTE — PROGRESS NOTES
Occupational Therapy    Chart reviewed, attempted to see pt for follow up treatment this date;   Unavailable off floor for EGD.     Cecilia Johnson

## 2023-01-09 NOTE — OP NOTE
Esophagogastroduodenoscopy Note    Patient:   Ashly Garcia    YOB: 1940    Facility:   [facility] [Inpatient]   Referring/PCP: Rashard Sánchez MD    Procedure:   Esophagogastroduodenoscopy --diagnostic  Date:     1/9/2023   Endoscopist:  Lorri Guerra MD     Preoperative Diagnosis: Epigastric pain exacerbated by swallowing, along w recent N/V (took Eliquis/Plavix today)    Postoperative Diagnosis: Narrow esophagus w black-colored mucosa in distal esophagus and distal ulcerative esophagitis (not biopsied or dilated due to plavix/Eliquis)    Anesthesia:  MAC  Estimated blood loss: None  Complications: None    Description of Procedure:  Informed consent was obtained from the patient after explanation of the procedure including indications, description of the procedure,  benefits and possible risks and complications of the procedure, and alternatives. Questions were answered. The patient's history was reviewed and a directed physical examination was performed prior to the procedure. Patient was monitored throughout the procedure with pulse oximetry and periodic assessment of vital signs. Patient was sedated as noted above. The Nursing staff and I performed a time out. With the patient in the left lateral decubitus position, the Olympus videoendoscope was placed in the patient's mouth and under direct visualization passed into the esophagus. The scope was ultimately passed to the third portion of the duodenum. Visualization was performed during both introduction and withdrawal of the endoscope and retroflexed view of the proximal stomach was obtained. Findings[de-identified]   Esophagus: Epigastric pain exacerbated by swallowing, along w recent N/V (took Eliquis/Plavix today). The findings do not support a diagnosis of Lubin's Esophagus.   Stomach: normal  Duodenum: normal    Recommendations: -Acid suppression with a proton pump inhibitor bid and needs repeat EGD for Bx and possible dilation when can be off Plavix/Eliquis for a few days    Sherice Davis MD       (O) [phone]        Sherice Davis MD, MD

## 2023-01-09 NOTE — PROGRESS NOTES
01/08/23 4976   NIV Type   $NIV $Daily Charge   Skin Assessment Redness (see comment/note)  (chin -redness)   Skin Protection for O2 Device Yes   Location Nose  (chin)   NIV Started/Stopped On   Equipment Type v60   Mode Bilevel   Mask Type Full face mask   Mask Size Medium   Settings/Measurements   PIP Observed 13 cm H20   IPAP 12 cmH20   CPAP/EPAP 6 cmH2O   Vt (Measured) 431 mL   Rate Ordered 16   Resp 25   FiO2  30 %   I Time/ I Time % 0.9 s   Minute Volume (L/min) 10.7 Liters   Mask Leak (lpm) 9 lpm   Comfort Level Good   Using Accessory Muscles No   SpO2 97   Patient's Home Machine No   Alarm Settings   Alarms On Y   Low Pressure (cmH2O) 6 cmH2O   High Pressure (cmH2O) 30 cmH2O   Apnea (secs) 20 secs   RR Low (bpm) 6   RR High (bpm) 45 br/min

## 2023-01-09 NOTE — ANESTHESIA PRE PROCEDURE
Department of Anesthesiology  Preprocedure Note       Name:  Ash White   Age:  81 y.o.  :  1940                                          MRN:  [id number]         Date:  2023      Surgeon: Wade Powers):  Gaston Arambula MD    Procedure: Procedure(s):  EGD DIAGNOSTIC ONLY    Medications prior to admission:   Prior to Admission medications    Medication Sig Start Date End Date Taking?  Authorizing Provider   apixaban (ELIQUIS) 2.5 MG TABS tablet Take 1 tablet by mouth 2 times daily 22   Lisa Hodge MD   atorvastatin (LIPITOR) 40 MG tablet Take 1 tablet by mouth nightly 22   Lisa Hodge MD   busPIRone (BUSPAR) 5 MG tablet Take 5 mg by mouth 3 times daily    Historical Provider, MD   Fluticasone-Umeclidin-Vilant (Ty Heckle) 200-62.5-25 MCG/INH AEPB Inhale 1 puff into the lungs daily    Historical Provider, MD   albuterol (PROVENTIL) (2.5 MG/3ML) 0.083% nebulizer solution Take 2.5 mg by nebulization every 4 hours as needed for Wheezing    Historical Provider, MD   sertraline (ZOLOFT) 50 MG tablet Take 50 mg by mouth daily    Historical Provider, MD   predniSONE (DELTASONE) 5 MG tablet Take 5 mg by mouth daily    Historical Provider, MD   guaiFENesin (MUCINEX) 600 MG extended release tablet Take 600 mg by mouth 2 times daily    Historical Provider, MD   Roflumilast (DALIRESP) 500 MCG tablet Take 500 mcg by mouth daily    Historical Provider, MD   OXYGEN Inhale 2 L into the lungs 21   Historical Provider, MD   ondansetron (ZOFRAN-ODT) 4 MG disintegrating tablet Take 1 tablet by mouth every 8 hours as needed for Nausea or Vomiting 21   Enma Langston MD   furosemide (LASIX) 20 MG tablet Take 1 tablet by mouth See Admin Instructions Tuesday, 21   Enma Langston MD   magnesium oxide (MAG-OX) 400 (241.3 Mg) MG TABS tablet Take 1 tablet by mouth daily 21   Enma Langston MD   fluticasone (FLONASE) 50 MCG/ACT nasal spray 1 spray by Each Nostril route daily as needed for Rhinitis 9/14/21   Jessica Cartwright MD   ferrous sulfate (IRON 325) 325 (65 Fe) MG tablet Take 325 mg by mouth daily (with breakfast)    Historical Provider, MD   acetaminophen (TYLENOL) 500 MG tablet Take 500 mg by mouth every 6 hours as needed for Pain    Historical Provider, MD   metoprolol tartrate (LOPRESSOR) 25 MG tablet Take 1 tablet by mouth 2 times daily 9/24/19   Yolanda Horan MD   docusate sodium (COLACE, DULCOLAX) 100 MG CAPS Take 100 mg by mouth 2 times daily  Patient not taking: Reported on 1/5/2023 9/24/19   Yolanda Horan MD   clopidogrel (PLAVIX) 75 MG tablet Take 1 tablet by mouth daily 9/7/19   Odalis Villarreal MD       Current medications:    Current Facility-Administered Medications   Medication Dose Route Frequency Provider Last Rate Last Admin    Benzocaine-Menthol (CEPACOL) 1 lozenge  1 lozenge Oral Q2H PRN Ninetta Mohs, MD        calcium carbonate (TUMS) chewable tablet 500 mg  500 mg Oral Q6H PRN Ninetta Mohs, MD   500 mg at 01/09/23 0941    methylPREDNISolone sodium (SOLU-MEDROL) injection 40 mg  40 mg IntraVENous Daily Ninetta Mohs, MD   40 mg at 01/09/23 0838    pantoprazole (PROTONIX) injection 40 mg  40 mg IntraVENous BID Ninetta Mohs, MD   40 mg at 01/09/23 4084    LORazepam (ATIVAN) tablet 0.5 mg  0.5 mg Oral Q6H PRN Ninetta Mohs, MD   0.5 mg at 01/08/23 2020    apixaban (ELIQUIS) tablet 2.5 mg  2.5 mg Oral BID Sarah Ponec MD   2.5 mg at 01/08/23 2020    busPIRone (BUSPAR) tablet 5 mg  5 mg Oral TID Sarah Ponce MD   5 mg at 01/09/23 0941    atorvastatin (LIPITOR) tablet 40 mg  40 mg Oral Nightly Sarah Ponce MD   40 mg at 01/08/23 2020    clopidogrel (PLAVIX) tablet 75 mg  75 mg Oral Daily Sarah Ponce MD   75 mg at 01/08/23 6380    docusate sodium (COLACE) capsule 100 mg  100 mg Oral BID PRN Sarah Ponce MD        ferrous sulfate (IRON 325) tablet 325 mg  325 mg Oral Daily with breakfast Sarah Ponce MD   325 mg at 01/09/23 0941    fluticasone (FLONASE) 50 MCG/ACT nasal spray 1 spray  1 spray Each Nostril Daily PRN Wolf Steward MD        guaiFENesin [facility]) extended release tablet 600 mg  600 mg Oral BID Wolf Steward MD   600 mg at 01/09/23 0941    magnesium oxide (MAG-OX) tablet 400 mg  400 mg Oral Daily Wolf Steward MD   400 mg at 01/09/23 0941    Roflumilast (DALIRESP) tablet 500 mcg  500 mcg Oral Daily Wolf Steward MD   500 mcg at 01/09/23 0941    sertraline (ZOLOFT) tablet 50 mg  50 mg Oral Daily Wolf Steward MD   50 mg at 01/09/23 0942    metoprolol tartrate (LOPRESSOR) tablet 25 mg  25 mg Oral BID Wolf Steward MD   25 mg at 01/09/23 0941    sodium chloride flush 0.9 % injection 10 mL  10 mL IntraVENous 2 times per day Wolf Steward MD   10 mL at 01/08/23 2021    sodium chloride flush 0.9 % injection 10 mL  10 mL IntraVENous PRN Wolf Steward MD        0.9 % sodium chloride infusion   IntraVENous PRN Wolf Steward MD        senna (SENOKOT) tablet 8.6 mg  1 tablet Oral Daily PRN Wolf Steward MD        polyethylene glycol (GLYCOLAX) packet 17 g  17 g Oral Daily PRN Wolf Steward MD        acetaminophen (TYLENOL) tablet 650 mg  650 mg Oral Q6H PRN Wolf Steward MD   650 mg at 01/09/23 0941    Or    acetaminophen (TYLENOL) suppository 650 mg  650 mg Rectal Q6H PRN Wolf Steward MD        doxycycline hyclate (VIBRA-TABS) tablet 100 mg  100 mg Oral Q12H Wolf Steward MD   100 mg at 01/09/23 0051    ondansetron (ZOFRAN-ODT) disintegrating tablet 4 mg  4 mg Oral Q8H PRN Wolf Steward MD        Or    ondansetron [facility]) injection 4 mg  4 mg IntraVENous Q6H PRN Wolf Steward MD   4 mg at 01/09/23 0521    mometasone-formoterol (DULERA) 200-5 MCG/ACT inhaler 2 puff  2 puff Inhalation BID Wolf Steward MD   2 puff at 01/09/23 0804    tiotropium (SPIRIVA RESPIMAT) 2.5 MCG/ACT inhaler 2 puff  2 puff Inhalation Daily Wolf Steward MD   2 puff at 01/09/23 7926    albuterol (PROVENTIL) nebulizer solution 2.5 mg  2.5 mg Nebulization Q4H WA Stefano Benedict MD   2.5 mg at 01/09/23 0804    perflutren lipid microspheres (DEFINITY) injection 1.5 mL  1.5 mL IntraVENous ONCE PRN Stefano Benedict MD        glucose chewable tablet 16 g  4 tablet Oral PRN Stefano Benedict MD        dextrose bolus 10% 125 mL  125 mL IntraVENous PRN Stefano Benedict MD        Or    dextrose bolus 10% 250 mL  250 mL IntraVENous PRN Stefano Benedict MD        glucagon (rDNA) injection 1 mg  1 mg SubCUTAneous PRN Stefano Benedict MD        dextrose 10 % infusion   IntraVENous Continuous PRN Stefano Benedict MD        insulin lispro (HUMALOG) injection vial 0-8 Units  0-8 Units SubCUTAneous TID WC Stefano Benedict MD        insulin lispro (HUMALOG) injection vial 0-4 Units  0-4 Units SubCUTAneous Nightly Stefano Benedict MD           Allergies:     Allergies   Allergen Reactions    Latex Other (See Comments)     Burning      Codeine Other (See Comments)     \"hallucinations\"       Problem List:    Patient Active Problem List   Diagnosis Code    Hyperlipidemia E78.5    Asthma J45.909    OA (osteoarthritis) M19.90    Tobacco use Z72.0    COPD exacerbation ([address] J44.1    Septicemia ([address] A41.9    Abnormal chest x-ray R93.89    COPD with acute exacerbation ([address] J44.1    Shortness of breath R06.02    Tobacco abuse Z72.0    Moderate malnutrition ([address] E44.0    NSTEMI (non-ST elevated myocardial infarction) ([address] I21.4    Acute respiratory failure with hypoxia (HCC) J96.01    CAD (coronary artery disease) I25.10    Acute pulmonary edema (HCC) J81.0    Pulmonary infiltrate R91.8    Elevated brain natriuretic peptide (BNP) level R79.89    Leukocytosis D72.829    Ischemic cardiomyopathy I25.5    Acute combined systolic and diastolic congestive heart failure (HCC) I50.41    Hypernatremia E87.0    NADJA (acute kidney injury) ([address] N17.9    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    Mucus plugging of bronchi T17.500A    CAD in native artery I25.10    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    Pneumonia of both lower lobes due to methicillin resistant Staphylococcus aureus (MRSA) ([facility]) J15.212    GI bleed K92.2    Severe malnutrition ([facility]) E43    Chest pain R07.9    Chronic respiratory failure with hypoxia ([facility]) J96.11    Primary hypertension I10    Anemia D64.9    Acute respiratory failure ([facility]) J96.00    Acute respiratory distress R06.03    Volume overload E87.70    Demand ischemia ([facility]) I24.8    GERD (gastroesophageal reflux disease) K21.9    Acute respiratory failure with hypoxia and hypercapnia ([facility]) J96.01, J96.02    Shock ([facility]) R57.9    Pneumonia due to infectious organism J18.9    Acute on chronic respiratory failure with hypoxia and hypercapnia ([facility]) J96.21, J96.22    Chronic obstructive pulmonary disease ([facility]) J44.9    Acute on chronic respiratory failure with hypoxemia ([facility]) J96.21    Chronic anxiety F41.9    Acute decompensated heart failure ([facility]) I50.9    Acute respiratory failure with hypoxia and hypercarbia ([facility]) J96.01, J96.02    Acute kidney injury ([facility]) N17.9    Elevated procalcitonin R79.89    COPD, severe ([facility]) J44.9    Anxiety F41.9    Severe anxiety F41.9    Severe protein-calorie malnutrition ([facility]) E43    HCAP (healthcare-associated pneumonia) J18.9    Pleural effusion J90    Hyperglycemia R73.9    Sepsis ([facility]) A41.9    Tachycardia R00.0    Acute hypoxemic respiratory failure ([facility]) J96.01    Septic shock ([facility]) A41.9, R65.21    Bacteremia R78.81    Urinary tract infection without hematuria N39.0    Atrial fibrillation with RVR ([facility]) I48.91    Electrolyte disorder E87.8       Past Medical History:        Diagnosis Date    [facility]) [facility]     Asthma     CAD (coronary artery disease)     CHF (congestive heart failure) ([facility])     unsure    Chronic anxiety     COPD (chronic obstructive pulmonary disease) ([facility])     GERD (gastroesophageal reflux disease) 9/9/2021    History of blood transfusion     Hyperlipidemia     Hypertension     MRSA (methicillin resistant staph aureus) culture positive 09/22/2019    + resp cx    OA (osteoarthritis) 8/12/2014    Thyroid disease        Past Surgical History:        Procedure Laterality Date    BRONCHOSCOPY  09/08/2019    Dr. Elida Elilott - w/BAL   [address]  09/05/2019    Dr. Christophe Mancuso N/A 2/24/2020    COLONOSCOPY DIAGNOSTIC performed by Josie Diaz DO at [facility] N/A 10/22/2021    COLONOSCOPY POLYPECTOMY SNARE/COLD BIOPSY performed by Jael Winston MD at [address] [place] [address] N/A 9/19/2019    OFF PUMP CORONARY ARTERY BYPASS GRAFTING X2, INTERNAL MAMMARY ARTERY, SAPHENOUS VEIN GRAFT performed by Fernanda Harley MD at [facility] CATH  9/20/2021     MIDLINE CATH 9/20/2021 [address] PROCEDURES    MASTECTOMY, PARTIAL Left     SIGMOIDOSCOPY  02/27/2020    4 bands    SIGMOIDOSCOPY N/A 2/27/2020    FLEX SIG W/ BANDING SIGMOIDOSCOPY DIAGNOSTIC FLEXIBLE performed by Josie Diaz DO at [address] [place] [address] History:    Social History     Tobacco Use    Smoking status: Former     Packs/day: 0.50     Years: 50.00     Pack years: 25.00     Types: Cigarettes     Quit date: 9/19/2019     Years since quitting: 3.3    Smokeless tobacco: Never    Tobacco comments:     quit 2018   Substance Use Topics    Alcohol use:  No                                Counseling given: Not Answered  Tobacco comments: quit 2018      Vital Signs (Current):   Vitals:    01/09/23 0805 01/09/23 0822 01/09/23 1038 01/09/23 1142   BP:  122/66 (!) 144/72 111/66   Pulse: 80 74 73 64   Resp:  20  18   Temp:  97.8 °F (36.6 °C)  97.9 °F (36.6 °C)   TempSrc:  Oral  Oral   SpO2: 98% 98% 96% 99%   Weight:       Height:                                                  BP Readings from Last 3 Encounters:   01/09/23 111/66   04/09/22 124/64   10/26/21 122/78       NPO Status:                                                                                 BMI:   Wt Readings from Last 3 Encounters:   01/09/23 148 lb 13 oz (67.5 kg)   04/09/22 127 lb 3.2 oz (57.7 kg)   10/26/21 99 lb 6 oz (45.1 kg)     Body mass index is 25.54 kg/m².     CBC:   Lab Results   Component Value Date/Time    WBC 9.4 01/09/2023 05:26 AM    RBC 4.24 01/09/2023 05:26 AM    HGB 13.1 01/09/2023 05:26 AM    HCT 39.6 01/09/2023 05:26 AM    MCV 93.5 01/09/2023 05:26 AM    RDW 13.8 01/09/2023 05:26 AM     01/09/2023 05:26 AM       CMP:   Lab Results   Component Value Date/Time     01/09/2023 05:26 AM    K 4.0 01/09/2023 05:26 AM    K 5.5 01/05/2023 05:31 AM     01/09/2023 05:26 AM    CO2 28 01/09/2023 05:26 AM    BUN 56 01/09/2023 05:26 AM    CREATININE 1.6 01/09/2023 05:26 AM    GFRAA 58 04/09/2022 05:15 AM    GFRAA >60 05/08/2013 06:09 AM    AGRATIO 0.9 01/05/2023 05:31 AM    LABGLOM 32 01/09/2023 05:26 AM    GLUCOSE 94 01/09/2023 05:26 AM    PROT 8.2 01/05/2023 05:31 AM    PROT 7.81 12/20/2012 05:42 PM    CALCIUM 9.8 01/09/2023 05:26 AM    BILITOT 0.3 01/05/2023 05:31 AM    ALKPHOS 71 01/05/2023 05:31 AM    AST 13 01/05/2023 05:31 AM    ALT 7 01/05/2023 05:31 AM       POC Tests:   Recent Labs     01/09/23  1139   POCGLU 116*       Coags:   Lab Results   Component Value Date/Time    PROTIME 10.0 10/25/2021 04:18 AM    INR 0.89 10/25/2021 04:18 AM    APTT 30.4 09/28/2021 07:06 AM       HCG (If Applicable): No results found for: PREGTESTUR, PREGSERUM, HCG, HCGQUANT     ABGs:   Lab Results   Component Value Date/Time    PHART 7.439 04/05/2022 05:10 AM    PO2ART 83.9 04/05/2022 05:10 AM    JFW4LSG 38.9 04/05/2022 05:10 AM    KZL7XZT 25.8 04/05/2022 05:10 AM    BEART 1.6 04/05/2022 05:10 AM    H8INSTVZ 96.6 04/05/2022 05:10 AM        Type & Screen (If Applicable):  No results found for: LABABO, [address]    Drug/Infectious Status (If Applicable):  No results found for: HIV, HEPCAB    COVID-19 Screening (If Applicable):   Lab Results   Component Value Date/Time    COVID19 NOT DETECTED 01/04/2023 01:40 PM           Anesthesia Evaluation  Patient summary reviewed and Nursing notes reviewed  Airway: Mallampati: II          Dental: normal exam   (+) upper dentures and lower dentures      Pulmonary:normal exam    (+) pneumonia:  COPD:  shortness of breath:  asthma:                            Cardiovascular:    (+) hypertension:, past MI:, CAD:, CABG/stent:, dysrhythmias:, CHF (LVEF 40% with global hypokinesis):,                   Neuro/Psych:   (+) neuromuscular disease:,             GI/Hepatic/Renal:   (+) GERD:,           Endo/Other:                     Abdominal:             Vascular: negative vascular ROS. Other Findings:           Anesthesia Plan      MAC     ASA 4       Induction: intravenous. Anesthetic plan and risks discussed with patient. Plan discussed with CRNA.     Attending anesthesiologist reviewed and agrees with Preprocedure content                HANK Riley MD   1/9/2023

## 2023-01-09 NOTE — PROGRESS NOTES
Dtg at bedside. Patient transferred to floor via stretcher on 3 l n/c with 1500 psi in tank verified with transporter, Aleksandar.

## 2023-01-09 NOTE — PROGRESS NOTES
Patient to [facility] for EGD. [facility] aware.  Eliquis and Plavix held this AM.

## 2023-01-10 LAB
ANION GAP SERPL CALCULATED.3IONS-SCNC: 12 MMOL/L (ref 3–16)
BUN BLDV-MCNC: 49 MG/DL (ref 7–20)
CALCIUM SERPL-MCNC: 9.5 MG/DL (ref 8.3–10.6)
CHLORIDE BLD-SCNC: 105 MMOL/L (ref 99–110)
CO2: 26 MMOL/L (ref 21–32)
CREAT SERPL-MCNC: 1.4 MG/DL (ref 0.6–1.2)
GFR SERPL CREATININE-BSD FRML MDRD: 38 ML/MIN/{1.73_M2}
GLUCOSE BLD-MCNC: 105 MG/DL (ref 70–99)
GLUCOSE BLD-MCNC: 95 MG/DL (ref 70–99)
GLUCOSE BLD-MCNC: 95 MG/DL (ref 70–99)
GLUCOSE BLD-MCNC: 98 MG/DL (ref 70–99)
GLUCOSE BLD-MCNC: 98 MG/DL (ref 70–99)
HCT VFR BLD CALC: 37.9 % (ref 36–48)
HEMOGLOBIN: 12.8 G/DL (ref 12–16)
MCH RBC QN AUTO: 31.6 PG (ref 26–34)
MCHC RBC AUTO-ENTMCNC: 33.7 G/DL (ref 31–36)
MCV RBC AUTO: 93.9 FL (ref 80–100)
PDW BLD-RTO: 13.8 % (ref 12.4–15.4)
PERFORMED ON: ABNORMAL
PERFORMED ON: NORMAL
PLATELET # BLD: 245 K/UL (ref 135–450)
PMV BLD AUTO: 7.9 FL (ref 5–10.5)
POTASSIUM SERPL-SCNC: 3.9 MMOL/L (ref 3.5–5.1)
RBC # BLD: 4.04 M/UL (ref 4–5.2)
SODIUM BLD-SCNC: 143 MMOL/L (ref 136–145)
WBC # BLD: 10.7 K/UL (ref 4–11)

## 2023-01-10 PROCEDURE — 97530 THERAPEUTIC ACTIVITIES: CPT

## 2023-01-10 PROCEDURE — 2580000003 HC RX 258: Performed by: HOSPITALIST

## 2023-01-10 PROCEDURE — 94761 N-INVAS EAR/PLS OXIMETRY MLT: CPT

## 2023-01-10 PROCEDURE — 6360000002 HC RX W HCPCS: Performed by: HOSPITALIST

## 2023-01-10 PROCEDURE — 36415 COLL VENOUS BLD VENIPUNCTURE: CPT

## 2023-01-10 PROCEDURE — 85027 COMPLETE CBC AUTOMATED: CPT

## 2023-01-10 PROCEDURE — 2060000000 HC ICU INTERMEDIATE R&B

## 2023-01-10 PROCEDURE — 97110 THERAPEUTIC EXERCISES: CPT

## 2023-01-10 PROCEDURE — 6360000002 HC RX W HCPCS: Performed by: INTERNAL MEDICINE

## 2023-01-10 PROCEDURE — 94669 MECHANICAL CHEST WALL OSCILL: CPT

## 2023-01-10 PROCEDURE — 80048 BASIC METABOLIC PNL TOTAL CA: CPT

## 2023-01-10 PROCEDURE — 2700000000 HC OXYGEN THERAPY PER DAY

## 2023-01-10 PROCEDURE — 94640 AIRWAY INHALATION TREATMENT: CPT

## 2023-01-10 PROCEDURE — 1200000000 HC SEMI PRIVATE

## 2023-01-10 PROCEDURE — C9113 INJ PANTOPRAZOLE SODIUM, VIA: HCPCS | Performed by: INTERNAL MEDICINE

## 2023-01-10 PROCEDURE — 6370000000 HC RX 637 (ALT 250 FOR IP): Performed by: HOSPITALIST

## 2023-01-10 PROCEDURE — 6370000000 HC RX 637 (ALT 250 FOR IP): Performed by: INTERNAL MEDICINE

## 2023-01-10 PROCEDURE — 94660 CPAP INITIATION&MGMT: CPT

## 2023-01-10 RX ORDER — FAMOTIDINE 20 MG/1
20 TABLET, FILM COATED ORAL DAILY
Status: DISCONTINUED | OUTPATIENT
Start: 2023-01-11 | End: 2023-01-15 | Stop reason: HOSPADM

## 2023-01-10 RX ADMIN — SUCRALFATE 1 G: 1 TABLET ORAL at 01:31

## 2023-01-10 RX ADMIN — PANTOPRAZOLE SODIUM 40 MG: 40 INJECTION, POWDER, FOR SOLUTION INTRAVENOUS at 21:41

## 2023-01-10 RX ADMIN — GUAIFENESIN 600 MG: 600 TABLET, EXTENDED RELEASE ORAL at 21:41

## 2023-01-10 RX ADMIN — SUCRALFATE 1 G: 1 TABLET ORAL at 06:06

## 2023-01-10 RX ADMIN — METOPROLOL TARTRATE 25 MG: 25 TABLET, FILM COATED ORAL at 21:41

## 2023-01-10 RX ADMIN — ALBUTEROL SULFATE 2.5 MG: 2.5 SOLUTION RESPIRATORY (INHALATION) at 16:25

## 2023-01-10 RX ADMIN — BUSPIRONE HYDROCHLORIDE 5 MG: 5 TABLET ORAL at 21:41

## 2023-01-10 RX ADMIN — PANTOPRAZOLE SODIUM 40 MG: 40 INJECTION, POWDER, FOR SOLUTION INTRAVENOUS at 08:36

## 2023-01-10 RX ADMIN — FAMOTIDINE 20 MG: 20 TABLET, FILM COATED ORAL at 08:35

## 2023-01-10 RX ADMIN — Medication 2 PUFF: at 20:00

## 2023-01-10 RX ADMIN — DOXYCYCLINE HYCLATE 100 MG: 100 TABLET, COATED ORAL at 01:31

## 2023-01-10 RX ADMIN — TIOTROPIUM BROMIDE INHALATION SPRAY 2 PUFF: 3.12 SPRAY, METERED RESPIRATORY (INHALATION) at 09:03

## 2023-01-10 RX ADMIN — FERROUS SULFATE TAB 325 MG (65 MG ELEMENTAL FE) 325 MG: 325 (65 FE) TAB at 08:35

## 2023-01-10 RX ADMIN — ANTACID TABLETS 500 MG: 500 TABLET, CHEWABLE ORAL at 01:31

## 2023-01-10 RX ADMIN — ATORVASTATIN CALCIUM 40 MG: 40 TABLET, FILM COATED ORAL at 21:41

## 2023-01-10 RX ADMIN — BUSPIRONE HYDROCHLORIDE 5 MG: 5 TABLET ORAL at 08:37

## 2023-01-10 RX ADMIN — GUAIFENESIN 600 MG: 600 TABLET, EXTENDED RELEASE ORAL at 08:35

## 2023-01-10 RX ADMIN — ALBUTEROL SULFATE 2.5 MG: 2.5 SOLUTION RESPIRATORY (INHALATION) at 19:55

## 2023-01-10 RX ADMIN — ALBUTEROL SULFATE 2.5 MG: 2.5 SOLUTION RESPIRATORY (INHALATION) at 12:07

## 2023-01-10 RX ADMIN — SODIUM CHLORIDE, PRESERVATIVE FREE 10 ML: 5 INJECTION INTRAVENOUS at 08:35

## 2023-01-10 RX ADMIN — SUCRALFATE 1 G: 1 TABLET ORAL at 17:31

## 2023-01-10 RX ADMIN — SERTRALINE HYDROCHLORIDE 50 MG: 50 TABLET ORAL at 08:35

## 2023-01-10 RX ADMIN — METHYLPREDNISOLONE SODIUM SUCCINATE 40 MG: 40 INJECTION, POWDER, FOR SOLUTION INTRAMUSCULAR; INTRAVENOUS at 08:36

## 2023-01-10 RX ADMIN — ROFLUMILAST 500 MCG: 500 TABLET ORAL at 08:35

## 2023-01-10 RX ADMIN — METOPROLOL TARTRATE 25 MG: 25 TABLET, FILM COATED ORAL at 08:35

## 2023-01-10 RX ADMIN — Medication 400 MG: at 08:35

## 2023-01-10 RX ADMIN — ANTACID TABLETS 500 MG: 500 TABLET, CHEWABLE ORAL at 08:34

## 2023-01-10 RX ADMIN — ANTACID TABLETS 500 MG: 500 TABLET, CHEWABLE ORAL at 21:41

## 2023-01-10 RX ADMIN — Medication 2 PUFF: at 09:02

## 2023-01-10 RX ADMIN — ALBUTEROL SULFATE 2.5 MG: 2.5 SOLUTION RESPIRATORY (INHALATION) at 09:01

## 2023-01-10 ASSESSMENT — PAIN SCALES - GENERAL: PAINLEVEL_OUTOF10: 3

## 2023-01-10 NOTE — CARE COORDINATION
Chart reviewed. Pt is 81year old female admitted as inpatient for severe esophagitis and GERD. GI following. EGD yesterday. Unable to Bx due to Eliquis and Plavix. Planning to hold these and repeat EGD in several days. Ongoing pain, severe at times. Plan remains home with Jeremías Raman. [facility] following. Pt from home with son. SHIRIN. [employer] for home o2 2.5 lpm baseline. Pt at baseline now. Follow.

## 2023-01-10 NOTE — PROGRESS NOTES
PROGRESS NOTE    HPI: Lisa Navarro is a(n)82 y.o. female admitted for work-up and treatment for ZENDEJAS (dyspnea on exertion) [R06.09]  COPD exacerbation ([facility] [J44.1]  Acute kidney injury ([facility] [N17.9]  Transient hypotension [I95.9]  Acute on chronic respiratory failure with hypoxia (HCC) [J96.21]  Acute on chronic respiratory failure with hypoxia and hypercapnia ([address] [J96.21, J96.22]. We are following for odynophagia. Subjective:     No new complaints. Continues to complain of pain with swallowing. Objective:     No intake/output data recorded. /68   Pulse 70   Temp 97.4 °F (36.3 °C) (Axillary)   Resp 18   Ht 5' 4\" (1.626 m)   Wt 148 lb 13 oz (67.5 kg)   SpO2 97%   BMI 25.54 kg/m²     Physical Exam:  HEENT: anicteric sclera, oropharyngeal membranes pink and moist.  Cor: RRR  Lungs: non-labored, no respiratory distress  Abdomen: soft, NT. No ascites. No hepatomegaly or splenomegaly  Extremities: no edema  Neuro: alert and oriented x 3, no asterixis      Results:   Lab Results   Component Value Date    ALT 7 (L) 01/05/2023    AST 13 (L) 01/05/2023    ALKPHOS 71 01/05/2023    BILIDIR <0.2 09/19/2019    PROT 8.2 01/05/2023    LABALBU 3.8 01/05/2023    INR 0.89 10/25/2021    LIPASE 54.0 01/04/2023     Lab Results   Component Value Date    WBC 10.7 01/10/2023    HGB 12.8 01/10/2023    HCT 37.9 01/10/2023    MCV 93.9 01/10/2023     01/10/2023     BUN/Cr/glu/ALT/AST/amyl/lip:  49/1.4/--/--/--/--/-- (01/10 0557)  CT CHEST WO CONTRAST    Result Date: 1/4/2023  Emphysema. There be early fibrotic changes. This is not typical for UIP. XR CHEST PORTABLE    Result Date: 1/4/2023  No acute cardiopulmonary findings         Impression:  Epigastric pain, worse with swallowing. S/p EGD 1/9 with findings of  narrowed esophagus with distal ulcerative esophagitis and black colored mucosa.   Not biopsies as her AC and antiplatelet meds were noted held. 2. A-fib. On eliquis. 3. CAD on plavix. S/p CABG. 4. Respiratory failure secondary to COPD, pulmonary edema. Down to 2 L O2, baseline. Plan: Will need EGD, ideally after holding Plavix for one week per doctor Kimberlyn Estrada. Patient has logistical barriers to follow-up and family requesting the procedure be done before discharge this Friday. Eliquis and Plavix will be held 5 days on Friday, however increased risk of bleeding remains in setting of esophagitis. If family understands risk and agreeable, Dr. Kimberlyn Estrada willing to proceed with procedure Monday. Continue PPI bid. Carafate slurries qid. Please do not hesitate to call with questions or concerns.       Electronically signed by: CLIFTON Verdugo 1/10/2023 9:21 AM

## 2023-01-10 NOTE — PROGRESS NOTES
Occupational Therapy  Facility/Department: [facility] C4 PCU  Treatment Note    Name: Jess Abdullahi  : 1940  MRN: [id number]  Date of Service: 1/10/2023    Discharge Recommendations:  24 hour supervision or assist, Home with Home health OT          Patient Diagnosis(es): The primary encounter diagnosis was COPD exacerbation ([address] Diagnoses of Acute on chronic respiratory failure with hypoxia (HCC), ZENDEJAS (dyspnea on exertion), Acute kidney injury ([address] and Transient hypotension were also pertinent to this visit. Past Medical History:  has a past medical history of NADJA (acute kidney injury) ([facility] Asthma, CAD (coronary artery disease), CHF (congestive heart failure) ([address] Chronic anxiety, COPD (chronic obstructive pulmonary disease) ([facility] GERD (gastroesophageal reflux disease), History of blood transfusion, Hyperlipidemia, Hypertension, MRSA (methicillin resistant staph aureus) culture positive, OA (osteoarthritis), and Thyroid disease. Past Surgical History:  has a past surgical history that includes  section; Mastectomy, partial (Left); bronchoscopy (2019); Cardiac catheterization (2019); Coronary artery bypass graft (N/A, 2019); Colonoscopy (N/A, 2020); Sigmoidoscopy (2020); sigmoidoscopy (N/A, 2020); IR MIDLINE CATH (2021); Colonoscopy (N/A, 10/22/2021); and Upper gastrointestinal endoscopy (N/A, 2023). Assessment   Performance deficits / Impairments: Decreased functional mobility ; Decreased safe awareness;Decreased endurance;Decreased strength;Decreased ADL status; Decreased high-level IADLs  Assessment: pt from home with HHA 5 days/week, 8 hrs/day, family at night, normally independent with BADL's & functional mobility without AD on 2. 5 L O 2; pt admitted with acute on chronic COPD, now requiring mod assist with LE dressing to knees, standing tolerance limited to ~ 30 seconds with increased dyspnea & esophageal pain at rest & with exertion; pt cooperative & continues to benefit from skilled OT services;  REQUIRES OT FOLLOW-UP: Yes  Activity Tolerance  Activity Tolerance: Patient limited by fatigue;Patient limited by pain  Activity Tolerance Comments: sitting in chair on 3 L O 2 after bathroom mobility BP = 106/61, HR = 72, 98 % O 2 sats;        Plan   Occupational Therapy Plan  Times Per Week: 2-4x/ week  Current Treatment Recommendations: Balance training, Functional mobility training, Endurance training, Positioning, Self-Care / ADL, Safety education & training     Restrictions  Restrictions/Precautions  Restrictions/Precautions: Fall Risk, General Precautions, Contact Precautions (MDRO)  Position Activity Restriction  Other position/activity restrictions: telemetry, 3 L O 2;    Subjective   General  Chart Reviewed: Yes  Patient assessed for rehabilitation services?: Yes  Family / Caregiver Present: No  Referring Practitioner: Philip Miranda MD 1/05/23  Diagnosis: Acute COPD exacerbation  Subjective  Subjective: pt c/o \"esophagus\" pain at rest & with mobility, RN aware  General Comment  Comments: RN cleared pt for OT; pt requiring minimal encouragement to get up to chair/bathroom         Objective                Safety Devices  Type of Devices: Left in chair;Call light within reach; Chair alarm in place;Nurse notified     Toilet Transfers  Toilet - Technique: Ambulating (CGA without AD)  Equipment Used: Grab bars  Toilet Transfer: Modified independent    AROM: Generally decreased, functional  Strength: Generally decreased, functional  Coordination: Within functional limits  Tone: Normal    ADL  Grooming: Stand by assistance (about 30 seconds at sink to wash hands after toileting due increased SOB upon exertion on 3 L O 2)  LE Dressing:  Moderate assistance (to lorena non-skid socks on & thread briefs on feet; SBA standing to manage briefs)  Toileting: Supervision        Bed mobility  Supine to Sit: Minimal assistance  Sit to Supine: Unable to assess (Left up in chair upon exiting, pt agreeable)  Transfers  Sit to stand: Contact guard assistance  Stand to sit: Contact guard assistance  Transfer Comments: increased SOB with minimal exertion, unable to stand at sink without c/o fatigue     Cognition  Overall Cognitive Status: Exceptions  Arousal/Alertness: Delayed responses to stimuli (d/t [place])  Following Commands:  Follows one step commands with increased time  Attention Span: Attends with cues to redirect  Memory: Appears intact  Safety Judgement: Decreased awareness of need for assistance (incontinent of urine/purewick not working)  Insights: Decreased awareness of deficits  Initiation: Requires cues for some  Sequencing: Does not require cues  Orientation  Overall Orientation Status: Within Functional Limits  Orientation Level: Oriented X4               Exercise Treatment: BUE & BLE AROM exercises  Hand flex/ext: x  12  Reps  Elbow flex/ext:  x   12 Reps  Forearm sup/pron:  x 12   Reps  Scap elev/depr:  x   12 Reps         Education Given To: Patient  Education Provided: Role of Therapy;Plan of Care;Precautions  Education Provided Comments: disease specific: importance of OOB to chair for meals/short periods of time; use of RED/nurse call light for assist with need/transfers;  Education Method: Demonstration;Verbal  Barriers to Learning: None  Education Outcome: Verbalized understanding;Demonstrated understanding;Continued education needed                 AM-PAC Score        AM-PAC Inpatient Daily Activity Raw Score: 15 (01/10/23 1613)  AM-PAC Inpatient ADL T-Scale Score : 34.69 (01/10/23 1613)  ADL Inpatient CMS 0-100% Score: 56.46 (01/10/23 1613)  ADL Inpatient CMS G-Code Modifier : CK (01/10/23 1613)    Tinneti Score       Goals  Short Term Goals  Time Frame for Short Term Goals: 1 week 1/12  Short Term Goal 1: Pt to voice understanding with teach-back of 1/4 heart failure goals (see separate \"heart failure\" therapy note for details)-GOAL MET 1/5/23    Short Term Goal 2: Pt will complete LE dressing with SBA by 1/10; 1/10 mod assist with LE dressing, continue goal to 1-13-23    Short Term Goal 3: PT will complete toilet transfers with Prabhu; 1/10 STG met    Short Term Goal 4: Pt will complete standing level ADLs with SBA for balance; 1/10 CGA standing ADL's for 30 seconds;   Patient Goals   Patient goals : \"to go home\"       Therapy Time   Individual Concurrent Group Co-treatment   Time In [address]         Time Out 1506         Minutes . Missy Mancia [address] [place] [address]

## 2023-01-10 NOTE — PROGRESS NOTES
01/10/23 0331   NIV Type   $NIV $Daily Charge   NIV Started/Stopped On   Equipment Type v60   Mode Bilevel   Mask Type Full face mask   Mask Size Medium   Settings/Measurements   IPAP 12 cmH20   CPAP/EPAP 6 cmH2O   Rate Ordered 16   Resp 23   FiO2  30 %   Mask Leak (lpm) 0 lpm   Comfort Level Good   Using Accessory Muscles No   Alarm Settings   Alarms On Y   Low Pressure (cmH2O) 6 cmH2O   High Pressure (cmH2O) 30 cmH2O

## 2023-01-10 NOTE — PROGRESS NOTES
Hospitalist Progress Note    Assessment/Plan:    -Chest pain 2/2 GERD/Severe Esophagitis: GI consulted. EGD showed severe esophagitis, black discoloration. Unable to biopsy d/t Eliquis and Plavix. Continue to hold these with plan for repeat EGD on Friday. Discussed with GI. Patient/family concerned about logistics of outpatient testing. Symptoms remain severe. Continue IV PPI twice daily. Add Pepcid and Carafate slurry. Tums PRN. --Acute COPD exacerbation: Improving. Prednisone switched for IV Solu-Medrol due to GI complaints; can likely stop soon. Continue scheduled and as needed DuoNebs. Supplemental oxygen. --Presumed flash pulmonary edema: Treated with IV Lasix x3 doses. Clinically resolved. --Acute hypoxic and hypercapnic respiratory failure: Was on BiPAP overnight, currently weaned off. Now on 2 L of NC O2 which is baseline requirement for her. --Paroxysmal A. fib: Fairly rate controlled at this point. Continue outpatient medication as ordered. Patient is on Eliquis for anticoagulation    --DM type II: Continue subcu insulin regimen including long-acting and short acting insulin as ordered, continue SSI protocol for more adequate glycemic control.    --HTN, controlled, cont home meds as ordered     --GERD: Cont home med     --HLD: Continue current statin therapy      --Anxiety/Depression: Patient seems very anxious shakiness appears to be more related to this than the effect of steroid. Will place on as needed Ativan       DVT Prophylaxis: Hold Eliquis  Diet: ADULT DIET; Full Liquid  Code Status: Full Code  PT/OT Eval Status: As needed    Dispo - Several more days pending repeat EGD    Appropriate for A1 Discharge Unit: No      Date of Admission: 1/4/2023    Chief Complaint: Chest pain    Subjective: Limited PO intake. Ongoing pain.      Labs:   Recent Labs     01/08/23  0455 01/09/23  0526 01/10/23  0557   WBC 11.1* 9.4 10.7   HGB 13.5 13.1 12.8   HCT 41.1 39.6 37.9    239 245       Recent Labs     01/08/23  0455 01/09/23  0526 01/10/23  0557    139 143   K 3.6 4.0 3.9    100 105   CO2 28 28 26   BUN 55* 56* 49*   CREATININE 1.5* 1.6* 1.4*   CALCIUM 10.1 9.8 9.5       No results for input(s): AST, ALT, BILIDIR, BILITOT, ALKPHOS in the last 72 hours. No results for input(s): INR in the last 72 hours. Physical Exam Performed:    /68   Pulse 70   Temp 97.4 °F (36.3 °C) (Axillary)   Resp 18   Ht 5' 4\" (1.626 m)   Wt 148 lb 13 oz (67.5 kg)   SpO2 97%   BMI 25.54 kg/m²     General appearance:  No apparent distress, appears stated age and cooperative. Respiratory:  Normal respiratory effort. Clear to auscultation, bilaterally without Rales/Wheezes/Rhonchi. Cardiovascular:  Regular rate and rhythm with normal S1/S2 without murmurs, rubs or gallops. Abdomen:  Soft, non-tender, non-distended with normal bowel sounds. Musculoskelatal:  No edema  Neurologic:  Non-focal  Psychiatric:  Alert and oriented, normal insight  Skin:  No rashes or lesions. Capillary Refill:  Brisk, < 3 seconds. Peripheral Pulses:  +2 palpable, equal bilaterally.      Consults:    Ivone Grace MD

## 2023-01-11 LAB
ANION GAP SERPL CALCULATED.3IONS-SCNC: 11 MMOL/L (ref 3–16)
BUN BLDV-MCNC: 39 MG/DL (ref 7–20)
CALCIUM SERPL-MCNC: 9.4 MG/DL (ref 8.3–10.6)
CHLORIDE BLD-SCNC: 103 MMOL/L (ref 99–110)
CO2: 25 MMOL/L (ref 21–32)
CREAT SERPL-MCNC: 1.4 MG/DL (ref 0.6–1.2)
GFR SERPL CREATININE-BSD FRML MDRD: 38 ML/MIN/{1.73_M2}
GLUCOSE BLD-MCNC: 100 MG/DL (ref 70–99)
GLUCOSE BLD-MCNC: 101 MG/DL (ref 70–99)
GLUCOSE BLD-MCNC: 120 MG/DL (ref 70–99)
GLUCOSE BLD-MCNC: 126 MG/DL (ref 70–99)
GLUCOSE BLD-MCNC: 141 MG/DL (ref 70–99)
HCT VFR BLD CALC: 38.2 % (ref 36–48)
HEMOGLOBIN: 12.3 G/DL (ref 12–16)
MCH RBC QN AUTO: 30.3 PG (ref 26–34)
MCHC RBC AUTO-ENTMCNC: 32.1 G/DL (ref 31–36)
MCV RBC AUTO: 94.4 FL (ref 80–100)
PDW BLD-RTO: 13.7 % (ref 12.4–15.4)
PERFORMED ON: ABNORMAL
PLATELET # BLD: 249 K/UL (ref 135–450)
PMV BLD AUTO: 8.1 FL (ref 5–10.5)
POTASSIUM SERPL-SCNC: 4.5 MMOL/L (ref 3.5–5.1)
RBC # BLD: 4.05 M/UL (ref 4–5.2)
SODIUM BLD-SCNC: 139 MMOL/L (ref 136–145)
WBC # BLD: 9.4 K/UL (ref 4–11)

## 2023-01-11 PROCEDURE — 94761 N-INVAS EAR/PLS OXIMETRY MLT: CPT

## 2023-01-11 PROCEDURE — 6360000002 HC RX W HCPCS: Performed by: HOSPITALIST

## 2023-01-11 PROCEDURE — 2700000000 HC OXYGEN THERAPY PER DAY

## 2023-01-11 PROCEDURE — 94660 CPAP INITIATION&MGMT: CPT

## 2023-01-11 PROCEDURE — C9113 INJ PANTOPRAZOLE SODIUM, VIA: HCPCS | Performed by: INTERNAL MEDICINE

## 2023-01-11 PROCEDURE — 1200000000 HC SEMI PRIVATE

## 2023-01-11 PROCEDURE — 2580000003 HC RX 258: Performed by: HOSPITALIST

## 2023-01-11 PROCEDURE — 6360000002 HC RX W HCPCS: Performed by: INTERNAL MEDICINE

## 2023-01-11 PROCEDURE — 6370000000 HC RX 637 (ALT 250 FOR IP): Performed by: INTERNAL MEDICINE

## 2023-01-11 PROCEDURE — 94669 MECHANICAL CHEST WALL OSCILL: CPT

## 2023-01-11 PROCEDURE — 85027 COMPLETE CBC AUTOMATED: CPT

## 2023-01-11 PROCEDURE — 94640 AIRWAY INHALATION TREATMENT: CPT

## 2023-01-11 PROCEDURE — 36415 COLL VENOUS BLD VENIPUNCTURE: CPT

## 2023-01-11 PROCEDURE — 6370000000 HC RX 637 (ALT 250 FOR IP): Performed by: HOSPITALIST

## 2023-01-11 PROCEDURE — 80048 BASIC METABOLIC PNL TOTAL CA: CPT

## 2023-01-11 RX ORDER — LIDOCAINE HYDROCHLORIDE 40 MG/ML
4 SOLUTION TOPICAL PRN
Status: DISCONTINUED | OUTPATIENT
Start: 2023-01-11 | End: 2023-01-11 | Stop reason: ALTCHOICE

## 2023-01-11 RX ORDER — LIDOCAINE HYDROCHLORIDE 20 MG/ML
15 SOLUTION OROPHARYNGEAL PRN
Status: DISCONTINUED | OUTPATIENT
Start: 2023-01-11 | End: 2023-01-15 | Stop reason: HOSPADM

## 2023-01-11 RX ADMIN — ALBUTEROL SULFATE 2.5 MG: 2.5 SOLUTION RESPIRATORY (INHALATION) at 15:40

## 2023-01-11 RX ADMIN — ALBUTEROL SULFATE 2.5 MG: 2.5 SOLUTION RESPIRATORY (INHALATION) at 07:29

## 2023-01-11 RX ADMIN — BUSPIRONE HYDROCHLORIDE 5 MG: 5 TABLET ORAL at 08:32

## 2023-01-11 RX ADMIN — SUCRALFATE 1 G: 1 TABLET ORAL at 17:11

## 2023-01-11 RX ADMIN — METHYLPREDNISOLONE SODIUM SUCCINATE 40 MG: 40 INJECTION, POWDER, FOR SOLUTION INTRAMUSCULAR; INTRAVENOUS at 08:32

## 2023-01-11 RX ADMIN — ACETAMINOPHEN 325MG 650 MG: 325 TABLET ORAL at 22:03

## 2023-01-11 RX ADMIN — Medication 400 MG: at 08:32

## 2023-01-11 RX ADMIN — METOPROLOL TARTRATE 25 MG: 25 TABLET, FILM COATED ORAL at 08:33

## 2023-01-11 RX ADMIN — ALBUTEROL SULFATE 2.5 MG: 2.5 SOLUTION RESPIRATORY (INHALATION) at 19:25

## 2023-01-11 RX ADMIN — PANTOPRAZOLE SODIUM 40 MG: 40 INJECTION, POWDER, FOR SOLUTION INTRAVENOUS at 22:06

## 2023-01-11 RX ADMIN — METOPROLOL TARTRATE 25 MG: 25 TABLET, FILM COATED ORAL at 22:03

## 2023-01-11 RX ADMIN — FAMOTIDINE 20 MG: 20 TABLET, FILM COATED ORAL at 08:33

## 2023-01-11 RX ADMIN — ANTACID TABLETS 500 MG: 500 TABLET, CHEWABLE ORAL at 22:03

## 2023-01-11 RX ADMIN — SODIUM CHLORIDE, PRESERVATIVE FREE 10 ML: 5 INJECTION INTRAVENOUS at 08:33

## 2023-01-11 RX ADMIN — GUAIFENESIN 600 MG: 600 TABLET, EXTENDED RELEASE ORAL at 08:33

## 2023-01-11 RX ADMIN — LORAZEPAM 0.5 MG: 0.5 TABLET ORAL at 22:03

## 2023-01-11 RX ADMIN — SERTRALINE HYDROCHLORIDE 50 MG: 50 TABLET ORAL at 08:33

## 2023-01-11 RX ADMIN — BUSPIRONE HYDROCHLORIDE 5 MG: 5 TABLET ORAL at 14:51

## 2023-01-11 RX ADMIN — BUSPIRONE HYDROCHLORIDE 5 MG: 5 TABLET ORAL at 22:03

## 2023-01-11 RX ADMIN — PANTOPRAZOLE SODIUM 40 MG: 40 INJECTION, POWDER, FOR SOLUTION INTRAVENOUS at 08:33

## 2023-01-11 RX ADMIN — GUAIFENESIN 600 MG: 600 TABLET, EXTENDED RELEASE ORAL at 22:03

## 2023-01-11 RX ADMIN — Medication 2 PUFF: at 07:29

## 2023-01-11 RX ADMIN — Medication 2 PUFF: at 19:25

## 2023-01-11 RX ADMIN — ATORVASTATIN CALCIUM 40 MG: 40 TABLET, FILM COATED ORAL at 22:03

## 2023-01-11 RX ADMIN — ALBUTEROL SULFATE 2.5 MG: 2.5 SOLUTION RESPIRATORY (INHALATION) at 11:45

## 2023-01-11 RX ADMIN — FERROUS SULFATE TAB 325 MG (65 MG ELEMENTAL FE) 325 MG: 325 (65 FE) TAB at 08:32

## 2023-01-11 RX ADMIN — ROFLUMILAST 500 MCG: 500 TABLET ORAL at 08:32

## 2023-01-11 RX ADMIN — SUCRALFATE 1 G: 1 TABLET ORAL at 06:48

## 2023-01-11 RX ADMIN — TIOTROPIUM BROMIDE INHALATION SPRAY 2 PUFF: 3.12 SPRAY, METERED RESPIRATORY (INHALATION) at 07:31

## 2023-01-11 RX ADMIN — SUCRALFATE 1 G: 1 TABLET ORAL at 11:18

## 2023-01-11 NOTE — PROGRESS NOTES
Hospitalist Progress Note    Assessment/Plan:    -Chest pain 2/2 GERD/Severe Esophagitis: GI consulted. EGD showed severe esophagitis, black discoloration. Unable to biopsy d/t Eliquis and Plavix. Continue to hold these with plan for repeat EGD on Friday. Discussed with GI. Patient/family concerned about logistics of outpatient testing. Continue IV PPI twice daily. Pepcid and Carafate slurry. Tums PRN. Symptoms improving. --Acute COPD exacerbation: Improving. Completed steroids. Continue scheduled and as needed DuoNebs. Supplemental oxygen. --Presumed flash pulmonary edema: Treated with IV Lasix x3 doses. Clinically resolved. --Acute hypoxic and hypercapnic respiratory failure: Was on BiPAP overnight, currently weaned off. Now on 2 L of NC O2 which is baseline requirement for her. --Paroxysmal A. fib: Fairly rate controlled at this point. Continue outpatient medication as ordered. Patient is on Eliquis for anticoagulation; on hold. --DM type II: Continue subcu insulin regimen including long-acting and short acting insulin as ordered, continue SSI protocol for more adequate glycemic control.    --HTN, controlled, cont home meds as ordered     --GERD: Cont home med     --HLD: Continue current statin therapy      --Anxiety/Depression: Patient seems very anxious shakiness appears to be more related to this than the effect of steroid. Will place on as needed Ativan       DVT Prophylaxis: Hold Eliquis  Diet: ADULT DIET; Full Liquid  Code Status: Full Code  PT/OT Eval Status: As needed    Dispo - Several more days pending repeat EGD    Appropriate for A1 Discharge Unit: No      Date of Admission: 1/4/2023    Chief Complaint: Chest pain    Subjective: Limited PO intake. Ongoing pain.      Labs:   Recent Labs     01/09/23  0526 01/10/23  0557 01/11/23  0459   WBC 9.4 10.7 9.4   HGB 13.1 12.8 12.3   HCT 39.6 37.9 38.2    245 249       Recent Labs     01/09/23  0526 01/10/23  0557 01/11/23  0459    143 139   K 4.0 3.9 4.5    105 103   CO2 28 26 25   BUN 56* 49* 39*   CREATININE 1.6* 1.4* 1.4*   CALCIUM 9.8 9.5 9.4       No results for input(s): AST, ALT, BILIDIR, BILITOT, ALKPHOS in the last 72 hours. No results for input(s): INR in the last 72 hours. Physical Exam Performed:    /73   Pulse 92   Temp 98.1 °F (36.7 °C) (Oral)   Resp 20   Ht 5' 4\" (1.626 m)   Wt 148 lb 13 oz (67.5 kg)   SpO2 95%   BMI 25.54 kg/m²     General appearance:  No apparent distress, appears stated age and cooperative. Respiratory:  Normal respiratory effort. Clear to auscultation, bilaterally without Rales/Wheezes/Rhonchi. Cardiovascular:  Regular rate and rhythm with normal S1/S2 without murmurs, rubs or gallops. Abdomen:  Soft, non-tender, non-distended with normal bowel sounds. Musculoskelatal:  No edema  Neurologic:  Non-focal  Psychiatric:  Alert and oriented, normal insight  Skin:  No rashes or lesions. Capillary Refill:  Brisk, < 3 seconds. Peripheral Pulses:  +2 palpable, equal bilaterally.      Consults:    Sammie Mccoy MD

## 2023-01-11 NOTE — PROGRESS NOTES
Comprehensive Nutrition Assessment    Type and Reason for Visit:  Initial, RD Nutrition Re-Screen/LOS    Nutrition Recommendations/Plan:   Full liquid advance as tolerated per GI  Ensure and Magic cup bid, monitor tolerance  Will monitor nutritional adequacy, nutrition-related labs, weights, BMs, and clinical progress      Malnutrition Assessment:  Malnutrition Status: At risk for malnutrition (Comment) (01/11/23 1130)      Nutrition Assessment:    LOS assessment. Patient admitted with acute on chronic respiratory failure. Currently on 2 liters nasal cannula, bipap overnight. GI also following; EGD on 1/9/23 showed narrow esophagus and ulcerative colitis. Painful swallowing reported by patient consistenty this admission. Currently on a full liquid diet regimen. Po intake variable % meals. Will trial Ensure and Magic cup bid; will monitor tolerance. Nutrition Related Findings:    BG within normal limits; last BM on 1/10/23 Wound Type:  (scattered ecchymosis)       Current Nutrition Intake & Therapies:    Average Meal Intake: 26-50%, 51-75%  Average Supplements Intake: Unable to assess  ADULT DIET; Full Liquid    Anthropometric Measures:  Height: 5' 4\" (162.6 cm)  Ideal Body Weight (IBW): 120 lbs (55 kg)       Current Body Weight: 148 lb 13 oz (67.5 kg),   IBW. Weight Source: Standing Scale  Current BMI (kg/m2): 25.5                          BMI Categories: Overweight (BMI 25.0-29. 9)    Estimated Daily Nutrient Needs:  Energy Requirements Based On: Kcal/kg  Weight Used for Energy Requirements: Ideal  Energy (kcal/day): 8196-0115 (30-35 kcal/54.5 kg)  Weight Used for Protein Requirements: Ideal  Protein (g/day): 71-82 (1.3-1.5 g/54.5 kg)  Method Used for Fluid Requirements: 1 ml/kcal  Fluid (ml/day):      Nutrition Diagnosis:   Increased nutrient needs related to increase demand for energy/nutrients, impaired respiratory function as evidenced by  (increased protein needs due to extended hospital stay; variable po intake and compromised lung function)    Nutrition Interventions:   Food and/or Nutrient Delivery: Continue Current Diet, Start Oral Nutrition Supplement     Coordination of Nutrition Care: Continue to monitor while inpatient       Goals:     Goals: PO intake 75% or greater       Nutrition Monitoring and Evaluation:      Food/Nutrient Intake Outcomes: Food and Nutrient Intake  Physical Signs/Symptoms Outcomes: Nutrition Focused Physical Findings, Biochemical Data, Weight    Discharge Planning:     Too soon to determine     CHET, [address] [facility], [address] [place] [address],   Contact: [phone]

## 2023-01-11 NOTE — PROGRESS NOTES
01/11/23 0006   NIV Type   $NIV $Daily Charge   Skin Assessment Redness (see comment/note)  (mepilex on chin and nose)   Skin Protection for O2 Device Yes   NIV Started/Stopped On   Equipment Type v60   Mode Bilevel   Mask Type Full face mask   Mask Size Medium   Settings/Measurements   IPAP 12 cmH20   CPAP/EPAP 6 cmH2O   Vt (Measured) 402 mL   Rate Ordered 16   Resp 20   FiO2  30 %   Mask Leak (lpm) 10 lpm   Comfort Level Good   Using Accessory Muscles No   SpO2 98   Breath Sounds   Right Upper Lobe Diminished   Right Middle Lobe Diminished   Right Lower Lobe Diminished   Left Upper Lobe Diminished   Left Lower Lobe Diminished   Patient Observation   Observations mepilex on chin and nose   Alarm Settings   Alarms On Y   Low Pressure (cmH2O) 6 cmH2O   High Pressure (cmH2O) 30 cmH2O

## 2023-01-11 NOTE — PROGRESS NOTES
PROGRESS NOTE    HPI: Gale Pike is a(n)82 y.o. female admitted for work-up and treatment for ZENDEJAS (dyspnea on exertion) [R06.09]  COPD exacerbation ([facility] [J44.1]  Acute kidney injury ([facility] [N17.9]  Transient hypotension [I95.9]  Acute on chronic respiratory failure with hypoxia (HCC) [J96.21]  Acute on chronic respiratory failure with hypoxia and hypercapnia ([address] [J96.21, J96.22]. We are following for odynophagia. Subjective:     Continues to reports little oral intake due to painful swallowing. No other complaints. Objective:     I/O last 3 completed shifts: In: 240 [P.O.:240]  Out: -       /73   Pulse 92   Temp 98.1 °F (36.7 °C) (Oral)   Resp 20   Ht 5' 4\" (1.626 m)   Wt 148 lb 13 oz (67.5 kg)   SpO2 95%   BMI 25.54 kg/m²     Physical Exam:  HEENT: anicteric sclera, oropharyngeal membranes pink and moist.  Cor: RRR  Lungs: non-labored, no respiratory distress  Abdomen: soft, NT. No ascites. No hepatomegaly or splenomegaly  Extremities: no edema  Neuro: alert and oriented x 3, no asterixis      Results:   Lab Results   Component Value Date    ALT 7 (L) 01/05/2023    AST 13 (L) 01/05/2023    ALKPHOS 71 01/05/2023    BILIDIR <0.2 09/19/2019    PROT 8.2 01/05/2023    LABALBU 3.8 01/05/2023    INR 0.89 10/25/2021    LIPASE 54.0 01/04/2023     Lab Results   Component Value Date    WBC 9.4 01/11/2023    HGB 12.3 01/11/2023    HCT 38.2 01/11/2023    MCV 94.4 01/11/2023     01/11/2023     BUN/Cr/glu/ALT/AST/amyl/lip:  39/1.4/--/--/--/--/-- (01/11 0459)  CT CHEST WO CONTRAST    Result Date: 1/4/2023  Emphysema. There be early fibrotic changes. This is not typical for UIP. XR CHEST PORTABLE    Result Date: 1/4/2023  No acute cardiopulmonary findings         Impression:  Epigastric pain, worse with swallowing. S/p EGD 1/9 with findings of  narrowed esophagus with distal ulcerative esophagitis and black colored mucosa.   No biopsies as her AC and antiplatelet meds were noted held. 2. A-fib. On eliquis. Held since 1/8.     3. CAD s/p CABG. On Plavix. Held since 1/8.     4. Respiratory failure secondary to COPD, pulmonary edema. Down to 2 L O2, baseline. Plan:  Continue to hold eliquis and plavix. Planning for EGD with dilation on Friday with Dr. Melanie Valenzuela. Continue PPI bid, carafate slurries. Can try viscous lidocaine as needed for pain. Please do not hesitate to call with questions or concerns.       Electronically signed by: CLIFTON Saunders 1/11/2023 8:48 AM

## 2023-01-12 ENCOUNTER — ANESTHESIA EVENT (OUTPATIENT)
Dept: ENDOSCOPY | Age: 83
End: 2023-01-12
Payer: MEDICARE

## 2023-01-12 LAB
GLUCOSE BLD-MCNC: 102 MG/DL (ref 70–99)
GLUCOSE BLD-MCNC: 118 MG/DL (ref 70–99)
GLUCOSE BLD-MCNC: 90 MG/DL (ref 70–99)
GLUCOSE BLD-MCNC: 93 MG/DL (ref 70–99)
HCT VFR BLD CALC: 36.6 % (ref 36–48)
HEMOGLOBIN: 11.8 G/DL (ref 12–16)
MCH RBC QN AUTO: 30.5 PG (ref 26–34)
MCHC RBC AUTO-ENTMCNC: 32.4 G/DL (ref 31–36)
MCV RBC AUTO: 94.1 FL (ref 80–100)
PDW BLD-RTO: 14 % (ref 12.4–15.4)
PERFORMED ON: ABNORMAL
PERFORMED ON: ABNORMAL
PERFORMED ON: NORMAL
PERFORMED ON: NORMAL
PLATELET # BLD: 230 K/UL (ref 135–450)
PMV BLD AUTO: 7.9 FL (ref 5–10.5)
RBC # BLD: 3.89 M/UL (ref 4–5.2)
WBC # BLD: 8.4 K/UL (ref 4–11)

## 2023-01-12 PROCEDURE — 6370000000 HC RX 637 (ALT 250 FOR IP): Performed by: NURSE PRACTITIONER

## 2023-01-12 PROCEDURE — 6360000002 HC RX W HCPCS: Performed by: INTERNAL MEDICINE

## 2023-01-12 PROCEDURE — 6370000000 HC RX 637 (ALT 250 FOR IP): Performed by: HOSPITALIST

## 2023-01-12 PROCEDURE — 97110 THERAPEUTIC EXERCISES: CPT

## 2023-01-12 PROCEDURE — C9113 INJ PANTOPRAZOLE SODIUM, VIA: HCPCS | Performed by: INTERNAL MEDICINE

## 2023-01-12 PROCEDURE — 85027 COMPLETE CBC AUTOMATED: CPT

## 2023-01-12 PROCEDURE — 94761 N-INVAS EAR/PLS OXIMETRY MLT: CPT

## 2023-01-12 PROCEDURE — 2580000003 HC RX 258: Performed by: HOSPITALIST

## 2023-01-12 PROCEDURE — 6370000000 HC RX 637 (ALT 250 FOR IP): Performed by: INTERNAL MEDICINE

## 2023-01-12 PROCEDURE — 2700000000 HC OXYGEN THERAPY PER DAY

## 2023-01-12 PROCEDURE — 1200000000 HC SEMI PRIVATE

## 2023-01-12 PROCEDURE — 94640 AIRWAY INHALATION TREATMENT: CPT

## 2023-01-12 PROCEDURE — 36415 COLL VENOUS BLD VENIPUNCTURE: CPT

## 2023-01-12 PROCEDURE — 97116 GAIT TRAINING THERAPY: CPT

## 2023-01-12 PROCEDURE — 94669 MECHANICAL CHEST WALL OSCILL: CPT

## 2023-01-12 PROCEDURE — 6360000002 HC RX W HCPCS: Performed by: HOSPITALIST

## 2023-01-12 RX ORDER — SODIUM CHLORIDE 0.9 % (FLUSH) 0.9 %
5-40 SYRINGE (ML) INJECTION PRN
Status: CANCELLED | OUTPATIENT
Start: 2023-01-12

## 2023-01-12 RX ORDER — SODIUM CHLORIDE 9 MG/ML
INJECTION, SOLUTION INTRAVENOUS PRN
Status: CANCELLED | OUTPATIENT
Start: 2023-01-12

## 2023-01-12 RX ORDER — SODIUM CHLORIDE 0.9 % (FLUSH) 0.9 %
5-40 SYRINGE (ML) INJECTION EVERY 12 HOURS SCHEDULED
Status: CANCELLED | OUTPATIENT
Start: 2023-01-12

## 2023-01-12 RX ORDER — SODIUM CHLORIDE, SODIUM LACTATE, POTASSIUM CHLORIDE, CALCIUM CHLORIDE 600; 310; 30; 20 MG/100ML; MG/100ML; MG/100ML; MG/100ML
INJECTION, SOLUTION INTRAVENOUS CONTINUOUS
Status: CANCELLED | OUTPATIENT
Start: 2023-01-12

## 2023-01-12 RX ORDER — IPRATROPIUM BROMIDE AND ALBUTEROL SULFATE 2.5; .5 MG/3ML; MG/3ML
1 SOLUTION RESPIRATORY (INHALATION) EVERY 4 HOURS PRN
Status: CANCELLED | OUTPATIENT
Start: 2023-01-12

## 2023-01-12 RX ADMIN — ALBUTEROL SULFATE 2.5 MG: 2.5 SOLUTION RESPIRATORY (INHALATION) at 08:35

## 2023-01-12 RX ADMIN — Medication 2 PUFF: at 19:45

## 2023-01-12 RX ADMIN — FERROUS SULFATE TAB 325 MG (65 MG ELEMENTAL FE) 325 MG: 325 (65 FE) TAB at 09:53

## 2023-01-12 RX ADMIN — ALBUTEROL SULFATE 2.5 MG: 2.5 SOLUTION RESPIRATORY (INHALATION) at 16:42

## 2023-01-12 RX ADMIN — ROFLUMILAST 500 MCG: 500 TABLET ORAL at 09:54

## 2023-01-12 RX ADMIN — ACETAMINOPHEN 325MG 650 MG: 325 TABLET ORAL at 10:24

## 2023-01-12 RX ADMIN — ALBUTEROL SULFATE 2.5 MG: 2.5 SOLUTION RESPIRATORY (INHALATION) at 19:40

## 2023-01-12 RX ADMIN — BUSPIRONE HYDROCHLORIDE 5 MG: 5 TABLET ORAL at 14:09

## 2023-01-12 RX ADMIN — TIOTROPIUM BROMIDE INHALATION SPRAY 2 PUFF: 3.12 SPRAY, METERED RESPIRATORY (INHALATION) at 08:34

## 2023-01-12 RX ADMIN — SUCRALFATE 1 G: 1 TABLET ORAL at 11:54

## 2023-01-12 RX ADMIN — PANTOPRAZOLE SODIUM 40 MG: 40 INJECTION, POWDER, FOR SOLUTION INTRAVENOUS at 21:21

## 2023-01-12 RX ADMIN — LIDOCAINE HYDROCHLORIDE 15 ML: 20 SOLUTION ORAL at 11:54

## 2023-01-12 RX ADMIN — GUAIFENESIN 600 MG: 600 TABLET, EXTENDED RELEASE ORAL at 09:53

## 2023-01-12 RX ADMIN — SUCRALFATE 1 G: 1 TABLET ORAL at 17:02

## 2023-01-12 RX ADMIN — SUCRALFATE 1 G: 1 TABLET ORAL at 23:25

## 2023-01-12 RX ADMIN — LORAZEPAM 0.5 MG: 0.5 TABLET ORAL at 10:24

## 2023-01-12 RX ADMIN — ALBUTEROL SULFATE 2.5 MG: 2.5 SOLUTION RESPIRATORY (INHALATION) at 12:05

## 2023-01-12 RX ADMIN — Medication 400 MG: at 09:53

## 2023-01-12 RX ADMIN — SERTRALINE HYDROCHLORIDE 50 MG: 50 TABLET ORAL at 09:53

## 2023-01-12 RX ADMIN — BUSPIRONE HYDROCHLORIDE 5 MG: 5 TABLET ORAL at 09:53

## 2023-01-12 RX ADMIN — SODIUM CHLORIDE, PRESERVATIVE FREE 10 ML: 5 INJECTION INTRAVENOUS at 21:21

## 2023-01-12 RX ADMIN — Medication 2 PUFF: at 08:34

## 2023-01-12 RX ADMIN — PANTOPRAZOLE SODIUM 40 MG: 40 INJECTION, POWDER, FOR SOLUTION INTRAVENOUS at 09:52

## 2023-01-12 RX ADMIN — FAMOTIDINE 20 MG: 20 TABLET, FILM COATED ORAL at 09:53

## 2023-01-12 RX ADMIN — BUSPIRONE HYDROCHLORIDE 5 MG: 5 TABLET ORAL at 21:35

## 2023-01-12 RX ADMIN — SUCRALFATE 1 G: 1 TABLET ORAL at 06:43

## 2023-01-12 RX ADMIN — ATORVASTATIN CALCIUM 40 MG: 40 TABLET, FILM COATED ORAL at 21:35

## 2023-01-12 RX ADMIN — SODIUM CHLORIDE, PRESERVATIVE FREE 10 ML: 5 INJECTION INTRAVENOUS at 09:52

## 2023-01-12 RX ADMIN — ACETAMINOPHEN 325MG 650 MG: 325 TABLET ORAL at 21:42

## 2023-01-12 RX ADMIN — GUAIFENESIN 600 MG: 600 TABLET, EXTENDED RELEASE ORAL at 21:35

## 2023-01-12 ASSESSMENT — PAIN DESCRIPTION - LOCATION
LOCATION: HEAD
LOCATION: HEAD

## 2023-01-12 ASSESSMENT — PAIN SCALES - GENERAL
PAINLEVEL_OUTOF10: 1
PAINLEVEL_OUTOF10: 3

## 2023-01-12 NOTE — PLAN OF CARE
Patient's EF (Ejection Fraction) is  40%    Heart Failure Medications:  Diuretics[de-identified] None    (One of the following REQUIRED for EF </= 40%/SYSTOLIC FAILURE but MAY be used in EF% >40%/DIASTOLIC FAILURE)        ACE[de-identified] None        ARB[de-identified] None         ARNI[de-identified] None    (Beta Blockers)  NON- Evidenced Based Beta Blocker (for EF% >40%/DIASTOLIC FAILURE): Metoprolol TARTrate- Lopressor    Evidenced Based Beta Blocker::(REQUIRED for EF% <40%/SYSTOLIC FAILURE) None  . .................................................................................................................................................. Patient's weights and intake/output reviewed: Yes    Patient's Last Weight: 124 lbs obtained by standing scale. Difference of 24 lbs less than last documented weight. Intake/Output Summary (Last 24 hours) at 1/12/2023 1305  Last data filed at 1/12/2023 0959  Gross per 24 hour   Intake 420 ml   Output --   Net 420 ml       Education Booklet Provided: yes    Comorbidities Reviewed Yes    Patient has a past medical history of NADJA (acute kidney injury) ([address] Asthma, CAD (coronary artery disease), CHF (congestive heart failure) ([address] Chronic anxiety, COPD (chronic obstructive pulmonary disease) ([address] GERD (gastroesophageal reflux disease), History of blood transfusion, Hyperlipidemia, Hypertension, MRSA (methicillin resistant staph aureus) culture positive, OA (osteoarthritis), and Thyroid disease. >>For CHF and Comorbidity documentation on Education Time and Topics, please see Education Tab    Progressive Mobility Assessment:  What is this patient's Current Level of Mobility?: Ambulatory- with Assistance  How was this patient Mobilized today?: Edge of Bed, Up to Chair, and  Up to Toilet/Shower                 With Whom? Nurse and PCA                 Level of Difficulty/Assistance: 1x Assist     Pt resting in bed at this time on  2 L O2. Pt with complaints of shortness of breath.  Pt with pitting lower extremity edema.      Patient and/or Family's stated Goal of Care this Admission: reduce shortness of breath, increase activity tolerance, better understand heart failure and disease management, be more comfortable, and reduce lower extremity edema prior to discharge

## 2023-01-12 NOTE — CARE COORDINATION
1/12/23 Pt's from home with son, Angelia Urias following, has home o2 with [employer] 2.5 LNC. Likely here for several more days, getting repeat EGD.

## 2023-01-12 NOTE — PROGRESS NOTES
Hospitalist Progress Note    Assessment/Plan:    -Chest pain 2/2 GERD/Severe Esophagitis: GI consulted. EGD showed severe esophagitis, black discoloration. Unable to biopsy d/t Eliquis and Plavix. Continue to hold these with plan for repeat EGD on Friday. Discussed with GI. Patient/family concerned about logistics of outpatient testing. Continue IV PPI twice daily. Pepcid, Carafate slurry, viscous lidocaine. Tums PRN. Symptoms improving. --Acute COPD exacerbation: Improving. Completed steroids. Continue scheduled and as needed DuoNebs. Supplemental oxygen. --Acute on Chronic Systolic CHF: Noted to have pulmonary edema on presentation. Treated with IV Lasix x3 doses. Clinically resolved. ECHO showed LVEF 40% with mild global hypokinesis. This is a change from her most recent testing and would recommend outpatient follow-up with Cardiology pending resolution of GI issues. Change Lopressor to Toprol XL. Not on ACEI/ARB d/t CKD. Anticipate resuming home Lasix at DC.     --CAD: Reviewed prior Cardiology notes. 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 Most recent Cath 9/4/2019 noted EF=35-40%; CAD involving ostial left main disease and mid LAD disease. Most recent Echo 8/31/2021 EF=55-60%. Most recent Camryn-Myoview 9/1/2021 noted EF=55% negative for ischemia. As above, her repeat Echo showed LVEF 40% with global hypokinesis. Recommend outpatient follow-up with Cardiology pending resolution of GI issues. Resume Plavix after EGD/Biopsy. -CKD Stage 3: Slightly worse than baseline this admission, likely in part d/t poor PO intake prior to admission, and diuresis. Currently holding Lasix and monitoring. --Acute hypoxic and hypercapnic respiratory failure: Was on BiPAP overnight, currently weaned off. Now on 2 L of NC O2 which is baseline requirement for her. --Paroxysmal A. fib: Fairly rate controlled at this point. Continue outpatient medication as ordered.   Patient is on Eliquis for anticoagulation; on hold. --DM type II: Continue subcu insulin regimen including long-acting and short acting insulin as ordered, continue SSI protocol for more adequate glycemic control.    --HTN, controlled, cont home meds as ordered     --GERD: Cont home med     --HLD: Continue current statin therapy      --Anxiety/Depression: Patient seems very anxious shakiness appears to be more related to this than the effect of steroid. Will place on as needed Ativan       DVT Prophylaxis: Hold Eliquis  Diet: ADULT DIET; Full Liquid  Code Status: Full Code  PT/OT Eval Status: As needed    Dispo - Several more days pending repeat EGD    Appropriate for A1 Discharge Unit: No      Date of Admission: 1/4/2023    Chief Complaint: Chest pain    Subjective: Reports her chest feels well today. Meds are helping. Labs:   Recent Labs     01/10/23  0557 01/11/23  0459 01/12/23  0526   WBC 10.7 9.4 8.4   HGB 12.8 12.3 11.8*   HCT 37.9 38.2 36.6    249 230       Recent Labs     01/10/23  0557 01/11/23  0459    139   K 3.9 4.5    103   CO2 26 25   BUN 49* 39*   CREATININE 1.4* 1.4*   CALCIUM 9.5 9.4       No results for input(s): AST, ALT, BILIDIR, BILITOT, ALKPHOS in the last 72 hours. No results for input(s): INR in the last 72 hours. Physical Exam Performed:    /67   Pulse 64   Temp 98.4 °F (36.9 °C) (Oral)   Resp 18   Ht 5' 4\" (1.626 m)   Wt 148 lb 13 oz (67.5 kg)   SpO2 97%   BMI 25.54 kg/m²     General appearance:  No apparent distress, appears stated age and cooperative. Respiratory:  Normal respiratory effort. Clear to auscultation, bilaterally without Rales/Wheezes/Rhonchi. Cardiovascular:  Regular rate and rhythm with normal S1/S2 without murmurs, rubs or gallops. Abdomen:  Soft, non-tender, non-distended with normal bowel sounds. Musculoskelatal:  No edema  Neurologic:  Non-focal  Psychiatric:  Alert and oriented, normal insight  Skin:  No rashes or lesions.   Capillary Refill: Brisk, < 3 seconds. Peripheral Pulses:  +2 palpable, equal bilaterally.      Consults:    Nuria Banuelos MD

## 2023-01-12 NOTE — PROGRESS NOTES
PROGRESS NOTE    HPI: Patience Velez is a(n)82 y.o. female admitted for work-up and treatment for ZENDEJAS (dyspnea on exertion) [R06.09]  COPD exacerbation ([facility] [address] [J44.1]  Acute kidney injury ([facility] [address] [N17.9]  Transient hypotension [I95.9]  Acute on chronic respiratory failure with hypoxia (HCC) [J96.21]  Acute on chronic respiratory failure with hypoxia and hypercapnia ([address] [J96.21, J96.22]. We are following for esophagitis, odynophagia. Subjective:     Viscous lidocaine seems to be helping with her odynophagia. She was able to eat last night. Objective:     I/O last 3 completed shifts: In: 720 [P.O.:720]  Out: -       /67   Pulse 64   Temp 98.4 °F (36.9 °C) (Oral)   Resp 18   Ht 5' 4\" (1.626 m)   Wt 148 lb 13 oz (67.5 kg)   SpO2 97%   BMI 25.54 kg/m²     Physical Exam:  HEENT: anicteric sclera, oropharyngeal membranes pink and moist.  Cor: RRR  Lungs: non-labored, no respiratory distress, 2 L O2  Abdomen: soft, NT. No ascites. No hepatomegaly or splenomegaly  Extremities: no edema  Neuro: alert and oriented x 3, no asterixis      Results:   Lab Results   Component Value Date    ALT 7 (L) 01/05/2023    AST 13 (L) 01/05/2023    ALKPHOS 71 01/05/2023    BILIDIR <0.2 09/19/2019    PROT 8.2 01/05/2023    LABALBU 3.8 01/05/2023    INR 0.89 10/25/2021    LIPASE 54.0 01/04/2023     Lab Results   Component Value Date    WBC 8.4 01/12/2023    HGB 11.8 (L) 01/12/2023    HCT 36.6 01/12/2023    MCV 94.1 01/12/2023     01/12/2023     BUN/Cr/glu/ALT/AST/amyl/lip:  39/1.4/--/--/--/--/-- (01/11 0459)  No results found. Impression:  Odynophagia, dysphagia. S/p EGD 1/9 with findings of  narrowed esophagus with distal ulcerative esophagitis and black colored mucosa. No biopsies as her AC and antiplatelet meds were noted held. 2. A-fib. On eliquis. Held since 1/8.     3. CAD s/p CABG. On Plavix.  Held since 1/8.     4. Respiratory failure secondary to COPD, pulmonary edema. Down to 2 L O2, baseline. Plan:  EGD with dilation tomorrow. Continue to hold eliquis and plavix. PPI bid, carafate slurries, viscous lidocaine as needed. Please do not hesitate to call with questions or concerns.       Electronically signed by: CLIFTON Almeida 1/12/2023 9:12 AM

## 2023-01-12 NOTE — PROGRESS NOTES
/66   Pulse 64   Temp 98.4 °F (36.9 °C) (Oral)   Resp 18   Ht 5' 4\" (1.626 m)   Wt 124 lb 8 oz (56.5 kg)   SpO2 97%   BMI 21.37 kg/m²  op 2L O2 per nasal cannula. Pt with complaints of headache \"3\", will give prn tylenol per pt request.  Pt anxious this a.m, will give prn ativan per pt request as well. Pt with intermittent dyspnea at rest, dyspnea with exertion. Lungs diminished. Pt with congested, productive cough. NSR on tele. Pt denies any other needs at this time. Bedside table, call light, fluids within reach. Bed alarm activated on bed. Pt instructed to call out for any needs and assistance. Will continue to monitor.   Hunter Roca RN  1/12/2023

## 2023-01-12 NOTE — PROGRESS NOTES
01/11/23 2116   NIV Type   Skin Assessment Redness (see comment/note)  (Chin redness.  Changed to nasal mask to keep pressure off chin)   Equipment Type V60   Mode Bilevel   Mask Type Nasal mask   Mask Size Medium   Settings/Measurements   PIP Observed 12 cm H20   IPAP 12 cmH20   CPAP/EPAP 6 cmH2O   Vt (Measured) 471 mL   Rate Ordered 16   Resp 21   FiO2  30 %   I Time/ I Time % 0.9 s   Minute Volume (L/min) 9.9 Liters   Mask Leak (lpm) 0 lpm   Comfort Level Good   Using Accessory Muscles No   SpO2 96

## 2023-01-12 NOTE — PROGRESS NOTES
Physical Therapy  Facility/Department: [facility] C4 PCU  Daily Treatment Note  NAME: Luciano Bennett  : 1940  MRN: [id number]    Date of Service: 2023    Discharge Recommendations:  24 hour supervision or assist, Home with Home health PT   PT Equipment Recommendations  Equipment Needed: No    Patient Diagnosis(es): The primary encounter diagnosis was COPD exacerbation ([facility] [address] Diagnoses of Acute on chronic respiratory failure with hypoxia (HCC), ZENDEJAS (dyspnea on exertion), Acute kidney injury ([facility] [address] and Transient hypotension were also pertinent to this visit. Assessment   Assessment: Pt participated well with therapy and progressed to performing 15 reps of BLE therex as well as ambulation distance in room with no AD. Pt remains limited by fatigue and SOB, but vitals remain stable throughout session. Pt's BP initially low in sidelying upon entry, but returned to normal with seated therex. Pt would continue to benefit from therapy to improve functional mobility and endurance. Continue to rec home with 24/ A and HHPT upon d/c. Activity Tolerance: Other (comment)  Equipment Needed: No     Plan    Physcial Therapy Plan  General Plan: 3-5 times per week  Specific Instructions for Next Treatment: Progress ther ex and mobility as tolerated  Current Treatment Recommendations: Strengthening;Balance training;Functional mobility training;Gait training;Home exercise program;Safety education & training;Patient/Caregiver education & training;Transfer training; Endurance training; Therapeutic activities     Restrictions  Restrictions/Precautions  Restrictions/Precautions: Fall Risk, General Precautions, Contact Precautions (MDRO)  Position Activity Restriction  Other position/activity restrictions: telemetry, 2 L O 2;     Subjective    Subjective  Subjective: pt found in bed, agreeable to therapy  Pain: denies pain at this time  Orientation  Overall Orientation Status: Within Functional Limits  Orientation Level: Oriented X4  Cognition  Overall Cognitive Status: Exceptions  Arousal/Alertness: Delayed responses to stimuli (d/t [place])  Following Commands: Follows one step commands with increased time  Attention Span: Attends with cues to redirect  Memory: Appears intact  Safety Judgement: Decreased awareness of need for assistance (incontinent of urine/purewick not working)  Insights: Decreased awareness of deficits  Initiation: Requires cues for some  Sequencing: Does not require cues     Objective   Vitals  Heart Rate: 75  Heart Rate Source: Monitor  BP: 93/62  BP Location: Right upper arm  BP Method: Automatic  Patient Position: Lying left side  MAP (Calculated): 72  SpO2: 96 %  O2 Device: Nasal cannula  Comment: 2L O2  Bed Mobility Training  Bed Mobility Training: Yes  Interventions: Verbal cues; Safety awareness training  Supine to Sit: Modified independent; Additional time (with bed rails)  Sit to Supine: Modified independent; Additional time (with bed rails)  Scooting: Modified independent; Additional time  Balance  Sitting: Intact  Standing: Impaired  Standing - Static: Fair;Good  Standing - Dynamic: Fair;Occasional (without AD)  Transfer Training  Transfer Training: Yes  Interventions: Safety awareness training;Verbal cues; Visual cues  Sit to Stand: Stand-by assistance; Additional time  Stand to Sit: Stand-by assistance  Toilet Transfer: Stand-by assistance; Adaptive equipment (with grab bars)  Gait Training  Gait Training: Yes  Gait  Overall Level of Assistance: Contact-guard assistance;Stand-by assistance (CGA/close SBA x 1)  Interventions: Safety awareness training;Verbal cues; Visual cues; Tactile cues  Base of Support: Widened  Speed/Harika: Pace decreased (< 100 feet/min)  Step Length: Left shortened;Right shortened  Gait Abnormalities: Decreased step clearance;Trunk sway increased; Shuffling gait  Distance (ft): 40 Feet  Assistive Device: Gait belt; Other (comment) (no AD, pt declines use of RW)     PT Exercises  Exercise Treatment: BLE ther ex: AP, LAQ, seated marches x15 each     Safety Devices  Type of Devices: Call light within reach;Nurse notified; Left in bed;Bed alarm in place;Gait belt;Patient at risk for falls  Restraints  Restraints Initially in Place: No     [facility] 6 Clicks Inpatient Mobility:  AM-PAC Mobility Inpatient   How much difficulty turning over in bed?: None  How much difficulty sitting down on / standing up from a chair with arms?: A Little  How much difficulty moving from lying on back to sitting on side of bed?: None  How much help from another person moving to and from a bed to a chair?: A Little  How much help from another person needed to walk in hospital room?: A Little  How much help from another person for climbing 3-5 steps with a railing?: A Little  AM-[facility] Inpatient Mobility Raw Score : 20  AM-PAC Inpatient T-Scale Score : 47.67  Mobility Inpatient CMS 0-100% Score: 35.83  Mobility Inpatient CMS G-Code Modifier : CJ    Goals  Short Term Goals  Time Frame for Short Term Goals: 7 days (1/12/23) - goals extended to 1/16/23 due to extended LOS  Short Term Goal 1: Pt will perform sit<>stand and bed<>chair transfers with modified(I)  Short Term Goal 2: Pt will ambulate x 50 feet without device with supervision  Short Term Goal 3: Pt will meet 3/5 PT-related CHF goals - MET 1/05/23  Short Term Goal 4: By 1/8/23, pt will tolerate 15 reps of LE ther ex for improved strength and activity tolerance - goal met 1/12  Patient Goals   Patient Goals : \"To go back home\"    Education  Patient Education  Education Given To: Patient  Education Provided: Role of Therapy;Plan of Care;Family Education; Energy Conservation;Precautions;Transfer Training  Education Provided Comments: safety with mobility and use of call light; importance of mobility and upright position  Education Method: Demonstration;Verbal  Barriers to Learning: Vision  Education Outcome: Verbalized understanding;Demonstrated understanding;Continued education needed    Therapy Time   Individual Concurrent Group Co-treatment   Time In 1325         Time Out 1349         Minutes 24         Timed Code Treatment Minutes: 24 Minutes     If pt is unable to be seen after this session, please let this note serve as discharge summary. Please see case management note for discharge disposition. Thank you.     Donnell Silverio, PT, DPT

## 2023-01-13 ENCOUNTER — ANESTHESIA (OUTPATIENT)
Dept: ENDOSCOPY | Age: 83
End: 2023-01-13
Payer: MEDICARE

## 2023-01-13 LAB
GLUCOSE BLD-MCNC: 88 MG/DL (ref 70–99)
GLUCOSE BLD-MCNC: 90 MG/DL (ref 70–99)
GLUCOSE BLD-MCNC: 90 MG/DL (ref 70–99)
GLUCOSE BLD-MCNC: 93 MG/DL (ref 70–99)
HCT VFR BLD CALC: 36.8 % (ref 36–48)
HEMOGLOBIN: 12 G/DL (ref 12–16)
MCH RBC QN AUTO: 30.9 PG (ref 26–34)
MCHC RBC AUTO-ENTMCNC: 32.7 G/DL (ref 31–36)
MCV RBC AUTO: 94.7 FL (ref 80–100)
PDW BLD-RTO: 13.9 % (ref 12.4–15.4)
PERFORMED ON: NORMAL
PLATELET # BLD: 225 K/UL (ref 135–450)
PMV BLD AUTO: 8 FL (ref 5–10.5)
RBC # BLD: 3.89 M/UL (ref 4–5.2)
WBC # BLD: 7.4 K/UL (ref 4–11)

## 2023-01-13 PROCEDURE — 7100000011 HC PHASE II RECOVERY - ADDTL 15 MIN: Performed by: INTERNAL MEDICINE

## 2023-01-13 PROCEDURE — 88342 IMHCHEM/IMCYTCHM 1ST ANTB: CPT

## 2023-01-13 PROCEDURE — 2700000000 HC OXYGEN THERAPY PER DAY

## 2023-01-13 PROCEDURE — 6370000000 HC RX 637 (ALT 250 FOR IP): Performed by: INTERNAL MEDICINE

## 2023-01-13 PROCEDURE — 2709999900 HC NON-CHARGEABLE SUPPLY: Performed by: INTERNAL MEDICINE

## 2023-01-13 PROCEDURE — 6360000002 HC RX W HCPCS: Performed by: INTERNAL MEDICINE

## 2023-01-13 PROCEDURE — 94669 MECHANICAL CHEST WALL OSCILL: CPT

## 2023-01-13 PROCEDURE — 3609017700 HC EGD DILATION GASTRIC/DUODENAL STRICTURE: Performed by: INTERNAL MEDICINE

## 2023-01-13 PROCEDURE — 97530 THERAPEUTIC ACTIVITIES: CPT

## 2023-01-13 PROCEDURE — 94761 N-INVAS EAR/PLS OXIMETRY MLT: CPT

## 2023-01-13 PROCEDURE — 3700000000 HC ANESTHESIA ATTENDED CARE: Performed by: INTERNAL MEDICINE

## 2023-01-13 PROCEDURE — 2580000003 HC RX 258: Performed by: ANESTHESIOLOGY

## 2023-01-13 PROCEDURE — 7100000010 HC PHASE II RECOVERY - FIRST 15 MIN: Performed by: INTERNAL MEDICINE

## 2023-01-13 PROCEDURE — 36415 COLL VENOUS BLD VENIPUNCTURE: CPT

## 2023-01-13 PROCEDURE — 6370000000 HC RX 637 (ALT 250 FOR IP): Performed by: NURSE PRACTITIONER

## 2023-01-13 PROCEDURE — 2580000003 HC RX 258: Performed by: INTERNAL MEDICINE

## 2023-01-13 PROCEDURE — 88305 TISSUE EXAM BY PATHOLOGIST: CPT

## 2023-01-13 PROCEDURE — 0DB28ZX EXCISION OF MIDDLE ESOPHAGUS, VIA NATURAL OR ARTIFICIAL OPENING ENDOSCOPIC, DIAGNOSTIC: ICD-10-PCS | Performed by: INTERNAL MEDICINE

## 2023-01-13 PROCEDURE — 1200000000 HC SEMI PRIVATE

## 2023-01-13 PROCEDURE — 85027 COMPLETE CBC AUTOMATED: CPT

## 2023-01-13 PROCEDURE — 3609012400 HC EGD TRANSORAL BIOPSY SINGLE/MULTIPLE: Performed by: INTERNAL MEDICINE

## 2023-01-13 PROCEDURE — 6360000002 HC RX W HCPCS: Performed by: NURSE ANESTHETIST, CERTIFIED REGISTERED

## 2023-01-13 PROCEDURE — C9113 INJ PANTOPRAZOLE SODIUM, VIA: HCPCS | Performed by: INTERNAL MEDICINE

## 2023-01-13 PROCEDURE — 94640 AIRWAY INHALATION TREATMENT: CPT

## 2023-01-13 PROCEDURE — 88341 IMHCHEM/IMCYTCHM EA ADD ANTB: CPT

## 2023-01-13 PROCEDURE — 97535 SELF CARE MNGMENT TRAINING: CPT

## 2023-01-13 RX ORDER — SODIUM CHLORIDE, SODIUM LACTATE, POTASSIUM CHLORIDE, CALCIUM CHLORIDE 600; 310; 30; 20 MG/100ML; MG/100ML; MG/100ML; MG/100ML
INJECTION, SOLUTION INTRAVENOUS CONTINUOUS
Status: DISCONTINUED | OUTPATIENT
Start: 2023-01-13 | End: 2023-01-13 | Stop reason: HOSPADM

## 2023-01-13 RX ORDER — SODIUM CHLORIDE 0.9 % (FLUSH) 0.9 %
5-40 SYRINGE (ML) INJECTION EVERY 12 HOURS SCHEDULED
Status: DISCONTINUED | OUTPATIENT
Start: 2023-01-13 | End: 2023-01-13 | Stop reason: HOSPADM

## 2023-01-13 RX ORDER — PROPOFOL 10 MG/ML
INJECTION, EMULSION INTRAVENOUS PRN
Status: DISCONTINUED | OUTPATIENT
Start: 2023-01-13 | End: 2023-01-13 | Stop reason: SDUPTHER

## 2023-01-13 RX ORDER — SODIUM CHLORIDE 0.9 % (FLUSH) 0.9 %
5-40 SYRINGE (ML) INJECTION PRN
Status: DISCONTINUED | OUTPATIENT
Start: 2023-01-13 | End: 2023-01-13 | Stop reason: HOSPADM

## 2023-01-13 RX ORDER — SODIUM CHLORIDE 9 MG/ML
INJECTION, SOLUTION INTRAVENOUS PRN
Status: DISCONTINUED | OUTPATIENT
Start: 2023-01-13 | End: 2023-01-13 | Stop reason: HOSPADM

## 2023-01-13 RX ADMIN — SUCRALFATE 1 G: 1 TABLET ORAL at 17:52

## 2023-01-13 RX ADMIN — ALBUTEROL SULFATE 2.5 MG: 2.5 SOLUTION RESPIRATORY (INHALATION) at 12:21

## 2023-01-13 RX ADMIN — SUCRALFATE 1 G: 1 TABLET ORAL at 13:04

## 2023-01-13 RX ADMIN — PANTOPRAZOLE SODIUM 40 MG: 40 INJECTION, POWDER, FOR SOLUTION INTRAVENOUS at 10:57

## 2023-01-13 RX ADMIN — Medication 2 PUFF: at 20:14

## 2023-01-13 RX ADMIN — PROPOFOL 20 MG: 10 INJECTION, EMULSION INTRAVENOUS at 07:35

## 2023-01-13 RX ADMIN — FERROUS SULFATE TAB 325 MG (65 MG ELEMENTAL FE) 325 MG: 325 (65 FE) TAB at 10:56

## 2023-01-13 RX ADMIN — SODIUM CHLORIDE, POTASSIUM CHLORIDE, SODIUM LACTATE AND CALCIUM CHLORIDE: 600; 310; 30; 20 INJECTION, SOLUTION INTRAVENOUS at 07:14

## 2023-01-13 RX ADMIN — METOPROLOL TARTRATE 25 MG: 25 TABLET, FILM COATED ORAL at 22:28

## 2023-01-13 RX ADMIN — SODIUM CHLORIDE, PRESERVATIVE FREE 10 ML: 5 INJECTION INTRAVENOUS at 22:25

## 2023-01-13 RX ADMIN — BUSPIRONE HYDROCHLORIDE 5 MG: 5 TABLET ORAL at 22:28

## 2023-01-13 RX ADMIN — ROFLUMILAST 500 MCG: 500 TABLET ORAL at 13:04

## 2023-01-13 RX ADMIN — Medication 10 ML: at 14:43

## 2023-01-13 RX ADMIN — ATORVASTATIN CALCIUM 40 MG: 40 TABLET, FILM COATED ORAL at 22:28

## 2023-01-13 RX ADMIN — LIDOCAINE HYDROCHLORIDE 15 ML: 20 SOLUTION ORAL at 15:00

## 2023-01-13 RX ADMIN — PANTOPRAZOLE SODIUM 40 MG: 40 INJECTION, POWDER, FOR SOLUTION INTRAVENOUS at 22:27

## 2023-01-13 RX ADMIN — ALBUTEROL SULFATE 2.5 MG: 2.5 SOLUTION RESPIRATORY (INHALATION) at 15:46

## 2023-01-13 RX ADMIN — FAMOTIDINE 20 MG: 20 TABLET, FILM COATED ORAL at 10:51

## 2023-01-13 RX ADMIN — SERTRALINE HYDROCHLORIDE 50 MG: 50 TABLET ORAL at 10:56

## 2023-01-13 RX ADMIN — Medication 10 ML: at 10:57

## 2023-01-13 RX ADMIN — Medication 400 MG: at 10:56

## 2023-01-13 RX ADMIN — METOPROLOL TARTRATE 25 MG: 25 TABLET, FILM COATED ORAL at 10:50

## 2023-01-13 RX ADMIN — ALBUTEROL SULFATE 2.5 MG: 2.5 SOLUTION RESPIRATORY (INHALATION) at 20:12

## 2023-01-13 RX ADMIN — GUAIFENESIN 600 MG: 600 TABLET, EXTENDED RELEASE ORAL at 10:57

## 2023-01-13 RX ADMIN — ONDANSETRON 4 MG: 2 INJECTION INTRAMUSCULAR; INTRAVENOUS at 14:42

## 2023-01-13 RX ADMIN — BUSPIRONE HYDROCHLORIDE 5 MG: 5 TABLET ORAL at 10:56

## 2023-01-13 RX ADMIN — SODIUM CHLORIDE, PRESERVATIVE FREE 10 ML: 5 INJECTION INTRAVENOUS at 11:22

## 2023-01-13 RX ADMIN — GUAIFENESIN 600 MG: 600 TABLET, EXTENDED RELEASE ORAL at 22:28

## 2023-01-13 RX ADMIN — PROPOFOL 40 MG: 10 INJECTION, EMULSION INTRAVENOUS at 07:31

## 2023-01-13 RX ADMIN — PROPOFOL 20 MG: 10 INJECTION, EMULSION INTRAVENOUS at 07:33

## 2023-01-13 ASSESSMENT — LIFESTYLE VARIABLES: SMOKING_STATUS: 1

## 2023-01-13 ASSESSMENT — ENCOUNTER SYMPTOMS: SHORTNESS OF BREATH: 1

## 2023-01-13 ASSESSMENT — PAIN DESCRIPTION - LOCATION
LOCATION: THROAT

## 2023-01-13 ASSESSMENT — PAIN DESCRIPTION - DESCRIPTORS
DESCRIPTORS: SORE

## 2023-01-13 ASSESSMENT — PAIN SCALES - GENERAL
PAINLEVEL_OUTOF10: 0
PAINLEVEL_OUTOF10: 10
PAINLEVEL_OUTOF10: 10
PAINLEVEL_OUTOF10: 5

## 2023-01-13 ASSESSMENT — COPD QUESTIONNAIRES: CAT_SEVERITY: SEVERE

## 2023-01-13 NOTE — PLAN OF CARE
Patient's EF (Ejection Fraction) is greater than 40%    Heart Failure Medications:  Diuretics[de-identified]   (One of the following REQUIRED for EF </= 40%/SYSTOLIC FAILURE but MAY be used in EF% >40%/DIASTOLIC FAILURE)        ACE[de-identified] None        ARB[de-identified] None         ARNI[de-identified] None    (Beta Blockers)  NON- Evidenced Based Beta Blocker (for EF% >40%/DIASTOLIC FAILURE): None    Evidenced Based Beta Blocker::(REQUIRED for EF% <40%/SYSTOLIC FAILURE)   . .................................................................................................................................................. Patient's weights and intake/output reviewed: Yes    Patient's Last Weight: 125 lbs obtained by standing scale. Difference of 0.2 oz more than last documented weight. Intake/Output Summary (Last 24 hours) at 1/13/2023 0715  Last data filed at 1/12/2023 1758  Gross per 24 hour   Intake 1080 ml   Output 100 ml   Net 980 ml       Education Booklet Provided: yes    Comorbidities Reviewed Yes    Patient has a past medical history of NADJA (acute kidney injury) ([address] Asthma, CAD (coronary artery disease), CHF (congestive heart failure) ([address] Chronic anxiety, COPD (chronic obstructive pulmonary disease) ([address] GERD (gastroesophageal reflux disease), History of blood transfusion, Hyperlipidemia, Hypertension, MRSA (methicillin resistant staph aureus) culture positive, OA (osteoarthritis), and Thyroid disease. >>For CHF and Comorbidity documentation on Education Time and Topics, please see Education Tab    Progressive Mobility Assessment:  What is this patient's Current Level of Mobility?: Ambulatory- with Assistance  How was this patient Mobilized today?:  Up to Toilet/Shower, ambulated                   With Whom? Nurse and PCA                 Level of Difficulty/Assistance: 1x Assist     Pt resting in bed at this time on  2 L O2. Pt denies shortness of breath. Pt without lower extremity edema.      Patient and/or Family's stated Goal of Care this Admission: reduce shortness of breath, increase activity tolerance, better understand heart failure and disease management, be more comfortable, and reduce lower extremity edema prior to discharge        :

## 2023-01-13 NOTE — H&P
Pre-sedation Assessment    History and Physical / Pre-Sedation Assessment  Patient:  Zenia Claude   :   1940     Intended Procedure: EGD/dilation      HPI: Odynophagia, dysphagia. S/p EGD  with findings of  narrowed esophagus with distal ulcerative esophagitis and black colored mucosa. No biopsies/dilation as was on [facility] and antiplatelet meds were noted held. I recommended to both family and hospitalist to hold even longer before dilation but they both insisted on having it done no later than today even w the slightly increased risk of bleeding due to risk of holding blood thinners any longer     2. A-fib. On eliquis. Held since .     3. CAD s/p CABG. On Plavix. Held since .       Current Facility-Administered Medications   Medication Dose Route Frequency Provider Last Rate Last Admin    lactated ringers infusion   IntraVENous Continuous Hill Bhakta  mL/hr at 23 0719 NoRateChange at 23 0719    sodium chloride flush 0.9 % injection 5-40 mL  5-40 mL IntraVENous 2 times per day Hill Bhakta MD        sodium chloride flush 0.9 % injection 5-40 mL  5-40 mL IntraVENous PRN Hill Bhakta MD        0.9 % sodium chloride infusion   IntraVENous PRN Hill Bhakta MD        lidocaine viscous hcl (XYLOCAINE) 2 % solution 15 mL  15 mL Mouth/Throat PRN CLIFTON Sanderson - CNP   15 mL at 23 1154    famotidine (PEPCID) tablet 20 mg  20 mg Oral Daily Radha Lee MD   20 mg at 23 0953    sucralfate (CARAFATE) tablet 1 g  1 g Oral 4 times per day Radha Lee MD   1 g at 23 2325    calcium carbonate (TUMS) chewable tablet 500 mg  500 mg Oral Q6H PRN Radha Lee MD   500 mg at 23 2203    pantoprazole (PROTONIX) injection 40 mg  40 mg IntraVENous BID Radha Lee MD   40 mg at 23 2121    LORazepam (ATIVAN) tablet 0.5 mg  0.5 mg Oral Q6H PRN Radha Lee MD   0.5 mg at 23 1024    [Held by provider] apixaban Joan Nita) tablet 2.5 mg  2.5 mg Oral BID Sadi Nicole MD   2.5 mg at 01/08/23 2020    busPIRone (BUSPAR) tablet 5 mg  5 mg Oral TID Parris Baca MD   5 mg at 01/12/23 2135    atorvastatin (LIPITOR) tablet 40 mg  40 mg Oral Nightly Parris Baca MD   40 mg at 01/12/23 2135    [Held by provider] clopidogrel (PLAVIX) tablet 75 mg  75 mg Oral Daily Sadi Nicole MD   75 mg at 01/08/23 9420    docusate sodium (COLACE) capsule 100 mg  100 mg Oral BID PRN Parris Baca MD        ferrous sulfate (IRON 325) tablet 325 mg  325 mg Oral Daily with breakfast Parris Baca MD   325 mg at 01/12/23 0953    fluticasone (FLONASE) 50 MCG/ACT nasal spray 1 spray  1 spray Each Nostril Daily PRN Parris Baca MD        guaiFENesin [facility]) extended release tablet 600 mg  600 mg Oral BID Parris Baca MD   600 mg at 01/12/23 2135    magnesium oxide (MAG-OX) tablet 400 mg  400 mg Oral Daily Parris Baca MD   400 mg at 01/12/23 0953    Roflumilast (DALIRESP) tablet 500 mcg  500 mcg Oral Daily Parris Baca MD   500 mcg at 01/12/23 0954    sertraline (ZOLOFT) tablet 50 mg  50 mg Oral Daily Parris Baca MD   50 mg at 01/12/23 0953    metoprolol tartrate (LOPRESSOR) tablet 25 mg  25 mg Oral BID Parris Baca MD   25 mg at 01/11/23 2203    sodium chloride flush 0.9 % injection 10 mL  10 mL IntraVENous 2 times per day Parris Baca MD   10 mL at 01/12/23 2121    sodium chloride flush 0.9 % injection 10 mL  10 mL IntraVENous PRN Parris Baca MD        0.9 % sodium chloride infusion   IntraVENous PRN Parris Baca  mL/hr at 01/09/23 1231 100 mL/hr at 01/09/23 1231    senna (SENOKOT) tablet 8.6 mg  1 tablet Oral Daily PRN Parris Baca MD        polyethylene glycol (GLYCOLAX) packet 17 g  17 g Oral Daily PRN Parris Baca MD        acetaminophen (TYLENOL) tablet 650 mg  650 mg Oral Q6H PRN Parris Baca MD   650 mg at 01/12/23 2142    Or    acetaminophen (TYLENOL) suppository 650 mg  650 mg Rectal Q6H PRN Thompson Eaton MD        ondansetron (ZOFRAN-ODT) disintegrating tablet 4 mg  4 mg Oral Q8H PRN Thompson Eaton MD        Or    ondansetron [facility]) injection 4 mg  4 mg IntraVENous Q6H PRN Thompson Eaton MD   4 mg at 01/09/23 0521    mometasone-formoterol (DULERA) 200-5 MCG/ACT inhaler 2 puff  2 puff Inhalation BID Thompson Eaton MD   2 puff at 01/12/23 1945    tiotropium (SPIRIVA RESPIMAT) 2.5 MCG/ACT inhaler 2 puff  2 puff Inhalation Daily Thompson Eaton MD   2 puff at 01/12/23 0834    albuterol (PROVENTIL) nebulizer solution 2.5 mg  2.5 mg Nebulization Q4H WA Thompson Eaton MD   2.5 mg at 01/12/23 1940    perflutren lipid microspheres (DEFINITY) injection 1.5 mL  1.5 mL IntraVENous ONCE PRN Thompson Eaton MD        glucose chewable tablet 16 g  4 tablet Oral PRN Thompson Eaton MD        dextrose bolus 10% 125 mL  125 mL IntraVENous PRN Thompson Eaton MD        Or    dextrose bolus 10% 250 mL  250 mL IntraVENous PRN Thompson Eaton MD        glucagon (rDNA) injection 1 mg  1 mg SubCUTAneous PRN Thompson Eaton MD        dextrose 10 % infusion   IntraVENous Continuous PRN Thompson Eaton MD        insulin lispro (HUMALOG) injection vial 0-8 Units  0-8 Units SubCUTAneous TID WC Thompson Eaton MD        insulin lispro (HUMALOG) injection vial 0-4 Units  0-4 Units SubCUTAneous Nightly Thompson Eaton MD         Past Medical History:   Diagnosis Date    NADJA (acute kidney injury) ([facility] [address]     Asthma     CAD (coronary artery disease)     CHF (congestive heart failure) ([facility] [address]     unsure    Chronic anxiety     COPD (chronic obstructive pulmonary disease) (HCC)     GERD (gastroesophageal reflux disease) 9/9/2021    History of blood transfusion     Hyperlipidemia     Hypertension     MRSA (methicillin resistant staph aureus) culture positive 09/22/2019    + resp cx    OA (osteoarthritis) 8/12/2014    Thyroid disease      Past Surgical History: Procedure Laterality Date    BRONCHOSCOPY  09/08/2019    Dr. Puneet Lux - w/BAL    CARDIAC CATHETERIZATION  09/05/2019    Dr. Luca Zaidi 2/24/2020    COLONOSCOPY DIAGNOSTIC performed by Malena Gaitan DO at [facility] N/A 10/22/2021    COLONOSCOPY POLYPECTOMY SNARE/COLD BIOPSY performed by Danielle Martínez MD at [facility] GRAFT N/A 9/19/2019    OFF PUMP CORONARY ARTERY BYPASS GRAFTING X2, INTERNAL MAMMARY ARTERY, SAPHENOUS VEIN GRAFT performed by Karo Mcdaniel MD at [address] CATH  9/20/2021    IR MIDLINE CATH 9/20/2021 [address] SPECIAL PROCEDURES    MASTECTOMY, PARTIAL Left     SIGMOIDOSCOPY  02/27/2020    4 bands    SIGMOIDOSCOPY N/A 2/27/2020    FLEX SIG W/ BANDING SIGMOIDOSCOPY DIAGNOSTIC FLEXIBLE performed by Malena Gaitan DO at [facility] 1/9/2023    EGD DIAGNOSTIC ONLY performed by Rebel Prasad MD at [place] [address] notes reviewed and agreed. Medications reviewed  Allergies: Allergies   Allergen Reactions    Latex Other (See Comments)     Burning      Codeine Other (See Comments)     \"hallucinations\"           Physical Exam:  Vital Signs: /62   Pulse 94   Temp 97.8 °F (36.6 °C)   Resp 18   Ht 5' 4\" (1.626 m)   Wt 125 lb (56.7 kg)   SpO2 98%   BMI 21.46 kg/m²  Body mass index is 21.46 kg/m². Airway:Normal  Cardiac:Normal  Pulmonary:Normal  Abdomen:Normal  Specific to procedure: none      Pre-Procedure Assessment/Plan:  ASA 3 - Patient with moderate systemic disease with functional limitations  Mallampati II  Level of Sedation Plan:Deep sedation    Post Procedure plan: Return to same level of care    I assessed the patient and find that the patient is in satisfactory condition to proceed with the planned procedure and sedation plan.     I have explained the risk, benefits, and alternatives to the procedure. The patient understands and agrees to proceed.   Yes    Skye Joyce MD       O) [phone]        Skye Joyce MD  7:21 AM 1/13/2023

## 2023-01-13 NOTE — PROGRESS NOTES
Hospitalist Progress Note    Assessment/Plan:    -Chest pain 2/2 GERD/Severe Esophagitis: GI consulted. EGD showed severe esophagitis, black discoloration. Unable to biopsy d/t Eliquis and Plavix. These were on hold pending repeat EGD/biopsy 1/13. EGD showed some improvement in ulcerative esophagitis; biopsy obtained and dilation performed. Continue PPI twice daily. Pepcid, Carafate slurry, viscous lidocaine. Tums PRN. Symptoms improving. --Acute COPD exacerbation: Improving. Completed steroids. Continue scheduled and as needed DuoNebs. Supplemental oxygen. --Acute on Chronic Systolic CHF: Noted to have pulmonary edema on presentation. Treated with IV Lasix x3 doses. Clinically resolved. ECHO showed LVEF 40% with mild global hypokinesis. This is a change from her most recent testing and would recommend outpatient follow-up with Cardiology pending resolution of GI issues. Changed Lopressor to Toprol XL. Not on ACEI/ARB d/t CKD. Anticipate resuming home Lasix at DC.     --CAD: Reviewed prior Cardiology notes. 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 Most recent Cath 9/4/2019 noted EF=35-40%; CAD involving ostial left main disease and mid LAD disease. Most recent Echo 8/31/2021 EF=55-60%. Most recent Camryn-Myoview 9/1/2021 noted EF=55% negative for ischemia. As above, her repeat Echo showed LVEF 40% with global hypokinesis. Recommend outpatient follow-up with Cardiology pending resolution of GI issues. Resume Plavix after EGD/Biopsy when ok with GI.     -CKD Stage 3: Slightly worse than baseline this admission, likely in part d/t poor PO intake prior to admission, and diuresis. Currently holding Lasix and monitoring. --Acute hypoxic and hypercapnic respiratory failure: Was on BiPAP overnight, currently weaned off. Now on 2 L of NC O2 which is baseline requirement for her. --Paroxysmal A. fib: Fairly rate controlled at this point. Continue outpatient medication as ordered.   Patient is on Eliquis for anticoagulation; on hold. --DM type II: Continue subcu insulin regimen including long-acting and short acting insulin as ordered, continue SSI protocol for more adequate glycemic control.    --HTN, controlled, cont home meds as ordered     --GERD: Cont home med     --HLD: Continue current statin therapy      --Anxiety/Depression: Patient seems very anxious shakiness appears to be more related to this than the effect of steroid. Will place on as needed Ativan     - Diarrhea: Etiology unclear. Developed 1/13 after EGD. ?related to recent liquid diet. Not severe and not c/w C.diff at this time, although she does have a history of it. Monitor. DVT Prophylaxis: Hold Eliquis  Diet: ADULT DIET; Regular; 5 carb choices (75 gm/meal); Low Fat/Low Chol/High Fiber/CHRISTIANNE  Code Status: Full Code  PT/OT Eval Status: As needed    Dispo - Likely home 1/14 if diarrhea improved and otherwise stable    Date of Admission: 1/4/2023    Chief Complaint: Chest pain    Subjective: Reports her chest pain continues to be well controlled now. Developed some diarrhea after the EGD. Labs:   Recent Labs     01/11/23  0459 01/12/23  0526 01/13/23  0518   WBC 9.4 8.4 7.4   HGB 12.3 11.8* 12.0   HCT 38.2 36.6 36.8    230 225       Recent Labs     01/11/23  0459      K 4.5      CO2 25   BUN 39*   CREATININE 1.4*   CALCIUM 9.4       No results for input(s): AST, ALT, BILIDIR, BILITOT, ALKPHOS in the last 72 hours. No results for input(s): INR in the last 72 hours. Physical Exam Performed:    BP (!) 146/88   Pulse 96   Temp 97.4 °F (36.3 °C)   Resp 18   Ht 5' 4\" (1.626 m)   Wt 125 lb (56.7 kg)   SpO2 99%   BMI 21.46 kg/m²     General appearance:  No apparent distress, appears stated age and cooperative. Respiratory:  Normal respiratory effort. Clear to auscultation, bilaterally without Rales/Wheezes/Rhonchi.   Cardiovascular:  Regular rate and rhythm with normal S1/S2 without murmurs, rubs or gallops. Abdomen:  Soft, non-tender, non-distended with normal bowel sounds. Musculoskelatal:  No edema  Neurologic:  Non-focal  Psychiatric:  Alert and oriented, normal insight  Skin:  No rashes or lesions. Capillary Refill:  Brisk, < 3 seconds. Peripheral Pulses:  +2 palpable, equal bilaterally.      Consults:    IP CONSULT TO HOSPITALIST  IP CONSULT TO GI  IP CONSULT TO Bethel Gambino MD

## 2023-01-13 NOTE — PROGRESS NOTES
Occupational Therapy  Facility/Department: [facility] C4 PCU  Daily Treatment Note  NAME: González Murray  : 1940  MRN: [id number]    Date of Service: 2023    Discharge Recommendations:  24 hour supervision or assist, Home with Home health OT  OT Equipment Recommendations  Equipment Needed: No      Patient Diagnosis(es): The primary encounter diagnosis was COPD exacerbation ([facility] [address] Diagnoses of Acute on chronic respiratory failure with hypoxia (HCC), ZENDEJAS (dyspnea on exertion), Acute kidney injury ([facility] [address] Transient hypotension, and Dysphagia, unspecified type were also pertinent to this visit. Assessment    Assessment: Pt tolerated therapy fair this date. Pt completed bed mob w/ Prabhu and STS w/ SBA. Pt transferred to bathroom w/ CGA, pt mildly unsteady w/ gait 2/2 \"not getting out of bed for a couple days\". Pt able to tolerate LB dressing and toileting fair, noted to be SOB but SpO2 WFL. Pt then ambulated to chair where she completed grooming task in stance, able to tolerate increased standing duration for dyanmic task. Pt noted again to have increased SOB, concluded session w/ seated BUE therex to increase circulation. Pt cont to present below her PLOF and benefits from inpatient OT services. Cont to rec home w/ 24hr assist and HHOT at d/c.   Activity Tolerance: Patient tolerated treatment well;Patient limited by fatigue;Patient limited by endurance  Discharge Recommendations: 24 hour supervision or assist;Home with Home health OT  Equipment Needed: No     AM-PAC Daily Activity Inpatient   How much help for putting on and taking off regular lower body clothing?: A Little  How much help for Bathing?: A Lot  How much help for Toileting?: A Little  How much help for putting on and taking off regular upper body clothing?: A Little  How much help for taking care of personal grooming?: A Little  How much help for eating meals?: None  AM-PAC Inpatient Daily Activity Raw Score: 18  AM-PAC Inpatient ADL T-Scale Score : 38.66  ADL Inpatient CMS 0-100% Score: 46.65  ADL Inpatient CMS G-Code Modifier : CK      Plan   Occupational Therapy Plan  Times Per Week: 2-4x/ week  Current Treatment Recommendations: Balance training;Functional mobility training; Endurance training;Positioning;Self-Care / ADL; Safety education & training;Gait training;Patient/Caregiver education & training     Restrictions  Restrictions/Precautions  Restrictions/Precautions: Fall Risk;General Precautions;Contact Precautions (MDRO)  Position Activity Restriction  Other position/activity restrictions: telemetry, 2L O2    Subjective   Subjective  Subjective: pt resting in bed upon arrival, agreeable to OT tx  Pain: pt endorsing 5/10 pain in throat  Orientation  Overall Orientation Status: Within Functional Limits  Orientation Level: Oriented X4  Pain: pt endorsing 5/10 pain in throat    Cognition  Overall Cognitive Status: Exceptions  Arousal/Alertness: Delayed responses to stimuli; Appropriate responses to stimuli (some delayed 2/2 hearing)  Following Commands: Follows one step commands with increased time  Attention Span: Attends with cues to redirect  Memory: Appears intact  Safety Judgement: Decreased awareness of need for assistance  Problem Solving: Decreased awareness of errors  Insights: Decreased awareness of deficits  Initiation: Requires cues for some  Sequencing: Does not require cues        Objective    Vitals  Vitals  Heart Rate: 84  Heart Rate Source: Monitor  BP: 124/79  BP Location: Right upper arm  BP Method: Automatic  Patient Position: Semi fowlers  MAP (Calculated): 94  SpO2: 99 %  O2 Device: Nasal cannula  Comment: 2L O2    Bed Mobility Training  Bed Mobility Training: Yes  Interventions: Verbal cues; Safety awareness training  Supine to Sit: Modified independent; Additional time (HOB elevated)  Sit to Supine: Other (comment) (pt in chair at EOS)  Balance  Sitting: Intact  Standing: Impaired  Standing - Static: Fair;Good  Standing - Dynamic: Fair;Occasional  Transfer Training  Transfer Training: Yes  Sit to Stand: Stand-by assistance; Additional time  Stand to Sit: Additional time;Stand-by assistance  Bed to Chair: Contact-guard assistance; Additional time (no AD)  Toilet Transfer: Contact-guard assistance; Additional time (w/ L grab bar)  Gait  Overall Level of Assistance: Contact-guard assistance; Additional time (pt ambulated throughout room w/o AD, minorly unsteady w/ gait throughout but no LOB)       ADL  Grooming: Contact guard assistance;Setup; Increased time to complete (in stance at chair)  Grooming Skilled Clinical Factors: oral care; pt able to tolerate ~1min standing to complete before requesting need to sit  LE Dressing: Contact guard assistance;Verbal cueing; Increased time to complete (in stance at toilet)  LE Dressing Skilled Clinical Factors: manage brief for toileting  Toileting: Supervision (seated at toilet)    OT Exercises  A/AROM Exercises: Pt tolerated x10 BUe therex seated in chair focusing on grasp and release, wrist flexion/extension, elbow flexion/extension, shoulder shurgs and protraction       Safety Devices  Type of Devices: Call light within reach;Nurse notified; Bed alarm in place;Gait belt;Patient at risk for falls; Left in chair;Chair alarm in place; All fall risk precautions in place  Restraints  Restraints Initially in Place: No     Patient Education  Education Given To: Patient  Education Provided: Role of Therapy;Plan of Care;Precautions  Education Provided Comments: disease specific: importance of OOB to chair for meals/short periods of time; use of RED/nurse call light for assist with need/transfers; importance of positioning for optimizing breathing  Education Method: Demonstration;Verbal  Barriers to Learning: None  Education Outcome: Verbalized understanding;Demonstrated understanding;Continued education needed    Goals  Short Term Goals  Time Frame for Short Term Goals: 1 week 1/12 - all goals extended through 1/20/2023 2/2 LOS  Short Term Goal 1: Pt to voice understanding with teach-back of 1/4 heart failure goals (see separate \"heart failure\" therapy note for details)-GOAL MET 1/5/23  Short Term Goal 2: Pt will complete LE dressing with SBA by 1/10; 1/10 mod assist with LE dressing - 1/13 Aimee in stance  Short Term Goal 3: PT will complete toilet transfers with Prabhu; 1/10 STG met  Short Term Goal 4: Pt will complete standing level ADLs with SBA for balance; 1/10 CGA standing ADL's for 30 seconds; - goal met 1/13, update goal to 1min w/ SBA  Patient Goals   Patient goals : \"to go home\"       Therapy Time   Individual Concurrent Group Co-treatment   Time In 1400         Time Out 1438         Minutes 38         Timed Code Treatment Minutes: 38 Minutes     If pt is unable to be seen after this session, please let this note serve as discharge summary. Please see case management note for discharge disposition. Thank you.      FREDI Wheatley/FELY

## 2023-01-13 NOTE — OP NOTE
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 Questions were answered. The patient's history was reviewed and a directed physical examination was performed prior to the procedure. Patient was monitored throughout the procedure with pulse oximetry and periodic assessment of vital signs. Patient was sedated as noted above. The Nursing staff and I performed a time out. With the patient in the left lateral decubitus position, the Olympus videoendoscope was placed in the patient's mouth and under direct visualization passed into the esophagus. The scope was ultimately passed to the third portion of the duodenum. Visualization was performed during both introduction and withdrawal of the endoscope and retroflexed view of the proximal stomach was obtained. Findings[de-identified]   Esophagus: Severe esophagitis, biopsied mid esophagus, markedly improved, w GEJ stricture dilated w 47 and 56 Fr Da Silva w/o difficulty. The findings do not support a diagnosis of Lubin's Esophagus. Stomach: normal  Duodenum: normal    Recommendations: -Acid suppression with a proton pump inhibitor. , -Await pathology.     Yariel Wynn MD (L) [phone]        Yary Olvera MD, MD

## 2023-01-13 NOTE — ANESTHESIA POSTPROCEDURE EVALUATION
Department of Anesthesiology  Postprocedure Note    Patient: Shey Reynolds  MRN: [id number]  YOB: 1940  Date of evaluation: 1/13/2023      Procedure Summary     Date: 01/13/23 Room / Location: [facility] [address] [address] / [facility]    Anesthesia Start: 6157 Anesthesia Stop: Anuj Chung    Procedures:       EGD BIOPSY      EGD ESOPHAGOGASTRODUODENOSCOPY DILATATION Diagnosis:       Dysphagia, unspecified type      (DYSPHAGIA)    Surgeons: Светлана Aly MD Responsible Provider: Sahara Medina MD    Anesthesia Type: MAC ASA Status: 4          Anesthesia Type: No value filed.     David Phase I: David Score: 10    David Phase II: David Score: 9      Anesthesia Post Evaluation    Patient location during evaluation: PACU  Patient participation: complete - patient participated  Level of consciousness: awake  Pain score: 0  Airway patency: patent  Nausea & Vomiting: no nausea  Complications: no  Cardiovascular status: blood pressure returned to baseline  Respiratory status: acceptable  Hydration status: euvolemic

## 2023-01-13 NOTE — PROGRESS NOTES
All oral medications given crushed and mixed with warm water, per patient request for ease of swallowing.

## 2023-01-13 NOTE — PROGRESS NOTES
Pt arrives in PACU resting quietly. Resp easy and regular. VS stable. Pt arouses easily to verbal stimulation. LR infusing. 900 cc LTC. Report from [employer] from Endo.

## 2023-01-13 NOTE — CONSULTS
Comprehensive Nutrition Assessment    Type and Reason for Visit:  Reassess, Consult    Nutrition Recommendations/Plan:   Modify diet to regular, CHRISTIANNE diet and encourage PO intake   RD to add ensure and magic cups  Monitor for appropriate time for CHF diet education  Monitor nutrition adequacy, pertinent labs, bowel habits, wt changes, and clinical progress     Malnutrition Assessment:  Malnutrition Status: At risk for malnutrition (Comment) (01/13/23 7801)    Context:  Acute Illness     Findings of the 6 clinical characteristics of malnutrition:  Energy Intake:  50% or less of estimated energy requirements for 5 or more days  Fluid Accumulation:  Mild Extremities    Nutrition Assessment:    Follow up/ consult for CHF diet education: On 2 L NC. S/p EGD with dilation today. Diet advanced from full liquid to CC5, cardiac diet today. PO intakes previously % of full liquid meals (mostly ice cream, broth). RD to liberalize diet to CHRISTIANNE and encourage PO intake. Wt varying in EMR, difficult to assess true wt loss. RD to add ensure and magic cups- monitor acceptance. Pt not appropriate for education at this time d/t poor intakes. Continue to encourage PO intake, will continue to monitor. Nutrition Related Findings:    No updated labs to review today. + 2 BM today. Non-pitting edema. Wound Type:  (scattered ecchymosis)       Current Nutrition Intake & Therapies:    Average Meal Intake: 51-75%, %, 26-50%  Average Supplements Intake: None Ordered  ADULT DIET; Regular; 5 carb choices (75 gm/meal); Low Fat/Low Chol/High Fiber/CHRISTIANNE    Anthropometric Measures:  Height: 5' 4\" (162.6 cm)  Ideal Body Weight (IBW): 120 lbs (55 kg)       Current Body Weight: 125 lb (56.7 kg),   IBW. Weight Source: Standing Scale  Current BMI (kg/m2): 21.4                          BMI Categories: Overweight (BMI 25.0-29. 9)    Estimated Daily Nutrient Needs:  Energy Requirements Based On: Kcal/kg  Weight Used for Energy Requirements: Ideal  Energy (kcal/day): 4288-5600 (30-35 kcal/54.5 kg)  Weight Used for Protein Requirements: Ideal  Protein (g/day): 71-82 (1.3-1.5 g/54.5 kg)  Method Used for Fluid Requirements: 1 ml/kcal  Fluid (ml/day):      Nutrition Diagnosis:   Increased nutrient needs related to increase demand for energy/nutrients, impaired respiratory function as evidenced by  (increased protein needs due to extended hospital stay; variable po intake and compromised lung function)    Nutrition Interventions:   Food and/or Nutrient Delivery: Modify Current Diet, Start Oral Nutrition Supplement  Nutrition Education/Counseling: Education not appropriate  Coordination of Nutrition Care: Continue to monitor while inpatient       Goals:     Goals: PO intake 75% or greater       Nutrition Monitoring and Evaluation:   Behavioral-Environmental Outcomes: None Identified  Food/Nutrient Intake Outcomes: Food and Nutrient Intake, Supplement Intake  Physical Signs/Symptoms Outcomes: Nutrition Focused Physical Findings, Biochemical Data, Weight    Discharge Planning:    Continue current diet, Continue Oral Nutrition Supplement     Keshawn Ambrose [address], [address] [place] [address],   Contact: Office: [phone]; [address]: [phone]

## 2023-01-13 NOTE — ANESTHESIA PRE PROCEDURE
Department of Anesthesiology  Preprocedure Note       Name:  Linda Lockhart   Age:  81 y.o.  :  1940                                          MRN:  [id number]         Date:  2023      Surgeon: Amy Fernandez):  Tra Olea MD    Procedure: Procedure(s):  EGD    Medications prior to admission:   Prior to Admission medications    Medication Sig Start Date End Date Taking?  Authorizing Provider   apixaban (ELIQUIS) 2.5 MG TABS tablet Take 1 tablet by mouth 2 times daily 22   Rogerio Butler MD   atorvastatin (LIPITOR) 40 MG tablet Take 1 tablet by mouth nightly 22   Rogerio Butler MD   busPIRone (BUSPAR) 5 MG tablet Take 5 mg by mouth 3 times daily    Historical Provider, MD   Fluticasone-Umeclidin-Vilant (Flores Anand) 200-62.5-25 MCG/INH AEPB Inhale 1 puff into the lungs daily    Historical Provider, MD   albuterol (PROVENTIL) (2.5 MG/3ML) 0.083% nebulizer solution Take 2.5 mg by nebulization every 4 hours as needed for Wheezing    Historical Provider, MD   sertraline (ZOLOFT) 50 MG tablet Take 50 mg by mouth daily    Historical Provider, MD   predniSONE (DELTASONE) 5 MG tablet Take 5 mg by mouth daily    Historical Provider, MD   guaiFENesin (MUCINEX) 600 MG extended release tablet Take 600 mg by mouth 2 times daily    Historical Provider, MD   Roflumilast (DALIRESP) 500 MCG tablet Take 500 mcg by mouth daily    Historical Provider, MD   OXYGEN Inhale 2 L into the lungs 21   Historical Provider, MD   ondansetron (ZOFRAN-ODT) 4 MG disintegrating tablet Take 1 tablet by mouth every 8 hours as needed for Nausea or Vomiting 21   Bryan Mccray MD   furosemide (LASIX) 20 MG tablet Take 1 tablet by mouth See Admin Instructions Tuesday, 21   Bryan Mccray MD   magnesium oxide (MAG-OX) 400 (241.3 Mg) MG TABS tablet Take 1 tablet by mouth daily 21   Bryan Mccray MD   fluticasone (FLONASE) 50 MCG/ACT nasal spray 1 spray by Each Nostril route daily as needed for Rhinitis 9/14/21   Larisa Agosto MD   ferrous sulfate (IRON 325) 325 (65 Fe) MG tablet Take 325 mg by mouth daily (with breakfast)    Historical Provider, MD   acetaminophen (TYLENOL) 500 MG tablet Take 500 mg by mouth every 6 hours as needed for Pain    Historical Provider, MD   metoprolol tartrate (LOPRESSOR) 25 MG tablet Take 1 tablet by mouth 2 times daily 9/24/19   Yolanda Benitez MD   docusate sodium (COLACE, DULCOLAX) 100 MG CAPS Take 100 mg by mouth 2 times daily  Patient not taking: Reported on 1/5/2023 9/24/19   Yolanda Benitez MD   clopidogrel (PLAVIX) 75 MG tablet Take 1 tablet by mouth daily 9/7/19   Randall Kaye MD       Current medications:    Current Facility-Administered Medications   Medication Dose Route Frequency Provider Last Rate Last Admin    lidocaine viscous hcl (XYLOCAINE) 2 % solution 15 mL  15 mL Mouth/Throat PRN CLIFTON Lucas - CNP   15 mL at 01/12/23 1154    famotidine (PEPCID) tablet 20 mg  20 mg Oral Daily Mena Anna MD   20 mg at 01/12/23 0953    sucralfate (CARAFATE) tablet 1 g  1 g Oral 4 times per day Mena Anna MD   1 g at 01/12/23 2325    calcium carbonate (TUMS) chewable tablet 500 mg  500 mg Oral Q6H PRN Diana Cordero MD   500 mg at 01/11/23 2203    pantoprazole (PROTONIX) injection 40 mg  40 mg IntraVENous BID Diana Cordero MD   40 mg at 01/12/23 2121    LORazepam (ATIVAN) tablet 0.5 mg  0.5 mg Oral Q6H PRN Diana Cordero MD   0.5 mg at 01/12/23 1024    [Held by provider] apixaban (ELIQUIS) tablet 2.5 mg  2.5 mg Oral BID Sam Serrano MD   2.5 mg at 01/08/23 2020    busPIRone (BUSPAR) tablet 5 mg  5 mg Oral TID Sam Serrano MD   5 mg at 01/12/23 2135    atorvastatin (LIPITOR) tablet 40 mg  40 mg Oral Nightly Sam Serrano MD   40 mg at 01/12/23 2135    [Held by provider] clopidogrel (PLAVIX) tablet 75 mg  75 mg Oral Daily Sam Serrano MD   75 mg at 01/08/23 0942    docusate sodium (COLACE) capsule 100 mg  100 mg Oral BID PRN Rosina Ring MD        ferrous sulfate (IRON 325) tablet 325 mg  325 mg Oral Daily with breakfast Rosina Ring MD   325 mg at 01/12/23 0953    fluticasone (FLONASE) 50 MCG/ACT nasal spray 1 spray  1 spray Each Nostril Daily PRN MD [facility]) extended release tablet 600 mg  600 mg Oral BID Rosina Ring MD   600 mg at 01/12/23 2135    magnesium oxide (MAG-OX) tablet 400 mg  400 mg Oral Daily Rosina Ring MD   400 mg at 01/12/23 0285    Roflumilast (DALIRESP) tablet 500 mcg  500 mcg Oral Daily Rosina Ring MD   500 mcg at 01/12/23 0954    sertraline (ZOLOFT) tablet 50 mg  50 mg Oral Daily Rosina Ring MD   50 mg at 01/12/23 0953    metoprolol tartrate (LOPRESSOR) tablet 25 mg  25 mg Oral BID Rosina Ring MD   25 mg at 01/11/23 2203    sodium chloride flush 0.9 % injection 10 mL  10 mL IntraVENous 2 times per day Rosina Ring MD   10 mL at 01/12/23 2121    sodium chloride flush 0.9 % injection 10 mL  10 mL IntraVENous PRN Rosina Ring MD        0.9 % sodium chloride infusion   IntraVENous PRN Rosina Ring  mL/hr at 01/09/23 1231 100 mL/hr at 01/09/23 1231    senna (SENOKOT) tablet 8.6 mg  1 tablet Oral Daily PRN Rosina Ring MD        polyethylene glycol (GLYCOLAX) packet 17 g  17 g Oral Daily PRN Rosina Ring MD        acetaminophen (TYLENOL) tablet 650 mg  650 mg Oral Q6H PRN Rosina Ring MD   650 mg at 01/12/23 2142    Or    acetaminophen (TYLENOL) suppository 650 mg  650 mg Rectal Q6H PRN Rosina Ring MD        ondansetron (ZOFRAN-ODT) disintegrating tablet 4 mg  4 mg Oral Q8H PRN Rosina Ring MD        Or    ondansetron [facility]) injection 4 mg  4 mg IntraVENous Q6H PRN Rosina Ring MD   4 mg at 01/09/23 0521    mometasone-formoterol (DULERA) 200-5 MCG/ACT inhaler 2 puff  2 puff Inhalation BID Rosina Ring MD   2 puff at 01/12/23 1945    tiotropium (SPIRIVA RESPIMAT) 2.5 MCG/ACT inhaler 2 puff  2 puff Inhalation Daily Sukhdeep Ricketts MD   2 puff at 01/12/23 0834    albuterol (PROVENTIL) nebulizer solution 2.5 mg  2.5 mg Nebulization Q4H WA Sukhdeep Ricketts MD   2.5 mg at 01/12/23 1940    perflutren lipid microspheres (DEFINITY) injection 1.5 mL  1.5 mL IntraVENous ONCE PRN Sukhdeep Ricketts MD        glucose chewable tablet 16 g  4 tablet Oral PRN Sukhdeep Ricketts MD        dextrose bolus 10% 125 mL  125 mL IntraVENous PRN Sukhdeep Ricketts MD        Or    dextrose bolus 10% 250 mL  250 mL IntraVENous PRN Sukhdeep Ricketts MD        glucagon (rDNA) injection 1 mg  1 mg SubCUTAneous PRN Sukhdeep Ricketts MD        dextrose 10 % infusion   IntraVENous Continuous PRN Sukhdeep Ricketts MD        insulin lispro (HUMALOG) injection vial 0-8 Units  0-8 Units SubCUTAneous TID WC Sukhdeep Ricketts MD        insulin lispro (HUMALOG) injection vial 0-4 Units  0-4 Units SubCUTAneous Nightly Sukhdeep Ricketts MD           Allergies:     Allergies   Allergen Reactions    Latex Other (See Comments)     Burning      Codeine Other (See Comments)     \"hallucinations\"       Problem List:    Patient Active Problem List   Diagnosis Code    Hyperlipidemia E78.5    Asthma J45.909    OA (osteoarthritis) M19.90    Tobacco use Z72.0    COPD exacerbation ([address] J44.1    Septicemia ([address] A41.9    Abnormal chest x-ray R93.89    COPD with acute exacerbation ([address] J44.1    Shortness of breath R06.02    Tobacco abuse Z72.0    Moderate malnutrition ([address] E44.0    NSTEMI (non-ST elevated myocardial infarction) ([address] I21.4    Acute respiratory failure with hypoxia (HCC) J96.01    CAD (coronary artery disease) I25.10    Acute pulmonary edema (HCC) J81.0    Pulmonary infiltrate R91.8    Elevated brain natriuretic peptide (BNP) level R79.89    Leukocytosis D72.829    Ischemic cardiomyopathy I25.5    Acute combined systolic and diastolic congestive heart failure (HCC) I50.41    Hypernatremia E87.0    NADJA (acute kidney injury) ([facility] N17.9    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    Mucus plugging of bronchi T17.500A    CAD in native artery I25.10    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    Pneumonia of both lower lobes due to methicillin resistant Staphylococcus aureus (MRSA) ([facility]) J15.212    GI bleed K92.2    Severe malnutrition ([facility]) E43    Chest pain R07.9    Chronic respiratory failure with hypoxia ([facility]) J96.11    Primary hypertension I10    Anemia D64.9    Acute respiratory failure ([facility]) J96.00    Acute respiratory distress R06.03    Volume overload E87.70    Demand ischemia ([facility]) I24.8    GERD (gastroesophageal reflux disease) K21.9    Acute respiratory failure with hypoxia and hypercapnia ([facility]) J96.01, J96.02    Shock ([facility]) R57.9    Pneumonia due to infectious organism J18.9    Acute on chronic respiratory failure with hypoxia and hypercapnia ([facility]) J96.21, J96.22    Chronic obstructive pulmonary disease ([facility]) J44.9    Acute on chronic respiratory failure with hypoxemia ([facility]) J96.21    Chronic anxiety F41.9    Acute decompensated heart failure ([facility]) I50.9    Acute respiratory failure with hypoxia and hypercarbia ([facility]) J96.01, J96.02    Acute kidney injury ([facility]) N17.9    Elevated procalcitonin R79.89    COPD, severe ([facility]) J44.9    Anxiety F41.9    Severe anxiety F41.9    Severe protein-calorie malnutrition ([facility]) E43    HCAP (healthcare-associated pneumonia) J18.9    Pleural effusion J90    Hyperglycemia R73.9    Sepsis ([facility]) A41.9    Tachycardia R00.0    Acute hypoxemic respiratory failure ([facility]) J96.01    Septic shock ([facility]) A41.9, R65.21    Bacteremia R78.81    Urinary tract infection without hematuria N39.0    Atrial fibrillation with RVR ([facility]) I48.91    Electrolyte disorder E87.8       Past Medical History:        Diagnosis Date    NADJA (acute kidney injury) ([facility]     Asthma     CAD (coronary artery disease)     [facility]     unsure    Chronic anxiety     COPD (chronic obstructive pulmonary disease) (HCC)     GERD (gastroesophageal reflux disease) 9/9/2021    History of blood transfusion     Hyperlipidemia     Hypertension     MRSA (methicillin resistant staph aureus) culture positive 09/22/2019    + resp cx    OA (osteoarthritis) 8/12/2014    Thyroid disease        Past Surgical History:        Procedure Laterality Date    BRONCHOSCOPY  09/08/2019    Dr. Willard Monroe - w/BAL   [address]  09/05/2019    Dr. Kirstin Moreira N/A 2/24/2020    COLONOSCOPY DIAGNOSTIC performed by Algernon Favre, DO at [facility] N/A 10/22/2021    COLONOSCOPY POLYPECTOMY SNARE/COLD BIOPSY performed by Grace Finn MD at [address] [place] [address] N/A 9/19/2019    OFF PUMP CORONARY ARTERY BYPASS GRAFTING X2, INTERNAL MAMMARY ARTERY, SAPHENOUS VEIN GRAFT performed by Soco De Leon MD at [facility] CATH  9/20/2021    IR MIDLINE CATH 9/20/2021 [address] PROCEDURES    MASTECTOMY, PARTIAL Left     SIGMOIDOSCOPY  02/27/2020    4 bands    SIGMOIDOSCOPY N/A 2/27/2020    FLEX SIG W/ BANDING SIGMOIDOSCOPY DIAGNOSTIC FLEXIBLE performed by Algernon Favre, DO at [facility] N/A 1/9/2023    EGD DIAGNOSTIC ONLY performed by Olivier Enamorado MD at [address] [place] [address] History:    Social History     Tobacco Use    Smoking status: Former     Packs/day: 0.50     Years: 50.00     Pack years: 25.00     Types: Cigarettes     Quit date: 9/19/2019     Years since quitting: 3.3    Smokeless tobacco: Never    Tobacco comments:     quit 2018   Substance Use Topics    Alcohol use:  No                                Counseling given: Not Answered  Tobacco comments: quit 2018      Vital Signs (Current):   Vitals:    01/12/23 2120 01/12/23 2128 01/12/23 2318 01/13/23 0620   BP:  98/66 129/83 107/62   Pulse: 86 94 94   Resp:  18 20 18   Temp:  97.7 °F (36.5 °C) 97.5 °F (36.4 °C) 97.8 °F (36.6 °C)   TempSrc:  Oral Oral    SpO2: 99%  96% 98%   Weight:    125 lb (56.7 kg)   Height:                                                  BP Readings from Last 3 Encounters:   01/13/23 107/62   04/09/22 124/64   10/26/21 122/78       NPO Status: Time of last liquid consumption: 0000                        Time of last solid consumption: 0000                        Date of last liquid consumption: 01/09/23                        Date of last solid food consumption: 01/09/23    BMI:   Wt Readings from Last 3 Encounters:   01/13/23 125 lb (56.7 kg)   04/09/22 127 lb 3.2 oz (57.7 kg)   10/26/21 99 lb 6 oz (45.1 kg)     Body mass index is 21.46 kg/m².     CBC:   Lab Results   Component Value Date/Time    WBC 7.4 01/13/2023 05:18 AM    RBC 3.89 01/13/2023 05:18 AM    HGB 12.0 01/13/2023 05:18 AM    HCT 36.8 01/13/2023 05:18 AM    MCV 94.7 01/13/2023 05:18 AM    RDW 13.9 01/13/2023 05:18 AM     01/13/2023 05:18 AM       CMP:   Lab Results   Component Value Date/Time     01/11/2023 04:59 AM    K 4.5 01/11/2023 04:59 AM    K 5.5 01/05/2023 05:31 AM     01/11/2023 04:59 AM    CO2 25 01/11/2023 04:59 AM    BUN 39 01/11/2023 04:59 AM    CREATININE 1.4 01/11/2023 04:59 AM    GFRAA 58 04/09/2022 05:15 AM    GFRAA >60 05/08/2013 06:09 AM    AGRATIO 0.9 01/05/2023 05:31 AM    LABGLOM 38 01/11/2023 04:59 AM    GLUCOSE 120 01/11/2023 04:59 AM    PROT 8.2 01/05/2023 05:31 AM    PROT 7.81 12/20/2012 05:42 PM    CALCIUM 9.4 01/11/2023 04:59 AM    BILITOT 0.3 01/05/2023 05:31 AM    ALKPHOS 71 01/05/2023 05:31 AM    AST 13 01/05/2023 05:31 AM    ALT 7 01/05/2023 05:31 AM       POC Tests:   Recent Labs     01/12/23 1951   POCGLU 90       Coags:   Lab Results   Component Value Date/Time    PROTIME 10.0 10/25/2021 04:18 AM    INR 0.89 10/25/2021 04:18 AM    APTT 30.4 09/28/2021 07:06 AM       HCG (If Applicable): No results found for: PREGTESTUR, PREGSERUM, HCG, HCGQUANT     ABGs:   Lab Results   Component Value Date/Time    PHART 7.439 04/05/2022 05:10 AM    PO2ART 83.9 04/05/2022 05:10 AM    FUC2LAU 38.9 04/05/2022 05:10 AM    DKO1VSZ 25.8 04/05/2022 05:10 AM    BEART 1.6 04/05/2022 05:10 AM    Y4BLVOES 96.6 04/05/2022 05:10 AM        Type & Screen (If Applicable):  No results found for: LABABO, LABRH    Drug/Infectious Status (If Applicable):  No results found for: HIV, HEPCAB    COVID-19 Screening (If Applicable):   Lab Results   Component Value Date/Time    COVID19 NOT DETECTED 01/04/2023 01:40 PM           Anesthesia Evaluation    Airway: Mallampati: II  TM distance: >3 FB   Neck ROM: full  Mouth opening: > = 3 FB   Dental: normal exam         Pulmonary:normal exam    (+) pneumonia:  COPD: severe,  shortness of breath:  asthma: current smoker                           Cardiovascular:    (+) hypertension:, past MI:, CAD:, CABG/stent (CABG 2/2019):, CHF (LVEF 40%):,       ECG reviewed      Echocardiogram reviewed                  Neuro/Psych:   (+) neuromuscular disease:,             GI/Hepatic/Renal:   (+) GERD:,           Endo/Other: Negative Endo/Other ROS                    Abdominal:             Vascular: Other Findings:           Anesthesia Plan      MAC     ASA 4       Induction: intravenous. Anesthetic plan and risks discussed with patient. Plan discussed with CRNA.     Attending anesthesiologist reviewed and agrees with Preprocedure content                HANK Hernandez MD   1/13/2023

## 2023-01-14 LAB
GLUCOSE BLD-MCNC: 123 MG/DL (ref 70–99)
GLUCOSE BLD-MCNC: 134 MG/DL (ref 70–99)
GLUCOSE BLD-MCNC: 88 MG/DL (ref 70–99)
GLUCOSE BLD-MCNC: 92 MG/DL (ref 70–99)
HCT VFR BLD CALC: 37.8 % (ref 36–48)
HEMOGLOBIN: 12.4 G/DL (ref 12–16)
MCH RBC QN AUTO: 31.2 PG (ref 26–34)
MCHC RBC AUTO-ENTMCNC: 32.8 G/DL (ref 31–36)
MCV RBC AUTO: 95 FL (ref 80–100)
PDW BLD-RTO: 14.1 % (ref 12.4–15.4)
PERFORMED ON: ABNORMAL
PERFORMED ON: ABNORMAL
PERFORMED ON: NORMAL
PERFORMED ON: NORMAL
PLATELET # BLD: 211 K/UL (ref 135–450)
PMV BLD AUTO: 7.7 FL (ref 5–10.5)
RBC # BLD: 3.98 M/UL (ref 4–5.2)
WBC # BLD: 8.7 K/UL (ref 4–11)

## 2023-01-14 PROCEDURE — 2700000000 HC OXYGEN THERAPY PER DAY

## 2023-01-14 PROCEDURE — 85027 COMPLETE CBC AUTOMATED: CPT

## 2023-01-14 PROCEDURE — 2580000003 HC RX 258: Performed by: INTERNAL MEDICINE

## 2023-01-14 PROCEDURE — 6370000000 HC RX 637 (ALT 250 FOR IP): Performed by: INTERNAL MEDICINE

## 2023-01-14 PROCEDURE — 94761 N-INVAS EAR/PLS OXIMETRY MLT: CPT

## 2023-01-14 PROCEDURE — 94640 AIRWAY INHALATION TREATMENT: CPT

## 2023-01-14 PROCEDURE — C9113 INJ PANTOPRAZOLE SODIUM, VIA: HCPCS | Performed by: INTERNAL MEDICINE

## 2023-01-14 PROCEDURE — 94669 MECHANICAL CHEST WALL OSCILL: CPT

## 2023-01-14 PROCEDURE — 6360000002 HC RX W HCPCS: Performed by: INTERNAL MEDICINE

## 2023-01-14 PROCEDURE — 36415 COLL VENOUS BLD VENIPUNCTURE: CPT

## 2023-01-14 PROCEDURE — 94660 CPAP INITIATION&MGMT: CPT

## 2023-01-14 PROCEDURE — 1200000000 HC SEMI PRIVATE

## 2023-01-14 RX ADMIN — SERTRALINE HYDROCHLORIDE 50 MG: 50 TABLET ORAL at 08:41

## 2023-01-14 RX ADMIN — ALBUTEROL SULFATE 2.5 MG: 2.5 SOLUTION RESPIRATORY (INHALATION) at 07:45

## 2023-01-14 RX ADMIN — ONDANSETRON 4 MG: 2 INJECTION INTRAMUSCULAR; INTRAVENOUS at 08:38

## 2023-01-14 RX ADMIN — SODIUM CHLORIDE, PRESERVATIVE FREE 10 ML: 5 INJECTION INTRAVENOUS at 21:10

## 2023-01-14 RX ADMIN — ROFLUMILAST 500 MCG: 500 TABLET ORAL at 08:48

## 2023-01-14 RX ADMIN — SODIUM CHLORIDE, PRESERVATIVE FREE 10 ML: 5 INJECTION INTRAVENOUS at 08:42

## 2023-01-14 RX ADMIN — ALBUTEROL SULFATE 2.5 MG: 2.5 SOLUTION RESPIRATORY (INHALATION) at 20:13

## 2023-01-14 RX ADMIN — Medication 2 PUFF: at 20:18

## 2023-01-14 RX ADMIN — PANTOPRAZOLE SODIUM 40 MG: 40 INJECTION, POWDER, FOR SOLUTION INTRAVENOUS at 08:42

## 2023-01-14 RX ADMIN — ACETAMINOPHEN 325MG 650 MG: 325 TABLET ORAL at 21:09

## 2023-01-14 RX ADMIN — PANTOPRAZOLE SODIUM 40 MG: 40 INJECTION, POWDER, FOR SOLUTION INTRAVENOUS at 21:10

## 2023-01-14 RX ADMIN — SUCRALFATE 1 G: 1 TABLET ORAL at 12:23

## 2023-01-14 RX ADMIN — SUCRALFATE 1 G: 1 TABLET ORAL at 18:31

## 2023-01-14 RX ADMIN — BUSPIRONE HYDROCHLORIDE 5 MG: 5 TABLET ORAL at 08:41

## 2023-01-14 RX ADMIN — GUAIFENESIN 600 MG: 600 TABLET, EXTENDED RELEASE ORAL at 21:09

## 2023-01-14 RX ADMIN — FAMOTIDINE 20 MG: 20 TABLET, FILM COATED ORAL at 08:41

## 2023-01-14 RX ADMIN — GUAIFENESIN 600 MG: 600 TABLET, EXTENDED RELEASE ORAL at 08:41

## 2023-01-14 RX ADMIN — ALBUTEROL SULFATE 2.5 MG: 2.5 SOLUTION RESPIRATORY (INHALATION) at 15:59

## 2023-01-14 RX ADMIN — BUSPIRONE HYDROCHLORIDE 5 MG: 5 TABLET ORAL at 21:09

## 2023-01-14 RX ADMIN — BUSPIRONE HYDROCHLORIDE 5 MG: 5 TABLET ORAL at 15:00

## 2023-01-14 RX ADMIN — METOPROLOL TARTRATE 25 MG: 25 TABLET, FILM COATED ORAL at 21:09

## 2023-01-14 RX ADMIN — SUCRALFATE 1 G: 1 TABLET ORAL at 06:17

## 2023-01-14 RX ADMIN — FERROUS SULFATE TAB 325 MG (65 MG ELEMENTAL FE) 325 MG: 325 (65 FE) TAB at 08:41

## 2023-01-14 RX ADMIN — ATORVASTATIN CALCIUM 40 MG: 40 TABLET, FILM COATED ORAL at 21:09

## 2023-01-14 RX ADMIN — METOPROLOL TARTRATE 25 MG: 25 TABLET, FILM COATED ORAL at 08:41

## 2023-01-14 RX ADMIN — SUCRALFATE 1 G: 1 TABLET ORAL at 23:52

## 2023-01-14 RX ADMIN — Medication 400 MG: at 08:41

## 2023-01-14 RX ADMIN — ALBUTEROL SULFATE 2.5 MG: 2.5 SOLUTION RESPIRATORY (INHALATION) at 11:49

## 2023-01-14 RX ADMIN — SUCRALFATE 1 G: 1 TABLET ORAL at 00:51

## 2023-01-14 RX ADMIN — Medication 2 PUFF: at 07:46

## 2023-01-14 RX ADMIN — TIOTROPIUM BROMIDE INHALATION SPRAY 2 PUFF: 3.12 SPRAY, METERED RESPIRATORY (INHALATION) at 07:46

## 2023-01-14 ASSESSMENT — PAIN SCALES - GENERAL
PAINLEVEL_OUTOF10: 0
PAINLEVEL_OUTOF10: 9

## 2023-01-14 ASSESSMENT — PAIN DESCRIPTION - DESCRIPTORS: DESCRIPTORS: ACHING

## 2023-01-14 ASSESSMENT — PAIN DESCRIPTION - LOCATION: LOCATION: HEAD

## 2023-01-14 NOTE — PROGRESS NOTES
01/14/23 1610   NIV Type   $NIV $Daily Charge   Skin Protection for O2 Device Yes   Orientation Middle   Location Nose   Intervention(s) Skin Barrier   NIV Started/Stopped On   Equipment Type v60   Mode Bilevel   Mask Type Nasal mask   Settings/Measurements   PIP Observed 13 cm H20   IPAP 12 cmH20   CPAP/EPAP 6 cmH2O   Vt (Measured) 595 mL   Rate Ordered 16   Resp 26   FiO2  30 %   I Time/ I Time % 0.9 s   Minute Volume (L/min) 13.1 Liters   Mask Leak (lpm) 14 lpm   Comfort Level Good   Using Accessory Muscles No   SpO2 97   Breath Sounds   Right Upper Lobe Diminished   Right Middle Lobe Diminished   Right Lower Lobe Diminished   Left Upper Lobe Diminished   Left Lower Lobe Diminished   Alarm Settings   Alarms On Y   Low Pressure (cmH2O) 6 cmH2O   High Pressure (cmH2O) 30 cmH2O   Apnea (secs) 20 secs   RR Low (bpm) 6   RR High (bpm) 40 br/min   PATIENT REQUESTING TO BE PLACED ON BIPAP AT THIS TIME

## 2023-01-14 NOTE — PROGRESS NOTES
PROGRESS NOTE  S:82 yrs Patient  admitted on 1/4/2023 with ZENDEJAS (dyspnea on exertion) [R06.09]  COPD exacerbation (HCC) [J44.1]  Acute kidney injury ([facility] [N17.9]  Transient hypotension [I95.9]  Acute on chronic respiratory failure with hypoxia (HCC) [J96.21]  Acute on chronic respiratory failure with hypoxia and hypercapnia (HCC) [J96.21, J96.22] . Symptoms improving    Current Hospital Schedued Meds   famotidine  20 mg Oral Daily    sucralfate  1 g Oral 4 times per day    pantoprazole  40 mg IntraVENous BID    [Held by provider] apixaban  2.5 mg Oral BID    busPIRone  5 mg Oral TID    atorvastatin  40 mg Oral Nightly    [Held by provider] clopidogrel  75 mg Oral Daily    ferrous sulfate  325 mg Oral Daily with breakfast    guaiFENesin  600 mg Oral BID    magnesium oxide  400 mg Oral Daily    Roflumilast  500 mcg Oral Daily    sertraline  50 mg Oral Daily    metoprolol tartrate  25 mg Oral BID    sodium chloride flush  10 mL IntraVENous 2 times per day    mometasone-formoterol  2 puff Inhalation BID    tiotropium  2 puff Inhalation Daily    albuterol  2.5 mg Nebulization Q4H WA    insulin lispro  0-8 Units SubCUTAneous TID WC    insulin lispro  0-4 Units SubCUTAneous Nightly     Current Hospital IV Meds   sodium chloride 100 mL/hr (01/09/23 1231)    dextrose       Current Hospital PRN Meds  lidocaine viscous hcl, calcium carbonate, LORazepam, docusate sodium, fluticasone, sodium chloride flush, sodium chloride, senna, polyethylene glycol, acetaminophen **OR** acetaminophen, ondansetron **OR** ondansetron, perflutren lipid microspheres, glucose, dextrose bolus **OR** dextrose bolus, glucagon (rDNA), dextrose    Exam:   Vitals:    01/14/23 1150   BP:    Pulse:    Resp:    Temp:    SpO2: 98%     I/O last 3 completed shifts:   In: 100 [I.V.:100]  Out: 0    General appearance: alert, appears stated age, and cooperative  HEENT: PERRLA  Neck: no adenopathy, no carotid bruit, no JVD, supple, symmetrical, trachea midline, and thyroid not enlarged, symmetric, no tenderness/mass/nodules  Lungs: clear to auscultation bilaterally  Heart: regular rate and rhythm, S1, S2 normal, no murmur, click, rub or gallop  Abdomen: soft, non-tender; bowel sounds normal; no masses,  no organomegaly  Extremities: extremities normal, atraumatic, no cyanosis or edema     Labs:  CBC:   Recent Labs     01/12/23  0526 01/13/23  0518 01/14/23  0452   WBC 8.4 7.4 8.7   HGB 11.8* 12.0 12.4   HCT 36.6 36.8 37.8   MCV 94.1 94.7 95.0    225 211     BMP: No results for input(s): NA, K, CL, CO2, PHOS, BUN, CREATININE, CA in the last 72 hours. LIVER PROFILE: No results for input(s): AST, ALT, LIPASE, PROT, BILIDIR, BILITOT, ALKPHOS in the last 72 hours. Invalid input(s): AMYLASE,  ALB  PT/INR: No results for input(s): INR in the last 72 hours. Invalid input(s): PT    IMAGING:  No results found.      Hospital Problems             Last Modified POA    * (Principal) Acute on chronic respiratory failure with hypoxia and hypercapnia (HCC) 1/4/2023 Yes    Tobacco abuse 1/4/2023 Yes    S/P CABG x 2 1/4/2023 Yes    Primary hypertension 1/4/2023 Yes    Acute respiratory failure with hypoxia and hypercapnia ([address] 1/4/2023 Yes    Acute decompensated heart failure ([address] 1/4/2023 Yes    Acute kidney injury ([address] 1/4/2023 Yes    COPD, severe ([address] 1/4/2023 Yes      Impression:  80 yo female with osynophagia, dysphagia s/p EGD with dilation    Recommendation:  BID PPI for 8 weeks  GI clinic follow up in 6-8 weeks  Call with questions or concerns      Citlalli Shelton DO  1:56 PM 1/14/2023            [facility]    [address]      [address] [place] [address]     Phone: [phone]     Fax: [phone]

## 2023-01-14 NOTE — PROGRESS NOTES
Hospitalist Progress Note      PCP: Carolina Patterson MD    Date of Admission: 1/4/2023    Chief Complaint:  chest pain, sob    Hospital Course:      81-year-old female with PMH significant for COPD on home oxygen 3 L, CHF/systolic, CAD, chronic kidney disease stage III, accessible atrial fibrillation, diabetes mellitus type 2, hypertension, GERD, hyperlipidemia. Her shortness of breath is improving, she has been treated with IV steroids bronchodilators and supplemental oxygen. Is also noted to have congestive heart failure acute on chronic and this is improved with IV Lasix. Her chest pain was suspicious for GI etiology and GI was consulted. She underwent EGD on 1/9/2023. She had been on Plavix and Eliquis and these were held for several days and she had a repeat EGD done on 1/13/2023. This revealed severe esophagitis, with a GEJ stricture which was dilated. A biopsy was also sent. Subjective: She is sitting up in bed, in no acute distress. She states shortness of breath is improving.   Eating and drinking fairly well      Medications:  Reviewed    Infusion Medications    sodium chloride 100 mL/hr (01/09/23 1231)    dextrose       Scheduled Medications    famotidine  20 mg Oral Daily    sucralfate  1 g Oral 4 times per day    pantoprazole  40 mg IntraVENous BID    [Held by provider] apixaban  2.5 mg Oral BID    busPIRone  5 mg Oral TID    atorvastatin  40 mg Oral Nightly    [Held by provider] clopidogrel  75 mg Oral Daily    ferrous sulfate  325 mg Oral Daily with breakfast    guaiFENesin  600 mg Oral BID    magnesium oxide  400 mg Oral Daily    Roflumilast  500 mcg Oral Daily    sertraline  50 mg Oral Daily    metoprolol tartrate  25 mg Oral BID    sodium chloride flush  10 mL IntraVENous 2 times per day    mometasone-formoterol  2 puff Inhalation BID    tiotropium  2 puff Inhalation Daily    albuterol  2.5 mg Nebulization Q4H WA    insulin lispro  0-8 Units SubCUTAneous TID WC    insulin lispro  0-4 Units SubCUTAneous Nightly     PRN Meds: lidocaine viscous hcl, calcium carbonate, LORazepam, docusate sodium, fluticasone, sodium chloride flush, sodium chloride, senna, polyethylene glycol, acetaminophen **OR** acetaminophen, ondansetron **OR** ondansetron, perflutren lipid microspheres, glucose, dextrose bolus **OR** dextrose bolus, glucagon (rDNA), dextrose      Intake/Output Summary (Last 24 hours) at 1/14/2023 1512  Last data filed at 1/14/2023 0550  Gross per 24 hour   Intake 0 ml   Output 0 ml   Net 0 ml       Physical Exam Performed:    /70   Pulse 88   Temp 98 °F (36.7 °C) (Oral)   Resp 20   Ht 5' 4\" (1.626 m)   Wt 123 lb 14.4 oz (56.2 kg)   SpO2 98%   BMI 21.27 kg/m²     General appearance: Elderly female sitting up in bed, in no acute distress is alert  and cooperative. HEENT: Pupils equal, round, and reactive to light. Conjunctivae/corneas clear. Neck: Supple, with full range of motion. No jugular venous distention. Trachea midline. Respiratory:  Normal respiratory effort. Clear to auscultation, bilaterally without Rales/Wheezes/Rhonchi. Cardiovascular: Regular rate and rhythm with normal S1/S2   Abdomen: Soft, non-tender, non-distended with normal bowel sounds. Musculoskeletal: No clubbing, cyanosis or edema bilaterally. Full range of motion without deformity. Neurologic:  Neurovascularly intact without any focal sensory/motor deficits. Cranial nerves: II-XII intact, grossly non-focal.  Psychiatric: Alert and oriented, thought content appropriate, normal insight  Capillary Refill: Brisk, 3 seconds, normal  Peripheral Pulses: +2 palpable, equal bilaterally       Labs:   Recent Labs     01/12/23  0526 01/13/23  0518 01/14/23  0452   WBC 8.4 7.4 8.7   HGB 11.8* 12.0 12.4   HCT 36.6 36.8 37.8    225 211     No results for input(s): NA, K, CL, CO2, BUN, CREATININE, CALCIUM, PHOS in the last 72 hours.     Invalid input(s): MAGNES  No results for input(s): AST, ALT, BILIDIR, BILITOT, ALKPHOS in the last 72 hours. No results for input(s): INR in the last 72 hours. No results for input(s): Sarah Basques in the last 72 hours. Urinalysis:      Lab Results   Component Value Date/Time    NITRU Negative 01/04/2023 02:31 PM    WBCUA 0-2 01/04/2023 02:31 PM    BACTERIA 3+ 03/30/2022 01:46 PM    RBCUA None seen 01/04/2023 02:31 PM    BLOODU SMALL 01/04/2023 02:31 PM    SPECGRAV 1.020 01/04/2023 02:31 PM    GLUCOSEU Negative 01/04/2023 02:31 PM       Radiology:  CT CHEST WO CONTRAST   Final Result   Emphysema. There be early fibrotic changes. This is not typical for UIP. XR CHEST PORTABLE   Final Result   No acute cardiopulmonary findings             IP CONSULT TO HOSPITALIST  IP CONSULT TO GI  IP CONSULT TO HEART FAILURE NURSE/COORDINATOR  IP CONSULT TO DIETITIAN    Assessment/Plan:    Active Hospital Problems    Diagnosis     COPD, severe ([address] [J44.9]     Acute kidney injury ([address] [N17.9]     Acute decompensated heart failure ([address] [I50.9]     Acute on chronic respiratory failure with hypoxia and hypercapnia (HCC) [J96.21, J96.22]     Acute respiratory failure with hypoxia and hypercapnia (HCC) [J96.01, J96.02]     Primary hypertension [I10]     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 EGD showed severe esophagitis, black discoloration. Unable to biopsy d/t Eliquis and Plavix. These were on hold pending repeat EGD/biopsy 1/13. EGD showed some improvement in ulcerative esophagitis; biopsy obtained and dilation performed. Continue PPI twice daily. Pepcid, Carafate slurry, viscous lidocaine. Tums PRN. Symptoms improving. If she remains stable will resume Eliquis and Plavix in the morning. --Acute COPD exacerbation: Improving. Completed steroids. Continue scheduled and as needed DuoNebs. Supplemental oxygen. --Acute on Chronic Systolic CHF: Noted to have pulmonary edema on presentation.  Treated with IV Lasix x3 doses. Clinically resolved. ECHO showed LVEF 40% with mild global hypokinesis. This is a change from her most recent testing and would recommend outpatient follow-up with Cardiology pending resolution of GI issues. Changed Lopressor to Toprol XL. Not on ACEI/ARB d/t CKD. [facility]      --CAD: Reviewed prior Cardiology notes. 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 Most recent Cath 9/4/2019 noted EF=35-40%; CAD involving ostial left main disease and mid LAD disease. Most recent Echo 8/31/2021 EF=55-60%. Most recent Camryn-Myoview 9/1/2021 noted EF=55% negative for ischemia. As above, her repeat Echo showed LVEF 40% with global hypokinesis. Recommend outpatient follow-up with Cardiology pending resolution of GI issues. Resume Plavix after EGD/Biopsy when ok with GI.      -CKD Stage 3: Slightly worse than baseline this admission, likely in part d/t poor PO intake prior to admission, and diuresis. Currently holding Lasix and monitoring. --Acute hypoxic and hypercapnic respiratory failure: Was on BiPAP overnight, currently weaned off. Now on 2 L of [place] O2 which is baseline requirement for her. --Paroxysmal A. fib: Fairly rate controlled at this point. Continue outpatient medication as ordered. Patient is on Eliquis for anticoagulation; on hold. --DM type II: Continue subcu insulin regimen including long-acting and short acting insulin as ordered, continue SSI protocol for more adequate glycemic control.     --HTN, controlled, cont home meds as ordered      --GERD: Cont home med      --HLD: Continue current statin therapy       --Anxiety/Depression: Patient seems very anxious shakiness appears to be more related to this than the effect of steroid. Will place on as needed Ativan      - Diarrhea: Etiology unclear. Developed 1/13 after EGD. ?related to recent liquid diet.  Not severe and not c/w C.diff at this time, although she does have a history of it.  We will continue to follow and seems to be improving today    DVT Prophylaxis: Has been on Eliquis  Diet: ADULT DIET; Regular;  No Added Salt (3-4 gm)  ADULT ORAL NUTRITION SUPPLEMENT; Breakfast, Dinner; Standard High Calorie/High Protein Oral Supplement  ADULT ORAL NUTRITION SUPPLEMENT; Lunch, Dinner; Frozen Oral Supplement  Code Status: Full Code  PT/OT Eval Status: Consulted and recommend home with supervision    Dispo -1 to 2 days    Appropriate for A1 Discharge Unit: Abril Herrera MD

## 2023-01-15 VITALS
HEIGHT: 64 IN | RESPIRATION RATE: 20 BRPM | BODY MASS INDEX: 25.07 KG/M2 | WEIGHT: 146.83 LBS | HEART RATE: 83 BPM | SYSTOLIC BLOOD PRESSURE: 92 MMHG | OXYGEN SATURATION: 97 % | DIASTOLIC BLOOD PRESSURE: 64 MMHG | TEMPERATURE: 98.3 F

## 2023-01-15 LAB
GLUCOSE BLD-MCNC: 101 MG/DL (ref 70–99)
GLUCOSE BLD-MCNC: 103 MG/DL (ref 70–99)
PERFORMED ON: ABNORMAL
PERFORMED ON: ABNORMAL

## 2023-01-15 PROCEDURE — 6360000002 HC RX W HCPCS: Performed by: INTERNAL MEDICINE

## 2023-01-15 PROCEDURE — 2580000003 HC RX 258: Performed by: INTERNAL MEDICINE

## 2023-01-15 PROCEDURE — 94669 MECHANICAL CHEST WALL OSCILL: CPT

## 2023-01-15 PROCEDURE — 94640 AIRWAY INHALATION TREATMENT: CPT

## 2023-01-15 PROCEDURE — 6370000000 HC RX 637 (ALT 250 FOR IP): Performed by: INTERNAL MEDICINE

## 2023-01-15 PROCEDURE — C9113 INJ PANTOPRAZOLE SODIUM, VIA: HCPCS | Performed by: INTERNAL MEDICINE

## 2023-01-15 PROCEDURE — 94761 N-INVAS EAR/PLS OXIMETRY MLT: CPT

## 2023-01-15 PROCEDURE — 51798 US URINE CAPACITY MEASURE: CPT

## 2023-01-15 PROCEDURE — 2700000000 HC OXYGEN THERAPY PER DAY

## 2023-01-15 RX ORDER — BUSPIRONE HYDROCHLORIDE 5 MG/1
5 TABLET ORAL 3 TIMES DAILY
Qty: 90 TABLET | Refills: 1 | Status: SHIPPED | OUTPATIENT
Start: 2023-01-15

## 2023-01-15 RX ORDER — SUCRALFATE 1 G/1
1 TABLET ORAL 4 TIMES DAILY
Qty: 120 TABLET | Refills: 1 | Status: SHIPPED | OUTPATIENT
Start: 2023-01-15

## 2023-01-15 RX ORDER — PANTOPRAZOLE SODIUM 40 MG/1
40 TABLET, DELAYED RELEASE ORAL
Qty: 60 TABLET | Refills: 1 | Status: SHIPPED | OUTPATIENT
Start: 2023-01-15

## 2023-01-15 RX ORDER — PANTOPRAZOLE SODIUM 40 MG/1
40 TABLET, DELAYED RELEASE ORAL
Status: DISCONTINUED | OUTPATIENT
Start: 2023-01-15 | End: 2023-01-15 | Stop reason: HOSPADM

## 2023-01-15 RX ORDER — CALCIUM CARBONATE 200(500)MG
1 TABLET,CHEWABLE ORAL EVERY 6 HOURS PRN
Qty: 60 TABLET | Refills: 1 | Status: SHIPPED | OUTPATIENT
Start: 2023-01-15 | End: 2023-02-14

## 2023-01-15 RX ADMIN — ALBUTEROL SULFATE 2.5 MG: 2.5 SOLUTION RESPIRATORY (INHALATION) at 08:42

## 2023-01-15 RX ADMIN — SUCRALFATE 1 G: 1 TABLET ORAL at 05:28

## 2023-01-15 RX ADMIN — FERROUS SULFATE TAB 325 MG (65 MG ELEMENTAL FE) 325 MG: 325 (65 FE) TAB at 09:21

## 2023-01-15 RX ADMIN — ROFLUMILAST 500 MCG: 500 TABLET ORAL at 09:21

## 2023-01-15 RX ADMIN — BUSPIRONE HYDROCHLORIDE 5 MG: 5 TABLET ORAL at 09:20

## 2023-01-15 RX ADMIN — CLOPIDOGREL BISULFATE 75 MG: 75 TABLET ORAL at 09:20

## 2023-01-15 RX ADMIN — ALBUTEROL SULFATE 2.5 MG: 2.5 SOLUTION RESPIRATORY (INHALATION) at 11:47

## 2023-01-15 RX ADMIN — FAMOTIDINE 20 MG: 20 TABLET, FILM COATED ORAL at 09:20

## 2023-01-15 RX ADMIN — APIXABAN 2.5 MG: 2.5 TABLET, FILM COATED ORAL at 09:20

## 2023-01-15 RX ADMIN — Medication 2 PUFF: at 08:43

## 2023-01-15 RX ADMIN — SERTRALINE HYDROCHLORIDE 50 MG: 50 TABLET ORAL at 09:20

## 2023-01-15 RX ADMIN — METOPROLOL TARTRATE 25 MG: 25 TABLET, FILM COATED ORAL at 09:20

## 2023-01-15 RX ADMIN — SODIUM CHLORIDE, PRESERVATIVE FREE 10 ML: 5 INJECTION INTRAVENOUS at 09:21

## 2023-01-15 RX ADMIN — ONDANSETRON 4 MG: 2 INJECTION INTRAMUSCULAR; INTRAVENOUS at 05:00

## 2023-01-15 RX ADMIN — GUAIFENESIN 600 MG: 600 TABLET, EXTENDED RELEASE ORAL at 09:20

## 2023-01-15 RX ADMIN — PANTOPRAZOLE SODIUM 40 MG: 40 INJECTION, POWDER, FOR SOLUTION INTRAVENOUS at 09:20

## 2023-01-15 RX ADMIN — Medication 400 MG: at 09:20

## 2023-01-15 RX ADMIN — TIOTROPIUM BROMIDE INHALATION SPRAY 2 PUFF: 3.12 SPRAY, METERED RESPIRATORY (INHALATION) at 08:43

## 2023-01-15 ASSESSMENT — PAIN SCALES - GENERAL: PAINLEVEL_OUTOF10: 0

## 2023-01-15 NOTE — CARE COORDINATION
CASE MANAGEMENT DISCHARGE SUMMARY      Discharge to: home with [facility], when order is received, will fax         [address] ordered/agency: none, has O2 concentrator     Transportation: private [facility] in room        Confirmed discharge plan with:     Patient: yes     Family:  yes at bedside      Facility/Agency, name:  [facility] faxed [facility] when order received. Orders faxed to [facility].      RN, name: Shonda Moyer RN

## 2023-01-15 NOTE — DISCHARGE SUMMARY
Hospital Medicine Discharge Summary    Patient ID: Lisa Navarro      Patient's PCP: Ayana De La Rosa MD    Admit Date: 1/4/2023     Discharge Date: 1/15/2023      Admitting Provider: Jorge A Buck MD     Discharge Provider: Florence Loera MD     Discharge Diagnoses: Active Hospital Problems    Diagnosis     COPD, severe ([facility] [J44.9]     Acute kidney injury ([facility] [N17.9]     Acute decompensated heart failure ([facility] [I50.9]     Acute on chronic respiratory failure with hypoxia and hypercapnia (HCC) [J96.21, J96.22]     Acute respiratory failure with hypoxia and hypercapnia (HCC) [J96.01, J96.02]     Primary hypertension [I10]     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 Hospital Course:     79-year-old female with PMH significant for COPD on home oxygen 3 L, CHF/systolic, CAD, chronic kidney disease stage III, paroxysmal atrial fibrillation, diabetes mellitus type 2, hypertension, GERD, hyperlipidemia. She presented shortness of breath and chest pain. Her shortness of breath is improved, she has been treated with IV steroids bronchodilators and supplemental oxygen. Is also noted to have congestive heart failure acute on chronic and this is improved with IV Lasix. Her chest pain was suspicious for GI etiology and GI was consulted. She underwent EGD on 1/9/2023. She had been on Plavix and Eliquis and these were held for several days and she had a repeat EGD done on 1/13/2023. This revealed severe esophagitis, with a GEJ stricture which was dilated. A biopsy was also sent. She is feeling better now, tolerating oral intake and is stable for discharge home with continued outpatient follow-up. Course per issues    Chest pain 2/2 GERD/Severe Esophagitis: GI consulted. EGD showed severe esophagitis, black discoloration.  Unable to biopsy d/t Eliquis and Plavix. These were on hold pending repeat EGD/biopsy 1/13. EGD showed some improvement in ulcerative esophagitis; biopsy obtained and dilation performed. Continue PPI twice daily for the next 8 weeks. Continue Carafate slurry,Tums PRN. Symptoms improving. She is to follow-up with the GI clinic in the next 6 to 8 weeks. I discussed this with the patient in detail and she expressed understanding and the importance of being compliant follow-up. --Acute COPD exacerbation: Improved Completed steroids. Continue scheduled and as needed DuoNebs. Continue supplemental oxygen she is on 3 L nasal cannula at home and she has her supplies at home she informed me. --Acute on Chronic Systolic CHF: Noted to have pulmonary edema on presentation. Treated with IV Lasix x3 doses. Clinically resolved. ECHO showed LVEF 40% with mild global hypokinesis. This is a change from her most recent testing and would recommend outpatient follow-up with Cardiology pending resolution of GI issues. Changed Lopressor to Toprol XL. Not on ACEI/ARB d/t CKD. resuming home Lasix at [facility].      --CAD: Reviewed prior Cardiology notes. 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 Most recent Cath 9/4/2019 noted EF=35-40%; CAD involving ostial left main disease and mid LAD disease. Most recent Echo 8/31/2021 EF=55-60%. Most recent Camryn-Myoview 9/1/2021 noted EF=55% negative for ischemia. As above, her repeat Echo showed LVEF 40% with global hypokinesis. Recommend outpatient follow-up with Cardiology pending resolution of GI issues. -CKD Stage 3: Slightly worse than baseline this admission, likely in part d/t poor PO intake prior to admission, and diuresis. Her kidney function is stabilized and she will continue to follow with PCP as an outpatient     --Paroxysmal A. fib: Fairly rate controlled at this point. Continue outpatient medication as ordered. Patient is on Eliquis for anticoagulation.      --DM type II: Continue subcu insulin regimen including long-acting and short acting insulin as ordered, continue SSI protocol for more adequate glycemic control. She will follow outpatient with PCP, she will follow blood sugars at home. Discussed benefit of exercise and dietary modification she expressed understanding.     --HTN, controlled, and current medications, follow-up with PCP within the next 1 to 2 weeks        --HLD: Continue current statin therapy follow-up with PCP           Physical Exam Performed:     BP 92/64   Pulse 83   Temp 98.3 °F (36.8 °C) (Oral)   Resp 20   Ht 5' 4\" (1.626 m)   Wt 146 lb 13.2 oz (66.6 kg)   SpO2 97%   BMI 25.20 kg/m²     General appearance: Elderly female sitting up in bed, in no acute distress is alert  and cooperative. HEENT: Pupils equal, round, and reactive to light. Conjunctivae/corneas clear. Neck: Supple, with full range of motion. No jugular venous distention. Trachea midline. Respiratory:  Normal respiratory effort. Clear to auscultation, bilaterally without Rales/Wheezes/Rhonchi. Cardiovascular: Regular rate and rhythm with normal S1/S2   Abdomen: Soft, non-tender, non-distended with normal bowel sounds. Musculoskeletal: No clubbing, cyanosis or edema bilaterally. Full range of motion without deformity. Neurologic:  Neurovascularly intact without any focal sensory/motor deficits. Cranial nerves: II-XII intact, grossly non-focal.  Psychiatric: Alert and oriented, thought content appropriate, normal insight  Capillary Refill: Brisk, 3 seconds, normal  Peripheral Pulses: +2 palpable, equal bilaterally       Labs:  For convenience and continuity at follow-up the following most recent labs are provided:      CBC:    Lab Results   Component Value Date/Time    WBC 8.7 01/14/2023 04:52 AM    HGB 12.4 01/14/2023 04:52 AM    HCT 37.8 01/14/2023 04:52 AM     01/14/2023 04:52 AM       Renal:    Lab Results   Component Value Date/Time     01/11/2023 04:59 AM K 4.5 01/11/2023 04:59 AM    K 5.5 01/05/2023 05:31 AM     01/11/2023 04:59 AM    CO2 25 01/11/2023 04:59 AM    BUN 39 01/11/2023 04:59 AM    CREATININE 1.4 01/11/2023 04:59 AM    CALCIUM 9.4 01/11/2023 04:59 AM    PHOS 2.6 10/13/2021 04:22 AM         Significant Diagnostic Studies    Radiology:   CT CHEST WO CONTRAST   Final Result   Emphysema. There be early fibrotic changes. This is not typical for UIP. XR CHEST PORTABLE   Final Result   No acute cardiopulmonary findings                Consults:     IP CONSULT TO HOSPITALIST  IP CONSULT TO GI  IP CONSULT TO HEART FAILURE NURSE/COORDINATOR  IP CONSULT TO DIETITIAN  IP CONSULT TO HOME CARE NEEDS    Disposition: Home    Condition at Discharge: Stable    Discharge Instructions/Follow-up: Follow-up with PCP within next 1 week  Follow-up with gastroenterology at their office within next 6 to 8 weeks. Discussed with patient importance of being compliant with appointments. Code Status:  Prior     Activity: activity as tolerated    Diet: regular diet and low fat, low cholesterol diet      Discharge Medications:     Discharge Medication List as of 1/15/2023 12:09 PM             Details   calcium carbonate (TUMS) 500 MG chewable tablet Take 1 tablet by mouth every 6 hours as needed for Heartburn, Disp-60 tablet, R-1Print      sucralfate (CARAFATE) 1 GM tablet Take 1 tablet by mouth 4 times daily, Disp-120 tablet, R-1Print                Details   !! busPIRone (BUSPAR) 5 MG tablet Take 1 tablet by mouth 3 times daily, Disp-90 tablet, R-1Print      pantoprazole (PROTONIX) 40 MG tablet Take 1 tablet by mouth 2 times daily (before meals), Disp-60 tablet, R-1Print       !! - Potential duplicate medications found. Please discuss with provider.              Details   apixaban (ELIQUIS) 2.5 MG TABS tablet Take 1 tablet by mouth 2 times daily, Disp-60 tablet, R-2Normal      atorvastatin (LIPITOR) 40 MG tablet Take 1 tablet by mouth nightly, Disp-30 tablet, R-3Normal      !! busPIRone (BUSPAR) 5 MG tablet Take 5 mg by mouth 3 times dailyHistorical Med      Fluticasone-Umeclidin-Vilant (TRELEGY ELLIPTA) 200-62.5-25 MCG/INH AEPB Inhale 1 puff into the lungs dailyHistorical Med      albuterol (PROVENTIL) (2.5 MG/3ML) 0.083% nebulizer solution Take 2.5 mg by nebulization every 4 hours as needed for WheezingHistorical Med      sertraline (ZOLOFT) 50 MG tablet Take 50 mg by mouth dailyHistorical Med      predniSONE (DELTASONE) 5 MG tablet Take 5 mg by mouth dailyHistorical Med      guaiFENesin (MUCINEX) 600 MG extended release tablet Take 600 mg by mouth 2 times dailyHistorical Med      Roflumilast (DALIRESP) 500 MCG tablet Take 500 mcg by mouth dailyHistorical Med      OXYGEN Inhale 2 L into the lungsHistorical Med      ondansetron (ZOFRAN-ODT) 4 MG disintegrating tablet Take 1 tablet by mouth every 8 hours as needed for Nausea or VomitingDC to [facility]      furosemide (LASIX) 20 MG tablet Take 1 tablet by mouth See Admin Instructions Tuesday, fridayDC to SNF      magnesium oxide (MAG-OX) 400 (241.3 Mg) MG TABS tablet Take 1 tablet by mouth dailyDC to SNF      fluticasone (FLONASE) 50 MCG/ACT nasal spray 1 spray by Each Nostril route daily as needed for Rhinitis, Disp-16 g, R-0NO PRINT      ferrous sulfate (IRON 325) 325 (65 Fe) MG tablet Take 325 mg by mouth daily (with breakfast)Historical Med      acetaminophen (TYLENOL) 500 MG tablet Take 500 mg by mouth every 6 hours as needed for PainHistorical Med      metoprolol tartrate (LOPRESSOR) 25 MG tablet Take 1 tablet by mouth 2 times daily, Disp-60 tablet, R-3Normal      docusate sodium (COLACE, DULCOLAX) 100 MG CAPS Take 100 mg by mouth 2 times daily, Disp-30 capsule, R-0Normal      clopidogrel (PLAVIX) 75 MG tablet Take 1 tablet by mouth daily, Disp-30 tablet, R-3Print       !! - Potential duplicate medications found. Please discuss with provider.           Time Spent on discharge: 45 minutes in the examination, evaluation, counseling and review of medications and discharge plan. Signed:    Vanessa Aguilera MD   1/15/2023      Thank you Carolina Patterson MD for the opportunity to be involved in this patient's care. If you have any questions or concerns, please feel free to contact me at [phone].

## 2023-01-16 ENCOUNTER — FOLLOWUP TELEPHONE ENCOUNTER (OUTPATIENT)
Dept: TELEMETRY | Age: 83
End: 2023-01-16

## 2023-01-16 NOTE — TELEPHONE ENCOUNTER
1st Attempt; No Answer- Left HIPAA compliant voicemail with Non-Urgent Heart Failure Resource Line number for call back.      Sandrita Dixon RN

## 2023-01-17 ENCOUNTER — FOLLOWUP TELEPHONE ENCOUNTER (OUTPATIENT)
Dept: TELEMETRY | Age: 83
End: 2023-01-17

## 2023-01-17 NOTE — TELEPHONE ENCOUNTER
3rd and final Attempt; No Answer- Left HIPAA compliant voicemail with Non-Urgent Heart Failure Resource Line number for call back.      Luciana Gorman RN

## 2023-01-24 ENCOUNTER — HOSPITAL ENCOUNTER (OUTPATIENT)
Age: 83
Setting detail: SPECIMEN
Discharge: HOME | End: 2023-01-24

## 2023-02-03 NOTE — PROGRESS NOTES
Physician Progress Note      PATIENT:               [address]  CSN #:                  [id number]  :                       1940  ADMIT DATE:       2023 11:35 AM  Arina Nunn DATE:        1/15/2023 1:00 PM  RESPONDING  PROVIDER #:        Kelly Aaron MD          QUERY TEXT:    Patient admitted with Acute respiratory failure/CHF/CP. DC summary and   PN-\"congestive heart failure acute on chronic and this is improved with IV   Lasix. \"   Query response - Noted documentation of \"This patient has   noncardiogenic acute pulmonary edema due to respiratory distress, acute   component of CHF ruled out\"  If possible, please document in progress notes and discharge summary if you   are evaluating and /or treating any of the following: The medical record reflects the following:  Risk Factors: COPD on home oxygen 3 L, CHF/systolic, CAD, chronic kidney   disease stage III, paroxysmal atrial fibrillation, diabetes mellitus type 2,   hypertension, GERD, hyperlipidemia. Clinical Indicators: PN--to discharge- \"Acute on Chronic Systolic CHF:   Noted to have pulmonary edema on presentation. Treated with IV Lasix x3   doses-ECHO showed LVEF 40% with mild global hypokinesis. This is a change from   her most recent testing. \"  Treatment: IV lasix, I&0's, daily wts, ECHO, recommend outpatient follow-up   with Cardiology pending resolution of GI issues    Thank-You, Ameena Salomon RN, BSN, CCDS  Options provided:  -- After study, acute on chronic systolic CHF POA, confirmed and treated  -- Other - I will add my own diagnosis  -- Disagree - Not applicable / Not valid  -- Disagree - Clinically unable to determine / Unknown  -- Refer to Clinical Documentation Reviewer    PROVIDER RESPONSE TEXT:    After study, acute on chronic systolic CHF POA -confirmed and treated.     Query created by: Zehra Sanon on 2023 10:29 AM      Electronically signed by:  Kelly Aaron MD 2/3/2023 3:25 PM

## 2023-05-07 ENCOUNTER — APPOINTMENT (OUTPATIENT)
Dept: GENERAL RADIOLOGY | Age: 83
DRG: 291 | End: 2023-05-07
Payer: MEDICARE

## 2023-05-07 ENCOUNTER — HOSPITAL ENCOUNTER (INPATIENT)
Age: 83
LOS: 3 days | Discharge: NURSING, REHAB OR LONG-TERM CARE | DRG: 291 | End: 2023-05-10
Attending: EMERGENCY MEDICINE | Admitting: INTERNAL MEDICINE
Payer: MEDICARE

## 2023-05-07 DIAGNOSIS — R06.00 DYSPNEA AND RESPIRATORY ABNORMALITIES: ICD-10-CM

## 2023-05-07 DIAGNOSIS — R06.89 DYSPNEA AND RESPIRATORY ABNORMALITIES: ICD-10-CM

## 2023-05-07 DIAGNOSIS — J44.1 COPD EXACERBATION (HCC): Primary | ICD-10-CM

## 2023-05-07 LAB
ALBUMIN SERPL-MCNC: 3.9 G/DL (ref 3.4–5)
ALBUMIN/GLOB SERPL: 1.3 {RATIO} (ref 1.1–2.2)
ALP SERPL-CCNC: 67 U/L (ref 40–129)
ALT SERPL-CCNC: 7 U/L (ref 10–40)
ANION GAP SERPL CALCULATED.3IONS-SCNC: 9 MMOL/L (ref 3–16)
AST SERPL-CCNC: 12 U/L (ref 15–37)
BASE EXCESS BLDV CALC-SCNC: 0.3 MMOL/L (ref -3–3)
BILIRUB SERPL-MCNC: <0.2 MG/DL (ref 0–1)
BUN SERPL-MCNC: 18 MG/DL (ref 7–20)
CALCIUM SERPL-MCNC: 10 MG/DL (ref 8.3–10.6)
CHLORIDE SERPL-SCNC: 105 MMOL/L (ref 99–110)
CO2 BLDV-SCNC: 28 MMOL/L
CO2 SERPL-SCNC: 28 MMOL/L (ref 21–32)
COHGB MFR BLDV: 3.6 % (ref 0–1.5)
CREAT SERPL-MCNC: 1.1 MG/DL (ref 0.6–1.2)
DEPRECATED RDW RBC AUTO: 14.8 % (ref 12.4–15.4)
GFR SERPLBLD CREATININE-BSD FMLA CKD-EPI: 50 ML/MIN/{1.73_M2}
GLUCOSE SERPL-MCNC: 135 MG/DL (ref 70–99)
HCO3 BLDV-SCNC: 26.3 MMOL/L (ref 23–29)
HCT VFR BLD AUTO: 38.4 % (ref 36–48)
HGB BLD-MCNC: 12.4 G/DL (ref 12–16)
MCH RBC QN AUTO: 30 PG (ref 26–34)
MCHC RBC AUTO-ENTMCNC: 32.2 G/DL (ref 31–36)
MCV RBC AUTO: 93.4 FL (ref 80–100)
METHGB MFR BLDV: 0.3 %
NT-PROBNP SERPL-MCNC: 2496 PG/ML (ref 0–449)
O2 CT VFR BLDV CALC: 11 VOL %
O2 THERAPY: ABNORMAL
PCO2 BLDV: 47.7 MMHG (ref 40–50)
PH BLDV: 7.36 [PH] (ref 7.35–7.45)
PLATELET # BLD AUTO: 218 K/UL (ref 135–450)
PMV BLD AUTO: 7.6 FL (ref 5–10.5)
PO2 BLDV: 31.3 MMHG (ref 25–40)
POTASSIUM SERPL-SCNC: 4.5 MMOL/L (ref 3.5–5.1)
PROT SERPL-MCNC: 7 G/DL (ref 6.4–8.2)
RBC # BLD AUTO: 4.11 M/UL (ref 4–5.2)
SAO2 % BLDV: 57 %
SODIUM SERPL-SCNC: 142 MMOL/L (ref 136–145)
TROPONIN, HIGH SENSITIVITY: 43 NG/L (ref 0–14)
TROPONIN, HIGH SENSITIVITY: 51 NG/L (ref 0–14)
WBC # BLD AUTO: 11.6 K/UL (ref 4–11)

## 2023-05-07 PROCEDURE — 93005 ELECTROCARDIOGRAM TRACING: CPT | Performed by: EMERGENCY MEDICINE

## 2023-05-07 PROCEDURE — 6360000002 HC RX W HCPCS: Performed by: EMERGENCY MEDICINE

## 2023-05-07 PROCEDURE — 96375 TX/PRO/DX INJ NEW DRUG ADDON: CPT

## 2023-05-07 PROCEDURE — 84484 ASSAY OF TROPONIN QUANT: CPT

## 2023-05-07 PROCEDURE — 83880 ASSAY OF NATRIURETIC PEPTIDE: CPT

## 2023-05-07 PROCEDURE — 6370000000 HC RX 637 (ALT 250 FOR IP): Performed by: EMERGENCY MEDICINE

## 2023-05-07 PROCEDURE — 96374 THER/PROPH/DIAG INJ IV PUSH: CPT

## 2023-05-07 PROCEDURE — 6370000000 HC RX 637 (ALT 250 FOR IP): Performed by: HOSPITALIST

## 2023-05-07 PROCEDURE — 82803 BLOOD GASES ANY COMBINATION: CPT

## 2023-05-07 PROCEDURE — 6360000002 HC RX W HCPCS: Performed by: HOSPITALIST

## 2023-05-07 PROCEDURE — 71045 X-RAY EXAM CHEST 1 VIEW: CPT

## 2023-05-07 PROCEDURE — 99285 EMERGENCY DEPT VISIT HI MDM: CPT

## 2023-05-07 PROCEDURE — 2580000003 HC RX 258: Performed by: EMERGENCY MEDICINE

## 2023-05-07 PROCEDURE — 2580000003 HC RX 258: Performed by: HOSPITALIST

## 2023-05-07 PROCEDURE — 36415 COLL VENOUS BLD VENIPUNCTURE: CPT

## 2023-05-07 PROCEDURE — 2060000000 HC ICU INTERMEDIATE R&B

## 2023-05-07 PROCEDURE — 80053 COMPREHEN METABOLIC PANEL: CPT

## 2023-05-07 PROCEDURE — 85027 COMPLETE CBC AUTOMATED: CPT

## 2023-05-07 RX ORDER — LANOLIN ALCOHOL/MO/W.PET/CERES
400 CREAM (GRAM) TOPICAL DAILY
Status: DISCONTINUED | OUTPATIENT
Start: 2023-05-08 | End: 2023-05-10 | Stop reason: HOSPADM

## 2023-05-07 RX ORDER — SUCRALFATE 1 G/1
1 TABLET ORAL EVERY 12 HOURS SCHEDULED
Status: DISCONTINUED | OUTPATIENT
Start: 2023-05-07 | End: 2023-05-10 | Stop reason: HOSPADM

## 2023-05-07 RX ORDER — ONDANSETRON 4 MG/1
4 TABLET, ORALLY DISINTEGRATING ORAL EVERY 8 HOURS PRN
Status: DISCONTINUED | OUTPATIENT
Start: 2023-05-07 | End: 2023-05-10 | Stop reason: HOSPADM

## 2023-05-07 RX ORDER — ACETAMINOPHEN 650 MG/1
650 SUPPOSITORY RECTAL EVERY 6 HOURS PRN
Status: DISCONTINUED | OUTPATIENT
Start: 2023-05-07 | End: 2023-05-10 | Stop reason: HOSPADM

## 2023-05-07 RX ORDER — IPRATROPIUM BROMIDE AND ALBUTEROL SULFATE 2.5; .5 MG/3ML; MG/3ML
1 SOLUTION RESPIRATORY (INHALATION) ONCE
Status: COMPLETED | OUTPATIENT
Start: 2023-05-07 | End: 2023-05-07

## 2023-05-07 RX ORDER — SODIUM CHLORIDE 0.9 % (FLUSH) 0.9 %
5-40 SYRINGE (ML) INJECTION EVERY 12 HOURS SCHEDULED
Status: DISCONTINUED | OUTPATIENT
Start: 2023-05-07 | End: 2023-05-10 | Stop reason: HOSPADM

## 2023-05-07 RX ORDER — SODIUM CHLORIDE 0.9 % (FLUSH) 0.9 %
5-40 SYRINGE (ML) INJECTION PRN
Status: DISCONTINUED | OUTPATIENT
Start: 2023-05-07 | End: 2023-05-10 | Stop reason: HOSPADM

## 2023-05-07 RX ORDER — ONDANSETRON 2 MG/ML
4 INJECTION INTRAMUSCULAR; INTRAVENOUS EVERY 6 HOURS PRN
Status: DISCONTINUED | OUTPATIENT
Start: 2023-05-07 | End: 2023-05-10 | Stop reason: HOSPADM

## 2023-05-07 RX ORDER — FUROSEMIDE 10 MG/ML
40 INJECTION INTRAMUSCULAR; INTRAVENOUS ONCE
Status: COMPLETED | OUTPATIENT
Start: 2023-05-07 | End: 2023-05-07

## 2023-05-07 RX ORDER — BENZONATATE 100 MG/1
100 CAPSULE ORAL 3 TIMES DAILY PRN
Status: DISCONTINUED | OUTPATIENT
Start: 2023-05-07 | End: 2023-05-10 | Stop reason: HOSPADM

## 2023-05-07 RX ORDER — BUSPIRONE HYDROCHLORIDE 5 MG/1
5 TABLET ORAL 3 TIMES DAILY
Status: DISCONTINUED | OUTPATIENT
Start: 2023-05-07 | End: 2023-05-10 | Stop reason: HOSPADM

## 2023-05-07 RX ORDER — BUDESONIDE AND FORMOTEROL FUMARATE DIHYDRATE 160; 4.5 UG/1; UG/1
2 AEROSOL RESPIRATORY (INHALATION) 2 TIMES DAILY
Status: DISCONTINUED | OUTPATIENT
Start: 2023-05-07 | End: 2023-05-10 | Stop reason: HOSPADM

## 2023-05-07 RX ORDER — FLUTICASONE PROPIONATE 50 MCG
1 SPRAY, SUSPENSION (ML) NASAL DAILY PRN
Status: DISCONTINUED | OUTPATIENT
Start: 2023-05-07 | End: 2023-05-10 | Stop reason: HOSPADM

## 2023-05-07 RX ORDER — IPRATROPIUM BROMIDE AND ALBUTEROL SULFATE 2.5; .5 MG/3ML; MG/3ML
1 SOLUTION RESPIRATORY (INHALATION)
Status: DISCONTINUED | OUTPATIENT
Start: 2023-05-08 | End: 2023-05-10 | Stop reason: HOSPADM

## 2023-05-07 RX ORDER — POLYETHYLENE GLYCOL 3350 17 G/17G
17 POWDER, FOR SOLUTION ORAL DAILY PRN
Status: DISCONTINUED | OUTPATIENT
Start: 2023-05-07 | End: 2023-05-10 | Stop reason: HOSPADM

## 2023-05-07 RX ORDER — FUROSEMIDE 10 MG/ML
20 INJECTION INTRAMUSCULAR; INTRAVENOUS 2 TIMES DAILY
Status: DISCONTINUED | OUTPATIENT
Start: 2023-05-08 | End: 2023-05-08

## 2023-05-07 RX ORDER — PREDNISONE 20 MG/1
40 TABLET ORAL DAILY
Status: DISCONTINUED | OUTPATIENT
Start: 2023-05-10 | End: 2023-05-10 | Stop reason: HOSPADM

## 2023-05-07 RX ORDER — ENOXAPARIN SODIUM 100 MG/ML
40 INJECTION SUBCUTANEOUS DAILY
Status: DISCONTINUED | OUTPATIENT
Start: 2023-05-08 | End: 2023-05-07 | Stop reason: ALTCHOICE

## 2023-05-07 RX ORDER — ACETAMINOPHEN 325 MG/1
650 TABLET ORAL EVERY 6 HOURS PRN
Status: DISCONTINUED | OUTPATIENT
Start: 2023-05-07 | End: 2023-05-10 | Stop reason: HOSPADM

## 2023-05-07 RX ORDER — SODIUM CHLORIDE 9 MG/ML
INJECTION, SOLUTION INTRAVENOUS PRN
Status: DISCONTINUED | OUTPATIENT
Start: 2023-05-07 | End: 2023-05-10 | Stop reason: HOSPADM

## 2023-05-07 RX ORDER — PANTOPRAZOLE SODIUM 40 MG/1
40 TABLET, DELAYED RELEASE ORAL
Status: DISCONTINUED | OUTPATIENT
Start: 2023-05-08 | End: 2023-05-10 | Stop reason: HOSPADM

## 2023-05-07 RX ORDER — CLOPIDOGREL BISULFATE 75 MG/1
75 TABLET ORAL DAILY
Status: DISCONTINUED | OUTPATIENT
Start: 2023-05-08 | End: 2023-05-10 | Stop reason: HOSPADM

## 2023-05-07 RX ORDER — ATORVASTATIN CALCIUM 40 MG/1
40 TABLET, FILM COATED ORAL NIGHTLY
Status: DISCONTINUED | OUTPATIENT
Start: 2023-05-07 | End: 2023-05-10 | Stop reason: HOSPADM

## 2023-05-07 RX ORDER — ALBUTEROL SULFATE 2.5 MG/3ML
2.5 SOLUTION RESPIRATORY (INHALATION)
Status: DISCONTINUED | OUTPATIENT
Start: 2023-05-07 | End: 2023-05-10 | Stop reason: HOSPADM

## 2023-05-07 RX ORDER — GUAIFENESIN 600 MG/1
600 TABLET, EXTENDED RELEASE ORAL 2 TIMES DAILY
Status: DISCONTINUED | OUTPATIENT
Start: 2023-05-07 | End: 2023-05-10 | Stop reason: HOSPADM

## 2023-05-07 RX ADMIN — ATORVASTATIN CALCIUM 40 MG: 40 TABLET, FILM COATED ORAL at 23:26

## 2023-05-07 RX ADMIN — BUSPIRONE HYDROCHLORIDE 5 MG: 5 TABLET ORAL at 23:25

## 2023-05-07 RX ADMIN — METHYLPREDNISOLONE SODIUM SUCCINATE 40 MG: 40 INJECTION INTRAMUSCULAR; INTRAVENOUS at 23:26

## 2023-05-07 RX ADMIN — GUAIFENESIN 600 MG: 600 TABLET, EXTENDED RELEASE ORAL at 23:25

## 2023-05-07 RX ADMIN — SUCRALFATE 1 G: 1 TABLET ORAL at 23:25

## 2023-05-07 RX ADMIN — IPRATROPIUM BROMIDE AND ALBUTEROL SULFATE 1 AMPULE: .5; 2.5 SOLUTION RESPIRATORY (INHALATION) at 18:40

## 2023-05-07 RX ADMIN — FUROSEMIDE 40 MG: 10 INJECTION, SOLUTION INTRAMUSCULAR; INTRAVENOUS at 18:45

## 2023-05-07 RX ADMIN — APIXABAN 2.5 MG: 5 TABLET, FILM COATED ORAL at 23:25

## 2023-05-07 RX ADMIN — Medication 10 ML: at 23:36

## 2023-05-07 RX ADMIN — METOPROLOL TARTRATE 25 MG: 25 TABLET, FILM COATED ORAL at 23:25

## 2023-05-07 RX ADMIN — WATER 125 MG: 1 INJECTION INTRAMUSCULAR; INTRAVENOUS; SUBCUTANEOUS at 18:40

## 2023-05-07 ASSESSMENT — PAIN DESCRIPTION - ORIENTATION: ORIENTATION: LEFT

## 2023-05-07 ASSESSMENT — PAIN DESCRIPTION - LOCATION: LOCATION: LEG

## 2023-05-07 ASSESSMENT — LIFESTYLE VARIABLES
HOW MANY STANDARD DRINKS CONTAINING ALCOHOL DO YOU HAVE ON A TYPICAL DAY: PATIENT DOES NOT DRINK
HOW OFTEN DO YOU HAVE A DRINK CONTAINING ALCOHOL: NEVER

## 2023-05-07 ASSESSMENT — PAIN - FUNCTIONAL ASSESSMENT: PAIN_FUNCTIONAL_ASSESSMENT: 0-10

## 2023-05-07 ASSESSMENT — PAIN SCALES - GENERAL: PAINLEVEL_OUTOF10: 3

## 2023-05-08 PROBLEM — I50.21 ACUTE SYSTOLIC CHF (CONGESTIVE HEART FAILURE) (HCC): Status: ACTIVE | Noted: 2023-05-08

## 2023-05-08 PROBLEM — J96.11 CHRONIC HYPOXEMIC RESPIRATORY FAILURE (HCC): Status: ACTIVE | Noted: 2023-05-08

## 2023-05-08 PROBLEM — I42.9 MYOCARDIOPATHY (HCC): Status: ACTIVE | Noted: 2023-05-08

## 2023-05-08 PROBLEM — I48.0 PAF (PAROXYSMAL ATRIAL FIBRILLATION) (HCC): Status: ACTIVE | Noted: 2023-05-08

## 2023-05-08 PROBLEM — I50.23 ACUTE ON CHRONIC SYSTOLIC CONGESTIVE HEART FAILURE (HCC): Status: ACTIVE | Noted: 2023-05-08

## 2023-05-08 LAB
ANION GAP SERPL CALCULATED.3IONS-SCNC: 10 MMOL/L (ref 3–16)
BASOPHILS # BLD: 0 K/UL (ref 0–0.2)
BASOPHILS NFR BLD: 0 %
BUN SERPL-MCNC: 27 MG/DL (ref 7–20)
CALCIUM SERPL-MCNC: 9.8 MG/DL (ref 8.3–10.6)
CHLORIDE SERPL-SCNC: 104 MMOL/L (ref 99–110)
CO2 SERPL-SCNC: 29 MMOL/L (ref 21–32)
CREAT SERPL-MCNC: 1.6 MG/DL (ref 0.6–1.2)
DEPRECATED RDW RBC AUTO: 14.3 % (ref 12.4–15.4)
EKG ATRIAL RATE: 80 BPM
EKG DIAGNOSIS: NORMAL
EKG P AXIS: 73 DEGREES
EKG P-R INTERVAL: 150 MS
EKG Q-T INTERVAL: 382 MS
EKG QRS DURATION: 82 MS
EKG QTC CALCULATION (BAZETT): 440 MS
EKG R AXIS: -66 DEGREES
EKG T AXIS: 24 DEGREES
EKG VENTRICULAR RATE: 80 BPM
EOSINOPHIL # BLD: 0 K/UL (ref 0–0.6)
EOSINOPHIL NFR BLD: 0 %
GFR SERPLBLD CREATININE-BSD FMLA CKD-EPI: 32 ML/MIN/{1.73_M2}
GLUCOSE SERPL-MCNC: 199 MG/DL (ref 70–99)
HCT VFR BLD AUTO: 37.8 % (ref 36–48)
HGB BLD-MCNC: 12.4 G/DL (ref 12–16)
LV EF: 53 %
LVEF MODALITY: NORMAL
LYMPHOCYTES # BLD: 0.6 K/UL (ref 1–5.1)
LYMPHOCYTES NFR BLD: 7.1 %
MCH RBC QN AUTO: 30.2 PG (ref 26–34)
MCHC RBC AUTO-ENTMCNC: 32.9 G/DL (ref 31–36)
MCV RBC AUTO: 91.8 FL (ref 80–100)
MONOCYTES # BLD: 0.1 K/UL (ref 0–1.3)
MONOCYTES NFR BLD: 0.7 %
NEUTROPHILS # BLD: 7.4 K/UL (ref 1.7–7.7)
NEUTROPHILS NFR BLD: 92.2 %
PLATELET # BLD AUTO: 204 K/UL (ref 135–450)
PMV BLD AUTO: 8.3 FL (ref 5–10.5)
POTASSIUM SERPL-SCNC: 4.3 MMOL/L (ref 3.5–5.1)
RBC # BLD AUTO: 4.12 M/UL (ref 4–5.2)
SODIUM SERPL-SCNC: 143 MMOL/L (ref 136–145)
WBC # BLD AUTO: 8.1 K/UL (ref 4–11)

## 2023-05-08 PROCEDURE — 99233 SBSQ HOSP IP/OBS HIGH 50: CPT | Performed by: INTERNAL MEDICINE

## 2023-05-08 PROCEDURE — 80048 BASIC METABOLIC PNL TOTAL CA: CPT

## 2023-05-08 PROCEDURE — 6360000002 HC RX W HCPCS: Performed by: HOSPITALIST

## 2023-05-08 PROCEDURE — 99223 1ST HOSP IP/OBS HIGH 75: CPT | Performed by: INTERNAL MEDICINE

## 2023-05-08 PROCEDURE — 36415 COLL VENOUS BLD VENIPUNCTURE: CPT

## 2023-05-08 PROCEDURE — 94761 N-INVAS EAR/PLS OXIMETRY MLT: CPT

## 2023-05-08 PROCEDURE — 2580000003 HC RX 258: Performed by: HOSPITALIST

## 2023-05-08 PROCEDURE — 93325 DOPPLER ECHO COLOR FLOW MAPG: CPT

## 2023-05-08 PROCEDURE — 2060000000 HC ICU INTERMEDIATE R&B

## 2023-05-08 PROCEDURE — 94660 CPAP INITIATION&MGMT: CPT

## 2023-05-08 PROCEDURE — 93010 ELECTROCARDIOGRAM REPORT: CPT | Performed by: INTERNAL MEDICINE

## 2023-05-08 PROCEDURE — 6370000000 HC RX 637 (ALT 250 FOR IP): Performed by: HOSPITALIST

## 2023-05-08 PROCEDURE — 93308 TTE F-UP OR LMTD: CPT

## 2023-05-08 PROCEDURE — 85025 COMPLETE CBC W/AUTO DIFF WBC: CPT

## 2023-05-08 PROCEDURE — 2700000000 HC OXYGEN THERAPY PER DAY

## 2023-05-08 PROCEDURE — 94640 AIRWAY INHALATION TREATMENT: CPT

## 2023-05-08 RX ADMIN — BUSPIRONE HYDROCHLORIDE 5 MG: 5 TABLET ORAL at 20:47

## 2023-05-08 RX ADMIN — CLOPIDOGREL BISULFATE 75 MG: 75 TABLET ORAL at 09:00

## 2023-05-08 RX ADMIN — GUAIFENESIN 600 MG: 600 TABLET, EXTENDED RELEASE ORAL at 20:47

## 2023-05-08 RX ADMIN — METOPROLOL TARTRATE 25 MG: 25 TABLET, FILM COATED ORAL at 20:47

## 2023-05-08 RX ADMIN — TIOTROPIUM BROMIDE INHALATION SPRAY 2 PUFF: 3.12 SPRAY, METERED RESPIRATORY (INHALATION) at 07:40

## 2023-05-08 RX ADMIN — METHYLPREDNISOLONE SODIUM SUCCINATE 40 MG: 40 INJECTION INTRAMUSCULAR; INTRAVENOUS at 06:38

## 2023-05-08 RX ADMIN — BUSPIRONE HYDROCHLORIDE 5 MG: 5 TABLET ORAL at 09:01

## 2023-05-08 RX ADMIN — SERTRALINE HYDROCHLORIDE 150 MG: 50 TABLET ORAL at 09:01

## 2023-05-08 RX ADMIN — METOPROLOL TARTRATE 25 MG: 25 TABLET, FILM COATED ORAL at 09:00

## 2023-05-08 RX ADMIN — METHYLPREDNISOLONE SODIUM SUCCINATE 40 MG: 40 INJECTION INTRAMUSCULAR; INTRAVENOUS at 12:38

## 2023-05-08 RX ADMIN — IPRATROPIUM BROMIDE AND ALBUTEROL SULFATE 1 AMPULE: .5; 2.5 SOLUTION RESPIRATORY (INHALATION) at 15:25

## 2023-05-08 RX ADMIN — APIXABAN 2.5 MG: 5 TABLET, FILM COATED ORAL at 09:01

## 2023-05-08 RX ADMIN — IPRATROPIUM BROMIDE AND ALBUTEROL SULFATE 1 AMPULE: .5; 2.5 SOLUTION RESPIRATORY (INHALATION) at 20:32

## 2023-05-08 RX ADMIN — SUCRALFATE 1 G: 1 TABLET ORAL at 09:00

## 2023-05-08 RX ADMIN — APIXABAN 2.5 MG: 5 TABLET, FILM COATED ORAL at 20:47

## 2023-05-08 RX ADMIN — METHYLPREDNISOLONE SODIUM SUCCINATE 40 MG: 40 INJECTION INTRAMUSCULAR; INTRAVENOUS at 20:15

## 2023-05-08 RX ADMIN — Medication 10 ML: at 09:02

## 2023-05-08 RX ADMIN — SUCRALFATE 1 G: 1 TABLET ORAL at 20:47

## 2023-05-08 RX ADMIN — Medication 400 MG: at 09:01

## 2023-05-08 RX ADMIN — IPRATROPIUM BROMIDE AND ALBUTEROL SULFATE 1 AMPULE: .5; 2.5 SOLUTION RESPIRATORY (INHALATION) at 11:15

## 2023-05-08 RX ADMIN — ATORVASTATIN CALCIUM 40 MG: 40 TABLET, FILM COATED ORAL at 20:47

## 2023-05-08 RX ADMIN — PANTOPRAZOLE SODIUM 40 MG: 40 TABLET, DELAYED RELEASE ORAL at 14:02

## 2023-05-08 RX ADMIN — Medication 10 ML: at 20:48

## 2023-05-08 RX ADMIN — Medication 2 PUFF: at 20:32

## 2023-05-08 RX ADMIN — BUSPIRONE HYDROCHLORIDE 5 MG: 5 TABLET ORAL at 14:02

## 2023-05-08 RX ADMIN — GUAIFENESIN 600 MG: 600 TABLET, EXTENDED RELEASE ORAL at 09:01

## 2023-05-08 RX ADMIN — Medication 2 PUFF: at 07:40

## 2023-05-08 RX ADMIN — ONDANSETRON 4 MG: 2 INJECTION INTRAMUSCULAR; INTRAVENOUS at 12:39

## 2023-05-08 RX ADMIN — IPRATROPIUM BROMIDE AND ALBUTEROL SULFATE 1 AMPULE: .5; 2.5 SOLUTION RESPIRATORY (INHALATION) at 07:38

## 2023-05-08 NOTE — DISCHARGE INSTRUCTIONS
Heart Failure Resources:    Heart Failure Interactive Workbook:   Go to [email or web address]-heartfailure for a Free Heart Failure Interactive Workbook provided by [employer]. This interactive workbook will provide information on Healthier Living with Heart Failure. Please copy and paste link into search bar. Use your mouse to scroll through the pages. HF [place] whitney:   Heart Failure Free smart phone whitney available for iPhone and Android download. Use your phone to track sodium intake, fluid intake, symptoms, and weight. Low Sodium Diet:  Go to www. [employer] website for [employer] which is Low Sodium! [employer] is a dialysis company, but this website offers free seasonal cookbooks. Each quarter, they will release 25-30 new recipes with a breakdown of calories, sodium, and glucose. Recipes:   Go to www.[employer]/recipes website for free recipes. Home Exercise Program:     Identification of Green/yellow/Red zones: You should be able to identify when you feel good (green zone), if you have 1-2 symptoms of HF (yellow zone), or if you are in need of medical attention (red zone). In your CHF education folder you were provided a stop light tool to outline this information. We want to you to rate your exertion levels: Our therapy team has discussed means of identification with you such as the \"Michelle scale. \"  The Michelle rating scale ranges from 6 to 20, where 6 means \"no exertion at all\" and 20 means \"maximal exertion. \" The goal is to use this to gauge how much effort it is taking for you to do your normal daily tasks. You should be able to recognize when too much exertion is being expended. Elements of Energy Conservation:   Prioritize/Plan: Decide what needs to be done today, and what can wait for a later date, write to do lists, Plan ahead to avoid extra trips, Gather supplies and equipment needed before starting an activity.    Position: Avoid tiring and awkward posture

## 2023-05-08 NOTE — H&P
Minutes    Critical Care Provided     Minutes non procedure based      Comments    x Reviewed previous records   >50% of visit spent in counseling and coordination of care x Discussion with patient and/or family and questions answered       Given the patient's current clinical presentation, I have a high level of concern for decompensation if discharged from the ED. Complex decision making was performed which includes reviewing the patient's available past medical records, laboratory results, and Xray films. I have also directly communicated my plan and discussed this case with the involved ED physician.     ____________________________________________________________________  David Bey MD    Procedures: see electronic medical records for all procedures/Xrays and details which were not copied into this note but were reviewed prior to creation of Plan.     LAB DATA REVIEWED:    Recent Results (from the past 24 hour(s))   CBC    Collection Time: 05/07/23  4:43 PM   Result Value Ref Range    WBC 11.6 (H) 4.0 - 11.0 K/uL    RBC 4.11 4.00 - 5.20 M/uL    Hemoglobin 12.4 12.0 - 16.0 g/dL    Hematocrit 38.4 36.0 - 48.0 %    MCV 93.4 80.0 - 100.0 fL    MCH 30.0 26.0 - 34.0 pg    MCHC 32.2 31.0 - 36.0 g/dL    RDW 14.8 12.4 - 15.4 %    Platelets 130 920 - 625 K/uL    MPV 7.6 5.0 - 10.5 fL   CMP w/ Reflex to MG    Collection Time: 05/07/23  4:43 PM   Result Value Ref Range    Sodium 142 136 - 145 mmol/L    Potassium reflex Magnesium 4.5 3.5 - 5.1 mmol/L    Chloride 105 99 - 110 mmol/L    CO2 28 21 - 32 mmol/L    Anion Gap 9 3 - 16    Glucose 135 (H) 70 - 99 mg/dL    BUN 18 7 - 20 mg/dL    Creatinine 1.1 0.6 - 1.2 mg/dL    Est, Glom Filt Rate 50 (A) >60    Calcium 10.0 8.3 - 10.6 mg/dL    Total Protein 7.0 6.4 - 8.2 g/dL    Albumin 3.9 3.4 - 5.0 g/dL    Albumin/Globulin Ratio 1.3 1.1 - 2.2    Total Bilirubin <0.2 0.0 - 1.0 mg/dL    Alkaline Phosphatase 67 40 - 129 U/L    ALT 7 (L) 10 - 40 U/L    AST 12 (L) 15 - 37 U/L   Troponin

## 2023-05-08 NOTE — CONSULTS
[facility]   HEART FAILURE PROGRAM      NAME:  Lamonte Smith  AGE: 81 y.o. GENDER: female  : 1940  TODAY'S DATE:  2023    Subjective:     VISIT TYPE: Evaluation / Consult    ADMIT DATE: 2023    PAST MEDICAL HISTORY:      Diagnosis Date    NADJA (acute kidney injury) ([facility]     Asthma     CAD (coronary artery disease)     CHF (congestive heart failure) ([facility])     unsure    Chronic anxiety     COPD (chronic obstructive pulmonary disease) ([facility])     GERD (gastroesophageal reflux disease) 2021    Heart attack ([facility] 2019    History of blood transfusion     Hyperlipidemia     Hypertension     MRSA (methicillin resistant staph aureus) culture positive 2019    + resp cx    OA (osteoarthritis) 2014    Thyroid disease      HOME MEDICATIONS:  Prior to Admission medications    Medication Sig Start Date End Date Taking?  Authorizing Provider   busPIRone (BUSPAR) 5 MG tablet Take 1 tablet by mouth 3 times daily 1/15/23   Jaycob Long MD   pantoprazole (PROTONIX) 40 MG tablet Take 1 tablet by mouth 2 times daily (before meals) 1/15/23   Jaycob Long MD   sucralfate (CARAFATE) 1 GM tablet Take 1 tablet by mouth 4 times daily 1/15/23   Jaycob Long MD   apixaban (ELIQUIS) 2.5 MG TABS tablet Take 1 tablet by mouth 2 times daily 22   Donna Garces MD   atorvastatin (LIPITOR) 40 MG tablet Take 1 tablet by mouth nightly 22   Donna Garces MD   busPIRone (BUSPAR) 5 MG tablet Take 5 mg by mouth 3 times daily    Historical Provider, MD   Fluticasone-Umeclidin-Vilant (TRELEGY ELLIPTA) 200-62.5-25 MCG/INH AEPB Inhale 1 puff into the lungs daily    Historical Provider, MD   albuterol (PROVENTIL) (2.5 MG/3ML) 0.083% nebulizer solution Take 2.5 mg by nebulization every 4 hours as needed for Wheezing    Historical Provider, MD   sertraline (ZOLOFT) 50 MG tablet Take 50 mg by mouth daily    Historical Provider, MD   predniSONE (DELTASONE) 5 MG tablet Take 5 mg

## 2023-05-08 NOTE — ACP (ADVANCE CARE PLANNING)
Advance Care Planning     General Advance Care Planning (ACP) Conversation    Date of Conversation: 5/7/2023  Conducted with: Patient with Decision Making Capacity    Healthcare Decision Maker:    Primary Decision Maker: Clovis Youssef - Edgar - [phone]    Secondary Decision Maker: Radha Durant - Niece/Nephew - [phone]  Click here to complete Healthcare Decision Makers including selection of the Healthcare Decision Maker Relationship (ie \"Primary\"). Today we documented Decision Maker(s) consistent with Legal Next of Kin hierarchy.     Content/Action Overview:  DECLINED ACP Conversation - will revisit periodically  Reviewed DNR/DNI and patient elects Full Code (Attempt Resuscitation)    Length of Voluntary ACP Conversation in minutes:    <16 minutes (Non-Billable)  Neha Davalos RN

## 2023-05-08 NOTE — ED PROVIDER NOTES
[address] ED  EMERGENCY DEPARTMENT ENCOUNTER        Patient Name: Ambar Michael  MRN: [id number]  [place] 1940  Date of evaluation: 5/7/2023  Provider: Rosita Benítez MD  PCP: Prabhjot Johnson MD  Note Started: 9:00 PM EDT 5/7/23    CHIEF COMPLAINT       Laceration (To LLE- cut by daughters fingernail, bleeding continues) and Shortness of Breath (Increased SOB, wears home O2, history anxiety)      HISTORY OF PRESENT ILLNESS: 1 or more Elements     History from : Patient    Limitations to history : None    Ambar Michael is a 81 y.o. female who presents for evaluation of shortness of breath, left lower extremity laceration. Patient states that she has history of COPD. She typically wears 2 L O2. She states she has had increased dyspnea on exertion, shortness of breath. No new swelling of the lower extremities. She also was scraped of her left lower extremity and had continued bleeding. She was brought in by EMS. She states that her breathing has been getting worse. She denies any chest pain at this time. No fevers. Nursing Notes were all reviewed and agreed with or any disagreements were addressed in the HPI. REVIEW OF SYSTEMS :      Review of Systems    Positives and Pertinent negatives as per HPI.      SURGICAL HISTORY     Past Surgical History:   Procedure Laterality Date    BRONCHOSCOPY  09/08/2019    Dr. Ashley Mckeon w/ALIYA    CARDIAC CATHETERIZATION  09/05/2019    Dr. Jack Trevizo 2/24/2020    COLONOSCOPY DIAGNOSTIC performed by Stefano Woodard DO at [address] [facility] N/A 10/22/2021    COLONOSCOPY POLYPECTOMY SNARE/COLD BIOPSY performed by Nathaly Rogers MD at [address] [facility] N/A 9/19/2019    OFF PUMP CORONARY ARTERY BYPASS GRAFTING X2, INTERNAL MAMMARY ARTERY, SAPHENOUS VEIN GRAFT performed by Marianne Khan MD at [address] [facility] CATH  9/20/2021    IR

## 2023-05-08 NOTE — CONSULTS
CARDIOLOGY PROGRESS NOTE      Patient Name: Martha Guzman  Date of admission: 5/7/2023  4:29 PM  Admission Dx: Dyspnea and respiratory abnormalities [R06.00, R06.89]  COPD exacerbation ([address] [J44.1]  Reason for Consult:  Atrial fibrillation   Requesting Physician: Tennille Burrows MD  Primary Care physician: Barbara Solorzaon MD    Subjective:     Martha Guzman is a [de-identified] y.o. patient with PMH notable for COPD, CAD s/p 2V CABG 9/2019 (LIMA to LAD, SVG to Diagonal), hypertension, chronic kidney disease, paroxysmal atrial fibrillation, diabetes mellitus type 2, and hyperlipidemia. Most recent Cath 9/4/2019 noted EF=35-40%; CAD involving ostial left main disease and mid LAD disease. Most recent Echo 8/31/2021 EF=55-60%. Most recent Camryn-Myoview 9/1/2021 noted EF=55% negative for ischemia. Pt was evaluated with my partner Dr. Tina Pemberton 3/31 for elevated heart rate and troponins. Noted hypotensive on arrival. Inc oxygen requirement from [facility]. Malaise, dyspnea, palpitations endorsed. Admit EKG SR with SA; RSR' in V1 ; left axis deviation; LAFB; inferior infarct (no change 10/17). Second EKG with narrow QRS tachycardia 171bpm and 3rd EKG  with PAC's; BNP of 17,516; Gilberto 0.16 and 0.12, 0.33, 0.23; Cr 2.1; Mg+ 1.5, 2.8; COVID and flu are negative. Blood cultures were sent. Urine positive nitrites large leukocytes with  WBCs +3 bacteria. Note CXR suggested pulmonary edema or infection. Patient was last evaluated by me on inpatient basis 4/2022 for shortness of breath, acute on chronic hypoxic respiratory failure and atrial fibrillation, sepsis and acute kidney injury. Patient was noted intubated at the time. She was continued on Plavix/beta-blocker. Recommendations given to transition heparin back to oral anticoagulant once recovered. Does not appear to have followed up with cardiology in the interim.     Patient presents this admission with chief complaint of dyspnea with exertion

## 2023-05-08 NOTE — CONSULTS
Pulmonary & Critical Care Consultation Note    Patient is being seen at the request of Michael Coy MD   for a consultation for COPD exacerbation      HISTORY OF PRESENT ILLNESS:   81years old with history of CAD, COPD presented with shortness of breath for few days. Worse with exertion and better with resting. Associated with chest congestion. Severe on Saturday night. Cough with clear sputum. No hemoptysis. No smoking- quit 2 years ago.   Patient is compliant with inhaled bronchodilators, AVAPS and O2- 2.5L     PAST MEDICAL HISTORY:  Past Medical History:   Diagnosis Date    NADJA (acute kidney injury) ([facility]     Asthma     CAD (coronary artery disease)     CHF (congestive heart failure) ([facility])     unsure    Chronic anxiety     COPD (chronic obstructive pulmonary disease) (H[facility])     GERD (gastroesophageal reflux disease) 09/09/2021    Heart attack ([facility] 2019    History of blood transfusion     Hyperlipidemia     Hypertension     MRSA (methicillin resistant staph aureus) culture positive 09/22/2019    + resp cx    OA (osteoarthritis) 08/12/2014    Thyroid disease      PAST SURGICAL HISTORY:  Past Surgical History:   Procedure Laterality Date    BRONCHOSCOPY  09/08/2019    Dr. Orion Hernández - w/BAL    CARDIAC CATHETERIZATION  09/05/2019    Dr. Bhavya Echeverria N/A 2/24/2020    COLONOSCOPY DIAGNOSTIC performed by Velia Silver DO at [address] [facility] N/A 10/22/2021    COLONOSCOPY POLYPECTOMY SNARE/COLD BIOPSY performed by Juwan Castro MD at [address] [facility] N/A 9/19/2019    OFF PUMP CORONARY ARTERY BYPASS GRAFTING X2, INTERNAL MAMMARY ARTERY, SAPHENOUS VEIN GRAFT performed by Nury Nelson MD at [address] [facility] [address] CATH  9/20/2021    IR MIDLINE CATH 9/20/2021 [facility] PROC[facility]    MASTECTOMY, PARTIAL Left     SIGMOIDOSCOPY  02/27/2020    4 bands    SIGMOIDOSCOPY N/A 2/27/2020    FLEX SIG W/ BANDING SIGMOIDOSCOPY

## 2023-05-09 LAB
ANION GAP SERPL CALCULATED.3IONS-SCNC: 14 MMOL/L (ref 3–16)
BUN SERPL-MCNC: 48 MG/DL (ref 7–20)
CALCIUM SERPL-MCNC: 9.8 MG/DL (ref 8.3–10.6)
CHLORIDE SERPL-SCNC: 106 MMOL/L (ref 99–110)
CO2 SERPL-SCNC: 22 MMOL/L (ref 21–32)
CREAT SERPL-MCNC: 1.5 MG/DL (ref 0.6–1.2)
GFR SERPLBLD CREATININE-BSD FMLA CKD-EPI: 35 ML/MIN/{1.73_M2}
GLUCOSE SERPL-MCNC: 152 MG/DL (ref 70–99)
POTASSIUM SERPL-SCNC: 3.8 MMOL/L (ref 3.5–5.1)
SARS-COV-2 RDRP RESP QL NAA+PROBE: NOT DETECTED
SODIUM SERPL-SCNC: 142 MMOL/L (ref 136–145)

## 2023-05-09 PROCEDURE — 97535 SELF CARE MNGMENT TRAINING: CPT

## 2023-05-09 PROCEDURE — 97166 OT EVAL MOD COMPLEX 45 MIN: CPT

## 2023-05-09 PROCEDURE — 2580000003 HC RX 258: Performed by: HOSPITALIST

## 2023-05-09 PROCEDURE — 97530 THERAPEUTIC ACTIVITIES: CPT

## 2023-05-09 PROCEDURE — 87635 SARS-COV-2 COVID-19 AMP PRB: CPT

## 2023-05-09 PROCEDURE — 99232 SBSQ HOSP IP/OBS MODERATE 35: CPT | Performed by: INTERNAL MEDICINE

## 2023-05-09 PROCEDURE — 2700000000 HC OXYGEN THERAPY PER DAY

## 2023-05-09 PROCEDURE — 97162 PT EVAL MOD COMPLEX 30 MIN: CPT

## 2023-05-09 PROCEDURE — 94761 N-INVAS EAR/PLS OXIMETRY MLT: CPT

## 2023-05-09 PROCEDURE — 94640 AIRWAY INHALATION TREATMENT: CPT

## 2023-05-09 PROCEDURE — 94660 CPAP INITIATION&MGMT: CPT

## 2023-05-09 PROCEDURE — 6370000000 HC RX 637 (ALT 250 FOR IP): Performed by: HOSPITALIST

## 2023-05-09 PROCEDURE — 99233 SBSQ HOSP IP/OBS HIGH 50: CPT | Performed by: INTERNAL MEDICINE

## 2023-05-09 PROCEDURE — 36415 COLL VENOUS BLD VENIPUNCTURE: CPT

## 2023-05-09 PROCEDURE — 80048 BASIC METABOLIC PNL TOTAL CA: CPT

## 2023-05-09 PROCEDURE — 2060000000 HC ICU INTERMEDIATE R&B

## 2023-05-09 PROCEDURE — 6360000002 HC RX W HCPCS: Performed by: HOSPITALIST

## 2023-05-09 PROCEDURE — 97116 GAIT TRAINING THERAPY: CPT

## 2023-05-09 RX ADMIN — IPRATROPIUM BROMIDE AND ALBUTEROL SULFATE 1 AMPULE: .5; 2.5 SOLUTION RESPIRATORY (INHALATION) at 23:55

## 2023-05-09 RX ADMIN — ATORVASTATIN CALCIUM 40 MG: 40 TABLET, FILM COATED ORAL at 20:39

## 2023-05-09 RX ADMIN — METOPROLOL TARTRATE 25 MG: 25 TABLET, FILM COATED ORAL at 09:32

## 2023-05-09 RX ADMIN — APIXABAN 2.5 MG: 5 TABLET, FILM COATED ORAL at 09:42

## 2023-05-09 RX ADMIN — SUCRALFATE 1 G: 1 TABLET ORAL at 09:32

## 2023-05-09 RX ADMIN — PANTOPRAZOLE SODIUM 40 MG: 40 TABLET, DELAYED RELEASE ORAL at 16:30

## 2023-05-09 RX ADMIN — METHYLPREDNISOLONE SODIUM SUCCINATE 40 MG: 40 INJECTION INTRAMUSCULAR; INTRAVENOUS at 00:06

## 2023-05-09 RX ADMIN — APIXABAN 2.5 MG: 5 TABLET, FILM COATED ORAL at 20:39

## 2023-05-09 RX ADMIN — Medication 10 ML: at 20:39

## 2023-05-09 RX ADMIN — METHYLPREDNISOLONE SODIUM SUCCINATE 40 MG: 40 INJECTION INTRAMUSCULAR; INTRAVENOUS at 06:07

## 2023-05-09 RX ADMIN — IPRATROPIUM BROMIDE AND ALBUTEROL SULFATE 1 AMPULE: .5; 2.5 SOLUTION RESPIRATORY (INHALATION) at 20:22

## 2023-05-09 RX ADMIN — IPRATROPIUM BROMIDE AND ALBUTEROL SULFATE 1 AMPULE: .5; 2.5 SOLUTION RESPIRATORY (INHALATION) at 07:54

## 2023-05-09 RX ADMIN — Medication 10 ML: at 09:31

## 2023-05-09 RX ADMIN — SERTRALINE HYDROCHLORIDE 150 MG: 50 TABLET ORAL at 09:32

## 2023-05-09 RX ADMIN — GUAIFENESIN 600 MG: 600 TABLET, EXTENDED RELEASE ORAL at 20:39

## 2023-05-09 RX ADMIN — BUSPIRONE HYDROCHLORIDE 5 MG: 5 TABLET ORAL at 20:39

## 2023-05-09 RX ADMIN — ACETAMINOPHEN 650 MG: 325 TABLET ORAL at 00:06

## 2023-05-09 RX ADMIN — METHYLPREDNISOLONE SODIUM SUCCINATE 40 MG: 40 INJECTION INTRAMUSCULAR; INTRAVENOUS at 12:33

## 2023-05-09 RX ADMIN — SUCRALFATE 1 G: 1 TABLET ORAL at 20:39

## 2023-05-09 RX ADMIN — PANTOPRAZOLE SODIUM 40 MG: 40 TABLET, DELAYED RELEASE ORAL at 06:08

## 2023-05-09 RX ADMIN — METHYLPREDNISOLONE SODIUM SUCCINATE 40 MG: 40 INJECTION INTRAMUSCULAR; INTRAVENOUS at 16:29

## 2023-05-09 RX ADMIN — IPRATROPIUM BROMIDE AND ALBUTEROL SULFATE 1 AMPULE: .5; 2.5 SOLUTION RESPIRATORY (INHALATION) at 15:27

## 2023-05-09 RX ADMIN — METOPROLOL TARTRATE 25 MG: 25 TABLET, FILM COATED ORAL at 20:39

## 2023-05-09 RX ADMIN — IPRATROPIUM BROMIDE AND ALBUTEROL SULFATE 1 AMPULE: .5; 2.5 SOLUTION RESPIRATORY (INHALATION) at 11:17

## 2023-05-09 RX ADMIN — BUSPIRONE HYDROCHLORIDE 5 MG: 5 TABLET ORAL at 09:43

## 2023-05-09 RX ADMIN — BUSPIRONE HYDROCHLORIDE 5 MG: 5 TABLET ORAL at 14:58

## 2023-05-09 RX ADMIN — GUAIFENESIN 600 MG: 600 TABLET, EXTENDED RELEASE ORAL at 09:31

## 2023-05-09 RX ADMIN — Medication 2 PUFF: at 07:56

## 2023-05-09 RX ADMIN — TIOTROPIUM BROMIDE INHALATION SPRAY 2 PUFF: 3.12 SPRAY, METERED RESPIRATORY (INHALATION) at 07:54

## 2023-05-09 RX ADMIN — IPRATROPIUM BROMIDE AND ALBUTEROL SULFATE 1 AMPULE: .5; 2.5 SOLUTION RESPIRATORY (INHALATION) at 00:17

## 2023-05-09 RX ADMIN — CLOPIDOGREL BISULFATE 75 MG: 75 TABLET ORAL at 09:31

## 2023-05-09 RX ADMIN — Medication 400 MG: at 09:42

## 2023-05-09 RX ADMIN — Medication 2 PUFF: at 20:23

## 2023-05-09 ASSESSMENT — PAIN SCALES - GENERAL
PAINLEVEL_OUTOF10: 1
PAINLEVEL_OUTOF10: 4

## 2023-05-09 NOTE — FLOWSHEET NOTE
05/08/23 2035   Vital Signs   Temp 97 °F (36.1 °C)   Temp Source Axillary   Pulse 94   Respirations 18   /64   MAP (Calculated) 78   BP Location Right upper arm   BP Method Automatic   Patient Position High fowlers   Level of Consciousness 0   MEWS Score 1   Oxygen Therapy   SpO2 97 %   Pulse Oximeter Device Mode Continuous   O2 Device Nasal cannula   Skin Assessment Clean, dry, & intact   O2 Flow Rate (L/min) 3 L/min   Patient Observation   Observations SPO2  97%  on 3L NC. Pt resting in bed with call light and bedside table within reach. Shift assessment added. Bed alarm is set.

## 2023-05-09 NOTE — PLAN OF CARE
Problem: Discharge Planning  Goal: Discharge to home or other facility with appropriate resources  Outcome: Progressing     Problem: Pain  Goal: Verbalizes/displays adequate comfort level or baseline comfort level  Outcome: Progressing     Problem: Safety - Adult  Goal: Free from fall injury  Outcome: Progressing     Problem: ABCDS Injury Assessment  Goal: Absence of physical injury  Outcome: Progressing     Problem: Skin/Tissue Integrity  Goal: Absence of new skin breakdown  Description: 1. Monitor for areas of redness and/or skin breakdown  2. Assess vascular access sites hourly  3. Every 4-6 hours minimum:  Change oxygen saturation probe site  4. Every 4-6 hours:  If on nasal continuous positive airway pressure, respiratory therapy assess nares and determine need for appliance change or resting period. Outcome: Progressing     Problem: Chronic Conditions and Co-morbidities  Goal: Patient's chronic conditions and co-morbidity symptoms are monitored and maintained or improved  Outcome: Progressing  Note:   HEART FAILURE CARE PLAN:    Comorbidities Reviewed: Yes   Patient has a past medical history of NADJA (acute kidney injury) ([facility] Asthma, CAD (coronary artery disease), CHF (congestive heart failure) ([facility] Chronic anxiety, COPD (chronic obstructive pulmonary disease) ([facility] GERD (gastroesophageal reflux disease), Heart attack ([facility] History of blood transfusion, Hyperlipidemia, Hypertension, MRSA (methicillin resistant staph aureus) culture positive, OA (osteoarthritis), and Thyroid disease. ECHOCARDIOGRAM Reviewed: Yes   Patient's Ejection Fraction (EF) is 40%    Weights Reviewed:  Yes   Admission weight: 146 lb 12.8 oz (66.6 kg)   Wt Readings from Last 3 Encounters:   05/07/23 126 lb 8 oz (57.4 kg)   01/15/23 146 lb 13.2 oz (66.6 kg)   04/09/22 127 lb 3.2 oz (57.7 kg)     Intake & Output Reviewed: Yes     Intake/Output Summary (Last 24 hours) at 5/8/2023 4169  Last data filed at 5/7/2023 2589  Gross
None    Diet Reviewed: Yes   ADULT DIET; Regular    Goal of Care Reviewed: Yes   Patient and/or Family's stated Goal of Care this Admission: reduce shortness of breath, increase activity tolerance, better understand heart failure and disease management, be more comfortable, and reduce lower extremity edema prior to discharge.
Adult  Goal: Maintains optimal cardiac output and hemodynamic stability  Outcome: Progressing  Goal: Absence of cardiac dysrhythmias or at baseline  Outcome: Progressing

## 2023-05-09 NOTE — DISCHARGE INSTR - COC
Continuity of Care Form    Patient Name: Ambar Michael   :  [birth year]  MRN:  [id number]    Admit date:  2023  Discharge date:  5/10/2023    Code Status Order: Full Code   Advance Directives:     Admitting Physician:  Nohemi Payton MD  PCP: Prabhjot Johnson MD    Discharging Nurse: Jill Pantoja, [facility] Unit/Room#: /1973-  Discharging Unit Phone Number: [phone]    Emergency Contact:   Extended Emergency Contact Information  Primary Emergency Contact: [address] of [address] [place] [address] Phone: [phone]  Relation: Child  Secondary Emergency Contact: [address] [facility] Phone: [phone]  Mobile Phone: [phone]  Relation: Niece/Nephew    Past Surgical History:  Past Surgical History:   Procedure Laterality Date    BRONCHOSCOPY  2019    Dr. Ashley Brown - w/BAL    CARDIAC CATHETERIZATION  2019    Dr. Jack Trevizo 2020    COLONOSCOPY DIAGNOSTIC performed by Stefano Woodard DO at [facility] 10/22/2021    COLONOSCOPY POLYPECTOMY SNARE/COLD BIOPSY performed by Nathaly Rogers MD at [address] [facility] N/A 2019    OFF PUMP CORONARY ARTERY BYPASS GRAFTING X2, INTERNAL MAMMARY ARTERY, SAPHENOUS VEIN GRAFT performed by Marianne Khan MD at [address] CATH  2021    IR MIDLINE CATH 2021 [address], PARTIAL Left     SIGMOIDOSCOPY  2020    4 bands    SIGMOIDOSCOPY N/A 2020    FLEX SIG W/ BANDING SIGMOIDOSCOPY DIAGNOSTIC FLEXIBLE performed by Stefano Woodard DO at [address] 2023    EGD DIAGNOSTIC ONLY performed by Hosea Gupta MD at [address] 2023    EGD BIOPSY performed by Hosea Gupta MD at [address]  2023    EGD

## 2023-05-10 VITALS
BODY MASS INDEX: 21.37 KG/M2 | OXYGEN SATURATION: 94 % | RESPIRATION RATE: 18 BRPM | HEIGHT: 64 IN | HEART RATE: 76 BPM | SYSTOLIC BLOOD PRESSURE: 130 MMHG | DIASTOLIC BLOOD PRESSURE: 77 MMHG | TEMPERATURE: 97.5 F | WEIGHT: 125.2 LBS

## 2023-05-10 LAB
ANION GAP SERPL CALCULATED.3IONS-SCNC: 13 MMOL/L (ref 3–16)
BUN SERPL-MCNC: 55 MG/DL (ref 7–20)
CALCIUM SERPL-MCNC: 9.6 MG/DL (ref 8.3–10.6)
CHLORIDE SERPL-SCNC: 107 MMOL/L (ref 99–110)
CO2 SERPL-SCNC: 24 MMOL/L (ref 21–32)
CREAT SERPL-MCNC: 1.6 MG/DL (ref 0.6–1.2)
GFR SERPLBLD CREATININE-BSD FMLA CKD-EPI: 32 ML/MIN/{1.73_M2}
GLUCOSE SERPL-MCNC: 90 MG/DL (ref 70–99)
POTASSIUM SERPL-SCNC: 4.2 MMOL/L (ref 3.5–5.1)
SODIUM SERPL-SCNC: 144 MMOL/L (ref 136–145)

## 2023-05-10 PROCEDURE — 2580000003 HC RX 258: Performed by: HOSPITALIST

## 2023-05-10 PROCEDURE — 94640 AIRWAY INHALATION TREATMENT: CPT

## 2023-05-10 PROCEDURE — 2700000000 HC OXYGEN THERAPY PER DAY

## 2023-05-10 PROCEDURE — 99232 SBSQ HOSP IP/OBS MODERATE 35: CPT | Performed by: INTERNAL MEDICINE

## 2023-05-10 PROCEDURE — 6370000000 HC RX 637 (ALT 250 FOR IP): Performed by: HOSPITALIST

## 2023-05-10 PROCEDURE — 99239 HOSP IP/OBS DSCHRG MGMT >30: CPT | Performed by: INTERNAL MEDICINE

## 2023-05-10 PROCEDURE — 80048 BASIC METABOLIC PNL TOTAL CA: CPT

## 2023-05-10 PROCEDURE — 94761 N-INVAS EAR/PLS OXIMETRY MLT: CPT

## 2023-05-10 PROCEDURE — 36415 COLL VENOUS BLD VENIPUNCTURE: CPT

## 2023-05-10 RX ORDER — PREDNISONE 10 MG/1
TABLET ORAL
Qty: 22 TABLET | Refills: 0 | DISCHARGE
Start: 2023-05-10

## 2023-05-10 RX ADMIN — IPRATROPIUM BROMIDE AND ALBUTEROL SULFATE 1 AMPULE: .5; 2.5 SOLUTION RESPIRATORY (INHALATION) at 07:59

## 2023-05-10 RX ADMIN — IPRATROPIUM BROMIDE AND ALBUTEROL SULFATE 1 AMPULE: .5; 2.5 SOLUTION RESPIRATORY (INHALATION) at 11:46

## 2023-05-10 RX ADMIN — PREDNISONE 40 MG: 20 TABLET ORAL at 09:35

## 2023-05-10 RX ADMIN — SUCRALFATE 1 G: 1 TABLET ORAL at 09:32

## 2023-05-10 RX ADMIN — METOPROLOL TARTRATE 25 MG: 25 TABLET, FILM COATED ORAL at 09:32

## 2023-05-10 RX ADMIN — APIXABAN 2.5 MG: 5 TABLET, FILM COATED ORAL at 09:32

## 2023-05-10 RX ADMIN — Medication 2 PUFF: at 07:59

## 2023-05-10 RX ADMIN — CLOPIDOGREL BISULFATE 75 MG: 75 TABLET ORAL at 09:35

## 2023-05-10 RX ADMIN — BUSPIRONE HYDROCHLORIDE 5 MG: 5 TABLET ORAL at 12:27

## 2023-05-10 RX ADMIN — Medication 400 MG: at 09:32

## 2023-05-10 RX ADMIN — GUAIFENESIN 600 MG: 600 TABLET, EXTENDED RELEASE ORAL at 09:32

## 2023-05-10 RX ADMIN — SERTRALINE HYDROCHLORIDE 150 MG: 50 TABLET ORAL at 09:32

## 2023-05-10 RX ADMIN — PANTOPRAZOLE SODIUM 40 MG: 40 TABLET, DELAYED RELEASE ORAL at 05:25

## 2023-05-10 RX ADMIN — TIOTROPIUM BROMIDE INHALATION SPRAY 2 PUFF: 3.12 SPRAY, METERED RESPIRATORY (INHALATION) at 07:59

## 2023-05-10 RX ADMIN — BUSPIRONE HYDROCHLORIDE 5 MG: 5 TABLET ORAL at 09:32

## 2023-05-10 RX ADMIN — Medication 10 ML: at 09:40

## 2023-05-10 NOTE — FLOWSHEET NOTE
05/10/23 0735   Vital Signs   Temp 97.5 °F (36.4 °C)   Temp Source Oral   Pulse 87   Heart Rate Source Monitor   /77   MAP (Calculated) 95   BP Location Right upper arm   BP Method Automatic   Patient Position Semi fowlers   Level of Consciousness 0   Pain Assessment   Pain Assessment None - Denies Pain   Oxygen Therapy   SpO2 96 %   O2 Device Nasal cannula   O2 Flow Rate (L/min) 2 L/min     AM assessment complete. Pt a&o x4. Intermittent confusion. C/o sob d/t congestion on nose. Currently on 2L via nc. Tolerating well. No edema. Using purewick. Call light and bedside table within reach. Will continue to monitor.

## 2023-05-10 NOTE — DISCHARGE SUMMARY
Name:  Demi Stone  Room:  /7615-61  MRN:    [id number]    Discharge Summary      This discharge summary is in conjunction with a complete physical exam done on the day of discharge. Discharging Physician: Emil Buchanan MD      Admit: 5/7/2023  Discharge:  5/10/2023     Diagnoses this Admission    Principal Problem:    COPD exacerbation ([facility]  Active Problems:    Dyspnea and respiratory abnormalities    Elevated troponin    Myocardiopathy (HCC)    Acute on chronic systolic congestive heart failure (HCC)    PAF (paroxysmal atrial fibrillation) (HCC)    Chronic hypoxemic respiratory failure (HCC)    Acute systolic CHF (congestive heart failure) ([facility]  Resolved Problems:    * No resolved hospital problems. *          Procedures (Please Review Full Report for Details)      Consults    IP CONSULT TO CARDIOLOGY  IP CONSULT TO HOSPITALIST  IP CONSULT TO PULMONOLOGY  IP CONSULT TO CARDIOLOGY  IP CONSULT TO HEART FAILURE NURSE/COORDINATOR  IP CONSULT TO DIETITIAN      HPI:  Becca Mcfadden is a 81 y.o.   female  with PMH significant for COPD on home oxygen 3 L, CHF/systolic, CAD, chronic kidney disease stage III, paroxysmal atrial fibrillation, diabetes mellitus type 2, hypertension, GERD, hyperlipidemia. Today she  presents with  ZENDEJAS for few days. Denies CP Cough. Denies Fever chills rigors. ED was given lasix 40 and iv methylpred 125 and duoneb . ED spoke with card who rec. Ok to admit to [facility]. Becca Mcfadden is a 81 y.o.   female  with PMH significant for COPD on home oxygen 3 L, CHF/systolic, CAD, chronic kidney disease stage III, paroxysmal atrial fibrillation, diabetes mellitus type 2, hypertension, GERD, hyperlipidemia. Today she  presents with  [facility] for few days. Denies CP Cough. Denies Fever chills rigors. ED was given lasix 40 and iv methylpred 125 and duoneb . ED spoke with card who rec. Ok to admit to [facility].           Hospital Course    Acute on Chronic Systolic

## 2023-05-10 NOTE — CARE COORDINATION
05/08/23 1708   Service Assessment   Patient Orientation Alert and Oriented;Person;Place;Situation   Cognition Alert   History Provided By Patient   Primary Caregiver Self   Accompanied By/Relationship daughter Daly Bravo Members   Patient's Healthcare Decision Maker is: Legal Next of Brad Amin   PCP Verified by CM Yes  Ty Christy)   Last Visit to PCP Within last 3 months   Prior Functional Level Mobility;Assistance with the following:   Current Functional Level Assistance with the following:;Bathing;Dressing; Mobility   Can patient return to prior living arrangement Yes   Ability to make needs known: Good   Family able to assist with home care needs: Yes   Would you like for me to discuss the discharge plan with any other family members/significant others, and if so, who? Yes   [facility]  ([facility])   CM/SW Referral ADLs/IADLs   Discharge Planning   Type of Residence House   Living Arrangements Alone   Current Services Prior To Admission Durable Medical Equipment   Current DME Prior to [facility] Needed N/A   DME Ordered? No   Potential Assistance Purchasing Medications No   Type of Home Care Services None   Patient expects to be discharged to: House   Follow Up Appointment: Best Day/Time    (per daughter/dependant for transportation)   One/Two Story Residence One story   History of falls?  1
CM delivered 2nd IMM and provided verbal explanation at bedside. Pt voiced understanding of discharge MCR rights and is agreeable to discharge within 4 hours of delivery of the IMM notice.
DISCHARGE ORDER  Date/Time 5/10/2023 12:04 PM  Completed by: Kulwant Campbell RN, Case Management    Patient Name: Gomez Leal    : 1940      Admit order Date and Status: IP 2023  Noted discharge order. (verify MD's last order for status of admission/Traditional Medicare 3 [place] Inpatient qualifying stay required for SNF)    Confirmed discharge plan with:              Patient:  Yes/daughter, Sanket                                   Discharge to Facility:     Name: [facility])  Address: [place] [address], [place], [place], [place] [address]  Fax: [phone]      Facility phone number for staff giving report: see above   Pre-certification completed: [address] Notification (HENS) completed: JONATHAN   Discharge orders and Continuity of Care faxed to facility:  [facility]      Transportation:               Medical Transport explained with choice list offered to pt/family. Choice: (no preference)  Agency used: [address] up time:   13:00 Pm      Pt/family/Nursing/Facility aware of  time:   pt/daughter  [facility] admissions ()  [facility] [place])  Ambulance form completed: YES      Date Last IMM Given: 05/10/23    Comments:    Pt is being d/c'd to [facility] today. Pt's O2 sats are 94% on 2 liters. Discharge timeout done with Aracelis Rodriguez. All discharge needs and concerns addressed. Discharging nurse to complete LUPE, reconcile AVS, and place final copy with patient's discharge packet. Discharging RN to ensure that written prescriptions for  Level II medications are sent with patient to the facility as per protocol.
INTERDISCIPLINARY PLAN OF CARE CONFERENCE    Date/Time: 5/9/2023 1:04 PM  Completed by: Soila Martin RN, Case Management      Patient Name:  Mere Etienne  YOB: 1940  Admitting Diagnosis: Dyspnea and respiratory abnormalities [R06.00, R06.89]  COPD exacerbation ([facility] [address] [J44.1]     Admit Date/Time:  5/7/2023  4:29 PM    Chart reviewed. Interdisciplinary team contacted or reviewed plan related to patient progress and discharge plans. Disciplines included Case Management, Nursing, and Dietitian. Current Status: IP 05/07/2023  PT/OT recommendation for discharge plan of care: ordered    Expected D/C Disposition:  Skilled nursing facility  Confirmed plan with patient  Discharge Plan Comments: Chart reviewed. Met with pt at bedside and explained the role of the CM. Plans to go to rehab - requests [facility]. Accepted for rehab at [facility] and bed will be available tomorrow. Covid screening ordered.   CM following    Home O2 in place on admit: Yes   Pt informed of need to bring portable home O2 tank on day of discharge for nursing to connect prior to leaving:  Not Indicated  Verbalized agreement/Understanding:  Not Indicated
discuss the discharge plan with any other family members/significant others, and if so, who? (P) Yes  Plans to Return to Present Housing: (P) Yes  Other Identified Issues/Barriers to RETURNING to current housing: NA  Potential Assistance needed at discharge: (P) N/A            Potential DME:    Patient expects to discharge to: (P) [facility] for transportation at discharge:      Financial    Payor: Robert Fluke / Plan: MEDICARE PART A AND B / Product Type: *No Product type* /     Does insurance require precert for SNF: No    Potential assistance Purchasing Medications: (P) No  Meds-to-Beds request: Yes      [facility] - [address] [place], [place] [address]  [address] [place] [address] [address]  Phone: [phone] Fax: [facility] [facility] [address] [facility] [address]  [address]  [address] [place] [id number]  Phone: [phone] Fax: [phone]      Notes:    Factors facilitating achievement of predicted outcomes: Family support, Cooperative, and Pleasant    Barriers to discharge: Decreased endurance    Additional Case Management Notes: Chart reviewed. Met with pt at bedside and explained the role of the CM. Plan: Interesting in rehab. Referral to [facility] (pt's 1st choice for SNF). +CM following        The Plan for Transition of Care is related to the following treatment goals of Dyspnea and respiratory abnormalities [R06.00, R06.89]  COPD exacerbation ([facility] [address] [E68.0]    IF APPLICABLE: The Patient and/or patient representative Yosvany Grewal and her family were provided with a choice of provider and agrees with the discharge plan. Freedom of choice list with basic dialogue that supports the patient's individualized plan of care/goals and shares the quality data associated with the providers was provided to:     Patient Representative Name:       The Patient and/or Patient Representative Agree with the Discharge Plan?

## 2023-05-10 NOTE — PROGRESS NOTES
05/08/23 0100   RT Protocol   History Pulmonary Disease 2   Respiratory pattern 0   Breath sounds 2   Cough 0   Indications for Bronchodilator Therapy On home bronchodilators   Bronchodilator Assessment Score 4     RT Inhaler-Nebulizer Bronchodilator Protocol Note    There is a bronchodilator order in the chart from a provider indicating to follow the RT Bronchodilator Protocol and there is an Initiate RT Inhaler-Nebulizer Bronchodilator Protocol order as well (see protocol at bottom of note). CXR Findings:  XR CHEST PORTABLE    Result Date: 5/7/2023  Lungs are clear       The findings from the last RT Protocol Assessment were as follows:   History Pulmonary Disease: Chronic pulmonary disease  Respiratory Pattern: Regular pattern and RR 12-20 bpm  Breath Sounds: Slightly diminished and/or crackles  Cough: Strong, spontaneous, non-productive  Indication for Bronchodilator Therapy: On home bronchodilators  Bronchodilator Assessment Score: 4    Aerosolized bronchodilator medication orders have been revised according to the RT Inhaler-Nebulizer Bronchodilator Protocol below. Respiratory Therapist to perform RT Therapy Protocol Assessment initially then follow the protocol. Repeat RT Therapy Protocol Assessment PRN for score 0-3 or on second treatment, BID, and PRN for scores above 3. No Indications - adjust the frequency to every 6 hours PRN wheezing or bronchospasm, if no treatments needed after 48 hours then discontinue using Per Protocol order mode. If indication present, adjust the RT bronchodilator orders based on the Bronchodilator Assessment Score as indicated below.   Use Inhaler orders unless patient has one or more of the following: on home nebulizer, not able to hold breath for 10 seconds, is not alert and oriented, cannot activate and use MDI correctly, or respiratory rate 25 breaths per minute or more, then use the equivalent nebulizer order(s) with same Frequency and PRN reasons based on
05/08/23 2209   NIV Type   Skin Assessment Clean, dry, & intact   Skin Protection for O2 Device Yes   NIV Started/Stopped (S)  On   Equipment Type V60   Mode Bilevel   Mask Type Full face mask   Mask Size Small   Settings/Measurements   IPAP 14 cmH20   CPAP/EPAP 7 cmH2O   Vt (Measured) 587 mL   Rate Ordered 12   Respirations 24   FiO2  35 %   Minute Volume (L/min) 17.7 Liters   Mask Leak (lpm) 12 lpm   Comfort Level Good   Using Accessory Muscles No   SpO2 98   Patient's Home Machine No   Alarm Settings   Alarms On Y
05/09/23 0023   NIV Type   NIV Started/Stopped (S)  Off
05/10/23 0030   Settings/Measurements   Patient's Home Machine (S)  Yes (type/vendor)
Admission questions completed. Called pt's daughter-in-law for updated med list, daughter-in-law states she isn't home and doesn't have list with her. She states she will call after 0700 tomorrow with pt's list.  Removed bandage from skin tear that ED placed, minimal bleeding. Xeroform and curlex applied. Pt provided with snack/drink since she will be NPO @ [facility] due to cardiology consult, pt aware and agreeable. No further needs expressed, call light and bedside table within reach. Will continue to monitor.
Attempted to call report to [facility] to give report but phone rang for 3minutes.
Echo results personally reviewed, and showed normal LV function/recovery of LVEF relative to previous study in January. Continue current medications, restart p.o. Lasix at discharge. She has no discernible volume overload by exam and RA pressure is 3 mmHg by echo. Cardiology service will sign off.     We will arrange follow-up with our office in 4 to 6 weeks with Danial Boyer MD, [address] [facility]  [phone] [facility]  [phone] [facility]
End of shift report given to Harinder Rojas RN. Transfer of care done at this time.
Hand off report given to Cailin Rebollar RN. Patient is stable showing no signs of distress and has no current needs at this time. Call light is in reach and bed is in lowest position. Care is transferred at this time.
Handoff report given to [facility]. Patient is in stable condition and has no needs at this time. Call light is in reach and bed is in the lowest position. Care is transferred at this time.
Heaven Dougherty from [facility] called to get report on pt. Gave report. Answered all questions.
Inpatient Physical Therapy Evaluation    Unit: PCU  Date:  5/9/2023  Patient Name:    Sakshi Almonte  Admitting diagnosis:  Dyspnea and respiratory abnormalities [R06.00, R06.89]  [facility] [J44.1]  Admit Date:  5/7/2023  Precautions/Restrictions/WB Status/ Lines/ Wounds/ Oxygen: Fall risk, Bed/chair alarm, Lines (Supplemental O2 (2.5L) and internal catheter), Telemetry, Continuous pulse oximetry, and Isolation Precautions: Contact    Treatment Time:  15:28-16:08  Treatment Number:  1  Timed Code Treatment Minutes: 30 minutes  Total Treatment Minutes:  40  minutes    Patient Stated Goals for Therapy: \" be able to walk and breath again \"          Discharge Recommendations: SNF  DME needs for discharge: Defer to facility (expect no new needs)       Therapy recommendation for EMS Transport: can transport by wheelchair    Therapy recommendations for staff:   Assist of 1 for transfers with use of rolling walker (RW) and gait belt to/from [facility]  to/from chair    History of Present Illness:    81 y.o.   female  with PMH significant for COPD on home oxygen 3 L, CHF/systolic, CAD, chronic kidney disease stage III, paroxysmal atrial fibrillation, diabetes mellitus type 2, hypertension, GERD, hyperlipidemia. She presents with  ZENDEJAS for few days. Home Health S4 Level Recommendation:  NA    AM-PAC Mobility Score       AM-PAC Inpatient Mobility without Stair Climbing Raw Score : 18    Subjective  Patient lying reclined bed with no family present. Pt agreeable to this PT session. Cognition    A&O x4   Able to follow 2 step commands    Pain   No  Location: N/A  Rating: NA /10  Pain Medicine Status: No request made    Preadmission Environment    Pt. Lives with family (son and son's girlfriend, a retired nurse). Son stays with patient during the day because helper who was assisting patient during the day is ill right now. Son plans to return to work once helper returns. Family live \"up the road\".    Home
Patient admitted to room 327 from ED. Patient oriented to room, call light, bed rails, phone, lights and bathroom. Patient instructed about the schedule of the day including: vital sign frequency, lab draws, possible tests, frequency of MD and staff rounds, daily weights, I &O's and prescribed diet. Mxtl 9 Telemetry box in place, patient aware of placement and reason. Bed locked, in lowest position, side rails up 2/4, call light within reach. Recliner Assessment  Patient is not able to demonstrated the ability to move from a reclining position to an upright position within the recliner. however patient is alert, oriented and able to provide informed consent       4 Eyes Skin Assessment     The patient is being assess for   Admission    I agree that 2 RN's have performed a thorough Head to Toe Skin Assessment on the patient. ALL assessment sites listed below have been assessed. Areas assessed for pressure by both nurses:   [x]   Head, Face, and Ears   [x]   Shoulders, Back, and Chest, Abdomen  [x]   Arms, Elbows, and Hands   [x]   Coccyx, Sacrum, and Ischium  [x]   Legs, Feet, and Heels        Skin Assessed Under all Medical Devices by both nurses:  O2 device tubing        Scattered abrasions noted to chin, scattered bruising/abrasions to BUE, scattered bruising to BLE, skin tear noted to L medial shin/calf area, blanchable redness to L heel-mepilex placed bilaterally. Please list where Mepilex Borders are located:  Bilateral heels             **SHARE this note so that the co-signing nurse is able to place an eSignature**    Co-signer eSignature: Electronically signed by Avtar Cedillo RN on 5/8/23 at 7:00 AM EDT    Does the Patient have Skin Breakdown related to pressure?   No     (Insert Photo here  )         Trey Prevention initiated:  Yes   Wound Care Orders initiated:  No      WOC nurse consulted for Pressure Injury (Stage 3,4, Unstageable, DTI, NWPT, Complex wounds)and New or
Patient educated on discharge instructions as well as new medications use, dosage, administration and possible side effects. Patient verified knowledge. IV removed without difficulty and dry dressing in place. Telemetry monitor removed and returned to [facility]. Pt left facility in stable condition to SNF with all of their personal belongings.
Prevention  dressing applied to bridge of nose and other facial bony prominences upon BiPAP/CPAP initiation. Consulted RN regarding the assessment of skin integrity.
Progress Note    Admit Date:  5/7/2023    Admitted for dyspnea on exertion, COPD AE and possible CHF AE    Subjective:  Ms. Brinda Bryant reports feeling overall well when at rest.  Getting breathing tx currently. Still on 3 L, still with dyspnea on exertion. Objective:   Patient Vitals for the past 4 hrs:   BP Temp Temp src Pulse Resp SpO2   05/09/23 0931 117/71 -- -- 91 -- --   05/09/23 0807 129/67 97 °F (36.1 °C) Oral 70 18 95 %   05/09/23 0756 -- -- -- -- -- 98 %   05/09/23 0738 117/71 97.2 °F (36.2 °C) Axillary 85 18 99 %          Intake/Output Summary (Last 24 hours) at 5/9/2023 1027  Last data filed at 5/9/2023 0931  Gross per 24 hour   Intake 340 ml   Output 400 ml   Net -60 ml       Physical Exam:  Gen: No distress. Alert. Appears chronically ill and frail   Eyes: PERRL. No sclera icterus. No conjunctival injection. Neck: No JVD. Trachea midline. Resp: +accessory muscle use. No crackles. Faint expiratory wheezes. No rhonchi. CV: Regular rate. Regular rhythm. No murmur. No rub. No edema. GI: Non-tender. Non-distended. Normal bowel sounds. Skin: Warm and dry. No nodule on exposed extremities. No rash on exposed extremities. + scattered ecchymotic areas, +skin tears dressing to LLE and heels   M/S: No cyanosis. No joint deformity. No clubbing. Neuro: Awake. Grossly nonfocal    Psych: Oriented x 3. No anxiety or agitation.         Scheduled Meds:   apixaban  2.5 mg Oral BID    atorvastatin  40 mg Oral Nightly    busPIRone  5 mg Oral TID    clopidogrel  75 mg Oral Daily    guaiFENesin  600 mg Oral BID    magnesium oxide  400 mg Oral Daily    metoprolol tartrate  25 mg Oral BID    pantoprazole  40 mg Oral BID AC    sertraline  150 mg Oral Daily    sucralfate  1 g Oral 2 times per day    sodium chloride flush  5-40 mL IntraVENous 2 times per day    ipratropium-albuterol  1 ampule Inhalation Q4H WA    methylPREDNISolone  40 mg IntraVENous Q6H    Followed by    Leldon Sensor ON 5/10/2023] predniSONE  40 mg
Progress Note    Admit Date:  5/7/2023    Admitted for dyspnea on exertion, COPD AE and possible CHF AE    Subjective:  Ms. Dayanara Morrow is resting in bed. On oxygen 3 liters. Stated she feels a little better than yesterday. Objective:   Patient Vitals for the past 4 hrs:   BP Temp Temp src Pulse SpO2   05/10/23 0759 -- -- -- -- 95 %   05/10/23 0735 130/77 97.5 °F (36.4 °C) Oral 87 96 %            Intake/Output Summary (Last 24 hours) at 5/10/2023 1047  Last data filed at 5/10/2023 0828  Gross per 24 hour   Intake 328 ml   Output 420 ml   Net -92 ml         Physical Exam:  Gen: No distress. Alert. Appears chronically ill and frail   Lying flat on assessment   Eyes: PERRL. No sclera icterus. No conjunctival injection. ENT: No discharge. Pharynx clear. Neck: No JVD. Trachea midline. Resp: +accessory muscle use. No crackles. Faint expiratory wheezes. No rhonchi. CV: Regular rate. Regular rhythm. No murmur. No rub. No edema. Capillary Refill: Brisk,< 3 seconds   GI: Non-tender. Non-distended. Normal bowel sounds. Skin: Warm and dry. No nodule on exposed extremities. No rash on exposed extremities. + scattered ecchymotic areas, +skin tears dressing to LLE and heels   Skin very thin and frail   M/S: No cyanosis. No joint deformity. No clubbing. Neuro: Awake. Grossly nonfocal    Psych: Oriented x 3. No anxiety or agitation. Tomas Brown MD have reviewed the chart on [facility] and personally interviewed and examined patient, reviewed the data (labs and imaging) and after discussion with my PA formulated the plan. Agree with note with the following edits. I reviewed the patient's Past Medical History, Past Surgical History, Medications, and Allergies. Physical exam:    /77   Pulse 87   Temp 97.5 °F (36.4 °C) (Oral)   Resp 18   Ht 5' 4\" (1.626 m)   Wt 125 lb 3.2 oz (56.8 kg)   SpO2 95%   BMI 21.49 kg/m²     Gen: No distress. Alert. Eyes: PERRL.  No
Pt removed bipap from face.  NC reinstated
Pulmonary Progress Note    CC: 0 exacerbation    Subjective:   Appears comfortable on 3 L O2  BiPAP overnight        Intake/Output Summary (Last 24 hours) at 5/9/2023 0620  Last data filed at 5/9/2023 0548  Gross per 24 hour   Intake 220 ml   Output 900 ml   Net -680 ml       Exam:   /68   Pulse 86   Temp 97.1 °F (36.2 °C) (Axillary)   Resp 18   Ht 5' 4\" (1.626 m)   Wt 125 lb 3.2 oz (56.8 kg)   SpO2 100%   BMI 21.49 kg/m²  on 3 L  Gen: Mild distress. Alert  Eyes PERRL. No sclera icterus. No conjunctival injection. ENT: No discharge. Pharynx clear. Neck: Trachea midline. No obvious mass. Resp: Mild accessory muscle use. No crackles. Bilateral wheezes. No rhonchi. No dullness on percussion. CV: Regular rate. Regular rhythm. No murmur or rub. No edema. GI: Non-tender. Non-distended. No hernia. Skin: Warm and dry. No nodule on exposed extremities. Lymph: No cervical LAD. No supraclavicular LAD. M/S: No cyanosis. No joint deformity. No clubbing. Neuro: Awake. Alert. Moves all four extremities. Psych: Oriented x 3.   Mild anxiety    Scheduled Meds:   apixaban  2.5 mg Oral BID    atorvastatin  40 mg Oral Nightly    busPIRone  5 mg Oral TID    clopidogrel  75 mg Oral Daily    guaiFENesin  600 mg Oral BID    magnesium oxide  400 mg Oral Daily    metoprolol tartrate  25 mg Oral BID    pantoprazole  40 mg Oral BID AC    sertraline  150 mg Oral Daily    sucralfate  1 g Oral 2 times per day    sodium chloride flush  5-40 mL IntraVENous 2 times per day    ipratropium-albuterol  1 ampule Inhalation Q4H WA    methylPREDNISolone  40 mg IntraVENous Q6H    Followed by    Dontae Candelaria ON 5/10/2023] predniSONE  40 mg Oral Daily    tiotropium  2 puff Inhalation Daily    budesonide-formoterol  2 puff Inhalation BID     Continuous Infusions:   sodium chloride       PRN Meds:  perflutren lipid microspheres, fluticasone, benzonatate, sodium chloride flush, sodium chloride, ondansetron **OR** ondansetron,
Pulmonary Progress Note    CC: 0 exacerbation    Subjective:   Feels better  O2 trending down, 2 L  Used her home iVAPS overnight and did very well, likes the pressure change done by me yesterday          Intake/Output Summary (Last 24 hours) at 5/10/2023 7875  Last data filed at 5/9/2023 1907  Gross per 24 hour   Intake 368 ml   Output 820 ml   Net -452 ml       Exam:   /77   Pulse 87   Temp 97.5 °F (36.4 °C) (Oral)   Resp 18   Ht 5' 4\" (1.626 m)   Wt 125 lb 3.2 oz (56.8 kg)   SpO2 96%   BMI 21.49 kg/m²  on 3 L  Constitutional:  No acute distress. [place] HEENT: no scleral icterus  Neck: No tracheal deviation present. Cardiovascular: Normal heart sounds. Pulmonary/Chest: No wheezes. No rhonchi. Few rales. No decreased breath sounds. No accessory muscle usage or stridor. Abdominal: Soft. Musculoskeletal: No cyanosis. No clubbing. Skin: Skin is warm and dry.      Scheduled Meds:   apixaban  2.5 mg Oral BID    atorvastatin  40 mg Oral Nightly    busPIRone  5 mg Oral TID    clopidogrel  75 mg Oral Daily    guaiFENesin  600 mg Oral BID    magnesium oxide  400 mg Oral Daily    metoprolol tartrate  25 mg Oral BID    pantoprazole  40 mg Oral BID AC    sertraline  150 mg Oral Daily    sucralfate  1 g Oral 2 times per day    sodium chloride flush  5-40 mL IntraVENous 2 times per day    ipratropium-albuterol  1 ampule Inhalation Q4H WA    predniSONE  40 mg Oral Daily    tiotropium  2 puff Inhalation Daily    budesonide-formoterol  2 puff Inhalation BID     Continuous Infusions:   sodium chloride       PRN Meds:  perflutren lipid microspheres, fluticasone, benzonatate, sodium chloride flush, sodium chloride, ondansetron **OR** ondansetron, polyethylene glycol, acetaminophen **OR** acetaminophen, albuterol    Labs:  CBC:   Recent Labs     05/07/23  1643 05/08/23  0452   WBC 11.6* 8.1   HGB 12.4 12.4   HCT 38.4 37.8   MCV 93.4 91.8    204     BMP:   Recent Labs     05/08/23  0452 05/09/23  1158
RT Inhaler-Nebulizer Bronchodilator Protocol Note    There is a bronchodilator order in the chart from a provider indicating to follow the RT Bronchodilator Protocol and there is an Initiate RT Inhaler-Nebulizer Bronchodilator Protocol order as well (see protocol at bottom of note). CXR Findings:  No results found. The findings from the last RT Protocol Assessment were as follows:   History Pulmonary Disease: (P) Chronic pulmonary disease  Respiratory Pattern: (P) Regular pattern and RR 12-20 bpm  Breath Sounds: (P) Slightly diminished and/or crackles  Cough: (P) Strong, productive  Indication for Bronchodilator Therapy: (P) Decreased or absent breath sounds  Bronchodilator Assessment Score: (P) 5    Aerosolized bronchodilator medication orders have been revised according to the RT Inhaler-Nebulizer Bronchodilator Protocol below. Respiratory Therapist to perform RT Therapy Protocol Assessment initially then follow the protocol. Repeat RT Therapy Protocol Assessment PRN for score 0-3 or on second treatment, BID, and PRN for scores above 3. No Indications - adjust the frequency to every 6 hours PRN wheezing or bronchospasm, if no treatments needed after 48 hours then discontinue using Per Protocol order mode. If indication present, adjust the RT bronchodilator orders based on the Bronchodilator Assessment Score as indicated below. Use Inhaler orders unless patient has one or more of the following: on home nebulizer, not able to hold breath for 10 seconds, is not alert and oriented, cannot activate and use MDI correctly, or respiratory rate 25 breaths per minute or more, then use the equivalent nebulizer order(s) with same Frequency and PRN reasons based on the score. If a patient is on this medication at home then do not decrease Frequency below that used at home.     0-3 - enter or revise RT bronchodilator order(s) to equivalent RT Bronchodilator order with Frequency of every 4 hours PRN for
RT Inhaler-Nebulizer Bronchodilator Protocol Note    There is a bronchodilator order in the chart from a provider indicating to follow the RT Bronchodilator Protocol and there is an Initiate RT Inhaler-Nebulizer Bronchodilator Protocol order as well (see protocol at bottom of note). CXR Findings:  No results found. The findings from the last RT Protocol Assessment were as follows:   History Pulmonary Disease: Chronic pulmonary disease  Respiratory Pattern: Dyspnea on exertion or RR 21-25 bpm  Breath Sounds: Slightly diminished and/or crackles  Cough: Strong, productive  Indication for Bronchodilator Therapy: Decreased or absent breath sounds  Bronchodilator Assessment Score: 7    Aerosolized bronchodilator medication orders have been revised according to the RT Inhaler-Nebulizer Bronchodilator Protocol below. Respiratory Therapist to perform RT Therapy Protocol Assessment initially then follow the protocol. Repeat RT Therapy Protocol Assessment PRN for score 0-3 or on second treatment, BID, and PRN for scores above 3. No Indications - adjust the frequency to every 6 hours PRN wheezing or bronchospasm, if no treatments needed after 48 hours then discontinue using Per Protocol order mode. If indication present, adjust the RT bronchodilator orders based on the Bronchodilator Assessment Score as indicated below. Use Inhaler orders unless patient has one or more of the following: on home nebulizer, not able to hold breath for 10 seconds, is not alert and oriented, cannot activate and use MDI correctly, or respiratory rate 25 breaths per minute or more, then use the equivalent nebulizer order(s) with same Frequency and PRN reasons based on the score. If a patient is on this medication at home then do not decrease Frequency below that used at home.     0-3 - enter or revise RT bronchodilator order(s) to equivalent RT Bronchodilator order with Frequency of every 4 hours PRN for wheezing or increased work
RT Inhaler-Nebulizer Bronchodilator Protocol Note    There is a bronchodilator order in the chart from a provider indicating to follow the RT Bronchodilator Protocol and there is an Initiate RT Inhaler-Nebulizer Bronchodilator Protocol order as well (see protocol at bottom of note). CXR Findings:  XR CHEST PORTABLE    Result Date: 5/7/2023  Lungs are clear       The findings from the last RT Protocol Assessment were as follows:   History Pulmonary Disease: (P) Chronic pulmonary disease  Respiratory Pattern: (P) Regular pattern and RR 12-20 bpm  Breath Sounds: (P) Slightly diminished and/or crackles  Cough: (P) Strong, spontaneous, non-productive  Indication for Bronchodilator Therapy: (P) Decreased or absent breath sounds  Bronchodilator Assessment Score: (P) 4    Aerosolized bronchodilator medication orders have been revised according to the RT Inhaler-Nebulizer Bronchodilator Protocol below. Respiratory Therapist to perform RT Therapy Protocol Assessment initially then follow the protocol. Repeat RT Therapy Protocol Assessment PRN for score 0-3 or on second treatment, BID, and PRN for scores above 3. No Indications - adjust the frequency to every 6 hours PRN wheezing or bronchospasm, if no treatments needed after 48 hours then discontinue using Per Protocol order mode. If indication present, adjust the RT bronchodilator orders based on the Bronchodilator Assessment Score as indicated below. Use Inhaler orders unless patient has one or more of the following: on home nebulizer, not able to hold breath for 10 seconds, is not alert and oriented, cannot activate and use MDI correctly, or respiratory rate 25 breaths per minute or more, then use the equivalent nebulizer order(s) with same Frequency and PRN reasons based on the score. If a patient is on this medication at home then do not decrease Frequency below that used at home.     0-3 - enter or revise RT bronchodilator order(s) to equivalent RT
RT Inhaler-Nebulizer Bronchodilator Protocol Note    There is a bronchodilator order in the chart from a provider indicating to follow the RT Bronchodilator Protocol and there is an Initiate RT Inhaler-Nebulizer Bronchodilator Protocol order as well (see protocol at bottom of note). CXR Findings:  XR CHEST PORTABLE    Result Date: 5/7/2023  Lungs are clear       The findings from the last RT Protocol Assessment were as follows:   History Pulmonary Disease: (P) Chronic pulmonary disease  Respiratory Pattern: (P) Regular pattern and RR 12-20 bpm  Breath Sounds: (P) Slightly diminished and/or crackles  Cough: (P) Strong, spontaneous, non-productive  Indication for Bronchodilator Therapy: (P) On home bronchodilators  Bronchodilator Assessment Score: (P) 4    Aerosolized bronchodilator medication orders have been revised according to the RT Inhaler-Nebulizer Bronchodilator Protocol below. Respiratory Therapist to perform RT Therapy Protocol Assessment initially then follow the protocol. Repeat RT Therapy Protocol Assessment PRN for score 0-3 or on second treatment, BID, and PRN for scores above 3. No Indications - adjust the frequency to every 6 hours PRN wheezing or bronchospasm, if no treatments needed after 48 hours then discontinue using Per Protocol order mode. If indication present, adjust the RT bronchodilator orders based on the Bronchodilator Assessment Score as indicated below. Use Inhaler orders unless patient has one or more of the following: on home nebulizer, not able to hold breath for 10 seconds, is not alert and oriented, cannot activate and use MDI correctly, or respiratory rate 25 breaths per minute or more, then use the equivalent nebulizer order(s) with same Frequency and PRN reasons based on the score. If a patient is on this medication at home then do not decrease Frequency below that used at home.     0-3 - enter or revise RT bronchodilator order(s) to equivalent RT Bronchodilator
RT Inhaler-Nebulizer Bronchodilator Protocol Note    There is a bronchodilator order in the chart from a provider indicating to follow the RT Bronchodilator Protocol and there is an Initiate RT Inhaler-Nebulizer Bronchodilator Protocol order as well (see protocol at bottom of note). CXR Findings:  XR CHEST PORTABLE    Result Date: 5/7/2023  Lungs are clear       The findings from the last RT Protocol Assessment were as follows:   History Pulmonary Disease: Chronic pulmonary disease  Respiratory Pattern: Dyspnea on exertion or RR 21-25 bpm  Breath Sounds: Slightly diminished and/or crackles  Cough: Strong, spontaneous, non-productive  Indication for Bronchodilator Therapy: Decreased or absent breath sounds  Bronchodilator Assessment Score: 6    Aerosolized bronchodilator medication orders have been revised according to the RT Inhaler-Nebulizer Bronchodilator Protocol below. Respiratory Therapist to perform RT Therapy Protocol Assessment initially then follow the protocol. Repeat RT Therapy Protocol Assessment PRN for score 0-3 or on second treatment, BID, and PRN for scores above 3. No Indications - adjust the frequency to every 6 hours PRN wheezing or bronchospasm, if no treatments needed after 48 hours then discontinue using Per Protocol order mode. If indication present, adjust the RT bronchodilator orders based on the Bronchodilator Assessment Score as indicated below. Use Inhaler orders unless patient has one or more of the following: on home nebulizer, not able to hold breath for 10 seconds, is not alert and oriented, cannot activate and use MDI correctly, or respiratory rate 25 breaths per minute or more, then use the equivalent nebulizer order(s) with same Frequency and PRN reasons based on the score. If a patient is on this medication at home then do not decrease Frequency below that used at home.     0-3 - enter or revise RT bronchodilator order(s) to equivalent RT Bronchodilator order with
Shift assessment completed. Vital signs stable. No needs or complaints at this time.
Shift assessment completed. Vital signs stable. No needs or complaints at this time.
104   CO2 28 29   BUN 18 27*   CREATININE 1.1 1.6*       LIVER PROFILE:   Recent Labs     05/07/23  1643   AST 12*   ALT 7*   BILITOT <0.2   ALKPHOS 67       PT/INR: No results for input(s): PROTIME, INR in the last 72 hours. CULTURES  Sputum culture: ordered     RADIOLOGY  XR CHEST PORTABLE   Final Result      Lungs are clear           Echo 1/7/2023  Summary   The left ventricular systolic function is mildly reduced with an ejection   fraction of 40%. There is mild global hypokinesis. Grade I diastolic dysfunction with normal filing pressure. Mild mitral regurgitation. Aortic valve appears sclerotic but opens adequately. Inadequate tricuspid regurgitation to estimate systolic pulmonary artery   pressure. ECHO  Limited exam for LVEF and IVC. Irregular rhythm throughout the study. Left ventricular systolic function is normal with ejection fraction    estimated at 50-55%. No clear regional wall motion abnormalities. IVC size is normal (<2.1cm) and collapses > 50% with respiration consistent    with normal RA pressure (3mmHg). On direct visual comparison with images from the prior study performed    1/7/2023 (LVEF 40%), LV function appears to have normalized.      Assessment/Plan:    Acute on Chronic Systolic Congestive Heart Failure  - echo as above with EF 40%  -net negative since admission 8800  -BNP 2,496  - trace edema BLE  - on lopressor--plan to change to Toprol with schf   - on PRN lasix at home, given IV lasix, with worsening creatinine, hold  -continue daily weights, low sodium diet, 2000 ml fluid restriction, and strict I/O  Repeat ECHo EF better     Elevated Troponin  CAD  - hx of 2V CABG  - troponin 51--43  - cardiology consulted, recommend to medically manage as she is not a good candidate for angiogram or possible PCI  - repeat echo- limited EF 55%     COPD  AE  Chronic hypoxic respiratory failure   - baseline oxygen 2--currently on 3 liters   - solumedrol for now  -
monitor       Depression  - continue Zoloft and Buspar     Hx of Cdiff     Severe protein calorie malnutrition  - 2/2 COPD  - dietary consulted for supplements     DVT Prophylaxis: eliquis   Diet: Diet NPO Exceptions are: Ice Chips  Code Status: Full Code- discussed with patient       CLIFTON De La Garza - CNP      Agree with above    HECTOR Villagomez.
techniques. Prioritize/Plan: Decide what needs to be done today, and what can wait for a later date, write to do lists, Plan ahead to avoid extra trips, Gather supplies and equipment needed before starting an activity. Position: Avoid tiring and awkward posture that may impair breathing  Pace: Slow and steady pace, never rushing! Pursed lip breathing. Pursed Lip Breathing: \"Smell the roses, blow out the candles\"    Patient []demonstrates / []verbalizes understanding of appropriate employment of Energy Conservation with every day activity. Patient able to complete pursed lip breathing without cues and does not want further instruction in this technique.    ------------------------------------------------------------------------------------------------------------------------------------         4) Fluid intake tracking    Patient  []demonstrates/[]verbalizes ability to correctly identify mL to cups conversion, what constitutes FLUID in diet, and  knowledge of when to  communicate changes in weight to physician consistent with patient orders and HF education.       Pt participated in the following fluid tracking management   [] Identifying 4, 8, and 12 ounces of fluid size  [] Identify mL to cup conversion  [x] Understanding Jello, watermelon and Ice cream contributing to fluid intake   [x] Acknowledge current fluid restriction limits/orders  Pt required assistance or correction of error in the following:     [] Not appropriate for patient  --------------------------------------------------------------------------------------------------------------------------------       Thank you,   Electronically signed by Jvoanni Pritchard OT on 5/9/2023 at 4:22 PM

## 2023-06-07 PROBLEM — R77.8 ELEVATED TROPONIN: Status: RESOLVED | Noted: 2019-09-04 | Resolved: 2023-06-07

## 2023-06-07 PROBLEM — R79.89 ELEVATED TROPONIN: Status: RESOLVED | Noted: 2019-09-04 | Resolved: 2023-06-07

## 2023-06-08 ENCOUNTER — OFFICE VISIT (OUTPATIENT)
Dept: CARDIOLOGY CLINIC | Age: 83
End: 2023-06-08
Payer: MEDICARE

## 2023-06-08 VITALS
HEART RATE: 78 BPM | HEIGHT: 64 IN | WEIGHT: 117.2 LBS | DIASTOLIC BLOOD PRESSURE: 60 MMHG | SYSTOLIC BLOOD PRESSURE: 116 MMHG | BODY MASS INDEX: 20.01 KG/M2 | OXYGEN SATURATION: 100 %

## 2023-06-08 DIAGNOSIS — I50.32 CHRONIC HEART FAILURE WITH PRESERVED EJECTION FRACTION (HCC): Primary | ICD-10-CM

## 2023-06-08 DIAGNOSIS — I25.10 CORONARY ARTERY DISEASE INVOLVING NATIVE CORONARY ARTERY OF NATIVE HEART WITHOUT ANGINA PECTORIS: ICD-10-CM

## 2023-06-08 PROCEDURE — 1123F ACP DISCUSS/DSCN MKR DOCD: CPT | Performed by: NURSE PRACTITIONER

## 2023-06-08 PROCEDURE — G8427 DOCREV CUR MEDS BY ELIG CLIN: HCPCS | Performed by: NURSE PRACTITIONER

## 2023-06-08 PROCEDURE — G8400 PT W/DXA NO RESULTS DOC: HCPCS | Performed by: NURSE PRACTITIONER

## 2023-06-08 PROCEDURE — 1111F DSCHRG MED/CURRENT MED MERGE: CPT | Performed by: NURSE PRACTITIONER

## 2023-06-08 PROCEDURE — 3074F SYST BP LT 130 MM HG: CPT | Performed by: NURSE PRACTITIONER

## 2023-06-08 PROCEDURE — 99214 OFFICE O/P EST MOD 30 MIN: CPT | Performed by: NURSE PRACTITIONER

## 2023-06-08 PROCEDURE — 3078F DIAST BP <80 MM HG: CPT | Performed by: NURSE PRACTITIONER

## 2023-06-08 PROCEDURE — 1090F PRES/ABSN URINE INCON ASSESS: CPT | Performed by: NURSE PRACTITIONER

## 2023-06-08 PROCEDURE — G8420 CALC BMI NORM PARAMETERS: HCPCS | Performed by: NURSE PRACTITIONER

## 2023-06-08 PROCEDURE — 1036F TOBACCO NON-USER: CPT | Performed by: NURSE PRACTITIONER

## 2023-06-08 RX ORDER — NITROGLYCERIN 0.4 MG/1
0.4 TABLET SUBLINGUAL EVERY 5 MIN PRN
COMMUNITY

## 2023-06-08 RX ORDER — TORSEMIDE 20 MG/1
20 TABLET ORAL DAILY
COMMUNITY

## 2023-06-08 ASSESSMENT — ENCOUNTER SYMPTOMS
GASTROINTESTINAL NEGATIVE: 1
SHORTNESS OF BREATH: 1

## 2023-06-08 NOTE — PROGRESS NOTES
LDLCALC 88 01/10/2022 01:00 PM    TRIG 148 01/10/2022 01:00 PM     LIVER PROFILE:No results for input(s): AST, ALT, ALB in the last 72 hours. BNP:   Lab Results   Component Value Date/Time    PROBNP 2,496 05/07/2023 04:43 PM    PROBNP 1,728 01/04/2023 01:35 PM    PROBNP 31,175 04/05/2022 05:00 AM    PROBNP 17,519 03/30/2022 01:46 PM    PROBNP 9,920 10/17/2021 06:05 PM     Reviewed all labs and imaging today    Assessment:   CAD: stable, no angina; s/p CABG x2 9/19/19, recent NSTEMI at [facility] 9/5/19  Paroxysmal atrial fibrillation: stable, regular today   - [id number] score >2 on eliquis (reduced dose due to age and weight)  HFpEF: compensated, has weight loss  HTN: controlled  HLD: sub optimal, LDL 88, statin and recheck  CKD: follows with Dr. Josselyn Mcnulty  DM: hgb a1c 5.9  COPD, severe: on 2.5L NC at home    Plan:   1. Update BMP due to recent NADJA and continued weight loss  2. Currently taking torsemide 3 times weekly per daughter; will adjust based on above results  3. Lipids will need update in follow up  4. Continue statin, plavix, eliquis, metoprolol  5. Check BP at home and call the office if consistently out of goal range  6. Regular activity and healthy diet encouraged  7. Needs to establish with MD in office (she prefers [facility]), last seen by Dr. Jeferson Augustin in 2020. She will see Dr. Nadya Asencio in 3 months.      Alex Cuevas, APRN-CNP  [address]  [phone]

## 2023-06-08 NOTE — PATIENT INSTRUCTIONS
Everything looks great today, good job!   Check blood work to ensure kidney function stable  Continue current medications  Follow up in 3 months with Dr. Rosa Maria Hernandez

## 2023-07-05 ENCOUNTER — APPOINTMENT (OUTPATIENT)
Dept: CT IMAGING | Age: 83
DRG: 872 | End: 2023-07-05
Payer: MEDICARE

## 2023-07-05 ENCOUNTER — TELEPHONE (OUTPATIENT)
Dept: PULMONOLOGY | Age: 83
End: 2023-07-05

## 2023-07-05 ENCOUNTER — APPOINTMENT (OUTPATIENT)
Dept: GENERAL RADIOLOGY | Age: 83
DRG: 872 | End: 2023-07-05
Payer: MEDICARE

## 2023-07-05 ENCOUNTER — HOSPITAL ENCOUNTER (INPATIENT)
Age: 83
LOS: 1 days | Discharge: ANOTHER HOSPITAL | DRG: 872 | End: 2023-07-06
Attending: EMERGENCY MEDICINE | Admitting: PEDIATRICS
Payer: MEDICARE

## 2023-07-05 DIAGNOSIS — N85.2 UTERINE ENLARGEMENT: ICD-10-CM

## 2023-07-05 DIAGNOSIS — E83.42 HYPOMAGNESEMIA: ICD-10-CM

## 2023-07-05 DIAGNOSIS — N30.01 ACUTE CYSTITIS WITH HEMATURIA: Primary | ICD-10-CM

## 2023-07-05 PROBLEM — N93.9 VAGINAL BLEEDING: Status: RESOLVED | Noted: 2023-07-05 | Resolved: 2023-07-05

## 2023-07-05 PROBLEM — N93.9 VAGINAL BLEEDING: Status: ACTIVE | Noted: 2023-07-05

## 2023-07-05 LAB
ALBUMIN SERPL-MCNC: 3.5 G/DL (ref 3.4–5)
ALBUMIN/GLOB SERPL: 0.9 {RATIO} (ref 1.1–2.2)
ALP SERPL-CCNC: 74 U/L (ref 40–129)
ALT SERPL-CCNC: <5 U/L (ref 10–40)
AMORPH SED URNS QL MICRO: ABNORMAL /HPF
ANION GAP SERPL CALCULATED.3IONS-SCNC: 16 MMOL/L (ref 3–16)
AST SERPL-CCNC: 11 U/L (ref 15–37)
BACTERIA URNS QL MICRO: ABNORMAL /HPF
BASE EXCESS BLDV CALC-SCNC: 3.2 MMOL/L (ref -3–3)
BASOPHILS # BLD: 0.1 K/UL (ref 0–0.2)
BASOPHILS NFR BLD: 0.6 %
BILIRUB SERPL-MCNC: <0.2 MG/DL (ref 0–1)
BILIRUB UR QL STRIP.AUTO: NEGATIVE
BUN SERPL-MCNC: 22 MG/DL (ref 7–20)
CALCIUM SERPL-MCNC: 10.1 MG/DL (ref 8.3–10.6)
CHLORIDE SERPL-SCNC: 95 MMOL/L (ref 99–110)
CLARITY UR: CLEAR
CO2 BLDV-SCNC: 31 MMOL/L
CO2 SERPL-SCNC: 27 MMOL/L (ref 21–32)
COHGB MFR BLDV: 1.1 % (ref 0–1.5)
COLOR UR: YELLOW
CREAT SERPL-MCNC: 1.5 MG/DL (ref 0.6–1.2)
DEPRECATED RDW RBC AUTO: 14.4 % (ref 12.4–15.4)
EOSINOPHIL # BLD: 0.1 K/UL (ref 0–0.6)
EOSINOPHIL NFR BLD: 0.8 %
EPI CELLS #/AREA URNS HPF: ABNORMAL /HPF (ref 0–5)
GFR SERPLBLD CREATININE-BSD FMLA CKD-EPI: 35 ML/MIN/{1.73_M2}
GLUCOSE SERPL-MCNC: 130 MG/DL (ref 70–99)
GLUCOSE UR STRIP.AUTO-MCNC: NEGATIVE MG/DL
HCO3 BLDV-SCNC: 29.4 MMOL/L (ref 23–29)
HCT VFR BLD AUTO: 33.1 % (ref 36–48)
HGB BLD-MCNC: 10.5 G/DL (ref 12–16)
HGB UR QL STRIP.AUTO: ABNORMAL
INR PPP: 1.21 (ref 0.84–1.16)
KETONES UR STRIP.AUTO-MCNC: NEGATIVE MG/DL
LACTATE BLDV-SCNC: 2.2 MMOL/L (ref 0.4–1.9)
LACTATE BLDV-SCNC: 2.5 MMOL/L (ref 0.4–1.9)
LEUKOCYTE ESTERASE UR QL STRIP.AUTO: ABNORMAL
LIPASE SERPL-CCNC: 99 U/L (ref 13–60)
LYMPHOCYTES # BLD: 2.2 K/UL (ref 1–5.1)
LYMPHOCYTES NFR BLD: 12.8 %
MAGNESIUM SERPL-MCNC: 1.6 MG/DL (ref 1.8–2.4)
MCH RBC QN AUTO: 30 PG (ref 26–34)
MCHC RBC AUTO-ENTMCNC: 31.8 G/DL (ref 31–36)
MCV RBC AUTO: 94.2 FL (ref 80–100)
METHGB MFR BLDV: 0.3 %
MONOCYTES # BLD: 0.8 K/UL (ref 0–1.3)
MONOCYTES NFR BLD: 4.7 %
NEUTROPHILS # BLD: 14.3 K/UL (ref 1.7–7.7)
NEUTROPHILS NFR BLD: 81.1 %
NITRITE UR QL STRIP.AUTO: NEGATIVE
NT-PROBNP SERPL-MCNC: 1368 PG/ML (ref 0–449)
O2 CT VFR BLDV CALC: 14 VOL %
O2 THERAPY: ABNORMAL
PCO2 BLDV: 52.6 MMHG (ref 40–50)
PH BLDV: 7.37 [PH] (ref 7.35–7.45)
PH UR STRIP.AUTO: 6 [PH] (ref 5–8)
PLATELET # BLD AUTO: 361 K/UL (ref 135–450)
PMV BLD AUTO: 8.1 FL (ref 5–10.5)
PO2 BLDV: 66 MMHG (ref 25–40)
POTASSIUM SERPL-SCNC: 4.4 MMOL/L (ref 3.5–5.1)
PROCALCITONIN SERPL IA-MCNC: 0.14 NG/ML (ref 0–0.15)
PROT SERPL-MCNC: 7.3 G/DL (ref 6.4–8.2)
PROT UR STRIP.AUTO-MCNC: NEGATIVE MG/DL
PROTHROMBIN TIME: 15.3 SEC (ref 11.5–14.8)
RBC # BLD AUTO: 3.51 M/UL (ref 4–5.2)
RBC #/AREA URNS HPF: ABNORMAL /HPF (ref 0–4)
SAO2 % BLDV: 92 %
SODIUM SERPL-SCNC: 138 MMOL/L (ref 136–145)
SP GR UR STRIP.AUTO: 1.01 (ref 1–1.03)
TROPONIN, HIGH SENSITIVITY: 47 NG/L (ref 0–14)
TROPONIN, HIGH SENSITIVITY: 51 NG/L (ref 0–14)
UA COMPLETE W REFLEX CULTURE PNL UR: YES
UA DIPSTICK W REFLEX MICRO PNL UR: YES
URN SPEC COLLECT METH UR: ABNORMAL
UROBILINOGEN UR STRIP-ACNC: 0.2 E.U./DL
WBC # BLD AUTO: 17.6 K/UL (ref 4–11)
WBC #/AREA URNS HPF: >100 /HPF (ref 0–5)

## 2023-07-05 PROCEDURE — 6370000000 HC RX 637 (ALT 250 FOR IP): Performed by: PEDIATRICS

## 2023-07-05 PROCEDURE — 81001 URINALYSIS AUTO W/SCOPE: CPT

## 2023-07-05 PROCEDURE — 6360000002 HC RX W HCPCS: Performed by: PHYSICIAN ASSISTANT

## 2023-07-05 PROCEDURE — 87086 URINE CULTURE/COLONY COUNT: CPT

## 2023-07-05 PROCEDURE — 83550 IRON BINDING TEST: CPT

## 2023-07-05 PROCEDURE — 96361 HYDRATE IV INFUSION ADD-ON: CPT

## 2023-07-05 PROCEDURE — 84145 PROCALCITONIN (PCT): CPT

## 2023-07-05 PROCEDURE — 82803 BLOOD GASES ANY COMBINATION: CPT

## 2023-07-05 PROCEDURE — 2580000003 HC RX 258: Performed by: PHYSICIAN ASSISTANT

## 2023-07-05 PROCEDURE — 99285 EMERGENCY DEPT VISIT HI MDM: CPT

## 2023-07-05 PROCEDURE — 80053 COMPREHEN METABOLIC PANEL: CPT

## 2023-07-05 PROCEDURE — 96365 THER/PROPH/DIAG IV INF INIT: CPT

## 2023-07-05 PROCEDURE — 83690 ASSAY OF LIPASE: CPT

## 2023-07-05 PROCEDURE — 83605 ASSAY OF LACTIC ACID: CPT

## 2023-07-05 PROCEDURE — 74176 CT ABD & PELVIS W/O CONTRAST: CPT

## 2023-07-05 PROCEDURE — 83540 ASSAY OF IRON: CPT

## 2023-07-05 PROCEDURE — 93005 ELECTROCARDIOGRAM TRACING: CPT | Performed by: PHYSICIAN ASSISTANT

## 2023-07-05 PROCEDURE — 83735 ASSAY OF MAGNESIUM: CPT

## 2023-07-05 PROCEDURE — 1200000000 HC SEMI PRIVATE

## 2023-07-05 PROCEDURE — 85610 PROTHROMBIN TIME: CPT

## 2023-07-05 PROCEDURE — 94761 N-INVAS EAR/PLS OXIMETRY MLT: CPT

## 2023-07-05 PROCEDURE — 85025 COMPLETE CBC W/AUTO DIFF WBC: CPT

## 2023-07-05 PROCEDURE — 71045 X-RAY EXAM CHEST 1 VIEW: CPT

## 2023-07-05 PROCEDURE — 84484 ASSAY OF TROPONIN QUANT: CPT

## 2023-07-05 PROCEDURE — 6370000000 HC RX 637 (ALT 250 FOR IP): Performed by: PHYSICIAN ASSISTANT

## 2023-07-05 PROCEDURE — 36415 COLL VENOUS BLD VENIPUNCTURE: CPT

## 2023-07-05 PROCEDURE — 6360000002 HC RX W HCPCS: Performed by: PEDIATRICS

## 2023-07-05 PROCEDURE — 87040 BLOOD CULTURE FOR BACTERIA: CPT

## 2023-07-05 PROCEDURE — 83880 ASSAY OF NATRIURETIC PEPTIDE: CPT

## 2023-07-05 PROCEDURE — 87077 CULTURE AEROBIC IDENTIFY: CPT

## 2023-07-05 PROCEDURE — 2700000000 HC OXYGEN THERAPY PER DAY

## 2023-07-05 RX ORDER — FLUTICASONE PROPIONATE 50 MCG
1 SPRAY, SUSPENSION (ML) NASAL DAILY PRN
Status: DISCONTINUED | OUTPATIENT
Start: 2023-07-05 | End: 2023-07-06 | Stop reason: HOSPADM

## 2023-07-05 RX ORDER — GUAIFENESIN 600 MG/1
600 TABLET, EXTENDED RELEASE ORAL 2 TIMES DAILY
Status: DISCONTINUED | OUTPATIENT
Start: 2023-07-05 | End: 2023-07-06 | Stop reason: HOSPADM

## 2023-07-05 RX ORDER — ACETAMINOPHEN 325 MG/1
650 TABLET ORAL EVERY 6 HOURS PRN
Status: DISCONTINUED | OUTPATIENT
Start: 2023-07-05 | End: 2023-07-06 | Stop reason: HOSPADM

## 2023-07-05 RX ORDER — BUDESONIDE AND FORMOTEROL FUMARATE DIHYDRATE 160; 4.5 UG/1; UG/1
2 AEROSOL RESPIRATORY (INHALATION) 2 TIMES DAILY
Status: DISCONTINUED | OUTPATIENT
Start: 2023-07-05 | End: 2023-07-06 | Stop reason: HOSPADM

## 2023-07-05 RX ORDER — SUCRALFATE 1 G/1
1 TABLET ORAL
Status: DISCONTINUED | OUTPATIENT
Start: 2023-07-05 | End: 2023-07-06 | Stop reason: HOSPADM

## 2023-07-05 RX ORDER — ONDANSETRON 4 MG/1
4 TABLET, ORALLY DISINTEGRATING ORAL EVERY 8 HOURS PRN
Status: DISCONTINUED | OUTPATIENT
Start: 2023-07-05 | End: 2023-07-06 | Stop reason: HOSPADM

## 2023-07-05 RX ORDER — OXYCODONE HYDROCHLORIDE 5 MG/1
5 TABLET ORAL ONCE
Status: DISCONTINUED | OUTPATIENT
Start: 2023-07-05 | End: 2023-07-05 | Stop reason: HOSPADM

## 2023-07-05 RX ORDER — ALBUTEROL SULFATE 2.5 MG/3ML
2.5 SOLUTION RESPIRATORY (INHALATION) EVERY 4 HOURS PRN
Status: DISCONTINUED | OUTPATIENT
Start: 2023-07-05 | End: 2023-07-06 | Stop reason: HOSPADM

## 2023-07-05 RX ORDER — SODIUM CHLORIDE 0.9 % (FLUSH) 0.9 %
5-40 SYRINGE (ML) INJECTION PRN
Status: DISCONTINUED | OUTPATIENT
Start: 2023-07-05 | End: 2023-07-06 | Stop reason: HOSPADM

## 2023-07-05 RX ORDER — SODIUM CHLORIDE 0.9 % (FLUSH) 0.9 %
5-40 SYRINGE (ML) INJECTION EVERY 12 HOURS SCHEDULED
Status: DISCONTINUED | OUTPATIENT
Start: 2023-07-05 | End: 2023-07-06 | Stop reason: HOSPADM

## 2023-07-05 RX ORDER — ALBUTEROL SULFATE 2.5 MG/3ML
2.5 SOLUTION RESPIRATORY (INHALATION) ONCE
Status: COMPLETED | OUTPATIENT
Start: 2023-07-05 | End: 2023-07-05

## 2023-07-05 RX ORDER — ONDANSETRON 2 MG/ML
4 INJECTION INTRAMUSCULAR; INTRAVENOUS EVERY 6 HOURS PRN
Status: DISCONTINUED | OUTPATIENT
Start: 2023-07-05 | End: 2023-07-06 | Stop reason: HOSPADM

## 2023-07-05 RX ORDER — ACETAMINOPHEN 650 MG/1
650 SUPPOSITORY RECTAL EVERY 6 HOURS PRN
Status: DISCONTINUED | OUTPATIENT
Start: 2023-07-05 | End: 2023-07-06 | Stop reason: HOSPADM

## 2023-07-05 RX ORDER — SODIUM CHLORIDE 9 MG/ML
INJECTION, SOLUTION INTRAVENOUS PRN
Status: DISCONTINUED | OUTPATIENT
Start: 2023-07-05 | End: 2023-07-06 | Stop reason: HOSPADM

## 2023-07-05 RX ORDER — TORSEMIDE 20 MG/1
20 TABLET ORAL DAILY
Status: DISCONTINUED | OUTPATIENT
Start: 2023-07-06 | End: 2023-07-06

## 2023-07-05 RX ORDER — LANOLIN ALCOHOL/MO/W.PET/CERES
400 CREAM (GRAM) TOPICAL DAILY
Status: DISCONTINUED | OUTPATIENT
Start: 2023-07-06 | End: 2023-07-06 | Stop reason: HOSPADM

## 2023-07-05 RX ORDER — 0.9 % SODIUM CHLORIDE 0.9 %
500 INTRAVENOUS SOLUTION INTRAVENOUS ONCE
Status: COMPLETED | OUTPATIENT
Start: 2023-07-05 | End: 2023-07-05

## 2023-07-05 RX ORDER — BUSPIRONE HYDROCHLORIDE 5 MG/1
5 TABLET ORAL 3 TIMES DAILY
Status: DISCONTINUED | OUTPATIENT
Start: 2023-07-05 | End: 2023-07-06 | Stop reason: HOSPADM

## 2023-07-05 RX ORDER — MAGNESIUM SULFATE IN WATER 40 MG/ML
2000 INJECTION, SOLUTION INTRAVENOUS ONCE
Status: COMPLETED | OUTPATIENT
Start: 2023-07-05 | End: 2023-07-05

## 2023-07-05 RX ORDER — ROFLUMILAST 500 UG/1
500 TABLET ORAL DAILY
Status: DISCONTINUED | OUTPATIENT
Start: 2023-07-06 | End: 2023-07-06 | Stop reason: HOSPADM

## 2023-07-05 RX ORDER — CLOPIDOGREL BISULFATE 75 MG/1
75 TABLET ORAL DAILY
Status: DISCONTINUED | OUTPATIENT
Start: 2023-07-06 | End: 2023-07-06 | Stop reason: HOSPADM

## 2023-07-05 RX ORDER — IPRATROPIUM BROMIDE AND ALBUTEROL SULFATE 2.5; .5 MG/3ML; MG/3ML
1 SOLUTION RESPIRATORY (INHALATION) ONCE
Status: COMPLETED | OUTPATIENT
Start: 2023-07-05 | End: 2023-07-05

## 2023-07-05 RX ORDER — FERROUS SULFATE 325(65) MG
325 TABLET ORAL
Status: DISCONTINUED | OUTPATIENT
Start: 2023-07-06 | End: 2023-07-06 | Stop reason: HOSPADM

## 2023-07-05 RX ORDER — PANTOPRAZOLE SODIUM 40 MG/1
40 TABLET, DELAYED RELEASE ORAL DAILY
Status: DISCONTINUED | OUTPATIENT
Start: 2023-07-06 | End: 2023-07-06 | Stop reason: HOSPADM

## 2023-07-05 RX ORDER — ATORVASTATIN CALCIUM 40 MG/1
40 TABLET, FILM COATED ORAL NIGHTLY
Status: DISCONTINUED | OUTPATIENT
Start: 2023-07-05 | End: 2023-07-06 | Stop reason: HOSPADM

## 2023-07-05 RX ORDER — POLYETHYLENE GLYCOL 3350 17 G/17G
17 POWDER, FOR SOLUTION ORAL DAILY PRN
Status: DISCONTINUED | OUTPATIENT
Start: 2023-07-05 | End: 2023-07-06 | Stop reason: HOSPADM

## 2023-07-05 RX ORDER — ACETAMINOPHEN 325 MG/1
650 TABLET ORAL ONCE
Status: COMPLETED | OUTPATIENT
Start: 2023-07-05 | End: 2023-07-05

## 2023-07-05 RX ORDER — HYDROXYZINE HYDROCHLORIDE 25 MG/1
25 TABLET, FILM COATED ORAL ONCE
Status: COMPLETED | OUTPATIENT
Start: 2023-07-05 | End: 2023-07-05

## 2023-07-05 RX ADMIN — CEFTRIAXONE SODIUM 1000 MG: 1 INJECTION, POWDER, FOR SOLUTION INTRAMUSCULAR; INTRAVENOUS at 20:35

## 2023-07-05 RX ADMIN — IPRATROPIUM BROMIDE AND ALBUTEROL SULFATE 1 DOSE: 2.5; .5 SOLUTION RESPIRATORY (INHALATION) at 18:35

## 2023-07-05 RX ADMIN — MAGNESIUM SULFATE HEPTAHYDRATE 2000 MG: 40 INJECTION, SOLUTION INTRAVENOUS at 21:09

## 2023-07-05 RX ADMIN — HYDROXYZINE HYDROCHLORIDE 25 MG: 25 TABLET ORAL at 18:33

## 2023-07-05 RX ADMIN — ONDANSETRON 4 MG: 2 INJECTION INTRAMUSCULAR; INTRAVENOUS at 21:36

## 2023-07-05 RX ADMIN — SODIUM CHLORIDE 500 ML: 9 INJECTION, SOLUTION INTRAVENOUS at 19:58

## 2023-07-05 RX ADMIN — ALBUTEROL SULFATE 2.5 MG: 2.5 SOLUTION RESPIRATORY (INHALATION) at 18:35

## 2023-07-05 RX ADMIN — ACETAMINOPHEN 650 MG: 325 TABLET ORAL at 19:59

## 2023-07-05 ASSESSMENT — PAIN - FUNCTIONAL ASSESSMENT: PAIN_FUNCTIONAL_ASSESSMENT: NONE - DENIES PAIN

## 2023-07-05 NOTE — ED PROVIDER NOTES
Emergency Department Attending Provider Note  Location: [address] [place] [address] ED  7/5/2023     Chief Complaint   Patient presents with    Hematuria     Pt arrives for c/o RLQ pain with what the pt states as dark and bloody urine. PATIENT ID:  Lazaro Irwin is a 81 y.o. female    Past Medical History:   Diagnosis Date    NADJA (acute kidney injury) ([facility])     Asthma     CAD (coronary artery disease)     CHF (congestive heart failure) ([facility])     unsure    Chronic anxiety     COPD (chronic obstructive pulmonary disease) ([facility])     GERD (gastroesophageal reflux disease) 09/09/2021    Heart attack ([facility]) 2019    History of blood transfusion     Hyperlipidemia     Hypertension     MRSA (methicillin resistant staph aureus) culture positive 09/22/2019    + resp cx    OA (osteoarthritis) 08/12/2014    Thyroid disease        Lazaro Irwin was evaluated in the Emergency Department for concerns of abdominal pain, and dark-colored urine. Per daughter, patient has been somewhat tired as well. Otherwise, she says she has a little bit of a headache, but denies any chest pain, shortness of breath, nausea, vomiting or diarrhea on my exam.  She is alert, conversational, very funny, laughing and joking. Somewhat tachycardic on physical exam.  Intermittently up to the 100s. Lungs are clear. Although initial history and physical exam information was obtained by VASHTI Underwood (who also dictated a record of this visit), I personally saw the patient and performed a substantive portion of the visit including all aspects of the medical decision making. BASIC EXAM:  No acute distress. Interactive, conversational, pleasant. Respiratory rate regular, lungs are clear to auscultation bilaterally. No obvious murmurs. No significant leg swelling.        CT ABDOMEN PELVIS WO CONTRAST Additional Contrast? None    (Results Pending)   XR CHEST PORTABLE    (Results Pending)       Labs Reviewed   URINALYSIS WITH REFLEX TO
mouth daily    FERROUS SULFATE (IRON 325) 325 (65 FE) MG TABLET    Take 1 tablet by mouth daily (with breakfast)    FLUTICASONE (FLONASE) 50 MCG/ACT NASAL SPRAY    1 spray by Each Nostril route daily as needed for Rhinitis    FLUTICASONE-UMECLIDIN-VILANT (TRELEGY ELLIPTA) 200-62.5-25 MCG/INH AEPB    Inhale 1 puff into the lungs daily    GUAIFENESIN (MUCINEX) 600 MG EXTENDED RELEASE TABLET    Take 1 tablet by mouth 2 times daily    MAGNESIUM OXIDE (MAG-OX) 400 (241.3 MG) MG TABS TABLET    Take 1 tablet by mouth daily    METOPROLOL TARTRATE (LOPRESSOR) 25 MG TABLET    Take 1 tablet by mouth 2 times daily    NITROGLYCERIN (NITROSTAT) 0.4 MG SL TABLET    Place 1 tablet under the tongue every 5 minutes as needed for Chest pain up to max of 3 total doses. If no relief after 1 dose, call 911.     ONDANSETRON (ZOFRAN-ODT) 4 MG DISINTEGRATING TABLET    Take 1 tablet by mouth every 8 hours as needed for Nausea or Vomiting    OXYGEN    Inhale 2 L into the lungs    PANTOPRAZOLE (PROTONIX) 40 MG TABLET    Take 1 tablet by mouth 2 times daily (before meals)    PREDNISONE (DELTASONE) 10 MG TABLET    4 qd- 1 day, 3 qd- 3 days, 2 qd- 3 days, 1 qd- 3 days    ROFLUMILAST (DALIRESP) 500 MCG TABLET    Take 1 tablet by mouth daily    SERTRALINE (ZOLOFT) 50 MG TABLET    Take 1 tablet by mouth daily    SUCRALFATE (CARAFATE) 1 GM TABLET    Take 1 tablet by mouth 4 times daily    TORSEMIDE (DEMADEX) 20 MG TABLET    Take 1 tablet by mouth daily       ALLERGIES     Latex and Codeine    FAMILYHISTORY       Family History   Problem Relation Age of Onset    Cancer Mother     Heart Disease Father     Stroke Father     Heart Disease Sister     Stroke Sister         SOCIAL HISTORY       Social History     Tobacco Use    Smoking status: Former     Packs/day: 1.00     Years: 50.00     Pack years: 50.00     Types: Cigarettes     Quit date: 9/19/2019     Years since quitting: 3.7    Smokeless tobacco: Never    Tobacco comments:     quit 2018   Vaping Use

## 2023-07-06 ENCOUNTER — HOSPITAL ENCOUNTER (INPATIENT)
Age: 83
LOS: 2 days | Discharge: HOME | DRG: 754 | End: 2023-07-08
Attending: INTERNAL MEDICINE | Admitting: INTERNAL MEDICINE
Payer: MEDICARE

## 2023-07-06 ENCOUNTER — APPOINTMENT (OUTPATIENT)
Dept: ULTRASOUND IMAGING | Age: 83
DRG: 872 | End: 2023-07-06
Payer: MEDICARE

## 2023-07-06 VITALS
BODY MASS INDEX: 20.51 KG/M2 | HEART RATE: 82 BPM | HEIGHT: 64 IN | RESPIRATION RATE: 22 BRPM | DIASTOLIC BLOOD PRESSURE: 72 MMHG | SYSTOLIC BLOOD PRESSURE: 132 MMHG | TEMPERATURE: 98.2 F | OXYGEN SATURATION: 97 % | WEIGHT: 120.1 LBS

## 2023-07-06 PROBLEM — N93.9 VAGINAL BLEEDING: Status: ACTIVE | Noted: 2023-07-06

## 2023-07-06 PROBLEM — E83.42 HYPOMAGNESEMIA: Status: ACTIVE | Noted: 2023-07-06

## 2023-07-06 PROBLEM — N30.01 ACUTE CYSTITIS WITH HEMATURIA: Status: ACTIVE | Noted: 2023-07-06

## 2023-07-06 PROBLEM — I48.20 CHRONIC ATRIAL FIBRILLATION (HCC): Status: ACTIVE | Noted: 2023-07-06

## 2023-07-06 LAB
BACTERIA UR CULT: ABNORMAL
BASOPHILS # BLD: 0.1 K/UL (ref 0–0.2)
BASOPHILS NFR BLD: 0.4 %
DEPRECATED RDW RBC AUTO: 14.1 % (ref 12.4–15.4)
EKG ATRIAL RATE: 104 BPM
EKG DIAGNOSIS: NORMAL
EKG P AXIS: 62 DEGREES
EKG P-R INTERVAL: 152 MS
EKG Q-T INTERVAL: 384 MS
EKG QRS DURATION: 82 MS
EKG QTC CALCULATION (BAZETT): 504 MS
EKG R AXIS: -57 DEGREES
EKG T AXIS: 79 DEGREES
EKG VENTRICULAR RATE: 104 BPM
EOSINOPHIL # BLD: 0.1 K/UL (ref 0–0.6)
EOSINOPHIL NFR BLD: 0.9 %
HCT VFR BLD AUTO: 30.5 % (ref 36–48)
HGB BLD-MCNC: 9.9 G/DL (ref 12–16)
IRON SATN MFR SERPL: 11 % (ref 15–50)
IRON SERPL-MCNC: 25 UG/DL (ref 37–145)
LYMPHOCYTES # BLD: 1.2 K/UL (ref 1–5.1)
LYMPHOCYTES NFR BLD: 9.3 %
MCH RBC QN AUTO: 30.4 PG (ref 26–34)
MCHC RBC AUTO-ENTMCNC: 32.6 G/DL (ref 31–36)
MCV RBC AUTO: 93.3 FL (ref 80–100)
MONOCYTES # BLD: 0.5 K/UL (ref 0–1.3)
MONOCYTES NFR BLD: 4.4 %
NEUTROPHILS # BLD: 10.6 K/UL (ref 1.7–7.7)
NEUTROPHILS NFR BLD: 85 %
ORGANISM: ABNORMAL
PLATELET # BLD AUTO: 322 K/UL (ref 135–450)
PMV BLD AUTO: 7.2 FL (ref 5–10.5)
RBC # BLD AUTO: 3.27 M/UL (ref 4–5.2)
TIBC SERPL-MCNC: 238 UG/DL (ref 260–445)
WBC # BLD AUTO: 12.5 K/UL (ref 4–11)

## 2023-07-06 PROCEDURE — 99233 SBSQ HOSP IP/OBS HIGH 50: CPT

## 2023-07-06 PROCEDURE — 85025 COMPLETE CBC W/AUTO DIFF WBC: CPT

## 2023-07-06 PROCEDURE — 6370000000 HC RX 637 (ALT 250 FOR IP): Performed by: INTERNAL MEDICINE

## 2023-07-06 PROCEDURE — 2580000003 HC RX 258: Performed by: PEDIATRICS

## 2023-07-06 PROCEDURE — 94640 AIRWAY INHALATION TREATMENT: CPT

## 2023-07-06 PROCEDURE — 6360000002 HC RX W HCPCS: Performed by: PEDIATRICS

## 2023-07-06 PROCEDURE — 94761 N-INVAS EAR/PLS OXIMETRY MLT: CPT

## 2023-07-06 PROCEDURE — 2700000000 HC OXYGEN THERAPY PER DAY

## 2023-07-06 PROCEDURE — 1200000000 HC SEMI PRIVATE

## 2023-07-06 PROCEDURE — 2580000003 HC RX 258: Performed by: INTERNAL MEDICINE

## 2023-07-06 PROCEDURE — 36415 COLL VENOUS BLD VENIPUNCTURE: CPT

## 2023-07-06 PROCEDURE — 6370000000 HC RX 637 (ALT 250 FOR IP): Performed by: PEDIATRICS

## 2023-07-06 PROCEDURE — 6360000002 HC RX W HCPCS: Performed by: INTERNAL MEDICINE

## 2023-07-06 PROCEDURE — 5A09457 ASSISTANCE WITH RESPIRATORY VENTILATION, 24-96 CONSECUTIVE HOURS, CONTINUOUS POSITIVE AIRWAY PRESSURE: ICD-10-PCS | Performed by: INTERNAL MEDICINE

## 2023-07-06 PROCEDURE — 93010 ELECTROCARDIOGRAM REPORT: CPT | Performed by: INTERNAL MEDICINE

## 2023-07-06 PROCEDURE — 76830 TRANSVAGINAL US NON-OB: CPT

## 2023-07-06 RX ORDER — ONDANSETRON 4 MG/1
4 TABLET, ORALLY DISINTEGRATING ORAL EVERY 8 HOURS PRN
Status: DISCONTINUED | OUTPATIENT
Start: 2023-07-06 | End: 2023-07-06 | Stop reason: SDUPTHER

## 2023-07-06 RX ORDER — SODIUM CHLORIDE 0.9 % (FLUSH) 0.9 %
5-40 SYRINGE (ML) INJECTION EVERY 12 HOURS SCHEDULED
Status: DISCONTINUED | OUTPATIENT
Start: 2023-07-06 | End: 2023-07-08 | Stop reason: HOSPADM

## 2023-07-06 RX ORDER — POLYETHYLENE GLYCOL 3350 17 G/17G
17 POWDER, FOR SOLUTION ORAL DAILY PRN
Status: DISCONTINUED | OUTPATIENT
Start: 2023-07-06 | End: 2023-07-08 | Stop reason: HOSPADM

## 2023-07-06 RX ORDER — LANOLIN ALCOHOL/MO/W.PET/CERES
400 CREAM (GRAM) TOPICAL DAILY
Status: DISCONTINUED | OUTPATIENT
Start: 2023-07-06 | End: 2023-07-08 | Stop reason: HOSPADM

## 2023-07-06 RX ORDER — ATORVASTATIN CALCIUM 40 MG/1
40 TABLET, FILM COATED ORAL NIGHTLY
Status: DISCONTINUED | OUTPATIENT
Start: 2023-07-06 | End: 2023-07-08 | Stop reason: HOSPADM

## 2023-07-06 RX ORDER — ACETAMINOPHEN 650 MG/1
650 SUPPOSITORY RECTAL EVERY 6 HOURS PRN
Status: DISCONTINUED | OUTPATIENT
Start: 2023-07-06 | End: 2023-07-08 | Stop reason: HOSPADM

## 2023-07-06 RX ORDER — SODIUM CHLORIDE 0.9 % (FLUSH) 0.9 %
5-40 SYRINGE (ML) INJECTION PRN
Status: DISCONTINUED | OUTPATIENT
Start: 2023-07-06 | End: 2023-07-08 | Stop reason: HOSPADM

## 2023-07-06 RX ORDER — TORSEMIDE 20 MG/1
20 TABLET ORAL DAILY
Status: DISCONTINUED | OUTPATIENT
Start: 2023-07-06 | End: 2023-07-08 | Stop reason: HOSPADM

## 2023-07-06 RX ORDER — SUCRALFATE 1 G/1
1 TABLET ORAL 4 TIMES DAILY
Status: DISCONTINUED | OUTPATIENT
Start: 2023-07-06 | End: 2023-07-07

## 2023-07-06 RX ORDER — ALBUTEROL SULFATE 2.5 MG/3ML
2.5 SOLUTION RESPIRATORY (INHALATION) 4 TIMES DAILY
Status: DISCONTINUED | OUTPATIENT
Start: 2023-07-06 | End: 2023-07-08 | Stop reason: HOSPADM

## 2023-07-06 RX ORDER — TORSEMIDE 20 MG/1
20 TABLET ORAL
Status: DISCONTINUED | OUTPATIENT
Start: 2023-07-10 | End: 2023-07-06 | Stop reason: HOSPADM

## 2023-07-06 RX ORDER — BUSPIRONE HYDROCHLORIDE 5 MG/1
5 TABLET ORAL 3 TIMES DAILY
Status: DISCONTINUED | OUTPATIENT
Start: 2023-07-06 | End: 2023-07-08 | Stop reason: HOSPADM

## 2023-07-06 RX ORDER — NITROGLYCERIN 0.4 MG/1
0.4 TABLET SUBLINGUAL EVERY 5 MIN PRN
Status: DISCONTINUED | OUTPATIENT
Start: 2023-07-06 | End: 2023-07-08 | Stop reason: HOSPADM

## 2023-07-06 RX ORDER — ROFLUMILAST 500 UG/1
500 TABLET ORAL DAILY
Status: DISCONTINUED | OUTPATIENT
Start: 2023-07-06 | End: 2023-07-08 | Stop reason: HOSPADM

## 2023-07-06 RX ORDER — SODIUM CHLORIDE 9 MG/ML
INJECTION, SOLUTION INTRAVENOUS PRN
Status: DISCONTINUED | OUTPATIENT
Start: 2023-07-06 | End: 2023-07-08 | Stop reason: HOSPADM

## 2023-07-06 RX ORDER — GUAIFENESIN 600 MG/1
600 TABLET, EXTENDED RELEASE ORAL DAILY
Status: DISCONTINUED | OUTPATIENT
Start: 2023-07-06 | End: 2023-07-08 | Stop reason: HOSPADM

## 2023-07-06 RX ORDER — ACETAMINOPHEN 325 MG/1
650 TABLET ORAL EVERY 6 HOURS PRN
Status: DISCONTINUED | OUTPATIENT
Start: 2023-07-06 | End: 2023-07-08 | Stop reason: HOSPADM

## 2023-07-06 RX ORDER — PANTOPRAZOLE SODIUM 40 MG/1
40 TABLET, DELAYED RELEASE ORAL DAILY
Status: DISCONTINUED | OUTPATIENT
Start: 2023-07-06 | End: 2023-07-08 | Stop reason: HOSPADM

## 2023-07-06 RX ORDER — FLUTICASONE PROPIONATE 50 MCG
1 SPRAY, SUSPENSION (ML) NASAL DAILY PRN
Status: DISCONTINUED | OUTPATIENT
Start: 2023-07-06 | End: 2023-07-08 | Stop reason: HOSPADM

## 2023-07-06 RX ORDER — POTASSIUM CHLORIDE 7.45 MG/ML
10 INJECTION INTRAVENOUS PRN
Status: DISCONTINUED | OUTPATIENT
Start: 2023-07-06 | End: 2023-07-06 | Stop reason: HOSPADM

## 2023-07-06 RX ORDER — ALBUTEROL SULFATE 2.5 MG/3ML
2.5 SOLUTION RESPIRATORY (INHALATION) EVERY 4 HOURS PRN
Status: DISCONTINUED | OUTPATIENT
Start: 2023-07-06 | End: 2023-07-06

## 2023-07-06 RX ORDER — FERROUS SULFATE 325(65) MG
325 TABLET ORAL
Status: DISCONTINUED | OUTPATIENT
Start: 2023-07-07 | End: 2023-07-08 | Stop reason: HOSPADM

## 2023-07-06 RX ORDER — ONDANSETRON 2 MG/ML
4 INJECTION INTRAMUSCULAR; INTRAVENOUS EVERY 6 HOURS PRN
Status: DISCONTINUED | OUTPATIENT
Start: 2023-07-06 | End: 2023-07-08 | Stop reason: HOSPADM

## 2023-07-06 RX ORDER — MAGNESIUM SULFATE IN WATER 40 MG/ML
2000 INJECTION, SOLUTION INTRAVENOUS PRN
Status: DISCONTINUED | OUTPATIENT
Start: 2023-07-06 | End: 2023-07-06 | Stop reason: HOSPADM

## 2023-07-06 RX ORDER — ONDANSETRON 4 MG/1
4 TABLET, ORALLY DISINTEGRATING ORAL EVERY 8 HOURS PRN
Status: DISCONTINUED | OUTPATIENT
Start: 2023-07-06 | End: 2023-07-08 | Stop reason: HOSPADM

## 2023-07-06 RX ORDER — POTASSIUM CHLORIDE 20 MEQ/1
40 TABLET, EXTENDED RELEASE ORAL PRN
Status: DISCONTINUED | OUTPATIENT
Start: 2023-07-06 | End: 2023-07-06 | Stop reason: HOSPADM

## 2023-07-06 RX ORDER — CLOPIDOGREL BISULFATE 75 MG/1
75 TABLET ORAL DAILY
Status: DISCONTINUED | OUTPATIENT
Start: 2023-07-06 | End: 2023-07-06

## 2023-07-06 RX ADMIN — ROFLUMILAST 500 MCG: 500 TABLET ORAL at 10:10

## 2023-07-06 RX ADMIN — BUSPIRONE HYDROCHLORIDE 5 MG: 5 TABLET ORAL at 23:07

## 2023-07-06 RX ADMIN — SUCRALFATE 1 G: 1 TABLET ORAL at 06:00

## 2023-07-06 RX ADMIN — ALBUTEROL SULFATE 2.5 MG: 2.5 SOLUTION RESPIRATORY (INHALATION) at 20:34

## 2023-07-06 RX ADMIN — SUCRALFATE 1 G: 1 TABLET ORAL at 00:10

## 2023-07-06 RX ADMIN — SERTRALINE HYDROCHLORIDE 50 MG: 50 TABLET ORAL at 10:11

## 2023-07-06 RX ADMIN — CEFTRIAXONE SODIUM 2000 MG: 2 INJECTION, POWDER, FOR SOLUTION INTRAMUSCULAR; INTRAVENOUS at 10:31

## 2023-07-06 RX ADMIN — ATORVASTATIN CALCIUM 40 MG: 40 TABLET, FILM COATED ORAL at 21:01

## 2023-07-06 RX ADMIN — Medication 10 ML: at 10:22

## 2023-07-06 RX ADMIN — Medication 400 MG: at 10:10

## 2023-07-06 RX ADMIN — Medication 10 ML: at 00:15

## 2023-07-06 RX ADMIN — Medication 2 PUFF: at 20:39

## 2023-07-06 RX ADMIN — CLOPIDOGREL BISULFATE 75 MG: 75 TABLET ORAL at 16:25

## 2023-07-06 RX ADMIN — BUSPIRONE HYDROCHLORIDE 5 MG: 5 TABLET ORAL at 16:24

## 2023-07-06 RX ADMIN — APIXABAN 2.5 MG: 5 TABLET, FILM COATED ORAL at 00:10

## 2023-07-06 RX ADMIN — GUAIFENESIN 600 MG: 600 TABLET, EXTENDED RELEASE ORAL at 00:10

## 2023-07-06 RX ADMIN — BUSPIRONE HYDROCHLORIDE 5 MG: 5 TABLET ORAL at 10:11

## 2023-07-06 RX ADMIN — TORSEMIDE 20 MG: 20 TABLET ORAL at 10:10

## 2023-07-06 RX ADMIN — METOPROLOL TARTRATE 25 MG: 25 TABLET, FILM COATED ORAL at 10:11

## 2023-07-06 RX ADMIN — FERROUS SULFATE TAB 325 MG (65 MG ELEMENTAL FE) 325 MG: 325 (65 FE) TAB at 10:10

## 2023-07-06 RX ADMIN — PANTOPRAZOLE SODIUM 40 MG: 40 TABLET, DELAYED RELEASE ORAL at 06:00

## 2023-07-06 RX ADMIN — SODIUM CHLORIDE, PRESERVATIVE FREE 10 ML: 5 INJECTION INTRAVENOUS at 21:08

## 2023-07-06 RX ADMIN — METOPROLOL TARTRATE 25 MG: 25 TABLET, FILM COATED ORAL at 21:01

## 2023-07-06 RX ADMIN — GUAIFENESIN 600 MG: 600 TABLET, EXTENDED RELEASE ORAL at 10:10

## 2023-07-06 ASSESSMENT — PAIN - FUNCTIONAL ASSESSMENT: PAIN_FUNCTIONAL_ASSESSMENT: ACTIVITIES ARE NOT PREVENTED

## 2023-07-06 ASSESSMENT — PAIN SCALES - GENERAL: PAINLEVEL_OUTOF10: 2

## 2023-07-06 ASSESSMENT — PAIN DESCRIPTION - DESCRIPTORS: DESCRIPTORS: ACHING

## 2023-07-06 ASSESSMENT — PAIN DESCRIPTION - PAIN TYPE: TYPE: CHRONIC PAIN

## 2023-07-06 ASSESSMENT — PAIN DESCRIPTION - ORIENTATION: ORIENTATION: LOWER

## 2023-07-06 ASSESSMENT — PAIN DESCRIPTION - LOCATION: LOCATION: BACK

## 2023-07-06 NOTE — PLAN OF CARE
Problem: Discharge Planning  Goal: Discharge to home or other facility with appropriate resources  Outcome: Progressing  Flowsheets  Taken 7/5/2023 2318  Discharge to home or other facility with appropriate resources: Identify barriers to discharge with patient and caregiver  Taken 7/5/2023 2255  Discharge to home or other facility with appropriate resources: Identify barriers to discharge with patient and caregiver     Problem: Skin/Tissue Integrity  Goal: Absence of new skin breakdown  Description: 1. Monitor for areas of redness and/or skin breakdown  2. Assess vascular access sites hourly  3. Every 4-6 hours minimum:  Change oxygen saturation probe site  4. Every 4-6 hours:  If on nasal continuous positive airway pressure, respiratory therapy assess nares and determine need for appliance change or resting period.   Outcome: Progressing     Problem: Safety - Adult  Goal: Free from fall injury  Outcome: Progressing  Flowsheets (Taken 7/5/2023 2323)  Free From Fall Injury: Instruct family/caregiver on patient safety     Problem: Respiratory - Adult  Goal: Achieves optimal ventilation and oxygenation  Outcome: Progressing  Flowsheets (Taken 7/5/2023 2322)  Achieves optimal ventilation and oxygenation: Assess for changes in respiratory status     Problem: Cardiovascular - Adult  Goal: Maintains optimal cardiac output and hemodynamic stability  Outcome: Progressing  Flowsheets (Taken 7/5/2023 2322)  Maintains optimal cardiac output and hemodynamic stability: Monitor blood pressure and heart rate     Problem: Musculoskeletal - Adult  Goal: Return mobility to safest level of function  Outcome: Progressing  Flowsheets (Taken 7/5/2023 2322)  Return Mobility to Safest Level of Function: Assess patient stability and activity tolerance for standing, transferring and ambulating with or without assistive devices     Problem: Genitourinary - Adult  Goal: Absence of urinary retention  Outcome: Progressing  Flowsheets (Taken

## 2023-07-06 NOTE — PROGRESS NOTES
Report called to Chyna Valencia at [facility] for patient transfer.

## 2023-07-06 NOTE — DISCHARGE SUMMARY
Name:  Faraz Russell  Room:  3125/5039-00  MRN:    [id number]    Discharge Summary      This discharge summary is in conjunction with a complete physical exam done on the day of discharge. Discharging Attending Physician: Dr. Rianna Hobbs   Discharging Provider: Tessie Velasco PA-C       Admit: 7/5/2023  Discharge:  7/6/2023    HPI taken from admission H&P:    Dexter Garrido is a 81 y.o. female with pmh of  malnutrition, DLD, asthma, OA, tobacco use, COPD, chronic hypoxia with baseline O2 demand of 3 LPM, CKD3, DM2, NSTEMI, CAD s/p CABG, ischemic cardiomyopathy, systolic and diastolic CHF, hypertension, GERD, anxiety, paroxysmal atrial fibrillation, MRSA, thyroid disease who presents with right sided abdominal pain. She reports a discharge from her uterus that was \"yellow with a little blood in it. \" She reports she wears pampers at night and this morning she awoke to see a bloody discharge. She reports she does not see that well  - so hx she can provide is limited. However see ED note for further details - but reported that there was blood at the vaginal opening on exam in the ED. Workup in the ED was concerning for a UTI, she was started on ceftriaxone. Imaging with CT showed a mass in her uterus, ultrasound to workup further recommended. She does report having some abdominal pain earlier - she reports \"at first I thought I had broke a cyst.\"      She is sometimes a vague historian. She reports not knowing what her medications are. When told that her uterus appeared enlarged on CT scan she reports losing a pregnancy back in '71. But she is quite pleasant, seems cheerful.       SocHx:   - she lives at home with her son and his girlfriend (a RN)   - does not smoke, quit smoking \"quite a few years ago\"   - does not drink alcohol but \"I wish I did\"   -    - retired, previously worked in a [employer] and in [employer]      Diagnoses this Admission and Hospital Course

## 2023-07-06 NOTE — PLAN OF CARE
Problem: Chronic Conditions and Co-morbidities  Goal: Patient's chronic conditions and co-morbidity symptoms are monitored and maintained or improved  Outcome: Progressing     Problem: Safety - Adult  Goal: Free from fall injury  7/6/2023 1956 by Chandrika Hobbs RN  Outcome: Progressing  7/6/2023 1839 by Edna Alpers, RN  Outcome: Progressing  Flowsheets (Taken 7/6/2023 1839)  Free From Fall Injury: Instruct family/caregiver on patient safety     Problem: Pain  Goal: Verbalizes/displays adequate comfort level or baseline comfort level  Outcome: Progressing     Problem: Skin/Tissue Integrity  Goal: Absence of new skin breakdown  Description: 1. Monitor for areas of redness and/or skin breakdown  2. Assess vascular access sites hourly  3. Every 4-6 hours minimum:  Change oxygen saturation probe site  4. Every 4-6 hours:  If on nasal continuous positive airway pressure, respiratory therapy assess nares and determine need for appliance change or resting period.   7/6/2023 1956 by Chandrika Hobbs RN  Outcome: Progressing  7/6/2023 1839 by Edna Alpers, RN  Outcome: Progressing     Problem: Respiratory - Adult  Goal: Achieves optimal ventilation and oxygenation  7/6/2023 1839 by Edna Alpers, RN  Outcome: Progressing  Flowsheets (Taken 7/6/2023 1839)  Achieves optimal ventilation and oxygenation:   Assess for changes in respiratory status   Assess for changes in mentation and behavior   Position to facilitate oxygenation and minimize respiratory effort   Oxygen supplementation based on oxygen saturation or arterial blood gases

## 2023-07-06 NOTE — H&P
pleural surfaces appear stable. Mild congestion. A nodular density seen in the right upper lobe which measures approximately 1.9 x 1.4 cm not previously seen. This may represent a small focal infiltrate or pulmonary nodule. Redemonstration of hyperinflation with COPD. Congestion. Focal nodular density in the right upper lobe not previously seen measuring 1.9 x 1.4 cm. Focal infiltrate or pulmonary nodule should be considered. RECOMMENDATION: Follow-up until resolution of the right upper lobe opacity or alternatively CT evaluation.          Electronically signed by Niharika Tom MD on 7/5/2023 at 11:50 PM

## 2023-07-06 NOTE — PROGRESS NOTES
Pt. Voided for the first time since being brought up from ED and clear yellow urine has been obtained. Pt. C/o being cold, warm blankets applied. No blood noted at vagina or in urine.

## 2023-07-06 NOTE — PROGRESS NOTES
4 Eyes Skin Assessment     NAME:  Cleve Smith  YOB: 1940  MEDICAL RECORD NUMBER:  [id number]    The patient is being assessed for  Admission    I agree that at least one RN has performed a thorough Head to Toe Skin Assessment on the patient. ALL assessment sites listed below have been assessed. Areas assessed by both nurses:    Head, Face, Ears, Shoulders, Back, Chest, Arms, Elbows, Hands, Sacrum. Buttock, Coccyx, Ischium, Legs. Feet and Heels, and Under Medical Devices         Does the Patient have a Wound?  No noted wound(s)       Trey Prevention initiated by RN: No  Wound Care Orders initiated by RN: No    Pressure Injury (Stage 3,4, Unstageable, DTI, NWPT, and Complex wounds) if present, place Wound referral order by RN under : No    New Ostomies, if present place, Ostomy referral order under : No     Nurse 1 eSignature: Electronically signed by Stefani Sethi RN on 7/5/23 at 10:32 PM EDT    **SHARE this note so that the co-signing nurse can place an eSignature**    Nurse 2 eSignature: Electronically signed by Ricco Bill RN on 7/6/23 at 6:30 AM EDT

## 2023-07-06 NOTE — CONSULTS
Consult Placed     Maria Esther:leida   Date:7/6/23  ODWN:2353     Electronically signed by Elmer Li on 7/6/2023 at 3:35 PM

## 2023-07-06 NOTE — PLAN OF CARE
Problem: Safety - Adult  Goal: Free from fall injury  Outcome: Progressing  Flowsheets (Taken 7/6/2023 1839)  Free From Fall Injury: Instruct family/caregiver on patient safety     Problem: Skin/Tissue Integrity  Goal: Absence of new skin breakdown  Description: 1. Monitor for areas of redness and/or skin breakdown  2. Assess vascular access sites hourly  3. Every 4-6 hours minimum:  Change oxygen saturation probe site  4. Every 4-6 hours:  If on nasal continuous positive airway pressure, respiratory therapy assess nares and determine need for appliance change or resting period.   Outcome: Progressing     Problem: Respiratory - Adult  Goal: Achieves optimal ventilation and oxygenation  Outcome: Progressing  Flowsheets (Taken 7/6/2023 1839)  Achieves optimal ventilation and oxygenation:   Assess for changes in respiratory status   Assess for changes in mentation and behavior   Position to facilitate oxygenation and minimize respiratory effort   Oxygen supplementation based on oxygen saturation or arterial blood gases

## 2023-07-06 NOTE — H&P
Hospital Medicine History & Physical      Date of Admission: 7/6/2023    Date of Service:  Pt seen/examined on 6 July     [x]Admitted to Inpatient with expected LOS greater than two midnights due to medical therapy. []Placed in Observation status. Chief Admission Complaint:  Abdominal Pain w/ Vaginal bleeding    Presenting Admission History:      81 y.o. female who presented to [facility] in transfer from [facility] with vaginal bleeding and associated Abdominal Pain - fount to have possible endometrial mass on imaging. Transferred for OB/GYN evaluation. The patient denies any fever/chills or other signs/sxs of systemic illness or identifiable aggravating/alleviating factors. Assessment/Plan:      Current Principal Problem:  Vaginal bleeding    #Concern for endometrial cancer with active vaginal bleeding   #Acute blood loss anemia   -baseline Hgb ~12   -transfuse Hgb <7.0  -CT abdomen in 2022 with endometrial thickening, never had follow up pelvic US   -CT scan as above , pelvic US done showing possible endometrial mass vs blood clot?  -holding eliquis and plavix   -discussed with [facility], accepted transfer for gynecology oncology eval, patient is agreeable to further work up   -no further episodes of bleeding today   -monitor H & H   -awaiting bed at [facility]      UTI - admission U/A c/w acute cystitis. Infection evidenced by U/A, Cx results and/or signs/sxs of clinical infection despite Cx results. Started on empiric Rocephin 6 July at OSH pending Cx results. Changed to DAILY dosing. Sepsis - w/ Leukocytosis/Tachycardia/Fever/Elevated Lactate POArrival 2nd to above infection. Continue IVF as appropriate and monitor clinical response w/ ABX as written. #Chronic diastolic HF   -does not appear acutely decompensated   -on demadex at home      CKD - baseline stage 3. Follow serial labs. Reviewed and documented in this note. HypoMagnesemia - etiology clinically unable to determine.   Follow

## 2023-07-07 PROBLEM — Z01.810 PRE-OPERATIVE CARDIOVASCULAR EXAMINATION: Status: ACTIVE | Noted: 2023-07-07

## 2023-07-07 LAB
ALBUMIN SERPL-MCNC: 3.1 G/DL (ref 3.4–5)
ANION GAP SERPL CALCULATED.3IONS-SCNC: 9 MMOL/L (ref 3–16)
BACTERIA BLD CULT ORG #2: NORMAL
BACTERIA BLD CULT: NORMAL
BUN SERPL-MCNC: 14 MG/DL (ref 7–20)
CALCIUM SERPL-MCNC: 9.3 MG/DL (ref 8.3–10.6)
CHLORIDE SERPL-SCNC: 95 MMOL/L (ref 99–110)
CO2 SERPL-SCNC: 30 MMOL/L (ref 21–32)
CREAT SERPL-MCNC: 1.2 MG/DL (ref 0.6–1.2)
DEPRECATED RDW RBC AUTO: 14 % (ref 12.4–15.4)
GFR SERPLBLD CREATININE-BSD FMLA CKD-EPI: 45 ML/MIN/{1.73_M2}
GLUCOSE SERPL-MCNC: 98 MG/DL (ref 70–99)
HCT VFR BLD AUTO: 30.6 % (ref 36–48)
HGB BLD-MCNC: 10 G/DL (ref 12–16)
MCH RBC QN AUTO: 30.1 PG (ref 26–34)
MCHC RBC AUTO-ENTMCNC: 32.7 G/DL (ref 31–36)
MCV RBC AUTO: 92.3 FL (ref 80–100)
PHOSPHATE SERPL-MCNC: 2.7 MG/DL (ref 2.5–4.9)
PLATELET # BLD AUTO: 334 K/UL (ref 135–450)
PMV BLD AUTO: 7.6 FL (ref 5–10.5)
POTASSIUM SERPL-SCNC: 3.5 MMOL/L (ref 3.5–5.1)
RBC # BLD AUTO: 3.31 M/UL (ref 4–5.2)
SODIUM SERPL-SCNC: 134 MMOL/L (ref 136–145)
WBC # BLD AUTO: 12.3 K/UL (ref 4–11)

## 2023-07-07 PROCEDURE — 6370000000 HC RX 637 (ALT 250 FOR IP): Performed by: INTERNAL MEDICINE

## 2023-07-07 PROCEDURE — 97166 OT EVAL MOD COMPLEX 45 MIN: CPT

## 2023-07-07 PROCEDURE — 1200000000 HC SEMI PRIVATE

## 2023-07-07 PROCEDURE — 85027 COMPLETE CBC AUTOMATED: CPT

## 2023-07-07 PROCEDURE — 80069 RENAL FUNCTION PANEL: CPT

## 2023-07-07 PROCEDURE — 2700000000 HC OXYGEN THERAPY PER DAY

## 2023-07-07 PROCEDURE — 36415 COLL VENOUS BLD VENIPUNCTURE: CPT

## 2023-07-07 PROCEDURE — 97162 PT EVAL MOD COMPLEX 30 MIN: CPT

## 2023-07-07 PROCEDURE — 97530 THERAPEUTIC ACTIVITIES: CPT

## 2023-07-07 PROCEDURE — 2580000003 HC RX 258: Performed by: INTERNAL MEDICINE

## 2023-07-07 PROCEDURE — 94640 AIRWAY INHALATION TREATMENT: CPT

## 2023-07-07 PROCEDURE — 97116 GAIT TRAINING THERAPY: CPT

## 2023-07-07 PROCEDURE — 99222 1ST HOSP IP/OBS MODERATE 55: CPT | Performed by: INTERNAL MEDICINE

## 2023-07-07 PROCEDURE — 94761 N-INVAS EAR/PLS OXIMETRY MLT: CPT

## 2023-07-07 PROCEDURE — 6360000002 HC RX W HCPCS: Performed by: INTERNAL MEDICINE

## 2023-07-07 RX ADMIN — SODIUM CHLORIDE, PRESERVATIVE FREE 10 ML: 5 INJECTION INTRAVENOUS at 20:15

## 2023-07-07 RX ADMIN — ALBUTEROL SULFATE 2.5 MG: 2.5 SOLUTION RESPIRATORY (INHALATION) at 19:54

## 2023-07-07 RX ADMIN — GUAIFENESIN 600 MG: 600 TABLET ORAL at 08:52

## 2023-07-07 RX ADMIN — METOPROLOL TARTRATE 25 MG: 25 TABLET, FILM COATED ORAL at 20:14

## 2023-07-07 RX ADMIN — TORSEMIDE 20 MG: 20 TABLET ORAL at 08:52

## 2023-07-07 RX ADMIN — ROFLUMILAST 500 MCG: 500 TABLET ORAL at 08:54

## 2023-07-07 RX ADMIN — SERTRALINE 50 MG: 50 TABLET, FILM COATED ORAL at 08:52

## 2023-07-07 RX ADMIN — CEFTRIAXONE SODIUM 1000 MG: 1 INJECTION, POWDER, FOR SOLUTION INTRAMUSCULAR; INTRAVENOUS at 08:57

## 2023-07-07 RX ADMIN — Medication 400 MG: at 08:52

## 2023-07-07 RX ADMIN — METOPROLOL TARTRATE 25 MG: 25 TABLET, FILM COATED ORAL at 08:52

## 2023-07-07 RX ADMIN — ATORVASTATIN CALCIUM 40 MG: 40 TABLET, FILM COATED ORAL at 20:14

## 2023-07-07 RX ADMIN — ALBUTEROL SULFATE 2.5 MG: 2.5 SOLUTION RESPIRATORY (INHALATION) at 11:52

## 2023-07-07 RX ADMIN — SODIUM CHLORIDE, PRESERVATIVE FREE 10 ML: 5 INJECTION INTRAVENOUS at 08:54

## 2023-07-07 RX ADMIN — TIOTROPIUM BROMIDE INHALATION SPRAY 2 PUFF: 3.12 SPRAY, METERED RESPIRATORY (INHALATION) at 07:46

## 2023-07-07 RX ADMIN — BUSPIRONE HYDROCHLORIDE 5 MG: 5 TABLET ORAL at 15:19

## 2023-07-07 RX ADMIN — ALBUTEROL SULFATE 2.5 MG: 2.5 SOLUTION RESPIRATORY (INHALATION) at 07:43

## 2023-07-07 RX ADMIN — PANTOPRAZOLE SODIUM 40 MG: 40 TABLET, DELAYED RELEASE ORAL at 08:52

## 2023-07-07 RX ADMIN — BUSPIRONE HYDROCHLORIDE 5 MG: 5 TABLET ORAL at 08:52

## 2023-07-07 RX ADMIN — Medication 2 PUFF: at 07:43

## 2023-07-07 RX ADMIN — BUSPIRONE HYDROCHLORIDE 5 MG: 5 TABLET ORAL at 20:14

## 2023-07-07 RX ADMIN — ACETAMINOPHEN 650 MG: 325 TABLET ORAL at 06:27

## 2023-07-07 RX ADMIN — FERROUS SULFATE TAB 325 MG (65 MG ELEMENTAL FE) 325 MG: 325 (65 FE) TAB at 08:52

## 2023-07-07 RX ADMIN — Medication 2 PUFF: at 19:56

## 2023-07-07 ASSESSMENT — PAIN DESCRIPTION - ORIENTATION: ORIENTATION: ANTERIOR;POSTERIOR

## 2023-07-07 ASSESSMENT — PAIN DESCRIPTION - DESCRIPTORS: DESCRIPTORS: ACHING

## 2023-07-07 ASSESSMENT — PAIN SCALES - GENERAL
PAINLEVEL_OUTOF10: 3
PAINLEVEL_OUTOF10: 0

## 2023-07-07 ASSESSMENT — PAIN DESCRIPTION - LOCATION: LOCATION: LEG;BACK

## 2023-07-07 NOTE — CONSULTS
[address]   Cardiovascular Evaluation    PATIENT: Usha RODRIGUEZ  DATE: 2023  MRN: [id number]  CSN: [id number]  : 1940    Primary Care Doctor/Referring provider: Brian Patel MD, Charlotte Isabel MD     Reason for evaluation/Chief complaint:   Pre-operative risk assessment. Subjective:    History of present illness on initial date of evaluation:   Jaqulein Wiggins is a 81 y.o. patient who presents for evaluation of vaginal bleeding. Patient was admitted to the hospital for further evaluation. Etiologies been asked to the patient for preoperative evaluation prior to noncardiac surgery. Patient has extensive history of ischemic heart disease. She does not follow-up routinely with cardiology in outpatient setting but did undergo coronary artery bypass grafting surgery . Patient denies any current chest pain or deviation from her baseline shortness of breath. Patient does have extensive history of also COPD and admitted functionality due to her medical conditions. Patient's daughter is at bedside and participates in the history present illness. The patient is able to perform activities of daily living at home. She does walk outside the house on occasion. She does not have any exertional chest pain or symptoms that have been changing for the past several months. Unfortunately, the patient is to have an endometrial mass leading to her vaginal bleeding. She scheduled to undergo further evaluation and possible surgery in the near term future.         Patient Active Problem List   Diagnosis    Hyperlipidemia    Asthma    OA (osteoarthritis)    Tobacco use    COPD exacerbation (HCC)    Septicemia (HCC)    Abnormal chest x-ray    COPD with acute exacerbation (HCC)    Dyspnea and respiratory abnormalities    Tobacco abuse    Moderate malnutrition (HCC)    NSTEMI (non-ST elevated myocardial infarction) ([address])    Acute respiratory failure with hypoxia ([address])

## 2023-07-07 NOTE — CARE COORDINATION
Case Management Assessment  Initial Evaluation    Date/Time of Evaluation: 7/7/2023 3:26 PM  Assessment Completed by: ANN MARIE Doherty    If patient is discharged prior to next notation, then this note serves as note for discharge by case management. Patient Name: Michael Christensen                   YOB: 1940  Diagnosis: Vaginal bleeding [N93.9]                   Date / Time: 7/6/2023  2:06 PM    Patient Admission Status: Inpatient   Readmission Risk (Low < 19, Mod (19-27), High > 27): Readmission Risk Score: 22    Current PCP: Catherine Denise MD  PCP verified by CM? Yes    Chart Reviewed: Yes      History Provided by: Patient  Patient Orientation: Alert and Oriented, Person, Place, Situation, Self    Patient Cognition: Alert    Hospitalization in the last 30 days (Readmission):  No      Advance Directives:      Code Status: Full Code   Patient's Primary Decision Maker is: Legal Next of Kin    Primary Decision MakerCorrincdyney Hernández - [phone]    Secondary Decision Maker: Dallin Castillo  Niece/Nephew - [phone]    Discharge Planning:    Patient lives with: Alone Type of Home: House  Primary Care Giver: Self  Patient Support Systems include: Children, Family Members   Current Financial resources: Medicare  Current community resources: None  Current services prior to admission: Oxygen Therapy, Home Care, Durable Medical Equipment            Current DME: Oxygen Therapy (Comment), Shower Chair, Other (Comment) (RTS)            Type of Home Care services:  [employer], Skilled Therapy, Nursing Services    ADLS  Prior functional level: Assistance with the following:, Bathing, Toileting, Cooking, Housework, Shopping  Current functional level: Assistance with the following:, Bathing, Toileting, Housework, Cooking, Shopping    PT AM-PAC: 19 /24  OT AM-PAC: 19 /24    Family can provide assistance at DC:  Yes  Would you like Case Management to discuss the discharge plan with any

## 2023-07-07 NOTE — CONSULTS
Consult Placed   Consult called to [employer]  Who: Prieto Poe  MZCD:[id number]    Time:2:37PM     Electronically signed by Desiree Scott on 7/7/2023 at 2:37 PM

## 2023-07-07 NOTE — CARE COORDINATION
[facility]  Patient is active with [facility], Will follow for discharge to home with orders to resume care.     Isaura Galindo RN, BSN CTN  [facility] [phone]

## 2023-07-07 NOTE — PROGRESS NOTES
Physician Progress Note      PATIENT:               Olga Montgomery  CSN #:                  [id number]  :                       1940  ADMIT DATE:       2023 5:52 PM  [address] [facility] DATE:        2023 1:33 PM  RESPONDING  PROVIDER #:        Sarita Miller          QUERY TEXT:    Pt admitted with acute cystitis on . Sepsis was documented on . Please   further specify the following:     The medical record reflects the following:  Risk Factors: advanced age, acute cystitis, CHF, CKD, copd  Clinical Indicators: Sepsis -WBC, LA, -hx of MDRO- cultures sensitive to   ceftriaxone -17.6, tachycardia, lactic-2.2  Treatment: Rocephin, lactic, pct, blood cultures, 500cc NS bolus, Tylenol    Thank you,  Kayleen Akbar RN,BSN,CCDS,CRCR  Options provided:  -- Yes, Sepsis was present at the time of the order to admit to the hospital  -- No, Sepsis was not present on admission and developed during the inpatient   stay  -- Other - I will add my own diagnosis  -- Disagree - Not applicable / Not valid  -- Disagree - Clinically unable to determine / Unknown  -- Refer to Clinical Documentation Reviewer    PROVIDER RESPONSE TEXT:    Yes, Sepsis was present at the time of the order to admit to the hospital.    Query created by: Josiah Ennis on 2023 1:16 PM      Electronically signed by:  Sarita Miller 2023 1:23 PM

## 2023-07-07 NOTE — PLAN OF CARE
Problem: Safety - Adult  Goal: Free from fall injury  Outcome: Progressing  Flowsheets (Taken 7/7/2023 1401)  Free From Fall Injury: Instruct family/caregiver on patient safety     Problem: Skin/Tissue Integrity  Goal: Absence of new skin breakdown  Description: 1. Monitor for areas of redness and/or skin breakdown  2. Assess vascular access sites hourly  3. Every 4-6 hours minimum:  Change oxygen saturation probe site  4. Every 4-6 hours:  If on nasal continuous positive airway pressure, respiratory therapy assess nares and determine need for appliance change or resting period.   Outcome: Progressing     Problem: Respiratory - Adult  Goal: Achieves optimal ventilation and oxygenation  Outcome: Progressing  Flowsheets (Taken 7/7/2023 1401)  Achieves optimal ventilation and oxygenation:   Assess for changes in respiratory status   Assess for changes in mentation and behavior   Position to facilitate oxygenation and minimize respiratory effort   Oxygen supplementation based on oxygen saturation or arterial blood gases

## 2023-07-07 NOTE — CONSULTS
Waldemar Petit MD                                                  Gynecologic Oncology                                                        ([phone]    Asked to see this pleasant 81year old with numerous medical problems and now with post-menopausal bleeding, blood loss anemia and a uterine damion. Patient states that her bleeding began last weekend and initially thought she had a UTI as this has been a problem for her in the past. She states that the bleeding was never heavy. She has some mild abdominal cramping but otherwise no complaints. Imaging  reviewed  Lab reviewed  Hgb has stabilized and actually risen since this admission. No blood in depends  Abd  soft,  non-tender    Impression :    Post menopausal bleeding with abnormal uterine imaging in an 82 years with multiple medical problems  Patient also has a new pulmonary nodule which in the process of being evaluated    Ms Jimmy Milligan needs a pelvic exam and endometrial biopsy and my office will be in touch to arrange this. Further recommendations would be determined based on the pathology report but if, as is likely, major surgery (total hysterectomy, bilateral salpingo-oophorectomy, +/- node dissection) were to be in order, patient would need medical/cardiac clearance before proceeding. If this evaluation and clearance could be obtained while she is an inpatient that would be helpful in terms determining next steps. .  Diagnosis of the etiology of the new pulmonary noduecould factor into the management as well    Will follow as outpatient. Patient knows to call my office if she experiences significant vaginal bleeding after being discharged and before her scehduled appointment    Thank you for the consult    Waldemar Nelson.  Eloy Petit M.D., [facility]  [phone]

## 2023-07-08 VITALS
TEMPERATURE: 98.3 F | DIASTOLIC BLOOD PRESSURE: 69 MMHG | BODY MASS INDEX: 19.88 KG/M2 | HEIGHT: 64 IN | RESPIRATION RATE: 18 BRPM | HEART RATE: 81 BPM | SYSTOLIC BLOOD PRESSURE: 101 MMHG | WEIGHT: 116.44 LBS | OXYGEN SATURATION: 99 %

## 2023-07-08 LAB
ALBUMIN SERPL-MCNC: 2.9 G/DL (ref 3.4–5)
ANION GAP SERPL CALCULATED.3IONS-SCNC: 15 MMOL/L (ref 3–16)
BUN SERPL-MCNC: 16 MG/DL (ref 7–20)
CALCIUM SERPL-MCNC: 9.1 MG/DL (ref 8.3–10.6)
CHLORIDE SERPL-SCNC: 92 MMOL/L (ref 99–110)
CO2 SERPL-SCNC: 25 MMOL/L (ref 21–32)
CREAT SERPL-MCNC: 1.3 MG/DL (ref 0.6–1.2)
DEPRECATED RDW RBC AUTO: 13.8 % (ref 12.4–15.4)
GFR SERPLBLD CREATININE-BSD FMLA CKD-EPI: 41 ML/MIN/{1.73_M2}
GLUCOSE SERPL-MCNC: 97 MG/DL (ref 70–99)
HCT VFR BLD AUTO: 33.7 % (ref 36–48)
HGB BLD-MCNC: 10.8 G/DL (ref 12–16)
MCH RBC QN AUTO: 30.1 PG (ref 26–34)
MCHC RBC AUTO-ENTMCNC: 32.1 G/DL (ref 31–36)
MCV RBC AUTO: 93.8 FL (ref 80–100)
PHOSPHATE SERPL-MCNC: 3.2 MG/DL (ref 2.5–4.9)
PLATELET # BLD AUTO: 319 K/UL (ref 135–450)
PLATELET BLD QL SMEAR: ADEQUATE
PMV BLD AUTO: 8.1 FL (ref 5–10.5)
POTASSIUM SERPL-SCNC: 3.5 MMOL/L (ref 3.5–5.1)
RBC # BLD AUTO: 3.59 M/UL (ref 4–5.2)
SLIDE REVIEW: ABNORMAL
SODIUM SERPL-SCNC: 132 MMOL/L (ref 136–145)
WBC # BLD AUTO: 15 K/UL (ref 4–11)

## 2023-07-08 PROCEDURE — 2700000000 HC OXYGEN THERAPY PER DAY

## 2023-07-08 PROCEDURE — 6370000000 HC RX 637 (ALT 250 FOR IP): Performed by: INTERNAL MEDICINE

## 2023-07-08 PROCEDURE — 6360000002 HC RX W HCPCS: Performed by: INTERNAL MEDICINE

## 2023-07-08 PROCEDURE — 80069 RENAL FUNCTION PANEL: CPT

## 2023-07-08 PROCEDURE — 85027 COMPLETE CBC AUTOMATED: CPT

## 2023-07-08 PROCEDURE — 94761 N-INVAS EAR/PLS OXIMETRY MLT: CPT

## 2023-07-08 PROCEDURE — 2580000003 HC RX 258: Performed by: INTERNAL MEDICINE

## 2023-07-08 PROCEDURE — 94640 AIRWAY INHALATION TREATMENT: CPT

## 2023-07-08 RX ORDER — CEFUROXIME AXETIL 500 MG/1
500 TABLET ORAL 2 TIMES DAILY
Qty: 10 TABLET | Refills: 0 | Status: SHIPPED | OUTPATIENT
Start: 2023-07-08 | End: 2023-07-13

## 2023-07-08 RX ORDER — MAGNESIUM SULFATE IN WATER 40 MG/ML
2000 INJECTION, SOLUTION INTRAVENOUS ONCE
Status: COMPLETED | OUTPATIENT
Start: 2023-07-08 | End: 2023-07-08

## 2023-07-08 RX ADMIN — GUAIFENESIN 600 MG: 600 TABLET ORAL at 08:14

## 2023-07-08 RX ADMIN — CEFTRIAXONE SODIUM 1000 MG: 1 INJECTION, POWDER, FOR SOLUTION INTRAMUSCULAR; INTRAVENOUS at 08:21

## 2023-07-08 RX ADMIN — TIOTROPIUM BROMIDE INHALATION SPRAY 2 PUFF: 3.12 SPRAY, METERED RESPIRATORY (INHALATION) at 07:43

## 2023-07-08 RX ADMIN — Medication 2 PUFF: at 07:42

## 2023-07-08 RX ADMIN — SODIUM CHLORIDE, PRESERVATIVE FREE 10 ML: 5 INJECTION INTRAVENOUS at 08:14

## 2023-07-08 RX ADMIN — FERROUS SULFATE TAB 325 MG (65 MG ELEMENTAL FE) 325 MG: 325 (65 FE) TAB at 08:14

## 2023-07-08 RX ADMIN — ALBUTEROL SULFATE 2.5 MG: 2.5 SOLUTION RESPIRATORY (INHALATION) at 07:42

## 2023-07-08 RX ADMIN — Medication 400 MG: at 08:14

## 2023-07-08 RX ADMIN — ALBUTEROL SULFATE 2.5 MG: 2.5 SOLUTION RESPIRATORY (INHALATION) at 11:33

## 2023-07-08 RX ADMIN — ACETAMINOPHEN 650 MG: 325 TABLET ORAL at 10:26

## 2023-07-08 RX ADMIN — PANTOPRAZOLE SODIUM 40 MG: 40 TABLET, DELAYED RELEASE ORAL at 08:13

## 2023-07-08 RX ADMIN — BUSPIRONE HYDROCHLORIDE 5 MG: 5 TABLET ORAL at 13:45

## 2023-07-08 RX ADMIN — ROFLUMILAST 500 MCG: 500 TABLET ORAL at 08:13

## 2023-07-08 RX ADMIN — TORSEMIDE 20 MG: 20 TABLET ORAL at 08:14

## 2023-07-08 RX ADMIN — BUSPIRONE HYDROCHLORIDE 5 MG: 5 TABLET ORAL at 08:13

## 2023-07-08 RX ADMIN — METOPROLOL TARTRATE 25 MG: 25 TABLET, FILM COATED ORAL at 08:13

## 2023-07-08 RX ADMIN — SERTRALINE 50 MG: 50 TABLET, FILM COATED ORAL at 08:13

## 2023-07-08 RX ADMIN — MAGNESIUM SULFATE HEPTAHYDRATE 2000 MG: 40 INJECTION, SOLUTION INTRAVENOUS at 11:03

## 2023-07-08 ASSESSMENT — PAIN DESCRIPTION - DESCRIPTORS: DESCRIPTORS: ACHING

## 2023-07-08 ASSESSMENT — PAIN DESCRIPTION - LOCATION: LOCATION: HEAD

## 2023-07-08 NOTE — PLAN OF CARE
Problem: Chronic Conditions and Co-morbidities  Goal: Patient's chronic conditions and co-morbidity symptoms are monitored and maintained or improved  Outcome: Progressing  Flowsheets (Taken 7/8/2023 0239)  Care Plan - Patient's Chronic Conditions and Co-Morbidity Symptoms are Monitored and Maintained or Improved:   Monitor and assess patient's chronic conditions and comorbid symptoms for stability, deterioration, or improvement   Collaborate with multidisciplinary team to address chronic and comorbid conditions and prevent exacerbation or deterioration   Update acute care plan with appropriate goals if chronic or comorbid symptoms are exacerbated and prevent overall improvement and discharge     Problem: Safety - Adult  Goal: Free from fall injury  7/8/2023 0239 by Niyah Brito  Outcome: Progressing  Flowsheets (Taken 7/8/2023 0239)  Free From Fall Injury:   Based on caregiver fall risk screen, instruct family/caregiver to ask for assistance with transferring infant if caregiver noted to have fall risk factors   Instruct family/caregiver on patient safety  7/7/2023 1401 by Ira Maya, RN  Outcome: Progressing  Flowsheets (Taken 7/7/2023 1401)  Free From Fall Injury: Instruct family/caregiver on patient safety     Problem: Pain  Goal: Verbalizes/displays adequate comfort level or baseline comfort level  Outcome: Progressing  Flowsheets (Taken 7/8/2023 0239)  Verbalizes/displays adequate comfort level or baseline comfort level:   Encourage patient to monitor pain and request assistance   Assess pain using appropriate pain scale   Administer analgesics based on type and severity of pain and evaluate response   Implement non-pharmacological measures as appropriate and evaluate response   Notify Licensed Independent Practitioner if interventions unsuccessful or patient reports new pain   Consider cultural and social influences on pain and pain management     Problem: Skin/Tissue Integrity  Goal: Absence of new

## 2023-07-08 NOTE — PROGRESS NOTES
4 Eyes Skin Assessment     The patient is being assess for  Admission    I agree that 2 RN's have performed a thorough Head to Toe Skin Assessment on the patient. ALL assessment sites listed below have been assessed. Areas assessed by both nurses: Drew Scott RN and Jem Correa RN  [x]   Head, Face, and Ears   [x]   Shoulders, Back, and Chest  [x]   Arms, Elbows, and Hands   [x]   Coccyx, Sacrum, and IschIum  [x]   Legs, Feet, and Heels        Does the Patient have Skin Breakdown?   No         Trey Prevention initiated:  Yes   Wound Care Orders initiated:  No      C nurse consulted for Pressure Injury (Stage 3,4, Unstageable, DTI, NWPT, and Complex wounds), New and Established Ostomies:  No      Nurse 1 eSignature: Electronically signed by Hannah Dumont RN on 7/6/23 at 6:32 PM EDT    **SHARE this note so that the co-signing nurse is able to place an eSignature**    Nurse 2 eSignature: Electronically signed by Charley Mallory RN on 7/6/23 at 6:34 PM EDT
Head to toe completed and documented. Pt a/o. Denies pain. Denies needs at this time. Call bell in reach. Bed alarm on for safety.
Hospital Medicine Progress Note      Date of Admission: 7/6/2023  Hospital Day: 2    Chief Admission Complaint:  vaginal bleeding     Subjective:  no new c/o. Presenting Admission History:       81 y.o. female who presented to [facility] in transfer from [facility] with vaginal bleeding and associated Abdominal Pain - fount to have possible endometrial mass on imaging. Transferred for OB/GYN evaluation. The patient denies any fever/chills or other signs/sxs of systemic illness or identifiable aggravating/alleviating factors. Assessment/Plan:      Current Principal Problem:  Vaginal bleeding    Endometrial cancer - clinically suspected with active vaginal bleeding requiring transfer to [facility] for GYN eval - consulted and appreciated. -CT abdomen in 2022 with endometrial thickening, never had follow up pelvic US   -CT scan as above , pelvic US done showing possible endometrial mass  -holding eliquis and plavix   - Cardiology consulted at the request of GYN for cardiac clearance and perioperative medical mgt given hx of CHF/Afib and elevated trops       Anemia - likely 2nd to acute GYN blood loss as above, w/out evidence of active bleeding/hemolysis. Stable and asymptomatic w/out indication for transfusion. Follow serial labs. Reviewed and documented in this note     UTI - admission U/A c/w acute cystitis. Infection evidenced by U/A, Cx results and/or signs/sxs of clinical infection despite Cx results. Started on empiric Rocephin 6 July at OSH pending Cx results. Changed to DAILY dosing. Sepsis - w/ Leukocytosis/Tachycardia/Fever/Elevated Lactate POArrival 2nd to above infection. Continue IVF as appropriate and monitor clinical response w/ ABX as written. CHF - chronic diastolic failure w/ preserved EF 50-55% by Echo dated May 2023. Likely due to hypertensive heart disease. Patient is euvolemic w/ no evidence of acute decompensation. Continue current medical mgt.
Occupational Therapy  Facility/Department: [facility] C3 TELE/MED SURG/ONC  Occupational Therapy Initial Assessment    Name: Hao San  : 1940  MRN: [id number]  Date of Service: 2023    Discharge Recommendations:  24 hour supervision or assist (home)  OT Equipment Recommendations  Equipment Needed: No    If pt is unable to be seen after this session, please let this note serve as discharge summary. Please see case management note for discharge disposition. Thank you. Patient Diagnosis(es): There were no encounter diagnoses. Past Medical History:  has a past medical history of NADJA (acute kidney injury) ([address]), Asthma, CAD (coronary artery disease), CHF (congestive heart failure) ([address]), Chronic anxiety, COPD (chronic obstructive pulmonary disease) ([address]), GERD (gastroesophageal reflux disease), Heart attack ([address]), History of blood transfusion, Hyperlipidemia, Hypertension, MRSA (methicillin resistant staph aureus) culture positive, OA (osteoarthritis), and Thyroid disease. Past Surgical History:  has a past surgical history that includes  section; Mastectomy, partial (Left); bronchoscopy (2019); Cardiac catheterization (2019); Coronary artery bypass graft (N/A, 2019); Colonoscopy (N/A, 2020); Sigmoidoscopy (2020); sigmoidoscopy (N/A, 2020); IR MIDLINE CATH (2021); Colonoscopy (N/A, 10/22/2021); Upper gastrointestinal endoscopy (N/A, 2023); Upper gastrointestinal endoscopy (N/A, 2023); and Upper gastrointestinal endoscopy (2023). Assessment   Performance deficits / Impairments: Decreased functional mobility ; Decreased ADL status; Decreased endurance;Decreased safe awareness;Decreased balance  Assessment: Pt was admitted to the hospital following hematuria. Pt was independent with ADLs and functional mobility at her baseline.  Today, pt required SBA-CGA for functional transfers and mobility w/ RW, Aimee for UB dressing, and SBA for LB
Perfect serve message sent to Dr. Jay Perez, went to Dr. Isaiah Vega, \"Pt wears c-pap @ night and wants to make sure it will be set up for tonight. Also, daughter is still waiting to speak to physician before she leaves. She has been here since 1400. Thanks. \", awaiting response.
Physical Therapy  Facility/Department: [facility] C3 TELE/MED SURG/ONC  Physical Therapy Initial Assessment/Treatment     Name: Alejandro Rocha  : 1940  MRN: [id number]  Date of Service: 2023    Discharge Recommendations:  24 hour supervision or assist   PT Equipment Recommendations  Equipment Needed: No  Other: Has cane and SW if needed      Patient Diagnosis(es): There were no encounter diagnoses. Past Medical History:  has a past medical history of NADJA (acute kidney injury) ([address]), Asthma, CAD (coronary artery disease), CHF (congestive heart failure) ([address]), Chronic anxiety, COPD (chronic obstructive pulmonary disease) ([address]), GERD (gastroesophageal reflux disease), Heart attack ([address]), History of blood transfusion, Hyperlipidemia, Hypertension, MRSA (methicillin resistant staph aureus) culture positive, OA (osteoarthritis), and Thyroid disease. Past Surgical History:  has a past surgical history that includes  section; Mastectomy, partial (Left); bronchoscopy (2019); Cardiac catheterization (2019); Coronary artery bypass graft (N/A, 2019); Colonoscopy (N/A, 2020); Sigmoidoscopy (2020); sigmoidoscopy (N/A, 2020); IR MIDLINE CATH (2021); Colonoscopy (N/A, 10/22/2021); Upper gastrointestinal endoscopy (N/A, 2023); Upper gastrointestinal endoscopy (N/A, 2023); and Upper gastrointestinal endoscopy (2023). Assessment   Body Structures, Functions, Activity Limitations Requiring Skilled Therapeutic Intervention: Decreased functional mobility ; Decreased safe awareness;Decreased balance;Decreased strength;Decreased posture;Decreased endurance  Assessment: Pt to [facility] with diagnosis of vaginal bleeding. PTA pt lives with son in single story house with 1 DIANE. Pt was independent with transfers and ambulation with no AD, per daughter pt refuses to use cane or walker.  Pt currently is able to complete bed mobility with supervision, transfers with SBA, and
Pt D/C to home per order. PIV removed. D/C instructions reviewed with pt and pt's daughter. Verbalized understanding. Ceftin script called in to [facility].   [phone]
Pt a/o. VSS. Shift assessment updated and documented.   Alert and oriented x 4 , vitals stable , order was request in for CPAP , Due medications given , ha no complaints at the present , will continue to monitor
Pt admitted to room 322 via transport from [facility]. Daughter @ bedside. Pt oriented to room. 4 eye skin assessment completed w/ Margaret Gonzalez RN. Admission assessment completed. Pt a/o. VSS. Pt 96% - 2L/NC. Pt anxious. Pt purewick in place. Pt dyspnea at rest and unable to ambulate. Q2T. Bed in lowest position and wheels locked. Call light within reach. Bedside table within reach. Calls out appropriately.
Pt assessment completed and charted. VSS. Pt a/o. Pt 96% SpO2 - 3L/NC - baseline. No vaginal bleeding noting. Versette in place and patent. Daughter @ bedside. Pt Ax2 w/ walker. Stand Pivot to [facility]. Bed in lowest position and wheels locked. Call light within reach. Bedside table within reach. Non-skid footwear in place. Pt denies any other needs at this time. Pt calls out appropriately.
Shift assessment completed. Pt A&O, VSS. Denies any needs at this time. Bed locked and in lowest position, side rails up 2/4. Call light within reach.
conjunction w/ labs as documented for evidence of drug toxicity. Infusion Medications    sodium chloride       Scheduled Medications    busPIRone  5 mg Oral TID    atorvastatin  40 mg Oral Nightly    ferrous sulfate  325 mg Oral Daily with breakfast    guaiFENesin  600 mg Oral Daily    magnesium oxide  400 mg Oral Daily    metoprolol tartrate  25 mg Oral BID    pantoprazole  40 mg Oral Daily    Roflumilast  500 mcg Oral Daily    sertraline  50 mg Oral Daily    torsemide  20 mg Oral Daily    sodium chloride flush  5-40 mL IntraVENous 2 times per day    mometasone-formoterol  2 puff Inhalation BID    And    tiotropium  2 puff Inhalation Daily    albuterol  2.5 mg Nebulization 4x daily    cefTRIAXone (ROCEPHIN) IV  1,000 mg IntraVENous Daily     PRN Meds: fluticasone, nitroGLYCERIN, sodium chloride flush, sodium chloride, ondansetron **OR** ondansetron, polyethylene glycol, acetaminophen **OR** acetaminophen     Labs:  Personally reviewed and interpreted for clinical significance. Recent Labs     07/06/23  1009 07/07/23  0529 07/08/23  0552   WBC 12.5* 12.3* 15.0*   HGB 9.9* 10.0* 10.8*   HCT 30.5* 30.6* 33.7*    334 319       Recent Labs     07/05/23 1840 07/07/23  0528 07/08/23  0552    134* 132*   K 4.4 3.5 3.5   CL 95* 95* 92*   CO2 27 30 25   BUN 22* 14 16   CREATININE 1.5* 1.2 1.3*   CALCIUM 10.1 9.3 9.1   MG 1.60*  --   --    PHOS  --  2.7 3.2       Recent Labs     07/05/23 1840 07/05/23 1947   PROBNP 1,368*  --    TROPHS 51* 47*       No results for input(s): LABA1C in the last 72 hours. Recent Labs     07/05/23 1840   AST 11*   ALT <5*   BILITOT <0.2   ALKPHOS 74       Recent Labs     07/05/23 1947   INR 1.21*         Urine Cultures:   Lab Results   Component Value Date/Time    LABURIN  07/05/2023 06:11 PM     25,000 CFU/ml  Susceptibility testing of penicillin and other beta lactams is  not necessary for beta hemolytic Streptococci since resistant  strains have not been identified.

## 2023-07-08 NOTE — DISCHARGE INSTRUCTIONS
Follow up with Dr. Larissa Ahumada as instructed, notify office for any significant vaginal bleeding     Urinary Tract Infection (UTI) in Women: Care Instructions  Overview     A urinary tract infection, or UTI, is a general term for an infection anywhere between the kidneys and the urethra (where urine comes out). Most UTIs are bladder infections. They often cause pain or burning when you urinate. UTIs are caused by bacteria and can be cured with antibiotics. Be sure to complete your treatment so that the infection does not get worse. Follow-up care is a key part of your treatment and safety. Be sure to make and go to all appointments, and call your doctor if you are having problems. It's also a good idea to know your test results and keep a list of the medicines you take. How can you care for yourself at home? Take your antibiotics as directed. Do not stop taking them just because you feel better. You need to take the full course of antibiotics. Drink extra water and other fluids for the next day or two. This will help make the urine less concentrated and help wash out the bacteria that are causing the infection. (If you have kidney, heart, or liver disease and have to limit fluids, talk with your doctor before you increase the amount of fluids you drink.)  Avoid drinks that are carbonated or have caffeine. They can irritate the bladder. Urinate often. Try to empty your bladder each time. To relieve pain, take a hot bath or lay a heating pad set on low over your lower belly or genital area. Never go to sleep with a heating pad in place. To prevent UTIs  Drink plenty of water each day. This helps you urinate often, which clears bacteria from your system. (If you have kidney, heart, or liver disease and have to limit fluids, talk with your doctor before you increase the amount of fluids you drink.)  Urinate when you need to. If you are sexually active, urinate right after you have sex.   Change sanitary pads

## 2023-07-08 NOTE — PLAN OF CARE
Problem: Chronic Conditions and Co-morbidities  Goal: Patient's chronic conditions and co-morbidity symptoms are monitored and maintained or improved  7/8/2023 1034 by Jl Lyons RN  Outcome: Progressing  7/8/2023 0239 by Caitlin Decker  Outcome: Progressing  Flowsheets (Taken 7/8/2023 0239)  Care Plan - Patient's Chronic Conditions and Co-Morbidity Symptoms are Monitored and Maintained or Improved:   Monitor and assess patient's chronic conditions and comorbid symptoms for stability, deterioration, or improvement   Collaborate with multidisciplinary team to address chronic and comorbid conditions and prevent exacerbation or deterioration   Update acute care plan with appropriate goals if chronic or comorbid symptoms are exacerbated and prevent overall improvement and discharge     Problem: Safety - Adult  Goal: Free from fall injury  7/8/2023 1034 by Jl Lyons RN  Flowsheets (Taken 7/8/2023 0239 by Caitlin Decker)  Free From Fall Injury:   Based on caregiver fall risk screen, instruct family/caregiver to ask for assistance with transferring infant if caregiver noted to have fall risk factors   Instruct family/caregiver on patient safety  7/8/2023 0239 by Caitlin Decker  Outcome: Progressing  Flowsheets (Taken 7/8/2023 0239)  Free From Fall Injury:   Based on caregiver fall risk screen, instruct family/caregiver to ask for assistance with transferring infant if caregiver noted to have fall risk factors   Instruct family/caregiver on patient safety     Problem: Pain  Goal: Verbalizes/displays adequate comfort level or baseline comfort level  7/8/2023 1034 by Jl Lyons RN  Outcome: Progressing  7/8/2023 0239 by Caitlin Decker  Outcome: Progressing  Flowsheets (Taken 7/8/2023 0239)  Verbalizes/displays adequate comfort level or baseline comfort level:   Encourage patient to monitor pain and request assistance   Assess pain using appropriate pain scale   Administer analgesics based on type

## 2023-07-08 NOTE — CARE COORDINATION
CASE MANAGEMENT DISCHARGE SUMMARY      Discharge to: Home with [address]     Transportation:    Family/car: yes       Confirmed discharge plan with:     Patient: yes per RN     Family:  yes dtr [facility] - Child - [phone]     RN, name: Roby Carbajal RN    Note: Discharging nurse to complete LUPE, reconcile AVS, and place final copy with patient's discharge packet. RN to ensure that written prescriptions for  Level II medications are sent with patient to the facility as per protocol.

## 2023-07-08 NOTE — DISCHARGE INSTR - COC
Continuity of Care Form    Patient Name: Alis Durbin   :    MRN:  [id number]    Admit date:  2023  Discharge date:  2023    Code Status Order: Full Code   Advance Directives:     Admitting Physician:  David Kate MD  PCP: Jeannie Ya MD    Discharging Nurse: [facility] Unit/Room#: 6616/7361-78  Discharging Unit Phone Number: [phone]    Emergency Contact:   Extended Emergency Contact Information  Primary Emergency Contact: Carlos Mckinney of [address] [place] Phone: [phone]  Relation: Child  Secondary Emergency Contact: [address] Phone: [phone]  Mobile Phone: [phone]  Relation: Niece/Nephew    Past Surgical History:  Past Surgical History:   Procedure Laterality Date    BRONCHOSCOPY  2019    Dr. You Dys - w/BAL    CARDIAC CATHETERIZATION  2019    Dr. Aurora Lopez 2020    COLONOSCOPY DIAGNOSTIC performed by Masha Cho DO at [facility] N/A 10/22/2021    COLONOSCOPY POLYPECTOMY SNARE/COLD BIOPSY performed by Moe Solorio MD at [facility] N/A 2019    OFF PUMP CORONARY ARTERY BYPASS GRAFTING X2, INTERNAL MAMMARY ARTERY, SAPHENOUS VEIN GRAFT performed by Tiffani Tirado MD at [facility] CATH  2021    IR MIDLINE CATH 2021 [address], PARTIAL Left     SIGMOIDOSCOPY  2020    4 bands    SIGMOIDOSCOPY N/A 2020    FLEX SIG W/ BANDING SIGMOIDOSCOPY DIAGNOSTIC FLEXIBLE [facility] 20[facility]    EGD DIAGNOSTIC ONLY [facility] [facility]    EGD BIOPSY [facility]  2023    EGD

## 2023-07-10 LAB
BACTERIA BLD CULT ORG #2: NORMAL
BACTERIA BLD CULT: NORMAL

## 2023-08-03 ENCOUNTER — HOSPITAL ENCOUNTER (INPATIENT)
Age: 83
LOS: 4 days | Discharge: HOME HEALTH | DRG: 378 | End: 2023-08-07
Attending: EMERGENCY MEDICINE | Admitting: INTERNAL MEDICINE
Payer: MEDICARE

## 2023-08-03 DIAGNOSIS — D62 ACUTE BLOOD LOSS ANEMIA: ICD-10-CM

## 2023-08-03 DIAGNOSIS — K92.2 LOWER GI BLEED: Primary | ICD-10-CM

## 2023-08-03 DIAGNOSIS — K92.1 HEMATOCHEZIA: ICD-10-CM

## 2023-08-03 LAB
ABO + RH BLD: NORMAL
ALBUMIN SERPL-MCNC: 3.4 G/DL (ref 3.4–5)
ALBUMIN/GLOB SERPL: 1 {RATIO} (ref 1.1–2.2)
ALP SERPL-CCNC: 80 U/L (ref 40–129)
ALT SERPL-CCNC: <5 U/L (ref 10–40)
ANION GAP SERPL CALCULATED.3IONS-SCNC: 15 MMOL/L (ref 3–16)
APTT BLD: 36.3 SEC (ref 22.7–35.9)
AST SERPL-CCNC: 11 U/L (ref 15–37)
BASOPHILS # BLD: 0.2 K/UL (ref 0–0.2)
BASOPHILS NFR BLD: 1.1 %
BILIRUB SERPL-MCNC: <0.2 MG/DL (ref 0–1)
BLD GP AB SCN SERPL QL: NORMAL
BUN SERPL-MCNC: 21 MG/DL (ref 7–20)
CALCIUM SERPL-MCNC: 9.6 MG/DL (ref 8.3–10.6)
CHLORIDE SERPL-SCNC: 94 MMOL/L (ref 99–110)
CO2 SERPL-SCNC: 24 MMOL/L (ref 21–32)
CREAT SERPL-MCNC: 1.4 MG/DL (ref 0.6–1.2)
DEPRECATED RDW RBC AUTO: 13.6 % (ref 12.4–15.4)
EOSINOPHIL # BLD: 0.2 K/UL (ref 0–0.6)
EOSINOPHIL NFR BLD: 1.3 %
GFR SERPLBLD CREATININE-BSD FMLA CKD-EPI: 38 ML/MIN/{1.73_M2}
GLUCOSE SERPL-MCNC: 120 MG/DL (ref 70–99)
HCT VFR BLD AUTO: 24.1 % (ref 36–48)
HCT VFR BLD AUTO: 25.2 % (ref 36–48)
HCT VFR BLD AUTO: 26.9 % (ref 36–48)
HEMOCCULT SP1 STL QL: ABNORMAL
HGB BLD-MCNC: 7.9 G/DL (ref 12–16)
HGB BLD-MCNC: 8.6 G/DL (ref 12–16)
HGB BLD-MCNC: 8.9 G/DL (ref 12–16)
INR PPP: 1.13 (ref 0.84–1.16)
LYMPHOCYTES # BLD: 2.2 K/UL (ref 1–5.1)
LYMPHOCYTES NFR BLD: 15.3 %
MCH RBC QN AUTO: 29.7 PG (ref 26–34)
MCHC RBC AUTO-ENTMCNC: 33 G/DL (ref 31–36)
MCV RBC AUTO: 90.1 FL (ref 80–100)
MONOCYTES # BLD: 0.7 K/UL (ref 0–1.3)
MONOCYTES NFR BLD: 4.9 %
NEUTROPHILS # BLD: 11.1 K/UL (ref 1.7–7.7)
NEUTROPHILS NFR BLD: 77.4 %
PLATELET # BLD AUTO: 401 K/UL (ref 135–450)
PMV BLD AUTO: 7.2 FL (ref 5–10.5)
POTASSIUM SERPL-SCNC: 3.6 MMOL/L (ref 3.5–5.1)
PROT SERPL-MCNC: 6.8 G/DL (ref 6.4–8.2)
PROTHROMBIN TIME: 14.5 SEC (ref 11.5–14.8)
RBC # BLD AUTO: 2.99 M/UL (ref 4–5.2)
SODIUM SERPL-SCNC: 133 MMOL/L (ref 136–145)
WBC # BLD AUTO: 14.4 K/UL (ref 4–11)

## 2023-08-03 PROCEDURE — 94761 N-INVAS EAR/PLS OXIMETRY MLT: CPT

## 2023-08-03 PROCEDURE — 85025 COMPLETE CBC W/AUTO DIFF WBC: CPT

## 2023-08-03 PROCEDURE — 86901 BLOOD TYPING SEROLOGIC RH(D): CPT

## 2023-08-03 PROCEDURE — 97165 OT EVAL LOW COMPLEX 30 MIN: CPT

## 2023-08-03 PROCEDURE — C9113 INJ PANTOPRAZOLE SODIUM, VIA: HCPCS | Performed by: EMERGENCY MEDICINE

## 2023-08-03 PROCEDURE — 6360000002 HC RX W HCPCS: Performed by: EMERGENCY MEDICINE

## 2023-08-03 PROCEDURE — 97116 GAIT TRAINING THERAPY: CPT

## 2023-08-03 PROCEDURE — 2580000003 HC RX 258: Performed by: EMERGENCY MEDICINE

## 2023-08-03 PROCEDURE — 97162 PT EVAL MOD COMPLEX 30 MIN: CPT

## 2023-08-03 PROCEDURE — 6370000000 HC RX 637 (ALT 250 FOR IP): Performed by: INTERNAL MEDICINE

## 2023-08-03 PROCEDURE — 96361 HYDRATE IV INFUSION ADD-ON: CPT

## 2023-08-03 PROCEDURE — 86900 BLOOD TYPING SEROLOGIC ABO: CPT

## 2023-08-03 PROCEDURE — 99285 EMERGENCY DEPT VISIT HI MDM: CPT

## 2023-08-03 PROCEDURE — C9113 INJ PANTOPRAZOLE SODIUM, VIA: HCPCS | Performed by: INTERNAL MEDICINE

## 2023-08-03 PROCEDURE — 6360000002 HC RX W HCPCS: Performed by: INTERNAL MEDICINE

## 2023-08-03 PROCEDURE — 85610 PROTHROMBIN TIME: CPT

## 2023-08-03 PROCEDURE — 82270 OCCULT BLOOD FECES: CPT

## 2023-08-03 PROCEDURE — 86850 RBC ANTIBODY SCREEN: CPT

## 2023-08-03 PROCEDURE — 80053 COMPREHEN METABOLIC PANEL: CPT

## 2023-08-03 PROCEDURE — 1200000000 HC SEMI PRIVATE

## 2023-08-03 PROCEDURE — 85018 HEMOGLOBIN: CPT

## 2023-08-03 PROCEDURE — 97530 THERAPEUTIC ACTIVITIES: CPT

## 2023-08-03 PROCEDURE — 96375 TX/PRO/DX INJ NEW DRUG ADDON: CPT

## 2023-08-03 PROCEDURE — 2580000003 HC RX 258: Performed by: INTERNAL MEDICINE

## 2023-08-03 PROCEDURE — 96365 THER/PROPH/DIAG IV INF INIT: CPT

## 2023-08-03 PROCEDURE — 2700000000 HC OXYGEN THERAPY PER DAY

## 2023-08-03 PROCEDURE — 94640 AIRWAY INHALATION TREATMENT: CPT

## 2023-08-03 PROCEDURE — 85014 HEMATOCRIT: CPT

## 2023-08-03 PROCEDURE — 85730 THROMBOPLASTIN TIME PARTIAL: CPT

## 2023-08-03 PROCEDURE — 36415 COLL VENOUS BLD VENIPUNCTURE: CPT

## 2023-08-03 RX ORDER — PANTOPRAZOLE SODIUM 40 MG/10ML
40 INJECTION, POWDER, LYOPHILIZED, FOR SOLUTION INTRAVENOUS
Status: DISCONTINUED | OUTPATIENT
Start: 2023-08-03 | End: 2023-08-07 | Stop reason: HOSPADM

## 2023-08-03 RX ORDER — PROCHLORPERAZINE EDISYLATE 5 MG/ML
10 INJECTION INTRAMUSCULAR; INTRAVENOUS EVERY 6 HOURS PRN
Status: DISCONTINUED | OUTPATIENT
Start: 2023-08-03 | End: 2023-08-07 | Stop reason: HOSPADM

## 2023-08-03 RX ORDER — FLUTICASONE PROPIONATE 50 MCG
1 SPRAY, SUSPENSION (ML) NASAL DAILY PRN
Status: DISCONTINUED | OUTPATIENT
Start: 2023-08-03 | End: 2023-08-07 | Stop reason: HOSPADM

## 2023-08-03 RX ORDER — TORSEMIDE 20 MG/1
20 TABLET ORAL DAILY
Status: DISCONTINUED | OUTPATIENT
Start: 2023-08-03 | End: 2023-08-07 | Stop reason: HOSPADM

## 2023-08-03 RX ORDER — ROFLUMILAST 500 UG/1
500 TABLET ORAL DAILY
Status: DISCONTINUED | OUTPATIENT
Start: 2023-08-03 | End: 2023-08-07 | Stop reason: HOSPADM

## 2023-08-03 RX ORDER — ATORVASTATIN CALCIUM 40 MG/1
40 TABLET, FILM COATED ORAL NIGHTLY
Status: DISCONTINUED | OUTPATIENT
Start: 2023-08-03 | End: 2023-08-07 | Stop reason: HOSPADM

## 2023-08-03 RX ORDER — POLYETHYLENE GLYCOL 3350 17 G/17G
17 POWDER, FOR SOLUTION ORAL DAILY PRN
Status: DISCONTINUED | OUTPATIENT
Start: 2023-08-03 | End: 2023-08-07 | Stop reason: HOSPADM

## 2023-08-03 RX ORDER — SODIUM CHLORIDE 0.9 % (FLUSH) 0.9 %
5-40 SYRINGE (ML) INJECTION PRN
Status: DISCONTINUED | OUTPATIENT
Start: 2023-08-03 | End: 2023-08-07 | Stop reason: HOSPADM

## 2023-08-03 RX ORDER — ALBUTEROL SULFATE 2.5 MG/3ML
2.5 SOLUTION RESPIRATORY (INHALATION) EVERY 4 HOURS PRN
Status: DISCONTINUED | OUTPATIENT
Start: 2023-08-03 | End: 2023-08-03

## 2023-08-03 RX ORDER — SODIUM CHLORIDE 0.9 % (FLUSH) 0.9 %
5-40 SYRINGE (ML) INJECTION EVERY 12 HOURS SCHEDULED
Status: DISCONTINUED | OUTPATIENT
Start: 2023-08-03 | End: 2023-08-07 | Stop reason: HOSPADM

## 2023-08-03 RX ORDER — ALBUTEROL SULFATE 2.5 MG/3ML
2.5 SOLUTION RESPIRATORY (INHALATION) 3 TIMES DAILY
Status: DISCONTINUED | OUTPATIENT
Start: 2023-08-03 | End: 2023-08-07 | Stop reason: HOSPADM

## 2023-08-03 RX ORDER — BUSPIRONE HYDROCHLORIDE 5 MG/1
5 TABLET ORAL 3 TIMES DAILY
Status: DISCONTINUED | OUTPATIENT
Start: 2023-08-03 | End: 2023-08-07 | Stop reason: HOSPADM

## 2023-08-03 RX ORDER — SODIUM CHLORIDE 9 MG/ML
INJECTION, SOLUTION INTRAVENOUS PRN
Status: DISCONTINUED | OUTPATIENT
Start: 2023-08-03 | End: 2023-08-07 | Stop reason: HOSPADM

## 2023-08-03 RX ORDER — PANTOPRAZOLE SODIUM 40 MG/10ML
80 INJECTION, POWDER, LYOPHILIZED, FOR SOLUTION INTRAVENOUS ONCE
Status: COMPLETED | OUTPATIENT
Start: 2023-08-03 | End: 2023-08-03

## 2023-08-03 RX ORDER — FERROUS SULFATE 325(65) MG
325 TABLET ORAL
Status: DISCONTINUED | OUTPATIENT
Start: 2023-08-04 | End: 2023-08-07 | Stop reason: HOSPADM

## 2023-08-03 RX ORDER — ACETAMINOPHEN 325 MG/1
650 TABLET ORAL EVERY 6 HOURS PRN
Status: DISCONTINUED | OUTPATIENT
Start: 2023-08-03 | End: 2023-08-07 | Stop reason: HOSPADM

## 2023-08-03 RX ORDER — GUAIFENESIN 600 MG/1
600 TABLET, EXTENDED RELEASE ORAL DAILY
Status: DISCONTINUED | OUTPATIENT
Start: 2023-08-03 | End: 2023-08-07 | Stop reason: HOSPADM

## 2023-08-03 RX ORDER — ACETAMINOPHEN 650 MG/1
650 SUPPOSITORY RECTAL EVERY 6 HOURS PRN
Status: DISCONTINUED | OUTPATIENT
Start: 2023-08-03 | End: 2023-08-07 | Stop reason: HOSPADM

## 2023-08-03 RX ORDER — SODIUM CHLORIDE 9 MG/ML
50 INJECTION, SOLUTION INTRAVENOUS ONCE
Status: COMPLETED | OUTPATIENT
Start: 2023-08-03 | End: 2023-08-03

## 2023-08-03 RX ORDER — HYDROXYZINE PAMOATE 25 MG/1
25 CAPSULE ORAL ONCE
Status: DISCONTINUED | OUTPATIENT
Start: 2023-08-03 | End: 2023-08-07 | Stop reason: HOSPADM

## 2023-08-03 RX ORDER — LANOLIN ALCOHOL/MO/W.PET/CERES
400 CREAM (GRAM) TOPICAL DAILY
Status: DISCONTINUED | OUTPATIENT
Start: 2023-08-03 | End: 2023-08-07 | Stop reason: HOSPADM

## 2023-08-03 RX ADMIN — SODIUM CHLORIDE, PRESERVATIVE FREE 10 ML: 5 INJECTION INTRAVENOUS at 13:27

## 2023-08-03 RX ADMIN — ROFLUMILAST 500 MCG: 500 TABLET ORAL at 13:27

## 2023-08-03 RX ADMIN — PANTOPRAZOLE SODIUM 80 MG: 40 INJECTION, POWDER, FOR SOLUTION INTRAVENOUS at 08:03

## 2023-08-03 RX ADMIN — SODIUM CHLORIDE 50 ML: 9 INJECTION, SOLUTION INTRAVENOUS at 07:42

## 2023-08-03 RX ADMIN — Medication 400 MG: at 13:25

## 2023-08-03 RX ADMIN — ALBUTEROL SULFATE 2.5 MG: 2.5 SOLUTION RESPIRATORY (INHALATION) at 20:59

## 2023-08-03 RX ADMIN — Medication 2 PUFF: at 12:50

## 2023-08-03 RX ADMIN — PROCHLORPERAZINE EDISYLATE 10 MG: 5 INJECTION INTRAMUSCULAR; INTRAVENOUS at 21:20

## 2023-08-03 RX ADMIN — PANTOPRAZOLE SODIUM 40 MG: 40 INJECTION, POWDER, FOR SOLUTION INTRAVENOUS at 16:21

## 2023-08-03 RX ADMIN — POLYETHYLENE GLYCOL-3350 AND ELECTROLYTES 4000 ML: 236; 6.74; 5.86; 2.97; 22.74 POWDER, FOR SOLUTION ORAL at 21:14

## 2023-08-03 RX ADMIN — PROTHROMBIN, COAGULATION FACTOR VII HUMAN, COAGULATION FACTOR IX HUMAN, COAGULATION FACTOR X HUMAN, PROTEIN C, PROTEIN S HUMAN, AND WATER 2000 UNITS: KIT at 07:30

## 2023-08-03 RX ADMIN — BUSPIRONE HYDROCHLORIDE 5 MG: 5 TABLET ORAL at 13:25

## 2023-08-03 RX ADMIN — ACETAMINOPHEN 650 MG: 325 TABLET ORAL at 21:13

## 2023-08-03 RX ADMIN — SODIUM CHLORIDE, PRESERVATIVE FREE 10 ML: 5 INJECTION INTRAVENOUS at 21:14

## 2023-08-03 RX ADMIN — Medication 2 PUFF: at 20:59

## 2023-08-03 RX ADMIN — ATORVASTATIN CALCIUM 40 MG: 40 TABLET, FILM COATED ORAL at 21:13

## 2023-08-03 RX ADMIN — BUSPIRONE HYDROCHLORIDE 5 MG: 5 TABLET ORAL at 21:13

## 2023-08-03 RX ADMIN — ALBUTEROL SULFATE 2.5 MG: 2.5 SOLUTION RESPIRATORY (INHALATION) at 12:43

## 2023-08-03 RX ADMIN — SERTRALINE 50 MG: 50 TABLET, FILM COATED ORAL at 13:26

## 2023-08-03 RX ADMIN — GUAIFENESIN 600 MG: 600 TABLET ORAL at 13:26

## 2023-08-03 ASSESSMENT — ENCOUNTER SYMPTOMS
EYES NEGATIVE: 1
ALLERGIC/IMMUNOLOGIC NEGATIVE: 1
BLOOD IN STOOL: 1
RESPIRATORY NEGATIVE: 1

## 2023-08-03 ASSESSMENT — PAIN SCALES - GENERAL
PAINLEVEL_OUTOF10: 0
PAINLEVEL_OUTOF10: 5
PAINLEVEL_OUTOF10: 0

## 2023-08-03 ASSESSMENT — PAIN DESCRIPTION - LOCATION: LOCATION: BACK;LEG

## 2023-08-03 ASSESSMENT — PAIN - FUNCTIONAL ASSESSMENT: PAIN_FUNCTIONAL_ASSESSMENT: 0-10

## 2023-08-03 NOTE — PROGRESS NOTES
Physical Therapy  Facility/Department: [place] [address] - [facility]  Physical Therapy Initial Assessment/Discharge Summary    Name: Nirmal Vergara  : 1940  MRN: [id number]  Date of Service: 8/3/2023    Discharge Recommendations:  24 hour supervision or assist   PT Equipment Recommendations  Equipment Needed: No  Other: pt has DME at home if needed      Patient Diagnosis(es): The primary encounter diagnosis was Lower GI bleed. A diagnosis of Acute blood loss anemia was also pertinent to this visit. Past Medical History:  has a past medical history of NADJA (acute kidney injury) ([address]), Asthma, CAD (coronary artery disease), CHF (congestive heart failure) ([address]), Chronic anxiety, COPD (chronic obstructive pulmonary disease) ([address]), GERD (gastroesophageal reflux disease), Heart attack ([address]), History of blood transfusion, Hyperlipidemia, Hypertension, MRSA (methicillin resistant staph aureus) culture positive, OA (osteoarthritis), and Thyroid disease. Past Surgical History:  has a past surgical history that includes  section; Mastectomy, partial (Left); bronchoscopy (2019); Cardiac catheterization (2019); Coronary artery bypass graft (N/A, 2019); Colonoscopy (N/A, 2020); Sigmoidoscopy (2020); sigmoidoscopy (N/A, 2020); IR MIDLINE CATH (2021); Colonoscopy (N/A, 10/22/2021); Upper gastrointestinal endoscopy (N/A, 2023); Upper gastrointestinal endoscopy (N/A, 2023); and Upper gastrointestinal endoscopy (2023). Assessment   Assessment: Pt is an 81year old female admitted with GI bleed. Pt able to complete bed mobility and functional transfers with supervision and ambulate household distances with supervision without AD. Pt does demo increased work of breathing with mobility and reports SOB although O2 sats 90% or greater (on 2L); both pt and caregiver report SOB with activity is baseline.  Pt is currently functioning at baseline level of mobility as she was

## 2023-08-03 NOTE — ED NOTES
Paged Dr Tami Suarez @[phone] per Dr. Savage Cordova   RE: lower GI bleed  @2414 Dr. Damian Dubose called back and spoke with Dr. Jesus Reilly  08/03/23 3221

## 2023-08-03 NOTE — PROGRESS NOTES
Occupational Therapy  Facility/Department: [place] [address] - [facility]  Occupational Therapy Initial Assessment/Treatment     Name: Radha Stallworth  : 1940  MRN: [id number]  Date of Service: 8/3/2023    Discharge Recommendations:  24 hour supervision or assist  OT Equipment Recommendations  Other: pt has all recommended DME     Radha Stallworth scored a  on the AM-PAC ADL Inpatient form. At this time, no further OT is recommended upon discharge. Recommend patient returns to prior setting with prior services. Patient Diagnosis(es): The primary encounter diagnosis was Lower GI bleed. A diagnosis of Acute blood loss anemia was also pertinent to this visit. Past Medical History:  has a past medical history of NADJA (acute kidney injury) ([address]), Asthma, CAD (coronary artery disease), CHF (congestive heart failure) ([address]), Chronic anxiety, COPD (chronic obstructive pulmonary disease) ([address]), GERD (gastroesophageal reflux disease), Heart attack ([address]), History of blood transfusion, Hyperlipidemia, Hypertension, MRSA (methicillin resistant staph aureus) culture positive, OA (osteoarthritis), and Thyroid disease. Past Surgical History:  has a past surgical history that includes  section; Mastectomy, partial (Left); bronchoscopy (2019); Cardiac catheterization (2019); Coronary artery bypass graft (N/A, 2019); Colonoscopy (N/A, 2020); Sigmoidoscopy (2020); sigmoidoscopy (N/A, 2020); IR MIDLINE CATH (2021); Colonoscopy (N/A, 10/22/2021); Upper gastrointestinal endoscopy (N/A, 2023); Upper gastrointestinal endoscopy (N/A, 2023); and Upper gastrointestinal endoscopy (2023). Assessment   Assessment: Pt is a 81yr old female admitted for possible GI bleed. Pt reports baseline assist with ADLs. Pt currently functioning close to baseline this date. Pt completed mobility, ADLs and transfers with supervision.  Anticipate no further acute OT needs at this

## 2023-08-03 NOTE — ACP (ADVANCE CARE PLANNING)
Advance Care Planning     General Advance Care Planning (ACP) Conversation    Date of Conversation: 8/3/2023  Conducted with: Patient with Decision Making Capacity    Healthcare Decision Maker:    Primary Decision Maker: Eli Carpenter - Child - [phone]    Secondary Decision Maker: Gavin Patricio - Niece/Nephew - [phone]  Click here to complete Healthcare Decision Makers including selection of the Healthcare Decision Maker Relationship (ie \"Primary\"). Today we documented Decision Maker(s) consistent with Legal Next of Kin hierarchy.     Content/Action Overview:  Has NO ACP documents/care preferences - information provided, considering goals and options          Length of Voluntary ACP Conversation in minutes:  <16 minutes (Non-Billable)    VANIA Hathaway

## 2023-08-03 NOTE — CONSULTS
Gastroenterology Consult Note    Patient:   Leanna Little   :       Facility:     [address]  [address] [address]   Referring/PCP: Matthew Oates MD  Date:     8/3/2023  Consultant:   Davy Glover DO    Subjective: This 81 y.o. female was admitted 8/3/2023 with a diagnosis of \"Hematochezia [K92.1]  Acute blood loss anemia [D62]  Lower GI bleed [K92.2]\" and is seen in consultation regarding GI bleeding    82 yo female with COPD, HTN, hyperlipidemia, CAD s/p CABG, CHF, CKD3, afib with multiple admissions. Patient was admitted due to bright red blood per rectum. Patient states had recurrent episode today. Colonoscopy in  demonstrated hemorrhoids and rectal polyp.     Past Medical History:   Diagnosis Date    NADJA (acute kidney injury) ([address])     Asthma     CAD (coronary artery disease)     CHF (congestive heart failure) (HCC)     unsure    Chronic anxiety     COPD (chronic obstructive pulmonary disease) (HCC)     GERD (gastroesophageal reflux disease) 2021    Heart attack ([address]) 2019    History of blood transfusion     Hyperlipidemia     Hypertension     MRSA (methicillin resistant staph aureus) culture positive 2019    + resp cx    OA (osteoarthritis) 2014    Thyroid disease      Past Surgical History:   Procedure Laterality Date    BRONCHOSCOPY  2019    Dr. Betty Pizarro - w/BAL    CARDIAC CATHETERIZATION  2019    Dr. Lois Pérez N/A 2020    COLONOSCOPY DIAGNOSTIC performed by Juancarlos [facility] DO at [facility] N/A 10/22/2021    COLONOSCOPY POLYPECTOMY SNARE/COLD BIOPSY performed by Leif Gilbert MD at [facility] N/A 2019    OFF PUMP CORONARY ARTERY BYPASS GRAFTING X2, INTERNAL MAMMARY ARTERY, SAPHENOUS VEIN GRAFT performed by Lisha Diamond MD at [facility] CATH  2021

## 2023-08-03 NOTE — CARE COORDINATION
DC: (P) Yes  Would you like Case Management to discuss the discharge plan with any other family members/significant others, and if so, who? (P) Yes  Plans to Return to Present Housing: (P) Yes  Other Identified Issues/Barriers to RETURNING to current housing: none noted  Potential Assistance needed at discharge: (P) Home Care            Potential DME:    Patient expects to discharge to: (P) [address] [facility] for transportation at discharge: (P) Family    Financial    Payor: MEDICARE / Plan: MEDICARE PART A AND B / Product Type: *No Product type* /     Does insurance require precert for SNF: No    Potential assistance Purchasing Medications: (P) No  Meds-to-Beds request:        Megan Alcantar [address] [place], [place] - [address] [place] [address]  Phone: [phone] Fax: [phone]    [facility] [id number] - [address], [address] [facility] [phone] - f [phone]  [address] [place] [address]  [address] [facility] [address]  Phone: [phone] Fax: [phone]      Notes:    Factors facilitating achievement of predicted outcomes: Family support, Motivated, Cooperative, Pleasant, Sense of humor, and Has needed Durable Medical Equipment at home    Barriers to discharge: Medical complications    Additional Case Management Notes: Referred to patient for d/c planning. Spoke to patient and family. Patient is an 81year old female admitted for rectal bleed. Patient usually lives at home with son. Patient has assistance from family during the day and son assists in the evening. Patient reports she is independent in ADLs. Patient is current with [facility] for o2. Patient is current with [facility]. Patient denies other needs at this time. The Plan for Transition of Care is related to the following treatment goals of No admission diagnoses are documented for this encounter.     IF APPLICABLE: The Patient and/or patient representative Chaparro Newsome and her family were provided with a choice of provider and agrees with the discharge plan. Freedom of choice list with basic dialogue that supports the patient's individualized plan of care/goals and shares the quality data associated with the providers was provided to:     Patient Representative Name:       The Patient and/or Patient Representative Agree with the Discharge Plan?       VANIA Zhu, LEE-S  Case Management Department  Ph: [phone] Fax: [phone]

## 2023-08-03 NOTE — ED NOTES
0100: Called ED to received report. Informed that he may be leaving AMA, and they will call back. 0112: ED called and stated that the patient will not be coming to the floor. Tele 017 @ 4192

## 2023-08-03 NOTE — H&P
Hospital Medicine History and Physical      Chief Complaint:  BRBPR      History and Present Illness: The patient is a pleasant 81 Y F with a h/o COPD, HTN, HLD, CAD s/p CABG in 2019, CHF, CKD3, and Afib on rivaroxaban 15 mg po qd. It looks like she has been hospitalized a number of times this year:   - 1/2023: chest pain due to esophagitis, also AECOPD and a/c CHF.  - 5/2023: a/c CHF, AECOPD.  - 7/5/23: presented to [facility] ED with abdominal pain and vaginal drainage. CT showed uterine mass. Transferred to [facility] for gyn eval.  Gyn/onc consultant ended up saying they would deal with this as outpatient, so she discharged home on 7/8.  - 7/24/23: Dr. Payal Mccabe performed a hysteroscopy with D+C, found mucopurulent discharge, diagnosed pyometria, no malignancy found, treated with abx. The patient and family say that she has been doing really well at home over the last few days. She seems stronger than normal, has been walking well without any assistance devices, has been eating well. She had some dark diarrhea on 7/31, which seemed to resolve, but then a home health aid noticed that she had a bright red BM this morning. When asked, she only has her same chronic, mild abdominal discomfort which she has had for years. No fevers or chills. No n/v. She came to the ED and her Hb was 8.9, compared to recent baseline of 10. VSS. General appearance: No apparent distress, appears stated age and cooperative. HEENT: Pupils equal, round. Conjunctivae/corneas clear. Hard of hearing. Neck: No jugular venous distention. Respiratory:  Normal respiratory effort. Bilaterally without Rales/Wheezes/Rhonchi. Cardiovascular: Normal rate and regular rhythm with normal S1/S2 without murmurs, rubs or gallops. Abdomen: Soft, non-tender, non-distended with normal bowel sounds. Musculoskeletal: No cyanosis bilaterally. No edema. Without deformity. Skin: No jaundice. No rashes or lesions.   Neurologic:

## 2023-08-03 NOTE — PROGRESS NOTES
4 Eyes Skin Assessment     NAME:  Tara Smith  YOB: 1940  MEDICAL RECORD NUMBER:  [id number]    The patient is being assessed for  Admission    I agree that at least one RN has performed a thorough Head to Toe Skin Assessment on the patient. ALL assessment sites listed below have been assessed. Areas assessed by both nurses:    Head, Face, Ears, Shoulders, Back, Chest, Arms, Elbows, Hands, Sacrum. Buttock, Coccyx, Ischium, and Legs. Feet and Heels        Does the Patient have a Wound?  No noted wound(s)       Trey Prevention initiated by RN: Yes  Wound Care Orders initiated by RN: No    Pressure Injury (Stage 3,4, Unstageable, DTI, NWPT, and Complex wounds) if present, place Wound referral order by RN under : No    New Ostomies, if present place, Ostomy referral order under : No     Nurse 1 eSignature: Electronically signed by Gabriela Torres RN on 8/3/23 at 12:35 PM EDT    **SHARE this note so that the co-signing nurse can place an eSignature**    Nurse 2 eSignature: Electronically signed by Sb Lara RN on 8/3/23 at 3:59 PM EDT

## 2023-08-03 NOTE — ED PROVIDER NOTES
[address] [place] [address]  ED  EMERGENCY DEPARTMENT ENCOUNTER      Pt Name: Lucero Owens  MRN: [id number]  [facility] 1940  Date of evaluation: 8/3/2023  Provider: Rodrigo Schaffer MD    CHIEF COMPLAINT       Chief Complaint   Patient presents with    Rectal Bleeding     10 days ago D&C with vaginal bleeding X 10 days, 4 days ago rectal bleeding          HISTORY OF PRESENT ILLNESS   (Location/Symptom, Timing/Onset, Context/Setting, Quality, Duration, Modifying Factors, Severity)  Note limiting factors. Lucero Owens is a 81 y.o. female with past medical history of hypertension, coronary artery disease status post CABG, COPD, paroxysmal atrial fibrillation on anticoagulation and recently diagnosed pyometra status post D&C and drainage of pyometra here today with presumably rectal bleeding. Patient presents emergency department today from her nursing facility. Her care provider noticed that there was a large amount of blood in the toilet after the patient sat down to have a bowel movement. She is on Eliquis and was sent to the emergency department for evaluation. Patient states she feels fine. No abdominal pain at present. Has felt the need to defecate more frequently recently. She has not personally noticed any blood in her stool. She has not noticed any vaginal bleeding but does state that she was just recently diagnosed with an endometrial mass and after an exam under anesthesia was discovered to have a pyometra status post D&C and drainage on 7/24/2023. She has not had any significant vaginal bleeding since that time. No prior history of GI bleed. Does note that she has a history of hemorrhoids. [facility]    Nursing Notes were reviewed. REVIEW OF SYSTEMS    (2-9 systems for level 4, 10 or more for level 5)     Review of Systems    Please see HPI for pertinent positive and negative review of system findings. A full 10 system ROS was performed and otherwise negative.         PAST MEDICAL (TYLENOL) 500 MG TABLET    Take 1 tablet by mouth every 6 hours as needed for Pain    ALBUTEROL (PROVENTIL) (2.5 MG/3ML) 0.083% NEBULIZER SOLUTION    Take 3 mLs by nebulization every 4 hours as needed for Wheezing    ATORVASTATIN (LIPITOR) 40 MG TABLET    Take 1 tablet by mouth nightly    BUSPIRONE (BUSPAR) 5 MG TABLET    Take 1 tablet by mouth 3 times daily    FERROUS SULFATE (IRON 325) 325 (65 FE) MG TABLET    Take 1 tablet by mouth daily (with breakfast)    FLUTICASONE (FLONASE) 50 MCG/ACT NASAL SPRAY    1 spray by Each Nostril route daily as needed for Rhinitis    FLUTICASONE-UMECLIDIN-VILANT (TRELEGY ELLIPTA) 200-62.5-25 MCG/INH AEPB    Inhale 1 puff into the lungs daily    GUAIFENESIN (MUCINEX) 600 MG EXTENDED RELEASE TABLET    Take 1 tablet by mouth daily    MAGNESIUM OXIDE (MAG-OX) 400 (241.3 MG) MG TABS TABLET    Take 1 tablet by mouth daily    METOPROLOL TARTRATE (LOPRESSOR) 25 MG TABLET    Take 1 tablet by mouth 2 times daily    NITROGLYCERIN (NITROSTAT) 0.4 MG SL TABLET    Place 1 tablet under the tongue every 5 minutes as needed for Chest pain up to max of 3 total doses. If no relief after 1 dose, call 911. ONDANSETRON (ZOFRAN-ODT) 4 MG DISINTEGRATING TABLET    Take 1 tablet by mouth every 8 hours as needed for Nausea or Vomiting    OXYGEN    Inhale 2 L into the lungs 2-3    PANTOPRAZOLE (PROTONIX) 40 MG TABLET    Take 1 tablet by mouth 2 times daily (before meals)    ROFLUMILAST (DALIRESP) 500 MCG TABLET    Take 1 tablet by mouth daily    SERTRALINE (ZOLOFT) 50 MG TABLET    Take 1 tablet by mouth daily    TORSEMIDE (DEMADEX) 20 MG TABLET    Take 1 tablet by mouth daily       ALLERGIES     Latex and Codeine    FAMILY HISTORY       Family History   Problem Relation Age of Onset    Cancer Mother     Heart Disease Father     Stroke Father     Heart Disease Sister     Stroke Sister           SOCIAL HISTORY       Social History     Socioeconomic History    Marital status:       Spouse name: None

## 2023-08-04 LAB
ANION GAP SERPL CALCULATED.3IONS-SCNC: 12 MMOL/L (ref 3–16)
BUN SERPL-MCNC: 16 MG/DL (ref 7–20)
CALCIUM SERPL-MCNC: 9.3 MG/DL (ref 8.3–10.6)
CHLORIDE SERPL-SCNC: 95 MMOL/L (ref 99–110)
CO2 SERPL-SCNC: 28 MMOL/L (ref 21–32)
CREAT SERPL-MCNC: 1 MG/DL (ref 0.6–1.2)
DEPRECATED RDW RBC AUTO: 13.7 % (ref 12.4–15.4)
GFR SERPLBLD CREATININE-BSD FMLA CKD-EPI: 56 ML/MIN/{1.73_M2}
GLUCOSE SERPL-MCNC: 115 MG/DL (ref 70–99)
HCT VFR BLD AUTO: 24.7 % (ref 36–48)
HGB BLD-MCNC: 8.2 G/DL (ref 12–16)
MCH RBC QN AUTO: 30 PG (ref 26–34)
MCHC RBC AUTO-ENTMCNC: 33.4 G/DL (ref 31–36)
MCV RBC AUTO: 89.8 FL (ref 80–100)
PLATELET # BLD AUTO: 358 K/UL (ref 135–450)
PMV BLD AUTO: 7.1 FL (ref 5–10.5)
POTASSIUM SERPL-SCNC: 3.6 MMOL/L (ref 3.5–5.1)
RBC # BLD AUTO: 2.75 M/UL (ref 4–5.2)
SODIUM SERPL-SCNC: 135 MMOL/L (ref 136–145)
WBC # BLD AUTO: 8.8 K/UL (ref 4–11)

## 2023-08-04 PROCEDURE — C9113 INJ PANTOPRAZOLE SODIUM, VIA: HCPCS | Performed by: INTERNAL MEDICINE

## 2023-08-04 PROCEDURE — 6360000002 HC RX W HCPCS: Performed by: INTERNAL MEDICINE

## 2023-08-04 PROCEDURE — 80048 BASIC METABOLIC PNL TOTAL CA: CPT

## 2023-08-04 PROCEDURE — 6370000000 HC RX 637 (ALT 250 FOR IP): Performed by: INTERNAL MEDICINE

## 2023-08-04 PROCEDURE — 2580000003 HC RX 258: Performed by: INTERNAL MEDICINE

## 2023-08-04 PROCEDURE — 6370000000 HC RX 637 (ALT 250 FOR IP): Performed by: NURSE PRACTITIONER

## 2023-08-04 PROCEDURE — 94640 AIRWAY INHALATION TREATMENT: CPT

## 2023-08-04 PROCEDURE — 85027 COMPLETE CBC AUTOMATED: CPT

## 2023-08-04 PROCEDURE — 94760 N-INVAS EAR/PLS OXIMETRY 1: CPT

## 2023-08-04 PROCEDURE — 1200000000 HC SEMI PRIVATE

## 2023-08-04 PROCEDURE — 2700000000 HC OXYGEN THERAPY PER DAY

## 2023-08-04 RX ORDER — HYDROXYZINE PAMOATE 25 MG/1
25 CAPSULE ORAL ONCE
Status: COMPLETED | OUTPATIENT
Start: 2023-08-04 | End: 2023-08-04

## 2023-08-04 RX ORDER — CALCIUM CARBONATE 500 MG/1
500 TABLET, CHEWABLE ORAL 3 TIMES DAILY PRN
Status: DISCONTINUED | OUTPATIENT
Start: 2023-08-04 | End: 2023-08-07 | Stop reason: HOSPADM

## 2023-08-04 RX ORDER — IPRATROPIUM BROMIDE AND ALBUTEROL SULFATE 2.5; .5 MG/3ML; MG/3ML
1 SOLUTION RESPIRATORY (INHALATION) EVERY 4 HOURS PRN
Status: DISCONTINUED | OUTPATIENT
Start: 2023-08-04 | End: 2023-08-07 | Stop reason: HOSPADM

## 2023-08-04 RX ADMIN — Medication 400 MG: at 09:40

## 2023-08-04 RX ADMIN — PANTOPRAZOLE SODIUM 40 MG: 40 INJECTION, POWDER, FOR SOLUTION INTRAVENOUS at 05:55

## 2023-08-04 RX ADMIN — PANTOPRAZOLE SODIUM 40 MG: 40 INJECTION, POWDER, FOR SOLUTION INTRAVENOUS at 15:06

## 2023-08-04 RX ADMIN — ACETAMINOPHEN 650 MG: 325 TABLET ORAL at 09:41

## 2023-08-04 RX ADMIN — Medication 2 PUFF: at 08:43

## 2023-08-04 RX ADMIN — CALCIUM CARBONATE 500 MG: 500 TABLET, CHEWABLE ORAL at 20:49

## 2023-08-04 RX ADMIN — ATORVASTATIN CALCIUM 40 MG: 40 TABLET, FILM COATED ORAL at 20:47

## 2023-08-04 RX ADMIN — ALBUTEROL SULFATE 2.5 MG: 2.5 SOLUTION RESPIRATORY (INHALATION) at 13:33

## 2023-08-04 RX ADMIN — SODIUM CHLORIDE, PRESERVATIVE FREE 10 ML: 5 INJECTION INTRAVENOUS at 09:43

## 2023-08-04 RX ADMIN — ALBUTEROL SULFATE 2.5 MG: 2.5 SOLUTION RESPIRATORY (INHALATION) at 08:43

## 2023-08-04 RX ADMIN — METOPROLOL TARTRATE 25 MG: 25 TABLET, FILM COATED ORAL at 09:42

## 2023-08-04 RX ADMIN — TORSEMIDE 20 MG: 20 TABLET ORAL at 09:40

## 2023-08-04 RX ADMIN — Medication 2 PUFF: at 20:03

## 2023-08-04 RX ADMIN — SERTRALINE 50 MG: 50 TABLET, FILM COATED ORAL at 09:40

## 2023-08-04 RX ADMIN — HYDROXYZINE PAMOATE 25 MG: 25 CAPSULE ORAL at 20:49

## 2023-08-04 RX ADMIN — ROFLUMILAST 500 MCG: 500 TABLET ORAL at 09:42

## 2023-08-04 RX ADMIN — BUSPIRONE HYDROCHLORIDE 5 MG: 5 TABLET ORAL at 20:48

## 2023-08-04 RX ADMIN — BUSPIRONE HYDROCHLORIDE 5 MG: 5 TABLET ORAL at 09:40

## 2023-08-04 RX ADMIN — BUSPIRONE HYDROCHLORIDE 5 MG: 5 TABLET ORAL at 15:06

## 2023-08-04 RX ADMIN — GUAIFENESIN 600 MG: 600 TABLET ORAL at 09:40

## 2023-08-04 RX ADMIN — ALBUTEROL SULFATE 2.5 MG: 2.5 SOLUTION RESPIRATORY (INHALATION) at 19:58

## 2023-08-04 RX ADMIN — SODIUM CHLORIDE, PRESERVATIVE FREE 10 ML: 5 INJECTION INTRAVENOUS at 20:47

## 2023-08-04 ASSESSMENT — ENCOUNTER SYMPTOMS
ALLERGIC/IMMUNOLOGIC NEGATIVE: 1
EYES NEGATIVE: 1
RESPIRATORY NEGATIVE: 1
BLOOD IN STOOL: 1

## 2023-08-04 ASSESSMENT — PAIN DESCRIPTION - LOCATION
LOCATION: BACK;LEG

## 2023-08-04 ASSESSMENT — PAIN SCALES - GENERAL
PAINLEVEL_OUTOF10: 3
PAINLEVEL_OUTOF10: 9
PAINLEVEL_OUTOF10: 3
PAINLEVEL_OUTOF10: 0
PAINLEVEL_OUTOF10: 9

## 2023-08-04 ASSESSMENT — PAIN DESCRIPTION - ONSET: ONSET: AWAKENED FROM SLEEP

## 2023-08-04 ASSESSMENT — PAIN DESCRIPTION - FREQUENCY: FREQUENCY: CONTINUOUS

## 2023-08-04 ASSESSMENT — PAIN DESCRIPTION - DESCRIPTORS: DESCRIPTORS: ACHING

## 2023-08-04 ASSESSMENT — PAIN DESCRIPTION - ORIENTATION
ORIENTATION: RIGHT;LEFT
ORIENTATION: RIGHT;LEFT

## 2023-08-04 ASSESSMENT — PAIN SCALES - WONG BAKER: WONGBAKER_NUMERICALRESPONSE: 0

## 2023-08-04 NOTE — PROGRESS NOTES
Comprehensive Nutrition Assessment    Type and Reason for Visit:  Initial, Positive Nutrition Screen    Nutrition Recommendations/Plan:   Continue clear liquid diet, ADAT per MD  Add ensure clear w/ meals  RD to order updated weight  Monitor nutrition adequacy, pertinent labs, bowel habits, wt changes, and clinical progress     Malnutrition Assessment:  Malnutrition Status: At risk for malnutrition (Comment) (Difficult to assess NFPE) (08/04/23 1236)    Context:  Acute Illness     Findings of the 6 clinical characteristics of malnutrition:  Energy Intake:  Mild decrease in energy intake (Comment)    Nutrition Assessment:    Positive nutrition screen for poor appetite and intake: 81 Y F with a h/o COPD, HTN, HLD, CAD s/p CABG in 2019, CHF, CKD3, and Afib admitted w/ GIB. GI consulted, colonscopy on 8/4 cancelled d/t pt unable to tolerate bowel prep. On clear liquid diet, good acceptance of clear liquid trays, %. Pt reports poor appetite since 7/24, reports appetite improved last week and decreased 1 day PTA. REports appetite decreased d/t diarrhea. Wt varying in EMR, difficult to assess. Pt willing to trial ensure clear, RD to add TID. Monitor for diet advancement. Will monitor. Nutrition Related Findings:    Na 135. BM yesterday Wound Type: None       Current Nutrition Intake & Therapies:    Average Meal Intake: 51-75%, %  Average Supplements Intake: None Ordered  ADULT DIET; Clear Liquid  ADULT ORAL NUTRITION SUPPLEMENT; Breakfast, Lunch, Dinner; Clear Liquid Oral Supplement    Anthropometric Measures:  Height: 5' 4\" (162.6 cm)  Ideal Body Weight (IBW): 120 lbs (55 kg)       Current Body Weight: 116 lb (52.6 kg), 96.7 % IBW.  Weight Source: Bed Scale  Current BMI (kg/m2): 19.9        Weight Adjustment For: No Adjustment                 BMI Categories: Underweight (BMI less than 22) age over 72    Estimated Daily Nutrient Needs:  Energy Requirements Based On: Kcal/kg (25-30 kcals/kg)  Weight Used for

## 2023-08-04 NOTE — PROGRESS NOTES
Physician Progress Note      PATIENT:               Jodi Chung  CSN #:                  [id number]  :                       1940  ADMIT DATE:       8/3/2023 5:25 AM  [address] [facility] DATE:  Raquel Hogan  PROVIDER #:        Santo Tripathi MD          QUERY TEXT:    Pt admitted with GI bleed. Pt noted to have \"Chronic oxygen requirement\" per   H&P. If possible, please document in the progress notes and discharge summary   if you are evaluating and/or treating any of the following: The medical record reflects the following:  Risk Factors: COPD, CAD  Clinical Indicators: \"Chronic oxygen requirement\", noted to be on 2 L NC   continuously, RR 16-25, SPO2 %  Treatment: supplemental oxygen, supportive care    Thank you,  Talha Rocha RN, [email or web address]  Options provided:  -- Chronic respiratory failure with hypoxia  -- Other - I will add my own diagnosis  -- Disagree - Not applicable / Not valid  -- Disagree - Clinically unable to determine / Unknown  -- Refer to Clinical Documentation Reviewer    PROVIDER RESPONSE TEXT:    This patient has chronic respiratory failure with hypoxia.     Query created by: Talha Rocha on 2023 12:23 PM      Electronically signed by:  Santo Tripathi MD 2023 2:17 PM

## 2023-08-04 NOTE — CARE COORDINATION
Chart reviewed day 1. Care provided by GI and IM. Pt lives with son and is pretty independent. She has [facility] and O2 with [employer]. Pt was to have colonoscopy today but could not tolerate the prep. Will resume care with [facility] but will need orders when D/Jean. Will continue to follow course for needs.  Alissa Hahn RN

## 2023-08-04 NOTE — CARE COORDINATION
[facility]  Patient is active with [facility], Will follow for discharge to home with orders to resume care.     Sn/pt/ot    Donovan Glover RN, BSN CTN  [facility] [phone]

## 2023-08-04 NOTE — PROGRESS NOTES
Patient crying, states she feels terrible. Complaining of nausea, and difficulty breathing. Nasal cannula on 2L, pulse ox 96%. Got patient up to chair, stated she needed to have a bowel movement, and then wanted to get back into bed and sleep. BM unsuccessful. So far she has had 1.5 cups of golytely, with one episode of watery/spit emesis.

## 2023-08-04 NOTE — PROGRESS NOTES
assistance devices, has been eating well. She had some dark diarrhea on 7/31, which seemed to resolve, but then a home health aid noticed that she had a bright red BM this morning. When asked, she only has her same chronic, mild abdominal discomfort which she has had for years. No fevers or chills. No n/v. She came to the ED and her Hb was 8.9, compared to recent baseline of 10. VSS. GIB, at least in part due to her rivaroxaban, with ABLA. Trend Hb.  PPI. Resume her PO ferrous sulfate upon discharge. GI consulted. Patient couldn't tolerate bowel prep, 8/4 colonoscopy canceled. PAF. Held rivaroxaban until further GI input. CAD. She is not on any chronic antiplatelets. If we don't resume the rivaroxaban then we would probably need to start aspirin. Continue clopidogrel and metoprolol. HTN, chronic diastolic CHF, EF normal as of 5/2023. Continue torsemide, metoprolol. She is not on empagliflozin, spironolactone, or Entresto, probably due to renal insufficiency and lower BPs, defer to outpatient management. COPD. Inhaled bronchodilators, roflumilast.  Chronic oxygen requirement. Diet: ADULT DIET; Clear Liquid  DVT Prophylaxis: SCDs  Appropriate for A1: no  Disposition: PT/OT rec'd 24-hr assistance. She can return home with family when we can be more confident that her bleeding has stopped. Perhaps 8/5 , pending GI input, stool color, and Hb trend.       -----------------------------------------------------------------------                              PCP: Alcon Colin MD    Date of Admission: 8/3/2023  Current Hospital Day: 2    Chief Admission Complaint: Rectal Bleeding (10 days ago D&C with vaginal bleeding X 10 days, 4 days ago rectal bleeding )       Hematochezia    Medications:  Personally reviewed in detail in conjunction w/ labs as documented for evidence of drug toxicity.      Infusion Medications    sodium chloride       Scheduled Medications atorvastatin  40 mg Oral Nightly    busPIRone  5 mg Oral TID    [Held by provider] ferrous sulfate  325 mg Oral Daily with breakfast    mometasone-formoterol  2 puff Inhalation BID    guaiFENesin  600 mg Oral Daily    magnesium oxide  400 mg Oral Daily    metoprolol tartrate  25 mg Oral BID    pantoprazole  40 mg IntraVENous BID AC    Roflumilast  500 mcg Oral Daily    sertraline  50 mg Oral Daily    torsemide  20 mg Oral Daily    sodium chloride flush  5-40 mL IntraVENous 2 times per day    albuterol  2.5 mg Nebulization TID    hydrOXYzine pamoate  25 mg Oral Once     PRN Meds: ipratropium 0.5 mg-albuterol 2.5 mg, fluticasone, sodium chloride flush, sodium chloride, polyethylene glycol, acetaminophen **OR** acetaminophen, prochlorperazine      Intake/Output Summary (Last 24 hours) at 8/4/2023 1113  Last data filed at 8/4/2023 0356  Gross per 24 hour   Intake 360 ml   Output 100 ml   Net 260 ml       Labs: Personally reviewed and interpreted for clinical significance. Recent Labs     08/03/23  0532 08/03/23  1457 08/03/23  2144 08/04/23  0727   WBC 14.4*  --   --  8.8   HGB 8.9* 8.6* 7.9* 8.2*   HCT 26.9* 25.2* 24.1* 24.7*     --   --  358     Recent Labs     08/03/23  0532 08/04/23  0727   * 135*   K 3.6 3.6   CL 94* 95*   CO2 24 28   BUN 21* 16   CREATININE 1.4* 1.0   CALCIUM 9.6 9.3     Recent Labs     08/03/23  0532   AST 11*   ALT <5*   BILITOT <0.2   ALKPHOS 80     Recent Labs     08/03/23  0100   INR 1.13     No results for input(s): CKTOTAL, TROPHS in the last 72 hours.     Urinalysis:      Lab Results   Component Value Date/Time    NITRU Negative 07/05/2023 06:11 PM    WBCUA >100 07/05/2023 06:11 PM    BACTERIA 1+ 07/05/2023 06:11 PM    RBCUA 11-20 07/05/2023 06:11 PM    BLOODU LARGE 07/05/2023 06:11 PM    SPECGRAV 1.010 07/05/2023 06:11 PM    GLUCOSEU Negative 07/05/2023 06:11 PM       Consults:     IP CONSULT TO GI  IP CONSULT TO RESPIRATORY CARE    I personally reviewed Lab Studies and

## 2023-08-05 LAB
DEPRECATED RDW RBC AUTO: 13.5 % (ref 12.4–15.4)
HCT VFR BLD AUTO: 22.5 % (ref 36–48)
HCT VFR BLD AUTO: 24.7 % (ref 36–48)
HGB BLD-MCNC: 7.7 G/DL (ref 12–16)
HGB BLD-MCNC: 8.2 G/DL (ref 12–16)
MCH RBC QN AUTO: 31.2 PG (ref 26–34)
MCHC RBC AUTO-ENTMCNC: 34.2 G/DL (ref 31–36)
MCV RBC AUTO: 91 FL (ref 80–100)
PLATELET # BLD AUTO: 333 K/UL (ref 135–450)
PMV BLD AUTO: 7.1 FL (ref 5–10.5)
RBC # BLD AUTO: 2.47 M/UL (ref 4–5.2)
WBC # BLD AUTO: 9.4 K/UL (ref 4–11)

## 2023-08-05 PROCEDURE — C9113 INJ PANTOPRAZOLE SODIUM, VIA: HCPCS | Performed by: INTERNAL MEDICINE

## 2023-08-05 PROCEDURE — 85027 COMPLETE CBC AUTOMATED: CPT

## 2023-08-05 PROCEDURE — 6360000002 HC RX W HCPCS: Performed by: INTERNAL MEDICINE

## 2023-08-05 PROCEDURE — 85014 HEMATOCRIT: CPT

## 2023-08-05 PROCEDURE — 1200000000 HC SEMI PRIVATE

## 2023-08-05 PROCEDURE — 85018 HEMOGLOBIN: CPT

## 2023-08-05 PROCEDURE — 36415 COLL VENOUS BLD VENIPUNCTURE: CPT

## 2023-08-05 PROCEDURE — 6370000000 HC RX 637 (ALT 250 FOR IP): Performed by: INTERNAL MEDICINE

## 2023-08-05 PROCEDURE — 94761 N-INVAS EAR/PLS OXIMETRY MLT: CPT

## 2023-08-05 PROCEDURE — 93005 ELECTROCARDIOGRAM TRACING: CPT | Performed by: HOSPITALIST

## 2023-08-05 PROCEDURE — 94640 AIRWAY INHALATION TREATMENT: CPT

## 2023-08-05 PROCEDURE — 6370000000 HC RX 637 (ALT 250 FOR IP)

## 2023-08-05 PROCEDURE — 2700000000 HC OXYGEN THERAPY PER DAY

## 2023-08-05 PROCEDURE — 6370000000 HC RX 637 (ALT 250 FOR IP): Performed by: NURSE PRACTITIONER

## 2023-08-05 PROCEDURE — 2580000003 HC RX 258: Performed by: INTERNAL MEDICINE

## 2023-08-05 RX ORDER — BISACODYL 5 MG/1
10 TABLET, DELAYED RELEASE ORAL DAILY
Status: DISCONTINUED | OUTPATIENT
Start: 2023-08-05 | End: 2023-08-06

## 2023-08-05 RX ORDER — MECOBALAMIN 5000 MCG
5 TABLET,DISINTEGRATING ORAL NIGHTLY PRN
Status: DISCONTINUED | OUTPATIENT
Start: 2023-08-05 | End: 2023-08-07 | Stop reason: HOSPADM

## 2023-08-05 RX ORDER — POLYETHYLENE GLYCOL 3350 17 G/17G
17 POWDER, FOR SOLUTION ORAL 2 TIMES DAILY
Status: DISCONTINUED | OUTPATIENT
Start: 2023-08-05 | End: 2023-08-07 | Stop reason: HOSPADM

## 2023-08-05 RX ADMIN — SODIUM CHLORIDE, PRESERVATIVE FREE 10 ML: 5 INJECTION INTRAVENOUS at 08:58

## 2023-08-05 RX ADMIN — Medication 5 MG: at 02:49

## 2023-08-05 RX ADMIN — BUSPIRONE HYDROCHLORIDE 5 MG: 5 TABLET ORAL at 21:07

## 2023-08-05 RX ADMIN — ACETAMINOPHEN 650 MG: 325 TABLET ORAL at 21:08

## 2023-08-05 RX ADMIN — IPRATROPIUM BROMIDE AND ALBUTEROL SULFATE 1 DOSE: .5; 3 SOLUTION RESPIRATORY (INHALATION) at 00:02

## 2023-08-05 RX ADMIN — BISACODYL 10 MG: 5 TABLET, COATED ORAL at 11:30

## 2023-08-05 RX ADMIN — ACETAMINOPHEN 650 MG: 325 TABLET ORAL at 11:30

## 2023-08-05 RX ADMIN — ALBUTEROL SULFATE 2.5 MG: 2.5 SOLUTION RESPIRATORY (INHALATION) at 08:20

## 2023-08-05 RX ADMIN — POLYETHYLENE GLYCOL 3350 17 G: 17 POWDER, FOR SOLUTION ORAL at 21:07

## 2023-08-05 RX ADMIN — SERTRALINE 50 MG: 50 TABLET, FILM COATED ORAL at 08:58

## 2023-08-05 RX ADMIN — SODIUM CHLORIDE, PRESERVATIVE FREE 10 ML: 5 INJECTION INTRAVENOUS at 21:15

## 2023-08-05 RX ADMIN — IPRATROPIUM BROMIDE AND ALBUTEROL SULFATE 1 DOSE: .5; 3 SOLUTION RESPIRATORY (INHALATION) at 04:05

## 2023-08-05 RX ADMIN — POLYETHYLENE GLYCOL 3350 17 G: 17 POWDER, FOR SOLUTION ORAL at 13:47

## 2023-08-05 RX ADMIN — BUSPIRONE HYDROCHLORIDE 5 MG: 5 TABLET ORAL at 08:58

## 2023-08-05 RX ADMIN — Medication 5 MG: at 22:24

## 2023-08-05 RX ADMIN — Medication 2 PUFF: at 08:20

## 2023-08-05 RX ADMIN — PANTOPRAZOLE SODIUM 40 MG: 40 INJECTION, POWDER, FOR SOLUTION INTRAVENOUS at 06:35

## 2023-08-05 RX ADMIN — ATORVASTATIN CALCIUM 40 MG: 40 TABLET, FILM COATED ORAL at 21:07

## 2023-08-05 RX ADMIN — Medication 400 MG: at 08:58

## 2023-08-05 RX ADMIN — Medication 2 PUFF: at 20:39

## 2023-08-05 RX ADMIN — ROFLUMILAST 500 MCG: 500 TABLET ORAL at 08:58

## 2023-08-05 RX ADMIN — GUAIFENESIN 600 MG: 600 TABLET ORAL at 08:57

## 2023-08-05 RX ADMIN — IPRATROPIUM BROMIDE AND ALBUTEROL SULFATE 1 DOSE: .5; 3 SOLUTION RESPIRATORY (INHALATION) at 15:35

## 2023-08-05 RX ADMIN — PANTOPRAZOLE SODIUM 40 MG: 40 INJECTION, POWDER, FOR SOLUTION INTRAVENOUS at 16:45

## 2023-08-05 RX ADMIN — BUSPIRONE HYDROCHLORIDE 5 MG: 5 TABLET ORAL at 16:45

## 2023-08-05 RX ADMIN — METOPROLOL TARTRATE 25 MG: 25 TABLET, FILM COATED ORAL at 21:07

## 2023-08-05 RX ADMIN — ALBUTEROL SULFATE 2.5 MG: 2.5 SOLUTION RESPIRATORY (INHALATION) at 20:39

## 2023-08-05 ASSESSMENT — PAIN DESCRIPTION - ORIENTATION: ORIENTATION: OTHER (COMMENT)

## 2023-08-05 ASSESSMENT — PAIN SCALES - GENERAL
PAINLEVEL_OUTOF10: 0
PAINLEVEL_OUTOF10: 3
PAINLEVEL_OUTOF10: 8
PAINLEVEL_OUTOF10: 0

## 2023-08-05 ASSESSMENT — ENCOUNTER SYMPTOMS
ALLERGIC/IMMUNOLOGIC NEGATIVE: 1
EYES NEGATIVE: 1
BLOOD IN STOOL: 1
RESPIRATORY NEGATIVE: 1

## 2023-08-05 ASSESSMENT — PAIN - FUNCTIONAL ASSESSMENT: PAIN_FUNCTIONAL_ASSESSMENT: ACTIVITIES ARE NOT PREVENTED

## 2023-08-05 ASSESSMENT — PAIN DESCRIPTION - ONSET: ONSET: ON-GOING

## 2023-08-05 ASSESSMENT — PAIN DESCRIPTION - PAIN TYPE: TYPE: ACUTE PAIN

## 2023-08-05 ASSESSMENT — PAIN DESCRIPTION - FREQUENCY: FREQUENCY: INTERMITTENT

## 2023-08-05 ASSESSMENT — PAIN DESCRIPTION - DESCRIPTORS: DESCRIPTORS: ACHING

## 2023-08-05 ASSESSMENT — PAIN DESCRIPTION - LOCATION: LOCATION: HEAD

## 2023-08-05 NOTE — PROGRESS NOTES
Perfect serve Dr. Cam Rodriguez: EKG: sinus tachycardia with premature atrial compleses with aberrant conduction left axis deviation pulmonary disease pattern ST & T wave abnormality, consider lateral ischemia.  HR currently 120's  Unread

## 2023-08-05 NOTE — PROGRESS NOTES
Perfect serve Dr. Katie Josue: patient mews 3. HR increasing 111-140's. pressures stable.  ordering STAT EKG per protocol

## 2023-08-05 NOTE — PROGRESS NOTES
V2.0    [facility] Progress Note      Name:  Alejandro Rocha /Age/Sex: 1940  (81 y.o. female)   MRN & CSN:  [id number] & [id number] Encounter Date/Time: 2023 9:36 AM EDT   Location:  [facility]/0370-01 PCP: Chuck Durant MD     [address] Linda Dickerson MD       Hospital Day: 3    Assessment and Recommendations   Alejandro Rocha is a 81 y.o. female with pmh of A-fib on Xarelto, COPD, hypertension, who presents with Hematochezia      Plan:   Acute lower GIB-hematochezia  Acute blood loss anemia associated Xarelto  Patient couldn't tolerate bowel prep,  colonoscopy canceled, rescheduled for Monday as she is now willing to try a slow prep  Continue to monitor H&H and transfuse as needed     Paroxysmal A-fib. .  In sinus rhythm -held rivaroxaban due to GI bleed, continue metoprolol     CAD. She is not on any chronic antiplatelets. Metoprolol statin     HTN,   chronic diastolic CHF, EF normal as of 2023. Continue torsemide, metoprolol. COPD without acute decompensation  Chronic respiratory failure with hypoxia and COPD  Continue inhaled bronchodilators, roflumilast.  Chronic oxygen requirement. Disposition--Home likely Monday for colonoscopy if stable    Diet ADULT DIET; Clear Liquid  ADULT ORAL NUTRITION SUPPLEMENT; Breakfast, Lunch, Dinner; Clear Liquid Oral Supplement   DVT Prophylaxis [] Lovenox, []  Heparin, [] SCDs, [] Ambulation,  [] Eliquis, [] Xarelto  [] Coumadin   Code Status Full Code       Surrogate Decision Maker/ POA             Subjective:     Chief Complaint:       No new complaints this morning. Patient has small bowel movement with no blood in it. No abdominal pain    Review of Systems:      Pertinent positives and negatives discussed in HPI    Objective:      Intake/Output Summary (Last 24 hours) at 2023 0936  Last data filed at 2023 2343  Gross per 24 hour   Intake 340 ml   Output 180 ml   Net 160 ml      Vitals:   Vitals:    23 0003 23 0230

## 2023-08-05 NOTE — CONSULTS
Gastroenterology Progress Note    Patient:   Rosa Willett   :    1940   Facility:     [address]  [address] [address]   Referring/PCP: Ann Alfaro MD  Date:     2023  Consultant:   Marco Lopez MD    Subjective: This 81 y.o. female was admitted 8/3/2023 with a diagnosis of \"Hematochezia [K92.1]  Acute blood loss anemia [D62]  Lower GI bleed [K92.2]\" and is seen in consultation regarding GI bleeding    80 yo female with COPD, HTN, hyperlipidemia, CAD s/p CABG, CHF, CKD3, afib with multiple admissions. Patient was admitted due to bright red blood per rectum. Patient states had recurrent episode today. Colonoscopy in  demonstrated hemorrhoids and rectal polyp.     Past Medical History:   Diagnosis Date    NADJA (acute kidney injury) ([address])     Asthma     CAD (coronary artery disease)     CHF (congestive heart failure) (HCC)     unsure    Chronic anxiety     COPD (chronic obstructive pulmonary disease) (HCC)     GERD (gastroesophageal reflux disease) 2021    Heart attack ([address]) 2019    History of blood transfusion     Hyperlipidemia     Hypertension     MRSA (methicillin resistant staph aureus) culture positive 2019    + resp cx    OA (osteoarthritis) 2014    Thyroid disease      Past Surgical History:   Procedure Laterality Date    BRONCHOSCOPY  2019    Dr. Albino Spangler - w/BAL    CARDIAC CATHETERIZATION  2019    Dr. Witt Reasons N/A 2020    COLONOSCOPY DIAGNOSTIC performed by Roman Kevin DO at [facility] N/A 10/22/2021    COLONOSCOPY POLYPECTOMY SNARE/COLD BIOPSY performed by Sunday Keller MD at [facility] N/A 2019    OFF PUMP CORONARY ARTERY BYPASS GRAFTING X2, INTERNAL MAMMARY ARTERY, SAPHENOUS VEIN GRAFT performed by Breonna Szymanski MD at [address] [place] [address]  2021 with COPD, HTN, hyperlipidemia, CAD s/p CABG, CHF, CKD3, afib with hematochezia and drop in H/H to 7.7/23. Colonoscopy in 2021 had demonstrated hemorrhoids and rectal polyp.      Plan:   The colonoscopy previously canceled due to patient refuseing to take the prep will now be scheduled on Monday because the patient agrees to have a slow prep until then  Will follow      Mervat Spears MD  11:09 AM 8/5/2023            [address] [facility]    [address]      [facility]     Phone: [phone]     Fax: [phone]

## 2023-08-06 LAB
ANION GAP SERPL CALCULATED.3IONS-SCNC: 12 MMOL/L (ref 3–16)
ANION GAP SERPL CALCULATED.3IONS-SCNC: 13 MMOL/L (ref 3–16)
BASOPHILS # BLD: 0 K/UL (ref 0–0.2)
BASOPHILS NFR BLD: 0.3 %
BUN SERPL-MCNC: 12 MG/DL (ref 7–20)
BUN SERPL-MCNC: 9 MG/DL (ref 7–20)
CALCIUM SERPL-MCNC: 9 MG/DL (ref 8.3–10.6)
CALCIUM SERPL-MCNC: 9.4 MG/DL (ref 8.3–10.6)
CHLORIDE SERPL-SCNC: 94 MMOL/L (ref 99–110)
CHLORIDE SERPL-SCNC: 94 MMOL/L (ref 99–110)
CO2 SERPL-SCNC: 24 MMOL/L (ref 21–32)
CO2 SERPL-SCNC: 26 MMOL/L (ref 21–32)
CREAT SERPL-MCNC: 1 MG/DL (ref 0.6–1.2)
CREAT SERPL-MCNC: 1.1 MG/DL (ref 0.6–1.2)
DEPRECATED RDW RBC AUTO: 13.6 % (ref 12.4–15.4)
EOSINOPHIL # BLD: 0.3 K/UL (ref 0–0.6)
EOSINOPHIL NFR BLD: 2.4 %
GFR SERPLBLD CREATININE-BSD FMLA CKD-EPI: 50 ML/MIN/{1.73_M2}
GFR SERPLBLD CREATININE-BSD FMLA CKD-EPI: 56 ML/MIN/{1.73_M2}
GLUCOSE SERPL-MCNC: 85 MG/DL (ref 70–99)
GLUCOSE SERPL-MCNC: 88 MG/DL (ref 70–99)
HCT VFR BLD AUTO: 23.6 % (ref 36–48)
HGB BLD-MCNC: 7.8 G/DL (ref 12–16)
LYMPHOCYTES # BLD: 1.4 K/UL (ref 1–5.1)
LYMPHOCYTES NFR BLD: 12.3 %
MCH RBC QN AUTO: 29.7 PG (ref 26–34)
MCHC RBC AUTO-ENTMCNC: 33 G/DL (ref 31–36)
MCV RBC AUTO: 90.1 FL (ref 80–100)
MONOCYTES # BLD: 0.5 K/UL (ref 0–1.3)
MONOCYTES NFR BLD: 4.4 %
NEUTROPHILS # BLD: 9.4 K/UL (ref 1.7–7.7)
NEUTROPHILS NFR BLD: 80.6 %
PLATELET # BLD AUTO: 373 K/UL (ref 135–450)
PMV BLD AUTO: 7.1 FL (ref 5–10.5)
POTASSIUM SERPL-SCNC: 2.7 MMOL/L (ref 3.5–5.1)
POTASSIUM SERPL-SCNC: 3.2 MMOL/L (ref 3.5–5.1)
RBC # BLD AUTO: 2.62 M/UL (ref 4–5.2)
SODIUM SERPL-SCNC: 131 MMOL/L (ref 136–145)
SODIUM SERPL-SCNC: 132 MMOL/L (ref 136–145)
WBC # BLD AUTO: 11.6 K/UL (ref 4–11)

## 2023-08-06 PROCEDURE — 94640 AIRWAY INHALATION TREATMENT: CPT

## 2023-08-06 PROCEDURE — 6360000002 HC RX W HCPCS: Performed by: INTERNAL MEDICINE

## 2023-08-06 PROCEDURE — 6370000000 HC RX 637 (ALT 250 FOR IP): Performed by: NURSE PRACTITIONER

## 2023-08-06 PROCEDURE — 2580000003 HC RX 258: Performed by: HOSPITALIST

## 2023-08-06 PROCEDURE — 1200000000 HC SEMI PRIVATE

## 2023-08-06 PROCEDURE — 6370000000 HC RX 637 (ALT 250 FOR IP): Performed by: INTERNAL MEDICINE

## 2023-08-06 PROCEDURE — 6360000002 HC RX W HCPCS: Performed by: HOSPITALIST

## 2023-08-06 PROCEDURE — 80048 BASIC METABOLIC PNL TOTAL CA: CPT

## 2023-08-06 PROCEDURE — C9113 INJ PANTOPRAZOLE SODIUM, VIA: HCPCS | Performed by: INTERNAL MEDICINE

## 2023-08-06 PROCEDURE — 85025 COMPLETE CBC W/AUTO DIFF WBC: CPT

## 2023-08-06 PROCEDURE — 6370000000 HC RX 637 (ALT 250 FOR IP): Performed by: HOSPITALIST

## 2023-08-06 PROCEDURE — 6370000000 HC RX 637 (ALT 250 FOR IP)

## 2023-08-06 PROCEDURE — 2580000003 HC RX 258: Performed by: INTERNAL MEDICINE

## 2023-08-06 PROCEDURE — 94761 N-INVAS EAR/PLS OXIMETRY MLT: CPT

## 2023-08-06 PROCEDURE — 36415 COLL VENOUS BLD VENIPUNCTURE: CPT

## 2023-08-06 PROCEDURE — 2700000000 HC OXYGEN THERAPY PER DAY

## 2023-08-06 RX ORDER — POTASSIUM CHLORIDE 20 MEQ/1
40 TABLET, EXTENDED RELEASE ORAL ONCE
Status: COMPLETED | OUTPATIENT
Start: 2023-08-06 | End: 2023-08-06

## 2023-08-06 RX ORDER — POTASSIUM CHLORIDE 7.45 MG/ML
10 INJECTION INTRAVENOUS
Status: COMPLETED | OUTPATIENT
Start: 2023-08-06 | End: 2023-08-06

## 2023-08-06 RX ORDER — POTASSIUM CHLORIDE 7.45 MG/ML
10 INJECTION INTRAVENOUS
Status: DISPENSED | OUTPATIENT
Start: 2023-08-06 | End: 2023-08-06

## 2023-08-06 RX ORDER — BISACODYL 5 MG/1
TABLET, DELAYED RELEASE ORAL
Status: DISPENSED
Start: 2023-08-06 | End: 2023-08-07

## 2023-08-06 RX ORDER — 0.9 % SODIUM CHLORIDE 0.9 %
500 INTRAVENOUS SOLUTION INTRAVENOUS ONCE
Status: COMPLETED | OUTPATIENT
Start: 2023-08-06 | End: 2023-08-06

## 2023-08-06 RX ORDER — BISACODYL 5 MG/1
10 TABLET, DELAYED RELEASE ORAL ONCE
Status: COMPLETED | OUTPATIENT
Start: 2023-08-06 | End: 2023-08-06

## 2023-08-06 RX ORDER — BUSPIRONE HYDROCHLORIDE 5 MG/1
5 TABLET ORAL ONCE
Status: COMPLETED | OUTPATIENT
Start: 2023-08-06 | End: 2023-08-06

## 2023-08-06 RX ORDER — POTASSIUM CHLORIDE 20 MEQ/1
20 TABLET, EXTENDED RELEASE ORAL 2 TIMES DAILY
Status: DISCONTINUED | OUTPATIENT
Start: 2023-08-06 | End: 2023-08-06

## 2023-08-06 RX ADMIN — IPRATROPIUM BROMIDE AND ALBUTEROL SULFATE 1 DOSE: .5; 3 SOLUTION RESPIRATORY (INHALATION) at 15:24

## 2023-08-06 RX ADMIN — POTASSIUM CHLORIDE 10 MEQ: 7.46 INJECTION, SOLUTION INTRAVENOUS at 13:42

## 2023-08-06 RX ADMIN — PANTOPRAZOLE SODIUM 40 MG: 40 INJECTION, POWDER, FOR SOLUTION INTRAVENOUS at 16:32

## 2023-08-06 RX ADMIN — TORSEMIDE 20 MG: 20 TABLET ORAL at 12:15

## 2023-08-06 RX ADMIN — BISACODYL 10 MG: 5 TABLET, COATED ORAL at 12:15

## 2023-08-06 RX ADMIN — Medication 400 MG: at 09:44

## 2023-08-06 RX ADMIN — SODIUM CHLORIDE, PRESERVATIVE FREE 10 ML: 5 INJECTION INTRAVENOUS at 20:34

## 2023-08-06 RX ADMIN — Medication 2 PUFF: at 20:14

## 2023-08-06 RX ADMIN — POTASSIUM CHLORIDE 40 MEQ: 1500 TABLET, EXTENDED RELEASE ORAL at 17:14

## 2023-08-06 RX ADMIN — ACETAMINOPHEN 650 MG: 325 TABLET ORAL at 13:50

## 2023-08-06 RX ADMIN — Medication 5 MG: at 20:34

## 2023-08-06 RX ADMIN — POTASSIUM CHLORIDE 10 MEQ: 7.46 INJECTION, SOLUTION INTRAVENOUS at 12:40

## 2023-08-06 RX ADMIN — PANTOPRAZOLE SODIUM 40 MG: 40 INJECTION, POWDER, FOR SOLUTION INTRAVENOUS at 05:09

## 2023-08-06 RX ADMIN — BUSPIRONE HYDROCHLORIDE 5 MG: 5 TABLET ORAL at 18:18

## 2023-08-06 RX ADMIN — GUAIFENESIN 600 MG: 600 TABLET ORAL at 09:43

## 2023-08-06 RX ADMIN — SERTRALINE 50 MG: 50 TABLET, FILM COATED ORAL at 09:44

## 2023-08-06 RX ADMIN — SODIUM CHLORIDE, PRESERVATIVE FREE 10 ML: 5 INJECTION INTRAVENOUS at 10:08

## 2023-08-06 RX ADMIN — POLYETHYLENE GLYCOL 3350 17 G: 17 POWDER, FOR SOLUTION ORAL at 09:44

## 2023-08-06 RX ADMIN — POTASSIUM CHLORIDE 10 MEQ: 7.46 INJECTION, SOLUTION INTRAVENOUS at 15:12

## 2023-08-06 RX ADMIN — BUSPIRONE HYDROCHLORIDE 5 MG: 5 TABLET ORAL at 20:32

## 2023-08-06 RX ADMIN — BUSPIRONE HYDROCHLORIDE 5 MG: 5 TABLET ORAL at 09:44

## 2023-08-06 RX ADMIN — METOPROLOL TARTRATE 25 MG: 25 TABLET, FILM COATED ORAL at 20:34

## 2023-08-06 RX ADMIN — BISACODYL 10 MG: 5 TABLET, COATED ORAL at 09:44

## 2023-08-06 RX ADMIN — BUSPIRONE HYDROCHLORIDE 5 MG: 5 TABLET ORAL at 13:50

## 2023-08-06 RX ADMIN — ROFLUMILAST 500 MCG: 500 TABLET ORAL at 09:45

## 2023-08-06 RX ADMIN — Medication 2 PUFF: at 08:47

## 2023-08-06 RX ADMIN — SODIUM CHLORIDE 500 ML: 9 INJECTION, SOLUTION INTRAVENOUS at 17:13

## 2023-08-06 RX ADMIN — ATORVASTATIN CALCIUM 40 MG: 40 TABLET, FILM COATED ORAL at 20:32

## 2023-08-06 RX ADMIN — POLYETHYLENE GLYCOL 3350 17 G: 17 POWDER, FOR SOLUTION ORAL at 20:32

## 2023-08-06 RX ADMIN — ALBUTEROL SULFATE 2.5 MG: 2.5 SOLUTION RESPIRATORY (INHALATION) at 08:47

## 2023-08-06 RX ADMIN — ALBUTEROL SULFATE 2.5 MG: 2.5 SOLUTION RESPIRATORY (INHALATION) at 20:09

## 2023-08-06 RX ADMIN — SODIUM CHLORIDE 500 ML: 9 INJECTION, SOLUTION INTRAVENOUS at 09:42

## 2023-08-06 RX ADMIN — POTASSIUM CHLORIDE 40 MEQ: 1500 TABLET, EXTENDED RELEASE ORAL at 08:01

## 2023-08-06 ASSESSMENT — ENCOUNTER SYMPTOMS
EYES NEGATIVE: 1
BLOOD IN STOOL: 1
RESPIRATORY NEGATIVE: 1
ALLERGIC/IMMUNOLOGIC NEGATIVE: 1

## 2023-08-06 NOTE — CONSULTS
IR MIDLINE CATH 9/20/2021 [facility] SPECIAL PROCEDURES    MASTECTOMY, PARTIAL Left     SIGMOIDOSCOPY  02/27/2020    4 bands    SIGMOIDOSCOPY N/A 2/27/2020    FLEX SIG W/ BANDING SIGMOIDOSCOPY DIAGNOSTIC FLEXIBLE performed by Maynor Palomo DO at [address] [place] 1/9/2023    EGD DIAGNOSTIC ONLY performed by René Sewell MD at [address] [facility] N/A 1/13/2023    [facility]GD [facility]  1/13/2023    EGD ESOPHAGOGASTRODUODENOSCOPY DILATATION performed by René Sewell MD at [address] [place] [address] Rd:   Social History     Tobacco Use    Smoking status: Former     Packs/day: 1.00     Years: 50.00     Pack years: 50.00     Types: Cigarettes     Quit date: 9/19/2019     Years since quitting: 3.8    Smokeless tobacco: Never    Tobacco comments:     quit 2018   Substance Use Topics    Alcohol use: No     Family:   Family History   Problem Relation Age of Onset    Cancer Mother     Heart Disease Father     Stroke Father     Heart Disease Sister     Stroke Sister        Scheduled Medications:    bisacodyl  10 mg Oral Daily    polyethylene glycol  17 g Oral BID    atorvastatin  40 mg Oral Nightly    busPIRone  5 mg Oral TID    [Held by provider] ferrous sulfate  325 mg Oral Daily with breakfast    mometasone-formoterol  2 puff Inhalation BID    guaiFENesin  600 mg Oral Daily    magnesium oxide  400 mg Oral Daily    metoprolol tartrate  25 mg Oral BID    pantoprazole  40 mg IntraVENous BID AC    Roflumilast  500 mcg Oral Daily    sertraline  50 mg Oral Daily    torsemide  20 mg Oral Daily    sodium chloride flush  5-40 mL IntraVENous 2 times per day    albuterol  2.5 mg Nebulization TID    hydrOXYzine pamoate  25 mg Oral Once     Infusions:    sodium chloride       PRN Medications: melatonin, ipratropium 0.5 mg-albuterol 2.5 mg, calcium carbonate, fluticasone, sodium Hematochezia 8/3/2023 Yes   Assessment:   80 yo female with COPD, HTN, hyperlipidemia, CAD s/p CABG, CHF, CKD3, afib with hematochezia and drop in H/H to 7.7/23. Colonoscopy in 2021 had demonstrated hemorrhoids and rectal polyp. Having tachycardia and abnl EKG.     Plan:   The colonoscopy previously canceled due to patient refusing to take the prep will now be scheduled on Monday because the patient agrees to have a slow prep until then  Cardiac management and clearance per primary team  Will follow      Georgi Hooks MD  7:33 AM 8/6/2023            [address]   [address] [place] [address]    [address]      [facility]     Phone: [phone]     Fax: [phone]

## 2023-08-07 ENCOUNTER — ANESTHESIA EVENT (OUTPATIENT)
Dept: ENDOSCOPY | Age: 83
DRG: 378 | End: 2023-08-07
Payer: MEDICARE

## 2023-08-07 ENCOUNTER — ANESTHESIA (OUTPATIENT)
Dept: ENDOSCOPY | Age: 83
DRG: 378 | End: 2023-08-07
Payer: MEDICARE

## 2023-08-07 VITALS
HEART RATE: 100 BPM | WEIGHT: 116 LBS | TEMPERATURE: 98.4 F | OXYGEN SATURATION: 98 % | BODY MASS INDEX: 19.81 KG/M2 | DIASTOLIC BLOOD PRESSURE: 55 MMHG | HEIGHT: 64 IN | SYSTOLIC BLOOD PRESSURE: 102 MMHG | RESPIRATION RATE: 20 BRPM

## 2023-08-07 PROBLEM — Z01.810 PRE-OPERATIVE CARDIOVASCULAR EXAMINATION: Status: RESOLVED | Noted: 2023-07-07 | Resolved: 2023-08-07

## 2023-08-07 LAB
ANION GAP SERPL CALCULATED.3IONS-SCNC: 11 MMOL/L (ref 3–16)
BASOPHILS # BLD: 0 K/UL (ref 0–0.2)
BASOPHILS NFR BLD: 0.5 %
BUN SERPL-MCNC: 7 MG/DL (ref 7–20)
CALCIUM SERPL-MCNC: 8.9 MG/DL (ref 8.3–10.6)
CHLORIDE SERPL-SCNC: 99 MMOL/L (ref 99–110)
CO2 SERPL-SCNC: 24 MMOL/L (ref 21–32)
CREAT SERPL-MCNC: 1 MG/DL (ref 0.6–1.2)
DEPRECATED RDW RBC AUTO: 14.3 % (ref 12.4–15.4)
EOSINOPHIL # BLD: 0.3 K/UL (ref 0–0.6)
EOSINOPHIL NFR BLD: 3.6 %
GFR SERPLBLD CREATININE-BSD FMLA CKD-EPI: 56 ML/MIN/{1.73_M2}
GLUCOSE SERPL-MCNC: 101 MG/DL (ref 70–99)
HCT VFR BLD AUTO: 22.9 % (ref 36–48)
HGB BLD-MCNC: 7.6 G/DL (ref 12–16)
LYMPHOCYTES # BLD: 1.2 K/UL (ref 1–5.1)
LYMPHOCYTES NFR BLD: 15.2 %
MCH RBC QN AUTO: 30.1 PG (ref 26–34)
MCHC RBC AUTO-ENTMCNC: 33.1 G/DL (ref 31–36)
MCV RBC AUTO: 90.8 FL (ref 80–100)
MONOCYTES # BLD: 0.4 K/UL (ref 0–1.3)
MONOCYTES NFR BLD: 4.5 %
NEUTROPHILS # BLD: 6.2 K/UL (ref 1.7–7.7)
NEUTROPHILS NFR BLD: 76.2 %
PLATELET # BLD AUTO: 347 K/UL (ref 135–450)
PMV BLD AUTO: 6.8 FL (ref 5–10.5)
POTASSIUM SERPL-SCNC: 3.7 MMOL/L (ref 3.5–5.1)
RBC # BLD AUTO: 2.53 M/UL (ref 4–5.2)
SODIUM SERPL-SCNC: 134 MMOL/L (ref 136–145)
WBC # BLD AUTO: 8.2 K/UL (ref 4–11)

## 2023-08-07 PROCEDURE — 80048 BASIC METABOLIC PNL TOTAL CA: CPT

## 2023-08-07 PROCEDURE — 6360000002 HC RX W HCPCS: Performed by: INTERNAL MEDICINE

## 2023-08-07 PROCEDURE — 6370000000 HC RX 637 (ALT 250 FOR IP): Performed by: INTERNAL MEDICINE

## 2023-08-07 PROCEDURE — 3609010600 HC COLONOSCOPY POLYPECTOMY SNARE/COLD BIOPSY: Performed by: INTERNAL MEDICINE

## 2023-08-07 PROCEDURE — 3700000000 HC ANESTHESIA ATTENDED CARE: Performed by: INTERNAL MEDICINE

## 2023-08-07 PROCEDURE — 6360000002 HC RX W HCPCS: Performed by: NURSE ANESTHETIST, CERTIFIED REGISTERED

## 2023-08-07 PROCEDURE — 2580000003 HC RX 258: Performed by: INTERNAL MEDICINE

## 2023-08-07 PROCEDURE — 2580000003 HC RX 258: Performed by: NURSE ANESTHETIST, CERTIFIED REGISTERED

## 2023-08-07 PROCEDURE — 85025 COMPLETE CBC W/AUTO DIFF WBC: CPT

## 2023-08-07 PROCEDURE — 0DJD8ZZ INSPECTION OF LOWER INTESTINAL TRACT, VIA NATURAL OR ARTIFICIAL OPENING ENDOSCOPIC: ICD-10-PCS | Performed by: INTERNAL MEDICINE

## 2023-08-07 PROCEDURE — 88305 TISSUE EXAM BY PATHOLOGIST: CPT

## 2023-08-07 PROCEDURE — 2500000003 HC RX 250 WO HCPCS: Performed by: NURSE ANESTHETIST, CERTIFIED REGISTERED

## 2023-08-07 PROCEDURE — 7100000011 HC PHASE II RECOVERY - ADDTL 15 MIN: Performed by: INTERNAL MEDICINE

## 2023-08-07 PROCEDURE — 3700000001 HC ADD 15 MINUTES (ANESTHESIA): Performed by: INTERNAL MEDICINE

## 2023-08-07 PROCEDURE — 94640 AIRWAY INHALATION TREATMENT: CPT

## 2023-08-07 PROCEDURE — 2709999900 HC NON-CHARGEABLE SUPPLY: Performed by: INTERNAL MEDICINE

## 2023-08-07 PROCEDURE — C9113 INJ PANTOPRAZOLE SODIUM, VIA: HCPCS | Performed by: INTERNAL MEDICINE

## 2023-08-07 PROCEDURE — 7100000010 HC PHASE II RECOVERY - FIRST 15 MIN: Performed by: INTERNAL MEDICINE

## 2023-08-07 PROCEDURE — 36415 COLL VENOUS BLD VENIPUNCTURE: CPT

## 2023-08-07 RX ORDER — LIDOCAINE HYDROCHLORIDE 20 MG/ML
INJECTION, SOLUTION INFILTRATION; PERINEURAL PRN
Status: DISCONTINUED | OUTPATIENT
Start: 2023-08-07 | End: 2023-08-07 | Stop reason: SDUPTHER

## 2023-08-07 RX ORDER — PROPOFOL 10 MG/ML
INJECTION, EMULSION INTRAVENOUS PRN
Status: DISCONTINUED | OUTPATIENT
Start: 2023-08-07 | End: 2023-08-07 | Stop reason: SDUPTHER

## 2023-08-07 RX ORDER — POTASSIUM CHLORIDE 20 MEQ/1
20 TABLET, EXTENDED RELEASE ORAL DAILY
Qty: 90 TABLET | Refills: 1 | Status: ON HOLD | OUTPATIENT
Start: 2023-08-07

## 2023-08-07 RX ORDER — SODIUM CHLORIDE, SODIUM LACTATE, POTASSIUM CHLORIDE, CALCIUM CHLORIDE 600; 310; 30; 20 MG/100ML; MG/100ML; MG/100ML; MG/100ML
INJECTION, SOLUTION INTRAVENOUS CONTINUOUS PRN
Status: DISCONTINUED | OUTPATIENT
Start: 2023-08-07 | End: 2023-08-07 | Stop reason: SDUPTHER

## 2023-08-07 RX ADMIN — SODIUM CHLORIDE, PRESERVATIVE FREE 10 ML: 5 INJECTION INTRAVENOUS at 09:00

## 2023-08-07 RX ADMIN — PANTOPRAZOLE SODIUM 40 MG: 40 INJECTION, POWDER, FOR SOLUTION INTRAVENOUS at 05:14

## 2023-08-07 RX ADMIN — BUSPIRONE HYDROCHLORIDE 5 MG: 5 TABLET ORAL at 15:12

## 2023-08-07 RX ADMIN — PROPOFOL 100 MG: 10 INJECTION, EMULSION INTRAVENOUS at 12:03

## 2023-08-07 RX ADMIN — LIDOCAINE HYDROCHLORIDE 80 MG: 20 INJECTION, SOLUTION INFILTRATION; PERINEURAL at 12:03

## 2023-08-07 RX ADMIN — BUSPIRONE HYDROCHLORIDE 5 MG: 5 TABLET ORAL at 09:31

## 2023-08-07 RX ADMIN — Medication 2 PUFF: at 08:43

## 2023-08-07 RX ADMIN — ALBUTEROL SULFATE 2.5 MG: 2.5 SOLUTION RESPIRATORY (INHALATION) at 08:43

## 2023-08-07 RX ADMIN — IPRATROPIUM BROMIDE AND ALBUTEROL SULFATE 1 DOSE: .5; 3 SOLUTION RESPIRATORY (INHALATION) at 10:58

## 2023-08-07 RX ADMIN — SODIUM CHLORIDE, SODIUM LACTATE, POTASSIUM CHLORIDE, AND CALCIUM CHLORIDE: .6; .31; .03; .02 INJECTION, SOLUTION INTRAVENOUS at 12:00

## 2023-08-07 ASSESSMENT — ENCOUNTER SYMPTOMS: SHORTNESS OF BREATH: 1

## 2023-08-07 ASSESSMENT — PAIN SCALES - GENERAL
PAINLEVEL_OUTOF10: 0

## 2023-08-07 ASSESSMENT — COPD QUESTIONNAIRES: CAT_SEVERITY: MODERATE

## 2023-08-07 NOTE — OP NOTE
Colonoscopy Note    Patient:   Alis Durbin    YOB: 1940    Facility:   [facility] [Inpatient]  Referring/PCP: Jeannie Ya MD  Procedure:   Colonoscopy --diagnostic  Date:     8/7/2023  Endoscopist:  Rodney Veronica MD, MD    Preoperative Diagnosis:  hematochezia. Postoperative Diagnosis: Colon polyps and hemorrhoids (no bleeding)    Anesthesia: MAC    Estimated blood loss: < 5 ml    Complications:  None    Description of Procedure:  Informed consent was obtained from the patient after explanation of the procedure including indications, description of the procedure,  benefits and possible risks and complications of the procedure, and alternatives. Questions were answered. The patient's history was reviewed and a directed physical examination was performed prior to the procedure. Patient was monitored throughout the procedure with pulse oximetry and periodic assessment of vital signs. Patient was sedated as noted above. The Nursing staff and I performed a time out. With the patient initially in the left lateral decubitus position, a digital rectal examination was performed and revealed negative without mass, lesions or tenderness. The Olympus video colonoscope was placed in the patient's rectum and advanced without difficulty  to the cecum, which was identified by the ileocecal valve and appendiceal orifice. Photographs were taken. The prep was good. Examination of the mucosa was performed during both introduction and withdrawal of the colonoscope. Retroflexed view of the rectum was performed. Withdrawal time was 7 minutes. Findings:     1. Two 5 mm polyps in ascending/transv colon removed w cold-snare  2. Mall hemorrhoids     Recommendations:  Await pathology.   Avoid constipation and OK to discharge from GI standpoint    Rodney Veronica MD       [phone]) [phone]        Rodney Veronica MD, MD

## 2023-08-07 NOTE — ANESTHESIA POSTPROCEDURE EVALUATION
Department of Anesthesiology  Postprocedure Note    Patient: Dolores Otoole  MRN: [id number]  YOB: 1940  Date of evaluation: 8/7/2023      Procedure Summary     Date: 08/07/23 Room / Location: [facility] [address]    Anesthesia Start: 8270 Anesthesia Stop: 1224    Procedure: COLONOSCOPY POLYPECTOMY SNARE Diagnosis:       Hematochezia      (Hematochezia [K92.1])    Surgeons: Brandi Kramer MD Responsible Provider: Mejia Riley MD    Anesthesia Type: MAC ASA Status: 3          Anesthesia Type: No value filed.     David Phase I: David Score: 10    David Phase II: David Score: 8      Anesthesia Post Evaluation    Patient location during evaluation: PACU  Level of consciousness: awake  Airway patency: patent  Nausea & Vomiting: no nausea  Complications: no  Cardiovascular status: blood pressure returned to baseline  Respiratory status: acceptable  Hydration status: euvolemic  Pain management: adequate

## 2023-08-07 NOTE — CARE COORDINATION
CASE MANAGEMENT DISCHARGE SUMMARY      Discharge to: Home with [facility] and zoila    IMM given: 8/7/2023    Transportation:    Family/car: private     Confirmed discharge plan with:     Patient: yes     Family:  yes per pt        RN, name: Han Saldaña RN

## 2023-08-07 NOTE — PROGRESS NOTES
Pt admitted to [facility]. Pt a/o but drowsy. SBP in the 80's - anesthesia aware. Denies c/o pain. Assessment complete as charted. . Denies other needs. Will continue to monitor.

## 2023-08-07 NOTE — PERIOP NOTE
Pts chart reviewed by this Rn, in preparation for scheduled endoscopy procedure    Inpatient report received from [facility] MELISSA

## 2023-08-07 NOTE — PLAN OF CARE
Problem: Discharge Planning  Goal: Discharge to home or other facility with appropriate resources  8/4/2023 0212 by Roberto Ernst RN  Outcome: Progressing     Problem: Pain  Goal: Verbalizes/displays adequate comfort level or baseline comfort level  8/4/2023 0212 by Roberto Ernst RN  Outcome: Progressing  Flowsheets (Taken 8/3/2023 1616 by Jeoffrey Gosselin, RN)  Verbalizes/displays adequate comfort level or baseline comfort level: Encourage patient to monitor pain and request assistance     Problem: Safety - Adult  Goal: Free from fall injury  8/4/2023 0212 by Roberto Ernst RN  Outcome: Progressing     Problem: ABCDS Injury Assessment  Goal: Absence of physical injury  8/4/2023 0212 by Roberto Ernst RN  Outcome: Progressing     Problem: Respiratory - Adult  Goal: Achieves optimal ventilation and oxygenation  8/4/2023 0212 by Roberto Ernst RN  Outcome: Progressing     Problem: Gastrointestinal - Adult  Goal: Minimal or absence of nausea and vomiting  1/6/6486 2760 by Marquez Starr RN  Outcome: Progressing  8/4/2023 0212 by Roberto Ernst RN  Outcome: Progressing  Goal: Maintains or returns to baseline bowel function  2/8/9505 1924 by Marquez Starr RN  Outcome: Progressing  8/4/2023 0212 by Roberto Ernst RN  Outcome: Progressing     Problem: Metabolic/Fluid and Electrolytes - Adult  Goal: Electrolytes maintained within normal limits  8/4/2023 0212 by Roberto Ernst RN  Outcome: Progressing     Problem: Hematologic - Adult  Goal: Maintains hematologic stability  8/4/2023 0212 by Roberto Ernst RN  Outcome: Progressing
Problem: Discharge Planning  Goal: Discharge to home or other facility with appropriate resources  8/7/2023 1613 by Meir Garay RN  Outcome: Progressing  8/7/2023 0227 by Beau Purvis RN  Outcome: Progressing  Flowsheets (Taken 8/6/2023 1938)  Discharge to home or other facility with appropriate resources:   Arrange for needed discharge resources and transportation as appropriate   Refer to discharge planning if patient needs post-hospital services based on physician order or complex needs related to functional status, cognitive ability or social support system     Problem: Pain  Goal: Verbalizes/displays adequate comfort level or baseline comfort level  8/7/2023 1613 by Meir Garay RN  Outcome: Progressing  8/7/2023 0227 by Beau Purvis RN  Outcome: Progressing     Problem: Safety - Adult  Goal: Free from fall injury  8/7/2023 1613 by Meir Garay RN  Outcome: Progressing  8/7/2023 0227 by Beau Purvis RN  Outcome: Progressing     Problem: ABCDS Injury Assessment  Goal: Absence of physical injury  8/7/2023 1613 by Meir Garay RN  Outcome: Progressing  8/7/2023 0227 by Beau Purvis RN  Outcome: Progressing     Problem: Respiratory - Adult  Goal: Achieves optimal ventilation and oxygenation  Outcome: Progressing     Problem: Gastrointestinal - Adult  Goal: Minimal or absence of nausea and vomiting  Outcome: Progressing  Goal: Maintains or returns to baseline bowel function  8/7/2023 1613 by Meir Garay RN  Outcome: Progressing  8/7/2023 0227 by Beau Purvis RN  Outcome: Progressing     Problem: Metabolic/Fluid and Electrolytes - Adult  Goal: Electrolytes maintained within normal limits  Outcome: Progressing     Problem: Hematologic - Adult  Goal: Maintains hematologic stability  8/7/2023 1613 by Meir Garay RN  Outcome: Progressing  8/7/2023 0227 by Beau Purvis RN  Outcome: Progressing  Flowsheets (Taken 8/6/2023 1938)  Maintains hematologic stability:   Assess for signs and symptoms of bleeding or
Problem: Discharge Planning  Goal: Discharge to home or other facility with appropriate resources  Outcome: Progressing     Problem: Pain  Goal: Verbalizes/displays adequate comfort level or baseline comfort level  Outcome: Progressing     Problem: Safety - Adult  Goal: Free from fall injury  Outcome: Progressing     Problem: ABCDS Injury Assessment  Goal: Absence of physical injury  Outcome: Progressing     Problem: Respiratory - Adult  Goal: Achieves optimal ventilation and oxygenation  Outcome: Progressing     Problem: Gastrointestinal - Adult  Goal: Minimal or absence of nausea and vomiting  Outcome: Progressing  Goal: Maintains or returns to baseline bowel function  Outcome: Progressing     Problem: Metabolic/Fluid and Electrolytes - Adult  Goal: Electrolytes maintained within normal limits  Outcome: Progressing     Problem: Hematologic - Adult  Goal: Maintains hematologic stability  Outcome: Progressing
Problem: Discharge Planning  Goal: Discharge to home or other facility with appropriate resources  Outcome: Progressing  Flowsheets (Taken 8/5/2023 2105)  Discharge to home or other facility with appropriate resources:   Identify barriers to discharge with patient and caregiver   Refer to discharge planning if patient needs post-hospital services based on physician order or complex needs related to functional status, cognitive ability or social support system     Problem: Safety - Adult  Goal: Free from fall injury  Outcome: Progressing     Problem: ABCDS Injury Assessment  Goal: Absence of physical injury  Outcome: Progressing     Problem: Respiratory - Adult  Goal: Achieves optimal ventilation and oxygenation  Outcome: Progressing     Problem: Hematologic - Adult  Goal: Maintains hematologic stability  Outcome: Progressing  Flowsheets (Taken 8/5/2023 2105)  Maintains hematologic stability:   Assess for signs and symptoms of bleeding or hemorrhage   Monitor labs for bleeding or clotting disorders     Problem: Metabolic/Fluid and Electrolytes - Adult  Goal: Electrolytes maintained within normal limits  Outcome: Progressing  Flowsheets (Taken 8/5/2023 2105)  Electrolytes maintained within normal limits:   Monitor labs and assess patient for signs and symptoms of electrolyte imbalances   Administer electrolyte replacement as ordered   Monitor response to electrolyte replacements, including repeat lab results as appropriate
Problem: Pain  Goal: Verbalizes/displays adequate comfort level or baseline comfort level  Outcome: Progressing     Problem: Safety - Adult  Goal: Free from fall injury  Outcome: Progressing     Problem: ABCDS Injury Assessment  Goal: Absence of physical injury  Outcome: Progressing     Problem: Gastrointestinal - Adult  Goal: Maintains or returns to baseline bowel function  Outcome: Progressing     Problem: Anxiety  Goal: Will report anxiety at manageable levels  Description: INTERVENTIONS:  1. Administer medication as ordered  2. Teach and rehearse alternative coping skills  3.  Provide emotional support with 1:1 interaction with staff  Recent Flowsheet Documentation  Taken 8/6/2023 1938 by Sal Sterling RN  Will report anxiety at manageable levels:   Teach and rehearse alternative coping skills   Provide emotional support with 1:1 interaction with staff     Problem: Chronic Conditions and Co-morbidities  Goal: Patient's chronic conditions and co-morbidity symptoms are monitored and maintained or improved  Outcome: Progressing  Flowsheets (Taken 8/6/2023 1938)  Care Plan - Patient's Chronic Conditions and Co-Morbidity Symptoms are Monitored and Maintained or Improved:   Monitor and assess patient's chronic conditions and comorbid symptoms for stability, deterioration, or improvement   Update acute care plan with appropriate goals if chronic or comorbid symptoms are exacerbated and prevent overall improvement and discharge     Problem: Hematologic - Adult  Goal: Maintains hematologic stability  Outcome: Progressing  Flowsheets (Taken 8/6/2023 1938)  Maintains hematologic stability:   Assess for signs and symptoms of bleeding or hemorrhage   Monitor labs for bleeding or clotting disorders     Problem: Metabolic/Fluid and Electrolytes - Adult  Goal: Electrolytes maintained within normal limits  Recent Flowsheet Documentation  Taken 8/6/2023 1938 by Sal Sterling RN  Electrolytes maintained within normal limits:   Monitor
vascular access sites hourly  3. Every 4-6 hours minimum:  Change oxygen saturation probe site  4. Every 4-6 hours:  If on nasal continuous positive airway pressure, respiratory therapy assess nares and determine need for appliance change or resting period. 8/7/2023 1614 by Danyel Freeman RN  Outcome: Adequate for Discharge  8/7/2023 1613 by Danyel Freeman RN  Outcome: Progressing     Problem: Anxiety  Goal: Will report anxiety at manageable levels  Description: INTERVENTIONS:  1. Administer medication as ordered  2. Teach and rehearse alternative coping skills  3.  Provide emotional support with 1:1 interaction with staff  8/7/2023 1614 by Danyel Freeman RN  Outcome: Adequate for Discharge  8/7/2023 1613 by Danyel Freeman RN  Outcome: Progressing

## 2023-08-07 NOTE — CARE COORDINATION
[facility]    DC order noted, all docs needed have been faxed to [facility] for home care services.     Home care to see patient within 24-48 hrs    Edwige Spear RN, BSN CTN  [facility] [phone]

## 2023-08-08 LAB
EKG ATRIAL RATE: 120 BPM
EKG DIAGNOSIS: NORMAL
EKG P AXIS: 84 DEGREES
EKG P-R INTERVAL: 158 MS
EKG Q-T INTERVAL: 336 MS
EKG QRS DURATION: 90 MS
EKG QTC CALCULATION (BAZETT): 474 MS
EKG R AXIS: -47 DEGREES
EKG T AXIS: 96 DEGREES
EKG VENTRICULAR RATE: 120 BPM

## 2023-08-08 PROCEDURE — 93010 ELECTROCARDIOGRAM REPORT: CPT | Performed by: INTERNAL MEDICINE

## 2023-08-10 NOTE — PROGRESS NOTES
Physician Progress Note      PATIENT:               Brianda Brown  CSN #:                  [id number]  :                       1940  ADMIT DATE:       8/3/2023 5:25 AM  DISCH DATE:        2023 5:03 PM  RESPONDING  PROVIDER #:        Cintia Bowen MD          QUERY TEXT:    Patient admitted with GI bleeding, noted to have  colon polyps or hemorrhoids   and on Xarelto. If possible, please document in progress notes and discharge   summary the cause of the GI bleeding: The medical record reflects the following:  Risk Factors: hx of hemorrhoids, on Xarelto  Clinical Indicators: presented with hematochezia, per H&P, \"GIB, at least in   part due to her rivaroxaban\" - colonoscopy sowed hemorrhoids and colon polyps  Treatment: GI consult, PPI, Kcentra, Colonoscopy, monitor labs  Options provided:  -- GIB multifactorial due to colon polyps, hemorrhoids and Xarelto  -- GI bleeding due to colon polyps  -- GI bleeding due to hemorrhoids  -- GI bleeding due to infectious Xarelto  -- GIB due to other cause, please specify. -- Other - I will add my own diagnosis  -- Disagree - Not applicable / Not valid  -- Disagree - Clinically unable to determine / Unknown  -- Refer to Clinical Documentation Reviewer    PROVIDER RESPONSE TEXT:    GIB due to unknown source    Query created by: Dorian Garduno on 8/10/2023 8:16 AM      Electronically signed by:   Cintia Bowen MD 8/10/2023 3:47 PM

## 2023-08-19 ENCOUNTER — APPOINTMENT (OUTPATIENT)
Dept: CT IMAGING | Age: 83
DRG: 644 | End: 2023-08-19
Payer: MEDICARE

## 2023-08-19 ENCOUNTER — HOSPITAL ENCOUNTER (INPATIENT)
Age: 83
LOS: 3 days | Discharge: NURSING, REHAB OR LONG-TERM CARE | DRG: 644 | End: 2023-08-23
Attending: EMERGENCY MEDICINE | Admitting: FAMILY MEDICINE
Payer: MEDICARE

## 2023-08-19 ENCOUNTER — APPOINTMENT (OUTPATIENT)
Dept: GENERAL RADIOLOGY | Age: 83
DRG: 644 | End: 2023-08-19
Payer: MEDICARE

## 2023-08-19 DIAGNOSIS — J44.1 COPD EXACERBATION (HCC): ICD-10-CM

## 2023-08-19 DIAGNOSIS — R77.8 ELEVATED TROPONIN: ICD-10-CM

## 2023-08-19 DIAGNOSIS — R06.00 DYSPNEA, UNSPECIFIED TYPE: Primary | ICD-10-CM

## 2023-08-19 PROBLEM — J44.9 COPD (CHRONIC OBSTRUCTIVE PULMONARY DISEASE) (HCC): Status: ACTIVE | Noted: 2023-08-19

## 2023-08-19 LAB
ALBUMIN SERPL-MCNC: 3.2 G/DL (ref 3.4–5)
ALBUMIN/GLOB SERPL: 0.9 {RATIO} (ref 1.1–2.2)
ALP SERPL-CCNC: 83 U/L (ref 40–129)
ALT SERPL-CCNC: 7 U/L (ref 10–40)
ANION GAP SERPL CALCULATED.3IONS-SCNC: 14 MMOL/L (ref 3–16)
AST SERPL-CCNC: 20 U/L (ref 15–37)
BASE EXCESS BLDV CALC-SCNC: 2.6 MMOL/L (ref -3–3)
BASOPHILS # BLD: 0 K/UL (ref 0–0.2)
BASOPHILS NFR BLD: 0.5 %
BILIRUB SERPL-MCNC: <0.2 MG/DL (ref 0–1)
BUN SERPL-MCNC: 11 MG/DL (ref 7–20)
CALCIUM SERPL-MCNC: 8.9 MG/DL (ref 8.3–10.6)
CHLORIDE SERPL-SCNC: 96 MMOL/L (ref 99–110)
CO2 BLDV-SCNC: 28 MMOL/L
CO2 SERPL-SCNC: 23 MMOL/L (ref 21–32)
COHGB MFR BLDV: 4.3 % (ref 0–1.5)
CREAT SERPL-MCNC: 1.3 MG/DL (ref 0.6–1.2)
DEPRECATED RDW RBC AUTO: 14.9 % (ref 12.4–15.4)
EOSINOPHIL # BLD: 0 K/UL (ref 0–0.6)
EOSINOPHIL NFR BLD: 0.5 %
FLUAV RNA RESP QL NAA+PROBE: NOT DETECTED
FLUBV RNA RESP QL NAA+PROBE: NOT DETECTED
GFR SERPLBLD CREATININE-BSD FMLA CKD-EPI: 41 ML/MIN/{1.73_M2}
GLUCOSE SERPL-MCNC: 87 MG/DL (ref 70–99)
HCO3 BLDV-SCNC: 27 MMOL/L (ref 23–29)
HCT VFR BLD AUTO: 29.3 % (ref 36–48)
HGB BLD-MCNC: 9.7 G/DL (ref 12–16)
LYMPHOCYTES # BLD: 0.8 K/UL (ref 1–5.1)
LYMPHOCYTES NFR BLD: 17.6 %
MCH RBC QN AUTO: 30.4 PG (ref 26–34)
MCHC RBC AUTO-ENTMCNC: 33 G/DL (ref 31–36)
MCV RBC AUTO: 92 FL (ref 80–100)
METHGB MFR BLDV: 0.5 %
MONOCYTES # BLD: 0.4 K/UL (ref 0–1.3)
MONOCYTES NFR BLD: 8.7 %
NEUTROPHILS # BLD: 3.3 K/UL (ref 1.7–7.7)
NEUTROPHILS NFR BLD: 72.7 %
NT-PROBNP SERPL-MCNC: 1024 PG/ML (ref 0–449)
O2 THERAPY: ABNORMAL
PCO2 BLDV: 41 MMHG (ref 40–50)
PH BLDV: 7.44 [PH] (ref 7.35–7.45)
PLATELET # BLD AUTO: 300 K/UL (ref 135–450)
PMV BLD AUTO: 7.4 FL (ref 5–10.5)
PO2 BLDV: 109.4 MMHG (ref 25–40)
POTASSIUM SERPL-SCNC: 5 MMOL/L (ref 3.5–5.1)
PROCALCITONIN SERPL IA-MCNC: 0.16 NG/ML (ref 0–0.15)
PROT SERPL-MCNC: 6.7 G/DL (ref 6.4–8.2)
RBC # BLD AUTO: 3.19 M/UL (ref 4–5.2)
SAO2 % BLDV: 98 %
SARS-COV-2 RNA RESP QL NAA+PROBE: NOT DETECTED
SODIUM SERPL-SCNC: 133 MMOL/L (ref 136–145)
TROPONIN, HIGH SENSITIVITY: 49 NG/L (ref 0–14)
TROPONIN, HIGH SENSITIVITY: 57 NG/L (ref 0–14)
TROPONIN, HIGH SENSITIVITY: 61 NG/L (ref 0–14)
WBC # BLD AUTO: 4.6 K/UL (ref 4–11)

## 2023-08-19 PROCEDURE — 71045 X-RAY EXAM CHEST 1 VIEW: CPT

## 2023-08-19 PROCEDURE — 93005 ELECTROCARDIOGRAM TRACING: CPT | Performed by: PHYSICIAN ASSISTANT

## 2023-08-19 PROCEDURE — 99223 1ST HOSP IP/OBS HIGH 75: CPT | Performed by: FAMILY MEDICINE

## 2023-08-19 PROCEDURE — 94761 N-INVAS EAR/PLS OXIMETRY MLT: CPT

## 2023-08-19 PROCEDURE — 6370000000 HC RX 637 (ALT 250 FOR IP): Performed by: FAMILY MEDICINE

## 2023-08-19 PROCEDURE — 85025 COMPLETE CBC W/AUTO DIFF WBC: CPT

## 2023-08-19 PROCEDURE — 2580000003 HC RX 258: Performed by: FAMILY MEDICINE

## 2023-08-19 PROCEDURE — 6360000002 HC RX W HCPCS: Performed by: FAMILY MEDICINE

## 2023-08-19 PROCEDURE — 99285 EMERGENCY DEPT VISIT HI MDM: CPT

## 2023-08-19 PROCEDURE — 96361 HYDRATE IV INFUSION ADD-ON: CPT

## 2023-08-19 PROCEDURE — 94640 AIRWAY INHALATION TREATMENT: CPT

## 2023-08-19 PROCEDURE — G0378 HOSPITAL OBSERVATION PER HR: HCPCS

## 2023-08-19 PROCEDURE — 96376 TX/PRO/DX INJ SAME DRUG ADON: CPT

## 2023-08-19 PROCEDURE — 83880 ASSAY OF NATRIURETIC PEPTIDE: CPT

## 2023-08-19 PROCEDURE — 84145 PROCALCITONIN (PCT): CPT

## 2023-08-19 PROCEDURE — 96374 THER/PROPH/DIAG INJ IV PUSH: CPT

## 2023-08-19 PROCEDURE — 2700000000 HC OXYGEN THERAPY PER DAY

## 2023-08-19 PROCEDURE — 87636 SARSCOV2 & INF A&B AMP PRB: CPT

## 2023-08-19 PROCEDURE — 96375 TX/PRO/DX INJ NEW DRUG ADDON: CPT

## 2023-08-19 PROCEDURE — 6360000002 HC RX W HCPCS: Performed by: PHYSICIAN ASSISTANT

## 2023-08-19 PROCEDURE — 87040 BLOOD CULTURE FOR BACTERIA: CPT

## 2023-08-19 PROCEDURE — 96367 TX/PROPH/DG ADDL SEQ IV INF: CPT

## 2023-08-19 PROCEDURE — 96365 THER/PROPH/DIAG IV INF INIT: CPT

## 2023-08-19 PROCEDURE — 80053 COMPREHEN METABOLIC PANEL: CPT

## 2023-08-19 PROCEDURE — 96372 THER/PROPH/DIAG INJ SC/IM: CPT

## 2023-08-19 PROCEDURE — 74176 CT ABD & PELVIS W/O CONTRAST: CPT

## 2023-08-19 PROCEDURE — 2500000003 HC RX 250 WO HCPCS: Performed by: FAMILY MEDICINE

## 2023-08-19 PROCEDURE — 36415 COLL VENOUS BLD VENIPUNCTURE: CPT

## 2023-08-19 PROCEDURE — 82803 BLOOD GASES ANY COMBINATION: CPT

## 2023-08-19 PROCEDURE — 6370000000 HC RX 637 (ALT 250 FOR IP): Performed by: PHYSICIAN ASSISTANT

## 2023-08-19 PROCEDURE — 84484 ASSAY OF TROPONIN QUANT: CPT

## 2023-08-19 RX ORDER — ONDANSETRON 4 MG/1
4 TABLET, ORALLY DISINTEGRATING ORAL EVERY 8 HOURS PRN
Status: DISCONTINUED | OUTPATIENT
Start: 2023-08-19 | End: 2023-08-23 | Stop reason: HOSPADM

## 2023-08-19 RX ORDER — IPRATROPIUM BROMIDE AND ALBUTEROL SULFATE 2.5; .5 MG/3ML; MG/3ML
1 SOLUTION RESPIRATORY (INHALATION)
Status: DISCONTINUED | OUTPATIENT
Start: 2023-08-19 | End: 2023-08-23 | Stop reason: HOSPADM

## 2023-08-19 RX ORDER — ASPIRIN 325 MG
325 TABLET ORAL ONCE
Status: COMPLETED | OUTPATIENT
Start: 2023-08-19 | End: 2023-08-19

## 2023-08-19 RX ORDER — BENZONATATE 100 MG/1
100 CAPSULE ORAL 3 TIMES DAILY PRN
Status: DISCONTINUED | OUTPATIENT
Start: 2023-08-19 | End: 2023-08-23 | Stop reason: HOSPADM

## 2023-08-19 RX ORDER — ACETAMINOPHEN 325 MG/1
650 TABLET ORAL EVERY 6 HOURS PRN
Status: DISCONTINUED | OUTPATIENT
Start: 2023-08-19 | End: 2023-08-20 | Stop reason: DRUGHIGH

## 2023-08-19 RX ORDER — MORPHINE SULFATE 4 MG/ML
4 INJECTION, SOLUTION INTRAMUSCULAR; INTRAVENOUS ONCE
Status: DISCONTINUED | OUTPATIENT
Start: 2023-08-19 | End: 2023-08-23 | Stop reason: HOSPADM

## 2023-08-19 RX ORDER — ASPIRIN 81 MG/1
81 TABLET, CHEWABLE ORAL DAILY
Status: DISCONTINUED | OUTPATIENT
Start: 2023-08-20 | End: 2023-08-23 | Stop reason: HOSPADM

## 2023-08-19 RX ORDER — POLYETHYLENE GLYCOL 3350 17 G/17G
17 POWDER, FOR SOLUTION ORAL DAILY PRN
Status: DISCONTINUED | OUTPATIENT
Start: 2023-08-19 | End: 2023-08-23 | Stop reason: HOSPADM

## 2023-08-19 RX ORDER — IPRATROPIUM BROMIDE AND ALBUTEROL SULFATE 2.5; .5 MG/3ML; MG/3ML
1 SOLUTION RESPIRATORY (INHALATION) ONCE
Status: COMPLETED | OUTPATIENT
Start: 2023-08-19 | End: 2023-08-19

## 2023-08-19 RX ORDER — ENOXAPARIN SODIUM 100 MG/ML
40 INJECTION SUBCUTANEOUS DAILY
Status: DISCONTINUED | OUTPATIENT
Start: 2023-08-19 | End: 2023-08-19 | Stop reason: DRUGHIGH

## 2023-08-19 RX ORDER — ACETAMINOPHEN 650 MG/1
650 SUPPOSITORY RECTAL EVERY 6 HOURS PRN
Status: DISCONTINUED | OUTPATIENT
Start: 2023-08-19 | End: 2023-08-20 | Stop reason: DRUGHIGH

## 2023-08-19 RX ORDER — DEXTROMETHORPHAN HYDROBROMIDE AND PROMETHAZINE HYDROCHLORIDE 15; 6.25 MG/5ML; MG/5ML
5 SYRUP ORAL EVERY 4 HOURS PRN
Status: DISCONTINUED | OUTPATIENT
Start: 2023-08-19 | End: 2023-08-23 | Stop reason: HOSPADM

## 2023-08-19 RX ORDER — SODIUM CHLORIDE 9 MG/ML
INJECTION, SOLUTION INTRAVENOUS CONTINUOUS
Status: DISCONTINUED | OUTPATIENT
Start: 2023-08-19 | End: 2023-08-20

## 2023-08-19 RX ORDER — SODIUM CHLORIDE 0.9 % (FLUSH) 0.9 %
5-40 SYRINGE (ML) INJECTION EVERY 12 HOURS SCHEDULED
Status: DISCONTINUED | OUTPATIENT
Start: 2023-08-19 | End: 2023-08-23 | Stop reason: HOSPADM

## 2023-08-19 RX ORDER — ONDANSETRON 2 MG/ML
4 INJECTION INTRAMUSCULAR; INTRAVENOUS EVERY 6 HOURS PRN
Status: DISCONTINUED | OUTPATIENT
Start: 2023-08-19 | End: 2023-08-19 | Stop reason: SDUPTHER

## 2023-08-19 RX ORDER — NITROGLYCERIN 0.4 MG/1
0.4 TABLET SUBLINGUAL EVERY 5 MIN PRN
Status: DISCONTINUED | OUTPATIENT
Start: 2023-08-19 | End: 2023-08-23 | Stop reason: HOSPADM

## 2023-08-19 RX ORDER — ATORVASTATIN CALCIUM 80 MG/1
80 TABLET, FILM COATED ORAL NIGHTLY
Status: DISCONTINUED | OUTPATIENT
Start: 2023-08-19 | End: 2023-08-20

## 2023-08-19 RX ORDER — ENOXAPARIN SODIUM 100 MG/ML
30 INJECTION SUBCUTANEOUS DAILY
Status: DISCONTINUED | OUTPATIENT
Start: 2023-08-19 | End: 2023-08-23

## 2023-08-19 RX ORDER — SODIUM CHLORIDE 9 MG/ML
INJECTION, SOLUTION INTRAVENOUS PRN
Status: DISCONTINUED | OUTPATIENT
Start: 2023-08-19 | End: 2023-08-23 | Stop reason: HOSPADM

## 2023-08-19 RX ORDER — PREDNISONE 20 MG/1
40 TABLET ORAL DAILY
Status: DISCONTINUED | OUTPATIENT
Start: 2023-08-22 | End: 2023-08-20

## 2023-08-19 RX ORDER — ONDANSETRON 4 MG/1
4 TABLET, ORALLY DISINTEGRATING ORAL EVERY 8 HOURS PRN
Status: DISCONTINUED | OUTPATIENT
Start: 2023-08-19 | End: 2023-08-19 | Stop reason: SDUPTHER

## 2023-08-19 RX ORDER — SODIUM CHLORIDE 0.9 % (FLUSH) 0.9 %
5-40 SYRINGE (ML) INJECTION PRN
Status: DISCONTINUED | OUTPATIENT
Start: 2023-08-19 | End: 2023-08-23 | Stop reason: HOSPADM

## 2023-08-19 RX ORDER — METHYLPREDNISOLONE SODIUM SUCCINATE 125 MG/2ML
60 INJECTION, POWDER, LYOPHILIZED, FOR SOLUTION INTRAMUSCULAR; INTRAVENOUS ONCE
Status: COMPLETED | OUTPATIENT
Start: 2023-08-19 | End: 2023-08-19

## 2023-08-19 RX ORDER — ONDANSETRON 2 MG/ML
4 INJECTION INTRAMUSCULAR; INTRAVENOUS EVERY 6 HOURS PRN
Status: DISCONTINUED | OUTPATIENT
Start: 2023-08-19 | End: 2023-08-23 | Stop reason: HOSPADM

## 2023-08-19 RX ADMIN — SODIUM CHLORIDE: 9 INJECTION, SOLUTION INTRAVENOUS at 18:27

## 2023-08-19 RX ADMIN — IPRATROPIUM BROMIDE AND ALBUTEROL SULFATE 1 DOSE: .5; 3 SOLUTION RESPIRATORY (INHALATION) at 19:55

## 2023-08-19 RX ADMIN — CEFTRIAXONE SODIUM 1000 MG: 1 INJECTION, POWDER, FOR SOLUTION INTRAMUSCULAR; INTRAVENOUS at 21:31

## 2023-08-19 RX ADMIN — ATORVASTATIN CALCIUM 80 MG: 80 TABLET, FILM COATED ORAL at 21:30

## 2023-08-19 RX ADMIN — BENZONATATE 100 MG: 100 CAPSULE ORAL at 18:26

## 2023-08-19 RX ADMIN — METHYLPREDNISOLONE SODIUM SUCCINATE 60 MG: 125 INJECTION INTRAMUSCULAR; INTRAVENOUS at 15:17

## 2023-08-19 RX ADMIN — ASPIRIN 325 MG: 325 TABLET ORAL at 15:19

## 2023-08-19 RX ADMIN — IPRATROPIUM BROMIDE AND ALBUTEROL SULFATE 1 DOSE: 2.5; .5 SOLUTION RESPIRATORY (INHALATION) at 15:20

## 2023-08-19 RX ADMIN — ENOXAPARIN SODIUM 30 MG: 100 INJECTION SUBCUTANEOUS at 18:26

## 2023-08-19 RX ADMIN — METHYLPREDNISOLONE SODIUM SUCCINATE 40 MG: 40 INJECTION, POWDER, FOR SOLUTION INTRAMUSCULAR; INTRAVENOUS at 21:30

## 2023-08-19 RX ADMIN — Medication 10 ML: at 18:26

## 2023-08-19 RX ADMIN — DOXYCYCLINE 100 MG: 100 INJECTION, POWDER, LYOPHILIZED, FOR SOLUTION INTRAVENOUS at 18:33

## 2023-08-19 ASSESSMENT — PAIN SCALES - GENERAL: PAINLEVEL_OUTOF10: 0

## 2023-08-19 ASSESSMENT — PAIN - FUNCTIONAL ASSESSMENT: PAIN_FUNCTIONAL_ASSESSMENT: 0-10

## 2023-08-19 NOTE — ED PROVIDER NOTES
I independently performed a history and physical on [employer]. All diagnostic, treatment, and disposition decisions were made by myself in conjunction with the advanced practice provider. For further details of [facility] emergency department encounter, please see VASHTI Noyola's documentation. Chief Complaint   Patient presents with    Shortness of Breath     Patient comes from home for increased shortness of breath and cough since Thursday. Patient states she has been doing breathing treatments at home since Thursday with little relief. Patient states she wears 2.5L O2 at home. EMS reports patient's oxygen tubing is over 100ft at her home. EMS reports no hypoxia in route. Patient complains of increasing shortness of breath and cough with clear sputum produced. She is on 2.5 L at home oxygen and this is not sufficient currently. On exam patient has scattered wheezes and rhonchi. Heart regular rate and rhythm. Abdomen benign. No significant peripheral edema. EKG  The Ekg interpreted by me shows  normal sinus rhythm with a rate of 83  Axis is   Left axis deviation  QTc is  normal  Intervals and Durations are unremarkable.       ST Segments: normal  No significant change from prior EKG dated 8/5/23    Labs Reviewed   CBC WITH AUTO DIFFERENTIAL - Abnormal; Notable for the following components:       Result Value    RBC 3.19 (*)     Hemoglobin 9.7 (*)     Hematocrit 29.3 (*)     Lymphocytes Absolute 0.8 (*)     All other components within normal limits   COMPREHENSIVE METABOLIC PANEL W/ REFLEX TO MG FOR LOW K - Abnormal; Notable for the following components:    Sodium 133 (*)     Chloride 96 (*)     Creatinine 1.3 (*)     Est, Glom Filt Rate 41 (*)     Albumin 3.2 (*)     Albumin/Globulin Ratio 0.9 (*)     ALT 7 (*)     All other components within normal limits   TROPONIN - Abnormal; Notable for the following components:    Troponin, High Sensitivity 57 (*)     All other Acknowledged Yumi Bella    08/19/23 1536 08/19/23 1536  promethazine-dextromethorphan (PROMETHAZINE-DM) 6.25-15 MG/5ML syrup 5 mL  EVERY 4 HOURS PRN         Acknowledged Yumi Bella    08/19/23 1535 08/19/23 1536  benzonatate (TESSALON) capsule 100 mg  3 TIMES DAILY PRN         Last MAR action: Given - by VERÓNICA MILLARD on 08/19/23 at 1826 Yumi Bella    08/19/23 1532 08/19/23 1536  sodium chloride flush 0.9 % injection 5-40 mL  PRN         Last MAR action: Given - by VERÓNICA MILLARD on 08/19/23 at [facility]    08/19/23 1532 08/19/23 1536  0.9 % sodium chloride infusion  PRN         Acknowledged Cecilia BETHEA    08/19/23 1532 08/19/23 1536  ondansetron (ZOFRAN-ODT) disintegrating tablet 4 mg  EVERY 8 HOURS PRN        See Hyperspace for full Linked Orders Report. Acknowledged Yumi Bella    08/19/23 1532 08/19/23 1536  ondansetron (ZOFRAN) injection 4 mg  EVERY 6 HOURS PRN        See Hyperspace for full Linked Orders Report. Acknowledged Yumi Bella    08/19/23 1532 08/19/23 1536  acetaminophen (TYLENOL) tablet 650 mg  EVERY 6 HOURS PRN        See Hyperspace for full Linked Orders Report. Acknowledged Yumi Bella    08/19/23 1532 08/19/23 1536  acetaminophen (TYLENOL) suppository 650 mg  EVERY 6 HOURS PRN        See Hyperspace for full Linked Orders Report.     Acknowledged Yumi Bella    08/19/23 1532 08/19/23 1536  polyethylene glycol (GLYCOLAX) packet 17 g  DAILY PRN         Acknowledged Cecilia Ferro C    08/19/23 1515 08/19/23 1506  methylPREDNISolone sodium succ (SOLU-MEDROL) injection 60 mg  ONCE         Last MAR action: Given - by Savana Kimbrough on 08/19/23 at [address] [facility] E    08/19/23 1515 08/19/23 1506  aspirin tablet 325 mg  ONCE         Last MAR action: Given - by Savana Kimbrough on 08/19/23 at [address] [facility] [address] E    08/19/23 1515 08/19/23 1506  ipratropium 0.5 mg-albuterol 2.5 mg (DUONEB) nebulizer solution 1 Dose  ONCE obstructive pulmonary disease (HCC)     Acute on chronic respiratory failure with hypoxemia (HCC) 09/23/2021    Chronic anxiety     Acute decompensated heart failure (HCC)     Acute respiratory failure with hypoxia and hypercarbia (HCC) 09/28/2021    Acute kidney injury ([address])     Elevated procalcitonin     COPD, severe (HCC)     Anxiety     Severe anxiety     Severe protein-calorie malnutrition (HCC)     HCAP (healthcare-associated pneumonia)     Pleural effusion     Hyperglycemia     Sepsis ([address]) 03/30/2022    Tachycardia     Acute hypoxemic respiratory failure (HCC)     Septic shock (HCC)     Bacteremia     Urinary tract infection without hematuria     Atrial fibrillation with RVR ([facility])     Electrolyte disorder     Myocardiopathy ([address]) 05/08/2023    Acute on chronic systolic congestive heart failure ([address]) 05/08/2023    PAF (paroxysmal atrial fibrillation) ([address]) 05/08/2023    Chronic hypoxemic respiratory failure ([address]) 38/99/6065    Acute systolic CHF (congestive heart failure) ([address]) 05/08/2023    Uterine enlargement 07/05/2023    Acute cystitis with hematuria 07/06/2023    Hypomagnesemia 07/06/2023    Chronic atrial fibrillation ([address]) 07/06/2023    Vaginal bleeding 07/06/2023    Hematochezia 08/03/2023     Resolved Ambulatory Problems     Diagnosis Date Noted    Elevated troponin 09/04/2019    Vaginal bleeding 07/05/2023    Pre-operative cardiovascular examination 07/07/2023     Past Medical History:   Diagnosis Date    CHF (congestive heart failure) (HCC)     COPD (chronic obstructive pulmonary disease) ([address])     Heart attack ([address]) 2019    History of blood transfusion     Hypertension     MRSA (methicillin resistant staph aureus) culture positive 09/22/2019    Thyroid disease        Chronic Conditions: Hyperlipidemia, coronary disease, CHF    CONSULTS: (Who and What was discussed)  IP CONSULT TO HOSPITALIST  IP CONSULT TO CARDIOLOGY    CC/HPI Summary, DDx, ED Course, and Reassessment: Patient had increased oxygen

## 2023-08-19 NOTE — PROGRESS NOTES
4 Eyes Skin Assessment     NAME:  Lennie Smith  YOB: 1940  MEDICAL RECORD NUMBER:  [id number]    The patient is being assessed for  Admission    I agree that at least one RN has performed a thorough Head to Toe Skin Assessment on the patient. ALL assessment sites listed below have been assessed. Areas assessed by both nurses:    Head, Face, Ears, Shoulders, Back, Chest, Arms, Elbows, Hands, Sacrum. Buttock, Coccyx, Ischium, and Legs. Feet and Heels        Does the Patient have a Wound?  No noted wound(s)       Trey Prevention initiated by RN: Yes  Wound Care Orders initiated by RN: No    Pressure Injury (Stage 3,4, Unstageable, DTI, NWPT, and Complex wounds) if present, place Wound referral order by RN under : No    New Ostomies, if present place, Ostomy referral order under : No     Nurse 1 eSignature: Electronically signed by Jeffrey Mendoza RN on 8/19/23 at 6:19 PM EDT      Nurse 2 eSignature: Electronically signed by Carlos Jiang RN on 8/19/23 at 6:20 PM EDT

## 2023-08-19 NOTE — ED PROVIDER NOTES
[address] [place] [address]  ED  EMERGENCY DEPARTMENT ENCOUNTER        Pt Name: Jasmina Mccabe  MRN: [id number]  [address] [facility] 1940  Date of evaluation: 8/19/2023  Provider: Stephanie Arriaga PA-C  PCP: Markel Harper MD  ED Attending: Michele Schulte MD       I have seen and evaluated this patient with my supervising physician Michele Schulte, [address]:     Chief Complaint   Patient presents with    Shortness of Breath     Patient comes from home for increased shortness of breath and cough since Thursday. Patient states she has been doing breathing treatments at home since Thursday with little relief. Patient states she wears 2.5L O2 at home. EMS reports patient's oxygen tubing is over 100ft at her home. EMS reports no hypoxia in route. HISTORY OF PRESENT ILLNESS:      History provided by the patient. No limitations. Jasmina Mccabe is a 81 y.o. female who arrives to the ED by EMS. This patient has known COPD. She is oxygen dependent on 2 L. The patient reports increased shortness of breath and cough that is been going on for the last few days but has been worse today. She has been doing her breathing treatments and using her inhalers at home with little relief. EMS were called to bring her in. Though she is dyspneic they reported she maintained sats in the 90s on her baseline nasal cannula oxygen. The patient denies any associated chest pain or palpitations. She has not had a fever to her knowledge. No GI upset. Symptoms exacerbated with any activity. She gets some alleviation at rest.    Nursing Notes were reviewed     REVIEW OF SYSTEMS:     Review of Systems  Positives and pertinent negatives as per HPI.       PAST MEDICAL HISTORY:     Past Medical History:   Diagnosis Date    NADJA (acute kidney injury) ([address])     Asthma     CAD (coronary artery disease)     CHF (congestive heart failure) (HCC)     unsure    Chronic anxiety     COPD (chronic obstructive pulmonary disease) ([address])

## 2023-08-19 NOTE — PROGRESS NOTES
08/19/23 1631   RT Protocol   History Pulmonary Disease 2   Respiratory pattern 4   Breath sounds 2   Cough 0   Indications for Bronchodilator Therapy Decreased or absent breath sounds   Bronchodilator Assessment Score 8

## 2023-08-20 PROBLEM — R06.02 SOB (SHORTNESS OF BREATH): Status: ACTIVE | Noted: 2023-08-20

## 2023-08-20 LAB
ALBUMIN SERPL-MCNC: 3.3 G/DL (ref 3.4–5)
ALBUMIN SERPL-MCNC: 3.5 G/DL (ref 3.4–5)
ALBUMIN/GLOB SERPL: 1 {RATIO} (ref 1.1–2.2)
ALP SERPL-CCNC: 80 U/L (ref 40–129)
ALT SERPL-CCNC: 7 U/L (ref 10–40)
ANION GAP SERPL CALCULATED.3IONS-SCNC: 12 MMOL/L (ref 3–16)
ANION GAP SERPL CALCULATED.3IONS-SCNC: 14 MMOL/L (ref 3–16)
AST SERPL-CCNC: 18 U/L (ref 15–37)
BILIRUB SERPL-MCNC: <0.2 MG/DL (ref 0–1)
BUN SERPL-MCNC: 18 MG/DL (ref 7–20)
BUN SERPL-MCNC: 20 MG/DL (ref 7–20)
CALCIUM SERPL-MCNC: 8.6 MG/DL (ref 8.3–10.6)
CALCIUM SERPL-MCNC: 8.9 MG/DL (ref 8.3–10.6)
CHLORIDE SERPL-SCNC: 95 MMOL/L (ref 99–110)
CHLORIDE SERPL-SCNC: 95 MMOL/L (ref 99–110)
CHLORIDE UR-SCNC: 87 MMOL/L
CHOLEST SERPL-MCNC: 139 MG/DL (ref 0–199)
CO2 SERPL-SCNC: 20 MMOL/L (ref 21–32)
CO2 SERPL-SCNC: 24 MMOL/L (ref 21–32)
CREAT SERPL-MCNC: 1.2 MG/DL (ref 0.6–1.2)
CREAT SERPL-MCNC: 1.3 MG/DL (ref 0.6–1.2)
DEPRECATED RDW RBC AUTO: 14.8 % (ref 12.4–15.4)
EKG ATRIAL RATE: 83 BPM
EKG ATRIAL RATE: 88 BPM
EKG DIAGNOSIS: NORMAL
EKG DIAGNOSIS: NORMAL
EKG P AXIS: 72 DEGREES
EKG P AXIS: 93 DEGREES
EKG P-R INTERVAL: 156 MS
EKG P-R INTERVAL: 160 MS
EKG Q-T INTERVAL: 380 MS
EKG Q-T INTERVAL: 386 MS
EKG QRS DURATION: 78 MS
EKG QRS DURATION: 82 MS
EKG QTC CALCULATION (BAZETT): 453 MS
EKG QTC CALCULATION (BAZETT): 459 MS
EKG R AXIS: -44 DEGREES
EKG R AXIS: -66 DEGREES
EKG T AXIS: 56 DEGREES
EKG T AXIS: 63 DEGREES
EKG VENTRICULAR RATE: 83 BPM
EKG VENTRICULAR RATE: 88 BPM
GFR SERPLBLD CREATININE-BSD FMLA CKD-EPI: 41 ML/MIN/{1.73_M2}
GFR SERPLBLD CREATININE-BSD FMLA CKD-EPI: 45 ML/MIN/{1.73_M2}
GLUCOSE BLD-MCNC: 169 MG/DL (ref 70–99)
GLUCOSE SERPL-MCNC: 107 MG/DL (ref 70–99)
GLUCOSE SERPL-MCNC: 89 MG/DL (ref 70–99)
HCT VFR BLD AUTO: 27.6 % (ref 36–48)
HDLC SERPL-MCNC: 61 MG/DL (ref 40–60)
HGB BLD-MCNC: 8.8 G/DL (ref 12–16)
LDLC SERPL CALC-MCNC: 60 MG/DL
MCH RBC QN AUTO: 29.6 PG (ref 26–34)
MCHC RBC AUTO-ENTMCNC: 32.1 G/DL (ref 31–36)
MCV RBC AUTO: 92.3 FL (ref 80–100)
PERFORMED ON: ABNORMAL
PHOSPHATE SERPL-MCNC: 2.5 MG/DL (ref 2.5–4.9)
PLATELET # BLD AUTO: 285 K/UL (ref 135–450)
PMV BLD AUTO: 7.6 FL (ref 5–10.5)
POTASSIUM SERPL-SCNC: 4 MMOL/L (ref 3.5–5.1)
POTASSIUM SERPL-SCNC: 4.3 MMOL/L (ref 3.5–5.1)
POTASSIUM UR-SCNC: 24.5 MMOL/L
PROT SERPL-MCNC: 6.6 G/DL (ref 6.4–8.2)
RBC # BLD AUTO: 2.99 M/UL (ref 4–5.2)
SODIUM SERPL-SCNC: 129 MMOL/L (ref 136–145)
SODIUM SERPL-SCNC: 131 MMOL/L (ref 136–145)
SODIUM UR-SCNC: 81 MMOL/L
TRIGL SERPL-MCNC: 88 MG/DL (ref 0–150)
VLDLC SERPL CALC-MCNC: 18 MG/DL
WBC # BLD AUTO: 2.1 K/UL (ref 4–11)

## 2023-08-20 PROCEDURE — 6360000002 HC RX W HCPCS: Performed by: FAMILY MEDICINE

## 2023-08-20 PROCEDURE — 80061 LIPID PANEL: CPT

## 2023-08-20 PROCEDURE — 96372 THER/PROPH/DIAG INJ SC/IM: CPT

## 2023-08-20 PROCEDURE — 6370000000 HC RX 637 (ALT 250 FOR IP): Performed by: NURSE PRACTITIONER

## 2023-08-20 PROCEDURE — 82436 ASSAY OF URINE CHLORIDE: CPT

## 2023-08-20 PROCEDURE — 94761 N-INVAS EAR/PLS OXIMETRY MLT: CPT

## 2023-08-20 PROCEDURE — 6370000000 HC RX 637 (ALT 250 FOR IP): Performed by: FAMILY MEDICINE

## 2023-08-20 PROCEDURE — 96376 TX/PRO/DX INJ SAME DRUG ADON: CPT

## 2023-08-20 PROCEDURE — 84133 ASSAY OF URINE POTASSIUM: CPT

## 2023-08-20 PROCEDURE — 94640 AIRWAY INHALATION TREATMENT: CPT

## 2023-08-20 PROCEDURE — 96366 THER/PROPH/DIAG IV INF ADDON: CPT

## 2023-08-20 PROCEDURE — 94669 MECHANICAL CHEST WALL OSCILL: CPT

## 2023-08-20 PROCEDURE — G0378 HOSPITAL OBSERVATION PER HR: HCPCS

## 2023-08-20 PROCEDURE — 1200000000 HC SEMI PRIVATE

## 2023-08-20 PROCEDURE — 2580000003 HC RX 258: Performed by: FAMILY MEDICINE

## 2023-08-20 PROCEDURE — 93010 ELECTROCARDIOGRAM REPORT: CPT | Performed by: INTERNAL MEDICINE

## 2023-08-20 PROCEDURE — 2700000000 HC OXYGEN THERAPY PER DAY

## 2023-08-20 PROCEDURE — 93005 ELECTROCARDIOGRAM TRACING: CPT | Performed by: FAMILY MEDICINE

## 2023-08-20 PROCEDURE — 80053 COMPREHEN METABOLIC PANEL: CPT

## 2023-08-20 PROCEDURE — 85027 COMPLETE CBC AUTOMATED: CPT

## 2023-08-20 PROCEDURE — 2500000003 HC RX 250 WO HCPCS: Performed by: FAMILY MEDICINE

## 2023-08-20 PROCEDURE — 83935 ASSAY OF URINE OSMOLALITY: CPT

## 2023-08-20 PROCEDURE — 99223 1ST HOSP IP/OBS HIGH 75: CPT | Performed by: INTERNAL MEDICINE

## 2023-08-20 PROCEDURE — 36415 COLL VENOUS BLD VENIPUNCTURE: CPT

## 2023-08-20 PROCEDURE — 84300 ASSAY OF URINE SODIUM: CPT

## 2023-08-20 PROCEDURE — 96361 HYDRATE IV INFUSION ADD-ON: CPT

## 2023-08-20 RX ORDER — TORSEMIDE 20 MG/1
20 TABLET ORAL DAILY
Status: DISCONTINUED | OUTPATIENT
Start: 2023-08-20 | End: 2023-08-23 | Stop reason: HOSPADM

## 2023-08-20 RX ORDER — PREDNISONE 20 MG/1
40 TABLET ORAL DAILY
Status: DISPENSED | OUTPATIENT
Start: 2023-08-20 | End: 2023-08-23

## 2023-08-20 RX ORDER — ROFLUMILAST 500 UG/1
500 TABLET ORAL DAILY
Status: DISCONTINUED | OUTPATIENT
Start: 2023-08-20 | End: 2023-08-23 | Stop reason: HOSPADM

## 2023-08-20 RX ORDER — FLUTICASONE PROPIONATE 50 MCG
1 SPRAY, SUSPENSION (ML) NASAL DAILY PRN
Status: DISCONTINUED | OUTPATIENT
Start: 2023-08-20 | End: 2023-08-23 | Stop reason: HOSPADM

## 2023-08-20 RX ORDER — BUSPIRONE HYDROCHLORIDE 5 MG/1
5 TABLET ORAL 3 TIMES DAILY
Status: DISCONTINUED | OUTPATIENT
Start: 2023-08-20 | End: 2023-08-21

## 2023-08-20 RX ORDER — PANTOPRAZOLE SODIUM 40 MG/1
40 TABLET, DELAYED RELEASE ORAL DAILY
Status: DISCONTINUED | OUTPATIENT
Start: 2023-08-20 | End: 2023-08-23 | Stop reason: HOSPADM

## 2023-08-20 RX ORDER — FERROUS SULFATE 325(65) MG
325 TABLET ORAL
Status: DISCONTINUED | OUTPATIENT
Start: 2023-08-20 | End: 2023-08-23 | Stop reason: HOSPADM

## 2023-08-20 RX ORDER — GUAIFENESIN 600 MG/1
600 TABLET, EXTENDED RELEASE ORAL DAILY
Status: DISCONTINUED | OUTPATIENT
Start: 2023-08-20 | End: 2023-08-21

## 2023-08-20 RX ORDER — ACETAMINOPHEN 500 MG
500 TABLET ORAL EVERY 6 HOURS PRN
Status: DISCONTINUED | OUTPATIENT
Start: 2023-08-20 | End: 2023-08-23 | Stop reason: HOSPADM

## 2023-08-20 RX ADMIN — ACETAMINOPHEN 500 MG: 500 TABLET ORAL at 13:50

## 2023-08-20 RX ADMIN — GUAIFENESIN 600 MG: 600 TABLET ORAL at 08:47

## 2023-08-20 RX ADMIN — IPRATROPIUM BROMIDE AND ALBUTEROL SULFATE 1 DOSE: .5; 3 SOLUTION RESPIRATORY (INHALATION) at 15:46

## 2023-08-20 RX ADMIN — BUSPIRONE HYDROCHLORIDE 5 MG: 5 TABLET ORAL at 13:50

## 2023-08-20 RX ADMIN — ROFLUMILAST 500 MCG: 500 TABLET ORAL at 11:02

## 2023-08-20 RX ADMIN — PANTOPRAZOLE SODIUM 40 MG: 40 TABLET, DELAYED RELEASE ORAL at 08:47

## 2023-08-20 RX ADMIN — IPRATROPIUM BROMIDE AND ALBUTEROL SULFATE 1 DOSE: .5; 3 SOLUTION RESPIRATORY (INHALATION) at 20:27

## 2023-08-20 RX ADMIN — ATORVASTATIN CALCIUM 60 MG: 40 TABLET, FILM COATED ORAL at 20:06

## 2023-08-20 RX ADMIN — SERTRALINE 50 MG: 50 TABLET, FILM COATED ORAL at 09:49

## 2023-08-20 RX ADMIN — BUSPIRONE HYDROCHLORIDE 5 MG: 5 TABLET ORAL at 08:47

## 2023-08-20 RX ADMIN — IPRATROPIUM BROMIDE AND ALBUTEROL SULFATE 1 DOSE: .5; 3 SOLUTION RESPIRATORY (INHALATION) at 12:06

## 2023-08-20 RX ADMIN — SODIUM CHLORIDE, PRESERVATIVE FREE 10 ML: 5 INJECTION INTRAVENOUS at 08:47

## 2023-08-20 RX ADMIN — METOPROLOL TARTRATE 25 MG: 25 TABLET, FILM COATED ORAL at 08:47

## 2023-08-20 RX ADMIN — METOPROLOL TARTRATE 25 MG: 25 TABLET, FILM COATED ORAL at 20:06

## 2023-08-20 RX ADMIN — IPRATROPIUM BROMIDE AND ALBUTEROL SULFATE 1 DOSE: .5; 3 SOLUTION RESPIRATORY (INHALATION) at 08:14

## 2023-08-20 RX ADMIN — SODIUM CHLORIDE, PRESERVATIVE FREE 10 ML: 5 INJECTION INTRAVENOUS at 20:07

## 2023-08-20 RX ADMIN — METHYLPREDNISOLONE SODIUM SUCCINATE 40 MG: 40 INJECTION, POWDER, FOR SOLUTION INTRAMUSCULAR; INTRAVENOUS at 03:39

## 2023-08-20 RX ADMIN — TORSEMIDE 20 MG: 20 TABLET ORAL at 09:49

## 2023-08-20 RX ADMIN — BUSPIRONE HYDROCHLORIDE 5 MG: 5 TABLET ORAL at 20:06

## 2023-08-20 RX ADMIN — ASPIRIN 81 MG 81 MG: 81 TABLET ORAL at 08:08

## 2023-08-20 RX ADMIN — FERROUS SULFATE TAB 325 MG (65 MG ELEMENTAL FE) 325 MG: 325 (65 FE) TAB at 08:47

## 2023-08-20 RX ADMIN — DOXYCYCLINE 100 MG: 100 INJECTION, POWDER, LYOPHILIZED, FOR SOLUTION INTRAVENOUS at 05:58

## 2023-08-20 RX ADMIN — METHYLPREDNISOLONE SODIUM SUCCINATE 40 MG: 40 INJECTION, POWDER, FOR SOLUTION INTRAMUSCULAR; INTRAVENOUS at 08:47

## 2023-08-20 RX ADMIN — ENOXAPARIN SODIUM 30 MG: 100 INJECTION SUBCUTANEOUS at 08:08

## 2023-08-20 RX ADMIN — ACETAMINOPHEN 500 MG: 500 TABLET ORAL at 20:06

## 2023-08-20 ASSESSMENT — PAIN SCALES - GENERAL
PAINLEVEL_OUTOF10: 0

## 2023-08-20 NOTE — CONSULTS
[facility], [address] [facility] [place]                                  CONSULTATION    PATIENT NAME: Dolores Otoole                 :        1940  MED REC NO:   [id number]                          ROOM:       2747  ACCOUNT NO:   [de-identified]                           ADMIT DATE: 2023  PROVIDER:     Theresa Jennings MD    CONSULT DATE:  2023    REASON FOR CONSULTATION:  Elevated troponin level. HISTORY OF PRESENT ILLNESS:  The patient is an 52-year-old woman with  history of coronary artery disease, cardiomyopathy and advanced COPD,  who is admitted with complaints of worsening shortness of breath. At  the time of admission, the troponin level is recorded at 57 and has  since declined to 49. She denies chest pain, palpitations, near-syncope  or syncope. She has had some cough and increasing shortness of breath  over the past several days. She does have history of coronary artery disease and ultimately  underwent cardiac catheterization and coronary artery bypass surgery in  2019. Most recent stress test from 2021 demonstrates no inducible  myocardial ischemia. Most recent echo from 2023 demonstrates  recovery of ejection fraction to 50% to 55%. ECG at the time of  admission demonstrates sinus rhythm at 88 beats per minute. She has  left axis deviation, but no evidence for acute myocardial injury or  ischemia. Laboratory evaluation also demonstrates an increase in her  serum creatinine from her last admission earlier this month from a  baseline of 1 to 1.3. PAST MEDICAL HISTORY:  1. Coronary artery disease status post coronary artery bypass surgery  8493.  2.  Systolic and diastolic congestive heart failure. 3.  Advanced oxygen-dependent COPD. 4.  Significant anxiety disorder. 5.  Hypertension. 6.  Hyperlipidemia. 6.  Thyroid disease.     MEDICATIONS:  At the time of admission include, Lipitor 60 mg p.o. at  bedtime, BuSpar 5 mg p.o. t.i.d., iron sulfate 325 mg p.o. daily,  Mucinex 600 mg p.o. daily, metoprolol tartrate 25 mg p.o. b.i.d.,  Protonix 40 mg p.o. daily. , potassium chloride 20 mEq p.o. daily,  Daliresp 500 mcg p.o. daily, Zoloft 50 mg p.o. daily, torsemide 20 mg  p.o. daily. ALLERGIES:  Include LATEX and CODEINE. SOCIAL HISTORY:  The patient is a former smoker having stopped smoking  about to 4 years ago. She does not consume alcohol at this time. FAMILY HISTORY:  Remarkable for cancer in the mother and history of  heart disease and stroke in the father and in one female sibling. REVIEW OF SYSTEMS:  Demonstrates no recent change in appetite or change  in weight. The patient has not had recent fever or chills. She  describes the above-mentioned cough and shortness of breath. She has  not had chest pain or palpitations. Her functional status is limited as  she is in a wheelchair most of the time. All other components of  14-system review are negative. PHYSICAL EXAMINATION:  VITAL SIGNS:  Blood pressure is 142/100, heart rate is 96 beats per  minute and regular. The patient is afebrile. GENERAL:  She is awake, alert, oriented and in no discomfort. HEENT:  Exam demonstrates normocephalic and atraumatic head. There is  no scleral icterus. Pupils are round and reactive. NECK:  Supple without thyromegaly. There is no cervical  lymphadenopathy. LUNGS:  Demonstrate diminished breath sounds. There are few bibasilar  crackles. There is no consolidation. There is no wheezing. CARDIOVASCULAR:  Exam reveals a regular rate and rhythm. The apical  impulse is discrete. S1 and S2 are normal.  There is no audible murmur  or gallop. The jugular venous pressure is difficult to assess. ABDOMEN:  Obese, soft, nontender. EXTREMITIES:  Demonstrate no pitting, pretibial or dependent edema. There is no cyanosis. There is no evidence for chronic venous stasis.   SKIN:

## 2023-08-20 NOTE — PLAN OF CARE
Problem: Discharge Planning  Goal: Discharge to home or other facility with appropriate resources  Outcome: Progressing  Flowsheets (Taken 8/20/2023 1535)  Discharge to home or other facility with appropriate resources:   Identify barriers to discharge with patient and caregiver   Arrange for needed discharge resources and transportation as appropriate     Problem: Skin/Tissue Integrity  Goal: Absence of new skin breakdown  Description: 1. Monitor for areas of redness and/or skin breakdown  2. Assess vascular access sites hourly  3. Every 4-6 hours minimum:  Change oxygen saturation probe site  4. Every 4-6 hours:  If on nasal continuous positive airway pressure, respiratory therapy assess nares and determine need for appliance change or resting period.   Outcome: Progressing     Problem: Chronic Conditions and Co-morbidities  Goal: Patient's chronic conditions and co-morbidity symptoms are monitored and maintained or improved  Outcome: Progressing  Flowsheets (Taken 8/20/2023 1535)  Care Plan - Patient's Chronic Conditions and Co-Morbidity Symptoms are Monitored and Maintained or Improved:   Monitor and assess patient's chronic conditions and comorbid symptoms for stability, deterioration, or improvement   Collaborate with multidisciplinary team to address chronic and comorbid conditions and prevent exacerbation or deterioration

## 2023-08-20 NOTE — PROGRESS NOTES
MD notified: pt has tremors in legs that progress up body. Pt denies being cold at time and states tremors have been occurring last 2 months to 1 year. Pt states she feels like there is ice in her veins previous to tremors occurring. They only last for a minute. BS assessed and 169 at time. Bp 142/100 . Pt on solumedrol for suspected infection. Pt verbalizes Sob, RR 18 and SPO2 100% on 2.5 L (baseline for pt). Pt appears anxious and concerned about tremors.

## 2023-08-21 PROBLEM — R06.00 DYSPNEA: Status: ACTIVE | Noted: 2023-08-20

## 2023-08-21 PROBLEM — E87.1 HYPONATREMIA: Status: ACTIVE | Noted: 2023-08-21

## 2023-08-21 PROBLEM — N18.31 STAGE 3A CHRONIC KIDNEY DISEASE (HCC): Status: ACTIVE | Noted: 2023-08-21

## 2023-08-21 LAB
ALBUMIN SERPL-MCNC: 3.1 G/DL (ref 3.4–5)
ALBUMIN/GLOB SERPL: 1.2 {RATIO} (ref 1.1–2.2)
ALP SERPL-CCNC: 61 U/L (ref 40–129)
ALT SERPL-CCNC: 6 U/L (ref 10–40)
ANION GAP SERPL CALCULATED.3IONS-SCNC: 11 MMOL/L (ref 3–16)
AST SERPL-CCNC: 14 U/L (ref 15–37)
BILIRUB SERPL-MCNC: <0.2 MG/DL (ref 0–1)
BUN SERPL-MCNC: 25 MG/DL (ref 7–20)
CALCIUM SERPL-MCNC: 8.5 MG/DL (ref 8.3–10.6)
CHLORIDE SERPL-SCNC: 97 MMOL/L (ref 99–110)
CO2 SERPL-SCNC: 26 MMOL/L (ref 21–32)
CREAT SERPL-MCNC: 1.3 MG/DL (ref 0.6–1.2)
DEPRECATED RDW RBC AUTO: 14.2 % (ref 12.4–15.4)
GFR SERPLBLD CREATININE-BSD FMLA CKD-EPI: 41 ML/MIN/{1.73_M2}
GLUCOSE SERPL-MCNC: 113 MG/DL (ref 70–99)
HCT VFR BLD AUTO: 24.3 % (ref 36–48)
HGB BLD-MCNC: 8.2 G/DL (ref 12–16)
MCH RBC QN AUTO: 31.5 PG (ref 26–34)
MCHC RBC AUTO-ENTMCNC: 33.9 G/DL (ref 31–36)
MCV RBC AUTO: 92.8 FL (ref 80–100)
OSMOLALITY UR: 292 MOSM/KG (ref 390–1070)
PLATELET # BLD AUTO: 268 K/UL (ref 135–450)
PMV BLD AUTO: 7.2 FL (ref 5–10.5)
POTASSIUM SERPL-SCNC: 3.9 MMOL/L (ref 3.5–5.1)
PROT SERPL-MCNC: 5.7 G/DL (ref 6.4–8.2)
RBC # BLD AUTO: 2.62 M/UL (ref 4–5.2)
SODIUM SERPL-SCNC: 134 MMOL/L (ref 136–145)
WBC # BLD AUTO: 5.1 K/UL (ref 4–11)

## 2023-08-21 PROCEDURE — 6370000000 HC RX 637 (ALT 250 FOR IP): Performed by: NURSE PRACTITIONER

## 2023-08-21 PROCEDURE — 80053 COMPREHEN METABOLIC PANEL: CPT

## 2023-08-21 PROCEDURE — 85027 COMPLETE CBC AUTOMATED: CPT

## 2023-08-21 PROCEDURE — 94669 MECHANICAL CHEST WALL OSCILL: CPT

## 2023-08-21 PROCEDURE — 6370000000 HC RX 637 (ALT 250 FOR IP): Performed by: FAMILY MEDICINE

## 2023-08-21 PROCEDURE — 6360000002 HC RX W HCPCS: Performed by: FAMILY MEDICINE

## 2023-08-21 PROCEDURE — 94761 N-INVAS EAR/PLS OXIMETRY MLT: CPT

## 2023-08-21 PROCEDURE — 36415 COLL VENOUS BLD VENIPUNCTURE: CPT

## 2023-08-21 PROCEDURE — 97535 SELF CARE MNGMENT TRAINING: CPT

## 2023-08-21 PROCEDURE — 97161 PT EVAL LOW COMPLEX 20 MIN: CPT

## 2023-08-21 PROCEDURE — 94640 AIRWAY INHALATION TREATMENT: CPT

## 2023-08-21 PROCEDURE — 1200000000 HC SEMI PRIVATE

## 2023-08-21 PROCEDURE — 97530 THERAPEUTIC ACTIVITIES: CPT

## 2023-08-21 PROCEDURE — 97166 OT EVAL MOD COMPLEX 45 MIN: CPT

## 2023-08-21 PROCEDURE — 2580000003 HC RX 258: Performed by: FAMILY MEDICINE

## 2023-08-21 PROCEDURE — 99232 SBSQ HOSP IP/OBS MODERATE 35: CPT | Performed by: NURSE PRACTITIONER

## 2023-08-21 PROCEDURE — 2700000000 HC OXYGEN THERAPY PER DAY

## 2023-08-21 RX ORDER — GUAIFENESIN 600 MG/1
600 TABLET, EXTENDED RELEASE ORAL 2 TIMES DAILY
Status: DISCONTINUED | OUTPATIENT
Start: 2023-08-21 | End: 2023-08-23 | Stop reason: HOSPADM

## 2023-08-21 RX ORDER — BUSPIRONE HYDROCHLORIDE 5 MG/1
5 TABLET ORAL 3 TIMES DAILY PRN
Status: DISCONTINUED | OUTPATIENT
Start: 2023-08-21 | End: 2023-08-23 | Stop reason: HOSPADM

## 2023-08-21 RX ORDER — METOPROLOL SUCCINATE 50 MG/1
50 TABLET, EXTENDED RELEASE ORAL
Status: DISCONTINUED | OUTPATIENT
Start: 2023-08-21 | End: 2023-08-23 | Stop reason: HOSPADM

## 2023-08-21 RX ORDER — MECOBALAMIN 5000 MCG
5 TABLET,DISINTEGRATING ORAL NIGHTLY
Status: DISCONTINUED | OUTPATIENT
Start: 2023-08-21 | End: 2023-08-23 | Stop reason: HOSPADM

## 2023-08-21 RX ORDER — CALCIUM CARBONATE 500 MG/1
500 TABLET, CHEWABLE ORAL 3 TIMES DAILY PRN
Status: DISCONTINUED | OUTPATIENT
Start: 2023-08-21 | End: 2023-08-23 | Stop reason: HOSPADM

## 2023-08-21 RX ADMIN — PANTOPRAZOLE SODIUM 40 MG: 40 TABLET, DELAYED RELEASE ORAL at 09:42

## 2023-08-21 RX ADMIN — SERTRALINE 50 MG: 50 TABLET, FILM COATED ORAL at 09:42

## 2023-08-21 RX ADMIN — Medication 5 MG: at 21:46

## 2023-08-21 RX ADMIN — ACETAMINOPHEN 500 MG: 500 TABLET ORAL at 09:42

## 2023-08-21 RX ADMIN — BUSPIRONE HYDROCHLORIDE 5 MG: 5 TABLET ORAL at 03:42

## 2023-08-21 RX ADMIN — GUAIFENESIN 600 MG: 600 TABLET ORAL at 09:42

## 2023-08-21 RX ADMIN — ENOXAPARIN SODIUM 30 MG: 100 INJECTION SUBCUTANEOUS at 09:41

## 2023-08-21 RX ADMIN — PREDNISONE 40 MG: 20 TABLET ORAL at 09:42

## 2023-08-21 RX ADMIN — METOPROLOL TARTRATE 25 MG: 25 TABLET, FILM COATED ORAL at 09:42

## 2023-08-21 RX ADMIN — GUAIFENESIN 600 MG: 600 TABLET ORAL at 21:46

## 2023-08-21 RX ADMIN — ANTACID TABLETS 500 MG: 500 TABLET, CHEWABLE ORAL at 03:42

## 2023-08-21 RX ADMIN — TORSEMIDE 20 MG: 20 TABLET ORAL at 09:42

## 2023-08-21 RX ADMIN — SODIUM CHLORIDE, PRESERVATIVE FREE 10 ML: 5 INJECTION INTRAVENOUS at 09:42

## 2023-08-21 RX ADMIN — SODIUM CHLORIDE, PRESERVATIVE FREE 10 ML: 5 INJECTION INTRAVENOUS at 21:46

## 2023-08-21 RX ADMIN — ATORVASTATIN CALCIUM 60 MG: 40 TABLET, FILM COATED ORAL at 21:46

## 2023-08-21 RX ADMIN — IPRATROPIUM BROMIDE AND ALBUTEROL SULFATE 1 DOSE: .5; 3 SOLUTION RESPIRATORY (INHALATION) at 15:29

## 2023-08-21 RX ADMIN — IPRATROPIUM BROMIDE AND ALBUTEROL SULFATE 1 DOSE: .5; 3 SOLUTION RESPIRATORY (INHALATION) at 07:40

## 2023-08-21 RX ADMIN — IPRATROPIUM BROMIDE AND ALBUTEROL SULFATE 1 DOSE: .5; 3 SOLUTION RESPIRATORY (INHALATION) at 20:28

## 2023-08-21 RX ADMIN — FERROUS SULFATE TAB 325 MG (65 MG ELEMENTAL FE) 325 MG: 325 (65 FE) TAB at 09:42

## 2023-08-21 RX ADMIN — ROFLUMILAST 500 MCG: 500 TABLET ORAL at 09:42

## 2023-08-21 RX ADMIN — ASPIRIN 81 MG 81 MG: 81 TABLET ORAL at 09:42

## 2023-08-21 NOTE — PROGRESS NOTES
Physician Progress Note      PATIENT:               Jodi Chung  CSN #:                  [id number]  :                       1940  ADMIT DATE:       2023 12:51 PM  [address] [facility] DATE:  RESPONDING  PROVIDER #:        Shantel Velazco TEXT:    Patient admitted with SOB. Noted documentation of NADJA per attending and CKD   3 per nephrology consult. If possible, please document in progress notes and discharge summary if you   are evaluating and /or treating any of the following:[[NADJA on CKD confirmed,   please give additional supporting criteria of support[de-identified] NADJA on CKD confirmed##   please give additional supporting criteria of support. }}    The medical record reflects the following:  Risk Factors: CHF, CKD, COPD  Clinical Indicators: per Neprhology consult -\"Chronic kidney disease stage   3. Justina Brash Justina Brash SCr 1.3-1.4 from 2023\"  SCr on arrival and trend of this admit-1.3->1.3->1.2->1.3  Treatment: Nephrology consult, IVF, labs, supportive care    Defined by Kidney Disease Improving Global Outcomes (KDIGO) clinical practice   guideline for acute kidney injury:  -Increase in SCr by greater than or equal to 0.3 mg/dl within 48 hours; or  -Increase or decrease in SCr to greater than or equal to 1.5 times baseline,   which is known or presumed to have occurred within the prior 7 days; or  -Urine volume < 0.5ml/kg/h for 6 hours. Thank you, Fahad Nath RN BSN  Options provided:  -- CKD 3 confirmed, NADJA ruled out  -- Other - I will add my own diagnosis  -- Disagree - Not applicable / Not valid  -- Disagree - Clinically unable to determine / Unknown  -- Refer to Clinical Documentation Reviewer    PROVIDER RESPONSE TEXT:    CKD 3 confirmed, NADJA ruled out.     Query created by: Della Helms on 2023 11:34 AM      Electronically signed by:  Nadine Bloch 2023 1:17 PM

## 2023-08-21 NOTE — PROGRESS NOTES
08/21/23 1354   Encounter Summary   Encounter Overview/Reason  Advance Care Planning   Service Provided For: Patient   Referral/Consult From: Nurse   Support System Children   Last Encounter  08/21/23  (ACP conversation, Pt will call when ready to complete)   Complexity of Encounter Low   Begin Time 1245   End Time  1305   Total Time Calculated 20 min   Advance Care Planning   Type ACP conversation

## 2023-08-21 NOTE — PROGRESS NOTES
Physical Therapy  Facility/Department: [place] [id number] - MED SURG  Physical Therapy Initial Assessment/treatment    Name: Olivier El  : 1940  MRN: [id number]  Date of Service: 2023    Discharge Recommendations:  Subacute/Skilled Nursing Facility   PT Equipment Recommendations  Equipment Needed: Yes (rolling walker)    If pt is unable to be seen after this session, please let this note serve as discharge summary. Please see case management note for discharge disposition. Thank you. Patient Diagnosis(es): The primary encounter diagnosis was Dyspnea, unspecified type. Diagnoses of COPD exacerbation ([address]) and Elevated troponin were also pertinent to this visit. Past Medical History:  has a past medical history of NADJA (acute kidney injury) ([address]), Asthma, CAD (coronary artery disease), CHF (congestive heart failure) ([address]), Chronic anxiety, COPD (chronic obstructive pulmonary disease) ([address]), GERD (gastroesophageal reflux disease), Heart attack ([address]), History of blood transfusion, Hyperlipidemia, Hypertension, MRSA (methicillin resistant staph aureus) culture positive, OA (osteoarthritis), and Thyroid disease. Past Surgical History:  has a past surgical history that includes  section; Mastectomy, partial (Left); bronchoscopy (2019); Cardiac catheterization (2019); Coronary artery bypass graft (N/A, 2019); Colonoscopy (N/A, 2020); Sigmoidoscopy (2020); sigmoidoscopy (N/A, 2020); IR MIDLINE CATH (2021); Colonoscopy (N/A, 10/22/2021); Upper gastrointestinal endoscopy (N/A, 2023); Upper gastrointestinal endoscopy (N/A, 2023); Upper gastrointestinal endoscopy (2023); and Colonoscopy (N/A, 2023). Assessment   Body Structures, Functions, Activity Limitations Requiring Skilled Therapeutic Intervention: Decreased functional mobility ; Decreased strength;Decreased endurance  Assessment: Pt [facility] to [facility].  Pt reports baseline of Son (sons SO stays at night, daughter stays over weekend, help comes throughout the day (feeds and surpervises))  Type of Home: House  Home Layout: One level  Home Access: Stairs to enter without rails  Entrance Stairs - Number of Steps: 2  Bathroom Shower/Tub: Tub/Shower unit  Bathroom Toilet: Handicap height  Bathroom Equipment: Grab bars in shower, Grab bars around toilet, Toilet raiser, Shower chair  Home Equipment: Oxygen, Electric scooter, Walker, standard, Cane  Has the patient had two or more falls in the past year or any fall with injury in the past year?: No  Receives Help From: Personal care attendant, Family  ADL Assistance: Independent  Homemaking Assistance: Needs assistance  Transfer Assistance: Independent  Leisure & Hobbies: \"I don't do anything\"    Vision/Hearing  Vision  Vision: Impaired  Vision Exceptions: Cataracts (\"never was right after cataract surgery\")  Hearing  Hearing: Exceptions to [facility]  Hearing Exceptions: Hard of hearing/hearing concerns      Cognition   Orientation  Overall Orientation Status: Within Normal Limits  Orientation Level: Oriented X4  Cognition  Overall Cognitive Status: WNL     Objective   Pulse: 79  Heart Rate Source: Monitor  BP: 123/70  BP Location: Right upper arm  BP Method: Automatic  Patient Position: Semi fowlers  MAP (Calculated): 88  Respirations: 20  SpO2: 100 %  O2 Device: Nasal cannula  Comment: 2.5        Gross Assessment  AROM: Generally decreased, functional  PROM: Generally decreased, functional  Strength: Generally decreased, functional                 Bed Mobility Training  Bed Mobility Training: Yes  Overall Level of Assistance: Supervision; Additional time; Adaptive equipment (handrails and HOB raised)  Interventions: Verbal cues; Safety awareness training  Rolling: Modified independent (rails used. Roll x2 each side for changing of brief and bedpan usage)  Supine to Sit: Supervision; Additional time;Setup (reported dizziness at EOB and did not

## 2023-08-21 NOTE — CONSULTS
Comprehensive Nutrition Assessment    Type and Reason for Visit:  Initial, Consult    Nutrition Recommendations/Plan:   Modify diet to regular and encourage PO intake   FR per MD   RD to add ensure, ensure clear, magic cups   Monitor nutrition adequacy, pertinent labs, bowel habits, wt changes, and clinical progress     Malnutrition Assessment:  Malnutrition Status: At risk for malnutrition (Comment) (08/21/23 1344)    Context:  Acute Illness     Findings of the 6 clinical characteristics of malnutrition:  Energy Intake:  Mild decrease in energy intake (Comment)  Muscle Mass Loss:  Mild muscle mass loss Clavicles (pectoralis & deltoids), Temples (temporalis)  Fluid Accumulation:  No significant fluid accumulation      Nutrition Assessment:    Consult for ONS: 81 y.o. f admitted w/ SOB. PMHx significant for Cardiomyopathy, Advanced COPD, CAD s/p CABG, Depression/anxiety, HTN, HLD, Thyroid disease, and SDCHF. On cardiac diet, RD to liberalize to regular diet. Pt reports poor appetite PTA but appetite has improved since admission, PO intakes % of meals. Reports stable weight. Wt varying in EMR. Pt willing to trial ONS, pt does not like regular ensure, encouraged pt to try one. Will add ensure clear and magic cups per pt request. Encouraged pt to consume protein w/ each meal including chicken, cottage cheese, yogurt, eggs. Pt compliant. Continue to encourage PO intake, will continue to monitor. Nutrition Related Findings:    Na 134. BG WNL. + 2 BM yesterday. + 1.4 L since admission. Wound Type: Open Wounds (Open wound on buttocks)       Current Nutrition Intake & Therapies:    Average Meal Intake: %  Average Supplements Intake: None Ordered, Unable to assess  ADULT DIET;  Regular; 1500 ml  ADULT ORAL NUTRITION SUPPLEMENT; Dinner, Lunch; Standard High Calorie/High Protein Oral Supplement    Anthropometric Measures:  Height: 5' 3\" (160 cm)  Ideal Body Weight (IBW): 115 lbs (52 kg)       Current Body Weight:

## 2023-08-21 NOTE — PLAN OF CARE
Problem: Respiratory - Adult  Goal: Achieves optimal ventilation and oxygenation  Outcome: Progressing  Flowsheets (Taken 8/20/2023 2236)  Achieves optimal ventilation and oxygenation:   Assess for changes in respiratory status   Assess for changes in mentation and behavior   Position to facilitate oxygenation and minimize respiratory effort   Oxygen supplementation based on oxygen saturation or arterial blood gases   Encourage broncho-pulmonary hygiene including cough, deep breathe, incentive spirometry   Assess and instruct to report shortness of breath or any respiratory difficulty   Respiratory therapy support as indicated

## 2023-08-21 NOTE — CONSULTS
PALLIATIVE MEDICINE CONSULTATION     Patient name:Terri Robert   HLJ:[id number]    EYF:[birth year]  Room/Bed:0370/0370-01   LOS: 1 day         Date of consult:8/21/2023    Consult Information  Palliative Medicine Consult performed by: CLIFTON Shoemaker CNP, CNP    Inpatient consult to Palliative Care  Consult performed by: CLIFTON Grajeda CNP  Consult ordered by: CLIFTON Cardoza CNP             ASSESSMENT/RECOMMENDATIONS     81 y.o. female with COPD admitted with worsening shortness of breath      Symptom Management:  Goals of Care-   Wants to stay aggressive with medical care. Would consider all medical interventions at this time, including surgery. Interested in SNF. Ancipitates she will die in the next several years, but for now has good quality of life and wants to live as long as possible. Agreeable to a home palliative care referral (placed) in hopes of reducing the number of future hospital admissions. Wants to remain a full code. Elevated troponin - cardiology consulted. Chronically elevated. No EKG changes this admission    Shortness of breath -  COPD exacerbation. Baseline 2.5 L home O2 requirement . PO steroids and breathing treatments. Oxygen requirement currently at baseline requirement. States she WOULD want intubation       Patient/Family Goals of Care :    8/21/23    Spoke with patient at the bedside. Patient is alert and oriented and decisional.  Patient agreeable to discussion with me today. Patient lives alone, always has family with her, mostly just because she feels anxious being alone. Says she is still able to do nearly all ADLs herself. Anticipates she will die in the next several years and feels good all arrangements have been made already for her death. Still does crafts at home and has a good QOL. Has another grand baby on the way she is excited for. Wants to live as long as possible.   Tells story of being told she was too old for heart surgery, but then she found a doctor to do it and is doing well. She doesn't think she is too old for certain treatments and wants to remain 100 % aggressive with medical care at this time. States her kids feel the same. Wants to go to SNF. Says she would not mind LTC placement, but she can't pay for it and would not qualify for medicaid. Feels depressed that she can no longer function at the level she had when she was younger, but says she is working on accepting it. Understands that she is likely to have more hospitalizations in the future. She is okay with that, but is not looking forward to it. She is agreeable to trying home palliative care to try to help mitigate some of the future hospitalizations, but she does not know that it will really help anything because she thinks the Danial Osorio she has now already do a great job. She says the things she gets hospitalized for now are not because she is not taking care of herself, its just that she is getting older and things are falling apart. Did attempt to discuss code status. Patient adamant she wants to be a full code, including chest compressions and intubation. Patient has 4 kids: Kirstin Aponte and Felton. She wants [facility] to be her decision maker. She is agreeable to  consult to complete the HCPOA. Total ACP/GOC discussion lisa: 50 minutes    Disposition/Discharge Plan:   - pending medical course  - An outpatient [facility] referral can be ordered for post-discharge follow up when patient returns home if medically appropriate (palliative care will follow for this). Advance Directives:       The patient has the following advanced directives on file:  [address] [place] [address] Dr of [address] Will ACP-Advance Directive ACP-Power of     Not on File Not on File Not on File Not on File            The patient has appointed the following active healthcare agents:    Primary Decision

## 2023-08-21 NOTE — PROGRESS NOTES
Occupational Therapy  Facility/Department: [place] [address] - MED SURG  Occupational Therapy Initial Assessment and Treatment    Name: Adrien Reyes  : 1940  MRN: [id number]  Date of Service: 2023    Discharge Recommendations:  Subacute/Skilled Nursing Facility  OT Equipment Recommendations  Other: defer     Therapy discharge recommendations take into account each patient's current medical complexities and are subject to input/oversight from the patient's healthcare team.     Barriers to Home Discharge:   [] Steps to access home entry or bed/bath:   [x] Unable to transfer, ambulate, or propel wheelchair household distances without assist   [] Limited available assist at home upon discharge    [] Patient or family requests d/c to post-acute facility    [x] Poor cognition/safety awareness for d/c to home alone    [] Unable to maintain ordered weight bearing status    [] Patient with salient signs of long-standing immobility   [x] Decreased independence with ADLs   [x] Increased risk for falls   [] Other:    If pt is unable to be seen after this session, please let this note serve as discharge summary. Please see case management note for discharge disposition. Thank you. Patient Diagnosis(es): The primary encounter diagnosis was Dyspnea, unspecified type. Diagnoses of COPD exacerbation ([address]) and Elevated troponin were also pertinent to this visit. Past Medical History:  has a past medical history of NADJA (acute kidney injury) ([address]), Asthma, CAD (coronary artery disease), CHF (congestive heart failure) ([address]), Chronic anxiety, COPD (chronic obstructive pulmonary disease) ([address]), GERD (gastroesophageal reflux disease), Heart attack ([address]), History of blood transfusion, Hyperlipidemia, Hypertension, MRSA (methicillin resistant staph aureus) culture positive, OA (osteoarthritis), and Thyroid disease. Past Surgical History:  has a past surgical history that includes  section;  Mastectomy, partial training  Rolling: Modified independent (rails used. Roll x2 each side for changing of brief and bedpan usage)  Supine to Sit: Stand-by assistance; Adaptive equipment; Additional time (HOB raised, bed rails)  Sit to Supine: Stand-by assistance  Scooting: Other (comment)  Balance  Sitting: Intact  Transfer Training  Transfer Training: Yes  Interventions: Demonstration;Visual cues; Safety awareness training  Sit to Stand: Stand-by assistance  Stand to Sit: Stand-by assistance  Bed to Chair: Minimum assistance; Adaptive equipment; Additional time  Gait Training  Gait Training: No        ADL  LE Dressing: Maximum assistance  LE Dressing Skilled Clinical Factors: while supine pt able to bridge for therapist to change brief  Toileting: Maximum assistance  Toileting Skilled Clinical Factors: Pt with soiled brief upon arrival, required assistance for pericare. Activity Tolerance  Activity Tolerance: Treatment limited secondary to medical complications;Treatment limited secondary to agitation (dizziness at EOB)  Activity Tolerance Comments: Pt did not want to stand up or walk to the bathroom  Bed mobility  Supine to Sit: Contact guard assistance  Sit to Supine: Contact guard assistance  Scooting: Contact guard assistance  Transfers  Sit to stand: Minimal assistance  Stand to sit: Minimal assistance  Transfer Comments: Pt with impulsivity during transfer requiring max vc for safety and min A for balance. Vision  Vision: Impaired  Vision Exceptions: Cataracts  Hearing  Hearing: Exceptions to [facility]  Hearing Exceptions: Hard of hearing/hearing concerns  Cognition  Overall Cognitive Status: WNL  Orientation  Overall Orientation Status: Within Normal Limits  Orientation Level: Oriented X4                  Education Given To: Patient  Education Provided: Role of Therapy;Transfer Training;Plan of Care;Energy Conservation;Home Exercise Program;Fall Prevention Strategies;Precautions; ADL Adaptive Strategies  Education Method: Demonstration;Verbal  Barriers to Learning: None  Education Outcome: Continued education needed      AM-PAC Score        AM-PAC Inpatient Daily Activity Raw Score: 16 (08/21/23 1534)  AM-PAC Inpatient ADL T-Scale Score : 35.96 (08/21/23 1534)  ADL Inpatient CMS 0-100% Score: 53.32 (08/21/23 1534)  ADL Inpatient CMS G-Code Modifier : CK (08/21/23 1534)      Goals  Short Term Goals  Time Frame for Short Term Goals: 8/29, Unless otherwise noted  Short Term Goal 1: Pt will complete toileting with SBA by 8/27  Short Term Goal 2: Pt will complete toilet transfer with SBA  Short Term Goal 3: Pt will complete LB dressing with SBA  Short Term Goal 4: Pt will complete UE exer program with SUP  Patient Goals   Patient goals :  \"Get better\"       Therapy Time   Individual Concurrent Group Co-treatment   Time In 1343         Time Out 1411         Minutes 28         Timed Code Treatment Minutes: 18 Minutes (10 minute eval)       Valerio Valadez, OT

## 2023-08-21 NOTE — CARE COORDINATION
Case Management Assessment  Initial Evaluation    Date/Time of Evaluation: 8/21/2023 1:59 PM  Assessment Completed by: Fabio Ram RN    If patient is discharged prior to next notation, then this note serves as note for discharge by case management. Patient Name: Murray Vergara                   YOB: 1940  Diagnosis: COPD (chronic obstructive pulmonary disease) ([address]) [J44.9]  Elevated troponin [R77.8]  COPD exacerbation ([address]) [J44.1]  Dyspnea, unspecified type [R06.00]  SOB (shortness of breath) [R06.02]                   Date / Time: 8/19/2023 12:51 PM    Patient Admission Status: Inpatient   Readmission Risk (Low < 19, Mod (19-27), High > 27): Readmission Risk Score: 28.6    Current PCP: Blake Spencer MD  PCP verified by CM? Chart Reviewed: Yes      History Provided by:    Patient Orientation:      Patient Cognition:      Hospitalization in the last 30 days (Readmission):  Yes    If yes, Readmission Assessment in  Navigator will be completed. Advance Directives:      Code Status: Full Code   Patient's Primary Decision Maker is:      Primary Decision Maker: [place] Smith - Child - [phone]    Secondary Decision Maker: Hafsa Tapia - Niece/Nephew - [phone]    Discharge Planning:    Patient lives with: Children Type of Home: House  Primary Care Giver:    Patient Support Systems include: Children, Family Members   Current Financial resources:    Current community resources:    Current services prior to admission: C-pap, Oxygen Therapy            Current DME: Oxygen Therapy (Comment), Shower Chair            Type of Home Care services:  [place], [place]    ADLS  Prior functional level:    Current functional level:      PT AM-PAC: 14 /24  OT AM-PAC:   /24    Family can provide assistance at DC: Would you like Case Management to discuss the discharge plan with any other family members/significant others, and if so, who?     Plans to Return to Present providers was provided to:     Patient Representative Name:       The Patient and/or Patient Representative Agree with the Discharge Plan?       Sudhir Quintero RN  Case Management Department

## 2023-08-21 NOTE — PROGRESS NOTES
Hospital Medicine Progress Note      Date of Admission: 8/19/2023  Hospital Day: 3    Chief Admission Complaint:  SOB     Subjective:     Patient is in bed this morning. She tells me that she feels better and denies any chest pain, shortness of breath, wheezing, but still has a cough with some phlegm. We discussed possibly going to a skilled nursing facility for rehabilitation. She is deciding if that is a possibility. Otherwise she has no complaints    Presenting Admission History: This is a 81 y.o. female who presented to [facility] with shortness of breath. PMHx significant for Cardiomyopathy, Advanced COPD, CAD s/p CABG in 2019, Depression/anxiety, HTN, HLD, Thyroid disease, and SDCHF. States symptoms ongoing the last 3 days shortness of breath increased congestion, symptoms not getting better now having palpitations. Denies chest pain or chest pressure. Does report fevers and chills. Reports lower quadrant abdominal pain, status post D&C on July 24. Recent admission for anemia/bloody stools. In the ER  Elevated respiratory rate  Creatinine 1.3  Hemoglobin 9.7, increased from 8/7/2023   BNP 1024, decreased from 7/5/2023  Troponin 57 repeat 61  ABG ph 7.43, pCO2 41, PO2 109.4, HCO 27     Treated for COPD in the ER    Assessment/Plan:      Current Principal Problem:  COPD (chronic obstructive pulmonary disease) (HCC)    SOB, possible small exacerbation of Advanced COPD  - Uses 2.5 L/NC at home. Not requiring increase to oxygen at this time.   - PCT 0.16  - Blood cultures NGTD   - Stopped antx therapy in the setting of negative CXR and PCT results  - Stop IV steroids, continue oral prednisone 40 mg day x 3 days  - Continue breathing treatments     Elevated troponin and palpitations  History of CAD status post CABG 2019  Ischemic cardiomyopathy with recovered EF  HFpEf  Hypertension  - Discussed patient with Cardiology and they feel that with no EKG changes that her elevated troponin is interpreted for clinical significance. Recent Labs     08/19/23  1332 08/20/23  0550 08/21/23  0539   WBC 4.6 2.1* 5.1   HGB 9.7* 8.8* 8.2*   HCT 29.3* 27.6* 24.3*    285 268       Recent Labs     08/20/23  0550 08/20/23  1158 08/21/23  0539   * 131* 134*   K 4.0 4.3 3.9   CL 95* 95* 97*   CO2 20* 24 26   BUN 18 20 25*   CREATININE 1.3* 1.2 1.3*   CALCIUM 8.6 8.9 8.5   PHOS  --  2.5  --        Recent Labs     08/19/23  1332 08/19/23  1421 08/19/23  2030   PROBNP 1,024*  --   --    TROPHS 57* 61* 49*       No results for input(s): LABA1C in the last 72 hours. Recent Labs     08/19/23  1332 08/20/23  0550 08/21/23  0539   AST 20 18 14*   ALT 7* 7* 6*   BILITOT <0.2 <0.2 <0.2   ALKPHOS 83 80 61       No results for input(s): INR, LACTA, TSH in the last 72 hours. Urine Cultures:   Lab Results   Component Value Date/Time    LABURIN  07/05/2023 06:11 PM     25,000 CFU/ml  Susceptibility testing of penicillin and other beta lactams is  not necessary for beta hemolytic Streptococci since resistant  strains have not been identified. (CLSI M100)       Blood Cultures:   Lab Results   Component Value Date/Time    [facility]  08/19/2023 08:30 PM     No Growth to date. Any change in status will be called. Lab Results   Component Value Date/Time    BLOODCULT2  08/19/2023 08:35 PM     No Growth to date. Any change in status will be called.      Organism:   Lab Results   Component Value Date/Time    [facility] BHS Group B (Strep agalacticae) 07/05/2023 06:11 PM         Kaylee Omalley, APRN - CNP

## 2023-08-22 LAB
ALBUMIN SERPL-MCNC: 3.1 G/DL (ref 3.4–5)
ALBUMIN/GLOB SERPL: 1 {RATIO} (ref 1.1–2.2)
ALP SERPL-CCNC: 64 U/L (ref 40–129)
ALT SERPL-CCNC: 7 U/L (ref 10–40)
ANION GAP SERPL CALCULATED.3IONS-SCNC: 11 MMOL/L (ref 3–16)
AST SERPL-CCNC: 15 U/L (ref 15–37)
BILIRUB SERPL-MCNC: <0.2 MG/DL (ref 0–1)
BUN SERPL-MCNC: 27 MG/DL (ref 7–20)
CALCIUM SERPL-MCNC: 8.9 MG/DL (ref 8.3–10.6)
CHLORIDE SERPL-SCNC: 97 MMOL/L (ref 99–110)
CO2 SERPL-SCNC: 27 MMOL/L (ref 21–32)
CREAT SERPL-MCNC: 1.3 MG/DL (ref 0.6–1.2)
DEPRECATED RDW RBC AUTO: 14.5 % (ref 12.4–15.4)
GFR SERPLBLD CREATININE-BSD FMLA CKD-EPI: 41 ML/MIN/{1.73_M2}
GLUCOSE SERPL-MCNC: 95 MG/DL (ref 70–99)
HCT VFR BLD AUTO: 27.6 % (ref 36–48)
HGB BLD-MCNC: 9.3 G/DL (ref 12–16)
MCH RBC QN AUTO: 30.7 PG (ref 26–34)
MCHC RBC AUTO-ENTMCNC: 33.7 G/DL (ref 31–36)
MCV RBC AUTO: 91.1 FL (ref 80–100)
PLATELET # BLD AUTO: 312 K/UL (ref 135–450)
PMV BLD AUTO: 7.3 FL (ref 5–10.5)
POTASSIUM SERPL-SCNC: 3.9 MMOL/L (ref 3.5–5.1)
PROT SERPL-MCNC: 6.1 G/DL (ref 6.4–8.2)
RBC # BLD AUTO: 3.03 M/UL (ref 4–5.2)
SODIUM SERPL-SCNC: 135 MMOL/L (ref 136–145)
WBC # BLD AUTO: 6.1 K/UL (ref 4–11)

## 2023-08-22 PROCEDURE — 1200000000 HC SEMI PRIVATE

## 2023-08-22 PROCEDURE — 36415 COLL VENOUS BLD VENIPUNCTURE: CPT

## 2023-08-22 PROCEDURE — 6370000000 HC RX 637 (ALT 250 FOR IP): Performed by: FAMILY MEDICINE

## 2023-08-22 PROCEDURE — 85027 COMPLETE CBC AUTOMATED: CPT

## 2023-08-22 PROCEDURE — 97530 THERAPEUTIC ACTIVITIES: CPT

## 2023-08-22 PROCEDURE — 97110 THERAPEUTIC EXERCISES: CPT

## 2023-08-22 PROCEDURE — 94640 AIRWAY INHALATION TREATMENT: CPT

## 2023-08-22 PROCEDURE — 2580000003 HC RX 258: Performed by: FAMILY MEDICINE

## 2023-08-22 PROCEDURE — 2700000000 HC OXYGEN THERAPY PER DAY

## 2023-08-22 PROCEDURE — 6370000000 HC RX 637 (ALT 250 FOR IP): Performed by: NURSE PRACTITIONER

## 2023-08-22 PROCEDURE — 80053 COMPREHEN METABOLIC PANEL: CPT

## 2023-08-22 PROCEDURE — 6360000002 HC RX W HCPCS: Performed by: FAMILY MEDICINE

## 2023-08-22 PROCEDURE — 94669 MECHANICAL CHEST WALL OSCILL: CPT

## 2023-08-22 PROCEDURE — 94761 N-INVAS EAR/PLS OXIMETRY MLT: CPT

## 2023-08-22 RX ORDER — PANTOPRAZOLE SODIUM 40 MG/1
40 TABLET, DELAYED RELEASE ORAL DAILY
Qty: 30 TABLET | Refills: 3 | Status: CANCELLED | OUTPATIENT
Start: 2023-08-23

## 2023-08-22 RX ORDER — DEXTROMETHORPHAN HYDROBROMIDE AND PROMETHAZINE HYDROCHLORIDE 15; 6.25 MG/5ML; MG/5ML
5 SYRUP ORAL EVERY 4 HOURS PRN
DISCHARGE
Start: 2023-08-22 | End: 2023-08-29

## 2023-08-22 RX ORDER — ASPIRIN 81 MG/1
81 TABLET, CHEWABLE ORAL DAILY
Qty: 30 TABLET | Refills: 3 | Status: ON HOLD | DISCHARGE
Start: 2023-08-23 | End: 2023-09-01 | Stop reason: SDUPTHER

## 2023-08-22 RX ORDER — ATORVASTATIN CALCIUM 40 MG/1
60 TABLET, FILM COATED ORAL NIGHTLY
Qty: 30 TABLET | Refills: 3 | DISCHARGE
Start: 2023-08-22 | End: 2023-08-30

## 2023-08-22 RX ORDER — MECOBALAMIN 5000 MCG
5 TABLET,DISINTEGRATING ORAL NIGHTLY
DISCHARGE
Start: 2023-08-22

## 2023-08-22 RX ORDER — PANTOPRAZOLE SODIUM 40 MG/1
40 TABLET, DELAYED RELEASE ORAL DAILY
Qty: 30 TABLET | Refills: 3 | DISCHARGE
Start: 2023-08-23

## 2023-08-22 RX ORDER — METOPROLOL SUCCINATE 50 MG/1
50 TABLET, EXTENDED RELEASE ORAL
Qty: 30 TABLET | Refills: 3 | DISCHARGE
Start: 2023-08-22

## 2023-08-22 RX ADMIN — GUAIFENESIN 600 MG: 600 TABLET ORAL at 21:26

## 2023-08-22 RX ADMIN — SODIUM CHLORIDE, PRESERVATIVE FREE 10 ML: 5 INJECTION INTRAVENOUS at 08:38

## 2023-08-22 RX ADMIN — FLUTICASONE PROPIONATE 1 SPRAY: 50 SPRAY, METERED NASAL at 04:58

## 2023-08-22 RX ADMIN — GUAIFENESIN 600 MG: 600 TABLET ORAL at 08:22

## 2023-08-22 RX ADMIN — ATORVASTATIN CALCIUM 60 MG: 40 TABLET, FILM COATED ORAL at 21:26

## 2023-08-22 RX ADMIN — Medication 5 MG: at 21:26

## 2023-08-22 RX ADMIN — BUSPIRONE HYDROCHLORIDE 5 MG: 5 TABLET ORAL at 04:58

## 2023-08-22 RX ADMIN — IPRATROPIUM BROMIDE AND ALBUTEROL SULFATE 1 DOSE: .5; 3 SOLUTION RESPIRATORY (INHALATION) at 12:22

## 2023-08-22 RX ADMIN — SERTRALINE 50 MG: 50 TABLET, FILM COATED ORAL at 08:22

## 2023-08-22 RX ADMIN — ENOXAPARIN SODIUM 30 MG: 100 INJECTION SUBCUTANEOUS at 08:22

## 2023-08-22 RX ADMIN — ROFLUMILAST 500 MCG: 500 TABLET ORAL at 08:37

## 2023-08-22 RX ADMIN — PREDNISONE 40 MG: 20 TABLET ORAL at 08:22

## 2023-08-22 RX ADMIN — IPRATROPIUM BROMIDE AND ALBUTEROL SULFATE 1 DOSE: .5; 3 SOLUTION RESPIRATORY (INHALATION) at 08:38

## 2023-08-22 RX ADMIN — BUSPIRONE HYDROCHLORIDE 5 MG: 5 TABLET ORAL at 14:48

## 2023-08-22 RX ADMIN — FERROUS SULFATE TAB 325 MG (65 MG ELEMENTAL FE) 325 MG: 325 (65 FE) TAB at 08:22

## 2023-08-22 RX ADMIN — METOPROLOL SUCCINATE 50 MG: 50 TABLET, EXTENDED RELEASE ORAL at 21:26

## 2023-08-22 RX ADMIN — SODIUM CHLORIDE, PRESERVATIVE FREE 10 ML: 5 INJECTION INTRAVENOUS at 21:26

## 2023-08-22 RX ADMIN — IPRATROPIUM BROMIDE AND ALBUTEROL SULFATE 1 DOSE: .5; 3 SOLUTION RESPIRATORY (INHALATION) at 20:21

## 2023-08-22 RX ADMIN — TORSEMIDE 20 MG: 20 TABLET ORAL at 08:22

## 2023-08-22 RX ADMIN — IPRATROPIUM BROMIDE AND ALBUTEROL SULFATE 1 DOSE: .5; 3 SOLUTION RESPIRATORY (INHALATION) at 15:47

## 2023-08-22 RX ADMIN — PANTOPRAZOLE SODIUM 40 MG: 40 TABLET, DELAYED RELEASE ORAL at 08:22

## 2023-08-22 RX ADMIN — ASPIRIN 81 MG 81 MG: 81 TABLET ORAL at 08:22

## 2023-08-22 NOTE — PROGRESS NOTES
Hospital Medicine Progress Note      Date of Admission: 8/19/2023  Hospital Day: 4    Chief Admission Complaint:  SOB     Subjective:     Patient is in bed this morning. She tells me that she feels better and denies any chest pain, shortness of breath, wheezing, but still has a cough and some nasal congestion this morning. We discuss ed the plan is to d/c to CC/SNF tomorrow morning. She acknowledges understanding. Presenting Admission History: This is a 81 y.o. female who presented to [facility] with shortness of breath. PMHx significant for Cardiomyopathy, Advanced COPD, CAD s/p CABG in 2019, Depression/anxiety, HTN, HLD, Thyroid disease, and SDCHF. States symptoms ongoing the last 3 days shortness of breath increased congestion, symptoms not getting better now having palpitations. Denies chest pain or chest pressure. Does report fevers and chills. Reports lower quadrant abdominal pain, status post D&C on July 24. Recent admission for anemia/bloody stools. In the ER  Elevated respiratory rate  Creatinine 1.3  Hemoglobin 9.7, increased from 8/7/2023   BNP 1024, decreased from 7/5/2023  Troponin 57 repeat 61  ABG ph 7.43, pCO2 41, PO2 109.4, HCO 27     Treated for COPD in the ER    Assessment/Plan:      Current Principal Problem:  COPD (chronic obstructive pulmonary disease) (HCC)    SOB, small exacerbation of Advanced COPD  - Uses 2.5 L/NC at home. Not requiring increase to oxygen at this time. - PCT 0.16  - Blood cultures NGTD   - Stopped antx therapy in the setting of negative CXR and PCT results  - Stop IV steroids, continue oral prednisone 40 mg day x 3 days, completed today  - Continue breathing treatments     Elevated troponin and palpitations  History of CAD status post CABG 2019  Ischemic cardiomyopathy with recovered EF  HFpEf  Hypertension  - Cardiology consulted: they feel that with no EKG changes that her elevated troponin is most likely 2/2 COPD exacerbation.   1 Dose Inhalation Q4H WA RT    morphine  4 mg IntraVENous Once     PRN Meds: calcium carbonate, busPIRone, acetaminophen, fluticasone, sodium chloride flush, sodium chloride, ondansetron **OR** ondansetron, polyethylene glycol, benzonatate, promethazine-dextromethorphan, nitroGLYCERIN     Labs:  Personally reviewed and interpreted for clinical significance. Recent Labs     08/20/23  0550 08/21/23  0539 08/22/23  0557   WBC 2.1* 5.1 6.1   HGB 8.8* 8.2* 9.3*   HCT 27.6* 24.3* 27.6*    268 312       Recent Labs     08/20/23  1158 08/21/23  0539 08/22/23  0557   * 134* 135*   K 4.3 3.9 3.9   CL 95* 97* 97*   CO2 24 26 27   BUN 20 25* 27*   CREATININE 1.2 1.3* 1.3*   CALCIUM 8.9 8.5 8.9   PHOS 2.5  --   --        Recent Labs     08/19/23  1332 08/19/23  1421 08/19/23  2030   PROBNP 1,024*  --   --    TROPHS 57* 61* 49*       No results for input(s): LABA1C in the last 72 hours. Recent Labs     08/20/23  0550 08/21/23  0539 08/22/23  0557   AST 18 14* 15   ALT 7* 6* 7*   BILITOT <0.2 <0.2 <0.2   ALKPHOS 80 61 64       No results for input(s): INR, LACTA, TSH in the last 72 hours. Urine Cultures:   Lab Results   Component Value Date/Time    LABURIN  07/05/2023 06:11 PM     25,000 CFU/ml  Susceptibility testing of penicillin and other beta lactams is  not necessary for beta hemolytic Streptococci since resistant  strains have not been identified. (CLSI M100)       Blood Cultures:   Lab Results   Component Value Date/Time    [facility]  08/19/2023 08:30 PM     No Growth to date. Any change in status will be called. Lab Results   Component Value Date/Time    BLOODCULT2  08/19/2023 08:35 PM     No Growth to date. Any change in status will be called.      Organism:   Lab Results   Component Value Date/Time    ORG BHS Group B (Strep agalacticae) 07/05/2023 06:11 PM         Khushbu Omalley, CLIFTON - CNP

## 2023-08-22 NOTE — CARE COORDINATION
Chart reviewed day 3. Care provided by nephro and IM. Pt is from home with her son. She has O2 at home with her baseline being 2.5L. Pt is set to go to [facility] tomorrow. Pt aware and agreeable. Will continue to follow.  Effie Gonzalez RN

## 2023-08-22 NOTE — PROGRESS NOTES
Physical Therapy  Facility/Department: [facility] - MED SURG  Daily Treatment Note  NAME: Nirmal Vergara  : 1940  MRN: [id number]    Date of Service: 2023    Discharge Recommendations:  Subacute/Skilled Nursing Facility   PT Equipment Recommendations  Equipment Needed: Yes    Therapy discharge recommendations take into account each patient's current medical complexities and are subject to input/oversight from the patient's healthcare team.   Barriers to Home Discharge:   [x] Steps to access home entry or bed/bath:   [x] Unable to transfer, ambulate, or propel wheelchair household distances without assist   [] Limited available assist at home upon discharge    [] Patient or family requests d/c to post-acute facility    [x] Poor cognition/safety awareness for d/c to home alone    []Unable to maintain ordered weight bearing status    [] Patient with salient signs of long-standing immobility   [x] Patient is at risk for falls    [] Other:    If pt is unable to be seen after this session, please let this note serve as discharge summary. Please see case management note for discharge disposition. Thank you. Patient Diagnosis(es): The primary encounter diagnosis was Dyspnea, unspecified type. Diagnoses of COPD exacerbation ([address]) and Elevated troponin were also pertinent to this visit. Assessment   Assessment: Pt presents to [facility] for COPD. Pt presents to PT tx below baseline with decreased mobility, decreased strength, increased agitation and increased pain. Pt vitals checked throughout session with no significant change with activity. Pt repeatedly reports nervousness but cannot verbalize anything that would help besides calling RN for meds. Pt recieved meds and was somewhat agreeable to continue. Pt did not want to go to the bathroom and soiled her brief 2x while PT was in room. Pt refused to walk but was able to stand at EOB for exercises. Pt was left in bed with clean brief.  Pt would continue to benefit from skilled PT to aid in the above deficits and a safe DC to SNF. Activity Tolerance: Treatment limited secondary to medical complications;Treatment limited secondary to agitation  Equipment Needed: Yes     Plan    Physcial Therapy Plan  General Plan: 3-5 times per week  Current Treatment Recommendations: Strengthening;Balance training;Functional mobility training;Transfer training; Endurance training;Gait training;Neuromuscular re-education;Stair training;Home exercise program;Safety education & training;Equipment evaluation, education, & procurement;Patient/Caregiver education & training; Therapeutic activities     Restrictions  Restrictions/Precautions  Restrictions/Precautions: General Precautions, Isolation  Position Activity Restriction  Other position/activity restrictions: 2 L O2, contact Isolation     Subjective    Subjective  Subjective: Pt found in bed sleeping, RN cleared for therapy  Pain: denies pain at rest, pt complains of nervousness, RN notified and pt recieved meds  Orientation  Overall Orientation Status: Within Normal Limits  Orientation Level: Oriented X4  Cognition  Overall Cognitive Status: WNL     Objective   Vitals  Pulse: 97  Heart Rate Source: Monitor  BP: 104/63  BP Location: Right upper arm  BP Method: Automatic  Patient Position: Supine  MAP (Calculated): 77  SpO2: 100 %  O2 Device: Nasal cannula  Comment: 2 L   Bed Mobility Training  Bed Mobility Training: Yes  Overall Level of Assistance: Supervision; Additional time; Adaptive equipment (HOB elevated)  Interventions: Verbal cues; Safety awareness training  Rolling: Modified independent (bed rails used for rolling to change nilda and manage brief change, rolled 2x each side)  Supine to Sit: Supervision; Adaptive equipment; Additional time (HOB elevated, pt will not move until she feels ready)  Sit to Supine: Supervision  Balance  Sitting: Intact  Standing: Intact  Transfer Training  Transfer Training: Yes  Overall Level of

## 2023-08-22 NOTE — DISCHARGE INSTR - COC
Continuity of Care Form    Patient Name: Leif Leyva   :    MRN:  [id number]    Admit date:  2023  Discharge date:  23    Code Status Order: Full Code   Advance Directives:     Admitting Physician:  Sophy Neri MD  PCP: Sudhir Floyd MD    Discharging Nurse: Tina Lau RN  [facility] Unit/Room#: 0670/2929-68  Discharging Unit Phone Number: [phone]    Emergency Contact:   Extended Emergency Contact Information  Primary Emergency Contact: Carlos Mckinney of [address] Phone: [phone]  Relation: Child  Secondary Emergency Contact: [address] Phone: [phone]  Mobile Phone: [phone]  Relation: Niece/Nephew    Past Surgical History:  Past Surgical History:   Procedure Laterality Date    BRONCHOSCOPY  2019    Dr. Rianna Mckeon w/BAL    CARDIAC CATHETERIZATION  2019    Dr. Roland Booth 2020    COLONOSCOPY DIAGNOSTIC performed by Gregorio Tran DO at [address] [facility] 10/22/2021    COLONOSCOPY POLYPECTOMY SNARE/COLD BIOPSY performed by Solomon Maharaj MD at [facility] N/A 2023    COLONOSCOPY POLYPECTOMY [place] performed by Sean Hickey MD at [facility] N/A 2019    OFF PUMP CORONARY ARTERY BYPASS GRAFTING X2, INTERNAL MAMMARY ARTERY, SAPHENOUS VEIN GRAFT performed by Hafsa Goldberg MD at [address] [facility] [address] CATH  2021    IR MIDLINE CATH 2021 [address], PARTIAL Left     SIGMOIDOSCOPY  2020    4 bands    SIGMOIDOSCOPY N/A 2020    FLEX SIG W/ BANDING SIGMOIDOSCOPY DIAGNOSTIC FLEXIBLE [facility] 2023    EGD DIAGNOSTIC ONLY performed by Sean Hickey MD at [facility] 2023    EGD BIOPSY Dressing Status Other (Comment) 08/21/23 0946   Wound Cleansed Not Cleansed 08/21/23 0946   Number of days: 15        Elimination:  Continence: Bowel: Yes  Bladder: Yes. Occasional incontinence. Urinary Catheter: None   Colostomy/Ileostomy/Ileal Conduit: No       Date of Last BM: 8/22/23    Intake/Output Summary (Last 24 hours) at 8/22/2023 1029  Last data filed at 8/21/2023 2319  Gross per 24 hour   Intake 840 ml   Output 950 ml   Net -110 ml     I/O last 3 completed shifts: In: 1200 [P.O.:1200]  Out: 950 [Urine:950]    Safety Concerns: At Risk for Falls    Impairments/Disabilities:      None    Nutrition Therapy:  Current Nutrition Therapy:   - Oral Diet:  General    Routes of Feeding: Oral  Liquids: Thin Liquids  Daily Fluid Restriction: no  Last Modified Barium Swallow with Video (Video Swallowing Test): not done    Treatments at the Time of Hospital Discharge:   Respiratory Treatments: None  Oxygen Therapy:  is on oxygen at 2 L/min per nasal cannula.   Ventilator:    - No ventilator support    Rehab Therapies: Physical Therapy and Occupational Therapy  Weight Bearing Status/Restrictions: No weight bearing restrictions  Other Medical Equipment (for information only, NOT a DME order):  None  Other Treatments: None    Patient's personal belongings (please select all that are sent with patient):  None    RN SIGNATURE:  Electronically signed by Daniel Manuel RN on 8/23/23 at 1:01 PM EDT    CASE MANAGEMENT/SOCIAL WORK SECTION    Inpatient Status Date: ***    Readmission Risk Assessment Score:  Readmission Risk              Risk of Unplanned Readmission:  41           Discharging to Facility/ [address]   Skilled Nursing   [address] [facility] [address] J[place]oti Salomon [address] [phone]     / signature: Electronically signed by Nury Solano RN on 8/23/23 at 8:48 AM EDT    PHYSICIAN SECTION    Prognosis: Fair    Condition at Discharge: Stable    Rehab

## 2023-08-22 NOTE — PROGRESS NOTES
Physician Progress Note      PATIENT:               Vera Garcia  CSN #:                  [id number]  :                       1940  ADMIT DATE:       2023 12:51 PM  [address] [facility] DATE:  RESPONDING  PROVIDER #:        Ko Obando TEXT:    Patient admitted with SOB/cough. Noted documentation of \"Elevated   troponin/NSTEMI\" in progress note  of cardiology. In order to support the   diagnosis of NSTEMI, please refer to 4th universal definition of MI below and   include additional clinical indicators in your documentation. Or please   document if the diagnosis of NSTEMI has been ruled out after study. ? The medical record reflects the following:  Risk Factors: CAD, COPD, CHF, HTN, Afib  Clinical Indicators: per cardiology note -\"ELEVATED TROPONIN / NSTEMI: no   significant rise and fall, chronically elevated, manage conservatively\"  Per attending note -\"Discussed patient with Cardiology and they feel that   with no EKG changes that her elevated troponin is most likely 2/2 COPD   exacerbation. \"  Troponin trend 57->61->49  Treatment: labs, cardiology consult, EKG/telemetry monitoring, supportive care    Fourth Universal Definition of Myocardial Infarction:  Clearly separates MI   from myocardial injury. Patients with elevated blood troponin levels but   without clinical evidence of ischemia are said to have had a myocardial   injury. ? To have a myocardial infarction requires both an elevated troponin   blood test along with at least one of the following:  - Symptoms of acute myocardial ischemia (Types 1 - 5 MI)  - Clinical evidence of ischemia, as evidenced in an electrocardiogram (EKG)   showing new ischemic changes (Type 1, Type 2, Type 3, or Type 4a MI)  - Development of pathological Q waves (Types 1 - 5 MI)  - Imaging evidence of new loss of viable myocardium or new regional wall   motion abnormality in a pattern consistent with an ischemic etiology (Types 1   - 5

## 2023-08-23 VITALS
RESPIRATION RATE: 22 BRPM | HEART RATE: 89 BPM | TEMPERATURE: 97.5 F | OXYGEN SATURATION: 100 % | BODY MASS INDEX: 20.07 KG/M2 | SYSTOLIC BLOOD PRESSURE: 116 MMHG | WEIGHT: 113.3 LBS | HEIGHT: 63 IN | DIASTOLIC BLOOD PRESSURE: 81 MMHG

## 2023-08-23 LAB
ALBUMIN SERPL-MCNC: 3.4 G/DL (ref 3.4–5)
ALBUMIN/GLOB SERPL: 1.2 {RATIO} (ref 1.1–2.2)
ALP SERPL-CCNC: 61 U/L (ref 40–129)
ALT SERPL-CCNC: 8 U/L (ref 10–40)
ANION GAP SERPL CALCULATED.3IONS-SCNC: 10 MMOL/L (ref 3–16)
AST SERPL-CCNC: 14 U/L (ref 15–37)
BACTERIA BLD CULT ORG #2: NORMAL
BACTERIA BLD CULT: NORMAL
BILIRUB SERPL-MCNC: <0.2 MG/DL (ref 0–1)
BUN SERPL-MCNC: 26 MG/DL (ref 7–20)
CALCIUM SERPL-MCNC: 8.6 MG/DL (ref 8.3–10.6)
CHLORIDE SERPL-SCNC: 98 MMOL/L (ref 99–110)
CO2 SERPL-SCNC: 28 MMOL/L (ref 21–32)
CREAT SERPL-MCNC: 1.2 MG/DL (ref 0.6–1.2)
DEPRECATED RDW RBC AUTO: 14.5 % (ref 12.4–15.4)
GFR SERPLBLD CREATININE-BSD FMLA CKD-EPI: 45 ML/MIN/{1.73_M2}
GLUCOSE SERPL-MCNC: 99 MG/DL (ref 70–99)
HCT VFR BLD AUTO: 27.2 % (ref 36–48)
HGB BLD-MCNC: 8.9 G/DL (ref 12–16)
MAGNESIUM SERPL-MCNC: 1.6 MG/DL (ref 1.8–2.4)
MCH RBC QN AUTO: 29.7 PG (ref 26–34)
MCHC RBC AUTO-ENTMCNC: 32.7 G/DL (ref 31–36)
MCV RBC AUTO: 90.8 FL (ref 80–100)
PLATELET # BLD AUTO: 300 K/UL (ref 135–450)
PMV BLD AUTO: 7.4 FL (ref 5–10.5)
POTASSIUM SERPL-SCNC: 3.2 MMOL/L (ref 3.5–5.1)
PROT SERPL-MCNC: 6.2 G/DL (ref 6.4–8.2)
RBC # BLD AUTO: 2.99 M/UL (ref 4–5.2)
SODIUM SERPL-SCNC: 136 MMOL/L (ref 136–145)
WBC # BLD AUTO: 7.4 K/UL (ref 4–11)

## 2023-08-23 PROCEDURE — 97110 THERAPEUTIC EXERCISES: CPT

## 2023-08-23 PROCEDURE — 6370000000 HC RX 637 (ALT 250 FOR IP): Performed by: NURSE PRACTITIONER

## 2023-08-23 PROCEDURE — 94640 AIRWAY INHALATION TREATMENT: CPT

## 2023-08-23 PROCEDURE — 97535 SELF CARE MNGMENT TRAINING: CPT

## 2023-08-23 PROCEDURE — 94669 MECHANICAL CHEST WALL OSCILL: CPT

## 2023-08-23 PROCEDURE — 6370000000 HC RX 637 (ALT 250 FOR IP): Performed by: INTERNAL MEDICINE

## 2023-08-23 PROCEDURE — 36415 COLL VENOUS BLD VENIPUNCTURE: CPT

## 2023-08-23 PROCEDURE — 80053 COMPREHEN METABOLIC PANEL: CPT

## 2023-08-23 PROCEDURE — 6370000000 HC RX 637 (ALT 250 FOR IP): Performed by: FAMILY MEDICINE

## 2023-08-23 PROCEDURE — 6360000002 HC RX W HCPCS: Performed by: FAMILY MEDICINE

## 2023-08-23 PROCEDURE — 83735 ASSAY OF MAGNESIUM: CPT

## 2023-08-23 PROCEDURE — 94761 N-INVAS EAR/PLS OXIMETRY MLT: CPT

## 2023-08-23 PROCEDURE — 85027 COMPLETE CBC AUTOMATED: CPT

## 2023-08-23 PROCEDURE — 2700000000 HC OXYGEN THERAPY PER DAY

## 2023-08-23 RX ORDER — POTASSIUM CHLORIDE 750 MG/1
10 TABLET, EXTENDED RELEASE ORAL DAILY
Status: DISCONTINUED | OUTPATIENT
Start: 2023-08-23 | End: 2023-08-23 | Stop reason: HOSPADM

## 2023-08-23 RX ORDER — RIVAROXABAN 15 MG/1
15 TABLET, FILM COATED ORAL DAILY
Status: ON HOLD | COMMUNITY
Start: 2023-07-31 | End: 2023-09-01 | Stop reason: HOSPADM

## 2023-08-23 RX ORDER — LANOLIN ALCOHOL/MO/W.PET/CERES
400 CREAM (GRAM) TOPICAL DAILY
Qty: 30 TABLET | Refills: 0 | DISCHARGE
Start: 2023-08-24

## 2023-08-23 RX ORDER — POTASSIUM CHLORIDE 20 MEQ/1
40 TABLET, EXTENDED RELEASE ORAL ONCE
Status: COMPLETED | OUTPATIENT
Start: 2023-08-23 | End: 2023-08-23

## 2023-08-23 RX ORDER — MAGNESIUM SULFATE 1 G/100ML
1000 INJECTION INTRAVENOUS ONCE
Status: DISCONTINUED | OUTPATIENT
Start: 2023-08-23 | End: 2023-08-23

## 2023-08-23 RX ORDER — LANOLIN ALCOHOL/MO/W.PET/CERES
400 CREAM (GRAM) TOPICAL DAILY
Status: DISCONTINUED | OUTPATIENT
Start: 2023-08-23 | End: 2023-08-23 | Stop reason: HOSPADM

## 2023-08-23 RX ADMIN — POTASSIUM CHLORIDE 40 MEQ: 1500 TABLET, EXTENDED RELEASE ORAL at 10:46

## 2023-08-23 RX ADMIN — PANTOPRAZOLE SODIUM 40 MG: 40 TABLET, DELAYED RELEASE ORAL at 08:30

## 2023-08-23 RX ADMIN — ENOXAPARIN SODIUM 30 MG: 100 INJECTION SUBCUTANEOUS at 08:30

## 2023-08-23 RX ADMIN — FERROUS SULFATE TAB 325 MG (65 MG ELEMENTAL FE) 325 MG: 325 (65 FE) TAB at 08:30

## 2023-08-23 RX ADMIN — IPRATROPIUM BROMIDE AND ALBUTEROL SULFATE 1 DOSE: .5; 3 SOLUTION RESPIRATORY (INHALATION) at 07:41

## 2023-08-23 RX ADMIN — GUAIFENESIN 600 MG: 600 TABLET ORAL at 08:30

## 2023-08-23 RX ADMIN — ROFLUMILAST 500 MCG: 500 TABLET ORAL at 08:30

## 2023-08-23 RX ADMIN — BUSPIRONE HYDROCHLORIDE 5 MG: 5 TABLET ORAL at 08:32

## 2023-08-23 RX ADMIN — TORSEMIDE 20 MG: 20 TABLET ORAL at 08:29

## 2023-08-23 RX ADMIN — BUSPIRONE HYDROCHLORIDE 5 MG: 5 TABLET ORAL at 14:55

## 2023-08-23 RX ADMIN — ASPIRIN 81 MG 81 MG: 81 TABLET ORAL at 08:30

## 2023-08-23 RX ADMIN — IPRATROPIUM BROMIDE AND ALBUTEROL SULFATE 1 DOSE: .5; 3 SOLUTION RESPIRATORY (INHALATION) at 11:35

## 2023-08-23 RX ADMIN — Medication 400 MG: at 10:46

## 2023-08-23 RX ADMIN — SERTRALINE 50 MG: 50 TABLET, FILM COATED ORAL at 08:29

## 2023-08-23 NOTE — PLAN OF CARE
Problem: Respiratory - Adult  Goal: Achieves optimal ventilation and oxygenation  Outcome: Adequate for Discharge     Problem: Discharge Planning  Goal: Discharge to home or other facility with appropriate resources  Outcome: Adequate for Discharge     Problem: Pain  Goal: Verbalizes/displays adequate comfort level or baseline comfort level  Outcome: Adequate for Discharge     Problem: Safety - Adult  Goal: Free from fall injury  Outcome: Adequate for Discharge     Problem: Skin/Tissue Integrity  Goal: Absence of new skin breakdown  Description: 1. Monitor for areas of redness and/or skin breakdown  2. Assess vascular access sites hourly  3. Every 4-6 hours minimum:  Change oxygen saturation probe site  4. Every 4-6 hours:  If on nasal continuous positive airway pressure, respiratory therapy assess nares and determine need for appliance change or resting period.   Outcome: Adequate for Discharge     Problem: Chronic Conditions and Co-morbidities  Goal: Patient's chronic conditions and co-morbidity symptoms are monitored and maintained or improved  Outcome: Adequate for Discharge     Problem: Respiratory - Adult  Goal: Achieves optimal ventilation and oxygenation  Outcome: Adequate for Discharge     Problem: Cardiovascular - Adult  Goal: Maintains optimal cardiac output and hemodynamic stability  Outcome: Adequate for Discharge  Goal: Absence of cardiac dysrhythmias or at baseline  Outcome: Adequate for Discharge     Problem: Skin/Tissue Integrity - Adult  Goal: Skin integrity remains intact  Outcome: Adequate for Discharge     Problem: Infection - Adult  Goal: Absence of infection at discharge  Outcome: Adequate for Discharge     Problem: Nutrition Deficit:  Goal: Optimize nutritional status  Outcome: Adequate for Discharge     Problem: Genitourinary - Adult  Goal: Absence of urinary retention  Outcome: Adequate for Discharge

## 2023-08-23 NOTE — PROGRESS NOTES
Discharge: Pt discharged to [facility] as per order. IV removed. Scripts plus instructions given. Pt verbalized understanding. Denied questions. Report called to receiving nurse at [facility].

## 2023-08-23 NOTE — CARE COORDINATION
CASE MANAGEMENT DISCHARGE SUMMARY      Discharge to: [facility] Exemption Notification (HENS) completed: yes  [id number]    IMM given: 8/22/2023     Transportation:       Medical Transport explained to pt/family. Pt/family voice no agency preference.     Agency used: [facility] up time: 1430   Ambulance form completed: Yes    Confirmed discharge plan with:     Patient: yes     Family:  yes at the bedside   Facility/Agency, name:  [facility] faxed   Phone number for report to facility:      RN, name: Yenifer Betts RN

## 2023-08-23 NOTE — DISCHARGE SUMMARY
Hospital Medicine Discharge Summary    Patient: Dolores Otoole   : [birth year]     Admit Date: 2023   Discharge Date:   23  Disposition:  []Home   []HHC  [x]SNF  []Acute Rehab  []LTAC  []Hospice  Code status:  [x]Full  []DNR/CCA  []Limited (DNR/CCA with Do Not Intubate)  []DNRCC  Condition at Discharge: Stable  Primary Care Provider: Dariana Kenny MD    Admitting Provider: Radha Anderson MD  Discharge Provider: CLIFTON Alexander - NP     Discharge Diagnoses: Active Hospital Problems    Diagnosis     Hyponatremia [E87.1]     Stage 3a chronic kidney disease (HCC) [N18.31]     Dyspnea [R06.00]     COPD (chronic obstructive pulmonary disease) (HCC) [J44.9]     Elevated troponin [R77.8]      Presenting Admission History: This is a 81 y.o. female who presented to [facility] with shortness of breath. PMHx significant for Cardiomyopathy, Advanced COPD, CAD s/p CABG in , Depression/anxiety, HTN, HLD, Thyroid disease, and SDCHF. States symptoms ongoing the last 3 days shortness of breath increased congestion, symptoms not getting better now having palpitations. Denies chest pain or chest pressure. Does report fevers and chills. Reports lower quadrant abdominal pain, status post D&C on . Recent admission for anemia/bloody stools. In the ER  Elevated respiratory rate  Creatinine 1.3  Hemoglobin 9.7, increased from 2023   BNP 1024, decreased from 2023  Troponin 57 repeat 61  ABG ph 7.43, pCO2 41, PO2 109.4, HCO 27     Treated for COPD in the ER     Assessment/Plan:       Current Principal Problem:  COPD (chronic obstructive pulmonary disease) (HCC)     SOB, small exacerbation of Advanced COPD, improved  - Uses 2.5 L/NC at home. Not requiring increase to oxygen at this time.   - PCT 0.16  - Blood cultures NGTD   - Stopped antx therapy in the setting of negative CXR and PCT results  - Stop IV steroids, continue oral prednisone 40 mg day x 3 days,

## 2023-08-23 NOTE — PROGRESS NOTES
Occupational Therapy  Facility/Department: [facility] B3 - MED SURG  Daily Treatment Note  NAME: Светлана Guido  : 1940  MRN: [id number]    Date of Service: 2023    AM-PAC score  AM-PAC Inpatient Daily Activity Raw Score: 14 (23)  AM-PAC Inpatient ADL T-Scale Score : 33.39 (23)  ADL Inpatient CMS 0-100% Score: 59.67 (23)  ADL Inpatient CMS G-Code Modifier : CK (23)    Discharge Recommendations:  [address]  OT Equipment Recommendations  Equipment Needed: No  Other: defer to next level of care    Barriers to Home Discharge:   [] Steps to access home entry or bed/bath:   [x] Unable to transfer, ambulate, or propel wheelchair household distances without assist   [] Limited available assist at home upon discharge    [] Patient or family requests d/c to post-acute facility    [x] Poor cognition/safety awareness for d/c to home alone    [] Unable to maintain ordered weight bearing status    [] Patient with salient signs of long-standing immobility   [x] Decreased independence with ADLs   [x] Increased risk for falls   [] Other:     If pt is unable to be seen after this session, please let this note serve as discharge summary. Please see case management note for discharge disposition. Thank you. Patient Diagnosis(es): The primary encounter diagnosis was Dyspnea, unspecified type. Diagnoses of COPD exacerbation ([address]) and Elevated troponin were also pertinent to this visit. Assessment   Pt is dep LB dressing/toileting, mod I rolling in bed. Assist levels due to dec strength/endurance and to inc overall pt safety and ind with functional tasks. Pt completed BUE exercise to increase strength/ROM in prep to increase safety and ind w/ ADLs/transfers/ambulation.  Pt is currently functioning below baseline, will continue to benefit from skilled OT services in the acute care setting, and SNF is recommended at discharge to increase pt safety and ind with ADLs/transfers/ambulation. Activity Tolerance: Patient tolerated treatment well;Patient limited by endurance; Patient limited by fatigue  Discharge Recommendations: Subacute/Skilled Nursing Facility  Equipment Needed: No  Other: defer to next level of care      Plan  Occupational Therapy Plan  Times Per Week: 3-5x/week  Current Treatment Recommendations: Strengthening;ROM; Functional mobility training;Balance training;Neuromuscular re-education;Equipment evaluation, education, & procurement;Cognitive reorientation; Wheelchair mobility training;Self-Care / ADL;Cognitive/Perceptual training;Coordination training; Endurance training;Home management training;Positioning;Patient/Caregiver education & training;Gait training    Restrictions  General Precautions;Isolation; MDRO, tele; UPAT; 1500ml FR    Subjective  Subjective: pt in bed and agreeable to OT services upon arrival. Rn approved therapy. pt denies pain. Orientation  Overall Orientation Status: Within Normal Limits  Orientation Level: Oriented X4  Cognition  Overall Cognitive Status: WFL    Objective  Vitals  Pulse: 89  Heart Rate Source: Monitor  BP: 116/81  BP Location: Right upper arm  Patient Position: Semi fowlers  MAP (Calculated): 93  SpO2: 100 %  O2 Device: Nasal cannula  Comment: 2L O2    Bed Mobility Training  Rolling: Modified independent (during toileting/LB dressing, pt declined OOB activity)    ADL  LE Dressing: Dependent/Total  LE Dressing Skilled Clinical Factors: supine brief exchange  Toileting: Dependent/Total  Toileting Skilled Clinical Factors: bed pan  Additional Comments: pt declined further ADL needs    OT exercises: seated AROM x10 reps BUE therex  Digit flex/ext, wrist flex/ext, forearm pronation/supination, elbow flex/ext, shoulder flex/ext, shoulder int/ext rot, shoulder horizontal abd/add    Safety Devices  Type of Devices: All fall risk precautions in place; Bed alarm in place;Call light within reach; Heels elevated for pressure

## 2023-08-23 NOTE — CARE COORDINATION
[facility]  Patient is active with [facility], Will follow for discharge to home with orders to resume care.     Gracelyn Bloch RN, BSN CTN  [facility] [phone]

## 2023-08-30 ENCOUNTER — HOSPITAL ENCOUNTER (INPATIENT)
Age: 83
LOS: 2 days | Discharge: NURSING, REHAB OR LONG-TERM CARE | End: 2023-09-01
Attending: STUDENT IN AN ORGANIZED HEALTH CARE EDUCATION/TRAINING PROGRAM | Admitting: INTERNAL MEDICINE
Payer: MEDICARE

## 2023-08-30 ENCOUNTER — APPOINTMENT (OUTPATIENT)
Dept: CT IMAGING | Age: 83
End: 2023-08-30
Payer: MEDICARE

## 2023-08-30 DIAGNOSIS — K92.2 LOWER GI BLEED: Primary | ICD-10-CM

## 2023-08-30 DIAGNOSIS — D64.9 SYMPTOMATIC ANEMIA: ICD-10-CM

## 2023-08-30 PROBLEM — I50.32 CHRONIC DIASTOLIC HF (HEART FAILURE) (HCC): Status: ACTIVE | Noted: 2023-08-30

## 2023-08-30 PROBLEM — N18.32 STAGE 3B CHRONIC KIDNEY DISEASE (CKD) (HCC): Status: ACTIVE | Noted: 2023-08-30

## 2023-08-30 LAB
ABO + RH BLD: NORMAL
ALBUMIN SERPL-MCNC: 3.6 G/DL (ref 3.4–5)
ALBUMIN/GLOB SERPL: 1.2 {RATIO} (ref 1.1–2.2)
ALP SERPL-CCNC: 67 U/L (ref 40–129)
ALT SERPL-CCNC: 10 U/L (ref 10–40)
ANION GAP SERPL CALCULATED.3IONS-SCNC: 11 MMOL/L (ref 3–16)
AST SERPL-CCNC: 17 U/L (ref 15–37)
BACTERIA URNS QL MICRO: ABNORMAL /HPF
BASOPHILS # BLD: 0.1 K/UL (ref 0–0.2)
BASOPHILS NFR BLD: 0.6 %
BILIRUB SERPL-MCNC: <0.2 MG/DL (ref 0–1)
BILIRUB UR QL STRIP.AUTO: NEGATIVE
BLD GP AB SCN SERPL QL: NORMAL
BLOOD BANK DISPENSE STATUS: NORMAL
BLOOD BANK PRODUCT CODE: NORMAL
BPU ID: NORMAL
BUN SERPL-MCNC: 29 MG/DL (ref 7–20)
CALCIUM SERPL-MCNC: 9.4 MG/DL (ref 8.3–10.6)
CHLORIDE SERPL-SCNC: 97 MMOL/L (ref 99–110)
CLARITY UR: ABNORMAL
CO2 SERPL-SCNC: 26 MMOL/L (ref 21–32)
COLOR UR: YELLOW
CREAT SERPL-MCNC: 1.3 MG/DL (ref 0.6–1.2)
DEPRECATED RDW RBC AUTO: 14.3 % (ref 12.4–15.4)
DESCRIPTION BLOOD BANK: NORMAL
EKG ATRIAL RATE: 110 BPM
EKG DIAGNOSIS: NORMAL
EKG P AXIS: 86 DEGREES
EKG P-R INTERVAL: 160 MS
EKG Q-T INTERVAL: 312 MS
EKG QRS DURATION: 76 MS
EKG QTC CALCULATION (BAZETT): 422 MS
EKG R AXIS: -60 DEGREES
EKG T AXIS: 89 DEGREES
EKG VENTRICULAR RATE: 110 BPM
EOSINOPHIL # BLD: 0.2 K/UL (ref 0–0.6)
EOSINOPHIL NFR BLD: 2 %
EPI CELLS #/AREA URNS HPF: ABNORMAL /HPF (ref 0–5)
GFR SERPLBLD CREATININE-BSD FMLA CKD-EPI: 41 ML/MIN/{1.73_M2}
GLUCOSE BLD-MCNC: 105 MG/DL (ref 70–99)
GLUCOSE BLD-MCNC: 120 MG/DL (ref 70–99)
GLUCOSE SERPL-MCNC: 130 MG/DL (ref 70–99)
GLUCOSE UR STRIP.AUTO-MCNC: NEGATIVE MG/DL
HCT VFR BLD AUTO: 22.3 % (ref 36–48)
HCT VFR BLD AUTO: 22.6 % (ref 36–48)
HCT VFR BLD AUTO: 25.6 % (ref 36–48)
HEMOCCULT STL QL: ABNORMAL
HGB BLD-MCNC: 7.2 G/DL (ref 12–16)
HGB BLD-MCNC: 7.6 G/DL (ref 12–16)
HGB BLD-MCNC: 8.7 G/DL (ref 12–16)
HGB UR QL STRIP.AUTO: ABNORMAL
INR PPP: 1.95 (ref 0.84–1.16)
KETONES UR STRIP.AUTO-MCNC: NEGATIVE MG/DL
LEUKOCYTE ESTERASE UR QL STRIP.AUTO: ABNORMAL
LYMPHOCYTES # BLD: 2.3 K/UL (ref 1–5.1)
LYMPHOCYTES NFR BLD: 18.9 %
MCH RBC QN AUTO: 28.8 PG (ref 26–34)
MCHC RBC AUTO-ENTMCNC: 32 G/DL (ref 31–36)
MCV RBC AUTO: 89.9 FL (ref 80–100)
MONOCYTES # BLD: 0.8 K/UL (ref 0–1.3)
MONOCYTES NFR BLD: 6.7 %
MUCOUS THREADS #/AREA URNS LPF: ABNORMAL /LPF
NEUTROPHILS # BLD: 8.6 K/UL (ref 1.7–7.7)
NEUTROPHILS NFR BLD: 71.8 %
NITRITE UR QL STRIP.AUTO: NEGATIVE
PERFORMED ON: ABNORMAL
PERFORMED ON: ABNORMAL
PH UR STRIP.AUTO: 5.5 [PH] (ref 5–8)
PLATELET # BLD AUTO: 394 K/UL (ref 135–450)
PMV BLD AUTO: 7.4 FL (ref 5–10.5)
POTASSIUM SERPL-SCNC: 4.8 MMOL/L (ref 3.5–5.1)
PROT SERPL-MCNC: 6.7 G/DL (ref 6.4–8.2)
PROT UR STRIP.AUTO-MCNC: NEGATIVE MG/DL
PROTHROMBIN TIME: 22.2 SEC (ref 11.5–14.8)
RBC # BLD AUTO: 2.51 M/UL (ref 4–5.2)
RBC #/AREA URNS HPF: ABNORMAL /HPF (ref 0–4)
SODIUM SERPL-SCNC: 134 MMOL/L (ref 136–145)
SP GR UR STRIP.AUTO: <=1.005 (ref 1–1.03)
TROPONIN, HIGH SENSITIVITY: 54 NG/L (ref 0–14)
TROPONIN, HIGH SENSITIVITY: 68 NG/L (ref 0–14)
UA COMPLETE W REFLEX CULTURE PNL UR: YES
UA DIPSTICK W REFLEX MICRO PNL UR: YES
URN SPEC COLLECT METH UR: ABNORMAL
UROBILINOGEN UR STRIP-ACNC: 0.2 E.U./DL
WBC # BLD AUTO: 12 K/UL (ref 4–11)
WBC #/AREA URNS HPF: ABNORMAL /HPF (ref 0–5)

## 2023-08-30 PROCEDURE — 81001 URINALYSIS AUTO W/SCOPE: CPT

## 2023-08-30 PROCEDURE — 80053 COMPREHEN METABOLIC PANEL: CPT

## 2023-08-30 PROCEDURE — 85014 HEMATOCRIT: CPT

## 2023-08-30 PROCEDURE — 85025 COMPLETE CBC W/AUTO DIFF WBC: CPT

## 2023-08-30 PROCEDURE — 6360000004 HC RX CONTRAST MEDICATION: Performed by: STUDENT IN AN ORGANIZED HEALTH CARE EDUCATION/TRAINING PROGRAM

## 2023-08-30 PROCEDURE — 6360000002 HC RX W HCPCS: Performed by: STUDENT IN AN ORGANIZED HEALTH CARE EDUCATION/TRAINING PROGRAM

## 2023-08-30 PROCEDURE — 6370000000 HC RX 637 (ALT 250 FOR IP)

## 2023-08-30 PROCEDURE — 2700000000 HC OXYGEN THERAPY PER DAY

## 2023-08-30 PROCEDURE — 96374 THER/PROPH/DIAG INJ IV PUSH: CPT

## 2023-08-30 PROCEDURE — 74174 CTA ABD&PLVS W/CONTRAST: CPT

## 2023-08-30 PROCEDURE — 93005 ELECTROCARDIOGRAM TRACING: CPT | Performed by: STUDENT IN AN ORGANIZED HEALTH CARE EDUCATION/TRAINING PROGRAM

## 2023-08-30 PROCEDURE — 30233N1 TRANSFUSION OF NONAUTOLOGOUS RED BLOOD CELLS INTO PERIPHERAL VEIN, PERCUTANEOUS APPROACH: ICD-10-PCS | Performed by: INTERNAL MEDICINE

## 2023-08-30 PROCEDURE — 2580000003 HC RX 258

## 2023-08-30 PROCEDURE — 86901 BLOOD TYPING SEROLOGIC RH(D): CPT

## 2023-08-30 PROCEDURE — 85018 HEMOGLOBIN: CPT

## 2023-08-30 PROCEDURE — 87086 URINE CULTURE/COLONY COUNT: CPT

## 2023-08-30 PROCEDURE — 36430 TRANSFUSION BLD/BLD COMPNT: CPT

## 2023-08-30 PROCEDURE — 99285 EMERGENCY DEPT VISIT HI MDM: CPT

## 2023-08-30 PROCEDURE — 93010 ELECTROCARDIOGRAM REPORT: CPT | Performed by: INTERNAL MEDICINE

## 2023-08-30 PROCEDURE — 94761 N-INVAS EAR/PLS OXIMETRY MLT: CPT

## 2023-08-30 PROCEDURE — 85610 PROTHROMBIN TIME: CPT

## 2023-08-30 PROCEDURE — 82270 OCCULT BLOOD FECES: CPT

## 2023-08-30 PROCEDURE — 84484 ASSAY OF TROPONIN QUANT: CPT

## 2023-08-30 PROCEDURE — 36415 COLL VENOUS BLD VENIPUNCTURE: CPT

## 2023-08-30 PROCEDURE — 94640 AIRWAY INHALATION TREATMENT: CPT

## 2023-08-30 PROCEDURE — P9016 RBC LEUKOCYTES REDUCED: HCPCS

## 2023-08-30 PROCEDURE — 2060000000 HC ICU INTERMEDIATE R&B

## 2023-08-30 PROCEDURE — 86900 BLOOD TYPING SEROLOGIC ABO: CPT

## 2023-08-30 PROCEDURE — 86850 RBC ANTIBODY SCREEN: CPT

## 2023-08-30 PROCEDURE — 6360000002 HC RX W HCPCS

## 2023-08-30 PROCEDURE — 86923 COMPATIBILITY TEST ELECTRIC: CPT

## 2023-08-30 PROCEDURE — 99222 1ST HOSP IP/OBS MODERATE 55: CPT

## 2023-08-30 RX ORDER — BUSPIRONE HYDROCHLORIDE 5 MG/1
5 TABLET ORAL EVERY 8 HOURS PRN
COMMUNITY

## 2023-08-30 RX ORDER — BISACODYL 5 MG/1
20 TABLET, DELAYED RELEASE ORAL ONCE
Status: COMPLETED | OUTPATIENT
Start: 2023-08-30 | End: 2023-08-30

## 2023-08-30 RX ORDER — ATORVASTATIN CALCIUM 40 MG/1
40 TABLET, FILM COATED ORAL NIGHTLY
Status: DISCONTINUED | OUTPATIENT
Start: 2023-08-30 | End: 2023-09-01 | Stop reason: HOSPADM

## 2023-08-30 RX ORDER — TORSEMIDE 20 MG/1
20 TABLET ORAL DAILY
Status: DISCONTINUED | OUTPATIENT
Start: 2023-08-30 | End: 2023-09-01 | Stop reason: HOSPADM

## 2023-08-30 RX ORDER — SODIUM CHLORIDE 9 MG/ML
INJECTION, SOLUTION INTRAVENOUS CONTINUOUS
Status: DISCONTINUED | OUTPATIENT
Start: 2023-08-30 | End: 2023-08-31

## 2023-08-30 RX ORDER — SODIUM CHLORIDE 9 MG/ML
INJECTION, SOLUTION INTRAVENOUS PRN
Status: DISCONTINUED | OUTPATIENT
Start: 2023-08-30 | End: 2023-09-01 | Stop reason: HOSPADM

## 2023-08-30 RX ORDER — ONDANSETRON 2 MG/ML
4 INJECTION INTRAMUSCULAR; INTRAVENOUS ONCE
Status: COMPLETED | OUTPATIENT
Start: 2023-08-30 | End: 2023-08-30

## 2023-08-30 RX ORDER — ASPIRIN 81 MG/1
81 TABLET, CHEWABLE ORAL DAILY
Status: DISCONTINUED | OUTPATIENT
Start: 2023-08-30 | End: 2023-09-01 | Stop reason: HOSPADM

## 2023-08-30 RX ORDER — ONDANSETRON 2 MG/ML
4 INJECTION INTRAMUSCULAR; INTRAVENOUS EVERY 6 HOURS PRN
Status: DISCONTINUED | OUTPATIENT
Start: 2023-08-30 | End: 2023-09-01 | Stop reason: HOSPADM

## 2023-08-30 RX ORDER — DEXTROMETHORPHAN HYDROBROMIDE AND PROMETHAZINE HYDROCHLORIDE 15; 6.25 MG/5ML; MG/5ML
SYRUP ORAL EVERY 4 HOURS PRN
COMMUNITY

## 2023-08-30 RX ORDER — ROFLUMILAST 500 UG/1
500 TABLET ORAL DAILY
Status: DISCONTINUED | OUTPATIENT
Start: 2023-08-30 | End: 2023-09-01 | Stop reason: HOSPADM

## 2023-08-30 RX ORDER — SODIUM CHLORIDE 0.9 % (FLUSH) 0.9 %
5-40 SYRINGE (ML) INJECTION PRN
Status: DISCONTINUED | OUTPATIENT
Start: 2023-08-30 | End: 2023-09-01 | Stop reason: HOSPADM

## 2023-08-30 RX ORDER — ONDANSETRON 4 MG/1
4 TABLET, ORALLY DISINTEGRATING ORAL EVERY 8 HOURS PRN
Status: DISCONTINUED | OUTPATIENT
Start: 2023-08-30 | End: 2023-09-01 | Stop reason: HOSPADM

## 2023-08-30 RX ORDER — ALBUTEROL SULFATE 2.5 MG/3ML
2.5 SOLUTION RESPIRATORY (INHALATION)
Status: DISCONTINUED | OUTPATIENT
Start: 2023-08-30 | End: 2023-09-01 | Stop reason: HOSPADM

## 2023-08-30 RX ORDER — ATORVASTATIN CALCIUM 40 MG/1
40 TABLET, FILM COATED ORAL NIGHTLY
COMMUNITY

## 2023-08-30 RX ORDER — FLUTICASONE PROPIONATE 50 MCG
1 SPRAY, SUSPENSION (ML) NASAL DAILY PRN
Status: DISCONTINUED | OUTPATIENT
Start: 2023-08-30 | End: 2023-09-01 | Stop reason: HOSPADM

## 2023-08-30 RX ORDER — BUDESONIDE AND FORMOTEROL FUMARATE DIHYDRATE 160; 4.5 UG/1; UG/1
2 AEROSOL RESPIRATORY (INHALATION)
Status: DISCONTINUED | OUTPATIENT
Start: 2023-08-30 | End: 2023-09-01 | Stop reason: HOSPADM

## 2023-08-30 RX ORDER — SODIUM CHLORIDE 0.9 % (FLUSH) 0.9 %
5-40 SYRINGE (ML) INJECTION EVERY 12 HOURS SCHEDULED
Status: DISCONTINUED | OUTPATIENT
Start: 2023-08-30 | End: 2023-09-01 | Stop reason: HOSPADM

## 2023-08-30 RX ORDER — ALBUTEROL SULFATE 2.5 MG/3ML
2.5 SOLUTION RESPIRATORY (INHALATION) EVERY 4 HOURS PRN
Status: DISCONTINUED | OUTPATIENT
Start: 2023-08-30 | End: 2023-09-01 | Stop reason: HOSPADM

## 2023-08-30 RX ORDER — PANTOPRAZOLE SODIUM 40 MG/1
40 TABLET, DELAYED RELEASE ORAL DAILY
Status: DISCONTINUED | OUTPATIENT
Start: 2023-08-30 | End: 2023-09-01 | Stop reason: HOSPADM

## 2023-08-30 RX ORDER — BUSPIRONE HYDROCHLORIDE 5 MG/1
5 TABLET ORAL EVERY 8 HOURS PRN
Status: DISCONTINUED | OUTPATIENT
Start: 2023-08-30 | End: 2023-09-01 | Stop reason: HOSPADM

## 2023-08-30 RX ORDER — FERROUS SULFATE 325(65) MG
325 TABLET ORAL
Status: DISCONTINUED | OUTPATIENT
Start: 2023-08-31 | End: 2023-09-01 | Stop reason: HOSPADM

## 2023-08-30 RX ORDER — ACETAMINOPHEN 650 MG/1
650 SUPPOSITORY RECTAL EVERY 6 HOURS PRN
Status: DISCONTINUED | OUTPATIENT
Start: 2023-08-30 | End: 2023-09-01 | Stop reason: HOSPADM

## 2023-08-30 RX ORDER — GUAIFENESIN 600 MG/1
600 TABLET, EXTENDED RELEASE ORAL DAILY
Status: DISCONTINUED | OUTPATIENT
Start: 2023-08-30 | End: 2023-09-01 | Stop reason: HOSPADM

## 2023-08-30 RX ORDER — ACETAMINOPHEN 325 MG/1
650 TABLET ORAL EVERY 6 HOURS PRN
Status: DISCONTINUED | OUTPATIENT
Start: 2023-08-30 | End: 2023-09-01 | Stop reason: HOSPADM

## 2023-08-30 RX ORDER — MECOBALAMIN 5000 MCG
5 TABLET,DISINTEGRATING ORAL NIGHTLY
Status: DISCONTINUED | OUTPATIENT
Start: 2023-08-30 | End: 2023-09-01 | Stop reason: HOSPADM

## 2023-08-30 RX ORDER — SODIUM CHLORIDE 9 MG/ML
INJECTION, SOLUTION INTRAVENOUS PRN
Status: DISCONTINUED | OUTPATIENT
Start: 2023-08-30 | End: 2023-08-31

## 2023-08-30 RX ORDER — METOPROLOL SUCCINATE 50 MG/1
50 TABLET, EXTENDED RELEASE ORAL
Status: DISCONTINUED | OUTPATIENT
Start: 2023-08-30 | End: 2023-09-01 | Stop reason: HOSPADM

## 2023-08-30 RX ADMIN — SODIUM CHLORIDE: 9 INJECTION, SOLUTION INTRAVENOUS at 16:34

## 2023-08-30 RX ADMIN — IOPAMIDOL 85 ML: 755 INJECTION, SOLUTION INTRAVENOUS at 09:10

## 2023-08-30 RX ADMIN — Medication 5 MG: at 20:41

## 2023-08-30 RX ADMIN — ROFLUMILAST 500 MCG: 500 TABLET ORAL at 17:47

## 2023-08-30 RX ADMIN — SODIUM CHLORIDE, PRESERVATIVE FREE 10 ML: 5 INJECTION INTRAVENOUS at 20:53

## 2023-08-30 RX ADMIN — BISACODYL 20 MG: 5 TABLET, COATED ORAL at 18:28

## 2023-08-30 RX ADMIN — ONDANSETRON 4 MG: 2 INJECTION INTRAMUSCULAR; INTRAVENOUS at 10:18

## 2023-08-30 RX ADMIN — PANTOPRAZOLE SODIUM 40 MG: 40 TABLET, DELAYED RELEASE ORAL at 17:48

## 2023-08-30 RX ADMIN — METOPROLOL SUCCINATE 50 MG: 50 TABLET, EXTENDED RELEASE ORAL at 20:41

## 2023-08-30 RX ADMIN — SERTRALINE HYDROCHLORIDE 50 MG: 50 TABLET ORAL at 17:47

## 2023-08-30 RX ADMIN — ACETAMINOPHEN 650 MG: 325 TABLET ORAL at 18:27

## 2023-08-30 RX ADMIN — ATORVASTATIN CALCIUM 40 MG: 40 TABLET, FILM COATED ORAL at 20:41

## 2023-08-30 RX ADMIN — ALBUTEROL SULFATE 2.5 MG: 2.5 SOLUTION RESPIRATORY (INHALATION) at 20:16

## 2023-08-30 RX ADMIN — Medication 2 PUFF: at 20:16

## 2023-08-30 RX ADMIN — GUAIFENESIN 600 MG: 600 TABLET, EXTENDED RELEASE ORAL at 17:47

## 2023-08-30 ASSESSMENT — LIFESTYLE VARIABLES: HOW OFTEN DO YOU HAVE A DRINK CONTAINING ALCOHOL: NEVER

## 2023-08-30 ASSESSMENT — PAIN DESCRIPTION - LOCATION: LOCATION: HEAD

## 2023-08-30 ASSESSMENT — PAIN SCALES - GENERAL
PAINLEVEL_OUTOF10: 0
PAINLEVEL_OUTOF10: 2
PAINLEVEL_OUTOF10: 6

## 2023-08-30 ASSESSMENT — PAIN DESCRIPTION - DESCRIPTORS: DESCRIPTORS: ACHING

## 2023-08-30 ASSESSMENT — PAIN - FUNCTIONAL ASSESSMENT: PAIN_FUNCTIONAL_ASSESSMENT: NONE - DENIES PAIN

## 2023-08-30 NOTE — PROGRESS NOTES
Pharmacy Medication Reconciliation     Home medication list updated by reviewing nursing home order summary report    Allergy Update: Latex and Codeine    Recommendations/Findings: The following amendments were made to the patient's active medication list on file:   1) Additions:   Promethazine-DM 6.25-15mg/5mL    2) Deletions:  N/A    3) Changes:  Atorvastatin 40 mg daily (changed from 60 mg)  Buspirone 5 mg every 8 hours PRN anxiety (changed from TID)      PTA medication list was corrected to the following:     [unfilled]     Thank you,  González Palomino, Pharm. D., [facility]  [phone]  8/30/2023 8:57 AM

## 2023-08-30 NOTE — H&P
08:38   Troponin, High Sensitivity 0 - 14 ng/L 54 (H)   (H): Data is abnormally high      CULTURES  UA with reflex to culture pending     EKG:      Sinus tachycardia   Premature atrial complexes  Left axis deviation  Septal infarct (cited on or before 07-MAY-2023)  Abnormal ECG  When compared with ECG of 20-AUG-2023 07:23,Inverted T waves have replaced nonspecific T wave abnormality in Lateral leads  Premature atrial beats are more in frequency. Confirmed by Carolina Treadwell MD, [facility] on 8/30/2023 11:43:02 AM     RADIOLOGY  CTA ABDOMEN PELVIS W WO CONTRAST   Final Result   Small amount of hemorrhage seen posteriorly in the rectum with asymmetric   thickening of the rectum. This may reflect hemorrhoids versus a rectal mass. Recommend further evaluation with colonoscopy. Colonoscopy 8/7/23    1. Two 5 mm polyps in ascending/transv colon removed w cold-snare  2. Mall hemorrhoids          Principal Problem:    GI bleed  Active Problems:    Hyperlipidemia    Elevated troponin    CAD (coronary artery disease)    CAD in native artery    S/P CABG x 2    Chronic obstructive pulmonary disease (HCC)    PAF (paroxysmal atrial fibrillation) (HCC)    Chronic hypoxemic respiratory failure (HCC)    Stage 3b chronic kidney disease (CKD) (HCC)    Chronic diastolic HF (heart failure) ([address])  Resolved Problems:    * No resolved hospital problems.  *        ASSESSMENT/PLAN:    #Acute lower GIB   #Hemorrhoids vs rectal mass   - hemoccult +  - CTA abdomen pelvis as above  - recent Colonoscopy 8/7 with polyp removal, hemorrhoids   - Hgb 7.2, transfusing 1 unit PRBCs in ED  - baseline hgb ~ 8-9  - trend and transfuse to maintain Hgb > 7  - Xarelto held   - IVFs  - PPI  - NPO  - GI consult     #Elevated troponin   - 54, repeat pending   - appears chronic elevation per review  - denies chest pain  - EKG without acute ischemic changes  - monitor on tele     #HTN  - continue Toprol XL, Demadex held 2/2 CKD after receiving contrast  - resume as able   - BPs stable  - monitor     #CAD s/p CABG 2019  - continue statin, asa held 2/2 above- resume as able     #HLD  - continue statin     #Chronic diastolic CHF  - appears compensated  - last Echo 5/8/23 EF 50-55%  - continue Toprol XL    #PAF  - Xarelto held 2/2 above, resume as able   - continue Toprol XL    #COPD without AE  #Chronic hypoxic respiratory failure   - 2-3 L baseline, stable   - continue inhalers and Daliresp     #CKD stage 3b  - creatinine at baseline  - received contrast with CTA   - avoid further nephrotoxic agents as able   - IVFs  - monitor     #Depression  - continue home regimen         DVT Prophylaxis: SCDs  Diet: NPO  Code Status: [facility] [place] - CNP  08/30/23  11:31 AM

## 2023-08-30 NOTE — ED PROVIDER NOTES
[address] [address] ED     EMERGENCY DEPARTMENT ENCOUNTER            Pt Name: Adrien Reyes   MRN: [id number]   [address] [facility] 1940   Date of evaluation: 8/30/2023   Provider: Soham Reid MD   PCP: Constance Yen MD   Note Started: 10:50 AM EDT 8/30/23          CHIEF COMPLAINT     Chief Complaint   Patient presents with    GI Problem     Pt arrives from [facility] for reports of rectal bleeding and fatigue. HISTORY OF PRESENT ILLNESS:   History from : Patient and EMS   Limitations to history : None     Adrien Reyes is a 81 y.o. female who presents complaining of rectal bleeding and fatigue. Patient was sent over from the [facility] with reports of bright red blood per rectum. Patient denies any abdominal pain but states that she has been fatigued. She is anticoagulated on Xarelto. Patient denies any other complaints or concerns. Was recently hospitalized for GI bleed. Nursing Notes were all reviewed and agreed with, or any disagreements were addressed in the HPI. REVIEW OF SYSTEMS :    Positives and Pertinent negatives as per HPI. MEDICAL HISTORY   has a past medical history of NADJA (acute kidney injury) ([address]), Asthma, CAD (coronary artery disease), CHF (congestive heart failure) ([address]), Chronic anxiety, COPD (chronic obstructive pulmonary disease) ([address]), GERD (gastroesophageal reflux disease) (09/09/2021), Heart attack ([address]) (2019), History of blood transfusion, Hyperlipidemia, Hypertension, MRSA (methicillin resistant staph aureus) culture positive (09/22/2019), OA (osteoarthritis) (08/12/2014), and Thyroid disease.     Past Surgical History:   Procedure Laterality Date    BRONCHOSCOPY  09/08/2019    Dr. Zia Yoder - w/BAL    CARDIAC CATHETERIZATION  09/05/2019    Dr. Mary Wild 2/24/2020    COLONOSCOPY DIAGNOSTIC performed by Chely Monson DO at [facility] N/A 10/22/2021    COLONOSCOPY POLYPECTOMY SNARE/COLD

## 2023-08-30 NOTE — ACP (ADVANCE CARE PLANNING)
Advance Care Planning     General Advance Care Planning (ACP) Conversation    Date of Conversation: 8/30/2023  Conducted with: Patient with Decision Making Capacity    Healthcare Decision Maker:    Primary Decision Maker: Tanya Slaughter Child - [phone]    Secondary Decision Maker: Ricky Ryan - Niece/Nephew - [phone]  Click here to complete Healthcare Decision Makers including selection of the Healthcare Decision Maker Relationship (ie \"Primary\"). Today we documented Decision Maker(s) consistent with Legal Next of Kin hierarchy.     Content/Action Overview:  DECLINED ACP Conversation - will revisit periodically  Reviewed DNR/DNI and patient elects Full Code (Attempt Resuscitation)        Length of Voluntary ACP Conversation in minutes:  <16 minutes (Non-Billable)    Kimberly Mary RN

## 2023-08-30 NOTE — PROGRESS NOTES
Consult has been called to Dr. Sridevi Carias on 8/30/23.  Spoke with Susannah PERDUE 2:13 PM    Paul Combs  8/30/2023

## 2023-08-30 NOTE — PLAN OF CARE
Lower GI bleed on Xarelto for PAF  CTA show active hemorrhage in rectum, mass vs hemorrhoids  Likely hemorrhoids as patient had colonoscopy with polyp removal on 8/7  Hgb 7.2, transfusing unit in ED  GI consult    Viola Perez PA-C  8/30/2023   10:49 AM

## 2023-08-30 NOTE — PROGRESS NOTES
Patient verbalizing complaints of headache. Medicated w/ tylenol grX po  -  also aware to be having colonoscopy tomorrow. Medicated w/ 4 dulcolax tablets by mouth.

## 2023-08-30 NOTE — PROGRESS NOTES
Patient admitted to room 319 from ED. Patient oriented to room, call light, bed rails, phone, lights and bathroom. Patient instructed about the schedule of the day including: vital sign frequency, lab draws, possible tests, frequency of MD and staff rounds, daily weights, I &O's and prescribed diet. #22 Telemetry box in place, patient aware of placement and reason. Bed locked, in lowest position, side rails up 2/4, call light within reach. Unit #1 PRBC inifusing via #22 Right FA. Denying complaints of pain or discomfort other than occasional pain to right forearm IV site. No redness/edema noted. Recliner Assessment  Patient is not able to demonstrate the ability to move from a reclining position to an upright position within the recliner due to increased weakness r/t diagnosis. 4 Eyes Skin Assessment     NAME:  Angelo Smith  YOB: 1940  MEDICAL RECORD NUMBER:  [id number]    The patient is being assessed for  Admission    I agree that at least one RN has performed a thorough Head to Toe Skin Assessment on the patient. ALL assessment sites listed below have been assessed. Areas assessed by both nurses:    Head, Face, Ears, Shoulders, Back, Chest, Arms, Elbows, Hands, Sacrum. Buttock, Coccyx, Ischium, Legs. Feet and Heels, and Under Medical Devices         Does the Patient have a Wound?  No noted wound(s)  -  only scattered bruising and skin discoloration to BLE       Trey Prevention initiated by RN: Yes  Wound Care Orders initiated by RN: No    Pressure Injury (Stage 3,4, Unstageable, DTI, NWPT, and Complex wounds) if present, place Wound referral order by RN under : No    New Ostomies, if present place, Ostomy referral order under : No     Nurse 1 eSignature: Electronically signed by Divine Hi RN on 8/30/23 at 2:19 PM EDT    **SHARE this note so that the co-signing nurse can place an eSignature**    Nurse 2 eSignature: Electronically signed by

## 2023-08-31 LAB
ANION GAP SERPL CALCULATED.3IONS-SCNC: 8 MMOL/L (ref 3–16)
APTT BLD: 34.5 SEC (ref 22.7–35.9)
BACTERIA UR CULT: NORMAL
BUN SERPL-MCNC: 26 MG/DL (ref 7–20)
CALCIUM SERPL-MCNC: 8.6 MG/DL (ref 8.3–10.6)
CHLORIDE SERPL-SCNC: 105 MMOL/L (ref 99–110)
CO2 SERPL-SCNC: 24 MMOL/L (ref 21–32)
CREAT SERPL-MCNC: 1.1 MG/DL (ref 0.6–1.2)
GFR SERPLBLD CREATININE-BSD FMLA CKD-EPI: 50 ML/MIN/{1.73_M2}
GLUCOSE BLD-MCNC: 106 MG/DL (ref 70–99)
GLUCOSE BLD-MCNC: 107 MG/DL (ref 70–99)
GLUCOSE BLD-MCNC: 122 MG/DL (ref 70–99)
GLUCOSE BLD-MCNC: 86 MG/DL (ref 70–99)
GLUCOSE SERPL-MCNC: 109 MG/DL (ref 70–99)
HCT VFR BLD AUTO: 20.1 % (ref 36–48)
HCT VFR BLD AUTO: 21.6 % (ref 36–48)
HCT VFR BLD AUTO: 23.1 % (ref 36–48)
HGB BLD-MCNC: 6.7 G/DL (ref 12–16)
HGB BLD-MCNC: 7.2 G/DL (ref 12–16)
HGB BLD-MCNC: 7.7 G/DL (ref 12–16)
INR PPP: 1.27 (ref 0.84–1.16)
IRON SATN MFR SERPL: 30 % (ref 15–50)
IRON SERPL-MCNC: 65 UG/DL (ref 37–145)
PERFORMED ON: ABNORMAL
PERFORMED ON: NORMAL
POTASSIUM SERPL-SCNC: 5 MMOL/L (ref 3.5–5.1)
PROTHROMBIN TIME: 15.9 SEC (ref 11.5–14.8)
SODIUM SERPL-SCNC: 137 MMOL/L (ref 136–145)
TIBC SERPL-MCNC: 220 UG/DL (ref 260–445)

## 2023-08-31 PROCEDURE — 0DJD8ZZ INSPECTION OF LOWER INTESTINAL TRACT, VIA NATURAL OR ARTIFICIAL OPENING ENDOSCOPIC: ICD-10-PCS | Performed by: INTERNAL MEDICINE

## 2023-08-31 PROCEDURE — 6360000002 HC RX W HCPCS: Performed by: INTERNAL MEDICINE

## 2023-08-31 PROCEDURE — 2709999900 HC NON-CHARGEABLE SUPPLY: Performed by: INTERNAL MEDICINE

## 2023-08-31 PROCEDURE — 6370000000 HC RX 637 (ALT 250 FOR IP)

## 2023-08-31 PROCEDURE — 7100000010 HC PHASE II RECOVERY - FIRST 15 MIN: Performed by: INTERNAL MEDICINE

## 2023-08-31 PROCEDURE — 2700000000 HC OXYGEN THERAPY PER DAY

## 2023-08-31 PROCEDURE — 6370000000 HC RX 637 (ALT 250 FOR IP): Performed by: INTERNAL MEDICINE

## 2023-08-31 PROCEDURE — 3609008400 HC SIGMOIDOSCOPY DIAGNOSTIC: Performed by: INTERNAL MEDICINE

## 2023-08-31 PROCEDURE — 85730 THROMBOPLASTIN TIME PARTIAL: CPT

## 2023-08-31 PROCEDURE — 83540 ASSAY OF IRON: CPT

## 2023-08-31 PROCEDURE — 94761 N-INVAS EAR/PLS OXIMETRY MLT: CPT

## 2023-08-31 PROCEDURE — 99232 SBSQ HOSP IP/OBS MODERATE 35: CPT | Performed by: INTERNAL MEDICINE

## 2023-08-31 PROCEDURE — 80048 BASIC METABOLIC PNL TOTAL CA: CPT

## 2023-08-31 PROCEDURE — 94640 AIRWAY INHALATION TREATMENT: CPT

## 2023-08-31 PROCEDURE — 85014 HEMATOCRIT: CPT

## 2023-08-31 PROCEDURE — 2580000003 HC RX 258

## 2023-08-31 PROCEDURE — 83550 IRON BINDING TEST: CPT

## 2023-08-31 PROCEDURE — 36415 COLL VENOUS BLD VENIPUNCTURE: CPT

## 2023-08-31 PROCEDURE — 99152 MOD SED SAME PHYS/QHP 5/>YRS: CPT | Performed by: INTERNAL MEDICINE

## 2023-08-31 PROCEDURE — 7100000011 HC PHASE II RECOVERY - ADDTL 15 MIN: Performed by: INTERNAL MEDICINE

## 2023-08-31 PROCEDURE — 85018 HEMOGLOBIN: CPT

## 2023-08-31 PROCEDURE — 85610 PROTHROMBIN TIME: CPT

## 2023-08-31 PROCEDURE — 2060000000 HC ICU INTERMEDIATE R&B

## 2023-08-31 RX ORDER — LACTOBACILLUS RHAMNOSUS GG 10B CELL
1 CAPSULE ORAL
Status: DISCONTINUED | OUTPATIENT
Start: 2023-09-01 | End: 2023-09-01 | Stop reason: HOSPADM

## 2023-08-31 RX ORDER — SODIUM CHLORIDE 9 MG/ML
INJECTION, SOLUTION INTRAVENOUS PRN
Status: DISCONTINUED | OUTPATIENT
Start: 2023-08-31 | End: 2023-09-01 | Stop reason: HOSPADM

## 2023-08-31 RX ORDER — HYDROCORTISONE ACETATE 25 MG/1
25 SUPPOSITORY RECTAL 2 TIMES DAILY
Status: DISCONTINUED | OUTPATIENT
Start: 2023-08-31 | End: 2023-09-01 | Stop reason: HOSPADM

## 2023-08-31 RX ORDER — MIDAZOLAM HYDROCHLORIDE 5 MG/ML
INJECTION INTRAMUSCULAR; INTRAVENOUS PRN
Status: DISCONTINUED | OUTPATIENT
Start: 2023-08-31 | End: 2023-08-31 | Stop reason: ALTCHOICE

## 2023-08-31 RX ORDER — FENTANYL CITRATE 50 UG/ML
INJECTION, SOLUTION INTRAMUSCULAR; INTRAVENOUS PRN
Status: DISCONTINUED | OUTPATIENT
Start: 2023-08-31 | End: 2023-08-31 | Stop reason: ALTCHOICE

## 2023-08-31 RX ADMIN — SERTRALINE HYDROCHLORIDE 50 MG: 50 TABLET ORAL at 10:36

## 2023-08-31 RX ADMIN — HYDROCORTISONE ACETATE 25 MG: 25 SUPPOSITORY RECTAL at 14:01

## 2023-08-31 RX ADMIN — Medication 2 PUFF: at 07:58

## 2023-08-31 RX ADMIN — ALBUTEROL SULFATE 2.5 MG: 2.5 SOLUTION RESPIRATORY (INHALATION) at 19:46

## 2023-08-31 RX ADMIN — ATORVASTATIN CALCIUM 40 MG: 40 TABLET, FILM COATED ORAL at 20:45

## 2023-08-31 RX ADMIN — SODIUM CHLORIDE: 9 INJECTION, SOLUTION INTRAVENOUS at 13:59

## 2023-08-31 RX ADMIN — SODIUM CHLORIDE, PRESERVATIVE FREE 10 ML: 5 INJECTION INTRAVENOUS at 20:46

## 2023-08-31 RX ADMIN — SODIUM CHLORIDE, PRESERVATIVE FREE 10 ML: 5 INJECTION INTRAVENOUS at 13:48

## 2023-08-31 RX ADMIN — Medication 5 MG: at 20:45

## 2023-08-31 RX ADMIN — Medication 2 PUFF: at 19:46

## 2023-08-31 RX ADMIN — ROFLUMILAST 500 MCG: 500 TABLET ORAL at 13:47

## 2023-08-31 RX ADMIN — FERROUS SULFATE TAB 325 MG (65 MG ELEMENTAL FE) 325 MG: 325 (65 FE) TAB at 13:48

## 2023-08-31 RX ADMIN — ALBUTEROL SULFATE 2.5 MG: 2.5 SOLUTION RESPIRATORY (INHALATION) at 07:58

## 2023-08-31 RX ADMIN — HYDROCORTISONE ACETATE 25 MG: 25 SUPPOSITORY RECTAL at 20:45

## 2023-08-31 RX ADMIN — PSYLLIUM HUSK 1 PACKET: 3.4 POWDER ORAL at 15:04

## 2023-08-31 RX ADMIN — GUAIFENESIN 600 MG: 600 TABLET, EXTENDED RELEASE ORAL at 13:48

## 2023-08-31 ASSESSMENT — PAIN DESCRIPTION - LOCATION
LOCATION: OTHER (COMMENT)

## 2023-08-31 ASSESSMENT — PAIN DESCRIPTION - ORIENTATION
ORIENTATION: OTHER (COMMENT)

## 2023-08-31 ASSESSMENT — PAIN DESCRIPTION - FREQUENCY
FREQUENCY: OTHER (COMMENT)

## 2023-08-31 ASSESSMENT — PAIN DESCRIPTION - ONSET
ONSET: OTHER (COMMENT)

## 2023-08-31 ASSESSMENT — PAIN DESCRIPTION - PAIN TYPE
TYPE: OTHER (COMMENT)

## 2023-08-31 ASSESSMENT — PAIN SCALES - GENERAL
PAINLEVEL_OUTOF10: 0

## 2023-08-31 ASSESSMENT — PAIN DESCRIPTION - DESCRIPTORS
DESCRIPTORS: OTHER (COMMENT)

## 2023-08-31 ASSESSMENT — PAIN - FUNCTIONAL ASSESSMENT: PAIN_FUNCTIONAL_ASSESSMENT: 0-10

## 2023-08-31 NOTE — PROGRESS NOTES
CMU called an patient is visible on monitor. Report called to floor by Huyen Mathis (endo nurse). Patient a/o x4, denies pain, on 3 L O2. Patient skin is fragile and bruising is everywhere. Vital signs stable at time of transfer.

## 2023-08-31 NOTE — PROGRESS NOTES
Repeat Hgb resulted at 7.7 Perfect serve sent to Dr. Josh Kennedy to verify she still wanted blood transfusion given. She gave verbal order to cancel blood transfusion at this time.

## 2023-08-31 NOTE — PLAN OF CARE
Problem: Chronic Conditions and Co-morbidities  Goal: Patient's chronic conditions and co-morbidity symptoms are monitored and maintained or improved  8/31/2023 0248 by Sonia Sellers RN  Outcome: Progressing  8/31/2023 0248 by Sonia Sellers RN  Outcome: Progressing     Problem: Discharge Planning  Goal: Discharge to home or other facility with appropriate resources  8/31/2023 0248 by Sonia Sellers RN  Outcome: Progressing  8/31/2023 0248 by Sonia Sellers RN  Outcome: Progressing     Problem: Safety - Adult  Goal: Free from fall injury  Outcome: Progressing     Problem: ABCDS Injury Assessment  Goal: Absence of physical injury  8/31/2023 0248 by Sonia Sellers RN  Outcome: Progressing  8/31/2023 0248 by Sonia Sellers RN  Outcome: Progressing  Flowsheets (Taken 8/30/2023 1434 by Galen Madrid RN)  Absence of Physical Injury: Implement safety measures based on patient assessment     Problem: Skin/Tissue Integrity  Goal: Absence of new skin breakdown  Description: 1. Monitor for areas of redness and/or skin breakdown  2. Assess vascular access sites hourly  3. Every 4-6 hours minimum:  Change oxygen saturation probe site  4. Every 4-6 hours:  If on nasal continuous positive airway pressure, respiratory therapy assess nares and determine need for appliance change or resting period.   8/31/2023 0248 by Sonia Sellers RN  Outcome: Progressing  8/31/2023 0248 by Sonia Sellers RN  Outcome: Progressing

## 2023-08-31 NOTE — PLAN OF CARE
Problem: Chronic Conditions and Co-morbidities  Goal: Patient's chronic conditions and co-morbidity symptoms are monitored and maintained or improved  8/31/2023 0744 by Rosalio Hopper RN  Outcome: Progressing  8/31/2023 0248 by Natasha So RN  Outcome: Progressing  8/31/2023 0248 by Natasha So RN  Outcome: Progressing     Problem: Discharge Planning  Goal: Discharge to home or other facility with appropriate resources  8/31/2023 0744 by Rosalio Hopper RN  Outcome: Progressing  8/31/2023 0248 by Natasha So RN  Outcome: Progressing  8/31/2023 0248 by Natasha So RN  Outcome: Progressing     Problem: Safety - Adult  Goal: Free from fall injury  8/31/2023 0744 by Rosalio Hopper RN  Outcome: Progressing  8/31/2023 0248 by Natasha So RN  Outcome: Progressing     Problem: ABCDS Injury Assessment  Goal: Absence of physical injury  8/31/2023 0744 by Rosalio Hopper RN  Outcome: Progressing  8/31/2023 0248 by Natasha So RN  Outcome: Progressing  8/31/2023 0248 by Natasha So RN  Outcome: Progressing  Flowsheets (Taken 8/30/2023 1434 by Saba Bethea RN)  Absence of Physical Injury: Implement safety measures based on patient assessment     Problem: Skin/Tissue Integrity  Goal: Absence of new skin breakdown  Description: 1. Monitor for areas of redness and/or skin breakdown  2. Assess vascular access sites hourly  3. Every 4-6 hours minimum:  Change oxygen saturation probe site  4. Every 4-6 hours:  If on nasal continuous positive airway pressure, respiratory therapy assess nares and determine need for appliance change or resting period.   8/31/2023 0744 by Rosalio Hopper RN  Outcome: Progressing  8/31/2023 0248 by Natasha So RN  Outcome: Progressing  8/31/2023 0248 by Natasha So RN  Outcome: Progressing

## 2023-08-31 NOTE — PROGRESS NOTES
Hand off report given to Jennifer Amanda RN. Patient is stable showing no signs of distress and has no current needs at this time. Call light is in reach and bed is in lowest position. Care is transferred at this time.

## 2023-08-31 NOTE — PROGRESS NOTES
Pt awake in bed. Assessment completed. A&Ox4. Perfect serve sent to Brooks Shaw NP with GI pt requesting her Zoloft for anxiety. Okay per Kazakh Cadet to give Zoloft before procedure. Pt denies any further needs at this time. Call light in reach and bed in lowest position.

## 2023-08-31 NOTE — H&P
History and Physical / Pre-Sedation Assessment    Patient:  Murray Vergara   :   1940     Intended Procedure:  Sigmoidoscopy    HPI: 81year old female with history of HTN, HLD, CAD s/p CABG, COPD, A fib, CKD admitted with rectal bleeding. Recent colonsocopy with small polyp resection on 23. CTA suggestive of rectal source, likely hemorrhoids.     Past Medical History:   Diagnosis Date    NADJA (acute kidney injury) ([address])     Asthma     CAD (coronary artery disease)     CHF (congestive heart failure) (HCC)     unsure    Chronic anxiety     COPD (chronic obstructive pulmonary disease) (HCC)     GERD (gastroesophageal reflux disease) 2021    Heart attack ([address]) 2019    History of blood transfusion     Hyperlipidemia     Hypertension     MRSA (methicillin resistant staph aureus) culture positive 2019    + resp cx    OA (osteoarthritis) 2014    Thyroid disease      Past Surgical History:   Procedure Laterality Date    BRONCHOSCOPY  2019    Dr. Piedad Lacey - w/BAL    CARDIAC CATHETERIZATION  2019    Dr. Kush Mo N/A 2020    COLONOSCOPY DIAGNOSTIC performed by Hill Sorenson DO at [facility] N/A 10/22/2021    COLONOSCOPY POLYPECTOMY SNARE/COLD BIOPSY performed by Nadira Robbins MD at [facility] N/A 2023    COLONOSCOPY POLYPECTOMY SNARE performed by Abbi Noland MD at [address] 2019    OFF PUMP CORONARY ARTERY BYPASS GRAFTING X2, INTERNAL MAMMARY ARTERY, SAPHENOUS VEIN GRAFT performed by Mills Cushing, MD at [facility] CATH  2021    IR MIDLINE CATH 2021 [facility] SPECIAL PROCEDURES    MASTECTOMY, PARTIAL Left     SIGMOIDOSCOPY  2020    4 bands    SIGMOIDOSCOPY N/A 2020    FLEX SIG W/ BANDING SIGMOIDOSCOPY DIAGNOSTIC FLEXIBLE performed by Hill Sorenson DO at [address] [facility] GASTROINTESTINAL ENDOSCOPY N/A 1/9/2023    EGD DIAGNOSTIC ONLY performed by Tano Roe MD at [facility] 1/13/2023    EGD BIOPSY performed by Tano Roe MD at [facility]  1/13/2023    EGD ESOPHAGOGASTRODUODENOSCOPY DILATATION performed by Tano Roe MD at [address] [employer]       Medications reviewed  Prior to Admission medications    Medication Sig Start Date End Date Taking?  Authorizing Provider   atorvastatin (LIPITOR) 40 MG tablet Take 1 tablet by mouth at bedtime   Yes Historical Provider, MD   busPIRone (BUSPAR) 5 MG tablet Take 1 tablet by mouth every 8 hours as needed (anxiety)   Yes Historical Provider, MD   promethazine-dextromethorphan (PROMETHAZINE-DM) 6.25-15 MG/5ML syrup Take by mouth every 4 hours as needed for Cough   Yes Historical Provider, MD   XARELTO 15 MG TABS tablet Take 1 tablet by mouth daily 7/31/23   Historical Provider, MD   magnesium oxide (MAG-OX) 400 (240 Mg) MG tablet Take 1 tablet by mouth daily 8/24/23   Paola Suarez [facility], APRN - NP   metoprolol succinate (TOPROL XL) 50 MG extended release tablet Take 1 tablet by mouth nightly 8/22/23   Manvel Cool Lyssa, APRN - CNP   melatonin 5 MG TBDP disintegrating tablet Take 1 tablet by mouth nightly 8/22/23   Manvel Cool Lyssa, APRN - CNP   aspirin 81 MG chewable tablet Take 1 tablet by mouth daily 8/23/23   Manvel Cool Lyssa, APRN - CNP   pantoprazole (PROTONIX) 40 MG tablet Take 1 tablet by mouth daily 8/23/23   Manvel Cool Lyssa, APRN - CNP   potassium chloride (KLOR-CON M) 20 MEQ extended release tablet Take 1 tablet by mouth daily 8/7/23   Gene Barnes MD   torsemide (DEMADEX) 20 MG tablet Take 1 tablet by mouth daily    Historical Provider, MD   Fluticasone-Umeclidin-Vilant (TRELEGY ELLIPTA) 200-62.5-25 MCG/INH AEPB Inhale 1 puff into the lungs daily    Historical Provider, MD   albuterol (PROVENTIL)

## 2023-08-31 NOTE — OP NOTE
[address] [facility] [address] [place]                 [id number] [place] [address] Drive                                OPERATIVE REPORT    PATIENT NAME: Moon Reyes                 :        1940  MED REC NO:   [id number]                          ROOM:         ACCOUNT NO:   [de-identified]                           ADMIT DATE: 2023  PROVIDER:     Brooks Castillo MD    DATE OF PROCEDURE:  2023    PREPROCEDURE DIAGNOSES:  1. Rectal bleeding. 2.  GI bleed. 3.  Anemia. PROCEDURE:  Flexible sigmoidoscopy. POSTPROCEDURE DIAGNOSES:  1. Internal hemorrhoids. 2.  Anal skin tag. 3.  No active bleeding. SURGEON:  Brooks Castillo MD    PROCEDURE INDICATIONS:  This is an 44-year-old female with history of  hypertension; hyperlipidemia; coronary artery disease, status post CABG;  COPD; atrial fibrillation; chronic kidney disease and was admitted with  rectal bleeding in the setting of anticoagulation. The patient is on  Xarelto and was recently hospitalized at [facility] with rectal bleeding. She underwent EGD and colonoscopy on 2023. She did have two small  polyps in the right colon removed with cold snare polypectomy method. Her EGD showed severe esophagitis and esophageal stricture was dilated. She continued to stay on iron supplements and resumed Xarelto and  aspirin upon which she developed reasonably worsening rectal bleeding. She presented to the emergency room from the nursing home for further  evaluation. Her hemoglobin has declined, down to 7.2. Did not require  any transfusion. CTA of the abdomen and pelvis yesterday did show small  amount of hemorrhoids seen posteriorly in the rectum with asymmetric  thickening of the rectum reflecting hemorrhoids versus rectal mass. Thus, a sigmoidoscopy is being performed to rule out high-risk  pathology. MEDICATIONS:  Versed 3 mg, fentanyl 50 mcg.     PROCEDURE DETAILS:  Informed consent obtained after discussing risks,  benefits, alternatives. Full history and physical was performed. The  patient was classified as ASA class III. Medications were sequentially  given to achieve adequate sedation. Cardiopulmonary status was  continuously monitored throughout the procedure. The patient was placed  in left lateral decubitus position. Once adequately sedated, a standard  pediatric colonoscope was inserted in the anus and advanced to the  descending colon at 60 cm from entry. Entire mucosa of the descending  colon, sigmoid colon, rectum was examined carefully during withdrawal.   The patient tolerated the procedure well with no difficulties. The  colon prep was poor. FINDINGS:  Rectum: The digital rectal exam was normal except for small external  hemorrhoids. Colon:  There was dark and sticky adherent stool throughout the examined  mucosa. No evidence of inflammation, ulcers, pseudomembranes, polyps or  masses on the examined mucosa. In the rectum there was a single skin  tag. Retroflex view of the rectum showed internal hemorrhoids. However, there was no evidence of fresh blood or active bleeding. SUMMARY:  1. Internal-external hemorrhoids. 2.  Anal skin tag. 3.  No active bleeding. 4.  No evidence of malignancy on today's exam.    RECOMMENDATIONS:  1. Return patient to floor for continued medical care. 2.  Monitor hemoglobin. 3.  Observe for signs of bleeding. 4.  Resume diet. 5.  Encourage Metamucil and probiotics daily. 6.  Can give a 10-day course of hydrocortisone suppository. 7.  Follow up as needed.     EBL: <5mL    Robert Rubi MD    D: 08/31/2023 12:44:33       T: 08/31/2023 12:47:53     [email or web address]  Job#: [id number]     Doc#: [id number]    CC:  MD Jennifer Freeman MD

## 2023-08-31 NOTE — PROGRESS NOTES
End of shift report given to [facility]  -  care transferred. Patient resting denying needs. Call light in patient's reach.

## 2023-08-31 NOTE — BRIEF OP NOTE
Brief Postoperative Note      Patient: Roshni Hardin  YOB: 1940  MRN: [id number]    Date of Procedure: 8/31/2023    Pre-Op Diagnosis Codes:     * Rectal bleeding [K62.5]    Post-Op Diagnosis:  internal hemorrhoids and anal skin tag, no active bleeding       Procedure(s): FLEX SIG.     Surgeon(s):  Reynaldo Duarte MD    Assistant:  * No surgical staff found *    Anesthesia: IV Sedation    Estimated Blood Loss (mL): Minimal    Complications: None    Specimens:   * No specimens in log *    Implants:  * No implants in log *      Drains:   [REMOVED] External Urinary Catheter (Removed)   Site Assessment Clean,dry & intact 08/22/23 0915   Placement Replaced 08/22/23 0915   Securement Method Other (Comment) 08/22/23 0915   Catheter Care Catheter/Wick replaced 08/22/23 0915   Perineal Care Yes 08/22/23 0915   Suction 40 mmgHg continuous 08/22/23 0915   Urine Color Yellow 08/22/23 0915   Urine Appearance Clear 08/22/23 0915   Output (mL) 400 mL 08/21/23 8717       Findings: non-bleeding internal hemorrhoids and anal skin tag, no active bleeding    Recommendation  Continue supportive care  Monitor Hgb  Observe for signs of bleeding  Resume diet  Metamucil and probiotics  Can give 10d course of hydrocortisone suppository      Electronically signed by Reynaldo Duarte MD on 8/31/2023 at 12:38 PM

## 2023-09-01 VITALS
SYSTOLIC BLOOD PRESSURE: 120 MMHG | BODY MASS INDEX: 20.03 KG/M2 | HEIGHT: 63 IN | TEMPERATURE: 97.2 F | WEIGHT: 113.06 LBS | OXYGEN SATURATION: 99 % | DIASTOLIC BLOOD PRESSURE: 82 MMHG | RESPIRATION RATE: 16 BRPM | HEART RATE: 88 BPM

## 2023-09-01 LAB
BLOOD BANK DISPENSE STATUS: NORMAL
BLOOD BANK PRODUCT CODE: NORMAL
BPU ID: NORMAL
DESCRIPTION BLOOD BANK: NORMAL
HCT VFR BLD AUTO: 25.4 % (ref 36–48)
HGB BLD-MCNC: 9.5 G/DL (ref 12–16)

## 2023-09-01 PROCEDURE — 2580000003 HC RX 258

## 2023-09-01 PROCEDURE — 85018 HEMOGLOBIN: CPT

## 2023-09-01 PROCEDURE — 99239 HOSP IP/OBS DSCHRG MGMT >30: CPT | Performed by: INTERNAL MEDICINE

## 2023-09-01 PROCEDURE — 36430 TRANSFUSION BLD/BLD COMPNT: CPT

## 2023-09-01 PROCEDURE — 6360000002 HC RX W HCPCS: Performed by: INTERNAL MEDICINE

## 2023-09-01 PROCEDURE — 36415 COLL VENOUS BLD VENIPUNCTURE: CPT

## 2023-09-01 PROCEDURE — 85014 HEMATOCRIT: CPT

## 2023-09-01 PROCEDURE — 94761 N-INVAS EAR/PLS OXIMETRY MLT: CPT

## 2023-09-01 PROCEDURE — 6370000000 HC RX 637 (ALT 250 FOR IP): Performed by: INTERNAL MEDICINE

## 2023-09-01 PROCEDURE — 94640 AIRWAY INHALATION TREATMENT: CPT

## 2023-09-01 PROCEDURE — 6370000000 HC RX 637 (ALT 250 FOR IP)

## 2023-09-01 PROCEDURE — 2700000000 HC OXYGEN THERAPY PER DAY

## 2023-09-01 RX ORDER — HYDROCORTISONE ACETATE 25 MG/1
25 SUPPOSITORY RECTAL 2 TIMES DAILY
Qty: 17 SUPPOSITORY | Refills: 0 | DISCHARGE
Start: 2023-09-01 | End: 2023-09-10

## 2023-09-01 RX ORDER — SODIUM CHLORIDE 9 MG/ML
INJECTION, SOLUTION INTRAVENOUS PRN
Status: DISCONTINUED | OUTPATIENT
Start: 2023-09-01 | End: 2023-09-01 | Stop reason: HOSPADM

## 2023-09-01 RX ORDER — LACTOBACILLUS RHAMNOSUS GG 10B CELL
1 CAPSULE ORAL
DISCHARGE
Start: 2023-09-02

## 2023-09-01 RX ORDER — ASPIRIN 81 MG/1
81 TABLET, CHEWABLE ORAL DAILY
DISCHARGE
Start: 2023-09-04

## 2023-09-01 RX ADMIN — TIOTROPIUM BROMIDE INHALATION SPRAY 2 PUFF: 3.12 SPRAY, METERED RESPIRATORY (INHALATION) at 07:59

## 2023-09-01 RX ADMIN — GUAIFENESIN 600 MG: 600 TABLET, EXTENDED RELEASE ORAL at 08:28

## 2023-09-01 RX ADMIN — PSYLLIUM HUSK 1 PACKET: 3.4 POWDER ORAL at 08:29

## 2023-09-01 RX ADMIN — SODIUM CHLORIDE, PRESERVATIVE FREE 10 ML: 5 INJECTION INTRAVENOUS at 08:31

## 2023-09-01 RX ADMIN — FLUTICASONE PROPIONATE 1 SPRAY: 50 SPRAY, METERED NASAL at 09:10

## 2023-09-01 RX ADMIN — FERROUS SULFATE TAB 325 MG (65 MG ELEMENTAL FE) 325 MG: 325 (65 FE) TAB at 08:29

## 2023-09-01 RX ADMIN — ROFLUMILAST 500 MCG: 500 TABLET ORAL at 08:28

## 2023-09-01 RX ADMIN — HYDROCORTISONE ACETATE 25 MG: 25 SUPPOSITORY RECTAL at 08:29

## 2023-09-01 RX ADMIN — SERTRALINE HYDROCHLORIDE 50 MG: 50 TABLET ORAL at 08:29

## 2023-09-01 RX ADMIN — Medication 1 CAPSULE: at 08:29

## 2023-09-01 RX ADMIN — ALBUTEROL SULFATE 2.5 MG: 2.5 SOLUTION RESPIRATORY (INHALATION) at 15:52

## 2023-09-01 RX ADMIN — Medication 2 PUFF: at 08:00

## 2023-09-01 RX ADMIN — BUSPIRONE HYDROCHLORIDE 5 MG: 5 TABLET ORAL at 16:56

## 2023-09-01 RX ADMIN — PANTOPRAZOLE SODIUM 40 MG: 40 TABLET, DELAYED RELEASE ORAL at 06:47

## 2023-09-01 RX ADMIN — ALBUTEROL SULFATE 2.5 MG: 2.5 SOLUTION RESPIRATORY (INHALATION) at 07:53

## 2023-09-01 NOTE — DISCHARGE INSTR - COC
Continuity of Care Form    Patient Name: Carlene Whittaker   :    MRN:  [id number]    Admit date:  2023  Discharge date:  23    Code Status Order: Full Code   Advance Directives:   Advance Care Flowsheet Documentation       Date/Time Healthcare Directive Type of Healthcare Directive Copy in [address] Agent's Name Healthcare Agent's Phone Number    23 1148 No, patient does not have an advance directive for healthcare treatment -- -- -- -- --            Admitting Physician:  Sid Noel MD  PCP: Tyson Godfrey MD    Discharging Nurse: [facility] Unit/Room#: /7248-80  Discharging Unit Phone Number: [phone]    Emergency Contact:   Extended Emergency Contact Information  Primary Emergency Contact: [address] Phone: [phone]  Relation: Child  Secondary Emergency Contact: [address] [place] [address] Road Phone: [phone]  Mobile Phone: [phone]  Relation: Niece/Nephew    Past Surgical History:  Past Surgical History:   Procedure Laterality Date    BRONCHOSCOPY  2019    Dr. Phuong gray/BAL    CARDIAC CATHETERIZATION  2019    Dr. Maren Morillo 2020    COLONOSCOPY DIAGNOSTIC performed by Bill Georges DO at [facility] 10/22/2021    COLONOSCOPY POLYPECTOMY SNARE/COLD BIOPSY performed by Jose Eduardo Erazo MD at [facility] N/A 2023    COLONOSCOPY POLYPECTOMY SNARE performed by Alma Alfaro MD at [facility] N/A 2019    OFF PUMP CORONARY ARTERY BYPASS GRAFTING X2, INTERNAL MAMMARY ARTERY, SAPHENOUS VEIN GRAFT performed by Timoteo Chan MD at [facility] CATH  2021    IR MIDLINE CATH 2021 [facility] SPECIAL PROCEDURES    MASTECTOMY, PARTIAL Left     SIGMOIDOSCOPY  2020    4 bands    SIGMOIDOSCOPY N/A 2020 Facility/ Agency   Name:   Address:  Phone:  Fax:    Dialysis Facility (if applicable)   Name:  Address:  Dialysis Schedule:  Phone:  Fax:    / signature: {Esignature:[id number]}    PHYSICIAN SECTION    Prognosis: Good    Condition at Discharge: Stable    Rehab Potential (if transferring to Rehab): fair    Recommended Labs or Other Treatments After Discharge:     Physician Certification: I certify the above information and transfer of Blas Gowers  is necessary for the continuing treatment of the diagnosis listed and that she requires [address] for less 30 days.      Update Admission H&P: No change in H&P    PHYSICIAN SIGNATURE:  Electronically signed by Gabriella Griffin MD on 9/1/23 at 1:17 PM EDT

## 2023-09-01 NOTE — PLAN OF CARE
Problem: Discharge Planning  Goal: Discharge to home or other facility with appropriate resources  9/1/2023 1658 by Clair Nickerson RN  Outcome: Adequate for Discharge  9/1/2023 1032 by Clair Nickerson RN  Outcome: Progressing     Problem: Safety - Adult  Goal: Free from fall injury  Outcome: Adequate for Discharge     Problem: Pain  Goal: Verbalizes/displays adequate comfort level or baseline comfort level  Outcome: Adequate for Discharge

## 2023-09-01 NOTE — PROGRESS NOTES
Critical lab, H&H 6.7/20.1, perfect served hospitalist Shayla Jordan MD, No new orders at this time.

## 2023-09-01 NOTE — PLAN OF CARE
HEART FAILURE CARE PLAN:    Comorbidities Reviewed: Yes   Patient has a past medical history of NADJA (acute kidney injury) ([address]), Asthma, CAD (coronary artery disease), CHF (congestive heart failure) ([address]), Chronic anxiety, COPD (chronic obstructive pulmonary disease) ([address]), GERD (gastroesophageal reflux disease), Heart attack ([address]), History of blood transfusion, Hyperlipidemia, Hypertension, MRSA (methicillin resistant staph aureus) culture positive, OA (osteoarthritis), and Thyroid disease. ECHOCARDIOGRAM Reviewed: Yes   Patient's Ejection Fraction (EF) is greater than 40%    Weights Reviewed:  Yes   Admission weight: 110 lb 4.8 oz (50 kg)   Wt Readings from Last 3 Encounters:   08/31/23 118 lb 12.8 oz (53.9 kg)   08/23/23 113 lb 4.8 oz (51.4 kg)   08/03/23 116 lb (52.6 kg)     Intake & Output Reviewed: Yes     Intake/Output Summary (Last 24 hours) at 8/31/2023 2219  Last data filed at 8/31/2023 1348  Gross per 24 hour   Intake 40 ml   Output --   Net 40 ml     Medications Reviewed: Yes   SCHEDULED HOSPITAL MEDICATIONS:   hydrocortisone  25 mg Rectal BID    psyllium  1 packet Oral Daily    [START ON 9/1/2023] lactobacillus  1 capsule Oral Daily with breakfast    [Held by provider] rivaroxaban  15 mg Oral Daily    [Held by provider] aspirin  81 mg Oral Daily    atorvastatin  40 mg Oral Nightly    ferrous sulfate  325 mg Oral Daily with breakfast    guaiFENesin  600 mg Oral Daily    melatonin  5 mg Oral Nightly    metoprolol succinate  50 mg Oral QHS    pantoprazole  40 mg Oral Daily    Roflumilast  500 mcg Oral Daily    sertraline  50 mg Oral Daily    [Held by provider] torsemide  20 mg Oral Daily    sodium chloride flush  5-40 mL IntraVENous 2 times per day    budesonide-formoterol  2 puff Inhalation BID RT    And    tiotropium  2 puff Inhalation Daily RT    albuterol  2.5 mg Nebulization 4x Daily RT     ACE/ARB/ARNI is REQUIRED for EF </= 83% SYSTOLIC FAILURE:   ACE[de-identified] None  ARB[de-identified] None  ARNI[de-identified]

## 2023-09-01 NOTE — PLAN OF CARE
Problem: Chronic Conditions and Co-morbidities  Goal: Patient's chronic conditions and co-morbidity symptoms are monitored and maintained or improved  8/31/2023 2217 by Dedra Galicia RN  Outcome: Progressing     Problem: Discharge Planning  Goal: Discharge to home or other facility with appropriate resources  9/1/2023 1032 by Shaunna Freeman RN  Outcome: Progressing  8/31/2023 2217 by Dedra Galicia RN  Outcome: Progressing     Problem: Safety - Adult  Goal: Free from fall injury  8/31/2023 2217 by Dedra Galicia RN  Outcome: Progressing     Problem: ABCDS Injury Assessment  Goal: Absence of physical injury  8/31/2023 2217 by Dedra Galicia RN  Outcome: Progressing     Problem: Skin/Tissue Integrity  Goal: Absence of new skin breakdown  Description: 1. Monitor for areas of redness and/or skin breakdown  2. Assess vascular access sites hourly  3. Every 4-6 hours minimum:  Change oxygen saturation probe site  4. Every 4-6 hours:  If on nasal continuous positive airway pressure, respiratory therapy assess nares and determine need for appliance change or resting period.   8/31/2023 2217 by Dedra Galicia RN  Outcome: Progressing     Problem: Pain  Goal: Verbalizes/displays adequate comfort level or baseline comfort level  8/31/2023 2217 by Dedra Galicia RN  Outcome: Progressing

## 2023-09-01 NOTE — PROGRESS NOTES
PROGRESS NOTE  S:83 yrs Patient  admitted on 8/30/2023 with GI bleed [K92.2]  Lower GI bleed [K92.2]  Symptomatic anemia [D64.9] . Today, she has no continued bleeding. Transfused 1 unit overnight     Exam:   Vitals:    09/01/23 0753   BP:    Pulse: 88   Resp: 18   Temp:    SpO2: 94%   Generalized: alert, no acute distress  HEENT: sclera clear, anicteric  Neck supple, trachea midline  Heart PAF  Abdomen: soft, NT, ND  Extremities: no edema       Medications: Reviewed    Labs:  CBC:   Recent Labs     08/30/23  0837 08/30/23  1732 08/31/23  0546 08/31/23  1437 08/31/23  2148   WBC 12.0*  --   --   --   --    HGB 7.2*   < > 7.2* 7.7* 6.7*   HCT 22.6*   < > 21.6* 23.1* 20.1*   MCV 89.9  --   --   --   --      --   --   --   --     < > = values in this interval not displayed. BMP:   Recent Labs     08/30/23  0838 08/31/23  0546   * 137   K 4.8 5.0   CL 97* 105   CO2 26 24   BUN 29* 26*   CREATININE 1.3* 1.1     LIVER PROFILE:   Recent Labs     08/30/23  0838   AST 17   ALT 10   PROT 6.7   BILITOT <0.2   ALKPHOS 67     PT/INR:   Recent Labs     08/30/23  0838 08/31/23  0546   INR 1.95* 1.27*     Assessment    81 y.o. female with pmx of CAD s/p CABG, anxiety, COPD, PAF (on Xarelto), CKD, GERD, HTN, HLD, OA admitted with c/o rectal bleeding likely secondary to hemorrhoids. CTA with small bleeding in rectum with asymmetric thickening, suggestive of rectal mass vs. hemorrhoids. Transfused 1 unit PRBCs. Recent colonoscopy 8/7 with polyps and hemorrhoids, no active bleeding. Sigmoidoscopy 8/31 with internal and external hemorrhoids.       Plan  Continue supportive care  Continue hydrocortisone suppositories x 10 days  Trend H&H  Monitor for signs of bleeding  PPI daily  Okay for high fiber diet  Okay for discharge from GI standpoint     Claudene Heron, APRN - CNP  10:21 AM 9/1/2023

## 2023-09-01 NOTE — DISCHARGE SUMMARY
Name:  Monty Melendez  Room:  /2395-15  MRN:    [id number]    Discharge Summary      This discharge summary is in conjunction with a complete physical exam done on the day of discharge. Attending Physician: Dr. Jose Siddiqui  Discharging Physician: Dr. Light Fossa: 8/30/2023  Discharge:  9/1/2023    HPI:  The patient is a 81 y.o. female with pmhx HTN, HLD, CAD s/p CABG, CHF, Afib, COPD, CKD who presents to [facility] from [facility] for reports of rectal bleeding and fatigue. Patient states she has been feeling more fatigued, dizzy, and weak over the last several days. Today, she had an accident in her pull up and nursing staff at the nursing home found blood. Patient is unsure of how much blood. History obtained from the patient and review of EMR. Diagnoses this Admission and Hospital Course   #Acute lower GIB   #Hemorrhoids   - hemoccult +  - CTA abdomen pelvis as above-  hemorrhoids vs rectal mass . - recent Colonoscopy 8/7 with polyp removal, hemorrhoids. Seen by GI and sigmoidoscopy completed today. Confirmed hemorrhoids. No mass    - Hgb 7.2, transfused 1 unit PRBC in ED. baseline hgb ~ 8-9.  trend  H/H and transfuse to maintain Hgb > 7.  Hgb 7.6 now   - Xarelto held   - IVFs  - PPI   ->  S/P sigmoidoscopy today 8/31  Findings: non-bleeding internal hemorrhoids and anal skin tag, no active bleeding.  - start Metamucil and probiotics and hydrocortisone suppository     #Elevated troponin   - 54, repeat 68  - appears chronic elevation per review  - denies chest pain  - EKG without acute ischemic changes  - monitor on tele      #HTN  - continue Toprol XL, Demadex held 2/2 CKD after receiving contrast  - resume as able   - BPs stable  - monitor      #CAD s/p CABG 2019  - continue statin, asa held 2/2 above- resume as able      #HLD  - continue statin      #Chronic diastolic CHF  - appears compensated  - last Echo 5/8/23 EF 50-55%  - continue Toprol XL     #PAF  - Xarelto held 2/2 above, resume as able

## 2023-09-01 NOTE — CARE COORDINATION
DISCHARGE ORDER  Date/Time 2023 2:11 PM  Completed by: Tracee Wagner RN, Case Management    Patient Name: Sandra Torres    : 1940      Admit order Date and Status:ip 23  Noted discharge order. (verify MD's last order for status of admission/Traditional Medicare 3 [place] Inpatient qualifying stay required for SNF)    Confirmed discharge plan with:              Patient:  Yes              When pt confirms DC plan does any support person need to be contacted by CM No                    Discharge to Facility: [address] [place] [address] phone number for staff giving report: [phone]   Pre-certification completed: [facility] Exemption Notification (HENS) completed: na   Discharge orders and Continuity of Care faxed to facility:  epic      Transportation:               Medical Transport explained with choice list offered to pt/family. Choice:(no preference)  Agency used: quality   time:   [phone]      Pt/family/Nursing/Facility aware of  time:   Yes Names: deb, alessia sahara    Ambulance form completed:  yes:      Date Last IMM Given: 23    Comments: met with pt at bedside. Pt is agreeable to return to [facility] this pm. Facility is aware. No additional DC or DME needs identified. Pt is being d/c'd to [facility] today. Pt's O2 sats are 100% on 3 liters . Discharge timeout done with rn, cm, pt. All discharge needs and concerns addressed. Discharging nurse to complete LUPE, reconcile AVS, and place final copy with patient's discharge packet. Discharging RN to ensure that written prescriptions for  Level II medications are sent with patient to the facility as per protocol.
with any other family members/significant others, and if so, who? Yes  Plans to Return to Present Housing: Yes  Other Identified Issues/Barriers to RETURNING to current housing: na  Potential Assistance needed at discharge: [address]            Potential DME:    Patient expects to discharge to: [facility] for transportation at discharge:      Financial    Payor: Shanna Salomon / Plan: MEDICARE PART A AND B / Product Type: *No Product type* /     Does insurance require precert for SNF: No    Potential assistance Purchasing Medications: No  Meds-to-Beds request:        [address], [address] [facility],[address]  [address]  Phone: [phone] Fax: [phone]    [facility] [id number] - [address], [place]  [address]  [address] [place] [address]  Phone: [phone] Fax: [phone]      Notes:    Factors facilitating achievement of predicted outcomes: Family support, Caregiver support, Cooperative, and Pleasant    Barriers to discharge: gi bleed    Additional Case Management Notes: Reviewed chart and met with pt at bedside. Pt is from [facility] and is able to return at [place]. No barriers. Will follow    The Plan for Transition of Care is related to the following treatment goals of GI bleed [K92.2]  Lower GI bleed [K92.2]  Symptomatic anemia [V65.1]    IF APPLICABLE: The Patient and/or patient representative Quentin Severino and her family were provided with a choice of provider and agrees with the discharge plan. Freedom of choice list with basic dialogue that supports the patient's individualized plan of care/goals and shares the quality data associated with the providers was provided to:     Patient Representative Name:       The Patient and/or Patient Representative Agree with the Discharge Plan?       Benjamin Capps RN  Case Management Department  Ph: [phone] Fax: [phone]

## 2023-09-01 NOTE — DISCHARGE INSTR - DIET

## 2023-09-01 NOTE — PROGRESS NOTES
Patient educated on discharge instructions as well as new medications use, dosage, administration and possible side effects. Patient verified knowledge. IV removed without difficulty and dry dressing in place. Telemetry monitor removed and returned to [facility]. Pt left facility in stable condition to home with all of their personal belongings.

## 2023-09-01 NOTE — PROGRESS NOTES
Transport arrived for patient to go to [facility], daughter plans on taking all patient belongings.

## 2023-09-06 NOTE — PROGRESS NOTES
independently gathered by the clinical support staff, while the remaining scribed note accurately describes my personal service to the patient. Cost of prescription medications and patient compliance have been reviewed with patient. All questions answered. Thank you for allowing me to participate in the care of this individual.    Talat An.  Nahid Gama M.D., [facility]

## 2023-09-14 ENCOUNTER — OFFICE VISIT (OUTPATIENT)
Dept: CARDIOLOGY CLINIC | Age: 83
End: 2023-09-14
Payer: MEDICARE

## 2023-09-14 VITALS
BODY MASS INDEX: 19.35 KG/M2 | WEIGHT: 109.2 LBS | HEIGHT: 63 IN | HEART RATE: 86 BPM | SYSTOLIC BLOOD PRESSURE: 100 MMHG | DIASTOLIC BLOOD PRESSURE: 50 MMHG | OXYGEN SATURATION: 94 %

## 2023-09-14 DIAGNOSIS — Z87.891 HISTORY OF TOBACCO ABUSE: ICD-10-CM

## 2023-09-14 DIAGNOSIS — I25.110 CORONARY ARTERY DISEASE INVOLVING NATIVE CORONARY ARTERY OF NATIVE HEART WITH UNSTABLE ANGINA PECTORIS (HCC): ICD-10-CM

## 2023-09-14 DIAGNOSIS — E78.5 HYPERLIPIDEMIA, UNSPECIFIED HYPERLIPIDEMIA TYPE: Primary | ICD-10-CM

## 2023-09-14 DIAGNOSIS — I10 PRIMARY HYPERTENSION: ICD-10-CM

## 2023-09-14 PROCEDURE — G8400 PT W/DXA NO RESULTS DOC: HCPCS | Performed by: INTERNAL MEDICINE

## 2023-09-14 PROCEDURE — 1123F ACP DISCUSS/DSCN MKR DOCD: CPT | Performed by: INTERNAL MEDICINE

## 2023-09-14 PROCEDURE — 3078F DIAST BP <80 MM HG: CPT | Performed by: INTERNAL MEDICINE

## 2023-09-14 PROCEDURE — 1090F PRES/ABSN URINE INCON ASSESS: CPT | Performed by: INTERNAL MEDICINE

## 2023-09-14 PROCEDURE — 99214 OFFICE O/P EST MOD 30 MIN: CPT | Performed by: INTERNAL MEDICINE

## 2023-09-14 PROCEDURE — G8427 DOCREV CUR MEDS BY ELIG CLIN: HCPCS | Performed by: INTERNAL MEDICINE

## 2023-09-14 PROCEDURE — 3074F SYST BP LT 130 MM HG: CPT | Performed by: INTERNAL MEDICINE

## 2023-09-14 PROCEDURE — 1111F DSCHRG MED/CURRENT MED MERGE: CPT | Performed by: INTERNAL MEDICINE

## 2023-09-14 PROCEDURE — G8420 CALC BMI NORM PARAMETERS: HCPCS | Performed by: INTERNAL MEDICINE

## 2023-09-14 PROCEDURE — 1036F TOBACCO NON-USER: CPT | Performed by: INTERNAL MEDICINE

## 2023-09-15 ENCOUNTER — TELEPHONE (OUTPATIENT)
Dept: CARDIOLOGY CLINIC | Age: 83
End: 2023-09-15

## 2023-09-15 LAB
A/G RATIO, EXTERNAL: NORMAL
ALBUMIN, EXTERNAL: NORMAL
ALK PHOS, EXTERNAL: NORMAL
ANION GAP, EXTERNAL: NORMAL
BASOPHILS ABSOLUTE: 0.1 /ΜL
BASOPHILS RELATIVE PERCENT: 0.8 %
BILI DIRECT, EXTERNAL: NORMAL
BILI TOTAL, EXTERNAL: NORMAL
BUN BLDV-MCNC: 16 MG/DL
BUN, EXTERNAL: 16
BUN/CREATININE RATIO, EXTERNAL: 11
CALCIUM SERPL-MCNC: 8.6 MG/DL
CALCIUM, EXTERNAL: 8.6
CALCIUM, ION, EXTERNAL: NORMAL
CHLORIDE BLD-SCNC: 96 MMOL/L
CHLORIDE, EXTERNAL: 96
CO2, EXTERNAL: 23
CO2: 23 MMOL/L
CREAT SERPL-MCNC: 1.4 MG/DL
CREATININE, EXTERNAL: 1.4
EGFR IF AFA, EXTERNAL: NORMAL
EGFR IF NONAFRICAN AMERICAN: 36
EGFR: 36
EOSINOPHILS ABSOLUTE: 0.2 /ΜL
EOSINOPHILS RELATIVE PERCENT: 2.3 %
GLOBULIN, EXTERNAL: NORMAL
GLUCOSE BLD-MCNC: 80 MG/DL
GLUCOSE SER, EXTERNAL: NORMAL
HCT VFR BLD CALC: 29.9 % (ref 36–46)
HEMOGLOBIN: 9.8 G/DL (ref 12–16)
LYMPHOCYTES ABSOLUTE: 1.8 /ΜL
LYMPHOCYTES RELATIVE PERCENT: 20.5 %
MCH RBC QN AUTO: 32 PG
MCHC RBC AUTO-ENTMCNC: 32.9 G/DL
MCV RBC AUTO: 97.3 FL
MONOCYTES ABSOLUTE: 0.5 /ΜL
MONOCYTES RELATIVE PERCENT: 6 %
NEUTROPHILS ABSOLUTE: 6.1 /ΜL
NEUTROPHILS RELATIVE PERCENT: 70.4 %
PLATELET # BLD: 255 K/ΜL
PMV BLD AUTO: ABNORMAL FL
POTASSIUM SERPL-SCNC: 4 MMOL/L
POTASSIUM, EXTERNAL: 4
PROTEIN TOT, EXTERNAL: NORMAL
RBC # BLD: 3.07 10^6/ΜL
SGOT (AST), EXTERNAL: NORMAL
SGPT (ALT), EXTERNAL: NORMAL
SODIUM BLD-SCNC: 136 MMOL/L
SODIUM, EXTERNAL: 136
WBC # BLD: 8.7 10^3/ML

## 2023-09-15 NOTE — TELEPHONE ENCOUNTER
----- Message from Tamia Workman MD sent at 9/15/2023  3:08 PM EDT -----  Stable kidney function. Blood count still anemic but improving.  Have her call PCP to discuss anemia and see what she can do to improve blood count to normal.

## 2023-09-15 NOTE — TELEPHONE ENCOUNTER
Patient informed. Will call pcp office on Monday to get fax to send him labs. They closed early today.

## 2023-10-01 PROBLEM — R79.89 ELEVATED TROPONIN: Status: RESOLVED | Noted: 2019-09-04 | Resolved: 2023-10-01

## 2023-11-08 ENCOUNTER — OFFICE VISIT (OUTPATIENT)
Dept: PULMONOLOGY | Age: 83
End: 2023-11-08
Payer: MEDICARE

## 2023-11-08 VITALS
SYSTOLIC BLOOD PRESSURE: 110 MMHG | RESPIRATION RATE: 20 BRPM | DIASTOLIC BLOOD PRESSURE: 60 MMHG | HEART RATE: 98 BPM | BODY MASS INDEX: 19.31 KG/M2 | HEIGHT: 63 IN | WEIGHT: 109 LBS | OXYGEN SATURATION: 92 %

## 2023-11-08 DIAGNOSIS — J44.9 CHRONIC OBSTRUCTIVE PULMONARY DISEASE, UNSPECIFIED COPD TYPE (HCC): Primary | ICD-10-CM

## 2023-11-08 PROCEDURE — 3078F DIAST BP <80 MM HG: CPT | Performed by: INTERNAL MEDICINE

## 2023-11-08 PROCEDURE — 1036F TOBACCO NON-USER: CPT | Performed by: INTERNAL MEDICINE

## 2023-11-08 PROCEDURE — 1090F PRES/ABSN URINE INCON ASSESS: CPT | Performed by: INTERNAL MEDICINE

## 2023-11-08 PROCEDURE — 99214 OFFICE O/P EST MOD 30 MIN: CPT | Performed by: INTERNAL MEDICINE

## 2023-11-08 PROCEDURE — G8420 CALC BMI NORM PARAMETERS: HCPCS | Performed by: INTERNAL MEDICINE

## 2023-11-08 PROCEDURE — G8484 FLU IMMUNIZE NO ADMIN: HCPCS | Performed by: INTERNAL MEDICINE

## 2023-11-08 PROCEDURE — G8427 DOCREV CUR MEDS BY ELIG CLIN: HCPCS | Performed by: INTERNAL MEDICINE

## 2023-11-08 PROCEDURE — G8400 PT W/DXA NO RESULTS DOC: HCPCS | Performed by: INTERNAL MEDICINE

## 2023-11-08 PROCEDURE — 3074F SYST BP LT 130 MM HG: CPT | Performed by: INTERNAL MEDICINE

## 2023-11-08 PROCEDURE — 3023F SPIROM DOC REV: CPT | Performed by: INTERNAL MEDICINE

## 2023-11-08 PROCEDURE — 1123F ACP DISCUSS/DSCN MKR DOCD: CPT | Performed by: INTERNAL MEDICINE

## 2023-11-08 NOTE — PROGRESS NOTES
P  [facility]                                               Pulmonary Clinic Consult     I had the pleasure of seeing  Gutierrez Ferreira     Chief Complaint   Patient presents with    New Patient     R/b Dr. Diana Murray- pt is here w/ granddaughter Elise Harada       HISTORY OF PRESENT ILLNESS:    Gutierrez Ferreira is a 81y.o. year old  has about 30 PPY smoking history     The Patient comes in with SOB that has been going on the last 5-6 years and has been to hospital multiple times and she is on 2.5 L     Her SOB  Associated with some cough and has some sputum ,clear sputum     She  states that it get worse with exercise or walking long distance and he can walk 50 with O2 And go 1-2 flight of stairs before get short winded    She daiana albuterol and trelegy       Last CT chest 1/23 shows emphysema and some fibrotic changes           ALLERGIES:    Allergies   Allergen Reactions    Latex Other (See Comments)     Burning    Other reaction(s): Dermatitis, Other (See Comments)  Burning  Burning      Codeine Other (See Comments)     \"hallucinations\"  Other reaction(s): Other (See Comments)  \"hallucinations\"  Hallucinations    Hallucinations         PAST MEDICAL HISTORY:       Diagnosis Date    NADJA (acute kidney injury) ([address])     Asthma     CAD (coronary artery disease)     CHF (congestive heart failure) ([facility])     unsure    Chronic anxiety     COPD (chronic obstructive pulmonary disease) ([facility])     GERD (gastroesophageal reflux disease) 09/09/2021    Heart attack ([address]) 2019    History of blood transfusion     Hyperlipidemia     Hypertension     MRSA (methicillin resistant staph aureus) culture positive 09/22/2019    + resp cx    OA (osteoarthritis) 08/12/2014    Thyroid disease        MEDICATIONS:   Current Outpatient Medications   Medication Sig Dispense Refill    aspirin 81 MG chewable tablet Take 1 tablet by mouth daily Resume ASA on Monday. Monitor for any recurrent GI bleed.

## 2023-12-15 ENCOUNTER — APPOINTMENT (OUTPATIENT)
Dept: GENERAL RADIOLOGY | Age: 83
DRG: 682 | End: 2023-12-15
Payer: MEDICARE

## 2023-12-15 ENCOUNTER — APPOINTMENT (OUTPATIENT)
Dept: CT IMAGING | Age: 83
DRG: 682 | End: 2023-12-15
Payer: MEDICARE

## 2023-12-15 ENCOUNTER — HOSPITAL ENCOUNTER (INPATIENT)
Age: 83
LOS: 7 days | Discharge: HOME | DRG: 682 | End: 2023-12-22
Attending: STUDENT IN AN ORGANIZED HEALTH CARE EDUCATION/TRAINING PROGRAM | Admitting: INTERNAL MEDICINE
Payer: MEDICARE

## 2023-12-15 DIAGNOSIS — N17.9 AKI (ACUTE KIDNEY INJURY) (HCC): ICD-10-CM

## 2023-12-15 DIAGNOSIS — R42 DIZZINESS: Primary | ICD-10-CM

## 2023-12-15 LAB
ALBUMIN SERPL-MCNC: 3.8 G/DL (ref 3.4–5)
ALBUMIN/GLOB SERPL: 1 {RATIO} (ref 1.1–2.2)
ALP SERPL-CCNC: 110 U/L (ref 40–129)
ALT SERPL-CCNC: 6 U/L (ref 10–40)
ANION GAP SERPL CALCULATED.3IONS-SCNC: 10 MMOL/L (ref 3–16)
AST SERPL-CCNC: 14 U/L (ref 15–37)
BASOPHILS # BLD: 0.1 K/UL (ref 0–0.2)
BASOPHILS NFR BLD: 0.9 %
BILIRUB SERPL-MCNC: 0.3 MG/DL (ref 0–1)
BILIRUB UR QL STRIP.AUTO: NEGATIVE
BUN SERPL-MCNC: 32 MG/DL (ref 7–20)
CALCIUM SERPL-MCNC: 9.9 MG/DL (ref 8.3–10.6)
CHLORIDE SERPL-SCNC: 94 MMOL/L (ref 99–110)
CHLORIDE UR-SCNC: 116 MMOL/L
CLARITY UR: CLEAR
CO2 SERPL-SCNC: 32 MMOL/L (ref 21–32)
COLOR UR: YELLOW
CREAT SERPL-MCNC: 2 MG/DL (ref 0.6–1.2)
DEPRECATED RDW RBC AUTO: 14.5 % (ref 12.4–15.4)
EOSINOPHIL # BLD: 0.4 K/UL (ref 0–0.6)
EOSINOPHIL NFR BLD: 5.1 %
FLUAV RNA RESP QL NAA+PROBE: NOT DETECTED
FLUBV RNA RESP QL NAA+PROBE: NOT DETECTED
GFR SERPLBLD CREATININE-BSD FMLA CKD-EPI: 24 ML/MIN/{1.73_M2}
GLUCOSE SERPL-MCNC: 103 MG/DL (ref 70–99)
GLUCOSE UR STRIP.AUTO-MCNC: NEGATIVE MG/DL
HCT VFR BLD AUTO: 36.4 % (ref 36–48)
HGB BLD-MCNC: 12.1 G/DL (ref 12–16)
HGB UR QL STRIP.AUTO: NEGATIVE
KETONES UR STRIP.AUTO-MCNC: NEGATIVE MG/DL
LEUKOCYTE ESTERASE UR QL STRIP.AUTO: NEGATIVE
LYMPHOCYTES # BLD: 1.9 K/UL (ref 1–5.1)
LYMPHOCYTES NFR BLD: 24.2 %
MAGNESIUM SERPL-MCNC: 2.3 MG/DL (ref 1.8–2.4)
MCH RBC QN AUTO: 30 PG (ref 26–34)
MCHC RBC AUTO-ENTMCNC: 33.2 G/DL (ref 31–36)
MCV RBC AUTO: 90.4 FL (ref 80–100)
MONOCYTES # BLD: 0.6 K/UL (ref 0–1.3)
MONOCYTES NFR BLD: 7.3 %
NEUTROPHILS # BLD: 4.9 K/UL (ref 1.7–7.7)
NEUTROPHILS NFR BLD: 62.5 %
NITRITE UR QL STRIP.AUTO: NEGATIVE
OSMOLALITY UR: 343 MOSM/KG (ref 390–1070)
PH UR STRIP.AUTO: 7 [PH] (ref 5–8)
PLATELET # BLD AUTO: 243 K/UL (ref 135–450)
PMV BLD AUTO: 8.3 FL (ref 5–10.5)
POTASSIUM SERPL-SCNC: 5.2 MMOL/L (ref 3.5–5.1)
PROT SERPL-MCNC: 7.6 G/DL (ref 6.4–8.2)
PROT UR STRIP.AUTO-MCNC: NEGATIVE MG/DL
RBC # BLD AUTO: 4.03 M/UL (ref 4–5.2)
SARS-COV-2 RNA RESP QL NAA+PROBE: NOT DETECTED
SODIUM SERPL-SCNC: 136 MMOL/L (ref 136–145)
SODIUM UR-SCNC: 95 MMOL/L
SP GR UR STRIP.AUTO: 1.01 (ref 1–1.03)
TROPONIN, HIGH SENSITIVITY: 42 NG/L (ref 0–14)
TROPONIN, HIGH SENSITIVITY: 48 NG/L (ref 0–14)
TROPONIN, HIGH SENSITIVITY: 49 NG/L (ref 0–14)
UA COMPLETE W REFLEX CULTURE PNL UR: NORMAL
UA DIPSTICK W REFLEX MICRO PNL UR: NORMAL
URN SPEC COLLECT METH UR: NORMAL
UROBILINOGEN UR STRIP-ACNC: 0.2 E.U./DL
UUN UR-MCNC: 187 MG/DL (ref 800–1666)
WBC # BLD AUTO: 7.9 K/UL (ref 4–11)

## 2023-12-15 PROCEDURE — 36415 COLL VENOUS BLD VENIPUNCTURE: CPT

## 2023-12-15 PROCEDURE — 87636 SARSCOV2 & INF A&B AMP PRB: CPT

## 2023-12-15 PROCEDURE — 85025 COMPLETE CBC W/AUTO DIFF WBC: CPT

## 2023-12-15 PROCEDURE — 2700000000 HC OXYGEN THERAPY PER DAY

## 2023-12-15 PROCEDURE — 84540 ASSAY OF URINE/UREA-N: CPT

## 2023-12-15 PROCEDURE — 70450 CT HEAD/BRAIN W/O DYE: CPT

## 2023-12-15 PROCEDURE — 2580000003 HC RX 258: Performed by: PHYSICIAN ASSISTANT

## 2023-12-15 PROCEDURE — 6360000004 HC RX CONTRAST MEDICATION: Performed by: PHYSICIAN ASSISTANT

## 2023-12-15 PROCEDURE — 93005 ELECTROCARDIOGRAM TRACING: CPT | Performed by: PHYSICIAN ASSISTANT

## 2023-12-15 PROCEDURE — 80053 COMPREHEN METABOLIC PANEL: CPT

## 2023-12-15 PROCEDURE — 96360 HYDRATION IV INFUSION INIT: CPT

## 2023-12-15 PROCEDURE — 2580000003 HC RX 258: Performed by: INTERNAL MEDICINE

## 2023-12-15 PROCEDURE — 84300 ASSAY OF URINE SODIUM: CPT

## 2023-12-15 PROCEDURE — 94761 N-INVAS EAR/PLS OXIMETRY MLT: CPT

## 2023-12-15 PROCEDURE — 84484 ASSAY OF TROPONIN QUANT: CPT

## 2023-12-15 PROCEDURE — 6370000000 HC RX 637 (ALT 250 FOR IP): Performed by: INTERNAL MEDICINE

## 2023-12-15 PROCEDURE — 81003 URINALYSIS AUTO W/O SCOPE: CPT

## 2023-12-15 PROCEDURE — 1200000000 HC SEMI PRIVATE

## 2023-12-15 PROCEDURE — 71045 X-RAY EXAM CHEST 1 VIEW: CPT

## 2023-12-15 PROCEDURE — 99285 EMERGENCY DEPT VISIT HI MDM: CPT

## 2023-12-15 PROCEDURE — 83735 ASSAY OF MAGNESIUM: CPT

## 2023-12-15 PROCEDURE — 82436 ASSAY OF URINE CHLORIDE: CPT

## 2023-12-15 PROCEDURE — 83935 ASSAY OF URINE OSMOLALITY: CPT

## 2023-12-15 RX ORDER — SODIUM CHLORIDE 0.9 % (FLUSH) 0.9 %
5-40 SYRINGE (ML) INJECTION PRN
Status: DISCONTINUED | OUTPATIENT
Start: 2023-12-15 | End: 2023-12-22 | Stop reason: HOSPADM

## 2023-12-15 RX ORDER — ALBUTEROL SULFATE 2.5 MG/3ML
2.5 SOLUTION RESPIRATORY (INHALATION) EVERY 4 HOURS PRN
Status: DISCONTINUED | OUTPATIENT
Start: 2023-12-15 | End: 2023-12-22 | Stop reason: HOSPADM

## 2023-12-15 RX ORDER — SODIUM CHLORIDE 0.9 % (FLUSH) 0.9 %
5-40 SYRINGE (ML) INJECTION EVERY 12 HOURS SCHEDULED
Status: DISCONTINUED | OUTPATIENT
Start: 2023-12-15 | End: 2023-12-22 | Stop reason: HOSPADM

## 2023-12-15 RX ORDER — FLUTICASONE PROPIONATE 50 MCG
1 SPRAY, SUSPENSION (ML) NASAL DAILY PRN
Status: DISCONTINUED | OUTPATIENT
Start: 2023-12-15 | End: 2023-12-22 | Stop reason: HOSPADM

## 2023-12-15 RX ORDER — HEPARIN SODIUM 5000 [USP'U]/ML
5000 INJECTION, SOLUTION INTRAVENOUS; SUBCUTANEOUS 2 TIMES DAILY
Status: DISCONTINUED | OUTPATIENT
Start: 2023-12-15 | End: 2023-12-22 | Stop reason: HOSPADM

## 2023-12-15 RX ORDER — PANTOPRAZOLE SODIUM 40 MG/1
40 TABLET, DELAYED RELEASE ORAL DAILY
Status: DISCONTINUED | OUTPATIENT
Start: 2023-12-16 | End: 2023-12-22 | Stop reason: HOSPADM

## 2023-12-15 RX ORDER — POLYETHYLENE GLYCOL 3350 17 G/17G
17 POWDER, FOR SOLUTION ORAL DAILY PRN
Status: DISCONTINUED | OUTPATIENT
Start: 2023-12-15 | End: 2023-12-22 | Stop reason: HOSPADM

## 2023-12-15 RX ORDER — MAGNESIUM SULFATE IN WATER 40 MG/ML
2000 INJECTION, SOLUTION INTRAVENOUS PRN
Status: DISCONTINUED | OUTPATIENT
Start: 2023-12-15 | End: 2023-12-22 | Stop reason: HOSPADM

## 2023-12-15 RX ORDER — MECOBALAMIN 5000 MCG
5 TABLET,DISINTEGRATING ORAL NIGHTLY
Status: DISCONTINUED | OUTPATIENT
Start: 2023-12-15 | End: 2023-12-22 | Stop reason: HOSPADM

## 2023-12-15 RX ORDER — BUSPIRONE HYDROCHLORIDE 5 MG/1
5 TABLET ORAL EVERY 8 HOURS PRN
Status: DISCONTINUED | OUTPATIENT
Start: 2023-12-15 | End: 2023-12-22

## 2023-12-15 RX ORDER — SODIUM CHLORIDE 9 MG/ML
INJECTION, SOLUTION INTRAVENOUS PRN
Status: DISCONTINUED | OUTPATIENT
Start: 2023-12-15 | End: 2023-12-22 | Stop reason: HOSPADM

## 2023-12-15 RX ORDER — ROFLUMILAST 500 UG/1
500 TABLET ORAL DAILY
Status: DISCONTINUED | OUTPATIENT
Start: 2023-12-16 | End: 2023-12-22 | Stop reason: HOSPADM

## 2023-12-15 RX ORDER — 0.9 % SODIUM CHLORIDE 0.9 %
500 INTRAVENOUS SOLUTION INTRAVENOUS ONCE
Status: COMPLETED | OUTPATIENT
Start: 2023-12-15 | End: 2023-12-15

## 2023-12-15 RX ORDER — ASPIRIN 81 MG/1
81 TABLET, CHEWABLE ORAL DAILY
Status: DISCONTINUED | OUTPATIENT
Start: 2023-12-16 | End: 2023-12-22 | Stop reason: HOSPADM

## 2023-12-15 RX ORDER — BUDESONIDE AND FORMOTEROL FUMARATE DIHYDRATE 160; 4.5 UG/1; UG/1
2 AEROSOL RESPIRATORY (INHALATION)
Status: DISCONTINUED | OUTPATIENT
Start: 2023-12-15 | End: 2023-12-22 | Stop reason: HOSPADM

## 2023-12-15 RX ORDER — ATORVASTATIN CALCIUM 40 MG/1
40 TABLET, FILM COATED ORAL NIGHTLY
Status: DISCONTINUED | OUTPATIENT
Start: 2023-12-15 | End: 2023-12-22 | Stop reason: HOSPADM

## 2023-12-15 RX ORDER — SODIUM CHLORIDE, SODIUM LACTATE, POTASSIUM CHLORIDE, CALCIUM CHLORIDE 600; 310; 30; 20 MG/100ML; MG/100ML; MG/100ML; MG/100ML
INJECTION, SOLUTION INTRAVENOUS CONTINUOUS
Status: DISCONTINUED | OUTPATIENT
Start: 2023-12-15 | End: 2023-12-16 | Stop reason: SDUPTHER

## 2023-12-15 RX ADMIN — Medication 5 MG: at 23:22

## 2023-12-15 RX ADMIN — ATORVASTATIN CALCIUM 40 MG: 40 TABLET, FILM COATED ORAL at 23:23

## 2023-12-15 RX ADMIN — METOPROLOL TARTRATE 25 MG: 25 TABLET, FILM COATED ORAL at 23:23

## 2023-12-15 RX ADMIN — IOPAMIDOL 70 ML: 755 INJECTION, SOLUTION INTRAVENOUS at 21:15

## 2023-12-15 RX ADMIN — SODIUM CHLORIDE 500 ML: 9 INJECTION, SOLUTION INTRAVENOUS at 17:30

## 2023-12-15 RX ADMIN — SODIUM CHLORIDE, POTASSIUM CHLORIDE, SODIUM LACTATE AND CALCIUM CHLORIDE: 600; 310; 30; 20 INJECTION, SOLUTION INTRAVENOUS at 23:18

## 2023-12-15 NOTE — ED NOTES
1722-12 Lead ECG completed and given to Dr. Randal Campuzano for review.       Jovanni Zuniga  12/15/23 5227

## 2023-12-15 NOTE — ED PROVIDER NOTES
[facility] ED  EMERGENCY DEPARTMENT ENCOUNTER        Pt Name: Terri Smith  MRN: [id number]  Birthdate 1940  Date of evaluation: 12/15/2023  Provider: Mabel Ball PA-C  PCP: Jackson Ontiveros MD  Note Started: 5:08 PM EST 12/15/23       I have seen and evaluated this patient with my supervising physician, Dr. Henderson.    CHIEF COMPLAINT       Chief Complaint   Patient presents with    Dizziness     Pt to ED per EMS from home with complaint of vertigo x 1 week. Per EMS, pt thinks she might have an inner ear infection. Pt states she just finished a course of abx. Pt states she wears 2L NC at baseline.        HISTORY OF PRESENT ILLNESS: 1 or more Elements     History From: Paramedics and patient            Chief Complaint: Dizziness    Terri Smith is a 83 y.o. female who presents with dizziness headache neck pain.  She states this has been ongoing since Sunday.  She thought she had an ear infection because her right ear felt clogged and was draining and she cannot hear out of her left ear.  She called her PCP who prescribed her cefdinir over the phone.  She is taking this without relief.  She felt dizzy before she started the antibiotics.  She describes this as \"really dizzy \"when I move my head or stand up.  She tells me she thought the chair was moving.  She admits to nausea and \"vomiting phlegm \".  She denies congestion fever body aches cough chest pain shortness of breath abdominal pain.  She has had increased urinary frequency recently.  She has had no falls.  She denies history of stroke.  She denies history of being on a blood thinner.  She denies unilateral weakness or numbness.  She has severe blurry vision bilaterally at baseline due to cataracts.  She denies any new vision changes.    Nursing Notes were all reviewed and agreed with or any disagreements were addressed in the HPI.    REVIEW OF SYSTEMS :      Review of Systems    Positives and Pertinent negatives as  (*)     Glucose 103 (*)     BUN 32 (*)     Creatinine 2.0 (*)     Est, Glom Filt Rate 24 (*)     Albumin/Globulin Ratio 1.0 (*)     ALT 6 (*)     AST 14 (*)     All other components within normal limits   TROPONIN - Abnormal; Notable for the following components:    Troponin, High Sensitivity 48 (*)     All other components within normal limits   TROPONIN - Abnormal; Notable for the following components:    Troponin, High Sensitivity 42 (*)     All other components within normal limits   COVID-19 & INFLUENZA COMBO   C DIFF TOXIN/ANTIGEN   GASTROINTESTINAL PANEL, MOLECULAR   CBC WITH AUTO DIFFERENTIAL   URINALYSIS WITH REFLEX TO CULTURE   OSMOLALITY, URINE   SODIUM, URINE, RANDOM   CHLORIDE, URINE, RANDOM   UREA NITROGEN, URINE   TROPONIN       When ordered only abnormal lab results are displayed. All other labs were within normal range or not returned as of this dictation.    EKG: When ordered, EKG's are interpreted by the Emergency Department Physician in the absence of a cardiologist.  Please see their note for interpretation of EKG.    Per my interpretation sinus rhythm with premature supraventricular complexes, no T wave changes, no ST changes.  Some motion artifact.    Patient was placed on cardiac monitor to assess for dizziness and remains sinus rhythm in the 80s.    RADIOLOGY:   Non-plain film images such as CT, Ultrasound and MRI are read by the radiologist. Plain radiographic images are visualized and preliminarily interpreted by the ED Provider with the below findings:    Per my interpretation no acute consolidation consistent with pneumonia on x-ray    Interpretation per the Radiologist below, if available at the time of this note:    XR CHEST PORTABLE   Final Result   No acute cardiopulmonary disease.  COPD.         CT Head W/O Contrast    (Results Pending)   CTA Head Neck W/Contrast    (Results Pending)     No results found.     No results found.    PROCEDURES   Unless otherwise noted below, none    DISPOSITION/PLAN     DISPOSITION Admitted 12/15/2023 08:50:53 PM      PATIENT REFERRED TO:  No follow-up provider specified.     DISCHARGE MEDICATIONS:  New Prescriptions    No medications on file       DISCONTINUED MEDICATIONS:  Discontinued Medications    No medications on file              (Please note that portions of this note were completed with a voice recognition program.  Efforts were made to edit the dictations but occasionally words are mis-transcribed.)    Bishop Simons PA-C (electronically signed)       Bishop Simons PA-C  12/15/23 3809       Bishop Simons PA-C  12/15/23 1242

## 2023-12-16 LAB
EKG ATRIAL RATE: 75 BPM
EKG DIAGNOSIS: NORMAL
EKG P AXIS: 100 DEGREES
EKG P-R INTERVAL: 168 MS
EKG Q-T INTERVAL: 392 MS
EKG QRS DURATION: 84 MS
EKG QTC CALCULATION (BAZETT): 460 MS
EKG R AXIS: -71 DEGREES
EKG T AXIS: 88 DEGREES
EKG VENTRICULAR RATE: 83 BPM

## 2023-12-16 PROCEDURE — 6370000000 HC RX 637 (ALT 250 FOR IP): Performed by: INTERNAL MEDICINE

## 2023-12-16 PROCEDURE — 94640 AIRWAY INHALATION TREATMENT: CPT

## 2023-12-16 PROCEDURE — 94761 N-INVAS EAR/PLS OXIMETRY MLT: CPT

## 2023-12-16 PROCEDURE — 6360000002 HC RX W HCPCS: Performed by: INTERNAL MEDICINE

## 2023-12-16 PROCEDURE — 2580000003 HC RX 258: Performed by: INTERNAL MEDICINE

## 2023-12-16 PROCEDURE — 1200000000 HC SEMI PRIVATE

## 2023-12-16 PROCEDURE — 2700000000 HC OXYGEN THERAPY PER DAY

## 2023-12-16 PROCEDURE — 93010 ELECTROCARDIOGRAM REPORT: CPT | Performed by: INTERNAL MEDICINE

## 2023-12-16 RX ORDER — MECLIZINE HCL 25MG 25 MG/1
25 TABLET, CHEWABLE ORAL 3 TIMES DAILY PRN
Status: DISCONTINUED | OUTPATIENT
Start: 2023-12-16 | End: 2023-12-17 | Stop reason: CLARIF

## 2023-12-16 RX ORDER — ONDANSETRON 2 MG/ML
4 INJECTION INTRAMUSCULAR; INTRAVENOUS EVERY 6 HOURS PRN
Status: DISCONTINUED | OUTPATIENT
Start: 2023-12-16 | End: 2023-12-22 | Stop reason: HOSPADM

## 2023-12-16 RX ORDER — SODIUM CHLORIDE, SODIUM LACTATE, POTASSIUM CHLORIDE, CALCIUM CHLORIDE 600; 310; 30; 20 MG/100ML; MG/100ML; MG/100ML; MG/100ML
INJECTION, SOLUTION INTRAVENOUS CONTINUOUS
Status: DISCONTINUED | OUTPATIENT
Start: 2023-12-16 | End: 2023-12-19

## 2023-12-16 RX ADMIN — ONDANSETRON 4 MG: 2 INJECTION INTRAMUSCULAR; INTRAVENOUS at 18:35

## 2023-12-16 RX ADMIN — BUSPIRONE HYDROCHLORIDE 5 MG: 5 TABLET ORAL at 20:20

## 2023-12-16 RX ADMIN — SODIUM CHLORIDE, POTASSIUM CHLORIDE, SODIUM LACTATE AND CALCIUM CHLORIDE: 600; 310; 30; 20 INJECTION, SOLUTION INTRAVENOUS at 06:20

## 2023-12-16 RX ADMIN — SODIUM CHLORIDE, PRESERVATIVE FREE 10 ML: 5 INJECTION INTRAVENOUS at 10:39

## 2023-12-16 RX ADMIN — MECLIZINE HYDROCHLORIDE 25 MG: 25 TABLET, CHEWABLE ORAL at 18:54

## 2023-12-16 RX ADMIN — Medication 2 PUFF: at 20:31

## 2023-12-16 RX ADMIN — Medication 5 MG: at 20:20

## 2023-12-16 RX ADMIN — TIOTROPIUM BROMIDE INHALATION SPRAY 2 PUFF: 3.12 SPRAY, METERED RESPIRATORY (INHALATION) at 07:22

## 2023-12-16 RX ADMIN — SODIUM CHLORIDE, PRESERVATIVE FREE 10 ML: 5 INJECTION INTRAVENOUS at 21:07

## 2023-12-16 RX ADMIN — HEPARIN SODIUM 5000 UNITS: 5000 INJECTION INTRAVENOUS; SUBCUTANEOUS at 20:20

## 2023-12-16 RX ADMIN — ROFLUMILAST 500 MCG: 500 TABLET ORAL at 10:01

## 2023-12-16 RX ADMIN — ASPIRIN 81 MG: 81 TABLET, CHEWABLE ORAL at 10:01

## 2023-12-16 RX ADMIN — SODIUM CHLORIDE, POTASSIUM CHLORIDE, SODIUM LACTATE AND CALCIUM CHLORIDE: 600; 310; 30; 20 INJECTION, SOLUTION INTRAVENOUS at 10:18

## 2023-12-16 RX ADMIN — HEPARIN SODIUM 5000 UNITS: 5000 INJECTION INTRAVENOUS; SUBCUTANEOUS at 10:04

## 2023-12-16 RX ADMIN — Medication 2 PUFF: at 07:22

## 2023-12-16 RX ADMIN — ONDANSETRON 4 MG: 2 INJECTION INTRAMUSCULAR; INTRAVENOUS at 06:18

## 2023-12-16 RX ADMIN — SERTRALINE 150 MG: 100 TABLET, FILM COATED ORAL at 10:01

## 2023-12-16 RX ADMIN — PANTOPRAZOLE SODIUM 40 MG: 40 TABLET, DELAYED RELEASE ORAL at 10:00

## 2023-12-16 RX ADMIN — ATORVASTATIN CALCIUM 40 MG: 40 TABLET, FILM COATED ORAL at 20:20

## 2023-12-16 NOTE — H&P
V2.0  History and Physical      Name:  Cindy Calloway /Age/Sex: 1940  (81 y.o. female)   MRN & CSN:  [id number] & [id number] Encounter Date/Time: 12/15/2023 8:35 PM EST   Location:   PCP: Ronald Peña MD       Hospital Day: 1    Assessment and Plan:   Cindy Calloway is a 81 y.o. female with a pmh of hypertension coronary disease and A-fib who presents with <principal problem not specified>        Plan:  TIA  -Risk factor include; age hypertension ,CAD and A-fib  -Off Xarelto because of the visit of GI bleed and anemia  -CT head without contrast:Moderate cortical atrophy and mild chronic microischemic changes scattered in, the deep white matter with no acute intracranial abnormality seen  -CTA: Pending  -Admit to telemetry  -Neurocheck every 4  -Aspirin and statin  -Neurology consult  -Lipid profile , A1c  -Last echo 6 months ago normal ejection fraction    NADJA  -Likely prerenal due to fluid restriction and diarrhea  -Maintain hemodynamic  -Avoid nephrotoxic drugs  Antibiotics associated diarrhea  -C. difficile and pertinent stool study ordered  -Judicious hydration    Tropenemia   -CAD s/p 2V CABG  (LIMA to LAD, SVG to Diagonal   -Trend troponin  -Aspirin statin hold beta-blocker because of hypotension  Paroxysmal A-fib  [id number] Score for Atrial Fibrillation Stroke Risk   Risk   Factors  Component Value   C CHF Yes 1   H HTN Yes 1   A2 Age >= 76 Yes,  (81 y.o.) 2   D DM No 0   S2 Prior Stroke/TIA No 0   V Vascular Disease Yes 1   A Age 77-78 No,  (81 y.o.) 0   Sc Sex female 1    [id number]  Score  6   -Off Xarelto because of the visit of GI bleed and anemia    Disposition:   Current Living situation: Home with family  Expected Disposition: Home with family  Estimated D/C: 3 days    Diet No diet orders on file   DVT Prophylaxis [] Lovenox, [x]  Heparin, [] SCDs, [] Ambulation,  [] Eliquis, [] Xarelto, [] Coumadin   Code Status Prior   Surrogate Decision Maker/ POA Self   fluticasone (FLONASE) 50 MCG/ACT nasal spray 1 spray by Each Nostril route daily as needed for Rhinitis 21   Aubrey Mejia MD   ferrous sulfate (IRON 325) 325 (65 Fe) MG tablet Take 1 tablet by mouth daily (with breakfast)    ProviderCharmaine MD   acetaminophen (TYLENOL) 500 MG tablet Take 1 tablet by mouth every 6 hours as needed for Pain    Provider, MD Charmaine       Physical Exam:         General: NAD  Eyes: EOMI  ENT: neck supple  Cardiovascular: Regular rate.  Respiratory: Clear to auscultation  Gastrointestinal: Soft, non tender  Genitourinary: no suprapubic tenderness  Musculoskeletal: No edema  Skin: warm, dry  Neuro: Alert.  Psych: Mood appropriate.       Past Medical History:   PMHx   Past Medical History:   Diagnosis Date    NADJA (acute kidney injury) ([facility])     Asthma     CAD (coronary artery disease)     CHF (congestive heart failure) ([facility])     unsure    Chronic anxiety     COPD (chronic obstructive pulmonary disease) ([facility])     GERD (gastroesophageal reflux disease) 2021    Heart attack ([facility]) 2019    History of blood transfusion     Hyperlipidemia     Hypertension     MRSA (methicillin resistant staph aureus) culture positive 2019    + resp cx    OA (osteoarthritis) 2014    Thyroid disease      PSHX:  has a past surgical history that includes  section; Mastectomy, partial (Left); bronchoscopy (2019); Cardiac catheterization (2019); Coronary artery bypass graft (N/A, 2019); Colonoscopy (N/A, 2020); Sigmoidoscopy (2020); sigmoidoscopy (N/A, 2020); IR MIDLINE CATH (2021); Colonoscopy (N/A, 10/22/2021); Upper gastrointestinal endoscopy (N/A, 2023); Upper gastrointestinal endoscopy (N/A, 2023); Upper gastrointestinal endoscopy (2023); Colonoscopy (N/A, 2023); and sigmoidoscopy (N/A, 2023).  Allergies:   Allergies   Allergen Reactions    Latex Other (See Comments)     Burning    Other reaction(s):

## 2023-12-16 NOTE — PROGRESS NOTES
Perfect serve sent to Dr Millie Denton to clarify if should get CTA head or neck with contrast due to GFR 24-received order back to dc CTA -notified CT

## 2023-12-16 NOTE — PROGRESS NOTES
Hand off report given to [facility], RN. Patient is stable showing no signs of distress and has no current needs at this time. Call light is in reach and bed is in lowest position. Care is transferred at this time.

## 2023-12-16 NOTE — PROGRESS NOTES
Bianka Aguilar (ICU Staff) tried  to insert an IV Cannula for the contrast and she tried to talk to the CT Scan Staff if they can do it using the cannula she inserted. CT Scan staff came and checked th IV cannula but she informed me that they need a new  IV line for CT scan with contrast. They cannot use the IV cannula Ma'eliel Dee inserted.

## 2023-12-16 NOTE — PROGRESS NOTES
Patient admitted to room 308 from ED. Patient oriented to room, call light, bed rails, phone, lights and bathroom. Patient instructed about the schedule of the day including: vital sign frequency, lab draws, possible tests, frequency of MD and staff rounds, daily weights, I &O's and prescribed diet. Telemetry box in place, patient aware of placement and reason. Bed locked, in lowest position, side rails up 2/4, call light within reach. Recliner Assessment  Patient is able to demonstrate the ability to move from a reclining position to an upright position within the recliner. 4 Eyes Skin Assessment     The patient is being assess for   Admission    I agree that 2 RN's have performed a thorough Head to Toe Skin Assessment on the patient. ALL assessment sites listed below have been assessed. Areas assessed for pressure by both nurses:   [x]   Head, Face, and Ears   [x]   Shoulders, Back, and Chest, Abdomen  [x]   Arms, Elbows, and Hands   [x]   Coccyx, Sacrum, and Ischium  [x]   Legs, Feet, and Heels  Scattered bruising      Skin Assessed Under all Medical Devices by both nurses:  O2 device tubing              All Mepilex Borders were peeled back and area peeked at by both nurses:  No: NA  Please list where Mepilex Borders are located:               **SHARE this note so that the co-signing nurse is able to place an eSignature**    Co-signer eSignature: Electronically signed by Srinivas Blood RN on 12/16/23 at 3:57 AM EST    Does the Patient have Skin Breakdown related to pressure?   No              Trey Prevention initiated:  No   Wound Care Orders initiated:  No      [facility] nurse consulted for Pressure Injury (Stage 3,4, Unstageable, DTI, NWPT, Complex wounds)and New or Established Ostomies:  No      Primary Nurse eSignature: Electronically signed by Mary Hubbard RN on 12/16/23 at 3:56 AM EST

## 2023-12-16 NOTE — PROGRESS NOTES
Telemetry Assignment Communication Form    Patient has orders for continuous telemetry OR pulse oximeter only orders    To be filled out by Clinical    Patient has Admission or Transfer orders and is assigned to Room Number: [facility]      (Once this top section is completed:  Select \"Route\" and send note to Fax number: [phone]))      ___________________________________________________________________________      To be filled out by [facility]    Patient assigned to tele box number: __________________               (to be written in by [facility] when telemetry box is assigned to patient)    ___________________________________________________________________________      Bedside RN confirming that the box listed above is in fact the telemetry box number being placed on the patient listed above.         X________________________________________ RN signature        __________________________________________RN assigned to Patient (please print)    _______________ Date    ____________ Time

## 2023-12-16 NOTE — FLOWSHEET NOTE
12/16/23 0945   Vital Signs   Pulse 62   Heart Rate Source Monitor   Respirations 18   BP (!) 96/56   MAP (Calculated) 69   BP Location Right upper arm   BP Method Automatic     Patient assessment complete pt c/o dizziness when sitting up plan of care discussed. Pt states still with feeling of numbness around right eye and side of face and difficulty hearing out of right ear,Call light in reach. Pt nauseous when sits up quickly

## 2023-12-16 NOTE — H&P
Spoke with Mona (RN), about whether CTA Head and Neck is still needed. Waiting on MD to confirm due to patient's renal function.

## 2023-12-17 PROCEDURE — 97530 THERAPEUTIC ACTIVITIES: CPT

## 2023-12-17 PROCEDURE — 97165 OT EVAL LOW COMPLEX 30 MIN: CPT

## 2023-12-17 PROCEDURE — 94640 AIRWAY INHALATION TREATMENT: CPT

## 2023-12-17 PROCEDURE — 97112 NEUROMUSCULAR REEDUCATION: CPT

## 2023-12-17 PROCEDURE — 6370000000 HC RX 637 (ALT 250 FOR IP): Performed by: INTERNAL MEDICINE

## 2023-12-17 PROCEDURE — 6370000000 HC RX 637 (ALT 250 FOR IP)

## 2023-12-17 PROCEDURE — 2700000000 HC OXYGEN THERAPY PER DAY

## 2023-12-17 PROCEDURE — 1200000000 HC SEMI PRIVATE

## 2023-12-17 PROCEDURE — 2580000003 HC RX 258: Performed by: INTERNAL MEDICINE

## 2023-12-17 PROCEDURE — 94761 N-INVAS EAR/PLS OXIMETRY MLT: CPT

## 2023-12-17 PROCEDURE — 6360000002 HC RX W HCPCS: Performed by: INTERNAL MEDICINE

## 2023-12-17 PROCEDURE — 97161 PT EVAL LOW COMPLEX 20 MIN: CPT

## 2023-12-17 RX ORDER — ACETAMINOPHEN 500 MG
TABLET ORAL
Status: COMPLETED
Start: 2023-12-17 | End: 2023-12-17

## 2023-12-17 RX ORDER — ACETAMINOPHEN 500 MG
500 TABLET ORAL EVERY 6 HOURS PRN
Status: DISCONTINUED | OUTPATIENT
Start: 2023-12-17 | End: 2023-12-22 | Stop reason: HOSPADM

## 2023-12-17 RX ORDER — MECLIZINE HYDROCHLORIDE 25 MG/1
25 TABLET ORAL 3 TIMES DAILY PRN
Status: DISCONTINUED | OUTPATIENT
Start: 2023-12-17 | End: 2023-12-22 | Stop reason: HOSPADM

## 2023-12-17 RX ADMIN — BUSPIRONE HYDROCHLORIDE 5 MG: 5 TABLET ORAL at 14:03

## 2023-12-17 RX ADMIN — Medication 2 PUFF: at 20:09

## 2023-12-17 RX ADMIN — Medication 5 MG: at 20:34

## 2023-12-17 RX ADMIN — Medication 2 PUFF: at 07:20

## 2023-12-17 RX ADMIN — ATORVASTATIN CALCIUM 40 MG: 40 TABLET, FILM COATED ORAL at 20:34

## 2023-12-17 RX ADMIN — ASPIRIN 81 MG: 81 TABLET, CHEWABLE ORAL at 11:07

## 2023-12-17 RX ADMIN — SODIUM CHLORIDE, PRESERVATIVE FREE 10 ML: 5 INJECTION INTRAVENOUS at 16:17

## 2023-12-17 RX ADMIN — SODIUM CHLORIDE, POTASSIUM CHLORIDE, SODIUM LACTATE AND CALCIUM CHLORIDE: 600; 310; 30; 20 INJECTION, SOLUTION INTRAVENOUS at 02:46

## 2023-12-17 RX ADMIN — TIOTROPIUM BROMIDE INHALATION SPRAY 2 PUFF: 3.12 SPRAY, METERED RESPIRATORY (INHALATION) at 07:20

## 2023-12-17 RX ADMIN — SERTRALINE 150 MG: 100 TABLET, FILM COATED ORAL at 11:08

## 2023-12-17 RX ADMIN — ROFLUMILAST 500 MCG: 500 TABLET ORAL at 11:08

## 2023-12-17 RX ADMIN — MECLIZINE HYDROCHLORIDE 25 MG: 25 TABLET ORAL at 08:57

## 2023-12-17 RX ADMIN — ACETAMINOPHEN: 500 TABLET ORAL at 14:28

## 2023-12-17 RX ADMIN — HEPARIN SODIUM 5000 UNITS: 5000 INJECTION INTRAVENOUS; SUBCUTANEOUS at 20:34

## 2023-12-17 RX ADMIN — PANTOPRAZOLE SODIUM 40 MG: 40 TABLET, DELAYED RELEASE ORAL at 11:08

## 2023-12-17 RX ADMIN — ONDANSETRON 4 MG: 2 INJECTION INTRAMUSCULAR; INTRAVENOUS at 11:07

## 2023-12-17 ASSESSMENT — PAIN DESCRIPTION - LOCATION: LOCATION: HEAD

## 2023-12-17 ASSESSMENT — PAIN SCALES - GENERAL: PAINLEVEL_OUTOF10: 5

## 2023-12-17 NOTE — PLAN OF CARE
Problem: Discharge Planning  Goal: Discharge to home or other facility with appropriate resources  12/17/2023 0908 by Dain Paniagua RN  Outcome: Progressing  12/16/2023 2156 by Niharkia Tom RN  Outcome: Progressing     Problem: Safety - Adult  Goal: Free from fall injury  12/17/2023 0908 by Dain Paniagua RN  Outcome: Progressing  12/16/2023 2156 by Niharika Tom RN  Outcome: Progressing     Problem: Chronic Conditions and Co-morbidities  Goal: Patient's chronic conditions and co-morbidity symptoms are monitored and maintained or improved  12/17/2023 0908 by Dain Paniagua RN  Outcome: Progressing  12/16/2023 2156 by Niharika Tom RN  Outcome: Progressing     Problem: Skin/Tissue Integrity  Goal: Absence of new skin breakdown  Description: 1. Monitor for areas of redness and/or skin breakdown  2. Assess vascular access sites hourly  3. Every 4-6 hours minimum:  Change oxygen saturation probe site  4. Every 4-6 hours:  If on nasal continuous positive airway pressure, respiratory therapy assess nares and determine need for appliance change or resting period.   12/17/2023 0908 by Dain Paniagua RN  Outcome: Progressing  12/16/2023 2156 by Niharika Tom RN  Outcome: Progressing

## 2023-12-17 NOTE — PROGRESS NOTES
Hand off report given to [facility], RN. Patient is stable showing no signs of distress and has no current needs at this time. Call light is in reach and bed is in lowest position. Care is transferred at this time.

## 2023-12-17 NOTE — PROGRESS NOTES
Pt transferred to floor from PCU. She is alert and oriented and accepting of care. O2 in place at 2L per baseline. Call bell in reach. Pt reports no pain at this time. Has some SOB upon exertion. Side rails up. Pt provided with food and water. Devonda Amita in place as pt reports still being dizzy when moving. Ivs intact and fluids infusing. No further concerns at this time. Bedside Mobility Assessment Tool (BMAT):     Assessment Level 1- Sit and Shake    1. From a semi-reclined position, ask patient to sit up and rotate to a seated position at the side of the bed. Can use the bedrail. 2. Ask patient to reach out and grab your hand and shake making sure patient reaches across his/her midline. Pass- Patient is able to come to a seated position, maintain core strength. Maintains seated balance while reaching across midline. Move on to Assessment Level 2. Assessment Level 2- Stretch and Point   1. With patient in seated position at the side of the bed, have patient place both feet on the floor (or stool) with knees no higher than hips. 2. Ask patient to stretch one leg and straighten the knee, then bend the ankle/flex and point the toes. If appropriate, repeat with the other leg. Pass- Patient is able to demonstrate appropriate quad strength on intended weight bearing limb(s). Move onto Assessment Level 3. Assessment Level 3- Stand   1. Ask patient to elevate off the bed or chair (seated to standing) using an assistive device (cane, bedrail). 2. Patient should be able to raise buttocks off be and hold for a count of five. May repeat once. Pass- Patient maintains standing stability for at least 5 seconds, proceed to assessment level 4. Assessment Level 4- Walk   1. Ask patient to march in place at bedside. 2. Then ask patient to advance step and return each foot. Some medical conditions may render a patient from stepping backwards, use your best clinical judgement.    Fail- Patient not able to

## 2023-12-17 NOTE — CARE COORDINATION
Case Management Assessment  Initial Evaluation    Date/Time of Evaluation: 12/17/2023 8:28 AM  Assessment Completed by: Kranthi Iverson RN    If patient is discharged prior to next notation, then this note serves as note for discharge by case management. Patient Name: Jaquelin Wiggins                   YOB: 1940  Diagnosis: Dizziness [R42]  NADJA (acute kidney injury) [facility]) [N17.9]                   Date / Time: 12/15/2023  4:31 PM    Patient Admission Status: Inpatient   Readmission Risk (Low < 19, Mod (19-27), High > 27): Readmission Risk Score: 21.4    Current PCP: Brian Patel MD  PCP verified by CM? Yes ([facility])    Chart Reviewed: Yes      History Provided by: Patient  Patient Orientation: Alert and Oriented    Patient Cognition: Alert    Hospitalization in the last 30 days (Readmission):  No    If yes, Readmission Assessment in CM Navigator will be completed. Advance Directives:      Code Status: Full Code   Patient's Primary Decision Maker is: Legal Next of Kin    Primary Decision MakerDomPorterville Developmental Centerlilli  Edgar - [phone]    Secondary Decision Maker: Hafsa Tapia - Niece/Nephew - [phone]    Discharge Planning:    Patient lives with: Family Members (son stays at night, caregiver from 9-6) Type of Home: House  Primary Care Giver: Self  Patient Support Systems include: Children, Family Members   Current Financial resources: Medicare  Current community resources: None  Current services prior to admission: C-pap, Durable Medical Equipment, Oxygen Therapy ([facility])            Current DME: Arau[employer] Flatter, Wheelchair, Shower Chair            Type of Home Care services:  None    ADLS  Prior functional level: Assistance with the following:, Cooking, Housework, Shopping, Mobility  Current functional level: Assistance with the following:, Cooking, Housework, Shopping, Mobility    PT AM-PAC:   /24  OT AM-PAC:   /24    Family can provide assistance at DC:  Yes  Would you like Case Management to discuss the discharge plan with any other family members/significant others, and if so, who? No  Plans to Return to Present Housing: Yes  Other Identified Issues/Barriers to RETURNING to current housing: na  Potential Assistance needed at discharge: N/A            Potential DME:    Patient expects to discharge to: [address] [place] [address] for transportation at discharge:      Financial    Payor: [address] / Plan: MEDICARE PART A AND B / Product Type: *No Product type* /     Does insurance require precert for SNF: No    Potential assistance Purchasing Medications: No  Meds-to-Beds request:        Koffi Singletary [address], [address] [facility],[address]  [address]  Phone: [phone] Fax: [phone]    [facility] [id number]  [address], [place]  [address]  [address] [place] [address]  Phone: [phone] Fax: [phone]      Notes:    Factors facilitating achievement of predicted outcomes: Family support, Caregiver support, Cooperative, and Pleasant    Barriers to discharge: dizziness    Additional Case Management Notes: Reviewed chart and met with pt at bedside. Role of CM explained. Pt lives in one story home alone; however, pt's son stays at night and has caregiver 9-6 through the week. Pt asked for private caregiver list. Provided at bedside. Pt has home O2 via [facility]. Follow for potential HHC need    The Plan for Transition of Care is related to the following treatment goals of Dizziness [R42]  NADJA (acute kidney injury) ([address]) [G58.2]    IF APPLICABLE: The Patient and/or patient representative Destiny Fenton and her family were provided with a choice of provider and agrees with the discharge plan.  Freedom of choice list with basic dialogue that supports the patient's individualized plan of care/goals and shares the quality data associated with the providers was provided to:     Patient Representative Name:       The

## 2023-12-17 NOTE — PROGRESS NOTES
Ordered On Ordered By    12/16/2023  5:10 PM Rosa Merritt MD             Order Questions    Question Answer   Reason for Consult? ear pain, hearing loss, vertigo

## 2023-12-17 NOTE — PROGRESS NOTES
Inpatient Occupational Therapy Evaluation and Treatment    Unit: PCU  Date:  12/17/2023  Patient Name:    Teresita Dickson  Admitting diagnosis:  Dizziness [R42]  NADJA (acute kidney injury) [facility]) [N17.9]  Admit Date:  12/15/2023  Precautions/Restrictions/WB Status/ Lines/ Wounds/ Oxygen: Fall risk, Bed/chair alarm, and Lines (IV and Supplemental O2 (2.5L))        Pt seen for cotreatment this date due to patient safety and limited functional status information    Treatment Time:  10:10-11:06  Treatment Number:  1  Timed Code Treatment Minutes: 46 minutes  Total Treatment Minutes:  56  minutes    Patient Goals for Therapy: \"to go home \"          Discharge Recommendations: Home with 24/7 assist  and Home OT  DME needs for discharge: Needs Met       Therapy recommendations for staff:   Assist of 1 for transfers with use of rolling walker (RW) and gait belt to/from chair    History of Present Illness: Teresita Dickson is a 81 y.o. female with pmh of hypertension carotid disease A-fib coming in with 5 days of worsening  dizziness with right-sided headache and neck pain. Per her daughter started with urine infection symptoms pain tenderness and sense of dizziness patient received antibiotic as an outpatient did not much improved the dizziness patient developed loose bowel movement does not contain blood and associated with abdominal pain or cramping. Pt's dizziness is further explained as \"the room is spinning\". Pt states that her dizziness started on Sunday along with increased pain in her cervical spine, and pain and \"fullness\" within her R ear. Pt also complains of visual deficits at baseline, and auditory deficits in her L ear at baseline. In the ED initial vital heart rate 89/min blood pressure 123/74 afebrile  She is so dizzy she can barely sit up in bed.     Home Health S4 Level Recommendation:  Level 1 Standard    AM-PAC Score: AM-PAC Inpatient Daily Activity Raw Score: 19     Subjective:  Patient lying

## 2023-12-17 NOTE — ACP (ADVANCE CARE PLANNING)
Advance Care Planning     General Advance Care Planning (ACP) Conversation    Date of Conversation: 12/15/2023  Conducted with: Patient with Decision Making Capacity    Healthcare Decision Maker:    Primary Decision Maker: Silviano Richmond - Child - [phone]    Secondary Decision Maker: Estephania Echeverria - Niece/Nephew - [phone]  Click here to complete [address] including selection of the Healthcare Decision Maker Relationship (ie \"Primary\"). Today we documented Decision Maker(s) consistent with Legal Next of Kin hierarchy.     Content/Action Overview:  declined  Reviewed DNR/DNI and patient elects Full Code (Attempt Resuscitation)        Length of Voluntary ACP Conversation in minutes:  <16 minutes (Non-Billable)    Kranthi Iverson RN

## 2023-12-17 NOTE — PROGRESS NOTES
Consult has been called to Dr. Kassandra De La Vega on 12/17/23. Spoke with [employer].  10:32 AM    Alex Zayas  12/17/2023

## 2023-12-17 NOTE — PROGRESS NOTES
Inpatient Physical Therapy Evaluation & Treatment    Unit: PCU  Date:  12/17/2023  Patient Name:    Alis Durbin  Admitting diagnosis:  Dizziness [R42]  NADJA (acute kidney injury) [facility]) [N17.9]  Admit Date:  12/15/2023  Precautions/Restrictions/WB Status/ Lines/ Wounds/ Oxygen: Fall risk, Bed/chair alarm, and Lines (IV and Supplemental O2 (2.5L))      Pt seen for cotreatment this date due to patient safety and limited functional status information    Treatment Time:  10:10 - 11:15  Treatment Number:  1   Timed Code Treatment Minutes: 55 minutes  Total Treatment Minutes:  64  minutes    Patient Stated Goals for Therapy: \" to go home \"          Discharge Recommendations: Home with 24hr assistance  DME needs for discharge: Needs Met       Therapy recommendation for EMS Transport: can transport by wheelchair    Therapy recommendations for staff:   Assist of 1 for transfers with use of rolling walker (RW) and gait belt to/from [facility]  to/from chair    History of Present Illness:   Per Dr. Casarez Clubs:  Alis Durbin is a 81 y.o. female with pmh of hypertension carotid disease A-fib coming in with 5 days of worsening  dizziness with right-sided headache and neck pain. Per her daughter started with urine infection symptoms pain tenderness and sense of dizziness patient received antibiotic as an outpatient did not much improved the dizziness patient developed loose bowel movement does not contain blood and associated with abdominal pain or cramping. In the ED initial vital heart rate 89/min blood pressure 123/74 afebrile  She is so dizzy she can barely sit up in bed. \"    Pt's dizziness is further explained as \"the room is spinning\". Pt states that her dizziness started on Sunday along with increased pain in her cervical spine, and pain and \"fullness\" within her R ear. Pt has noted increased congestion and sinus pressure recently. Pt states that she has been having increased nausea and vomiting.  Pt also complains of education. Signature: Alex Red PT     If patient discharges from this facility prior to next visit, this note will serve as the Discharge Summary.

## 2023-12-18 ENCOUNTER — APPOINTMENT (OUTPATIENT)
Dept: GENERAL RADIOLOGY | Age: 83
DRG: 682 | End: 2023-12-18
Payer: MEDICARE

## 2023-12-18 PROBLEM — I95.9 HYPOTENSION: Status: ACTIVE | Noted: 2023-12-18

## 2023-12-18 PROBLEM — H91.90 HEARING LOSS: Status: ACTIVE | Noted: 2023-12-18

## 2023-12-18 PROBLEM — R42 VERTIGO: Status: ACTIVE | Noted: 2023-12-18

## 2023-12-18 LAB
ALBUMIN SERPL-MCNC: 3.3 G/DL (ref 3.4–5)
ANION GAP SERPL CALCULATED.3IONS-SCNC: 10 MMOL/L (ref 3–16)
ANION GAP SERPL CALCULATED.3IONS-SCNC: 6 MMOL/L (ref 3–16)
BASE EXCESS BLDV CALC-SCNC: -3.2 MMOL/L (ref -3–3)
BASE EXCESS BLDV CALC-SCNC: -6.3 MMOL/L (ref -3–3)
BUN SERPL-MCNC: 17 MG/DL (ref 7–20)
BUN SERPL-MCNC: 19 MG/DL (ref 7–20)
CALCIUM SERPL-MCNC: 8.4 MG/DL (ref 8.3–10.6)
CALCIUM SERPL-MCNC: 9 MG/DL (ref 8.3–10.6)
CHLORIDE SERPL-SCNC: 106 MMOL/L (ref 99–110)
CHLORIDE SERPL-SCNC: 106 MMOL/L (ref 99–110)
CO2 BLDV-SCNC: 25 MMOL/L
CO2 BLDV-SCNC: 27 MMOL/L
CO2 SERPL-SCNC: 24 MMOL/L (ref 21–32)
CO2 SERPL-SCNC: 26 MMOL/L (ref 21–32)
COHGB MFR BLDV: 1 % (ref 0–1.5)
COHGB MFR BLDV: 1.6 % (ref 0–1.5)
CREAT SERPL-MCNC: 1.2 MG/DL (ref 0.6–1.2)
CREAT SERPL-MCNC: 1.2 MG/DL (ref 0.6–1.2)
GFR SERPLBLD CREATININE-BSD FMLA CKD-EPI: 45 ML/MIN/{1.73_M2}
GFR SERPLBLD CREATININE-BSD FMLA CKD-EPI: 45 ML/MIN/{1.73_M2}
GLUCOSE BLD-MCNC: 133 MG/DL (ref 70–99)
GLUCOSE SERPL-MCNC: 287 MG/DL (ref 70–99)
GLUCOSE SERPL-MCNC: 86 MG/DL (ref 70–99)
HCO3 BLDV-SCNC: 23.1 MMOL/L (ref 23–29)
HCO3 BLDV-SCNC: 25.2 MMOL/L (ref 23–29)
MAGNESIUM SERPL-MCNC: 2.2 MG/DL (ref 1.8–2.4)
METHGB MFR BLDV: 0.1 %
METHGB MFR BLDV: 0.3 %
O2 THERAPY: ABNORMAL
O2 THERAPY: ABNORMAL
PCO2 BLDV: 61.6 MMHG (ref 40–50)
PCO2 BLDV: 67.1 MMHG (ref 40–50)
PERFORMED ON: ABNORMAL
PH BLDV: 7.16 [PH] (ref 7.35–7.45)
PH BLDV: 7.23 [PH] (ref 7.35–7.45)
PHOSPHATE SERPL-MCNC: 4.5 MG/DL (ref 2.5–4.9)
PO2 BLDV: 128.6 MMHG (ref 25–40)
PO2 BLDV: 36.2 MMHG (ref 25–40)
POTASSIUM SERPL-SCNC: 3.7 MMOL/L (ref 3.5–5.1)
POTASSIUM SERPL-SCNC: 4.2 MMOL/L (ref 3.5–5.1)
SAO2 % BLDV: 58 %
SAO2 % BLDV: 98 %
SODIUM SERPL-SCNC: 138 MMOL/L (ref 136–145)
SODIUM SERPL-SCNC: 140 MMOL/L (ref 136–145)
TROPONIN, HIGH SENSITIVITY: 86 NG/L (ref 0–14)

## 2023-12-18 PROCEDURE — 2580000003 HC RX 258: Performed by: INTERNAL MEDICINE

## 2023-12-18 PROCEDURE — 36415 COLL VENOUS BLD VENIPUNCTURE: CPT

## 2023-12-18 PROCEDURE — 99232 SBSQ HOSP IP/OBS MODERATE 35: CPT | Performed by: INTERNAL MEDICINE

## 2023-12-18 PROCEDURE — 6360000002 HC RX W HCPCS: Performed by: INTERNAL MEDICINE

## 2023-12-18 PROCEDURE — 80069 RENAL FUNCTION PANEL: CPT

## 2023-12-18 PROCEDURE — 94761 N-INVAS EAR/PLS OXIMETRY MLT: CPT

## 2023-12-18 PROCEDURE — 2700000000 HC OXYGEN THERAPY PER DAY

## 2023-12-18 PROCEDURE — 6370000000 HC RX 637 (ALT 250 FOR IP): Performed by: INTERNAL MEDICINE

## 2023-12-18 PROCEDURE — 2000000000 HC ICU R&B

## 2023-12-18 PROCEDURE — 5A09457 ASSISTANCE WITH RESPIRATORY VENTILATION, 24-96 CONSECUTIVE HOURS, CONTINUOUS POSITIVE AIRWAY PRESSURE: ICD-10-PCS | Performed by: INTERNAL MEDICINE

## 2023-12-18 PROCEDURE — 99221 1ST HOSP IP/OBS SF/LOW 40: CPT | Performed by: STUDENT IN AN ORGANIZED HEALTH CARE EDUCATION/TRAINING PROGRAM

## 2023-12-18 PROCEDURE — 99223 1ST HOSP IP/OBS HIGH 75: CPT | Performed by: PSYCHIATRY & NEUROLOGY

## 2023-12-18 PROCEDURE — 6360000002 HC RX W HCPCS: Performed by: STUDENT IN AN ORGANIZED HEALTH CARE EDUCATION/TRAINING PROGRAM

## 2023-12-18 PROCEDURE — 94640 AIRWAY INHALATION TREATMENT: CPT

## 2023-12-18 PROCEDURE — 93005 ELECTROCARDIOGRAM TRACING: CPT | Performed by: INTERNAL MEDICINE

## 2023-12-18 PROCEDURE — 94660 CPAP INITIATION&MGMT: CPT

## 2023-12-18 PROCEDURE — 93306 TTE W/DOPPLER COMPLETE: CPT

## 2023-12-18 PROCEDURE — 83735 ASSAY OF MAGNESIUM: CPT

## 2023-12-18 PROCEDURE — 82803 BLOOD GASES ANY COMBINATION: CPT

## 2023-12-18 PROCEDURE — 84484 ASSAY OF TROPONIN QUANT: CPT

## 2023-12-18 PROCEDURE — 71045 X-RAY EXAM CHEST 1 VIEW: CPT

## 2023-12-18 PROCEDURE — 51702 INSERT TEMP BLADDER CATH: CPT

## 2023-12-18 RX ORDER — FUROSEMIDE 10 MG/ML
40 INJECTION INTRAMUSCULAR; INTRAVENOUS ONCE
Status: COMPLETED | OUTPATIENT
Start: 2023-12-18 | End: 2023-12-18

## 2023-12-18 RX ORDER — LEVALBUTEROL 1.25 MG/.5ML
1.25 SOLUTION, CONCENTRATE RESPIRATORY (INHALATION) ONCE
Status: COMPLETED | OUTPATIENT
Start: 2023-12-18 | End: 2023-12-18

## 2023-12-18 RX ORDER — LABETALOL HYDROCHLORIDE 5 MG/ML
10 INJECTION, SOLUTION INTRAVENOUS ONCE
Status: COMPLETED | OUTPATIENT
Start: 2023-12-18 | End: 2023-12-18

## 2023-12-18 RX ORDER — FUROSEMIDE 10 MG/ML
INJECTION INTRAMUSCULAR; INTRAVENOUS
Status: DISPENSED
Start: 2023-12-18 | End: 2023-12-19

## 2023-12-18 RX ORDER — LORAZEPAM 2 MG/ML
1 INJECTION INTRAMUSCULAR ONCE
Status: COMPLETED | OUTPATIENT
Start: 2023-12-18 | End: 2023-12-18

## 2023-12-18 RX ORDER — LEVALBUTEROL 1.25 MG/.5ML
1.25 SOLUTION, CONCENTRATE RESPIRATORY (INHALATION) ONCE
Status: DISCONTINUED | OUTPATIENT
Start: 2023-12-18 | End: 2023-12-18

## 2023-12-18 RX ADMIN — Medication 2 PUFF: at 08:07

## 2023-12-18 RX ADMIN — ROFLUMILAST 500 MCG: 500 TABLET ORAL at 09:02

## 2023-12-18 RX ADMIN — ALBUTEROL SULFATE 2.5 MG: 0.83 SOLUTION RESPIRATORY (INHALATION) at 12:47

## 2023-12-18 RX ADMIN — LABETALOL HYDROCHLORIDE 10 MG: 5 INJECTION, SOLUTION INTRAVENOUS at 20:36

## 2023-12-18 RX ADMIN — HEPARIN SODIUM 5000 UNITS: 5000 INJECTION INTRAVENOUS; SUBCUTANEOUS at 09:03

## 2023-12-18 RX ADMIN — ASPIRIN 81 MG: 81 TABLET, CHEWABLE ORAL at 09:01

## 2023-12-18 RX ADMIN — SODIUM CHLORIDE, PRESERVATIVE FREE 10 ML: 5 INJECTION INTRAVENOUS at 20:56

## 2023-12-18 RX ADMIN — HEPARIN SODIUM 5000 UNITS: 5000 INJECTION INTRAVENOUS; SUBCUTANEOUS at 20:59

## 2023-12-18 RX ADMIN — Medication 2 PUFF: at 19:36

## 2023-12-18 RX ADMIN — SERTRALINE 150 MG: 100 TABLET, FILM COATED ORAL at 09:02

## 2023-12-18 RX ADMIN — LORAZEPAM 1 MG: 2 INJECTION INTRAMUSCULAR; INTRAVENOUS at 20:42

## 2023-12-18 RX ADMIN — ACETAMINOPHEN 500 MG: 500 TABLET ORAL at 06:54

## 2023-12-18 RX ADMIN — LEVALBUTEROL 1.25 MG: 1.25 SOLUTION, CONCENTRATE RESPIRATORY (INHALATION) at 20:33

## 2023-12-18 RX ADMIN — BUSPIRONE HYDROCHLORIDE 5 MG: 5 TABLET ORAL at 12:06

## 2023-12-18 RX ADMIN — METHYLPREDNISOLONE SODIUM SUCCINATE 60 MG: 125 INJECTION INTRAMUSCULAR; INTRAVENOUS at 21:36

## 2023-12-18 RX ADMIN — FUROSEMIDE 40 MG: 10 INJECTION, SOLUTION INTRAMUSCULAR; INTRAVENOUS at 20:09

## 2023-12-18 RX ADMIN — SODIUM CHLORIDE, PRESERVATIVE FREE 10 ML: 5 INJECTION INTRAVENOUS at 20:55

## 2023-12-18 RX ADMIN — PANTOPRAZOLE SODIUM 40 MG: 40 TABLET, DELAYED RELEASE ORAL at 09:02

## 2023-12-18 RX ADMIN — ONDANSETRON 4 MG: 2 INJECTION INTRAMUSCULAR; INTRAVENOUS at 20:38

## 2023-12-18 RX ADMIN — ALBUTEROL SULFATE 2.5 MG: 0.83 SOLUTION RESPIRATORY (INHALATION) at 19:37

## 2023-12-18 RX ADMIN — TIOTROPIUM BROMIDE INHALATION SPRAY 2 PUFF: 3.12 SPRAY, METERED RESPIRATORY (INHALATION) at 08:10

## 2023-12-18 NOTE — PLAN OF CARE
Problem: Discharge Planning  Goal: Discharge to home or other facility with appropriate resources  12/18/2023 0235 by Luigi Sutton RN  Outcome: Progressing  12/18/2023 0226 by Luigi Sutton RN  Outcome: Progressing     Problem: Safety - Adult  Goal: Free from fall injury  12/18/2023 1016 by Foster Noriega RN  Outcome: Progressing  Flowsheets (Taken 12/18/2023 1016)  Free From Fall Injury: Instruct family/caregiver on patient safety  12/18/2023 0235 by Luigi Sutton RN  Outcome: Progressing  12/18/2023 0226 by Luigi Sutton RN  Outcome: Progressing     Problem: Chronic Conditions and Co-morbidities  Goal: Patient's chronic conditions and co-morbidity symptoms are monitored and maintained or improved  12/18/2023 1016 by Foster Noriega RN  Outcome: Progressing  Flowsheets (Taken 12/18/2023 1016)  Care Plan - Patient's Chronic Conditions and Co-Morbidity Symptoms are Monitored and Maintained or Improved:   Monitor and assess patient's chronic conditions and comorbid symptoms for stability, deterioration, or improvement   Collaborate with multidisciplinary team to address chronic and comorbid conditions and prevent exacerbation or deterioration  12/18/2023 0235 by Luigi Sutton RN  Outcome: Progressing  12/18/2023 0226 by Luigi Sutton RN  Outcome: Progressing     Problem: Skin/Tissue Integrity  Goal: Absence of new skin breakdown  Description: 1. Monitor for areas of redness and/or skin breakdown  2. Assess vascular access sites hourly  3. Every 4-6 hours minimum:  Change oxygen saturation probe site  4. Every 4-6 hours:  If on nasal continuous positive airway pressure, respiratory therapy assess nares and determine need for appliance change or resting period.   12/18/2023 1016 by Foster Noriega RN  Outcome: Progressing  12/18/2023 0235 by Luigi Sutton RN  Outcome: Progressing  12/18/2023 0226 by Luigi Sutton RN  Outcome: Progressing     Problem: ABCDS Injury Assessment  Goal: Absence of

## 2023-12-18 NOTE — CONSULTS
[facility]    Patient Name: Zohra Dickerson. Record Number:  [id number]  Primary Care Physician:  Dariana Kenny MD  Date of Consultation: 12/18/2023    Chief Complaint:   Chief Complaint   Patient presents with    Dizziness     Pt to ED per EMS from home with complaint of vertigo x 1 week. Per EMS, pt thinks she might have an inner ear infection. Pt states she just finished a course of abx. Pt states she wears 2L NC at baseline. HISTORY OF PRESENT ILLNESS  Felicia Mitchell is a(n) 81 y.o. female who presented to hospital with dizziness. ENT was consulted for evaluation. She has been having issues for approximately 1 week. She did have a Ranjith-Hallpike performed by physical therapy and has been slightly improved. She complains of decreased hearing on the left and ear pain on the right. She was treated with antibiotics for an otitis media prior to her admission.   Patient Active Problem List   Diagnosis    Hyperlipidemia    Asthma    OA (osteoarthritis)    History of tobacco abuse    COPD exacerbation (HCC)    Septicemia (HCC)    Abnormal chest x-ray    COPD with acute exacerbation (HCC)    Dyspnea and respiratory abnormalities    Tobacco abuse    Moderate malnutrition (HCC)    NSTEMI (non-ST elevated myocardial infarction) ([address])    Acute respiratory failure with hypoxia (HCC)    CAD (coronary artery disease)    Acute pulmonary edema (HCC)    Pulmonary infiltrate    Elevated brain natriuretic peptide (BNP) level    Leukocytosis    Ischemic cardiomyopathy    Acute combined systolic and diastolic congestive heart failure (HCC)    Hypernatremia    NADJA (acute kidney injury) ([address])    Status post aorto-coronary artery bypass graft    Mucus plugging of bronchi    CAD in native artery    S/P CABG x 2    Pneumonia of both lower lobes due to methicillin resistant Staphylococcus aureus (MRSA) ([address])    GI bleed    Severe malnutrition ([address])    Chest pain

## 2023-12-18 NOTE — PROGRESS NOTES
Pt laying in bed during shift assessment this morning. She is alert and oriented. O2 in place, not SOB at rest. Family at bedside. No stool yet, orders dc'd per MD. Vitals stable. Awaiting confirmation if pt will receive MRI today due to hx of shunt placement in heart and consults today. No further concerns at this time. 1210: Pt reports some SOB, sats stable. PRN meds provided. 1345:  Pt has had continuous complaints throughout the day of \"not being able to breathe. \" O2 sats have been stable but she is very SOB. PRN meds for anxiety and nebs have been provided without relief. During transfer to [facility] pt became tachycardic in the 160s with SOB. increased O2 for comfort at times with not much relief. MD Madison notified. No new orders at this time. Bedside Mobility Assessment Tool (BMAT):     Assessment Level 1- Sit and Shake    1. From a semi-reclined position, ask patient to sit up and rotate to a seated position at the side of the bed. Can use the bedrail. 2. Ask patient to reach out and grab your hand and shake making sure patient reaches across his/her midline. Pass- Patient is able to come to a seated position, maintain core strength. Maintains seated balance while reaching across midline. Move on to Assessment Level 2. Assessment Level 2- Stretch and Point   1. With patient in seated position at the side of the bed, have patient place both feet on the floor (or stool) with knees no higher than hips. 2. Ask patient to stretch one leg and straighten the knee, then bend the ankle/flex and point the toes. If appropriate, repeat with the other leg. Pass- Patient is able to demonstrate appropriate quad strength on intended weight bearing limb(s). Move onto Assessment Level 3. Assessment Level 3- Stand   1. Ask patient to elevate off the bed or chair (seated to standing) using an assistive device (cane, bedrail).     2. Patient should be able to raise buttocks off be and hold for a

## 2023-12-18 NOTE — CARE COORDINATION
INTERDISCIPLINARY PLAN OF CARE CONFERENCE    Date/Time: 12/18/2023 1:51 PM  Completed by: Natan Braga RN, Case Management      Patient Name:  Izabela Knott  YOB: 1940  Admitting Diagnosis: Dizziness [R42]  NADJA (acute kidney injury) [facility]) [N17.9]     Admit Date/Time:  12/15/2023  4:31 PM    Chart reviewed. Interdisciplinary team contacted or reviewed plan related to patient progress and discharge plans. Disciplines included Case Management, Nursing, and Dietitian. Current Status: IP 12/15/2023  PT/OT recommendation for discharge plan of care: [facility]     Discharge Recommendations: Home with 24hr assistance  DME needs for discharge: Needs Met        Expected D/C Disposition:  Home    Discharge Plan Comments: HOME. Lives in her home. Has paid caregivers M-F 9 AM to 6 PM and her son stays with her at night. She plans to return home and resume prior care. CM will follow for [address] needs. Chart reviewed. MRI today if cleared. H/O Shunt in heart. Await Neuro eval and recs. ENT consult complete.  OK to DC from their standpoint (to follow up OP)

## 2023-12-18 NOTE — CONSULTS
Neurology consultation note    Patient name: Jae Marks      Chief Complaint:  New onset of spinning sensation. History of present illness: This is an 81years old right-handed female. The patient has been admitted since last week due to the new onset of spinning sensation. The patient was brought to the hospital last week after the patient continues having refractory spinning sensation and unsteadiness. The patient was found to have UTI prior to this problem. At that time, the patient received antibiotics without significant improvement on her dizziness. The patient denies focal weakness or numbness or slurred speech. The patient denies history of CVA or seizure disorder. The patient received vestibular therapy with some improvement. The patient denies significant spinning sensation during my assessment. The patient also complained loss of hearing on the left ear. Unfortunately, the patient is not aware the onset of this hearing loss. ENT is on board.     Past medical history:    Past Medical History:   Diagnosis Date    NADJA (acute kidney injury) ([address])     Asthma     Atrial fibrillation with RVR ([address])     CAD (coronary artery disease)     CHF (congestive heart failure) ([facility])     unsure    Chronic anxiety     COPD (chronic obstructive pulmonary disease) ([facility])     COPD (chronic obstructive pulmonary disease) ([address]) 08/19/2023    GERD (gastroesophageal reflux disease) 09/09/2021    Heart attack ([address]) 2019    History of blood transfusion     Hyperlipidemia     Hypertension     MRSA (methicillin resistant staph aureus) culture positive 09/22/2019    + resp cx    OA (osteoarthritis) 08/12/2014    Thyroid disease        Past surgical history:    Past Surgical History:   Procedure Laterality Date    BRONCHOSCOPY  09/08/2019    Dr. Gena Moon - w/BAL    CARDIAC CATHETERIZATION  09/05/2019    Dr. Tory Gallegos N/A 2/24/2020    COLONOSCOPY DIAGNOSTIC performed by Pari Frost Mary Schmitz DO at [facility] ENDOSCOPY    COLONOSCOPY N/A 10/22/2021    COLONOSCOPY POLYPECTOMY SNARE/COLD BIOPSY [facility]    COLONOSCOPY N/A 8/7/2023    COLONOSCOPY POLYPECTOMY SNARE performed by Darron Langford MD at [facility] ENDOSCOPY    CORONARY ARTERY BYPASS GRAFT N/A 9/19/2019    OFF PUMP CORONARY ARTERY BYPASS GRAFTING X2, INTERNAL MAMMARY ARTERY, SAPHENOUS VEIN GRAFT performed by Yolanda Chase MD at [facility] CVOR    IR MIDLINE CATH  9/20/2021    IR MIDLINE CATH 9/20/2021 [facility] SPECIAL PROCEDURES    MASTECTOMY, PARTIAL Left     SIGMOIDOSCOPY  02/27/2020    4 bands    SIGMOIDOSCOPY N/A 2/27/2020    FLEX SIG W/ BANDING SIGMOIDOSCOPY DIAGNOSTIC FLEXIBLE performed by Rowdy Schmitz DO at [facility] ENDOSCOPY    SIGMOIDOSCOPY N/A 8/31/2023    FLEX SIG. performed by Celestine Lester MD at [facility] ENDOSCOPY    UPPER GASTROINTESTINAL ENDOSCOPY N/A 1/9/2023    EGD DIAGNOSTIC ONLY performed by Darron Langford MD at [facility] ENDOSCOPY    UPPER GASTROINTESTINAL ENDOSCOPY N/A 1/13/2023    EGD BIOPSY performed by Darron Langford MD at [facility] ENDOSCOPY    UPPER GASTROINTESTINAL ENDOSCOPY  1/13/2023    EGD ESOPHAGOGASTRODUODENOSCOPY DILATATION performed by Darron Langford MD at [facility] ENDOSCOPY        Medication:    Current Facility-Administered Medications   Medication Dose Route Frequency Provider Last Rate Last Admin    meclizine (ANTIVERT) tablet 25 mg  25 mg Oral TID PRN Moon Sena MD   25 mg at 12/17/23 0857    acetaminophen (TYLENOL) tablet 500 mg  500 mg Oral Q6H PRN Moon Sena MD   500 mg at 12/18/23 0654    ondansetron (ZOFRAN) injection 4 mg  4 mg IntraVENous Q6H PRN Joy Youngblood MD   4 mg at 12/17/23 1107    lactated ringers IV soln infusion   IntraVENous Continuous Joy Youngblood  mL/hr at 12/17/23 0246 New Bag at 12/17/23 0246    sodium chloride flush 0.9 % injection 5-40 mL  5-40 mL IntraVENous 2 times per day Rylie        Allergies:    Allergies as of 12/15/2023 - Fully Reviewed 12/15/2023   Allergen Reaction Noted    Latex Other (See Comments) 02/27/2020    Codeine Other (See Comments) 12/20/2012        Social history:     reports that she quit smoking about 4 years ago. Her smoking use included cigarettes. She started smoking about 54 years ago. She has a 50.0 pack-year smoking history. She has been exposed to tobacco smoke. She has never used smokeless tobacco. She reports that she does not drink alcohol and does not use drugs.     Family history:    Family History   Problem Relation Age of Onset    Cancer Mother     Heart Disease Father     Stroke Father     Heart Disease Sister     Stroke Sister         Review of system:  No chest pain, shortness of breath, palpitation, cough, fever, abdominal pain, vomiting, diarrhea, dysuria, vertigo, joint pain, change in speech/vision or new onset of weakness/numbness. Remaining as per HPI.      /74   Pulse 81   Temp 97.1 °F (36.2 °C) (Oral)   Resp 18   Ht 1.6 m (5' 3\")   Wt 51.5 kg (113 lb 8 oz)   SpO2 98%   BMI 20.11 kg/m²     Neurological examination:  MENTAL STATUS:  Alert and oriented to person.  LANG/SPEECH: No dysarthria.  CRANIAL NERVES: Left sensorineural hearing loss.  MOTOR: No pronator drift or leg drift.  REFLEXES: Generalized 2+.  SENSORY: Grossly intact.  COORD: No tremor.    Imaging, procedure, and laboratory data:   I reviewed the brain imagings.    Assessment:    1.  Acute vestibular syndrome.  2.  Left hearing loss.  3.  Generalized brain atrophy.  4.  Underlying atrial fibrillation.     The patient has no focality except broken pursuit from my assessment.  The CT brain showed prominent brain atrophy without extensive white matter changes or intracerebral hemorrhage.  MRI brain is pending.    From neurology perspective, the patient is suspicious to have left lateral vertigo.    However, the CT brain shows significant brain atrophy.    This can be central

## 2023-12-18 NOTE — PLAN OF CARE
Problem: Discharge Planning  Goal: Discharge to home or other facility with appropriate resources  12/18/2023 0235 by Lauren Jack RN  Outcome: Progressing  12/18/2023 0226 by Lauren Jack RN  Outcome: Progressing     Problem: Safety - Adult  Goal: Free from fall injury  12/18/2023 0235 by Lauren Jack RN  Outcome: Progressing  12/18/2023 0226 by Lauren Jack RN  Outcome: Progressing     Problem: Chronic Conditions and Co-morbidities  Goal: Patient's chronic conditions and co-morbidity symptoms are monitored and maintained or improved  12/18/2023 0235 by Lauren Jack RN  Outcome: Progressing  12/18/2023 0226 by Lauren Jack RN  Outcome: Progressing     Problem: Skin/Tissue Integrity  Goal: Absence of new skin breakdown  Description: 1. Monitor for areas of redness and/or skin breakdown  2. Assess vascular access sites hourly  3. Every 4-6 hours minimum:  Change oxygen saturation probe site  4. Every 4-6 hours:  If on nasal continuous positive airway pressure, respiratory therapy assess nares and determine need for appliance change or resting period.   12/18/2023 0235 by Lauren Jack RN  Outcome: Progressing  12/18/2023 0226 by Lauren Jack RN  Outcome: Progressing     Problem: ABCDS Injury Assessment  Goal: Absence of physical injury  12/18/2023 0235 by Lauren Jack RN  Outcome: Progressing  12/18/2023 0226 by Lauren Jack RN  Outcome: Progressing

## 2023-12-18 NOTE — PROGRESS NOTES
Handoff report and transfer of care given at bedside to [facility]. Patient in stable condition, denies needs/concerns at this time. Call light within reach.

## 2023-12-18 NOTE — PROGRESS NOTES
Physical Therapy    Per RN, pt was had a recent bout of tachycardia and extensive physical therapy can be held. As discussed with Dr. Becky Murry, I was able to check in on the patient today, who stated that she had minimal to no dizziness today and that she we feeling much better as it pertains to dizziness. Will continue to follow. Re-attempt as appropriate and schedule permits.     Lynnann Baumgarten, PT, DPT

## 2023-12-19 PROBLEM — J96.21 ACUTE ON CHRONIC HYPOXIC RESPIRATORY FAILURE (HCC): Status: ACTIVE | Noted: 2021-08-30

## 2023-12-19 LAB
BASE EXCESS BLDV CALC-SCNC: 0.1 MMOL/L (ref -3–3)
CO2 BLDV-SCNC: 25 MMOL/L
COHGB MFR BLDV: 2.8 % (ref 0–1.5)
EKG ATRIAL RATE: 156 BPM
EKG DIAGNOSIS: NORMAL
EKG Q-T INTERVAL: 316 MS
EKG QRS DURATION: 94 MS
EKG QTC CALCULATION (BAZETT): 523 MS
EKG R AXIS: -68 DEGREES
EKG T AXIS: 94 DEGREES
EKG VENTRICULAR RATE: 165 BPM
HCO3 BLDV-SCNC: 24 MMOL/L (ref 23–29)
METHGB MFR BLDV: 0.3 %
O2 CT VFR BLDV CALC: 15 VOL %
O2 THERAPY: ABNORMAL
PCO2 BLDV: 36.3 MMHG (ref 40–50)
PH BLDV: 7.44 [PH] (ref 7.35–7.45)
PO2 BLDV: 80.2 MMHG (ref 25–40)
SAO2 % BLDV: 96 %

## 2023-12-19 PROCEDURE — 6370000000 HC RX 637 (ALT 250 FOR IP): Performed by: INTERNAL MEDICINE

## 2023-12-19 PROCEDURE — 6360000002 HC RX W HCPCS: Performed by: INTERNAL MEDICINE

## 2023-12-19 PROCEDURE — 2700000000 HC OXYGEN THERAPY PER DAY

## 2023-12-19 PROCEDURE — 99233 SBSQ HOSP IP/OBS HIGH 50: CPT | Performed by: PSYCHIATRY & NEUROLOGY

## 2023-12-19 PROCEDURE — 99233 SBSQ HOSP IP/OBS HIGH 50: CPT | Performed by: INTERNAL MEDICINE

## 2023-12-19 PROCEDURE — 99291 CRITICAL CARE FIRST HOUR: CPT | Performed by: INTERNAL MEDICINE

## 2023-12-19 PROCEDURE — 82803 BLOOD GASES ANY COMBINATION: CPT

## 2023-12-19 PROCEDURE — 2580000003 HC RX 258: Performed by: INTERNAL MEDICINE

## 2023-12-19 PROCEDURE — 2000000000 HC ICU R&B

## 2023-12-19 PROCEDURE — 93010 ELECTROCARDIOGRAM REPORT: CPT | Performed by: INTERNAL MEDICINE

## 2023-12-19 PROCEDURE — 94640 AIRWAY INHALATION TREATMENT: CPT

## 2023-12-19 PROCEDURE — 36415 COLL VENOUS BLD VENIPUNCTURE: CPT

## 2023-12-19 PROCEDURE — 94761 N-INVAS EAR/PLS OXIMETRY MLT: CPT

## 2023-12-19 PROCEDURE — 94660 CPAP INITIATION&MGMT: CPT

## 2023-12-19 RX ORDER — IPRATROPIUM BROMIDE AND ALBUTEROL SULFATE 2.5; .5 MG/3ML; MG/3ML
1 SOLUTION RESPIRATORY (INHALATION) EVERY 4 HOURS PRN
Status: DISCONTINUED | OUTPATIENT
Start: 2023-12-19 | End: 2023-12-22 | Stop reason: HOSPADM

## 2023-12-19 RX ORDER — IPRATROPIUM BROMIDE AND ALBUTEROL SULFATE 2.5; .5 MG/3ML; MG/3ML
1 SOLUTION RESPIRATORY (INHALATION)
Status: DISCONTINUED | OUTPATIENT
Start: 2023-12-19 | End: 2023-12-22 | Stop reason: HOSPADM

## 2023-12-19 RX ORDER — IPRATROPIUM BROMIDE AND ALBUTEROL SULFATE 2.5; .5 MG/3ML; MG/3ML
1 SOLUTION RESPIRATORY (INHALATION)
Status: DISCONTINUED | OUTPATIENT
Start: 2023-12-19 | End: 2023-12-19

## 2023-12-19 RX ADMIN — BUSPIRONE HYDROCHLORIDE 5 MG: 5 TABLET ORAL at 08:30

## 2023-12-19 RX ADMIN — METOPROLOL TARTRATE 25 MG: 25 TABLET, FILM COATED ORAL at 10:10

## 2023-12-19 RX ADMIN — METHYLPREDNISOLONE SODIUM SUCCINATE 40 MG: 40 INJECTION INTRAMUSCULAR; INTRAVENOUS at 10:10

## 2023-12-19 RX ADMIN — SERTRALINE 150 MG: 100 TABLET, FILM COATED ORAL at 08:29

## 2023-12-19 RX ADMIN — Medication 2 PUFF: at 08:26

## 2023-12-19 RX ADMIN — HEPARIN SODIUM 5000 UNITS: 5000 INJECTION INTRAVENOUS; SUBCUTANEOUS at 20:28

## 2023-12-19 RX ADMIN — ATORVASTATIN CALCIUM 40 MG: 40 TABLET, FILM COATED ORAL at 20:28

## 2023-12-19 RX ADMIN — MUPIROCIN: 20 OINTMENT TOPICAL at 20:31

## 2023-12-19 RX ADMIN — PANTOPRAZOLE SODIUM 40 MG: 40 TABLET, DELAYED RELEASE ORAL at 08:30

## 2023-12-19 RX ADMIN — SODIUM CHLORIDE, PRESERVATIVE FREE 10 ML: 5 INJECTION INTRAVENOUS at 20:28

## 2023-12-19 RX ADMIN — IPRATROPIUM BROMIDE AND ALBUTEROL SULFATE 1 DOSE: 2.5; .5 SOLUTION RESPIRATORY (INHALATION) at 08:26

## 2023-12-19 RX ADMIN — ROFLUMILAST 500 MCG: 500 TABLET ORAL at 08:30

## 2023-12-19 RX ADMIN — Medication 2 PUFF: at 19:14

## 2023-12-19 RX ADMIN — HEPARIN SODIUM 5000 UNITS: 5000 INJECTION INTRAVENOUS; SUBCUTANEOUS at 08:30

## 2023-12-19 RX ADMIN — SODIUM CHLORIDE, PRESERVATIVE FREE 10 ML: 5 INJECTION INTRAVENOUS at 08:36

## 2023-12-19 RX ADMIN — ASPIRIN 81 MG: 81 TABLET, CHEWABLE ORAL at 08:30

## 2023-12-19 RX ADMIN — IPRATROPIUM BROMIDE AND ALBUTEROL SULFATE 1 DOSE: 2.5; .5 SOLUTION RESPIRATORY (INHALATION) at 19:14

## 2023-12-19 RX ADMIN — METOPROLOL TARTRATE 25 MG: 25 TABLET, FILM COATED ORAL at 20:28

## 2023-12-19 NOTE — PROGRESS NOTES
12/18/23 2028   NIV Type   Mode Bilevel   Mask Type Full face mask   Mask Size Small   Assessment   Respirations (!) 37   SpO2 95 %   Settings/Measurements   IPAP 14 cmH20   CPAP/EPAP 7 cmH2O   Vt (Measured) 422 mL   Rate Ordered 12   FiO2  40 %

## 2023-12-19 NOTE — PROGRESS NOTES
Placed pt back on bipap. O2 sats were dropping and work of breathing increased.   O2 currently 95% RR 29

## 2023-12-19 NOTE — PROGRESS NOTES
Heart rate now 99  BP 84/69  stat CRX done  Dr Brielle Nunn at bedside  ativan given for anxiety see MAR

## 2023-12-19 NOTE — PROGRESS NOTES
Updated family  daughter Author Late, questions answered,  review code status and pts wishes about life support. Daughter states they want evveryt ethan done for her mother.   Daughter request call if pt  worsens

## 2023-12-19 NOTE — PROGRESS NOTES
Handoff report and transfer of care given at bedside to [facility] and Amber Garcia.

## 2023-12-19 NOTE — PROGRESS NOTES
AM assessment done. Pt placed on 4 liters NC from BiPap. Pt is alert & oriented. She is resting w/out evidence of distress @ this time.

## 2023-12-19 NOTE — FLOWSHEET NOTE
12/18/23 2127   Assessment   Charting Type Shift assessment   Psychosocial   Psychosocial (WDL) WDL   Neurological   Neuro (WDL) WDL  (Degenerative eye disease, dizziness)   Level of Consciousness 0   Orientation Level Oriented X4   Cognition Appropriate judgement;Poor judgement; Follows commands   Speech Clear   Jennifer Coma Scale   Eye Opening 4   Best Verbal Response 5   Best Motor Response 6   Pickens Coma Scale Score 15   Respiratory   Respiratory (WDL) X  (SOB)   Respiratory Pattern Tachypneic   Respiratory Depth Deep   Respiratory Quality/Effort Accessory muscle use;Labored;Dyspnea at rest   Chest Assessment Trachea midline; Chest expansion symmetrical   L Breath Sounds Coarse Crackles   R Breath Sounds Coarse Crackles   Breath Sounds   Upper Airway Sounds Coarse   Breath Sounds Bilateral Coarse crackles   Cough/Sputum   Sputum How Obtained Spontaneous cough   Cough Non-productive   Cardiac   Cardiac (WDL) X   Cardiac Regularity Regular   Heart Sounds S1, S2   Cardiac Rhythm Atrial fib   Gastrointestinal   Abdominal (WDL) WDL   RUQ Bowel Sounds Active   LUQ Bowel Sounds Active   RLQ Bowel Sounds Active   LLQ Bowel Sounds Active   Genitourinary   Genitourinary (WDL) X  (f/c)   Suprapubic Tenderness No   Dysuria (Pain/Burning w/Urination) No   Peripheral Vascular   Peripheral Vascular (WDL) X   Edema Generalized   Skin Integumentary    Skin Integumentary (WDL) X  (scattered bruising)   Musculoskeletal   Musculoskeletal (WDL) X  (General weakness)   RUE Weakness   LUE Weakness   RL Extremity Weakness   LL Extremity Weakness   Urinary Catheter 12/18/23   Placement Date/Time: 12/18/23 2127     $ Urethral catheter insertion $ Not inserted for procedure   Catheter Indications Need for fluid volume management of the critically ill patient in a critical care setting   Site Assessment Pink   Urine Color Yellow   Urine Appearance Clear   Collection Container Standard   Securement Method Securing device (Describe) Catheter Care  Perineal wipes   Status Draining

## 2023-12-19 NOTE — PROGRESS NOTES
12/19/23 0313   NIV Type   NIV Started/Stopped On   Equipment Type V60   Mode Bilevel   Mask Type Full face mask   Mask Size Small   Assessment   Pulse 92   Respirations 24   SpO2 100 %   Settings/Measurements   IPAP 14 cmH20   CPAP/EPAP 7 cmH2O   Vt (Measured) 434 mL   Rate Ordered 12   Insp Rise Time (%) 1 %   FiO2  40 %   I Time/ I Time % 0.9 s   Minute Volume (L/min) 10.8 Liters   Mask Leak (lpm) 38 lpm   Patient's Home Machine No   Oxygen Therapy/Pulse Ox   O2 Therapy Oxygen   O2 Device PAP (positive airway pressure)

## 2023-12-19 NOTE — PROGRESS NOTES
Called into room by CMU, Pt -160's. Pt sitting on side of bed breathing heavy, stating that she can not breathe, Vital signs recorded, With BP elevated to 180/138, , Resp 30, and SpO2 95 on 4L NC. Called Rapid response at that time.        2009: MD in room EKG ordered and interpreted by MD. Lasix order placed and completed, Bipap order placed and transferred to ICU

## 2023-12-19 NOTE — PLAN OF CARE
Problem: Chronic Conditions and Co-morbidities  Goal: Patient's chronic conditions and co-morbidity symptoms are monitored and maintained or improved  Outcome: Progressing   CHF Care Plan      Patient's EF (Ejection Fraction) is greater than 40%    Heart Failure Medications:  Diuretics[de-identified] Furosemide here, none at home    (One of the following REQUIRED for EF </= 40%/SYSTOLIC FAILURE but MAY be used in EF% >40%/DIASTOLIC FAILURE)        ACE[de-identified] None        ARB[de-identified] None         ARNI[de-identified] None    (Beta Blockers)  NON- Evidenced Based Beta Blocker (for EF% >40%/DIASTOLIC FAILURE): None    Evidenced Based Beta Blocker::(REQUIRED for EF% <40%/SYSTOLIC FAILURE) Metoprolol SUCCinate- Toprol XL  . .................................................................................................................................................. Patient's weights and intake/output reviewed    Daily Weight log at bedside, patient/family participation in use of log: no    Patient's current weight today shows a difference of 0 lbs       Intake/Output Summary (Last 24 hours) at 12/19/2023 0145  Last data filed at 12/18/2023 2127  Gross per 24 hour   Intake 240 ml   Output --   Net 240 ml       Education Booklet Provided: no    Comorbidities Reviewed Yes    Patient has a past medical history of NADJA (acute kidney injury) ([address]), Asthma, Atrial fibrillation with RVR ([address]), CAD (coronary artery disease), CHF (congestive heart failure) ([address]), Chronic anxiety, COPD (chronic obstructive pulmonary disease) ([address]), COPD (chronic obstructive pulmonary disease) ([address]), GERD (gastroesophageal reflux disease), Heart attack ([address]), History of blood transfusion, Hyperlipidemia, Hypertension, MRSA (methicillin resistant staph aureus) culture positive, OA (osteoarthritis), and Thyroid disease.      >>For CHF and Comorbidity documentation on Education Time and Topics, please see Education Tab    Progressive Mobility Assessment:  What is this patient's

## 2023-12-19 NOTE — PROGRESS NOTES
4 Eyes Skin Assessment     NAME:  Leona Christensen  YOB: 1940  MEDICAL RECORD NUMBER:  [id number]    The patient is being assessed for  Transfer to New Unit    I agree that at least one RN has performed a thorough Head to Toe Skin Assessment on the patient. ALL assessment sites listed below have been assessed. Areas assessed by both nurses:    Head, Face, Ears, Shoulders, Back, Chest, Arms, Elbows, Hands, Sacrum. Buttock, Coccyx, Ischium, Legs. Feet and Heels, and Under Medical Devices         Does the Patient have a Wound?  No noted wound(s)       Trey Prevention initiated by RN: No  Wound Care Orders initiated by RN: No    Pressure Injury (Stage 3,4, Unstageable, DTI, NWPT, and Complex wounds) if present, place Wound referral order by RN under : No    New Ostomies, if present place, Ostomy referral order under : No     Nurse 1 eSignature: Electronically signed by Ahmet Camara RN on 12/18/23 at 9:23 PM EST    **SHARE this note so that the co-signing nurse can place an eSignature**    Nurse 2 eSignature: {Esignature:[id number]}

## 2023-12-19 NOTE — PROGRESS NOTES
12/18/23 2579   NIV Type   NIV Started/Stopped On   Equipment Type V60   Mode Bilevel   Mask Type Full face mask   Mask Size Small   Assessment   Pulse (!) 105   Respirations (!) 34   SpO2 99 %   Skin Protection for O2 Device Yes   Location Nose   Settings/Measurements   PIP Observed 15 cm H20   IPAP 14 cmH20   CPAP/EPAP 7 cmH2O   Vt (Measured) 310 mL   Rate Ordered 12   Insp Rise Time (%) 1 %   FiO2  40 %   I Time/ I Time % 0.9 s   Minute Volume (L/min) 12.6 Liters   Mask Leak (lpm) 1 lpm   Patient's Home Machine No   Alarm Settings   Alarms On Y   Low Pressure (cmH2O) 7 cmH2O   High Pressure (cmH2O) 40 cmH2O   Delay Alarm 20 sec(s)   RR Low (bpm) 12   RR High (bpm) 50 br/min   Oxygen Therapy/Pulse Ox   O2 Therapy Oxygen   O2 Device PAP (positive airway pressure)

## 2023-12-19 NOTE — SIGNIFICANT EVENT
Writer responded to rapid response code  Patient is in apparent respiratory distress  -EKG;  A-fib with RVR  -Chest x-ray: Hyperinflated chest otherwise unremarkable  -Trop 86  -VBG 7.15 /67  Plan:  -Transfer to the ICU  -Started on BiPAP for the work of breathing  -Labetalol 10 mg IV , Lasix 40 mg, Solu-Medrol 60 mg IV, breathing treatment

## 2023-12-19 NOTE — PROGRESS NOTES
CM-SR, A.Fib, VSS, afebrile. Pt on 4 liters NC & then BiPap PRN. UO WNL. Pt is resting on BiPap @ this time.

## 2023-12-19 NOTE — PROGRESS NOTES
Pt to ICU 11  from [facility] following rapid  placed on PAP  40%  14/7 Lopressor given  2038  nausea  therefore PAP removed and  Zofran given

## 2023-12-20 LAB
ANION GAP SERPL CALCULATED.3IONS-SCNC: 10 MMOL/L (ref 3–16)
BASOPHILS # BLD: 0 K/UL (ref 0–0.2)
BASOPHILS NFR BLD: 0.1 %
BUN SERPL-MCNC: 30 MG/DL (ref 7–20)
CALCIUM SERPL-MCNC: 9.3 MG/DL (ref 8.3–10.6)
CHLORIDE SERPL-SCNC: 106 MMOL/L (ref 99–110)
CO2 SERPL-SCNC: 24 MMOL/L (ref 21–32)
CREAT SERPL-MCNC: 1.5 MG/DL (ref 0.6–1.2)
DEPRECATED RDW RBC AUTO: 14.8 % (ref 12.4–15.4)
EOSINOPHIL # BLD: 0 K/UL (ref 0–0.6)
EOSINOPHIL NFR BLD: 0.2 %
GFR SERPLBLD CREATININE-BSD FMLA CKD-EPI: 35 ML/MIN/{1.73_M2}
GLUCOSE SERPL-MCNC: 94 MG/DL (ref 70–99)
HCT VFR BLD AUTO: 29.6 % (ref 36–48)
HGB BLD-MCNC: 9.7 G/DL (ref 12–16)
LYMPHOCYTES # BLD: 1.9 K/UL (ref 1–5.1)
LYMPHOCYTES NFR BLD: 21.1 %
MCH RBC QN AUTO: 30.5 PG (ref 26–34)
MCHC RBC AUTO-ENTMCNC: 32.7 G/DL (ref 31–36)
MCV RBC AUTO: 93.3 FL (ref 80–100)
MONOCYTES # BLD: 0.6 K/UL (ref 0–1.3)
MONOCYTES NFR BLD: 7.1 %
NEUTROPHILS # BLD: 6.5 K/UL (ref 1.7–7.7)
NEUTROPHILS NFR BLD: 71.5 %
PLATELET # BLD AUTO: 183 K/UL (ref 135–450)
PMV BLD AUTO: 8.5 FL (ref 5–10.5)
POTASSIUM SERPL-SCNC: 4.6 MMOL/L (ref 3.5–5.1)
RBC # BLD AUTO: 3.18 M/UL (ref 4–5.2)
SODIUM SERPL-SCNC: 140 MMOL/L (ref 136–145)
WBC # BLD AUTO: 9.1 K/UL (ref 4–11)

## 2023-12-20 PROCEDURE — 80048 BASIC METABOLIC PNL TOTAL CA: CPT

## 2023-12-20 PROCEDURE — 36415 COLL VENOUS BLD VENIPUNCTURE: CPT

## 2023-12-20 PROCEDURE — 6370000000 HC RX 637 (ALT 250 FOR IP): Performed by: INTERNAL MEDICINE

## 2023-12-20 PROCEDURE — 99232 SBSQ HOSP IP/OBS MODERATE 35: CPT | Performed by: INTERNAL MEDICINE

## 2023-12-20 PROCEDURE — 99233 SBSQ HOSP IP/OBS HIGH 50: CPT | Performed by: INTERNAL MEDICINE

## 2023-12-20 PROCEDURE — 99232 SBSQ HOSP IP/OBS MODERATE 35: CPT | Performed by: PSYCHIATRY & NEUROLOGY

## 2023-12-20 PROCEDURE — 6360000002 HC RX W HCPCS: Performed by: INTERNAL MEDICINE

## 2023-12-20 PROCEDURE — 2580000003 HC RX 258: Performed by: INTERNAL MEDICINE

## 2023-12-20 PROCEDURE — 85025 COMPLETE CBC W/AUTO DIFF WBC: CPT

## 2023-12-20 PROCEDURE — 94660 CPAP INITIATION&MGMT: CPT

## 2023-12-20 PROCEDURE — 2700000000 HC OXYGEN THERAPY PER DAY

## 2023-12-20 PROCEDURE — 94761 N-INVAS EAR/PLS OXIMETRY MLT: CPT

## 2023-12-20 PROCEDURE — 2060000000 HC ICU INTERMEDIATE R&B

## 2023-12-20 PROCEDURE — 94640 AIRWAY INHALATION TREATMENT: CPT

## 2023-12-20 RX ORDER — PREDNISONE 20 MG/1
40 TABLET ORAL DAILY
Status: DISCONTINUED | OUTPATIENT
Start: 2023-12-20 | End: 2023-12-22

## 2023-12-20 RX ADMIN — HEPARIN SODIUM 5000 UNITS: 5000 INJECTION INTRAVENOUS; SUBCUTANEOUS at 08:42

## 2023-12-20 RX ADMIN — BUSPIRONE HYDROCHLORIDE 5 MG: 5 TABLET ORAL at 08:41

## 2023-12-20 RX ADMIN — ATORVASTATIN CALCIUM 40 MG: 40 TABLET, FILM COATED ORAL at 21:08

## 2023-12-20 RX ADMIN — POLYETHYLENE GLYCOL (3350) 17 G: 17 POWDER, FOR SOLUTION ORAL at 13:51

## 2023-12-20 RX ADMIN — IPRATROPIUM BROMIDE AND ALBUTEROL SULFATE 1 DOSE: 2.5; .5 SOLUTION RESPIRATORY (INHALATION) at 08:10

## 2023-12-20 RX ADMIN — ROFLUMILAST 500 MCG: 500 TABLET ORAL at 08:41

## 2023-12-20 RX ADMIN — METHYLPREDNISOLONE SODIUM SUCCINATE 40 MG: 40 INJECTION INTRAMUSCULAR; INTRAVENOUS at 08:50

## 2023-12-20 RX ADMIN — PANTOPRAZOLE SODIUM 40 MG: 40 TABLET, DELAYED RELEASE ORAL at 08:41

## 2023-12-20 RX ADMIN — HEPARIN SODIUM 5000 UNITS: 5000 INJECTION INTRAVENOUS; SUBCUTANEOUS at 21:09

## 2023-12-20 RX ADMIN — Medication 5 MG: at 21:08

## 2023-12-20 RX ADMIN — SODIUM CHLORIDE, PRESERVATIVE FREE 10 ML: 5 INJECTION INTRAVENOUS at 08:42

## 2023-12-20 RX ADMIN — METOPROLOL TARTRATE 25 MG: 25 TABLET, FILM COATED ORAL at 08:50

## 2023-12-20 RX ADMIN — MUPIROCIN: 20 OINTMENT TOPICAL at 20:05

## 2023-12-20 RX ADMIN — SERTRALINE 150 MG: 100 TABLET, FILM COATED ORAL at 08:41

## 2023-12-20 RX ADMIN — BUSPIRONE HYDROCHLORIDE 5 MG: 5 TABLET ORAL at 17:54

## 2023-12-20 RX ADMIN — ASPIRIN 81 MG: 81 TABLET, CHEWABLE ORAL at 08:41

## 2023-12-20 RX ADMIN — Medication 2 PUFF: at 19:31

## 2023-12-20 RX ADMIN — ACETAMINOPHEN 500 MG: 500 TABLET ORAL at 03:44

## 2023-12-20 RX ADMIN — MUPIROCIN: 20 OINTMENT TOPICAL at 08:44

## 2023-12-20 RX ADMIN — Medication 2 PUFF: at 08:12

## 2023-12-20 RX ADMIN — IPRATROPIUM BROMIDE AND ALBUTEROL SULFATE 1 DOSE: 2.5; .5 SOLUTION RESPIRATORY (INHALATION) at 19:31

## 2023-12-20 RX ADMIN — METOPROLOL TARTRATE 25 MG: 25 TABLET, FILM COATED ORAL at 21:08

## 2023-12-20 ASSESSMENT — PAIN DESCRIPTION - LOCATION: LOCATION: HEAD

## 2023-12-20 ASSESSMENT — PAIN SCALES - GENERAL: PAINLEVEL_OUTOF10: 8

## 2023-12-20 ASSESSMENT — PAIN DESCRIPTION - DESCRIPTORS: DESCRIPTORS: ACHING

## 2023-12-20 NOTE — PLAN OF CARE
Problem: Chronic Conditions and Co-morbidities  Goal: Patient's chronic conditions and co-morbidity symptoms are monitored and maintained or improved  Outcome: Progressing     Problem: Cardiovascular - Adult  Goal: Maintains optimal cardiac output and hemodynamic stability  Outcome: Progressing     Problem: Cardiovascular - Adult  Goal: Absence of cardiac dysrhythmias or at baseline  Outcome: Progressing   HEART FAILURE CARE PLAN:    Comorbidities Reviewed: Yes   Patient has a past medical history of NADJA (acute kidney injury) ([address]), Asthma, Atrial fibrillation with RVR ([address]), CAD (coronary artery disease), CHF (congestive heart failure) ([address]), Chronic anxiety, COPD (chronic obstructive pulmonary disease) ([address]), COPD (chronic obstructive pulmonary disease) ([address]), GERD (gastroesophageal reflux disease), Heart attack ([address]), History of blood transfusion, Hyperlipidemia, Hypertension, MRSA (methicillin resistant staph aureus) culture positive, OA (osteoarthritis), and Thyroid disease. ECHOCARDIOGRAM Reviewed: Yes   Patient's Ejection Fraction (EF) is greater than 40%    Weights Reviewed:  Yes   Admission weight: 51.1 kg (112 lb 10.5 oz)   Wt Readings from Last 3 Encounters:   12/20/23 53 kg (116 lb 13.5 oz)   11/08/23 49.4 kg (109 lb)   09/14/23 49.5 kg (109 lb 3.2 oz)     Intake & Output Reviewed: Yes     Intake/Output Summary (Last 24 hours) at 12/20/2023 1424  Last data filed at 12/20/2023 1300  Gross per 24 hour   Intake 660 ml   Output 775 ml   Net -115 ml     Medications Reviewed: Yes   SCHEDULED HOSPITAL MEDICATIONS:   predniSONE  40 mg Oral Daily    mupirocin   Each Nostril BID    ipratropium 0.5 mg-albuterol 2.5 mg  1 Dose Inhalation BID RT    sodium chloride flush  5-40 mL IntraVENous 2 times per day    heparin (porcine)  5,000 Units SubCUTAneous BID    aspirin  81 mg Oral Daily    atorvastatin  40 mg Oral Nightly    melatonin  5 mg Oral Nightly    metoprolol tartrate  25 mg Oral BID    pantoprazole  40 mg Oral Daily    Roflumilast  500 mcg Oral Daily    sertraline  150 mg Oral Daily    budesonide-formoterol  2 puff Inhalation BID RT     ACE/ARB/ARNI is REQUIRED for EF </= 40% SYSTOLIC FAILURE:   ACE:: None  ARB:: None  ARNI:: None    Evidenced-Based Beta Blocker is REQUIRED for EF </= 40% SYSTOLIC FAILURE:   :: None    Diuretics:  :: None    Diet Reviewed: Yes   ADULT DIET; Regular; Low Potassium (Less than 3000 mg/day)    Goal of Care Reviewed: Yes   Patient and/or Family's stated Goal of Care this Admission: reduce shortness of breath, increase activity tolerance, better understand heart failure and disease management, and be more comfortable prior to discharge.

## 2023-12-20 NOTE — PROGRESS NOTES
Shift assessment completed, see flow sheet. Pt was lying in bed with bipap on. Pt removed to 4 L. Pt is A/O. Pt does c/o of some anxiety this AM has prn buspar. SpO2 100%. Respirations are easy, even, and unlabored. Bilateral lung sounds diminished. VSS  NSR w/PAC on the monitor    PIV, WNL   All lines and monitoring devices in place. Bill is patent and secured. .  Bed in lowest position with wheels locked. No needs expressed at this time. Will continue to monitor.

## 2023-12-20 NOTE — PROGRESS NOTES
Neurology Progress Note    ID: Blas Gowers is a 81 y.o. female    : 1940     LOS: 5 days     ASSESSMENT    1. Acute vestibular syndrome. 2.  Left hearing loss. 3.  Generalized brain atrophy. 4.  Underlying atrial fibrillation. The patient has no focality except broken pursuit from my assessment. The CT brain showed prominent brain atrophy without extensive white matter changes or intracerebral hemorrhage. MRI brain is pending. From neurology perspective, the patient is suspicious to have left lateral vertigo. However, the CT brain shows significant brain atrophy. This can be central vertigo with superimposed peripheral vertigo. The patient may need VNG to differentiate central etiology. 23: The patient developed shortness of breath with tachycardia overnight. The patient was transferred to ICU for observation. The patient denies significant dizziness during my assessment. The patient also denies new focal neurological deficit. MRI brain is still pending. 23: The patient is neurologically stabilized. The patient denies significant dizziness today. MRI brain is pending. Plan:     1. MRI brain to exclude CVA. 2.  Target blood pressure below 140/90.  3.  PT/OT/ST. 4.  Agree with aspirin and statin for now. 5.  Continue cardiac telemetry.     Medications:  Scheduled Meds:    mupirocin   Each Nostril BID    methylPREDNISolone  40 mg IntraVENous Daily    ipratropium 0.5 mg-albuterol 2.5 mg  1 Dose Inhalation BID RT    sodium chloride flush  5-40 mL IntraVENous 2 times per day    heparin (porcine)  5,000 Units SubCUTAneous BID    aspirin  81 mg Oral Daily    atorvastatin  40 mg Oral Nightly    melatonin  5 mg Oral Nightly    metoprolol tartrate  25 mg Oral BID    pantoprazole  40 mg Oral Daily    Roflumilast  500 mcg Oral Daily    sertraline  150 mg Oral Daily    budesonide-formoterol  2 puff Inhalation BID RT     Continuous Infusions:    sodium chloride

## 2023-12-20 NOTE — PROGRESS NOTES
Patient resting in bed, assessment complete, oxygen on 4 liters NC, SOB with exertion, unlabored at this time, normal depth, SaO2 99%. Denies needs. BM today, tolerating diet. Will continue to monitor. Call light in reach.

## 2023-12-20 NOTE — PROGRESS NOTES
12/20/23 0000   NIV Type   NIV Started/Stopped (S)  On   Equipment Type v60   Mode Bilevel   Mask Type Full face mask   Mask Size Small   Assessment   Pulse 82   Respirations 27   SpO2 100 %   Settings/Measurements   IPAP 14 cmH20   CPAP/EPAP 7 cmH2O   Vt (Measured) 437 mL   Rate Ordered 12   FiO2  40 %   I Time/ I Time % 0.9 s   Minute Volume (L/min) 12 Liters   Mask Leak (lpm) 0 lpm   Patient's Home Machine No   Alarm Settings   Alarms On Y   Low Pressure (cmH2O) 7 cmH2O   High Pressure (cmH2O) 30 cmH2O   Delay Alarm 20 sec(s)   RR Low (bpm) 12   RR High (bpm) 50 br/min Physical Therapy  Facility/Department: [address] IP REHAB  Daily Treatment Note  NAME: Abdulkadir Shaikh  : 1959  MRN: [id number]    Date of Service: 2019    Discharge Recommendations:  Continue to assess pending progress, Home with assist PRN   PT Equipment Recommendations  Equipment Needed: No  Other: Amb. without an AD    Patient Diagnosis(es): There were no encounter diagnoses. has a past medical history of Smoking history. has no past surgical history on file. Restrictions  Restrictions/Precautions  Restrictions/Precautions: Fall Risk  Position Activity Restriction  Other position/activity restrictions: aphasia, exp and poss receptive, telemetry  Subjective   General  Chart Reviewed: Yes  Additional Pertinent Hx: Per Dr. Marleny Mason,  52-year-old female who is a known medical history of atrial fibrillation, metastatic adenocarcinoma of the left lung, on chemotherapy who developed the onset of right-sided weakness. 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 CTA of the brain revealed a left MCA occlusion. Patient was taken to interventional radiology for thrombectomy. MRI scan revealed a left MCA distribution infarct. Patient was started on systemic Lovenox 7 days post stroke. Her aspirin was discontinued. Patient was found to have a mass in her heart on echocardiogram in , and this condition disappeared on repeat echo in  after she was treated with chemotherapy and anticoagulation. Patient scheduled for a repeat OC on . Bilateral lower extremity Doppler revealed no DVT. Repeat CT scan of the chest revealed her known lung cancer, and oncology recommended Lovenox for anticoagulation treatment at this time. Patient had been taking Keytruda for her lung cancer. Patient also has diagnosis of COPD, A. Fib, GERD, and known thyroid nodule-benign.    Response To Previous Treatment: Patient with no complaints from previous session. Family / Caregiver Present: No  Referring Practitioner: Dr. Carcamo Channel Dr. Adilson Chun  Subjective  Subjective: Difficulty expressing throughout treatment. General Comment  Comments: Denies pain  Pain Screening  Patient Currently in Pain: Denies  Vital Signs  Patient Currently in Pain: Denies       Orientation  Orientation  Overall Orientation Status: (knew month was april and date was the 4th; said year was 5  )  Cognition      Objective      Transfers  Sit to Stand: Modified independent  Stand to sit: Modified independent  Ambulation  Ambulation?: Yes  Ambulation 1  Surface: level tile;outdoors;ramp  Device: No Device  Assistance: Supervision;Modified Independent  Quality of Gait: Patient ambulated with good hernandez, with minimally decreased clearance on R with occassional shuffle, limited weightshift over R LE with swing through of L LE, tends to ER R LE, generally steady with no loss of balance  Distance: 820' x 1 and short distances around room  Comments: No LOB; [facility] vairous surfaces, up/down ramp and through gait with Supervision.     Stairs/Curb  Stairs?: Yes  Stairs  # Steps : 12  Stairs Height: 6\"  Rails: (1 or no rails)  Device: No Device  Assistance: Supervision  Comment: reciprocal gait pattern on steps curb step with SBA with rail and no loss of balance; up and down outdoor steps of various textures     Balance  Sitting - Static: Good  Sitting - Dynamic: Good  Standing - Static: Good  Standing - Dynamic: Good;-  Exercises  Hip Flexion: x 20 with 3# weight and encouraged Pt. to count outloud; needs cues for a couple of numbers and cues to stop at 20  Knee Long Arc Quad: x 20 with 3# weight and encouraged Pt. to count outloud; needs cues for a couple of numbers and cues to stop at 20  Ankle Pumps: x 20 with 3# weight and encouraged Pt. to count outloud; needs cues for a couple of numbers and cues to stop at 20  Comments: Standing ex's completed outdoors with rail available but Pt. did not need to use:  marching x 20 B; hip abd/add x 20 B and hip ext x 20 B (completed all alternating); heel raises x 20 B;  [place] x 10' x 4 with light CGA as occasionally caught R foot. Other exercises  Other exercises?: Yes  Other exercises 1: NuStep at level 4 resistance x 7 minutes         Comment: Attempted to ask Pt. how room independence was going but Pt. unable to understand              Assessment   Assessment: Pt. is a 64 y/o female with L MCA distribution infarct and is currently being treated for lung CA. Pt. lives with daughter, sister-in-law and grandchildren. Pt. was independent and worke full time. Pt's largest deficit is speech. Pt. improving with functional mobility and is functioning at a Suprevision/Tien level. Pt. given room independence.   Pt. continues to benefit from skilled PT   Treatment Diagnosis: decreased functional mobility  Prognosis: Good  Patient Education: safety with functional mobility  Barriers to Learning: global aphasia  REQUIRES PT FOLLOW UP: Yes  Activity Tolerance  Activity Tolerance: Patient Tolerated treatment well     G-Code     OutComes Score                                                  AM-PAC Score             Goals  Short term goals  Time Frame for Short term goals: 5-7 days  Short term goal 1: transfers independent including bed mobility- met 4/3  Short term goal 2: ambulate community distances without AD independently  Short term goal 3: performed 12 steps with rail with MI  Short term goal 4: curb step with MI  Long term goals  Time Frame for Long term goals : STG = LTG  Patient Goals   Patient goals : Patient's goal is difficult to verbalize unable to verbalize, but did say automatically that wants to get better    Plan    Plan  Times per week: 5-6x  Times per day: Daily  Plan weeks: < 1 week  Specific instructions for Next Treatment: high level balance activities  Current Treatment Recommendations: Strengthening, Balance Training, Functional Mobility Training, Transfer Training, Endurance Training, Gait Training, Stair training, Safety Education & Training, Home Exercise Program, Patient/Caregiver Education & Training, Equipment Evaluation, Education, & procurement  Plan Comment: D/C , Saturday, 4/6  Safety Devices  Type of devices: Gait belt  Restraints  Initially in place: No     Therapy Time   Individual Concurrent Group Co-treatment   Time In 1115         Time Out 1200         Minutes 45         Timed Code Treatment Minutes: [address], [place], [id number]

## 2023-12-21 LAB
ANION GAP SERPL CALCULATED.3IONS-SCNC: 7 MMOL/L (ref 3–16)
BASOPHILS # BLD: 0 K/UL (ref 0–0.2)
BASOPHILS NFR BLD: 0.2 %
BUN SERPL-MCNC: 31 MG/DL (ref 7–20)
CALCIUM SERPL-MCNC: 9.2 MG/DL (ref 8.3–10.6)
CHLORIDE SERPL-SCNC: 107 MMOL/L (ref 99–110)
CO2 SERPL-SCNC: 27 MMOL/L (ref 21–32)
CREAT SERPL-MCNC: 1.3 MG/DL (ref 0.6–1.2)
DEPRECATED RDW RBC AUTO: 14.8 % (ref 12.4–15.4)
EOSINOPHIL # BLD: 0 K/UL (ref 0–0.6)
EOSINOPHIL NFR BLD: 0.4 %
GFR SERPLBLD CREATININE-BSD FMLA CKD-EPI: 41 ML/MIN/{1.73_M2}
GLUCOSE SERPL-MCNC: 95 MG/DL (ref 70–99)
HCT VFR BLD AUTO: 27.5 % (ref 36–48)
HGB BLD-MCNC: 9 G/DL (ref 12–16)
LYMPHOCYTES # BLD: 1.7 K/UL (ref 1–5.1)
LYMPHOCYTES NFR BLD: 25.9 %
MCH RBC QN AUTO: 30.1 PG (ref 26–34)
MCHC RBC AUTO-ENTMCNC: 32.8 G/DL (ref 31–36)
MCV RBC AUTO: 92 FL (ref 80–100)
MONOCYTES # BLD: 0.4 K/UL (ref 0–1.3)
MONOCYTES NFR BLD: 6.6 %
NEUTROPHILS # BLD: 4.3 K/UL (ref 1.7–7.7)
NEUTROPHILS NFR BLD: 66.9 %
PLATELET # BLD AUTO: 166 K/UL (ref 135–450)
PMV BLD AUTO: 9.1 FL (ref 5–10.5)
POTASSIUM SERPL-SCNC: 4.4 MMOL/L (ref 3.5–5.1)
RBC # BLD AUTO: 2.99 M/UL (ref 4–5.2)
SODIUM SERPL-SCNC: 141 MMOL/L (ref 136–145)
WBC # BLD AUTO: 6.4 K/UL (ref 4–11)

## 2023-12-21 PROCEDURE — 97164 PT RE-EVAL EST PLAN CARE: CPT

## 2023-12-21 PROCEDURE — 97530 THERAPEUTIC ACTIVITIES: CPT

## 2023-12-21 PROCEDURE — 2580000003 HC RX 258: Performed by: INTERNAL MEDICINE

## 2023-12-21 PROCEDURE — 99233 SBSQ HOSP IP/OBS HIGH 50: CPT | Performed by: INTERNAL MEDICINE

## 2023-12-21 PROCEDURE — 97535 SELF CARE MNGMENT TRAINING: CPT

## 2023-12-21 PROCEDURE — 36415 COLL VENOUS BLD VENIPUNCTURE: CPT

## 2023-12-21 PROCEDURE — 80048 BASIC METABOLIC PNL TOTAL CA: CPT

## 2023-12-21 PROCEDURE — 97168 OT RE-EVAL EST PLAN CARE: CPT

## 2023-12-21 PROCEDURE — 94761 N-INVAS EAR/PLS OXIMETRY MLT: CPT

## 2023-12-21 PROCEDURE — 94669 MECHANICAL CHEST WALL OSCILL: CPT

## 2023-12-21 PROCEDURE — 6370000000 HC RX 637 (ALT 250 FOR IP): Performed by: INTERNAL MEDICINE

## 2023-12-21 PROCEDURE — 2700000000 HC OXYGEN THERAPY PER DAY

## 2023-12-21 PROCEDURE — 85025 COMPLETE CBC W/AUTO DIFF WBC: CPT

## 2023-12-21 PROCEDURE — 99232 SBSQ HOSP IP/OBS MODERATE 35: CPT | Performed by: PSYCHIATRY & NEUROLOGY

## 2023-12-21 PROCEDURE — 6360000002 HC RX W HCPCS: Performed by: INTERNAL MEDICINE

## 2023-12-21 PROCEDURE — 2060000000 HC ICU INTERMEDIATE R&B

## 2023-12-21 PROCEDURE — 99232 SBSQ HOSP IP/OBS MODERATE 35: CPT | Performed by: INTERNAL MEDICINE

## 2023-12-21 PROCEDURE — 94640 AIRWAY INHALATION TREATMENT: CPT

## 2023-12-21 RX ADMIN — PANTOPRAZOLE SODIUM 40 MG: 40 TABLET, DELAYED RELEASE ORAL at 09:23

## 2023-12-21 RX ADMIN — MUPIROCIN: 20 OINTMENT TOPICAL at 20:36

## 2023-12-21 RX ADMIN — BUSPIRONE HYDROCHLORIDE 5 MG: 5 TABLET ORAL at 16:31

## 2023-12-21 RX ADMIN — SODIUM CHLORIDE, PRESERVATIVE FREE 10 ML: 5 INJECTION INTRAVENOUS at 09:00

## 2023-12-21 RX ADMIN — Medication 2 PUFF: at 19:56

## 2023-12-21 RX ADMIN — HEPARIN SODIUM 5000 UNITS: 5000 INJECTION INTRAVENOUS; SUBCUTANEOUS at 09:22

## 2023-12-21 RX ADMIN — METOPROLOL TARTRATE 25 MG: 25 TABLET, FILM COATED ORAL at 09:22

## 2023-12-21 RX ADMIN — ONDANSETRON 4 MG: 2 INJECTION INTRAMUSCULAR; INTRAVENOUS at 09:25

## 2023-12-21 RX ADMIN — MUPIROCIN: 20 OINTMENT TOPICAL at 09:22

## 2023-12-21 RX ADMIN — ROFLUMILAST 500 MCG: 500 TABLET ORAL at 09:22

## 2023-12-21 RX ADMIN — IPRATROPIUM BROMIDE AND ALBUTEROL SULFATE 1 DOSE: 2.5; .5 SOLUTION RESPIRATORY (INHALATION) at 08:24

## 2023-12-21 RX ADMIN — METOPROLOL TARTRATE 25 MG: 25 TABLET, FILM COATED ORAL at 20:35

## 2023-12-21 RX ADMIN — PREDNISONE 40 MG: 20 TABLET ORAL at 09:23

## 2023-12-21 RX ADMIN — Medication 2 PUFF: at 08:24

## 2023-12-21 RX ADMIN — HEPARIN SODIUM 5000 UNITS: 5000 INJECTION INTRAVENOUS; SUBCUTANEOUS at 20:35

## 2023-12-21 RX ADMIN — ATORVASTATIN CALCIUM 40 MG: 40 TABLET, FILM COATED ORAL at 20:35

## 2023-12-21 RX ADMIN — IPRATROPIUM BROMIDE AND ALBUTEROL SULFATE 1 DOSE: 2.5; .5 SOLUTION RESPIRATORY (INHALATION) at 19:56

## 2023-12-21 RX ADMIN — SODIUM CHLORIDE, PRESERVATIVE FREE 10 ML: 5 INJECTION INTRAVENOUS at 20:35

## 2023-12-21 RX ADMIN — ACETAMINOPHEN 500 MG: 500 TABLET ORAL at 16:23

## 2023-12-21 RX ADMIN — SERTRALINE 150 MG: 100 TABLET, FILM COATED ORAL at 09:22

## 2023-12-21 RX ADMIN — ASPIRIN 81 MG: 81 TABLET, CHEWABLE ORAL at 09:22

## 2023-12-21 ASSESSMENT — PAIN SCALES - GENERAL: PAINLEVEL_OUTOF10: 3

## 2023-12-21 ASSESSMENT — PAIN DESCRIPTION - LOCATION: LOCATION: HEAD

## 2023-12-21 ASSESSMENT — PAIN DESCRIPTION - DESCRIPTORS: DESCRIPTORS: ACHING

## 2023-12-21 NOTE — PROGRESS NOTES
Inpatient Physical Therapy Re-Evaluation    Unit: ICU  Date:  12/21/2023  Patient Name:    Silvano Soler  Admitting diagnosis:  Dizziness [R42]  NADJA (acute kidney injury) [facility]) [N17.9]  Admit Date:  12/15/2023  Precautions/Restrictions/WB Status/ Lines/ Wounds/ Oxygen: Fall risk, Bed/chair alarm, and Lines (IV and Supplemental O2 (4L))      Pt seen for cotreatment this date due to patient safety and limited functional status information    Treatment Time:  0940 - 1020  Treatment Number:  1   Timed Code Treatment Minutes: 30 minutes  Total Treatment Minutes:  40  minutes    Patient Stated Goals for Therapy: \" to go home \"          Discharge Recommendations: Home with 24hr assistance  DME needs for discharge: Needs Met       Therapy recommendation for EMS Transport: can transport by wheelchair    Therapy recommendations for staff:   Assist of 1 for transfers with use of rolling walker (RW) and gait belt to/from [facility]  to/from chair    History of Present Illness:   Per Dr. Brooks Sena:  Silvano Soler is a 81 y.o. female with pmh of hypertension carotid disease A-fib coming in with 5 days of worsening  dizziness with right-sided headache and neck pain. Per her daughter started with urine infection symptoms pain tenderness and sense of dizziness patient received antibiotic as an outpatient did not much improved the dizziness patient developed loose bowel movement does not contain blood and associated with abdominal pain or cramping. In the ED initial vital heart rate 89/min blood pressure 123/74 afebrile  She is so dizzy she can barely sit up in bed. \"    Pt was initially seen on PCU for dizziness, and vestibular symptoms were corrected with Epley Maneuver. The following day a rapid response was called and she was transferred to ICU. We received new orders this morning to evaluate and treat in the ICU.     Home Health S4 Level Recommendation:  NA        AM-PAC Mobility Score    AM-PAC Inpatient Mobility Raw Score : 16         Subjective:  Patient lying reclined in bed with no family present. Pt agreeable to this OT session. Cognition:    A&O x4   Able to follow 2 step commands     Pain:   No  Location:   Rating: NA /10  Pain Medicine Status: Denies need     Preadmission Environment:   Pt. Lives                                              with family (son and his girlfriend)  Home environment:                            one story home  Steps to enter first floor:                     1 steps to enter  Laundry:                                              1st floor  Bathroom:                                           tub/shower unit and tub transfer bench  Pt sleeps in a:                                     Hospital bed  Equipment owned:                              RW, SPC, manual WC, electric scooter, lift chair, and home O2 (2.5L) continous     Preadmission Status:  Pt. Able to drive:                                 No  Pt. Fully independent with ADLs:       Yes  Pt. Required assistance for:               Cleaning, Cooking, and Laundry   Pt. independent for functional transfers and utilized No Device for mobility in home and No Device out in community  History of falls:                                                No  Home Health Services:                       Pt has paid caregiver 9-6 M-F, and son stays with her at nights. Objective  Does this pt have an acute or acute on chronic diagnosis of CHF? No    Upper Extremity ROM/Strength  Please see OT evaluation. Lower Extremity ROM / Strength   AROM WFL: Yes  ROM limitations:         Formal strength testing deferred due to focus on BPPV assessment.    R LE   Quad   Not tested   Ant Tib  Not tested   Hamstring Not tested   Iliopsoas Not tested  L LE  Quad   Not tested   Ant Tib  Not tested   Hamstring Not tested   Iliopsoas Not tested    Lower Extremity Sensation    NT    Coordination  NT    Tone  Not Tested    Balance  Static Sitting:  Good ; CGA  Dynamic

## 2023-12-21 NOTE — PROGRESS NOTES
Physical/Occupational Therapy    Pt transferred to ICU, will need new PT/OT orders when appropriate.     Saranya Dior, PT, DPT   Antonella Knott, OTR/L [id number]

## 2023-12-21 NOTE — PROGRESS NOTES
AM assessment done. Pt is alert & oriented. O2 decreased to 2 liters NC. Pt is resting w/out evidence of distress @ this time.

## 2023-12-21 NOTE — PLAN OF CARE
Problem: Chronic Conditions and Co-morbidities  Goal: Patient's chronic conditions and co-morbidity symptoms are monitored and maintained or improved  Outcome: Progressing   CHF Care Plan      Patient's EF (Ejection Fraction) is greater than 40%    Heart Failure Medications:  Diuretics[de-identified] None    (One of the following REQUIRED for EF </= 40%/SYSTOLIC FAILURE but MAY be used in EF% >40%/DIASTOLIC FAILURE)        ACE[de-identified] None        ARB[de-identified] None         ARNI[de-identified] None    (Beta Blockers)  NON- Evidenced Based Beta Blocker (for EF% >40%/DIASTOLIC FAILURE): Metoprolol TARTrate- Lopressor    Evidenced Based Beta Blocker::(REQUIRED for EF% <40%/SYSTOLIC FAILURE) None  . .................................................................................................................................................. Patient's weights and intake/output reviewed    Daily Weight log at bedside, patient/family participation in use of log: no    Patient's current weight today shows a difference of 0 lbs less than last documented weight. Intake/Output Summary (Last 24 hours) at 12/21/2023 0440  Last data filed at 12/21/2023 0435  Gross per 24 hour   Intake 900 ml   Output 460 ml   Net 440 ml       Education Booklet Provided: no    Comorbidities Reviewed Yes    Patient has a past medical history of NADJA (acute kidney injury) ([address]), Asthma, Atrial fibrillation with RVR ([address]), CAD (coronary artery disease), CHF (congestive heart failure) ([address]), Chronic anxiety, COPD (chronic obstructive pulmonary disease) ([address]), COPD (chronic obstructive pulmonary disease) ([address]), GERD (gastroesophageal reflux disease), Heart attack ([address]), History of blood transfusion, Hyperlipidemia, Hypertension, MRSA (methicillin resistant staph aureus) culture positive, OA (osteoarthritis), and Thyroid disease.      >>For CHF and Comorbidity documentation on Education Time and Topics, please see Education Tab    Progressive Mobility Assessment:  What is this

## 2023-12-21 NOTE — PROGRESS NOTES
CM-SR w/freq PAC's, VSS, pt remains afebrile, voiding w/out difficulty. Pt up to bedside chair. O2 is 2 liters per NC and BiPap PRN.

## 2023-12-21 NOTE — PROGRESS NOTES
Neurology Progress Note    ID: Lucero Owens is a 81 y.o. female    : 1940     LOS: 6 days     ASSESSMENT    1. Acute vestibular syndrome. 2.  Left hearing loss. 3.  Generalized brain atrophy. 4.  Underlying atrial fibrillation. The patient has no focality except broken pursuit from my assessment. The CT brain showed prominent brain atrophy without extensive white matter changes or intracerebral hemorrhage. MRI brain is pending. From neurology perspective, the patient is suspicious to have left lateral vertigo. However, the CT brain shows significant brain atrophy. This can be central vertigo with superimposed peripheral vertigo. The patient may need VNG to differentiate central etiology. 23: The patient developed shortness of breath with tachycardia overnight. The patient was transferred to ICU for observation. The patient denies significant dizziness during my assessment. The patient also denies new focal neurological deficit. MRI brain is still pending. 23: The patient is neurologically stabilized. The patient denies significant dizziness today. MRI brain is pending. 23: The patient is neurologically stabilized. The patient remains on BiPAP treatment. MRI brain is pending. Plan:     1. MRI brain to exclude CVA. 2.  Target blood pressure below 140/90.  3.  PT/OT/ST. 4.  Agree with aspirin and statin for now. 5.  Continue cardiac telemetry.     Medications:  Scheduled Meds:    predniSONE  40 mg Oral Daily    mupirocin   Each Nostril BID    ipratropium 0.5 mg-albuterol 2.5 mg  1 Dose Inhalation BID RT    sodium chloride flush  5-40 mL IntraVENous 2 times per day    heparin (porcine)  5,000 Units SubCUTAneous BID    aspirin  81 mg Oral Daily    atorvastatin  40 mg Oral Nightly    melatonin  5 mg Oral Nightly    metoprolol tartrate  25 mg Oral BID    pantoprazole  40 mg Oral Daily    Roflumilast  500 mcg Oral Daily    sertraline  150 mg Oral Daily    budesonide-formoterol  2 puff Inhalation BID RT     Continuous Infusions:    sodium chloride       PRN Meds: ipratropium 0.5 mg-albuterol 2.5 mg, meclizine, acetaminophen, ondansetron, sodium chloride flush, sodium chloride, magnesium sulfate, polyethylene glycol, albuterol, busPIRone, fluticasone    OBJECTIVE  Vital signs in the last 24 hours:  Vitals:    12/21/23 1500   BP: 130/77   Pulse: 77   Resp: 26   Temp:    SpO2: 97%       Intake/Output last 3 shifts:  I/O last 3 completed shifts:  In: 1020 [P.O.:1020]  Out: 660 [Urine:660]    Intake/Output this shift:  No intake/output data recorded.    Yesterday's Weight:  Wt Readings from Last 1 Encounters:   12/21/23 53.1 kg (117 lb 1 oz)       SUBJECTIVE  There was no acute event overnight.    ROS:  Negative except in subjective.    Neurological examination:  MENTAL STATUS:  Alert and oriented to person.  LANG/SPEECH: No dysarthria.  CRANIAL NERVES: No facial asymmetry.  MOTOR: No pronator drift or leg drift.  REFLEXES: Generalized 2+.  SENSORY: Grossly intact.  COORD: No tremor.    Labs and Imaging:  I reviewed labs and brain imaging.    35 minutes were spent with the patient with greater than 50% of the time spent in counseling and coordination of care.    Mayela Marinelli MD

## 2023-12-21 NOTE — PROGRESS NOTES
Inpatient Occupational Therapy Re-Evaluation and Treatment    Unit: ICU  Date:  12/21/2023  Patient Name:    Alex Guillen  Admitting diagnosis:  Dizziness [R42]  NADJA (acute kidney injury) [facility]) [N17.9]  Admit Date:  12/15/2023  Precautions/Restrictions/WB Status/ Lines/ Wounds/ Oxygen: Fall risk, Bed/chair alarm, Telemetry, Continuous Pulse Oximetry, and Lines (IV and Supplemental O2 (4L))     Patient transferred 2W to ICU on 12/18 s/p rapid response, elevated HR and BP. Reordered 12/21/23 for re-evaluation on ICU. Pt seen for cotreatment this date due to patient safety and limited functional status information    Treatment Time:  877-2202  Treatment Number:  1  Timed Code Treatment Minutes: 30 minutes  Total Treatment Minutes:  40  minutes    Patient Goals for Therapy: \"go home \"          Discharge Recommendations: SNF  DME needs for discharge: Defer to facility    If going home despite SNF recommendation will need 24 hour physical assist and HHOT. Therapy recommendations for staff:   Assist of 1 for transfers with use of rolling walker (RW) and gait belt to/from chair    History of Present Illness: Per Bayron Castillo MD H&P 12/15/23: Alex Guillen is a 81 y.o. female with pmh of hypertension carotid disease A-fib coming in with 5 days of worsening  dizziness with right-sided headache and neck pain. Per her daughter started with urine infection symptoms pain tenderness and sense of dizziness patient received antibiotic as an outpatient did not much improved the dizziness patient developed loose bowel movement does not contain blood and associated with abdominal pain or cramping. Pt's dizziness is further explained as \"the room is spinning\". Pt states that her dizziness started on Sunday along with increased pain in her cervical spine, and pain and \"fullness\" within her R ear. Pt also complains of visual deficits at baseline, and auditory deficits in her L ear at baseline.      In the ED initial vital heart rate 89/min blood pressure 123/74 afebrile  She is so dizzy she can barely sit up in bed.\"    Home Health S4 Level Recommendation:  Level 3 Safety if going home    AM-PAC Score: AM-PAC Inpatient Daily Activity Raw Score: 19     Subjective:  Patient lying reclined in bed with no family present.   Pt agreeable to this OT session.     Cognition:    A&O x4   Able to follow 2 step commands    Pain:   No  Location:   Rating: NA /10  Pain Medicine Status: Denies need    Preadmission Environment:   Pt. Lives     with family (son and his girlfriend)  Home environment:    one story home  Steps to enter first floor:   1 steps to enter  Laundry:     1st floor  Bathroom:     tub/shower unit and tub transfer bench  Pt sleeps in a:    Hospital bed  Equipment owned:    RW, SPC, manual WC, electric scooter, lift chair, and home O2 (2.5L) continous    Preadmission Status:  Pt. Able to drive:    No  Pt. Fully independent with ADLs:  Yes  Pt. Required assistance for:   Cleaning, Cooking, and Laundry   Pt. independent for functional transfers and utilized No Device for mobility in home and No Device out in community  History of falls:    No  Home Health Services:  None  Per CM note: \"Has paid caregivers M-F 9 AM to 6 PM and her son stays with her at night. She plans to return home and resume prior care.\"     Objective:  Does this pt have an acute or acute on chronic diagnosis of CHF? No    Upper Extremity ROM:    WFL,  pt able to perform all bed mobility, transfers, and gait without ROM limitation.    Upper Extremity Strength:    BUE strength WFL, but not formally assessed w/ MMT    Upper Extremity Sensation:    WFL    Upper Extremity Proprioception:  WFL    Coordination:  Diminished    Tone:  WFL    Balance:  Sitting:    Supervision on BSC  Standing:    CGA with RW, gait belt    Bed Mobility:  Supine to Sit:    Supervision and HOB elevated to mid-range  Sit to Supine:   Not Tested  Rolling:   Not Tested  Scooting in

## 2023-12-21 NOTE — PROGRESS NOTES
Pt up to chair with PT. Pt does get SOB with activity. Pt was up in chair X 1 hr. O2 @ 2 liters per NC nd BiPap PRN. Pt is resting in bed on BiPap @ this time.

## 2023-12-21 NOTE — FLOWSHEET NOTE
12/20/23 2005   Assessment   Charting Type Shift assessment   Psychosocial   Psychosocial (WDL) WDL   Neurological   Neuro (WDL) WDL   Level of Consciousness 0   Orientation Level Oriented X4   Cognition Appropriate judgement;Poor judgement; Follows commands   Speech Clear   [place] Coma Scale   Eye Opening 4   Best Verbal Response 5   Best Motor Response 6   [place] Coma Scale Score 15   HEENT (Head, Ears, Eyes, Nose, & Throat)   HEENT (WDL) WDL   Respiratory   Respiratory (WDL) X  (SOB with exertion)   Respiratory Pattern Regular   Respiratory Depth Normal   Respiratory Quality/Effort Dyspnea with exertion   Chest Assessment Trachea midline; Chest expansion symmetrical   L Breath Sounds Diminished   R Breath Sounds Diminished   Breath Sounds   Breath Sounds Bilateral Diminished   Right Upper Lobe Diminished   Right Middle Lobe Diminished   Right Lower Lobe Diminished   Left Upper Lobe Diminished   Left Lower Lobe Diminished   Cough/Sputum   Sputum How Obtained Spontaneous cough   Cough Non-productive   Cardiac   Cardiac (WDL) WDL   Cardiac Regularity Regular   Heart Sounds S1, S2   Cardiac Rhythm Sinus rhythm;Sinus rhythm with PAC   Rhythm Interpretation   Pulse 87   Cardiac Monitor   Telemetry Box Number ICU 11   Telemetry Monitor Alarm Parameters ICU   Gastrointestinal   Abdominal (WDL) WDL   Abdomen Inspection Soft   RUQ Bowel Sounds Active   LUQ Bowel Sounds Active   RLQ Bowel Sounds Active   LLQ Bowel Sounds Active   Tenderness Nontender; Soft   Genitourinary   Genitourinary (WDL) X  (purewick)   Suprapubic Tenderness No   Dysuria (Pain/Burning w/Urination) No   Difficulty Urinating/Starting Stream No   Urine Frequency   Urine Frequency No   Urine Urgency   Urine Urgency No   Urine Assessment   Urine Color Yellow/straw   Urine Appearance Clear   Urine Odor No odor   Peripheral Vascular   Peripheral Vascular (WDL) WDL   RUE Neurovascular Assessment   Capillary Refill Less than/Equal to 3 seconds   Color

## 2023-12-21 NOTE — PLAN OF CARE
Problem: Chronic Conditions and Co-morbidities  Goal: Patient's chronic conditions and co-morbidity symptoms are monitored and maintained or improved  12/21/2023 1336 by Franco Arreguin RN  Outcome: Progressing  12/21/2023 0440 by Duncan Barros RN  Outcome: Progressing     Problem: Safety - Adult  Goal: Free from fall injury  Outcome: Progressing

## 2023-12-22 VITALS
HEIGHT: 63 IN | OXYGEN SATURATION: 94 % | TEMPERATURE: 97 F | RESPIRATION RATE: 20 BRPM | WEIGHT: 110.89 LBS | BODY MASS INDEX: 19.65 KG/M2 | HEART RATE: 94 BPM | DIASTOLIC BLOOD PRESSURE: 83 MMHG | SYSTOLIC BLOOD PRESSURE: 141 MMHG

## 2023-12-22 LAB
ANION GAP SERPL CALCULATED.3IONS-SCNC: 9 MMOL/L (ref 3–16)
BASOPHILS # BLD: 0 K/UL (ref 0–0.2)
BASOPHILS NFR BLD: 0.1 %
BUN SERPL-MCNC: 27 MG/DL (ref 7–20)
CALCIUM SERPL-MCNC: 9.5 MG/DL (ref 8.3–10.6)
CHLORIDE SERPL-SCNC: 108 MMOL/L (ref 99–110)
CO2 SERPL-SCNC: 26 MMOL/L (ref 21–32)
CREAT SERPL-MCNC: 1.3 MG/DL (ref 0.6–1.2)
DEPRECATED RDW RBC AUTO: 14.4 % (ref 12.4–15.4)
EOSINOPHIL # BLD: 0 K/UL (ref 0–0.6)
EOSINOPHIL NFR BLD: 0.2 %
GFR SERPLBLD CREATININE-BSD FMLA CKD-EPI: 41 ML/MIN/{1.73_M2}
GLUCOSE SERPL-MCNC: 120 MG/DL (ref 70–99)
HCT VFR BLD AUTO: 29.6 % (ref 36–48)
HGB BLD-MCNC: 9.7 G/DL (ref 12–16)
LYMPHOCYTES # BLD: 1.4 K/UL (ref 1–5.1)
LYMPHOCYTES NFR BLD: 23.3 %
MCH RBC QN AUTO: 30.4 PG (ref 26–34)
MCHC RBC AUTO-ENTMCNC: 32.8 G/DL (ref 31–36)
MCV RBC AUTO: 92.5 FL (ref 80–100)
MONOCYTES # BLD: 0.3 K/UL (ref 0–1.3)
MONOCYTES NFR BLD: 5 %
NEUTROPHILS # BLD: 4.2 K/UL (ref 1.7–7.7)
NEUTROPHILS NFR BLD: 71.4 %
PLATELET # BLD AUTO: 186 K/UL (ref 135–450)
PMV BLD AUTO: 9 FL (ref 5–10.5)
POTASSIUM SERPL-SCNC: 4.2 MMOL/L (ref 3.5–5.1)
RBC # BLD AUTO: 3.2 M/UL (ref 4–5.2)
SODIUM SERPL-SCNC: 143 MMOL/L (ref 136–145)
WBC # BLD AUTO: 5.9 K/UL (ref 4–11)

## 2023-12-22 PROCEDURE — 6360000002 HC RX W HCPCS: Performed by: INTERNAL MEDICINE

## 2023-12-22 PROCEDURE — 94640 AIRWAY INHALATION TREATMENT: CPT

## 2023-12-22 PROCEDURE — 2580000003 HC RX 258: Performed by: INTERNAL MEDICINE

## 2023-12-22 PROCEDURE — 6370000000 HC RX 637 (ALT 250 FOR IP): Performed by: INTERNAL MEDICINE

## 2023-12-22 PROCEDURE — 36415 COLL VENOUS BLD VENIPUNCTURE: CPT

## 2023-12-22 PROCEDURE — 99232 SBSQ HOSP IP/OBS MODERATE 35: CPT | Performed by: PSYCHIATRY & NEUROLOGY

## 2023-12-22 PROCEDURE — 85025 COMPLETE CBC W/AUTO DIFF WBC: CPT

## 2023-12-22 PROCEDURE — 80048 BASIC METABOLIC PNL TOTAL CA: CPT

## 2023-12-22 PROCEDURE — 94761 N-INVAS EAR/PLS OXIMETRY MLT: CPT

## 2023-12-22 PROCEDURE — 99239 HOSP IP/OBS DSCHRG MGMT >30: CPT | Performed by: INTERNAL MEDICINE

## 2023-12-22 PROCEDURE — 99233 SBSQ HOSP IP/OBS HIGH 50: CPT | Performed by: INTERNAL MEDICINE

## 2023-12-22 PROCEDURE — 2700000000 HC OXYGEN THERAPY PER DAY

## 2023-12-22 PROCEDURE — 94669 MECHANICAL CHEST WALL OSCILL: CPT

## 2023-12-22 RX ORDER — BUSPIRONE HYDROCHLORIDE 5 MG/1
5 TABLET ORAL 3 TIMES DAILY
Status: DISCONTINUED | OUTPATIENT
Start: 2023-12-22 | End: 2023-12-22 | Stop reason: HOSPADM

## 2023-12-22 RX ORDER — MECLIZINE HCL 12.5 MG/1
12.5 TABLET ORAL 3 TIMES DAILY PRN
Qty: 21 TABLET | Refills: 0 | Status: SHIPPED | OUTPATIENT
Start: 2023-12-22 | End: 2023-12-29

## 2023-12-22 RX ORDER — PREDNISONE 10 MG/1
TABLET ORAL
Qty: 18 TABLET | Refills: 0 | Status: ON HOLD | OUTPATIENT
Start: 2023-12-23 | End: 2024-01-08 | Stop reason: HOSPADM

## 2023-12-22 RX ADMIN — IPRATROPIUM BROMIDE AND ALBUTEROL SULFATE 1 DOSE: 2.5; .5 SOLUTION RESPIRATORY (INHALATION) at 08:15

## 2023-12-22 RX ADMIN — SODIUM CHLORIDE, PRESERVATIVE FREE 10 ML: 5 INJECTION INTRAVENOUS at 09:32

## 2023-12-22 RX ADMIN — BUSPIRONE HYDROCHLORIDE 5 MG: 5 TABLET ORAL at 10:36

## 2023-12-22 RX ADMIN — ONDANSETRON 4 MG: 2 INJECTION INTRAMUSCULAR; INTRAVENOUS at 11:21

## 2023-12-22 RX ADMIN — MUPIROCIN: 20 OINTMENT TOPICAL at 09:20

## 2023-12-22 RX ADMIN — Medication 2 PUFF: at 08:15

## 2023-12-22 RX ADMIN — PREDNISONE 40 MG: 20 TABLET ORAL at 09:19

## 2023-12-22 RX ADMIN — BUSPIRONE HYDROCHLORIDE 5 MG: 5 TABLET ORAL at 13:59

## 2023-12-22 RX ADMIN — SERTRALINE 150 MG: 100 TABLET, FILM COATED ORAL at 09:20

## 2023-12-22 RX ADMIN — BUSPIRONE HYDROCHLORIDE 5 MG: 5 TABLET ORAL at 06:24

## 2023-12-22 RX ADMIN — ROFLUMILAST 500 MCG: 500 TABLET ORAL at 09:19

## 2023-12-22 RX ADMIN — HEPARIN SODIUM 5000 UNITS: 5000 INJECTION INTRAVENOUS; SUBCUTANEOUS at 09:20

## 2023-12-22 RX ADMIN — PANTOPRAZOLE SODIUM 40 MG: 40 TABLET, DELAYED RELEASE ORAL at 09:19

## 2023-12-22 RX ADMIN — ASPIRIN 81 MG: 81 TABLET, CHEWABLE ORAL at 09:20

## 2023-12-22 RX ADMIN — METOPROLOL TARTRATE 25 MG: 25 TABLET, FILM COATED ORAL at 09:20

## 2023-12-22 ASSESSMENT — PAIN SCALES - GENERAL: PAINLEVEL_OUTOF10: 0

## 2023-12-22 NOTE — PROGRESS NOTES
4 Eyes Skin Assessment     NAME:  Fabian Smith  YOB: 1940  MEDICAL RECORD NUMBER:  [id number]    The patient is being assessed for  Shift Handoff    I agree that at least one RN has performed a thorough Head to Toe Skin Assessment on the patient. ALL assessment sites listed below have been assessed. Areas assessed by both nurses:    Head, Face, Ears, Shoulders, Back, Chest, Arms, Elbows, Hands, Sacrum. Buttock, Coccyx, Ischium, Legs. Feet and Heels, and Under Medical Devices         Does the Patient have a Wound?  No noted wound(s)       Trey Prevention initiated by RN: Yes  Wound Care Orders initiated by RN: No    Pressure Injury (Stage 3,4, Unstageable, DTI, NWPT, and Complex wounds) if present, place Wound referral order by RN under : No    New Ostomies, if present place, Ostomy referral order under : No     Nurse 1 eSignature: Electronically signed by Krish Cervantes RN on 12/22/23 at 2:37 AM EST    **SHARE this note so that the co-signing nurse can place an eSignature**    Nurse 2 eSignature: Electronically signed by Yovanny Heller RN on 12/22/23 at 3:08 AM EST

## 2023-12-22 NOTE — PROGRESS NOTES
Neurology Progress Note    ID: Mariel Huerta is a 81 y.o. female    : 1940     LOS: 7 days     ASSESSMENT    1. Acute vestibular syndrome. 2.  Left hearing loss. 3.  Generalized brain atrophy. 4.  Underlying atrial fibrillation. The patient has no focality except broken pursuit from my assessment. The CT brain showed prominent brain atrophy without extensive white matter changes or intracerebral hemorrhage. MRI brain is pending. From neurology perspective, the patient is suspicious to have left lateral vertigo. However, the CT brain shows significant brain atrophy. This can be central vertigo with superimposed peripheral vertigo. The patient may need VNG to differentiate central etiology. 23: The patient developed shortness of breath with tachycardia overnight. The patient was transferred to ICU for observation. The patient denies significant dizziness during my assessment. The patient also denies new focal neurological deficit. MRI brain is still pending. 23: The patient is neurologically stabilized. The patient denies significant dizziness today. MRI brain is pending. 23: The patient is neurologically stabilized. The patient remains on BiPAP treatment. MRI brain is pending. 23: The patient remains to have significant shortness of breath. The patient denies significant dizziness. Plan:     1. MRI brain to exclude CVA. 2.  Target blood pressure below 140/90.  3.  PT/OT/ST. 4.  Agree with aspirin and statin for now. 5.  Continue cardiac telemetry.     Medications:  Scheduled Meds:    predniSONE  40 mg Oral Daily    mupirocin   Each Nostril BID    ipratropium 0.5 mg-albuterol 2.5 mg  1 Dose Inhalation BID RT    sodium chloride flush  5-40 mL IntraVENous 2 times per day    heparin (porcine)  5,000 Units SubCUTAneous BID    aspirin  81 mg Oral Daily    atorvastatin  40 mg Oral Nightly    melatonin  5 mg Oral Nightly    metoprolol

## 2023-12-22 NOTE — PROGRESS NOTES
Patient wanted to speak to MD prior to discharge. Patient talked to Dr. Vaishali Chappell on phone, understood right to refuse discharge. Patient stated \"I know, I am going home\". Discharge instructions reviewed, Medications delivered to bedside, IV's removed. Patient put on her home O2. Oxygen saturation 100%, lung sounds baseline at time of discharge. Daughter unhappy, cussing.

## 2023-12-22 NOTE — PLAN OF CARE
Problem: Discharge Planning  Goal: Discharge to home or other facility with appropriate resources  12/22/2023 1243 by aT Colón RN  Outcome: Progressing  12/22/2023 0051 by Kuldeep Griffiths RN  Outcome: Progressing     Problem: Safety - Adult  Goal: Free from fall injury  12/22/2023 1243 by Ta Colón RN  Outcome: Progressing  12/22/2023 0051 by Kuldeep Griffiths RN  Outcome: Progressing     Problem: Chronic Conditions and Co-morbidities  Goal: Patient's chronic conditions and co-morbidity symptoms are monitored and maintained or improved  12/22/2023 1243 by Ta Colón RN  Outcome: Progressing  12/22/2023 0051 by Kuldeep Griffiths RN  Outcome: Progressing

## 2023-12-22 NOTE — PROGRESS NOTES
Shift assessment done,patient is awake,a bit anxious ,alert and well oriented X 4. She is on her home BIPAP and is well tolerating it,has diminished breath sounds bilaterally. She is on a regular diet and tolerates oral fluids well. She is able to turn herself in bed. She has an external catheter for urine output. Bed is at its lowest level,call light within reach,will continue to monitor patient.

## 2023-12-22 NOTE — PLAN OF CARE
Problem: Discharge Planning  Goal: Discharge to home or other facility with appropriate resources  Outcome: Progressing     Problem: Safety - Adult  Goal: Free from fall injury  12/22/2023 0051 by Jacqueline Catalan RN  Outcome: Progressing  12/21/2023 1336 by Kaylen Cerrato RN  Outcome: Progressing     Problem: Chronic Conditions and Co-morbidities  Goal: Patient's chronic conditions and co-morbidity symptoms are monitored and maintained or improved  12/22/2023 0051 by Jacqueline Catalan RN  Outcome: Progressing  12/21/2023 1336 by Kaylen Cerrato RN  Outcome: Progressing     Problem: Skin/Tissue Integrity  Goal: Absence of new skin breakdown  Description: 1. Monitor for areas of redness and/or skin breakdown  2. Assess vascular access sites hourly  3. Every 4-6 hours minimum:  Change oxygen saturation probe site  4. Every 4-6 hours:  If on nasal continuous positive airway pressure, respiratory therapy assess nares and determine need for appliance change or resting period.   Outcome: Progressing     Problem: ABCDS Injury Assessment  Goal: Absence of physical injury  Outcome: Progressing     Problem: Cardiovascular - Adult  Goal: Maintains optimal cardiac output and hemodynamic stability  Outcome: Progressing  Goal: Absence of cardiac dysrhythmias or at baseline  Outcome: Progressing     Problem: Pain  Goal: Verbalizes/displays adequate comfort level or baseline comfort level  Outcome: Progressing

## 2023-12-22 NOTE — PROGRESS NOTES
Reassessment done,patient is asleep,more calm,on her home BIPAP and is well tolerating it. She is on a regular diet. She is able to turn herself in bed. No changes in reassessment.   Bed is at its lowest level,call light within reach,will continue to monitor patient

## 2023-12-22 NOTE — PROGRESS NOTES
Shift assessment complete. See flow sheet. Scheduled meds given. See MAR    Patients head-toe complete, VS are logged, and active bowel sound noted in all four quadrants    No further needs  noted at this time. Call light and bedside table are within reach. The bed is locked and is in the lowest position.

## 2023-12-22 NOTE — PROGRESS NOTES
Reassessment done,patient is on her home BIPAP,well tolerating it. She has diminished breath sounds bilaterally. She is able to turn herself in bed. She is on a regular diet. She has an external catheter in for urine output. No changes in reassessment. Bed is at its lowest level,call light within reach,will continue to monitor patient.

## 2023-12-22 NOTE — PROGRESS NOTES
Care Plan, Education, Fall Risk, Trey Scale complete. Patient is resting and reports no needs at this time.

## 2023-12-22 NOTE — DISCHARGE SUMMARY
Name:  Avis Gilbert  Room:  3011/3011-01  MRN:    [id number]    Discharge Summary      This discharge summary is in conjunction with a complete physical exam done on the day of discharge. Discharging Physician: Kay Scott MD      Admit: 12/15/2023  Discharge:  12/22/2023     Diagnoses this Admission    Principal Problem:    Dizziness  Active Problems:    NADJA (acute kidney injury) ([address])    Acute on chronic hypoxic respiratory failure (HCC)    Atrial fibrillation (HCC)    Hearing loss    Hypotension  Resolved Problems:    * No resolved hospital problems. *          Procedures (Please Review Full Report for Details)      Consults    IP CONSULT TO RESPIRATORY CARE  IP CONSULT TO NEUROLOGY  IP CONSULT TO OTOLARYNGOLOGY      HPI:  Cindy Calloway is a 81 y.o. female with pmh of hypertension carotid disease A-fib coming in with 5 days of worsening  dizziness with right-sided headache and neck pain. Per her daughter started with urine infection symptoms pain tenderness and sense of dizziness patient received antibiotic as an outpatient did not much improved the dizziness patient developed loose bowel movement does not contain blood and associated with abdominal pain or cramping  In the ED initial vital heart rate 89/min blood pressure 123/74 afebrile  She is so dizzy she can barely sit up in bed. Hospital Course    Dizziness, vertigo   - R/O TIA, possible orthostasis, possible meniere's disease, BPPV . -Off Xarelto because of the visit of GI bleed and anemia  -CT head without contrast: Moderate cortical atrophy and mild chronic microischemic changes scattered in, the deep white matter with no acute intracranial abnormality seen  -CTA: not done due to elevated crtn  - check MRI brain - hasn't had this yet   -telemetry monitoring   -Neurocheck every 4  -Aspirin and statin  -await Neurology  and ENT consult  -Lipid profile , A1c  - hydrate.  Monitor orthostats  - PT, OT eval for BPPV done PROMETHAZINE-DM               Where to Get Your Medications        These medications were sent to [address], [address] [place] [address] & [place] [address]      Phone: [phone]   meclizine 12.5 MG tablet  metoprolol tartrate 25 MG tablet  predniSONE 10 MG tablet       Pt/ot    Discharge Condition/Location: Stable to home     Follow Up: Follow up with PCP.     Total time spent on discharge is 35 minutes          Marti Long MD 12/22/2023 10:45 AM

## 2023-12-22 NOTE — CARE COORDINATION
Met with patient at bedside to discuss IMM. Patient's son is at bedside. Patient sitting on the side of the bed, appears to be - not feeling well. CM explained the IMM. The son asked the patient if she is ready to go home and she very loudly said \"NO\". CM advised she will come back a little later.

## 2023-12-22 NOTE — PROGRESS NOTES
HEART FAILURE CARE PLAN:    Comorbidities Reviewed: Yes   Patient has a past medical history of NADJA (acute kidney injury) ([address]), Asthma, Atrial fibrillation with RVR ([address]), CAD (coronary artery disease), CHF (congestive heart failure) ([address]), Chronic anxiety, COPD (chronic obstructive pulmonary disease) ([address]), COPD (chronic obstructive pulmonary disease) ([address]), GERD (gastroesophageal reflux disease), Heart attack ([address]), History of blood transfusion, Hyperlipidemia, Hypertension, MRSA (methicillin resistant staph aureus) culture positive, OA (osteoarthritis), and Thyroid disease. ECHOCARDIOGRAM Reviewed: Yes   Patient's Ejection Fraction (EF) is greater than 40%    Weights Reviewed: Yes   Admission weight: 51.1 kg (112 lb 10.5 oz)   Wt Readings from Last 3 Encounters:   12/21/23 53.1 kg (117 lb 1 oz)   11/08/23 49.4 kg (109 lb)   09/14/23 49.5 kg (109 lb 3.2 oz)     Intake & Output Reviewed: Yes     Intake/Output Summary (Last 24 hours) at 12/22/2023 0235  Last data filed at 12/22/2023 0000  Gross per 24 hour   Intake 340 ml   Output 550 ml   Net -210 ml     Medications Reviewed: Yes   SCHEDULED HOSPITAL MEDICATIONS:   predniSONE  40 mg Oral Daily    mupirocin   Each Nostril BID    ipratropium 0.5 mg-albuterol 2.5 mg  1 Dose Inhalation BID RT    sodium chloride flush  5-40 mL IntraVENous 2 times per day    heparin (porcine)  5,000 Units SubCUTAneous BID    aspirin  81 mg Oral Daily    atorvastatin  40 mg Oral Nightly    melatonin  5 mg Oral Nightly    metoprolol tartrate  25 mg Oral BID    pantoprazole  40 mg Oral Daily    Roflumilast  500 mcg Oral Daily    sertraline  150 mg Oral Daily    budesonide-formoterol  2 puff Inhalation BID RT     ACE/ARB/ARNI is REQUIRED for EF </= 47% SYSTOLIC FAILURE:   ACE[de-identified] None  ARB[de-identified] None  ARNI[de-identified] None    Evidenced-Based Beta Blocker is REQUIRED for EF </= 08% SYSTOLIC FAILURE:   [de-identified] Metoprolol SUCCinate- Toprol XL    Diuretics:  [de-identified] None    Diet Reviewed: Yes   ADULT DIET;  Regular;

## 2024-01-01 RX ORDER — GABAPENTIN 100 MG/1
100 CAPSULE ORAL 3 TIMES DAILY
Qty: 90 CAPSULE | Refills: 0 | OUTPATIENT
Start: 2024-01-01

## 2024-01-02 ENCOUNTER — APPOINTMENT (OUTPATIENT)
Dept: GENERAL RADIOLOGY | Age: 84
DRG: 871 | End: 2024-01-02
Payer: MEDICARE

## 2024-01-02 ENCOUNTER — HOSPITAL ENCOUNTER (INPATIENT)
Age: 84
LOS: 7 days | Discharge: NURSING, REHAB OR LONG-TERM CARE | DRG: 871 | End: 2024-01-09
Attending: STUDENT IN AN ORGANIZED HEALTH CARE EDUCATION/TRAINING PROGRAM | Admitting: INTERNAL MEDICINE
Payer: MEDICARE

## 2024-01-02 DIAGNOSIS — N17.9 AKI (ACUTE KIDNEY INJURY) (HCC): ICD-10-CM

## 2024-01-02 DIAGNOSIS — R06.02 SHORTNESS OF BREATH: ICD-10-CM

## 2024-01-02 DIAGNOSIS — R19.7 DIARRHEA, UNSPECIFIED TYPE: ICD-10-CM

## 2024-01-02 DIAGNOSIS — R53.1 GENERALIZED WEAKNESS: ICD-10-CM

## 2024-01-02 DIAGNOSIS — J96.11 CHRONIC HYPOXEMIC RESPIRATORY FAILURE (HCC): ICD-10-CM

## 2024-01-02 DIAGNOSIS — U07.1 COVID-19 VIRUS INFECTION: Primary | ICD-10-CM

## 2024-01-02 LAB
ALBUMIN SERPL-MCNC: 3.5 G/DL (ref 3.4–5)
ALBUMIN/GLOB SERPL: 0.9 {RATIO} (ref 1.1–2.2)
ALP SERPL-CCNC: 94 U/L (ref 40–129)
ALT SERPL-CCNC: <5 U/L (ref 10–40)
ANION GAP SERPL CALCULATED.3IONS-SCNC: 15 MMOL/L (ref 3–16)
AST SERPL-CCNC: 11 U/L (ref 15–37)
BASE EXCESS BLDV CALC-SCNC: 7 MMOL/L (ref -3–3)
BASOPHILS # BLD: 0 K/UL (ref 0–0.2)
BASOPHILS NFR BLD: 0.2 %
BILIRUB SERPL-MCNC: 0.5 MG/DL (ref 0–1)
BUN SERPL-MCNC: 40 MG/DL (ref 7–20)
CALCIUM SERPL-MCNC: 10 MG/DL (ref 8.3–10.6)
CHLORIDE SERPL-SCNC: 94 MMOL/L (ref 99–110)
CO2 BLDV-SCNC: 30 MMOL/L
CO2 SERPL-SCNC: 27 MMOL/L (ref 21–32)
COHGB MFR BLDV: 2.9 % (ref 0–1.5)
CREAT SERPL-MCNC: 1.5 MG/DL (ref 0.6–1.2)
DEPRECATED RDW RBC AUTO: 15.1 % (ref 12.4–15.4)
EOSINOPHIL # BLD: 0 K/UL (ref 0–0.6)
EOSINOPHIL NFR BLD: 0 %
FLUAV RNA RESP QL NAA+PROBE: NOT DETECTED
FLUBV RNA RESP QL NAA+PROBE: NOT DETECTED
GFR SERPLBLD CREATININE-BSD FMLA CKD-EPI: 35 ML/MIN/{1.73_M2}
GLUCOSE SERPL-MCNC: 160 MG/DL (ref 70–99)
HCO3 BLDV-SCNC: 29.2 MMOL/L (ref 23–29)
HCT VFR BLD AUTO: 37.7 % (ref 36–48)
HGB BLD-MCNC: 12.1 G/DL (ref 12–16)
LACTATE BLDV-SCNC: 1.6 MMOL/L (ref 0.4–1.9)
LYMPHOCYTES # BLD: 0.5 K/UL (ref 1–5.1)
LYMPHOCYTES NFR BLD: 2.9 %
MCH RBC QN AUTO: 29.4 PG (ref 26–34)
MCHC RBC AUTO-ENTMCNC: 32.2 G/DL (ref 31–36)
MCV RBC AUTO: 91.4 FL (ref 80–100)
METHGB MFR BLDV: 0.6 %
MONOCYTES # BLD: 1 K/UL (ref 0–1.3)
MONOCYTES NFR BLD: 5.6 %
NEUTROPHILS # BLD: 17.2 K/UL (ref 1.7–7.7)
NEUTROPHILS NFR BLD: 91.3 %
NT-PROBNP SERPL-MCNC: ABNORMAL PG/ML (ref 0–449)
O2 THERAPY: ABNORMAL
PCO2 BLDV: 33.6 MMHG (ref 40–50)
PH BLDV: 7.56 [PH] (ref 7.35–7.45)
PLATELET # BLD AUTO: 244 K/UL (ref 135–450)
PMV BLD AUTO: 8.2 FL (ref 5–10.5)
PO2 BLDV: 114.8 MMHG (ref 25–40)
POTASSIUM SERPL-SCNC: 4.1 MMOL/L (ref 3.5–5.1)
PROT SERPL-MCNC: 7.6 G/DL (ref 6.4–8.2)
RBC # BLD AUTO: 4.12 M/UL (ref 4–5.2)
SAO2 % BLDV: 98 %
SARS-COV-2 RNA RESP QL NAA+PROBE: DETECTED
SODIUM SERPL-SCNC: 136 MMOL/L (ref 136–145)
TROPONIN, HIGH SENSITIVITY: 81 NG/L (ref 0–14)
TROPONIN, HIGH SENSITIVITY: 82 NG/L (ref 0–14)
WBC # BLD AUTO: 18.9 K/UL (ref 4–11)

## 2024-01-02 PROCEDURE — 2060000000 HC ICU INTERMEDIATE R&B

## 2024-01-02 PROCEDURE — 96374 THER/PROPH/DIAG INJ IV PUSH: CPT

## 2024-01-02 PROCEDURE — 87636 SARSCOV2 & INF A&B AMP PRB: CPT

## 2024-01-02 PROCEDURE — 80053 COMPREHEN METABOLIC PANEL: CPT

## 2024-01-02 PROCEDURE — 99285 EMERGENCY DEPT VISIT HI MDM: CPT

## 2024-01-02 PROCEDURE — 71045 X-RAY EXAM CHEST 1 VIEW: CPT

## 2024-01-02 PROCEDURE — 84145 PROCALCITONIN (PCT): CPT

## 2024-01-02 PROCEDURE — 83880 ASSAY OF NATRIURETIC PEPTIDE: CPT

## 2024-01-02 PROCEDURE — 87040 BLOOD CULTURE FOR BACTERIA: CPT

## 2024-01-02 PROCEDURE — 83605 ASSAY OF LACTIC ACID: CPT

## 2024-01-02 PROCEDURE — 84484 ASSAY OF TROPONIN QUANT: CPT

## 2024-01-02 PROCEDURE — 93005 ELECTROCARDIOGRAM TRACING: CPT | Performed by: PHYSICIAN ASSISTANT

## 2024-01-02 PROCEDURE — 6360000002 HC RX W HCPCS: Performed by: PHYSICIAN ASSISTANT

## 2024-01-02 PROCEDURE — 82803 BLOOD GASES ANY COMBINATION: CPT

## 2024-01-02 PROCEDURE — 85025 COMPLETE CBC W/AUTO DIFF WBC: CPT

## 2024-01-02 PROCEDURE — 2580000003 HC RX 258: Performed by: PHYSICIAN ASSISTANT

## 2024-01-02 PROCEDURE — 6360000002 HC RX W HCPCS: Performed by: STUDENT IN AN ORGANIZED HEALTH CARE EDUCATION/TRAINING PROGRAM

## 2024-01-02 RX ORDER — FUROSEMIDE 10 MG/ML
40 INJECTION INTRAMUSCULAR; INTRAVENOUS ONCE
Status: COMPLETED | OUTPATIENT
Start: 2024-01-02 | End: 2024-01-02

## 2024-01-02 RX ORDER — INSULIN LISPRO 100 [IU]/ML
0-8 INJECTION, SOLUTION INTRAVENOUS; SUBCUTANEOUS
Status: DISCONTINUED | OUTPATIENT
Start: 2024-01-03 | End: 2024-01-09 | Stop reason: HOSPADM

## 2024-01-02 RX ORDER — GLUCAGON 1 MG/ML
1 KIT INJECTION PRN
Status: DISCONTINUED | OUTPATIENT
Start: 2024-01-02 | End: 2024-01-09 | Stop reason: HOSPADM

## 2024-01-02 RX ORDER — 0.9 % SODIUM CHLORIDE 0.9 %
500 INTRAVENOUS SOLUTION INTRAVENOUS ONCE
Status: COMPLETED | OUTPATIENT
Start: 2024-01-02 | End: 2024-01-03

## 2024-01-02 RX ORDER — INSULIN LISPRO 100 [IU]/ML
0-4 INJECTION, SOLUTION INTRAVENOUS; SUBCUTANEOUS NIGHTLY
Status: DISCONTINUED | OUTPATIENT
Start: 2024-01-03 | End: 2024-01-09 | Stop reason: HOSPADM

## 2024-01-02 RX ORDER — METHYLPREDNISOLONE SODIUM SUCCINATE 125 MG/2ML
60 INJECTION, POWDER, LYOPHILIZED, FOR SOLUTION INTRAMUSCULAR; INTRAVENOUS ONCE
Status: COMPLETED | OUTPATIENT
Start: 2024-01-02 | End: 2024-01-02

## 2024-01-02 RX ORDER — ENOXAPARIN SODIUM 100 MG/ML
30 INJECTION SUBCUTANEOUS DAILY
Status: CANCELLED | OUTPATIENT
Start: 2024-01-03

## 2024-01-02 RX ORDER — DEXTROSE MONOHYDRATE 100 MG/ML
INJECTION, SOLUTION INTRAVENOUS CONTINUOUS PRN
Status: DISCONTINUED | OUTPATIENT
Start: 2024-01-02 | End: 2024-01-09 | Stop reason: HOSPADM

## 2024-01-02 RX ADMIN — SODIUM CHLORIDE 500 ML: 9 INJECTION, SOLUTION INTRAVENOUS at 23:17

## 2024-01-02 RX ADMIN — FUROSEMIDE 40 MG: 10 INJECTION, SOLUTION INTRAMUSCULAR; INTRAVENOUS at 23:52

## 2024-01-02 RX ADMIN — METHYLPREDNISOLONE SODIUM SUCCINATE 60 MG: 125 INJECTION INTRAMUSCULAR; INTRAVENOUS at 23:19

## 2024-01-02 ASSESSMENT — PAIN - FUNCTIONAL ASSESSMENT: PAIN_FUNCTIONAL_ASSESSMENT: 0-10

## 2024-01-02 ASSESSMENT — PAIN SCALES - GENERAL: PAINLEVEL_OUTOF10: 2

## 2024-01-03 PROBLEM — R06.02 SOB (SHORTNESS OF BREATH): Status: ACTIVE | Noted: 2024-01-03

## 2024-01-03 PROBLEM — I27.20 PULMONARY HTN (HCC): Status: ACTIVE | Noted: 2024-01-03

## 2024-01-03 PROBLEM — R09.89 PULMONARY VENOUS CONGESTION: Status: ACTIVE | Noted: 2024-01-03

## 2024-01-03 PROBLEM — J43.2 CENTRILOBULAR EMPHYSEMA (HCC): Status: ACTIVE | Noted: 2024-01-03

## 2024-01-03 LAB
BASOPHILS # BLD: 0 K/UL (ref 0–0.2)
BASOPHILS NFR BLD: 0.1 %
DEPRECATED RDW RBC AUTO: 15.2 % (ref 12.4–15.4)
EKG ATRIAL RATE: 96 BPM
EKG DIAGNOSIS: NORMAL
EKG P AXIS: 86 DEGREES
EKG P-R INTERVAL: 142 MS
EKG Q-T INTERVAL: 396 MS
EKG QRS DURATION: 80 MS
EKG QTC CALCULATION (BAZETT): 500 MS
EKG R AXIS: -73 DEGREES
EKG T AXIS: 91 DEGREES
EKG VENTRICULAR RATE: 96 BPM
EOSINOPHIL # BLD: 0 K/UL (ref 0–0.6)
EOSINOPHIL NFR BLD: 0 %
GLUCOSE BLD-MCNC: 147 MG/DL (ref 70–99)
GLUCOSE BLD-MCNC: 176 MG/DL (ref 70–99)
GLUCOSE BLD-MCNC: 182 MG/DL (ref 70–99)
GLUCOSE BLD-MCNC: 191 MG/DL (ref 70–99)
HCT VFR BLD AUTO: 36.4 % (ref 36–48)
HGB BLD-MCNC: 11.8 G/DL (ref 12–16)
LEGIONELLA AG UR QL: NORMAL
LYMPHOCYTES # BLD: 0.4 K/UL (ref 1–5.1)
LYMPHOCYTES NFR BLD: 2.5 %
MCH RBC QN AUTO: 29.9 PG (ref 26–34)
MCHC RBC AUTO-ENTMCNC: 32.5 G/DL (ref 31–36)
MCV RBC AUTO: 92.1 FL (ref 80–100)
MONOCYTES # BLD: 0.2 K/UL (ref 0–1.3)
MONOCYTES NFR BLD: 1.5 %
NEUTROPHILS # BLD: 13.5 K/UL (ref 1.7–7.7)
NEUTROPHILS NFR BLD: 95.9 %
PERFORMED ON: ABNORMAL
PLATELET # BLD AUTO: 203 K/UL (ref 135–450)
PMV BLD AUTO: 8.6 FL (ref 5–10.5)
PROCALCITONIN SERPL IA-MCNC: 1.47 NG/ML (ref 0–0.15)
RBC # BLD AUTO: 3.95 M/UL (ref 4–5.2)
S PNEUM AG UR QL: NORMAL
WBC # BLD AUTO: 14.1 K/UL (ref 4–11)

## 2024-01-03 PROCEDURE — 85025 COMPLETE CBC W/AUTO DIFF WBC: CPT

## 2024-01-03 PROCEDURE — 6370000000 HC RX 637 (ALT 250 FOR IP): Performed by: INTERNAL MEDICINE

## 2024-01-03 PROCEDURE — 2700000000 HC OXYGEN THERAPY PER DAY

## 2024-01-03 PROCEDURE — 94669 MECHANICAL CHEST WALL OSCILL: CPT

## 2024-01-03 PROCEDURE — 1200000000 HC SEMI PRIVATE

## 2024-01-03 PROCEDURE — 97530 THERAPEUTIC ACTIVITIES: CPT

## 2024-01-03 PROCEDURE — 97162 PT EVAL MOD COMPLEX 30 MIN: CPT

## 2024-01-03 PROCEDURE — 36415 COLL VENOUS BLD VENIPUNCTURE: CPT

## 2024-01-03 PROCEDURE — 87449 NOS EACH ORGANISM AG IA: CPT

## 2024-01-03 PROCEDURE — 99223 1ST HOSP IP/OBS HIGH 75: CPT | Performed by: INTERNAL MEDICINE

## 2024-01-03 PROCEDURE — 6360000002 HC RX W HCPCS: Performed by: INTERNAL MEDICINE

## 2024-01-03 PROCEDURE — 97166 OT EVAL MOD COMPLEX 45 MIN: CPT

## 2024-01-03 PROCEDURE — 97535 SELF CARE MNGMENT TRAINING: CPT

## 2024-01-03 PROCEDURE — 94761 N-INVAS EAR/PLS OXIMETRY MLT: CPT

## 2024-01-03 PROCEDURE — 97110 THERAPEUTIC EXERCISES: CPT

## 2024-01-03 PROCEDURE — 94640 AIRWAY INHALATION TREATMENT: CPT

## 2024-01-03 PROCEDURE — 93010 ELECTROCARDIOGRAM REPORT: CPT | Performed by: INTERNAL MEDICINE

## 2024-01-03 PROCEDURE — 2580000003 HC RX 258: Performed by: INTERNAL MEDICINE

## 2024-01-03 RX ORDER — ONDANSETRON 4 MG/1
4 TABLET, ORALLY DISINTEGRATING ORAL EVERY 8 HOURS PRN
Status: DISCONTINUED | OUTPATIENT
Start: 2024-01-03 | End: 2024-01-09 | Stop reason: HOSPADM

## 2024-01-03 RX ORDER — ASPIRIN 81 MG/1
81 TABLET, CHEWABLE ORAL DAILY
Status: DISCONTINUED | OUTPATIENT
Start: 2024-01-03 | End: 2024-01-09 | Stop reason: HOSPADM

## 2024-01-03 RX ORDER — METHYLPREDNISOLONE SODIUM SUCCINATE 40 MG/ML
20 INJECTION, POWDER, LYOPHILIZED, FOR SOLUTION INTRAMUSCULAR; INTRAVENOUS EVERY 8 HOURS
Status: DISCONTINUED | OUTPATIENT
Start: 2024-01-03 | End: 2024-01-03

## 2024-01-03 RX ORDER — ONDANSETRON 2 MG/ML
4 INJECTION INTRAMUSCULAR; INTRAVENOUS EVERY 6 HOURS PRN
Status: DISCONTINUED | OUTPATIENT
Start: 2024-01-03 | End: 2024-01-09 | Stop reason: HOSPADM

## 2024-01-03 RX ORDER — ACETAMINOPHEN 325 MG/1
650 TABLET ORAL EVERY 6 HOURS PRN
Status: DISCONTINUED | OUTPATIENT
Start: 2024-01-03 | End: 2024-01-09 | Stop reason: HOSPADM

## 2024-01-03 RX ORDER — APIXABAN 2.5 MG/1
2.5 TABLET, FILM COATED ORAL 2 TIMES DAILY
COMMUNITY
Start: 2023-10-02

## 2024-01-03 RX ORDER — PANTOPRAZOLE SODIUM 40 MG/1
40 TABLET, DELAYED RELEASE ORAL DAILY
Status: DISCONTINUED | OUTPATIENT
Start: 2024-01-03 | End: 2024-01-09 | Stop reason: HOSPADM

## 2024-01-03 RX ORDER — ALBUTEROL SULFATE 90 UG/1
2 AEROSOL, METERED RESPIRATORY (INHALATION) EVERY 6 HOURS PRN
Status: DISCONTINUED | OUTPATIENT
Start: 2024-01-03 | End: 2024-01-09 | Stop reason: HOSPADM

## 2024-01-03 RX ORDER — SODIUM CHLORIDE 9 MG/ML
INJECTION, SOLUTION INTRAVENOUS PRN
Status: DISCONTINUED | OUTPATIENT
Start: 2024-01-03 | End: 2024-01-09 | Stop reason: HOSPADM

## 2024-01-03 RX ORDER — ALBUTEROL SULFATE 2.5 MG/3ML
2.5 SOLUTION RESPIRATORY (INHALATION) EVERY 4 HOURS PRN
Status: DISCONTINUED | OUTPATIENT
Start: 2024-01-03 | End: 2024-01-03

## 2024-01-03 RX ORDER — SODIUM CHLORIDE 0.9 % (FLUSH) 0.9 %
10 SYRINGE (ML) INJECTION PRN
Status: DISCONTINUED | OUTPATIENT
Start: 2024-01-03 | End: 2024-01-09 | Stop reason: HOSPADM

## 2024-01-03 RX ORDER — BUSPIRONE HYDROCHLORIDE 5 MG/1
5 TABLET ORAL EVERY 8 HOURS PRN
Status: DISCONTINUED | OUTPATIENT
Start: 2024-01-03 | End: 2024-01-09 | Stop reason: HOSPADM

## 2024-01-03 RX ORDER — GUAIFENESIN 600 MG/1
600 TABLET, EXTENDED RELEASE ORAL 2 TIMES DAILY
Status: DISCONTINUED | OUTPATIENT
Start: 2024-01-03 | End: 2024-01-09 | Stop reason: HOSPADM

## 2024-01-03 RX ORDER — ATORVASTATIN CALCIUM 40 MG/1
40 TABLET, FILM COATED ORAL NIGHTLY
Status: DISCONTINUED | OUTPATIENT
Start: 2024-01-03 | End: 2024-01-09 | Stop reason: HOSPADM

## 2024-01-03 RX ORDER — SENNOSIDES A AND B 8.6 MG/1
1 TABLET, FILM COATED ORAL DAILY PRN
Status: DISCONTINUED | OUTPATIENT
Start: 2024-01-03 | End: 2024-01-09 | Stop reason: HOSPADM

## 2024-01-03 RX ORDER — DEXAMETHASONE 4 MG/1
6 TABLET ORAL DAILY
Status: DISCONTINUED | OUTPATIENT
Start: 2024-01-04 | End: 2024-01-09 | Stop reason: HOSPADM

## 2024-01-03 RX ORDER — ACETAMINOPHEN 650 MG/1
650 SUPPOSITORY RECTAL EVERY 6 HOURS PRN
Status: DISCONTINUED | OUTPATIENT
Start: 2024-01-03 | End: 2024-01-09 | Stop reason: HOSPADM

## 2024-01-03 RX ORDER — SODIUM CHLORIDE 0.9 % (FLUSH) 0.9 %
10 SYRINGE (ML) INJECTION EVERY 12 HOURS SCHEDULED
Status: DISCONTINUED | OUTPATIENT
Start: 2024-01-03 | End: 2024-01-09 | Stop reason: HOSPADM

## 2024-01-03 RX ORDER — TORSEMIDE 20 MG/1
20 TABLET ORAL DAILY
Status: DISCONTINUED | OUTPATIENT
Start: 2024-01-03 | End: 2024-01-04

## 2024-01-03 RX ORDER — MECOBALAMIN 5000 MCG
5 TABLET,DISINTEGRATING ORAL NIGHTLY
Status: DISCONTINUED | OUTPATIENT
Start: 2024-01-03 | End: 2024-01-09 | Stop reason: HOSPADM

## 2024-01-03 RX ADMIN — SERTRALINE HYDROCHLORIDE 50 MG: 50 TABLET ORAL at 09:20

## 2024-01-03 RX ADMIN — BUSPIRONE HYDROCHLORIDE 5 MG: 5 TABLET ORAL at 20:29

## 2024-01-03 RX ADMIN — ONDANSETRON 4 MG: 2 INJECTION INTRAMUSCULAR; INTRAVENOUS at 06:59

## 2024-01-03 RX ADMIN — SODIUM CHLORIDE, PRESERVATIVE FREE 10 ML: 5 INJECTION INTRAVENOUS at 09:20

## 2024-01-03 RX ADMIN — GUAIFENESIN 600 MG: 600 TABLET ORAL at 09:20

## 2024-01-03 RX ADMIN — ASPIRIN 81 MG: 81 TABLET, CHEWABLE ORAL at 09:20

## 2024-01-03 RX ADMIN — AZITHROMYCIN MONOHYDRATE 500 MG: 500 INJECTION, POWDER, LYOPHILIZED, FOR SOLUTION INTRAVENOUS at 01:24

## 2024-01-03 RX ADMIN — SODIUM CHLORIDE, PRESERVATIVE FREE 10 ML: 5 INJECTION INTRAVENOUS at 20:22

## 2024-01-03 RX ADMIN — GUAIFENESIN 600 MG: 600 TABLET ORAL at 20:21

## 2024-01-03 RX ADMIN — APIXABAN 2.5 MG: 5 TABLET, FILM COATED ORAL at 09:20

## 2024-01-03 RX ADMIN — Medication 2 PUFF: at 09:08

## 2024-01-03 RX ADMIN — METHYLPREDNISOLONE SODIUM SUCCINATE 20 MG: 40 INJECTION INTRAMUSCULAR; INTRAVENOUS at 15:22

## 2024-01-03 RX ADMIN — Medication 2 PUFF: at 19:36

## 2024-01-03 RX ADMIN — TIOTROPIUM BROMIDE INHALATION SPRAY 2 PUFF: 3.12 SPRAY, METERED RESPIRATORY (INHALATION) at 09:08

## 2024-01-03 RX ADMIN — ATORVASTATIN CALCIUM 40 MG: 40 TABLET, FILM COATED ORAL at 20:21

## 2024-01-03 RX ADMIN — METHYLPREDNISOLONE SODIUM SUCCINATE 20 MG: 40 INJECTION INTRAMUSCULAR; INTRAVENOUS at 06:13

## 2024-01-03 RX ADMIN — PANTOPRAZOLE SODIUM 40 MG: 40 TABLET, DELAYED RELEASE ORAL at 09:22

## 2024-01-03 RX ADMIN — Medication 5 MG: at 20:21

## 2024-01-03 RX ADMIN — APIXABAN 2.5 MG: 5 TABLET, FILM COATED ORAL at 20:21

## 2024-01-03 ASSESSMENT — ENCOUNTER SYMPTOMS
COUGH: 1
SHORTNESS OF BREATH: 1
DIARRHEA: 1

## 2024-01-03 NOTE — ED PROVIDER NOTES
[facility]  ED  EMERGENCY DEPARTMENT ENCOUNTER        Pt Name: Terri Smith  MRN: [id number]  Birthdate 1940  Date of evaluation: 1/2/2024  Provider: NURA JIMENEZ PA-C  PCP: Jackson Ontiveros MD  ED Attending: MD Venancio       I have seen and evaluated this patient with my supervising physician Galileo Craft MD.    CHIEF COMPLAINT:     Chief Complaint   Patient presents with    Abdominal Pain     Loose stools, feeling SOB.  Just Dx with pneumonia at [facility]        HISTORY OF PRESENT ILLNESS:      History provided by the patient. No limitations.    Terri Smith is a 83 y.o. female who arrives to the ED by EMS from home.  The patient reports she has been feeling ill with cough, congestion, shortness of breath as well as diarrhea.  She had a hospitalization at [facility] in mid December and among other things was found to have acute on chronic respiratory failure in addition to possible pneumonia (per patient).  Patient felt like she was getting over that but began feeling more short of breath in the last couple of days.  She wears 2 L of nasal cannula oxygen at baseline and has been doing fairly well with this, at rest.  Activity causes symptoms to be worse.  She denies chest pain.  She denies abdominal pain, nausea or vomiting.  No identifiable exacerbating or alleviating factors to symptoms.    Nursing Notes were reviewed     REVIEW OF SYSTEMS:     Review of Systems  Positives and pertinent negatives as per HPI.      PAST MEDICAL HISTORY:     Past Medical History:   Diagnosis Date    NADJA (acute kidney injury) ([facility])     Asthma     Atrial fibrillation with RVR ([facility])     CAD (coronary artery disease)     CHF (congestive heart failure) ([facility])     unsure    Chronic anxiety     COPD (chronic obstructive pulmonary disease) ([facility])     COPD (chronic obstructive pulmonary disease) ([facility]) 08/19/2023    GERD (gastroesophageal reflux disease) 09/09/2021    Heart attack ([facility]) 2019

## 2024-01-03 NOTE — ED PROVIDER NOTES
Attending Supervisory Note/Shared Visit       I personally saw the patient and made/approved the management plan and take responsibility for the patient management.      History: 84 yo F p/w gen weakness, SOB, diarrhea, difficulty with ADLs at home.  Found to have COVID by the [facility].    Exam: Mildly tachypneic, recruitment of supraclavicular accessory muscles, alert, oriented, crackles bilaterally on auscultation.    MDM: 84 yo F p/w gen weakness, SOB, diarrhea, difficulty with ADLs at home.  Found to have COVID by the [facility].  Tachypneic on my assessment, needs observation for volume status management, potential repeat echo given markedly elevated BNP, COVID dx, interstitial infiltrates, worsening renal function.  Pt amenable to this plan.  Stable on her home 2.5L NC. RR increased after IVF admin by Ms. Kumar to tx NADJA which was reasonable, given this and elevated BNP, her respiratory alkalosis and crackles on exam, favor to be clinically with pulm edema, will diurese with IV Lasix.  Admitted.      Ms. Kumar d/w hospitalist, Dr. Fields for admission.  Pt admitted, went up in stable condition.    Medications   sodium chloride 0.9 % bolus 500 mL (500 mLs IntraVENous New Bag 1/2/24 2317)   methylPREDNISolone sodium succ (SOLU-MEDROL) injection 60 mg (60 mg IntraVENous Given 1/2/24 2319)       XR CHEST PORTABLE   Final Result   Resolving pulmonary interstitial edema.      Chronic obstructive lung changes with probable underlying chronic   interstitial lung disease which is less prominent with no obvious infiltrate   or effusion..             Labs Reviewed   COVID-19 & INFLUENZA COMBO - Abnormal; Notable for the following components:       Result Value    SARS-CoV-2 RNA, RT PCR DETECTED (*)     All other components within normal limits   CBC WITH AUTO DIFFERENTIAL - Abnormal; Notable for the following components:    WBC 18.9 (*)     Neutrophils Absolute 17.2 (*)     Lymphocytes Absolute 0.5 (*)     All other components

## 2024-01-03 NOTE — ACP (ADVANCE CARE PLANNING)
is otherwise healthy. Unfortunately, CPR does not typically restart the heart for people who have serious health conditions or who are very sick.    In the event your heart stopped, would you want attempts to restart your heart (answer \"yes\") or would you prefer a natural death (answer \"no\")? yes    If your health were to worsen and it became clear that your chance of recovery was unlikely, would that change your answer? Yes    [x] Yes  [] No   Educated Patient / Decision Maker regarding differences between Advance Directives and portable DNR orders.    Length of ACP Conversation in minutes:  20 minutes    Conversation Outcomes:  [x] ACP discussion completed  [] Existing advance directive reviewed with patient; no changes to patient's previously recorded wishes   [] New Advance Directive completed   [] Portable Do Not Rescitate prepared for Provider review and signature  [] POLST/POST/MOLST/MOST prepared for Provider review and signature      Follow-up plan:    [] Schedule follow-up conversation to continue planning  [] Referred individual to Provider for additional questions/concerns   [] Advised patient/agent/surrogate to review completed ACP document and update if needed with changes in condition, patient preferences or care setting     [] This note routed to one or more involved healthcare providers       Mary Myers, RN  ACP Clinical Specialist

## 2024-01-03 NOTE — PLAN OF CARE
Problem: Discharge Planning  Goal: Discharge to home or other facility with appropriate resources  Outcome: Progressing  Flowsheets (Taken 1/3/2024 0911)  Discharge to home or other facility with appropriate resources: Identify barriers to discharge with patient and caregiver     Problem: Safety - Adult  Goal: Free from fall injury  Outcome: Progressing  Flowsheets (Taken 1/3/2024 0910)  Free From Fall Injury: Instruct family/caregiver on patient safety     Problem: Chronic Conditions and Co-morbidities  Goal: Patient's chronic conditions and co-morbidity symptoms are monitored and maintained or improved  Outcome: Progressing  Flowsheets (Taken 1/3/2024 0911)  Care Plan - Patient's Chronic Conditions and Co-Morbidity Symptoms are Monitored and Maintained or Improved: Monitor and assess patient's chronic conditions and comorbid symptoms for stability, deterioration, or improvement     Problem: Neurosensory - Adult  Goal: Achieves stable or improved neurological status  Outcome: Progressing  Flowsheets (Taken 1/3/2024 0911)  Achieves stable or improved neurological status: Assess for and report changes in neurological status     Problem: Respiratory - Adult  Goal: Achieves optimal ventilation and oxygenation  Outcome: Progressing  Flowsheets (Taken 1/3/2024 0911)  Achieves optimal ventilation and oxygenation:   Assess for changes in respiratory status   Assess for changes in mentation and behavior   Position to facilitate oxygenation and minimize respiratory effort   Oxygen supplementation based on oxygen saturation or arterial blood gases     Problem: Cardiovascular - Adult  Goal: Maintains optimal cardiac output and hemodynamic stability  Outcome: Progressing  Flowsheets (Taken 1/3/2024 0911)  Maintains optimal cardiac output and hemodynamic stability: Monitor blood pressure and heart rate     Problem: Skin/Tissue Integrity - Adult  Goal: Skin integrity remains intact  Outcome: Progressing  Flowsheets  Taken

## 2024-01-03 NOTE — CARE COORDINATION
Case Management Assessment  Initial Evaluation    Date/Time of Evaluation: 1/3/2024 4:43 PM  Assessment Completed by: Mary Myers RN    If patient is discharged prior to next notation, then this note serves as note for discharge by case management.    Patient Name: Terri Smith                   YOB: 1940  Diagnosis: Shortness of breath [R06.02]  Generalized weakness [R53.1]  Chronic hypoxemic respiratory failure (HCC) [J96.11]  NADJA (acute kidney injury) (HCC) [N17.9]  Diarrhea, unspecified type [R19.7]  COVID-19 virus infection [U07.1]                   Date / Time: 1/2/2024  9:47 PM    Patient Admission Status: Inpatient   Readmission Risk (Low < 19, Mod (19-27), High > 27): Readmission Risk Score: 33    Current PCP: Jackson Ontiveros MD  PCP verified by CM? Yes    Chart Reviewed: Yes      History Provided by:    Patient Orientation: Alert and Oriented (patient Covid PNA - information obtained from katie Lim)    Patient Cognition: Alert    Hospitalization in the last 30 days (Readmission):  Yes    If yes, Readmission Assessment in CM Navigator will be completed.    Advance Directives:      Code Status: Full Code   Patient's Primary Decision Maker is: Named in Scanned ACP Document (family to provide copy)    Primary Decision Maker: Juan Smith - Child - [phone]    Secondary Decision Maker: Hafsa Tapia - Niece/Nephew - [phone]    Discharge Planning:    Patient lives with: Children (lives at home with katie Lim. Some family member is with her 24/7) Type of Home: House  Primary Care Giver: Self  Patient Support Systems include: Children, Family Members   Current Financial resources: Medicare  Current community resources: Other (Comment) (home O2 - unsure of provider - baseline 2.5L)  Current services prior to admission: Oxygen Therapy            Current DME: [facility], [facility]            Type of Home Care services:  None    ADLS  Prior functional level: Independent in

## 2024-01-03 NOTE — H&P
distress.     Appearance: She is well-developed. She is ill-appearing. She is not diaphoretic.   HENT:      Head: Normocephalic and atraumatic.      Mouth/Throat:      Mouth: Mucous membranes are moist.   Eyes:      Extraocular Movements: Extraocular movements intact.      Pupils: Pupils are equal, round, and reactive to light.   Cardiovascular:      Rate and Rhythm: Normal rate and regular rhythm.      Pulses: Normal pulses.      Heart sounds: Normal heart sounds. No murmur heard.     No friction rub. No gallop.   Pulmonary:      Effort: No respiratory distress.      Breath sounds: Wheezing present. No rales.      Comments: Diminished breath sounds over right lung with mild expiratory wheezing.   Mild crackles / congestion over left lung base.   Chest:      Chest wall: No tenderness.   Abdominal:      General: Bowel sounds are normal. There is no distension.      Palpations: Abdomen is soft. There is no mass.      Tenderness: There is no abdominal tenderness. There is no guarding.   Musculoskeletal:         General: No tenderness or deformity.      Cervical back: Normal range of motion and neck supple.      Right lower leg: No edema.      Left lower leg: No edema.   Skin:     General: Skin is warm and dry.      Capillary Refill: Capillary refill takes less than 2 seconds.   Neurological:      General: No focal deficit present.      Mental Status: She is alert and oriented to person, place, and time.   Psychiatric:         Behavior: Behavior normal.         LABS / IMAGING:     Recent Labs     01/02/24  2211   WBC 18.9*   HGB 12.1   HCT 37.7        Recent Labs     01/02/24  2211      K 4.1   CL 94*   CO2 27   BUN 40*   CREATININE 1.5*   CALCIUM 10.0     Recent Labs     01/02/24  2211   AST 11*   ALT <5*   BILITOT 0.5   ALKPHOS 94     No results for input(s): \"INR\" in the last 72 hours.  No results for input(s): \"CKTOTAL\", \"TROPONINI\" in the last 72 hours.    XR CHEST PORTABLE   Final Result   Resolving

## 2024-01-04 LAB
ALBUMIN SERPL-MCNC: 3.1 G/DL (ref 3.4–5)
ALBUMIN/GLOB SERPL: 0.9 {RATIO} (ref 1.1–2.2)
ALP SERPL-CCNC: 78 U/L (ref 40–129)
ALT SERPL-CCNC: 7 U/L (ref 10–40)
ANION GAP SERPL CALCULATED.3IONS-SCNC: 12 MMOL/L (ref 3–16)
AST SERPL-CCNC: 14 U/L (ref 15–37)
BASOPHILS # BLD: 0 K/UL (ref 0–0.2)
BASOPHILS NFR BLD: 0.1 %
BILIRUB SERPL-MCNC: <0.2 MG/DL (ref 0–1)
BUN SERPL-MCNC: 62 MG/DL (ref 7–20)
CALCIUM SERPL-MCNC: 9.6 MG/DL (ref 8.3–10.6)
CHLORIDE SERPL-SCNC: 98 MMOL/L (ref 99–110)
CHLORIDE UR-SCNC: 35 MMOL/L
CO2 SERPL-SCNC: 30 MMOL/L (ref 21–32)
CREAT SERPL-MCNC: 1.7 MG/DL (ref 0.6–1.2)
DEPRECATED RDW RBC AUTO: 14.9 % (ref 12.4–15.4)
EOSINOPHIL # BLD: 0 K/UL (ref 0–0.6)
EOSINOPHIL NFR BLD: 0 %
GFR SERPLBLD CREATININE-BSD FMLA CKD-EPI: 30 ML/MIN/{1.73_M2}
GLUCOSE BLD-MCNC: 140 MG/DL (ref 70–99)
GLUCOSE BLD-MCNC: 156 MG/DL (ref 70–99)
GLUCOSE BLD-MCNC: 173 MG/DL (ref 70–99)
GLUCOSE SERPL-MCNC: 121 MG/DL (ref 70–99)
HCT VFR BLD AUTO: 31.7 % (ref 36–48)
HGB BLD-MCNC: 10.5 G/DL (ref 12–16)
LYMPHOCYTES # BLD: 0.5 K/UL (ref 1–5.1)
LYMPHOCYTES NFR BLD: 4.6 %
MCH RBC QN AUTO: 30.1 PG (ref 26–34)
MCHC RBC AUTO-ENTMCNC: 33 G/DL (ref 31–36)
MCV RBC AUTO: 91.2 FL (ref 80–100)
MONOCYTES # BLD: 0.5 K/UL (ref 0–1.3)
MONOCYTES NFR BLD: 4.4 %
NEUTROPHILS # BLD: 10.8 K/UL (ref 1.7–7.7)
NEUTROPHILS NFR BLD: 90.9 %
PERFORMED ON: ABNORMAL
PLATELET # BLD AUTO: 188 K/UL (ref 135–450)
PMV BLD AUTO: 8.5 FL (ref 5–10.5)
POTASSIUM SERPL-SCNC: 3.7 MMOL/L (ref 3.5–5.1)
POTASSIUM UR-SCNC: 24.2 MMOL/L
PROT SERPL-MCNC: 6.4 G/DL (ref 6.4–8.2)
RBC # BLD AUTO: 3.48 M/UL (ref 4–5.2)
SODIUM SERPL-SCNC: 140 MMOL/L (ref 136–145)
SODIUM UR-SCNC: 56 MMOL/L
WBC # BLD AUTO: 11.9 K/UL (ref 4–11)

## 2024-01-04 PROCEDURE — 85025 COMPLETE CBC W/AUTO DIFF WBC: CPT

## 2024-01-04 PROCEDURE — 94761 N-INVAS EAR/PLS OXIMETRY MLT: CPT

## 2024-01-04 PROCEDURE — 82436 ASSAY OF URINE CHLORIDE: CPT

## 2024-01-04 PROCEDURE — 2580000003 HC RX 258: Performed by: INTERNAL MEDICINE

## 2024-01-04 PROCEDURE — 6370000000 HC RX 637 (ALT 250 FOR IP): Performed by: INTERNAL MEDICINE

## 2024-01-04 PROCEDURE — 80053 COMPREHEN METABOLIC PANEL: CPT

## 2024-01-04 PROCEDURE — 84300 ASSAY OF URINE SODIUM: CPT

## 2024-01-04 PROCEDURE — 6360000002 HC RX W HCPCS: Performed by: STUDENT IN AN ORGANIZED HEALTH CARE EDUCATION/TRAINING PROGRAM

## 2024-01-04 PROCEDURE — 84133 ASSAY OF URINE POTASSIUM: CPT

## 2024-01-04 PROCEDURE — 1200000000 HC SEMI PRIVATE

## 2024-01-04 PROCEDURE — 99231 SBSQ HOSP IP/OBS SF/LOW 25: CPT | Performed by: INTERNAL MEDICINE

## 2024-01-04 PROCEDURE — 99232 SBSQ HOSP IP/OBS MODERATE 35: CPT | Performed by: INTERNAL MEDICINE

## 2024-01-04 PROCEDURE — 94669 MECHANICAL CHEST WALL OSCILL: CPT

## 2024-01-04 PROCEDURE — 6360000002 HC RX W HCPCS: Performed by: INTERNAL MEDICINE

## 2024-01-04 PROCEDURE — 6370000000 HC RX 637 (ALT 250 FOR IP): Performed by: STUDENT IN AN ORGANIZED HEALTH CARE EDUCATION/TRAINING PROGRAM

## 2024-01-04 PROCEDURE — 36415 COLL VENOUS BLD VENIPUNCTURE: CPT

## 2024-01-04 PROCEDURE — 94640 AIRWAY INHALATION TREATMENT: CPT

## 2024-01-04 PROCEDURE — 51798 US URINE CAPACITY MEASURE: CPT

## 2024-01-04 PROCEDURE — 2580000003 HC RX 258: Performed by: STUDENT IN AN ORGANIZED HEALTH CARE EDUCATION/TRAINING PROGRAM

## 2024-01-04 PROCEDURE — 2700000000 HC OXYGEN THERAPY PER DAY

## 2024-01-04 RX ORDER — MECLIZINE HCL 12.5 MG/1
12.5 TABLET ORAL 3 TIMES DAILY PRN
Status: DISCONTINUED | OUTPATIENT
Start: 2024-01-04 | End: 2024-01-09 | Stop reason: HOSPADM

## 2024-01-04 RX ORDER — 0.9 % SODIUM CHLORIDE 0.9 %
500 INTRAVENOUS SOLUTION INTRAVENOUS ONCE
Status: COMPLETED | OUTPATIENT
Start: 2024-01-04 | End: 2024-01-04

## 2024-01-04 RX ADMIN — Medication 2 PUFF: at 19:28

## 2024-01-04 RX ADMIN — AZITHROMYCIN MONOHYDRATE 500 MG: 500 INJECTION, POWDER, LYOPHILIZED, FOR SOLUTION INTRAVENOUS at 01:48

## 2024-01-04 RX ADMIN — Medication 2 PUFF: at 07:29

## 2024-01-04 RX ADMIN — ASPIRIN 81 MG: 81 TABLET, CHEWABLE ORAL at 09:02

## 2024-01-04 RX ADMIN — MECLIZINE 12.5 MG: 12.5 TABLET ORAL at 14:19

## 2024-01-04 RX ADMIN — GUAIFENESIN 600 MG: 600 TABLET ORAL at 21:41

## 2024-01-04 RX ADMIN — ACETAMINOPHEN 650 MG: 325 TABLET ORAL at 04:07

## 2024-01-04 RX ADMIN — BUSPIRONE HYDROCHLORIDE 5 MG: 5 TABLET ORAL at 09:07

## 2024-01-04 RX ADMIN — TIOTROPIUM BROMIDE INHALATION SPRAY 2 PUFF: 3.12 SPRAY, METERED RESPIRATORY (INHALATION) at 07:29

## 2024-01-04 RX ADMIN — ATORVASTATIN CALCIUM 40 MG: 40 TABLET, FILM COATED ORAL at 21:41

## 2024-01-04 RX ADMIN — BUSPIRONE HYDROCHLORIDE 5 MG: 5 TABLET ORAL at 18:22

## 2024-01-04 RX ADMIN — DEXAMETHASONE 6 MG: 4 TABLET ORAL at 09:01

## 2024-01-04 RX ADMIN — SODIUM CHLORIDE, PRESERVATIVE FREE 10 ML: 5 INJECTION INTRAVENOUS at 09:01

## 2024-01-04 RX ADMIN — GUAIFENESIN 600 MG: 600 TABLET ORAL at 09:02

## 2024-01-04 RX ADMIN — METOPROLOL TARTRATE 25 MG: 25 TABLET, FILM COATED ORAL at 21:41

## 2024-01-04 RX ADMIN — APIXABAN 2.5 MG: 5 TABLET, FILM COATED ORAL at 09:02

## 2024-01-04 RX ADMIN — SODIUM CHLORIDE 500 ML: 9 INJECTION, SOLUTION INTRAVENOUS at 10:33

## 2024-01-04 RX ADMIN — METOPROLOL TARTRATE 25 MG: 25 TABLET, FILM COATED ORAL at 09:02

## 2024-01-04 RX ADMIN — PANTOPRAZOLE SODIUM 40 MG: 40 TABLET, DELAYED RELEASE ORAL at 09:02

## 2024-01-04 RX ADMIN — SERTRALINE HYDROCHLORIDE 50 MG: 50 TABLET ORAL at 09:02

## 2024-01-04 RX ADMIN — TORSEMIDE 20 MG: 20 TABLET ORAL at 09:02

## 2024-01-04 RX ADMIN — APIXABAN 2.5 MG: 5 TABLET, FILM COATED ORAL at 21:41

## 2024-01-04 ASSESSMENT — PAIN SCALES - GENERAL
PAINLEVEL_OUTOF10: 2
PAINLEVEL_OUTOF10: 0
PAINLEVEL_OUTOF10: 1

## 2024-01-04 NOTE — CARE COORDINATION
SNF recommendations noted from therapy. [facility]. Patient lives with family and has someone with her at all times. She has home O2 in place but cannot recall provider. She wears 2.5 liters at baseline. Her daughter is Juan and listed as primary contact. She is a full code and here with covid in droplet precautions. Plan is for home when stable. Should family and patient change their mind and want SNF placement, patient could go under Medicare benefit after 3 night qualifying stay. This would not require pre-cert or create a delay in discharge. If patient and family continue to decline SNF can arrange HHC if agreeable.     Spoke with daughter and she confirmed they do not want SNF. Patient and family are agreeable to home care and have used [facility] in past. Referral to [facility].

## 2024-01-04 NOTE — PLAN OF CARE
Problem: Discharge Planning  Goal: Discharge to home or other facility with appropriate resources  Outcome: Progressing     Problem: Safety - Adult  Goal: Free from fall injury  Outcome: Progressing     Problem: Chronic Conditions and Co-morbidities  Goal: Patient's chronic conditions and co-morbidity symptoms are monitored and maintained or improved  Outcome: Progressing     Problem: Neurosensory - Adult  Goal: Achieves stable or improved neurological status  Outcome: Progressing

## 2024-01-04 NOTE — PLAN OF CARE
Problem: Discharge Planning  Goal: Discharge to home or other facility with appropriate resources  1/4/2024 1739 by Danni Mac RN  Outcome: Progressing  Flowsheets (Taken 1/4/2024 0830)  Discharge to home or other facility with appropriate resources: Identify barriers to discharge with patient and caregiver     Problem: Safety - Adult  Goal: Free from fall injury  1/4/2024 1739 by Danni Mac RN  Outcome: Progressing  Flowsheets (Taken 1/4/2024 0830)  Free From Fall Injury: Instruct family/caregiver on patient safety     Problem: Chronic Conditions and Co-morbidities  Goal: Patient's chronic conditions and co-morbidity symptoms are monitored and maintained or improved  1/4/2024 1739 by Danni Mac RN  Outcome: Progressing  Flowsheets (Taken 1/4/2024 0830)  Care Plan - Patient's Chronic Conditions and Co-Morbidity Symptoms are Monitored and Maintained or Improved: Monitor and assess patient's chronic conditions and comorbid symptoms for stability, deterioration, or improvement     Problem: Neurosensory - Adult  Goal: Achieves stable or improved neurological status  1/4/2024 1739 by Danni Mac RN  Outcome: Progressing  Flowsheets (Taken 1/4/2024 0830)  Achieves stable or improved neurological status: Assess for and report changes in neurological status     Problem: Respiratory - Adult  Goal: Achieves optimal ventilation and oxygenation  Outcome: Progressing  Flowsheets (Taken 1/4/2024 0830)  Achieves optimal ventilation and oxygenation: Assess for changes in respiratory status     Problem: Cardiovascular - Adult  Goal: Maintains optimal cardiac output and hemodynamic stability  Outcome: Progressing  Flowsheets (Taken 1/4/2024 0830)  Maintains optimal cardiac output and hemodynamic stability: Monitor blood pressure and heart rate     Problem: Skin/Tissue Integrity - Adult  Goal: Skin integrity remains intact  Outcome: Progressing  Flowsheets (Taken 1/4/2024 0830)  Skin Integrity Remains

## 2024-01-04 NOTE — CARE COORDINATION
[facility]    Referral received from  to follow for home care services.   I will follow for needs, and speak with patient to verify demos.    Fátima Mccoy RN, BSN -[phone]  [facility]   [phone] fax [phone]  [facility] -[phone]  [facility] -[phone]

## 2024-01-05 ENCOUNTER — TELEPHONE (OUTPATIENT)
Dept: PULMONOLOGY | Age: 84
End: 2024-01-05

## 2024-01-05 LAB
ALBUMIN SERPL-MCNC: 3.1 G/DL (ref 3.4–5)
ALBUMIN/GLOB SERPL: 0.9 {RATIO} (ref 1.1–2.2)
ALP SERPL-CCNC: 80 U/L (ref 40–129)
ALT SERPL-CCNC: 13 U/L (ref 10–40)
ANION GAP SERPL CALCULATED.3IONS-SCNC: 10 MMOL/L (ref 3–16)
AST SERPL-CCNC: 22 U/L (ref 15–37)
BASOPHILS # BLD: 0 K/UL (ref 0–0.2)
BASOPHILS NFR BLD: 0 %
BILIRUB SERPL-MCNC: <0.2 MG/DL (ref 0–1)
BUN SERPL-MCNC: 60 MG/DL (ref 7–20)
CALCIUM SERPL-MCNC: 9.2 MG/DL (ref 8.3–10.6)
CHLORIDE SERPL-SCNC: 98 MMOL/L (ref 99–110)
CO2 SERPL-SCNC: 34 MMOL/L (ref 21–32)
CREAT SERPL-MCNC: 1.5 MG/DL (ref 0.6–1.2)
DEPRECATED RDW RBC AUTO: 14.4 % (ref 12.4–15.4)
EOSINOPHIL # BLD: 0 K/UL (ref 0–0.6)
EOSINOPHIL NFR BLD: 0 %
GFR SERPLBLD CREATININE-BSD FMLA CKD-EPI: 34 ML/MIN/{1.73_M2}
GLUCOSE BLD-MCNC: 110 MG/DL (ref 70–99)
GLUCOSE BLD-MCNC: 148 MG/DL (ref 70–99)
GLUCOSE BLD-MCNC: 175 MG/DL (ref 70–99)
GLUCOSE BLD-MCNC: 233 MG/DL (ref 70–99)
GLUCOSE SERPL-MCNC: 115 MG/DL (ref 70–99)
HCT VFR BLD AUTO: 32.2 % (ref 36–48)
HGB BLD-MCNC: 10.6 G/DL (ref 12–16)
LYMPHOCYTES # BLD: 0.6 K/UL (ref 1–5.1)
LYMPHOCYTES NFR BLD: 7.6 %
MCH RBC QN AUTO: 30.3 PG (ref 26–34)
MCHC RBC AUTO-ENTMCNC: 32.9 G/DL (ref 31–36)
MCV RBC AUTO: 91.9 FL (ref 80–100)
MONOCYTES # BLD: 0.5 K/UL (ref 0–1.3)
MONOCYTES NFR BLD: 6 %
NEUTROPHILS # BLD: 7 K/UL (ref 1.7–7.7)
NEUTROPHILS NFR BLD: 86.4 %
PERFORMED ON: ABNORMAL
PLATELET # BLD AUTO: 162 K/UL (ref 135–450)
PMV BLD AUTO: 8.7 FL (ref 5–10.5)
POTASSIUM SERPL-SCNC: 4.1 MMOL/L (ref 3.5–5.1)
PROT SERPL-MCNC: 6.4 G/DL (ref 6.4–8.2)
RBC # BLD AUTO: 3.51 M/UL (ref 4–5.2)
SODIUM SERPL-SCNC: 142 MMOL/L (ref 136–145)
WBC # BLD AUTO: 8.1 K/UL (ref 4–11)

## 2024-01-05 PROCEDURE — 2700000000 HC OXYGEN THERAPY PER DAY

## 2024-01-05 PROCEDURE — 80053 COMPREHEN METABOLIC PANEL: CPT

## 2024-01-05 PROCEDURE — 6360000002 HC RX W HCPCS: Performed by: STUDENT IN AN ORGANIZED HEALTH CARE EDUCATION/TRAINING PROGRAM

## 2024-01-05 PROCEDURE — 94669 MECHANICAL CHEST WALL OSCILL: CPT

## 2024-01-05 PROCEDURE — 1200000000 HC SEMI PRIVATE

## 2024-01-05 PROCEDURE — 6370000000 HC RX 637 (ALT 250 FOR IP): Performed by: INTERNAL MEDICINE

## 2024-01-05 PROCEDURE — 94640 AIRWAY INHALATION TREATMENT: CPT

## 2024-01-05 PROCEDURE — 99232 SBSQ HOSP IP/OBS MODERATE 35: CPT | Performed by: INTERNAL MEDICINE

## 2024-01-05 PROCEDURE — 6360000002 HC RX W HCPCS: Performed by: INTERNAL MEDICINE

## 2024-01-05 PROCEDURE — 85025 COMPLETE CBC W/AUTO DIFF WBC: CPT

## 2024-01-05 PROCEDURE — 94761 N-INVAS EAR/PLS OXIMETRY MLT: CPT

## 2024-01-05 PROCEDURE — 2580000003 HC RX 258: Performed by: INTERNAL MEDICINE

## 2024-01-05 PROCEDURE — 36415 COLL VENOUS BLD VENIPUNCTURE: CPT

## 2024-01-05 RX ADMIN — DEXAMETHASONE 6 MG: 4 TABLET ORAL at 09:53

## 2024-01-05 RX ADMIN — AZITHROMYCIN MONOHYDRATE 500 MG: 500 INJECTION, POWDER, LYOPHILIZED, FOR SOLUTION INTRAVENOUS at 00:35

## 2024-01-05 RX ADMIN — SERTRALINE HYDROCHLORIDE 50 MG: 50 TABLET ORAL at 09:54

## 2024-01-05 RX ADMIN — APIXABAN 2.5 MG: 5 TABLET, FILM COATED ORAL at 09:54

## 2024-01-05 RX ADMIN — METOPROLOL TARTRATE 25 MG: 25 TABLET, FILM COATED ORAL at 20:48

## 2024-01-05 RX ADMIN — Medication 2 PUFF: at 08:15

## 2024-01-05 RX ADMIN — GUAIFENESIN 600 MG: 600 TABLET ORAL at 09:54

## 2024-01-05 RX ADMIN — BUSPIRONE HYDROCHLORIDE 5 MG: 5 TABLET ORAL at 10:07

## 2024-01-05 RX ADMIN — INSULIN LISPRO 2 UNITS: 100 INJECTION, SOLUTION INTRAVENOUS; SUBCUTANEOUS at 16:39

## 2024-01-05 RX ADMIN — PANTOPRAZOLE SODIUM 40 MG: 40 TABLET, DELAYED RELEASE ORAL at 09:54

## 2024-01-05 RX ADMIN — APIXABAN 2.5 MG: 5 TABLET, FILM COATED ORAL at 20:48

## 2024-01-05 RX ADMIN — BUSPIRONE HYDROCHLORIDE 5 MG: 5 TABLET ORAL at 18:50

## 2024-01-05 RX ADMIN — SODIUM CHLORIDE, PRESERVATIVE FREE 10 ML: 5 INJECTION INTRAVENOUS at 09:54

## 2024-01-05 RX ADMIN — ATORVASTATIN CALCIUM 40 MG: 40 TABLET, FILM COATED ORAL at 20:48

## 2024-01-05 RX ADMIN — METOPROLOL TARTRATE 25 MG: 25 TABLET, FILM COATED ORAL at 09:53

## 2024-01-05 RX ADMIN — Medication 2 PUFF: at 19:21

## 2024-01-05 RX ADMIN — TIOTROPIUM BROMIDE INHALATION SPRAY 2 PUFF: 3.12 SPRAY, METERED RESPIRATORY (INHALATION) at 08:15

## 2024-01-05 RX ADMIN — SENNOSIDES 8.6 MG: 8.6 TABLET, FILM COATED ORAL at 10:07

## 2024-01-05 RX ADMIN — GUAIFENESIN 600 MG: 600 TABLET ORAL at 20:48

## 2024-01-05 RX ADMIN — ASPIRIN 81 MG: 81 TABLET, CHEWABLE ORAL at 09:54

## 2024-01-05 ASSESSMENT — PAIN SCALES - GENERAL
PAINLEVEL_OUTOF10: 0
PAINLEVEL_OUTOF10: 0

## 2024-01-05 NOTE — TELEPHONE ENCOUNTER
Granddaughter Hafsa called stating that [facility] needs an annual renewal for pt oxygen. Spoke with Italia at [facility] to confirm that pt needs recertified.  Pt will have to have a visit with new qualifying testing and oxygen order.  Pt is currently admitted.  Check to see if they do any testing prior to d/c before scheduling pt for an appt.

## 2024-01-05 NOTE — PLAN OF CARE
Problem: Discharge Planning  Goal: Discharge to home or other facility with appropriate resources  Outcome: Progressing  Flowsheets (Taken 1/5/2024 0900)  Discharge to home or other facility with appropriate resources: Identify barriers to discharge with patient and caregiver     Problem: Safety - Adult  Goal: Free from fall injury  Outcome: Progressing  Flowsheets (Taken 1/5/2024 0858)  Free From Fall Injury: Instruct family/caregiver on patient safety     Problem: Chronic Conditions and Co-morbidities  Goal: Patient's chronic conditions and co-morbidity symptoms are monitored and maintained or improved  Outcome: Progressing  Flowsheets (Taken 1/5/2024 0900)  Care Plan - Patient's Chronic Conditions and Co-Morbidity Symptoms are Monitored and Maintained or Improved: Monitor and assess patient's chronic conditions and comorbid symptoms for stability, deterioration, or improvement     Problem: Neurosensory - Adult  Goal: Achieves stable or improved neurological status  Outcome: Progressing  Flowsheets (Taken 1/5/2024 0900)  Achieves stable or improved neurological status: Assess for and report changes in neurological status     Problem: Respiratory - Adult  Goal: Achieves optimal ventilation and oxygenation  Outcome: Progressing  Flowsheets (Taken 1/5/2024 0900)  Achieves optimal ventilation and oxygenation:   Assess for changes in respiratory status   Assess for changes in mentation and behavior   Position to facilitate oxygenation and minimize respiratory effort   Oxygen supplementation based on oxygen saturation or arterial blood gases     Problem: Cardiovascular - Adult  Goal: Maintains optimal cardiac output and hemodynamic stability  Outcome: Progressing  Flowsheets (Taken 1/5/2024 0900)  Maintains optimal cardiac output and hemodynamic stability: Monitor blood pressure and heart rate     Problem: Skin/Tissue Integrity - Adult  Goal: Skin integrity remains intact  Outcome: Progressing  Flowsheets  Taken

## 2024-01-05 NOTE — CARE COORDINATION
Patient refused OT today and stated she was not feeling well. Family and patient prefer home with HHC. [facility] following . Patient has home O2 2.5 liters is her baseline. She cannot recall provider. COVID positive in isolation precautions. Will follow. [employer].

## 2024-01-06 LAB
ALBUMIN SERPL-MCNC: 3.1 G/DL (ref 3.4–5)
ALBUMIN/GLOB SERPL: 0.9 {RATIO} (ref 1.1–2.2)
ALP SERPL-CCNC: 78 U/L (ref 40–129)
ALT SERPL-CCNC: 26 U/L (ref 10–40)
ANION GAP SERPL CALCULATED.3IONS-SCNC: 9 MMOL/L (ref 3–16)
AST SERPL-CCNC: 31 U/L (ref 15–37)
BACTERIA BLD CULT ORG #2: NORMAL
BACTERIA BLD CULT: NORMAL
BILIRUB SERPL-MCNC: <0.2 MG/DL (ref 0–1)
BUN SERPL-MCNC: 55 MG/DL (ref 7–20)
CALCIUM SERPL-MCNC: 9.4 MG/DL (ref 8.3–10.6)
CHLORIDE SERPL-SCNC: 102 MMOL/L (ref 99–110)
CO2 SERPL-SCNC: 34 MMOL/L (ref 21–32)
CREAT SERPL-MCNC: 1.1 MG/DL (ref 0.6–1.2)
GFR SERPLBLD CREATININE-BSD FMLA CKD-EPI: 50 ML/MIN/{1.73_M2}
GLUCOSE BLD-MCNC: 106 MG/DL (ref 70–99)
GLUCOSE BLD-MCNC: 116 MG/DL (ref 70–99)
GLUCOSE BLD-MCNC: 158 MG/DL (ref 70–99)
GLUCOSE BLD-MCNC: 179 MG/DL (ref 70–99)
GLUCOSE SERPL-MCNC: 95 MG/DL (ref 70–99)
PERFORMED ON: ABNORMAL
POTASSIUM SERPL-SCNC: 4.4 MMOL/L (ref 3.5–5.1)
PROT SERPL-MCNC: 6.5 G/DL (ref 6.4–8.2)
SODIUM SERPL-SCNC: 145 MMOL/L (ref 136–145)

## 2024-01-06 PROCEDURE — 80053 COMPREHEN METABOLIC PANEL: CPT

## 2024-01-06 PROCEDURE — 1200000000 HC SEMI PRIVATE

## 2024-01-06 PROCEDURE — 94640 AIRWAY INHALATION TREATMENT: CPT

## 2024-01-06 PROCEDURE — 6370000000 HC RX 637 (ALT 250 FOR IP): Performed by: INTERNAL MEDICINE

## 2024-01-06 PROCEDURE — 6360000002 HC RX W HCPCS: Performed by: STUDENT IN AN ORGANIZED HEALTH CARE EDUCATION/TRAINING PROGRAM

## 2024-01-06 PROCEDURE — 99232 SBSQ HOSP IP/OBS MODERATE 35: CPT | Performed by: INTERNAL MEDICINE

## 2024-01-06 PROCEDURE — 97110 THERAPEUTIC EXERCISES: CPT

## 2024-01-06 PROCEDURE — 94761 N-INVAS EAR/PLS OXIMETRY MLT: CPT

## 2024-01-06 PROCEDURE — 36415 COLL VENOUS BLD VENIPUNCTURE: CPT

## 2024-01-06 PROCEDURE — 97530 THERAPEUTIC ACTIVITIES: CPT

## 2024-01-06 PROCEDURE — 2700000000 HC OXYGEN THERAPY PER DAY

## 2024-01-06 PROCEDURE — 2580000003 HC RX 258: Performed by: INTERNAL MEDICINE

## 2024-01-06 PROCEDURE — 6360000002 HC RX W HCPCS: Performed by: INTERNAL MEDICINE

## 2024-01-06 RX ADMIN — BUSPIRONE HYDROCHLORIDE 5 MG: 5 TABLET ORAL at 21:05

## 2024-01-06 RX ADMIN — PANTOPRAZOLE SODIUM 40 MG: 40 TABLET, DELAYED RELEASE ORAL at 08:56

## 2024-01-06 RX ADMIN — BUSPIRONE HYDROCHLORIDE 5 MG: 5 TABLET ORAL at 12:18

## 2024-01-06 RX ADMIN — SODIUM CHLORIDE, PRESERVATIVE FREE 10 ML: 5 INJECTION INTRAVENOUS at 21:07

## 2024-01-06 RX ADMIN — APIXABAN 2.5 MG: 5 TABLET, FILM COATED ORAL at 21:05

## 2024-01-06 RX ADMIN — TIOTROPIUM BROMIDE INHALATION SPRAY 2 PUFF: 3.12 SPRAY, METERED RESPIRATORY (INHALATION) at 07:22

## 2024-01-06 RX ADMIN — BUSPIRONE HYDROCHLORIDE 5 MG: 5 TABLET ORAL at 02:53

## 2024-01-06 RX ADMIN — SODIUM CHLORIDE, PRESERVATIVE FREE 10 ML: 5 INJECTION INTRAVENOUS at 08:56

## 2024-01-06 RX ADMIN — GUAIFENESIN 600 MG: 600 TABLET ORAL at 21:05

## 2024-01-06 RX ADMIN — METOPROLOL TARTRATE 25 MG: 25 TABLET, FILM COATED ORAL at 08:56

## 2024-01-06 RX ADMIN — ASPIRIN 81 MG: 81 TABLET, CHEWABLE ORAL at 08:55

## 2024-01-06 RX ADMIN — AZITHROMYCIN MONOHYDRATE 500 MG: 500 INJECTION, POWDER, LYOPHILIZED, FOR SOLUTION INTRAVENOUS at 00:32

## 2024-01-06 RX ADMIN — METOPROLOL TARTRATE 25 MG: 25 TABLET, FILM COATED ORAL at 21:05

## 2024-01-06 RX ADMIN — ATORVASTATIN CALCIUM 40 MG: 40 TABLET, FILM COATED ORAL at 21:05

## 2024-01-06 RX ADMIN — Medication 2 PUFF: at 07:23

## 2024-01-06 RX ADMIN — APIXABAN 2.5 MG: 5 TABLET, FILM COATED ORAL at 08:56

## 2024-01-06 RX ADMIN — SERTRALINE HYDROCHLORIDE 50 MG: 50 TABLET ORAL at 08:56

## 2024-01-06 RX ADMIN — DEXAMETHASONE 6 MG: 4 TABLET ORAL at 08:55

## 2024-01-06 RX ADMIN — GUAIFENESIN 600 MG: 600 TABLET ORAL at 08:56

## 2024-01-06 NOTE — PLAN OF CARE
Problem: Discharge Planning  Goal: Discharge to home or other facility with appropriate resources  Outcome: Progressing     Problem: Safety - Adult  Goal: Free from fall injury  Outcome: Progressing     Problem: Chronic Conditions and Co-morbidities  Goal: Patient's chronic conditions and co-morbidity symptoms are monitored and maintained or improved  Outcome: Progressing     Problem: Neurosensory - Adult  Goal: Achieves stable or improved neurological status  Outcome: Progressing     Problem: Respiratory - Adult  Goal: Achieves optimal ventilation and oxygenation  Outcome: Progressing  Flowsheets (Taken 1/6/2024 0830)  Achieves optimal ventilation and oxygenation:   Assess for changes in respiratory status   Assess for changes in mentation and behavior     Problem: Cardiovascular - Adult  Goal: Maintains optimal cardiac output and hemodynamic stability  Outcome: Progressing     Problem: Skin/Tissue Integrity - Adult  Goal: Skin integrity remains intact  Outcome: Progressing  Flowsheets (Taken 1/6/2024 0830)  Skin Integrity Remains Intact:   Monitor for areas of redness and/or skin breakdown   Assess vascular access sites hourly  Goal: Oral mucous membranes remain intact  Outcome: Progressing     Problem: Musculoskeletal - Adult  Goal: Return mobility to safest level of function  Outcome: Progressing  Flowsheets (Taken 1/6/2024 0830)  Return Mobility to Safest Level of Function:   Assess patient stability and activity tolerance for standing, transferring and ambulating with or without assistive devices   Assist with transfers and ambulation using safe patient handling equipment as needed     Problem: Gastrointestinal - Adult  Goal: Minimal or absence of nausea and vomiting  Outcome: Progressing  Goal: Maintains or returns to baseline bowel function  Outcome: Progressing  Goal: Maintains adequate nutritional intake  Outcome: Progressing     Problem: Genitourinary - Adult  Goal: Absence of urinary

## 2024-01-07 LAB
ALBUMIN SERPL-MCNC: 3.2 G/DL (ref 3.4–5)
ANION GAP SERPL CALCULATED.3IONS-SCNC: 9 MMOL/L (ref 3–16)
BASOPHILS # BLD: 0 K/UL (ref 0–0.2)
BASOPHILS NFR BLD: 0.1 %
BUN SERPL-MCNC: 51 MG/DL (ref 7–20)
CALCIUM SERPL-MCNC: 10.3 MG/DL (ref 8.3–10.6)
CHLORIDE SERPL-SCNC: 102 MMOL/L (ref 99–110)
CO2 SERPL-SCNC: 31 MMOL/L (ref 21–32)
CREAT SERPL-MCNC: 0.9 MG/DL (ref 0.6–1.2)
DEPRECATED RDW RBC AUTO: 14.8 % (ref 12.4–15.4)
EOSINOPHIL # BLD: 0 K/UL (ref 0–0.6)
EOSINOPHIL NFR BLD: 0.4 %
GFR SERPLBLD CREATININE-BSD FMLA CKD-EPI: >60 ML/MIN/{1.73_M2}
GLUCOSE BLD-MCNC: 119 MG/DL (ref 70–99)
GLUCOSE BLD-MCNC: 170 MG/DL (ref 70–99)
GLUCOSE BLD-MCNC: 217 MG/DL (ref 70–99)
GLUCOSE BLD-MCNC: 234 MG/DL (ref 70–99)
GLUCOSE SERPL-MCNC: 93 MG/DL (ref 70–99)
HCT VFR BLD AUTO: 34 % (ref 36–48)
HEMOCCULT SP1 STL QL: ABNORMAL
HGB BLD-MCNC: 11 G/DL (ref 12–16)
LYMPHOCYTES # BLD: 1 K/UL (ref 1–5.1)
LYMPHOCYTES NFR BLD: 13 %
MCH RBC QN AUTO: 29.5 PG (ref 26–34)
MCHC RBC AUTO-ENTMCNC: 32.3 G/DL (ref 31–36)
MCV RBC AUTO: 91.5 FL (ref 80–100)
MONOCYTES # BLD: 0.3 K/UL (ref 0–1.3)
MONOCYTES NFR BLD: 4.2 %
NEUTROPHILS # BLD: 6.2 K/UL (ref 1.7–7.7)
NEUTROPHILS NFR BLD: 82.3 %
PERFORMED ON: ABNORMAL
PHOSPHATE SERPL-MCNC: 1.9 MG/DL (ref 2.5–4.9)
PLATELET # BLD AUTO: 196 K/UL (ref 135–450)
PMV BLD AUTO: 8.9 FL (ref 5–10.5)
POTASSIUM SERPL-SCNC: 5.2 MMOL/L (ref 3.5–5.1)
RBC # BLD AUTO: 3.72 M/UL (ref 4–5.2)
SODIUM SERPL-SCNC: 142 MMOL/L (ref 136–145)
WBC # BLD AUTO: 7.6 K/UL (ref 4–11)

## 2024-01-07 PROCEDURE — 82270 OCCULT BLOOD FECES: CPT

## 2024-01-07 PROCEDURE — 99232 SBSQ HOSP IP/OBS MODERATE 35: CPT | Performed by: INTERNAL MEDICINE

## 2024-01-07 PROCEDURE — 80069 RENAL FUNCTION PANEL: CPT

## 2024-01-07 PROCEDURE — 94761 N-INVAS EAR/PLS OXIMETRY MLT: CPT

## 2024-01-07 PROCEDURE — 6370000000 HC RX 637 (ALT 250 FOR IP): Performed by: INTERNAL MEDICINE

## 2024-01-07 PROCEDURE — 2700000000 HC OXYGEN THERAPY PER DAY

## 2024-01-07 PROCEDURE — 6370000000 HC RX 637 (ALT 250 FOR IP): Performed by: NURSE PRACTITIONER

## 2024-01-07 PROCEDURE — 36415 COLL VENOUS BLD VENIPUNCTURE: CPT

## 2024-01-07 PROCEDURE — 2580000003 HC RX 258: Performed by: INTERNAL MEDICINE

## 2024-01-07 PROCEDURE — 6360000002 HC RX W HCPCS: Performed by: STUDENT IN AN ORGANIZED HEALTH CARE EDUCATION/TRAINING PROGRAM

## 2024-01-07 PROCEDURE — 6360000002 HC RX W HCPCS: Performed by: INTERNAL MEDICINE

## 2024-01-07 PROCEDURE — 94640 AIRWAY INHALATION TREATMENT: CPT

## 2024-01-07 PROCEDURE — 1200000000 HC SEMI PRIVATE

## 2024-01-07 PROCEDURE — 85025 COMPLETE CBC W/AUTO DIFF WBC: CPT

## 2024-01-07 PROCEDURE — 87506 IADNA-DNA/RNA PROBE TQ 6-11: CPT

## 2024-01-07 RX ORDER — HYDROCORTISONE ACETATE 25 MG/1
25 SUPPOSITORY RECTAL 2 TIMES DAILY
Status: DISCONTINUED | OUTPATIENT
Start: 2024-01-07 | End: 2024-01-09 | Stop reason: HOSPADM

## 2024-01-07 RX ORDER — AMLODIPINE BESYLATE 5 MG/1
10 TABLET ORAL DAILY
Status: DISCONTINUED | OUTPATIENT
Start: 2024-01-07 | End: 2024-01-09 | Stop reason: HOSPADM

## 2024-01-07 RX ADMIN — SODIUM CHLORIDE, PRESERVATIVE FREE 10 ML: 5 INJECTION INTRAVENOUS at 21:02

## 2024-01-07 RX ADMIN — AMLODIPINE BESYLATE 10 MG: 5 TABLET ORAL at 12:09

## 2024-01-07 RX ADMIN — AZITHROMYCIN MONOHYDRATE 500 MG: 500 INJECTION, POWDER, LYOPHILIZED, FOR SOLUTION INTRAVENOUS at 00:49

## 2024-01-07 RX ADMIN — HYDROCORTISONE ACETATE 25 MG: 25 SUPPOSITORY RECTAL at 21:02

## 2024-01-07 RX ADMIN — HYDROCORTISONE ACETATE 25 MG: 25 SUPPOSITORY RECTAL at 14:10

## 2024-01-07 RX ADMIN — SERTRALINE HYDROCHLORIDE 50 MG: 50 TABLET ORAL at 07:50

## 2024-01-07 RX ADMIN — DEXAMETHASONE 6 MG: 4 TABLET ORAL at 07:51

## 2024-01-07 RX ADMIN — GUAIFENESIN 600 MG: 600 TABLET ORAL at 21:02

## 2024-01-07 RX ADMIN — BUSPIRONE HYDROCHLORIDE 5 MG: 5 TABLET ORAL at 07:59

## 2024-01-07 RX ADMIN — Medication 2 PUFF: at 19:33

## 2024-01-07 RX ADMIN — ATORVASTATIN CALCIUM 40 MG: 40 TABLET, FILM COATED ORAL at 21:02

## 2024-01-07 RX ADMIN — PANTOPRAZOLE SODIUM 40 MG: 40 TABLET, DELAYED RELEASE ORAL at 07:50

## 2024-01-07 RX ADMIN — ASPIRIN 81 MG: 81 TABLET, CHEWABLE ORAL at 07:50

## 2024-01-07 RX ADMIN — TIOTROPIUM BROMIDE INHALATION SPRAY 2 PUFF: 3.12 SPRAY, METERED RESPIRATORY (INHALATION) at 08:42

## 2024-01-07 RX ADMIN — BUSPIRONE HYDROCHLORIDE 5 MG: 5 TABLET ORAL at 16:09

## 2024-01-07 RX ADMIN — METOPROLOL TARTRATE 25 MG: 25 TABLET, FILM COATED ORAL at 07:50

## 2024-01-07 RX ADMIN — PHENOL 1 SPRAY: 1.5 LIQUID ORAL at 05:08

## 2024-01-07 RX ADMIN — Medication 5 MG: at 21:02

## 2024-01-07 RX ADMIN — Medication 2 PUFF: at 08:42

## 2024-01-07 RX ADMIN — GUAIFENESIN 600 MG: 600 TABLET ORAL at 07:51

## 2024-01-07 RX ADMIN — METOPROLOL TARTRATE 25 MG: 25 TABLET, FILM COATED ORAL at 21:02

## 2024-01-07 RX ADMIN — SODIUM CHLORIDE, PRESERVATIVE FREE 10 ML: 5 INJECTION INTRAVENOUS at 07:51

## 2024-01-07 RX ADMIN — INSULIN LISPRO 2 UNITS: 100 INJECTION, SOLUTION INTRAVENOUS; SUBCUTANEOUS at 17:19

## 2024-01-07 ASSESSMENT — PAIN DESCRIPTION - ORIENTATION: ORIENTATION: RIGHT

## 2024-01-07 ASSESSMENT — PAIN DESCRIPTION - LOCATION: LOCATION: THROAT

## 2024-01-07 NOTE — PLAN OF CARE
Problem: Discharge Planning  Goal: Discharge to home or other facility with appropriate resources  1/6/2024 2235 by Susan Canada RN  Outcome: Progressing  1/6/2024 0915 by Claritza Benites RN  Outcome: Progressing     Problem: Safety - Adult  Goal: Free from fall injury  1/6/2024 2235 by Susan Canada RN  Outcome: Progressing  1/6/2024 0915 by Claritza Benites RN  Outcome: Progressing     Problem: Chronic Conditions and Co-morbidities  Goal: Patient's chronic conditions and co-morbidity symptoms are monitored and maintained or improved  1/6/2024 2235 by Susan Canada RN  Outcome: Progressing  1/6/2024 0915 by Claritza Benites RN  Outcome: Progressing     Problem: Neurosensory - Adult  Goal: Achieves stable or improved neurological status  1/6/2024 2235 by Susan Canada RN  Outcome: Progressing  1/6/2024 0915 by Claritza Benites RN  Outcome: Progressing     Problem: Respiratory - Adult  Goal: Achieves optimal ventilation and oxygenation  1/6/2024 2235 by Susan Canada RN  Outcome: Progressing  1/6/2024 0915 by Claritza Benites RN  Outcome: Progressing  Flowsheets (Taken 1/6/2024 0830)  Achieves optimal ventilation and oxygenation:   Assess for changes in respiratory status   Assess for changes in mentation and behavior     Problem: Cardiovascular - Adult  Goal: Maintains optimal cardiac output and hemodynamic stability  1/6/2024 2235 by Susan Canada RN  Outcome: Progressing  1/6/2024 0915 by Claritza Benites RN  Outcome: Progressing     Problem: Skin/Tissue Integrity - Adult  Goal: Skin integrity remains intact  1/6/2024 2235 by Susan Canada RN  Outcome: Progressing  1/6/2024 0915 by Claritza Benites RN  Outcome: Progressing  Flowsheets (Taken 1/6/2024 0830)  Skin Integrity Remains Intact:   Monitor for areas of redness and/or skin breakdown   Assess vascular access sites hourly  Goal: Oral mucous membranes remain intact  1/6/2024 2235 by Susan Canada RN  Outcome: Progressing  1/6/2024 0915 by Claritza Benites

## 2024-01-07 NOTE — PLAN OF CARE
Problem: Discharge Planning  Goal: Discharge to home or other facility with appropriate resources  1/7/2024 0823 by Claritza Benites RN  Outcome: Progressing  Flowsheets (Taken 1/7/2024 0752)  Discharge to home or other facility with appropriate resources:   Arrange for needed discharge resources and transportation as appropriate   Identify barriers to discharge with patient and caregiver  1/6/2024 2235 by Susan Canada RN  Outcome: Progressing     Problem: Safety - Adult  Goal: Free from fall injury  1/7/2024 0823 by Claritza Benites RN  Outcome: Progressing  1/6/2024 2235 by Susan Canada RN  Outcome: Progressing     Problem: Chronic Conditions and Co-morbidities  Goal: Patient's chronic conditions and co-morbidity symptoms are monitored and maintained or improved  1/7/2024 0823 by Claritza Benites RN  Outcome: Progressing  1/6/2024 2235 by Susan Canada RN  Outcome: Progressing     Problem: Neurosensory - Adult  Goal: Achieves stable or improved neurological status  1/7/2024 0823 by Claritza Benites RN  Outcome: Progressing  1/6/2024 2235 by Susan Canada RN  Outcome: Progressing     Problem: Respiratory - Adult  Goal: Achieves optimal ventilation and oxygenation  1/7/2024 0823 by Claritza Benites RN  Outcome: Progressing  1/6/2024 2235 by Susan Canada RN  Outcome: Progressing     Problem: Cardiovascular - Adult  Goal: Maintains optimal cardiac output and hemodynamic stability  1/7/2024 0823 by Claritza Benites RN  Outcome: Progressing  1/6/2024 2235 by Susan Canada RN  Outcome: Progressing     Problem: Skin/Tissue Integrity - Adult  Goal: Skin integrity remains intact  1/7/2024 0823 by Claritza Benites RN  Outcome: Progressing  Flowsheets  Taken 1/7/2024 0752 by Claritza Benites RN  Skin Integrity Remains Intact:   Monitor for areas of redness and/or skin breakdown   Assess vascular access sites hourly  Taken 1/6/2024 2236 by Susan Canada RN  Skin Integrity Remains Intact:   Monitor for areas of redness and/or skin

## 2024-01-07 NOTE — CONSULTS
CONSULTATION  Terri Smith is a 83 y.o. female asked to see us in consultation by  Jackson Ontiveros MD & Joyce Tesfaye MD for evaluation of rectal bleeding.    Ms. Smith is an 83 year old female with past medical history of HTN, COPD w/ chronic O2 dependence (2-3L via NC), CAD (s/p CABG 9/2019), CHF, CKD3, DM2, pAfib on eliquis who presented to the ER with weakness, diarrhea and shortness of breath.  She was diagnosed with Covid-19 and COPD exacerbation.    Yesterday she began having rectal bleeding.  Although she was having diarrhea on admission, she says it has resolved.  She takes laxatives daily to avoid constipation.  No abdominal pain.  She underwent colonoscopy 8/2023 for rectal bleeding with findings of a polyp and internal hemorrhoids, and flex sig for recurrent rectal bleeding 9/2023 with findings of internal hemorrhoids.  CTA in August had shown active bleeding in the rectum.  She reports improvement with Anusol suppositories in the past.   Her hgb is relatively stable at 11 today.        Medications Prior to Admission: ELIQUIS 2.5 MG TABS tablet, Take 1 tablet by mouth 2 times daily  metoprolol tartrate (LOPRESSOR) 25 MG tablet, Take 1 tablet by mouth in the morning and at bedtime  predniSONE (DELTASONE) 10 MG tablet, 3 qd- 3 days 2 qd- 3 days, 1 qd- 3 days  aspirin 81 MG chewable tablet, Take 1 tablet by mouth daily Resume ASA on Monday. Monitor for any recurrent GI bleed.  lactobacillus (CULTURELLE) capsule, Take 1 capsule by mouth daily (with breakfast)  psyllium (METAMUCIL) 58.12 % PACK packet, Take 1 packet by mouth daily  atorvastatin (LIPITOR) 40 MG tablet, Take 1 tablet by mouth at bedtime  busPIRone (BUSPAR) 5 MG tablet, Take 1 tablet by mouth every 8 hours as needed (anxiety)  magnesium oxide (MAG-OX) 400 (240 Mg) MG tablet, Take 1 tablet by mouth daily  melatonin 5 MG TBDP 
    [email or web address]  Nephrology Consult Note           Reason for Consult: NADJA on CKD III  Requesting Physician:  Dr. Kerr    Chief Complaint:    Chief Complaint   Patient presents with    Abdominal Pain     Loose stools, feeling SOB.  Just Dx with pneumonia at [facility]      History of Present Illness on 1/4/2024:    83 y.o. yo female with PMH of CAD S/P CABG, CHF with reduced EF, COPD, GERD, PAF and HLD, chronic respiratory failure with 2.5 L/min of oxygen at home, chronic kidney disease stage III with baseline creatinine of low to mid ones, followed by Dr. Moscoso who is admitted for generalized weakness, dyspnea and diarrhea with COVID  Patient creatinine increased to 1.7 and nephrology has been consulted  Patient was at [facility] between 12/15-12/22 for dizziness and and was diagnosed to have possible orthostasis along with Ménière's disease and was started on as needed meclizine.  During that admission, due to history of GI bleed and anemia on Xarelto was stopped  Patient reports diarrhea, she has not been eating or drinking that well; she got a dose of IV Lasix on 1/2 and received torsemide and 1/4 at 20 mg daily    Past Medical History:        Diagnosis Date    NADJA (acute kidney injury) ([facility])     Asthma     Atrial fibrillation with RVR ([facility])     CAD (coronary artery disease)     CHF (congestive heart failure) ([facility])     unsure    Chronic anxiety     COPD (chronic obstructive pulmonary disease) ([facility])     COPD (chronic obstructive pulmonary disease) ([facility]) 08/19/2023    GERD (gastroesophageal reflux disease) 09/09/2021    Heart attack ([facility]) 2019    History of blood transfusion     Hyperlipidemia     Hypertension     MRSA (methicillin resistant staph aureus) culture positive 09/22/2019    + resp cx    OA (osteoarthritis) 08/12/2014    Thyroid disease        Past Surgical History:        Procedure Laterality Date    BRONCHOSCOPY  09/08/2019    Dr. Wheatley - w/BAL    CARDIAC CATHETERIZATION  09/05/2019     
  PULM/CCM    Please see the consult done earlier    Miguel Gonzalez MD  
1/3/24 @ 0729 notified Inf Dis of consult via PS,  He taking calls. Marsha Agosto  
1/3/24 @ 0731 notified Pulm of consult. Marsha Agosto  
1/7/24 @ 0838 notified GI of consult via REMY. Marsha Agosto  
Infectious Diseases   Consult Note      Reason for Consult:  COVID-19    Requesting Physician:  Dr. Fields      Date of Admission: 2024  Subjective:   CHIEF COMPLAINT:   none given       HPI:    Terri Smith is an 83yoF with history of PAD, afib, COPD  Chronic hypoxemic respiratory failure on baseline of 2.5L NC.                  Admission 12/15-23 with cc dizziness, vertigo, NADJA.   Acute on chronic respiratory failure treated with steroid.  COVID test was negative.  She did not receive any abx during that encounter.    Patient and her son note that she was not really well at the time of DC.      ED 24 - weakness, SOB, diarrhea.  New cough, not productive.    She was afebrile on arrival.    WBC was elevated to 18.9.  Procal was elevated to 1.47  COVID RT PCR was positive     Today she is AF on baseline oxygen.      Current abx:  Azithromycin 500 IV q24, s 1/3    Solumedrol 20 q8       Past Surgical History:       Diagnosis Date    NADJA (acute kidney injury) ([facility])     Asthma     Atrial fibrillation with RVR ([facility])     CAD (coronary artery disease)     CHF (congestive heart failure) ([facility])     unsure    Chronic anxiety     COPD (chronic obstructive pulmonary disease) ([facility])     COPD (chronic obstructive pulmonary disease) ([facility]) 2023    GERD (gastroesophageal reflux disease) 2021    Heart attack ([facility]) 2019    History of blood transfusion     Hyperlipidemia     Hypertension     MRSA (methicillin resistant staph aureus) culture positive 2019    + resp cx    OA (osteoarthritis) 2014    Thyroid disease          Procedure Laterality Date    BRONCHOSCOPY  2019    Dr. Wheatley - w/BAL    CARDIAC CATHETERIZATION  2019    Dr. Palomares     SECTION      COLONOSCOPY N/A 2020    COLONOSCOPY DIAGNOSTIC performed by Rowdy Schmitz DO at [facility] ENDOSCOPY    COLONOSCOPY N/A 10/22/2021    COLONOSCOPY POLYPECTOMY SNARE/COLD BIOPSY performed by Celestine Lester 
PS Dr. Naik for nephro consult on 1/4 @ 10:35AM  SOLA LEAVITT  
    LIVER PROFILE:   Recent Labs     01/02/24  2211   AST 11*   ALT <5*   BILITOT 0.5   ALKPHOS 94         Imaging:  I have reviewed radiology images personally.    XR CHEST PORTABLE   Final Result   Resolving pulmonary interstitial edema.      Chronic obstructive lung changes with probable underlying chronic   interstitial lung disease which is less prominent with no obvious infiltrate   or effusion..              Latest Reference Range & Units 01/02/24 21:58   INFLUENZA A NOT DETECTED  NOT DETECTED   INFLUENZA B NOT DETECTED  NOT DETECTED   SARS-CoV-2 RNA, RT PCR NOT DETECTED  DETECTED !      Latest Reference Range & Units 01/02/24 22:11   pH, Zach 7.350 - 7.450  7.557 (H)   pCO2, Zach 40.0 - 50.0 mmHg 33.6 (L)   pO2, Zach 25.0 - 40.0 mmHg 114.8 (H)   HCO3, Venous 23.0 - 29.0 mmol/L 29.2 (H)   TC02 (Calc), Zach Not Established mmol/L 30   Base Excess, Zach -3.0 - 3.0 mmol/L 7.0 (H)   MetHgb, Zach <1.5 % 0.6   O2 Sat, Zach Not Established % 98       Echocardiogram: Summary   Left ventricular systolic function is normal with ejection fraction   estimated at 55-60 %.   No regional wall motion abnormalities are noted.   Normal left ventricular diastolic filling pressure.   Moderate mitral regurgitation.   The maximal transaortic velocity is 2.97m/s which gives peak pressure   gradient= 35mmHg and mean pressure gradient= 17mmHg which is c/w   mild-moderate aortic stenosis.   Mild tricuspid regurgitation.   Systolic pulmonary artery pressure (SPAP) estimated at 42 mmHg (RA pressure   3 mmHg), c/w mild pulmonary hypertension.   1-7-2023 55-60% EF, global hypo,DD1, mild MR, AV sclerosis.       PFT:DATE OF PROCEDURE:  09/06/2019     Forced vital capacity moderately reduced, expiratory flow rates and FEV1  to FVC ratio severely reduced.  No significant improvement after inhaled  bronchodilator.  Total lung capacity normal, FRC and RV increased.   Single-breath diffusion capacity severely reduced.     IMPRESSION:  Severe

## 2024-01-08 LAB
BASOPHILS # BLD: 0 K/UL (ref 0–0.2)
BASOPHILS NFR BLD: 0.4 %
DEPRECATED RDW RBC AUTO: 14.6 % (ref 12.4–15.4)
EOSINOPHIL # BLD: 0 K/UL (ref 0–0.6)
EOSINOPHIL NFR BLD: 0.3 %
GLUCOSE BLD-MCNC: 111 MG/DL (ref 70–99)
GLUCOSE BLD-MCNC: 134 MG/DL (ref 70–99)
GLUCOSE BLD-MCNC: 197 MG/DL (ref 70–99)
GLUCOSE BLD-MCNC: 259 MG/DL (ref 70–99)
HCT VFR BLD AUTO: 32.4 % (ref 36–48)
HGB BLD-MCNC: 10.6 G/DL (ref 12–16)
LYMPHOCYTES # BLD: 1.3 K/UL (ref 1–5.1)
LYMPHOCYTES NFR BLD: 19.7 %
MCH RBC QN AUTO: 29.6 PG (ref 26–34)
MCHC RBC AUTO-ENTMCNC: 32.5 G/DL (ref 31–36)
MCV RBC AUTO: 91 FL (ref 80–100)
MONOCYTES # BLD: 0.4 K/UL (ref 0–1.3)
MONOCYTES NFR BLD: 5.4 %
NEUTROPHILS # BLD: 5 K/UL (ref 1.7–7.7)
NEUTROPHILS NFR BLD: 74.2 %
PERFORMED ON: ABNORMAL
PLATELET # BLD AUTO: 215 K/UL (ref 135–450)
PMV BLD AUTO: 9.3 FL (ref 5–10.5)
RBC # BLD AUTO: 3.56 M/UL (ref 4–5.2)
WBC # BLD AUTO: 6.8 K/UL (ref 4–11)

## 2024-01-08 PROCEDURE — 85025 COMPLETE CBC W/AUTO DIFF WBC: CPT

## 2024-01-08 PROCEDURE — 1200000000 HC SEMI PRIVATE

## 2024-01-08 PROCEDURE — 6370000000 HC RX 637 (ALT 250 FOR IP): Performed by: NURSE PRACTITIONER

## 2024-01-08 PROCEDURE — 36415 COLL VENOUS BLD VENIPUNCTURE: CPT

## 2024-01-08 PROCEDURE — 6370000000 HC RX 637 (ALT 250 FOR IP): Performed by: INTERNAL MEDICINE

## 2024-01-08 PROCEDURE — 94640 AIRWAY INHALATION TREATMENT: CPT

## 2024-01-08 PROCEDURE — 2580000003 HC RX 258: Performed by: INTERNAL MEDICINE

## 2024-01-08 PROCEDURE — 6360000002 HC RX W HCPCS: Performed by: STUDENT IN AN ORGANIZED HEALTH CARE EDUCATION/TRAINING PROGRAM

## 2024-01-08 PROCEDURE — 94761 N-INVAS EAR/PLS OXIMETRY MLT: CPT

## 2024-01-08 PROCEDURE — 2700000000 HC OXYGEN THERAPY PER DAY

## 2024-01-08 RX ORDER — TORSEMIDE 20 MG/1
10 TABLET ORAL DAILY
Status: DISCONTINUED | OUTPATIENT
Start: 2024-01-08 | End: 2024-01-09 | Stop reason: HOSPADM

## 2024-01-08 RX ORDER — HYDROCORTISONE ACETATE 25 MG/1
25 SUPPOSITORY RECTAL 2 TIMES DAILY
Qty: 11 SUPPOSITORY | Refills: 0
Start: 2024-01-08 | End: 2024-01-14

## 2024-01-08 RX ORDER — AMLODIPINE BESYLATE 10 MG/1
10 TABLET ORAL DAILY
Qty: 30 TABLET | Refills: 3
Start: 2024-01-09

## 2024-01-08 RX ADMIN — Medication 2 PUFF: at 20:25

## 2024-01-08 RX ADMIN — TIOTROPIUM BROMIDE INHALATION SPRAY 2 PUFF: 3.12 SPRAY, METERED RESPIRATORY (INHALATION) at 09:58

## 2024-01-08 RX ADMIN — HYDROCORTISONE ACETATE 25 MG: 25 SUPPOSITORY RECTAL at 09:27

## 2024-01-08 RX ADMIN — TORSEMIDE 10 MG: 20 TABLET ORAL at 14:06

## 2024-01-08 RX ADMIN — DEXAMETHASONE 6 MG: 4 TABLET ORAL at 09:26

## 2024-01-08 RX ADMIN — BUSPIRONE HYDROCHLORIDE 5 MG: 5 TABLET ORAL at 06:23

## 2024-01-08 RX ADMIN — ATORVASTATIN CALCIUM 40 MG: 40 TABLET, FILM COATED ORAL at 20:58

## 2024-01-08 RX ADMIN — INSULIN LISPRO 4 UNITS: 100 INJECTION, SOLUTION INTRAVENOUS; SUBCUTANEOUS at 17:01

## 2024-01-08 RX ADMIN — Medication 2 PUFF: at 09:58

## 2024-01-08 RX ADMIN — METOPROLOL TARTRATE 25 MG: 25 TABLET, FILM COATED ORAL at 09:27

## 2024-01-08 RX ADMIN — Medication 5 MG: at 20:58

## 2024-01-08 RX ADMIN — HYDROCORTISONE ACETATE 25 MG: 25 SUPPOSITORY RECTAL at 21:06

## 2024-01-08 RX ADMIN — GUAIFENESIN 600 MG: 600 TABLET ORAL at 20:58

## 2024-01-08 RX ADMIN — SERTRALINE HYDROCHLORIDE 50 MG: 50 TABLET ORAL at 09:27

## 2024-01-08 RX ADMIN — AMLODIPINE BESYLATE 10 MG: 5 TABLET ORAL at 09:27

## 2024-01-08 RX ADMIN — GUAIFENESIN 600 MG: 600 TABLET ORAL at 09:27

## 2024-01-08 RX ADMIN — METOPROLOL TARTRATE 25 MG: 25 TABLET, FILM COATED ORAL at 20:58

## 2024-01-08 RX ADMIN — ASPIRIN 81 MG: 81 TABLET, CHEWABLE ORAL at 09:27

## 2024-01-08 RX ADMIN — SODIUM CHLORIDE, PRESERVATIVE FREE 10 ML: 5 INJECTION INTRAVENOUS at 09:28

## 2024-01-08 RX ADMIN — PANTOPRAZOLE SODIUM 40 MG: 40 TABLET, DELAYED RELEASE ORAL at 09:27

## 2024-01-08 RX ADMIN — SODIUM CHLORIDE, PRESERVATIVE FREE 10 ML: 5 INJECTION INTRAVENOUS at 20:58

## 2024-01-08 NOTE — PROGRESS NOTES
Physician Progress Note      PATIENT:               SHARON HANEY  CSN #:                  [id number]  :                       1940  ADMIT DATE:       12/15/2023 4:31 PM  DISCH DATE:        2023 3:35 PM  RESPONDING  PROVIDER #:        Nadiya Hernandez MD          QUERY TEXT:    Pt admitted with dizziness. If possible, please document in progress notes and   discharge summary the relationship, if any, with dizziness.    The medical record reflects the following:  Risk Factors: dizziness, vertigo, R/O TIA, possible orthostasis, possible   meniere's disease, Benign paroxysmal vertigo    Clinical Indicators: neurology perspective the patient is suspicious to have   left lateral vertigo.    CT head without contrast: Moderate cortical atrophy and mild chronic   microischemic changes scattered in, the deep white matter with no acute   intracranial abnormality s    D/C note-   Dizziness, vertigo - R/O TIA, possible orthostasis, possible   meniere's disease, BPPV    Treatment: CT head, telemetry monitoring, neuro check every 4, Aspirin and   statin, Neurology  and ENT consult    Thank You Carol Valencia RN, CDS [email or web address]  Options provided:  -- Dizziness due to TIA  -- Dizziness due to orthostasis  -- Dizziness due to menieres disease  -- Dizziness due to benign paroxysmal vertigo  -- Dizziness due to, please document.  -- Other - I will add my own diagnosis  -- Disagree - Not applicable / Not valid  -- Disagree - Clinically unable to determine / Unknown  -- Refer to Clinical Documentation Reviewer    PROVIDER RESPONSE TEXT:    This patient has dizziness due to orthostasis.    Query created by: Carol Valencia on 2023 12:14 PM      Electronically signed by:  Nadiya Hernandez MD 2024 11:57 AM

## 2024-01-08 NOTE — ED NOTES
"  BMT Daily Progress Note  Patient ID:  Irma Foreman is a 54 year old female with a PMH of MDS, warm AIHA, transfusion and transfusion dependent anemia presented to  to undergo a myeloablative allogeneic transplant. She was recently COVID+ on 12/26 but tested negative on 1/2 with resolution of nearly all URI symptoms. Undergoing MA (Bu/Flu) 6/8 URD Allo transplant, day -3      HPI Ms Foreman is doing generally well. Line site pain is decreasing. She is eating and drinking without issue. No N/V/D.     Review of Systems    Negative unless stated in the HPI.     PHYSICAL EXAM      Weight     Wt Readings from Last 3 Encounters:   01/08/24 83.4 kg (183 lb 14.4 oz)   12/26/23 83.1 kg (183 lb 4.8 oz)   12/19/23 83.5 kg (184 lb)        KPS: 80    /82 (BP Location: Right arm)   Pulse 72   Temp 97.2  F (36.2  C) (Oral)   Resp 16   Ht 1.575 m (5' 2.01\")   Wt 83.4 kg (183 lb 14.4 oz)   LMP 11/05/2017   SpO2 99%   BMI 33.63 kg/m       General: NAD   Eyes: KEVON, sclera anicteric   Nose/Mouth/Throat: OP clear, buccal mucosa moist, no ulcerations   Lungs: CTA bilaterally  Cardiovascular: RRR, no M/R/G   Abdominal/Rectal: +BS, soft, NT, ND  Lymphatics: No edema  Skin: wart under right foot dime sized white, cauliflower and slightly ulcerated. Two pin point sized, flesh colored warts on right hand. Hands dry, one excoriation on left dorsum, palms slightly hyperpigmented  Neuro: A&O   Additional Findings: Gastelum site NT, no drainage.    Current aGVHD staging:  Skin 0, UGI 0, LGI 0, Liver 0 (keep in note through day +180 for allos)      LABS AND IMAGING: I have assessed all abnormal lab values for their clinical significance and any values considered clinically significant have been addressed in the assessment and plan.        Lab Results   Component Value Date    WBC 5.7 01/08/2024    ANEUTAUTO 5.4 01/08/2024    HGB 7.8 (L) 01/08/2024    HCT 24.3 (L) 01/08/2024     01/08/2024     01/08/2024    POTASSIUM " During ambulating patient to the bedside commode, pt was Short of breath, and pulse oximeter after toileting was 88% on 2L NC. Nurse Angela Rodriguez aware, ambulating with pulse oximetry was not done due to low SpO2 values during toileting.      Mercy Guerrero  01/04/23 4008 4.0 01/08/2024    CHLORIDE 107 01/08/2024    CO2 24 01/08/2024     (H) 01/08/2024    BUN 13.8 01/08/2024    CR 0.53 01/08/2024    MAG 1.9 01/08/2024    INR 1.05 01/08/2024           SYSTEMS-BASED ASSESSMENT AND PLAN     Hortencia Baldwin is a 54 year old female with a history of MDS, undergoing MA (Bu/Flu) 6/8 URD Allo transplant, day -3      BMT/IEC PROTOCOL for MT 2015-29 **according to but not on trial**   - Chemo protocol:  Day -6 through day -1: Keppra and Allopurinol   Day -5 through -2: Bu/Flu. Busulfan levels adjusted by pharm, attending  Day -1: Rest day   Day 0: Transplant. Recipient: O+, CMV+. Donor: O+, CMV-   - Restaging plan: per protocol       HEME/COAG  # Iron overload: hx of transfusion dependent anemia, pre-transplant ferritin around 5,000. Recommend phlebotomy post transplant when retic count is restored and Hgb >10    - Risk of cytopenias due to chemotherapy and radiation  - Transfusion parameters: hemoglobin <7, platelets <10    IMMUNOCOMPROMISED  - Relevant infection history:  URI from COVID (12/26), negative test on 1/2/24   - Vaccination status: Influenza vaccination after day +60, COVID vaccination after day +100, followed by remaining vaccinations at 12, 14, 24, and 26 months.  - Prophylaxis plan: ACV/letermovir, Micafungin through day +45 (current smoker), levofloxacin while neutropenic, Bactrim to start at day +28  Foot, hand warts. Derm consulted 1/8.     RISK OF GVHD  - Prophylaxis: PT Cy, Tac/MMF **avoiding sirolimus d/t high risk VOD**    CARDIOVASCULAR  - Risk of cardiomyopathy:  Baseline EF 60-65%  - Risk of arrhythmia: Baseline EKG showed sinus rhythm     RESPIRATORY  - Current smoker: states she quit today (1/5/24), offered nicotine replacement but she declined.   - Baseline PFTs: The FEV1, FVC, FEV1/FVC ratio and NJA79-74% are within normal limits.  The inspiratory flow rates are within normal limits.  Lung volumes are within normal limits.  The diffusing capacity is normal.    - Risk of respiratory complications: Frequent ambulation and incentive spirometer.    GI/NUTRITION  # Elevated liver enzymes: down trending ALT: 88 (117), AST: 43 (49) could be related to Tylenol use, iron overload or recent viral illness   RUQ U/S (12/27) was normal  Acute hepatitis panel negative   - Ulcer prophylaxis: PPI  - VOD ppx: Ursodiol   - Risk of malnutrition: dietitian to follow     RENAL/ELECTROLYTES/  - Risk of renal injury: IV hydration   - Electrolyte management: replace per sliding scale    DERM:  Right hand dryness, try aquafor with gloves on overnight and as tolerated during the day  Wart on foot, one on hand, see ID    DIABETES/ENDOCRINE  - Risk of steroid-induced hyperglyemia: Monitor BG, sliding scale if needed    MUSCULOSKELETAL/FRAILTY  - Baseline Frailty Score: 3  - Patient with substantial risk of sarcopenia  - Daily PT/OT as needed while inpatient  - Cancer Rehab as needed outpatient    SYMPTOM MANAGEMENT  - Nausea from chemo/radiation: Prochlorperazine, ondansetron, lorazepam.  - # Pain Assessment:      1/8/2024     7:45 AM   Current Pain Score   Patient currently in pain? denies   - Hortencia Sethi is experiencing pain due to bone marrow biopsy and line placement. Pain management was discussed and the plan was created in a collaborative fashion.  Hortencia Sethi's response to the current recommendations: engaged  - Please see the plan for pain management as documented above      SOCIAL DETERMINANTS  - Caregiver: Keira Cardenas & Rickey Neal     Disposition: remain admitted through engraftment     Known issues that I take into account for medical decisions, with salient changes to the plan considering these complexities noted above.    Patient Active Problem List   Diagnosis    Left medial knee pain    Obesity (BMI 30-39.9)    Anemia, unspecified type    Macrocytic anemia    MDS (myelodysplastic syndrome) (H)     Clinically Significant Risk Factors                         # Obesity: Estimated  "body mass index is 33.63 kg/m  as calculated from the following:    Height as of this encounter: 1.575 m (5' 2.01\").    Weight as of this encounter: 83.4 kg (183 lb 14.4 oz)., PRESENT ON ADMISSION               Today's summary: remain admitted through chemo prep, transplant and engraftment    I spent 30 minutes in the care of this patient today, which included time necessary for preparation for the visit, obtaining history, ordering medications/tests/procedures as medically indicated, review of pertinent medical literature, counseling of the patient, communication of recommendations to the care team, and documentation time.    King Vaz PA-C  [phone]      "

## 2024-01-08 NOTE — CARE COORDINATION
CM update note - patient is now agreeable to SNF. Spoke with son, who was at beside with patient. Preference is [facility], where she has been a patient prior.   Spoke with liaison (Bre) and placed referral. She will review and CB.    ADDENDUM: CB from Bre ([facility] liaison). Discussion regarding when Covid was actually diagnose. Admission was 1/2. After further research we found that she had symptoms for \"several days PTA. After discussion with admission team, they have agreed to accept her tomorrow. Patient and family updated.      Mary Myers RN     Mental status alteration

## 2024-01-08 NOTE — PLAN OF CARE
Problem: Discharge Planning  Goal: Discharge to home or other facility with appropriate resources  1/7/2024 2150 by Susan Canada RN  Outcome: Progressing  1/7/2024 0823 by Claritza Benites RN  Outcome: Progressing  Flowsheets (Taken 1/7/2024 0752)  Discharge to home or other facility with appropriate resources:   Arrange for needed discharge resources and transportation as appropriate   Identify barriers to discharge with patient and caregiver     Problem: Safety - Adult  Goal: Free from fall injury  1/7/2024 2150 by Susan Canada RN  Outcome: Progressing  1/7/2024 0823 by Claritza Benites RN  Outcome: Progressing     Problem: Chronic Conditions and Co-morbidities  Goal: Patient's chronic conditions and co-morbidity symptoms are monitored and maintained or improved  1/7/2024 2150 by Susan Canada RN  Outcome: Progressing  1/7/2024 0823 by Claritza Benites RN  Outcome: Progressing     Problem: Neurosensory - Adult  Goal: Achieves stable or improved neurological status  1/7/2024 2150 by Susan Canada RN  Outcome: Progressing  1/7/2024 0823 by Claritza Benites RN  Outcome: Progressing     Problem: Respiratory - Adult  Goal: Achieves optimal ventilation and oxygenation  1/7/2024 2150 by Susan Canada RN  Outcome: Progressing  1/7/2024 0823 by Claritza Benites RN  Outcome: Progressing     Problem: Cardiovascular - Adult  Goal: Maintains optimal cardiac output and hemodynamic stability  1/7/2024 2150 by Susan Canada RN  Outcome: Progressing  1/7/2024 0823 by Claritza Benites RN  Outcome: Progressing     Problem: Skin/Tissue Integrity - Adult  Goal: Skin integrity remains intact  1/7/2024 2150 by Susan Canada RN  Outcome: Progressing  1/7/2024 0823 by Claritza Benites RN  Outcome: Progressing  Flowsheets  Taken 1/7/2024 0752 by Claritza Benites RN  Skin Integrity Remains Intact:   Monitor for areas of redness and/or skin breakdown   Assess vascular access sites hourly  Taken 1/6/2024 2236 by Susan Canada RN  Skin Integrity

## 2024-01-08 NOTE — DISCHARGE INSTR - COC
Continuity of Care Form    Patient Name: Terri Smith   :  1940  MRN:  [id number]    Admit date:  2024  Discharge date:  2024    Code Status Order: Full Code   Advance Directives:     Admitting Physician:  No admitting provider for patient encounter.  PCP: Jackson Ontiveros MD    Discharging Nurse: [facility] Unit/Room#: 0426/0426-01  Discharging Unit Phone Number: [phone]    Emergency Contact:   Extended Emergency Contact Information  Primary Emergency Contact: Juan Smith   [facility]  Home Phone: [phone]  Relation: Child  Secondary Emergency Contact: Hafsa Tapia  Home Phone: [phone]  Mobile Phone: [phone]  Relation: Niece/Nephew    Past Surgical History:  Past Surgical History:   Procedure Laterality Date    BRONCHOSCOPY  2019    Dr. Wheatley - w/BAL    CARDIAC CATHETERIZATION  2019    Dr. Palomares     SECTION      COLONOSCOPY N/A 2020    COLONOSCOPY DIAGNOSTIC performed by Rowdy Schmitz DO at [facility] ENDOSCOPY    COLONOSCOPY N/A 10/22/2021    COLONOSCOPY POLYPECTOMY SNARE/COLD BIOPSY [facility]    COLONOSCOPY N/A 2023    COLONOSCOPY POLYPECTOMY SNARE performed by Darron Langford MD at [facility] ENDOSCOPY    CORONARY ARTERY BYPASS GRAFT N/A 2019    OFF PUMP CORONARY ARTERY BYPASS GRAFTING X2, INTERNAL MAMMARY ARTERY, SAPHENOUS VEIN GRAFT performed by Yolanda Chase MD at [facility] CVOR    IR MIDLINE CATH  2021    [place] MIDLINE C[place] [facility]    MASTECTOMY, PARTIAL Left     SIGMOIDOSCOPY  2020    4 bands    SIGMOIDOSCOPY N/A 2020    FLEX SIG W/ BANDING SIGMOIDOSCOPY DIAGNOSTIC FLEXIBLE performed by Rowdy Schmitz DO at [facility] ENDOSCOPY    SIGMOIDOSCOPY N/A 2023    FLEX SIG. performed [facility]    UPPER GASTROINTESTINAL ENDOSCOPY N/A 2023    EGD

## 2024-01-09 VITALS
TEMPERATURE: 97.9 F | RESPIRATION RATE: 18 BRPM | DIASTOLIC BLOOD PRESSURE: 72 MMHG | HEART RATE: 80 BPM | BODY MASS INDEX: 17.6 KG/M2 | SYSTOLIC BLOOD PRESSURE: 120 MMHG | WEIGHT: 103.1 LBS | OXYGEN SATURATION: 95 % | HEIGHT: 64 IN

## 2024-01-09 LAB
BASOPHILS # BLD: 0 K/UL (ref 0–0.2)
BASOPHILS NFR BLD: 0.1 %
DEPRECATED RDW RBC AUTO: 14.6 % (ref 12.4–15.4)
EOSINOPHIL # BLD: 0 K/UL (ref 0–0.6)
EOSINOPHIL NFR BLD: 0.5 %
GI PATHOGENS PNL STL NAA+PROBE: NORMAL
GLUCOSE BLD-MCNC: 113 MG/DL (ref 70–99)
GLUCOSE BLD-MCNC: 82 MG/DL (ref 70–99)
HCT VFR BLD AUTO: 32 % (ref 36–48)
HGB BLD-MCNC: 10.4 G/DL (ref 12–16)
LYMPHOCYTES # BLD: 1.5 K/UL (ref 1–5.1)
LYMPHOCYTES NFR BLD: 19.8 %
MCH RBC QN AUTO: 29.4 PG (ref 26–34)
MCHC RBC AUTO-ENTMCNC: 32.6 G/DL (ref 31–36)
MCV RBC AUTO: 90.3 FL (ref 80–100)
MONOCYTES # BLD: 0.3 K/UL (ref 0–1.3)
MONOCYTES NFR BLD: 4.5 %
NEUTROPHILS # BLD: 5.8 K/UL (ref 1.7–7.7)
NEUTROPHILS NFR BLD: 75.1 %
PERFORMED ON: ABNORMAL
PERFORMED ON: NORMAL
PLATELET # BLD AUTO: 274 K/UL (ref 135–450)
PMV BLD AUTO: 8.5 FL (ref 5–10.5)
RBC # BLD AUTO: 3.55 M/UL (ref 4–5.2)
WBC # BLD AUTO: 7.7 K/UL (ref 4–11)

## 2024-01-09 PROCEDURE — 36415 COLL VENOUS BLD VENIPUNCTURE: CPT

## 2024-01-09 PROCEDURE — 6370000000 HC RX 637 (ALT 250 FOR IP): Performed by: INTERNAL MEDICINE

## 2024-01-09 PROCEDURE — 97110 THERAPEUTIC EXERCISES: CPT

## 2024-01-09 PROCEDURE — 97535 SELF CARE MNGMENT TRAINING: CPT

## 2024-01-09 PROCEDURE — 6370000000 HC RX 637 (ALT 250 FOR IP): Performed by: NURSE PRACTITIONER

## 2024-01-09 PROCEDURE — 85025 COMPLETE CBC W/AUTO DIFF WBC: CPT

## 2024-01-09 PROCEDURE — 2580000003 HC RX 258: Performed by: INTERNAL MEDICINE

## 2024-01-09 PROCEDURE — 6360000002 HC RX W HCPCS: Performed by: STUDENT IN AN ORGANIZED HEALTH CARE EDUCATION/TRAINING PROGRAM

## 2024-01-09 RX ADMIN — SALINE NASAL SPRAY 1 SPRAY: 1.5 SOLUTION NASAL at 06:19

## 2024-01-09 RX ADMIN — SERTRALINE HYDROCHLORIDE 50 MG: 50 TABLET ORAL at 09:42

## 2024-01-09 RX ADMIN — ASPIRIN 81 MG: 81 TABLET, CHEWABLE ORAL at 09:42

## 2024-01-09 RX ADMIN — PANTOPRAZOLE SODIUM 40 MG: 40 TABLET, DELAYED RELEASE ORAL at 09:42

## 2024-01-09 RX ADMIN — METOPROLOL TARTRATE 25 MG: 25 TABLET, FILM COATED ORAL at 09:42

## 2024-01-09 RX ADMIN — BUSPIRONE HYDROCHLORIDE 5 MG: 5 TABLET ORAL at 13:16

## 2024-01-09 RX ADMIN — DEXAMETHASONE 6 MG: 4 TABLET ORAL at 09:42

## 2024-01-09 RX ADMIN — GUAIFENESIN 600 MG: 600 TABLET ORAL at 09:42

## 2024-01-09 RX ADMIN — TORSEMIDE 10 MG: 20 TABLET ORAL at 09:42

## 2024-01-09 RX ADMIN — AMLODIPINE BESYLATE 10 MG: 5 TABLET ORAL at 09:42

## 2024-01-09 RX ADMIN — HYDROCORTISONE ACETATE 25 MG: 25 SUPPOSITORY RECTAL at 09:43

## 2024-01-09 RX ADMIN — PHENOL 1 SPRAY: 1.5 LIQUID ORAL at 04:52

## 2024-01-09 RX ADMIN — SODIUM CHLORIDE, PRESERVATIVE FREE 10 ML: 5 INJECTION INTRAVENOUS at 09:43

## 2024-01-09 NOTE — CARE COORDINATION
CASE MANAGEMENT DISCHARGE SUMMARY      Discharge to: [facility] SNF    Precertification completed:   Hospital Exemption Notification (HENS) completed: Document ID : [id number]     IMM given: NA      Transportation:       Medical Transport explained to pt/family. Pt/family voice no agency preference.    Agency used:[facility]   time:1300   Ambulance form completed: Yes    Confirmed discharge plan with:     Patient: yes     Family:  yes, daughter     Facility/Agency, name:  [facility] to obtain from [facility]        RN, name: Esperanza    Note: Discharging nurse to complete [facility], reconcile AVS, and place final copy with patient's discharge packet. RN to ensure that written prescriptions for  Level II medications are sent with patient to the facility as per protocol.

## 2024-01-09 NOTE — PROGRESS NOTES
Infectious Disease Follow up Notes    CC :   COVID-19      Antibiotics:  Azithromycin 500 IV q24  Decadron 6mg po daily      Admit Date:   1/2/2024  Hospital Day: 3    Subjective:   She remains AF on 2/5L NC which is baseline   No acute changes noted     Objective:     Patient Vitals for the past 8 hrs:   BP Temp Temp src Pulse Resp SpO2   01/04/24 1134 (!) 105/58 98.1 °F (36.7 °C) Oral 88 18 100 %   01/04/24 0901 114/60 -- -- (!) 101 -- --   01/04/24 0830 114/60 97.6 °F (36.4 °C) Oral (!) 101 21 97 %   01/04/24 0734 -- -- -- 86 -- 95 %   01/04/24 0729 -- -- -- (!) 103 20 95 %       EXAM:  Alert, NAD   Non-labored breathing   Exposed skin without rash         LINE:   PIV in place         Scheduled Meds:   aspirin  81 mg Oral Daily    atorvastatin  40 mg Oral Nightly    guaiFENesin  600 mg Oral BID    melatonin  5 mg Oral Nightly    metoprolol tartrate  25 mg Oral BID    sertraline  50 mg Oral Daily    pantoprazole  40 mg Oral Daily    sodium chloride flush  10 mL IntraVENous 2 times per day    apixaban  2.5 mg Oral BID    azithromycin  500 mg IntraVENous Q24H    mometasone-formoterol  2 puff Inhalation BID RT    tiotropium  2 puff Inhalation Daily RT    dexAMETHasone  6 mg Oral Daily    insulin lispro  0-8 Units SubCUTAneous TID WC    insulin lispro  0-4 Units SubCUTAneous Nightly       Continuous Infusions:   sodium chloride      dextrose            Data Review:    Lab Results   Component Value Date    WBC 11.9 (H) 01/04/2024    HGB 10.5 (L) 01/04/2024    HCT 31.7 (L) 01/04/2024    MCV 91.2 01/04/2024     01/04/2024     Lab Results   Component Value Date    CREATININE 1.7 (H) 01/04/2024    BUN 62 (H) 01/04/2024     01/04/2024    K 3.7 01/04/2024    CL 98 (L) 01/04/2024    CO2 30 01/04/2024       Hepatic Function Panel:   Lab Results   Component Value Date/Time    ALKPHOS 78 01/04/2024 05:02 AM    ALT 7 01/04/2024 05:02 AM 
                                                      PROGRESS NOTE    HPI: Terri Smith is a(n)83 y.o. female admitted for work-up and treatment for Shortness of breath [R06.02]  Generalized weakness [R53.1]  Chronic hypoxemic respiratory failure (HCC) [J96.11]  NADJA (acute kidney injury) (HCC) [N17.9]  Diarrhea, unspecified type [R19.7]  COVID-19 virus infection [U07.1].     We are following for rectal bleeding.    Subjective:     No rectal bleeding reported since yesterday, and only had small streaks of blood in her stool at this time.      Objective:     I/O last 3 completed shifts:  In: 2040 [P.O.:2040]  Out: 1500 [Urine:1500]      /76   Pulse 82   Temp 97.8 °F (36.6 °C) (Oral)   Resp 20   Ht 1.626 m (5' 4\")   Wt 42.8 kg (94 lb 5.7 oz)   SpO2 97%   BMI 16.20 kg/m²     Physical Exam:  HEENT: anicteric sclera, oropharyngeal membranes pink and moist.  Cor: RRR  Lungs: non-labored, no respiratory distress, 2 L O2  Abdomen: soft, NT. No ascites.  No hepatomegaly or splenomegaly  Extremities: no edema  Neuro: alert and oriented x 3    Results:   Lab Results   Component Value Date    ALT 26 01/06/2024    AST 31 01/06/2024    ALKPHOS 78 01/06/2024    BILIDIR <0.2 09/19/2019    PROT 6.5 01/06/2024    LABALBU 3.2 (L) 01/07/2024    INR 1.27 (H) 08/31/2023    LIPASE 99.0 (H) 07/05/2023     Lab Results   Component Value Date    WBC 6.8 01/08/2024    HGB 10.6 (L) 01/08/2024    HCT 32.4 (L) 01/08/2024    MCV 91.0 01/08/2024     01/08/2024     BUN/Cr/glu/ALT/AST/amyl/lip:  51/0.9/--/--/--/--/-- (01/07 0933)  XR CHEST PORTABLE    Result Date: 1/2/2024  Resolving pulmonary interstitial edema. Chronic obstructive lung changes with probable underlying chronic interstitial lung disease which is less prominent with no obvious infiltrate or effusion..         Impression:  83 year old female with past medical history of HTN, COPD w/ chronic O2 dependence (2-3L via NC), CAD (s/p CABG 9/2019), CHF, CKD3, DM2, 
    [facility]  Nephrology Follow up Note           Reason for Consult: NADJA on CKD III  Requesting Physician:  Dr. Kerr    Sub/interval history  C/o cough  Appetite some what better  Low grade fever     Last 24 h uop 800 ml charted    ROS: No chest pain/nausea/vomiting  PSFH: + visitor    Scheduled Meds:   aspirin  81 mg Oral Daily    atorvastatin  40 mg Oral Nightly    guaiFENesin  600 mg Oral BID    melatonin  5 mg Oral Nightly    metoprolol tartrate  25 mg Oral BID    sertraline  50 mg Oral Daily    pantoprazole  40 mg Oral Daily    sodium chloride flush  10 mL IntraVENous 2 times per day    apixaban  2.5 mg Oral BID    azithromycin  500 mg IntraVENous Q24H    mometasone-formoterol  2 puff Inhalation BID RT    tiotropium  2 puff Inhalation Daily RT    dexAMETHasone  6 mg Oral Daily    insulin lispro  0-8 Units SubCUTAneous TID WC    insulin lispro  0-4 Units SubCUTAneous Nightly     Continuous Infusions:   sodium chloride      dextrose       PRN Meds:.meclizine, busPIRone, sodium chloride flush, sodium chloride, ondansetron **OR** ondansetron, senna, acetaminophen **OR** acetaminophen, albuterol sulfate HFA, glucose, dextrose bolus **OR** dextrose bolus, glucagon (rDNA), dextrose    History of Present Illness on 1/4/2024:    83 y.o. yo female with PMH of CAD S/P CABG, CHF with reduced EF, COPD, GERD, PAF and HLD, chronic respiratory failure with 2.5 L/min of oxygen at home, chronic kidney disease stage III with baseline creatinine of low to mid ones, followed by Dr. Moscoso who is admitted for generalized weakness, dyspnea and diarrhea with COVID  Patient creatinine increased to 1.7 and nephrology has been consulted  Patient was at [facility] between 12/15-12/22 for dizziness and and was diagnosed to have possible orthostasis along with Ménière's disease and was started on as needed meclizine.  During that admission, due to history of GI bleed and anemia on Xarelto was stopped  Patient reports 
    V2.0    [facility] [place] Progress Note      Name:  Terri Smith /Age/Sex: 1940  (83 y.o. female)   MRN & CSN:  [id number] & [id number] Encounter Date/Time: 2024 10:31 AM EST   Location:  [facility]01 PCP: Jackson Otniveros MD     Attending:Joyce Tesfaye MD       Hospital Day: 6    Assessment and Recommendations   Terri Smith is a 83 y.o. female  who presents with COVID-19 virus infection      Plan:     Covid infection in background of severe COPD with oxygen dependence (2-3L home O2)  -Oxygen requirement was 4 L.  Higher than her oxygen requirement and she is typically on 2 L at home as per patient oxygen requirement has not been trended down to 2.5 L.  Close to her baseline.  Continue to maintain maintaining 2, oxygen rate greater than 89%.  -Continue dexamethasone oral tablets.  Appreciate pulmonology recommendations.    Generalized weakness  -PT OT-initially evaluated and recommended SNF.  Patient wanted to go home at that time point.  -Now she has changed her mind and wants to go to SNF.    NDAJA on CKD3  -Creatinine peaked at 1.7 mg/DL.  Creatinine on presentation 1.5 mg/DL.  -Now improved to 1.1 mg/DL today.    Paroxysmal atrial fibrillation (currently NSR)  -Continue home Eliquis and beta-blocker.  -Eliquis on hold due to GI bleed    Lower GI bleed  -Had 2 episodes of large bloody bowel movements overnight and 2024  -Hemoglobin on presentation 12.1, today at 11 G/DL  -Previously had colonoscopy in 2023 which showed internal hemorrhoids  -Eliquis on hold.  GI consult has been placed.    Chronic systolic CHF, CAD status post CABG  -Continue home metoprolol.  Not in exacerbation.  Continue aspirin, statin.  Not on ACE and ARB's and Aldactone.     Diabetes, type 2, controlled  -Continue sliding scale insulin.  Hypoglycemia protocol.      Diet ADULT DIET; Regular  ADULT ORAL NUTRITION SUPPLEMENT; Breakfast, Dinner, Lunch; Standard High Calorie/High Protein Oral Supplement   DVT 
    V2.0    [facility] Progress Note      Name:  Terri Smith /Age/Sex: 1940  (83 y.o. female)   MRN & CSN:  [id number] & [id number] Encounter Date/Time: 2024 10:31 AM EST   Location:  [id number] PCP: Jackson Ontiveros MD     Attending:Joyce Tesfaye MD       Hospital Day: 4    Assessment and Recommendations   Terri Smith is a 83 y.o. female  who presents with COVID-19 virus infection      Plan:     Covid infection in background of severe COPD with oxygen dependence (2-3L home O2)  -Oxygen requirement was 4 L.  Higher than her oxygen requirement and she is typically on 2 L at home as per patient oxygen requirement has not been trended down to 2.5 L.  Close to her baseline.  Continue to maintain maintaining 2, oxygen rate greater than 89%.  -She has been started on IV corticosteroids and azithromycin.  Pulmonology has been consulted.    Generalized weakness  -PT OT    NADJA on CKD3  -BUN creatinine trending up.  Bladder scan was done which did not show any signs of obstruction.  We will obtain urine electrolytes panel.  We will give IV fluid challenge today and get nephrology consultation.  Hold home Demadex    Paroxysmal atrial fibrillation (currently NSR)  -Continue home Eliquis and beta-blocker.    Chronic systolic CHF, CAD status post CABG  -Continue home metoprolol.  Not in exacerbation.  Continue aspirin, statin.  Not on ACE and ARB's and Aldactone.     Diabetes, type 2, controlled  -Continue sliding scale insulin.  Hypoglycemia protocol.      Diet ADULT DIET; Regular  ADULT ORAL NUTRITION SUPPLEMENT; Breakfast, Dinner, Lunch; Standard High Calorie/High Protein Oral Supplement   DVT Prophylaxis [] Lovenox, []  Heparin, [] SCDs, [] Ambulation,  [x] Eliquis, [] Xarelto  [] Coumadin   Code Status Full Code   Disposition F  Expected Disposition: Home health care versus SNF  Estimated Date of Discharge: 2 days  Patient requires continued admission due to COVID-19 pneumonia   Surrogate 
  Physician Progress Note      PATIENT:               SHARON HANEY  CSN #:                  [id number]  :                       1940  ADMIT DATE:       2024 9:47 PM  DISCH DATE:  RESPONDING  PROVIDER #:        Tadeo Kerr MD          QUERY TEXT:    Patient admitted with Covid.   Noted documentation of Covid pneumonia in PN    and\" currently at baseline without evidence of pneumonia or worsening   hypoxemia\" by ID .  If possible, please document in progress notes and discharge summary if you   are evaluating and /or treating any of the following:    The medical record reflects the following:  Risk Factors: Covid, chronic respiratory failure, COPD, CKD  Clinical Indicators:  ID -  \"COVID-19 infection  Date of diagnosis 24  Date of symptom onset unable to tell, likely several days prior to date of   diagnosis  Currently without new oxygen requirement.  CXR was free of acute infiltrate.  No fever since admission.  WBC is declining.  BC collected on admission are   negative to date  She was started on steroid on admission  -patient is at high risk of complication from COVID, yet currently at baseline   without evidence of pneumonia or worsening hypoxemia\"  PN -  \"Expected Disposition: Home health care versus SNF  Estimated Date of Discharge: 2 days  Patient requires continued admission due to COVID-19 pneumonia\"  Treatment: Azithromycin, steroids, oxygen, ID consult, serial labs and   supportive care    Thank you,  Arely Sun, RN, BSN, CRCR  Options provided:  -- Covid pneumonia confirmed  -- Covid pneumonia ruled out  -- Other - I will add my own diagnosis  -- Disagree - Not applicable / Not valid  -- Disagree - Clinically unable to determine / Unknown  -- Refer to Clinical Documentation Reviewer    PROVIDER RESPONSE TEXT:    After study, Covid pneumonia confirmed.    Query created by: Arely Sun on 2024 12:15 PM      Electronically signed by:  Tadeo Kerr MD 
  Physician Progress Note      PATIENT:               SHARON HANEY  CSN #:                  [id number]  :                       1940  ADMIT DATE:       2024 9:47 PM  DISCH DATE:  RESPONDING  PROVIDER #:        Benson Fish MD          QUERY TEXT:    Pt admitted with Covid and Covid PNA. Pt noted to have WBC 18.9, LA 4.2, HR   104, RR 20-34. If possible, please document in the progress notes and   discharge summary if you are evaluating and /or treating any of the   following:????    The medical record reflects the following:  Risk Factors: covid, PNA, respiratory failure, age, CKD, CHF, NADJA,  Clinical Indicators:  - WBC 18.9, , RR20-34  - WBC 14.1, HR   - WBC 11.9, HR   - LA 4.2, T 100.5 (oral)  Treatment: NS Bolus, solumedrol, zithromax, decadron, BCx, PCT, lactic, serial   labs, and supportive care    Thank you,  Arely Sun, RN, BSN, CRCR  Options provided:  -- Sepsis secondary to Covid, present on arrival  -- Covid without Sepsis  -- Other - I will add my own diagnosis  -- Disagree - Not applicable / Not valid  -- Disagree - Clinically unable to determine / Unknown  -- Refer to Clinical Documentation Reviewer    PROVIDER RESPONSE TEXT:    This patient has sepsis secondary to covid which was present on arrival.    Query created by: Arely Sun on 2024 7:26 AM      QUERY TEXT:    Patient admitted with Covid. Noted to have severe malnutrition documented by   RD . If possible, please document in progress notes and discharge summary   if you are evaluating and /or treating any of the following:      The medical record reflects the following:  Risk Factors: CKD, CHF, COPD, age, sex  Clinical Indicators: BMI 16  -  \"Malnutrition Status:  Severe malnutrition (LINDA NFPE d/t droplet plus   precautions) (24 0954)  Context:  Chronic Illness  Findings of the 6 clinical characteristics of malnutrition:  Energy Intake:  75% or less estimated 
  [facility] Progress Note           Subjective/   83 y.o. year old female who we are seeing in consultation for NADJA/CKD-3.     HPI:  Renal function better off diuretics, non-oliguric.  Shortness of breath better, on 2-3 L NC which is home requirement.    ROS:  +weak, intake better.    Objective/   GEN:  BP (!) 152/79   Pulse 87   Temp 97.9 °F (36.6 °C) (Oral)   Resp 22   Ht 1.626 m (5' 4\")   Wt 42.8 kg (94 lb 5.7 oz)   SpO2 98%   BMI 16.20 kg/m²   Gen: alert, awake  Neck: No JVD  Skin: Unremarkable  Cardiovascular:  S1, S2 without m/r/g   Respiratory: Coarse breath sounds  Abdomen:  soft, nt, nd,   Extremities: no lower extremity edema  Neuro/Psy: AAoriented times 3 ; moves all 4 ext    Data/  Recent Labs     01/07/24  0933 01/08/24  0442   WBC 7.6 6.8   HGB 11.0* 10.6*   HCT 34.0* 32.4*   MCV 91.5 91.0    215     Recent Labs     01/06/24  0451 01/07/24  0933    142   K 4.4 5.2*    102   CO2 34* 31   GLUCOSE 95 93   PHOS  --  1.9*   BUN 55* 51*   CREATININE 1.1 0.9   LABGLOM 50* >60       Assessment/     -NADJA on CKD III likely related to multiple factors including decreased p.o. intake, continued use of diuretics and diarrhea                Baseline creatinine is around low to mid ones.  Follows with Dr. Moscoso in office    -Acute on chronic respiratory failure in the setting of COVID in this patient with severe COPD and CHF                Patient usually is on 2.5 L/min of oxygen at home at baseline    -CHF with reduced EF    -Hospitalization in December 2023 due to dizziness and started on meclizine    -Atrial fibrillation    Plan/     -Renal function stable, continue to monitor  -Restart home torsemide dose at a low dose of 10 mg p.o. once daily  - Trend labs, bp's, weights, & urine output    ____________________________________  Kayla Multani MD  [facility]  [email or web address]  Office: [phone]  
  [facility] Progress Note           Subjective/   83 y.o. year old female who we are seeing in consultation for NADJA/CKD-3.     HPI:  Renal function better off diuretics, non-oliguric.  Shortness of breath better, on 2-3 L NC which is home requirement.    ROS:  +weak, intake better.    Objective/   GEN:  BP (!) 168/81   Pulse 67   Temp 97.9 °F (36.6 °C) (Oral)   Resp 20   Ht 1.626 m (5' 4\")   Wt 42.8 kg (94 lb 5.7 oz)   SpO2 99%   BMI 16.20 kg/m²   Exam deferred as means to protect PPE due to shortage & due to CORONAVIRUS risk.    Data/  Recent Labs     01/05/24  0451 01/07/24  0933   WBC 8.1 7.6   HGB 10.6* 11.0*   HCT 32.2* 34.0*   MCV 91.9 91.5    196       Recent Labs     01/05/24  0450 01/06/24  0451    145   K 4.1 4.4   CL 98* 102   CO2 34* 34*   GLUCOSE 115* 95   BUN 60* 55*   CREATININE 1.5* 1.1   LABGLOM 34* 50*         Assessment/     -NADJA on CKD III likely related to multiple factors including decreased p.o. intake, continued use of diuretics and diarrhea                Baseline creatinine is around low to mid ones.  Follows with Dr. Moscoso in office    -Acute on chronic respiratory failure in the setting of COVID in this patient with severe COPD and CHF                Patient usually is on 2.5 L/min of oxygen at home at baseline    -CHF with reduced EF    -Hospitalization in December 2023 due to dizziness and started on meclizine    -Atrial fibrillation    Plan/     - Torsemide on hold  - Trend labs, bp's, weights, & urine output     Case d/w Nursing    ____________________________________  Dmitri Naik MD  [facility]  [email or web address]  Office: [phone]    
  [facility] Progress Note           Subjective/   83 y.o. year old female who we are seeing in consultation for NADJA/CKD-3.     HPI:  Renal function better off diuretics, non-oliguric.  Shortness of breath better, on 2-3 L NC which is home requirement.    ROS:  +weak, intake reduced.    Objective/   GEN:  BP (!) 145/78   Pulse 66   Temp 98.1 °F (36.7 °C) (Oral)   Resp 23   Ht 1.626 m (5' 4\")   Wt 43.5 kg (95 lb 14.4 oz)   SpO2 97%   BMI 16.46 kg/m²   Exam deferred as means to protect PPE due to shortage & due to CORONAVIRUS risk.    Data/  Recent Labs     01/04/24  0502 01/05/24  0451   WBC 11.9* 8.1   HGB 10.5* 10.6*   HCT 31.7* 32.2*   MCV 91.2 91.9    162     Recent Labs     01/04/24  0502 01/05/24  0450 01/06/24  0451    142 145   K 3.7 4.1 4.4   CL 98* 98* 102   CO2 30 34* 34*   GLUCOSE 121* 115* 95   BUN 62* 60* 55*   CREATININE 1.7* 1.5* 1.1   LABGLOM 30* 34* 50*       Assessment/     -NADJA on CKD III likely related to multiple factors including decreased p.o. intake, continued use of diuretics and diarrhea                Baseline creatinine is around low to mid ones.  Follows with Dr. Moscoso in office    -Acute on chronic respiratory failure in the setting of COVID in this patient with severe COPD and CHF                Patient usually is on 2.5 L/min of oxygen at home at baseline    -CHF with reduced EF    -Hospitalization in December 2023 due to dizziness and started on meclizine    -Atrial fibrillation    Plan/     - Torsemide on hold  - Trend labs, bp's, weights, & urine output     Case d/w Nursing    ____________________________________  Dmitri Naik MD  [facility]  [email or web address]  Office: [phone]    
  [facility] Progress Note           Subjective/   83 y.o. year old female who we are seeing in consultation for NADJA/CKD-3.     HPI:  Vitals are stable.    ROS:  +weak, intake better.    Objective/   GEN:  /76   Pulse 71   Temp 97.8 °F (36.6 °C) (Oral)   Resp 18   Ht 1.626 m (5' 4\")   Wt 46.8 kg (103 lb 1.6 oz)   SpO2 100%   BMI 17.70 kg/m²   Gen: alert, awake  Neck: No JVD  Skin: Unremarkable  Cardiovascular:  S1, S2 without m/r/g   Respiratory: Coarse breath sounds  Abdomen:  soft, nt, nd,   Extremities: no lower extremity edema  Neuro/Psy: AAoriented times 3 ; moves all 4 ext    Data/  Recent Labs     01/07/24  0933 01/08/24  0442 01/09/24  0638   WBC 7.6 6.8 7.7   HGB 11.0* 10.6* 10.4*   HCT 34.0* 32.4* 32.0*   MCV 91.5 91.0 90.3    215 274     Recent Labs     01/07/24  0933      K 5.2*      CO2 31   GLUCOSE 93   PHOS 1.9*   BUN 51*   CREATININE 0.9   LABGLOM >60       Assessment/     -NADJA on CKD III likely related to multiple factors including decreased p.o. intake, continued use of diuretics and diarrhea                Baseline creatinine is around low to mid ones.  Follows with Dr. Moscoso in office    -Acute on chronic respiratory failure in the setting of COVID in this patient with severe COPD and CHF                Patient usually is on 2.5 L/min of oxygen at home at baseline    -CHF with reduced EF    -Hospitalization in December 2023 due to dizziness and started on meclizine    -Atrial fibrillation    Plan/     -Renal function stable, continue to monitor  -Continue home torsemide dose at a low dose of 10 mg p.o. once daily  - Trend labs, bp's, weights, & urine output  Outpatient nephrology follow-up 2 to 4 weeks after discharge    ____________________________________  Kayla Multani MD  [facility]  [email or web address]  Office: [phone]  
4 Eyes Skin Assessment     NAME:  Terri Smith  YOB: 1940  MEDICAL RECORD NUMBER:  [id number]    The patient is being assessed for  Admission    I agree that at least one RN has performed a thorough Head to Toe Skin Assessment on the patient. ALL assessment sites listed below have been assessed.      Areas assessed by both nurses:    Head, Face, Ears, Shoulders, Back, Chest, Arms, Elbows, Hands, Sacrum. Buttock, Coccyx, Ischium, Legs. Feet and Heels, and Under Medical Devices         Does the Patient have a Wound? No noted wound(s)       Trey Prevention initiated by RN: Yes  Wound Care Orders initiated by RN: No    Pressure Injury (Stage 3,4, Unstageable, DTI, NWPT, and Complex wounds) if present, place Wound referral order by RN under : No    New Ostomies, if present place, Ostomy referral order under : No     Nurse 1 eSignature: Electronically signed by Eugenia Ortez RN on 1/3/24 at 2:41 AM EST    **SHARE this note so that the co-signing nurse can place an eSignature**    Nurse 2 eSignature: Electronically signed by Abdoulaye Maciel RN on 1/3/24 at 3:35 AM EST   
Attempted to wean O2 to room air. Patient complained of SOB despite O2 saturation being on high 90s. Placed patient in 1l for comfort. Patient then able to rest comfortably.     Patient had large bloody bowel movement @ 0800.   
BP (!) 152/79   Pulse 87   Temp 97.9 °F (36.6 °C) (Oral)   Resp 22   Ht 1.626 m (5' 4\")   Wt 42.8 kg (94 lb 5.7 oz)   SpO2 99%   BMI 16.20 kg/m²  on 2L O2 per nasal cannula.  Pt resting quietly in bed, son at bedside.  Pt denies pain.  Pt with dyspnea at rest and with exertion.  Lungs diminished.  NSR on tele.  Pt encouraged to turn and reposition self frequently, explained importance.  Sacral heart in place on coccyx.  New external catheter with brief applied.  BLE elevated off bed onto pillow.  Pt denies any other needs at this time.  Bedside table, call light, fluids within reach.  Bed alarm activated on bed.  Will continue to monitor.  Jenifer Johnson RN  1/8/2024      
Comprehensive Nutrition Assessment    Type and Reason for Visit:  Initial, Positive Nutrition Screen    Nutrition Recommendations/Plan:   Continue regular diet and encourage po intakes as tolerated - monitor need for carb control  Initiate ensure BID - monitor for acceptance  Obtain updated weight  Monitor nutrition adequacy, pertinent labs, bowel habits, wt changes, and clinical progress     Malnutrition Assessment:  Malnutrition Status:  At risk for malnutrition (Comment) (01/03/24 7248)    Context:  Acute Illness     Findings of the 6 clinical characteristics of malnutrition:  Energy Intake:  Mild decrease in energy intake (Comment)  Weight Loss:  Greater than 5% over 1 month       Nutrition Assessment:    +IP for possible weight loss and decreased appetite: 82 yo F w pmh CAD, CHF, CKD3, COPD, DM, GERD, HLD, HTN p/w weakness, dyspnea, diarrhea, found to be COVID positive. In isolation. On regular diet with no po intakes recorded yet per EMR. Unable to reach pt via phone. Spoke with nurse, reports pt did not eat breakfast and was eating some of lunch. Pt with significant weight loss in past month per EMR (-11% BW), current weight stated per EMR, RD to order updated weight. Recommend adding ONS to promote po intakes. Will monitor.    Nutrition Related Findings:    -176 x24 hrs. Active BS. +diarrhea. Wound Type: None       Current Nutrition Intake & Therapies:    Average Meal Intake: Unable to assess  Average Supplements Intake: None Ordered  ADULT DIET; Regular    Anthropometric Measures:  Height: 162.6 cm (5' 4\")  Ideal Body Weight (IBW): 120 lbs (55 kg)       Current Body Weight: 45.5 kg (100 lb 5 oz),   IBW. Weight Source: Stated  Current BMI (kg/m2): 17.2  Usual Body Weight: 51.1 kg (112 lb 10.5 oz) (12/15, [facility])  % Weight Change (Calculated): -11  Weight Adjustment For: No Adjustment                 BMI Categories: Underweight (BMI less than 22) age over 65    Estimated Daily Nutrient Needs:  Energy 
Comprehensive Nutrition Assessment    Type and Reason for Visit:  Reassess    Nutrition Recommendations/Plan:   Continue regular diet and encourage PO intake   Continue ensure w/ meals  Due to inadequate intake, discussion of palliative care vs nutrition support should be considered. Consult dietitian if tube feeding is desired and consistent with patient's plan of care.  Monitor nutrition adequacy, pertinent labs, bowel habits, wt changes, and clinical progress     Malnutrition Assessment:  Malnutrition Status:  Severe malnutrition (LINDA NFPE d/t droplet plus precautions) (01/08/24 0998)    Context:  Chronic Illness     Findings of the 6 clinical characteristics of malnutrition:  Energy Intake:  75% or less estimated energy requirements for 1 month or longer  Weight Loss:  Greater than 10% over 6 months       Nutrition Assessment:    Follow up: Pt continues on regular diet, PO intakes 1-100% of meals, mostly 1-76%. Continues in droplet plus precuations, attempted to call pt today w/ no answer. -14% wt change x 6 months in EMR. Pt w/ 2 episodes of large bloody bowel movements on 1/6/2024, GI following. Ensure added w/ meals. Continue to encourage PO intake, will continue to monitor.    Nutrition Related Findings:    + BM yesterday. K+ 5.2. phos 1.9. BG  x 24 hours. Wound Type: None       Current Nutrition Intake & Therapies:    Average Meal Intake: 1-25%, 51-75%, 26-50%  Average Supplements Intake: Unable to assess  ADULT DIET; Regular  ADULT ORAL NUTRITION SUPPLEMENT; Breakfast, Dinner, Lunch; Standard High Calorie/High Protein Oral Supplement    Anthropometric Measures:  Height: 162.6 cm (5' 4\")  Ideal Body Weight (IBW): 120 lbs (55 kg)       Current Body Weight: 42.6 kg (94 lb),   IBW. Weight Source: Bed Scale  Current BMI (kg/m2): 16.1  Usual Body Weight: 52.6 kg (116 lb) (On 7/6/23)  % Weight Change (Calculated): -13.8  Weight Adjustment For: No Adjustment                 BMI Categories: Underweight (BMI 
Comprehensive Nutrition Assessment    Type and Reason for Visit:  Reassess    Nutrition Recommendations/Plan:   Continue regular diet and encourage PO intake   Continue vanilla ensure w/ meals  If PO intake does not improve, may need to consider alternative methods of nutrition if in line w/ GOC.   Monitor nutrition adequacy, pertinent labs, bowel habits, wt changes, and clinical progress     Malnutrition Assessment:  Malnutrition Status:  At risk for malnutrition (Comment) (01/03/24 1308)    Context:  Acute Illness     Findings of the 6 clinical characteristics of malnutrition:  Energy Intake:  Mild decrease in energy intake (Comment)  Weight Loss:  Greater than 5% over 1 month       Nutrition Assessment:    Follow up: Continues in droplet plus precautions d/t COVID-19. Pt continues on regular diet. PO intakes 0%. Spoke to pt's family member on phone. Reports pt w/ poor intake and wt loss PTA. Wt continues to trend down. Reports fair acceptance of ensure, will continue. Likes vanilla. Continue to encourage PO intake, if intake does not improve, consider alternative methods of nutrition. Consult RD for recommendations. Will continue to monitor.    Nutrition Related Findings:    Generalized non-pitting edema. No BM recorded. Wound Type: None       Current Nutrition Intake & Therapies:    Average Meal Intake: 0%  Average Supplements Intake: None Ordered  ADULT DIET; Regular  ADULT ORAL NUTRITION SUPPLEMENT; Breakfast, Dinner, Lunch; Standard High Calorie/High Protein Oral Supplement    Anthropometric Measures:  Height: 162.6 cm (5' 4\")  Ideal Body Weight (IBW): 120 lbs (55 kg)       Current Body Weight: 45.4 kg (100 lb),   IBW. Weight Source: Standing Scale  Current BMI (kg/m2): 17.2  Usual Body Weight: 52.6 kg (116 lb) (On 7/6/23)  % Weight Change (Calculated): -13.8  Weight Adjustment For: No Adjustment                 BMI Categories: Underweight (BMI less than 22) age over 65    Estimated Daily Nutrient 
INPATIENT PULMONARY CRITICAL CARE PROGRESS NOTE      Reason for visit    severe COPD with COVID infection     SUBJECTIVE: Patient when evaluated this morning was comfortably sleeping in the bed without any profound shortness of breath or increased work of breathing, patient has had a Tmax of 100.5 °F, patient was on 2 L of nasal oxygen with saturation of 90%, patient was hemodynamically, patient blood sugars are not optimally controlled patient's urine output is adequate, and patient's diffuse breath sounds even no other pertinent review of system of concern         Physical Exam:  Blood pressure (!) 108/57, pulse 72, temperature 98.1 °F (36.7 °C), temperature source Axillary, resp. rate 18, height 1.626 m (5' 4\"), weight 46.9 kg (103 lb 6.3 oz), SpO2 97 %, not currently breastfeeding.'     Constitutional:  No acute distress.   HENT:  Oropharynx is clear and moist. No thyromegaly.  Eyes:  Conjunctivae are normal. Pupils equal, round, and reactive to light. No scleral icterus.   Neck: . No tracheal deviation present. No obvious thyroid mass.   Cardiovascular: Sinus tachycardia, normal heart sounds.  No right ventricular heave. No lower extremity edema.  Pulmonary/Chest: No wheezes.  Bilateral rales.  Chest wall is not dull to percussion.  No accessory muscle usage or stridor.  Decreased breath sound intensity  Abdominal: Soft. Bowel sounds present. No distension or hernia. No tenderness.    Musculoskeletal: No cyanosis. No clubbing. No obvious joint deformity.   Lymphadenopathy: No cervical or supraclavicular adenopathy.   Skin: Skin is warm and dry. No rash or nodules on the exposed extremities.  Neurologic sleeping when seen    Results:  CBC:   Recent Labs     01/03/24  0501 01/04/24  0502 01/05/24  0451   WBC 14.1* 11.9* 8.1   HGB 11.8* 10.5* 10.6*   HCT 36.4 31.7* 32.2*   MCV 92.1 91.2 91.9    188 162       BMP:   Recent Labs     01/02/24  2211 01/04/24  0502 01/05/24  0450    140 142   K 4.1 3.7 4.1 
INPATIENT PULMONARY CRITICAL CARE PROGRESS NOTE      Reason for visit    severe COPD with COVID infection     SUBJECTIVE: Patient when evaluated this morning was comfortably sleeping in the bed without any profound shortness of breath or increased work of breathing, patient was afebrile and has not fluctuating blood pressure, patient was on 3 L of nasal oxygen with saturation 98%, patient glycemic control was acceptable, patient has had good urine output, patient has a documented cumulative fluid balance of -240 ml, no other pertinent review of system of concern      Physical Exam:  Blood pressure 138/76, pulse 75, temperature 98.1 °F (36.7 °C), temperature source Oral, resp. rate 24, height 1.626 m (5' 4\"), weight 43.5 kg (95 lb 14.4 oz), SpO2 96 %, not currently breastfeeding.'     Constitutional:  No acute distress.   HENT:  Oropharynx is clear and moist. No thyromegaly.  Eyes:  Conjunctivae are normal. Pupils equal, round, and reactive to light. No scleral icterus.   Neck: . No tracheal deviation present. No obvious thyroid mass.   Cardiovascular: Sinus tachycardia, normal heart sounds.  No right ventricular heave. No lower extremity edema.  Pulmonary/Chest: No wheezes.  Decreased bilateral rales.  Chest wall is not dull to percussion.  No accessory muscle usage or stridor.  Decreased breath sound intensity  Abdominal: Soft. Bowel sounds present. No distension or hernia. No tenderness.    Musculoskeletal: No cyanosis. No clubbing. No obvious joint deformity.   Lymphadenopathy: No cervical or supraclavicular adenopathy.   Skin: Skin is warm and dry. No rash or nodules on the exposed extremities.  Neurologic sleeping when seen    Results:  CBC:   Recent Labs     01/04/24  0502 01/05/24  0451   WBC 11.9* 8.1   HGB 10.5* 10.6*   HCT 31.7* 32.2*   MCV 91.2 91.9    162       BMP:   Recent Labs     01/04/24  0502 01/05/24  0450 01/06/24  0451    142 145   K 3.7 4.1 4.4   CL 98* 98* 102   CO2 30 34* 34* 
INPATIENT PULMONARY CRITICAL CARE PROGRESS NOTE      Reason for visit    severe COPD with COVID infection     SUBJECTIVE: Patient when evaluated this morning was obtained comfortably sleeping in the bed without any profound shortness of breath or increased work of breathing, patient was afebrile and has not fluctuating blood pressure, and was on 2 L of nasal oxygen with saturation of 99% when seen, nursing tried to remove the supplemental oxygen as per the instructions given to the patient but patient started panicking stating that she has more shortness of breath when the oxygen is discontinued, patient is afebrile, patient blood pressure is not optimally controlled, patient has sinus rhythm on the monitor, patient urine output was adequate, patient has a cumulative progress of -1.2 L, patient glycemic control acceptable, no other pertinent review of system of concern      Physical Exam:  Blood pressure (!) 147/84, pulse 82, temperature 98.6 °F (37 °C), temperature source Oral, resp. rate 24, height 1.626 m (5' 4\"), weight 42.8 kg (94 lb 5.7 oz), SpO2 98 %, not currently breastfeeding.'     Constitutional:  No acute distress.   HENT:  Oropharynx is clear and moist. No thyromegaly.  Eyes:  Conjunctivae are normal. Pupils equal, round, and reactive to light. No scleral icterus.   Neck: . No tracheal deviation present. No obvious thyroid mass.   Cardiovascular: Sinus tachycardia, normal heart sounds.  No right ventricular heave. No lower extremity edema.  Pulmonary/Chest: No wheezes.  Significant bilateral rales.  Chest wall is not dull to percussion.  No accessory muscle usage or stridor.  Decreased breath sound intensity  Abdominal: Soft. Bowel sounds present. No distension or hernia. No tenderness.    Musculoskeletal: No cyanosis. No clubbing. No obvious joint deformity.   Lymphadenopathy: No cervical or supraclavicular adenopathy.   Skin: Skin is warm and dry. No rash or nodules on the exposed 
Occupational Therapy  [address]  Occupational Therapy Initial Assessment and Treatment Note    Name: Terri Smith  : 1940  MRN: [id number]  Date of Service: 1/3/2024    Discharge Recommendations:  Subacute/Skilled Nursing Facility  OT Equipment Recommendations  Equipment Needed:  (defer)     Therapy discharge recommendations take into account each patient's current medical complexities and are subject to input/oversight from the patient's healthcare team.     Barriers to Home Discharge:   [] Steps to access home entry or bed/bath:   [x] Unable to transfer, ambulate, or propel wheelchair household distances without assist   [] Limited available assist at home upon discharge    [] Patient or family requests d/c to post-acute facility    [x] Poor cognition/safety awareness for d/c to home alone    [] Unable to maintain ordered weight bearing status    [] Patient with salient signs of long-standing immobility   [x] Decreased independence with ADLs   [x] Increased risk for falls   [] Other:    If pt is unable to be seen after this session, please let this note serve as discharge summary.  Please see case management note for discharge disposition.  Thank you.      Patient Diagnosis(es): The primary encounter diagnosis was COVID-19 virus infection. Diagnoses of Shortness of breath, Diarrhea, unspecified type, NADJA (acute kidney injury) ([facility]), Generalized weakness, and Chronic hypoxemic respiratory failure ([facility]) were also pertinent to this visit.  Past Medical History:  has a past medical history of NADJA (acute kidney injury) ([facility]), Asthma, Atrial fibrillation with RVR ([facility]), CAD (coronary artery disease), CHF (congestive heart failure) ([facility]), Chronic anxiety, COPD (chronic obstructive pulmonary disease) ([facility]), COPD (chronic obstructive pulmonary disease) ([facility]), GERD (gastroesophageal reflux disease), Heart attack ([facility]), History of blood transfusion, Hyperlipidemia, Hypertension, MRSA 
Occupational Therapy  Facility/Department: [facility] A1 REMOTE TELEMETRY  Daily Treatment Note  NAME: Terri Smith  : 1940  MRN: [id number]    Date of Service: 2024    Discharge Recommendations:  Subacute/Skilled Nursing Facility  OT Equipment Recommendations  Equipment Needed:  (defer)    Therapy discharge recommendations take into account each patient's current medical complexities and are subject to input/oversight from the patient's healthcare team.     Barriers to Home Discharge:   [x] Steps to access home entry or bed/bath:   [x] Unable to transfer, ambulate, or propel wheelchair household distances without assist   [x] Limited available assist at home upon discharge    [x] Patient or family requests d/c to post-acute facility    [] Poor cognition/safety awareness for d/c to home alone    [] Unable to maintain ordered weight bearing status    [] Patient with salient signs of long-standing immobility   [x] Decreased independence with ADLs   [x] Increased risk for falls   [] Other:    If pt is unable to be seen after this session, please let this note serve as discharge summary.  Please see case management note for discharge disposition.  Thank you.      Patient Diagnosis(es): The primary encounter diagnosis was COVID-19 virus infection. Diagnoses of Shortness of breath, Diarrhea, unspecified type, NADJA (acute kidney injury) ([facility]), Generalized weakness, and Chronic hypoxemic respiratory failure ([facility]) were also pertinent to this visit.     Assessment    Assessment: Pt agreeable to OT session this date. Pt initially refusing OOB activity but with encouragement pt sat EOB to complete UE Exer. Upon sitting EOB pt O2 stats 83%, found O2 tubing not connected and reconnected to proper tubing and O2 stats returned to 95% on 2.5L O2. Additionally, upon sitting EOB pt endorsed dizziness, BP 92/64, after sitting EOB ~ 2 minutes /72, pt stated improved dizziness with increased time sitting EOB. Pt 
Occupational Therapy  Facility/Department: [facility] C4 PCU  Treatment Note     Name: Terri Smith  : 1940  MRN: [id number]  Date of Service: 2024    Discharge Recommendations:  Subacute/Skilled Nursing Facility  Therapy discharge recommendations take into account each patient's current medical complexities and are subject to input/oversight from the patient's healthcare team.   Barriers to Home Discharge:   [x] Steps to access home entry or bed/bath:   [x] Unable to transfer, ambulate, or propel wheelchair household distances without assist   [x] Limited available assist at home upon discharge    [x] Patient or family requests d/c to post-acute facility       If pt is unable to be seen after this session, please let this note serve as discharge summary.  Please see case management note for discharge disposition.  Thank you.        Patient Diagnosis(es): The primary encounter diagnosis was COVID-19 virus infection. Diagnoses of Shortness of breath, Diarrhea, unspecified type, NADJA (acute kidney injury) ([facility]), Generalized weakness, and Chronic hypoxemic respiratory failure ([facility]) were also pertinent to this visit.  Past Medical History:  has a past medical history of NADJA (acute kidney injury) (H[facility]), Asthma, Atrial fibrillation with RVR ([facility]CC), CAD (coronary artery disease), CHF (congestive heart failure) (HCC), Chronic anxiety, COPD (chronic obstructive pulmonary disease) (HCC), COPD (chronic obstructive pulmonary disease) (HCC), GERD (gastroesophageal reflux disease), Heart attack (HCC), History of blood transfusion, Hyperlipidemia, Hypertension, MRSA (methicillin resistant staph aureus) culture positive, OA (osteoarthritis), and Thyroid disease.  Past Surgical History:  has a past surgical history that includes  section; Mastectomy, partial (Left); bronchoscopy (2019); Cardiac catheterization (2019); Coronary artery bypass graft (N/A, 2019); Colonoscopy (N/A, 2020); 
Occupational Therapy/Physical Therapy  Attempted OT/PT.  Pt refused therapy d/t not feeling well this am. RN recommended to reattempt later. Cont OT/PT.  Noris Ballard OT  Nemo Mohamud PT    
Patient complaining of Sore Throat. Amanda Warren NP notified. Phenol 1.4% mouth spray, 1 spray PRN Every 2 hours ordered.   
Patient had a Large red, bloody looking, stool. Amanda Warren NP notified. Blood Occult stool lab ordered and obtained. Resulted and notified Amanda Warren NP. Hemoglobin and Hematocrit labs ordered.   
Pt d/c'd to [facility].  Removed  IV and stopped bleeding.  Catheter intact. Pt tolerated well. No redness noted at site.  Notified [facility] and removed tele box. Reviewed d/c instructions, home meds, and  f/u information utilizing teach-back method.  Patient verbalized understanding. Patient wheeled to front entrance with all belongings.     
Report given to Radha at [facility]  
Regular  ADULT ORAL NUTRITION SUPPLEMENT; Breakfast, Dinner, Lunch; Standard High Calorie/High Protein Oral Supplement   DVT Prophylaxis [] Lovenox, []  Heparin, [] SCDs, [] Ambulation,  [x] Eliquis, [] Xarelto  [] Coumadin   Code Status Full Code   Disposition F  Expected Disposition: Home health care versus SNF  Estimated Date of Discharge: 2 days  Patient requires continued admission due to COVID-19 pneumonia   Surrogate Decision Maker/ Juan Farley (Child)      Personally reviewed Lab Studies and Imaging       Imaging that was interpreted personally includes chest x-ray and results no acute infiltrates.    Drugs that require monitoring for toxicity include Eliquis and the method of monitoring was CBC        Subjective:     Chief Complaint:   Chief Complaint   Patient presents with    Abdominal Pain     Loose stools, feeling SOB.  Just Dx with pneumonia at [facility]      Seen and examined in the morning.  States he is feeling much better today.  No more bloody bowel movements.      Review of Systems:      Pertinent positives and negatives discussed in HPI    Objective:     Intake/Output Summary (Last 24 hours) at 1/8/2024 1425  Last data filed at 1/8/2024 1330  Gross per 24 hour   Intake 1370 ml   Output 1100 ml   Net 270 ml        Vitals:   Vitals:    01/08/24 0917 01/08/24 0956 01/08/24 1118 01/08/24 1404   BP: (!) 152/79  130/75 126/76   Pulse: 87  75 82   Resp: 22  20 20   Temp: 97.9 °F (36.6 °C)  97.6 °F (36.4 °C) 97.8 °F (36.6 °C)   TempSrc: Oral  Oral Oral   SpO2: 99% 98% 99% 97%   Weight:       Height:             Physical Exam:      General: NAD  Eyes: EOMI  ENT: neck supple  Cardiovascular: Regular rate.  Regular rhythm  Respiratory: Rhonchi bilaterally  Gastrointestinal: Soft, non tender  Genitourinary: no suprapubic tenderness  Musculoskeletal: No edema  Skin: warm, dry  Neuro: Alert. Oriented to time, place, person.  Strength 5/5 in bilateral lower and upper extremities.  Sensation is intact to 
flush  10 mL IntraVENous 2 times per day    apixaban  2.5 mg Oral BID    azithromycin  500 mg IntraVENous Q24H    mometasone-formoterol  2 puff Inhalation BID RT    tiotropium  2 puff Inhalation Daily RT    dexAMETHasone  6 mg Oral Daily    insulin lispro  0-8 Units SubCUTAneous TID WC    insulin lispro  0-4 Units SubCUTAneous Nightly      Infusions:    sodium chloride      dextrose       PRN Meds: busPIRone, 5 mg, Q8H PRN  sodium chloride flush, 10 mL, PRN  sodium chloride, , PRN  ondansetron, 4 mg, Q8H PRN   Or  ondansetron, 4 mg, Q6H PRN  senna, 1 tablet, Daily PRN  acetaminophen, 650 mg, Q6H PRN   Or  acetaminophen, 650 mg, Q6H PRN  albuterol sulfate HFA, 2 puff, Q6H PRN  glucose, 4 tablet, PRN  dextrose bolus, 125 mL, PRN   Or  dextrose bolus, 250 mL, PRN  glucagon (rDNA), 1 mg, PRN  dextrose, , Continuous PRN        Labs and Imaging   XR CHEST PORTABLE    Result Date: 1/2/2024  EXAMINATION: ONE XRAY VIEW OF THE CHEST 1/2/2024 9:56 pm COMPARISON: 12/18/2023 HISTORY: ORDERING SYSTEM PROVIDED HISTORY: SOB TECHNOLOGIST PROVIDED HISTORY: Reason for exam:->SOB Reason for Exam: Abdominal Pain FINDINGS: The heart is normal.  The pulmonary vessels are less prominent.  The lungs are hyperinflated emphysematous there is mild increased interstitial markings throughout which is less prominent there is some bibasilar interstitial prominence throughout which is decreased no effusion is seen.  There are postop changes along the left heart border which is unchanged.     Resolving pulmonary interstitial edema. Chronic obstructive lung changes with probable underlying chronic interstitial lung disease which is less prominent with no obvious infiltrate or effusion..       CBC:   Recent Labs     01/02/24 2211 01/03/24  0501 01/04/24  0502   WBC 18.9* 14.1* 11.9*   HGB 12.1 11.8* 10.5*    203 188       BMP:    Recent Labs     01/02/24  2211 01/04/24  0502    140   K 4.1 3.7   CL 94* 98*   CO2 27 30   BUN 40* 62* 
    Troponin: No results found for: \"TROPONINT\"  Lactic Acid: No results for input(s): \"LACTA\" in the last 72 hours.  BNP:   Recent Labs     01/02/24  2211   PROBNP 10,170*     UA:  Lab Results   Component Value Date/Time    NITRU Negative 12/15/2023 06:26 PM    COLORU Yellow 12/15/2023 06:26 PM    PHUR 7.0 12/15/2023 06:26 PM    WBCUA 21-50 08/30/2023 01:50 PM    RBCUA 5-10 08/30/2023 01:50 PM    MUCUS Rare 08/30/2023 01:50 PM    YEAST Present 10/12/2021 04:05 AM    BACTERIA 2+ 08/30/2023 01:50 PM    CLARITYU Clear 12/15/2023 06:26 PM    SPECGRAV 1.010 12/15/2023 06:26 PM    LEUKOCYTESUR Negative 12/15/2023 06:26 PM    UROBILINOGEN 0.2 12/15/2023 06:26 PM    BILIRUBINUR Negative 12/15/2023 06:26 PM    BILIRUBINUR neg 05/07/2013 01:35 PM    BLOODU Negative 12/15/2023 06:26 PM    GLUCOSEU Negative 12/15/2023 06:26 PM    KETUA Negative 12/15/2023 06:26 PM    AMORPHOUS 2+ 07/05/2023 06:11 PM     Urine Cultures:   Lab Results   Component Value Date/Time    LABURIN  08/30/2023 02:09 PM     <10,000 CFU/ml mixed skin/urogenital milad. No further workup     Blood Cultures:   Lab Results   Component Value Date/Time    BC No Growth after 4 days of incubation. 08/19/2023 08:30 PM     Lab Results   Component Value Date/Time    BLOODCULT2 No Growth after 4 days of incubation. 08/19/2023 08:35 PM     Organism:   Lab Results   Component Value Date/Time    ORG BHS Group B (Strep agalacticae) 07/05/2023 06:11 PM         Electronically signed by Tadeo Kerr MD on 1/3/2024 at 10:31 AM  
  Recent Labs     01/04/24  0502 01/05/24  0450 01/06/24  0451    142 145   K 3.7 4.1 4.4   CL 98* 98* 102   CO2 30 34* 34*   BUN 62* 60* 55*   CREATININE 1.7* 1.5* 1.1   GLUCOSE 121* 115* 95       Hepatic:   Recent Labs     01/04/24  0502 01/05/24  0450 01/06/24  0451   AST 14* 22 31   ALT 7* 13 26   BILITOT <0.2 <0.2 <0.2   ALKPHOS 78 80 78       Lipids:   Lab Results   Component Value Date/Time    CHOL 139 08/20/2023 05:50 AM    HDL 61 08/20/2023 05:50 AM    TRIG 88 08/20/2023 05:50 AM     Hemoglobin A1C:   Lab Results   Component Value Date/Time    LABA1C 5.9 01/04/2023 01:35 PM     TSH:   Lab Results   Component Value Date/Time    TSH 96.71 01/10/2022 01:00 PM     Troponin: No results found for: \"TROPONINT\"  Lactic Acid: No results for input(s): \"LACTA\" in the last 72 hours.  BNP:   No results for input(s): \"PROBNP\" in the last 72 hours.    UA:  Lab Results   Component Value Date/Time    NITRU Negative 12/15/2023 06:26 PM    COLORU Yellow 12/15/2023 06:26 PM    PHUR 7.0 12/15/2023 06:26 PM    WBCUA 21-50 08/30/2023 01:50 PM    RBCUA 5-10 08/30/2023 01:50 PM    MUCUS Rare 08/30/2023 01:50 PM    YEAST Present 10/12/2021 04:05 AM    BACTERIA 2+ 08/30/2023 01:50 PM    CLARITYU Clear 12/15/2023 06:26 PM    SPECGRAV 1.010 12/15/2023 06:26 PM    LEUKOCYTESUR Negative 12/15/2023 06:26 PM    UROBILINOGEN 0.2 12/15/2023 06:26 PM    BILIRUBINUR Negative 12/15/2023 06:26 PM    BILIRUBINUR neg 05/07/2013 01:35 PM    BLOODU Negative 12/15/2023 06:26 PM    GLUCOSEU Negative 12/15/2023 06:26 PM    KETUA Negative 12/15/2023 06:26 PM    AMORPHOUS 2+ 07/05/2023 06:11 PM     Urine Cultures:   Lab Results   Component Value Date/Time    LABURIN  08/30/2023 02:09 PM     <10,000 CFU/ml mixed skin/urogenital milad. No further workup     Blood Cultures:   Lab Results   Component Value Date/Time    BC  01/02/2024 10:11 PM     No Growth to date.  Any change in status will be called.     Lab Results   Component Value Date/Time    
congestion TECHNOLOGIST PROVIDED HISTORY: Reason for exam:->congestion Reason for Exam: congestion, dizzy FINDINGS: The cardiomediastinal silhouette is unremarkable.  The lungs are clear.  No infiltrate, pleural fluid or evidence of overt failure.  Pulmonary emphysema with flattening of the hemidiaphragms, similar to the previous study. Postoperative clips in the left hilum.     No acute cardiopulmonary disease.  COPD.             Assessment:  Principal Problem:    COVID-19 virus infection  Active Problems:    Former smoker    COPD exacerbation (HCC)    Elevated brain natriuretic peptide (BNP) level    Leukocytosis    Elevated procalcitonin    Stage 3a chronic kidney disease (HCC)    SOB (shortness of breath)    Pulmonary venous congestion    Pulmonary HTN (HCC)    Centrilobular emphysema (HCC)  Resolved Problems:    * No resolved hospital problems. *          Plan:   Oxygen supplementation to keep saturation between 90 and 94% only  Please titrate oxygen as per the above parameters  Patient was on 2-1/2 L of nasal cannula oxygen with saturation 100% when evaluated this morning-patient's oxygen can be tapered  Pulmonary toilet  Patient does have current COVID-19 infection but does not appear to have COVID-19 pneumonia  Patient does not have some pulmonary venous congestion along with elevated BNP along with that patient has pulmonary hypertension along with that patient has mild to moderate aortic stenosis on recent echocardiogram  Keep negative fluid balance if hemodynamics and renal function supports that-patient renal function is deteriorating and nephrology consult was requested and patient not to be given diuretics at this time  Patient is creatinine is still on the higher side which needs to be trended-worsening as compared to yesterday  Monitor input output and BMP  Correct electrolytes on whenever necessary basis  Bronchodilators  There is a competent of patient having COPD exacerbation  Patient has been 
of Care;Transfer Training;Home Exercise Program;Energy Conservation  Education Provided Comments: importance of mobility with therapy and time spent OOB.  Education Method: Verbal;Demonstration  Barriers to Learning: Other (Comment) (anxiety.)  Education Outcome: Verbalized understanding;Continued education needed    AM-PAC - Mobility    AM-PAC Basic Mobility - Inpatient   How much help is needed turning from your back to your side while in a flat bed without using bedrails?: A Little  How much help is needed moving from lying on your back to sitting on the side of a flat bed without using bedrails?: A Little  How much help is needed moving to and from a bed to a chair?: A Little  How much help is needed standing up from a chair using your arms?: A Little  How much help is needed walking in hospital room?: A Little  How much help is needed climbing 3-5 steps with a railing?: A Lot  AM-PAC Inpatient Mobility Raw Score : 17  AM-PAC Inpatient T-Scale Score : 42.13  Mobility Inpatient CMS 0-100% Score: 50.57  Mobility Inpatient CMS G-Code Modifier : CK         Therapy Time   Individual Concurrent Group Co-treatment   Time In 1533         Time Out 1559         Minutes 26         Timed Code Treatment Minutes: 26 Minutes       Galileo Blanco, PT, DPT  If pt is unable to be seen after this session, please let this note serve as discharge summary.  Please see case management note for discharge disposition.  Thank you.             
reading and potential changes with position, HEP, importance of mobility with therapy  Education Method: Verbal;Demonstration  Barriers to Learning: Other (Comment) (anxiety)  Education Outcome: Verbalized understanding;Continued education needed      Therapy Time   Individual Concurrent Group Co-treatment   Time In 1557         Time Out 1634         Minutes 37         Timed Code Treatment Minutes: 27 Minutes (10 min eval)       Gabbie Freitas, PT

## 2024-01-09 NOTE — PLAN OF CARE
Problem: Safety - Adult  Goal: Free from fall injury  Outcome: Progressing  Note: Pt is free from falls this shift.  Bed in lowest position and locked, side rails up x2, call light within reach.  Bed alarm in place and activated.  Pt calls out for needs and assistance.       Problem: Neurosensory - Adult  Goal: Achieves stable or improved neurological status  Outcome: Progressing  Note: Pt A&O x4 this shift.       Problem: Respiratory - Adult  Goal: Achieves optimal ventilation and oxygenation  Outcome: Progressing  Note: Pt on 2-2.5L O2 per nasal cannula this shift, with O2 sats greater than 92%.       Problem: Cardiovascular - Adult  Goal: Maintains optimal cardiac output and hemodynamic stability  Outcome: Progressing  Note: NSR on tele this shift.       Problem: Skin/Tissue Integrity - Adult  Goal: Skin integrity remains intact  Outcome: Progressing  Note: Sacral heart in place on coccyx.  Pt able to turn and reposition self independently, encouraged to do so.       Problem: Genitourinary - Adult  Goal: Absence of urinary retention  Outcome: Progressing  Note: External catheter in place.       Problem: Hematologic - Adult  Goal: Maintains hematologic stability  Outcome: Progressing  Note: H&H stable 10.6/32.4 this a.m.

## 2024-01-09 NOTE — DISCHARGE SUMMARY
INR 1.13 08/03/2023       Radiology:  XR CHEST PORTABLE    Result Date: 1/2/2024  EXAMINATION: ONE XRAY VIEW OF THE CHEST 1/2/2024 9:56 pm COMPARISON: 12/18/2023 HISTORY: ORDERING SYSTEM PROVIDED HISTORY: SOB TECHNOLOGIST PROVIDED HISTORY: Reason for exam:->SOB Reason for Exam: Abdominal Pain FINDINGS: The heart is normal.  The pulmonary vessels are less prominent.  The lungs are hyperinflated emphysematous there is mild increased interstitial markings throughout which is less prominent there is some bibasilar interstitial prominence throughout which is decreased no effusion is seen.  There are postop changes along the left heart border which is unchanged.     Resolving pulmonary interstitial edema. Chronic obstructive lung changes with probable underlying chronic interstitial lung disease which is less prominent with no obvious infiltrate or effusion..     XR CHEST PORTABLE    Result Date: 12/18/2023  EXAMINATION: ONE XRAY VIEW OF THE CHEST 12/18/2023 8:51 pm COMPARISON: 12/15/2023 HISTORY: ORDERING SYSTEM PROVIDED HISTORY: SOB TECHNOLOGIST PROVIDED HISTORY: Reason for exam:->SOB Reason for Exam: SOB. rapid response called. FINDINGS: Heart size is normal  Aorta is normal.  Lungs are normally expanded.  Chronic lung changes.  No definite consolidation..  No pleural effusions. Diffuse osteopenia.     No acute lung disease.     CT Head W/O Contrast    Result Date: 12/15/2023  EXAMINATION: CT OF THE HEAD WITHOUT CONTRAST  12/15/2023 9:16 pm TECHNIQUE: CT of the head was performed without the administration of intravenous contrast. Automated exposure control, iterative reconstruction, and/or weight based adjustment of the mA/kV was utilized to reduce the radiation dose to as low as reasonably achievable. COMPARISON: None. HISTORY: ORDERING SYSTEM PROVIDED HISTORY: dizziness TECHNOLOGIST PROVIDED HISTORY: Reason for exam:->dizziness Has a \"code stroke\" or \"stroke alert\" been called?->No Decision Support Exception -

## 2024-01-19 ENCOUNTER — TELEMEDICINE (OUTPATIENT)
Dept: PULMONOLOGY | Age: 84
End: 2024-01-19
Payer: MEDICARE

## 2024-01-19 DIAGNOSIS — J44.9 CHRONIC OBSTRUCTIVE PULMONARY DISEASE, UNSPECIFIED COPD TYPE (HCC): Primary | ICD-10-CM

## 2024-01-19 PROCEDURE — G8428 CUR MEDS NOT DOCUMENT: HCPCS | Performed by: INTERNAL MEDICINE

## 2024-01-19 PROCEDURE — 1090F PRES/ABSN URINE INCON ASSESS: CPT | Performed by: INTERNAL MEDICINE

## 2024-01-19 PROCEDURE — G8400 PT W/DXA NO RESULTS DOC: HCPCS | Performed by: INTERNAL MEDICINE

## 2024-01-19 PROCEDURE — G8484 FLU IMMUNIZE NO ADMIN: HCPCS | Performed by: INTERNAL MEDICINE

## 2024-01-19 PROCEDURE — 1123F ACP DISCUSS/DSCN MKR DOCD: CPT | Performed by: INTERNAL MEDICINE

## 2024-01-19 PROCEDURE — 3023F SPIROM DOC REV: CPT | Performed by: INTERNAL MEDICINE

## 2024-01-19 PROCEDURE — 1036F TOBACCO NON-USER: CPT | Performed by: INTERNAL MEDICINE

## 2024-01-19 PROCEDURE — G8419 CALC BMI OUT NRM PARAM NOF/U: HCPCS | Performed by: INTERNAL MEDICINE

## 2024-01-19 PROCEDURE — 99214 OFFICE O/P EST MOD 30 MIN: CPT | Performed by: INTERNAL MEDICINE

## 2024-01-19 PROCEDURE — 1111F DSCHRG MED/CURRENT MED MERGE: CPT | Performed by: INTERNAL MEDICINE

## 2024-01-19 NOTE — PROGRESS NOTES
P  [facility]                                               Pulmonary Clinic Consult     I had the pleasure of seeing  Terri Smith     Chief Complaint   Patient presents with    Follow-up     Copd   Hospital        HISTORY OF PRESENT ILLNESS:    Terri Smith is a 83 y.o. year old  has about 30 PPY smoking history     The Patient comes in with SOB that has been going on the last 5-6 years and has been to hospital multiple times and she is on 2.5 L     Her SOB  Associated with some cough and has some sputum ,clear sputum     She  states that it get worse with exercise or walking long distance and he can walk 50 with O2 And go 1-2 flight of stairs before get short winded    She daiana albuterol and trelegy       Last CT chest 1/23 shows emphysema and some fibrotic changes       Today visit  She states her SOB has improved  She is on albuterol   Mild ZENDEJAS when ambulate with mild cough  No change in sputum color  Use NIMV 4-7 hours     ALLERGIES:    Allergies   Allergen Reactions    Latex Other (See Comments)     Burning    Other reaction(s): Dermatitis, Other (See Comments)  Burning  Burning      Codeine Other (See Comments)     \"hallucinations\"  Other reaction(s): Other (See Comments)  \"hallucinations\"  Hallucinations    Hallucinations         PAST MEDICAL HISTORY:       Diagnosis Date    NADJA (acute kidney injury) ([facility])     Asthma     Atrial fibrillation with RVR ([facility])     CAD (coronary artery disease)     CHF (congestive heart failure) ([facility])     unsure    Chronic anxiety     COPD (chronic obstructive pulmonary disease) ([facility])     COPD (chronic obstructive pulmonary disease) ([facility]) 08/19/2023    GERD (gastroesophageal reflux disease) 09/09/2021    Heart attack ([facility]) 2019    History of blood transfusion     Hyperlipidemia     Hypertension     MRSA (methicillin resistant staph aureus) culture positive 09/22/2019    + resp cx    OA (osteoarthritis) 08/12/2014    Thyroid

## 2024-02-02 ENCOUNTER — HOSPITAL ENCOUNTER (INPATIENT)
Age: 84
LOS: 4 days | Discharge: NURSING, REHAB OR LONG-TERM CARE | DRG: 811 | End: 2024-02-06
Attending: STUDENT IN AN ORGANIZED HEALTH CARE EDUCATION/TRAINING PROGRAM | Admitting: INTERNAL MEDICINE
Payer: MEDICARE

## 2024-02-02 ENCOUNTER — APPOINTMENT (OUTPATIENT)
Dept: CT IMAGING | Age: 84
DRG: 811 | End: 2024-02-02
Payer: MEDICARE

## 2024-02-02 ENCOUNTER — APPOINTMENT (OUTPATIENT)
Dept: GENERAL RADIOLOGY | Age: 84
DRG: 811 | End: 2024-02-02
Payer: MEDICARE

## 2024-02-02 DIAGNOSIS — Z87.19 HISTORY OF GI BLEED: ICD-10-CM

## 2024-02-02 DIAGNOSIS — Z99.81 OXYGEN DEPENDENT: ICD-10-CM

## 2024-02-02 DIAGNOSIS — D62 ANEMIA DUE TO ACUTE BLOOD LOSS: ICD-10-CM

## 2024-02-02 DIAGNOSIS — D64.9 ANEMIA, UNSPECIFIED TYPE: Primary | ICD-10-CM

## 2024-02-02 DIAGNOSIS — Z79.01 ANTICOAGULATED: ICD-10-CM

## 2024-02-02 PROBLEM — K64.8 OTHER HEMORRHOIDS: Status: ACTIVE | Noted: 2024-02-02

## 2024-02-02 LAB
ABO + RH BLD: NORMAL
ABO + RH BLD: NORMAL
ALBUMIN SERPL-MCNC: 3.4 G/DL (ref 3.4–5)
ALBUMIN/GLOB SERPL: 1 {RATIO} (ref 1.1–2.2)
ALP SERPL-CCNC: 91 U/L (ref 40–129)
ALT SERPL-CCNC: <5 U/L (ref 10–40)
ANION GAP SERPL CALCULATED.3IONS-SCNC: 7 MMOL/L (ref 3–16)
ANTI-XA UNFRAC HEPARIN: >1.1 IU/ML (ref 0.3–0.7)
AST SERPL-CCNC: 15 U/L (ref 15–37)
BASOPHILS # BLD: 0.1 K/UL (ref 0–0.2)
BASOPHILS NFR BLD: 1 %
BILIRUB SERPL-MCNC: <0.2 MG/DL (ref 0–1)
BLD GP AB SCN SERPL QL: NORMAL
BLOOD BANK DISPENSE STATUS: NORMAL
BLOOD BANK PRODUCT CODE: NORMAL
BPU ID: NORMAL
BUN SERPL-MCNC: 16 MG/DL (ref 7–20)
CALCIUM SERPL-MCNC: 9.3 MG/DL (ref 8.3–10.6)
CHLORIDE SERPL-SCNC: 102 MMOL/L (ref 99–110)
CO2 SERPL-SCNC: 33 MMOL/L (ref 21–32)
CREAT SERPL-MCNC: 1 MG/DL (ref 0.6–1.2)
DEPRECATED RDW RBC AUTO: 15 % (ref 12.4–15.4)
DESCRIPTION BLOOD BANK: NORMAL
EKG ATRIAL RATE: 99 BPM
EKG DIAGNOSIS: NORMAL
EKG P AXIS: 87 DEGREES
EKG P-R INTERVAL: 152 MS
EKG Q-T INTERVAL: 350 MS
EKG QRS DURATION: 80 MS
EKG QTC CALCULATION (BAZETT): 449 MS
EKG R AXIS: -63 DEGREES
EKG T AXIS: 78 DEGREES
EKG VENTRICULAR RATE: 99 BPM
EOSINOPHIL # BLD: 0.1 K/UL (ref 0–0.6)
EOSINOPHIL NFR BLD: 1.1 %
FLUAV RNA RESP QL NAA+PROBE: NOT DETECTED
FLUBV RNA RESP QL NAA+PROBE: NOT DETECTED
GFR SERPLBLD CREATININE-BSD FMLA CKD-EPI: 56 ML/MIN/{1.73_M2}
GLUCOSE BLD-MCNC: 109 MG/DL (ref 70–99)
GLUCOSE SERPL-MCNC: 131 MG/DL (ref 70–99)
HCT VFR BLD AUTO: 23 % (ref 36–48)
HCT VFR BLD AUTO: 25.8 % (ref 36–48)
HEMOCCULT STL QL: NORMAL
HGB BLD-MCNC: 7.5 G/DL (ref 12–16)
HGB BLD-MCNC: 9.1 G/DL (ref 12–16)
LIPASE SERPL-CCNC: 58 U/L (ref 13–60)
LYMPHOCYTES # BLD: 1.1 K/UL (ref 1–5.1)
LYMPHOCYTES NFR BLD: 12.4 %
MCH RBC QN AUTO: 28.8 PG (ref 26–34)
MCHC RBC AUTO-ENTMCNC: 32.6 G/DL (ref 31–36)
MCV RBC AUTO: 88.1 FL (ref 80–100)
MONOCYTES # BLD: 0.4 K/UL (ref 0–1.3)
MONOCYTES NFR BLD: 4.2 %
NEUTROPHILS # BLD: 7.2 K/UL (ref 1.7–7.7)
NEUTROPHILS NFR BLD: 81.3 %
PERFORMED ON: ABNORMAL
PLATELET # BLD AUTO: 568 K/UL (ref 135–450)
PMV BLD AUTO: 6.7 FL (ref 5–10.5)
POTASSIUM SERPL-SCNC: 4.6 MMOL/L (ref 3.5–5.1)
PROT SERPL-MCNC: 6.7 G/DL (ref 6.4–8.2)
RBC # BLD AUTO: 2.61 M/UL (ref 4–5.2)
SARS-COV-2 RNA RESP QL NAA+PROBE: NOT DETECTED
SODIUM SERPL-SCNC: 142 MMOL/L (ref 136–145)
WBC # BLD AUTO: 8.9 K/UL (ref 4–11)

## 2024-02-02 PROCEDURE — 85018 HEMOGLOBIN: CPT

## 2024-02-02 PROCEDURE — 85520 HEPARIN ASSAY: CPT

## 2024-02-02 PROCEDURE — 86850 RBC ANTIBODY SCREEN: CPT

## 2024-02-02 PROCEDURE — 93010 ELECTROCARDIOGRAM REPORT: CPT | Performed by: INTERNAL MEDICINE

## 2024-02-02 PROCEDURE — 83550 IRON BINDING TEST: CPT

## 2024-02-02 PROCEDURE — 30233N1 TRANSFUSION OF NONAUTOLOGOUS RED BLOOD CELLS INTO PERIPHERAL VEIN, PERCUTANEOUS APPROACH: ICD-10-PCS | Performed by: INTERNAL MEDICINE

## 2024-02-02 PROCEDURE — 6370000000 HC RX 637 (ALT 250 FOR IP): Performed by: INTERNAL MEDICINE

## 2024-02-02 PROCEDURE — 6360000004 HC RX CONTRAST MEDICATION: Performed by: NURSE PRACTITIONER

## 2024-02-02 PROCEDURE — 1200000000 HC SEMI PRIVATE

## 2024-02-02 PROCEDURE — 36430 TRANSFUSION BLD/BLD COMPNT: CPT

## 2024-02-02 PROCEDURE — 85014 HEMATOCRIT: CPT

## 2024-02-02 PROCEDURE — P9016 RBC LEUKOCYTES REDUCED: HCPCS

## 2024-02-02 PROCEDURE — 80053 COMPREHEN METABOLIC PANEL: CPT

## 2024-02-02 PROCEDURE — 93005 ELECTROCARDIOGRAM TRACING: CPT | Performed by: STUDENT IN AN ORGANIZED HEALTH CARE EDUCATION/TRAINING PROGRAM

## 2024-02-02 PROCEDURE — 99285 EMERGENCY DEPT VISIT HI MDM: CPT

## 2024-02-02 PROCEDURE — 86900 BLOOD TYPING SEROLOGIC ABO: CPT

## 2024-02-02 PROCEDURE — 6370000000 HC RX 637 (ALT 250 FOR IP): Performed by: NURSE PRACTITIONER

## 2024-02-02 PROCEDURE — 85025 COMPLETE CBC W/AUTO DIFF WBC: CPT

## 2024-02-02 PROCEDURE — 86923 COMPATIBILITY TEST ELECTRIC: CPT

## 2024-02-02 PROCEDURE — 83540 ASSAY OF IRON: CPT

## 2024-02-02 PROCEDURE — 74177 CT ABD & PELVIS W/CONTRAST: CPT

## 2024-02-02 PROCEDURE — 83690 ASSAY OF LIPASE: CPT

## 2024-02-02 PROCEDURE — 94640 AIRWAY INHALATION TREATMENT: CPT

## 2024-02-02 PROCEDURE — 2700000000 HC OXYGEN THERAPY PER DAY

## 2024-02-02 PROCEDURE — 99223 1ST HOSP IP/OBS HIGH 75: CPT | Performed by: NURSE PRACTITIONER

## 2024-02-02 PROCEDURE — 71046 X-RAY EXAM CHEST 2 VIEWS: CPT

## 2024-02-02 PROCEDURE — 83036 HEMOGLOBIN GLYCOSYLATED A1C: CPT

## 2024-02-02 PROCEDURE — 87636 SARSCOV2 & INF A&B AMP PRB: CPT

## 2024-02-02 PROCEDURE — 86901 BLOOD TYPING SEROLOGIC RH(D): CPT

## 2024-02-02 PROCEDURE — 82270 OCCULT BLOOD FECES: CPT

## 2024-02-02 PROCEDURE — 36415 COLL VENOUS BLD VENIPUNCTURE: CPT

## 2024-02-02 PROCEDURE — 2580000003 HC RX 258: Performed by: NURSE PRACTITIONER

## 2024-02-02 PROCEDURE — 94761 N-INVAS EAR/PLS OXIMETRY MLT: CPT

## 2024-02-02 RX ORDER — ONDANSETRON 2 MG/ML
4 INJECTION INTRAMUSCULAR; INTRAVENOUS EVERY 6 HOURS PRN
Status: DISCONTINUED | OUTPATIENT
Start: 2024-02-02 | End: 2024-02-06 | Stop reason: HOSPADM

## 2024-02-02 RX ORDER — GLUCAGON 1 MG/ML
1 KIT INJECTION PRN
Status: DISCONTINUED | OUTPATIENT
Start: 2024-02-02 | End: 2024-02-06 | Stop reason: HOSPADM

## 2024-02-02 RX ORDER — LACTOBACILLUS RHAMNOSUS GG 10B CELL
1 CAPSULE ORAL
Status: DISCONTINUED | OUTPATIENT
Start: 2024-02-03 | End: 2024-02-06 | Stop reason: HOSPADM

## 2024-02-02 RX ORDER — FERROUS SULFATE 325(65) MG
325 TABLET ORAL
Status: DISCONTINUED | OUTPATIENT
Start: 2024-02-03 | End: 2024-02-03

## 2024-02-02 RX ORDER — ALBUTEROL SULFATE 2.5 MG/3ML
2.5 SOLUTION RESPIRATORY (INHALATION) EVERY 4 HOURS PRN
Status: DISCONTINUED | OUTPATIENT
Start: 2024-02-02 | End: 2024-02-06 | Stop reason: HOSPADM

## 2024-02-02 RX ORDER — FLUTICASONE PROPIONATE 50 MCG
1 SPRAY, SUSPENSION (ML) NASAL DAILY PRN
Status: DISCONTINUED | OUTPATIENT
Start: 2024-02-02 | End: 2024-02-06 | Stop reason: HOSPADM

## 2024-02-02 RX ORDER — INSULIN LISPRO 100 [IU]/ML
0-4 INJECTION, SOLUTION INTRAVENOUS; SUBCUTANEOUS NIGHTLY
Status: DISCONTINUED | OUTPATIENT
Start: 2024-02-02 | End: 2024-02-03

## 2024-02-02 RX ORDER — DEXTROSE MONOHYDRATE 100 MG/ML
INJECTION, SOLUTION INTRAVENOUS CONTINUOUS PRN
Status: DISCONTINUED | OUTPATIENT
Start: 2024-02-02 | End: 2024-02-06 | Stop reason: HOSPADM

## 2024-02-02 RX ORDER — INSULIN LISPRO 100 [IU]/ML
0-4 INJECTION, SOLUTION INTRAVENOUS; SUBCUTANEOUS
Status: DISCONTINUED | OUTPATIENT
Start: 2024-02-02 | End: 2024-02-03

## 2024-02-02 RX ORDER — SODIUM CHLORIDE 9 MG/ML
INJECTION, SOLUTION INTRAVENOUS PRN
Status: DISCONTINUED | OUTPATIENT
Start: 2024-02-02 | End: 2024-02-06 | Stop reason: HOSPADM

## 2024-02-02 RX ORDER — ACETAMINOPHEN 650 MG/1
650 SUPPOSITORY RECTAL EVERY 6 HOURS PRN
Status: DISCONTINUED | OUTPATIENT
Start: 2024-02-02 | End: 2024-02-06 | Stop reason: HOSPADM

## 2024-02-02 RX ORDER — SODIUM CHLORIDE 9 MG/ML
INJECTION, SOLUTION INTRAVENOUS CONTINUOUS
Status: DISCONTINUED | OUTPATIENT
Start: 2024-02-02 | End: 2024-02-06 | Stop reason: HOSPADM

## 2024-02-02 RX ORDER — ONDANSETRON 4 MG/1
4 TABLET, ORALLY DISINTEGRATING ORAL EVERY 8 HOURS PRN
Status: DISCONTINUED | OUTPATIENT
Start: 2024-02-02 | End: 2024-02-06 | Stop reason: HOSPADM

## 2024-02-02 RX ORDER — PANTOPRAZOLE SODIUM 40 MG/10ML
40 INJECTION, POWDER, LYOPHILIZED, FOR SOLUTION INTRAVENOUS ONCE
Status: DISCONTINUED | OUTPATIENT
Start: 2024-02-02 | End: 2024-02-05

## 2024-02-02 RX ORDER — ROFLUMILAST 500 UG/1
500 TABLET ORAL DAILY
Status: DISCONTINUED | OUTPATIENT
Start: 2024-02-02 | End: 2024-02-06 | Stop reason: HOSPADM

## 2024-02-02 RX ORDER — GUAIFENESIN 600 MG/1
600 TABLET, EXTENDED RELEASE ORAL DAILY
Status: DISCONTINUED | OUTPATIENT
Start: 2024-02-02 | End: 2024-02-06 | Stop reason: HOSPADM

## 2024-02-02 RX ORDER — ACETAMINOPHEN 325 MG/1
650 TABLET ORAL EVERY 6 HOURS PRN
Status: DISCONTINUED | OUTPATIENT
Start: 2024-02-02 | End: 2024-02-06 | Stop reason: HOSPADM

## 2024-02-02 RX ORDER — SODIUM CHLORIDE 0.9 % (FLUSH) 0.9 %
5-40 SYRINGE (ML) INJECTION PRN
Status: DISCONTINUED | OUTPATIENT
Start: 2024-02-02 | End: 2024-02-06 | Stop reason: HOSPADM

## 2024-02-02 RX ORDER — SODIUM CHLORIDE 9 MG/ML
INJECTION, SOLUTION INTRAVENOUS PRN
Status: DISCONTINUED | OUTPATIENT
Start: 2024-02-02 | End: 2024-02-04

## 2024-02-02 RX ORDER — BUDESONIDE AND FORMOTEROL FUMARATE DIHYDRATE 160; 4.5 UG/1; UG/1
2 AEROSOL RESPIRATORY (INHALATION)
Status: DISCONTINUED | OUTPATIENT
Start: 2024-02-02 | End: 2024-02-02

## 2024-02-02 RX ORDER — LANOLIN ALCOHOL/MO/W.PET/CERES
400 CREAM (GRAM) TOPICAL DAILY
Status: DISCONTINUED | OUTPATIENT
Start: 2024-02-02 | End: 2024-02-06 | Stop reason: HOSPADM

## 2024-02-02 RX ORDER — SODIUM CHLORIDE 0.9 % (FLUSH) 0.9 %
5-40 SYRINGE (ML) INJECTION EVERY 12 HOURS SCHEDULED
Status: DISCONTINUED | OUTPATIENT
Start: 2024-02-02 | End: 2024-02-06 | Stop reason: HOSPADM

## 2024-02-02 RX ORDER — ATORVASTATIN CALCIUM 40 MG/1
40 TABLET, FILM COATED ORAL NIGHTLY
Status: DISCONTINUED | OUTPATIENT
Start: 2024-02-02 | End: 2024-02-06 | Stop reason: HOSPADM

## 2024-02-02 RX ORDER — BUDESONIDE AND FORMOTEROL FUMARATE DIHYDRATE 160; 4.5 UG/1; UG/1
2 AEROSOL RESPIRATORY (INHALATION)
Status: DISCONTINUED | OUTPATIENT
Start: 2024-02-03 | End: 2024-02-06 | Stop reason: HOSPADM

## 2024-02-02 RX ORDER — BUSPIRONE HYDROCHLORIDE 5 MG/1
5 TABLET ORAL EVERY 8 HOURS PRN
Status: DISCONTINUED | OUTPATIENT
Start: 2024-02-02 | End: 2024-02-06 | Stop reason: HOSPADM

## 2024-02-02 RX ADMIN — ROFLUMILAST 500 MCG: 500 TABLET ORAL at 21:31

## 2024-02-02 RX ADMIN — SODIUM CHLORIDE: 9 INJECTION, SOLUTION INTRAVENOUS at 18:55

## 2024-02-02 RX ADMIN — METOPROLOL TARTRATE 25 MG: 25 TABLET, FILM COATED ORAL at 21:24

## 2024-02-02 RX ADMIN — Medication 400 MG: at 21:24

## 2024-02-02 RX ADMIN — Medication 2 PUFF: at 20:35

## 2024-02-02 RX ADMIN — DESMOPRESSIN ACETATE 40 MG: 0.2 TABLET ORAL at 21:24

## 2024-02-02 RX ADMIN — IOPAMIDOL 75 ML: 755 INJECTION, SOLUTION INTRAVENOUS at 14:54

## 2024-02-02 RX ADMIN — GUAIFENESIN 600 MG: 600 TABLET, EXTENDED RELEASE ORAL at 21:24

## 2024-02-02 ASSESSMENT — PAIN SCALES - GENERAL: PAINLEVEL_OUTOF10: 4

## 2024-02-02 ASSESSMENT — PAIN DESCRIPTION - LOCATION: LOCATION: ABDOMEN;BACK

## 2024-02-02 ASSESSMENT — PAIN - FUNCTIONAL ASSESSMENT: PAIN_FUNCTIONAL_ASSESSMENT: 0-10

## 2024-02-02 NOTE — DISCHARGE INSTRUCTIONS
Heart Failure Resources:  Heart Failure Interactive Workbook:  Go to https://[employer].Vatgia.com/publication/?o=392257 for a Free Heart Failure Interactive Workbook provided by The American Heart Association. This interactive workbook will provide information on Healthier Living with Heart Failure. Please copy and paste link into search bar. Use your mouse to scroll through the pages.    HF [place] whitney:   Heart Failure Free smart phone whitney available for iPhone and Android download. Use your phone to track sodium intake, fluid intake, symptoms, and weight.     Low Sodium Diet / Recipes:  Go to www.[employer].Responde Ai website for “renal” diet which is Low Sodium! [employer] is a dialysis company, but this website offers free seasonal cookbooks. Each quarter, they will release 25-30 new recipes with a breakdown of calories, sodium, and glucose. You can also go to wwwFOBO/recipes website for free recipes.     Discharge Instruction Video:  Scan the QR code below with your camera and click the canva.com link to open the video and watch educational information on Heart Failure and Medications from one of our nurses.   [email or web address]/[email or web address]/edit    Home Exercise Program:   Identification of Green/Yellow/Red zones:  You should be able to identify when you feel good (green zone), if you have 1-2 symptoms of HF (yellow zone), or if you are in need of medical attention (red zone).  In your CHF education folder you were provided a “stop light tool” to outline this information.     We want to you to rate your exertion levels:    Our therapy team has discussed means of identification with you such as the \"Michelle scale.\"  The Michelle rating scale ranges from 6 to 20, where 6 means \"no exertion at all\" and 20 means \"maximal exertion.\" The goal is to use this to gauge how much effort it is taking for you to do your normal daily tasks.   You should be able to recognize when too much

## 2024-02-02 NOTE — ED PROVIDER NOTES
I independently examined and evaluated Terri Smith.  I personally saw the patient and performed a substantive portion of the visit including all aspects of the medical decision making.    In brief, this 81-year-old female is presenting from her nursing facility due to low hemoglobin level, generalized fatigue, and she suspects bloody stool.  Patient states that she was told by her nurse that her stool was bloody, however the patient has poor vision and is uncertain for herself.  Denies any current abdominal pain, nausea or vomiting.    Focused exam revealed   General: Alert, no acute distress, patient resting comfortably   Skin: warm, intact, no pallor noted   Head: Normocephalic, atraumatic   Eye: Normal conjunctiva   Cardiac: Normal peripheral perfusion  Respiratory: No acute distress   Musculoskeletal: No deformity, full ROM.   Neurological: alert and oriented, normal sensory and motor observed.   Psychiatric: Cooperative    ED course: Labwork obtained and does confirm anemia with a 3 g drop in less than a month.  She is on Eliquis.  CT abdomen pelvis does show rectal wall thickening, but no other acute process.  Chest x-ray shows no acute disease.  Patient given 1 unit PRBC and will be admitted for further evaluation due to her GI bleed, severe anemia, and anticoagulant use.    ECG    The Ekg interpreted by me shows sinus rhythm with PACs and a rate of 99 bpm.  Left axis deviation.  No acute injury pattern.  , QRS 80, QTc 449.    All diagnostic, treatment, and disposition decisions were made by myself in conjunction with the advanced practice provider/resident.    I personally saw the patient and made/approved the management plan and take responsibility for the patient management.     For all further details of the patient's emergency department visit, please see the advanced practice provider's/resident's documentation.    Comment: Please note this report has been produced using speech recognition

## 2024-02-02 NOTE — FLOWSHEET NOTE
02/02/24 1745   Vital Signs   Temp 98.8 °F (37.1 °C)   Temp Source Oral   Pulse 75   Heart Rate Source Monitor   Respirations 18   /73   MAP (Calculated) 83   Pain Assessment   Pain Assessment None - Denies Pain   Care Plan - Pain Goals   Verbalizes/displays adequate comfort level or baseline comfort level Encourage patient to monitor pain and request assistance;Assess pain using appropriate pain scale;Administer analgesics based on type and severity of pain and evaluate response;Implement non-pharmacological measures as appropriate and evaluate response;Consider cultural and social influences on pain and pain management;Notify Licensed Independent Practitioner if interventions unsuccessful or patient reports new pain   Opioid-Induced Sedation   POSS Score 1   Oxygen Therapy   SpO2 98 %   O2 Device Nasal cannula   O2 Flow Rate (L/min) 2 L/min     Pt arrived to unit via stetcher/w/c. Alert and oriented. Reviewed plan of care. Oriented to room and unit. Admission assessment complete.     Patient is able to demonstrate the ability to move from a reclining position to an upright position within the recliner.         4 Eyes Skin Assessment     The patient is being assess for   Admission    I agree that 2 RN's have performed a thorough Head to Toe Skin Assessment on the patient. ALL assessment sites listed below have been assessed.      Areas assessed by both nurses:   [x]   Head, Face, and Ears   [x]   Shoulders, Back, and Chest, Abdomen  [x]   Arms, Elbows, and Hands   [x]   Coccyx, Sacrum, and Ischium  [x]   Legs, Feet, and Heels        No skin breakdown    **SHARE this note so that the co-signing nurse is able to place an eSignature**    Co-signer eSignature: Electronically signed by Kayy Scales RN on 2/2/24 at 6:22 PM EST    Does the Patient have Skin Breakdown?  No          Trey Prevention initiated:  No   Wound Care Orders initiated:  No      [facility] nurse consulted for Pressure Injury (Stage 3,4,

## 2024-02-02 NOTE — ED PROVIDER NOTES
[facility] [place] [address] [place] MEDICAL-SURGICAL  EMERGENCY DEPARTMENT ENCOUNTER      I am the Primary Clinician of Record    Note started: 1:58 PM EST 2/2/24     I have seen and evaluated this patient with my supervising physician Charan Hernandez*.        Pt Name: Terri Smith  MRN: [id number]  Birthdate 1940  Dateof evaluation: 2/2/2024  Provider: Mary Ching, APRN - CNP  PCP: Jackson Ontiveros MD  ED Attending: No att. providers found      CHIEF COMPLAINT       Chief Complaint   Patient presents with    Abdominal Pain     Patient reports back and abdominal pain that started last night. Reports nausea this AM. Squad states \"she is getting over COVID.\" Unsure of initial diagnosis. Wear O2 @ 2L baseline.    Abnormal Lab     Hgb 6.5 today and it was 8.7 on 1/22.        HISTORY OF PRESENTILLNESS   (Location/Symptom, Timing/Onset, Context/Setting, Quality, Duration, Modifying Factors, Severity)  Note limiting factors.     Terri Smith is a 83 y.o. female for concerns for low hemoglobin. Onset was today.  Context includes patient reports that she is currently living in the nursing home for rehab.  Patient states that she uses oxygen at home at baseline.  She reports that she was sent into the ED today because her hemoglobin was low at the nursing home.  Patient reports to me that she is a history of hemorrhoids however has been using suppositories and the nurses in the nursing home report that she is not any bleeding.  Patient denies any chest pain or shortness of breath.  Per EMS report patient did complain of abdominal pain and route.  Patient does state that she recently had a uterine infection and had a \"D&C \".  Patient denies any fevers but has had chills.  She does report nausea. Alleviating factors include nothing.  Aggravating factors include nothing. Pain is 4/10.  Nothing has been used for pain today.     Nursing Notes were all reviewed and agreed with or any disagreements were

## 2024-02-02 NOTE — H&P
Hospital Medicine History & Physical      PCP: Jackson Ontiveros MD    Date of Admission: 2/2/2024    Date of Service: Pt seen/examined on 02/02/24      Chief Complaint:    Chief Complaint   Patient presents with    Abdominal Pain     Patient reports back and abdominal pain that started last night. Reports nausea this AM. Squad states \"she is getting over COVID.\" Unsure of initial diagnosis. Wear O2 @ 2L baseline.    Abnormal Lab     Hgb 6.5 today and it was 8.7 on 1/22.          History Of Present Illness:      The patient is a 83 y.o. female with PMH of CKD, CAD, s/p CABG, CHF, COPD, HLD, HTN, Chronic hypoxic respiratory failure  who presents to [facility] with abdominal pain and abnormal lab.  Pt stated that she had some nausea and abdominal pain today,   mostly mid epigastric pain.  She is unsure if she had any BRBPR.  She has been using suppositories for her hemorrhoids.  She stated occasional nausea, no vomiting.  Denies any diarrhea or constipation. She is somewhat a poor historian.  She stated she is legally blind and can not see when she wipes.  She currently is in SNF for rehab post COVID.  She has her AVAPs machine at the SNF.  She currently denies any chest pain, increased shortness of breath, palpitations, or dysuria. History obtained from the patient and review of EMR.     Past Medical History:        Diagnosis Date    NADJA (acute kidney injury) ([facility])     Asthma     Atrial fibrillation with RVR ([facility])     CAD (coronary artery disease)     CHF (congestive heart failure) ([facility])     unsure    Chronic anxiety     COPD (chronic obstructive pulmonary disease) ([facility])     COPD (chronic obstructive pulmonary disease) ([facility]) 08/19/2023    GERD (gastroesophageal reflux disease) 09/09/2021    Heart attack ([facility]) 2019    History of blood transfusion     Hyperlipidemia     Hypertension     MRSA (methicillin resistant staph aureus) culture positive 09/22/2019    + resp cx    OA (osteoarthritis) 08/12/2014    Thyroid

## 2024-02-03 PROBLEM — Z87.19 HISTORY OF GI BLEED: Status: ACTIVE | Noted: 2024-02-03

## 2024-02-03 PROBLEM — Z79.01 ANTICOAGULATED: Status: ACTIVE | Noted: 2024-02-03

## 2024-02-03 LAB
ANION GAP SERPL CALCULATED.3IONS-SCNC: 7 MMOL/L (ref 3–16)
APTT BLD: 47 SEC (ref 22.7–35.9)
BUN SERPL-MCNC: 13 MG/DL (ref 7–20)
CALCIUM SERPL-MCNC: 9.1 MG/DL (ref 8.3–10.6)
CHLORIDE SERPL-SCNC: 106 MMOL/L (ref 99–110)
CO2 SERPL-SCNC: 29 MMOL/L (ref 21–32)
CREAT SERPL-MCNC: 0.9 MG/DL (ref 0.6–1.2)
GFR SERPLBLD CREATININE-BSD FMLA CKD-EPI: >60 ML/MIN/{1.73_M2}
GLUCOSE BLD-MCNC: 87 MG/DL (ref 70–99)
GLUCOSE BLD-MCNC: 97 MG/DL (ref 70–99)
GLUCOSE SERPL-MCNC: 91 MG/DL (ref 70–99)
HCT VFR BLD AUTO: 25.1 % (ref 36–48)
HCT VFR BLD AUTO: 27.1 % (ref 36–48)
HCT VFR BLD AUTO: 28.6 % (ref 36–48)
HGB BLD-MCNC: 9 G/DL (ref 12–16)
HGB BLD-MCNC: 9.1 G/DL (ref 12–16)
HGB BLD-MCNC: 9.3 G/DL (ref 12–16)
INR PPP: 1.05 (ref 0.84–1.16)
IRON SATN MFR SERPL: 6 % (ref 15–50)
IRON SERPL-MCNC: 14 UG/DL (ref 37–145)
PERFORMED ON: NORMAL
PERFORMED ON: NORMAL
POTASSIUM SERPL-SCNC: 4.8 MMOL/L (ref 3.5–5.1)
PROTHROMBIN TIME: 13.7 SEC (ref 11.5–14.8)
SODIUM SERPL-SCNC: 142 MMOL/L (ref 136–145)
TIBC SERPL-MCNC: 249 UG/DL (ref 260–445)

## 2024-02-03 PROCEDURE — C9113 INJ PANTOPRAZOLE SODIUM, VIA: HCPCS | Performed by: NURSE PRACTITIONER

## 2024-02-03 PROCEDURE — 94761 N-INVAS EAR/PLS OXIMETRY MLT: CPT

## 2024-02-03 PROCEDURE — 80048 BASIC METABOLIC PNL TOTAL CA: CPT

## 2024-02-03 PROCEDURE — 2580000003 HC RX 258: Performed by: NURSE PRACTITIONER

## 2024-02-03 PROCEDURE — 85018 HEMOGLOBIN: CPT

## 2024-02-03 PROCEDURE — 99232 SBSQ HOSP IP/OBS MODERATE 35: CPT | Performed by: INTERNAL MEDICINE

## 2024-02-03 PROCEDURE — 85610 PROTHROMBIN TIME: CPT

## 2024-02-03 PROCEDURE — 6360000002 HC RX W HCPCS: Performed by: NURSE PRACTITIONER

## 2024-02-03 PROCEDURE — 1200000000 HC SEMI PRIVATE

## 2024-02-03 PROCEDURE — 85730 THROMBOPLASTIN TIME PARTIAL: CPT

## 2024-02-03 PROCEDURE — 6370000000 HC RX 637 (ALT 250 FOR IP): Performed by: NURSE PRACTITIONER

## 2024-02-03 PROCEDURE — 94660 CPAP INITIATION&MGMT: CPT

## 2024-02-03 PROCEDURE — 6370000000 HC RX 637 (ALT 250 FOR IP): Performed by: INTERNAL MEDICINE

## 2024-02-03 PROCEDURE — 85014 HEMATOCRIT: CPT

## 2024-02-03 PROCEDURE — 2700000000 HC OXYGEN THERAPY PER DAY

## 2024-02-03 PROCEDURE — 36415 COLL VENOUS BLD VENIPUNCTURE: CPT

## 2024-02-03 PROCEDURE — 94640 AIRWAY INHALATION TREATMENT: CPT

## 2024-02-03 PROCEDURE — A4216 STERILE WATER/SALINE, 10 ML: HCPCS | Performed by: NURSE PRACTITIONER

## 2024-02-03 RX ORDER — DOCUSATE SODIUM 100 MG/1
100 CAPSULE, LIQUID FILLED ORAL DAILY
Status: DISCONTINUED | OUTPATIENT
Start: 2024-02-03 | End: 2024-02-06 | Stop reason: HOSPADM

## 2024-02-03 RX ORDER — POLYETHYLENE GLYCOL 3350 17 G/17G
17 POWDER, FOR SOLUTION ORAL DAILY
Status: DISCONTINUED | OUTPATIENT
Start: 2024-02-03 | End: 2024-02-06 | Stop reason: HOSPADM

## 2024-02-03 RX ADMIN — FERROUS SULFATE TAB 325 MG (65 MG ELEMENTAL FE) 325 MG: 325 (65 FE) TAB at 08:20

## 2024-02-03 RX ADMIN — ALBUTEROL SULFATE 2.5 MG: 2.5 SOLUTION RESPIRATORY (INHALATION) at 23:44

## 2024-02-03 RX ADMIN — SODIUM CHLORIDE: 9 INJECTION, SOLUTION INTRAVENOUS at 17:36

## 2024-02-03 RX ADMIN — ONDANSETRON 4 MG: 4 TABLET, ORALLY DISINTEGRATING ORAL at 23:05

## 2024-02-03 RX ADMIN — PANTOPRAZOLE SODIUM 40 MG: 40 INJECTION, POWDER, FOR SOLUTION INTRAVENOUS at 10:19

## 2024-02-03 RX ADMIN — BUSPIRONE HYDROCHLORIDE 5 MG: 5 TABLET ORAL at 23:05

## 2024-02-03 RX ADMIN — Medication 2 PUFF: at 07:54

## 2024-02-03 RX ADMIN — ACETAMINOPHEN 650 MG: 325 TABLET ORAL at 23:05

## 2024-02-03 RX ADMIN — Medication 2 PUFF: at 20:06

## 2024-02-03 RX ADMIN — Medication 1 CAPSULE: at 08:20

## 2024-02-03 RX ADMIN — POLYETHYLENE GLYCOL (3350) 17 G: 17 POWDER, FOR SOLUTION ORAL at 11:05

## 2024-02-03 RX ADMIN — Medication 400 MG: at 10:19

## 2024-02-03 RX ADMIN — ROFLUMILAST 500 MCG: 500 TABLET ORAL at 10:19

## 2024-02-03 RX ADMIN — GUAIFENESIN 600 MG: 600 TABLET, EXTENDED RELEASE ORAL at 10:19

## 2024-02-03 RX ADMIN — PSYLLIUM HUSK 1 PACKET: 3.4 POWDER ORAL at 10:24

## 2024-02-03 RX ADMIN — TIOTROPIUM BROMIDE INHALATION SPRAY 2 PUFF: 3.12 SPRAY, METERED RESPIRATORY (INHALATION) at 07:54

## 2024-02-03 RX ADMIN — DOCUSATE SODIUM 100 MG: 100 CAPSULE, LIQUID FILLED ORAL at 11:05

## 2024-02-03 RX ADMIN — METOPROLOL TARTRATE 25 MG: 25 TABLET, FILM COATED ORAL at 20:18

## 2024-02-03 RX ADMIN — DESMOPRESSIN ACETATE 40 MG: 0.2 TABLET ORAL at 20:18

## 2024-02-03 RX ADMIN — ONDANSETRON 4 MG: 4 TABLET, ORALLY DISINTEGRATING ORAL at 00:02

## 2024-02-03 NOTE — CONSULTS
Pharmacy unable to assist with Home med rec/ medication list.  Patient's records sent from ED to pharmacy are illegible.  ED staff contacted to inform them the medication pages are not legible aprox 1700 this evening and to please send a new copy or contact patient's facility for a legible copy.  If a new copy is obtained please inform and send to pharmacy for assistance if needed.  Thank you  Ori Whitfield RPH PharmD 2/2/2024 8:59 PM        
ALKPHOS 91 02/02/2024    BILIDIR <0.2 09/19/2019    PROT 6.7 02/02/2024    LABALBU 3.4 02/02/2024    INR 1.05 02/03/2024    LIPASE 58.0 02/02/2024     Lab Results   Component Value Date    WBC 8.9 02/02/2024    HGB 9.3 (L) 02/03/2024    HCT 28.6 (L) 02/03/2024    MCV 88.1 02/02/2024     (H) 02/02/2024     Lab Results   Component Value Date    CREATININE 0.9 02/03/2024    BUN 13 02/03/2024     02/03/2024    K 4.8 02/03/2024     02/03/2024    CO2 29 02/03/2024       IMAGES  CT ABDOMEN PELVIS W IV CONTRAST Additional Contrast? None    Result Date: 2/2/2024  Mild rectal wall thickening.  Negative for obstruction.     XR CHEST (2 VW)    Result Date: 2/2/2024  No radiographic evidence of acute pulmonary disease.           Assessment:  83 year old female with past medical history of HTN, COPD w/ chronic O2 dependence (2-3L via NC), CAD (s/p CABG 9/2019), CHF, CKD3, DM2, pAfib on eliquis who presents to the ER with abdominal pain and anemia on outpatient blood work.    Acute on chronic normocytic anemia.  There have been no overt signs of bleeding here, although unclear if she was having any prior to admission (she is blind).  She is on oral iron.  Stool occult was negative.  EGD 1/2023 - esophagitis  Colonoscopy/ flex sig 8/2023 and 9/2032 show hemorrhoids    Abdominal pain. Resolved after she had a bowel movement.    3. Rectal wall thickening on CT.    Plan:  1. Monitor for overt bleeding. Hold oral iron for now as to not confused with true melena.  2. Repeat CBC in AM.  3. Iron studies may be skewed following transfusion but we will check these.  4. PPI daily.  5. Miralax, colace for constipation.  6. Suspect CT findings related to constipation.  Pending clinical course will consider EGD and/or flex Monday or Tuesday.  7. Okay for diet from GI standpoint.        Thank you for permitting us to participate in the care of your patient. Please do not hesitate to call with questions or concerns.     1.  The

## 2024-02-03 NOTE — CARE COORDINATION
Case Management Assessment  Initial Evaluation    Date/Time of Evaluation: 2/3/2024 11:09 AM  Assessment Completed by: Kathy Garcia    If patient is discharged prior to next notation, then this note serves as note for discharge by case management.    Patient Name: Terri Smith                   YOB: 1940  Diagnosis: Oxygen dependent [Z99.81]  Anemia, unspecified type [D64.9]  Acute on chronic anemia [D64.9]                   Date / Time: 2/2/2024 12:42 PM    Patient Admission Status: Inpatient   Readmission Risk (Low < 19, Mod (19-27), High > 27): Readmission Risk Score: 36    Current PCP: Jackson Ontiveros MD  PCP verified by CM? Yes ([place])    Chart Reviewed: Yes      History Provided by: Patient  Patient Orientation: Alert and Oriented    Patient Cognition: Alert    Hospitalization in the last 30 days (Readmission):  Yes    If yes, Readmission Assessment in CM Navigator will be completed.    Advance Directives:      Code Status: Full Code   Patient's Primary Decision Maker is: Patient Declined (Legal Next of Kin Remains as Decision Maker)    Primary Decision Maker: Juan Smith - Child - [phone]    Secondary Decision Maker: Hafsa Tapia - Niece/Nephew - [phone]    Discharge Planning:    Patient lives with: Other (Comment) ([facility]) Type of Home: Skilled Nursing Facility  Primary Care Giver: Other (Comment) ([facility])  Patient Support Systems include: Children   Current Financial resources: Medicare  Current community resources: None  Current services prior to admission: Skilled Nursing Facility, Durable Medical Equipment            Current DME: Cane, Walker            Type of Home Care services:  None    ADLS  Prior functional level: Assistance with the following:, Dressing, Bathing, Toileting, Cooking, Housework, Shopping, Mobility  Current functional level: Bathing, Dressing, Toileting, Assistance with the following:, Cooking, Housework, Shopping, Mobility    PT AM-PAC:

## 2024-02-03 NOTE — FLOWSHEET NOTE
Assessment complete. Pt resting in bed, A/O x4. VS attached. O2 97 on 2L via NC.   Bed locked and in lowest position for pt safety. IVF infusing per order. Call light and bedside table within reach. Meds passed; see MAR   02/02/24 1939   Vital Signs   Temp 97.9 °F (36.6 °C)   Temp Source Oral   Pulse 66   Heart Rate Source Monitor   Respirations 16   BP (!) 100/49   MAP (Calculated) 66   BP Location Right upper arm   BP Method Automatic   Patient Position Semi fowlers   Pain Assessment   Pain Assessment None - Denies Pain   Oxygen Therapy   SpO2 97 %   O2 Device Nasal cannula   O2 Flow Rate (L/min) 2 L/min

## 2024-02-04 LAB
ANION GAP SERPL CALCULATED.3IONS-SCNC: 8 MMOL/L (ref 3–16)
BUN SERPL-MCNC: 13 MG/DL (ref 7–20)
CALCIUM SERPL-MCNC: 8.9 MG/DL (ref 8.3–10.6)
CHLORIDE SERPL-SCNC: 107 MMOL/L (ref 99–110)
CO2 SERPL-SCNC: 26 MMOL/L (ref 21–32)
CREAT SERPL-MCNC: 0.8 MG/DL (ref 0.6–1.2)
EST. AVERAGE GLUCOSE BLD GHB EST-MCNC: 105.4 MG/DL
GFR SERPLBLD CREATININE-BSD FMLA CKD-EPI: >60 ML/MIN/{1.73_M2}
GLUCOSE SERPL-MCNC: 90 MG/DL (ref 70–99)
HBA1C MFR BLD: 5.3 %
HCT VFR BLD AUTO: 25.4 % (ref 36–48)
HCT VFR BLD AUTO: 29.8 % (ref 36–48)
HGB BLD-MCNC: 10 G/DL (ref 12–16)
HGB BLD-MCNC: 8.9 G/DL (ref 12–16)
POTASSIUM SERPL-SCNC: 3.9 MMOL/L (ref 3.5–5.1)
SODIUM SERPL-SCNC: 141 MMOL/L (ref 136–145)

## 2024-02-04 PROCEDURE — 99232 SBSQ HOSP IP/OBS MODERATE 35: CPT | Performed by: INTERNAL MEDICINE

## 2024-02-04 PROCEDURE — 85018 HEMOGLOBIN: CPT

## 2024-02-04 PROCEDURE — 6360000002 HC RX W HCPCS: Performed by: NURSE PRACTITIONER

## 2024-02-04 PROCEDURE — 94640 AIRWAY INHALATION TREATMENT: CPT

## 2024-02-04 PROCEDURE — 6370000000 HC RX 637 (ALT 250 FOR IP): Performed by: NURSE PRACTITIONER

## 2024-02-04 PROCEDURE — A4216 STERILE WATER/SALINE, 10 ML: HCPCS | Performed by: NURSE PRACTITIONER

## 2024-02-04 PROCEDURE — 36415 COLL VENOUS BLD VENIPUNCTURE: CPT

## 2024-02-04 PROCEDURE — 2580000003 HC RX 258: Performed by: NURSE PRACTITIONER

## 2024-02-04 PROCEDURE — 85014 HEMATOCRIT: CPT

## 2024-02-04 PROCEDURE — 80048 BASIC METABOLIC PNL TOTAL CA: CPT

## 2024-02-04 PROCEDURE — 1200000000 HC SEMI PRIVATE

## 2024-02-04 PROCEDURE — 94660 CPAP INITIATION&MGMT: CPT

## 2024-02-04 PROCEDURE — 2700000000 HC OXYGEN THERAPY PER DAY

## 2024-02-04 PROCEDURE — 94761 N-INVAS EAR/PLS OXIMETRY MLT: CPT

## 2024-02-04 PROCEDURE — C9113 INJ PANTOPRAZOLE SODIUM, VIA: HCPCS | Performed by: NURSE PRACTITIONER

## 2024-02-04 RX ORDER — LORAZEPAM 2 MG/ML
0.5 INJECTION INTRAMUSCULAR ONCE
Status: COMPLETED | OUTPATIENT
Start: 2024-02-04 | End: 2024-02-04

## 2024-02-04 RX ADMIN — LORAZEPAM 0.5 MG: 2 INJECTION INTRAMUSCULAR; INTRAVENOUS at 03:28

## 2024-02-04 RX ADMIN — Medication 2 PUFF: at 07:45

## 2024-02-04 RX ADMIN — SODIUM CHLORIDE, PRESERVATIVE FREE 10 ML: 5 INJECTION INTRAVENOUS at 22:28

## 2024-02-04 RX ADMIN — ALBUTEROL SULFATE 2.5 MG: 2.5 SOLUTION RESPIRATORY (INHALATION) at 18:03

## 2024-02-04 RX ADMIN — Medication 2 PUFF: at 19:42

## 2024-02-04 RX ADMIN — DESMOPRESSIN ACETATE 40 MG: 0.2 TABLET ORAL at 22:28

## 2024-02-04 RX ADMIN — ACETAMINOPHEN 650 MG: 325 TABLET ORAL at 16:28

## 2024-02-04 RX ADMIN — SODIUM CHLORIDE: 9 INJECTION, SOLUTION INTRAVENOUS at 12:35

## 2024-02-04 RX ADMIN — Medication 400 MG: at 08:45

## 2024-02-04 RX ADMIN — GUAIFENESIN 600 MG: 600 TABLET, EXTENDED RELEASE ORAL at 08:46

## 2024-02-04 RX ADMIN — DOCUSATE SODIUM 100 MG: 100 CAPSULE, LIQUID FILLED ORAL at 08:46

## 2024-02-04 RX ADMIN — ROFLUMILAST 500 MCG: 500 TABLET ORAL at 08:46

## 2024-02-04 RX ADMIN — TIOTROPIUM BROMIDE INHALATION SPRAY 2 PUFF: 3.12 SPRAY, METERED RESPIRATORY (INHALATION) at 07:44

## 2024-02-04 RX ADMIN — ALBUTEROL SULFATE 2.5 MG: 2.5 SOLUTION RESPIRATORY (INHALATION) at 13:30

## 2024-02-04 RX ADMIN — METOPROLOL TARTRATE 25 MG: 25 TABLET, FILM COATED ORAL at 22:28

## 2024-02-04 RX ADMIN — ONDANSETRON 4 MG: 2 INJECTION INTRAMUSCULAR; INTRAVENOUS at 12:31

## 2024-02-04 RX ADMIN — PANTOPRAZOLE SODIUM 40 MG: 40 INJECTION, POWDER, FOR SOLUTION INTRAVENOUS at 08:45

## 2024-02-04 RX ADMIN — METOPROLOL TARTRATE 25 MG: 25 TABLET, FILM COATED ORAL at 08:45

## 2024-02-04 RX ADMIN — Medication 1 CAPSULE: at 07:57

## 2024-02-04 ASSESSMENT — PAIN SCALES - GENERAL
PAINLEVEL_OUTOF10: 4
PAINLEVEL_OUTOF10: 8

## 2024-02-04 ASSESSMENT — PAIN DESCRIPTION - DESCRIPTORS: DESCRIPTORS: ACHING

## 2024-02-04 ASSESSMENT — PAIN DESCRIPTION - LOCATION: LOCATION: BACK

## 2024-02-04 ASSESSMENT — PAIN DESCRIPTION - ORIENTATION: ORIENTATION: MID;LOWER

## 2024-02-04 NOTE — FLOWSHEET NOTE
02/03/24 1928   Handoff   Communication Given Shift Handoff   Handoff Given To Mena   Handoff Received From Darci   Handoff Communication Face to Face;At bedside   Time Handoff Given 1928   End of Shift Check Performed Yes

## 2024-02-05 ENCOUNTER — ANESTHESIA EVENT (OUTPATIENT)
Dept: ENDOSCOPY | Age: 84
DRG: 811 | End: 2024-02-05
Payer: MEDICARE

## 2024-02-05 ENCOUNTER — ANESTHESIA (OUTPATIENT)
Dept: ENDOSCOPY | Age: 84
DRG: 811 | End: 2024-02-05
Payer: MEDICARE

## 2024-02-05 LAB
ANION GAP SERPL CALCULATED.3IONS-SCNC: 7 MMOL/L (ref 3–16)
BUN SERPL-MCNC: 8 MG/DL (ref 7–20)
CALCIUM SERPL-MCNC: 8.3 MG/DL (ref 8.3–10.6)
CHLORIDE SERPL-SCNC: 105 MMOL/L (ref 99–110)
CO2 SERPL-SCNC: 25 MMOL/L (ref 21–32)
CREAT SERPL-MCNC: 0.7 MG/DL (ref 0.6–1.2)
FERRITIN SERPL IA-MCNC: 47.3 NG/ML (ref 15–150)
GFR SERPLBLD CREATININE-BSD FMLA CKD-EPI: >60 ML/MIN/{1.73_M2}
GLUCOSE BLD-MCNC: 97 MG/DL (ref 70–99)
GLUCOSE SERPL-MCNC: 95 MG/DL (ref 70–99)
PERFORMED ON: NORMAL
POTASSIUM SERPL-SCNC: 3.7 MMOL/L (ref 3.5–5.1)
SODIUM SERPL-SCNC: 137 MMOL/L (ref 136–145)

## 2024-02-05 PROCEDURE — 6370000000 HC RX 637 (ALT 250 FOR IP): Performed by: NURSE PRACTITIONER

## 2024-02-05 PROCEDURE — 80048 BASIC METABOLIC PNL TOTAL CA: CPT

## 2024-02-05 PROCEDURE — 36415 COLL VENOUS BLD VENIPUNCTURE: CPT

## 2024-02-05 PROCEDURE — 7100000011 HC PHASE II RECOVERY - ADDTL 15 MIN: Performed by: INTERNAL MEDICINE

## 2024-02-05 PROCEDURE — 6360000002 HC RX W HCPCS: Performed by: NURSE ANESTHETIST, CERTIFIED REGISTERED

## 2024-02-05 PROCEDURE — 3609012400 HC EGD TRANSORAL BIOPSY SINGLE/MULTIPLE: Performed by: INTERNAL MEDICINE

## 2024-02-05 PROCEDURE — 6360000002 HC RX W HCPCS: Performed by: NURSE PRACTITIONER

## 2024-02-05 PROCEDURE — 2500000003 HC RX 250 WO HCPCS: Performed by: NURSE ANESTHETIST, CERTIFIED REGISTERED

## 2024-02-05 PROCEDURE — 6370000000 HC RX 637 (ALT 250 FOR IP): Performed by: INTERNAL MEDICINE

## 2024-02-05 PROCEDURE — 1200000000 HC SEMI PRIVATE

## 2024-02-05 PROCEDURE — A4216 STERILE WATER/SALINE, 10 ML: HCPCS | Performed by: NURSE PRACTITIONER

## 2024-02-05 PROCEDURE — 2700000000 HC OXYGEN THERAPY PER DAY

## 2024-02-05 PROCEDURE — 0DB98ZX EXCISION OF DUODENUM, VIA NATURAL OR ARTIFICIAL OPENING ENDOSCOPIC, DIAGNOSTIC: ICD-10-PCS | Performed by: INTERNAL MEDICINE

## 2024-02-05 PROCEDURE — 7100000010 HC PHASE II RECOVERY - FIRST 15 MIN: Performed by: INTERNAL MEDICINE

## 2024-02-05 PROCEDURE — 2580000003 HC RX 258: Performed by: NURSE PRACTITIONER

## 2024-02-05 PROCEDURE — 3700000000 HC ANESTHESIA ATTENDED CARE: Performed by: INTERNAL MEDICINE

## 2024-02-05 PROCEDURE — 3700000001 HC ADD 15 MINUTES (ANESTHESIA): Performed by: INTERNAL MEDICINE

## 2024-02-05 PROCEDURE — 94761 N-INVAS EAR/PLS OXIMETRY MLT: CPT

## 2024-02-05 PROCEDURE — 82728 ASSAY OF FERRITIN: CPT

## 2024-02-05 PROCEDURE — 2709999900 HC NON-CHARGEABLE SUPPLY: Performed by: INTERNAL MEDICINE

## 2024-02-05 PROCEDURE — 94640 AIRWAY INHALATION TREATMENT: CPT

## 2024-02-05 PROCEDURE — 99232 SBSQ HOSP IP/OBS MODERATE 35: CPT | Performed by: INTERNAL MEDICINE

## 2024-02-05 PROCEDURE — 88305 TISSUE EXAM BY PATHOLOGIST: CPT

## 2024-02-05 PROCEDURE — C9113 INJ PANTOPRAZOLE SODIUM, VIA: HCPCS | Performed by: NURSE PRACTITIONER

## 2024-02-05 PROCEDURE — 94010 BREATHING CAPACITY TEST: CPT

## 2024-02-05 RX ORDER — PROPOFOL 10 MG/ML
INJECTION, EMULSION INTRAVENOUS PRN
Status: DISCONTINUED | OUTPATIENT
Start: 2024-02-05 | End: 2024-02-05 | Stop reason: SDUPTHER

## 2024-02-05 RX ORDER — TORSEMIDE 20 MG/1
20 TABLET ORAL DAILY
Status: DISCONTINUED | OUTPATIENT
Start: 2024-02-05 | End: 2024-02-06 | Stop reason: HOSPADM

## 2024-02-05 RX ORDER — LIDOCAINE HYDROCHLORIDE 20 MG/ML
INJECTION, SOLUTION INFILTRATION; PERINEURAL PRN
Status: DISCONTINUED | OUTPATIENT
Start: 2024-02-05 | End: 2024-02-05 | Stop reason: SDUPTHER

## 2024-02-05 RX ADMIN — POLYETHYLENE GLYCOL (3350) 17 G: 17 POWDER, FOR SOLUTION ORAL at 15:25

## 2024-02-05 RX ADMIN — PANTOPRAZOLE SODIUM 40 MG: 40 INJECTION, POWDER, FOR SOLUTION INTRAVENOUS at 15:23

## 2024-02-05 RX ADMIN — ACETAMINOPHEN 650 MG: 325 TABLET ORAL at 01:36

## 2024-02-05 RX ADMIN — ALBUTEROL SULFATE 2.5 MG: 2.5 SOLUTION RESPIRATORY (INHALATION) at 23:54

## 2024-02-05 RX ADMIN — METOPROLOL TARTRATE 25 MG: 25 TABLET, FILM COATED ORAL at 23:10

## 2024-02-05 RX ADMIN — BUSPIRONE HYDROCHLORIDE 5 MG: 5 TABLET ORAL at 18:49

## 2024-02-05 RX ADMIN — SODIUM CHLORIDE, PRESERVATIVE FREE 10 ML: 5 INJECTION INTRAVENOUS at 15:20

## 2024-02-05 RX ADMIN — TORSEMIDE 20 MG: 20 TABLET ORAL at 15:26

## 2024-02-05 RX ADMIN — Medication 2 PUFF: at 07:49

## 2024-02-05 RX ADMIN — PROPOFOL 50 MG: 10 INJECTION, EMULSION INTRAVENOUS at 12:38

## 2024-02-05 RX ADMIN — ACETAMINOPHEN 650 MG: 325 TABLET ORAL at 23:10

## 2024-02-05 RX ADMIN — ALBUTEROL SULFATE 2.5 MG: 2.5 SOLUTION RESPIRATORY (INHALATION) at 16:50

## 2024-02-05 RX ADMIN — PSYLLIUM HUSK 1 PACKET: 3.4 POWDER ORAL at 15:25

## 2024-02-05 RX ADMIN — Medication 1 CAPSULE: at 15:27

## 2024-02-05 RX ADMIN — DOCUSATE SODIUM 100 MG: 100 CAPSULE, LIQUID FILLED ORAL at 15:26

## 2024-02-05 RX ADMIN — ROFLUMILAST 500 MCG: 500 TABLET ORAL at 15:26

## 2024-02-05 RX ADMIN — DESMOPRESSIN ACETATE 40 MG: 0.2 TABLET ORAL at 23:10

## 2024-02-05 RX ADMIN — METOPROLOL TARTRATE 25 MG: 25 TABLET, FILM COATED ORAL at 15:26

## 2024-02-05 RX ADMIN — Medication 2 PUFF: at 20:00

## 2024-02-05 RX ADMIN — LIDOCAINE HYDROCHLORIDE 100 MG: 20 INJECTION, SOLUTION INFILTRATION; PERINEURAL at 12:38

## 2024-02-05 RX ADMIN — GUAIFENESIN 600 MG: 600 TABLET, EXTENDED RELEASE ORAL at 15:26

## 2024-02-05 RX ADMIN — TIOTROPIUM BROMIDE INHALATION SPRAY 2 PUFF: 3.12 SPRAY, METERED RESPIRATORY (INHALATION) at 07:49

## 2024-02-05 RX ADMIN — Medication 400 MG: at 15:27

## 2024-02-05 ASSESSMENT — COPD QUESTIONNAIRES
QUESTION1_COUGHFREQUENCY: 3
QUESTION5_HOMEACTIVITIES: 4
QUESTION2_CHESTPHLEGM: 3
QUESTION7_SLEEPQUALITY: 0
QUESTION8_ENERGYLEVEL: 5
QUESTION6_LEAVINGHOUSE: 5
QUESTION3_CHESTTIGHTNESS: 0
QUESTION4_WALKINCLINE: 5
CAT_TOTALSCORE: 25
CAT_SEVERITY: MODERATE

## 2024-02-05 ASSESSMENT — PAIN DESCRIPTION - DESCRIPTORS
DESCRIPTORS: OTHER (COMMENT)
DESCRIPTORS: ACHING

## 2024-02-05 ASSESSMENT — ENCOUNTER SYMPTOMS: SHORTNESS OF BREATH: 1

## 2024-02-05 ASSESSMENT — PAIN - FUNCTIONAL ASSESSMENT
PAIN_FUNCTIONAL_ASSESSMENT: ACTIVITIES ARE NOT PREVENTED
PAIN_FUNCTIONAL_ASSESSMENT: 0-10

## 2024-02-05 ASSESSMENT — PAIN DESCRIPTION - LOCATION: LOCATION: BACK

## 2024-02-05 ASSESSMENT — PAIN DESCRIPTION - ORIENTATION: ORIENTATION: MID

## 2024-02-05 ASSESSMENT — PAIN SCALES - GENERAL: PAINLEVEL_OUTOF10: 5

## 2024-02-05 ASSESSMENT — PAIN DESCRIPTION - PAIN TYPE: TYPE: ACUTE PAIN

## 2024-02-05 NOTE — OP NOTE
[address] [address]                                OPERATIVE REPORT    PATIENT NAME: SHARON HANEY                 :        1940  MED REC NO:   [id number]                          ROOM:  ACCOUNT NO:   [id number]                           ADMIT DATE: 2024  PROVIDER:     Celestine Lester MD    DATE OF PROCEDURE:  2024    PREPROCEDURE DIAGNOSIS:  Acute-on-chronic anemia.    PROCEDURE PERFORMED:  EGD with biopsy.    POSTPROCEDURE DIAGNOSES:  1.  Small hiatal hernia.  2.  Otherwise normal EGD.  3.  No active bleeding or source of anemia identified on this exam.    SURGEON:  Celestine Lester MD    PROCEDURE INDICATIONS:  An 83-year-old female with history of diabetes;  hypertension; hyperlipidemia; coronary artery disease, status post CABG;  CHF; chronic kidney disease; and atrial fibrillation, admitted with  acute-on-chronic anemia.  She denies any signs of bleeding.  She has  history of hemorrhoids per recent colonoscopy in 2023.    MEDICATIONS:  MAC per Anesthesia.    PROCEDURE IN DETAIL:  Informed consent obtained after discussing risks,  benefits, alternatives.  Full history and physical was performed.  The  patient was classified as ASA class III.  Medications were given by  Anesthesia.  Cardiopulmonary status was continuously monitored  throughout the procedure.  The patient was placed in left lateral  decubitus position.  Once adequately sedated, a standard upper  gastroscope was inserted in the mouth and advanced under direct  visualization to second portion of the duodenum.  The entire mucosa of  the esophagus, stomach (retroflex and forward views), duodenum (bulb,  sweep, and second portion) were examined carefully during withdrawal.   The patient tolerated the procedure well without any difficulties.    FINDINGS:  ESOPHAGUS:  The entire esophagus appeared normal.  There was no

## 2024-02-05 NOTE — FLOWSHEET NOTE
02/05/24 0845   Vital Signs   Temp 97.7 °F (36.5 °C)   Temp Source Oral   Pulse 99   Heart Rate Source Monitor   Respirations 20   /78   MAP (Calculated) 97   BP Location Right upper arm   BP Method Automatic   Patient Position High fowlers   Opioid-Induced Sedation   POSS Score 1   RASS   Love Agitation Sedation Scale (RASS) 0   Oxygen Therapy   SpO2 99 %   O2 Device Nasal cannula   O2 Flow Rate (L/min) 2 L/min     Patient NPO    Patient alert and oriented, family at bedside, respirs WNL, VSS, nurse assessment completed, call light and overbed table within easy reach, no other voiced concerns or needs at this time. See flowsheet and MAR. Deepika Snow RN ([place])

## 2024-02-05 NOTE — H&P
History and Physical / Pre-Sedation Assessment    Patient:  Terri Smith   :   1940     Intended Procedure:  EGD    HPI: 83 year old female with history of DM, HTN, HLD, CAD s/p CABG, CHF, CKD, A fib admitted with acute on chronic anemia.     Past Medical History:   Diagnosis Date    NADJA (acute kidney injury) (HCC)     Asthma     Atrial fibrillation with RVR (HCC)     CAD (coronary artery disease)     CHF (congestive heart failure) (HCC)     unsure    Chronic anxiety     COPD (chronic obstructive pulmonary disease) (HCC)     COPD (chronic obstructive pulmonary disease) (HCC) 2023    GERD (gastroesophageal reflux disease) 2021    Heart attack (HCC)     History of blood transfusion     Hyperlipidemia     Hypertension     MRSA (methicillin resistant staph aureus) culture positive 2019    + resp cx    OA (osteoarthritis) 2014    Thyroid disease      Past Surgical History:   Procedure Laterality Date    BRONCHOSCOPY  2019    Dr. Wheatley - w/BAL    CARDIAC CATHETERIZATION  2019    Dr. Palomares     SECTION      COLONOSCOPY N/A 2020    COLONOSCOPY DIAGNOSTIC performed by Rowdy Schmitz DO at [facility] ENDOSCOPY    COLONOSCOPY N/A 10/22/2021    COLONOSCOPY POLYPECTOMY SNARE/COLD BIOPSY performed by Celestine Lester MD at [facility] ENDOSCOPY    COLONOSCOPY N/A 2023    COLONOSCOPY POLYPECTOMY SNARE performed by Darron Langford MD at [facility] ENDOSCOPY    CORONARY ARTERY BYPASS GRAFT N/A 2019    OFF PUMP CORONARY ARTERY BYPASS GRAFTING X2, INTERNAL MAMMARY ARTERY, SAPHENOUS VEIN GRAFT performed by Yolanda Chase MD at [facility] CVOR    IR MIDLINE CATH  2021    IR MIDLINE CATH 2021 [facility] SPECIAL PROCEDURES    MASTECTOMY, PARTIAL Left     SIGMOIDOSCOPY  2020    4 bands    SIGMOIDOSCOPY N/A 2020    FLEX SIG W/ BANDING SIGMOIDOSCOPY DIAGNOSTIC FLEXIBLE performed by Rowdy Schmitz DO at [facility] ENDOSCOPY    SIGMOIDOSCOPY N/A

## 2024-02-05 NOTE — ANESTHESIA PRE PROCEDURE
Department of Anesthesiology  Preprocedure Note       Name:  Terri Smith   Age:  83 y.o.  :  1940                                          MRN:  [id number]         Date:  2024      Surgeon: Surgeon(s):  Celestine Lester MD    Procedure: Procedure(s):  EGD ESOPHAGOGASTRODUODENOSCOPY    Medications prior to admission:   Prior to Admission medications    Medication Sig Start Date End Date Taking? Authorizing Provider   sodium chloride (OCEAN, BABY AYR) 0.65 % nasal spray 1 spray by Nasal route as needed for Congestion 24  Benson Fish MD   amLODIPine (NORVASC) 10 MG tablet Take 1 tablet by mouth daily 24   Joyce Tesfaye MD   ELIQUIS 2.5 MG TABS tablet Take 1 tablet by mouth 2 times daily 10/2/23   Charmaine Gant MD   metoprolol tartrate (LOPRESSOR) 25 MG tablet Take 1 tablet by mouth in the morning and at bedtime 23   Nadiya Hernandez MD   aspirin 81 MG chewable tablet Take 1 tablet by mouth daily Resume ASA on Monday. Monitor for any recurrent GI bleed. 23   Moon Sena MD   lactobacillus (CULTURELLE) capsule Take 1 capsule by mouth daily (with breakfast) 23   Moon Sena MD   psyllium (METAMUCIL) 58.12 % PACK packet Take 1 packet by mouth daily  Patient not taking: Reported on 2/3/2024 9/2/23   Moon Sena MD   atorvastatin (LIPITOR) 40 MG tablet Take 1 tablet by mouth at bedtime    Charmaine Gant MD   busPIRone (BUSPAR) 5 MG tablet Take 1 tablet by mouth every 8 hours as needed (anxiety)    Charmaine Gant MD   magnesium oxide (MAG-OX) 400 (240 Mg) MG tablet Take 1 tablet by mouth daily 23   Daniela Guevara APRN   pantoprazole (PROTONIX) 40 MG tablet Take 1 tablet by mouth daily 23   Kayla Omalley APRN - CNP   torsemide (DEMADEX) 20 MG tablet Take 1 tablet by mouth daily    Charmaine Gant MD   Fluticasone-Umeclidin-Vilant (TRELEGY ELLIPTA) 200-62.5-25 MCG/INH

## 2024-02-05 NOTE — FLOWSHEET NOTE
02/04/24 1935   Handoff   Communication Given Shift Handoff   Handoff Given To Mena   Handoff Received From Darci   Handoff Communication Face to Face;At bedside   Time Handoff Given 1936   End of Shift Check Performed Yes

## 2024-02-05 NOTE — ANESTHESIA POSTPROCEDURE EVALUATION
Department of Anesthesiology  Postprocedure Note    Patient: Terri Smith  MRN: [id number]  YOB: 1940  Date of evaluation: 2/5/2024    Procedure Summary       Date: 02/05/24 Room / Location: [address]    Anesthesia Start: 1238 Anesthesia Stop: 1245    Procedure: EGD BIOPSY Diagnosis:       Anemia due to acute blood loss      (Anemia due to acute blood loss [D62])    Surgeons: Celestine Lester MD Responsible Provider: Ray Khoury MD    Anesthesia Type: MAC ASA Status: 3            Anesthesia Type: No value filed.    David Phase I: David Score: 9    David Phase II: David Score: 10    Anesthesia Post Evaluation    Patient location during evaluation: PACU  Level of consciousness: awake  Airway patency: patent  Nausea & Vomiting: no nausea  Cardiovascular status: blood pressure returned to baseline  Respiratory status: acceptable  Hydration status: euvolemic  Pain management: adequate    No notable events documented.

## 2024-02-05 NOTE — CARE COORDINATION
CM follow up    Pt from [facility] STR and can return. No cert needed. 3 MN requirement already met due to recent admission at [facility].   EGD today.   Will need PT/OT when able to participate.  +CM following

## 2024-02-05 NOTE — DISCHARGE INSTR - COC
"I was jogging and I rolled my ankle the wrong way and I'm having pain in my right knee." Report weight gain of 3 pounds/day or 5 pounds/week to : Facility MD.  Please use hospital discharge weight as baseline reference.   Please monitor for signs and symptoms of and report to MD:  Worsening Heart Failure: sudden weight gain, shortness of breath, lower extremity or general edema/swelling, abdominal bloating/swelling, inability to lie flat, intolerance to usual activity, or cough (especially at night). Report these finding even if no increase in weight.  Dehydration:  having difficulty or a decrease in urination, dizziness, worsening fatigue, or new onset/worsening of generalized weakness.     Please continue a LOW SODIUM diet and LIMIT fluid intake to 48 - 64 ounces ( 1.5 - 2 liters) per day.   Call Facility MD} with any questions or concerns.   Please continue heart failure education to patient and family/support system.  See After Visit Summary for hospital follow up appointment details.  Consider Spiritual Care Referral for support and/or completion of advance directives:   [facility] Spiritual Care Services: [phone]  Consider: having the facility MD complete required 7 day follow up.    COPD Instructions for Daily Management    Patient was treated for acute on chronic Chronic Obstructive Pulmonary Disease (COPD) exacerbation.  Terri Smith will require the following:  Please monitor for signs and symptoms of acute COPD exacerbation:   More coughing than usual  Wheezing  Increased mucus or a change in mucus color  Medications not helping or having to use them more often  + Worsening shortness of breath  Lower pulse oximetry levels with prescribed oxygen.   Call Facility MD with any questions or concerns.   Use Spacer/Chamber with inhaler.   Please continue COPD education to patient and family/support system.  See After Visit Summary for hospital follow up appointment details.  Consider Spiritual Care Referral for support and/or completion of advance directives:   [facility]

## 2024-02-06 VITALS
HEIGHT: 64 IN | DIASTOLIC BLOOD PRESSURE: 68 MMHG | WEIGHT: 103.1 LBS | SYSTOLIC BLOOD PRESSURE: 112 MMHG | HEART RATE: 107 BPM | BODY MASS INDEX: 17.6 KG/M2 | TEMPERATURE: 98 F | RESPIRATION RATE: 16 BRPM | OXYGEN SATURATION: 97 %

## 2024-02-06 LAB
HCT VFR BLD AUTO: 29.4 % (ref 36–48)
HGB BLD-MCNC: 9.8 G/DL (ref 12–16)

## 2024-02-06 PROCEDURE — 94640 AIRWAY INHALATION TREATMENT: CPT

## 2024-02-06 PROCEDURE — 6360000002 HC RX W HCPCS: Performed by: NURSE PRACTITIONER

## 2024-02-06 PROCEDURE — 6370000000 HC RX 637 (ALT 250 FOR IP): Performed by: NURSE PRACTITIONER

## 2024-02-06 PROCEDURE — 94761 N-INVAS EAR/PLS OXIMETRY MLT: CPT

## 2024-02-06 PROCEDURE — 2700000000 HC OXYGEN THERAPY PER DAY

## 2024-02-06 PROCEDURE — 36415 COLL VENOUS BLD VENIPUNCTURE: CPT

## 2024-02-06 PROCEDURE — A4216 STERILE WATER/SALINE, 10 ML: HCPCS | Performed by: NURSE PRACTITIONER

## 2024-02-06 PROCEDURE — 85018 HEMOGLOBIN: CPT

## 2024-02-06 PROCEDURE — 85014 HEMATOCRIT: CPT

## 2024-02-06 PROCEDURE — 97166 OT EVAL MOD COMPLEX 45 MIN: CPT

## 2024-02-06 PROCEDURE — 97161 PT EVAL LOW COMPLEX 20 MIN: CPT

## 2024-02-06 PROCEDURE — C9113 INJ PANTOPRAZOLE SODIUM, VIA: HCPCS | Performed by: NURSE PRACTITIONER

## 2024-02-06 PROCEDURE — 99239 HOSP IP/OBS DSCHRG MGMT >30: CPT | Performed by: INTERNAL MEDICINE

## 2024-02-06 PROCEDURE — 6370000000 HC RX 637 (ALT 250 FOR IP): Performed by: INTERNAL MEDICINE

## 2024-02-06 PROCEDURE — 2580000003 HC RX 258: Performed by: NURSE PRACTITIONER

## 2024-02-06 RX ORDER — PSEUDOEPHEDRINE HCL 30 MG
100 TABLET ORAL DAILY
Qty: 30 CAPSULE | Refills: 0
Start: 2024-02-06

## 2024-02-06 RX ORDER — AMLODIPINE BESYLATE 10 MG/1
5 TABLET ORAL DAILY
Qty: 30 TABLET | Refills: 3
Start: 2024-02-06

## 2024-02-06 RX ADMIN — GUAIFENESIN 600 MG: 600 TABLET, EXTENDED RELEASE ORAL at 09:38

## 2024-02-06 RX ADMIN — Medication 1 CAPSULE: at 07:40

## 2024-02-06 RX ADMIN — PSYLLIUM HUSK 1 PACKET: 3.4 POWDER ORAL at 09:39

## 2024-02-06 RX ADMIN — Medication 2 PUFF: at 07:42

## 2024-02-06 RX ADMIN — ACETAMINOPHEN 650 MG: 325 TABLET ORAL at 07:44

## 2024-02-06 RX ADMIN — SODIUM CHLORIDE, PRESERVATIVE FREE 10 ML: 5 INJECTION INTRAVENOUS at 09:49

## 2024-02-06 RX ADMIN — ALBUTEROL SULFATE 2.5 MG: 2.5 SOLUTION RESPIRATORY (INHALATION) at 07:41

## 2024-02-06 RX ADMIN — METOPROLOL TARTRATE 25 MG: 25 TABLET, FILM COATED ORAL at 09:39

## 2024-02-06 RX ADMIN — PANTOPRAZOLE SODIUM 40 MG: 40 INJECTION, POWDER, FOR SOLUTION INTRAVENOUS at 09:39

## 2024-02-06 RX ADMIN — TIOTROPIUM BROMIDE INHALATION SPRAY 2 PUFF: 3.12 SPRAY, METERED RESPIRATORY (INHALATION) at 07:42

## 2024-02-06 RX ADMIN — ROFLUMILAST 500 MCG: 500 TABLET ORAL at 09:39

## 2024-02-06 RX ADMIN — Medication 400 MG: at 09:39

## 2024-02-06 RX ADMIN — DOCUSATE SODIUM 100 MG: 100 CAPSULE, LIQUID FILLED ORAL at 09:38

## 2024-02-06 RX ADMIN — ALBUTEROL SULFATE 2.5 MG: 2.5 SOLUTION RESPIRATORY (INHALATION) at 13:41

## 2024-02-06 RX ADMIN — TORSEMIDE 20 MG: 20 TABLET ORAL at 09:39

## 2024-02-06 RX ADMIN — SODIUM CHLORIDE: 9 INJECTION, SOLUTION INTRAVENOUS at 04:19

## 2024-02-06 RX ADMIN — POLYETHYLENE GLYCOL (3350) 17 G: 17 POWDER, FOR SOLUTION ORAL at 09:39

## 2024-02-06 ASSESSMENT — PAIN DESCRIPTION - LOCATION: LOCATION: BACK

## 2024-02-06 ASSESSMENT — PAIN SCALES - GENERAL: PAINLEVEL_OUTOF10: 6

## 2024-02-06 ASSESSMENT — PAIN DESCRIPTION - DESCRIPTORS: DESCRIPTORS: ACHING

## 2024-02-06 NOTE — PLAN OF CARE
Problem: Respiratory - Adult  Goal: Achieves optimal ventilation and oxygenation  2/3/2024 0021 by Mena Hernandez RN  Outcome: Progressing  Flowsheets (Taken 2/2/2024 2117)  Achieves optimal ventilation and oxygenation: Assess for changes in respiratory status  2/2/2024 1832 by Kayy Rivera RN  Outcome: Progressing  Flowsheets (Taken 2/2/2024 1749)  Achieves optimal ventilation and oxygenation:   Assess for changes in respiratory status   Assess for changes in mentation and behavior   Position to facilitate oxygenation and minimize respiratory effort   Oxygen supplementation based on oxygen saturation or arterial blood gases   Initiate smoking cessation protocol as indicated   Encourage broncho-pulmonary hygiene including cough, deep breathe, incentive spirometry   Assess the need for suctioning and aspirate as needed   Assess and instruct to report shortness of breath or any respiratory difficulty   Respiratory therapy support as indicated     Problem: Chronic Conditions and Co-morbidities  Goal: Patient's chronic conditions and co-morbidity symptoms are monitored and maintained or improved  2/3/2024 0021 by Mena Hernandez RN  Outcome: Progressing  2/2/2024 1832 by Kayy Rivera RN  Outcome: Progressing  Flowsheets (Taken 2/2/2024 1749)  Care Plan - Patient's Chronic Conditions and Co-Morbidity Symptoms are Monitored and Maintained or Improved:   Monitor and assess patient's chronic conditions and comorbid symptoms for stability, deterioration, or improvement   Collaborate with multidisciplinary team to address chronic and comorbid conditions and prevent exacerbation or deterioration   Update acute care plan with appropriate goals if chronic or comorbid symptoms are exacerbated and prevent overall improvement and discharge     Problem: Safety - Adult  Goal: Free from fall injury  2/3/2024 0021 by Mena Hernandez RN  Outcome: Progressing  2/2/2024 1832 by Kayy Rivera RN  Outcome: 
  Problem: Respiratory - Adult  Goal: Achieves optimal ventilation and oxygenation  2/3/2024 1239 by Mary Turcios, RN  Outcome: Progressing     Problem: Safety - Adult  Goal: Free from fall injury  2/3/2024 0021 by Mena Hernandez, RN  Outcome: Progressing   Patient 02 2L sat 92-94%, no acute distress noted.  Bed alarm on for safety. Family at bedside.  
  Problem: Respiratory - Adult  Goal: Achieves optimal ventilation and oxygenation  2/3/2024 2023 by Mena Hernandez RN  Outcome: Progressing  Flowsheets (Taken 2/3/2024 2000)  Achieves optimal ventilation and oxygenation: Assess for changes in respiratory status  2/3/2024 1239 by Mary Turcios RN  Outcome: Progressing     Problem: Respiratory - Adult  Goal: Achieves optimal ventilation and oxygenation  2/3/2024 2023 by Mena Hernandez RN  Outcome: Progressing  Flowsheets (Taken 2/3/2024 2000)  Achieves optimal ventilation and oxygenation: Assess for changes in respiratory status  2/3/2024 1239 by Mary Turcios RN  Outcome: Progressing     Problem: Chronic Conditions and Co-morbidities  Goal: Patient's chronic conditions and co-morbidity symptoms are monitored and maintained or improved  Outcome: Progressing     Problem: Safety - Adult  Goal: Free from fall injury  Outcome: Progressing     
  Problem: Respiratory - Adult  Goal: Achieves optimal ventilation and oxygenation  2/4/2024 2318 by Mena Hernandez, RN  Outcome: Progressing  2/4/2024 1612 by Mary Turcios, RN  Outcome: Progressing     Problem: Chronic Conditions and Co-morbidities  Goal: Patient's chronic conditions and co-morbidity symptoms are monitored and maintained or improved  Outcome: Progressing     Problem: Safety - Adult  Goal: Free from fall injury  Outcome: Progressing     Problem: Discharge Planning  Goal: Discharge to home or other facility with appropriate resources  Outcome: Progressing     
  Problem: Respiratory - Adult  Goal: Achieves optimal ventilation and oxygenation  2/5/2024 2315 by DIVYA BALLARD  Outcome: Progressing  2/5/2024 1733 by Deepika Snow RN  Outcome: Progressing     Problem: Chronic Conditions and Co-morbidities  Goal: Patient's chronic conditions and co-morbidity symptoms are monitored and maintained or improved  2/5/2024 2315 by DIVYA BALLARD  Outcome: Progressing  2/5/2024 1733 by Deepika Snow RN  Outcome: Progressing     Problem: Safety - Adult  Goal: Free from fall injury  2/5/2024 2315 by DIVYA BALLARD  Outcome: Progressing  2/5/2024 1733 by Deepika Snow RN  Outcome: Progressing     Problem: Discharge Planning  Goal: Discharge to home or other facility with appropriate resources  2/5/2024 2315 by DIVYA BALLARD  Outcome: Progressing  2/5/2024 1733 by Deepika Snow RN  Outcome: Progressing     Problem: Skin/Tissue Integrity  Goal: Absence of new skin breakdown  Description: 1.  Monitor for areas of redness and/or skin breakdown  2.  Assess vascular access sites hourly  3.  Every 4-6 hours minimum:  Change oxygen saturation probe site  4.  Every 4-6 hours:  If on nasal continuous positive airway pressure, respiratory therapy assess nares and determine need for appliance change or resting period.  2/5/2024 2315 by DIVYA BALLARD  Outcome: Progressing  2/5/2024 1733 by Deepika Snow RN  Outcome: Progressing     Problem: Nutrition Deficit:  Goal: Optimize nutritional status  2/5/2024 2315 by DIVYA BALLARD  Outcome: Progressing  2/5/2024 1733 by Deepika Snow RN  Outcome: Progressing     Problem: Pain  Goal: Verbalizes/displays adequate comfort level or baseline comfort level  2/5/2024 2315 by DIVYA BALLARD  Outcome: Progressing  2/5/2024 1733 by Deepika Snow RN  Outcome: Progressing     
  Problem: Respiratory - Adult  Goal: Achieves optimal ventilation and oxygenation  2/6/2024 1103 by Deepika Snow RN  Outcome: Progressing  2/5/2024 2315 by DIVYA BALLARD  Outcome: Progressing     Problem: Chronic Conditions and Co-morbidities  Goal: Patient's chronic conditions and co-morbidity symptoms are monitored and maintained or improved  2/6/2024 1103 by Deepika Snow RN  Outcome: Progressing  2/5/2024 2315 by DIVYA BALLARD  Outcome: Progressing     Problem: Safety - Adult  Goal: Free from fall injury  2/6/2024 1103 by Deepika Snow RN  Outcome: Progressing  2/5/2024 2315 by DIVYA BALLARD  Outcome: Progressing     Problem: Discharge Planning  Goal: Discharge to home or other facility with appropriate resources  2/6/2024 1103 by Deepika Snow RN  Outcome: Progressing  2/5/2024 2315 by DIVYA BALLARD  Outcome: Progressing     Problem: Skin/Tissue Integrity  Goal: Absence of new skin breakdown  Description: 1.  Monitor for areas of redness and/or skin breakdown  2.  Assess vascular access sites hourly  3.  Every 4-6 hours minimum:  Change oxygen saturation probe site  4.  Every 4-6 hours:  If on nasal continuous positive airway pressure, respiratory therapy assess nares and determine need for appliance change or resting period.  2/6/2024 1103 by Deepika Snow RN  Outcome: Progressing  2/5/2024 2315 by DIVYA BALLARD  Outcome: Progressing     Problem: Nutrition Deficit:  Goal: Optimize nutritional status  2/6/2024 1103 by Deepika Snow RN  Outcome: Progressing  2/5/2024 2315 by DIVYA BALLARD  Outcome: Progressing     Problem: Pain  Goal: Verbalizes/displays adequate comfort level or baseline comfort level  2/6/2024 1103 by Deepika Snow RN  Outcome: Progressing  2/5/2024 2315 by DIVYA BALLARD  Outcome: Progressing     
  Problem: Respiratory - Adult  Goal: Achieves optimal ventilation and oxygenation  2/6/2024 1250 by Deepika Snow RN  Outcome: Adequate for Discharge  2/6/2024 1103 by Deepika Snow RN  Outcome: Progressing  2/5/2024 2315 by DIVYA BALLARD  Outcome: Progressing     Problem: Chronic Conditions and Co-morbidities  Goal: Patient's chronic conditions and co-morbidity symptoms are monitored and maintained or improved  2/6/2024 1250 by Deepika Snow RN  Outcome: Adequate for Discharge  2/6/2024 1103 by Deepika Snow RN  Outcome: Progressing  2/5/2024 2315 by DIVYA BALLARD  Outcome: Progressing     Problem: Safety - Adult  Goal: Free from fall injury  2/6/2024 1250 by Deepika Snow RN  Outcome: Adequate for Discharge  2/6/2024 1103 by Deepika Snow RN  Outcome: Progressing  2/5/2024 2315 by DIVYA BALLARD  Outcome: Progressing     Problem: Discharge Planning  Goal: Discharge to home or other facility with appropriate resources  2/6/2024 1250 by Deepika Snow RN  Outcome: Adequate for Discharge  2/6/2024 1103 by Deepika Snow RN  Outcome: Progressing  2/5/2024 2315 by DIVYA BALLARD  Outcome: Progressing     Problem: Skin/Tissue Integrity  Goal: Absence of new skin breakdown  Description: 1.  Monitor for areas of redness and/or skin breakdown  2.  Assess vascular access sites hourly  3.  Every 4-6 hours minimum:  Change oxygen saturation probe site  4.  Every 4-6 hours:  If on nasal continuous positive airway pressure, respiratory therapy assess nares and determine need for appliance change or resting period.  2/6/2024 1250 by Deepika Snow RN  Outcome: Adequate for Discharge  2/6/2024 1103 by Deepika Snow RN  Outcome: Progressing  2/5/2024 2315 by DIVYA BALLARD  Outcome: Progressing     Problem: Nutrition Deficit:  Goal: Optimize nutritional status  2/6/2024 1250 by Deepika Snow RN  Outcome: Adequate for Discharge  2/6/2024 1103 by Deepika Snow RN  Outcome: Progressing  2/5/2024 
  Problem: Respiratory - Adult  Goal: Achieves optimal ventilation and oxygenation  Outcome: Progressing     Problem: Chronic Conditions and Co-morbidities  Goal: Patient's chronic conditions and co-morbidity symptoms are monitored and maintained or improved  Outcome: Progressing     Problem: Safety - Adult  Goal: Free from fall injury  Outcome: Progressing     Problem: Discharge Planning  Goal: Discharge to home or other facility with appropriate resources  Outcome: Progressing     Problem: Skin/Tissue Integrity  Goal: Absence of new skin breakdown  Description: 1.  Monitor for areas of redness and/or skin breakdown  2.  Assess vascular access sites hourly  3.  Every 4-6 hours minimum:  Change oxygen saturation probe site  4.  Every 4-6 hours:  If on nasal continuous positive airway pressure, respiratory therapy assess nares and determine need for appliance change or resting period.  Outcome: Progressing     Problem: Nutrition Deficit:  Goal: Optimize nutritional status  Outcome: Progressing     Problem: Pain  Goal: Verbalizes/displays adequate comfort level or baseline comfort level  Outcome: Progressing     
  Problem: Respiratory - Adult  Goal: Achieves optimal ventilation and oxygenation  Outcome: Progressing   Respiratory 02 2 L nasal canula on, 02 sat 98%.  Patient using AVAP as requested. Family at bedside. Call light in reach.  
  Problem: Respiratory - Adult  Goal: Achieves optimal ventilation and oxygenation  Outcome: Progressing  Flowsheets (Taken 2/2/2024 3879)  Achieves optimal ventilation and oxygenation:   Assess for changes in respiratory status   Assess for changes in mentation and behavior   Position to facilitate oxygenation and minimize respiratory effort   Oxygen supplementation based on oxygen saturation or arterial blood gases   Initiate smoking cessation protocol as indicated   Encourage broncho-pulmonary hygiene including cough, deep breathe, incentive spirometry   Assess the need for suctioning and aspirate as needed   Assess and instruct to report shortness of breath or any respiratory difficulty   Respiratory therapy support as indicated     Problem: Chronic Conditions and Co-morbidities  Goal: Patient's chronic conditions and co-morbidity symptoms are monitored and maintained or improved  Outcome: Progressing  Flowsheets (Taken 2/2/2024 5379)  Care Plan - Patient's Chronic Conditions and Co-Morbidity Symptoms are Monitored and Maintained or Improved:   Monitor and assess patient's chronic conditions and comorbid symptoms for stability, deterioration, or improvement   Collaborate with multidisciplinary team to address chronic and comorbid conditions and prevent exacerbation or deterioration   Update acute care plan with appropriate goals if chronic or comorbid symptoms are exacerbated and prevent overall improvement and discharge     Problem: Safety - Adult  Goal: Free from fall injury  Outcome: Progressing  Flowsheets (Taken 2/2/2024 1823)  Free From Fall Injury:   Instruct family/caregiver on patient safety   Based on caregiver fall risk screen, instruct family/caregiver to ask for assistance with transferring infant if caregiver noted to have fall risk factors     Problem: Discharge Planning  Goal: Discharge to home or other facility with appropriate resources  Outcome: Progressing  Flowsheets  Taken 2/2/2024 
None  ARNI:: None    Evidenced-Based Beta Blocker is REQUIRED for EF </= 40% SYSTOLIC FAILURE:   :: Metoprolol SUCCinate- Toprol XL    Diuretics:  ::     Diet Reviewed: Yes   Diet NPO Exceptions are: Ice Chips    Goal of Care Reviewed: Yes   Patient and/or Family's stated Goal of Care this Admission: reduce shortness of breath, increase activity tolerance, better understand heart failure and disease management, be more comfortable, and reduce lower extremity edema prior to discharge.

## 2024-02-06 NOTE — DISCHARGE INSTR - ACTIVITY
Your information:  Name: Terri Smith  : 1940    Your instructions:    Follow up with PCP in 1 week  CBC in 1 week  Avoid constipation    What to do after you leave the hospital:    Recommended diet: regular diet    Recommended activity: activity as tolerated        The following personal items were collected during your admission and were returned to you:    Belongings  Dental Appliances: None  Vision - Corrective Lenses: None  Hearing Aid: None  Clothing: Other (Comment) (blankets)  Jewelry: None  Body Piercings Removed: N/A  Electronic Devices: None  Weapons (Notify Protective Services/Security): None  Other Valuables: At bedside  Home Medications: None  Valuables Given To: Other (Comment) (none)  Provide Name(s) of Who Valuable(s) Were Given To: none    Information obtained by:  By signing below, I understand that if any problems occur once I leave the hospital I am to contact MD.  I understand and acknowledge receipt of the instructions indicated above.

## 2024-02-06 NOTE — CARE COORDINATION
DISCHARGE ORDER  Date/Time 2024 10:43 AM  Completed by: Danni Abdullahi RN, Case Management    Patient Name: Terri Smith    : 1940      Admit order Date and Status: IP 2024  Noted discharge order. (verify MD's last order for status of admission/Traditional Medicare 3 [place] Inpatient qualifying stay required for SNF)    Confirmed discharge plan with:              Patient:  Yes, son at bedside                            Discharge to Facility:     Name: [facility] ([facility])  Address: [address] [place] [address] , [place], [place], [address]  Phone:[phone]  Fax: [phone]    Facility phone number for staff giving report: see above   Pre-certification completed: NA   Hospital Exemption Notification (HENS) completed: NA (return to SNF)   Discharge orders and Continuity of Care faxed to facility:  [facility]      Transportation:               Medical Transport explained with choice list offered to pt/family.                Choice: Per Round Trip (no preference)  Agency used: [facility]   time:   3:00 PM      Pt/family/Nursing/Facility aware of  time:   Pt and son  Tram Kapoor [facility]  Amanda ([facility])    Ambulance form completed: YES     Date Last IMM Given: 2024    Comments:    Pt is being d/c'd to [facility] ([facility]) - after PT/OT evals today. Pt's O2 sats are 97% on 2 liters.    Discharge timeout done with Tram RN. All discharge needs and concerns addressed.    Discharging nurse to complete LUPE, reconcile AVS, and place final copy with patient's discharge packet. Discharging RN to ensure that written prescriptions for  Level II medications are sent with patient to the facility as per protocol.

## 2024-02-06 NOTE — DISCHARGE SUMMARY
Name:  Terri Smith  Room:  0230/0230-01  MRN:    [id number]    IM Discharge Summary    Discharging Physician:  Renny garner MD    Admit: 2/2/2024    Discharge:      PCP      Jackson Ontiveros MD    Diagnoses and hospital course  this Admission        Acute on Chronic blood loss  Anemia  Recent admission for GI bleed  GERD with Hx of severe esophagitis   - hemoglobin at SNF 6.5 >7.5 here   - given 1 unit PRBC and improved and no further bleed noted  - ct with constipation only   - occult negative here , pt blind and unable to mention about any black stools  - held eliquis   - colonoscopy in 2023 showed internal hemorrhoids , flex sig 9/23   - EGD this adm with no obvious source, no further bleeding since adm  - suspect bleeding from hemorrhoids prior to admission  - resume eliquis at lower dose 2.5 mg bid and monitor  - hb stable at 9.8 on dc  - avoid constipation, daily bowel regimen          Constipation  - on metamucil, colace - had BM here without hemorrhoidal bleed        Nausea, H/O esophagitis  - cont PPI, prn zofran - tolerating diet well      Severe COPD   Chronic Hypoxic and hypercapnic respiratory failure   - no AE  - continue scheduled and PRN inhalers  - continue daliresp  - continue home AVAPS         PAF- currently NSR  - HR controlled  - on eliquis--resume at dc   - continue Toprol XL  - Secondary hypercoagulable state in a patient with atrial fibrillation        Hx of CAD  Hx of 2V CABG  - holding Eliquis  - continue BB and statin  - denies chest pain     Hx of Chronic systolic CHF with recovered EF  - appears dry  - holding Torsemide at this time, resume at dc  - daily weights, low sodium diet, strict I/O     DM type 2  - SSI  - monitor blood glucose     CKD stage 3 a  - appears stable       Depression   Anxiety   On buspar     Severe protein calorie malnutrition  - likely 2/2 COPD  - dietary consulted      Recent COVID-19 with admission 1/2/2023         HPI   83 y.o. female with PMH of

## 2024-02-06 NOTE — FLOWSHEET NOTE
02/06/24 0914   Vital Signs   Temp 98 °F (36.7 °C)   Temp Source Oral   Pulse (!) 107   Heart Rate Source Monitor   Respirations 16   /68   MAP (Calculated) 83   BP Location Right upper arm   BP Method Automatic   Patient Position Semi fowlers   Pain Assessment   Pain Assessment None - Denies Pain   Opioid-Induced Sedation   POSS Score 1   RASS   Love Agitation Sedation Scale (RASS) 0   Oxygen Therapy   SpO2 97 %   O2 Device Nasal cannula   O2 Flow Rate (L/min) 2 L/min     Patient alert resting in bed, skin warm and dry, respirs WNL, meds given, VSS, nurse assessment completed, call light and overbed table within easy reach, bed alarm, no other voiced concerns or needs at this time. See flow sheet and MAR. Deepika Snow RN ([place])

## 2024-02-06 NOTE — PROGRESS NOTES
PROGRESS NOTE    HPI: Terri Smith is a(n)83 y.o. female admitted for work-up and treatment for Oxygen dependent [Z99.81]  Anemia, unspecified type [D64.9]  Acute on chronic anemia [D64.9].     We are following for GIB.    Subjective:     There has been no overt bleeding reported.  Patient is feeling well.  Non bloody bowel movement today.  No GI complaints.        Objective:     I/O last 3 completed shifts:  In: 1050 [P.O.:280; I.V.:770]  Out: 450 [Urine:450]      /67   Pulse 96   Temp 97.1 °F (36.2 °C) (Oral)   Resp 18   Ht 1.626 m (5' 4.02\")   Wt 46.8 kg (103 lb 1.6 oz)   SpO2 95%   BMI 17.69 kg/m²     Physical Exam:  HEENT: anicteric sclera, oropharyngeal membranes pink and moist.  Cor: RRR  Lungs: non-labored at rest, 2 L O2  Abdomen: soft, NT. No ascites.  No hepatomegaly or splenomegaly  Extremities: no edema  Neuro: alert and oriented x 3    Results:   Lab Results   Component Value Date    ALT <5 (L) 02/02/2024    AST 15 02/02/2024    ALKPHOS 91 02/02/2024    BILIDIR <0.2 09/19/2019    PROT 6.7 02/02/2024    LABALBU 3.4 02/02/2024    INR 1.05 02/03/2024    LIPASE 58.0 02/02/2024     Lab Results   Component Value Date    WBC 8.9 02/02/2024    HGB 10.0 (L) 02/04/2024    HCT 29.8 (L) 02/04/2024    MCV 88.1 02/02/2024     (H) 02/02/2024     BUN/Cr/glu/ALT/AST/amyl/lip:  13/0.8/--/--/--/--/-- (02/04 0559)  CT ABDOMEN PELVIS W IV CONTRAST Additional Contrast? None    Result Date: 2/2/2024  Mild rectal wall thickening.  Negative for obstruction.     XR CHEST (2 VW)    Result Date: 2/2/2024  No radiographic evidence of acute pulmonary disease.         Impression:  83 year old female with past medical history of HTN, COPD w/ chronic O2 dependence (2-3L via NC), CAD (s/p CABG 9/2019), CHF, CKD3, DM2, pAfib on eliquis who presents to the ER with abdominal pain and anemia on outpatient blood work.    Acute on chronic normocytic 
   02/02/24 2000   RT Protocol   History Pulmonary Disease 2   Respiratory pattern 0   Breath sounds 0   Cough 0   Indications for Bronchodilator Therapy Decreased or absent breath sounds   Bronchodilator Assessment Score 2     RT Inhaler-Nebulizer Bronchodilator Protocol Note    There is a bronchodilator order in the chart from a provider indicating to follow the RT Bronchodilator Protocol and there is an “Initiate RT Inhaler-Nebulizer Bronchodilator Protocol” order as well (see protocol at bottom of note).    CXR Findings:  No results found.    The findings from the last RT Protocol Assessment were as follows:   History Pulmonary Disease: Chronic pulmonary disease  Respiratory Pattern: Regular pattern and RR 12-20 bpm  Breath Sounds: Clear breath sounds  Cough: Strong, spontaneous, non-productive  Indication for Bronchodilator Therapy: Decreased or absent breath sounds  Bronchodilator Assessment Score: 2    Aerosolized bronchodilator medication orders have been revised according to the RT Inhaler-Nebulizer Bronchodilator Protocol below.    Respiratory Therapist to perform RT Therapy Protocol Assessment initially then follow the protocol.  Repeat RT Therapy Protocol Assessment PRN for score 0-3 or on second treatment, BID, and PRN for scores above 3.    No Indications - adjust the frequency to every 6 hours PRN wheezing or bronchospasm, if no treatments needed after 48 hours then discontinue using Per Protocol order mode.     If indication present, adjust the RT bronchodilator orders based on the Bronchodilator Assessment Score as indicated below.  Use Inhaler orders unless patient has one or more of the following: on home nebulizer, not able to hold breath for 10 seconds, is not alert and oriented, cannot activate and use MDI correctly, or respiratory rate 25 breaths per minute or more, then use the equivalent nebulizer order(s) with same Frequency and PRN reasons based on the score.  If a patient is on this 
   02/03/24 5938   NIV Type   $NIV $Daily Charge   NIV Started/Stopped (S)  On   Equipment Type v60   Mode AVAPS   Mask Type Full face mask   Mask Size Small   Assessment   Comfort Level Good   Using Accessory Muscles No   Mask Compliance Good   Skin Protection for O2 Device Yes   Location Nose   Breath Sounds   Breath Sounds Bilateral Diminished   Settings/Measurements   CPAP/EPAP 4 cmH2O   IPAP Min 10 cmH2O   IPAP Max 25 cmH2O   Vt (Set, mL) 350 mL   Vt (Measured) 456 mL   Rate Ordered 14   FiO2  35 %   Minute Volume (L/min) 11.7 Liters   Mask Leak (lpm) 0 lpm   Patient's Home Machine No   Alarm Settings   Alarms On Y   Low Pressure (cmH2O) 8 cmH2O   High Pressure (cmH2O) 40 cmH2O   Apnea (secs) 20 secs   RR Low (bpm) 12   RR High (bpm) 40 br/min       
.Pre-Operative:  1.  Patient/Caregiver identifies - states name and date of birth.  2.  The patient is free from signs and symptoms of injury.  3.  The patient receives appropriate medication(s), safely administered during the Perioperative period.  4.  The patients's fluid, electrolyte, and acid-base balances are established preoperatively.  5.  The patient's pulmonary function is established preoperatively.  6.  The patient's cardiovascular status is established preoperatively.  7.  The patient / caregiver demonstrates knowledge of nutritional management related to the operative or other invasive procedure.  8.  The patient/caregiver demonstrates knowledge of medication management.  9.  The patient/caregiver demonstrates knowledge of pain management.  10.  The patient/caregiver participates in decisions affection his or her Perioperative plan of care.  11.  The patient's care is consistent with the individualized Perioperative plan of care.  12.  The patient's right to privacy is maintained.  13.  The patient is the recipient of competent and ethical care within legal standards of practice.  14.  The patient's value system, lifestyle, ethnicity, and culture are considered, respected, and incorporated in the Perioperative plan of care and understands special services available.  15.  The patient demonstrates and/or reports adequate pain control throughout the the Perioperative period.  16.  The patient's neurological status is established preoperatively.  17.  The patient/caregiver demonstrates knowledge of the expected responses to the endoscopy procedure.  18.  Patient/Caregiver has reduced anxiety.  Interventions- Familiarize with environment and equipment.  19. Patient/Caregiver verbalizes understanding of Phase II and/or Phase I process.  20.  Patient pain level is established preoperatively using age appropriate pain scale.  21.  The patient will move to fall risk upon sedation- during and through the 
/67   Pulse 98   Temp 98 °F (36.7 °C) (Oral)   Resp 17   Ht 1.626 m (5' 4.02\")   Wt 46.8 kg (103 lb 1.6 oz)   SpO2 95%   BMI 17.69 kg/m²     Assessment complete. Meds passed; see MAR. Pt a/o x4 resting in bed. IVF infusing per order. Bed locked and in lowest position bed alarm on for pt safety. Pt denies needs at this time    
BP (!) 147/83   Pulse 86   Temp 97.6 °F (36.4 °C) (Oral)   Resp 16   Ht 1.626 m (5' 4.02\")   Wt 46.8 kg (103 lb 1.6 oz)   SpO2 97%   BMI 17.69 kg/m²     Assessment complete. Meds passed. IVF infusing per order. Bed locked and in lowest position, bed alarm on for safety. Call light and bedside table within reach. Pt denies needs at this time  
Bedside Mobility Assessment Tool (BMAT):     Assessment Level 1- Sit and Shake    1. From a semi-reclined position, ask patient to sit up and rotate to a seated position at the side of the bed. Can use the bedrail.    2. Ask patient to reach out and grab your hand and shake making sure patient reaches across his/her midline.   Pass- Patient is able to come to a seated position, maintain core strength. Maintains seated balance while reaching across midline. Move on to Assessment Level 2.     Assessment Level 2- Stretch and Point   1. With patient in seated position at the side of the bed, have patient place both feet on the floor (or stool) with knees no higher than hips.    2. Ask patient to stretch one leg and straighten the knee, then bend the ankle/flex and point the toes. If appropriate, repeat with the other leg.   Pass- Patient is able to demonstrate appropriate quad strength on intended weight bearing limb(s). Move onto Assessment Level 3.     Assessment Level 3- Stand   1. Ask patient to elevate off the bed or chair (seated to standing) using an assistive device (cane, bedrail).    2. Patient should be able to raise buttocks off be and hold for a count of five. May repeat once.   Pass- Patient maintains standing stability for at least 5 seconds, proceed to assessment level 4.    Assessment Level 4- Walk   1. Ask patient to march in place at bedside.    2. Then ask patient to advance step and return each foot. Some medical conditions may render a patient from stepping backwards, use your best clinical judgement.   Pass- Patient demonstrates balance while shifting weight and ability to step, takes independent steps, does not use assistive device patient is MOBILITY LEVEL 4.      Mobility Level- 4    
Comprehensive Nutrition Assessment    Type and Reason for Visit:  Initial, Consult (consult for ONS)    Nutrition Recommendations/Plan:   Continue ADULT DIET; Regular; 4 carb choices per meal diet order.   Added Ensure Enlive with breakfast and dinner meals.   Monitor appetite, meal intake, and acceptance/intake of ONS.   Please obtain an actual, current weight for this patient - weight obtained upon admission is an estimated weight.   Monitor nutrition-related labs, bowel function + GI status, and weight trends.      Malnutrition Assessment:  Malnutrition Status:  Severe malnutrition (02/03/24 1324)    Context:  Chronic Illness     Findings of the 6 clinical characteristics of malnutrition:  Energy Intake:  75% or less estimated energy requirements for 1 month or longer  Weight Loss:  Greater than 7.5% over 3 months (-9# or 8.5% weight loss since 12/15/23)     Body Fat Loss:  Unable to assess (patient was resting)     Muscle Mass Loss:  Unable to assess (patient was resting)    Fluid Accumulation:  No significant fluid accumulation     Strength:  Not Performed    Nutrition Assessment:    patient is nutritionally compromised AEB recent hx of severe malnutrition diagnosis (on 1/8/24 when admitted to [facility]) and poor po intake PTA and she is at risk for further compromise d/t ongoing poor appetite/po intake, underweight status, weight loss since Dec 2023, and severe malnutrition present during this admission; will continue ADULT DIET; Regular; 4 carb choices per meal diet order and start Ensure Enlive with breakfast and dinner meals    Nutrition Related Findings:    patient is A & O x 4; she presented to the hospital from rehab ([facility]) with c/o back and abdominal pain + abnormal lab; patient was recently admitted to [facility] 1/2/24-1/9/24 with COVID-19 virus; RD diagnosed patient with severe malnutrition on 1/8/24; patient was not consuming adequate po intake at that time either; patient c/o constipation PTA; + 
Consent for EGD in am signed and on chart.  
GI consult called to Sherry Lopez NP on 2/2/24. Left message on office phone.    Rosalia Dejesus  
HEART FAILURE CARE PLAN:    Comorbidities Reviewed: Yes   Patient has a past medical history of NADJA (acute kidney injury) ([facility]), Asthma, Atrial fibrillation with RVR ([facility]), CAD (coronary artery disease), CHF (congestive heart failure) ([facility]), Chronic anxiety, COPD (chronic obstructive pulmonary disease) ([facility]), COPD (chronic obstructive pulmonary disease) ([facility]), GERD (gastroesophageal reflux disease), Heart attack ([facility]), History of blood transfusion, Hyperlipidemia, Hypertension, MRSA (methicillin resistant staph aureus) culture positive, OA (osteoarthritis), and Thyroid disease.     Weights Reviewed: Yes   Admission weight: 46.8 kg (103 lb 1.6 oz)   Wt Readings from Last 3 Encounters:   02/02/24 46.8 kg (103 lb 1.6 oz)   01/09/24 46.8 kg (103 lb 1.6 oz)   12/22/23 50.3 kg (110 lb 14.3 oz)     Intake & Output Reviewed: Yes     Intake/Output Summary (Last 24 hours) at 2/6/2024 1110  Last data filed at 2/6/2024 0951  Gross per 24 hour   Intake 20 ml   Output 1750 ml   Net -1730 ml       ECHOCARDIOGRAM Reviewed: Yes   Patient's Ejection Fraction (EF) is greater than 40%     Medications Reviewed: Yes   SCHEDULED HOSPITAL MEDICATIONS:   torsemide  20 mg Oral Daily    polyethylene glycol  17 g Oral Daily    docusate sodium  100 mg Oral Daily    sodium chloride flush  5-40 mL IntraVENous 2 times per day    pantoprazole (PROTONIX) 40 mg in sodium chloride (PF) 0.9 % 10 mL injection  40 mg IntraVENous Daily    atorvastatin  40 mg Oral Nightly    guaiFENesin  600 mg Oral Daily    lactobacillus  1 capsule Oral Daily with breakfast    magnesium oxide  400 mg Oral Daily    metoprolol tartrate  25 mg Oral BID    Roflumilast  500 mcg Oral Daily    psyllium  1 packet Oral Daily    budesonide-formoterol  2 puff Inhalation BID RT    tiotropium  2 puff Inhalation Daily RT     HOME MEDICATIONS:  Prior to Admission medications    Medication Sig Start Date End Date Taking? Authorizing Provider   amLODIPine (NORVASC) 10 MG tablet Take 0.5 
Handoff report and transfer of care given at bedside to [place].  Patient in stable condition, denies needs/concerns at this time.  Call light within reach.     
IM Progress Note    Admit Date:  2/2/2024      Acute on Chronic Anemia  Recent admission for GI bleed  Recent covid noted    Subjective:    Ms. Smith  is blind and unable to notice any black stools at home  But does report constipation issues and hx of hemorrhoids   Stool occult neg here  Holding AC       Objective:   BP (!) 147/83   Pulse 86   Temp 97.6 °F (36.4 °C) (Oral)   Resp 16   Ht 1.626 m (5' 4.02\")   Wt 46.8 kg (103 lb 1.6 oz)   SpO2 97%   BMI 17.69 kg/m²     Intake/Output Summary (Last 24 hours) at 2/5/2024 0750  Last data filed at 2/5/2024 0109  Gross per 24 hour   Intake 1576.9 ml   Output 775 ml   Net 801.9 ml           Physical Exam:    Gen: Pleasant elderly female looks fatigued . No distress. Alert. Appears thin   Eyes: PERRL. No sclera icterus. No conjunctival injection.  Legally blind, has poor vision   ENT: No discharge. Pharynx clear.   Neck: No JVD.  Trachea midline.  Resp: No accessory muscle use. Diminished throughout. No crackles. No wheezes. No rhonchi.   On 2 liters n/c  CV: Regular rate. Regular rhythm. No murmur.  No rub. No edema.   GI: mild epigastric tenderness upon palpation. Non-distended. Normal bowel sounds.  Skin: Warm and dry. No nodule on exposed extremities. No rash on exposed extremities.   Chronic venous changes BLE  Scattered ecchymosis   M/S: No cyanosis. No joint deformity. No clubbing.   Neuro: Awake. Grossly nonfocal    Psych: Oriented x 3. No anxiety or agitation      Medications:   Scheduled Meds:   polyethylene glycol  17 g Oral Daily    docusate sodium  100 mg Oral Daily    pantoprazole  40 mg IntraVENous Once    sodium chloride flush  5-40 mL IntraVENous 2 times per day    pantoprazole (PROTONIX) 40 mg in sodium chloride (PF) 0.9 % 10 mL injection  40 mg IntraVENous Daily    atorvastatin  40 mg Oral Nightly    guaiFENesin  600 mg Oral Daily    lactobacillus  1 capsule Oral Daily with breakfast    magnesium oxide  400 mg Oral Daily    metoprolol tartrate  
IM Progress Note    Admit Date:  2/2/2024    Subjective:  Ms. Smith  has no complaints. Awake, no distress.       Objective:   /61   Pulse 89   Temp 97.6 °F (36.4 °C) (Oral)   Resp 18   Ht 1.626 m (5' 4.02\")   Wt 46.8 kg (103 lb 1.6 oz)   SpO2 96%   BMI 17.69 kg/m²     Intake/Output Summary (Last 24 hours) at 2/3/2024 1636  Last data filed at 2/3/2024 1350  Gross per 24 hour   Intake 1172.95 ml   Output --   Net 1172.95 ml         Physical Exam:  Gen: No distress. Alert. Appears thin and chronically ill, pleasant elderly female  Eyes: PERRL. No sclera icterus. No conjunctival injection.  Legally blind, has poor vision   ENT: No discharge. Pharynx clear.   Neck: No JVD.  Trachea midline.  Resp: No accessory muscle use. Diminished throughout. No crackles. No wheezes. No rhonchi.   On 2 liters n/c  CV: Regular rate. Regular rhythm. No murmur.  No rub. No edema.   GI: mild epigastric tenderness upon palpation. Non-distended. Normal bowel sounds.  Skin: Warm and dry. No nodule on exposed extremities. No rash on exposed extremities.   Chronic venous changes BLE  Scattered ecchymosis   M/S: No cyanosis. No joint deformity. No clubbing.   Neuro: Awake. Grossly nonfocal    Psych: Oriented x 3. No anxiety or agitation      Medications:   Scheduled Meds:   polyethylene glycol  17 g Oral Daily    docusate sodium  100 mg Oral Daily    pantoprazole  40 mg IntraVENous Once    sodium chloride flush  5-40 mL IntraVENous 2 times per day    pantoprazole (PROTONIX) 40 mg in sodium chloride (PF) 0.9 % 10 mL injection  40 mg IntraVENous Daily    atorvastatin  40 mg Oral Nightly    guaiFENesin  600 mg Oral Daily    lactobacillus  1 capsule Oral Daily with breakfast    magnesium oxide  400 mg Oral Daily    metoprolol tartrate  25 mg Oral BID    Roflumilast  500 mcg Oral Daily    psyllium  1 packet Oral Daily    budesonide-formoterol  2 puff Inhalation BID RT    tiotropium  2 puff Inhalation Daily RT       Continuous 
IV catheter discontinued intact. Site without signs and symptoms of complications. Dressing and pressure applied.   IV removed and discharge instructions completed. All questions were answered to patients satisfaction.   Request for transport placed, all personal belongs packed. No further needs noted.    
Inpatient Occupational Therapy Evaluation and Treatment    Unit: [facility]  Date:  2/6/2024  Patient Name:    Terri Smith  Admitting diagnosis:  Oxygen dependent [Z99.81]  Anemia, unspecified type [D64.9]  Acute on chronic anemia [D64.9]  Admit Date:  2/2/2024  Precautions/Restrictions/WB Status/ Lines/ Wounds/ Oxygen:   Fall risk, Bed/chair alarm, and Lines (Supplemental O2 (2L) and external catheter)   Pt seen for cotreatment this date due to complexity of condition    Treatment Time:  4720-7272  Treatment Number:  1  Timed Code Treatment Minutes: 0 minutes  Total Treatment Minutes:  10  minutes    Patient Goals for Therapy: \"go to rehab  \"          Discharge Recommendations: SNF  DME needs for discharge: Defer to facility       Therapy recommendations for staff:   Assist of 1 for with gait belt for transfer     History of Present Illness: per H&P   83 y.o. female for concerns for low hemoglobin. Onset was today.  Context includes patient reports that she is currently living in the nursing home for rehab.  Patient states that she uses oxygen at home at baseline.  She reports that she was sent into the ED today because her hemoglobin was low at the nursing home.  Patient reports to me that she is a history of hemorrhoids however has been using suppositories and the nurses in the nursing home report that she is not any bleeding.  Patient denies any chest pain or shortness of breath.  Per EMS report patient did complain of abdominal pain and route.  Patient does state that she recently had a uterine infection and had a \"D&C \".  Patient denies any fevers but has had chills.  She does report nausea. Alleviating factors include nothing.  Aggravating factors include nothing. Pain is 4/10.  Nothing has been used for pain today.      Home Health S4 Level Recommendation:  na    AM-PAC Score:   17  Subjective:  Patient lying reclined in bed with visitor present.   Pt agreeable to this OT session.     Cognition:    A&O 
[facility]   COPD / HEART FAILURE PROGRAM      NAME:  Terri Smith  AGE: 83 y.o.   GENDER: female  : 1940  TODAY'S DATE:  2024    Subjective:     VISIT TYPE: Education    ADMIT DATE: 2024    PAST MEDICAL HISTORY:      Diagnosis Date    NADJA (acute kidney injury) ([facility])     Asthma     Atrial fibrillation with RVR ([facility])     CAD (coronary artery disease)     CHF (congestive heart failure) ([facility])     unsure    Chronic anxiety     COPD (chronic obstructive pulmonary disease) ([facility])     COPD (chronic obstructive pulmonary disease) ([facility]) 2023    GERD (gastroesophageal reflux disease) 2021    Heart attack ([facility])     History of blood transfusion     Hyperlipidemia     Hypertension     MRSA (methicillin resistant staph aureus) culture positive 2019    + resp cx    OA (osteoarthritis) 2014    Thyroid disease      HOME MEDICATIONS:  Prior to Admission medications    Medication Sig Start Date End Date Taking? Authorizing Provider   sodium chloride (OCEAN, BABY AYR) 0.65 % nasal spray 1 spray by Nasal route as needed for Congestion 24  Benson Fish MD   amLODIPine (NORVASC) 10 MG tablet Take 1 tablet by mouth daily 24   Joyce Tesfaye MD   ELIQUIS 2.5 MG TABS tablet Take 1 tablet by mouth 2 times daily 10/2/23   ProviderCharmaine MD   metoprolol tartrate (LOPRESSOR) 25 MG tablet Take 1 tablet by mouth in the morning and at bedtime 23   Nadiya Hernandez MD   aspirin 81 MG chewable tablet Take 1 tablet by mouth daily Resume ASA on Monday. Monitor for any recurrent GI bleed. 23   Moon Sena MD   lactobacillus (CULTURELLE) capsule Take 1 capsule by mouth daily (with breakfast) 23   Moon Sena MD   psyllium (METAMUCIL) 58.12 % PACK packet Take 1 packet by mouth daily  Patient not taking: Reported on 2/3/2024 9/2/23   Moon Snea MD   atorvastatin (LIPITOR) 40 MG tablet Take 1 tablet by mouth at 
One unit of PRBC finished, patient without complains of . A/O.  
Patient ate ice cream, and c/o nausea. Zofran 4 mg IV given.  
Patient provided a COPD Educational Folder that includes the following materials:     [x]  [employer] Booklet: Managing your COPD  [x]  ALA: Getting the Most Out of Medication Delivery Devices  [x]  ALA: My COPD Action Plan  [x]  Better Breathers Club: [facility]   [x]  Smoking Cessation Classes  [x]  Outpatient Spiritual Care Services  [x]  [employer]: Signs of COPD    PATIENT/CAREGIVER TEACHING:   Level of patient/caregiver understanding able to:   [x] Verbalize understanding   [] Demonstrate understanding       [] Teach back        [] Needs reinforcement     []  Other:     Electronically signed by Deepika Snow RN ([place]) on 2/6/2024 at 11:10 AM    
Placed Pt. On our v60 in AVAPS mode with her home settings at 2354.  Called in Patient's room at 0018 to remove BiPAP per Patient's request.because she is coughing up phlegm   
Pt a/o. Am assessment completed see flow sheet. Pt denies any pain/ needs at this time. Call light within reach.   Vitals:    02/03/24 0836   BP: 105/67   Pulse: 83   Resp: 18   Temp: 97.7 °F (36.5 °C)   SpO2: 92%      
Pt a/o. Am assessment completed see flow sheet. Pt denies complaints at this time. Call light within reach.   Vitals:    02/04/24 0845   BP: 119/67   Pulse: 96   Resp: 18   Temp: 97.1 °F (36.2 °C)   SpO2: 95%      
Pt c/o anxiety, Buspar given at 2305, see MAR.  Pt increasingly anxious through the night. Tired non pharm measures, nothing helped. Perfect serve sent, Ativan 0.5 mg IV one time dose ordered; see MAR    
Pt called out with c/o nausea, headache and feeling anxious. PRN Zofran, Tylenol and Buspar administered. See MAR  
Pt is refusing BiPAP (AVAPS) overnight  
Report called to Tram rn for transfer of care.    
Spoke with Beth BECKETT on floor. Patient vitals stable, denies pain. Is producing stringy saliva, patient states this is not new.  Patient expresses desire to get back up to floor.  
Telemetry Assignment Communication Form    Patient has orders for continuous telemetry OR pulse oximeter only orders    To be filled out by Clinical    Patient has Admission or Transfer orders and is assigned to 230      (Once this top section is completed:  Select \"Route\" and send note to Fax number: [phone]))      ___________________________________________________________________________      To be filled out by [facility]    Patient assigned to tele box number: ______20____________               (to be written in by CMU when telemetry box is assigned to patient)    ___________________________________________________________________________      Bedside RN confirming that the box listed above is in fact the telemetry box number being placed on the patient listed above.        X________________________________________ RN signature        ______________JULIE_______________________RN assigned to Patient (please print)    _______________ Date    ____________ Time      
      Continuous Infusions:   sodium chloride      sodium chloride      sodium chloride 50 mL/hr at 02/04/24 1235    dextrose         Data:  CBC:   Recent Labs     02/02/24  1345 02/02/24  1932 02/03/24  1641 02/04/24  0102 02/04/24  0824   WBC 8.9  --   --   --   --    RBC 2.61*  --   --   --   --    HGB 7.5*   < > 9.0* 8.9* 10.0*   HCT 23.0*   < > 25.1* 25.4* 29.8*   MCV 88.1  --   --   --   --    RDW 15.0  --   --   --   --    *  --   --   --   --     < > = values in this interval not displayed.       BMP:   Recent Labs     02/02/24  1345 02/03/24  0535 02/04/24  0559    142 141   K 4.6 4.8 3.9    106 107   CO2 33* 29 26   BUN 16 13 13   CREATININE 1.0 0.9 0.8       BNP: No results for input(s): \"BNP\" in the last 72 hours.  PT/INR:   Recent Labs     02/03/24  0535   PROTIME 13.7   INR 1.05       APTT:   Recent Labs     02/03/24  0535   APTT 47.0*       CARDIAC ENZYMES: No results for input(s): \"CKMB\", \"CKMBINDEX\", \"TROPONINI\" in the last 72 hours.    Invalid input(s): \"CKTOTAL;3\"  FASTING LIPID PANEL:  Lab Results   Component Value Date    CHOL 139 08/20/2023    HDL 61 (H) 08/20/2023    TRIG 88 08/20/2023     LIVER PROFILE:   Recent Labs     02/02/24  1345   AST 15   ALT <5*   BILITOT <0.2   ALKPHOS 91            Cultures  Results       Procedure Component Value Units Date/Time    COVID-19 & Influenza Combo [[id number]] Collected: 02/02/24 1729    Order Status: Completed Specimen: Nasopharyngeal Swab Updated: 02/02/24 1805     SARS-CoV-2 RNA, RT PCR NOT DETECTED     Comment: Not Detected results do not preclude SARS-CoV-2 infection and  should not be used as the sole basis for patient management  decisions.  Results must be combined with clinical observations,  patient history, and epidemiological information.  Testing was performed using ART DEEP SARS-CoV-2 and Influenza A/B  nucleic acid assay. This test is a multiplex Real-Time Reverse  Transcriptase Polymerase Chain Reaction 
      Mobility Level- 3    
daily  Awaiting pathology   Trend H&H  Monitor for signs of bleeding  Regular diet   Consider IV iron supplementation  Okay to discharge from GI standpoint     Susan REES Susi, APRN - CNP  10:11 AM 2/6/2024                      83 year old female with history of DM, HTN, HLD, CAD s/p CABG, CHF, CKD, A fib admitted with acute on chronic anemia. EGD was non-diagnostic    Continue supportive care. Continue PPI daily. Monitor Hgb. Observe for signs of bleeding. Recommend aggressive bowel regimen with senna and Miralax. Ok to d/c from GI standpoint.    Celestine Lester MD          (O) [phone]  (O) [phone]   
of breathing using Per Protocol order mode.        4-6 - enter or revise RT Bronchodilator order(s) to two equivalent RT bronchodilator orders with one order with BID Frequency and one order with Frequency of every 4 hours PRN wheezing or increased work of breathing using Per Protocol order mode.        7-10 - enter or revise RT Bronchodilator order(s) to two equivalent RT bronchodilator orders with one order with TID Frequency and one order with Frequency of every 4 hours PRN wheezing or increased work of breathing using Per Protocol order mode.       11-13 - enter or revise RT Bronchodilator order(s) to one equivalent RT bronchodilator order with QID Frequency and an Albuterol order with Frequency of every 4 hours PRN wheezing or increased work of breathing using Per Protocol order mode.      Greater than 13 - enter or revise RT Bronchodilator order(s) to one equivalent RT bronchodilator order with every 4 hours Frequency and an Albuterol order with Frequency of every 2 hours PRN wheezing or increased work of breathing using Per Protocol order mode.     RT to enter RT Home Evaluation for COPD & MDI Assessment order using Per Protocol order mode.    Electronically signed by Deepika Del Valle RCP on 2/6/2024 at 7:44 AM  
ft (Bed>chair with 90* turn)  Deviations (firm surface/linoleum):  none  Assistive Device Used:    No AD  Level of Assist:    SBA   Comment: moves with relative ease    Stair Training  deferred    Therapeutic Exercises Initiated  deferred secondary to pt irritated.     Activity Tolerance   During therapy session noted pt with no adverse symptoms to activity    Positioning Needs   Pt up in chair, alarm set, positioned in proper neutral alignment and pressure relief provided.   Call light provided and all needs within reach  RN aware of pt position/status    Other Activities  None.    Patient/Family Education   Pt educated on role of inpatient PT, POC, importance of continued activity, calling for assist with mobility.      Assessment  Pt seen today for a limited physical therapy Evaluation & Treatment. Pt agreeable only to bed>chair transfer. No significant balance/strength deficits notable with this however would need increased engagement in order to fully assess her level of function. Pt intends to return to SNF for completion of her therapy POC.    Goals  1 time eval & treat only. Discharge planning for return to SNF later today.     Signature: Danni Larkin, PT     If patient discharges from this facility prior to next visit, this note will serve as the Discharge Summary.

## 2024-02-06 NOTE — FLOWSHEET NOTE
02/06/24 0354   Vital Signs   Temp 98.7 °F (37.1 °C)   Temp Source Oral   Pulse 78   Heart Rate Source Monitor   Respirations 18   /72   MAP (Calculated) 83   BP Location Right upper arm   BP Method Automatic   Patient Position Semi fowfestus   Opioid-Induced Sedation   POSS Score 1   Oxygen Therapy   SpO2 99 %   O2 Device Nasal cannula   O2 Flow Rate (L/min) 2 L/min     Pt VS as shown above.

## 2024-02-15 ENCOUNTER — APPOINTMENT (OUTPATIENT)
Dept: GENERAL RADIOLOGY | Age: 84
End: 2024-02-15
Payer: MEDICARE

## 2024-02-15 ENCOUNTER — HOSPITAL ENCOUNTER (EMERGENCY)
Age: 84
Discharge: HOME | End: 2024-02-16
Attending: EMERGENCY MEDICINE
Payer: MEDICARE

## 2024-02-15 DIAGNOSIS — J44.9 CHRONIC OBSTRUCTIVE PULMONARY DISEASE, UNSPECIFIED COPD TYPE (HCC): Primary | ICD-10-CM

## 2024-02-15 LAB
ALBUMIN SERPL-MCNC: 3.1 G/DL (ref 3.4–5)
ALBUMIN/GLOB SERPL: 0.8 {RATIO} (ref 1.1–2.2)
ALP SERPL-CCNC: 103 U/L (ref 40–129)
ALT SERPL-CCNC: 6 U/L (ref 10–40)
ANION GAP SERPL CALCULATED.3IONS-SCNC: 9 MMOL/L (ref 3–16)
AST SERPL-CCNC: 15 U/L (ref 15–37)
BASE EXCESS BLDV CALC-SCNC: 4.4 MMOL/L (ref -3–3)
BASOPHILS # BLD: 0.1 K/UL (ref 0–0.2)
BASOPHILS NFR BLD: 1.1 %
BILIRUB SERPL-MCNC: <0.2 MG/DL (ref 0–1)
BUN SERPL-MCNC: 22 MG/DL (ref 7–20)
CALCIUM SERPL-MCNC: 8.6 MG/DL (ref 8.3–10.6)
CHLORIDE SERPL-SCNC: 97 MMOL/L (ref 99–110)
CO2 BLDV-SCNC: 32 MMOL/L
CO2 SERPL-SCNC: 32 MMOL/L (ref 21–32)
COHGB MFR BLDV: 1 % (ref 0–1.5)
CREAT SERPL-MCNC: 1.1 MG/DL (ref 0.6–1.2)
DEPRECATED RDW RBC AUTO: 15.2 % (ref 12.4–15.4)
EOSINOPHIL # BLD: 0.3 K/UL (ref 0–0.6)
EOSINOPHIL NFR BLD: 2.3 %
GFR SERPLBLD CREATININE-BSD FMLA CKD-EPI: 50 ML/MIN/{1.73_M2}
GLUCOSE SERPL-MCNC: 108 MG/DL (ref 70–99)
HCO3 BLDV-SCNC: 30 MMOL/L (ref 23–29)
HCT VFR BLD AUTO: 32.9 % (ref 36–48)
HGB BLD-MCNC: 10.4 G/DL (ref 12–16)
LACTATE BLDV-SCNC: 1.3 MMOL/L (ref 0.4–1.9)
LYMPHOCYTES # BLD: 1.7 K/UL (ref 1–5.1)
LYMPHOCYTES NFR BLD: 14.8 %
MCH RBC QN AUTO: 28 PG (ref 26–34)
MCHC RBC AUTO-ENTMCNC: 31.7 G/DL (ref 31–36)
MCV RBC AUTO: 88.2 FL (ref 80–100)
METHGB MFR BLDV: 0.1 %
MONOCYTES # BLD: 0.5 K/UL (ref 0–1.3)
MONOCYTES NFR BLD: 4.5 %
NEUTROPHILS # BLD: 8.7 K/UL (ref 1.7–7.7)
NEUTROPHILS NFR BLD: 77.3 %
NT-PROBNP SERPL-MCNC: 1140 PG/ML (ref 0–449)
O2 CT VFR BLDV CALC: 11 VOL %
O2 THERAPY: ABNORMAL
PCO2 BLDV: 48.9 MMHG (ref 40–50)
PH BLDV: 7.41 [PH] (ref 7.35–7.45)
PLATELET # BLD AUTO: 270 K/UL (ref 135–450)
PMV BLD AUTO: 8.1 FL (ref 5–10.5)
PO2 BLDV: 38.4 MMHG (ref 25–40)
POTASSIUM SERPL-SCNC: 3.6 MMOL/L (ref 3.5–5.1)
PROT SERPL-MCNC: 7.2 G/DL (ref 6.4–8.2)
RBC # BLD AUTO: 3.73 M/UL (ref 4–5.2)
SAO2 % BLDV: 72 %
SODIUM SERPL-SCNC: 138 MMOL/L (ref 136–145)
TROPONIN, HIGH SENSITIVITY: 48 NG/L (ref 0–14)
TROPONIN, HIGH SENSITIVITY: 49 NG/L (ref 0–14)
WBC # BLD AUTO: 11.2 K/UL (ref 4–11)

## 2024-02-15 PROCEDURE — 36415 COLL VENOUS BLD VENIPUNCTURE: CPT

## 2024-02-15 PROCEDURE — 93005 ELECTROCARDIOGRAM TRACING: CPT | Performed by: EMERGENCY MEDICINE

## 2024-02-15 PROCEDURE — 80053 COMPREHEN METABOLIC PANEL: CPT

## 2024-02-15 PROCEDURE — 83880 ASSAY OF NATRIURETIC PEPTIDE: CPT

## 2024-02-15 PROCEDURE — 71045 X-RAY EXAM CHEST 1 VIEW: CPT

## 2024-02-15 PROCEDURE — 6370000000 HC RX 637 (ALT 250 FOR IP): Performed by: EMERGENCY MEDICINE

## 2024-02-15 PROCEDURE — 83605 ASSAY OF LACTIC ACID: CPT

## 2024-02-15 PROCEDURE — 84484 ASSAY OF TROPONIN QUANT: CPT

## 2024-02-15 PROCEDURE — 99285 EMERGENCY DEPT VISIT HI MDM: CPT

## 2024-02-15 PROCEDURE — 85025 COMPLETE CBC W/AUTO DIFF WBC: CPT

## 2024-02-15 PROCEDURE — 96360 HYDRATION IV INFUSION INIT: CPT

## 2024-02-15 PROCEDURE — 96361 HYDRATE IV INFUSION ADD-ON: CPT

## 2024-02-15 PROCEDURE — 2580000003 HC RX 258: Performed by: EMERGENCY MEDICINE

## 2024-02-15 PROCEDURE — 82803 BLOOD GASES ANY COMBINATION: CPT

## 2024-02-15 RX ORDER — ALBUTEROL SULFATE 2.5 MG/3ML
2.5 SOLUTION RESPIRATORY (INHALATION) ONCE
Status: COMPLETED | OUTPATIENT
Start: 2024-02-16 | End: 2024-02-16

## 2024-02-15 RX ORDER — IPRATROPIUM BROMIDE AND ALBUTEROL SULFATE 2.5; .5 MG/3ML; MG/3ML
1 SOLUTION RESPIRATORY (INHALATION) ONCE
Status: COMPLETED | OUTPATIENT
Start: 2024-02-15 | End: 2024-02-15

## 2024-02-15 RX ORDER — 0.9 % SODIUM CHLORIDE 0.9 %
500 INTRAVENOUS SOLUTION INTRAVENOUS ONCE
Status: COMPLETED | OUTPATIENT
Start: 2024-02-15 | End: 2024-02-16

## 2024-02-15 RX ORDER — AZITHROMYCIN 250 MG/1
TABLET, FILM COATED ORAL
Qty: 6 TABLET | Refills: 0 | Status: SHIPPED | OUTPATIENT
Start: 2024-02-15 | End: 2024-02-16

## 2024-02-15 RX ORDER — ONDANSETRON 4 MG/1
4 TABLET, FILM COATED ORAL EVERY 8 HOURS PRN
COMMUNITY

## 2024-02-15 RX ORDER — PREDNISONE 50 MG/1
50 TABLET ORAL DAILY
Qty: 5 TABLET | Refills: 0 | Status: SHIPPED | OUTPATIENT
Start: 2024-02-15 | End: 2024-02-16

## 2024-02-15 RX ORDER — PREDNISONE 20 MG/1
60 TABLET ORAL ONCE
Status: COMPLETED | OUTPATIENT
Start: 2024-02-15 | End: 2024-02-15

## 2024-02-15 RX ADMIN — PREDNISONE 60 MG: 20 TABLET ORAL at 23:55

## 2024-02-15 RX ADMIN — IPRATROPIUM BROMIDE AND ALBUTEROL SULFATE 1 DOSE: 2.5; .5 SOLUTION RESPIRATORY (INHALATION) at 21:58

## 2024-02-15 RX ADMIN — SODIUM CHLORIDE 500 ML: 9 INJECTION, SOLUTION INTRAVENOUS at 22:00

## 2024-02-15 ASSESSMENT — PAIN SCALES - GENERAL: PAINLEVEL_OUTOF10: 10

## 2024-02-16 VITALS
BODY MASS INDEX: 17.72 KG/M2 | HEIGHT: 64 IN | HEART RATE: 116 BPM | DIASTOLIC BLOOD PRESSURE: 74 MMHG | RESPIRATION RATE: 32 BRPM | WEIGHT: 103.8 LBS | TEMPERATURE: 98.6 F | OXYGEN SATURATION: 99 % | SYSTOLIC BLOOD PRESSURE: 124 MMHG

## 2024-02-16 LAB
EKG ATRIAL RATE: 166 BPM
EKG DIAGNOSIS: NORMAL
EKG P AXIS: 90 DEGREES
EKG Q-T INTERVAL: 320 MS
EKG QRS DURATION: 78 MS
EKG QTC CALCULATION (BAZETT): 458 MS
EKG R AXIS: -70 DEGREES
EKG T AXIS: 83 DEGREES
EKG VENTRICULAR RATE: 123 BPM

## 2024-02-16 PROCEDURE — 6360000002 HC RX W HCPCS: Performed by: EMERGENCY MEDICINE

## 2024-02-16 PROCEDURE — 93010 ELECTROCARDIOGRAM REPORT: CPT | Performed by: INTERNAL MEDICINE

## 2024-02-16 RX ORDER — AZITHROMYCIN 250 MG/1
TABLET, FILM COATED ORAL
Qty: 6 TABLET | Refills: 0 | Status: SHIPPED | OUTPATIENT
Start: 2024-02-16 | End: 2024-02-26

## 2024-02-16 RX ORDER — PREDNISONE 50 MG/1
50 TABLET ORAL DAILY
Qty: 5 TABLET | Refills: 0 | Status: SHIPPED | OUTPATIENT
Start: 2024-02-16 | End: 2024-02-21

## 2024-02-16 RX ADMIN — ALBUTEROL SULFATE 2.5 MG: 2.5 SOLUTION RESPIRATORY (INHALATION) at 00:01

## 2024-02-16 NOTE — ED NOTES
2207 Ex-Ray advised CRC is running behind and Pt is at the top of the list, informed Provider    2343 April from [facility] called for update and can be reached at [phone] D , Shadi BECKETT

## 2024-02-16 NOTE — ED NOTES
April LPN called to check on patient, per April patient has avap and she does not leave it on and they have tried to educate patient on  this.

## 2024-02-16 NOTE — ED PROVIDER NOTES
I assumed care of this patient from Dr. Munguia at shift change with case management consultation pending.  She presented with shortness of breath.  She has a history of COPD.  She is stable on her home oxygen and improved here with breathing treatments.  She is currently at [facility].  She is unhappy with her care there and states they are using air fresheners that make her short of breath.  She was initially refusing to go back there.  We did consult case management this morning and Bea Chavez has seen the patient.  She has spoken with her son who is at bedside.  She has also spoken with [facility].  The patient is there for short-term rehab.  They state that they will address some of her concerns there.  The patient is comfortable going back to [facility] where they will address her concerns.  If she still wants to go home her son is comfortable taking her home from there.    On my exam she is in no acute distress.  Oxygen sats on home oxygen are 99%.  Lungs are clear to auscultation bilaterally, no wheezing    Results for orders placed or performed during the hospital encounter of 02/15/24   CBC with Auto Differential   Result Value Ref Range    WBC 11.2 (H) 4.0 - 11.0 K/uL    RBC 3.73 (L) 4.00 - 5.20 M/uL    Hemoglobin 10.4 (L) 12.0 - 16.0 g/dL    Hematocrit 32.9 (L) 36.0 - 48.0 %    MCV 88.2 80.0 - 100.0 fL    MCH 28.0 26.0 - 34.0 pg    MCHC 31.7 31.0 - 36.0 g/dL    RDW 15.2 12.4 - 15.4 %    Platelets 270 135 - 450 K/uL    MPV 8.1 5.0 - 10.5 fL    Neutrophils % 77.3 %    Lymphocytes % 14.8 %    Monocytes % 4.5 %    Eosinophils % 2.3 %    Basophils % 1.1 %    Neutrophils Absolute 8.7 (H) 1.7 - 7.7 K/uL    Lymphocytes Absolute 1.7 1.0 - 5.1 K/uL    Monocytes Absolute 0.5 0.0 - 1.3 K/uL    Eosinophils Absolute 0.3 0.0 - 0.6 K/uL    Basophils Absolute 0.1 0.0 - 0.2 K/uL   CMP w/ Reflex to MG   Result Value Ref Range    Sodium 138 136 - 145 mmol/L    Potassium reflex Magnesium 3.6 3.5 - 5.1  mmol/L    Chloride 97 (L) 99 - 110 mmol/L    CO2 32 21 - 32 mmol/L    Anion Gap 9 3 - 16    Glucose 108 (H) 70 - 99 mg/dL    BUN 22 (H) 7 - 20 mg/dL    Creatinine 1.1 0.6 - 1.2 mg/dL    Est, Glom Filt Rate 50 (A) >60    Calcium 8.6 8.3 - 10.6 mg/dL    Total Protein 7.2 6.4 - 8.2 g/dL    Albumin 3.1 (L) 3.4 - 5.0 g/dL    Albumin/Globulin Ratio 0.8 (L) 1.1 - 2.2    Total Bilirubin <0.2 0.0 - 1.0 mg/dL    Alkaline Phosphatase 103 40 - 129 U/L    ALT 6 (L) 10 - 40 U/L    AST 15 15 - 37 U/L   Troponin   Result Value Ref Range    Troponin, High Sensitivity 48 (H) 0 - 14 ng/L   Troponin   Result Value Ref Range    Troponin, High Sensitivity 49 (H) 0 - 14 ng/L   Lactate, Sepsis   Result Value Ref Range    Lactic Acid, Sepsis 1.3 0.4 - 1.9 mmol/L   BNP   Result Value Ref Range    Pro-BNP 1,140 (H) 0 - 449 pg/mL   Blood Gas, Venous   Result Value Ref Range    pH, Zach 7.405 7.350 - 7.450    pCO2, Zach 48.9 40.0 - 50.0 mmHg    pO2, Zach 38.4 25.0 - 40.0 mmHg    HCO3, Venous 30.0 (H) 23.0 - 29.0 mmol/L    Base Excess, Zach 4.4 (H) -3.0 - 3.0 mmol/L    O2 Sat, Zach 72 Not Established %    Carboxyhemoglobin 1.0 0.0 - 1.5 %    MetHgb, Zach 0.1 <1.5 %    TC02 (Calc), Zach 32 Not Established mmol/L    O2 Content, Zach 11 Not Established VOL %    O2 Therapy Unknown    EKG 12 Lead   Result Value Ref Range    Ventricular Rate 123 BPM    Atrial Rate 166 BPM    QRS Duration 78 ms    Q-T Interval 320 ms    QTc Calculation (Bazett) 458 ms    P Axis 90 degrees    R Axis -70 degrees    T Axis 83 degrees    Diagnosis       Sinus tachycardia with frequent Premature atrial complexesLeft axis deviationInferior infarct , age undeterminedAnteroseptal infarct (cited on or before 18-DEC-2023)Nonspecific ST and T wave abnormalityAbnormal ECGWhen compared with ECG of 02-FEB-2024 12:56,Significant changes have occurredConfirmed by TOR HERNANDEZ MD ([id number]) on 2/16/2024 7:48:44 AM         XR CHEST PORTABLE   Final Result   1. Emphysema.  No superimposed acute

## 2024-02-16 NOTE — ED PROVIDER NOTES
Patient was signed out pending reevaluation after repeat breathing treatment please see Dr. Andrews's note for initial HPI.    Patient on reevaluation has no increased work of breathing.  She continues to be mildly tachypneic however exhibits no signs of respiratory distress.  Patient has stated to nursing that she does not want to go back to her nursing facility because of the air freshener in her room that makes it difficult for her to breathe.  She states that she does not want to return to the nursing home.    As patient has no respiratory distress, patient does not meet criteria for admission at this time.  There is no significant CO2 retention.  Patient will await for case management evaluation to determine appropriate placement as patient does not go back to her current facility.         West Munguia MD  02/16/24 4401

## 2024-02-16 NOTE — ED NOTES
Patient states she is not going back to the nursing home she can't breath because of the airfreshners that are plugged into the wall at the nursing home and they unplug her breathing machine because it makes noise.

## 2024-02-16 NOTE — ED PROVIDER NOTES
nebulizer solution 1 Dose  ONCE        Question:  Initiate RT Bronchodilator Protocol  Answer:  No    Last MAR action: Given - by FAHAD ROBINS on 02/15/24 at 2158 CHERELLE FLORENTINO    02/15/24 2145 02/15/24 2142  sodium chloride 0.9 % bolus 500 mL  ONCE         Last MAR action: New Bag - by FAHAD ROBINS on 02/15/24 at 2200 CHERELLE FLORENTINO                Clinical Impression:  1. Chronic obstructive pulmonary disease, unspecified COPD type ([facility])          Blood pressure 137/88, pulse (!) 103, temperature 98.6 °F (37 °C), temperature source Oral, resp. rate (!) 34, height 1.626 m (5' 4\"), weight 47.1 kg (103 lb 12.8 oz), SpO2 98 %, not currently breastfeeding.    Patient was given scripts for the following medications. I counseled patient how to take these medications.   New Prescriptions    AZITHROMYCIN (ZITHROMAX) 250 MG TABLET    500mg on day 1 followed by 250mg on days 2 - 5    PREDNISONE (DELTASONE) 50 MG TABLET    Take 1 tablet by mouth daily for 5 days     Modified Medications    No medications on file       Disposition referral (if applicable):  Jackson Ontiveros MD  [address] [address] [phone]    Schedule an appointment as soon as possible for a visit       [facility] ED  [address] [place] [address]  [phone]  Go to   If symptoms worsen      ICherelle, am the primary attending of record and contributed the majority of evaluation and treatment of emergent care for this encounter.     Total critical care time is 10 minutes, which excludes separately billable procedures and updating family. Time spent is specifically for management of the presenting complaint and symptoms initially, direct bedside care, reevaluation, review of records, and consultation.  There was a high probability of clinically significant life-threatening deterioration in the patient's condition, which required my urgent intervention.     This chart was generated

## 2024-02-27 ENCOUNTER — APPOINTMENT (OUTPATIENT)
Dept: CT IMAGING | Age: 84
End: 2024-02-27
Payer: MEDICARE

## 2024-02-27 ENCOUNTER — HOSPITAL ENCOUNTER (EMERGENCY)
Age: 84
Discharge: HOME | End: 2024-02-27
Attending: STUDENT IN AN ORGANIZED HEALTH CARE EDUCATION/TRAINING PROGRAM
Payer: MEDICARE

## 2024-02-27 ENCOUNTER — OFFICE VISIT (OUTPATIENT)
Dept: PULMONOLOGY | Age: 84
End: 2024-02-27
Payer: MEDICARE

## 2024-02-27 ENCOUNTER — APPOINTMENT (OUTPATIENT)
Dept: GENERAL RADIOLOGY | Age: 84
End: 2024-02-27
Payer: MEDICARE

## 2024-02-27 VITALS
BODY MASS INDEX: 16.39 KG/M2 | DIASTOLIC BLOOD PRESSURE: 73 MMHG | RESPIRATION RATE: 19 BRPM | SYSTOLIC BLOOD PRESSURE: 96 MMHG | TEMPERATURE: 97.7 F | OXYGEN SATURATION: 97 % | WEIGHT: 96 LBS | HEIGHT: 64 IN | HEART RATE: 89 BPM

## 2024-02-27 VITALS
HEART RATE: 77 BPM | DIASTOLIC BLOOD PRESSURE: 60 MMHG | WEIGHT: 96 LBS | SYSTOLIC BLOOD PRESSURE: 100 MMHG | OXYGEN SATURATION: 99 % | BODY MASS INDEX: 16.39 KG/M2 | RESPIRATION RATE: 17 BRPM | HEIGHT: 64 IN

## 2024-02-27 DIAGNOSIS — J44.1 COPD EXACERBATION (HCC): Primary | ICD-10-CM

## 2024-02-27 DIAGNOSIS — J18.9 PNEUMONIA DUE TO INFECTIOUS ORGANISM, UNSPECIFIED LATERALITY, UNSPECIFIED PART OF LUNG: ICD-10-CM

## 2024-02-27 DIAGNOSIS — J44.9 CHRONIC OBSTRUCTIVE PULMONARY DISEASE, UNSPECIFIED COPD TYPE (HCC): Primary | ICD-10-CM

## 2024-02-27 LAB
ALBUMIN SERPL-MCNC: 3.6 G/DL (ref 3.4–5)
ALBUMIN/GLOB SERPL: 1 {RATIO} (ref 1.1–2.2)
ALP SERPL-CCNC: 102 U/L (ref 40–129)
ALT SERPL-CCNC: 12 U/L (ref 10–40)
ANION GAP SERPL CALCULATED.3IONS-SCNC: 11 MMOL/L (ref 3–16)
AST SERPL-CCNC: 22 U/L (ref 15–37)
BASE EXCESS BLDV CALC-SCNC: 7.4 MMOL/L (ref -3–3)
BASOPHILS # BLD: 0.1 K/UL (ref 0–0.2)
BASOPHILS NFR BLD: 1 %
BILIRUB SERPL-MCNC: 0.4 MG/DL (ref 0–1)
BUN SERPL-MCNC: 17 MG/DL (ref 7–20)
CALCIUM SERPL-MCNC: 9 MG/DL (ref 8.3–10.6)
CHLORIDE SERPL-SCNC: 96 MMOL/L (ref 99–110)
CO2 BLDV-SCNC: 35 MMOL/L
CO2 SERPL-SCNC: 34 MMOL/L (ref 21–32)
COHGB MFR BLDV: 1.1 % (ref 0–1.5)
CREAT SERPL-MCNC: 1.1 MG/DL (ref 0.6–1.2)
DEPRECATED RDW RBC AUTO: 15.7 % (ref 12.4–15.4)
EOSINOPHIL # BLD: 0.2 K/UL (ref 0–0.6)
EOSINOPHIL NFR BLD: 1.4 %
FLUAV RNA UPPER RESP QL NAA+PROBE: NEGATIVE
FLUBV AG NPH QL: NEGATIVE
GFR SERPLBLD CREATININE-BSD FMLA CKD-EPI: 50 ML/MIN/{1.73_M2}
GLUCOSE SERPL-MCNC: 115 MG/DL (ref 70–99)
HCO3 BLDV-SCNC: 33.4 MMOL/L (ref 23–29)
HCT VFR BLD AUTO: 32.1 % (ref 36–48)
HGB BLD-MCNC: 10.2 G/DL (ref 12–16)
INR PPP: 1.22 (ref 0.84–1.16)
LACTATE BLDV-SCNC: 1.3 MMOL/L (ref 0.4–2)
LYMPHOCYTES # BLD: 0.9 K/UL (ref 1–5.1)
LYMPHOCYTES NFR BLD: 6.8 %
MCH RBC QN AUTO: 27.9 PG (ref 26–34)
MCHC RBC AUTO-ENTMCNC: 31.8 G/DL (ref 31–36)
MCV RBC AUTO: 88 FL (ref 80–100)
METHGB MFR BLDV: 0.3 %
MONOCYTES # BLD: 0.6 K/UL (ref 0–1.3)
MONOCYTES NFR BLD: 4.3 %
NEUTROPHILS # BLD: 11.6 K/UL (ref 1.7–7.7)
NEUTROPHILS NFR BLD: 86.5 %
NT-PROBNP SERPL-MCNC: 1025 PG/ML (ref 0–449)
O2 CT VFR BLDV CALC: 9 VOL %
O2 THERAPY: ABNORMAL
PCO2 BLDV: 53.9 MMHG (ref 40–50)
PH BLDV: 7.41 [PH] (ref 7.35–7.45)
PLATELET # BLD AUTO: 417 K/UL (ref 135–450)
PMV BLD AUTO: 7.3 FL (ref 5–10.5)
PO2 BLDV: 30.5 MMHG (ref 25–40)
POTASSIUM SERPL-SCNC: 4 MMOL/L (ref 3.5–5.1)
PROCALCITONIN SERPL IA-MCNC: 0.28 NG/ML (ref 0–0.15)
PROT SERPL-MCNC: 7.1 G/DL (ref 6.4–8.2)
PROTHROMBIN TIME: 15.4 SEC (ref 11.5–14.8)
RBC # BLD AUTO: 3.65 M/UL (ref 4–5.2)
SAO2 % BLDV: 58 %
SARS-COV-2 RDRP RESP QL NAA+PROBE: NOT DETECTED
SODIUM SERPL-SCNC: 141 MMOL/L (ref 136–145)
TROPONIN, HIGH SENSITIVITY: 43 NG/L (ref 0–14)
TROPONIN, HIGH SENSITIVITY: 51 NG/L (ref 0–14)
WBC # BLD AUTO: 13.4 K/UL (ref 4–11)

## 2024-02-27 PROCEDURE — 2700000000 HC OXYGEN THERAPY PER DAY

## 2024-02-27 PROCEDURE — 3078F DIAST BP <80 MM HG: CPT | Performed by: INTERNAL MEDICINE

## 2024-02-27 PROCEDURE — 1090F PRES/ABSN URINE INCON ASSESS: CPT | Performed by: INTERNAL MEDICINE

## 2024-02-27 PROCEDURE — 36415 COLL VENOUS BLD VENIPUNCTURE: CPT

## 2024-02-27 PROCEDURE — G8484 FLU IMMUNIZE NO ADMIN: HCPCS | Performed by: INTERNAL MEDICINE

## 2024-02-27 PROCEDURE — 87635 SARS-COV-2 COVID-19 AMP PRB: CPT

## 2024-02-27 PROCEDURE — 84145 PROCALCITONIN (PCT): CPT

## 2024-02-27 PROCEDURE — 84484 ASSAY OF TROPONIN QUANT: CPT

## 2024-02-27 PROCEDURE — 3023F SPIROM DOC REV: CPT | Performed by: INTERNAL MEDICINE

## 2024-02-27 PROCEDURE — 71260 CT THORAX DX C+: CPT

## 2024-02-27 PROCEDURE — 6370000000 HC RX 637 (ALT 250 FOR IP): Performed by: STUDENT IN AN ORGANIZED HEALTH CARE EDUCATION/TRAINING PROGRAM

## 2024-02-27 PROCEDURE — 85610 PROTHROMBIN TIME: CPT

## 2024-02-27 PROCEDURE — 85025 COMPLETE CBC W/AUTO DIFF WBC: CPT

## 2024-02-27 PROCEDURE — 96375 TX/PRO/DX INJ NEW DRUG ADDON: CPT

## 2024-02-27 PROCEDURE — 71045 X-RAY EXAM CHEST 1 VIEW: CPT

## 2024-02-27 PROCEDURE — 3074F SYST BP LT 130 MM HG: CPT | Performed by: INTERNAL MEDICINE

## 2024-02-27 PROCEDURE — 99214 OFFICE O/P EST MOD 30 MIN: CPT | Performed by: INTERNAL MEDICINE

## 2024-02-27 PROCEDURE — G8427 DOCREV CUR MEDS BY ELIG CLIN: HCPCS | Performed by: INTERNAL MEDICINE

## 2024-02-27 PROCEDURE — G8400 PT W/DXA NO RESULTS DOC: HCPCS | Performed by: INTERNAL MEDICINE

## 2024-02-27 PROCEDURE — 83880 ASSAY OF NATRIURETIC PEPTIDE: CPT

## 2024-02-27 PROCEDURE — 96365 THER/PROPH/DIAG IV INF INIT: CPT

## 2024-02-27 PROCEDURE — 87804 INFLUENZA ASSAY W/OPTIC: CPT

## 2024-02-27 PROCEDURE — 99285 EMERGENCY DEPT VISIT HI MDM: CPT

## 2024-02-27 PROCEDURE — 83605 ASSAY OF LACTIC ACID: CPT

## 2024-02-27 PROCEDURE — 6360000002 HC RX W HCPCS: Performed by: STUDENT IN AN ORGANIZED HEALTH CARE EDUCATION/TRAINING PROGRAM

## 2024-02-27 PROCEDURE — 1124F ACP DISCUSS-NO DSCNMKR DOCD: CPT | Performed by: INTERNAL MEDICINE

## 2024-02-27 PROCEDURE — 94640 AIRWAY INHALATION TREATMENT: CPT

## 2024-02-27 PROCEDURE — 2580000003 HC RX 258: Performed by: STUDENT IN AN ORGANIZED HEALTH CARE EDUCATION/TRAINING PROGRAM

## 2024-02-27 PROCEDURE — G8419 CALC BMI OUT NRM PARAM NOF/U: HCPCS | Performed by: INTERNAL MEDICINE

## 2024-02-27 PROCEDURE — 96368 THER/DIAG CONCURRENT INF: CPT

## 2024-02-27 PROCEDURE — 1036F TOBACCO NON-USER: CPT | Performed by: INTERNAL MEDICINE

## 2024-02-27 PROCEDURE — 6360000004 HC RX CONTRAST MEDICATION: Performed by: STUDENT IN AN ORGANIZED HEALTH CARE EDUCATION/TRAINING PROGRAM

## 2024-02-27 PROCEDURE — 82803 BLOOD GASES ANY COMBINATION: CPT

## 2024-02-27 PROCEDURE — 1111F DSCHRG MED/CURRENT MED MERGE: CPT | Performed by: INTERNAL MEDICINE

## 2024-02-27 PROCEDURE — 80053 COMPREHEN METABOLIC PANEL: CPT

## 2024-02-27 RX ORDER — BUSPIRONE HYDROCHLORIDE 5 MG/1
5 TABLET ORAL ONCE
Status: COMPLETED | OUTPATIENT
Start: 2024-02-27 | End: 2024-02-27

## 2024-02-27 RX ORDER — IPRATROPIUM BROMIDE AND ALBUTEROL SULFATE 2.5; .5 MG/3ML; MG/3ML
1 SOLUTION RESPIRATORY (INHALATION)
Status: DISCONTINUED | OUTPATIENT
Start: 2024-02-27 | End: 2024-02-27 | Stop reason: HOSPADM

## 2024-02-27 RX ORDER — ALBUTEROL SULFATE 90 UG/1
2 AEROSOL, METERED RESPIRATORY (INHALATION) EVERY 4 HOURS PRN
Status: DISCONTINUED | OUTPATIENT
Start: 2024-02-27 | End: 2024-02-27 | Stop reason: HOSPADM

## 2024-02-27 RX ORDER — FUROSEMIDE 10 MG/ML
20 INJECTION INTRAMUSCULAR; INTRAVENOUS ONCE
Status: COMPLETED | OUTPATIENT
Start: 2024-02-27 | End: 2024-02-27

## 2024-02-27 RX ORDER — AMOXICILLIN AND CLAVULANATE POTASSIUM 875; 125 MG/1; MG/1
1 TABLET, FILM COATED ORAL 2 TIMES DAILY
Qty: 14 TABLET | Refills: 0 | Status: SHIPPED | OUTPATIENT
Start: 2024-02-27 | End: 2024-03-05

## 2024-02-27 RX ORDER — PREDNISONE 10 MG/1
TABLET ORAL
Qty: 20 TABLET | Refills: 0 | Status: SHIPPED | OUTPATIENT
Start: 2024-02-27 | End: 2024-03-08

## 2024-02-27 RX ORDER — AZITHROMYCIN 250 MG/1
TABLET, FILM COATED ORAL
Qty: 6 TABLET | Refills: 0 | Status: SHIPPED | OUTPATIENT
Start: 2024-02-27 | End: 2024-03-08

## 2024-02-27 RX ADMIN — IPRATROPIUM BROMIDE AND ALBUTEROL SULFATE 1 DOSE: 2.5; .5 SOLUTION RESPIRATORY (INHALATION) at 10:45

## 2024-02-27 RX ADMIN — BUSPIRONE HYDROCHLORIDE 5 MG: 5 TABLET ORAL at 13:56

## 2024-02-27 RX ADMIN — SODIUM CHLORIDE 500 MG: 9 INJECTION, SOLUTION INTRAVENOUS at 13:55

## 2024-02-27 RX ADMIN — METHYLPREDNISOLONE SODIUM SUCCINATE 125 MG: 125 INJECTION INTRAMUSCULAR; INTRAVENOUS at 13:14

## 2024-02-27 RX ADMIN — IPRATROPIUM BROMIDE AND ALBUTEROL SULFATE 1 DOSE: 2.5; .5 SOLUTION RESPIRATORY (INHALATION) at 15:43

## 2024-02-27 RX ADMIN — IOPAMIDOL 75 ML: 755 INJECTION, SOLUTION INTRAVENOUS at 16:42

## 2024-02-27 RX ADMIN — IOPAMIDOL 75 ML: 755 INJECTION, SOLUTION INTRAVENOUS at 15:14

## 2024-02-27 RX ADMIN — CEFTRIAXONE SODIUM 1000 MG: 1 INJECTION, POWDER, FOR SOLUTION INTRAMUSCULAR; INTRAVENOUS at 13:19

## 2024-02-27 RX ADMIN — FUROSEMIDE 20 MG: 10 INJECTION, SOLUTION INTRAMUSCULAR; INTRAVENOUS at 16:25

## 2024-02-27 ASSESSMENT — PAIN DESCRIPTION - LOCATION: LOCATION: ABDOMEN

## 2024-02-27 ASSESSMENT — SLEEP AND FATIGUE QUESTIONNAIRES
HOW LIKELY ARE YOU TO NOD OFF OR FALL ASLEEP IN A CAR, WHILE STOPPED FOR A FEW MINUTES IN TRAFFIC: 0
HOW LIKELY ARE YOU TO NOD OFF OR FALL ASLEEP WHILE SITTING INACTIVE IN A PUBLIC PLACE: 0
HOW LIKELY ARE YOU TO NOD OFF OR FALL ASLEEP WHILE SITTING QUIETLY AFTER LUNCH WITHOUT ALCOHOL: 0
NECK CIRCUMFERENCE (INCHES): 12
HOW LIKELY ARE YOU TO NOD OFF OR FALL ASLEEP WHILE WATCHING TV: 0
HOW LIKELY ARE YOU TO NOD OFF OR FALL ASLEEP WHILE SITTING AND TALKING TO SOMEONE: 0
HOW LIKELY ARE YOU TO NOD OFF OR FALL ASLEEP WHILE SITTING AND READING: 1
HOW LIKELY ARE YOU TO NOD OFF OR FALL ASLEEP WHEN YOU ARE A PASSENGER IN A CAR FOR AN HOUR WITHOUT A BREAK: 0
HOW LIKELY ARE YOU TO NOD OFF OR FALL ASLEEP WHILE LYING DOWN TO REST IN THE AFTERNOON WHEN CIRCUMSTANCES PERMIT: 3
ESS TOTAL SCORE: 4

## 2024-02-27 ASSESSMENT — PAIN SCALES - GENERAL: PAINLEVEL_OUTOF10: 4

## 2024-02-27 ASSESSMENT — PAIN - FUNCTIONAL ASSESSMENT: PAIN_FUNCTIONAL_ASSESSMENT: 0-10

## 2024-02-27 NOTE — DISCHARGE INSTRUCTIONS
You were evaluated in the emergency department for cough. Assessments and testing completed during your visit were reassuring and at this time there is no indication for further testing, treatment or admission to the hospital. Given this it is appropriate to discharge you from the emergency department. At the time of discharge we discussed the following:    I believe you have pneumonia which has worsened your COPD.  Please take the antibiotics and steroids as prescribed to completion and I expect your condition to improve however please return with any new or worsening symptoms    Please note that sometimes it is difficult to diagnose a medical condition early in the disease process before the disease is fully manifest. Because of this, should you develop any new or worsening symptoms, you may return at any time to the emergency department for another evaluation. If available you are also recommended to review this visit with your primary care physician or other medical provider in the next 7 days. Thank you for allowing us to care for you today.

## 2024-02-27 NOTE — PROGRESS NOTES
Patient's iv was hand flushed with blood return without incident @ 1515; upon injection of iv contrast per CTPA study, extravasation occurred, patient expressed discomfort at site (right ac), injection discontinued. Approximately 75 ml isovue 370 extravasated; iv was pulled, warm compress place at site, and arm was elevated; patient was taken back to room and RN was notified of extravasation.

## 2024-02-27 NOTE — PROGRESS NOTES
Outpatient Medications   Medication Sig Dispense Refill    amLODIPine (NORVASC) 10 MG tablet Take 0.5 tablets by mouth daily 30 tablet 3    psyllium (METAMUCIL) 58.12 % PACK packet Take 1 packet by mouth daily 30 packet 0    docusate sodium (COLACE, DULCOLAX) 100 MG CAPS Take 100 mg by mouth daily 30 capsule 0    ELIQUIS 2.5 MG TABS tablet Take 1 tablet by mouth 2 times daily      metoprolol tartrate (LOPRESSOR) 25 MG tablet Take 1 tablet by mouth in the morning and at bedtime 60 tablet 1    aspirin 81 MG chewable tablet Take 1 tablet by mouth daily Resume ASA on Monday. Monitor for any recurrent GI bleed.      lactobacillus (CULTURELLE) capsule Take 1 capsule by mouth daily (with breakfast)      atorvastatin (LIPITOR) 40 MG tablet Take 1 tablet by mouth at bedtime      busPIRone (BUSPAR) 5 MG tablet Take 1 tablet by mouth every 8 hours as needed (anxiety)      magnesium oxide (MAG-OX) 400 (240 Mg) MG tablet Take 1 tablet by mouth daily 30 tablet 0    pantoprazole (PROTONIX) 40 MG tablet Take 1 tablet by mouth daily 30 tablet 3    torsemide (DEMADEX) 20 MG tablet Take 1 tablet by mouth daily      Fluticasone-Umeclidin-Vilant (TRELEGY ELLIPTA) 200-62.5-25 MCG/INH AEPB Inhale 1 puff into the lungs daily      albuterol (PROVENTIL) (2.5 MG/3ML) 0.083% nebulizer solution Take 3 mLs by nebulization every 4 hours as needed for Wheezing      guaiFENesin (MUCINEX) 600 MG extended release tablet Take 1 tablet by mouth daily      Roflumilast (DALIRESP) 500 MCG tablet Take 1 tablet by mouth daily      OXYGEN Inhale 2 L into the lungs 2-3      fluticasone (FLONASE) 50 MCG/ACT nasal spray 1 spray by Each Nostril route daily as needed for Rhinitis 16 g 0    ferrous sulfate (IRON 325) 325 (65 Fe) MG tablet Take 1 tablet by mouth daily (with breakfast)      acetaminophen (TYLENOL) 500 MG tablet Take 1 tablet by mouth every 6 hours as needed for Pain      ondansetron (ZOFRAN) 4 MG tablet Take 1 tablet by mouth every 8 hours as needed

## 2024-02-28 NOTE — ED NOTES
Pt transported back to [facility] with [facility]. Called crow at [facility] and let her know the patient was leaving the facility and to expect her shortly. SUJIT Culver gave report to dayshift nurse at [facility].

## 2024-02-28 NOTE — ED PROVIDER NOTES
This patient was signed out to me at 1600 Please reference the off going provider's note for initial assessment and plan. This patient is signed out pending the following:    - reassess clinical condition  - review workup  - determine disposition  -Follow-up CTPA    Special discussion: Anticipate discharge with treatment for pneumonia as well as COPD exacerbation    The remainder of the patient's ED course is as follows:       1600  I reassessed the patient she is resting comfortably no acute distress.  Oxygenating well on her home supplementation.  On reassessment her breath sounds are normal she has no increased work of breathing she states she is feeling better.  Her heart tones are unremarkable abdomen is soft nontender nondistended extremities are warm and well-perfused.  At this time her working diagnosis is COPD exacerbation possibly complicated by pneumonia given hemoptysis and Pro-Jignesh.  Of note she had a extravasation of saline in the left upper extremity in anticipation for CTPA.  She did not receive contrast into the arm.  She is with a neurovascular intact exam distal to the extravasation no high risk features routine care at this time.  We are awaiting results of her CTPA to better clarify her disposition    1824  The patient is reassessed she is resting comfortably no increased work of breathing or respiratory distress.  She is oxygenating adequately on her baseline supplementation.  Her CTPA is resulted without evidence of pulmonary embolism.  Continues to raise concern for pneumonia.  She has no evidence of sepsis.  Overall her presentation likely represents a pneumonia on a background of COPD amenable to routine outpatient care.  I have calculated a PSI score 82 class III.  The patient is discharged to acute rehab which I believe is an appropriate site of care and support of her recovery.  The patient is agreeable to this disposition.  This concludes her ED course     Seamus Hairston MD  02/27/24 
shows no evidence of pneumonia, pneumothorax, pleural effusion, pulmonary edema    Interpretation per the Radiologist below, if available at the time of this note:    CT CHEST PULMONARY EMBOLISM W CONTRAST      XR CHEST PORTABLE   Final Result   No acute process.           EMERGENCY DEPARTMENT COURSE and DIFFERENTIAL DIAGNOSIS/MDM:   Patient seen and evaluated. Old records reviewed and pertinent information included in HPI. Labs and imaging reviewed and results discussed with patient.      Overall chronically ill appearing patient, in no acute distress, presenting for shortness of breath and cough.  Patient presented to the emergency department due to a small amount of hemoptysis yesterday, none today.  History obtained from patient.  Physical exam remarkable for diminished breath sounds bilaterally    Patient's pertinent external medical records: Patient recently seen in the emergency department earlier this month for COPD    Chronic Medical Conditions that may contribute to presentation today:  has a past medical history of NADJA (acute kidney injury) ([facility]), Asthma, Atrial fibrillation with RVR ([facility]), CAD (coronary artery disease), CHF (congestive heart failure) ([facility]), Chronic anxiety, COPD (chronic obstructive pulmonary disease) ([facility]), COPD (chronic obstructive pulmonary disease) ([facility]) (08/19/2023), GERD (gastroesophageal reflux disease) (09/09/2021), Heart attack ([facility]) (2019), History of blood transfusion, Hyperlipidemia, Hypertension, MRSA (methicillin resistant staph aureus) culture positive (09/22/2019), OA (osteoarthritis) (08/12/2014), and Thyroid disease.     Social Determinants affecting Dx or Tx:    Social History     Socioeconomic History    Marital status:      Spouse name: Not on file    Number of children: 4    Years of education: Not on file    Highest education level: Not on file   Occupational History    Not on file   Tobacco Use    Smoking status: Former     Current packs/day: 0.00

## 2024-06-06 ENCOUNTER — HOSPITAL ENCOUNTER (INPATIENT)
Age: 84
LOS: 1 days | Discharge: HOME HEALTH | DRG: 880 | End: 2024-06-10
Attending: EMERGENCY MEDICINE | Admitting: INTERNAL MEDICINE
Payer: MEDICARE

## 2024-06-06 ENCOUNTER — APPOINTMENT (OUTPATIENT)
Dept: GENERAL RADIOLOGY | Age: 84
DRG: 880 | End: 2024-06-06
Payer: MEDICARE

## 2024-06-06 DIAGNOSIS — R07.9 CHEST PAIN, UNSPECIFIED TYPE: Primary | ICD-10-CM

## 2024-06-06 PROBLEM — I25.119 CHEST PAIN DUE TO CAD (HCC): Status: ACTIVE | Noted: 2024-06-06

## 2024-06-06 LAB
ALBUMIN SERPL-MCNC: 3.6 G/DL (ref 3.4–5)
ALBUMIN/GLOB SERPL: 1.3 {RATIO} (ref 1.1–2.2)
ALP SERPL-CCNC: 69 U/L (ref 40–129)
ALT SERPL-CCNC: 7 U/L (ref 10–40)
ANION GAP SERPL CALCULATED.3IONS-SCNC: 9 MMOL/L (ref 3–16)
AST SERPL-CCNC: 15 U/L (ref 15–37)
BASOPHILS # BLD: 0.1 K/UL (ref 0–0.2)
BASOPHILS NFR BLD: 0.7 %
BILIRUB SERPL-MCNC: 0.3 MG/DL (ref 0–1)
BUN SERPL-MCNC: 29 MG/DL (ref 7–20)
CALCIUM SERPL-MCNC: 9.2 MG/DL (ref 8.3–10.6)
CHLORIDE SERPL-SCNC: 97 MMOL/L (ref 99–110)
CO2 SERPL-SCNC: 35 MMOL/L (ref 21–32)
CREAT SERPL-MCNC: 1.3 MG/DL (ref 0.6–1.2)
DEPRECATED RDW RBC AUTO: 16.1 % (ref 12.4–15.4)
EOSINOPHIL # BLD: 0.2 K/UL (ref 0–0.6)
EOSINOPHIL NFR BLD: 2.1 %
GFR SERPLBLD CREATININE-BSD FMLA CKD-EPI: 41 ML/MIN/{1.73_M2}
GLUCOSE SERPL-MCNC: 105 MG/DL (ref 70–99)
HCT VFR BLD AUTO: 33.5 % (ref 36–48)
HGB BLD-MCNC: 11 G/DL (ref 12–16)
LYMPHOCYTES # BLD: 2.3 K/UL (ref 1–5.1)
LYMPHOCYTES NFR BLD: 23.7 %
MCH RBC QN AUTO: 29.8 PG (ref 26–34)
MCHC RBC AUTO-ENTMCNC: 32.7 G/DL (ref 31–36)
MCV RBC AUTO: 91 FL (ref 80–100)
MONOCYTES # BLD: 0.6 K/UL (ref 0–1.3)
MONOCYTES NFR BLD: 6.5 %
NEUTROPHILS # BLD: 6.5 K/UL (ref 1.7–7.7)
NEUTROPHILS NFR BLD: 67 %
PLATELET # BLD AUTO: 209 K/UL (ref 135–450)
PMV BLD AUTO: 7.6 FL (ref 5–10.5)
POTASSIUM SERPL-SCNC: 4.2 MMOL/L (ref 3.5–5.1)
PROT SERPL-MCNC: 6.4 G/DL (ref 6.4–8.2)
RBC # BLD AUTO: 3.68 M/UL (ref 4–5.2)
SODIUM SERPL-SCNC: 141 MMOL/L (ref 136–145)
TROPONIN, HIGH SENSITIVITY: 51 NG/L (ref 0–14)
TROPONIN, HIGH SENSITIVITY: 51 NG/L (ref 0–14)
WBC # BLD AUTO: 9.6 K/UL (ref 4–11)

## 2024-06-06 PROCEDURE — 96360 HYDRATION IV INFUSION INIT: CPT

## 2024-06-06 PROCEDURE — 93005 ELECTROCARDIOGRAM TRACING: CPT | Performed by: EMERGENCY MEDICINE

## 2024-06-06 PROCEDURE — 85025 COMPLETE CBC W/AUTO DIFF WBC: CPT

## 2024-06-06 PROCEDURE — 6370000000 HC RX 637 (ALT 250 FOR IP): Performed by: EMERGENCY MEDICINE

## 2024-06-06 PROCEDURE — 2580000003 HC RX 258: Performed by: EMERGENCY MEDICINE

## 2024-06-06 PROCEDURE — 99285 EMERGENCY DEPT VISIT HI MDM: CPT

## 2024-06-06 PROCEDURE — 84484 ASSAY OF TROPONIN QUANT: CPT

## 2024-06-06 PROCEDURE — 80053 COMPREHEN METABOLIC PANEL: CPT

## 2024-06-06 PROCEDURE — 2700000000 HC OXYGEN THERAPY PER DAY

## 2024-06-06 PROCEDURE — 93005 ELECTROCARDIOGRAM TRACING: CPT | Performed by: STUDENT IN AN ORGANIZED HEALTH CARE EDUCATION/TRAINING PROGRAM

## 2024-06-06 PROCEDURE — 71046 X-RAY EXAM CHEST 2 VIEWS: CPT

## 2024-06-06 PROCEDURE — 94761 N-INVAS EAR/PLS OXIMETRY MLT: CPT

## 2024-06-06 PROCEDURE — G0378 HOSPITAL OBSERVATION PER HR: HCPCS

## 2024-06-06 RX ORDER — 0.9 % SODIUM CHLORIDE 0.9 %
500 INTRAVENOUS SOLUTION INTRAVENOUS ONCE
Status: COMPLETED | OUTPATIENT
Start: 2024-06-06 | End: 2024-06-06

## 2024-06-06 RX ORDER — BUSPIRONE HYDROCHLORIDE 5 MG/1
10 TABLET ORAL ONCE
Status: COMPLETED | OUTPATIENT
Start: 2024-06-06 | End: 2024-06-06

## 2024-06-06 RX ADMIN — SODIUM CHLORIDE 500 ML: 9 INJECTION, SOLUTION INTRAVENOUS at 21:57

## 2024-06-06 RX ADMIN — BUSPIRONE HYDROCHLORIDE 10 MG: 5 TABLET ORAL at 23:32

## 2024-06-06 ASSESSMENT — PAIN SCALES - GENERAL: PAINLEVEL_OUTOF10: 5

## 2024-06-06 ASSESSMENT — HEART SCORE: ECG: NON-SPECIFC REPOLARIZATION DISTURBANCE/LBTB/PM

## 2024-06-06 ASSESSMENT — PAIN - FUNCTIONAL ASSESSMENT: PAIN_FUNCTIONAL_ASSESSMENT: 0-10

## 2024-06-07 ENCOUNTER — APPOINTMENT (OUTPATIENT)
Age: 84
DRG: 880 | End: 2024-06-07
Payer: MEDICARE

## 2024-06-07 ENCOUNTER — APPOINTMENT (OUTPATIENT)
Dept: NUCLEAR MEDICINE | Age: 84
DRG: 880 | End: 2024-06-07
Payer: MEDICARE

## 2024-06-07 LAB
ANION GAP SERPL CALCULATED.3IONS-SCNC: 12 MMOL/L (ref 3–16)
BUN SERPL-MCNC: 29 MG/DL (ref 7–20)
CALCIUM SERPL-MCNC: 9.2 MG/DL (ref 8.3–10.6)
CHLORIDE SERPL-SCNC: 98 MMOL/L (ref 99–110)
CHOLEST SERPL-MCNC: 156 MG/DL (ref 0–199)
CO2 SERPL-SCNC: 33 MMOL/L (ref 21–32)
CREAT SERPL-MCNC: 1.6 MG/DL (ref 0.6–1.2)
DEPRECATED RDW RBC AUTO: 16.1 % (ref 12.4–15.4)
ECHO BSA: 1.4 M2
GFR SERPLBLD CREATININE-BSD FMLA CKD-EPI: 32 ML/MIN/{1.73_M2}
GLUCOSE SERPL-MCNC: 92 MG/DL (ref 70–99)
HCT VFR BLD AUTO: 34.5 % (ref 36–48)
HDLC SERPL-MCNC: 53 MG/DL (ref 40–60)
HGB BLD-MCNC: 10.8 G/DL (ref 12–16)
LDLC SERPL CALC-MCNC: 70 MG/DL
MCH RBC QN AUTO: 29.1 PG (ref 26–34)
MCHC RBC AUTO-ENTMCNC: 31.3 G/DL (ref 31–36)
MCV RBC AUTO: 92.9 FL (ref 80–100)
NUC STRESS EJECTION FRACTION: 73 %
NUC STRESS LV EDV: 51 ML (ref 56–104)
NUC STRESS LV ESV: 14 ML (ref 19–49)
NUC STRESS LV MASS: 90 G
PLATELET # BLD AUTO: 196 K/UL (ref 135–450)
PMV BLD AUTO: 7.9 FL (ref 5–10.5)
POTASSIUM SERPL-SCNC: 4.1 MMOL/L (ref 3.5–5.1)
RBC # BLD AUTO: 3.71 M/UL (ref 4–5.2)
SODIUM SERPL-SCNC: 143 MMOL/L (ref 136–145)
STRESS BASELINE HR: 84 BPM
STRESS ESTIMATED WORKLOAD: 1 METS
STRESS PEAK DIAS BP: 79 MMHG
STRESS PEAK SYS BP: 153 MMHG
STRESS PERCENT HR ACHIEVED: 85 %
STRESS POST PEAK HR: 117 BPM
STRESS RATE PRESSURE PRODUCT: ABNORMAL BPM*MMHG
STRESS STAGE 1 BP: ABNORMAL MMHG
STRESS STAGE 1 COMMENTS: ABNORMAL
STRESS STAGE 1 DURATION: 1 MIN:SEC
STRESS STAGE 1 HR: 101 BPM
STRESS STAGE RECOVERY 1 BP: ABNORMAL MMHG
STRESS STAGE RECOVERY 1 DURATION: 1 MIN:SEC
STRESS STAGE RECOVERY 1 HR: 111 BPM
STRESS STAGE RECOVERY 2 BP: ABNORMAL MMHG
STRESS STAGE RECOVERY 2 DURATION: 2 MIN:SEC
STRESS STAGE RECOVERY 2 HR: 117 BPM
STRESS STAGE RECOVERY 3 BP: ABNORMAL MMHG
STRESS STAGE RECOVERY 3 DURATION: 4 MIN:SEC
STRESS STAGE RECOVERY 3 HR: 104 BPM
STRESS STAGE RECOVERY 4 BP: ABNORMAL MMHG
STRESS STAGE RECOVERY 4 DURATION: 5 MIN:SEC
STRESS STAGE RECOVERY 4 HR: 86 BPM
STRESS TARGET HR: 138 BPM
TID: 0.93
TRIGL SERPL-MCNC: 164 MG/DL (ref 0–150)
TROPONIN, HIGH SENSITIVITY: 52 NG/L (ref 0–14)
TROPONIN, HIGH SENSITIVITY: 52 NG/L (ref 0–14)
VLDLC SERPL CALC-MCNC: 33 MG/DL
WBC # BLD AUTO: 8.5 K/UL (ref 4–11)

## 2024-06-07 PROCEDURE — 6360000002 HC RX W HCPCS

## 2024-06-07 PROCEDURE — 80048 BASIC METABOLIC PNL TOTAL CA: CPT

## 2024-06-07 PROCEDURE — 6370000000 HC RX 637 (ALT 250 FOR IP): Performed by: STUDENT IN AN ORGANIZED HEALTH CARE EDUCATION/TRAINING PROGRAM

## 2024-06-07 PROCEDURE — G0378 HOSPITAL OBSERVATION PER HR: HCPCS

## 2024-06-07 PROCEDURE — A9502 TC99M TETROFOSMIN: HCPCS | Performed by: INTERNAL MEDICINE

## 2024-06-07 PROCEDURE — 94640 AIRWAY INHALATION TREATMENT: CPT

## 2024-06-07 PROCEDURE — 78452 HT MUSCLE IMAGE SPECT MULT: CPT | Performed by: INTERNAL MEDICINE

## 2024-06-07 PROCEDURE — 6370000000 HC RX 637 (ALT 250 FOR IP)

## 2024-06-07 PROCEDURE — 96361 HYDRATE IV INFUSION ADD-ON: CPT

## 2024-06-07 PROCEDURE — 36415 COLL VENOUS BLD VENIPUNCTURE: CPT

## 2024-06-07 PROCEDURE — 80061 LIPID PANEL: CPT

## 2024-06-07 PROCEDURE — 6360000002 HC RX W HCPCS: Performed by: INTERNAL MEDICINE

## 2024-06-07 PROCEDURE — 94761 N-INVAS EAR/PLS OXIMETRY MLT: CPT

## 2024-06-07 PROCEDURE — 85027 COMPLETE CBC AUTOMATED: CPT

## 2024-06-07 PROCEDURE — 84484 ASSAY OF TROPONIN QUANT: CPT

## 2024-06-07 PROCEDURE — 2700000000 HC OXYGEN THERAPY PER DAY

## 2024-06-07 PROCEDURE — 93016 CV STRESS TEST SUPVJ ONLY: CPT | Performed by: INTERNAL MEDICINE

## 2024-06-07 PROCEDURE — 2580000003 HC RX 258

## 2024-06-07 PROCEDURE — 93018 CV STRESS TEST I&R ONLY: CPT | Performed by: INTERNAL MEDICINE

## 2024-06-07 PROCEDURE — 3430000000 HC RX DIAGNOSTIC RADIOPHARMACEUTICAL: Performed by: INTERNAL MEDICINE

## 2024-06-07 PROCEDURE — 78452 HT MUSCLE IMAGE SPECT MULT: CPT

## 2024-06-07 PROCEDURE — 93017 CV STRESS TEST TRACING ONLY: CPT

## 2024-06-07 RX ORDER — SODIUM CHLORIDE 0.9 % (FLUSH) 0.9 %
5-40 SYRINGE (ML) INJECTION PRN
Status: DISCONTINUED | OUTPATIENT
Start: 2024-06-07 | End: 2024-06-10 | Stop reason: HOSPADM

## 2024-06-07 RX ORDER — BUSPIRONE HYDROCHLORIDE 5 MG/1
5 TABLET ORAL EVERY 8 HOURS PRN
Status: DISCONTINUED | OUTPATIENT
Start: 2024-06-07 | End: 2024-06-10 | Stop reason: HOSPADM

## 2024-06-07 RX ORDER — ATORVASTATIN CALCIUM 40 MG/1
40 TABLET, FILM COATED ORAL NIGHTLY
Status: DISCONTINUED | OUTPATIENT
Start: 2024-06-07 | End: 2024-06-10 | Stop reason: HOSPADM

## 2024-06-07 RX ORDER — SODIUM CHLORIDE 9 MG/ML
INJECTION, SOLUTION INTRAVENOUS PRN
Status: DISCONTINUED | OUTPATIENT
Start: 2024-06-07 | End: 2024-06-10 | Stop reason: HOSPADM

## 2024-06-07 RX ORDER — ALBUTEROL SULFATE 2.5 MG/3ML
2.5 SOLUTION RESPIRATORY (INHALATION) EVERY 4 HOURS PRN
Status: DISCONTINUED | OUTPATIENT
Start: 2024-06-07 | End: 2024-06-07

## 2024-06-07 RX ORDER — REGADENOSON 0.08 MG/ML
0.4 INJECTION, SOLUTION INTRAVENOUS
Status: COMPLETED | OUTPATIENT
Start: 2024-06-07 | End: 2024-06-07

## 2024-06-07 RX ORDER — SODIUM CHLORIDE 0.9 % (FLUSH) 0.9 %
5-40 SYRINGE (ML) INJECTION EVERY 12 HOURS SCHEDULED
Status: DISCONTINUED | OUTPATIENT
Start: 2024-06-07 | End: 2024-06-10 | Stop reason: HOSPADM

## 2024-06-07 RX ORDER — SODIUM CHLORIDE 9 MG/ML
INJECTION, SOLUTION INTRAVENOUS CONTINUOUS
Status: DISCONTINUED | OUTPATIENT
Start: 2024-06-07 | End: 2024-06-07

## 2024-06-07 RX ORDER — OXYCODONE HYDROCHLORIDE 5 MG/1
5 TABLET ORAL ONCE
Status: COMPLETED | OUTPATIENT
Start: 2024-06-07 | End: 2024-06-07

## 2024-06-07 RX ORDER — PANTOPRAZOLE SODIUM 40 MG/1
40 TABLET, DELAYED RELEASE ORAL DAILY
Status: DISCONTINUED | OUTPATIENT
Start: 2024-06-07 | End: 2024-06-10 | Stop reason: HOSPADM

## 2024-06-07 RX ORDER — ACETAMINOPHEN 325 MG/1
650 TABLET ORAL EVERY 6 HOURS PRN
Status: DISCONTINUED | OUTPATIENT
Start: 2024-06-07 | End: 2024-06-10 | Stop reason: HOSPADM

## 2024-06-07 RX ORDER — GUAIFENESIN 600 MG/1
600 TABLET, EXTENDED RELEASE ORAL DAILY
Status: DISCONTINUED | OUTPATIENT
Start: 2024-06-07 | End: 2024-06-10 | Stop reason: HOSPADM

## 2024-06-07 RX ORDER — REGADENOSON 0.08 MG/ML
0.4 INJECTION, SOLUTION INTRAVENOUS
Status: CANCELLED | OUTPATIENT
Start: 2024-06-07

## 2024-06-07 RX ORDER — ALBUTEROL SULFATE 2.5 MG/3ML
2.5 SOLUTION RESPIRATORY (INHALATION)
Status: DISCONTINUED | OUTPATIENT
Start: 2024-06-07 | End: 2024-06-10 | Stop reason: HOSPADM

## 2024-06-07 RX ORDER — POTASSIUM CHLORIDE 7.45 MG/ML
10 INJECTION INTRAVENOUS PRN
Status: DISCONTINUED | OUTPATIENT
Start: 2024-06-07 | End: 2024-06-07

## 2024-06-07 RX ORDER — FLUTICASONE PROPIONATE 50 MCG
1 SPRAY, SUSPENSION (ML) NASAL DAILY PRN
Status: DISCONTINUED | OUTPATIENT
Start: 2024-06-07 | End: 2024-06-10 | Stop reason: HOSPADM

## 2024-06-07 RX ORDER — ONDANSETRON 4 MG/1
4 TABLET, ORALLY DISINTEGRATING ORAL EVERY 8 HOURS PRN
Status: DISCONTINUED | OUTPATIENT
Start: 2024-06-07 | End: 2024-06-10 | Stop reason: HOSPADM

## 2024-06-07 RX ORDER — ASPIRIN 81 MG/1
81 TABLET, CHEWABLE ORAL DAILY
Status: DISCONTINUED | OUTPATIENT
Start: 2024-06-07 | End: 2024-06-10 | Stop reason: HOSPADM

## 2024-06-07 RX ORDER — ONDANSETRON 2 MG/ML
4 INJECTION INTRAMUSCULAR; INTRAVENOUS EVERY 6 HOURS PRN
Status: DISCONTINUED | OUTPATIENT
Start: 2024-06-07 | End: 2024-06-10 | Stop reason: HOSPADM

## 2024-06-07 RX ORDER — POLYETHYLENE GLYCOL 3350 17 G/17G
17 POWDER, FOR SOLUTION ORAL DAILY PRN
Status: DISCONTINUED | OUTPATIENT
Start: 2024-06-07 | End: 2024-06-10 | Stop reason: HOSPADM

## 2024-06-07 RX ORDER — ACETAMINOPHEN 650 MG/1
650 SUPPOSITORY RECTAL EVERY 6 HOURS PRN
Status: DISCONTINUED | OUTPATIENT
Start: 2024-06-07 | End: 2024-06-10 | Stop reason: HOSPADM

## 2024-06-07 RX ADMIN — ACETAMINOPHEN 650 MG: 325 TABLET ORAL at 04:08

## 2024-06-07 RX ADMIN — PANTOPRAZOLE SODIUM 40 MG: 40 TABLET, DELAYED RELEASE ORAL at 09:49

## 2024-06-07 RX ADMIN — GUAIFENESIN 600 MG: 600 TABLET ORAL at 09:49

## 2024-06-07 RX ADMIN — TETROFOSMIN 10.6 MILLICURIE: 1.38 INJECTION, POWDER, LYOPHILIZED, FOR SOLUTION INTRAVENOUS at 12:25

## 2024-06-07 RX ADMIN — TETROFOSMIN 32 MILLICURIE: 1.38 INJECTION, POWDER, LYOPHILIZED, FOR SOLUTION INTRAVENOUS at 14:37

## 2024-06-07 RX ADMIN — ALBUTEROL SULFATE 2.5 MG: 2.5 SOLUTION RESPIRATORY (INHALATION) at 20:17

## 2024-06-07 RX ADMIN — Medication 2 PUFF: at 20:17

## 2024-06-07 RX ADMIN — REGADENOSON 0.4 MG: 0.08 INJECTION, SOLUTION INTRAVENOUS at 14:37

## 2024-06-07 RX ADMIN — ATORVASTATIN CALCIUM 40 MG: 40 TABLET, FILM COATED ORAL at 01:53

## 2024-06-07 RX ADMIN — APIXABAN 2.5 MG: 2.5 TABLET, FILM COATED ORAL at 20:08

## 2024-06-07 RX ADMIN — SODIUM CHLORIDE, PRESERVATIVE FREE 10 ML: 5 INJECTION INTRAVENOUS at 20:10

## 2024-06-07 RX ADMIN — Medication 2 PUFF: at 08:58

## 2024-06-07 RX ADMIN — ALBUTEROL SULFATE 2.5 MG: 2.5 SOLUTION RESPIRATORY (INHALATION) at 14:13

## 2024-06-07 RX ADMIN — TIOTROPIUM BROMIDE INHALATION SPRAY 2 PUFF: 3.12 SPRAY, METERED RESPIRATORY (INHALATION) at 08:56

## 2024-06-07 RX ADMIN — BUSPIRONE HYDROCHLORIDE 5 MG: 5 TABLET ORAL at 20:08

## 2024-06-07 RX ADMIN — ALBUTEROL SULFATE 2.5 MG: 2.5 SOLUTION RESPIRATORY (INHALATION) at 02:23

## 2024-06-07 RX ADMIN — APIXABAN 2.5 MG: 2.5 TABLET, FILM COATED ORAL at 01:53

## 2024-06-07 RX ADMIN — BUSPIRONE HYDROCHLORIDE 5 MG: 5 TABLET ORAL at 12:17

## 2024-06-07 RX ADMIN — OXYCODONE HYDROCHLORIDE 5 MG: 5 TABLET ORAL at 20:08

## 2024-06-07 RX ADMIN — ASPIRIN 81 MG 81 MG: 81 TABLET ORAL at 09:49

## 2024-06-07 RX ADMIN — SODIUM CHLORIDE: 9 INJECTION, SOLUTION INTRAVENOUS at 01:56

## 2024-06-07 RX ADMIN — ATORVASTATIN CALCIUM 40 MG: 40 TABLET, FILM COATED ORAL at 20:08

## 2024-06-07 ASSESSMENT — PAIN DESCRIPTION - DESCRIPTORS
DESCRIPTORS: ACHING;SPASM;THROBBING
DESCRIPTORS: ACHING;THROBBING;SPASM
DESCRIPTORS: ACHING

## 2024-06-07 ASSESSMENT — PAIN SCALES - WONG BAKER
WONGBAKER_NUMERICALRESPONSE: NO HURT

## 2024-06-07 ASSESSMENT — PAIN DESCRIPTION - ORIENTATION
ORIENTATION: RIGHT;LEFT
ORIENTATION: RIGHT
ORIENTATION: RIGHT;LEFT

## 2024-06-07 ASSESSMENT — PAIN SCALES - GENERAL
PAINLEVEL_OUTOF10: 5
PAINLEVEL_OUTOF10: 9
PAINLEVEL_OUTOF10: 9

## 2024-06-07 ASSESSMENT — PAIN DESCRIPTION - LOCATION
LOCATION: BACK;LEG
LOCATION: BACK;ARM
LOCATION: BACK;LEG

## 2024-06-07 ASSESSMENT — PAIN - FUNCTIONAL ASSESSMENT: PAIN_FUNCTIONAL_ASSESSMENT: ACTIVITIES ARE NOT PREVENTED

## 2024-06-07 NOTE — ED NOTES
Ms. Smith is a 83 y.o. female who had concerns including Chest Pain (Pt complains of chest pain starting this afternoon, rates it a 5/10. SOB, wears 2 1/2 L at home. Received 324 mg ASA at home. Heart Cath in 2019/).    Chief Complaint   Patient presents with    Chest Pain     Pt complains of chest pain starting this afternoon, rates it a 5/10. SOB, wears 2 1/2 L at home. Received 324 mg ASA at home. Heart Cath in 2019         She is being admitted for:    Chest pain due to CAD (HCC)    Her ED problem list included:    1. Chest pain, unspecified type        Past Medical History:   Diagnosis Date    NADJA (acute kidney injury) (HCC)     Asthma     Atrial fibrillation with RVR (HCC)     CAD (coronary artery disease)     CHF (congestive heart failure) (HCC)     unsure    Chronic anxiety     COPD (chronic obstructive pulmonary disease) (HCC)     COPD (chronic obstructive pulmonary disease) (HCC) 2023    GERD (gastroesophageal reflux disease) 2021    Heart attack (HCC) 2019    History of blood transfusion     Hyperlipidemia     Hypertension     MRSA (methicillin resistant staph aureus) culture positive 2019    + resp cx    OA (osteoarthritis) 2014    Thyroid disease        Past Surgical History:   Procedure Laterality Date    BRONCHOSCOPY  2019    Dr. Wheatley - w/BAL    CARDIAC CATHETERIZATION  2019    Dr. Palomares     SECTION      COLONOSCOPY N/A 2020    COLONOSCOPY DIAGNOSTIC performed by Rowdy Schmitz DO at [facility] ENDOSCOPY    COLONOSCOPY N/A 10/22/2021    COLONOSCOPY POLYPECTOMY SNARE/COLD BIOPSY [facility]    COLONOSCOPY N/A 2023    COLONOSCOPY POLYPECTOMY SNARE performed by Darron Langford MD at [facility] ENDOSCOPY    CORONARY ARTERY BYPASS GRAFT N/A 2019    OFF PUMP CORONARY ARTERY BYPASS GRAFTING X2, INTERNAL MAMMARY ARTERY, SAPHENOUS VEIN GRAFT performed by Yolanda Chase MD at [facility] CVOR    IR

## 2024-06-07 NOTE — ACP (ADVANCE CARE PLANNING)
Advance Care Planning     General Advance Care Planning (ACP) Conversation    Date of Conversation: 6/6/2024  Conducted with: Patient with Decision Making Capacity  Other persons present: Son      Healthcare Decision Maker:    Primary Decision Maker: Juan Smith - Child - [phone]    Secondary Decision Maker: Hafsa Tapia - Niece/Nephew - [phone]  Click here to complete Healthcare Decision Makers including selection of the Healthcare Decision Maker Relationship (ie \"Primary\").  Today we documented Decision Maker(s) consistent with Legal Next of Kin hierarchy.    Content/Action Overview:  Has NO ACP documents-Information provided  Reviewed DNR/DNI and patient elects Full Code (Attempt Resuscitation)      Length of Voluntary ACP Conversation in minutes:  <16 minutes (Non-Billable)    Ashly Wilson RN

## 2024-06-07 NOTE — H&P
V2.0  History and Physical      Name:  Terri Smith /Age/Sex: 1940  (83 y.o. female)   MRN & CSN:  [id number] & [id number] Encounter Date/Time: 2024 11:23 PM EDT   Location:   PCP: Jackson Ontiveros MD       Hospital Day: 1    Assessment and Plan:   Terri Smith is a 83 y.o. female with a pmh of CAD s/p CABG X2, HLD, asthma, former smoker, COPD on home oxygen, CKD stage III A, HTN, NEVA, PAF, GERD who presents with Chest pain due to CAD (HCC) and NADJA.    Hospital Problems             Last Modified POA    * (Principal) Chest pain due to CAD (HCC) 2024 Yes    Chest pain 2024 Yes    Hyperlipidemia 2024 Yes    Asthma 2024 Yes    Former smoker 2024 Yes    COPD exacerbation (HCC) 2024 Yes    CAD (coronary artery disease) 2024 Yes    NADJA (acute kidney injury) (HCC) 2024 Yes    S/P CABG x 2 2024 Yes    Primary hypertension 2024 Yes    GERD (gastroesophageal reflux disease) 2024 Yes    Chronic anxiety 2024 Yes    Chronic atrial fibrillation (HCC) 2024 Yes    Stage 3a chronic kidney disease (HCC) 2024 Yes       Chest pain rule out ACS/CAD/s/p CABG X2  `-Place on observation on telemetry unit.   -Has no evidence of acute ischemic changes on initial EKG. Will repeat in am and PRN for chest pain.   -Troponin abnormal initially. Will trend. Delta negative  -Will continue aspirin, hold BP meds as BP is on the lower side but continue statins.   -Will use morphine and nitro for pain relief.  -Lipid panel ordered for a.m.  -HGBa1c ordered  -Stress test in am  -ECHO will be ordered to check for LV function and valvular integrity.   COPD/asthma-not in exacerbation  -Supplemental oxygen to keep SaO2 above 92%.   -Respiratory eval and treat, Inhaled bronchodilators/ICS.  -Mucolytic.   -Incentive spirometry q 1 hour while awake.   -Use BiPAP if extremely short of breath or using accessory muscle and get an ABG prior to starting BiPAP and repeat ABG

## 2024-06-08 LAB
EKG ATRIAL RATE: 73 BPM
EKG ATRIAL RATE: 77 BPM
EKG ATRIAL RATE: 81 BPM
EKG DIAGNOSIS: NORMAL
EKG P AXIS: 75 DEGREES
EKG P AXIS: 79 DEGREES
EKG P AXIS: 82 DEGREES
EKG P-R INTERVAL: 170 MS
EKG P-R INTERVAL: 172 MS
EKG P-R INTERVAL: 178 MS
EKG Q-T INTERVAL: 388 MS
EKG Q-T INTERVAL: 390 MS
EKG Q-T INTERVAL: 406 MS
EKG QRS DURATION: 80 MS
EKG QRS DURATION: 84 MS
EKG QRS DURATION: 84 MS
EKG QTC CALCULATION (BAZETT): 427 MS
EKG QTC CALCULATION (BAZETT): 453 MS
EKG QTC CALCULATION (BAZETT): 459 MS
EKG R AXIS: -63 DEGREES
EKG R AXIS: -65 DEGREES
EKG R AXIS: -66 DEGREES
EKG T AXIS: 61 DEGREES
EKG T AXIS: 64 DEGREES
EKG T AXIS: 66 DEGREES
EKG VENTRICULAR RATE: 73 BPM
EKG VENTRICULAR RATE: 77 BPM
EKG VENTRICULAR RATE: 81 BPM

## 2024-06-08 PROCEDURE — 6370000000 HC RX 637 (ALT 250 FOR IP): Performed by: INTERNAL MEDICINE

## 2024-06-08 PROCEDURE — G0378 HOSPITAL OBSERVATION PER HR: HCPCS

## 2024-06-08 PROCEDURE — 94761 N-INVAS EAR/PLS OXIMETRY MLT: CPT

## 2024-06-08 PROCEDURE — 6370000000 HC RX 637 (ALT 250 FOR IP)

## 2024-06-08 PROCEDURE — 94640 AIRWAY INHALATION TREATMENT: CPT

## 2024-06-08 PROCEDURE — 2580000003 HC RX 258

## 2024-06-08 PROCEDURE — 2700000000 HC OXYGEN THERAPY PER DAY

## 2024-06-08 PROCEDURE — 6360000002 HC RX W HCPCS: Performed by: INTERNAL MEDICINE

## 2024-06-08 RX ORDER — OXYCODONE HYDROCHLORIDE AND ACETAMINOPHEN 5; 325 MG/1; MG/1
1 TABLET ORAL EVERY 4 HOURS PRN
Status: DISCONTINUED | OUTPATIENT
Start: 2024-06-08 | End: 2024-06-10 | Stop reason: HOSPADM

## 2024-06-08 RX ADMIN — SODIUM CHLORIDE, PRESERVATIVE FREE 10 ML: 5 INJECTION INTRAVENOUS at 09:30

## 2024-06-08 RX ADMIN — ALBUTEROL SULFATE 2.5 MG: 2.5 SOLUTION RESPIRATORY (INHALATION) at 15:28

## 2024-06-08 RX ADMIN — APIXABAN 2.5 MG: 2.5 TABLET, FILM COATED ORAL at 09:27

## 2024-06-08 RX ADMIN — ATORVASTATIN CALCIUM 40 MG: 40 TABLET, FILM COATED ORAL at 20:07

## 2024-06-08 RX ADMIN — PANTOPRAZOLE SODIUM 40 MG: 40 TABLET, DELAYED RELEASE ORAL at 09:27

## 2024-06-08 RX ADMIN — APIXABAN 2.5 MG: 2.5 TABLET, FILM COATED ORAL at 20:07

## 2024-06-08 RX ADMIN — ASPIRIN 81 MG 81 MG: 81 TABLET ORAL at 09:27

## 2024-06-08 RX ADMIN — BUSPIRONE HYDROCHLORIDE 5 MG: 5 TABLET ORAL at 04:08

## 2024-06-08 RX ADMIN — OXYCODONE AND ACETAMINOPHEN 1 TABLET: 5; 325 TABLET ORAL at 20:07

## 2024-06-08 RX ADMIN — ALBUTEROL SULFATE 2.5 MG: 2.5 SOLUTION RESPIRATORY (INHALATION) at 07:53

## 2024-06-08 RX ADMIN — BUSPIRONE HYDROCHLORIDE 5 MG: 5 TABLET ORAL at 18:53

## 2024-06-08 RX ADMIN — ACETAMINOPHEN 650 MG: 325 TABLET ORAL at 14:30

## 2024-06-08 RX ADMIN — TIOTROPIUM BROMIDE INHALATION SPRAY 2 PUFF: 3.12 SPRAY, METERED RESPIRATORY (INHALATION) at 07:53

## 2024-06-08 RX ADMIN — SODIUM CHLORIDE, PRESERVATIVE FREE 10 ML: 5 INJECTION INTRAVENOUS at 20:08

## 2024-06-08 RX ADMIN — GUAIFENESIN 600 MG: 600 TABLET ORAL at 09:27

## 2024-06-08 RX ADMIN — ALBUTEROL SULFATE 2.5 MG: 2.5 SOLUTION RESPIRATORY (INHALATION) at 20:11

## 2024-06-08 RX ADMIN — Medication 2 PUFF: at 20:11

## 2024-06-08 RX ADMIN — Medication 2 PUFF: at 07:54

## 2024-06-08 RX ADMIN — ALBUTEROL SULFATE 2.5 MG: 2.5 SOLUTION RESPIRATORY (INHALATION) at 12:02

## 2024-06-08 ASSESSMENT — PAIN SCALES - WONG BAKER
WONGBAKER_NUMERICALRESPONSE: NO HURT

## 2024-06-08 ASSESSMENT — PAIN DESCRIPTION - LOCATION
LOCATION: LEG
LOCATION: BACK
LOCATION: HEAD

## 2024-06-08 ASSESSMENT — PAIN SCALES - GENERAL
PAINLEVEL_OUTOF10: 5
PAINLEVEL_OUTOF10: 7
PAINLEVEL_OUTOF10: 4
PAINLEVEL_OUTOF10: 0

## 2024-06-08 ASSESSMENT — PAIN DESCRIPTION - ORIENTATION: ORIENTATION: RIGHT

## 2024-06-08 ASSESSMENT — PAIN DESCRIPTION - DESCRIPTORS: DESCRIPTORS: ACHING

## 2024-06-09 LAB
ANION GAP SERPL CALCULATED.3IONS-SCNC: 10 MMOL/L (ref 3–16)
BASOPHILS # BLD: 0.1 K/UL (ref 0–0.2)
BASOPHILS NFR BLD: 0.7 %
BUN SERPL-MCNC: 13 MG/DL (ref 7–20)
CALCIUM SERPL-MCNC: 9.5 MG/DL (ref 8.3–10.6)
CHLORIDE SERPL-SCNC: 101 MMOL/L (ref 99–110)
CO2 SERPL-SCNC: 29 MMOL/L (ref 21–32)
CREAT SERPL-MCNC: 0.9 MG/DL (ref 0.6–1.2)
DEPRECATED RDW RBC AUTO: 16.1 % (ref 12.4–15.4)
EOSINOPHIL # BLD: 0.2 K/UL (ref 0–0.6)
EOSINOPHIL NFR BLD: 2.1 %
GFR SERPLBLD CREATININE-BSD FMLA CKD-EPI: 64 ML/MIN/{1.73_M2}
GLUCOSE SERPL-MCNC: 87 MG/DL (ref 70–99)
HCT VFR BLD AUTO: 33.9 % (ref 36–48)
HGB BLD-MCNC: 10.8 G/DL (ref 12–16)
LYMPHOCYTES # BLD: 1.6 K/UL (ref 1–5.1)
LYMPHOCYTES NFR BLD: 19.4 %
MCH RBC QN AUTO: 29.6 PG (ref 26–34)
MCHC RBC AUTO-ENTMCNC: 32 G/DL (ref 31–36)
MCV RBC AUTO: 92.6 FL (ref 80–100)
MONOCYTES # BLD: 0.4 K/UL (ref 0–1.3)
MONOCYTES NFR BLD: 5.3 %
NEUTROPHILS # BLD: 5.9 K/UL (ref 1.7–7.7)
PLATELET # BLD AUTO: 164 K/UL (ref 135–450)
PMV BLD AUTO: 7.6 FL (ref 5–10.5)
POTASSIUM SERPL-SCNC: 4.6 MMOL/L (ref 3.5–5.1)
RBC # BLD AUTO: 3.66 M/UL (ref 4–5.2)
SODIUM SERPL-SCNC: 140 MMOL/L (ref 136–145)
WBC # BLD AUTO: 8.1 K/UL (ref 4–11)

## 2024-06-09 PROCEDURE — 94640 AIRWAY INHALATION TREATMENT: CPT

## 2024-06-09 PROCEDURE — 97165 OT EVAL LOW COMPLEX 30 MIN: CPT

## 2024-06-09 PROCEDURE — 36415 COLL VENOUS BLD VENIPUNCTURE: CPT

## 2024-06-09 PROCEDURE — 97530 THERAPEUTIC ACTIVITIES: CPT

## 2024-06-09 PROCEDURE — 6370000000 HC RX 637 (ALT 250 FOR IP): Performed by: NURSE PRACTITIONER

## 2024-06-09 PROCEDURE — G0378 HOSPITAL OBSERVATION PER HR: HCPCS

## 2024-06-09 PROCEDURE — 6360000002 HC RX W HCPCS: Performed by: INTERNAL MEDICINE

## 2024-06-09 PROCEDURE — 6370000000 HC RX 637 (ALT 250 FOR IP)

## 2024-06-09 PROCEDURE — 6370000000 HC RX 637 (ALT 250 FOR IP): Performed by: INTERNAL MEDICINE

## 2024-06-09 PROCEDURE — 85025 COMPLETE CBC W/AUTO DIFF WBC: CPT

## 2024-06-09 PROCEDURE — 2580000003 HC RX 258

## 2024-06-09 PROCEDURE — 2700000000 HC OXYGEN THERAPY PER DAY

## 2024-06-09 PROCEDURE — 1200000000 HC SEMI PRIVATE

## 2024-06-09 PROCEDURE — 94761 N-INVAS EAR/PLS OXIMETRY MLT: CPT

## 2024-06-09 PROCEDURE — 80048 BASIC METABOLIC PNL TOTAL CA: CPT

## 2024-06-09 RX ORDER — METHOCARBAMOL 750 MG/1
1500 TABLET, FILM COATED ORAL 4 TIMES DAILY
Status: DISCONTINUED | OUTPATIENT
Start: 2024-06-09 | End: 2024-06-10 | Stop reason: HOSPADM

## 2024-06-09 RX ORDER — HYDROXYZINE HYDROCHLORIDE 10 MG/1
25 TABLET, FILM COATED ORAL 3 TIMES DAILY PRN
Status: DISCONTINUED | OUTPATIENT
Start: 2024-06-09 | End: 2024-06-10 | Stop reason: HOSPADM

## 2024-06-09 RX ADMIN — Medication 2 PUFF: at 08:25

## 2024-06-09 RX ADMIN — SODIUM CHLORIDE, PRESERVATIVE FREE 10 ML: 5 INJECTION INTRAVENOUS at 19:47

## 2024-06-09 RX ADMIN — APIXABAN 2.5 MG: 2.5 TABLET, FILM COATED ORAL at 09:07

## 2024-06-09 RX ADMIN — GUAIFENESIN 600 MG: 600 TABLET ORAL at 09:07

## 2024-06-09 RX ADMIN — BUSPIRONE HYDROCHLORIDE 5 MG: 5 TABLET ORAL at 04:20

## 2024-06-09 RX ADMIN — METHOCARBAMOL 1500 MG: 750 TABLET ORAL at 19:47

## 2024-06-09 RX ADMIN — ALBUTEROL SULFATE 2.5 MG: 2.5 SOLUTION RESPIRATORY (INHALATION) at 19:29

## 2024-06-09 RX ADMIN — ASPIRIN 81 MG 81 MG: 81 TABLET ORAL at 09:07

## 2024-06-09 RX ADMIN — METHOCARBAMOL 1500 MG: 750 TABLET ORAL at 16:18

## 2024-06-09 RX ADMIN — METHOCARBAMOL 1500 MG: 750 TABLET ORAL at 12:55

## 2024-06-09 RX ADMIN — OXYCODONE AND ACETAMINOPHEN 1 TABLET: 5; 325 TABLET ORAL at 09:09

## 2024-06-09 RX ADMIN — ALBUTEROL SULFATE 2.5 MG: 2.5 SOLUTION RESPIRATORY (INHALATION) at 15:18

## 2024-06-09 RX ADMIN — APIXABAN 2.5 MG: 2.5 TABLET, FILM COATED ORAL at 20:33

## 2024-06-09 RX ADMIN — HYDROXYZINE HYDROCHLORIDE 25 MG: 10 TABLET ORAL at 19:47

## 2024-06-09 RX ADMIN — Medication 2 PUFF: at 19:32

## 2024-06-09 RX ADMIN — SODIUM CHLORIDE, PRESERVATIVE FREE 10 ML: 5 INJECTION INTRAVENOUS at 09:07

## 2024-06-09 RX ADMIN — PANTOPRAZOLE SODIUM 40 MG: 40 TABLET, DELAYED RELEASE ORAL at 09:07

## 2024-06-09 RX ADMIN — ALBUTEROL SULFATE 2.5 MG: 2.5 SOLUTION RESPIRATORY (INHALATION) at 08:25

## 2024-06-09 RX ADMIN — ACETAMINOPHEN 650 MG: 325 TABLET ORAL at 19:46

## 2024-06-09 RX ADMIN — ALBUTEROL SULFATE 2.5 MG: 2.5 SOLUTION RESPIRATORY (INHALATION) at 11:56

## 2024-06-09 RX ADMIN — BUSPIRONE HYDROCHLORIDE 5 MG: 5 TABLET ORAL at 16:15

## 2024-06-09 RX ADMIN — OXYCODONE AND ACETAMINOPHEN 1 TABLET: 5; 325 TABLET ORAL at 16:14

## 2024-06-09 RX ADMIN — ATORVASTATIN CALCIUM 40 MG: 40 TABLET, FILM COATED ORAL at 19:47

## 2024-06-09 RX ADMIN — TIOTROPIUM BROMIDE INHALATION SPRAY 2 PUFF: 3.12 SPRAY, METERED RESPIRATORY (INHALATION) at 08:25

## 2024-06-09 ASSESSMENT — PAIN DESCRIPTION - LOCATION
LOCATION: BACK
LOCATION: BACK

## 2024-06-09 ASSESSMENT — PAIN SCALES - GENERAL
PAINLEVEL_OUTOF10: 0
PAINLEVEL_OUTOF10: 5
PAINLEVEL_OUTOF10: 3
PAINLEVEL_OUTOF10: 6
PAINLEVEL_OUTOF10: 7

## 2024-06-10 ENCOUNTER — APPOINTMENT (OUTPATIENT)
Age: 84
DRG: 880 | End: 2024-06-10
Payer: MEDICARE

## 2024-06-10 VITALS
RESPIRATION RATE: 18 BRPM | BODY MASS INDEX: 17.75 KG/M2 | HEART RATE: 85 BPM | OXYGEN SATURATION: 95 % | WEIGHT: 104 LBS | SYSTOLIC BLOOD PRESSURE: 125 MMHG | HEIGHT: 64 IN | TEMPERATURE: 98 F | DIASTOLIC BLOOD PRESSURE: 67 MMHG

## 2024-06-10 LAB
ANION GAP SERPL CALCULATED.3IONS-SCNC: 11 MMOL/L (ref 3–16)
BASOPHILS # BLD: 0 K/UL (ref 0–0.2)
BASOPHILS NFR BLD: 0.4 %
BUN SERPL-MCNC: 14 MG/DL (ref 7–20)
CALCIUM SERPL-MCNC: 9.4 MG/DL (ref 8.3–10.6)
CHLORIDE SERPL-SCNC: 103 MMOL/L (ref 99–110)
CO2 SERPL-SCNC: 27 MMOL/L (ref 21–32)
CREAT SERPL-MCNC: 1.1 MG/DL (ref 0.6–1.2)
DEPRECATED RDW RBC AUTO: 16.2 % (ref 12.4–15.4)
ECHO AO ROOT DIAM: 2.2 CM
ECHO AO ROOT INDEX: 1.49 CM/M2
ECHO AV AREA PEAK VELOCITY: 1.6 CM2
ECHO AV AREA VTI: 1.5 CM2
ECHO AV AREA/BSA PEAK VELOCITY: 1.1 CM2/M2
ECHO AV AREA/BSA VTI: 1 CM2/M2
ECHO AV MEAN GRADIENT: 10 MMHG
ECHO AV MEAN VELOCITY: 1.5 M/S
ECHO AV PEAK GRADIENT: 19 MMHG
ECHO AV PEAK VELOCITY: 2.2 M/S
ECHO AV VELOCITY RATIO: 0.59
ECHO AV VTI: 37.5 CM
ECHO BSA: 1.46 M2
ECHO EST RA PRESSURE: 3 MMHG
ECHO LA AREA 2C: 13.1 CM2
ECHO LA AREA 4C: 13.2 CM2
ECHO LA MAJOR AXIS: 4.5 CM
ECHO LA MINOR AXIS: 5.2 CM
ECHO LA VOL BP: 32 ML (ref 22–52)
ECHO LA VOL MOD A2C: 27 ML (ref 22–52)
ECHO LA VOL MOD A4C: 33 ML (ref 22–52)
ECHO LA VOL/BSA BIPLANE: 22 ML/M2 (ref 16–34)
ECHO LA VOLUME INDEX MOD A2C: 18 ML/M2 (ref 16–34)
ECHO LA VOLUME INDEX MOD A4C: 22 ML/M2 (ref 16–34)
ECHO LV FRACTIONAL SHORTENING: 33 % (ref 28–44)
ECHO LV INTERNAL DIMENSION DIASTOLE INDEX: 2.64 CM/M2
ECHO LV INTERNAL DIMENSION DIASTOLIC: 3.9 CM (ref 3.9–5.3)
ECHO LV INTERNAL DIMENSION SYSTOLIC INDEX: 1.76 CM/M2
ECHO LV INTERNAL DIMENSION SYSTOLIC: 2.6 CM
ECHO LV IVSD: 1.2 CM (ref 0.6–0.9)
ECHO LV MASS 2D: 122.1 G (ref 67–162)
ECHO LV MASS INDEX 2D: 82.5 G/M2 (ref 43–95)
ECHO LV POSTERIOR WALL DIASTOLIC: 0.8 CM (ref 0.6–0.9)
ECHO LV RELATIVE WALL THICKNESS RATIO: 0.41
ECHO LVOT AREA: 2.8 CM2
ECHO LVOT AV VTI INDEX: 0.51
ECHO LVOT DIAM: 1.9 CM
ECHO LVOT MEAN GRADIENT: 3 MMHG
ECHO LVOT PEAK GRADIENT: 6 MMHG
ECHO LVOT PEAK VELOCITY: 1.3 M/S
ECHO LVOT STROKE VOLUME INDEX: 36.8 ML/M2
ECHO LVOT SV: 54.4 ML
ECHO MV A VELOCITY: 0.97 M/S
ECHO MV E DECELERATION TIME (DT): 129 MS
ECHO MV E VELOCITY: 0.62 M/S
ECHO MV E/A RATIO: 0.64
ECHO RIGHT VENTRICULAR SYSTOLIC PRESSURE (RVSP): 38 MMHG
ECHO RV TAPSE: 1.5 CM (ref 1.7–?)
ECHO TV REGURGITANT MAX VELOCITY: 2.96 M/S
ECHO TV REGURGITANT PEAK GRADIENT: 35 MMHG
EOSINOPHIL # BLD: 0.2 K/UL (ref 0–0.6)
EOSINOPHIL NFR BLD: 2.9 %
GFR SERPLBLD CREATININE-BSD FMLA CKD-EPI: 50 ML/MIN/{1.73_M2}
GLUCOSE SERPL-MCNC: 81 MG/DL (ref 70–99)
HCT VFR BLD AUTO: 31.3 % (ref 36–48)
HGB BLD-MCNC: 10 G/DL (ref 12–16)
LYMPHOCYTES # BLD: 1.1 K/UL (ref 1–5.1)
LYMPHOCYTES NFR BLD: 19.6 %
MCH RBC QN AUTO: 29.8 PG (ref 26–34)
MCHC RBC AUTO-ENTMCNC: 32.1 G/DL (ref 31–36)
MCV RBC AUTO: 92.8 FL (ref 80–100)
MONOCYTES # BLD: 0.3 K/UL (ref 0–1.3)
MONOCYTES NFR BLD: 5.7 %
NEUTROPHILS # BLD: 4.2 K/UL (ref 1.7–7.7)
NEUTROPHILS NFR BLD: 71.4 %
PLATELET # BLD AUTO: 186 K/UL (ref 135–450)
PMV BLD AUTO: 8.1 FL (ref 5–10.5)
POTASSIUM SERPL-SCNC: 4.3 MMOL/L (ref 3.5–5.1)
RBC # BLD AUTO: 3.37 M/UL (ref 4–5.2)
SODIUM SERPL-SCNC: 141 MMOL/L (ref 136–145)
WBC # BLD AUTO: 5.8 K/UL (ref 4–11)

## 2024-06-10 PROCEDURE — 93306 TTE W/DOPPLER COMPLETE: CPT

## 2024-06-10 PROCEDURE — 97530 THERAPEUTIC ACTIVITIES: CPT

## 2024-06-10 PROCEDURE — 6370000000 HC RX 637 (ALT 250 FOR IP)

## 2024-06-10 PROCEDURE — 6360000002 HC RX W HCPCS: Performed by: INTERNAL MEDICINE

## 2024-06-10 PROCEDURE — 36415 COLL VENOUS BLD VENIPUNCTURE: CPT

## 2024-06-10 PROCEDURE — 85025 COMPLETE CBC W/AUTO DIFF WBC: CPT

## 2024-06-10 PROCEDURE — 97162 PT EVAL MOD COMPLEX 30 MIN: CPT

## 2024-06-10 PROCEDURE — 94640 AIRWAY INHALATION TREATMENT: CPT

## 2024-06-10 PROCEDURE — 6370000000 HC RX 637 (ALT 250 FOR IP): Performed by: INTERNAL MEDICINE

## 2024-06-10 PROCEDURE — 6370000000 HC RX 637 (ALT 250 FOR IP): Performed by: NURSE PRACTITIONER

## 2024-06-10 PROCEDURE — 94761 N-INVAS EAR/PLS OXIMETRY MLT: CPT

## 2024-06-10 PROCEDURE — 80048 BASIC METABOLIC PNL TOTAL CA: CPT

## 2024-06-10 PROCEDURE — 2700000000 HC OXYGEN THERAPY PER DAY

## 2024-06-10 PROCEDURE — 2580000003 HC RX 258

## 2024-06-10 RX ORDER — ACETAMINOPHEN 325 MG/1
650 TABLET ORAL EVERY 6 HOURS PRN
Qty: 30 TABLET | Refills: 0 | Status: SHIPPED | OUTPATIENT
Start: 2024-06-10

## 2024-06-10 RX ADMIN — SODIUM CHLORIDE, PRESERVATIVE FREE 10 ML: 5 INJECTION INTRAVENOUS at 08:22

## 2024-06-10 RX ADMIN — ALBUTEROL SULFATE 2.5 MG: 2.5 SOLUTION RESPIRATORY (INHALATION) at 11:47

## 2024-06-10 RX ADMIN — ALBUTEROL SULFATE 2.5 MG: 2.5 SOLUTION RESPIRATORY (INHALATION) at 15:58

## 2024-06-10 RX ADMIN — ACETAMINOPHEN 650 MG: 325 TABLET ORAL at 10:24

## 2024-06-10 RX ADMIN — METHOCARBAMOL 1500 MG: 750 TABLET ORAL at 08:19

## 2024-06-10 RX ADMIN — BUSPIRONE HYDROCHLORIDE 5 MG: 5 TABLET ORAL at 03:10

## 2024-06-10 RX ADMIN — GUAIFENESIN 600 MG: 600 TABLET ORAL at 08:18

## 2024-06-10 RX ADMIN — TIOTROPIUM BROMIDE INHALATION SPRAY 2 PUFF: 3.12 SPRAY, METERED RESPIRATORY (INHALATION) at 11:47

## 2024-06-10 RX ADMIN — Medication 2 PUFF: at 11:47

## 2024-06-10 RX ADMIN — APIXABAN 2.5 MG: 2.5 TABLET, FILM COATED ORAL at 08:19

## 2024-06-10 RX ADMIN — HYDROXYZINE HYDROCHLORIDE 25 MG: 10 TABLET ORAL at 08:18

## 2024-06-10 RX ADMIN — ASPIRIN 81 MG 81 MG: 81 TABLET ORAL at 08:18

## 2024-06-10 RX ADMIN — METHOCARBAMOL 1500 MG: 750 TABLET ORAL at 12:41

## 2024-06-10 RX ADMIN — PANTOPRAZOLE SODIUM 40 MG: 40 TABLET, DELAYED RELEASE ORAL at 08:18

## 2024-06-10 ASSESSMENT — PAIN SCALES - WONG BAKER
WONGBAKER_NUMERICALRESPONSE: NO HURT

## 2024-06-10 ASSESSMENT — PAIN SCALES - GENERAL: PAINLEVEL_OUTOF10: 5

## 2024-06-10 ASSESSMENT — PAIN DESCRIPTION - LOCATION: LOCATION: BACK

## 2024-06-10 NOTE — PLAN OF CARE
Problem: Discharge Planning  Goal: Discharge to home or other facility with appropriate resources  6/10/2024 1006 by Meghann Drummond RN  Outcome: Progressing  Flowsheets (Taken 6/10/2024 0825)  Discharge to home or other facility with appropriate resources: Identify barriers to discharge with patient and caregiver  6/9/2024 2201 by Jerome Mcghee RN  Outcome: Progressing     Problem: Pain  Goal: Verbalizes/displays adequate comfort level or baseline comfort level  6/10/2024 1006 by Meghann Drummond RN  Outcome: Progressing  Flowsheets (Taken 6/10/2024 0748)  Verbalizes/displays adequate comfort level or baseline comfort level: Encourage patient to monitor pain and request assistance  6/9/2024 2201 by Jerome Mcghee RN  Outcome: Progressing     Problem: Safety - Adult  Goal: Free from fall injury  6/10/2024 1006 by Meghann Drummond RN  Outcome: Progressing  6/9/2024 2201 by Jerome Mcghee RN  Outcome: Progressing     Problem: Chronic Conditions and Co-morbidities  Goal: Patient's chronic conditions and co-morbidity symptoms are monitored and maintained or improved  6/10/2024 1006 by Meghann Drummond RN  Outcome: Progressing  Flowsheets (Taken 6/10/2024 0825)  Care Plan - Patient's Chronic Conditions and Co-Morbidity Symptoms are Monitored and Maintained or Improved: Monitor and assess patient's chronic conditions and comorbid symptoms for stability, deterioration, or improvement  6/9/2024 2201 by Jerome Mcgehe RN  Outcome: Progressing     Problem: Neurosensory - Adult  Goal: Achieves stable or improved neurological status  6/10/2024 1006 by Meghann Drummond RN  Outcome: Progressing  Flowsheets (Taken 6/10/2024 0825)  Achieves stable or improved neurological status: Assess for and report changes in neurological status  6/9/2024 2201 by Jerome Mcghee RN  Outcome: Progressing  Goal: Achieves maximal functionality and self care  6/10/2024 1006 by Meghann Drummond RN  Outcome: Progressing  Flowsheets (Taken 6/10/2024 0825)  Achieves 
  Problem: Discharge Planning  Goal: Discharge to home or other facility with appropriate resources  6/7/2024 1306 by Meghann Drummond RN  Outcome: Progressing  Flowsheets (Taken 6/7/2024 0951)  Discharge to home or other facility with appropriate resources: Identify barriers to discharge with patient and caregiver  6/7/2024 0140 by Neha Cordero RN  Outcome: Progressing  Flowsheets (Taken 6/7/2024 0100)  Discharge to home or other facility with appropriate resources: Identify barriers to discharge with patient and caregiver     Problem: Pain  Goal: Verbalizes/displays adequate comfort level or baseline comfort level  6/7/2024 1306 by Meghann Drummond RN  Outcome: Progressing  Flowsheets  Taken 6/7/2024 1218  Verbalizes/displays adequate comfort level or baseline comfort level: Encourage patient to monitor pain and request assistance  Taken 6/7/2024 0729  Verbalizes/displays adequate comfort level or baseline comfort level: Encourage patient to monitor pain and request assistance  6/7/2024 0140 by Neha Cordero RN  Outcome: Progressing     Problem: Safety - Adult  Goal: Free from fall injury  6/7/2024 1306 by Meghann Drummond RN  Outcome: Progressing  6/7/2024 0140 by Neha Cordero RN  Outcome: Progressing     Problem: Chronic Conditions and Co-morbidities  Goal: Patient's chronic conditions and co-morbidity symptoms are monitored and maintained or improved  Outcome: Progressing  Flowsheets (Taken 6/7/2024 0951)  Care Plan - Patient's Chronic Conditions and Co-Morbidity Symptoms are Monitored and Maintained or Improved: Monitor and assess patient's chronic conditions and comorbid symptoms for stability, deterioration, or improvement     Problem: Neurosensory - Adult  Goal: Achieves stable or improved neurological status  Outcome: Progressing  Goal: Absence of seizures  Outcome: Progressing  Goal: Remains free of injury related to seizures activity  Outcome: Progressing  Goal: Achieves maximal functionality and self care  Outcome: 
  Problem: Discharge Planning  Goal: Discharge to home or other facility with appropriate resources  Outcome: Progressing     Problem: Pain  Goal: Verbalizes/displays adequate comfort level or baseline comfort level  Outcome: Progressing     Problem: Safety - Adult  Goal: Free from fall injury  Outcome: Progressing     Problem: Chronic Conditions and Co-morbidities  Goal: Patient's chronic conditions and co-morbidity symptoms are monitored and maintained or improved  Outcome: Progressing     Problem: Neurosensory - Adult  Goal: Achieves stable or improved neurological status  Outcome: Progressing    Goal: Achieves maximal functionality and self care  Outcome: Progressing     Problem: Skin/Tissue Integrity  Goal: Absence of new skin breakdown  Outcome: Progressing     Problem: Anxiety  Goal: Will report anxiety at manageable levels  Outcome: Progressing       
  Problem: Discharge Planning  Goal: Discharge to home or other facility with appropriate resources  Outcome: Progressing     Problem: Pain  Goal: Verbalizes/displays adequate comfort level or baseline comfort level  Outcome: Progressing     Problem: Safety - Adult  Goal: Free from fall injury  Outcome: Progressing     Problem: Chronic Conditions and Co-morbidities  Goal: Patient's chronic conditions and co-morbidity symptoms are monitored and maintained or improved  Outcome: Progressing     Problem: Neurosensory - Adult  Goal: Achieves stable or improved neurological status  Outcome: Progressing    Goal: Achieves maximal functionality and self care  Outcome: Progressing    Problem: Skin/Tissue Integrity  Goal: Absence of new skin breakdown  Outcome: Progressing     Problem: Anxiety  Goal: Will report anxiety at manageable levels  Outcome: Progressing     
  Problem: Discharge Planning  Goal: Discharge to home or other facility with appropriate resources  Outcome: Progressing  Flowsheets (Taken 6/7/2024 0100)  Discharge to home or other facility with appropriate resources: Identify barriers to discharge with patient and caregiver     Problem: Pain  Goal: Verbalizes/displays adequate comfort level or baseline comfort level  Outcome: Progressing     Problem: Safety - Adult  Goal: Free from fall injury  Outcome: Progressing     
  Problem: Discharge Planning  Goal: Discharge to home or other facility with appropriate resources  Outcome: Progressing  Flowsheets (Taken 6/9/2024 1111)  Discharge to home or other facility with appropriate resources: Identify barriers to discharge with patient and caregiver     Problem: Pain  Goal: Verbalizes/displays adequate comfort level or baseline comfort level  Outcome: Progressing     Problem: Safety - Adult  Goal: Free from fall injury  Outcome: Progressing     Problem: Chronic Conditions and Co-morbidities  Goal: Patient's chronic conditions and co-morbidity symptoms are monitored and maintained or improved  Outcome: Progressing  Flowsheets (Taken 6/9/2024 1111)  Care Plan - Patient's Chronic Conditions and Co-Morbidity Symptoms are Monitored and Maintained or Improved: Monitor and assess patient's chronic conditions and comorbid symptoms for stability, deterioration, or improvement     Problem: Neurosensory - Adult  Goal: Achieves stable or improved neurological status  Outcome: Progressing  Flowsheets (Taken 6/9/2024 1111)  Achieves stable or improved neurological status: Assess for and report changes in neurological status  Goal: Achieves maximal functionality and self care  Outcome: Progressing  Flowsheets (Taken 6/9/2024 1111)  Achieves maximal functionality and self care: Monitor swallowing and airway patency with patient fatigue and changes in neurological status     Problem: Skin/Tissue Integrity  Goal: Absence of new skin breakdown  Description: 1.  Monitor for areas of redness and/or skin breakdown  2.  Assess vascular access sites hourly  3.  Every 4-6 hours minimum:  Change oxygen saturation probe site  4.  Every 4-6 hours:  If on nasal continuous positive airway pressure, respiratory therapy assess nares and determine need for appliance change or resting period.  Outcome: Progressing     Problem: Anxiety  Goal: Will report anxiety at manageable levels  Description: INTERVENTIONS:  1. 
Increase function to baseline.    
Medication: nimodipine 30 MG + escitalopram 5 MG + Fioricet + Tizanidine 2mg     Date of last refill: 11.20.18 (#15/0) +  11.20.18 (#14/0)  [place]    Last office visit: N/A  New patient-was seen in the Hospital  Due
UE There ex, Bed mobility, Transfers, ADL training, Adaptative equipment training, Therapeutic activity, Neuromuscular re-education, Patient education   
airway patency with patient fatigue and changes in neurological status     Problem: Skin/Tissue Integrity  Goal: Absence of new skin breakdown  Description: 1.  Monitor for areas of redness and/or skin breakdown  2.  Assess vascular access sites hourly  3.  Every 4-6 hours minimum:  Change oxygen saturation probe site  4.  Every 4-6 hours:  If on nasal continuous positive airway pressure, respiratory therapy assess nares and determine need for appliance change or resting period.  6/8/2024 1116 by Meghann Drummond, RN  Outcome: Progressing  6/7/2024 2240 by Neha Cordero, RN  Outcome: Progressing     Problem: Anxiety  Goal: Will report anxiety at manageable levels  Description: INTERVENTIONS:  1. Administer medication as ordered  2. Teach and rehearse alternative coping skills  3. Provide emotional support with 1:1 interaction with staff  Outcome: Progressing     
period.  6/7/2024 2240 by Neha Cordero RN  Outcome: Progressing  6/7/2024 1306 by Meghann Drummond RN  Outcome: Progressing

## 2024-06-10 NOTE — DISCHARGE INSTR - COC
Status:  oriented and alert    IV Access:  - None    Nursing Mobility/ADLs:  Walking   Assisted  Transfer  Assisted  Bathing  Assisted  Dressing  Assisted  Toileting  Assisted  Feeding  Independent  Med Admin  Assisted  Med Delivery   none    Wound Care Documentation and Therapy:  Wound 08/06/23 Buttocks Right (Active)   Number of days: 309        Elimination:  Continence:   Bowel: Yes  Bladder: No  Urinary Catheter: None   Colostomy/Ileostomy/Ileal Conduit: No       Date of Last BM: 6/6/24    Intake/Output Summary (Last 24 hours) at 6/10/2024 1633  Last data filed at 6/10/2024 1359  Gross per 24 hour   Intake 480 ml   Output 0 ml   Net 480 ml     I/O last 3 completed shifts:  In: 720 [P.O.:720]  Out: 450 [Urine:450]    Safety Concerns:     At Risk for Falls    Impairments/Disabilities:      None    Nutrition Therapy:  Current Nutrition Therapy:   - Oral Diet:  General    Routes of Feeding: Oral  Liquids: No Restrictions  Daily Fluid Restriction: no  Last Modified Barium Swallow with Video (Video Swallowing Test): not done    Treatments at the Time of Hospital Discharge:   Respiratory Treatments: yes  Oxygen Therapy:  is on oxygen at 2 L/min per nasal cannula.  Ventilator:    - No ventilator support    Rehab Therapies: Physical Therapy and Occupational Therapy  Weight Bearing Status/Restrictions: No weight bearing restrictions  Other Medical Equipment (for information only, NOT a DME order):  walker  Other Treatments: none    Patient's personal belongings sent home with patient    RN SIGNATURE:  Electronically signed by Meghann Drummond RN on 6/10/24 at 4:37 PM EDT    CASE MANAGEMENT/SOCIAL WORK SECTION    Inpatient Status Date: ***    Readmission Risk Assessment Score:  Readmission Risk              Risk of Unplanned Readmission:  39           Discharging to Facility/ Agency   Name:   Address:  Phone:  Fax:    Dialysis Facility (if applicable)   Name:  Address:  Dialysis Schedule:  Phone:  Fax:    /Social

## 2024-06-10 NOTE — PROGRESS NOTES
V2.0    [facility] [place] Progress Note      Name:  Terri Smith /Age/Sex: 1940  (83 y.o. female)   MRN & CSN:  [id number] & [id number] Encounter Date/Time: 2024 1:38 PM EDT   Location:  0367/0367-01 PCP: Jackson Ontiveros MD     Attending:Marc Damon MD       Hospital Day: 2    Assessment and Recommendations   Terri Smith is a 83 y.o. female with pmh of CAD s/p CABG X2, HLD, asthma, former smoker, COPD on home oxygen, CKD stage III A, HTN, NEVA, PAF, GERD  who presents with Chest pain due to CAD (HCC)      Plan:   Chest pain rule out ACS/CAD/s/p CABG X2  -Place on observation on telemetry unit.   -Has no evidence of acute ischemic changes on initial EKG. Will repeat in am and PRN for chest pain.   -Troponin abnormal initially. Will trend. Delta negative  -Will continue aspirin, hold BP meds as BP is on the lower side but continue statins.   -Will use morphine and nitro for pain relief.  -Lipid panel ordered  -HGBa1c ordered  -Stress test in am  -ECHO ordered  COPD/asthma-not in exacerbation  -Supplemental oxygen to keep SaO2 above 92%.   -Respiratory eval and treat, Inhaled bronchodilators/ICS.  -Mucolytic.   -Incentive spirometry q 1 hour while awake.   -Use home CPAP  HTN-hold lisinopril and metoprolol, patient blood pressure is low  HLD-continue statin  GERD-continue PPI  NEVA-continue BuSpar          Diet Diet NPO   DVT Prophylaxis [] Lovenox, []  Heparin, [] SCDs, [] Ambulation,  [x] Eliquis, [] Xarelto  [] Coumadin   Code Status Full Code   Disposition From: Home  Expected Disposition: Home   Surrogate Decision Maker/ POA         Subjective:     Chief Complaint: Chest pain    Terri Smith is a 83 y.o. female with a pmh of CAD s/p CABG X2, HLD, asthma, former smoker, COPD on home oxygen, CKD stage III A, HTN, NEVA, PAF, GERD who presents with Chest pain due to CAD (HCC) and NADJA.  Patient presented to the ED complaining of exertional chest pain that started this afternoon.  She was 
1945: Patient with increased anxiety and reported SOB, SpO2 96 on 2L NC (baseline), breathing tx completed for evening. Pt with visible shaking. PRN Buspar already given and not yet available, see MAR. Amanda Warren, APRN, notified. PRN Atarax ordered 3x daily. Atarax given at 1947.    2125: Pt still shaking, but no longer as anxious and no complaints of SOB. Warm blanket offered and denied at this time. No other needs.  
4 Eyes Skin Assessment and Patient belongings     The patient is being assess for  Admission    I agree that 2 Nurses have performed a thorough Head to Toe Skin Assessment on the patient. ALL assessment sites listed below have been assessed.       Areas assessed by both nurses:   [x]   Head, Face, and Ears   [x]   Shoulders, Back, and Chest  [x]   Arms, Elbows, and Hands   [x]   Coccyx, Sacrum, and IschIum  [x]   Legs, Feet, and Heels        Does the Patient have Skin Breakdown?  NoN -blanchable redness of sacrum and right heel; scattered echhymosis        Trey Prevention initiated:  No   Wound Care Orders initiated:  No      [facility] nurse consulted for Pressure Injury (Stage 3,4, Unstageable, DTI, NWPT, and Complex wounds), New and Established Ostomies:  No      I agree that 2 Nurses have reviewed patient belongings with the patient/family and documented in the flowsheet upon admission or transfer to the unit.     Belongings  Dental Appliances: Uppers, At home  Vision - Corrective Lenses: None  Hearing Aid: None  Clothing: Sweater, Footwear, Other (Comment), At bedside (pink nightgown)  Jewelry: None  Electronic Devices: Cell Phone, At bedside  Weapons (Notify Protective Services/Security): None  Home Medications: None  Valuables Given To: Patient  Provide Name(s) of Who Valuable(s) Were Given To: Terri Smith       Nurse 1 eSignature: Electronically signed by KISHORE CHOPRA RN on 6/7/24 at 1:17 AM EDT    **SHARE this note so that the co-signing nurse is able to place an eSignature**    Nurse 2 eSignature: Electronically signed by MANUELA العلي RN on 6/7/24 at 1:49 AM EDT   
Called for report, placed on hold for 3 mins. Will call back later  
Patient admitted to room 367 from ED.  Patient oriented to room, call light, bed rails, phone, lights and bathroom.  Patient instructed about the schedule of the day including: vital sign frequency, lab draws, possible tests, frequency of MD and staff rounds, including RN/MD rounding together at bedside, daily weights, and I &O's.  Patient instructed about prescribed diet, how to use 8MENU, and television. bed alarm in place, patient aware of placement and reason.  Telemetry box  in place, patient aware of placement and reason.  Bed locked, in lowest position, side rails up 2/4, call light within reach.  Will continue to monitor.     
Patient discharged to home. IV removed with no complications, telemetry removed CMU notified. Discharge education complete with verbal understanding. All belongings sent home with patient. Patient transported via wheelchair.   
Physical Therapy  Facility/Department: [place] [id number] - [facility]  Physical Therapy Initial Assessment    Name: Terri Smith  : 1940  MRN: [id number]  Date of Service: 6/10/2024    Discharge Recommendations:  24 hour supervision or assist, Home with Home health PT   PT Equipment Recommendations  Equipment Needed: No      Patient Diagnosis(es): The encounter diagnosis was Chest pain, unspecified type.  Past Medical History:  has a past medical history of NADJA (acute kidney injury) (HCC), Asthma, Atrial fibrillation with RVR (HCC), CAD (coronary artery disease), CHF (congestive heart failure) (HCC), Chronic anxiety, COPD (chronic obstructive pulmonary disease) (HCC), COPD (chronic obstructive pulmonary disease) (HCC), GERD (gastroesophageal reflux disease), Heart attack (HCC), History of blood transfusion, Hyperlipidemia, Hypertension, MRSA (methicillin resistant staph aureus) culture positive, OA (osteoarthritis), and Thyroid disease.  Past Surgical History:  has a past surgical history that includes  section; Mastectomy, partial (Left); bronchoscopy (2019); Cardiac catheterization (2019); Coronary artery bypass graft (N/A, 2019); Colonoscopy (N/A, 2020); Sigmoidoscopy (2020); sigmoidoscopy (N/A, 2020); IR MIDLINE CATH (2021); Colonoscopy (N/A, 10/22/2021); Upper gastrointestinal endoscopy (N/A, 2023); Upper gastrointestinal endoscopy (N/A, 2023); Upper gastrointestinal endoscopy (2023); Colonoscopy (N/A, 2023); sigmoidoscopy (N/A, 2023); and Upper gastrointestinal endoscopy (N/A, 2024).    Assessment   Body Structures, Functions, Activity Limitations Requiring Skilled Therapeutic Intervention: Decreased functional mobility ;Decreased strength;Decreased endurance;Decreased balance  Assessment: Pt referred for PT evaluation during current hospital stay with dx of chest pain and COPD.  Pt currently functioning below baseline, requiring 
presented to the ED complaining of exertional chest pain that started this afternoon.  She was walking through her house when she began feeling left-sided chest pain.  It did radiate to her left arm.  She also experienced some associated shortness of breath however she is known history of COPD and normally wears 2 and half liters at home.  Patient has had not increased her oxygen at this time.  Patient took aspirin with EMS.  Patient has significant cardiac history.      Today, reports episodic SOB and chest pain. Reports anxiety about health. Daughter in room also notes pt has known hx of panic attacks. Pt denies any headache, body ache, fever, chills, GI,  symptoms at present.       Review of Systems:      Pertinent positives and negatives discussed in HPI    Objective:     Intake/Output Summary (Last 24 hours) at 6/9/2024 1436  Last data filed at 6/9/2024 0650  Gross per 24 hour   Intake 480 ml   Output 450 ml   Net 30 ml      Vitals:   Vitals:    06/09/24 0900 06/09/24 1030 06/09/24 1156 06/09/24 1230   BP: 127/65   (!) 109/50   Pulse: 98  82 87   Resp: 24  16 17   Temp: 98.2 °F (36.8 °C)   97.8 °F (36.6 °C)   TempSrc: Oral   Oral   SpO2: 99% 97% 100% 98%   Weight:       Height:             Physical Exam:      General: NAD  Eyes: EOMI  ENT: neck supple  Cardiovascular: Regular rate. No murmurs, gallops  Respiratory: Clear to auscultation. Good air movement. No wheezing noted.  Gastrointestinal: Soft, non tender  Genitourinary: no suprapubic tenderness  Musculoskeletal: No edema  Skin: warm, dry  Neuro: Alert.  Psych: Mood appropriate.     Medications:   Medications:    methocarbamol  1,500 mg Oral 4x Daily    aspirin  81 mg Oral Daily    atorvastatin  40 mg Oral Nightly    apixaban  2.5 mg Oral BID    guaiFENesin  600 mg Oral Daily    pantoprazole  40 mg Oral Daily    sodium chloride flush  5-40 mL IntraVENous 2 times per day    mometasone-formoterol  2 puff Inhalation BID RT    tiotropium  2 puff Inhalation 
Moderate assistance  Grooming: Minimal assistance  Feeding: Independent  Toileting: Independent  Homemaking Responsibilities: No  Ambulation Assistance: Independent (without AD)  Transfer Assistance: Independent  Active : No  Occupation: Retired  Leisure & Hobbies: hand quilting  Additional Comments: daughter reports pt can get up & walk around bed-/living room with supervision, prior to March was able to get outside to walk with someone following with w/c;       Objective   Temp: 97.6 °F (36.4 °C)  Pulse: (!) 102  Heart Rate Source: Monitor  Respirations: 22  SpO2: 99 %  O2 Device: Nasal cannula  BP: 126/86  MAP (Calculated): 99  BP Location: Left upper arm  BP Method: Automatic  Patient Position: Semi fowlers             Safety Devices  Type of Devices: All danna prominences offloaded;Call light within reach;Chair alarm in place;Nurse notified;Left in chair;Patient at risk for falls  Restraints  Restraints Initially in Place: No           ADL  LE Dressing: Maximum assistance (for donning brief in bed; donning non-skid slippers on in bed)  Toileting: Dependent/Total (failed purewick; saturated in urine in bed;)        Bed mobility  Supine to Sit: Contact guard assistance  Sit to Supine: Unable to assess (Left up in chair upon exiting pt & RN agreeable)  Transfers  Stand Pivot Transfers: Minimal assistance (without AD)  Sit to stand: Minimal assistance  Stand to sit: Minimal assistance  Transfer Comments: decreased vision needs assist    Vision  Vision: Impaired (macular degeneration)  Vision Exceptions: Legally blind    Hearing  Hearing: Within functional limits    Cognition  Overall Cognitive Status: Exceptions (increased anxiety with mobility)  Arousal/Alertness: Appears intact  Following Commands: Follows one step commands with increased time  Attention Span: Appears intact  Memory: Appears intact  Safety Judgement: Decreased awareness of need for assistance  Insights: Fully aware of deficits  Initiation: 
BACTERIA 2+ 08/30/2023 01:50 PM    CLARITYU Clear 12/15/2023 06:26 PM    SPECGRAV <1.005 05/07/2013 01:35 PM    LEUKOCYTESUR Negative 12/15/2023 06:26 PM    UROBILINOGEN 0.2 12/15/2023 06:26 PM    BILIRUBINUR Negative 12/15/2023 06:26 PM    BILIRUBINUR neg 05/07/2013 01:35 PM    BLOODU Negative 12/15/2023 06:26 PM    GLUCOSEU Negative 12/15/2023 06:26 PM    KETUA Negative 12/15/2023 06:26 PM    AMORPHOUS 2+ 07/05/2023 06:11 PM     Urine Cultures:   Lab Results   Component Value Date/Time    LABURIN  08/30/2023 02:09 PM     <10,000 CFU/ml mixed skin/urogenital milad. No further workup     Blood Cultures:   Lab Results   Component Value Date/Time    BC No Growth after 4 days of incubation. 01/02/2024 10:11 PM     Lab Results   Component Value Date/Time    BLOODCULT2 No Growth after 4 days of incubation. 01/02/2024 10:11 PM     Organism:   Lab Results   Component Value Date/Time    ORG BHS Group B (Strep agalacticae) 07/05/2023 06:11 PM         Electronically signed by Ray Powell MD on 6/8/2024 at 12:15 PM

## 2024-06-10 NOTE — CARE COORDINATION
CASE MANAGEMENT DISCHARGE SUMMARY      Discharge to: Home with [facility]    IMM given: 6/10/2024    Transportation:    Family/car: private    Confirmed discharge plan with:     Patient: yes     Family:  yes, at bedside     RN, name: Meghann Wilson RN         
[facility]. They can accept. Ashly Wilson RN   
discharge to: House  Plan for transportation at discharge:      Financial    Payor: MEDICARE / Plan: MEDICARE PART A AND B / Product Type: *No Product type* /     Does insurance require precert for SNF: No    Potential assistance Purchasing Medications:    Meds-to-Beds request: Yes      [id number] [address] [facility] [phone] - F [phone]  [address] [facility] [address]  Phone: [phone] Fax: [phone]    [facility] [id number] [facility] [address] [facility] [phone] - F [phone]  [address] [facility] [address]  Phone: [phone] Fax: [phone]    [facility] [address] [place] [address] [phone] - F [phone]  [address] [place] [address]  Phone: [phone] Fax: [phone]      Notes:    Factors facilitating achievement of predicted outcomes: Family support, Cooperative, Pleasant, and Good insight into deficits    Barriers to discharge: Decreased endurance    Additional Case Management Notes: Pt lives at home with her son. She reports being IPTA. She has home O2 and Cpap with [employer]. Her baseline O2 is 2.5L. She denies other needs. Will continue to follow course for needs. Ashly Wilson RN     The Plan for Transition of Care is related to the following treatment goals of Chest pain, unspecified type [R07.9]  Chest pain due to CAD (HCC) [I25.119]    IF APPLICABLE: The Patient and/or patient representative Terri and her family were provided with a choice of provider and agrees with the discharge plan. Freedom of choice list with basic dialogue that supports the patient's individualized plan of care/goals and shares the quality data associated with the providers was provided to:     Patient Representative Name:       The Patient and/or Patient Representative Agree with the Discharge Plan?      Ashly Wilson RN  Case Management Department

## 2024-06-11 NOTE — DISCHARGE SUMMARY
Medication List        CHANGE how you take these medications      * acetaminophen 325 MG tablet  Commonly known as: TYLENOL  Take 2 tablets by mouth every 6 hours as needed for Pain  What changed: You were already taking a medication with the same name, and this prescription was added. Make sure you understand how and when to take each.     * acetaminophen 500 MG tablet  Commonly known as: TYLENOL  What changed: Another medication with the same name was added. Make sure you understand how and when to take each.           * This list has 2 medication(s) that are the same as other medications prescribed for you. Read the directions carefully, and ask your doctor or other care provider to review them with you.                CONTINUE taking these medications      albuterol (2.5 MG/3ML) 0.083% nebulizer solution  Commonly known as: PROVENTIL     amLODIPine 10 MG tablet  Commonly known as: NORVASC  Take 0.5 tablets by mouth daily     aspirin 81 MG chewable tablet  Take 1 tablet by mouth daily Resume ASA on Monday. Monitor for any recurrent GI bleed.     atorvastatin 40 MG tablet  Commonly known as: LIPITOR     busPIRone 5 MG tablet  Commonly known as: BUSPAR     docusate 100 MG Caps  Commonly known as: COLACE, DULCOLAX  Take 100 mg by mouth daily     Eliquis 2.5 MG Tabs tablet  Generic drug: apixaban     ferrous sulfate 325 (65 Fe) MG tablet  Commonly known as: IRON 325     fluticasone 50 MCG/ACT nasal spray  Commonly known as: FLONASE  1 spray by Each Nostril route daily as needed for Rhinitis     guaiFENesin 600 MG extended release tablet  Commonly known as: MUCINEX     lactobacillus capsule  Take 1 capsule by mouth daily (with breakfast)     magnesium oxide 400 (240 Mg) MG tablet  Commonly known as: MAG-OX  Take 1 tablet by mouth daily     metoprolol tartrate 25 MG tablet  Commonly known as: LOPRESSOR  Take 1 tablet by mouth in the morning and at bedtime     ondansetron 4 MG tablet  Commonly known as: ZOFRAN

## 2024-07-15 NOTE — PROGRESS NOTES
hours as needed for Wheezing   Yes Charmaine Gant MD   guaiFENesin (MUCINEX) 600 MG extended release tablet Take 1 tablet by mouth daily   Yes Charmaine Gant MD   Roflumilast (DALIRESP) 500 MCG tablet Take 1 tablet by mouth daily   Yes Chamraine Gant MD   OXYGEN Inhale 2 L into the lungs 2-3 6/1/21  Yes Charmaine Gant MD   acetaminophen (TYLENOL) 500 MG tablet Take 1 tablet by mouth every 6 hours as needed for Pain   Yes Charmaine Gant MD   ondansetron (ZOFRAN) 4 MG tablet Take 1 tablet by mouth every 8 hours as needed for Nausea or Vomiting  Patient not taking: Reported on 7/18/2024    Charmaine Gant MD      Allergies:  Latex and Codeine     Review of Systems:   Constitutional: there has been no unanticipated weight loss. There's been no change in energy level, sleep pattern, or activity level.     Eyes: No visual changes or diplopia. No scleral icterus.  ENT: No Headaches, hearing loss or vertigo. No mouth sores or sore throat.  Cardiovascular: Reviewed in HPI  Respiratory: No cough or wheezing, no sputum production. No hematemesis.    Gastrointestinal: No abdominal pain, appetite loss, blood in stools. No change in bowel or bladder habits.  Genitourinary: No dysuria, trouble voiding, or hematuria.  Musculoskeletal:  No gait disturbance, weakness or joint complaints.  Integumentary: No rash or pruritis.  Neurological: No headache, diplopia, change in muscle strength, numbness or tingling. No change in gait, balance, coordination, mood, affect, memory, mentation, behavior.  Psychiatric: No anxiety, no depression.  Endocrine: No malaise, fatigue or temperature intolerance. No excessive thirst, fluid intake, or urination. No tremor.  Hematologic/Lymphatic: No abnormal bruising or bleeding, blood clots or swollen lymph nodes.  Allergic/Immunologic: No nasal congestion or hives.    Physical Examination:    Vitals:    07/18/24 1314   BP: 118/64   Pulse: 78   SpO2: 98%

## 2024-07-18 ENCOUNTER — OFFICE VISIT (OUTPATIENT)
Dept: CARDIOLOGY CLINIC | Age: 84
End: 2024-07-18
Payer: MEDICARE

## 2024-07-18 VITALS
OXYGEN SATURATION: 98 % | SYSTOLIC BLOOD PRESSURE: 118 MMHG | BODY MASS INDEX: 18.1 KG/M2 | WEIGHT: 106 LBS | DIASTOLIC BLOOD PRESSURE: 64 MMHG | HEART RATE: 78 BPM | HEIGHT: 64 IN

## 2024-07-18 DIAGNOSIS — I25.110 CORONARY ARTERY DISEASE INVOLVING NATIVE CORONARY ARTERY OF NATIVE HEART WITH UNSTABLE ANGINA PECTORIS (HCC): ICD-10-CM

## 2024-07-18 DIAGNOSIS — E78.2 MIXED HYPERLIPIDEMIA: Primary | ICD-10-CM

## 2024-07-18 DIAGNOSIS — I48.0 PAF (PAROXYSMAL ATRIAL FIBRILLATION) (HCC): ICD-10-CM

## 2024-07-18 DIAGNOSIS — Z95.1 S/P CABG X 2: ICD-10-CM

## 2024-07-18 DIAGNOSIS — Z87.891 HISTORY OF TOBACCO ABUSE: ICD-10-CM

## 2024-07-18 DIAGNOSIS — I25.5 ISCHEMIC CARDIOMYOPATHY: ICD-10-CM

## 2024-07-18 PROCEDURE — 1036F TOBACCO NON-USER: CPT | Performed by: INTERNAL MEDICINE

## 2024-07-18 PROCEDURE — 3078F DIAST BP <80 MM HG: CPT | Performed by: INTERNAL MEDICINE

## 2024-07-18 PROCEDURE — G8400 PT W/DXA NO RESULTS DOC: HCPCS | Performed by: INTERNAL MEDICINE

## 2024-07-18 PROCEDURE — 3074F SYST BP LT 130 MM HG: CPT | Performed by: INTERNAL MEDICINE

## 2024-07-18 PROCEDURE — 99214 OFFICE O/P EST MOD 30 MIN: CPT | Performed by: INTERNAL MEDICINE

## 2024-07-18 PROCEDURE — G8427 DOCREV CUR MEDS BY ELIG CLIN: HCPCS | Performed by: INTERNAL MEDICINE

## 2024-07-18 PROCEDURE — G8419 CALC BMI OUT NRM PARAM NOF/U: HCPCS | Performed by: INTERNAL MEDICINE

## 2024-07-18 PROCEDURE — 1090F PRES/ABSN URINE INCON ASSESS: CPT | Performed by: INTERNAL MEDICINE

## 2024-07-18 PROCEDURE — 1123F ACP DISCUSS/DSCN MKR DOCD: CPT | Performed by: INTERNAL MEDICINE

## 2024-07-18 RX ORDER — PREDNISONE 20 MG/1
TABLET ORAL
COMMUNITY
Start: 2024-07-14

## 2024-07-18 RX ORDER — POTASSIUM CHLORIDE 750 MG/1
CAPSULE, EXTENDED RELEASE ORAL
COMMUNITY
Start: 2024-06-28

## 2024-07-18 RX ORDER — ESCITALOPRAM OXALATE 10 MG/1
TABLET ORAL
COMMUNITY
Start: 2024-05-26

## 2024-07-18 NOTE — PATIENT INSTRUCTIONS
Labs reviewed in epic and discussed with patient.  Medications reviewed in office. Medications refilled as warranted  Dr. Ontiveros wanted to increase metoprolol 25mg to three times a day. Continue taking it only twice a day.   Do not take aspirin

## 2024-07-21 ENCOUNTER — APPOINTMENT (OUTPATIENT)
Dept: CT IMAGING | Age: 84
End: 2024-07-21
Payer: MEDICARE

## 2024-07-21 ENCOUNTER — HOSPITAL ENCOUNTER (EMERGENCY)
Age: 84
Discharge: HOME | End: 2024-07-21
Attending: STUDENT IN AN ORGANIZED HEALTH CARE EDUCATION/TRAINING PROGRAM
Payer: MEDICARE

## 2024-07-21 ENCOUNTER — APPOINTMENT (OUTPATIENT)
Dept: GENERAL RADIOLOGY | Age: 84
End: 2024-07-21
Payer: MEDICARE

## 2024-07-21 VITALS
TEMPERATURE: 98 F | RESPIRATION RATE: 19 BRPM | HEIGHT: 64 IN | BODY MASS INDEX: 23.9 KG/M2 | SYSTOLIC BLOOD PRESSURE: 141 MMHG | DIASTOLIC BLOOD PRESSURE: 71 MMHG | OXYGEN SATURATION: 100 % | HEART RATE: 84 BPM | WEIGHT: 140 LBS

## 2024-07-21 DIAGNOSIS — K20.90 ESOPHAGITIS: ICD-10-CM

## 2024-07-21 DIAGNOSIS — R11.2 NAUSEA AND VOMITING, UNSPECIFIED VOMITING TYPE: Primary | ICD-10-CM

## 2024-07-21 LAB
ALBUMIN SERPL-MCNC: 3.8 G/DL (ref 3.4–5)
ALBUMIN/GLOB SERPL: 1.4 {RATIO} (ref 1.1–2.2)
ALP SERPL-CCNC: 65 U/L (ref 40–129)
ALT SERPL-CCNC: 9 U/L (ref 10–40)
ANION GAP SERPL CALCULATED.3IONS-SCNC: 5 MMOL/L (ref 3–16)
AST SERPL-CCNC: 13 U/L (ref 15–37)
BASOPHILS # BLD: 0 K/UL (ref 0–0.2)
BASOPHILS NFR BLD: 0.4 %
BILIRUB SERPL-MCNC: <0.2 MG/DL (ref 0–1)
BUN SERPL-MCNC: 39 MG/DL (ref 7–20)
CALCIUM SERPL-MCNC: 10.3 MG/DL (ref 8.3–10.6)
CHLORIDE SERPL-SCNC: 94 MMOL/L (ref 99–110)
CO2 SERPL-SCNC: 45 MMOL/L (ref 21–32)
CREAT SERPL-MCNC: 1.4 MG/DL (ref 0.6–1.2)
DEPRECATED RDW RBC AUTO: 14.3 % (ref 12.4–15.4)
EKG ATRIAL RATE: 89 BPM
EKG DIAGNOSIS: NORMAL
EKG P AXIS: 81 DEGREES
EKG P-R INTERVAL: 166 MS
EKG Q-T INTERVAL: 356 MS
EKG QRS DURATION: 86 MS
EKG QTC CALCULATION (BAZETT): 433 MS
EKG R AXIS: -68 DEGREES
EKG T AXIS: 84 DEGREES
EKG VENTRICULAR RATE: 89 BPM
EOSINOPHIL # BLD: 0.1 K/UL (ref 0–0.6)
EOSINOPHIL NFR BLD: 0.7 %
GFR SERPLBLD CREATININE-BSD FMLA CKD-EPI: 37 ML/MIN/{1.73_M2}
GLUCOSE SERPL-MCNC: 97 MG/DL (ref 70–99)
HCT VFR BLD AUTO: 36.1 % (ref 36–48)
HGB BLD-MCNC: 11.9 G/DL (ref 12–16)
INR PPP: 0.86 (ref 0.85–1.15)
LIPASE SERPL-CCNC: 122 U/L (ref 13–60)
LYMPHOCYTES # BLD: 2.6 K/UL (ref 1–5.1)
LYMPHOCYTES NFR BLD: 24.2 %
MCH RBC QN AUTO: 30.5 PG (ref 26–34)
MCHC RBC AUTO-ENTMCNC: 33 G/DL (ref 31–36)
MCV RBC AUTO: 92.4 FL (ref 80–100)
MONOCYTES # BLD: 0.7 K/UL (ref 0–1.3)
MONOCYTES NFR BLD: 6.5 %
NEUTROPHILS # BLD: 7.3 K/UL (ref 1.7–7.7)
NEUTROPHILS NFR BLD: 68.2 %
PLATELET # BLD AUTO: 261 K/UL (ref 135–450)
PMV BLD AUTO: 7.9 FL (ref 5–10.5)
POTASSIUM SERPL-SCNC: 4.1 MMOL/L (ref 3.5–5.1)
PROT SERPL-MCNC: 6.5 G/DL (ref 6.4–8.2)
PROTHROMBIN TIME: 11.9 SEC (ref 11.9–14.9)
RBC # BLD AUTO: 3.9 M/UL (ref 4–5.2)
SODIUM SERPL-SCNC: 144 MMOL/L (ref 136–145)
TROPONIN, HIGH SENSITIVITY: 45 NG/L (ref 0–14)
TROPONIN, HIGH SENSITIVITY: 46 NG/L (ref 0–14)
WBC # BLD AUTO: 10.6 K/UL (ref 4–11)

## 2024-07-21 PROCEDURE — 93005 ELECTROCARDIOGRAM TRACING: CPT | Performed by: STUDENT IN AN ORGANIZED HEALTH CARE EDUCATION/TRAINING PROGRAM

## 2024-07-21 PROCEDURE — 71045 X-RAY EXAM CHEST 1 VIEW: CPT

## 2024-07-21 PROCEDURE — 93010 ELECTROCARDIOGRAM REPORT: CPT | Performed by: INTERNAL MEDICINE

## 2024-07-21 PROCEDURE — 96375 TX/PRO/DX INJ NEW DRUG ADDON: CPT

## 2024-07-21 PROCEDURE — 85025 COMPLETE CBC W/AUTO DIFF WBC: CPT

## 2024-07-21 PROCEDURE — 6360000002 HC RX W HCPCS: Performed by: STUDENT IN AN ORGANIZED HEALTH CARE EDUCATION/TRAINING PROGRAM

## 2024-07-21 PROCEDURE — 80053 COMPREHEN METABOLIC PANEL: CPT

## 2024-07-21 PROCEDURE — 36415 COLL VENOUS BLD VENIPUNCTURE: CPT

## 2024-07-21 PROCEDURE — 74176 CT ABD & PELVIS W/O CONTRAST: CPT

## 2024-07-21 PROCEDURE — 85610 PROTHROMBIN TIME: CPT

## 2024-07-21 PROCEDURE — 83690 ASSAY OF LIPASE: CPT

## 2024-07-21 PROCEDURE — 99285 EMERGENCY DEPT VISIT HI MDM: CPT

## 2024-07-21 PROCEDURE — 84484 ASSAY OF TROPONIN QUANT: CPT

## 2024-07-21 PROCEDURE — 96374 THER/PROPH/DIAG INJ IV PUSH: CPT

## 2024-07-21 RX ORDER — SUCRALFATE 1 G/1
1 TABLET ORAL 4 TIMES DAILY
Qty: 120 TABLET | Refills: 3 | Status: SHIPPED | OUTPATIENT
Start: 2024-07-21

## 2024-07-21 RX ORDER — PANTOPRAZOLE SODIUM 40 MG/1
40 TABLET, DELAYED RELEASE ORAL 2 TIMES DAILY
Qty: 30 TABLET | Refills: 0 | Status: SHIPPED | OUTPATIENT
Start: 2024-07-21

## 2024-07-21 RX ORDER — ONDANSETRON 2 MG/ML
4 INJECTION INTRAMUSCULAR; INTRAVENOUS ONCE
Status: COMPLETED | OUTPATIENT
Start: 2024-07-21 | End: 2024-07-21

## 2024-07-21 RX ORDER — ONDANSETRON 4 MG/1
4 TABLET, ORALLY DISINTEGRATING ORAL 3 TIMES DAILY PRN
Qty: 21 TABLET | Refills: 0 | Status: SHIPPED | OUTPATIENT
Start: 2024-07-21

## 2024-07-21 RX ORDER — PANTOPRAZOLE SODIUM 40 MG/10ML
40 INJECTION, POWDER, LYOPHILIZED, FOR SOLUTION INTRAVENOUS ONCE
Status: COMPLETED | OUTPATIENT
Start: 2024-07-21 | End: 2024-07-21

## 2024-07-21 RX ADMIN — PANTOPRAZOLE SODIUM 40 MG: 40 INJECTION, POWDER, FOR SOLUTION INTRAVENOUS at 04:16

## 2024-07-21 RX ADMIN — ONDANSETRON 4 MG: 2 INJECTION INTRAMUSCULAR; INTRAVENOUS at 04:16

## 2024-07-21 NOTE — DISCHARGE INSTRUCTIONS
Return the nearest ED if develop severe worsening nausea vomiting on improvement Zofran, chest pain, shortness of breath, bloody vomit, or black or bloody stools.

## 2024-07-21 NOTE — ED PROVIDER NOTES
Sensation normal. Motor normal  PSYCH: Mood normal. Affect normal.  SKIN: Color normal. No rash. Warm, Dry    LABS  I have reviewed all labs for this visit.   Results for orders placed or performed during the hospital encounter of 07/21/24   CBC with Auto Differential   Result Value Ref Range    WBC 10.6 4.0 - 11.0 K/uL    RBC 3.90 (L) 4.00 - 5.20 M/uL    Hemoglobin 11.9 (L) 12.0 - 16.0 g/dL    Hematocrit 36.1 36.0 - 48.0 %    MCV 92.4 80.0 - 100.0 fL    MCH 30.5 26.0 - 34.0 pg    MCHC 33.0 31.0 - 36.0 g/dL    RDW 14.3 12.4 - 15.4 %    Platelets 261 135 - 450 K/uL    MPV 7.9 5.0 - 10.5 fL    Neutrophils % 68.2 %    Lymphocytes % 24.2 %    Monocytes % 6.5 %    Eosinophils % 0.7 %    Basophils % 0.4 %    Neutrophils Absolute 7.3 1.7 - 7.7 K/uL    Lymphocytes Absolute 2.6 1.0 - 5.1 K/uL    Monocytes Absolute 0.7 0.0 - 1.3 K/uL    Eosinophils Absolute 0.1 0.0 - 0.6 K/uL    Basophils Absolute 0.0 0.0 - 0.2 K/uL   CMP w/ Reflex to MG   Result Value Ref Range    Sodium 144 136 - 145 mmol/L    Potassium reflex Magnesium 4.1 3.5 - 5.1 mmol/L    Chloride 94 (L) 99 - 110 mmol/L    CO2 45 (H) 21 - 32 mmol/L    Anion Gap 5 3 - 16    Glucose 97 70 - 99 mg/dL    BUN 39 (H) 7 - 20 mg/dL    Creatinine 1.4 (H) 0.6 - 1.2 mg/dL    Est, Glom Filt Rate 37 (A) >60    Calcium 10.3 8.3 - 10.6 mg/dL    Total Protein 6.5 6.4 - 8.2 g/dL    Albumin 3.8 3.4 - 5.0 g/dL    Albumin/Globulin Ratio 1.4 1.1 - 2.2    Total Bilirubin <0.2 0.0 - 1.0 mg/dL    Alkaline Phosphatase 65 40 - 129 U/L    ALT 9 (L) 10 - 40 U/L    AST 13 (L) 15 - 37 U/L   Lipase   Result Value Ref Range    Lipase 122.0 (H) 13.0 - 60.0 U/L   Troponin   Result Value Ref Range    Troponin, High Sensitivity 46 (H) 0 - 14 ng/L   Troponin   Result Value Ref Range    Troponin, High Sensitivity 45 (H) 0 - 14 ng/L   Protime-INR   Result Value Ref Range    Protime 11.9 11.9 - 14.9 sec    INR 0.86 0.85 - 1.15   EKG 12 Lead   Result Value Ref Range    Ventricular Rate 89 BPM    Atrial Rate

## 2024-07-30 ENCOUNTER — TELEPHONE (OUTPATIENT)
Dept: CASE MANAGEMENT | Age: 84
End: 2024-07-30

## 2024-07-30 NOTE — TELEPHONE ENCOUNTER
Imaging report Chest XR 7/21/24 with f/u imaging recommendations sent to [facility] YE(R)  Patient Navigator  Incidentals/Lung Navigation

## 2024-09-16 ENCOUNTER — APPOINTMENT (OUTPATIENT)
Dept: GENERAL RADIOLOGY | Age: 84
DRG: 683 | End: 2024-09-16
Payer: MEDICARE

## 2024-09-16 ENCOUNTER — HOSPITAL ENCOUNTER (INPATIENT)
Age: 84
LOS: 6 days | Discharge: HOME HEALTH | DRG: 683 | End: 2024-09-23
Attending: EMERGENCY MEDICINE | Admitting: INTERNAL MEDICINE
Payer: MEDICARE

## 2024-09-16 DIAGNOSIS — R79.89 ELEVATED TROPONIN: ICD-10-CM

## 2024-09-16 DIAGNOSIS — N17.9 AKI (ACUTE KIDNEY INJURY) (HCC): Primary | ICD-10-CM

## 2024-09-16 LAB
ALBUMIN SERPL-MCNC: 3.5 G/DL (ref 3.4–5)
ALBUMIN/GLOB SERPL: 1.1 {RATIO} (ref 1.1–2.2)
ALP SERPL-CCNC: 97 U/L (ref 40–129)
ALT SERPL-CCNC: 7 U/L (ref 10–40)
ANION GAP SERPL CALCULATED.3IONS-SCNC: 12 MMOL/L (ref 3–16)
AST SERPL-CCNC: 16 U/L (ref 15–37)
BASE EXCESS BLDV CALC-SCNC: 7.3 MMOL/L (ref -3–3)
BASOPHILS # BLD: 0 K/UL (ref 0–0.2)
BASOPHILS NFR BLD: 0.5 %
BILIRUB SERPL-MCNC: 0.3 MG/DL (ref 0–1)
BUN SERPL-MCNC: 19 MG/DL (ref 7–20)
CALCIUM SERPL-MCNC: 10.1 MG/DL (ref 8.3–10.6)
CHLORIDE SERPL-SCNC: 97 MMOL/L (ref 99–110)
CO2 BLDV-SCNC: 34 MMOL/L
CO2 SERPL-SCNC: 32 MMOL/L (ref 21–32)
COHGB MFR BLDV: 3 % (ref 0–1.5)
CREAT SERPL-MCNC: 1.9 MG/DL (ref 0.6–1.2)
DEPRECATED RDW RBC AUTO: 13.7 % (ref 12.4–15.4)
EOSINOPHIL # BLD: 0.2 K/UL (ref 0–0.6)
EOSINOPHIL NFR BLD: 1.8 %
GFR SERPLBLD CREATININE-BSD FMLA CKD-EPI: 26 ML/MIN/{1.73_M2}
GLUCOSE SERPL-MCNC: 137 MG/DL (ref 70–99)
HCO3 BLDV-SCNC: 32.6 MMOL/L (ref 23–29)
HCT VFR BLD AUTO: 34 % (ref 36–48)
HGB BLD-MCNC: 10.7 G/DL (ref 12–16)
LYMPHOCYTES # BLD: 1.9 K/UL (ref 1–5.1)
LYMPHOCYTES NFR BLD: 20.5 %
MCH RBC QN AUTO: 29.9 PG (ref 26–34)
MCHC RBC AUTO-ENTMCNC: 31.6 G/DL (ref 31–36)
MCV RBC AUTO: 94.7 FL (ref 80–100)
METHGB MFR BLDV: 0.1 %
MONOCYTES # BLD: 0.5 K/UL (ref 0–1.3)
MONOCYTES NFR BLD: 5.3 %
NEUTROPHILS # BLD: 6.7 K/UL (ref 1.7–7.7)
NEUTROPHILS NFR BLD: 71.9 %
NT-PROBNP SERPL-MCNC: 1848 PG/ML (ref 0–449)
O2 THERAPY: ABNORMAL
PCO2 BLDV: 49.2 MMHG (ref 40–50)
PH BLDV: 7.44 [PH] (ref 7.35–7.45)
PLATELET # BLD AUTO: 278 K/UL (ref 135–450)
PMV BLD AUTO: 8.4 FL (ref 5–10.5)
PO2 BLDV: 46.3 MMHG (ref 25–40)
POTASSIUM SERPL-SCNC: 3.9 MMOL/L (ref 3.5–5.1)
PROT SERPL-MCNC: 6.8 G/DL (ref 6.4–8.2)
RBC # BLD AUTO: 3.59 M/UL (ref 4–5.2)
SAO2 % BLDV: 83 %
SARS-COV-2 RDRP RESP QL NAA+PROBE: NOT DETECTED
SODIUM SERPL-SCNC: 141 MMOL/L (ref 136–145)
TROPONIN, HIGH SENSITIVITY: 69 NG/L (ref 0–14)
WBC # BLD AUTO: 9.3 K/UL (ref 4–11)

## 2024-09-16 PROCEDURE — 94640 AIRWAY INHALATION TREATMENT: CPT

## 2024-09-16 PROCEDURE — 80053 COMPREHEN METABOLIC PANEL: CPT

## 2024-09-16 PROCEDURE — 36415 COLL VENOUS BLD VENIPUNCTURE: CPT

## 2024-09-16 PROCEDURE — 6370000000 HC RX 637 (ALT 250 FOR IP): Performed by: EMERGENCY MEDICINE

## 2024-09-16 PROCEDURE — 84484 ASSAY OF TROPONIN QUANT: CPT

## 2024-09-16 PROCEDURE — 93005 ELECTROCARDIOGRAM TRACING: CPT | Performed by: EMERGENCY MEDICINE

## 2024-09-16 PROCEDURE — 87635 SARS-COV-2 COVID-19 AMP PRB: CPT

## 2024-09-16 PROCEDURE — 99285 EMERGENCY DEPT VISIT HI MDM: CPT

## 2024-09-16 PROCEDURE — 71046 X-RAY EXAM CHEST 2 VIEWS: CPT

## 2024-09-16 PROCEDURE — 82803 BLOOD GASES ANY COMBINATION: CPT

## 2024-09-16 PROCEDURE — 2700000000 HC OXYGEN THERAPY PER DAY

## 2024-09-16 PROCEDURE — 83880 ASSAY OF NATRIURETIC PEPTIDE: CPT

## 2024-09-16 PROCEDURE — 94761 N-INVAS EAR/PLS OXIMETRY MLT: CPT

## 2024-09-16 PROCEDURE — 85025 COMPLETE CBC W/AUTO DIFF WBC: CPT

## 2024-09-16 RX ORDER — IPRATROPIUM BROMIDE AND ALBUTEROL SULFATE 2.5; .5 MG/3ML; MG/3ML
1 SOLUTION RESPIRATORY (INHALATION) ONCE
Status: COMPLETED | OUTPATIENT
Start: 2024-09-16 | End: 2024-09-16

## 2024-09-16 RX ADMIN — IPRATROPIUM BROMIDE AND ALBUTEROL SULFATE 1 DOSE: 2.5; .5 SOLUTION RESPIRATORY (INHALATION) at 23:13

## 2024-09-16 ASSESSMENT — PAIN - FUNCTIONAL ASSESSMENT: PAIN_FUNCTIONAL_ASSESSMENT: NONE - DENIES PAIN

## 2024-09-16 ASSESSMENT — LIFESTYLE VARIABLES
HOW OFTEN DO YOU HAVE A DRINK CONTAINING ALCOHOL: NEVER
HOW MANY STANDARD DRINKS CONTAINING ALCOHOL DO YOU HAVE ON A TYPICAL DAY: PATIENT DOES NOT DRINK

## 2024-09-17 ENCOUNTER — APPOINTMENT (OUTPATIENT)
Dept: GENERAL RADIOLOGY | Age: 84
DRG: 683 | End: 2024-09-17
Payer: MEDICARE

## 2024-09-17 LAB
ANION GAP SERPL CALCULATED.3IONS-SCNC: 8 MMOL/L (ref 3–16)
BILIRUB UR QL STRIP.AUTO: NEGATIVE
BUN SERPL-MCNC: 20 MG/DL (ref 7–20)
CALCIUM SERPL-MCNC: 9.5 MG/DL (ref 8.3–10.6)
CHLORIDE SERPL-SCNC: 101 MMOL/L (ref 99–110)
CLARITY UR: CLEAR
CO2 SERPL-SCNC: 31 MMOL/L (ref 21–32)
COLOR UR: YELLOW
CREAT SERPL-MCNC: 1.7 MG/DL (ref 0.6–1.2)
EKG ATRIAL RATE: 95 BPM
EKG DIAGNOSIS: NORMAL
EKG P AXIS: 86 DEGREES
EKG P-R INTERVAL: 172 MS
EKG Q-T INTERVAL: 350 MS
EKG QRS DURATION: 82 MS
EKG QTC CALCULATION (BAZETT): 439 MS
EKG R AXIS: -66 DEGREES
EKG T AXIS: 81 DEGREES
EKG VENTRICULAR RATE: 95 BPM
GFR SERPLBLD CREATININE-BSD FMLA CKD-EPI: 30 ML/MIN/{1.73_M2}
GLUCOSE SERPL-MCNC: 137 MG/DL (ref 70–99)
GLUCOSE UR STRIP.AUTO-MCNC: NEGATIVE MG/DL
HGB UR QL STRIP.AUTO: NEGATIVE
KETONES UR STRIP.AUTO-MCNC: NEGATIVE MG/DL
LEUKOCYTE ESTERASE UR QL STRIP.AUTO: NEGATIVE
NITRITE UR QL STRIP.AUTO: NEGATIVE
PH UR STRIP.AUTO: 6 [PH] (ref 5–8)
POTASSIUM SERPL-SCNC: 4.3 MMOL/L (ref 3.5–5.1)
PROT UR STRIP.AUTO-MCNC: NEGATIVE MG/DL
SODIUM SERPL-SCNC: 140 MMOL/L (ref 136–145)
SP GR UR STRIP.AUTO: 1.01 (ref 1–1.03)
TROPONIN, HIGH SENSITIVITY: 68 NG/L (ref 0–14)
UA COMPLETE W REFLEX CULTURE PNL UR: NORMAL
UA DIPSTICK W REFLEX MICRO PNL UR: NORMAL
URN SPEC COLLECT METH UR: NORMAL
UROBILINOGEN UR STRIP-ACNC: 0.2 E.U./DL

## 2024-09-17 PROCEDURE — 6370000000 HC RX 637 (ALT 250 FOR IP): Performed by: INTERNAL MEDICINE

## 2024-09-17 PROCEDURE — 2580000003 HC RX 258: Performed by: INTERNAL MEDICINE

## 2024-09-17 PROCEDURE — 6360000002 HC RX W HCPCS: Performed by: INTERNAL MEDICINE

## 2024-09-17 PROCEDURE — 93010 ELECTROCARDIOGRAM REPORT: CPT | Performed by: INTERNAL MEDICINE

## 2024-09-17 PROCEDURE — 2700000000 HC OXYGEN THERAPY PER DAY

## 2024-09-17 PROCEDURE — 97166 OT EVAL MOD COMPLEX 45 MIN: CPT

## 2024-09-17 PROCEDURE — 73110 X-RAY EXAM OF WRIST: CPT

## 2024-09-17 PROCEDURE — 92610 EVALUATE SWALLOWING FUNCTION: CPT

## 2024-09-17 PROCEDURE — 81003 URINALYSIS AUTO W/O SCOPE: CPT

## 2024-09-17 PROCEDURE — 94640 AIRWAY INHALATION TREATMENT: CPT

## 2024-09-17 PROCEDURE — 36415 COLL VENOUS BLD VENIPUNCTURE: CPT

## 2024-09-17 PROCEDURE — 80048 BASIC METABOLIC PNL TOTAL CA: CPT

## 2024-09-17 PROCEDURE — 2580000003 HC RX 258: Performed by: EMERGENCY MEDICINE

## 2024-09-17 PROCEDURE — 97530 THERAPEUTIC ACTIVITIES: CPT

## 2024-09-17 PROCEDURE — 94761 N-INVAS EAR/PLS OXIMETRY MLT: CPT

## 2024-09-17 PROCEDURE — 1200000000 HC SEMI PRIVATE

## 2024-09-17 PROCEDURE — 6370000000 HC RX 637 (ALT 250 FOR IP): Performed by: EMERGENCY MEDICINE

## 2024-09-17 RX ORDER — PANTOPRAZOLE SODIUM 40 MG/1
40 TABLET, DELAYED RELEASE ORAL 2 TIMES DAILY
Status: DISCONTINUED | OUTPATIENT
Start: 2024-09-17 | End: 2024-09-23 | Stop reason: HOSPADM

## 2024-09-17 RX ORDER — SODIUM CHLORIDE 9 MG/ML
INJECTION, SOLUTION INTRAVENOUS CONTINUOUS
Status: ACTIVE | OUTPATIENT
Start: 2024-09-17 | End: 2024-09-17

## 2024-09-17 RX ORDER — ACETAMINOPHEN 650 MG/1
650 SUPPOSITORY RECTAL EVERY 6 HOURS PRN
Status: DISCONTINUED | OUTPATIENT
Start: 2024-09-17 | End: 2024-09-23 | Stop reason: HOSPADM

## 2024-09-17 RX ORDER — ALBUTEROL SULFATE 0.83 MG/ML
2.5 SOLUTION RESPIRATORY (INHALATION)
Status: DISCONTINUED | OUTPATIENT
Start: 2024-09-17 | End: 2024-09-23 | Stop reason: HOSPADM

## 2024-09-17 RX ORDER — AMLODIPINE BESYLATE 5 MG/1
5 TABLET ORAL DAILY
Status: DISCONTINUED | OUTPATIENT
Start: 2024-09-17 | End: 2024-09-23 | Stop reason: HOSPADM

## 2024-09-17 RX ORDER — ATORVASTATIN CALCIUM 40 MG/1
40 TABLET, FILM COATED ORAL NIGHTLY
Status: DISCONTINUED | OUTPATIENT
Start: 2024-09-17 | End: 2024-09-23 | Stop reason: HOSPADM

## 2024-09-17 RX ORDER — ESCITALOPRAM OXALATE 10 MG/1
10 TABLET ORAL DAILY
Status: DISCONTINUED | OUTPATIENT
Start: 2024-09-17 | End: 2024-09-23 | Stop reason: HOSPADM

## 2024-09-17 RX ORDER — ONDANSETRON 2 MG/ML
4 INJECTION INTRAMUSCULAR; INTRAVENOUS EVERY 6 HOURS PRN
Status: DISCONTINUED | OUTPATIENT
Start: 2024-09-17 | End: 2024-09-23 | Stop reason: HOSPADM

## 2024-09-17 RX ORDER — SUCRALFATE 1 G/1
1 TABLET ORAL 4 TIMES DAILY
Status: DISCONTINUED | OUTPATIENT
Start: 2024-09-17 | End: 2024-09-23 | Stop reason: HOSPADM

## 2024-09-17 RX ORDER — SODIUM CHLORIDE 9 MG/ML
1000 INJECTION, SOLUTION INTRAVENOUS CONTINUOUS
Status: DISCONTINUED | OUTPATIENT
Start: 2024-09-17 | End: 2024-09-17

## 2024-09-17 RX ORDER — 0.9 % SODIUM CHLORIDE 0.9 %
500 INTRAVENOUS SOLUTION INTRAVENOUS ONCE
Status: COMPLETED | OUTPATIENT
Start: 2024-09-17 | End: 2024-09-17

## 2024-09-17 RX ORDER — LANOLIN ALCOHOL/MO/W.PET/CERES
400 CREAM (GRAM) TOPICAL DAILY
Status: DISCONTINUED | OUTPATIENT
Start: 2024-09-17 | End: 2024-09-23 | Stop reason: HOSPADM

## 2024-09-17 RX ORDER — SODIUM CHLORIDE 0.9 % (FLUSH) 0.9 %
5-40 SYRINGE (ML) INJECTION EVERY 12 HOURS SCHEDULED
Status: DISCONTINUED | OUTPATIENT
Start: 2024-09-17 | End: 2024-09-23 | Stop reason: HOSPADM

## 2024-09-17 RX ORDER — GUAIFENESIN 600 MG/1
600 TABLET, EXTENDED RELEASE ORAL DAILY
Status: DISCONTINUED | OUTPATIENT
Start: 2024-09-17 | End: 2024-09-23 | Stop reason: HOSPADM

## 2024-09-17 RX ORDER — BUSPIRONE HYDROCHLORIDE 5 MG/1
5 TABLET ORAL ONCE
Status: COMPLETED | OUTPATIENT
Start: 2024-09-17 | End: 2024-09-17

## 2024-09-17 RX ORDER — SODIUM CHLORIDE 9 MG/ML
INJECTION, SOLUTION INTRAVENOUS PRN
Status: DISCONTINUED | OUTPATIENT
Start: 2024-09-17 | End: 2024-09-23 | Stop reason: HOSPADM

## 2024-09-17 RX ORDER — ONDANSETRON 4 MG/1
4 TABLET, ORALLY DISINTEGRATING ORAL EVERY 8 HOURS PRN
Status: DISCONTINUED | OUTPATIENT
Start: 2024-09-17 | End: 2024-09-23 | Stop reason: HOSPADM

## 2024-09-17 RX ORDER — BUSPIRONE HYDROCHLORIDE 5 MG/1
5 TABLET ORAL EVERY 8 HOURS PRN
Status: DISCONTINUED | OUTPATIENT
Start: 2024-09-17 | End: 2024-09-18

## 2024-09-17 RX ORDER — LACTOBACILLUS RHAMNOSUS GG 10B CELL
1 CAPSULE ORAL
Status: DISCONTINUED | OUTPATIENT
Start: 2024-09-17 | End: 2024-09-23 | Stop reason: HOSPADM

## 2024-09-17 RX ORDER — ASPIRIN 325 MG
325 TABLET ORAL ONCE
Status: COMPLETED | OUTPATIENT
Start: 2024-09-17 | End: 2024-09-17

## 2024-09-17 RX ORDER — ROFLUMILAST 500 UG/1
500 TABLET ORAL DAILY
Status: DISCONTINUED | OUTPATIENT
Start: 2024-09-17 | End: 2024-09-23 | Stop reason: HOSPADM

## 2024-09-17 RX ORDER — SODIUM CHLORIDE 0.9 % (FLUSH) 0.9 %
5-40 SYRINGE (ML) INJECTION PRN
Status: DISCONTINUED | OUTPATIENT
Start: 2024-09-17 | End: 2024-09-23 | Stop reason: HOSPADM

## 2024-09-17 RX ORDER — ALBUTEROL SULFATE 0.83 MG/ML
2.5 SOLUTION RESPIRATORY (INHALATION) EVERY 4 HOURS PRN
Status: DISCONTINUED | OUTPATIENT
Start: 2024-09-17 | End: 2024-09-17

## 2024-09-17 RX ORDER — CALCIUM CARBONATE 500 MG/1
1000 TABLET, CHEWABLE ORAL 3 TIMES DAILY PRN
Status: DISCONTINUED | OUTPATIENT
Start: 2024-09-17 | End: 2024-09-23 | Stop reason: HOSPADM

## 2024-09-17 RX ORDER — ACETAMINOPHEN 325 MG/1
650 TABLET ORAL EVERY 6 HOURS PRN
Status: DISCONTINUED | OUTPATIENT
Start: 2024-09-17 | End: 2024-09-23 | Stop reason: HOSPADM

## 2024-09-17 RX ORDER — METOPROLOL TARTRATE 25 MG/1
25 TABLET, FILM COATED ORAL 2 TIMES DAILY
Status: DISCONTINUED | OUTPATIENT
Start: 2024-09-17 | End: 2024-09-23 | Stop reason: HOSPADM

## 2024-09-17 RX ADMIN — APIXABAN 2.5 MG: 2.5 TABLET, FILM COATED ORAL at 09:05

## 2024-09-17 RX ADMIN — SUCRALFATE 1 G: 1 TABLET ORAL at 09:04

## 2024-09-17 RX ADMIN — Medication 1 CAPSULE: at 09:05

## 2024-09-17 RX ADMIN — ROFLUMILAST 500 MCG: 500 TABLET ORAL at 09:11

## 2024-09-17 RX ADMIN — CALCIUM CARBONATE 1000 MG: 500 TABLET, CHEWABLE ORAL at 18:43

## 2024-09-17 RX ADMIN — ATORVASTATIN CALCIUM 40 MG: 40 TABLET, FILM COATED ORAL at 21:02

## 2024-09-17 RX ADMIN — SODIUM CHLORIDE, PRESERVATIVE FREE 10 ML: 5 INJECTION INTRAVENOUS at 21:08

## 2024-09-17 RX ADMIN — ESCITALOPRAM OXALATE 10 MG: 10 TABLET ORAL at 09:05

## 2024-09-17 RX ADMIN — BUSPIRONE HYDROCHLORIDE 5 MG: 5 TABLET ORAL at 22:57

## 2024-09-17 RX ADMIN — ONDANSETRON 4 MG: 2 INJECTION INTRAMUSCULAR; INTRAVENOUS at 21:10

## 2024-09-17 RX ADMIN — GUAIFENESIN 600 MG: 600 TABLET ORAL at 09:05

## 2024-09-17 RX ADMIN — PANTOPRAZOLE SODIUM 40 MG: 40 TABLET, DELAYED RELEASE ORAL at 21:02

## 2024-09-17 RX ADMIN — ALBUTEROL SULFATE 2.5 MG: 2.5 SOLUTION RESPIRATORY (INHALATION) at 11:15

## 2024-09-17 RX ADMIN — PANTOPRAZOLE SODIUM 40 MG: 40 TABLET, DELAYED RELEASE ORAL at 07:46

## 2024-09-17 RX ADMIN — BUSPIRONE HYDROCHLORIDE 5 MG: 5 TABLET ORAL at 14:54

## 2024-09-17 RX ADMIN — BUSPIRONE HYDROCHLORIDE 5 MG: 5 TABLET ORAL at 10:58

## 2024-09-17 RX ADMIN — ASPIRIN 325 MG: 325 TABLET ORAL at 03:05

## 2024-09-17 RX ADMIN — SODIUM CHLORIDE: 9 INJECTION, SOLUTION INTRAVENOUS at 05:11

## 2024-09-17 RX ADMIN — SUCRALFATE 1 G: 1 TABLET ORAL at 21:03

## 2024-09-17 RX ADMIN — SODIUM CHLORIDE 500 ML: 9 INJECTION, SOLUTION INTRAVENOUS at 01:46

## 2024-09-17 RX ADMIN — METOPROLOL TARTRATE 25 MG: 25 TABLET, FILM COATED ORAL at 09:04

## 2024-09-17 RX ADMIN — ALBUTEROL SULFATE 2.5 MG: 2.5 SOLUTION RESPIRATORY (INHALATION) at 16:21

## 2024-09-17 RX ADMIN — AMLODIPINE BESYLATE 5 MG: 5 TABLET ORAL at 09:05

## 2024-09-17 RX ADMIN — METOPROLOL TARTRATE 25 MG: 25 TABLET, FILM COATED ORAL at 21:02

## 2024-09-17 RX ADMIN — Medication 400 MG: at 09:05

## 2024-09-17 RX ADMIN — ALBUTEROL SULFATE 2.5 MG: 2.5 SOLUTION RESPIRATORY (INHALATION) at 19:19

## 2024-09-17 RX ADMIN — SUCRALFATE 1 G: 1 TABLET ORAL at 16:59

## 2024-09-17 RX ADMIN — APIXABAN 2.5 MG: 2.5 TABLET, FILM COATED ORAL at 21:02

## 2024-09-17 ASSESSMENT — PAIN SCALES - GENERAL: PAINLEVEL_OUTOF10: 0

## 2024-09-18 LAB
ANION GAP SERPL CALCULATED.3IONS-SCNC: 7 MMOL/L (ref 3–16)
BUN SERPL-MCNC: 19 MG/DL (ref 7–20)
CALCIUM SERPL-MCNC: 9.4 MG/DL (ref 8.3–10.6)
CHLORIDE SERPL-SCNC: 105 MMOL/L (ref 99–110)
CO2 SERPL-SCNC: 30 MMOL/L (ref 21–32)
CREAT SERPL-MCNC: 1.4 MG/DL (ref 0.6–1.2)
DEPRECATED RDW RBC AUTO: 13.7 % (ref 12.4–15.4)
GFR SERPLBLD CREATININE-BSD FMLA CKD-EPI: 37 ML/MIN/{1.73_M2}
GLUCOSE SERPL-MCNC: 99 MG/DL (ref 70–99)
HCT VFR BLD AUTO: 31 % (ref 36–48)
HGB BLD-MCNC: 9.9 G/DL (ref 12–16)
MCH RBC QN AUTO: 30.2 PG (ref 26–34)
MCHC RBC AUTO-ENTMCNC: 31.9 G/DL (ref 31–36)
MCV RBC AUTO: 94.8 FL (ref 80–100)
PLATELET # BLD AUTO: 235 K/UL (ref 135–450)
PMV BLD AUTO: 7.8 FL (ref 5–10.5)
POTASSIUM SERPL-SCNC: 4.1 MMOL/L (ref 3.5–5.1)
RBC # BLD AUTO: 3.27 M/UL (ref 4–5.2)
SODIUM SERPL-SCNC: 142 MMOL/L (ref 136–145)
WBC # BLD AUTO: 6.6 K/UL (ref 4–11)

## 2024-09-18 PROCEDURE — 2580000003 HC RX 258: Performed by: INTERNAL MEDICINE

## 2024-09-18 PROCEDURE — 6370000000 HC RX 637 (ALT 250 FOR IP): Performed by: INTERNAL MEDICINE

## 2024-09-18 PROCEDURE — 97530 THERAPEUTIC ACTIVITIES: CPT

## 2024-09-18 PROCEDURE — 6370000000 HC RX 637 (ALT 250 FOR IP): Performed by: STUDENT IN AN ORGANIZED HEALTH CARE EDUCATION/TRAINING PROGRAM

## 2024-09-18 PROCEDURE — 85027 COMPLETE CBC AUTOMATED: CPT

## 2024-09-18 PROCEDURE — 94761 N-INVAS EAR/PLS OXIMETRY MLT: CPT

## 2024-09-18 PROCEDURE — 80048 BASIC METABOLIC PNL TOTAL CA: CPT

## 2024-09-18 PROCEDURE — BD11ZZZ FLUOROSCOPY OF ESOPHAGUS: ICD-10-PCS | Performed by: INTERNAL MEDICINE

## 2024-09-18 PROCEDURE — 97162 PT EVAL MOD COMPLEX 30 MIN: CPT

## 2024-09-18 PROCEDURE — 6360000002 HC RX W HCPCS: Performed by: INTERNAL MEDICINE

## 2024-09-18 PROCEDURE — 94640 AIRWAY INHALATION TREATMENT: CPT

## 2024-09-18 PROCEDURE — 2700000000 HC OXYGEN THERAPY PER DAY

## 2024-09-18 PROCEDURE — 1200000000 HC SEMI PRIVATE

## 2024-09-18 PROCEDURE — 36415 COLL VENOUS BLD VENIPUNCTURE: CPT

## 2024-09-18 RX ORDER — IPRATROPIUM BROMIDE AND ALBUTEROL SULFATE 2.5; .5 MG/3ML; MG/3ML
1 SOLUTION RESPIRATORY (INHALATION) EVERY 4 HOURS PRN
Status: DISCONTINUED | OUTPATIENT
Start: 2024-09-18 | End: 2024-09-23 | Stop reason: HOSPADM

## 2024-09-18 RX ORDER — BUSPIRONE HYDROCHLORIDE 5 MG/1
5 TABLET ORAL EVERY 6 HOURS PRN
Status: DISCONTINUED | OUTPATIENT
Start: 2024-09-18 | End: 2024-09-23 | Stop reason: HOSPADM

## 2024-09-18 RX ADMIN — SODIUM CHLORIDE, PRESERVATIVE FREE 10 ML: 5 INJECTION INTRAVENOUS at 09:30

## 2024-09-18 RX ADMIN — ONDANSETRON 4 MG: 2 INJECTION INTRAMUSCULAR; INTRAVENOUS at 14:41

## 2024-09-18 RX ADMIN — SUCRALFATE 1 G: 1 TABLET ORAL at 09:30

## 2024-09-18 RX ADMIN — ESCITALOPRAM OXALATE 10 MG: 10 TABLET ORAL at 09:30

## 2024-09-18 RX ADMIN — ALBUTEROL SULFATE 2.5 MG: 2.5 SOLUTION RESPIRATORY (INHALATION) at 20:57

## 2024-09-18 RX ADMIN — SUCRALFATE 1 G: 1 TABLET ORAL at 14:34

## 2024-09-18 RX ADMIN — ACETAMINOPHEN 650 MG: 325 TABLET ORAL at 02:33

## 2024-09-18 RX ADMIN — ACETAMINOPHEN 650 MG: 325 TABLET ORAL at 20:24

## 2024-09-18 RX ADMIN — Medication 400 MG: at 09:30

## 2024-09-18 RX ADMIN — BUSPIRONE HYDROCHLORIDE 5 MG: 5 TABLET ORAL at 14:34

## 2024-09-18 RX ADMIN — ONDANSETRON 4 MG: 2 INJECTION INTRAMUSCULAR; INTRAVENOUS at 19:46

## 2024-09-18 RX ADMIN — IPRATROPIUM BROMIDE AND ALBUTEROL SULFATE 1 DOSE: 2.5; .5 SOLUTION RESPIRATORY (INHALATION) at 06:17

## 2024-09-18 RX ADMIN — METOPROLOL TARTRATE 25 MG: 25 TABLET, FILM COATED ORAL at 09:30

## 2024-09-18 RX ADMIN — ALBUTEROL SULFATE 2.5 MG: 2.5 SOLUTION RESPIRATORY (INHALATION) at 11:54

## 2024-09-18 RX ADMIN — GUAIFENESIN 600 MG: 600 TABLET ORAL at 09:30

## 2024-09-18 RX ADMIN — APIXABAN 2.5 MG: 2.5 TABLET, FILM COATED ORAL at 20:25

## 2024-09-18 RX ADMIN — ALBUTEROL SULFATE 2.5 MG: 2.5 SOLUTION RESPIRATORY (INHALATION) at 15:25

## 2024-09-18 RX ADMIN — ROFLUMILAST 500 MCG: 500 TABLET ORAL at 09:30

## 2024-09-18 RX ADMIN — AMLODIPINE BESYLATE 5 MG: 5 TABLET ORAL at 09:30

## 2024-09-18 RX ADMIN — PANTOPRAZOLE SODIUM 40 MG: 40 TABLET, DELAYED RELEASE ORAL at 20:25

## 2024-09-18 RX ADMIN — BUSPIRONE HYDROCHLORIDE 5 MG: 5 TABLET ORAL at 05:59

## 2024-09-18 RX ADMIN — SUCRALFATE 1 G: 1 TABLET ORAL at 20:25

## 2024-09-18 RX ADMIN — Medication 1 CAPSULE: at 09:30

## 2024-09-18 RX ADMIN — ATORVASTATIN CALCIUM 40 MG: 40 TABLET, FILM COATED ORAL at 20:25

## 2024-09-18 RX ADMIN — METOPROLOL TARTRATE 25 MG: 25 TABLET, FILM COATED ORAL at 20:25

## 2024-09-18 RX ADMIN — SODIUM CHLORIDE, PRESERVATIVE FREE 10 ML: 5 INJECTION INTRAVENOUS at 19:46

## 2024-09-18 RX ADMIN — BUSPIRONE HYDROCHLORIDE 5 MG: 5 TABLET ORAL at 23:37

## 2024-09-18 RX ADMIN — PANTOPRAZOLE SODIUM 40 MG: 40 TABLET, DELAYED RELEASE ORAL at 09:30

## 2024-09-18 RX ADMIN — APIXABAN 2.5 MG: 2.5 TABLET, FILM COATED ORAL at 09:30

## 2024-09-18 RX ADMIN — ALBUTEROL SULFATE 2.5 MG: 2.5 SOLUTION RESPIRATORY (INHALATION) at 08:33

## 2024-09-18 ASSESSMENT — PAIN DESCRIPTION - ORIENTATION: ORIENTATION: RIGHT

## 2024-09-18 ASSESSMENT — PAIN SCALES - GENERAL
PAINLEVEL_OUTOF10: 5
PAINLEVEL_OUTOF10: 7

## 2024-09-18 ASSESSMENT — PAIN DESCRIPTION - DESCRIPTORS
DESCRIPTORS: ACHING
DESCRIPTORS: ACHING

## 2024-09-18 ASSESSMENT — PAIN DESCRIPTION - LOCATION
LOCATION: HEAD
LOCATION: HEAD

## 2024-09-19 ENCOUNTER — APPOINTMENT (OUTPATIENT)
Dept: GENERAL RADIOLOGY | Age: 84
DRG: 683 | End: 2024-09-19
Payer: MEDICARE

## 2024-09-19 LAB
ANION GAP SERPL CALCULATED.3IONS-SCNC: 3 MMOL/L (ref 3–16)
BUN SERPL-MCNC: 14 MG/DL (ref 7–20)
CALCIUM SERPL-MCNC: 9.6 MG/DL (ref 8.3–10.6)
CHLORIDE SERPL-SCNC: 106 MMOL/L (ref 99–110)
CO2 SERPL-SCNC: 35 MMOL/L (ref 21–32)
CREAT SERPL-MCNC: 1.3 MG/DL (ref 0.6–1.2)
GFR SERPLBLD CREATININE-BSD FMLA CKD-EPI: 41 ML/MIN/{1.73_M2}
GLUCOSE SERPL-MCNC: 109 MG/DL (ref 70–99)
POTASSIUM SERPL-SCNC: 3.8 MMOL/L (ref 3.5–5.1)
SODIUM SERPL-SCNC: 144 MMOL/L (ref 136–145)

## 2024-09-19 PROCEDURE — 6360000002 HC RX W HCPCS: Performed by: INTERNAL MEDICINE

## 2024-09-19 PROCEDURE — 2700000000 HC OXYGEN THERAPY PER DAY

## 2024-09-19 PROCEDURE — 1200000000 HC SEMI PRIVATE

## 2024-09-19 PROCEDURE — 74220 X-RAY XM ESOPHAGUS 1CNTRST: CPT

## 2024-09-19 PROCEDURE — 36415 COLL VENOUS BLD VENIPUNCTURE: CPT

## 2024-09-19 PROCEDURE — 6370000000 HC RX 637 (ALT 250 FOR IP): Performed by: INTERNAL MEDICINE

## 2024-09-19 PROCEDURE — 6370000000 HC RX 637 (ALT 250 FOR IP): Performed by: STUDENT IN AN ORGANIZED HEALTH CARE EDUCATION/TRAINING PROGRAM

## 2024-09-19 PROCEDURE — 94761 N-INVAS EAR/PLS OXIMETRY MLT: CPT

## 2024-09-19 PROCEDURE — 94640 AIRWAY INHALATION TREATMENT: CPT

## 2024-09-19 PROCEDURE — 2580000003 HC RX 258: Performed by: INTERNAL MEDICINE

## 2024-09-19 PROCEDURE — 80048 BASIC METABOLIC PNL TOTAL CA: CPT

## 2024-09-19 RX ADMIN — SODIUM CHLORIDE, PRESERVATIVE FREE 10 ML: 5 INJECTION INTRAVENOUS at 19:51

## 2024-09-19 RX ADMIN — ROFLUMILAST 500 MCG: 500 TABLET ORAL at 09:30

## 2024-09-19 RX ADMIN — Medication 400 MG: at 09:30

## 2024-09-19 RX ADMIN — ONDANSETRON 4 MG: 2 INJECTION INTRAMUSCULAR; INTRAVENOUS at 20:10

## 2024-09-19 RX ADMIN — PANTOPRAZOLE SODIUM 40 MG: 40 TABLET, DELAYED RELEASE ORAL at 09:30

## 2024-09-19 RX ADMIN — AMLODIPINE BESYLATE 5 MG: 5 TABLET ORAL at 09:30

## 2024-09-19 RX ADMIN — ACETAMINOPHEN 650 MG: 325 TABLET ORAL at 15:03

## 2024-09-19 RX ADMIN — ATORVASTATIN CALCIUM 40 MG: 40 TABLET, FILM COATED ORAL at 20:21

## 2024-09-19 RX ADMIN — SODIUM CHLORIDE, PRESERVATIVE FREE 10 ML: 5 INJECTION INTRAVENOUS at 09:30

## 2024-09-19 RX ADMIN — SUCRALFATE 1 G: 1 TABLET ORAL at 09:30

## 2024-09-19 RX ADMIN — ALBUTEROL SULFATE 2.5 MG: 2.5 SOLUTION RESPIRATORY (INHALATION) at 19:22

## 2024-09-19 RX ADMIN — ONDANSETRON 4 MG: 2 INJECTION INTRAMUSCULAR; INTRAVENOUS at 01:43

## 2024-09-19 RX ADMIN — METOPROLOL TARTRATE 25 MG: 25 TABLET, FILM COATED ORAL at 09:30

## 2024-09-19 RX ADMIN — ONDANSETRON 4 MG: 2 INJECTION INTRAMUSCULAR; INTRAVENOUS at 09:53

## 2024-09-19 RX ADMIN — ALBUTEROL SULFATE 2.5 MG: 2.5 SOLUTION RESPIRATORY (INHALATION) at 07:49

## 2024-09-19 RX ADMIN — IPRATROPIUM BROMIDE AND ALBUTEROL SULFATE 1 DOSE: 2.5; .5 SOLUTION RESPIRATORY (INHALATION) at 23:08

## 2024-09-19 RX ADMIN — PANTOPRAZOLE SODIUM 40 MG: 40 TABLET, DELAYED RELEASE ORAL at 20:21

## 2024-09-19 RX ADMIN — APIXABAN 2.5 MG: 2.5 TABLET, FILM COATED ORAL at 20:21

## 2024-09-19 RX ADMIN — SUCRALFATE 1 G: 1 TABLET ORAL at 20:21

## 2024-09-19 RX ADMIN — BUSPIRONE HYDROCHLORIDE 5 MG: 5 TABLET ORAL at 22:54

## 2024-09-19 RX ADMIN — ALBUTEROL SULFATE 2.5 MG: 2.5 SOLUTION RESPIRATORY (INHALATION) at 11:50

## 2024-09-19 RX ADMIN — GUAIFENESIN 600 MG: 600 TABLET ORAL at 09:30

## 2024-09-19 RX ADMIN — CARBAMIDE PEROXIDE 6.5% 10 DROP: 6.5 LIQUID AURICULAR (OTIC) at 16:34

## 2024-09-19 RX ADMIN — BUSPIRONE HYDROCHLORIDE 5 MG: 5 TABLET ORAL at 07:27

## 2024-09-19 RX ADMIN — METOPROLOL TARTRATE 25 MG: 25 TABLET, FILM COATED ORAL at 20:21

## 2024-09-19 RX ADMIN — Medication 1 CAPSULE: at 09:30

## 2024-09-19 RX ADMIN — APIXABAN 2.5 MG: 2.5 TABLET, FILM COATED ORAL at 09:30

## 2024-09-19 RX ADMIN — ESCITALOPRAM OXALATE 10 MG: 10 TABLET ORAL at 09:30

## 2024-09-19 ASSESSMENT — PAIN SCALES - GENERAL: PAINLEVEL_OUTOF10: 1

## 2024-09-20 LAB
ANION GAP SERPL CALCULATED.3IONS-SCNC: 5 MMOL/L (ref 3–16)
BUN SERPL-MCNC: 11 MG/DL (ref 7–20)
CALCIUM SERPL-MCNC: 9.6 MG/DL (ref 8.3–10.6)
CHLORIDE SERPL-SCNC: 104 MMOL/L (ref 99–110)
CO2 SERPL-SCNC: 34 MMOL/L (ref 21–32)
CREAT SERPL-MCNC: 1.3 MG/DL (ref 0.6–1.2)
GFR SERPLBLD CREATININE-BSD FMLA CKD-EPI: 41 ML/MIN/{1.73_M2}
GLUCOSE SERPL-MCNC: 110 MG/DL (ref 70–99)
POTASSIUM SERPL-SCNC: 3.7 MMOL/L (ref 3.5–5.1)
SODIUM SERPL-SCNC: 143 MMOL/L (ref 136–145)

## 2024-09-20 PROCEDURE — 6370000000 HC RX 637 (ALT 250 FOR IP): Performed by: INTERNAL MEDICINE

## 2024-09-20 PROCEDURE — 2580000003 HC RX 258: Performed by: INTERNAL MEDICINE

## 2024-09-20 PROCEDURE — 97530 THERAPEUTIC ACTIVITIES: CPT

## 2024-09-20 PROCEDURE — 1200000000 HC SEMI PRIVATE

## 2024-09-20 PROCEDURE — 94761 N-INVAS EAR/PLS OXIMETRY MLT: CPT

## 2024-09-20 PROCEDURE — 97535 SELF CARE MNGMENT TRAINING: CPT

## 2024-09-20 PROCEDURE — 97110 THERAPEUTIC EXERCISES: CPT

## 2024-09-20 PROCEDURE — 6360000002 HC RX W HCPCS: Performed by: INTERNAL MEDICINE

## 2024-09-20 PROCEDURE — 94640 AIRWAY INHALATION TREATMENT: CPT

## 2024-09-20 PROCEDURE — 6370000000 HC RX 637 (ALT 250 FOR IP): Performed by: STUDENT IN AN ORGANIZED HEALTH CARE EDUCATION/TRAINING PROGRAM

## 2024-09-20 PROCEDURE — 2700000000 HC OXYGEN THERAPY PER DAY

## 2024-09-20 PROCEDURE — 80048 BASIC METABOLIC PNL TOTAL CA: CPT

## 2024-09-20 PROCEDURE — 36415 COLL VENOUS BLD VENIPUNCTURE: CPT

## 2024-09-20 RX ADMIN — METOPROLOL TARTRATE 25 MG: 25 TABLET, FILM COATED ORAL at 08:30

## 2024-09-20 RX ADMIN — SODIUM CHLORIDE, PRESERVATIVE FREE 10 ML: 5 INJECTION INTRAVENOUS at 20:18

## 2024-09-20 RX ADMIN — ATORVASTATIN CALCIUM 40 MG: 40 TABLET, FILM COATED ORAL at 20:13

## 2024-09-20 RX ADMIN — ALBUTEROL SULFATE 2.5 MG: 2.5 SOLUTION RESPIRATORY (INHALATION) at 16:36

## 2024-09-20 RX ADMIN — BUSPIRONE HYDROCHLORIDE 5 MG: 5 TABLET ORAL at 06:06

## 2024-09-20 RX ADMIN — SODIUM CHLORIDE, PRESERVATIVE FREE 10 ML: 5 INJECTION INTRAVENOUS at 20:17

## 2024-09-20 RX ADMIN — PANTOPRAZOLE SODIUM 40 MG: 40 TABLET, DELAYED RELEASE ORAL at 20:13

## 2024-09-20 RX ADMIN — ROFLUMILAST 500 MCG: 500 TABLET ORAL at 08:31

## 2024-09-20 RX ADMIN — ONDANSETRON 4 MG: 2 INJECTION INTRAMUSCULAR; INTRAVENOUS at 06:06

## 2024-09-20 RX ADMIN — BUSPIRONE HYDROCHLORIDE 5 MG: 5 TABLET ORAL at 20:13

## 2024-09-20 RX ADMIN — IPRATROPIUM BROMIDE AND ALBUTEROL SULFATE 1 DOSE: 2.5; .5 SOLUTION RESPIRATORY (INHALATION) at 23:41

## 2024-09-20 RX ADMIN — CARBAMIDE PEROXIDE 6.5% 10 DROP: 6.5 LIQUID AURICULAR (OTIC) at 20:17

## 2024-09-20 RX ADMIN — APIXABAN 2.5 MG: 2.5 TABLET, FILM COATED ORAL at 20:13

## 2024-09-20 RX ADMIN — BUSPIRONE HYDROCHLORIDE 5 MG: 5 TABLET ORAL at 13:36

## 2024-09-20 RX ADMIN — APIXABAN 2.5 MG: 2.5 TABLET, FILM COATED ORAL at 08:32

## 2024-09-20 RX ADMIN — ESCITALOPRAM OXALATE 10 MG: 10 TABLET ORAL at 08:35

## 2024-09-20 RX ADMIN — Medication 400 MG: at 08:34

## 2024-09-20 RX ADMIN — SUCRALFATE 1 G: 1 TABLET ORAL at 13:09

## 2024-09-20 RX ADMIN — PANTOPRAZOLE SODIUM 40 MG: 40 TABLET, DELAYED RELEASE ORAL at 08:32

## 2024-09-20 RX ADMIN — SUCRALFATE 1 G: 1 TABLET ORAL at 17:14

## 2024-09-20 RX ADMIN — METOPROLOL TARTRATE 25 MG: 25 TABLET, FILM COATED ORAL at 20:13

## 2024-09-20 RX ADMIN — ALBUTEROL SULFATE 2.5 MG: 2.5 SOLUTION RESPIRATORY (INHALATION) at 20:23

## 2024-09-20 RX ADMIN — GUAIFENESIN 600 MG: 600 TABLET ORAL at 08:34

## 2024-09-20 RX ADMIN — ALBUTEROL SULFATE 2.5 MG: 2.5 SOLUTION RESPIRATORY (INHALATION) at 11:44

## 2024-09-20 RX ADMIN — Medication 1 CAPSULE: at 08:35

## 2024-09-20 RX ADMIN — ALBUTEROL SULFATE 2.5 MG: 2.5 SOLUTION RESPIRATORY (INHALATION) at 08:48

## 2024-09-20 RX ADMIN — AMLODIPINE BESYLATE 5 MG: 5 TABLET ORAL at 08:31

## 2024-09-20 RX ADMIN — SUCRALFATE 1 G: 1 TABLET ORAL at 20:13

## 2024-09-20 RX ADMIN — CARBAMIDE PEROXIDE 6.5% 10 DROP: 6.5 LIQUID AURICULAR (OTIC) at 08:37

## 2024-09-20 RX ADMIN — SUCRALFATE 1 G: 1 TABLET ORAL at 08:33

## 2024-09-20 RX ADMIN — SODIUM CHLORIDE, PRESERVATIVE FREE 10 ML: 5 INJECTION INTRAVENOUS at 08:28

## 2024-09-20 ASSESSMENT — PAIN DESCRIPTION - LOCATION: LOCATION: HEAD

## 2024-09-20 ASSESSMENT — PAIN SCALES - GENERAL
PAINLEVEL_OUTOF10: 0
PAINLEVEL_OUTOF10: 4
PAINLEVEL_OUTOF10: 0

## 2024-09-20 ASSESSMENT — PAIN DESCRIPTION - DESCRIPTORS: DESCRIPTORS: BURNING

## 2024-09-21 LAB
ANION GAP SERPL CALCULATED.3IONS-SCNC: 6 MMOL/L (ref 3–16)
BUN SERPL-MCNC: 9 MG/DL (ref 7–20)
CALCIUM SERPL-MCNC: 9.7 MG/DL (ref 8.3–10.6)
CHLORIDE SERPL-SCNC: 103 MMOL/L (ref 99–110)
CO2 SERPL-SCNC: 29 MMOL/L (ref 21–32)
CREAT SERPL-MCNC: 1.2 MG/DL (ref 0.6–1.2)
GFR SERPLBLD CREATININE-BSD FMLA CKD-EPI: 45 ML/MIN/{1.73_M2}
GLUCOSE SERPL-MCNC: 100 MG/DL (ref 70–99)
POTASSIUM SERPL-SCNC: 3.8 MMOL/L (ref 3.5–5.1)
SODIUM SERPL-SCNC: 138 MMOL/L (ref 136–145)

## 2024-09-21 PROCEDURE — 6360000002 HC RX W HCPCS: Performed by: INTERNAL MEDICINE

## 2024-09-21 PROCEDURE — 94640 AIRWAY INHALATION TREATMENT: CPT

## 2024-09-21 PROCEDURE — 6370000000 HC RX 637 (ALT 250 FOR IP): Performed by: INTERNAL MEDICINE

## 2024-09-21 PROCEDURE — 80048 BASIC METABOLIC PNL TOTAL CA: CPT

## 2024-09-21 PROCEDURE — 2580000003 HC RX 258: Performed by: INTERNAL MEDICINE

## 2024-09-21 PROCEDURE — 2700000000 HC OXYGEN THERAPY PER DAY

## 2024-09-21 PROCEDURE — 36415 COLL VENOUS BLD VENIPUNCTURE: CPT

## 2024-09-21 PROCEDURE — 92526 ORAL FUNCTION THERAPY: CPT

## 2024-09-21 PROCEDURE — 94761 N-INVAS EAR/PLS OXIMETRY MLT: CPT

## 2024-09-21 PROCEDURE — 1200000000 HC SEMI PRIVATE

## 2024-09-21 RX ADMIN — ALBUTEROL SULFATE 2.5 MG: 2.5 SOLUTION RESPIRATORY (INHALATION) at 11:44

## 2024-09-21 RX ADMIN — PANTOPRAZOLE SODIUM 40 MG: 40 TABLET, DELAYED RELEASE ORAL at 09:52

## 2024-09-21 RX ADMIN — SUCRALFATE 1 G: 1 TABLET ORAL at 17:53

## 2024-09-21 RX ADMIN — SUCRALFATE 1 G: 1 TABLET ORAL at 21:03

## 2024-09-21 RX ADMIN — ONDANSETRON 4 MG: 2 INJECTION INTRAMUSCULAR; INTRAVENOUS at 08:45

## 2024-09-21 RX ADMIN — BUSPIRONE HYDROCHLORIDE 5 MG: 5 TABLET ORAL at 05:34

## 2024-09-21 RX ADMIN — PANTOPRAZOLE SODIUM 40 MG: 40 TABLET, DELAYED RELEASE ORAL at 21:03

## 2024-09-21 RX ADMIN — ROFLUMILAST 500 MCG: 500 TABLET ORAL at 09:54

## 2024-09-21 RX ADMIN — ATORVASTATIN CALCIUM 40 MG: 40 TABLET, FILM COATED ORAL at 21:03

## 2024-09-21 RX ADMIN — ALBUTEROL SULFATE 2.5 MG: 2.5 SOLUTION RESPIRATORY (INHALATION) at 08:34

## 2024-09-21 RX ADMIN — SODIUM CHLORIDE, PRESERVATIVE FREE 10 ML: 5 INJECTION INTRAVENOUS at 21:04

## 2024-09-21 RX ADMIN — ACETAMINOPHEN 650 MG: 325 TABLET ORAL at 21:02

## 2024-09-21 RX ADMIN — GUAIFENESIN 600 MG: 600 TABLET ORAL at 09:52

## 2024-09-21 RX ADMIN — ALBUTEROL SULFATE 2.5 MG: 2.5 SOLUTION RESPIRATORY (INHALATION) at 19:33

## 2024-09-21 RX ADMIN — METOPROLOL TARTRATE 25 MG: 25 TABLET, FILM COATED ORAL at 09:49

## 2024-09-21 RX ADMIN — ALBUTEROL SULFATE 2.5 MG: 2.5 SOLUTION RESPIRATORY (INHALATION) at 23:33

## 2024-09-21 RX ADMIN — SUCRALFATE 1 G: 1 TABLET ORAL at 14:30

## 2024-09-21 RX ADMIN — Medication 1 CAPSULE: at 09:52

## 2024-09-21 RX ADMIN — SODIUM CHLORIDE, PRESERVATIVE FREE 10 ML: 5 INJECTION INTRAVENOUS at 08:47

## 2024-09-21 RX ADMIN — SUCRALFATE 1 G: 1 TABLET ORAL at 09:52

## 2024-09-21 RX ADMIN — ALBUTEROL SULFATE 2.5 MG: 2.5 SOLUTION RESPIRATORY (INHALATION) at 15:50

## 2024-09-21 RX ADMIN — CARBAMIDE PEROXIDE 6.5% 10 DROP: 6.5 LIQUID AURICULAR (OTIC) at 08:51

## 2024-09-21 RX ADMIN — ACETAMINOPHEN 650 MG: 325 TABLET ORAL at 05:34

## 2024-09-21 RX ADMIN — AMLODIPINE BESYLATE 5 MG: 5 TABLET ORAL at 09:51

## 2024-09-21 RX ADMIN — Medication 400 MG: at 09:52

## 2024-09-21 RX ADMIN — ESCITALOPRAM OXALATE 10 MG: 10 TABLET ORAL at 09:51

## 2024-09-21 RX ADMIN — METOPROLOL TARTRATE 25 MG: 25 TABLET, FILM COATED ORAL at 21:02

## 2024-09-21 RX ADMIN — CALCIUM CARBONATE 1000 MG: 500 TABLET, CHEWABLE ORAL at 00:51

## 2024-09-21 RX ADMIN — BUSPIRONE HYDROCHLORIDE 5 MG: 5 TABLET ORAL at 21:02

## 2024-09-21 ASSESSMENT — PAIN SCALES - WONG BAKER
WONGBAKER_NUMERICALRESPONSE: NO HURT

## 2024-09-21 ASSESSMENT — PAIN - FUNCTIONAL ASSESSMENT: PAIN_FUNCTIONAL_ASSESSMENT: ACTIVITIES ARE NOT PREVENTED

## 2024-09-21 ASSESSMENT — PAIN DESCRIPTION - DESCRIPTORS
DESCRIPTORS: ACHING

## 2024-09-21 ASSESSMENT — PAIN DESCRIPTION - PAIN TYPE
TYPE: ACUTE PAIN

## 2024-09-21 ASSESSMENT — PAIN SCALES - GENERAL
PAINLEVEL_OUTOF10: 7
PAINLEVEL_OUTOF10: 3
PAINLEVEL_OUTOF10: 0
PAINLEVEL_OUTOF10: 0
PAINLEVEL_OUTOF10: 5

## 2024-09-21 ASSESSMENT — PAIN DESCRIPTION - LOCATION
LOCATION: HEAD
LOCATION: EAR
LOCATION: HEAD

## 2024-09-21 ASSESSMENT — PAIN DESCRIPTION - FREQUENCY: FREQUENCY: CONTINUOUS

## 2024-09-21 ASSESSMENT — PAIN DESCRIPTION - ORIENTATION: ORIENTATION: RIGHT

## 2024-09-21 ASSESSMENT — PAIN DESCRIPTION - ONSET: ONSET: ON-GOING

## 2024-09-22 PROCEDURE — 6360000002 HC RX W HCPCS: Performed by: INTERNAL MEDICINE

## 2024-09-22 PROCEDURE — 1200000000 HC SEMI PRIVATE

## 2024-09-22 PROCEDURE — 94640 AIRWAY INHALATION TREATMENT: CPT

## 2024-09-22 PROCEDURE — 6370000000 HC RX 637 (ALT 250 FOR IP): Performed by: INTERNAL MEDICINE

## 2024-09-22 PROCEDURE — 6370000000 HC RX 637 (ALT 250 FOR IP): Performed by: STUDENT IN AN ORGANIZED HEALTH CARE EDUCATION/TRAINING PROGRAM

## 2024-09-22 PROCEDURE — 94761 N-INVAS EAR/PLS OXIMETRY MLT: CPT

## 2024-09-22 PROCEDURE — 2700000000 HC OXYGEN THERAPY PER DAY

## 2024-09-22 PROCEDURE — 2580000003 HC RX 258: Performed by: INTERNAL MEDICINE

## 2024-09-22 RX ADMIN — BUSPIRONE HYDROCHLORIDE 5 MG: 5 TABLET ORAL at 05:03

## 2024-09-22 RX ADMIN — ONDANSETRON 4 MG: 2 INJECTION INTRAMUSCULAR; INTRAVENOUS at 02:52

## 2024-09-22 RX ADMIN — CARBAMIDE PEROXIDE 6.5% 10 DROP: 6.5 LIQUID AURICULAR (OTIC) at 09:48

## 2024-09-22 RX ADMIN — CARBAMIDE PEROXIDE 6.5% 10 DROP: 6.5 LIQUID AURICULAR (OTIC) at 21:08

## 2024-09-22 RX ADMIN — PANTOPRAZOLE SODIUM 40 MG: 40 TABLET, DELAYED RELEASE ORAL at 21:00

## 2024-09-22 RX ADMIN — BUSPIRONE HYDROCHLORIDE 5 MG: 5 TABLET ORAL at 21:17

## 2024-09-22 RX ADMIN — SUCRALFATE 1 G: 1 TABLET ORAL at 12:28

## 2024-09-22 RX ADMIN — GUAIFENESIN 600 MG: 600 TABLET ORAL at 09:26

## 2024-09-22 RX ADMIN — SUCRALFATE 1 G: 1 TABLET ORAL at 16:48

## 2024-09-22 RX ADMIN — Medication 1 CAPSULE: at 09:26

## 2024-09-22 RX ADMIN — Medication 400 MG: at 09:27

## 2024-09-22 RX ADMIN — ESCITALOPRAM OXALATE 10 MG: 10 TABLET ORAL at 09:27

## 2024-09-22 RX ADMIN — SODIUM CHLORIDE, PRESERVATIVE FREE 10 ML: 5 INJECTION INTRAVENOUS at 09:43

## 2024-09-22 RX ADMIN — BUSPIRONE HYDROCHLORIDE 5 MG: 5 TABLET ORAL at 11:00

## 2024-09-22 RX ADMIN — AMLODIPINE BESYLATE 5 MG: 5 TABLET ORAL at 09:26

## 2024-09-22 RX ADMIN — ACETAMINOPHEN 650 MG: 325 TABLET ORAL at 20:57

## 2024-09-22 RX ADMIN — PANTOPRAZOLE SODIUM 40 MG: 40 TABLET, DELAYED RELEASE ORAL at 09:26

## 2024-09-22 RX ADMIN — ALBUTEROL SULFATE 2.5 MG: 2.5 SOLUTION RESPIRATORY (INHALATION) at 08:15

## 2024-09-22 RX ADMIN — ATORVASTATIN CALCIUM 40 MG: 40 TABLET, FILM COATED ORAL at 20:59

## 2024-09-22 RX ADMIN — SODIUM CHLORIDE, PRESERVATIVE FREE 10 ML: 5 INJECTION INTRAVENOUS at 21:00

## 2024-09-22 RX ADMIN — ALBUTEROL SULFATE 2.5 MG: 2.5 SOLUTION RESPIRATORY (INHALATION) at 11:42

## 2024-09-22 RX ADMIN — METOPROLOL TARTRATE 25 MG: 25 TABLET, FILM COATED ORAL at 09:26

## 2024-09-22 RX ADMIN — ALBUTEROL SULFATE 2.5 MG: 2.5 SOLUTION RESPIRATORY (INHALATION) at 15:58

## 2024-09-22 RX ADMIN — ALBUTEROL SULFATE 2.5 MG: 2.5 SOLUTION RESPIRATORY (INHALATION) at 19:53

## 2024-09-22 RX ADMIN — METOPROLOL TARTRATE 25 MG: 25 TABLET, FILM COATED ORAL at 20:59

## 2024-09-22 RX ADMIN — ONDANSETRON 4 MG: 2 INJECTION INTRAMUSCULAR; INTRAVENOUS at 20:57

## 2024-09-22 RX ADMIN — ROFLUMILAST 500 MCG: 500 TABLET ORAL at 09:44

## 2024-09-22 RX ADMIN — SUCRALFATE 1 G: 1 TABLET ORAL at 20:59

## 2024-09-22 RX ADMIN — IPRATROPIUM BROMIDE AND ALBUTEROL SULFATE 1 DOSE: 2.5; .5 SOLUTION RESPIRATORY (INHALATION) at 23:22

## 2024-09-22 RX ADMIN — SUCRALFATE 1 G: 1 TABLET ORAL at 09:26

## 2024-09-22 ASSESSMENT — PAIN DESCRIPTION - DESCRIPTORS: DESCRIPTORS: ACHING

## 2024-09-22 ASSESSMENT — PAIN SCALES - GENERAL
PAINLEVEL_OUTOF10: 0
PAINLEVEL_OUTOF10: 6
PAINLEVEL_OUTOF10: 0
PAINLEVEL_OUTOF10: 0

## 2024-09-22 ASSESSMENT — PAIN SCALES - WONG BAKER
WONGBAKER_NUMERICALRESPONSE: NO HURT
WONGBAKER_NUMERICALRESPONSE: NO HURT

## 2024-09-22 ASSESSMENT — PAIN - FUNCTIONAL ASSESSMENT: PAIN_FUNCTIONAL_ASSESSMENT: ACTIVITIES ARE NOT PREVENTED

## 2024-09-22 ASSESSMENT — PAIN DESCRIPTION - ORIENTATION: ORIENTATION: RIGHT;LEFT

## 2024-09-22 ASSESSMENT — PAIN DESCRIPTION - LOCATION: LOCATION: HEAD

## 2024-09-23 ENCOUNTER — ANESTHESIA (OUTPATIENT)
Dept: ENDOSCOPY | Age: 84
DRG: 683 | End: 2024-09-23
Payer: MEDICARE

## 2024-09-23 ENCOUNTER — ANESTHESIA EVENT (OUTPATIENT)
Dept: ENDOSCOPY | Age: 84
DRG: 683 | End: 2024-09-23
Payer: MEDICARE

## 2024-09-23 VITALS
HEART RATE: 84 BPM | OXYGEN SATURATION: 98 % | DIASTOLIC BLOOD PRESSURE: 72 MMHG | WEIGHT: 99.21 LBS | RESPIRATION RATE: 18 BRPM | SYSTOLIC BLOOD PRESSURE: 124 MMHG | TEMPERATURE: 98.3 F | BODY MASS INDEX: 16.94 KG/M2 | HEIGHT: 64 IN

## 2024-09-23 LAB
ALBUMIN SERPL-MCNC: 3.5 G/DL (ref 3.4–5)
ANION GAP SERPL CALCULATED.3IONS-SCNC: 5 MMOL/L (ref 3–16)
BUN SERPL-MCNC: 8 MG/DL (ref 7–20)
CALCIUM SERPL-MCNC: 10.1 MG/DL (ref 8.3–10.6)
CHLORIDE SERPL-SCNC: 103 MMOL/L (ref 99–110)
CO2 SERPL-SCNC: 34 MMOL/L (ref 21–32)
CREAT SERPL-MCNC: 1.1 MG/DL (ref 0.6–1.2)
DEPRECATED RDW RBC AUTO: 13.5 % (ref 12.4–15.4)
GFR SERPLBLD CREATININE-BSD FMLA CKD-EPI: 50 ML/MIN/{1.73_M2}
GLUCOSE SERPL-MCNC: 101 MG/DL (ref 70–99)
HCT VFR BLD AUTO: 34.9 % (ref 36–48)
HGB BLD-MCNC: 11.2 G/DL (ref 12–16)
MCH RBC QN AUTO: 30.7 PG (ref 26–34)
MCHC RBC AUTO-ENTMCNC: 32.2 G/DL (ref 31–36)
MCV RBC AUTO: 95.2 FL (ref 80–100)
PHOSPHATE SERPL-MCNC: 2.6 MG/DL (ref 2.5–4.9)
PLATELET # BLD AUTO: 269 K/UL (ref 135–450)
PMV BLD AUTO: 8.3 FL (ref 5–10.5)
POTASSIUM SERPL-SCNC: 4.7 MMOL/L (ref 3.5–5.1)
RBC # BLD AUTO: 3.66 M/UL (ref 4–5.2)
SODIUM SERPL-SCNC: 142 MMOL/L (ref 136–145)
WBC # BLD AUTO: 7.2 K/UL (ref 4–11)

## 2024-09-23 PROCEDURE — 94640 AIRWAY INHALATION TREATMENT: CPT

## 2024-09-23 PROCEDURE — 7100000010 HC PHASE II RECOVERY - FIRST 15 MIN: Performed by: INTERNAL MEDICINE

## 2024-09-23 PROCEDURE — 6370000000 HC RX 637 (ALT 250 FOR IP): Performed by: INTERNAL MEDICINE

## 2024-09-23 PROCEDURE — 6360000002 HC RX W HCPCS: Performed by: INTERNAL MEDICINE

## 2024-09-23 PROCEDURE — 97530 THERAPEUTIC ACTIVITIES: CPT

## 2024-09-23 PROCEDURE — 3700000001 HC ADD 15 MINUTES (ANESTHESIA): Performed by: INTERNAL MEDICINE

## 2024-09-23 PROCEDURE — 2500000003 HC RX 250 WO HCPCS: Performed by: NURSE ANESTHETIST, CERTIFIED REGISTERED

## 2024-09-23 PROCEDURE — 80069 RENAL FUNCTION PANEL: CPT

## 2024-09-23 PROCEDURE — 2580000003 HC RX 258: Performed by: INTERNAL MEDICINE

## 2024-09-23 PROCEDURE — 2709999900 HC NON-CHARGEABLE SUPPLY: Performed by: INTERNAL MEDICINE

## 2024-09-23 PROCEDURE — 7100000011 HC PHASE II RECOVERY - ADDTL 15 MIN: Performed by: INTERNAL MEDICINE

## 2024-09-23 PROCEDURE — 3609017700 HC EGD DILATION GASTRIC/DUODENAL STRICTURE: Performed by: INTERNAL MEDICINE

## 2024-09-23 PROCEDURE — 36415 COLL VENOUS BLD VENIPUNCTURE: CPT

## 2024-09-23 PROCEDURE — 6360000002 HC RX W HCPCS: Performed by: NURSE ANESTHETIST, CERTIFIED REGISTERED

## 2024-09-23 PROCEDURE — 2700000000 HC OXYGEN THERAPY PER DAY

## 2024-09-23 PROCEDURE — 94761 N-INVAS EAR/PLS OXIMETRY MLT: CPT

## 2024-09-23 PROCEDURE — 3700000000 HC ANESTHESIA ATTENDED CARE: Performed by: INTERNAL MEDICINE

## 2024-09-23 PROCEDURE — 85027 COMPLETE CBC AUTOMATED: CPT

## 2024-09-23 PROCEDURE — 6370000000 HC RX 637 (ALT 250 FOR IP): Performed by: STUDENT IN AN ORGANIZED HEALTH CARE EDUCATION/TRAINING PROGRAM

## 2024-09-23 PROCEDURE — 0D758ZZ DILATION OF ESOPHAGUS, VIA NATURAL OR ARTIFICIAL OPENING ENDOSCOPIC: ICD-10-PCS | Performed by: INTERNAL MEDICINE

## 2024-09-23 PROCEDURE — 3609017100 HC EGD: Performed by: INTERNAL MEDICINE

## 2024-09-23 RX ORDER — SODIUM CHLORIDE 0.9 % (FLUSH) 0.9 %
5-40 SYRINGE (ML) INJECTION EVERY 12 HOURS SCHEDULED
Status: CANCELLED | OUTPATIENT
Start: 2024-09-23

## 2024-09-23 RX ORDER — LIDOCAINE HYDROCHLORIDE 20 MG/ML
INJECTION, SOLUTION EPIDURAL; INFILTRATION; INTRACAUDAL; PERINEURAL
Status: DISCONTINUED | OUTPATIENT
Start: 2024-09-23 | End: 2024-09-23 | Stop reason: SDUPTHER

## 2024-09-23 RX ORDER — SODIUM CHLORIDE 0.9 % (FLUSH) 0.9 %
5-40 SYRINGE (ML) INJECTION PRN
Status: CANCELLED | OUTPATIENT
Start: 2024-09-23

## 2024-09-23 RX ORDER — PROPOFOL 10 MG/ML
INJECTION, EMULSION INTRAVENOUS
Status: DISCONTINUED | OUTPATIENT
Start: 2024-09-23 | End: 2024-09-23 | Stop reason: SDUPTHER

## 2024-09-23 RX ORDER — PHENYLEPHRINE HCL IN 0.9% NACL 1 MG/10 ML
SYRINGE (ML) INTRAVENOUS
Status: DISCONTINUED | OUTPATIENT
Start: 2024-09-23 | End: 2024-09-23 | Stop reason: SDUPTHER

## 2024-09-23 RX ORDER — SODIUM CHLORIDE 9 MG/ML
INJECTION, SOLUTION INTRAVENOUS PRN
Status: CANCELLED | OUTPATIENT
Start: 2024-09-23

## 2024-09-23 RX ORDER — SODIUM CHLORIDE, SODIUM LACTATE, POTASSIUM CHLORIDE, CALCIUM CHLORIDE 600; 310; 30; 20 MG/100ML; MG/100ML; MG/100ML; MG/100ML
INJECTION, SOLUTION INTRAVENOUS CONTINUOUS
Status: CANCELLED | OUTPATIENT
Start: 2024-09-23

## 2024-09-23 RX ORDER — LIDOCAINE HYDROCHLORIDE 10 MG/ML
0.3 INJECTION, SOLUTION INFILTRATION; PERINEURAL
Status: CANCELLED | OUTPATIENT
Start: 2024-09-23 | End: 2024-09-24

## 2024-09-23 RX ADMIN — BUSPIRONE HYDROCHLORIDE 5 MG: 5 TABLET ORAL at 08:16

## 2024-09-23 RX ADMIN — BUSPIRONE HYDROCHLORIDE 5 MG: 5 TABLET ORAL at 15:47

## 2024-09-23 RX ADMIN — Medication 400 MG: at 08:16

## 2024-09-23 RX ADMIN — SODIUM CHLORIDE, PRESERVATIVE FREE 10 ML: 5 INJECTION INTRAVENOUS at 08:16

## 2024-09-23 RX ADMIN — ALBUTEROL SULFATE 2.5 MG: 2.5 SOLUTION RESPIRATORY (INHALATION) at 15:33

## 2024-09-23 RX ADMIN — ALBUTEROL SULFATE 2.5 MG: 2.5 SOLUTION RESPIRATORY (INHALATION) at 11:54

## 2024-09-23 RX ADMIN — PANTOPRAZOLE SODIUM 40 MG: 40 TABLET, DELAYED RELEASE ORAL at 08:16

## 2024-09-23 RX ADMIN — ESCITALOPRAM OXALATE 10 MG: 10 TABLET ORAL at 08:16

## 2024-09-23 RX ADMIN — LIDOCAINE HYDROCHLORIDE 100 MG: 20 INJECTION, SOLUTION EPIDURAL; INFILTRATION; INTRACAUDAL at 13:38

## 2024-09-23 RX ADMIN — PHENOL 1 SPRAY: 1.5 LIQUID ORAL at 15:47

## 2024-09-23 RX ADMIN — SUCRALFATE 1 G: 1 TABLET ORAL at 08:16

## 2024-09-23 RX ADMIN — IPRATROPIUM BROMIDE AND ALBUTEROL SULFATE 1 DOSE: 2.5; .5 SOLUTION RESPIRATORY (INHALATION) at 03:53

## 2024-09-23 RX ADMIN — ROFLUMILAST 500 MCG: 500 TABLET ORAL at 08:15

## 2024-09-23 RX ADMIN — PROPOFOL 50 MG: 10 INJECTION, EMULSION INTRAVENOUS at 13:38

## 2024-09-23 RX ADMIN — CARBAMIDE PEROXIDE 6.5% 10 DROP: 6.5 LIQUID AURICULAR (OTIC) at 08:17

## 2024-09-23 RX ADMIN — GUAIFENESIN 600 MG: 600 TABLET ORAL at 08:17

## 2024-09-23 RX ADMIN — ALBUTEROL SULFATE 2.5 MG: 2.5 SOLUTION RESPIRATORY (INHALATION) at 08:39

## 2024-09-23 RX ADMIN — AMLODIPINE BESYLATE 5 MG: 5 TABLET ORAL at 08:16

## 2024-09-23 RX ADMIN — Medication 200 MCG: at 13:44

## 2024-09-23 RX ADMIN — METOPROLOL TARTRATE 25 MG: 25 TABLET, FILM COATED ORAL at 08:15

## 2024-09-23 RX ADMIN — Medication 1 CAPSULE: at 08:15

## 2024-09-23 ASSESSMENT — PAIN DESCRIPTION - LOCATION: LOCATION: THROAT

## 2024-09-23 ASSESSMENT — PAIN SCALES - GENERAL
PAINLEVEL_OUTOF10: 0
PAINLEVEL_OUTOF10: 9

## 2024-09-23 ASSESSMENT — PAIN DESCRIPTION - DESCRIPTORS: DESCRIPTORS: SORE

## 2024-09-23 ASSESSMENT — ENCOUNTER SYMPTOMS: SHORTNESS OF BREATH: 1

## 2024-09-23 ASSESSMENT — PAIN - FUNCTIONAL ASSESSMENT: PAIN_FUNCTIONAL_ASSESSMENT: 0-10

## 2024-10-11 ENCOUNTER — HOSPITAL ENCOUNTER (INPATIENT)
Age: 84
LOS: 4 days | Discharge: HOME HEALTH | DRG: 683 | End: 2024-10-15
Attending: STUDENT IN AN ORGANIZED HEALTH CARE EDUCATION/TRAINING PROGRAM | Admitting: FAMILY MEDICINE
Payer: MEDICARE

## 2024-10-11 ENCOUNTER — APPOINTMENT (OUTPATIENT)
Dept: GENERAL RADIOLOGY | Age: 84
DRG: 683 | End: 2024-10-11
Payer: MEDICARE

## 2024-10-11 DIAGNOSIS — J44.1 COPD EXACERBATION (HCC): Primary | ICD-10-CM

## 2024-10-11 DIAGNOSIS — R06.00 DYSPNEA, UNSPECIFIED TYPE: ICD-10-CM

## 2024-10-11 DIAGNOSIS — R65.10 SIRS (SYSTEMIC INFLAMMATORY RESPONSE SYNDROME) (HCC): ICD-10-CM

## 2024-10-11 DIAGNOSIS — N17.9 AKI (ACUTE KIDNEY INJURY) (HCC): ICD-10-CM

## 2024-10-11 LAB
ALBUMIN SERPL-MCNC: 3.9 G/DL (ref 3.4–5)
ALBUMIN/GLOB SERPL: 1.1 {RATIO} (ref 1.1–2.2)
ALP SERPL-CCNC: 91 U/L (ref 40–129)
ALT SERPL-CCNC: 9 U/L (ref 10–40)
ANION GAP SERPL CALCULATED.3IONS-SCNC: 10 MMOL/L (ref 3–16)
AST SERPL-CCNC: 13 U/L (ref 15–37)
BASOPHILS # BLD: 0 K/UL (ref 0–0.2)
BASOPHILS NFR BLD: 0 %
BILIRUB SERPL-MCNC: 0.3 MG/DL (ref 0–1)
BUN SERPL-MCNC: 34 MG/DL (ref 7–20)
CALCIUM SERPL-MCNC: 9.9 MG/DL (ref 8.3–10.6)
CHLORIDE SERPL-SCNC: 94 MMOL/L (ref 99–110)
CO2 SERPL-SCNC: 34 MMOL/L (ref 21–32)
CREAT SERPL-MCNC: 2 MG/DL (ref 0.6–1.2)
DEPRECATED RDW RBC AUTO: 13.7 % (ref 12.4–15.4)
EOSINOPHIL # BLD: 0.1 K/UL (ref 0–0.6)
EOSINOPHIL NFR BLD: 0.4 %
GFR SERPLBLD CREATININE-BSD FMLA CKD-EPI: 24 ML/MIN/{1.73_M2}
GLUCOSE SERPL-MCNC: 127 MG/DL (ref 70–99)
HCT VFR BLD AUTO: 36.5 % (ref 36–48)
HGB BLD-MCNC: 11.6 G/DL (ref 12–16)
LACTATE BLDV-SCNC: 1.8 MMOL/L (ref 0.4–1.9)
LYMPHOCYTES # BLD: 1 K/UL (ref 1–5.1)
LYMPHOCYTES NFR BLD: 7.9 %
MCH RBC QN AUTO: 29.7 PG (ref 26–34)
MCHC RBC AUTO-ENTMCNC: 31.7 G/DL (ref 31–36)
MCV RBC AUTO: 93.8 FL (ref 80–100)
MONOCYTES # BLD: 0.3 K/UL (ref 0–1.3)
MONOCYTES NFR BLD: 2.6 %
NEUTROPHILS # BLD: 11.4 K/UL (ref 1.7–7.7)
NEUTROPHILS NFR BLD: 89.1 %
NT-PROBNP SERPL-MCNC: 712 PG/ML (ref 0–449)
PLATELET # BLD AUTO: 299 K/UL (ref 135–450)
PMV BLD AUTO: 7.4 FL (ref 5–10.5)
POTASSIUM SERPL-SCNC: 4.7 MMOL/L (ref 3.5–5.1)
PROT SERPL-MCNC: 7.3 G/DL (ref 6.4–8.2)
RBC # BLD AUTO: 3.89 M/UL (ref 4–5.2)
SODIUM SERPL-SCNC: 138 MMOL/L (ref 136–145)
TROPONIN, HIGH SENSITIVITY: 36 NG/L (ref 0–14)
TROPONIN, HIGH SENSITIVITY: 39 NG/L (ref 0–14)
WBC # BLD AUTO: 12.8 K/UL (ref 4–11)

## 2024-10-11 PROCEDURE — 99285 EMERGENCY DEPT VISIT HI MDM: CPT

## 2024-10-11 PROCEDURE — 2580000003 HC RX 258

## 2024-10-11 PROCEDURE — 84484 ASSAY OF TROPONIN QUANT: CPT

## 2024-10-11 PROCEDURE — 1200000000 HC SEMI PRIVATE

## 2024-10-11 PROCEDURE — 81001 URINALYSIS AUTO W/SCOPE: CPT

## 2024-10-11 PROCEDURE — 83605 ASSAY OF LACTIC ACID: CPT

## 2024-10-11 PROCEDURE — 85025 COMPLETE CBC W/AUTO DIFF WBC: CPT

## 2024-10-11 PROCEDURE — 83880 ASSAY OF NATRIURETIC PEPTIDE: CPT

## 2024-10-11 PROCEDURE — 96375 TX/PRO/DX INJ NEW DRUG ADDON: CPT

## 2024-10-11 PROCEDURE — 93005 ELECTROCARDIOGRAM TRACING: CPT | Performed by: STUDENT IN AN ORGANIZED HEALTH CARE EDUCATION/TRAINING PROGRAM

## 2024-10-11 PROCEDURE — 80053 COMPREHEN METABOLIC PANEL: CPT

## 2024-10-11 PROCEDURE — 96374 THER/PROPH/DIAG INJ IV PUSH: CPT

## 2024-10-11 PROCEDURE — 6360000002 HC RX W HCPCS: Performed by: STUDENT IN AN ORGANIZED HEALTH CARE EDUCATION/TRAINING PROGRAM

## 2024-10-11 PROCEDURE — 36415 COLL VENOUS BLD VENIPUNCTURE: CPT

## 2024-10-11 PROCEDURE — 2580000003 HC RX 258: Performed by: STUDENT IN AN ORGANIZED HEALTH CARE EDUCATION/TRAINING PROGRAM

## 2024-10-11 PROCEDURE — 71045 X-RAY EXAM CHEST 1 VIEW: CPT

## 2024-10-11 RX ORDER — ACETAMINOPHEN 650 MG/1
650 SUPPOSITORY RECTAL EVERY 6 HOURS PRN
Status: DISCONTINUED | OUTPATIENT
Start: 2024-10-11 | End: 2024-10-15 | Stop reason: HOSPADM

## 2024-10-11 RX ORDER — PREDNISONE 20 MG/1
20 TABLET ORAL DAILY
Status: DISCONTINUED | OUTPATIENT
Start: 2024-10-12 | End: 2024-10-14

## 2024-10-11 RX ORDER — ATORVASTATIN CALCIUM 40 MG/1
40 TABLET, FILM COATED ORAL NIGHTLY
Status: DISCONTINUED | OUTPATIENT
Start: 2024-10-11 | End: 2024-10-15 | Stop reason: HOSPADM

## 2024-10-11 RX ORDER — AMLODIPINE BESYLATE 5 MG/1
5 TABLET ORAL DAILY
Status: DISCONTINUED | OUTPATIENT
Start: 2024-10-12 | End: 2024-10-15 | Stop reason: HOSPADM

## 2024-10-11 RX ORDER — PANTOPRAZOLE SODIUM 40 MG/1
40 TABLET, DELAYED RELEASE ORAL 2 TIMES DAILY
Status: DISCONTINUED | OUTPATIENT
Start: 2024-10-11 | End: 2024-10-15 | Stop reason: HOSPADM

## 2024-10-11 RX ORDER — ALBUTEROL SULFATE 0.83 MG/ML
2.5 SOLUTION RESPIRATORY (INHALATION) EVERY 4 HOURS PRN
Status: DISCONTINUED | OUTPATIENT
Start: 2024-10-11 | End: 2024-10-15 | Stop reason: HOSPADM

## 2024-10-11 RX ORDER — SODIUM CHLORIDE 0.9 % (FLUSH) 0.9 %
5-40 SYRINGE (ML) INJECTION EVERY 12 HOURS SCHEDULED
Status: DISCONTINUED | OUTPATIENT
Start: 2024-10-11 | End: 2024-10-15 | Stop reason: HOSPADM

## 2024-10-11 RX ORDER — ALBUTEROL SULFATE 0.83 MG/ML
2.5 SOLUTION RESPIRATORY (INHALATION) EVERY 4 HOURS PRN
Status: DISCONTINUED | OUTPATIENT
Start: 2024-10-11 | End: 2024-10-12

## 2024-10-11 RX ORDER — TORSEMIDE 20 MG/1
20 TABLET ORAL DAILY
Status: DISCONTINUED | OUTPATIENT
Start: 2024-10-12 | End: 2024-10-15 | Stop reason: HOSPADM

## 2024-10-11 RX ORDER — 0.9 % SODIUM CHLORIDE 0.9 %
500 INTRAVENOUS SOLUTION INTRAVENOUS ONCE
Status: COMPLETED | OUTPATIENT
Start: 2024-10-11 | End: 2024-10-12

## 2024-10-11 RX ORDER — ACETAMINOPHEN 325 MG/1
650 TABLET ORAL EVERY 6 HOURS PRN
Status: DISCONTINUED | OUTPATIENT
Start: 2024-10-11 | End: 2024-10-15 | Stop reason: HOSPADM

## 2024-10-11 RX ORDER — POLYETHYLENE GLYCOL 3350 17 G/17G
17 POWDER, FOR SOLUTION ORAL DAILY PRN
Status: DISCONTINUED | OUTPATIENT
Start: 2024-10-11 | End: 2024-10-15 | Stop reason: HOSPADM

## 2024-10-11 RX ORDER — ESCITALOPRAM OXALATE 10 MG/1
10 TABLET ORAL NIGHTLY
Status: DISCONTINUED | OUTPATIENT
Start: 2024-10-11 | End: 2024-10-15 | Stop reason: HOSPADM

## 2024-10-11 RX ORDER — SODIUM CHLORIDE 0.9 % (FLUSH) 0.9 %
5-40 SYRINGE (ML) INJECTION PRN
Status: DISCONTINUED | OUTPATIENT
Start: 2024-10-11 | End: 2024-10-15 | Stop reason: HOSPADM

## 2024-10-11 RX ORDER — SODIUM CHLORIDE 9 MG/ML
INJECTION, SOLUTION INTRAVENOUS CONTINUOUS
Status: ACTIVE | OUTPATIENT
Start: 2024-10-11 | End: 2024-10-12

## 2024-10-11 RX ORDER — ACETAMINOPHEN 325 MG/1
650 TABLET ORAL EVERY 6 HOURS PRN
Status: DISCONTINUED | OUTPATIENT
Start: 2024-10-11 | End: 2024-10-14 | Stop reason: SDUPTHER

## 2024-10-11 RX ORDER — ONDANSETRON 4 MG/1
4 TABLET, ORALLY DISINTEGRATING ORAL EVERY 8 HOURS PRN
Status: DISCONTINUED | OUTPATIENT
Start: 2024-10-11 | End: 2024-10-15 | Stop reason: HOSPADM

## 2024-10-11 RX ORDER — ROFLUMILAST 500 UG/1
500 TABLET ORAL DAILY
Status: DISCONTINUED | OUTPATIENT
Start: 2024-10-12 | End: 2024-10-15 | Stop reason: HOSPADM

## 2024-10-11 RX ORDER — SODIUM CHLORIDE 9 MG/ML
INJECTION, SOLUTION INTRAVENOUS PRN
Status: DISCONTINUED | OUTPATIENT
Start: 2024-10-11 | End: 2024-10-15 | Stop reason: HOSPADM

## 2024-10-11 RX ORDER — LACTOBACILLUS RHAMNOSUS GG 10B CELL
1 CAPSULE ORAL
Status: DISCONTINUED | OUTPATIENT
Start: 2024-10-12 | End: 2024-10-15 | Stop reason: HOSPADM

## 2024-10-11 RX ORDER — BUSPIRONE HYDROCHLORIDE 5 MG/1
5 TABLET ORAL EVERY 8 HOURS PRN
Status: DISCONTINUED | OUTPATIENT
Start: 2024-10-11 | End: 2024-10-15 | Stop reason: HOSPADM

## 2024-10-11 RX ORDER — ONDANSETRON 2 MG/ML
4 INJECTION INTRAMUSCULAR; INTRAVENOUS EVERY 6 HOURS PRN
Status: DISCONTINUED | OUTPATIENT
Start: 2024-10-11 | End: 2024-10-15 | Stop reason: HOSPADM

## 2024-10-11 RX ORDER — METOPROLOL TARTRATE 25 MG/1
25 TABLET, FILM COATED ORAL 2 TIMES DAILY
Status: DISCONTINUED | OUTPATIENT
Start: 2024-10-11 | End: 2024-10-15 | Stop reason: HOSPADM

## 2024-10-11 RX ADMIN — AZITHROMYCIN MONOHYDRATE 500 MG: 500 INJECTION, POWDER, LYOPHILIZED, FOR SOLUTION INTRAVENOUS at 23:17

## 2024-10-11 RX ADMIN — CEFTRIAXONE SODIUM 1000 MG: 1 INJECTION, POWDER, FOR SOLUTION INTRAMUSCULAR; INTRAVENOUS at 21:53

## 2024-10-11 RX ADMIN — WATER 125 MG: 1 INJECTION INTRAMUSCULAR; INTRAVENOUS; SUBCUTANEOUS at 21:46

## 2024-10-11 RX ADMIN — SODIUM CHLORIDE 500 ML: 9 INJECTION, SOLUTION INTRAVENOUS at 21:48

## 2024-10-11 ASSESSMENT — LIFESTYLE VARIABLES: HOW OFTEN DO YOU HAVE A DRINK CONTAINING ALCOHOL: NEVER

## 2024-10-11 NOTE — ED TRIAGE NOTES
Pt arrives via EMS for SOB and Left sided pain x2 days. Pt is on O2 @ 4L at baseline. She lives with her son in their home. She is A&Ox4  and ambulatory at home.   Hx of AFIB. She reports she had a fall last week and fell onto the left side, she correlates her fall with her L sided pain while discussing with the medical resident.    When EMS picked pt up, pts cannula was cut and EMS believes pt was not receiving intended oxygen for a couple of days.

## 2024-10-12 LAB
ANION GAP SERPL CALCULATED.3IONS-SCNC: 8 MMOL/L (ref 3–16)
BASOPHILS # BLD: 0 K/UL (ref 0–0.2)
BASOPHILS NFR BLD: 0.3 %
BILIRUB UR QL STRIP.AUTO: NEGATIVE
BUN SERPL-MCNC: 36 MG/DL (ref 7–20)
CALCIUM SERPL-MCNC: 8.7 MG/DL (ref 8.3–10.6)
CHLORIDE SERPL-SCNC: 101 MMOL/L (ref 99–110)
CLARITY UR: CLEAR
CO2 SERPL-SCNC: 32 MMOL/L (ref 21–32)
COLOR UR: YELLOW
CREAT SERPL-MCNC: 1.6 MG/DL (ref 0.6–1.2)
DEPRECATED RDW RBC AUTO: 13.9 % (ref 12.4–15.4)
EKG ATRIAL RATE: 97 BPM
EKG DIAGNOSIS: NORMAL
EKG P AXIS: 85 DEGREES
EKG P-R INTERVAL: 156 MS
EKG Q-T INTERVAL: 352 MS
EKG QRS DURATION: 82 MS
EKG QTC CALCULATION (BAZETT): 447 MS
EKG R AXIS: -66 DEGREES
EKG T AXIS: 85 DEGREES
EKG VENTRICULAR RATE: 97 BPM
EOSINOPHIL # BLD: 0 K/UL (ref 0–0.6)
EOSINOPHIL NFR BLD: 0 %
EPI CELLS #/AREA URNS HPF: NORMAL /HPF (ref 0–5)
GFR SERPLBLD CREATININE-BSD FMLA CKD-EPI: 32 ML/MIN/{1.73_M2}
GLUCOSE SERPL-MCNC: 155 MG/DL (ref 70–99)
GLUCOSE UR STRIP.AUTO-MCNC: NEGATIVE MG/DL
HCT VFR BLD AUTO: 35.1 % (ref 36–48)
HGB BLD-MCNC: 11 G/DL (ref 12–16)
HGB UR QL STRIP.AUTO: ABNORMAL
KETONES UR STRIP.AUTO-MCNC: NEGATIVE MG/DL
LACTATE BLDV-SCNC: 3.4 MMOL/L (ref 0.4–1.9)
LEUKOCYTE ESTERASE UR QL STRIP.AUTO: NEGATIVE
LYMPHOCYTES # BLD: 0.7 K/UL (ref 1–5.1)
LYMPHOCYTES NFR BLD: 6.8 %
MCH RBC QN AUTO: 30.3 PG (ref 26–34)
MCHC RBC AUTO-ENTMCNC: 31.4 G/DL (ref 31–36)
MCV RBC AUTO: 96.7 FL (ref 80–100)
MONOCYTES # BLD: 0 K/UL (ref 0–1.3)
MONOCYTES NFR BLD: 0.3 %
NEUTROPHILS # BLD: 9.9 K/UL (ref 1.7–7.7)
NEUTROPHILS NFR BLD: 92.6 %
NITRITE UR QL STRIP.AUTO: NEGATIVE
PH UR STRIP.AUTO: 6.5 [PH] (ref 5–8)
PLATELET # BLD AUTO: 254 K/UL (ref 135–450)
PMV BLD AUTO: 7.8 FL (ref 5–10.5)
POTASSIUM SERPL-SCNC: 5 MMOL/L (ref 3.5–5.1)
POTASSIUM SERPL-SCNC: 6.3 MMOL/L (ref 3.5–5.1)
PROT UR STRIP.AUTO-MCNC: NEGATIVE MG/DL
RBC # BLD AUTO: 3.63 M/UL (ref 4–5.2)
RBC #/AREA URNS HPF: NORMAL /HPF (ref 0–4)
SODIUM SERPL-SCNC: 141 MMOL/L (ref 136–145)
SP GR UR STRIP.AUTO: 1.01 (ref 1–1.03)
TROPONIN, HIGH SENSITIVITY: 36 NG/L (ref 0–14)
UA COMPLETE W REFLEX CULTURE PNL UR: ABNORMAL
UA DIPSTICK W REFLEX MICRO PNL UR: YES
URN SPEC COLLECT METH UR: ABNORMAL
UROBILINOGEN UR STRIP-ACNC: 0.2 E.U./DL
WBC # BLD AUTO: 10.7 K/UL (ref 4–11)
WBC #/AREA URNS HPF: NORMAL /HPF (ref 0–5)

## 2024-10-12 PROCEDURE — 6360000002 HC RX W HCPCS: Performed by: INTERNAL MEDICINE

## 2024-10-12 PROCEDURE — 1200000000 HC SEMI PRIVATE

## 2024-10-12 PROCEDURE — 36415 COLL VENOUS BLD VENIPUNCTURE: CPT

## 2024-10-12 PROCEDURE — 84132 ASSAY OF SERUM POTASSIUM: CPT

## 2024-10-12 PROCEDURE — 93010 ELECTROCARDIOGRAM REPORT: CPT | Performed by: INTERNAL MEDICINE

## 2024-10-12 PROCEDURE — 2580000003 HC RX 258: Performed by: NURSE PRACTITIONER

## 2024-10-12 PROCEDURE — 85025 COMPLETE CBC W/AUTO DIFF WBC: CPT

## 2024-10-12 PROCEDURE — 80048 BASIC METABOLIC PNL TOTAL CA: CPT

## 2024-10-12 PROCEDURE — 5A0935A ASSISTANCE WITH RESPIRATORY VENTILATION, LESS THAN 24 CONSECUTIVE HOURS, HIGH NASAL FLOW/VELOCITY: ICD-10-PCS | Performed by: STUDENT IN AN ORGANIZED HEALTH CARE EDUCATION/TRAINING PROGRAM

## 2024-10-12 PROCEDURE — 6360000002 HC RX W HCPCS: Performed by: NURSE PRACTITIONER

## 2024-10-12 PROCEDURE — 94761 N-INVAS EAR/PLS OXIMETRY MLT: CPT

## 2024-10-12 PROCEDURE — 2700000000 HC OXYGEN THERAPY PER DAY

## 2024-10-12 PROCEDURE — 94640 AIRWAY INHALATION TREATMENT: CPT

## 2024-10-12 PROCEDURE — 6370000000 HC RX 637 (ALT 250 FOR IP): Performed by: NURSE PRACTITIONER

## 2024-10-12 RX ORDER — FLUTICASONE PROPIONATE 50 MCG
1 SPRAY, SUSPENSION (ML) NASAL NIGHTLY PRN
Status: DISCONTINUED | OUTPATIENT
Start: 2024-10-12 | End: 2024-10-14

## 2024-10-12 RX ORDER — BUDESONIDE 0.5 MG/2ML
1 INHALANT ORAL EVERY MORNING
COMMUNITY

## 2024-10-12 RX ORDER — LORAZEPAM 0.5 MG/1
0.5 TABLET ORAL EVERY 6 HOURS PRN
COMMUNITY

## 2024-10-12 RX ORDER — FERROUS SULFATE 325(65) MG
325 TABLET ORAL
COMMUNITY

## 2024-10-12 RX ORDER — ALBUTEROL SULFATE 0.83 MG/ML
2.5 SOLUTION RESPIRATORY (INHALATION)
Status: DISCONTINUED | OUTPATIENT
Start: 2024-10-12 | End: 2024-10-15 | Stop reason: HOSPADM

## 2024-10-12 RX ADMIN — ALBUTEROL SULFATE 2.5 MG: 2.5 SOLUTION RESPIRATORY (INHALATION) at 20:08

## 2024-10-12 RX ADMIN — Medication 1 CAPSULE: at 08:23

## 2024-10-12 RX ADMIN — METOPROLOL TARTRATE 25 MG: 25 TABLET, FILM COATED ORAL at 20:35

## 2024-10-12 RX ADMIN — AMLODIPINE BESYLATE 5 MG: 5 TABLET ORAL at 08:23

## 2024-10-12 RX ADMIN — PREDNISONE 20 MG: 20 TABLET ORAL at 08:23

## 2024-10-12 RX ADMIN — ACETAMINOPHEN 650 MG: 325 TABLET ORAL at 23:31

## 2024-10-12 RX ADMIN — APIXABAN 2.5 MG: 5 TABLET, FILM COATED ORAL at 20:34

## 2024-10-12 RX ADMIN — APIXABAN 2.5 MG: 5 TABLET, FILM COATED ORAL at 08:23

## 2024-10-12 RX ADMIN — CEFTRIAXONE SODIUM 1000 MG: 1 INJECTION, POWDER, FOR SOLUTION INTRAMUSCULAR; INTRAVENOUS at 20:37

## 2024-10-12 RX ADMIN — ROFLUMILAST 500 MCG: 500 TABLET ORAL at 08:22

## 2024-10-12 RX ADMIN — SODIUM CHLORIDE: 9 INJECTION, SOLUTION INTRAVENOUS at 00:19

## 2024-10-12 RX ADMIN — ESCITALOPRAM OXALATE 10 MG: 10 TABLET ORAL at 20:35

## 2024-10-12 RX ADMIN — ATORVASTATIN CALCIUM 40 MG: 40 TABLET, FILM COATED ORAL at 00:20

## 2024-10-12 RX ADMIN — BUSPIRONE HYDROCHLORIDE 5 MG: 5 TABLET ORAL at 00:20

## 2024-10-12 RX ADMIN — ONDANSETRON 4 MG: 2 INJECTION INTRAMUSCULAR; INTRAVENOUS at 06:31

## 2024-10-12 RX ADMIN — PANTOPRAZOLE SODIUM 40 MG: 40 TABLET, DELAYED RELEASE ORAL at 00:20

## 2024-10-12 RX ADMIN — ATORVASTATIN CALCIUM 40 MG: 40 TABLET, FILM COATED ORAL at 20:35

## 2024-10-12 RX ADMIN — PANTOPRAZOLE SODIUM 40 MG: 40 TABLET, DELAYED RELEASE ORAL at 20:35

## 2024-10-12 RX ADMIN — BUSPIRONE HYDROCHLORIDE 5 MG: 5 TABLET ORAL at 15:58

## 2024-10-12 RX ADMIN — SODIUM CHLORIDE, PRESERVATIVE FREE 10 ML: 5 INJECTION INTRAVENOUS at 20:37

## 2024-10-12 RX ADMIN — PANTOPRAZOLE SODIUM 40 MG: 40 TABLET, DELAYED RELEASE ORAL at 08:23

## 2024-10-12 RX ADMIN — ACETAMINOPHEN 650 MG: 325 TABLET ORAL at 15:57

## 2024-10-12 RX ADMIN — ALBUTEROL SULFATE 2.5 MG: 2.5 SOLUTION RESPIRATORY (INHALATION) at 09:01

## 2024-10-12 RX ADMIN — ALBUTEROL SULFATE 2.5 MG: 2.5 SOLUTION RESPIRATORY (INHALATION) at 12:00

## 2024-10-12 RX ADMIN — ONDANSETRON 4 MG: 2 INJECTION INTRAMUSCULAR; INTRAVENOUS at 00:27

## 2024-10-12 RX ADMIN — APIXABAN 2.5 MG: 5 TABLET, FILM COATED ORAL at 00:20

## 2024-10-12 RX ADMIN — AZITHROMYCIN MONOHYDRATE 500 MG: 500 INJECTION, POWDER, LYOPHILIZED, FOR SOLUTION INTRAVENOUS at 22:32

## 2024-10-12 RX ADMIN — TORSEMIDE 20 MG: 20 TABLET ORAL at 08:23

## 2024-10-12 RX ADMIN — ESCITALOPRAM OXALATE 10 MG: 10 TABLET ORAL at 00:20

## 2024-10-12 RX ADMIN — METOPROLOL TARTRATE 25 MG: 25 TABLET, FILM COATED ORAL at 00:20

## 2024-10-12 RX ADMIN — ACETAMINOPHEN 650 MG: 325 TABLET ORAL at 00:20

## 2024-10-12 RX ADMIN — METOPROLOL TARTRATE 25 MG: 25 TABLET, FILM COATED ORAL at 08:22

## 2024-10-12 RX ADMIN — ALBUTEROL SULFATE 2.5 MG: 2.5 SOLUTION RESPIRATORY (INHALATION) at 05:47

## 2024-10-12 RX ADMIN — ALBUTEROL SULFATE 2.5 MG: 2.5 SOLUTION RESPIRATORY (INHALATION) at 16:09

## 2024-10-12 ASSESSMENT — PAIN DESCRIPTION - ORIENTATION
ORIENTATION: LEFT;RIGHT
ORIENTATION: MID
ORIENTATION: MID

## 2024-10-12 ASSESSMENT — PAIN SCALES - WONG BAKER
WONGBAKER_NUMERICALRESPONSE: NO HURT

## 2024-10-12 ASSESSMENT — PAIN SCALES - GENERAL
PAINLEVEL_OUTOF10: 0
PAINLEVEL_OUTOF10: 3
PAINLEVEL_OUTOF10: 3
PAINLEVEL_OUTOF10: 0

## 2024-10-12 ASSESSMENT — PAIN DESCRIPTION - DESCRIPTORS
DESCRIPTORS: ACHING
DESCRIPTORS: ACHING

## 2024-10-12 ASSESSMENT — PAIN DESCRIPTION - LOCATION
LOCATION: HEAD
LOCATION: HEAD
LOCATION: LEG

## 2024-10-12 ASSESSMENT — PAIN DESCRIPTION - PAIN TYPE: TYPE: ACUTE PAIN

## 2024-10-12 ASSESSMENT — PAIN - FUNCTIONAL ASSESSMENT: PAIN_FUNCTIONAL_ASSESSMENT: ACTIVITIES ARE NOT PREVENTED

## 2024-10-12 NOTE — RT PROTOCOL NOTE
Bronchodilator order(s) to two equivalent RT bronchodilator orders with one order with TID Frequency and one order with Frequency of every 4 hours PRN wheezing or increased work of breathing using Per Protocol order mode.       11-13 - enter or revise RT Bronchodilator order(s) to one equivalent RT bronchodilator order with QID Frequency and an Albuterol order with Frequency of every 4 hours PRN wheezing or increased work of breathing using Per Protocol order mode.      Greater than 13 - enter or revise RT Bronchodilator order(s) to one equivalent RT bronchodilator order with every 4 hours Frequency and an Albuterol order with Frequency of every 2 hours PRN wheezing or increased work of breathing using Per Protocol order mode.     RT to enter RT Home Evaluation for COPD & MDI Assessment order using Per Protocol order mode.    Electronically signed by Stevo Little RCP on 10/12/2024 at 10:18 AM

## 2024-10-12 NOTE — H&P
Hospital Medicine History & Physical      Date of Admission: 10/11/2024    Date of Service:  Pt seen/examined on 10/11/2024    [x]Admitted to Inpatient with expected LOS greater than two midnights due to medical therapy.    []Placed in Observation status.    Chief Admission Complaint: Shortness of breath    Presenting Admission History:      84 y.o. female, with past medical history of COPD, hypertension, hyperlipidemia, atrial fibrillation, anxiety/depression, who presented to [facility] with shortness of breath.  History obtained from the patient and review of EMR.  Patient stated over the last couple of days she has been experiencing an increase in shortness of breath.  She stated her shortness of breath is worse on exertion.  Patient reports a history of COPD and does wear 2.5-3 L of supplemental oxygen via nasal cannula at home.  The patient stated she does have some mild left-sided chest pain.  However, she states that she believes it is \"noncardiac in nature\" and due to trying to breathe so hard.  Per EMR, when EMS was called they arrived and found the patient's nasal cannula to be broken.  They were speculating that she may not have been getting oxygen at that time. In the emergency department a chest x-ray was obtained that revealed no acute cardiopulmonary findings.  The patient was given Solu-Medrol.  Upon further evaluation, the patient was found to have an NADJA with a creatinine of 2.0.  However, her baseline creatinine appears to be 1.1-1.3.  She was also found to have an elevated troponin of 36 with a repeat troponin of 39.  The patient denies any chest pain.  This is likely secondary to her NADJA.  The patient was treated for sepsis protocol in the emergency department with 500 mL normal saline as well as azithromycin and ceftriaxone.  Antibiotics were continued due to COPD exacerbation.  However, the patient does not meet sepsis criteria.  She does appear to have a leukocytosis.  However,

## 2024-10-12 NOTE — ED PROVIDER NOTES
Emergency Department Attending Physician Note  Location: [facility]  ED  10/11/2024       Pt Name: Terri Smith  MRN: [id number]  Birthdate 1940    Date of evaluation: 10/11/2024  Provider: Theresa Hays DO  PCP: Jackson Ontiveros MD    Note Started: 7:43 PM EDT 10/11/24    CHIEF COMPLAINT  Chief Complaint   Patient presents with    Shortness of Breath     Pt c/o SOB and Left sided CP x2 days. EMS reports pts nasal cannula was broken and does not believe pt was oxygenated.        HISTORY OF PRESENT ILLNESS:  History obtained by patient. Limitations to history : None.     Terri Smith is a 84 y.o. female with a significant PMHx of asthma, COPD, CAD s/p CABG x 2, history of GI bleed, hypertension, heart failure, A-fib, CKD    Patient presents to the emergency department with shortness of breath.  Patient indicated that she had a fall over a week ago and then fell on her left side which causing her chest left-sided chest pain.  Patient states that his chest pain is sore and noncardiac in nature.  Over the past couple days patient has been worsening shortness of breath.  EMS was called by family member and she was found with her nasal cannula broken and was speculating that she may not have been getting the adequate amount of oxygen.  Patient is COPD on 2 to 3 L at home.    Patient denies any other symptoms of fevers, chills, nausea, vomiting or diarrhea.  Patient also denies any abdominal pain.    Nursing Notes were all reviewed and agreed with or any disagreements were addressed in the HPI.      MEDICAL HISTORY  Past Medical History:   Diagnosis Date    NADJA (acute kidney injury) (HCC)     Asthma     Atrial fibrillation with RVR (HCC)     CAD (coronary artery disease)     CHF (congestive heart failure) ([facility])     unsure    Chronic anxiety     COPD (chronic obstructive pulmonary disease) ([facility])     COPD (chronic obstructive pulmonary disease) ([facility]) 08/19/2023    GERD (gastroesophageal 
shared critical care time provided.    This chart was generated in part by using Dragon Dictation system and may contain errors related to that system including errors in grammar, punctuation, and spelling, as well as words and phrases that may be inappropriate. If there are any questions or concerns please feel free to contact the dictating provider for clarification.     Kathy Henderson MD   [facility]        Kathy Henderson MD  10/11/24 [id number]

## 2024-10-12 NOTE — ED NOTES
Terri Smith is a 84 y.o. female admitted for  Principal Problem:    COPD exacerbation (HCC)  Resolved Problems:    * No resolved hospital problems. *  .   Patient Home via EMS transportation with   Chief Complaint   Patient presents with    Shortness of Breath     Pt c/o SOB and Left sided CP x2 days. EMS reports pts nasal cannula was broken and does not believe pt was oxygenated.    .  Patient is alert and Person, Place, Time, and Situation  Patient's baseline mobility: Baseline Mobility: Independent   Code Status: Prior   Cardiac Rhythm:    O2 Flow Rate (L/min): 3 L/min  Is patient on baseline Oxygen: yes, NC how many Liters3:   Abnormal Assessment Findings: NA    Isolation: None      NIH Score:    C-SSRS: Risk of Suicide: No Risk  Bedside swallow:        Active LDA's:   Peripheral IV 10/11/24 Right Antecubital (Active)     Patient admitted with a bond: no If the bond is chronic was it exchanged:No  Reason for bond: NA  Patient admitted with Central Line:  NA . PICC line placement confirmed: YES OR NO:[id number]}   Reason for Central line: NA  Was central line Inserted from an outside facility: no       Family/Caregiver Present yes Any Concerns: no   Restraints no  Sitter no         Vitals:      Vitals:    10/11/24 2101 10/11/24 2102 10/11/24 2209 10/11/24 2209   BP:  115/71     Pulse: 84 88 81 84   Resp: 26 28 (!) 31 (!) 31   Temp:       SpO2: 100% 100% 100% 100%   Weight:       Height:           Last documented pain score (0-10 scale)    Pain medication administered No.    Pertinent or High Risk Medications/Drips: No.    Pending Blood Product Administration: no    Abnormal labs:   Abnormal Labs Reviewed   CBC WITH AUTO DIFFERENTIAL - Abnormal; Notable for the following components:       Result Value    WBC 12.8 (*)     RBC 3.89 (*)     Hemoglobin 11.6 (*)     Neutrophils Absolute 11.4 (*)     All other components within normal limits   COMPREHENSIVE METABOLIC PANEL W/ REFLEX TO MG FOR LOW K - Abnormal;

## 2024-10-12 NOTE — CONSULTS
[facility] Consult Note           Reason for Consult:  Acute kidney injury, Hyperkalemia  Requesting Physician:  Dr. Templeton    Chief Complaint:  Shortness of breath  History Obtained From:  patient, electronic medical record    History of Present Ilness:    84 year old female with COPD on 2-3 L NC, HTN, Atrial fibrillation who presents with shortness of breath.  We have been asked to assist in further mgmt of her NADJA, Hyperkalemia.    SCr 2.0 on admission, down to 1.6 this AM with fluids, prior 1.1-1.3 range in past.  UA bland.  K as high as 6.3 on admission, better with medical mgmt with fluids, improving renal function.  On treatment for COPD exacerbation with antibiotics, steroids.  BP's on lower end, ~100's/60's.  No chest pain, abdominal pain, n/v/d, fevers, or reduced intake.    Past Medical History:        Diagnosis Date    NADJA (acute kidney injury) (HCC)     Asthma     Atrial fibrillation with RVR (HCC)     CAD (coronary artery disease)     CHF (congestive heart failure) (HCC)     unsure    Chronic anxiety     COPD (chronic obstructive pulmonary disease) (HCC)     COPD (chronic obstructive pulmonary disease) (HCC) 2023    GERD (gastroesophageal reflux disease) 2021    Heart attack (HCC) 2019    History of blood transfusion     Hyperlipidemia     Hypertension     MRSA (methicillin resistant staph aureus) culture positive 2019    + resp cx    OA (osteoarthritis) 2014    Thyroid disease        Past Surgical History:        Procedure Laterality Date    BRONCHOSCOPY  2019    Dr. Wheatley - w/BAL    CARDIAC CATHETERIZATION  2019    Dr. Palomares     SECTION      COLONOSCOPY N/A 2020    COLONOSCOPY DIAGNOSTIC performed by Rowdy Schmitz DO at [facility] ENDOSCOPY    COLONOSCOPY N/A 10/22/2021    COLONOSCOPY POLYPECTOMY SNARE/COLD BIOPSY performed by Celestine Lester MD at [facility] ENDOSCOPY    COLONOSCOPY N/A

## 2024-10-13 LAB
ALBUMIN SERPL-MCNC: 3.4 G/DL (ref 3.4–5)
ANION GAP SERPL CALCULATED.3IONS-SCNC: 4 MMOL/L (ref 3–16)
BUN SERPL-MCNC: 37 MG/DL (ref 7–20)
CALCIUM SERPL-MCNC: 8.8 MG/DL (ref 8.3–10.6)
CHLORIDE SERPL-SCNC: 100 MMOL/L (ref 99–110)
CO2 SERPL-SCNC: 38 MMOL/L (ref 21–32)
CREAT SERPL-MCNC: 1.5 MG/DL (ref 0.6–1.2)
DEPRECATED RDW RBC AUTO: 13.6 % (ref 12.4–15.4)
GFR SERPLBLD CREATININE-BSD FMLA CKD-EPI: 34 ML/MIN/{1.73_M2}
GLUCOSE SERPL-MCNC: 96 MG/DL (ref 70–99)
HCT VFR BLD AUTO: 30 % (ref 36–48)
HGB BLD-MCNC: 9.6 G/DL (ref 12–16)
MCH RBC QN AUTO: 30.1 PG (ref 26–34)
MCHC RBC AUTO-ENTMCNC: 32.1 G/DL (ref 31–36)
MCV RBC AUTO: 94 FL (ref 80–100)
PHOSPHATE SERPL-MCNC: 4 MG/DL (ref 2.5–4.9)
PLATELET # BLD AUTO: 255 K/UL (ref 135–450)
PMV BLD AUTO: 7.6 FL (ref 5–10.5)
POTASSIUM SERPL-SCNC: 4 MMOL/L (ref 3.5–5.1)
RBC # BLD AUTO: 3.19 M/UL (ref 4–5.2)
SODIUM SERPL-SCNC: 142 MMOL/L (ref 136–145)
WBC # BLD AUTO: 10.1 K/UL (ref 4–11)

## 2024-10-13 PROCEDURE — 1200000000 HC SEMI PRIVATE

## 2024-10-13 PROCEDURE — 94761 N-INVAS EAR/PLS OXIMETRY MLT: CPT

## 2024-10-13 PROCEDURE — 6360000002 HC RX W HCPCS: Performed by: NURSE PRACTITIONER

## 2024-10-13 PROCEDURE — 6370000000 HC RX 637 (ALT 250 FOR IP): Performed by: NURSE PRACTITIONER

## 2024-10-13 PROCEDURE — 6360000002 HC RX W HCPCS: Performed by: INTERNAL MEDICINE

## 2024-10-13 PROCEDURE — 5A09357 ASSISTANCE WITH RESPIRATORY VENTILATION, LESS THAN 24 CONSECUTIVE HOURS, CONTINUOUS POSITIVE AIRWAY PRESSURE: ICD-10-PCS | Performed by: STUDENT IN AN ORGANIZED HEALTH CARE EDUCATION/TRAINING PROGRAM

## 2024-10-13 PROCEDURE — 85027 COMPLETE CBC AUTOMATED: CPT

## 2024-10-13 PROCEDURE — 2580000003 HC RX 258: Performed by: NURSE PRACTITIONER

## 2024-10-13 PROCEDURE — 36415 COLL VENOUS BLD VENIPUNCTURE: CPT

## 2024-10-13 PROCEDURE — 2700000000 HC OXYGEN THERAPY PER DAY

## 2024-10-13 PROCEDURE — 94640 AIRWAY INHALATION TREATMENT: CPT

## 2024-10-13 PROCEDURE — 80069 RENAL FUNCTION PANEL: CPT

## 2024-10-13 RX ADMIN — ALBUTEROL SULFATE 2.5 MG: 2.5 SOLUTION RESPIRATORY (INHALATION) at 07:25

## 2024-10-13 RX ADMIN — AZITHROMYCIN MONOHYDRATE 500 MG: 500 INJECTION, POWDER, LYOPHILIZED, FOR SOLUTION INTRAVENOUS at 23:06

## 2024-10-13 RX ADMIN — BUSPIRONE HYDROCHLORIDE 5 MG: 5 TABLET ORAL at 08:00

## 2024-10-13 RX ADMIN — SODIUM CHLORIDE, PRESERVATIVE FREE 10 ML: 5 INJECTION INTRAVENOUS at 08:00

## 2024-10-13 RX ADMIN — METOPROLOL TARTRATE 25 MG: 25 TABLET, FILM COATED ORAL at 08:00

## 2024-10-13 RX ADMIN — ALBUTEROL SULFATE 2.5 MG: 2.5 SOLUTION RESPIRATORY (INHALATION) at 11:34

## 2024-10-13 RX ADMIN — BUSPIRONE HYDROCHLORIDE 5 MG: 5 TABLET ORAL at 00:18

## 2024-10-13 RX ADMIN — PANTOPRAZOLE SODIUM 40 MG: 40 TABLET, DELAYED RELEASE ORAL at 20:27

## 2024-10-13 RX ADMIN — ACETAMINOPHEN 650 MG: 325 TABLET ORAL at 17:19

## 2024-10-13 RX ADMIN — PANTOPRAZOLE SODIUM 40 MG: 40 TABLET, DELAYED RELEASE ORAL at 08:00

## 2024-10-13 RX ADMIN — CEFTRIAXONE SODIUM 1000 MG: 1 INJECTION, POWDER, FOR SOLUTION INTRAMUSCULAR; INTRAVENOUS at 22:18

## 2024-10-13 RX ADMIN — ESCITALOPRAM OXALATE 10 MG: 10 TABLET ORAL at 20:27

## 2024-10-13 RX ADMIN — BUSPIRONE HYDROCHLORIDE 5 MG: 5 TABLET ORAL at 23:50

## 2024-10-13 RX ADMIN — PREDNISONE 20 MG: 20 TABLET ORAL at 08:00

## 2024-10-13 RX ADMIN — BUSPIRONE HYDROCHLORIDE 5 MG: 5 TABLET ORAL at 17:19

## 2024-10-13 RX ADMIN — APIXABAN 2.5 MG: 5 TABLET, FILM COATED ORAL at 20:26

## 2024-10-13 RX ADMIN — ATORVASTATIN CALCIUM 40 MG: 40 TABLET, FILM COATED ORAL at 20:27

## 2024-10-13 RX ADMIN — ALBUTEROL SULFATE 2.5 MG: 2.5 SOLUTION RESPIRATORY (INHALATION) at 15:23

## 2024-10-13 RX ADMIN — SODIUM CHLORIDE, PRESERVATIVE FREE 10 ML: 5 INJECTION INTRAVENOUS at 20:27

## 2024-10-13 RX ADMIN — ROFLUMILAST 500 MCG: 500 TABLET ORAL at 08:10

## 2024-10-13 RX ADMIN — ALBUTEROL SULFATE 2.5 MG: 2.5 SOLUTION RESPIRATORY (INHALATION) at 19:07

## 2024-10-13 RX ADMIN — APIXABAN 2.5 MG: 5 TABLET, FILM COATED ORAL at 08:00

## 2024-10-13 RX ADMIN — ONDANSETRON 4 MG: 2 INJECTION INTRAMUSCULAR; INTRAVENOUS at 00:18

## 2024-10-13 RX ADMIN — Medication 1 CAPSULE: at 08:00

## 2024-10-13 ASSESSMENT — PAIN DESCRIPTION - DESCRIPTORS: DESCRIPTORS: ACHING

## 2024-10-13 ASSESSMENT — PAIN SCALES - GENERAL
PAINLEVEL_OUTOF10: 5
PAINLEVEL_OUTOF10: 0
PAINLEVEL_OUTOF10: 0
PAINLEVEL_OUTOF10: 2

## 2024-10-13 ASSESSMENT — PAIN DESCRIPTION - ORIENTATION: ORIENTATION: LEFT;RIGHT

## 2024-10-13 ASSESSMENT — PAIN DESCRIPTION - LOCATION: LOCATION: LEG

## 2024-10-14 LAB
ALBUMIN SERPL-MCNC: 3.5 G/DL (ref 3.4–5)
ANION GAP SERPL CALCULATED.3IONS-SCNC: 5 MMOL/L (ref 3–16)
BUN SERPL-MCNC: 26 MG/DL (ref 7–20)
CALCIUM SERPL-MCNC: 9.2 MG/DL (ref 8.3–10.6)
CHLORIDE SERPL-SCNC: 104 MMOL/L (ref 99–110)
CO2 SERPL-SCNC: 36 MMOL/L (ref 21–32)
CREAT SERPL-MCNC: 1.2 MG/DL (ref 0.6–1.2)
DEPRECATED RDW RBC AUTO: 13.8 % (ref 12.4–15.4)
GFR SERPLBLD CREATININE-BSD FMLA CKD-EPI: 45 ML/MIN/{1.73_M2}
GLUCOSE SERPL-MCNC: 81 MG/DL (ref 70–99)
HCT VFR BLD AUTO: 34.2 % (ref 36–48)
HGB BLD-MCNC: 11 G/DL (ref 12–16)
MCH RBC QN AUTO: 30.3 PG (ref 26–34)
MCHC RBC AUTO-ENTMCNC: 32.1 G/DL (ref 31–36)
MCV RBC AUTO: 94.5 FL (ref 80–100)
PHOSPHATE SERPL-MCNC: 3.3 MG/DL (ref 2.5–4.9)
PLATELET # BLD AUTO: 257 K/UL (ref 135–450)
PMV BLD AUTO: 7.7 FL (ref 5–10.5)
POTASSIUM SERPL-SCNC: 4.1 MMOL/L (ref 3.5–5.1)
RBC # BLD AUTO: 3.62 M/UL (ref 4–5.2)
SODIUM SERPL-SCNC: 145 MMOL/L (ref 136–145)
WBC # BLD AUTO: 9.7 K/UL (ref 4–11)

## 2024-10-14 PROCEDURE — 6360000002 HC RX W HCPCS: Performed by: NURSE PRACTITIONER

## 2024-10-14 PROCEDURE — 6360000002 HC RX W HCPCS: Performed by: INTERNAL MEDICINE

## 2024-10-14 PROCEDURE — 94640 AIRWAY INHALATION TREATMENT: CPT

## 2024-10-14 PROCEDURE — 6370000000 HC RX 637 (ALT 250 FOR IP): Performed by: NURSE PRACTITIONER

## 2024-10-14 PROCEDURE — 2580000003 HC RX 258: Performed by: NURSE PRACTITIONER

## 2024-10-14 PROCEDURE — 85027 COMPLETE CBC AUTOMATED: CPT

## 2024-10-14 PROCEDURE — 97530 THERAPEUTIC ACTIVITIES: CPT

## 2024-10-14 PROCEDURE — 80069 RENAL FUNCTION PANEL: CPT

## 2024-10-14 PROCEDURE — 1200000000 HC SEMI PRIVATE

## 2024-10-14 PROCEDURE — 97166 OT EVAL MOD COMPLEX 45 MIN: CPT

## 2024-10-14 PROCEDURE — 94761 N-INVAS EAR/PLS OXIMETRY MLT: CPT

## 2024-10-14 PROCEDURE — 36415 COLL VENOUS BLD VENIPUNCTURE: CPT

## 2024-10-14 PROCEDURE — 97535 SELF CARE MNGMENT TRAINING: CPT

## 2024-10-14 PROCEDURE — 2700000000 HC OXYGEN THERAPY PER DAY

## 2024-10-14 RX ORDER — FLUTICASONE PROPIONATE 50 MCG
2 SPRAY, SUSPENSION (ML) NASAL DAILY
Status: DISCONTINUED | OUTPATIENT
Start: 2024-10-14 | End: 2024-10-15 | Stop reason: HOSPADM

## 2024-10-14 RX ADMIN — WATER 40 MG: 1 INJECTION INTRAMUSCULAR; INTRAVENOUS; SUBCUTANEOUS at 23:23

## 2024-10-14 RX ADMIN — APIXABAN 2.5 MG: 5 TABLET, FILM COATED ORAL at 20:19

## 2024-10-14 RX ADMIN — ATORVASTATIN CALCIUM 40 MG: 40 TABLET, FILM COATED ORAL at 20:18

## 2024-10-14 RX ADMIN — WATER 40 MG: 1 INJECTION INTRAMUSCULAR; INTRAVENOUS; SUBCUTANEOUS at 11:14

## 2024-10-14 RX ADMIN — ALBUTEROL SULFATE 2.5 MG: 2.5 SOLUTION RESPIRATORY (INHALATION) at 18:44

## 2024-10-14 RX ADMIN — Medication 1 LOZENGE: at 12:33

## 2024-10-14 RX ADMIN — BUSPIRONE HYDROCHLORIDE 5 MG: 5 TABLET ORAL at 20:18

## 2024-10-14 RX ADMIN — METOPROLOL TARTRATE 25 MG: 25 TABLET, FILM COATED ORAL at 20:18

## 2024-10-14 RX ADMIN — APIXABAN 2.5 MG: 5 TABLET, FILM COATED ORAL at 08:37

## 2024-10-14 RX ADMIN — PREDNISONE 20 MG: 20 TABLET ORAL at 08:37

## 2024-10-14 RX ADMIN — ALBUTEROL SULFATE 2.5 MG: 2.5 SOLUTION RESPIRATORY (INHALATION) at 07:57

## 2024-10-14 RX ADMIN — PANTOPRAZOLE SODIUM 40 MG: 40 TABLET, DELAYED RELEASE ORAL at 20:18

## 2024-10-14 RX ADMIN — ALBUTEROL SULFATE 2.5 MG: 2.5 SOLUTION RESPIRATORY (INHALATION) at 23:16

## 2024-10-14 RX ADMIN — PANTOPRAZOLE SODIUM 40 MG: 40 TABLET, DELAYED RELEASE ORAL at 08:37

## 2024-10-14 RX ADMIN — METOPROLOL TARTRATE 25 MG: 25 TABLET, FILM COATED ORAL at 08:37

## 2024-10-14 RX ADMIN — BUSPIRONE HYDROCHLORIDE 5 MG: 5 TABLET ORAL at 06:44

## 2024-10-14 RX ADMIN — ALBUTEROL SULFATE 2.5 MG: 2.5 SOLUTION RESPIRATORY (INHALATION) at 11:23

## 2024-10-14 RX ADMIN — ESCITALOPRAM OXALATE 10 MG: 10 TABLET ORAL at 20:18

## 2024-10-14 RX ADMIN — ALBUTEROL SULFATE 2.5 MG: 2.5 SOLUTION RESPIRATORY (INHALATION) at 15:29

## 2024-10-14 RX ADMIN — ONDANSETRON 4 MG: 2 INJECTION INTRAMUSCULAR; INTRAVENOUS at 03:37

## 2024-10-14 RX ADMIN — Medication 1 CAPSULE: at 08:37

## 2024-10-14 RX ADMIN — FLUTICASONE PROPIONATE 2 SPRAY: 50 SPRAY, METERED NASAL at 12:28

## 2024-10-14 RX ADMIN — ROFLUMILAST 500 MCG: 500 TABLET ORAL at 08:42

## 2024-10-14 RX ADMIN — ACETAMINOPHEN 650 MG: 325 TABLET ORAL at 08:37

## 2024-10-14 RX ADMIN — SODIUM CHLORIDE, PRESERVATIVE FREE 10 ML: 5 INJECTION INTRAVENOUS at 08:38

## 2024-10-14 RX ADMIN — CEFTRIAXONE SODIUM 1000 MG: 1 INJECTION, POWDER, FOR SOLUTION INTRAMUSCULAR; INTRAVENOUS at 20:18

## 2024-10-14 ASSESSMENT — PAIN SCALES - WONG BAKER

## 2024-10-14 ASSESSMENT — PAIN - FUNCTIONAL ASSESSMENT: PAIN_FUNCTIONAL_ASSESSMENT: ACTIVITIES ARE NOT PREVENTED

## 2024-10-14 ASSESSMENT — PAIN SCALES - GENERAL
PAINLEVEL_OUTOF10: 0
PAINLEVEL_OUTOF10: 3

## 2024-10-14 ASSESSMENT — PAIN DESCRIPTION - DESCRIPTORS: DESCRIPTORS: ACHING

## 2024-10-14 ASSESSMENT — PAIN DESCRIPTION - LOCATION: LOCATION: HEAD

## 2024-10-15 VITALS
HEIGHT: 64 IN | DIASTOLIC BLOOD PRESSURE: 74 MMHG | WEIGHT: 110.01 LBS | SYSTOLIC BLOOD PRESSURE: 116 MMHG | BODY MASS INDEX: 18.78 KG/M2 | HEART RATE: 66 BPM | RESPIRATION RATE: 18 BRPM | OXYGEN SATURATION: 98 % | TEMPERATURE: 98.5 F

## 2024-10-15 LAB
ALBUMIN SERPL-MCNC: 3.5 G/DL (ref 3.4–5)
ANION GAP SERPL CALCULATED.3IONS-SCNC: 4 MMOL/L (ref 3–16)
BUN SERPL-MCNC: 22 MG/DL (ref 7–20)
CALCIUM SERPL-MCNC: 8.8 MG/DL (ref 8.3–10.6)
CHLORIDE SERPL-SCNC: 105 MMOL/L (ref 99–110)
CO2 SERPL-SCNC: 34 MMOL/L (ref 21–32)
CREAT SERPL-MCNC: 1.2 MG/DL (ref 0.6–1.2)
GFR SERPLBLD CREATININE-BSD FMLA CKD-EPI: 45 ML/MIN/{1.73_M2}
GLUCOSE SERPL-MCNC: 100 MG/DL (ref 70–99)
PHOSPHATE SERPL-MCNC: 2.6 MG/DL (ref 2.5–4.9)
POTASSIUM SERPL-SCNC: 4.2 MMOL/L (ref 3.5–5.1)
SODIUM SERPL-SCNC: 143 MMOL/L (ref 136–145)

## 2024-10-15 PROCEDURE — 6370000000 HC RX 637 (ALT 250 FOR IP): Performed by: INTERNAL MEDICINE

## 2024-10-15 PROCEDURE — 6360000002 HC RX W HCPCS: Performed by: NURSE PRACTITIONER

## 2024-10-15 PROCEDURE — 6370000000 HC RX 637 (ALT 250 FOR IP): Performed by: NURSE PRACTITIONER

## 2024-10-15 PROCEDURE — 97530 THERAPEUTIC ACTIVITIES: CPT

## 2024-10-15 PROCEDURE — 2700000000 HC OXYGEN THERAPY PER DAY

## 2024-10-15 PROCEDURE — 97162 PT EVAL MOD COMPLEX 30 MIN: CPT

## 2024-10-15 PROCEDURE — 80069 RENAL FUNCTION PANEL: CPT

## 2024-10-15 PROCEDURE — 94640 AIRWAY INHALATION TREATMENT: CPT

## 2024-10-15 PROCEDURE — 6360000002 HC RX W HCPCS: Performed by: INTERNAL MEDICINE

## 2024-10-15 PROCEDURE — 94761 N-INVAS EAR/PLS OXIMETRY MLT: CPT

## 2024-10-15 PROCEDURE — 36415 COLL VENOUS BLD VENIPUNCTURE: CPT

## 2024-10-15 PROCEDURE — 2580000003 HC RX 258: Performed by: NURSE PRACTITIONER

## 2024-10-15 RX ORDER — FLUTICASONE PROPIONATE 50 MCG
2 SPRAY, SUSPENSION (ML) NASAL DAILY
Qty: 16 G | Refills: 3 | Status: SHIPPED | OUTPATIENT
Start: 2024-10-16

## 2024-10-15 RX ORDER — PREDNISONE 10 MG/1
TABLET ORAL
Qty: 30 TABLET | Refills: 0 | Status: SHIPPED | OUTPATIENT
Start: 2024-10-15 | End: 2024-10-27

## 2024-10-15 RX ADMIN — FLUTICASONE PROPIONATE 2 SPRAY: 50 SPRAY, METERED NASAL at 08:20

## 2024-10-15 RX ADMIN — TORSEMIDE 20 MG: 20 TABLET ORAL at 08:22

## 2024-10-15 RX ADMIN — ONDANSETRON 4 MG: 2 INJECTION INTRAMUSCULAR; INTRAVENOUS at 07:42

## 2024-10-15 RX ADMIN — ROFLUMILAST 500 MCG: 500 TABLET ORAL at 08:23

## 2024-10-15 RX ADMIN — APIXABAN 2.5 MG: 5 TABLET, FILM COATED ORAL at 08:19

## 2024-10-15 RX ADMIN — PANTOPRAZOLE SODIUM 40 MG: 40 TABLET, DELAYED RELEASE ORAL at 08:19

## 2024-10-15 RX ADMIN — BUSPIRONE HYDROCHLORIDE 5 MG: 5 TABLET ORAL at 04:43

## 2024-10-15 RX ADMIN — ALBUTEROL SULFATE 2.5 MG: 2.5 SOLUTION RESPIRATORY (INHALATION) at 08:39

## 2024-10-15 RX ADMIN — Medication 1 CAPSULE: at 08:19

## 2024-10-15 RX ADMIN — BUSPIRONE HYDROCHLORIDE 5 MG: 5 TABLET ORAL at 14:10

## 2024-10-15 RX ADMIN — WATER 40 MG: 1 INJECTION INTRAMUSCULAR; INTRAVENOUS; SUBCUTANEOUS at 10:58

## 2024-10-15 RX ADMIN — METOPROLOL TARTRATE 25 MG: 25 TABLET, FILM COATED ORAL at 08:19

## 2024-10-15 RX ADMIN — ACETAMINOPHEN 650 MG: 325 TABLET ORAL at 10:58

## 2024-10-15 ASSESSMENT — PAIN SCALES - WONG BAKER
WONGBAKER_NUMERICALRESPONSE: NO HURT

## 2024-10-15 NOTE — PLAN OF CARE
Problem: Chronic Conditions and Co-morbidities  Goal: Patient's chronic conditions and co-morbidity symptoms are monitored and maintained or improved  10/13/2024 0314 by Pat Queen RN  Outcome: Progressing  10/13/2024 0314 by Pat Queen RN  Outcome: Progressing     Problem: Discharge Planning  Goal: Discharge to home or other facility with appropriate resources  10/13/2024 0314 by Pat Queen RN  Outcome: Progressing  10/13/2024 0314 by Pat Queen RN  Outcome: Progressing     Problem: Safety - Adult  Goal: Free from fall injury  10/13/2024 0314 by Pat Queen RN  Outcome: Progressing  10/13/2024 0314 by Pat Queen RN  Outcome: Progressing     Problem: ABCDS Injury Assessment  Goal: Absence of physical injury  10/13/2024 0314 by Pat Queen RN  Outcome: Progressing  10/13/2024 0314 by Pat Queen RN  Outcome: Progressing     Problem: Pain  Goal: Verbalizes/displays adequate comfort level or baseline comfort level  10/13/2024 0314 by Pat Queen RN  Outcome: Progressing  10/13/2024 0314 by Pat Quene RN  Outcome: Progressing     
  Problem: Chronic Conditions and Co-morbidities  Goal: Patient's chronic conditions and co-morbidity symptoms are monitored and maintained or improved  10/13/2024 2150 by Ignacia Samano RN  Outcome: Progressing  10/13/2024 2149 by Ignacia Samano RN  Outcome: Progressing  10/13/2024 0939 by Sherie Baca RN  Outcome: Progressing     Problem: Discharge Planning  Goal: Discharge to home or other facility with appropriate resources  10/13/2024 2150 by Ignacia Samano RN  Outcome: Progressing  10/13/2024 2149 by Ignacia Samano RN  Outcome: Progressing     Problem: Safety - Adult  Goal: Free from fall injury  10/13/2024 2150 by Ignacia Samano RN  Outcome: Progressing  10/13/2024 2149 by Ignacia Samano RN  Outcome: Progressing     Problem: ABCDS Injury Assessment  Goal: Absence of physical injury  10/13/2024 2150 by Ignacia Samano RN  Outcome: Progressing  10/13/2024 2149 by Ignacia Samano RN  Outcome: Progressing  10/13/2024 0939 by Sherie Baca RN  Outcome: Progressing     Problem: Pain  Goal: Verbalizes/displays adequate comfort level or baseline comfort level  10/13/2024 2150 by Ignacia Samano RN  Outcome: Progressing  10/13/2024 0939 by Sherie Baca RN  Outcome: Progressing     
  Problem: Chronic Conditions and Co-morbidities  Goal: Patient's chronic conditions and co-morbidity symptoms are monitored and maintained or improved  Outcome: Progressing     Problem: Discharge Planning  Goal: Discharge to home or other facility with appropriate resources  Outcome: Progressing     Problem: Safety - Adult  Goal: Free from fall injury  Outcome: Progressing     Problem: ABCDS Injury Assessment  Goal: Absence of physical injury  Outcome: Progressing     
  Problem: Chronic Conditions and Co-morbidities  Goal: Patient's chronic conditions and co-morbidity symptoms are monitored and maintained or improved  Outcome: Progressing  Flowsheets (Taken 10/14/2024 1609 by Lisa Albert RN)  Care Plan - Patient's Chronic Conditions and Co-Morbidity Symptoms are Monitored and Maintained or Improved:   Monitor and assess patient's chronic conditions and comorbid symptoms for stability, deterioration, or improvement   Update acute care plan with appropriate goals if chronic or comorbid symptoms are exacerbated and prevent overall improvement and discharge   Collaborate with multidisciplinary team to address chronic and comorbid conditions and prevent exacerbation or deterioration     
Increase function to baseline.  
Increase patients ADLs/functional status to baseline.    
please see Education Tab      Pt sitting in bed at this time on  3 L O2. Pt with complaints of shortness of breath. Pt without lower extremity edema.     Patient and/or Family's stated Goal of Care this Admission: reduce shortness of breath, increase activity tolerance, better understand heart failure and disease management, and be more comfortable prior to discharge        :      Problem: Pain  Goal: Verbalizes/displays adequate comfort level or baseline comfort level  10/13/2024 0939 by Sherie Baca, RN  Outcome: Progressing        Problem: ABCDS Injury Assessment  Goal: Absence of physical injury  10/13/2024 0939 by Sherie Baca, RN  Outcome: Progressing

## 2024-10-15 NOTE — DISCHARGE INSTR - COC
Status/Restrictions: { CC Weight Bearin}  Other Medical Equipment (for information only, NOT a DME order):  {EQUIPMENT:[id number]}  Other Treatments: ***    Patient's personal belongings (please select all that are sent with patient):  {CHP DME Belongings:320916720}    RN SIGNATURE:  {Esignature:[id number]}    CASE MANAGEMENT/SOCIAL WORK SECTION    Inpatient Status Date: ***    Readmission Risk Assessment Score:  Readmission Risk              Risk of Unplanned Readmission:  39           Discharging to Facility/ Agency   [facility]  Services: Home Health Services  [address]  [facility] [address]  [phone]     / signature: Electronically signed by Judie Navas RN on 10/15/24 at 12:53 PM EDT    PHYSICIAN SECTION    Prognosis: Good    Condition at Discharge: Stable    Rehab Potential (if transferring to Rehab): Good    Recommended Labs or Other Treatments After Discharge: PT/OT    Physician Certification: I certify the above information and transfer of Terri Smith  is necessary for the continuing treatment of the diagnosis listed and that she requires Home Care for greater 30 days.     Update Admission H&P: No change in H&P    PHYSICIAN SIGNATURE:  Electronically signed by CLIFTON Lebron CNP on 10/15/24 at 1:19 PM EDT

## 2024-10-15 NOTE — PROGRESS NOTES
[facility] Medicine Progress Note   V10.9     Date of Admission: 10/11/2024  Hospital Day: 3    Chief Admission Complaint:  SOB     Subjective:  no new complaints or events overnight    Presenting Admission History:       \"84 y.o. female, with past medical history of COPD, hypertension, hyperlipidemia, atrial fibrillation, anxiety/depression, who presented to [facility] with shortness of breath.  History obtained from the patient and review of EMR.  Patient stated over the last couple of days she has been experiencing an increase in shortness of breath.  She stated her shortness of breath is worse on exertion.  Patient reports a history of COPD and does wear 2.5-3 L of supplemental oxygen via nasal cannula at home.  The patient stated she does have some mild left-sided chest pain.  However, she states that she believes it is \"noncardiac in nature\" and due to trying to breathe so hard.  Per EMR, when EMS was called they arrived and found the patient's nasal cannula to be broken.  They were speculating that she may not have been getting oxygen at that time. In the emergency department a chest x-ray was obtained that revealed no acute cardiopulmonary findings.  The patient was given Solu-Medrol.  Upon further evaluation, the patient was found to have an NADJA with a creatinine of 2.0.  However, her baseline creatinine appears to be 1.1-1.3.  She was also found to have an elevated troponin of 36 with a repeat troponin of 39.  The patient denies any chest pain.  This is likely secondary to her NADJA.  The patient was treated for sepsis protocol in the emergency department with 500 mL normal saline as well as azithromycin and ceftriaxone.  Antibiotics were continued due to COPD exacerbation.  However, the patient does not meet sepsis criteria.  She does appear to have a leukocytosis.  However, this is likely secondary to daily steroid use.  Patient vital signs all within normal limit, lactic 1.8, the patient was admitted 
  [facility] Medicine Progress Note   V10.9     Date of Admission: 10/11/2024  Hospital Day: 2    Chief Admission Complaint:  SOB     Subjective:  breathing a little better today    Presenting Admission History:       \"84 y.o. female, with past medical history of COPD, hypertension, hyperlipidemia, atrial fibrillation, anxiety/depression, [facility]  History obtained from the patient and review of EMR.  Patient stated over the last couple of days she has been experiencing an increase in shortness of breath.  She stated her shortness of breath is worse on exertion.  Patient reports a history of COPD and does wear 2.5-3 L of supplemental oxygen via nasal cannula at home.  The patient stated she does have some mild left-sided chest pain.  However, she states that she believes it is \"noncardiac in nature\" and due to trying to breathe so hard.  Per EMR, when EMS was called they arrived and found the patient's nasal cannula to be broken.  They were speculating that she may not have been getting oxygen at that time. In the emergency department a chest x-ray was obtained that revealed no acute cardiopulmonary findings.  The patient was given Solu-Medrol.  Upon further evaluation, the patient was found to have an NADJA with a creatinine of 2.0.  However, her baseline creatinine appears to be 1.1-1.3.  She was also found to have an elevated troponin of 36 with a repeat troponin of 39.  The patient denies any chest pain.  This is likely secondary to her NADJA.  The patient was treated for sepsis protocol in the emergency department with 500 mL normal saline as well as azithromycin and ceftriaxone.  Antibiotics were continued due to COPD exacerbation.  However, the patient does not meet sepsis criteria.  She does appear to have a leukocytosis.  However, this is likely secondary to daily steroid use.  Patient vital signs all within normal limit, lactic 1.8, the patient was admitted for 
  Physician Progress Note      PATIENT:               SHARON HANEY  CSN #:                  [id number]  :                       1940  ADMIT DATE:       10/11/2024 7:40 PM  DISCH DATE:  RESPONDING  PROVIDER #:        CHARLOTTE HARRINGTON          QUERY TEXT:    Patient admitted with COPD exacerbation, noted to have CKD. If possible,   please document in progress notes and discharge summary if you are evaluating   and/or treating any of the following:    The medical record reflects the following:  Risk Factors: Age 83yo. Hx- asthma, COPD, CAD s/p CABG x 2, history of GI   bleed, hypertension, heart failure, A-fib, CKD, Home O2 2-3L  Clinical Indicators:  GFR 24, 32, 34....CRT 2.0, 1.6, 1.5  Treatment: 500cc NS IV bolus, Monitor  Options provided:  -- CKD Stage 1 GFR>90  -- CKD Stage 2 GFR 60-90  -- CKD Stage 3a GFR 45-59  -- CKD Stage 3b GFR 30-44  -- CKD Stage 4 GFR 15-29  -- CKD Stage 5 GFR<15  -- ESRD  -- CKD ruled out  -- Other - I will add my own diagnosis  -- Disagree - Not applicable / Not valid  -- Disagree - Clinically unable to determine / Unknown  -- Refer to Clinical Documentation Reviewer    PROVIDER RESPONSE TEXT:    Provider disagreed with this query.    Query created by: Amanda Gonzalez on 10/13/2024 10:54 AM      Electronically signed by:  CHARLOTTE HARRINGTON 10/13/2024 11:06 AM          
  [facility] Medicine Progress Note   V10.9     Date of Admission: 10/11/2024  Hospital Day: 4    Chief Admission Complaint:  SOB     Subjective:  still reports sob    Presenting Admission History:       \"84 y.o. female, with past medical history of COPD, hypertension, hyperlipidemia, atrial fibrillation, anxiety/depression, who presented to [facility] with shortness of breath.  History obtained from the patient and review of EMR.  Patient stated over the last couple of days she has been experiencing an increase in shortness of breath.  She stated her shortness of breath is worse on exertion.  Patient reports a history of COPD and does wear 2.5-3 L of supplemental oxygen via nasal cannula at home.  The patient stated she does have some mild left-sided chest pain.  However, she states that she believes it is \"noncardiac in nature\" and due to trying to breathe so hard.  Per EMR, when EMS was called they arrived and found the patient's nasal cannula to be broken.  They were speculating that she may not have been getting oxygen at that time. In the emergency department a chest x-ray was obtained that revealed no acute cardiopulmonary findings.  The patient was given Solu-Medrol.  Upon further evaluation, the patient was found to have an NADJA with a creatinine of 2.0.  However, her baseline creatinine appears to be 1.1-1.3.  She was also found to have an elevated troponin of 36 with a repeat troponin of 39.  The patient denies any chest pain.  This is likely secondary to her NADJA.  The patient was treated for sepsis protocol in the emergency department with 500 mL normal saline as well as azithromycin and ceftriaxone.  Antibiotics were continued due to COPD exacerbation.  However, the patient does not meet sepsis criteria.  She does appear to have a leukocytosis.  However, this is likely secondary to daily steroid use.  Patient vital signs all within normal limit, lactic 1.8, the patient was admitted for further 
  [facility] Progress Note           Subjective/   84 y.o. year old female who we are seeing in consultation for NADJA.     HPI:  - Renal function slowly improving, now off fluids, non-oliguric.  - States still feels short of breath, on 2-3 L NC which she uses at home.  - ROS: +weak, intake adequate.  - Social Hx: discussed with family present     Objective/   GEN:  Chronically ill, /66   Pulse 90   Temp 98.3 °F (36.8 °C) (Oral)   Resp 16   Ht 1.626 m (5' 4\")   Wt 49.9 kg (110 lb 0.2 oz)   SpO2 97%   BMI 18.88 kg/m²   HEENT: non-icteric, no JVD  CV: S1, S2 without m/r/g; no LE edema  RESP: CTA B without w/r/r; breathing wnl  ABD: +bs, soft, nt, no hsm  SKIN: warm, no rashes    Data/  Recent Labs     10/13/24  0623 10/14/24  0615   WBC 10.1 9.7   HGB 9.6* 11.0*   HCT 30.0* 34.2*   MCV 94.0 94.5    257     Recent Labs     10/12/24  0853 10/13/24  0623 10/14/24  0615   NA  --  142 145   K 5.0 4.0 4.1   CL  --  100 104   CO2  --  38* 36*   GLUCOSE  --  96 81   PHOS  --  4.0 3.3   BUN  --  37* 26*   CREATININE  --  1.5* 1.2   LABGLOM  --  34* 45*     UA bland    Assessment/     # Acute kidney injury  - renal hypoperfusion injury in setting of hypotension/diuretic use  - SCr 2.0 on admission, trending down quickly with fluids, now off, non-oliguric.  Now resolved.   - Prior baseline Cr 1.1-1.3 range     # Hyperkalemia  - K as high as 6.3, resolved with medical mgmt   - She was on potassium supplement at home: would hold.      # Lactic acidosis     # COPD exacerbation     Plan/     - Monitoring off of fluids for now  - Encouraged oral intake  - Hold PTA amlodipine (5mg qday) for now with lower BP's  - Will need to restart diuretics: torsemide (20mg qday).  - Trend labs, bp's, weights, & urine output  - Repeat labs in 1 week.      ____________________________________  R Josh Manuel MD  [facility]  [email or web address]  Office: [phone]    
  [facility] Progress Note           Subjective/   84 y.o. year old female who we are seeing in consultation for NADJA.     HPI:  - Renal function slowly improving, now off fluids, non-oliguric.  - States still feels short of breath, on 2-3 L NC which she uses at home.  - ROS: +weak, intake adequate.  - Social Hx: discussed with family present     Objective/   GEN:  Chronically ill, BP (!) 147/77   Pulse 71   Temp 97.5 °F (36.4 °C) (Axillary)   Resp 18   Ht 1.626 m (5' 4\")   Wt 49.9 kg (110 lb 0.2 oz)   SpO2 100%   BMI 18.88 kg/m²   HEENT: non-icteric, no JVD  CV: S1, S2 without m/r/g; no LE edema  RESP: CTA B without w/r/r; breathing wnl  ABD: +bs, soft, nt, no hsm  SKIN: warm, no rashes    Data/  Recent Labs     10/12/24  0625 10/13/24  0623 10/14/24  0615   WBC 10.7 10.1 9.7   HGB 11.0* 9.6* 11.0*   HCT 35.1* 30.0* 34.2*   MCV 96.7 94.0 94.5    255 257     Recent Labs     10/12/24  0625 10/12/24  0853 10/13/24  0623 10/14/24  0615     --  142 145   K 6.3* 5.0 4.0 4.1     --  100 104   CO2 32  --  38* 36*   GLUCOSE 155*  --  96 81   PHOS  --   --  4.0 3.3   BUN 36*  --  37* 26*   CREATININE 1.6*  --  1.5* 1.2   LABGLOM 32*  --  34* 45*     UA bland    Assessment/     # Acute kidney injury  - renal hypoperfusion injury in setting of hypotension/diuretic use  - SCr 2.0 on admission, trending down with fluids, now off, non-oliguric.  - Prior baseline Cr 1.1-1.3 range     # Hyperkalemia  - K as high as 6.3, resolved with medical mgmt   - She was on potassium supplement at home: would hold.      # Lactic acidosis     # COPD exacerbation     Plan/     - Monitoring off of fluids for now  - Encouraged oral intake  - Hold PTA torsemide (20mg qday) & amlodipine (5mg qday) for now with lower BP's   - Eventually will need to restart Lasix: tomorrow.   - Trend labs, bp's, weights, & urine output     ____________________________________  R Josh Manuel MD  [facility] 
  [facility] Progress Note           Subjective/   84 y.o. year old female who we are seeing in consultation for NADJA.     HPI:  Renal function slowly improving, now off fluids, non-oliguric.  States still feels short of breath, on 2-3 L NC which she uses at home.    ROS:  +weak, intake adequate.    Objective/   GEN:  Chronically ill, BP (!) 144/77   Pulse 69   Temp 97.7 °F (36.5 °C) (Oral)   Resp 22   Ht 1.626 m (5' 4\")   Wt 49.9 kg (110 lb 0.2 oz)   SpO2 99%   BMI 18.88 kg/m²   HEENT: non-icteric, no JVD  CV: S1, S2 without m/r/g; no LE edema  RESP: CTA B without w/r/r; breathing wnl  ABD: +bs, soft, nt, no hsm  SKIN: warm, no rashes    Data/  Recent Labs     10/11/24  2014 10/12/24  0625 10/13/24  0623   WBC 12.8* 10.7 10.1   HGB 11.6* 11.0* 9.6*   HCT 36.5 35.1* 30.0*   MCV 93.8 96.7 94.0    254 255     Recent Labs     10/11/24  2014 10/12/24  0625 10/12/24  0853 10/13/24  0623    141  --  142   K 4.7 6.3* 5.0 4.0   CL 94* 101  --  100   CO2 34* 32  --  38*   GLUCOSE 127* 155*  --  96   PHOS  --   --   --  4.0   BUN 34* 36*  --  37*   CREATININE 2.0* 1.6*  --  1.5*   LABGLOM 24* 32*  --  34*     UA bland    Assessment/     - Acute kidney injury - renal hypoperfusion injury in setting of hypotension/diuretic use   SCr 2.0 on admission, trending down with fluids, now off, non-oliguric, prior 1.1-1.3 range     - Hyperkalemia  K as high as 6.3, resolved with medical mgmt      - Lactic acidosis     - COPD exacerbation     Plan/     - Monitoring off of fluids for now, encouraged oral intake  - Hold torsemide & amlodipine for now with lower BP's  - Trend labs, bp's, weights, & urine output     ____________________________________  Dmitri Naik MD  [facility]  [email or web address]  Office: [phone]    
Nephrology consult called at [phone] to [facility].  
Physical Therapy  Facility/Department: [place] [address] [facility]  Physical Therapy Initial Assessment/Treatment    Name: Terri Smith  : 1940  MRN: [id number]  Date of Service: 10/15/2024    Discharge Recommendations:  Home with Home health PT, 24 hour supervision or assist   PT Equipment Recommendations  Equipment Needed: No      Patient Diagnosis(es): The primary encounter diagnosis was COPD exacerbation (HCC). Diagnoses of SIRS (systemic inflammatory response syndrome) (HCC), Dyspnea, unspecified type, and NADJA (acute kidney injury) (HCC) were also pertinent to this visit.  Past Medical History:  has a past medical history of NADJA (acute kidney injury) (HCC), Asthma, Atrial fibrillation with RVR (HCC), CAD (coronary artery disease), CHF (congestive heart failure) (HCC), Chronic anxiety, COPD (chronic obstructive pulmonary disease) (HCC), COPD (chronic obstructive pulmonary disease) (HCC), GERD (gastroesophageal reflux disease), Heart attack (HCC), History of blood transfusion, Hyperlipidemia, Hypertension, MRSA (methicillin resistant staph aureus) culture positive, OA (osteoarthritis), and Thyroid disease.  Past Surgical History:  has a past surgical history that includes  section; Mastectomy, partial (Left); bronchoscopy (2019); Cardiac catheterization (2019); Coronary artery bypass graft (N/A, 2019); Colonoscopy (N/A, 2020); Sigmoidoscopy (2020); sigmoidoscopy (N/A, 2020); IR MIDLINE CATH (2021); Colonoscopy (N/A, 10/22/2021); Upper gastrointestinal endoscopy (N/A, 2023); Upper gastrointestinal endoscopy (N/A, 2023); Upper gastrointestinal endoscopy (2023); Colonoscopy (N/A, 2023); sigmoidoscopy (N/A, 2023); Upper gastrointestinal endoscopy (N/A, 2024); Upper gastrointestinal endoscopy (N/A, 2024); and Upper gastrointestinal endoscopy (2024).    Assessment  Body Structures, Functions, Activity Limitations Requiring 
Pt DC to car with family  
electrolyte abnormalities requiring IV replacement and close serial monitoring  [] Insulin - monitoring FSBS for Hypoglycemic ADR  [] Anticoagulation requiring close serial monitoring    [] HD requiring close serial monitoring of electrolytes and fluid status  [] Other -  [] Change in code status:    [] Decision to escalate care:    [] Major surgery/procedure with associated risk factors:    ----------------------------------------------------------------------  C. Data (any 2)  [x] Data Review (any 3)  [x] All available Consultant notes from yesterday/today were reviewed  [x] All current labs were reviewed on 10/15/2024 and interpreted for clinical significance   [x] Appropriate follow-up labs were ordered  [] Collateral history obtained:    [] Independent Interpretation of tests (any 1)  [] Telemetry (Rhythm Strip) personally reviewed and interpreted as documented above.      [] Imaging personally reviewed and interpreted, includes:    [x] Discussion (any 1)  [x] Discussed the discharge plan in detail with case management in interdisciplinary rounds on 10/15/2024 including timing/barriers to discharge, need for support services and/or placement decision  [] Discussed management of the case with:      Medications:  Personally reviewed on 10/15/2024 in detail in conjunction w/ labs as documented for evidence of drug toxicity.     Infusion Medications    sodium chloride       Scheduled Medications    fluticasone  2 spray Each Nostril Daily    methylPREDNISolone  40 mg IntraVENous Q12H    albuterol  2.5 mg Nebulization 4x Daily RT    [Held by provider] amLODIPine  5 mg Oral Daily    atorvastatin  40 mg Oral Nightly    apixaban  2.5 mg Oral BID    escitalopram  10 mg Oral Nightly    lactobacillus  1 capsule Oral Daily with breakfast    pantoprazole  40 mg Oral BID    Roflumilast  500 mcg Oral Daily    torsemide  20 mg Oral Daily    metoprolol tartrate  25 mg Oral BID    sodium chloride flush  5-40 mL IntraVENous 2 
20  AM-PAC Inpatient ADL T-Scale Score : 42.03  ADL Inpatient CMS 0-100% Score: 38.32  ADL Inpatient CMS G-Code Modifier : CJ    Goals  Short Term Goals  Time Frame for Short Term Goals: 10/21, unless otherwise noted  Short Term Goal 1: Pt will complete functional transfer/toilet transfer with SPV  Short Term Goal 2: Pt will complete toileting with SPV  Short Term Goal 3: Pt will complete LB dressing with SPV  Short Term Goal 4: Pt will complete dynamic standing grooming tasks (2-4 tasks) with SPV  Short Term Goal 5: Pt will complete UE HEP 15x reps each by 10/19      Therapy Time   Individual Concurrent Group Co-treatment   Time In 1145         Time Out 1218         Minutes 33         Timed Code Treatment Minutes: 23 Minutes (10 minute eval)       Arely Gonzalez, OT     
Normal

## 2024-10-15 NOTE — CARE COORDINATION
CASE MANAGEMENT DISCHARGE SUMMARY    Discharge to: Home with [facility] resumed for nurse, PT, OT    IMM given: 10/14     New Durable Medical Equipment ordered/agency: none, pt's son bringing portable O2 tank when he picks her up    Transportation:    Family/car: Yes, son    Confirmed discharge plan with:   Patient: yes     Family:  yes at bedside     Facility/Agency, name:  [facility] to be pulled by Fátima/SHELLY, writer confirmed with her.     RN, name: LITZY Alcocer-SUJIT       
Chart reviewed; Nephro, IM involved.  Pt has home O2 and cpap through [facility], active with [facility].  PT/OT ordered now.  CM will continue to follow pt's progress and coordinate discharge arrangements as appropriate.  MARKOS Benítez     
treatment goals of COPD exacerbation (HCC) [J44.1]  SIRS (systemic inflammatory response syndrome) (HCC) [R65.10]  NADJA (acute kidney injury) ([facility]) [N17.9]  Dyspnea, unspecified type [R06.00]    IF APPLICABLE: The Patient and/or patient representative Terri and her family were provided with a choice of provider and agrees with the discharge plan. Freedom of choice list with basic dialogue that supports the patient's individualized plan of care/goals and shares the quality data associated with the providers was provided to:     Patient Representative Name:       The Patient and/or Patient Representative Agree with the Discharge Plan?      Aure Ramirez RN  Case Management Department

## 2024-10-16 NOTE — DISCHARGE SUMMARY
use     Anxiety, depression.  Continue home regimen     Myocardial injury.  NADJA likely contributing to impaired clearance.      Physical Exam Performed:      /74   Pulse 66   Temp 98.5 °F (36.9 °C)   Resp 18   Ht 1.626 m (5' 4\")   Wt 49.9 kg (110 lb 0.2 oz)   SpO2 98%   BMI 18.88 kg/m²     General appearance:  No apparent distress, appears stated age and cooperative.  Respiratory:  Normal respiratory effort.  3L per NC  Cardiovascular:  Regular rate and rhythm.  Abdomen:  Soft, non-tender, non-distended.  Musculoskeletal:  No edema  Neurologic:  Non-focal  Psychiatric:  Alert and oriented    Patient Discharge Instructions:      Follow up:    1.  Primary Care Provider Jackson Ontiveros MD in the next 1-2 weeks.    The patient was seen and examined on day of discharge and this discharge summary is in conjunction with any daily progress note from day of discharge. Time spent on discharge: 35 minutes in the examination, evaluation, counseling and review of medications and discharge plan.    ------------------------------------------------------------------------------------------------------------------------------------------------------    Discharge Medications:   Discharge Medication List as of 10/15/2024  1:38 PM        START taking these medications    Details   fluticasone (FLONASE) 50 MCG/ACT nasal spray 2 sprays by Each Nostril route daily, Disp-16 g, R-3Normal           Discharge Medication List as of 10/15/2024  1:38 PM        CONTINUE these medications which have CHANGED    Details   predniSONE (DELTASONE) 10 MG tablet Take 4 tablets by mouth daily for 3 days, THEN 3 tablets daily for 3 days, THEN 2 tablets daily for 3 days, THEN 1 tablet daily for 3 days., Disp-30 tablet, R-0Normal           Discharge Medication List as of 10/15/2024  1:38 PM        CONTINUE these medications which have NOT CHANGED    Details   ferrous sulfate (IRON 325) 325 (65 Fe) MG tablet Take 1 tablet by mouth daily

## 2024-11-21 ENCOUNTER — HOSPITAL ENCOUNTER (INPATIENT)
Age: 84
LOS: 4 days | Discharge: HOME HEALTH | DRG: 291 | End: 2024-11-25
Attending: EMERGENCY MEDICINE | Admitting: STUDENT IN AN ORGANIZED HEALTH CARE EDUCATION/TRAINING PROGRAM
Payer: MEDICARE

## 2024-11-21 ENCOUNTER — APPOINTMENT (OUTPATIENT)
Dept: GENERAL RADIOLOGY | Age: 84
DRG: 291 | End: 2024-11-21
Payer: MEDICARE

## 2024-11-21 DIAGNOSIS — J44.1 COPD EXACERBATION (HCC): ICD-10-CM

## 2024-11-21 DIAGNOSIS — R07.9 CHEST PAIN, UNSPECIFIED TYPE: ICD-10-CM

## 2024-11-21 DIAGNOSIS — R79.89 ELEVATED BRAIN NATRIURETIC PEPTIDE (BNP) LEVEL: ICD-10-CM

## 2024-11-21 DIAGNOSIS — I50.9 ACUTE ON CHRONIC CONGESTIVE HEART FAILURE, UNSPECIFIED HEART FAILURE TYPE (HCC): Primary | ICD-10-CM

## 2024-11-21 DIAGNOSIS — R79.89 ELEVATED TROPONIN: ICD-10-CM

## 2024-11-21 PROBLEM — I50.33 ACUTE ON CHRONIC HEART FAILURE WITH NORMAL EJECTION FRACTION (HCC): Status: ACTIVE | Noted: 2024-11-21

## 2024-11-21 LAB
ALBUMIN SERPL-MCNC: 4 G/DL (ref 3.4–5)
ALBUMIN/GLOB SERPL: 1.2 {RATIO} (ref 1.1–2.2)
ALP SERPL-CCNC: 79 U/L (ref 40–129)
ALT SERPL-CCNC: 7 U/L (ref 10–40)
ANION GAP SERPL CALCULATED.3IONS-SCNC: 11 MMOL/L (ref 3–16)
AST SERPL-CCNC: 18 U/L (ref 15–37)
BASOPHILS # BLD: 0 K/UL (ref 0–0.2)
BASOPHILS NFR BLD: 0.3 %
BILIRUB SERPL-MCNC: <0.2 MG/DL (ref 0–1)
BILIRUB UR QL STRIP.AUTO: NEGATIVE
BUN SERPL-MCNC: 32 MG/DL (ref 7–20)
CALCIUM SERPL-MCNC: 10.7 MG/DL (ref 8.3–10.6)
CHLORIDE SERPL-SCNC: 101 MMOL/L (ref 99–110)
CLARITY UR: CLEAR
CO2 SERPL-SCNC: 30 MMOL/L (ref 21–32)
COLOR UR: YELLOW
CREAT SERPL-MCNC: 1.3 MG/DL (ref 0.6–1.2)
DEPRECATED RDW RBC AUTO: 14 % (ref 12.4–15.4)
EOSINOPHIL # BLD: 0 K/UL (ref 0–0.6)
EOSINOPHIL NFR BLD: 0.1 %
FLUAV RNA RESP QL NAA+PROBE: NOT DETECTED
FLUBV RNA RESP QL NAA+PROBE: NOT DETECTED
GFR SERPLBLD CREATININE-BSD FMLA CKD-EPI: 41 ML/MIN/{1.73_M2}
GLUCOSE SERPL-MCNC: 86 MG/DL (ref 70–99)
GLUCOSE UR STRIP.AUTO-MCNC: NEGATIVE MG/DL
HCT VFR BLD AUTO: 39.3 % (ref 36–48)
HGB BLD-MCNC: 12.3 G/DL (ref 12–16)
HGB UR QL STRIP.AUTO: NEGATIVE
KETONES UR STRIP.AUTO-MCNC: NEGATIVE MG/DL
LEUKOCYTE ESTERASE UR QL STRIP.AUTO: NEGATIVE
LYMPHOCYTES # BLD: 1.2 K/UL (ref 1–5.1)
LYMPHOCYTES NFR BLD: 7.6 %
MAGNESIUM SERPL-MCNC: 2.34 MG/DL (ref 1.8–2.4)
MCH RBC QN AUTO: 29.9 PG (ref 26–34)
MCHC RBC AUTO-ENTMCNC: 31.2 G/DL (ref 31–36)
MCV RBC AUTO: 95.7 FL (ref 80–100)
MONOCYTES # BLD: 0.7 K/UL (ref 0–1.3)
MONOCYTES NFR BLD: 4.3 %
NEUTROPHILS # BLD: 13.3 K/UL (ref 1.7–7.7)
NEUTROPHILS NFR BLD: 87.7 %
NITRITE UR QL STRIP.AUTO: NEGATIVE
NT-PROBNP SERPL-MCNC: 1755 PG/ML (ref 0–449)
PH UR STRIP.AUTO: 6 [PH] (ref 5–8)
PLATELET # BLD AUTO: 308 K/UL (ref 135–450)
PMV BLD AUTO: 8 FL (ref 5–10.5)
POTASSIUM SERPL-SCNC: 4.9 MMOL/L (ref 3.5–5.1)
PROCALCITONIN SERPL IA-MCNC: 0.08 NG/ML (ref 0–0.15)
PROT SERPL-MCNC: 7.3 G/DL (ref 6.4–8.2)
PROT UR STRIP.AUTO-MCNC: NEGATIVE MG/DL
RBC # BLD AUTO: 4.11 M/UL (ref 4–5.2)
SARS-COV-2 RNA RESP QL NAA+PROBE: NOT DETECTED
SODIUM SERPL-SCNC: 142 MMOL/L (ref 136–145)
SP GR UR STRIP.AUTO: 1.01 (ref 1–1.03)
TROPONIN, HIGH SENSITIVITY: 54 NG/L (ref 0–14)
TROPONIN, HIGH SENSITIVITY: 61 NG/L (ref 0–14)
UA COMPLETE W REFLEX CULTURE PNL UR: NORMAL
UA DIPSTICK W REFLEX MICRO PNL UR: NORMAL
UROBILINOGEN UR STRIP-ACNC: 0.2 E.U./DL
WBC # BLD AUTO: 15.2 K/UL (ref 4–11)

## 2024-11-21 PROCEDURE — 87636 SARSCOV2 & INF A&B AMP PRB: CPT

## 2024-11-21 PROCEDURE — 1200000000 HC SEMI PRIVATE

## 2024-11-21 PROCEDURE — 83880 ASSAY OF NATRIURETIC PEPTIDE: CPT

## 2024-11-21 PROCEDURE — 6360000002 HC RX W HCPCS

## 2024-11-21 PROCEDURE — 71045 X-RAY EXAM CHEST 1 VIEW: CPT

## 2024-11-21 PROCEDURE — 84484 ASSAY OF TROPONIN QUANT: CPT

## 2024-11-21 PROCEDURE — 94761 N-INVAS EAR/PLS OXIMETRY MLT: CPT

## 2024-11-21 PROCEDURE — 85025 COMPLETE CBC W/AUTO DIFF WBC: CPT

## 2024-11-21 PROCEDURE — 2700000000 HC OXYGEN THERAPY PER DAY

## 2024-11-21 PROCEDURE — 2580000003 HC RX 258: Performed by: PHYSICIAN ASSISTANT

## 2024-11-21 PROCEDURE — 99285 EMERGENCY DEPT VISIT HI MDM: CPT

## 2024-11-21 PROCEDURE — 96375 TX/PRO/DX INJ NEW DRUG ADDON: CPT

## 2024-11-21 PROCEDURE — 6370000000 HC RX 637 (ALT 250 FOR IP)

## 2024-11-21 PROCEDURE — 6360000002 HC RX W HCPCS: Performed by: PHYSICIAN ASSISTANT

## 2024-11-21 PROCEDURE — 6370000000 HC RX 637 (ALT 250 FOR IP): Performed by: NURSE PRACTITIONER

## 2024-11-21 PROCEDURE — 81003 URINALYSIS AUTO W/O SCOPE: CPT

## 2024-11-21 PROCEDURE — 96374 THER/PROPH/DIAG INJ IV PUSH: CPT

## 2024-11-21 PROCEDURE — 2580000003 HC RX 258

## 2024-11-21 PROCEDURE — 83735 ASSAY OF MAGNESIUM: CPT

## 2024-11-21 PROCEDURE — 80053 COMPREHEN METABOLIC PANEL: CPT

## 2024-11-21 PROCEDURE — 84145 PROCALCITONIN (PCT): CPT

## 2024-11-21 PROCEDURE — 94640 AIRWAY INHALATION TREATMENT: CPT

## 2024-11-21 PROCEDURE — 6370000000 HC RX 637 (ALT 250 FOR IP): Performed by: PHYSICIAN ASSISTANT

## 2024-11-21 PROCEDURE — 6370000000 HC RX 637 (ALT 250 FOR IP): Performed by: EMERGENCY MEDICINE

## 2024-11-21 RX ORDER — SODIUM CHLORIDE 0.9 % (FLUSH) 0.9 %
5-40 SYRINGE (ML) INJECTION PRN
Status: DISCONTINUED | OUTPATIENT
Start: 2024-11-21 | End: 2024-11-25 | Stop reason: HOSPADM

## 2024-11-21 RX ORDER — FLUTICASONE PROPIONATE 50 MCG
1 SPRAY, SUSPENSION (ML) NASAL DAILY PRN
Status: DISCONTINUED | OUTPATIENT
Start: 2024-11-21 | End: 2024-11-25 | Stop reason: HOSPADM

## 2024-11-21 RX ORDER — BUSPIRONE HYDROCHLORIDE 5 MG/1
10 TABLET ORAL EVERY 6 HOURS PRN
Status: DISCONTINUED | OUTPATIENT
Start: 2024-11-21 | End: 2024-11-25 | Stop reason: HOSPADM

## 2024-11-21 RX ORDER — FUROSEMIDE 10 MG/ML
40 INJECTION INTRAMUSCULAR; INTRAVENOUS 2 TIMES DAILY
Status: DISCONTINUED | OUTPATIENT
Start: 2024-11-22 | End: 2024-11-22

## 2024-11-21 RX ORDER — ACETAMINOPHEN 650 MG/1
650 SUPPOSITORY RECTAL EVERY 6 HOURS PRN
Status: DISCONTINUED | OUTPATIENT
Start: 2024-11-21 | End: 2024-11-25 | Stop reason: HOSPADM

## 2024-11-21 RX ORDER — SODIUM CHLORIDE 0.9 % (FLUSH) 0.9 %
5-40 SYRINGE (ML) INJECTION EVERY 12 HOURS SCHEDULED
Status: DISCONTINUED | OUTPATIENT
Start: 2024-11-21 | End: 2024-11-25 | Stop reason: HOSPADM

## 2024-11-21 RX ORDER — IPRATROPIUM BROMIDE AND ALBUTEROL SULFATE 2.5; .5 MG/3ML; MG/3ML
1 SOLUTION RESPIRATORY (INHALATION)
Status: DISCONTINUED | OUTPATIENT
Start: 2024-11-21 | End: 2024-11-25 | Stop reason: HOSPADM

## 2024-11-21 RX ORDER — FUROSEMIDE 10 MG/ML
40 INJECTION INTRAMUSCULAR; INTRAVENOUS ONCE
Status: COMPLETED | OUTPATIENT
Start: 2024-11-21 | End: 2024-11-21

## 2024-11-21 RX ORDER — ESCITALOPRAM OXALATE 10 MG/1
10 TABLET ORAL DAILY
Status: DISCONTINUED | OUTPATIENT
Start: 2024-11-21 | End: 2024-11-25 | Stop reason: HOSPADM

## 2024-11-21 RX ORDER — IPRATROPIUM BROMIDE AND ALBUTEROL SULFATE 2.5; .5 MG/3ML; MG/3ML
1 SOLUTION RESPIRATORY (INHALATION) ONCE
Status: COMPLETED | OUTPATIENT
Start: 2024-11-21 | End: 2024-11-21

## 2024-11-21 RX ORDER — POLYETHYLENE GLYCOL 3350 17 G/17G
17 POWDER, FOR SOLUTION ORAL DAILY PRN
Status: DISCONTINUED | OUTPATIENT
Start: 2024-11-21 | End: 2024-11-25 | Stop reason: HOSPADM

## 2024-11-21 RX ORDER — IPRATROPIUM BROMIDE AND ALBUTEROL SULFATE 2.5; .5 MG/3ML; MG/3ML
3 SOLUTION RESPIRATORY (INHALATION) ONCE
Status: DISCONTINUED | OUTPATIENT
Start: 2024-11-21 | End: 2024-11-21

## 2024-11-21 RX ORDER — METOPROLOL TARTRATE 25 MG/1
25 TABLET, FILM COATED ORAL 2 TIMES DAILY
Status: DISCONTINUED | OUTPATIENT
Start: 2024-11-21 | End: 2024-11-25 | Stop reason: HOSPADM

## 2024-11-21 RX ORDER — ROFLUMILAST 500 UG/1
500 TABLET ORAL DAILY
Status: DISCONTINUED | OUTPATIENT
Start: 2024-11-22 | End: 2024-11-25 | Stop reason: HOSPADM

## 2024-11-21 RX ORDER — CIPROFLOXACIN AND DEXAMETHASONE 3; 1 MG/ML; MG/ML
4 SUSPENSION/ DROPS AURICULAR (OTIC) 2 TIMES DAILY
Status: DISCONTINUED | OUTPATIENT
Start: 2024-11-21 | End: 2024-11-23

## 2024-11-21 RX ORDER — FUROSEMIDE 40 MG/1
40 TABLET ORAL ONCE
Status: DISCONTINUED | OUTPATIENT
Start: 2024-11-21 | End: 2024-11-23

## 2024-11-21 RX ORDER — BUDESONIDE 0.5 MG/2ML
0.5 INHALANT ORAL
Status: DISCONTINUED | OUTPATIENT
Start: 2024-11-21 | End: 2024-11-25 | Stop reason: HOSPADM

## 2024-11-21 RX ORDER — FUROSEMIDE 20 MG/1
20 TABLET ORAL ONCE
Status: DISCONTINUED | OUTPATIENT
Start: 2024-11-21 | End: 2024-11-21

## 2024-11-21 RX ORDER — ONDANSETRON 2 MG/ML
4 INJECTION INTRAMUSCULAR; INTRAVENOUS EVERY 6 HOURS PRN
Status: DISCONTINUED | OUTPATIENT
Start: 2024-11-21 | End: 2024-11-25 | Stop reason: HOSPADM

## 2024-11-21 RX ORDER — PANTOPRAZOLE SODIUM 40 MG/1
40 TABLET, DELAYED RELEASE ORAL 2 TIMES DAILY
Status: DISCONTINUED | OUTPATIENT
Start: 2024-11-21 | End: 2024-11-25 | Stop reason: HOSPADM

## 2024-11-21 RX ORDER — SODIUM CHLORIDE 9 MG/ML
INJECTION, SOLUTION INTRAVENOUS PRN
Status: DISCONTINUED | OUTPATIENT
Start: 2024-11-21 | End: 2024-11-25 | Stop reason: HOSPADM

## 2024-11-21 RX ORDER — ACETAMINOPHEN 325 MG/1
650 TABLET ORAL EVERY 6 HOURS PRN
Status: DISCONTINUED | OUTPATIENT
Start: 2024-11-21 | End: 2024-11-25 | Stop reason: HOSPADM

## 2024-11-21 RX ORDER — FUROSEMIDE 10 MG/ML
20 INJECTION INTRAMUSCULAR; INTRAVENOUS ONCE
Status: DISCONTINUED | OUTPATIENT
Start: 2024-11-21 | End: 2024-11-21

## 2024-11-21 RX ORDER — PREDNISONE 20 MG/1
60 TABLET ORAL ONCE
Status: DISCONTINUED | OUTPATIENT
Start: 2024-11-21 | End: 2024-11-21

## 2024-11-21 RX ORDER — ATORVASTATIN CALCIUM 40 MG/1
40 TABLET, FILM COATED ORAL NIGHTLY
Status: DISCONTINUED | OUTPATIENT
Start: 2024-11-21 | End: 2024-11-25 | Stop reason: HOSPADM

## 2024-11-21 RX ORDER — GUAIFENESIN 600 MG/1
600 TABLET, EXTENDED RELEASE ORAL DAILY
Status: DISCONTINUED | OUTPATIENT
Start: 2024-11-21 | End: 2024-11-25 | Stop reason: HOSPADM

## 2024-11-21 RX ORDER — ONDANSETRON 4 MG/1
4 TABLET, ORALLY DISINTEGRATING ORAL EVERY 8 HOURS PRN
Status: DISCONTINUED | OUTPATIENT
Start: 2024-11-21 | End: 2024-11-25 | Stop reason: HOSPADM

## 2024-11-21 RX ADMIN — METHYLPREDNISOLONE SODIUM SUCCINATE 125 MG: 125 INJECTION INTRAMUSCULAR; INTRAVENOUS at 16:26

## 2024-11-21 RX ADMIN — Medication 10 ML: at 20:32

## 2024-11-21 RX ADMIN — ATORVASTATIN CALCIUM 40 MG: 40 TABLET, FILM COATED ORAL at 20:31

## 2024-11-21 RX ADMIN — FUROSEMIDE 40 MG: 10 INJECTION, SOLUTION INTRAMUSCULAR; INTRAVENOUS at 16:25

## 2024-11-21 RX ADMIN — IPRATROPIUM BROMIDE AND ALBUTEROL SULFATE 1 DOSE: 2.5; .5 SOLUTION RESPIRATORY (INHALATION) at 16:03

## 2024-11-21 RX ADMIN — METOPROLOL TARTRATE 25 MG: 25 TABLET, FILM COATED ORAL at 22:57

## 2024-11-21 RX ADMIN — FLUTICASONE PROPIONATE 1 SPRAY: 50 SPRAY, METERED NASAL at 22:57

## 2024-11-21 RX ADMIN — GUAIFENESIN 600 MG: 600 TABLET ORAL at 20:31

## 2024-11-21 RX ADMIN — CIPROFLOXACIN AND DEXAMETHASONE 4 DROP: 3; 1 SUSPENSION/ DROPS AURICULAR (OTIC) at 22:57

## 2024-11-21 RX ADMIN — SODIUM CHLORIDE, PRESERVATIVE FREE 10 ML: 5 INJECTION INTRAVENOUS at 20:32

## 2024-11-21 RX ADMIN — BUDESONIDE 500 MCG: 0.5 SUSPENSION RESPIRATORY (INHALATION) at 20:56

## 2024-11-21 RX ADMIN — PANTOPRAZOLE SODIUM 40 MG: 40 TABLET, DELAYED RELEASE ORAL at 20:31

## 2024-11-21 RX ADMIN — APIXABAN 2.5 MG: 2.5 TABLET, FILM COATED ORAL at 20:31

## 2024-11-21 RX ADMIN — ESCITALOPRAM OXALATE 10 MG: 10 TABLET ORAL at 20:31

## 2024-11-21 RX ADMIN — IPRATROPIUM BROMIDE AND ALBUTEROL SULFATE 1 DOSE: 2.5; .5 SOLUTION RESPIRATORY (INHALATION) at 20:43

## 2024-11-21 ASSESSMENT — PAIN - FUNCTIONAL ASSESSMENT: PAIN_FUNCTIONAL_ASSESSMENT: 0-10

## 2024-11-21 ASSESSMENT — PAIN SCALES - GENERAL
PAINLEVEL_OUTOF10: 0
PAINLEVEL_OUTOF10: 0

## 2024-11-21 NOTE — ED NOTES
Terri Smith is a 84 y.o. female admitted for  Principal Problem:    Acute on chronic heart failure with normal ejection fraction (HCC)  Resolved Problems:    * No resolved hospital problems. *  .   Patient Home via EMS transportation with   Chief Complaint   Patient presents with    Chest Congestion     Patient wears 2.5 liters NC at baseline    Shortness of Breath     Patient reporting she was recently placed on antibiotics for her cough and chest congestion. States she never saw her PCP says \"They just called it in\" Was telling her home health nurse she was having a hard time breathing and she called 911.    .  Patient is alert and Person, Place, Time, and Situation  Patient's baseline mobility: Baseline Mobility: Walker  Code Status: Prior   Cardiac Rhythm: Cardiac Rhythm: Sinus rhythm  O2 Flow Rate (L/min): 2.5 L/min  Is patient on baseline Oxygen: yes,  how many Liters 2.5:   Abnormal Assessment Findings:     Isolation:       NIH Score:    C-SSRS: Risk of Suicide: No Risk  Bedside swallow:        Active LDA's:   Peripheral IV 11/21/24 Right Antecubital (Active)   Site Assessment Clean, dry & intact 11/21/24 1600   Line Status Blood return noted 11/21/24 1600   Line Care Connections checked and tightened 11/21/24 1600   Phlebitis Assessment No symptoms 11/21/24 1600   Infiltration Assessment 0 11/21/24 1600   Dressing Status New dressing applied 11/21/24 1600   Dressing Type Transparent 11/21/24 1600   Dressing Intervention New 11/21/24 1600     Patient admitted with a bond:  If the bond is chronic was it exchanged:  Reason for bond:   Patient admitted with Central Line:  . PICC line placement confirmed: YES OR NO:[id number]}   Reason for Central line:   Was central line Inserted from an outside facility:        Family/Caregiver Present yes Any Concerns: no   Restraints no  Sitter no         Vitals: MEWS Score: 2    Vitals:    11/21/24 1634 11/21/24 1721 11/21/24 1803 11/21/24 1818   BP:  122/69 117/67 (!)

## 2024-11-21 NOTE — ED PROVIDER NOTES
THIS IS MY KACI SUPERVISORY AND SHARED VISIT NOTE:    I personally saw the patient and made/approved the management plan and take responsibility for the patient management.    I independently performed a history and physical on Terri Smith.   All diagnostic, treatment, and disposition decisions were made by myself in conjunction with the advanced practice provider. I personally saw the patient and performed a substantive portion of the visit including all aspects of the medical decision making.      For further details of Terri Smith's emergency department encounter, please see VASHTI Lozano's documentation.    History: Patient is an 84-year-old female presenting today due to increasing chest congestion with shortness of breath since yesterday.  She normally wears oxygen at home.  She denies any chest pain.  She is on Eliquis and thinks she has been taking it.  She also reports recently being on some antibiotics for cough and chest congestion but no improvement.  Due to worsening shortness of breath, she came to the ED by EMS for further evaluation.  She is afraid to go home at this time based on how bad she feels.  She denies any abdominal pain or urinary complaints.  No reported fever.  No reported falls or trauma over the last few weeks when asking the patient.      Physical exam:   Gen: Mild acute distress.  Alert and answering questions appropriately.  Psych: Anxious mood and affect  HEENT: NCAT, dry MM  Neck: supple, normal range of motion, nontender to palpation  Cardiac: RRR, pulses 2+ in upper extremities  Lungs: Mild respiratory distress, scattered wheezing and rhonchi throughout lung fields, decreased breath sounds throughout lung fields  Abdomen: soft and nontender with no R/D/G  Neuro: Moving all extremities, GCS equals 15    The Ekg interpreted by me shows  Sinus rhythm with sinus arrhythmia noted  with a rate of 75  Axis is   Left axis deviation  QTc is  normal  Intervals and 
Ratio 1.2 1.1 - 2.2    Total Bilirubin <0.2 0.0 - 1.0 mg/dL    Alkaline Phosphatase 79 40 - 129 U/L    ALT 7 (L) 10 - 40 U/L    AST 18 15 - 37 U/L   Troponin   Result Value Ref Range    Troponin, High Sensitivity 61 (H) 0 - 14 ng/L   Brain Natriuretic Peptide   Result Value Ref Range    NT Pro-BNP 1,755 (H) 0 - 449 pg/mL   Urinalysis with Reflex to Culture    Specimen: Urine   Result Value Ref Range    Color, UA Yellow Straw/Yellow    Clarity, UA Clear Clear    Glucose, Ur Negative Negative mg/dL    Bilirubin, Urine Negative Negative    Ketones, Urine Negative Negative mg/dL    Specific Gravity, UA 1.010 1.005 - 1.030    Blood, Urine Negative Negative    pH, Urine 6.0 5.0 - 8.0    Protein, UA Negative Negative mg/dL    Urobilinogen, Urine 0.2 <2.0 E.U./dL    Nitrite, Urine Negative Negative    Leukocyte Esterase, Urine Negative Negative    Microscopic Examination Not Indicated     Urine Reflex to Culture Not Indicated    Troponin   Result Value Ref Range    Troponin, High Sensitivity 54 (H) 0 - 14 ng/L   Procalcitonin   Result Value Ref Range    Procalcitonin 0.08 0.00 - 0.15 ng/mL   Magnesium   Result Value Ref Range    Magnesium 2.34 1.80 - 2.40 mg/dL       I spoke with Dr. Lay. We discussed the history, physical exam, diagnostics, as well as their emergency department course. Based upon that discussion, we've decided to admit Terri Smith for further observation and evaluation of Terri Smith's   1. Acute on chronic congestive heart failure, unspecified heart failure type (HCC)    2. COPD exacerbation (HCC)    3. Elevated brain natriuretic peptide (BNP) level    4. Elevated troponin    .          I am the Primary Clinician of Record.    FINAL IMPRESSION      1. Acute on chronic congestive heart failure, unspecified heart failure type (HCC)    2. COPD exacerbation (HCC)    3. Elevated brain natriuretic peptide (BNP) level    4. Elevated troponin          DISPOSITION/PLAN     DISPOSITION Admitted

## 2024-11-21 NOTE — CARE COORDINATION
Received call from [facility], patient is active. Electronically signed by VANIA Fabian on 11/21/2024 at 3:03 PM

## 2024-11-21 NOTE — ED NOTES
Patient lying in bed. States that she feels that she is breathing better post breathing treatment and medications.

## 2024-11-21 NOTE — H&P
V2.0  History and Physical      Name:  Terir Smith /Age/Sex: 1940  (84 y.o. female)   MRN & CSN:  [id number] & [id number] Encounter Date/Time: 2024 6:12 PM EST   Location:   PCP: Jackson Ontiveros MD       Hospital Day: 1    Assessment and Plan:   Terri Smith is a 84 y.o. female with a pmh of COPD, HFpEF, CKD A-fib, hypertension, hyperlipidemia, anxiety/depression who presents with Acute on chronic heart failure with normal ejection fraction (HCC)    Hospital Problems             Last Modified POA    * (Principal) Acute on chronic heart failure with normal ejection fraction (HCC) 2024 Yes       Plan:  Acute on chronic heart failure with preserved ejection fraction.  With elevated BNP of 1700 and physical findings.  On torsemide at home, changed to IV Lasix 40 mg twice daily.  Strict I's and O's.  Low-sodium diet.  Heart failure nurse consulted.  COPD.  Stable.  Requiring 2 to 3 L O2 at home, no exacerbation noted on examination.  Chest x-ray without acute process noted.  Continue oxygen with O2 sat goal of 90 to 94%.  DuoNebs, budesonide, Roflumilast continue with admission.  Will discontinue oral corticosteroids with admission.  Hypertension.  Stable.  Continue home metoprolol.  A-fib.  Stable.  Continue home Eliquis for anticoagulation and metoprolol for rate control.  Hyperlipidemia.  Stable.  Continue home atorvastatin.  Anxiety/depression.  Stable.  Continue home BuSpar and Lexapro.  Chronic kidney disease.  Stable, renal dose medications, avoid nephrotoxic agents.  Elevated troponin.  Downtrending.  Likely secondary to CKD.  Patient without any signs of ACS.  Leukocytosis.  WBC of 15.2.  Likely secondary to recent corticosteroid use.  Procalcitonin 0.08, no concerns for infection at this time.  Will hold antibiotics with admission.    Disposition:   Current Living situation: Home  Expected Disposition: Home  Estimated D/C: 3 days    Diet No diet orders on file   DVT

## 2024-11-21 NOTE — ED NOTES
Pt passed swallow screening. Upon giving oral medications, pt took first two pills with no issues. Pt then requested tissues and threw up water along with meds.

## 2024-11-22 LAB
ALBUMIN SERPL-MCNC: 3.7 G/DL (ref 3.4–5)
ALP SERPL-CCNC: 74 U/L (ref 40–129)
ALT SERPL-CCNC: 7 U/L (ref 10–40)
ANION GAP SERPL CALCULATED.3IONS-SCNC: 14 MMOL/L (ref 3–16)
AST SERPL-CCNC: 12 U/L (ref 15–37)
BASOPHILS # BLD: 0 K/UL (ref 0–0.2)
BASOPHILS NFR BLD: 0.1 %
BILIRUB DIRECT SERPL-MCNC: <0.1 MG/DL (ref 0–0.3)
BILIRUB INDIRECT SERPL-MCNC: ABNORMAL MG/DL (ref 0–1)
BILIRUB SERPL-MCNC: <0.2 MG/DL (ref 0–1)
BUN SERPL-MCNC: 39 MG/DL (ref 7–20)
CALCIUM SERPL-MCNC: 10 MG/DL (ref 8.3–10.6)
CHLORIDE SERPL-SCNC: 99 MMOL/L (ref 99–110)
CHOLEST SERPL-MCNC: 187 MG/DL (ref 0–199)
CO2 SERPL-SCNC: 29 MMOL/L (ref 21–32)
CREAT SERPL-MCNC: 1.5 MG/DL (ref 0.6–1.2)
DEPRECATED RDW RBC AUTO: 13.7 % (ref 12.4–15.4)
EOSINOPHIL # BLD: 0 K/UL (ref 0–0.6)
EOSINOPHIL NFR BLD: 0 %
FERRITIN SERPL IA-MCNC: 55.8 NG/ML (ref 15–150)
GFR SERPLBLD CREATININE-BSD FMLA CKD-EPI: 34 ML/MIN/{1.73_M2}
GLUCOSE SERPL-MCNC: 152 MG/DL (ref 70–99)
HCT VFR BLD AUTO: 35.2 % (ref 36–48)
HDLC SERPL-MCNC: 76 MG/DL (ref 40–60)
HGB BLD-MCNC: 11.5 G/DL (ref 12–16)
IRON SATN MFR SERPL: 16 % (ref 15–50)
IRON SERPL-MCNC: 48 UG/DL (ref 37–145)
LDLC SERPL CALC-MCNC: 89 MG/DL
LYMPHOCYTES # BLD: 0.9 K/UL (ref 1–5.1)
LYMPHOCYTES NFR BLD: 10.6 %
MAGNESIUM SERPL-MCNC: 2.16 MG/DL (ref 1.8–2.4)
MCH RBC QN AUTO: 31 PG (ref 26–34)
MCHC RBC AUTO-ENTMCNC: 32.7 G/DL (ref 31–36)
MCV RBC AUTO: 95 FL (ref 80–100)
MONOCYTES # BLD: 0.1 K/UL (ref 0–1.3)
MONOCYTES NFR BLD: 1 %
NEUTROPHILS # BLD: 7.9 K/UL (ref 1.7–7.7)
NEUTROPHILS NFR BLD: 88.3 %
PLATELET # BLD AUTO: 286 K/UL (ref 135–450)
PMV BLD AUTO: 7.8 FL (ref 5–10.5)
POTASSIUM SERPL-SCNC: 4.8 MMOL/L (ref 3.5–5.1)
PROT SERPL-MCNC: 6.7 G/DL (ref 6.4–8.2)
RBC # BLD AUTO: 3.71 M/UL (ref 4–5.2)
SODIUM SERPL-SCNC: 142 MMOL/L (ref 136–145)
T3 SERPL-MCNC: 0.45 NG/ML (ref 0.8–2)
T4 FREE SERPL-MCNC: 1.2 NG/DL (ref 0.9–1.8)
TIBC SERPL-MCNC: 291 UG/DL (ref 260–445)
TRIGL SERPL-MCNC: 110 MG/DL (ref 0–150)
TSH SERPL DL<=0.005 MIU/L-ACNC: 0.26 UIU/ML (ref 0.27–4.2)
VLDLC SERPL CALC-MCNC: 22 MG/DL
WBC # BLD AUTO: 9 K/UL (ref 4–11)

## 2024-11-22 PROCEDURE — 80061 LIPID PANEL: CPT

## 2024-11-22 PROCEDURE — 83540 ASSAY OF IRON: CPT

## 2024-11-22 PROCEDURE — 83735 ASSAY OF MAGNESIUM: CPT

## 2024-11-22 PROCEDURE — 2700000000 HC OXYGEN THERAPY PER DAY

## 2024-11-22 PROCEDURE — 94761 N-INVAS EAR/PLS OXIMETRY MLT: CPT

## 2024-11-22 PROCEDURE — 2580000003 HC RX 258

## 2024-11-22 PROCEDURE — 1200000000 HC SEMI PRIVATE

## 2024-11-22 PROCEDURE — 6370000000 HC RX 637 (ALT 250 FOR IP)

## 2024-11-22 PROCEDURE — 85025 COMPLETE CBC W/AUTO DIFF WBC: CPT

## 2024-11-22 PROCEDURE — 6360000002 HC RX W HCPCS

## 2024-11-22 PROCEDURE — 80076 HEPATIC FUNCTION PANEL: CPT

## 2024-11-22 PROCEDURE — 80048 BASIC METABOLIC PNL TOTAL CA: CPT

## 2024-11-22 PROCEDURE — 84480 ASSAY TRIIODOTHYRONINE (T3): CPT

## 2024-11-22 PROCEDURE — 94640 AIRWAY INHALATION TREATMENT: CPT

## 2024-11-22 PROCEDURE — 82728 ASSAY OF FERRITIN: CPT

## 2024-11-22 PROCEDURE — 84443 ASSAY THYROID STIM HORMONE: CPT

## 2024-11-22 PROCEDURE — 84439 ASSAY OF FREE THYROXINE: CPT

## 2024-11-22 PROCEDURE — 83550 IRON BINDING TEST: CPT

## 2024-11-22 RX ORDER — FUROSEMIDE 10 MG/ML
40 INJECTION INTRAMUSCULAR; INTRAVENOUS DAILY
Status: DISCONTINUED | OUTPATIENT
Start: 2024-11-23 | End: 2024-11-23

## 2024-11-22 RX ADMIN — FLUTICASONE PROPIONATE 1 SPRAY: 50 SPRAY, METERED NASAL at 21:09

## 2024-11-22 RX ADMIN — CIPROFLOXACIN AND DEXAMETHASONE 4 DROP: 3; 1 SUSPENSION/ DROPS AURICULAR (OTIC) at 21:09

## 2024-11-22 RX ADMIN — APIXABAN 2.5 MG: 2.5 TABLET, FILM COATED ORAL at 08:53

## 2024-11-22 RX ADMIN — ESCITALOPRAM OXALATE 10 MG: 10 TABLET ORAL at 08:53

## 2024-11-22 RX ADMIN — IPRATROPIUM BROMIDE AND ALBUTEROL SULFATE 1 DOSE: 2.5; .5 SOLUTION RESPIRATORY (INHALATION) at 08:04

## 2024-11-22 RX ADMIN — IPRATROPIUM BROMIDE AND ALBUTEROL SULFATE 1 DOSE: 2.5; .5 SOLUTION RESPIRATORY (INHALATION) at 20:09

## 2024-11-22 RX ADMIN — GUAIFENESIN 600 MG: 600 TABLET ORAL at 08:53

## 2024-11-22 RX ADMIN — CIPROFLOXACIN AND DEXAMETHASONE 4 DROP: 3; 1 SUSPENSION/ DROPS AURICULAR (OTIC) at 08:57

## 2024-11-22 RX ADMIN — SODIUM CHLORIDE, PRESERVATIVE FREE 10 ML: 5 INJECTION INTRAVENOUS at 08:57

## 2024-11-22 RX ADMIN — IPRATROPIUM BROMIDE AND ALBUTEROL SULFATE 1 DOSE: 2.5; .5 SOLUTION RESPIRATORY (INHALATION) at 15:38

## 2024-11-22 RX ADMIN — BUDESONIDE 500 MCG: 0.5 SUSPENSION RESPIRATORY (INHALATION) at 20:09

## 2024-11-22 RX ADMIN — ROFLUMILAST 500 MCG: 500 TABLET ORAL at 08:53

## 2024-11-22 RX ADMIN — PANTOPRAZOLE SODIUM 40 MG: 40 TABLET, DELAYED RELEASE ORAL at 21:01

## 2024-11-22 RX ADMIN — IPRATROPIUM BROMIDE AND ALBUTEROL SULFATE 1 DOSE: 2.5; .5 SOLUTION RESPIRATORY (INHALATION) at 11:37

## 2024-11-22 RX ADMIN — BUSPIRONE HYDROCHLORIDE 10 MG: 5 TABLET ORAL at 18:50

## 2024-11-22 RX ADMIN — APIXABAN 2.5 MG: 2.5 TABLET, FILM COATED ORAL at 21:01

## 2024-11-22 RX ADMIN — ACETAMINOPHEN 650 MG: 325 TABLET ORAL at 08:53

## 2024-11-22 RX ADMIN — METOPROLOL TARTRATE 25 MG: 25 TABLET, FILM COATED ORAL at 08:53

## 2024-11-22 RX ADMIN — BUDESONIDE 500 MCG: 0.5 SUSPENSION RESPIRATORY (INHALATION) at 08:04

## 2024-11-22 RX ADMIN — PANTOPRAZOLE SODIUM 40 MG: 40 TABLET, DELAYED RELEASE ORAL at 08:53

## 2024-11-22 RX ADMIN — FUROSEMIDE 40 MG: 10 INJECTION, SOLUTION INTRAMUSCULAR; INTRAVENOUS at 08:53

## 2024-11-22 RX ADMIN — ATORVASTATIN CALCIUM 40 MG: 40 TABLET, FILM COATED ORAL at 21:01

## 2024-11-22 RX ADMIN — SODIUM CHLORIDE, PRESERVATIVE FREE 10 ML: 5 INJECTION INTRAVENOUS at 21:02

## 2024-11-22 RX ADMIN — BUSPIRONE HYDROCHLORIDE 10 MG: 5 TABLET ORAL at 06:29

## 2024-11-22 ASSESSMENT — PAIN DESCRIPTION - ORIENTATION: ORIENTATION: LEFT;RIGHT

## 2024-11-22 ASSESSMENT — PAIN DESCRIPTION - LOCATION: LOCATION: LEG

## 2024-11-22 ASSESSMENT — PAIN SCALES - GENERAL: PAINLEVEL_OUTOF10: 6

## 2024-11-22 NOTE — DISCHARGE INSTRUCTIONS
Take tablet torsemide 20 mg p.o. daily at home  Start taking potassium tablet 25 mEq at home for 4 days and get your K level rechecked  Follow-up with PCP in 1 week  Follow-up with cardiology  Continue DuoNeb and budesonide inhalers at home  Continue taking Roflumilast as advised      Heart Failure Resources:  Heart Failure Interactive Workbook:  Go to https://Kiio.WSO2/publication/?l=578090 for a Free Heart Failure Interactive Workbook provided by The American [employer] Association. This interactive workbook will provide information on Healthier Living with Heart Failure. Please copy and paste link into search bar. Use your mouse to scroll through the pages.    HF Valentine whitney:   Heart Failure Free smart phone whitney available for iPhone and Android download. Use your phone to track sodium intake, fluid intake, symptoms, and weight.     Low Sodium Diet / Recipes:  Go to www."[employer]Presents website for “renal” diet which is Low Sodium! "[employer] is a dialysis company, but this website offers free seasonal cookbooks. Each quarter, they will release 25-30 new recipes with a breakdown of calories, sodium, and glucose. You can also go to www.[employer]/recipes website for free recipes.     Discharge Instruction Video:  Scan the QR code below with your camera and click the canva.com link to open the video and watch educational information on Heart Failure and Medications from one of our nurses.   https://www.Aktino/design/DAFZnsH_JRk/7GhvxmzGQCDnhQKrtB2fkn/edit    Home Exercise Program:   Identification of Green/Yellow/Red zones:  You should be able to identify when you feel good (green zone), if you have 1-2 symptoms of HF (yellow zone), or if you are in need of medical attention (red zone).  In your CHF education folder you were provided a “stop light tool” to outline this information.     We want to you to rate your exertion levels:    Our therapy team has discussed means of identification with you such as the

## 2024-11-22 NOTE — CARE COORDINATION
Case Management Assessment  Initial Evaluation    Date/Time of Evaluation: 11/22/2024 9:41 AM  Assessment Completed by: Maryann Jacob RN    If patient is discharged prior to next notation, then this note serves as note for discharge by case management.    Patient Name: Terri Smith                   YOB: 1940  Diagnosis: Elevated troponin [R79.89]  COPD exacerbation (HCC) [J44.1]  Elevated brain natriuretic peptide (BNP) level [R79.89]  Acute on chronic heart failure with normal ejection fraction (HCC) [I50.33]  Acute on chronic congestive heart failure, unspecified heart failure type (HCC) [I50.9]                   Date / Time: 11/21/2024  2:10 PM    Patient Admission Status: Inpatient   Readmission Risk (Low < 19, Mod (19-27), High > 27): Readmission Risk Score: 30    Current PCP: Jackson Ontiveros MD  PCP verified by CM? Yes (Jackson Ontiveros MD)    Chart Reviewed: Yes      History Provided by: Patient, Medical Record  Patient Orientation: Alert and Oriented    Patient Cognition: Alert    Hospitalization in the last 30 days (Readmission):  No    If yes, Readmission Assessment in CM Navigator will be completed.    Advance Directives:      Code Status: Full Code   Patient's Primary Decision Maker is: Patient Declined (Legal Next of Kin Remains as Decision Maker)    Primary Decision Maker: LuisJuan - Child - [phone]    Secondary Decision Maker: WillieHafsa - Grandchild - [phone]    Secondary Decision Maker: Raphael Smith - Child - [phone]    Discharge Planning:    Patient lives with: Children (Son lives with her) Type of Home: House  Primary Care Giver: Family  Patient Support Systems include: Children, Family Members   Current Financial resources: Medicare, Other (Comment) ([facility] AARP)  Current community resources: ECF/Home Care ([facility])  Current services prior to admission: Durable Medical Equipment, C-pap, Home Care, Oxygen Therapy (Home care

## 2024-11-23 ENCOUNTER — APPOINTMENT (OUTPATIENT)
Dept: GENERAL RADIOLOGY | Age: 84
DRG: 291 | End: 2024-11-23
Payer: MEDICARE

## 2024-11-23 LAB
ANION GAP SERPL CALCULATED.3IONS-SCNC: 12 MMOL/L (ref 3–16)
BASOPHILS # BLD: 0 K/UL (ref 0–0.2)
BASOPHILS NFR BLD: 0.1 %
BUN SERPL-MCNC: 44 MG/DL (ref 7–20)
CALCIUM SERPL-MCNC: 9.3 MG/DL (ref 8.3–10.6)
CHLORIDE SERPL-SCNC: 101 MMOL/L (ref 99–110)
CO2 SERPL-SCNC: 30 MMOL/L (ref 21–32)
CREAT SERPL-MCNC: 1.4 MG/DL (ref 0.6–1.2)
DEPRECATED RDW RBC AUTO: 14.1 % (ref 12.4–15.4)
EOSINOPHIL # BLD: 0 K/UL (ref 0–0.6)
EOSINOPHIL NFR BLD: 0.2 %
GFR SERPLBLD CREATININE-BSD FMLA CKD-EPI: 37 ML/MIN/{1.73_M2}
GLUCOSE SERPL-MCNC: 94 MG/DL (ref 70–99)
HCT VFR BLD AUTO: 35.2 % (ref 36–48)
HGB BLD-MCNC: 10.9 G/DL (ref 12–16)
LYMPHOCYTES # BLD: 1.9 K/UL (ref 1–5.1)
LYMPHOCYTES NFR BLD: 16.8 %
MAGNESIUM SERPL-MCNC: 2.14 MG/DL (ref 1.8–2.4)
MCH RBC QN AUTO: 29.6 PG (ref 26–34)
MCHC RBC AUTO-ENTMCNC: 31 G/DL (ref 31–36)
MCV RBC AUTO: 95.4 FL (ref 80–100)
MONOCYTES # BLD: 0.9 K/UL (ref 0–1.3)
MONOCYTES NFR BLD: 7.5 %
NEUTROPHILS # BLD: 8.6 K/UL (ref 1.7–7.7)
NEUTROPHILS NFR BLD: 75.4 %
NT-PROBNP SERPL-MCNC: 918 PG/ML (ref 0–449)
PLATELET # BLD AUTO: 258 K/UL (ref 135–450)
PMV BLD AUTO: 7.8 FL (ref 5–10.5)
POTASSIUM SERPL-SCNC: 3.8 MMOL/L (ref 3.5–5.1)
RBC # BLD AUTO: 3.68 M/UL (ref 4–5.2)
SODIUM SERPL-SCNC: 143 MMOL/L (ref 136–145)
WBC # BLD AUTO: 11.4 K/UL (ref 4–11)

## 2024-11-23 PROCEDURE — 71046 X-RAY EXAM CHEST 2 VIEWS: CPT

## 2024-11-23 PROCEDURE — 6360000002 HC RX W HCPCS

## 2024-11-23 PROCEDURE — 2580000003 HC RX 258

## 2024-11-23 PROCEDURE — 83735 ASSAY OF MAGNESIUM: CPT

## 2024-11-23 PROCEDURE — 6370000000 HC RX 637 (ALT 250 FOR IP)

## 2024-11-23 PROCEDURE — 83880 ASSAY OF NATRIURETIC PEPTIDE: CPT

## 2024-11-23 PROCEDURE — 94761 N-INVAS EAR/PLS OXIMETRY MLT: CPT

## 2024-11-23 PROCEDURE — 85025 COMPLETE CBC W/AUTO DIFF WBC: CPT

## 2024-11-23 PROCEDURE — 80048 BASIC METABOLIC PNL TOTAL CA: CPT

## 2024-11-23 PROCEDURE — 94640 AIRWAY INHALATION TREATMENT: CPT

## 2024-11-23 PROCEDURE — 36415 COLL VENOUS BLD VENIPUNCTURE: CPT

## 2024-11-23 PROCEDURE — 2700000000 HC OXYGEN THERAPY PER DAY

## 2024-11-23 PROCEDURE — 1200000000 HC SEMI PRIVATE

## 2024-11-23 RX ORDER — FUROSEMIDE 40 MG/1
40 TABLET ORAL 2 TIMES DAILY
Status: COMPLETED | OUTPATIENT
Start: 2024-11-23 | End: 2024-11-24

## 2024-11-23 RX ORDER — CIPROFLOXACIN AND DEXAMETHASONE 3; 1 MG/ML; MG/ML
4 SUSPENSION/ DROPS AURICULAR (OTIC) 2 TIMES DAILY
Status: DISCONTINUED | OUTPATIENT
Start: 2024-11-23 | End: 2024-11-25 | Stop reason: HOSPADM

## 2024-11-23 RX ORDER — LEVOTHYROXINE SODIUM 25 UG/1
25 TABLET ORAL DAILY
Status: DISCONTINUED | OUTPATIENT
Start: 2024-11-23 | End: 2024-11-25 | Stop reason: HOSPADM

## 2024-11-23 RX ADMIN — BUSPIRONE HYDROCHLORIDE 10 MG: 5 TABLET ORAL at 17:48

## 2024-11-23 RX ADMIN — IPRATROPIUM BROMIDE AND ALBUTEROL SULFATE 1 DOSE: 2.5; .5 SOLUTION RESPIRATORY (INHALATION) at 19:50

## 2024-11-23 RX ADMIN — BUSPIRONE HYDROCHLORIDE 10 MG: 5 TABLET ORAL at 09:11

## 2024-11-23 RX ADMIN — BUSPIRONE HYDROCHLORIDE 10 MG: 5 TABLET ORAL at 02:46

## 2024-11-23 RX ADMIN — APIXABAN 2.5 MG: 2.5 TABLET, FILM COATED ORAL at 21:45

## 2024-11-23 RX ADMIN — ROFLUMILAST 500 MCG: 500 TABLET ORAL at 09:13

## 2024-11-23 RX ADMIN — APIXABAN 2.5 MG: 2.5 TABLET, FILM COATED ORAL at 09:11

## 2024-11-23 RX ADMIN — IPRATROPIUM BROMIDE AND ALBUTEROL SULFATE 1 DOSE: 2.5; .5 SOLUTION RESPIRATORY (INHALATION) at 23:44

## 2024-11-23 RX ADMIN — ESCITALOPRAM OXALATE 10 MG: 10 TABLET ORAL at 09:11

## 2024-11-23 RX ADMIN — GUAIFENESIN 600 MG: 600 TABLET ORAL at 09:11

## 2024-11-23 RX ADMIN — BUDESONIDE 500 MCG: 0.5 SUSPENSION RESPIRATORY (INHALATION) at 19:50

## 2024-11-23 RX ADMIN — Medication 10 ML: at 02:51

## 2024-11-23 RX ADMIN — ONDANSETRON 4 MG: 2 INJECTION INTRAMUSCULAR; INTRAVENOUS at 02:50

## 2024-11-23 RX ADMIN — METOPROLOL TARTRATE 25 MG: 25 TABLET, FILM COATED ORAL at 09:11

## 2024-11-23 RX ADMIN — LEVOTHYROXINE SODIUM 25 MCG: 0.03 TABLET ORAL at 14:36

## 2024-11-23 RX ADMIN — ACETAMINOPHEN 650 MG: 325 TABLET ORAL at 09:11

## 2024-11-23 RX ADMIN — FUROSEMIDE 40 MG: 40 TABLET ORAL at 14:36

## 2024-11-23 RX ADMIN — ACETAMINOPHEN 650 MG: 325 TABLET ORAL at 16:55

## 2024-11-23 RX ADMIN — SODIUM CHLORIDE, PRESERVATIVE FREE 10 ML: 5 INJECTION INTRAVENOUS at 21:45

## 2024-11-23 RX ADMIN — FUROSEMIDE 40 MG: 40 TABLET ORAL at 16:56

## 2024-11-23 RX ADMIN — FUROSEMIDE 40 MG: 10 INJECTION, SOLUTION INTRAMUSCULAR; INTRAVENOUS at 09:09

## 2024-11-23 RX ADMIN — IPRATROPIUM BROMIDE AND ALBUTEROL SULFATE 1 DOSE: 2.5; .5 SOLUTION RESPIRATORY (INHALATION) at 07:56

## 2024-11-23 RX ADMIN — METOPROLOL TARTRATE 25 MG: 25 TABLET, FILM COATED ORAL at 21:45

## 2024-11-23 RX ADMIN — BUDESONIDE 500 MCG: 0.5 SUSPENSION RESPIRATORY (INHALATION) at 07:56

## 2024-11-23 RX ADMIN — CIPROFLOXACIN AND DEXAMETHASONE 4 DROP: 3; 1 SUSPENSION/ DROPS AURICULAR (OTIC) at 21:45

## 2024-11-23 RX ADMIN — PANTOPRAZOLE SODIUM 40 MG: 40 TABLET, DELAYED RELEASE ORAL at 21:45

## 2024-11-23 RX ADMIN — PANTOPRAZOLE SODIUM 40 MG: 40 TABLET, DELAYED RELEASE ORAL at 09:11

## 2024-11-23 RX ADMIN — ATORVASTATIN CALCIUM 40 MG: 40 TABLET, FILM COATED ORAL at 21:45

## 2024-11-23 ASSESSMENT — PAIN SCALES - GENERAL
PAINLEVEL_OUTOF10: 5
PAINLEVEL_OUTOF10: 4

## 2024-11-23 ASSESSMENT — PAIN DESCRIPTION - LOCATION: LOCATION: HEAD

## 2024-11-24 LAB
ANION GAP SERPL CALCULATED.3IONS-SCNC: 15 MMOL/L (ref 3–16)
BASOPHILS # BLD: 0 K/UL (ref 0–0.2)
BASOPHILS NFR BLD: 0.2 %
BUN SERPL-MCNC: 42 MG/DL (ref 7–20)
CALCIUM SERPL-MCNC: 9.4 MG/DL (ref 8.3–10.6)
CHLORIDE SERPL-SCNC: 97 MMOL/L (ref 99–110)
CO2 SERPL-SCNC: 31 MMOL/L (ref 21–32)
CREAT SERPL-MCNC: 1.5 MG/DL (ref 0.6–1.2)
DEPRECATED RDW RBC AUTO: 14 % (ref 12.4–15.4)
EOSINOPHIL # BLD: 0.1 K/UL (ref 0–0.6)
EOSINOPHIL NFR BLD: 0.8 %
GFR SERPLBLD CREATININE-BSD FMLA CKD-EPI: 34 ML/MIN/{1.73_M2}
GLUCOSE SERPL-MCNC: 118 MG/DL (ref 70–99)
HCT VFR BLD AUTO: 36.4 % (ref 36–48)
HGB BLD-MCNC: 11.5 G/DL (ref 12–16)
LYMPHOCYTES # BLD: 1.7 K/UL (ref 1–5.1)
LYMPHOCYTES NFR BLD: 15.3 %
MAGNESIUM SERPL-MCNC: 1.98 MG/DL (ref 1.8–2.4)
MCH RBC QN AUTO: 30 PG (ref 26–34)
MCHC RBC AUTO-ENTMCNC: 31.7 G/DL (ref 31–36)
MCV RBC AUTO: 94.8 FL (ref 80–100)
MONOCYTES # BLD: 0.7 K/UL (ref 0–1.3)
MONOCYTES NFR BLD: 6.7 %
NEUTROPHILS # BLD: 8.4 K/UL (ref 1.7–7.7)
NEUTROPHILS NFR BLD: 77 %
PLATELET # BLD AUTO: 259 K/UL (ref 135–450)
PMV BLD AUTO: 7.8 FL (ref 5–10.5)
POTASSIUM SERPL-SCNC: 3.3 MMOL/L (ref 3.5–5.1)
RBC # BLD AUTO: 3.84 M/UL (ref 4–5.2)
SODIUM SERPL-SCNC: 143 MMOL/L (ref 136–145)
WBC # BLD AUTO: 10.9 K/UL (ref 4–11)

## 2024-11-24 PROCEDURE — 80048 BASIC METABOLIC PNL TOTAL CA: CPT

## 2024-11-24 PROCEDURE — 94640 AIRWAY INHALATION TREATMENT: CPT

## 2024-11-24 PROCEDURE — 2580000003 HC RX 258

## 2024-11-24 PROCEDURE — 85025 COMPLETE CBC W/AUTO DIFF WBC: CPT

## 2024-11-24 PROCEDURE — 6370000000 HC RX 637 (ALT 250 FOR IP)

## 2024-11-24 PROCEDURE — 83735 ASSAY OF MAGNESIUM: CPT

## 2024-11-24 PROCEDURE — 94761 N-INVAS EAR/PLS OXIMETRY MLT: CPT

## 2024-11-24 PROCEDURE — 36415 COLL VENOUS BLD VENIPUNCTURE: CPT

## 2024-11-24 PROCEDURE — 6370000000 HC RX 637 (ALT 250 FOR IP): Performed by: STUDENT IN AN ORGANIZED HEALTH CARE EDUCATION/TRAINING PROGRAM

## 2024-11-24 PROCEDURE — 6360000002 HC RX W HCPCS

## 2024-11-24 PROCEDURE — 1200000000 HC SEMI PRIVATE

## 2024-11-24 PROCEDURE — 2700000000 HC OXYGEN THERAPY PER DAY

## 2024-11-24 PROCEDURE — 6370000000 HC RX 637 (ALT 250 FOR IP): Performed by: NURSE PRACTITIONER

## 2024-11-24 RX ORDER — GABAPENTIN 100 MG/1
100 CAPSULE ORAL 3 TIMES DAILY
Status: DISCONTINUED | OUTPATIENT
Start: 2024-11-24 | End: 2024-11-25 | Stop reason: HOSPADM

## 2024-11-24 RX ORDER — FUROSEMIDE 40 MG/1
40 TABLET ORAL 2 TIMES DAILY
Status: DISCONTINUED | OUTPATIENT
Start: 2024-11-24 | End: 2024-11-25 | Stop reason: HOSPADM

## 2024-11-24 RX ADMIN — LEVOTHYROXINE SODIUM 25 MCG: 0.03 TABLET ORAL at 05:53

## 2024-11-24 RX ADMIN — FLUTICASONE PROPIONATE 1 SPRAY: 50 SPRAY, METERED NASAL at 08:48

## 2024-11-24 RX ADMIN — FUROSEMIDE 40 MG: 40 TABLET ORAL at 17:36

## 2024-11-24 RX ADMIN — ROFLUMILAST 500 MCG: 500 TABLET ORAL at 08:49

## 2024-11-24 RX ADMIN — BUSPIRONE HYDROCHLORIDE 10 MG: 5 TABLET ORAL at 19:51

## 2024-11-24 RX ADMIN — SODIUM CHLORIDE, PRESERVATIVE FREE 10 ML: 5 INJECTION INTRAVENOUS at 08:49

## 2024-11-24 RX ADMIN — GUAIFENESIN 600 MG: 600 TABLET ORAL at 08:50

## 2024-11-24 RX ADMIN — PANTOPRAZOLE SODIUM 40 MG: 40 TABLET, DELAYED RELEASE ORAL at 08:50

## 2024-11-24 RX ADMIN — IPRATROPIUM BROMIDE AND ALBUTEROL SULFATE 1 DOSE: 2.5; .5 SOLUTION RESPIRATORY (INHALATION) at 11:41

## 2024-11-24 RX ADMIN — ACETAMINOPHEN 650 MG: 325 TABLET ORAL at 05:53

## 2024-11-24 RX ADMIN — IPRATROPIUM BROMIDE AND ALBUTEROL SULFATE 1 DOSE: 2.5; .5 SOLUTION RESPIRATORY (INHALATION) at 15:58

## 2024-11-24 RX ADMIN — ATORVASTATIN CALCIUM 40 MG: 40 TABLET, FILM COATED ORAL at 19:51

## 2024-11-24 RX ADMIN — BUDESONIDE 500 MCG: 0.5 SUSPENSION RESPIRATORY (INHALATION) at 19:39

## 2024-11-24 RX ADMIN — ESCITALOPRAM OXALATE 10 MG: 10 TABLET ORAL at 08:49

## 2024-11-24 RX ADMIN — CIPROFLOXACIN AND DEXAMETHASONE 4 DROP: 3; 1 SUSPENSION/ DROPS AURICULAR (OTIC) at 08:48

## 2024-11-24 RX ADMIN — IPRATROPIUM BROMIDE AND ALBUTEROL SULFATE 1 DOSE: 2.5; .5 SOLUTION RESPIRATORY (INHALATION) at 23:33

## 2024-11-24 RX ADMIN — IPRATROPIUM BROMIDE AND ALBUTEROL SULFATE 1 DOSE: 2.5; .5 SOLUTION RESPIRATORY (INHALATION) at 08:05

## 2024-11-24 RX ADMIN — METOPROLOL TARTRATE 25 MG: 25 TABLET, FILM COATED ORAL at 19:51

## 2024-11-24 RX ADMIN — APIXABAN 2.5 MG: 2.5 TABLET, FILM COATED ORAL at 08:49

## 2024-11-24 RX ADMIN — BUSPIRONE HYDROCHLORIDE 10 MG: 5 TABLET ORAL at 14:43

## 2024-11-24 RX ADMIN — BUSPIRONE HYDROCHLORIDE 10 MG: 5 TABLET ORAL at 08:49

## 2024-11-24 RX ADMIN — IPRATROPIUM BROMIDE AND ALBUTEROL SULFATE 1 DOSE: 2.5; .5 SOLUTION RESPIRATORY (INHALATION) at 03:41

## 2024-11-24 RX ADMIN — PANTOPRAZOLE SODIUM 40 MG: 40 TABLET, DELAYED RELEASE ORAL at 19:51

## 2024-11-24 RX ADMIN — IPRATROPIUM BROMIDE AND ALBUTEROL SULFATE 1 DOSE: 2.5; .5 SOLUTION RESPIRATORY (INHALATION) at 19:34

## 2024-11-24 RX ADMIN — GABAPENTIN 100 MG: 100 CAPSULE ORAL at 20:48

## 2024-11-24 RX ADMIN — CIPROFLOXACIN AND DEXAMETHASONE 4 DROP: 3; 1 SUSPENSION/ DROPS AURICULAR (OTIC) at 19:54

## 2024-11-24 RX ADMIN — FUROSEMIDE 40 MG: 40 TABLET ORAL at 08:49

## 2024-11-24 RX ADMIN — SODIUM CHLORIDE, PRESERVATIVE FREE 10 ML: 5 INJECTION INTRAVENOUS at 19:53

## 2024-11-24 RX ADMIN — METOPROLOL TARTRATE 25 MG: 25 TABLET, FILM COATED ORAL at 08:49

## 2024-11-24 RX ADMIN — APIXABAN 2.5 MG: 2.5 TABLET, FILM COATED ORAL at 19:51

## 2024-11-24 RX ADMIN — ACETAMINOPHEN 650 MG: 325 TABLET ORAL at 13:58

## 2024-11-24 ASSESSMENT — PAIN SCALES - GENERAL
PAINLEVEL_OUTOF10: 9
PAINLEVEL_OUTOF10: 9
PAINLEVEL_OUTOF10: 3

## 2024-11-24 ASSESSMENT — PAIN DESCRIPTION - LOCATION
LOCATION: LEG;BACK
LOCATION: LEG
LOCATION: HEAD

## 2024-11-24 ASSESSMENT — PAIN DESCRIPTION - ORIENTATION
ORIENTATION: LEFT;RIGHT
ORIENTATION: LEFT;RIGHT;LOWER

## 2024-11-24 ASSESSMENT — PAIN DESCRIPTION - DESCRIPTORS
DESCRIPTORS: ACHING
DESCRIPTORS: ACHING;TINGLING

## 2024-11-24 NOTE — RT PROTOCOL NOTE
4 hours PRN for wheezing or increased work of breathing using Per Protocol order mode.        4-6 - enter or revise RT Bronchodilator order(s) to two equivalent RT bronchodilator orders with one order with BID Frequency and one order with Frequency of every 4 hours PRN wheezing or increased work of breathing using Per Protocol order mode.        7-10 - enter or revise RT Bronchodilator order(s) to two equivalent RT bronchodilator orders with one order with TID Frequency and one order with Frequency of every 4 hours PRN wheezing or increased work of breathing using Per Protocol order mode.       11-13 - enter or revise RT Bronchodilator order(s) to one equivalent RT bronchodilator order with QID Frequency and an Albuterol order with Frequency of every 4 hours PRN wheezing or increased work of breathing using Per Protocol order mode.      Greater than 13 - enter or revise RT Bronchodilator order(s) to one equivalent RT bronchodilator order with every 4 hours Frequency and an Albuterol order with Frequency of every 2 hours PRN wheezing or increased work of breathing using Per Protocol order mode.     RT to enter RT Home Evaluation for COPD & MDI Assessment order using Per Protocol order mode.    Electronically signed by Kailyn Pike RCP on 11/24/2024 at 6:51 PM

## 2024-11-25 VITALS
RESPIRATION RATE: 20 BRPM | TEMPERATURE: 97.5 F | HEART RATE: 97 BPM | OXYGEN SATURATION: 100 % | WEIGHT: 102.8 LBS | DIASTOLIC BLOOD PRESSURE: 68 MMHG | BODY MASS INDEX: 17.55 KG/M2 | SYSTOLIC BLOOD PRESSURE: 123 MMHG | HEIGHT: 64 IN

## 2024-11-25 LAB
ALBUMIN SERPL-MCNC: 3.5 G/DL (ref 3.4–5)
ALBUMIN/GLOB SERPL: 1.1 {RATIO} (ref 1.1–2.2)
ALP SERPL-CCNC: 74 U/L (ref 40–129)
ALT SERPL-CCNC: <5 U/L (ref 10–40)
ANION GAP SERPL CALCULATED.3IONS-SCNC: 12 MMOL/L (ref 3–16)
ANION GAP SERPL CALCULATED.3IONS-SCNC: 16 MMOL/L (ref 3–16)
AST SERPL-CCNC: 18 U/L (ref 15–37)
BILIRUB SERPL-MCNC: 0.4 MG/DL (ref 0–1)
BUN SERPL-MCNC: 35 MG/DL (ref 7–20)
BUN SERPL-MCNC: 35 MG/DL (ref 7–20)
CALCIUM SERPL-MCNC: 10.1 MG/DL (ref 8.3–10.6)
CALCIUM SERPL-MCNC: 9.4 MG/DL (ref 8.3–10.6)
CHLORIDE SERPL-SCNC: 98 MMOL/L (ref 99–110)
CHLORIDE SERPL-SCNC: 98 MMOL/L (ref 99–110)
CO2 SERPL-SCNC: 28 MMOL/L (ref 21–32)
CO2 SERPL-SCNC: 33 MMOL/L (ref 21–32)
CREAT SERPL-MCNC: 1.5 MG/DL (ref 0.6–1.2)
CREAT SERPL-MCNC: 1.6 MG/DL (ref 0.6–1.2)
GFR SERPLBLD CREATININE-BSD FMLA CKD-EPI: 32 ML/MIN/{1.73_M2}
GFR SERPLBLD CREATININE-BSD FMLA CKD-EPI: 34 ML/MIN/{1.73_M2}
GLUCOSE SERPL-MCNC: 127 MG/DL (ref 70–99)
GLUCOSE SERPL-MCNC: 87 MG/DL (ref 70–99)
MAGNESIUM SERPL-MCNC: 1.94 MG/DL (ref 1.8–2.4)
NT-PROBNP SERPL-MCNC: 638 PG/ML (ref 0–449)
POTASSIUM SERPL-SCNC: 3.1 MMOL/L (ref 3.5–5.1)
POTASSIUM SERPL-SCNC: 4.4 MMOL/L (ref 3.5–5.1)
PROT SERPL-MCNC: 6.7 G/DL (ref 6.4–8.2)
SODIUM SERPL-SCNC: 142 MMOL/L (ref 136–145)
SODIUM SERPL-SCNC: 143 MMOL/L (ref 136–145)

## 2024-11-25 PROCEDURE — 36415 COLL VENOUS BLD VENIPUNCTURE: CPT

## 2024-11-25 PROCEDURE — 83735 ASSAY OF MAGNESIUM: CPT

## 2024-11-25 PROCEDURE — 94640 AIRWAY INHALATION TREATMENT: CPT

## 2024-11-25 PROCEDURE — 6370000000 HC RX 637 (ALT 250 FOR IP)

## 2024-11-25 PROCEDURE — 97110 THERAPEUTIC EXERCISES: CPT

## 2024-11-25 PROCEDURE — 6370000000 HC RX 637 (ALT 250 FOR IP): Performed by: STUDENT IN AN ORGANIZED HEALTH CARE EDUCATION/TRAINING PROGRAM

## 2024-11-25 PROCEDURE — 97116 GAIT TRAINING THERAPY: CPT

## 2024-11-25 PROCEDURE — 2580000003 HC RX 258

## 2024-11-25 PROCEDURE — 94761 N-INVAS EAR/PLS OXIMETRY MLT: CPT

## 2024-11-25 PROCEDURE — 6360000002 HC RX W HCPCS

## 2024-11-25 PROCEDURE — 97166 OT EVAL MOD COMPLEX 45 MIN: CPT

## 2024-11-25 PROCEDURE — 80053 COMPREHEN METABOLIC PANEL: CPT

## 2024-11-25 PROCEDURE — 97162 PT EVAL MOD COMPLEX 30 MIN: CPT

## 2024-11-25 PROCEDURE — 6370000000 HC RX 637 (ALT 250 FOR IP): Performed by: NURSE PRACTITIONER

## 2024-11-25 PROCEDURE — 97530 THERAPEUTIC ACTIVITIES: CPT

## 2024-11-25 PROCEDURE — 83880 ASSAY OF NATRIURETIC PEPTIDE: CPT

## 2024-11-25 PROCEDURE — 2700000000 HC OXYGEN THERAPY PER DAY

## 2024-11-25 RX ORDER — FLUTICASONE PROPIONATE 50 MCG
1 SPRAY, SUSPENSION (ML) NASAL DAILY PRN
Qty: 16 G | Refills: 3 | Status: ON HOLD | OUTPATIENT
Start: 2024-11-25

## 2024-11-25 RX ORDER — PANTOPRAZOLE SODIUM 40 MG/1
40 TABLET, DELAYED RELEASE ORAL 2 TIMES DAILY
Qty: 30 TABLET | Refills: 3 | Status: ON HOLD | OUTPATIENT
Start: 2024-11-25

## 2024-11-25 RX ORDER — LANOLIN ALCOHOL/MO/W.PET/CERES
400 CREAM (GRAM) TOPICAL DAILY
Qty: 30 TABLET | Refills: 0 | Status: ON HOLD | OUTPATIENT
Start: 2024-11-25 | End: 2024-12-25

## 2024-11-25 RX ORDER — GABAPENTIN 100 MG/1
100 CAPSULE ORAL 3 TIMES DAILY
Qty: 90 CAPSULE | Refills: 0 | Status: ON HOLD | OUTPATIENT
Start: 2024-11-25 | End: 2024-12-25

## 2024-11-25 RX ORDER — BUDESONIDE 0.5 MG/2ML
0.5 INHALANT ORAL
Qty: 60 EACH | Refills: 3 | Status: ON HOLD | OUTPATIENT
Start: 2024-11-25

## 2024-11-25 RX ORDER — ESCITALOPRAM OXALATE 10 MG/1
10 TABLET ORAL DAILY
Qty: 30 TABLET | Refills: 3 | Status: ON HOLD | OUTPATIENT
Start: 2024-11-26

## 2024-11-25 RX ORDER — GUAIFENESIN 600 MG/1
600 TABLET, EXTENDED RELEASE ORAL DAILY
Qty: 30 TABLET | Refills: 0 | Status: ON HOLD | OUTPATIENT
Start: 2024-11-26 | End: 2024-12-26

## 2024-11-25 RX ORDER — POTASSIUM CHLORIDE 750 MG/1
20 TABLET, EXTENDED RELEASE ORAL DAILY
Qty: 10 TABLET | Refills: 0 | Status: ON HOLD | OUTPATIENT
Start: 2024-11-25 | End: 2024-12-01

## 2024-11-25 RX ORDER — POTASSIUM CHLORIDE 1500 MG/1
40 TABLET, EXTENDED RELEASE ORAL ONCE
Status: COMPLETED | OUTPATIENT
Start: 2024-11-25 | End: 2024-11-25

## 2024-11-25 RX ORDER — OFLOXACIN 3 MG/ML
5 SOLUTION AURICULAR (OTIC) 2 TIMES DAILY
Qty: 5 ML | Refills: 0 | Status: ON HOLD | OUTPATIENT
Start: 2024-11-25 | End: 2024-12-05

## 2024-11-25 RX ORDER — LEVOTHYROXINE SODIUM 25 UG/1
25 TABLET ORAL DAILY
Qty: 30 TABLET | Refills: 3 | Status: ON HOLD | OUTPATIENT
Start: 2024-11-26 | End: 2025-03-26

## 2024-11-25 RX ORDER — CIPROFLOXACIN AND DEXAMETHASONE 3; 1 MG/ML; MG/ML
4 SUSPENSION/ DROPS AURICULAR (OTIC) 2 TIMES DAILY
Qty: 1 EACH | Refills: 0 | Status: SHIPPED | OUTPATIENT
Start: 2024-11-25 | End: 2024-11-25 | Stop reason: HOSPADM

## 2024-11-25 RX ORDER — REGADENOSON 0.08 MG/ML
0.4 INJECTION, SOLUTION INTRAVENOUS
Status: CANCELLED | OUTPATIENT
Start: 2024-11-25

## 2024-11-25 RX ORDER — IPRATROPIUM BROMIDE AND ALBUTEROL SULFATE 2.5; .5 MG/3ML; MG/3ML
3 SOLUTION RESPIRATORY (INHALATION)
Qty: 40 EACH | Refills: 0 | Status: SHIPPED | OUTPATIENT
Start: 2024-11-25 | End: 2024-11-25 | Stop reason: HOSPADM

## 2024-11-25 RX ADMIN — ACETAMINOPHEN 650 MG: 325 TABLET ORAL at 07:32

## 2024-11-25 RX ADMIN — IPRATROPIUM BROMIDE AND ALBUTEROL SULFATE 1 DOSE: 2.5; .5 SOLUTION RESPIRATORY (INHALATION) at 08:49

## 2024-11-25 RX ADMIN — APIXABAN 2.5 MG: 2.5 TABLET, FILM COATED ORAL at 09:17

## 2024-11-25 RX ADMIN — GABAPENTIN 100 MG: 100 CAPSULE ORAL at 14:32

## 2024-11-25 RX ADMIN — DIPHENHYDRAMINE HYDROCHLORIDE 5 ML: 12.5 SOLUTION ORAL at 05:01

## 2024-11-25 RX ADMIN — GABAPENTIN 100 MG: 100 CAPSULE ORAL at 09:17

## 2024-11-25 RX ADMIN — BUSPIRONE HYDROCHLORIDE 10 MG: 5 TABLET ORAL at 12:10

## 2024-11-25 RX ADMIN — METOPROLOL TARTRATE 25 MG: 25 TABLET, FILM COATED ORAL at 09:17

## 2024-11-25 RX ADMIN — GUAIFENESIN 600 MG: 600 TABLET ORAL at 09:17

## 2024-11-25 RX ADMIN — PANTOPRAZOLE SODIUM 40 MG: 40 TABLET, DELAYED RELEASE ORAL at 09:17

## 2024-11-25 RX ADMIN — POTASSIUM CHLORIDE 40 MEQ: 1500 TABLET, EXTENDED RELEASE ORAL at 09:17

## 2024-11-25 RX ADMIN — IPRATROPIUM BROMIDE AND ALBUTEROL SULFATE 1 DOSE: 2.5; .5 SOLUTION RESPIRATORY (INHALATION) at 03:29

## 2024-11-25 RX ADMIN — FUROSEMIDE 40 MG: 40 TABLET ORAL at 09:17

## 2024-11-25 RX ADMIN — ESCITALOPRAM OXALATE 10 MG: 10 TABLET ORAL at 09:17

## 2024-11-25 RX ADMIN — SODIUM CHLORIDE, PRESERVATIVE FREE 10 ML: 5 INJECTION INTRAVENOUS at 09:47

## 2024-11-25 RX ADMIN — CIPROFLOXACIN AND DEXAMETHASONE 4 DROP: 3; 1 SUSPENSION/ DROPS AURICULAR (OTIC) at 09:47

## 2024-11-25 RX ADMIN — LEVOTHYROXINE SODIUM 25 MCG: 0.03 TABLET ORAL at 05:01

## 2024-11-25 RX ADMIN — BUDESONIDE 500 MCG: 0.5 SUSPENSION RESPIRATORY (INHALATION) at 08:49

## 2024-11-25 RX ADMIN — ROFLUMILAST 500 MCG: 500 TABLET ORAL at 09:47

## 2024-11-25 RX ADMIN — IPRATROPIUM BROMIDE AND ALBUTEROL SULFATE 1 DOSE: 2.5; .5 SOLUTION RESPIRATORY (INHALATION) at 11:58

## 2024-11-25 ASSESSMENT — PAIN SCALES - GENERAL: PAINLEVEL_OUTOF10: 0

## 2024-11-25 NOTE — DISCHARGE SUMMARY
02:42 PM    AMORPHOUS 2+ 07/05/2023 06:11 PM     Urine Cultures:   Lab Results   Component Value Date/Time    LABURIN  08/30/2023 02:09 PM     <10,000 CFU/ml mixed skin/urogenital milad. No further workup     Blood Cultures:   Lab Results   Component Value Date/Time    BC No Growth after 4 days of incubation. 01/02/2024 10:11 PM     Lab Results   Component Value Date/Time    BLOODCULT2 No Growth after 4 days of incubation. 01/02/2024 10:11 PM     Organism:   Lab Results   Component Value Date/Time    ORG BHS Group B (Strep agalacticae) 07/05/2023 06:11 PM       Time Spent Discharging patient 35 minutes    Electronically signed by Mena Calzada MD on 11/25/2024 at 11:48 AM

## 2024-11-25 NOTE — CARE COORDINATION
CASE MANAGEMENT DISCHARGE SUMMARY      Discharge to: Home with [facility]    IMM given: 11/25/2024    Transportation:    Family/car:    Confirmed discharge plan with:     Patient: yes     Family:  yes     Facility/Agency, name:  [facility] from [facility] aware and will pull orders.      RN, name: Christiano Wilson RN

## 2024-11-25 NOTE — DISCHARGE INSTR - COC
Continuity of Care Form    Patient Name: Terri Smith   :  1940  MRN:  [id number]    Admit date:  2024  Discharge date:  ***    Code Status Order: Full Code   Advance Directives:   Advance Care Flowsheet Documentation             Admitting Physician:  Maurisio Lay DO  PCP: Jackson Ontiveros MD    Discharging Nurse: ***  Discharging Hospital Unit/Room#: 0365/0365-01  Discharging Unit Phone Number: ***    Emergency Contact:   Extended Emergency Contact Information  Primary Emergency Contact: Juan Smith   [facility]  Home Phone: [phone]  Relation: Child  Secondary Emergency Contact: Hafsa Tapia  Home Phone: [phone]  Mobile Phone: [phone]  Relation: Grandchild    Past Surgical History:  Past Surgical History:   Procedure Laterality Date    BRONCHOSCOPY  2019    Dr. Wheatley - w/BAL    CARDIAC CATHETERIZATION  2019    Dr. Palomares     SECTION      COLONOSCOPY N/A 2020    COLONOSCOPY DIAGNOSTIC performed by Rowdy Schmitz DO at [facility] ENDOSCOPY    COLONOSCOPY N/A 10/22/2021    COLONOSCOPY POLYPECTOMY SNARE/COLD BIOPSY [facility]    COLONOSCOPY N/A 2023    COLONOSCOPY POLYPECTOMY SNARE performed by Darron Langford MD at [facility] ENDOSCOPY    CORONARY ARTERY BYPASS GRAFT N/A 2019    OFF PUMP CORONARY ARTERY BYPASS GRAFTING X2, INTERNAL MAMMARY ARTERY, SAPHENOUS VEIN GRAFT performed by Yolanda Chase MD at [facility] CVOR    IR MIDLINE CATH  2021    IR MIDLINE CATH 2021 [facility]    MASTECTOMY, PARTIAL Left     SIGMOIDOSCOPY  2020    4 bands    SIGMOIDOSCOPY N/A 2020    FLEX SIG W/ BANDING SIGMOIDOSCOPY DIAGNOSTIC FLEXIBLE performed by Rowdy Schmitz DO at [facility] ENDOSCOPY    SIGMOIDOSCOPY N/A 2023    FLEX SIG. performed by Celestine Lester MD at [facility] ENDOSCOPY    UPPER GASTROINTESTINAL ENDOSCOPY N/A 2023    EGD

## 2024-11-25 NOTE — PLAN OF CARE
Problem: Chronic Conditions and Co-morbidities  Goal: Patient's chronic conditions and co-morbidity symptoms are monitored and maintained or improved  Outcome: Adequate for Discharge     Problem: Discharge Planning  Goal: Discharge to home or other facility with appropriate resources  Outcome: Adequate for Discharge     CHF Care Plan      Patient's EF (Ejection Fraction) is greater than 40%    Heart Failure Medications:  Diuretics:: Furosemide    (One of the following REQUIRED for EF </= 40%/SYSTOLIC FAILURE but MAY be used in EF% >40%/DIASTOLIC FAILURE)        ACE:: None        ARB:: None         ARNI:: None    (Beta Blockers)  NON- Evidenced Based Beta Blocker (for EF% >40%/DIASTOLIC FAILURE): Metoprolol TARTrate- Lopressor    Evidenced Based Beta Blocker::(REQUIRED for EF% <40%/SYSTOLIC FAILURE) None  ...................................................................................................................................................    Failed to redirect to the Timeline version of the [employer] SmartLink.      Patient's weights and intake/output reviewed    Daily Weight log at bedside, patient/family participation in use of log: \"yes    Patient's current weight today shows a difference of 0 lbs less than last documented weight.      Intake/Output Summary (Last 24 hours) at 11/25/2024 1437  Last data filed at 11/25/2024 1353  Gross per 24 hour   Intake 1200 ml   Output 1150 ml   Net 50 ml       Education Booklet Provided: yes    Comorbidities Reviewed Yes    Patient has a past medical history of NADJA (acute kidney injury) ([facility]), Asthma, Atrial fibrillation with RVR ([facility]), CAD (coronary artery disease), CHF (congestive heart failure) ([facility]), Chronic anxiety, COPD (chronic obstructive pulmonary disease) ([facility]), COPD (chronic obstructive pulmonary disease) ([facility]), GERD (gastroesophageal reflux disease), Heart attack (HCC), History of blood transfusion, Hyperlipidemia, Hypertension, MRSA (methicillin resistant 
  Problem: Chronic Conditions and Co-morbidities  Goal: Patient's chronic conditions and co-morbidity symptoms are monitored and maintained or improved  Outcome: Progressing     Problem: Discharge Planning  Goal: Discharge to home or other facility with appropriate resources  Outcome: Progressing     Problem: Pain  Goal: Verbalizes/displays adequate comfort level or baseline comfort level  Outcome: Progressing     Problem: Safety - Adult  Goal: Free from fall injury  Outcome: Progressing     Problem: Skin/Tissue Integrity  Goal: Absence of new skin breakdown  Description: 1.  Monitor for areas of redness and/or skin breakdown  2.  Assess vascular access sites hourly  3.  Every 4-6 hours minimum:  Change oxygen saturation probe site  4.  Every 4-6 hours:  If on nasal continuous positive airway pressure, respiratory therapy assess nares and determine need for appliance change or resting period.  Outcome: Progressing     Problem: Respiratory - Adult  Goal: Achieves optimal ventilation and oxygenation  Outcome: Progressing     Problem: Cardiovascular - Adult  Goal: Maintains optimal cardiac output and hemodynamic stability  Outcome: Progressing  Goal: Absence of cardiac dysrhythmias or at baseline  Outcome: Progressing     Problem: Skin/Tissue Integrity - Adult  Goal: Skin integrity remains intact  Outcome: Progressing  Flowsheets (Taken 11/23/2024 1032 by Heather Gama RN)  Skin Integrity Remains Intact: Monitor for areas of redness and/or skin breakdown     Problem: Genitourinary - Adult  Goal: Absence of urinary retention  Outcome: Progressing     Problem: Infection - Adult  Goal: Absence of infection at discharge  Outcome: Progressing     Problem: Metabolic/Fluid and Electrolytes - Adult  Goal: Electrolytes maintained within normal limits  Outcome: Progressing     Problem: Nutrition Deficit:  Goal: Optimize nutritional status  Outcome: Progressing     
  Problem: Chronic Conditions and Co-morbidities  Goal: Patient's chronic conditions and co-morbidity symptoms are monitored and maintained or improved  Outcome: Progressing  Flowsheets (Taken 11/24/2024 1957)  Care Plan - Patient's Chronic Conditions and Co-Morbidity Symptoms are Monitored and Maintained or Improved: Monitor and assess patient's chronic conditions and comorbid symptoms for stability, deterioration, or improvement     Problem: Discharge Planning  Goal: Discharge to home or other facility with appropriate resources  Outcome: Progressing  Flowsheets (Taken 11/24/2024 1957)  Discharge to home or other facility with appropriate resources: Identify barriers to discharge with patient and caregiver     Problem: Pain  Goal: Verbalizes/displays adequate comfort level or baseline comfort level  Outcome: Progressing  Flowsheets (Taken 11/24/2024 1947)  Verbalizes/displays adequate comfort level or baseline comfort level: Encourage patient to monitor pain and request assistance     Problem: Safety - Adult  Goal: Free from fall injury  Outcome: Progressing     Problem: Skin/Tissue Integrity  Goal: Absence of new skin breakdown  Description: 1.  Monitor for areas of redness and/or skin breakdown  2.  Assess vascular access sites hourly  3.  Every 4-6 hours minimum:  Change oxygen saturation probe site  4.  Every 4-6 hours:  If on nasal continuous positive airway pressure, respiratory therapy assess nares and determine need for appliance change or resting period.  Outcome: Progressing     Problem: Respiratory - Adult  Goal: Achieves optimal ventilation and oxygenation  Outcome: Progressing  Flowsheets (Taken 11/24/2024 1957)  Achieves optimal ventilation and oxygenation: Assess for changes in respiratory status     Problem: Cardiovascular - Adult  Goal: Maintains optimal cardiac output and hemodynamic stability  Outcome: Progressing  Flowsheets (Taken 11/24/2024 1957)  Maintains optimal cardiac output and 
  Problem: Discharge Planning  Goal: Discharge to home or other facility with appropriate resources  Outcome: Progressing     Problem: Chronic Conditions and Co-morbidities  Goal: Patient's chronic conditions and co-morbidity symptoms are monitored and maintained or improved  Outcome: Progressing     Problem: Safety - Adult  Goal: Free from fall injury  Outcome: Progressing     Problem: Pain  Goal: Verbalizes/displays adequate comfort level or baseline comfort level  Outcome: Progressing     Problem: Skin/Tissue Integrity  Goal: Absence of new skin breakdown  Description: 1.  Monitor for areas of redness and/or skin breakdown  2.  Assess vascular access sites hourly  3.  Every 4-6 hours minimum:  Change oxygen saturation probe site  4.  Every 4-6 hours:  If on nasal continuous positive airway pressure, respiratory therapy assess nares and determine need for appliance change or resting period.  Outcome: Progressing     Problem: Respiratory - Adult  Goal: Achieves optimal ventilation and oxygenation  Outcome: Progressing     Problem: Genitourinary - Adult  Goal: Absence of urinary retention  Outcome: Progressing     Problem: Skin/Tissue Integrity - Adult  Goal: Skin integrity remains intact  Outcome: Progressing     Problem: Infection - Adult  Goal: Absence of infection at discharge  Outcome: Progressing     
CHF Care Plan      Patient's EF (Ejection Fraction) is greater than 40%    Heart Failure Medications:  Diuretics:: Furosemide    (One of the following REQUIRED for EF </= 40%/SYSTOLIC FAILURE but MAY be used in EF% >40%/DIASTOLIC FAILURE)        ACE:: None        ARB:: None         ARNI:: None    (Beta Blockers)  NON- Evidenced Based Beta Blocker (for EF% >40%/DIASTOLIC FAILURE): Metoprolol TARTrate- Lopressor    Evidenced Based Beta Blocker::(REQUIRED for EF% <40%/SYSTOLIC FAILURE) None  ...................................................................................................................................................    Failed to redirect to the Timeline version of the [email or web address] SmartLink.      Patient's weights and intake/output reviewed    Daily Weight log at bedside, patient/family participation in use of log: \"yes    Patient's current weight today shows a difference of 6 lbs less than last documented weight.      Intake/Output Summary (Last 24 hours) at 11/22/2024 1712  Last data filed at 11/22/2024 0558  Gross per 24 hour   Intake 210 ml   Output 300 ml   Net -90 ml       Education Booklet Provided: yes    Comorbidities Reviewed Yes    Patient has a past medical history of NADJA (acute kidney injury) ([facility]), Asthma, Atrial fibrillation with RVR ([facility]), CAD (coronary artery disease), CHF (congestive heart failure) ([facility]), Chronic anxiety, COPD (chronic obstructive pulmonary disease) (HCC), COPD (chronic obstructive pulmonary disease) (HCC), GERD (gastroesophageal reflux disease), Heart attack (HCC), History of blood transfusion, Hyperlipidemia, Hypertension, MRSA (methicillin resistant staph aureus) culture positive, OA (osteoarthritis), and Thyroid disease.     >>For CHF and Comorbidity documentation on Education Time and Topics, please see Education Tab      CHF Education    Learners:  Patient  Readineess:   Acceptance  Method:   Explanation  Response:    Verbalizes Understanding, Needs Reinforcement, 
CHF Care Plan      Patient's EF (Ejection Fraction) is greater than 40%    Heart Failure Medications:  Diuretics:: Furosemide    (One of the following REQUIRED for EF </= 40%/SYSTOLIC FAILURE but MAY be used in EF% >40%/DIASTOLIC FAILURE)        ACE:: None        ARB:: None         ARNI:: None    (Beta Blockers)  NON- Evidenced Based Beta Blocker (for EF% >40%/DIASTOLIC FAILURE): Metoprolol TARTrate- Lopressor    Evidenced Based Beta Blocker::(REQUIRED for EF% <40%/SYSTOLIC FAILURE) None  ...................................................................................................................................................    Failed to redirect to the Timeline version of the [employer] SmartLink.      Patient's weights and intake/output reviewed    Daily Weight log at bedside, patient/family participation in use of log: \"yes    Patient's current weight today shows a difference of 0.2 lbs less than last documented weight.      Intake/Output Summary (Last 24 hours) at 11/23/2024 0624  Last data filed at 11/22/2024 2308  Gross per 24 hour   Intake 1040 ml   Output 450 ml   Net 590 ml       Education Booklet Provided: yes    Comorbidities Reviewed Yes    Patient has a past medical history of NADJA (acute kidney injury) ([facility]), Asthma, Atrial fibrillation with RVR ([facility]), CAD (coronary artery disease), CHF (congestive heart failure) ([facility]), Chronic anxiety, COPD (chronic obstructive pulmonary disease) (HCC), COPD (chronic obstructive pulmonary disease) (HCC), GERD (gastroesophageal reflux disease), Heart attack (HCC), History of blood transfusion, Hyperlipidemia, Hypertension, MRSA (methicillin resistant staph aureus) culture positive, OA (osteoarthritis), and Thyroid disease.     >>For CHF and Comorbidity documentation on Education Time and Topics, please see Education Tab      CHF Education    Learners:  Patient  Readineess:   Acceptance  Method:   Explanation  Response:    Needs Reinforcement    Comments:     Time 
CHF Care Plan      Patient's EF (Ejection Fraction) is greater than 40%    Heart Failure Medications:  Diuretics:: Furosemide    (One of the following REQUIRED for EF </= 40%/SYSTOLIC FAILURE but MAY be used in EF% >40%/DIASTOLIC FAILURE)        ACE:: None        ARB:: None         ARNI:: None    (Beta Blockers)  NON- Evidenced Based Beta Blocker (for EF% >40%/DIASTOLIC FAILURE): Metoprolol TARTrate- Lopressor    Evidenced Based Beta Blocker::(REQUIRED for EF% <40%/SYSTOLIC FAILURE) None  ...................................................................................................................................................    Failed to redirect to the Timeline version of the [employer] SmartLink.      Patient's weights and intake/output reviewed    Daily Weight log at bedside, patient/family participation in use of log: \"yes    Patient's current weight today shows a difference of 0 lbs less than last documented weight.      Intake/Output Summary (Last 24 hours) at 11/23/2024 1036  Last data filed at 11/23/2024 0846  Gross per 24 hour   Intake 1090 ml   Output 1350 ml   Net -260 ml       Education Booklet Provided: yes    Comorbidities Reviewed Yes    Patient has a past medical history of NADJA (acute kidney injury) ([facility]), Asthma, Atrial fibrillation with RVR ([facility]), CAD (coronary artery disease), CHF (congestive heart failure) ([facility]), Chronic anxiety, COPD (chronic obstructive pulmonary disease) (HCC), COPD (chronic obstructive pulmonary disease) (HCC), GERD (gastroesophageal reflux disease), Heart attack (HCC), History of blood transfusion, Hyperlipidemia, Hypertension, MRSA (methicillin resistant staph aureus) culture positive, OA (osteoarthritis), and Thyroid disease.     >>For CHF and Comorbidity documentation on Education Time and Topics, please see Education Tab      CHF Education    Learners:  Patient  Readineess:   Acceptance  Method:   Explanation  Response:    Verbalizes Understanding and Needs 
CHF Care Plan      Patient's EF (Ejection Fraction) is greater than 40%    Heart Failure Medications:  Diuretics:: Furosemide    (One of the following REQUIRED for EF </= 40%/SYSTOLIC FAILURE but MAY be used in EF% >40%/DIASTOLIC FAILURE)        ACE:: None        ARB:: None         ARNI:: None    (Beta Blockers)  NON- Evidenced Based Beta Blocker (for EF% >40%/DIASTOLIC FAILURE): Metoprolol TARTrate- Lopressor    Evidenced Based Beta Blocker::(REQUIRED for EF% <40%/SYSTOLIC FAILURE) None  ...................................................................................................................................................    Failed to redirect to the Timeline version of the [employer] SmartLink.      Patient's weights and intake/output reviewed    Daily Weight log at bedside, patient/family participation in use of log: \"yes    Patient's current weight today shows a difference of 2 lbs less than last documented weight.      Intake/Output Summary (Last 24 hours) at 11/24/2024 0443  Last data filed at 11/23/2024 2055  Gross per 24 hour   Intake 595 ml   Output 1500 ml   Net -905 ml       Education Booklet Provided: yes    Comorbidities Reviewed Yes    Patient has a past medical history of NADJA (acute kidney injury) ([facility]), Asthma, Atrial fibrillation with RVR (HCC), CAD (coronary artery disease), CHF (congestive heart failure) ([facility]), Chronic anxiety, COPD (chronic obstructive pulmonary disease) (HCC), COPD (chronic obstructive pulmonary disease) (HCC), GERD (gastroesophageal reflux disease), Heart attack (HCC), History of blood transfusion, Hyperlipidemia, Hypertension, MRSA (methicillin resistant staph aureus) culture positive, OA (osteoarthritis), and Thyroid disease.     >>For CHF and Comorbidity documentation on Education Time and Topics, please see Education Tab      CHF Education    Learners:  Patient  Readineess:   Acceptance  Method:   Explanation  Response:    Verbalizes Understanding    Comments: 
CHF Care Plan      Patient's EF (Ejection Fraction) is greater than 40%    Heart Failure Medications:  Diuretics:: Furosemide    (One of the following REQUIRED for EF </= 40%/SYSTOLIC FAILURE but MAY be used in EF% >40%/DIASTOLIC FAILURE)        ACE:: None        ARB:: None         ARNI:: None    (Beta Blockers)  NON- Evidenced Based Beta Blocker (for EF% >40%/DIASTOLIC FAILURE): Metoprolol TARTrate- Lopressor    Evidenced Based Beta Blocker::(REQUIRED for EF% <40%/SYSTOLIC FAILURE) None  ...................................................................................................................................................    Failed to redirect to the Timeline version of the [employer] SmartLink.      Patient's weights and intake/output reviewed    Daily Weight log at bedside, patient/family participation in use of log: pt unable to participate\" (refused)    Patient's current weight today shows a difference of 2 lbs less than last documented weight.      Intake/Output Summary (Last 24 hours) at 11/24/2024 1020  Last data filed at 11/24/2024 1017  Gross per 24 hour   Intake 591 ml   Output 800 ml   Net -209 ml       Education Booklet Provided: no    Comorbidities Reviewed Yes    Patient has a past medical history of NADJA (acute kidney injury) ([facility]), Asthma, Atrial fibrillation with RVR ([facility]), CAD (coronary artery disease), CHF (congestive heart failure) ([facility]), Chronic anxiety, COPD (chronic obstructive pulmonary disease) (HCC), COPD (chronic obstructive pulmonary disease) (HCC), GERD (gastroesophageal reflux disease), Heart attack (HCC), History of blood transfusion, Hyperlipidemia, Hypertension, MRSA (methicillin resistant staph aureus) culture positive, OA (osteoarthritis), and Thyroid disease.     >>For CHF and Comorbidity documentation on Education Time and Topics, please see Education Tab      CHF Education    Learners:  Patient and Family  Readineess:   Acceptance  Method:   Explanation  Response:    Needs 
CHF Care Plan      Patient's EF (Ejection Fraction) is greater than 40%    Heart Failure Medications:  Diuretics:: Furosemide    (One of the following REQUIRED for EF </= 40%/SYSTOLIC FAILURE but MAY be used in EF% >40%/DIASTOLIC FAILURE)        ACE:: None        ARB:: None         ARNI:: None    (Beta Blockers)  NON- Evidenced Based Beta Blocker (for EF% >40%/DIASTOLIC FAILURE): Metoprolol TARTrate- Lopressor    Evidenced Based Beta Blocker::(REQUIRED for EF% <40%/SYSTOLIC FAILURE) None  ...................................................................................................................................................    Failed to redirect to the Timeline version of the [employer] SmartLink.      Patient's weights and intake/output reviewed    Daily Weight log at bedside, patient/family participation in use of log: \"yes    Patient's current weight today shows a difference of 0 lbs than last documented weight.      Intake/Output Summary (Last 24 hours) at 11/25/2024 0435  Last data filed at 11/25/2024 0434  Gross per 24 hour   Intake 716 ml   Output 750 ml   Net -34 ml       Education Booklet Provided: yes    Comorbidities Reviewed Yes    Patient has a past medical history of NADJA (acute kidney injury) ([facility]), Asthma, Atrial fibrillation with RVR (HCC), CAD (coronary artery disease), CHF (congestive heart failure) ([facility]), Chronic anxiety, COPD (chronic obstructive pulmonary disease) (HCC), COPD (chronic obstructive pulmonary disease) (HCC), GERD (gastroesophageal reflux disease), Heart attack (HCC), History of blood transfusion, Hyperlipidemia, Hypertension, MRSA (methicillin resistant staph aureus) culture positive, OA (osteoarthritis), and Thyroid disease.     >>For CHF and Comorbidity documentation on Education Time and Topics, please see Education Tab      CHF Education    Learners:  Patient  Readineess:   Acceptance  Method:   Explanation  Response:    Verbalizes Understanding    Comments: n/a    Time 
Increase patients ADLs/functional status to baseline.    
PT eval complete. Increase function to baseline.    
Spent: 10 MINS      Pt resting in bed at this time on  3 L O2. Pt with complaints of shortness of breath. Pt with pitting lower extremity edema.     Patient and/or Family's stated Goal of Care this Admission: reduce shortness of breath, increase activity tolerance, better understand heart failure and disease management, be more comfortable, and reduce lower extremity edema prior to discharge        :   
0913)  Absence of infection at discharge: Assess and monitor for signs and symptoms of infection  11/22/2024 0235 by Zain Loja RN  Outcome: Progressing     Problem: Metabolic/Fluid and Electrolytes - Adult  Goal: Electrolytes maintained within normal limits  11/22/2024 1258 by Meghann Drummond RN  Outcome: Progressing  Flowsheets (Taken 11/22/2024 0913)  Electrolytes maintained within normal limits: Monitor labs and assess patient for signs and symptoms of electrolyte imbalances  11/22/2024 0235 by Zain Loja RN  Outcome: Progressing

## 2024-11-25 NOTE — PROGRESS NOTES
V2.0    [facility] [place], [place] Progress Note      Name:  Terri Smith /Age/Sex: 1940  (84 y.o. female)   MRN & CSN:  [id number] & [id number] Encounter Date/Time: 2024 12:19 PM EST   Location:  0365/0365-01 PCP: Jackson Ontiveros MD     Attending:Maurisio Lay DO       Hospital Day: 3    Assessment and Recommendations   Terri Smith is a 84 y.o. female with pmh of  COPD, HFpEF, CKD A-fib, hypertension, hyperlipidemia, anxiety/depression who presents with Acute on chronic heart failure with normal ejection fraction (HCC)    Interval history:  Patient lying comfortably in bed and maintaining SpO2 at 2 L oxygen nasal cannula.  She stated improvement in her shortness of breath.      Plan:   Acute on chronic heart failure with preserved ejection fraction.   Elevated BNP of 1700    On torsemide at home, changed to IV Lasix during stay  Switched to tablet Lasix 40 mg p.o. twice a day today  Strict I's and O's.    Low-sodium diet.    Heart failure nurse consulted.  Continue Diuresis and monitor CMP  COPD.    Stable.    Requiring 2 to 3 L O2 at home, no exacerbation noted on examination.    Chest x-ray without acute process noted.    Continue oxygen with O2 sat goal of 90 to 94%.    Continue DuoNebs, budesonide, Roflumilast   Discontinued oral corticosteroids with admission.  Hypothyroidism  History of hypothyroid since   On levothyroxine 25 mcg, stopped taking 3 years back  TSH 0.26, T30.45  Start levothyroxine 25 mcg p.o. daily  Recheck thyroid function test after 6 weeks  Hypertension.   Stable.    Continue home metoprolol.  A-fib.    Stable.    Continue home Eliquis for anticoagulation and metoprolol for rate control.  Hyperlipidemia.    Stable.    Continue home atorvastatin.  Anxiety/depression.    Stable.    Continue home BuSpar and Lexapro.  Chronic kidney disease.    Stable,   Renal dose medications,   Avoid nephrotoxic agents.  Monitor CMP  Elevated troponin.    Down trending.    Likely 
4 Eyes Admission Assessment     I agree as the admission nurse that 2 RN's have performed a thorough Head to Toe Skin Assessment on the patient. ALL assessment sites listed below have been assessed for low shaji.       Areas assessed by both nurses: =  [x]   Head, Face, and Ears   [x]   Shoulders, Back, and Chest  [x]   Arms, Elbows, and Hands   [x]   Coccyx, Sacrum, and Ischum  [x]   Legs, Feet, and Heels   Redness on justa heels and buttocks     Does the Patient have Skin Breakdown?  No         Shaji Prevention initiated:  Yes   Wound Care Orders initiated:  NA      [facility] nurse consulted for Pressure Injury (Stage 3,4, Unstageable, DTI, NWPT, and Complex wounds):  NA      Nurse 1 eSignature: Electronically signed by MANUELA العلي RN on 11/23/24 at 6:08 AM EST    **SHARE this note so that the co-signing nurse is able to place an eSignature**    Nurse 2 eSignature: Electronically signed by Shimon Beltrán RN on 11/23/24 at 6:23 AM EST            
4 Eyes Skin Assessment     NAME:  Terri Smith  YOB: 1940  MEDICAL RECORD NUMBER:  [id number]    The patient is being assessed for  Other low shaji    I agree that at least one RN has performed a thorough Head to Toe Skin Assessment on the patient. ALL assessment sites listed below have been assessed.      Areas assessed by both nurses:    Head, Face, Ears, Shoulders, Back, Chest, Arms, Elbows, Hands, Sacrum. Buttock, Coccyx, Ischium, Legs. Feet and Heels, and Under Medical Devices         Does the Patient have a Wound? No noted wound(s)       Shaji Prevention initiated by RN: Yes already completed  Wound Care Orders initiated by RN: No    Pressure Injury (Stage 3,4, Unstageable, DTI, NWPT, and Complex wounds) if present, place Wound referral order by RN under : No    New Ostomies, if present place, Ostomy referral order under : No     Nurse 1 eSignature: Electronically signed by Albania Gannon RN on 11/25/24 at 4:37 AM EST    **SHARE this note so that the co-signing nurse can place an eSignature**    Nurse 2 eSignature: Electronically signed by Jerome Mcghee RN on 11/25/24 at 4:38 AM EST   
4 Eyes Skin Assessment     NAME:  Terri Smith  YOB: 1940  MEDICAL RECORD NUMBER:  [id number]    The patient is being assessed for  Other low shaji    I agree that at least one RN has performed a thorough Head to Toe Skin Assessment on the patient. ALL assessment sites listed below have been assessed.      Areas assessed by both nurses:    Head, Face, Ears, Shoulders, Back, Chest, Arms, Elbows, Hands, Sacrum. Buttock, Coccyx, Ischium, and Legs. Feet and Heels        Does the Patient have a Wound? No noted wound(s)       Shaji Prevention initiated by RN: Yes  Wound Care Orders initiated by RN: No    Pressure Injury (Stage 3,4, Unstageable, DTI, NWPT, and Complex wounds) if present, place Wound referral order by RN under : No    New Ostomies, if present place, Ostomy referral order under : No     Nurse 1 eSignature: Electronically signed by Heather Gama RN on 11/23/24 at 5:05 PM EST    **SHARE this note so that the co-signing nurse can place an eSignature**    Nurse 2 eSignature: Electronically signed by Albania De La Garza RN on 11/23/24 at 5:08 PM EST    
4 Eyes Skin Assessment     NAME:  Terri Smith  YOB: 1940  MEDICAL RECORD NUMBER:  [id number]    The patient is being assessed for  Other low shaji    I agree that at least one RN has performed a thorough Head to Toe Skin Assessment on the patient. ALL assessment sites listed below have been assessed.      Areas assessed by both nurses:    Head, Face, Ears, Shoulders, Back, Chest, Arms, Elbows, Hands, Sacrum. Buttock, Coccyx, Ischium, Legs. Feet and Heels, and Under Medical Devices         Does the Patient have a Wound? No noted wound(s)       Shaji Prevention initiated by RN: Yes already completed  Wound Care Orders initiated by RN: No    Pressure Injury (Stage 3,4, Unstageable, DTI, NWPT, and Complex wounds) if present, place Wound referral order by RN under : No    New Ostomies, if present place, Ostomy referral order under : No     Nurse 1 eSignature: Electronically signed by Albania Gannon RN on 11/24/24 at 2:58 AM EST    **SHARE this note so that the co-signing nurse can place an eSignature**    Nurse 2 eSignature: Electronically signed by Kathy Camacho RN on 11/24/24 at 2:59 AM EST   
4 Eyes Skin Assessment and Patient belongings     The patient is being assess for  Admission    I agree that 2 Nurses have performed a thorough Head to Toe Skin Assessment on the patient. ALL assessment sites listed below have been assessed.       Areas assessed by both nurses:   [x]   Head, Face, and Ears   [x]   Shoulders, Back, and Chest  [x]   Arms, Elbows, and Hands   [x]   Coccyx, Sacrum, and IschIum  [x]   Legs, Feet, and Heels        Does the Patient have Skin Breakdown?  No       BLANCHABLE REDNESS ON BUTTOCKS WITH SOME EXCORIATION DOWN THE PERINEUM, RED HEELS  Trey Prevention initiated:  Yes   Wound Care Orders initiated:  NA      [facility] nurse consulted for Pressure Injury (Stage 3,4, Unstageable, DTI, NWPT, and Complex wounds), New and Established Ostomies:  NA      I agree that 2 Nurses have reviewed patient belongings with the patient/family and documented in the flowsheet upon admission or transfer to the unit.     Belongings  Dental Appliances: None  Vision - Corrective Lenses: None  Hearing Aid: None  Clothing: Footwear, Sweater (NIGHT GOWN)  Jewelry: None  Electronic Devices: Cell Phone, , Other (Comment) (PORTABLE VENT PLUS )  Weapons (Notify Protective Services/Security): None  Other Valuables: Other (Comment) (PORTABLE ELECTRIC FAN)  Home Medications: None  Valuables Given To: Patient  Provide Name(s) of Who Valuable(s) Were Given To: SHARON ARVIZU       Nurse 1 eSignature: Electronically signed by MANUELA العلي RN on 11/22/24 at 3:30 AM EST    **SHARE this note so that the co-signing nurse is able to place an eSignature**    Nurse 2 eSignature: Electronically signed by Shimon Beltrán RN on 11/22/24 at 3:34 AM EST   
Comprehensive Nutrition Assessment    Type and Reason for Visit:  Initial (Low BMI)    Nutrition Recommendations/Plan:   Continue CHRISTIANNE diet.   Add Ensure Compact BID (prefers vanilla).   Add Ensure Clear once per day.   Encourage PO intakes as tolerated.   Monitor nutrition adequacy, pertinent labs, bowel habits, wt changes, and clinical progress      Malnutrition Assessment:  Malnutrition Status:  At risk for malnutrition (11/22/24 1353)    Context:  Chronic Illness     Findings of the 6 clinical characteristics of malnutrition:  Energy Intake:  Mild decrease in energy intake  Weight Loss:  Unable to assess (likely fluid shifts)     Body Fat Loss:  Mild body fat loss Orbital   Muscle Mass Loss:  Unable to assess    Fluid Accumulation:  Mild     Strength:  Not Performed    Nutrition Assessment:    Pt visited for initial + low BMI (17.99 kg/m2). Admitted w/ SOB and chest congestion but found to have acute on chronic heart failure w/ normal ejection fraction. PMH of CAD, CHF, COPD, GERD, HLD, HTN, NADJA, and CKD. Pt reports that her appetite is good at admission but was poor prior when she \"felt bad.\" No PO intake recorded this admit. States she typically eats 3 small meals/day. Wts difficult to assess d/t likely fluid shifts. Pt unsure of UBW. Reports constipation (last BM documented 11/21) but denies nausea, emesis, or constipation. Pt states she has trouble chewing certain foods like melon d/t lack of upper teeth (but she does wear lower dentures). Agreeable to ONS TID. Will continue to monitor.    Nutrition Related Findings:    Last BM documented 11/21. BUN: 39 mg/dL and creatinine: 1/5 mg/dL. +1 BLE edema. Wound Type: None       Current Nutrition Intake & Therapies:    Average Meal Intake: Unable to assess  Average Supplements Intake: None Ordered  ADULT DIET; Regular; No Added Salt (3-4 gm)  ADULT ORAL NUTRITION SUPPLEMENT; Breakfast, Dinner; Standard 4 oz Oral Supplement  ADULT ORAL NUTRITION SUPPLEMENT; 
Patient admitted to room 365 from [facility] ED.  Patient oriented to room, call light, bed rails, phone, lights and bathroom.  Patient instructed about the schedule of the day including: vital sign frequency, lab draws, possible tests, frequency of MD and staff rounds, including RN/MD rounding together at bedside, daily weights, and I &O's.  Patient instructed about prescribed diet, how to use 8MENU, and television.   bed alarm in place, patient aware of placement and reason.   Telemetry box  in place, patient aware of placement and reason.  Bed locked, in lowest position, side rails up 2/4, call light within reach.  Will continue to monitor.     
Patient discharged to home with home healthcare. IV removed with no complications, telemetry removed [facility] notified. Discharge education complete with verbal understanding. All belongings sent home with patient. Patient transported via private vehicle    
Physical Therapy  Facility/Department: [address] SURG  Physical Therapy Initial Assessment/Treatment    Name: Terri Smith  : 1940  MRN: [id number]  Date of Service: 2024    Discharge Recommendations:  24 hour supervision or assist, Home with Home health PT   PT Equipment Recommendations  Equipment Needed: No      Patient Diagnosis(es): The primary encounter diagnosis was Acute on chronic congestive heart failure, unspecified heart failure type (HCC). Diagnoses of COPD exacerbation (HCC), Elevated brain natriuretic peptide (BNP) level, Elevated troponin, and Chest pain, unspecified type were also pertinent to this visit.  Past Medical History:  has a past medical history of NADJA (acute kidney injury) (HCC), Asthma, Atrial fibrillation with RVR (HCC), CAD (coronary artery disease), CHF (congestive heart failure) (HCC), Chronic anxiety, COPD (chronic obstructive pulmonary disease) (HCC), COPD (chronic obstructive pulmonary disease) (HCC), GERD (gastroesophageal reflux disease), Heart attack (HCC), History of blood transfusion, Hyperlipidemia, Hypertension, MRSA (methicillin resistant staph aureus) culture positive, OA (osteoarthritis), and Thyroid disease.  Past Surgical History:  has a past surgical history that includes  section; Mastectomy, partial (Left); bronchoscopy (2019); Cardiac catheterization (2019); Coronary artery bypass graft (N/A, 2019); Colonoscopy (N/A, 2020); Sigmoidoscopy (2020); sigmoidoscopy (N/A, 2020); IR MIDLINE CATH (2021); Colonoscopy (N/A, 10/22/2021); Upper gastrointestinal endoscopy (N/A, 2023); Upper gastrointestinal endoscopy (N/A, 2023); Upper gastrointestinal endoscopy (2023); Colonoscopy (N/A, 2023); sigmoidoscopy (N/A, 2023); Upper gastrointestinal endoscopy (N/A, 2024); Upper gastrointestinal endoscopy (N/A, 2024); and Upper gastrointestinal endoscopy (2024).    Assessment  Body 
Transport here to take pt to XR, [facility] notified  
1.626 m (5' 4.02\")          Physical Exam:      General: NAD  Eyes: EOMI  ENT: neck supple  Cardiovascular: Regular rate.  Respiratory: Clear to auscultation  Gastrointestinal: Soft, non tender  Genitourinary: no suprapubic tenderness  Musculoskeletal: No edema  Skin: warm, dry  Neuro: Alert.  Psych: Mood appropriate.         Medications:   Medications:    [START ON 11/23/2024] furosemide  40 mg IntraVENous Daily    furosemide  40 mg Oral Once    atorvastatin  40 mg Oral Nightly    apixaban  2.5 mg Oral BID    escitalopram  10 mg Oral Daily    guaiFENesin  600 mg Oral Daily    metoprolol tartrate  25 mg Oral BID    pantoprazole  40 mg Oral BID    sodium chloride flush  5-40 mL IntraVENous 2 times per day    ciprofloxacin-dexAMETHasone  4 drop Right Ear BID    ipratropium 0.5 mg-albuterol 2.5 mg  1 Dose Inhalation Q4H WA RT    budesonide  0.5 mg Nebulization BID RT    Roflumilast  500 mcg Oral Daily      Infusions:    sodium chloride       PRN Meds: busPIRone, 10 mg, Q6H PRN  sodium chloride flush, 5-40 mL, PRN  sodium chloride, , PRN  ondansetron, 4 mg, Q8H PRN   Or  ondansetron, 4 mg, Q6H PRN  polyethylene glycol, 17 g, Daily PRN  acetaminophen, 650 mg, Q6H PRN   Or  acetaminophen, 650 mg, Q6H PRN  fluticasone, 1 spray, Daily PRN        Labs and Imaging   XR CHEST PORTABLE    Result Date: 11/21/2024  EXAMINATION: ONE XRAY VIEW OF THE CHEST 11/21/2024 2:24 pm COMPARISON: 10/11/2024 HISTORY: ORDERING SYSTEM PROVIDED HISTORY: SOB TECHNOLOGIST PROVIDED HISTORY: Reason for exam:->SOB FINDINGS: The lungs are without acute focal process.  There is no effusion or pneumothorax. The cardiomediastinal silhouette is stable. The osseous structures are stable.     No acute process.       CBC:   Recent Labs     11/21/24  1443 11/22/24  0602   WBC 15.2* 9.0   HGB 12.3 11.5*    286     BMP:    Recent Labs     11/21/24  1443 11/22/24  0602    142   K 4.9 4.8    99   CO2 30 29   BUN 32* 39*   CREATININE 1.3* 1.5* 
troponins was also elevated at 61 and was downtrending to 54.  White blood cell count was elevated at 15.2 as well, and hospital team was consulted to admit patient for exacerbation of CHF with concerns for possible COPD exacerbation.      Review of Systems:      Pertinent positives and negatives discussed in HPI    Objective:     Intake/Output Summary (Last 24 hours) at 11/24/2024 0753  Last data filed at 11/24/2024 0445  Gross per 24 hour   Intake 831 ml   Output 1500 ml   Net -669 ml      Vitals:   Vitals:    11/23/24 2344 11/24/24 0248 11/24/24 0341 11/24/24 0443   BP:  (!) 142/88     Pulse:  86     Resp:  20     Temp:  97.4 °F (36.3 °C)     TempSrc:  Oral     SpO2: 98% 98% 98%    Weight:    46.6 kg (102 lb 11.2 oz)   Height:             Physical Exam:      General: NAD  Eyes: EOMI  ENT: neck supple  Cardiovascular: Regular rate.  Respiratory: Clear to auscultation  Gastrointestinal: Soft, non tender  Genitourinary: no suprapubic tenderness  Musculoskeletal: No edema  Skin: warm, dry  Neuro: Alert.  Psych: Mood appropriate.         Medications:   Medications:    ciprofloxacin-dexAMETHasone  4 drop Right Ear BID    furosemide  40 mg Oral BID    levothyroxine  25 mcg Oral Daily    atorvastatin  40 mg Oral Nightly    apixaban  2.5 mg Oral BID    escitalopram  10 mg Oral Daily    guaiFENesin  600 mg Oral Daily    metoprolol tartrate  25 mg Oral BID    pantoprazole  40 mg Oral BID    sodium chloride flush  5-40 mL IntraVENous 2 times per day    ipratropium 0.5 mg-albuterol 2.5 mg  1 Dose Inhalation Q4H WA RT    budesonide  0.5 mg Nebulization BID RT    Roflumilast  500 mcg Oral Daily      Infusions:    sodium chloride       PRN Meds: busPIRone, 10 mg, Q6H PRN  sodium chloride flush, 5-40 mL, PRN  sodium chloride, , PRN  ondansetron, 4 mg, Q8H PRN   Or  ondansetron, 4 mg, Q6H PRN  polyethylene glycol, 17 g, Daily PRN  acetaminophen, 650 mg, Q6H PRN   Or  acetaminophen, 650 mg, Q6H PRN  fluticasone, 1 spray, Daily 
handout    ------------------------------------------------------------------------------------------------------------------------------------                    1)Heart Failure Zones Self Check Plan referencing Red/Yellow/Green Light Symbol with:  [] Green Zone/All Clear:   physical activity is normal for you,   No new swelling in feet, ankles, legs or stomach,   No weight gain of more than 2-3 pounds,   No chest pain or worsening of shortness of breath.   (Continue daily: weight check, meds as directed, low salt eating, monitoring of fluid intake, balance activity, follow up visits)  [x] Yellow Zone/Caution:   Increased cough or shortness of breath with activity  Increased swelling in your feet, ankles, legs or stomach from baseline  Weight gain or loss of more than 2-3 pounds in 1 day.  Increase in the number of pillows needed while sleeping  (Check In!: You need to contact your doctor or provider as soon as possible)  [] Red Zone/Medical Alert:  Unrelieved chest pain or shortness of breath, especially while resting  Increased Discomfort or swelling in the abdomen or lower body  Sudden weight gain of more than 5 pounds in a week  Increased cough with bubbly and/or pink sputum  (Warning: You need to be seen right away .  If you cannot reach your physician, call 911)    ------------------------------------------------------------------------------------------------------------------------------------         2)Michelle Rating of Perceived Exertion (RPE) Scale     The Michelle rating scale ranges from 6 to 20, where 6 means \"no exertion at all\" and 20 means \"maximal exertion.\" The patient chooses the number that best describes their level of exertion. This will objectify the intensity level of the patient's activity, and the therapist can use this information to accelerate or decelerate activity levels to reach the desired range.    The patient should appraise their feeling of exertion as honestly as possible, without

## 2024-11-26 RX ORDER — POTASSIUM CHLORIDE 750 MG/1
20 TABLET, EXTENDED RELEASE ORAL DAILY
Qty: 10 TABLET | Refills: 0 | OUTPATIENT
Start: 2024-11-26 | End: 2024-12-01

## 2024-11-26 NOTE — TELEPHONE ENCOUNTER
Care Transitions Initial Follow Up Call    Call within 2 business days of discharge: Yes     Patient: Terri Smith Patient : 1940 MRN: [id number]    [unfilled]    RARS: Readmission Risk Score: 30       Spoke with: patient     Discharge department/facility: home    Non-face-to-face services provided:  Scheduled appointment with PCP-7days    Spoke to patient for hospital follow up.  States she is doing well but her voice is very hoarse and does not wish to continue to speak.   Denies any needs    Follow Up  No future appointments.    Arely Cordova RN

## 2024-12-01 ENCOUNTER — APPOINTMENT (OUTPATIENT)
Dept: GENERAL RADIOLOGY | Age: 84
DRG: 683 | End: 2024-12-01
Payer: MEDICARE

## 2024-12-01 ENCOUNTER — HOSPITAL ENCOUNTER (INPATIENT)
Age: 84
LOS: 5 days | Discharge: NURSING, REHAB OR LONG-TERM CARE | DRG: 683 | End: 2024-12-06
Attending: EMERGENCY MEDICINE | Admitting: INTERNAL MEDICINE
Payer: MEDICARE

## 2024-12-01 ENCOUNTER — APPOINTMENT (OUTPATIENT)
Dept: CT IMAGING | Age: 84
DRG: 683 | End: 2024-12-01
Payer: MEDICARE

## 2024-12-01 DIAGNOSIS — E83.42 HYPOMAGNESEMIA: ICD-10-CM

## 2024-12-01 DIAGNOSIS — N18.9 CHRONIC KIDNEY DISEASE, UNSPECIFIED CKD STAGE: ICD-10-CM

## 2024-12-01 DIAGNOSIS — R26.2 AMBULATORY DYSFUNCTION: ICD-10-CM

## 2024-12-01 DIAGNOSIS — J44.9 CHRONIC OBSTRUCTIVE PULMONARY DISEASE, UNSPECIFIED COPD TYPE (HCC): ICD-10-CM

## 2024-12-01 DIAGNOSIS — R42 DIZZINESS: Primary | ICD-10-CM

## 2024-12-01 LAB
ALBUMIN SERPL-MCNC: 2.6 G/DL (ref 3.4–5)
ALBUMIN/GLOB SERPL: 0.8 {RATIO} (ref 1.1–2.2)
ALP SERPL-CCNC: 67 U/L (ref 40–129)
ALT SERPL-CCNC: 6 U/L (ref 10–40)
ANION GAP SERPL CALCULATED.3IONS-SCNC: 13 MMOL/L (ref 3–16)
APTT BLD: 38.3 SEC (ref 22.1–36.4)
AST SERPL-CCNC: 14 U/L (ref 15–37)
BASOPHILS # BLD: 0 K/UL (ref 0–0.2)
BASOPHILS NFR BLD: 0.3 %
BILIRUB SERPL-MCNC: 0.4 MG/DL (ref 0–1)
BILIRUB UR QL STRIP.AUTO: NEGATIVE
BUN SERPL-MCNC: 24 MG/DL (ref 7–20)
CALCIUM SERPL-MCNC: 9.1 MG/DL (ref 8.3–10.6)
CHLORIDE SERPL-SCNC: 102 MMOL/L (ref 99–110)
CLARITY UR: CLEAR
CO2 SERPL-SCNC: 25 MMOL/L (ref 21–32)
COLOR UR: YELLOW
CREAT SERPL-MCNC: 1.3 MG/DL (ref 0.6–1.2)
DEPRECATED RDW RBC AUTO: 14.5 % (ref 12.4–15.4)
EKG ATRIAL RATE: 103 BPM
EKG DIAGNOSIS: NORMAL
EKG P AXIS: 88 DEGREES
EKG P-R INTERVAL: 142 MS
EKG Q-T INTERVAL: 310 MS
EKG QRS DURATION: 88 MS
EKG QTC CALCULATION (BAZETT): 406 MS
EKG R AXIS: -72 DEGREES
EKG T AXIS: 91 DEGREES
EKG VENTRICULAR RATE: 103 BPM
EOSINOPHIL # BLD: 0.2 K/UL (ref 0–0.6)
EOSINOPHIL NFR BLD: 1.9 %
FLUAV RNA RESP QL NAA+PROBE: NOT DETECTED
FLUBV RNA RESP QL NAA+PROBE: NOT DETECTED
GFR SERPLBLD CREATININE-BSD FMLA CKD-EPI: 41 ML/MIN/{1.73_M2}
GLUCOSE SERPL-MCNC: 100 MG/DL (ref 70–99)
GLUCOSE UR STRIP.AUTO-MCNC: NEGATIVE MG/DL
HCT VFR BLD AUTO: 37 % (ref 36–48)
HGB BLD-MCNC: 11.7 G/DL (ref 12–16)
HGB UR QL STRIP.AUTO: NEGATIVE
INR PPP: 1.21 (ref 0.85–1.15)
KETONES UR STRIP.AUTO-MCNC: NEGATIVE MG/DL
LEUKOCYTE ESTERASE UR QL STRIP.AUTO: NEGATIVE
LYMPHOCYTES # BLD: 2.4 K/UL (ref 1–5.1)
LYMPHOCYTES NFR BLD: 19.7 %
MAGNESIUM SERPL-MCNC: 1.56 MG/DL (ref 1.8–2.4)
MCH RBC QN AUTO: 30.4 PG (ref 26–34)
MCHC RBC AUTO-ENTMCNC: 31.7 G/DL (ref 31–36)
MCV RBC AUTO: 95.8 FL (ref 80–100)
MONOCYTES # BLD: 0.8 K/UL (ref 0–1.3)
MONOCYTES NFR BLD: 6.9 %
NEUTROPHILS # BLD: 8.7 K/UL (ref 1.7–7.7)
NEUTROPHILS NFR BLD: 71.2 %
NITRITE UR QL STRIP.AUTO: NEGATIVE
NT-PROBNP SERPL-MCNC: 850 PG/ML (ref 0–449)
PH UR STRIP.AUTO: 6 [PH] (ref 5–8)
PLATELET # BLD AUTO: 269 K/UL (ref 135–450)
PMV BLD AUTO: 7.9 FL (ref 5–10.5)
POTASSIUM SERPL-SCNC: 4 MMOL/L (ref 3.5–5.1)
PROT SERPL-MCNC: 6 G/DL (ref 6.4–8.2)
PROT UR STRIP.AUTO-MCNC: NEGATIVE MG/DL
PROTHROMBIN TIME: 15.5 SEC (ref 11.9–14.9)
RBC # BLD AUTO: 3.86 M/UL (ref 4–5.2)
SARS-COV-2 RNA RESP QL NAA+PROBE: NOT DETECTED
SODIUM SERPL-SCNC: 140 MMOL/L (ref 136–145)
SP GR UR STRIP.AUTO: 1.02 (ref 1–1.03)
TROPONIN, HIGH SENSITIVITY: 59 NG/L (ref 0–14)
TROPONIN, HIGH SENSITIVITY: 61 NG/L (ref 0–14)
TROPONIN, HIGH SENSITIVITY: 63 NG/L (ref 0–14)
TROPONIN, HIGH SENSITIVITY: 70 NG/L (ref 0–14)
UA COMPLETE W REFLEX CULTURE PNL UR: NORMAL
UA DIPSTICK W REFLEX MICRO PNL UR: NORMAL
URN SPEC COLLECT METH UR: NORMAL
UROBILINOGEN UR STRIP-ACNC: 0.2 E.U./DL
WBC # BLD AUTO: 12.2 K/UL (ref 4–11)

## 2024-12-01 PROCEDURE — 85730 THROMBOPLASTIN TIME PARTIAL: CPT

## 2024-12-01 PROCEDURE — 6360000002 HC RX W HCPCS: Performed by: INTERNAL MEDICINE

## 2024-12-01 PROCEDURE — 93005 ELECTROCARDIOGRAM TRACING: CPT | Performed by: EMERGENCY MEDICINE

## 2024-12-01 PROCEDURE — 71045 X-RAY EXAM CHEST 1 VIEW: CPT

## 2024-12-01 PROCEDURE — 87636 SARSCOV2 & INF A&B AMP PRB: CPT

## 2024-12-01 PROCEDURE — 87040 BLOOD CULTURE FOR BACTERIA: CPT

## 2024-12-01 PROCEDURE — 84484 ASSAY OF TROPONIN QUANT: CPT

## 2024-12-01 PROCEDURE — 80053 COMPREHEN METABOLIC PANEL: CPT

## 2024-12-01 PROCEDURE — 2700000000 HC OXYGEN THERAPY PER DAY

## 2024-12-01 PROCEDURE — 2580000003 HC RX 258: Performed by: INTERNAL MEDICINE

## 2024-12-01 PROCEDURE — 96366 THER/PROPH/DIAG IV INF ADDON: CPT

## 2024-12-01 PROCEDURE — 93010 ELECTROCARDIOGRAM REPORT: CPT | Performed by: INTERNAL MEDICINE

## 2024-12-01 PROCEDURE — 94640 AIRWAY INHALATION TREATMENT: CPT

## 2024-12-01 PROCEDURE — 83735 ASSAY OF MAGNESIUM: CPT

## 2024-12-01 PROCEDURE — 85025 COMPLETE CBC W/AUTO DIFF WBC: CPT

## 2024-12-01 PROCEDURE — 70450 CT HEAD/BRAIN W/O DYE: CPT

## 2024-12-01 PROCEDURE — 81003 URINALYSIS AUTO W/O SCOPE: CPT

## 2024-12-01 PROCEDURE — 99285 EMERGENCY DEPT VISIT HI MDM: CPT

## 2024-12-01 PROCEDURE — 6370000000 HC RX 637 (ALT 250 FOR IP): Performed by: PHYSICIAN ASSISTANT

## 2024-12-01 PROCEDURE — 94761 N-INVAS EAR/PLS OXIMETRY MLT: CPT

## 2024-12-01 PROCEDURE — 36415 COLL VENOUS BLD VENIPUNCTURE: CPT

## 2024-12-01 PROCEDURE — 6370000000 HC RX 637 (ALT 250 FOR IP): Performed by: INTERNAL MEDICINE

## 2024-12-01 PROCEDURE — 6360000002 HC RX W HCPCS: Performed by: PHYSICIAN ASSISTANT

## 2024-12-01 PROCEDURE — 1200000000 HC SEMI PRIVATE

## 2024-12-01 PROCEDURE — 96365 THER/PROPH/DIAG IV INF INIT: CPT

## 2024-12-01 PROCEDURE — 83880 ASSAY OF NATRIURETIC PEPTIDE: CPT

## 2024-12-01 PROCEDURE — 85610 PROTHROMBIN TIME: CPT

## 2024-12-01 RX ORDER — GABAPENTIN 100 MG/1
100 CAPSULE ORAL 3 TIMES DAILY
Status: DISCONTINUED | OUTPATIENT
Start: 2024-12-01 | End: 2024-12-06 | Stop reason: HOSPADM

## 2024-12-01 RX ORDER — LORAZEPAM 0.5 MG/1
0.5 TABLET ORAL EVERY 8 HOURS PRN
Status: DISCONTINUED | OUTPATIENT
Start: 2024-12-01 | End: 2024-12-06 | Stop reason: HOSPADM

## 2024-12-01 RX ORDER — ESCITALOPRAM OXALATE 10 MG/1
10 TABLET ORAL DAILY
Status: DISCONTINUED | OUTPATIENT
Start: 2024-12-02 | End: 2024-12-06 | Stop reason: HOSPADM

## 2024-12-01 RX ORDER — PANTOPRAZOLE SODIUM 40 MG/1
40 TABLET, DELAYED RELEASE ORAL 2 TIMES DAILY
Status: DISCONTINUED | OUTPATIENT
Start: 2024-12-01 | End: 2024-12-04

## 2024-12-01 RX ORDER — BUDESONIDE 0.5 MG/2ML
0.5 INHALANT ORAL
Status: DISCONTINUED | OUTPATIENT
Start: 2024-12-01 | End: 2024-12-06 | Stop reason: HOSPADM

## 2024-12-01 RX ORDER — IPRATROPIUM BROMIDE AND ALBUTEROL SULFATE 2.5; .5 MG/3ML; MG/3ML
1 SOLUTION RESPIRATORY (INHALATION) ONCE
Status: COMPLETED | OUTPATIENT
Start: 2024-12-01 | End: 2024-12-01

## 2024-12-01 RX ORDER — METOPROLOL TARTRATE 25 MG/1
25 TABLET, FILM COATED ORAL 2 TIMES DAILY
Status: DISCONTINUED | OUTPATIENT
Start: 2024-12-01 | End: 2024-12-06 | Stop reason: HOSPADM

## 2024-12-01 RX ORDER — SUCRALFATE 1 G/1
1 TABLET ORAL 4 TIMES DAILY
Status: DISCONTINUED | OUTPATIENT
Start: 2024-12-01 | End: 2024-12-01

## 2024-12-01 RX ORDER — LACTOBACILLUS RHAMNOSUS GG 10B CELL
1 CAPSULE ORAL
Status: DISCONTINUED | OUTPATIENT
Start: 2024-12-02 | End: 2024-12-06 | Stop reason: HOSPADM

## 2024-12-01 RX ORDER — POLYETHYLENE GLYCOL 3350 17 G/17G
17 POWDER, FOR SOLUTION ORAL DAILY PRN
Status: DISCONTINUED | OUTPATIENT
Start: 2024-12-01 | End: 2024-12-06 | Stop reason: HOSPADM

## 2024-12-01 RX ORDER — ONDANSETRON 4 MG/1
4 TABLET, ORALLY DISINTEGRATING ORAL EVERY 8 HOURS PRN
Status: DISCONTINUED | OUTPATIENT
Start: 2024-12-01 | End: 2024-12-06 | Stop reason: HOSPADM

## 2024-12-01 RX ORDER — BUSPIRONE HYDROCHLORIDE 5 MG/1
10 TABLET ORAL EVERY 6 HOURS PRN
Status: DISCONTINUED | OUTPATIENT
Start: 2024-12-01 | End: 2024-12-06 | Stop reason: HOSPADM

## 2024-12-01 RX ORDER — MAGNESIUM SULFATE IN WATER 40 MG/ML
2000 INJECTION, SOLUTION INTRAVENOUS ONCE
Status: COMPLETED | OUTPATIENT
Start: 2024-12-01 | End: 2024-12-01

## 2024-12-01 RX ORDER — ACETAMINOPHEN 650 MG/1
650 SUPPOSITORY RECTAL EVERY 6 HOURS PRN
Status: DISCONTINUED | OUTPATIENT
Start: 2024-12-01 | End: 2024-12-06 | Stop reason: HOSPADM

## 2024-12-01 RX ORDER — CIPROFLOXACIN AND DEXAMETHASONE 3; 1 MG/ML; MG/ML
4 SUSPENSION/ DROPS AURICULAR (OTIC) 2 TIMES DAILY
Status: DISCONTINUED | OUTPATIENT
Start: 2024-12-01 | End: 2024-12-06 | Stop reason: HOSPADM

## 2024-12-01 RX ORDER — GUAIFENESIN 600 MG/1
600 TABLET, EXTENDED RELEASE ORAL DAILY
Status: DISCONTINUED | OUTPATIENT
Start: 2024-12-01 | End: 2024-12-06 | Stop reason: HOSPADM

## 2024-12-01 RX ORDER — SODIUM CHLORIDE 0.9 % (FLUSH) 0.9 %
5-40 SYRINGE (ML) INJECTION EVERY 12 HOURS SCHEDULED
Status: DISCONTINUED | OUTPATIENT
Start: 2024-12-01 | End: 2024-12-06 | Stop reason: HOSPADM

## 2024-12-01 RX ORDER — SODIUM CHLORIDE 0.9 % (FLUSH) 0.9 %
5-40 SYRINGE (ML) INJECTION PRN
Status: DISCONTINUED | OUTPATIENT
Start: 2024-12-01 | End: 2024-12-06 | Stop reason: HOSPADM

## 2024-12-01 RX ORDER — ONDANSETRON 2 MG/ML
4 INJECTION INTRAMUSCULAR; INTRAVENOUS EVERY 6 HOURS PRN
Status: DISCONTINUED | OUTPATIENT
Start: 2024-12-01 | End: 2024-12-06 | Stop reason: HOSPADM

## 2024-12-01 RX ORDER — LORAZEPAM 0.5 MG/1
0.5 TABLET ORAL EVERY 6 HOURS PRN
Status: DISCONTINUED | OUTPATIENT
Start: 2024-12-01 | End: 2024-12-01

## 2024-12-01 RX ORDER — ACETAMINOPHEN 325 MG/1
650 TABLET ORAL EVERY 6 HOURS PRN
Status: DISCONTINUED | OUTPATIENT
Start: 2024-12-01 | End: 2024-12-06 | Stop reason: HOSPADM

## 2024-12-01 RX ORDER — SODIUM CHLORIDE 9 MG/ML
INJECTION, SOLUTION INTRAVENOUS PRN
Status: DISCONTINUED | OUTPATIENT
Start: 2024-12-01 | End: 2024-12-06 | Stop reason: HOSPADM

## 2024-12-01 RX ORDER — TORSEMIDE 20 MG/1
20 TABLET ORAL DAILY
Status: DISCONTINUED | OUTPATIENT
Start: 2024-12-02 | End: 2024-12-05

## 2024-12-01 RX ORDER — FLUTICASONE PROPIONATE 50 MCG
1 SPRAY, SUSPENSION (ML) NASAL DAILY PRN
Status: DISCONTINUED | OUTPATIENT
Start: 2024-12-01 | End: 2024-12-06 | Stop reason: HOSPADM

## 2024-12-01 RX ORDER — ROFLUMILAST 500 UG/1
500 TABLET ORAL DAILY
Status: DISCONTINUED | OUTPATIENT
Start: 2024-12-02 | End: 2024-12-06 | Stop reason: HOSPADM

## 2024-12-01 RX ORDER — ALBUTEROL SULFATE 0.83 MG/ML
2.5 SOLUTION RESPIRATORY (INHALATION) EVERY 4 HOURS PRN
Status: DISCONTINUED | OUTPATIENT
Start: 2024-12-01 | End: 2024-12-06 | Stop reason: HOSPADM

## 2024-12-01 RX ORDER — ALBUTEROL SULFATE 0.83 MG/ML
2.5 SOLUTION RESPIRATORY (INHALATION)
Status: DISCONTINUED | OUTPATIENT
Start: 2024-12-01 | End: 2024-12-06 | Stop reason: HOSPADM

## 2024-12-01 RX ORDER — ATORVASTATIN CALCIUM 40 MG/1
40 TABLET, FILM COATED ORAL NIGHTLY
Status: DISCONTINUED | OUTPATIENT
Start: 2024-12-01 | End: 2024-12-06 | Stop reason: HOSPADM

## 2024-12-01 RX ORDER — OFLOXACIN 3 MG/ML
5 SOLUTION AURICULAR (OTIC) 2 TIMES DAILY
Status: DISCONTINUED | OUTPATIENT
Start: 2024-12-01 | End: 2024-12-01

## 2024-12-01 RX ORDER — LEVOTHYROXINE SODIUM 25 UG/1
25 TABLET ORAL DAILY
Status: DISCONTINUED | OUTPATIENT
Start: 2024-12-02 | End: 2024-12-06 | Stop reason: HOSPADM

## 2024-12-01 RX ORDER — FERROUS SULFATE 325(65) MG
325 TABLET ORAL
Status: DISCONTINUED | OUTPATIENT
Start: 2024-12-02 | End: 2024-12-04

## 2024-12-01 RX ADMIN — ACETAMINOPHEN 650 MG: 325 TABLET ORAL at 17:02

## 2024-12-01 RX ADMIN — APIXABAN 2.5 MG: 2.5 TABLET, FILM COATED ORAL at 19:38

## 2024-12-01 RX ADMIN — GABAPENTIN 100 MG: 100 CAPSULE ORAL at 16:15

## 2024-12-01 RX ADMIN — ALBUTEROL SULFATE 2.5 MG: 2.5 SOLUTION RESPIRATORY (INHALATION) at 19:04

## 2024-12-01 RX ADMIN — PANTOPRAZOLE SODIUM 40 MG: 40 TABLET, DELAYED RELEASE ORAL at 19:37

## 2024-12-01 RX ADMIN — MAGNESIUM SULFATE HEPTAHYDRATE 2000 MG: 40 INJECTION, SOLUTION INTRAVENOUS at 10:45

## 2024-12-01 RX ADMIN — GABAPENTIN 100 MG: 100 CAPSULE ORAL at 19:37

## 2024-12-01 RX ADMIN — IPRATROPIUM BROMIDE AND ALBUTEROL SULFATE 1 DOSE: 2.5; .5 SOLUTION RESPIRATORY (INHALATION) at 09:39

## 2024-12-01 RX ADMIN — GUAIFENESIN 600 MG: 600 TABLET ORAL at 16:15

## 2024-12-01 RX ADMIN — ALBUTEROL SULFATE 2.5 MG: 2.5 SOLUTION RESPIRATORY (INHALATION) at 16:51

## 2024-12-01 RX ADMIN — BUSPIRONE HYDROCHLORIDE 10 MG: 5 TABLET ORAL at 16:15

## 2024-12-01 RX ADMIN — METOPROLOL TARTRATE 25 MG: 25 TABLET, FILM COATED ORAL at 19:38

## 2024-12-01 RX ADMIN — SODIUM CHLORIDE, PRESERVATIVE FREE 10 ML: 5 INJECTION INTRAVENOUS at 19:38

## 2024-12-01 RX ADMIN — ATORVASTATIN CALCIUM 40 MG: 40 TABLET, FILM COATED ORAL at 19:38

## 2024-12-01 RX ADMIN — BUDESONIDE 500 MCG: 0.5 SUSPENSION RESPIRATORY (INHALATION) at 19:09

## 2024-12-01 ASSESSMENT — PAIN SCALES - GENERAL
PAINLEVEL_OUTOF10: 5
PAINLEVEL_OUTOF10: 8

## 2024-12-01 ASSESSMENT — PAIN - FUNCTIONAL ASSESSMENT
PAIN_FUNCTIONAL_ASSESSMENT: NONE - DENIES PAIN
PAIN_FUNCTIONAL_ASSESSMENT: 0-10
PAIN_FUNCTIONAL_ASSESSMENT: NONE - DENIES PAIN
PAIN_FUNCTIONAL_ASSESSMENT: NONE - DENIES PAIN

## 2024-12-01 ASSESSMENT — PAIN DESCRIPTION - LOCATION: LOCATION: HEAD

## 2024-12-01 NOTE — PLAN OF CARE
PLAN OF CARE    Received request for inpatient admission. Admitting provider Dr. Nazario notified.    CHELSEA BAUER MD  12/1/2024  12:49 PM

## 2024-12-01 NOTE — ED PROVIDER NOTES
[facility]  ED  EMERGENCY DEPARTMENT ENCOUNTER        Pt Name: Terri Smith  MRN: [id number]  Birthdate 1940  Date of evaluation: 2024  Provider: Dane Rosa PA-C  PCP: Jackson Ontiveros MD  Note Started: 8:47 AM EST 24       I have seen and evaluated this patient with my supervising physician ARTHUR SANTANA .      CHIEF COMPLAINT       Chief Complaint   Patient presents with    Dizziness     Patient has been feeling dizzy for a couple days. Denies chest pain and SOB.        HISTORY OF PRESENT ILLNESS: 1 or more Elements     History From: patient  Limitations to history : None    Terri Smith is a 84 y.o. female who presents to the emergency department with a chief complaint of \"feeling heavy.\"  Patient has been feeling like this for the past 2 to 3 days.  Has history of COPD chronically on oxygen, hyperlipidemia, hypertension, CKD, chronic anxiety, coronary artery disease, CHF.  Anticoagulated on Eliquis with history of atrial fibrillation.  She states that she has felt like \"wax is running out of my right ear.\"  Due to this she states she has been unable to ambulate and just feels \"heavy.\"  She denies chest pain, abdominal pain or any pain at all.  Has some chronic shortness of breath.  Denies dysuria, hematuria, diarrhea, bloody stool.  Patient states since this began she is only been able to sit up in bed to take sips of water.  Has been unable to ambulate.  She lives at home with her son.    Nursing Notes were all reviewed and agreed with or any disagreements were addressed in the HPI.    REVIEW OF SYSTEMS :      Review of Systems    Positives and Pertinent negatives as per HPI.     SURGICAL HISTORY     Past Surgical History:   Procedure Laterality Date    BRONCHOSCOPY  2019    Dr. Wheatley - w/BAL    CARDIAC CATHETERIZATION  2019    Dr. Palomares     SECTION      COLONOSCOPY N/A 2020    COLONOSCOPY DIAGNOSTIC performed  administration in time range)   ondansetron (ZOFRAN-ODT) disintegrating tablet 4 mg (has no administration in time range)     Or   ondansetron (ZOFRAN) injection 4 mg (has no administration in time range)   acetaminophen (TYLENOL) tablet 650 mg (has no administration in time range)     Or   acetaminophen (TYLENOL) suppository 650 mg (has no administration in time range)   polyethylene glycol (GLYCOLAX) packet 17 g (has no administration in time range)   ipratropium 0.5 mg-albuterol 2.5 mg (DUONEB) nebulizer solution 1 Dose (1 Dose Inhalation Given 12/1/24 9866)   magnesium sulfate 2000 mg in 50 mL IVPB premix (0 mg IntraVENous Stopped 12/1/24 3657)                 Is this patient to be included in the SEP-1 Core Measure due to severe sepsis or septic shock?   No   Exclusion criteria - the patient is NOT to be included for SEP-1 Core Measure due to:  Infection is not suspected    Chronic Conditions affecting care:    has a past medical history of NADJA (acute kidney injury) ([facility]), Asthma, Atrial fibrillation with RVR ([facility]), CAD (coronary artery disease), CHF (congestive heart failure) ([facility]), Chronic anxiety, COPD (chronic obstructive pulmonary disease) ([facility]), COPD (chronic obstructive pulmonary disease) ([facility]) (08/19/2023), GERD (gastroesophageal reflux disease) (09/09/2021), Heart attack ([facility]) (2019), History of blood transfusion, Hyperlipidemia, Hypertension, MRSA (methicillin resistant staph aureus) culture positive (09/22/2019), OA (osteoarthritis) (08/12/2014), and Thyroid disease.    CONSULTS: (Who and What was discussed)  IP CONSULT TO HOSPITALIST  Katarzyna Villafana -internal medicine    Social Determinants Significantly Affecting Health : None    Records Reviewed (External and Source)     CC/HPI Summary, DDx, ED Course, and Reassessment: Patient presented with some dizziness and ambulatory dysfunction.  Family member that lives with her later presented to the emergency department and states normally she is able to  help assist them in getting up and moving around but for the past day or 2 she has been unable to even assist them and is just been lying in bed.  She is anticoagulated on Eliquis with symptoms that been ongoing for over 24 hours with an NIH of 0 no focal finding.  Not a TNK candidate.  Has some baseline chronic kidney disease that is actually better today with a creatinine of 1.3 but given the symptoms over 24 hours and her CKD did not do CTA of the head or neck.  It appears she had similar symptoms with dizziness last December but never followed up with MRI.  Troponin is slightly elevated around her baseline that is in the 50s and 60s.  She denies any pain.  Has some chronic shortness of breath and wheezing with COPD.  Received nebulizer here.  Urinalysis is unremarkable.  Has mild leukocytosis of 12.2.  Magnesium is low at 1.56 and received some IV magnesium.  She is COVID and flu negative.  CT imaging of the head is unremarkable.  X-ray imaging does reveal some stable COPD.  Given her ambulatory dysfunction and her being unable to ambulate or even help care for herself with some possible dizziness discussed with internal medicine who admit for further workup and treatment.  Low suspicion for sepsis, acute abdomen or other emergent etiology.  Patient was stable to have admission.    Disposition Considerations (tests considered but not done, Admit vs D/C, Shared Decision Making, Pt Expectation of Test or Tx.):        I am the Primary Clinician of Record.  FINAL IMPRESSION      1. Dizziness    2. Ambulatory dysfunction    3. Chronic obstructive pulmonary disease, unspecified COPD type (HCC)    4. Hypomagnesemia    5. Chronic kidney disease, unspecified CKD stage          DISPOSITION/PLAN     DISPOSITION Admitted 12/01/2024 01:40:53 PM           PATIENT REFERRED TO:  No follow-up provider specified.    DISCHARGE MEDICATIONS:  New Prescriptions    No medications on file       DISCONTINUED MEDICATIONS:  Discontinued

## 2024-12-01 NOTE — PROGRESS NOTES
4 Eyes Skin Assessment and Patient belongings     The patient is being assess for  Admission    I agree that 2 Nurses have performed a thorough Head to Toe Skin Assessment on the patient. ALL assessment sites listed below have been assessed.       Areas assessed by both nurses:   [x]   Head, Face, and Ears   [x]   Shoulders, Back, and Chest  [x]   Arms, Elbows, and Hands   [x]   Coccyx, Sacrum, and IschIum  [x]   Legs, Feet, and Heels        Does the Patient have Skin Breakdown?  No         Trey Prevention initiated:  Yes   Wound Care Orders initiated:  No      [facility] nurse consulted for Pressure Injury (Stage 3,4, Unstageable, DTI, NWPT, and Complex wounds), New and Established Ostomies:  No      I agree that 2 Nurses have reviewed patient belongings with the patient/family and documented in the flowsheet upon admission or transfer to the unit.     Belongings  Dental Appliances: None  Vision - Corrective Lenses: None  Hearing Aid: None  Clothing: Pajamas, Slippers  Jewelry: None  Body Piercings Removed: No  Electronic Devices: Other (Comment), Cell Phone (APAP MACHINE, BLANKETS)  Weapons (Notify Protective Services/Security): None  Other Valuables: Cpap/BiPap  Home Medications: None  Valuables Given To: Patient       Nurse 1 eSignature: Electronically signed by Heather Gama RN on 12/1/24 at 4:05 PM EST    **SHARE this note so that the co-signing nurse is able to place an eSignature**    Nurse 2 eSignature: Electronically signed by LORE GIVENS RN on 12/1/24 at 5:00 PM EST

## 2024-12-01 NOTE — ED NOTES
Terri Smith is a 84 y.o. female admitted for  Principal Problem:    Vertigo  Resolved Problems:    * No resolved hospital problems. *  .   Patient Home via EMS transportation with   Chief Complaint   Patient presents with    Dizziness     Patient has been feeling dizzy for a couple days. Denies chest pain and SOB.    .  Patient is alert and Person, Place, Time, and Situation  Patient's baseline mobility: Baseline Mobility: Walker  Code Status: Full Code   Cardiac Rhythm:    O2 Flow Rate (L/min): 3 L/min  Is patient on baseline Oxygen: yes, 3 how many Liters3:   Abnormal Assessment Findings:     Isolation: None      NIH Score:    C-SSRS: Risk of Suicide: No Risk  Bedside swallow:        Active LDA's:   Peripheral IV 12/01/24 Right Antecubital (Active)   Site Assessment Clean, dry & intact 12/01/24 0852   Line Status Blood return noted;Brisk blood return;Flushed;Normal saline locked;Specimen collected 12/01/24 0852   Phlebitis Assessment No symptoms 12/01/24 0852   Infiltration Assessment 0 12/01/24 0852   Dressing Status New dressing applied;Clean, dry & intact 12/01/24 0852   Dressing Type Transparent 12/01/24 0852   Dressing Intervention New 12/01/24 0852     Patient admitted with a bond:  If the bond is chronic was it exchanged:  Reason for bond:   Patient admitted with Central Line:  . PICC line placement confirmed: YES OR NO:[id number]}   Reason for Central line:   Was central line Inserted from an outside facility:        Family/Caregiver Present yes Any Concerns: no   Restraints no  Sitter no         Vitals: MEWS Score: 3    Vitals:    12/01/24 1028 12/01/24 1152 12/01/24 1238 12/01/24 1259   BP: 101/66 129/66 130/71 110/69   Pulse: 98 93 94 92   Resp: 26 29 27 24   Temp:       TempSrc:       SpO2: 96% 100% 95% 100%   Weight:           Last documented pain score (0-10 scale)    Pain medication administered No.    Pertinent or High Risk Medications/Drips: No.    Pending Blood Product Administration:

## 2024-12-01 NOTE — RT PROTOCOL NOTE
RT Inhaler-Nebulizer Bronchodilator Protocol Note    There is a bronchodilator order in the chart from a provider indicating to follow the RT Bronchodilator Protocol and there is an “Initiate RT Inhaler-Nebulizer Bronchodilator Protocol” order as well (see protocol at bottom of note).    CXR Findings:  XR CHEST PORTABLE    Result Date: 12/1/2024  COPD without acute abnormality.       The findings from the last RT Protocol Assessment were as follows:   History Pulmonary Disease: Chronic pulmonary disease  Respiratory Pattern: Regular pattern and RR 12-20 bpm  Breath Sounds: Slightly diminished and/or crackles  Cough: Strong, spontaneous, non-productive  Indication for Bronchodilator Therapy: On home bronchodilators  Bronchodilator Assessment Score: 4    Aerosolized bronchodilator medication orders have been revised according to the RT Inhaler-Nebulizer Bronchodilator Protocol below.    Respiratory Therapist to perform RT Therapy Protocol Assessment initially then follow the protocol.  Repeat RT Therapy Protocol Assessment PRN for score 0-3 or on second treatment, BID, and PRN for scores above 3.    No Indications - adjust the frequency to every 6 hours PRN wheezing or bronchospasm, if no treatments needed after 48 hours then discontinue using Per Protocol order mode.     If indication present, adjust the RT bronchodilator orders based on the Bronchodilator Assessment Score as indicated below.  Use Inhaler orders unless patient has one or more of the following: on home nebulizer, not able to hold breath for 10 seconds, is not alert and oriented, cannot activate and use MDI correctly, or respiratory rate 25 breaths per minute or more, then use the equivalent nebulizer order(s) with same Frequency and PRN reasons based on the score.  If a patient is on this medication at home then do not decrease Frequency below that used at home.    0-3 - enter or revise RT bronchodilator order(s) to equivalent RT Bronchodilator order

## 2024-12-01 NOTE — ED PROVIDER NOTES
I independently evaluated and obtained a history and physical on Terri Smith. I personally saw the patient and made/approved the management plan and take responsibility for the patient management.    All diagnostic, treatment, and disposition assistants were made to myself in conjunction the advanced practice provider.    For further details of this patient's emergency department encounter, please see the advanced practice provider's documentation.    History: This patient states since Friday she has been having episodes of feeling dizzy and also \"feeling heavy \".  The patient states she has a history of COPD, hyperlipidemia, hypertension, chronic kidney disease, anxiety, coronary artery disease and CHF.  She is on a DOAC due to her history of A-fib.  She states that she has had some issues with earwax and just has not felt well and is been feeling poorly.  She states with his dizziness she has not been able to ambulate well she is only been able to sit up in bed and take sips of water.    Physician Exam:   PHYSICAL EXAM  /68   Pulse 96   Temp 99.2 °F (37.3 °C) (Oral)   Resp 18   Wt 38.6 kg (85 lb 3.2 oz)   SpO2 98%   BMI 14.62 kg/m²   GENERAL APPEARANCE: Awake and alert. Well appearing. No acute distress.  HEAD: Normocephalic. Atraumatic.  CHEST: Equal symmetric chest rise.  LUNGS: Breathing is unlabored. Speaking comfortably in full sentences.   EXTREMITIES: No deformities. Non-tender.  SKIN: Warm and dry.  No rash  NEUROLOGICAL: Normal speech and thought      The Ekg interpreted by me shows  normal sinus rhythm with a rate of 103  Axis is   88  QTc is  normal  Intervals and Durations are unremarkable.      ST Segments: T wave changes inferior leads  No significant change from prior EKG dated 10/11/24      I personally saw the patient and performed a substantive portion of the visit including all aspects of the medical decision making.    MDM:     DDX: Posterior circulation CVA, vertigo,

## 2024-12-01 NOTE — H&P
Hospital Medicine History & Physical      Date of Admission: 12/1/2024    Date of Service:  Pt seen/examined on  12/1/2024    [x]Admitted to Inpatient with expected LOS greater than two midnights due to medical therapy.  []Placed in Observation status.    Chief Admission Complaint: Weakness, dizzy.  Difficulty walking    Presenting Admission History:      84 y.o. female  with PMHx significant for  COPD/on home oxygen 2 liters, HFpEF, CKD, paroxysmal A-fib/on long-term anticoagulation with Eliquis, hypertension, hyperlipidemia, anxiety/depression  has been on long term BZO.  She presented to [facility] ED with complaint of increased weakness, dizziness and difficulty walking.    Of pertinence is she had a recent admission to [facility] from 11/21/2024 to 11/25/2024.   She presented that admission with chest congestion, cough and shortness of breath.  She states she was doing well until the past 2 days when she developed increased dizziness, difficulty with walking.  Her daughter who is at the bedside also states she has not been eating or drinking well for the past 2 days.  No complaint of chest pain, no shortness of breath, no fever or chills.  She is now being admitted for further evaluation and management.    Assessment/Plan:      Current Principal Problem:  Vertigo    Weakness and deconditioning : Daughter did report she was having increased weakness and difficulty ambulating.  Also noted not eating or drinking well for the past few days.  Per daughter she normally walks without use of any  assist device such as a cane or walker.  Will have PT/OT evaluate      Vertigo : She states her dizziness and lightheadedness seems to be improving.  As noted she was not eating or drinking well for the past 2 days.  Magnesium was noted to be low in the ED and has been repleted.  Will continue to follow, will give gentle hydration with IV fluid.      COPD: She is normally at home oxygen 2 L continuous.  Will  9:21 am TECHNIQUE: CT of the head was performed without the administration of intravenous contrast. Automated exposure control, iterative reconstruction, and/or weight based adjustment of the mA/kV was utilized to reduce the radiation dose to as low as reasonably achievable. COMPARISON: 12/15/2023 HISTORY: ORDERING SYSTEM PROVIDED HISTORY: weakness TECHNOLOGIST PROVIDED HISTORY: Reason for exam:->weakness Has a \"code stroke\" or \"stroke alert\" been called?->No Decision Support Exception - unselect if not a suspected or confirmed emergency medical condition->Emergency Medical Condition (MA) Reason for Exam: weakness and dizziness FINDINGS: BRAIN/VENTRICLES: There is no acute intracranial hemorrhage, mass effect or midline shift. No abnormal extra-axial fluid collection.  The gray-white differentiation is maintained without evidence of an acute infarct. There is prominence of the ventricles and sulci due to global parenchymal volume loss. There are nonspecific areas of hypoattenuation within the periventricular and subcortical white matter, which likely represent chronic microvascular ischemic change.  Physiologic calcification is present within the basal ganglia. ORBITS: The visualized portion of the orbits demonstrate no acute abnormality. SINUSES: The visualized paranasal sinuses and mastoid air cells demonstrate no acute abnormality. SOFT TISSUES/SKULL: No acute abnormality of the visualized skull or soft tissues.     No acute intracranial abnormality.     XR CHEST PORTABLE    Result Date: 12/1/2024  EXAMINATION: ONE XRAY VIEW OF THE CHEST 12/1/2024 8:42 am COMPARISON: 11/23/2024 HISTORY: ORDERING SYSTEM PROVIDED HISTORY: weakness TECHNOLOGIST PROVIDED HISTORY: Reason for exam:->weakness Reason for Exam: weakness and SOB FINDINGS: The lungs are hyperexpanded without focal consolidation.  Heart size and vascularity are stable.  There is no pneumothorax or effusion.     COPD without acute abnormality.     XR CHEST (2  VW)    Result Date: 2024  EXAMINATION: TWO XRAY VIEWS OF THE CHEST 2024 5:16 pm COMPARISON: 2024 HISTORY: ORDERING SYSTEM PROVIDED HISTORY: CHF TECHNOLOGIST PROVIDED HISTORY: Reason for exam:->CHF FINDINGS: Heart size is normal  Aorta is normal.  Lungs are normally expanded and clear. No pleural effusions. Mild spondylosis     Lungs are clear     XR CHEST PORTABLE    Result Date: 2024  EXAMINATION: ONE XRAY VIEW OF THE CHEST 2024 2:24 pm COMPARISON: 10/11/2024 HISTORY: ORDERING SYSTEM PROVIDED HISTORY: SOB TECHNOLOGIST PROVIDED HISTORY: Reason for exam:->SOB FINDINGS: The lungs are without acute focal process.  There is no effusion or pneumothorax. The cardiomediastinal silhouette is stable. The osseous structures are stable.     No acute process.       PCP: Jackson Ontiveros MD    Past Medical History:        Diagnosis Date    NADJA (acute kidney injury) ([facility])     Asthma     Atrial fibrillation with RVR (HCC)     CAD (coronary artery disease)     CHF (congestive heart failure) (HCC)     unsure    Chronic anxiety     COPD (chronic obstructive pulmonary disease) (HCC)     COPD (chronic obstructive pulmonary disease) (HCC) 2023    GERD (gastroesophageal reflux disease) 2021    Heart attack (HCC) 2019    History of blood transfusion     Hyperlipidemia     Hypertension     MRSA (methicillin resistant staph aureus) culture positive 2019    + resp cx    OA (osteoarthritis) 2014    Thyroid disease        Past Surgical History:        Procedure Laterality Date    BRONCHOSCOPY  2019    Dr. Wheatley - w/BAL    CARDIAC CATHETERIZATION  2019    Dr. Palomares     SECTION      COLONOSCOPY N/A 2020    COLONOSCOPY DIAGNOSTIC performed by Rowdy Schmitz DO at [facility] ENDOSCOPY    COLONOSCOPY N/A 10/22/2021    COLONOSCOPY POLYPECTOMY SNARE/COLD BIOPSY performed by Celestine Lester MD at [facility] ENDOSCOPY    COLONOSCOPY N/A 2023

## 2024-12-02 LAB
ANION GAP SERPL CALCULATED.3IONS-SCNC: 10 MMOL/L (ref 3–16)
BUN SERPL-MCNC: 27 MG/DL (ref 7–20)
CALCIUM SERPL-MCNC: 9.7 MG/DL (ref 8.3–10.6)
CHLORIDE SERPL-SCNC: 100 MMOL/L (ref 99–110)
CO2 SERPL-SCNC: 31 MMOL/L (ref 21–32)
CREAT SERPL-MCNC: 1.5 MG/DL (ref 0.6–1.2)
DEPRECATED RDW RBC AUTO: 14.1 % (ref 12.4–15.4)
GFR SERPLBLD CREATININE-BSD FMLA CKD-EPI: 34 ML/MIN/{1.73_M2}
GLUCOSE SERPL-MCNC: 120 MG/DL (ref 70–99)
HCT VFR BLD AUTO: 32.4 % (ref 36–48)
HGB BLD-MCNC: 10.2 G/DL (ref 12–16)
MAGNESIUM SERPL-MCNC: 2.54 MG/DL (ref 1.8–2.4)
MCH RBC QN AUTO: 30.2 PG (ref 26–34)
MCHC RBC AUTO-ENTMCNC: 31.4 G/DL (ref 31–36)
MCV RBC AUTO: 96.2 FL (ref 80–100)
PLATELET # BLD AUTO: 236 K/UL (ref 135–450)
PMV BLD AUTO: 8 FL (ref 5–10.5)
POTASSIUM SERPL-SCNC: 4.2 MMOL/L (ref 3.5–5.1)
RBC # BLD AUTO: 3.37 M/UL (ref 4–5.2)
SODIUM SERPL-SCNC: 141 MMOL/L (ref 136–145)
WBC # BLD AUTO: 10.9 K/UL (ref 4–11)

## 2024-12-02 PROCEDURE — 6370000000 HC RX 637 (ALT 250 FOR IP): Performed by: INTERNAL MEDICINE

## 2024-12-02 PROCEDURE — 1200000000 HC SEMI PRIVATE

## 2024-12-02 PROCEDURE — 6360000002 HC RX W HCPCS: Performed by: INTERNAL MEDICINE

## 2024-12-02 PROCEDURE — 94640 AIRWAY INHALATION TREATMENT: CPT

## 2024-12-02 PROCEDURE — 94761 N-INVAS EAR/PLS OXIMETRY MLT: CPT

## 2024-12-02 PROCEDURE — 97166 OT EVAL MOD COMPLEX 45 MIN: CPT

## 2024-12-02 PROCEDURE — 97530 THERAPEUTIC ACTIVITIES: CPT

## 2024-12-02 PROCEDURE — 36415 COLL VENOUS BLD VENIPUNCTURE: CPT

## 2024-12-02 PROCEDURE — 2580000003 HC RX 258: Performed by: INTERNAL MEDICINE

## 2024-12-02 PROCEDURE — 97162 PT EVAL MOD COMPLEX 30 MIN: CPT

## 2024-12-02 PROCEDURE — 2700000000 HC OXYGEN THERAPY PER DAY

## 2024-12-02 PROCEDURE — 80048 BASIC METABOLIC PNL TOTAL CA: CPT

## 2024-12-02 PROCEDURE — 83735 ASSAY OF MAGNESIUM: CPT

## 2024-12-02 PROCEDURE — 85027 COMPLETE CBC AUTOMATED: CPT

## 2024-12-02 RX ADMIN — ACETAMINOPHEN 650 MG: 325 TABLET ORAL at 23:08

## 2024-12-02 RX ADMIN — ALBUTEROL SULFATE 2.5 MG: 2.5 SOLUTION RESPIRATORY (INHALATION) at 07:51

## 2024-12-02 RX ADMIN — CIPROFLOXACIN AND DEXAMETHASONE 4 DROP: 3; 1 SUSPENSION/ DROPS AURICULAR (OTIC) at 20:25

## 2024-12-02 RX ADMIN — FERROUS SULFATE TAB 325 MG (65 MG ELEMENTAL FE) 325 MG: 325 (65 FE) TAB at 09:06

## 2024-12-02 RX ADMIN — ATORVASTATIN CALCIUM 40 MG: 40 TABLET, FILM COATED ORAL at 20:25

## 2024-12-02 RX ADMIN — GABAPENTIN 100 MG: 100 CAPSULE ORAL at 13:55

## 2024-12-02 RX ADMIN — GABAPENTIN 100 MG: 100 CAPSULE ORAL at 09:06

## 2024-12-02 RX ADMIN — APIXABAN 2.5 MG: 2.5 TABLET, FILM COATED ORAL at 09:05

## 2024-12-02 RX ADMIN — PANTOPRAZOLE SODIUM 40 MG: 40 TABLET, DELAYED RELEASE ORAL at 20:25

## 2024-12-02 RX ADMIN — BUDESONIDE 500 MCG: 0.5 SUSPENSION RESPIRATORY (INHALATION) at 19:12

## 2024-12-02 RX ADMIN — BUSPIRONE HYDROCHLORIDE 10 MG: 5 TABLET ORAL at 09:06

## 2024-12-02 RX ADMIN — LORAZEPAM 0.5 MG: 0.5 TABLET ORAL at 03:22

## 2024-12-02 RX ADMIN — BUDESONIDE 500 MCG: 0.5 SUSPENSION RESPIRATORY (INHALATION) at 07:51

## 2024-12-02 RX ADMIN — GABAPENTIN 100 MG: 100 CAPSULE ORAL at 20:25

## 2024-12-02 RX ADMIN — Medication 1 CAPSULE: at 09:05

## 2024-12-02 RX ADMIN — METOPROLOL TARTRATE 25 MG: 25 TABLET, FILM COATED ORAL at 20:25

## 2024-12-02 RX ADMIN — ACETAMINOPHEN 650 MG: 325 TABLET ORAL at 03:38

## 2024-12-02 RX ADMIN — SODIUM CHLORIDE, PRESERVATIVE FREE 5 ML: 5 INJECTION INTRAVENOUS at 09:06

## 2024-12-02 RX ADMIN — LORAZEPAM 0.5 MG: 0.5 TABLET ORAL at 13:55

## 2024-12-02 RX ADMIN — ONDANSETRON 4 MG: 2 INJECTION INTRAMUSCULAR; INTRAVENOUS at 02:41

## 2024-12-02 RX ADMIN — ALBUTEROL SULFATE 2.5 MG: 2.5 SOLUTION RESPIRATORY (INHALATION) at 19:10

## 2024-12-02 RX ADMIN — TORSEMIDE 20 MG: 20 TABLET ORAL at 09:05

## 2024-12-02 RX ADMIN — CIPROFLOXACIN AND DEXAMETHASONE 4 DROP: 3; 1 SUSPENSION/ DROPS AURICULAR (OTIC) at 09:07

## 2024-12-02 RX ADMIN — METOPROLOL TARTRATE 25 MG: 25 TABLET, FILM COATED ORAL at 09:06

## 2024-12-02 RX ADMIN — ROFLUMILAST 500 MCG: 500 TABLET ORAL at 09:05

## 2024-12-02 RX ADMIN — ESCITALOPRAM OXALATE 10 MG: 10 TABLET ORAL at 09:05

## 2024-12-02 RX ADMIN — SODIUM CHLORIDE, PRESERVATIVE FREE 10 ML: 5 INJECTION INTRAVENOUS at 20:26

## 2024-12-02 RX ADMIN — APIXABAN 2.5 MG: 2.5 TABLET, FILM COATED ORAL at 20:25

## 2024-12-02 RX ADMIN — GUAIFENESIN 600 MG: 600 TABLET ORAL at 09:05

## 2024-12-02 RX ADMIN — LEVOTHYROXINE SODIUM 25 MCG: 0.03 TABLET ORAL at 06:24

## 2024-12-02 RX ADMIN — PANTOPRAZOLE SODIUM 40 MG: 40 TABLET, DELAYED RELEASE ORAL at 09:05

## 2024-12-02 RX ADMIN — ALBUTEROL SULFATE 2.5 MG: 2.5 SOLUTION RESPIRATORY (INHALATION) at 15:42

## 2024-12-02 ASSESSMENT — PAIN SCALES - GENERAL
PAINLEVEL_OUTOF10: 3
PAINLEVEL_OUTOF10: 3
PAINLEVEL_OUTOF10: 0
PAINLEVEL_OUTOF10: 2
PAINLEVEL_OUTOF10: 0

## 2024-12-02 ASSESSMENT — PAIN DESCRIPTION - LOCATION
LOCATION: HEAD
LOCATION: HEAD

## 2024-12-02 ASSESSMENT — PAIN DESCRIPTION - PAIN TYPE: TYPE: ACUTE PAIN

## 2024-12-02 ASSESSMENT — PAIN - FUNCTIONAL ASSESSMENT: PAIN_FUNCTIONAL_ASSESSMENT: PREVENTS OR INTERFERES SOME ACTIVE ACTIVITIES AND ADLS

## 2024-12-02 ASSESSMENT — PAIN DESCRIPTION - DESCRIPTORS
DESCRIPTORS: ACHING
DESCRIPTORS: ACHING;DISCOMFORT

## 2024-12-02 ASSESSMENT — PAIN DESCRIPTION - ORIENTATION: ORIENTATION: OTHER (COMMENT)

## 2024-12-02 NOTE — PLAN OF CARE
CHF Care Plan      Patient's EF (Ejection Fraction) is greater than 40%    Heart Failure Medications:  Diuretics:: Torsemide    (One of the following REQUIRED for EF </= 40%/SYSTOLIC FAILURE but MAY be used in EF% >40%/DIASTOLIC FAILURE)        ACE:: None        ARB:: None         ARNI:: None    (Beta Blockers)  NON- Evidenced Based Beta Blocker (for EF% >40%/DIASTOLIC FAILURE): Metoprolol TARTrate- Lopressor    Evidenced Based Beta Blocker::(REQUIRED for EF% <40%/SYSTOLIC FAILURE) None  ...................................................................................................................................................    Failed to redirect to the Timeline version of the [employer] SmartLink.      Patient's weights and intake/output reviewed    Daily Weight log at bedside, patient/family participation in use of log: \"yes, patient able but unwilling to participate due to anxiety    Patient's current weight today shows a difference of 8 lbs more than last documented weight.      Intake/Output Summary (Last 24 hours) at 12/2/2024 1547  Last data filed at 12/2/2024 1547  Gross per 24 hour   Intake 1320 ml   Output 250 ml   Net 1070 ml       Education Booklet Provided: yes    Comorbidities Reviewed Yes    Patient has a past medical history of NADJA (acute kidney injury) ([facility]), Asthma, Atrial fibrillation with RVR ([facility]), CAD (coronary artery disease), CHF (congestive heart failure) ([facility]), Chronic anxiety, COPD (chronic obstructive pulmonary disease) (HCC), COPD (chronic obstructive pulmonary disease) (HCC), GERD (gastroesophageal reflux disease), Heart attack (HCC), History of blood transfusion, Hyperlipidemia, Hypertension, MRSA (methicillin resistant staph aureus) culture positive, OA (osteoarthritis), and Thyroid disease.     >>For CHF and Comorbidity documentation on Education Time and Topics, please see Education Tab      CHF Education    Learners:  Patient  Readineess:   Acceptance, but resistant to  teaching  Method:   Explanation  Response:    Verbalizes Understanding and Needs Reinforcement    Comments: patient resistant to teaching      Time Spent: 10        Pt resting in bed at this time on  3 L O2. Pt denies shortness of breath. Pt without lower extremity edema.     Patient and/or Family's stated Goal of Care this Admission: increase activity tolerance, better understand heart failure and disease management, be more comfortable, and reduce lower extremity edema prior to discharge        :

## 2024-12-02 NOTE — PROGRESS NOTES
Comprehensive Nutrition Assessment    Type and Reason for Visit:  Initial (Low BMI of 17.82 kg/m2)    Nutrition Recommendations/Plan:   Continue Easy to Chew and No Caffeine diet.   Add Ensure Compact TID (prefers vanilla).   Encourage PO intake as tolerated.   Monitor nutrition adequacy, pertinent labs, bowel habits, wt changes, and clinical progress      Malnutrition Assessment:  Malnutrition Status:  At risk for malnutrition (12/02/24 1228)    Context:  Acute Illness     Findings of the 6 clinical characteristics of malnutrition:  Energy Intake:  Mild decrease in energy intake  Weight Loss:  No weight loss     Body Fat Loss:  Mild body fat loss (question natural aging versus malnutrition) Orbital   Muscle Mass Loss:  Unable to assess (question natural aging versus malnutrition)    Fluid Accumulation:  Unable to assess (none documented this admit)     Strength:  Not Performed    Nutrition Assessment:    Pt visited for initial + low BMI (17.82 kg/m2). Admitted w/ dizziness, weakness, and difficulty ambulating. PMH of CAD, CHF, CKD, NADJA, GERD, HTN, and HLD. Pt reports that her appetite is good currently and PTA. Per EMR, pt ate 1-25% of dinner last night. Aside from outlier wt on 12/01 (question its accuracy), wts appear relatively stable the past couple weeks per EMR. Denies unintentional wt loss or gain. Pt unsure of UBW but it appears to be 100-110 lbs in 2024 per EMR.  Agreeable to ONS TID. Encouraged PO intake from meals before drinking ONS - pt voiced understanding. Reports nausea this morning and emesis yesterday but denies diarrhea and constipation. Will continue to monitor.    Nutrition Related Findings:    No BM documented this admit. BUN: 27 mg/dL and creatinine: 1.5 mg/dL. Magnesium: 2.54 mg/dL. No edema documented. Wound Type: None       Current Nutrition Intake & Therapies:    Average Meal Intake: 1-25%  Average Supplements Intake: None Ordered  ADULT DIET; Easy to Chew; No Caffeine  ADULT ORAL  NUTRITION SUPPLEMENT; Breakfast, Lunch, Dinner; Standard 4 oz Oral Supplement    Anthropometric Measures:  Height: 162.6 cm (5' 4.02\")  Ideal Body Weight (IBW): 120 lbs (55 kg)    Current Body Weight: 47.1 kg (103 lb 13.4 oz), 86.5 % IBW. Weight Source: Standing scale  Current BMI (kg/m2): 17.8  Weight Adjustment For: No Adjustment  BMI Categories: Underweight (BMI less than 22) age over 65    Estimated Daily Nutrient Needs:  Energy Requirements Based On: Kcal/kg  Weight Used for Energy Requirements: Current  Energy (kcal/day): 1,413-1,649 (30-35 kcal/kg CBW)  Weight Used for Protein Requirements: Ideal  Protein (g/day): 50-55 (0.9-1 g/kg IBW)  Fluid (ml/day): < 2,000 mL per CHF recommendations    Nutrition Diagnosis:   Inadequate oral intake related to early satiety as evidenced by intake 0-25%    Nutrition Interventions:   Food and/or Nutrient Delivery: Continue Current Diet, Start Oral Nutrition Supplement  Nutrition Education/Counseling: No recommendation at this time  Coordination of Nutrition Care: Continue to monitor while inpatient     Goals:  Goals: PO intake 50% or greater, within 7 days  Type of Goal: New goal     Nutrition Monitoring and Evaluation:   Behavioral-Environmental Outcomes: None Identified  Food/Nutrient Intake Outcomes: Food and Nutrient Intake, Supplement Intake  Physical Signs/Symptoms Outcomes: Biochemical Data, Nausea or Vomiting, Weight, Meal Time Behavior    Discharge Planning:    Continue current diet     Jo Ramirez RD  Contact: [phone]

## 2024-12-02 NOTE — PLAN OF CARE
Problem: Chronic Conditions and Co-morbidities  Goal: Patient's chronic conditions and co-morbidity symptoms are monitored and maintained or improved  Outcome: Progressing     Problem: Discharge Planning  Goal: Discharge to home or other facility with appropriate resources  Outcome: Progressing     Problem: Pain  Goal: Verbalizes/displays adequate comfort level or baseline comfort level  Outcome: Progressing     Problem: Safety - Adult  Goal: Free from fall injury  Outcome: Progressing     Problem: Skin/Tissue Integrity  Goal: Absence of new skin breakdown  Description: 1.  Monitor for areas of redness and/or skin breakdown  2.  Assess vascular access sites hourly  3.  Every 4-6 hours minimum:  Change oxygen saturation probe site  4.  Every 4-6 hours:  If on nasal continuous positive airway pressure, respiratory therapy assess nares and determine need for appliance change or resting period.  Outcome: Progressing     Problem: Cardiovascular - Adult  Goal: Maintains optimal cardiac output and hemodynamic stability  Outcome: Progressing     Problem: Musculoskeletal - Adult  Goal: Return mobility to safest level of function  Outcome: Progressing     Problem: Metabolic/Fluid and Electrolytes - Adult  Goal: Electrolytes maintained within normal limits  Outcome: Progressing     Problem: Hematologic - Adult  Goal: Maintains hematologic stability  Outcome: Progressing     Problem: Skin/Tissue Integrity - Adult  Goal: Skin integrity remains intact  Outcome: Progressing

## 2024-12-02 NOTE — ACP (ADVANCE CARE PLANNING)
Advance Care Planning   General Advance Care Planning (ACP) Conversation    Date of Conversation: 12/1/2024  Conducted with: Patient with Decision Making Capacity  Other persons present: None    Healthcare Decision Maker:    Primary Decision Maker: Juan Smith - Child - [phone]    Secondary Decision Maker: Hafsa Tapia - Grandchild - [phone]    Secondary Decision Maker: Raphael Smith - Child - [phone]    Today we documented Decision Maker(s) consistent with Legal Next of Kin hierarchy.  Content/Action Overview:  Has NO ACP documents-Information provided  Reviewed DNR/DNI and patient elects Full Code (Attempt Resuscitation)      Length of Voluntary ACP Conversation in minutes:  <16 minutes (Non-Billable)    Ashly Wilson RN

## 2024-12-02 NOTE — CARE COORDINATION
Case Management Assessment  Initial Evaluation    Date/Time of Evaluation: 12/2/2024 11:41 AM  Assessment Completed by: Ashly Wilson RN    If patient is discharged prior to next notation, then this note serves as note for discharge by case management.    Patient Name: Terri Smith                   YOB: 1940  Diagnosis: Hypomagnesemia [E83.42]  Dizziness [R42]  Vertigo [R42]  Ambulatory dysfunction [R26.2]  Chronic obstructive pulmonary disease, unspecified COPD type (HCC) [J44.9]  Chronic kidney disease, unspecified CKD stage [N18.9]                   Date / Time: 12/1/2024  8:15 AM    Patient Admission Status: Inpatient   Readmission Risk (Low < 19, Mod (19-27), High > 27): Readmission Risk Score: 34.8    Current PCP: Jackson Ontiveros MD  PCP verified by CM? Yes    Chart Reviewed: Yes      History Provided by: Patient  Patient Orientation: Alert and Oriented    Patient Cognition: Alert    Hospitalization in the last 30 days (Readmission):  Yes    If yes, Readmission Assessment in  Navigator will be completed.    Advance Directives:      Code Status: Full Code   Patient's Primary Decision Maker is: Legal Next of Kin    Primary Decision Maker: Juan Smith - Child - [phone]    Secondary Decision Maker: Hafsa Tapia - Grandchild - [phone]    Secondary Decision Maker: LuisRaphael - Child - [phone]    Discharge Planning:    Patient lives with: Children Type of Home: House  Primary Care Giver: Self  Patient Support Systems include: Children, Family Members   Current Financial resources: Medicare  Current community resources: ECF/Home Care  Current services prior to admission: Oxygen Therapy, Home Care            Current DME: Other (Comment)            Type of Home Care services:  Aide Services, PT, OT, Nursing Services    ADLS  Prior functional level: Assistance with the following:, Bathing, Dressing, Toileting, Cooking, Housework, Shopping, Mobility  Current  functional level: Assistance with the following:, Bathing, Dressing, Toileting, Cooking, Housework, Shopping, Mobility    PT AM-PAC:   /24  OT AM-PAC:   /24    Family can provide assistance at DC: Yes  Would you like Case Management to discuss the discharge plan with any other family members/significant others, and if so, who? No  Plans to Return to Present Housing: Yes  Other Identified Issues/Barriers to RETURNING to current housing:   Potential Assistance needed at discharge: Home Care            Potential DME:    Patient expects to discharge to: House  Plan for transportation at discharge:      Financial    Payor: MEDICARE / Plan: MEDICARE PART A AND B / Product Type: *No Product type* /     Does insurance require precert for SNF: No    Potential assistance Purchasing Medications: No  Meds-to-Beds request:        [id number] [place] [phone] - F [phone]  [address] [place] [id number]  Phone: [phone] Fax: [phone]    [facility] [id number] [address] [phone] - F [phone]  [address] [place] [address]  Phone: [phone] Fax: [phone]    [address] [phone] - F [phone]  [address] [place] [address]  Phone: [phone] Fax: [phone]      Notes:    Factors facilitating achievement of predicted outcomes: Family support, Cooperative, Pleasant, and Good insight into deficits    Barriers to discharge: Decreased endurance and Long standing deficits    Additional Case Management Notes: Pt is from home with her son and DIL. She states her daughter helps on the weekends. She reports that she is never alone. She needs help with all ADLs. She states she does not use a cane or walker to get around. She is active with [facility] and [employer] for home O2. Will continue to follow course for needs. Ashly Wilson RN     The Plan for Transition of Care is related to the

## 2024-12-02 NOTE — PROGRESS NOTES
[facility] Medicine Progress Note  V 10.25      Date of Admission: 12/1/2024    Hospital Day: 2      Chief Admission Complaint:  Weakness, dizziness    Subjective:  Patient seen at bedside this morning. Patient mentions experiencing clear colored vomiting 3x episodes overnight. Patient denies any pain anywhere, dizziness at this time, fevers, chills, diarrhea or constipation.    Presenting Admission History:       84 y.o. female  with PMHx significant for  COPD/on home oxygen 2 liters, HFpEF, CKD, paroxysmal A-fib/on long-term anticoagulation with Eliquis, hypertension, hyperlipidemia, anxiety/depression  has been on long term BZO.  She presented to [facility] ED with complaint of increased weakness, dizziness and difficulty walking.     Of pertinence is she had a recent admission to [facility] from 11/21/2024 to 11/25/2024.   She presented that admission with chest congestion, cough and shortness of breath.  She states she was doing well until the past 2 days when she developed increased dizziness, difficulty with walking.  Her daughter who is at the bedside also states she has not been eating or drinking well for the past 2 days.  No complaint of chest pain, no shortness of breath, no fever or chills.  She is now being admitted for further evaluation and management.    Assessment/Plan:      Current Principal Problem:  Vertigo    Vertigo  Plan:  Encourage hydration PO  IVF as needed  Antiemetics PRN in place  To consider Meclizine as needed  Continue to monitor  Orthostatic vital signs    COPD  She is normally at home oxygen 2 L continuous.  Will continue.  Continue bronchodilators.  At this time she is not having any acute exacerbation and is stable     HFpEF   History is noted.  Her last echocardiogram was in 6/2024.  It did reveal LV systolic function with estimated EF 60 to 65%.  Right ventricle with reduced systolic function, right atrium is mildly dilated.  Will follow volume status, check daily

## 2024-12-02 NOTE — PLAN OF CARE
CHF Care Plan      Patient's EF (Ejection Fraction) is greater than 40%    Heart Failure Medications:  Diuretics:: Torsemide    (One of the following REQUIRED for EF </= 40%/SYSTOLIC FAILURE but MAY be used in EF% >40%/DIASTOLIC FAILURE)        ACE:: None        ARB:: None         ARNI:: None    (Beta Blockers)  NON- Evidenced Based Beta Blocker (for EF% >40%/DIASTOLIC FAILURE): Metoprolol TARTrate- Lopressor    Evidenced Based Beta Blocker::(REQUIRED for EF% <40%/SYSTOLIC FAILURE) None  ...................................................................................................................................................    Failed to redirect to the Timeline version of the [employer] Smart[employer].      Patient's weights and intake/output reviewed    Daily Weight log at bedside, patient/family participation in use of log: pt unable to participate\" (pt anxious)    Patient's current weight today shows a difference of 8 lbs more than last documented weight.      Intake/Output Summary (Last 24 hours) at 12/2/2024 0619  Last data filed at 12/2/2024 0609  Gross per 24 hour   Intake 840 ml   Output 0 ml   Net 840 ml       Education Booklet Provided: yes    Comorbidities Reviewed Yes    Patient has a past medical history of NADJA (acute kidney injury) ([facility]), Asthma, Atrial fibrillation with RVR (HCC), CAD (coronary artery disease), CHF (congestive heart failure) ([facility]), Chronic anxiety, COPD (chronic obstructive pulmonary disease) (HCC), COPD (chronic obstructive pulmonary disease) (HCC), GERD (gastroesophageal reflux disease), Heart attack (HCC), History of blood transfusion, Hyperlipidemia, Hypertension, MRSA (methicillin resistant staph aureus) culture positive, OA (osteoarthritis), and Thyroid disease.     >>For CHF and Comorbidity documentation on Education Time and Topics, please see Education Tab      CHF Education    Learners:  Patient  Readineess:   Eager, Acceptance, Nonacceptance, and Refuses  Method:

## 2024-12-02 NOTE — PROGRESS NOTES
Occupational Therapy  Facility/Department: [place] [id number] - MED SURG  Occupational Therapy Initial Assessment and Treatment    Name: Terri Smith  : 1940  MRN: [id number]  Date of Service: 2024    Discharge Recommendations:  Subacute/Skilled Nursing Facility (if pt refuses SNF, will need 24 assist and HHOT)  OT Equipment Recommendations  Equipment Needed: No  Other: defer     Therapy discharge recommendations are subject to collaboration from the patient’s interdisciplinary healthcare team, including MD and case management recommendations.    Barriers to Home Discharge:   [x] Steps to access home entry or bed/bath:   [x] Unable to transfer, ambulate, or propel wheelchair household distances without assist   [] Limited available assist at home upon discharge    [] Patient or family requests d/c to post-acute facility    [] Poor cognition/safety awareness for d/c to home alone    [] Unable to maintain ordered weight bearing status    [] Patient with salient signs of long-standing immobility   [x] Decreased independence with ADLs   [x] Increased risk for falls   [] Other:    If pt is unable to be seen after this session, please let this note serve as discharge summary.  Please see case management note for discharge disposition.  Thank you.    Patient Diagnosis(es): The primary encounter diagnosis was Dizziness. Diagnoses of Ambulatory dysfunction, Chronic obstructive pulmonary disease, unspecified COPD type ([facility]), Hypomagnesemia, and Chronic kidney disease, unspecified CKD stage were also pertinent to this visit.  Past Medical History:  has a past medical history of NADJA (acute kidney injury) ([facility]), Asthma, Atrial fibrillation with RVR ([facility]), CAD (coronary artery disease), CHF (congestive heart failure) ([facility]), Chronic anxiety, COPD (chronic obstructive pulmonary disease) ([facility]), COPD (chronic obstructive pulmonary disease) ([facility]), GERD (gastroesophageal reflux disease), Heart attack ([facility]), History of blood  education needed                 AM-PAC - ADL  AM-PAC Daily Activity - Inpatient   How much help is needed for putting on and taking off regular lower body clothing?: A Little  How much help is needed for bathing (which includes washing, rinsing, drying)?: A Lot  How much help is needed for toileting (which includes using toilet, bedpan, or urinal)?: Total  How much help is needed for putting on and taking off regular upper body clothing?: A Little  How much help is needed for taking care of personal grooming?: A Little  How much help for eating meals?: None  AM-[facility] Inpatient Daily Activity Raw Score: 16  AM-PAC Inpatient ADL T-Scale Score : 35.96  ADL Inpatient CMS 0-100% Score: 53.32  ADL Inpatient CMS G-Code Modifier : CK    Goals  Short Term Goals  Time Frame for Short Term Goals: 1 week by 12/09/24 unless otherwise stated  Short Term Goal 1: Perform functional transfer with supervision  Short Term Goal 2: Perform toilet transfer with supervision  Short Term Goal 3: Perform LB dressing (pants/ brief) with supervision  Short Term Goal 4: Perform standing grooming task with supervision  Short Term Goal 5: Perform x15 reps x2 sets BUE exercises in prep for ADLs and transfers by 12/08/24  Patient Goals   Patient goals : to go home      Therapy Time   Individual Concurrent Group Co-treatment   Time In       1123   Time Out       1200   Minutes       37   Timed Code Treatment Minutes: 25 Minutes (12 min eval)       Ebonie Abdi OT

## 2024-12-02 NOTE — PROGRESS NOTES
Physical Therapy  Facility/Department: [place] [id number] - MED SURG  Physical Therapy Initial Assessment/Treatment    Name: Terri Smith  : 1940  MRN: [id number]  Date of Service: 2024    Discharge Recommendations:  Subacute/Skilled Nursing Facility   PT Equipment Recommendations  Equipment Needed: No    Therapy discharge recommendations take into account each patient's current medical complexities and are subject to input/oversight from the patient's healthcare team.   Barriers to Home Discharge:   [x] Steps to access home entry or bed/bath:   [x] Unable to transfer, ambulate, or propel wheelchair household distances without assist   [] Limited available assist at home upon discharge    [] Patient or family requests d/c to post-acute facility    [x] Poor cognition/safety awareness for d/c to home alone    []Unable to maintain ordered weight bearing status    [] Patient with salient signs of long-standing immobility   [x] Patient is at risk for falls due to: decreased balance, pt closes eyes when standning   [] Other: Pt refuses to ambulate at this time making it unsafe to d/c home    If pt is unable to be seen after this session, please let this note serve as discharge summary.  Please see case management note for discharge disposition.  Thank you.        Patient Diagnosis(es): The primary encounter diagnosis was Dizziness. Diagnoses of Ambulatory dysfunction, Chronic obstructive pulmonary disease, unspecified COPD type ([facility]), Hypomagnesemia, and Chronic kidney disease, unspecified CKD stage were also pertinent to this visit.  Past Medical History:  has a past medical history of NADJA (acute kidney injury) ([facility]), Asthma, Atrial fibrillation with RVR ([facility]), CAD (coronary artery disease), CHF (congestive heart failure) ([facility]), Chronic anxiety, COPD (chronic obstructive pulmonary disease) ([facility]), COPD (chronic obstructive pulmonary disease) ([facility]), GERD (gastroesophageal reflux disease), Heart attack ([facility]),

## 2024-12-02 NOTE — PLAN OF CARE
Problem: Chronic Conditions and Co-morbidities  Goal: Patient's chronic conditions and co-morbidity symptoms are monitored and maintained or improved  Outcome: Progressing     Problem: Discharge Planning  Goal: Discharge to home or other facility with appropriate resources  Outcome: Progressing  Flowsheets (Taken 12/1/2024 1517 by Heather Gama, RN)  Discharge to home or other facility with appropriate resources: Identify barriers to discharge with patient and caregiver     Problem: Pain  Goal: Verbalizes/displays adequate comfort level or baseline comfort level  Outcome: Progressing     Problem: Safety - Adult  Goal: Free from fall injury  Outcome: Progressing     Problem: Skin/Tissue Integrity  Goal: Absence of new skin breakdown  Description: 1.  Monitor for areas of redness and/or skin breakdown  2.  Assess vascular access sites hourly  3.  Every 4-6 hours minimum:  Change oxygen saturation probe site  4.  Every 4-6 hours:  If on nasal continuous positive airway pressure, respiratory therapy assess nares and determine need for appliance change or resting period.  Outcome: Progressing

## 2024-12-03 LAB
ANION GAP SERPL CALCULATED.3IONS-SCNC: 11 MMOL/L (ref 3–16)
BASOPHILS # BLD: 0 K/UL (ref 0–0.2)
BASOPHILS NFR BLD: 0.4 %
BUN SERPL-MCNC: 28 MG/DL (ref 7–20)
CALCIUM SERPL-MCNC: 9.3 MG/DL (ref 8.3–10.6)
CHLORIDE SERPL-SCNC: 97 MMOL/L (ref 99–110)
CHLORIDE UR-SCNC: <20 MMOL/L
CO2 SERPL-SCNC: 32 MMOL/L (ref 21–32)
CREAT SERPL-MCNC: 2 MG/DL (ref 0.6–1.2)
CREAT UR-MCNC: 69.3 MG/DL (ref 28–259)
DEPRECATED RDW RBC AUTO: 14.4 % (ref 12.4–15.4)
EOSINOPHIL # BLD: 0.2 K/UL (ref 0–0.6)
EOSINOPHIL NFR BLD: 1.6 %
GFR SERPLBLD CREATININE-BSD FMLA CKD-EPI: 24 ML/MIN/{1.73_M2}
GLUCOSE SERPL-MCNC: 100 MG/DL (ref 70–99)
HCT VFR BLD AUTO: 32.8 % (ref 36–48)
HGB BLD-MCNC: 10.4 G/DL (ref 12–16)
LYMPHOCYTES # BLD: 1.5 K/UL (ref 1–5.1)
LYMPHOCYTES NFR BLD: 14.5 %
MAGNESIUM SERPL-MCNC: 2.35 MG/DL (ref 1.8–2.4)
MCH RBC QN AUTO: 30.7 PG (ref 26–34)
MCHC RBC AUTO-ENTMCNC: 31.6 G/DL (ref 31–36)
MCV RBC AUTO: 97.2 FL (ref 80–100)
MONOCYTES # BLD: 0.8 K/UL (ref 0–1.3)
MONOCYTES NFR BLD: 7.6 %
NEUTROPHILS # BLD: 7.7 K/UL (ref 1.7–7.7)
NEUTROPHILS NFR BLD: 75.9 %
PHOSPHATE SERPL-MCNC: 4 MG/DL (ref 2.5–4.9)
PLATELET # BLD AUTO: 236 K/UL (ref 135–450)
PMV BLD AUTO: 8.1 FL (ref 5–10.5)
POTASSIUM SERPL-SCNC: 4.1 MMOL/L (ref 3.5–5.1)
POTASSIUM UR-SCNC: 38.7 MMOL/L
RBC # BLD AUTO: 3.38 M/UL (ref 4–5.2)
SODIUM SERPL-SCNC: 140 MMOL/L (ref 136–145)
SODIUM UR-SCNC: <20 MMOL/L
WBC # BLD AUTO: 10.1 K/UL (ref 4–11)

## 2024-12-03 PROCEDURE — 84133 ASSAY OF URINE POTASSIUM: CPT

## 2024-12-03 PROCEDURE — 2580000003 HC RX 258: Performed by: INTERNAL MEDICINE

## 2024-12-03 PROCEDURE — 82436 ASSAY OF URINE CHLORIDE: CPT

## 2024-12-03 PROCEDURE — 84300 ASSAY OF URINE SODIUM: CPT

## 2024-12-03 PROCEDURE — 2580000003 HC RX 258

## 2024-12-03 PROCEDURE — 84540 ASSAY OF URINE/UREA-N: CPT

## 2024-12-03 PROCEDURE — 6360000002 HC RX W HCPCS: Performed by: INTERNAL MEDICINE

## 2024-12-03 PROCEDURE — 6370000000 HC RX 637 (ALT 250 FOR IP): Performed by: INTERNAL MEDICINE

## 2024-12-03 PROCEDURE — 84100 ASSAY OF PHOSPHORUS: CPT

## 2024-12-03 PROCEDURE — 80048 BASIC METABOLIC PNL TOTAL CA: CPT

## 2024-12-03 PROCEDURE — 82570 ASSAY OF URINE CREATININE: CPT

## 2024-12-03 PROCEDURE — 1200000000 HC SEMI PRIVATE

## 2024-12-03 PROCEDURE — 92610 EVALUATE SWALLOWING FUNCTION: CPT

## 2024-12-03 PROCEDURE — 36415 COLL VENOUS BLD VENIPUNCTURE: CPT

## 2024-12-03 PROCEDURE — 94640 AIRWAY INHALATION TREATMENT: CPT

## 2024-12-03 PROCEDURE — 83735 ASSAY OF MAGNESIUM: CPT

## 2024-12-03 PROCEDURE — 92526 ORAL FUNCTION THERAPY: CPT

## 2024-12-03 PROCEDURE — 85025 COMPLETE CBC W/AUTO DIFF WBC: CPT

## 2024-12-03 PROCEDURE — 94761 N-INVAS EAR/PLS OXIMETRY MLT: CPT

## 2024-12-03 PROCEDURE — 2700000000 HC OXYGEN THERAPY PER DAY

## 2024-12-03 RX ORDER — SODIUM CHLORIDE, SODIUM LACTATE, POTASSIUM CHLORIDE, CALCIUM CHLORIDE 600; 310; 30; 20 MG/100ML; MG/100ML; MG/100ML; MG/100ML
INJECTION, SOLUTION INTRAVENOUS CONTINUOUS
Status: DISCONTINUED | OUTPATIENT
Start: 2024-12-03 | End: 2024-12-04

## 2024-12-03 RX ADMIN — LORAZEPAM 0.5 MG: 0.5 TABLET ORAL at 10:31

## 2024-12-03 RX ADMIN — CIPROFLOXACIN AND DEXAMETHASONE 4 DROP: 3; 1 SUSPENSION/ DROPS AURICULAR (OTIC) at 20:40

## 2024-12-03 RX ADMIN — LORAZEPAM 0.5 MG: 0.5 TABLET ORAL at 22:38

## 2024-12-03 RX ADMIN — LEVOTHYROXINE SODIUM 25 MCG: 0.03 TABLET ORAL at 05:02

## 2024-12-03 RX ADMIN — ESCITALOPRAM OXALATE 10 MG: 10 TABLET ORAL at 10:31

## 2024-12-03 RX ADMIN — ATORVASTATIN CALCIUM 40 MG: 40 TABLET, FILM COATED ORAL at 20:40

## 2024-12-03 RX ADMIN — GUAIFENESIN 600 MG: 600 TABLET ORAL at 10:31

## 2024-12-03 RX ADMIN — BUDESONIDE 500 MCG: 0.5 SUSPENSION RESPIRATORY (INHALATION) at 07:45

## 2024-12-03 RX ADMIN — APIXABAN 2.5 MG: 2.5 TABLET, FILM COATED ORAL at 20:40

## 2024-12-03 RX ADMIN — METOPROLOL TARTRATE 25 MG: 25 TABLET, FILM COATED ORAL at 20:40

## 2024-12-03 RX ADMIN — ALBUTEROL SULFATE 2.5 MG: 2.5 SOLUTION RESPIRATORY (INHALATION) at 19:12

## 2024-12-03 RX ADMIN — BUSPIRONE HYDROCHLORIDE 10 MG: 5 TABLET ORAL at 14:16

## 2024-12-03 RX ADMIN — CIPROFLOXACIN AND DEXAMETHASONE 4 DROP: 3; 1 SUSPENSION/ DROPS AURICULAR (OTIC) at 10:26

## 2024-12-03 RX ADMIN — LORAZEPAM 0.5 MG: 0.5 TABLET ORAL at 00:21

## 2024-12-03 RX ADMIN — ALBUTEROL SULFATE 2.5 MG: 2.5 SOLUTION RESPIRATORY (INHALATION) at 07:45

## 2024-12-03 RX ADMIN — FERROUS SULFATE TAB 325 MG (65 MG ELEMENTAL FE) 325 MG: 325 (65 FE) TAB at 10:31

## 2024-12-03 RX ADMIN — BUDESONIDE 500 MCG: 0.5 SUSPENSION RESPIRATORY (INHALATION) at 19:17

## 2024-12-03 RX ADMIN — PANTOPRAZOLE SODIUM 40 MG: 40 TABLET, DELAYED RELEASE ORAL at 10:31

## 2024-12-03 RX ADMIN — ACETAMINOPHEN 650 MG: 325 TABLET ORAL at 22:37

## 2024-12-03 RX ADMIN — SODIUM CHLORIDE, POTASSIUM CHLORIDE, SODIUM LACTATE AND CALCIUM CHLORIDE 1000 ML: 600; 310; 30; 20 INJECTION, SOLUTION INTRAVENOUS at 10:45

## 2024-12-03 RX ADMIN — PANTOPRAZOLE SODIUM 40 MG: 40 TABLET, DELAYED RELEASE ORAL at 20:40

## 2024-12-03 RX ADMIN — METOPROLOL TARTRATE 25 MG: 25 TABLET, FILM COATED ORAL at 10:31

## 2024-12-03 RX ADMIN — GABAPENTIN 100 MG: 100 CAPSULE ORAL at 20:40

## 2024-12-03 RX ADMIN — Medication 1 CAPSULE: at 10:31

## 2024-12-03 RX ADMIN — GABAPENTIN 100 MG: 100 CAPSULE ORAL at 10:31

## 2024-12-03 RX ADMIN — GABAPENTIN 100 MG: 100 CAPSULE ORAL at 14:16

## 2024-12-03 RX ADMIN — ALBUTEROL SULFATE 2.5 MG: 2.5 SOLUTION RESPIRATORY (INHALATION) at 11:23

## 2024-12-03 RX ADMIN — ALBUTEROL SULFATE 2.5 MG: 2.5 SOLUTION RESPIRATORY (INHALATION) at 15:23

## 2024-12-03 RX ADMIN — SODIUM CHLORIDE, PRESERVATIVE FREE 10 ML: 5 INJECTION INTRAVENOUS at 10:32

## 2024-12-03 RX ADMIN — ROFLUMILAST 500 MCG: 500 TABLET ORAL at 10:30

## 2024-12-03 RX ADMIN — APIXABAN 2.5 MG: 2.5 TABLET, FILM COATED ORAL at 10:31

## 2024-12-03 ASSESSMENT — PAIN DESCRIPTION - LOCATION: LOCATION: GENERALIZED

## 2024-12-03 NOTE — PROGRESS NOTES
[facility] Medicine Progress Note  V 10.25      Date of Admission: 12/1/2024    Hospital Day: 3      Chief Admission Complaint:  Weakness, dizziness    Subjective:  Patient seen at bedside this morning. Patient denies any pain anywhere, dizziness at this time, fevers, chills, diarrhea or constipation, nausea or vomiting.    Presenting Admission History:       84 y.o. female  with PMHx significant for  COPD/on home oxygen 2 liters, HFpEF, CKD, paroxysmal A-fib/on long-term anticoagulation with Eliquis, hypertension, hyperlipidemia, anxiety/depression  has been on long term BZO.  She presented to [facility] ED with complaint of increased weakness, dizziness and difficulty walking.     Of pertinence is she had a recent admission to [facility] from 11/21/2024 to 11/25/2024.   She presented that admission with chest congestion, cough and shortness of breath.  She states she was doing well until the past 2 days when she developed increased dizziness, difficulty with walking.  Her daughter who is at the bedside also states she has not been eating or drinking well for the past 2 days.  No complaint of chest pain, no shortness of breath, no fever or chills.  She is now being admitted for further evaluation and management.    Assessment/Plan:      Current Principal Problem:  Vertigo    Vertigo  Plan:  Encourage hydration PO  IVF as needed  Antiemetics PRN in place  To consider Meclizine as needed  Continue to monitor  Orthostatic vital signs    NADJA on CKD  Plan:  Encourage hydration PO  IVF in place as needed  Avoid nephrotoxic medications    COPD  She is normally at home oxygen 2 L continuous.  Will continue.  Continue bronchodilators.  At this time she is not having any acute exacerbation and is stable     HFpEF   History is noted.  Her last echocardiogram was in 6/2024.  It did reveal LV systolic function with estimated EF 60 to 65%.  Right ventricle with reduced systolic function, right atrium is mildly

## 2024-12-03 NOTE — PROGRESS NOTES
4 Eyes Admission Assessment     I agree as the admission nurse that 2 RN's have performed a thorough Head to Toe Skin Assessment on the patient. ALL assessment sites listed below have been assessed for low beaden      Areas assessed by both nurses:   [x]   Head, Face, and Ears   [x]   Shoulders, Back, and Chest  [x]   Arms, Elbows, and Hands   [x]   Coccyx, Sacrum, and Ischum  [x]   Legs, Feet, and Heels        Does the Patient have Skin Breakdown?  No         Trey Prevention initiated:  Yes  already completed on admission  Wound Care Orders initiated:  NA      [facility] nurse consulted for Pressure Injury (Stage 3,4, Unstageable, DTI, NWPT, and Complex wounds):  NA      Nurse 1 eSignature: Electronically signed by MANUELA العلي RN on 12/3/24 at 3:09 AM EST    **SHARE this note so that the co-signing nurse is able to place an eSignature**    Nurse 2 eSignature: Electronically signed by Sharon Chan RN on 12/3/24 at 6:37 AM EST

## 2024-12-03 NOTE — PLAN OF CARE
CHF Care Plan      Patient's EF (Ejection Fraction) is greater than 40%    Heart Failure Medications:  Diuretics:: Torsemide    (One of the following REQUIRED for EF </= 40%/SYSTOLIC FAILURE but MAY be used in EF% >40%/DIASTOLIC FAILURE)        ACE:: None        ARB:: None         ARNI:: None    (Beta Blockers)  NON- Evidenced Based Beta Blocker (for EF% >40%/DIASTOLIC FAILURE): Metoprolol TARTrate- Lopressor    Evidenced Based Beta Blocker::(REQUIRED for EF% <40%/SYSTOLIC FAILURE) None  ...................................................................................................................................................    Failed to redirect to the Timeline version of the [employer] SmartLink.      Patient's weights and intake/output reviewed    Daily Weight log at bedside, patient/family participation in use of log: \"yes    Patient's current weight today shows a difference of 3 lbs less than last documented weight.      Intake/Output Summary (Last 24 hours) at 12/3/2024 0618  Last data filed at 12/3/2024 0445  Gross per 24 hour   Intake 740 ml   Output 450 ml   Net 290 ml       Education Booklet Provided: yes    Comorbidities Reviewed Yes    Patient has a past medical history of NADJA (acute kidney injury) ([facility]), Asthma, Atrial fibrillation with RVR ([facility]), CAD (coronary artery disease), CHF (congestive heart failure) ([facility]), Chronic anxiety, COPD (chronic obstructive pulmonary disease) (HCC), COPD (chronic obstructive pulmonary disease) (HCC), GERD (gastroesophageal reflux disease), Heart attack (HCC), History of blood transfusion, Hyperlipidemia, Hypertension, MRSA (methicillin resistant staph aureus) culture positive, OA (osteoarthritis), and Thyroid disease.     >>For CHF and Comorbidity documentation on Education Time and Topics, please see Education Tab      CHF Education    Learners:  Patient  Readineess:   Acceptance  Method:   Explanation  Response:    Needs Reinforcement    Comments:     Time Spent: 5

## 2024-12-03 NOTE — PLAN OF CARE
Problem: Chronic Conditions and Co-morbidities  Goal: Patient's chronic conditions and co-morbidity symptoms are monitored and maintained or improved  12/3/2024 0100 by Zain Loja RN  Outcome: Progressing  12/2/2024 1141 by Phoebe Peralta RN  Outcome: Progressing     Problem: Discharge Planning  Goal: Discharge to home or other facility with appropriate resources  12/3/2024 0100 by Zain Loja RN  Outcome: Progressing  12/2/2024 1141 by Phoebe Peralta RN  Outcome: Progressing     Problem: Pain  Goal: Verbalizes/displays adequate comfort level or baseline comfort level  12/3/2024 0100 by Zain Loja RN  Outcome: Progressing  12/2/2024 1141 by Phoebe Peralta RN  Outcome: Progressing     Problem: Safety - Adult  Goal: Free from fall injury  12/3/2024 0100 by Zain Loja RN  Outcome: Progressing  12/2/2024 1141 by Phoebe Peralta RN  Outcome: Progressing     Problem: Skin/Tissue Integrity  Goal: Absence of new skin breakdown  Description: 1.  Monitor for areas of redness and/or skin breakdown  2.  Assess vascular access sites hourly  3.  Every 4-6 hours minimum:  Change oxygen saturation probe site  4.  Every 4-6 hours:  If on nasal continuous positive airway pressure, respiratory therapy assess nares and determine need for appliance change or resting period.  12/3/2024 0100 by Zain Loja RN  Outcome: Progressing  12/2/2024 1141 by Phoebe Peralta RN  Outcome: Progressing     Problem: Coping  Goal: Pt/Family able to verbalize concerns and demonstrate effective coping strategies  Description: INTERVENTIONS:  1. Assist patient/family to identify coping skills, available support systems and cultural and spiritual values  2. Provide emotional support, including active listening and acknowledgement of concerns of patient and caregivers  3. Reduce environmental stimuli, as able  4. Instruct patient/family in relaxation techniques, as appropriate  5. Assess for spiritual pain/suffering and  initiate Spiritual Care, Psychosocial Clinical Specialist consults as needed  Outcome: Progressing     Problem: Anxiety  Goal: Will report anxiety at manageable levels  Description: INTERVENTIONS:  1. Administer medication as ordered  2. Teach and rehearse alternative coping skills  3. Provide emotional support with 1:1 interaction with staff  Outcome: Progressing     Problem: Nutrition Deficit:  Goal: Optimize nutritional status  Outcome: Progressing     Problem: Gastrointestinal - Adult  Goal: Minimal or absence of nausea and vomiting  Outcome: Progressing     Problem: Gastrointestinal - Adult  Goal: Maintains adequate nutritional intake  Outcome: Progressing

## 2024-12-03 NOTE — CONSULTS
Consult Placed     Who: DAYNA GANDHI  Date:12/3/2024  Time:1014     Electronically signed by Nadir Zamora on 12/3/2024 at 10:14 AM

## 2024-12-03 NOTE — PROGRESS NOTES
Speech Language Pathology  Clinical Bedside Swallow Assessment  Facility/Department: [facility] B3 - MED SURG        Recommendations:  Diet recommendation: IDDSI 7 Regular- Easy To Chew; IDDSI 0 Thin Liquids - straws OK (single sips); Meds PO as tolerated  *Recommend PO ONLY when pt alert  Instrumentation: Not clinically indicated at this time. Will continue to monitor  Risk management: upright for all intake, stay upright for at least 30 mins after intake, small bites/sips, assist feed, close supervision, oral care 2-3x/day to reduce adverse affects in the event of aspiration, increase physical mobility as able, alternate bites/sips, slow rate of intake, general aspiration precautions, and hold PO and contact SLP if s/s of aspiration or worsening respiratory status develop.  *Pt noted with extensive GI hx, including esophageal dysmotility and Schatzki's ring. EGD most recently completed 24 with dilation completed. Recommend consideration for GI consult if n/v persists with PO- MD aware.     NAME:Terri Smith  : 1940 (84 y.o.)   MRN: [id number]  ROOM: [facility]-  ADMISSION DATE: 2024  PATIENT DIAGNOSIS(ES): Hypomagnesemia [E83.42]  Dizziness [R42]  Vertigo [R42]  Ambulatory dysfunction [R26.2]  Chronic obstructive pulmonary disease, unspecified COPD type ([facility]) [J44.9]  Chronic kidney disease, unspecified CKD stage [N18.9]  Chief Complaint   Patient presents with    Dizziness     Patient has been feeling dizzy for a couple days. Denies chest pain and SOB.      Patient Active Problem List    Diagnosis Date Noted    Vertigo 2024    Acute on chronic heart failure with normal ejection fraction (HCC) 2024    Chest pain due to CAD (HCC) 2024    Anticoagulated 2024    History of GI bleed 2024    Acute on chronic anemia 2024    Other hemorrhoids 2024    SOB (shortness of breath) 2024    Pulmonary venous congestion 2024    Pulmonary HTN ([facility])                                         Dysphagia Therapeutic Intervention:   []  Bolus control Exercises  []  Oral Motor Exercises  []  Molina Water Protocol  []  Thermal Stimulation  []  Oral Care    []  Vital Stim/NMES  []  Laryngeal Exercises  [x]  Patient/Family Education  []  Pharyngeal Exercises  [x]  Therapeutic PO trials with SLP  [x]  Diet tolerance monitoring  []  Other:     Referrals:   [] ENT    []   [] Pulmonology [x] GI  [] Neurology  [] RD  [] OT/ PT  [] N/A    Goals:  Short Term Goals:  Timeframe for Short Term Goals: (5 days 12/8/24)  Goal 1: The patient will tolerate recommended diet with no clinical s/s of aspiration 5/5  Goal 2: The patient will recall/perform recommended compensatory strategies given min cues    Long Term Goals:   Timeframe for Long Term Goals: (7 days 12/10/24)  Goal 1: The patient will tolerate least restrictive diet with no clinical s/s of aspiration or worsening respiratory/pulmonary status    Treatment:  Skilled instruction completed with patient re: evidenced based practice regarding recommendations and POC, importance of oral care to reduce adverse affects in the event of aspiration, and instruction of recommended compensatory strategies developed based upon clinical exam. Pt able to recall/demonstrate compensatory strategies with min-mod cues.       Pt Education: SLP educated the patient re: Role of SLP, rationale for completion of assessment, anatomical components of swallow structures as they pertain to airway protection, results of assessment, recommendations, and POC  Pt Education Response: verbalized understanding, would benefit from ongoing education, and RN aware    Duration/Frequency of Tx: 2-5x/wk    Individuals Consulted:   [x]  Patient     []  NP         [x]  RN   []  RD                   [x]  MD      []  Family Member                        []  PA    []  Other:      Safety Devices / Report:   [x]  All fall risk precautions in place []  Safety

## 2024-12-03 NOTE — CARE COORDINATION
Chart reviewed day 2. Care provided by nephro and IM. Pt is from home with her son and DIL. She states someone is always with her. She is active with [facility] and [facility] for home O2. Pt is declining SNF and wants to go home with the services she currently has. Diuretic on hold, and IVFs started. Creat rising at 2.0 today. Will continue to follow course for needs. Ashly Wilson RN

## 2024-12-03 NOTE — PLAN OF CARE
CHF Care Plan      Patient's EF (Ejection Fraction) is {[facility] GEN GREATER/LESS THAN:[id number]} 40%    Heart Failure Medications:  Diuretics:: {Blank multiple:19196::\"Furosemide\",\"Torsemide\",\"Spironolactone\",\"Metalozone\",\"Other\",\"None\"}    (One of the following REQUIRED for EF </= 40%/SYSTOLIC FAILURE but MAY be used in EF% >40%/DIASTOLIC FAILURE)        ACE:: {Blank single:19197::\"Lisinopril\",\"Enalapril\",\" Ramipril\",\"Other\",\"None\"}        ARB:: {Blank single:19197::\"Valsartan\",\"Losartan\",\"Other\",\"None\"}         ARNI:: {Blank single:19197::\"Sacubitril/Valsartan-Entresto\",\"None\"}    (Beta Blockers)  NON- Evidenced Based Beta Blocker (for EF% >40%/DIASTOLIC FAILURE): {Blank single:19197::\"Metoprolol TARTrate- Lopressor\",\"Atenolol- Tenormin\",\"Propranolol- Inderal\",\"Esmolol-Brevibloc\",\"Sotalol-Betapace\",\"Labetalol- Trandate\",\"Timolol-Blocadren\",\"Other\",\"None\"}    Evidenced Based Beta Blocker::(REQUIRED for EF% <40%/SYSTOLIC FAILURE) {Blank single:19197::\"Metoprolol SUCCinate- Toprol XL\",\"Carvedilol- Coreg\",\"Bisoprolol-Zebeta\",\"None\"}  ...................................................................................................................................................    Failed to redirect to the Timeline version of the [employer] SmartLink.      Patient's weights and intake/output reviewed    Daily Weight log at bedside, patient/family participation in use of log: {blank single:19197::\"\"yes\", \"no\", \"pt unable to participate\" (***)\"}    Patient's current weight today shows a difference of *** lbs {Blank:19197::\"more\",\"less\"} than last documented weight.      Intake/Output Summary (Last 24 hours) at 12/3/2024 0605  Last data filed at 12/3/2024 0445  Gross per 24 hour   Intake 490 ml   Output 450 ml   Net 40 ml       Education Booklet Provided: {Blank single:19197::\"yes\",\"no\"}    Comorbidities Reviewed {Blank single:19197::\"Yes\",\"No\"}    Patient has a past medical history of NADJA (acute kidney injury) (HCC), Asthma,

## 2024-12-03 NOTE — PLAN OF CARE
CHF Care Plan      Patient's EF (Ejection Fraction) is greater than 40%    Heart Failure Medications:  Diuretics:: Torsemide- on hold     (One of the following REQUIRED for EF </= 40%/SYSTOLIC FAILURE but MAY be used in EF% >40%/DIASTOLIC FAILURE)        ACE:: None        ARB:: None         ARNI:: None    (Beta Blockers)  NON- Evidenced Based Beta Blocker (for EF% >40%/DIASTOLIC FAILURE): Metoprolol TARTrate- Lopressor    Evidenced Based Beta Blocker::(REQUIRED for EF% <40%/SYSTOLIC FAILURE) None  ...................................................................................................................................................    Failed to redirect to the Timeline version of the [employer] SmartLink.      Patient's weights and intake/output reviewed    Daily Weight log at bedside, patient/family participation in use of log: \"yes    Patient's current weight today shows a difference of 3 lbs less than last documented weight.      Intake/Output Summary (Last 24 hours) at 12/3/2024 1550  Last data filed at 12/3/2024 1408  Gross per 24 hour   Intake 630 ml   Output 300 ml   Net 330 ml       Education Booklet Provided: yes    Comorbidities Reviewed Yes    Patient has a past medical history of NADJA (acute kidney injury) ([facility]), Asthma, Atrial fibrillation with RVR ([facility]), CAD (coronary artery disease), CHF (congestive heart failure) ([facility]), Chronic anxiety, COPD (chronic obstructive pulmonary disease) (HCC), COPD (chronic obstructive pulmonary disease) (HCC), GERD (gastroesophageal reflux disease), Heart attack (HCC), History of blood transfusion, Hyperlipidemia, Hypertension, MRSA (methicillin resistant staph aureus) culture positive, OA (osteoarthritis), and Thyroid disease.     >>For CHF and Comorbidity documentation on Education Time and Topics, please see Education Tab      CHF Education    Learners:  Patient  Readineess:   Acceptance  Method:   Explanation  Response:    Needs Reinforcement    Comments: pt has  baseline intermittent forgetfulness     Time Spent: 10 min      Pt resting in bed at this time on  3 L O2. Pt with complaints of shortness of breath. Pt without lower extremity edema.     Patient and/or Family's stated Goal of Care this Admission: reduce shortness of breath, increase activity tolerance, better understand heart failure and disease management, and be more comfortable prior to discharge        :

## 2024-12-03 NOTE — CONSULTS
[facility] Consult Note           Reason for Consult:  Acute kidney injury  Requesting Physician:  Dr. Kumar    Chief Complaint:  Weakness  History Obtained From:  patient, electronic medical record    History of Present Ilness:    84 year old female with COPD on 2 L NC at home, CHF, Atrial fibrillation, HTN, CKD stage 3b who presents with weakness.  We have been asked to assist in further mgmt of her NADJA.    SCr 1.3 on admission, up to 1.5, and now 2.0 today, incontinent of urine, prior 1.4-1.6 range from 2024.  UA bland on admission.  Restarted on torsemide on .  +weak with difficulty with ambulation, no shortness of breath, +intake reduced, no fevers.    Past Medical History:        Diagnosis Date    NADJA (acute kidney injury) ([facility])     Asthma     Atrial fibrillation with RVR (HCC)     CAD (coronary artery disease)     CHF (congestive heart failure) ([facility])     unsure    Chronic anxiety     COPD (chronic obstructive pulmonary disease) ([facility])     COPD (chronic obstructive pulmonary disease) ([facility]) 2023    GERD (gastroesophageal reflux disease) 2021    Heart attack ([facility]) 2019    History of blood transfusion     Hyperlipidemia     Hypertension     MRSA (methicillin resistant staph aureus) culture positive 2019    + resp cx    OA (osteoarthritis) 2014    Thyroid disease        Past Surgical History:        Procedure Laterality Date    BRONCHOSCOPY  2019    Dr. Wheatley - w/BAL    CARDIAC CATHETERIZATION  2019    Dr. Palomares     SECTION      COLONOSCOPY N/A 2020    COLONOSCOPY DIAGNOSTIC performed by Rowdy Schmitz DO at [facility] ENDOSCOPY    COLONOSCOPY N/A 10/22/2021    COLONOSCOPY POLYPECTOMY SNARE/COLD BIOPSY performed by Celestine Lester MD at [facility] ENDOSCOPY    COLONOSCOPY N/A 2023    COLONOSCOPY POLYPECTOMY SNARE performed by Darron Langford MD at [facility] ENDOSCOPY    CORONARY ARTERY  Reported on 2024) 120 tablet 3    docusate sodium (COLACE, DULCOLAX) 100 MG CAPS Take 100 mg by mouth daily 30 capsule 0    sodium chloride (OCEAN, BABY AYR) 0.65 % nasal spray 1 spray by Nasal route as needed for Congestion 1 each 2    lactobacillus (CULTURELLE) capsule Take 1 capsule by mouth daily (with breakfast) (Patient not taking: Reported on 2024)      OXYGEN Inhale 2 L into the lungs 2-3         Allergies:  Latex and Codeine    Social History:    Social History     Socioeconomic History    Marital status:      Spouse name: Not on file    Number of children: 4    Years of education: Not on file    Highest education level: Not on file   Occupational History    Not on file   Tobacco Use    Smoking status: Former     Current packs/day: 0.00     Average packs/day: 1 pack/day for 50.0 years (50.0 ttl pk-yrs)     Types: Cigarettes     Start date: 1969     Quit date: 2019     Years since quittin.2     Passive exposure: Past    Smokeless tobacco: Never    Tobacco comments:     quit 2018   Vaping Use    Vaping status: Never Used   Substance and Sexual Activity    Alcohol use: No    Drug use: No    Sexual activity: Not Currently     Partners: Male   Other Topics Concern    Not on file   Social History Narrative    Not on file     Social Determinants of Health     Financial Resource Strain: Not on file   Food Insecurity: No Food Insecurity (2024)    Hunger Vital Sign     Worried About Running Out of Food in the Last Year: Never true     Ran Out of Food in the Last Year: Never true   Transportation Needs: No Transportation Needs (2024)    PRAPARE - Transportation     Lack of Transportation (Medical): No     Lack of Transportation (Non-Medical): No   Physical Activity: Not on file   Stress: Not on file   Social Connections: Not on file   Intimate Partner Violence: Unknown (2024)    Received from [employer] [facility], [facility]  AM    CREATININE 2.0 12/03/2024 06:03 AM    CALCIUM 9.3 12/03/2024 06:03 AM    GFRAA 58 04/09/2022 05:15 AM    GFRAA >60 05/08/2013 06:09 AM    LABGLOM 24 12/03/2024 06:03 AM    LABGLOM 50 02/27/2024 11:12 AM    LABGLOM 36 09/14/2023 12:00 AM    GLUCOSE 100 12/03/2024 06:03 AM         Assessment/    - Acute kidney injury - suspected recurrent pre-renal state    - Chronic kidney disease stage 3b    - Diastolic CHF    - COPD - on 2 L NC at home    Plan/    - IVF trial with LR  - Holding torsemide  - Urine electrolytes  - Trend labs, bp's, weights, & urine output      Thank you for the consultation.  Please do not hesitate to call with questions.    ____________________________________  Dmitri Naik MD  [facility]  [email or web address]  Office: [phone]

## 2024-12-03 NOTE — PLAN OF CARE
SLP completed evaluation. Please refer to notes in EMR.      Thank you,   Jeri Fallon M.A. CCC-SLP   Speech-Language Pathologist

## 2024-12-04 LAB
ANION GAP SERPL CALCULATED.3IONS-SCNC: 9 MMOL/L (ref 3–16)
BASOPHILS # BLD: 0 K/UL (ref 0–0.2)
BASOPHILS NFR BLD: 0.4 %
BUN SERPL-MCNC: 25 MG/DL (ref 7–20)
CALCIUM SERPL-MCNC: 9.5 MG/DL (ref 8.3–10.6)
CHLORIDE SERPL-SCNC: 99 MMOL/L (ref 99–110)
CO2 SERPL-SCNC: 33 MMOL/L (ref 21–32)
CREAT SERPL-MCNC: 1.4 MG/DL (ref 0.6–1.2)
DEPRECATED RDW RBC AUTO: 13.6 % (ref 12.4–15.4)
EOSINOPHIL # BLD: 0.3 K/UL (ref 0–0.6)
EOSINOPHIL NFR BLD: 3.3 %
GFR SERPLBLD CREATININE-BSD FMLA CKD-EPI: 37 ML/MIN/{1.73_M2}
GLUCOSE SERPL-MCNC: 107 MG/DL (ref 70–99)
HCT VFR BLD AUTO: 28.5 % (ref 36–48)
HGB BLD-MCNC: 9.2 G/DL (ref 12–16)
LYMPHOCYTES # BLD: 1.5 K/UL (ref 1–5.1)
LYMPHOCYTES NFR BLD: 18.6 %
MAGNESIUM SERPL-MCNC: 2.21 MG/DL (ref 1.8–2.4)
MCH RBC QN AUTO: 30.9 PG (ref 26–34)
MCHC RBC AUTO-ENTMCNC: 32.4 G/DL (ref 31–36)
MCV RBC AUTO: 95.3 FL (ref 80–100)
MONOCYTES # BLD: 0.6 K/UL (ref 0–1.3)
MONOCYTES NFR BLD: 7.6 %
NEUTROPHILS # BLD: 5.6 K/UL (ref 1.7–7.7)
NEUTROPHILS NFR BLD: 70.1 %
PHOSPHATE SERPL-MCNC: 2.9 MG/DL (ref 2.5–4.9)
PLATELET # BLD AUTO: 247 K/UL (ref 135–450)
PMV BLD AUTO: 7.8 FL (ref 5–10.5)
POTASSIUM SERPL-SCNC: 4.2 MMOL/L (ref 3.5–5.1)
RBC # BLD AUTO: 2.99 M/UL (ref 4–5.2)
SODIUM SERPL-SCNC: 141 MMOL/L (ref 136–145)
WBC # BLD AUTO: 8 K/UL (ref 4–11)

## 2024-12-04 PROCEDURE — 83735 ASSAY OF MAGNESIUM: CPT

## 2024-12-04 PROCEDURE — 97530 THERAPEUTIC ACTIVITIES: CPT

## 2024-12-04 PROCEDURE — 36415 COLL VENOUS BLD VENIPUNCTURE: CPT

## 2024-12-04 PROCEDURE — 6370000000 HC RX 637 (ALT 250 FOR IP): Performed by: INTERNAL MEDICINE

## 2024-12-04 PROCEDURE — 1200000000 HC SEMI PRIVATE

## 2024-12-04 PROCEDURE — 2580000003 HC RX 258: Performed by: INTERNAL MEDICINE

## 2024-12-04 PROCEDURE — 94761 N-INVAS EAR/PLS OXIMETRY MLT: CPT

## 2024-12-04 PROCEDURE — 84100 ASSAY OF PHOSPHORUS: CPT

## 2024-12-04 PROCEDURE — 97535 SELF CARE MNGMENT TRAINING: CPT

## 2024-12-04 PROCEDURE — 94640 AIRWAY INHALATION TREATMENT: CPT

## 2024-12-04 PROCEDURE — 6360000002 HC RX W HCPCS: Performed by: NURSE PRACTITIONER

## 2024-12-04 PROCEDURE — 80048 BASIC METABOLIC PNL TOTAL CA: CPT

## 2024-12-04 PROCEDURE — 97110 THERAPEUTIC EXERCISES: CPT

## 2024-12-04 PROCEDURE — 85025 COMPLETE CBC W/AUTO DIFF WBC: CPT

## 2024-12-04 PROCEDURE — 6360000002 HC RX W HCPCS: Performed by: INTERNAL MEDICINE

## 2024-12-04 PROCEDURE — 2700000000 HC OXYGEN THERAPY PER DAY

## 2024-12-04 RX ORDER — MECLIZINE HCL 12.5 MG 12.5 MG/1
12.5 TABLET ORAL 3 TIMES DAILY PRN
Status: DISCONTINUED | OUTPATIENT
Start: 2024-12-04 | End: 2024-12-06 | Stop reason: HOSPADM

## 2024-12-04 RX ORDER — PANTOPRAZOLE SODIUM 40 MG/10ML
40 INJECTION, POWDER, LYOPHILIZED, FOR SOLUTION INTRAVENOUS 2 TIMES DAILY
Status: DISCONTINUED | OUTPATIENT
Start: 2024-12-04 | End: 2024-12-06

## 2024-12-04 RX ADMIN — LEVOTHYROXINE SODIUM 25 MCG: 0.03 TABLET ORAL at 06:11

## 2024-12-04 RX ADMIN — ATORVASTATIN CALCIUM 40 MG: 40 TABLET, FILM COATED ORAL at 20:04

## 2024-12-04 RX ADMIN — ACETAMINOPHEN 650 MG: 325 TABLET ORAL at 18:50

## 2024-12-04 RX ADMIN — ESCITALOPRAM OXALATE 10 MG: 10 TABLET ORAL at 09:36

## 2024-12-04 RX ADMIN — BUDESONIDE 500 MCG: 0.5 SUSPENSION RESPIRATORY (INHALATION) at 07:48

## 2024-12-04 RX ADMIN — Medication 1 CAPSULE: at 09:36

## 2024-12-04 RX ADMIN — BUSPIRONE HYDROCHLORIDE 10 MG: 5 TABLET ORAL at 14:43

## 2024-12-04 RX ADMIN — CIPROFLOXACIN AND DEXAMETHASONE 4 DROP: 3; 1 SUSPENSION/ DROPS AURICULAR (OTIC) at 20:05

## 2024-12-04 RX ADMIN — GABAPENTIN 100 MG: 100 CAPSULE ORAL at 09:36

## 2024-12-04 RX ADMIN — FERROUS SULFATE TAB 325 MG (65 MG ELEMENTAL FE) 325 MG: 325 (65 FE) TAB at 09:36

## 2024-12-04 RX ADMIN — ROFLUMILAST 500 MCG: 500 TABLET ORAL at 09:37

## 2024-12-04 RX ADMIN — ALBUTEROL SULFATE 2.5 MG: 2.5 SOLUTION RESPIRATORY (INHALATION) at 15:44

## 2024-12-04 RX ADMIN — BUDESONIDE 500 MCG: 0.5 SUSPENSION RESPIRATORY (INHALATION) at 19:05

## 2024-12-04 RX ADMIN — METOPROLOL TARTRATE 25 MG: 25 TABLET, FILM COATED ORAL at 09:36

## 2024-12-04 RX ADMIN — ALBUTEROL SULFATE 2.5 MG: 2.5 SOLUTION RESPIRATORY (INHALATION) at 11:11

## 2024-12-04 RX ADMIN — SODIUM CHLORIDE, PRESERVATIVE FREE 10 ML: 5 INJECTION INTRAVENOUS at 20:05

## 2024-12-04 RX ADMIN — PANTOPRAZOLE SODIUM 40 MG: 40 TABLET, DELAYED RELEASE ORAL at 09:36

## 2024-12-04 RX ADMIN — BUSPIRONE HYDROCHLORIDE 10 MG: 5 TABLET ORAL at 20:03

## 2024-12-04 RX ADMIN — ALBUTEROL SULFATE 2.5 MG: 2.5 SOLUTION RESPIRATORY (INHALATION) at 07:48

## 2024-12-04 RX ADMIN — GABAPENTIN 100 MG: 100 CAPSULE ORAL at 14:43

## 2024-12-04 RX ADMIN — APIXABAN 2.5 MG: 2.5 TABLET, FILM COATED ORAL at 09:36

## 2024-12-04 RX ADMIN — CIPROFLOXACIN AND DEXAMETHASONE 4 DROP: 3; 1 SUSPENSION/ DROPS AURICULAR (OTIC) at 09:36

## 2024-12-04 RX ADMIN — GUAIFENESIN 600 MG: 600 TABLET ORAL at 09:36

## 2024-12-04 RX ADMIN — PANTOPRAZOLE SODIUM 40 MG: 40 INJECTION, POWDER, FOR SOLUTION INTRAVENOUS at 20:05

## 2024-12-04 RX ADMIN — ALBUTEROL SULFATE 2.5 MG: 2.5 SOLUTION RESPIRATORY (INHALATION) at 19:05

## 2024-12-04 RX ADMIN — GABAPENTIN 100 MG: 100 CAPSULE ORAL at 20:04

## 2024-12-04 RX ADMIN — METOPROLOL TARTRATE 25 MG: 25 TABLET, FILM COATED ORAL at 20:04

## 2024-12-04 RX ADMIN — LORAZEPAM 0.5 MG: 0.5 TABLET ORAL at 09:36

## 2024-12-04 ASSESSMENT — PAIN DESCRIPTION - LOCATION: LOCATION: LEG

## 2024-12-04 ASSESSMENT — PAIN DESCRIPTION - ORIENTATION: ORIENTATION: RIGHT;LEFT

## 2024-12-04 ASSESSMENT — PAIN DESCRIPTION - DESCRIPTORS: DESCRIPTORS: ACHING;DISCOMFORT

## 2024-12-04 ASSESSMENT — PAIN SCALES - GENERAL: PAINLEVEL_OUTOF10: 10

## 2024-12-04 NOTE — PROGRESS NOTES
Occupational Therapy  Facility/Department: [facility] B3 - MED SURG  Daily Treatment Note  NAME: Terri Smith  : 1940  MRN: [id number]    Date of Service: 2024    Discharge Recommendations:  Subacute/Skilled Nursing Facility       Therapy discharge recommendations are subject to collaboration from the patient’s interdisciplinary healthcare team, including MD and case management recommendations.  Barriers to Home Discharge:   [x] Steps to access home entry or bed/bath   [x] Unable to transfer, ambulate, or propel wheelchair household distances without assist   [] Limited available assist at home upon discharge    [] Patient or family requests d/c to post-acute facility    [x] Poor cognition/safety awareness    [] Unable to maintain ordered weight bearing status    [] Patient with salient signs of long-standing immobility   [x] Decreased independence with ADLs   [x] Increased risk for falls   [] Other:     Patient Diagnosis(es): The primary encounter diagnosis was Dizziness. Diagnoses of Ambulatory dysfunction, Chronic obstructive pulmonary disease, unspecified COPD type (HCC), Hypomagnesemia, and Chronic kidney disease, unspecified CKD stage were also pertinent to this visit.     Assessment   Assessment: Pt tolerated session fair, pt requiring frequent and extended rest breaks. Pt requesting increase in oxygen throughout session, educated pt on O2 sats and breathing. Pt requiring min-mod A for transfers to/from chair with 20 sec static stand. Pt with decreased insight into deficits, closing eyes and requiring VCs to return attention to task. Pt tolerated therex with rest breaks. Pt continues to function below her baseline, recommend SNF at d/c.   Activity Tolerance: Patient limited by endurance;Patient limited by fatigue  Discharge Recommendations: Subacute/Skilled Nursing Facility     Plan  Occupational Therapy Plan  Times Per Week:

## 2024-12-04 NOTE — PROGRESS NOTES
[facility] Progress Note           Subjective/   84 y.o. year old female who we are seeing in consultation for NADJA.     HPI:  Renal function better with fluids.  C/o shortness of breath, on 3 L NC.    ROS:  +weak, intake improving.    Objective/   GEN:  Chronically ill, /73   Pulse 93   Temp 97.9 °F (36.6 °C) (Oral)   Resp 19   Ht 1.626 m (5' 4.02\")   Wt 45.5 kg (100 lb 5 oz)   SpO2 97%   BMI 17.21 kg/m²   HEENT: non-icteric, no JVD  CV: S1, S2 without m/r/g; no LE edema  RESP: CTA B without w/r/r; breathing wnl  ABD: +bs, soft, nt, no hsm  SKIN: warm, no rashes    Data/  Recent Labs     12/02/24  0618 12/03/24  0603 12/04/24  0606   WBC 10.9 10.1 8.0   HGB 10.2* 10.4* 9.2*   HCT 32.4* 32.8* 28.5*   MCV 96.2 97.2 95.3    236 247     Recent Labs     12/02/24  0618 12/03/24  0603 12/04/24  0606    140 141   K 4.2 4.1 4.2    97* 99   CO2 31 32 33*   GLUCOSE 120* 100* 107*   PHOS  --  4.0 2.9   MG 2.54* 2.35 2.21   BUN 27* 28* 25*   CREATININE 1.5* 2.0* 1.4*   LABGLOM 34* 24* 37*     UA bland  Urine sodium and chloride less than 20    Assessment/     - Acute kidney injury - suspected recurrent pre-renal state   SCr up to 2.0, better with fluids, back at baseline of 1.4-1.6     - Chronic kidney disease stage 3b     - Diastolic CHF     - COPD - on 2 L NC at home      Plan/     - Trial off fluids today  - Holding torsemide  - Trend labs, bp's, weights, & urine output      ____________________________________  Dmitri Naik MD  [facility]  [email or web address]  Office: [phone]

## 2024-12-04 NOTE — PROGRESS NOTES
4 Eyes Skin Assessment     NAME:  Terri Smith  YOB: 1940  MEDICAL RECORD NUMBER:  [id number]    The patient is being assessed for  Other low shaji    I agree that at least one RN has performed a thorough Head to Toe Skin Assessment on the patient. ALL assessment sites listed below have been assessed.      Areas assessed by both nurses:    Head, Face, Ears, Shoulders, Back, Chest, Arms, Elbows, Hands, Sacrum. Buttock, Coccyx, Ischium, Legs. Feet and Heels, and Under Medical Devices         Does the Patient have a Wound? No noted wound(s) redness and blanchable to buttock       Shaji Prevention initiated by RN: Yes  Wound Care Orders initiated by RN: No    Pressure Injury (Stage 3,4, Unstageable, DTI, NWPT, and Complex wounds) if present, place Wound referral order by RN under : No    New Ostomies, if present place, Ostomy referral order under : No     Nurse 1 eSignature: Electronically signed by MADINA DONALDSON RN on 12/4/24 at 6:20 PM EST    **SHARE this note so that the co-signing nurse can place an eSignature**    Nurse 2 eSignature: Electronically signed by Olivia Valentino RN on 12/4/24 at 6:21 PM EST

## 2024-12-04 NOTE — CONSULTS
Consult Placed Perfect Serve    Who: Yanet Michelle  Date: 12/4/2024  Time: 0815     Electronically signed by Meryl Muniz RN on 12/4/2024 at 8:15 AM

## 2024-12-04 NOTE — PROGRESS NOTES
Patient requested to go on home bipap after taking her treatment.  Assisted with patient placing her on her home unit.  Home unit turned on to preset pressures and O2 bled in with 3LPM.

## 2024-12-04 NOTE — PLAN OF CARE
Problem: Chronic Conditions and Co-morbidities  Goal: Patient's chronic conditions and co-morbidity symptoms are monitored and maintained or improved  Outcome: Progressing  Flowsheets (Taken 12/3/2024 1130 by Emelia Tran, RN)  Care Plan - Patient's Chronic Conditions and Co-Morbidity Symptoms are Monitored and Maintained or Improved: Monitor and assess patient's chronic conditions and comorbid symptoms for stability, deterioration, or improvement     Problem: Discharge Planning  Goal: Discharge to home or other facility with appropriate resources  Outcome: Progressing  Flowsheets (Taken 12/3/2024 1130 by Emelia Tran, RN)  Discharge to home or other facility with appropriate resources: Identify barriers to discharge with patient and caregiver     Problem: Pain  Goal: Verbalizes/displays adequate comfort level or baseline comfort level  Outcome: Progressing     Problem: Safety - Adult  Goal: Free from fall injury  Outcome: Progressing     Problem: Cardiovascular - Adult  Goal: Absence of cardiac dysrhythmias or at baseline  Outcome: Progressing  Flowsheets (Taken 12/3/2024 1130 by Emelia Tran, RN)  Absence of cardiac dysrhythmias or at baseline: Monitor cardiac rate and rhythm     Problem: Respiratory - Adult  Goal: Achieves optimal ventilation and oxygenation  Outcome: Progressing     Problem: Gastrointestinal - Adult  Goal: Minimal or absence of nausea and vomiting  Outcome: Progressing  Flowsheets (Taken 12/3/2024 1130 by Emelia Tran, RN)  Minimal or absence of nausea and vomiting: Administer IV fluids as ordered to ensure adequate hydration

## 2024-12-04 NOTE — CONSULTS
CONSULTATION  Terri Smith is a 84 y.o. female asked to see us in consultation by  Jackson Ontiveros MD & Martina Kumar MD for evaluation of touble swallowing, vomiting.    Ms. Smith is an 84-year-old female with past medical history of HTN, COPD, chronic O2 dependence, vertigo, CAD s/p CABG 2018, CHF, CKD, DM2, A-fib on Eliquis, dysphagia, anxiety, depression and GERD who presented to the ER with chest congestion, cough and shortness of breath.  She had recently been admitted to the hospital from 11/21 to 11/25.  She was doing well following discharge until developing syncope when walking 3 days ago.  She does have a history of vertigo.    Our service has been consulted for vomiting.  The patient is not a great historian and provides minimal information as to what symptoms she was experiencing prior to arrival. She denies dysphagia, nausea, diarrhea and constipation.  She does report mild epigastric pain.  Her son who was at bedside states that when she tries to drink water she immediately regurgitates.  Per nursing she was also spitting up her pills.    She last underwent EGD for nausea, vomiting dysphagia during an admission in September with findings of a Schatzki's ring s/p dilation with 60 Latvian Da Silva type dilator.  She was unable to tolerate a barium esophagram at that time.  Of note she did have a barium esophagram in 2019 which reported a hiatal hernia with moderate Schatzki's ring that did not impede passage of her barium tablet. Mild esophageal dysmotility was noted.     Nursing also raised concern for melena, reporting 3 large episode of black stool today.  The patient is on oral iron.    Her Hgb on admission was stable at 10.2.  It did drop to 9.2 this morning.   NADJA noted.            Medications Prior to Admission: budesonide (PULMICORT) 0.5 MG/2ML nebulizer suspension, Take 2 mLs by  PROCEDURES    MASTECTOMY, PARTIAL Left     SIGMOIDOSCOPY  2020    4 bands    SIGMOIDOSCOPY N/A 2020    FLEX SIG W/ BANDING SIGMOIDOSCOPY DIAGNOSTIC FLEXIBLE performed by Rowdy Schmitz DO at [facility] ENDOSCOPY    SIGMOIDOSCOPY N/A 2023    FLEX SIG. performed by Celestine Lester MD at [facility] ENDOSCOPY    UPPER GASTROINTESTINAL ENDOSCOPY N/A 2023    EGD DIAGNOSTIC ONLY performed by Darron Langford MD at [facility] ENDOSCOPY    UPPER GASTROINTESTINAL ENDOSCOPY N/A 2023    EGD BIOPSY performed by Darron Langford MD at [facility] ENDOSCOPY    UPPER GASTROINTESTINAL ENDOSCOPY  2023    EGD ESOPHAGOGASTRODUODENOSCOPY DILATATION performed by Darron Langford MD at [facility] ENDOSCOPY    UPPER GASTROINTESTINAL ENDOSCOPY N/A 2024    EGD BIOPSY performed by Celestine Lester MD at [facility] ENDOSCOPY    UPPER GASTROINTESTINAL ENDOSCOPY N/A 2024    ESOPHAGOGASTRODUODENOSCOPY performed by Calvin Lopez MD at [facility] ENDOSCOPY    UPPER GASTROINTESTINAL ENDOSCOPY  2024    ESOPHAGOGASTRODUODENOSCOPY DILATATION performed by Calvin Lopez MD at [facility] ENDOSCOPY       Family  History:  Family History   Problem Relation Age of Onset    Cancer Mother     Heart Disease Father     Stroke Father     Heart Disease Sister     Stroke Sister        Social History:  Social History     Tobacco Use    Smoking status: Former     Current packs/day: 0.00     Average packs/day: 1 pack/day for 50.0 years (50.0 ttl pk-yrs)     Types: Cigarettes     Start date: 1969     Quit date: 2019     Years since quittin.2     Passive exposure: Past    Smokeless tobacco: Never    Tobacco comments:     quit 2018   Vaping Use    Vaping status: Never Used   Substance Use Topics    Alcohol use: No    Drug use: No       ROS  Refer to HPI.    All other review of systems negative, except for those noted.      PHYSICAL EXAM:   VITAL SIGNS: /76   Pulse 89   Temp 97.4 °F (36.3 °C)

## 2024-12-04 NOTE — CARE COORDINATION
Chart reviewed day 4. Care provided by nephro and IM. Pt is from home with her son and DIL. Between her family, pt states she is never alone. She has [employer] for home O2 and [facility]. Pt is declining SNF. IVFs stopped today. Creat down to 1.4. Will continue to follow course for needs. Ashly Wilson RN

## 2024-12-04 NOTE — PROGRESS NOTES
Physical Therapy  Facility/Department: [facility] B[facility] - MED SURG  Daily Treatment Note  NAME: Terri Smith  : 1940  MRN: [id number]    Date of Service: 2024    Discharge Recommendations:  Subacute/Skilled Nursing Facility   PT Equipment Recommendations  Equipment Needed: No  Other: defer    Therapy discharge recommendations are subject to collaboration from the patient’s interdisciplinary healthcare team, including MD and case management recommendations.  Barriers to Home Discharge:   [x] Steps to access home entry or bed/bath   [x] Unable to transfer, ambulate, or propel wheelchair household distances without assist   [] Limited available assist at home upon discharge    [] Patient or family requests d/c to post-acute facility    [x] Poor cognition/safety awareness    [] Unable to maintain ordered weight bearing status    [] Patient with salient signs of long-standing immobility   [x] Decreased independence with ADLs   [x] Increased risk for falls   [] Other:    Patient Diagnosis(es): The primary encounter diagnosis was Dizziness. Diagnoses of Ambulatory dysfunction, Chronic obstructive pulmonary disease, unspecified COPD type (HCC), Hypomagnesemia, and Chronic kidney disease, unspecified CKD stage were also pertinent to this visit.    Assessment  Assessment: Pt is awake and agreeable to therapy session. Pt is limited by decreased activity tolerance and anxiety regarding mobility. She requires minimal-moderate assistance with transfer to chair. Following transfer, she reports needing more O2; SpO2 96% on 3L, therapist educated on O2 parameters for mobility and did not increase O2 at this time. Recommend SNF upon discharge to progress towards prior level of function and address remaining strength and mobility deficits.  Activity Tolerance: Patient limited by endurance;Patient limited by fatigue  Equipment Needed: No  Other: defer      Plan  Physical Therapy Plan  General Plan: 3-5 times per week  Current  Transfers: Moderate assistance;Adaptive equipment;Additional time  Bed to Chair: Moderate assistance;Adaptive equipment;Additional time (initiated steps to chair w/ Aimee but quickly progressed to modA w/ attempts to sit prior being centered over chair; anxious with standing activity)  Gait  Gait Training: No (deferred d/t poor standing tolerance)           Safety Devices  Type of Devices: Call light within reach;Gait belt;Nurse notified;Left in chair;Chair alarm in place  Restraints  Restraints Initially in Place: No      Goals  Short Term Goals  Time Frame for Short Term Goals: 7 Days (12/9)  Short Term Goal 1: Pt will complete bed mobility IND  Short Term Goal 2: Pt will demo STS w/ LRAD SBA  Short Term Goal 3: Pt will ambulate 100ft CGA w/ LRAD  Short Term Goal 4: Pt will demo 10-12 reps of BLE exercise by 12/5  Patient Goals   Patient Goals : \"To go home\"    Continue all unmet goals 12/4    Education  Patient Education  Education Given To: Patient  Education Provided: Role of Therapy;Plan of Care;Transfer Training  Education Provided Comments: Disease Specific Education: Pt educated on importance of OOB mobility, prevention of complications of bedrest, and general safety during hospitalization.  Education Method: Verbal  Barriers to Learning: Cognition  Education Outcome: Verbalized understanding;Continued education needed    AM-PAC - Mobility    AM-PAC Basic Mobility - Inpatient   How much help is needed turning from your back to your side while in a flat bed without using bedrails?: A Little  How much help is needed moving from lying on your back to sitting on the side of a flat bed without using bedrails?: A Little  How much help is needed moving to and from a bed to a chair?: A Little  How much help is needed standing up from a chair using your arms?: A Little  How much help is needed walking in hospital room?: A Little  How much help is needed climbing 3-5 steps with a railing?: A Lot  AM-PAC Inpatient

## 2024-12-04 NOTE — PROGRESS NOTES
[facility] Medicine Progress Note  V 10.25      Date of Admission: 12/1/2024    Hospital Day: 4      Chief Admission Complaint:  Weakness, dizziness    Subjective:  Patient seen at bedside this morning. Patient denies any pain anywhere, dizziness at this time, fevers, chills, diarrhea or constipation, nausea or vomiting.    Presenting Admission History:       84 y.o. female  with PMHx significant for  COPD/on home oxygen 2 liters, HFpEF, CKD, paroxysmal A-fib/on long-term anticoagulation with Eliquis, hypertension, hyperlipidemia, anxiety/depression  has been on long term BZO.  She presented to [facility] ED with complaint of increased weakness, dizziness and difficulty walking.     Of pertinence is she had a recent admission to [facility] from 11/21/2024 to 11/25/2024.   She presented that admission with chest congestion, cough and shortness of breath.  She states she was doing well until the past 2 days when she developed increased dizziness, difficulty with walking.  Her daughter who is at the bedside also states she has not been eating or drinking well for the past 2 days.  No complaint of chest pain, no shortness of breath, no fever or chills.  She is now being admitted for further evaluation and management.    Assessment/Plan:      Current Principal Problem:  Vertigo    Vertigo  Plan:  Encourage hydration PO  IVF as needed  Antiemetics PRN in place  Meclizine as needed  Continue to monitor  Orthostatic vital signs    NADJA on CKD, improving  Plan:  Encourage hydration PO  IVF in place as needed  Avoid nephrotoxic medications    COPD  She is normally at home oxygen 2 L continuous  Continue bronchodilators     HFpEF   History is noted.  Her last echocardiogram was in 6/2024.  It did reveal LV systolic function with estimated EF 60 to 65%.  Right ventricle with reduced systolic function, right atrium is mildly dilated.  Will follow volume status, check daily weights  Plan:  Held torsemide in setting of  Cultures:   Lab Results   Component Value Date/Time    BC  12/01/2024 02:12 PM     No Growth to date.  Any change in status will be called.     Lab Results   Component Value Date/Time    BLOODCULT2  12/01/2024 02:12 PM     No Growth to date.  Any change in status will be called.     Organism:   Lab Results   Component Value Date/Time    ORG BHS Group B (Strep agalacticae) 07/05/2023 06:11 PM         Martina Kumar MD

## 2024-12-05 ENCOUNTER — ANESTHESIA (OUTPATIENT)
Dept: ENDOSCOPY | Age: 84
End: 2024-12-05
Payer: MEDICARE

## 2024-12-05 ENCOUNTER — ANESTHESIA EVENT (OUTPATIENT)
Dept: ENDOSCOPY | Age: 84
End: 2024-12-05
Payer: MEDICARE

## 2024-12-05 LAB
ANION GAP SERPL CALCULATED.3IONS-SCNC: 8 MMOL/L (ref 3–16)
BACTERIA BLD CULT ORG #2: NORMAL
BACTERIA BLD CULT: NORMAL
BASOPHILS # BLD: 0 K/UL (ref 0–0.2)
BASOPHILS NFR BLD: 0.5 %
BUN SERPL-MCNC: 19 MG/DL (ref 7–20)
CALCIUM SERPL-MCNC: 10.1 MG/DL (ref 8.3–10.6)
CHLORIDE SERPL-SCNC: 100 MMOL/L (ref 99–110)
CO2 SERPL-SCNC: 34 MMOL/L (ref 21–32)
CREAT SERPL-MCNC: 1.1 MG/DL (ref 0.6–1.2)
DEPRECATED RDW RBC AUTO: 14.3 % (ref 12.4–15.4)
EOSINOPHIL # BLD: 0.3 K/UL (ref 0–0.6)
EOSINOPHIL NFR BLD: 3.1 %
GFR SERPLBLD CREATININE-BSD FMLA CKD-EPI: 50 ML/MIN/{1.73_M2}
GLUCOSE BLD-MCNC: 129 MG/DL (ref 70–99)
GLUCOSE SERPL-MCNC: 100 MG/DL (ref 70–99)
HCT VFR BLD AUTO: 30.2 % (ref 36–48)
HGB BLD-MCNC: 9.5 G/DL (ref 12–16)
LYMPHOCYTES # BLD: 1.3 K/UL (ref 1–5.1)
LYMPHOCYTES NFR BLD: 15.8 %
MAGNESIUM SERPL-MCNC: 2.17 MG/DL (ref 1.8–2.4)
MCH RBC QN AUTO: 30 PG (ref 26–34)
MCHC RBC AUTO-ENTMCNC: 31.6 G/DL (ref 31–36)
MCV RBC AUTO: 95.2 FL (ref 80–100)
MONOCYTES # BLD: 0.4 K/UL (ref 0–1.3)
MONOCYTES NFR BLD: 5.4 %
NEUTROPHILS # BLD: 6.2 K/UL (ref 1.7–7.7)
NEUTROPHILS NFR BLD: 75.2 %
PERFORMED ON: ABNORMAL
PHOSPHATE SERPL-MCNC: 2 MG/DL (ref 2.5–4.9)
PLATELET # BLD AUTO: 255 K/UL (ref 135–450)
PMV BLD AUTO: 7.7 FL (ref 5–10.5)
POTASSIUM SERPL-SCNC: 3.9 MMOL/L (ref 3.5–5.1)
RBC # BLD AUTO: 3.17 M/UL (ref 4–5.2)
SODIUM SERPL-SCNC: 142 MMOL/L (ref 136–145)
WBC # BLD AUTO: 8.2 K/UL (ref 4–11)

## 2024-12-05 PROCEDURE — 36415 COLL VENOUS BLD VENIPUNCTURE: CPT

## 2024-12-05 PROCEDURE — 6370000000 HC RX 637 (ALT 250 FOR IP): Performed by: NURSE PRACTITIONER

## 2024-12-05 PROCEDURE — 7100000011 HC PHASE II RECOVERY - ADDTL 15 MIN: Performed by: INTERNAL MEDICINE

## 2024-12-05 PROCEDURE — 2709999900 HC NON-CHARGEABLE SUPPLY: Performed by: INTERNAL MEDICINE

## 2024-12-05 PROCEDURE — 94640 AIRWAY INHALATION TREATMENT: CPT

## 2024-12-05 PROCEDURE — 6360000002 HC RX W HCPCS: Performed by: NURSE PRACTITIONER

## 2024-12-05 PROCEDURE — 3700000000 HC ANESTHESIA ATTENDED CARE: Performed by: INTERNAL MEDICINE

## 2024-12-05 PROCEDURE — 6360000002 HC RX W HCPCS: Performed by: INTERNAL MEDICINE

## 2024-12-05 PROCEDURE — 0DJ08ZZ INSPECTION OF UPPER INTESTINAL TRACT, VIA NATURAL OR ARTIFICIAL OPENING ENDOSCOPIC: ICD-10-PCS | Performed by: INTERNAL MEDICINE

## 2024-12-05 PROCEDURE — 2580000003 HC RX 258: Performed by: INTERNAL MEDICINE

## 2024-12-05 PROCEDURE — 3609017100 HC EGD: Performed by: INTERNAL MEDICINE

## 2024-12-05 PROCEDURE — 2580000003 HC RX 258

## 2024-12-05 PROCEDURE — 85025 COMPLETE CBC W/AUTO DIFF WBC: CPT

## 2024-12-05 PROCEDURE — 84100 ASSAY OF PHOSPHORUS: CPT

## 2024-12-05 PROCEDURE — 3700000001 HC ADD 15 MINUTES (ANESTHESIA): Performed by: INTERNAL MEDICINE

## 2024-12-05 PROCEDURE — 2500000003 HC RX 250 WO HCPCS

## 2024-12-05 PROCEDURE — 6360000002 HC RX W HCPCS: Performed by: NURSE ANESTHETIST, CERTIFIED REGISTERED

## 2024-12-05 PROCEDURE — 2700000000 HC OXYGEN THERAPY PER DAY

## 2024-12-05 PROCEDURE — 83735 ASSAY OF MAGNESIUM: CPT

## 2024-12-05 PROCEDURE — 6370000000 HC RX 637 (ALT 250 FOR IP): Performed by: INTERNAL MEDICINE

## 2024-12-05 PROCEDURE — 80048 BASIC METABOLIC PNL TOTAL CA: CPT

## 2024-12-05 PROCEDURE — 94761 N-INVAS EAR/PLS OXIMETRY MLT: CPT

## 2024-12-05 PROCEDURE — 7100000010 HC PHASE II RECOVERY - FIRST 15 MIN: Performed by: INTERNAL MEDICINE

## 2024-12-05 PROCEDURE — 1200000000 HC SEMI PRIVATE

## 2024-12-05 RX ORDER — SODIUM CHLORIDE 9 MG/ML
INJECTION, SOLUTION INTRAVENOUS PRN
Status: DISCONTINUED | OUTPATIENT
Start: 2024-12-05 | End: 2024-12-05 | Stop reason: HOSPADM

## 2024-12-05 RX ORDER — DIPHENHYDRAMINE HYDROCHLORIDE 50 MG/ML
12.5 INJECTION INTRAMUSCULAR; INTRAVENOUS
Status: DISCONTINUED | OUTPATIENT
Start: 2024-12-05 | End: 2024-12-05 | Stop reason: HOSPADM

## 2024-12-05 RX ORDER — SODIUM CHLORIDE 0.9 % (FLUSH) 0.9 %
5-40 SYRINGE (ML) INJECTION EVERY 12 HOURS SCHEDULED
Status: DISCONTINUED | OUTPATIENT
Start: 2024-12-05 | End: 2024-12-05 | Stop reason: HOSPADM

## 2024-12-05 RX ORDER — OXYCODONE HYDROCHLORIDE 5 MG/1
5 TABLET ORAL PRN
Status: DISCONTINUED | OUTPATIENT
Start: 2024-12-05 | End: 2024-12-05 | Stop reason: HOSPADM

## 2024-12-05 RX ORDER — ONDANSETRON 2 MG/ML
4 INJECTION INTRAMUSCULAR; INTRAVENOUS
Status: DISCONTINUED | OUTPATIENT
Start: 2024-12-05 | End: 2024-12-05 | Stop reason: HOSPADM

## 2024-12-05 RX ORDER — PHENYLEPHRINE HCL IN 0.9% NACL 1 MG/10 ML
SYRINGE (ML) INTRAVENOUS
Status: DISCONTINUED | OUTPATIENT
Start: 2024-12-05 | End: 2024-12-05 | Stop reason: SDUPTHER

## 2024-12-05 RX ORDER — NALOXONE HYDROCHLORIDE 0.4 MG/ML
INJECTION, SOLUTION INTRAMUSCULAR; INTRAVENOUS; SUBCUTANEOUS PRN
Status: DISCONTINUED | OUTPATIENT
Start: 2024-12-05 | End: 2024-12-05 | Stop reason: HOSPADM

## 2024-12-05 RX ORDER — LABETALOL HYDROCHLORIDE 5 MG/ML
10 INJECTION, SOLUTION INTRAVENOUS
Status: DISCONTINUED | OUTPATIENT
Start: 2024-12-05 | End: 2024-12-05 | Stop reason: HOSPADM

## 2024-12-05 RX ORDER — LIDOCAINE HYDROCHLORIDE 20 MG/ML
INJECTION, SOLUTION INFILTRATION; PERINEURAL
Status: DISCONTINUED | OUTPATIENT
Start: 2024-12-05 | End: 2024-12-05 | Stop reason: SDUPTHER

## 2024-12-05 RX ORDER — PROPOFOL 10 MG/ML
INJECTION, EMULSION INTRAVENOUS
Status: DISCONTINUED | OUTPATIENT
Start: 2024-12-05 | End: 2024-12-05 | Stop reason: SDUPTHER

## 2024-12-05 RX ORDER — SODIUM CHLORIDE 0.9 % (FLUSH) 0.9 %
5-40 SYRINGE (ML) INJECTION PRN
Status: DISCONTINUED | OUTPATIENT
Start: 2024-12-05 | End: 2024-12-05 | Stop reason: HOSPADM

## 2024-12-05 RX ORDER — OXYCODONE HYDROCHLORIDE 5 MG/1
10 TABLET ORAL PRN
Status: DISCONTINUED | OUTPATIENT
Start: 2024-12-05 | End: 2024-12-05 | Stop reason: HOSPADM

## 2024-12-05 RX ADMIN — LORAZEPAM 0.5 MG: 0.5 TABLET ORAL at 19:54

## 2024-12-05 RX ADMIN — Medication 200 MCG: at 09:55

## 2024-12-05 RX ADMIN — BUDESONIDE 500 MCG: 0.5 SUSPENSION RESPIRATORY (INHALATION) at 19:23

## 2024-12-05 RX ADMIN — SODIUM CHLORIDE, PRESERVATIVE FREE 10 ML: 5 INJECTION INTRAVENOUS at 19:55

## 2024-12-05 RX ADMIN — METOPROLOL TARTRATE 25 MG: 25 TABLET, FILM COATED ORAL at 19:54

## 2024-12-05 RX ADMIN — METOPROLOL TARTRATE 25 MG: 25 TABLET, FILM COATED ORAL at 12:23

## 2024-12-05 RX ADMIN — CIPROFLOXACIN AND DEXAMETHASONE 4 DROP: 3; 1 SUSPENSION/ DROPS AURICULAR (OTIC) at 12:23

## 2024-12-05 RX ADMIN — ACETAMINOPHEN 650 MG: 325 TABLET ORAL at 23:31

## 2024-12-05 RX ADMIN — SODIUM PHOSPHATE, MONOBASIC, MONOHYDRATE AND SODIUM PHOSPHATE, DIBASIC, ANHYDROUS 15 MMOL: 276; 142 INJECTION, SOLUTION INTRAVENOUS at 12:19

## 2024-12-05 RX ADMIN — BUSPIRONE HYDROCHLORIDE 10 MG: 5 TABLET ORAL at 12:23

## 2024-12-05 RX ADMIN — ALBUTEROL SULFATE 2.5 MG: 2.5 SOLUTION RESPIRATORY (INHALATION) at 19:23

## 2024-12-05 RX ADMIN — GABAPENTIN 100 MG: 100 CAPSULE ORAL at 12:23

## 2024-12-05 RX ADMIN — ALBUTEROL SULFATE 2.5 MG: 2.5 SOLUTION RESPIRATORY (INHALATION) at 03:05

## 2024-12-05 RX ADMIN — ROFLUMILAST 500 MCG: 500 TABLET ORAL at 12:23

## 2024-12-05 RX ADMIN — Medication 1 CAPSULE: at 12:23

## 2024-12-05 RX ADMIN — GABAPENTIN 100 MG: 100 CAPSULE ORAL at 19:54

## 2024-12-05 RX ADMIN — CIPROFLOXACIN AND DEXAMETHASONE 4 DROP: 3; 1 SUSPENSION/ DROPS AURICULAR (OTIC) at 20:14

## 2024-12-05 RX ADMIN — SODIUM CHLORIDE, PRESERVATIVE FREE 10 ML: 5 INJECTION INTRAVENOUS at 12:20

## 2024-12-05 RX ADMIN — ALBUTEROL SULFATE 2.5 MG: 2.5 SOLUTION RESPIRATORY (INHALATION) at 15:15

## 2024-12-05 RX ADMIN — GUAIFENESIN 600 MG: 600 TABLET ORAL at 12:23

## 2024-12-05 RX ADMIN — PANTOPRAZOLE SODIUM 40 MG: 40 INJECTION, POWDER, FOR SOLUTION INTRAVENOUS at 19:54

## 2024-12-05 RX ADMIN — ESCITALOPRAM OXALATE 10 MG: 10 TABLET ORAL at 12:23

## 2024-12-05 RX ADMIN — PANTOPRAZOLE SODIUM 40 MG: 40 INJECTION, POWDER, FOR SOLUTION INTRAVENOUS at 12:20

## 2024-12-05 RX ADMIN — LIDOCAINE HYDROCHLORIDE 50 MG: 20 INJECTION, SOLUTION INFILTRATION; PERINEURAL at 09:47

## 2024-12-05 RX ADMIN — ACETAMINOPHEN 650 MG: 325 TABLET ORAL at 17:08

## 2024-12-05 RX ADMIN — ATORVASTATIN CALCIUM 40 MG: 40 TABLET, FILM COATED ORAL at 19:55

## 2024-12-05 RX ADMIN — ONDANSETRON 4 MG: 2 INJECTION INTRAMUSCULAR; INTRAVENOUS at 08:52

## 2024-12-05 RX ADMIN — ALBUTEROL SULFATE 2.5 MG: 2.5 SOLUTION RESPIRATORY (INHALATION) at 11:28

## 2024-12-05 RX ADMIN — APIXABAN 2.5 MG: 2.5 TABLET, FILM COATED ORAL at 19:55

## 2024-12-05 RX ADMIN — PROPOFOL 50 MG: 10 INJECTION, EMULSION INTRAVENOUS at 09:47

## 2024-12-05 ASSESSMENT — PAIN SCALES - GENERAL
PAINLEVEL_OUTOF10: 0
PAINLEVEL_OUTOF10: 3
PAINLEVEL_OUTOF10: 2
PAINLEVEL_OUTOF10: 0
PAINLEVEL_OUTOF10: 0
PAINLEVEL_OUTOF10: 6

## 2024-12-05 ASSESSMENT — ENCOUNTER SYMPTOMS: SHORTNESS OF BREATH: 1

## 2024-12-05 ASSESSMENT — PAIN - FUNCTIONAL ASSESSMENT
PAIN_FUNCTIONAL_ASSESSMENT: 0-10
PAIN_FUNCTIONAL_ASSESSMENT: PREVENTS OR INTERFERES SOME ACTIVE ACTIVITIES AND ADLS

## 2024-12-05 ASSESSMENT — PAIN DESCRIPTION - LOCATION
LOCATION: BACK
LOCATION: ABDOMEN;CHEST

## 2024-12-05 ASSESSMENT — PAIN DESCRIPTION - DESCRIPTORS
DESCRIPTORS: DISCOMFORT;ACHING
DESCRIPTORS: ACHING

## 2024-12-05 ASSESSMENT — PAIN DESCRIPTION - ORIENTATION: ORIENTATION: MID;LOWER

## 2024-12-05 NOTE — PLAN OF CARE
Problem: Chronic Conditions and Co-morbidities  Goal: Patient's chronic conditions and co-morbidity symptoms are monitored and maintained or improved  12/5/2024 1347 by Kailyn Anthony RN  Outcome: Progressing  Flowsheets (Taken 12/5/2024 1347)  Care Plan - Patient's Chronic Conditions and Co-Morbidity Symptoms are Monitored and Maintained or Improved:   Monitor and assess patient's chronic conditions and comorbid symptoms for stability, deterioration, or improvement   Collaborate with multidisciplinary team to address chronic and comorbid conditions and prevent exacerbation or deterioration     Problem: Safety - Adult  Goal: Free from fall injury  12/5/2024 1347 by Kailyn Anthony RN  Outcome: Progressing  Flowsheets (Taken 12/5/2024 1347)  Free From Fall Injury:   Instruct family/caregiver on patient safety   Based on caregiver fall risk screen, instruct family/caregiver to ask for assistance with transferring infant if caregiver noted to have fall risk factors     Problem: Cardiovascular - Adult  Goal: Maintains optimal cardiac output and hemodynamic stability  Outcome: Progressing  Flowsheets (Taken 12/5/2024 1347)  Maintains optimal cardiac output and hemodynamic stability:   Monitor urine output and notify Licensed Independent Practitioner for values outside of normal range   Monitor blood pressure and heart rate     Problem: Musculoskeletal - Adult  Goal: Return mobility to safest level of function  Outcome: Progressing  Flowsheets (Taken 12/5/2024 1347)  Return Mobility to Safest Level of Function:   Assess patient stability and activity tolerance for standing, transferring and ambulating with or without assistive devices   Assist with transfers and ambulation using safe patient handling equipment as needed   Ensure adequate protection for wounds/incisions during mobilization     Problem: Infection - Adult  Goal: Absence of infection at discharge  Outcome: Progressing  Flowsheets (Taken  12/5/2024 1347)  Absence of infection at discharge: Assess and monitor for signs and symptoms of infection     Problem: Metabolic/Fluid and Electrolytes - Adult  Goal: Electrolytes maintained within normal limits  Outcome: Progressing     Problem: Hematologic - Adult  Goal: Maintains hematologic stability  Outcome: Progressing     Problem: Respiratory - Adult  Goal: Achieves optimal ventilation and oxygenation  12/5/2024 1347 by Kailyn Anthony RN  Outcome: Progressing  Flowsheets (Taken 12/5/2024 1347)  Achieves optimal ventilation and oxygenation:   Assess for changes in respiratory status   Assess for changes in mentation and behavior   Position to facilitate oxygenation and minimize respiratory effort   Oxygen supplementation based on oxygen saturation or arterial blood gases

## 2024-12-05 NOTE — PROGRESS NOTES
Transferred to inpatient hospital room 358 via gurney accompanied by transport staff  CMU notified  Calm  VSS  Iv site assessed without evidence of infiltration on arrival  Respiration  easy unlabored - auscultated clear  / bilaterally anterior and posterior fields  Abd soft nondistended : BS+x4 quadrants : no nausea

## 2024-12-05 NOTE — PROGRESS NOTES
Occupational Therapy    Pt off floor (endo) at time of attempt. Will follow up as schedule allows.     Ebonie Abdi, OTR/L

## 2024-12-05 NOTE — PROGRESS NOTES
4 Eyes Skin Assessment     NAME:  Terri Smith  YOB: 1940  MEDICAL RECORD NUMBER:  [id number]    The patient is being assessed for  Other Low shaji    I agree that at least one RN has performed a thorough Head to Toe Skin Assessment on the patient. ALL assessment sites listed below have been assessed.      Areas assessed by both nurses:    Head, Face, Ears, Shoulders, Back, Chest, Arms, Elbows, Hands, Sacrum. Buttock, Coccyx, Ischium, Legs. Feet and Heels, and Under Medical Devices         Does the Patient have a Wound? No noted wound(s)       Shaji Prevention initiated by RN: Yes  Wound Care Orders initiated by RN: No    Pressure Injury (Stage 3,4, Unstageable, DTI, NWPT, and Complex wounds) if present, place Wound referral order by RN under : No    New Ostomies, if present place, Ostomy referral order under : No     Nurse 1 eSignature: Electronically signed by Shimon Beltrán RN on 12/5/24 at 5:20 AM EST    **SHARE this note so that the co-signing nurse can place an eSignature**    Nurse 2 eSignature: Electronically signed by Jerome Mcghee RN on 12/5/24 at 5:22 AM EST

## 2024-12-05 NOTE — PROGRESS NOTES
[facility] Progress Note           Subjective/   84 y.o. year old female who we are seeing in consultation for NADJA.     HPI:  Renal function better with fluids.  Shortness of breath improving, remains on 3 L NC (uses 2 L NC at home).    ROS:  +weak, intake improving.    Objective/   GEN:  Chronically ill, /68   Pulse 81   Temp 98.1 °F (36.7 °C) (Axillary)   Resp 18   Ht 1.626 m (5' 4.02\")   Wt 45.5 kg (100 lb 5 oz)   SpO2 100%   BMI 17.21 kg/m²   HEENT: non-icteric, no JVD  CV: S1, S2 without m/r/g; no LE edema  RESP: CTA B without w/r/r; breathing wnl  ABD: +bs, soft, nt, no hsm  SKIN: warm, no rashes    Data/  Recent Labs     12/03/24  0603 12/04/24  0606 12/05/24  0600   WBC 10.1 8.0 8.2   HGB 10.4* 9.2* 9.5*   HCT 32.8* 28.5* 30.2*   MCV 97.2 95.3 95.2    247 255     Recent Labs     12/03/24  0603 12/04/24  0606 12/05/24  0600    141 142   K 4.1 4.2 3.9   CL 97* 99 100   CO2 32 33* 34*   GLUCOSE 100* 107* 100*   PHOS 4.0 2.9 2.0*   MG 2.35 2.21 2.17   BUN 28* 25* 19   CREATININE 2.0* 1.4* 1.1   LABGLOM 24* 37* 50*     UA bland  Urine sodium and chloride less than 20    Assessment/     - Acute kidney injury - suspected recurrent pre-renal state   SCr up to 2.0, better with fluids, back below baseline of 1.4-1.6     - Chronic kidney disease stage 3b     - Diastolic CHF     - COPD - on 2 L NC at home      Plan/     - Trial off fluids from 12/4  - Holding torsemide, can resume cautiously at lower dose given recurrent tendency for volume depletion and NADJA  - Trend labs, bp's, weights, & urine output      ____________________________________  Dmitri Naik MD  [facility]  [email or web address]  Office: [phone]

## 2024-12-05 NOTE — ANESTHESIA PRE PROCEDURE
Department of Anesthesiology  Preprocedure Note       Name:  Terri Smith   Age:  84 y.o.  :  1940                                          MRN:  [id number]         Date:  2024      Surgeon: Surgeon(s):  Rowdy Schmitz DO    Procedure: Procedure(s):  ESOPHAGOGASTRODUODENOSCOPY    Medications prior to admission:   Prior to Admission medications    Medication Sig Start Date End Date Taking? Authorizing Provider   budesonide (PULMICORT) 0.5 MG/2ML nebulizer suspension Take 2 mLs by nebulization in the morning and 2 mLs in the evening. 24  Yes Mena Calzada MD   gabapentin (NEURONTIN) 100 MG capsule Take 1 capsule by mouth 3 times daily for 30 days. 24 Yes Mena Calzada MD   escitalopram (LEXAPRO) 10 MG tablet Take 1 tablet by mouth daily 24  Yes Mena Calzada MD   guaiFENesin (MUCINEX) 600 MG extended release tablet Take 1 tablet by mouth daily 24 Yes Mena Calzada MD   fluticasone (FLONASE) 50 MCG/ACT nasal spray 1 spray by Each Nostril route daily as needed for Rhinitis 24  Yes Mena Calzada MD   levothyroxine (SYNTHROID) 25 MCG tablet Take 1 tablet by mouth Daily for 120 doses 11/26/24 3/26/25 Yes Mena Calzada MD   pantoprazole (PROTONIX) 40 MG tablet Take 1 tablet by mouth in the morning and at bedtime 24  Yes Mena Calzada MD   potassium chloride (KLOR-CON M) 10 MEQ extended release tablet Take 2 tablets by mouth daily for 5 days 24 Yes Mena Calzada MD   ferrous sulfate (IRON 325) 325 (65 Fe) MG tablet Take 1 tablet by mouth daily (with breakfast)   Yes Charmaine Gant MD   LORazepam (ATIVAN) 0.5 MG tablet Take 1 tablet by mouth every 6 hours as needed for Anxiety.   Yes Charmaine Gant MD   ELIQUIS 2.5 MG TABS tablet Take 1 tablet by mouth 2 times daily 10/2/23  Yes Provider, Historical, MD   metoprolol tartrate (LOPRESSOR) 25 MG tablet Take 1 tablet by mouth in the morning and at

## 2024-12-05 NOTE — PROGRESS NOTES
Speech Language Pathology  Attempt Note     Name: Terri Smith  : 1940  Medical Diagnosis: Hypomagnesemia [E83.42]  Dizziness [R42]  Vertigo [R42]  Ambulatory dysfunction [R26.2]  Chronic obstructive pulmonary disease, unspecified COPD type (HCC) [J44.9]  Chronic kidney disease, unspecified CKD stage [N18.9]      SLP attempted to see pt for dysphagia tx. Noted re-eval order placed by MD, with pt already on caseload and SLP attempting to f/u at bedside. Pt currently off the unit per RN for EGD. SLP to re-attempt as pt's condition and schedule allows. RN aware. No charges.    Thank you,    Jeri Fallon M.A. CCC-SLP   Speech-Language Pathologist

## 2024-12-05 NOTE — ANESTHESIA POSTPROCEDURE EVALUATION
Department of Anesthesiology  Postprocedure Note    Patient: Terri Smith  MRN: [id number]  YOB: 1940  Date of evaluation: 12/5/2024    Procedure Summary       Date: 12/05/24 Room / Location: [place] [id number] / [facility]    Anesthesia Start: 0940 Anesthesia Stop: 0957    Procedure: ESOPHAGOGASTRODUODENOSCOPY Diagnosis:       Melena      (Melena [K92.1])    Surgeons: oRwdy Schmitz DO Responsible Provider: You Asif MD    Anesthesia Type: MAC ASA Status: 3            Anesthesia Type: No value filed.    David Phase I: David Score: 10    David Phase II: David Score: 9    Anesthesia Post Evaluation    Patient location during evaluation: PACU  Patient participation: complete - patient participated  Level of consciousness: awake and alert  Airway patency: patent  Nausea & Vomiting: no nausea and no vomiting  Cardiovascular status: blood pressure returned to baseline  Respiratory status: acceptable  Hydration status: euvolemic  Comments: VSS on transfer to phase 2 recovery.  No anesthetic complications.  Pain management: adequate    No notable events documented.

## 2024-12-05 NOTE — H&P
Gastroenterology Preop Assessment    Patient:   Terri Smith   :    1940   Facility:   [facility]  Referring/PCP: Jackson Ontiveros MD  Date:     2024    Subjective:   Procedure: egd    HPI/Reason for procedure:  melena    Past Medical History:   Diagnosis Date    NADJA (acute kidney injury) (HCC)     Asthma     Atrial fibrillation with RVR (HCC)     CAD (coronary artery disease)     CHF (congestive heart failure) (HCC)     unsure    Chronic anxiety     COPD (chronic obstructive pulmonary disease) (HCC)     COPD (chronic obstructive pulmonary disease) (HCC) 2023    GERD (gastroesophageal reflux disease) 2021    Heart attack (HCC) 2019    History of blood transfusion     Hyperlipidemia     Hypertension     MRSA (methicillin resistant staph aureus) culture positive 2019    + resp cx    OA (osteoarthritis) 2014    Thyroid disease      Past Surgical History:   Procedure Laterality Date    BRONCHOSCOPY  2019    Dr. Wheatley - w/BAL    CARDIAC CATHETERIZATION  2019    Dr. Palomares     SECTION      COLONOSCOPY N/A 2020    COLONOSCOPY DIAGNOSTIC performed by Rowdy Schmitz DO at [facility] ENDOSCOPY    COLONOSCOPY N/A 10/22/2021    COLONOSCOPY POLYPECTOMY SNARE/COLD BIOPSY performed by Celestine Lester MD at [facility] ENDOSCOPY    COLONOSCOPY N/A 2023    COLONOSCOPY POLYPECTOMY SNARE performed by Darron Langford MD at [facility] ENDOSCOPY    CORONARY ARTERY BYPASS GRAFT N/A 2019    OFF PUMP CORONARY ARTERY BYPASS GRAFTING X2, INTERNAL MAMMARY ARTERY, SAPHENOUS VEIN GRAFT performed by Yolanda Chase MD at [facility] CVOR    IR MIDLINE CATH  2021    IR MIDLINE CATH 2021 [facility] SPECIAL PROCEDURES    MASTECTOMY, PARTIAL Left     SIGMOIDOSCOPY  2020    4 bands    SIGMOIDOSCOPY N/A 2020    FLEX SIG W/ BANDING SIGMOIDOSCOPY DIAGNOSTIC FLEXIBLE performed by Rowdy Schmitz DO at [facility] ENDOSCOPY    SIGMOIDOSCOPY  flush 0.9 % injection 5-40 mL, 5-40 mL, IntraVENous, 2 times per day, José Manuel Nazario MD, 10 mL at 12/04/24 2005    sodium chloride flush 0.9 % injection 5-40 mL, 5-40 mL, IntraVENous, PRN, José Manuel Nazario MD    0.9 % sodium chloride infusion, , IntraVENous, PRN, José Manuel Nazario MD    ondansetron (ZOFRAN-ODT) disintegrating tablet 4 mg, 4 mg, Oral, Q8H PRN **OR** ondansetron (ZOFRAN) injection 4 mg, 4 mg, IntraVENous, Q6H PRN, José Manuel Nazario MD, 4 mg at 12/05/24 0852    acetaminophen (TYLENOL) tablet 650 mg, 650 mg, Oral, Q6H PRN, 650 mg at 12/04/24 1850 **OR** acetaminophen (TYLENOL) suppository 650 mg, 650 mg, Rectal, Q6H PRN, José Manuel Nazario MD    polyethylene glycol (GLYCOLAX) packet 17 g, 17 g, Oral, Daily PRN, José Manuel Nazario MD    ciprofloxacin-dexAMETHasone (CIPRODEX) otic suspension 4 drop, 4 drop, Right Ear, BID, José Manuel Nazario MD, 4 drop at 12/04/24 2005    LORazepam (ATIVAN) tablet 0.5 mg, 0.5 mg, Oral, Q8H PRN, José Manuel Nazario MD, 0.5 mg at 12/04/24 0936    albuterol (PROVENTIL) (2.5 MG/3ML) 0.083% nebulizer solution 2.5 mg, 2.5 mg, Nebulization, 4x Daily RT, José Manuel Nazario MD, 2.5 mg at 12/04/24 1905      Infusions:    sodium chloride       PRN Medications: meclizine, albuterol, busPIRone, fluticasone, sodium chloride flush, sodium chloride, ondansetron **OR** ondansetron, acetaminophen **OR** acetaminophen, polyethylene glycol, LORazepam  Allergies:   Allergies   Allergen Reactions    Latex Other (See Comments)     Burning    Other reaction(s): Dermatitis, Other (See Comments)  Burning  Burning      Codeine Other (See Comments)     \"hallucinations\"  Other reaction(s): Other (See Comments)  \"hallucinations\"  Hallucinations    Hallucinations           Objective:     Physical Exam:   BP (!) 124/57   Pulse 89   Temp 98.3 °F (36.8 °C)   Resp 20   Ht 1.626 m (5' 4.02\")   Wt 45.5 kg (100 lb 5 oz)   SpO2 99%   BMI 17.21 kg/m²     HEENT: NCAT  Lungs: CTAB  CV: RRR  Abd: soft, ntd  Ext: dpi    Lab and Imaging

## 2024-12-05 NOTE — PROGRESS NOTES
[facility] Medicine Progress Note  V 10.25      Date of Admission: 12/1/2024    Hospital Day: 5      Chief Admission Complaint:  Weakness, dizziness    Subjective:  Patient seen at bedside this morning. Patient denies any pain anywhere, dizziness at this time, fevers, chills, diarrhea or constipation, nausea or vomiting.    Presenting Admission History:       84 y.o. female  with PMHx significant for  COPD/on home oxygen 2 liters, HFpEF, CKD, paroxysmal A-fib/on long-term anticoagulation with Eliquis, hypertension, hyperlipidemia, anxiety/depression  has been on long term BZO.  She presented to [facility] ED with complaint of increased weakness, dizziness and difficulty walking.     Of pertinence is she had a recent admission to [facility] from 11/21/2024 to 11/25/2024.   She presented that admission with chest congestion, cough and shortness of breath.  She states she was doing well until the past 2 days when she developed increased dizziness, difficulty with walking.  Her daughter who is at the bedside also states she has not been eating or drinking well for the past 2 days.  No complaint of chest pain, no shortness of breath, no fever or chills.  She is now being admitted for further evaluation and management.    Assessment/Plan:      Current Principal Problem:  Vertigo    Vertigo  Plan:  Encourage hydration PO  IVF as needed  Antiemetics PRN in place  Meclizine as needed  Continue to monitor  Orthostatic vital signs    #Melena  #Oropharyngeal dysphagia  Plan:  Gastroenterology consulted, appreciate recommendations  EGD planned for today  PPI IV BID in place  Continue to monitor H&H    NADJA on CKD, improving  Plan:  Encourage hydration PO  IVF in place as needed  Avoid nephrotoxic medications    COPD  She is normally at home oxygen 2 L continuous  Continue bronchodilators     HFpEF   History is noted.  Her last echocardiogram was in 6/2024.  It did reveal LV systolic function with estimated EF 60 to  further workup     Blood Cultures:   Lab Results   Component Value Date/Time    BC  12/01/2024 02:12 PM     No Growth to date.  Any change in status will be called.     Lab Results   Component Value Date/Time    BLOODCULT2  12/01/2024 02:12 PM     No Growth to date.  Any change in status will be called.     Organism:   Lab Results   Component Value Date/Time    ORG BHS Group B (Strep agalacticae) 07/05/2023 06:11 PM         Martina Kumar MD

## 2024-12-05 NOTE — PLAN OF CARE
Problem: Chronic Conditions and Co-morbidities  Goal: Patient's chronic conditions and co-morbidity symptoms are monitored and maintained or improved  Outcome: Progressing  Flowsheets (Taken 12/4/2024 1015 by Emelia Tran RN)  Care Plan - Patient's Chronic Conditions and Co-Morbidity Symptoms are Monitored and Maintained or Improved: Monitor and assess patient's chronic conditions and comorbid symptoms for stability, deterioration, or improvement     Problem: Discharge Planning  Goal: Discharge to home or other facility with appropriate resources  Outcome: Progressing  Flowsheets (Taken 12/4/2024 1015 by Emelia Tran RN)  Discharge to home or other facility with appropriate resources: Identify barriers to discharge with patient and caregiver     Problem: Pain  Goal: Verbalizes/displays adequate comfort level or baseline comfort level  Outcome: Progressing     Problem: Safety - Adult  Goal: Free from fall injury  Outcome: Progressing     Problem: Respiratory - Adult  Goal: Achieves optimal ventilation and oxygenation  Outcome: Progressing  Flowsheets (Taken 12/4/2024 1015 by Emelia Tran, RN)  Achieves optimal ventilation and oxygenation: Assess for changes in respiratory status

## 2024-12-05 NOTE — PROGRESS NOTES
CHF Care Plan      Patient's EF (Ejection Fraction) is greater than 40%    Heart Failure Medications:  Diuretics:: None    (One of the following REQUIRED for EF </= 40%/SYSTOLIC FAILURE but MAY be used in EF% >40%/DIASTOLIC FAILURE)        ACE:: None        ARB:: None         ARNI:: None    (Beta Blockers)  NON- Evidenced Based Beta Blocker (for EF% >40%/DIASTOLIC FAILURE): Metoprolol TARTrate- Lopressor    Evidenced Based Beta Blocker::(REQUIRED for EF% <40%/SYSTOLIC FAILURE) None  ...................................................................................................................................................    Failed to redirect to the Timeline version of the [employer].      Patient's weights and intake/output reviewed    Daily Weight log at bedside, patient/family participation in use of log: pt unable to participate\" (family aware)    Patient's current weight today shows a difference of 3 lbs less than last documented weight.      Intake/Output Summary (Last 24 hours) at 12/5/2024 8514  Last data filed at 12/4/2024 2004  Gross per 24 hour   Intake 340 ml   Output --   Net 340 ml       Education Booklet Provided: yes    Comorbidities Reviewed Yes    Patient has a past medical history of NADJA (acute kidney injury) ([facility]), Asthma, Atrial fibrillation with RVR ([facility]), CAD (coronary artery disease), CHF (congestive heart failure) ([facility]), Chronic anxiety, COPD (chronic obstructive pulmonary disease) (HCC), COPD (chronic obstructive pulmonary disease) (HCC), GERD (gastroesophageal reflux disease), Heart attack (HCC), History of blood transfusion, Hyperlipidemia, Hypertension, MRSA (methicillin resistant staph aureus) culture positive, OA (osteoarthritis), and Thyroid disease.     >>For CHF and Comorbidity documentation on Education Time and Topics, please see Education Tab      CHF Education    Learners:  Patient and Family  Readineess:   Acceptance  Method:   Explanation and Teachback  Response:

## 2024-12-06 VITALS
BODY MASS INDEX: 17.13 KG/M2 | RESPIRATION RATE: 18 BRPM | TEMPERATURE: 97.5 F | HEART RATE: 76 BPM | DIASTOLIC BLOOD PRESSURE: 72 MMHG | OXYGEN SATURATION: 97 % | HEIGHT: 64 IN | SYSTOLIC BLOOD PRESSURE: 128 MMHG | WEIGHT: 100.31 LBS

## 2024-12-06 LAB
ANION GAP SERPL CALCULATED.3IONS-SCNC: 9 MMOL/L (ref 3–16)
BASE EXCESS BLDV CALC-SCNC: 5.3 MMOL/L (ref -3–3)
BASOPHILS # BLD: 0 K/UL (ref 0–0.2)
BASOPHILS NFR BLD: 0.3 %
BUN SERPL-MCNC: 16 MG/DL (ref 7–20)
CALCIUM SERPL-MCNC: 9.8 MG/DL (ref 8.3–10.6)
CHLORIDE SERPL-SCNC: 103 MMOL/L (ref 99–110)
CO2 BLDV-SCNC: 34 MMOL/L
CO2 SERPL-SCNC: 31 MMOL/L (ref 21–32)
COHGB MFR BLDV: 2.2 % (ref 0–1.5)
CREAT SERPL-MCNC: 1.1 MG/DL (ref 0.6–1.2)
DEPRECATED RDW RBC AUTO: 14.8 % (ref 12.4–15.4)
EOSINOPHIL # BLD: 0.1 K/UL (ref 0–0.6)
EOSINOPHIL NFR BLD: 1.8 %
GFR SERPLBLD CREATININE-BSD FMLA CKD-EPI: 50 ML/MIN/{1.73_M2}
GLUCOSE SERPL-MCNC: 103 MG/DL (ref 70–99)
HCO3 BLDV-SCNC: 32.1 MMOL/L (ref 23–29)
HCT VFR BLD AUTO: 28.9 % (ref 36–48)
HGB BLD-MCNC: 9.1 G/DL (ref 12–16)
LYMPHOCYTES # BLD: 1.2 K/UL (ref 1–5.1)
LYMPHOCYTES NFR BLD: 15.1 %
MAGNESIUM SERPL-MCNC: 2.13 MG/DL (ref 1.8–2.4)
MCH RBC QN AUTO: 30.5 PG (ref 26–34)
MCHC RBC AUTO-ENTMCNC: 31.5 G/DL (ref 31–36)
MCV RBC AUTO: 96.9 FL (ref 80–100)
METHGB MFR BLDV: 0.2 %
MONOCYTES # BLD: 0.4 K/UL (ref 0–1.3)
MONOCYTES NFR BLD: 5 %
NEUTROPHILS # BLD: 6.2 K/UL (ref 1.7–7.7)
NEUTROPHILS NFR BLD: 77.8 %
O2 THERAPY: ABNORMAL
PCO2 BLDV: 59.4 MMHG (ref 40–50)
PH BLDV: 7.35 [PH] (ref 7.35–7.45)
PHOSPHATE SERPL-MCNC: 3.6 MG/DL (ref 2.5–4.9)
PLATELET # BLD AUTO: 247 K/UL (ref 135–450)
PMV BLD AUTO: 7.8 FL (ref 5–10.5)
PO2 BLDV: 35.4 MMHG (ref 25–40)
POTASSIUM SERPL-SCNC: 4.1 MMOL/L (ref 3.5–5.1)
RBC # BLD AUTO: 2.98 M/UL (ref 4–5.2)
SAO2 % BLDV: 66 %
SODIUM SERPL-SCNC: 143 MMOL/L (ref 136–145)
WBC # BLD AUTO: 8 K/UL (ref 4–11)

## 2024-12-06 PROCEDURE — 6370000000 HC RX 637 (ALT 250 FOR IP): Performed by: NURSE PRACTITIONER

## 2024-12-06 PROCEDURE — 80048 BASIC METABOLIC PNL TOTAL CA: CPT

## 2024-12-06 PROCEDURE — 94761 N-INVAS EAR/PLS OXIMETRY MLT: CPT

## 2024-12-06 PROCEDURE — 97110 THERAPEUTIC EXERCISES: CPT

## 2024-12-06 PROCEDURE — 83735 ASSAY OF MAGNESIUM: CPT

## 2024-12-06 PROCEDURE — 84100 ASSAY OF PHOSPHORUS: CPT

## 2024-12-06 PROCEDURE — 97530 THERAPEUTIC ACTIVITIES: CPT

## 2024-12-06 PROCEDURE — 2700000000 HC OXYGEN THERAPY PER DAY

## 2024-12-06 PROCEDURE — 6370000000 HC RX 637 (ALT 250 FOR IP): Performed by: INTERNAL MEDICINE

## 2024-12-06 PROCEDURE — 82803 BLOOD GASES ANY COMBINATION: CPT

## 2024-12-06 PROCEDURE — 92526 ORAL FUNCTION THERAPY: CPT

## 2024-12-06 PROCEDURE — 36415 COLL VENOUS BLD VENIPUNCTURE: CPT

## 2024-12-06 PROCEDURE — 6360000002 HC RX W HCPCS: Performed by: INTERNAL MEDICINE

## 2024-12-06 PROCEDURE — 6360000002 HC RX W HCPCS: Performed by: NURSE PRACTITIONER

## 2024-12-06 PROCEDURE — 94640 AIRWAY INHALATION TREATMENT: CPT

## 2024-12-06 PROCEDURE — 2580000003 HC RX 258: Performed by: INTERNAL MEDICINE

## 2024-12-06 PROCEDURE — 85025 COMPLETE CBC W/AUTO DIFF WBC: CPT

## 2024-12-06 PROCEDURE — 2580000003 HC RX 258

## 2024-12-06 RX ORDER — MIDODRINE HYDROCHLORIDE 5 MG/1
2.5 TABLET ORAL 2 TIMES DAILY PRN
Status: DISCONTINUED | OUTPATIENT
Start: 2024-12-06 | End: 2024-12-06

## 2024-12-06 RX ORDER — MIDODRINE HYDROCHLORIDE 5 MG/1
5 TABLET ORAL 2 TIMES DAILY PRN
Qty: 90 TABLET | Refills: 3 | Status: SHIPPED | OUTPATIENT
Start: 2024-12-06

## 2024-12-06 RX ORDER — MECLIZINE HCL 12.5 MG 12.5 MG/1
12.5 TABLET ORAL 3 TIMES DAILY PRN
Qty: 90 TABLET | Refills: 0 | Status: SHIPPED | OUTPATIENT
Start: 2024-12-06 | End: 2025-01-05

## 2024-12-06 RX ORDER — MIDODRINE HYDROCHLORIDE 5 MG/1
5 TABLET ORAL 2 TIMES DAILY PRN
Status: DISCONTINUED | OUTPATIENT
Start: 2024-12-06 | End: 2024-12-06 | Stop reason: HOSPADM

## 2024-12-06 RX ORDER — PANTOPRAZOLE SODIUM 40 MG/1
40 TABLET, DELAYED RELEASE ORAL
Status: DISCONTINUED | OUTPATIENT
Start: 2024-12-07 | End: 2024-12-06 | Stop reason: HOSPADM

## 2024-12-06 RX ORDER — SODIUM CHLORIDE, SODIUM LACTATE, POTASSIUM CHLORIDE, CALCIUM CHLORIDE 600; 310; 30; 20 MG/100ML; MG/100ML; MG/100ML; MG/100ML
INJECTION, SOLUTION INTRAVENOUS ONCE
Status: DISCONTINUED | OUTPATIENT
Start: 2024-12-06 | End: 2024-12-06

## 2024-12-06 RX ORDER — MIDODRINE HYDROCHLORIDE 2.5 MG/1
2.5 TABLET ORAL 2 TIMES DAILY PRN
Qty: 90 TABLET | Refills: 3 | Status: SHIPPED | OUTPATIENT
Start: 2024-12-06 | End: 2024-12-06 | Stop reason: HOSPADM

## 2024-12-06 RX ORDER — SODIUM CHLORIDE, SODIUM LACTATE, POTASSIUM CHLORIDE, CALCIUM CHLORIDE 600; 310; 30; 20 MG/100ML; MG/100ML; MG/100ML; MG/100ML
INJECTION, SOLUTION INTRAVENOUS ONCE
Status: COMPLETED | OUTPATIENT
Start: 2024-12-06 | End: 2024-12-06

## 2024-12-06 RX ORDER — METOPROLOL TARTRATE 25 MG/1
12.5 TABLET, FILM COATED ORAL 2 TIMES DAILY
Qty: 60 TABLET | Refills: 1 | Status: SHIPPED | OUTPATIENT
Start: 2024-12-06

## 2024-12-06 RX ORDER — FUROSEMIDE 20 MG/1
20 TABLET ORAL DAILY
Qty: 30 TABLET | Refills: 2 | Status: SHIPPED | OUTPATIENT
Start: 2024-12-06

## 2024-12-06 RX ADMIN — ACETAMINOPHEN 650 MG: 325 TABLET ORAL at 04:10

## 2024-12-06 RX ADMIN — ALBUTEROL SULFATE 2.5 MG: 2.5 SOLUTION RESPIRATORY (INHALATION) at 16:04

## 2024-12-06 RX ADMIN — Medication 1 CAPSULE: at 08:32

## 2024-12-06 RX ADMIN — APIXABAN 2.5 MG: 2.5 TABLET, FILM COATED ORAL at 08:32

## 2024-12-06 RX ADMIN — PANTOPRAZOLE SODIUM 40 MG: 40 INJECTION, POWDER, FOR SOLUTION INTRAVENOUS at 08:32

## 2024-12-06 RX ADMIN — GABAPENTIN 100 MG: 100 CAPSULE ORAL at 08:32

## 2024-12-06 RX ADMIN — CIPROFLOXACIN AND DEXAMETHASONE 4 DROP: 3; 1 SUSPENSION/ DROPS AURICULAR (OTIC) at 08:32

## 2024-12-06 RX ADMIN — GABAPENTIN 100 MG: 100 CAPSULE ORAL at 13:59

## 2024-12-06 RX ADMIN — LORAZEPAM 0.5 MG: 0.5 TABLET ORAL at 17:34

## 2024-12-06 RX ADMIN — BUSPIRONE HYDROCHLORIDE 10 MG: 5 TABLET ORAL at 13:59

## 2024-12-06 RX ADMIN — SODIUM CHLORIDE, PRESERVATIVE FREE 5 ML: 5 INJECTION INTRAVENOUS at 08:33

## 2024-12-06 RX ADMIN — METOPROLOL TARTRATE 25 MG: 25 TABLET, FILM COATED ORAL at 08:33

## 2024-12-06 RX ADMIN — ESCITALOPRAM OXALATE 10 MG: 10 TABLET ORAL at 08:33

## 2024-12-06 RX ADMIN — GUAIFENESIN 600 MG: 600 TABLET ORAL at 08:32

## 2024-12-06 RX ADMIN — LEVOTHYROXINE SODIUM 25 MCG: 0.03 TABLET ORAL at 05:49

## 2024-12-06 RX ADMIN — ACETAMINOPHEN 650 MG: 325 TABLET ORAL at 17:34

## 2024-12-06 RX ADMIN — ACETAMINOPHEN 650 MG: 325 TABLET ORAL at 09:24

## 2024-12-06 RX ADMIN — SODIUM CHLORIDE, POTASSIUM CHLORIDE, SODIUM LACTATE AND CALCIUM CHLORIDE: 600; 310; 30; 20 INJECTION, SOLUTION INTRAVENOUS at 12:25

## 2024-12-06 RX ADMIN — ROFLUMILAST 500 MCG: 500 TABLET ORAL at 08:33

## 2024-12-06 RX ADMIN — BUDESONIDE 500 MCG: 0.5 SUSPENSION RESPIRATORY (INHALATION) at 07:23

## 2024-12-06 RX ADMIN — ALBUTEROL SULFATE 2.5 MG: 2.5 SOLUTION RESPIRATORY (INHALATION) at 07:22

## 2024-12-06 RX ADMIN — ALBUTEROL SULFATE 2.5 MG: 2.5 SOLUTION RESPIRATORY (INHALATION) at 11:21

## 2024-12-06 ASSESSMENT — PAIN - FUNCTIONAL ASSESSMENT: PAIN_FUNCTIONAL_ASSESSMENT: PREVENTS OR INTERFERES SOME ACTIVE ACTIVITIES AND ADLS

## 2024-12-06 ASSESSMENT — PAIN DESCRIPTION - LOCATION
LOCATION: BACK
LOCATION: BACK
LOCATION: HEAD
LOCATION: BACK

## 2024-12-06 ASSESSMENT — PAIN SCALES - GENERAL
PAINLEVEL_OUTOF10: 0
PAINLEVEL_OUTOF10: 3
PAINLEVEL_OUTOF10: 6
PAINLEVEL_OUTOF10: 0
PAINLEVEL_OUTOF10: 3
PAINLEVEL_OUTOF10: 0

## 2024-12-06 ASSESSMENT — PAIN SCALES - WONG BAKER
WONGBAKER_NUMERICALRESPONSE: NO HURT
WONGBAKER_NUMERICALRESPONSE: NO HURT

## 2024-12-06 ASSESSMENT — PAIN DESCRIPTION - DESCRIPTORS
DESCRIPTORS: ACHING;DISCOMFORT
DESCRIPTORS: ACHING;DISCOMFORT
DESCRIPTORS: DISCOMFORT;ACHING
DESCRIPTORS: DISCOMFORT

## 2024-12-06 ASSESSMENT — PAIN DESCRIPTION - ORIENTATION
ORIENTATION: MID
ORIENTATION: LOWER

## 2024-12-06 NOTE — PROGRESS NOTES
Physical Therapy  Facility/Department: [facility] B3 - MED SURG  Daily Treatment Note  NAME: Terri Smith  : 1940  MRN: [id number]    Date of Service: 2024    Discharge Recommendations:  Subacute/Skilled Nursing Facility   PT Equipment Recommendations  Equipment Needed: No  Other: defer    Therapy discharge recommendations are subject to collaboration from the patient’s interdisciplinary healthcare team, including MD and case management recommendations.    Barriers to Home Discharge:   [x] Steps to access home entry or bed/bath:   [x] Unable to transfer, ambulate, or propel wheelchair household distances without assist   [x] Limited available assist at home upon discharge    [] Patient or family requests d/c to post-acute facility    [] Poor cognition/safety awareness for d/c to home alone    [] Unable to maintain ordered weight bearing status    [] Patient with salient signs of long-standing immobility   [] Decreased independence with ADLs   [x] Increased risk for falls   [] Other:    If pt is unable to be seen after this session, please let this note serve as discharge summary.  Please see case management note for discharge disposition.  Thank you.  Patient Diagnosis(es): The primary encounter diagnosis was Dizziness. Diagnoses of Ambulatory dysfunction, Chronic obstructive pulmonary disease, unspecified COPD type (HCC), Hypomagnesemia, and Chronic kidney disease, unspecified CKD stage were also pertinent to this visit.    Assessment  Assessment: Pt seen as cotx with OT to progress functional mobility safely and maximize outcomes compared to 1:1 session. Pt currently requires SBA for bed mobility, min(A)x2 for transfers with SW. Pt overall limited by poor standing tolerance and hypotension, although appears stable this session. Pt would benefit from continued skilled PT to address remaining deficits. Recommend SNF upon d/c    Activity Tolerance: Patient limited by endurance;Patient limited by  fatigue  Equipment Needed: No  Other: defer    Plan  Physical Therapy Plan  General Plan: 3-5 times per week  Current Treatment Recommendations: Strengthening;ROM;Balance training;Functional mobility training;Transfer training;ADL/Self-care training;IADL training;Endurance training;Gait training;Stair training;Neuromuscular re-education;Home exercise program;Safety education & training;Patient/Caregiver education & training;Equipment evaluation, education, & procurement;Positioning;Therapeutic activities    Restrictions  Restrictions/Precautions  Restrictions/Precautions: Fall Risk, Contact Precautions  Position Activity Restriction  Other Position/Activity Restrictions: up with assist, O2, purewick     Subjective   Orientation  Overall Orientation Status: Within Normal Limits  Orientation Level: Oriented X4  Cognition  Overall Cognitive Status: Exceptions (increased anxiety with people move around her, per pt)    Objective  Vitals  Vitals:    12/06/24 1351   BP: (!) 109/58   Pulse: 84   Resp:    Temp:    SpO2:      Bed Mobility Training  Bed Mobility Training: Yes  Interventions: Verbal cues;Safety awareness training  Supine to Sit: Stand-by assistance  Balance  Sitting: Intact  Standing: Impaired  Standing - Static: Fair;Poor  Standing - Dynamic: Fair;Poor  Transfer Training  Transfer Training: Yes  Interventions: Verbal cues;Safety awareness training  Sit to Stand: Minimum assistance;Additional time;Assist X2 (poor safety awareness, declined gait belt)  Stand to Sit: Minimum assistance;Additional time;Assist X2  Stand Pivot Transfers: Minimum assistance;Assist X2 (with SW)  Gait  Gait Training: No (pt declines)     Therapeutic Exercise  Pt performed the following exercises in Seated x10 reps each. Rest breaks between each set as needed. Pt required verbal/Tactile cues for technique, proper pacing  [x] Ankle Pumps  [] Heel Slides  [] Hip Abduction  [] Hip Adduction with pillow  [x] Long Arc Quad  [] Short Arc

## 2024-12-06 NOTE — DISCHARGE SUMMARY
Hospital Medicine Discharge Summary    Patient: Terri Smith   : 1940     Admit Date: 2024   Discharge Date:   24  Disposition:  []Home   []HHC  [x]SNF  []Acute Rehab  []LTAC  []Hospice  Code status:  [x]Full  []DNR/CCA  []Limited (DNR/CCA with Do Not Intubate)  []DNRCC  Condition at Discharge: Stable  Primary Care Provider: Jackson Ontiveros MD    Admitting Provider: José Manuel Nazario MD  Discharge Provider: Martina Kumar MD     Discharge Diagnoses:      Active Hospital Problems    Diagnosis     Vertigo [R42]        Presenting Admission History:      84 y.o. female  with PMHx significant for  COPD/on home oxygen 2 liters, HFpEF, CKD, paroxysmal A-fib/on long-term anticoagulation with Eliquis, hypertension, hyperlipidemia, anxiety/depression  has been on long term BZO.  She presented to [facility] ED with complaint of increased weakness, dizziness and difficulty walking.     Of pertinence is she had a recent admission to [facility] from 2024 to 2024.   She presented that admission with chest congestion, cough and shortness of breath.  She states she was doing well until the past 2 days when she developed increased dizziness, difficulty with walking.  Her daughter who is at the bedside also states she has not been eating or drinking well for the past 2 days.  No complaint of chest pain, no shortness of breath, no fever or chills.  She is now being admitted for further evaluation and management.     Assessment/Plan:      Vertigo, improved  Hypotension, improved  During admission:  Encourage hydration PO  IVF as needed  Antiemetics PRN in place  Meclizine as needed  Continue to monitor  Orthostatic vital signs  1L LR bolus given, blood pressures stable  Midodrine PRN in place 5mg BID    #Melena, resolved  #Oropharyngeal dysphagia  During admission:  Gastroenterology consulted, appreciate recommendations  EGD completed, unremarkable  PPI IV BID in place transitioned to  intracranial abnormality.     XR CHEST PORTABLE    Result Date: 12/1/2024  EXAMINATION: ONE XRAY VIEW OF THE CHEST 12/1/2024 8:42 am COMPARISON: 11/23/2024 HISTORY: ORDERING SYSTEM PROVIDED HISTORY: weakness TECHNOLOGIST PROVIDED HISTORY: Reason for exam:->weakness Reason for Exam: weakness and SOB FINDINGS: The lungs are hyperexpanded without focal consolidation.  Heart size and vascularity are stable.  There is no pneumothorax or effusion.     COPD without acute abnormality.     XR CHEST (2 VW)    Result Date: 11/23/2024  EXAMINATION: TWO XRAY VIEWS OF THE CHEST 11/23/2024 5:16 pm COMPARISON: 11/21/2024 HISTORY: ORDERING SYSTEM PROVIDED HISTORY: CHF TECHNOLOGIST PROVIDED HISTORY: Reason for exam:->CHF FINDINGS: Heart size is normal  Aorta is normal.  Lungs are normally expanded and clear. No pleural effusions. Mild spondylosis     Lungs are clear     XR CHEST PORTABLE    Result Date: 11/21/2024  EXAMINATION: ONE XRAY VIEW OF THE CHEST 11/21/2024 2:24 pm COMPARISON: 10/11/2024 HISTORY: ORDERING SYSTEM PROVIDED HISTORY: SOB TECHNOLOGIST PROVIDED HISTORY: Reason for exam:->SOB FINDINGS: The lungs are without acute focal process.  There is no effusion or pneumothorax. The cardiomediastinal silhouette is stable. The osseous structures are stable.     No acute process.       Consults:     IP CONSULT TO HOSPITALIST  IP CONSULT TO NEPHROLOGY  IP CONSULT TO GI  IP CONSULT TO HOME CARE NEEDS  IP CONSULT TO VASCULAR ACCESS TEAM    Labs:     Recent Labs     12/04/24  0606 12/05/24  0600 12/06/24  0611   WBC 8.0 8.2 8.0   HGB 9.2* 9.5* 9.1*   HCT 28.5* 30.2* 28.9*    255 247     Recent Labs     12/04/24  0606 12/05/24  0600 12/06/24  0611    142 143   K 4.2 3.9 4.1   CL 99 100 103   CO2 33* 34* 31   BUN 25* 19 16   CREATININE 1.4* 1.1 1.1   CALCIUM 9.5 10.1 9.8   MG 2.21 2.17 2.13   PHOS 2.9 2.0* 3.6     No results for input(s): \"PROBNP\", \"TROPHS\" in the last 72 hours.  No results for input(s): \"LABA1C\" in the  last 72 hours.  No results for input(s): \"AST\", \"ALT\", \"BILIDIR\", \"BILITOT\", \"ALKPHOS\" in the last 72 hours.  No results for input(s): \"INR\", \"LACTA\", \"TSH\" in the last 72 hours.    Urine Cultures:   Lab Results   Component Value Date/Time    LABURIN  08/30/2023 02:09 PM     <10,000 CFU/ml mixed skin/urogenital milad. No further workup     Blood Cultures:   Lab Results   Component Value Date/Time    BC No Growth after 4 days of incubation. 12/01/2024 02:12 PM     Lab Results   Component Value Date/Time    BLOODCULT2 No Growth after 4 days of incubation. 12/01/2024 02:12 PM     Organism:   Lab Results   Component Value Date/Time    ORG BHS Group B (Strep agalacticae) 07/05/2023 06:11 PM       Signed:    Martina Kumar MD

## 2024-12-06 NOTE — PROGRESS NOTES
Patient restless, wanted to get her cpap machine on following assessment and medications. Bed alarm on, bedside table at bedside, call light within reach, patient a/o x 4. No other needs at this time, will continue to assess overnight.

## 2024-12-06 NOTE — DISCHARGE INSTR - COC
Continuity of Care Form    Patient Name: Terri Smith   :  1940  MRN:  [id number]    Admit date:  2024  Discharge date:  24    Code Status Order: Full Code   Advance Directives:   Advance Care Flowsheet Documentation             Admitting Physician:  José Manuel Nazario MD  PCP: Jackson Ontiveros MD    Discharging Nurse: SUJIT Jones  Discharging Hospital Unit/Room#: 0358/0358-01  Discharging Unit Phone Number: [phone]    Emergency Contact:   Extended Emergency Contact Information  Primary Emergency Contact: Juan Smith   [facility]  Home Phone: [phone]  Relation: Child  Secondary Emergency Contact: Hafsa Tapia  Home Phone: [phone]  Mobile Phone: [phone]  Relation: Grandchild    Past Surgical History:  Past Surgical History:   Procedure Laterality Date    BRONCHOSCOPY  2019    Dr. Wheatley - w/BAL    CARDIAC CATHETERIZATION  2019    Dr. Palomares     SECTION      COLONOSCOPY N/A 2020    COLONOSCOPY DIAGNOSTIC performed by Rowdy Schmitz DO at [facility] ENDOSCOPY    COLONOSCOPY N/A 10/22/2021    COLONOSCOPY POLYPECTOMY SNARE/COLD BIOPSY [facility] END[facility]    COLONOSCOPY N/A 2023    COLONOSCOPY POLYPECTOMY SNARE performed by Darron Langford MD at [facility] ENDOSCOPY    CORONARY ARTERY BYPASS GRAFT N/A 2019    OFF PUMP CORONARY ARTERY BYPASS GRAFTING X2, INTERNAL MAMMARY ARTERY, SAPHENOUS VEIN GRAFT performed by Yolanda Chase MD at [facility] CVOR    IR MIDLINE CATH  2021    IR MIDLINE CATH 2021 [facility] SPECIAL PROCEDURES    MASTECTOMY, PARTIAL Left     SIGMOIDOSCOPY  2020    4 bands    SIGMOIDOSCOPY N/A 2020    FLEX SIG W/ BANDING SIGMOIDOSCOPY DIAGNOSTIC FLEXIBLE performed by Rowdy Schmitz DO at [facility] ENDOSCOPY    SIGMOIDOSCOPY N/A 2023    FLEX SIG. performed by Celestine Lester MD at [facility] ENDOSCOPY    UPPER GASTROINTESTINAL ENDOSCOPY N/A  respiratory abnormalities R06.00, R06.89    History of tobacco abuse Z87.891    Moderate malnutrition ([facility]) E44.0    NSTEMI (non-ST elevated myocardial infarction) ([facility]) I21.4    Acute respiratory failure with hypoxia J96.01    CAD (coronary artery disease) I25.10    Acute pulmonary edema J81.0    Pulmonary infiltrate R91.8    Elevated brain natriuretic peptide (BNP) level R79.89    Leukocytosis D72.829    Ischemic cardiomyopathy I25.5    Acute combined systolic and diastolic congestive heart failure ([facility]) I50.41    Hypernatremia E87.0    NADJA (acute kidney injury) ([facility]) N17.9    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    Mucus plugging of bronchi T17.500A    CAD in native artery I25.10    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    Pneumonia of both lower lobes due to methicillin resistant Staphylococcus aureus (MRSA) ([facility]) J15.212    GI bleed K92.2    Severe malnutrition ([facility]) E43    Chest pain R07.9    Chronic respiratory failure with hypoxia J96.11    Primary hypertension I10    Anemia D64.9    Acute on chronic hypoxic respiratory failure J96.21    Acute respiratory distress R06.03    Volume overload E87.70    Demand ischemia ([facility]) I24.89    GERD (gastroesophageal reflux disease) K21.9    Acute respiratory failure with hypoxia and hypercapnia J96.01, J96.02    Shock R57.9    Pneumonia due to infectious organism J18.9    Acute on chronic respiratory failure with hypoxia and hypercapnia J96.21, J96.22    Chronic obstructive pulmonary disease ([facility]) J44.9    Acute on chronic respiratory failure with hypoxemia J96.21    Chronic anxiety F41.9    Acute decompensated heart failure ([facility]) I50.9    Acute respiratory failure with hypoxia and hypercarbia J96.01, J96.02    Acute kidney injury ([facility]) N17.9    Elevated procalcitonin R79.89    COPD, severe ([facility]) J44.9    Anxiety F41.9    Severe anxiety F41.9    Severe protein-calorie malnutrition ([facility]) E43    HCAP (healthcare-associated pneumonia) J18.9    Pleural effusion J90     Speech/Language Therapy  Weight Bearing Status/Restrictions: No weight bearing restrictions  Other Medical Equipment (for information only, NOT a DME order):  walker  Other Treatments: na    Patient's personal belongings (please select all that are sent with patient):  Clothing, family sent home with other belongings    RN SIGNATURE:  Electronically signed by Phoebe Peralta RN on 12/6/24 at 5:11 PM EST    CASE MANAGEMENT/SOCIAL WORK SECTION    Inpatient Status Date: ***    Readmission Risk Assessment Score:  Readmission Risk              Risk of Unplanned Readmission:  47           Discharging to Facility/ Agency   Name:   Address:  Phone:  Fax:    Dialysis Facility (if applicable)   Name:  Address:  Dialysis Schedule:  Phone:  Fax:    / signature: {Esignature:[id number]}    PHYSICIAN SECTION    Prognosis: Good    Condition at Discharge: Stable    Rehab Potential (if transferring to Rehab): Good    Recommended Labs or Other Treatments After Discharge: Oxygenation, kidney function, weakness, vital signs    Physician Certification: I certify the above information and transfer of Terri Smith  is necessary for the continuing treatment of the diagnosis listed and that she requires Home Care for greater 30 days.     Update Admission H&P: No change in H&P    PHYSICIAN SIGNATURE:  Electronically signed by Martina Kumar MD on 12/6/24 at 10:22 AM EST

## 2024-12-06 NOTE — PROGRESS NOTES
Occupational Therapy  Facility/Department: [facility] B3 - MED SURG  Daily Treatment Note  NAME: Terri Smith  : 1940  MRN: [id number]    Date of Service: 2024    Discharge Recommendations:  Subacute/Skilled Nursing Facility  OT Equipment Recommendations  Equipment Needed: No  Other: defer    Therapy discharge recommendations are subject to collaboration from the patient’s interdisciplinary healthcare team, including MD and case management recommendations.     Barriers to Home Discharge:   [x] Steps to access home entry or bed/bath:   [x] Unable to transfer, ambulate, or propel wheelchair household distances without assist   [x] Limited available assist at home upon discharge    [] Patient or family requests d/c to post-acute facility    [] Poor cognition/safety awareness for d/c to home alone    [] Unable to maintain ordered weight bearing status    [] Patient with salient signs of long-standing immobility   [] Decreased independence with ADLs   [x] Increased risk for falls   [] Other:     If pt is unable to be seen after this session, please let this note serve as discharge summary.  Please see case management note for discharge disposition.  Thank you.    Patient Diagnosis(es): The primary encounter diagnosis was Dizziness. Diagnoses of Ambulatory dysfunction, Chronic obstructive pulmonary disease, unspecified COPD type (HCC), Hypomagnesemia, and Chronic kidney disease, unspecified CKD stage were also pertinent to this visit.     Assessment   Assessment: Pt supine in bed at start of session. Pt tolerates OT session fair. Pt completes bed mobility with SBA and bed to chair transfer with minAx2 and SW. Pt with poor safety awareness, adamantly declining safety belt and verbalizing frustration with therapist during line management. Pt requires increased time for all activity d/t anxiousness and feeling dizzy. Pt is limited by weakness - OT recommends SNF to increase pt's independence with ADLs/ mobility.  Minimum assistance;Additional time;Assist X2  Stand Pivot Transfers: Minimum assistance;Assist X2 (with SW)  Gait  Gait Training: No (pt declines)     ADL  Putting On/Taking Off Footwear: Dependent/Total  Putting On/Taking Off Footwear Skilled Clinical Factors: donned slippers supine in bed  Toileting: Dependent/Total  Toileting Skilled Clinical Factors: purewick  Functional Mobility: Moderate assistance;Increased time to complete;Adaptive equipment  Functional Mobility Skilled Clinical Factors: minAx2 bed to chair with SW  Product Used : Bath wipes        Safety Devices  Type of Devices: Call light within reach;Gait belt;Nurse notified;Left in chair;Chair alarm in place  Restraints  Restraints Initially in Place: No    Patient Education  Education Given To: Patient;Family  Education Provided: Role of Therapy;Plan of Care;Home Exercise Program;ADL Adaptive Strategies;Transfer Training;Fall Prevention Strategies;Energy Conservation  Education Provided Comments: Pt educated on importance of OOB mobility, prevention of complications of bedrest, and general safety during hospitalization.  Education Method: Demonstration;Verbal  Barriers to Learning: Cognition  Education Outcome: Continued education needed;Unable to verbalize    Goals  Short Term Goals  Time Frame for Short Term Goals: 1 week by 12/09/24 unless otherwise stated-- goals ongoing 12/4/24  Short Term Goal 1: Perform functional transfer with supervision  Short Term Goal 2: Perform toilet transfer with supervision  Short Term Goal 3: Perform LB dressing (pants/ brief) with supervision  Short Term Goal 4: Perform standing grooming task with supervision  Short Term Goal 5: Perform x15 reps x2 sets BUE exercises in prep for ADLs and transfers by 12/08/24  Patient Goals   Patient goals : to go home    AM-PAC - ADL  AM-PAC Daily Activity - Inpatient   How much help is needed for putting on and taking off regular lower body clothing?: Total  How much help is needed

## 2024-12-06 NOTE — CARE COORDINATION
Chart reviewed day 5. Care provided by nephro, GI and IM. Pt is from home with her son and DIL. She has [facility] and [employer] for home O2. Pt is now agreeable to SNF . Referral to [facility]. Will continue to follow course for needs. Ashly Wilson RN

## 2024-12-06 NOTE — PROGRESS NOTES
PROGRESS NOTE    HPI: Terri Smith is a(n)84 y.o. female admitted for work-up and treatment for Hypomagnesemia [E83.42]  Dizziness [R42]  Vertigo [R42]  Ambulatory dysfunction [R26.2]  Chronic obstructive pulmonary disease, unspecified COPD type (HCC) [J44.9]  Chronic kidney disease, unspecified CKD stage [N18.9].     We are following for vomiting, melena.    Subjective:     No further melena reported.  She has had brown BMs.  Denies dysphagia today.  Cleared for regular diet by speech.      Objective:     I/O last 3 completed shifts:  In: 200 [P.O.:200]  Out: 300 [Urine:300]      BP (!) 109/58   Pulse 84   Temp 97.5 °F (36.4 °C) (Oral)   Resp 16   Ht 1.626 m (5' 4.02\")   Wt 45.5 kg (100 lb 5 oz)   SpO2 99%   BMI 17.21 kg/m²     Physical Exam:  HEENT: anicteric sclera, oropharyngeal membranes pink and moist.  Cor: RRR  Lungs: non-labored, no respiratory distress  Abdomen: soft,  NT. No ascites.  No hepatomegaly or splenomegaly  Neuro: alert and oriented x 3      Results:   Lab Results   Component Value Date    ALT 6 (L) 12/01/2024    AST 14 (L) 12/01/2024    ALKPHOS 67 12/01/2024    BILIDIR <0.1 11/22/2024    INR 1.21 (H) 12/01/2024    LIPASE 122.0 (H) 07/21/2024     Lab Results   Component Value Date    WBC 8.0 12/06/2024    HGB 9.1 (L) 12/06/2024    HCT 28.9 (L) 12/06/2024    MCV 96.9 12/06/2024     12/06/2024     BUN/Cr/glu/ALT/AST/amyl/lip:  16/1.1/--/--/--/--/-- (12/06 0611)  CT Head W/O Contrast    Result Date: 12/1/2024  No acute intracranial abnormality.     XR CHEST PORTABLE    Result Date: 12/1/2024  COPD without acute abnormality.         Impression:  84-year-old female with past medical history of HTN, COPD, chronic O2 dependence, vertigo, CAD s/p CABG 2018, CHF, CKD, DM2, A-fib on Eliquis, dysphagia, anxiety, depression and GERD who presented to the ER with chest congestion, cough and shortness of breath.    GI consulted for  vomiting and melena.     Vomiting.   She is regurgitating rather than vomiting.  No hematemesis.  ? Oropharyngeal dysphagia or esophageal dysmotility.  She does have a history of esophageal stricture, but she underwent dilation less than 3 months ago with 60 Fr vasquez dilator.  EGD yesterday unremarkable.  Cleared for regular diet by speech therapy.     2. Melena.  Hgb stable.  EGD yesterday negative.  Possible black stools were secondary to iron as they are now brown since holding, and drop in Hgb was dilutional.     3. NADJA. Nephrology following.     4. Vertigo. Per primary.     5. COPD.        Plan:  Can restart AC.  Continue empiric PPI daily.  3. Monitor for recurrent evidence of GI bleeding.  4. Consider repeat barium esophagram, or outpatient manometry testing.  GI will sign off. We will have her follow-up in office.        Please do not hesitate to call with questions or concerns.      Electronically signed by: CLFITON Melchor 12/6/2024 2:26 PM     (Office) [phone]  (Fax) [phone]  Available via perfect serve

## 2024-12-06 NOTE — CARE COORDINATION
CASE MANAGEMENT DISCHARGE SUMMARY      Discharge to: [facility]    Precertification completed: N/A  Hospital Exemption Notification (HENS) completed: [id number]     IMM given: 12/6/2024    Transportation:       Medical Transport explained to pt/family. Pt/family voice no agency preference.    Agency used: [facility]   time: 1745   Ambulance form completed: Yes    Confirmed discharge plan with:     Patient: yes     Family:  yes, daughter at bedside     Facility/Agency, name:  [facility] faxed   Phone number for report to facility: [phone]     RN, name: Karen    Note: Discharging nurse to complete [facility], reconcile [facility], and place final copy with patient's discharge packet. RN to ensure that written prescriptions for  Level II medications are sent with patient to the facility as per protocol.  Ashly Wilson RN

## 2024-12-06 NOTE — PROGRESS NOTES
[facility] Medicine Progress Note  V 10.25      Date of Admission: 12/1/2024    Hospital Day: 6      Chief Admission Complaint:  Weakness, dizziness    Subjective:  Patient seen at bedside this morning. Patient denies any pain anywhere, dizziness at this time, fevers, chills, diarrhea or constipation, nausea or vomiting. Patient planned for discharge.    Presenting Admission History:       84 y.o. female  with PMHx significant for  COPD/on home oxygen 2 liters, HFpEF, CKD, paroxysmal A-fib/on long-term anticoagulation with Eliquis, hypertension, hyperlipidemia, anxiety/depression  has been on long term BZO.  She presented to [facility] ED with complaint of increased weakness, dizziness and difficulty walking.     Of pertinence is she had a recent admission to [facility] from 11/21/2024 to 11/25/2024.   She presented that admission with chest congestion, cough and shortness of breath.  She states she was doing well until the past 2 days when she developed increased dizziness, difficulty with walking.  Her daughter who is at the bedside also states she has not been eating or drinking well for the past 2 days.  No complaint of chest pain, no shortness of breath, no fever or chills.  She is now being admitted for further evaluation and management.    Assessment/Plan:      Current Principal Problem:  Vertigo    Vertigo, improved  Plan:  Encourage hydration PO  IVF as needed  Antiemetics PRN in place  Meclizine as needed  Continue to monitor  Orthostatic vital signs    #Melena, resolved  #Oropharyngeal dysphagia  Plan:  Gastroenterology consulted, appreciate recommendations  EGD completed, unremarkable  PPI IV BID in place  Continue to monitor H&H    NADJA on CKD, improved  Plan:  Encourage hydration PO  IVF in place as needed  Avoid nephrotoxic medications    COPD  She is normally at home oxygen 2 L continuous  Continue bronchodilators     HFpEF   History is noted.  Her last echocardiogram was in 6/2024.  It did  IV diuresis    [] Hypertonic Saline    [] Critical electrolyte abnormalities requiring IV replacement    [] Insulin - Scheduled/SSI or Insulin gtt    [] Anticoagulation (Heparin gtt or Coumadin - other anticoagulants in special circumstances)    [] Cardiac Medications (IV Amiodarone/Diltiazem, Tikosyn, etc)    [] Hemodialysis    [] Other -    [] Change in code status    [] Decision to escalate care    [] Major surgery/procedure with associated risk factors    --------------------------------------------------  C. Data (any 2)    [x] Data Review (any 3)    [x] Consultant notes from yesterday/today    [x] All available current labs reviewed interpreted for clinical significance    [x] Appropriate follow-up labs were ordered  [] Collateral history obtained     [] Independent Interpretation of tests (any 1)    [] Telemetry (Rhythm Strip) personally reviewed and interpreted        [] Imaging personally reviewed and interpreted     [x] Discussion (any 1)  [x] Multi-Disciplinary Rounds with Case Management  [] Discussed management of the case with           Labs:  Personally reviewed on 12/6/2024 and interpreted for clinical significance as documented above.     Recent Labs     12/04/24  0606 12/05/24  0600 12/06/24  0611   WBC 8.0 8.2 8.0   HGB 9.2* 9.5* 9.1*   HCT 28.5* 30.2* 28.9*    255 247     Recent Labs     12/04/24  0606 12/05/24  0600 12/06/24  0611    142 143   K 4.2 3.9 4.1   CL 99 100 103   CO2 33* 34* 31   BUN 25* 19 16   CREATININE 1.4* 1.1 1.1   CALCIUM 9.5 10.1 9.8   MG 2.21 2.17 2.13   PHOS 2.9 2.0* 3.6     No results for input(s): \"PROBNP\", \"TROPHS\" in the last 72 hours.    No results for input(s): \"LABA1C\" in the last 72 hours.  No results for input(s): \"AST\", \"ALT\", \"BILIDIR\", \"BILITOT\", \"ALKPHOS\" in the last 72 hours.    No results for input(s): \"INR\", \"LACTA\", \"TSH\" in the last 72 hours.      Urine Cultures:   Lab Results   Component Value Date/Time    LABURIN  08/30/2023 02:09 PM

## 2024-12-06 NOTE — PLAN OF CARE
Problem: Chronic Conditions and Co-morbidities  Goal: Patient's chronic conditions and co-morbidity symptoms are monitored and maintained or improved  12/6/2024 0324 by Gill Us, RN  Outcome: Progressing  Flowsheets (Taken 12/5/2024 2000)  Care Plan - Patient's Chronic Conditions and Co-Morbidity Symptoms are Monitored and Maintained or Improved: Monitor and assess patient's chronic conditions and comorbid symptoms for stability, deterioration, or improvement     Problem: Discharge Planning  Goal: Discharge to home or other facility with appropriate resources  Outcome: Progressing  Flowsheets (Taken 12/5/2024 2000)  Discharge to home or other facility with appropriate resources: Identify barriers to discharge with patient and caregiver     Problem: Pain  Goal: Verbalizes/displays adequate comfort level or baseline comfort level  Outcome: Progressing  Flowsheets (Taken 12/5/2024 1945)  Verbalizes/displays adequate comfort level or baseline comfort level: Encourage patient to monitor pain and request assistance     Problem: Safety - Adult  Goal: Free from fall injury  12/6/2024 0324 by Gill Us, RN  Outcome: Progressing     Problem: Skin/Tissue Integrity  Goal: Absence of new skin breakdown  Outcome: Progressing

## 2024-12-06 NOTE — PROGRESS NOTES
Speech Language Pathology  Dysphagia Treatment/Follow-Up Note/Re-eval  Facility/Department: [facility] B3 - MED SURG    Recommendations:  Solid Consistency: IDDSI 7 Regular- Easy To Chew  Liquid Consistency: IDDSI 0 Thin Liquids - straws OK  Medication: Meds PO as tolerated  *Instrumental assessment is not indicated at this time- SLP to continue to monitor  Risk Management: upright for all intake, stay upright for at least 30 mins after intake, small bites/sips, close supervision, oral care 2-3x/day to reduce adverse affects in the event of aspiration, increase physical mobility as able, alternate bites/sips, slow rate of intake, general GERD precautions, general aspiration precautions, and hold PO and contact SLP if s/s of aspiration or worsening respiratory status develop.  *Recommend ongoing GI intervention as indicated     NAME:Terri Smith  : 1940 (84 y.o.)   MRN: [id number]  ROOM: [address] [place] [address]  ADMISSION DATE: 2024  PATIENT DIAGNOSIS(ES): Hypomagnesemia [E83.42]  Dizziness [R42]  Vertigo [R42]  Ambulatory dysfunction [R26.2]  Chronic obstructive pulmonary disease, unspecified COPD type (HCC) [J44.9]  Chronic kidney disease, unspecified CKD stage [N18.9]  Allergies   Allergen Reactions    Latex Other (See Comments)     Burning    Other reaction(s): Dermatitis, Other (See Comments)  Burning  Burning      Codeine Other (See Comments)     \"hallucinations\"  Other reaction(s): Other (See Comments)  \"hallucinations\"  Hallucinations    Hallucinations         DATE ONSET: 2024    Pain: The patient does not complain of pain       Current Diet: ADULT DIET; Easy to Chew; Low Fat/Low Chol/High Fiber/CHRISTIANNE      Diet Tolerance:  Patient tolerating current diet level without signs/symptoms of aspiration; however, has continued to demonstrate vomiting with PO    Dysphagia Treatment and Impressions:  Subjective: Pt seen in room at bedside with RN permission   Behavior / Cognition: Lethargic but cooperative and  agreeable to tx  RN Report/Chart Review: GI consulted 12/4/24: \"84-year-old female with past medical history of HTN, COPD, chronic O2 dependence, vertigo, CAD s/p CABG 2018, CHF, CKD, DM2, A-fib on Eliquis, dysphagia, anxiety, depression and GERD who presented to the ER with chest congestion, cough and shortness of breath.    GI consulted for vomiting and melena.  Vomiting.   She is regurgitating rather than vomiting.  No hematemesis.  ? Oropharyngeal dysphagia or esophageal dysmotility.  She does have a history of esophageal stricture, but she underwent dilation less than 3 months ago with 60 Fr vasquez dilator.  2. Melena.  Hgb has dropped today.  Of note she has been on IVF.  Vitals signs are stable.  3. NADJA. Nephrology following.  4. Vertigo. Per primary.  5. COPD    Plan:  1. EGD tomorrow in evaluation of melena.  Hold oral iron.  Recommend holding AC, will discuss with hospitalist.  2. IV PPI bid.  3. Monitor for overt bleeding.  4. Trend H&H.  5. Speech therapy consult. If no aspiration, and EGD is without cause of her dysphagia, recommend barium esophagram. She may ultimately need esophageal manometry.\"  Per RN, EGD completed 12/5 was \"negative\" and dilation was not completed. Plans for esophogram this date.   Re-eval order placed 12/5 with SLP f/u with pt at bedside as pt already on caseload  Patient tolerance: Pt continues with vomiting with PO intake, re-eval order placed to determine if oropharyngeal dysphagia potentially contributing     Baseline Respiratory Status Measures: Pt with SPO2% of 94 on 3 LPM NC with RR of 18    Liquid PO Trials:    IDDSI 0 Thin:  Assessed via straw: no anterior bolus loss , swallow timing subjectively appears timely, no clinical s/s of aspiration, and vitals stable    Solid PO Trials  IDDSI 7 Regular: easy to chew  no anterior bolus loss , swallow timing subjectively appears timely, prolonged mastication, oral clearance grossly WFL, no clinical s/s of aspiration, and vitals

## 2024-12-06 NOTE — PROGRESS NOTES
[facility] Progress Note           Subjective/   84 y.o. year old female who we are seeing in consultation for NADJA.     HPI:  Renal function better with IV fluids, BP's low, getting additional fluids today.  Shortness of breath improving, remains on 3 L NC (uses 2 L NC at home).    ROS:  +weak, intake reduced.    Objective/   GEN:  Chronically ill, BP (!) 79/41   Pulse 73   Temp 97.5 °F (36.4 °C) (Oral)   Resp 16   Ht 1.626 m (5' 4.02\")   Wt 45.5 kg (100 lb 5 oz)   SpO2 97%   BMI 17.21 kg/m²   HEENT: non-icteric, no JVD  CV: S1, S2 without m/r/g; no LE edema  RESP: CTA B without w/r/r; breathing wnl  ABD: +bs, soft, nt, no hsm  SKIN: warm, no rashes    Data/  Recent Labs     12/04/24  0606 12/05/24  0600 12/06/24  0611   WBC 8.0 8.2 8.0   HGB 9.2* 9.5* 9.1*   HCT 28.5* 30.2* 28.9*   MCV 95.3 95.2 96.9    255 247     Recent Labs     12/04/24  0606 12/05/24  0600 12/06/24  0611    142 143   K 4.2 3.9 4.1   CL 99 100 103   CO2 33* 34* 31   GLUCOSE 107* 100* 103*   PHOS 2.9 2.0* 3.6   MG 2.21 2.17 2.13   BUN 25* 19 16   CREATININE 1.4* 1.1 1.1   LABGLOM 37* 50* 50*     UA bland  Urine sodium and chloride less than 20    Assessment/     - Acute kidney injury - suspected recurrent pre-renal state   SCr up to 2.0, better with fluids, back below baseline of 1.4-1.6     - Chronic kidney disease stage 3b     - Diastolic CHF     - COPD - on 2 L NC at home      Plan/     -  ml bolus today  - Holding torsemide, can resume cautiously at lower dose given recurrent tendency for volume depletion and NADJA  - Trend labs, bp's, weights, & urine output      ____________________________________  Dmitri Naik MD  [facility]  [email or web address]  Office: [phone]

## 2024-12-06 NOTE — PLAN OF CARE
Problem: Chronic Conditions and Co-morbidities  Goal: Patient's chronic conditions and co-morbidity symptoms are monitored and maintained or improved  12/6/2024 1603 by Phoebe Peralta RN  Outcome: Progressing  12/6/2024 0324 by Gill Us RN  Outcome: Progressing  Flowsheets (Taken 12/5/2024 2000)  Care Plan - Patient's Chronic Conditions and Co-Morbidity Symptoms are Monitored and Maintained or Improved: Monitor and assess patient's chronic conditions and comorbid symptoms for stability, deterioration, or improvement     Problem: Discharge Planning  Goal: Discharge to home or other facility with appropriate resources  12/6/2024 1603 by Phoebe Peralta RN  Outcome: Progressing  12/6/2024 0324 by Gill Us RN  Outcome: Progressing  Flowsheets (Taken 12/5/2024 2000)  Discharge to home or other facility with appropriate resources: Identify barriers to discharge with patient and caregiver     Problem: Pain  Goal: Verbalizes/displays adequate comfort level or baseline comfort level  12/6/2024 1603 by Phoebe Peralta RN  Outcome: Progressing  12/6/2024 0324 by Gill Us RN  Outcome: Progressing  Flowsheets (Taken 12/5/2024 1945)  Verbalizes/displays adequate comfort level or baseline comfort level: Encourage patient to monitor pain and request assistance     Problem: Safety - Adult  Goal: Free from fall injury  12/6/2024 1603 by Phoebe Peralta RN  Outcome: Progressing  12/6/2024 0324 by Gill Us RN  Outcome: Progressing     Problem: Skin/Tissue Integrity  Goal: Absence of new skin breakdown  Description: 1.  Monitor for areas of redness and/or skin breakdown  2.  Assess vascular access sites hourly  3.  Every 4-6 hours minimum:  Change oxygen saturation probe site  4.  Every 4-6 hours:  If on nasal continuous positive airway pressure, respiratory therapy assess nares and determine need for appliance change or resting period.  12/6/2024 1603 by Kathryn  SUJIT Sandhu  Outcome: Progressing  12/6/2024 0324 by Gill Us RN  Outcome: Progressing     Problem: Cardiovascular - Adult  Goal: Maintains optimal cardiac output and hemodynamic stability  Outcome: Progressing  Goal: Absence of cardiac dysrhythmias or at baseline  Outcome: Progressing     Problem: Musculoskeletal - Adult  Goal: Return mobility to safest level of function  Outcome: Progressing     Problem: Infection - Adult  Goal: Absence of infection at discharge  Outcome: Progressing     Problem: Skin/Tissue Integrity - Adult  Goal: Skin integrity remains intact  12/6/2024 1603 by Phoebe Peralta RN  Outcome: Progressing  12/6/2024 0324 by Gill Us RN  Outcome: Progressing  Flowsheets (Taken 12/5/2024 2000)  Skin Integrity Remains Intact: Monitor for areas of redness and/or skin breakdown     Problem: Nutrition Deficit:  Goal: Optimize nutritional status  Outcome: Progressing     Problem: Respiratory - Adult  Goal: Achieves optimal ventilation and oxygenation  Outcome: Progressing     Problem: Gastrointestinal - Adult  Goal: Minimal or absence of nausea and vomiting  Outcome: Progressing  Goal: Maintains adequate nutritional intake  Outcome: Progressing     Problem: Genitourinary - Adult  Goal: Absence of urinary retention  Outcome: Progressing     Problem: Anxiety  Goal: Will report anxiety at manageable levels  Description: INTERVENTIONS:  1. Administer medication as ordered  2. Teach and rehearse alternative coping skills  3. Provide emotional support with 1:1 interaction with staff  Outcome: Progressing     Problem: Coping  Goal: Pt/Family able to verbalize concerns and demonstrate effective coping strategies  Description: INTERVENTIONS:  1. Assist patient/family to identify coping skills, available support systems and cultural and spiritual values  2. Provide emotional support, including active listening and acknowledgement of concerns of patient and caregivers  3. Reduce

## 2024-12-06 NOTE — PROGRESS NOTES
1725: Attempted to call report to [facility], Spoke with Katie at , call was transferred to nurse's station and no answer.     1740: Attempted report second time to [facility], spoke with Katie again at . Transferred call to nurse's station and no answer.    1800: Attempted report third time to [facility], Spoke with Katie at  and she transferred call to nurse's station, and no answer. Message left with callback number to return call to SUJIT Jones for report.    1812: Report called to Delano at [place], notified that transport has not yet arrived

## 2024-12-07 NOTE — PROGRESS NOTES
Physician Progress Note      PATIENT:               SHARON HANEY  CSN #:                  [id number]  :                       1940  ADMIT DATE:       2024 8:15 AM  DISCH DATE:        2024 6:34 PM  RESPONDING  PROVIDER #:        Martina Kumar MD          QUERY TEXT:    Pt admitted with \"increased weakness, dizziness and difficulty walking/NADJA\".   If possible, please document in the progress notes and discharge summary if   you are evaluating and/or treating any of the following:    The medical record reflects the following:  Risk Factors: COPD/on home oxygen 2 liters, HFpEF, CKD, paroxysmal A-fib/on   long-term anticoagulation with Eliquis, hypertension, hyperlipidemia,   anxiety/depression  Clinical Indicators: PN- COPD She is normally at home oxygen 2 L continuous   Continue bronchodilators. Nephrology notes- Shortness of breath improving,   remains on 3 L NC (uses 2 L NC at home).  Treatment: Initiate oxygen therapy protocol, Continue bronchodilators    Thank-You, Ameena Salomon RN, BSN, CCDS  Options provided:  -- Chronic respiratory failure with hypoxia  -- Chronic respiratory failure with hypercapnia  -- Chronic respiratory failure with hypoxia and hypercapnia  -- Other - I will add my own diagnosis  -- Disagree - Not applicable / Not valid  -- Disagree - Clinically unable to determine / Unknown  -- Refer to Clinical Documentation Reviewer    PROVIDER RESPONSE TEXT:    This patient has chronic respiratory failure with hypoxia.    Query created by: Pau Salomon on 2024 1:03 PM      Electronically signed by:  Martina Kumar MD 2024 8:15 AM

## 2024-12-08 LAB — UUN UR-MCNC: 562 MG/DL (ref 800–1666)

## 2024-12-10 ENCOUNTER — TELEPHONE (OUTPATIENT)
Dept: CARDIOLOGY CLINIC | Age: 84
End: 2024-12-10

## 2024-12-10 NOTE — TELEPHONE ENCOUNTER
Ignacia with [facility] calling to schedule 2 week hosp F/U with [facility].  Looks like nothing available until 2 week in jan. Care facility concerned with time frame.  Is this okay? Can patient follow up with another provider?  Thank you

## 2024-12-11 ENCOUNTER — APPOINTMENT (OUTPATIENT)
Dept: GENERAL RADIOLOGY | Age: 84
End: 2024-12-11
Payer: MEDICARE

## 2024-12-11 ENCOUNTER — HOSPITAL ENCOUNTER (INPATIENT)
Age: 84
LOS: 10 days | Discharge: NURSING, REHAB OR LONG-TERM CARE | End: 2024-12-21
Attending: STUDENT IN AN ORGANIZED HEALTH CARE EDUCATION/TRAINING PROGRAM | Admitting: STUDENT IN AN ORGANIZED HEALTH CARE EDUCATION/TRAINING PROGRAM
Payer: MEDICARE

## 2024-12-11 DIAGNOSIS — A41.9 SEPTICEMIA (HCC): ICD-10-CM

## 2024-12-11 DIAGNOSIS — R06.02 SHORTNESS OF BREATH: ICD-10-CM

## 2024-12-11 DIAGNOSIS — R13.10 DYSPHAGIA, UNSPECIFIED TYPE: ICD-10-CM

## 2024-12-11 DIAGNOSIS — K92.1 MELENA: ICD-10-CM

## 2024-12-11 DIAGNOSIS — J18.9 PNEUMONIA DUE TO INFECTIOUS ORGANISM, UNSPECIFIED LATERALITY, UNSPECIFIED PART OF LUNG: Primary | ICD-10-CM

## 2024-12-11 LAB
ALBUMIN SERPL-MCNC: 3.5 G/DL (ref 3.4–5)
ALBUMIN/GLOB SERPL: 1 {RATIO} (ref 1.1–2.2)
ALP SERPL-CCNC: 89 U/L (ref 40–129)
ALT SERPL-CCNC: 8 U/L (ref 10–40)
ANION GAP SERPL CALCULATED.3IONS-SCNC: 11 MMOL/L (ref 3–16)
AST SERPL-CCNC: 18 U/L (ref 15–37)
BASE EXCESS BLDV CALC-SCNC: 2.3 MMOL/L (ref -3–3)
BASE EXCESS BLDV CALC-SCNC: 5.9 MMOL/L (ref -3–3)
BASOPHILS # BLD: 0.1 K/UL (ref 0–0.2)
BASOPHILS NFR BLD: 0.5 %
BILIRUB SERPL-MCNC: <0.2 MG/DL (ref 0–1)
BUN SERPL-MCNC: 18 MG/DL (ref 7–20)
CALCIUM SERPL-MCNC: 10.6 MG/DL (ref 8.3–10.6)
CHLORIDE SERPL-SCNC: 101 MMOL/L (ref 99–110)
CO2 BLDV-SCNC: 30 MMOL/L
CO2 BLDV-SCNC: 35 MMOL/L
CO2 SERPL-SCNC: 34 MMOL/L (ref 21–32)
COHGB MFR BLDV: 1.9 % (ref 0–1.5)
COHGB MFR BLDV: 6.7 % (ref 0–1.5)
CREAT SERPL-MCNC: 1.3 MG/DL (ref 0.6–1.2)
DEPRECATED RDW RBC AUTO: 14.8 % (ref 12.4–15.4)
EKG ATRIAL RATE: 135 BPM
EKG DIAGNOSIS: NORMAL
EKG P AXIS: 78 DEGREES
EKG P-R INTERVAL: 128 MS
EKG Q-T INTERVAL: 278 MS
EKG QRS DURATION: 74 MS
EKG QTC CALCULATION (BAZETT): 417 MS
EKG R AXIS: -67 DEGREES
EKG T AXIS: 95 DEGREES
EKG VENTRICULAR RATE: 135 BPM
EOSINOPHIL # BLD: 0.1 K/UL (ref 0–0.6)
EOSINOPHIL NFR BLD: 0.7 %
FLUAV RNA RESP QL NAA+PROBE: NOT DETECTED
FLUBV RNA RESP QL NAA+PROBE: NOT DETECTED
GFR SERPLBLD CREATININE-BSD FMLA CKD-EPI: 41 ML/MIN/{1.73_M2}
GLUCOSE SERPL-MCNC: 94 MG/DL (ref 70–99)
HCO3 BLDV-SCNC: 28.5 MMOL/L (ref 23–29)
HCO3 BLDV-SCNC: 32.4 MMOL/L (ref 23–29)
HCT VFR BLD AUTO: 31 % (ref 36–48)
HGB BLD-MCNC: 9.4 G/DL (ref 12–16)
LACTATE BLDV-SCNC: 0.7 MMOL/L (ref 0.4–2)
LIPASE SERPL-CCNC: 26 U/L (ref 13–60)
LYMPHOCYTES # BLD: 1.2 K/UL (ref 1–5.1)
LYMPHOCYTES NFR BLD: 7.7 %
MCH RBC QN AUTO: 29.4 PG (ref 26–34)
MCHC RBC AUTO-ENTMCNC: 30.3 G/DL (ref 31–36)
MCV RBC AUTO: 97.1 FL (ref 80–100)
METHGB MFR BLDV: 0.1 %
METHGB MFR BLDV: 0.2 %
MONOCYTES # BLD: 0.6 K/UL (ref 0–1.3)
MONOCYTES NFR BLD: 3.9 %
NEUTROPHILS # BLD: 13.2 K/UL (ref 1.7–7.7)
NEUTROPHILS NFR BLD: 87.2 %
NT-PROBNP SERPL-MCNC: 2778 PG/ML (ref 0–449)
O2 THERAPY: ABNORMAL
O2 THERAPY: ABNORMAL
PCO2 BLDV: 33.9 MMHG (ref 40–50)
PCO2 BLDV: 85.2 MMHG (ref 40–50)
PH BLDV: 7.2 [PH] (ref 7.35–7.45)
PH BLDV: 7.54 [PH] (ref 7.35–7.45)
PLATELET # BLD AUTO: 404 K/UL (ref 135–450)
PMV BLD AUTO: 7.2 FL (ref 5–10.5)
PO2 BLDV: 143.8 MMHG (ref 25–40)
PO2 BLDV: 48.4 MMHG (ref 25–40)
POTASSIUM SERPL-SCNC: 5.3 MMOL/L (ref 3.5–5.1)
PROCALCITONIN SERPL IA-MCNC: 0.53 NG/ML (ref 0–0.15)
PROT SERPL-MCNC: 7 G/DL (ref 6.4–8.2)
RBC # BLD AUTO: 3.19 M/UL (ref 4–5.2)
SAO2 % BLDV: 80 %
SAO2 % BLDV: 99 %
SARS-COV-2 RNA RESP QL NAA+PROBE: NOT DETECTED
SODIUM SERPL-SCNC: 146 MMOL/L (ref 136–145)
TROPONIN, HIGH SENSITIVITY: 73 NG/L (ref 0–14)
WBC # BLD AUTO: 15.1 K/UL (ref 4–11)

## 2024-12-11 PROCEDURE — 2700000000 HC OXYGEN THERAPY PER DAY

## 2024-12-11 PROCEDURE — 2500000003 HC RX 250 WO HCPCS: Performed by: INTERNAL MEDICINE

## 2024-12-11 PROCEDURE — 83690 ASSAY OF LIPASE: CPT

## 2024-12-11 PROCEDURE — 2580000003 HC RX 258: Performed by: NURSE PRACTITIONER

## 2024-12-11 PROCEDURE — 94640 AIRWAY INHALATION TREATMENT: CPT

## 2024-12-11 PROCEDURE — 71045 X-RAY EXAM CHEST 1 VIEW: CPT

## 2024-12-11 PROCEDURE — 2580000003 HC RX 258: Performed by: STUDENT IN AN ORGANIZED HEALTH CARE EDUCATION/TRAINING PROGRAM

## 2024-12-11 PROCEDURE — 80053 COMPREHEN METABOLIC PANEL: CPT

## 2024-12-11 PROCEDURE — 83605 ASSAY OF LACTIC ACID: CPT

## 2024-12-11 PROCEDURE — 94761 N-INVAS EAR/PLS OXIMETRY MLT: CPT

## 2024-12-11 PROCEDURE — 36415 COLL VENOUS BLD VENIPUNCTURE: CPT

## 2024-12-11 PROCEDURE — 99285 EMERGENCY DEPT VISIT HI MDM: CPT

## 2024-12-11 PROCEDURE — 82803 BLOOD GASES ANY COMBINATION: CPT

## 2024-12-11 PROCEDURE — 93005 ELECTROCARDIOGRAM TRACING: CPT | Performed by: STUDENT IN AN ORGANIZED HEALTH CARE EDUCATION/TRAINING PROGRAM

## 2024-12-11 PROCEDURE — 2060000000 HC ICU INTERMEDIATE R&B

## 2024-12-11 PROCEDURE — 93010 ELECTROCARDIOGRAM REPORT: CPT | Performed by: INTERNAL MEDICINE

## 2024-12-11 PROCEDURE — 93005 ELECTROCARDIOGRAM TRACING: CPT | Performed by: NURSE PRACTITIONER

## 2024-12-11 PROCEDURE — 6360000002 HC RX W HCPCS: Performed by: STUDENT IN AN ORGANIZED HEALTH CARE EDUCATION/TRAINING PROGRAM

## 2024-12-11 PROCEDURE — 87081 CULTURE SCREEN ONLY: CPT

## 2024-12-11 PROCEDURE — 6360000002 HC RX W HCPCS: Performed by: INTERNAL MEDICINE

## 2024-12-11 PROCEDURE — 85025 COMPLETE CBC W/AUTO DIFF WBC: CPT

## 2024-12-11 PROCEDURE — 87636 SARSCOV2 & INF A&B AMP PRB: CPT

## 2024-12-11 PROCEDURE — 84484 ASSAY OF TROPONIN QUANT: CPT

## 2024-12-11 PROCEDURE — 2580000003 HC RX 258: Performed by: INTERNAL MEDICINE

## 2024-12-11 PROCEDURE — 83880 ASSAY OF NATRIURETIC PEPTIDE: CPT

## 2024-12-11 PROCEDURE — 84145 PROCALCITONIN (PCT): CPT

## 2024-12-11 PROCEDURE — 96374 THER/PROPH/DIAG INJ IV PUSH: CPT

## 2024-12-11 PROCEDURE — 6360000002 HC RX W HCPCS: Performed by: NURSE PRACTITIONER

## 2024-12-11 PROCEDURE — 6370000000 HC RX 637 (ALT 250 FOR IP): Performed by: STUDENT IN AN ORGANIZED HEALTH CARE EDUCATION/TRAINING PROGRAM

## 2024-12-11 PROCEDURE — 96375 TX/PRO/DX INJ NEW DRUG ADDON: CPT

## 2024-12-11 PROCEDURE — 87040 BLOOD CULTURE FOR BACTERIA: CPT

## 2024-12-11 RX ORDER — ACETAMINOPHEN 500 MG
500 TABLET ORAL EVERY 6 HOURS PRN
Status: DISCONTINUED | OUTPATIENT
Start: 2024-12-11 | End: 2024-12-11 | Stop reason: SDUPTHER

## 2024-12-11 RX ORDER — FLUTICASONE PROPIONATE 50 MCG
1 SPRAY, SUSPENSION (ML) NASAL DAILY PRN
Status: DISCONTINUED | OUTPATIENT
Start: 2024-12-11 | End: 2024-12-21 | Stop reason: HOSPADM

## 2024-12-11 RX ORDER — DILTIAZEM HYDROCHLORIDE 5 MG/ML
5 INJECTION INTRAVENOUS ONCE
Status: COMPLETED | OUTPATIENT
Start: 2024-12-11 | End: 2024-12-11

## 2024-12-11 RX ORDER — SODIUM CHLORIDE 0.9 % (FLUSH) 0.9 %
5-40 SYRINGE (ML) INJECTION EVERY 12 HOURS SCHEDULED
Status: DISCONTINUED | OUTPATIENT
Start: 2024-12-11 | End: 2024-12-21 | Stop reason: HOSPADM

## 2024-12-11 RX ORDER — PANTOPRAZOLE SODIUM 40 MG/1
40 TABLET, DELAYED RELEASE ORAL
Status: DISCONTINUED | OUTPATIENT
Start: 2024-12-12 | End: 2024-12-14

## 2024-12-11 RX ORDER — BUDESONIDE 0.5 MG/2ML
0.5 INHALANT ORAL
Status: DISCONTINUED | OUTPATIENT
Start: 2024-12-11 | End: 2024-12-21 | Stop reason: HOSPADM

## 2024-12-11 RX ORDER — FUROSEMIDE 20 MG/1
20 TABLET ORAL DAILY
Status: DISCONTINUED | OUTPATIENT
Start: 2024-12-11 | End: 2024-12-13

## 2024-12-11 RX ORDER — POLYETHYLENE GLYCOL 3350 17 G/17G
17 POWDER, FOR SOLUTION ORAL DAILY PRN
Status: DISCONTINUED | OUTPATIENT
Start: 2024-12-11 | End: 2024-12-21 | Stop reason: HOSPADM

## 2024-12-11 RX ORDER — ROFLUMILAST 500 UG/1
500 TABLET ORAL DAILY
Status: DISCONTINUED | OUTPATIENT
Start: 2024-12-11 | End: 2024-12-21 | Stop reason: HOSPADM

## 2024-12-11 RX ORDER — AZITHROMYCIN 250 MG/1
500 TABLET, FILM COATED ORAL EVERY 24 HOURS
Status: COMPLETED | OUTPATIENT
Start: 2024-12-12 | End: 2024-12-13

## 2024-12-11 RX ORDER — ALBUTEROL SULFATE 0.83 MG/ML
2.5 SOLUTION RESPIRATORY (INHALATION) EVERY 4 HOURS PRN
Status: DISCONTINUED | OUTPATIENT
Start: 2024-12-11 | End: 2024-12-12

## 2024-12-11 RX ORDER — BUSPIRONE HYDROCHLORIDE 5 MG/1
10 TABLET ORAL EVERY 6 HOURS PRN
Status: DISCONTINUED | OUTPATIENT
Start: 2024-12-11 | End: 2024-12-21 | Stop reason: HOSPADM

## 2024-12-11 RX ORDER — ATORVASTATIN CALCIUM 40 MG/1
40 TABLET, FILM COATED ORAL NIGHTLY
Status: DISCONTINUED | OUTPATIENT
Start: 2024-12-11 | End: 2024-12-21 | Stop reason: HOSPADM

## 2024-12-11 RX ORDER — SODIUM CHLORIDE 9 MG/ML
INJECTION, SOLUTION INTRAVENOUS PRN
Status: DISCONTINUED | OUTPATIENT
Start: 2024-12-11 | End: 2024-12-21 | Stop reason: HOSPADM

## 2024-12-11 RX ORDER — SODIUM CHLORIDE 450 MG/100ML
INJECTION, SOLUTION INTRAVENOUS CONTINUOUS
Status: DISCONTINUED | OUTPATIENT
Start: 2024-12-11 | End: 2024-12-11

## 2024-12-11 RX ORDER — ZIPRASIDONE MESYLATE 20 MG/ML
5 INJECTION, POWDER, LYOPHILIZED, FOR SOLUTION INTRAMUSCULAR ONCE
Status: COMPLETED | OUTPATIENT
Start: 2024-12-11 | End: 2024-12-11

## 2024-12-11 RX ORDER — ONDANSETRON 2 MG/ML
4 INJECTION INTRAMUSCULAR; INTRAVENOUS EVERY 6 HOURS PRN
Status: DISCONTINUED | OUTPATIENT
Start: 2024-12-11 | End: 2024-12-21 | Stop reason: HOSPADM

## 2024-12-11 RX ORDER — 0.9 % SODIUM CHLORIDE 0.9 %
500 INTRAVENOUS SOLUTION INTRAVENOUS ONCE
Status: COMPLETED | OUTPATIENT
Start: 2024-12-11 | End: 2024-12-12

## 2024-12-11 RX ORDER — GUAIFENESIN 600 MG/1
600 TABLET, EXTENDED RELEASE ORAL DAILY
Status: DISCONTINUED | OUTPATIENT
Start: 2024-12-11 | End: 2024-12-21 | Stop reason: HOSPADM

## 2024-12-11 RX ORDER — IPRATROPIUM BROMIDE AND ALBUTEROL SULFATE 2.5; .5 MG/3ML; MG/3ML
1 SOLUTION RESPIRATORY (INHALATION) ONCE
Status: COMPLETED | OUTPATIENT
Start: 2024-12-11 | End: 2024-12-11

## 2024-12-11 RX ORDER — LORAZEPAM 0.5 MG/1
0.5 TABLET ORAL EVERY 6 HOURS PRN
Status: DISCONTINUED | OUTPATIENT
Start: 2024-12-11 | End: 2024-12-12

## 2024-12-11 RX ORDER — ACETAMINOPHEN 325 MG/1
650 TABLET ORAL EVERY 6 HOURS PRN
Status: DISCONTINUED | OUTPATIENT
Start: 2024-12-11 | End: 2024-12-21 | Stop reason: HOSPADM

## 2024-12-11 RX ORDER — ONDANSETRON 4 MG/1
4 TABLET, ORALLY DISINTEGRATING ORAL EVERY 8 HOURS PRN
Status: DISCONTINUED | OUTPATIENT
Start: 2024-12-11 | End: 2024-12-21 | Stop reason: HOSPADM

## 2024-12-11 RX ORDER — METOPROLOL TARTRATE 25 MG/1
12.5 TABLET, FILM COATED ORAL 2 TIMES DAILY
Status: DISCONTINUED | OUTPATIENT
Start: 2024-12-11 | End: 2024-12-21 | Stop reason: HOSPADM

## 2024-12-11 RX ORDER — ESCITALOPRAM OXALATE 10 MG/1
10 TABLET ORAL DAILY
Status: DISCONTINUED | OUTPATIENT
Start: 2024-12-11 | End: 2024-12-21 | Stop reason: HOSPADM

## 2024-12-11 RX ORDER — ACETAMINOPHEN 650 MG/1
650 SUPPOSITORY RECTAL EVERY 6 HOURS PRN
Status: DISCONTINUED | OUTPATIENT
Start: 2024-12-11 | End: 2024-12-21 | Stop reason: HOSPADM

## 2024-12-11 RX ORDER — GABAPENTIN 100 MG/1
100 CAPSULE ORAL 3 TIMES DAILY
Status: DISCONTINUED | OUTPATIENT
Start: 2024-12-11 | End: 2024-12-21 | Stop reason: HOSPADM

## 2024-12-11 RX ORDER — SODIUM CHLORIDE 0.9 % (FLUSH) 0.9 %
5-40 SYRINGE (ML) INJECTION PRN
Status: DISCONTINUED | OUTPATIENT
Start: 2024-12-11 | End: 2024-12-21 | Stop reason: HOSPADM

## 2024-12-11 RX ORDER — LORAZEPAM 2 MG/ML
1 INJECTION INTRAMUSCULAR ONCE
Status: COMPLETED | OUTPATIENT
Start: 2024-12-11 | End: 2024-12-11

## 2024-12-11 RX ORDER — DOCUSATE SODIUM 100 MG/1
100 CAPSULE, LIQUID FILLED ORAL DAILY
Status: DISCONTINUED | OUTPATIENT
Start: 2024-12-11 | End: 2024-12-21 | Stop reason: HOSPADM

## 2024-12-11 RX ORDER — LEVOTHYROXINE SODIUM 25 UG/1
25 TABLET ORAL DAILY
Status: DISCONTINUED | OUTPATIENT
Start: 2024-12-11 | End: 2024-12-21 | Stop reason: HOSPADM

## 2024-12-11 RX ADMIN — ALBUTEROL SULFATE 2.5 MG: 2.5 SOLUTION RESPIRATORY (INHALATION) at 19:58

## 2024-12-11 RX ADMIN — SODIUM CHLORIDE 500 ML: 9 INJECTION, SOLUTION INTRAVENOUS at 23:10

## 2024-12-11 RX ADMIN — ZIPRASIDONE MESYLATE 5 MG: 20 INJECTION, POWDER, LYOPHILIZED, FOR SOLUTION INTRAMUSCULAR at 21:08

## 2024-12-11 RX ADMIN — WATER 1000 MG: 1 INJECTION INTRAMUSCULAR; INTRAVENOUS; SUBCUTANEOUS at 12:11

## 2024-12-11 RX ADMIN — LORAZEPAM 1 MG: 2 INJECTION INTRAMUSCULAR; INTRAVENOUS at 11:58

## 2024-12-11 RX ADMIN — BUDESONIDE 500 MCG: 0.5 SUSPENSION RESPIRATORY (INHALATION) at 19:58

## 2024-12-11 RX ADMIN — DILTIAZEM HYDROCHLORIDE 5 MG: 5 INJECTION INTRAVENOUS at 13:52

## 2024-12-11 RX ADMIN — IPRATROPIUM BROMIDE AND ALBUTEROL SULFATE 1 DOSE: 2.5; .5 SOLUTION RESPIRATORY (INHALATION) at 12:04

## 2024-12-11 RX ADMIN — DILTIAZEM HYDROCHLORIDE 5 MG: 5 INJECTION INTRAVENOUS at 18:01

## 2024-12-11 RX ADMIN — SODIUM CHLORIDE, PRESERVATIVE FREE 10 ML: 5 INJECTION INTRAVENOUS at 21:52

## 2024-12-11 RX ADMIN — AZITHROMYCIN MONOHYDRATE 500 MG: 500 INJECTION, POWDER, LYOPHILIZED, FOR SOLUTION INTRAVENOUS at 12:15

## 2024-12-11 ASSESSMENT — PAIN SCALES - GENERAL
PAINLEVEL_OUTOF10: 0
PAINLEVEL_OUTOF10: 0

## 2024-12-11 ASSESSMENT — PAIN SCALES - PAIN ASSESSMENT IN ADVANCED DEMENTIA (PAINAD)
CONSOLABILITY: DISTRACTED OR REASSURED BY VOICE/TOUCH
FACIALEXPRESSION: FACIAL GRIMACING
NEGVOCALIZATION: OCCASIONAL MOAN/GROAN, LOW SPEECH, NEGATIVE/DISAPPROVING QUALITY
BODYLANGUAGE: TENSE, DISTRESSED PACING, FIDGETING
BREATHING: OCCASIONAL LABORED BREATHING, SHORT PERIOD OF HYPERVENTILATION
TOTALSCORE: 6

## 2024-12-11 ASSESSMENT — PAIN - FUNCTIONAL ASSESSMENT: PAIN_FUNCTIONAL_ASSESSMENT: PAIN ASSESSMENT IN ADVANCED DEMENTIA (PAINAD)

## 2024-12-11 NOTE — H&P
Hospital Medicine History & Physical      Date of Admission: 12/11/2024    Date of Service:  Pt seen/examined on 12/11/2024       [x]Admitted to Inpatient with expected LOS greater than two midnights due to medical therapy.  []Placed in Observation status.    Chief Admission Complaint: Shortness of breath    Presenting Admission History:      84 y.o. female who presented to [facility] with increasing shortness of breath over the last couple days.  Patient recently admitted to hospital at the beginning December.  Patient presents with possible pulmonary edema and pneumonia.  Patient complaining of shortness of breath and orthopnea.  Patient has had more lethargy.  Patient's O2 sat was in the 60s at the facility.  Increased to 100% with increase in O2.  Patient on 2 to 3 L at baseline.  Patient is lethargic and is a poor historian.  Patient denies any chest pain.  No nausea vomiting.  Patient has had repeated admissions in the past 30 days.    PMHx significant for:    Past Medical History:   Diagnosis Date    NADJA (acute kidney injury) ([facility])     Asthma     Atrial fibrillation with RVR ([facility])     CAD (coronary artery disease)     CHF (congestive heart failure) ([facility])     unsure    Chronic anxiety     COPD (chronic obstructive pulmonary disease) ([facility])     COPD (chronic obstructive pulmonary disease) ([facility]) 08/19/2023    GERD (gastroesophageal reflux disease) 09/09/2021    Heart attack ([facility]) 2019    History of blood transfusion     Hyperlipidemia     Hypertension     MRSA (methicillin resistant staph aureus) culture positive 09/22/2019    + resp cx    OA (osteoarthritis) 08/12/2014    Thyroid disease            Assessment/Plan:      Current Principal Problem:  <principal problem not specified>    1.  Hypoxic and hypercapnic respiratory failure.  Patient requiring increased O2.  Continue pulmonary toilet.  Will continue breathing treatments.  Probably secondary to underlying pneumonia.  Patient with underlying COPD.   (ANTIVERT) 12.5 MG tablet Take 1 tablet by mouth 3 times daily as needed for Dizziness 12/6/24 1/5/25  Martina Kumar MD   furosemide (LASIX) 20 MG tablet Take 1 tablet by mouth daily 12/6/24   Martina Kumar MD   metoprolol tartrate (LOPRESSOR) 25 MG tablet Take 0.5 tablets by mouth 2 times daily 12/6/24   Martina Kumar MD   midodrine (PROAMATINE) 5 MG tablet Take 1 tablet by mouth 2 times daily as needed (SBP < 90 or MAP < 65) 12/6/24   Martina Kumar MD   budesonide (PULMICORT) 0.5 MG/2ML nebulizer suspension Take 2 mLs by nebulization in the morning and 2 mLs in the evening. 11/25/24   Mena Calzada MD   gabapentin (NEURONTIN) 100 MG capsule Take 1 capsule by mouth 3 times daily for 30 days. 11/25/24 12/25/24  Mena Calzada MD   escitalopram (LEXAPRO) 10 MG tablet Take 1 tablet by mouth daily 11/26/24   Mena Calzada MD   guaiFENesin (MUCINEX) 600 MG extended release tablet Take 1 tablet by mouth daily 11/26/24 12/26/24  Mena Calzada MD   fluticasone (FLONASE) 50 MCG/ACT nasal spray 1 spray by Each Nostril route daily as needed for Rhinitis 11/25/24   Mena Calzada MD   magnesium oxide (MAG-OX) 400 (240 Mg) MG tablet Take 1 tablet by mouth daily 11/25/24 12/25/24  Mena Calzada MD   levothyroxine (SYNTHROID) 25 MCG tablet Take 1 tablet by mouth Daily for 120 doses 11/26/24 3/26/25  Mena Calzada MD   pantoprazole (PROTONIX) 40 MG tablet Take 1 tablet by mouth in the morning and at bedtime 11/25/24   Mena Calzada MD   potassium chloride (KLOR-CON M) 10 MEQ extended release tablet Take 2 tablets by mouth daily for 5 days 11/25/24 12/1/24  Mena Calzada MD   ferrous sulfate (IRON 325) 325 (65 Fe) MG tablet Take 1 tablet by mouth daily (with breakfast)    ProviderCharmaine MD   LORazepam (ATIVAN) 0.5 MG tablet Take 1 tablet by mouth every 6 hours as needed for Anxiety.    ProviderCharmaine MD   phenol 1.4 % LIQD mouth spray Take 1 spray by mouth every

## 2024-12-11 NOTE — ED PROVIDER NOTES
Emergency Department Provider Note  Location: [facility]  ED  12/11/2024     Patient Identification  Terri Smith is a 84 y.o. female    Chief Complaint  Shortness of Breath (Patient from [facility], called for SOB, staff reported O2 sat in the 60's, however EMS reports 100% on RA. Per pt's son pt more lethargic and less conversive than usual but is at baseline mental status. Pt wears 2-3L NC at baseline for CHF and COPD)      Mode of Arrival  EMS    HPI  (History provided by patient and EMS)  This is a 84 y.o. female with a PMH significant for COPD, malnutrition, CAD, CHF  presented today for shortness of breath.  Symptoms started today.  Patient's son reports that she is more lethargic and less conversive than usual.  She is at her baseline mental status.  She typically wears 2 to 3 L nasal cannula at baseline.  EMS reports that her sats were in the 60s on room air but then increased to 100%.  She reports feeling nauseous but denies any vomiting.  Denies any abdominal pain.  Patient is a poor historian at baseline.      ROS  Review of Systems   Respiratory:  Positive for shortness of breath.    All other systems reviewed and are negative.        I have reviewed the following nursing documentation:  Allergies:   Allergies   Allergen Reactions    Latex Other (See Comments)     Burning    Other reaction(s): Dermatitis, Other (See Comments)  Burning  Burning      Codeine Other (See Comments)     \"hallucinations\"  Other reaction(s): Other (See Comments)  \"hallucinations\"  Hallucinations    Hallucinations         Past medical history:  has a past medical history of NADJA (acute kidney injury) ([facility]), Asthma, Atrial fibrillation with RVR ([facility]), CAD (coronary artery disease), CHF (congestive heart failure) ([facility]), Chronic anxiety, COPD (chronic obstructive pulmonary disease) ([facility]), COPD (chronic obstructive pulmonary disease) ([facility]) (08/19/2023), GERD (gastroesophageal reflux disease) (09/09/2021), Heart attack

## 2024-12-11 NOTE — ED NOTES
Patient repeatedly yelling from ER stretcher \"Help me, god save me\" \"I have to leave, let me go\" when asked what pt needs help with, pt unable to provide answer and continues to yell \"help me, let me go home\" pt pulling at monitor leads and removing pulse oximeter, pt not responding to verbal redirection. Pt repositioned, provided warm blankets, TV turned on, and lights dimmed. Pt continues to yell, pull at monitor wires, and is tearful. % O2 on 3L NC, , unchanged from upon arrival to this ER. Dr. Henderson to bedside, see orders. No family for patient in lobby at this time.

## 2024-12-11 NOTE — ED NOTES
Patient yelling from stretcher \"Please\" \"Get me out of here\" \"Help me\" when asked what patient needs help with pt continues to repeat similar phrases and does not verbalize needs. Pt O2 sat dropped to 80% on 3L NC when pt was yelling, when pt instructed to focus on breathing and not speaking pt jorge back to 99% on baseline NC O2. Pt able to be redirected by staff.

## 2024-12-11 NOTE — PROGRESS NOTES
4 Eyes Skin Assessment     NAME:  Terri Smith  YOB: 1940  MEDICAL RECORD NUMBER:  [id number]    The patient is being assessed for  Admission    I agree that at least one RN has performed a thorough Head to Toe Skin Assessment on the patient. ALL assessment sites listed below have been assessed.      Areas assessed by both nurses:    Head, Face, Ears, Shoulders, Back, Chest, Arms, Elbows, Hands, Sacrum. Buttock, Coccyx, Ischium, Legs. Feet and Heels, and Under Medical Devices         Does the Patient have a Wound? YES, RIGHT ELBOW; Redness on Coccyx.        Trey Prevention initiated by RN: Yes  Wound Care Orders initiated by RN: No    Pressure Injury (Stage 3,4, Unstageable, DTI, NWPT, and Complex wounds) if present, place Wound referral order by RN under : No    New Ostomies, if present place, Ostomy referral order under : No     Nurse 1 eSignature: Electronically signed by Reanna Da Silva RN on 12/11/24 at 6:27 PM EST    **SHARE this note so that the co-signing nurse can place an eSignature**    Nurse 2 eSignature: Electronically signed by Araseli Ayala RN on 12/11/24 at 6:48 PM EST

## 2024-12-11 NOTE — TELEPHONE ENCOUNTER
Is the 2nd week of January 2025 sufficient with [facility] for a 2 wk hsfu? Unfortunately the original message was not answered below:      Looks like nothing available until 2 week in jan. Care facility concerned with time frame.  Is this okay? Can patient follow up with another provider?

## 2024-12-11 NOTE — CARE COORDINATION
Case Management Assessment  Initial Evaluation    Date/Time of Evaluation: 12/11/2024 3:06 PM  Assessment Completed by: VANIA Fabian    If patient is discharged prior to next notation, then this note serves as note for discharge by case management.    Patient Name: Terri Smith                   YOB: 1940  Diagnosis: Pneumonia, unspecified organism [J18.9]                   Date / Time: 12/11/2024 10:48 AM    Patient Admission Status: Inpatient   Readmission Risk (Low < 19, Mod (19-27), High > 27): Readmission Risk Score: 35    Current PCP: Jackson Ontiveros MD  PCP verified by CM? (P) Yes    Chart Reviewed: Yes      History Provided by: (P) Child/Family  Patient Orientation: (P) Person    Patient Cognition: (P) Severely Impaired    Hospitalization in the last 30 days (Readmission):  Yes    If yes, Readmission Assessment in CM Navigator will be completed.    Advance Directives:      Code Status: Prior   Patient's Primary Decision Maker is: (P) Legal Next of Kin    Primary Decision Maker: LuisArjunsurindergrace - Child - [phone]    Secondary Decision Maker: WillieHafsa - Grandchild - [phone]    Secondary Decision Maker: Raphael Smith - Child - [phone]    Discharge Planning:    Patient lives with: (P) Children (usually lives at home with son, currently at [facility]) Type of Home: (P) House  Primary Care Giver: (P) Self  Patient Support Systems include: (P) Children   Current Financial resources: (P) None  Current community resources: (P) ECF/Home Care  Current services prior to admission: (P) Skilled Nursing Facility            Current DME:              Type of Home Care services:  (P) None    ADLS  Prior functional level: (P) Assistance with the following:, Cooking, Housework, Shopping  Current functional level: (P) Assistance with the following:, Bathing, Dressing, Toileting, Cooking, Housework, Shopping, Mobility    PT AM-PAC:   /24  OT AM-PAC:   /24    Family can provide

## 2024-12-11 NOTE — ED NOTES
Patient stating \"I don't want to be here anymore, just let me go\" Pt's son at bedside talking to pt stating \"you do not want to die no mom you want to live\" Pt denies suicidal ideation or active plans to die or harm self, just does not want to be in the hospital receiving treatment. Pt stating \"get me out\" and is tearful, pt's son informing pt she has to stay.

## 2024-12-11 NOTE — ED NOTES
Terri Smith is a 84 y.o. female admitted for  Principal Problem:    Pneumonia, unspecified organism  Resolved Problems:    * No resolved hospital problems. *  .   Patient SNF LTC via EMS transportation with   Chief Complaint   Patient presents with    Shortness of Breath     Patient from [facility], called for SOB, staff reported O2 sat in the 60's, however EMS reports 100% on RA. Per pt's son pt more lethargic and less conversive than usual but is at baseline mental status. Pt wears 2-3L NC at baseline for CHF and COPD   .  Patient is alert and Person  Patient's baseline mobility: Baseline Mobility: Bed bound  at this time. Unknown  Code Status: Prior   Cardiac Rhythm:    O2 Flow Rate (L/min): 3 L/min  Is patient on baseline Oxygen: yes, how many Liters: 2-3  Abnormal Assessment Findings: pt now on 4L NC    Isolation: None      NIH Score:    C-SSRS: Risk of Suicide: No Risk  Bedside swallow:        Active LDA's:   Peripheral IV 12/11/24 Posterior;Right Forearm (Active)   Site Assessment Clean, dry & intact 12/11/24 1126   Line Status Blood return noted;Flushed;Normal saline locked 12/11/24 1126   Phlebitis Assessment No symptoms 12/11/24 1126   Infiltration Assessment 0 12/11/24 1126   Dressing Status New dressing applied 12/11/24 1126   Dressing Type Transparent 12/11/24 1126   Dressing Intervention New 12/11/24 1126     Patient admitted with a bond: no If the bond is chronic was it exchanged:  Reason for bond:   Patient admitted with Central Line:  . PICC line placement confirmed: YES OR NO:[id number]}   Reason for Central line:   Was central line Inserted from an outside facility:        Family/Caregiver Present no Any Concerns:    Restraints no  Sitter no         Vitals: MEWS Score: 5    Vitals:    12/11/24 1211 12/11/24 1239 12/11/24 1254 12/11/24 1319   BP: 130/69 (!) 162/73 122/73 125/74   Pulse: (!) 124 (!) 145 (!) 124 (!) 133   Resp: (!) 39 (!) 36 (!) 37 (!) 43   Temp:       TempSrc:       SpO2: 100% 91%  97% 99%   Weight:           Last documented pain score (0-10 scale)    Pain medication administered No. Administered 1mg Ativan    Pertinent or High Risk Medications/Drips: No.    Pending Blood Product Administration: no    Abnormal labs:   Abnormal Labs Reviewed   CBC WITH AUTO DIFFERENTIAL - Abnormal; Notable for the following components:       Result Value    WBC 15.1 (*)     RBC 3.19 (*)     Hemoglobin 9.4 (*)     Hematocrit 31.0 (*)     MCHC 30.3 (*)     Neutrophils Absolute 13.2 (*)     All other components within normal limits   COMPREHENSIVE METABOLIC PANEL W/ REFLEX TO MG FOR LOW K - Abnormal; Notable for the following components:    Sodium 146 (*)     Potassium reflex Magnesium 5.3 (*)     CO2 34 (*)     Creatinine 1.3 (*)     Est, Glom Filt Rate 41 (*)     Albumin/Globulin Ratio 1.0 (*)     ALT 8 (*)     All other components within normal limits   BLOOD GAS, VENOUS - Abnormal; Notable for the following components:    pH, Zach 7.198 (*)     pCO2, Zach 85.2 (*)     PO2, Zach 48.4 (*)     HCO3, Venous 32.4 (*)     Carboxyhemoglobin 1.9 (*)     All other components within normal limits    Narrative:     CALL  [facility] tel. [phone],  Chemistry results called to and read back by Larisa Blackwell RN, 12/11/2024  11:44, by KEYLA   BRAIN NATRIURETIC PEPTIDE - Abnormal; Notable for the following components:    NT Pro-BNP 2,778 (*)     All other components within normal limits   TROPONIN - Abnormal; Notable for the following components:    Troponin, High Sensitivity 73 (*)     All other components within normal limits     Critical values: yes  Intervention for critical value(s):     Abnormal Imaging: yes,  xRay            You may also review the ED PT Care Timeline found under the Summary Tab, ED Encounter Summary, Timeline Reports, ED Patient Care Timeline.     Recommendation    Pending orders/Uncompleted orders to hand off:  Pt on 4L NC saturation at 95-97%. When upset she dips down into the 70's from yelling out.

## 2024-12-12 ENCOUNTER — APPOINTMENT (OUTPATIENT)
Dept: GENERAL RADIOLOGY | Age: 84
End: 2024-12-12
Payer: MEDICARE

## 2024-12-12 LAB
ALBUMIN SERPL-MCNC: 3.2 G/DL (ref 3.4–5)
ALBUMIN/GLOB SERPL: 0.9 {RATIO} (ref 1.1–2.2)
ALP SERPL-CCNC: 83 U/L (ref 40–129)
ALT SERPL-CCNC: <5 U/L (ref 10–40)
ANION GAP SERPL CALCULATED.3IONS-SCNC: 17 MMOL/L (ref 3–16)
AST SERPL-CCNC: 19 U/L (ref 15–37)
BASOPHILS # BLD: 0 K/UL (ref 0–0.2)
BASOPHILS NFR BLD: 0.1 %
BILIRUB SERPL-MCNC: <0.2 MG/DL (ref 0–1)
BUN SERPL-MCNC: 21 MG/DL (ref 7–20)
CALCIUM SERPL-MCNC: 10.7 MG/DL (ref 8.3–10.6)
CHLORIDE SERPL-SCNC: 102 MMOL/L (ref 99–110)
CO2 SERPL-SCNC: 27 MMOL/L (ref 21–32)
CREAT SERPL-MCNC: 1.1 MG/DL (ref 0.6–1.2)
DEPRECATED RDW RBC AUTO: 15.1 % (ref 12.4–15.4)
EKG ATRIAL RATE: 129 BPM
EKG ATRIAL RATE: 136 BPM
EKG DIAGNOSIS: NORMAL
EKG DIAGNOSIS: NORMAL
EKG P AXIS: 78 DEGREES
EKG P AXIS: 81 DEGREES
EKG P-R INTERVAL: 128 MS
EKG P-R INTERVAL: 128 MS
EKG Q-T INTERVAL: 274 MS
EKG Q-T INTERVAL: 282 MS
EKG QRS DURATION: 76 MS
EKG QRS DURATION: 78 MS
EKG QTC CALCULATION (BAZETT): 412 MS
EKG QTC CALCULATION (BAZETT): 413 MS
EKG R AXIS: -62 DEGREES
EKG R AXIS: -66 DEGREES
EKG T AXIS: 80 DEGREES
EKG T AXIS: 88 DEGREES
EKG VENTRICULAR RATE: 129 BPM
EKG VENTRICULAR RATE: 136 BPM
EOSINOPHIL # BLD: 0 K/UL (ref 0–0.6)
EOSINOPHIL NFR BLD: 0.1 %
GFR SERPLBLD CREATININE-BSD FMLA CKD-EPI: 50 ML/MIN/{1.73_M2}
GLUCOSE SERPL-MCNC: 82 MG/DL (ref 70–99)
HCT VFR BLD AUTO: 30.5 % (ref 36–48)
HCT VFR BLD AUTO: 31 % (ref 36–48)
HEMOCCULT STL QL: ABNORMAL
HGB BLD-MCNC: 9.3 G/DL (ref 12–16)
HGB BLD-MCNC: 9.6 G/DL (ref 12–16)
LYMPHOCYTES # BLD: 0.6 K/UL (ref 1–5.1)
LYMPHOCYTES NFR BLD: 5.1 %
MAGNESIUM SERPL-MCNC: 2.27 MG/DL (ref 1.8–2.4)
MCH RBC QN AUTO: 29.8 PG (ref 26–34)
MCHC RBC AUTO-ENTMCNC: 30.1 G/DL (ref 31–36)
MCV RBC AUTO: 98.9 FL (ref 80–100)
MONOCYTES # BLD: 0.3 K/UL (ref 0–1.3)
MONOCYTES NFR BLD: 2.4 %
NEUTROPHILS # BLD: 10.3 K/UL (ref 1.7–7.7)
NEUTROPHILS NFR BLD: 92.3 %
PLATELET # BLD AUTO: 378 K/UL (ref 135–450)
PMV BLD AUTO: 7.2 FL (ref 5–10.5)
POTASSIUM SERPL-SCNC: 5 MMOL/L (ref 3.5–5.1)
PROT SERPL-MCNC: 6.8 G/DL (ref 6.4–8.2)
RBC # BLD AUTO: 3.14 M/UL (ref 4–5.2)
SODIUM SERPL-SCNC: 146 MMOL/L (ref 136–145)
T3 SERPL-MCNC: 0.61 NG/ML (ref 0.8–2)
T4 FREE SERPL-MCNC: 1.2 NG/DL (ref 0.9–1.8)
TROPONIN, HIGH SENSITIVITY: 78 NG/L (ref 0–14)
TSH SERPL DL<=0.005 MIU/L-ACNC: 0.2 UIU/ML (ref 0.27–4.2)
WBC # BLD AUTO: 11.2 K/UL (ref 4–11)

## 2024-12-12 PROCEDURE — 83735 ASSAY OF MAGNESIUM: CPT

## 2024-12-12 PROCEDURE — 94640 AIRWAY INHALATION TREATMENT: CPT

## 2024-12-12 PROCEDURE — 92526 ORAL FUNCTION THERAPY: CPT

## 2024-12-12 PROCEDURE — 84484 ASSAY OF TROPONIN QUANT: CPT

## 2024-12-12 PROCEDURE — 2060000000 HC ICU INTERMEDIATE R&B

## 2024-12-12 PROCEDURE — 93010 ELECTROCARDIOGRAM REPORT: CPT | Performed by: INTERNAL MEDICINE

## 2024-12-12 PROCEDURE — 82270 OCCULT BLOOD FECES: CPT

## 2024-12-12 PROCEDURE — 6360000002 HC RX W HCPCS: Performed by: INTERNAL MEDICINE

## 2024-12-12 PROCEDURE — 36415 COLL VENOUS BLD VENIPUNCTURE: CPT

## 2024-12-12 PROCEDURE — 94761 N-INVAS EAR/PLS OXIMETRY MLT: CPT

## 2024-12-12 PROCEDURE — 71045 X-RAY EXAM CHEST 1 VIEW: CPT

## 2024-12-12 PROCEDURE — 84480 ASSAY TRIIODOTHYRONINE (T3): CPT

## 2024-12-12 PROCEDURE — 2700000000 HC OXYGEN THERAPY PER DAY

## 2024-12-12 PROCEDURE — 97166 OT EVAL MOD COMPLEX 45 MIN: CPT

## 2024-12-12 PROCEDURE — 93005 ELECTROCARDIOGRAM TRACING: CPT | Performed by: INTERNAL MEDICINE

## 2024-12-12 PROCEDURE — 92610 EVALUATE SWALLOWING FUNCTION: CPT

## 2024-12-12 PROCEDURE — 6370000000 HC RX 637 (ALT 250 FOR IP): Performed by: INTERNAL MEDICINE

## 2024-12-12 PROCEDURE — 97530 THERAPEUTIC ACTIVITIES: CPT

## 2024-12-12 PROCEDURE — 2500000003 HC RX 250 WO HCPCS: Performed by: INTERNAL MEDICINE

## 2024-12-12 PROCEDURE — 80053 COMPREHEN METABOLIC PANEL: CPT

## 2024-12-12 PROCEDURE — 97162 PT EVAL MOD COMPLEX 30 MIN: CPT

## 2024-12-12 PROCEDURE — 85025 COMPLETE CBC W/AUTO DIFF WBC: CPT

## 2024-12-12 PROCEDURE — 85014 HEMATOCRIT: CPT

## 2024-12-12 PROCEDURE — 84443 ASSAY THYROID STIM HORMONE: CPT

## 2024-12-12 PROCEDURE — 85018 HEMOGLOBIN: CPT

## 2024-12-12 PROCEDURE — 2580000003 HC RX 258: Performed by: INTERNAL MEDICINE

## 2024-12-12 PROCEDURE — 84439 ASSAY OF FREE THYROXINE: CPT

## 2024-12-12 PROCEDURE — 99223 1ST HOSP IP/OBS HIGH 75: CPT | Performed by: INTERNAL MEDICINE

## 2024-12-12 RX ORDER — FENTANYL CITRATE 50 UG/ML
25 INJECTION, SOLUTION INTRAMUSCULAR; INTRAVENOUS
Status: DISCONTINUED | OUTPATIENT
Start: 2024-12-12 | End: 2024-12-21 | Stop reason: HOSPADM

## 2024-12-12 RX ORDER — ALBUTEROL SULFATE 0.83 MG/ML
2.5 SOLUTION RESPIRATORY (INHALATION)
Status: DISCONTINUED | OUTPATIENT
Start: 2024-12-12 | End: 2024-12-21 | Stop reason: HOSPADM

## 2024-12-12 RX ORDER — METOPROLOL TARTRATE 1 MG/ML
5 INJECTION, SOLUTION INTRAVENOUS ONCE
Status: COMPLETED | OUTPATIENT
Start: 2024-12-12 | End: 2024-12-12

## 2024-12-12 RX ADMIN — WATER 1000 MG: 1 INJECTION INTRAMUSCULAR; INTRAVENOUS; SUBCUTANEOUS at 10:19

## 2024-12-12 RX ADMIN — GUAIFENESIN 600 MG: 600 TABLET ORAL at 10:18

## 2024-12-12 RX ADMIN — ESCITALOPRAM OXALATE 10 MG: 10 TABLET ORAL at 10:18

## 2024-12-12 RX ADMIN — SODIUM CHLORIDE, PRESERVATIVE FREE 10 ML: 5 INJECTION INTRAVENOUS at 11:54

## 2024-12-12 RX ADMIN — BUDESONIDE 500 MCG: 0.5 SUSPENSION RESPIRATORY (INHALATION) at 20:07

## 2024-12-12 RX ADMIN — ALBUTEROL SULFATE 2.5 MG: 2.5 SOLUTION RESPIRATORY (INHALATION) at 12:05

## 2024-12-12 RX ADMIN — AZITHROMYCIN 500 MG: 250 TABLET, FILM COATED ORAL at 10:18

## 2024-12-12 RX ADMIN — ATORVASTATIN CALCIUM 40 MG: 40 TABLET, FILM COATED ORAL at 20:02

## 2024-12-12 RX ADMIN — ALBUTEROL SULFATE 2.5 MG: 2.5 SOLUTION RESPIRATORY (INHALATION) at 17:47

## 2024-12-12 RX ADMIN — ALBUTEROL SULFATE 2.5 MG: 2.5 SOLUTION RESPIRATORY (INHALATION) at 20:07

## 2024-12-12 RX ADMIN — METOPROLOL TARTRATE 5 MG: 5 INJECTION INTRAVENOUS at 11:53

## 2024-12-12 RX ADMIN — METOPROLOL TARTRATE 12.5 MG: 25 TABLET, FILM COATED ORAL at 10:18

## 2024-12-12 RX ADMIN — BUSPIRONE HYDROCHLORIDE 10 MG: 5 TABLET ORAL at 20:02

## 2024-12-12 RX ADMIN — GABAPENTIN 100 MG: 100 CAPSULE ORAL at 20:01

## 2024-12-12 RX ADMIN — DOCUSATE SODIUM 100 MG: 100 CAPSULE, LIQUID FILLED ORAL at 10:18

## 2024-12-12 RX ADMIN — SODIUM CHLORIDE, PRESERVATIVE FREE 10 ML: 5 INJECTION INTRAVENOUS at 20:07

## 2024-12-12 RX ADMIN — APIXABAN 2.5 MG: 5 TABLET, FILM COATED ORAL at 10:18

## 2024-12-12 RX ADMIN — METOPROLOL TARTRATE 12.5 MG: 25 TABLET, FILM COATED ORAL at 20:02

## 2024-12-12 RX ADMIN — GABAPENTIN 100 MG: 100 CAPSULE ORAL at 10:18

## 2024-12-12 RX ADMIN — APIXABAN 2.5 MG: 5 TABLET, FILM COATED ORAL at 20:02

## 2024-12-12 ASSESSMENT — PAIN DESCRIPTION - LOCATION: LOCATION: ABDOMEN

## 2024-12-12 ASSESSMENT — PAIN SCALES - GENERAL
PAINLEVEL_OUTOF10: 0
PAINLEVEL_OUTOF10: 8

## 2024-12-12 ASSESSMENT — PAIN DESCRIPTION - DESCRIPTORS: DESCRIPTORS: TENDER

## 2024-12-12 NOTE — PROGRESS NOTES
RRT asked to place patient on bipap, patient refused at this time. PRN breathing treatment given. Patient pulling at mask multiple times, redirected to leave mask alone. RN notified.

## 2024-12-12 NOTE — PROGRESS NOTES
Virtual Sitter placed in room due to patient removing oxygen and unable to be redirected.    Lakishas called that patient removed oxygen, within a couple of minutes on room air, patient desatted down to 53%. Oxygen replaced and SPO2 recovered to 92%. Wrist restraints ordered and placed on patient.    Emergency Contact Juan Smith, child, called and notified of placement of restraints. Daughter states patient is oriented x4 at baseline, unsure of why she is so confused. Stated \"she's been confused since the nursing home gave her ativan every 6 hours and sedated her.\"

## 2024-12-12 NOTE — PROGRESS NOTES
Physical Therapy  [address]  Physical Therapy Initial Assessment    Name: Terri Smith  : 1940  MRN: [id number]  Date of Service: 2024    Discharge Recommendations:  Subacute/Skilled Nursing Facility   PT Equipment Recommendations  Equipment Needed: No  Other: TBD at SNF    Therapy discharge recommendations are subject to collaboration from the patient’s interdisciplinary healthcare team, including MD and case management recommendations.    Barriers to Home Discharge:   [x] Steps to access home entry or bed/bath: 1 DIANE without handrails   [x] Unable to transfer, ambulate, or propel wheelchair household distances without assist   [] Limited available assist at home upon discharge    [x] Patient or family requests d/c to post-acute facility    [] Poor cognition/safety awareness for d/c to home alone    [] Unable to maintain ordered weight bearing status    [] Patient with salient signs of long-standing immobility   [x] Decreased independence with ADLs   [x] Increased risk for falls    If pt is unable to be seen after this session, please let this note serve as discharge summary.  Please see case management note for discharge disposition.  Thank you.      Patient Diagnosis(es): The primary encounter diagnosis was Pneumonia due to infectious organism, unspecified laterality, unspecified part of lung. Diagnoses of Shortness of breath and Septicemia ([facility]) were also pertinent to this visit.  Past Medical History:  has a past medical history of NADJA (acute kidney injury) ([facility]), Asthma, Atrial fibrillation with RVR ([facility]), CAD (coronary artery disease), CHF (congestive heart failure) ([facility]), Chronic anxiety, COPD (chronic obstructive pulmonary disease) ([facility]), COPD (chronic obstructive pulmonary disease) ([facility]), GERD (gastroesophageal reflux disease), Heart attack ([facility]), History of blood transfusion, Hyperlipidemia, Hypertension, MRSA (methicillin resistant staph aureus) culture  decreased, functional  Strength: Grossly decreased, non-functional  Coordination: Grossly decreased, non-functional  Tone: Normal    Bed Mobility Training  Interventions: Verbal cues;Visual cues;Tactile cues  Supine to Sit: Contact-guard assistance;Additional time  Sit to Supine: Contact-guard assistance;Additional time  Scooting: Moderate assistance;Assist X2;Additional time (to scoot fully up in bed)    Balance  Sitting: Impaired  Sitting - Static: Fair (occasional) (pt sat EOB x 15-20 seconds with CGA/SBA x 1 although sitting time self-limited by pt 2* c/o nausea & sleepiness)  Sitting - Dynamic: Fair (occasional)  Standing: NT - pt declined to attempt standing 2* nausea & sleepiness    Transfer Training  Transfer Training: No (NT - pt declined to attempt standing 2* nausea & sleepiness)    AM-PAC - Mobility    AM-PAC Basic Mobility - Inpatient   How much help is needed turning from your back to your side while in a flat bed without using bedrails?: A Little  How much help is needed moving from lying on your back to sitting on the side of a flat bed without using bedrails?: A Lot  How much help is needed moving to and from a bed to a chair?: Total  How much help is needed standing up from a chair using your arms?: Total  How much help is needed walking in hospital room?: Total  How much help is needed climbing 3-5 steps with a railing?: Total  AM-PAC Inpatient Mobility Raw Score : 9  AM-PAC Inpatient T-Scale Score : 30.55  Mobility Inpatient CMS 0-100% Score: 81.38  Mobility Inpatient CMS G-Code Modifier : CM    Goals  Short Term Goals  Time Frame for Short Term Goals: 1 week, 12/19/24 (unless otherwise specified)  Short Term Goal 1: Pt will transfer supine <-> sit with supervision  Short Term Goal 2: Pt will transfer sit <-> stand and bed>chair using LRAD with CGA x 1  Short Term Goal 3: Pt will ambulate x 40 feet using LRAD with Aimee x 1  Short Term Goal 4: By 12/15/24: Pt will tolerate 12-15 reps BLE exercise

## 2024-12-12 NOTE — PROGRESS NOTES
12/12/24 2371   RT Protocol   History Pulmonary Disease 2   Respiratory pattern 4   Breath sounds 4   Cough 0   Indications for Bronchodilator Therapy None   Bronchodilator Assessment Score 10

## 2024-12-12 NOTE — PROGRESS NOTES
V2.0    [facility] Progress Note      Name:  Terri Smith /Age/Sex: 1940  (84 y.o. female)   MRN & CSN:  [id number] & [id number] Encounter Date/Time: 2024 9:51 AM EST   Location:   PCP: Jackson Ontiveros MD     Attending:Zaki Fermin MD       Hospital Day: 2    Assessment and Recommendations   Terri Smith is a 84 y.o. female with pmh of atrial fibrillation, CAD, CHF, COPD, GERD, hypothyroidism who presents with Pneumonia, unspecified organism and pulmonary edema.    Interval history  Patient seen this morning on bedside.  Patient was not oriented but she was conscious and alert.  She verbally responded to all questions.  She was on 4 L of oxygen via NC.  She denied having any chest pain cough fever or any other complaints.  She was agitated and confused last night when restraints were applied.  She stated feeling better today    Plan:    Hypoxic and hypercapnic respiratory failure.    Patient requiring increased O2.      continue mucoid nebulization.  Probably secondary to underlying pneumonia.    Continue Roflumilast  underlying COPD.    may require BiPAP if breathing worsens.   Consult pulmonary, Initial blood gas consistent with respiratory acidosis, repeat > improved   Community-acquired pneumonia.   CXR >    Continue IV ceftriaxone and azithromycin.   MRSA screen.,  Pending    Monitor leukocytosis. white count 15.1.> 11.2  Paroxysmal atrial fibrillation with rapid ventricular response.  Metoprolol and Eliquis at home  Continue Eliquis at home dosage  Patient given a dose of diltiazem.    Continue telemetry   patient may need Lopressor drip if heart rate remains elevated  Pulmonary 73 on admission, recheck today  Repeat EKG this morning showed tachycardia 125.    cardiology consulted  Hyperkalemia.   had been on potassium supplements.    Will hold and monitor closely.    Hyperkalemia can be secondary to acidosis.  She had mild NADJA on admission, improved with    Recent Labs     12/11/24  1119   LACTA 0.7     BNP:   Recent Labs     12/11/24  1119   PROBNP 2,778*     UA:  Lab Results   Component Value Date/Time    NITRU Negative 12/01/2024 10:46 AM    COLORU Yellow 12/01/2024 10:46 AM    PHUR 6.0 12/01/2024 10:46 AM    PHUR 7.0 12/15/2023 06:26 PM    WBCUA 0-2 10/11/2024 11:45 PM    RBCUA 0-2 10/11/2024 11:45 PM    MUCUS Rare 08/30/2023 01:50 PM    YEAST Present 10/12/2021 04:05 AM    BACTERIA 2+ 08/30/2023 01:50 PM    CLARITYU Clear 12/01/2024 10:46 AM    SPECGRAV <1.005 05/07/2013 01:35 PM    LEUKOCYTESUR Negative 12/01/2024 10:46 AM    UROBILINOGEN 0.2 12/01/2024 10:46 AM    BILIRUBINUR Negative 12/01/2024 10:46 AM    BLOODU Negative 12/01/2024 10:46 AM    GLUCOSEU Negative 12/01/2024 10:46 AM    KETUA Negative 12/01/2024 10:46 AM    AMORPHOUS 2+ 07/05/2023 06:11 PM     Urine Cultures:   Lab Results   Component Value Date/Time    LABURIN  08/30/2023 02:09 PM     <10,000 CFU/ml mixed skin/urogenital milad. No further workup     Blood Cultures:   Lab Results   Component Value Date/Time    BC No Growth after 4 days of incubation. 12/01/2024 02:12 PM     Lab Results   Component Value Date/Time    BLOODCULT2 No Growth after 4 days of incubation. 12/01/2024 02:12 PM     Organism:   Lab Results   Component Value Date/Time    ORG BHS Group B (Strep agalacticae) 07/05/2023 06:11 PM         Electronically signed by Mena Calzada MD on 12/12/2024 at 9:51 AM

## 2024-12-12 NOTE — PROGRESS NOTES
CHF Care Plan      Patient's EF (Ejection Fraction) is greater than 40%    Heart Failure Medications:  Diuretics:: Furosemide    (One of the following REQUIRED for EF </= 40%/SYSTOLIC FAILURE but MAY be used in EF% >40%/DIASTOLIC FAILURE)        ACE:: None        ARB:: None         ARNI:: None    (Beta Blockers)  NON- Evidenced Based Beta Blocker (for EF% >40%/DIASTOLIC FAILURE): Metoprolol TARTrate- Lopressor    Evidenced Based Beta Blocker::(REQUIRED for EF% <40%/SYSTOLIC FAILURE) None  ...................................................................................................................................................    Failed to redirect to the Timeline version of the [employer] SmartLink.      Patient's weights and intake/output reviewed    Daily Weight log at bedside, patient/family participation in use of log: pt unable to participate\" ( )    Patient's current weight today shows a difference of 4 lbs more than last documented weight.    No intake or output data in the 24 hours ending 12/12/24 0614    Education Booklet Provided: yes    Comorbidities Reviewed Yes    Patient has a past medical history of NADJA (acute kidney injury) ([facility]), Asthma, Atrial fibrillation with RVR ([facility]), CAD (coronary artery disease), CHF (congestive heart failure) ([facility]), Chronic anxiety, COPD (chronic obstructive pulmonary disease) ([facility]), COPD (chronic obstructive pulmonary disease) (HCC), GERD (gastroesophageal reflux disease), Heart attack (HCC), History of blood transfusion, Hyperlipidemia, Hypertension, MRSA (methicillin resistant staph aureus) culture positive, OA (osteoarthritis), and Thyroid disease.     >>For CHF and Comorbidity documentation on Education Time and Topics, please see Education Tab      CHF Education    Learners:  Patient  Readineess:   Refuses  Method:   Explanation  Response:    No Evidence of Learning    Comments:     Time Spent: 10 mins      Pt sitting in bed at this time on  4 L O2. Pt with

## 2024-12-12 NOTE — PROGRESS NOTES
Speech Language Pathology  Clinical Bedside Swallow Assessment  Facility/Department: [facility]        Recommendations:  Diet recommendation: NPO and low volume pleasure feeds of puree and thin liquids (straws OK); Meds whole in puree  *Pleasure feeds ONLY when pt alert and upright  Instrumentation: Pt may benefit from MBSS given h/o dysphagia and worsening chest imaging compared to recent admission- to monitor  Risk management: upright for all intake, stay upright for at least 30 mins after intake, small bites/sips, assist feed, 1:1 supervision with intake, oral care q4 hrs to reduce adverse affects in the event of aspiration, increase physical mobility as able, alternate bites/sips, slow rate of intake, general GERD precautions, STRICT aspiration precautions, and hold PO and contact SLP if s/s of aspiration or worsening respiratory status develop.  *Pt will benefit from re-assessment as RAMSES and mentation improves- SLP to monitor    NAME:Terri Smith  : 1940 (84 y.o.)   MRN: [id number]  ROOM: [address] [place] [address]  ADMISSION DATE: 2024  PATIENT DIAGNOSIS(ES): Shortness of breath [R06.02]  Septicemia (HCC) [A41.9]  Pneumonia, unspecified organism [J18.9]  Pneumonia due to infectious organism, unspecified laterality, unspecified part of lung [J18.9]  Chief Complaint   Patient presents with    Shortness of Breath     Patient from [facility], called for SOB, staff reported O2 sat in the 60's, however EMS reports 100% on RA. Per pt's son pt more lethargic and less conversive than usual but is at baseline mental status. Pt wears 2-3L NC at baseline for CHF and COPD     Patient Active Problem List    Diagnosis Date Noted    Pneumonia, unspecified organism 2024    Vertigo 2024    Acute on chronic heart failure with normal ejection fraction (HCC) 2024    Chest pain due to CAD (HCC) 2024    Anticoagulated 2024    History of GI bleed 2024    Acute on chronic anemia 2024     occurs.      Pt also noted with GI hx. Most recent esophogram 9/19/24:  1. Esophagram was performed in semi recumbent position due to patient feeling  nauseous.  2. No large filling defect or stricture identified in the esophagus.  3. Tiny hiatal hernia.  4. Procedure was terminated as patient started vomiting and unable to proceed.     Pt with EGD completed 9/23/24 as well:  POSTPROCEDURE DIAGNOSES:    1. Schatzki's ring, status post dilation.  2. No evidence of Lubin's.    EGD completed 12/5/24 during pt's recent admission and, per pt's RN at that time, was \"negative\" and dilation was not completed. Esophogram was planned but not completed during that admission as well.     Vitals/labs:   SpO2: 97%  RR: 40  BP: 171/71- RN alerted  HR: 127  O2 device: 3L 02 via NC    Lab Values:    CBC:   Recent Labs     12/12/24  0659   WBC 11.2*   HGB 9.3*         BMP:  Recent Labs     12/12/24  0549   *   K 5.0      CO2 27   BUN 21*   CREATININE 1.1   GLUCOSE 82          Cranial nerve exam:   CN V (trigeminal): ophthalmic, maxillary, and mandibular facial sensation- LINDA  CN VII (facial): Reduced  CN IX/X (glossopharyngeal/vagus): MPT: LINDA; pitch range: Reduced; vocal quality: hoarse; cough: Weak- perceptually  CN XII (hypoglossal): Reduced    Laryngeal function exam:   Secretions: dry lingual surface  Vocal quality: See CN exam above  MPT: See CN exam above  Pitch range: See CN exam above  Cough: See CN exam above    Oral Care Status:    [] Oral Care WFL  [x] Poor oral care status  [] Edentulous  [] Upper Dentures  [] Lower Dentures  [x] Missing/Broken Teeth  [x] Evidence of dental cavities/carries    PO trials:   IDDSI 0 (thin): Assessed via tsp (5cc), cup, and straw: anterior bolus loss, suspect premature bolus loss into pharynx, no clinical s/s of aspiration, vitals stable, and increased WOB    IDDSI 4 (puree): no anterior bolus loss , suspect reduced/impaired A-P bolus transit, oral clearance grossly

## 2024-12-12 NOTE — PROGRESS NOTES
Occupational Therapy  [address]  Occupational Therapy Initial Assessment    Name: Terri Smith  : 1940  MRN: [id number]  Date of Service: 2024    Discharge Recommendations:  Subacute/Skilled Nursing Facility     Therapy discharge recommendations take into account each patient's current medical complexities and are subject to input/oversight from the patient's healthcare team.   Barriers to Home Discharge:   [x] Steps to access home entry or bed/bath:   [x] Unable to transfer, ambulate, or propel wheelchair household distances without assist   [x] Limited available assist at home upon discharge    [x] Patient or family requests d/c to post-acute facility    [x] Poor cognition/safety awareness for d/c to home alone   [] Unable to maintain ordered weight bearing status    [] Patient with salient signs of long-standing immobility   [x] Other: Decreased IND with ADLs       Patient Diagnosis(es): The primary encounter diagnosis was Pneumonia due to infectious organism, unspecified laterality, unspecified part of lung. Diagnoses of Shortness of breath and Septicemia ([facility]) were also pertinent to this visit.  Past Medical History:  has a past medical history of NADJA (acute kidney injury) ([facility]), Asthma, Atrial fibrillation with RVR (HCC), CAD (coronary artery disease), CHF (congestive heart failure) (HCC), Chronic anxiety, COPD (chronic obstructive pulmonary disease) (HCC), COPD (chronic obstructive pulmonary disease) (HCC), GERD (gastroesophageal reflux disease), Heart attack (HCC), History of blood transfusion, Hyperlipidemia, Hypertension, MRSA (methicillin resistant staph aureus) culture positive, OA (osteoarthritis), and Thyroid disease.  Past Surgical History:  has a past surgical history that includes  section; Mastectomy, partial (Left); bronchoscopy (2019); Cardiac catheterization (2019); Coronary artery bypass graft (N/A, 2019); Colonoscopy (N/A,  guard assistance  Bed Mobility Comments: HOB 30 degrees, bedrail, extra time  Transfers  Transfer Comments: pt declined standing attempts due to fatigue, weakness, not feeling well requiring return to supine after 15 seconds seated EOB  Vision - Basic Assessment  Prior Vision: Other (add comment) (pt reports poor vision with c/o blurriness which pt states is baseline)  Visual History: Cataracts  Patient Visual Report: Blurring of vision when changing focal distance;Difficulty maintaining concentration with focus;Unable to keep objects in focus;Blurring of print when reading  Visual Field Cut: No  Oculo Motor Control: WNL  Vision  Vision: Impaired (pt/family report very poor vision that was not improved since her cataract surgery, pt mainly struggles to see things far away)  Hearing  Hearing: Within functional limits  Cognition  Overall Cognitive Status: Exceptions  Arousal/Alertness: Appears intact  Following Commands: Follows one step commands with repetition;Follows one step commands with increased time  Attention Span: Attends with cues to redirect;Difficulty dividing attention  Memory: Decreased recall of recent events  Safety Judgement: Decreased awareness of need for safety;Decreased awareness of need for assistance  Problem Solving: Assistance required to identify errors made;Assistance required to generate solutions  Initiation: Requires cues for some  Sequencing: Requires cues for some  Orientation  Overall Orientation Status: Impaired  Orientation Level: Oriented to place;Disoriented to time;Oriented to person;Oriented to situation                  Education Given To: Patient;Family  Education Provided: Role of Therapy;Plan of Care;Orientation;ADL Adaptive Strategies;Transfer Training;Fall Prevention Strategies;Energy Conservation;Mobility Training  Education Provided Comments: home safety concerns, OT discharge recommendations, bed mobility, sitting balance, importance of OOB activity.  Education Method:

## 2024-12-12 NOTE — CONSULTS
Electrophysiology Consultation   Date: 12/12/2024  Admit Date:  12/11/2024  Reason for Consultation: Sinus tachycardia  Consult Requesting Physician: Zaki Fermin MD     Chief Complaint   Patient presents with    Shortness of Breath     Patient from [facility], called for SOB, staff reported O2 sat in the 60's, however EMS reports 100% on RA. Per pt's son pt more lethargic and less conversive than usual but is at baseline mental status. Pt wears 2-3L NC at baseline for CHF and COPD     HPI:   Mrs. Smith is a pleasant 84 year old female with a medical history significant for severe COPD, ischemic cardiomyopathy status post CABG, history of GI bleeding, anemia, paroxysmal atrial fibrillation, chronic renal insufficiency, hypertension, and hyperlipidemia who presents from home with acute respiratory failure secondary to pneumonia and altered mental status whose clinical course has been complicated by sinus tachycardia.  Patient unable to give history however according to her son, she became acutely encephalopathic yesterday.  Her oxygen saturation dropped down to low 80s and so she was brought to [facility].    Patient was recently discharged on 12/06/2024 after presenting with hypotension, and melena.      Patient denies fevers, chest pain, orthopnea, PND, lower extremity edema, abdominal swelling, shortness of breath, dyspnea on exertion, chills, visual changes, headaches, sore throat, cough, abdominal pain, nausea, vomiting, bleeding, bruising, dysuria, muscle/joint pain, confusion, depression, anxiety, skin lesions, etc.    Emergency Room/Hospital Course:  Patient was evaluated in the ER on 12/11/2024.  Her chemistry was notable for elevated creatinine at 1.3, elevated pro calcitonin, and elevated BNP.  Her CBC found leukocytosis and stable normocytic anemia.  Her EKG showed sinus tachycardia.  His chest radiograph was concerning for pulmonary edema and interstitial pneumonia.  Due to his sinus  Patient is tachycardic with sinus tachycardia.  Her sinus tachycardia is multifactorial including pneumonia/SIRS combined with anemia, COPD exacerbation, and volume overload.  No true underlying arrhythmia.  We would recommend treating underlying causes focusing on her pneumonia as her anemia is stable and chronic with out chest pain.  We would not suggest aggressive suppression as natural response to infection.  We would also suggest gentle diuresis given history of hypotension during recent admission.  We would suggesting keeping her on her home metoprolol with goal HR < 140.  Electrophysiology will sign off cause however we will remain available to assist in any way.  - Start/continue when able furosemide 20 mg daily.  - Continue metoprolol tartrate 12.5 mg BID.  - Treat underlying causes: pneumonia and COPD.  - Goal HR while SIRs < 140.  - Electrophysiology will sign off case.    2. Paroxysmal atrial fibrillation ([id number] score 6).  No recurrence despite respiratory distress and pneumonia.  - Continue apixaban.  - Continue metoprolol tartrate 12.5 mg BID.    3. Ischemic cardiomyopathy status post CABG.  Stable.    4. Heart failure with preserved ejection fraction.  - Plan as per above.    5. Pneumonia.  - Per primary team.    Due to complex nature of patient's disease process, medication adjustments and education I spent 80 minutes with patient care.    Thank you for allowing me to participate in the care of Terri DANIEL Luis . If you have any questions/comments, please do not hesitate to contact us.    Denis Roche MD  Cardiac Electrophysiology  [facility]  [phone] [place] Office

## 2024-12-12 NOTE — TELEPHONE ENCOUNTER
(I am covering for Elham Leonard CNP who is away from the office today.)   Patient currently readmitted.  Go ahead with scheduling for the second week in January.  That we will have to do.  Thank you, Mahi

## 2024-12-12 NOTE — ACP (ADVANCE CARE PLANNING)
Advance Care Planning     Palliative Team Advance Care Planning (ACP) Conversation    Date of Conversation: 12/12/24    Individuals present for the conversation: Unsuccessful attempt to reach dtr Juan, seen by [facility] NP previously, Aug 2023, at which time pt was expressing goals for life prolongation.  At that time, a referral had been given to [facility] but pt didn't enroll.     ACP documents on file prior to discussion:  -None    Previously completed document/s not on file: Patient / participant reports that there are no previously executed ACP documents.    Healthcare Decision Maker:    Primary Decision Maker: Juan Smith - Child - [phone]    Secondary Decision Maker: Hafsa Tapia - Grandchild - [phone]    Secondary Decision Maker: Raphael Smith - Child - [phone]     Conversation Summary: Pt admitted from [facility], has had multiple readmissions.  Writer unable to leave message when dtr called though it sounded as though the phone had been answered but no verbal response.  Rec'd return call from Juan but, when when writer answered and identified self, caller hung up.  Will cont efforts to connect w/ family. On prior admission, although pt hadn't executed AD's, she did indicate her wish that dtr be primary contact.     Resuscitation Status:   Code Status: Full Code     Documentation Completed:  -No new documents completed.    Symptom Assessment:         Symptoms       Present Y/N          Medications Doses in last    24 hours    Pain Singular assessment of pain this admission thus far, rated 6. Neurontin 100mg 3x/day  Acetaminophen 650mg Q6 PRN     Scheduled                            0    N/V      Date of last BM           12/12 Colace 100mg daily  Glycolax 17g daily PRN      Scheduled              0    Anxiety/agitation  Buspar 10mg q6 PRN             0    Depression  Lexapro 10mg daily       Scheduled    SOB       4L/nc      Appetite       Sleep Currently NPO w/ low vol  pleasure feeds.  BMI 17.9. Dietician saw early Dec.      Performance Status D/c to [facility] last adm             I spent 40 minutes with the patient and/or surrogate decision maker discussing the patient's wishes and goals.                                                                                                                                                                                                                                    Andrade Cruz RN

## 2024-12-12 NOTE — PLAN OF CARE
Problem: Confusion  Goal: Confusion, delirium, dementia, or psychosis is improved or at baseline  Description: INTERVENTIONS:  1. Assess for possible contributors to thought disturbance, including medications, impaired vision or hearing, underlying metabolic abnormalities, dehydration, psychiatric diagnoses, and notify attending LIP  2. [place] high risk fall precautions, as indicated  3. Provide frequent short contacts to provide reality reorientation, refocusing and direction  4. Decrease environmental stimuli, including noise as appropriate  5. Monitor and intervene to maintain adequate nutrition, hydration, elimination, sleep and activity  6. If unable to ensure safety without constant attention obtain sitter and review sitter guidelines with assigned personnel  7. Initiate Psychosocial CNS and Spiritual Care consult, as indicated  Outcome: Not Progressing     Problem: Confusion  Goal: Confusion, delirium, dementia, or psychosis is improved or at baseline  Description: INTERVENTIONS:  1. Assess for possible contributors to thought disturbance, including medications, impaired vision or hearing, underlying metabolic abnormalities, dehydration, psychiatric diagnoses, and notify attending LIP  2. [place] high risk fall precautions, as indicated  3. Provide frequent short contacts to provide reality reorientation, refocusing and direction  4. Decrease environmental stimuli, including noise as appropriate  5. Monitor and intervene to maintain adequate nutrition, hydration, elimination, sleep and activity  6. If unable to ensure safety without constant attention obtain sitter and review sitter guidelines with assigned personnel  7. Initiate Psychosocial CNS and Spiritual Care consult, as indicated  Outcome: Not Progressing

## 2024-12-12 NOTE — CONSULTS
Palliative Care Initial Note  Palliative Care Admit date: 12/12/24    ACP/palcare referral noted

## 2024-12-12 NOTE — CARE COORDINATION
Cm update LOS 1- Pt is from [facility]  SNF with AMS/CHF. Pt on O2 @ 3l/nc. UTI, Cards consulted. Left msg with  Tae at [facility] to check if there are any barriers to return.

## 2024-12-13 LAB
ALBUMIN SERPL-MCNC: 3.1 G/DL (ref 3.4–5)
ALBUMIN/GLOB SERPL: 0.9 {RATIO} (ref 1.1–2.2)
ALP SERPL-CCNC: 78 U/L (ref 40–129)
ALT SERPL-CCNC: <5 U/L (ref 10–40)
ANION GAP SERPL CALCULATED.3IONS-SCNC: 18 MMOL/L (ref 3–16)
AST SERPL-CCNC: 22 U/L (ref 15–37)
BASOPHILS # BLD: 0.1 K/UL (ref 0–0.2)
BASOPHILS NFR BLD: 0.6 %
BILIRUB SERPL-MCNC: 0.3 MG/DL (ref 0–1)
BUN SERPL-MCNC: 18 MG/DL (ref 7–20)
CALCIUM SERPL-MCNC: 10.5 MG/DL (ref 8.3–10.6)
CHLORIDE SERPL-SCNC: 96 MMOL/L (ref 99–110)
CO2 SERPL-SCNC: 29 MMOL/L (ref 21–32)
CREAT SERPL-MCNC: 0.8 MG/DL (ref 0.6–1.2)
DEPRECATED RDW RBC AUTO: 14.5 % (ref 12.4–15.4)
EKG ATRIAL RATE: 95 BPM
EKG DIAGNOSIS: NORMAL
EKG P AXIS: 83 DEGREES
EKG P-R INTERVAL: 142 MS
EKG Q-T INTERVAL: 348 MS
EKG QRS DURATION: 76 MS
EKG QTC CALCULATION (BAZETT): 437 MS
EKG R AXIS: -67 DEGREES
EKG T AXIS: 72 DEGREES
EKG VENTRICULAR RATE: 95 BPM
EOSINOPHIL # BLD: 0 K/UL (ref 0–0.6)
EOSINOPHIL NFR BLD: 0.1 %
GFR SERPLBLD CREATININE-BSD FMLA CKD-EPI: 73 ML/MIN/{1.73_M2}
GLUCOSE SERPL-MCNC: 89 MG/DL (ref 70–99)
HCT VFR BLD AUTO: 31.1 % (ref 36–48)
HGB BLD-MCNC: 10 G/DL (ref 12–16)
LYMPHOCYTES # BLD: 0.8 K/UL (ref 1–5.1)
LYMPHOCYTES NFR BLD: 8.4 %
MCH RBC QN AUTO: 30.5 PG (ref 26–34)
MCHC RBC AUTO-ENTMCNC: 32.1 G/DL (ref 31–36)
MCV RBC AUTO: 95.1 FL (ref 80–100)
MONOCYTES # BLD: 0.4 K/UL (ref 0–1.3)
MONOCYTES NFR BLD: 3.8 %
MRSA SPEC QL CULT: ABNORMAL
NEUTROPHILS # BLD: 8.6 K/UL (ref 1.7–7.7)
NEUTROPHILS NFR BLD: 87.1 %
ORGANISM: ABNORMAL
PLATELET # BLD AUTO: 468 K/UL (ref 135–450)
PMV BLD AUTO: 7.3 FL (ref 5–10.5)
POTASSIUM SERPL-SCNC: 3.7 MMOL/L (ref 3.5–5.1)
PROT SERPL-MCNC: 6.4 G/DL (ref 6.4–8.2)
RBC # BLD AUTO: 3.27 M/UL (ref 4–5.2)
SODIUM SERPL-SCNC: 143 MMOL/L (ref 136–145)
WBC # BLD AUTO: 9.8 K/UL (ref 4–11)

## 2024-12-13 PROCEDURE — 94761 N-INVAS EAR/PLS OXIMETRY MLT: CPT

## 2024-12-13 PROCEDURE — 2580000003 HC RX 258: Performed by: INTERNAL MEDICINE

## 2024-12-13 PROCEDURE — 2060000000 HC ICU INTERMEDIATE R&B

## 2024-12-13 PROCEDURE — 2700000000 HC OXYGEN THERAPY PER DAY

## 2024-12-13 PROCEDURE — 93005 ELECTROCARDIOGRAM TRACING: CPT

## 2024-12-13 PROCEDURE — 6360000002 HC RX W HCPCS: Performed by: INTERNAL MEDICINE

## 2024-12-13 PROCEDURE — 94660 CPAP INITIATION&MGMT: CPT

## 2024-12-13 PROCEDURE — 6360000002 HC RX W HCPCS

## 2024-12-13 PROCEDURE — 6370000000 HC RX 637 (ALT 250 FOR IP): Performed by: INTERNAL MEDICINE

## 2024-12-13 PROCEDURE — 2580000003 HC RX 258

## 2024-12-13 PROCEDURE — 6360000002 HC RX W HCPCS: Performed by: NURSE PRACTITIONER

## 2024-12-13 PROCEDURE — 85025 COMPLETE CBC W/AUTO DIFF WBC: CPT

## 2024-12-13 PROCEDURE — 97110 THERAPEUTIC EXERCISES: CPT

## 2024-12-13 PROCEDURE — 94640 AIRWAY INHALATION TREATMENT: CPT

## 2024-12-13 PROCEDURE — 6370000000 HC RX 637 (ALT 250 FOR IP)

## 2024-12-13 PROCEDURE — 92526 ORAL FUNCTION THERAPY: CPT

## 2024-12-13 PROCEDURE — 93010 ELECTROCARDIOGRAM REPORT: CPT | Performed by: INTERNAL MEDICINE

## 2024-12-13 PROCEDURE — 97530 THERAPEUTIC ACTIVITIES: CPT

## 2024-12-13 PROCEDURE — 97535 SELF CARE MNGMENT TRAINING: CPT

## 2024-12-13 PROCEDURE — 80053 COMPREHEN METABOLIC PANEL: CPT

## 2024-12-13 RX ORDER — VANCOMYCIN 1 G/200ML
1000 INJECTION, SOLUTION INTRAVENOUS ONCE
Status: COMPLETED | OUTPATIENT
Start: 2024-12-13 | End: 2024-12-13

## 2024-12-13 RX ORDER — FUROSEMIDE 20 MG/1
20 TABLET ORAL DAILY
Status: DISCONTINUED | OUTPATIENT
Start: 2024-12-13 | End: 2024-12-14

## 2024-12-13 RX ORDER — VANCOMYCIN 1 G/200ML
1000 INJECTION, SOLUTION INTRAVENOUS EVERY 12 HOURS
Status: CANCELLED | OUTPATIENT
Start: 2024-12-13

## 2024-12-13 RX ADMIN — SODIUM CHLORIDE, PRESERVATIVE FREE 10 ML: 5 INJECTION INTRAVENOUS at 20:25

## 2024-12-13 RX ADMIN — ALBUTEROL SULFATE 2.5 MG: 2.5 SOLUTION RESPIRATORY (INHALATION) at 11:57

## 2024-12-13 RX ADMIN — FENTANYL CITRATE 25 MCG: 50 INJECTION INTRAMUSCULAR; INTRAVENOUS at 03:17

## 2024-12-13 RX ADMIN — AZITHROMYCIN 500 MG: 250 TABLET, FILM COATED ORAL at 12:57

## 2024-12-13 RX ADMIN — ALBUTEROL SULFATE 2.5 MG: 2.5 SOLUTION RESPIRATORY (INHALATION) at 07:46

## 2024-12-13 RX ADMIN — METOPROLOL TARTRATE 12.5 MG: 25 TABLET, FILM COATED ORAL at 09:46

## 2024-12-13 RX ADMIN — FENTANYL CITRATE 25 MCG: 50 INJECTION INTRAMUSCULAR; INTRAVENOUS at 00:05

## 2024-12-13 RX ADMIN — BUDESONIDE 500 MCG: 0.5 SUSPENSION RESPIRATORY (INHALATION) at 07:46

## 2024-12-13 RX ADMIN — GABAPENTIN 100 MG: 100 CAPSULE ORAL at 12:57

## 2024-12-13 RX ADMIN — GABAPENTIN 100 MG: 100 CAPSULE ORAL at 09:46

## 2024-12-13 RX ADMIN — WATER 1000 MG: 1 INJECTION INTRAMUSCULAR; INTRAVENOUS; SUBCUTANEOUS at 12:57

## 2024-12-13 RX ADMIN — ALBUTEROL SULFATE 2.5 MG: 2.5 SOLUTION RESPIRATORY (INHALATION) at 20:34

## 2024-12-13 RX ADMIN — VANCOMYCIN 1000 MG: 1 INJECTION, SOLUTION INTRAVENOUS at 13:15

## 2024-12-13 RX ADMIN — BUDESONIDE 500 MCG: 0.5 SUSPENSION RESPIRATORY (INHALATION) at 20:35

## 2024-12-13 RX ADMIN — FUROSEMIDE 20 MG: 20 TABLET ORAL at 13:18

## 2024-12-13 RX ADMIN — ALBUTEROL SULFATE 2.5 MG: 2.5 SOLUTION RESPIRATORY (INHALATION) at 15:37

## 2024-12-13 RX ADMIN — GUAIFENESIN 600 MG: 600 TABLET ORAL at 09:47

## 2024-12-13 RX ADMIN — METOPROLOL TARTRATE 12.5 MG: 25 TABLET, FILM COATED ORAL at 19:48

## 2024-12-13 RX ADMIN — ALBUTEROL SULFATE 2.5 MG: 2.5 SOLUTION RESPIRATORY (INHALATION) at 23:39

## 2024-12-13 RX ADMIN — ROFLUMILAST 500 MCG: 500 TABLET ORAL at 09:59

## 2024-12-13 RX ADMIN — GABAPENTIN 100 MG: 100 CAPSULE ORAL at 19:48

## 2024-12-13 RX ADMIN — VANCOMYCIN HYDROCHLORIDE 500 MG: 500 INJECTION, POWDER, LYOPHILIZED, FOR SOLUTION INTRAVENOUS at 23:30

## 2024-12-13 RX ADMIN — ESCITALOPRAM OXALATE 10 MG: 10 TABLET ORAL at 09:46

## 2024-12-13 RX ADMIN — PANTOPRAZOLE SODIUM 40 MG: 40 TABLET, DELAYED RELEASE ORAL at 05:51

## 2024-12-13 RX ADMIN — LEVOTHYROXINE SODIUM 25 MCG: 0.03 TABLET ORAL at 05:51

## 2024-12-13 RX ADMIN — SODIUM CHLORIDE 10 ML: 9 INJECTION, SOLUTION INTRAVENOUS at 13:14

## 2024-12-13 RX ADMIN — ONDANSETRON 4 MG: 2 INJECTION INTRAMUSCULAR; INTRAVENOUS at 21:54

## 2024-12-13 RX ADMIN — ACETAMINOPHEN 650 MG: 325 TABLET ORAL at 16:40

## 2024-12-13 RX ADMIN — ATORVASTATIN CALCIUM 40 MG: 40 TABLET, FILM COATED ORAL at 19:48

## 2024-12-13 ASSESSMENT — PAIN SCALES - GENERAL
PAINLEVEL_OUTOF10: 3
PAINLEVEL_OUTOF10: 0
PAINLEVEL_OUTOF10: 3
PAINLEVEL_OUTOF10: 10

## 2024-12-13 ASSESSMENT — PAIN DESCRIPTION - DESCRIPTORS: DESCRIPTORS: TENDER

## 2024-12-13 ASSESSMENT — PAIN DESCRIPTION - LOCATION: LOCATION: ABDOMEN

## 2024-12-13 ASSESSMENT — PAIN SCALES - WONG BAKER: WONGBAKER_NUMERICALRESPONSE: NO HURT

## 2024-12-13 NOTE — PROGRESS NOTES
V2.0    [facility] Progress Note      Name:  Terri Smith /Age/Sex: 1940  (84 y.o. female)   MRN & CSN:  [id number] & [id number] Encounter Date/Time: 2024 9:51 AM EST   Location:  044 PCP: Jackson Ontiveros MD     Attending:Zaki Fermin MD       Hospital Day: 3    Assessment and Recommendations   Terri Smith is a 84 y.o. female with pmh of atrial fibrillation, CAD, CHF, COPD, GERD, hypothyroidism who presents with Pneumonia, unspecified organism and pulmonary edema.    Interval history  Patient seen and examined on bedside this morning, she was conscious oriented and alert.  Responding well to all conversation.  She denied having any chest pain or any other acute complaints today.  She reported feeling better.     Plan:    Hypoxic and hypercapnic respiratory failure.    Patient requiring increased O2.      continue mucoid nebulization.  Probably secondary to underlying pneumonia.    Continue Roflumilast  underlying COPD.    may require BiPAP if breathing worsens.   Consult pulmonary, Initial blood gas consistent with respiratory acidosis, repeat > improved   Community-acquired pneumonia.   CXR > airspace opacities  Continue IV ceftriaxone and azithromycin.   Repeat chest x-ray showed progression of airspace opacities  MRSA screen.,  Positive  IV vancomycin started today with pharmacy consult  Monitor leukocytosis. white count 15.1.> 11.2  Paroxysmal atrial fibrillation with rapid ventricular response.  Metoprolol and Eliquis at home  Continue Eliquis at home dosage  Patient given a dose of diltiazem.    Continue telemetry   patient may need Lopressor drip if heart rate remains elevated  Pulmonary 73 on admission, recheck today  Repeat EKG this morning showed tachycardia 125.    cardiology consulted > advised to continue current regime and start furosemide 20 mg once OD.  sinus tachycardia is multifactorial including pneumonia/SIRS combined with anemia, COPD exacerbation,  12/13/24  0521   * 146* 143   K 5.3* 5.0 3.7    102 96*   CO2 34* 27 29   BUN 18 21* 18   CREATININE 1.3* 1.1 0.8   GLUCOSE 94 82 89     Hepatic:   Recent Labs     12/11/24  1119 12/12/24  0549 12/13/24  0521   AST 18 19 22   ALT 8* <5* <5*   BILITOT <0.2 <0.2 0.3   ALKPHOS 89 83 78     Lipids:   Lab Results   Component Value Date/Time    CHOL 187 11/22/2024 06:02 AM    HDL 76 11/22/2024 06:02 AM    TRIG 110 11/22/2024 06:02 AM     Hemoglobin A1C:   Lab Results   Component Value Date/Time    LABA1C 5.3 02/02/2024 07:32 PM     TSH:   Lab Results   Component Value Date/Time    TSH 0.20 12/12/2024 05:49 AM     Troponin: No results found for: \"TROPONINT\"  Lactic Acid:   Recent Labs     12/11/24  1119   LACTA 0.7     BNP:   Recent Labs     12/11/24  1119   PROBNP 2,778*     UA:  Lab Results   Component Value Date/Time    NITRU Negative 12/01/2024 10:46 AM    COLORU Yellow 12/01/2024 10:46 AM    PHUR 6.0 12/01/2024 10:46 AM    PHUR 7.0 12/15/2023 06:26 PM    WBCUA 0-2 10/11/2024 11:45 PM    RBCUA 0-2 10/11/2024 11:45 PM    MUCUS Rare 08/30/2023 01:50 PM    YEAST Present 10/12/2021 04:05 AM    BACTERIA 2+ 08/30/2023 01:50 PM    CLARITYU Clear 12/01/2024 10:46 AM    SPECGRAV <1.005 05/07/2013 01:35 PM    LEUKOCYTESUR Negative 12/01/2024 10:46 AM    UROBILINOGEN 0.2 12/01/2024 10:46 AM    BILIRUBINUR Negative 12/01/2024 10:46 AM    BLOODU Negative 12/01/2024 10:46 AM    GLUCOSEU Negative 12/01/2024 10:46 AM    KETUA Negative 12/01/2024 10:46 AM    AMORPHOUS 2+ 07/05/2023 06:11 PM     Urine Cultures:   Lab Results   Component Value Date/Time    LABURIN  08/30/2023 02:09 PM     <10,000 CFU/ml mixed skin/urogenital milad. No further workup     Blood Cultures:   Lab Results   Component Value Date/Time    BC  12/11/2024 11:21 AM     No Growth to date.  Any change in status will be called.     Lab Results   Component Value Date/Time    BLOODCULT2  12/11/2024 11:21 AM     No Growth to date.  Any change in status will be

## 2024-12-13 NOTE — PLAN OF CARE
Problem: Chronic Conditions and Co-morbidities  Goal: Patient's chronic conditions and co-morbidity symptoms are monitored and maintained or improved  Outcome: Progressing     Problem: Discharge Planning  Goal: Discharge to home or other facility with appropriate resources  Outcome: Progressing     Problem: Safety - Adult  Goal: Free from fall injury  Outcome: Progressing     Problem: Pain  Goal: Verbalizes/displays adequate comfort level or baseline comfort level  Outcome: Progressing     Problem: Skin/Tissue Integrity  Goal: Absence of new skin breakdown  Description: 1.  Monitor for areas of redness and/or skin breakdown  2.  Assess vascular access sites hourly  3.  Every 4-6 hours minimum:  Change oxygen saturation probe site  4.  Every 4-6 hours:  If on nasal continuous positive airway pressure, respiratory therapy assess nares and determine need for appliance change or resting period.  Outcome: Progressing     Problem: ABCDS Injury Assessment  Goal: Absence of physical injury  Outcome: Progressing     Problem: Confusion  Goal: Confusion, delirium, dementia, or psychosis is improved or at baseline  Description: INTERVENTIONS:  1. Assess for possible contributors to thought disturbance, including medications, impaired vision or hearing, underlying metabolic abnormalities, dehydration, psychiatric diagnoses, and notify attending LIP  2. [place] high risk fall precautions, as indicated  3. Provide frequent short contacts to provide reality reorientation, refocusing and direction  4. Decrease environmental stimuli, including noise as appropriate  5. Monitor and intervene to maintain adequate nutrition, hydration, elimination, sleep and activity  6. If unable to ensure safety without constant attention obtain sitter and review sitter guidelines with assigned personnel  7. Initiate Psychosocial CNS and Spiritual Care consult, as indicated  Outcome: Progressing     Problem: Safety - Medical Restraint  Goal:

## 2024-12-13 NOTE — PROGRESS NOTES
CHF Care Plan      Patient's EF (Ejection Fraction) is greater than 40%    Heart Failure Medications:  Diuretics:: Torsemide    (One of the following REQUIRED for EF </= 40%/SYSTOLIC FAILURE but MAY be used in EF% >40%/DIASTOLIC FAILURE)        ACE:: None        ARB:: None         ARNI:: None    (Beta Blockers)  NON- Evidenced Based Beta Blocker (for EF% >40%/DIASTOLIC FAILURE): Metoprolol TARTrate- Lopressor    Evidenced Based Beta Blocker::(REQUIRED for EF% <40%/SYSTOLIC FAILURE) None  ...................................................................................................................................................    Failed to redirect to the Timeline version of the [place] Smart[employer].      Patient's weights and intake/output reviewed    Daily Weight log at bedside, patient/family participation in use of log: \"yes    Patient's current weight today shows a difference of 7 lbs less than last documented weight. (Bedscale)      Intake/Output Summary (Last 24 hours) at 12/13/2024 0508  Last data filed at 12/13/2024 0535  Gross per 24 hour   Intake 360 ml   Output 1000 ml   Net -640 ml       Education Booklet Provided: yes    Comorbidities Reviewed Yes    Patient has a past medical history of NADJA (acute kidney injury) ([facility]), Asthma, Atrial fibrillation with RVR ([facility]), CAD (coronary artery disease), CHF (congestive heart failure) ([facility]), Chronic anxiety, COPD (chronic obstructive pulmonary disease) (HCC), COPD (chronic obstructive pulmonary disease) (HCC), GERD (gastroesophageal reflux disease), Heart attack (HCC), History of blood transfusion, Hyperlipidemia, Hypertension, MRSA (methicillin resistant staph aureus) culture positive, OA (osteoarthritis), and Thyroid disease.     >>For CHF and Comorbidity documentation on Education Time and Topics, please see Education Tab      CHF Education    Learners:  Patient  Readineess:   Acceptance  Method:   Explanation, Demonstration, Handout, , Video,  Class/Group, and Teachback  Response:    Verbalizes Understanding and Needs Reinforcement    Comments:     Time Spent: 10 mins      Pt resting in bed at this time on  3 L O2. Pt denies shortness of breath. Pt without lower extremity edema.     Patient and/or Family's stated Goal of Care this Admission: reduce shortness of breath, increase activity tolerance, better understand heart failure and disease management, be more comfortable, and reduce lower extremity edema prior to discharge        :

## 2024-12-13 NOTE — PROGRESS NOTES
12/13/24 0007   NIV Type   $NIV $Daily Charge   NIV Started/Stopped On   Equipment Type v60   Mode Bilevel   Mask Type Full face mask   Mask Size Small   Assessment   Comfort Level Good   Using Accessory Muscles No   Mask Compliance Good   Skin Assessment Clean, dry, & intact   Skin Protection for O2 Device Yes   Orientation Middle;Anterior   Location Cheek;Nose   Intervention(s) Skin Barrier   Settings/Measurements   PIP Observed 12 cm H20   IPAP 12 cmH20   CPAP/EPAP 6 cmH2O   Vt (Measured) 341 mL   Rate Ordered 8   Insp Rise Time (%) 3 %   FiO2  40 %   I Time/ I Time % 1 s   Minute Volume (L/min) 11.7 Liters   Mask Leak (lpm) 10 lpm   Patient's Home Machine No   Alarm Settings   Alarms On Y   Low Pressure (cmH2O) 6 cmH2O   High Pressure (cmH2O) 30 cmH2O   Apnea (secs) 20 secs   RR Low (bpm) 6   RR High (bpm) 45 br/min

## 2024-12-13 NOTE — CARE COORDINATION
CM update LOS 2- Pt is from [facility] SNF- Mentation is improved. GI is following. Eliquis on hold. No signs of bleeding. IV anbx for PNA. Will follow and plan for return to [facility].

## 2024-12-13 NOTE — PROGRESS NOTES
Occupational Therapy  Facility/Department: [facility] C4 PCU  Daily Treatment Note  NAME: Terri Smith  : 1940  MRN: [id number]    Date of Service: 2024    Discharge Recommendations:  Subacute/Skilled Nursing Facility  OT Equipment Recommendations  Equipment Needed: No    Therapy discharge recommendations are subject to collaboration from the patient’s interdisciplinary healthcare team, including MD and case management recommendations.     Barriers to Home Discharge:   [x] Steps to access home entry or bed/bath: 1 DIANE without handrails   [x] Unable to transfer, ambulate, or propel wheelchair household distances without assist   [] Limited available assist at home upon discharge    [x] Patient or family requests d/c to post-acute facility    [] Poor cognition/safety awareness for d/c to home alone    [] Unable to maintain ordered weight bearing status    [] Patient with salient signs of long-standing immobility   [x] Decreased independence with ADLs   [x] Increased risk for falls     If pt is unable to be seen after this session, please let this note serve as discharge summary.  Please see case management note for discharge disposition.  Thank you.  Patient Diagnosis(es): The primary encounter diagnosis was Pneumonia due to infectious organism, unspecified laterality, unspecified part of lung. Diagnoses of Shortness of breath and Septicemia (HCC) were also pertinent to this visit.     Assessment   Assessment: Pt seen for follow up OT/PT session this date. Co-treatment utilized to maximize safety and utilize the skill of two skilled therapists.  Pt upon arrival with PCA performing pericare in bed 2/2 soiled with BM. Pt total A for pericare and LB dressing. Upon sitting EOB with  Min A pt requesting need for further toileting. [facility] to [facility] with further BM, required Max A to perform pericare and CM in stance. Pt returned to bed 2/2 increased fatigue and labored breathing during activity. Pt would  Training  Education Provided Comments: Pt educated on importance of OOB mobility, prevention of complications of bedrest, and general safety during hospitalization  Education Method: Demonstration;Verbal  Barriers to Learning: Cognition;Vision  Education Outcome: Verbalized understanding;Demonstrated understanding;Continued education needed    Goals  Short Term Goals  Time Frame for Short Term Goals: 2 weeks (12/26/24) - goals ongoing 12/13  Short Term Goal 1: Tolerate sitting EOB supervision x10 minutes in preparation for engagement in ADLs.  Short Term Goal 2: Mod A toileting  Short Term Goal 3: Mod A functional transfers to/from EOB, chair, toilet/BSC  Short Term Goal 4: SBA grooming seated EOB  Short Term Goal 5: Mod A total body dressing  Patient Goals   Patient goals : to be able to get out of bed on her own    AM-PAC - ADL  AM-PAC Daily Activity - Inpatient   How much help is needed for putting on and taking off regular lower body clothing?: Total  How much help is needed for bathing (which includes washing, rinsing, drying)?: Total  How much help is needed for toileting (which includes using toilet, bedpan, or urinal)?: Total  How much help is needed for putting on and taking off regular upper body clothing?: A Lot  How much help is needed for taking care of personal grooming?: A Little  How much help for eating meals?: A Little  AM-PAC Inpatient Daily Activity Raw Score: 11  AM-PAC Inpatient ADL T-Scale Score : 29.04  ADL Inpatient CMS 0-100% Score: 70.42  ADL Inpatient CMS G-Code Modifier : CL    Therapy Time   Individual Concurrent Group Co-treatment   Time In 0755         Time Out 0833         Minutes 38         Timed Code Treatment Minutes: 38 Minutes       Arely Gonzalez OT

## 2024-12-13 NOTE — PLAN OF CARE
Problem: Chronic Conditions and Co-morbidities  Goal: Patient's chronic conditions and co-morbidity symptoms are monitored and maintained or improved  12/13/2024 0210 by Nila Phillips RN  Outcome: Progressing  12/12/2024 1950 by Harrison Santiago RN  Outcome: Progressing     Problem: Discharge Planning  Goal: Discharge to home or other facility with appropriate resources  12/13/2024 0210 by Nila Phillips RN  Outcome: Progressing  12/12/2024 1950 by Harrison Santiago RN  Outcome: Progressing     Problem: Safety - Adult  Goal: Free from fall injury  12/13/2024 0210 by Nila Phillips RN  Outcome: Progressing  12/12/2024 1950 by Harrison Santiago RN  Outcome: Progressing     Problem: Pain  Goal: Verbalizes/displays adequate comfort level or baseline comfort level  12/12/2024 1950 by Harrison Santiago RN  Outcome: Progressing     Problem: Skin/Tissue Integrity  Goal: Absence of new skin breakdown  Description: 1.  Monitor for areas of redness and/or skin breakdown  2.  Assess vascular access sites hourly  3.  Every 4-6 hours minimum:  Change oxygen saturation probe site  4.  Every 4-6 hours:  If on nasal continuous positive airway pressure, respiratory therapy assess nares and determine need for appliance change or resting period.  12/12/2024 1950 by Harrison Santiago RN  Outcome: Progressing     Problem: ABCDS Injury Assessment  Goal: Absence of physical injury  12/12/2024 1950 by Harrison Santiago RN  Outcome: Progressing     Problem: Confusion  Goal: Confusion, delirium, dementia, or psychosis is improved or at baseline  Description: INTERVENTIONS:  1. Assess for possible contributors to thought disturbance, including medications, impaired vision or hearing, underlying metabolic abnormalities, dehydration, psychiatric diagnoses, and notify attending LIP  2. [place] high risk fall precautions, as indicated  3. Provide frequent short contacts to provide reality reorientation, refocusing and direction  4. Decrease

## 2024-12-13 NOTE — PROGRESS NOTES
Patient had large black BM, NP Fito Barrett notified. Fecal occult sample sent to lab, awaiting results.

## 2024-12-13 NOTE — PROGRESS NOTES
Physical Therapy  Facility/Department: [facility] [address] [facility]  Daily Treatment Note  NAME: Terri Smith  : 1940  MRN: [id number]    Date of Service: 2024    Discharge Recommendations:  Subacute/Skilled Nursing Facility   PT Equipment Recommendations  Equipment Needed: No  Other: TBD at SNF    Patient Diagnosis(es): The primary encounter diagnosis was Pneumonia due to infectious organism, unspecified laterality, unspecified part of lung. Diagnoses of Shortness of breath and Septicemia (HCC) were also pertinent to this visit.    Assessment  Assessment: Pt porgressing with PT, able to stand and transfer bed > BSC and back to bed with min A but demos so much fatigue and ZENDEJAS that she retured to supine to rest afterwards.  Pt participated in BLE exs X 5 -10 reps in bed.  She is recommended for con't skilled PT and SNF at D/C.  Activity Tolerance: Patient tolerated evaluation without incident;Patient limited by fatigue;Patient limited by endurance  Equipment Needed: No  Other: TBD at SNF    Plan  Physical Therapy Plan  General Plan: 3-5 times per week  Specific Instructions for Next Treatment: Progress ther ex and mobility as tolerated  Current Treatment Recommendations: Strengthening;ROM;Balance training;Functional mobility training;Transfer training;Endurance training;Gait training;Home exercise program;Safety education & training;Patient/Caregiver education & training;Equipment evaluation, education, & procurement;Therapeutic activities;Co-Treatment;Positioning    Restrictions  Restrictions/Precautions  Restrictions/Precautions: Fall Risk, General Precautions, Contact Precautions  Position Activity Restriction  Other Position/Activity Restrictions: Contact precautions 2* MDRO, PureWick, telemetry, O2, up with assist     Subjective   Pain  Pre-Pain: 0  Post-Pain: 0  Orientation  Overall Orientation Status: Impaired  Orientation Level: Oriented to person;Disoriented to situation;Disoriented to time;Oriented to  place  Cognition  Overall Cognitive Status: Exceptions  Following Commands: Follows one step commands with repetition;Follows one step commands with increased time  Attention Span: Attends with cues to redirect;Difficulty dividing attention  Memory: Decreased recall of recent events  Safety Judgement: Decreased awareness of need for safety;Decreased awareness of need for assistance  Problem Solving: Assistance required to identify errors made;Assistance required to generate solutions  Initiation: Requires cues for some  Sequencing: Requires cues for some    Objective  Vitals     Bed Mobility Training  Bed Mobility Training: Yes  Interventions: Verbal cues;Visual cues;Tactile cues  Supine to Sit: Minimum assistance;Assist X1;Additional time;Adaptive equipment (to L with HOB elevated)  Sit to Supine: Minimum assistance;Assist X1;Additional time;Adaptive equipment  Scooting: Stand-by assistance;Additional time (to EOB)  Balance  Sitting: Impaired  Sitting - Static: Fair (occasional)  Sitting - Dynamic: Fair (occasional)  Standing: Impaired  Standing - Static: Constant support;Fair  Standing - Dynamic: Constant support;Fair  Transfer Training  Transfer Training: Yes  Interventions: Verbal cues;Safety awareness training  Sit to Stand: Minimum assistance;Assist X1;Additional time (HHA)  Stand to Sit: Minimum assistance;Assist X1;Additional time (HHA)  Stand Pivot Transfers: Minimum assistance;Additional time;Adaptive equipment (HHA)  Toilet Transfer: Minimum assistance;Assist X1;Additional time (HHA  bed > BSC > bed)  Gait  Gait Training: No     PT Exercises  Exercise Treatment: Supine exs: AP, heelslides, abd X 10     Safety Devices  Type of Devices: All fall risk precautions in place;Bed alarm in place;Call light within reach;Patient at risk for falls;Left in bed;Nurse notified;All danna prominences offloaded      Goals  Short Term Goals  Time Frame for Short Term Goals: 1 week, 12/19/24 (unless otherwise specified);

## 2024-12-13 NOTE — PROGRESS NOTES
Speech Language Pathology  Dysphagia Treatment/Follow-Up Note  Facility/Department: [facility] [address] [facility]    Recommendations:  Solid Consistency: IDDSI 7 Regular- Easy To Chew  Liquid Consistency: IDDSI 0 Thin Liquids - straws OK  Medication: Meds PO as tolerated  *Recommend close monitoring of respiratory status- hold PO and re-consult SLP if s/s aspiration or worsening respiratory status develops   Risk Management: upright for all intake, stay upright for at least 30 mins after intake, small bites/sips, assist feed, 1:1 supervision with intake, oral care 2-3x/day to reduce adverse affects in the event of aspiration, increase physical mobility as able, alternate bites/sips, slow rate of intake, general GERD precautions, general aspiration precautions, and hold PO and contact SLP if s/s of aspiration or worsening respiratory status develop..   *Pt may benefit from MBSS to further assess swallow function; however, pt fatigued and unable to tolerate upright posture for completion this date. SLP to monitor.     NAME:Terri Smith  : 1940 (84 y.o.)   MRN: [id number]  ROOM: [address] [place] [address]  ADMISSION DATE: 2024  PATIENT DIAGNOSIS(ES): Shortness of breath [R06.02]  Septicemia (HCC) [A41.9]  Pneumonia, unspecified organism [J18.9]  Pneumonia due to infectious organism, unspecified laterality, unspecified part of lung [J18.9]  Allergies   Allergen Reactions    Latex Other (See Comments)     Burning    Other reaction(s): Dermatitis, Other (See Comments)  Burning  Burning      Codeine Other (See Comments)     \"hallucinations\"  Other reaction(s): Other (See Comments)  \"hallucinations\"  Hallucinations    Hallucinations         DATE ONSET: 2024    Pain: The patient does not complain of pain       Current Diet: ADULT DIET; Easy to Chew      Diet Tolerance:  Patient on easy to chew solids per MD despite SLP recs for NPO    Dysphagia Treatment and Impressions:  Subjective: Pt seen in room at bedside with RN permission    Behavior / Cognition: Alert and cooperative, although still fatigued and weak presenting  RN Report/Chart Review: RN reported pt with improved RAMSES and mentation, but worsening tremors that appear to fluctuate and impacted pt's self-feeding  Patient tolerance: Pt on ETC solids per MD- appears to be grossly tolerating    Baseline Respiratory Status Measures: Pt with SPO2% of 95 on 3 LPM NC with RR of 22    Liquid PO Trials:    IDDSI 0 Thin:  Assessed via straw: no anterior bolus loss , swallow timing subjectively appears timely, no clinical s/s of aspiration, vitals stable, and increased WOB    Solid PO Trials  IDDSI 7 Regular:   no anterior bolus loss , swallow timing subjectively appears timely, prolonged mastication, oral stasis noted post swallow, no clinical s/s of aspiration, vitals stable, and increased WOB      Repeat Respiratory Status Measures: Pt with SPO2% of 94 on 3 LPM NC with RR of 24    Impressions:    Pt upright in bed, alert and agreeable to dysphagia tx. Pt agreeable to minimal PO as recently completed breakfast. Pt's daughter at bedside reported pt consumed minimal breakfast with reduced appetite at baseline.   Pt able to feed self, although difficulty coordinating 2/2 tremors.   Pt with slightly prolonged mastication for regular solid (suspect 2/2 reduced dentition) with min oral residue cleared with liquid rinse. No overt s/s aspiration, although increased WOB observed across consistencies. Pt's daughter reported pt SOB with all physical exertion currently. Pt with poor tolerance for upright posturing and requested recline in bed following minimal intake.   SLP edu pt/pt's daughter re: MBSS protocol and potential benefits to completing (extensive h/o esophageal dysphagia, new onset PNA, reflux and regurgitation placing pt at increased risk of aspiration, etc.). SLP further edu re: barriers to completing, including fatigue and inability to tolerate upright posture. Pt agreeable to monitor

## 2024-12-13 NOTE — CONSULTS
(based on SCr of 1.1 mg/dL).  Recent Labs     24  0659 24  0521 24  0519   WBC 11.2* 9.8 8.9     Plan: Reduce to vancomycin 750mg q24h  Predicted AUC: 554 mg/L.hr  Predicted trough: 13.3 mg/L  Recheck vancomycin level  1200  Suhail Brandon [facility] 2024 6:51 AM    Vancomycin Follow Up 12/15/2024 8:13 AM  Vancomycin Day: 3  Indication: pneumonia  Micro:    Staph screen - MRSA detected   Blood - NGTD    Recent Labs     24  0521 24  0519 12/15/24  0633 12/15/24  0634   VANCORANDOM  --  23.8  --   --    WBC 9.8 8.9  --  9.7   CREATININE 0.8 1.1 1.3*  --    BUN 18 18 17  --    Estimated Creatinine Clearance: 24 mL/min (A) (based on SCr of 1.3 mg/dL (H)).    Current Dosing Method: Bayesian-Guided AUC Dosing  Therapeutic Goal: -600 mg/L*hr    A/P:  [facility] has been updated with administration times, serum creatinine levels, and vancomycin blood levels  Current Dosin mg every 24 hours   Calculated AUC: 625 mg/L.hr Predicted Trough: 16.8 mcg/L                                                                                                                                     Will change to intermittent/pulse dosing                                          Level ordered for 12/15 at 2300    Thank you for the consult!   Kerry Houser, Stephane, STEPH    ------------------------------------------------------------------------------------------------------  2024 10:31 AM                                           Vancomycin Progress Note  Day:  Indication: PNA Other Antibiotics: None     Recent Labs     24  0519 12/15/24  2349 24  0648   VANCORANDOM 23.8 17.7 17.9     Recent Labs     24  0519 12/15/24  0633 24  0648   CREATININE 1.1 1.3* 1.0     Recent Labs     24  0519 12/15/24  0634 24  0810   WBC 8.9 9.7 8.0     Estimated Creatinine Clearance: 30 mL/min (based on SCr of 1 mg/dL).  Date Culture Results    Blood x2 NG x 4

## 2024-12-13 NOTE — CONSULTS
CONSULTATION  Terri Smith is a 84 y.o. female asked to see us in consultation by  Jackson Ontiveros MD & Zaki Fermin MD for evaluation of melena.    Ms. Smith is an 84-year-old female with past medical history of HTN, COPD, chronic O2 dependence, vertigo, CAD s/p CABG 2018, CHF, CKD, DM2, A-fib on Eliquis, dysphagia, Schatzki's ring s/p dilation and 9/2024 anxiety, depression and GERD who presented to the ER with shortness of breath.  She was discharged on 12/6 for similar presentation.  She was admitted with hypoxic respiratory failure, pneumonia and A-fib with RVR.  She reportedly had a black bowel  movement last night.  Even consulted to evaluate.  The patient is on oral iron.  She reports abdominal pain.  No vomiting.  Patient's daughter states that the patient's stool always appears black, when she wipes is actually dark green.  When the patient was admitted last week we did do an upper endoscopy in evaluation of melena that was unremarkable.  I had stopped her iron at that time and her stools did lighten up.  Stool is occult positive, however she does have history of fissures and constipation.    Hgb is currently 10 which is improved from 9.1 at discharge last week.  BUN is normal.            Medications Prior to Admission: meclizine (ANTIVERT) 12.5 MG tablet, Take 1 tablet by mouth 3 times daily as needed for Dizziness  furosemide (LASIX) 20 MG tablet, Take 1 tablet by mouth daily  metoprolol tartrate (LOPRESSOR) 25 MG tablet, Take 0.5 tablets by mouth 2 times daily  midodrine (PROAMATINE) 5 MG tablet, Take 1 tablet by mouth 2 times daily as needed (SBP < 90 or MAP < 65)  budesonide (PULMICORT) 0.5 MG/2ML nebulizer suspension, Take 2 mLs by nebulization in the morning and 2 mLs in the evening.  gabapentin (NEURONTIN) 100 MG capsule, Take 1 capsule by mouth 3 times daily for 30

## 2024-12-14 ENCOUNTER — APPOINTMENT (OUTPATIENT)
Dept: GENERAL RADIOLOGY | Age: 84
End: 2024-12-14
Payer: MEDICARE

## 2024-12-14 LAB
ALBUMIN SERPL-MCNC: 3.2 G/DL (ref 3.4–5)
ALBUMIN/GLOB SERPL: 1.1 {RATIO} (ref 1.1–2.2)
ALP SERPL-CCNC: 67 U/L (ref 40–129)
ALT SERPL-CCNC: <5 U/L (ref 10–40)
ANION GAP SERPL CALCULATED.3IONS-SCNC: 8 MMOL/L (ref 3–16)
AST SERPL-CCNC: 21 U/L (ref 15–37)
BASOPHILS # BLD: 0 K/UL (ref 0–0.2)
BASOPHILS NFR BLD: 0.3 %
BILIRUB SERPL-MCNC: <0.2 MG/DL (ref 0–1)
BUN SERPL-MCNC: 18 MG/DL (ref 7–20)
CALCIUM SERPL-MCNC: 10.2 MG/DL (ref 8.3–10.6)
CHLORIDE SERPL-SCNC: 96 MMOL/L (ref 99–110)
CO2 SERPL-SCNC: 37 MMOL/L (ref 21–32)
CREAT SERPL-MCNC: 1.1 MG/DL (ref 0.6–1.2)
DEPRECATED RDW RBC AUTO: 14.7 % (ref 12.4–15.4)
EOSINOPHIL # BLD: 0.1 K/UL (ref 0–0.6)
EOSINOPHIL NFR BLD: 1.1 %
GFR SERPLBLD CREATININE-BSD FMLA CKD-EPI: 50 ML/MIN/{1.73_M2}
GLUCOSE SERPL-MCNC: 116 MG/DL (ref 70–99)
HCT VFR BLD AUTO: 28.4 % (ref 36–48)
HGB BLD-MCNC: 9.2 G/DL (ref 12–16)
LYMPHOCYTES # BLD: 1.1 K/UL (ref 1–5.1)
LYMPHOCYTES NFR BLD: 12.1 %
MAGNESIUM SERPL-MCNC: 1.74 MG/DL (ref 1.8–2.4)
MCH RBC QN AUTO: 30.7 PG (ref 26–34)
MCHC RBC AUTO-ENTMCNC: 32.5 G/DL (ref 31–36)
MCV RBC AUTO: 94.5 FL (ref 80–100)
MONOCYTES # BLD: 0.6 K/UL (ref 0–1.3)
MONOCYTES NFR BLD: 6.3 %
NEUTROPHILS # BLD: 7.1 K/UL (ref 1.7–7.7)
NEUTROPHILS NFR BLD: 80.2 %
PLATELET # BLD AUTO: 411 K/UL (ref 135–450)
PMV BLD AUTO: 7.2 FL (ref 5–10.5)
POTASSIUM SERPL-SCNC: 3.2 MMOL/L (ref 3.5–5.1)
PROT SERPL-MCNC: 6.1 G/DL (ref 6.4–8.2)
RBC # BLD AUTO: 3.01 M/UL (ref 4–5.2)
SODIUM SERPL-SCNC: 141 MMOL/L (ref 136–145)
VANCOMYCIN SERPL-MCNC: 23.8 UG/ML
WBC # BLD AUTO: 8.9 K/UL (ref 4–11)

## 2024-12-14 PROCEDURE — 6360000002 HC RX W HCPCS: Performed by: INTERNAL MEDICINE

## 2024-12-14 PROCEDURE — 05H933Z INSERTION OF INFUSION DEVICE INTO RIGHT BRACHIAL VEIN, PERCUTANEOUS APPROACH: ICD-10-PCS | Performed by: STUDENT IN AN ORGANIZED HEALTH CARE EDUCATION/TRAINING PROGRAM

## 2024-12-14 PROCEDURE — 2060000000 HC ICU INTERMEDIATE R&B

## 2024-12-14 PROCEDURE — 6370000000 HC RX 637 (ALT 250 FOR IP): Performed by: INTERNAL MEDICINE

## 2024-12-14 PROCEDURE — 80053 COMPREHEN METABOLIC PANEL: CPT

## 2024-12-14 PROCEDURE — 94761 N-INVAS EAR/PLS OXIMETRY MLT: CPT

## 2024-12-14 PROCEDURE — 92526 ORAL FUNCTION THERAPY: CPT

## 2024-12-14 PROCEDURE — 6360000002 HC RX W HCPCS

## 2024-12-14 PROCEDURE — 85025 COMPLETE CBC W/AUTO DIFF WBC: CPT

## 2024-12-14 PROCEDURE — 71045 X-RAY EXAM CHEST 1 VIEW: CPT

## 2024-12-14 PROCEDURE — C1751 CATH, INF, PER/CENT/MIDLINE: HCPCS

## 2024-12-14 PROCEDURE — 6370000000 HC RX 637 (ALT 250 FOR IP)

## 2024-12-14 PROCEDURE — 6360000002 HC RX W HCPCS: Performed by: STUDENT IN AN ORGANIZED HEALTH CARE EDUCATION/TRAINING PROGRAM

## 2024-12-14 PROCEDURE — 2580000003 HC RX 258

## 2024-12-14 PROCEDURE — 94640 AIRWAY INHALATION TREATMENT: CPT

## 2024-12-14 PROCEDURE — 2700000000 HC OXYGEN THERAPY PER DAY

## 2024-12-14 PROCEDURE — 2580000003 HC RX 258: Performed by: INTERNAL MEDICINE

## 2024-12-14 PROCEDURE — 36569 INSJ PICC 5 YR+ W/O IMAGING: CPT

## 2024-12-14 PROCEDURE — 99222 1ST HOSP IP/OBS MODERATE 55: CPT | Performed by: STUDENT IN AN ORGANIZED HEALTH CARE EDUCATION/TRAINING PROGRAM

## 2024-12-14 PROCEDURE — 36415 COLL VENOUS BLD VENIPUNCTURE: CPT

## 2024-12-14 PROCEDURE — 83735 ASSAY OF MAGNESIUM: CPT

## 2024-12-14 PROCEDURE — 80202 ASSAY OF VANCOMYCIN: CPT

## 2024-12-14 RX ORDER — SODIUM CHLORIDE 9 MG/ML
INJECTION, SOLUTION INTRAVENOUS PRN
Status: DISCONTINUED | OUTPATIENT
Start: 2024-12-14 | End: 2024-12-18 | Stop reason: SDUPTHER

## 2024-12-14 RX ORDER — SODIUM CHLORIDE 0.9 % (FLUSH) 0.9 %
5-40 SYRINGE (ML) INJECTION PRN
Status: DISCONTINUED | OUTPATIENT
Start: 2024-12-14 | End: 2024-12-16 | Stop reason: SDUPTHER

## 2024-12-14 RX ORDER — ARFORMOTEROL TARTRATE 15 UG/2ML
15 SOLUTION RESPIRATORY (INHALATION)
Status: DISCONTINUED | OUTPATIENT
Start: 2024-12-14 | End: 2024-12-21 | Stop reason: HOSPADM

## 2024-12-14 RX ORDER — SODIUM CHLORIDE 0.9 % (FLUSH) 0.9 %
5-40 SYRINGE (ML) INJECTION EVERY 12 HOURS SCHEDULED
Status: DISCONTINUED | OUTPATIENT
Start: 2024-12-14 | End: 2024-12-16 | Stop reason: SDUPTHER

## 2024-12-14 RX ORDER — VANCOMYCIN 750 MG/150ML
750 INJECTION, SOLUTION INTRAVENOUS EVERY 24 HOURS
Status: DISCONTINUED | OUTPATIENT
Start: 2024-12-14 | End: 2024-12-15

## 2024-12-14 RX ORDER — PANTOPRAZOLE SODIUM 40 MG/10ML
40 INJECTION, POWDER, LYOPHILIZED, FOR SOLUTION INTRAVENOUS 2 TIMES DAILY
Status: DISCONTINUED | OUTPATIENT
Start: 2024-12-14 | End: 2024-12-16

## 2024-12-14 RX ORDER — LIDOCAINE HYDROCHLORIDE 10 MG/ML
50 INJECTION, SOLUTION INFILTRATION; PERINEURAL ONCE
Status: DISCONTINUED | OUTPATIENT
Start: 2024-12-14 | End: 2024-12-21 | Stop reason: HOSPADM

## 2024-12-14 RX ORDER — MAGNESIUM SULFATE IN WATER 40 MG/ML
2000 INJECTION, SOLUTION INTRAVENOUS ONCE
Status: COMPLETED | OUTPATIENT
Start: 2024-12-14 | End: 2024-12-14

## 2024-12-14 RX ORDER — POTASSIUM CHLORIDE 7.45 MG/ML
10 INJECTION INTRAVENOUS
Status: COMPLETED | OUTPATIENT
Start: 2024-12-14 | End: 2024-12-14

## 2024-12-14 RX ORDER — POTASSIUM CHLORIDE 7.45 MG/ML
10 INJECTION INTRAVENOUS
Status: DISPENSED | OUTPATIENT
Start: 2024-12-14 | End: 2024-12-14

## 2024-12-14 RX ORDER — FUROSEMIDE 10 MG/ML
20 INJECTION INTRAMUSCULAR; INTRAVENOUS DAILY
Status: DISCONTINUED | OUTPATIENT
Start: 2024-12-14 | End: 2024-12-21 | Stop reason: HOSPADM

## 2024-12-14 RX ADMIN — SODIUM CHLORIDE, PRESERVATIVE FREE 10 ML: 5 INJECTION INTRAVENOUS at 22:01

## 2024-12-14 RX ADMIN — ALBUTEROL SULFATE 2.5 MG: 2.5 SOLUTION RESPIRATORY (INHALATION) at 15:53

## 2024-12-14 RX ADMIN — POTASSIUM CHLORIDE 10 MEQ: 7.46 INJECTION, SOLUTION INTRAVENOUS at 10:35

## 2024-12-14 RX ADMIN — METOPROLOL TARTRATE 12.5 MG: 25 TABLET, FILM COATED ORAL at 10:17

## 2024-12-14 RX ADMIN — ALBUTEROL SULFATE 2.5 MG: 2.5 SOLUTION RESPIRATORY (INHALATION) at 12:46

## 2024-12-14 RX ADMIN — SODIUM CHLORIDE, PRESERVATIVE FREE 10 ML: 5 INJECTION INTRAVENOUS at 10:16

## 2024-12-14 RX ADMIN — FUROSEMIDE 20 MG: 20 TABLET ORAL at 10:17

## 2024-12-14 RX ADMIN — PANTOPRAZOLE SODIUM 40 MG: 40 INJECTION, POWDER, FOR SOLUTION INTRAVENOUS at 11:43

## 2024-12-14 RX ADMIN — BUDESONIDE 500 MCG: 0.5 SUSPENSION RESPIRATORY (INHALATION) at 09:07

## 2024-12-14 RX ADMIN — ARFORMOTEROL TARTRATE 15 MCG: 15 SOLUTION RESPIRATORY (INHALATION) at 21:04

## 2024-12-14 RX ADMIN — MAGNESIUM SULFATE HEPTAHYDRATE 2000 MG: 40 INJECTION, SOLUTION INTRAVENOUS at 10:58

## 2024-12-14 RX ADMIN — ALBUTEROL SULFATE 2.5 MG: 2.5 SOLUTION RESPIRATORY (INHALATION) at 20:59

## 2024-12-14 RX ADMIN — ONDANSETRON 4 MG: 4 TABLET, ORALLY DISINTEGRATING ORAL at 10:16

## 2024-12-14 RX ADMIN — GABAPENTIN 100 MG: 100 CAPSULE ORAL at 10:17

## 2024-12-14 RX ADMIN — POTASSIUM CHLORIDE 10 MEQ: 7.46 INJECTION, SOLUTION INTRAVENOUS at 11:50

## 2024-12-14 RX ADMIN — FUROSEMIDE 20 MG: 10 INJECTION, SOLUTION INTRAMUSCULAR; INTRAVENOUS at 13:13

## 2024-12-14 RX ADMIN — POTASSIUM CHLORIDE 10 MEQ: 7.46 INJECTION, SOLUTION INTRAVENOUS at 13:14

## 2024-12-14 RX ADMIN — ESCITALOPRAM OXALATE 10 MG: 10 TABLET ORAL at 10:17

## 2024-12-14 RX ADMIN — SODIUM CHLORIDE: 9 INJECTION, SOLUTION INTRAVENOUS at 23:32

## 2024-12-14 RX ADMIN — PANTOPRAZOLE SODIUM 40 MG: 40 INJECTION, POWDER, FOR SOLUTION INTRAVENOUS at 21:59

## 2024-12-14 RX ADMIN — VANCOMYCIN 750 MG: 750 INJECTION, SOLUTION INTRAVENOUS at 23:34

## 2024-12-14 RX ADMIN — GUAIFENESIN 600 MG: 600 TABLET ORAL at 10:17

## 2024-12-14 RX ADMIN — DOCUSATE SODIUM 100 MG: 100 CAPSULE, LIQUID FILLED ORAL at 10:17

## 2024-12-14 RX ADMIN — WATER 1000 MG: 1 INJECTION INTRAMUSCULAR; INTRAVENOUS; SUBCUTANEOUS at 13:13

## 2024-12-14 RX ADMIN — ALBUTEROL SULFATE 2.5 MG: 2.5 SOLUTION RESPIRATORY (INHALATION) at 09:07

## 2024-12-14 RX ADMIN — BUDESONIDE 500 MCG: 0.5 SUSPENSION RESPIRATORY (INHALATION) at 21:09

## 2024-12-14 ASSESSMENT — PAIN SCALES - GENERAL
PAINLEVEL_OUTOF10: 0
PAINLEVEL_OUTOF10: 0

## 2024-12-14 NOTE — PLAN OF CARE
Problem: Chronic Conditions and Co-morbidities  Goal: Patient's chronic conditions and co-morbidity symptoms are monitored and maintained or improved  Outcome: Progressing     Problem: Discharge Planning  Goal: Discharge to home or other facility with appropriate resources  Outcome: Progressing     Problem: Safety - Adult  Goal: Free from fall injury  Outcome: Progressing     Problem: Pain  Goal: Verbalizes/displays adequate comfort level or baseline comfort level  Outcome: Progressing     Problem: Skin/Tissue Integrity  Goal: Absence of new skin breakdown  Description: 1.  Monitor for areas of redness and/or skin breakdown  2.  Assess vascular access sites hourly  3.  Every 4-6 hours minimum:  Change oxygen saturation probe site  4.  Every 4-6 hours:  If on nasal continuous positive airway pressure, respiratory therapy assess nares and determine need for appliance change or resting period.  Outcome: Progressing     Problem: ABCDS Injury Assessment  Goal: Absence of physical injury  Outcome: Progressing     Problem: Confusion  Goal: Confusion, delirium, dementia, or psychosis is improved or at baseline  Description: INTERVENTIONS:  1. Assess for possible contributors to thought disturbance, including medications, impaired vision or hearing, underlying metabolic abnormalities, dehydration, psychiatric diagnoses, and notify attending LIP  2. [place] high risk fall precautions, as indicated  3. Provide frequent short contacts to provide reality reorientation, refocusing and direction  4. Decrease environmental stimuli, including noise as appropriate  5. Monitor and intervene to maintain adequate nutrition, hydration, elimination, sleep and activity  6. If unable to ensure safety without constant attention obtain sitter and review sitter guidelines with assigned personnel  7. Initiate Psychosocial CNS and Spiritual Care consult, as indicated  Outcome: Progressing     Problem: Safety - Medical Restraint  Goal:

## 2024-12-14 NOTE — PROGRESS NOTES
12/14/24 1741   NIV Type   Equipment Type V60   Mode Bilevel   Mask Type Full face mask   Mask Size Small   Assessment   Pulse 78   Respirations 23   SpO2 98 %   Comfort Level Good   Using Accessory Muscles No   Mask Compliance Good   Skin Assessment Clean, dry, & intact   Skin Protection for O2 Device Yes   Orientation Middle   Location Nose   Intervention(s) Skin Barrier   Settings/Measurements   PIP Observed 12 cm H20   IPAP 12 cmH20   CPAP/EPAP 6 cmH2O   Vt (Measured) 246 mL   Rate Ordered 8   FiO2  40 %   Minute Volume (L/min) 6.2 Liters   Mask Leak (lpm) 1 lpm   Patient's Home Machine No   Alarm Settings   Alarms On Y   Low Pressure (cmH2O) 6 cmH2O   High Pressure (cmH2O) 30 cmH2O   Apnea (secs) 20 secs   RR Low (bpm) 6   RR High (bpm) 45 br/min

## 2024-12-14 NOTE — PROGRESS NOTES
PROGRESS NOTE  S:84 yrs Patient  admitted on 12/11/2024 with Shortness of breath [R06.02]  Septicemia (HCC) [A41.9]  Pneumonia, unspecified organism [J18.9]  Pneumonia due to infectious organism, unspecified laterality, unspecified part of lung [J18.9] .    Patient reports epigastric pain. Per speech she had vomiting today during their assessment.     Current Hospital Schedued Meds   vancomycin  750 mg IntraVENous Q24H    potassium chloride  10 mEq IntraVENous Q1H    magnesium sulfate  2,000 mg IntraVENous Once    pantoprazole  40 mg IntraVENous BID    furosemide  20 mg Oral Daily    albuterol  2.5 mg Nebulization Q4H WA RT    atorvastatin  40 mg Oral Nightly    budesonide  0.5 mg Nebulization BID RT    docusate sodium  100 mg Oral Daily    [Held by provider] apixaban  2.5 mg Oral BID    escitalopram  10 mg Oral Daily    gabapentin  100 mg Oral TID    guaiFENesin  600 mg Oral Daily    levothyroxine  25 mcg Oral Daily    metoprolol tartrate  12.5 mg Oral BID    Roflumilast  500 mcg Oral Daily    sodium chloride flush  5-40 mL IntraVENous 2 times per day    cefTRIAXone (ROCEPHIN) IV  1,000 mg IntraVENous Q24H     Current Hospital IV Meds   sodium chloride 10 mL (12/13/24 1314)     Current Hospital PRN Meds  fentanNYL, busPIRone, fluticasone, sodium chloride, sodium chloride flush, sodium chloride, ondansetron **OR** ondansetron, polyethylene glycol, acetaminophen **OR** acetaminophen    Exam:   Vitals:    12/14/24 0907   BP:    Pulse: 85   Resp: 16   Temp:    SpO2: 99%     I/O last 3 completed shifts:  In: 360 [P.O.:360]  Out: 1500 [Urine:1500]  A medically necessary and appropriate physical exam was performed.     Labs:  CBC:   Recent Labs     12/12/24  0659 12/12/24  2234 12/13/24  0521 12/14/24  0519   WBC 11.2*  --  9.8 8.9   HGB 9.3* 9.6* 10.0* 9.2*   HCT 31.0* 30.5* 31.1* 28.4*   MCV 98.9  --  95.1 94.5     --  468* 411     BMP:   Recent Labs     12/12/24  0549

## 2024-12-14 NOTE — PROGRESS NOTES
V2.0    [facility] [place] Progress Note      Name:  Terri Smith /Age/Sex: 1940  (84 y.o. female)   MRN & CSN:  [id number] & [id number] Encounter Date/Time: 2024 9:51 AM EST   Location:   PCP: Jackson Ontiveros MD     Attending:Zaki Fermin MD       Hospital Day: 4    Assessment and Recommendations   Terri Smith is a 84 y.o. female with pmh of atrial fibrillation, CAD, CHF, COPD, GERD, hypothyroidism who presents with Pneumonia, unspecified organism and pulmonary edema.    Interval history  Patient seen and examined at bedside this morning.  She was sitting in bed using 2 L oxygen via nasal cannula.  She reported having vomiting with coughing and phlegm.  She denied having any chest pain.     Plan:    Hypoxic and hypercapnic respiratory failure.    Patient requiring increased O2.      continue mucoid nebulization.  Probably secondary to underlying pneumonia.    Continue Roflumilast  underlying COPD.    may require BiPAP if breathing worsens.   Consult pulmonary, Initial blood gas consistent with respiratory acidosis, repeat > improved   Community-acquired pneumonia.   CXR > airspace opacities  Continue IV ceftriaxone and vancomycin  Repeat chest x-ray showed progression of airspace opacities  MRSA screen.,  Positive  Continue IV vancomycin  Monitor leukocytosis. white count 15.1.> 11.2  Recurrent choking episode/aspiration  Repeat chest x-ray today  Pulmonology consulted  Paroxysmal atrial fibrillation with rapid ventricular response.  Metoprolol and Eliquis at home  Eliquis held for now due to suspicion of upper GI bleed  Patient given a dose of diltiazem.    Continue telemetry   patient may need Lopressor drip if heart rate remains elevated  Pulmonary 73 on admission, recheck today  Repeat EKG this morning showed tachycardia 125.    cardiology consulted > advised to continue current regime   Continue furosemide 20 mg once daily  sinus tachycardia is multifactorial    Component Value Date/Time    BC  12/11/2024 11:21 AM     No Growth to date.  Any change in status will be called.     Lab Results   Component Value Date/Time    BLOODCULT2  12/11/2024 11:21 AM     No Growth to date.  Any change in status will be called.     Organism:   Lab Results   Component Value Date/Time    ORG Staph aureus MRSA 12/11/2024 06:43 PM         Electronically signed by Mena Calzada MD on 12/14/2024 at 7:11 AM

## 2024-12-14 NOTE — PROGRESS NOTES
focal deficit present.      Mental Status: She is alert and oriented to person, place, and time.   Psychiatric:         Mood and Affect: Mood normal.            Data Reviewed:   LABS:  :   Recent Labs     12/12/24  0659 12/12/24 2234 12/13/24  0521 12/14/24  0519   WBC 11.2*  --  9.8 8.9   HGB 9.3* 9.6* 10.0* 9.2*   HCT 31.0* 30.5* 31.1* 28.4*   MCV 98.9  --  95.1 94.5     --  468* 411     BMP:   Recent Labs     12/12/24  0549 12/13/24  0521 12/14/24  0519   * 143 141   K 5.0 3.7 3.2*    96* 96*   CO2 27 29 37*   BUN 21* 18 18   CREATININE 1.1 0.8 1.1     PROFILE:   Recent Labs     12/12/24  0549 12/13/24  0521 12/14/24  0519   AST 19 22 21   ALT <5* <5* <5*   BILITOT <0.2 0.3 <0.2   ALKPHOS 83 78 67     PT/INR: No results for input(s): \"PROTIME\", \"INR\" in the last 72 hours.  APTT:No results for input(s): \"APTT\" in the last 72 hours.  :No results for input(s): \"NITRITE\", \"COLORU\", \"PHUR\", \"LABCAST\", \"WBCUA\", \"RBCUA\", \"MUCUS\", \"TRICHOMONAS\", \"YEAST\", \"BACTERIA\", \"CLARITYU\", \"SPECGRAV\", \"LEUKOCYTESUR\", \"UROBILINOGEN\", \"BILIRUBINUR\", \"BLOODU\", \"GLUCOSEU\", \"AMORPHOUS\" in the last 72 hours.    Invalid input(s): \"KETONESU\"  No results for input(s): \"PHART\", \"XCF1JWK\", \"PO2ART\" in the last 72 hours.       Chest x-ray 12/14/2024  No acute cardiopulmonary disease.    Assessment/Plan   84 y.o. year old female with significant past medical history of hypertension, hyperlipidemia, A-fib, tobacco dependence that [facility].    Assessment:  Chronic hypoxic respiratory failure  Former smoker  Severe COPD  Aspiration    Plan:  - Supplemental O2 therapy with goal saturation 88-92%, she is on her baseline 2-3L and appears comfortable on exam  - MRSA screen positive though this does not appear to be a MRSA PNA. Would finish current course of abx, her CXR does not appear abnormal  - Aspiration Precautions, recommend SLP eval  - Restarted on home nebulizers - Brovana/Pulmicort

## 2024-12-14 NOTE — CONSULTS
Pulmonary New Consultation        Patient Name:  Terri Smith     REASONFOR ADMISSION/CC: Shortness of breath     PCP: Jackson Ontiveros MD     HISTORY OF PRESENT ILLNESS:   84 y.o. year old female with significant past medical history of hypertension, hyperlipidemia, A-fib, tobacco dependence that presents to [facility] for shortness of breath.  Due to patient's memory issues, she is unable to give a full history.  According EMR and daughter, she is admitted to the hospital due to A-fib with RVR and pneumonia.     Patient quit smoking but she is to smoke up to a pack per day for about 40 years.  She denies any illicit drug use, no alcohol use.  She denies any occupational or household exposures.  Pulmonary is consulted for pneumonia.     Past Medical History        Past Medical History:   Diagnosis Date    NADJA (acute kidney injury) ([facility])      Asthma      Atrial fibrillation with RVR ([facility])      CAD (coronary artery disease)      CHF (congestive heart failure) ([facility])       unsure    Chronic anxiety      COPD (chronic obstructive pulmonary disease) ([facility])      COPD (chronic obstructive pulmonary disease) ([facility]) 2023    GERD (gastroesophageal reflux disease) 2021    Heart attack ([facility]) 2019    History of blood transfusion      Hyperlipidemia      Hypertension      MRSA (methicillin resistant staph aureus) culture positive 2019     + resp cx    OA (osteoarthritis) 2014    Thyroid disease              Past Surgical History         Past Surgical History:   Procedure Laterality Date    BRONCHOSCOPY   2019     Dr. Wheatley - w/BAL    CARDIAC CATHETERIZATION   2019     Dr. Palomares     SECTION        COLONOSCOPY N/A 2020     COLONOSCOPY DIAGNOSTIC performed by Rowdy Schmitz DO at [facility] ENDOSCOPY    COLONOSCOPY N/A 10/22/2021     COLONOSCOPY POLYPECTOMY SNARE/COLD BIOPSY performed by Celestine Lester MD at [facility] ENDOSCOPY    COLONOSCOPY N/A  acetaminophen        Allergies:  Patient is allergic to latex and codeine.     REVIEW OF SYSTEMS:  Review of Systems   Constitutional:  Negative for activity change, appetite change, chills, diaphoresis and fatigue.   HENT:  Negative for congestion, sore throat and trouble swallowing.    Eyes:  Negative for pain, discharge, redness and itching.   Respiratory:  Negative for cough, shortness of breath, wheezing and stridor.    Cardiovascular:  Negative for chest pain, palpitations and leg swelling.   Gastrointestinal:  Negative for abdominal distention, abdominal pain, constipation, diarrhea, nausea and vomiting.   Endocrine: Negative for polydipsia, polyphagia and polyuria.   Genitourinary:  Negative for difficulty urinating.   Musculoskeletal:  Negative for back pain, myalgias and neck pain.   Skin:  Negative for color change.   Neurological:  Negative for dizziness, weakness and light-headedness.   Psychiatric/Behavioral:  Negative for agitation and behavioral problems.          I personally reviewed the patient's medication list, medical and surgical history, and updated as needed.      Objective:   EXAM:  /66   Pulse 87   Temp 98.6 °F (37 °C) (Axillary)   Resp 18   Wt 47 kg (103 lb 9.9 oz)   SpO2 99%   BMI 17.78 kg/m²       Physical Exam  Constitutional:       General: She is not in acute distress.     Appearance: She is not toxic-appearing.   HENT:      Head: Normocephalic and atraumatic.      Nose: Nose normal.      Mouth/Throat:      Pharynx: No oropharyngeal exudate.   Eyes:      General: No scleral icterus.        Right eye: No discharge.         Left eye: No discharge.   Cardiovascular:      Rate and Rhythm: Normal rate and regular rhythm.      Heart sounds: No murmur heard.     No friction rub. No gallop.   Pulmonary:      Effort: Pulmonary effort is normal. No respiratory distress.      Breath sounds: Rales present. No wheezing.   Abdominal:      General: Abdomen is flat. Bowel sounds are

## 2024-12-14 NOTE — PROGRESS NOTES
CHF Care Plan      Patient's EF (Ejection Fraction) is greater than 40%    Heart Failure Medications:  Diuretics:: Torsemide    (One of the following REQUIRED for EF </= 40%/SYSTOLIC FAILURE but MAY be used in EF% >40%/DIASTOLIC FAILURE)        ACE:: None        ARB:: None         ARNI:: None    (Beta Blockers)  NON- Evidenced Based Beta Blocker (for EF% >40%/DIASTOLIC FAILURE): Metoprolol TARTrate- Lopressor    Evidenced Based Beta Blocker::(REQUIRED for EF% <40%/SYSTOLIC FAILURE) None  ...................................................................................................................................................    Failed to redirect to the Timeline version of the [employer] SmartLink.      Patient's weights and intake/output reviewed    Daily Weight log at bedside, patient/family participation in use of log: no    Patient's current weight today shows a difference of 6.6 lbs more than last documented weight.      Intake/Output Summary (Last 24 hours) at 12/14/2024 0721  Last data filed at 12/14/2024 0543  Gross per 24 hour   Intake --   Output 500 ml   Net -500 ml       Education Booklet Provided: yes    Comorbidities Reviewed Yes    Patient has a past medical history of NADJA (acute kidney injury) ([facility]), Asthma, Atrial fibrillation with RVR ([facility]), CAD (coronary artery disease), CHF (congestive heart failure) ([facility]), Chronic anxiety, COPD (chronic obstructive pulmonary disease) (HCC), COPD (chronic obstructive pulmonary disease) (HCC), GERD (gastroesophageal reflux disease), Heart attack (HCC), History of blood transfusion, Hyperlipidemia, Hypertension, MRSA (methicillin resistant staph aureus) culture positive, OA (osteoarthritis), and Thyroid disease.     >>For CHF and Comorbidity documentation on Education Time and Topics, please see Education Tab      CHF Education    Learners:  Patient  Readineess:   Acceptance  Method:   Explanation  Response:    Verbalizes Understanding    Comments: none    Time

## 2024-12-14 NOTE — PROGRESS NOTES
Speech Language Pathology  Dysphagia Treatment/Re-Assessment  Facility/Department: [facility] C4 PCU    Recommendations:  Solid Consistency: NPO and Low volume pleasure feeds of puree (IDDSI 4)  Liquid Consistency: LOW VOLUME IDDSI 0 Thin Liquids - straws OK  Medication: Meds PO as tolerated or via alt means   *Recommend close monitoring of respiratory status- hold PO and re-consult SLP if s/s aspiration or worsening respiratory status develops   Risk Management: upright for all intake, stay upright for at least 30 mins after intake, small bites/sips, assist feed, 1:1 supervision with intake, oral care 2-3x/day to reduce adverse affects in the event of aspiration, increase physical mobility as able, alternate bites/sips, slow rate of intake, general GERD precautions, general aspiration precautions, and hold PO and contact SLP if s/s of aspiration or worsening respiratory status develop..     **Pt would likely benefit from MBSS as pt was cleared by GI. Per GI note () pt's \"EGD last week was unremarkable\". SLP to follow    NAME:Terri Smith  : 1940 (84 y.o.)   MRN: [id number]  ROOM: [facility]-  ADMISSION DATE: 2024  PATIENT DIAGNOSIS(ES): Shortness of breath [R06.02]  Septicemia (HCC) [A41.9]  Pneumonia, unspecified organism [J18.9]  Pneumonia due to infectious organism, unspecified laterality, unspecified part of lung [J18.9]  Allergies   Allergen Reactions    Latex Other (See Comments)     Burning    Other reaction(s): Dermatitis, Other (See Comments)  Burning  Burning      Codeine Other (See Comments)     \"hallucinations\"  Other reaction(s): Other (See Comments)  \"hallucinations\"  Hallucinations    Hallucinations         DATE ONSET: 2024    Pain: The patient does not complain of pain       Current Diet: Diet NPO      Diet Tolerance:  Pt NPO following emesis episode following PO intake of thins and medication.     Dysphagia Treatment and Impressions:  Subjective: Pt seen in room at

## 2024-12-15 ENCOUNTER — APPOINTMENT (OUTPATIENT)
Dept: GENERAL RADIOLOGY | Age: 84
End: 2024-12-15
Payer: MEDICARE

## 2024-12-15 LAB
ALBUMIN SERPL-MCNC: 3.1 G/DL (ref 3.4–5)
ALBUMIN/GLOB SERPL: 1 {RATIO} (ref 1.1–2.2)
ALP SERPL-CCNC: 67 U/L (ref 40–129)
ALT SERPL-CCNC: <5 U/L (ref 10–40)
ANION GAP SERPL CALCULATED.3IONS-SCNC: 10 MMOL/L (ref 3–16)
AST SERPL-CCNC: 19 U/L (ref 15–37)
BACTERIA BLD CULT ORG #2: NORMAL
BACTERIA BLD CULT: NORMAL
BASOPHILS # BLD: 0 K/UL (ref 0–0.2)
BASOPHILS NFR BLD: 0.4 %
BILIRUB SERPL-MCNC: <0.2 MG/DL (ref 0–1)
BUN SERPL-MCNC: 17 MG/DL (ref 7–20)
CALCIUM SERPL-MCNC: 10.3 MG/DL (ref 8.3–10.6)
CHLORIDE SERPL-SCNC: 99 MMOL/L (ref 99–110)
CO2 SERPL-SCNC: 35 MMOL/L (ref 21–32)
CREAT SERPL-MCNC: 1.3 MG/DL (ref 0.6–1.2)
DEPRECATED RDW RBC AUTO: 14.4 % (ref 12.4–15.4)
EOSINOPHIL # BLD: 0.1 K/UL (ref 0–0.6)
EOSINOPHIL NFR BLD: 0.6 %
GFR SERPLBLD CREATININE-BSD FMLA CKD-EPI: 41 ML/MIN/{1.73_M2}
GLUCOSE SERPL-MCNC: 94 MG/DL (ref 70–99)
HCT VFR BLD AUTO: 29.7 % (ref 36–48)
HGB BLD-MCNC: 9.3 G/DL (ref 12–16)
LYMPHOCYTES # BLD: 1.3 K/UL (ref 1–5.1)
LYMPHOCYTES NFR BLD: 13 %
MCH RBC QN AUTO: 29.9 PG (ref 26–34)
MCHC RBC AUTO-ENTMCNC: 31.3 G/DL (ref 31–36)
MCV RBC AUTO: 95.5 FL (ref 80–100)
MONOCYTES # BLD: 0.6 K/UL (ref 0–1.3)
MONOCYTES NFR BLD: 6.4 %
NEUTROPHILS # BLD: 7.7 K/UL (ref 1.7–7.7)
NEUTROPHILS NFR BLD: 79.6 %
PLATELET # BLD AUTO: 378 K/UL (ref 135–450)
PMV BLD AUTO: 7.2 FL (ref 5–10.5)
POTASSIUM SERPL-SCNC: 4.9 MMOL/L (ref 3.5–5.1)
PROT SERPL-MCNC: 6.3 G/DL (ref 6.4–8.2)
RBC # BLD AUTO: 3.11 M/UL (ref 4–5.2)
SODIUM SERPL-SCNC: 144 MMOL/L (ref 136–145)
WBC # BLD AUTO: 9.7 K/UL (ref 4–11)

## 2024-12-15 PROCEDURE — 6370000000 HC RX 637 (ALT 250 FOR IP): Performed by: INTERNAL MEDICINE

## 2024-12-15 PROCEDURE — 74018 RADEX ABDOMEN 1 VIEW: CPT

## 2024-12-15 PROCEDURE — 6360000002 HC RX W HCPCS: Performed by: INTERNAL MEDICINE

## 2024-12-15 PROCEDURE — 6360000002 HC RX W HCPCS

## 2024-12-15 PROCEDURE — 94761 N-INVAS EAR/PLS OXIMETRY MLT: CPT

## 2024-12-15 PROCEDURE — 94660 CPAP INITIATION&MGMT: CPT

## 2024-12-15 PROCEDURE — 2060000000 HC ICU INTERMEDIATE R&B

## 2024-12-15 PROCEDURE — 6360000002 HC RX W HCPCS: Performed by: STUDENT IN AN ORGANIZED HEALTH CARE EDUCATION/TRAINING PROGRAM

## 2024-12-15 PROCEDURE — 2580000003 HC RX 258

## 2024-12-15 PROCEDURE — 2580000003 HC RX 258: Performed by: INTERNAL MEDICINE

## 2024-12-15 PROCEDURE — 80202 ASSAY OF VANCOMYCIN: CPT

## 2024-12-15 PROCEDURE — 92526 ORAL FUNCTION THERAPY: CPT

## 2024-12-15 PROCEDURE — 2700000000 HC OXYGEN THERAPY PER DAY

## 2024-12-15 PROCEDURE — 94640 AIRWAY INHALATION TREATMENT: CPT

## 2024-12-15 PROCEDURE — 36415 COLL VENOUS BLD VENIPUNCTURE: CPT

## 2024-12-15 PROCEDURE — 85025 COMPLETE CBC W/AUTO DIFF WBC: CPT

## 2024-12-15 PROCEDURE — 80053 COMPREHEN METABOLIC PANEL: CPT

## 2024-12-15 RX ORDER — POLYETHYLENE GLYCOL 3350 17 G/17G
238 POWDER, FOR SOLUTION ORAL ONCE
Status: COMPLETED | OUTPATIENT
Start: 2024-12-15 | End: 2024-12-15

## 2024-12-15 RX ORDER — BISACODYL 5 MG/1
20 TABLET, DELAYED RELEASE ORAL 2 TIMES DAILY
Status: DISPENSED | OUTPATIENT
Start: 2024-12-15 | End: 2024-12-16

## 2024-12-15 RX ADMIN — BISACODYL 20 MG: 5 TABLET, COATED ORAL at 19:07

## 2024-12-15 RX ADMIN — WATER 1000 MG: 1 INJECTION INTRAMUSCULAR; INTRAVENOUS; SUBCUTANEOUS at 13:02

## 2024-12-15 RX ADMIN — ALBUTEROL SULFATE 2.5 MG: 2.5 SOLUTION RESPIRATORY (INHALATION) at 15:46

## 2024-12-15 RX ADMIN — PANTOPRAZOLE SODIUM 40 MG: 40 INJECTION, POWDER, FOR SOLUTION INTRAVENOUS at 21:17

## 2024-12-15 RX ADMIN — BUDESONIDE 500 MCG: 0.5 SUSPENSION RESPIRATORY (INHALATION) at 20:16

## 2024-12-15 RX ADMIN — SODIUM CHLORIDE, PRESERVATIVE FREE 20 ML: 5 INJECTION INTRAVENOUS at 08:45

## 2024-12-15 RX ADMIN — FUROSEMIDE 20 MG: 10 INJECTION, SOLUTION INTRAMUSCULAR; INTRAVENOUS at 10:27

## 2024-12-15 RX ADMIN — POLYETHYLENE GLYCOL 3350 238 G: 17 POWDER, FOR SOLUTION ORAL at 18:54

## 2024-12-15 RX ADMIN — GABAPENTIN 100 MG: 100 CAPSULE ORAL at 21:33

## 2024-12-15 RX ADMIN — ALBUTEROL SULFATE 2.5 MG: 2.5 SOLUTION RESPIRATORY (INHALATION) at 09:33

## 2024-12-15 RX ADMIN — SODIUM CHLORIDE, PRESERVATIVE FREE 10 ML: 5 INJECTION INTRAVENOUS at 21:22

## 2024-12-15 RX ADMIN — BUDESONIDE 500 MCG: 0.5 SUSPENSION RESPIRATORY (INHALATION) at 09:31

## 2024-12-15 RX ADMIN — ALBUTEROL SULFATE 2.5 MG: 2.5 SOLUTION RESPIRATORY (INHALATION) at 12:01

## 2024-12-15 RX ADMIN — ALBUTEROL SULFATE 2.5 MG: 2.5 SOLUTION RESPIRATORY (INHALATION) at 20:14

## 2024-12-15 RX ADMIN — PANTOPRAZOLE SODIUM 40 MG: 40 INJECTION, POWDER, FOR SOLUTION INTRAVENOUS at 10:27

## 2024-12-15 RX ADMIN — ARFORMOTEROL TARTRATE 15 MCG: 15 SOLUTION RESPIRATORY (INHALATION) at 20:14

## 2024-12-15 RX ADMIN — METOPROLOL TARTRATE 12.5 MG: 25 TABLET, FILM COATED ORAL at 21:29

## 2024-12-15 RX ADMIN — ATORVASTATIN CALCIUM 40 MG: 40 TABLET, FILM COATED ORAL at 21:33

## 2024-12-15 ASSESSMENT — PAIN SCALES - GENERAL
PAINLEVEL_OUTOF10: 0

## 2024-12-15 NOTE — PLAN OF CARE
Problem: Chronic Conditions and Co-morbidities  Goal: Patient's chronic conditions and co-morbidity symptoms are monitored and maintained or improved  Outcome: Progressing     Problem: Discharge Planning  Goal: Discharge to home or other facility with appropriate resources  Outcome: Progressing     Problem: Safety - Adult  Goal: Free from fall injury  Outcome: Progressing     Problem: Pain  Goal: Verbalizes/displays adequate comfort level or baseline comfort level  Outcome: Progressing     Problem: Skin/Tissue Integrity  Goal: Absence of new skin breakdown  Description: 1.  Monitor for areas of redness and/or skin breakdown  2.  Assess vascular access sites hourly  3.  Every 4-6 hours minimum:  Change oxygen saturation probe site  4.  Every 4-6 hours:  If on nasal continuous positive airway pressure, respiratory therapy assess nares and determine need for appliance change or resting period.  Outcome: Progressing     Problem: ABCDS Injury Assessment  Goal: Absence of physical injury  Outcome: Progressing     Problem: Confusion  Goal: Confusion, delirium, dementia, or psychosis is improved or at baseline  Description: INTERVENTIONS:  1. Assess for possible contributors to thought disturbance, including medications, impaired vision or hearing, underlying metabolic abnormalities, dehydration, psychiatric diagnoses, and notify attending LIP  2. [place] high risk fall precautions, as indicated  3. Provide frequent short contacts to provide reality reorientation, refocusing and direction  4. Decrease environmental stimuli, including noise as appropriate  5. Monitor and intervene to maintain adequate nutrition, hydration, elimination, sleep and activity  6. If unable to ensure safety without constant attention obtain sitter and review sitter guidelines with assigned personnel  7. Initiate Psychosocial CNS and Spiritual Care consult, as indicated  Outcome: Progressing     Problem: Safety - Medical Restraint  Goal:

## 2024-12-15 NOTE — RT PROTOCOL NOTE
RT Inhaler-Nebulizer Bronchodilator Protocol Note    There is a bronchodilator order in the chart from a provider indicating to follow the RT Bronchodilator Protocol and there is an “Initiate RT Inhaler-Nebulizer Bronchodilator Protocol” order as well (see protocol at bottom of note).    CXR Findings:  XR CHEST PORTABLE    Result Date: 12/14/2024  Minimal new opacities of the medial left lung base could represent pneumonia/aspiration in the appropriate clinical setting. Chronic bilateral interstitial opacities.       The findings from the last RT Protocol Assessment were as follows:   History Pulmonary Disease: Chronic pulmonary disease  Respiratory Pattern: Mild dyspnea at rest, irregular pattern, or RR 21-25 bpm  Breath Sounds: Severe inspiratory and expiratory wheezing or severely diminished  Cough: Strong, spontaneous, non-productive  Indication for Bronchodilator Therapy: Decreased or absent breath sounds, On home bronchodilators  Bronchodilator Assessment Score: 13    Aerosolized bronchodilator medication orders have been revised according to the RT Inhaler-Nebulizer Bronchodilator Protocol below.    Respiratory Therapist to perform RT Therapy Protocol Assessment initially then follow the protocol.  Repeat RT Therapy Protocol Assessment PRN for score 0-3 or on second treatment, BID, and PRN for scores above 3.    No Indications - adjust the frequency to every 6 hours PRN wheezing or bronchospasm, if no treatments needed after 48 hours then discontinue using Per Protocol order mode.     If indication present, adjust the RT bronchodilator orders based on the Bronchodilator Assessment Score as indicated below.  Use Inhaler orders unless patient has one or more of the following: on home nebulizer, not able to hold breath for 10 seconds, is not alert and oriented, cannot activate and use MDI correctly, or respiratory rate 25 breaths per minute or more, then use the equivalent nebulizer order(s) with same Frequency  and PRN reasons based on the score.  If a patient is on this medication at home then do not decrease Frequency below that used at home.    0-3 - enter or revise RT bronchodilator order(s) to equivalent RT Bronchodilator order with Frequency of every 4 hours PRN for wheezing or increased work of breathing using Per Protocol order mode.        4-6 - enter or revise RT Bronchodilator order(s) to two equivalent RT bronchodilator orders with one order with BID Frequency and one order with Frequency of every 4 hours PRN wheezing or increased work of breathing using Per Protocol order mode.        7-10 - enter or revise RT Bronchodilator order(s) to two equivalent RT bronchodilator orders with one order with TID Frequency and one order with Frequency of every 4 hours PRN wheezing or increased work of breathing using Per Protocol order mode.       11-13 - enter or revise RT Bronchodilator order(s) to one equivalent RT bronchodilator order with QID Frequency and an Albuterol order with Frequency of every 4 hours PRN wheezing or increased work of breathing using Per Protocol order mode.      Greater than 13 - enter or revise RT Bronchodilator order(s) to one equivalent RT bronchodilator order with every 4 hours Frequency and an Albuterol order with Frequency of every 2 hours PRN wheezing or increased work of breathing using Per Protocol order mode.     RT to enter RT Home Evaluation for COPD & MDI Assessment order using Per Protocol order mode.    Electronically signed by Kailyn Pike RCP on 12/14/2024 at 11:00 PM

## 2024-12-15 NOTE — PROGRESS NOTES
V2.0    [facility] Progress Note      Name:  Terri Smith /Age/Sex: 1940  (84 y.o. female)   MRN & CSN:  [id number] & [id number] Encounter Date/Time: 12/15/2024 9:51 AM EST   Location:  [facility] PCP: Jackson Ontiveros MD     Attending:Zaki Fermin MD       Hospital Day: 5    Assessment and Recommendations   Terri Smith is a 84 y.o. female with pmh of atrial fibrillation, CAD, CHF, COPD, GERD, hypothyroidism who presents with Pneumonia, unspecified organism and pulmonary edema.    Interval history: No acute events overnight. VSS on 2 L NC. SLP evaluation yesterday. Recommend NPO. MBSS ordered.     Plan:    Hypoxic and hypercapnic respiratory failure.    Patient currently at baseline O2 requirement  continue mucoid nebulization.  Probably secondary to underlying pneumonia.    Continue Roflumilast  underlying COPD.    Pulmonology consulted. Recommend finishing her abx course for PNA and f/u SLP recs. Pulmonary signed off     Community-acquired pneumonia.   CXR > airspace opacities  Continue IV vancomycin  Repeat chest x-ray showed progression of airspace opacities  MRSA screen.,  Positive  Continue IV vancomycin  Monitor leukocytosis.  Recurrent choking episode/aspiration  Repeat chest x-ray (12/15) showed minimal new opacity in left medial lung base    Paroxysmal atrial fibrillation with rapid ventricular response.  Metoprolol and Eliquis at home  Eliquis held for now due to suspicion of upper GI bleed  Patient given a dose of diltiazem.    Continue telemetry   cardiology consulted > advised to continue current regime   Continue furosemide 20 mg once daily  sinus tachycardia is multifactorial including pneumonia/SIRS combined with anemia, COPD exacerbation, and volume overload.    Melena/anemia:  Had 1 episode of black tarry stool last night  Stool positive for occult blood  GI consulted > advised to hold eliquis and plan for EGD/colonoscopy. Patient is deciding whether she wants to  Chest x-ray showed bilateral pulmonary infiltrates concerning for pneumonia/pulmonary edema.        Review of Systems:      Pertinent positives and negatives discussed in HPI    Objective:     Intake/Output Summary (Last 24 hours) at 12/15/2024 1040  Last data filed at 12/15/2024 0015  Gross per 24 hour   Intake 0 ml   Output 400 ml   Net -400 ml      Vitals:   Vitals:    12/15/24 0442 12/15/24 0700 12/15/24 0933 12/15/24 1033   BP:  130/73  (!) 143/76   Pulse:  83 87    Resp:  19 18    Temp:  99 °F (37.2 °C)     TempSrc:  Axillary     SpO2:  98% 99%    Weight: 46.7 kg (102 lb 15.3 oz)            Physical Exam:      General: NAD  Eyes: EOMI  ENT: neck supple  Cardiovascular: Regular rate.  Respiratory: Clear to auscultation  Gastrointestinal: Soft, non tender  Genitourinary: no suprapubic tenderness  Musculoskeletal: No edema  Skin: warm, dry  Neuro: Alert.  Psych: Mood appropriate.         Medications:   Medications:    vancomycin (VANCOCIN) intermittent dosing (placeholder)   Other RX Placeholder    pantoprazole  40 mg IntraVENous BID    furosemide  20 mg IntraVENous Daily    arformoterol tartrate  15 mcg Nebulization BID RT    sodium chloride flush  5-40 mL IntraVENous 2 times per day    lidocaine 1 % injection  50 mg IntraDERmal Once    albuterol  2.5 mg Nebulization Q4H WA RT    atorvastatin  40 mg Oral Nightly    budesonide  0.5 mg Nebulization BID RT    docusate sodium  100 mg Oral Daily    [Held by provider] apixaban  2.5 mg Oral BID    escitalopram  10 mg Oral Daily    gabapentin  100 mg Oral TID    guaiFENesin  600 mg Oral Daily    levothyroxine  25 mcg Oral Daily    metoprolol tartrate  12.5 mg Oral BID    Roflumilast  500 mcg Oral Daily    sodium chloride flush  5-40 mL IntraVENous 2 times per day    cefTRIAXone (ROCEPHIN) IV  1,000 mg IntraVENous Q24H      Infusions:    sodium chloride 20 mL/hr at 12/14/24 2332    sodium chloride 10 mL (12/13/24 1314)     PRN Meds: sodium chloride flush, 5-40 mL,

## 2024-12-15 NOTE — PROGRESS NOTES
Arrived to place midline with bedside RN. Pre-procedure and timeout done with RN, discussed limitations of placement and allergies. Vital signs stable. Labs, allergies, medications, and code status reviewed. No contraindications noted.    Procedure explained to pt, including the risk and benefits of the procedure. All questions answered. Pt verbalizes understanding of the procedure and states no more questions.     Pt's basilic, brachial, cephalic are all easily collapsible with no indication for a clot. Vein selected is large enough for catheter (please see image below). Pt tolerated sterile procedure well, with no difficulty accessing brachial vein, when accessed - blood was free flowing and non-pulsatile. Guidewire, introducer, and catheter went in smoothly. ML placement verification with blood return and flushes easily.      Nurses:  OK to use Midline.    Please replace all existing IV tubing with new IV tubing prior to using the ML for current IV infusions.  Please remove any PIVs from ML arm.  All of the above may be sources of infection or an increase chance of a clot.      Post procedure - reorganized pt table, placed pt in lowest position, with call light and educated on line care. Instructed pt/RN not to use arm for at least 30min to avoid bleeding. Reported off to bedside RN.      If you have any questions please call number below and dispatch will direct you to the PICC RN that is on call.    [phone]

## 2024-12-15 NOTE — PROGRESS NOTES
CHF Care Plan      Patient's EF (Ejection Fraction) is greater than 40%    Heart Failure Medications:  Diuretics:: Torsemide    (One of the following REQUIRED for EF </= 40%/SYSTOLIC FAILURE but MAY be used in EF% >40%/DIASTOLIC FAILURE)        ACE:: None        ARB:: None         ARNI:: None    (Beta Blockers)  NON- Evidenced Based Beta Blocker (for EF% >40%/DIASTOLIC FAILURE): Metoprolol TARTrate- Lopressor    Evidenced Based Beta Blocker::(REQUIRED for EF% <40%/SYSTOLIC FAILURE) None  ...................................................................................................................................................    Failed to redirect to the Timeline version of the [employer] SmartLink.      Patient's weights and intake/output reviewed    Daily Weight log at bedside, patient/family participation in use of log: \"yes    Patient's current weight today shows a difference of 4 lbs less than last documented weight.      Intake/Output Summary (Last 24 hours) at 12/15/2024 0618  Last data filed at 12/15/2024 0015  Gross per 24 hour   Intake 0 ml   Output 400 ml   Net -400 ml       Education Booklet Provided: yes    Comorbidities Reviewed Yes    Patient has a past medical history of NADJA (acute kidney injury) ([facility]), Asthma, Atrial fibrillation with RVR ([facility]), CAD (coronary artery disease), CHF (congestive heart failure) ([facility]), Chronic anxiety, COPD (chronic obstructive pulmonary disease) (HCC), COPD (chronic obstructive pulmonary disease) (HCC), GERD (gastroesophageal reflux disease), Heart attack (HCC), History of blood transfusion, Hyperlipidemia, Hypertension, MRSA (methicillin resistant staph aureus) culture positive, OA (osteoarthritis), and Thyroid disease.     >>For CHF and Comorbidity documentation on Education Time and Topics, please see Education Tab      CHF Education    Learners:  Patient  Readineess:   Acceptance  Method:   Explanation and Handout  Response:    Verbalizes Understanding,

## 2024-12-15 NOTE — PROGRESS NOTES
PROGRESS NOTE  S:84 yrs Patient  admitted on 12/11/2024 with Shortness of breath [R06.02]  Septicemia (HCC) [A41.9]  Pneumonia, unspecified organism [J18.9]  Pneumonia due to infectious organism, unspecified laterality, unspecified part of lung [J18.9] .    The patient remains hemodynamically stable.  Per the staff her stool appears dark but not black.  Also per the staff the patient is having severe dysphagia and signs of aspiration when swallowing her pills.    Current Hospital Schedued Meds   vancomycin (VANCOCIN) intermittent dosing (placeholder)   Other RX Placeholder    pantoprazole  40 mg IntraVENous BID    furosemide  20 mg IntraVENous Daily    arformoterol tartrate  15 mcg Nebulization BID RT    sodium chloride flush  5-40 mL IntraVENous 2 times per day    lidocaine 1 % injection  50 mg IntraDERmal Once    albuterol  2.5 mg Nebulization Q4H WA RT    atorvastatin  40 mg Oral Nightly    budesonide  0.5 mg Nebulization BID RT    docusate sodium  100 mg Oral Daily    [Held by provider] apixaban  2.5 mg Oral BID    escitalopram  10 mg Oral Daily    gabapentin  100 mg Oral TID    guaiFENesin  600 mg Oral Daily    levothyroxine  25 mcg Oral Daily    metoprolol tartrate  12.5 mg Oral BID    Roflumilast  500 mcg Oral Daily    sodium chloride flush  5-40 mL IntraVENous 2 times per day    cefTRIAXone (ROCEPHIN) IV  1,000 mg IntraVENous Q24H     Current Hospital IV Meds   sodium chloride 20 mL/hr at 12/14/24 2332    sodium chloride 10 mL (12/13/24 1314)     Current Hospital PRN Meds  sodium chloride flush, sodium chloride, fentanNYL, busPIRone, fluticasone, sodium chloride, sodium chloride flush, sodium chloride, ondansetron **OR** ondansetron, polyethylene glycol, acetaminophen **OR** acetaminophen    Exam:   Vitals:    12/15/24 0933   BP:    Pulse: 87   Resp: 18   Temp:    SpO2: 99%     I/O last 3 completed shifts:  In: 0   Out: 900 [Urine:900]  A medically necessary and

## 2024-12-15 NOTE — PROGRESS NOTES
Took dr arlene doherty message and cell number for assigned rn. I could not get a hold of nurse, I called speech for info on modified barium swallow, tech stated that the mbs cannot be done until tomorrow. I tried to call patients dtr, pts decision maker, and got no answer. I was able to get a hold of pts nurse. Reviewed gi docs requests and info needed. Pt took message and will complete requested tasks and get back to this doctor asap.

## 2024-12-15 NOTE — PROGRESS NOTES
Speech Language Pathology  Dysphagia Treatment/Re-Assessment  Facility/Department: [place] [address] [facility]    Recommendations:  Solid Consistency: NPO  Liquid Consistency: Downgrade to LOW VOLUME Ice chips with SLP/RN only (oral care must be completed prior)  Medication: Meds via alt means of nutrition   *Recommend close monitoring of respiratory status- hold PO and re-consult SLP if s/s aspiration or worsening respiratory status develops   Risk Management: upright for all intake, stay upright for at least 30 mins after intake, small bites/sips, assist feed, 1:1 supervision with intake, oral care 2-3x/day to reduce adverse affects in the event of aspiration, increase physical mobility as able, alternate bites/sips, slow rate of intake, general GERD precautions, general aspiration precautions, and hold PO and contact SLP if s/s of aspiration or worsening respiratory status develop..     **Pt would likely benefit from MBSS, however pt no longer appropriate d/t worsening respiratory status/tolerance to PO. SLP to follow     NAME:Terri Smith  : 1940 (84 y.o.)   MRN: [id number]  ROOM: [address] [place] [address]  ADMISSION DATE: 2024  PATIENT DIAGNOSIS(ES): Shortness of breath [R06.02]  Septicemia (HCC) [A41.9]  Pneumonia, unspecified organism [J18.9]  Pneumonia due to infectious organism, unspecified laterality, unspecified part of lung [J18.9]  Allergies   Allergen Reactions    Latex Other (See Comments)     Burning    Other reaction(s): Dermatitis, Other (See Comments)  Burning  Burning      Codeine Other (See Comments)     \"hallucinations\"  Other reaction(s): Other (See Comments)  \"hallucinations\"  Hallucinations    Hallucinations         DATE ONSET: 2024    Pain: The patient does not complain of pain       Current Diet: Diet NPO      Diet Tolerance:  Pt NPO     Dysphagia Treatment and Impressions:  Subjective: Pt seen in room at bedside with RN permission   Behavior / Cognition: Alert and cooperative but appears  anxious   RN Report/Chart Review: RN reports continued regurgitation w/ all PO intake. RN states GI would like to do colonoscopy, repeat EGD and place and NG tube.   Patient tolerance: Pt NPO    Baseline Respiratory Status Measures: Pt with SPO2% of 93 on 4 LPM NC with RR of 28-44    Liquid PO Trials:    IDDSI 0 Thin:  Assessed via ice chips: no anterior bolus loss , swallow timing subjectively appears timely, no clinical s/s of aspiration, drop in O2 saturation, increased RR, and increased WOB  2/2 trials     Repeat Respiratory Status Measures: Pt with SPO2% of 90 on 4 LPM NC with RR of 30-34    Impressions:    Pt sitting upright in chair upon SLP entry with family at bedside. Pt's SPO2% 93% upon collection of vitals with RR of 44. SLP explained to pt that her RR was too high to consume PO. Pt was motivated to have ice chips and took time to attempt to lower RR. Pt RR decreased to 30 and SLP provided x1 small ice chip. Pt RR remain 30 and SLP provided another x1 small ice chip. Pt's O2 dropped to 86% and returned to 90-91% following PO trials. SLP educated pt regarding POC, goals, disuse atrophy, breathing swallowing coordination, etc. Pt would benefit from continued education.     SLP recs pt remain NPO with ONLY PO trials of ice chips to limit disuse atrophy and close monitoring of vitals. Meds via alt means.   Pt may benefit from instrumental to further assess swallow function and safety, however, opt no longer appropriate d/t respiratory needs and dropping stats w/ PO intake.                 Dysphagia Goals:  Timeframe for Long-term Goals: (7 days 12/19/24)  Goal 1: The patient will tolerate least restrictive diet with no clinical s/s of aspiration or worsening respiratory/pulmonary status 12/15/2024 : Ongoing, progressing.     Short-term Goals  Timeframe for Short-term Goals: (5 days 12/17/24)  Goal 1: The patient will tolerate therapeutic diet upgrade trials with no clinical s/s of aspiration 5/5 12/15/2024 :

## 2024-12-15 NOTE — PROGRESS NOTES
12/15/24 0331   NIV Type   $NIV $Daily Charge   NIV Started/Stopped On   Equipment Type v60   Mode Bilevel   Mask Type Full face mask   Mask Size Small   Assessment   Pulse 81   Respirations 20   SpO2 100 %   Comfort Level Good   Using Accessory Muscles No   Mask Compliance Good   Skin Assessment Clean, dry, & intact   Skin Protection for O2 Device Yes   Orientation Bilateral;Middle   Location Cheek;Nose   Intervention(s) Skin Barrier   Settings/Measurements   PIP Observed 12 cm H20   IPAP 12 cmH20   CPAP/EPAP 6 cmH2O   Vt (Measured) 323 mL   Rate Ordered 8   FiO2  40 %   I Time/ I Time % 1 s   Minute Volume (L/min) 6 Liters   Mask Leak (lpm) 22 lpm   Patient's Home Machine No   Alarm Settings   Alarms On Y   Low Pressure (cmH2O) 6 cmH2O   High Pressure (cmH2O) 30 cmH2O   Apnea (secs) 20 secs   RR Low (bpm) 6   RR High (bpm) 45 br/min

## 2024-12-15 NOTE — PROGRESS NOTES
12/15/24 1601   Oxygen Therapy/Pulse Ox   O2 Therapy Oxygen   O2 Device Venturi-mask   O2 Flow Rate (L/min) 9 L/min   FiO2  35 %   Pulse 98   SpO2 95 %

## 2024-12-16 ENCOUNTER — ANESTHESIA (OUTPATIENT)
Dept: ENDOSCOPY | Age: 84
End: 2024-12-16
Payer: MEDICARE

## 2024-12-16 ENCOUNTER — ANESTHESIA EVENT (OUTPATIENT)
Dept: ENDOSCOPY | Age: 84
End: 2024-12-16
Payer: MEDICARE

## 2024-12-16 LAB
ANION GAP SERPL CALCULATED.3IONS-SCNC: 15 MMOL/L (ref 3–16)
BASOPHILS # BLD: 0 K/UL (ref 0–0.2)
BASOPHILS NFR BLD: 0.1 %
BUN SERPL-MCNC: 16 MG/DL (ref 7–20)
CALCIUM SERPL-MCNC: 10.1 MG/DL (ref 8.3–10.6)
CHLORIDE SERPL-SCNC: 95 MMOL/L (ref 99–110)
CO2 SERPL-SCNC: 28 MMOL/L (ref 21–32)
CREAT SERPL-MCNC: 1 MG/DL (ref 0.6–1.2)
DEPRECATED RDW RBC AUTO: 14.6 % (ref 12.4–15.4)
EOSINOPHIL # BLD: 0.1 K/UL (ref 0–0.6)
EOSINOPHIL NFR BLD: 1.1 %
GFR SERPLBLD CREATININE-BSD FMLA CKD-EPI: 56 ML/MIN/{1.73_M2}
GLUCOSE BLD-MCNC: 116 MG/DL (ref 70–99)
GLUCOSE BLD-MCNC: 71 MG/DL (ref 70–99)
GLUCOSE BLD-MCNC: 85 MG/DL (ref 70–99)
GLUCOSE SERPL-MCNC: 61 MG/DL (ref 70–99)
HCT VFR BLD AUTO: 27.3 % (ref 36–48)
HGB BLD-MCNC: 8.6 G/DL (ref 12–16)
LYMPHOCYTES # BLD: 1 K/UL (ref 1–5.1)
LYMPHOCYTES NFR BLD: 12.7 %
MAGNESIUM SERPL-MCNC: 1.95 MG/DL (ref 1.8–2.4)
MCH RBC QN AUTO: 29.9 PG (ref 26–34)
MCHC RBC AUTO-ENTMCNC: 31.4 G/DL (ref 31–36)
MCV RBC AUTO: 95.2 FL (ref 80–100)
MONOCYTES # BLD: 0.4 K/UL (ref 0–1.3)
MONOCYTES NFR BLD: 4.9 %
NEUTROPHILS # BLD: 6.5 K/UL (ref 1.7–7.7)
NEUTROPHILS NFR BLD: 81.2 %
PERFORMED ON: ABNORMAL
PERFORMED ON: NORMAL
PERFORMED ON: NORMAL
PLATELET # BLD AUTO: 347 K/UL (ref 135–450)
PMV BLD AUTO: 7 FL (ref 5–10.5)
POTASSIUM SERPL-SCNC: 3.2 MMOL/L (ref 3.5–5.1)
RBC # BLD AUTO: 2.86 M/UL (ref 4–5.2)
REASON FOR REJECTION: NORMAL
REJECTED TEST: NORMAL
SODIUM SERPL-SCNC: 138 MMOL/L (ref 136–145)
VANCOMYCIN SERPL-MCNC: 17.7 UG/ML
VANCOMYCIN SERPL-MCNC: 17.9 UG/ML
WBC # BLD AUTO: 8 K/UL (ref 4–11)

## 2024-12-16 PROCEDURE — 0DBK8ZZ EXCISION OF ASCENDING COLON, VIA NATURAL OR ARTIFICIAL OPENING ENDOSCOPIC: ICD-10-PCS | Performed by: INTERNAL MEDICINE

## 2024-12-16 PROCEDURE — C1889 IMPLANT/INSERT DEVICE, NOC: HCPCS | Performed by: INTERNAL MEDICINE

## 2024-12-16 PROCEDURE — 6360000002 HC RX W HCPCS

## 2024-12-16 PROCEDURE — 36415 COLL VENOUS BLD VENIPUNCTURE: CPT

## 2024-12-16 PROCEDURE — 0W3P8ZZ CONTROL BLEEDING IN GASTROINTESTINAL TRACT, VIA NATURAL OR ARTIFICIAL OPENING ENDOSCOPIC: ICD-10-PCS | Performed by: INTERNAL MEDICINE

## 2024-12-16 PROCEDURE — 6360000002 HC RX W HCPCS: Performed by: STUDENT IN AN ORGANIZED HEALTH CARE EDUCATION/TRAINING PROGRAM

## 2024-12-16 PROCEDURE — 3609013000 HC EGD TRANSORAL CONTROL BLEEDING ANY METHOD: Performed by: INTERNAL MEDICINE

## 2024-12-16 PROCEDURE — 3700000001 HC ADD 15 MINUTES (ANESTHESIA): Performed by: INTERNAL MEDICINE

## 2024-12-16 PROCEDURE — 2700000000 HC OXYGEN THERAPY PER DAY

## 2024-12-16 PROCEDURE — 3609017700 HC EGD DILATION GASTRIC/DUODENAL STRICTURE: Performed by: INTERNAL MEDICINE

## 2024-12-16 PROCEDURE — 94640 AIRWAY INHALATION TREATMENT: CPT

## 2024-12-16 PROCEDURE — 3609010600 HC COLONOSCOPY POLYPECTOMY SNARE/COLD BIOPSY: Performed by: INTERNAL MEDICINE

## 2024-12-16 PROCEDURE — 0D748ZZ DILATION OF ESOPHAGOGASTRIC JUNCTION, VIA NATURAL OR ARTIFICIAL OPENING ENDOSCOPIC: ICD-10-PCS | Performed by: INTERNAL MEDICINE

## 2024-12-16 PROCEDURE — 3609017100 HC EGD: Performed by: INTERNAL MEDICINE

## 2024-12-16 PROCEDURE — 2580000003 HC RX 258: Performed by: STUDENT IN AN ORGANIZED HEALTH CARE EDUCATION/TRAINING PROGRAM

## 2024-12-16 PROCEDURE — 6360000002 HC RX W HCPCS: Performed by: INTERNAL MEDICINE

## 2024-12-16 PROCEDURE — 83735 ASSAY OF MAGNESIUM: CPT

## 2024-12-16 PROCEDURE — 2709999900 HC NON-CHARGEABLE SUPPLY: Performed by: INTERNAL MEDICINE

## 2024-12-16 PROCEDURE — 3700000000 HC ANESTHESIA ATTENDED CARE: Performed by: INTERNAL MEDICINE

## 2024-12-16 PROCEDURE — 80202 ASSAY OF VANCOMYCIN: CPT

## 2024-12-16 PROCEDURE — 94660 CPAP INITIATION&MGMT: CPT

## 2024-12-16 PROCEDURE — 88305 TISSUE EXAM BY PATHOLOGIST: CPT

## 2024-12-16 PROCEDURE — 7100000011 HC PHASE II RECOVERY - ADDTL 15 MIN: Performed by: INTERNAL MEDICINE

## 2024-12-16 PROCEDURE — 7100000010 HC PHASE II RECOVERY - FIRST 15 MIN: Performed by: INTERNAL MEDICINE

## 2024-12-16 PROCEDURE — 2060000000 HC ICU INTERMEDIATE R&B

## 2024-12-16 PROCEDURE — 6360000002 HC RX W HCPCS: Performed by: NURSE ANESTHETIST, CERTIFIED REGISTERED

## 2024-12-16 PROCEDURE — 85025 COMPLETE CBC W/AUTO DIFF WBC: CPT

## 2024-12-16 PROCEDURE — 94761 N-INVAS EAR/PLS OXIMETRY MLT: CPT

## 2024-12-16 PROCEDURE — 80048 BASIC METABOLIC PNL TOTAL CA: CPT

## 2024-12-16 DEVICE — WORKING LENGTH 235CM, WORKING CHANNEL 2.8MM
Type: IMPLANTABLE DEVICE | Site: ESOPHAGUS | Status: FUNCTIONAL
Brand: RESOLUTION 360 CLIP

## 2024-12-16 RX ORDER — POTASSIUM CHLORIDE 20MEQ/15ML
10 LIQUID (ML) ORAL ONCE
Status: DISCONTINUED | OUTPATIENT
Start: 2024-12-16 | End: 2024-12-16

## 2024-12-16 RX ORDER — POTASSIUM CHLORIDE 7.45 MG/ML
10 INJECTION INTRAVENOUS ONCE
Status: COMPLETED | OUTPATIENT
Start: 2024-12-16 | End: 2024-12-16

## 2024-12-16 RX ORDER — DEXTROSE MONOHYDRATE 100 MG/ML
INJECTION, SOLUTION INTRAVENOUS CONTINUOUS PRN
Status: DISCONTINUED | OUTPATIENT
Start: 2024-12-16 | End: 2024-12-21 | Stop reason: HOSPADM

## 2024-12-16 RX ORDER — POTASSIUM CHLORIDE 750 MG/1
10 TABLET, EXTENDED RELEASE ORAL ONCE
Status: DISCONTINUED | OUTPATIENT
Start: 2024-12-16 | End: 2024-12-16 | Stop reason: SDUPTHER

## 2024-12-16 RX ORDER — LIDOCAINE HYDROCHLORIDE 20 MG/ML
INJECTION, SOLUTION INFILTRATION; PERINEURAL
Status: DISCONTINUED | OUTPATIENT
Start: 2024-12-16 | End: 2024-12-16 | Stop reason: SDUPTHER

## 2024-12-16 RX ORDER — POTASSIUM CHLORIDE 7.45 MG/ML
10 INJECTION INTRAVENOUS ONCE
Status: DISCONTINUED | OUTPATIENT
Start: 2024-12-16 | End: 2024-12-16

## 2024-12-16 RX ORDER — PANTOPRAZOLE SODIUM 40 MG/1
40 TABLET, DELAYED RELEASE ORAL
Status: DISCONTINUED | OUTPATIENT
Start: 2024-12-17 | End: 2024-12-21 | Stop reason: HOSPADM

## 2024-12-16 RX ORDER — PROPOFOL 10 MG/ML
INJECTION, EMULSION INTRAVENOUS
Status: DISCONTINUED | OUTPATIENT
Start: 2024-12-16 | End: 2024-12-16 | Stop reason: SDUPTHER

## 2024-12-16 RX ORDER — GLUCAGON 1 MG/ML
1 KIT INJECTION PRN
Status: DISCONTINUED | OUTPATIENT
Start: 2024-12-16 | End: 2024-12-21 | Stop reason: HOSPADM

## 2024-12-16 RX ORDER — PHENYLEPHRINE HCL IN 0.9% NACL 1 MG/10 ML
SYRINGE (ML) INTRAVENOUS
Status: DISCONTINUED | OUTPATIENT
Start: 2024-12-16 | End: 2024-12-16 | Stop reason: SDUPTHER

## 2024-12-16 RX ADMIN — PROPOFOL 20 MG: 10 INJECTION, EMULSION INTRAVENOUS at 13:31

## 2024-12-16 RX ADMIN — ALBUTEROL SULFATE 2.5 MG: 2.5 SOLUTION RESPIRATORY (INHALATION) at 15:47

## 2024-12-16 RX ADMIN — VANCOMYCIN HYDROCHLORIDE 500 MG: 500 INJECTION, POWDER, LYOPHILIZED, FOR SOLUTION INTRAVENOUS at 15:06

## 2024-12-16 RX ADMIN — BUDESONIDE 500 MCG: 0.5 SUSPENSION RESPIRATORY (INHALATION) at 20:35

## 2024-12-16 RX ADMIN — POTASSIUM CHLORIDE 10 MEQ: 7.46 INJECTION, SOLUTION INTRAVENOUS at 10:07

## 2024-12-16 RX ADMIN — ARFORMOTEROL TARTRATE 15 MCG: 15 SOLUTION RESPIRATORY (INHALATION) at 20:30

## 2024-12-16 RX ADMIN — Medication 100 MCG: at 13:37

## 2024-12-16 RX ADMIN — PROPOFOL 20 MG: 10 INJECTION, EMULSION INTRAVENOUS at 13:41

## 2024-12-16 RX ADMIN — ARFORMOTEROL TARTRATE 15 MCG: 15 SOLUTION RESPIRATORY (INHALATION) at 08:08

## 2024-12-16 RX ADMIN — ALBUTEROL SULFATE 2.5 MG: 2.5 SOLUTION RESPIRATORY (INHALATION) at 08:08

## 2024-12-16 RX ADMIN — PANTOPRAZOLE SODIUM 40 MG: 40 INJECTION, POWDER, FOR SOLUTION INTRAVENOUS at 08:48

## 2024-12-16 RX ADMIN — ALBUTEROL SULFATE 2.5 MG: 2.5 SOLUTION RESPIRATORY (INHALATION) at 20:23

## 2024-12-16 RX ADMIN — POTASSIUM CHLORIDE 10 MEQ: 7.46 INJECTION, SOLUTION INTRAVENOUS at 17:26

## 2024-12-16 RX ADMIN — POTASSIUM CHLORIDE 10 MEQ: 7.46 INJECTION, SOLUTION INTRAVENOUS at 09:11

## 2024-12-16 RX ADMIN — PROPOFOL 50 MG: 10 INJECTION, EMULSION INTRAVENOUS at 13:24

## 2024-12-16 RX ADMIN — BUDESONIDE 500 MCG: 0.5 SUSPENSION RESPIRATORY (INHALATION) at 08:08

## 2024-12-16 RX ADMIN — PROPOFOL 30 MG: 10 INJECTION, EMULSION INTRAVENOUS at 13:39

## 2024-12-16 RX ADMIN — Medication 100 MCG: at 13:27

## 2024-12-16 RX ADMIN — Medication 200 MCG: at 13:42

## 2024-12-16 RX ADMIN — FUROSEMIDE 20 MG: 10 INJECTION, SOLUTION INTRAMUSCULAR; INTRAVENOUS at 08:48

## 2024-12-16 RX ADMIN — PROPOFOL 20 MG: 10 INJECTION, EMULSION INTRAVENOUS at 13:48

## 2024-12-16 RX ADMIN — LIDOCAINE HYDROCHLORIDE 60 MG: 20 INJECTION, SOLUTION INFILTRATION; PERINEURAL at 13:24

## 2024-12-16 RX ADMIN — DEXTROSE MONOHYDRATE 125 ML: 100 INJECTION, SOLUTION INTRAVENOUS at 08:02

## 2024-12-16 RX ADMIN — POTASSIUM CHLORIDE 10 MEQ: 7.46 INJECTION, SOLUTION INTRAVENOUS at 11:11

## 2024-12-16 ASSESSMENT — PAIN SCALES - GENERAL
PAINLEVEL_OUTOF10: 0

## 2024-12-16 ASSESSMENT — ENCOUNTER SYMPTOMS: SHORTNESS OF BREATH: 1

## 2024-12-16 ASSESSMENT — PAIN - FUNCTIONAL ASSESSMENT: PAIN_FUNCTIONAL_ASSESSMENT: 0-10

## 2024-12-16 NOTE — PROGRESS NOTES
CHF Care Plan      Patient's EF (Ejection Fraction) is greater than 40%    Heart Failure Medications:  Diuretics:: Torsemide    (One of the following REQUIRED for EF </= 40%/SYSTOLIC FAILURE but MAY be used in EF% >40%/DIASTOLIC FAILURE)        ACE:: None        ARB:: None         ARNI:: None    (Beta Blockers)  NON- Evidenced Based Beta Blocker (for EF% >40%/DIASTOLIC FAILURE): Metoprolol TARTrate- Lopressor    Evidenced Based Beta Blocker::(REQUIRED for EF% <40%/SYSTOLIC FAILURE) None  ...................................................................................................................................................    Failed to redirect to the Timeline version of the [employer] SmartLink.      Patient's weights and intake/output reviewed    Daily Weight log at bedside, patient/family participation in use of log: \"yes    Patient's current weight today shows a difference of .4 lbs less than last documented weight.      Intake/Output Summary (Last 24 hours) at 12/16/2024 0542  Last data filed at 12/16/2024 0430  Gross per 24 hour   Intake 1595 ml   Output 1100 ml   Net 495 ml       Education Booklet Provided: yes    Comorbidities Reviewed Yes    Patient has a past medical history of NADJA (acute kidney injury) ([facility]), Asthma, Atrial fibrillation with RVR ([facility]), CAD (coronary artery disease), CHF (congestive heart failure) ([facility]), Chronic anxiety, COPD (chronic obstructive pulmonary disease) (HCC), COPD (chronic obstructive pulmonary disease) (HCC), GERD (gastroesophageal reflux disease), Heart attack (HCC), History of blood transfusion, Hyperlipidemia, Hypertension, MRSA (methicillin resistant staph aureus) culture positive, OA (osteoarthritis), and Thyroid disease.     >>For CHF and Comorbidity documentation on Education Time and Topics, please see Education Tab      CHF Education    Learners:  Patient  Readineess:   Eager and Acceptance  Method:   Explanation and Handout  Response:    Verbalizes

## 2024-12-16 NOTE — PROGRESS NOTES
Occupational Therapy  Attempted to see pt for PT/OT treatment. Pt unavailable for therapy as they are off the floor at [facility]. Will continue to follow as schedule allows and pt medically appropriate..     Kerry Bruno, OTR/FELY Kincaid, PTA

## 2024-12-16 NOTE — PROGRESS NOTES
Clip card created, placed in patient floor (blue) record. Updated PACU RN at handoff, discharge instructions placed.

## 2024-12-16 NOTE — CARE COORDINATION
Cm update- Pt is now with NG, worked up for dysphagia. Getting EGD/COlon today. O2 @ 4l/nc, CHF. Will follow

## 2024-12-16 NOTE — ANESTHESIA PRE PROCEDURE
(If Applicable): No results found for: \"PREGTESTUR\", \"PREGSERUM\", \"HCG\", \"HCGQUANT\"     ABGs:   Lab Results   Component Value Date/Time    PHART 7.439 04/05/2022 05:10 AM    PO2ART 83.9 04/05/2022 05:10 AM    DGZ2EUE 38.9 04/05/2022 05:10 AM    TNL6ZVZ 25.8 04/05/2022 05:10 AM    BEART 1.6 04/05/2022 05:10 AM    A7EQIKOP 96.6 04/05/2022 05:10 AM        Type & Screen (If Applicable):  Lab Results   Component Value Date    ABORH CANCELED 02/02/2024    ABORH A POS 02/02/2024    LABANTI NEG 02/02/2024       Drug/Infectious Status (If Applicable):  No results found for: \"HIV\", \"HEPCAB\"    COVID-19 Screening (If Applicable):   Lab Results   Component Value Date/Time    COVID19 NOT DETECTED 12/11/2024 11:29 AM           Anesthesia Evaluation  Patient summary reviewed and Nursing notes reviewed   no history of anesthetic complications:   Airway: Mallampati: III  TM distance: >3 FB   Neck ROM: full  Mouth opening: > = 3 FB   Dental: normal exam   (+) upper dentures      Pulmonary:Negative Pulmonary ROS and normal exam    (+) pneumonia:  COPD:  shortness of breath:         asthma:                           ROS comment: 4L NC   Cardiovascular:Negative CV ROS    (+) hypertension:, valvular problems/murmurs (mild): AS, CAD:, CABG/stent:, dysrhythmias: atrial fibrillation, CHF:, hyperlipidemia      ECG reviewed      Echocardiogram reviewed  Stress test reviewed                Neuro/Psych:   Negative Neuro/Psych ROS  (+) neuromuscular disease:depression/anxiety             GI/Hepatic/Renal: Neg GI/Hepatic/Renal ROS  (+) GERD:, renal disease: ARF     (-) hiatal hernia      ROS comment: Dysphagia, melena.   Endo/Other: Negative Endo/Other ROS   (+) hypothyroidism, blood dyscrasia: anemia, arthritis: OA..                 Abdominal:             Vascular: negative vascular ROS.         Other Findings:        EKG 12/13/24    Baseline artifactSinus rhythm with sinus arrhytjmia and PACsLeft axis deviationPossible  Septal infarct (cited on

## 2024-12-16 NOTE — ANESTHESIA POSTPROCEDURE EVALUATION
Department of Anesthesiology  Postprocedure Note    Patient: Terri Smith  MRN: [id number]  YOB: 1940  Date of evaluation: 12/16/2024    Procedure Summary       Date: 12/16/24 Room / Location: [place] [id number] / [facility]    Anesthesia Start: 1318 Anesthesia Stop: 1355    Procedures:       ESOPHAGOGASTRODUODENOSCOPY      COLONOSCOPY POLYPECTOMY SNARE/BIOPSY      ESOPHAGOGASTRODUODENOSCOPY DILATATION      ESOPHAGOGASTRODUODENOSCOPY CONTROL HEMORRHAGE Diagnosis:       Dysphagia, unspecified type      Melena      (Dysphagia, unspecified type [R13.10])      (Melena [K92.1])    Surgeons: Calvin Lopez MD Responsible Provider: Gerald Boone MD    Anesthesia Type: MAC ASA Status: 3            Anesthesia Type: No value filed.    David Phase I: David Score: 10    David Phase II: David Score: 9    Anesthesia Post Evaluation    Comments: Postoperative Anesthesia Note    Name:    Terri Smith  MRN:      [id number]    Patient Vitals in the past 12 hrs:  12/16/24 1415, BP:123/82, Pulse:80, Resp:14, SpO2:100 %  12/16/24 1414, BP:123/82, Pulse:82, Resp:14, SpO2:100 %  12/16/24 1400, BP:(!) 102/53, Pulse:80, Resp:12, SpO2:100 %  12/16/24 1211, BP:136/77, Temp:97.6 °F (36.4 °C), Temp src:Oral, Pulse:84, Resp:16, SpO2:98 %, Height:1.626 m (5' 4\"), Weight:45.4 kg (100 lb)  12/16/24 1126, Pulse:95, Resp:18, SpO2:97 %  12/16/24 0810, Pulse:82, Resp:18, SpO2:93 %  12/16/24 0800, BP:115/68, Temp:97.7 °F (36.5 °C), Temp src:Axillary, Pulse:82, Resp:18, SpO2:97 %  12/16/24 0501, Weight:45.2 kg (99 lb 10.4 oz)  12/16/24 0430, BP:(!) 145/58, Temp:97.6 °F (36.4 °C), Temp src:Axillary, Pulse:78, Resp:16, SpO2:97 %, Weight:45.6 kg (100 lb 8.5 oz)     LABS:    CBC  Lab Results       Component                Value               Date/Time                  WBC                      8.0                 12/16/2024 08:10 AM        HGB                      8.6 (L)             12/16/2024 08:10 AM

## 2024-12-16 NOTE — PROGRESS NOTES
Speech Language Pathology  Attempt Note     Name: Terri Smith  : 1940  Medical Diagnosis: Shortness of breath [R06.02]  Septicemia (HCC) [A41.9]  Pneumonia, unspecified organism [J18.9]  Pneumonia due to infectious organism, unspecified laterality, unspecified part of lung [J18.9]      SLP attempted to see pt for dysphagia tx, reviewed pt's chart, spoke with RN.  Pt currently NPO for colonoscopy and EGD this date per RN.  ST to continue to follow and re-attempt f/u at a later time as pt is able and appropriate.  RN aware.  No charges.     Danni Carpenter M.S. CCC-SLP  Speech-language pathologist  [phone]

## 2024-12-16 NOTE — PROGRESS NOTES
V2.0    [facility] [place]      Name:  Terri Smith /Age/Sex: 1940  (84 y.o. female)   MRN & CSN:  [id number] & [id number] Encounter Date/Time: 2024 9:51 AM EST   Location:  [facility]044-01 PCP: Jackson Ontiveros MD     Attending:Maurisio Lay DO       Hospital Day: 6    Assessment and Recommendations   Terri Smith is a 84 y.o. female with pmh of atrial fibrillation, CAD, CHF, COPD, GERD, hypothyroidism who presents with Pneumonia, unspecified organism and pulmonary edema.    Interval history: No acute events overnight. VSS on 2 L NC. SLP evaluation yesterday. Recommend NPO. MBSS ordered.     Plan:    Hypoxic and hypercapnic respiratory failure.    Patient currently at baseline O2 requirement 4L NC.   continue mucoid nebulization. Likely secondary to underlying pneumonia, also with hx of underlying COPD.    Continue Roflumilast  Pulmonology consulted. Recommend finishing her abx course Vancomycin for PNA and f/u SLP recs. Pulmonary signed off     Community-acquired pneumonia.   CXR > airspace opacities  Continue IV vancomycin  Repeat chest x-ray  showed progression of airspace opacities. Repeat chest x-ray () showed minimal new opacity in left medial lung base  MRSA screen. Positive  Monitor leukocytosis.  Recurrent choking episode/aspiration  SLP consulted. Plan for possible MBSS. F/u recs.     Paroxysmal atrial fibrillation with rapid ventricular response.  On Metoprolol and Eliquis at home  Eliquis held for now due to suspicion of upper GI bleed  Received Diltiazem 5mg IV x 1 24.   Continue telemetry   Cardio EP consulted > advised to continue current regimen of Metoprolol 12.5mg BID  Continue furosemide 20 mg once daily  sinus tachycardia is multifactorial including pneumonia/SIRS combined with anemia, COPD exacerbation, and volume overload.    Hypoglycemia:  - BG 61 today , improved to 116 s/p D10 125mL bolus  - Will repeat POC after EGD-C-scope today  input(s): \"PROBNP\" in the last 72 hours.    UA:  Lab Results   Component Value Date/Time    NITRU Negative 12/01/2024 10:46 AM    COLORU Yellow 12/01/2024 10:46 AM    PHUR 6.0 12/01/2024 10:46 AM    PHUR 7.0 12/15/2023 06:26 PM    WBCUA 0-2 10/11/2024 11:45 PM    RBCUA 0-2 10/11/2024 11:45 PM    MUCUS Rare 08/30/2023 01:50 PM    YEAST Present 10/12/2021 04:05 AM    BACTERIA 2+ 08/30/2023 01:50 PM    CLARITYU Clear 12/01/2024 10:46 AM    SPECGRAV <1.005 05/07/2013 01:35 PM    LEUKOCYTESUR Negative 12/01/2024 10:46 AM    UROBILINOGEN 0.2 12/01/2024 10:46 AM    BILIRUBINUR Negative 12/01/2024 10:46 AM    BLOODU Negative 12/01/2024 10:46 AM    GLUCOSEU Negative 12/01/2024 10:46 AM    KETUA Negative 12/01/2024 10:46 AM    AMORPHOUS 2+ 07/05/2023 06:11 PM     Urine Cultures:   Lab Results   Component Value Date/Time    LABURIN  08/30/2023 02:09 PM     <10,000 CFU/ml mixed skin/urogenital milad. No further workup     Blood Cultures:   Lab Results   Component Value Date/Time    BC No Growth after 4 days of incubation. 12/11/2024 11:21 AM     Lab Results   Component Value Date/Time    BLOODCULT2 No Growth after 4 days of incubation. 12/11/2024 11:21 AM     Organism:   Lab Results   Component Value Date/Time    ORG Staph aureus MRSA 12/11/2024 06:43 PM         Electronically signed by Christiano Lorenz MD on 12/16/2024 at 7:29 AM

## 2024-12-16 NOTE — PLAN OF CARE
Problem: Chronic Conditions and Co-morbidities  Goal: Patient's chronic conditions and co-morbidity symptoms are monitored and maintained or improved  12/16/2024 1520 by Christiano Vargas, RN  Outcome: Progressing     Problem: Discharge Planning  Goal: Discharge to home or other facility with appropriate resources  12/16/2024 1520 by Christiano Vargas, RN  Outcome: Progressing     Problem: Safety - Adult  Goal: Free from fall injury  12/16/2024 1520 by Christiano Vargas, RN  Outcome: Progressing

## 2024-12-16 NOTE — H&P
Pre-sedation Assessment    History and Physical / Pre-Sedation Assessment  Patient:  Terri Smith   :   1940     Intended Procedure: colon EGD      HPI: CJ, melena,dysphagia  Nurses notes reviewed and agreed.  No current facility-administered medications on file prior to encounter.     Current Outpatient Medications on File Prior to Encounter   Medication Sig Dispense Refill    meclizine (ANTIVERT) 12.5 MG tablet Take 1 tablet by mouth 3 times daily as needed for Dizziness 90 tablet 0    furosemide (LASIX) 20 MG tablet Take 1 tablet by mouth daily 30 tablet 2    metoprolol tartrate (LOPRESSOR) 25 MG tablet Take 0.5 tablets by mouth 2 times daily 60 tablet 1    midodrine (PROAMATINE) 5 MG tablet Take 1 tablet by mouth 2 times daily as needed (SBP < 90 or MAP < 65) 90 tablet 3    budesonide (PULMICORT) 0.5 MG/2ML nebulizer suspension Take 2 mLs by nebulization in the morning and 2 mLs in the evening. 60 each 3    gabapentin (NEURONTIN) 100 MG capsule Take 1 capsule by mouth 3 times daily for 30 days. 90 capsule 0    escitalopram (LEXAPRO) 10 MG tablet Take 1 tablet by mouth daily 30 tablet 3    guaiFENesin (MUCINEX) 600 MG extended release tablet Take 1 tablet by mouth daily 30 tablet 0    fluticasone (FLONASE) 50 MCG/ACT nasal spray 1 spray by Each Nostril route daily as needed for Rhinitis 16 g 3    magnesium oxide (MAG-OX) 400 (240 Mg) MG tablet Take 1 tablet by mouth daily 30 tablet 0    levothyroxine (SYNTHROID) 25 MCG tablet Take 1 tablet by mouth Daily for 120 doses 30 tablet 3    pantoprazole (PROTONIX) 40 MG tablet Take 1 tablet by mouth in the morning and at bedtime 30 tablet 3    potassium chloride (KLOR-CON M) 10 MEQ extended release tablet Take 2 tablets by mouth daily for 5 days 10 tablet 0    ferrous sulfate (IRON 325) 325 (65 Fe) MG tablet Take 1 tablet by mouth daily (with breakfast)      LORazepam (ATIVAN) 0.5 MG tablet Take 1 tablet by mouth every 6 hours as needed for Anxiety.       DILATATION performed by Calvin Lopez MD at [facility] ENDOSCOPY    UPPER GASTROINTESTINAL ENDOSCOPY N/A 12/5/2024    ESOPHAGOGASTRODUODENOSCOPY performed by Rowdy Schmitz DO at [facility] ENDOSCOPY       Allergies:   Allergies   Allergen Reactions    Latex Other (See Comments)     Burning    Other reaction(s): Dermatitis, Other (See Comments)  Burning  Burning      Codeine Other (See Comments)     \"hallucinations\"  Other reaction(s): Other (See Comments)  \"hallucinations\"  Hallucinations    Hallucinations             Physical Exam:  Vital Signs: /77   Pulse 84   Temp 97.6 °F (36.4 °C) (Oral)   Resp 16   Ht 1.626 m (5' 4\")   Wt 45.4 kg (100 lb)   SpO2 98%   BMI 17.16 kg/m²  Body mass index is 17.16 kg/m².  Airway:Normal  Cardiac:Normal  Pulmonary:Normal  Abdomen:Normal  Specific to procedure:       Pre-Procedure Assessment/Plan:  ASA 3 - Patient with moderate systemic disease with functional limitations    Level of Sedation Plan:No sedation  Mallampati 2  Post Procedure plan: Return to same level of care    I assessed the patient and find that the patient is in satisfactory condition to proceed with the planned procedure and sedation plan.    I have explained the risk, benefits, and alternatives to the procedure. The patient understands and agrees to proceed.  Yes    Calvin Lopez MD  1:03 PM 12/16/2024

## 2024-12-16 NOTE — DISCHARGE INSTRUCTIONS
ENDOSCOPIC CLIP INSTRUCTIONS      You have had an Endoscopy today and had two metal clips placed in your esophagus.  If you have to have a MRI please give the laminated card to the MRI technician prior to your procedure.  The card has reccommended settings for MRI after an Endoclip placement.    Thank you.

## 2024-12-16 NOTE — PLAN OF CARE
Problem: Chronic Conditions and Co-morbidities  Goal: Patient's chronic conditions and co-morbidity symptoms are monitored and maintained or improved  Outcome: Progressing     Problem: Discharge Planning  Goal: Discharge to home or other facility with appropriate resources  Outcome: Progressing     Problem: Safety - Adult  Goal: Free from fall injury  Outcome: Progressing     Problem: Pain  Goal: Verbalizes/displays adequate comfort level or baseline comfort level  Outcome: Progressing     Problem: Skin/Tissue Integrity  Goal: Absence of new skin breakdown  Description: 1.  Monitor for areas of redness and/or skin breakdown  2.  Assess vascular access sites hourly  3.  Every 4-6 hours minimum:  Change oxygen saturation probe site  4.  Every 4-6 hours:  If on nasal continuous positive airway pressure, respiratory therapy assess nares and determine need for appliance change or resting period.  Outcome: Progressing     Problem: ABCDS Injury Assessment  Goal: Absence of physical injury  Outcome: Progressing     Problem: Confusion  Goal: Confusion, delirium, dementia, or psychosis is improved or at baseline  Description: INTERVENTIONS:  1. Assess for possible contributors to thought disturbance, including medications, impaired vision or hearing, underlying metabolic abnormalities, dehydration, psychiatric diagnoses, and notify attending LIP  2. [place] high risk fall precautions, as indicated  3. Provide frequent short contacts to provide reality reorientation, refocusing and direction  4. Decrease environmental stimuli, including noise as appropriate  5. Monitor and intervene to maintain adequate nutrition, hydration, elimination, sleep and activity  6. If unable to ensure safety without constant attention obtain sitter and review sitter guidelines with assigned personnel  7. Initiate Psychosocial CNS and Spiritual Care consult, as indicated  Outcome: Progressing     Problem: Safety - Medical Restraint  Goal:  Remains free of injury from restraints (Restraint for Interference with Medical Device)  Description: INTERVENTIONS:  1. Determine that other, less restrictive measures have been tried or would not be effective before applying the restraint  2. Evaluate the patient's condition at the time of restraint application  3. Inform patient/family regarding the reason for restraint  4. Q2H: Monitor safety, psychosocial status, comfort, nutrition and hydration  Outcome: Progressing

## 2024-12-17 ENCOUNTER — APPOINTMENT (OUTPATIENT)
Dept: GENERAL RADIOLOGY | Age: 84
End: 2024-12-17
Payer: MEDICARE

## 2024-12-17 LAB
ANION GAP SERPL CALCULATED.3IONS-SCNC: 13 MMOL/L (ref 3–16)
BASOPHILS # BLD: 0 K/UL (ref 0–0.2)
BASOPHILS NFR BLD: 0.4 %
BUN SERPL-MCNC: 13 MG/DL (ref 7–20)
CALCIUM SERPL-MCNC: 10.1 MG/DL (ref 8.3–10.6)
CHLORIDE SERPL-SCNC: 96 MMOL/L (ref 99–110)
CO2 SERPL-SCNC: 29 MMOL/L (ref 21–32)
CREAT SERPL-MCNC: 0.9 MG/DL (ref 0.6–1.2)
DEPRECATED RDW RBC AUTO: 14.4 % (ref 12.4–15.4)
EOSINOPHIL # BLD: 0.1 K/UL (ref 0–0.6)
EOSINOPHIL NFR BLD: 1.1 %
GFR SERPLBLD CREATININE-BSD FMLA CKD-EPI: 63 ML/MIN/{1.73_M2}
GLUCOSE BLD-MCNC: 121 MG/DL (ref 70–99)
GLUCOSE BLD-MCNC: 156 MG/DL (ref 70–99)
GLUCOSE BLD-MCNC: 56 MG/DL (ref 70–99)
GLUCOSE BLD-MCNC: 73 MG/DL (ref 70–99)
GLUCOSE BLD-MCNC: 81 MG/DL (ref 70–99)
GLUCOSE BLD-MCNC: 91 MG/DL (ref 70–99)
GLUCOSE SERPL-MCNC: 83 MG/DL (ref 70–99)
HCT VFR BLD AUTO: 29.2 % (ref 36–48)
HGB BLD-MCNC: 9.5 G/DL (ref 12–16)
LYMPHOCYTES # BLD: 1.1 K/UL (ref 1–5.1)
LYMPHOCYTES NFR BLD: 13.3 %
MCH RBC QN AUTO: 30.4 PG (ref 26–34)
MCHC RBC AUTO-ENTMCNC: 32.6 G/DL (ref 31–36)
MCV RBC AUTO: 93.2 FL (ref 80–100)
MONOCYTES # BLD: 0.5 K/UL (ref 0–1.3)
MONOCYTES NFR BLD: 6.3 %
NEUTROPHILS # BLD: 6.7 K/UL (ref 1.7–7.7)
NEUTROPHILS NFR BLD: 78.9 %
PERFORMED ON: ABNORMAL
PERFORMED ON: NORMAL
PLATELET # BLD AUTO: 389 K/UL (ref 135–450)
PMV BLD AUTO: 7.4 FL (ref 5–10.5)
POTASSIUM SERPL-SCNC: 3.7 MMOL/L (ref 3.5–5.1)
RBC # BLD AUTO: 3.13 M/UL (ref 4–5.2)
SODIUM SERPL-SCNC: 138 MMOL/L (ref 136–145)
VANCOMYCIN SERPL-MCNC: 18 UG/ML
WBC # BLD AUTO: 8.6 K/UL (ref 4–11)

## 2024-12-17 PROCEDURE — 94640 AIRWAY INHALATION TREATMENT: CPT

## 2024-12-17 PROCEDURE — 80202 ASSAY OF VANCOMYCIN: CPT

## 2024-12-17 PROCEDURE — 94761 N-INVAS EAR/PLS OXIMETRY MLT: CPT

## 2024-12-17 PROCEDURE — 2700000000 HC OXYGEN THERAPY PER DAY

## 2024-12-17 PROCEDURE — 92526 ORAL FUNCTION THERAPY: CPT

## 2024-12-17 PROCEDURE — 2580000003 HC RX 258: Performed by: STUDENT IN AN ORGANIZED HEALTH CARE EDUCATION/TRAINING PROGRAM

## 2024-12-17 PROCEDURE — 6360000002 HC RX W HCPCS: Performed by: STUDENT IN AN ORGANIZED HEALTH CARE EDUCATION/TRAINING PROGRAM

## 2024-12-17 PROCEDURE — 2060000000 HC ICU INTERMEDIATE R&B

## 2024-12-17 PROCEDURE — 2500000003 HC RX 250 WO HCPCS: Performed by: INTERNAL MEDICINE

## 2024-12-17 PROCEDURE — 6360000002 HC RX W HCPCS: Performed by: INTERNAL MEDICINE

## 2024-12-17 PROCEDURE — 6370000000 HC RX 637 (ALT 250 FOR IP): Performed by: STUDENT IN AN ORGANIZED HEALTH CARE EDUCATION/TRAINING PROGRAM

## 2024-12-17 PROCEDURE — 80048 BASIC METABOLIC PNL TOTAL CA: CPT

## 2024-12-17 PROCEDURE — 85025 COMPLETE CBC W/AUTO DIFF WBC: CPT

## 2024-12-17 PROCEDURE — 94660 CPAP INITIATION&MGMT: CPT

## 2024-12-17 PROCEDURE — 6360000002 HC RX W HCPCS

## 2024-12-17 PROCEDURE — 6370000000 HC RX 637 (ALT 250 FOR IP): Performed by: INTERNAL MEDICINE

## 2024-12-17 PROCEDURE — 94669 MECHANICAL CHEST WALL OSCILL: CPT

## 2024-12-17 RX ADMIN — ALBUTEROL SULFATE 2.5 MG: 2.5 SOLUTION RESPIRATORY (INHALATION) at 08:48

## 2024-12-17 RX ADMIN — ALBUTEROL SULFATE 2.5 MG: 2.5 SOLUTION RESPIRATORY (INHALATION) at 11:59

## 2024-12-17 RX ADMIN — BUDESONIDE 500 MCG: 0.5 SUSPENSION RESPIRATORY (INHALATION) at 21:03

## 2024-12-17 RX ADMIN — ALBUTEROL SULFATE 2.5 MG: 2.5 SOLUTION RESPIRATORY (INHALATION) at 20:53

## 2024-12-17 RX ADMIN — FLUTICASONE PROPIONATE 1 SPRAY: 50 SPRAY, METERED NASAL at 14:46

## 2024-12-17 RX ADMIN — ONDANSETRON 4 MG: 2 INJECTION INTRAMUSCULAR; INTRAVENOUS at 07:03

## 2024-12-17 RX ADMIN — METOPROLOL TARTRATE 12.5 MG: 25 TABLET, FILM COATED ORAL at 16:27

## 2024-12-17 RX ADMIN — ARFORMOTEROL TARTRATE 15 MCG: 15 SOLUTION RESPIRATORY (INHALATION) at 20:58

## 2024-12-17 RX ADMIN — DEXTROSE MONOHYDRATE 125 ML: 100 INJECTION, SOLUTION INTRAVENOUS at 02:27

## 2024-12-17 RX ADMIN — VANCOMYCIN HYDROCHLORIDE 500 MG: 500 INJECTION, POWDER, LYOPHILIZED, FOR SOLUTION INTRAVENOUS at 13:09

## 2024-12-17 RX ADMIN — ACETAMINOPHEN 650 MG: 325 TABLET ORAL at 14:46

## 2024-12-17 RX ADMIN — BUSPIRONE HYDROCHLORIDE 10 MG: 5 TABLET ORAL at 15:55

## 2024-12-17 RX ADMIN — SODIUM CHLORIDE, PRESERVATIVE FREE 10 ML: 5 INJECTION INTRAVENOUS at 21:48

## 2024-12-17 RX ADMIN — GABAPENTIN 100 MG: 100 CAPSULE ORAL at 21:44

## 2024-12-17 RX ADMIN — ALBUTEROL SULFATE 2.5 MG: 2.5 SOLUTION RESPIRATORY (INHALATION) at 16:32

## 2024-12-17 RX ADMIN — ARFORMOTEROL TARTRATE 15 MCG: 15 SOLUTION RESPIRATORY (INHALATION) at 08:48

## 2024-12-17 RX ADMIN — APIXABAN 2.5 MG: 5 TABLET, FILM COATED ORAL at 21:44

## 2024-12-17 RX ADMIN — FUROSEMIDE 20 MG: 10 INJECTION, SOLUTION INTRAMUSCULAR; INTRAVENOUS at 10:00

## 2024-12-17 RX ADMIN — ONDANSETRON 4 MG: 2 INJECTION INTRAMUSCULAR; INTRAVENOUS at 14:46

## 2024-12-17 RX ADMIN — BUDESONIDE 500 MCG: 0.5 SUSPENSION RESPIRATORY (INHALATION) at 08:48

## 2024-12-17 RX ADMIN — ATORVASTATIN CALCIUM 40 MG: 40 TABLET, FILM COATED ORAL at 21:44

## 2024-12-17 ASSESSMENT — PAIN DESCRIPTION - LOCATION: LOCATION: HEAD

## 2024-12-17 ASSESSMENT — PAIN SCALES - GENERAL
PAINLEVEL_OUTOF10: 0
PAINLEVEL_OUTOF10: 0
PAINLEVEL_OUTOF10: 5

## 2024-12-17 NOTE — PROGRESS NOTES
Physical Therapy/Occupational Therapy    PT/OT attempted to see pt for follow up therapy session this morning however pt declines to participate d/t fatigue. Educated pt on the importance of mobility however pt continues to decline, stating \"I need to rest\".  Will follow up with pt as schedule allows and per POC.  Thank you,  Kerry Carreon, PT, DPT  Susy Crawford, OTR/L

## 2024-12-17 NOTE — PROGRESS NOTES
Comprehensive Nutrition Assessment    Type and Reason for Visit:  Initial, NPO/clear liquid    Nutrition Recommendations/Plan:   Continue diet per SLP recommendations.  Encourage PO intake as tolerated.   Monitor nutrition adequacy, pertinent labs, bowel habits, wt changes, and clinical progress      Malnutrition Assessment:  Malnutrition Status:  At risk for malnutrition (12/17/24 1303)    Context:  Acute Illness     Findings of the 6 clinical characteristics of malnutrition:  Energy Intake:  50% or less of estimated energy requirements for 5 or more days (NPO from 12/13-17)  Weight Loss:  Mild weight loss     Body Fat Loss:  Mild body fat loss (question natural aging versus malnutrition) Orbital   Muscle Mass Loss:  Mild muscle mass loss Temples (temporalis) (question natural aging versus malnutrition)  Fluid Accumulation:  Unable to assess (severitiy of edema not specified)     Strength:  Not Performed    Nutrition Assessment:    Pt visited for initial + NPO/clear liquid status for ~4 days. Admitted w/ SOB but found to hae PNA. PMH of CAD, CHF, CKD, NADJA, GERD, HTN, and HLD. Pt was NPO yesterday for s/p EGD and diagnostic colonscopy. Per SLP note today, they recommend diet advancement to \"NPO and low volume pleasure feeds of puree (IDDSI 4) and thin liquids (IDDSI 0) via ice chip or tsp only.\" Diet currently ordered as pureed w/ thin liquids. SLP also recommends MBSS. Pt reports that she is feeling \"very hungry\" and that she was eating well PTA. Denies unintentional wt loss or gain from her reported UBW of 113 lbs. Wts appear to be trending down this admit in EMR. Reports nausea this morning but denies emesis, diarrhea, or constipation. Will continue to monitor.    Nutrition Related Findings:    Last BM documented 12/16 (on colace). Active BS. Labs reviewed. Edema documented (BUE and BLE) but severity not listed. Wound Type: Skin Tears (ulnar)       Current Nutrition Intake & Therapies:    Average Meal Intake:  NPO  Average Supplements Intake: None Ordered  ADULT DIET; Dysphagia - Pureed    Anthropometric Measures:  Height: 162.6 cm (5' 4.02\")  Ideal Body Weight (IBW): 120 lbs (55 kg)    Admission Body Weight: 47.3 kg (104 lb 4.4 oz) (bed scale)  Current Body Weight: 44.6 kg (98 lb 5.2 oz), 81.9 % IBW. Weight Source: Bed scale  Current BMI (kg/m2): 16.9  Weight Adjustment For: No Adjustment  BMI Categories: Underweight (BMI less than 22) age over 65    Estimated Daily Nutrient Needs:  Energy Requirements Based On: Kcal/kg  Weight Used for Energy Requirements: Current  Energy (kcal/day): 1,338-1,561 (30-35 kcal/kg CBW)  Weight Used for Protein Requirements: Current  Protein (g/day): 45-54 (1-1.2 g/kg CBW)  Method Used for Fluid Requirements: Other  Fluid (ml/day): < 2,000 mL per CHF recommendations    Nutrition Diagnosis:   Inadequate oral intake related to impaired respiratory function, swallowing difficulty as evidenced by NPO or clear liquid status due to medical condition, BMI    Nutrition Interventions:   Food and/or Nutrient Delivery: Continue Current Diet, Start Oral Nutrition Supplement  Nutrition Education/Counseling: No recommendation at this time  Coordination of Nutrition Care: Continue to monitor while inpatient     Goals:  Goals: PO intake 50% or greater, within 7 days  Type of Goal: New goal     Nutrition Monitoring and Evaluation:   Behavioral-Environmental Outcomes: None Identified  Food/Nutrient Intake Outcomes: Food and Nutrient Intake, Supplement Intake  Physical Signs/Symptoms Outcomes: Biochemical Data, Meal Time Behavior, Weight    Discharge Planning:    Continue current diet     Jo Ramirez RD  Contact: [phone]

## 2024-12-17 NOTE — PROGRESS NOTES
Speech Language Pathology  Dysphagia Treatment/Re-Assessment  Facility/Department: [facility] C4 PCU    Recommendations:  Solid Consistency: Advance to NPO and Low volume pleasure feeds of puree (IDDSI 4)  Liquid Consistency: IDDSI 0 Thin Liquids via ice chip or tsp only  Medication: Meds via alt means of nutrition or crushed in puree as able   *Recommend close monitoring of respiratory status- hold PO and re-consult SLP if s/s aspiration or worsening respiratory status develops   Risk Management: upright for all intake, stay upright for at least 30 mins after intake, small bites/sips, assist feed, 1:1 supervision with intake, oral care 2-3x/day to reduce adverse affects in the event of aspiration, increase physical mobility as able, alternate bites/sips, slow rate of intake, general GERD precautions, general aspiration precautions, and hold PO and contact SLP if s/s of aspiration or worsening respiratory status develop..     **Recommend completion of MBSS to further assess swallow function and safety.    NAME:Terri Smith  : 1940 (84 y.o.)   MRN: [id number]  ROOM: [address] [place] [address]  ADMISSION DATE: 2024  PATIENT DIAGNOSIS(ES): Shortness of breath [R06.02]  Septicemia (HCC) [A41.9]  Pneumonia, unspecified organism [J18.9]  Pneumonia due to infectious organism, unspecified laterality, unspecified part of lung [J18.9]  Allergies   Allergen Reactions    Latex Other (See Comments)     Burning    Other reaction(s): Dermatitis, Other (See Comments)  Burning  Burning      Codeine Other (See Comments)     \"hallucinations\"  Other reaction(s): Other (See Comments)  \"hallucinations\"  Hallucinations    Hallucinations         DATE ONSET: 2024    Pain: The patient does not complain of pain       Current Diet: Diet NPO      Diet Tolerance:  Pt NPO     Dysphagia Treatment and Impressions:  Subjective: Pt seen in room at bedside with RN permission   Behavior / Cognition: Alert and cooperative but appears anxious

## 2024-12-17 NOTE — CARE COORDINATION
CM update- LOS 6-  From [facility]  w/ Pna. Pt has NGT  d/c'd.  EGD - esophageal dilation, clip to GE junction,  colonoscopy polyp/ diverticulosis. Diet puree. Esophogram ordered.     1530- CM met with the pt  and her son and talked to dtr Juan on the phone. When verifying the plan to return to [facility], the pt got upset yelled out NO!  She said they overdosed her and she just slept. She said she wants to go home. She said she'd rather die than go somewhere else. Dtr admitted the pt can be very stubborn and demanding. The dtr expressed how displeased she was when she was sent to [facility] at 1900 to [facility] and they didn't know what to do with her and no therapies over the weekend. The siblings siad they would talk to the pt and see about other options. The pt said she doesn't live alone, her son doesn't work and Yasmin is there. The dtr Juan does work. Cipriano explained to the pt that she is weak and would benefit from the therapy.  CM gave them her number they will call with choices.

## 2024-12-17 NOTE — PLAN OF CARE
Problem: Chronic Conditions and Co-morbidities  Goal: Patient's chronic conditions and co-morbidity symptoms are monitored and maintained or improved  Outcome: Progressing     Problem: Discharge Planning  Goal: Discharge to home or other facility with appropriate resources  Outcome: Progressing     Problem: Safety - Adult  Goal: Free from fall injury  Outcome: Progressing     Problem: Pain  Goal: Verbalizes/displays adequate comfort level or baseline comfort level  Outcome: Progressing     Problem: Skin/Tissue Integrity  Goal: Absence of new skin breakdown  Description: 1.  Monitor for areas of redness and/or skin breakdown  2.  Assess vascular access sites hourly  3.  Every 4-6 hours minimum:  Change oxygen saturation probe site  4.  Every 4-6 hours:  If on nasal continuous positive airway pressure, respiratory therapy assess nares and determine need for appliance change or resting period.  Outcome: Progressing     Problem: ABCDS Injury Assessment  Goal: Absence of physical injury  Outcome: Progressing     Problem: Confusion  Goal: Confusion, delirium, dementia, or psychosis is improved or at baseline  Description: INTERVENTIONS:  1. Assess for possible contributors to thought disturbance, including medications, impaired vision or hearing, underlying metabolic abnormalities, dehydration, psychiatric diagnoses, and notify attending LIP  2. [place] high risk fall precautions, as indicated  3. Provide frequent short contacts to provide reality reorientation, refocusing and direction  4. Decrease environmental stimuli, including noise as appropriate  5. Monitor and intervene to maintain adequate nutrition, hydration, elimination, sleep and activity  6. If unable to ensure safety without constant attention obtain sitter and review sitter guidelines with assigned personnel  7. Initiate Psychosocial CNS and Spiritual Care consult, as indicated  Outcome: Progressing     Problem: Safety - Medical Restraint  Goal:

## 2024-12-17 NOTE — PLAN OF CARE
Problem: Chronic Conditions and Co-morbidities  Goal: Patient's chronic conditions and co-morbidity symptoms are monitored and maintained or improved  12/17/2024 0046 by Duke Lainez RN  Outcome: Progressing  12/16/2024 1520 by Christiano Vargas RN  Outcome: Progressing     Problem: Discharge Planning  Goal: Discharge to home or other facility with appropriate resources  12/17/2024 0046 by Duke Lainez RN  Outcome: Progressing  12/16/2024 1520 by Christiano Vargas RN  Outcome: Progressing     Problem: Safety - Adult  Goal: Free from fall injury  12/17/2024 0046 by Duke Lainez RN  Outcome: Progressing  12/16/2024 1520 by Christiano Vargas RN  Outcome: Progressing     Problem: Pain  Goal: Verbalizes/displays adequate comfort level or baseline comfort level  Outcome: Progressing     Problem: Skin/Tissue Integrity  Goal: Absence of new skin breakdown  Description: 1.  Monitor for areas of redness and/or skin breakdown  2.  Assess vascular access sites hourly  3.  Every 4-6 hours minimum:  Change oxygen saturation probe site  4.  Every 4-6 hours:  If on nasal continuous positive airway pressure, respiratory therapy assess nares and determine need for appliance change or resting period.  Outcome: Progressing     Problem: ABCDS Injury Assessment  Goal: Absence of physical injury  Outcome: Progressing     Problem: Confusion  Goal: Confusion, delirium, dementia, or psychosis is improved or at baseline  Description: INTERVENTIONS:  1. Assess for possible contributors to thought disturbance, including medications, impaired vision or hearing, underlying metabolic abnormalities, dehydration, psychiatric diagnoses, and notify attending LIP  2. [place] high risk fall precautions, as indicated  3. Provide frequent short contacts to provide reality reorientation, refocusing and direction  4. Decrease environmental stimuli, including noise as appropriate  5. Monitor and intervene to maintain adequate nutrition,

## 2024-12-17 NOTE — PROGRESS NOTES
PROGRESS NOTE    HPI: Terri Smith is a(n)84 y.o. female admitted for work-up and treatment for Shortness of breath [R06.02]  Septicemia (HCC) [A41.9]  Pneumonia, unspecified organism [J18.9]  Pneumonia due to infectious organism, unspecified laterality, unspecified part of lung [J18.9].     We are following for melena, dysphagia.    Subjective:     She has been n.p.o. today awaiting MBS.  No acute events reported overnight.      Objective:     I/O last 3 completed shifts:  In: 1570 [NG/GT:1570]  Out: 800 [Stool:800]      /66   Pulse 91   Temp 97.4 °F (36.3 °C) (Oral)   Resp 18   Ht 1.626 m (5' 4.02\")   Wt 44.6 kg (98 lb 5.2 oz)   SpO2 100%   BMI 16.87 kg/m²     Physical Exam:  HEENT: anicteric sclera, oropharyngeal membranes pink and moist.  Cor: RRR  Lungs: non-labored at rest, 3 L O2  Abdomen: soft, NT. No ascites.  No hepatomegaly or splenomegaly  Extremities: no edema  Neuro: alert and oriented x 3      Results:   Lab Results   Component Value Date    ALT <5 (L) 12/15/2024    AST 19 12/15/2024    ALKPHOS 67 12/15/2024    BILIDIR <0.1 11/22/2024    INR 1.21 (H) 12/01/2024    LIPASE 26.0 12/11/2024     Lab Results   Component Value Date    WBC 8.6 12/17/2024    HGB 9.5 (L) 12/17/2024    HCT 29.2 (L) 12/17/2024    MCV 93.2 12/17/2024     12/17/2024     BUN/Cr/glu/ALT/AST/amyl/lip:  13/0.9/--/--/--/--/-- (12/17 0630)  XR ABDOMEN FOR NG/OG/NE TUBE PLACEMENT    Result Date: 12/15/2024  NG tube is in the stomach.     XR ABDOMEN FOR NG/OG/NE TUBE PLACEMENT    Result Date: 12/15/2024  1. Orogastric tube in place, with distal tip overlying the distal esophagus. It should be advanced another 15 cm. 2. No acute cardiopulmonary process.     XR CHEST PORTABLE    Result Date: 12/14/2024  Minimal new opacities of the medial left lung base could represent pneumonia/aspiration in the appropriate clinical setting. Chronic bilateral interstitial  opacities.     XR CHEST PORTABLE    Result Date: 12/12/2024  Slight interval progression of diffuse reticular opacities throughout both lungs, which could represent mild interstitial pulmonary edema or acute interstitial pneumonia.     XR CHEST PORTABLE    Result Date: 12/11/2024  Increased linear opacities in both lungs could represent pulmonary edema or atypical infection         Impression:  84-year-old female with past medical history of HTN, COPD, chronic O2 dependence, vertigo, CAD s/p CABG 2018, CHF, CKD, DM2, A-fib on Eliquis, dysphagia, Schatzki's ring s/p dilation and 9/2024 anxiety, depression and GERD who presented to the ER with shortness of breath.  She was discharged on 12/6 for similar presentation.  She was admitted with hypoxic respiratory failure, pneumonia and A-fib with RVR.     Melena, heme positive stool.  S/p EGD and colonoscopy yesterday - With findings of a visible vessel at the GEJ, nonbleeding.  Endo Clip was applied.  Small colon polyp removed on colonoscopy, path-tubular adenoma.  -Overt bleeding is clinically resolved.  Hgb stable.    2.  Dysphagia s/p dilation with 60 Fr Da Silva dilator yesterday.  Speech is planning a repeat MBS today.     3.  Sinus tachycardia.  Seen by EP.  Signed off.     4.  Hypoxic respiratory failure.  Pneumonia.    Plan:  Continue PPI twice daily.  2 Diet as tolerated once cleared by speech therapy.  If evidence of rebleeding will plan for a capsule endoscopy.  Can restart Eliquis if needed.    Please do not hesitate to call with questions or concerns.      Electronically signed by: CLIFTON Melchor 12/17/2024 2:47 PM     (Office) [phone]  (Fax) [phone]  Available via perfect serve

## 2024-12-17 NOTE — PROGRESS NOTES
12/16/24 2228   NIV Type   $NIV $Daily Charge   NIV Started/Stopped On   Equipment Type v60   Mode Bilevel   Mask Type Full face mask   Mask Size Small   Assessment   Pulse 99   Respirations 28   SpO2 97 %   Comfort Level Good   Using Accessory Muscles No   Mask Compliance Good   Skin Assessment Clean, dry, & intact   Skin Protection for O2 Device Yes   Orientation Bilateral;Middle   Location Nose   Settings/Measurements   IPAP 12 cmH20   CPAP/EPAP 6 cmH2O   Vt (Measured) 374 mL   Rate Ordered 8   FiO2  35 %   I Time/ I Time % 1 s   Minute Volume (L/min) 10 Liters   Mask Leak (lpm) 15 lpm   Patient's Home Machine No   Alarm Settings   Alarms On Y   Low Pressure (cmH2O) 6 cmH2O   High Pressure (cmH2O) 30 cmH2O   Apnea (secs) 20 secs   RR Low (bpm) 6   RR High (bpm) 40 br/min

## 2024-12-17 NOTE — RT PROTOCOL NOTE
RT Nebulizer Bronchodilator Protocol Note    There is a bronchodilator order in the chart from a provider indicating to follow the RT Bronchodilator Protocol and there is an “Initiate RT Bronchodilator Protocol” order as well (see protocol at bottom of note).    CXR Findings:  No results found.    The findings from the last RT Protocol Assessment were as follows:  Smoking: Chronic pulmonary disease  Respiratory Pattern: Mild dyspnea at rest, irregular pattern, or RR 21-25 bpm  Breath Sounds: Slightly diminished and/or crackles  Cough: Strong, spontaneous, non-productive  Indication for Bronchodilator Therapy: On home bronchodilators, Wheezing associated with pulm disorder, Decreased or absent breath sounds  Bronchodilator Assessment Score: 8    Aerosolized bronchodilator medication orders have been revised according to the RT Nebulizer Bronchodilator Protocol below.    Respiratory Therapist to perform RT Therapy Protocol Assessment initially then follow the protocol.  Repeat RT Therapy Protocol Assessment PRN for score 0-3 or on second treatment, BID, and PRN for scores above 3.    No Indications - adjust the frequency to every 6 hours PRN wheezing or bronchospasm, if no treatments needed after 48 hours then discontinue using Per Protocol order mode.     If indication present, adjust the RT bronchodilator orders based on the Bronchodilator Assessment Score as indicated below.  If a patient is on this medication at home then do not decrease Frequency below that used at home.    0-3 - enter or revise RT bronchodilator order(s) to equivalent RT Bronchodilator order with Frequency of every 4 hours PRN for wheezing or increased work of breathing using Per Protocol order mode.       4-6 - enter or revise RT Bronchodilator order(s) to two equivalent RT bronchodilator orders with one order with BID Frequency and one order with Frequency of every 4 hours PRN wheezing or increased work of breathing using Per Protocol order

## 2024-12-17 NOTE — PROCEDURES
[address] [address]                             PROCEDURE NOTE      PATIENT NAME: SHARON HANEY           : 1940  MED REC NO: [id number]                      ROOM: 0442  ACCOUNT NO: [id number]                       ADMIT DATE: 2024  PROVIDER: Calvin Lopez MD      DATE OF PROCEDURE:  2024    SURGEON:  Calvin Lopez MD    PROCEDURE:  Esophagogastroduodenoscopy and colonoscopy.    PREPROCEDURE DIAGNOSES:    1. Anemia with acute drop in hemoglobin.  2. Reported melena.  3. Dysphagia.    POSTPROCEDURE DIAGNOSES:    1. No evidence of Lubin's.  2. Schatzki ring.  3. Visible vessel at GE junction, non-bleeding but Endo-clips placed.  4. Small colon polyp.  5. Diverticulosis.    INDICATION:  Patient is an 84-year-old female who reports melena, has had a drop in hemoglobin of approximately 4 g, is on Eliquis, but this has been held.  She also complains of dysphagia.  EGD and colonoscopy are being performed.  Consent was obtained.    PROCEDURE DESCRIPTION:  Patient was sedated per Anesthesia.  She underwent continuous blood pressure, oximetry, and cardiac monitoring.  The Olympus video endoscope was passed under direct vision into the esophagus.  Esophagus was normal at 39 cm at the GE junction.  There was a Schatzki ring and just proximal to this, there appeared to be a raised visible vessel, it was non-bleeding and even with bumping it did not bleed.  There was no evidence of Lubin's.  Stomach was visualized both in antegrade and retrograde views, was unremarkable.  There was no evidence of fresh or old blood.  The pylorus was patent.  The duodenum was normal to the second portion.  Again, no evidence of fresh or old blood.  The scope was withdrawn.  She was then dilated with a 60-[place] Da Silva dilator.  Then, reintroduced the gastroscope and an Endoclip was placed on what appeared to be a visible vessel

## 2024-12-17 NOTE — PROGRESS NOTES
12/01/2024 10:46 AM    COLORU Yellow 12/01/2024 10:46 AM    PHUR 6.0 12/01/2024 10:46 AM    PHUR 7.0 12/15/2023 06:26 PM    WBCUA 0-2 10/11/2024 11:45 PM    RBCUA 0-2 10/11/2024 11:45 PM    MUCUS Rare 08/30/2023 01:50 PM    YEAST Present 10/12/2021 04:05 AM    BACTERIA 2+ 08/30/2023 01:50 PM    CLARITYU Clear 12/01/2024 10:46 AM    SPECGRAV <1.005 05/07/2013 01:35 PM    LEUKOCYTESUR Negative 12/01/2024 10:46 AM    UROBILINOGEN 0.2 12/01/2024 10:46 AM    BILIRUBINUR Negative 12/01/2024 10:46 AM    BLOODU Negative 12/01/2024 10:46 AM    GLUCOSEU Negative 12/01/2024 10:46 AM    KETUA Negative 12/01/2024 10:46 AM    AMORPHOUS 2+ 07/05/2023 06:11 PM     Urine Cultures:   Lab Results   Component Value Date/Time    LABURIN  08/30/2023 02:09 PM     <10,000 CFU/ml mixed skin/urogenital milad. No further workup     Blood Cultures:   Lab Results   Component Value Date/Time    BC No Growth after 4 days of incubation. 12/11/2024 11:21 AM     Lab Results   Component Value Date/Time    BLOODCULT2 No Growth after 4 days of incubation. 12/11/2024 11:21 AM     Organism:   Lab Results   Component Value Date/Time    ORG Staph aureus MRSA 12/11/2024 06:43 PM         Electronically signed by Christiano Lorenz MD on 12/17/2024 at 8:15 AM

## 2024-12-17 NOTE — PROGRESS NOTES
CHF Care Plan      Patient's EF (Ejection Fraction) is greater than 40%    Heart Failure Medications:  Diuretics:: Torsemide    (One of the following REQUIRED for EF </= 40%/SYSTOLIC FAILURE but MAY be used in EF% >40%/DIASTOLIC FAILURE)        ACE:: None        ARB:: None         ARNI:: None    (Beta Blockers)  NON- Evidenced Based Beta Blocker (for EF% >40%/DIASTOLIC FAILURE): Metoprolol TARTrate- Lopressor    Evidenced Based Beta Blocker::(REQUIRED for EF% <40%/SYSTOLIC FAILURE) None  ...................................................................................................................................................    Failed to redirect to the Timeline version of the [employer] SmartLink.      Patient's weights and intake/output reviewed    Daily Weight log at bedside, patient/family participation in use of log: no    Patient's current weight today shows a difference of 2 lbs less than last documented weight.    No intake or output data in the 24 hours ending 12/17/24 0825    Education Booklet Provided: yes    Comorbidities Reviewed Yes    Patient has a past medical history of NADJA (acute kidney injury) ([facility]), Asthma, Atrial fibrillation with RVR ([facility]), CAD (coronary artery disease), CHF (congestive heart failure) ([facility]), Chronic anxiety, COPD (chronic obstructive pulmonary disease) ([facility]), COPD (chronic obstructive pulmonary disease) ([facility]), GERD (gastroesophageal reflux disease), Heart attack ([facility]), History of blood transfusion, Hyperlipidemia, Hypertension, MRSA (methicillin resistant staph aureus) culture positive, OA (osteoarthritis), and Thyroid disease.     >>For CHF and Comorbidity documentation on Education Time and Topics, please see Education Tab      CHF Education    Learners:  Patient  Readineess:   Acceptance  Method:   Explanation  Response:    Verbalizes Understanding    Comments: none    Time Spent: 10 minutes      Pt resting in bed at this time on  3 L O2. Pt denies shortness of breath. Pt

## 2024-12-17 NOTE — PROGRESS NOTES
12/17/24 1639   NIV Type   $NIV $Daily Charge   NIV Started/Stopped On   Equipment Type v60   Mode Bilevel   Mask Size Small   Assessment   Respirations 26   SpO2 100 %   Comfort Level Good   Using Accessory Muscles No   Mask Compliance Good   Skin Protection for O2 Device Yes   Orientation Middle   Location Nose   Breath Sounds   Right Upper Lobe Diminished   Right Middle Lobe Diminished   Right Lower Lobe Diminished   Left Upper Lobe Diminished   Left Lower Lobe Diminished   Settings/Measurements   PIP Observed 12 cm H20   IPAP 12 cmH20   CPAP/EPAP 6 cmH2O   Vt (Measured) 380 mL   Rate Ordered 8   FiO2  35 %   I Time/ I Time % 1 s   Minute Volume (L/min) 10.7 Liters   Mask Leak (lpm) 10 lpm   Patient's Home Machine No   Alarm Settings   Alarms On Y   Low Pressure (cmH2O) 6 cmH2O   High Pressure (cmH2O) 30 cmH2O   Apnea (secs) 20 secs   RR Low (bpm) 6   RR High (bpm) 40 br/min

## 2024-12-18 ENCOUNTER — APPOINTMENT (OUTPATIENT)
Dept: GENERAL RADIOLOGY | Age: 84
End: 2024-12-18
Payer: MEDICARE

## 2024-12-18 PROBLEM — J96.01 ACUTE HYPOXIC RESPIRATORY FAILURE: Status: ACTIVE | Noted: 2024-12-18

## 2024-12-18 LAB
ANION GAP SERPL CALCULATED.3IONS-SCNC: 10 MMOL/L (ref 3–16)
BASOPHILS # BLD: 0 K/UL (ref 0–0.2)
BASOPHILS NFR BLD: 0.3 %
BUN SERPL-MCNC: 13 MG/DL (ref 7–20)
CALCIUM SERPL-MCNC: 9.6 MG/DL (ref 8.3–10.6)
CHLORIDE SERPL-SCNC: 97 MMOL/L (ref 99–110)
CO2 SERPL-SCNC: 33 MMOL/L (ref 21–32)
CREAT SERPL-MCNC: 1.3 MG/DL (ref 0.6–1.2)
DEPRECATED RDW RBC AUTO: 15 % (ref 12.4–15.4)
EOSINOPHIL # BLD: 0.2 K/UL (ref 0–0.6)
EOSINOPHIL NFR BLD: 2.5 %
GFR SERPLBLD CREATININE-BSD FMLA CKD-EPI: 41 ML/MIN/{1.73_M2}
GLUCOSE BLD-MCNC: 101 MG/DL (ref 70–99)
GLUCOSE BLD-MCNC: 124 MG/DL (ref 70–99)
GLUCOSE BLD-MCNC: 154 MG/DL (ref 70–99)
GLUCOSE BLD-MCNC: 157 MG/DL (ref 70–99)
GLUCOSE BLD-MCNC: 84 MG/DL (ref 70–99)
GLUCOSE BLD-MCNC: 96 MG/DL (ref 70–99)
GLUCOSE SERPL-MCNC: 94 MG/DL (ref 70–99)
HCT VFR BLD AUTO: 29.4 % (ref 36–48)
HGB BLD-MCNC: 9.8 G/DL (ref 12–16)
LYMPHOCYTES # BLD: 1.1 K/UL (ref 1–5.1)
LYMPHOCYTES NFR BLD: 13.7 %
MCH RBC QN AUTO: 31.1 PG (ref 26–34)
MCHC RBC AUTO-ENTMCNC: 33.3 G/DL (ref 31–36)
MCV RBC AUTO: 93.4 FL (ref 80–100)
MONOCYTES # BLD: 0.6 K/UL (ref 0–1.3)
MONOCYTES NFR BLD: 7.1 %
NEUTROPHILS # BLD: 6.3 K/UL (ref 1.7–7.7)
NEUTROPHILS NFR BLD: 76.4 %
PERFORMED ON: ABNORMAL
PERFORMED ON: NORMAL
PERFORMED ON: NORMAL
PLATELET # BLD AUTO: 352 K/UL (ref 135–450)
PMV BLD AUTO: 7.6 FL (ref 5–10.5)
POTASSIUM SERPL-SCNC: 3.1 MMOL/L (ref 3.5–5.1)
RBC # BLD AUTO: 3.15 M/UL (ref 4–5.2)
SODIUM SERPL-SCNC: 140 MMOL/L (ref 136–145)
VANCOMYCIN SERPL-MCNC: 15.1 UG/ML
WBC # BLD AUTO: 8.3 K/UL (ref 4–11)

## 2024-12-18 PROCEDURE — 85025 COMPLETE CBC W/AUTO DIFF WBC: CPT

## 2024-12-18 PROCEDURE — 6360000002 HC RX W HCPCS: Performed by: INTERNAL MEDICINE

## 2024-12-18 PROCEDURE — 2700000000 HC OXYGEN THERAPY PER DAY

## 2024-12-18 PROCEDURE — 80048 BASIC METABOLIC PNL TOTAL CA: CPT

## 2024-12-18 PROCEDURE — 97530 THERAPEUTIC ACTIVITIES: CPT

## 2024-12-18 PROCEDURE — 94640 AIRWAY INHALATION TREATMENT: CPT

## 2024-12-18 PROCEDURE — 6360000002 HC RX W HCPCS: Performed by: STUDENT IN AN ORGANIZED HEALTH CARE EDUCATION/TRAINING PROGRAM

## 2024-12-18 PROCEDURE — 6370000000 HC RX 637 (ALT 250 FOR IP): Performed by: STUDENT IN AN ORGANIZED HEALTH CARE EDUCATION/TRAINING PROGRAM

## 2024-12-18 PROCEDURE — 80202 ASSAY OF VANCOMYCIN: CPT

## 2024-12-18 PROCEDURE — 94660 CPAP INITIATION&MGMT: CPT

## 2024-12-18 PROCEDURE — 92526 ORAL FUNCTION THERAPY: CPT

## 2024-12-18 PROCEDURE — 6370000000 HC RX 637 (ALT 250 FOR IP): Performed by: INTERNAL MEDICINE

## 2024-12-18 PROCEDURE — 5A09357 ASSISTANCE WITH RESPIRATORY VENTILATION, LESS THAN 24 CONSECUTIVE HOURS, CONTINUOUS POSITIVE AIRWAY PRESSURE: ICD-10-PCS | Performed by: STUDENT IN AN ORGANIZED HEALTH CARE EDUCATION/TRAINING PROGRAM

## 2024-12-18 PROCEDURE — 74230 X-RAY XM SWLNG FUNCJ C+: CPT

## 2024-12-18 PROCEDURE — 97535 SELF CARE MNGMENT TRAINING: CPT

## 2024-12-18 PROCEDURE — 2580000003 HC RX 258: Performed by: STUDENT IN AN ORGANIZED HEALTH CARE EDUCATION/TRAINING PROGRAM

## 2024-12-18 PROCEDURE — 2060000000 HC ICU INTERMEDIATE R&B

## 2024-12-18 PROCEDURE — 92611 MOTION FLUOROSCOPY/SWALLOW: CPT

## 2024-12-18 PROCEDURE — 6360000002 HC RX W HCPCS

## 2024-12-18 PROCEDURE — 94761 N-INVAS EAR/PLS OXIMETRY MLT: CPT

## 2024-12-18 PROCEDURE — 97110 THERAPEUTIC EXERCISES: CPT

## 2024-12-18 PROCEDURE — 2500000003 HC RX 250 WO HCPCS: Performed by: INTERNAL MEDICINE

## 2024-12-18 PROCEDURE — 94669 MECHANICAL CHEST WALL OSCILL: CPT

## 2024-12-18 PROCEDURE — 36415 COLL VENOUS BLD VENIPUNCTURE: CPT

## 2024-12-18 RX ORDER — POTASSIUM CHLORIDE 7.45 MG/ML
10 INJECTION INTRAVENOUS
Status: COMPLETED | OUTPATIENT
Start: 2024-12-18 | End: 2024-12-18

## 2024-12-18 RX ORDER — SODIUM CHLORIDE 9 MG/ML
INJECTION, SOLUTION INTRAVENOUS CONTINUOUS
Status: ACTIVE | OUTPATIENT
Start: 2024-12-18 | End: 2024-12-18

## 2024-12-18 RX ADMIN — POTASSIUM CHLORIDE 10 MEQ: 7.46 INJECTION, SOLUTION INTRAVENOUS at 10:37

## 2024-12-18 RX ADMIN — METOPROLOL TARTRATE 12.5 MG: 25 TABLET, FILM COATED ORAL at 22:22

## 2024-12-18 RX ADMIN — LEVOTHYROXINE SODIUM 25 MCG: 0.03 TABLET ORAL at 05:36

## 2024-12-18 RX ADMIN — ROFLUMILAST 500 MCG: 500 TABLET ORAL at 11:38

## 2024-12-18 RX ADMIN — ONDANSETRON 4 MG: 2 INJECTION INTRAMUSCULAR; INTRAVENOUS at 09:28

## 2024-12-18 RX ADMIN — ARFORMOTEROL TARTRATE 15 MCG: 15 SOLUTION RESPIRATORY (INHALATION) at 19:36

## 2024-12-18 RX ADMIN — ALBUTEROL SULFATE 2.5 MG: 2.5 SOLUTION RESPIRATORY (INHALATION) at 08:53

## 2024-12-18 RX ADMIN — SODIUM CHLORIDE: 9 INJECTION, SOLUTION INTRAVENOUS at 08:38

## 2024-12-18 RX ADMIN — METOPROLOL TARTRATE 12.5 MG: 25 TABLET, FILM COATED ORAL at 08:35

## 2024-12-18 RX ADMIN — ACETAMINOPHEN 650 MG: 325 TABLET ORAL at 05:36

## 2024-12-18 RX ADMIN — SODIUM CHLORIDE: 9 INJECTION, SOLUTION INTRAVENOUS at 18:57

## 2024-12-18 RX ADMIN — ATORVASTATIN CALCIUM 40 MG: 40 TABLET, FILM COATED ORAL at 22:22

## 2024-12-18 RX ADMIN — BUDESONIDE 500 MCG: 0.5 SUSPENSION RESPIRATORY (INHALATION) at 19:36

## 2024-12-18 RX ADMIN — ALBUTEROL SULFATE 2.5 MG: 2.5 SOLUTION RESPIRATORY (INHALATION) at 12:32

## 2024-12-18 RX ADMIN — ALBUTEROL SULFATE 2.5 MG: 2.5 SOLUTION RESPIRATORY (INHALATION) at 19:36

## 2024-12-18 RX ADMIN — POTASSIUM CHLORIDE 10 MEQ: 7.46 INJECTION, SOLUTION INTRAVENOUS at 13:13

## 2024-12-18 RX ADMIN — ESCITALOPRAM OXALATE 10 MG: 10 TABLET ORAL at 08:36

## 2024-12-18 RX ADMIN — SODIUM CHLORIDE, PRESERVATIVE FREE 10 ML: 5 INJECTION INTRAVENOUS at 08:36

## 2024-12-18 RX ADMIN — POTASSIUM CHLORIDE 10 MEQ: 7.46 INJECTION, SOLUTION INTRAVENOUS at 08:40

## 2024-12-18 RX ADMIN — PANTOPRAZOLE SODIUM 40 MG: 40 TABLET, DELAYED RELEASE ORAL at 05:36

## 2024-12-18 RX ADMIN — GABAPENTIN 100 MG: 100 CAPSULE ORAL at 22:22

## 2024-12-18 RX ADMIN — BUDESONIDE 500 MCG: 0.5 SUSPENSION RESPIRATORY (INHALATION) at 08:53

## 2024-12-18 RX ADMIN — ACETAMINOPHEN 650 MG: 325 TABLET ORAL at 18:50

## 2024-12-18 RX ADMIN — ALBUTEROL SULFATE 2.5 MG: 2.5 SOLUTION RESPIRATORY (INHALATION) at 15:52

## 2024-12-18 RX ADMIN — POTASSIUM CHLORIDE 10 MEQ: 7.46 INJECTION, SOLUTION INTRAVENOUS at 11:40

## 2024-12-18 RX ADMIN — GUAIFENESIN 600 MG: 600 TABLET ORAL at 08:35

## 2024-12-18 RX ADMIN — GABAPENTIN 100 MG: 100 CAPSULE ORAL at 16:40

## 2024-12-18 RX ADMIN — DOCUSATE SODIUM 100 MG: 100 CAPSULE, LIQUID FILLED ORAL at 08:34

## 2024-12-18 RX ADMIN — APIXABAN 2.5 MG: 5 TABLET, FILM COATED ORAL at 22:22

## 2024-12-18 RX ADMIN — APIXABAN 2.5 MG: 5 TABLET, FILM COATED ORAL at 08:36

## 2024-12-18 RX ADMIN — GABAPENTIN 100 MG: 100 CAPSULE ORAL at 08:35

## 2024-12-18 ASSESSMENT — PAIN DESCRIPTION - LOCATION: LOCATION: EAR

## 2024-12-18 ASSESSMENT — PAIN DESCRIPTION - ORIENTATION: ORIENTATION: RIGHT

## 2024-12-18 ASSESSMENT — PAIN SCALES - GENERAL
PAINLEVEL_OUTOF10: 1
PAINLEVEL_OUTOF10: 0
PAINLEVEL_OUTOF10: 0

## 2024-12-18 ASSESSMENT — PAIN DESCRIPTION - DESCRIPTORS: DESCRIPTORS: ACHING

## 2024-12-18 NOTE — PROGRESS NOTES
Physical Therapy  Facility/Department: [place] [id number] [facility]  Daily Treatment Note  NAME: Terri Smith  : 1940  MRN: [id number]    Date of Service: 2024    Discharge Recommendations:  Subacute/Skilled Nursing Facility   PT Equipment Recommendations  Equipment Needed: No  Other: TBD at [facility]    Patient Diagnosis(es): The primary encounter diagnosis was Pneumonia due to infectious organism, unspecified laterality, unspecified part of lung. Diagnoses of Shortness of breath, Septicemia (HCC), Dysphagia, unspecified type, and Melena were also pertinent to this visit.    Assessment  Assessment: Pt tolerated treatment session fairly this date. Pt performed bed mobility min A and STS transfers Min A w/ HHA. Pt is unsteady when standing and continues to keep eyes close throughout session as pt states \"she can't see anyway\". Pt completes SPT from bed to recliner w/ Min A but is impulsive doing so, not waiting for lines to be properly managed. Pt tolerated BLE strengthening well in sitting. Pt continues to be below their baseline and will continue to benefit from skilled PT intervention in order to address deficits, reach goals, and d/c safely.  Activity Tolerance: Patient limited by fatigue;Patient limited by endurance  Equipment Needed: No  Other: TBD at [facility]    Plan  Physical Therapy Plan  General Plan: 3-5 times per week  Specific Instructions for Next Treatment: Progress ther ex and mobility as tolerated  Current Treatment Recommendations: Strengthening;ROM;Balance training;Functional mobility training;Transfer training;Endurance training;Gait training;Home exercise program;Safety education & training;Patient/Caregiver education & training;Equipment evaluation, education, & procurement;Therapeutic activities;Co-Treatment;Positioning    Restrictions  Restrictions/Precautions  Restrictions/Precautions: Fall Risk, General Precautions, Contact Precautions  Position Activity Restriction  Other Position/Activity  Moderate assistance;Additional time;Adaptive equipment (EOB > stand w/ HHA)  Stand to Sit: Min assistance;Adaptive equipment;Additional time  Stand Pivot Transfers: Min assistance;Additional time (HHA)  Bed to Chair: Min assistance;Additional time (stand pivot EOB to chair)  Gait  Gait Training: No     PT Exercises  Exercise Treatment: BLE strengthening in sitting: Marching, LAQ's, Heel Raises, Toe Raises, 20x     Safety Devices  Type of Devices: All fall risk precautions in place;Call light within reach;Patient at risk for falls;Left in bed;Nurse notified;All danna prominences offloaded;Gait belt;Bed alarm in place      Goals  Short Term Goals  Time Frame for Short Term Goals: 1 week, 12/19/24 (unless otherwise specified), 12/18 conitnue goals  Short Term Goal 1: Pt will transfer supine <-> sit with supervision  Short Term Goal 2: Pt will transfer sit <-> stand and bed>chair using LRAD with CGA x 1  Short Term Goal 3: Pt will ambulate x 40 feet using LRAD with Aimee x 1  Short Term Goal 4: By 12/15/24: Pt will tolerate 12-15 reps BLE exercise for strengthening, balance, and endurance -12/18 Goal Met  Patient Goals   Patient Goals : \"To get stronger\"    Education  Patient Education  Education Given To: Patient;Family  Education Provided: Role of Therapy;Plan of Care;Precautions;Transfer Training;Fall Prevention Strategies;Mobility Training;Orientation  Education Provided Comments: Pt educated on role of PT in acute setting and benefits of regular OOB activity in order to maintain/improve strength and lessen likelihood of developing hospital-acquired weakness; pt verbalizes understanding.  Education Method: Demonstration;Verbal  Barriers to Learning: Vision  Education Outcome: Verbalized understanding;Demonstrated understanding;Continued education needed    AM-PAC - Mobility    AM-PAC Basic Mobility - Inpatient   How much help is needed turning from your back to your side while in a flat bed without using bedrails?: A

## 2024-12-18 NOTE — PROGRESS NOTES
Temp: 97.3 °F (36.3 °C)      TempSrc: Axillary  Oral    SpO2: 100% 100%     Weight:    43.2 kg (95 lb 3.8 oz)   Height:             Physical Exam:      General: NAD, elderly, frail  Eyes: EOMI, mucous membranes moist  ENT: neck supple  Cardiovascular: RRR. Neg M/R/G  Respiratory: Clear to auscultation bilat, aside from mild inspiratory upper airway sounds. Neg wheezes, rales, rhonchi.   Gastrointestinal: Soft, non tender, bowel sounds decreased  Musculoskeletal: Neg bilat pitting edema  Skin: warm, dry  Neuro: Alert, oriented, follows commands.   Psych: Mood appropriate.     Medications:   Medications:    vancomycin  500 mg IntraVENous Q24H    pantoprazole  40 mg Oral QAM AC    furosemide  20 mg IntraVENous Daily    arformoterol tartrate  15 mcg Nebulization BID RT    lidocaine 1 % injection  50 mg IntraDERmal Once    albuterol  2.5 mg Nebulization Q4H WA RT    atorvastatin  40 mg Oral Nightly    budesonide  0.5 mg Nebulization BID RT    docusate sodium  100 mg Oral Daily    apixaban  2.5 mg Oral BID    escitalopram  10 mg Oral Daily    gabapentin  100 mg Oral TID    guaiFENesin  600 mg Oral Daily    levothyroxine  25 mcg Oral Daily    metoprolol tartrate  12.5 mg Oral BID    Roflumilast  500 mcg Oral Daily    sodium chloride flush  5-40 mL IntraVENous 2 times per day      Infusions:    dextrose      sodium chloride 20 mL/hr at 12/14/24 2332    sodium chloride 10 mL (12/13/24 1314)     PRN Meds: glucose, 4 tablet, PRN  dextrose bolus, 125 mL, PRN   Or  dextrose bolus, 250 mL, PRN  glucagon (rDNA), 1 mg, PRN  dextrose, , Continuous PRN  sodium chloride, , PRN  fentanNYL, 25 mcg, Q3H PRN  busPIRone, 10 mg, Q6H PRN  fluticasone, 1 spray, Daily PRN  sodium chloride, 1 spray, PRN  sodium chloride flush, 5-40 mL, PRN  sodium chloride, , PRN  ondansetron, 4 mg, Q8H PRN   Or  ondansetron, 4 mg, Q6H PRN  polyethylene glycol, 17 g, Daily PRN  acetaminophen, 650 mg, Q6H PRN   Or  acetaminophen, 650 mg, Q6H PRN        Labs  \"BILITOT\", \"ALKPHOS\" in the last 72 hours.    Invalid input(s): \"ALB\"    Lipids:   Lab Results   Component Value Date/Time    CHOL 187 11/22/2024 06:02 AM    HDL 76 11/22/2024 06:02 AM    TRIG 110 11/22/2024 06:02 AM     Hemoglobin A1C:   Lab Results   Component Value Date/Time    LABA1C 5.3 02/02/2024 07:32 PM     TSH:   Lab Results   Component Value Date/Time    TSH 0.20 12/12/2024 05:49 AM     Troponin: No results found for: \"TROPONINT\"  Lactic Acid:   No results for input(s): \"LACTA\" in the last 72 hours.    BNP:   No results for input(s): \"PROBNP\" in the last 72 hours.    UA:  Lab Results   Component Value Date/Time    NITRU Negative 12/01/2024 10:46 AM    COLORU Yellow 12/01/2024 10:46 AM    PHUR 6.0 12/01/2024 10:46 AM    PHUR 7.0 12/15/2023 06:26 PM    WBCUA 0-2 10/11/2024 11:45 PM    RBCUA 0-2 10/11/2024 11:45 PM    MUCUS Rare 08/30/2023 01:50 PM    YEAST Present 10/12/2021 04:05 AM    BACTERIA 2+ 08/30/2023 01:50 PM    CLARITYU Clear 12/01/2024 10:46 AM    SPECGRAV <1.005 05/07/2013 01:35 PM    LEUKOCYTESUR Negative 12/01/2024 10:46 AM    UROBILINOGEN 0.2 12/01/2024 10:46 AM    BILIRUBINUR Negative 12/01/2024 10:46 AM    BLOODU Negative 12/01/2024 10:46 AM    GLUCOSEU Negative 12/01/2024 10:46 AM    KETUA Negative 12/01/2024 10:46 AM    AMORPHOUS 2+ 07/05/2023 06:11 PM     Urine Cultures:   Lab Results   Component Value Date/Time    LABURIN  08/30/2023 02:09 PM     <10,000 CFU/ml mixed skin/urogenital milad. No further workup     Blood Cultures:   Lab Results   Component Value Date/Time    BC No Growth after 4 days of incubation. 12/11/2024 11:21 AM     Lab Results   Component Value Date/Time    BLOODCULT2 No Growth after 4 days of incubation. 12/11/2024 11:21 AM     Organism:   Lab Results   Component Value Date/Time    ORG Staph aureus MRSA 12/11/2024 06:43 PM         Electronically signed by Christiano Lorenz MD on 12/18/2024 at 7:42 AM

## 2024-12-18 NOTE — CARE COORDINATION
CM update LOS 7- Cm received call back from Emelia @ [facility] checking on bed hold days from previous SNF. CM called [facility] twice to verify, left vm. Pt on diet advanced to soft foods., getting Vanco IV.  GI following. Pt resumed Eliquis. Family wants pt to go to [facility] and pt is agreeing.

## 2024-12-18 NOTE — RT PROTOCOL NOTE
RT Inhaler-Nebulizer Bronchodilator Protocol Note    There is a bronchodilator order in the chart from a provider indicating to follow the RT Bronchodilator Protocol and there is an “Initiate RT Inhaler-Nebulizer Bronchodilator Protocol” order as well (see protocol at bottom of note).    CXR Findings:  No results found.    The findings from the last RT Protocol Assessment were as follows:   History Pulmonary Disease: Chronic pulmonary disease  Respiratory Pattern: Dyspnea on exertion or RR 21-25 bpm  Breath Sounds: Severe inspiratory and expiratory wheezing or severely diminished  Cough: Strong, spontaneous, non-productive  Indication for Bronchodilator Therapy: On home bronchodilators, Decreased or absent breath sounds, Wheezing associated with pulm disorder  Bronchodilator Assessment Score: 12    Aerosolized bronchodilator medication orders have been revised according to the RT Inhaler-Nebulizer Bronchodilator Protocol below.    Respiratory Therapist to perform RT Therapy Protocol Assessment initially then follow the protocol.  Repeat RT Therapy Protocol Assessment PRN for score 0-3 or on second treatment, BID, and PRN for scores above 3.    No Indications - adjust the frequency to every 6 hours PRN wheezing or bronchospasm, if no treatments needed after 48 hours then discontinue using Per Protocol order mode.     If indication present, adjust the RT bronchodilator orders based on the Bronchodilator Assessment Score as indicated below.  Use Inhaler orders unless patient has one or more of the following: on home nebulizer, not able to hold breath for 10 seconds, is not alert and oriented, cannot activate and use MDI correctly, or respiratory rate 25 breaths per minute or more, then use the equivalent nebulizer order(s) with same Frequency and PRN reasons based on the score.  If a patient is on this medication at home then do not decrease Frequency below that used at home.    0-3 - enter or revise RT bronchodilator

## 2024-12-18 NOTE — PROGRESS NOTES
12/17/24 2336   NIV Type   NIV Started/Stopped On   Equipment Type v60   Mode Bilevel   Mask Type Full face mask   Mask Size Small   Assessment   Respirations 22   SpO2 100 %   Comfort Level Good   Using Accessory Muscles No   Mask Compliance Good   Skin Assessment Clean, dry, & intact   Skin Protection for O2 Device Yes   Orientation Middle   Location Nose   Intervention(s) Skin Barrier   Settings/Measurements   PIP Observed 12 cm H20   IPAP 12 cmH20   CPAP/EPAP 6 cmH2O   Vt (Measured) 325 mL   Rate Ordered 8   FiO2  35 %  (decreased to 30%)   I Time/ I Time % 1 s   Minute Volume (L/min) 6.7 Liters   Mask Leak (lpm) 2 lpm   Patient's Home Machine No   Alarm Settings   Alarms On Y   Low Pressure (cmH2O) 6 cmH2O   High Pressure (cmH2O) 30 cmH2O   Apnea (secs) 20 secs   RR Low (bpm) 6   RR High (bpm) 45 br/min

## 2024-12-18 NOTE — PROGRESS NOTES
PROGRESS NOTE    HPI: Terri Smith is a(n)84 y.o. female admitted for work-up and treatment for Shortness of breath [R06.02]  Septicemia (HCC) [A41.9]  Pneumonia, unspecified organism [J18.9]  Pneumonia due to infectious organism, unspecified laterality, unspecified part of lung [J18.9].     We are following for melena, dysphagia.    Subjective:     She is feeling well. Still NPO. Non bloody BMs.      Objective:     I/O last 3 completed shifts:  In: -   Out: 500 [Urine:500]      /69   Pulse 88   Temp 97.7 °F (36.5 °C) (Oral)   Resp 20   Ht 1.626 m (5' 4.02\")   Wt 43.2 kg (95 lb 3.8 oz)   SpO2 99%   BMI 16.34 kg/m²     Physical Exam:  HEENT: anicteric sclera, oropharyngeal membranes pink and moist.  Cor: RRR  Lungs: non-labored at rest, 3 L O2  Abdomen: soft, NT. No ascites.  No hepatomegaly or splenomegaly  Extremities: no edema  Neuro: alert and oriented x 3      Results:   Lab Results   Component Value Date    ALT <5 (L) 12/15/2024    AST 19 12/15/2024    ALKPHOS 67 12/15/2024    BILIDIR <0.1 11/22/2024    INR 1.21 (H) 12/01/2024    LIPASE 26.0 12/11/2024     Lab Results   Component Value Date    WBC 8.3 12/18/2024    HGB 9.8 (L) 12/18/2024    HCT 29.4 (L) 12/18/2024    MCV 93.4 12/18/2024     12/18/2024     BUN/Cr/glu/ALT/AST/amyl/lip:  13/1.3/--/--/--/--/-- (12/18 0506)  XR ABDOMEN FOR NG/OG/NE TUBE PLACEMENT    Result Date: 12/15/2024  NG tube is in the stomach.     XR ABDOMEN FOR NG/OG/NE TUBE PLACEMENT    Result Date: 12/15/2024  1. Orogastric tube in place, with distal tip overlying the distal esophagus. It should be advanced another 15 cm. 2. No acute cardiopulmonary process.     XR CHEST PORTABLE    Result Date: 12/14/2024  Minimal new opacities of the medial left lung base could represent pneumonia/aspiration in the appropriate clinical setting. Chronic bilateral interstitial opacities.     XR CHEST PORTABLE    Result Date:  12/12/2024  Slight interval progression of diffuse reticular opacities throughout both lungs, which could represent mild interstitial pulmonary edema or acute interstitial pneumonia.     XR CHEST PORTABLE    Result Date: 12/11/2024  Increased linear opacities in both lungs could represent pulmonary edema or atypical infection         Impression:  84-year-old female with past medical history of HTN, COPD, chronic O2 dependence, vertigo, CAD s/p CABG 2018, CHF, CKD, DM2, A-fib on Eliquis, dysphagia, Schatzki's ring s/p dilation and 9/2024 anxiety, depression and GERD who presented to the ER with shortness of breath.  She was discharged on 12/6 for similar presentation.  She was admitted with hypoxic respiratory failure, pneumonia and A-fib with RVR.     Melena, heme positive stool.  S/p EGD and colonoscopy yesterday - With findings of a visible vessel at the GEJ, nonbleeding.  Endo Clip was applied.  Small colon polyp removed on colonoscopy, path-tubular adenoma.  -Overt bleeding is clinically resolved.  Hgb stable.    2.  Dysphagia s/p dilation with 60 Fr Da Silva dilator yesterday.  MBS today - cleared for soft diet.     3.  Sinus tachycardia.  Seen by EP.  Signed off.     4.  Hypoxic respiratory failure.  Pneumonia.    Plan:  Continue PPI twice daily.  Can have diet from GI standpoint once cleared by speech pathology.  If evidence of rebleeding will plan for a capsule endoscopy.  Eliquis restarted yesterday.    Please do not hesitate to call with questions or concerns.      Electronically signed by: CLIFTON Melchor 12/18/2024 1:31 PM     (Office) [phone]  (Fax) [phone]  Available via perfect serve

## 2024-12-18 NOTE — PROGRESS NOTES
CHF Care Plan      Patient's EF (Ejection Fraction) is greater than 40%    Heart Failure Medications:  Diuretics:: Furosemide    (One of the following REQUIRED for EF </= 40%/SYSTOLIC FAILURE but MAY be used in EF% >40%/DIASTOLIC FAILURE)        ACE:: None        ARB:: None         ARNI:: None    (Beta Blockers)  NON- Evidenced Based Beta Blocker (for EF% >40%/DIASTOLIC FAILURE): Metoprolol TARTrate- Lopressor    Evidenced Based Beta Blocker::(REQUIRED for EF% <40%/SYSTOLIC FAILURE) None  ...................................................................................................................................................    Failed to redirect to the Timeline version of the [employer] SmartLink.      Patient's weights and intake/output reviewed    Daily Weight log at bedside, patient/family participation in use of log: no    Patient's current weight today shows a difference of 3 lbs less than last documented weight.      Intake/Output Summary (Last 24 hours) at 12/18/2024 0745  Last data filed at 12/18/2024 0629  Gross per 24 hour   Intake --   Output 500 ml   Net -500 ml       Education Booklet Provided: yes    Comorbidities Reviewed Yes    Patient has a past medical history of NADJA (acute kidney injury) ([facility]), Asthma, Atrial fibrillation with RVR ([facility]), CAD (coronary artery disease), CHF (congestive heart failure) ([facility]), Chronic anxiety, COPD (chronic obstructive pulmonary disease) ([facility]), COPD (chronic obstructive pulmonary disease) (HCC), GERD (gastroesophageal reflux disease), Heart attack (HCC), History of blood transfusion, Hyperlipidemia, Hypertension, MRSA (methicillin resistant staph aureus) culture positive, OA (osteoarthritis), and Thyroid disease.     >>For CHF and Comorbidity documentation on Education Time and Topics, please see Education Tab      CHF Education    Learners:  Patient  Readineess:   Acceptance  Method:   Explanation  Response:    Verbalizes Understanding    Comments: none    Time  Spent: 10 minutes      Pt resting in bed at this time on  2 L O2. Pt denies shortness of breath. Pt without lower extremity edema.     Patient and/or Family's stated Goal of Care this Admission: reduce shortness of breath, increase activity tolerance, better understand heart failure and disease management, be more comfortable, and reduce lower extremity edema prior to discharge        :

## 2024-12-18 NOTE — PLAN OF CARE
Problem: Discharge Planning  Goal: Discharge to home or other facility with appropriate resources  12/17/2024 2354 by Duke Lainez RN  Outcome: Progressing  12/17/2024 1637 by Mary Plasencia RN  Outcome: Progressing     Problem: Chronic Conditions and Co-morbidities  Goal: Patient's chronic conditions and co-morbidity symptoms are monitored and maintained or improved  12/17/2024 2354 by Duke Lainez RN  Outcome: Progressing  12/17/2024 1637 by Mary Plasencia RN  Outcome: Progressing     Problem: ABCDS Injury Assessment  Goal: Absence of physical injury  12/17/2024 2354 by Duke Lainez RN  Outcome: Progressing  12/17/2024 1637 by Mary Plasencia RN  Outcome: Progressing     Problem: Confusion  Goal: Confusion, delirium, dementia, or psychosis is improved or at baseline  Description: INTERVENTIONS:  1. Assess for possible contributors to thought disturbance, including medications, impaired vision or hearing, underlying metabolic abnormalities, dehydration, psychiatric diagnoses, and notify attending LIP  2. [place] high risk fall precautions, as indicated  3. Provide frequent short contacts to provide reality reorientation, refocusing and direction  4. Decrease environmental stimuli, including noise as appropriate  5. Monitor and intervene to maintain adequate nutrition, hydration, elimination, sleep and activity  6. If unable to ensure safety without constant attention obtain sitter and review sitter guidelines with assigned personnel  7. Initiate Psychosocial CNS and Spiritual Care consult, as indicated  12/17/2024 2354 by Duke Lainez RN  Outcome: Progressing  12/17/2024 1637 by Mary Plasencia RN  Outcome: Progressing

## 2024-12-18 NOTE — PROCEDURES
Speech Language Pathology  Modified Barium Swallow Study  Facility/Department: [place] [address] [facility]        Recommendations:  Diet recommendation: IDDSI 6 Soft and Bite Sized Solids (per pt preference d/t lack of dentition; Pt can tolerate regular solid); IDDSI 0 Thin Liquids-SMALL sips- Straws OK; Meds PO as tolerated  Risk management: upright for all intake, small bites/sips, distant supervision with intake, oral care 2-3x/day to reduce adverse affects in the event of aspiration, increase physical mobility as able, alternate bites/sips, slow rate of intake, STRICT aspiration precautions, and hold PO and contact SLP if s/s of aspiration or worsening respiratory status develop.      NAME:Terri Smith  : 1940 (84 y.o.)   MRN: [id number]  ROOM: [address] [place], [place] [address]-  ADMISSION DATE: 2024  PATIENT DIAGNOSIS(ES): Shortness of breath [R06.02]  Septicemia (HCC) [A41.9]  Pneumonia, unspecified organism [J18.9]  Pneumonia due to infectious organism, unspecified laterality, unspecified part of lung [J18.9]  Chief Complaint   Patient presents with    Shortness of Breath     Patient from [facility], called for SOB, staff reported O2 sat in the 60's, however EMS reports 100% on RA. Per pt's son pt more lethargic and less conversive than usual but is at baseline mental status. Pt wears 2-3L NC at baseline for CHF and COPD     Patient Active Problem List    Diagnosis Date Noted    Pneumonia, unspecified organism 2024    Vertigo 2024    Acute on chronic heart failure with normal ejection fraction (HCC) 2024    Chest pain due to CAD (HCC) 2024    Anticoagulated 2024    History of GI bleed 2024    Acute on chronic anemia 2024    Other hemorrhoids 2024    SOB (shortness of breath) 2024    Pulmonary venous congestion 2024    Pulmonary HTN (HCC) 2024    Centrilobular emphysema (HCC) 2024    COVID-19 virus infection 2024    Dizziness 2023    Hearing loss  ejected from the airway    3 Material enters the airway, remains above the vocal folds, and is not ejected from the airway    4 Material enters the airway, contacts the vocal folds, an is ejected from the airway    5 Material enters the airway, contacts the vocal folds, and is not ejected from the airway    6 Material enters the airway, passes below the vocal folds and is ejected into the larynx or out of the airway    7 Material enters the airway, passes below the vocal folds, and is not ejected from the trachea despite effort    8 Material enters the airway, passes below the vocal folds, and no effort is made to eject.           Compensatory Swallowing Strategies Attempted: x1 double swallow via nectar thick straw - Successful   Postural Changes and/or Swallow Maneuvers Trialed: N/A   Patient Position: Lateral and Patient Degrees: 90 degrees, Seated upright in [facility] chair  Consistencies Administered: Thin tsp, Thin cup sip, Thin straw , Mildly (nectar) thick straw , Puree, Soft and Bite Sized , Regular        Recommendations:  Diet recommendation: IDDSI 6 Soft and Bite Sized Solids (per pt preference d/t lack of dentition; Pt can tolerate regular solid); IDDSI 0 Thin Liquids-SMALL sips- Straws OK; Meds PO as tolerated  Risk management: upright for all intake, small bites/sips, distant supervision with intake, oral care 2-3x/day to reduce adverse affects in the event of aspiration, increase physical mobility as able, alternate bites/sips, slow rate of intake, STRICT aspiration precautions, and hold PO and contact SLP if s/s of aspiration or worsening respiratory status develop.    Safe Swallow Protocol:  Supervision: distant   Compensatory Swallowing Strategies: HOB 90* and 30\" after meals; small bites/sips; alternate solids/liquids every 3-5 bites; oral care after every meal      Behavior/Cognition/Vision/Hearing:  Behavior/Cognition: alert and cooperative   Vision: WFL   Hearing: WFL

## 2024-12-18 NOTE — PROGRESS NOTES
12/18/24 0036   NIV Type   $NIV $Daily Charge   Equipment Type v60   Mode Bilevel   Mask Type Full face mask   Mask Size Small   Assessment   Comfort Level Good   Using Accessory Muscles No   Mask Compliance Good   Skin Protection for O2 Device Yes   Orientation Middle   Location Nose   Intervention(s) Skin Barrier   Settings/Measurements   PIP Observed 12 cm H20   IPAP 12 cmH20   CPAP/EPAP 6 cmH2O   Vt (Measured) 315 mL   Rate Ordered 8   FiO2  30 %   I Time/ I Time % 1 s   Minute Volume (L/min) 6.3 Liters   Mask Leak (lpm) 11 lpm   Patient's Home Machine No   Alarm Settings   Alarms On Y   Low Pressure (cmH2O) 6 cmH2O   High Pressure (cmH2O) 30 cmH2O   Apnea (secs) 20 secs   RR Low (bpm) 6   RR High (bpm) 40 br/min        12/18/24 0036   NIV Type   $NIV $Daily Charge   Equipment Type v60   Mode Bilevel   Mask Type Full face mask   Mask Size Small   Assessment   Comfort Level Good   Using Accessory Muscles No   Mask Compliance Good   Skin Protection for O2 Device Yes   Orientation Middle   Location Nose   Intervention(s) Skin Barrier   Settings/Measurements   PIP Observed 12 cm H20   IPAP 12 cmH20   CPAP/EPAP 6 cmH2O   Vt (Measured) 315 mL   Rate Ordered 8   FiO2  30 %   I Time/ I Time % 1 s   Minute Volume (L/min) 6.3 Liters   Mask Leak (lpm) 11 lpm   Patient's Home Machine No   Alarm Settings   Alarms On Y   Low Pressure (cmH2O) 6 cmH2O   High Pressure (cmH2O) 30 cmH2O   Apnea (secs) 20 secs   RR Low (bpm) 6   RR High (bpm) 40 br/min

## 2024-12-18 NOTE — PROGRESS NOTES
Occupational Therapy  Facility/Department: [facility] C4 PCU  Daily Treatment Note  NAME: Terri Smith  : 1940  MRN: [id number]    Date of Service: 2024    Discharge Recommendations:  Subacute/Skilled Nursing Facility     Therapy discharge recommendations take into account each patient's current medical complexities and are subject to input/oversight from the patient's healthcare team.   Barriers to Home Discharge:   [] Steps to access home entry or bed/bath:   [x] Unable to transfer, ambulate, or propel wheelchair household distances without assist   [x] Limited available assist at home upon discharge    [x] Patient or family requests d/c to post-acute facility    [x] Poor cognition/safety awareness for d/c to home alone   [] Unable to maintain ordered weight bearing status    [] Patient with salient signs of long-standing immobility   [x] Other: Decreased IND with ADLs      Patient Diagnosis(es): The primary encounter diagnosis was Pneumonia due to infectious organism, unspecified laterality, unspecified part of lung. Diagnoses of Shortness of breath, Septicemia (HCC), Dysphagia, unspecified type, and Melena were also pertinent to this visit.     Assessment   Assessment: Patient making slow progress with OT goals. Patient able to tolerate OOB activity for the first time today. Patient requiring Mod A with simple sit to stand and stand pivot transfers this date. Patient requiring extra time and prolonged rest breaks between each standing trial. Patient tolerates up to 10 seconds of static standing before requiring return to supported sitting. SpO2 remains >90% on 2L O2 during OT session with HR ranging from 90bpm to 112bpm. Patient continues to require total A with LB ADLs. At baseline patient ambulates household distances without device and completes ADLs with SBA. Patient remains far from baseline level of occupational performance. Recommend continued OT services to increase safety and independence  to/from chair x3 reps, Mod A stand pivot chair to EOB. Min A EOB to supine using bedrail        Safety Devices  Type of Devices: All fall risk precautions in place;Call light within reach;Patient at risk for falls;Left in bed;Nurse notified;All danna prominences offloaded;Gait belt;Bed alarm in place    Patient Education  Education Given To: Patient  Education Provided: Role of Therapy;Plan of Care;Equipment;Precautions;Orientation;Mobility Training;ADL Adaptive Strategies;Fall Prevention Strategies;Transfer Training  Education Provided Comments: Pt educated on importance of OOB mobility, prevention of complications of bedrest, and general safety during hospitalization, standing balance, body positioning for transfers, bed mobility, positioning to promote safe swallowing during feeding  Education Method: Demonstration;Verbal  Barriers to Learning: Cognition;Vision  Education Outcome: Verbalized understanding;Demonstrated understanding;Continued education needed    Goals  Short Term Goals  Time Frame for Short Term Goals: 2 weeks (12/26/24) - goals ongoing 12/18  Short Term Goal 1: Tolerate sitting EOB supervision x10 minutes in preparation for engagement in ADLs.  Short Term Goal 2: Mod A toileting  Short Term Goal 3: Mod A functional transfers to/from EOB, chair, toilet/BSC  Short Term Goal 4: SBA grooming seated EOB  Short Term Goal 5: Mod A total body dressing  Patient Goals   Patient goals : to be able to get out of bed on her own    AM-PAC - ADL  AM-PAC Daily Activity - Inpatient   How much help is needed for putting on and taking off regular lower body clothing?: Total  How much help is needed for bathing (which includes washing, rinsing, drying)?: A Lot  How much help is needed for toileting (which includes using toilet, bedpan, or urinal)?: Total  How much help is needed for putting on and taking off regular upper body clothing?: A Lot  How much help is needed for taking care of personal grooming?: A

## 2024-12-18 NOTE — CARE COORDINATION
ADDISON received a call from the pt's son Raphael. They would like their mother to go to [facility]. ADDISON called Emelia at [facility] and she wanted the info faxed to [phone].  Done

## 2024-12-19 LAB
ANION GAP SERPL CALCULATED.3IONS-SCNC: 9 MMOL/L (ref 3–16)
BASOPHILS # BLD: 0 K/UL (ref 0–0.2)
BASOPHILS NFR BLD: 0.4 %
BUN SERPL-MCNC: 15 MG/DL (ref 7–20)
CALCIUM SERPL-MCNC: 8.6 MG/DL (ref 8.3–10.6)
CHLORIDE SERPL-SCNC: 106 MMOL/L (ref 99–110)
CO2 SERPL-SCNC: 28 MMOL/L (ref 21–32)
CREAT SERPL-MCNC: 1.6 MG/DL (ref 0.6–1.2)
DEPRECATED RDW RBC AUTO: 15.4 % (ref 12.4–15.4)
EOSINOPHIL # BLD: 0.3 K/UL (ref 0–0.6)
EOSINOPHIL NFR BLD: 3.4 %
GFR SERPLBLD CREATININE-BSD FMLA CKD-EPI: 32 ML/MIN/{1.73_M2}
GLUCOSE BLD-MCNC: 104 MG/DL (ref 70–99)
GLUCOSE SERPL-MCNC: 86 MG/DL (ref 70–99)
HCT VFR BLD AUTO: 27.3 % (ref 36–48)
HGB BLD-MCNC: 8.7 G/DL (ref 12–16)
LYMPHOCYTES # BLD: 1.6 K/UL (ref 1–5.1)
LYMPHOCYTES NFR BLD: 20.4 %
MCH RBC QN AUTO: 30.7 PG (ref 26–34)
MCHC RBC AUTO-ENTMCNC: 31.9 G/DL (ref 31–36)
MCV RBC AUTO: 96.1 FL (ref 80–100)
MONOCYTES # BLD: 0.6 K/UL (ref 0–1.3)
MONOCYTES NFR BLD: 7.4 %
NEUTROPHILS # BLD: 5.4 K/UL (ref 1.7–7.7)
NEUTROPHILS NFR BLD: 68.4 %
PERFORMED ON: ABNORMAL
PLATELET # BLD AUTO: 307 K/UL (ref 135–450)
PMV BLD AUTO: 7.2 FL (ref 5–10.5)
POTASSIUM SERPL-SCNC: 4.5 MMOL/L (ref 3.5–5.1)
RBC # BLD AUTO: 2.84 M/UL (ref 4–5.2)
SODIUM SERPL-SCNC: 143 MMOL/L (ref 136–145)
WBC # BLD AUTO: 7.9 K/UL (ref 4–11)

## 2024-12-19 PROCEDURE — 6360000002 HC RX W HCPCS: Performed by: STUDENT IN AN ORGANIZED HEALTH CARE EDUCATION/TRAINING PROGRAM

## 2024-12-19 PROCEDURE — 2060000000 HC ICU INTERMEDIATE R&B

## 2024-12-19 PROCEDURE — 6370000000 HC RX 637 (ALT 250 FOR IP): Performed by: INTERNAL MEDICINE

## 2024-12-19 PROCEDURE — 94761 N-INVAS EAR/PLS OXIMETRY MLT: CPT

## 2024-12-19 PROCEDURE — 2580000003 HC RX 258: Performed by: STUDENT IN AN ORGANIZED HEALTH CARE EDUCATION/TRAINING PROGRAM

## 2024-12-19 PROCEDURE — 6360000002 HC RX W HCPCS: Performed by: INTERNAL MEDICINE

## 2024-12-19 PROCEDURE — 94640 AIRWAY INHALATION TREATMENT: CPT

## 2024-12-19 PROCEDURE — 2700000000 HC OXYGEN THERAPY PER DAY

## 2024-12-19 PROCEDURE — 80048 BASIC METABOLIC PNL TOTAL CA: CPT

## 2024-12-19 PROCEDURE — 2500000003 HC RX 250 WO HCPCS: Performed by: INTERNAL MEDICINE

## 2024-12-19 PROCEDURE — 85025 COMPLETE CBC W/AUTO DIFF WBC: CPT

## 2024-12-19 PROCEDURE — 6370000000 HC RX 637 (ALT 250 FOR IP): Performed by: STUDENT IN AN ORGANIZED HEALTH CARE EDUCATION/TRAINING PROGRAM

## 2024-12-19 PROCEDURE — 6360000002 HC RX W HCPCS

## 2024-12-19 PROCEDURE — 6370000000 HC RX 637 (ALT 250 FOR IP): Performed by: NURSE PRACTITIONER

## 2024-12-19 PROCEDURE — 36415 COLL VENOUS BLD VENIPUNCTURE: CPT

## 2024-12-19 PROCEDURE — 94669 MECHANICAL CHEST WALL OSCILL: CPT

## 2024-12-19 RX ORDER — TRAMADOL HYDROCHLORIDE 50 MG/1
50 TABLET ORAL EVERY 12 HOURS PRN
Status: DISCONTINUED | OUTPATIENT
Start: 2024-12-19 | End: 2024-12-21 | Stop reason: HOSPADM

## 2024-12-19 RX ORDER — SODIUM CHLORIDE 9 MG/ML
INJECTION, SOLUTION INTRAVENOUS CONTINUOUS
Status: ACTIVE | OUTPATIENT
Start: 2024-12-19 | End: 2024-12-20

## 2024-12-19 RX ADMIN — GUAIFENESIN 600 MG: 600 TABLET ORAL at 10:09

## 2024-12-19 RX ADMIN — METOPROLOL TARTRATE 12.5 MG: 25 TABLET, FILM COATED ORAL at 10:09

## 2024-12-19 RX ADMIN — ESCITALOPRAM OXALATE 10 MG: 10 TABLET ORAL at 10:09

## 2024-12-19 RX ADMIN — ALBUTEROL SULFATE 2.5 MG: 2.5 SOLUTION RESPIRATORY (INHALATION) at 11:59

## 2024-12-19 RX ADMIN — ONDANSETRON 4 MG: 2 INJECTION INTRAMUSCULAR; INTRAVENOUS at 01:17

## 2024-12-19 RX ADMIN — VANCOMYCIN HYDROCHLORIDE 500 MG: 500 INJECTION, POWDER, LYOPHILIZED, FOR SOLUTION INTRAVENOUS at 10:32

## 2024-12-19 RX ADMIN — SODIUM CHLORIDE: 9 INJECTION, SOLUTION INTRAVENOUS at 16:16

## 2024-12-19 RX ADMIN — BUDESONIDE 500 MCG: 0.5 SUSPENSION RESPIRATORY (INHALATION) at 08:11

## 2024-12-19 RX ADMIN — ARFORMOTEROL TARTRATE 15 MCG: 15 SOLUTION RESPIRATORY (INHALATION) at 20:28

## 2024-12-19 RX ADMIN — ONDANSETRON 4 MG: 2 INJECTION INTRAMUSCULAR; INTRAVENOUS at 21:03

## 2024-12-19 RX ADMIN — TRAMADOL HYDROCHLORIDE 50 MG: 50 TABLET, COATED ORAL at 21:06

## 2024-12-19 RX ADMIN — SODIUM CHLORIDE, PRESERVATIVE FREE 10 ML: 5 INJECTION INTRAVENOUS at 09:00

## 2024-12-19 RX ADMIN — ATORVASTATIN CALCIUM 40 MG: 40 TABLET, FILM COATED ORAL at 21:06

## 2024-12-19 RX ADMIN — APIXABAN 2.5 MG: 5 TABLET, FILM COATED ORAL at 21:06

## 2024-12-19 RX ADMIN — ROFLUMILAST 500 MCG: 500 TABLET ORAL at 14:25

## 2024-12-19 RX ADMIN — DOCUSATE SODIUM 100 MG: 100 CAPSULE, LIQUID FILLED ORAL at 10:09

## 2024-12-19 RX ADMIN — FUROSEMIDE 20 MG: 10 INJECTION, SOLUTION INTRAMUSCULAR; INTRAVENOUS at 10:15

## 2024-12-19 RX ADMIN — ALBUTEROL SULFATE 2.5 MG: 2.5 SOLUTION RESPIRATORY (INHALATION) at 16:05

## 2024-12-19 RX ADMIN — ALBUTEROL SULFATE 2.5 MG: 2.5 SOLUTION RESPIRATORY (INHALATION) at 20:28

## 2024-12-19 RX ADMIN — BUDESONIDE 500 MCG: 0.5 SUSPENSION RESPIRATORY (INHALATION) at 20:28

## 2024-12-19 RX ADMIN — APIXABAN 2.5 MG: 5 TABLET, FILM COATED ORAL at 10:14

## 2024-12-19 RX ADMIN — ALBUTEROL SULFATE 2.5 MG: 2.5 SOLUTION RESPIRATORY (INHALATION) at 08:12

## 2024-12-19 RX ADMIN — GABAPENTIN 100 MG: 100 CAPSULE ORAL at 21:06

## 2024-12-19 RX ADMIN — GABAPENTIN 100 MG: 100 CAPSULE ORAL at 14:25

## 2024-12-19 RX ADMIN — GABAPENTIN 100 MG: 100 CAPSULE ORAL at 10:09

## 2024-12-19 RX ADMIN — SODIUM CHLORIDE: 9 INJECTION, SOLUTION INTRAVENOUS at 23:24

## 2024-12-19 RX ADMIN — LEVOTHYROXINE SODIUM 25 MCG: 0.03 TABLET ORAL at 07:36

## 2024-12-19 RX ADMIN — METOPROLOL TARTRATE 12.5 MG: 25 TABLET, FILM COATED ORAL at 21:06

## 2024-12-19 RX ADMIN — PANTOPRAZOLE SODIUM 40 MG: 40 TABLET, DELAYED RELEASE ORAL at 07:36

## 2024-12-19 ASSESSMENT — PAIN SCALES - GENERAL
PAINLEVEL_OUTOF10: 0
PAINLEVEL_OUTOF10: 4
PAINLEVEL_OUTOF10: 4
PAINLEVEL_OUTOF10: 0

## 2024-12-19 ASSESSMENT — PAIN DESCRIPTION - LOCATION
LOCATION: BACK
LOCATION: BACK

## 2024-12-19 ASSESSMENT — PAIN DESCRIPTION - ORIENTATION: ORIENTATION: MID

## 2024-12-19 ASSESSMENT — PAIN DESCRIPTION - DESCRIPTORS: DESCRIPTORS: ACHING

## 2024-12-19 NOTE — PLAN OF CARE
CHF Care Plan      Patient's EF (Ejection Fraction) is greater than 40%    Heart Failure Medications:  Diuretics:: Furosemide    (One of the following REQUIRED for EF </= 40%/SYSTOLIC FAILURE but MAY be used in EF% >40%/DIASTOLIC FAILURE)        ACE:: None        ARB:: None         ARNI:: None    (Beta Blockers)  NON- Evidenced Based Beta Blocker (for EF% >40%/DIASTOLIC FAILURE): Metoprolol TARTrate- Lopressor    Evidenced Based Beta Blocker::(REQUIRED for EF% <40%/SYSTOLIC FAILURE) None  ...................................................................................................................................................    Failed to redirect to the Timeline version of the [employer] SmartLink.      Patient's weights and intake/output reviewed    Daily Weight log at bedside, patient/family participation in use of log: no    Patient's current weight today shows a difference of 9 lbs more than last documented weight.      Intake/Output Summary (Last 24 hours) at 12/19/2024 0740  Last data filed at 12/19/2024 0309  Gross per 24 hour   Intake 680 ml   Output --   Net 680 ml       Education Booklet Provided: yes    Comorbidities Reviewed Yes    Patient has a past medical history of NADJA (acute kidney injury) ([facility]), Asthma, Atrial fibrillation with RVR ([facility]), CAD (coronary artery disease), CHF (congestive heart failure) ([facility]), Chronic anxiety, COPD (chronic obstructive pulmonary disease) ([facility]), COPD (chronic obstructive pulmonary disease) (HCC), GERD (gastroesophageal reflux disease), Heart attack ([facility]), History of blood transfusion, Hyperlipidemia, Hypertension, MRSA (methicillin resistant staph aureus) culture positive, OA (osteoarthritis), and Thyroid disease.     >>For CHF and Comorbidity documentation on Education Time and Topics, please see Education Tab      CHF Education    Learners:  Patient  Readineess:   Acceptance  Method:   Explanation  Response:    Verbalizes Understanding    Comments: Educated on  importance of accurate intake and output.  Output unable to be obtained as patient has excoriation in her olivier area from a purewick     Time Spent: 10 minutes      Pt resting in bed at this time on  2 L O2. Pt denies shortness of breath. Pt with pitting lower extremity edema.     Patient and/or Family's stated Goal of Care this Admission: reduce shortness of breath, increase activity tolerance, better understand heart failure and disease management, be more comfortable, and reduce lower extremity edema prior to discharge        :

## 2024-12-19 NOTE — PROGRESS NOTES
PROGRESS NOTE    HPI: Terri Smith is a(n)84 y.o. female admitted for work-up and treatment for Shortness of breath [R06.02]  Septicemia (HCC) [A41.9]  Pneumonia, unspecified organism [J18.9]  Pneumonia due to infectious organism, unspecified laterality, unspecified part of lung [J18.9]  Acute hypoxic respiratory failure [J96.01].     We are following for melena, dysphagia.    Subjective:     No acute events overnight.  She denies dysphagia with food.  No overt bleeding reported.      Objective:     I/O last 3 completed shifts:  In: 680 [P.O.:680]  Out: 500 [Urine:500]      BP (!) 112/54   Pulse 86   Temp 97.7 °F (36.5 °C) (Oral)   Resp 20   Ht 1.626 m (5' 4.02\")   Wt 47.2 kg (104 lb 0.9 oz)   SpO2 99%   BMI 17.85 kg/m²     Physical Exam:  HEENT: anicteric sclera, oropharyngeal membranes pink and moist.  Cor: RRR  Lungs: non-labored at rest, 2 L O2  Abdomen: soft, NT. No ascites.  No hepatomegaly or splenomegaly  Extremities: no edema  Neuro: alert and oriented x 3      Results:   Lab Results   Component Value Date    ALT <5 (L) 12/15/2024    AST 19 12/15/2024    ALKPHOS 67 12/15/2024    BILIDIR <0.1 11/22/2024    INR 1.21 (H) 12/01/2024    LIPASE 26.0 12/11/2024     Lab Results   Component Value Date    WBC 7.9 12/19/2024    HGB 8.7 (L) 12/19/2024    HCT 27.3 (L) 12/19/2024    MCV 96.1 12/19/2024     12/19/2024     BUN/Cr/glu/ALT/AST/amyl/lip:  15/1.6/--/--/--/--/-- (12/19 0828)  XR ABDOMEN FOR NG/OG/NE TUBE PLACEMENT    Result Date: 12/15/2024  NG tube is in the stomach.     XR ABDOMEN FOR NG/OG/NE TUBE PLACEMENT    Result Date: 12/15/2024  1. Orogastric tube in place, with distal tip overlying the distal esophagus. It should be advanced another 15 cm. 2. No acute cardiopulmonary process.     XR CHEST PORTABLE    Result Date: 12/14/2024  Minimal new opacities of the medial left lung base could represent pneumonia/aspiration in the

## 2024-12-19 NOTE — PROGRESS NOTES
12/18/24 1950   NIV Type   NIV Started/Stopped On   Equipment Type v60   Mode Bilevel   Mask Type Full face mask   Mask Size Small   Assessment   Comfort Level Good   Using Accessory Muscles No   Mask Compliance Good   Skin Assessment Clean, dry, & intact   Skin Protection for O2 Device Yes   Orientation Middle   Location Nose   Intervention(s) Skin Barrier   Settings/Measurements   PIP Observed 11 cm H20   IPAP 12 cmH20   CPAP/EPAP 6 cmH2O   Vt (Measured) 355 mL   Rate Ordered 8   Insp Rise Time (%) 3 %   FiO2  30 %   I Time/ I Time % 1 s   Minute Volume (L/min) 8.1 Liters   Mask Leak (lpm) 13 lpm   Patient's Home Machine No   Alarm Settings   Alarms On Y   Low Pressure (cmH2O) 6 cmH2O   High Pressure (cmH2O) 30 cmH2O   Apnea (secs) 20 secs   RR Low (bpm) 6   RR High (bpm) 45 br/min     Pt requesting to be taken off at 2200.

## 2024-12-19 NOTE — PLAN OF CARE
Problem: Chronic Conditions and Co-morbidities  Goal: Patient's chronic conditions and co-morbidity symptoms are monitored and maintained or improved  12/19/2024 0759 by Sydni Delacruz RN  Outcome: Progressing  12/18/2024 1908 by Reanna Da Silva RN  Outcome: Progressing     Problem: Discharge Planning  Goal: Discharge to home or other facility with appropriate resources  12/19/2024 0759 by Sydni Delacruz RN  Outcome: Progressing  12/18/2024 1908 by Reanna Da Silva RN  Outcome: Progressing     Problem: Safety - Adult  Goal: Free from fall injury  12/19/2024 0759 by Sydni Delacruz RN  Outcome: Progressing  12/18/2024 1908 by Reanna Da Silva RN  Outcome: Progressing     Problem: Pain  Goal: Verbalizes/displays adequate comfort level or baseline comfort level  12/19/2024 0759 by Sydni Delacruz RN  Outcome: Progressing  12/18/2024 1908 by Reanna Da Silva RN  Outcome: Progressing     Problem: Skin/Tissue Integrity  Goal: Absence of new skin breakdown  Description: 1.  Monitor for areas of redness and/or skin breakdown  2.  Assess vascular access sites hourly  3.  Every 4-6 hours minimum:  Change oxygen saturation probe site  4.  Every 4-6 hours:  If on nasal continuous positive airway pressure, respiratory therapy assess nares and determine need for appliance change or resting period.  12/19/2024 0759 by Sydni Delacruz RN  Outcome: Progressing  12/18/2024 1908 by Reanna Da Silva RN  Outcome: Progressing     Problem: ABCDS Injury Assessment  Goal: Absence of physical injury  12/19/2024 0759 by Sydni Delacruz RN  Outcome: Progressing  12/18/2024 1908 by Reanna Da Silva RN  Outcome: Progressing     Problem: Confusion  Goal: Confusion, delirium, dementia, or psychosis is improved or at baseline  Description: INTERVENTIONS:  1. Assess for possible contributors to thought disturbance, including medications, impaired vision or hearing, underlying metabolic abnormalities, dehydration, psychiatric diagnoses, and notify attending LIP  2. [place]

## 2024-12-19 NOTE — PROGRESS NOTES
furosemide 20 mg once daily.   sinus tachycardia is multifactorial including pneumonia/SIRS combined with anemia, COPD exacerbation, and volume overload.    Hypoglycemia:  - Intermittent, improves to normal with D10 125mL bolus, likely d/t NPO status. Cont Pureed low volume pleasure feeds per Dietician & SLP above.   - Cont hypoglycemia management per protocol.     Acute Kidney Injury  Ongoing since 12/18. Cr 1.3> 1.6. Likely prerenal d/t Lasix and poor PO intake  Cont to monitor daily Cr.   Start IVNS 75cc/hr x 24h. If persisting, consider Nephro c/s.     Melena/anemia:  Had 1 episode of black tarry stool 12/15/24  Stool positive for occult blood  GI consulted > advised to hold eliquis initially, underwent EGD/C-scope  12/16/24, which showed no sign of Lubin's esophagus or active bleed, Endo Clip placed.  Evidence of diverticulosis with 4 mm ascending colonic polyp.  Recommending advance diet.  If signs of continued bleeding we will proceed with small bowel capsule endoscopy. Eliquis restarted 12/17 (ok per GI). Rec continue PPI IV twice daily, can have diet from GI standpoint and per SLP.   GI following peripherally.   Hemoglobin slightly improved 9.2 > 9.3 > 8.6 > 9.5 > 8.7 with no signs of overt bleeding.     Hypokalemia, resolved  Likely d/t poor PO intake.   Monitor and replete as needed.   She had mild NADJA on admission, improved with hydration      Heart failure with preserved ejection fraction.   Probable diastolic dysfunction.  This may be worsened due to tachycardia.  BNP elevated to 2778 but patient's labs and clinical status not consistent with fluid overload.    Will continue to monitor I&O's and electrolytes closely.     Diet recommendation:   Patient has repeated choking episodes  High risk for aspiration  Dietary and SLP consulted. MBSS completed 12/18, rec soft and bite sized solids with thin liquids and PO meds as tolerated.     Constipation:  Ongoing. Pt had soft BM today 12/19  Rec cont Colace,          Electronically signed by Christiano Lorenz MD on 12/19/2024 at 7:55 AM

## 2024-12-19 NOTE — PROGRESS NOTES
Shift Summary    Admission Diagnosis: Pneumonia, unspecified organism    Shift Events:  Patient wore BiPAP overnight for 2 hours then remained on 2L NC   Did not sleep well overnight   Excoriation noted in olivier area - zinc paste applied   No signs of aspiration with thins     Vitals:  Vitals:    12/18/24 1936 12/18/24 2010 12/19/24 0026 12/19/24 0309   BP:  98/62 (!) 81/51 (!) 88/58   Pulse:  95 91 87   Resp:  22 18 18   Temp:  98.1 °F (36.7 °C) 98.2 °F (36.8 °C) 97.8 °F (36.6 °C)   TempSrc:  Axillary Oral Oral   SpO2: 96% 100% 94% 92%   Weight:    47.2 kg (104 lb 0.9 oz)   Height:            WEIGHT: Admit Weight - Scale: 45.5 kg (100 lb 5 oz)      Today  Weight - Scale: 47.2 kg (104 lb 0.9 oz)    Tele:  normal sinus     IV/Line:  R Midline      Neuro:   oriented to time, place, person and situation - intermittent confusion     I/O:   No intake/output data recorded.

## 2024-12-19 NOTE — PLAN OF CARE
Problem: Chronic Conditions and Co-morbidities  Goal: Patient's chronic conditions and co-morbidity symptoms are monitored and maintained or improved  Outcome: Progressing     Problem: Discharge Planning  Goal: Discharge to home or other facility with appropriate resources  Outcome: Progressing     Problem: Safety - Adult  Goal: Free from fall injury  Outcome: Progressing     Problem: Pain  Goal: Verbalizes/displays adequate comfort level or baseline comfort level  Outcome: Progressing     Problem: Skin/Tissue Integrity  Goal: Absence of new skin breakdown  Description: 1.  Monitor for areas of redness and/or skin breakdown  2.  Assess vascular access sites hourly  3.  Every 4-6 hours minimum:  Change oxygen saturation probe site  4.  Every 4-6 hours:  If on nasal continuous positive airway pressure, respiratory therapy assess nares and determine need for appliance change or resting period.  Outcome: Progressing     Problem: ABCDS Injury Assessment  Goal: Absence of physical injury  Outcome: Progressing     Problem: Confusion  Goal: Confusion, delirium, dementia, or psychosis is improved or at baseline  Description: INTERVENTIONS:  1. Assess for possible contributors to thought disturbance, including medications, impaired vision or hearing, underlying metabolic abnormalities, dehydration, psychiatric diagnoses, and notify attending LIP  2. [place] high risk fall precautions, as indicated  3. Provide frequent short contacts to provide reality reorientation, refocusing and direction  4. Decrease environmental stimuli, including noise as appropriate  5. Monitor and intervene to maintain adequate nutrition, hydration, elimination, sleep and activity  6. If unable to ensure safety without constant attention obtain sitter and review sitter guidelines with assigned personnel  7. Initiate Psychosocial CNS and Spiritual Care consult, as indicated  Outcome: Progressing     Problem: Safety - Medical Restraint  Goal:

## 2024-12-19 NOTE — CARE COORDINATION
Eugenie received vmail from [facility], pt used SNF days from 12/6-12/11.  Writer spoke with [facility], they can accept pt.  CM will continue to follow pt's progress and coordinate discharge arrangements.  HANSA BenítezRN     [id number] Addendum:  Pt asked about [facility], writer explained difference between that level of care and SNF.  Pt agrees with going to [facility] for skilled stay.  MARKOS Benítez

## 2024-12-20 LAB
ANION GAP SERPL CALCULATED.3IONS-SCNC: 9 MMOL/L (ref 3–16)
BASOPHILS # BLD: 0.1 K/UL (ref 0–0.2)
BASOPHILS NFR BLD: 1.5 %
BUN SERPL-MCNC: 11 MG/DL (ref 7–20)
CALCIUM SERPL-MCNC: 8.5 MG/DL (ref 8.3–10.6)
CHLORIDE SERPL-SCNC: 106 MMOL/L (ref 99–110)
CO2 SERPL-SCNC: 27 MMOL/L (ref 21–32)
CREAT SERPL-MCNC: 1.3 MG/DL (ref 0.6–1.2)
DEPRECATED RDW RBC AUTO: 15.6 % (ref 12.4–15.4)
EOSINOPHIL # BLD: 0.3 K/UL (ref 0–0.6)
EOSINOPHIL NFR BLD: 3.3 %
GFR SERPLBLD CREATININE-BSD FMLA CKD-EPI: 41 ML/MIN/{1.73_M2}
GLUCOSE BLD-MCNC: 101 MG/DL (ref 70–99)
GLUCOSE BLD-MCNC: 102 MG/DL (ref 70–99)
GLUCOSE BLD-MCNC: 103 MG/DL (ref 70–99)
GLUCOSE BLD-MCNC: 114 MG/DL (ref 70–99)
GLUCOSE BLD-MCNC: 140 MG/DL (ref 70–99)
GLUCOSE SERPL-MCNC: 90 MG/DL (ref 70–99)
HCT VFR BLD AUTO: 29.9 % (ref 36–48)
HGB BLD-MCNC: 9.4 G/DL (ref 12–16)
LYMPHOCYTES # BLD: 1.9 K/UL (ref 1–5.1)
LYMPHOCYTES NFR BLD: 22.3 %
MCH RBC QN AUTO: 30.5 PG (ref 26–34)
MCHC RBC AUTO-ENTMCNC: 31.3 G/DL (ref 31–36)
MCV RBC AUTO: 97.6 FL (ref 80–100)
MONOCYTES # BLD: 0.8 K/UL (ref 0–1.3)
MONOCYTES NFR BLD: 9.9 %
NEUTROPHILS # BLD: 5.3 K/UL (ref 1.7–7.7)
NEUTROPHILS NFR BLD: 63 %
PERFORMED ON: ABNORMAL
PLATELET # BLD AUTO: 233 K/UL (ref 135–450)
PMV BLD AUTO: 8.5 FL (ref 5–10.5)
POTASSIUM SERPL-SCNC: 4.3 MMOL/L (ref 3.5–5.1)
RBC # BLD AUTO: 3.06 M/UL (ref 4–5.2)
SODIUM SERPL-SCNC: 142 MMOL/L (ref 136–145)
WBC # BLD AUTO: 8.4 K/UL (ref 4–11)

## 2024-12-20 PROCEDURE — 6370000000 HC RX 637 (ALT 250 FOR IP): Performed by: INTERNAL MEDICINE

## 2024-12-20 PROCEDURE — 2700000000 HC OXYGEN THERAPY PER DAY

## 2024-12-20 PROCEDURE — 97110 THERAPEUTIC EXERCISES: CPT

## 2024-12-20 PROCEDURE — 80048 BASIC METABOLIC PNL TOTAL CA: CPT

## 2024-12-20 PROCEDURE — 6370000000 HC RX 637 (ALT 250 FOR IP): Performed by: STUDENT IN AN ORGANIZED HEALTH CARE EDUCATION/TRAINING PROGRAM

## 2024-12-20 PROCEDURE — 6360000002 HC RX W HCPCS: Performed by: STUDENT IN AN ORGANIZED HEALTH CARE EDUCATION/TRAINING PROGRAM

## 2024-12-20 PROCEDURE — 2060000000 HC ICU INTERMEDIATE R&B

## 2024-12-20 PROCEDURE — 6360000002 HC RX W HCPCS: Performed by: INTERNAL MEDICINE

## 2024-12-20 PROCEDURE — 85025 COMPLETE CBC W/AUTO DIFF WBC: CPT

## 2024-12-20 PROCEDURE — 6360000002 HC RX W HCPCS

## 2024-12-20 PROCEDURE — 94761 N-INVAS EAR/PLS OXIMETRY MLT: CPT

## 2024-12-20 PROCEDURE — 2500000003 HC RX 250 WO HCPCS: Performed by: INTERNAL MEDICINE

## 2024-12-20 PROCEDURE — 2580000003 HC RX 258: Performed by: STUDENT IN AN ORGANIZED HEALTH CARE EDUCATION/TRAINING PROGRAM

## 2024-12-20 PROCEDURE — 94669 MECHANICAL CHEST WALL OSCILL: CPT

## 2024-12-20 PROCEDURE — 94640 AIRWAY INHALATION TREATMENT: CPT

## 2024-12-20 PROCEDURE — 97530 THERAPEUTIC ACTIVITIES: CPT

## 2024-12-20 PROCEDURE — 97535 SELF CARE MNGMENT TRAINING: CPT

## 2024-12-20 RX ORDER — ARFORMOTEROL TARTRATE 15 UG/2ML
15 SOLUTION RESPIRATORY (INHALATION)
Qty: 120 ML | Refills: 3 | Status: CANCELLED | OUTPATIENT
Start: 2024-12-20

## 2024-12-20 RX ORDER — TRAMADOL HYDROCHLORIDE 50 MG/1
50 TABLET ORAL EVERY 12 HOURS PRN
Qty: 6 TABLET | Refills: 0 | Status: CANCELLED | OUTPATIENT
Start: 2024-12-20 | End: 2024-12-23

## 2024-12-20 RX ORDER — SODIUM CHLORIDE 9 MG/ML
INJECTION, SOLUTION INTRAVENOUS CONTINUOUS
Status: ACTIVE | OUTPATIENT
Start: 2024-12-20 | End: 2024-12-21

## 2024-12-20 RX ORDER — PROCHLORPERAZINE EDISYLATE 5 MG/ML
10 INJECTION INTRAMUSCULAR; INTRAVENOUS EVERY 6 HOURS PRN
Status: DISCONTINUED | OUTPATIENT
Start: 2024-12-20 | End: 2024-12-21 | Stop reason: HOSPADM

## 2024-12-20 RX ADMIN — FUROSEMIDE 20 MG: 10 INJECTION, SOLUTION INTRAMUSCULAR; INTRAVENOUS at 10:01

## 2024-12-20 RX ADMIN — ARFORMOTEROL TARTRATE 15 MCG: 15 SOLUTION RESPIRATORY (INHALATION) at 20:30

## 2024-12-20 RX ADMIN — PROCHLORPERAZINE EDISYLATE 10 MG: 5 INJECTION INTRAMUSCULAR; INTRAVENOUS at 10:01

## 2024-12-20 RX ADMIN — ROFLUMILAST 500 MCG: 500 TABLET ORAL at 10:01

## 2024-12-20 RX ADMIN — FLUTICASONE PROPIONATE 1 SPRAY: 50 SPRAY, METERED NASAL at 21:03

## 2024-12-20 RX ADMIN — GUAIFENESIN 600 MG: 600 TABLET ORAL at 10:00

## 2024-12-20 RX ADMIN — GABAPENTIN 100 MG: 100 CAPSULE ORAL at 10:01

## 2024-12-20 RX ADMIN — ONDANSETRON 4 MG: 2 INJECTION INTRAMUSCULAR; INTRAVENOUS at 07:03

## 2024-12-20 RX ADMIN — ALBUTEROL SULFATE 2.5 MG: 2.5 SOLUTION RESPIRATORY (INHALATION) at 11:40

## 2024-12-20 RX ADMIN — SODIUM CHLORIDE, PRESERVATIVE FREE 10 ML: 5 INJECTION INTRAVENOUS at 21:01

## 2024-12-20 RX ADMIN — ALBUTEROL SULFATE 2.5 MG: 2.5 SOLUTION RESPIRATORY (INHALATION) at 16:06

## 2024-12-20 RX ADMIN — GABAPENTIN 100 MG: 100 CAPSULE ORAL at 16:02

## 2024-12-20 RX ADMIN — BUDESONIDE 500 MCG: 0.5 SUSPENSION RESPIRATORY (INHALATION) at 08:10

## 2024-12-20 RX ADMIN — METOPROLOL TARTRATE 12.5 MG: 25 TABLET, FILM COATED ORAL at 10:00

## 2024-12-20 RX ADMIN — APIXABAN 2.5 MG: 5 TABLET, FILM COATED ORAL at 10:00

## 2024-12-20 RX ADMIN — GABAPENTIN 100 MG: 100 CAPSULE ORAL at 21:03

## 2024-12-20 RX ADMIN — ESCITALOPRAM OXALATE 10 MG: 10 TABLET ORAL at 10:01

## 2024-12-20 RX ADMIN — ATORVASTATIN CALCIUM 40 MG: 40 TABLET, FILM COATED ORAL at 21:03

## 2024-12-20 RX ADMIN — BUDESONIDE 500 MCG: 0.5 SUSPENSION RESPIRATORY (INHALATION) at 20:30

## 2024-12-20 RX ADMIN — DOCUSATE SODIUM 100 MG: 100 CAPSULE, LIQUID FILLED ORAL at 10:00

## 2024-12-20 RX ADMIN — SODIUM CHLORIDE, PRESERVATIVE FREE 10 ML: 5 INJECTION INTRAVENOUS at 10:01

## 2024-12-20 RX ADMIN — ARFORMOTEROL TARTRATE 15 MCG: 15 SOLUTION RESPIRATORY (INHALATION) at 08:10

## 2024-12-20 RX ADMIN — ALBUTEROL SULFATE 2.5 MG: 2.5 SOLUTION RESPIRATORY (INHALATION) at 20:30

## 2024-12-20 RX ADMIN — PANTOPRAZOLE SODIUM 40 MG: 40 TABLET, DELAYED RELEASE ORAL at 10:00

## 2024-12-20 RX ADMIN — APIXABAN 2.5 MG: 5 TABLET, FILM COATED ORAL at 21:03

## 2024-12-20 RX ADMIN — METOPROLOL TARTRATE 12.5 MG: 25 TABLET, FILM COATED ORAL at 21:03

## 2024-12-20 RX ADMIN — LEVOTHYROXINE SODIUM 25 MCG: 0.03 TABLET ORAL at 10:00

## 2024-12-20 RX ADMIN — SODIUM CHLORIDE: 9 INJECTION, SOLUTION INTRAVENOUS at 13:30

## 2024-12-20 RX ADMIN — ALBUTEROL SULFATE 2.5 MG: 2.5 SOLUTION RESPIRATORY (INHALATION) at 08:10

## 2024-12-20 ASSESSMENT — PAIN SCALES - PAIN ASSESSMENT IN ADVANCED DEMENTIA (PAINAD)
FACIALEXPRESSION: SMILING OR INEXPRESSIVE
TOTALSCORE: 0
BREATHING: NORMAL
BODYLANGUAGE: RELAXED
CONSOLABILITY: NO NEED TO CONSOLE

## 2024-12-20 ASSESSMENT — PAIN DESCRIPTION - PAIN TYPE: TYPE: CHRONIC PAIN

## 2024-12-20 ASSESSMENT — PAIN SCALES - GENERAL: PAINLEVEL_OUTOF10: 2

## 2024-12-20 ASSESSMENT — PAIN DESCRIPTION - LOCATION: LOCATION: BACK

## 2024-12-20 ASSESSMENT — PAIN DESCRIPTION - ORIENTATION: ORIENTATION: MID

## 2024-12-20 NOTE — CARE COORDINATION
CM update LOS 9- Pt is from [facility] SNF x 5 days but want sot go to [facility]. [facility] is approved. Pt had MBS on soft foods now. Still being treated for NADJA. Pt having some N/V this am. Possible DC this weekend.

## 2024-12-20 NOTE — PROGRESS NOTES
12/20/24 1616   NIV Type   NIV Started/Stopped (S)  On   Equipment Type v60   Mode Bilevel   Mask Type Full face mask   Mask Size Small   Assessment   Level of Consciousness 0   Comfort Level Good   Using Accessory Muscles No   Settings/Measurements   PIP Observed 13 cm H20   IPAP 12 cmH20   CPAP/EPAP 6 cmH2O   Vt (Measured) 307 mL   Rate Ordered 8   Insp Rise Time (%) 3 %   FiO2  40 %   I Time/ I Time % 1 s   Minute Volume (L/min) 11.6 Liters   Mask Leak (lpm) 7 lpm   Patient's Home Machine No   Alarm Settings   Alarms On Y

## 2024-12-20 NOTE — DISCHARGE INSTR - DIET
Good nutrition is important when healing from an illness, injury, or surgery.  Follow any nutrition recommendations given to you during your hospital stay.   If you were given an oral nutrition supplement while in the hospital, continue to take this supplement at home.  You can take it with meals, in-between meals, and/or before bedtime. These supplements can be purchased at most local grocery stores, pharmacies, and [employer]st[employer].   If you have any questions about your diet or nutrition, call the hospital and ask for the dietitian.    Evaluated by Speech therapy, who recommend IDDSI 6 Soft and Bite Sized Solids (per pt preference d/t lack of dentition; Pt can tolerate regular solid); IDDSI 0 Thin Liquids-SMALL sips- Straws OK; Meds PO as tolerated

## 2024-12-20 NOTE — PROGRESS NOTES
Occupational Therapy  Facility/Department: [facility] C4 PCU  Daily Treatment Note  NAME: Terri Smith  : 1940  MRN: [id number]    Date of Service: 2024    Discharge Recommendations:  Subacute/Skilled Nursing Facility     Therapy discharge recommendations take into account each patient's current medical complexities and are subject to input/oversight from the patient's healthcare team.   Barriers to Home Discharge:   [x] Steps to access home entry or bed/bath:   [x] Unable to transfer, ambulate, or propel wheelchair household distances without assist   [x] Limited available assist at home upon discharge    [x] Patient or family requests d/c to post-acute facility    [x] Poor cognition/safety awareness for d/c to home alone   [] Unable to maintain ordered weight bearing status    [] Patient with salient signs of long-standing immobility   [x] Other: Decreased IND with ADLs      Patient Diagnosis(es): The primary encounter diagnosis was Pneumonia due to infectious organism, unspecified laterality, unspecified part of lung. Diagnoses of Shortness of breath, Septicemia (HCC), Dysphagia, unspecified type, and Melena were also pertinent to this visit.     Assessment   Assessment: Patient agreeable to OT session with encouragement. Patient seen as cotx with PT to maximize mobility while ensuring safety of patient and staff. Patient limited due to decreased activity tolerance. Patient reports feeling SOB despite SpO2 remaining >95% on 3L O2. At baseline patient ambulates household distances without AD and requires up to SBA with ADLs. Patient currently requiring up to Min A x2 people for simple stand pivot transfers. Patient with limited standing tolerance and overall activity tolerance requiring bed level toileting today. Patient currently requiring total A for bed level toileting and LB dressing. Patient continues to demonstrate performance component deficits in balance, activity tolerance, strength, safety.  bed level toileting with full assist for clothing management and pericare, total A x1 person  Functional Mobility: Other (Comment)  Functional Mobility Skilled Clinical Factors: Min A supine to EOB with bedrail. Min AX2 people stand pivot EOB to chair with HHA        Safety Devices  Type of Devices: All fall risk precautions in place;Call light within reach;Patient at risk for falls;Nurse notified;All danna prominences offloaded;Gait belt;Left in chair;Chair alarm in place    Patient Education  Education Given To: Patient  Education Provided: Role of Therapy;Plan of Care;Precautions;Orientation;Mobility Training;ADL Adaptive Strategies;Fall Prevention Strategies;Transfer Training;Energy Conservation  Education Provided Comments: Pt educated on importance of OOB mobility, prevention of complications of bedrest, and general safety during hospitalization, standing balance, body positioning for transfers, bed mobility, positioning to promote safe swallowing during feeding, energy conservation for toileting and dressing at bed level  Education Method: Demonstration;Verbal  Barriers to Learning: Cognition;Vision  Education Outcome: Verbalized understanding;Demonstrated understanding;Continued education needed    Goals  Short Term Goals  Time Frame for Short Term Goals: 2 weeks (12/26/24) - goals ongoing 12/20  Short Term Goal 1: Tolerate sitting EOB supervision x10 minutes in preparation for engagement in ADLs.  Short Term Goal 2: Mod A toileting  Short Term Goal 3: Mod A functional transfers to/from EOB, chair, toilet/BSC  Short Term Goal 4: SBA grooming seated EOB  Short Term Goal 5: Mod A total body dressing  Patient Goals   Patient goals : to be able to get out of bed on her own    AM-PAC - ADL  AM-PAC Daily Activity - Inpatient   How much help is needed for putting on and taking off regular lower body clothing?: Total  How much help is needed for bathing (which includes washing, rinsing, drying)?: A Lot  How much

## 2024-12-20 NOTE — PLAN OF CARE
Problem: Chronic Conditions and Co-morbidities  Goal: Patient's chronic conditions and co-morbidity symptoms are monitored and maintained or improved  Outcome: Progressing     Problem: Discharge Planning  Goal: Discharge to home or other facility with appropriate resources  Outcome: Progressing     Problem: Safety - Adult  Goal: Free from fall injury  Outcome: Progressing     Problem: Pain  Goal: Verbalizes/displays adequate comfort level or baseline comfort level  Outcome: Progressing     Problem: Skin/Tissue Integrity  Goal: Absence of new skin breakdown  Description: 1.  Monitor for areas of redness and/or skin breakdown  2.  Assess vascular access sites hourly  3.  Every 4-6 hours minimum:  Change oxygen saturation probe site  4.  Every 4-6 hours:  If on nasal continuous positive airway pressure, respiratory therapy assess nares and determine need for appliance change or resting period.  Outcome: Progressing     Problem: ABCDS Injury Assessment  Goal: Absence of physical injury  Outcome: Progressing     Problem: Confusion  Goal: Confusion, delirium, dementia, or psychosis is improved or at baseline  Description: INTERVENTIONS:  1. Assess for possible contributors to thought disturbance, including medications, impaired vision or hearing, underlying metabolic abnormalities, dehydration, psychiatric diagnoses, and notify attending LIP  2. [place] high risk fall precautions, as indicated  3. Provide frequent short contacts to provide reality reorientation, refocusing and direction  4. Decrease environmental stimuli, including noise as appropriate  5. Monitor and intervene to maintain adequate nutrition, hydration, elimination, sleep and activity  6. If unable to ensure safety without constant attention obtain sitter and review sitter guidelines with assigned personnel  7. Initiate Psychosocial CNS and Spiritual Care consult, as indicated  Outcome: Progressing     Problem: Safety - Medical Restraint  Goal:

## 2024-12-20 NOTE — PROGRESS NOTES
Physical Therapy  Facility/Department: [facility] C4 PCU  Daily Treatment Note  NAME: Terri Smith  : 1940  MRN: [id number]    Date of Service: 2024    Discharge Recommendations:  Subacute/Skilled Nursing Facility   PT Equipment Recommendations  Equipment Needed: No  Other: defer to next level of care.    Therapy discharge recommendations take into account each patient's current medical complexities and are subject to input/oversight from the patient's healthcare team.   Barriers to Home Discharge:   [x] Steps to access home entry or bed/bath:1 DIANE   [x] Unable to transfer, ambulate, or propel wheelchair household distances without assist   [x] Unable to perform ADLs without assist   [x] Limited available assist at home upon discharge    [] Patient or family requests d/c to post-acute facility    [] Poor cognition/safety awareness for d/c to home alone    [] Unable to maintain ordered weight bearing status    [x] Patient with salient signs of long-standing immobility   [] Other: pt at increased risk for falls.    Patient Diagnosis(es): The primary encounter diagnosis was Pneumonia due to infectious organism, unspecified laterality, unspecified part of lung. Diagnoses of Shortness of breath, Septicemia (HCC), Dysphagia, unspecified type, and Melena were also pertinent to this visit.    Assessment  Assessment: pt found in bed, agreeable to PT treatment, cleared for treatment by RN.  pt seen in conjunction with OT to maximize pt safety and mobility and safely progress mobility.  pt found to have soiled brief, performed several rolls to get old brief out nad get new brief on, OT providing pericare, PT assisting with rolling.  pt demonstrates MIN A for bed mobility, MIN A X2 for SPT to chair with HHA.  pt requires significnat rest breaks between activites, states, \"I just need a minute\" several times throughout treatment.  pt demonstrates significant weakness and increased fatigue with al activities and

## 2024-12-20 NOTE — PROGRESS NOTES
Comprehensive Nutrition Assessment    Type and Reason for Visit:  Reassess    Nutrition Recommendations/Plan:   Continue soft and bite sized diet w/ thin liquids (textures and consistencies per SLP recommendations).   Add Ensure Compact TID (prefers vanilla).   Encourage PO intake as tolerated.   Monitor nutrition adequacy, pertinent labs, bowel habits, wt changes, and clinical progress      Malnutrition Assessment:  Malnutrition Status:  Moderate malnutrition (12/17/24 1303)    Context:  Acute Illness     Findings of the 6 clinical characteristics of malnutrition:  Energy Intake:  50% or less of estimated energy requirements for 5 or more days (NPO from 12/13-17 and minimal PO intake after diet advancement)  Weight Loss:  Mild weight loss     Body Fat Loss:  Mild body fat loss (question natural aging versus malnutrition) Orbital   Muscle Mass Loss:  Mild muscle mass loss Temples (temporalis) (question natural aging versus malnutrition)  Fluid Accumulation:  Unable to assess (severitiy of edema not specified)     Strength:  Not Performed    Nutrition Assessment:    Follow-up (pt sleeping at time of visit). Diet has advanced from pureed pleasure feeds to soft and bite sized diet w/ thin liquids per SLP recommendations after MBSS on 12/18. Spoke w/ RN who reports pt did not eat much of breakfast this morning d/t nausea, emesis, and trouble breathing. One PO intake documented in EMR (26-50% of meal on 12/18). Wts appear to be trending up since previous visit on 12/17. Pt agreeable to Ensure Compact during previous admissions - will order to help better meet estimated nutritional needs during stay. Will continue to monitor.    Nutrition Related Findings:    Last BM documented 12/19 (on colace). Active BS. Cr: 1.3 and GFR: 41. Non-pitting BLE edema. Wound Type: Skin Tears (ulnar)       Current Nutrition Intake & Therapies:    Average Meal Intake: 26-50%  Average Supplements Intake: None Ordered  ADULT DIET; Dysphagia

## 2024-12-20 NOTE — PROGRESS NOTES
Physician Progress Note      PATIENT:               SHARON HANEY  CSN #:                  [id number]  :                       1940  ADMIT DATE:       2024 10:48 AM  DISCH DATE:  RESPONDING  PROVIDER #:        Mena Calzada          QUERY TEXT:    Dear Dr. Calzada,    Pt admitted with increased shortness of breath documentation reflects   Pneumonia. Pt noted to have Sepsis criteria documentation of septicemia, from   ED note. If possible, please document in the progress notes and discharge   summary if you are evaluating and /or treating any of the following:  The medical record reflects the following:  Risk Factors: Pneumonia COPD Chronic resp failure CAD CHF  Clinical Indicators: >145 RR 43 WBC 15,1, CXR- Increased linear   opacities in both lungs  Treatment: Ceftriaxone and azithromycin, monitoring on tele, Cardio consult,   pulmonary consult    Thank you,  Odessa Webb RN BSN CRCR  [email or web address]  Options provided:  -- Sepsis, due to MRSA  pneumonia, present on admission  -- Pneumonia without sepsis  -- Other - I will add my own diagnosis  -- Disagree - Not applicable / Not valid  -- Disagree - Clinically unable to determine / Unknown  -- Refer to Clinical Documentation Reviewer    PROVIDER RESPONSE TEXT:    This patient has sepsis due to MRSA pneumonia, which was present on admission.    Query created by: Odessa Webb on 2024 6:06 PM      Electronically signed by:  Mena Calzada 2024 10:25 AM

## 2024-12-20 NOTE — PLAN OF CARE
Problem: Chronic Conditions and Co-morbidities  Goal: Patient's chronic conditions and co-morbidity symptoms are monitored and maintained or improved  12/19/2024 2023 by Mann Tenorio RN  Outcome: Progressing  12/19/2024 0759 by Sydni Delacruz RN  Outcome: Progressing     Problem: Discharge Planning  Goal: Discharge to home or other facility with appropriate resources  12/19/2024 2023 by Mann Tenorio RN  Outcome: Progressing  12/19/2024 0759 by Sydni Delacruz RN  Outcome: Progressing     Problem: Safety - Adult  Goal: Free from fall injury  12/19/2024 2023 by Mann Tenorio RN  Outcome: Progressing  12/19/2024 0759 by Sydni Delacruz RN  Outcome: Progressing     Problem: Pain  Goal: Verbalizes/displays adequate comfort level or baseline comfort level  12/19/2024 2023 by Mann Tenorio RN  Outcome: Progressing  12/19/2024 0759 by Sydni Delacruz RN  Outcome: Progressing     Problem: Skin/Tissue Integrity  Goal: Absence of new skin breakdown  Description: 1.  Monitor for areas of redness and/or skin breakdown  2.  Assess vascular access sites hourly  3.  Every 4-6 hours minimum:  Change oxygen saturation probe site  4.  Every 4-6 hours:  If on nasal continuous positive airway pressure, respiratory therapy assess nares and determine need for appliance change or resting period.  12/19/2024 2023 by Mann Tenorio RN  Outcome: Progressing  12/19/2024 0759 by Sydni Delacruz RN  Outcome: Progressing     Problem: ABCDS Injury Assessment  Goal: Absence of physical injury  12/19/2024 2023 by Mann Tenorio RN  Outcome: Progressing  12/19/2024 0759 by Sydni Delacruz RN  Outcome: Progressing     Problem: Confusion  Goal: Confusion, delirium, dementia, or psychosis is improved or at baseline  Description: INTERVENTIONS:  1. Assess for possible contributors to thought disturbance, including medications, impaired vision or hearing, underlying metabolic abnormalities, dehydration, psychiatric diagnoses, and notify attending LIP  2. [place]  high risk fall precautions, as indicated  3. Provide frequent short contacts to provide reality reorientation, refocusing and direction  4. Decrease environmental stimuli, including noise as appropriate  5. Monitor and intervene to maintain adequate nutrition, hydration, elimination, sleep and activity  6. If unable to ensure safety without constant attention obtain sitter and review sitter guidelines with assigned personnel  7. Initiate Psychosocial CNS and Spiritual Care consult, as indicated  12/19/2024 2023 by Mann Tenorio RN  Outcome: Progressing  12/19/2024 0759 by Sydni Delacruz RN  Outcome: Progressing     Problem: Safety - Medical Restraint  Goal: Remains free of injury from restraints (Restraint for Interference with Medical Device)  Description: INTERVENTIONS:  1. Determine that other, less restrictive measures have been tried or would not be effective before applying the restraint  2. Evaluate the patient's condition at the time of restraint application  3. Inform patient/family regarding the reason for restraint  4. Q2H: Monitor safety, psychosocial status, comfort, nutrition and hydration  12/19/2024 2023 by Mann Tenorio RN  Outcome: Progressing  12/19/2024 0759 by Sydni Delacruz RN  Outcome: Progressing     Problem: Nutrition Deficit:  Goal: Optimize nutritional status  12/19/2024 2023 by Mann Tenorio RN  Outcome: Progressing  12/19/2024 0759 by Sydni Delacruz RN  Outcome: Progressing

## 2024-12-20 NOTE — PROGRESS NOTES
V2.0  I want a DEXA but sometimes actually essentially low rates of admissions minutes  Or with bad weather  [facility] Progress Note      Name:  Terri Smith /Age/Sex: 1940  (84 y.o. female)   MRN & CSN:  [id number] & [id number] Encounter Date/Time: 2024 9:51 AM EST   Location:  [address] [place], [place] [address] PCP: Jackson Ontiveros MD     Attending:Maurisio Lay DO       Hospital Day: 10    Assessment and Recommendations   Terri Smith is a 84 y.o. female with pmh of atrial fibrillation, CAD, CHF, COPD, GERD, hypothyroidism who presents with Pneumonia, unspecified organism and pulmonary edema.    Interval history: No acute events overnight. VSS on 2 L NC. SLP evaluation yesterday. Recommend NPO. MBSS ordered.     Plan:    Hypoxic and hypercapnic respiratory failure.    Patient comfortable on 2L o2 NC. Will continue on discharge.   continue mucoid nebulization. Likely secondary to underlying pneumonia, also with hx of underlying COPD.    Continue Roflumilast, BIPAP  Pulmonology consulted. Recommend finishing her abx course  Vancomycin (completed ) for PNA. Pulmonary signed off.   Per CM, ok for discharge to SNF ([facility]). Dispo pending resolution of NADJA. Anticipate d/c  when Cr improved. DC orders pended in chart.   - Is on 2-3L O2 NC at b/l, will resume at d/c to SNF.      Community-acquired pneumonia.   CXR > airspace opacities  Completed IV vanc per above. No PO antibiotics needed on discharge.   Repeat chest x-ray  showed progression of airspace opacities. Repeat chest x-ray () showed minimal new opacity in left medial lung base  MRSA screen. Positive  Monitor leukocytosis.  Recurrent choking episode/aspiration  SLP and Diet consulted. Initially made NPO, ok for Pureed with thin liquids. MBSS completed , rec soft and bite sized solids with thin liquids and PO meds as tolerated.     Paroxysmal atrial fibrillation with rapid ventricular response.  On Metoprolol and Eliquis at

## 2024-12-20 NOTE — PROGRESS NOTES
Pt complaining of shortness of breath, gave breathing tx and attempted to put bipap on but patient refused. Pt also sounding audibly coarse, clears with cough briefly but comes back. RN aware

## 2024-12-20 NOTE — DISCHARGE INSTR - COC
Continuity of Care Form    Patient Name: Terri Smith   :  1940  MRN:  [id number]    Admit date:  2024  Discharge date:  24    Code Status Order: Full Code   Advance Directives:   Advance Care Flowsheet Documentation        Date/Time Healthcare Directive Type of Healthcare Directive Copy in Chart Healthcare Agent Appointed Healthcare Agent's Name Healthcare Agent's Phone Number    24 1234 No, patient does not have an advance directive for healthcare treatment  --  --  --  --  --                     Admitting Physician:  Maurisio Lay DO  PCP: Jackson Ontiveros MD    Discharging Nurse: Kailyn Acevedo  Discharging Hospital Unit/Room#: 0442/0442-01  Discharging Unit Phone Number: [phone]    Emergency Contact:   Extended Emergency Contact Information  Primary Emergency Contact: Juan Smith   [facility]  Home Phone: [phone]  Relation: Child  Secondary Emergency Contact: Hafsa Tapia  Home Phone: [phone]  Mobile Phone: [phone]  Relation: Grandchild    Past Surgical History:  Past Surgical History:   Procedure Laterality Date    BRONCHOSCOPY  2019    Dr. Wheatley - w/BAL    CARDIAC CATHETERIZATION  2019    Dr. Palomares     SECTION      COLONOSCOPY N/A 2020    COLONOSCOPY DIAGNOSTIC performed by Rowdy Schmitz DO at [facility] ENDOSCOPY    COLONOSCOPY N/A 10/22/2021    COLONOSCOPY POLYPECTOMY SNARE/COLD BIOPSY performed by Celestine Lester MD at [facility] ENDOSCOPY    COLONOSCOPY N/A 2023    COLONOSCOPY POLYPECTOMY SNARE performed by Darron Langford MD at [facility] ENDOSCOPY    COLONOSCOPY N/A 2024    COLONOSCOPY POLYPECTOMY SNARE/BIOPSY performed by Calvin Lopez MD at [facility] ENDOSCOPY    CORONARY ARTERY BYPASS GRAFT N/A 2019    OFF PUMP CORONARY ARTERY BYPASS GRAFTING X2, INTERNAL MAMMARY ARTERY, SAPHENOUS VEIN GRAFT performed by Yolanda Chase MD at [facility] CVOR    IR MIDLINE CATH  2021     COVID-19 virus infection U07.1    SOB (shortness of breath) R06.02    Pulmonary venous congestion R09.89    Pulmonary HTN (HCC) I27.20    Centrilobular emphysema (HCC) J43.2    Acute on chronic anemia D64.9    Other hemorrhoids K64.8    Anticoagulated Z79.01    History of GI bleed Z87.19    Chest pain due to CAD ([facility]) I25.119    Acute on chronic heart failure with normal ejection fraction ([facility]) I50.33    Vertigo R42    Pneumonia, unspecified organism J18.9    Acute hypoxic respiratory failure J96.01       Isolation/Infection:   Isolation            Contact          Patient Infection Status       Infection Onset Added Last Indicated Last Indicated By Review Planned Expiration Resolved Resolved By    MRSA 24 Culture, MRSA, Screening        MDRO (multi-drug resistant organism) 22 Culture, Urine        Resolved    COVID-19 24 COVID-19 & Influenza Combo   24 Infection     C-diff (Clostridium difficile) 03/09/22 03/10/22 03/09/22 C. difficile toxin Molecular   22 Santosh Hackett, SUJIT    MRSA 19 Respiratory Culture   21 Santosh Hackett, RN                       Nurse Assessment:  Last Vital Signs: /65   Pulse 90   Temp 97.7 °F (36.5 °C) (Oral)   Resp 16   Ht 1.626 m (5' 4.02\")   Wt 48.3 kg (106 lb 7.7 oz)   SpO2 96%   BMI 18.27 kg/m²     Last documented pain score (0-10 scale): Pain Level: 2  Last Weight:   Wt Readings from Last 1 Encounters:   24 48.3 kg (106 lb 7.7 oz)     Mental Status:  oriented, alert, and coherent    IV Access:  - Midline access Right arm    Nursing Mobility/ADLs:  Walking   Dependent  Transfer  Dependent  Bathing  Dependent  Dressing  Dependent  Toileting  Dependent  Feeding  Dependent  Med Admin  Assisted  Med Delivery   whole and in applesauce    Wound Care Documentation and Therapy:  Wound 23 Buttocks Right (Active)   Number of days: 501       Wound 24

## 2024-12-21 VITALS
RESPIRATION RATE: 19 BRPM | TEMPERATURE: 98.9 F | WEIGHT: 104.5 LBS | OXYGEN SATURATION: 98 % | HEART RATE: 81 BPM | BODY MASS INDEX: 17.84 KG/M2 | SYSTOLIC BLOOD PRESSURE: 117 MMHG | HEIGHT: 64 IN | DIASTOLIC BLOOD PRESSURE: 69 MMHG

## 2024-12-21 LAB
ANION GAP SERPL CALCULATED.3IONS-SCNC: 9 MMOL/L (ref 3–16)
BASOPHILS # BLD: 0.1 K/UL (ref 0–0.2)
BASOPHILS NFR BLD: 0.4 %
BUN SERPL-MCNC: 9 MG/DL (ref 7–20)
CALCIUM SERPL-MCNC: 8.7 MG/DL (ref 8.3–10.6)
CHLORIDE SERPL-SCNC: 106 MMOL/L (ref 99–110)
CO2 SERPL-SCNC: 27 MMOL/L (ref 21–32)
CREAT SERPL-MCNC: 1.1 MG/DL (ref 0.6–1.2)
DEPRECATED RDW RBC AUTO: 15.2 % (ref 12.4–15.4)
EOSINOPHIL # BLD: 0.2 K/UL (ref 0–0.6)
EOSINOPHIL NFR BLD: 1.6 %
GFR SERPLBLD CREATININE-BSD FMLA CKD-EPI: 50 ML/MIN/{1.73_M2}
GLUCOSE BLD-MCNC: 106 MG/DL (ref 70–99)
GLUCOSE BLD-MCNC: 111 MG/DL (ref 70–99)
GLUCOSE BLD-MCNC: 115 MG/DL (ref 70–99)
GLUCOSE BLD-MCNC: 176 MG/DL (ref 70–99)
GLUCOSE SERPL-MCNC: 109 MG/DL (ref 70–99)
HCT VFR BLD AUTO: 30.4 % (ref 36–48)
HGB BLD-MCNC: 9.5 G/DL (ref 12–16)
LYMPHOCYTES # BLD: 1.6 K/UL (ref 1–5.1)
LYMPHOCYTES NFR BLD: 12.4 %
MCH RBC QN AUTO: 30.1 PG (ref 26–34)
MCHC RBC AUTO-ENTMCNC: 31.3 G/DL (ref 31–36)
MCV RBC AUTO: 96.1 FL (ref 80–100)
MONOCYTES # BLD: 0.9 K/UL (ref 0–1.3)
MONOCYTES NFR BLD: 6.8 %
NEUTROPHILS # BLD: 10 K/UL (ref 1.7–7.7)
NEUTROPHILS NFR BLD: 78.8 %
PERFORMED ON: ABNORMAL
PLATELET # BLD AUTO: 362 K/UL (ref 135–450)
PMV BLD AUTO: 7.5 FL (ref 5–10.5)
POTASSIUM SERPL-SCNC: 4.1 MMOL/L (ref 3.5–5.1)
RBC # BLD AUTO: 3.16 M/UL (ref 4–5.2)
SODIUM SERPL-SCNC: 142 MMOL/L (ref 136–145)
WBC # BLD AUTO: 12.7 K/UL (ref 4–11)

## 2024-12-21 PROCEDURE — 94640 AIRWAY INHALATION TREATMENT: CPT

## 2024-12-21 PROCEDURE — 6370000000 HC RX 637 (ALT 250 FOR IP): Performed by: NURSE PRACTITIONER

## 2024-12-21 PROCEDURE — 85025 COMPLETE CBC W/AUTO DIFF WBC: CPT

## 2024-12-21 PROCEDURE — 6370000000 HC RX 637 (ALT 250 FOR IP): Performed by: INTERNAL MEDICINE

## 2024-12-21 PROCEDURE — 36415 COLL VENOUS BLD VENIPUNCTURE: CPT

## 2024-12-21 PROCEDURE — 94660 CPAP INITIATION&MGMT: CPT

## 2024-12-21 PROCEDURE — 94669 MECHANICAL CHEST WALL OSCILL: CPT

## 2024-12-21 PROCEDURE — 6360000002 HC RX W HCPCS

## 2024-12-21 PROCEDURE — 2700000000 HC OXYGEN THERAPY PER DAY

## 2024-12-21 PROCEDURE — 94761 N-INVAS EAR/PLS OXIMETRY MLT: CPT

## 2024-12-21 PROCEDURE — 80048 BASIC METABOLIC PNL TOTAL CA: CPT

## 2024-12-21 PROCEDURE — 6360000002 HC RX W HCPCS: Performed by: INTERNAL MEDICINE

## 2024-12-21 PROCEDURE — 6370000000 HC RX 637 (ALT 250 FOR IP): Performed by: STUDENT IN AN ORGANIZED HEALTH CARE EDUCATION/TRAINING PROGRAM

## 2024-12-21 PROCEDURE — 2580000003 HC RX 258: Performed by: STUDENT IN AN ORGANIZED HEALTH CARE EDUCATION/TRAINING PROGRAM

## 2024-12-21 PROCEDURE — 6360000002 HC RX W HCPCS: Performed by: STUDENT IN AN ORGANIZED HEALTH CARE EDUCATION/TRAINING PROGRAM

## 2024-12-21 PROCEDURE — 2500000003 HC RX 250 WO HCPCS: Performed by: INTERNAL MEDICINE

## 2024-12-21 RX ORDER — PROCHLORPERAZINE MALEATE 10 MG
5 TABLET ORAL EVERY 6 HOURS PRN
Qty: 40 TABLET | Refills: 0 | Status: SHIPPED | OUTPATIENT
Start: 2024-12-21

## 2024-12-21 RX ORDER — POLYETHYLENE GLYCOL 3350 17 G/17G
17 POWDER, FOR SOLUTION ORAL DAILY PRN
Qty: 30 PACKET | Refills: 0 | Status: SHIPPED | OUTPATIENT
Start: 2024-12-21 | End: 2025-01-20

## 2024-12-21 RX ADMIN — ARFORMOTEROL TARTRATE 15 MCG: 15 SOLUTION RESPIRATORY (INHALATION) at 08:15

## 2024-12-21 RX ADMIN — GUAIFENESIN 600 MG: 600 TABLET ORAL at 08:31

## 2024-12-21 RX ADMIN — LEVOTHYROXINE SODIUM 25 MCG: 0.03 TABLET ORAL at 05:59

## 2024-12-21 RX ADMIN — ESCITALOPRAM OXALATE 10 MG: 10 TABLET ORAL at 08:31

## 2024-12-21 RX ADMIN — GABAPENTIN 100 MG: 100 CAPSULE ORAL at 08:31

## 2024-12-21 RX ADMIN — SODIUM CHLORIDE: 9 INJECTION, SOLUTION INTRAVENOUS at 03:35

## 2024-12-21 RX ADMIN — APIXABAN 2.5 MG: 5 TABLET, FILM COATED ORAL at 08:31

## 2024-12-21 RX ADMIN — METOPROLOL TARTRATE 12.5 MG: 25 TABLET, FILM COATED ORAL at 08:31

## 2024-12-21 RX ADMIN — GABAPENTIN 100 MG: 100 CAPSULE ORAL at 14:51

## 2024-12-21 RX ADMIN — TRAMADOL HYDROCHLORIDE 50 MG: 50 TABLET, COATED ORAL at 19:59

## 2024-12-21 RX ADMIN — FLUTICASONE PROPIONATE 1 SPRAY: 50 SPRAY, METERED NASAL at 17:19

## 2024-12-21 RX ADMIN — ALBUTEROL SULFATE 2.5 MG: 2.5 SOLUTION RESPIRATORY (INHALATION) at 06:24

## 2024-12-21 RX ADMIN — ALBUTEROL SULFATE 2.5 MG: 2.5 SOLUTION RESPIRATORY (INHALATION) at 11:49

## 2024-12-21 RX ADMIN — PANTOPRAZOLE SODIUM 40 MG: 40 TABLET, DELAYED RELEASE ORAL at 05:59

## 2024-12-21 RX ADMIN — FUROSEMIDE 20 MG: 10 INJECTION, SOLUTION INTRAMUSCULAR; INTRAVENOUS at 08:30

## 2024-12-21 RX ADMIN — BUDESONIDE 500 MCG: 0.5 SUSPENSION RESPIRATORY (INHALATION) at 08:15

## 2024-12-21 RX ADMIN — SODIUM CHLORIDE, PRESERVATIVE FREE 10 ML: 5 INJECTION INTRAVENOUS at 08:36

## 2024-12-21 RX ADMIN — BUSPIRONE HYDROCHLORIDE 10 MG: 5 TABLET ORAL at 16:56

## 2024-12-21 RX ADMIN — ROFLUMILAST 500 MCG: 500 TABLET ORAL at 08:40

## 2024-12-21 RX ADMIN — ALBUTEROL SULFATE 2.5 MG: 2.5 SOLUTION RESPIRATORY (INHALATION) at 15:38

## 2024-12-21 RX ADMIN — DOCUSATE SODIUM 100 MG: 100 CAPSULE, LIQUID FILLED ORAL at 08:31

## 2024-12-21 ASSESSMENT — PAIN SCALES - GENERAL: PAINLEVEL_OUTOF10: 4

## 2024-12-21 NOTE — PROGRESS NOTES
12/21/24 0122   NIV Type   NIV Started/Stopped On   Equipment Type v60   Mode Bilevel   Mask Type Full face mask   Assessment   Pulse 93   Respirations 30   SpO2 98 %   Comfort Level Fair   Using Accessory Muscles No   Mask Compliance Fair   Skin Assessment Clean, dry, & intact   Skin Protection for O2 Device Yes   Location Nose   Settings/Measurements   PIP Observed 11 cm H20   IPAP 12 cmH20   CPAP/EPAP 6 cmH2O   Vt (Measured) 384 mL   Rate Ordered 8   Insp Rise Time (%) 3 %   FiO2  30 %   I Time/ I Time % 1 s   Minute Volume (L/min) 11.6 Liters   Mask Leak (lpm) 5 lpm   Patient's Home Machine No   Alarm Settings   Alarms On Y   Low Pressure (cmH2O) 6 cmH2O   High Pressure (cmH2O) 30 cmH2O   Delay Alarm 20 sec(s)   RR Low (bpm) 6   RR High (bpm) 45 br/min

## 2024-12-21 NOTE — PROGRESS NOTES
Came to place pt on her bipap, woke pt  to tell her why I was there, and she has refused the bipap at this time, RN made aware

## 2024-12-21 NOTE — FLOWSHEET NOTE
12/20/24 2059   Vital Signs   Temp 98 °F (36.7 °C)   Temp Source Oral   Pulse 94   Heart Rate Source Monitor   Respirations 20   /74   MAP (Calculated) 94   BP Location Left upper arm   BP Method Automatic   Patient Position High fowlers   Pain Assessment   Pain Assessment None - Denies Pain   Oxygen Therapy   SpO2 100 %   O2 Device Nasal cannula   O2 Flow Rate (L/min) 2 L/min     Pt resting quietly in bed, slightly anxious. Kept her comfortable in bed until calm . Pt  cooperative. Updated with POC. Verbalized understanding. Had her breathing tx., per Pt \" I feel better\" @ 2.5 L O2. Pt has no c/o voiced at this time. Safety/fall prevention maintained. Personal belongings and call light within reach. JANI

## 2024-12-21 NOTE — PROGRESS NOTES
RN called to inform this RT the pt was now ready to go on bipap after refusing to be placed on it earlier.  Placed pt on at approx 0120

## 2024-12-21 NOTE — PROGRESS NOTES
12/21/24 0339   NIV Type   $NIV $Daily Charge   Equipment Type V60   Mode Bilevel   Mask Type Full face mask   Mask Size Small   Assessment   Pulse 76   Respirations 24   SpO2 97 %   Comfort Level Good   Using Accessory Muscles No   Mask Compliance Good   Skin Assessment Clean, dry, & intact   Skin Protection for O2 Device Yes   Location Nose   Settings/Measurements   PIP Observed 12 cm H20   IPAP 12 cmH20   CPAP/EPAP 6 cmH2O   Vt (Measured) 282 mL   FiO2  30 %   Minute Volume (L/min) 7 Liters   Mask Leak (lpm) 42 lpm   Patient's Home Machine No   Alarm Settings   Alarms On Y

## 2024-12-21 NOTE — PLAN OF CARE
Problem: Chronic Conditions and Co-morbidities  Goal: Patient's chronic conditions and co-morbidity symptoms are monitored and maintained or improved  Recent Flowsheet Documentation  Taken 12/20/2024 1940 by Go Dobbs RN  Care Plan - Patient's Chronic Conditions and Co-Morbidity Symptoms are Monitored and Maintained or Improved:   Monitor and assess patient's chronic conditions and comorbid symptoms for stability, deterioration, or improvement   Collaborate with multidisciplinary team to address chronic and comorbid conditions and prevent exacerbation or deterioration   Update acute care plan with appropriate goals if chronic or comorbid symptoms are exacerbated and prevent overall improvement and discharge  Outcome: Progressing     Problem: Discharge Planning  Goal: Discharge to home or other facility with appropriate resources  12/21/2024 0435 by Go Dobbs RN  Outcome: Progressing  12/20/2024 2125 by Go Dobbs RN  Outcome: Progressing  Flowsheets (Taken 12/20/2024 1940)  Discharge to home or other facility with appropriate resources:   Identify barriers to discharge with patient and caregiver   Arrange for needed discharge resources and transportation as appropriate   Identify discharge learning needs (meds, wound care, etc)   Refer to discharge planning if patient needs post-hospital services based on physician order or complex needs related to functional status, cognitive ability or social support system  Outcome: Progressing     Problem: Safety - Adult  Goal: Free from fall injury  12/21/2024 0435 by Go Dobbs RN  Outcome: Progressing  12/20/2024 2125 by Go Dobbs RN  Outcome: Progressing       Problem: Pain  Goal: Verbalizes/displays adequate comfort level or baseline comfort level  12/21/2024 0435 by Go Dobbs RN  Outcome: Progressing  12/20/2024 2125 by Go Dobbs RN  Outcome: Progressing       Problem: Skin/Tissue Integrity  Goal: Absence of new skin  Progressing     Problem: Metabolic/Fluid and Electrolytes - Adult  Goal: Electrolytes maintained within normal limits  Outcome: Progressing  Flowsheets (Taken 12/20/2024 1940)  Electrolytes maintained within normal limits: Monitor labs and assess patient for signs and symptoms of electrolyte imbalances  Goal: Hemodynamic stability and optimal renal function maintained  Outcome: Progressing  Goal: Glucose maintained within prescribed range  Outcome: Progressing

## 2024-12-21 NOTE — CARE COORDINATION
CASE MANAGEMENT DISCHARGE SUMMARY      Discharge to: SNF @ [facility]     Precertification completed: N/A  Hospital Exemption Notification (HENS) completed: yes    Transportation:    Family/car: no   Medical Transport explained to pt/family. Pt/family voice no agency preference.    Agency used: [facility]   time: 1715   Ambulance form completed: Yes    Confirmed discharge plan with:     Patient: yes per RN     Family:  yes dtr Juan     Facility/Agency, name: [facility]  LUPE/AVS faxed   Phone number for report to facility: [phone]     RN, name: Kailyn BECKETT    Note: Discharging nurse to complete LUPE, reconcile AVS, and place final copy with patient's discharge packet. RN to ensure that written prescriptions for  Level II medications are sent with patient to the facility as per protocol.

## 2024-12-21 NOTE — DISCHARGE SUMMARY
V2.0  Discharge Summary    Name:  Terri Smith /Age/Sex: 1940 (84 y.o. female)   Admit Date: 2024  Discharge Date: 24    MRN & CSN:  [id number] & [id number] Encounter Date and Time 24 2:46 PM EST    Attending:  Maurisio Lay DO Discharging Provider: Александр Zuniga DO       Hospital Course:     Brief HPI: Terri Smith is a 84 y.o. female who presented with worsening shortness of breath and fatigue.    Terri Smith is a 84 y.o. female who presents with worsening shortness of breath and fatigue. Earlier this month she has a history of admission for dizziness and hypotension and she was discharged after evaluation of upper GI bleed with EGD and antihypertensive management. She was discharged to SNF and now presented with worsening of shortness of breath and fatigue but she denied having any chest pain or pedal edema. On arrival she has heart rate 125, diltiazem given and shortness of breath was managed with respiratory nebulization. Chest x-ray showed bilateral pulmonary infiltrates concerning for pneumonia/pulmonary edema.     Brief Problem Based Course:   Acute respiratory failure resolved  Likely secondary to pneumonia  Patient comfortable on 2 L of O2 nasal cannula will continue on discharge  Per  okay to discharge to SNF  Community-acquired pneumonia resolved  Chest x-ray showed airspace opacities  Completed IV vancomycin on 2024, no p.o. antibiotics needed on discharge  MRSA screen was positive  SLP and diet consulted due to aspiration, new diet soft and bite-size solids with thin liquids p.o. meds as tolerated.  Paroxysmal A-fib with RVR ongoing  On metoprolol and Eliquis at home  Cardio electrophysiology consulted advised to continue current regimen of metoprolol 12.5 mg twice daily  Continue furosemide 20 mg once daily  Hypoglycemia resolved  Resolved prior to discharge  NADJA resolved  Ongoing since , creatinine 1.3-1 0.6-1.3.  Discharge on  Rare 08/30/2023 01:50 PM    YEAST Present 10/12/2021 04:05 AM    BACTERIA 2+ 08/30/2023 01:50 PM    CLARITYU Clear 12/01/2024 10:46 AM    SPECGRAV <1.005 05/07/2013 01:35 PM    LEUKOCYTESUR Negative 12/01/2024 10:46 AM    UROBILINOGEN 0.2 12/01/2024 10:46 AM    BILIRUBINUR Negative 12/01/2024 10:46 AM    BLOODU Negative 12/01/2024 10:46 AM    GLUCOSEU Negative 12/01/2024 10:46 AM    KETUA Negative 12/01/2024 10:46 AM    AMORPHOUS 2+ 07/05/2023 06:11 PM     Urine Cultures:   Lab Results   Component Value Date/Time    LABURIN  08/30/2023 02:09 PM     <10,000 CFU/ml mixed skin/urogenital milad. No further workup     Blood Cultures:   Lab Results   Component Value Date/Time    BC No Growth after 4 days of incubation. 12/11/2024 11:21 AM     Lab Results   Component Value Date/Time    BLOODCULT2 No Growth after 4 days of incubation. 12/11/2024 11:21 AM     Organism:   Lab Results   Component Value Date/Time    ORG Staph aureus MRSA 12/11/2024 06:43 PM       Electronically signed by Александр Zuniga DO on 12/21/2024 at 2:46 PM

## 2024-12-24 LAB
A/G RATIO: 1 RATIO (ref 0.8–2.6)
A/G RATIO: 1.1 RATIO (ref 0.8–2.6)
ALBUMIN: 2.6 G/DL (ref 3.5–5.2)
ALBUMIN: 2.7 G/DL (ref 3.5–5.2)
ALP BLD-CCNC: 81 U/L (ref 23–144)
ALP BLD-CCNC: 84 U/L (ref 23–144)
ALT SERPL-CCNC: 7 U/L (ref 0–60)
ALT SERPL-CCNC: 7 U/L (ref 0–60)
AST SERPL-CCNC: 11 U/L (ref 0–55)
AST SERPL-CCNC: 12 U/L (ref 0–55)
BASOPHILS ABSOLUTE: 0 K/UL (ref 0–0.3)
BASOPHILS RELATIVE PERCENT: 0.4 % (ref 0–2)
BILIRUB SERPL-MCNC: 0.2 MG/DL (ref 0–1.2)
BILIRUB SERPL-MCNC: 0.2 MG/DL (ref 0–1.2)
BILIRUBIN DIRECT: <0.2 MG/DL (ref 0–0.4)
BILIRUBIN, INDIRECT: ABNORMAL MG/DL (ref 0–1.2)
BUN / CREAT RATIO: 8 (ref 7–25)
BUN BLDV-MCNC: 11 MG/DL (ref 3–29)
CALCIUM SERPL-MCNC: 8.9 MG/DL (ref 8.5–10.5)
CHLORIDE BLD-SCNC: 101 MEQ/L (ref 96–110)
CHOLESTEROL, TOTAL: 106 MG/DL
CO2: 33 MEQ/L (ref 19–32)
CREAT SERPL-MCNC: 1.4 MG/DL (ref 0.5–1.2)
DIFFERENTIAL COUNT: ABNORMAL
EOSINOPHILS ABSOLUTE: 0.1 K/UL (ref 0–0.5)
EOSINOPHILS RELATIVE PERCENT: 1.1 % (ref 0–5)
ESTIMATED GLOMERULAR FILTRATION RATE CREATININE EQUATION: 37 MLS/MIN/1.73M2
FASTING STATUS: ABNORMAL
GLOBULIN: 2.5 G/DL (ref 1.9–3.6)
GLOBULIN: 2.5 G/DL (ref 1.9–3.6)
GLUCOSE BLD-MCNC: 95 MG/DL (ref 70–99)
HCT VFR BLD CALC: 27.6 % (ref 34–49)
HDLC SERPL-MCNC: 50 MG/DL
HEMOGLOBIN: 8.3 G/DL (ref 11.2–15.7)
IMMATURE GRANS (ABS): 0 K/UL (ref 0–0.1)
IMMATURE GRANULOCYTES %: 0.3 %
LDL CHOLESTEROL: 40 MG/DL
LYMPHOCYTES ABSOLUTE: 2.1 K/UL (ref 0.9–4.1)
LYMPHOCYTES RELATIVE PERCENT: 20.6 % (ref 14–51)
MAGNESIUM: 1.4 MG/DL (ref 1.4–2.5)
MCH RBC QN AUTO: 28.7 PG (ref 26–34)
MCHC RBC AUTO-ENTMCNC: 30.1 G/DL (ref 30.7–35.5)
MCV RBC AUTO: 95.5 FL (ref 80–100)
MONOCYTES ABSOLUTE: 0.6 K/UL (ref 0.2–1)
MONOCYTES RELATIVE PERCENT: 5.8 % (ref 4–12)
NEUTROPHILS ABSOLUTE: 7.2 K/UL (ref 1.8–7.5)
NEUTROPHILS RELATIVE PERCENT: 71.8 % (ref 42–80)
PDW BLD-RTO: 13 %
PLATELET # BLD: 328 K/UL (ref 140–400)
PMV BLD AUTO: 10.4 FL (ref 7.2–11.7)
POTASSIUM SERPL-SCNC: 4.6 MEQ/L (ref 3.4–5.3)
RBC # BLD: 2.89 M/UL (ref 3.95–5.26)
RETICULOCYTE ABSOLUTE COUNT: 0 /100 WBC
SODIUM BLD-SCNC: 144 MEQ/L (ref 135–148)
TOTAL PROTEIN: 5.1 G/DL (ref 6–8.3)
TOTAL PROTEIN: 5.2 G/DL (ref 6–8.3)
TRIGL SERPL-MCNC: 80 MG/DL
TSH ULTRASENSITIVE: 0.49 MCIU/ML (ref 0.4–4.5)
VLDLC SERPL CALC-MCNC: 16 MG/DL (ref 4–38)
WBC # BLD: 10.1 K/UL (ref 3.5–10.9)

## 2024-12-27 NOTE — PROGRESS NOTES
Physician Progress Note      PATIENT:               SHARON HANEY  CSN #:                  [id number]  :                       1940  ADMIT DATE:       2024 8:15 AM  DISCH DATE:        2024 6:34 PM  RESPONDING  PROVIDER #:        Martina Kumar MD          QUERY TEXT:    Pt admitted with increased weakness, dizziness and difficulty walking/vertigo.    Pt noted to have NADJA.  If possible, please document in progress notes and   discharge summary the relationship, if any, between weakness/vertigo and   NADJA/hypovolemia.    The medical record reflects the following:  Risk Factors: COPD/on home oxygen 2 liters, HFpEF, CKD, paroxysmal A-fib,   Chronic kidney disease stage 3b  Clinical Indicators: Current Principal Problem:  Vertigo- Vertigo,   improved-NADJA on CKD, improved  Nephrology-+weak, intake reduced.  Renal   function better with IV fluids, BP's low, getting additional fluids today. SCr   up to 2.0, better with fluids, back below baseline of 1.4-1.6\"  Treatment: Encourage hydration, PO IVF as needed. Antiemetics PRN in place,    Meclizine as needed,  Continue to monitor  Orthostatic vital signs    Thank-You, Ameena Salomon RN, BSN, CCDS  Options provided:  -- Generalized weakness and vertigo  due to NADJA, and hypovolemia, resolved.  -- Generalized weakness/Vertigo due to other cause, please specify other cause  -- Other - I will add my own diagnosis  -- Disagree - Not applicable / Not valid  -- Disagree - Clinically unable to determine / Unknown  -- Refer to Clinical Documentation Reviewer    PROVIDER RESPONSE TEXT:    This patient has generalized weakness due to nadja and hypovolemia, resolved.    Query created by: Pau Salomon on 2024 9:21 AM      Electronically signed by:  Martina Kumar MD 2024 10:21 AM

## 2025-01-03 ENCOUNTER — APPOINTMENT (OUTPATIENT)
Dept: CT IMAGING | Age: 85
DRG: 444 | End: 2025-01-03
Payer: MEDICARE

## 2025-01-03 ENCOUNTER — HOSPITAL ENCOUNTER (INPATIENT)
Age: 85
LOS: 23 days | Discharge: HOSPICE | DRG: 444 | End: 2025-01-27
Attending: EMERGENCY MEDICINE | Admitting: STUDENT IN AN ORGANIZED HEALTH CARE EDUCATION/TRAINING PROGRAM
Payer: MEDICARE

## 2025-01-03 DIAGNOSIS — I63.432 CEREBROVASCULAR ACCIDENT (CVA) DUE TO EMBOLISM OF LEFT POSTERIOR CEREBRAL ARTERY (HCC): ICD-10-CM

## 2025-01-03 DIAGNOSIS — Q21.12 PATENT FORAMEN OVALE: ICD-10-CM

## 2025-01-03 DIAGNOSIS — K80.42 CHOLEDOCHOLITHIASIS WITH ACUTE CHOLECYSTITIS: Primary | ICD-10-CM

## 2025-01-03 DIAGNOSIS — R13.10 DYSPHAGIA, UNSPECIFIED TYPE: ICD-10-CM

## 2025-01-03 LAB
ALBUMIN SERPL-MCNC: 2.7 G/DL (ref 3.4–5)
ALBUMIN/GLOB SERPL: 0.9 {RATIO} (ref 1.1–2.2)
ALP SERPL-CCNC: 93 U/L (ref 40–129)
ALT SERPL-CCNC: 6 U/L (ref 10–40)
ANION GAP SERPL CALCULATED.3IONS-SCNC: 5 MMOL/L (ref 3–16)
AST SERPL-CCNC: 18 U/L (ref 15–37)
BASOPHILS # BLD: 0.1 K/UL (ref 0–0.2)
BASOPHILS NFR BLD: 1.4 %
BILIRUB SERPL-MCNC: <0.2 MG/DL (ref 0–1)
BUN SERPL-MCNC: 15 MG/DL (ref 7–20)
CALCIUM SERPL-MCNC: 8.3 MG/DL (ref 8.3–10.6)
CHLORIDE SERPL-SCNC: 95 MMOL/L (ref 99–110)
CO2 SERPL-SCNC: 39 MMOL/L (ref 21–32)
CREAT SERPL-MCNC: 1.2 MG/DL (ref 0.6–1.2)
DEPRECATED RDW RBC AUTO: 15.4 % (ref 12.4–15.4)
EOSINOPHIL # BLD: 0.1 K/UL (ref 0–0.6)
EOSINOPHIL NFR BLD: 2 %
GFR SERPLBLD CREATININE-BSD FMLA CKD-EPI: 45 ML/MIN/{1.73_M2}
GLUCOSE SERPL-MCNC: 99 MG/DL (ref 70–99)
HCT VFR BLD AUTO: 28 % (ref 36–48)
HGB BLD-MCNC: 8.9 G/DL (ref 12–16)
LIPASE SERPL-CCNC: 44 U/L (ref 13–60)
LYMPHOCYTES # BLD: 1.4 K/UL (ref 1–5.1)
LYMPHOCYTES NFR BLD: 20.5 %
MCH RBC QN AUTO: 29.7 PG (ref 26–34)
MCHC RBC AUTO-ENTMCNC: 31.8 G/DL (ref 31–36)
MCV RBC AUTO: 93.4 FL (ref 80–100)
MONOCYTES # BLD: 0.2 K/UL (ref 0–1.3)
MONOCYTES NFR BLD: 3.6 %
NEUTROPHILS # BLD: 4.9 K/UL (ref 1.7–7.7)
NEUTROPHILS NFR BLD: 72.5 %
PLATELET # BLD AUTO: 343 K/UL (ref 135–450)
PMV BLD AUTO: 7.5 FL (ref 5–10.5)
POTASSIUM SERPL-SCNC: 4.8 MMOL/L (ref 3.5–5.1)
PROT SERPL-MCNC: 5.7 G/DL (ref 6.4–8.2)
RBC # BLD AUTO: 3 M/UL (ref 4–5.2)
SODIUM SERPL-SCNC: 139 MMOL/L (ref 136–145)
WBC # BLD AUTO: 6.7 K/UL (ref 4–11)

## 2025-01-03 PROCEDURE — 74177 CT ABD & PELVIS W/CONTRAST: CPT

## 2025-01-03 PROCEDURE — 80053 COMPREHEN METABOLIC PANEL: CPT

## 2025-01-03 PROCEDURE — 99285 EMERGENCY DEPT VISIT HI MDM: CPT

## 2025-01-03 PROCEDURE — 6360000002 HC RX W HCPCS: Performed by: NURSE PRACTITIONER

## 2025-01-03 PROCEDURE — 6360000004 HC RX CONTRAST MEDICATION: Performed by: NURSE PRACTITIONER

## 2025-01-03 PROCEDURE — 6370000000 HC RX 637 (ALT 250 FOR IP): Performed by: NURSE PRACTITIONER

## 2025-01-03 PROCEDURE — 96365 THER/PROPH/DIAG IV INF INIT: CPT

## 2025-01-03 PROCEDURE — 83690 ASSAY OF LIPASE: CPT

## 2025-01-03 PROCEDURE — 2580000003 HC RX 258: Performed by: NURSE PRACTITIONER

## 2025-01-03 PROCEDURE — 85025 COMPLETE CBC W/AUTO DIFF WBC: CPT

## 2025-01-03 RX ORDER — IOPAMIDOL 755 MG/ML
75 INJECTION, SOLUTION INTRAVASCULAR
Status: COMPLETED | OUTPATIENT
Start: 2025-01-03 | End: 2025-01-03

## 2025-01-03 RX ORDER — HYDROXYZINE HYDROCHLORIDE 25 MG/1
25 TABLET, FILM COATED ORAL ONCE
Status: COMPLETED | OUTPATIENT
Start: 2025-01-03 | End: 2025-01-03

## 2025-01-03 RX ORDER — SODIUM CHLORIDE 9 MG/ML
1000 INJECTION, SOLUTION INTRAVENOUS CONTINUOUS
Status: DISCONTINUED | OUTPATIENT
Start: 2025-01-03 | End: 2025-01-04 | Stop reason: ALTCHOICE

## 2025-01-03 RX ADMIN — SODIUM CHLORIDE 1000 ML: 9 INJECTION, SOLUTION INTRAVENOUS at 23:53

## 2025-01-03 RX ADMIN — HYDROXYZINE HYDROCHLORIDE 25 MG: 25 TABLET ORAL at 22:04

## 2025-01-03 RX ADMIN — PIPERACILLIN AND TAZOBACTAM 3375 MG: 3; .375 INJECTION, POWDER, LYOPHILIZED, FOR SOLUTION INTRAVENOUS at 23:55

## 2025-01-03 RX ADMIN — IOPAMIDOL 75 ML: 755 INJECTION, SOLUTION INTRAVENOUS at 20:59

## 2025-01-04 PROBLEM — E87.5 HYPERKALEMIA: Status: ACTIVE | Noted: 2025-01-04

## 2025-01-04 PROBLEM — K80.42 CHOLEDOCHOLITHIASIS WITH ACUTE CHOLECYSTITIS: Status: ACTIVE | Noted: 2025-01-04

## 2025-01-04 PROBLEM — K81.9 CHOLECYSTITIS: Status: ACTIVE | Noted: 2025-01-04

## 2025-01-04 PROBLEM — R11.2 NAUSEA AND VOMITING: Status: ACTIVE | Noted: 2025-01-04

## 2025-01-04 LAB
ALBUMIN SERPL-MCNC: 2.6 G/DL (ref 3.4–5)
ALBUMIN SERPL-MCNC: 2.6 G/DL (ref 3.4–5)
ALBUMIN/GLOB SERPL: 0.8 {RATIO} (ref 1.1–2.2)
ALP SERPL-CCNC: 101 U/L (ref 40–129)
ALP SERPL-CCNC: 95 U/L (ref 40–129)
ALT SERPL-CCNC: 11 U/L (ref 10–40)
ALT SERPL-CCNC: 6 U/L (ref 10–40)
ANION GAP SERPL CALCULATED.3IONS-SCNC: 8 MMOL/L (ref 3–16)
AST SERPL-CCNC: 22 U/L (ref 15–37)
AST SERPL-CCNC: 23 U/L (ref 15–37)
BILIRUB DIRECT SERPL-MCNC: <0.1 MG/DL (ref 0–0.3)
BILIRUB INDIRECT SERPL-MCNC: ABNORMAL MG/DL (ref 0–1)
BILIRUB SERPL-MCNC: <0.2 MG/DL (ref 0–1)
BILIRUB SERPL-MCNC: <0.2 MG/DL (ref 0–1)
BILIRUB UR QL STRIP.AUTO: NEGATIVE
BUN SERPL-MCNC: 13 MG/DL (ref 7–20)
CALCIUM SERPL-MCNC: 8.9 MG/DL (ref 8.3–10.6)
CHLORIDE SERPL-SCNC: 99 MMOL/L (ref 99–110)
CLARITY UR: CLEAR
CO2 SERPL-SCNC: 37 MMOL/L (ref 21–32)
COLOR UR: YELLOW
CREAT SERPL-MCNC: 1.2 MG/DL (ref 0.6–1.2)
DEPRECATED RDW RBC AUTO: 15.5 % (ref 12.4–15.4)
EPI CELLS #/AREA URNS HPF: NORMAL /HPF (ref 0–5)
FLUAV RNA RESP QL NAA+PROBE: NOT DETECTED
FLUBV RNA RESP QL NAA+PROBE: NOT DETECTED
GFR SERPLBLD CREATININE-BSD FMLA CKD-EPI: 45 ML/MIN/{1.73_M2}
GLUCOSE BLD-MCNC: 114 MG/DL (ref 70–99)
GLUCOSE BLD-MCNC: 139 MG/DL (ref 70–99)
GLUCOSE SERPL-MCNC: 113 MG/DL (ref 70–99)
GLUCOSE UR STRIP.AUTO-MCNC: NEGATIVE MG/DL
HCT VFR BLD AUTO: 30.8 % (ref 36–48)
HGB BLD-MCNC: 9.6 G/DL (ref 12–16)
HGB UR QL STRIP.AUTO: NEGATIVE
KETONES UR STRIP.AUTO-MCNC: NEGATIVE MG/DL
LEUKOCYTE ESTERASE UR QL STRIP.AUTO: ABNORMAL
MCH RBC QN AUTO: 29.8 PG (ref 26–34)
MCHC RBC AUTO-ENTMCNC: 31.1 G/DL (ref 31–36)
MCV RBC AUTO: 95.9 FL (ref 80–100)
NITRITE UR QL STRIP.AUTO: NEGATIVE
PERFORMED ON: ABNORMAL
PERFORMED ON: ABNORMAL
PH UR STRIP.AUTO: 7 [PH] (ref 5–8)
PLATELET # BLD AUTO: 369 K/UL (ref 135–450)
PMV BLD AUTO: 7.3 FL (ref 5–10.5)
POTASSIUM SERPL-SCNC: 5.7 MMOL/L (ref 3.5–5.1)
PROT SERPL-MCNC: 6 G/DL (ref 6.4–8.2)
PROT SERPL-MCNC: 6.5 G/DL (ref 6.4–8.2)
PROT UR STRIP.AUTO-MCNC: NEGATIVE MG/DL
RBC # BLD AUTO: 3.22 M/UL (ref 4–5.2)
RBC #/AREA URNS HPF: NORMAL /HPF (ref 0–4)
SARS-COV-2 RNA RESP QL NAA+PROBE: NOT DETECTED
SODIUM SERPL-SCNC: 144 MMOL/L (ref 136–145)
SP GR UR STRIP.AUTO: 1.01 (ref 1–1.03)
UA COMPLETE W REFLEX CULTURE PNL UR: ABNORMAL
UA DIPSTICK W REFLEX MICRO PNL UR: YES
URN SPEC COLLECT METH UR: ABNORMAL
UROBILINOGEN UR STRIP-ACNC: 0.2 E.U./DL
WBC # BLD AUTO: 9.4 K/UL (ref 4–11)
WBC #/AREA URNS HPF: NORMAL /HPF (ref 0–5)

## 2025-01-04 PROCEDURE — 99222 1ST HOSP IP/OBS MODERATE 55: CPT | Performed by: SURGERY

## 2025-01-04 PROCEDURE — 97166 OT EVAL MOD COMPLEX 45 MIN: CPT

## 2025-01-04 PROCEDURE — 6370000000 HC RX 637 (ALT 250 FOR IP): Performed by: INTERNAL MEDICINE

## 2025-01-04 PROCEDURE — 2700000000 HC OXYGEN THERAPY PER DAY

## 2025-01-04 PROCEDURE — 6370000000 HC RX 637 (ALT 250 FOR IP): Performed by: SURGERY

## 2025-01-04 PROCEDURE — 6360000002 HC RX W HCPCS: Performed by: SURGERY

## 2025-01-04 PROCEDURE — 6360000002 HC RX W HCPCS: Performed by: NURSE PRACTITIONER

## 2025-01-04 PROCEDURE — 36415 COLL VENOUS BLD VENIPUNCTURE: CPT

## 2025-01-04 PROCEDURE — 6360000002 HC RX W HCPCS: Performed by: STUDENT IN AN ORGANIZED HEALTH CARE EDUCATION/TRAINING PROGRAM

## 2025-01-04 PROCEDURE — 85027 COMPLETE CBC AUTOMATED: CPT

## 2025-01-04 PROCEDURE — 80053 COMPREHEN METABOLIC PANEL: CPT

## 2025-01-04 PROCEDURE — 94640 AIRWAY INHALATION TREATMENT: CPT

## 2025-01-04 PROCEDURE — 97530 THERAPEUTIC ACTIVITIES: CPT

## 2025-01-04 PROCEDURE — 1200000000 HC SEMI PRIVATE

## 2025-01-04 PROCEDURE — 5A09557 ASSISTANCE WITH RESPIRATORY VENTILATION, GREATER THAN 96 CONSECUTIVE HOURS, CONTINUOUS POSITIVE AIRWAY PRESSURE: ICD-10-PCS | Performed by: INTERNAL MEDICINE

## 2025-01-04 PROCEDURE — 97162 PT EVAL MOD COMPLEX 30 MIN: CPT

## 2025-01-04 PROCEDURE — 99223 1ST HOSP IP/OBS HIGH 75: CPT | Performed by: NURSE PRACTITIONER

## 2025-01-04 PROCEDURE — 6370000000 HC RX 637 (ALT 250 FOR IP): Performed by: NURSE PRACTITIONER

## 2025-01-04 PROCEDURE — 81001 URINALYSIS AUTO W/SCOPE: CPT

## 2025-01-04 PROCEDURE — 97535 SELF CARE MNGMENT TRAINING: CPT

## 2025-01-04 PROCEDURE — 94761 N-INVAS EAR/PLS OXIMETRY MLT: CPT

## 2025-01-04 PROCEDURE — 2580000003 HC RX 258: Performed by: STUDENT IN AN ORGANIZED HEALTH CARE EDUCATION/TRAINING PROGRAM

## 2025-01-04 PROCEDURE — 87636 SARSCOV2 & INF A&B AMP PRB: CPT

## 2025-01-04 PROCEDURE — 94010 BREATHING CAPACITY TEST: CPT

## 2025-01-04 RX ORDER — LANOLIN ALCOHOL/MO/W.PET/CERES
400 CREAM (GRAM) TOPICAL DAILY
COMMUNITY

## 2025-01-04 RX ORDER — BUSPIRONE HYDROCHLORIDE 7.5 MG/1
15 TABLET ORAL 2 TIMES DAILY
Status: DISCONTINUED | OUTPATIENT
Start: 2025-01-04 | End: 2025-01-11

## 2025-01-04 RX ORDER — GUAIFENESIN 600 MG/1
1200 TABLET, EXTENDED RELEASE ORAL 2 TIMES DAILY
COMMUNITY

## 2025-01-04 RX ORDER — ONDANSETRON 2 MG/ML
4 INJECTION INTRAMUSCULAR; INTRAVENOUS EVERY 6 HOURS PRN
Status: DISCONTINUED | OUTPATIENT
Start: 2025-01-04 | End: 2025-01-27 | Stop reason: HOSPADM

## 2025-01-04 RX ORDER — LORAZEPAM 0.5 MG/1
0.5 TABLET ORAL EVERY 6 HOURS PRN
Status: DISCONTINUED | OUTPATIENT
Start: 2025-01-04 | End: 2025-01-10

## 2025-01-04 RX ORDER — ACETAMINOPHEN 325 MG/1
650 TABLET ORAL EVERY 6 HOURS PRN
Status: DISCONTINUED | OUTPATIENT
Start: 2025-01-04 | End: 2025-01-25 | Stop reason: DRUGHIGH

## 2025-01-04 RX ORDER — IPRATROPIUM BROMIDE AND ALBUTEROL SULFATE 2.5; .5 MG/3ML; MG/3ML
1 SOLUTION RESPIRATORY (INHALATION) 3 TIMES DAILY
Status: DISCONTINUED | OUTPATIENT
Start: 2025-01-04 | End: 2025-01-04

## 2025-01-04 RX ORDER — FLUTICASONE PROPIONATE 50 MCG
1 SPRAY, SUSPENSION (ML) NASAL DAILY PRN
Status: DISCONTINUED | OUTPATIENT
Start: 2025-01-04 | End: 2025-01-27 | Stop reason: HOSPADM

## 2025-01-04 RX ORDER — IPRATROPIUM BROMIDE AND ALBUTEROL SULFATE 2.5; .5 MG/3ML; MG/3ML
1 SOLUTION RESPIRATORY (INHALATION)
Status: DISCONTINUED | OUTPATIENT
Start: 2025-01-04 | End: 2025-01-04

## 2025-01-04 RX ORDER — PANTOPRAZOLE SODIUM 40 MG/1
40 TABLET, DELAYED RELEASE ORAL 2 TIMES DAILY
Status: DISCONTINUED | OUTPATIENT
Start: 2025-01-04 | End: 2025-01-13

## 2025-01-04 RX ORDER — IPRATROPIUM BROMIDE AND ALBUTEROL SULFATE 2.5; .5 MG/3ML; MG/3ML
1 SOLUTION RESPIRATORY (INHALATION)
Status: DISCONTINUED | OUTPATIENT
Start: 2025-01-04 | End: 2025-01-05

## 2025-01-04 RX ORDER — BISACODYL 10 MG
10 SUPPOSITORY, RECTAL RECTAL DAILY PRN
COMMUNITY

## 2025-01-04 RX ORDER — LEVOTHYROXINE SODIUM 25 UG/1
25 TABLET ORAL DAILY
Status: DISCONTINUED | OUTPATIENT
Start: 2025-01-04 | End: 2025-01-27

## 2025-01-04 RX ORDER — SODIUM CHLORIDE 9 MG/ML
INJECTION, SOLUTION INTRAVENOUS CONTINUOUS
Status: ACTIVE | OUTPATIENT
Start: 2025-01-04 | End: 2025-01-05

## 2025-01-04 RX ORDER — GABAPENTIN 100 MG/1
100 CAPSULE ORAL 3 TIMES DAILY
Status: DISCONTINUED | OUTPATIENT
Start: 2025-01-04 | End: 2025-01-27

## 2025-01-04 RX ORDER — ACETAMINOPHEN 650 MG/1
650 SUPPOSITORY RECTAL EVERY 6 HOURS PRN
Status: DISCONTINUED | OUTPATIENT
Start: 2025-01-04 | End: 2025-01-27 | Stop reason: HOSPADM

## 2025-01-04 RX ORDER — ONDANSETRON 4 MG/1
4 TABLET, ORALLY DISINTEGRATING ORAL EVERY 8 HOURS PRN
Status: DISCONTINUED | OUTPATIENT
Start: 2025-01-04 | End: 2025-01-27 | Stop reason: HOSPADM

## 2025-01-04 RX ORDER — BUSPIRONE HYDROCHLORIDE 15 MG/1
15 TABLET ORAL 2 TIMES DAILY
COMMUNITY

## 2025-01-04 RX ORDER — METOPROLOL TARTRATE 25 MG/1
12.5 TABLET, FILM COATED ORAL 2 TIMES DAILY
Status: DISCONTINUED | OUTPATIENT
Start: 2025-01-04 | End: 2025-01-27

## 2025-01-04 RX ORDER — HALOPERIDOL 5 MG/ML
5 INJECTION INTRAMUSCULAR EVERY 6 HOURS PRN
Status: DISCONTINUED | OUTPATIENT
Start: 2025-01-04 | End: 2025-01-10

## 2025-01-04 RX ORDER — ATORVASTATIN CALCIUM 10 MG/1
20 TABLET, FILM COATED ORAL NIGHTLY
Status: DISCONTINUED | OUTPATIENT
Start: 2025-01-04 | End: 2025-01-27

## 2025-01-04 RX ORDER — LANOLIN ALCOHOL/MO/W.PET/CERES
400 CREAM (GRAM) TOPICAL DAILY
Status: DISCONTINUED | OUTPATIENT
Start: 2025-01-04 | End: 2025-01-27

## 2025-01-04 RX ORDER — SODIUM CHLORIDE 9 MG/ML
INJECTION, SOLUTION INTRAVENOUS PRN
Status: DISCONTINUED | OUTPATIENT
Start: 2025-01-04 | End: 2025-01-27 | Stop reason: HOSPADM

## 2025-01-04 RX ORDER — POTASSIUM CHLORIDE 7.45 MG/ML
10 INJECTION INTRAVENOUS PRN
Status: ACTIVE | OUTPATIENT
Start: 2025-01-04 | End: 2025-01-07

## 2025-01-04 RX ORDER — SODIUM CHLORIDE 0.9 % (FLUSH) 0.9 %
5-40 SYRINGE (ML) INJECTION EVERY 12 HOURS SCHEDULED
Status: DISCONTINUED | OUTPATIENT
Start: 2025-01-04 | End: 2025-01-27 | Stop reason: HOSPADM

## 2025-01-04 RX ORDER — DOCUSATE SODIUM 100 MG/1
100 CAPSULE, LIQUID FILLED ORAL DAILY
Status: DISCONTINUED | OUTPATIENT
Start: 2025-01-04 | End: 2025-01-27

## 2025-01-04 RX ORDER — SODIUM CHLORIDE 0.9 % (FLUSH) 0.9 %
5-40 SYRINGE (ML) INJECTION PRN
Status: DISCONTINUED | OUTPATIENT
Start: 2025-01-04 | End: 2025-01-27 | Stop reason: HOSPADM

## 2025-01-04 RX ORDER — BUDESONIDE 0.5 MG/2ML
0.5 INHALANT ORAL
Status: DISCONTINUED | OUTPATIENT
Start: 2025-01-04 | End: 2025-01-27

## 2025-01-04 RX ORDER — BUSPIRONE HYDROCHLORIDE 10 MG/1
10 TABLET ORAL EVERY 6 HOURS PRN
Status: DISCONTINUED | OUTPATIENT
Start: 2025-01-04 | End: 2025-01-10

## 2025-01-04 RX ORDER — ALBUTEROL SULFATE 90 UG/1
2 INHALANT RESPIRATORY (INHALATION) EVERY 4 HOURS PRN
Status: DISCONTINUED | OUTPATIENT
Start: 2025-01-04 | End: 2025-01-27 | Stop reason: HOSPADM

## 2025-01-04 RX ORDER — BISACODYL 5 MG/1
10 TABLET, DELAYED RELEASE ORAL DAILY PRN
Status: ON HOLD | COMMUNITY
End: 2025-01-08 | Stop reason: HOSPADM

## 2025-01-04 RX ORDER — POLYETHYLENE GLYCOL 3350 17 G/17G
17 POWDER, FOR SOLUTION ORAL NIGHTLY
Status: ON HOLD | COMMUNITY
End: 2025-01-08 | Stop reason: HOSPADM

## 2025-01-04 RX ORDER — ESCITALOPRAM OXALATE 10 MG/1
10 TABLET ORAL DAILY
Status: DISCONTINUED | OUTPATIENT
Start: 2025-01-04 | End: 2025-01-27

## 2025-01-04 RX ORDER — FERROUS SULFATE 325(65) MG
325 TABLET ORAL
Status: DISCONTINUED | OUTPATIENT
Start: 2025-01-05 | End: 2025-01-05

## 2025-01-04 RX ORDER — MECLIZINE HYDROCHLORIDE 25 MG/1
12.5 TABLET ORAL 3 TIMES DAILY PRN
Status: DISCONTINUED | OUTPATIENT
Start: 2025-01-04 | End: 2025-01-10

## 2025-01-04 RX ORDER — GUAIFENESIN 600 MG/1
1200 TABLET, EXTENDED RELEASE ORAL 2 TIMES DAILY
Status: DISCONTINUED | OUTPATIENT
Start: 2025-01-04 | End: 2025-01-05

## 2025-01-04 RX ORDER — FUROSEMIDE 20 MG/1
20 TABLET ORAL DAILY
Status: DISCONTINUED | OUTPATIENT
Start: 2025-01-04 | End: 2025-01-11

## 2025-01-04 RX ORDER — GUAIFENESIN 200 MG/10ML
200 LIQUID ORAL EVERY 6 HOURS PRN
COMMUNITY

## 2025-01-04 RX ORDER — GABAPENTIN 100 MG/1
100 CAPSULE ORAL 3 TIMES DAILY
COMMUNITY

## 2025-01-04 RX ORDER — CODEINE PHOSPHATE AND GUAIFENESIN 10; 100 MG/5ML; MG/5ML
10 SOLUTION ORAL EVERY 6 HOURS PRN
COMMUNITY
End: 2025-01-04

## 2025-01-04 RX ORDER — MAGNESIUM SULFATE IN WATER 40 MG/ML
2000 INJECTION, SOLUTION INTRAVENOUS PRN
Status: DISCONTINUED | OUTPATIENT
Start: 2025-01-04 | End: 2025-01-27 | Stop reason: HOSPADM

## 2025-01-04 RX ORDER — ALBUTEROL SULFATE 0.83 MG/ML
2.5 SOLUTION RESPIRATORY (INHALATION) EVERY 4 HOURS PRN
Status: DISCONTINUED | OUTPATIENT
Start: 2025-01-04 | End: 2025-01-27 | Stop reason: HOSPADM

## 2025-01-04 RX ORDER — MIDODRINE HYDROCHLORIDE 5 MG/1
5 TABLET ORAL 2 TIMES DAILY PRN
Status: DISCONTINUED | OUTPATIENT
Start: 2025-01-04 | End: 2025-01-27 | Stop reason: HOSPADM

## 2025-01-04 RX ORDER — ROFLUMILAST 500 UG/1
500 TABLET ORAL DAILY
Status: DISCONTINUED | OUTPATIENT
Start: 2025-01-04 | End: 2025-01-12

## 2025-01-04 RX ORDER — POLYETHYLENE GLYCOL 3350 17 G/17G
17 POWDER, FOR SOLUTION ORAL DAILY PRN
Status: DISCONTINUED | OUTPATIENT
Start: 2025-01-04 | End: 2025-01-27 | Stop reason: HOSPADM

## 2025-01-04 RX ORDER — POTASSIUM CHLORIDE 1500 MG/1
40 TABLET, EXTENDED RELEASE ORAL PRN
Status: ACTIVE | OUTPATIENT
Start: 2025-01-04 | End: 2025-01-07

## 2025-01-04 RX ADMIN — PANTOPRAZOLE SODIUM 40 MG: 40 TABLET, DELAYED RELEASE ORAL at 15:29

## 2025-01-04 RX ADMIN — PIPERACILLIN AND TAZOBACTAM 3375 MG: 3; .375 INJECTION, POWDER, LYOPHILIZED, FOR SOLUTION INTRAVENOUS at 05:16

## 2025-01-04 RX ADMIN — SODIUM CHLORIDE: 9 INJECTION, SOLUTION INTRAVENOUS at 04:01

## 2025-01-04 RX ADMIN — BUSPIRONE HYDROCHLORIDE 15 MG: 7.5 TABLET ORAL at 15:29

## 2025-01-04 RX ADMIN — GABAPENTIN 100 MG: 100 CAPSULE ORAL at 15:29

## 2025-01-04 RX ADMIN — SODIUM ZIRCONIUM CYCLOSILICATE 10 G: 10 POWDER, FOR SUSPENSION ORAL at 08:17

## 2025-01-04 RX ADMIN — ALBUTEROL SULFATE 2.5 MG: 2.5 SOLUTION RESPIRATORY (INHALATION) at 12:46

## 2025-01-04 RX ADMIN — HALOPERIDOL LACTATE 5 MG: 5 INJECTION, SOLUTION INTRAMUSCULAR at 02:38

## 2025-01-04 RX ADMIN — PIPERACILLIN AND TAZOBACTAM 3375 MG: 3; .375 INJECTION, POWDER, LYOPHILIZED, FOR SOLUTION INTRAVENOUS at 13:38

## 2025-01-04 RX ADMIN — BUDESONIDE 500 MCG: 0.5 INHALANT RESPIRATORY (INHALATION) at 20:05

## 2025-01-04 RX ADMIN — HALOPERIDOL LACTATE 5 MG: 5 INJECTION, SOLUTION INTRAMUSCULAR at 12:43

## 2025-01-04 RX ADMIN — IPRATROPIUM BROMIDE AND ALBUTEROL SULFATE 1 DOSE: 2.5; .5 SOLUTION RESPIRATORY (INHALATION) at 07:41

## 2025-01-04 RX ADMIN — ONDANSETRON 4 MG: 2 INJECTION INTRAMUSCULAR; INTRAVENOUS at 08:23

## 2025-01-04 RX ADMIN — LEVOTHYROXINE SODIUM 25 MCG: 25 TABLET ORAL at 15:29

## 2025-01-04 RX ADMIN — METOPROLOL TARTRATE 12.5 MG: 25 TABLET, FILM COATED ORAL at 15:29

## 2025-01-04 RX ADMIN — ROFLUMILAST 500 MCG: 500 TABLET ORAL at 15:29

## 2025-01-04 RX ADMIN — ESCITALOPRAM OXALATE 10 MG: 10 TABLET ORAL at 15:29

## 2025-01-04 RX ADMIN — GUAIFENESIN 1200 MG: 600 TABLET, EXTENDED RELEASE ORAL at 15:29

## 2025-01-04 RX ADMIN — IPRATROPIUM BROMIDE AND ALBUTEROL SULFATE 1 DOSE: 2.5; .5 SOLUTION RESPIRATORY (INHALATION) at 20:05

## 2025-01-04 RX ADMIN — DOCUSATE SODIUM 100 MG: 100 CAPSULE, LIQUID FILLED ORAL at 15:47

## 2025-01-04 RX ADMIN — HYDROMORPHONE HYDROCHLORIDE 0.5 MG: 0.5 INJECTION, SOLUTION INTRAMUSCULAR; INTRAVENOUS; SUBCUTANEOUS at 08:17

## 2025-01-04 ASSESSMENT — COPD QUESTIONNAIRES
QUESTION7_SLEEPQUALITY: 3
QUESTION8_ENERGYLEVEL: 5
QUESTION5_HOMEACTIVITIES: 5
QUESTION1_COUGHFREQUENCY: 2
QUESTION3_CHESTTIGHTNESS: 1
CAT_TOTALSCORE: 26
QUESTION4_WALKINCLINE: 5
QUESTION2_CHESTPHLEGM: 1
QUESTION6_LEAVINGHOUSE: 4

## 2025-01-04 ASSESSMENT — PAIN DESCRIPTION - LOCATION: LOCATION: GENERALIZED

## 2025-01-04 ASSESSMENT — PAIN SCALES - GENERAL
PAINLEVEL_OUTOF10: 0
PAINLEVEL_OUTOF10: 6
PAINLEVEL_OUTOF10: 0
PAINLEVEL_OUTOF10: 0

## 2025-01-04 ASSESSMENT — PAIN SCALES - WONG BAKER: WONGBAKER_NUMERICALRESPONSE: NO HURT

## 2025-01-04 ASSESSMENT — PAIN - FUNCTIONAL ASSESSMENT: PAIN_FUNCTIONAL_ASSESSMENT: PREVENTS OR INTERFERES SOME ACTIVE ACTIVITIES AND ADLS

## 2025-01-04 NOTE — ED PROVIDER NOTES
[address] MEDICAL-SURGICAL  EMERGENCY DEPARTMENT ENCOUNTER        Pt Name: Terri Smith  MRN: [id number]  Birthdate 1940  Date of evaluation: 1/3/2025  Provider: CLIFTON Israel - JOSE MIGUEL  PCP: Jackson Ontiveros MD  Note Started: 7:23 PM EST 1/3/25      KACI. I have evaluated this patient.        CHIEF COMPLAINT       Chief Complaint   Patient presents with    Tailbone Pain     Back pain after falling several months ago.    Nausea     Feels nauseated and unable to keep anything down.        HISTORY OF PRESENT ILLNESS: 1 or more Elements     History From: Patient    Limitations to history : None    Social Determinants Significantly Affecting Health : None    Chief Complaint: Tailbone pain and nausea    Terri Smith is a 84 y.o. female who presents to the emergency department today with symptoms of tailbone pain and nausea.  Patient reports symptoms of tenderness involving the tailbone since about a month ago when she had fallen.  States that she had been in a hospital setting few times since that event and related and telling by about her pain and she did not really have any concerns for injury.  States that she has noted some discomfort.  Today she called EMS to be evaluated as her tailbone pain continued to worsen.  States that she really has pain located in her left flank and into the left lower quadrant of her abdomen.  She also reports symptoms of nausea.  States she has had an episode of vomiting.  No upper back pain or neck pain.  No headache.  No recent fall.    Nursing Notes were all reviewed and agreed with or any disagreements were addressed in the HPI.    REVIEW OF SYSTEMS :      Review of Systems    Positives and Pertinent negatives as per HPI.     SURGICAL HISTORY     Past Surgical History:   Procedure Laterality Date    BRONCHOSCOPY  2019    Dr. Wheatley - w/BAL    CARDIAC CATHETERIZATION  2019    Dr. Palomares     SECTION      COLONOSCOPY N/A 2020

## 2025-01-04 NOTE — FLOWSHEET NOTE
01/04/25 0702   Vital Signs   Temp 97.6 °F (36.4 °C)   Temp Source Axillary   Pulse 96   Heart Rate Source Monitor   Respirations 19   BP (!) 147/71   MAP (Calculated) 96   BP Location Right upper arm   BP Method Automatic   Patient Position Semi fowlers   Pain Assessment   Pain Assessment None - Denies Pain   Pain Level 0   Oxygen Therapy   SpO2 95 %   O2 Device Nasal cannula   O2 Flow Rate (L/min) 3 L/min     AM assessment completed.  No signs or symptoms of distress noted. Patient tolerated AM medications well. Respirations easy and even. Bed in lowest position, bed alarm in place and functioning properly, bed rails x2 up,  Call light within reach.     Bedside Mobility Assessment Tool (BMAT):     Assessment Level 1- Sit and Shake    1. From a semi-reclined position, ask patient to sit up and rotate to a seated position at the side of the bed. Can use the bedrail.    2. Ask patient to reach out and grab your hand and shake making sure patient reaches across his/her midline.   Pass- Patient is able to come to a seated position, maintain core strength. Maintains seated balance while reaching across midline. Move on to Assessment Level 2.     Assessment Level 2- Stretch and Point   1. With patient in seated position at the side of the bed, have patient place both feet on the floor (or stool) with knees no higher than hips.    2. Ask patient to stretch one leg and straighten the knee, then bend the ankle/flex and point the toes. If appropriate, repeat with the other leg.   Fail- Patient is unable to complete task. Patient is MOBILITY LEVEL 2.     Assessment Level 3- Stand   1. Ask patient to elevate off the bed or chair (seated to standing) using an assistive device (cane, bedrail).    2. Patient should be able to raise buttocks off be and hold for a count of five. May repeat once.   Fail- Patient unable to demonstrate standing stability. Patient is MOBILITY LEVEL 3.     Assessment Level 4- Walk   1. Ask patient to

## 2025-01-05 PROBLEM — J96.11 CHRONIC HYPOXEMIC RESPIRATORY FAILURE: Status: ACTIVE | Noted: 2025-01-05

## 2025-01-05 PROBLEM — R91.1 PULMONARY NODULE: Status: ACTIVE | Noted: 2025-01-05

## 2025-01-05 LAB
ALBUMIN SERPL-MCNC: 2.6 G/DL (ref 3.4–5)
ALBUMIN/GLOB SERPL: 0.8 {RATIO} (ref 1.1–2.2)
ALP SERPL-CCNC: 86 U/L (ref 40–129)
ALT SERPL-CCNC: 8 U/L (ref 10–40)
ANION GAP SERPL CALCULATED.3IONS-SCNC: 9 MMOL/L (ref 3–16)
AST SERPL-CCNC: 21 U/L (ref 15–37)
BASOPHILS # BLD: 0 K/UL (ref 0–0.2)
BASOPHILS NFR BLD: 0.2 %
BILIRUB DIRECT SERPL-MCNC: <0.1 MG/DL (ref 0–0.3)
BILIRUB INDIRECT SERPL-MCNC: NORMAL MG/DL (ref 0–1)
BILIRUB SERPL-MCNC: <0.2 MG/DL (ref 0–1)
BUN SERPL-MCNC: 9 MG/DL (ref 7–20)
CALCIUM SERPL-MCNC: 8.7 MG/DL (ref 8.3–10.6)
CHLORIDE SERPL-SCNC: 100 MMOL/L (ref 99–110)
CO2 SERPL-SCNC: 32 MMOL/L (ref 21–32)
CREAT SERPL-MCNC: 1 MG/DL (ref 0.6–1.2)
DEPRECATED RDW RBC AUTO: 15.6 % (ref 12.4–15.4)
EOSINOPHIL # BLD: 0 K/UL (ref 0–0.6)
EOSINOPHIL NFR BLD: 0.3 %
GFR SERPLBLD CREATININE-BSD FMLA CKD-EPI: 56 ML/MIN/{1.73_M2}
GLUCOSE SERPL-MCNC: 83 MG/DL (ref 70–99)
HCT VFR BLD AUTO: 27.3 % (ref 36–48)
HGB BLD-MCNC: 8.5 G/DL (ref 12–16)
LYMPHOCYTES # BLD: 0.9 K/UL (ref 1–5.1)
LYMPHOCYTES NFR BLD: 11.1 %
MCH RBC QN AUTO: 29.6 PG (ref 26–34)
MCHC RBC AUTO-ENTMCNC: 31.2 G/DL (ref 31–36)
MCV RBC AUTO: 94.8 FL (ref 80–100)
MONOCYTES # BLD: 0.4 K/UL (ref 0–1.3)
MONOCYTES NFR BLD: 5.3 %
NEUTROPHILS # BLD: 6.6 K/UL (ref 1.7–7.7)
NEUTROPHILS NFR BLD: 83.1 %
PLATELET # BLD AUTO: 428 K/UL (ref 135–450)
PMV BLD AUTO: 7.2 FL (ref 5–10.5)
POTASSIUM SERPL-SCNC: 4 MMOL/L (ref 3.5–5.1)
PROT SERPL-MCNC: 5.7 G/DL (ref 6.4–8.2)
RBC # BLD AUTO: 2.88 M/UL (ref 4–5.2)
SODIUM SERPL-SCNC: 141 MMOL/L (ref 136–145)
WBC # BLD AUTO: 8 K/UL (ref 4–11)

## 2025-01-05 PROCEDURE — 6370000000 HC RX 637 (ALT 250 FOR IP): Performed by: INTERNAL MEDICINE

## 2025-01-05 PROCEDURE — 99232 SBSQ HOSP IP/OBS MODERATE 35: CPT | Performed by: INTERNAL MEDICINE

## 2025-01-05 PROCEDURE — 6360000002 HC RX W HCPCS: Performed by: STUDENT IN AN ORGANIZED HEALTH CARE EDUCATION/TRAINING PROGRAM

## 2025-01-05 PROCEDURE — 85025 COMPLETE CBC W/AUTO DIFF WBC: CPT

## 2025-01-05 PROCEDURE — 5A0955A ASSISTANCE WITH RESPIRATORY VENTILATION, GREATER THAN 96 CONSECUTIVE HOURS, HIGH NASAL FLOW/VELOCITY: ICD-10-PCS | Performed by: INTERNAL MEDICINE

## 2025-01-05 PROCEDURE — 2500000003 HC RX 250 WO HCPCS: Performed by: STUDENT IN AN ORGANIZED HEALTH CARE EDUCATION/TRAINING PROGRAM

## 2025-01-05 PROCEDURE — 80053 COMPREHEN METABOLIC PANEL: CPT

## 2025-01-05 PROCEDURE — 36415 COLL VENOUS BLD VENIPUNCTURE: CPT

## 2025-01-05 PROCEDURE — 94761 N-INVAS EAR/PLS OXIMETRY MLT: CPT

## 2025-01-05 PROCEDURE — 6370000000 HC RX 637 (ALT 250 FOR IP): Performed by: NURSE PRACTITIONER

## 2025-01-05 PROCEDURE — 1200000000 HC SEMI PRIVATE

## 2025-01-05 PROCEDURE — 99223 1ST HOSP IP/OBS HIGH 75: CPT | Performed by: INTERNAL MEDICINE

## 2025-01-05 PROCEDURE — 6360000002 HC RX W HCPCS: Performed by: NURSE PRACTITIONER

## 2025-01-05 PROCEDURE — 94640 AIRWAY INHALATION TREATMENT: CPT

## 2025-01-05 PROCEDURE — 2700000000 HC OXYGEN THERAPY PER DAY

## 2025-01-05 PROCEDURE — 2580000003 HC RX 258: Performed by: STUDENT IN AN ORGANIZED HEALTH CARE EDUCATION/TRAINING PROGRAM

## 2025-01-05 PROCEDURE — APPSS15 APP SPLIT SHARED TIME 0-15 MINUTES

## 2025-01-05 PROCEDURE — 99232 SBSQ HOSP IP/OBS MODERATE 35: CPT | Performed by: SURGERY

## 2025-01-05 PROCEDURE — 92610 EVALUATE SWALLOWING FUNCTION: CPT

## 2025-01-05 RX ORDER — PREDNISONE 20 MG/1
40 TABLET ORAL DAILY
Status: DISCONTINUED | OUTPATIENT
Start: 2025-01-05 | End: 2025-01-12

## 2025-01-05 RX ORDER — IPRATROPIUM BROMIDE AND ALBUTEROL SULFATE 2.5; .5 MG/3ML; MG/3ML
1 SOLUTION RESPIRATORY (INHALATION)
Status: DISCONTINUED | OUTPATIENT
Start: 2025-01-05 | End: 2025-01-27

## 2025-01-05 RX ORDER — GUAIFENESIN 600 MG/1
600 TABLET, EXTENDED RELEASE ORAL 2 TIMES DAILY
Status: DISCONTINUED | OUTPATIENT
Start: 2025-01-05 | End: 2025-01-13

## 2025-01-05 RX ORDER — IPRATROPIUM BROMIDE AND ALBUTEROL SULFATE 2.5; .5 MG/3ML; MG/3ML
1 SOLUTION RESPIRATORY (INHALATION)
Status: DISCONTINUED | OUTPATIENT
Start: 2025-01-05 | End: 2025-01-05

## 2025-01-05 RX ADMIN — IPRATROPIUM BROMIDE AND ALBUTEROL SULFATE 1 DOSE: 2.5; .5 SOLUTION RESPIRATORY (INHALATION) at 11:10

## 2025-01-05 RX ADMIN — IPRATROPIUM BROMIDE AND ALBUTEROL SULFATE 1 DOSE: 2.5; .5 SOLUTION RESPIRATORY (INHALATION) at 07:06

## 2025-01-05 RX ADMIN — GUAIFENESIN 600 MG: 600 TABLET, EXTENDED RELEASE ORAL at 10:48

## 2025-01-05 RX ADMIN — BUDESONIDE 500 MCG: 0.5 INHALANT RESPIRATORY (INHALATION) at 20:55

## 2025-01-05 RX ADMIN — PIPERACILLIN AND TAZOBACTAM 3375 MG: 3; .375 INJECTION, POWDER, LYOPHILIZED, FOR SOLUTION INTRAVENOUS at 14:03

## 2025-01-05 RX ADMIN — METOPROLOL TARTRATE 12.5 MG: 25 TABLET, FILM COATED ORAL at 21:18

## 2025-01-05 RX ADMIN — GABAPENTIN 100 MG: 100 CAPSULE ORAL at 13:57

## 2025-01-05 RX ADMIN — ESCITALOPRAM OXALATE 10 MG: 10 TABLET ORAL at 10:49

## 2025-01-05 RX ADMIN — LORAZEPAM 0.5 MG: 0.5 TABLET ORAL at 21:19

## 2025-01-05 RX ADMIN — ATORVASTATIN CALCIUM 20 MG: 10 TABLET, FILM COATED ORAL at 21:19

## 2025-01-05 RX ADMIN — PIPERACILLIN AND TAZOBACTAM 3375 MG: 3; .375 INJECTION, POWDER, LYOPHILIZED, FOR SOLUTION INTRAVENOUS at 21:24

## 2025-01-05 RX ADMIN — PIPERACILLIN AND TAZOBACTAM 3375 MG: 3; .375 INJECTION, POWDER, LYOPHILIZED, FOR SOLUTION INTRAVENOUS at 00:03

## 2025-01-05 RX ADMIN — SODIUM CHLORIDE, PRESERVATIVE FREE 10 ML: 5 INJECTION INTRAVENOUS at 21:20

## 2025-01-05 RX ADMIN — BUDESONIDE 500 MCG: 0.5 INHALANT RESPIRATORY (INHALATION) at 07:06

## 2025-01-05 RX ADMIN — GABAPENTIN 100 MG: 100 CAPSULE ORAL at 21:19

## 2025-01-05 RX ADMIN — FLUTICASONE PROPIONATE 1 SPRAY: 50 SPRAY, METERED NASAL at 01:50

## 2025-01-05 RX ADMIN — PANTOPRAZOLE SODIUM 40 MG: 40 TABLET, DELAYED RELEASE ORAL at 21:19

## 2025-01-05 RX ADMIN — GABAPENTIN 100 MG: 100 CAPSULE ORAL at 10:48

## 2025-01-05 RX ADMIN — METOPROLOL TARTRATE 12.5 MG: 25 TABLET, FILM COATED ORAL at 10:48

## 2025-01-05 RX ADMIN — ONDANSETRON 4 MG: 2 INJECTION INTRAMUSCULAR; INTRAVENOUS at 03:47

## 2025-01-05 RX ADMIN — IPRATROPIUM BROMIDE AND ALBUTEROL SULFATE 1 DOSE: 2.5; .5 SOLUTION RESPIRATORY (INHALATION) at 15:00

## 2025-01-05 RX ADMIN — PIPERACILLIN AND TAZOBACTAM 3375 MG: 3; .375 INJECTION, POWDER, LYOPHILIZED, FOR SOLUTION INTRAVENOUS at 05:39

## 2025-01-05 RX ADMIN — LORAZEPAM 0.5 MG: 0.5 TABLET ORAL at 10:48

## 2025-01-05 RX ADMIN — GUAIFENESIN 600 MG: 600 TABLET, EXTENDED RELEASE ORAL at 21:18

## 2025-01-05 RX ADMIN — IPRATROPIUM BROMIDE AND ALBUTEROL SULFATE 1 DOSE: 2.5; .5 SOLUTION RESPIRATORY (INHALATION) at 20:55

## 2025-01-05 RX ADMIN — BUSPIRONE HYDROCHLORIDE 15 MG: 7.5 TABLET ORAL at 21:19

## 2025-01-05 RX ADMIN — PREDNISONE 40 MG: 20 TABLET ORAL at 13:57

## 2025-01-05 RX ADMIN — BUSPIRONE HYDROCHLORIDE 15 MG: 7.5 TABLET ORAL at 10:48

## 2025-01-05 ASSESSMENT — PAIN SCALES - GENERAL
PAINLEVEL_OUTOF10: 0
PAINLEVEL_OUTOF10: 0

## 2025-01-05 NOTE — CARE COORDINATION
01/05/25 1515   Readmission Assessment   Number of Days since last admission? 1-7 days   Previous Disposition SNF   Who is being Interviewed Caregiver   What was the patient's/caregiver's perception as to why they think they needed to return back to the hospital? Other (Comment)  (vomiting)   Did you visit your Primary Care Physician after you left the hospital, before you returned this time? Yes   Why weren't you able to visit your PCP? Other (Comment)  (seen by facility DR)   Did you see a specialist, such as Cardiac, Pulmonary, Orthopedic Physician, etc. after you left the hospital? No   Who advised the patient to return to the hospital? Caregiver   Does the patient report anything that got in the way of taking their medications? No   In our efforts to provide the best possible care to you and others like you, can you think of anything that we could have done to help you after you left the hospital the first time, so that you might not have needed to return so soon? Other (Comment)  (nothing)

## 2025-01-05 NOTE — ACP (ADVANCE CARE PLANNING)
Advance Care Planning   General Advance Care Planning (ACP) Conversation    Date of Conversation: 1/3/2025  Conducted with: Legal next of kin  Other persons present: Daughter Juan    Healthcare Decision Maker:    Primary Decision Maker: Juan Smith - Child - [phone]    Secondary Decision Maker: Hafsa Tapia - Grandchild - [phone]    Secondary Decision Maker: Raphael Smith - Child - [phone]      Content/Action Overview:    Full Code      Chrissy Ayers RN

## 2025-01-05 NOTE — FLOWSHEET NOTE
01/05/25 1030   Vital Signs   Temp 97.5 °F (36.4 °C)   Temp Source Oral   Pulse (!) 114   Heart Rate Source Monitor   Respirations 22   BP (!) 142/64   MAP (Calculated) 90   BP Location Left upper arm   BP Method Automatic   Patient Position High fowlers   Pain Assessment   Pain Assessment None - Denies Pain   Pain Level 0   Opioid-Induced Sedation   POSS Score S   Oxygen Therapy   SpO2 100 %   O2 Device High flow nasal cannula   O2 Flow Rate (L/min) 4 L/min     AM assessment completed. No complaints of pain or discomfort voiced. No signs or symptoms of distress noted. Patient tolerated AM medications well. Respirations easy and even. Bed in lowest position, bed alarm in place and functioning properly, bed rails x2 up,  Call light within reach.     Bedside Mobility Assessment Tool (BMAT):     Assessment Level 1- Sit and Shake    1. From a semi-reclined position, ask patient to sit up and rotate to a seated position at the side of the bed. Can use the bedrail.    2. Ask patient to reach out and grab your hand and shake making sure patient reaches across his/her midline.   Pass- Patient is able to come to a seated position, maintain core strength. Maintains seated balance while reaching across midline. Move on to Assessment Level 2.     Assessment Level 2- Stretch and Point   1. With patient in seated position at the side of the bed, have patient place both feet on the floor (or stool) with knees no higher than hips.    2. Ask patient to stretch one leg and straighten the knee, then bend the ankle/flex and point the toes. If appropriate, repeat with the other leg.   Pass- Patient is able to demonstrate appropriate quad strength on intended weight bearing limb(s). Move onto Assessment Level 3.     Assessment Level 3- Stand   1. Ask patient to elevate off the bed or chair (seated to standing) using an assistive device (cane, bedrail).    2. Patient should be able to raise buttocks off be and hold for a count of five.

## 2025-01-05 NOTE — CARE COORDINATION
Case Management Assessment  Initial Evaluation    Date/Time of Evaluation: 1/5/2025 3:12 PM  Assessment Completed by: Chrissy Ayers RN    If patient is discharged prior to next notation, then this note serves as note for discharge by case management.    Patient Name: Terri Smith                   YOB: 1940  Diagnosis: Choledocholithiasis with acute cholecystitis [K80.42]  Cholecystitis [K81.9]  Dysphagia, unspecified type [R13.10]                   Date / Time: 1/3/2025  7:08 PM    Patient Admission Status: Inpatient   Readmission Risk (Low < 19, Mod (19-27), High > 27): Readmission Risk Score: 40.1    Current PCP: Jackson Ontiveros MD  PCP verified by CM? Yes    Chart Reviewed: Yes      History Provided by: Child/Family  Patient Orientation: Alert and Oriented, Person    Patient Cognition: Severely Impaired    Hospitalization in the last 30 days (Readmission):  Yes    If yes, Readmission Assessment in  Navigator will be completed.    Advance Directives:      Code Status: Full Code   Patient's Primary Decision Maker is: Legal Next of Kin    Primary Decision Maker: LuisArjunsurindergrace - Child - [phone]    Secondary Decision Maker: WillieHafsa - Grandchild - [phone]    Secondary Decision Maker: Raphael Smith - Child - [phone]    Discharge Planning:    Patient lives with: Other (Comment) (CC) Type of Home: Skilled Nursing Facility  Primary Care Giver: Self  Patient Support Systems include: Children, Family Members   Current Financial resources: Medicare  Current community resources: ECF/Home Care  Current services prior to admission: Skilled Nursing Facility            Current DME: Other (Comment) (per facility)            Type of Home Care services:  None    ADLS  Prior functional level: Assistance with the following:, Bathing, Dressing, Toileting, Cooking, Housework, Shopping, Mobility  Current functional level: Assistance with the following:, Bathing, Dressing,

## 2025-01-06 PROBLEM — R13.10 DYSPHAGIA: Status: ACTIVE | Noted: 2025-01-06

## 2025-01-06 LAB
ALBUMIN SERPL-MCNC: 2.5 G/DL (ref 3.4–5)
ALBUMIN/GLOB SERPL: 0.9 {RATIO} (ref 1.1–2.2)
ALP SERPL-CCNC: 74 U/L (ref 40–129)
ALT SERPL-CCNC: <5 U/L (ref 10–40)
ANION GAP SERPL CALCULATED.3IONS-SCNC: 11 MMOL/L (ref 3–16)
ANISOCYTOSIS BLD QL SMEAR: ABNORMAL
AST SERPL-CCNC: 17 U/L (ref 15–37)
BASOPHILS # BLD: 0 K/UL (ref 0–0.2)
BASOPHILS NFR BLD: 0.3 %
BILIRUB SERPL-MCNC: <0.2 MG/DL (ref 0–1)
BUN SERPL-MCNC: 9 MG/DL (ref 7–20)
BURR CELLS BLD QL SMEAR: ABNORMAL
CALCIUM SERPL-MCNC: 8.8 MG/DL (ref 8.3–10.6)
CHLORIDE SERPL-SCNC: 102 MMOL/L (ref 99–110)
CO2 SERPL-SCNC: 29 MMOL/L (ref 21–32)
CREAT SERPL-MCNC: 1 MG/DL (ref 0.6–1.2)
DACRYOCYTES BLD QL SMEAR: ABNORMAL
DEPRECATED RDW RBC AUTO: 16.9 % (ref 12.4–15.4)
EOSINOPHIL # BLD: 0 K/UL (ref 0–0.6)
EOSINOPHIL NFR BLD: 0 %
GFR SERPLBLD CREATININE-BSD FMLA CKD-EPI: 56 ML/MIN/{1.73_M2}
GLUCOSE SERPL-MCNC: 74 MG/DL (ref 70–99)
HCT VFR BLD AUTO: 26.7 % (ref 36–48)
HGB BLD-MCNC: 7.9 G/DL (ref 12–16)
LYMPHOCYTES # BLD: 1.2 K/UL (ref 1–5.1)
LYMPHOCYTES NFR BLD: 19.7 %
MCH RBC QN AUTO: 30.3 PG (ref 26–34)
MCHC RBC AUTO-ENTMCNC: 29.6 G/DL (ref 31–36)
MCV RBC AUTO: 102.3 FL (ref 80–100)
MONOCYTES # BLD: 0.3 K/UL (ref 0–1.3)
MONOCYTES NFR BLD: 4.2 %
NEUTROPHILS # BLD: 4.6 K/UL (ref 1.7–7.7)
NEUTROPHILS NFR BLD: 75.8 %
OVALOCYTES BLD QL SMEAR: ABNORMAL
PLATELET # BLD AUTO: 399 K/UL (ref 135–450)
PLATELET BLD QL SMEAR: ADEQUATE
PMV BLD AUTO: 7.4 FL (ref 5–10.5)
POIKILOCYTOSIS BLD QL SMEAR: ABNORMAL
POTASSIUM SERPL-SCNC: 4.1 MMOL/L (ref 3.5–5.1)
PROT SERPL-MCNC: 5.4 G/DL (ref 6.4–8.2)
RBC # BLD AUTO: 2.61 M/UL (ref 4–5.2)
SCHISTOCYTES BLD QL SMEAR: ABNORMAL
SLIDE REVIEW: ABNORMAL
SODIUM SERPL-SCNC: 142 MMOL/L (ref 136–145)
WBC # BLD AUTO: 6.1 K/UL (ref 4–11)

## 2025-01-06 PROCEDURE — 1200000000 HC SEMI PRIVATE

## 2025-01-06 PROCEDURE — 99232 SBSQ HOSP IP/OBS MODERATE 35: CPT | Performed by: INTERNAL MEDICINE

## 2025-01-06 PROCEDURE — 80053 COMPREHEN METABOLIC PANEL: CPT

## 2025-01-06 PROCEDURE — 6370000000 HC RX 637 (ALT 250 FOR IP): Performed by: INTERNAL MEDICINE

## 2025-01-06 PROCEDURE — 85025 COMPLETE CBC W/AUTO DIFF WBC: CPT

## 2025-01-06 PROCEDURE — 6360000002 HC RX W HCPCS: Performed by: NURSE PRACTITIONER

## 2025-01-06 PROCEDURE — 94640 AIRWAY INHALATION TREATMENT: CPT

## 2025-01-06 PROCEDURE — 2500000003 HC RX 250 WO HCPCS: Performed by: STUDENT IN AN ORGANIZED HEALTH CARE EDUCATION/TRAINING PROGRAM

## 2025-01-06 PROCEDURE — 6360000002 HC RX W HCPCS: Performed by: STUDENT IN AN ORGANIZED HEALTH CARE EDUCATION/TRAINING PROGRAM

## 2025-01-06 PROCEDURE — 2700000000 HC OXYGEN THERAPY PER DAY

## 2025-01-06 PROCEDURE — 2580000003 HC RX 258: Performed by: STUDENT IN AN ORGANIZED HEALTH CARE EDUCATION/TRAINING PROGRAM

## 2025-01-06 PROCEDURE — 94761 N-INVAS EAR/PLS OXIMETRY MLT: CPT

## 2025-01-06 PROCEDURE — 6370000000 HC RX 637 (ALT 250 FOR IP): Performed by: NURSE PRACTITIONER

## 2025-01-06 PROCEDURE — 6370000000 HC RX 637 (ALT 250 FOR IP): Performed by: STUDENT IN AN ORGANIZED HEALTH CARE EDUCATION/TRAINING PROGRAM

## 2025-01-06 PROCEDURE — 94660 CPAP INITIATION&MGMT: CPT

## 2025-01-06 PROCEDURE — 99231 SBSQ HOSP IP/OBS SF/LOW 25: CPT | Performed by: SURGERY

## 2025-01-06 RX ADMIN — BUSPIRONE HYDROCHLORIDE 15 MG: 7.5 TABLET ORAL at 21:26

## 2025-01-06 RX ADMIN — BUSPIRONE HYDROCHLORIDE 15 MG: 7.5 TABLET ORAL at 14:14

## 2025-01-06 RX ADMIN — DOCUSATE SODIUM 100 MG: 100 CAPSULE, LIQUID FILLED ORAL at 14:14

## 2025-01-06 RX ADMIN — IPRATROPIUM BROMIDE AND ALBUTEROL SULFATE 1 DOSE: 2.5; .5 SOLUTION RESPIRATORY (INHALATION) at 10:57

## 2025-01-06 RX ADMIN — PANTOPRAZOLE SODIUM 40 MG: 40 TABLET, DELAYED RELEASE ORAL at 14:13

## 2025-01-06 RX ADMIN — LORAZEPAM 0.5 MG: 0.5 TABLET ORAL at 21:26

## 2025-01-06 RX ADMIN — ESCITALOPRAM OXALATE 10 MG: 10 TABLET ORAL at 14:13

## 2025-01-06 RX ADMIN — ATORVASTATIN CALCIUM 20 MG: 10 TABLET, FILM COATED ORAL at 21:25

## 2025-01-06 RX ADMIN — PIPERACILLIN AND TAZOBACTAM 3375 MG: 3; .375 INJECTION, POWDER, LYOPHILIZED, FOR SOLUTION INTRAVENOUS at 05:52

## 2025-01-06 RX ADMIN — LORAZEPAM 0.5 MG: 0.5 TABLET ORAL at 14:14

## 2025-01-06 RX ADMIN — GABAPENTIN 100 MG: 100 CAPSULE ORAL at 14:14

## 2025-01-06 RX ADMIN — GUAIFENESIN 600 MG: 600 TABLET, EXTENDED RELEASE ORAL at 21:26

## 2025-01-06 RX ADMIN — PIPERACILLIN AND TAZOBACTAM 3375 MG: 3; .375 INJECTION, POWDER, LYOPHILIZED, FOR SOLUTION INTRAVENOUS at 21:34

## 2025-01-06 RX ADMIN — IPRATROPIUM BROMIDE AND ALBUTEROL SULFATE 1 DOSE: 2.5; .5 SOLUTION RESPIRATORY (INHALATION) at 08:00

## 2025-01-06 RX ADMIN — ROFLUMILAST 500 MCG: 500 TABLET ORAL at 14:15

## 2025-01-06 RX ADMIN — SODIUM CHLORIDE, PRESERVATIVE FREE 10 ML: 5 INJECTION INTRAVENOUS at 21:31

## 2025-01-06 RX ADMIN — ONDANSETRON 4 MG: 2 INJECTION INTRAMUSCULAR; INTRAVENOUS at 14:32

## 2025-01-06 RX ADMIN — GUAIFENESIN 600 MG: 600 TABLET, EXTENDED RELEASE ORAL at 14:14

## 2025-01-06 RX ADMIN — METOPROLOL TARTRATE 12.5 MG: 25 TABLET, FILM COATED ORAL at 14:15

## 2025-01-06 RX ADMIN — IPRATROPIUM BROMIDE AND ALBUTEROL SULFATE 1 DOSE: 2.5; .5 SOLUTION RESPIRATORY (INHALATION) at 14:43

## 2025-01-06 RX ADMIN — ACETAMINOPHEN 650 MG: 325 TABLET ORAL at 15:56

## 2025-01-06 RX ADMIN — Medication 400 MG: at 14:14

## 2025-01-06 RX ADMIN — GABAPENTIN 100 MG: 100 CAPSULE ORAL at 21:25

## 2025-01-06 RX ADMIN — BUDESONIDE 500 MCG: 0.5 INHALANT RESPIRATORY (INHALATION) at 21:41

## 2025-01-06 RX ADMIN — SODIUM CHLORIDE, PRESERVATIVE FREE 10 ML: 5 INJECTION INTRAVENOUS at 09:42

## 2025-01-06 RX ADMIN — IPRATROPIUM BROMIDE AND ALBUTEROL SULFATE 1 DOSE: 2.5; .5 SOLUTION RESPIRATORY (INHALATION) at 21:42

## 2025-01-06 RX ADMIN — METOPROLOL TARTRATE 12.5 MG: 25 TABLET, FILM COATED ORAL at 21:25

## 2025-01-06 RX ADMIN — PANTOPRAZOLE SODIUM 40 MG: 40 TABLET, DELAYED RELEASE ORAL at 21:25

## 2025-01-06 RX ADMIN — BUDESONIDE 500 MCG: 0.5 INHALANT RESPIRATORY (INHALATION) at 07:59

## 2025-01-06 RX ADMIN — PREDNISONE 40 MG: 20 TABLET ORAL at 14:14

## 2025-01-06 RX ADMIN — PIPERACILLIN AND TAZOBACTAM 3375 MG: 3; .375 INJECTION, POWDER, LYOPHILIZED, FOR SOLUTION INTRAVENOUS at 14:26

## 2025-01-06 ASSESSMENT — PAIN SCALES - GENERAL
PAINLEVEL_OUTOF10: 0
PAINLEVEL_OUTOF10: 5

## 2025-01-06 ASSESSMENT — PAIN DESCRIPTION - DESCRIPTORS: DESCRIPTORS: ACHING

## 2025-01-06 ASSESSMENT — PAIN DESCRIPTION - ORIENTATION: ORIENTATION: POSTERIOR

## 2025-01-06 ASSESSMENT — PAIN - FUNCTIONAL ASSESSMENT: PAIN_FUNCTIONAL_ASSESSMENT: ACTIVITIES ARE NOT PREVENTED

## 2025-01-06 ASSESSMENT — PAIN DESCRIPTION - LOCATION: LOCATION: BUTTOCKS;HEAD

## 2025-01-07 ENCOUNTER — ANESTHESIA EVENT (OUTPATIENT)
Dept: ENDOSCOPY | Age: 85
End: 2025-01-07
Payer: MEDICARE

## 2025-01-07 ENCOUNTER — APPOINTMENT (OUTPATIENT)
Dept: GENERAL RADIOLOGY | Age: 85
DRG: 444 | End: 2025-01-07
Payer: MEDICARE

## 2025-01-07 ENCOUNTER — ANESTHESIA (OUTPATIENT)
Dept: ENDOSCOPY | Age: 85
End: 2025-01-07
Payer: MEDICARE

## 2025-01-07 ENCOUNTER — TELEPHONE (OUTPATIENT)
Dept: PULMONOLOGY | Age: 85
End: 2025-01-07

## 2025-01-07 LAB
ALBUMIN SERPL-MCNC: 2.7 G/DL (ref 3.4–5)
ALBUMIN/GLOB SERPL: 1.2 {RATIO} (ref 1.1–2.2)
ALP SERPL-CCNC: 68 U/L (ref 40–129)
ALT SERPL-CCNC: 9 U/L (ref 10–40)
ANION GAP SERPL CALCULATED.3IONS-SCNC: 8 MMOL/L (ref 3–16)
AST SERPL-CCNC: 15 U/L (ref 15–37)
BASOPHILS # BLD: 0 K/UL (ref 0–0.2)
BASOPHILS NFR BLD: 0.3 %
BILIRUB DIRECT SERPL-MCNC: <0.1 MG/DL (ref 0–0.3)
BILIRUB INDIRECT SERPL-MCNC: ABNORMAL MG/DL (ref 0–1)
BILIRUB SERPL-MCNC: <0.2 MG/DL (ref 0–1)
BUN SERPL-MCNC: 10 MG/DL (ref 7–20)
CALCIUM SERPL-MCNC: 9 MG/DL (ref 8.3–10.6)
CHLORIDE SERPL-SCNC: 104 MMOL/L (ref 99–110)
CO2 SERPL-SCNC: 34 MMOL/L (ref 21–32)
CREAT SERPL-MCNC: 1.2 MG/DL (ref 0.6–1.2)
DEPRECATED RDW RBC AUTO: 16.2 % (ref 12.4–15.4)
EOSINOPHIL # BLD: 0 K/UL (ref 0–0.6)
EOSINOPHIL NFR BLD: 0.1 %
GFR SERPLBLD CREATININE-BSD FMLA CKD-EPI: 45 ML/MIN/{1.73_M2}
GLUCOSE BLD-MCNC: 92 MG/DL (ref 70–99)
GLUCOSE BLD-MCNC: 98 MG/DL (ref 70–99)
GLUCOSE SERPL-MCNC: 92 MG/DL (ref 70–99)
HCT VFR BLD AUTO: 25.7 % (ref 36–48)
HGB BLD-MCNC: 8 G/DL (ref 12–16)
INR PPP: 1.05 (ref 0.85–1.15)
LYMPHOCYTES # BLD: 0.8 K/UL (ref 1–5.1)
LYMPHOCYTES NFR BLD: 17.9 %
MAGNESIUM SERPL-MCNC: 1.71 MG/DL (ref 1.8–2.4)
MCH RBC QN AUTO: 29.4 PG (ref 26–34)
MCHC RBC AUTO-ENTMCNC: 31 G/DL (ref 31–36)
MCV RBC AUTO: 94.8 FL (ref 80–100)
MONOCYTES # BLD: 0.3 K/UL (ref 0–1.3)
MONOCYTES NFR BLD: 6.8 %
NEUTROPHILS # BLD: 3.4 K/UL (ref 1.7–7.7)
NEUTROPHILS NFR BLD: 74.9 %
PERFORMED ON: NORMAL
PERFORMED ON: NORMAL
PLATELET # BLD AUTO: 398 K/UL (ref 135–450)
PMV BLD AUTO: 7.1 FL (ref 5–10.5)
POTASSIUM SERPL-SCNC: 3.4 MMOL/L (ref 3.5–5.1)
PROT SERPL-MCNC: 5 G/DL (ref 6.4–8.2)
PROTHROMBIN TIME: 13.9 SEC (ref 11.9–14.9)
RBC # BLD AUTO: 2.71 M/UL (ref 4–5.2)
SODIUM SERPL-SCNC: 146 MMOL/L (ref 136–145)
WBC # BLD AUTO: 4.6 K/UL (ref 4–11)

## 2025-01-07 PROCEDURE — BF181ZZ FLUOROSCOPY OF PANCREATIC DUCTS USING LOW OSMOLAR CONTRAST: ICD-10-PCS | Performed by: INTERNAL MEDICINE

## 2025-01-07 PROCEDURE — 0FC98ZZ EXTIRPATION OF MATTER FROM COMMON BILE DUCT, VIA NATURAL OR ARTIFICIAL OPENING ENDOSCOPIC: ICD-10-PCS | Performed by: INTERNAL MEDICINE

## 2025-01-07 PROCEDURE — 83735 ASSAY OF MAGNESIUM: CPT

## 2025-01-07 PROCEDURE — 94660 CPAP INITIATION&MGMT: CPT

## 2025-01-07 PROCEDURE — 76937 US GUIDE VASCULAR ACCESS: CPT

## 2025-01-07 PROCEDURE — 7100000001 HC PACU RECOVERY - ADDTL 15 MIN: Performed by: INTERNAL MEDICINE

## 2025-01-07 PROCEDURE — 3609014900 HC ERCP W/SPHINCTEROTOMY &/OR PAPILLOTOMY: Performed by: INTERNAL MEDICINE

## 2025-01-07 PROCEDURE — 80053 COMPREHEN METABOLIC PANEL: CPT

## 2025-01-07 PROCEDURE — 99232 SBSQ HOSP IP/OBS MODERATE 35: CPT | Performed by: INTERNAL MEDICINE

## 2025-01-07 PROCEDURE — 2500000003 HC RX 250 WO HCPCS: Performed by: NURSE ANESTHETIST, CERTIFIED REGISTERED

## 2025-01-07 PROCEDURE — 6370000000 HC RX 637 (ALT 250 FOR IP): Performed by: INTERNAL MEDICINE

## 2025-01-07 PROCEDURE — 2580000003 HC RX 258: Performed by: NURSE ANESTHETIST, CERTIFIED REGISTERED

## 2025-01-07 PROCEDURE — 94640 AIRWAY INHALATION TREATMENT: CPT

## 2025-01-07 PROCEDURE — 36415 COLL VENOUS BLD VENIPUNCTURE: CPT

## 2025-01-07 PROCEDURE — 3700000001 HC ADD 15 MINUTES (ANESTHESIA): Performed by: INTERNAL MEDICINE

## 2025-01-07 PROCEDURE — 1200000000 HC SEMI PRIVATE

## 2025-01-07 PROCEDURE — 0FC78ZZ EXTIRPATION OF MATTER FROM COMMON HEPATIC DUCT, VIA NATURAL OR ARTIFICIAL OPENING ENDOSCOPIC: ICD-10-PCS | Performed by: INTERNAL MEDICINE

## 2025-01-07 PROCEDURE — 74329 X-RAY FOR PANCREAS ENDOSCOPY: CPT

## 2025-01-07 PROCEDURE — 6360000002 HC RX W HCPCS: Performed by: INTERNAL MEDICINE

## 2025-01-07 PROCEDURE — 94761 N-INVAS EAR/PLS OXIMETRY MLT: CPT

## 2025-01-07 PROCEDURE — 6370000000 HC RX 637 (ALT 250 FOR IP): Performed by: NURSE PRACTITIONER

## 2025-01-07 PROCEDURE — 85025 COMPLETE CBC W/AUTO DIFF WBC: CPT

## 2025-01-07 PROCEDURE — 2709999900 HC NON-CHARGEABLE SUPPLY: Performed by: INTERNAL MEDICINE

## 2025-01-07 PROCEDURE — 2580000003 HC RX 258: Performed by: STUDENT IN AN ORGANIZED HEALTH CARE EDUCATION/TRAINING PROGRAM

## 2025-01-07 PROCEDURE — 99231 SBSQ HOSP IP/OBS SF/LOW 25: CPT | Performed by: SURGERY

## 2025-01-07 PROCEDURE — 85610 PROTHROMBIN TIME: CPT

## 2025-01-07 PROCEDURE — 3609015200 HC ERCP REMOVE CALCULI/DEBRIS BILIARY/PANCREAS DUCT: Performed by: INTERNAL MEDICINE

## 2025-01-07 PROCEDURE — 2500000003 HC RX 250 WO HCPCS: Performed by: STUDENT IN AN ORGANIZED HEALTH CARE EDUCATION/TRAINING PROGRAM

## 2025-01-07 PROCEDURE — 6360000002 HC RX W HCPCS: Performed by: STUDENT IN AN ORGANIZED HEALTH CARE EDUCATION/TRAINING PROGRAM

## 2025-01-07 PROCEDURE — C1748 ENDOSCOPE, SINGLE, UGI: HCPCS | Performed by: INTERNAL MEDICINE

## 2025-01-07 PROCEDURE — 2720000010 HC SURG SUPPLY STERILE: Performed by: INTERNAL MEDICINE

## 2025-01-07 PROCEDURE — 6360000002 HC RX W HCPCS: Performed by: NURSE ANESTHETIST, CERTIFIED REGISTERED

## 2025-01-07 PROCEDURE — 2700000000 HC OXYGEN THERAPY PER DAY

## 2025-01-07 PROCEDURE — 0F798DZ DILATION OF COMMON BILE DUCT WITH INTRALUMINAL DEVICE, VIA NATURAL OR ARTIFICIAL OPENING ENDOSCOPIC: ICD-10-PCS | Performed by: INTERNAL MEDICINE

## 2025-01-07 PROCEDURE — 2580000003 HC RX 258: Performed by: INTERNAL MEDICINE

## 2025-01-07 PROCEDURE — 6360000002 HC RX W HCPCS: Performed by: NURSE PRACTITIONER

## 2025-01-07 PROCEDURE — 7100000000 HC PACU RECOVERY - FIRST 15 MIN: Performed by: INTERNAL MEDICINE

## 2025-01-07 PROCEDURE — 3700000000 HC ANESTHESIA ATTENDED CARE: Performed by: INTERNAL MEDICINE

## 2025-01-07 RX ORDER — DIPHENHYDRAMINE HYDROCHLORIDE 50 MG/ML
12.5 INJECTION INTRAMUSCULAR; INTRAVENOUS
Status: DISCONTINUED | OUTPATIENT
Start: 2025-01-07 | End: 2025-01-07 | Stop reason: HOSPADM

## 2025-01-07 RX ORDER — SODIUM CHLORIDE 9 MG/ML
INJECTION, SOLUTION INTRAVENOUS CONTINUOUS
Status: DISCONTINUED | OUTPATIENT
Start: 2025-01-07 | End: 2025-01-10

## 2025-01-07 RX ORDER — NALOXONE HYDROCHLORIDE 0.4 MG/ML
INJECTION, SOLUTION INTRAMUSCULAR; INTRAVENOUS; SUBCUTANEOUS PRN
Status: DISCONTINUED | OUTPATIENT
Start: 2025-01-07 | End: 2025-01-07 | Stop reason: HOSPADM

## 2025-01-07 RX ORDER — ONDANSETRON 2 MG/ML
4 INJECTION INTRAMUSCULAR; INTRAVENOUS
Status: DISCONTINUED | OUTPATIENT
Start: 2025-01-07 | End: 2025-01-07 | Stop reason: HOSPADM

## 2025-01-07 RX ORDER — SODIUM CHLORIDE 0.9 % (FLUSH) 0.9 %
5-40 SYRINGE (ML) INJECTION EVERY 12 HOURS SCHEDULED
Status: DISCONTINUED | OUTPATIENT
Start: 2025-01-07 | End: 2025-01-07 | Stop reason: HOSPADM

## 2025-01-07 RX ORDER — ROCURONIUM BROMIDE 10 MG/ML
INJECTION, SOLUTION INTRAVENOUS
Status: DISCONTINUED | OUTPATIENT
Start: 2025-01-07 | End: 2025-01-07 | Stop reason: SDUPTHER

## 2025-01-07 RX ORDER — PROPOFOL 10 MG/ML
INJECTION, EMULSION INTRAVENOUS
Status: DISCONTINUED | OUTPATIENT
Start: 2025-01-07 | End: 2025-01-07 | Stop reason: SDUPTHER

## 2025-01-07 RX ORDER — ONDANSETRON 2 MG/ML
INJECTION INTRAMUSCULAR; INTRAVENOUS
Status: DISCONTINUED | OUTPATIENT
Start: 2025-01-07 | End: 2025-01-07 | Stop reason: SDUPTHER

## 2025-01-07 RX ORDER — MAGNESIUM SULFATE 1 G/100ML
1000 INJECTION INTRAVENOUS ONCE
Status: COMPLETED | OUTPATIENT
Start: 2025-01-07 | End: 2025-01-07

## 2025-01-07 RX ORDER — OXYCODONE HYDROCHLORIDE 5 MG/1
5 TABLET ORAL PRN
Status: DISCONTINUED | OUTPATIENT
Start: 2025-01-07 | End: 2025-01-07 | Stop reason: HOSPADM

## 2025-01-07 RX ORDER — SODIUM CHLORIDE, SODIUM LACTATE, POTASSIUM CHLORIDE, CALCIUM CHLORIDE 600; 310; 30; 20 MG/100ML; MG/100ML; MG/100ML; MG/100ML
INJECTION, SOLUTION INTRAVENOUS
Status: DISCONTINUED | OUTPATIENT
Start: 2025-01-07 | End: 2025-01-07 | Stop reason: SDUPTHER

## 2025-01-07 RX ORDER — EPHEDRINE SULFATE 50 MG/ML
INJECTION INTRAVENOUS
Status: DISCONTINUED | OUTPATIENT
Start: 2025-01-07 | End: 2025-01-07 | Stop reason: SDUPTHER

## 2025-01-07 RX ORDER — SODIUM CHLORIDE 0.9 % (FLUSH) 0.9 %
5-40 SYRINGE (ML) INJECTION PRN
Status: DISCONTINUED | OUTPATIENT
Start: 2025-01-07 | End: 2025-01-07 | Stop reason: HOSPADM

## 2025-01-07 RX ORDER — OXYCODONE HYDROCHLORIDE 5 MG/1
10 TABLET ORAL PRN
Status: DISCONTINUED | OUTPATIENT
Start: 2025-01-07 | End: 2025-01-07 | Stop reason: HOSPADM

## 2025-01-07 RX ORDER — LIDOCAINE HYDROCHLORIDE 20 MG/ML
INJECTION, SOLUTION INFILTRATION; PERINEURAL
Status: DISCONTINUED | OUTPATIENT
Start: 2025-01-07 | End: 2025-01-07 | Stop reason: SDUPTHER

## 2025-01-07 RX ORDER — POTASSIUM CHLORIDE 1500 MG/1
40 TABLET, EXTENDED RELEASE ORAL ONCE
Status: COMPLETED | OUTPATIENT
Start: 2025-01-07 | End: 2025-01-07

## 2025-01-07 RX ORDER — SODIUM CHLORIDE 9 MG/ML
INJECTION, SOLUTION INTRAVENOUS PRN
Status: DISCONTINUED | OUTPATIENT
Start: 2025-01-07 | End: 2025-01-07 | Stop reason: HOSPADM

## 2025-01-07 RX ORDER — LABETALOL HYDROCHLORIDE 5 MG/ML
5 INJECTION, SOLUTION INTRAVENOUS EVERY 10 MIN PRN
Status: DISCONTINUED | OUTPATIENT
Start: 2025-01-07 | End: 2025-01-07 | Stop reason: HOSPADM

## 2025-01-07 RX ORDER — DEXAMETHASONE SODIUM PHOSPHATE 4 MG/ML
INJECTION, SOLUTION INTRA-ARTICULAR; INTRALESIONAL; INTRAMUSCULAR; INTRAVENOUS; SOFT TISSUE
Status: DISCONTINUED | OUTPATIENT
Start: 2025-01-07 | End: 2025-01-07 | Stop reason: SDUPTHER

## 2025-01-07 RX ADMIN — LORAZEPAM 0.5 MG: 0.5 TABLET ORAL at 04:46

## 2025-01-07 RX ADMIN — GABAPENTIN 100 MG: 100 CAPSULE ORAL at 21:29

## 2025-01-07 RX ADMIN — EPHEDRINE SULFATE 15 MG: 50 INJECTION INTRAVENOUS at 14:16

## 2025-01-07 RX ADMIN — IPRATROPIUM BROMIDE AND ALBUTEROL SULFATE 1 DOSE: 2.5; .5 SOLUTION RESPIRATORY (INHALATION) at 07:31

## 2025-01-07 RX ADMIN — LEVOTHYROXINE SODIUM 25 MCG: 25 TABLET ORAL at 05:00

## 2025-01-07 RX ADMIN — BUDESONIDE 500 MCG: 0.5 INHALANT RESPIRATORY (INHALATION) at 20:07

## 2025-01-07 RX ADMIN — PANTOPRAZOLE SODIUM 40 MG: 40 TABLET, DELAYED RELEASE ORAL at 21:29

## 2025-01-07 RX ADMIN — ROCURONIUM BROMIDE 50 MG: 10 INJECTION, SOLUTION INTRAVENOUS at 14:01

## 2025-01-07 RX ADMIN — BUSPIRONE HYDROCHLORIDE 15 MG: 7.5 TABLET ORAL at 21:28

## 2025-01-07 RX ADMIN — PIPERACILLIN AND TAZOBACTAM 3375 MG: 3; .375 INJECTION, POWDER, LYOPHILIZED, FOR SOLUTION INTRAVENOUS at 16:23

## 2025-01-07 RX ADMIN — EPHEDRINE SULFATE 25 MG: 50 INJECTION INTRAVENOUS at 14:15

## 2025-01-07 RX ADMIN — SUGAMMADEX 200 MG: 100 INJECTION, SOLUTION INTRAVENOUS at 14:37

## 2025-01-07 RX ADMIN — PIPERACILLIN AND TAZOBACTAM 3375 MG: 3; .375 INJECTION, POWDER, LYOPHILIZED, FOR SOLUTION INTRAVENOUS at 23:54

## 2025-01-07 RX ADMIN — SODIUM CHLORIDE, PRESERVATIVE FREE 10 ML: 5 INJECTION INTRAVENOUS at 09:54

## 2025-01-07 RX ADMIN — SODIUM CHLORIDE, POTASSIUM CHLORIDE, SODIUM LACTATE AND CALCIUM CHLORIDE: 600; 310; 30; 20 INJECTION, SOLUTION INTRAVENOUS at 14:01

## 2025-01-07 RX ADMIN — BUDESONIDE 500 MCG: 0.5 INHALANT RESPIRATORY (INHALATION) at 07:31

## 2025-01-07 RX ADMIN — PREDNISONE 40 MG: 20 TABLET ORAL at 16:14

## 2025-01-07 RX ADMIN — METOPROLOL TARTRATE 12.5 MG: 25 TABLET, FILM COATED ORAL at 21:29

## 2025-01-07 RX ADMIN — METOPROLOL TARTRATE 12.5 MG: 25 TABLET, FILM COATED ORAL at 09:54

## 2025-01-07 RX ADMIN — ROFLUMILAST 500 MCG: 500 TABLET ORAL at 16:14

## 2025-01-07 RX ADMIN — GUAIFENESIN 600 MG: 600 TABLET, EXTENDED RELEASE ORAL at 21:28

## 2025-01-07 RX ADMIN — IPRATROPIUM BROMIDE AND ALBUTEROL SULFATE 1 DOSE: 2.5; .5 SOLUTION RESPIRATORY (INHALATION) at 20:07

## 2025-01-07 RX ADMIN — ONDANSETRON 4 MG: 2 INJECTION INTRAMUSCULAR; INTRAVENOUS at 19:58

## 2025-01-07 RX ADMIN — IPRATROPIUM BROMIDE AND ALBUTEROL SULFATE 1 DOSE: 2.5; .5 SOLUTION RESPIRATORY (INHALATION) at 15:31

## 2025-01-07 RX ADMIN — ONDANSETRON 4 MG: 2 INJECTION, SOLUTION INTRAMUSCULAR; INTRAVENOUS at 14:15

## 2025-01-07 RX ADMIN — POTASSIUM CHLORIDE 40 MEQ: 1500 TABLET, EXTENDED RELEASE ORAL at 10:28

## 2025-01-07 RX ADMIN — SODIUM CHLORIDE: 9 INJECTION, SOLUTION INTRAVENOUS at 17:21

## 2025-01-07 RX ADMIN — IPRATROPIUM BROMIDE AND ALBUTEROL SULFATE 1 DOSE: 2.5; .5 SOLUTION RESPIRATORY (INHALATION) at 11:21

## 2025-01-07 RX ADMIN — LIDOCAINE HYDROCHLORIDE 60 MG: 20 INJECTION, SOLUTION INFILTRATION; PERINEURAL at 14:01

## 2025-01-07 RX ADMIN — PIPERACILLIN AND TAZOBACTAM 3375 MG: 3; .375 INJECTION, POWDER, LYOPHILIZED, FOR SOLUTION INTRAVENOUS at 04:52

## 2025-01-07 RX ADMIN — ATORVASTATIN CALCIUM 20 MG: 10 TABLET, FILM COATED ORAL at 21:29

## 2025-01-07 RX ADMIN — EPHEDRINE SULFATE 10 MG: 50 INJECTION INTRAVENOUS at 14:17

## 2025-01-07 RX ADMIN — ONDANSETRON 4 MG: 2 INJECTION INTRAMUSCULAR; INTRAVENOUS at 13:01

## 2025-01-07 RX ADMIN — DEXAMETHASONE SODIUM PHOSPHATE 4 MG: 4 INJECTION INTRA-ARTICULAR; INTRALESIONAL; INTRAMUSCULAR; INTRAVENOUS; SOFT TISSUE at 14:15

## 2025-01-07 RX ADMIN — PROPOFOL 100 MG: 10 INJECTION, EMULSION INTRAVENOUS at 14:01

## 2025-01-07 RX ADMIN — MAGNESIUM SULFATE HEPTAHYDRATE 1000 MG: 1 INJECTION, SOLUTION INTRAVENOUS at 12:05

## 2025-01-07 ASSESSMENT — PAIN SCALES - GENERAL
PAINLEVEL_OUTOF10: 0

## 2025-01-07 ASSESSMENT — COPD QUESTIONNAIRES: CAT_SEVERITY: SEVERE

## 2025-01-07 ASSESSMENT — PAIN - FUNCTIONAL ASSESSMENT
PAIN_FUNCTIONAL_ASSESSMENT: 0-10
PAIN_FUNCTIONAL_ASSESSMENT: NONE - DENIES PAIN

## 2025-01-07 NOTE — ANESTHESIA PRE PROCEDURE
Department of Anesthesiology  Preprocedure Note       Name:  Terri Smith   Age:  84 y.o.  :  1940                                          MRN:  [id number]         Date:  2025      Surgeon: Surgeon(s):  Celestine Lester MD    Procedure: Procedure(s):  ERCP W/ANES    Medications prior to admission:   Prior to Admission medications    Medication Sig Start Date End Date Taking? Authorizing Provider   busPIRone (BUSPAR) 15 MG tablet Take 15 mg by mouth 2 times daily Indications: Feeling Anxious   Yes Charmaine Gant MD   gabapentin (NEURONTIN) 100 MG capsule Take 1 capsule by mouth 3 times daily.   Yes Charmaine Gant MD   guaiFENesin (MUCINEX) 600 MG extended release tablet Take 2 tablets by mouth 2 times daily   Yes Charmaine Gant MD   magnesium oxide (MAG-OX) 400 (240 Mg) MG tablet Take 1 tablet by mouth daily   Yes Charmaine Gant MD   polyethylene glycol (GLYCOLAX) 17 g packet Take 1 packet by mouth nightly   Yes Charmaine Gant MD   furosemide (LASIX) 20 MG tablet Take 1 tablet by mouth daily 24  Yes Martina Kumar MD   metoprolol tartrate (LOPRESSOR) 25 MG tablet Take 0.5 tablets by mouth 2 times daily 24  Yes Martina Kumar MD   budesonide (PULMICORT) 0.5 MG/2ML nebulizer suspension Take 2 mLs by nebulization in the morning and 2 mLs in the evening. 24  Yes Mena Calzada MD   escitalopram (LEXAPRO) 10 MG tablet Take 1 tablet by mouth daily 24  Yes Mena Calzada MD   levothyroxine (SYNTHROID) 25 MCG tablet Take 1 tablet by mouth Daily for 120 doses 11/26/24 3/26/25 Yes Mena Calzada MD   pantoprazole (PROTONIX) 40 MG tablet Take 1 tablet by mouth in the morning and at bedtime 24  Yes Mena Calzada MD   ferrous sulfate (IRON 325) 325 (65 Fe) MG tablet Take 1 tablet by mouth daily (with breakfast)   Yes Charmaine Gant MD   docusate sodium (COLACE, DULCOLAX) 100 MG CAPS Take 100 mg by mouth daily

## 2025-01-07 NOTE — ANESTHESIA POSTPROCEDURE EVALUATION
Department of Anesthesiology  Postprocedure Note    Patient: Terri Smith  MRN: [id number]  YOB: 1940  Date of evaluation: 1/7/2025    Procedure Summary       Date: 01/07/25 Room / Location: [address]    Anesthesia Start: 1401 Anesthesia Stop: 1445    Procedures:       ENDOSCOPIC RETROGRADE CHOLANGIOPANCREATOGRAPHY SPHINCTER/PAPILLOTOMY      ENDOSCOPIC RETROGRADE CHOLANGIOPANCREATOGRAPHY STONE REMOVAL Diagnosis:       Common bile duct stone      (Common bile duct stone [K80.50])    Surgeons: Celestine Lester MD Responsible Provider: Miya Hurt MD    Anesthesia Type: general ASA Status: 3            Anesthesia Type: No value filed.    David Phase I: David Score: 8    David Phase II:      Anesthesia Post Evaluation    Comments: Postoperative Anesthesia Note    Name:    Terri Smith  MRN:      [id number]    Patient Vitals in the past 12 hrs:  01/07/25 1505, BP:139/78, SpO2:92 %  01/07/25 1500, BP:129/72, Pulse:88, Resp:14, SpO2:93 %  01/07/25 1455, BP:(!) 115/57, Pulse:85, Resp:16, SpO2:96 %  01/07/25 1450, BP:(!) 111/55, Temp:98.4 °F (36.9 °C), Temp src:Temporal, Pulse:87, Resp:14, SpO2:93 %  01/07/25 1328, BP:118/60, Temp:97.9 °F (36.6 °C), Temp src:Infrared, Pulse:89, Resp:18, SpO2:99 %  01/07/25 1121, Pulse:89, Resp:18, SpO2:98 %  01/07/25 0816, BP:(!) 140/83, Temp:97.5 °F (36.4 °C), Temp src:Oral, Pulse:88, Resp:18, SpO2:98 %  01/07/25 0731, Pulse:89, Resp:18, SpO2:100 %  01/07/25 0437, BP:(!) 162/88, Temp:97.9 °F (36.6 °C), Temp src:Oral, Pulse:85, Resp:18, SpO2:100 %     LABS:    CBC  Lab Results       Component                Value               Date/Time                  WBC                      4.6                 01/07/2025 05:52 AM        HGB                      8.0 (L)             01/07/2025 05:52 AM        HCT                      25.7 (L)            01/07/2025 05:52 AM        PLT                      398

## 2025-01-07 NOTE — FLOWSHEET NOTE
01/07/25 0437   Vital Signs   Temp 97.9 °F (36.6 °C)   Temp Source Oral   Pulse 85   Heart Rate Source Monitor   Respirations 18   BP (!) 162/88   MAP (Calculated) 113   BP Location Right upper arm   BP Method Automatic   Patient Position Semi fowlers   Pain Assessment   Pain Assessment None - Denies Pain   Pain Level 0   Opioid-Induced Sedation   POSS Score 1   Oxygen Therapy   SpO2 100 %   O2 Device High flow nasal cannula   O2 Flow Rate (L/min) 3 L/min     Pt VS as shown above.

## 2025-01-07 NOTE — FLOWSHEET NOTE
01/07/25 1457   Skin Integumentary    Skin Integumentary (WDL) X   Skin Color Ecchymosis (comment);Pink   Skin Condition/Temp Warm;Fragile   Skin Integrity Tear   Location Right forearm   Multiple Skin Integrity Sites Yes   Skin Integrity Site 2   Skin Integrity Location 2 Wound (see LDA)   Location 2 Buttocks/labia   Preventative Dressing Yes   Dressing Site Sacrum   Date Applied 01/07/25   Assessed this shift Yes   Skin Integrity Site 3   Skin Integrity Location 3 Tear    Location 3 Left wrist   Preventative Dressing Yes   Date Applied 01/07/25   Assessed this shift Yes   Skin Integrity Site 4   Skin Integrity Location 4 Abrasion   Location 4 Right ventral shin   Preventative Dressing Yes   Date Applied 01/07/25   Assessed this shift Yes     RN made aware of new dressings/preventative measures placed.

## 2025-01-08 ENCOUNTER — APPOINTMENT (OUTPATIENT)
Dept: GENERAL RADIOLOGY | Age: 85
DRG: 444 | End: 2025-01-08
Payer: MEDICARE

## 2025-01-08 LAB
ALBUMIN SERPL-MCNC: 2.7 G/DL (ref 3.4–5)
ALBUMIN/GLOB SERPL: 1 {RATIO} (ref 1.1–2.2)
ALP SERPL-CCNC: 64 U/L (ref 40–129)
ALT SERPL-CCNC: 6 U/L (ref 10–40)
ANION GAP SERPL CALCULATED.3IONS-SCNC: 9 MMOL/L (ref 3–16)
AST SERPL-CCNC: 15 U/L (ref 15–37)
BASE EXCESS BLDA CALC-SCNC: 3.4 MMOL/L (ref -3–3)
BASE EXCESS BLDA CALC-SCNC: 5.6 MMOL/L (ref -3–3)
BASOPHILS # BLD: 0 K/UL (ref 0–0.2)
BASOPHILS NFR BLD: 0.1 %
BILIRUB DIRECT SERPL-MCNC: <0.1 MG/DL (ref 0–0.3)
BILIRUB INDIRECT SERPL-MCNC: ABNORMAL MG/DL (ref 0–1)
BILIRUB SERPL-MCNC: <0.2 MG/DL (ref 0–1)
BUN SERPL-MCNC: 8 MG/DL (ref 7–20)
CALCIUM SERPL-MCNC: 8.8 MG/DL (ref 8.3–10.6)
CHLORIDE SERPL-SCNC: 108 MMOL/L (ref 99–110)
CO2 BLDA-SCNC: 34.2 MMOL/L
CO2 BLDA-SCNC: 34.4 MMOL/L
CO2 SERPL-SCNC: 31 MMOL/L (ref 21–32)
COHGB MFR BLDA: 0.1 % (ref 0–1.5)
COHGB MFR BLDA: 0.3 % (ref 0–1.5)
CREAT SERPL-MCNC: 0.9 MG/DL (ref 0.6–1.2)
DEPRECATED RDW RBC AUTO: 16 % (ref 12.4–15.4)
EOSINOPHIL # BLD: 0 K/UL (ref 0–0.6)
EOSINOPHIL NFR BLD: 0 %
GFR SERPLBLD CREATININE-BSD FMLA CKD-EPI: 63 ML/MIN/{1.73_M2}
GLUCOSE SERPL-MCNC: 86 MG/DL (ref 70–99)
HCO3 BLDA-SCNC: 32.2 MMOL/L (ref 21–29)
HCO3 BLDA-SCNC: 32.3 MMOL/L (ref 21–29)
HCT VFR BLD AUTO: 26.8 % (ref 36–48)
HGB BLD-MCNC: 8.4 G/DL (ref 12–16)
HGB BLDA-MCNC: 11.5 G/DL (ref 12–16)
HGB BLDA-MCNC: 8.3 G/DL (ref 12–16)
LYMPHOCYTES # BLD: 0.7 K/UL (ref 1–5.1)
LYMPHOCYTES NFR BLD: 6.9 %
MAGNESIUM SERPL-MCNC: 1.66 MG/DL (ref 1.8–2.4)
MAGNESIUM SERPL-MCNC: 1.78 MG/DL (ref 1.8–2.4)
MCH RBC QN AUTO: 29.7 PG (ref 26–34)
MCHC RBC AUTO-ENTMCNC: 31.3 G/DL (ref 31–36)
MCV RBC AUTO: 95 FL (ref 80–100)
METHGB MFR BLDA: 0.2 %
METHGB MFR BLDA: 0.2 %
MONOCYTES # BLD: 0.6 K/UL (ref 0–1.3)
MONOCYTES NFR BLD: 5.8 %
NEUTROPHILS # BLD: 8.4 K/UL (ref 1.7–7.7)
NEUTROPHILS NFR BLD: 87.2 %
O2 THERAPY: ABNORMAL
O2 THERAPY: ABNORMAL
PCO2 BLDA: 60.8 MMHG (ref 35–45)
PCO2 BLDA: 73 MMHG (ref 35–45)
PH BLDA: 7.26 [PH] (ref 7.35–7.45)
PH BLDA: 7.34 [PH] (ref 7.35–7.45)
PLATELET # BLD AUTO: 381 K/UL (ref 135–450)
PMV BLD AUTO: 6.8 FL (ref 5–10.5)
PO2 BLDA: 188.1 MMHG (ref 75–108)
PO2 BLDA: 191.5 MMHG (ref 75–108)
POTASSIUM SERPL-SCNC: 3.1 MMOL/L (ref 3.5–5.1)
POTASSIUM SERPL-SCNC: 3.8 MMOL/L (ref 3.5–5.1)
PROT SERPL-MCNC: 5.5 G/DL (ref 6.4–8.2)
RBC # BLD AUTO: 2.82 M/UL (ref 4–5.2)
SAO2 % BLDA: 99 %
SAO2 % BLDA: 99.2 %
SODIUM SERPL-SCNC: 148 MMOL/L (ref 136–145)
WBC # BLD AUTO: 9.7 K/UL (ref 4–11)

## 2025-01-08 PROCEDURE — 1200000000 HC SEMI PRIVATE

## 2025-01-08 PROCEDURE — 36415 COLL VENOUS BLD VENIPUNCTURE: CPT

## 2025-01-08 PROCEDURE — 6370000000 HC RX 637 (ALT 250 FOR IP): Performed by: STUDENT IN AN ORGANIZED HEALTH CARE EDUCATION/TRAINING PROGRAM

## 2025-01-08 PROCEDURE — 6360000002 HC RX W HCPCS: Performed by: STUDENT IN AN ORGANIZED HEALTH CARE EDUCATION/TRAINING PROGRAM

## 2025-01-08 PROCEDURE — 83735 ASSAY OF MAGNESIUM: CPT

## 2025-01-08 PROCEDURE — 36600 WITHDRAWAL OF ARTERIAL BLOOD: CPT

## 2025-01-08 PROCEDURE — 80053 COMPREHEN METABOLIC PANEL: CPT

## 2025-01-08 PROCEDURE — 2500000003 HC RX 250 WO HCPCS: Performed by: STUDENT IN AN ORGANIZED HEALTH CARE EDUCATION/TRAINING PROGRAM

## 2025-01-08 PROCEDURE — 82803 BLOOD GASES ANY COMBINATION: CPT

## 2025-01-08 PROCEDURE — 6370000000 HC RX 637 (ALT 250 FOR IP): Performed by: INTERNAL MEDICINE

## 2025-01-08 PROCEDURE — 6370000000 HC RX 637 (ALT 250 FOR IP): Performed by: NURSE PRACTITIONER

## 2025-01-08 PROCEDURE — 99231 SBSQ HOSP IP/OBS SF/LOW 25: CPT

## 2025-01-08 PROCEDURE — 2580000003 HC RX 258: Performed by: INTERNAL MEDICINE

## 2025-01-08 PROCEDURE — 6360000002 HC RX W HCPCS: Performed by: SURGERY

## 2025-01-08 PROCEDURE — 85025 COMPLETE CBC W/AUTO DIFF WBC: CPT

## 2025-01-08 PROCEDURE — 74220 X-RAY XM ESOPHAGUS 1CNTRST: CPT

## 2025-01-08 PROCEDURE — 94761 N-INVAS EAR/PLS OXIMETRY MLT: CPT

## 2025-01-08 PROCEDURE — 2700000000 HC OXYGEN THERAPY PER DAY

## 2025-01-08 PROCEDURE — 97530 THERAPEUTIC ACTIVITIES: CPT

## 2025-01-08 PROCEDURE — 99232 SBSQ HOSP IP/OBS MODERATE 35: CPT | Performed by: INTERNAL MEDICINE

## 2025-01-08 PROCEDURE — 2580000003 HC RX 258: Performed by: STUDENT IN AN ORGANIZED HEALTH CARE EDUCATION/TRAINING PROGRAM

## 2025-01-08 PROCEDURE — 94640 AIRWAY INHALATION TREATMENT: CPT

## 2025-01-08 PROCEDURE — 71046 X-RAY EXAM CHEST 2 VIEWS: CPT

## 2025-01-08 PROCEDURE — 6360000002 HC RX W HCPCS: Performed by: NURSE PRACTITIONER

## 2025-01-08 PROCEDURE — 84132 ASSAY OF SERUM POTASSIUM: CPT

## 2025-01-08 PROCEDURE — 94669 MECHANICAL CHEST WALL OSCILL: CPT

## 2025-01-08 RX ORDER — PREDNISONE 10 MG/1
TABLET ORAL
Qty: 30 TABLET | Refills: 0 | Status: SHIPPED | OUTPATIENT
Start: 2025-01-08 | End: 2025-01-30

## 2025-01-08 RX ORDER — POTASSIUM CHLORIDE 1500 MG/1
40 TABLET, EXTENDED RELEASE ORAL ONCE
Status: COMPLETED | OUTPATIENT
Start: 2025-01-08 | End: 2025-01-08

## 2025-01-08 RX ORDER — MECLIZINE HCL 12.5 MG 12.5 MG/1
12.5 TABLET ORAL 3 TIMES DAILY PRN
Qty: 90 TABLET | Refills: 0 | Status: SHIPPED | OUTPATIENT
Start: 2025-01-08 | End: 2025-01-30

## 2025-01-08 RX ORDER — AMOXICILLIN AND CLAVULANATE POTASSIUM 500; 125 MG/1; MG/1
1 TABLET, FILM COATED ORAL 3 TIMES DAILY
Qty: 15 TABLET | Refills: 0 | Status: SHIPPED | OUTPATIENT
Start: 2025-01-08 | End: 2025-01-13

## 2025-01-08 RX ADMIN — LORAZEPAM 0.5 MG: 0.5 TABLET ORAL at 20:45

## 2025-01-08 RX ADMIN — LORAZEPAM 0.5 MG: 0.5 TABLET ORAL at 12:32

## 2025-01-08 RX ADMIN — GUAIFENESIN 600 MG: 600 TABLET, EXTENDED RELEASE ORAL at 20:53

## 2025-01-08 RX ADMIN — BUDESONIDE 500 MCG: 0.5 INHALANT RESPIRATORY (INHALATION) at 08:20

## 2025-01-08 RX ADMIN — GABAPENTIN 100 MG: 100 CAPSULE ORAL at 17:53

## 2025-01-08 RX ADMIN — PIPERACILLIN AND TAZOBACTAM 3375 MG: 3; .375 INJECTION, POWDER, LYOPHILIZED, FOR SOLUTION INTRAVENOUS at 05:38

## 2025-01-08 RX ADMIN — SODIUM CHLORIDE: 9 INJECTION, SOLUTION INTRAVENOUS at 21:00

## 2025-01-08 RX ADMIN — BUDESONIDE 500 MCG: 0.5 INHALANT RESPIRATORY (INHALATION) at 19:49

## 2025-01-08 RX ADMIN — LORAZEPAM 0.5 MG: 0.5 TABLET ORAL at 00:03

## 2025-01-08 RX ADMIN — IPRATROPIUM BROMIDE AND ALBUTEROL SULFATE 1 DOSE: 2.5; .5 SOLUTION RESPIRATORY (INHALATION) at 08:20

## 2025-01-08 RX ADMIN — PANTOPRAZOLE SODIUM 40 MG: 40 TABLET, DELAYED RELEASE ORAL at 20:51

## 2025-01-08 RX ADMIN — IPRATROPIUM BROMIDE AND ALBUTEROL SULFATE 1 DOSE: 2.5; .5 SOLUTION RESPIRATORY (INHALATION) at 19:49

## 2025-01-08 RX ADMIN — GABAPENTIN 100 MG: 100 CAPSULE ORAL at 09:15

## 2025-01-08 RX ADMIN — SODIUM CHLORIDE: 9 INJECTION, SOLUTION INTRAVENOUS at 05:34

## 2025-01-08 RX ADMIN — BUSPIRONE HYDROCHLORIDE 15 MG: 7.5 TABLET ORAL at 09:15

## 2025-01-08 RX ADMIN — METOPROLOL TARTRATE 12.5 MG: 25 TABLET, FILM COATED ORAL at 09:15

## 2025-01-08 RX ADMIN — APIXABAN 2.5 MG: 5 TABLET, FILM COATED ORAL at 20:52

## 2025-01-08 RX ADMIN — GUAIFENESIN 600 MG: 600 TABLET, EXTENDED RELEASE ORAL at 09:15

## 2025-01-08 RX ADMIN — POTASSIUM CHLORIDE 40 MEQ: 1500 TABLET, EXTENDED RELEASE ORAL at 10:18

## 2025-01-08 RX ADMIN — Medication 400 MG: at 09:15

## 2025-01-08 RX ADMIN — LEVOTHYROXINE SODIUM 25 MCG: 25 TABLET ORAL at 06:40

## 2025-01-08 RX ADMIN — BUSPIRONE HYDROCHLORIDE 15 MG: 7.5 TABLET ORAL at 20:48

## 2025-01-08 RX ADMIN — BUSPIRONE HYDROCHLORIDE 10 MG: 10 TABLET ORAL at 18:00

## 2025-01-08 RX ADMIN — IPRATROPIUM BROMIDE AND ALBUTEROL SULFATE 1 DOSE: 2.5; .5 SOLUTION RESPIRATORY (INHALATION) at 11:23

## 2025-01-08 RX ADMIN — ATORVASTATIN CALCIUM 20 MG: 10 TABLET, FILM COATED ORAL at 20:52

## 2025-01-08 RX ADMIN — DOCUSATE SODIUM 100 MG: 100 CAPSULE, LIQUID FILLED ORAL at 09:15

## 2025-01-08 RX ADMIN — ACETAMINOPHEN 650 MG: 325 TABLET ORAL at 00:03

## 2025-01-08 RX ADMIN — POTASSIUM CHLORIDE 40 MEQ: 1500 TABLET, EXTENDED RELEASE ORAL at 17:53

## 2025-01-08 RX ADMIN — SODIUM CHLORIDE, PRESERVATIVE FREE 10 ML: 5 INJECTION INTRAVENOUS at 21:00

## 2025-01-08 RX ADMIN — ESCITALOPRAM OXALATE 10 MG: 10 TABLET ORAL at 09:15

## 2025-01-08 RX ADMIN — ROFLUMILAST 500 MCG: 500 TABLET ORAL at 09:15

## 2025-01-08 RX ADMIN — IPRATROPIUM BROMIDE AND ALBUTEROL SULFATE 1 DOSE: 2.5; .5 SOLUTION RESPIRATORY (INHALATION) at 15:18

## 2025-01-08 RX ADMIN — METOPROLOL TARTRATE 12.5 MG: 25 TABLET, FILM COATED ORAL at 20:44

## 2025-01-08 RX ADMIN — ALBUTEROL SULFATE 2.5 MG: 2.5 SOLUTION RESPIRATORY (INHALATION) at 03:29

## 2025-01-08 RX ADMIN — PANTOPRAZOLE SODIUM 40 MG: 40 TABLET, DELAYED RELEASE ORAL at 09:15

## 2025-01-08 RX ADMIN — PREDNISONE 40 MG: 20 TABLET ORAL at 09:15

## 2025-01-08 RX ADMIN — GABAPENTIN 100 MG: 100 CAPSULE ORAL at 20:43

## 2025-01-08 RX ADMIN — AMOXICILLIN AND CLAVULANATE POTASSIUM 1 TABLET: 875; 125 TABLET, FILM COATED ORAL at 17:53

## 2025-01-08 ASSESSMENT — PAIN DESCRIPTION - ORIENTATION: ORIENTATION: RIGHT;LEFT

## 2025-01-08 ASSESSMENT — PAIN SCALES - GENERAL
PAINLEVEL_OUTOF10: 0
PAINLEVEL_OUTOF10: 3

## 2025-01-08 ASSESSMENT — PAIN DESCRIPTION - DESCRIPTORS: DESCRIPTORS: DISCOMFORT

## 2025-01-08 ASSESSMENT — PAIN DESCRIPTION - ONSET: ONSET: ON-GOING

## 2025-01-08 ASSESSMENT — PAIN DESCRIPTION - FREQUENCY: FREQUENCY: INTERMITTENT

## 2025-01-08 ASSESSMENT — PAIN - FUNCTIONAL ASSESSMENT: PAIN_FUNCTIONAL_ASSESSMENT: ACTIVITIES ARE NOT PREVENTED

## 2025-01-08 ASSESSMENT — PAIN DESCRIPTION - PAIN TYPE: TYPE: ACUTE PAIN

## 2025-01-08 ASSESSMENT — PAIN DESCRIPTION - LOCATION: LOCATION: ABDOMEN

## 2025-01-08 NOTE — CARE COORDINATION
Chart reviewed. Called Emelia at [facility]. Emelia reports pt is unable to return there at facility at [place]. Pt was not participating in therapy and family was aware of out of pocket expense if pt was going to continue there for rehab. Family is not wanting to pay out of pocket at this time. Son in room and verbalizes he has a hard time caring for pt at home and she will need to go to rehab. Called pt daughter and discussed DC plans. Daughter and son both agreeable to SNF. 1st choice [facility]. 2nd choice [facility]. Referral called to Steven at [facility]. Unable to accept due to no beds available. Referral called to Jennifer at [facility]. No beds available today. Possible bed available tmrw. Jennifer will call and keep CM updated on decision.

## 2025-01-08 NOTE — DISCHARGE INSTRUCTIONS
Your information:  Name: Terri Smith  : 1940    Your instructions:    Follow up with family doctor in 1 week.    What to do after you leave the hospital:    Recommended diet:  full liquid  advance as tolerated    Recommended activity: activity as tolerated        The following personal items were collected during your admission and were returned to you:    Belongings  Dental Appliances: None  Vision - Corrective Lenses: None  Hearing Aid: None  Clothing:  (Hospital gown)  Jewelry: None  Body Piercings Removed: N/A  Electronic Devices: None  Weapons (Notify Protective Services/Security): None  Other Valuables: At home  Home Medications: None  Valuables Given To: Patient  Provide Name(s) of Who Valuable(s) Were Given To: Terri    Information obtained by:  By signing below, I understand that if any problems occur once I leave the hospital I am to contact family doctor.  I understand and acknowledge receipt of the instructions indicated above. Plan to follow up with Dr. Toure in office next week, call the office to schedule. Stick to a low fat diet until your gallbladder has been removed. Complete entire course of antibiotics. Call the [facility] office with questions or concerns.     [facility] office;  and Wednesday.   [phone].  [address] [place] [address], [address] office;  only.   [phone].   [address] [place] [address]                      Heart Failure Resources:  Heart Failure Interactive Workbook:  Go to https://[employer].com/publication/?u=867878 for a Free Heart Failure Interactive Workbook provided by The American [employer] Association. This interactive workbook will provide information on Healthier Living with Heart Failure. Please copy and paste link into search bar. Use your mouse to scroll through the pages.    HF Thornton whitney:   Heart Failure Free smart phone whitney available for iPhone and Android download.

## 2025-01-08 NOTE — DISCHARGE INSTR - COC
Continuity of Care Form    Patient Name: Terri Smith   :  1940  MRN:  [id number]    Admit date:  1/3/2025  Discharge date:  25    Code Status Order: Full Code   Advance Directives:   Advance Care Flowsheet Documentation        Date/Time Healthcare Directive Type of Healthcare Directive Copy in Chart Healthcare Agent Appointed Healthcare Agent's Name Healthcare Agent's Phone Number    [phone] Yes, patient has an advance directive for healthcare treatment  Durable power of  for health care  Yes, copy in chart  --  --  --                     Admitting Physician:  Roxana Mcfadden DO  PCP: Jackson Ontiveros MD    Discharging Nurse: ***  Discharging Hospital Unit/Room#: 0231/0231-02  Discharging Unit Phone Number: [phone]    Emergency Contact:   Extended Emergency Contact Information  Primary Emergency Contact: Juan Smith   [facility]  Home Phone: [phone]  Relation: Child  Secondary Emergency Contact: Hafsa Tapia  Home Phone: [phone]  Mobile Phone: [phone]  Relation: Grandchild    Past Surgical History:  Past Surgical History:   Procedure Laterality Date    BRONCHOSCOPY  2019    Dr. Wheatley - w/BAL    CARDIAC CATHETERIZATION  2019    Dr. Palomares     SECTION      COLONOSCOPY N/A 2020    COLONOSCOPY DIAGNOSTIC performed by Rowdy Schmitz DO at [facility] ENDOSCOPY    COLONOSCOPY N/A 10/22/2021    COLONOSCOPY POLYPECTOMY SNARE/COLD BIOPSY performed by Celestine Lester MD at [facility] ENDOSCOPY    COLONOSCOPY N/A 2023    COLONOSCOPY POLYPECTOMY SNARE performed by Darron Langford MD at [facility] ENDOSCOPY    COLONOSCOPY N/A 2024    COLONOSCOPY POLYPECTOMY SNARE/BIOPSY performed by Calvin Lopez MD at [facility] ENDOSCOPY    CORONARY ARTERY BYPASS GRAFT N/A 2019    OFF PUMP CORONARY ARTERY BYPASS GRAFTING X2, INTERNAL MAMMARY ARTERY, SAPHENOUS VEIN GRAFT [facility]

## 2025-01-08 NOTE — DISCHARGE SUMMARY
Name:  Terri Smith  Room:  0231/0231-02  MRN:    [id number]    Discharge Summary      This discharge summary is in conjunction with a complete physical exam done on the day of discharge.      Discharging Physician: EDWARD CASE MD      Admit: 1/3/2025  Discharge:  1/8/2025     Diagnoses this Admission    Principal Problem:    Cholecystitis  Active Problems:    Chronic hypoxic respiratory failure    Choledocholithiasis with acute cholecystitis    Hyperkalemia    Nausea and vomiting    Pulmonary nodule    Chronic hypoxemic respiratory failure    Dysphagia  Resolved Problems:    * No resolved hospital problems. *      Procedures (Please Review Full Report for Details)    ERCP    Consults    IP CONSULT TO GI  IP CONSULT TO GENERAL SURGERY  IP CONSULT TO PULMONOLOGY  IP CONSULT TO VASCULAR ACCESS TEAM      HPI:    The patient is a 84 y.o. female with pmhx of atrial fibrillation, COPD, chronic respiratory failure, CHF who presented to [facility] ED with complaint of tailbone pain and nausea. Pt is a poor historian.  Family at bedside and stated that she has not been able to keep things down for a couple days.  Pt is on home oxygen.  She will wake up to verbal stimuli and stated she is not nauseated at this time and denies any abdominal pain.     Physical Exam at Discharge:  BP (!) 161/75   Pulse 80   Temp 97.6 °F (36.4 °C) (Oral)   Resp 18   Ht 1.626 m (5' 4.02\")   Wt 44.9 kg (98 lb 14.4 oz)   SpO2 96%   BMI 16.97 kg/m²     Gen: No distress. Alert. Appears chronically ill, thin, pleasant elderly female.  Awake alert and oriented   Eyes: PERRL. No sclera icterus. No conjunctival injection.   ENT: No discharge. Pharynx clear.   Neck: No JVD.  Trachea midline.  Resp: No accessory muscle use. No crackles. No wheezes. No rhonchi.  Diminished breath sounds  CV: Regular rate. Regular rhythm. No murmur.  No rub. No edema.   Chronic venous stasis changes  +dressing to LLE  Capillary Refill: Brisk,< 3

## 2025-01-09 LAB
BASE EXCESS BLDV CALC-SCNC: 0.7 MMOL/L (ref -3–3)
CO2 BLDV-SCNC: 28 MMOL/L
COHGB MFR BLDV: 1.8 % (ref 0–1.5)
HCO3 BLDV-SCNC: 26.3 MMOL/L (ref 23–29)
METHGB MFR BLDV: 0.2 %
O2 CT VFR BLDV CALC: 14 VOL %
O2 THERAPY: ABNORMAL
PCO2 BLDV: 46.8 MMHG (ref 40–50)
PH BLDV: 7.37 [PH] (ref 7.35–7.45)
PO2 BLDV: 86.9 MMHG (ref 25–40)
SAO2 % BLDV: 96 %

## 2025-01-09 PROCEDURE — 94660 CPAP INITIATION&MGMT: CPT

## 2025-01-09 PROCEDURE — 2500000003 HC RX 250 WO HCPCS: Performed by: STUDENT IN AN ORGANIZED HEALTH CARE EDUCATION/TRAINING PROGRAM

## 2025-01-09 PROCEDURE — 6370000000 HC RX 637 (ALT 250 FOR IP): Performed by: NURSE PRACTITIONER

## 2025-01-09 PROCEDURE — 36415 COLL VENOUS BLD VENIPUNCTURE: CPT

## 2025-01-09 PROCEDURE — 2700000000 HC OXYGEN THERAPY PER DAY

## 2025-01-09 PROCEDURE — 6370000000 HC RX 637 (ALT 250 FOR IP): Performed by: INTERNAL MEDICINE

## 2025-01-09 PROCEDURE — 97530 THERAPEUTIC ACTIVITIES: CPT

## 2025-01-09 PROCEDURE — 6360000002 HC RX W HCPCS: Performed by: NURSE PRACTITIONER

## 2025-01-09 PROCEDURE — 92526 ORAL FUNCTION THERAPY: CPT

## 2025-01-09 PROCEDURE — 1200000000 HC SEMI PRIVATE

## 2025-01-09 PROCEDURE — 94761 N-INVAS EAR/PLS OXIMETRY MLT: CPT

## 2025-01-09 PROCEDURE — 2580000003 HC RX 258: Performed by: INTERNAL MEDICINE

## 2025-01-09 PROCEDURE — 99233 SBSQ HOSP IP/OBS HIGH 50: CPT | Performed by: INTERNAL MEDICINE

## 2025-01-09 PROCEDURE — 6370000000 HC RX 637 (ALT 250 FOR IP): Performed by: STUDENT IN AN ORGANIZED HEALTH CARE EDUCATION/TRAINING PROGRAM

## 2025-01-09 PROCEDURE — 94640 AIRWAY INHALATION TREATMENT: CPT

## 2025-01-09 PROCEDURE — 82803 BLOOD GASES ANY COMBINATION: CPT

## 2025-01-09 PROCEDURE — 94669 MECHANICAL CHEST WALL OSCILL: CPT

## 2025-01-09 RX ADMIN — BUSPIRONE HYDROCHLORIDE 15 MG: 7.5 TABLET ORAL at 09:22

## 2025-01-09 RX ADMIN — PANTOPRAZOLE SODIUM 40 MG: 40 TABLET, DELAYED RELEASE ORAL at 09:23

## 2025-01-09 RX ADMIN — LEVOTHYROXINE SODIUM 25 MCG: 25 TABLET ORAL at 05:16

## 2025-01-09 RX ADMIN — BUDESONIDE 500 MCG: 0.5 INHALANT RESPIRATORY (INHALATION) at 20:57

## 2025-01-09 RX ADMIN — GUAIFENESIN 600 MG: 600 TABLET, EXTENDED RELEASE ORAL at 21:55

## 2025-01-09 RX ADMIN — IPRATROPIUM BROMIDE AND ALBUTEROL SULFATE 1 DOSE: 2.5; .5 SOLUTION RESPIRATORY (INHALATION) at 15:11

## 2025-01-09 RX ADMIN — AMOXICILLIN AND CLAVULANATE POTASSIUM 1 TABLET: 875; 125 TABLET, FILM COATED ORAL at 21:55

## 2025-01-09 RX ADMIN — Medication 400 MG: at 09:23

## 2025-01-09 RX ADMIN — AMOXICILLIN AND CLAVULANATE POTASSIUM 1 TABLET: 875; 125 TABLET, FILM COATED ORAL at 09:23

## 2025-01-09 RX ADMIN — FUROSEMIDE 20 MG: 20 TABLET ORAL at 09:23

## 2025-01-09 RX ADMIN — IPRATROPIUM BROMIDE AND ALBUTEROL SULFATE 1 DOSE: 2.5; .5 SOLUTION RESPIRATORY (INHALATION) at 07:59

## 2025-01-09 RX ADMIN — PREDNISONE 40 MG: 20 TABLET ORAL at 09:23

## 2025-01-09 RX ADMIN — GABAPENTIN 100 MG: 100 CAPSULE ORAL at 09:22

## 2025-01-09 RX ADMIN — APIXABAN 2.5 MG: 5 TABLET, FILM COATED ORAL at 21:55

## 2025-01-09 RX ADMIN — PANTOPRAZOLE SODIUM 40 MG: 40 TABLET, DELAYED RELEASE ORAL at 21:56

## 2025-01-09 RX ADMIN — BUDESONIDE 500 MCG: 0.5 INHALANT RESPIRATORY (INHALATION) at 08:00

## 2025-01-09 RX ADMIN — GUAIFENESIN 600 MG: 600 TABLET, EXTENDED RELEASE ORAL at 09:22

## 2025-01-09 RX ADMIN — METOPROLOL TARTRATE 12.5 MG: 25 TABLET, FILM COATED ORAL at 09:22

## 2025-01-09 RX ADMIN — ROFLUMILAST 500 MCG: 500 TABLET ORAL at 09:23

## 2025-01-09 RX ADMIN — IPRATROPIUM BROMIDE AND ALBUTEROL SULFATE 1 DOSE: 2.5; .5 SOLUTION RESPIRATORY (INHALATION) at 20:57

## 2025-01-09 RX ADMIN — BUSPIRONE HYDROCHLORIDE 15 MG: 7.5 TABLET ORAL at 21:55

## 2025-01-09 RX ADMIN — IPRATROPIUM BROMIDE AND ALBUTEROL SULFATE 1 DOSE: 2.5; .5 SOLUTION RESPIRATORY (INHALATION) at 11:42

## 2025-01-09 RX ADMIN — ESCITALOPRAM OXALATE 10 MG: 10 TABLET ORAL at 09:22

## 2025-01-09 RX ADMIN — ATORVASTATIN CALCIUM 20 MG: 10 TABLET, FILM COATED ORAL at 21:55

## 2025-01-09 RX ADMIN — SODIUM CHLORIDE: 9 INJECTION, SOLUTION INTRAVENOUS at 21:39

## 2025-01-09 RX ADMIN — APIXABAN 2.5 MG: 5 TABLET, FILM COATED ORAL at 09:22

## 2025-01-09 RX ADMIN — DOCUSATE SODIUM 100 MG: 100 CAPSULE, LIQUID FILLED ORAL at 09:23

## 2025-01-09 RX ADMIN — ACETAMINOPHEN 650 MG: 325 TABLET ORAL at 05:16

## 2025-01-09 RX ADMIN — SODIUM CHLORIDE: 9 INJECTION, SOLUTION INTRAVENOUS at 05:18

## 2025-01-09 RX ADMIN — GABAPENTIN 100 MG: 100 CAPSULE ORAL at 21:56

## 2025-01-09 RX ADMIN — SODIUM CHLORIDE, PRESERVATIVE FREE 10 ML: 5 INJECTION INTRAVENOUS at 09:23

## 2025-01-09 ASSESSMENT — PAIN SCALES - GENERAL
PAINLEVEL_OUTOF10: 5
PAINLEVEL_OUTOF10: 0
PAINLEVEL_OUTOF10: 0

## 2025-01-09 ASSESSMENT — PAIN - FUNCTIONAL ASSESSMENT: PAIN_FUNCTIONAL_ASSESSMENT: PREVENTS OR INTERFERES SOME ACTIVE ACTIVITIES AND ADLS

## 2025-01-09 ASSESSMENT — PAIN SCALES - WONG BAKER: WONGBAKER_NUMERICALRESPONSE: NO HURT

## 2025-01-09 ASSESSMENT — PAIN DESCRIPTION - LOCATION: LOCATION: GENERALIZED

## 2025-01-09 NOTE — ED PROVIDER NOTES
Emergency Department Attending SUPERVISORY Provider Note  Location: [facility]  ED  6/6/2024     Chief Complaint   Patient presents with    Chest Pain     Pt complains of chest pain starting this afternoon, rates it a 5/10. SOB, wears 2 1/2 L at home. Received 324 mg ASA at home. Heart Cath in 2019          PATIENT ID:  Terri Smith is a 83 y.o. female    Past Medical History:   Diagnosis Date    NADJA (acute kidney injury) ([facility])     Asthma     Atrial fibrillation with RVR ([facility])     CAD (coronary artery disease)     CHF (congestive heart failure) ([facility])     unsure    Chronic anxiety     COPD (chronic obstructive pulmonary disease) ([facility])     COPD (chronic obstructive pulmonary disease) ([facility]) 08/19/2023    GERD (gastroesophageal reflux disease) 09/09/2021    Heart attack ([facility]) 2019    History of blood transfusion     Hyperlipidemia     Hypertension     MRSA (methicillin resistant staph aureus) culture positive 09/22/2019    + resp cx    OA (osteoarthritis) 08/12/2014    Thyroid disease        Terri Smith was evaluated in the Emergency Department for concerns of chest pain she was having earlier today, and on my evaluation, says she feels pretty good right now.  Here, no shortness of breath.  No falls or injuries.  No lightheadedness or dizziness..  History of CAD, status post CABG, on Lasix.  Last echo as below.  Says she has been compliant with her Lasix, but has really stopped drinking water, and notes that since she has been here, she has not urinated.  Her blood pressure is a little soft, and looks like she has a little bit of an uptrend in her BUN and creatinine.  Says she has mostly been drinking lemonade and lemon floats at night, and hardly any water outside of some coffee.  Otherwise, no no abdominal pain, nausea, vomiting, diarrhea.    Last ECHO 12/2023:   Summary   Left ventricular systolic function is normal with ejection fraction   estimated at 55-60 %.   No regional wall motion 
pain which is exertional in nature.  Significant cardiac history to include catheterization in 2019.  Did take aspirin PTA.  Troponins elevated and stable at 51.  She is anemic, however is chronically anemic and is at her baseline status.  No other concerning findings on CBC.  Chloride is decreased 97.  CO2 is elevated at 35.  She is hyperglycemic with glucose of 105.  BUN is elevated 29, creatinine slightly elevated 1.3 and GFR slightly decreased to 41.  No other concerning findings on CMP.  Chest x-ray was negative.  EKG was interpreted as sinus rhythm with PACs which is unchanged compared to previous EKG performed in February.  Heart score 6.  Patient received IV fluids here in the emergency department.  Triage vitals /68, pulse 76, respiration 14, temperature 98.0 °F, SpO2 100% on 2 L via nasal cannula.  Vital signs remained stable on course ED stay.  A discussion was had with hospitalist regarding admission for cardiac evaluation.  Hospitalist agrees when the patient today.  Risk management discussed and shared decision making had with patient and/or surrogate. All questions were answered.     CRITICAL CARE TIME  0 Minutes of critical care time spent not including separately billable procedures.    MDM  Results for orders placed or performed during the hospital encounter of 06/06/24   CBC with Auto Differential   Result Value Ref Range    WBC 9.6 4.0 - 11.0 K/uL    RBC 3.68 (L) 4.00 - 5.20 M/uL    Hemoglobin 11.0 (L) 12.0 - 16.0 g/dL    Hematocrit 33.5 (L) 36.0 - 48.0 %    MCV 91.0 80.0 - 100.0 fL    MCH 29.8 26.0 - 34.0 pg    MCHC 32.7 31.0 - 36.0 g/dL    RDW 16.1 (H) 12.4 - 15.4 %    Platelets 209 135 - 450 K/uL    MPV 7.6 5.0 - 10.5 fL    Neutrophils % 67.0 %    Lymphocytes % 23.7 %    Monocytes % 6.5 %    Eosinophils % 2.1 %    Basophils % 0.7 %    Neutrophils Absolute 6.5 1.7 - 7.7 K/uL    Lymphocytes Absolute 2.3 1.0 - 5.1 K/uL    Monocytes Absolute 0.6 0.0 - 1.3 K/uL    Eosinophils Absolute 0.2 0.0 
no

## 2025-01-09 NOTE — FLOWSHEET NOTE
01/08/25 2030   Vital Signs   Temp 97.4 °F (36.3 °C)   Temp Source Oral   Pulse (!) 107   Heart Rate Source Monitor   Respirations 20   /66   MAP (Calculated) 89   BP Location Right upper arm   BP Method Automatic   Patient Position Semi fowlers   Pain Assessment   Pain Assessment None - Denies Pain   Pain Level 0   Opioid-Induced Sedation   POSS Score 1   Oxygen Therapy   SpO2 93 %   O2 Device High flow nasal cannula   O2 Flow Rate (L/min) 2 L/min     HS assessment completed. No complaints of pain or discomfort voiced. No signs or symptoms of distress noted. Patient tolerated AM medications well. Respirations easy and even. Bed in lowest position, bed alarm in place and functioning properly, bed rails x2 up,  Call light within reach.     Bedside Mobility Assessment Tool (BMAT):     Assessment Level 1- Sit and Shake    1. From a semi-reclined position, ask patient to sit up and rotate to a seated position at the side of the bed. Can use the bedrail.    2. Ask patient to reach out and grab your hand and shake making sure patient reaches across his/her midline.   Pass- Patient is able to come to a seated position, maintain core strength. Maintains seated balance while reaching across midline. Move on to Assessment Level 2.     Assessment Level 2- Stretch and Point   1. With patient in seated position at the side of the bed, have patient place both feet on the floor (or stool) with knees no higher than hips.    2. Ask patient to stretch one leg and straighten the knee, then bend the ankle/flex and point the toes. If appropriate, repeat with the other leg.   Pass- Patient is able to demonstrate appropriate quad strength on intended weight bearing limb(s). Move onto Assessment Level 3.     Assessment Level 3- Stand   1. Ask patient to elevate off the bed or chair (seated to standing) using an assistive device (cane, bedrail).    2. Patient should be able to raise buttocks off be and hold for a count of five. May

## 2025-01-09 NOTE — CARE COORDINATION
Call back from Jennifer at [facility]. Unable to accept pt due to no bed availability. Referral called to Paco at [facility]. Awaiting decision.     1130- call back from Paco at [facility]. Paco plans to perform an onsite today before making decision.

## 2025-01-10 ENCOUNTER — APPOINTMENT (OUTPATIENT)
Dept: GENERAL RADIOLOGY | Age: 85
DRG: 444 | End: 2025-01-10
Payer: MEDICARE

## 2025-01-10 LAB
ALBUMIN SERPL-MCNC: 2.7 G/DL (ref 3.4–5)
ALP SERPL-CCNC: 65 U/L (ref 40–129)
ALT SERPL-CCNC: 7 U/L (ref 10–40)
ANION GAP SERPL CALCULATED.3IONS-SCNC: 1 MMOL/L (ref 3–16)
ANION GAP SERPL CALCULATED.3IONS-SCNC: 5 MMOL/L (ref 3–16)
AST SERPL-CCNC: 11 U/L (ref 15–37)
BASE EXCESS BLDA CALC-SCNC: 1.6 MMOL/L (ref -3–3)
BASE EXCESS BLDV CALC-SCNC: 3 MMOL/L (ref -3–3)
BASOPHILS # BLD: 0 K/UL (ref 0–0.2)
BASOPHILS NFR BLD: 0.1 %
BILIRUB DIRECT SERPL-MCNC: <0.1 MG/DL (ref 0–0.3)
BILIRUB INDIRECT SERPL-MCNC: ABNORMAL MG/DL (ref 0–1)
BILIRUB SERPL-MCNC: <0.2 MG/DL (ref 0–1)
BUN SERPL-MCNC: 10 MG/DL (ref 7–20)
BUN SERPL-MCNC: 10 MG/DL (ref 7–20)
CALCIUM SERPL-MCNC: 8.8 MG/DL (ref 8.3–10.6)
CALCIUM SERPL-MCNC: 9.1 MG/DL (ref 8.3–10.6)
CHLORIDE SERPL-SCNC: 107 MMOL/L (ref 99–110)
CHLORIDE SERPL-SCNC: 113 MMOL/L (ref 99–110)
CO2 BLDA-SCNC: 33.7 MMOL/L
CO2 BLDV-SCNC: 32 MMOL/L
CO2 SERPL-SCNC: 32 MMOL/L (ref 21–32)
CO2 SERPL-SCNC: 33 MMOL/L (ref 21–32)
COHGB MFR BLDA: 0 % (ref 0–1.5)
COHGB MFR BLDV: 0.9 % (ref 0–1.5)
CREAT SERPL-MCNC: 0.8 MG/DL (ref 0.6–1.2)
CREAT SERPL-MCNC: 0.9 MG/DL (ref 0.6–1.2)
DEPRECATED RDW RBC AUTO: 16.4 % (ref 12.4–15.4)
EKG ATRIAL RATE: 132 BPM
EKG DIAGNOSIS: NORMAL
EKG P AXIS: 73 DEGREES
EKG P-R INTERVAL: 128 MS
EKG Q-T INTERVAL: 284 MS
EKG QRS DURATION: 86 MS
EKG QTC CALCULATION (BAZETT): 420 MS
EKG R AXIS: -58 DEGREES
EKG T AXIS: 92 DEGREES
EKG VENTRICULAR RATE: 132 BPM
EOSINOPHIL # BLD: 0 K/UL (ref 0–0.6)
EOSINOPHIL NFR BLD: 0.1 %
GFR SERPLBLD CREATININE-BSD FMLA CKD-EPI: 63 ML/MIN/{1.73_M2}
GFR SERPLBLD CREATININE-BSD FMLA CKD-EPI: 73 ML/MIN/{1.73_M2}
GLUCOSE BLD-MCNC: 118 MG/DL (ref 70–99)
GLUCOSE BLD-MCNC: 120 MG/DL (ref 70–99)
GLUCOSE BLD-MCNC: 142 MG/DL (ref 70–99)
GLUCOSE BLD-MCNC: 147 MG/DL (ref 70–99)
GLUCOSE BLD-MCNC: 152 MG/DL (ref 70–99)
GLUCOSE SERPL-MCNC: 126 MG/DL (ref 70–99)
GLUCOSE SERPL-MCNC: 150 MG/DL (ref 70–99)
HCO3 BLDA-SCNC: 31.1 MMOL/L (ref 21–29)
HCO3 BLDV-SCNC: 30.1 MMOL/L (ref 23–29)
HCT VFR BLD AUTO: 26.2 % (ref 36–48)
HGB BLD-MCNC: 8 G/DL (ref 12–16)
HGB BLDA-MCNC: 9.3 G/DL (ref 12–16)
LYMPHOCYTES # BLD: 0.8 K/UL (ref 1–5.1)
LYMPHOCYTES NFR BLD: 3.8 %
MAGNESIUM SERPL-MCNC: 1.45 MG/DL (ref 1.8–2.4)
MCH RBC QN AUTO: 28.9 PG (ref 26–34)
MCHC RBC AUTO-ENTMCNC: 30.5 G/DL (ref 31–36)
MCV RBC AUTO: 95 FL (ref 80–100)
METHGB MFR BLDA: 0.1 %
METHGB MFR BLDV: 0.3 %
MONOCYTES # BLD: 1.3 K/UL (ref 0–1.3)
MONOCYTES NFR BLD: 6.2 %
NEUTROPHILS # BLD: 18.7 K/UL (ref 1.7–7.7)
NEUTROPHILS NFR BLD: 89.8 %
O2 CT VFR BLDV CALC: 7 VOL %
O2 THERAPY: ABNORMAL
O2 THERAPY: ABNORMAL
PCO2 BLDA: 84 MMHG (ref 35–45)
PCO2 BLDV: 62.3 MMHG (ref 40–50)
PERFORMED ON: ABNORMAL
PH BLDA: 7.19 [PH] (ref 7.35–7.45)
PH BLDV: 7.3 [PH] (ref 7.35–7.45)
PLATELET # BLD AUTO: 372 K/UL (ref 135–450)
PMV BLD AUTO: 7.4 FL (ref 5–10.5)
PO2 BLDA: 248.7 MMHG (ref 75–108)
PO2 BLDV: 31.5 MMHG (ref 25–40)
POTASSIUM SERPL-SCNC: 3.1 MMOL/L (ref 3.5–5.1)
POTASSIUM SERPL-SCNC: 4.4 MMOL/L (ref 3.5–5.1)
PROT SERPL-MCNC: 6 G/DL (ref 6.4–8.2)
RBC # BLD AUTO: 2.76 M/UL (ref 4–5.2)
SAO2 % BLDA: 99.3 %
SAO2 % BLDV: 53 %
SODIUM SERPL-SCNC: 140 MMOL/L (ref 136–145)
SODIUM SERPL-SCNC: 151 MMOL/L (ref 136–145)
WBC # BLD AUTO: 20.9 K/UL (ref 4–11)

## 2025-01-10 PROCEDURE — 6360000002 HC RX W HCPCS: Performed by: NURSE PRACTITIONER

## 2025-01-10 PROCEDURE — 71045 X-RAY EXAM CHEST 1 VIEW: CPT

## 2025-01-10 PROCEDURE — 94660 CPAP INITIATION&MGMT: CPT

## 2025-01-10 PROCEDURE — 93005 ELECTROCARDIOGRAM TRACING: CPT | Performed by: INTERNAL MEDICINE

## 2025-01-10 PROCEDURE — 2580000003 HC RX 258: Performed by: INTERNAL MEDICINE

## 2025-01-10 PROCEDURE — 93010 ELECTROCARDIOGRAM REPORT: CPT | Performed by: INTERNAL MEDICINE

## 2025-01-10 PROCEDURE — 80076 HEPATIC FUNCTION PANEL: CPT

## 2025-01-10 PROCEDURE — 82803 BLOOD GASES ANY COMBINATION: CPT

## 2025-01-10 PROCEDURE — 6360000002 HC RX W HCPCS: Performed by: SURGERY

## 2025-01-10 PROCEDURE — 99291 CRITICAL CARE FIRST HOUR: CPT | Performed by: INTERNAL MEDICINE

## 2025-01-10 PROCEDURE — 6370000000 HC RX 637 (ALT 250 FOR IP): Performed by: INTERNAL MEDICINE

## 2025-01-10 PROCEDURE — 2700000000 HC OXYGEN THERAPY PER DAY

## 2025-01-10 PROCEDURE — 36415 COLL VENOUS BLD VENIPUNCTURE: CPT

## 2025-01-10 PROCEDURE — 94761 N-INVAS EAR/PLS OXIMETRY MLT: CPT

## 2025-01-10 PROCEDURE — 80048 BASIC METABOLIC PNL TOTAL CA: CPT

## 2025-01-10 PROCEDURE — 2000000000 HC ICU R&B

## 2025-01-10 PROCEDURE — 83735 ASSAY OF MAGNESIUM: CPT

## 2025-01-10 PROCEDURE — 6360000002 HC RX W HCPCS: Performed by: STUDENT IN AN ORGANIZED HEALTH CARE EDUCATION/TRAINING PROGRAM

## 2025-01-10 PROCEDURE — 6360000002 HC RX W HCPCS: Performed by: INTERNAL MEDICINE

## 2025-01-10 PROCEDURE — 76937 US GUIDE VASCULAR ACCESS: CPT

## 2025-01-10 PROCEDURE — 2500000003 HC RX 250 WO HCPCS: Performed by: INTERNAL MEDICINE

## 2025-01-10 PROCEDURE — 6370000000 HC RX 637 (ALT 250 FOR IP): Performed by: NURSE PRACTITIONER

## 2025-01-10 PROCEDURE — 94640 AIRWAY INHALATION TREATMENT: CPT

## 2025-01-10 PROCEDURE — C1751 CATH, INF, PER/CENT/MIDLINE: HCPCS

## 2025-01-10 PROCEDURE — 99233 SBSQ HOSP IP/OBS HIGH 50: CPT | Performed by: INTERNAL MEDICINE

## 2025-01-10 PROCEDURE — 2500000003 HC RX 250 WO HCPCS: Performed by: STUDENT IN AN ORGANIZED HEALTH CARE EDUCATION/TRAINING PROGRAM

## 2025-01-10 PROCEDURE — 85025 COMPLETE CBC W/AUTO DIFF WBC: CPT

## 2025-01-10 RX ORDER — LIDOCAINE HYDROCHLORIDE 10 MG/ML
50 INJECTION, SOLUTION INFILTRATION; PERINEURAL ONCE
Status: DISCONTINUED | OUTPATIENT
Start: 2025-01-10 | End: 2025-01-11

## 2025-01-10 RX ORDER — FUROSEMIDE 10 MG/ML
20 INJECTION INTRAMUSCULAR; INTRAVENOUS ONCE
Status: COMPLETED | OUTPATIENT
Start: 2025-01-10 | End: 2025-01-10

## 2025-01-10 RX ORDER — MUPIROCIN 20 MG/G
OINTMENT TOPICAL 2 TIMES DAILY
Status: DISPENSED | OUTPATIENT
Start: 2025-01-10 | End: 2025-01-15

## 2025-01-10 RX ORDER — SODIUM CHLORIDE 0.9 % (FLUSH) 0.9 %
5-40 SYRINGE (ML) INJECTION EVERY 12 HOURS SCHEDULED
Status: DISCONTINUED | OUTPATIENT
Start: 2025-01-10 | End: 2025-01-10 | Stop reason: SDUPTHER

## 2025-01-10 RX ORDER — SODIUM CHLORIDE 0.9 % (FLUSH) 0.9 %
5-40 SYRINGE (ML) INJECTION PRN
Status: DISCONTINUED | OUTPATIENT
Start: 2025-01-10 | End: 2025-01-10 | Stop reason: SDUPTHER

## 2025-01-10 RX ORDER — SODIUM CHLORIDE 9 MG/ML
INJECTION, SOLUTION INTRAVENOUS PRN
Status: DISCONTINUED | OUTPATIENT
Start: 2025-01-10 | End: 2025-01-10 | Stop reason: SDUPTHER

## 2025-01-10 RX ORDER — METOPROLOL TARTRATE 1 MG/ML
5 INJECTION, SOLUTION INTRAVENOUS EVERY 8 HOURS
Status: DISCONTINUED | OUTPATIENT
Start: 2025-01-10 | End: 2025-01-11

## 2025-01-10 RX ORDER — DEXTROSE MONOHYDRATE AND SODIUM CHLORIDE 5; .45 G/100ML; G/100ML
INJECTION, SOLUTION INTRAVENOUS CONTINUOUS
Status: DISCONTINUED | OUTPATIENT
Start: 2025-01-10 | End: 2025-01-11

## 2025-01-10 RX ADMIN — SODIUM CHLORIDE, PRESERVATIVE FREE 10 ML: 5 INJECTION INTRAVENOUS at 21:04

## 2025-01-10 RX ADMIN — DEXTROSE AND SODIUM CHLORIDE: 5; .45 INJECTION, SOLUTION INTRAVENOUS at 21:22

## 2025-01-10 RX ADMIN — DEXTROSE AND SODIUM CHLORIDE: 5; .45 INJECTION, SOLUTION INTRAVENOUS at 11:03

## 2025-01-10 RX ADMIN — METOPROLOL TARTRATE 5 MG: 5 INJECTION, SOLUTION INTRAVENOUS at 21:03

## 2025-01-10 RX ADMIN — PIPERACILLIN AND TAZOBACTAM 3375 MG: 3; .375 INJECTION, POWDER, LYOPHILIZED, FOR SOLUTION INTRAVENOUS at 16:55

## 2025-01-10 RX ADMIN — BUDESONIDE 500 MCG: 0.5 INHALANT RESPIRATORY (INHALATION) at 07:28

## 2025-01-10 RX ADMIN — ALBUTEROL SULFATE 2.5 MG: 2.5 SOLUTION RESPIRATORY (INHALATION) at 01:39

## 2025-01-10 RX ADMIN — PIPERACILLIN AND TAZOBACTAM 3375 MG: 3; .375 INJECTION, POWDER, LYOPHILIZED, FOR SOLUTION INTRAVENOUS at 23:54

## 2025-01-10 RX ADMIN — FUROSEMIDE 20 MG: 10 INJECTION, SOLUTION INTRAMUSCULAR; INTRAVENOUS at 02:56

## 2025-01-10 RX ADMIN — BUDESONIDE 500 MCG: 0.5 INHALANT RESPIRATORY (INHALATION) at 19:23

## 2025-01-10 RX ADMIN — METOPROLOL TARTRATE 12.5 MG: 25 TABLET, FILM COATED ORAL at 01:10

## 2025-01-10 RX ADMIN — ONDANSETRON 4 MG: 2 INJECTION INTRAMUSCULAR; INTRAVENOUS at 06:35

## 2025-01-10 RX ADMIN — MUPIROCIN: 20 OINTMENT TOPICAL at 21:03

## 2025-01-10 RX ADMIN — IPRATROPIUM BROMIDE AND ALBUTEROL SULFATE 1 DOSE: 2.5; .5 SOLUTION RESPIRATORY (INHALATION) at 15:13

## 2025-01-10 RX ADMIN — IPRATROPIUM BROMIDE AND ALBUTEROL SULFATE 1 DOSE: 2.5; .5 SOLUTION RESPIRATORY (INHALATION) at 11:01

## 2025-01-10 RX ADMIN — MICONAZOLE NITRATE: 2 OINTMENT TOPICAL at 21:03

## 2025-01-10 RX ADMIN — IPRATROPIUM BROMIDE AND ALBUTEROL SULFATE 1 DOSE: 2.5; .5 SOLUTION RESPIRATORY (INHALATION) at 19:23

## 2025-01-10 RX ADMIN — MICONAZOLE NITRATE: 2 OINTMENT TOPICAL at 16:55

## 2025-01-10 RX ADMIN — MUPIROCIN: 20 OINTMENT TOPICAL at 08:56

## 2025-01-10 RX ADMIN — PIPERACILLIN AND TAZOBACTAM 3375 MG: 3; .375 INJECTION, POWDER, LYOPHILIZED, FOR SOLUTION INTRAVENOUS at 08:58

## 2025-01-10 RX ADMIN — LORAZEPAM 0.5 MG: 0.5 TABLET ORAL at 00:51

## 2025-01-10 RX ADMIN — IPRATROPIUM BROMIDE AND ALBUTEROL SULFATE 1 DOSE: 2.5; .5 SOLUTION RESPIRATORY (INHALATION) at 07:27

## 2025-01-10 NOTE — FLOWSHEET NOTE
Pt cont to desat. In the 60's and O2 bumped up to 10 L HF. Rapid Response called. Charge RN and Supervisor notified. Pt to be transferred to ICU. Pt cont to decline.

## 2025-01-10 NOTE — FLOWSHEET NOTE
RT placed pt on bi-pap, but pt could not tolerate. O2 placed back on pt. Pt sounds wet at this time. Fluids stopped. Pt vomited earlier and was changed. Pt sounds like she may have aspirated. Will notify MD. 3L high flow.

## 2025-01-10 NOTE — FLOWSHEET NOTE
Dr. Everett is not taking calls. He is at another hospital. Hospitalist perfect served. Pt's HR cont to be elevated along with her anxiety.

## 2025-01-10 NOTE — SIGNIFICANT EVENT
Pt continues with increased oxygen requirements and labored respirations. Did not tolerate bipap due to emesis. Nocturnist at bedside-transfer to ICU for closer monitoring. Noris Pugh RN Clinical

## 2025-01-10 NOTE — FLOWSHEET NOTE
Hosptilist at  evaluating pt. New orders:  Stat CXR and Stat Lasix 20 mg IV. Hold fluids for now. Pt  has rhonchi at this time. O2 at 5L HF.HR remains elevated.

## 2025-01-10 NOTE — WOUND CARE
Photo reviewed, spoke with staff RN who states pt has fungal rash, no open areas.  She is incontinent.  Triple Paste Antifungal cream ordered.  Call for worsening.

## 2025-01-10 NOTE — CARE COORDINATION
Call from Paco at [facility]. Paco performed on site yesterday evening. Able to accept pt at DC. Paco is working with family on DC plan from SNF due to family expressing they would like for pt to go LTC somewhere.

## 2025-01-10 NOTE — CARE COORDINATION
Patient accepted at [facility]. Patient is straight Medicare. Can discharge to [facility] when medically stable. No precert needed.

## 2025-01-10 NOTE — FLOWSHEET NOTE
CXR prelim result:      Status: Preliminary result       Visible to patient: No (not released)       Next appt: None    0 Result Notes  Details    Reading Physician Reading Date Result Priority   Christiano Hernandez MD  [phone] 1/10/2025      Narrative & Impression  IMPRESSION:  Diffuse interstitial prominence, mildly worsened. This could be seen with  pulmonary edema or atypical/viral infection.     This result has not been signed. Information might be incomplete.           Exam Ended: 01/10/25 02:43 EST      MD was notified per perfect serve.

## 2025-01-11 ENCOUNTER — APPOINTMENT (OUTPATIENT)
Dept: GENERAL RADIOLOGY | Age: 85
DRG: 444 | End: 2025-01-11
Payer: MEDICARE

## 2025-01-11 LAB
ANION GAP SERPL CALCULATED.3IONS-SCNC: 4 MMOL/L (ref 3–16)
ANION GAP SERPL CALCULATED.3IONS-SCNC: 6 MMOL/L (ref 3–16)
BASE EXCESS BLDV CALC-SCNC: 4.4 MMOL/L (ref -3–3)
BASOPHILS # BLD: 0 K/UL (ref 0–0.2)
BASOPHILS NFR BLD: 0.1 %
BUN SERPL-MCNC: 10 MG/DL (ref 7–20)
BUN SERPL-MCNC: 11 MG/DL (ref 7–20)
CALCIUM SERPL-MCNC: 9 MG/DL (ref 8.3–10.6)
CALCIUM SERPL-MCNC: 9.3 MG/DL (ref 8.3–10.6)
CHLORIDE SERPL-SCNC: 108 MMOL/L (ref 99–110)
CHLORIDE SERPL-SCNC: 110 MMOL/L (ref 99–110)
CO2 BLDV-SCNC: 32 MMOL/L
CO2 SERPL-SCNC: 27 MMOL/L (ref 21–32)
CO2 SERPL-SCNC: 31 MMOL/L (ref 21–32)
COHGB MFR BLDV: 1 % (ref 0–1.5)
CREAT SERPL-MCNC: 0.8 MG/DL (ref 0.6–1.2)
CREAT SERPL-MCNC: 0.9 MG/DL (ref 0.6–1.2)
DEPRECATED RDW RBC AUTO: 16.6 % (ref 12.4–15.4)
EOSINOPHIL # BLD: 0.1 K/UL (ref 0–0.6)
EOSINOPHIL NFR BLD: 0.3 %
FERRITIN SERPL IA-MCNC: 82.8 NG/ML (ref 15–150)
GFR SERPLBLD CREATININE-BSD FMLA CKD-EPI: 63 ML/MIN/{1.73_M2}
GFR SERPLBLD CREATININE-BSD FMLA CKD-EPI: 73 ML/MIN/{1.73_M2}
GLUCOSE BLD-MCNC: 100 MG/DL (ref 70–99)
GLUCOSE BLD-MCNC: 110 MG/DL (ref 70–99)
GLUCOSE BLD-MCNC: 123 MG/DL (ref 70–99)
GLUCOSE BLD-MCNC: 124 MG/DL (ref 70–99)
GLUCOSE SERPL-MCNC: 114 MG/DL (ref 70–99)
GLUCOSE SERPL-MCNC: 127 MG/DL (ref 70–99)
HCO3 BLDV-SCNC: 30.9 MMOL/L (ref 23–29)
HCT VFR BLD AUTO: 25 % (ref 36–48)
HGB BLD-MCNC: 7.6 G/DL (ref 12–16)
IRON SATN MFR SERPL: 6 % (ref 15–50)
IRON SERPL-MCNC: 10 UG/DL (ref 37–145)
LYMPHOCYTES # BLD: 0.9 K/UL (ref 1–5.1)
LYMPHOCYTES NFR BLD: 4.7 %
MCH RBC QN AUTO: 28.7 PG (ref 26–34)
MCHC RBC AUTO-ENTMCNC: 30.2 G/DL (ref 31–36)
MCV RBC AUTO: 95.3 FL (ref 80–100)
METHGB MFR BLDV: 0.3 %
MONOCYTES # BLD: 0.9 K/UL (ref 0–1.3)
MONOCYTES NFR BLD: 4.6 %
NEUTROPHILS # BLD: 17.9 K/UL (ref 1.7–7.7)
NEUTROPHILS NFR BLD: 90.3 %
O2 CT VFR BLDV CALC: 9 VOL %
O2 THERAPY: ABNORMAL
PCO2 BLDV: 58.3 MMHG (ref 40–50)
PERFORMED ON: ABNORMAL
PH BLDV: 7.34 [PH] (ref 7.35–7.45)
PLATELET # BLD AUTO: 300 K/UL (ref 135–450)
PMV BLD AUTO: 7.9 FL (ref 5–10.5)
PO2 BLDV: 44.3 MMHG (ref 25–40)
POTASSIUM SERPL-SCNC: 3.7 MMOL/L (ref 3.5–5.1)
POTASSIUM SERPL-SCNC: 3.9 MMOL/L (ref 3.5–5.1)
RBC # BLD AUTO: 2.63 M/UL (ref 4–5.2)
REASON FOR REJECTION: NORMAL
REJECTED TEST: NORMAL
SAO2 % BLDV: 76 %
SODIUM SERPL-SCNC: 143 MMOL/L (ref 136–145)
SODIUM SERPL-SCNC: 143 MMOL/L (ref 136–145)
TIBC SERPL-MCNC: 156 UG/DL (ref 260–445)
WBC # BLD AUTO: 19.8 K/UL (ref 4–11)

## 2025-01-11 PROCEDURE — 83550 IRON BINDING TEST: CPT

## 2025-01-11 PROCEDURE — 6370000000 HC RX 637 (ALT 250 FOR IP): Performed by: INTERNAL MEDICINE

## 2025-01-11 PROCEDURE — 2580000003 HC RX 258: Performed by: INTERNAL MEDICINE

## 2025-01-11 PROCEDURE — 6360000002 HC RX W HCPCS: Performed by: INTERNAL MEDICINE

## 2025-01-11 PROCEDURE — 74018 RADEX ABDOMEN 1 VIEW: CPT

## 2025-01-11 PROCEDURE — 82728 ASSAY OF FERRITIN: CPT

## 2025-01-11 PROCEDURE — 2000000000 HC ICU R&B

## 2025-01-11 PROCEDURE — 2500000003 HC RX 250 WO HCPCS: Performed by: STUDENT IN AN ORGANIZED HEALTH CARE EDUCATION/TRAINING PROGRAM

## 2025-01-11 PROCEDURE — 94640 AIRWAY INHALATION TREATMENT: CPT

## 2025-01-11 PROCEDURE — 6360000002 HC RX W HCPCS: Performed by: STUDENT IN AN ORGANIZED HEALTH CARE EDUCATION/TRAINING PROGRAM

## 2025-01-11 PROCEDURE — 94660 CPAP INITIATION&MGMT: CPT

## 2025-01-11 PROCEDURE — 6360000002 HC RX W HCPCS: Performed by: NURSE PRACTITIONER

## 2025-01-11 PROCEDURE — 2500000003 HC RX 250 WO HCPCS: Performed by: INTERNAL MEDICINE

## 2025-01-11 PROCEDURE — 2700000000 HC OXYGEN THERAPY PER DAY

## 2025-01-11 PROCEDURE — 99291 CRITICAL CARE FIRST HOUR: CPT | Performed by: INTERNAL MEDICINE

## 2025-01-11 PROCEDURE — 71045 X-RAY EXAM CHEST 1 VIEW: CPT

## 2025-01-11 PROCEDURE — 6370000000 HC RX 637 (ALT 250 FOR IP): Performed by: NURSE PRACTITIONER

## 2025-01-11 PROCEDURE — 80048 BASIC METABOLIC PNL TOTAL CA: CPT

## 2025-01-11 PROCEDURE — 94761 N-INVAS EAR/PLS OXIMETRY MLT: CPT

## 2025-01-11 PROCEDURE — 82803 BLOOD GASES ANY COMBINATION: CPT

## 2025-01-11 PROCEDURE — 99233 SBSQ HOSP IP/OBS HIGH 50: CPT | Performed by: INTERNAL MEDICINE

## 2025-01-11 PROCEDURE — 83540 ASSAY OF IRON: CPT

## 2025-01-11 PROCEDURE — 36415 COLL VENOUS BLD VENIPUNCTURE: CPT

## 2025-01-11 PROCEDURE — 85025 COMPLETE CBC W/AUTO DIFF WBC: CPT

## 2025-01-11 RX ORDER — DEXTROSE MONOHYDRATE AND SODIUM CHLORIDE 5; .45 G/100ML; G/100ML
INJECTION, SOLUTION INTRAVENOUS CONTINUOUS
Status: DISCONTINUED | OUTPATIENT
Start: 2025-01-11 | End: 2025-01-12

## 2025-01-11 RX ORDER — BUSPIRONE HYDROCHLORIDE 7.5 MG/1
7.5 TABLET ORAL 2 TIMES DAILY
Status: DISCONTINUED | OUTPATIENT
Start: 2025-01-11 | End: 2025-01-27

## 2025-01-11 RX ORDER — LORAZEPAM 2 MG/ML
0.5 INJECTION INTRAMUSCULAR EVERY 8 HOURS PRN
Status: DISCONTINUED | OUTPATIENT
Start: 2025-01-11 | End: 2025-01-26

## 2025-01-11 RX ORDER — POTASSIUM CHLORIDE 750 MG/1
40 TABLET, EXTENDED RELEASE ORAL DAILY
Status: DISCONTINUED | OUTPATIENT
Start: 2025-01-11 | End: 2025-01-11

## 2025-01-11 RX ADMIN — ONDANSETRON 4 MG: 2 INJECTION INTRAMUSCULAR; INTRAVENOUS at 06:45

## 2025-01-11 RX ADMIN — ATORVASTATIN CALCIUM 20 MG: 10 TABLET, FILM COATED ORAL at 21:46

## 2025-01-11 RX ADMIN — PIPERACILLIN AND TAZOBACTAM 3375 MG: 3; .375 INJECTION, POWDER, LYOPHILIZED, FOR SOLUTION INTRAVENOUS at 08:44

## 2025-01-11 RX ADMIN — METOPROLOL TARTRATE 5 MG: 5 INJECTION, SOLUTION INTRAVENOUS at 05:38

## 2025-01-11 RX ADMIN — POTASSIUM CHLORIDE 40 MEQ: 750 TABLET, EXTENDED RELEASE ORAL at 09:58

## 2025-01-11 RX ADMIN — IPRATROPIUM BROMIDE AND ALBUTEROL SULFATE 1 DOSE: 2.5; .5 SOLUTION RESPIRATORY (INHALATION) at 16:19

## 2025-01-11 RX ADMIN — PANTOPRAZOLE SODIUM 40 MG: 40 TABLET, DELAYED RELEASE ORAL at 21:46

## 2025-01-11 RX ADMIN — IPRATROPIUM BROMIDE AND ALBUTEROL SULFATE 1 DOSE: 2.5; .5 SOLUTION RESPIRATORY (INHALATION) at 11:54

## 2025-01-11 RX ADMIN — SODIUM CHLORIDE, PRESERVATIVE FREE 10 ML: 5 INJECTION INTRAVENOUS at 21:07

## 2025-01-11 RX ADMIN — MUPIROCIN: 20 OINTMENT TOPICAL at 21:07

## 2025-01-11 RX ADMIN — IPRATROPIUM BROMIDE AND ALBUTEROL SULFATE 1 DOSE: 2.5; .5 SOLUTION RESPIRATORY (INHALATION) at 20:07

## 2025-01-11 RX ADMIN — MICONAZOLE NITRATE: 2 OINTMENT TOPICAL at 10:00

## 2025-01-11 RX ADMIN — MUPIROCIN: 20 OINTMENT TOPICAL at 08:46

## 2025-01-11 RX ADMIN — IPRATROPIUM BROMIDE AND ALBUTEROL SULFATE 1 DOSE: 2.5; .5 SOLUTION RESPIRATORY (INHALATION) at 08:44

## 2025-01-11 RX ADMIN — PREDNISONE 40 MG: 20 TABLET ORAL at 10:03

## 2025-01-11 RX ADMIN — LORAZEPAM 0.5 MG: 2 INJECTION INTRAMUSCULAR; INTRAVENOUS at 22:32

## 2025-01-11 RX ADMIN — BUSPIRONE HYDROCHLORIDE 7.5 MG: 7.5 TABLET ORAL at 21:45

## 2025-01-11 RX ADMIN — MICONAZOLE NITRATE: 2 OINTMENT TOPICAL at 21:07

## 2025-01-11 RX ADMIN — APIXABAN 2.5 MG: 5 TABLET, FILM COATED ORAL at 09:59

## 2025-01-11 RX ADMIN — METOPROLOL TARTRATE 12.5 MG: 25 TABLET, FILM COATED ORAL at 21:45

## 2025-01-11 RX ADMIN — BUDESONIDE 500 MCG: 0.5 INHALANT RESPIRATORY (INHALATION) at 08:44

## 2025-01-11 RX ADMIN — PIPERACILLIN AND TAZOBACTAM 3375 MG: 3; .375 INJECTION, POWDER, LYOPHILIZED, FOR SOLUTION INTRAVENOUS at 18:54

## 2025-01-11 RX ADMIN — GUAIFENESIN 600 MG: 600 TABLET, EXTENDED RELEASE ORAL at 21:45

## 2025-01-11 RX ADMIN — LORAZEPAM 0.5 MG: 2 INJECTION INTRAMUSCULAR; INTRAVENOUS at 12:38

## 2025-01-11 RX ADMIN — BUDESONIDE 500 MCG: 0.5 INHALANT RESPIRATORY (INHALATION) at 20:07

## 2025-01-11 RX ADMIN — ONDANSETRON 4 MG: 2 INJECTION INTRAMUSCULAR; INTRAVENOUS at 12:39

## 2025-01-11 RX ADMIN — MAGNESIUM SULFATE HEPTAHYDRATE 2000 MG: 40 INJECTION, SOLUTION INTRAVENOUS at 06:55

## 2025-01-11 RX ADMIN — APIXABAN 2.5 MG: 5 TABLET, FILM COATED ORAL at 21:46

## 2025-01-11 RX ADMIN — METOPROLOL TARTRATE 12.5 MG: 25 TABLET, FILM COATED ORAL at 09:58

## 2025-01-12 LAB
ANION GAP SERPL CALCULATED.3IONS-SCNC: 5 MMOL/L (ref 3–16)
ANION GAP SERPL CALCULATED.3IONS-SCNC: 6 MMOL/L (ref 3–16)
ANION GAP SERPL CALCULATED.3IONS-SCNC: 7 MMOL/L (ref 3–16)
BASE EXCESS BLDV CALC-SCNC: 1.8 MMOL/L (ref -3–3)
BASOPHILS # BLD: 0.1 K/UL (ref 0–0.2)
BASOPHILS NFR BLD: 0.4 %
BUN SERPL-MCNC: 10 MG/DL (ref 7–20)
BUN SERPL-MCNC: 11 MG/DL (ref 7–20)
BUN SERPL-MCNC: 9 MG/DL (ref 7–20)
CALCIUM SERPL-MCNC: 9 MG/DL (ref 8.3–10.6)
CALCIUM SERPL-MCNC: 9.2 MG/DL (ref 8.3–10.6)
CALCIUM SERPL-MCNC: 9.3 MG/DL (ref 8.3–10.6)
CHLORIDE SERPL-SCNC: 109 MMOL/L (ref 99–110)
CHLORIDE SERPL-SCNC: 111 MMOL/L (ref 99–110)
CHLORIDE SERPL-SCNC: 111 MMOL/L (ref 99–110)
CO2 BLDV-SCNC: 29 MMOL/L
CO2 SERPL-SCNC: 28 MMOL/L (ref 21–32)
CO2 SERPL-SCNC: 29 MMOL/L (ref 21–32)
CO2 SERPL-SCNC: 29 MMOL/L (ref 21–32)
COHGB MFR BLDV: 1.4 % (ref 0–1.5)
CREAT SERPL-MCNC: 0.8 MG/DL (ref 0.6–1.2)
CREAT SERPL-MCNC: 0.9 MG/DL (ref 0.6–1.2)
CREAT SERPL-MCNC: 0.9 MG/DL (ref 0.6–1.2)
DEPRECATED RDW RBC AUTO: 16.4 % (ref 12.4–15.4)
EOSINOPHIL # BLD: 0 K/UL (ref 0–0.6)
EOSINOPHIL NFR BLD: 0.2 %
GFR SERPLBLD CREATININE-BSD FMLA CKD-EPI: 63 ML/MIN/{1.73_M2}
GFR SERPLBLD CREATININE-BSD FMLA CKD-EPI: 63 ML/MIN/{1.73_M2}
GFR SERPLBLD CREATININE-BSD FMLA CKD-EPI: 73 ML/MIN/{1.73_M2}
GLUCOSE BLD-MCNC: 114 MG/DL (ref 70–99)
GLUCOSE BLD-MCNC: 123 MG/DL (ref 70–99)
GLUCOSE BLD-MCNC: 123 MG/DL (ref 70–99)
GLUCOSE BLD-MCNC: 134 MG/DL (ref 70–99)
GLUCOSE BLD-MCNC: 94 MG/DL (ref 70–99)
GLUCOSE SERPL-MCNC: 108 MG/DL (ref 70–99)
GLUCOSE SERPL-MCNC: 117 MG/DL (ref 70–99)
GLUCOSE SERPL-MCNC: 119 MG/DL (ref 70–99)
HCO3 BLDV-SCNC: 27.8 MMOL/L (ref 23–29)
HCT VFR BLD AUTO: 23.3 % (ref 36–48)
HEMOCCULT STL QL: ABNORMAL
HGB BLD-MCNC: 7.1 G/DL (ref 12–16)
LYMPHOCYTES # BLD: 0.8 K/UL (ref 1–5.1)
LYMPHOCYTES NFR BLD: 5.1 %
MCH RBC QN AUTO: 28.7 PG (ref 26–34)
MCHC RBC AUTO-ENTMCNC: 30.3 G/DL (ref 31–36)
MCV RBC AUTO: 94.8 FL (ref 80–100)
METHGB MFR BLDV: 0.3 %
MONOCYTES # BLD: 0.6 K/UL (ref 0–1.3)
MONOCYTES NFR BLD: 3.8 %
NEUTROPHILS # BLD: 14.2 K/UL (ref 1.7–7.7)
NEUTROPHILS NFR BLD: 90.5 %
O2 CT VFR BLDV CALC: 11 VOL %
O2 THERAPY: ABNORMAL
PCO2 BLDV: 51.8 MMHG (ref 40–50)
PERFORMED ON: ABNORMAL
PERFORMED ON: NORMAL
PH BLDV: 7.35 [PH] (ref 7.35–7.45)
PLATELET # BLD AUTO: 266 K/UL (ref 135–450)
PMV BLD AUTO: 7.8 FL (ref 5–10.5)
PO2 BLDV: 145.9 MMHG (ref 25–40)
POTASSIUM SERPL-SCNC: 3.9 MMOL/L (ref 3.5–5.1)
POTASSIUM SERPL-SCNC: 4.1 MMOL/L (ref 3.5–5.1)
POTASSIUM SERPL-SCNC: 4.6 MMOL/L (ref 3.5–5.1)
RBC # BLD AUTO: 2.46 M/UL (ref 4–5.2)
SAO2 % BLDV: 99 %
SODIUM SERPL-SCNC: 142 MMOL/L (ref 136–145)
SODIUM SERPL-SCNC: 146 MMOL/L (ref 136–145)
SODIUM SERPL-SCNC: 147 MMOL/L (ref 136–145)
WBC # BLD AUTO: 15.7 K/UL (ref 4–11)

## 2025-01-12 PROCEDURE — 99233 SBSQ HOSP IP/OBS HIGH 50: CPT | Performed by: INTERNAL MEDICINE

## 2025-01-12 PROCEDURE — 85025 COMPLETE CBC W/AUTO DIFF WBC: CPT

## 2025-01-12 PROCEDURE — 94660 CPAP INITIATION&MGMT: CPT

## 2025-01-12 PROCEDURE — 2700000000 HC OXYGEN THERAPY PER DAY

## 2025-01-12 PROCEDURE — 2580000003 HC RX 258: Performed by: INTERNAL MEDICINE

## 2025-01-12 PROCEDURE — 6370000000 HC RX 637 (ALT 250 FOR IP): Performed by: INTERNAL MEDICINE

## 2025-01-12 PROCEDURE — 2500000003 HC RX 250 WO HCPCS: Performed by: STUDENT IN AN ORGANIZED HEALTH CARE EDUCATION/TRAINING PROGRAM

## 2025-01-12 PROCEDURE — 2000000000 HC ICU R&B

## 2025-01-12 PROCEDURE — 80048 BASIC METABOLIC PNL TOTAL CA: CPT

## 2025-01-12 PROCEDURE — 94640 AIRWAY INHALATION TREATMENT: CPT

## 2025-01-12 PROCEDURE — 97162 PT EVAL MOD COMPLEX 30 MIN: CPT

## 2025-01-12 PROCEDURE — 6370000000 HC RX 637 (ALT 250 FOR IP): Performed by: NURSE PRACTITIONER

## 2025-01-12 PROCEDURE — 97168 OT RE-EVAL EST PLAN CARE: CPT

## 2025-01-12 PROCEDURE — 94761 N-INVAS EAR/PLS OXIMETRY MLT: CPT

## 2025-01-12 PROCEDURE — 6370000000 HC RX 637 (ALT 250 FOR IP): Performed by: STUDENT IN AN ORGANIZED HEALTH CARE EDUCATION/TRAINING PROGRAM

## 2025-01-12 PROCEDURE — 6360000002 HC RX W HCPCS: Performed by: NURSE PRACTITIONER

## 2025-01-12 PROCEDURE — 97530 THERAPEUTIC ACTIVITIES: CPT

## 2025-01-12 PROCEDURE — 82803 BLOOD GASES ANY COMBINATION: CPT

## 2025-01-12 PROCEDURE — 6360000002 HC RX W HCPCS: Performed by: INTERNAL MEDICINE

## 2025-01-12 PROCEDURE — 36415 COLL VENOUS BLD VENIPUNCTURE: CPT

## 2025-01-12 PROCEDURE — 82270 OCCULT BLOOD FECES: CPT

## 2025-01-12 RX ORDER — PREDNISONE 20 MG/1
20 TABLET ORAL DAILY
Status: DISCONTINUED | OUTPATIENT
Start: 2025-01-12 | End: 2025-01-14

## 2025-01-12 RX ORDER — DEXTROSE MONOHYDRATE 50 MG/ML
INJECTION, SOLUTION INTRAVENOUS CONTINUOUS
Status: DISCONTINUED | OUTPATIENT
Start: 2025-01-12 | End: 2025-01-15

## 2025-01-12 RX ADMIN — BUDESONIDE 500 MCG: 0.5 INHALANT RESPIRATORY (INHALATION) at 20:01

## 2025-01-12 RX ADMIN — ATORVASTATIN CALCIUM 20 MG: 10 TABLET, FILM COATED ORAL at 20:29

## 2025-01-12 RX ADMIN — PIPERACILLIN AND TAZOBACTAM 3375 MG: 3; .375 INJECTION, POWDER, LYOPHILIZED, FOR SOLUTION INTRAVENOUS at 00:15

## 2025-01-12 RX ADMIN — LEVOTHYROXINE SODIUM 25 MCG: 25 TABLET ORAL at 06:45

## 2025-01-12 RX ADMIN — APIXABAN 2.5 MG: 5 TABLET, FILM COATED ORAL at 20:29

## 2025-01-12 RX ADMIN — DEXTROSE: 5 SOLUTION INTRAVENOUS at 10:06

## 2025-01-12 RX ADMIN — IPRATROPIUM BROMIDE AND ALBUTEROL SULFATE 1 DOSE: 2.5; .5 SOLUTION RESPIRATORY (INHALATION) at 12:23

## 2025-01-12 RX ADMIN — Medication 400 MG: at 10:06

## 2025-01-12 RX ADMIN — METOPROLOL TARTRATE 12.5 MG: 25 TABLET, FILM COATED ORAL at 20:29

## 2025-01-12 RX ADMIN — PIPERACILLIN AND TAZOBACTAM 3375 MG: 3; .375 INJECTION, POWDER, LYOPHILIZED, FOR SOLUTION INTRAVENOUS at 17:15

## 2025-01-12 RX ADMIN — APIXABAN 2.5 MG: 5 TABLET, FILM COATED ORAL at 10:07

## 2025-01-12 RX ADMIN — GUAIFENESIN 600 MG: 600 TABLET, EXTENDED RELEASE ORAL at 20:30

## 2025-01-12 RX ADMIN — BUDESONIDE 500 MCG: 0.5 INHALANT RESPIRATORY (INHALATION) at 08:14

## 2025-01-12 RX ADMIN — DEXTROSE AND SODIUM CHLORIDE: 5; .45 INJECTION, SOLUTION INTRAVENOUS at 05:04

## 2025-01-12 RX ADMIN — MUPIROCIN: 20 OINTMENT TOPICAL at 10:06

## 2025-01-12 RX ADMIN — IPRATROPIUM BROMIDE AND ALBUTEROL SULFATE 1 DOSE: 2.5; .5 SOLUTION RESPIRATORY (INHALATION) at 20:01

## 2025-01-12 RX ADMIN — MUPIROCIN: 20 OINTMENT TOPICAL at 20:30

## 2025-01-12 RX ADMIN — BUSPIRONE HYDROCHLORIDE 7.5 MG: 7.5 TABLET ORAL at 10:06

## 2025-01-12 RX ADMIN — MICONAZOLE NITRATE: 2 OINTMENT TOPICAL at 11:28

## 2025-01-12 RX ADMIN — ACETAMINOPHEN 650 MG: 325 TABLET ORAL at 20:29

## 2025-01-12 RX ADMIN — ESCITALOPRAM OXALATE 10 MG: 10 TABLET ORAL at 10:06

## 2025-01-12 RX ADMIN — PANTOPRAZOLE SODIUM 40 MG: 40 TABLET, DELAYED RELEASE ORAL at 10:06

## 2025-01-12 RX ADMIN — PREDNISONE 20 MG: 20 TABLET ORAL at 11:28

## 2025-01-12 RX ADMIN — PANTOPRAZOLE SODIUM 40 MG: 40 TABLET, DELAYED RELEASE ORAL at 20:29

## 2025-01-12 RX ADMIN — PIPERACILLIN AND TAZOBACTAM 3375 MG: 3; .375 INJECTION, POWDER, LYOPHILIZED, FOR SOLUTION INTRAVENOUS at 10:05

## 2025-01-12 RX ADMIN — METOPROLOL TARTRATE 12.5 MG: 25 TABLET, FILM COATED ORAL at 10:06

## 2025-01-12 RX ADMIN — SODIUM CHLORIDE, PRESERVATIVE FREE 10 ML: 5 INJECTION INTRAVENOUS at 20:30

## 2025-01-12 RX ADMIN — IPRATROPIUM BROMIDE AND ALBUTEROL SULFATE 1 DOSE: 2.5; .5 SOLUTION RESPIRATORY (INHALATION) at 08:14

## 2025-01-12 RX ADMIN — IPRATROPIUM BROMIDE AND ALBUTEROL SULFATE 1 DOSE: 2.5; .5 SOLUTION RESPIRATORY (INHALATION) at 16:19

## 2025-01-12 RX ADMIN — MICONAZOLE NITRATE: 2 OINTMENT TOPICAL at 20:30

## 2025-01-12 RX ADMIN — BUSPIRONE HYDROCHLORIDE 7.5 MG: 7.5 TABLET ORAL at 20:30

## 2025-01-12 ASSESSMENT — PAIN DESCRIPTION - ORIENTATION: ORIENTATION: LEFT

## 2025-01-12 ASSESSMENT — PAIN SCALES - GENERAL: PAINLEVEL_OUTOF10: 4

## 2025-01-12 ASSESSMENT — PAIN DESCRIPTION - LOCATION: LOCATION: NOSE

## 2025-01-12 ASSESSMENT — PAIN DESCRIPTION - DESCRIPTORS: DESCRIPTORS: ACHING

## 2025-01-13 PROBLEM — K92.1 BLACK TARRY STOOLS: Status: ACTIVE | Noted: 2025-01-13

## 2025-01-13 PROBLEM — E87.8 DISORDER OF ELECTROLYTES: Status: ACTIVE | Noted: 2025-01-13

## 2025-01-13 LAB
ABO + RH BLD: NORMAL
ANION GAP SERPL CALCULATED.3IONS-SCNC: 5 MMOL/L (ref 3–16)
ANION GAP SERPL CALCULATED.3IONS-SCNC: 6 MMOL/L (ref 3–16)
ANION GAP SERPL CALCULATED.3IONS-SCNC: 6 MMOL/L (ref 3–16)
ANTIBODY SCREEN: NORMAL
BASE EXCESS BLDV CALC-SCNC: 3.7 MMOL/L (ref -3–3)
BASOPHILS # BLD: 0 K/UL (ref 0–0.2)
BASOPHILS NFR BLD: 0.1 %
BLOOD BANK DISPENSE STATUS: NORMAL
BLOOD BANK PRODUCT CODE: NORMAL
BPU ID: NORMAL
BUN SERPL-MCNC: 8 MG/DL (ref 7–20)
BUN SERPL-MCNC: 9 MG/DL (ref 7–20)
BUN SERPL-MCNC: 9 MG/DL (ref 7–20)
CALCIUM SERPL-MCNC: 8.9 MG/DL (ref 8.3–10.6)
CALCIUM SERPL-MCNC: 9.1 MG/DL (ref 8.3–10.6)
CALCIUM SERPL-MCNC: 9.2 MG/DL (ref 8.3–10.6)
CHLORIDE SERPL-SCNC: 105 MMOL/L (ref 99–110)
CHLORIDE SERPL-SCNC: 109 MMOL/L (ref 99–110)
CHLORIDE SERPL-SCNC: 110 MMOL/L (ref 99–110)
CO2 BLDV-SCNC: 33 MMOL/L
CO2 SERPL-SCNC: 27 MMOL/L (ref 21–32)
CO2 SERPL-SCNC: 30 MMOL/L (ref 21–32)
CO2 SERPL-SCNC: 32 MMOL/L (ref 21–32)
COHGB MFR BLDV: 1.4 % (ref 0–1.5)
CREAT SERPL-MCNC: 0.8 MG/DL (ref 0.6–1.2)
CREAT SERPL-MCNC: 0.9 MG/DL (ref 0.6–1.2)
CREAT SERPL-MCNC: 0.9 MG/DL (ref 0.6–1.2)
DEPRECATED RDW RBC AUTO: 16.2 % (ref 12.4–15.4)
DESCRIPTION BLOOD BANK: NORMAL
EOSINOPHIL # BLD: 0.1 K/UL (ref 0–0.6)
EOSINOPHIL NFR BLD: 0.6 %
GFR SERPLBLD CREATININE-BSD FMLA CKD-EPI: 63 ML/MIN/{1.73_M2}
GFR SERPLBLD CREATININE-BSD FMLA CKD-EPI: 63 ML/MIN/{1.73_M2}
GFR SERPLBLD CREATININE-BSD FMLA CKD-EPI: 73 ML/MIN/{1.73_M2}
GLUCOSE BLD-MCNC: 103 MG/DL (ref 70–99)
GLUCOSE BLD-MCNC: 112 MG/DL (ref 70–99)
GLUCOSE BLD-MCNC: 116 MG/DL (ref 70–99)
GLUCOSE BLD-MCNC: 124 MG/DL (ref 70–99)
GLUCOSE BLD-MCNC: 93 MG/DL (ref 70–99)
GLUCOSE BLD-MCNC: 99 MG/DL (ref 70–99)
GLUCOSE SERPL-MCNC: 102 MG/DL (ref 70–99)
GLUCOSE SERPL-MCNC: 103 MG/DL (ref 70–99)
GLUCOSE SERPL-MCNC: 103 MG/DL (ref 70–99)
HCO3 BLDV-SCNC: 30.7 MMOL/L (ref 23–29)
HCT VFR BLD AUTO: 22.1 % (ref 36–48)
HCT VFR BLD AUTO: 28.8 % (ref 36–48)
HCT VFR BLD AUTO: 34.1 % (ref 36–48)
HGB BLD-MCNC: 10.2 G/DL (ref 12–16)
HGB BLD-MCNC: 6.7 G/DL (ref 12–16)
HGB BLD-MCNC: 9 G/DL (ref 12–16)
LYMPHOCYTES # BLD: 0.9 K/UL (ref 1–5.1)
LYMPHOCYTES NFR BLD: 7.8 %
MAGNESIUM SERPL-MCNC: 1.74 MG/DL (ref 1.8–2.4)
MAGNESIUM SERPL-MCNC: 1.75 MG/DL (ref 1.8–2.4)
MCH RBC QN AUTO: 28.6 PG (ref 26–34)
MCHC RBC AUTO-ENTMCNC: 30.5 G/DL (ref 31–36)
MCV RBC AUTO: 93.8 FL (ref 80–100)
METHGB MFR BLDV: 0.3 %
MONOCYTES # BLD: 0.6 K/UL (ref 0–1.3)
MONOCYTES NFR BLD: 5.1 %
NEUTROPHILS # BLD: 9.6 K/UL (ref 1.7–7.7)
NEUTROPHILS NFR BLD: 86.4 %
O2 CT VFR BLDV CALC: 11 VOL %
O2 THERAPY: ABNORMAL
PCO2 BLDV: 63.2 MMHG (ref 40–50)
PERFORMED ON: ABNORMAL
PERFORMED ON: NORMAL
PERFORMED ON: NORMAL
PH BLDV: 7.3 [PH] (ref 7.35–7.45)
PLATELET # BLD AUTO: 248 K/UL (ref 135–450)
PMV BLD AUTO: 7.9 FL (ref 5–10.5)
PO2 BLDV: 160.8 MMHG (ref 25–40)
POTASSIUM SERPL-SCNC: 3.3 MMOL/L (ref 3.5–5.1)
POTASSIUM SERPL-SCNC: 3.3 MMOL/L (ref 3.5–5.1)
POTASSIUM SERPL-SCNC: 3.9 MMOL/L (ref 3.5–5.1)
RBC # BLD AUTO: 2.36 M/UL (ref 4–5.2)
SAO2 % BLDV: 99 %
SODIUM SERPL-SCNC: 141 MMOL/L (ref 136–145)
SODIUM SERPL-SCNC: 143 MMOL/L (ref 136–145)
SODIUM SERPL-SCNC: 146 MMOL/L (ref 136–145)
WBC # BLD AUTO: 11.1 K/UL (ref 4–11)

## 2025-01-13 PROCEDURE — 6370000000 HC RX 637 (ALT 250 FOR IP): Performed by: NURSE PRACTITIONER

## 2025-01-13 PROCEDURE — 36415 COLL VENOUS BLD VENIPUNCTURE: CPT

## 2025-01-13 PROCEDURE — P9016 RBC LEUKOCYTES REDUCED: HCPCS

## 2025-01-13 PROCEDURE — 80048 BASIC METABOLIC PNL TOTAL CA: CPT

## 2025-01-13 PROCEDURE — 85014 HEMATOCRIT: CPT

## 2025-01-13 PROCEDURE — 6360000002 HC RX W HCPCS: Performed by: INTERNAL MEDICINE

## 2025-01-13 PROCEDURE — 6370000000 HC RX 637 (ALT 250 FOR IP): Performed by: INTERNAL MEDICINE

## 2025-01-13 PROCEDURE — 97530 THERAPEUTIC ACTIVITIES: CPT

## 2025-01-13 PROCEDURE — 86901 BLOOD TYPING SEROLOGIC RH(D): CPT

## 2025-01-13 PROCEDURE — 94640 AIRWAY INHALATION TREATMENT: CPT

## 2025-01-13 PROCEDURE — 86923 COMPATIBILITY TEST ELECTRIC: CPT

## 2025-01-13 PROCEDURE — 2580000003 HC RX 258: Performed by: INTERNAL MEDICINE

## 2025-01-13 PROCEDURE — 94660 CPAP INITIATION&MGMT: CPT

## 2025-01-13 PROCEDURE — 6360000002 HC RX W HCPCS: Performed by: NURSE PRACTITIONER

## 2025-01-13 PROCEDURE — 94761 N-INVAS EAR/PLS OXIMETRY MLT: CPT

## 2025-01-13 PROCEDURE — 31720 CLEARANCE OF AIRWAYS: CPT

## 2025-01-13 PROCEDURE — 97110 THERAPEUTIC EXERCISES: CPT

## 2025-01-13 PROCEDURE — 85018 HEMOGLOBIN: CPT

## 2025-01-13 PROCEDURE — 30233N1 TRANSFUSION OF NONAUTOLOGOUS RED BLOOD CELLS INTO PERIPHERAL VEIN, PERCUTANEOUS APPROACH: ICD-10-PCS | Performed by: INTERNAL MEDICINE

## 2025-01-13 PROCEDURE — 83735 ASSAY OF MAGNESIUM: CPT

## 2025-01-13 PROCEDURE — 99233 SBSQ HOSP IP/OBS HIGH 50: CPT | Performed by: INTERNAL MEDICINE

## 2025-01-13 PROCEDURE — 86850 RBC ANTIBODY SCREEN: CPT

## 2025-01-13 PROCEDURE — 82803 BLOOD GASES ANY COMBINATION: CPT

## 2025-01-13 PROCEDURE — 2500000003 HC RX 250 WO HCPCS: Performed by: STUDENT IN AN ORGANIZED HEALTH CARE EDUCATION/TRAINING PROGRAM

## 2025-01-13 PROCEDURE — 86900 BLOOD TYPING SEROLOGIC ABO: CPT

## 2025-01-13 PROCEDURE — 94669 MECHANICAL CHEST WALL OSCILL: CPT

## 2025-01-13 PROCEDURE — 36430 TRANSFUSION BLD/BLD COMPNT: CPT

## 2025-01-13 PROCEDURE — 2700000000 HC OXYGEN THERAPY PER DAY

## 2025-01-13 PROCEDURE — 2000000000 HC ICU R&B

## 2025-01-13 PROCEDURE — 85025 COMPLETE CBC W/AUTO DIFF WBC: CPT

## 2025-01-13 RX ORDER — MAGNESIUM SULFATE 1 G/100ML
1000 INJECTION INTRAVENOUS ONCE
Status: COMPLETED | OUTPATIENT
Start: 2025-01-13 | End: 2025-01-13

## 2025-01-13 RX ORDER — GUAIFENESIN/DEXTROMETHORPHAN 100-10MG/5
5 SYRUP ORAL EVERY 4 HOURS PRN
Status: DISCONTINUED | OUTPATIENT
Start: 2025-01-13 | End: 2025-01-27 | Stop reason: HOSPADM

## 2025-01-13 RX ORDER — SODIUM CHLORIDE 9 MG/ML
INJECTION, SOLUTION INTRAVENOUS PRN
Status: DISCONTINUED | OUTPATIENT
Start: 2025-01-13 | End: 2025-01-27 | Stop reason: HOSPADM

## 2025-01-13 RX ADMIN — SODIUM CHLORIDE, PRESERVATIVE FREE 10 ML: 5 INJECTION INTRAVENOUS at 22:12

## 2025-01-13 RX ADMIN — POTASSIUM BICARBONATE 40 MEQ: 782 TABLET, EFFERVESCENT ORAL at 11:00

## 2025-01-13 RX ADMIN — PIPERACILLIN AND TAZOBACTAM 3375 MG: 3; .375 INJECTION, POWDER, LYOPHILIZED, FOR SOLUTION INTRAVENOUS at 23:58

## 2025-01-13 RX ADMIN — LEVOTHYROXINE SODIUM 25 MCG: 25 TABLET ORAL at 06:26

## 2025-01-13 RX ADMIN — Medication 40 MG: at 11:00

## 2025-01-13 RX ADMIN — IPRATROPIUM BROMIDE AND ALBUTEROL SULFATE 1 DOSE: 2.5; .5 SOLUTION RESPIRATORY (INHALATION) at 11:47

## 2025-01-13 RX ADMIN — METOPROLOL TARTRATE 12.5 MG: 25 TABLET, FILM COATED ORAL at 22:10

## 2025-01-13 RX ADMIN — MICONAZOLE NITRATE: 2 OINTMENT TOPICAL at 22:12

## 2025-01-13 RX ADMIN — BUDESONIDE 500 MCG: 0.5 INHALANT RESPIRATORY (INHALATION) at 08:00

## 2025-01-13 RX ADMIN — PIPERACILLIN AND TAZOBACTAM 3375 MG: 3; .375 INJECTION, POWDER, LYOPHILIZED, FOR SOLUTION INTRAVENOUS at 08:53

## 2025-01-13 RX ADMIN — MICONAZOLE NITRATE: 2 OINTMENT TOPICAL at 08:51

## 2025-01-13 RX ADMIN — IPRATROPIUM BROMIDE AND ALBUTEROL SULFATE 1 DOSE: 2.5; .5 SOLUTION RESPIRATORY (INHALATION) at 15:12

## 2025-01-13 RX ADMIN — IPRATROPIUM BROMIDE AND ALBUTEROL SULFATE 1 DOSE: 2.5; .5 SOLUTION RESPIRATORY (INHALATION) at 08:00

## 2025-01-13 RX ADMIN — MAGNESIUM SULFATE HEPTAHYDRATE 1000 MG: 1 INJECTION, SOLUTION INTRAVENOUS at 13:45

## 2025-01-13 RX ADMIN — BUDESONIDE 500 MCG: 0.5 INHALANT RESPIRATORY (INHALATION) at 20:56

## 2025-01-13 RX ADMIN — BUSPIRONE HYDROCHLORIDE 7.5 MG: 7.5 TABLET ORAL at 08:50

## 2025-01-13 RX ADMIN — PIPERACILLIN AND TAZOBACTAM 3375 MG: 3; .375 INJECTION, POWDER, LYOPHILIZED, FOR SOLUTION INTRAVENOUS at 00:23

## 2025-01-13 RX ADMIN — ATORVASTATIN CALCIUM 20 MG: 10 TABLET, FILM COATED ORAL at 22:10

## 2025-01-13 RX ADMIN — Medication 40 MG: at 22:10

## 2025-01-13 RX ADMIN — PREDNISONE 20 MG: 20 TABLET ORAL at 08:50

## 2025-01-13 RX ADMIN — IPRATROPIUM BROMIDE AND ALBUTEROL SULFATE 1 DOSE: 2.5; .5 SOLUTION RESPIRATORY (INHALATION) at 20:56

## 2025-01-13 RX ADMIN — ESCITALOPRAM OXALATE 10 MG: 10 TABLET ORAL at 08:50

## 2025-01-13 RX ADMIN — DEXTROSE: 5 SOLUTION INTRAVENOUS at 06:25

## 2025-01-13 RX ADMIN — MUPIROCIN: 20 OINTMENT TOPICAL at 22:12

## 2025-01-13 RX ADMIN — PIPERACILLIN AND TAZOBACTAM 3375 MG: 3; .375 INJECTION, POWDER, LYOPHILIZED, FOR SOLUTION INTRAVENOUS at 17:03

## 2025-01-13 RX ADMIN — Medication 400 MG: at 08:50

## 2025-01-13 RX ADMIN — BUSPIRONE HYDROCHLORIDE 7.5 MG: 7.5 TABLET ORAL at 22:10

## 2025-01-13 RX ADMIN — METOPROLOL TARTRATE 12.5 MG: 25 TABLET, FILM COATED ORAL at 08:50

## 2025-01-13 ASSESSMENT — PAIN SCALES - GENERAL: PAINLEVEL_OUTOF10: 0

## 2025-01-13 NOTE — CARE COORDINATION
Spoke with Paco/Stephon of [facility]. They are still okay to accept patient - once NG has been removed or replaced with a peg tube. They are following in [facility].     Patient has an NG. Feeds just started. Is NPO related to swallowing issues. Will need evaluation. Not ready to discharge at this time. PCU patient.

## 2025-01-13 NOTE — CONSENT
Informed Consent for Blood Component Transfusion Note    I have discussed with the patient the rationale for blood component transfusion; its benefits in treating or preventing fatigue, organ damage, or death; and its risk which includes mild transfusion reactions, rare risk of blood borne infection, or more serious but rare reactions. I have discussed the alternatives to transfusion, including the risk and consequences of not receiving transfusion. The patient had an opportunity to ask questions and had agreed to proceed with transfusion of blood components.    Electronically signed by EDWARD CASE MD on 1/13/25 at 7:44 AM EST

## 2025-01-14 ENCOUNTER — TELEPHONE (OUTPATIENT)
Dept: CASE MANAGEMENT | Age: 85
End: 2025-01-14

## 2025-01-14 ENCOUNTER — APPOINTMENT (OUTPATIENT)
Dept: GENERAL RADIOLOGY | Age: 85
DRG: 444 | End: 2025-01-14
Payer: MEDICARE

## 2025-01-14 PROBLEM — J96.12 ACUTE HYPOXIC ON CHRONIC HYPERCAPNIC RESPIRATORY FAILURE: Status: ACTIVE | Noted: 2025-01-14

## 2025-01-14 PROBLEM — J96.01 ACUTE HYPOXIC ON CHRONIC HYPERCAPNIC RESPIRATORY FAILURE: Status: ACTIVE | Noted: 2025-01-14

## 2025-01-14 LAB
ANION GAP SERPL CALCULATED.3IONS-SCNC: 4 MMOL/L (ref 3–16)
BASE EXCESS BLDA CALC-SCNC: -1.1 MMOL/L (ref -3–3)
BASE EXCESS BLDA CALC-SCNC: 0.6 MMOL/L (ref -3–3)
BASE EXCESS BLDA CALC-SCNC: 1 MMOL/L (ref -3–3)
BASE EXCESS BLDV CALC-SCNC: 2.3 MMOL/L (ref -3–3)
BUN SERPL-MCNC: 11 MG/DL (ref 7–20)
CALCIUM SERPL-MCNC: 9.2 MG/DL (ref 8.3–10.6)
CHLORIDE SERPL-SCNC: 108 MMOL/L (ref 99–110)
CO2 BLDA-SCNC: 28.8 MMOL/L
CO2 BLDA-SCNC: 30 MMOL/L
CO2 BLDA-SCNC: 32.5 MMOL/L
CO2 BLDV-SCNC: 32 MMOL/L
CO2 SERPL-SCNC: 32 MMOL/L (ref 21–32)
COHGB MFR BLDA: 0.5 % (ref 0–1.5)
COHGB MFR BLDA: 0.5 % (ref 0–1.5)
COHGB MFR BLDA: 0.9 % (ref 0–1.5)
COHGB MFR BLDV: 1.1 % (ref 0–1.5)
CREAT SERPL-MCNC: 0.9 MG/DL (ref 0.6–1.2)
GFR SERPLBLD CREATININE-BSD FMLA CKD-EPI: 63 ML/MIN/{1.73_M2}
GLUCOSE BLD-MCNC: 127 MG/DL (ref 70–99)
GLUCOSE BLD-MCNC: 140 MG/DL (ref 70–99)
GLUCOSE BLD-MCNC: 165 MG/DL (ref 70–99)
GLUCOSE BLD-MCNC: 166 MG/DL (ref 70–99)
GLUCOSE SERPL-MCNC: 159 MG/DL (ref 70–99)
HCO3 BLDA-SCNC: 26.8 MMOL/L (ref 21–29)
HCO3 BLDA-SCNC: 28.3 MMOL/L (ref 21–29)
HCO3 BLDA-SCNC: 30.1 MMOL/L (ref 21–29)
HCO3 BLDV-SCNC: 30.4 MMOL/L (ref 23–29)
HCT VFR BLD AUTO: 28 % (ref 36–48)
HCT VFR BLD AUTO: 30.6 % (ref 36–48)
HCT VFR BLD AUTO: 33.4 % (ref 36–48)
HCT VFR BLD AUTO: 34.5 % (ref 36–48)
HGB BLD-MCNC: 10.3 G/DL (ref 12–16)
HGB BLD-MCNC: 10.7 G/DL (ref 12–16)
HGB BLD-MCNC: 8.9 G/DL (ref 12–16)
HGB BLD-MCNC: 9.6 G/DL (ref 12–16)
HGB BLDA-MCNC: 10.1 G/DL (ref 12–16)
HGB BLDA-MCNC: 10.1 G/DL (ref 12–16)
HGB BLDA-MCNC: 11.7 G/DL (ref 12–16)
METHGB MFR BLDA: 0.1 %
METHGB MFR BLDA: 0.3 %
METHGB MFR BLDA: 0.3 %
METHGB MFR BLDV: 0.3 %
O2 THERAPY: ABNORMAL
PCO2 BLDA: 57.7 MMHG (ref 35–45)
PCO2 BLDA: 62.9 MMHG (ref 35–45)
PCO2 BLDA: 79.2 MMHG (ref 35–45)
PCO2 BLDV: 67.2 MMHG (ref 40–50)
PERFORMED ON: ABNORMAL
PH BLDA: 7.2 [PH] (ref 7.35–7.45)
PH BLDA: 7.25 [PH] (ref 7.35–7.45)
PH BLDA: 7.31 [PH] (ref 7.35–7.45)
PH BLDV: 7.27 [PH] (ref 7.35–7.45)
PO2 BLDA: 130.3 MMHG (ref 75–108)
PO2 BLDA: 33 MMHG (ref 75–108)
PO2 BLDA: 47 MMHG (ref 75–108)
PO2 BLDV: 116.2 MMHG (ref 25–40)
POTASSIUM SERPL-SCNC: 4.2 MMOL/L (ref 3.5–5.1)
SAO2 % BLDA: 48.6 %
SAO2 % BLDA: 74.9 %
SAO2 % BLDA: 98.3 %
SAO2 % BLDV: 98 %
SODIUM SERPL-SCNC: 144 MMOL/L (ref 136–145)

## 2025-01-14 PROCEDURE — 82803 BLOOD GASES ANY COMBINATION: CPT

## 2025-01-14 PROCEDURE — 6360000002 HC RX W HCPCS: Performed by: INTERNAL MEDICINE

## 2025-01-14 PROCEDURE — 6370000000 HC RX 637 (ALT 250 FOR IP): Performed by: INTERNAL MEDICINE

## 2025-01-14 PROCEDURE — 2580000003 HC RX 258: Performed by: INTERNAL MEDICINE

## 2025-01-14 PROCEDURE — 99291 CRITICAL CARE FIRST HOUR: CPT | Performed by: INTERNAL MEDICINE

## 2025-01-14 PROCEDURE — 2500000003 HC RX 250 WO HCPCS: Performed by: INTERNAL MEDICINE

## 2025-01-14 PROCEDURE — 36415 COLL VENOUS BLD VENIPUNCTURE: CPT

## 2025-01-14 PROCEDURE — 85018 HEMOGLOBIN: CPT

## 2025-01-14 PROCEDURE — 2700000000 HC OXYGEN THERAPY PER DAY

## 2025-01-14 PROCEDURE — 6370000000 HC RX 637 (ALT 250 FOR IP): Performed by: STUDENT IN AN ORGANIZED HEALTH CARE EDUCATION/TRAINING PROGRAM

## 2025-01-14 PROCEDURE — 71045 X-RAY EXAM CHEST 1 VIEW: CPT

## 2025-01-14 PROCEDURE — 94640 AIRWAY INHALATION TREATMENT: CPT

## 2025-01-14 PROCEDURE — 2000000000 HC ICU R&B

## 2025-01-14 PROCEDURE — 85014 HEMATOCRIT: CPT

## 2025-01-14 PROCEDURE — 94660 CPAP INITIATION&MGMT: CPT

## 2025-01-14 PROCEDURE — 99233 SBSQ HOSP IP/OBS HIGH 50: CPT | Performed by: INTERNAL MEDICINE

## 2025-01-14 PROCEDURE — 80048 BASIC METABOLIC PNL TOTAL CA: CPT

## 2025-01-14 PROCEDURE — 36600 WITHDRAWAL OF ARTERIAL BLOOD: CPT

## 2025-01-14 PROCEDURE — 94761 N-INVAS EAR/PLS OXIMETRY MLT: CPT

## 2025-01-14 RX ORDER — FUROSEMIDE 10 MG/ML
40 INJECTION INTRAMUSCULAR; INTRAVENOUS ONCE
Status: COMPLETED | OUTPATIENT
Start: 2025-01-14 | End: 2025-01-14

## 2025-01-14 RX ORDER — DEXMEDETOMIDINE HYDROCHLORIDE 4 UG/ML
.1-1.5 INJECTION, SOLUTION INTRAVENOUS CONTINUOUS
Status: DISCONTINUED | OUTPATIENT
Start: 2025-01-14 | End: 2025-01-19

## 2025-01-14 RX ADMIN — LEVOTHYROXINE SODIUM 25 MCG: 25 TABLET ORAL at 09:59

## 2025-01-14 RX ADMIN — FUROSEMIDE 40 MG: 10 INJECTION, SOLUTION INTRAMUSCULAR; INTRAVENOUS at 13:36

## 2025-01-14 RX ADMIN — IPRATROPIUM BROMIDE AND ALBUTEROL SULFATE 1 DOSE: 2.5; .5 SOLUTION RESPIRATORY (INHALATION) at 15:45

## 2025-01-14 RX ADMIN — SODIUM CHLORIDE, PRESERVATIVE FREE 10 ML: 5 INJECTION INTRAVENOUS at 09:57

## 2025-01-14 RX ADMIN — Medication 40 MG: at 09:57

## 2025-01-14 RX ADMIN — MUPIROCIN: 20 OINTMENT TOPICAL at 09:57

## 2025-01-14 RX ADMIN — ESCITALOPRAM OXALATE 10 MG: 10 TABLET ORAL at 09:58

## 2025-01-14 RX ADMIN — BUSPIRONE HYDROCHLORIDE 7.5 MG: 7.5 TABLET ORAL at 21:34

## 2025-01-14 RX ADMIN — GUAIFENESIN AND DEXTROMETHORPHAN 5 ML: 100; 10 SYRUP ORAL at 21:38

## 2025-01-14 RX ADMIN — Medication 40 MG: at 21:35

## 2025-01-14 RX ADMIN — IPRATROPIUM BROMIDE AND ALBUTEROL SULFATE 1 DOSE: 2.5; .5 SOLUTION RESPIRATORY (INHALATION) at 20:07

## 2025-01-14 RX ADMIN — WATER 40 MG: 1 INJECTION INTRAMUSCULAR; INTRAVENOUS; SUBCUTANEOUS at 13:36

## 2025-01-14 RX ADMIN — MICONAZOLE NITRATE: 2 OINTMENT TOPICAL at 13:37

## 2025-01-14 RX ADMIN — METOPROLOL TARTRATE 12.5 MG: 25 TABLET, FILM COATED ORAL at 09:58

## 2025-01-14 RX ADMIN — IPRATROPIUM BROMIDE AND ALBUTEROL SULFATE 1 DOSE: 2.5; .5 SOLUTION RESPIRATORY (INHALATION) at 11:21

## 2025-01-14 RX ADMIN — ATORVASTATIN CALCIUM 20 MG: 10 TABLET, FILM COATED ORAL at 21:34

## 2025-01-14 RX ADMIN — GUAIFENESIN AND DEXTROMETHORPHAN 5 ML: 100; 10 SYRUP ORAL at 03:48

## 2025-01-14 RX ADMIN — DEXTROSE: 5 SOLUTION INTRAVENOUS at 07:07

## 2025-01-14 RX ADMIN — PIPERACILLIN AND TAZOBACTAM 3375 MG: 3; .375 INJECTION, POWDER, LYOPHILIZED, FOR SOLUTION INTRAVENOUS at 16:09

## 2025-01-14 RX ADMIN — BUSPIRONE HYDROCHLORIDE 7.5 MG: 7.5 TABLET ORAL at 09:58

## 2025-01-14 RX ADMIN — BUDESONIDE 500 MCG: 0.5 INHALANT RESPIRATORY (INHALATION) at 07:31

## 2025-01-14 RX ADMIN — METOPROLOL TARTRATE 12.5 MG: 25 TABLET, FILM COATED ORAL at 21:34

## 2025-01-14 RX ADMIN — MUPIROCIN: 20 OINTMENT TOPICAL at 21:36

## 2025-01-14 RX ADMIN — DEXTROSE: 5 SOLUTION INTRAVENOUS at 12:04

## 2025-01-14 RX ADMIN — WATER 40 MG: 1 INJECTION INTRAMUSCULAR; INTRAVENOUS; SUBCUTANEOUS at 21:35

## 2025-01-14 RX ADMIN — Medication 400 MG: at 09:57

## 2025-01-14 RX ADMIN — BUDESONIDE 500 MCG: 0.5 INHALANT RESPIRATORY (INHALATION) at 20:07

## 2025-01-14 RX ADMIN — IPRATROPIUM BROMIDE AND ALBUTEROL SULFATE 1 DOSE: 2.5; .5 SOLUTION RESPIRATORY (INHALATION) at 07:31

## 2025-01-14 RX ADMIN — PIPERACILLIN AND TAZOBACTAM 3375 MG: 3; .375 INJECTION, POWDER, LYOPHILIZED, FOR SOLUTION INTRAVENOUS at 10:00

## 2025-01-14 RX ADMIN — MICONAZOLE NITRATE: 2 OINTMENT TOPICAL at 21:36

## 2025-01-14 RX ADMIN — PREDNISONE 20 MG: 20 TABLET ORAL at 09:58

## 2025-01-14 RX ADMIN — SODIUM CHLORIDE, PRESERVATIVE FREE 10 ML: 5 INJECTION INTRAVENOUS at 21:35

## 2025-01-14 NOTE — TELEPHONE ENCOUNTER
Imaging report CT Abd 1/3/25 with f/u imaging recommendations sent to [facility] YE(R)  Patient Navigator  Incidentals/Lung Navigation  [email or web address]

## 2025-01-15 LAB
ALBUMIN SERPL-MCNC: 2.3 G/DL (ref 3.4–5)
ANION GAP SERPL CALCULATED.3IONS-SCNC: 5 MMOL/L (ref 3–16)
ANION GAP SERPL CALCULATED.3IONS-SCNC: 7 MMOL/L (ref 3–16)
BASE EXCESS BLDV CALC-SCNC: 5.4 MMOL/L (ref -3–3)
BUN SERPL-MCNC: 12 MG/DL (ref 7–20)
BUN SERPL-MCNC: 14 MG/DL (ref 7–20)
CALCIUM SERPL-MCNC: 8.9 MG/DL (ref 8.3–10.6)
CALCIUM SERPL-MCNC: 9.2 MG/DL (ref 8.3–10.6)
CHLORIDE SERPL-SCNC: 104 MMOL/L (ref 99–110)
CHLORIDE SERPL-SCNC: 106 MMOL/L (ref 99–110)
CO2 BLDV-SCNC: 34 MMOL/L
CO2 SERPL-SCNC: 34 MMOL/L (ref 21–32)
CO2 SERPL-SCNC: 37 MMOL/L (ref 21–32)
COHGB MFR BLDV: 0.4 % (ref 0–1.5)
CREAT SERPL-MCNC: 1 MG/DL (ref 0.6–1.2)
CREAT SERPL-MCNC: 1 MG/DL (ref 0.6–1.2)
DEPRECATED RDW RBC AUTO: 17.4 % (ref 12.4–15.4)
GFR SERPLBLD CREATININE-BSD FMLA CKD-EPI: 56 ML/MIN/{1.73_M2}
GFR SERPLBLD CREATININE-BSD FMLA CKD-EPI: 56 ML/MIN/{1.73_M2}
GLUCOSE BLD-MCNC: 101 MG/DL (ref 70–99)
GLUCOSE BLD-MCNC: 116 MG/DL (ref 70–99)
GLUCOSE BLD-MCNC: 121 MG/DL (ref 70–99)
GLUCOSE SERPL-MCNC: 100 MG/DL (ref 70–99)
GLUCOSE SERPL-MCNC: 116 MG/DL (ref 70–99)
HCO3 BLDV-SCNC: 32.2 MMOL/L (ref 23–29)
HCT VFR BLD AUTO: 28.7 % (ref 36–48)
HCT VFR BLD AUTO: 29.3 % (ref 36–48)
HCT VFR BLD AUTO: 32.7 % (ref 36–48)
HCT VFR BLD AUTO: 33.2 % (ref 36–48)
HGB BLD-MCNC: 10.4 G/DL (ref 12–16)
HGB BLD-MCNC: 9 G/DL (ref 12–16)
HGB BLD-MCNC: 9.4 G/DL (ref 12–16)
HGB BLD-MCNC: 9.7 G/DL (ref 12–16)
MAGNESIUM SERPL-MCNC: 1.89 MG/DL (ref 1.8–2.4)
MCH RBC QN AUTO: 28.9 PG (ref 26–34)
MCHC RBC AUTO-ENTMCNC: 32.2 G/DL (ref 31–36)
MCV RBC AUTO: 89.8 FL (ref 80–100)
METHGB MFR BLDV: 0.3 %
O2 THERAPY: ABNORMAL
PCO2 BLDV: 60 MMHG (ref 40–50)
PERFORMED ON: ABNORMAL
PH BLDV: 7.35 [PH] (ref 7.35–7.45)
PHOSPHATE SERPL-MCNC: 1.4 MG/DL (ref 2.5–4.9)
PLATELET # BLD AUTO: 250 K/UL (ref 135–450)
PMV BLD AUTO: 7.7 FL (ref 5–10.5)
PO2 BLDV: 51.9 MMHG (ref 25–40)
POTASSIUM SERPL-SCNC: 3.4 MMOL/L (ref 3.5–5.1)
POTASSIUM SERPL-SCNC: 3.9 MMOL/L (ref 3.5–5.1)
RBC # BLD AUTO: 3.26 M/UL (ref 4–5.2)
SAO2 % BLDV: 84 %
SODIUM SERPL-SCNC: 146 MMOL/L (ref 136–145)
SODIUM SERPL-SCNC: 147 MMOL/L (ref 136–145)
WBC # BLD AUTO: 9.8 K/UL (ref 4–11)

## 2025-01-15 PROCEDURE — 2580000003 HC RX 258: Performed by: INTERNAL MEDICINE

## 2025-01-15 PROCEDURE — 36415 COLL VENOUS BLD VENIPUNCTURE: CPT

## 2025-01-15 PROCEDURE — 94640 AIRWAY INHALATION TREATMENT: CPT

## 2025-01-15 PROCEDURE — 80069 RENAL FUNCTION PANEL: CPT

## 2025-01-15 PROCEDURE — 87040 BLOOD CULTURE FOR BACTERIA: CPT

## 2025-01-15 PROCEDURE — 6370000000 HC RX 637 (ALT 250 FOR IP): Performed by: INTERNAL MEDICINE

## 2025-01-15 PROCEDURE — 2500000003 HC RX 250 WO HCPCS: Performed by: INTERNAL MEDICINE

## 2025-01-15 PROCEDURE — 6360000002 HC RX W HCPCS: Performed by: INTERNAL MEDICINE

## 2025-01-15 PROCEDURE — 85018 HEMOGLOBIN: CPT

## 2025-01-15 PROCEDURE — 94660 CPAP INITIATION&MGMT: CPT

## 2025-01-15 PROCEDURE — 82803 BLOOD GASES ANY COMBINATION: CPT

## 2025-01-15 PROCEDURE — 99291 CRITICAL CARE FIRST HOUR: CPT | Performed by: INTERNAL MEDICINE

## 2025-01-15 PROCEDURE — 85027 COMPLETE CBC AUTOMATED: CPT

## 2025-01-15 PROCEDURE — 85014 HEMATOCRIT: CPT

## 2025-01-15 PROCEDURE — 99233 SBSQ HOSP IP/OBS HIGH 50: CPT | Performed by: INTERNAL MEDICINE

## 2025-01-15 PROCEDURE — 2000000000 HC ICU R&B

## 2025-01-15 PROCEDURE — 94761 N-INVAS EAR/PLS OXIMETRY MLT: CPT

## 2025-01-15 PROCEDURE — 83735 ASSAY OF MAGNESIUM: CPT

## 2025-01-15 PROCEDURE — 2700000000 HC OXYGEN THERAPY PER DAY

## 2025-01-15 RX ORDER — POTASSIUM CHLORIDE 7.45 MG/ML
10 INJECTION INTRAVENOUS
Status: COMPLETED | OUTPATIENT
Start: 2025-01-15 | End: 2025-01-15

## 2025-01-15 RX ORDER — CHLOROTHIAZIDE SODIUM 500 MG/1
500 INJECTION INTRAVENOUS ONCE
Status: COMPLETED | OUTPATIENT
Start: 2025-01-15 | End: 2025-01-15

## 2025-01-15 RX ORDER — POTASSIUM CHLORIDE 7.45 MG/ML
10 INJECTION INTRAVENOUS
Status: ACTIVE | OUTPATIENT
Start: 2025-01-15 | End: 2025-01-15

## 2025-01-15 RX ORDER — POTASSIUM CHLORIDE 7.45 MG/ML
10 INJECTION INTRAVENOUS PRN
Status: DISCONTINUED | OUTPATIENT
Start: 2025-01-15 | End: 2025-01-27 | Stop reason: HOSPADM

## 2025-01-15 RX ORDER — POTASSIUM CHLORIDE 750 MG/1
40 TABLET, EXTENDED RELEASE ORAL PRN
Status: DISCONTINUED | OUTPATIENT
Start: 2025-01-15 | End: 2025-01-27 | Stop reason: HOSPADM

## 2025-01-15 RX ADMIN — IPRATROPIUM BROMIDE AND ALBUTEROL SULFATE 1 DOSE: 2.5; .5 SOLUTION RESPIRATORY (INHALATION) at 19:51

## 2025-01-15 RX ADMIN — SODIUM CHLORIDE, PRESERVATIVE FREE 10 ML: 5 INJECTION INTRAVENOUS at 21:36

## 2025-01-15 RX ADMIN — MICONAZOLE NITRATE: 2 OINTMENT TOPICAL at 10:43

## 2025-01-15 RX ADMIN — POTASSIUM CHLORIDE 10 MEQ: 7.46 INJECTION, SOLUTION INTRAVENOUS at 06:53

## 2025-01-15 RX ADMIN — DEXMEDETOMIDINE HYDROCHLORIDE 0.2 MCG/KG/HR: 400 INJECTION, SOLUTION INTRAVENOUS at 10:39

## 2025-01-15 RX ADMIN — POTASSIUM CHLORIDE 10 MEQ: 7.46 INJECTION, SOLUTION INTRAVENOUS at 07:58

## 2025-01-15 RX ADMIN — POTASSIUM CHLORIDE 10 MEQ: 7.46 INJECTION, SOLUTION INTRAVENOUS at 04:58

## 2025-01-15 RX ADMIN — METOPROLOL TARTRATE 12.5 MG: 25 TABLET, FILM COATED ORAL at 10:21

## 2025-01-15 RX ADMIN — LORAZEPAM 0.5 MG: 2 INJECTION INTRAMUSCULAR; INTRAVENOUS at 10:20

## 2025-01-15 RX ADMIN — Medication 40 MG: at 10:20

## 2025-01-15 RX ADMIN — Medication 40 MG: at 21:38

## 2025-01-15 RX ADMIN — PIPERACILLIN AND TAZOBACTAM 3375 MG: 3; .375 INJECTION, POWDER, LYOPHILIZED, FOR SOLUTION INTRAVENOUS at 16:18

## 2025-01-15 RX ADMIN — WATER 40 MG: 1 INJECTION INTRAMUSCULAR; INTRAVENOUS; SUBCUTANEOUS at 10:20

## 2025-01-15 RX ADMIN — METOPROLOL TARTRATE 12.5 MG: 25 TABLET, FILM COATED ORAL at 21:37

## 2025-01-15 RX ADMIN — PIPERACILLIN AND TAZOBACTAM 3375 MG: 3; .375 INJECTION, POWDER, LYOPHILIZED, FOR SOLUTION INTRAVENOUS at 01:20

## 2025-01-15 RX ADMIN — CHLOROTHIAZIDE SODIUM 500 MG: 500 INJECTION, POWDER, LYOPHILIZED, FOR SOLUTION INTRAVENOUS at 10:43

## 2025-01-15 RX ADMIN — MICONAZOLE NITRATE: 2 OINTMENT TOPICAL at 21:36

## 2025-01-15 RX ADMIN — IPRATROPIUM BROMIDE AND ALBUTEROL SULFATE 1 DOSE: 2.5; .5 SOLUTION RESPIRATORY (INHALATION) at 07:26

## 2025-01-15 RX ADMIN — SODIUM CHLORIDE: 9 INJECTION, SOLUTION INTRAVENOUS at 05:24

## 2025-01-15 RX ADMIN — POTASSIUM CHLORIDE 10 MEQ: 7.46 INJECTION, SOLUTION INTRAVENOUS at 05:51

## 2025-01-15 RX ADMIN — IPRATROPIUM BROMIDE AND ALBUTEROL SULFATE 1 DOSE: 2.5; .5 SOLUTION RESPIRATORY (INHALATION) at 15:36

## 2025-01-15 RX ADMIN — ATORVASTATIN CALCIUM 20 MG: 10 TABLET, FILM COATED ORAL at 21:37

## 2025-01-15 RX ADMIN — CHLOROTHIAZIDE SODIUM 500 MG: 500 INJECTION, POWDER, LYOPHILIZED, FOR SOLUTION INTRAVENOUS at 16:19

## 2025-01-15 RX ADMIN — BUDESONIDE 500 MCG: 0.5 INHALANT RESPIRATORY (INHALATION) at 19:52

## 2025-01-15 RX ADMIN — IPRATROPIUM BROMIDE AND ALBUTEROL SULFATE 1 DOSE: 2.5; .5 SOLUTION RESPIRATORY (INHALATION) at 11:26

## 2025-01-15 RX ADMIN — BUSPIRONE HYDROCHLORIDE 7.5 MG: 7.5 TABLET ORAL at 21:37

## 2025-01-15 RX ADMIN — PIPERACILLIN AND TAZOBACTAM 3375 MG: 3; .375 INJECTION, POWDER, LYOPHILIZED, FOR SOLUTION INTRAVENOUS at 10:18

## 2025-01-15 RX ADMIN — BUDESONIDE 500 MCG: 0.5 INHALANT RESPIRATORY (INHALATION) at 07:27

## 2025-01-15 RX ADMIN — WATER 40 MG: 1 INJECTION INTRAMUSCULAR; INTRAVENOUS; SUBCUTANEOUS at 21:37

## 2025-01-15 RX ADMIN — LEVOTHYROXINE SODIUM 25 MCG: 25 TABLET ORAL at 10:48

## 2025-01-15 NOTE — CARE COORDINATION
Hali with [facility] is seeing patient today regarding [facility].     The patient is accepted at [facility]. Paco/Stephon following.  Patient will need peg tube if not able to eat by mouth prior to discharge.     Straight Medicare - no precert needed.    Spoke with Ori/RN. Patient currently requiring 24 hrs of bipap. Plan for eventual PEG.

## 2025-01-15 NOTE — ACP (ADVANCE CARE PLANNING)
[facility]  Palliative Medicine Consultation Note      Date Of Admission:1/3/2025  Date of consult: 01/15/25  Seen by PC in the past:  No    Recommendations:      Introduced palliative care and had a voluntary discussion about advance care planning. Palliative care consultation ordered for a goals of care discussion Patient's son-in-law Joselyn is present and daughter Juan is on the telephone. Patient's diagnosis and prognosis discussed with family. Patient is eligible for Hospice services at this time, but family is not interested and wants everything possible done for the patient. They are agreeable to allow [facility] to follow along in the patient's care. Plan will be for the patient to go to [facility] when able to. They wish to keep the patient a full code at this time.     1. Goals of Care/Advanced Care planning/Code status: Full  2. Pain: Has pain to her nose due to NG tube  3. SOB: Present  4. Disposition: [facility], [facility] to follow    Reason for Consult:         [x]  Goals of Care  []  Code Status Discussion/Advanced Care Planning   []  Psychosocial/Family Support  []  Symptom Management  []  Other (Specify)    Requesting Physician: Dr. Madison    CHIEF COMPLAINT:  Nausea/vomiting, shortness of breath, pain    History Obtained From:  family- Daughter:Juan, Son=in-law: Joselyn, EMR    History of Present Illness:         Terri Smith is a 84 y.o. female with PMH of atrial fibrillation, COPD, chronic respiratory failure, CHF  who presented to [facility] ED with nausea/vomiting, shortness of air, and pain. Family states that patient was having increased pain, shortness of air, and N/V. They brought her to the ED and she was admitted. Patient had worsening respiratory decline yesterday and had to be on BiPaP all day and all night. Patient currently on 6LPM via NC and states that she feels short of air. Denies N/V or pain at this time.     Subjective:         Past Medical History:

## 2025-01-16 ENCOUNTER — APPOINTMENT (OUTPATIENT)
Dept: GENERAL RADIOLOGY | Age: 85
DRG: 444 | End: 2025-01-16
Payer: MEDICARE

## 2025-01-16 LAB
ANION GAP SERPL CALCULATED.3IONS-SCNC: 10 MMOL/L (ref 3–16)
ANTI-XA UNFRAC HEPARIN: 0.19 IU/ML (ref 0.3–0.7)
ANTI-XA UNFRAC HEPARIN: <0.1 IU/ML (ref 0.3–0.7)
BASE EXCESS BLDA CALC-SCNC: 12 MMOL/L (ref -3–3)
BASOPHILS # BLD: 0 K/UL (ref 0–0.2)
BASOPHILS NFR BLD: 0.1 %
BUN SERPL-MCNC: 17 MG/DL (ref 7–20)
CALCIUM SERPL-MCNC: 9 MG/DL (ref 8.3–10.6)
CHLORIDE SERPL-SCNC: 103 MMOL/L (ref 99–110)
CO2 BLDA-SCNC: 38.5 MMOL/L
CO2 SERPL-SCNC: 30 MMOL/L (ref 21–32)
COHGB MFR BLDA: 0.3 % (ref 0–1.5)
CREAT SERPL-MCNC: 1 MG/DL (ref 0.6–1.2)
DEPRECATED RDW RBC AUTO: 17.5 % (ref 12.4–15.4)
EOSINOPHIL # BLD: 0 K/UL (ref 0–0.6)
EOSINOPHIL NFR BLD: 0 %
GFR SERPLBLD CREATININE-BSD FMLA CKD-EPI: 56 ML/MIN/{1.73_M2}
GLUCOSE BLD-MCNC: 102 MG/DL (ref 70–99)
GLUCOSE BLD-MCNC: 85 MG/DL (ref 70–99)
GLUCOSE BLD-MCNC: 91 MG/DL (ref 70–99)
GLUCOSE BLD-MCNC: 94 MG/DL (ref 70–99)
GLUCOSE BLD-MCNC: 95 MG/DL (ref 70–99)
GLUCOSE SERPL-MCNC: 93 MG/DL (ref 70–99)
HCO3 BLDA-SCNC: 36.9 MMOL/L (ref 21–29)
HCT VFR BLD AUTO: 25.9 % (ref 36–48)
HCT VFR BLD AUTO: 27.3 % (ref 36–48)
HCT VFR BLD AUTO: 27.4 % (ref 36–48)
HGB BLD-MCNC: 8.4 G/DL (ref 12–16)
HGB BLD-MCNC: 8.5 G/DL (ref 12–16)
HGB BLD-MCNC: 8.6 G/DL (ref 12–16)
HGB BLDA-MCNC: 9.7 G/DL (ref 12–16)
LYMPHOCYTES # BLD: 0.4 K/UL (ref 1–5.1)
LYMPHOCYTES NFR BLD: 9.2 %
MCH RBC QN AUTO: 29.2 PG (ref 26–34)
MCHC RBC AUTO-ENTMCNC: 31.6 G/DL (ref 31–36)
MCV RBC AUTO: 92.4 FL (ref 80–100)
METHGB MFR BLDA: 0 %
MONOCYTES # BLD: 0.2 K/UL (ref 0–1.3)
MONOCYTES NFR BLD: 4.7 %
NEUTROPHILS # BLD: 4.1 K/UL (ref 1.7–7.7)
NEUTROPHILS NFR BLD: 86 %
O2 THERAPY: ABNORMAL
PCO2 BLDA: 50.7 MMHG (ref 35–45)
PERFORMED ON: ABNORMAL
PERFORMED ON: NORMAL
PH BLDA: 7.48 [PH] (ref 7.35–7.45)
PLATELET # BLD AUTO: 204 K/UL (ref 135–450)
PMV BLD AUTO: 8.6 FL (ref 5–10.5)
PO2 BLDA: 110.2 MMHG (ref 75–108)
POTASSIUM SERPL-SCNC: 4.1 MMOL/L (ref 3.5–5.1)
RBC # BLD AUTO: 2.96 M/UL (ref 4–5.2)
SAO2 % BLDA: 98.2 %
SODIUM SERPL-SCNC: 143 MMOL/L (ref 136–145)
WBC # BLD AUTO: 4.7 K/UL (ref 4–11)

## 2025-01-16 PROCEDURE — 2500000003 HC RX 250 WO HCPCS: Performed by: INTERNAL MEDICINE

## 2025-01-16 PROCEDURE — 6370000000 HC RX 637 (ALT 250 FOR IP): Performed by: INTERNAL MEDICINE

## 2025-01-16 PROCEDURE — 97530 THERAPEUTIC ACTIVITIES: CPT

## 2025-01-16 PROCEDURE — 85025 COMPLETE CBC W/AUTO DIFF WBC: CPT

## 2025-01-16 PROCEDURE — 99291 CRITICAL CARE FIRST HOUR: CPT | Performed by: INTERNAL MEDICINE

## 2025-01-16 PROCEDURE — 6360000002 HC RX W HCPCS: Performed by: INTERNAL MEDICINE

## 2025-01-16 PROCEDURE — 02HV33Z INSERTION OF INFUSION DEVICE INTO SUPERIOR VENA CAVA, PERCUTANEOUS APPROACH: ICD-10-PCS | Performed by: INTERNAL MEDICINE

## 2025-01-16 PROCEDURE — 36415 COLL VENOUS BLD VENIPUNCTURE: CPT

## 2025-01-16 PROCEDURE — 99233 SBSQ HOSP IP/OBS HIGH 50: CPT | Performed by: INTERNAL MEDICINE

## 2025-01-16 PROCEDURE — 36556 INSERT NON-TUNNEL CV CATH: CPT

## 2025-01-16 PROCEDURE — 94640 AIRWAY INHALATION TREATMENT: CPT

## 2025-01-16 PROCEDURE — 85018 HEMOGLOBIN: CPT

## 2025-01-16 PROCEDURE — 36556 INSERT NON-TUNNEL CV CATH: CPT | Performed by: INTERNAL MEDICINE

## 2025-01-16 PROCEDURE — 94660 CPAP INITIATION&MGMT: CPT

## 2025-01-16 PROCEDURE — 94761 N-INVAS EAR/PLS OXIMETRY MLT: CPT

## 2025-01-16 PROCEDURE — 82803 BLOOD GASES ANY COMBINATION: CPT

## 2025-01-16 PROCEDURE — 2580000003 HC RX 258: Performed by: INTERNAL MEDICINE

## 2025-01-16 PROCEDURE — 92611 MOTION FLUOROSCOPY/SWALLOW: CPT

## 2025-01-16 PROCEDURE — 97535 SELF CARE MNGMENT TRAINING: CPT

## 2025-01-16 PROCEDURE — 80048 BASIC METABOLIC PNL TOTAL CA: CPT

## 2025-01-16 PROCEDURE — 2000000000 HC ICU R&B

## 2025-01-16 PROCEDURE — 74230 X-RAY XM SWLNG FUNCJ C+: CPT

## 2025-01-16 PROCEDURE — 85014 HEMATOCRIT: CPT

## 2025-01-16 PROCEDURE — 85520 HEPARIN ASSAY: CPT

## 2025-01-16 PROCEDURE — 2700000000 HC OXYGEN THERAPY PER DAY

## 2025-01-16 PROCEDURE — 71045 X-RAY EXAM CHEST 1 VIEW: CPT

## 2025-01-16 PROCEDURE — 36600 WITHDRAWAL OF ARTERIAL BLOOD: CPT

## 2025-01-16 RX ORDER — HEPARIN SODIUM 1000 [USP'U]/ML
60 INJECTION, SOLUTION INTRAVENOUS; SUBCUTANEOUS PRN
Status: DISCONTINUED | OUTPATIENT
Start: 2025-01-16 | End: 2025-01-19

## 2025-01-16 RX ORDER — FUROSEMIDE 10 MG/ML
20 INJECTION INTRAMUSCULAR; INTRAVENOUS 2 TIMES DAILY
Status: DISCONTINUED | OUTPATIENT
Start: 2025-01-16 | End: 2025-01-18

## 2025-01-16 RX ORDER — HEPARIN SODIUM 1000 [USP'U]/ML
30 INJECTION, SOLUTION INTRAVENOUS; SUBCUTANEOUS ONCE
Status: COMPLETED | OUTPATIENT
Start: 2025-01-16 | End: 2025-01-16

## 2025-01-16 RX ORDER — HEPARIN SODIUM 10000 [USP'U]/100ML
18 INJECTION, SOLUTION INTRAVENOUS CONTINUOUS
Status: DISCONTINUED | OUTPATIENT
Start: 2025-01-16 | End: 2025-01-19

## 2025-01-16 RX ORDER — HEPARIN SODIUM 1000 [USP'U]/ML
30 INJECTION, SOLUTION INTRAVENOUS; SUBCUTANEOUS PRN
Status: DISCONTINUED | OUTPATIENT
Start: 2025-01-16 | End: 2025-01-19

## 2025-01-16 RX ORDER — HEPARIN SODIUM 1000 [USP'U]/ML
60 INJECTION, SOLUTION INTRAVENOUS; SUBCUTANEOUS ONCE
Status: COMPLETED | OUTPATIENT
Start: 2025-01-16 | End: 2025-01-16

## 2025-01-16 RX ADMIN — IPRATROPIUM BROMIDE AND ALBUTEROL SULFATE 1 DOSE: 2.5; .5 SOLUTION RESPIRATORY (INHALATION) at 07:38

## 2025-01-16 RX ADMIN — FUROSEMIDE 20 MG: 10 INJECTION, SOLUTION INTRAMUSCULAR; INTRAVENOUS at 09:31

## 2025-01-16 RX ADMIN — BUDESONIDE 500 MCG: 0.5 INHALANT RESPIRATORY (INHALATION) at 07:38

## 2025-01-16 RX ADMIN — Medication 400 MG: at 09:30

## 2025-01-16 RX ADMIN — MICONAZOLE NITRATE: 2 OINTMENT TOPICAL at 22:00

## 2025-01-16 RX ADMIN — DEXMEDETOMIDINE HYDROCHLORIDE 0.2 MCG/KG/HR: 400 INJECTION, SOLUTION INTRAVENOUS at 18:10

## 2025-01-16 RX ADMIN — ESCITALOPRAM OXALATE 10 MG: 10 TABLET ORAL at 09:30

## 2025-01-16 RX ADMIN — HEPARIN SODIUM 2800 UNITS: 1000 INJECTION INTRAVENOUS; SUBCUTANEOUS at 12:06

## 2025-01-16 RX ADMIN — PIPERACILLIN AND TAZOBACTAM 3375 MG: 3; .375 INJECTION, POWDER, LYOPHILIZED, FOR SOLUTION INTRAVENOUS at 09:42

## 2025-01-16 RX ADMIN — HEPARIN SODIUM 12 UNITS/KG/HR: 10000 INJECTION, SOLUTION INTRAVENOUS at 12:12

## 2025-01-16 RX ADMIN — DEXMEDETOMIDINE HYDROCHLORIDE 0.6 MCG/KG/HR: 400 INJECTION, SOLUTION INTRAVENOUS at 04:10

## 2025-01-16 RX ADMIN — MICONAZOLE NITRATE: 2 OINTMENT TOPICAL at 09:32

## 2025-01-16 RX ADMIN — METOPROLOL TARTRATE 12.5 MG: 25 TABLET, FILM COATED ORAL at 09:30

## 2025-01-16 RX ADMIN — WATER 40 MG: 1 INJECTION INTRAMUSCULAR; INTRAVENOUS; SUBCUTANEOUS at 09:31

## 2025-01-16 RX ADMIN — Medication 40 MG: at 09:31

## 2025-01-16 RX ADMIN — IPRATROPIUM BROMIDE AND ALBUTEROL SULFATE 1 DOSE: 2.5; .5 SOLUTION RESPIRATORY (INHALATION) at 15:03

## 2025-01-16 RX ADMIN — ATORVASTATIN CALCIUM 20 MG: 10 TABLET, FILM COATED ORAL at 21:26

## 2025-01-16 RX ADMIN — IPRATROPIUM BROMIDE AND ALBUTEROL SULFATE 1 DOSE: 2.5; .5 SOLUTION RESPIRATORY (INHALATION) at 11:59

## 2025-01-16 RX ADMIN — PIPERACILLIN AND TAZOBACTAM 3375 MG: 3; .375 INJECTION, POWDER, LYOPHILIZED, FOR SOLUTION INTRAVENOUS at 00:15

## 2025-01-16 RX ADMIN — LEVOTHYROXINE SODIUM 25 MCG: 25 TABLET ORAL at 09:32

## 2025-01-16 RX ADMIN — BUDESONIDE 500 MCG: 0.5 INHALANT RESPIRATORY (INHALATION) at 19:56

## 2025-01-16 RX ADMIN — WATER 40 MG: 1 INJECTION INTRAMUSCULAR; INTRAVENOUS; SUBCUTANEOUS at 21:26

## 2025-01-16 RX ADMIN — PIPERACILLIN AND TAZOBACTAM 3375 MG: 3; .375 INJECTION, POWDER, LYOPHILIZED, FOR SOLUTION INTRAVENOUS at 16:20

## 2025-01-16 RX ADMIN — SODIUM CHLORIDE, PRESERVATIVE FREE 10 ML: 5 INJECTION INTRAVENOUS at 09:32

## 2025-01-16 RX ADMIN — SODIUM CHLORIDE, PRESERVATIVE FREE 10 ML: 5 INJECTION INTRAVENOUS at 23:16

## 2025-01-16 RX ADMIN — BUSPIRONE HYDROCHLORIDE 7.5 MG: 7.5 TABLET ORAL at 21:26

## 2025-01-16 RX ADMIN — IPRATROPIUM BROMIDE AND ALBUTEROL SULFATE 1 DOSE: 2.5; .5 SOLUTION RESPIRATORY (INHALATION) at 19:56

## 2025-01-16 RX ADMIN — PIPERACILLIN AND TAZOBACTAM 3375 MG: 3; .375 INJECTION, POWDER, LYOPHILIZED, FOR SOLUTION INTRAVENOUS at 23:23

## 2025-01-16 RX ADMIN — MIDODRINE HYDROCHLORIDE 5 MG: 5 TABLET ORAL at 23:14

## 2025-01-16 RX ADMIN — Medication 40 MG: at 21:25

## 2025-01-16 RX ADMIN — HEPARIN SODIUM 1400 UNITS: 1000 INJECTION INTRAVENOUS; SUBCUTANEOUS at 18:44

## 2025-01-16 RX ADMIN — BUSPIRONE HYDROCHLORIDE 7.5 MG: 7.5 TABLET ORAL at 09:31

## 2025-01-16 RX ADMIN — DOCUSATE SODIUM 100 MG: 100 CAPSULE, LIQUID FILLED ORAL at 09:30

## 2025-01-16 RX ADMIN — FUROSEMIDE 20 MG: 10 INJECTION, SOLUTION INTRAMUSCULAR; INTRAVENOUS at 18:22

## 2025-01-16 NOTE — FLOWSHEET NOTE
01/16/25 0800   Vitals   Temp 97.1 °F (36.2 °C)   Temp Source Temporal   Pulse 66   Heart Rate Source Monitor   Respirations 16   BP (!) 153/83   MAP (Calculated) 106   MAP (mmHg) 103   BP Method Automatic   Oxygen Therapy   SpO2 100 %   O2 Device PAP (positive airway pressure)   FiO2  40 %     Vital signs stable. Pt is alert and oriented X3 and denies any worsening pain or SOB at the moment. Continues with intermittent bouts of confusion. Continues to tolerate BiPAP with precedex therapy. Nothing new noted on head to toe assessment. Lung sounds diminished. Pt is NSR on the monitor. Morning medication administration completed. Precedex continues at 0.6 mcg/kg/hr. Pt and family denies any further assistance at the moment. Will continue to monitor.

## 2025-01-16 NOTE — PROCEDURES
SPEECH/LANGUAGE PATHOLOGY  Swallowing Disorders and Dysphagia  VIDEOFLUOROSCOPIC STUDY OF SWALLOWING (VFSS) / Modified Barium Swallow Study (MBSS)  [place]ility[place]Department[place] [facility]    PATIENT NAME:  Terri Smith      :  1940    Room: 3011/3011-01   TODAY'S DATE:  2025    [x]The admitting diagnosis and active problem list, as listed below have been reviewed prior to initiation of this evaluation.     ADMITTING DIAGNOSIS: Choledocholithiasis with acute cholecystitis [K80.42]  Cholecystitis [K81.9]  Dysphagia, unspecified type [R13.10]     ACTIVE PROBLEM LIST:   Patient Active Problem List   Diagnosis    Hyperlipidemia    Asthma    OA (osteoarthritis)    Former smoker    COPD exacerbation ([facility])    Septicemia ([facility])    Abnormal chest x-ray    COPD with acute exacerbation ([facility])    Dyspnea and respiratory abnormalities    History of tobacco abuse    Moderate malnutrition ([facility])    NSTEMI (non-ST elevated myocardial infarction) ([facility])    Acute respiratory failure with hypoxia    CAD (coronary artery disease)    Acute pulmonary edema    Pulmonary infiltrate    Elevated brain natriuretic peptide (BNP) level    Leukocytosis    Ischemic cardiomyopathy    Acute combined systolic and diastolic congestive heart failure ([facility])    Hypernatremia    NADJA (acute kidney injury) ([facility])    Status post aorto-coronary artery bypass graft    Mucus plugging of bronchi    CAD in native artery    S/P CABG x 2    Pneumonia of both lower lobes due to methicillin resistant Staphylococcus aureus (MRSA) ([facility])    GI bleed    Severe malnutrition ([facility])    Chest pain    Chronic respiratory failure with hypoxia    Primary hypertension    Anemia    Acute on chronic hypoxic respiratory failure    Acute respiratory distress    Volume overload    Demand ischemia ([facility])    GERD (gastroesophageal reflux disease)    Acute respiratory failure with hypoxia and hypercapnia    Shock    Pneumonia due to infectious organism    Acute on chronic respiratory

## 2025-01-16 NOTE — PROCEDURES
Procedure: Nontunneled central venous access, right IJ    Indication: invasive hemodynamic monitoring, frequent blood draws, ensure stable IV access    Informed Consent: was obtained from family     Time Out: taken    Procedure: Sterile prep with chlorhexidine.  Full maximum sterile field/barrier technique was followed (with cap and mask and sterile gown and large sterile sheet and hand hygeine and 2% chlorhexidine).  Aqueous lidocaine anesthetic.  Direct ultrasound visualization of the right internal jugular vein, with placement of central venous catheter using modified seldinger technique.  Good dark venous blood from all 3 lumens.  CXR confirmed appropriate placement in mid to distal SVC.  No immediate complication.    Recommendation: remove central line at earliest time feasible to mitigate infectious risks

## 2025-01-17 LAB
ALBUMIN SERPL-MCNC: 2.6 G/DL (ref 3.4–5)
ANION GAP SERPL CALCULATED.3IONS-SCNC: 8 MMOL/L (ref 3–16)
ANION GAP SERPL CALCULATED.3IONS-SCNC: 9 MMOL/L (ref 3–16)
ANTI-XA UNFRAC HEPARIN: 0.18 IU/ML (ref 0.3–0.7)
ANTI-XA UNFRAC HEPARIN: 0.25 IU/ML (ref 0.3–0.7)
ANTI-XA UNFRAC HEPARIN: 0.34 IU/ML (ref 0.3–0.7)
ANTI-XA UNFRAC HEPARIN: 0.47 IU/ML (ref 0.3–0.7)
BASOPHILS # BLD: 0 K/UL (ref 0–0.2)
BASOPHILS NFR BLD: 0.1 %
BUN SERPL-MCNC: 19 MG/DL (ref 7–20)
BUN SERPL-MCNC: 22 MG/DL (ref 7–20)
CALCIUM SERPL-MCNC: 8.1 MG/DL (ref 8.3–10.6)
CALCIUM SERPL-MCNC: 8.8 MG/DL (ref 8.3–10.6)
CHLORIDE SERPL-SCNC: 101 MMOL/L (ref 99–110)
CHLORIDE SERPL-SCNC: 96 MMOL/L (ref 99–110)
CO2 SERPL-SCNC: 37 MMOL/L (ref 21–32)
CO2 SERPL-SCNC: 40 MMOL/L (ref 21–32)
CREAT SERPL-MCNC: 1 MG/DL (ref 0.6–1.2)
CREAT SERPL-MCNC: 1.1 MG/DL (ref 0.6–1.2)
DEPRECATED RDW RBC AUTO: 16 % (ref 12.4–15.4)
EOSINOPHIL # BLD: 0 K/UL (ref 0–0.6)
EOSINOPHIL NFR BLD: 0 %
GFR SERPLBLD CREATININE-BSD FMLA CKD-EPI: 50 ML/MIN/{1.73_M2}
GFR SERPLBLD CREATININE-BSD FMLA CKD-EPI: 56 ML/MIN/{1.73_M2}
GLUCOSE BLD-MCNC: 107 MG/DL (ref 70–99)
GLUCOSE BLD-MCNC: 127 MG/DL (ref 70–99)
GLUCOSE BLD-MCNC: 131 MG/DL (ref 70–99)
GLUCOSE BLD-MCNC: 167 MG/DL (ref 70–99)
GLUCOSE BLD-MCNC: 208 MG/DL (ref 70–99)
GLUCOSE SERPL-MCNC: 143 MG/DL (ref 70–99)
GLUCOSE SERPL-MCNC: 156 MG/DL (ref 70–99)
HCT VFR BLD AUTO: 26.1 % (ref 36–48)
HGB BLD-MCNC: 8.7 G/DL (ref 12–16)
LYMPHOCYTES # BLD: 0.4 K/UL (ref 1–5.1)
LYMPHOCYTES NFR BLD: 5.5 %
MCH RBC QN AUTO: 29.5 PG (ref 26–34)
MCHC RBC AUTO-ENTMCNC: 33.4 G/DL (ref 31–36)
MCV RBC AUTO: 88.3 FL (ref 80–100)
MONOCYTES # BLD: 0.3 K/UL (ref 0–1.3)
MONOCYTES NFR BLD: 3.9 %
NEUTROPHILS # BLD: 6 K/UL (ref 1.7–7.7)
NEUTROPHILS NFR BLD: 90.5 %
PERFORMED ON: ABNORMAL
PHOSPHATE SERPL-MCNC: 1.7 MG/DL (ref 2.5–4.9)
PHOSPHATE SERPL-MCNC: 3.6 MG/DL (ref 2.5–4.9)
PLATELET # BLD AUTO: 224 K/UL (ref 135–450)
PMV BLD AUTO: 8.4 FL (ref 5–10.5)
POTASSIUM SERPL-SCNC: 2.4 MMOL/L (ref 3.5–5.1)
POTASSIUM SERPL-SCNC: 3.5 MMOL/L (ref 3.5–5.1)
RBC # BLD AUTO: 2.95 M/UL (ref 4–5.2)
SODIUM SERPL-SCNC: 144 MMOL/L (ref 136–145)
SODIUM SERPL-SCNC: 147 MMOL/L (ref 136–145)
WBC # BLD AUTO: 6.6 K/UL (ref 4–11)

## 2025-01-17 PROCEDURE — 2000000000 HC ICU R&B

## 2025-01-17 PROCEDURE — 6360000002 HC RX W HCPCS: Performed by: INTERNAL MEDICINE

## 2025-01-17 PROCEDURE — 85025 COMPLETE CBC W/AUTO DIFF WBC: CPT

## 2025-01-17 PROCEDURE — 94761 N-INVAS EAR/PLS OXIMETRY MLT: CPT

## 2025-01-17 PROCEDURE — 36415 COLL VENOUS BLD VENIPUNCTURE: CPT

## 2025-01-17 PROCEDURE — 6370000000 HC RX 637 (ALT 250 FOR IP): Performed by: INTERNAL MEDICINE

## 2025-01-17 PROCEDURE — 80069 RENAL FUNCTION PANEL: CPT

## 2025-01-17 PROCEDURE — 2500000003 HC RX 250 WO HCPCS: Performed by: INTERNAL MEDICINE

## 2025-01-17 PROCEDURE — 99291 CRITICAL CARE FIRST HOUR: CPT | Performed by: INTERNAL MEDICINE

## 2025-01-17 PROCEDURE — 85520 HEPARIN ASSAY: CPT

## 2025-01-17 PROCEDURE — 84100 ASSAY OF PHOSPHORUS: CPT

## 2025-01-17 PROCEDURE — 94660 CPAP INITIATION&MGMT: CPT

## 2025-01-17 PROCEDURE — 2580000003 HC RX 258: Performed by: INTERNAL MEDICINE

## 2025-01-17 PROCEDURE — 94640 AIRWAY INHALATION TREATMENT: CPT

## 2025-01-17 PROCEDURE — 2700000000 HC OXYGEN THERAPY PER DAY

## 2025-01-17 PROCEDURE — 99233 SBSQ HOSP IP/OBS HIGH 50: CPT | Performed by: INTERNAL MEDICINE

## 2025-01-17 RX ORDER — HEPARIN SODIUM 1000 [USP'U]/ML
30 INJECTION, SOLUTION INTRAVENOUS; SUBCUTANEOUS ONCE
Status: COMPLETED | OUTPATIENT
Start: 2025-01-17 | End: 2025-01-17

## 2025-01-17 RX ADMIN — Medication 400 MG: at 08:39

## 2025-01-17 RX ADMIN — DEXMEDETOMIDINE HYDROCHLORIDE 0.3 MCG/KG/HR: 400 INJECTION, SOLUTION INTRAVENOUS at 23:40

## 2025-01-17 RX ADMIN — METOPROLOL TARTRATE 12.5 MG: 25 TABLET, FILM COATED ORAL at 08:40

## 2025-01-17 RX ADMIN — ATORVASTATIN CALCIUM 20 MG: 10 TABLET, FILM COATED ORAL at 19:51

## 2025-01-17 RX ADMIN — Medication 40 MG: at 19:53

## 2025-01-17 RX ADMIN — POTASSIUM CHLORIDE 10 MEQ: 7.45 INJECTION INTRAVENOUS at 06:41

## 2025-01-17 RX ADMIN — FUROSEMIDE 20 MG: 10 INJECTION, SOLUTION INTRAMUSCULAR; INTRAVENOUS at 08:39

## 2025-01-17 RX ADMIN — SODIUM CHLORIDE, PRESERVATIVE FREE 10 ML: 5 INJECTION INTRAVENOUS at 19:53

## 2025-01-17 RX ADMIN — WATER 40 MG: 1 INJECTION INTRAMUSCULAR; INTRAVENOUS; SUBCUTANEOUS at 11:55

## 2025-01-17 RX ADMIN — POTASSIUM CHLORIDE 10 MEQ: 7.45 INJECTION INTRAVENOUS at 09:58

## 2025-01-17 RX ADMIN — BUSPIRONE HYDROCHLORIDE 7.5 MG: 7.5 TABLET ORAL at 19:52

## 2025-01-17 RX ADMIN — Medication 40 MG: at 08:40

## 2025-01-17 RX ADMIN — IPRATROPIUM BROMIDE AND ALBUTEROL SULFATE 1 DOSE: 2.5; .5 SOLUTION RESPIRATORY (INHALATION) at 15:28

## 2025-01-17 RX ADMIN — ONDANSETRON 4 MG: 2 INJECTION INTRAMUSCULAR; INTRAVENOUS at 12:54

## 2025-01-17 RX ADMIN — POTASSIUM CHLORIDE 10 MEQ: 7.45 INJECTION INTRAVENOUS at 08:29

## 2025-01-17 RX ADMIN — BUDESONIDE 500 MCG: 0.5 INHALANT RESPIRATORY (INHALATION) at 19:35

## 2025-01-17 RX ADMIN — FUROSEMIDE 20 MG: 10 INJECTION, SOLUTION INTRAMUSCULAR; INTRAVENOUS at 17:59

## 2025-01-17 RX ADMIN — BUDESONIDE 500 MCG: 0.5 INHALANT RESPIRATORY (INHALATION) at 07:35

## 2025-01-17 RX ADMIN — IPRATROPIUM BROMIDE AND ALBUTEROL SULFATE 1 DOSE: 2.5; .5 SOLUTION RESPIRATORY (INHALATION) at 07:35

## 2025-01-17 RX ADMIN — HEPARIN SODIUM 16 UNITS/KG/HR: 10000 INJECTION, SOLUTION INTRAVENOUS at 11:51

## 2025-01-17 RX ADMIN — SODIUM CHLORIDE, PRESERVATIVE FREE 10 ML: 5 INJECTION INTRAVENOUS at 08:44

## 2025-01-17 RX ADMIN — MICONAZOLE NITRATE: 2 OINTMENT TOPICAL at 08:41

## 2025-01-17 RX ADMIN — IPRATROPIUM BROMIDE AND ALBUTEROL SULFATE 1 DOSE: 2.5; .5 SOLUTION RESPIRATORY (INHALATION) at 19:35

## 2025-01-17 RX ADMIN — IPRATROPIUM BROMIDE AND ALBUTEROL SULFATE 1 DOSE: 2.5; .5 SOLUTION RESPIRATORY (INHALATION) at 12:02

## 2025-01-17 RX ADMIN — MICONAZOLE NITRATE: 2 OINTMENT TOPICAL at 21:27

## 2025-01-17 RX ADMIN — LEVOTHYROXINE SODIUM 25 MCG: 25 TABLET ORAL at 06:41

## 2025-01-17 RX ADMIN — SODIUM CHLORIDE, PRESERVATIVE FREE 10 ML: 5 INJECTION INTRAVENOUS at 08:45

## 2025-01-17 RX ADMIN — BUSPIRONE HYDROCHLORIDE 7.5 MG: 7.5 TABLET ORAL at 08:39

## 2025-01-17 RX ADMIN — HEPARIN SODIUM 1400 UNITS: 1000 INJECTION INTRAVENOUS; SUBCUTANEOUS at 02:56

## 2025-01-17 RX ADMIN — ESCITALOPRAM OXALATE 10 MG: 10 TABLET ORAL at 08:39

## 2025-01-17 RX ADMIN — POTASSIUM PHOSPHATE, MONOBASIC POTASSIUM PHOSPHATE, DIBASIC 30 MMOL: 224; 236 INJECTION, SOLUTION, CONCENTRATE INTRAVENOUS at 12:00

## 2025-01-17 RX ADMIN — WATER 40 MG: 1 INJECTION INTRAMUSCULAR; INTRAVENOUS; SUBCUTANEOUS at 23:20

## 2025-01-17 RX ADMIN — POTASSIUM CHLORIDE 10 MEQ: 7.45 INJECTION INTRAVENOUS at 07:28

## 2025-01-17 RX ADMIN — HEPARIN SODIUM 18 UNITS/KG/HR: 10000 INJECTION, SOLUTION INTRAVENOUS at 23:22

## 2025-01-17 RX ADMIN — HEPARIN SODIUM 1400 UNITS: 1000 INJECTION INTRAVENOUS; SUBCUTANEOUS at 16:07

## 2025-01-17 RX ADMIN — DEXMEDETOMIDINE HYDROCHLORIDE 0.3 MCG/KG/HR: 400 INJECTION, SOLUTION INTRAVENOUS at 03:05

## 2025-01-17 RX ADMIN — METOPROLOL TARTRATE 12.5 MG: 25 TABLET, FILM COATED ORAL at 19:51

## 2025-01-17 NOTE — FLOWSHEET NOTE
01/17/25 0800   Vitals   Temp 97.5 °F (36.4 °C)   Temp Source Oral   Pulse (!) 103   Heart Rate Source Monitor   Respirations (!) 33   BP (!) 125/96   MAP (Calculated) 106   MAP (mmHg) 101   BP Method Automatic   Oxygen Therapy   SpO2 97 %   O2 Device High flow nasal cannula   O2 Flow Rate (L/min) 3 L/min   Ventilator Associated Pneumonia Bundle   Oral Care Performed Mouth moisturizer;Mouthwash with chlorhexidine   Anti-Embolism Intervention Medication  (heparin gtt)       Vital signs stable. Pt is alert and oriented X4. Pt with intermittent complaints of subjective SOB despite maintaining her SPo2 well on her baseline 3L NC.  Nothing new noted on head to toe assessment. Lung sounds diminished. Pt is ST on the monitor. Morning medication administration completed. NG remains secure to left nare. Pt tolerating TF at goal with very little residual.  Precedex remains off. Pt cleaned of incontinence. Heparin gtt continues at 16 unit/kg/hr. Pt and family denies any further assistance at the moment. Will continue to monitor.

## 2025-01-18 LAB
ANION GAP SERPL CALCULATED.3IONS-SCNC: 3 MMOL/L (ref 3–16)
ANTI-XA UNFRAC HEPARIN: 0.5 IU/ML (ref 0.3–0.7)
ANTI-XA UNFRAC HEPARIN: 0.51 IU/ML (ref 0.3–0.7)
BASOPHILS # BLD: 0 K/UL (ref 0–0.2)
BASOPHILS NFR BLD: 0.3 %
BUN SERPL-MCNC: 22 MG/DL (ref 7–20)
CALCIUM SERPL-MCNC: 7.9 MG/DL (ref 8.3–10.6)
CHLORIDE SERPL-SCNC: 103 MMOL/L (ref 99–110)
CO2 SERPL-SCNC: 42 MMOL/L (ref 21–32)
CREAT SERPL-MCNC: 1.1 MG/DL (ref 0.6–1.2)
DEPRECATED RDW RBC AUTO: 16.7 % (ref 12.4–15.4)
EKG ATRIAL RATE: 81 BPM
EKG DIAGNOSIS: NORMAL
EKG P AXIS: 81 DEGREES
EKG P-R INTERVAL: 128 MS
EKG Q-T INTERVAL: 398 MS
EKG QRS DURATION: 98 MS
EKG QTC CALCULATION (BAZETT): 462 MS
EKG R AXIS: -57 DEGREES
EKG T AXIS: 58 DEGREES
EKG VENTRICULAR RATE: 81 BPM
EOSINOPHIL # BLD: 0 K/UL (ref 0–0.6)
EOSINOPHIL NFR BLD: 0 %
GFR SERPLBLD CREATININE-BSD FMLA CKD-EPI: 50 ML/MIN/{1.73_M2}
GLUCOSE BLD-MCNC: 157 MG/DL (ref 70–99)
GLUCOSE BLD-MCNC: 163 MG/DL (ref 70–99)
GLUCOSE BLD-MCNC: 199 MG/DL (ref 70–99)
GLUCOSE BLD-MCNC: 222 MG/DL (ref 70–99)
GLUCOSE SERPL-MCNC: 173 MG/DL (ref 70–99)
HCT VFR BLD AUTO: 25.3 % (ref 36–48)
HGB BLD-MCNC: 8.1 G/DL (ref 12–16)
LYMPHOCYTES # BLD: 0.3 K/UL (ref 1–5.1)
LYMPHOCYTES NFR BLD: 3.5 %
MCH RBC QN AUTO: 29.2 PG (ref 26–34)
MCHC RBC AUTO-ENTMCNC: 31.9 G/DL (ref 31–36)
MCV RBC AUTO: 91.4 FL (ref 80–100)
MONOCYTES # BLD: 0.2 K/UL (ref 0–1.3)
MONOCYTES NFR BLD: 1.9 %
NEUTROPHILS # BLD: 8.8 K/UL (ref 1.7–7.7)
NEUTROPHILS NFR BLD: 94.3 %
PERFORMED ON: ABNORMAL
PHOSPHATE SERPL-MCNC: 3.2 MG/DL (ref 2.5–4.9)
PLATELET # BLD AUTO: 192 K/UL (ref 135–450)
PMV BLD AUTO: 8.1 FL (ref 5–10.5)
POTASSIUM SERPL-SCNC: 3.6 MMOL/L (ref 3.5–5.1)
RBC # BLD AUTO: 2.77 M/UL (ref 4–5.2)
SODIUM SERPL-SCNC: 148 MMOL/L (ref 136–145)
WBC # BLD AUTO: 9.4 K/UL (ref 4–11)

## 2025-01-18 PROCEDURE — 6360000002 HC RX W HCPCS: Performed by: INTERNAL MEDICINE

## 2025-01-18 PROCEDURE — 6370000000 HC RX 637 (ALT 250 FOR IP): Performed by: INTERNAL MEDICINE

## 2025-01-18 PROCEDURE — 85025 COMPLETE CBC W/AUTO DIFF WBC: CPT

## 2025-01-18 PROCEDURE — 2700000000 HC OXYGEN THERAPY PER DAY

## 2025-01-18 PROCEDURE — 99291 CRITICAL CARE FIRST HOUR: CPT | Performed by: INTERNAL MEDICINE

## 2025-01-18 PROCEDURE — 80048 BASIC METABOLIC PNL TOTAL CA: CPT

## 2025-01-18 PROCEDURE — 94660 CPAP INITIATION&MGMT: CPT

## 2025-01-18 PROCEDURE — 85520 HEPARIN ASSAY: CPT

## 2025-01-18 PROCEDURE — 2000000000 HC ICU R&B

## 2025-01-18 PROCEDURE — 84100 ASSAY OF PHOSPHORUS: CPT

## 2025-01-18 PROCEDURE — 94761 N-INVAS EAR/PLS OXIMETRY MLT: CPT

## 2025-01-18 PROCEDURE — 93010 ELECTROCARDIOGRAM REPORT: CPT | Performed by: STUDENT IN AN ORGANIZED HEALTH CARE EDUCATION/TRAINING PROGRAM

## 2025-01-18 PROCEDURE — 94640 AIRWAY INHALATION TREATMENT: CPT

## 2025-01-18 PROCEDURE — 99233 SBSQ HOSP IP/OBS HIGH 50: CPT | Performed by: INTERNAL MEDICINE

## 2025-01-18 PROCEDURE — 2500000003 HC RX 250 WO HCPCS: Performed by: INTERNAL MEDICINE

## 2025-01-18 PROCEDURE — 2580000003 HC RX 258: Performed by: INTERNAL MEDICINE

## 2025-01-18 PROCEDURE — 93005 ELECTROCARDIOGRAM TRACING: CPT | Performed by: INTERNAL MEDICINE

## 2025-01-18 RX ORDER — LIDOCAINE 4 G/G
1 PATCH TOPICAL DAILY
Status: DISCONTINUED | OUTPATIENT
Start: 2025-01-18 | End: 2025-01-27

## 2025-01-18 RX ADMIN — WATER 40 MG: 1 INJECTION INTRAMUSCULAR; INTRAVENOUS; SUBCUTANEOUS at 08:55

## 2025-01-18 RX ADMIN — SODIUM CHLORIDE, PRESERVATIVE FREE 10 ML: 5 INJECTION INTRAVENOUS at 08:57

## 2025-01-18 RX ADMIN — METOPROLOL TARTRATE 12.5 MG: 25 TABLET, FILM COATED ORAL at 21:14

## 2025-01-18 RX ADMIN — METOPROLOL TARTRATE 12.5 MG: 25 TABLET, FILM COATED ORAL at 09:01

## 2025-01-18 RX ADMIN — IPRATROPIUM BROMIDE AND ALBUTEROL SULFATE 1 DOSE: 2.5; .5 SOLUTION RESPIRATORY (INHALATION) at 19:53

## 2025-01-18 RX ADMIN — ONDANSETRON 4 MG: 2 INJECTION INTRAMUSCULAR; INTRAVENOUS at 12:56

## 2025-01-18 RX ADMIN — WATER 40 MG: 1 INJECTION INTRAMUSCULAR; INTRAVENOUS; SUBCUTANEOUS at 21:50

## 2025-01-18 RX ADMIN — ONDANSETRON 4 MG: 2 INJECTION INTRAMUSCULAR; INTRAVENOUS at 21:08

## 2025-01-18 RX ADMIN — Medication 40 MG: at 08:54

## 2025-01-18 RX ADMIN — LEVOTHYROXINE SODIUM 25 MCG: 25 TABLET ORAL at 06:56

## 2025-01-18 RX ADMIN — IPRATROPIUM BROMIDE AND ALBUTEROL SULFATE 1 DOSE: 2.5; .5 SOLUTION RESPIRATORY (INHALATION) at 15:51

## 2025-01-18 RX ADMIN — MICONAZOLE NITRATE: 2 OINTMENT TOPICAL at 08:56

## 2025-01-18 RX ADMIN — IPRATROPIUM BROMIDE AND ALBUTEROL SULFATE 1 DOSE: 2.5; .5 SOLUTION RESPIRATORY (INHALATION) at 12:21

## 2025-01-18 RX ADMIN — BUSPIRONE HYDROCHLORIDE 7.5 MG: 7.5 TABLET ORAL at 08:56

## 2025-01-18 RX ADMIN — BUSPIRONE HYDROCHLORIDE 7.5 MG: 7.5 TABLET ORAL at 21:05

## 2025-01-18 RX ADMIN — BUDESONIDE 500 MCG: 0.5 INHALANT RESPIRATORY (INHALATION) at 19:53

## 2025-01-18 RX ADMIN — ATORVASTATIN CALCIUM 20 MG: 10 TABLET, FILM COATED ORAL at 21:04

## 2025-01-18 RX ADMIN — DEXMEDETOMIDINE HYDROCHLORIDE 0.2 MCG/KG/HR: 400 INJECTION, SOLUTION INTRAVENOUS at 12:49

## 2025-01-18 RX ADMIN — MICONAZOLE NITRATE: 2 OINTMENT TOPICAL at 21:13

## 2025-01-18 RX ADMIN — SODIUM CHLORIDE, PRESERVATIVE FREE 10 ML: 5 INJECTION INTRAVENOUS at 21:14

## 2025-01-18 RX ADMIN — Medication 400 MG: at 08:56

## 2025-01-18 RX ADMIN — DEXMEDETOMIDINE HYDROCHLORIDE 0.2 MCG/KG/HR: 400 INJECTION, SOLUTION INTRAVENOUS at 21:54

## 2025-01-18 RX ADMIN — MIDODRINE HYDROCHLORIDE 5 MG: 5 TABLET ORAL at 14:51

## 2025-01-18 RX ADMIN — ESCITALOPRAM OXALATE 10 MG: 10 TABLET ORAL at 08:55

## 2025-01-18 RX ADMIN — Medication 40 MG: at 21:05

## 2025-01-18 RX ADMIN — ONDANSETRON 4 MG: 2 INJECTION INTRAMUSCULAR; INTRAVENOUS at 02:08

## 2025-01-18 RX ADMIN — IPRATROPIUM BROMIDE AND ALBUTEROL SULFATE 1 DOSE: 2.5; .5 SOLUTION RESPIRATORY (INHALATION) at 07:41

## 2025-01-18 RX ADMIN — BUDESONIDE 500 MCG: 0.5 INHALANT RESPIRATORY (INHALATION) at 07:41

## 2025-01-18 ASSESSMENT — PAIN DESCRIPTION - ORIENTATION
ORIENTATION: LEFT
ORIENTATION: LEFT

## 2025-01-18 ASSESSMENT — PAIN DESCRIPTION - LOCATION
LOCATION: HIP
LOCATION: HIP;LEG

## 2025-01-18 ASSESSMENT — PAIN SCALES - GENERAL
PAINLEVEL_OUTOF10: 4
PAINLEVEL_OUTOF10: 0
PAINLEVEL_OUTOF10: 3

## 2025-01-18 ASSESSMENT — PAIN DESCRIPTION - PAIN TYPE: TYPE: ACUTE PAIN

## 2025-01-18 ASSESSMENT — PAIN DESCRIPTION - DESCRIPTORS: DESCRIPTORS: ACHING

## 2025-01-19 ENCOUNTER — APPOINTMENT (OUTPATIENT)
Dept: CT IMAGING | Age: 85
DRG: 444 | End: 2025-01-19
Payer: MEDICARE

## 2025-01-19 ENCOUNTER — APPOINTMENT (OUTPATIENT)
Dept: GENERAL RADIOLOGY | Age: 85
DRG: 444 | End: 2025-01-19
Payer: MEDICARE

## 2025-01-19 PROBLEM — G93.40 ACUTE ENCEPHALOPATHY: Status: ACTIVE | Noted: 2025-01-19

## 2025-01-19 PROBLEM — D62 ACUTE BLOOD LOSS ANEMIA: Status: ACTIVE | Noted: 2025-01-19

## 2025-01-19 PROBLEM — K68.3 RETROPERITONEAL HEMATOMA: Status: ACTIVE | Noted: 2025-01-19

## 2025-01-19 LAB
ABO + RH BLD: NORMAL
ALBUMIN SERPL-MCNC: 2.2 G/DL (ref 3.4–5)
ANION GAP SERPL CALCULATED.3IONS-SCNC: 5 MMOL/L (ref 3–16)
ANION GAP SERPL CALCULATED.3IONS-SCNC: 8 MMOL/L (ref 3–16)
ANISOCYTOSIS BLD QL SMEAR: ABNORMAL
ANTI-XA UNFRAC HEPARIN: 0.49 IU/ML (ref 0.3–0.7)
ANTIBODY SCREEN: NORMAL
BACTERIA BLD CULT: NORMAL
BACTERIA BLD CULT: NORMAL
BACTERIA URNS QL MICRO: ABNORMAL /HPF
BASE EXCESS BLDA CALC-SCNC: -2.1 MMOL/L (ref -3–3)
BASE EXCESS BLDV CALC-SCNC: 0.8 MMOL/L (ref -3–3)
BASOPHILS # BLD: 0 K/UL (ref 0–0.2)
BASOPHILS # BLD: 0 K/UL (ref 0–0.2)
BASOPHILS NFR BLD: 0 %
BASOPHILS NFR BLD: 0 %
BILIRUB UR QL STRIP.AUTO: NEGATIVE
BLOOD BANK DISPENSE STATUS: NORMAL
BLOOD BANK PRODUCT CODE: NORMAL
BPU ID: NORMAL
BUN SERPL-MCNC: 33 MG/DL (ref 7–20)
BUN SERPL-MCNC: 38 MG/DL (ref 7–20)
CALCIUM SERPL-MCNC: 7.1 MG/DL (ref 8.3–10.6)
CALCIUM SERPL-MCNC: 7.6 MG/DL (ref 8.3–10.6)
CHLORIDE SERPL-SCNC: 104 MMOL/L (ref 99–110)
CHLORIDE SERPL-SCNC: 99 MMOL/L (ref 99–110)
CLARITY UR: ABNORMAL
CO2 BLDA-SCNC: 26.1 MMOL/L
CO2 BLDV-SCNC: 29 MMOL/L
CO2 SERPL-SCNC: 32 MMOL/L (ref 21–32)
CO2 SERPL-SCNC: 37 MMOL/L (ref 21–32)
COHGB MFR BLDA: 0.1 % (ref 0–1.5)
COHGB MFR BLDV: 0.9 % (ref 0–1.5)
COLOR UR: YELLOW
CREAT SERPL-MCNC: 1.3 MG/DL (ref 0.6–1.2)
CREAT SERPL-MCNC: 1.8 MG/DL (ref 0.6–1.2)
CRYSTALS URNS MICRO: ABNORMAL /HPF
DEPRECATED RDW RBC AUTO: 15 % (ref 12.4–15.4)
DEPRECATED RDW RBC AUTO: 17.3 % (ref 12.4–15.4)
DESCRIPTION BLOOD BANK: NORMAL
EOSINOPHIL # BLD: 0 K/UL (ref 0–0.6)
EOSINOPHIL # BLD: 0 K/UL (ref 0–0.6)
EOSINOPHIL NFR BLD: 0 %
EOSINOPHIL NFR BLD: 0 %
EPI CELLS #/AREA URNS HPF: ABNORMAL /HPF (ref 0–5)
GFR SERPLBLD CREATININE-BSD FMLA CKD-EPI: 28 ML/MIN/{1.73_M2}
GFR SERPLBLD CREATININE-BSD FMLA CKD-EPI: 41 ML/MIN/{1.73_M2}
GLUCOSE BLD-MCNC: 145 MG/DL (ref 70–99)
GLUCOSE BLD-MCNC: 148 MG/DL (ref 70–99)
GLUCOSE BLD-MCNC: 169 MG/DL (ref 70–99)
GLUCOSE BLD-MCNC: 213 MG/DL (ref 70–99)
GLUCOSE BLD-MCNC: 249 MG/DL (ref 70–99)
GLUCOSE BLD-MCNC: 263 MG/DL (ref 70–99)
GLUCOSE SERPL-MCNC: 156 MG/DL (ref 70–99)
GLUCOSE SERPL-MCNC: 201 MG/DL (ref 70–99)
GLUCOSE UR STRIP.AUTO-MCNC: NEGATIVE MG/DL
HCO3 BLDA-SCNC: 24.6 MMOL/L (ref 21–29)
HCO3 BLDV-SCNC: 27.1 MMOL/L (ref 23–29)
HCT VFR BLD AUTO: 15.3 % (ref 36–48)
HCT VFR BLD AUTO: 39.7 % (ref 36–48)
HGB BLD-MCNC: 12.8 G/DL (ref 12–16)
HGB BLD-MCNC: 4.4 G/DL (ref 12–16)
HGB BLDA-MCNC: 12.1 G/DL (ref 12–16)
HGB UR QL STRIP.AUTO: ABNORMAL
HYALINE CASTS #/AREA URNS LPF: ABNORMAL /LPF (ref 0–2)
HYPOCHROMIA BLD QL SMEAR: ABNORMAL
INR PPP: 1.14 (ref 0.85–1.15)
KETONES UR STRIP.AUTO-MCNC: NEGATIVE MG/DL
LACTATE BLDV-SCNC: 2.7 MMOL/L (ref 0.4–2)
LEUKOCYTE ESTERASE UR QL STRIP.AUTO: NEGATIVE
LYMPHOCYTES # BLD: 0.4 K/UL (ref 1–5.1)
LYMPHOCYTES # BLD: 2.5 K/UL (ref 1–5.1)
LYMPHOCYTES NFR BLD: 1 %
LYMPHOCYTES NFR BLD: 11 %
MCH RBC QN AUTO: 27.7 PG (ref 26–34)
MCH RBC QN AUTO: 29.7 PG (ref 26–34)
MCHC RBC AUTO-ENTMCNC: 28.9 G/DL (ref 31–36)
MCHC RBC AUTO-ENTMCNC: 32.4 G/DL (ref 31–36)
MCV RBC AUTO: 91.6 FL (ref 80–100)
MCV RBC AUTO: 95.7 FL (ref 80–100)
METAMYELOCYTES NFR BLD MANUAL: 1 %
METHGB MFR BLDA: 0.1 %
METHGB MFR BLDV: 0.3 %
MONOCYTES # BLD: 0.2 K/UL (ref 0–1.3)
MONOCYTES # BLD: 0.4 K/UL (ref 0–1.3)
MONOCYTES NFR BLD: 1 %
MONOCYTES NFR BLD: 1 %
NEUTROPHILS # BLD: 18.3 K/UL (ref 1.7–7.7)
NEUTROPHILS # BLD: 43.1 K/UL (ref 1.7–7.7)
NEUTROPHILS NFR BLD: 85 %
NEUTROPHILS NFR BLD: 98 %
NEUTS BAND NFR BLD MANUAL: 1 % (ref 0–7)
NITRITE UR QL STRIP.AUTO: NEGATIVE
O2 CT VFR BLDV CALC: 7 VOL %
O2 THERAPY: ABNORMAL
O2 THERAPY: ABNORMAL
OVALOCYTES BLD QL SMEAR: ABNORMAL
PATH INTERP BLD-IMP: YES
PCO2 BLDA: 50 MMHG (ref 35–45)
PCO2 BLDV: 54.8 MMHG (ref 40–50)
PERFORMED ON: ABNORMAL
PH BLDA: 7.31 [PH] (ref 7.35–7.45)
PH BLDV: 7.31 [PH] (ref 7.35–7.45)
PH UR STRIP.AUTO: 6 [PH] (ref 5–8)
PHOSPHATE SERPL-MCNC: 1.9 MG/DL (ref 2.5–4.9)
PHOSPHATE SERPL-MCNC: 3.1 MG/DL (ref 2.5–4.9)
PLATELET # BLD AUTO: 196 K/UL (ref 135–450)
PLATELET # BLD AUTO: 98 K/UL (ref 135–450)
PLATELET BLD QL SMEAR: ABNORMAL
PLATELET BLD QL SMEAR: ADEQUATE
PMV BLD AUTO: 9.1 FL (ref 5–10.5)
PMV BLD AUTO: 9.3 FL (ref 5–10.5)
PO2 BLDA: 419.5 MMHG (ref 75–108)
PO2 BLDV: 67.7 MMHG (ref 25–40)
POLYCHROMASIA BLD QL SMEAR: ABNORMAL
POTASSIUM SERPL-SCNC: 4.3 MMOL/L (ref 3.5–5.1)
POTASSIUM SERPL-SCNC: 4.5 MMOL/L (ref 3.5–5.1)
PROCALCITONIN SERPL IA-MCNC: 0.31 NG/ML (ref 0–0.15)
PROT UR STRIP.AUTO-MCNC: 30 MG/DL
PROTHROMBIN TIME: 14.8 SEC (ref 11.9–14.9)
RBC # BLD AUTO: 1.6 M/UL (ref 4–5.2)
RBC # BLD AUTO: 4.33 M/UL (ref 4–5.2)
RBC #/AREA URNS HPF: ABNORMAL /HPF (ref 0–4)
RENAL EPI CELLS #/AREA UR COMP ASSIST: ABNORMAL /HPF (ref 0–1)
ROULEAUX BLD QL SMEAR: ABNORMAL
SAO2 % BLDA: 99.8 %
SAO2 % BLDV: 92 %
SCHISTOCYTES BLD QL SMEAR: ABNORMAL
SLIDE REVIEW: ABNORMAL
SLIDE REVIEW: ABNORMAL
SODIUM SERPL-SCNC: 141 MMOL/L (ref 136–145)
SODIUM SERPL-SCNC: 144 MMOL/L (ref 136–145)
SP GR UR STRIP.AUTO: 1.02 (ref 1–1.03)
UA COMPLETE W REFLEX CULTURE PNL UR: ABNORMAL
UA DIPSTICK W REFLEX MICRO PNL UR: YES
URN SPEC COLLECT METH UR: ABNORMAL
UROBILINOGEN UR STRIP-ACNC: 0.2 E.U./DL
VARIANT LYMPHS NFR BLD MANUAL: 1 % (ref 0–6)
WBC # BLD AUTO: 21 K/UL (ref 4–11)
WBC # BLD AUTO: 44 K/UL (ref 4–11)
WBC #/AREA URNS HPF: ABNORMAL /HPF (ref 0–5)

## 2025-01-19 PROCEDURE — 6360000002 HC RX W HCPCS: Performed by: INTERNAL MEDICINE

## 2025-01-19 PROCEDURE — 71045 X-RAY EXAM CHEST 1 VIEW: CPT

## 2025-01-19 PROCEDURE — 2500000003 HC RX 250 WO HCPCS: Performed by: INTERNAL MEDICINE

## 2025-01-19 PROCEDURE — 86850 RBC ANTIBODY SCREEN: CPT

## 2025-01-19 PROCEDURE — 86923 COMPATIBILITY TEST ELECTRIC: CPT

## 2025-01-19 PROCEDURE — 6370000000 HC RX 637 (ALT 250 FOR IP): Performed by: INTERNAL MEDICINE

## 2025-01-19 PROCEDURE — 2500000003 HC RX 250 WO HCPCS

## 2025-01-19 PROCEDURE — 85610 PROTHROMBIN TIME: CPT

## 2025-01-19 PROCEDURE — 82803 BLOOD GASES ANY COMBINATION: CPT

## 2025-01-19 PROCEDURE — 74176 CT ABD & PELVIS W/O CONTRAST: CPT

## 2025-01-19 PROCEDURE — 94002 VENT MGMT INPAT INIT DAY: CPT

## 2025-01-19 PROCEDURE — 2700000000 HC OXYGEN THERAPY PER DAY

## 2025-01-19 PROCEDURE — 85520 HEPARIN ASSAY: CPT

## 2025-01-19 PROCEDURE — 2580000003 HC RX 258: Performed by: INTERNAL MEDICINE

## 2025-01-19 PROCEDURE — 51702 INSERT TEMP BLADDER CATH: CPT

## 2025-01-19 PROCEDURE — 86901 BLOOD TYPING SEROLOGIC RH(D): CPT

## 2025-01-19 PROCEDURE — 99233 SBSQ HOSP IP/OBS HIGH 50: CPT | Performed by: INTERNAL MEDICINE

## 2025-01-19 PROCEDURE — 36620 INSERTION CATHETER ARTERY: CPT | Performed by: INTERNAL MEDICINE

## 2025-01-19 PROCEDURE — 81001 URINALYSIS AUTO W/SCOPE: CPT

## 2025-01-19 PROCEDURE — 2000000000 HC ICU R&B

## 2025-01-19 PROCEDURE — 94660 CPAP INITIATION&MGMT: CPT

## 2025-01-19 PROCEDURE — 84145 PROCALCITONIN (PCT): CPT

## 2025-01-19 PROCEDURE — 85025 COMPLETE CBC W/AUTO DIFF WBC: CPT

## 2025-01-19 PROCEDURE — 36430 TRANSFUSION BLD/BLD COMPNT: CPT

## 2025-01-19 PROCEDURE — 03HY32Z INSERTION OF MONITORING DEVICE INTO UPPER ARTERY, PERCUTANEOUS APPROACH: ICD-10-PCS | Performed by: INTERNAL MEDICINE

## 2025-01-19 PROCEDURE — 94761 N-INVAS EAR/PLS OXIMETRY MLT: CPT

## 2025-01-19 PROCEDURE — P9016 RBC LEUKOCYTES REDUCED: HCPCS

## 2025-01-19 PROCEDURE — 94640 AIRWAY INHALATION TREATMENT: CPT

## 2025-01-19 PROCEDURE — 36415 COLL VENOUS BLD VENIPUNCTURE: CPT

## 2025-01-19 PROCEDURE — 31500 INSERT EMERGENCY AIRWAY: CPT | Performed by: INTERNAL MEDICINE

## 2025-01-19 PROCEDURE — 80069 RENAL FUNCTION PANEL: CPT

## 2025-01-19 PROCEDURE — 83605 ASSAY OF LACTIC ACID: CPT

## 2025-01-19 PROCEDURE — 99291 CRITICAL CARE FIRST HOUR: CPT | Performed by: INTERNAL MEDICINE

## 2025-01-19 PROCEDURE — 0BH17EZ INSERTION OF ENDOTRACHEAL AIRWAY INTO TRACHEA, VIA NATURAL OR ARTIFICIAL OPENING: ICD-10-PCS | Performed by: INTERNAL MEDICINE

## 2025-01-19 PROCEDURE — 70450 CT HEAD/BRAIN W/O DYE: CPT

## 2025-01-19 PROCEDURE — 86900 BLOOD TYPING SEROLOGIC ABO: CPT

## 2025-01-19 PROCEDURE — 84100 ASSAY OF PHOSPHORUS: CPT

## 2025-01-19 RX ORDER — PROPOFOL 10 MG/ML
5-50 INJECTION, EMULSION INTRAVENOUS CONTINUOUS
Status: DISCONTINUED | OUTPATIENT
Start: 2025-01-19 | End: 2025-01-23

## 2025-01-19 RX ORDER — FENTANYL CITRATE 50 UG/ML
50 INJECTION, SOLUTION INTRAMUSCULAR; INTRAVENOUS
Status: DISCONTINUED | OUTPATIENT
Start: 2025-01-19 | End: 2025-01-23

## 2025-01-19 RX ORDER — ROCURONIUM BROMIDE 10 MG/ML
0.6 INJECTION, SOLUTION INTRAVENOUS ONCE
Status: COMPLETED | OUTPATIENT
Start: 2025-01-19 | End: 2025-01-19

## 2025-01-19 RX ORDER — METRONIDAZOLE 500 MG/100ML
500 INJECTION, SOLUTION INTRAVENOUS EVERY 8 HOURS
Status: DISCONTINUED | OUTPATIENT
Start: 2025-01-19 | End: 2025-01-27

## 2025-01-19 RX ORDER — 0.9 % SODIUM CHLORIDE 0.9 %
500 INTRAVENOUS SOLUTION INTRAVENOUS
Status: DISCONTINUED | OUTPATIENT
Start: 2025-01-19 | End: 2025-01-27 | Stop reason: HOSPADM

## 2025-01-19 RX ORDER — PROPOFOL 10 MG/ML
INJECTION, EMULSION INTRAVENOUS
Status: DISPENSED
Start: 2025-01-19 | End: 2025-01-20

## 2025-01-19 RX ORDER — VANCOMYCIN 1 G/200ML
1000 INJECTION, SOLUTION INTRAVENOUS ONCE
Status: COMPLETED | OUTPATIENT
Start: 2025-01-19 | End: 2025-01-19

## 2025-01-19 RX ORDER — KETAMINE HCL 50MG/ML(1)
1 SYRINGE (ML) INTRAVENOUS ONCE
Status: DISCONTINUED | OUTPATIENT
Start: 2025-01-19 | End: 2025-01-21

## 2025-01-19 RX ORDER — KETAMINE HYDROCHLORIDE 100 MG/ML
INJECTION, SOLUTION INTRAMUSCULAR; INTRAVENOUS
Status: COMPLETED
Start: 2025-01-19 | End: 2025-01-19

## 2025-01-19 RX ORDER — 0.9 % SODIUM CHLORIDE 0.9 %
500 INTRAVENOUS SOLUTION INTRAVENOUS ONCE
Status: COMPLETED | OUTPATIENT
Start: 2025-01-19 | End: 2025-01-19

## 2025-01-19 RX ORDER — 0.9 % SODIUM CHLORIDE 0.9 %
500 INTRAVENOUS SOLUTION INTRAVENOUS ONCE
Status: DISCONTINUED | OUTPATIENT
Start: 2025-01-19 | End: 2025-01-27

## 2025-01-19 RX ORDER — MIDAZOLAM HYDROCHLORIDE 1 MG/ML
2 INJECTION, SOLUTION INTRAMUSCULAR; INTRAVENOUS
Status: DISCONTINUED | OUTPATIENT
Start: 2025-01-19 | End: 2025-01-23

## 2025-01-19 RX ORDER — NOREPINEPHRINE BITARTRATE 0.06 MG/ML
1-100 INJECTION, SOLUTION INTRAVENOUS CONTINUOUS
Status: DISCONTINUED | OUTPATIENT
Start: 2025-01-19 | End: 2025-01-27

## 2025-01-19 RX ORDER — SODIUM CHLORIDE 9 MG/ML
INJECTION, SOLUTION INTRAVENOUS PRN
Status: DISCONTINUED | OUTPATIENT
Start: 2025-01-19 | End: 2025-01-27 | Stop reason: HOSPADM

## 2025-01-19 RX ORDER — SODIUM CHLORIDE 9 MG/ML
INJECTION, SOLUTION INTRAVENOUS PRN
Status: DISCONTINUED | OUTPATIENT
Start: 2025-01-19 | End: 2025-01-27

## 2025-01-19 RX ADMIN — BUSPIRONE HYDROCHLORIDE 7.5 MG: 7.5 TABLET ORAL at 09:48

## 2025-01-19 RX ADMIN — LEVOTHYROXINE SODIUM 25 MCG: 25 TABLET ORAL at 06:03

## 2025-01-19 RX ADMIN — ACETAMINOPHEN 650 MG: 325 TABLET ORAL at 01:13

## 2025-01-19 RX ADMIN — Medication 400 MG: at 09:48

## 2025-01-19 RX ADMIN — IPRATROPIUM BROMIDE AND ALBUTEROL SULFATE 1 DOSE: 2.5; .5 SOLUTION RESPIRATORY (INHALATION) at 19:52

## 2025-01-19 RX ADMIN — HEPARIN SODIUM 18 UNITS/KG/HR: 10000 INJECTION, SOLUTION INTRAVENOUS at 06:08

## 2025-01-19 RX ADMIN — Medication 40 MG: at 20:23

## 2025-01-19 RX ADMIN — IPRATROPIUM BROMIDE AND ALBUTEROL SULFATE 1 DOSE: 2.5; .5 SOLUTION RESPIRATORY (INHALATION) at 07:58

## 2025-01-19 RX ADMIN — WATER 40 MG: 1 INJECTION INTRAMUSCULAR; INTRAVENOUS; SUBCUTANEOUS at 23:40

## 2025-01-19 RX ADMIN — IPRATROPIUM BROMIDE AND ALBUTEROL SULFATE 1 DOSE: 2.5; .5 SOLUTION RESPIRATORY (INHALATION) at 15:00

## 2025-01-19 RX ADMIN — CEFEPIME 1000 MG: 1 INJECTION, POWDER, FOR SOLUTION INTRAMUSCULAR; INTRAVENOUS at 15:13

## 2025-01-19 RX ADMIN — Medication 40 MG: at 09:47

## 2025-01-19 RX ADMIN — SODIUM CHLORIDE 500 ML: 9 INJECTION, SOLUTION INTRAVENOUS at 08:29

## 2025-01-19 RX ADMIN — PROTAMINE SULFATE 20 MG: 10 INJECTION, SOLUTION INTRAVENOUS at 10:40

## 2025-01-19 RX ADMIN — WATER 40 MG: 1 INJECTION INTRAMUSCULAR; INTRAVENOUS; SUBCUTANEOUS at 09:47

## 2025-01-19 RX ADMIN — METRONIDAZOLE 500 MG: 500 INJECTION, SOLUTION INTRAVENOUS at 20:12

## 2025-01-19 RX ADMIN — MICONAZOLE NITRATE: 2 OINTMENT TOPICAL at 09:00

## 2025-01-19 RX ADMIN — PROPOFOL 20 MCG/KG/MIN: 10 INJECTION, EMULSION INTRAVENOUS at 12:55

## 2025-01-19 RX ADMIN — NOREPINEPHRINE BITARTRATE 15 MCG/MIN: 0.06 INJECTION, SOLUTION INTRAVENOUS at 23:57

## 2025-01-19 RX ADMIN — ATORVASTATIN CALCIUM 20 MG: 10 TABLET, FILM COATED ORAL at 20:23

## 2025-01-19 RX ADMIN — NOREPINEPHRINE BITARTRATE 5 MCG/MIN: 0.06 INJECTION, SOLUTION INTRAVENOUS at 08:33

## 2025-01-19 RX ADMIN — SODIUM CHLORIDE, PRESERVATIVE FREE 10 ML: 5 INJECTION INTRAVENOUS at 09:51

## 2025-01-19 RX ADMIN — ESCITALOPRAM OXALATE 10 MG: 10 TABLET ORAL at 09:48

## 2025-01-19 RX ADMIN — ROCURONIUM BROMIDE 27 MG: 10 INJECTION, SOLUTION INTRAVENOUS at 13:11

## 2025-01-19 RX ADMIN — CEFEPIME 1000 MG: 1 INJECTION, POWDER, FOR SOLUTION INTRAMUSCULAR; INTRAVENOUS at 23:42

## 2025-01-19 RX ADMIN — DOCUSATE SODIUM 100 MG: 100 CAPSULE, LIQUID FILLED ORAL at 09:48

## 2025-01-19 RX ADMIN — IPRATROPIUM BROMIDE AND ALBUTEROL SULFATE 1 DOSE: 2.5; .5 SOLUTION RESPIRATORY (INHALATION) at 11:53

## 2025-01-19 RX ADMIN — KETAMINE HYDROCHLORIDE 45 MG: 100 INJECTION INTRAMUSCULAR; INTRAVENOUS at 13:08

## 2025-01-19 RX ADMIN — ONDANSETRON 4 MG: 2 INJECTION INTRAMUSCULAR; INTRAVENOUS at 07:42

## 2025-01-19 RX ADMIN — MICONAZOLE NITRATE: 2 OINTMENT TOPICAL at 20:24

## 2025-01-19 RX ADMIN — BUDESONIDE 500 MCG: 0.5 INHALANT RESPIRATORY (INHALATION) at 07:58

## 2025-01-19 RX ADMIN — MIDODRINE HYDROCHLORIDE 5 MG: 5 TABLET ORAL at 02:24

## 2025-01-19 RX ADMIN — NOREPINEPHRINE BITARTRATE 20 MCG/MIN: 0.06 INJECTION, SOLUTION INTRAVENOUS at 13:54

## 2025-01-19 RX ADMIN — VANCOMYCIN 1000 MG: 1 INJECTION, SOLUTION INTRAVENOUS at 16:47

## 2025-01-19 RX ADMIN — BUSPIRONE HYDROCHLORIDE 7.5 MG: 7.5 TABLET ORAL at 20:23

## 2025-01-19 RX ADMIN — BUDESONIDE 500 MCG: 0.5 INHALANT RESPIRATORY (INHALATION) at 19:52

## 2025-01-19 RX ADMIN — LORAZEPAM 0.5 MG: 2 INJECTION INTRAMUSCULAR; INTRAVENOUS at 05:43

## 2025-01-19 RX ADMIN — PROPOFOL 15 MCG/KG/MIN: 10 INJECTION, EMULSION INTRAVENOUS at 15:15

## 2025-01-19 ASSESSMENT — PULMONARY FUNCTION TESTS
PIF_VALUE: 28
PIF_VALUE: 26
PIF_VALUE: 21
PIF_VALUE: 31

## 2025-01-19 ASSESSMENT — PAIN DESCRIPTION - DESCRIPTORS: DESCRIPTORS: HEAVINESS;SORE

## 2025-01-19 ASSESSMENT — PAIN SCALES - WONG BAKER: WONGBAKER_NUMERICALRESPONSE: NO HURT

## 2025-01-19 ASSESSMENT — PAIN - FUNCTIONAL ASSESSMENT: PAIN_FUNCTIONAL_ASSESSMENT: PREVENTS OR INTERFERES SOME ACTIVE ACTIVITIES AND ADLS

## 2025-01-19 ASSESSMENT — PAIN DESCRIPTION - ORIENTATION: ORIENTATION: RIGHT;LEFT

## 2025-01-19 ASSESSMENT — PAIN DESCRIPTION - LOCATION: LOCATION: LEG

## 2025-01-19 ASSESSMENT — PAIN SCALES - GENERAL: PAINLEVEL_OUTOF10: 0

## 2025-01-19 NOTE — PROCEDURES
ENDOTRACHEAL INTUBATION    INDICATION: Life threatening respiratory failure, airway protection    CONSENT: Obtained from daughter at the bedside    TIME OUT: Taken    SEDATION: Fentanyl, Versed, ketamine, rocuronium    PROCEDURE: Using video-assisted laryngoscopy, the vocal cords were well visualized, and a 7.5 mm cuffed endotracheal tube was placed directly through the cords.  Number of attempts #1. good breath sounds were auscultated bilaterally, with no sounds detected over the abdomen. There was a good return of ETCO2 on the monitor. The patient tolerated the procedure well, with no immediate complications. A chest X-ray ET tube 4.4 cm above brayden, discussed with RT to advance 1 cm.

## 2025-01-19 NOTE — PROCEDURES
Procedure: Arterial line placement.     Indication: invasive hemodynamic monitoring, shook    Informed Consent: was obtained from family. Informed consent was obtained from the family.  Risks of  hemorrhage, arrhythmia, infection and adverse drug reaction were discussed.      Time Out: taken    Procedure: Sterile prep with chlorhexidine.  Full maximum sterile field/barrier technique was followed (with cap and mask and sterile gown and large sterile sheet and hand hygeine and 2% chlorhexidine).  Aqueous lidocaine anesthetic.  ReSound guided, right femoral artery was accessed with ease with bright red blood return. No immediate complication.    Recommendation: BP monitoring and wean pressors targeting MAP>65.

## 2025-01-20 ENCOUNTER — HOSPITAL ENCOUNTER (INPATIENT)
Age: 85
Discharge: HOME | DRG: 444 | End: 2025-01-22
Attending: STUDENT IN AN ORGANIZED HEALTH CARE EDUCATION/TRAINING PROGRAM
Payer: MEDICARE

## 2025-01-20 ENCOUNTER — APPOINTMENT (OUTPATIENT)
Dept: GENERAL RADIOLOGY | Age: 85
DRG: 444 | End: 2025-01-20
Payer: MEDICARE

## 2025-01-20 PROBLEM — Z86.73 HISTORY OF TIA (TRANSIENT ISCHEMIC ATTACK): Status: ACTIVE | Noted: 2025-01-20

## 2025-01-20 PROBLEM — R93.0 ABNORMAL HEAD CT: Status: ACTIVE | Noted: 2025-01-20

## 2025-01-20 LAB
ANION GAP SERPL CALCULATED.3IONS-SCNC: 13 MMOL/L (ref 3–16)
ANION GAP SERPL CALCULATED.3IONS-SCNC: 13 MMOL/L (ref 3–16)
ANION GAP SERPL CALCULATED.3IONS-SCNC: 14 MMOL/L (ref 3–16)
ANION GAP SERPL CALCULATED.3IONS-SCNC: 15 MMOL/L (ref 3–16)
ANTI-XA UNFRAC HEPARIN: <0.1 IU/ML (ref 0.3–0.7)
BASE EXCESS BLDA CALC-SCNC: 2.5 MMOL/L (ref -3–3)
BASOPHILS # BLD: 0.1 K/UL (ref 0–0.2)
BASOPHILS NFR BLD: 0.2 %
BUN SERPL-MCNC: 49 MG/DL (ref 7–20)
BUN SERPL-MCNC: 51 MG/DL (ref 7–20)
BUN SERPL-MCNC: 55 MG/DL (ref 7–20)
BUN SERPL-MCNC: 57 MG/DL (ref 7–20)
CALCIUM SERPL-MCNC: 7.4 MG/DL (ref 8.3–10.6)
CALCIUM SERPL-MCNC: 7.5 MG/DL (ref 8.3–10.6)
CHLORIDE SERPL-SCNC: 100 MMOL/L (ref 99–110)
CHLORIDE SERPL-SCNC: 100 MMOL/L (ref 99–110)
CHLORIDE SERPL-SCNC: 102 MMOL/L (ref 99–110)
CHLORIDE SERPL-SCNC: 99 MMOL/L (ref 99–110)
CO2 BLDA-SCNC: 26.8 MMOL/L
CO2 SERPL-SCNC: 23 MMOL/L (ref 21–32)
CO2 SERPL-SCNC: 26 MMOL/L (ref 21–32)
COHGB MFR BLDA: 0.8 % (ref 0–1.5)
CREAT SERPL-MCNC: 2.3 MG/DL (ref 0.6–1.2)
CREAT SERPL-MCNC: 2.4 MG/DL (ref 0.6–1.2)
CREAT SERPL-MCNC: 2.7 MG/DL (ref 0.6–1.2)
CREAT SERPL-MCNC: 2.8 MG/DL (ref 0.6–1.2)
DEPRECATED RDW RBC AUTO: 15.3 % (ref 12.4–15.4)
EOSINOPHIL # BLD: 0 K/UL (ref 0–0.6)
EOSINOPHIL NFR BLD: 0 %
GFR SERPLBLD CREATININE-BSD FMLA CKD-EPI: 16 ML/MIN/{1.73_M2}
GFR SERPLBLD CREATININE-BSD FMLA CKD-EPI: 17 ML/MIN/{1.73_M2}
GFR SERPLBLD CREATININE-BSD FMLA CKD-EPI: 20 ML/MIN/{1.73_M2}
GFR SERPLBLD CREATININE-BSD FMLA CKD-EPI: 21 ML/MIN/{1.73_M2}
GLUCOSE BLD-MCNC: 121 MG/DL (ref 70–99)
GLUCOSE BLD-MCNC: 126 MG/DL (ref 70–99)
GLUCOSE BLD-MCNC: 137 MG/DL (ref 70–99)
GLUCOSE BLD-MCNC: 146 MG/DL (ref 70–99)
GLUCOSE SERPL-MCNC: 148 MG/DL (ref 70–99)
GLUCOSE SERPL-MCNC: 165 MG/DL (ref 70–99)
GLUCOSE SERPL-MCNC: 171 MG/DL (ref 70–99)
GLUCOSE SERPL-MCNC: 190 MG/DL (ref 70–99)
HCO3 BLDA-SCNC: 25.7 MMOL/L (ref 21–29)
HCT VFR BLD AUTO: 28.4 % (ref 36–48)
HCT VFR BLD AUTO: 30 % (ref 36–48)
HCT VFR BLD AUTO: 30.7 % (ref 36–48)
HCT VFR BLD AUTO: 31.6 % (ref 36–48)
HGB BLD-MCNC: 10 G/DL (ref 12–16)
HGB BLD-MCNC: 10.3 G/DL (ref 12–16)
HGB BLD-MCNC: 10.6 G/DL (ref 12–16)
HGB BLD-MCNC: 9.3 G/DL (ref 12–16)
HGB BLDA-MCNC: 12.3 G/DL (ref 12–16)
INR PPP: 1.1 (ref 0.85–1.15)
LACTATE BLDV-SCNC: 2 MMOL/L (ref 0.4–2)
LACTATE BLDV-SCNC: 2 MMOL/L (ref 0.4–2)
LACTATE BLDV-SCNC: 2.3 MMOL/L (ref 0.4–2)
LACTATE BLDV-SCNC: 2.9 MMOL/L (ref 0.4–2)
LACTATE BLDV-SCNC: 3.1 MMOL/L (ref 0.4–2)
LYMPHOCYTES # BLD: 0.9 K/UL (ref 1–5.1)
LYMPHOCYTES NFR BLD: 2.4 %
MCH RBC QN AUTO: 30.2 PG (ref 26–34)
MCHC RBC AUTO-ENTMCNC: 33.4 G/DL (ref 31–36)
MCV RBC AUTO: 90.5 FL (ref 80–100)
METHGB MFR BLDA: 0.3 %
MONOCYTES # BLD: 0.6 K/UL (ref 0–1.3)
MONOCYTES NFR BLD: 1.6 %
MRSA DNA SPEC QL NAA+PROBE: ABNORMAL
NEUTROPHILS # BLD: 36.7 K/UL (ref 1.7–7.7)
NEUTROPHILS NFR BLD: 95.8 %
O2 THERAPY: ABNORMAL
ORGANISM: ABNORMAL
PATH INTERP BLD-IMP: NORMAL
PCO2 BLDA: 35.2 MMHG (ref 35–45)
PERFORMED ON: ABNORMAL
PH BLDA: 7.48 [PH] (ref 7.35–7.45)
PHOSPHATE SERPL-MCNC: 3.7 MG/DL (ref 2.5–4.9)
PLATELET # BLD AUTO: 126 K/UL (ref 135–450)
PMV BLD AUTO: 10.3 FL (ref 5–10.5)
PO2 BLDA: 78.4 MMHG (ref 75–108)
POTASSIUM SERPL-SCNC: 4.7 MMOL/L (ref 3.5–5.1)
POTASSIUM SERPL-SCNC: 4.7 MMOL/L (ref 3.5–5.1)
POTASSIUM SERPL-SCNC: 4.8 MMOL/L (ref 3.5–5.1)
PROCALCITONIN SERPL IA-MCNC: 8.77 NG/ML (ref 0–0.15)
PROTHROMBIN TIME: 14.5 SEC (ref 11.9–14.9)
RBC # BLD AUTO: 3.39 M/UL (ref 4–5.2)
SAO2 % BLDA: 96.4 %
SODIUM SERPL-SCNC: 136 MMOL/L (ref 136–145)
SODIUM SERPL-SCNC: 138 MMOL/L (ref 136–145)
SODIUM SERPL-SCNC: 141 MMOL/L (ref 136–145)
SODIUM SERPL-SCNC: 142 MMOL/L (ref 136–145)
VANCOMYCIN TROUGH SERPL-MCNC: 20.9 UG/ML (ref 10–20)
WBC # BLD AUTO: 38.3 K/UL (ref 4–11)

## 2025-01-20 PROCEDURE — 99222 1ST HOSP IP/OBS MODERATE 55: CPT | Performed by: STUDENT IN AN ORGANIZED HEALTH CARE EDUCATION/TRAINING PROGRAM

## 2025-01-20 PROCEDURE — 84100 ASSAY OF PHOSPHORUS: CPT

## 2025-01-20 PROCEDURE — 99291 CRITICAL CARE FIRST HOUR: CPT | Performed by: INTERNAL MEDICINE

## 2025-01-20 PROCEDURE — 94003 VENT MGMT INPAT SUBQ DAY: CPT

## 2025-01-20 PROCEDURE — 36415 COLL VENOUS BLD VENIPUNCTURE: CPT

## 2025-01-20 PROCEDURE — 2500000003 HC RX 250 WO HCPCS: Performed by: INTERNAL MEDICINE

## 2025-01-20 PROCEDURE — 87641 MR-STAPH DNA AMP PROBE: CPT

## 2025-01-20 PROCEDURE — 6360000002 HC RX W HCPCS: Performed by: INTERNAL MEDICINE

## 2025-01-20 PROCEDURE — 82803 BLOOD GASES ANY COMBINATION: CPT

## 2025-01-20 PROCEDURE — 6370000000 HC RX 637 (ALT 250 FOR IP): Performed by: INTERNAL MEDICINE

## 2025-01-20 PROCEDURE — 94761 N-INVAS EAR/PLS OXIMETRY MLT: CPT

## 2025-01-20 PROCEDURE — 99233 SBSQ HOSP IP/OBS HIGH 50: CPT | Performed by: SURGERY

## 2025-01-20 PROCEDURE — 85520 HEPARIN ASSAY: CPT

## 2025-01-20 PROCEDURE — 2580000003 HC RX 258: Performed by: INTERNAL MEDICINE

## 2025-01-20 PROCEDURE — 85014 HEMATOCRIT: CPT

## 2025-01-20 PROCEDURE — 93880 EXTRACRANIAL BILAT STUDY: CPT

## 2025-01-20 PROCEDURE — 94640 AIRWAY INHALATION TREATMENT: CPT

## 2025-01-20 PROCEDURE — 85025 COMPLETE CBC W/AUTO DIFF WBC: CPT

## 2025-01-20 PROCEDURE — 99233 SBSQ HOSP IP/OBS HIGH 50: CPT | Performed by: INTERNAL MEDICINE

## 2025-01-20 PROCEDURE — 85610 PROTHROMBIN TIME: CPT

## 2025-01-20 PROCEDURE — 2000000000 HC ICU R&B

## 2025-01-20 PROCEDURE — 80048 BASIC METABOLIC PNL TOTAL CA: CPT

## 2025-01-20 PROCEDURE — 85018 HEMOGLOBIN: CPT

## 2025-01-20 PROCEDURE — 84145 PROCALCITONIN (PCT): CPT

## 2025-01-20 PROCEDURE — 2700000000 HC OXYGEN THERAPY PER DAY

## 2025-01-20 PROCEDURE — 83605 ASSAY OF LACTIC ACID: CPT

## 2025-01-20 PROCEDURE — 71045 X-RAY EXAM CHEST 1 VIEW: CPT

## 2025-01-20 PROCEDURE — 80202 ASSAY OF VANCOMYCIN: CPT

## 2025-01-20 RX ORDER — SODIUM CHLORIDE 9 MG/ML
INJECTION, SOLUTION INTRAVENOUS CONTINUOUS
Status: DISCONTINUED | OUTPATIENT
Start: 2025-01-20 | End: 2025-01-23

## 2025-01-20 RX ADMIN — SODIUM CHLORIDE: 9 INJECTION, SOLUTION INTRAVENOUS at 11:48

## 2025-01-20 RX ADMIN — MICONAZOLE NITRATE: 2 OINTMENT TOPICAL at 20:22

## 2025-01-20 RX ADMIN — PROPOFOL 15 MCG/KG/MIN: 10 INJECTION, EMULSION INTRAVENOUS at 05:46

## 2025-01-20 RX ADMIN — ATORVASTATIN CALCIUM 20 MG: 10 TABLET, FILM COATED ORAL at 20:26

## 2025-01-20 RX ADMIN — VANCOMYCIN HYDROCHLORIDE 500 MG: 500 INJECTION, POWDER, LYOPHILIZED, FOR SOLUTION INTRAVENOUS at 12:07

## 2025-01-20 RX ADMIN — METRONIDAZOLE 500 MG: 500 INJECTION, SOLUTION INTRAVENOUS at 20:29

## 2025-01-20 RX ADMIN — Medication 40 MG: at 09:05

## 2025-01-20 RX ADMIN — BUSPIRONE HYDROCHLORIDE 7.5 MG: 7.5 TABLET ORAL at 09:05

## 2025-01-20 RX ADMIN — WATER 40 MG: 1 INJECTION INTRAMUSCULAR; INTRAVENOUS; SUBCUTANEOUS at 09:05

## 2025-01-20 RX ADMIN — METRONIDAZOLE 500 MG: 500 INJECTION, SOLUTION INTRAVENOUS at 04:26

## 2025-01-20 RX ADMIN — CEFEPIME 1000 MG: 1 INJECTION, POWDER, FOR SOLUTION INTRAMUSCULAR; INTRAVENOUS at 11:59

## 2025-01-20 RX ADMIN — IPRATROPIUM BROMIDE AND ALBUTEROL SULFATE 1 DOSE: 2.5; .5 SOLUTION RESPIRATORY (INHALATION) at 19:36

## 2025-01-20 RX ADMIN — SODIUM CHLORIDE, PRESERVATIVE FREE 10 ML: 5 INJECTION INTRAVENOUS at 09:06

## 2025-01-20 RX ADMIN — Medication 400 MG: at 09:05

## 2025-01-20 RX ADMIN — Medication 40 MG: at 20:27

## 2025-01-20 RX ADMIN — BUSPIRONE HYDROCHLORIDE 7.5 MG: 7.5 TABLET ORAL at 20:26

## 2025-01-20 RX ADMIN — SODIUM CHLORIDE, PRESERVATIVE FREE 10 ML: 5 INJECTION INTRAVENOUS at 20:22

## 2025-01-20 RX ADMIN — BUDESONIDE 500 MCG: 0.5 INHALANT RESPIRATORY (INHALATION) at 07:17

## 2025-01-20 RX ADMIN — LEVOTHYROXINE SODIUM 25 MCG: 25 TABLET ORAL at 05:43

## 2025-01-20 RX ADMIN — BUDESONIDE 500 MCG: 0.5 INHALANT RESPIRATORY (INHALATION) at 19:36

## 2025-01-20 RX ADMIN — DOCUSATE SODIUM 100 MG: 100 CAPSULE, LIQUID FILLED ORAL at 09:05

## 2025-01-20 RX ADMIN — CEFEPIME 1000 MG: 1 INJECTION, POWDER, FOR SOLUTION INTRAMUSCULAR; INTRAVENOUS at 23:52

## 2025-01-20 RX ADMIN — METRONIDAZOLE 500 MG: 500 INJECTION, SOLUTION INTRAVENOUS at 11:50

## 2025-01-20 RX ADMIN — IPRATROPIUM BROMIDE AND ALBUTEROL SULFATE 1 DOSE: 2.5; .5 SOLUTION RESPIRATORY (INHALATION) at 07:17

## 2025-01-20 RX ADMIN — PROPOFOL 20 MCG/KG/MIN: 10 INJECTION, EMULSION INTRAVENOUS at 21:54

## 2025-01-20 RX ADMIN — ESCITALOPRAM OXALATE 10 MG: 10 TABLET ORAL at 09:05

## 2025-01-20 RX ADMIN — NOREPINEPHRINE BITARTRATE 8 MCG/MIN: 0.06 INJECTION, SOLUTION INTRAVENOUS at 20:21

## 2025-01-20 RX ADMIN — MICONAZOLE NITRATE: 2 OINTMENT TOPICAL at 09:06

## 2025-01-20 RX ADMIN — WATER 40 MG: 1 INJECTION INTRAMUSCULAR; INTRAVENOUS; SUBCUTANEOUS at 21:42

## 2025-01-20 RX ADMIN — IPRATROPIUM BROMIDE AND ALBUTEROL SULFATE 1 DOSE: 2.5; .5 SOLUTION RESPIRATORY (INHALATION) at 15:27

## 2025-01-20 RX ADMIN — IPRATROPIUM BROMIDE AND ALBUTEROL SULFATE 1 DOSE: 2.5; .5 SOLUTION RESPIRATORY (INHALATION) at 11:50

## 2025-01-20 ASSESSMENT — PULMONARY FUNCTION TESTS
PIF_VALUE: 28
PIF_VALUE: 30
PIF_VALUE: 25
PIF_VALUE: 32
PIF_VALUE: 25
PIF_VALUE: 28

## 2025-01-20 NOTE — CARE COORDINATION
Patient is having MRI tomorrow. Possible trial after.   If not able to trial tomorrow then Wednesday.

## 2025-01-21 ENCOUNTER — APPOINTMENT (OUTPATIENT)
Dept: MRI IMAGING | Age: 85
DRG: 444 | End: 2025-01-21
Payer: MEDICARE

## 2025-01-21 ENCOUNTER — APPOINTMENT (OUTPATIENT)
Age: 85
DRG: 444 | End: 2025-01-21
Attending: STUDENT IN AN ORGANIZED HEALTH CARE EDUCATION/TRAINING PROGRAM
Payer: MEDICARE

## 2025-01-21 ENCOUNTER — APPOINTMENT (OUTPATIENT)
Dept: GENERAL RADIOLOGY | Age: 85
DRG: 444 | End: 2025-01-21
Payer: MEDICARE

## 2025-01-21 PROBLEM — I63.433 CEREBROVASCULAR ACCIDENT (CVA) DUE TO BILATERAL EMBOLISM OF POSTERIOR CEREBRAL ARTERIES (HCC): Status: ACTIVE | Noted: 2025-01-21

## 2025-01-21 LAB
ALBUMIN SERPL-MCNC: 2.1 G/DL (ref 3.4–5)
ANION GAP SERPL CALCULATED.3IONS-SCNC: 11 MMOL/L (ref 3–16)
ANION GAP SERPL CALCULATED.3IONS-SCNC: 12 MMOL/L (ref 3–16)
ANION GAP SERPL CALCULATED.3IONS-SCNC: 12 MMOL/L (ref 3–16)
ANTI-XA UNFRAC HEPARIN: <0.1 IU/ML (ref 0.3–0.7)
BASE EXCESS BLDA CALC-SCNC: 0.6 MMOL/L (ref -3–3)
BASOPHILS # BLD: 0 K/UL (ref 0–0.2)
BASOPHILS NFR BLD: 0.1 %
BUN SERPL-MCNC: 58 MG/DL (ref 7–20)
BUN SERPL-MCNC: 59 MG/DL (ref 7–20)
BUN SERPL-MCNC: 60 MG/DL (ref 7–20)
CALCIUM SERPL-MCNC: 7.3 MG/DL (ref 8.3–10.6)
CALCIUM SERPL-MCNC: 7.4 MG/DL (ref 8.3–10.6)
CALCIUM SERPL-MCNC: 7.5 MG/DL (ref 8.3–10.6)
CHLORIDE SERPL-SCNC: 100 MMOL/L (ref 99–110)
CHLORIDE SERPL-SCNC: 103 MMOL/L (ref 99–110)
CHLORIDE SERPL-SCNC: 97 MMOL/L (ref 99–110)
CO2 BLDA-SCNC: 25.6 MMOL/L
CO2 SERPL-SCNC: 24 MMOL/L (ref 21–32)
CO2 SERPL-SCNC: 25 MMOL/L (ref 21–32)
CO2 SERPL-SCNC: 25 MMOL/L (ref 21–32)
COHGB MFR BLDA: 0.3 % (ref 0–1.5)
CREAT SERPL-MCNC: 2.6 MG/DL (ref 0.6–1.2)
CREAT SERPL-MCNC: 2.9 MG/DL (ref 0.6–1.2)
CREAT SERPL-MCNC: 3 MG/DL (ref 0.6–1.2)
DEPRECATED RDW RBC AUTO: 15.9 % (ref 12.4–15.4)
ECHO AO ROOT DIAM: 2.5 CM
ECHO AO ROOT INDEX: 1.71 CM/M2
ECHO BSA: 1.43 M2
ECHO BSA: 1.46 M2
ECHO LA DIAMETER INDEX: 1.58 CM/M2
ECHO LA DIAMETER: 2.3 CM
ECHO LA TO AORTIC ROOT RATIO: 0.92
ECHO LV EDV A2C: 46 ML
ECHO LV EDV A4C: 38 ML
ECHO LV EDV INDEX A4C: 26 ML/M2
ECHO LV EDV NDEX A2C: 32 ML/M2
ECHO LV EF PHYSICIAN: 58 %
ECHO LV EJECTION FRACTION A2C: 62 %
ECHO LV EJECTION FRACTION A4C: 61 %
ECHO LV EJECTION FRACTION BIPLANE: 61 % (ref 55–100)
ECHO LV ESV A2C: 18 ML
ECHO LV ESV A4C: 15 ML
ECHO LV ESV INDEX A2C: 12 ML/M2
ECHO LV ESV INDEX A4C: 10 ML/M2
ECHO LV FRACTIONAL SHORTENING: 29 % (ref 28–44)
ECHO LV INTERNAL DIMENSION DIASTOLE INDEX: 2.81 CM/M2
ECHO LV INTERNAL DIMENSION DIASTOLIC: 4.1 CM (ref 3.9–5.3)
ECHO LV INTERNAL DIMENSION SYSTOLIC INDEX: 1.99 CM/M2
ECHO LV INTERNAL DIMENSION SYSTOLIC: 2.9 CM
ECHO LV IVSD: 0.6 CM (ref 0.6–0.9)
ECHO LV MASS 2D: 67.1 G (ref 67–162)
ECHO LV MASS INDEX 2D: 46 G/M2 (ref 43–95)
ECHO LV POSTERIOR WALL DIASTOLIC: 0.6 CM (ref 0.6–0.9)
ECHO LV RELATIVE WALL THICKNESS RATIO: 0.29
EOSINOPHIL # BLD: 0 K/UL (ref 0–0.6)
EOSINOPHIL NFR BLD: 0 %
GFR SERPLBLD CREATININE-BSD FMLA CKD-EPI: 15 ML/MIN/{1.73_M2}
GFR SERPLBLD CREATININE-BSD FMLA CKD-EPI: 16 ML/MIN/{1.73_M2}
GFR SERPLBLD CREATININE-BSD FMLA CKD-EPI: 18 ML/MIN/{1.73_M2}
GLUCOSE BLD-MCNC: 125 MG/DL (ref 70–99)
GLUCOSE BLD-MCNC: 151 MG/DL (ref 70–99)
GLUCOSE BLD-MCNC: 151 MG/DL (ref 70–99)
GLUCOSE BLD-MCNC: 156 MG/DL (ref 70–99)
GLUCOSE SERPL-MCNC: 150 MG/DL (ref 70–99)
GLUCOSE SERPL-MCNC: 171 MG/DL (ref 70–99)
GLUCOSE SERPL-MCNC: 172 MG/DL (ref 70–99)
HCO3 BLDA-SCNC: 24.5 MMOL/L (ref 21–29)
HCT VFR BLD AUTO: 23.9 % (ref 36–48)
HCT VFR BLD AUTO: 24.7 % (ref 36–48)
HCT VFR BLD AUTO: 26 % (ref 36–48)
HGB BLD-MCNC: 7.9 G/DL (ref 12–16)
HGB BLD-MCNC: 8.2 G/DL (ref 12–16)
HGB BLD-MCNC: 8.7 G/DL (ref 12–16)
HGB BLDA-MCNC: 9.2 G/DL (ref 12–16)
LACTATE BLDV-SCNC: 1 MMOL/L (ref 0.4–2)
LACTATE BLDV-SCNC: 1.3 MMOL/L (ref 0.4–2)
LYMPHOCYTES # BLD: 0.5 K/UL (ref 1–5.1)
LYMPHOCYTES NFR BLD: 1.6 %
MCH RBC QN AUTO: 30.6 PG (ref 26–34)
MCHC RBC AUTO-ENTMCNC: 33.3 G/DL (ref 31–36)
MCV RBC AUTO: 91.6 FL (ref 80–100)
METHGB MFR BLDA: 0.3 %
MONOCYTES # BLD: 0.5 K/UL (ref 0–1.3)
MONOCYTES NFR BLD: 1.6 %
NEUTROPHILS # BLD: 31.4 K/UL (ref 1.7–7.7)
NEUTROPHILS NFR BLD: 96.7 %
O2 THERAPY: ABNORMAL
PCO2 BLDA: 36.6 MMHG (ref 35–45)
PERFORMED ON: ABNORMAL
PH BLDA: 7.44 [PH] (ref 7.35–7.45)
PHOSPHATE SERPL-MCNC: 4 MG/DL (ref 2.5–4.9)
PLATELET # BLD AUTO: 166 K/UL (ref 135–450)
PMV BLD AUTO: 10.1 FL (ref 5–10.5)
PO2 BLDA: 95.8 MMHG (ref 75–108)
POTASSIUM SERPL-SCNC: 4.6 MMOL/L (ref 3.5–5.1)
POTASSIUM SERPL-SCNC: 4.9 MMOL/L (ref 3.5–5.1)
RBC # BLD AUTO: 2.83 M/UL (ref 4–5.2)
SAO2 % BLDA: 97.6 %
SODIUM SERPL-SCNC: 133 MMOL/L (ref 136–145)
SODIUM SERPL-SCNC: 136 MMOL/L (ref 136–145)
SODIUM SERPL-SCNC: 140 MMOL/L (ref 136–145)
TRIGL SERPL-MCNC: 209 MG/DL (ref 0–150)
VANCOMYCIN TROUGH SERPL-MCNC: 21.9 UG/ML (ref 10–20)
VAS LEFT CCA DIST EDV: 13.6 CM/S
VAS LEFT CCA DIST PSV: 39.1 CM/S
VAS LEFT CCA MID EDV: 14.9 CM/S
VAS LEFT CCA MID PSV: 40 CM/S
VAS LEFT CCA PROX EDV: 18.9 CM/S
VAS LEFT CCA PROX PSV: 55.3 CM/S
VAS LEFT ECA PSV: 86.5 CM/S
VAS LEFT ICA DIST EDV: 35.1 CM/S
VAS LEFT ICA DIST PSV: 90.4 CM/S
VAS LEFT ICA MID EDV: 23.3 CM/S
VAS LEFT ICA MID PSV: 57.5 CM/S
VAS LEFT ICA PROX EDV: 39.5 CM/S
VAS LEFT ICA PROX PSV: 87.4 CM/S
VAS LEFT ICA/CCA PSV: 2.26
VAS LEFT SUBCLAVIAN PROX PSV: 159 CM/S
VAS LEFT VERTEBRAL EDV: 12.7 CM/S
VAS LEFT VERTEBRAL PSV: 37.3 CM/S
VAS RIGHT CCA DIST EDV: 16.2 CM/S
VAS RIGHT CCA DIST PSV: 74.6 CM/S
VAS RIGHT CCA MID EDV: 18 CM/S
VAS RIGHT CCA MID PSV: 60.3 CM/S
VAS RIGHT ECA PSV: 74.6 CM/S
VAS RIGHT ICA MID EDV: 21.1 CM/S
VAS RIGHT ICA MID PSV: 70.2 CM/S
VAS RIGHT ICA PROX EDV: 16.2 CM/S
VAS RIGHT ICA PROX PSV: 101 CM/S
VAS RIGHT ICA/CCA PSV: 1.67
VAS RIGHT SUBCLAVIAN PROX PSV: 82 CM/S
VAS RIGHT VERTEBRAL EDV: 4.22 CM/S
VAS RIGHT VERTEBRAL PSV: 29 CM/S
WBC # BLD AUTO: 32.5 K/UL (ref 4–11)

## 2025-01-21 PROCEDURE — 85520 HEPARIN ASSAY: CPT

## 2025-01-21 PROCEDURE — 2500000003 HC RX 250 WO HCPCS: Performed by: INTERNAL MEDICINE

## 2025-01-21 PROCEDURE — 70544 MR ANGIOGRAPHY HEAD W/O DYE: CPT

## 2025-01-21 PROCEDURE — 6360000002 HC RX W HCPCS: Performed by: INTERNAL MEDICINE

## 2025-01-21 PROCEDURE — 2580000003 HC RX 258: Performed by: INTERNAL MEDICINE

## 2025-01-21 PROCEDURE — 85014 HEMATOCRIT: CPT

## 2025-01-21 PROCEDURE — 93970 EXTREMITY STUDY: CPT

## 2025-01-21 PROCEDURE — 71045 X-RAY EXAM CHEST 1 VIEW: CPT

## 2025-01-21 PROCEDURE — 70547 MR ANGIOGRAPHY NECK W/O DYE: CPT

## 2025-01-21 PROCEDURE — 82803 BLOOD GASES ANY COMBINATION: CPT

## 2025-01-21 PROCEDURE — 6370000000 HC RX 637 (ALT 250 FOR IP): Performed by: INTERNAL MEDICINE

## 2025-01-21 PROCEDURE — 80069 RENAL FUNCTION PANEL: CPT

## 2025-01-21 PROCEDURE — 99233 SBSQ HOSP IP/OBS HIGH 50: CPT | Performed by: INTERNAL MEDICINE

## 2025-01-21 PROCEDURE — 93880 EXTRACRANIAL BILAT STUDY: CPT | Performed by: INTERNAL MEDICINE

## 2025-01-21 PROCEDURE — 2700000000 HC OXYGEN THERAPY PER DAY

## 2025-01-21 PROCEDURE — 85018 HEMOGLOBIN: CPT

## 2025-01-21 PROCEDURE — 84478 ASSAY OF TRIGLYCERIDES: CPT

## 2025-01-21 PROCEDURE — 94761 N-INVAS EAR/PLS OXIMETRY MLT: CPT

## 2025-01-21 PROCEDURE — 99233 SBSQ HOSP IP/OBS HIGH 50: CPT | Performed by: STUDENT IN AN ORGANIZED HEALTH CARE EDUCATION/TRAINING PROGRAM

## 2025-01-21 PROCEDURE — 80202 ASSAY OF VANCOMYCIN: CPT

## 2025-01-21 PROCEDURE — 83605 ASSAY OF LACTIC ACID: CPT

## 2025-01-21 PROCEDURE — 93308 TTE F-UP OR LMTD: CPT

## 2025-01-21 PROCEDURE — 2000000000 HC ICU R&B

## 2025-01-21 PROCEDURE — 94003 VENT MGMT INPAT SUBQ DAY: CPT

## 2025-01-21 PROCEDURE — 70551 MRI BRAIN STEM W/O DYE: CPT

## 2025-01-21 PROCEDURE — 85025 COMPLETE CBC W/AUTO DIFF WBC: CPT

## 2025-01-21 PROCEDURE — 94640 AIRWAY INHALATION TREATMENT: CPT

## 2025-01-21 PROCEDURE — 93325 DOPPLER ECHO COLOR FLOW MAPG: CPT | Performed by: INTERNAL MEDICINE

## 2025-01-21 PROCEDURE — 93308 TTE F-UP OR LMTD: CPT | Performed by: INTERNAL MEDICINE

## 2025-01-21 PROCEDURE — 36592 COLLECT BLOOD FROM PICC: CPT

## 2025-01-21 RX ADMIN — SODIUM CHLORIDE, PRESERVATIVE FREE 10 ML: 5 INJECTION INTRAVENOUS at 09:01

## 2025-01-21 RX ADMIN — MICONAZOLE NITRATE: 2 OINTMENT TOPICAL at 09:10

## 2025-01-21 RX ADMIN — ATORVASTATIN CALCIUM 20 MG: 10 TABLET, FILM COATED ORAL at 20:15

## 2025-01-21 RX ADMIN — Medication 40 MG: at 20:14

## 2025-01-21 RX ADMIN — BUSPIRONE HYDROCHLORIDE 7.5 MG: 7.5 TABLET ORAL at 20:15

## 2025-01-21 RX ADMIN — METRONIDAZOLE 500 MG: 500 INJECTION, SOLUTION INTRAVENOUS at 10:26

## 2025-01-21 RX ADMIN — MIDAZOLAM HYDROCHLORIDE 2 MG: 1 INJECTION, SOLUTION INTRAMUSCULAR; INTRAVENOUS at 10:23

## 2025-01-21 RX ADMIN — BUDESONIDE 500 MCG: 0.5 INHALANT RESPIRATORY (INHALATION) at 19:57

## 2025-01-21 RX ADMIN — SODIUM CHLORIDE, PRESERVATIVE FREE 10 ML: 5 INJECTION INTRAVENOUS at 20:16

## 2025-01-21 RX ADMIN — Medication 40 MG: at 09:01

## 2025-01-21 RX ADMIN — MIDAZOLAM HYDROCHLORIDE 2 MG: 1 INJECTION, SOLUTION INTRAMUSCULAR; INTRAVENOUS at 12:24

## 2025-01-21 RX ADMIN — MICONAZOLE NITRATE: 2 OINTMENT TOPICAL at 20:16

## 2025-01-21 RX ADMIN — IPRATROPIUM BROMIDE AND ALBUTEROL SULFATE 1 DOSE: 2.5; .5 SOLUTION RESPIRATORY (INHALATION) at 11:29

## 2025-01-21 RX ADMIN — METRONIDAZOLE 500 MG: 500 INJECTION, SOLUTION INTRAVENOUS at 18:18

## 2025-01-21 RX ADMIN — PROPOFOL 30 MCG/KG/MIN: 10 INJECTION, EMULSION INTRAVENOUS at 18:24

## 2025-01-21 RX ADMIN — SODIUM CHLORIDE: 9 INJECTION, SOLUTION INTRAVENOUS at 14:44

## 2025-01-21 RX ADMIN — Medication 400 MG: at 09:01

## 2025-01-21 RX ADMIN — IPRATROPIUM BROMIDE AND ALBUTEROL SULFATE 1 DOSE: 2.5; .5 SOLUTION RESPIRATORY (INHALATION) at 07:09

## 2025-01-21 RX ADMIN — ESCITALOPRAM OXALATE 10 MG: 10 TABLET ORAL at 09:00

## 2025-01-21 RX ADMIN — BUSPIRONE HYDROCHLORIDE 7.5 MG: 7.5 TABLET ORAL at 09:00

## 2025-01-21 RX ADMIN — IPRATROPIUM BROMIDE AND ALBUTEROL SULFATE 1 DOSE: 2.5; .5 SOLUTION RESPIRATORY (INHALATION) at 19:57

## 2025-01-21 RX ADMIN — CEFEPIME 1000 MG: 1 INJECTION, POWDER, FOR SOLUTION INTRAMUSCULAR; INTRAVENOUS at 14:43

## 2025-01-21 RX ADMIN — METRONIDAZOLE 500 MG: 500 INJECTION, SOLUTION INTRAVENOUS at 03:24

## 2025-01-21 RX ADMIN — IPRATROPIUM BROMIDE AND ALBUTEROL SULFATE 1 DOSE: 2.5; .5 SOLUTION RESPIRATORY (INHALATION) at 15:08

## 2025-01-21 RX ADMIN — WATER 40 MG: 1 INJECTION INTRAMUSCULAR; INTRAVENOUS; SUBCUTANEOUS at 09:01

## 2025-01-21 RX ADMIN — PROPOFOL 30 MCG/KG/MIN: 10 INJECTION, EMULSION INTRAVENOUS at 11:37

## 2025-01-21 RX ADMIN — LEVOTHYROXINE SODIUM 25 MCG: 25 TABLET ORAL at 06:05

## 2025-01-21 RX ADMIN — BUDESONIDE 500 MCG: 0.5 INHALANT RESPIRATORY (INHALATION) at 07:09

## 2025-01-21 ASSESSMENT — PULMONARY FUNCTION TESTS
PIF_VALUE: 27
PIF_VALUE: 28
PIF_VALUE: 11
PIF_VALUE: 31
PIF_VALUE: 24
PIF_VALUE: 29
PIF_VALUE: 34

## 2025-01-21 NOTE — CARE COORDINATION
Reviewed chart, met with pt bedside, remains sedated on vent. Plan for MRI today and EGD. Plan TBD, pt has been accepted at [facility], will need PEG if unable to eat by mouth. [facility] also following. CM following.

## 2025-01-22 ENCOUNTER — APPOINTMENT (OUTPATIENT)
Dept: GENERAL RADIOLOGY | Age: 85
DRG: 444 | End: 2025-01-22
Payer: MEDICARE

## 2025-01-22 LAB
ALBUMIN SERPL-MCNC: 2.1 G/DL (ref 3.4–5)
ANION GAP SERPL CALCULATED.3IONS-SCNC: 11 MMOL/L (ref 3–16)
ANION GAP SERPL CALCULATED.3IONS-SCNC: 11 MMOL/L (ref 3–16)
ANION GAP SERPL CALCULATED.3IONS-SCNC: 9 MMOL/L (ref 3–16)
BASE EXCESS BLDA CALC-SCNC: -0.3 MMOL/L (ref -3–3)
BASOPHILS # BLD: 0 K/UL (ref 0–0.2)
BASOPHILS NFR BLD: 0.2 %
BLOOD BANK DISPENSE STATUS: NORMAL
BLOOD BANK PRODUCT CODE: NORMAL
BPU ID: NORMAL
BUN SERPL-MCNC: 58 MG/DL (ref 7–20)
BUN SERPL-MCNC: 60 MG/DL (ref 7–20)
BUN SERPL-MCNC: 60 MG/DL (ref 7–20)
CALCIUM SERPL-MCNC: 7 MG/DL (ref 8.3–10.6)
CALCIUM SERPL-MCNC: 7.3 MG/DL (ref 8.3–10.6)
CALCIUM SERPL-MCNC: 7.3 MG/DL (ref 8.3–10.6)
CHLORIDE SERPL-SCNC: 100 MMOL/L (ref 99–110)
CHLORIDE SERPL-SCNC: 109 MMOL/L (ref 99–110)
CHLORIDE SERPL-SCNC: 99 MMOL/L (ref 99–110)
CO2 BLDA-SCNC: 25.3 MMOL/L
CO2 SERPL-SCNC: 23 MMOL/L (ref 21–32)
COHGB MFR BLDA: 0.7 % (ref 0–1.5)
CREAT SERPL-MCNC: 2.8 MG/DL (ref 0.6–1.2)
CREAT SERPL-MCNC: 2.9 MG/DL (ref 0.6–1.2)
CREAT SERPL-MCNC: 3 MG/DL (ref 0.6–1.2)
DEPRECATED RDW RBC AUTO: 15.9 % (ref 12.4–15.4)
DESCRIPTION BLOOD BANK: NORMAL
ECHO BSA: 1.43 M2
EOSINOPHIL # BLD: 0 K/UL (ref 0–0.6)
EOSINOPHIL NFR BLD: 0 %
GFR SERPLBLD CREATININE-BSD FMLA CKD-EPI: 15 ML/MIN/{1.73_M2}
GFR SERPLBLD CREATININE-BSD FMLA CKD-EPI: 16 ML/MIN/{1.73_M2}
GFR SERPLBLD CREATININE-BSD FMLA CKD-EPI: 16 ML/MIN/{1.73_M2}
GLUCOSE BLD-MCNC: 100 MG/DL (ref 70–99)
GLUCOSE BLD-MCNC: 102 MG/DL (ref 70–99)
GLUCOSE BLD-MCNC: 90 MG/DL (ref 70–99)
GLUCOSE SERPL-MCNC: 100 MG/DL (ref 70–99)
GLUCOSE SERPL-MCNC: 103 MG/DL (ref 70–99)
GLUCOSE SERPL-MCNC: 104 MG/DL (ref 70–99)
HCO3 BLDA-SCNC: 24.2 MMOL/L (ref 21–29)
HCT VFR BLD AUTO: 20.1 % (ref 36–48)
HCT VFR BLD AUTO: 22.5 % (ref 36–48)
HCT VFR BLD AUTO: 23.3 % (ref 36–48)
HCT VFR BLD AUTO: 25.3 % (ref 36–48)
HGB BLD-MCNC: 6.6 G/DL (ref 12–16)
HGB BLD-MCNC: 7.3 G/DL (ref 12–16)
HGB BLD-MCNC: 7.7 G/DL (ref 12–16)
HGB BLD-MCNC: 8.3 G/DL (ref 12–16)
HGB BLDA-MCNC: 5.6 G/DL (ref 12–16)
LYMPHOCYTES # BLD: 0.4 K/UL (ref 1–5.1)
LYMPHOCYTES NFR BLD: 2 %
MAGNESIUM SERPL-MCNC: 1.73 MG/DL (ref 1.8–2.4)
MCH RBC QN AUTO: 30.6 PG (ref 26–34)
MCHC RBC AUTO-ENTMCNC: 32.7 G/DL (ref 31–36)
MCV RBC AUTO: 93.5 FL (ref 80–100)
METHGB MFR BLDA: 0.5 %
MONOCYTES # BLD: 0.6 K/UL (ref 0–1.3)
MONOCYTES NFR BLD: 2.6 %
NEUTROPHILS # BLD: 20.6 K/UL (ref 1.7–7.7)
NEUTROPHILS NFR BLD: 95.2 %
O2 THERAPY: ABNORMAL
PCO2 BLDA: 38.1 MMHG (ref 35–45)
PERFORMED ON: ABNORMAL
PERFORMED ON: ABNORMAL
PERFORMED ON: NORMAL
PH BLDA: 7.42 [PH] (ref 7.35–7.45)
PHOSPHATE SERPL-MCNC: 4.2 MG/DL (ref 2.5–4.9)
PLATELET # BLD AUTO: 126 K/UL (ref 135–450)
PMV BLD AUTO: 9.4 FL (ref 5–10.5)
PO2 BLDA: 86.6 MMHG (ref 75–108)
POTASSIUM SERPL-SCNC: 4.4 MMOL/L (ref 3.5–5.1)
POTASSIUM SERPL-SCNC: 4.4 MMOL/L (ref 3.5–5.1)
POTASSIUM SERPL-SCNC: 4.5 MMOL/L (ref 3.5–5.1)
POTASSIUM SERPL-SCNC: 4.5 MMOL/L (ref 3.5–5.1)
RBC # BLD AUTO: 2.4 M/UL (ref 4–5.2)
SAO2 % BLDA: 96.8 %
SODIUM SERPL-SCNC: 132 MMOL/L (ref 136–145)
SODIUM SERPL-SCNC: 133 MMOL/L (ref 136–145)
SODIUM SERPL-SCNC: 143 MMOL/L (ref 136–145)
WBC # BLD AUTO: 21.7 K/UL (ref 4–11)

## 2025-01-22 PROCEDURE — 2709999900 HC NON-CHARGEABLE SUPPLY: Performed by: INTERNAL MEDICINE

## 2025-01-22 PROCEDURE — 3609013300 HC EGD TUBE PLACEMENT: Performed by: INTERNAL MEDICINE

## 2025-01-22 PROCEDURE — 2580000003 HC RX 258: Performed by: INTERNAL MEDICINE

## 2025-01-22 PROCEDURE — 99152 MOD SED SAME PHYS/QHP 5/>YRS: CPT | Performed by: INTERNAL MEDICINE

## 2025-01-22 PROCEDURE — 2500000003 HC RX 250 WO HCPCS: Performed by: INTERNAL MEDICINE

## 2025-01-22 PROCEDURE — 6370000000 HC RX 637 (ALT 250 FOR IP): Performed by: INTERNAL MEDICINE

## 2025-01-22 PROCEDURE — 36430 TRANSFUSION BLD/BLD COMPNT: CPT

## 2025-01-22 PROCEDURE — 6360000002 HC RX W HCPCS: Performed by: INTERNAL MEDICINE

## 2025-01-22 PROCEDURE — 2000000000 HC ICU R&B

## 2025-01-22 PROCEDURE — 85018 HEMOGLOBIN: CPT

## 2025-01-22 PROCEDURE — 94640 AIRWAY INHALATION TREATMENT: CPT

## 2025-01-22 PROCEDURE — 80069 RENAL FUNCTION PANEL: CPT

## 2025-01-22 PROCEDURE — 83735 ASSAY OF MAGNESIUM: CPT

## 2025-01-22 PROCEDURE — 2700000000 HC OXYGEN THERAPY PER DAY

## 2025-01-22 PROCEDURE — 94761 N-INVAS EAR/PLS OXIMETRY MLT: CPT

## 2025-01-22 PROCEDURE — 85025 COMPLETE CBC W/AUTO DIFF WBC: CPT

## 2025-01-22 PROCEDURE — 85014 HEMATOCRIT: CPT

## 2025-01-22 PROCEDURE — 94003 VENT MGMT INPAT SUBQ DAY: CPT

## 2025-01-22 PROCEDURE — 99232 SBSQ HOSP IP/OBS MODERATE 35: CPT | Performed by: STUDENT IN AN ORGANIZED HEALTH CARE EDUCATION/TRAINING PROGRAM

## 2025-01-22 PROCEDURE — 5A1945Z RESPIRATORY VENTILATION, 24-96 CONSECUTIVE HOURS: ICD-10-PCS | Performed by: INTERNAL MEDICINE

## 2025-01-22 PROCEDURE — 82803 BLOOD GASES ANY COMBINATION: CPT

## 2025-01-22 PROCEDURE — 71045 X-RAY EXAM CHEST 1 VIEW: CPT

## 2025-01-22 PROCEDURE — 99233 SBSQ HOSP IP/OBS HIGH 50: CPT | Performed by: INTERNAL MEDICINE

## 2025-01-22 PROCEDURE — 0DJ08ZZ INSPECTION OF UPPER INTESTINAL TRACT, VIA NATURAL OR ARTIFICIAL OPENING ENDOSCOPIC: ICD-10-PCS | Performed by: INTERNAL MEDICINE

## 2025-01-22 PROCEDURE — 93970 EXTREMITY STUDY: CPT | Performed by: INTERNAL MEDICINE

## 2025-01-22 RX ORDER — MIDAZOLAM HYDROCHLORIDE 5 MG/ML
INJECTION, SOLUTION INTRAMUSCULAR; INTRAVENOUS PRN
Status: DISCONTINUED | OUTPATIENT
Start: 2025-01-22 | End: 2025-01-22 | Stop reason: ALTCHOICE

## 2025-01-22 RX ORDER — SODIUM CHLORIDE 9 MG/ML
INJECTION, SOLUTION INTRAVENOUS PRN
Status: DISCONTINUED | OUTPATIENT
Start: 2025-01-22 | End: 2025-01-22

## 2025-01-22 RX ORDER — FENTANYL CITRATE 50 UG/ML
INJECTION, SOLUTION INTRAMUSCULAR; INTRAVENOUS PRN
Status: DISCONTINUED | OUTPATIENT
Start: 2025-01-22 | End: 2025-01-22 | Stop reason: ALTCHOICE

## 2025-01-22 RX ORDER — SODIUM CHLORIDE 9 MG/ML
INJECTION, SOLUTION INTRAVENOUS PRN
Status: DISCONTINUED | OUTPATIENT
Start: 2025-01-22 | End: 2025-01-27 | Stop reason: HOSPADM

## 2025-01-22 RX ADMIN — PROPOFOL 30 MCG/KG/MIN: 10 INJECTION, EMULSION INTRAVENOUS at 22:55

## 2025-01-22 RX ADMIN — SODIUM CHLORIDE, PRESERVATIVE FREE 10 ML: 5 INJECTION INTRAVENOUS at 08:39

## 2025-01-22 RX ADMIN — IPRATROPIUM BROMIDE AND ALBUTEROL SULFATE 1 DOSE: 2.5; .5 SOLUTION RESPIRATORY (INHALATION) at 14:49

## 2025-01-22 RX ADMIN — CEFEPIME 1000 MG: 1 INJECTION, POWDER, FOR SOLUTION INTRAMUSCULAR; INTRAVENOUS at 12:19

## 2025-01-22 RX ADMIN — CEFEPIME 1000 MG: 1 INJECTION, POWDER, FOR SOLUTION INTRAMUSCULAR; INTRAVENOUS at 00:13

## 2025-01-22 RX ADMIN — SODIUM CHLORIDE: 9 INJECTION, SOLUTION INTRAVENOUS at 22:48

## 2025-01-22 RX ADMIN — CEFEPIME 1000 MG: 1 INJECTION, POWDER, FOR SOLUTION INTRAMUSCULAR; INTRAVENOUS at 23:19

## 2025-01-22 RX ADMIN — Medication 40 MG: at 08:39

## 2025-01-22 RX ADMIN — SODIUM CHLORIDE, PRESERVATIVE FREE 10 ML: 5 INJECTION INTRAVENOUS at 20:36

## 2025-01-22 RX ADMIN — Medication 40 MG: at 20:35

## 2025-01-22 RX ADMIN — IPRATROPIUM BROMIDE AND ALBUTEROL SULFATE 1 DOSE: 2.5; .5 SOLUTION RESPIRATORY (INHALATION) at 08:19

## 2025-01-22 RX ADMIN — PROPOFOL 30 MCG/KG/MIN: 10 INJECTION, EMULSION INTRAVENOUS at 05:34

## 2025-01-22 RX ADMIN — Medication 400 MG: at 08:39

## 2025-01-22 RX ADMIN — IPRATROPIUM BROMIDE AND ALBUTEROL SULFATE 1 DOSE: 2.5; .5 SOLUTION RESPIRATORY (INHALATION) at 10:50

## 2025-01-22 RX ADMIN — ESCITALOPRAM OXALATE 10 MG: 10 TABLET ORAL at 08:39

## 2025-01-22 RX ADMIN — MICONAZOLE NITRATE: 2 OINTMENT TOPICAL at 08:40

## 2025-01-22 RX ADMIN — MICONAZOLE NITRATE: 2 OINTMENT TOPICAL at 20:36

## 2025-01-22 RX ADMIN — LEVOTHYROXINE SODIUM 25 MCG: 25 TABLET ORAL at 05:28

## 2025-01-22 RX ADMIN — BUDESONIDE 500 MCG: 0.5 INHALANT RESPIRATORY (INHALATION) at 08:19

## 2025-01-22 RX ADMIN — METRONIDAZOLE 500 MG: 500 INJECTION, SOLUTION INTRAVENOUS at 18:35

## 2025-01-22 RX ADMIN — BUSPIRONE HYDROCHLORIDE 7.5 MG: 7.5 TABLET ORAL at 20:35

## 2025-01-22 RX ADMIN — IPRATROPIUM BROMIDE AND ALBUTEROL SULFATE 1 DOSE: 2.5; .5 SOLUTION RESPIRATORY (INHALATION) at 19:41

## 2025-01-22 RX ADMIN — ATORVASTATIN CALCIUM 20 MG: 10 TABLET, FILM COATED ORAL at 20:35

## 2025-01-22 RX ADMIN — METRONIDAZOLE 500 MG: 500 INJECTION, SOLUTION INTRAVENOUS at 03:46

## 2025-01-22 RX ADMIN — METRONIDAZOLE 500 MG: 500 INJECTION, SOLUTION INTRAVENOUS at 11:01

## 2025-01-22 RX ADMIN — SODIUM CHLORIDE: 9 INJECTION, SOLUTION INTRAVENOUS at 22:58

## 2025-01-22 RX ADMIN — BUDESONIDE 500 MCG: 0.5 INHALANT RESPIRATORY (INHALATION) at 19:41

## 2025-01-22 RX ADMIN — NOREPINEPHRINE BITARTRATE 3 MCG/MIN: 0.06 INJECTION, SOLUTION INTRAVENOUS at 22:57

## 2025-01-22 RX ADMIN — PREDNISONE 30 MG: 20 TABLET ORAL at 08:39

## 2025-01-22 RX ADMIN — BUSPIRONE HYDROCHLORIDE 7.5 MG: 7.5 TABLET ORAL at 08:39

## 2025-01-22 RX ADMIN — PROPOFOL 30 MCG/KG/MIN: 10 INJECTION, EMULSION INTRAVENOUS at 16:28

## 2025-01-22 ASSESSMENT — PULMONARY FUNCTION TESTS
PIF_VALUE: 31
PIF_VALUE: 31
PIF_VALUE: 32
PIF_VALUE: 31
PIF_VALUE: 25
PIF_VALUE: 34

## 2025-01-22 ASSESSMENT — PAIN - FUNCTIONAL ASSESSMENT: PAIN_FUNCTIONAL_ASSESSMENT: CRITICAL CARE PAIN OBSERVATION TOOL (CPOT)

## 2025-01-22 NOTE — FLOWSHEET NOTE
01/22/25 1430   Vitals   /63   Temp (!) 95.7 °F (35.4 °C)   Temp Source Bladder   Pulse 64   Respirations 20   SpO2 97 %     Kim hugger applied and turned on high.     Prior to receiving transfusion of blood products, patient daughter educated on the following:    Blood product transfusion process  Benefits of receiving blood product transfusion  Risks associated with receiving the transfusion  Signs and symptoms of complications associated with blood product transfusion  Vague, uneasy feeling  Onset of pain (especially at the IV site, back, or chest)  Chills  Flushing  Fever  Nausea  Dizziness  Rash  Itching  Dark or red urine  Instructed patient daughter to notify RN immediately if any sign or symptom occurs        Blood initiated at 1437

## 2025-01-22 NOTE — CARE COORDINATION
Breathing trial started this morning. Patient alert and following commands.    [facility] transitions nurse met with patient/family at bedside this morning. The family wants the patient to remain a full code.     Patient has been accepted at the [facility] at discharge. Plan TBD

## 2025-01-22 NOTE — H&P
History and Physical / Pre-Sedation Assessment    Patient:  Terri Smith   :   1940     Intended Procedure:  EGD    HPI: 84-year-old female with history of HTN, HLD, COPD, (O2 dependence, A-fib, CHF, CAD status post CABG, remote GI bleed admitted with COPD exacerbation and choledocholithiasis s/p ERCP with stone extraction. Hospital course complicated by retroperitoneal hematoma and subacute CVA now in need of PEG tube for nutritional support     Past Medical History:   Diagnosis Date    NADJA (acute kidney injury) (HCC)     Asthma     Atrial fibrillation with RVR (HCC)     CAD (coronary artery disease)     CHF (congestive heart failure) (HCC)     unsure    Chronic anxiety     COPD (chronic obstructive pulmonary disease) (HCC)     COPD (chronic obstructive pulmonary disease) (HCC) 2023    GERD (gastroesophageal reflux disease) 2021    Heart attack (HCC)     History of blood transfusion     Hyperlipidemia     Hypertension     MRSA (methicillin resistant staph aureus) culture positive 2019    + resp cx    OA (osteoarthritis) 2014    Thyroid disease      Past Surgical History:   Procedure Laterality Date    BRONCHOSCOPY  2019    Dr. Wheatley - w/BAL    CARDIAC CATHETERIZATION  2019    Dr. Palomares     SECTION      COLONOSCOPY N/A 2020    COLONOSCOPY DIAGNOSTIC performed by Rowdy Schmitz DO at [facility] ENDOSCOPY    COLONOSCOPY N/A 10/22/2021    COLONOSCOPY POLYPECTOMY SNARE/COLD BIOPSY performed by Celestine Lester MD at [facility] ENDOSCOPY    COLONOSCOPY N/A 2023    COLONOSCOPY POLYPECTOMY SNARE performed by Darron Langford MD at [facility] ENDOSCOPY    COLONOSCOPY N/A 2024    COLONOSCOPY POLYPECTOMY SNARE/BIOPSY performed by Calvin Lopez MD at [facility] ENDOSCOPY    CORONARY ARTERY BYPASS GRAFT N/A 2019    OFF PUMP CORONARY ARTERY BYPASS GRAFTING X2, INTERNAL MAMMARY ARTERY, SAPHENOUS VEIN GRAFT performed by Yolanda Chase, 
capsule by mouth 3 times daily.   Yes Charmaine Gant MD   guaiFENesin (MUCINEX) 600 MG extended release tablet Take 2 tablets by mouth 2 times daily   Yes Charmaine Gant MD   magnesium oxide (MAG-OX) 400 (240 Mg) MG tablet Take 1 tablet by mouth daily   Yes Charmaine Gant MD   polyethylene glycol (GLYCOLAX) 17 g packet Take 1 packet by mouth nightly   Yes Charmaine Gant MD   furosemide (LASIX) 20 MG tablet Take 1 tablet by mouth daily 12/6/24  Yes Martina Kumar MD   metoprolol tartrate (LOPRESSOR) 25 MG tablet Take 0.5 tablets by mouth 2 times daily 12/6/24  Yes Martina Kumar MD   budesonide (PULMICORT) 0.5 MG/2ML nebulizer suspension Take 2 mLs by nebulization in the morning and 2 mLs in the evening. 11/25/24  Yes Mena Calzada MD   escitalopram (LEXAPRO) 10 MG tablet Take 1 tablet by mouth daily 11/26/24  Yes Mena Calzada MD   levothyroxine (SYNTHROID) 25 MCG tablet Take 1 tablet by mouth Daily for 120 doses 11/26/24 3/26/25 Yes Mena Calzada MD   pantoprazole (PROTONIX) 40 MG tablet Take 1 tablet by mouth in the morning and at bedtime 11/25/24  Yes Mena Calzada MD   ferrous sulfate (IRON 325) 325 (65 Fe) MG tablet Take 1 tablet by mouth daily (with breakfast)   Yes Charmaine Gant MD   docusate sodium (COLACE, DULCOLAX) 100 MG CAPS Take 100 mg by mouth daily 2/6/24  Yes Rneny Vogel MD   ELIQUIS 2.5 MG TABS tablet Take 1 tablet by mouth 2 times daily 10/2/23  Yes Charmaine Gant MD   Roflumilast (DALIRESP) 500 MCG tablet Take 1 tablet by mouth daily   Yes Charmaine Gant MD   bisacodyl (DULCOLAX) 10 MG suppository Place 1 suppository rectally daily as needed for Constipation (if no BM in 3 days)    Charmaine Gant MD   bisacodyl (DULCOLAX) 5 MG EC tablet Take 2 tablets by mouth daily as needed for Constipation    Provider, Historical, MD   guaiFENesin (ROBITUSSIN) 100 MG/5ML liquid Take 10 mLs by mouth every 6 hours as 
Take 1 tablet by mouth 2 times daily 10/2/23  Yes Charmaine Gant MD   Roflumilast (DALIRESP) 500 MCG tablet Take 1 tablet by mouth daily   Yes Charmaine Gant MD   bisacodyl (DULCOLAX) 10 MG suppository Place 1 suppository rectally daily as needed for Constipation (if no BM in 3 days)    Charmaine Gant MD   bisacodyl (DULCOLAX) 5 MG EC tablet Take 2 tablets by mouth daily as needed for Constipation    Charmaine Gant MD   guaiFENesin (ROBITUSSIN) 100 MG/5ML liquid Take 10 mLs by mouth every 6 hours as needed for Cough    Charmaine Gant MD   sodium chloride (OCEAN, BABY AYR) 0.65 % nasal spray 1 spray by Nasal route every 4 hours as needed for Congestion (nasal congestion) Both nostrils    Charmaine Gant MD   polyethylene glycol (GLYCOLAX) 17 g packet Take 1 packet by mouth daily as needed for Constipation 12/21/24 1/20/25  Александр Zuniga DO   meclizine (ANTIVERT) 12.5 MG tablet Take 1 tablet by mouth 3 times daily as needed for Dizziness 12/6/24 1/5/25  Martina Kumar MD   midodrine (PROAMATINE) 5 MG tablet Take 1 tablet by mouth 2 times daily as needed (SBP < 90 or MAP < 65) 12/6/24   Martina Kumar MD   fluticasone (FLONASE) 50 MCG/ACT nasal spray 1 spray by Each Nostril route daily as needed for Rhinitis 11/25/24   Mena Calzada MD   LORazepam (ATIVAN) 0.5 MG tablet Take 1 tablet by mouth every 6 hours as needed for Anxiety.    Charmaine Gant MD   phenol 1.4 % LIQD mouth spray Take 1 spray by mouth every 2 hours as needed for Sore Throat 9/23/24   Raphael Wray MD   ondansetron (ZOFRAN-ODT) 4 MG disintegrating tablet Take 1 tablet by mouth 3 times daily as needed for Nausea or Vomiting 7/21/24   Christiano Pedroza DO   atorvastatin (LIPITOR) 40 MG tablet Take 0.5 tablets by mouth at bedtime    Charmaine Gant MD   busPIRone (BUSPAR) 5 MG tablet Take 2 tablets by mouth every 6 hours as needed (anxiety)    Provider, MD Charmaine   albuterol

## 2025-01-23 ENCOUNTER — APPOINTMENT (OUTPATIENT)
Dept: GENERAL RADIOLOGY | Age: 85
DRG: 444 | End: 2025-01-23
Payer: MEDICARE

## 2025-01-23 LAB
ALBUMIN SERPL-MCNC: 2.2 G/DL (ref 3.4–5)
ANION GAP SERPL CALCULATED.3IONS-SCNC: 12 MMOL/L (ref 3–16)
BASE EXCESS BLDA CALC-SCNC: -5.3 MMOL/L (ref -3–3)
BASOPHILS # BLD: 0 K/UL (ref 0–0.2)
BASOPHILS NFR BLD: 0.2 %
BUN SERPL-MCNC: 53 MG/DL (ref 7–20)
CALCIUM SERPL-MCNC: 7.2 MG/DL (ref 8.3–10.6)
CHLORIDE SERPL-SCNC: 110 MMOL/L (ref 99–110)
CO2 BLDA-SCNC: 20.3 MMOL/L
CO2 SERPL-SCNC: 22 MMOL/L (ref 21–32)
COHGB MFR BLDA: 0.3 % (ref 0–1.5)
CREAT SERPL-MCNC: 2.4 MG/DL (ref 0.6–1.2)
DEPRECATED RDW RBC AUTO: 16.4 % (ref 12.4–15.4)
EOSINOPHIL # BLD: 0 K/UL (ref 0–0.6)
EOSINOPHIL NFR BLD: 0.1 %
GFR SERPLBLD CREATININE-BSD FMLA CKD-EPI: 19 ML/MIN/{1.73_M2}
GLUCOSE BLD-MCNC: 128 MG/DL (ref 70–99)
GLUCOSE BLD-MCNC: 139 MG/DL (ref 70–99)
GLUCOSE BLD-MCNC: 74 MG/DL (ref 70–99)
GLUCOSE BLD-MCNC: 80 MG/DL (ref 70–99)
GLUCOSE BLD-MCNC: 86 MG/DL (ref 70–99)
GLUCOSE BLD-MCNC: 93 MG/DL (ref 70–99)
GLUCOSE SERPL-MCNC: 74 MG/DL (ref 70–99)
HCO3 BLDA-SCNC: 19.3 MMOL/L (ref 21–29)
HCT VFR BLD AUTO: 26 % (ref 36–48)
HCT VFR BLD AUTO: 27.1 % (ref 36–48)
HCT VFR BLD AUTO: 29.9 % (ref 36–48)
HGB BLD-MCNC: 8.6 G/DL (ref 12–16)
HGB BLD-MCNC: 9 G/DL (ref 12–16)
HGB BLD-MCNC: 9.6 G/DL (ref 12–16)
HGB BLDA-MCNC: 9.6 G/DL (ref 12–16)
LYMPHOCYTES # BLD: 0.8 K/UL (ref 1–5.1)
LYMPHOCYTES NFR BLD: 4.8 %
MCH RBC QN AUTO: 31.9 PG (ref 26–34)
MCHC RBC AUTO-ENTMCNC: 33.1 G/DL (ref 31–36)
MCV RBC AUTO: 96.5 FL (ref 80–100)
METHGB MFR BLDA: 0.1 %
MONOCYTES # BLD: 0.7 K/UL (ref 0–1.3)
MONOCYTES NFR BLD: 4.3 %
NEUTROPHILS # BLD: 15 K/UL (ref 1.7–7.7)
NEUTROPHILS NFR BLD: 90.6 %
O2 THERAPY: ABNORMAL
PCO2 BLDA: 34.1 MMHG (ref 35–45)
PERFORMED ON: ABNORMAL
PERFORMED ON: ABNORMAL
PERFORMED ON: NORMAL
PH BLDA: 7.37 [PH] (ref 7.35–7.45)
PHOSPHATE SERPL-MCNC: 4.3 MG/DL (ref 2.5–4.9)
PLATELET # BLD AUTO: 101 K/UL (ref 135–450)
PMV BLD AUTO: 9.5 FL (ref 5–10.5)
PO2 BLDA: 97.5 MMHG (ref 75–108)
POTASSIUM SERPL-SCNC: 4.3 MMOL/L (ref 3.5–5.1)
RBC # BLD AUTO: 2.7 M/UL (ref 4–5.2)
SAO2 % BLDA: 97.3 %
SODIUM SERPL-SCNC: 144 MMOL/L (ref 136–145)
WBC # BLD AUTO: 16.5 K/UL (ref 4–11)

## 2025-01-23 PROCEDURE — 6370000000 HC RX 637 (ALT 250 FOR IP): Performed by: INTERNAL MEDICINE

## 2025-01-23 PROCEDURE — 2580000003 HC RX 258: Performed by: INTERNAL MEDICINE

## 2025-01-23 PROCEDURE — 85025 COMPLETE CBC W/AUTO DIFF WBC: CPT

## 2025-01-23 PROCEDURE — 94003 VENT MGMT INPAT SUBQ DAY: CPT

## 2025-01-23 PROCEDURE — 71045 X-RAY EXAM CHEST 1 VIEW: CPT

## 2025-01-23 PROCEDURE — 94761 N-INVAS EAR/PLS OXIMETRY MLT: CPT

## 2025-01-23 PROCEDURE — 85018 HEMOGLOBIN: CPT

## 2025-01-23 PROCEDURE — 6360000002 HC RX W HCPCS: Performed by: INTERNAL MEDICINE

## 2025-01-23 PROCEDURE — 2500000003 HC RX 250 WO HCPCS: Performed by: INTERNAL MEDICINE

## 2025-01-23 PROCEDURE — 2700000000 HC OXYGEN THERAPY PER DAY

## 2025-01-23 PROCEDURE — 82803 BLOOD GASES ANY COMBINATION: CPT

## 2025-01-23 PROCEDURE — 99233 SBSQ HOSP IP/OBS HIGH 50: CPT | Performed by: INTERNAL MEDICINE

## 2025-01-23 PROCEDURE — 94640 AIRWAY INHALATION TREATMENT: CPT

## 2025-01-23 PROCEDURE — 80069 RENAL FUNCTION PANEL: CPT

## 2025-01-23 PROCEDURE — 2000000000 HC ICU R&B

## 2025-01-23 PROCEDURE — 85014 HEMATOCRIT: CPT

## 2025-01-23 RX ORDER — DEXTROSE MONOHYDRATE AND SODIUM CHLORIDE 5; .45 G/100ML; G/100ML
INJECTION, SOLUTION INTRAVENOUS CONTINUOUS
Status: DISCONTINUED | OUTPATIENT
Start: 2025-01-23 | End: 2025-01-24

## 2025-01-23 RX ADMIN — LEVOTHYROXINE SODIUM 25 MCG: 25 TABLET ORAL at 06:07

## 2025-01-23 RX ADMIN — METRONIDAZOLE 500 MG: 500 INJECTION, SOLUTION INTRAVENOUS at 11:19

## 2025-01-23 RX ADMIN — DEXTROSE AND SODIUM CHLORIDE: 5; 450 INJECTION, SOLUTION INTRAVENOUS at 11:12

## 2025-01-23 RX ADMIN — BUDESONIDE 500 MCG: 0.5 INHALANT RESPIRATORY (INHALATION) at 08:14

## 2025-01-23 RX ADMIN — BUDESONIDE 500 MCG: 0.5 INHALANT RESPIRATORY (INHALATION) at 20:00

## 2025-01-23 RX ADMIN — BUSPIRONE HYDROCHLORIDE 7.5 MG: 7.5 TABLET ORAL at 21:50

## 2025-01-23 RX ADMIN — MICONAZOLE NITRATE: 2 OINTMENT TOPICAL at 22:39

## 2025-01-23 RX ADMIN — IPRATROPIUM BROMIDE AND ALBUTEROL SULFATE 1 DOSE: 2.5; .5 SOLUTION RESPIRATORY (INHALATION) at 11:40

## 2025-01-23 RX ADMIN — PREDNISONE 30 MG: 20 TABLET ORAL at 09:19

## 2025-01-23 RX ADMIN — Medication 400 MG: at 09:19

## 2025-01-23 RX ADMIN — MICONAZOLE NITRATE: 2 OINTMENT TOPICAL at 11:20

## 2025-01-23 RX ADMIN — METRONIDAZOLE 500 MG: 500 INJECTION, SOLUTION INTRAVENOUS at 19:12

## 2025-01-23 RX ADMIN — IPRATROPIUM BROMIDE AND ALBUTEROL SULFATE 1 DOSE: 2.5; .5 SOLUTION RESPIRATORY (INHALATION) at 14:41

## 2025-01-23 RX ADMIN — BUSPIRONE HYDROCHLORIDE 7.5 MG: 7.5 TABLET ORAL at 09:19

## 2025-01-23 RX ADMIN — SODIUM CHLORIDE, PRESERVATIVE FREE 10 ML: 5 INJECTION INTRAVENOUS at 21:51

## 2025-01-23 RX ADMIN — Medication 40 MG: at 21:50

## 2025-01-23 RX ADMIN — CEFEPIME 1000 MG: 1 INJECTION, POWDER, FOR SOLUTION INTRAMUSCULAR; INTRAVENOUS at 12:50

## 2025-01-23 RX ADMIN — ESCITALOPRAM OXALATE 10 MG: 10 TABLET ORAL at 09:19

## 2025-01-23 RX ADMIN — ATORVASTATIN CALCIUM 20 MG: 10 TABLET, FILM COATED ORAL at 21:50

## 2025-01-23 RX ADMIN — IPRATROPIUM BROMIDE AND ALBUTEROL SULFATE 1 DOSE: 2.5; .5 SOLUTION RESPIRATORY (INHALATION) at 20:00

## 2025-01-23 RX ADMIN — IPRATROPIUM BROMIDE AND ALBUTEROL SULFATE 1 DOSE: 2.5; .5 SOLUTION RESPIRATORY (INHALATION) at 08:14

## 2025-01-23 RX ADMIN — METRONIDAZOLE 500 MG: 500 INJECTION, SOLUTION INTRAVENOUS at 03:54

## 2025-01-23 RX ADMIN — Medication 40 MG: at 09:19

## 2025-01-23 ASSESSMENT — PULMONARY FUNCTION TESTS
PIF_VALUE: 32
PIF_VALUE: 31

## 2025-01-23 ASSESSMENT — PAIN SCALES - GENERAL: PAINLEVEL_OUTOF10: 0

## 2025-01-24 ENCOUNTER — APPOINTMENT (OUTPATIENT)
Dept: GENERAL RADIOLOGY | Age: 85
DRG: 444 | End: 2025-01-24
Payer: MEDICARE

## 2025-01-24 LAB
ALBUMIN SERPL-MCNC: 2.4 G/DL (ref 3.4–5)
ANION GAP SERPL CALCULATED.3IONS-SCNC: 10 MMOL/L (ref 3–16)
BASE EXCESS BLDA CALC-SCNC: -7.3 MMOL/L (ref -3–3)
BASOPHILS # BLD: 0.1 K/UL (ref 0–0.2)
BASOPHILS NFR BLD: 0.8 %
BUN SERPL-MCNC: 50 MG/DL (ref 7–20)
CALCIUM SERPL-MCNC: 7.6 MG/DL (ref 8.3–10.6)
CHLORIDE SERPL-SCNC: 111 MMOL/L (ref 99–110)
CO2 BLDA-SCNC: 22 MMOL/L
CO2 SERPL-SCNC: 22 MMOL/L (ref 21–32)
COHGB MFR BLDA: 0.3 % (ref 0–1.5)
CREAT SERPL-MCNC: 2.3 MG/DL (ref 0.6–1.2)
DEPRECATED RDW RBC AUTO: 16.6 % (ref 12.4–15.4)
EOSINOPHIL # BLD: 0.1 K/UL (ref 0–0.6)
EOSINOPHIL NFR BLD: 0.3 %
GFR SERPLBLD CREATININE-BSD FMLA CKD-EPI: 21 ML/MIN/{1.73_M2}
GLUCOSE BLD-MCNC: 122 MG/DL (ref 70–99)
GLUCOSE BLD-MCNC: 145 MG/DL (ref 70–99)
GLUCOSE BLD-MCNC: 160 MG/DL (ref 70–99)
GLUCOSE BLD-MCNC: 170 MG/DL (ref 70–99)
GLUCOSE BLD-MCNC: 188 MG/DL (ref 70–99)
GLUCOSE SERPL-MCNC: 105 MG/DL (ref 70–99)
HCO3 BLDA-SCNC: 20.4 MMOL/L (ref 21–29)
HCT VFR BLD AUTO: 29.7 % (ref 36–48)
HGB BLD-MCNC: 9.8 G/DL (ref 12–16)
HGB BLDA-MCNC: 10.9 G/DL (ref 12–16)
LYMPHOCYTES # BLD: 0.7 K/UL (ref 1–5.1)
LYMPHOCYTES NFR BLD: 4.5 %
MCH RBC QN AUTO: 31 PG (ref 26–34)
MCHC RBC AUTO-ENTMCNC: 33.2 G/DL (ref 31–36)
MCV RBC AUTO: 93.4 FL (ref 80–100)
METHGB MFR BLDA: 0.4 %
MONOCYTES # BLD: 0.8 K/UL (ref 0–1.3)
MONOCYTES NFR BLD: 4.9 %
NEUTROPHILS # BLD: 13.8 K/UL (ref 1.7–7.7)
NEUTROPHILS NFR BLD: 89.5 %
O2 THERAPY: ABNORMAL
PCO2 BLDA: 51.2 MMHG (ref 35–45)
PERFORMED ON: ABNORMAL
PH BLDA: 7.22 [PH] (ref 7.35–7.45)
PHOSPHATE SERPL-MCNC: 4.1 MG/DL (ref 2.5–4.9)
PLATELET # BLD AUTO: 95 K/UL (ref 135–450)
PLATELET BLD QL SMEAR: ABNORMAL
PMV BLD AUTO: 9.3 FL (ref 5–10.5)
PO2 BLDA: 87.3 MMHG (ref 75–108)
POTASSIUM SERPL-SCNC: 4.2 MMOL/L (ref 3.5–5.1)
RBC # BLD AUTO: 3.18 M/UL (ref 4–5.2)
REASON FOR REJECTION: NORMAL
REJECTED TEST: NORMAL
SAO2 % BLDA: 94.7 %
SLIDE REVIEW: ABNORMAL
SODIUM SERPL-SCNC: 143 MMOL/L (ref 136–145)
WBC # BLD AUTO: 15.5 K/UL (ref 4–11)

## 2025-01-24 PROCEDURE — 80069 RENAL FUNCTION PANEL: CPT

## 2025-01-24 PROCEDURE — 82803 BLOOD GASES ANY COMBINATION: CPT

## 2025-01-24 PROCEDURE — 6370000000 HC RX 637 (ALT 250 FOR IP): Performed by: INTERNAL MEDICINE

## 2025-01-24 PROCEDURE — 2500000003 HC RX 250 WO HCPCS: Performed by: INTERNAL MEDICINE

## 2025-01-24 PROCEDURE — 94761 N-INVAS EAR/PLS OXIMETRY MLT: CPT

## 2025-01-24 PROCEDURE — 99233 SBSQ HOSP IP/OBS HIGH 50: CPT | Performed by: INTERNAL MEDICINE

## 2025-01-24 PROCEDURE — 94640 AIRWAY INHALATION TREATMENT: CPT

## 2025-01-24 PROCEDURE — 71045 X-RAY EXAM CHEST 1 VIEW: CPT

## 2025-01-24 PROCEDURE — 2580000003 HC RX 258: Performed by: INTERNAL MEDICINE

## 2025-01-24 PROCEDURE — 6360000002 HC RX W HCPCS: Performed by: INTERNAL MEDICINE

## 2025-01-24 PROCEDURE — 2000000000 HC ICU R&B

## 2025-01-24 PROCEDURE — 87070 CULTURE OTHR SPECIMN AEROBIC: CPT

## 2025-01-24 PROCEDURE — 94660 CPAP INITIATION&MGMT: CPT

## 2025-01-24 PROCEDURE — 2700000000 HC OXYGEN THERAPY PER DAY

## 2025-01-24 PROCEDURE — 85025 COMPLETE CBC W/AUTO DIFF WBC: CPT

## 2025-01-24 PROCEDURE — 87205 SMEAR GRAM STAIN: CPT

## 2025-01-24 RX ADMIN — BUDESONIDE 500 MCG: 0.5 INHALANT RESPIRATORY (INHALATION) at 08:20

## 2025-01-24 RX ADMIN — METRONIDAZOLE 500 MG: 500 INJECTION, SOLUTION INTRAVENOUS at 12:24

## 2025-01-24 RX ADMIN — MICONAZOLE NITRATE: 2 OINTMENT TOPICAL at 08:05

## 2025-01-24 RX ADMIN — SODIUM CHLORIDE, PRESERVATIVE FREE 10 ML: 5 INJECTION INTRAVENOUS at 08:05

## 2025-01-24 RX ADMIN — SODIUM CHLORIDE, PRESERVATIVE FREE 10 ML: 5 INJECTION INTRAVENOUS at 20:19

## 2025-01-24 RX ADMIN — BUSPIRONE HYDROCHLORIDE 7.5 MG: 7.5 TABLET ORAL at 20:17

## 2025-01-24 RX ADMIN — PREDNISONE 30 MG: 20 TABLET ORAL at 08:04

## 2025-01-24 RX ADMIN — BUSPIRONE HYDROCHLORIDE 7.5 MG: 7.5 TABLET ORAL at 08:04

## 2025-01-24 RX ADMIN — Medication 40 MG: at 20:17

## 2025-01-24 RX ADMIN — LEVOTHYROXINE SODIUM 25 MCG: 25 TABLET ORAL at 08:04

## 2025-01-24 RX ADMIN — Medication 400 MG: at 08:04

## 2025-01-24 RX ADMIN — ESCITALOPRAM OXALATE 10 MG: 10 TABLET ORAL at 08:04

## 2025-01-24 RX ADMIN — MICONAZOLE NITRATE: 2 OINTMENT TOPICAL at 20:18

## 2025-01-24 RX ADMIN — IPRATROPIUM BROMIDE AND ALBUTEROL SULFATE 1 DOSE: 2.5; .5 SOLUTION RESPIRATORY (INHALATION) at 12:10

## 2025-01-24 RX ADMIN — IPRATROPIUM BROMIDE AND ALBUTEROL SULFATE 1 DOSE: 2.5; .5 SOLUTION RESPIRATORY (INHALATION) at 15:55

## 2025-01-24 RX ADMIN — BUDESONIDE 500 MCG: 0.5 INHALANT RESPIRATORY (INHALATION) at 19:58

## 2025-01-24 RX ADMIN — ATORVASTATIN CALCIUM 20 MG: 10 TABLET, FILM COATED ORAL at 20:17

## 2025-01-24 RX ADMIN — IPRATROPIUM BROMIDE AND ALBUTEROL SULFATE 1 DOSE: 2.5; .5 SOLUTION RESPIRATORY (INHALATION) at 19:58

## 2025-01-24 RX ADMIN — CEFEPIME 1000 MG: 1 INJECTION, POWDER, FOR SOLUTION INTRAMUSCULAR; INTRAVENOUS at 00:34

## 2025-01-24 RX ADMIN — CEFEPIME 1000 MG: 1 INJECTION, POWDER, FOR SOLUTION INTRAMUSCULAR; INTRAVENOUS at 12:21

## 2025-01-24 RX ADMIN — IPRATROPIUM BROMIDE AND ALBUTEROL SULFATE 1 DOSE: 2.5; .5 SOLUTION RESPIRATORY (INHALATION) at 08:19

## 2025-01-24 RX ADMIN — METRONIDAZOLE 500 MG: 500 INJECTION, SOLUTION INTRAVENOUS at 18:04

## 2025-01-24 RX ADMIN — METRONIDAZOLE 500 MG: 500 INJECTION, SOLUTION INTRAVENOUS at 03:06

## 2025-01-24 RX ADMIN — Medication 40 MG: at 08:04

## 2025-01-24 NOTE — CARE COORDINATION
Per Dr. Wilkinson in rounds will give patient one more day to see how she does. Will discuss plan at that time.     Patient accepted at [facility]. Plan TBD.

## 2025-01-25 PROBLEM — I63.432 CEREBROVASCULAR ACCIDENT (CVA) DUE TO EMBOLISM OF LEFT POSTERIOR CEREBRAL ARTERY (HCC): Status: ACTIVE | Noted: 2025-01-21

## 2025-01-25 LAB
ALBUMIN SERPL-MCNC: 2.3 G/DL (ref 3.4–5)
ANION GAP SERPL CALCULATED.3IONS-SCNC: 9 MMOL/L (ref 3–16)
BASE EXCESS BLDA CALC-SCNC: -6.4 MMOL/L (ref -3–3)
BASOPHILS # BLD: 0.1 K/UL (ref 0–0.2)
BASOPHILS NFR BLD: 0.4 %
BUN SERPL-MCNC: 52 MG/DL (ref 7–20)
CALCIUM SERPL-MCNC: 7.5 MG/DL (ref 8.3–10.6)
CHLORIDE SERPL-SCNC: 114 MMOL/L (ref 99–110)
CO2 BLDA-SCNC: 22.6 MMOL/L
CO2 SERPL-SCNC: 22 MMOL/L (ref 21–32)
COHGB MFR BLDA: 0.7 % (ref 0–1.5)
CREAT SERPL-MCNC: 2.1 MG/DL (ref 0.6–1.2)
DEPRECATED RDW RBC AUTO: 16.8 % (ref 12.4–15.4)
EOSINOPHIL # BLD: 0.1 K/UL (ref 0–0.6)
EOSINOPHIL NFR BLD: 0.5 %
GFR SERPLBLD CREATININE-BSD FMLA CKD-EPI: 23 ML/MIN/{1.73_M2}
GLUCOSE BLD-MCNC: 141 MG/DL (ref 70–99)
GLUCOSE BLD-MCNC: 178 MG/DL (ref 70–99)
GLUCOSE BLD-MCNC: 185 MG/DL (ref 70–99)
GLUCOSE BLD-MCNC: 199 MG/DL (ref 70–99)
GLUCOSE BLD-MCNC: 202 MG/DL (ref 70–99)
GLUCOSE SERPL-MCNC: 131 MG/DL (ref 70–99)
HCO3 BLDA-SCNC: 21 MMOL/L (ref 21–29)
HCT VFR BLD AUTO: 31.3 % (ref 36–48)
HGB BLD-MCNC: 10.1 G/DL (ref 12–16)
HGB BLDA-MCNC: 10.5 G/DL (ref 12–16)
LYMPHOCYTES # BLD: 0.5 K/UL (ref 1–5.1)
LYMPHOCYTES NFR BLD: 3.2 %
MCH RBC QN AUTO: 31.7 PG (ref 26–34)
MCHC RBC AUTO-ENTMCNC: 32.2 G/DL (ref 31–36)
MCV RBC AUTO: 98.7 FL (ref 80–100)
METHGB MFR BLDA: 0.3 %
MONOCYTES # BLD: 0.6 K/UL (ref 0–1.3)
MONOCYTES NFR BLD: 3.9 %
NEUTROPHILS # BLD: 15 K/UL (ref 1.7–7.7)
NEUTROPHILS NFR BLD: 92 %
O2 THERAPY: ABNORMAL
PCO2 BLDA: 50.4 MMHG (ref 35–45)
PERFORMED ON: ABNORMAL
PH BLDA: 7.24 [PH] (ref 7.35–7.45)
PHOSPHATE SERPL-MCNC: 3.6 MG/DL (ref 2.5–4.9)
PLATELET # BLD AUTO: 102 K/UL (ref 135–450)
PMV BLD AUTO: 10.5 FL (ref 5–10.5)
PO2 BLDA: 73.5 MMHG (ref 75–108)
POTASSIUM SERPL-SCNC: 4.1 MMOL/L (ref 3.5–5.1)
RBC # BLD AUTO: 3.17 M/UL (ref 4–5.2)
SAO2 % BLDA: 92 %
SODIUM SERPL-SCNC: 145 MMOL/L (ref 136–145)
WBC # BLD AUTO: 16.3 K/UL (ref 4–11)

## 2025-01-25 PROCEDURE — 87205 SMEAR GRAM STAIN: CPT

## 2025-01-25 PROCEDURE — 87102 FUNGUS ISOLATION CULTURE: CPT

## 2025-01-25 PROCEDURE — 2700000000 HC OXYGEN THERAPY PER DAY

## 2025-01-25 PROCEDURE — 2500000003 HC RX 250 WO HCPCS: Performed by: INTERNAL MEDICINE

## 2025-01-25 PROCEDURE — 6370000000 HC RX 637 (ALT 250 FOR IP): Performed by: INTERNAL MEDICINE

## 2025-01-25 PROCEDURE — 6360000002 HC RX W HCPCS: Performed by: INTERNAL MEDICINE

## 2025-01-25 PROCEDURE — 94640 AIRWAY INHALATION TREATMENT: CPT

## 2025-01-25 PROCEDURE — 82803 BLOOD GASES ANY COMBINATION: CPT

## 2025-01-25 PROCEDURE — 88342 IMHCHEM/IMCYTCHM 1ST ANTB: CPT

## 2025-01-25 PROCEDURE — 80069 RENAL FUNCTION PANEL: CPT

## 2025-01-25 PROCEDURE — 2580000003 HC RX 258: Performed by: INTERNAL MEDICINE

## 2025-01-25 PROCEDURE — 87116 MYCOBACTERIA CULTURE: CPT

## 2025-01-25 PROCEDURE — 99233 SBSQ HOSP IP/OBS HIGH 50: CPT | Performed by: INTERNAL MEDICINE

## 2025-01-25 PROCEDURE — 2000000000 HC ICU R&B

## 2025-01-25 PROCEDURE — 0BJ08ZZ INSPECTION OF TRACHEOBRONCHIAL TREE, VIA NATURAL OR ARTIFICIAL OPENING ENDOSCOPIC: ICD-10-PCS | Performed by: INTERNAL MEDICINE

## 2025-01-25 PROCEDURE — 6370000000 HC RX 637 (ALT 250 FOR IP)

## 2025-01-25 PROCEDURE — 31622 DX BRONCHOSCOPE/WASH: CPT

## 2025-01-25 PROCEDURE — 94660 CPAP INITIATION&MGMT: CPT

## 2025-01-25 PROCEDURE — 87070 CULTURE OTHR SPECIMN AEROBIC: CPT

## 2025-01-25 PROCEDURE — 87206 SMEAR FLUORESCENT/ACID STAI: CPT

## 2025-01-25 PROCEDURE — 94761 N-INVAS EAR/PLS OXIMETRY MLT: CPT

## 2025-01-25 PROCEDURE — 88305 TISSUE EXAM BY PATHOLOGIST: CPT

## 2025-01-25 PROCEDURE — 0B9C8ZX DRAINAGE OF RIGHT UPPER LUNG LOBE, VIA NATURAL OR ARTIFICIAL OPENING ENDOSCOPIC, DIAGNOSTIC: ICD-10-PCS | Performed by: INTERNAL MEDICINE

## 2025-01-25 PROCEDURE — 88112 CYTOPATH CELL ENHANCE TECH: CPT

## 2025-01-25 PROCEDURE — 0BC78ZZ EXTIRPATION OF MATTER FROM LEFT MAIN BRONCHUS, VIA NATURAL OR ARTIFICIAL OPENING ENDOSCOPIC: ICD-10-PCS | Performed by: INTERNAL MEDICINE

## 2025-01-25 PROCEDURE — 85025 COMPLETE CBC W/AUTO DIFF WBC: CPT

## 2025-01-25 RX ORDER — LIDOCAINE HYDROCHLORIDE 40 MG/ML
SOLUTION TOPICAL
Status: COMPLETED
Start: 2025-01-25 | End: 2025-01-25

## 2025-01-25 RX ORDER — ACETAMINOPHEN 325 MG/1
650 TABLET ORAL EVERY 4 HOURS PRN
Status: DISCONTINUED | OUTPATIENT
Start: 2025-01-25 | End: 2025-01-27 | Stop reason: HOSPADM

## 2025-01-25 RX ADMIN — LEVOTHYROXINE SODIUM 25 MCG: 25 TABLET ORAL at 05:41

## 2025-01-25 RX ADMIN — BUSPIRONE HYDROCHLORIDE 7.5 MG: 7.5 TABLET ORAL at 20:10

## 2025-01-25 RX ADMIN — METRONIDAZOLE 500 MG: 500 INJECTION, SOLUTION INTRAVENOUS at 11:43

## 2025-01-25 RX ADMIN — IPRATROPIUM BROMIDE AND ALBUTEROL SULFATE 1 DOSE: 2.5; .5 SOLUTION RESPIRATORY (INHALATION) at 15:15

## 2025-01-25 RX ADMIN — Medication 40 MG: at 09:03

## 2025-01-25 RX ADMIN — SODIUM CHLORIDE, PRESERVATIVE FREE 10 ML: 5 INJECTION INTRAVENOUS at 20:10

## 2025-01-25 RX ADMIN — IPRATROPIUM BROMIDE AND ALBUTEROL SULFATE 1 DOSE: 2.5; .5 SOLUTION RESPIRATORY (INHALATION) at 11:45

## 2025-01-25 RX ADMIN — PREDNISONE 30 MG: 20 TABLET ORAL at 09:03

## 2025-01-25 RX ADMIN — CEFEPIME 1000 MG: 1 INJECTION, POWDER, FOR SOLUTION INTRAMUSCULAR; INTRAVENOUS at 12:07

## 2025-01-25 RX ADMIN — METRONIDAZOLE 500 MG: 500 INJECTION, SOLUTION INTRAVENOUS at 19:23

## 2025-01-25 RX ADMIN — BUDESONIDE 500 MCG: 0.5 INHALANT RESPIRATORY (INHALATION) at 20:11

## 2025-01-25 RX ADMIN — LIDOCAINE HYDROCHLORIDE: 40 SOLUTION ORAL at 12:07

## 2025-01-25 RX ADMIN — DOCUSATE SODIUM 100 MG: 100 CAPSULE, LIQUID FILLED ORAL at 09:03

## 2025-01-25 RX ADMIN — MICONAZOLE NITRATE: 2 OINTMENT TOPICAL at 11:43

## 2025-01-25 RX ADMIN — BUDESONIDE 500 MCG: 0.5 INHALANT RESPIRATORY (INHALATION) at 07:30

## 2025-01-25 RX ADMIN — IPRATROPIUM BROMIDE AND ALBUTEROL SULFATE 1 DOSE: 2.5; .5 SOLUTION RESPIRATORY (INHALATION) at 07:30

## 2025-01-25 RX ADMIN — METRONIDAZOLE 500 MG: 500 INJECTION, SOLUTION INTRAVENOUS at 02:35

## 2025-01-25 RX ADMIN — CEFEPIME 1000 MG: 1 INJECTION, POWDER, FOR SOLUTION INTRAMUSCULAR; INTRAVENOUS at 03:37

## 2025-01-25 RX ADMIN — ATORVASTATIN CALCIUM 20 MG: 10 TABLET, FILM COATED ORAL at 20:10

## 2025-01-25 RX ADMIN — Medication 400 MG: at 09:03

## 2025-01-25 RX ADMIN — IPRATROPIUM BROMIDE AND ALBUTEROL SULFATE 1 DOSE: 2.5; .5 SOLUTION RESPIRATORY (INHALATION) at 20:11

## 2025-01-25 RX ADMIN — Medication 40 MG: at 20:10

## 2025-01-25 RX ADMIN — BUSPIRONE HYDROCHLORIDE 7.5 MG: 7.5 TABLET ORAL at 09:03

## 2025-01-25 NOTE — PROCEDURES
PROCEDURE NOTE  Date: 1/25/2025   Name: Terri Smith  YOB: 1940    Procedures    BROCHOSCOPY PROCEDURE NOTE    DATE OF PROCEDURE: 1/25/25    INDICATIONS & HISTORY:  Hypoxia ,.mucus plugs    PREOPERATIVE DIAGNOSIS:    same    POSTOPERATIVE DIAGNOSES:  Same      PROCEDURE PERFORMED:   1-Diagnotic, Fiberoptic Bronchoscopy  2-Bronchial alveolar lavage from **  3-  4-             SURGEON:    Payton Huber     ASSISTANT:   Bronchoscopy Nursing/Technical Team/OR Team as available i    SEDATION:     Regional Anesthesia,       ANESTHESIA:    Lidocaine 2% ,       ANESTHESIOLOGIST:  Per EPIC Note    SPECIMENS:  [x] Bronchial sample(s) for      Fungal smear & culture,   Acid-fast bacillus Smear and Culture,    Gram stain, C&S,    PCP               Cytology               BD glucan              Galactomanin      Description of Procedure:     The patient  identified Terri Smith  and the procedure verified as Flexible Fiberoptic Bronchoscopy with BAL        After the patient was controlled with sedation bronchoscope was introduced through mouth pice [employer] with BiPAP  without difficulty. The scope was then passed into the trachea.  Additional 2% lidocaine was used topically within the airway.  Careful inspection of the tracheal lumen was accomplished. The scope was sequentially passed into all bronchial airways.           Endobronchial findings:   Vocal cord some thick secretions around  Trachea:  Normal mucosa,  Ashley  Normal mucosa thick secretions     Right Main Stem Bronchus  Normal mucosa blocked with mucus   Right Upper Lobe Bronchi Normal mucosa  Right Middle Lobe Bronchi  Normal mucosa  Right Lower Lobe Bronchi (including the Superior segment)  Normal mucosa     Left Main Stem Bronchus Normal mucosa mucus plugs  Left Upper Lobe Bronchus, Upper Division Normal mucosa  Left Upper Lobe Bronchus, Lingula  Normal mucosa  Left Lower Lobe Bronchus (including the Superior segment)   thick scertions

## 2025-01-26 ENCOUNTER — APPOINTMENT (OUTPATIENT)
Dept: GENERAL RADIOLOGY | Age: 85
DRG: 444 | End: 2025-01-26
Payer: MEDICARE

## 2025-01-26 LAB
ALBUMIN SERPL-MCNC: 2.3 G/DL (ref 3.4–5)
ANION GAP SERPL CALCULATED.3IONS-SCNC: 8 MMOL/L (ref 3–16)
BACTERIA SPEC RESP CULT: NORMAL
BASOPHILS # BLD: 0.1 K/UL (ref 0–0.2)
BASOPHILS NFR BLD: 0.3 %
BUN SERPL-MCNC: 62 MG/DL (ref 7–20)
CALCIUM SERPL-MCNC: 7.4 MG/DL (ref 8.3–10.6)
CHLORIDE SERPL-SCNC: 110 MMOL/L (ref 99–110)
CO2 SERPL-SCNC: 21 MMOL/L (ref 21–32)
CREAT SERPL-MCNC: 2.2 MG/DL (ref 0.6–1.2)
DEPRECATED RDW RBC AUTO: 17.2 % (ref 12.4–15.4)
EOSINOPHIL # BLD: 0 K/UL (ref 0–0.6)
EOSINOPHIL NFR BLD: 0.2 %
GFR SERPLBLD CREATININE-BSD FMLA CKD-EPI: 22 ML/MIN/{1.73_M2}
GLUCOSE BLD-MCNC: 218 MG/DL (ref 70–99)
GLUCOSE BLD-MCNC: 227 MG/DL (ref 70–99)
GLUCOSE BLD-MCNC: 264 MG/DL (ref 70–99)
GLUCOSE BLD-MCNC: 276 MG/DL (ref 70–99)
GLUCOSE SERPL-MCNC: 162 MG/DL (ref 70–99)
GRAM STN SPEC: NORMAL
HCT VFR BLD AUTO: 31.5 % (ref 36–48)
HGB BLD-MCNC: 10 G/DL (ref 12–16)
LYMPHOCYTES # BLD: 0.6 K/UL (ref 1–5.1)
LYMPHOCYTES NFR BLD: 3 %
MCH RBC QN AUTO: 31.8 PG (ref 26–34)
MCHC RBC AUTO-ENTMCNC: 31.8 G/DL (ref 31–36)
MCV RBC AUTO: 99.8 FL (ref 80–100)
MONOCYTES # BLD: 0.7 K/UL (ref 0–1.3)
MONOCYTES NFR BLD: 3.8 %
NEUTROPHILS # BLD: 18.1 K/UL (ref 1.7–7.7)
NEUTROPHILS NFR BLD: 92.7 %
PERFORMED ON: ABNORMAL
PHOSPHATE SERPL-MCNC: 3.4 MG/DL (ref 2.5–4.9)
PLATELET # BLD AUTO: 118 K/UL (ref 135–450)
PMV BLD AUTO: 10.6 FL (ref 5–10.5)
POTASSIUM SERPL-SCNC: 4.1 MMOL/L (ref 3.5–5.1)
RBC # BLD AUTO: 3.15 M/UL (ref 4–5.2)
SODIUM SERPL-SCNC: 139 MMOL/L (ref 136–145)
WBC # BLD AUTO: 19.5 K/UL (ref 4–11)

## 2025-01-26 PROCEDURE — 94640 AIRWAY INHALATION TREATMENT: CPT

## 2025-01-26 PROCEDURE — 2580000003 HC RX 258: Performed by: INTERNAL MEDICINE

## 2025-01-26 PROCEDURE — 94761 N-INVAS EAR/PLS OXIMETRY MLT: CPT

## 2025-01-26 PROCEDURE — P9047 ALBUMIN (HUMAN), 25%, 50ML: HCPCS | Performed by: INTERNAL MEDICINE

## 2025-01-26 PROCEDURE — 2500000003 HC RX 250 WO HCPCS: Performed by: INTERNAL MEDICINE

## 2025-01-26 PROCEDURE — 6370000000 HC RX 637 (ALT 250 FOR IP): Performed by: INTERNAL MEDICINE

## 2025-01-26 PROCEDURE — 6360000002 HC RX W HCPCS: Performed by: INTERNAL MEDICINE

## 2025-01-26 PROCEDURE — 2700000000 HC OXYGEN THERAPY PER DAY

## 2025-01-26 PROCEDURE — 85025 COMPLETE CBC W/AUTO DIFF WBC: CPT

## 2025-01-26 PROCEDURE — 2000000000 HC ICU R&B

## 2025-01-26 PROCEDURE — 99233 SBSQ HOSP IP/OBS HIGH 50: CPT | Performed by: INTERNAL MEDICINE

## 2025-01-26 PROCEDURE — 71045 X-RAY EXAM CHEST 1 VIEW: CPT

## 2025-01-26 PROCEDURE — 94669 MECHANICAL CHEST WALL OSCILL: CPT

## 2025-01-26 PROCEDURE — 94660 CPAP INITIATION&MGMT: CPT

## 2025-01-26 PROCEDURE — 80069 RENAL FUNCTION PANEL: CPT

## 2025-01-26 RX ORDER — ALBUMIN (HUMAN) 12.5 G/50ML
25 SOLUTION INTRAVENOUS EVERY 6 HOURS
Status: DISCONTINUED | OUTPATIENT
Start: 2025-01-26 | End: 2025-01-27

## 2025-01-26 RX ORDER — PREDNISONE 20 MG/1
20 TABLET ORAL DAILY
Status: DISCONTINUED | OUTPATIENT
Start: 2025-01-27 | End: 2025-01-27

## 2025-01-26 RX ORDER — DEXMEDETOMIDINE HYDROCHLORIDE 4 UG/ML
.1-1.5 INJECTION, SOLUTION INTRAVENOUS CONTINUOUS
Status: DISCONTINUED | OUTPATIENT
Start: 2025-01-26 | End: 2025-01-27

## 2025-01-26 RX ORDER — FUROSEMIDE 10 MG/ML
40 INJECTION INTRAMUSCULAR; INTRAVENOUS 2 TIMES DAILY
Status: DISCONTINUED | OUTPATIENT
Start: 2025-01-26 | End: 2025-01-27

## 2025-01-26 RX ADMIN — Medication 400 MG: at 09:05

## 2025-01-26 RX ADMIN — MICONAZOLE NITRATE: 2 OINTMENT TOPICAL at 00:59

## 2025-01-26 RX ADMIN — CEFEPIME 1000 MG: 1 INJECTION, POWDER, FOR SOLUTION INTRAMUSCULAR; INTRAVENOUS at 11:46

## 2025-01-26 RX ADMIN — CEFEPIME 1000 MG: 1 INJECTION, POWDER, FOR SOLUTION INTRAMUSCULAR; INTRAVENOUS at 23:30

## 2025-01-26 RX ADMIN — ESCITALOPRAM OXALATE 10 MG: 10 TABLET ORAL at 09:05

## 2025-01-26 RX ADMIN — METRONIDAZOLE 500 MG: 500 INJECTION, SOLUTION INTRAVENOUS at 11:38

## 2025-01-26 RX ADMIN — IPRATROPIUM BROMIDE AND ALBUTEROL SULFATE 1 DOSE: 2.5; .5 SOLUTION RESPIRATORY (INHALATION) at 20:12

## 2025-01-26 RX ADMIN — SODIUM CHLORIDE, PRESERVATIVE FREE 10 ML: 5 INJECTION INTRAVENOUS at 20:00

## 2025-01-26 RX ADMIN — BUDESONIDE 500 MCG: 0.5 INHALANT RESPIRATORY (INHALATION) at 20:13

## 2025-01-26 RX ADMIN — LEVOTHYROXINE SODIUM 25 MCG: 25 TABLET ORAL at 06:04

## 2025-01-26 RX ADMIN — METRONIDAZOLE 500 MG: 500 INJECTION, SOLUTION INTRAVENOUS at 18:25

## 2025-01-26 RX ADMIN — METRONIDAZOLE 500 MG: 500 INJECTION, SOLUTION INTRAVENOUS at 05:35

## 2025-01-26 RX ADMIN — ACETAMINOPHEN 650 MG: 325 TABLET ORAL at 00:22

## 2025-01-26 RX ADMIN — Medication 40 MG: at 20:00

## 2025-01-26 RX ADMIN — DEXMEDETOMIDINE HYDROCHLORIDE 0.2 MCG/KG/HR: 400 INJECTION, SOLUTION INTRAVENOUS at 20:40

## 2025-01-26 RX ADMIN — CEFEPIME 1000 MG: 1 INJECTION, POWDER, FOR SOLUTION INTRAMUSCULAR; INTRAVENOUS at 01:22

## 2025-01-26 RX ADMIN — BUDESONIDE 500 MCG: 0.5 INHALANT RESPIRATORY (INHALATION) at 07:23

## 2025-01-26 RX ADMIN — IPRATROPIUM BROMIDE AND ALBUTEROL SULFATE 1 DOSE: 2.5; .5 SOLUTION RESPIRATORY (INHALATION) at 11:43

## 2025-01-26 RX ADMIN — MICONAZOLE NITRATE: 2 OINTMENT TOPICAL at 09:04

## 2025-01-26 RX ADMIN — IPRATROPIUM BROMIDE AND ALBUTEROL SULFATE 1 DOSE: 2.5; .5 SOLUTION RESPIRATORY (INHALATION) at 07:23

## 2025-01-26 RX ADMIN — IPRATROPIUM BROMIDE AND ALBUTEROL SULFATE 1 DOSE: 2.5; .5 SOLUTION RESPIRATORY (INHALATION) at 15:03

## 2025-01-26 RX ADMIN — Medication 40 MG: at 09:05

## 2025-01-26 RX ADMIN — FUROSEMIDE 40 MG: 10 INJECTION, SOLUTION INTRAMUSCULAR; INTRAVENOUS at 17:19

## 2025-01-26 RX ADMIN — MICONAZOLE NITRATE: 2 OINTMENT TOPICAL at 20:00

## 2025-01-26 RX ADMIN — BUSPIRONE HYDROCHLORIDE 7.5 MG: 7.5 TABLET ORAL at 20:00

## 2025-01-26 RX ADMIN — ATORVASTATIN CALCIUM 20 MG: 10 TABLET, FILM COATED ORAL at 20:00

## 2025-01-26 RX ADMIN — BUSPIRONE HYDROCHLORIDE 7.5 MG: 7.5 TABLET ORAL at 09:05

## 2025-01-26 RX ADMIN — PREDNISONE 30 MG: 20 TABLET ORAL at 09:04

## 2025-01-26 RX ADMIN — ALBUMIN (HUMAN) 25 G: 0.25 INJECTION, SOLUTION INTRAVENOUS at 17:13

## 2025-01-26 RX ADMIN — FUROSEMIDE 40 MG: 10 INJECTION, SOLUTION INTRAMUSCULAR; INTRAVENOUS at 11:40

## 2025-01-26 RX ADMIN — ALBUMIN (HUMAN) 25 G: 0.25 INJECTION, SOLUTION INTRAVENOUS at 21:38

## 2025-01-26 RX ADMIN — LORAZEPAM 0.5 MG: 2 INJECTION INTRAMUSCULAR; INTRAVENOUS at 00:22

## 2025-01-26 RX ADMIN — ALBUMIN (HUMAN) 25 G: 0.25 INJECTION, SOLUTION INTRAVENOUS at 11:31

## 2025-01-26 NOTE — PLAN OF CARE
Problem: Chronic Conditions and Co-morbidities  Goal: Patient's chronic conditions and co-morbidity symptoms are monitored and maintained or improved  1/4/2025 1051 by Safia Salvador RN  Outcome: Progressing  1/4/2025 0442 by Sydni Quiros RN  Outcome: Progressing  Flowsheets (Taken 1/4/2025 0404)  Care Plan - Patient's Chronic Conditions and Co-Morbidity Symptoms are Monitored and Maintained or Improved: Monitor and assess patient's chronic conditions and comorbid symptoms for stability, deterioration, or improvement     Problem: Discharge Planning  Goal: Discharge to home or other facility with appropriate resources  1/4/2025 1051 by Safia Salvador RN  Outcome: Progressing  1/4/2025 0442 by Sydni Quiros RN  Outcome: Progressing  Flowsheets (Taken 1/4/2025 0404)  Discharge to home or other facility with appropriate resources: Identify barriers to discharge with patient and caregiver     Problem: Safety - Adult  Goal: Free from fall injury  Outcome: Progressing  Flowsheets (Taken 1/4/2025 0405 by Sydni Quiros RN)  Free From Fall Injury: Instruct family/caregiver on patient safety     Problem: Skin/Tissue Integrity  Goal: Absence of new skin breakdown  Description: 1.  Monitor for areas of redness and/or skin breakdown  2.  Assess vascular access sites hourly  3.  Every 4-6 hours minimum:  Change oxygen saturation probe site  4.  Every 4-6 hours:  If on nasal continuous positive airway pressure, respiratory therapy assess nares and determine need for appliance change or resting period.  Outcome: Progressing     
  Problem: Chronic Conditions and Co-morbidities  Goal: Patient's chronic conditions and co-morbidity symptoms are monitored and maintained or improved  1/5/2025 0558 by Mena Gage RN  Outcome: Progressing  1/5/2025 0558 by Mena Gage RN  Outcome: Progressing     Problem: Discharge Planning  Goal: Discharge to home or other facility with appropriate resources  1/5/2025 0558 by Mena Gage RN  Outcome: Progressing  1/5/2025 0558 by Mena Gage RN  Outcome: Progressing     Problem: Safety - Adult  Goal: Free from fall injury  1/5/2025 0558 by Mena Gage RN  Outcome: Progressing  1/5/2025 0558 by Mena Gage RN  Outcome: Progressing     Problem: Skin/Tissue Integrity  Goal: Absence of new skin breakdown  Description: 1.  Monitor for areas of redness and/or skin breakdown  2.  Assess vascular access sites hourly  3.  Every 4-6 hours minimum:  Change oxygen saturation probe site  4.  Every 4-6 hours:  If on nasal continuous positive airway pressure, respiratory therapy assess nares and determine need for appliance change or resting period.  1/5/2025 0558 by Mena Gage RN  Outcome: Progressing  1/5/2025 0558 by Mena Gage RN  Outcome: Progressing     Problem: ABCDS Injury Assessment  Goal: Absence of physical injury  1/5/2025 0558 by Mena Gage RN  Outcome: Progressing  1/5/2025 0558 by Mena Gage RN  Outcome: Progressing     
  Problem: Chronic Conditions and Co-morbidities  Goal: Patient's chronic conditions and co-morbidity symptoms are monitored and maintained or improved  1/5/2025 1218 by Safia Salvador RN  Outcome: Progressing     Problem: Discharge Planning  Goal: Discharge to home or other facility with appropriate resources  1/5/2025 1218 by Safia Salvador RN  Outcome: Progressing     Problem: Safety - Adult  Goal: Free from fall injury  1/5/2025 1218 by Safia Salvador RN  Outcome: Progressing     Problem: Skin/Tissue Integrity  Goal: Absence of new skin breakdown  Description: 1.  Monitor for areas of redness and/or skin breakdown  2.  Assess vascular access sites hourly  3.  Every 4-6 hours minimum:  Change oxygen saturation probe site  4.  Every 4-6 hours:  If on nasal continuous positive airway pressure, respiratory therapy assess nares and determine need for appliance change or resting period.  1/5/2025 1218 by Safia Salvador RN  Outcome: Progressing     
  Problem: Chronic Conditions and Co-morbidities  Goal: Patient's chronic conditions and co-morbidity symptoms are monitored and maintained or improved  1/5/2025 1218 by Safia Salvador RN  Outcome: Progressing     Problem: Discharge Planning  Goal: Discharge to home or other facility with appropriate resources  1/5/2025 1218 by Safia Salvador RN  Outcome: Progressing     Problem: Safety - Adult  Goal: Free from fall injury  1/5/2025 1219 by Safia Salvador RN  Outcome: Progressing     Problem: Skin/Tissue Integrity  Goal: Absence of new skin breakdown  Description: 1.  Monitor for areas of redness and/or skin breakdown  2.  Assess vascular access sites hourly  3.  Every 4-6 hours minimum:  Change oxygen saturation probe site  4.  Every 4-6 hours:  If on nasal continuous positive airway pressure, respiratory therapy assess nares and determine need for appliance change or resting period.  1/5/2025 1219 by Safia Salvador RN  Outcome: Progressing     Problem: ABCDS Injury Assessment  Goal: Absence of physical injury  1/5/2025 1219 by Safia Salvador RN  Outcome: Progressing     
  Problem: Chronic Conditions and Co-morbidities  Goal: Patient's chronic conditions and co-morbidity symptoms are monitored and maintained or improved  1/7/2025 1045 by Teresa Baca RN  Outcome: Progressing     Problem: Discharge Planning  Goal: Discharge to home or other facility with appropriate resources  1/7/2025 1045 by Teresa Baca RN  Outcome: Progressing     Problem: Safety - Adult  Goal: Free from fall injury  1/7/2025 1045 by Teresa Baca RN  Outcome: Progressing     Problem: Skin/Tissue Integrity  Goal: Absence of new skin breakdown  Description: 1.  Monitor for areas of redness and/or skin breakdown  2.  Assess vascular access sites hourly  3.  Every 4-6 hours minimum:  Change oxygen saturation probe site  4.  Every 4-6 hours:  If on nasal continuous positive airway pressure, respiratory therapy assess nares and determine need for appliance change or resting period.  1/7/2025 1045 by Teresa Baca RN  Outcome: Progressing     Problem: ABCDS Injury Assessment  Goal: Absence of physical injury  1/7/2025 1045 by Teresa Baca RN  Outcome: Progressing     Problem: Nutrition Deficit:  Goal: Optimize nutritional status  1/7/2025 1045 by Teresa Baca RN  Outcome: Progressing     
  Problem: Chronic Conditions and Co-morbidities  Goal: Patient's chronic conditions and co-morbidity symptoms are monitored and maintained or improved  1/8/2025 0944 by Teresa Baca RN  Outcome: Progressing     Problem: Discharge Planning  Goal: Discharge to home or other facility with appropriate resources  1/8/2025 0944 by Teresa Baca RN  Outcome: Progressing     Problem: Safety - Adult  Goal: Free from fall injury  1/8/2025 0944 by Teresa Baca RN  Outcome: Progressing     Problem: Skin/Tissue Integrity  Goal: Absence of new skin breakdown  Description: 1.  Monitor for areas of redness and/or skin breakdown  2.  Assess vascular access sites hourly  3.  Every 4-6 hours minimum:  Change oxygen saturation probe site  4.  Every 4-6 hours:  If on nasal continuous positive airway pressure, respiratory therapy assess nares and determine need for appliance change or resting period.  1/8/2025 0944 by Teresa Baca RN  Outcome: Progressing     Problem: ABCDS Injury Assessment  Goal: Absence of physical injury  1/8/2025 0944 by Teresa Baca RN  Outcome: Progressing     Problem: Nutrition Deficit:  Goal: Optimize nutritional status  1/8/2025 0944 by Teresa Baca RN  Outcome: Progressing     Problem: Pain  Goal: Verbalizes/displays adequate comfort level or baseline comfort level  1/8/2025 0944 by Teresa Baca RN  Outcome: Progressing     
  Problem: Chronic Conditions and Co-morbidities  Goal: Patient's chronic conditions and co-morbidity symptoms are monitored and maintained or improved  1/8/2025 1015 by Teresa Baca RN  Outcome: Adequate for Discharge     Problem: Discharge Planning  Goal: Discharge to home or other facility with appropriate resources  1/8/2025 1015 by Teresa Baca RN  Outcome: Adequate for Discharge     Problem: Safety - Adult  Goal: Free from fall injury  1/8/2025 1015 by Teresa Baca RN  Outcome: Adequate for Discharge     Problem: Skin/Tissue Integrity  Goal: Absence of new skin breakdown  Description: 1.  Monitor for areas of redness and/or skin breakdown  2.  Assess vascular access sites hourly  3.  Every 4-6 hours minimum:  Change oxygen saturation probe site  4.  Every 4-6 hours:  If on nasal continuous positive airway pressure, respiratory therapy assess nares and determine need for appliance change or resting period.  1/8/2025 1015 by Teresa Baca RN  Outcome: Adequate for Discharge     Problem: ABCDS Injury Assessment  Goal: Absence of physical injury  1/8/2025 1015 by Teresa Baca RN  Outcome: Adequate for Discharge     Problem: Nutrition Deficit:  Goal: Optimize nutritional status  1/8/2025 1015 by Teresa Baca RN  Outcome: Adequate for Discharge     Problem: Pain  Goal: Verbalizes/displays adequate comfort level or baseline comfort level  1/8/2025 1015 by Teresa Baca RN  Outcome: Adequate for Discharge     
  Problem: Chronic Conditions and Co-morbidities  Goal: Patient's chronic conditions and co-morbidity symptoms are monitored and maintained or improved  1/9/2025 0316 by Yenny Olmstead RN  Outcome: Progressing  1/9/2025 0314 by Yenny Olmstead RN  Outcome: Progressing  1/9/2025 0313 by Safia Salvador RN  Outcome: Progressing     Problem: Discharge Planning  Goal: Discharge to home or other facility with appropriate resources  1/9/2025 0316 by Yenny Olmstead RN  Outcome: Progressing  1/9/2025 0314 by Yenny Olmstead RN  Outcome: Progressing  1/9/2025 0313 by Safia Salvador RN  Outcome: Progressing     Problem: Safety - Adult  Goal: Free from fall injury  1/9/2025 0316 by Yenny Olmstead RN  Outcome: Progressing  1/9/2025 0314 by Yenny Olmstead RN  Outcome: Progressing  1/9/2025 0313 by Safia Salvador RN  Outcome: Progressing     Problem: Skin/Tissue Integrity  Goal: Absence of new skin breakdown  Description: 1.  Monitor for areas of redness and/or skin breakdown  2.  Assess vascular access sites hourly  3.  Every 4-6 hours minimum:  Change oxygen saturation probe site  4.  Every 4-6 hours:  If on nasal continuous positive airway pressure, respiratory therapy assess nares and determine need for appliance change or resting period.  1/9/2025 0316 by Yenny Olmstead RN  Outcome: Progressing  1/9/2025 0314 by Yenny Olmstead RN  Outcome: Progressing     Problem: Nutrition Deficit:  Goal: Optimize nutritional status  1/8/2025 1921 by Nazanin Davila RD, LD  Outcome: Not Progressing  Flowsheets (Taken 1/8/2025 1911)  Nutrient intake appropriate for improving, restoring, or maintaining nutritional needs:   Assess nutritional status and recommend course of action   Monitor oral intake, labs, and treatment plans   Recommend appropriate diets, oral nutritional supplements, and vitamin/mineral supplements     
  Problem: Chronic Conditions and Co-morbidities  Goal: Patient's chronic conditions and co-morbidity symptoms are monitored and maintained or improved  1/9/2025 1254 by Jung Lambert RN  Outcome: Progressing  1/9/2025 0316 by Yenny Olmstead RN  Outcome: Progressing  1/9/2025 0314 by Yenny Olmstead RN  Outcome: Progressing  1/9/2025 0313 by Safia Salvador RN  Outcome: Progressing     Problem: Discharge Planning  Goal: Discharge to home or other facility with appropriate resources  1/9/2025 1254 by Jung Lambert RN  Outcome: Progressing  1/9/2025 0316 by Yenny Olmstead RN  Outcome: Progressing  1/9/2025 0314 by Yenny Olmstead RN  Outcome: Progressing  1/9/2025 0313 by Safia Salvador RN  Outcome: Progressing     Problem: Safety - Adult  Goal: Free from fall injury  1/9/2025 1254 by Jung Lambert RN  Outcome: Progressing  1/9/2025 0316 by Yenny Olmstead RN  Outcome: Progressing  1/9/2025 0314 by Yenny Olmstead RN  Outcome: Progressing  1/9/2025 0313 by Safia Salvador RN  Outcome: Progressing     Problem: Skin/Tissue Integrity  Goal: Absence of new skin breakdown  Description: 1.  Monitor for areas of redness and/or skin breakdown  2.  Assess vascular access sites hourly  3.  Every 4-6 hours minimum:  Change oxygen saturation probe site  4.  Every 4-6 hours:  If on nasal continuous positive airway pressure, respiratory therapy assess nares and determine need for appliance change or resting period.  1/9/2025 1254 by Jung Lambert RN  Outcome: Progressing  1/9/2025 0316 by Yenny Olmstead RN  Outcome: Progressing  1/9/2025 0314 by Yenny Olmstead RN  Outcome: Progressing     Problem: ABCDS Injury Assessment  Goal: Absence of physical injury  Outcome: Progressing     Problem: Nutrition Deficit:  Goal: Optimize nutritional status  Outcome: Progressing     Problem: Pain  Goal: Verbalizes/displays adequate comfort level or baseline comfort level  Outcome: Progressing     
  Problem: Chronic Conditions and Co-morbidities  Goal: Patient's chronic conditions and co-morbidity symptoms are monitored and maintained or improved  1/9/2025 1319 by Hillary Bazan RN  Outcome: Adequate for Discharge  1/9/2025 1254 by Jung Lambert RN  Outcome: Progressing     Problem: Discharge Planning  Goal: Discharge to home or other facility with appropriate resources  1/9/2025 1319 by Hillary Bazan RN  Outcome: Adequate for Discharge  1/9/2025 1254 by Jung Lambert RN  Outcome: Progressing   Continuing to work with patient and health care team on discharge plan. Discharge instructions and medication management will be reviewed prior to discharge.    Problem: Safety - Adult  Goal: Free from fall injury  1/9/2025 1319 by Hillary Bazan RN  Outcome: Adequate for Discharge  1/9/2025 1254 by Jung Lambert RN  Outcome: Progressing   Pt free from falls this shift. Fall precautions in place at all times. Call light always within reach. Pt able and agreeable to contact for safety appropriately.    Problem: Skin/Tissue Integrity  Goal: Absence of new skin breakdown  Description: 1.  Monitor for areas of redness and/or skin breakdown  2.  Assess vascular access sites hourly  3.  Every 4-6 hours minimum:  Change oxygen saturation probe site  4.  Every 4-6 hours:  If on nasal continuous positive airway pressure, respiratory therapy assess nares and determine need for appliance change or resting period.  1/9/2025 1319 by Hillary Bazan RN  Outcome: Adequate for Discharge  1/9/2025 1254 by Jung Lambert RN  Outcome: Progressing   Skin assessment performed each shift per protocol.  Patient turned and repositioned every two hours and prn with pillow support. Patient checked for incontence every two hours.     Problem: ABCDS Injury Assessment  Goal: Absence of physical injury  1/9/2025 1319 by Hillary Bazan RN  Outcome: Adequate for Discharge  1/9/2025 1254 by Jung Lambert RN  Outcome: Progressing     Problem: 
  Problem: Chronic Conditions and Co-morbidities  Goal: Patient's chronic conditions and co-morbidity symptoms are monitored and maintained or improved  Outcome: Progressing     Problem: Discharge Planning  Goal: Discharge to home or other facility with appropriate resources  Outcome: Progressing     Problem: Safety - Adult  Goal: Free from fall injury  Outcome: Progressing     Problem: Nutrition Deficit:  Goal: Optimize nutritional status  1/8/2025 1921 by Nazanin Davila RD, LD  Outcome: Not Progressing  Flowsheets (Taken 1/8/2025 1911)  Nutrient intake appropriate for improving, restoring, or maintaining nutritional needs:   Assess nutritional status and recommend course of action   Monitor oral intake, labs, and treatment plans   Recommend appropriate diets, oral nutritional supplements, and vitamin/mineral supplements     
  Problem: Chronic Conditions and Co-morbidities  Goal: Patient's chronic conditions and co-morbidity symptoms are monitored and maintained or improved  Outcome: Progressing     Problem: Discharge Planning  Goal: Discharge to home or other facility with appropriate resources  Outcome: Progressing     Problem: Safety - Adult  Goal: Free from fall injury  Outcome: Progressing     Problem: Skin/Tissue Integrity  Goal: Absence of new skin breakdown  Description: 1.  Monitor for areas of redness and/or skin breakdown  2.  Assess vascular access sites hourly  3.  Every 4-6 hours minimum:  Change oxygen saturation probe site  4.  Every 4-6 hours:  If on nasal continuous positive airway pressure, respiratory therapy assess nares and determine need for appliance change or resting period.  Outcome: Progressing     Problem: ABCDS Injury Assessment  Goal: Absence of physical injury  Outcome: Progressing     
  Problem: Chronic Conditions and Co-morbidities  Goal: Patient's chronic conditions and co-morbidity symptoms are monitored and maintained or improved  Outcome: Progressing     Problem: Discharge Planning  Goal: Discharge to home or other facility with appropriate resources  Outcome: Progressing     Problem: Safety - Adult  Goal: Free from fall injury  Outcome: Progressing     Problem: Skin/Tissue Integrity  Goal: Absence of new skin breakdown  Description: 1.  Monitor for areas of redness and/or skin breakdown  2.  Assess vascular access sites hourly  3.  Every 4-6 hours minimum:  Change oxygen saturation probe site  4.  Every 4-6 hours:  If on nasal continuous positive airway pressure, respiratory therapy assess nares and determine need for appliance change or resting period.  Outcome: Progressing     Problem: ABCDS Injury Assessment  Goal: Absence of physical injury  Outcome: Progressing     Problem: Nutrition Deficit:  Goal: Optimize nutritional status  Outcome: Progressing     
  Problem: Chronic Conditions and Co-morbidities  Goal: Patient's chronic conditions and co-morbidity symptoms are monitored and maintained or improved  Outcome: Progressing  Flowsheets (Taken 1/4/2025 0404)  Care Plan - Patient's Chronic Conditions and Co-Morbidity Symptoms are Monitored and Maintained or Improved: Monitor and assess patient's chronic conditions and comorbid symptoms for stability, deterioration, or improvement     Problem: Discharge Planning  Goal: Discharge to home or other facility with appropriate resources  Outcome: Progressing  Flowsheets (Taken 1/4/2025 0404)  Discharge to home or other facility with appropriate resources: Identify barriers to discharge with patient and caregiver     
  Problem: Chronic Conditions and Co-morbidities  Goal: Patient's chronic conditions and co-morbidity symptoms are monitored and maintained or improved  Outcome: Progressing  Flowsheets (Taken 1/8/2025 0635)  Care Plan - Patient's Chronic Conditions and Co-Morbidity Symptoms are Monitored and Maintained or Improved: Monitor and assess patient's chronic conditions and comorbid symptoms for stability, deterioration, or improvement     Problem: Discharge Planning  Goal: Discharge to home or other facility with appropriate resources  Outcome: Progressing  Flowsheets (Taken 1/8/2025 0635)  Discharge to home or other facility with appropriate resources: Identify barriers to discharge with patient and caregiver     Problem: Safety - Adult  Goal: Free from fall injury  Outcome: Progressing  Flowsheets (Taken 1/8/2025 0635)  Free From Fall Injury:   Based on caregiver fall risk screen, instruct family/caregiver to ask for assistance with transferring infant if caregiver noted to have fall risk factors   Instruct family/caregiver on patient safety     Problem: Skin/Tissue Integrity  Goal: Absence of new skin breakdown  Description: 1.  Monitor for areas of redness and/or skin breakdown  2.  Assess vascular access sites hourly  3.  Every 4-6 hours minimum:  Change oxygen saturation probe site  4.  Every 4-6 hours:  If on nasal continuous positive airway pressure, respiratory therapy assess nares and determine need for appliance change or resting period.  Outcome: Progressing     Problem: ABCDS Injury Assessment  Goal: Absence of physical injury  Outcome: Progressing  Flowsheets (Taken 1/8/2025 0635)  Absence of Physical Injury: Implement safety measures based on patient assessment     Problem: Nutrition Deficit:  Goal: Optimize nutritional status  Outcome: Progressing     Problem: Pain  Goal: Verbalizes/displays adequate comfort level or baseline comfort level  Outcome: Progressing  Flowsheets (Taken 1/8/2025 
  Problem: Genitourinary - Adult  Goal: Absence of urinary retention  Outcome: Progressing     Problem: Metabolic/Fluid and Electrolytes - Adult  Goal: Electrolytes maintained within normal limits  Outcome: Progressing  Goal: Hemodynamic stability and optimal renal function maintained  Outcome: Progressing  Goal: Glucose maintained within prescribed range  Outcome: Progressing     
  Problem: Nutrition Deficit:  Goal: Optimize nutritional status  Outcome: Progressing     Problem: Respiratory - Adult  Goal: Achieves optimal ventilation and oxygenation  Outcome: Progressing     Problem: Skin/Tissue Integrity - Adult  Goal: Oral mucous membranes remain intact  Outcome: Progressing     Problem: Musculoskeletal - Adult  Goal: Maintain proper alignment of affected body part  Outcome: Progressing     
  Problem: Nutrition Deficit:  Goal: Optimize nutritional status  Outcome: Progressing     Problem: Respiratory - Adult  Goal: Achieves optimal ventilation and oxygenation  Outcome: Progressing     Problem: Skin/Tissue Integrity - Adult  Goal: Skin integrity remains intact  Outcome: Progressing     Problem: Metabolic/Fluid and Electrolytes - Adult  Goal: Hemodynamic stability and optimal renal function maintained  Outcome: Progressing     Problem: Safety - Medical Restraint  Goal: Remains free of injury from restraints (Restraint for Interference with Medical Device)  Description: INTERVENTIONS:  1. Determine that other, less restrictive measures have been tried or would not be effective before applying the restraint  2. Evaluate the patient's condition at the time of restraint application  3. Inform patient/family regarding the reason for restraint  4. Q2H: Monitor safety, psychosocial status, comfort, nutrition and hydration  Outcome: Progressing  Flowsheets  Taken 1/20/2025 1400 by Alba Pack RN  Remains free of injury from restraints (restraint for interference with medical device): Every 2 hours: Monitor safety, psychosocial status, comfort, nutrition and hydration  Taken 1/20/2025 1224 by Alba Pack RN  Remains free of injury from restraints (restraint for interference with medical device): Every 2 hours: Monitor safety, psychosocial status, comfort, nutrition and hydration  Taken 1/20/2025 1200 by Alba Pack RN  Remains free of injury from restraints (restraint for interference with medical device): Every 2 hours: Monitor safety, psychosocial status, comfort, nutrition and hydration  Taken 1/20/2025 1000 by Alba Pack RN  Remains free of injury from restraints (restraint for interference with medical device): Every 2 hours: Monitor safety, psychosocial status, comfort, nutrition and hydration  Taken 1/20/2025 0800 by Alba Pack RN  Remains free of injury 
  Problem: Nutrition Deficit:  Goal: Optimize nutritional status  Outcome: Progressing     Problem: Respiratory - Adult  Goal: Achieves optimal ventilation and oxygenation  Outcome: Progressing     Problem: Skin/Tissue Integrity - Adult  Goal: Skin integrity remains intact  Outcome: Progressing     Problem: Musculoskeletal - Adult  Goal: Return mobility to safest level of function  Outcome: Progressing     Problem: Genitourinary - Adult  Goal: Absence of urinary retention  Outcome: Progressing     Problem: Metabolic/Fluid and Electrolytes - Adult  Goal: Electrolytes maintained within normal limits  Outcome: Progressing  Goal: Hemodynamic stability and optimal renal function maintained  Outcome: Progressing  Goal: Glucose maintained within prescribed range  Outcome: Progressing     Problem: Safety - Medical Restraint  Goal: Remains free of injury from restraints (Restraint for Interference with Medical Device)  Description: INTERVENTIONS:  1. Determine that other, less restrictive measures have been tried or would not be effective before applying the restraint  2. Evaluate the patient's condition at the time of restraint application  3. Inform patient/family regarding the reason for restraint  4. Q2H: Monitor safety, psychosocial status, comfort, nutrition and hydration  Outcome: Progressing  Flowsheets  Taken 1/23/2025 1000 by Alba Pack RN  Remains free of injury from restraints (restraint for interference with medical device): Every 2 hours: Monitor safety, psychosocial status, comfort, nutrition and hydration  Taken 1/23/2025 0800 by Alba Pack RN  Remains free of injury from restraints (restraint for interference with medical device): Every 2 hours: Monitor safety, psychosocial status, comfort, nutrition and hydration  Taken 1/23/2025 0730 by Emily Menendez RN  Remains free of injury from restraints (restraint for interference with medical device): Every 2 hours: Monitor safety, 
  Problem: Nutrition Deficit:  Goal: Optimize nutritional status  Outcome: Progressing     Problem: Respiratory - Adult  Goal: Achieves optimal ventilation and oxygenation  Outcome: Progressing     Problem: Skin/Tissue Integrity - Adult  Goal: Skin integrity remains intact  Outcome: Progressing     Problem: Musculoskeletal - Adult  Goal: Return mobility to safest level of function  Outcome: Progressing     Problem: Genitourinary - Adult  Goal: Absence of urinary retention  Outcome: Progressing     Problem: Metabolic/Fluid and Electrolytes - Adult  Goal: Hemodynamic stability and optimal renal function maintained  Outcome: Progressing     Problem: Safety - Medical Restraint  Goal: Remains free of injury from restraints (Restraint for Interference with Medical Device)  Description: INTERVENTIONS:  1. Determine that other, less restrictive measures have been tried or would not be effective before applying the restraint  2. Evaluate the patient's condition at the time of restraint application  3. Inform patient/family regarding the reason for restraint  4. Q2H: Monitor safety, psychosocial status, comfort, nutrition and hydration  Outcome: Progressing     
  Problem: Nutrition Deficit:  Goal: Optimize nutritional status  Outcome: Progressing     Problem: Respiratory - Adult  Goal: Achieves optimal ventilation and oxygenation  Outcome: Progressing     Problem: Skin/Tissue Integrity - Adult  Goal: Skin integrity remains intact  Outcome: Progressing  Goal: Oral mucous membranes remain intact  Outcome: Progressing     Problem: Musculoskeletal - Adult  Goal: Return mobility to safest level of function  Outcome: Progressing  Goal: Maintain proper alignment of affected body part  Outcome: Progressing     Problem: Genitourinary - Adult  Goal: Absence of urinary retention  Outcome: Progressing     Problem: Metabolic/Fluid and Electrolytes - Adult  Goal: Electrolytes maintained within normal limits  Outcome: Progressing  Goal: Glucose maintained within prescribed range  Outcome: Progressing     
  Problem: Nutrition Deficit:  Goal: Optimize nutritional status  Outcome: Progressing     Problem: Respiratory - Adult  Goal: Achieves optimal ventilation and oxygenation  Outcome: Progressing     Problem: Skin/Tissue Integrity - Adult  Goal: Skin integrity remains intact  Outcome: Progressing  Goal: Oral mucous membranes remain intact  Outcome: Progressing     Problem: Musculoskeletal - Adult  Goal: Return mobility to safest level of function  Outcome: Progressing  Goal: Maintain proper alignment of affected body part  Outcome: Progressing     Problem: Genitourinary - Adult  Goal: Absence of urinary retention  Outcome: Progressing     Problem: Metabolic/Fluid and Electrolytes - Adult  Goal: Electrolytes maintained within normal limits  Outcome: Progressing  Goal: Hemodynamic stability and optimal renal function maintained  Outcome: Progressing  Goal: Glucose maintained within prescribed range  Outcome: Progressing     
  Problem: Nutrition Deficit:  Goal: Optimize nutritional status  Recent Flowsheet Documentation  Taken 1/14/2025 1207 by Kailyn Malcolm RD, MILENA  Nutrient intake appropriate for improving, restoring, or maintaining nutritional needs:   Assess nutritional status and recommend course of action   Recommend, monitor, and adjust tube feedings and TPN/PPN based on assessed needs     Problem: Nutrition Deficit:  Goal: Optimize nutritional status  Outcome: Progressing  Flowsheets (Taken 1/14/2025 1207 by Kailyn Malcolm RD, MILENA)  Nutrient intake appropriate for improving, restoring, or maintaining nutritional needs:   Assess nutritional status and recommend course of action   Recommend, monitor, and adjust tube feedings and TPN/PPN based on assessed needs     Problem: Respiratory - Adult  Goal: Achieves optimal ventilation and oxygenation  Outcome: Progressing     Problem: Skin/Tissue Integrity - Adult  Goal: Skin integrity remains intact  Outcome: Progressing  Goal: Oral mucous membranes remain intact  Outcome: Progressing     Problem: Musculoskeletal - Adult  Goal: Return mobility to safest level of function  Outcome: Progressing  Goal: Maintain proper alignment of affected body part  Outcome: Progressing     Problem: Genitourinary - Adult  Goal: Absence of urinary retention  Outcome: Progressing     Problem: Metabolic/Fluid and Electrolytes - Adult  Goal: Electrolytes maintained within normal limits  Outcome: Progressing  Goal: Hemodynamic stability and optimal renal function maintained  Outcome: Progressing  Goal: Glucose maintained within prescribed range  Outcome: Progressing     
  Problem: Respiratory - Adult  Goal: Achieves optimal ventilation and oxygenation  1/11/2025 2220 by Arely Moncada, RN  Outcome: Progressing     Problem: Skin/Tissue Integrity - Adult  Goal: Skin integrity remains intact  Outcome: Progressing     Problem: Skin/Tissue Integrity - Adult  Goal: Oral mucous membranes remain intact  Outcome: Progressing     Problem: Genitourinary - Adult  Goal: Absence of urinary retention  Outcome: Progressing     Problem: Metabolic/Fluid and Electrolytes - Adult  Goal: Electrolytes maintained within normal limits  1/11/2025 2220 by Arely Moncada, RN  Outcome: Progressing     
  Problem: Respiratory - Adult  Goal: Achieves optimal ventilation and oxygenation  1/12/2025 2330 by Arely Moncada, RN  Outcome: Progressing     Problem: Genitourinary - Adult  Goal: Absence of urinary retention  Outcome: Progressing     Problem: Metabolic/Fluid and Electrolytes - Adult  Goal: Electrolytes maintained within normal limits  1/12/2025 2330 by Arely Moncada, RN  Outcome: Progressing     Problem: Metabolic/Fluid and Electrolytes - Adult  Goal: Glucose maintained within prescribed range  Outcome: Progressing     
  Problem: Respiratory - Adult  Goal: Achieves optimal ventilation and oxygenation  1/20/2025 2110 by Amish Araiza RN  Outcome: Progressing  Flowsheets (Taken 1/20/2025 2000)  Achieves optimal ventilation and oxygenation:   Assess for changes in respiratory status   Assess for changes in mentation and behavior   Position to facilitate oxygenation and minimize respiratory effort   Oxygen supplementation based on oxygen saturation or arterial blood gases  1/20/2025 1552 by Alba Pack, RN  Outcome: Progressing     Problem: Skin/Tissue Integrity - Adult  Goal: Skin integrity remains intact  1/20/2025 2110 by Amish Araiza RN  Outcome: Progressing  Flowsheets (Taken 1/20/2025 2000)  Skin Integrity Remains Intact:   Monitor for areas of redness and/or skin breakdown   Assess vascular access sites hourly   Every 4-6 hours minimum: Change oxygen saturation probe site   Every 4-6 hours: If on nasal continuous positive airway pressure, respiratory therapy assesses nares and determine need for appliance change or resting period  1/20/2025 1552 by Alba Pack RN  Outcome: Progressing  Goal: Oral mucous membranes remain intact  Outcome: Progressing     
  Problem: Respiratory - Adult  Goal: Achieves optimal ventilation and oxygenation  Outcome: Progressing     Problem: Metabolic/Fluid and Electrolytes - Adult  Goal: Electrolytes maintained within normal limits  Outcome: Progressing     
  Problem: Respiratory - Adult  Goal: Achieves optimal ventilation and oxygenation  Outcome: Progressing     Problem: Skin/Tissue Integrity - Adult  Goal: Skin integrity remains intact  Outcome: Progressing     Problem: Metabolic/Fluid and Electrolytes - Adult  Goal: Electrolytes maintained within normal limits  Outcome: Progressing     
  Problem: Respiratory - Adult  Goal: Achieves optimal ventilation and oxygenation  Outcome: Progressing     Problem: Skin/Tissue Integrity - Adult  Goal: Skin integrity remains intact  Outcome: Progressing  Flowsheets (Taken 1/19/2025 2002)  Skin Integrity Remains Intact:   Monitor for areas of redness and/or skin breakdown   Assess vascular access sites hourly     Problem: Metabolic/Fluid and Electrolytes - Adult  Goal: Electrolytes maintained within normal limits  Outcome: Progressing     Problem: Safety - Medical Restraint  Goal: Remains free of injury from restraints (Restraint for Interference with Medical Device)  Description: INTERVENTIONS:  1. Determine that other, less restrictive measures have been tried or would not be effective before applying the restraint  2. Evaluate the patient's condition at the time of restraint application  3. Inform patient/family regarding the reason for restraint  4. Q2H: Monitor safety, psychosocial status, comfort, nutrition and hydration  Outcome: Progressing  Flowsheets  Taken 1/19/2025 1800 by Tasha Garcia RN  Remains free of injury from restraints (restraint for interference with medical device): Every 2 hours: Monitor safety, psychosocial status, comfort, nutrition and hydration  Taken 1/19/2025 1600 by Tasha Garcia RN  Remains free of injury from restraints (restraint for interference with medical device): Every 2 hours: Monitor safety, psychosocial status, comfort, nutrition and hydration  Taken 1/19/2025 1400 by Tasha Garcia RN  Remains free of injury from restraints (restraint for interference with medical device): Every 2 hours: Monitor safety, psychosocial status, comfort, nutrition and hydration  Taken 1/19/2025 1316 by Tasha Garcia RN  Remains free of injury from restraints (restraint for interference with medical device): Every 2 hours: Monitor safety, psychosocial status, comfort, nutrition and hydration     
  Problem: Respiratory - Adult  Goal: Achieves optimal ventilation and oxygenation  Outcome: Progressing  Flowsheets (Taken 1/25/2025 0400)  Achieves optimal ventilation and oxygenation:   Assess for changes in respiratory status   Assess for changes in mentation and behavior   Position to facilitate oxygenation and minimize respiratory effort   Oxygen supplementation based on oxygen saturation or arterial blood gases     Problem: Metabolic/Fluid and Electrolytes - Adult  Goal: Electrolytes maintained within normal limits  Outcome: Progressing  Flowsheets  Taken 1/25/2025 0400 by Amish Araiza RN  Electrolytes maintained within normal limits: Monitor labs and assess patient for signs and symptoms of electrolyte imbalances  Taken 1/24/2025 2000 by Leila Javier RN  Electrolytes maintained within normal limits:   Monitor labs and assess patient for signs and symptoms of electrolyte imbalances   Administer electrolyte replacement as ordered   Monitor response to electrolyte replacements, including repeat lab results as appropriate   Fluid restriction as ordered   Instruct patient on fluid and nutrition restrictions as appropriate  Goal: Hemodynamic stability and optimal renal function maintained  Outcome: Progressing  Flowsheets  Taken 1/25/2025 0400 by Amish Araiza RN  Hemodynamic stability and optimal renal function maintained:   Monitor labs and assess for signs and symptoms of volume excess or deficit   Monitor intake, output and patient weight  Taken 1/24/2025 2000 by Leila Javier RN  Hemodynamic stability and optimal renal function maintained:   Monitor labs and assess for signs and symptoms of volume excess or deficit   Monitor intake, output and patient weight   Monitor urine specific gravity, serum osmolarity and serum sodium as indicated or ordered   Monitor response to interventions for patient's volume status, including labs, urine output, blood pressure (other measures as available)   Encourage oral 
  Problem: Respiratory - Adult  Goal: Achieves optimal ventilation and oxygenation  Outcome: Progressing  Flowsheets (Taken 1/25/2025 2000)  Achieves optimal ventilation and oxygenation:   Assess for changes in respiratory status   Assess for changes in mentation and behavior   Position to facilitate oxygenation and minimize respiratory effort   Oxygen supplementation based on oxygen saturation or arterial blood gases     Problem: Skin/Tissue Integrity - Adult  Goal: Skin integrity remains intact  Outcome: Progressing  Flowsheets (Taken 1/25/2025 2000)  Skin Integrity Remains Intact:   Monitor for areas of redness and/or skin breakdown   Assess vascular access sites hourly   Every 4-6 hours minimum: Change oxygen saturation probe site   Every 4-6 hours: If on nasal continuous positive airway pressure, respiratory therapy assesses nares and determine need for appliance change or resting period  Goal: Oral mucous membranes remain intact  Outcome: Progressing     
  Problem: Skin/Tissue Integrity - Adult  Goal: Skin integrity remains intact  Outcome: Not Progressing  Flowsheets (Taken 1/17/2025 2100)  Skin Integrity Remains Intact:   Monitor for areas of redness and/or skin breakdown   Assess vascular access sites hourly   Every 4-6 hours: If on nasal continuous positive airway pressure, respiratory therapy assesses nares and determine need for appliance change or resting period   Every 4-6 hours minimum: Change oxygen saturation probe site   Pt bleeding from area on R wrist Pt has multiple ecchymotic areas on arms, legs Skin is extremely fragile.   Problem: Nutrition Deficit:  Goal: Optimize nutritional status  Recent Flowsheet Documentation  Taken 1/17/2025 1306 by Kailyn Malcolm RD, LD  Nutrient intake appropriate for improving, restoring, or maintaining nutritional needs:   Assess nutritional status and recommend course of action   Recommend, monitor, and adjust tube feedings and TPN/PPN based on assessed needs   Pt tolerating tube feeding and has been at goal for 24 hours.  
HEART FAILURE CARE PLAN:    Comorbidities Reviewed: Yes   Patient has a past medical history of NADJA (acute kidney injury) ([facility]), Asthma, Atrial fibrillation with RVR ([facility]), CAD (coronary artery disease), CHF (congestive heart failure) ([facility]), Chronic anxiety, COPD (chronic obstructive pulmonary disease) ([facility]), COPD (chronic obstructive pulmonary disease) ([facility]), GERD (gastroesophageal reflux disease), Heart attack ([facility]), History of blood transfusion, Hyperlipidemia, Hypertension, MRSA (methicillin resistant staph aureus) culture positive, OA (osteoarthritis), and Thyroid disease.     Weights Reviewed: Yes   Admission weight: 47.2 kg (104 lb)   Wt Readings from Last 3 Encounters:   01/11/25 49.9 kg (109 lb 14.4 oz)   12/21/24 47.4 kg (104 lb 8 oz)   12/03/24 45.5 kg (100 lb 5 oz)     Intake & Output Reviewed: Yes     Intake/Output Summary (Last 24 hours) at 1/14/2025 0444  Last data filed at 1/13/2025 2301  Gross per 24 hour   Intake 1168.86 ml   Output --   Net 1168.86 ml       ECHOCARDIOGRAM Reviewed: Yes   Patient's Ejection Fraction (EF) is greater than 40%     Medications Reviewed: Yes   SCHEDULED HOSPITAL MEDICATIONS:   pantoprazole (PROTONIX) 40 mg in sodium chloride (PF) 0.9 % 10 mL injection  40 mg IntraVENous BID    predniSONE  20 mg Oral Daily    busPIRone  7.5 mg Oral BID    piperacillin-tazobactam  3,375 mg IntraVENous Q8H    mupirocin   Each Nostril BID    miconazole nitrate   Topical BID    ipratropium 0.5 mg-albuterol 2.5 mg  1 Dose Inhalation 4x Daily RT    sodium chloride flush  5-40 mL IntraVENous 2 times per day    atorvastatin  20 mg Oral Nightly    budesonide  0.5 mg Nebulization BID RT    docusate sodium  100 mg Oral Daily    [Held by provider] apixaban  2.5 mg Oral BID    escitalopram  10 mg Oral Daily    [Held by provider] gabapentin  100 mg Oral TID    levothyroxine  25 mcg Oral Daily    magnesium oxide  400 mg Oral Daily    metoprolol tartrate  12.5 mg Oral BID     HOME MEDICATIONS:  Prior to 
HEART FAILURE CARE PLAN:    Comorbidities Reviewed: Yes   Patient has a past medical history of NADJA (acute kidney injury) ([facility]), Asthma, Atrial fibrillation with RVR ([facility]), CAD (coronary artery disease), CHF (congestive heart failure) ([facility]), Chronic anxiety, COPD (chronic obstructive pulmonary disease) ([facility]), COPD (chronic obstructive pulmonary disease) ([facility]), GERD (gastroesophageal reflux disease), Heart attack ([facility]), History of blood transfusion, Hyperlipidemia, Hypertension, MRSA (methicillin resistant staph aureus) culture positive, OA (osteoarthritis), and Thyroid disease.     Weights Reviewed: Yes   Admission weight: 47.2 kg (104 lb)   Wt Readings from Last 3 Encounters:   01/11/25 49.9 kg (109 lb 14.4 oz)   12/21/24 47.4 kg (104 lb 8 oz)   12/03/24 45.5 kg (100 lb 5 oz)     Intake & Output Reviewed: Yes     Intake/Output Summary (Last 24 hours) at 1/16/2025 0150  Last data filed at 1/15/2025 1620  Gross per 24 hour   Intake 712.09 ml   Output 800 ml   Net -87.91 ml       ECHOCARDIOGRAM Reviewed: Yes   Patient's Ejection Fraction (EF) is greater than 40%     Medications Reviewed: Yes   SCHEDULED HOSPITAL MEDICATIONS:   methylPREDNISolone  40 mg IntraVENous Q12H    pantoprazole (PROTONIX) 40 mg in sodium chloride (PF) 0.9 % 10 mL injection  40 mg IntraVENous BID    busPIRone  7.5 mg Oral BID    piperacillin-tazobactam  3,375 mg IntraVENous Q8H    miconazole nitrate   Topical BID    ipratropium 0.5 mg-albuterol 2.5 mg  1 Dose Inhalation 4x Daily RT    sodium chloride flush  5-40 mL IntraVENous 2 times per day    atorvastatin  20 mg Oral Nightly    budesonide  0.5 mg Nebulization BID RT    docusate sodium  100 mg Oral Daily    [Held by provider] apixaban  2.5 mg Oral BID    escitalopram  10 mg Oral Daily    [Held by provider] gabapentin  100 mg Oral TID    levothyroxine  25 mcg Oral Daily    magnesium oxide  400 mg Oral Daily    metoprolol tartrate  12.5 mg Oral BID     HOME MEDICATIONS:  Prior to Admission 
HEART FAILURE CARE PLAN:    Comorbidities Reviewed: Yes   Patient has a past medical history of NADJA (acute kidney injury) ([facility]), Asthma, Atrial fibrillation with RVR ([facility]), CAD (coronary artery disease), CHF (congestive heart failure) ([facility]), Chronic anxiety, COPD (chronic obstructive pulmonary disease) ([facility]), COPD (chronic obstructive pulmonary disease) ([facility]), GERD (gastroesophageal reflux disease), Heart attack ([facility]), History of blood transfusion, Hyperlipidemia, Hypertension, MRSA (methicillin resistant staph aureus) culture positive, OA (osteoarthritis), and Thyroid disease.     Weights Reviewed: Yes   Admission weight: 47.2 kg (104 lb)   Wt Readings from Last 3 Encounters:   01/21/25 45.4 kg (100 lb)   12/21/24 47.4 kg (104 lb 8 oz)   12/03/24 45.5 kg (100 lb 5 oz)     Intake & Output Reviewed: Yes     Intake/Output Summary (Last 24 hours) at 1/24/2025 0520  Last data filed at 1/23/2025 1941  Gross per 24 hour   Intake 1511.58 ml   Output 825 ml   Net 686.58 ml       ECHOCARDIOGRAM Reviewed: Yes   Patient's Ejection Fraction (EF) is greater than 40%     Medications Reviewed: Yes   SCHEDULED HOSPITAL MEDICATIONS:   predniSONE  30 mg Oral Daily    sodium chloride  500 mL IntraVENous Once    cefepime  1,000 mg IntraVENous Q12H    sodium phosphate IVPB (CENTRAL line)  30 mmol IntraVENous Once    metroNIDAZOLE  500 mg IntraVENous Q8H    lidocaine  1 patch TransDERmal Daily    pantoprazole (PROTONIX) 40 mg in sodium chloride (PF) 0.9 % 10 mL injection  40 mg IntraVENous BID    busPIRone  7.5 mg Oral BID    miconazole nitrate   Topical BID    ipratropium 0.5 mg-albuterol 2.5 mg  1 Dose Inhalation 4x Daily RT    sodium chloride flush  5-40 mL IntraVENous 2 times per day    atorvastatin  20 mg Oral Nightly    budesonide  0.5 mg Nebulization BID RT    docusate sodium  100 mg Oral Daily    [Held by provider] apixaban  2.5 mg Oral BID    escitalopram  10 mg Oral Daily    [Held by provider] gabapentin  100 mg Oral TID    
HEART FAILURE CARE PLAN:    Comorbidities Reviewed: Yes   Patient has a past medical history of NADJA (acute kidney injury) ([facility]), Asthma, Atrial fibrillation with RVR ([facility]), CAD (coronary artery disease), CHF (congestive heart failure) ([facility]), Chronic anxiety, COPD (chronic obstructive pulmonary disease) ([facility]), COPD (chronic obstructive pulmonary disease) ([facility]), GERD (gastroesophageal reflux disease), Heart attack ([facility]), History of blood transfusion, Hyperlipidemia, Hypertension, MRSA (methicillin resistant staph aureus) culture positive, OA (osteoarthritis), and Thyroid disease.     Weights Reviewed: Yes   Admission weight: 47.2 kg (104 lb)   Wt Readings from Last 3 Encounters:   01/21/25 45.4 kg (100 lb)   12/21/24 47.4 kg (104 lb 8 oz)   12/03/24 45.5 kg (100 lb 5 oz)     Intake & Output Reviewed: Yes     Intake/Output Summary (Last 24 hours) at 1/23/2025 0407  Last data filed at 1/23/2025 0205  Gross per 24 hour   Intake 3074.22 ml   Output 386 ml   Net 2688.22 ml       ECHOCARDIOGRAM Reviewed: Yes   Patient's Ejection Fraction (EF) is greater than 40%     Medications Reviewed: Yes   SCHEDULED HOSPITAL MEDICATIONS:   predniSONE  30 mg Oral Daily    sodium chloride  500 mL IntraVENous Once    cefepime  1,000 mg IntraVENous Q12H    sodium phosphate IVPB (CENTRAL line)  30 mmol IntraVENous Once    metroNIDAZOLE  500 mg IntraVENous Q8H    lidocaine  1 patch TransDERmal Daily    pantoprazole (PROTONIX) 40 mg in sodium chloride (PF) 0.9 % 10 mL injection  40 mg IntraVENous BID    busPIRone  7.5 mg Oral BID    miconazole nitrate   Topical BID    ipratropium 0.5 mg-albuterol 2.5 mg  1 Dose Inhalation 4x Daily RT    sodium chloride flush  5-40 mL IntraVENous 2 times per day    atorvastatin  20 mg Oral Nightly    budesonide  0.5 mg Nebulization BID RT    docusate sodium  100 mg Oral Daily    [Held by provider] apixaban  2.5 mg Oral BID    escitalopram  10 mg Oral Daily    [Held by provider] gabapentin  100 mg Oral TID    
Patient provided a COPD Educational Folder that includes the following materials:     [x]  [employer] Booklet: Managing your COPD  [x]  ALA: Getting the Most Out of Medication Delivery Devices  [x]  ALA: My COPD Action Plan  [x]  [employer]: [facility]   [x]  Smoking Cessation Classes  [x]  Outpatient Spiritual Care Services  [x]  [employer]: Signs of COPD    PATIENT/CAREGIVER TEACHING:   Level of patient/caregiver understanding able to:   [x] Verbalize understanding   [] Demonstrate understanding       [] Teach back        [] Needs reinforcement     []  Other:     Electronically signed by Emily Menendez RN on 1/23/2025 at 4:07 AM   
Patient provided a COPD Educational Folder that includes the following materials:     [x]  [employer] Booklet: Managing your COPD  [x]  ALA: Getting the Most Out of Medication Delivery Devices  [x]  ALA: My COPD Action Plan  [x]  [employer] [facility]: [facility]   [x]  Smoking Cessation Classes  [x]  Outpatient Spiritual Care Services  [x]  Magnet: Signs of COPD    PATIENT/CAREGIVER TEACHING:   Level of patient/caregiver understanding able to:   [x] Verbalize understanding   [] Demonstrate understanding       [] Teach back        [] Needs reinforcement     []  Other:     Electronically signed by Emily Menendez RN on 1/14/2025 at 4:45 AM   
Patient provided a COPD Educational Folder that includes the following materials:     [x]  [employer] Booklet: Managing your COPD  [x]  ALA: Getting the Most Out of Medication Delivery Devices  [x]  ALA: My COPD Action Plan  [x]  [employer] [facility]: [facility]   [x]  Smoking Cessation Classes  [x]  Outpatient Spiritual Care Services  [x]  Magnet: Signs of COPD    PATIENT/CAREGIVER TEACHING:   Level of patient/caregiver understanding able to:   [x] Verbalize understanding   [] Demonstrate understanding       [] Teach back        [] Needs reinforcement     []  Other:     Electronically signed by Emily Menendez RN on 1/16/2025 at 1:51 AM   
Patient provided a COPD Educational Folder that includes the following materials:     [x]  [place] Booklet: Managing your COPD  [x]  ALA: Getting the Most Out of Medication Delivery Devices  [x]  ALA: My COPD Action Plan  [x]  [employer] [facility]: [facility]   [x]  Smoking Cessation Classes  [x]  Outpatient Spiritual Care Services  [x]  Magnet: Signs of COPD    PATIENT/CAREGIVER TEACHING:   Level of patient/caregiver understanding able to:   [x] Verbalize understanding   [] Demonstrate understanding       [] Teach back        [] Needs reinforcement     []  Other:     Electronically signed by Emily Menendez RN on 1/24/2025 at 5:19 AM   
SLP completed evaluation. Please refer to notes in EMR.      Thank you,   Jeri Fallon M.A. CCC-SLP   Speech-Language Pathologist    
Electrolytes - Adult  Goal: Electrolytes maintained within normal limits  Outcome: Progressing  Flowsheets  Taken 1/18/2025 0830 by Tasha Garcia RN  Electrolytes maintained within normal limits: Monitor labs and assess patient for signs and symptoms of electrolyte imbalances  Taken 1/17/2025 2100 by Leila Javier RN  Electrolytes maintained within normal limits:   Monitor labs and assess patient for signs and symptoms of electrolyte imbalances   Administer electrolyte replacement as ordered   Monitor response to electrolyte replacements, including repeat lab results as appropriate   Fluid restriction as ordered   Instruct patient on fluid and nutrition restrictions as appropriate  Goal: Hemodynamic stability and optimal renal function maintained  Outcome: Progressing  Flowsheets  Taken 1/18/2025 0830 by Tasha Garcia RN  Hemodynamic stability and optimal renal function maintained: Monitor labs and assess for signs and symptoms of volume excess or deficit  Taken 1/17/2025 2100 by Leila Javier RN  Hemodynamic stability and optimal renal function maintained:   Monitor labs and assess for signs and symptoms of volume excess or deficit   Monitor intake, output and patient weight   Monitor urine specific gravity, serum osmolarity and serum sodium as indicated or ordered   Monitor response to interventions for patient's volume status, including labs, urine output, blood pressure (other measures as available)   Encourage oral intake as appropriate   Instruct patient on fluid and nutrition restrictions as appropriate  Goal: Glucose maintained within prescribed range  Outcome: Progressing  Flowsheets  Taken 1/18/2025 0830 by Tasha Garcia RN  Glucose maintained within prescribed range: Monitor blood glucose as ordered  Taken 1/17/2025 2100 by Leila Javier RN  Glucose maintained within prescribed range:   Monitor blood glucose as ordered   Assess for signs and symptoms of hyperglycemia and hypoglycemia   Administer

## 2025-01-27 ENCOUNTER — HOSPITAL ENCOUNTER (INPATIENT)
Age: 85
LOS: 2 days | DRG: 951 | End: 2025-01-29
Attending: INTERNAL MEDICINE | Admitting: INTERNAL MEDICINE
Payer: MEDICARE

## 2025-01-27 VITALS
WEIGHT: 100 LBS | DIASTOLIC BLOOD PRESSURE: 50 MMHG | HEIGHT: 64 IN | RESPIRATION RATE: 34 BRPM | TEMPERATURE: 100.4 F | HEART RATE: 102 BPM | SYSTOLIC BLOOD PRESSURE: 90 MMHG | BODY MASS INDEX: 17.07 KG/M2 | OXYGEN SATURATION: 98 %

## 2025-01-27 PROBLEM — I63.9 ACUTE CVA (CEREBROVASCULAR ACCIDENT) (HCC): Status: ACTIVE | Noted: 2025-01-27

## 2025-01-27 LAB
ABO + RH BLD: NORMAL
ACID FAST STN SPEC QL: NORMAL
ANION GAP SERPL CALCULATED.3IONS-SCNC: 12 MMOL/L (ref 3–16)
ANISOCYTOSIS BLD QL SMEAR: ABNORMAL
ANTIBODY SCREEN: NORMAL
BACTERIA SPEC RESP CULT: NORMAL
BASOPHILS # BLD: 0 K/UL (ref 0–0.2)
BASOPHILS NFR BLD: 0 %
BLOOD BANK DISPENSE STATUS: NORMAL
BLOOD BANK PRODUCT CODE: NORMAL
BPU ID: NORMAL
BUN SERPL-MCNC: 77 MG/DL (ref 7–20)
CALCIUM SERPL-MCNC: 7.5 MG/DL (ref 8.3–10.6)
CHLORIDE SERPL-SCNC: 109 MMOL/L (ref 99–110)
CO2 SERPL-SCNC: 17 MMOL/L (ref 21–32)
CREAT SERPL-MCNC: 2.5 MG/DL (ref 0.6–1.2)
DEPRECATED RDW RBC AUTO: 17.8 % (ref 12.4–15.4)
DESCRIPTION BLOOD BANK: NORMAL
EOSINOPHIL # BLD: 0 K/UL (ref 0–0.6)
EOSINOPHIL NFR BLD: 0 %
GFR SERPLBLD CREATININE-BSD FMLA CKD-EPI: 19 ML/MIN/{1.73_M2}
GLUCOSE BLD-MCNC: 148 MG/DL (ref 70–99)
GLUCOSE BLD-MCNC: 194 MG/DL (ref 70–99)
GLUCOSE BLD-MCNC: 197 MG/DL (ref 70–99)
GLUCOSE BLD-MCNC: 226 MG/DL (ref 70–99)
GLUCOSE SERPL-MCNC: 121 MG/DL (ref 70–99)
GRAM STN SPEC: NORMAL
HCT VFR BLD AUTO: 24.3 % (ref 36–48)
HGB BLD-MCNC: 7.6 G/DL (ref 12–16)
LOEFFLER MB STN SPEC: NORMAL
LYMPHOCYTES # BLD: 0 K/UL (ref 1–5.1)
LYMPHOCYTES NFR BLD: 0 %
MCH RBC QN AUTO: 31.9 PG (ref 26–34)
MCHC RBC AUTO-ENTMCNC: 31.4 G/DL (ref 31–36)
MCV RBC AUTO: 101.8 FL (ref 80–100)
MONOCYTES # BLD: 0.2 K/UL (ref 0–1.3)
MONOCYTES NFR BLD: 1 %
NEUTROPHILS # BLD: 18.6 K/UL (ref 1.7–7.7)
NEUTROPHILS NFR BLD: 99 %
PERFORMED ON: ABNORMAL
PLATELET # BLD AUTO: 101 K/UL (ref 135–450)
PLATELET BLD QL SMEAR: ABNORMAL
PMV BLD AUTO: 10.4 FL (ref 5–10.5)
POIKILOCYTOSIS BLD QL SMEAR: ABNORMAL
POTASSIUM SERPL-SCNC: 4.2 MMOL/L (ref 3.5–5.1)
RBC # BLD AUTO: 2.39 M/UL (ref 4–5.2)
SLIDE REVIEW: ABNORMAL
SODIUM SERPL-SCNC: 138 MMOL/L (ref 136–145)
TROPONIN, HIGH SENSITIVITY: 324 NG/L (ref 0–14)
WBC # BLD AUTO: 18.8 K/UL (ref 4–11)

## 2025-01-27 PROCEDURE — 94640 AIRWAY INHALATION TREATMENT: CPT

## 2025-01-27 PROCEDURE — 86901 BLOOD TYPING SEROLOGIC RH(D): CPT

## 2025-01-27 PROCEDURE — 85025 COMPLETE CBC W/AUTO DIFF WBC: CPT

## 2025-01-27 PROCEDURE — 1250000000 HC SEMI PRIVATE HOSPICE R&B

## 2025-01-27 PROCEDURE — 2500000003 HC RX 250 WO HCPCS: Performed by: INTERNAL MEDICINE

## 2025-01-27 PROCEDURE — 6370000000 HC RX 637 (ALT 250 FOR IP): Performed by: INTERNAL MEDICINE

## 2025-01-27 PROCEDURE — 6360000002 HC RX W HCPCS: Performed by: INTERNAL MEDICINE

## 2025-01-27 PROCEDURE — 94660 CPAP INITIATION&MGMT: CPT

## 2025-01-27 PROCEDURE — 86923 COMPATIBILITY TEST ELECTRIC: CPT

## 2025-01-27 PROCEDURE — 36415 COLL VENOUS BLD VENIPUNCTURE: CPT

## 2025-01-27 PROCEDURE — P9047 ALBUMIN (HUMAN), 25%, 50ML: HCPCS | Performed by: INTERNAL MEDICINE

## 2025-01-27 PROCEDURE — 2700000000 HC OXYGEN THERAPY PER DAY

## 2025-01-27 PROCEDURE — 94761 N-INVAS EAR/PLS OXIMETRY MLT: CPT

## 2025-01-27 PROCEDURE — 99239 HOSP IP/OBS DSCHRG MGMT >30: CPT | Performed by: INTERNAL MEDICINE

## 2025-01-27 PROCEDURE — 80048 BASIC METABOLIC PNL TOTAL CA: CPT

## 2025-01-27 PROCEDURE — 84484 ASSAY OF TROPONIN QUANT: CPT

## 2025-01-27 PROCEDURE — 86850 RBC ANTIBODY SCREEN: CPT

## 2025-01-27 PROCEDURE — 86900 BLOOD TYPING SEROLOGIC ABO: CPT

## 2025-01-27 PROCEDURE — 2580000003 HC RX 258: Performed by: INTERNAL MEDICINE

## 2025-01-27 RX ORDER — GLYCOPYRROLATE 0.2 MG/ML
0.1 INJECTION INTRAMUSCULAR; INTRAVENOUS ONCE
Status: COMPLETED | OUTPATIENT
Start: 2025-01-27 | End: 2025-01-27

## 2025-01-27 RX ORDER — LORAZEPAM 2 MG/ML
1 INJECTION INTRAMUSCULAR EVERY 4 HOURS PRN
Status: CANCELLED | OUTPATIENT
Start: 2025-01-27

## 2025-01-27 RX ORDER — MORPHINE SULFATE 2 MG/ML
2 INJECTION, SOLUTION INTRAMUSCULAR; INTRAVENOUS
Status: DISCONTINUED | OUTPATIENT
Start: 2025-01-27 | End: 2025-01-27 | Stop reason: HOSPADM

## 2025-01-27 RX ORDER — SODIUM CHLORIDE 9 MG/ML
INJECTION, SOLUTION INTRAVENOUS PRN
Status: DISCONTINUED | OUTPATIENT
Start: 2025-01-27 | End: 2025-01-27

## 2025-01-27 RX ORDER — LORAZEPAM 2 MG/ML
1 INJECTION INTRAMUSCULAR EVERY 4 HOURS PRN
Status: DISCONTINUED | OUTPATIENT
Start: 2025-01-27 | End: 2025-01-29 | Stop reason: HOSPADM

## 2025-01-27 RX ORDER — MORPHINE SULFATE 2 MG/ML
2 INJECTION, SOLUTION INTRAMUSCULAR; INTRAVENOUS
Status: DISCONTINUED | OUTPATIENT
Start: 2025-01-27 | End: 2025-01-29 | Stop reason: HOSPADM

## 2025-01-27 RX ORDER — ATROPINE SULFATE 10 MG/ML
1 SOLUTION/ DROPS OPHTHALMIC 3 TIMES DAILY PRN
Status: DISCONTINUED | OUTPATIENT
Start: 2025-01-27 | End: 2025-01-29 | Stop reason: HOSPADM

## 2025-01-27 RX ORDER — ATROPINE SULFATE 10 MG/ML
1 SOLUTION/ DROPS OPHTHALMIC 3 TIMES DAILY PRN
Status: CANCELLED | OUTPATIENT
Start: 2025-01-27

## 2025-01-27 RX ORDER — LORAZEPAM 2 MG/ML
1 INJECTION INTRAMUSCULAR EVERY 4 HOURS PRN
Status: DISCONTINUED | OUTPATIENT
Start: 2025-01-27 | End: 2025-01-27 | Stop reason: HOSPADM

## 2025-01-27 RX ORDER — MORPHINE SULFATE 2 MG/ML
2 INJECTION, SOLUTION INTRAMUSCULAR; INTRAVENOUS
Status: CANCELLED | OUTPATIENT
Start: 2025-01-27

## 2025-01-27 RX ORDER — ATROPINE SULFATE 10 MG/ML
1 SOLUTION/ DROPS OPHTHALMIC 3 TIMES DAILY PRN
Status: DISCONTINUED | OUTPATIENT
Start: 2025-01-27 | End: 2025-01-27 | Stop reason: HOSPADM

## 2025-01-27 RX ADMIN — LORAZEPAM 1 MG: 2 INJECTION, SOLUTION INTRAMUSCULAR; INTRAVENOUS at 16:59

## 2025-01-27 RX ADMIN — IPRATROPIUM BROMIDE AND ALBUTEROL SULFATE 1 DOSE: 2.5; .5 SOLUTION RESPIRATORY (INHALATION) at 11:47

## 2025-01-27 RX ADMIN — BUDESONIDE 500 MCG: 0.5 INHALANT RESPIRATORY (INHALATION) at 07:29

## 2025-01-27 RX ADMIN — GLYCOPYRROLATE 0.1 MG: 0.2 INJECTION INTRAMUSCULAR; INTRAVENOUS at 06:44

## 2025-01-27 RX ADMIN — Medication 400 MG: at 08:39

## 2025-01-27 RX ADMIN — METRONIDAZOLE 500 MG: 500 INJECTION, SOLUTION INTRAVENOUS at 04:10

## 2025-01-27 RX ADMIN — IPRATROPIUM BROMIDE AND ALBUTEROL SULFATE 1 DOSE: 2.5; .5 SOLUTION RESPIRATORY (INHALATION) at 07:29

## 2025-01-27 RX ADMIN — SODIUM CHLORIDE, PRESERVATIVE FREE 10 ML: 5 INJECTION INTRAVENOUS at 08:39

## 2025-01-27 RX ADMIN — Medication 40 MG: at 08:39

## 2025-01-27 RX ADMIN — LORAZEPAM 1 MG: 2 INJECTION, SOLUTION INTRAMUSCULAR; INTRAVENOUS at 22:11

## 2025-01-27 RX ADMIN — BUSPIRONE HYDROCHLORIDE 7.5 MG: 7.5 TABLET ORAL at 08:39

## 2025-01-27 RX ADMIN — ALBUMIN (HUMAN) 25 G: 0.25 INJECTION, SOLUTION INTRAVENOUS at 05:26

## 2025-01-27 RX ADMIN — NOREPINEPHRINE BITARTRATE 5 MCG/MIN: 0.06 INJECTION, SOLUTION INTRAVENOUS at 01:14

## 2025-01-27 RX ADMIN — LEVOTHYROXINE SODIUM 25 MCG: 25 TABLET ORAL at 05:47

## 2025-01-27 RX ADMIN — PREDNISONE 20 MG: 20 TABLET ORAL at 08:39

## 2025-01-27 RX ADMIN — ESCITALOPRAM OXALATE 10 MG: 10 TABLET ORAL at 08:39

## 2025-01-27 RX ADMIN — MICONAZOLE NITRATE: 2 OINTMENT TOPICAL at 08:38

## 2025-01-27 RX ADMIN — DEXMEDETOMIDINE HYDROCHLORIDE 0.8 MCG/KG/HR: 400 INJECTION, SOLUTION INTRAVENOUS at 05:27

## 2025-01-27 NOTE — CONSULTS
[address] [address]                              CONSULTATION      PATIENT NAME: SHARON HANEY           : 1940  MED REC NO: [id number]                      ROOM: 3011  ACCOUNT NO: [id number]                       ADMIT DATE: 2025  PROVIDER: Wojciech Diaz MD      CONSULT DATE: 2025    REASON FOR CONSULTATION:  Hypernatremia.    HISTORY OF PRESENT ILLNESS:  The patient is an 84-year-old  female patient who presented to [facility] with abdominal pain, nausea, and vomiting.  The patient was noted to have choledocholithiasis and underwent ERCP with stone removal.  She was kept n.p.o. and slowly developed hypernatremia, which prompted Nephrology consultation.    PAST MEDICAL HISTORY:    1. Atrial fibrillation.  2. Asthma.  3. Coronary artery disease.  4. COPD.  5. Hyperlipidemia.  6. Hypertension.  7. Hypothyroidism.    PAST SURGICAL HISTORY:    1. Bronchoscopy.  2.  section.  3. Colonoscopy.  4. Coronary artery bypass graft.  5. Sigmoidoscopy.  6. EGD.  7. ERCP with stone removal.    ALLERGIES:  ALLERGIC TO CODEINE AND LATEX.      SOCIAL HISTORY:  The patient quit smoking many years ago and does not drink alcohol.    FAMILY HISTORY:  Significant for coronary artery disease and cerebrovascular accident.    REVIEW OF SYSTEMS:  The patient denies any fever, chills, cough, or expectorations.  Otherwise, a 10-point review of systems was relatively unremarkable.    PHYSICAL EXAMINATION:  VITAL SIGNS:  Blood pressure 113/54, heart rate 91, respirations 22, temperature 97.3 Fahrenheit, saturating 99% on 4 L nasal cannula.  GENERAL APPEARANCE:  The patient is alert and oriented x3, not in acute distress.  HEENT:  Eyes reveal normal conjunctivae.  Reactive pupils.  NECK:  Reveals midline trachea.  Nonpalpable thyroid.  LUNGS:  Clear to anterior auscultation bilaterally.  Nonlabored 
     [facility] GENERAL SURGERY      Department of General Surgery Consult    PATIENT NAME: Terri Smith   YOB: 1940    ADMISSION DATE: 1/3/2025  7:08 PM      TODAY'S DATE: 2025    Reason for Consult:  Cholecystitis    Requesting Physician:  Hospitalist    HISTORY OF PRESENT ILLNESS:              The patient is a 84 y.o. female who presents with tailbone pain for weeks.  She also complained of abdominal pain that was moderate.  She states she has had previous abdominal pian similar episodes.  She had associated nausea.  She feels better today.     Past Medical History:        Diagnosis Date    NADJA (acute kidney injury) (HCC)     Asthma     Atrial fibrillation with RVR (HCC)     CAD (coronary artery disease)     CHF (congestive heart failure) (HCC)     unsure    Chronic anxiety     COPD (chronic obstructive pulmonary disease) (HCC)     COPD (chronic obstructive pulmonary disease) (HCC) 2023    GERD (gastroesophageal reflux disease) 2021    Heart attack (HCC)     History of blood transfusion     Hyperlipidemia     Hypertension     MRSA (methicillin resistant staph aureus) culture positive 2019    + resp cx    OA (osteoarthritis) 2014    Thyroid disease        Past Surgical History:        Procedure Laterality Date    BRONCHOSCOPY  2019    Dr. Wheatley - w/BAL    CARDIAC CATHETERIZATION  2019    Dr. Palomares     SECTION      COLONOSCOPY N/A 2020    COLONOSCOPY DIAGNOSTIC performed by Rowdy Schmitz DO at [facility] ENDOSCOPY    COLONOSCOPY N/A 10/22/2021    COLONOSCOPY POLYPECTOMY SNARE/COLD BIOPSY performed by Celestine Lester MD at [facility] ENDOSCOPY    COLONOSCOPY N/A 2023    COLONOSCOPY POLYPECTOMY SNARE performed by Darron Langford MD at [facility] ENDOSCOPY    COLONOSCOPY N/A 2024    COLONOSCOPY POLYPECTOMY SNARE/BIOPSY performed by Calvin Lopez MD at [facility] ENDOSCOPY    CORONARY ARTERY BYPASS GRAFT N/A 2019    
  Pharmacy to Manage Heparin Infusion per Hospital Nomogram    Dx: A-fib  Pt wt = 46.6 kg (actual weight)  Baseline Anti-Xa ordered.    Heparin low dose weight based infusion is ordered for patient. Per chart review, patient has not received an oral Factor Xa inhibitor within the last 72 hours. As oral Factor Xa inhibitors can impact the anti-Xa test calibrated to unfractionated heparin, Heparin infusion will be monitored and adjusted using anti-Xa per [facility] Policy.    Last Apixaban dose on 1/12/2025.      Heparin (weight-based) Infusion: CAD/STEMI/NSTEMI/AFIB  Heparin 60units/kg IVP bolus (max 4000 units) followed by Heparin infusion at 12 units/kg/hr (recommended max initial rate: 1000units/hr).  Check  anti-Xa  in 6 hours.   anti-Xa < 0.1            Heparin 60 units/kg bolus (max 4,000 units)    Increase infusion by 4 units/kg/hr  anti-Xa 0.1-0.29       Heparin 30 units/kg bolus (max 2,000 units)    Increase infusion by 2 units/kg/hr  anti-Xa 0.3-0.7         No bolus                                                           No change   anti-Xa 0.71-0.8       No bolus                                                           Decrease infusion by 1 units/kg/hr  anti-Xa 0.81-0.99     No bolus                                                           Decrease infusion by 2 units/kg/hr  anti-Xa 1 or more     Hold heparin for 1 hour                                    Decrease infusion by 3 units/kg/hr    When Anti-Xa  is within target range for two consecutive times, check Anti-Xa once daily with morning labs.       MARLENE RothPh.1/16/202510:30 AM    
Inpatient Neurology Consult  [facility] Neu[facility]  Mino Sumner MD    Terri Smith  1940    Date of Service: 1/20/2025    Referring Physician: Moon Sena MD    Reason for the consult and CC: abnormal head CT    HPI:   Terri Smith is a 84 y.o. female who  has a past medical history of NADJA (acute kidney injury) (HCC), Asthma, Atrial fibrillation with RVR (HCC), CAD (coronary artery disease), CHF (congestive heart failure) (HCC), Chronic anxiety, COPD (chronic obstructive pulmonary disease) (HCC), COPD (chronic obstructive pulmonary disease) (HCC), GERD (gastroesophageal reflux disease), Heart attack (HCC), History of blood transfusion, Hyperlipidemia, Hypertension, MRSA (methicillin resistant staph aureus) culture positive, OA (osteoarthritis), and Thyroid disease. Admitted for ERCP and COPD exacerbation on 1/7. Hospital course complicated by NADJA on CKD, acute blood loss anemia, retroperitoneal hematoma, new pulmonary nodules. Head CT revealed acute to subacute cortical infarct left occipital lobe on 1/19.     She is intubated unable to provide history. Son and daughter are at bedside. 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 She did no complain of right sided vision loss prior to admission. She was taking Eliquis as prescribed prior to admission.     Recently discharged for choledocholithiasis with CBD dilation. Notable history of afib, secondary hypercoagulable state, CAD s/p CABG, CHF, and GI bleed. Seen by inpatient neurology consult 12/2023 for acute vestibular syndrome work up revealed cerebral atrophy. Suspected central vertigo with overlying peripheral vertigo may need VNG outpatient. Brain MRI was ordered but could not be completed.     I personally reviewed and updated social history, past medical history, medications, allergy, surgical history, and family history as documented in the patient's electronic health 
Nephro has been seeing this patient since 1/11/2024  
PC consult completed yesterday. See notes from KYLE Rodas  
Palliative Care Chart Review  and Check in Note:     NAME:  Terri Smith  Admit Date: 1/3/2025  Hospital Day:  Hospital Day: 25   Current Code status: DNR-CC    Palliative care is continuing to following Ms. Smith for symptom management,  and goals of care discussion as needed. Patient's chart reviewed today 1/27/25.        The following are the currently established goals/code status, and Symptom management.     Goals of care: Writer met with pt's daughter,Juan, at bedside to continue with goals of care conversation. Hospice list provided to the family. Juan requests referral to [facility] at this time and fs faxed to [facility].    Code status: DNR-CC    Discharge plan: Writer spoke with Elsie from [facility] r/t referral and awaiting meeting time.     Message sent to ADDISON Echavarria related to above plan.      Chrissy Ayers RN  01/27/25  10:16 AM    
Patient seen and examined, consult note dictated.     Assessment and Plan:     1- Hypernatremia: Multifactorial and likely secondary to dehydration in the setting of decreased oral intake, along with Lasix. Serum sodium trending down nicely with hypotonic fluids.  - Continue maintenance IV fluids.  - Monitor serial labs to avoid overcorrection.    2- Choledocholithiasis s/p ERCP with stone removal.    
Pulmonary & Critical Care Consultation Note    Patient is being seen at the request of CLIFTON Ramirez CNP for a consultation for pulmonary nodules    HISTORY OF PRESENT ILLNESS:   84 years old admitted with vomiting.  CT abdomen with cuts through lower lungs 1/3/2025 showed right lower lobe 4 mm nodules, new from prior CT. Currently on 4L. AVAPS last night with desaturation, feeling too strong. SOB with minimal exertion. Cough with clear sputum. No hemoptysis.       PAST MEDICAL HISTORY:  Past Medical History:   Diagnosis Date    NADJA (acute kidney injury) (HCC)     Asthma     Atrial fibrillation with RVR (HCC)     CAD (coronary artery disease)     CHF (congestive heart failure) (HCC)     unsure    Chronic anxiety     COPD (chronic obstructive pulmonary disease) (HCC)     COPD (chronic obstructive pulmonary disease) (HCC) 2023    GERD (gastroesophageal reflux disease) 2021    Heart attack (HCC)     History of blood transfusion     Hyperlipidemia     Hypertension     MRSA (methicillin resistant staph aureus) culture positive 2019    + resp cx    OA (osteoarthritis) 2014    Thyroid disease      PAST SURGICAL HISTORY:  Past Surgical History:   Procedure Laterality Date    BRONCHOSCOPY  2019    Dr. Wheatley - w/BAL    CARDIAC CATHETERIZATION  2019    Dr. Palomares     SECTION      COLONOSCOPY N/A 2020    COLONOSCOPY DIAGNOSTIC performed by Rowdy Schmitz DO at [facility] ENDOSCOPY    COLONOSCOPY N/A 10/22/2021    COLONOSCOPY POLYPECTOMY SNARE/COLD BIOPSY performed by Celestine Lester MD at [facility] ENDOSCOPY    COLONOSCOPY N/A 2023    COLONOSCOPY POLYPECTOMY SNARE performed by Darron Langford MD at [facility] ENDOSCOPY    COLONOSCOPY N/A 2024    COLONOSCOPY POLYPECTOMY SNARE/BIOPSY performed by Calvin Lopez MD at [facility] ENDOSCOPY    CORONARY ARTERY BYPASS GRAFT N/A 2019    OFF PUMP CORONARY ARTERY BYPASS GRAFTING X2, INTERNAL MAMMARY 
BOOSTRIX (age 10y+), IM, 0.5mL 2018    Tst, Unspecified Formulation 2021, 2021       Family history, past medical history, and  social  history  are  reviewed as  below.  Past Medical  History:  Past Medical History:   Diagnosis Date    NADJA (acute kidney injury) (HCC)     Asthma     Atrial fibrillation with RVR (HCC)     CAD (coronary artery disease)     CHF (congestive heart failure) (HCC)     unsure    Chronic anxiety     COPD (chronic obstructive pulmonary disease) (HCC)     COPD (chronic obstructive pulmonary disease) (HCC) 2023    GERD (gastroesophageal reflux disease) 2021    Heart attack (HCC) 2019    History of blood transfusion     Hyperlipidemia     Hypertension     MRSA (methicillin resistant staph aureus) culture positive 2019    + resp cx    OA (osteoarthritis) 2014    Thyroid disease        Past  Surgical History:  Past Surgical History:   Procedure Laterality Date    BRONCHOSCOPY  2019    Dr. Wheatley - w/BAL    CARDIAC CATHETERIZATION  2019    Dr. Palomares     SECTION      COLONOSCOPY N/A 2020    COLONOSCOPY DIAGNOSTIC performed by Rowdy Schmitz DO at [facility] ENDOSCOPY    COLONOSCOPY N/A 10/22/2021    COLONOSCOPY POLYPECTOMY SNARE/COLD BIOPSY performed by Celestine Lester MD at [facility] ENDOSCOPY    COLONOSCOPY N/A 2023    COLONOSCOPY POLYPECTOMY SNARE performed by Darron Langford MD at [facility] ENDOSCOPY    COLONOSCOPY N/A 2024    COLONOSCOPY POLYPECTOMY SNARE/BIOPSY performed by Calvin Lopez MD at [facility] ENDOSCOPY    CORONARY ARTERY BYPASS GRAFT N/A 2019    OFF PUMP CORONARY ARTERY BYPASS GRAFTING X2, INTERNAL MAMMARY ARTERY, SAPHENOUS VEIN GRAFT performed by Yolanda Chase MD at [facility] CVOR    IR MIDLINE CATH  2021    IR MIDLINE CATH 2021 [facility] SPECIAL PROCEDURES    MASTECTOMY, PARTIAL Left     SIGMOIDOSCOPY  2020    4 bands    SIGMOIDOSCOPY N/A 2020    FLEX SIG W/ BANDING 
  acetaminophen (TYLENOL) 500 MG tablet Take 1 tablet by mouth every 6 hours as needed for Pain    Provider, MD Charmaine        Allergies:  Latex and Codeine    Social History:   TOBACCO:   reports that she quit smoking about 5 years ago. Her smoking use included cigarettes. She started smoking about 55 years ago. She has a 50 pack-year smoking history. She has been exposed to tobacco smoke. She has never used smokeless tobacco.  ETOH:   reports no history of alcohol use.  DRUGS:   reports no history of drug use.      Family History:        Problem Relation Age of Onset    Cancer Mother     Heart Disease Father     Stroke Father     Heart Disease Sister     Stroke Sister        REVIEW OF SYSTEMS:  Unable to obtain due to patient's intubated status      PHYSICAL EXAM:  VITALS:  BP (!) 163/148   Pulse (!) 118   Temp 100.2 °F (37.9 °C) (Bladder)   Resp (!) 31   Ht 1.626 m (5' 4.02\")   Wt 45.7 kg (100 lb 12.8 oz)   SpO2 95%   BMI 17.29 kg/m²   24HR INTAKE/OUTPUT:    I/O last 3 completed shifts:  In: 3446.3 [I.V.:539.8; Blood:909.1; NG/GT:1075; IV Piggyback:922.5]  Out: 44 [Urine:44]  No intake/output data recorded.      CONSTITUTIONAL: Intubated, sedated and thin  EYES:  PERRL, sclera clear  ENT:  Normocephalic,atraumatic, without obvious abnormality  NECK:  supple, symmetrical, trachea midline  LUNGS: Resp effort easy and unlabored on vent  CARDIOVASCULAR:  NO JVD, tachycardic with regular rhythm and no murmur noted  ABDOMEN:  normal bowel sounds, soft, non-distended, no pain response to palpation, voluntary guarding absent, no masses palpated, however, there is a fullness in the left abdomen  MUSCULOSKELETAL: No clubbing or cyanosis, 1+ pitting edema lower extremities  SKIN: Cool and dry    DATA:    CBC:   Recent Labs     01/19/25  0945 01/19/25  1638 01/20/25  0555   WBC 21.0* 44.0* 38.3*   HGB 4.4* 12.8 10.3*   HCT 15.3* 39.7 30.7*    98* 126*     BMP:    Recent Labs     01/19/25  0640

## 2025-01-27 NOTE — PROGRESS NOTES
[facility] GENERAL SURGERY DAILY PROGRESS NOTE    SUBJECTIVE: Awake, alert    OBJECTIVE: CURRENT VITALS:  BP (!) 140/83   Pulse 88   Temp 97.5 °F (36.4 °C) (Oral)   Resp 18   Ht 1.626 m (5' 4.02\")   Wt 47.2 kg (104 lb)   SpO2 98%   BMI 17.84 kg/m²          ABD: Soft.  Non tender.     LABS:    CBC:   Recent Labs     01/05/25  0627 01/06/25  0641 01/07/25  0552   WBC 8.0 6.1 4.6   RBC 2.88* 2.61* 2.71*   HGB 8.5* 7.9* 8.0*   HCT 27.3* 26.7* 25.7*   MCV 94.8 102.3* 94.8   RDW 15.6* 16.9* 16.2*    399 398     BMP:   Recent Labs     01/05/25  0627 01/06/25  0641 01/07/25  0552    142 146*   K 4.0 4.1 3.4*    102 104   CO2 32 29 34*   BUN 9 9 10   CREATININE 1.0 1.0 1.2     Recent Labs     01/07/25  0552   MG 1.71*        ASSESSMENT:   Choledocholithiasis  Cholecystitis        PLAN:   Antibiotics  GI planning ERCP   Cholecystectomy timing TBD once CBD cleared.         MALINA RALPH MD   
                                      [facility]AL SURGERY DAILY PROGRESS NOTE    SUBJECTIVE: Awake, alert    OBJECTIVE: CURRENT VITALS:  /74   Pulse 89   Temp 97.9 °F (36.6 °C) (Axillary)   Resp 20   Ht 1.626 m (5' 4\")   Wt 47.2 kg (104 lb)   SpO2 98%   BMI 17.85 kg/m²          ABD: Soft.  Non distended.  Non tender.     LABS:    CBC:   Recent Labs     01/04/25  0859 01/05/25  0627 01/06/25  0641   WBC 9.4 8.0 6.1   RBC 3.22* 2.88* 2.61*   HGB 9.6* 8.5* 7.9*   HCT 30.8* 27.3* 26.7*   MCV 95.9 94.8 102.3*   RDW 15.5* 15.6* 16.9*    428 399     BMP:   Recent Labs     01/04/25  0658 01/05/25  0627 01/06/25  0641    141 142   K 5.7* 4.0 4.1   CL 99 100 102   CO2 37* 32 29   BUN 13 9 9   CREATININE 1.2 1.0 1.0           ASSESSMENT:   Choledocholithiasis  Cholecystitis      PLAN:   Antibiotics  GI planning ERCP         MALINA RALPH MD   
                                     PROGRESS NOTE  S:84 yrs Patient  admitted on 1/3/2025 with Choledocholithiasis with acute cholecystitis [K80.42]  Cholecystitis [K81.9]  Dysphagia, unspecified type [R13.10] .    Patient has no complaints.  She has a weak voice.  NG tube in place.    Current Hospital [facility] Meds   predniSONE  20 mg Oral Daily    busPIRone  7.5 mg Oral BID    piperacillin-tazobactam  3,375 mg IntraVENous Q8H    mupirocin   Each Nostril BID    miconazole nitrate   Topical BID    guaiFENesin  600 mg Oral BID    ipratropium 0.5 mg-albuterol 2.5 mg  1 Dose Inhalation 4x Daily RT    sodium chloride flush  5-40 mL IntraVENous 2 times per day    atorvastatin  20 mg Oral Nightly    budesonide  0.5 mg Nebulization BID RT    docusate sodium  100 mg Oral Daily    apixaban  2.5 mg Oral BID    escitalopram  10 mg Oral Daily    [Held by provider] gabapentin  100 mg Oral TID    levothyroxine  25 mcg Oral Daily    magnesium oxide  400 mg Oral Daily    metoprolol tartrate  12.5 mg Oral BID    pantoprazole  40 mg Oral BID     Current Hospital IV Meds   dextrose 60 mL/hr at 01/12/25 1006    sodium chloride       Current Hospital PRN Meds  LORazepam, sodium chloride flush, sodium chloride, magnesium sulfate, ondansetron **OR** ondansetron, polyethylene glycol, acetaminophen **OR** acetaminophen, albuterol, fluticasone, midodrine, albuterol sulfate HFA    Exam:   Vitals:    01/12/25 1300   BP: 135/72   Pulse: 97   Resp: 24   Temp:    SpO2:      I/O last 3 completed shifts:  In: 928.1 [I.V.:468.4; IV Piggyback:459.7]  Out: 650 [Urine:650]  A medically necessary and appropriate physical exam was performed.     Labs:  CBC:   Recent Labs     01/10/25  0917 01/11/25  0549 01/12/25  0251   WBC 20.9* 19.8* 15.7*   HGB 8.0* 7.6* 7.1*   HCT 26.2* 25.0* 23.3*   MCV 95.0 95.3 94.8    300 266     BMP:   Recent Labs     01/11/25  0549 01/11/25  1751 01/12/25  0251    143 147*   K 3.9 3.7 3.9    110 111*   CO2 
                                     PROGRESS NOTE  S:84 yrs Patient  admitted on 1/3/2025 with Choledocholithiasis with acute cholecystitis [K80.42]  Cholecystitis [K81.9]  Dysphagia, unspecified type [R13.10] .    Patient is on the BiPAP mask and unable to provide history.  Per the family member she had aspiration yesterday after food.    Current Hospital [facility] Meds   potassium chloride  40 mEq Oral Daily    busPIRone  7.5 mg Oral BID    piperacillin-tazobactam  3,375 mg IntraVENous Q8H    mupirocin   Each Nostril BID    miconazole nitrate   Topical BID    guaiFENesin  600 mg Oral BID    ipratropium 0.5 mg-albuterol 2.5 mg  1 Dose Inhalation 4x Daily RT    predniSONE  40 mg Oral Daily    sodium chloride flush  5-40 mL IntraVENous 2 times per day    atorvastatin  20 mg Oral Nightly    budesonide  0.5 mg Nebulization BID RT    docusate sodium  100 mg Oral Daily    apixaban  2.5 mg Oral BID    escitalopram  10 mg Oral Daily    [Held by provider] furosemide  20 mg Oral Daily    [Held by provider] gabapentin  100 mg Oral TID    levothyroxine  25 mcg Oral Daily    magnesium oxide  400 mg Oral Daily    metoprolol tartrate  12.5 mg Oral BID    pantoprazole  40 mg Oral BID    Roflumilast  500 mcg Oral Daily     Current Hospital IV Meds   dextrose 5 % and 0.45 % NaCl 50 mL/hr at 01/11/25 1134    sodium chloride       Current Hospital PRN Meds  LORazepam, HYDROmorphone, sodium chloride flush, sodium chloride, magnesium sulfate, ondansetron **OR** ondansetron, polyethylene glycol, acetaminophen **OR** acetaminophen, albuterol, fluticasone, midodrine, albuterol sulfate HFA    Exam:   Vitals:    01/11/25 1235   BP:    Pulse:    Resp:    Temp: 97.3 °F (36.3 °C)   SpO2:      I/O last 3 completed shifts:  In: 3268.4 [I.V.:2981.8; IV Piggyback:286.6]  Out: -   A medically necessary and appropriate physical exam was performed.     Labs:  CBC:   Recent Labs     01/10/25  0917 01/11/25  0549   WBC 20.9* 19.8*   HGB 8.0* 7.6*   HCT 
                                     PROGRESS NOTE  S:84 yrs Patient  admitted on 1/3/2025 with Choledocholithiasis with acute cholecystitis [K80.42]  Cholecystitis [K81.9]  Dysphagia, unspecified type [R13.10] .  She was transferred to ICU for Bipap. She appears lethargic but denies pain.     Exam:   Vitals:    01/10/25 0733   BP:    Pulse:    Resp: 22   Temp:    SpO2: 100%   Generalized: lethargic, no acute distress  HEENT: sclera clear, anicteric  Neck: supple, trachea midline  Heart NSR  Abdomen: soft, NT, ND  Extremities: no edema    Medications: Reviewed    Assessment  85 yo female with pmx of COPD, CHF, A.fib, CKD, HTN, HLD, and CABG admitted for abdominal pain secondary to choledocholithiasis s/p ERCP w/ sphincterotomy. Hospital course c/b continued vomiting w/ aspiration event and worsening hypoxia requiring transfer to ICU for Bipap.      Plan  Continue supportive care  PPI BID  SLP following  Aspiration precautions   Continue antibiotics  General surgery w/ plan for outpatient CCY    CLIFTON Obando - CNP  8:45 AM 1/10/2025                        
                                     PROGRESS NOTE  S:84 yrs Patient  admitted on 1/3/2025 with Choledocholithiasis with acute cholecystitis [K80.42]  Cholecystitis [K81.9]  Dysphagia, unspecified type [R13.10] .  Today she appears comfortable. Tolerating TFs. No further aspiration noted today.   Exam:   Vitals:    01/17/25 1600   BP: (!) 122/50   Pulse: 97   Resp: (!) 35   Temp:    SpO2: 98%     General: moderate respiratory distress, off BiPAP  HEENT: Sclera clear, anicteric  Neck: supple, symmetrical, trachea midline  Heart: Irregularly irregular  Abdomen: ND, soft, NT  Extremities: no edema    Medications: Reviewed    Labs:  CBC:   Recent Labs     01/15/25  0116 01/15/25  0734 01/16/25  0045 01/16/25  0710 01/17/25  0550   WBC 9.8  --   --  4.7 6.6   HGB 9.4*   < > 8.4* 8.6*  8.5* 8.7*   HCT 29.3*   < > 25.9* 27.3*  27.4* 26.1*   MCV 89.8  --   --  92.4 88.3     --   --  204 224    < > = values in this interval not displayed.     BMP:   Recent Labs     01/15/25  1805 01/16/25  0710 01/17/25  0550   * 143 144   K 3.9 4.1 2.4*    103 96*   CO2 34* 30 40*   PHOS 1.4*  --  1.7*   BUN 14 17 19   CREATININE 1.0 1.0 1.1       Impression: 84-year-old female with history of HTN, HLD, COPD, (O2 dependence, A-fib, CHF, CAD status post CABG, remote GI bleed admitted with COPD exacerbation and choledocholithiasis s/p ERCP with stone extraction. Hospital course complicated by respiratory failure and aspiration.     Recommendation:  Continue supportive care  Continue tube feeds per NG tube  Speech therapy  PT/OT  Aspiration precautions with elevated head of bed during tube feeds  High risk for endoscopy and PEG tube at this time      Celestine Lester MD, MD  4:05 PM 1/17/2025                       
                                     PROGRESS NOTE  S:84 yrs Patient  admitted on 1/3/2025 with Choledocholithiasis with acute cholecystitis [K80.42]  Cholecystitis [K81.9]  Dysphagia, unspecified type [R13.10] .  Today she complains of difficulty breathing    Exam:   Vitals:    01/06/25 1059   BP:    Pulse:    Resp:    Temp:    SpO2: 98%     General: appears older than stated age and moderate distress  HEENT: Sclera clear, anicteric  Neck: supple, symmetrical, trachea midline  Heart: Irregularly irregular  Abdomen: ND, soft, NT  Extremities: no edema     Medications: Reviewed    Labs:  CBC:   Recent Labs     01/04/25  0859 01/05/25  0627 01/06/25  0641   WBC 9.4 8.0 6.1   HGB 9.6* 8.5* 7.9*   HCT 30.8* 27.3* 26.7*   MCV 95.9 94.8 102.3*    428 399     BMP:   Recent Labs     01/04/25  0658 01/05/25  0627 01/06/25  0641    141 142   K 5.7* 4.0 4.1   CL 99 100 102   CO2 37* 32 29   BUN 13 9 9   CREATININE 1.2 1.0 1.0     LIVER PROFILE:   Recent Labs     01/03/25  1940 01/04/25  0658 01/04/25  0859 01/05/25  0627 01/06/25  0641   AST 18   < > 22 21 17   ALT 6*   < > 6* 8* <5*   LIPASE 44.0  --   --   --   --    BILIDIR  --   --  <0.1 <0.1  --    BILITOT <0.2   < > <0.2 <0.2 <0.2   ALKPHOS 93   < > 95 86 74    < > = values in this interval not displayed.       Impression: 84-year-old female with history of HTN, HLD, COPD, (O2 dependence, A-fib, CHF, CAD status post CABG, remote GI bleed admitted with COPD exacerbation and choledocholithiasis.    Recommendation:  Continue supportive care  Monitor LFTs  COPD exacerbation management per pulmonary team  ERCP postponed till tomorrow due to inclement weather      Celestine Lester MD, MD  1:43 PM 1/6/2025                       
                                     PROGRESS NOTE  S:84 yrs Patient  admitted on 1/3/2025 with Choledocholithiasis with acute cholecystitis [K80.42]  Cholecystitis [K81.9]  Dysphagia, unspecified type [R13.10] .  Today she is noted to be anemic. No reports of bleeding. Tolerating TFs. Normal BMs reported per RN. Patient on BiPAP and somnolent    Exam:   Vitals:    01/19/25 1336   BP: (!) 99/55   Pulse: 70   Resp: 20   Temp: (!) 92.1 °F (33.4 °C)   SpO2: 100%     General: moderate respiratory distress, on BiPAP  HEENT: Sclera clear, anicteric  Neck: supple, symmetrical, trachea midline  Heart: Irregularly irregular  Abdomen: ND, soft, NT  Extremities: no edema    Medications: Reviewed    Labs:  CBC:   Recent Labs     01/17/25  0550 01/18/25  0540 01/19/25  0945   WBC 6.6 9.4 21.0*   HGB 8.7* 8.1* 4.4*   HCT 26.1* 25.3* 15.3*   MCV 88.3 91.4 95.7    192 196     BMP:   Recent Labs     01/17/25  2127 01/18/25  0540 01/19/25  0640   * 148* 141   K 3.5 3.6 4.3    103 99   CO2 37* 42* 37*   PHOS 3.6 3.2 1.9*   BUN 22* 22* 33*   CREATININE 1.0 1.1 1.3*         Impression: 84-year-old female with history of HTN, HLD, COPD, (O2 dependence, A-fib, CHF, CAD status post CABG, remote GI bleed admitted with COPD exacerbation and choledocholithiasis s/p ERCP with stone extraction. Hospital course complicated by respiratory failure, aspiration and now with new onset anemia     Recommendation:  Continue supportive care  Continue tube feeds per NG tube  PPI BID  PT/OT  Aspiration precautions with elevated head of bed during tube feeds  Check CTA abd/pelvis      Celestine Lester MD, MD  2:18 PM 1/19/2025                       
                                     PROGRESS NOTE  S:84 yrs Patient  admitted on 1/3/2025 with Choledocholithiasis with acute cholecystitis [K80.42]  Cholecystitis [K81.9]  Dysphagia, unspecified type [R13.10] .  Today she is on BiPAP. Tolerating TFs. Not tolerating oral diet.     Exam:   Vitals:    01/18/25 1000   BP: (!) 108/52   Pulse: 89   Resp: 18   Temp:    SpO2: 99%     General: moderate respiratory distress, on and off BiPAP  HEENT: Sclera clear, anicteric  Neck: supple, symmetrical, trachea midline  Heart: Irregularly irregular  Abdomen: ND, soft, NT  Extremities: no edema    Medications: Reviewed    Labs:  CBC:   Recent Labs     01/16/25  0710 01/17/25  0550 01/18/25  0540   WBC 4.7 6.6 9.4   HGB 8.6*  8.5* 8.7* 8.1*   HCT 27.3*  27.4* 26.1* 25.3*   MCV 92.4 88.3 91.4    224 192     BMP:   Recent Labs     01/17/25  0550 01/17/25  2127 01/18/25  0540    147* 148*   K 2.4* 3.5 3.6   CL 96* 101 103   CO2 40* 37* 42*   PHOS 1.7* 3.6 3.2   BUN 19 22* 22*   CREATININE 1.1 1.0 1.1     Impression: 84-year-old female with history of HTN, HLD, COPD, (O2 dependence, A-fib, CHF, CAD status post CABG, remote GI bleed admitted with COPD exacerbation and choledocholithiasis s/p ERCP with stone extraction. Hospital course complicated by respiratory failure and aspiration.      Recommendation:  Continue supportive care  Continue tube feeds per NG tube  Ongoing active speech therapy  PT/OT  Aspiration precautions with elevated head of bed during tube feeds  High risk for endoscopy and PEG tube at this time      Celestine Lester MD, MD  11:10 AM 1/18/2025                       
                                     PROGRESS NOTE  S:84 yrs Patient  admitted on 1/3/2025 with Choledocholithiasis with acute cholecystitis [K80.42]  Cholecystitis [K81.9]  Dysphagia, unspecified type [R13.10] .  Today she remains intubated and sedated. Passed SBT this AM. EGD with PEG rescheduled for tomorrow due to endo staffing     Exam:   Vitals:    01/21/25 1600   BP: (!) 101/47   Pulse: 84   Resp: 19   Temp:    SpO2: 99%     General: intubated and sedated  HEENT: Sclera clear, anicteric  Neck: supple, symmetrical, trachea midline  Heart: Irregularly irregular  Abdomen: ND, soft, NT  Extremities: no edema    Medications: Reviewed    Labs:  CBC:   Recent Labs     01/19/25  1638 01/20/25  0555 01/20/25  1238 01/20/25  2317 01/21/25  0542 01/21/25  1434   WBC 44.0* 38.3*  --   --  32.5*  --    HGB 12.8 10.3*   < > 9.3* 8.7* 8.2*   HCT 39.7 30.7*   < > 28.4* 26.0* 24.7*   MCV 91.6 90.5  --   --  91.6  --    PLT 98* 126*  --   --  166  --     < > = values in this interval not displayed.     BMP:   Recent Labs     01/19/25  1638 01/19/25  1638 01/20/25  0555 01/20/25  1214 01/20/25  2317 01/21/25  0542 01/21/25  1436     --  142   < > 136 136 133*   K 4.5   < > 4.8  4.8   < > 4.7 4.9  4.9 4.6     --  102   < > 100 100 97*   CO2 32  --  26   < > 23 24 25   PHOS 3.1  --  3.7  --   --  4.0  --    BUN 38*  --  49*   < > 57* 58* 60*   CREATININE 1.8*  --  2.3*   < > 2.8* 2.9* 3.0*    < > = values in this interval not displayed.       PT/INR:   Recent Labs     01/19/25  1901 01/20/25  0810   INR 1.14 1.10       Impression: 84-year-old female with history of HTN, HLD, COPD, (O2 dependence, A-fib, CHF, CAD status post CABG, remote GI bleed admitted with COPD exacerbation and choledocholithiasis s/p ERCP with stone extraction. Hospital course complicated by retroperitoneal hematoma and subacute CVA     Recommendation:  Continue supportive care  PPI BID  Monitor Hgb  Hold anticoagulation for retroperitoneal 
                                     PROGRESS NOTE  S:84 yrs Patient  admitted on 1/3/2025 with Choledocholithiasis with acute cholecystitis [K80.42]  Cholecystitis [K81.9]  Dysphagia, unspecified type [R13.10] .  Today, she denies pain. She had dark stool today per nursing. She has NG in place. TF not started. She is on 4 LNC.     Exam:   Vitals:    01/13/25 0900   BP: 129/65   Pulse: 100   Resp: 29   Temp:    SpO2: (!) 76%   Generalized: alert, no acute distress  HEENT: sclera clear, anicteric  Neck: supple, trachea midline  Heart NSR  Abdomen: soft, NT, ND  Extremities: no edema    Medications: Reviewed    Labs:  CBC:   Recent Labs     01/11/25  0549 01/12/25  0251 01/13/25  0514   WBC 19.8* 15.7* 11.1*   HGB 7.6* 7.1* 6.7*   HCT 25.0* 23.3* 22.1*   MCV 95.3 94.8 93.8    266 248     BMP:   Recent Labs     01/12/25 2014 01/13/25  0212 01/13/25  0514   * 141 146*   K 4.1 3.3* 3.3*   * 105 109   CO2 29 30 32   BUN 9 9 9   CREATININE 0.8 0.9 0.8     Attending Supervising Physician's Attestation Statement  The patient is a 84 y.o. female. I have performed a history and physical examination of the patient. I discussed the case with JOSE MIGUEL Goldman    I reviewed the patient's Past Medical History, Past Surgical History, Medications, and Allergies.     Physical Exam:  Vitals:    01/13/25 1300 01/13/25 1400 01/13/25 1500 01/13/25 1512   BP: 121/75 126/79 139/75    Pulse: 87 91 97 88   Resp: 28 (!) 32 (!) 33 24   Temp:       TempSrc:       SpO2: 100%  (!) 82% 100%   Weight:       Height:           Physical Examination:   General: moderate distress  HEENT: Sclera clear, anicteric  Neck: supple, symmetrical, trachea midline  Heart: Irregularly irregular  Abdomen: ND, soft, NT  Extremities: no edema       Assessment  83 yo female with pmx of COPD, CHF, A.fib, CKD, HTN, HLD, and CABG admitted for abdominal pain secondary to choledocholithiasis s/p ERCP w/ sphincterotomy. Hospital course c/b continued 
                                     PROGRESS NOTE  S:84 yrs Patient  admitted on 1/3/2025 with Choledocholithiasis with acute cholecystitis [K80.42]  Cholecystitis [K81.9]  Dysphagia, unspecified type [R13.10] .  Today, she is alert on SBT trial. PEG bumper loosened by myself. No signs of bleeding noted. Levophed remains at 3mcg.     Exam:   Vitals:    01/23/25 1000   BP: 126/60   Pulse: 90   Resp: 30   Temp:    SpO2: 96%   Generalized: intubated, alert  HEENT: sclera clear  Neck: supple, trachea midline  Heart: NSR  Abdomen: soft, NT, ND +PEG  Extremities: BLE +1pitting edema    Medications: Reviewed    Labs:  CBC:   Recent Labs     01/21/25  0542 01/21/25  1434 01/22/25  0602 01/22/25  1222 01/22/25  1719 01/23/25  0015 01/23/25  0615   WBC 32.5*  --  21.7*  --   --   --  16.5*   HGB 8.7*   < > 7.3*   < > 8.3* 9.0* 8.6*   HCT 26.0*   < > 22.5*   < > 25.3* 27.1* 26.0*   MCV 91.6  --  93.5  --   --   --  96.5     --  126*  --   --   --  101*    < > = values in this interval not displayed.     BMP:   Recent Labs     01/21/25  0542 01/21/25  1436 01/22/25  0602 01/22/25  1222 01/23/25  0615      < > 132* 143 144   K 4.9  4.9   < > 4.4  4.4 4.5 4.3      < > 100 109 110   CO2 24   < > 23 23 22   PHOS 4.0  --  4.2  --  4.3   BUN 58*   < > 60* 58* 53*   CREATININE 2.9*   < > 2.9* 2.8* 2.4*    < > = values in this interval not displayed.     Attending Supervising Physician's Attestation Statement  The patient is a 84 y.o. female. I have performed a history and physical examination of the patient. I discussed the case with JOSE MIGUEL Goldman    I reviewed the patient's Past Medical History, Past Surgical History, Medications, and Allergies.     Physical Exam:  Vitals:    01/23/25 1500 01/23/25 1600 01/23/25 1700 01/23/25 1802   BP: 127/62 139/77 (!) 123/58 (!) 135/57   Pulse: 86 91 88 88   Resp: 26 28 27 28   Temp:  97.5 °F (36.4 °C)     TempSrc:  Bladder     SpO2: 98% 99%  94%   Weight:     
                                     PROGRESS NOTE  S:84 yrs Patient  admitted on 1/3/2025 with Choledocholithiasis with acute cholecystitis [K80.42]  Cholecystitis [K81.9]  Dysphagia, unspecified type [R13.10] .  Today, she is drowsy but answers questions. She reports vomiting with liquids. She has not had solid intake.     Exam:   Vitals:    01/09/25 0915   BP: (!) 133/101   Pulse: 100   Resp: 20   Temp: 98 °F (36.7 °C)   SpO2: 91%   Generalized: alert ,no acute distress  HEENT: sclera clear, anicteric  Neck: supple, trachea midline  Heart NSR  Abdomen: soft, TTP upper abd, ND  Extremities: no edema    Medications: Reviewed    Imaging  Barium esophagram w tablet, 1/8  Significantly limited exam by patient conditioning.  There was no obstruction of contrast from the esophagus into the stomach. Aspiration of swallowed barium prompted immediate termination of the examination.    Attending Supervising Physician's Attestation Statement  The patient is a 84 y.o. female. I have performed a history and physical examination of the patient. I discussed the case with JOSE MIGUEL Goldman    I reviewed the patient's Past Medical History, Past Surgical History, Medications, and Allergies.     Physical Exam:  Vitals:    01/09/25 0200 01/09/25 0759 01/09/25 0915 01/09/25 1511   BP: (!) 143/72  (!) 133/101    Pulse: 97 99 100 (!) 115   Resp: 21 20 20 24   Temp: 97.4 °F (36.3 °C)  98 °F (36.7 °C)    TempSrc: Oral  Oral    SpO2: 90% 92% 91% 90%   Weight:       Height:           Physical Examination:   General: moderate distress  HEENT: Sclera clear, anicteric  Neck: supple, symmetrical, trachea midline  Heart: Irregularly irregular  Abdomen: ND, soft, NT  Extremities: no edema      Assessment  83 yo female with pmx of COPD, CHF, A.fib, CKD, HTN, HLD, and CABG admitted for abdominal pain and vomiting secondary to choledocholithiasis s/p ERCP 1/7 w/ sphincterotomy. Hospital course c/b continued vomiting w/ possible aspiration.    
                                     PROGRESS NOTE  S:84 yrs Patient  admitted on 1/3/2025 with Choledocholithiasis with acute cholecystitis [K80.42]  Cholecystitis [K81.9]  Dysphagia, unspecified type [R13.10] .  Today, she is intubated and sedated. She had brown bowel today per nursing. No signs of bleeding. She is on levophed at 14 mcg.     Exam:   Vitals:    01/20/25 1150   BP:    Pulse:    Resp:    Temp:    SpO2: 95%   Generalized: intubated, sedated  HEENT: sclera clear  Neck: supple, trachea midline  Heart: NSR  Abdomen: soft, NT, ND  Extremities: BLE pitting edema    Medications: Reviewed    Labs:  CBC:   Recent Labs     01/19/25  0945 01/19/25  1638 01/20/25  0555 01/20/25  1238   WBC 21.0* 44.0* 38.3*  --    HGB 4.4* 12.8 10.3* 10.6*   HCT 15.3* 39.7 30.7* 31.6*   MCV 95.7 91.6 90.5  --     98* 126*  --      BMP:   Recent Labs     01/19/25  0640 01/19/25  1638 01/20/25  0555 01/20/25  1214    144 142 141   K 4.3 4.5 4.8  4.8 4.8   CL 99 104 102 100   CO2 37* 32 26 26   PHOS 1.9* 3.1 3.7  --    BUN 33* 38* 49* 51*   CREATININE 1.3* 1.8* 2.3* 2.4*     Imaging   CT abd/pelvis wo con, 1/19   Large left retroperitoneal hematoma measuring up to 10 x 7 x 17 cm with a  fluid fluid level suggestive of more acute blood products in the dependent  aspect.  There is asymmetric enlargement of the left iliopsoas muscle  suggestive of underlying hematoma.  There is also high-density material in  the left subphrenic region suspicious for hematoma involving the left  diaphragm.  There is associated perisplenic fluid and stranding and an  underlying splenic injury cannot be entirely excluded as well.  Consider  further evaluation with CTA to assess for active bleeding if the patient is  able.  2. Anasarca.  Small to moderate right with small left pleural effusions.  3. Few new nodular opacities in the right lung base measuring less than 5 mm,  likely infectious or inflammatory in etiology.  Cholelithiasis and 
                                     PROGRESS NOTE  S:84 yrs Patient  admitted on 1/3/2025 with Choledocholithiasis with acute cholecystitis [K80.42]  Cholecystitis [K81.9]  Dysphagia, unspecified type [R13.10] .  Today, she is lethargic. She has been requiring continuous Bipap per nursing. Precedex started today for tachypnea. She is NPO.     Exam:   Vitals:    01/15/25 1200   BP: 103/61   Pulse: 81   Resp: 27   Temp: 97.6 °F (36.4 °C)   SpO2: 99%   Generalized: lethargic, no acute distress  HEENT: sclera clear, +NG  Neck: supple, trachea midline  Heart: ST  Abdomen: soft, NT, ND  Extremities: BLE pitting edema    Medications: Reviewed    Labs:  CBC:   Recent Labs     01/13/25  0514 01/13/25  1300 01/15/25  0116 01/15/25  0734 01/15/25  1054   WBC 11.1*  --  9.8  --   --    HGB 6.7*   < > 9.4* 10.4* 9.7*   HCT 22.1*   < > 29.3* 33.2* 32.7*   MCV 93.8  --  89.8  --   --      --  250  --   --     < > = values in this interval not displayed.     BMP:   Recent Labs     01/13/25  1300 01/14/25  0901 01/15/25  0116    144 146*   K 3.9 4.2 3.4*    108 104   CO2 27 32 37*   BUN 8 11 12   CREATININE 0.9 0.9 1.0     Attending Supervising Physician's Attestation Statement  The patient is a 84 y.o. female. I have performed a history and physical examination of the patient. I discussed the case with JOSE MIGUEL Goldman    I reviewed the patient's Past Medical History, Past Surgical History, Medications, and Allergies.     Physical Exam:  Vitals:    01/15/25 1000 01/15/25 1100 01/15/25 1127 01/15/25 1200   BP: (!) 146/108 119/76  103/61   Pulse: (!) 108 (!) 101 89 81   Resp: (!) 38 21 24 27   Temp:    97.6 °F (36.4 °C)   TempSrc:    Temporal   SpO2: 93% 100% 100% 99%   Weight:       Height:           Physical Examination:   General: moderate respiratory distress, on BiPAP  HEENT: Sclera clear, anicteric  Neck: supple, symmetrical, trachea midline  Heart: Irregularly irregular  Abdomen: ND, soft, 
                                     PROGRESS NOTE  S:84 yrs Patient  admitted on 1/3/2025 with Choledocholithiasis with acute cholecystitis [K80.42]  Cholecystitis [K81.9]  Dysphagia, unspecified type [R13.10] .  Today, she is on AVAPS. She reportedly did not wear mask overnight. She denies pain. No signs of bleeding noted.     Exam:   Vitals:    01/14/25 0903   BP:    Pulse:    Resp:    Temp: 96.9 °F (36.1 °C)   SpO2:    Generalized: tired, tachypneic   HEENT: sclera clear, anicteric  Neck: supple, trachea midline  Heart: ST  Abdomen: soft, NT, ND  Extremities: BLE pitting edema    Medications: Reviewed    Labs:  CBC:   Recent Labs     01/12/25  0251 01/13/25  0514 01/13/25  1300 01/13/25  1845 01/14/25  0127 01/14/25  0901   WBC 15.7* 11.1*  --   --   --   --    HGB 7.1* 6.7*   < > 10.2* 8.9* 9.6*   HCT 23.3* 22.1*   < > 34.1* 28.0* 30.6*   MCV 94.8 93.8  --   --   --   --     248  --   --   --   --     < > = values in this interval not displayed.     BMP:   Recent Labs     01/13/25  0514 01/13/25  1300 01/14/25  0901   * 143 144   K 3.3* 3.9 4.2    110 108   CO2 32 27 32   BUN 9 8 11   CREATININE 0.8 0.9 0.9     Assessment  83 yo female with pmx of COPD, CHF, A.fib, CKD, HTN, HLD, and CABG admitted for abdominal pain secondary to choledocholithiasis s/p ERCP. Hospital course c/b acute on chronic respiratory failure secondary to aspiration.      Plan  Continue supportive care  Monitor for signs of bleeding  PPI BID  Unable to do MBS at this time  TF held d/t respiratory status   SLP following  Aspiration precautions  Continue antibiotics  Recommend Palliative care   Outpatient cholecystectomy    CLIFTON Obando - CNP  10:03 AM 1/14/2025                        
                                     PROGRESS NOTE  S:84 yrs Patient  admitted on 1/3/2025 with Choledocholithiasis with acute cholecystitis [K80.42]  Cholecystitis [K81.9]  Dysphagia, unspecified type [R13.10] .  Today, she remains on continuous Bipap. She denies pain. She remains on Precedex gtt.     Exam:   Vitals:    01/16/25 0900   BP: (!) 156/89   Pulse: 85   Resp: 25   Temp:    SpO2: 100%   Generalized: lethargic, no acute distress  HEENT: sclera clear, +NG  Neck: supple, trachea midline  Heart: ST  Abdomen: soft, NT, ND  Extremities: BLE pitting edema    Medications: Reviewed    Labs:  CBC:   Recent Labs     01/15/25  0116 01/15/25  0734 01/15/25  1805 01/16/25  0045 01/16/25  0710   WBC 9.8  --   --   --   --    HGB 9.4*   < > 9.0* 8.4* 8.5*   HCT 29.3*   < > 28.7* 25.9* 27.4*   MCV 89.8  --   --   --   --      --   --   --   --     < > = values in this interval not displayed.     BMP:   Recent Labs     01/15/25  0116 01/15/25  1805 01/16/25  0710   * 147* 143   K 3.4* 3.9 4.1    106 103   CO2 37* 34* 30   PHOS  --  1.4*  --    BUN 12 14 17   CREATININE 1.0 1.0 1.0     Attending Supervising Physician's Attestation Statement  The patient is a 84 y.o. female. I have performed a history and physical examination of the patient. I discussed the case with JOSE MIGUEL Goldman    I reviewed the patient's Past Medical History, Past Surgical History, Medications, and Allergies.     Physical Exam:  Vitals:    01/16/25 1400 01/16/25 1503 01/16/25 1600 01/16/25 1700   BP: (!) 148/92 (!) 157/84 (!) 140/92 (!) 157/89   Pulse: 94 85 96 98   Resp: (!) 33 24 (!) 34 (!) 35   Temp:   98.9 °F (37.2 °C)    TempSrc:       SpO2: 90% 100% 96% 96%   Weight:       Height:           Physical Examination:   General: moderate respiratory distress, on BiPAP  HEENT: Sclera clear, anicteric  Neck: supple, symmetrical, trachea midline  Heart: Irregularly irregular  Abdomen: ND, soft, NT  Extremities: no 
                                     PROGRESS NOTE  S:84 yrs Patient  admitted on 1/3/2025 with Choledocholithiasis with acute cholecystitis [K80.42]  Cholecystitis [K81.9]  Dysphagia, unspecified type [R13.10] .  Today, she reports some nausea. She reports she is \"not allowed to eat.\" She has had continued vomiting.    Exam:   Vitals:    01/08/25 1123   BP:    Pulse:    Resp: 18   Temp:    SpO2: 96%   Generalized: alert ,no acute distress  HEENT: sclera clear, anicteric  Neck: supple, trachea midline  Heart NSR  Abdomen: soft, TTP upper abd, ND  Extremities: no edema    Medications: Reviewed    Labs:  CBC:   Recent Labs     01/06/25  0641 01/07/25  0552 01/08/25  0835   WBC 6.1 4.6 9.7   HGB 7.9* 8.0* 8.4*   HCT 26.7* 25.7* 26.8*   .3* 94.8 95.0    398 381     BMP:   Recent Labs     01/06/25  0641 01/07/25  0552 01/08/25  0559 01/08/25  0835    146*  --  148*   K 4.1 3.4* 3.8 3.1*    104  --  108   CO2 29 34*  --  31   BUN 9 10  --  8   CREATININE 1.0 1.2  --  0.9     LIVER PROFILE:   Recent Labs     01/06/25  0641 01/07/25  0552 01/08/25  0835   AST 17 15 15   ALT <5* 9* 6*   BILIDIR  --  <0.1 <0.1   BILITOT <0.2 <0.2 <0.2   ALKPHOS 74 68 64     Attending Supervising Physician's Attestation Statement  The patient is a 84 y.o. female. I have performed a history and physical examination of the patient. I discussed the case with JOSE MIGUEL Goldman    I reviewed the patient's Past Medical History, Past Surgical History, Medications, and Allergies.     Physical Exam:  Vitals:    01/08/25 1030 01/08/25 1123 01/08/25 1400 01/08/25 1518   BP:   (!) 145/72    Pulse:   93 93   Resp:  18 18 18   Temp:   97.7 °F (36.5 °C)    TempSrc:   Oral    SpO2: (!) 3% 96% 95% 93%   Weight:       Height:           Physical Examination:  General: moderate distress  HEENT: Sclera clear, anicteric  Neck: supple, symmetrical, trachea midline  Heart: Irregularly irregular  Abdomen: ND, soft, NT  Extremities: no 
             [facility]                            Critical care Progress Note :            CC: follow up on acute blood loss     Subjective:   Extubated   Had PEG tube   Had 3 RBC so far   Retroperitoneal hematoma and psoas muscle ,np surgical intervention   Mild drop   Off levophed  Had PEG tube   Off sedation   No visible bleed   Wbc down   Poor efforts,tachypnea ,congested   Had stroke MRI ,No DVT duplex      Access: R IJ 1/16    Intake/Output Summary (Last 24 hours) at 1/20/2025 0907  Last data filed at 1/20/2025 0603  Gross per 24 hour   Intake 2294.57 ml   Output 44 ml   Net 2250.57 ml           EXAM: BP (!) 163/148   Pulse (!) 118   Temp 100.2 °F (37.9 °C) (Bladder)   Resp (!) 31   Ht 1.626 m (5' 4.02\")   Wt 45.7 kg (100 lb 12.8 oz)   SpO2 95%   BMI 17.29 kg/m²  AVAPS  Gen: extubated 1/23  Eyes: No sclera icterus. No conjunctival injection.   ENT: No discharge. Pharynx clear.   Neck: Trachea midline.   Resp: + Accessory muscle use. No crackles.  Bilateral wheezes. No rhonchi. No dullness on percussion. Good air entry.   CV: Regular rate. Regular rhythm. No edema.   Skin: Warm and dry.   M/S: No cyanosis. No clubbing.   Neuro: Lethargic.  Responsive only to painful stimuli. extremities.   Psych: No agitation        Scheduled Meds:   vancomycin  500 mg IntraVENous Once    sodium chloride  500 mL IntraVENous Once    cefepime  1,000 mg IntraVENous Q12H    ketamine  1 mg/kg IntraVENous Once    vancomycin (VANCOCIN) intermittent dosing (placeholder)   Other RX Placeholder    sodium phosphate IVPB (CENTRAL line)  30 mmol IntraVENous Once    metroNIDAZOLE  500 mg IntraVENous Q8H    lidocaine  1 patch TransDERmal Daily    methylPREDNISolone  40 mg IntraVENous Q12H    pantoprazole (PROTONIX) 40 mg in sodium chloride (PF) 0.9 % 10 mL injection  40 mg IntraVENous BID    busPIRone  7.5 mg Oral BID    miconazole nitrate   Topical BID    ipratropium 0.5 mg-albuterol 2.5 mg  1 
             [facility]                            Critical care Progress Note :            CC: follow up on acute blood loss     Subjective:   Doing worse  RR 38-40  A lot of secretions   Family at bedside  Mild distressed  Take off picc line        Access: R IJ 1/16    Intake/Output Summary (Last 24 hours) at 1/20/2025 0907  Last data filed at 1/20/2025 0603  Gross per 24 hour   Intake 2294.57 ml   Output 44 ml   Net 2250.57 ml           EXAM: BP (!) 163/148   Pulse (!) 118   Temp 100.2 °F (37.9 °C) (Bladder)   Resp (!) 31   Ht 1.626 m (5' 4.02\")   Wt 45.7 kg (100 lb 12.8 oz)   SpO2 95%   BMI 17.29 kg/m²  AVAPS  Gen: extubated 1/23  Eyes: No sclera icterus. No conjunctival injection.   ENT: No discharge. Pharynx clear.   Neck: Trachea midline.   Resp: + Accessory muscle use. No crackles.  Bilateral wheezes. No rhonchi. No dullness on percussion. Good air entry.   CV: Regular rate. Regular rhythm. No edema.   Skin: Warm and dry.   M/S: No cyanosis. No clubbing.   Neuro: Lethargic.  Responsive only to painful stimuli. extremities.   Psych: No agitation        Scheduled Meds:   vancomycin  500 mg IntraVENous Once    sodium chloride  500 mL IntraVENous Once    cefepime  1,000 mg IntraVENous Q12H    ketamine  1 mg/kg IntraVENous Once    vancomycin (VANCOCIN) intermittent dosing (placeholder)   Other RX Placeholder    sodium phosphate IVPB (CENTRAL line)  30 mmol IntraVENous Once    metroNIDAZOLE  500 mg IntraVENous Q8H    lidocaine  1 patch TransDERmal Daily    methylPREDNISolone  40 mg IntraVENous Q12H    pantoprazole (PROTONIX) 40 mg in sodium chloride (PF) 0.9 % 10 mL injection  40 mg IntraVENous BID    busPIRone  7.5 mg Oral BID    miconazole nitrate   Topical BID    ipratropium 0.5 mg-albuterol 2.5 mg  1 Dose Inhalation 4x Daily RT    sodium chloride flush  5-40 mL IntraVENous 2 times per day    atorvastatin  20 mg Oral Nightly    budesonide  0.5 mg Nebulization BID 
             [facility]                            Critical care Progress Note :            CC: follow up on acute blood loss     Subjective:   Intubated y1/19  Had 3 RBC so far   Retroperitoneal hematoma and psoas muscle ,np surgical intervention   Hb stable @ 8 mild drop   Off levophed  On PSV   40% FiO2   Precedex 0.2 mcg/kg/hr   Procal 4.7   Heparin was reversed   No visible bleed   Hypothermia with worse WBC   Poorly responsive, intubated placed mechanical ventilation.        Access: R IJ 1/16    Intake/Output Summary (Last 24 hours) at 1/20/2025 0907  Last data filed at 1/20/2025 0603  Gross per 24 hour   Intake 2294.57 ml   Output 44 ml   Net 2250.57 ml           EXAM: BP (!) 163/148   Pulse (!) 118   Temp 100.2 °F (37.9 °C) (Bladder)   Resp (!) 31   Ht 1.626 m (5' 4.02\")   Wt 45.7 kg (100 lb 12.8 oz)   SpO2 95%   BMI 17.29 kg/m²  AVAPS  Gen: + Distress.  Appearing,intubated   Eyes: No sclera icterus. No conjunctival injection.   ENT: No discharge. Pharynx clear.   Neck: Trachea midline.   Resp: + Accessory muscle use. No crackles.  Bilateral wheezes. No rhonchi. No dullness on percussion. Good air entry.   CV: Regular rate. Regular rhythm. No edema.   Skin: Warm and dry.   M/S: No cyanosis. No clubbing.   Neuro: Lethargic.  Responsive only to painful stimuli. extremities.   Psych: No agitation        Scheduled Meds:   vancomycin  500 mg IntraVENous Once    sodium chloride  500 mL IntraVENous Once    cefepime  1,000 mg IntraVENous Q12H    ketamine  1 mg/kg IntraVENous Once    vancomycin (VANCOCIN) intermittent dosing (placeholder)   Other RX Placeholder    sodium phosphate IVPB (CENTRAL line)  30 mmol IntraVENous Once    metroNIDAZOLE  500 mg IntraVENous Q8H    lidocaine  1 patch TransDERmal Daily    methylPREDNISolone  40 mg IntraVENous Q12H    pantoprazole (PROTONIX) 40 mg in sodium chloride (PF) 0.9 % 10 mL injection  40 mg IntraVENous BID    busPIRone  7.5 mg 
             [facility]                            Critical care Progress Note :            CC: follow up on acute blood loss     Subjective:   Intubated y1/19  Had PEG tube   Had 3 RBC so far   Retroperitoneal hematoma and psoas muscle ,np surgical intervention   Mild drop   Off levophed  Had PEG tube   Propofol   Off sedation   Heparin was reversed   No visible bleed   Hypothermia with worse WBC   Poorly responsive, intubated placed mechanical ventilation.  Had stroke MRI ,No DVT duplex      Access: R IJ 1/16    Intake/Output Summary (Last 24 hours) at 1/20/2025 0907  Last data filed at 1/20/2025 0603  Gross per 24 hour   Intake 2294.57 ml   Output 44 ml   Net 2250.57 ml           EXAM: BP (!) 163/148   Pulse (!) 118   Temp 100.2 °F (37.9 °C) (Bladder)   Resp (!) 31   Ht 1.626 m (5' 4.02\")   Wt 45.7 kg (100 lb 12.8 oz)   SpO2 95%   BMI 17.29 kg/m²  AVAPS  Gen: + Distress.  Appearing,intubated   Eyes: No sclera icterus. No conjunctival injection.   ENT: No discharge. Pharynx clear.   Neck: Trachea midline.   Resp: + Accessory muscle use. No crackles.  Bilateral wheezes. No rhonchi. No dullness on percussion. Good air entry.   CV: Regular rate. Regular rhythm. No edema.   Skin: Warm and dry.   M/S: No cyanosis. No clubbing.   Neuro: Lethargic.  Responsive only to painful stimuli. extremities.   Psych: No agitation        Scheduled Meds:   vancomycin  500 mg IntraVENous Once    sodium chloride  500 mL IntraVENous Once    cefepime  1,000 mg IntraVENous Q12H    ketamine  1 mg/kg IntraVENous Once    vancomycin (VANCOCIN) intermittent dosing (placeholder)   Other RX Placeholder    sodium phosphate IVPB (CENTRAL line)  30 mmol IntraVENous Once    metroNIDAZOLE  500 mg IntraVENous Q8H    lidocaine  1 patch TransDERmal Daily    methylPREDNISolone  40 mg IntraVENous Q12H    pantoprazole (PROTONIX) 40 mg in sodium chloride (PF) 0.9 % 10 mL injection  40 mg IntraVENous BID    
             [facility]                            Critical care Progress Note :            CC: follow up on acute blood loss     Subjective:   Intubated yesterday   Had 3 RBC   Retroperitoneal hematoma and psoas muscle ,np surgical intervention   Hb stable  On levophed 15   AVAPS overnight- EPAP 5, PS 10-20 and   40% FiO2   Precedex 0.2 mcg/kg/hr   Procal 4.7   Heparin was reversed   No visible bleed   Hypothermia with worse WBC   Poorly responsive, intubated placed mechanical ventilation.        Access: R IJ 1/16    Intake/Output Summary (Last 24 hours) at 1/20/2025 0907  Last data filed at 1/20/2025 0603  Gross per 24 hour   Intake 2294.57 ml   Output 44 ml   Net 2250.57 ml           EXAM: BP (!) 163/148   Pulse (!) 118   Temp 100.2 °F (37.9 °C) (Bladder)   Resp (!) 31   Ht 1.626 m (5' 4.02\")   Wt 45.7 kg (100 lb 12.8 oz)   SpO2 95%   BMI 17.29 kg/m²  AVAPS  Gen: + Distress.  Appearing,intubated   Eyes: No sclera icterus. No conjunctival injection.   ENT: No discharge. Pharynx clear.   Neck: Trachea midline.   Resp: + Accessory muscle use. No crackles.  Bilateral wheezes. No rhonchi. No dullness on percussion. Good air entry.   CV: Regular rate. Regular rhythm. No edema.   Skin: Warm and dry.   M/S: No cyanosis. No clubbing.   Neuro: Lethargic.  Responsive only to painful stimuli. extremities.   Psych: No agitation        Scheduled Meds:   vancomycin  500 mg IntraVENous Once    sodium chloride  500 mL IntraVENous Once    cefepime  1,000 mg IntraVENous Q12H    ketamine  1 mg/kg IntraVENous Once    vancomycin (VANCOCIN) intermittent dosing (placeholder)   Other RX Placeholder    sodium phosphate IVPB (CENTRAL line)  30 mmol IntraVENous Once    metroNIDAZOLE  500 mg IntraVENous Q8H    lidocaine  1 patch TransDERmal Daily    methylPREDNISolone  40 mg IntraVENous Q12H    pantoprazole (PROTONIX) 40 mg in sodium chloride (PF) 0.9 % 10 mL injection  40 mg IntraVENous 
             [facility]                            Critical care Progress Note :            CC: follow up on acute blood loss     Subjective:   Worse   On BiPAP  On precedex  Very fragile   Levophed 6   RR 38-40  A lot of secretions   Family at bedside  distress      Access: CABRERA THAO 1/16    Intake/Output Summary (Last 24 hours) at 1/20/2025 0907  Last data filed at 1/20/2025 0603  Gross per 24 hour   Intake 2294.57 ml   Output 44 ml   Net 2250.57 ml           EXAM: BP (!) 163/148   Pulse (!) 118   Temp 100.2 °F (37.9 °C) (Bladder)   Resp (!) 31   Ht 1.626 m (5' 4.02\")   Wt 45.7 kg (100 lb 12.8 oz)   SpO2 95%   BMI 17.29 kg/m²  AVAPS  Gen: extubated 1/23,non responsive on bipap  Eyes: No sclera icterus. No conjunctival injection.   ENT: No discharge. Pharynx clear.   Neck: Trachea midline.   Resp: + Accessory muscle use. No crackles.  Bilateral wheezes. No rhonchi. No dullness on percussion. Good air entry.   CV: Regular rate. Regular rhythm. No edema.   Skin: Warm and dry.   M/S: No cyanosis. No clubbing.   Neuro:no responsive .   Psych: No agitation        Scheduled Meds:   vancomycin  500 mg IntraVENous Once    sodium chloride  500 mL IntraVENous Once    cefepime  1,000 mg IntraVENous Q12H    ketamine  1 mg/kg IntraVENous Once    vancomycin (VANCOCIN) intermittent dosing (placeholder)   Other RX Placeholder    sodium phosphate IVPB (CENTRAL line)  30 mmol IntraVENous Once    metroNIDAZOLE  500 mg IntraVENous Q8H    lidocaine  1 patch TransDERmal Daily    methylPREDNISolone  40 mg IntraVENous Q12H    pantoprazole (PROTONIX) 40 mg in sodium chloride (PF) 0.9 % 10 mL injection  40 mg IntraVENous BID    busPIRone  7.5 mg Oral BID    miconazole nitrate   Topical BID    ipratropium 0.5 mg-albuterol 2.5 mg  1 Dose Inhalation 4x Daily RT    sodium chloride flush  5-40 mL IntraVENous 2 times per day    atorvastatin  20 mg Oral Nightly    budesonide  0.5 mg Nebulization BID RT    
           Progress Note    HISTORY     CC:   Abdominal pain, AMS, Respiratory Failure   We are following for acute kidney injury       Subjective/   HPI:  Patient remains critically ill on vent and pressors.  Kidney function worsening.  Low urine volume.  Daughter is at the bedside.      ROS:  Constitutional:  No fevers  Respiratory:  No wheezing, on vent   :  No hematuria, anuric     Social Hx:  Daughter at the bedside     Past Medical and Surgical History:  - Reviewed, no changes     EXAM       Objective/     Vitals:    01/21/25 0700 01/21/25 0709 01/21/25 0800 01/21/25 1000   BP: (!) 110/55  115/61 (!) 100/56   Pulse: 64  91 (!) 103   Resp: 20 26 28   Temp:       TempSrc:       SpO2: 97% 97%  96%   Weight:       Height:         24HR INTAKE/OUTPUT:    Intake/Output Summary (Last 24 hours) at 1/21/2025 1115  Last data filed at 1/21/2025 1017  Gross per 24 hour   Intake 1936.99 ml   Output 110 ml   Net 1826.99 ml     Constitutional:  Critically ill   Eyes:  Pupils reactive, sclera clear   Neck:  Normal thyroid, no masses   Cardiovascular:  Regular, no rub  Respiratory:  on vent, no wheezing  Psychiatry:  sedated on vent, LINDA   Abdomen: +bs, soft, nt, no masses   Musculoskeletal: trace LE edema, no clubbing   Lymphatics:  No LAD in neck, no supraclavicular nodes   :  bond in place       MEDICAL DECISION MAKING       Data/  Recent Labs     01/19/25  1638 01/20/25  0555 01/20/25  1238 01/20/25  1739 01/20/25  2317 01/21/25  0542   WBC 44.0* 38.3*  --   --   --  32.5*   HGB 12.8 10.3*   < > 10.0* 9.3* 8.7*   HCT 39.7 30.7*   < > 30.0* 28.4* 26.0*   MCV 91.6 90.5  --   --   --  91.6   PLT 98* 126*  --   --   --  166    < > = values in this interval not displayed.     Recent Labs     01/19/25  1638 01/20/25  0555 01/20/25  1214 01/20/25  1739 01/20/25  2317 01/21/25  0542    142   < > 138 136 136   K 4.5 4.8  4.8   < > 4.7 4.7 4.9  4.9    102   < > 99 100 100   CO2 32 26   < > 26 23 24   GLUCOSE 156* 
           Progress Note    HISTORY     CC:   Abdominal pain, AMS, Respiratory Failure   We are following for acute kidney injury       Subjective/   HPI:  Patient remains critically ill on vent and pressors.  Over the last 24 hours she has been anuric.  Daughter is at the bedside.      ROS:  Constitutional:  No fevers  Respiratory:  No wheezing, on vent   :  No hematuria, anuric     Social Hx:  Daughter at the bedside     Past Medical and Surgical History:  - Reviewed, no changes     EXAM       Objective/     Vitals:    01/20/25 0700 01/20/25 0718 01/20/25 0800 01/20/25 0900   BP: 104/75  (!) 163/148 (!) 87/30   Pulse: (!) 105  (!) 118 (!) 128   Resp: 23  (!) 31 30   Temp: 100.2 °F (37.9 °C)      TempSrc: Bladder      SpO2:  98% 95%    Weight:       Height:         24HR INTAKE/OUTPUT:    Intake/Output Summary (Last 24 hours) at 1/20/2025 0956  Last data filed at 1/20/2025 0603  Gross per 24 hour   Intake 2294.57 ml   Output 44 ml   Net 2250.57 ml     Constitutional:  Critically ill   Eyes:  Pupils reactive, sclera clear   Neck:  Normal thyroid, no masses   Cardiovascular:  Regular, no rub  Respiratory:  on vent, no wheezing  Psychiatry:  sedated on vent, LINDA   Abdomen: +bs, soft, nt, no masses   Musculoskeletal: + LE edema, no clubbing   Lymphatics:  No LAD in neck, no supraclavicular nodes   :  bond in place       MEDICAL DECISION MAKING       Data/  Recent Labs     01/19/25  0945 01/19/25  1638 01/20/25  0555   WBC 21.0* 44.0* 38.3*   HGB 4.4* 12.8 10.3*   HCT 15.3* 39.7 30.7*   MCV 95.7 91.6 90.5    98* 126*     Recent Labs     01/19/25  0640 01/19/25  1638 01/20/25  0555    144 142   K 4.3 4.5 4.8  4.8   CL 99 104 102   CO2 37* 32 26   GLUCOSE 201* 156* 148*   PHOS 1.9* 3.1 3.7   BUN 33* 38* 49*   CREATININE 1.3* 1.8* 2.3*   LABGLOM 41* 28* 21*       Assessment/     Acute Kidney Injury:  KDIGO stage 3  Etiology:  ATN / shock with contrast   Clinical:  Anuric but electrolytes stable for the 
           Progress Note    HISTORY     CC:   Abdominal pain, AMS, Respiratory Failure   We are following for acute kidney injury       Subjective/   HPI:  Patient remains critically ill.  Extubated.  Off bilevel.  Kidney function worsening.  Worsening edema.    ROS:  Constitutional:  No fevers  Respiratory:  No wheezing, extubated   :  No hematuria, non-oliguric     Social Hx:  family at the bedside     Past Medical and Surgical History:  - Reviewed, no changes     EXAM       Objective/     Vitals:    01/26/25 0600 01/26/25 0700 01/26/25 0723 01/26/25 0727   BP: 110/62 130/67     Pulse: 95 (!) 115 (!) 112    Resp: 24 30 24 28   Temp:  97.5 °F (36.4 °C)     TempSrc:  Bladder     SpO2: 100% 97% 97% 99%   Weight:       Height:         24HR INTAKE/OUTPUT:    Intake/Output Summary (Last 24 hours) at 1/26/2025 0944  Last data filed at 1/26/2025 0600  Gross per 24 hour   Intake 1581.82 ml   Output 283 ml   Net 1298.82 ml     Constitutional:  Critically ill   Neck:  Normal thyroid, no masses   Cardiovascular:  Regular, no rub  Respiratory:  extubated to bilevel, no wheezing  Psychiatry:  not responsive   Abdomen: +bs, soft, nt, no masses   Musculoskeletal: trace LE edema, no clubbing   Lymphatics:  No LAD in neck, no supraclavicular nodes   :  bond in place       MEDICAL DECISION MAKING       Data/  Recent Labs     01/24/25  0440 01/25/25  0457 01/26/25  0426   WBC 15.5* 16.3* 19.5*   HGB 9.8* 10.1* 10.0*   HCT 29.7* 31.3* 31.5*   MCV 93.4 98.7 99.8   PLT 95* 102* 118*     Recent Labs     01/24/25  0440 01/25/25  0457 01/26/25  0426    145 139   K 4.2 4.1 4.1   * 114* 110   CO2 22 22 21   GLUCOSE 105* 131* 162*   PHOS 4.1 3.6 3.4   BUN 50* 52* 62*   CREATININE 2.3* 2.1* 2.2*   LABGLOM 21* 23* 22*       Assessment/     Acute Kidney Injury:  KDIGO stage 3  Etiology:  ATN / shock with contrast   Clinical:  Seems to have reached plateau phase but also getting fluid overloaded   Altered Mental 
           Progress Note    HISTORY     CC:   Abdominal pain, AMS, Respiratory Failure   We are following for acute kidney injury       Subjective/   HPI:  Patient remains critically ill.  Extubated.  Off bilevel.  Not responding too commands.  Not following or responding to daughter.      ROS:  Constitutional:  No fevers  Respiratory:  No wheezing, extubated   :  No hematuria, non-oliguric     Social Hx:  family at the bedside     Past Medical and Surgical History:  - Reviewed, no changes     EXAM       Objective/     Vitals:    01/25/25 0700 01/25/25 0730 01/25/25 0733 01/25/25 0741   BP: (!) 118/58      Pulse: 97 96     Resp: 23 22 22    Temp: 98.6 °F (37 °C)      TempSrc: Bladder      SpO2: 98% 99% 99% 99%   Weight:       Height:         24HR INTAKE/OUTPUT:    Intake/Output Summary (Last 24 hours) at 1/25/2025 0807  Last data filed at 1/25/2025 0600  Gross per 24 hour   Intake 2364.21 ml   Output 582 ml   Net 1782.21 ml     Constitutional:  Critically ill   Neck:  Normal thyroid, no masses   Cardiovascular:  Regular, no rub  Respiratory:  extubated to bilevel, no wheezing  Psychiatry:  not responsive   Abdomen: +bs, soft, nt, no masses   Musculoskeletal: trace LE edema, no clubbing   Lymphatics:  No LAD in neck, no supraclavicular nodes   :  bond in place       MEDICAL DECISION MAKING       Data/  Recent Labs     01/23/25  0615 01/23/25 2029 01/24/25  0440 01/25/25  0457   WBC 16.5*  --  15.5* 16.3*   HGB 8.6* 9.6* 9.8* 10.1*   HCT 26.0* 29.9* 29.7* 31.3*   MCV 96.5  --  93.4 98.7   *  --  95* 102*     Recent Labs     01/22/25  1222 01/23/25  0615 01/24/25  0440 01/25/25  0457    144 143 145   K 4.5 4.3 4.2 4.1    110 111* 114*   CO2 23 22 22 22   GLUCOSE 100* 74 105* 131*   PHOS  --  4.3 4.1 3.6   MG 1.73*  --   --   --    BUN 58* 53* 50* 52*   CREATININE 2.8* 2.4* 2.3* 2.1*   LABGLOM 16* 19* 21* 23*       Assessment/     Acute Kidney Injury:  KDIGO stage 3  Etiology:  ATN / shock with 
           Progress Note    HISTORY     CC:   Abdominal pain, AMS, Respiratory Failure   We are following for acute kidney injury       Subjective/   HPI:  Patient remains critically ill.  On vent and pressors.  Kidney function is a bit better and urine volume has picked up    ROS:  Constitutional:  No fevers  Respiratory:  No wheezing, extubated   :  No hematuria, non-oliguric     Social Hx:  family at the bedside     Past Medical and Surgical History:  - Reviewed, no changes     EXAM       Objective/     Vitals:    01/24/25 0800 01/24/25 0831 01/24/25 0900 01/24/25 1000   BP: 138/75  (!) 141/68 (!) 144/70   Pulse: 95  100 (!) 104   Resp: 28  28 27   Temp:       TempSrc:       SpO2: 100% 93% 96% 98%   Weight:       Height:         24HR INTAKE/OUTPUT:    Intake/Output Summary (Last 24 hours) at 1/24/2025 1101  Last data filed at 1/24/2025 0800  Gross per 24 hour   Intake 2527.6 ml   Output 920 ml   Net 1607.6 ml     Constitutional:  Critically ill   Eyes:  Pupils reactive, sclera clear   Neck:  Normal thyroid, no masses   Cardiovascular:  Regular, no rub  Respiratory:  extubated to bilevel, no wheezing  Psychiatry:  sedated on vent, LINDA   Abdomen: +bs, soft, nt, no masses   Musculoskeletal: trace LE edema, no clubbing   Lymphatics:  No LAD in neck, no supraclavicular nodes   :  bond in place       MEDICAL DECISION MAKING       Data/  Recent Labs     01/22/25  0602 01/22/25  1222 01/23/25  0615 01/23/25 2029 01/24/25  0440   WBC 21.7*  --  16.5*  --  15.5*   HGB 7.3*   < > 8.6* 9.6* 9.8*   HCT 22.5*   < > 26.0* 29.9* 29.7*   MCV 93.5  --  96.5  --  93.4   *  --  101*  --  95*    < > = values in this interval not displayed.     Recent Labs     01/22/25  0602 01/22/25  1222 01/23/25  0615 01/24/25  0440   * 143 144 143   K 4.4  4.4 4.5 4.3 4.2    109 110 111*   CO2 23 23 22 22   GLUCOSE 103* 100* 74 105*   PHOS 4.2  --  4.3 4.1   MG  --  1.73*  --   --    BUN 60* 58* 53* 50*   CREATININE 2.9* 
           Progress Note    HISTORY     CC:   Abdominal pain, AMS, Respiratory Failure   We are following for acute kidney injury       Subjective/   HPI:  Patient remains critically ill.  On vent and pressors.  Kidney function is a bit better and urine volume has picked up    ROS:  Constitutional:  No fevers  Respiratory:  No wheezing, on vent   :  No hematuria, non-oliguric     Social Hx:  No family at the bedside     Past Medical and Surgical History:  - Reviewed, no changes     EXAM       Objective/     Vitals:    01/23/25 0400 01/23/25 0500 01/23/25 0600 01/23/25 0700   BP: (!) 145/65 (!) 153/69 139/63 (!) 134/56   Pulse: 63 68 57 69   Resp: 20 22 20 20   Temp:    97 °F (36.1 °C)   TempSrc:    Bladder   SpO2:  100% 100%    Weight:       Height:         24HR INTAKE/OUTPUT:    Intake/Output Summary (Last 24 hours) at 1/23/2025 0817  Last data filed at 1/23/2025 0608  Gross per 24 hour   Intake 2501.45 ml   Output 657 ml   Net 1844.45 ml     Constitutional:  Critically ill   Eyes:  Pupils reactive, sclera clear   Neck:  Normal thyroid, no masses   Cardiovascular:  Regular, no rub  Respiratory:  on vent, no wheezing  Psychiatry:  sedated on vent, LINDA   Abdomen: +bs, soft, nt, no masses   Musculoskeletal: trace LE edema, no clubbing   Lymphatics:  No LAD in neck, no supraclavicular nodes   :  bond in place       MEDICAL DECISION MAKING       Data/  Recent Labs     01/21/25  0542 01/21/25  1434 01/22/25  0602 01/22/25  1222 01/22/25  1719 01/23/25  0015 01/23/25  0615   WBC 32.5*  --  21.7*  --   --   --  16.5*   HGB 8.7*   < > 7.3*   < > 8.3* 9.0* 8.6*   HCT 26.0*   < > 22.5*   < > 25.3* 27.1* 26.0*   MCV 91.6  --  93.5  --   --   --  96.5     --  126*  --   --   --  101*    < > = values in this interval not displayed.     Recent Labs     01/21/25  0542 01/21/25  1436 01/22/25  0602 01/22/25  1222 01/23/25  0615      < > 132* 143 144   K 4.9  4.9   < > 4.4  4.4 4.5 4.3      < > 100 109 110 
           Progress Note    HISTORY     CC:   Abdominal pain, AMS, Respiratory Failure   We are following for acute kidney injury       Subjective/   HPI:  Patient remains critically ill.  PEG tube placed today with out complication.  She has started to make some urine with IV fluids.  Remains on vent.      ROS:  Constitutional:  No fevers  Respiratory:  No wheezing, on vent   :  No hematuria, non-oliguric     Social Hx:  No family at the bedside     Past Medical and Surgical History:  - Reviewed, no changes     EXAM       Objective/     Vitals:    01/22/25 1115 01/22/25 1131 01/22/25 1137 01/22/25 1141   BP: (!) 103/58 (!) 94/54 (!) 89/53 (!) 90/48   Pulse: 81 84 73    Resp: 24 (!) 31 17    Temp:   97.5 °F (36.4 °C)    TempSrc:   Axillary    SpO2: 94% 96%     Weight:       Height:         24HR INTAKE/OUTPUT:    Intake/Output Summary (Last 24 hours) at 1/22/2025 1154  Last data filed at 1/22/2025 0931  Gross per 24 hour   Intake 1999.37 ml   Output 447 ml   Net 1552.37 ml     Constitutional:  Critically ill   Eyes:  Pupils reactive, sclera clear   Neck:  Normal thyroid, no masses   Cardiovascular:  Regular, no rub  Respiratory:  on vent, no wheezing  Psychiatry:  sedated on vent, LINDA   Abdomen: +bs, soft, nt, no masses   Musculoskeletal: trace LE edema, no clubbing   Lymphatics:  No LAD in neck, no supraclavicular nodes   :  bond in place       MEDICAL DECISION MAKING       Data/  Recent Labs     01/20/25  0555 01/20/25  1238 01/21/25  0542 01/21/25  1434 01/21/25  1805 01/22/25  0040 01/22/25  0602   WBC 38.3*  --  32.5*  --   --   --  21.7*   HGB 10.3*   < > 8.7*   < > 7.9* 7.7* 7.3*   HCT 30.7*   < > 26.0*   < > 23.9* 23.3* 22.5*   MCV 90.5  --  91.6  --   --   --  93.5   *  --  166  --   --   --  126*    < > = values in this interval not displayed.     Recent Labs     01/20/25  0555 01/20/25  1214 01/21/25  0542 01/21/25  1436 01/21/25  1805 01/22/25  0040 01/22/25  0602      < > 136   < > 140 
   01/05/25 2322   NIV Type   Equipment Type V60   Mode AVAPS   Mask Type Full face mask   Mask Size Medium   Assessment   Level of Consciousness 0   Comfort Level Good   Using Accessory Muscles No   Mask Compliance Good   Skin Assessment Clean, dry, & intact   Skin Protection for O2 Device Yes   Orientation Middle   Location Nose   Intervention(s) Skin Barrier   Settings/Measurements   PIP Observed 12 cm H20   CPAP/EPAP 5 cmH2O   IPAP Min 10 cmH2O   IPAP Max 20 cmH2O   Vt (Set, mL) 500 mL   Vt (Measured) 584 mL   Rate Ordered 14   Insp Rise Time (%) 3 %   FiO2  50 %   I Time/ I Time % 0.9 s   Mask Leak (lpm) 56 lpm   Patient's Home Machine No   Alarm Settings   Alarms On Y   Low Pressure (cmH2O) 5 cmH2O   High Pressure (cmH2O) 40 cmH2O   RR Low (bpm) 10   RR High (bpm) 40 br/min   Oxygen Therapy/Pulse Ox   O2 Therapy Oxygen   O2 Device PAP (positive airway pressure)   Pulse Oximeter Device Mode Continuous   Pulse Oximeter Device Location Finger       
   01/06/25 0309   NIV Type   NIV Started/Stopped On   Equipment Type V60   Mode AVAPS   Mask Type Full face mask   Mask Size Medium   Assessment   Level of Consciousness 0   Comfort Level Good   Using Accessory Muscles No   Mask Compliance Good   Skin Assessment Clean, dry, & intact   Skin Protection for O2 Device Yes   Orientation Middle   Location Nose   Intervention(s) Skin Barrier   Settings/Measurements   PIP Observed 12 cm H20   CPAP/EPAP 5 cmH2O   IPAP Min 10 cmH2O   IPAP Max 20 cmH2O   Vt (Set, mL) 500 mL   Vt (Measured) 549 mL   Rate Ordered 14   Insp Rise Time (%) 3 %   FiO2  50 %   I Time/ I Time % 0.9 s   Minute Volume (L/min) 9.3 Liters   Mask Leak (lpm) 47 lpm   Patient's Home Machine No   Alarm Settings   Alarms On Y   Low Pressure (cmH2O) 5 cmH2O   High Pressure (cmH2O) 40 cmH2O   RR Low (bpm) 10   RR High (bpm) 40 br/min   Oxygen Therapy/Pulse Ox   O2 Therapy Oxygen   O2 Device PAP (positive airway pressure)   Pulse Oximeter Device Mode Continuous   Pulse Oximeter Device Location Finger       
   01/06/25 1300   RT Protocol   History Pulmonary Disease 2   Respiratory pattern 2   Breath sounds 2   Cough 0   Indications for Bronchodilator Therapy On home bronchodilators   Bronchodilator Assessment Score 6       
   01/06/25 2321   NIV Type   NIV Started/Stopped (S)  On   Equipment Type v60   Mode AVAPS   Mask Type Full face mask   Mask Size Small   Settings/Measurements   CPAP/EPAP 5 cmH2O   IPAP Min 10 cmH2O   IPAP Max 20 cmH2O   Vt (Set, mL) 500 mL   Vt (Measured) 455 mL   Rate Ordered 14   FiO2  50 %   I Time/ I Time % 0.9 s   Minute Volume (L/min) 9.5 Liters   Mask Leak (lpm) 5 lpm   Patient's Home Machine No   Alarm Settings   Alarms On Y   Low Pressure (cmH2O) 5 cmH2O   High Pressure (cmH2O) 40 cmH2O   Delay Alarm 20 sec(s)   RR Low (bpm) 14   RR High (bpm) 40 br/min       
   01/07/25 0312   NIV Type   NIV Started/Stopped On   Equipment Type v60   Mode AVAPS   Mask Type Full face mask   Mask Size Small   Settings/Measurements   CPAP/EPAP 5 cmH2O   IPAP Min 10 cmH2O   IPAP Max 20 cmH2O   Vt (Set, mL) 500 mL   Vt (Measured) 488 mL   Rate Ordered 14   FiO2  50 %   I Time/ I Time % 0.9 s   Minute Volume (L/min) 10 Liters   Mask Leak (lpm) 38 lpm   Patient's Home Machine No       
   01/08/25 0435   NIV Type   NIV Started/Stopped On   Equipment Type v60   Mode AVAPS   Mask Type Full face mask   Mask Size Small   Assessment   Pulse 86   Respirations 25   SpO2 100 %   Comfort Level Good   Using Accessory Muscles No   Mask Compliance Fair   Skin Assessment Clean, dry, & intact   Skin Protection for O2 Device Yes   Location Nose   Breath Sounds   Respiratory Pattern Regular   Right Upper Lobe Rhonchi   Right Middle Lobe Diminished   Right Lower Lobe Diminished   Left Upper Lobe Rhonchi   Left Lower Lobe Diminished   Settings/Measurements   PIP Observed 14 cm H20   CPAP/EPAP 5 cmH2O   IPAP Min 10 cmH2O   IPAP Max 20 cmH2O   Vt (Set, mL) 500 mL   Vt (Measured) 385 mL   Rate Ordered 14   Insp Rise Time (%) 3 %   FiO2  50 %   I Time/ I Time % 0.9 s   Minute Volume (L/min) 9.8 Liters   Mask Leak (lpm) 0 lpm   Patient's Home Machine No   Alarm Settings   Alarms On Y   Low Pressure (cmH2O) 5 cmH2O   High Pressure (cmH2O) 40 cmH2O   Apnea (secs) 20 secs   RR Low (bpm) 12   RR High (bpm) 40 br/min       
   01/08/25 0604   Oxygen Therapy/Pulse Ox   $ABG $Arterial Puncture   Christian's Test #1 Pos   Site #1 Right Radial   Site Prepped #1 Yes   Number of Attempts #1 1   Pressure Held #1 Yes   Complications #1 None   Post-procedure #1 Standard   Specimen Status #1 To lab   How Tolerated? Tolerated well       
   01/08/25 2301   NIV Type   NIV Started/Stopped On   Equipment Type v60   Mode AVAPS   Mask Type Full face mask   Mask Size Small   Assessment   Pulse 85   Respirations 21   SpO2 100 %   Comfort Level Good   Using Accessory Muscles No   Mask Compliance Good   Skin Assessment Clean, dry, & intact   Skin Protection for O2 Device Yes   Location Nose   Intervention(s) Skin Barrier   Breath Sounds   Respiratory Pattern Regular   Right Upper Lobe Rhonchi   Right Middle Lobe Diminished   Right Lower Lobe Diminished   Left Upper Lobe Rhonchi   Left Lower Lobe Diminished   Settings/Measurements   PIP Observed 15 cm H20   CPAP/EPAP 5 cmH2O   IPAP Min 10 cmH2O   IPAP Max 20 cmH2O   Vt (Set, mL) 500 mL   Vt (Measured) 315 mL   Rate Ordered 14   Insp Rise Time (%) 3 %   FiO2  35 %   I Time/ I Time % 0.9 s   Minute Volume (L/min) 6.9 Liters   Mask Leak (lpm) 5 lpm   Patient's Home Machine No   Alarm Settings   Alarms On Y   Low Pressure (cmH2O) 5 cmH2O   High Pressure (cmH2O) 40 cmH2O   Apnea (secs) 20 secs   RR Low (bpm) 12   RR High (bpm) 40 br/min       
   01/09/25 2326   Assessment   Pulse 78   Respirations 26   SpO2 97 %   Comfort Level Good   Using Accessory Muscles No   Mask Compliance Good   Skin Assessment Clean, dry, & intact   Skin Protection for O2 Device Yes   Location Nose   Intervention(s) Skin Barrier   Breath Sounds   Respiratory Pattern Tachypneic   Right Upper Lobe Rhonchi   Right Middle Lobe Diminished   Right Lower Lobe Diminished   Left Upper Lobe Rhonchi   Left Lower Lobe Diminished   Settings/Measurements   PIP Observed 16 cm H20   CPAP/EPAP 5 cmH2O   IPAP Min 10 cmH2O   IPAP Max 20 cmH2O   Vt (Set, mL) 500 mL   Vt (Measured) 330 mL   Rate Ordered 14   Insp Rise Time (%) 3 %   FiO2  60 %   I Time/ I Time % 0.9 s   Minute Volume (L/min) 8.5 Liters   Mask Leak (lpm) 16 lpm   Patient's Home Machine No   Alarm Settings   Alarms On Y   Low Pressure (cmH2O) 5 cmH2O   High Pressure (cmH2O) 40 cmH2O   Apnea (secs) 20 secs   RR Low (bpm) 12   RR High (bpm) 40 br/min       
   01/10/25 0733   NIV Type   Mode AVAPS   Mask Type Full face mask   Mask Size Small   Assessment   Respirations 22   SpO2 100 %   Settings/Measurements   CPAP/EPAP 5 cmH2O   IPAP Min 10 cmH2O   IPAP Max 20 cmH2O   Vt (Set, mL) 500 mL   Vt (Measured) 491 mL   Rate Ordered 14   FiO2  40 %       
   01/10/25 1515   NIV Type   Mode AVAPS   Mask Type Full face mask   Mask Size Small   Assessment   Respirations 24   SpO2 100 %   Settings/Measurements   CPAP/EPAP 5 cmH2O   IPAP Min 10 cmH2O   IPAP Max 20 cmH2O   Vt (Set, mL) 500 mL   Vt (Measured) 412 mL   Rate Ordered 14   FiO2  40 %     I placed patient back on AVAPS  
   01/10/25 1927   NIV Type   Mode AVAPS   Mask Type Full face mask   Mask Size Small   Assessment   Respirations 28   SpO2 100 %   Settings/Measurements   CPAP/EPAP 5 cmH2O   IPAP Min 10 cmH2O   IPAP Max 20 cmH2O   Vt (Set, mL) 500 mL   Vt (Measured) 439 mL   Rate Ordered 14   FiO2  35 %       
   01/10/25 2340   NIV Type   NIV Started/Stopped On   Equipment Type v60   Mode AVAPS   Mask Type Full face mask   Mask Size Small   Assessment   Pulse (!) 109   Respirations (!) 33   Settings/Measurements   CPAP/EPAP 5 cmH2O   IPAP Min 10 cmH2O   IPAP Max 20 cmH2O   Vt (Set, mL) 500 mL   Vt (Measured) 447 mL   Rate Ordered 14   FiO2  35 %   I Time/ I Time % 0.9 s   Minute Volume (L/min) 13 Liters   Mask Leak (lpm) 14 lpm   Patient's Home Machine No   Alarm Settings   Alarms On Y   Low Pressure (cmH2O) 5 cmH2O   High Pressure (cmH2O) 40 cmH2O   Delay Alarm 20 sec(s)   RR Low (bpm) 14   RR High (bpm) 40 br/min       
   01/11/25 0304   NIV Type   Equipment Type v60   Mode AVAPS   Mask Type Full face mask   Mask Size Small   Assessment   Pulse (!) 108   Respirations 28   BP (!) 148/111   SpO2 (!) 78 %   Settings/Measurements   CPAP/EPAP 5 cmH2O   IPAP Min 10 cmH2O   IPAP Max 20 cmH2O   Vt (Set, mL) 500 mL   Vt (Measured) 423 mL   Rate Ordered 14   FiO2  35 %   Minute Volume (L/min) 12.7 Liters   Mask Leak (lpm) 0 lpm   Patient Observation   Patient Observations spo2 100%on 35% bipap       
   01/11/25 2323   NIV Type   NIV Started/Stopped On   Equipment Type v60   Mode AVAPS   Mask Type Full face mask   Mask Size Small   Assessment   Pulse 73   Respirations 22   Settings/Measurements   CPAP/EPAP 5 cmH2O   IPAP Min 10 cmH2O   IPAP Max 20 cmH2O   Vt (Set, mL) 500 mL   Vt (Measured) 411 mL   Rate Ordered 14   FiO2  40 %   I Time/ I Time % 0.9 s   Minute Volume (L/min) 7 Liters   Mask Leak (lpm) 30 lpm   Patient's Home Machine No   Alarm Settings   Alarms On Y   Low Pressure (cmH2O) 5 cmH2O   High Pressure (cmH2O) 40 cmH2O   Delay Alarm 20 sec(s)   RR Low (bpm) 14   RR High (bpm) 40 br/min       
   01/12/25 0320   NIV Type   NIV Started/Stopped On   Equipment Type v60   Mode AVAPS   Mask Type Full face mask   Mask Size Small   Assessment   Pulse 93   Respirations 26   SpO2 98 %   Settings/Measurements   CPAP/EPAP 5 cmH2O   IPAP Min 10 cmH2O   IPAP Max 20 cmH2O   Vt (Set, mL) 500 mL   Vt (Measured) 327 mL   Rate Ordered 14   FiO2  40 %   I Time/ I Time % 0.9 s   Minute Volume (L/min) 7 Liters   Mask Leak (lpm) 37 lpm   Patient's Home Machine No       
   01/12/25 2331   NIV Type   NIV Started/Stopped On   Equipment Type v60   Mode AVAPS   Mask Type Full face mask   Mask Size Small   Assessment   Pulse 76   Respirations 28   Settings/Measurements   CPAP/EPAP 5 cmH2O   IPAP Min 10 cmH2O   IPAP Max 20 cmH2O   Vt (Set, mL) 500 mL   Vt (Measured) 683 mL   Rate Ordered 14   FiO2  40 %   I Time/ I Time % 0.85 s   Minute Volume (L/min) 10 Liters   Mask Leak (lpm) 23 lpm   Patient's Home Machine No   Alarm Settings   Alarms On Y   Low Pressure (cmH2O) 5 cmH2O   High Pressure (cmH2O) 40 cmH2O   Delay Alarm 20 sec(s)   RR Low (bpm) 14   RR High (bpm) 40 br/min       
   01/13/25 0310   NIV Type   Equipment Type v60   Mode AVAPS   Mask Type Full face mask   Mask Size Small   Assessment   Pulse 84   Respirations 25   Settings/Measurements   CPAP/EPAP 5 cmH2O   IPAP Min 10 cmH2O   IPAP Max 20 cmH2O   Vt (Set, mL) 500 mL   Vt (Measured) 365 mL   Rate Ordered 14   FiO2  40 %   Minute Volume (L/min) 13 Liters   Mask Leak (lpm) 5 lpm   Patient's Home Machine No   Patient Observation   Patient Observations spo2 100% on 40% bipap       
   01/13/25 0801   NIV Type   NIV Started/Stopped On   Equipment Type v60   Mode AVAPS   Mask Type Full face mask   Mask Size Small   Bonnet size Small   Assessment   Pulse 90   Respirations 24   SpO2 95 %   Level of Consciousness 1   Comfort Level Good   Using Accessory Muscles No   Mask Compliance Good   Skin Assessment Clean, dry, & intact   Skin Protection for O2 Device Yes   Breath Sounds   Breath Sounds Bilateral Diminished   Settings/Measurements   PIP Observed 22 cm H20   CPAP/EPAP 5 cmH2O   IPAP Min 10 cmH2O   IPAP Max 20 cmH2O   Vt (Set, mL) 500 mL   Vt (Measured) 470 mL   Rate Ordered 14   FiO2  40 %   Minute Volume (L/min) 9.5 Liters   Mask Leak (lpm) 0 lpm   Patient's Home Machine No   Alarm Settings   Alarms On Y   Low Pressure (cmH2O) 5 cmH2O   High Pressure (cmH2O) 40 cmH2O   Apnea (secs) 20 secs   RR Low (bpm) 14   RR High (bpm) 40 br/min   Oxygen Therapy/Pulse Ox   O2 Device PAP (positive airway pressure)       
   01/13/25 1512   NIV Type   NIV Started/Stopped On   Equipment Type v60   Mode AVAPS   Mask Type Full face mask   Mask Size Small   Bonnet size Small   Assessment   Pulse 88   Respirations 24   SpO2 100 %   Skin Assessment Clean, dry, & intact   Breath Sounds   Breath Sounds Bilateral Diminished   Settings/Measurements   PIP Observed 23 cm H20   CPAP/EPAP 5 cmH2O   IPAP Min 10 cmH2O   IPAP Max 20 cmH2O   Vt (Set, mL) 500 mL   Vt (Measured) 338 mL   Rate Ordered 14   FiO2  40 %   Minute Volume (L/min) 9.1 Liters   Mask Leak (lpm) 0 lpm   Patient's Home Machine No   Alarm Settings   Alarms On Y   Oxygen Therapy/Pulse Ox   O2 Device PAP (positive airway pressure)       
   01/13/25 2102   NIV Type   NIV Started/Stopped On   Equipment Type V60   Mode AVAPS   Mask Type Full face mask   Mask Size Small   Bonnet size Small   Assessment   Level of Consciousness 0   Comfort Level Good   Using Accessory Muscles No   Mask Compliance Good   Skin Assessment Clean, dry, & intact   Skin Protection for O2 Device Yes   Orientation Middle   Location Nose   Intervention(s) Skin Barrier   Settings/Measurements   PIP Observed 22 cm H20   CPAP/EPAP 5 cmH2O   IPAP Min 10 cmH2O   IPAP Max 20 cmH2O   Vt (Set, mL) 500 mL   Vt (Measured) 459 mL   Rate Ordered 14   FiO2  40 %   I Time/ I Time % 0.85 s   Minute Volume (L/min) 10.2 Liters   Mask Leak (lpm) 4 lpm   Patient's Home Machine No   Alarm Settings   Alarms On Y   High Pressure (cmH2O) 0 cmH2O   RR Low (bpm) 14   RR High (bpm) 40 br/min   Oxygen Therapy/Pulse Ox   O2 Therapy Oxygen humidified   O2 Device (S)  PAP (positive airway pressure)   Pulse Oximeter Device Mode Intermittent   Pulse Oximeter Device Location Finger       
   01/13/25 2330   NIV Type   NIV Started/Stopped On   Equipment Type v60   Mode AVAPS   Mask Type Full face mask   Mask Size Small   Assessment   Pulse 91   Respirations 25   SpO2 94 %   Comfort Level Good   Using Accessory Muscles No   Mask Compliance Good   Skin Assessment Clean, dry, & intact   Skin Protection for O2 Device Yes   Orientation Middle   Location Nose   Intervention(s) Skin Barrier   Breath Sounds   Respiratory Pattern Tachypneic   Right Upper Lobe Diminished   Right Middle Lobe Diminished   Right Lower Lobe Diminished   Left Upper Lobe Diminished   Left Lower Lobe Diminished   Settings/Measurements   PIP Observed 22 cm H20   CPAP/EPAP 5 cmH2O   IPAP Min 10 cmH2O   IPAP Max 20 cmH2O   Vt (Set, mL) 500 mL   Vt (Measured) 534 mL   Rate Ordered 14   Insp Rise Time (%) 1 %   FiO2  40 %   I Time/ I Time % 0.85 s   Minute Volume (L/min) 13.8 Liters   Mask Leak (lpm) 34 lpm   Patient's Home Machine No   Alarm Settings   Alarms On Y   Low Pressure (cmH2O) 5 cmH2O   High Pressure (cmH2O) 40 cmH2O   Apnea (secs) 20 secs   RR Low (bpm) 14   RR High (bpm) 40 br/min       
   01/14/25 1130   NIV Type   Equipment Type v60   Mode AVAPS   Mask Type Full face mask   Mask Size Small   Bonnet size Small   Assessment   Pulse 83   Respirations 28   SpO2 100 %   Level of Consciousness 0   Comfort Level Good   Using Accessory Muscles No   Mask Compliance Good   Skin Assessment Clean, dry, & intact   Skin Protection for O2 Device Yes   Breath Sounds   Breath Sounds Bilateral Diminished   Settings/Measurements   PIP Observed 16 cm H20   CPAP/EPAP 5 cmH2O   IPAP Min 10 cmH2O   IPAP Max 20 cmH2O   Vt (Set, mL) 500 mL   Vt (Measured) 429 mL   Rate Ordered 14   FiO2  40 %   Minute Volume (L/min) 11.1 Liters   Mask Leak (lpm) 0 lpm   Patient's Home Machine No   Alarm Settings   Alarms On Y   Oxygen Therapy/Pulse Ox   Blood Gas  Performed? Yes   $ABG $Arterial Puncture   Christian's Test #1 Pos   Site #1 Left Radial   Site Prepped #1 Yes   Number of Attempts #1 1   Pressure Held #1 Yes   Complications #1 None   Post-procedure #1 Standard   Specimen Status #1 To lab   How Tolerated? Tolerated well       
   01/14/25 1546   NIV Type   Equipment Type v60   Mode AVAPS   Mask Type Full face mask   Mask Size Small   Bonnet size Small   Assessment   Pulse 95   Respirations 28   SpO2 100 %   Level of Consciousness 1   Comfort Level Good   Using Accessory Muscles No   Mask Compliance Good   Skin Assessment Clean, dry, & intact   Skin Protection for O2 Device Yes   Breath Sounds   Breath Sounds Bilateral Expiratory wheezing   Settings/Measurements   PIP Observed 22 cm H20   CPAP/EPAP 5 cmH2O   IPAP Min 10 cmH2O   IPAP Max 20 cmH2O   Vt (Set, mL) 500 mL   Vt (Measured) 496 mL   Rate Ordered 14   FiO2  40 %   Minute Volume (L/min) 14.3 Liters   Mask Leak (lpm) 2 lpm   Patient's Home Machine No   Alarm Settings   Alarms On Y       
   01/14/25 2009   NIV Type   NIV Started/Stopped On   Equipment Type V60   Mode AVAPS   Mask Type Full face mask   Mask Size Small   Assessment   Pulse (!) 102   Respirations 18   SpO2 100 %   Level of Consciousness 1   Comfort Level Good   Using Accessory Muscles No   Mask Compliance Good   Skin Assessment Clean, dry, & intact   Skin Protection for O2 Device Yes   Orientation Middle   Location Nose   Intervention(s) Skin Barrier   Settings/Measurements   PIP Observed 15 cm H20   CPAP/EPAP 5 cmH2O   IPAP Min 10 cmH2O   IPAP Max 20 cmH2O   Vt (Set, mL) 500 mL   Vt (Measured) 529 mL   Rate Ordered 14   Insp Rise Time (%) 1 %   FiO2  40 %   I Time/ I Time % 0.85 s   Minute Volume (L/min) 12.8 Liters   Mask Leak (lpm) 10 lpm   Patient's Home Machine No   Alarm Settings   Alarms On Y   Low Pressure (cmH2O) 5 cmH2O   High Pressure (cmH2O) 40 cmH2O   RR Low (bpm) 14   RR High (bpm) 40 br/min   Oxygen Therapy/Pulse Ox   O2 Therapy Oxygen humidified   O2 Device PAP (positive airway pressure)   Pulse Oximeter Device Mode Continuous   Pulse Oximeter Device Location Finger       
   01/14/25 2321   NIV Type   NIV Started/Stopped On   Equipment Type V60   Mode AVAPS   Mask Type Full face mask   Mask Size Small   Assessment   Pulse (!) 103   Respirations 30   SpO2 98 %   Level of Consciousness 1   Comfort Level Good   Using Accessory Muscles No   Mask Compliance Good   Skin Assessment Clean, dry, & intact   Skin Protection for O2 Device Yes   Orientation Middle   Location Nose   Intervention(s) Skin Barrier   Settings/Measurements   PIP Observed 15 cm H20   CPAP/EPAP 5 cmH2O   IPAP Min 10 cmH2O   IPAP Max 20 cmH2O   Vt (Set, mL) 500 mL   Vt (Measured) 478 mL   Rate Ordered 14   Insp Rise Time (%) 1 %   FiO2  40 %   I Time/ I Time % 0.85 s   Minute Volume (L/min) 11.9 Liters   Mask Leak (lpm) 22 lpm   Patient's Home Machine No   Alarm Settings   Alarms On Y   Low Pressure (cmH2O) 5 cmH2O   High Pressure (cmH2O) 40 cmH2O   RR Low (bpm) 14   RR High (bpm) 40 br/min   Oxygen Therapy/Pulse Ox   O2 Therapy Oxygen humidified   O2 Device PAP (positive airway pressure)   Pulse Oximeter Device Mode Continuous   Pulse Oximeter Device Location Finger       
   01/15/25 0310   NIV Type   NIV Started/Stopped On   Equipment Type V60   Mode AVAPS   Mask Type Full face mask   Mask Size Small   Assessment   Pulse 100   Respirations 28   SpO2 100 %   Level of Consciousness 0   Comfort Level Good   Using Accessory Muscles No   Mask Compliance Good   Skin Assessment Clean, dry, & intact   Skin Protection for O2 Device Yes   Orientation Middle   Location Nose   Intervention(s) Skin Barrier   Settings/Measurements   PIP Observed 15 cm H20   CPAP/EPAP 5 cmH2O   IPAP Min 10 cmH2O   IPAP Max 20 cmH2O   Vt (Set, mL) 500 mL   Vt (Measured) 523 mL   Rate Ordered 14   Insp Rise Time (%) 1 %   FiO2  40 %   I Time/ I Time % 0.85 s   Minute Volume (L/min) 12 Liters   Mask Leak (lpm) 10 lpm   Patient's Home Machine No   Alarm Settings   Alarms On Y   Low Pressure (cmH2O) 5 cmH2O   High Pressure (cmH2O) 40 cmH2O   RR Low (bpm) 14   RR High (bpm) 40 br/min   Oxygen Therapy/Pulse Ox   O2 Therapy Oxygen humidified   O2 Device PAP (positive airway pressure)   Pulse Oximeter Device Mode Continuous   Pulse Oximeter Device Location Finger       
   01/15/25 0700   NIV Type   NIV Started/Stopped On   Equipment Type v60   Mode AVAPS   Mask Type Full face mask   Mask Size Small   Bonnet size Small   Assessment   Pulse (!) 102   Respirations 24   BP (!) 158/85   SpO2 100 %   Level of Consciousness 0   Comfort Level Good   Using Accessory Muscles No   Mask Compliance Good   Skin Assessment Clean, dry, & intact   Skin Protection for O2 Device Yes   Location Nose   Breath Sounds   Breath Sounds Bilateral Diminished   Settings/Measurements   PIP Observed 24 cm H20   CPAP/EPAP 5 cmH2O   IPAP Min 10 cmH2O   IPAP Max 20 cmH2O   Vt (Set, mL) 500 mL   Vt (Measured) 836 mL   Rate Ordered 14   FiO2  35 %   Minute Volume (L/min) 14.2 Liters   Mask Leak (lpm) 21 lpm   Patient's Home Machine No   Alarm Settings   Alarms On Y   Low Pressure (cmH2O) 5 cmH2O   High Pressure (cmH2O) 40 cmH2O   Apnea (secs) 20 secs   RR Low (bpm) 14   RR High (bpm) 40 br/min   Patient Observation   Patient Observations mouth rinsed   Oxygen Therapy/Pulse Ox   O2 Device PAP (positive airway pressure)       
   01/15/25 1955   NIV Type   NIV Started/Stopped On   Equipment Type V60   Mode AVAPS   Mask Type Full face mask   Mask Size Small   Assessment   Pulse 75   Respirations 26   SpO2 (!) 82 %   Level of Consciousness 0   Comfort Level Good   Using Accessory Muscles No   Mask Compliance Good   Skin Assessment Clean, dry, & intact   Skin Protection for O2 Device Yes   Orientation Middle   Location Nose   Intervention(s) Skin Barrier   Settings/Measurements   PIP Observed 19 cm H20   CPAP/EPAP 5 cmH2O   IPAP Min 10 cmH2O   IPAP Max 20 cmH2O   Vt (Set, mL) 500 mL   Vt (Measured) 639 mL   Rate Ordered 18   Insp Rise Time (%) 1 %   FiO2  40 %   I Time/ I Time % 0.85 s   Minute Volume (L/min) 11.1 Liters   Mask Leak (lpm) 43 lpm   Patient's Home Machine No   Alarm Settings   Alarms On Y   Low Pressure (cmH2O) 5 cmH2O   High Pressure (cmH2O) 40 cmH2O   RR Low (bpm) 14   RR High (bpm) 40 br/min   Oxygen Therapy/Pulse Ox   O2 Therapy Oxygen humidified   O2 Device PAP (positive airway pressure)   Pulse Oximeter Device Mode Continuous   Pulse Oximeter Device Location Finger       
   01/15/25 2328   NIV Type   NIV Started/Stopped On   Equipment Type V60   Mode AVAPS   Mask Type Full face mask   Mask Size Small   Assessment   Pulse 61   Respirations 17   SpO2 97 %   Level of Consciousness 0   Comfort Level Fair   Using Accessory Muscles No   Mask Compliance Good   Skin Assessment Clean, dry, & intact   Skin Protection for O2 Device Yes   Orientation Middle   Location Nose   Intervention(s) Skin Barrier   Settings/Measurements   PIP Observed 18 cm H20   CPAP/EPAP 5 cmH2O   IPAP Min 10 cmH2O   IPAP Max 20 cmH2O   Vt (Set, mL) 500 mL   Vt (Measured) 526 mL   Rate Ordered 18   Insp Rise Time (%) 1 %   FiO2  40 %   I Time/ I Time % 0.85 s   Minute Volume (L/min) 9.8 Liters   Mask Leak (lpm) 31 lpm   Patient's Home Machine No   Alarm Settings   Alarms On Y   Low Pressure (cmH2O) 5 cmH2O   High Pressure (cmH2O) 40 cmH2O   RR Low (bpm) 14   RR High (bpm) 40 br/min   Oxygen Therapy/Pulse Ox   O2 Therapy Oxygen humidified   O2 Device PAP (positive airway pressure)   Pulse Oximeter Device Mode Continuous   Pulse Oximeter Device Location Finger       
   01/16/25 0326   NIV Type   NIV Started/Stopped On   Equipment Type V60   Mode AVAPS   Mask Type Full face mask   Mask Size Small   Assessment   Pulse 62   Respirations 19   SpO2 100 %   Level of Consciousness 0   Comfort Level Good   Using Accessory Muscles No   Mask Compliance Good   Skin Assessment Clean, dry, & intact   Skin Protection for O2 Device Yes   Orientation Middle   Location Nose   Intervention(s) Skin Barrier   Settings/Measurements   PIP Observed 19 cm H20   CPAP/EPAP 5 cmH2O   IPAP Min 10 cmH2O   IPAP Max 20 cmH2O   Vt (Set, mL) 500 mL   Vt (Measured) 548 mL   Rate Ordered 14   FiO2  40 %   I Time/ I Time % 0.85 s   Minute Volume (L/min) 10.5 Liters   Mask Leak (lpm) 15 lpm   Patient's Home Machine No   Alarm Settings   Alarms On Y   Low Pressure (cmH2O) 5 cmH2O   High Pressure (cmH2O) 40 cmH2O   RR Low (bpm) 14   RR High (bpm) 40 br/min   Oxygen Therapy/Pulse Ox   O2 Therapy Oxygen humidified   O2 Device PAP (positive airway pressure)   Pulse Oximeter Device Mode Continuous   Pulse Oximeter Device Location Finger       
   01/16/25 0326   NIV Type   NIV Started/Stopped On   Equipment Type V60   Mode AVAPS   Mask Type Full face mask   Mask Size Small   Assessment   Pulse 62   Respirations 19   SpO2 100 %   Level of Consciousness 0   Comfort Level Good   Using Accessory Muscles No   Mask Compliance Good   Skin Assessment Clean, dry, & intact   Skin Protection for O2 Device Yes   Orientation Middle   Location Nose   Intervention(s) Skin Barrier   Settings/Measurements   PIP Observed 19 cm H20   CPAP/EPAP 5 cmH2O   IPAP Min 10 cmH2O   IPAP Max 20 cmH2O   Vt (Set, mL) 500 mL   Vt (Measured) 548 mL   Rate Ordered 14   FiO2  40 %   I Time/ I Time % 0.85 s   Minute Volume (L/min) 10.5 Liters   Mask Leak (lpm) 15 lpm   Patient's Home Machine No   Alarm Settings   Alarms On Y   Low Pressure (cmH2O) 5 cmH2O   High Pressure (cmH2O) 40 cmH2O   RR Low (bpm) 14   RR High (bpm) 40 br/min   Oxygen Therapy/Pulse Ox   O2 Therapy Oxygen humidified   O2 Device PAP (positive airway pressure)   Pulse Oximeter Device Mode Continuous   Pulse Oximeter Device Location Finger       
   01/16/25 2005   NIV Type   NIV Started/Stopped On   Equipment Type v60   Mode AVAPS   Mask Type Full face mask   Mask Size Small   Bonnet size Small   Assessment   Pulse 94   Respirations 18   SpO2 99 %   Skin Protection for O2 Device Yes   Orientation Middle   Location Nose   Breath Sounds   Respiratory Pattern Tachypneic   Breath Sounds Bilateral Diminished   Settings/Measurements   PIP Observed 19 cm H20   CPAP/EPAP 5 cmH2O   IPAP Min 10 cmH2O   IPAP Max 20 cmH2O   Vt (Set, mL) 500 mL   Vt (Measured) 400 mL   Rate Ordered 14   Insp Rise Time (%) 1 %   FiO2  40 %   Minute Volume (L/min) 11.2 Liters   Mask Leak (lpm) 0 lpm   Patient's Home Machine No   Alarm Settings   Alarms On Y   Low Pressure (cmH2O) 5 cmH2O   High Pressure (cmH2O) 40 cmH2O   Apnea (secs) 20 secs   RR Low (bpm) 14   RR High (bpm) 40 br/min   Patient Observation   Patient Observations pt tolerated tx okay. placing patient back on bipap after tx.       
   01/16/25 2322   NIV Type   NIV Started/Stopped On   Mode AVAPS   Mask Type Full face mask   Mask Size Small   Assessment   Pulse 77   Respirations 20   SpO2 99 %   Breath Sounds   Upper Airway Sounds Coarse   Settings/Measurements   PIP Observed 18 cm H20   CPAP/EPAP 5 cmH2O   IPAP Min 10 cmH2O   IPAP Max 20 cmH2O   Vt (Set, mL) 500 mL   Vt (Measured) 498 mL   Rate Ordered 14   Insp Rise Time (%) 1 %   FiO2  40 %   I Time/ I Time % 0.85 s   Minute Volume (L/min) 11.2 Liters   Mask Leak (lpm) 16 lpm   Alarm Settings   Alarms On Y   Low Pressure (cmH2O) 5 cmH2O   High Pressure (cmH2O) 40 cmH2O   Apnea (secs) 20 secs   RR Low (bpm) 14   RR High (bpm) 40 br/min   Patient Observation   Patient Observations pt resting comfrortably.       
   01/17/25 0316   NIV Type   $NIV $Daily Charge   NIV Started/Stopped On   Equipment Type v60   Mode AVAPS   Mask Type Full face mask   Mask Size Small   Assessment   Pulse 83   Respirations 18   SpO2 99 %   Settings/Measurements   CPAP/EPAP 5 cmH2O   IPAP Min 10 cmH2O   IPAP Max 20 cmH2O   Vt (Set, mL) 500 mL   Vt (Measured) 565 mL   Rate Ordered 14   FiO2  40 %   I Time/ I Time % 0.85 s   Minute Volume (L/min) 9 Liters   Mask Leak (lpm) 3 lpm   Patient's Home Machine No       
   01/17/25 1900   RT Protocol   History Pulmonary Disease 2   Respiratory pattern 6   Breath sounds 2   Cough 4   Indications for Bronchodilator Therapy Decreased or absent breath sounds   Bronchodilator Assessment Score 14     RT Inhaler-Nebulizer Bronchodilator Protocol Note    There is a bronchodilator order in the chart from a provider indicating to follow the RT Bronchodilator Protocol and there is an “Initiate RT Inhaler-Nebulizer Bronchodilator Protocol” order as well (see protocol at bottom of note).    CXR Findings:  XR CHEST PORTABLE    Result Date: 1/16/2025  No radiographic evidence of complication following central venous catheter placement.       The findings from the last RT Protocol Assessment were as follows:   History Pulmonary Disease: Chronic pulmonary disease  Respiratory Pattern: Use of accessory muscles, prolonged exhalation, or RR 26-30 bpm  Breath Sounds: Slightly diminished and/or crackles  Cough: Unable to generate effective cough  Indication for Bronchodilator Therapy: Decreased or absent breath sounds  Bronchodilator Assessment Score: 14    Aerosolized bronchodilator medication orders have been revised according to the RT Inhaler-Nebulizer Bronchodilator Protocol below.    Respiratory Therapist to perform RT Therapy Protocol Assessment initially then follow the protocol.  Repeat RT Therapy Protocol Assessment PRN for score 0-3 or on second treatment, BID, and PRN for scores above 3.    No Indications - adjust the frequency to every 6 hours PRN wheezing or bronchospasm, if no treatments needed after 48 hours then discontinue using Per Protocol order mode.     If indication present, adjust the RT bronchodilator orders based on the Bronchodilator Assessment Score as indicated below.  Use Inhaler orders unless patient has one or more of the following: on home nebulizer, not able to hold breath for 10 seconds, is not alert and oriented, cannot activate and use MDI correctly, or respiratory 
   01/17/25 2336   NIV Type   Equipment Type v60   Mode AVAPS   Mask Type Full face mask   Mask Size Small   Assessment   Pulse 91   Respirations (!) 33   SpO2 92 %   Settings/Measurements   CPAP/EPAP 5 cmH2O   IPAP Min 10 cmH2O   IPAP Max 20 cmH2O   Vt (Set, mL) 500 mL   Vt (Measured) 350 mL   Rate Ordered 14   FiO2  40 %   Minute Volume (L/min) 11.5 Liters   Mask Leak (lpm) 16 lpm   Patient's Home Machine No   Patient Observation   Patient Observations spo2 99% on 40% avaps       
   01/17/25 2336   NIV Type   Equipment Type v60   Mode AVAPS   Mask Type Full face mask   Mask Size Small   Assessment   Pulse 91   Respirations (!) 33   SpO2 92 %   Settings/Measurements   CPAP/EPAP 5 cmH2O   IPAP Min 10 cmH2O   IPAP Max 20 cmH2O   Vt (Set, mL) 500 mL   Vt (Measured) 411 mL   Rate Ordered 14   FiO2  40 %   Minute Volume (L/min) 11.5 Liters   Mask Leak (lpm) 16 lpm   Patient's Home Machine No   Patient Observation   Patient Observations spo2 99% on 40% avaps       
   01/18/25 1900   RT Protocol   History Pulmonary Disease 2   Respiratory pattern 2   Breath sounds 2   Cough 3   Indications for Bronchodilator Therapy Decreased or absent breath sounds   Bronchodilator Assessment Score 9     RT Inhaler-Nebulizer Bronchodilator Protocol Note    There is a bronchodilator order in the chart from a provider indicating to follow the RT Bronchodilator Protocol and there is an “Initiate RT Inhaler-Nebulizer Bronchodilator Protocol” order as well (see protocol at bottom of note).    CXR Findings:  No results found.    The findings from the last RT Protocol Assessment were as follows:   History Pulmonary Disease: Chronic pulmonary disease  Respiratory Pattern: Dyspnea on exertion or RR 21-25 bpm  Breath Sounds: Slightly diminished and/or crackles  Cough: Weak, non-productive  Indication for Bronchodilator Therapy: Decreased or absent breath sounds  Bronchodilator Assessment Score: 9    Aerosolized bronchodilator medication orders have been revised according to the RT Inhaler-Nebulizer Bronchodilator Protocol below.    Respiratory Therapist to perform RT Therapy Protocol Assessment initially then follow the protocol.  Repeat RT Therapy Protocol Assessment PRN for score 0-3 or on second treatment, BID, and PRN for scores above 3.    No Indications - adjust the frequency to every 6 hours PRN wheezing or bronchospasm, if no treatments needed after 48 hours then discontinue using Per Protocol order mode.     If indication present, adjust the RT bronchodilator orders based on the Bronchodilator Assessment Score as indicated below.  Use Inhaler orders unless patient has one or more of the following: on home nebulizer, not able to hold breath for 10 seconds, is not alert and oriented, cannot activate and use MDI correctly, or respiratory rate 25 breaths per minute or more, then use the equivalent nebulizer order(s) with same Frequency and PRN reasons based on the score.  If a patient is on this 
   01/18/25 2323   NIV Type   Equipment Type v60   Mode AVAPS   Mask Type Full face mask   Mask Size Small   Settings/Measurements   CPAP/EPAP 5 cmH2O   IPAP Min 10 cmH2O   IPAP Max 20 cmH2O   Vt (Set, mL) 380 mL   Vt (Measured) 378 mL   Rate Ordered 14   FiO2  40 %   Minute Volume (L/min) 10 Liters   Mask Leak (lpm) 2 lpm   Patient's Home Machine No   Patient Observation   Patient Observations spo2 100% on 40% avaps       
   01/19/25 0327   NIV Type   Equipment Type v60   Mode AVAPS   Mask Type Full face mask   Mask Size Small   Assessment   Pulse 66   Respirations 18   Settings/Measurements   CPAP/EPAP 5 cmH2O   IPAP Min 10 cmH2O   IPAP Max 20 cmH2O   Vt (Set, mL) 380 mL   Vt (Measured) 357 mL   Rate Ordered 14   FiO2  40 %   Minute Volume (L/min) 6.7 Liters   Mask Leak (lpm) 27 lpm   Patient's Home Machine No   Patient Observation   Patient Observations spo2 100% on 40% avaps       
   01/19/25 1156   NIV Type   NIV Started/Stopped On   Equipment Type v60   Mode AVAPS   Mask Type Full face mask   Mask Size Small   Bonnet size Small   Assessment   Pulse 72   Respirations 21   BP (!) 137/51   SpO2 98 %   Level of Consciousness 1   Comfort Level Good   Using Accessory Muscles No   Mask Compliance Good   Skin Assessment Clean, dry, & intact   Skin Protection for O2 Device Yes   Breath Sounds   Breath Sounds Bilateral Diminished   Settings/Measurements   PIP Observed 18 cm H20   CPAP/EPAP 5 cmH2O   IPAP Min 10 cmH2O   IPAP Max 20 cmH2O   Vt (Set, mL) 380 mL   Vt (Measured) 384 mL   Rate Ordered 14   FiO2  40 %   Minute Volume (L/min) 7 Liters   Mask Leak (lpm) 0 lpm   Patient's Home Machine No   Alarm Settings   Alarms On Y       
   01/19/25 1900   RT Protocol   History Pulmonary Disease 2   Respiratory pattern 2   Breath sounds 2   Cough 0   Indications for Bronchodilator Therapy Decreased or absent breath sounds   Bronchodilator Assessment Score 6     RT Inhaler-Nebulizer Bronchodilator Protocol Note    There is a bronchodilator order in the chart from a provider indicating to follow the RT Bronchodilator Protocol and there is an “Initiate RT Inhaler-Nebulizer Bronchodilator Protocol” order as well (see protocol at bottom of note).    CXR Findings:  XR CHEST PORTABLE    Result Date: 1/19/2025  1. Endotracheal tube tip is 4.4 cm above the brayden. 2. Enteric tube courses below the diaphragm. The proximal side port of the enteric tube is at the GE junction.  Consider advancement. 3. Small right pleural effusion with increasing airspace opacity in the right mid to lower lung.       The findings from the last RT Protocol Assessment were as follows:   History Pulmonary Disease: Chronic pulmonary disease  Respiratory Pattern: Dyspnea on exertion or RR 21-25 bpm  Breath Sounds: Slightly diminished and/or crackles  Cough: Strong, spontaneous, non-productive  Indication for Bronchodilator Therapy: Decreased or absent breath sounds  Bronchodilator Assessment Score: 6    Aerosolized bronchodilator medication orders have been revised according to the RT Inhaler-Nebulizer Bronchodilator Protocol below.    Respiratory Therapist to perform RT Therapy Protocol Assessment initially then follow the protocol.  Repeat RT Therapy Protocol Assessment PRN for score 0-3 or on second treatment, BID, and PRN for scores above 3.    No Indications - adjust the frequency to every 6 hours PRN wheezing or bronchospasm, if no treatments needed after 48 hours then discontinue using Per Protocol order mode.     If indication present, adjust the RT bronchodilator orders based on the Bronchodilator Assessment Score as indicated below.  Use Inhaler orders unless patient has 
   01/19/25 1954   Patient Observation   Pulse 78   Respirations 20   Patient Observations 7.5 ett 24 at lip   Breath Sounds   Right Upper Lobe Diminished   Right Middle Lobe Diminished   Right Lower Lobe Diminished   Left Upper Lobe Diminished   Left Lower Lobe Diminished   Ventilator Settings   Vt (Set, mL) 350 mL   FiO2  30 %   Resp Rate (Set) 20 bpm   PEEP/CPAP (cmH2O) 5   Vent Patient Data (Readings)   Vt (Measured) 360 mL   Peak Inspiratory Pressure (cmH2O) 31 cmH2O   Rate Measured 20 br/min   Minute Volume (L/min) 7.1 Liters   Mean Airway Pressure (cmH2O) 10 cmH20   I:E Ratio 1:3.7   Flow Sensitivity 3 L/min   Static Compliance (L/cm H2O) 30   Dynamic Compliance (L/cm H2O) 13 L/cm H2O   Vent Alarm Settings   High Pressure (cmH2O) 40 cmH2O   Low Minute Volume (lpm) 2 L/min   Low Exhaled Vt (ml) 210 mL   RR High (bpm) 40 br/min   Apnea (secs) 20 secs   Airway Clearance   Suction ET Tube   Suction Device Inline suction catheter   Sputum Method Obtained Endotracheal   Sputum Color/Odor White;Yellow   Sputum Consistency Thick   ETT    Placement Date/Time: 01/19/25 1300   Airway Tube Size: 7.5 mm   Secured At 24 cm   Measured From Lips   ETT Placement Left   Secured By Commercial tube miller       
   01/19/25 2301   Patient Observation   Pulse 97   Respirations 26   Patient Observations 7.5 ett 24 at lip   Breath Sounds   Right Upper Lobe Diminished   Right Middle Lobe Diminished   Right Lower Lobe Diminished   Left Upper Lobe Diminished   Left Lower Lobe Diminished   Vent Information   Vent Mode AC/VC   Ventilator Settings   Vt (Set, mL) 350 mL   FiO2  30 %   Resp Rate (Set) 20 bpm   PEEP/CPAP (cmH2O) 5   Vent Patient Data (Readings)   Vt (Measured) 375 mL   Peak Inspiratory Pressure (cmH2O) 26 cmH2O   Rate Measured 24 br/min   Minute Volume (L/min) 8.8 Liters   Mean Airway Pressure (cmH2O) 10 cmH20   I:E Ratio 1:2.7   Flow Sensitivity 3 L/min   Vent Alarm Settings   High Pressure (cmH2O) 40 cmH2O   Low Minute Volume (lpm) 2 L/min   Low Exhaled Vt (ml) 210 mL   RR High (bpm) 40 br/min   Apnea (secs) 20 secs   Airway Clearance   Suction ET Tube   Suction Device Inline suction catheter   Sputum Method Obtained Endotracheal   Sputum Amount Moderate   Sputum Color/Odor Yellow   Sputum Consistency Thick   ETT    Placement Date/Time: 01/19/25 1300   Airway Tube Size: 7.5 mm   Secured At 24 cm   Measured From Lips   ETT Placement Right   Secured By Commercial tube miller       
   01/20/25 0320   Patient Observation   Pulse (!) 101   Respirations 24   Patient Observations 7.5 ett 24 at lip   Breath Sounds   Right Upper Lobe Diminished   Right Middle Lobe Diminished   Right Lower Lobe Diminished   Left Upper Lobe Diminished   Left Lower Lobe Diminished   Vent Information   Vent Mode AC/VC   Ventilator Settings   Vt (Set, mL) 350 mL   FiO2  30 %   Resp Rate (Set) 20 bpm   PEEP/CPAP (cmH2O) 5   Vent Patient Data (Readings)   Vt (Measured) 367 mL   Peak Inspiratory Pressure (cmH2O) 30 cmH2O   Rate Measured 25 br/min   Minute Volume (L/min) 8.8 Liters   Mean Airway Pressure (cmH2O) 11 cmH20   I:E Ratio 1:2.3   Flow Sensitivity 3 L/min   Vent Alarm Settings   High Pressure (cmH2O) 40 cmH2O   Low Minute Volume (lpm) 2 L/min   Low Exhaled Vt (ml) 210 mL   RR High (bpm) 40 br/min   Apnea (secs) 20 secs   Airway Clearance   Suction ET Tube   Suction Device Inline suction catheter   Sputum Method Obtained Endotracheal   Sputum Amount Moderate   Sputum Color/Odor Yellow   Sputum Consistency Thick       
   01/20/25 1937   Patient Observation   Pulse 90   Respirations 20   Breath Sounds   Right Upper Lobe Diminished   Right Middle Lobe Diminished   Right Lower Lobe Diminished   Left Upper Lobe Diminished   Left Lower Lobe Diminished   Vent Information   Vent Mode AC/VC   Ventilator Settings   Vt (Set, mL) 350 mL   FiO2  30 %   Resp Rate (Set) 20 bpm   PEEP/CPAP (cmH2O) 5   Vent Patient Data (Readings)   Vt (Measured) 355 mL   Peak Inspiratory Pressure (cmH2O) 28 cmH2O   Rate Measured 20 br/min   Minute Volume (L/min) 7 Liters   Peak Inspiratory Flow (lpm) 60 L/sec   Mean Airway Pressure (cmH2O) 9.3 cmH20   I:E Ratio 1:3.7   Flow Sensitivity 3 L/min   Vent Alarm Settings   High Pressure (cmH2O) 40 cmH2O   Low Minute Volume (lpm) 2 L/min   Low Exhaled Vt (ml) 210 mL   RR High (bpm) 40 br/min   Apnea (secs) 20 secs   Additional Respiratoray Assessments   Humidification Source HME   Airway Clearance   Suction ET Tube   Suction Device Inline suction catheter   Sputum Method Obtained Endotracheal   Sputum Amount Small   Sputum Color/Odor Yellow;White   Sputum Consistency Thick   ETT    Placement Date/Time: 01/19/25 1300   Airway Tube Size: 7.5 mm   Secured At 23 cm   Measured From Lips   ETT Placement Left   Secured By Commercial tube miller   Site Assessment Dry       
   01/20/25 2333   Breath Sounds   Right Upper Lobe Diminished   Right Middle Lobe Diminished   Right Lower Lobe Diminished   Left Upper Lobe Diminished   Left Lower Lobe Diminished   Vent Information   Vent Mode AC/VC   Ventilator Settings   Vt (Set, mL) 350 mL   FiO2  30 %   Resp Rate (Set) 20 bpm   PEEP/CPAP (cmH2O) 5   Vent Patient Data (Readings)   Vt (Measured) 357 mL   Peak Inspiratory Pressure (cmH2O) 28 cmH2O   Rate Measured 20 br/min   Minute Volume (L/min) 7.2 Liters   Peak Inspiratory Flow (lpm) 60 L/sec   Mean Airway Pressure (cmH2O) 9.5 cmH20   I:E Ratio 1:3.7   Flow Sensitivity 3 L/min   Vent Alarm Settings   High Pressure (cmH2O) 40 cmH2O   Low Minute Volume (lpm) 2 L/min   Low Exhaled Vt (ml) 210 mL   RR High (bpm) 40 br/min   Apnea (secs) 20 secs   Additional Respiratoray Assessments   Humidification Source HME   Airway Clearance   Suction ET Tube   Suction Device Inline suction catheter   Sputum Method Obtained Endotracheal   Sputum Amount Scant   ETT    Placement Date/Time: 01/19/25 1300   Airway Tube Size: 7.5 mm   Secured At 23 cm   Measured From Lips   ETT Placement (S)  Right  (moved to right)   Secured By Commercial tube miller   Site Assessment Dry       
   01/21/25 0310   Patient Observation   Pulse 83   Respirations 20   Breath Sounds   Right Upper Lobe Diminished   Right Middle Lobe Diminished   Right Lower Lobe Diminished   Left Upper Lobe Diminished   Left Lower Lobe Diminished   Vent Information   Vent Mode AC/VC   $Ventilation $Subsequent Day   Ventilator Settings   Vt (Set, mL) 350 mL   FiO2  30 %   Resp Rate (Set) 20 bpm   PEEP/CPAP (cmH2O) 5   Vent Patient Data (Readings)   Vt (Measured) 355 mL   Peak Inspiratory Pressure (cmH2O) 31 cmH2O   Rate Measured 20 br/min   Minute Volume (L/min) 7 Liters   Peak Inspiratory Flow (lpm) 60 L/sec   Mean Airway Pressure (cmH2O) 10 cmH20   I:E Ratio 1:3.7   Flow Sensitivity 3 L/min   Vent Alarm Settings   High Pressure (cmH2O) 40 cmH2O   Low Minute Volume (lpm) 2 L/min   Low Exhaled Vt (ml) 210 mL   RR High (bpm) 40 br/min   Apnea (secs) 20 secs   Additional Respiratoray Assessments   Humidification Source HME   ETT    Placement Date/Time: 01/19/25 1300   Airway Tube Size: 7.5 mm   Secured At 23 cm   Measured From Lips   ETT Placement Right   Secured By Commercial tube miller   Site Assessment Dry       
   01/21/25 0751   Vent Information   Vent Mode CPAP/PS   Ventilator Settings   FiO2  30 %   PEEP/CPAP (cmH2O) 5   Pressure Support (cm H2O) 5 cm H2O   Vent Patient Data (Readings)   Vt (Measured) 307 mL   Peak Inspiratory Pressure (cmH2O) 11 cmH2O   Rate Measured 25 br/min   Minute Volume (L/min) 7.6 Liters   Peak Inspiratory Flow (lpm) 60 L/sec   Mean Airway Pressure (cmH2O) 7 cmH20   I:E Ratio 1:2.2       
   01/21/25 1958   Patient Observation   Pulse 99   Respirations (!) 31   SpO2 97 %   Breath Sounds   Right Upper Lobe Diminished   Right Middle Lobe Diminished   Right Lower Lobe Diminished   Left Upper Lobe Diminished   Left Lower Lobe Diminished   Vent Information   Vent Mode AC/VC   Ventilator Settings   Vt (Set, mL) 350 mL   FiO2  30 %   Resp Rate (Set) 20 bpm   PEEP/CPAP (cmH2O) 5   Vent Patient Data (Readings)   Vt (Measured) 351 mL   Peak Inspiratory Pressure (cmH2O) 27 cmH2O   Rate Measured 27 br/min   Minute Volume (L/min) 9.5 Liters   Peak Inspiratory Flow (lpm) 60 L/sec   Mean Airway Pressure (cmH2O) 11 cmH20   I:E Ratio 1:2.5   Flow Sensitivity 3 L/min   Vent Alarm Settings   High Pressure (cmH2O) 40 cmH2O   Low Minute Volume (lpm) 2 L/min   Low Exhaled Vt (ml) 210 mL   RR High (bpm) 40 br/min   Apnea (secs) 20 secs   Additional Respiratoray Assessments   Humidification Source HME   Airway Clearance   Suction ET Tube   Suction Device Inline suction catheter   Sputum Method Obtained Endotracheal   Sputum Amount Moderate   Sputum Color/Odor Yellow   Sputum Consistency Thick   ETT    Placement Date/Time: 01/19/25 1300   Airway Tube Size: 7.5 mm   Secured At 23 cm   Measured From Lips   ETT Placement Left   Secured By Commercial tube miller   Site Assessment Dry       
   01/21/25 2346   Patient Observation   Pulse 94   Respirations 20   SpO2 97 %   Breath Sounds   Right Upper Lobe Diminished   Right Middle Lobe Diminished   Right Lower Lobe Diminished   Left Upper Lobe Diminished   Left Lower Lobe Diminished   Vent Information   Vent Mode AC/VC   Ventilator Settings   Vt (Set, mL) 350 mL   FiO2  30 %   Resp Rate (Set) 20 bpm   PEEP/CPAP (cmH2O) 5   Vent Patient Data (Readings)   Vt (Measured) 354 mL   Peak Inspiratory Pressure (cmH2O) 28 cmH2O   Rate Measured 20 br/min   Minute Volume (L/min) 7 Liters   Peak Inspiratory Flow (lpm) 60 L/sec   Mean Airway Pressure (cmH2O) 9.5 cmH20   I:E Ratio 1:3.7   Flow Sensitivity 3 L/min   Vent Alarm Settings   High Pressure (cmH2O) 40 cmH2O   Low Minute Volume (lpm) 2 L/min   Low Exhaled Vt (ml) 210 mL   RR High (bpm) 40 br/min   Apnea (secs) 20 secs   Additional Respiratoray Assessments   Humidification Source HME   ETT    Placement Date/Time: 01/19/25 1300   Airway Tube Size: 7.5 mm   Secured At 23 cm   Measured From Lips   ETT Placement (S)  Right  (moved to right)   Secured By Commercial tube miller   Site Assessment Dry       
   01/22/25 0322   Patient Observation   Pulse 87   Respirations 20   Breath Sounds   Right Upper Lobe Diminished   Right Middle Lobe Diminished   Right Lower Lobe Diminished   Left Upper Lobe Diminished   Left Lower Lobe Diminished   Vent Information   Vent Mode AC/VC   $Ventilation $Subsequent Day   Ventilator Settings   Vt (Set, mL) 350 mL   FiO2  30 %   Resp Rate (Set) 20 bpm   PEEP/CPAP (cmH2O) 5   Vent Patient Data (Readings)   Vt (Measured) 353 mL   Peak Inspiratory Pressure (cmH2O) 34 cmH2O   Rate Measured 20 br/min   Minute Volume (L/min) 7 Liters   Peak Inspiratory Flow (lpm) 60 L/sec   Mean Airway Pressure (cmH2O) 10 cmH20   I:E Ratio 1:3.7   Flow Sensitivity 3 L/min   Vent Alarm Settings   High Pressure (cmH2O) 40 cmH2O   Low Minute Volume (lpm) 2 L/min   Low Exhaled Vt (ml) 210 mL   RR High (bpm) 40 br/min   Apnea (secs) 20 secs   Additional Respiratoray Assessments   Humidification Source HME   Airway Clearance   Suction ET Tube   Suction Device Inline suction catheter   Sputum Method Obtained Endotracheal   Sputum Amount Moderate   Sputum Color/Odor Yellow;White   Sputum Consistency Thick   ETT    Placement Date/Time: 01/19/25 1300   Airway Tube Size: 7.5 mm   Secured At 23 cm   Measured From Lips   ETT Placement Right   Secured By Commercial tube miller   Site Assessment Dry       
   01/22/25 1942   Patient Observation   Pulse 81   Respirations 20   SpO2 95 %   Breath Sounds   Right Upper Lobe Diminished   Right Middle Lobe Diminished   Right Lower Lobe Diminished   Left Upper Lobe Diminished   Left Lower Lobe Diminished   Vent Information   Vent Mode AC/VC   Ventilator Settings   Vt (Set, mL) 350 mL   FiO2  30 %   Resp Rate (Set) 20 bpm   PEEP/CPAP (cmH2O) 5   Vent Patient Data (Readings)   Vt (Measured) 356 mL   Peak Inspiratory Pressure (cmH2O) 25 cmH2O   Rate Measured 21 br/min   Minute Volume (L/min) 7.5 Liters   Peak Inspiratory Flow (lpm) 60 L/sec   Mean Airway Pressure (cmH2O) 9.5 cmH20   I:E Ratio 1:3.7   Flow Sensitivity 3 L/min   Vent Alarm Settings   High Pressure (cmH2O) 40 cmH2O   Low Minute Volume (lpm) 2 L/min   Low Exhaled Vt (ml) 210 mL   RR High (bpm) 40 br/min   Apnea (secs) 20 secs   Additional Respiratoray Assessments   Humidification Source HME   Airway Clearance   Suction ET Tube   Suction Device Inline suction catheter   Sputum Method Obtained Endotracheal   Sputum Amount Moderate   Sputum Color/Odor Yellow   Sputum Consistency Thick   ETT    Placement Date/Time: 01/19/25 1300   Airway Tube Size: 7.5 mm   Secured At 23 cm   Measured From Lips   ETT Placement Left   Secured By Commercial tube miller   Site Assessment Dry       
   01/22/25 2303   Breath Sounds   Right Upper Lobe Diminished   Right Middle Lobe Diminished   Right Lower Lobe Diminished   Left Upper Lobe Diminished   Left Lower Lobe Diminished   Vent Information   Vent Mode AC/VC   Ventilator Settings   Vt (Set, mL) 350 mL   FiO2  30 %   Resp Rate (Set) 20 bpm   PEEP/CPAP (cmH2O) 5   Vent Patient Data (Readings)   Vt (Measured) 355 mL   Peak Inspiratory Pressure (cmH2O) 31 cmH2O   Rate Measured 20 br/min   Minute Volume (L/min) 7 Liters   Peak Inspiratory Flow (lpm) 60 L/sec   Mean Airway Pressure (cmH2O) 10 cmH20   I:E Ratio 1:3.7   Flow Sensitivity 3 L/min   Vent Alarm Settings   High Pressure (cmH2O) 40 cmH2O   Low Minute Volume (lpm) 2 L/min   Low Exhaled Vt (ml) 210 mL   RR High (bpm) 40 br/min   Apnea (secs) 20 secs   Additional Respiratoray Assessments   Humidification Source HME   Airway Clearance   Suction ET Tube   Suction Device Inline suction catheter   Sputum Method Obtained Endotracheal   Sputum Amount Moderate   Sputum Color/Odor Yellow;White   Sputum Consistency Thick   ETT    Placement Date/Time: 01/19/25 1300   Airway Tube Size: 7.5 mm   Secured At 23 cm   Measured From Lips   ETT Placement (S)  Left  (moved to left)   Secured By Commercial tube miller   Site Assessment Dry       
   01/23/25 0313   Patient Observation   Pulse 63   Respirations 20   SpO2 99 %   Breath Sounds   Right Upper Lobe Diminished   Right Middle Lobe Diminished   Right Lower Lobe Diminished   Left Upper Lobe Diminished   Left Lower Lobe Diminished   Vent Information   Vent Mode AC/VC   $Ventilation $Subsequent Day   Ventilator Settings   Vt (Set, mL) 350 mL   FiO2  30 %   Resp Rate (Set) 20 bpm   PEEP/CPAP (cmH2O) 5   Vent Patient Data (Readings)   Vt (Measured) 355 mL   Peak Inspiratory Pressure (cmH2O) 32 cmH2O   Rate Measured 20 br/min   Minute Volume (L/min) 7 Liters   Peak Inspiratory Flow (lpm) 60 L/sec   Mean Airway Pressure (cmH2O) 10 cmH20   I:E Ratio 1:3.7   Flow Sensitivity 3 L/min   Vent Alarm Settings   High Pressure (cmH2O) 40 cmH2O   Low Minute Volume (lpm) 2 L/min   Low Exhaled Vt (ml) 210 mL   RR High (bpm) 40 br/min   Apnea (secs) 20 secs   Additional Respiratoray Assessments   Humidification Source HME   Airway Clearance   Suction ET Tube   Suction Device Inline suction catheter   Sputum Method Obtained Endotracheal   Sputum Amount Moderate   Sputum Color/Odor Yellow   Sputum Consistency Thick   ETT    Placement Date/Time: 01/19/25 1300   Airway Tube Size: 7.5 mm   Secured At 23 cm   Measured From Lips   ETT Placement Left   Secured By Commercial tube miller   Site Assessment Dry       
   01/23/25 2256   NIV Type   NIV Started/Stopped On   Equipment Type V60   Mode AVAPS   Mask Type Full face mask   Mask Size Small   Assessment   Pulse 86   Respirations 26   SpO2 98 %   Level of Consciousness 0   Comfort Level Good   Using Accessory Muscles No   Mask Compliance Good   Skin Assessment Clean, dry, & intact   Skin Protection for O2 Device Yes   Orientation Middle   Location Nose   Intervention(s) Skin Barrier   Settings/Measurements   PIP Observed 13 cm H20   CPAP/EPAP 5 cmH2O   IPAP Min 10 cmH2O   IPAP Max 20 cmH2O   Vt (Measured) 292 mL   Rate Ordered 27   Insp Rise Time (%) 3 %   FiO2  40 %   I Time/ I Time % 0.85 s   Minute Volume (L/min) 7.8 Liters   Mask Leak (lpm) 17 lpm   Patient's Home Machine No   Alarm Settings   Alarms On Y   Low Pressure (cmH2O) 5 cmH2O   High Pressure (cmH2O) 40 cmH2O   RR Low (bpm) 14   RR High (bpm) 40 br/min   Oxygen Therapy/Pulse Ox   O2 Therapy Oxygen humidified   O2 Device (S)  PAP (positive airway pressure)   Pulse Oximeter Device Mode Continuous   Pulse Oximeter Device Location Finger       
   01/24/25 0300   NIV Type   NIV Started/Stopped On   Equipment Type V60   Mode AVAPS   Mask Type Full face mask   Mask Size Small   Assessment   Pulse 89   Respirations 27   /63   Level of Consciousness 0   Comfort Level Good   Using Accessory Muscles No   Mask Compliance Good   Skin Assessment Clean, dry, & intact   Skin Protection for O2 Device No   Settings/Measurements   PIP Observed 11 cm H20   CPAP/EPAP 5 cmH2O   IPAP Min 10 cmH2O   IPAP Max 20 cmH2O   Vt (Set, mL) 380 mL   Vt (Measured) 385 mL   Rate Ordered 14   Insp Rise Time (%) 3 %   FiO2  40 %   I Time/ I Time % 0.85 s   Minute Volume (L/min) 9.2 Liters   Mask Leak (lpm) 13 lpm   Patient's Home Machine No   Alarm Settings   Alarms On Y   Low Pressure (cmH2O) 5 cmH2O   High Pressure (cmH2O) 40 cmH2O   RR Low (bpm) 14   RR High (bpm) 40 br/min   Oxygen Therapy/Pulse Ox   O2 Therapy Oxygen humidified   O2 Device PAP (positive airway pressure)   Pulse Oximeter Device Mode Continuous   Pulse Oximeter Device Location Finger       
   01/24/25 3180   NIV Type   NIV Started/Stopped (S)  On   Equipment Type V60   Mode AVAPS   Mask Type Full face mask   Mask Size Small   Assessment   Pulse (!) 103   Respirations 29   SpO2 100 %   Level of Consciousness 0   Comfort Level Good   Using Accessory Muscles No   Mask Compliance Good   Skin Assessment Clean, dry, & intact   Skin Protection for O2 Device Yes   Orientation Middle   Location Nose   Intervention(s) Skin Barrier   Settings/Measurements   PIP Observed 11 cm H20   CPAP/EPAP 5 cmH2O   IPAP Min 20 cmH2O   IPAP Max 10 cmH2O   Vt (Set, mL) 380 mL   Vt (Measured) 391 mL   Rate Ordered 14   FiO2  40 %   I Time/ I Time % 0.85 s   Minute Volume (L/min) 9.7 Liters   Mask Leak (lpm) 17 lpm   Patient's Home Machine No   Alarm Settings   Alarms On Y   Low Pressure (cmH2O) 5 cmH2O   High Pressure (cmH2O) 40 cmH2O   RR Low (bpm) 14   RR High (bpm) 40 br/min   Oxygen Therapy/Pulse Ox   O2 Therapy Oxygen humidified   O2 Device (S)  PAP (positive airway pressure)   Pulse Oximeter Device Mode Continuous   Pulse Oximeter Device Location Finger       
   01/25/25 0332   NIV Type   NIV Started/Stopped On   Equipment Type V60   Mode AVAPS   Mask Type Full face mask   Mask Size Small   Assessment   Pulse 96   Respirations 26   SpO2 99 %   Level of Consciousness 0   Comfort Level Good   Using Accessory Muscles No   Mask Compliance Good   Skin Assessment Clean, dry, & intact   Skin Protection for O2 Device Yes   Orientation Middle   Location Nose   Intervention(s) Skin Barrier   Settings/Measurements   PIP Observed 11 cm H20   CPAP/EPAP 5 cmH2O   IPAP Min 10 cmH2O   IPAP Max 20 cmH2O   Vt (Set, mL) 380 mL   Vt (Measured) 387 mL   Rate Ordered 14   Insp Rise Time (%) 3 %   FiO2  40 %   I Time/ I Time % 0.85 s   Minute Volume (L/min) 11.9 Liters   Mask Leak (lpm) 0 lpm   Patient's Home Machine No   Alarm Settings   Alarms On Y   Low Pressure (cmH2O) 5 cmH2O   High Pressure (cmH2O) 40 cmH2O   RR Low (bpm) 14   RR High (bpm) 40 br/min   Oxygen Therapy/Pulse Ox   O2 Therapy Oxygen humidified   O2 Device PAP (positive airway pressure)       
   01/25/25 0733   NIV Type   Mode AVAPS   Mask Type Full face mask   Mask Size Small   Assessment   Respirations 22   SpO2 99 %   Settings/Measurements   CPAP/EPAP 5 cmH2O   IPAP Min 10 cmH2O   IPAP Max 20 cmH2O   Vt (Set, mL) 380 mL   Vt (Measured) 450 mL   Rate Ordered 14   FiO2  40 %       
   01/25/25 1520   NIV Type   Mode AVAPS   Mask Type Full face mask   Mask Size Small   Assessment   Respirations 24   SpO2 92 %   Settings/Measurements   CPAP/EPAP 5 cmH2O   IPAP Min 10 cmH2O   IPAP Max 20 cmH2O   Vt (Set, mL) 380 mL   Vt (Measured) 407 mL   Rate Ordered 14   FiO2  40 %       
   01/25/25 2304   NIV Type   Equipment Type v60   Mode AVAPS   Mask Type Full face mask   Mask Size Small   Assessment   Pulse (!) 116   Respirations (!) 32   BP (!) 123/46   Settings/Measurements   CPAP/EPAP 5 cmH2O   IPAP Min 10 cmH2O   IPAP Max 20 cmH2O   Vt (Set, mL) 380 mL   Vt (Measured) 383 mL   Rate Ordered 14   FiO2  40 %   I Time/ I Time % 0.85 s   Minute Volume (L/min) 13.7 Liters   Mask Leak (lpm) 0 lpm   Patient's Home Machine No   Patient Observation   Patient Observations spo2 98% on 40% avaps       
   01/26/25 0247   NIV Type   Equipment Type v60   Mode AVAPS   Mask Type Full face mask   Mask Size Small   Assessment   Pulse (!) 111   Respirations (!) 31   Settings/Measurements   CPAP/EPAP 5 cmH2O   IPAP Min 10 cmH2O   IPAP Max 20 cmH2O   Vt (Set, mL) 380 mL   Vt (Measured) 418 mL   Rate Ordered 14   FiO2  40 %   Minute Volume (L/min) 12.5 Liters   Mask Leak (lpm) 6 lpm   Patient's Home Machine No   Patient Observation   Patient Observations spo2 97% on 40% avaps       
   01/26/25 0727   NIV Type   Mode AVAPS   Mask Type Full face mask   Mask Size Small   Assessment   Respirations 28   SpO2 99 %   Settings/Measurements   CPAP/EPAP 5 cmH2O   IPAP Min 10 cmH2O   IPAP Max 20 cmH2O   Vt (Set, mL) 380 mL   Vt (Measured) 403 mL   Rate Ordered 14   FiO2  40 %       
   01/26/25 2000   RT Protocol   History Pulmonary Disease 2   Respiratory pattern 4   Breath sounds 6   Cough 4   Indications for Bronchodilator Therapy Decreased or absent breath sounds   Bronchodilator Assessment Score 16     RT Inhaler-Nebulizer Bronchodilator Protocol Note    There is a bronchodilator order in the chart from a provider indicating to follow the RT Bronchodilator Protocol and there is an “Initiate RT Inhaler-Nebulizer Bronchodilator Protocol” order as well (see protocol at bottom of note).    CXR Findings:  XR CHEST PORTABLE    Result Date: 1/26/2025  Severe emphysema. No acute findings with improved aeration of the lungs compared to prior exam.       The findings from the last RT Protocol Assessment were as follows:   History Pulmonary Disease: Chronic pulmonary disease  Respiratory Pattern: Mild dyspnea at rest, irregular pattern, or RR 21-25 bpm  Breath Sounds: Inspiratory and expiratory or bilateral wheezing and/or rhonchi  Cough: Unable to generate effective cough  Indication for Bronchodilator Therapy: Decreased or absent breath sounds  Bronchodilator Assessment Score: 16    Aerosolized bronchodilator medication orders have been revised according to the RT Inhaler-Nebulizer Bronchodilator Protocol below.    Respiratory Therapist to perform RT Therapy Protocol Assessment initially then follow the protocol.  Repeat RT Therapy Protocol Assessment PRN for score 0-3 or on second treatment, BID, and PRN for scores above 3.    No Indications - adjust the frequency to every 6 hours PRN wheezing or bronchospasm, if no treatments needed after 48 hours then discontinue using Per Protocol order mode.     If indication present, adjust the RT bronchodilator orders based on the Bronchodilator Assessment Score as indicated below.  Use Inhaler orders unless patient has one or more of the following: on home nebulizer, not able to hold breath for 10 seconds, is not alert and oriented, cannot activate and use 
   01/26/25 2018   NIV Type   NIV Started/Stopped On   Equipment Type V60   Mode AVAPS   Mask Type Full face mask   Mask Size Small   Assessment   Pulse (!) 114   Respirations 23   SpO2 100 %   Level of Consciousness 2   Comfort Level Good   Using Accessory Muscles No   Mask Compliance Good   Skin Assessment Clean, dry, & intact   Skin Protection for O2 Device Yes   Orientation Middle   Location Nose   Intervention(s) Skin Barrier   Settings/Measurements   PIP Observed 14 cm H20   CPAP/EPAP 5 cmH2O   IPAP Min 10 cmH2O   IPAP Max 20 cmH2O   Vt (Set, mL) 380 mL   Vt (Measured) 325 mL   Rate Ordered 14   Insp Rise Time (%) 3 %   FiO2  40 %   I Time/ I Time % 0.85 s   Minute Volume (L/min) 7.1 Liters   Mask Leak (lpm) 21 lpm   Patient's Home Machine No   Alarm Settings   Alarms On Y   Low Pressure (cmH2O) 5 cmH2O   High Pressure (cmH2O) 40 cmH2O   RR Low (bpm) 14   RR High (bpm) 40 br/min       
   01/26/25 8002   NIV Type   NIV Started/Stopped On   Equipment Type V60   Mode AVAPS   Mask Type Full face mask   Mask Size Small   Assessment   Pulse 98   Respirations 23   BP (!) 95/53   SpO2 100 %   Level of Consciousness 2   Comfort Level Good   Using Accessory Muscles No   Mask Compliance Good   Skin Assessment Clean, dry, & intact   Skin Protection for O2 Device Yes   Orientation Middle   Location Nose   Intervention(s) Skin Barrier   Settings/Measurements   PIP Observed 13 cm H20   CPAP/EPAP 5 cmH2O   IPAP Min 10 cmH2O   IPAP Max 20 cmH2O   Vt (Set, mL) 380 mL   Vt (Measured) 401 mL   Rate Ordered 14   Insp Rise Time (%) 3 %   FiO2  40 %   I Time/ I Time % 0.85 s   Minute Volume (L/min) 9 Liters   Mask Leak (lpm) 34 lpm   Patient's Home Machine No   Alarm Settings   Alarms On Y   Low Pressure (cmH2O) 5 cmH2O   High Pressure (cmH2O) 40 cmH2O   RR Low (bpm) 14   RR High (bpm) 40 br/min   Oxygen Therapy/Pulse Ox   O2 Therapy Oxygen humidified   O2 Device PAP (positive airway pressure)   Pulse Oximeter Device Mode Continuous   Pulse Oximeter Device Location Finger       
   01/27/25 0246   NIV Type   Equipment Type v60   Mode AVAPS   Mask Type Full face mask   Mask Size Small   Assessment   Pulse (!) 122   Respirations (!) 37   SpO2 97 %   Settings/Measurements   CPAP/EPAP 5 cmH2O   IPAP Min 10 cmH2O   IPAP Max 20 cmH2O   Vt (Set, mL) 380 mL   Vt (Measured) 330 mL   Rate Ordered 14   FiO2  40 %   Minute Volume (L/min) 11.5 Liters   Mask Leak (lpm) 1 lpm   Patient's Home Machine No   Patient Observation   Patient Observations spo2 96% on 40% avaps       
   01/27/25 0739   NIV Type   Mode AVAPS   Mask Type Full face mask   Mask Size Small   Assessment   Respirations 30   SpO2 100 %   Settings/Measurements   CPAP/EPAP 5 cmH2O   IPAP Min 10 cmH2O   IPAP Max 20 cmH2O   Vt (Set, mL) 380 mL   Vt (Measured) 345 mL   Rate Ordered 14   FiO2  40 %       
   01/27/25 1149   NIV Type   Mode AVAPS   Mask Type Full face mask   Mask Size Small   Assessment   Respirations (!) 31   SpO2 92 %   Settings/Measurements   CPAP/EPAP 5 cmH2O   IPAP Min 10 cmH2O   IPAP Max 20 cmH2O   Vt (Set, mL) 380 mL   Vt (Measured) 358 mL   Rate Ordered 14   FiO2  40 %       
  Pharmacy Vancomycin Consult     Vancomycin Day: 2  Current Dosing: Pulse  Current indication: PNA    Recent Labs     01/19/25  1638 01/20/25  0555   BUN 38* 49*   CREATININE 1.8* 2.3*   WBC 44.0* 38.3*       Estimated Creatinine Clearance: 13 mL/min (A) (based on SCr of 2.3 mg/dL (H)).    Trough: 20.9    Assessment/Plan:  Will dose with 500 mg x1 and recheck a level with AM labs tomorrow.     Deepali Mehta PharmD, [facility], 1/20/2025 8:06 AM      
  Speech Language Pathology  Attempt Note     Name: Terri Smith  : 1940  Medical Diagnosis: Choledocholithiasis with acute cholecystitis [K80.42]  Cholecystitis [K81.9]  Dysphagia, unspecified type [R13.10]      SLP attempted to see pt for MBS.  Evaluation / treatment unable to be completed as pt continues to require bipap. Hold MBS at this time. SLP to re-attempt as pt's condition and schedule allows. RN aware. No charges.    Thank you,    Lata Chin M.A. SLP  Speech-Language Pathologist  [place] Lic #[id number]  [phone]    
  Speech Language Pathology  Attempt Note     Name: Terri Smith  : 1940  Medical Diagnosis: Choledocholithiasis with acute cholecystitis [K80.42]  Cholecystitis [K81.9]  Dysphagia, unspecified type [R13.10]      SLP attempted to see pt for MBS.  Evaluation / treatment unable to be completed as pt requiring bipap this morning. Hold MBS at this time. Pt/family potentially to consider goals of care. SLP to re-attempt as pt's condition and schedule allows. RN aware. No charges.    Thank you,    Lata Chin M.A. SLP  Speech-Language Pathologist  [place] Lic #[id number]  [phone]    
  Speech Language Pathology  Attempt Note     Name: Terri Smith  : 1940  Medical Diagnosis: Choledocholithiasis with acute cholecystitis [K80.42]  Cholecystitis [K81.9]  Dysphagia, unspecified type [R13.10]      SLP attempted to see pt for dysphagia tx. Treatment unable to be completed due to SSU for ERCP. SLP to re-attempt as pt's condition and schedule allows. RN aware. No charges.    Thank you,  Bea Cornell M.A., CCC-SLP   [id number]  Speech-Language Pathologist  Desk: [phone]      
  Speech Language Pathology  Attempt Note     Name: Terri Smith  : 1940  Medical Diagnosis: Choledocholithiasis with acute cholecystitis [K80.42]  Cholecystitis [K81.9]  Dysphagia, unspecified type [R13.10]      SLP attempted to see pt for dysphagia tx. Treatment unable to be completed due to pt on full liquid diet for surgery and pt declined PO trials - pt noted to have minimal intake. SLP to re-attempt as pt's condition and schedule allows. RN aware. No charges.    Thank you,    Cindy Jeter MA CF-SLP   Speech Language Pathologist      
  Speech Language Pathology  Clinical Bedside Swallow Assessment  Facility/Department: [address] MEDICAL-SURGICAL        Recommendations:  Diet recommendation: Clear liquids per GI; Meds whole or crushed in puree as able  Instrumentation: Not clinically indicated at this time. Will continue to monitor  Risk management: upright for all intake, stay upright for at least 30 mins after intake, small bites/sips, assist feed, close supervision, oral care 2-3x/day to reduce adverse affects in the event of aspiration, increase physical mobility as able, alternate bites/sips, slow rate of intake, general GERD precautions, general aspiration precautions, and hold PO and contact SLP if s/s of aspiration or worsening respiratory status develop.  *Pt noted with extensive GI hx, including esophageal dysmotility and Schatzki's ring. EGD most recently completed 24 with dilation completed. Recommend GI continue to follow.     *Pt will benefit from re-assessment once cleared for solids by GI- SLP to monitor    NAME:Terri Smith  : 1940 (84 y.o.)   MRN: [id number]  ROOM: [address] [place] [address]-  ADMISSION DATE: 1/3/2025  PATIENT DIAGNOSIS(ES): Choledocholithiasis with acute cholecystitis [K80.42]  Cholecystitis [K81.9]  Dysphagia, unspecified type [R13.10]  Chief Complaint   Patient presents with    Tailbone Pain     Back pain after falling several months ago.    Nausea     Feels nauseated and unable to keep anything down.      Patient Active Problem List    Diagnosis Date Noted    Cholecystitis 2025    Choledocholithiasis with acute cholecystitis 2025    Hyperkalemia 2025    Nausea and vomiting 2025    Acute hypoxic respiratory failure 2024    Pneumonia, unspecified organism 2024    Vertigo 2024    Acute on chronic heart failure with normal ejection fraction (HCC) 2024    Chest pain due to CAD (HCC) 2024    Anticoagulated 2024    History of GI bleed 2024    
  Speech Language Pathology  Sign Off    Name: Terri Smith  : 1940  Medical Diagnosis: Choledocholithiasis with acute cholecystitis [K80.42]  Cholecystitis [K81.9]  Dysphagia, unspecified type [R13.10]      SLP attempted to see pt for dysphagia tx. Treatment unable to be completed as pt was intubated this date. Pt will be discharged from caseload at this time. Please re-consult SLP services when medically appropriate. RN aware. No charges.    Thank you,    Jeri Fallon M.A. CCC-SLP   Speech-Language Pathologist     
  Speech Language Pathology  Swallowing Disorders and Dysphagia    Dysphagia Treatment/Follow-Up Note  Facility/Department: [address] MEDICAL-SURGICAL    Recommendations: SLP recommends to advance to IDDSI 6 Soft and Bite Sized Solids; IDDSI 0 Thin Liquids and via small sips only; Meds whole in puree  Risk Management: upright for all intake, stay upright for at least 30 mins after intake, small bites/sips, close supervision, oral care 2-3x/day to reduce adverse affects in the event of aspiration, increase physical mobility as able, slow rate of intake, general GERD precautions, STRICT aspiration precautions, and hold PO and contact SLP if s/s of aspiration or worsening respiratory status develop.     NAME:Terri Smith  : 1940 (84 y.o.)   MRN: [id number]  ROOM: [facility]-02  ADMISSION DATE: 1/3/2025  PATIENT DIAGNOSIS(ES): Choledocholithiasis with acute cholecystitis [K80.42]  Cholecystitis [K81.9]  Dysphagia, unspecified type [R13.10]  Allergies   Allergen Reactions    Latex Other (See Comments)     Burning    Other reaction(s): Dermatitis, Other (See Comments)  Burning  Burning      Codeine Other (See Comments)     \"hallucinations\"  Other reaction(s): Other (See Comments)  \"hallucinations\"  Hallucinations    Hallucinations         DATE ONSET: 1/3/2025    Pain: The patient does not complain of pain.        Current Diet: ADULT DIET; Full Liquid  ADULT ORAL NUTRITION SUPPLEMENT; Breakfast, Lunch, Dinner; Standard High Calorie/High Protein Oral Supplement      Dysphagia Treatment and Impressions:  Subjective: Pt seen in room at bedside with RN (Sisi) permission  Noteworthy events reported/Chart Review: MD reported pt with significance aspiration of barium during esophagram. Impressions of esophagram completed on 25: \"Significantly limited exam by patient conditioning.  There was no obstruction of contrast from the esophagus into the stomach. Aspiration of swallowed barium prompted immediate 
 Latest Reference Range & Units 01/22/25 12:22   Hemoglobin Quant 12.0 - 16.0 g/dL 6.6 (LL)   Hematocrit 36.0 - 48.0 % 20.1 (LL)   (LL): Data is critically low    Received results 4028. Informed Dr. Madison and new orders to transfuse 1 u pRBCs  
 Progress Note    Admit Date:  1/3/2025    Choledocholithiasis s.p ERCP with stone removal     1/9  Developed hypoxia, tachycardia needing ICU transfer   Possible aspiration and also has significant edema  Given lasix IV with improving imaging findings      1/12  Subjective:  Ms. Smith seen fatiged , awake in ICU bed  Did not do well overnight with progressive weakness and dyspnea  Used AVAPS overnight , now on 4 L plans to swithc back to AVAPS  Minimal cough  No fevers    1/13- seen on Bipap. H and H low. Has a NG tube but tube feeds have not started. On AVAPS overnight.     1/14-patient did not use AVAPS last night.  On 6 L.  More short of breath.  No tachypnea.  Mentation slightly worse.    1/15-was on AVAPS all day and night.  6 L of oxygen this morning after I saw her.    1/16-  on AVAPS since 10 am. Awake and alert this am. ABG pending.     1/17-central venous catheter placed in the right IJ.  Seen on nasal cannula oxygen.  Used AVAPS last night.  Modified barium swallow did not show any signs of aspiration but she had trouble with swallowing.    1/18 - Pt reports occasional nausea, no abdominal pain, no vomiting.     1/19 - Pt seen this morning, says she doesn't feel good. Denies any specific complaints. Denies pain. Says she just doesn't feel well.     1/20- had drop in H and H and work up showed large retro peritoneal hematoma. She was intubated. Head CT showed stroke. Surgery has seen patient. No surgical plans. H and H stable. Heparin stopped and reversed.    1/21- awake and alert. SBT today. Possible PEG in am. Discussed with GI     1/22-plans for PEG tube today.  Discussed with the daughter about this again who says she wants to continue full code and wants PEG tube placed.  MRI showed stroke.  Discussed with neurology yesterday.    1/23- PEG placed, awake and alert on the ventilator. On SBT    1/24-[place] 2325 and placed on AVAPS.  She has been on AVAPS since then.  Tolerating tube 
 Progress Note    Admit Date:  1/3/2025    Choledocholithiasis s.p ERCP with stone removal     1/9  Developed hypoxia, tachycardia needing ICU transfer   Possible aspiration and also has significant edema  Given lasix IV with improving imaging findings      1/12  Subjective:  Ms. Smith seen fatiged , awake in ICU bed  Did not do well overnight with progressive weakness and dyspnea  Used AVAPS overnight , now on 4 L plans to swithc back to AVAPS  Minimal cough  No fevers    1/13- seen on Bipap. H and H low. Has a NG tube but tube feeds have not started. On AVAPS overnight.     1/14-patient did not use AVAPS last night.  On 6 L.  More short of breath.  No tachypnea.  Mentation slightly worse.    1/15-was on AVAPS all day and night.  6 L of oxygen this morning after I saw her.    1/16-  on AVAPS since 10 am. Awake and alert this am. ABG pending.     1/17-central venous catheter placed in the right IJ.  Seen on nasal cannula oxygen.  Used AVAPS last night.  Modified barium swallow did not show any signs of aspiration but she had trouble with swallowing.    1/18 - Pt reports occasional nausea, no abdominal pain, no vomiting.     1/19 - Pt seen this morning, says she doesn't feel good. Denies any specific complaints. Denies pain. Says she just doesn't feel well.     1/20- had drop in H and H and work up showed large retro peritoneal hematoma. She was intubated. Head CT showed stroke. Surgery has seen patient. No surgical plans. H and H stable. Heparin stopped and reversed.    1/21- awake and alert. SBT today. Possible PEG in am. Discussed with GI     1/22-plans for PEG tube today.  Discussed with the daughter about this again who says she wants to continue full code and wants PEG tube placed.  MRI showed stroke.  Discussed with neurology yesterday.    1/23- PEG placed, awake and alert on the ventilator. On SBT    Objective:   BP (!) 140/56   Pulse 83   Temp 97 °F (36.1 °C) (Bladder)   Resp 24   Ht 1.626 m (5' 
0930Prior to receiving transfusion of blood products, patient educated on the following:    Blood product transfusion process  Benefits of receiving blood product transfusion  Risks associated with receiving the transfusion  Signs and symptoms of complications associated with blood product transfusion  Vague, uneasy feeling  Onset of pain (especially at the IV site, back, or chest)  Chills  Flushing  Fever  Nausea  Dizziness  Rash  Itching  Dark or red urine  Instructed patient to notify RN immediately if any sign or symptom occurs       -Discussed w/ pts son in law at bedside prior to transfusion. D5 & antibiotic stopped during transfusion. Pt only has 1 PIV that was US placed. Unable to get ML access.     Ignacia Lanza RN, BSN    
1 u PRBC ordered, type and screen needed, lab aware.  
1/18/2025  Current drip rate = 18 units/kg//hr  Anti-Xa = 0.51 at 1243.  Per protocol continue heparin gtt at 18 units/kg/hr.  Change to daily monitoring. Next level due 0600    Eric Blizzard, RPH 1/18/2025 1:24 PM      
1/21  Vanc random = 21.9 mcg/mL at 0542.  Renal function slightly worse today.  Hold vancomycin x1.  Continue to check vanc levels daily.  Tomas Hudson, PharmD  1/21/2025 6:40 AM  
1215: Pt taken down for MRI with RT and RN at bedside with transport.   1224: Pt given 2mg IVP versed as she was RASS 0 and needed to be completely still for MRI/MRA head.   1253: Pt MRI started. /52 HR 82    1356: Pt out of MRI, back on ICU monitor and bed. RT and RN remain at bedside during transport. Pt tolerated whole procedures well.   
20 fr PEG tube placed at 1136 in the RUQ at 1.5 cm.   
4 Eyes Skin Assessment     NAME:  Terri Smith  YOB: 1940  MEDICAL RECORD NUMBER:  [id number]    The patient is being assessed for  Other low Trey scale    I agree that at least one RN has performed a thorough Head to Toe Skin Assessment on the patient. ALL assessment sites listed below have been assessed.      Areas assessed by both nurses:    Head, Face, Ears, Shoulders, Back, Chest, Arms, Elbows, Hands, Sacrum. Buttock, Coccyx, Ischium, Legs. Feet and Heels, and Under Medical Devices    Scattered bruising To BL upper and lower extremities. Wound on buttocks, olivier area excoriation, BL heel redness (blanchable), Skin tears, see photos.                                  Does the Patient have a Wound? Yes wound(s) were present on assessment. LDA wound assessment was Initiated and completed by RN       Trey Prevention initiated by RN: Yes  Wound Care Orders initiated by RN: Yes    Pressure Injury (Stage 3,4, Unstageable, DTI, NWPT, and Complex wounds) if present, place Wound referral order by RN under : No    New Ostomies, if present place, Ostomy referral order under : No     Nurse 1 eSignature: Electronically signed by Arely Moncada RN on 1/11/25 at 2:36 AM EST    **SHARE this note so that the co-signing nurse can place an eSignature**    Nurse 2 eSignature: Electronically signed by Lesley Manrique RN on 1/11/25 at 8:06 AM EST    
4 Eyes Skin Assessment     NAME:  Terri Smith  YOB: 1940  MEDICAL RECORD NUMBER:  [id number]    The patient is being assessed for  Shift Handoff    I agree that at least one RN has performed a thorough Head to Toe Skin Assessment on the patient. ALL assessment sites listed below have been assessed.      Areas assessed by both nurses:    Head, Face, Ears, Shoulders, Back, Chest, Arms, Elbows, Hands, Sacrum. Buttock, Coccyx, Ischium, Legs. Feet and Heels, and Under Medical Devices     Scattered bruising To BL upper and lower extremities. Wound on buttocks, olivier area excoriation, BL heel redness (blanchable), Skin tears, see photos.                                       Does the Patient have a Wound? Yes wound(s) were present on assessment. LDA wound assessment was Initiated and completed by SUJIT Yang Prevention initiated by RN: Yes  Wound Care Orders initiated by RN: Yes    Pressure Injury (Stage 3,4, Unstageable, DTI, NWPT, and Complex wounds) if present, place Wound referral order by RN under : No    New Ostomies, if present place, Ostomy referral order under : No     Nurse 1 eSignature: Electronically signed by Arely Moncada RN on 1/12/25 at 5:36 AM EST    **SHARE this note so that the co-signing nurse can place an eSignature**    Nurse 2 eSignature: Electronically signed by Amish Araiza RN on 1/12/25 at 5:50 AM EST  
4 Eyes Skin Assessment     NAME:  Terri Smith  YOB: 1940  MEDICAL RECORD NUMBER:  [id number]    The patient is being assessed for  Shift Handoff    I agree that at least one RN has performed a thorough Head to Toe Skin Assessment on the patient. ALL assessment sites listed below have been assessed.      Areas assessed by both nurses:    Head, Face, Ears, Shoulders, Back, Chest, Arms, Elbows, Hands, Sacrum. Buttock, Coccyx, Ischium, Legs. Feet and Heels, and Under Medical Devices         Buttocks and olivier area excoriated. Pink zinc cream applied X 3 this shift      Does the Patient have a Wound? Yes wound(s) were present on assessment. LDA wound assessment was Initiated and completed by RN       Tery Prevention initiated by RN: Yes  Wound Care Orders initiated by RN: No    Pressure Injury (Stage 3,4, Unstageable, DTI, NWPT, and Complex wounds) if present, place Wound referral order by RN under : No    New Ostomies, if present place, Ostomy referral order under : No     Nurse 1 eSignature: Electronically signed by Leila Javier RN on 1/18/25 at 6:08 AM EST    **SHARE this note so that the co-signing nurse can place an eSignature**    Nurse 2 eSignature: Electronically signed by Calvin Vegas RN on 1/18/25 at 6:55 AM EST   
4 Eyes Skin Assessment     NAME:  Terri Smith  YOB: 1940  MEDICAL RECORD NUMBER:  [id number]    The patient is being assessed for  Shift Handoff    I agree that at least one RN has performed a thorough Head to Toe Skin Assessment on the patient. ALL assessment sites listed below have been assessed.      Areas assessed by both nurses:    Head, Face, Ears, Shoulders, Back, Chest, Arms, Elbows, Hands, Sacrum. Buttock, Coccyx, Ischium, Legs. Feet and Heels, and Under Medical Devices         Does the Patient have a Wound? Yes wound(s) were present on assessment. LDA wound assessment was Initiated and completed by RN       Trey Prevention initiated by RN: Yes  Wound Care Orders initiated by RN: No    Pressure Injury (Stage 3,4, Unstageable, DTI, NWPT, and Complex wounds) if present, place Wound referral order by RN under : No    New Ostomies, if present place, Ostomy referral order under : No     Nurse 1 eSignature: Electronically signed by Emily Menendez RN on 1/14/25 at 4:46 AM EST    **SHARE this note so that the co-signing nurse can place an eSignature**    Nurse 2 eSignature: Electronically signed by Susan Caballero RN on 1/14/25 at 5:29 AM EST   
4 Eyes Skin Assessment     NAME:  Terri Smith  YOB: 1940  MEDICAL RECORD NUMBER:  [id number]    The patient is being assessed for  Shift Handoff    I agree that at least one RN has performed a thorough Head to Toe Skin Assessment on the patient. ALL assessment sites listed below have been assessed.      Areas assessed by both nurses:    Head, Face, Ears, Shoulders, Back, Chest, Arms, Elbows, Hands, Sacrum. Buttock, Coccyx, Ischium, Legs. Feet and Heels, and Under Medical Devices                                               Does the Patient have a Wound? Yes wound(s) were present on assessment. LDA wound assessment was Initiated and completed by RN       Trey Prevention initiated by RN: Yes  Wound Care Orders initiated by RN: No    Pressure Injury (Stage 3,4, Unstageable, DTI, NWPT, and Complex wounds) if present, place Wound referral order by RN under : No    New Ostomies, if present place, Ostomy referral order under : NO    Nurse 1 eSignature: Electronically signed by Emily Menendez RN on 1/16/25 at 1:49 AM EST    **SHARE this note so that the co-signing nurse can place an eSignature**    Nurse 2 eSignature: Electronically signed by Marisela Francois RN on 1/16/25 at 2:10 AM EST   
4 Eyes Skin Assessment     NAME:  Terri Smith  YOB: 1940  MEDICAL RECORD NUMBER:  [id number]    The patient is being assessed for  Shift Handoff    I agree that at least one RN has performed a thorough Head to Toe Skin Assessment on the patient. ALL assessment sites listed below have been assessed.      Areas assessed by both nurses:    Head, Face, Ears, Shoulders, Back, Chest, Arms, Elbows, Hands, Sacrum. Buttock, Coccyx, Ischium, Legs. Feet and Heels, and Under Medical Devices                                               Does the Patient have a Wound? Yes wound(s) were present on assessment. LDA wound assessment was Initiated and completed by RN       Trey Prevention initiated by RN: Yes  Wound Care Orders initiated by RN: No    Pressure Injury (Stage 3,4, Unstageable, DTI, NWPT, and Complex wounds) if present, place Wound referral order by RN under : No    New Ostomies, if present place, Ostomy referral order under : No     Nurse 1 eSignature: Electronically signed by Emily Menendez RN on 1/22/25 at 8:02 PM EST    **SHARE this note so that the co-signing nurse can place an eSignature**    Nurse 2 eSignature: Electronically signed by ROGELIO ARROYO RN on 1/23/25 at 4:03 AM EST   
4 Eyes Skin Assessment     NAME:  Terri Smith  YOB: 1940  MEDICAL RECORD NUMBER:  [id number]    The patient is being assessed for  Shift Handoff    I agree that at least one RN has performed a thorough Head to Toe Skin Assessment on the patient. ALL assessment sites listed below have been assessed.      Areas assessed by both nurses:    Head, Face, Ears, Shoulders, Back, Chest, Arms, Elbows, Hands, Sacrum. Buttock, Coccyx, Ischium, Legs. Feet and Heels, and Under Medical Devices         Does the Patient have a Wound?        Trey Prevention initiated by RN: Yes  Wound Care Orders initiated by RN: Yes    Pressure Injury (Stage 3,4, Unstageable, DTI, NWPT, and Complex wounds) if present, place Wound referral order by RN under : No    New Ostomies, if present place, Ostomy referral order under : No     Nurse 1 eSignature: Electronically signed by Leila Javier RN on 1/17/25 at 6:53 AM EST    **SHARE this note so that the co-signing nurse can place an eSignature**    Nurse 2 eSignature: Electronically signed by ROGELIO ARROYO RN on 1/17/25 at 6:54 AM EST   
4 Eyes Skin Assessment     NAME:  Terri Smith  YOB: 1940  MEDICAL RECORD NUMBER:  [id number]    The patient is being assessed for  Other shift assessment     I agree that at least one RN has performed a thorough Head to Toe Skin Assessment on the patient. ALL assessment sites listed below have been assessed.      Areas assessed by both nurses:    Head, Face, Ears, Shoulders, Back, Chest, Arms, Elbows, Hands, Sacrum. Buttock, Coccyx, Ischium, and Legs. Feet and Heels        Does the Patient have a Wound? Yes wound(s) were present on assessment. LDA wound assessment was Initiated and completed by RN                                         Trey Prevention initiated by RN: Yes  Wound Care Orders initiated by RN: No    Pressure Injury (Stage 3,4, Unstageable, DTI, NWPT, and Complex wounds) if present, place Wound referral order by RN under : No    New Ostomies, if present place, Ostomy referral order under : No     Nurse 1 eSignature: Electronically signed by Tisha Wilcox RN on 1/19/25 at 3:13 AM EST    **SHARE this note so that the co-signing nurse can place an eSignature**    Nurse 2 eSignature: {Esignature:[id number]}    
4 Eyes Skin Assessment     NAME:  Terri Smith  YOB: 1940  MEDICAL RECORD NUMBER:  [id number]    The patient is being assessed for  Other Low Trey scale    I agree that at least one RN has performed a thorough Head to Toe Skin Assessment on the patient. ALL assessment sites listed below have been assessed.      Areas assessed by both nurses:    Head, Face, Ears, Shoulders, Back, Chest, Arms, Elbows, Hands, Sacrum. Buttock, Coccyx, Ischium, Legs. Feet and Heels, and Under Medical Devices    Scattered bruising To BL upper and lower extremities. Wound on buttocks, olivier area excoriation, BL heel redness (blanchable), Skin tears, see photos.      Does the Patient have a Wound? Yes wound(s) were present on assessment. LDA wound assessment was Initiated and completed by RN       Trey Prevention initiated by RN: Yes  Wound Care Orders initiated by RN: Yes    Pressure Injury (Stage 3,4, Unstageable, DTI, NWPT, and Complex wounds) if present, place Wound referral order by RN under : No    New Ostomies, if present place, Ostomy referral order under : No     Nurse 1 eSignature: Electronically signed by Arely Moncada RN on 1/12/25 at 11:30 PM EST    **SHARE this note so that the co-signing nurse can place an eSignature**    Nurse 2 eSignature: Electronically signed by Susan Caballero RN on 1/14/25 at 5:30 AM EST    
4 Eyes Skin Assessment     NAME:  Terri Smith  YOB: 1940  MEDICAL RECORD NUMBER:  [id number]    The patient is being assessed for  Admission    I agree that at least one RN has performed a thorough Head to Toe Skin Assessment on the patient. ALL assessment sites listed below have been assessed.      Areas assessed by both nurses:    Head, Face, Ears, Shoulders, Back, Chest, Arms, Elbows, Hands, Sacrum. Buttock, Coccyx, Ischium, Legs. Feet and Heels, and Under Medical Devices         Does the Patient have a Wound? Yes wound(s) were present on assessment. LDA wound assessment was Initiated and completed by RN    Buttocks  LLE  Red heels       Trey Prevention initiated by RN: No  Wound Care Orders initiated by RN: Yes    Pressure Injury (Stage 3,4, Unstageable, DTI, NWPT, and Complex wounds) if present, place Wound referral order by RN under : No    New Ostomies, if present place, Ostomy referral order under : No     Nurse 1 eSignature: Electronically signed by Sydni Quiros RN on 1/4/25 at 4:40 AM EST    **SHARE this note so that the co-signing nurse can place an eSignature**    Nurse 2 eSignature: Electronically signed by Maricruz Velásquez RN on 1/4/25 at 4:43 AM EST    
4 Eyes Skin Assessment     NAME:  Terri Smith  YOB: 1940  MEDICAL RECORD NUMBER:  [id number]    The patient is being assessed for  Shift Handoff    I agree that at least one RN has performed a thorough Head to Toe Skin Assessment on the patient. ALL assessment sites listed below have been assessed.      Areas assessed by both nurses:    Head, Face, Ears, Shoulders, Back, Chest, Arms, Elbows, Hands, Sacrum. Buttock, Coccyx, Ischium, Legs. Feet and Heels, and Under Medical Devices                                               Does the Patient have a Wound? Yes wound(s) were present on assessment. LDA wound assessment was Initiated and completed by RN       Trey Prevention initiated by RN: Yes  Wound Care Orders initiated by RN: Yes    Pressure Injury (Stage 3,4, Unstageable, DTI, NWPT, and Complex wounds) if present, place Wound referral order by RN under : No    New Ostomies, if present place, Ostomy referral order under : No     Nurse 1 eSignature: Electronically signed by Emily Menendez RN on 1/14/25 at 9:26 PM EST    **SHARE this note so that the co-signing nurse can place an eSignature**    Nurse 2 eSignature: Electronically signed by Marisela Francois RN on 1/15/25 at 5:59 AM EST   
4 Eyes Skin Assessment     NAME:  Treri Smith  YOB: 1940  MEDICAL RECORD NUMBER:  [id number]    The patient is being assessed for  Shift Handoff    I agree that at least one RN has performed a thorough Head to Toe Skin Assessment on the patient. ALL assessment sites listed below have been assessed.      Areas assessed by both nurses:    Head, Face, Ears, Shoulders, Back, Chest, Arms, Elbows, Hands, Sacrum. Buttock, Coccyx, Ischium, Legs. Feet and Heels, and Under Medical Devices                                               Does the Patient have a Wound? Yes wound(s) were present on assessment. LDA wound assessment was Initiated and completed by RN       Trey Prevention initiated by RN: Yes  Wound Care Orders initiated by RN: Yes    Pressure Injury (Stage 3,4, Unstageable, DTI, NWPT, and Complex wounds) if present, place Wound referral order by RN under : No    New Ostomies, if present place, Ostomy referral order under : No     Nurse 1 eSignature: Electronically signed by Emily Menendez RN on 1/24/25 at 5:23 AM EST    **SHARE this note so that the co-signing nurse can place an eSignature**    Nurse 2 eSignature: Electronically signed by Susan Caballero RN on 1/24/25 at 5:36 AM EST   
ABG drawn x 1 attempt(s) from right radial artery.  Patient had positive modified Christian's Test.  Patient was on 50% (LPM/Fio2) oxygen per avaps (device).  Pressure held x 10 minutes.  No bleeding or bruising noted at puncture site.  Patient tolerated procedure well.    
ABG results called to Dr. Juarez for extubation  
All family has arrived. Stopped precedex, levophed. Removed bipap and placed on 2L/NC  
Ativan 1.5mg wasted with Yumiko Fowler RN.  Unable to waste in the [facility]. Spoke with Gerald in the Pharm.  
Ativan po per pt request for c/o anxiety.  
Attempted BiPap with pt with much encouragement and after Precedex had been on for 1 hour. After 15 minutes pt yelling to have it taken off . B/P had dropped to 86/53 so was unable to titrate Precedex up. mBiPap D/Cedband Precedex D/Jean.   
Attempted to give PO this AM. Pt was able to take a couple of her AM meds but then got nauseated with small amount of emesis. The remaining AM meds were held and pt NPO.  
Awake incont bile colored loose stool. Reposition up in bed.  
B/P fell to 74/49 after Precidex increased to 0.5 mcg/kg/hr. Precedex decreased to 0.4. Pt still holding the phone in front of her face yelling for help.   
BP (!) 144/79   Pulse 87   Temp 97.7 °F (36.5 °C) (Oral)   Resp 18   Ht 1.626 m (5' 4.02\")   Wt 47.2 kg (104 lb)   SpO2 95%   BMI 17.84 kg/m²     Pt awake in bed. Pt alert and orientedX4. Pt is anxious. Pt given PRN Ativan. Assessment complete. Meds passed. Pt denies needs at this time.        Bedside Mobility Assessment Tool (BMAT):     Assessment Level 1- Sit and Shake    1. From a semi-reclined position, ask patient to sit up and rotate to a seated position at the side of the bed. Can use the bedrail.    2. Ask patient to reach out and grab your hand and shake making sure patient reaches across his/her midline.   Pass- Patient is able to come to a seated position, maintain core strength. Maintains seated balance while reaching across midline. Move on to Assessment Level 2.     Assessment Level 2- Stretch and Point   1. With patient in seated position at the side of the bed, have patient place both feet on the floor (or stool) with knees no higher than hips.    2. Ask patient to stretch one leg and straighten the knee, then bend the ankle/flex and point the toes. If appropriate, repeat with the other leg.   Pass- Patient is able to demonstrate appropriate quad strength on intended weight bearing limb(s). Move onto Assessment Level 3.     Assessment Level 3- Stand   1. Ask patient to elevate off the bed or chair (seated to standing) using an assistive device (cane, bedrail).    2. Patient should be able to raise buttocks off be and hold for a count of five. May repeat once.   Pass- Patient maintains standing stability for at least 5 seconds, proceed to assessment level 4.    Assessment Level 4- Walk   1. Ask patient to march in place at bedside.    2. Then ask patient to advance step and return each foot. Some medical conditions may render a patient from stepping backwards, use your best clinical judgement.   Fail- Patient not able to complete tasks OR requires use of assistive device. Patient is MOBILITY 
Bath completed. Pt desat with turning. Recovered with Bipap for 10 minutes then placed back on 3 l O2.   
Bedside Mobility Assessment Tool (BMAT):     Assessment Level 1- Sit and Shake    1. From a semi-reclined position, ask patient to sit up and rotate to a seated position at the side of the bed. Can use the bedrail.    2. Ask patient to reach out and grab your hand and shake making sure patient reaches across his/her midline.   Pass- Patient is able to come to a seated position, maintain core strength. Maintains seated balance while reaching across midline. Move on to Assessment Level 2.     Assessment Level 2- Stretch and Point   1. With patient in seated position at the side of the bed, have patient place both feet on the floor (or stool) with knees no higher than hips.    2. Ask patient to stretch one leg and straighten the knee, then bend the ankle/flex and point the toes. If appropriate, repeat with the other leg.   Pass- Patient is able to demonstrate appropriate quad strength on intended weight bearing limb(s). Move onto Assessment Level 3.     Assessment Level 3- Stand   1. Ask patient to elevate off the bed or chair (seated to standing) using an assistive device (cane, bedrail).    2. Patient should be able to raise buttocks off be and hold for a count of five. May repeat once.   Fail- Patient unable to demonstrate standing stability. Patient is MOBILITY LEVEL 3.     Assessment Level 4- Walk   1. Ask patient to march in place at bedside.    2. Then ask patient to advance step and return each foot. Some medical conditions may render a patient from stepping backwards, use your best clinical judgement.   Fail- Patient not able to complete tasks OR requires use of assistive device. Patient is MOBILITY LEVEL 3.       Mobility Level- 3   
Bedside Mobility Assessment Tool (BMAT):     Assessment Level 1- Sit and Shake    1. From a semi-reclined position, ask patient to sit up and rotate to a seated position at the side of the bed. Can use the bedrail.    2. Ask patient to reach out and grab your hand and shake making sure patient reaches across his/her midline.   Pass- Patient is able to come to a seated position, maintain core strength. Maintains seated balance while reaching across midline. Move on to Assessment Level 2.     Assessment Level 2- Stretch and Point   1. With patient in seated position at the side of the bed, have patient place both feet on the floor (or stool) with knees no higher than hips.    2. Ask patient to stretch one leg and straighten the knee, then bend the ankle/flex and point the toes. If appropriate, repeat with the other leg.   Pass- Patient is able to demonstrate appropriate quad strength on intended weight bearing limb(s). Move onto Assessment Level 3.     Assessment Level 3- Stand   1. Ask patient to elevate off the bed or chair (seated to standing) using an assistive device (cane, bedrail).    2. Patient should be able to raise buttocks off be and hold for a count of five. May repeat once.   Pass- Patient maintains standing stability for at least 5 seconds, proceed to assessment level 4.    Assessment Level 4- Walk   1. Ask patient to march in place at bedside.    2. Then ask patient to advance step and return each foot. Some medical conditions may render a patient from stepping backwards, use your best clinical judgement.   Fail- Patient not able to complete tasks OR requires use of assistive device. Patient is MOBILITY LEVEL 3.       Mobility Level- 3   
Bedside report and Pt care transferred to Leila BECKETT. Pt denies any assistance at this time.   
Bedside report and Pt care transferred to Leila BECKETT. Pt denies any assistance at this time.   
Bedside report given to Emily RN. POC transferred. SUJIT Montero    
Bedside report given to SUJIT Wellington. POC transferred. SUJIT Montero    
[facility]   COPD PROGRAM      NAME:  Terri Smith  AGE: 84 y.o.   GENDER: female  : 1940  TODAY'S DATE:  2025    Subjective:     VISIT TYPE: Education    ADMIT DATE: 1/3/2025    PAST MEDICAL HISTORY:      Diagnosis Date    NADJA (acute kidney injury) ([facility])     Asthma     Atrial fibrillation with RVR ([facility])     CAD (coronary artery disease)     CHF (congestive heart failure) ([facility])     unsure    Chronic anxiety     COPD (chronic obstructive pulmonary disease) ([facility])     COPD (chronic obstructive pulmonary disease) ([facility]) 2023    GERD (gastroesophageal reflux disease) 2021    Heart attack ([facility]) 2019    History of blood transfusion     Hyperlipidemia     Hypertension     MRSA (methicillin resistant staph aureus) culture positive 2019    + resp cx    OA (osteoarthritis) 2014    Thyroid disease      HOME MEDICATIONS:  Prior to Admission medications    Medication Sig Start Date End Date Taking? Authorizing Provider   meclizine (ANTIVERT) 12.5 MG tablet Take 1 tablet by mouth 3 times daily as needed for Dizziness 25 Yes Popeye Madison MD   predniSONE (DELTASONE) 10 MG tablet 4 tabs for 3 days 3 tabs for 3 days 2 tabs for 3 days 1 tabs for 3 days 25  Yes Popeye Madison MD   amoxicillin-clavulanate (AUGMENTIN) 500-125 MG per tablet Take 1 tablet by mouth 3 times daily for 5 days 25 Yes Popeye Madison MD   busPIRone (BUSPAR) 15 MG tablet Take 15 mg by mouth 2 times daily Indications: Feeling Anxious   Yes ProviderCharmaine MD   gabapentin (NEURONTIN) 100 MG capsule Take 1 capsule by mouth 3 times daily.   Yes ProviderCharmaine MD   guaiFENesin (MUCINEX) 600 MG extended release tablet Take 2 tablets by mouth 2 times daily   Yes Charmaine Gant MD   magnesium oxide (MAG-OX) 400 (240 Mg) MG tablet Take 1 tablet by mouth daily   Yes Charmaine Gant MD   furosemide (LASIX) 20 MG tablet Take 1 tablet 
Blood pressure 97/67, pulse 80, temperature 98.5 °F (36.9 °C), temperature source Bladder, resp. rate 24, height 1.626 m (5' 4.02\"), weight 45.7 kg (100 lb 12.8 oz), SpO2 97%, not currently breastfeeding.    Patient currently intubated with ETT at 24LL. Vent settings are 20/350/+30%/5.  Patient is tolerating vent well.     Central line dressing is clean, dry and intact with no signs of drainage. All tubing is current and dated. IPA caps applied to all access hubs.     BUE soft wrist restraints in place due to attempts of pulling at ETT and lines. No signs of injury noted and PROM performed.     Bill catheter in place with scant amount of urine noted.     Shift assessment complete. See doc flow. Nightly medications given see MAR. Call light and bedside table within easy reach.     
CM-SR, VSS, pt remain afebrile, UO WNL Pt on 4 liters per NC and BiPap PRN. Pt unable to take PO. Pt NPO and NG placed.  Will continue to monitor.  
CM-SR, VSS, pt remains afebrile, UO WNL. Pt is on 4 liters per high flow NC and BiPap PRN.  Pt is strict NPO. She is resting w/out evidence of distress @ this time.  
Called Julian for consult spoke to Kianna, Electronically signed by Traci Riley on 1/19/2025 at 5:40 PM  
Called pts daughter Arjunsurindergrace & updated on blood transfusion. Pts daughter emma w/ patient receiving blood products.     Ignacia Lanza RN, BSN    
Care rounds complete with Dr. Huber. Patient's daughter at bedside. Patient with large secretions. Dr. Huber attempted to NT suction. Discussed plan of care with patient's daughter. Plan to bronch patient. Patient's family does not wish for patient to be intubated at this time. Tube feed off for bronch.   
Care rounds completed with Dr. Huber and multidisciplinary team. Reviewed labs, meds, VS, assessment, & plan of care for today. See dictated note and new orders for details.     Awaiting hospice to arrive, notify if/when comfort medications are needed  
Care rounds completed with Dr. Huber and multidisciplinary team. Reviewed labs, meds, VS, assessment, & plan of care for today. See dictated note and new orders for details.     Hold vancomycin today  Switch to Augmentin  Check triglycerides  Change to prednisone from solumedrol   
Comprehensive Nutrition Assessment    Type and Reason for Visit:  Reassess    Nutrition Recommendations/Plan:   Continue ADULT DIET; Dysphagia - Soft and Bite-Sized diet order - consistency changes, per SLP guidance.   Continue Ensure Plus with meals.   Monitor appetite, meal intake, and acceptance/intake of ONS.   Obtain an updated weight for this patient - last weight was obtained on 1/8/25.   Monitor nutrition-related labs, bowel function + GI status, and weight trends.      Malnutrition Assessment:  Malnutrition Status:  Severe malnutrition (01/10/25 1426)    Context:  Acute Illness     Findings of the 6 clinical characteristics of malnutrition:  Energy Intake:  50% or less of estimated energy requirements for 5 or more days  Weight Loss:  Greater than 7.5% over 3 months (-12# or 10.1% weight loss since 10/11/24)     Body Fat Loss:  Moderate body fat loss Orbital, Triceps, Buccal region   Muscle Mass Loss:  Moderate muscle mass loss Temples (temporalis), Clavicles (pectoralis & deltoids)  Fluid Accumulation:  No fluid accumulation     Strength:  Not Performed    Nutrition Assessment:    patient remains unchanged from a nutritional standpoint since last RD assessment except that diet was advanced to soft and bite-sized consistency (from FL); patient remains at risk for further compromise d/t inadequate po intake (0%), altered nutrition-related labs, and severe malnutrition diagnosis; will continue ADULT DIET; Dysphagia - Soft and Bite-Sized diet order + Ensure Plus with meals    Nutrition Related Findings:    patient is A & O to person; D5 1/2 NS at 100 ml/hr; patient is consuming 0% of her meals or there is no po intake data documented in flow sheets; + BM on 1/8/25; patient has colace and protonix ordered; plan for outpatient CCY, per GI note Wound Type: Pressure Injury, Stage II (stage 2 PU on buttocks)       Current Nutrition Intake & Therapies:    Average Meal Intake: 0%  Average Supplements Intake: 
Comprehensive Nutrition Assessment    Type and Reason for Visit:  Reassess    Nutrition Recommendations/Plan:   Continue Fill Liquid Diet  Added Ensure Plus TID      Malnutrition Assessment:  Malnutrition Status:  Severe malnutrition (01/08/25 1905)    Context:  Acute Illness     Findings of the 6 clinical characteristics of malnutrition:  Energy Intake:  50% or less of estimated energy requirements for 5 or more days  Weight Loss:  Greater than 2% over 1 week     Body Fat Loss:  Moderate body fat loss Orbital, Buccal region, Triceps   Muscle Mass Loss:  Moderate muscle mass loss Temples (temporalis), Clavicles (pectoralis & deltoids)  Fluid Accumulation:  Unable to assess     Strength:  Not Performed    Nutrition Assessment:    Pt declining  from a nutritional standpoint d/t she is exhibiting physical s/s of sever protein calorie malnutrition AEB > 2% wt loss in < 7 days, intakes < 50% > 5 days, muscle wasting and fat loss.  Remains at risk for further nutritional compromise r/t dysphagia; impaired breathing, and altered nutrition related labs .  Will continue full liquids and add ensure plus TID      Nutrition Related Findings:    pt was awake and A & O x 4 wit he son at the bedside; pt struggles with swallowing and coughing on pudding;  O2/ NC; Na high; K+ Low; H/H low; loose stools noted today; Wound Type: Stage II, Pressure Injury       Current Nutrition Intake & Therapies:    Average Meal Intake: 0%, 1-25%  Average Supplements Intake: None Ordered  ADULT DIET; Full Liquid    Anthropometric Measures:  Height: 162.6 cm (5' 4.02\")  Ideal Body Weight (IBW): 120 lbs (55 kg)    Admission Body Weight: 47.2 kg (104 lb)  Current Body Weight: (S) 44.5 kg (98 lb), 86.7 % IBW. Weight Source: Not specified  Current BMI (kg/m2): 16.8           Weight Adjustment For: No Adjustment                 BMI Categories: Underweight (BMI less than 18.5)    Estimated Daily Nutrient Needs:  Energy Requirements Based On: Kcal/kg 
Comprehensive Nutrition Assessment    Type and Reason for Visit:  Reassess    Nutrition Recommendations/Plan:   Continue TF order - ADULT TUBE FEEDING; Nasogastric tube; Standard with Fiber formula - Jevity 1.5 with a goal rate of 55 ml/hr x 20 hours + 60 ml water flushes every 3 hours for tube patency.   Monitor TF rate, intake, and tolerance + water flushes.   Monitor respiratory status and plan of care.   Please obtain an updated weight for this patient - last weight was obtained on 1/11/25.   Monitor nutrition-related labs, bowel function, swallowing, and weight trends.      Malnutrition Assessment:  Malnutrition Status:  Severe malnutrition (01/14/25 1218)    Context:  Acute Illness     Findings of the 6 clinical characteristics of malnutrition:  Energy Intake:  50% or less of estimated energy requirements for 5 or more days  Weight Loss:  Greater than 7.5% over 3 months (-12# or 10.1% weight loss since 10/11/24)     Body Fat Loss:  Moderate body fat loss Orbital, Triceps, Buccal region   Muscle Mass Loss:  Moderate muscle mass loss Temples (temporalis), Clavicles (pectoralis & deltoids), Hand (interosseous)  Fluid Accumulation:  Mild Extremities (BUE/BLE + 2 pitting edema)   Strength:  Not Performed    Nutrition Assessment:    patient is improving from a nutritional standpoint AEB NG tube was placed and patient is currently receiving TF at goal rate without issue; she remains at risk for further compromise d/t impaired respiratory function, difficulty swallowing, and inadeuate nutrition intake during this admission (prior to TF being started on 1/13/25); will continue Jevity 1.5 with a goal rate of 55 ml/hr x 20 hours + 60 ml water flushes every 3 hours for tube patency    Nutrition Related Findings:    patient is A & O x 2; patient's breathing was not good this am, per Dr. Wilkinson, and he spoke with patient and her IVETTE about intubation; patient was placed on bipap during rounds this am; SLP could not 
Comprehensive Nutrition Assessment    Type and Reason for Visit:  Reassess    Nutrition Recommendations/Plan:   Continue TF regimen - ADULT TUBE FEEDING; Nasogastric tube; Standard with Fiber formula - Jevity 1.5 with a goal rate of 55 ml/hr x 20 hours + 60 ml water flushes every 3 hours for tube patency.   Monitor TF rate, intake, and tolerance + water flushes.   Monitor respiratory status and plan of care.   Monitor nutrition-related labs, bowel function, and weight trends.      Malnutrition Assessment:  Malnutrition Status:  Severe malnutrition (01/17/25 1313)    Context:  Acute Illness     Findings of the 6 clinical characteristics of malnutrition:  Energy Intake:  50% or less of estimated energy requirements for 5 or more days  Weight Loss:  Greater than 7.5% over 3 months (-12# or 10.1% weight loss since 10/11/24)     Body Fat Loss:  Moderate body fat loss Orbital, Triceps, Buccal region   Muscle Mass Loss:  Moderate muscle mass loss Temples (temporalis), Clavicles (pectoralis & deltoids), Hand (interosseous)  Fluid Accumulation:  Mild Extremities (BUE - weeping; BLE - trace edema)   Strength:  Not Performed    Nutrition Assessment:    patient is unchanged from a nutritional standpoint since last RD assessment; patient remains at risk for further compromise d/t impaired respiratory function, need for EN as sole source of nutrition, and difficulty swallowing; will continue Jevity 1.5 with a goal rate of 55 ml/hr x 20 hours + 60 ml water flushes every 3 hours for tube patency    Nutrition Related Findings:    patient is A & O x 2; TF is infusing at goal rate without issue; + BM on 1/17/25; patient did not do well with MBSS on 1/16/24; RN spoke to GI MD today about possible PEG tube placement but GI cannot place PEG tube today since patient received TF overnight and this am; possible PEG tube placement on Monday Wound Type: Pressure Injury, Stage II (stage 2 PU on buttocks)       Current Nutrition Intake & 
Comprehensive Nutrition Assessment    Type and Reason for Visit:  Reassess    Nutrition Recommendations/Plan:   Continue TF regimen - ADULT TUBE FEEDING; PEG tube; Standard with Fiber formula - Jevity 1.5 with a goal rate of 55 ml/hr x 20 hours + 100 ml water flushes every 3 hours for tube patency.   Monitor TF rate, intake, and tolerance + water flushes.   Monitor respiratory status and mental status.   Please obtain an actual, current weight for this patient - last weight was obtained on 1/21/25.   Monitor nutrition-related labs, bowel function, and weight trends.      Malnutrition Assessment:  Malnutrition Status:  Severe malnutrition (01/24/25 1507)    Context:  Acute Illness     Findings of the 6 clinical characteristics of malnutrition:  Energy Intake:  50% or less of estimated energy requirements for 5 or more days  Weight Loss:  Greater than 7.5% over 3 months (-10# or 9.1% weight loss since 10/11/24)     Body Fat Loss:  Moderate body fat loss Orbital, Triceps, Buccal region   Muscle Mass Loss:  Moderate muscle mass loss Temples (temporalis), Clavicles (pectoralis & deltoids), Hand (interosseous)  Fluid Accumulation:  Mild Extremities (BLE + 1 edema)   Strength:  Not Performed    Nutrition Assessment:    patient is unchanged from a nutritional standpoint since last RD assessment; patient remains at risk for further compromise d/t need for EN as sole source of nutrition, wound on buttocks, and impaired respiratory function; will continue Jevity 1.5 with a goal rate of 55 ml/hr x 20 hours + 100 ml every 3 hours for tube patency    Nutrition Related Findings:    patient is A & O x 2; TF is infusing at goal rate without issue; + BM on 1/22/25; Dr. Huber spoke with the family about re-intubation, if necessary, and if the family would want patient to be re-intubated; patient was not very interactive this am during rounds Wound Type: Pressure Injury, Stage II (stage 2 PU on buttocks)       Current Nutrition 
Consent verified for bronchoscopy. Timeout per policy. Procedure performed by Dr. Huber. Bronchoscopy assist performed on 100% FiO2.    3 ml of 1% lidocaine instilled and 30 ml of 0.9% normal saline instilled. Pt tolerated bronchoscopy without difficulty, airway support per RTD. Patient SpO2 within acceptable range throughout bronchoscopy procedure.  Sedation provided/monitored per nurse. No s/s distress, no complications noted.  See physician notes.  Continue plan of care    
Consult called to Dr. Wilkinson regarding consult @ 0210 1/4/2025 isaac moncada  
Critical potassium at 2.4 Dr Spangler made aware and agreed with the PRN replacements already ordered. Pt had 2 more liquid stools watery green.   
Department of Internal Medicine  Nephrology Progress Note        SUBJECTIVE:    We are following this patient for Hypernatremia.    More awake alert and communicative  Responding well with diuretics  Getting potassium replacement  Tolerating tube feeds and free water    ROS: No fever or chills.  Social: + Family at bedside.    Physical Exam:    VITALS:  /61   Pulse 94   Temp 97.4 °F (36.3 °C) (Oral)   Resp 24   Ht 1.626 m (5' 4.02\")   Wt 46.6 kg (102 lb 11.2 oz)   SpO2 97%   BMI 17.62 kg/m²     General appearance: Seems comfortable, no acute distress.  Neck: Trachea midline, thyroid normal.   Lungs:  Non labored breathing, CTA to anterior auscultation.  Heart:  S1S2 normal, rub or gallop. No peripheral edema.  Abdomen: Soft, non-tender, no organomegaly.   Skin: No lesions or rashes, warm to touch.     DATA:    CBC with Differential:    Lab Results   Component Value Date/Time    WBC 6.6 01/17/2025 05:50 AM    RBC 2.95 01/17/2025 05:50 AM    HGB 8.7 01/17/2025 05:50 AM    HCT 26.1 01/17/2025 05:50 AM     01/17/2025 05:50 AM    MCV 88.3 01/17/2025 05:50 AM    MCH 29.5 01/17/2025 05:50 AM    MCHC 33.4 01/17/2025 05:50 AM    RDW 16.0 01/17/2025 05:50 AM    NRBC 1 09/15/2021 05:46 PM    BANDSPCT 1 04/08/2022 04:38 AM    METASPCT 1 04/09/2022 05:15 AM    LYMPHOPCT 5.5 01/17/2025 05:50 AM    MONOPCT 3.9 01/17/2025 05:50 AM    MYELOPCT 1 04/09/2022 05:15 AM    EOSPCT 0.0 01/17/2025 05:50 AM    BASOPCT 0.1 01/17/2025 05:50 AM    MONOSABS 0.3 01/17/2025 05:50 AM    LYMPHSABS 0.4 01/17/2025 05:50 AM    EOSABS 0.0 01/17/2025 05:50 AM    BASOSABS 0.0 01/17/2025 05:50 AM    DIFFTYPE Auto 05/08/2013 06:09 AM     BMP:    Lab Results   Component Value Date/Time     01/17/2025 05:50 AM    K 2.4 01/17/2025 05:50 AM    K 4.1 01/16/2025 07:10 AM    CL 96 01/17/2025 05:50 AM    CO2 40 01/17/2025 05:50 AM    BUN 19 01/17/2025 05:50 AM    CREATININE 1.1 01/17/2025 05:50 AM    CALCIUM 8.8 01/17/2025 05:50 AM    
Department of Internal Medicine  Nephrology Progress Note        SUBJECTIVE:    We are following this patient for Hypernatremia.    More awake alert and communicative  Tolerated of BiPAP  Received Lasix 40 mg IV x 1 on 1/14; Diuril 500 mg IV x 2 on 1/15    ROS: No fever or chills.  Social: + Family at bedside.    Physical Exam:    VITALS:  /68   Pulse 79   Temp 97.3 °F (36.3 °C) (Oral)   Resp 27   Ht 1.626 m (5' 4.02\")   Wt 46.6 kg (102 lb 11.2 oz)   SpO2 100%   BMI 17.62 kg/m²     General appearance: Seems comfortable, no acute distress.  Neck: Trachea midline, thyroid normal.   Lungs:  Non labored breathing, CTA to anterior auscultation.  Heart:  S1S2 normal, rub or gallop. No peripheral edema.  Abdomen: Soft, non-tender, no organomegaly.   Skin: No lesions or rashes, warm to touch.     DATA:    CBC with Differential:    Lab Results   Component Value Date/Time    WBC 4.7 01/16/2025 07:10 AM    RBC 2.96 01/16/2025 07:10 AM    HGB 8.5 01/16/2025 07:10 AM    HGB 8.6 01/16/2025 07:10 AM    HCT 27.4 01/16/2025 07:10 AM    HCT 27.3 01/16/2025 07:10 AM     01/16/2025 07:10 AM    MCV 92.4 01/16/2025 07:10 AM    MCH 29.2 01/16/2025 07:10 AM    MCHC 31.6 01/16/2025 07:10 AM    RDW 17.5 01/16/2025 07:10 AM    NRBC 1 09/15/2021 05:46 PM    BANDSPCT 1 04/08/2022 04:38 AM    METASPCT 1 04/09/2022 05:15 AM    LYMPHOPCT 9.2 01/16/2025 07:10 AM    MONOPCT 4.7 01/16/2025 07:10 AM    MYELOPCT 1 04/09/2022 05:15 AM    EOSPCT 0.0 01/16/2025 07:10 AM    BASOPCT 0.1 01/16/2025 07:10 AM    MONOSABS 0.2 01/16/2025 07:10 AM    LYMPHSABS 0.4 01/16/2025 07:10 AM    EOSABS 0.0 01/16/2025 07:10 AM    BASOSABS 0.0 01/16/2025 07:10 AM    DIFFTYPE Auto 05/08/2013 06:09 AM     BMP:    Lab Results   Component Value Date/Time     01/16/2025 07:10 AM    K 4.1 01/16/2025 07:10 AM     01/16/2025 07:10 AM    CO2 30 01/16/2025 07:10 AM    BUN 17 01/16/2025 07:10 AM    CREATININE 1.0 01/16/2025 07:10 AM    CALCIUM 9.0 
Department of Internal Medicine  Nephrology Progress Note        SUBJECTIVE:    We are following this patient for Hypernatremia.    Patient had hypotensive episode with hemoglobin dropping to 4.4 and subsequently needing intubation along with pressors  She received 3 units of PRBCs and 1 L of saline bolus  Levophed was at peak of 20 and has been weaned down to 15 during my rounds      ROS: No fever or chills.  Social: + Family at bedside.    Physical Exam:    VITALS:  /67   Pulse 69   Temp (!) 92.1 °F (33.4 °C)   Resp 22   Ht 1.626 m (5' 4.02\")   Wt 45.7 kg (100 lb 12.8 oz)   SpO2 100%   BMI 17.29 kg/m²     General appearance: Ill-appearing  Neck: Trachea midline, thyroid normal.   Lungs:  Non labored breathing, CTA to anterior auscultation.  Heart:  S1S2 normal, rub or gallop. + peripheral edema.  Abdomen: Soft, non-tender, no organomegaly.   Skin: No lesions or rashes, warm to touch.     DATA:    CBC with Differential:    Lab Results   Component Value Date/Time    WBC 21.0 01/19/2025 09:45 AM    RBC 1.60 01/19/2025 09:45 AM    HGB 4.4 01/19/2025 09:45 AM    HCT 15.3 01/19/2025 09:45 AM     01/19/2025 09:45 AM    MCV 95.7 01/19/2025 09:45 AM    MCH 27.7 01/19/2025 09:45 AM    MCHC 28.9 01/19/2025 09:45 AM    RDW 17.3 01/19/2025 09:45 AM    NRBC 1 09/15/2021 05:46 PM    BANDSPCT 1 01/19/2025 09:45 AM    METASPCT 1 01/19/2025 09:45 AM    LYMPHOPCT 11.0 01/19/2025 09:45 AM    MONOPCT 1.0 01/19/2025 09:45 AM    MYELOPCT 1 04/09/2022 05:15 AM    EOSPCT 0.0 01/19/2025 09:45 AM    BASOPCT 0.0 01/19/2025 09:45 AM    MONOSABS 0.2 01/19/2025 09:45 AM    LYMPHSABS 2.5 01/19/2025 09:45 AM    EOSABS 0.0 01/19/2025 09:45 AM    BASOSABS 0.0 01/19/2025 09:45 AM    DIFFTYPE Auto 05/08/2013 06:09 AM     BMP:    Lab Results   Component Value Date/Time     01/19/2025 06:40 AM    K 4.3 01/19/2025 06:40 AM    K 4.1 01/16/2025 07:10 AM    CL 99 01/19/2025 06:40 AM    CO2 37 01/19/2025 06:40 AM    BUN 33 
Department of Internal Medicine  Nephrology Progress Note        SUBJECTIVE:    We are following this patient for Hypernatremia.    Patient is on BiPAP  Sodium rising again    ROS: No fever or chills.  Social: + Family at bedside.    Physical Exam:    VITALS:  BP (!) 77/50   Pulse 77   Temp 96.9 °F (36.1 °C) (Temporal)   Resp 19   Ht 1.626 m (5' 4.02\")   Wt 46.6 kg (102 lb 11.2 oz)   SpO2 99%   BMI 17.62 kg/m²     General appearance: Seems comfortable, no acute distress.  Neck: Trachea midline, thyroid normal.   Lungs:  Non labored breathing, CTA to anterior auscultation.  Heart:  S1S2 normal, rub or gallop. No peripheral edema.  Abdomen: Soft, non-tender, no organomegaly.   Skin: No lesions or rashes, warm to touch.     DATA:    CBC with Differential:    Lab Results   Component Value Date/Time    WBC 9.4 01/18/2025 05:40 AM    RBC 2.77 01/18/2025 05:40 AM    HGB 8.1 01/18/2025 05:40 AM    HCT 25.3 01/18/2025 05:40 AM     01/18/2025 05:40 AM    MCV 91.4 01/18/2025 05:40 AM    MCH 29.2 01/18/2025 05:40 AM    MCHC 31.9 01/18/2025 05:40 AM    RDW 16.7 01/18/2025 05:40 AM    NRBC 1 09/15/2021 05:46 PM    BANDSPCT 1 04/08/2022 04:38 AM    METASPCT 1 04/09/2022 05:15 AM    LYMPHOPCT 3.5 01/18/2025 05:40 AM    MONOPCT 1.9 01/18/2025 05:40 AM    MYELOPCT 1 04/09/2022 05:15 AM    EOSPCT 0.0 01/18/2025 05:40 AM    BASOPCT 0.3 01/18/2025 05:40 AM    MONOSABS 0.2 01/18/2025 05:40 AM    LYMPHSABS 0.3 01/18/2025 05:40 AM    EOSABS 0.0 01/18/2025 05:40 AM    BASOSABS 0.0 01/18/2025 05:40 AM    DIFFTYPE Auto 05/08/2013 06:09 AM     BMP:    Lab Results   Component Value Date/Time     01/18/2025 05:40 AM    K 3.6 01/18/2025 05:40 AM    K 4.1 01/16/2025 07:10 AM     01/18/2025 05:40 AM    CO2 42 01/18/2025 05:40 AM    BUN 22 01/18/2025 05:40 AM    CREATININE 1.1 01/18/2025 05:40 AM    CALCIUM 7.9 01/18/2025 05:40 AM    GFRAA 58 04/09/2022 05:15 AM    GFRAA >60 05/08/2013 06:09 AM    LABGLOM 50 01/18/2025 
Department of Internal Medicine  Nephrology Progress Note        SUBJECTIVE:    We are following this patient for Hypernatremia.    Remains on BiPAP  Received Lasix 40 mg IV x 1 on 1/14    ROS: No fever or chills.  Social: No Family at bedside.    Physical Exam:    VITALS:  /61   Pulse 81   Temp 97.6 °F (36.4 °C) (Temporal)   Resp 27   Ht 1.626 m (5' 4.02\")   Wt 49.9 kg (109 lb 14.4 oz)   SpO2 99%   BMI 18.85 kg/m²     General appearance: Seems comfortable, no acute distress.  Neck: Trachea midline, thyroid normal.   Lungs:  Non labored breathing, CTA to anterior auscultation.  Heart:  S1S2 normal, rub or gallop. No peripheral edema.  Abdomen: Soft, non-tender, no organomegaly.   Skin: No lesions or rashes, warm to touch.     DATA:    CBC with Differential:    Lab Results   Component Value Date/Time    WBC 9.8 01/15/2025 01:16 AM    RBC 3.26 01/15/2025 01:16 AM    HGB 9.7 01/15/2025 10:54 AM    HCT 32.7 01/15/2025 10:54 AM     01/15/2025 01:16 AM    MCV 89.8 01/15/2025 01:16 AM    MCH 28.9 01/15/2025 01:16 AM    MCHC 32.2 01/15/2025 01:16 AM    RDW 17.4 01/15/2025 01:16 AM    NRBC 1 09/15/2021 05:46 PM    BANDSPCT 1 04/08/2022 04:38 AM    METASPCT 1 04/09/2022 05:15 AM    LYMPHOPCT 7.8 01/13/2025 05:14 AM    MONOPCT 5.1 01/13/2025 05:14 AM    MYELOPCT 1 04/09/2022 05:15 AM    EOSPCT 0.6 01/13/2025 05:14 AM    BASOPCT 0.1 01/13/2025 05:14 AM    MONOSABS 0.6 01/13/2025 05:14 AM    LYMPHSABS 0.9 01/13/2025 05:14 AM    EOSABS 0.1 01/13/2025 05:14 AM    BASOSABS 0.0 01/13/2025 05:14 AM    DIFFTYPE Auto 05/08/2013 06:09 AM     BMP:    Lab Results   Component Value Date/Time     01/15/2025 01:16 AM    K 3.4 01/15/2025 01:16 AM     01/15/2025 01:16 AM    CO2 37 01/15/2025 01:16 AM    BUN 12 01/15/2025 01:16 AM    CREATININE 1.0 01/15/2025 01:16 AM    CALCIUM 9.2 01/15/2025 01:16 AM    GFRAA 58 04/09/2022 05:15 AM    GFRAA >60 05/08/2013 06:09 AM    LABGLOM 56 01/15/2025 01:16 AM    LABGLOM 50 
Department of Internal Medicine  Nephrology Progress Note        SUBJECTIVE:    We are following this patient for Hypernatremia.  Increasing tachypnea, on BiPAP    ROS: No fever or chills.  Social: Family at bedside.    Physical Exam:    VITALS:  /78   Pulse 95   Temp 97.3 °F (36.3 °C) (Temporal)   Resp 28   Ht 1.626 m (5' 4.02\")   Wt 49.9 kg (109 lb 14.4 oz)   SpO2 100%   BMI 18.85 kg/m²     General appearance: Seems comfortable, no acute distress.  Neck: Trachea midline, thyroid normal.   Lungs:  Non labored breathing, CTA to anterior auscultation.  Heart:  S1S2 normal, rub or gallop. No peripheral edema.  Abdomen: Soft, non-tender, no organomegaly.   Skin: No lesions or rashes, warm to touch.     DATA:    CBC with Differential:    Lab Results   Component Value Date/Time    WBC 11.1 01/13/2025 05:14 AM    RBC 2.36 01/13/2025 05:14 AM    HGB 10.3 01/14/2025 01:00 PM    HCT 33.4 01/14/2025 01:00 PM     01/13/2025 05:14 AM    MCV 93.8 01/13/2025 05:14 AM    MCH 28.6 01/13/2025 05:14 AM    MCHC 30.5 01/13/2025 05:14 AM    RDW 16.2 01/13/2025 05:14 AM    NRBC 1 09/15/2021 05:46 PM    BANDSPCT 1 04/08/2022 04:38 AM    METASPCT 1 04/09/2022 05:15 AM    LYMPHOPCT 7.8 01/13/2025 05:14 AM    MONOPCT 5.1 01/13/2025 05:14 AM    MYELOPCT 1 04/09/2022 05:15 AM    EOSPCT 0.6 01/13/2025 05:14 AM    BASOPCT 0.1 01/13/2025 05:14 AM    MONOSABS 0.6 01/13/2025 05:14 AM    LYMPHSABS 0.9 01/13/2025 05:14 AM    EOSABS 0.1 01/13/2025 05:14 AM    BASOSABS 0.0 01/13/2025 05:14 AM    DIFFTYPE Auto 05/08/2013 06:09 AM     BMP:    Lab Results   Component Value Date/Time     01/14/2025 09:01 AM    K 4.2 01/14/2025 09:01 AM     01/14/2025 09:01 AM    CO2 32 01/14/2025 09:01 AM    BUN 11 01/14/2025 09:01 AM    CREATININE 0.9 01/14/2025 09:01 AM    CALCIUM 9.2 01/14/2025 09:01 AM    GFRAA 58 04/09/2022 05:15 AM    GFRAA >60 05/08/2013 06:09 AM    LABGLOM 63 01/14/2025 09:01 AM    LABGLOM 50 02/27/2024 11:12 AM    
Department of Internal Medicine  Nephrology Progress Note        SUBJECTIVE:    We are following this patient for Hypernatremia.  Resting  On D5W    ROS: No fever or chills.  Social: Family at bedside.    Physical Exam:    VITALS:  /79   Pulse 88   Temp 98.2 °F (36.8 °C) (Oral)   Resp 24   Ht 1.626 m (5' 4.02\")   Wt 49.9 kg (109 lb 14.4 oz)   SpO2 100%   BMI 18.85 kg/m²     General appearance: Seems comfortable, no acute distress.  Neck: Trachea midline, thyroid normal.   Lungs:  Non labored breathing, CTA to anterior auscultation.  Heart:  S1S2 normal, rub or gallop. No peripheral edema.  Abdomen: Soft, non-tender, no organomegaly.   Skin: No lesions or rashes, warm to touch.     DATA:    CBC with Differential:    Lab Results   Component Value Date/Time    WBC 11.1 01/13/2025 05:14 AM    RBC 2.36 01/13/2025 05:14 AM    HGB 9.0 01/13/2025 01:00 PM    HCT 28.8 01/13/2025 01:00 PM     01/13/2025 05:14 AM    MCV 93.8 01/13/2025 05:14 AM    MCH 28.6 01/13/2025 05:14 AM    MCHC 30.5 01/13/2025 05:14 AM    RDW 16.2 01/13/2025 05:14 AM    NRBC 1 09/15/2021 05:46 PM    BANDSPCT 1 04/08/2022 04:38 AM    METASPCT 1 04/09/2022 05:15 AM    LYMPHOPCT 7.8 01/13/2025 05:14 AM    MONOPCT 5.1 01/13/2025 05:14 AM    MYELOPCT 1 04/09/2022 05:15 AM    EOSPCT 0.6 01/13/2025 05:14 AM    BASOPCT 0.1 01/13/2025 05:14 AM    MONOSABS 0.6 01/13/2025 05:14 AM    LYMPHSABS 0.9 01/13/2025 05:14 AM    EOSABS 0.1 01/13/2025 05:14 AM    BASOSABS 0.0 01/13/2025 05:14 AM    DIFFTYPE Auto 05/08/2013 06:09 AM     BMP:    Lab Results   Component Value Date/Time     01/13/2025 01:00 PM    K 3.9 01/13/2025 01:00 PM     01/13/2025 01:00 PM    CO2 27 01/13/2025 01:00 PM    BUN 8 01/13/2025 01:00 PM    CREATININE 0.9 01/13/2025 01:00 PM    CALCIUM 9.1 01/13/2025 01:00 PM    GFRAA 58 04/09/2022 05:15 AM    GFRAA >60 05/08/2013 06:09 AM    LABGLOM 63 01/13/2025 01:00 PM    LABGLOM 50 02/27/2024 11:12 AM    LABGLOM 36 09/14/2023 
Department of Internal Medicine  Nephrology Progress Note        SUBJECTIVE:    We are following this patient for Hypernatremia.  The patient was seen and examined; she feels well today with no CP, SOB, nausea or vomiting.    ROS: No fever or chills.  Social: Family at bedside.    Physical Exam:    VITALS:  /84   Pulse (!) 111   Temp 96.9 °F (36.1 °C) (Temporal)   Resp 30   Ht 1.626 m (5' 4.02\")   Wt 49.9 kg (109 lb 14.4 oz)   SpO2 95%   BMI 18.85 kg/m²     General appearance: Seems comfortable, no acute distress.  Neck: Trachea midline, thyroid normal.   Lungs:  Non labored breathing, CTA to anterior auscultation.  Heart:  S1S2 normal, rub or gallop. No peripheral edema.  Abdomen: Soft, non-tender, no organomegaly.   Skin: No lesions or rashes, warm to touch.     DATA:    CBC with Differential:    Lab Results   Component Value Date/Time    WBC 15.7 01/12/2025 02:51 AM    RBC 2.46 01/12/2025 02:51 AM    HGB 7.1 01/12/2025 02:51 AM    HCT 23.3 01/12/2025 02:51 AM     01/12/2025 02:51 AM    MCV 94.8 01/12/2025 02:51 AM    MCH 28.7 01/12/2025 02:51 AM    MCHC 30.3 01/12/2025 02:51 AM    RDW 16.4 01/12/2025 02:51 AM    NRBC 1 09/15/2021 05:46 PM    BANDSPCT 1 04/08/2022 04:38 AM    METASPCT 1 04/09/2022 05:15 AM    LYMPHOPCT 5.1 01/12/2025 02:51 AM    MONOPCT 3.8 01/12/2025 02:51 AM    MYELOPCT 1 04/09/2022 05:15 AM    EOSPCT 0.2 01/12/2025 02:51 AM    BASOPCT 0.4 01/12/2025 02:51 AM    MONOSABS 0.6 01/12/2025 02:51 AM    LYMPHSABS 0.8 01/12/2025 02:51 AM    EOSABS 0.0 01/12/2025 02:51 AM    BASOSABS 0.1 01/12/2025 02:51 AM    DIFFTYPE Auto 05/08/2013 06:09 AM     BMP:    Lab Results   Component Value Date/Time     01/12/2025 02:51 AM    K 3.9 01/12/2025 02:51 AM     01/12/2025 02:51 AM    CO2 29 01/12/2025 02:51 AM    BUN 11 01/12/2025 02:51 AM    CREATININE 0.9 01/12/2025 02:51 AM    CALCIUM 9.0 01/12/2025 02:51 AM    GFRAA 58 04/09/2022 05:15 AM    GFRAA >60 05/08/2013 06:09 AM    
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 Robertson will evaluate patient  Placed consult to neurology, not able to anticoagulate or ASA due to active bleed  Echocardiogram  Received 3 units of packed RBCs, awaiting repeat H&H  Levophed being titrated down from 25---->10  Discussed with daughter at the bedside, poor prognosis, patient remains full code  
Dr Ni has no further orders  
Dr. Hernandez at the bedside to examine pt. See progress note for details.     Spoke with daughter at bedside. Consult to palliative care for hospice. DNRCC. Do not transfuse  
Dr. Huber at bedside for bronch. Washings and RUL BAL walked to lab by this RN.    Discussion with patient's daughter, patient to now be limited code, no compressions, no meds, no intubation. Dr. Huber states he will update the order for this.   
Dr. PHELPS at bedside, discussed ordered IVF will wait until new lab work comes back prior to starting.   
EKG complete  
EKG obtained and perfect serve message sent to Dr Ni.   
Ett advanced to 24 per md order  
GI is unable to perform the PEG placement today. Heparin gtt resumed at previous rate. Pharmacy made aware.   
General Surgery  Daily Progress Note    Pt Name: Terri Smith  Medical Record Number: [id number]  Date of Birth 1940   Today's Date: 1/8/2025  Admit date: 1/3/2025  LOS: Day 4    SUBJECTIVE  No complaints for me. Abdomen feels okay. Tolerating liquids.       OBJECTIVE  Vitals:    01/08/25 0419 01/08/25 0435 01/08/25 0740 01/08/25 0821   BP:   (!) 161/75    Pulse:  86 70 80   Resp:  25 20 18   Temp:   97.6 °F (36.4 °C)    TempSrc:   Oral    SpO2: 91% 100% 95% 96%   Weight:       Height:           Gen: No distress. Alert.   Resp: Normal rate. Easy and unlabored. No accessory muscle use.  CV: Regular rate. Regular rhythm.   GI: Non-tender. Soft, Non-distended.  Normal bowel sounds.  Skin: Warm and dry. No nodule or rash on exposed extremities.       I/O last 3 completed shifts:  In: 2262.7 [I.V.:1058.5; IV Piggyback:1204.2]  Out: -   No intake/output data recorded.    LABS  CBC:   Recent Labs     01/06/25  0641 01/07/25  0552 01/08/25  0835   WBC 6.1 4.6 9.7   HGB 7.9* 8.0* 8.4*   HCT 26.7* 25.7* 26.8*   .3* 94.8 95.0    398 381     BMP:   Recent Labs     01/06/25  0641 01/07/25  0552 01/08/25  0559 01/08/25  0835    146*  --  148*   K 4.1 3.4* 3.8 3.1*    104  --  108   CO2 29 34*  --  31   BUN 9 10  --  8   CREATININE 1.0 1.2  --  0.9     LIVER PROFILE:   Recent Labs     01/06/25  0641 01/07/25  0552 01/08/25  0835   AST 17 15 15   ALT <5* 9* 6*   BILIDIR  --  <0.1 <0.1   BILITOT <0.2 <0.2 <0.2   ALKPHOS 74 68 64     PT/INR:   Recent Labs     01/07/25  0950   PROTIME 13.9   INR 1.05     APTT: No results for input(s): \"APTT\" in the last 72 hours.  UA:  No results for input(s): \"NITRITE\", \"COLORU\", \"PHUR\", \"LABCAST\", \"WBCUA\", \"RBCUA\", \"MUCUS\", \"TRICHOMONAS\", \"YEAST\", \"BACTERIA\", \"CLARITYU\", \"SPECGRAV\", \"LEUKOCYTESUR\", \"UROBILINOGEN\", \"BILIRUBINUR\", \"BLOODU\", \"GLUCOSEU\", \"AMORPHOUS\" in the last 72 hours.    Invalid input(s): \"KETONESU\"        IMAGING  FL ERCP PANCREAS ONLY S&I 
HEART FAILURE CARE PLAN:    Comorbidities Reviewed: Yes   Patient has a past medical history of NADJA (acute kidney injury) ([facility]), Asthma, Atrial fibrillation with RVR ([facility]), CAD (coronary artery disease), CHF (congestive heart failure) ([facility]), Chronic anxiety, COPD (chronic obstructive pulmonary disease) ([facility]), COPD (chronic obstructive pulmonary disease) ([facility]), GERD (gastroesophageal reflux disease), Heart attack ([facility]), History of blood transfusion, Hyperlipidemia, Hypertension, MRSA (methicillin resistant staph aureus) culture positive, OA (osteoarthritis), and Thyroid disease.     Weights Reviewed: Yes   Admission weight: 47.2 kg (104 lb)   Wt Readings from Last 3 Encounters:   01/19/25 45.7 kg (100 lb 12.8 oz)   12/21/24 47.4 kg (104 lb 8 oz)   12/03/24 45.5 kg (100 lb 5 oz)     Intake & Output Reviewed: Yes     Intake/Output Summary (Last 24 hours) at 1/19/2025 0807  Last data filed at 1/19/2025 0646  Gross per 24 hour   Intake 1842.93 ml   Output --   Net 1842.93 ml       ECHOCARDIOGRAM Reviewed: Yes   Patient's Ejection Fraction (EF) is greater than 40%     Medications Reviewed: Yes   SCHEDULED HOSPITAL MEDICATIONS:   lidocaine  1 patch TransDERmal Daily    methylPREDNISolone  40 mg IntraVENous Q12H    pantoprazole (PROTONIX) 40 mg in sodium chloride (PF) 0.9 % 10 mL injection  40 mg IntraVENous BID    busPIRone  7.5 mg Oral BID    miconazole nitrate   Topical BID    ipratropium 0.5 mg-albuterol 2.5 mg  1 Dose Inhalation 4x Daily RT    sodium chloride flush  5-40 mL IntraVENous 2 times per day    atorvastatin  20 mg Oral Nightly    budesonide  0.5 mg Nebulization BID RT    docusate sodium  100 mg Oral Daily    [Held by provider] apixaban  2.5 mg Oral BID    escitalopram  10 mg Oral Daily    [Held by provider] gabapentin  100 mg Oral TID    levothyroxine  25 mcg Oral Daily    magnesium oxide  400 mg Oral Daily    metoprolol tartrate  12.5 mg Oral BID     HOME MEDICATIONS:  Prior to Admission medications  
HEART FAILURE CARE PLAN:    Comorbidities Reviewed: Yes   Patient has a past medical history of NADJA (acute kidney injury) ([facility]), Asthma, Atrial fibrillation with RVR ([facility]), CAD (coronary artery disease), CHF (congestive heart failure) ([facility]), Chronic anxiety, COPD (chronic obstructive pulmonary disease) ([facility]), COPD (chronic obstructive pulmonary disease) ([facility]), GERD (gastroesophageal reflux disease), Heart attack ([facility]), History of blood transfusion, Hyperlipidemia, Hypertension, MRSA (methicillin resistant staph aureus) culture positive, OA (osteoarthritis), and Thyroid disease.     Weights Reviewed: Yes   Admission weight: 47.2 kg (104 lb)   Wt Readings from Last 3 Encounters:   01/19/25 45.7 kg (100 lb 12.8 oz)   12/21/24 47.4 kg (104 lb 8 oz)   12/03/24 45.5 kg (100 lb 5 oz)     Intake & Output Reviewed: Yes     Intake/Output Summary (Last 24 hours) at 1/20/2025 0636  Last data filed at 1/20/2025 0603  Gross per 24 hour   Intake 2876.57 ml   Output 44 ml   Net 2832.57 ml       ECHOCARDIOGRAM Reviewed: Yes   Patient's Ejection Fraction (EF) is greater than 40%     Medications Reviewed: Yes   SCHEDULED HOSPITAL MEDICATIONS:   sodium chloride  500 mL IntraVENous Once    cefepime  1,000 mg IntraVENous Q12H    ketamine  1 mg/kg IntraVENous Once    vancomycin (VANCOCIN) intermittent dosing (placeholder)   Other RX Placeholder    sodium phosphate IVPB (CENTRAL line)  30 mmol IntraVENous Once    metroNIDAZOLE  500 mg IntraVENous Q8H    lidocaine  1 patch TransDERmal Daily    methylPREDNISolone  40 mg IntraVENous Q12H    pantoprazole (PROTONIX) 40 mg in sodium chloride (PF) 0.9 % 10 mL injection  40 mg IntraVENous BID    busPIRone  7.5 mg Oral BID    miconazole nitrate   Topical BID    ipratropium 0.5 mg-albuterol 2.5 mg  1 Dose Inhalation 4x Daily RT    sodium chloride flush  5-40 mL IntraVENous 2 times per day    atorvastatin  20 mg Oral Nightly    budesonide  0.5 mg Nebulization BID RT    docusate sodium  100 mg Oral 
[facility]    Met with patient's daughter Juan and had two sons Shane and Johnathan on speaker phone to discuss hospice philosophy and services.  They are agreeable to withdrawal of care including continuous BiPAP, tube feeding, testing.  Family also agreeable to proceed with HOC services.  We discussed keeping patient here in partnership as she will likely be too unstable to move to an inpatient unit.    Talked to nurse who had talked to Dr. Hernandez.  Nurse said that Dr. RUBIO would put in withdrawal of care and comfort orders, see how patient did for a few hours and make decision to partner patient with HOC.     Hospital  contacted for support of patient and family.  HOC will follow up tomorrow.  Updated daughter Juan, nurse and PC nurse Chrissy and ADDISON Echavarria.   
Heparin gtt stopped at this time per MD orders for possible PEG placement. Awaiting word back from GI as to whether they would consider do the PEG today. Pharmacy made aware of pausing heparin gtt for now.  
[facility]   HEART FAILURE PROGRAM      NAME:  Terri Smith  AGE: 84 y.o.   GENDER: female  : 1940  TODAY'S DATE:  2025    Subjective:     VISIT TYPE: Education    ADMIT DATE: 1/3/2025    PAST MEDICAL HISTORY:      Diagnosis Date    NADJA (acute kidney injury) ([facility])     Asthma     Atrial fibrillation with RVR ([facility])     CAD (coronary artery disease)     CHF (congestive heart failure) ([facility])     unsure    Chronic anxiety     COPD (chronic obstructive pulmonary disease) ([facility])     COPD (chronic obstructive pulmonary disease) ([facility]) 2023    GERD (gastroesophageal reflux disease) 2021    Heart attack ([facility])     History of blood transfusion     Hyperlipidemia     Hypertension     MRSA (methicillin resistant staph aureus) culture positive 2019    + resp cx    OA (osteoarthritis) 2014    Thyroid disease      HOME MEDICATIONS:  Prior to Admission medications    Medication Sig Start Date End Date Taking? Authorizing Provider   meclizine (ANTIVERT) 12.5 MG tablet Take 1 tablet by mouth 3 times daily as needed for Dizziness 25 Yes Popeye Madison MD   predniSONE (DELTASONE) 10 MG tablet 4 tabs for 3 days 3 tabs for 3 days 2 tabs for 3 days 1 tabs for 3 days 25  Yes Popeye Madison MD   amoxicillin-clavulanate (AUGMENTIN) 500-125 MG per tablet Take 1 tablet by mouth 3 times daily for 5 days 25 Yes Popeye Madison MD   busPIRone (BUSPAR) 15 MG tablet Take 15 mg by mouth 2 times daily Indications: Feeling Anxious   Yes ProviderCharmaine MD   gabapentin (NEURONTIN) 100 MG capsule Take 1 capsule by mouth 3 times daily.   Yes Charmaine Gant MD   guaiFENesin (MUCINEX) 600 MG extended release tablet Take 2 tablets by mouth 2 times daily   Yes Charmaine Gant MD   magnesium oxide (MAG-OX) 400 (240 Mg) MG tablet Take 1 tablet by mouth daily   Yes Charmaine Gant MD   furosemide (LASIX) 20 MG tablet Take 
IM Progress Note    Admit Date:  1/3/2025        Subjective:  Ms. Smith denies any new complains. ERCP done and results noted.          Objective:   BP (!) 133/101   Pulse 100   Temp 98 °F (36.7 °C) (Oral)   Resp 20   Ht 1.626 m (5' 4.02\")   Wt 44.9 kg (98 lb 14.4 oz)   SpO2 91%   BMI 16.97 kg/m²         No intake or output data in the 24 hours ending 01/09/25 1040        Physical Exam:  Gen: No distress. Alert. Appears chronically ill, thin, pleasant elderly female.  Awake alert and oriented   Eyes: PERRL. No sclera icterus. No conjunctival injection.   ENT: No discharge. Pharynx clear.   Neck: No JVD.  Trachea midline.  Resp: No accessory muscle use. No crackles. No wheezes. No rhonchi.  Diminished breath sounds  CV: Regular rate. Regular rhythm. No murmur.  No rub. No edema.   Chronic venous stasis changes  +dressing to LLE  Capillary Refill: Brisk,< 3 seconds   Peripheral Pulses: +2 palpable, equal bilaterally   GI: mild epigastric tenderness. Non-distended. Normal bowel sounds.   Skin: Warm and dry. No nodule on exposed extremities. No rash on exposed extremities.   M/S: No cyanosis. No joint deformity. No clubbing.   Neuro: Awake. Grossly nonfocal    Psych: Oriented x 3. No anxiety or agitation.           Medications:  amoxicillin-clavulanate, 1 tablet, 2 times per day  guaiFENesin, 600 mg, BID  ipratropium 0.5 mg-albuterol 2.5 mg, 1 Dose, 4x Daily RT  predniSONE, 40 mg, Daily  sodium chloride flush, 5-40 mL, 2 times per day  atorvastatin, 20 mg, Nightly  budesonide, 0.5 mg, BID RT  busPIRone, 15 mg, BID  docusate sodium, 100 mg, Daily  apixaban, 2.5 mg, BID  escitalopram, 10 mg, Daily  furosemide, 20 mg, Daily  gabapentin, 100 mg, TID  levothyroxine, 25 mcg, Daily  magnesium oxide, 400 mg, Daily  metoprolol tartrate, 12.5 mg, BID  pantoprazole, 40 mg, BID  Roflumilast, 500 mcg, Daily      PRN Medications:  HYDROmorphone, 0.5 mg, Q4H PRN  sodium chloride flush, 5-40 mL, PRN  sodium chloride, , 
IM Progress Note    Admit Date:  1/3/2025        Subjective:  Ms. Smith denies any new complains. ERCP done and results noted.    Objective:   BP (!) 161/75   Pulse 80   Temp 97.6 °F (36.4 °C) (Oral)   Resp 18   Ht 1.626 m (5' 4.02\")   Wt 44.9 kg (98 lb 14.4 oz)   SpO2 96%   BMI 16.97 kg/m²           Intake/Output Summary (Last 24 hours) at 1/8/2025 1329  Last data filed at 1/8/2025 1021  Gross per 24 hour   Intake 2502.65 ml   Output --   Net 2502.65 ml         Physical Exam:  Gen: No distress. Alert. Appears chronically ill, thin, pleasant elderly female.  Awake alert and oriented   Eyes: PERRL. No sclera icterus. No conjunctival injection.   ENT: No discharge. Pharynx clear.   Neck: No JVD.  Trachea midline.  Resp: No accessory muscle use. No crackles. No wheezes. No rhonchi.  Diminished breath sounds  CV: Regular rate. Regular rhythm. No murmur.  No rub. No edema.   Chronic venous stasis changes  +dressing to LLE  Capillary Refill: Brisk,< 3 seconds   Peripheral Pulses: +2 palpable, equal bilaterally   GI: mild epigastric tenderness. Non-distended. Normal bowel sounds.   Skin: Warm and dry. No nodule on exposed extremities. No rash on exposed extremities.   M/S: No cyanosis. No joint deformity. No clubbing.   Neuro: Awake. Grossly nonfocal    Psych: Oriented x 3. No anxiety or agitation.           Medications:  potassium chloride, 40 mEq, Once  guaiFENesin, 600 mg, BID  ipratropium 0.5 mg-albuterol 2.5 mg, 1 Dose, 4x Daily RT  predniSONE, 40 mg, Daily  piperacillin-tazobactam, 3,375 mg, Q8H  sodium chloride flush, 5-40 mL, 2 times per day  atorvastatin, 20 mg, Nightly  budesonide, 0.5 mg, BID RT  busPIRone, 15 mg, BID  docusate sodium, 100 mg, Daily  apixaban, 2.5 mg, BID  escitalopram, 10 mg, Daily  furosemide, 20 mg, Daily  gabapentin, 100 mg, TID  levothyroxine, 25 mcg, Daily  magnesium oxide, 400 mg, Daily  metoprolol tartrate, 12.5 mg, BID  pantoprazole, 40 mg, BID  Roflumilast, 500 mcg, 
IM Progress Note    Admit Date:  1/3/2025      Choledocholithiasis  General surgery and GI following   Plan ERCP    Subjective:  Ms. Smith denies any new complains. Plans for ERCP today. No fever or chills.    Objective:   Patient Vitals for the past 4 hrs:   BP Temp Temp src Pulse Resp SpO2   01/07/25 0816 (!) 140/83 97.5 °F (36.4 °C) Oral 88 18 98 %   01/07/25 0731 -- -- -- 89 18 100 %          Intake/Output Summary (Last 24 hours) at 1/7/2025 1002  Last data filed at 1/7/2025 0854  Gross per 24 hour   Intake 0 ml   Output --   Net 0 ml         Physical Exam:  Gen: No distress. Alert. Appears chronically ill, thin, pleasant elderly female.  Awake alert and oriented   Eyes: PERRL. No sclera icterus. No conjunctival injection.   ENT: No discharge. Pharynx clear.   Neck: No JVD.  Trachea midline.  Resp: No accessory muscle use. No crackles. No wheezes. No rhonchi.  Diminished breath sounds  CV: Regular rate. Regular rhythm. No murmur.  No rub. No edema.   Chronic venous stasis changes  +dressing to LLE  Capillary Refill: Brisk,< 3 seconds   Peripheral Pulses: +2 palpable, equal bilaterally   GI: mild epigastric tenderness. Non-distended. Normal bowel sounds.   Skin: Warm and dry. No nodule on exposed extremities. No rash on exposed extremities.   M/S: No cyanosis. No joint deformity. No clubbing.   Neuro: Awake. Grossly nonfocal    Psych: Oriented x 3. No anxiety or agitation.           Medications:  guaiFENesin, 600 mg, BID  ipratropium 0.5 mg-albuterol 2.5 mg, 1 Dose, 4x Daily RT  predniSONE, 40 mg, Daily  piperacillin-tazobactam, 3,375 mg, Q8H  sodium chloride flush, 5-40 mL, 2 times per day  atorvastatin, 20 mg, Nightly  budesonide, 0.5 mg, BID RT  busPIRone, 15 mg, BID  docusate sodium, 100 mg, Daily  [Held by provider] apixaban, 2.5 mg, BID  escitalopram, 10 mg, Daily  [Held by provider] furosemide, 20 mg, Daily  gabapentin, 100 mg, TID  levothyroxine, 25 mcg, Daily  magnesium oxide, 400 mg, 
IM Progress Note    Admit Date:  1/3/2025      Choledocholithiasis  General surgery and GI following   Plan ERCP    Subjective:  Ms. Smith seen.  Looks ill and fatigued but in no distress.  Has BiPAP mask on. .    This morning patient was using her home AVAPS unit and did not tolerated well, she was getting hypoxic.   Switched to hospital BiPAP unit and she is tolerating it well now.    Denies any abdominal pain now    Objective:   Patient Vitals for the past 4 hrs:   BP Temp Temp src Pulse Resp SpO2   01/05/25 1512 -- -- -- 87 24 95 %   01/05/25 1500 -- -- -- 80 18 98 %   01/05/25 1400 (!) 146/75 97.6 °F (36.4 °C) Oral 83 18 100 %          Intake/Output Summary (Last 24 hours) at 1/5/2025 1739  Last data filed at 1/5/2025 0447  Gross per 24 hour   Intake 350 ml   Output 1 ml   Net 349 ml       Physical Exam:  Gen: No distress. Alert. Appears chronically ill, thin, pleasant elderly female.  Awake alert and oriented and currently with BiPAP mask on  Eyes: PERRL. No sclera icterus. No conjunctival injection.   ENT: No discharge. Pharynx clear.   Neck: No JVD.  Trachea midline.  Resp: No accessory muscle use. No crackles. No wheezes. No rhonchi.  Diminished breath sounds  CV: Regular rate. Regular rhythm. No murmur.  No rub. No edema.   Chronic venous stasis changes  +dressing to LLE  Capillary Refill: Brisk,< 3 seconds   Peripheral Pulses: +2 palpable, equal bilaterally   GI: Non-tender. Non-distended. Normal bowel sounds.   Skin: Warm and dry. No nodule on exposed extremities. No rash on exposed extremities.   M/S: No cyanosis. No joint deformity. No clubbing.   Neuro: Awake. Grossly nonfocal    Psych: Oriented x 3. No anxiety or agitation.           Medications:  guaiFENesin, 600 mg, BID  ipratropium 0.5 mg-albuterol 2.5 mg, 1 Dose, 4x Daily RT  predniSONE, 40 mg, Daily  piperacillin-tazobactam, 3,375 mg, Q8H  sodium chloride flush, 5-40 mL, 2 times per day  atorvastatin, 20 mg, Nightly  budesonide, 0.5 mg, BID 
IM Progress Note    Admit Date:  1/3/2025      Choledocholithiasis  General surgery and GI following   Plan ERCP    Subjective:  Ms. Smith seen.  Looks ill and fatigued but in no distress. .    Having mild abdominal pain.    Objective:   Patient Vitals for the past 4 hrs:   SpO2   01/06/25 1059 98 %   01/06/25 0802 98 %        No intake or output data in the 24 hours ending 01/06/25 1102      Physical Exam:  Gen: No distress. Alert. Appears chronically ill, thin, pleasant elderly female.  Awake alert and oriented   Eyes: PERRL. No sclera icterus. No conjunctival injection.   ENT: No discharge. Pharynx clear.   Neck: No JVD.  Trachea midline.  Resp: No accessory muscle use. No crackles. No wheezes. No rhonchi.  Diminished breath sounds  CV: Regular rate. Regular rhythm. No murmur.  No rub. No edema.   Chronic venous stasis changes  +dressing to LLE  Capillary Refill: Brisk,< 3 seconds   Peripheral Pulses: +2 palpable, equal bilaterally   GI: mild epigastric tenderness. Non-distended. Normal bowel sounds.   Skin: Warm and dry. No nodule on exposed extremities. No rash on exposed extremities.   M/S: No cyanosis. No joint deformity. No clubbing.   Neuro: Awake. Grossly nonfocal    Psych: Oriented x 3. No anxiety or agitation.           Medications:  guaiFENesin, 600 mg, BID  ipratropium 0.5 mg-albuterol 2.5 mg, 1 Dose, 4x Daily RT  predniSONE, 40 mg, Daily  piperacillin-tazobactam, 3,375 mg, Q8H  sodium chloride flush, 5-40 mL, 2 times per day  atorvastatin, 20 mg, Nightly  budesonide, 0.5 mg, BID RT  busPIRone, 15 mg, BID  docusate sodium, 100 mg, Daily  [Held by provider] apixaban, 2.5 mg, BID  escitalopram, 10 mg, Daily  [Held by provider] furosemide, 20 mg, Daily  gabapentin, 100 mg, TID  levothyroxine, 25 mcg, Daily  magnesium oxide, 400 mg, Daily  metoprolol tartrate, 12.5 mg, BID  pantoprazole, 40 mg, BID  Roflumilast, 500 mcg, Daily      PRN Medications:  HYDROmorphone, 0.5 mg, Q4H PRN  sodium chloride 
IM Progress Note    Admit Date:  1/3/2025    Choledocholithiasis s.p ERCP         Subjective:  Ms. Smith seen drowsy on bipap , comfortable   Moving all ext    Developed hypoxia, tachycardia needing ICU transfer   Possible aspiration and also has significant edema  Given lasix IV with improving imaging findings            Objective:   BP (!) 142/86   Pulse (!) 130   Temp 98.2 °F (36.8 °C) (Axillary)   Resp 30   Ht 1.626 m (5' 4.02\")   Wt 44.9 kg (98 lb 14.4 oz)   SpO2 100%   BMI 16.97 kg/m²         No intake or output data in the 24 hours ending 01/10/25 0710        Physical Exam:      General:  thin elderly female ill appearing on bipap comfortable  . Appears to be not in any distress  Mucous Membranes:  Pink , anicteric  Neck: No JVD, no carotid bruit, no thyromegaly  Chest:  Clear to auscultation bilaterally, diminished with minimal crackles   Cardiovascular:  RRR S1S2 heard, no murmurs or gallops  Abdomen:  Soft, undistended, non tender, no organomegaly, BS present  Extremities: scattered skin tears and ecchymoses  Edema to both LE 1+ No edema or cyanosis. Distal pulses well felt  Neurological :drowsy on bipap but moving all ext to commands  Unable to examine much       Medications:  amoxicillin-clavulanate, 1 tablet, 2 times per day  guaiFENesin, 600 mg, BID  ipratropium 0.5 mg-albuterol 2.5 mg, 1 Dose, 4x Daily RT  predniSONE, 40 mg, Daily  sodium chloride flush, 5-40 mL, 2 times per day  atorvastatin, 20 mg, Nightly  budesonide, 0.5 mg, BID RT  busPIRone, 15 mg, BID  docusate sodium, 100 mg, Daily  apixaban, 2.5 mg, BID  escitalopram, 10 mg, Daily  furosemide, 20 mg, Daily  gabapentin, 100 mg, TID  levothyroxine, 25 mcg, Daily  magnesium oxide, 400 mg, Daily  metoprolol tartrate, 12.5 mg, BID  pantoprazole, 40 mg, BID  Roflumilast, 500 mcg, Daily      PRN Medications:  HYDROmorphone, 0.5 mg, Q4H PRN  sodium chloride flush, 5-40 mL, PRN  sodium chloride, , PRN  magnesium sulfate, 2,000 mg, 
IM Progress Note    Admit Date:  1/3/2025    Choledocholithiasis s.p ERCP with stone removal     1/9  Developed hypoxia, tachycardia needing ICU transfer   Possible aspiration and also has significant edema  Given lasix IV with improving imaging findings        Subjective:  Ms. Smith seen awake, alert and oriented. Wants to eat and drink today   Used AVAPS overnight , now on 4 L , uses 2.5 L at home  Minimal cough  No fevers            Objective:   /78   Pulse (!) 103   Temp 97.1 °F (36.2 °C) (Temporal)   Resp 27   Ht 1.626 m (5' 4.02\")   Wt 49.9 kg (109 lb 14.4 oz)   SpO2 100%   BMI 18.85 kg/m²           Intake/Output Summary (Last 24 hours) at 1/11/2025 0904  Last data filed at 1/11/2025 0450  Gross per 24 hour   Intake 3268.39 ml   Output --   Net 3268.39 ml           Physical Exam:      General:  thin elderly female ill appearing on  NC o2   . Appears to be not in any distress  Mucous Membranes:  Pink , anicteric  Neck: No JVD, no carotid bruit, no thyromegaly  Chest:  Clear to auscultation bilaterally, diminished with minimal crackles   Cardiovascular:  RRR S1S2 heard, no murmurs or gallops  Abdomen:  Soft, undistended, non tender, no organomegaly, BS present  Extremities: scattered skin tears and ecchymoses  Edema to both LE 1+ No edema or cyanosis. Distal pulses well felt  Neurological :awake, alert and oriented with gen weakness      Medications:  potassium chloride, 40 mEq, Daily  piperacillin-tazobactam, 3,375 mg, Q8H  mupirocin, , BID  lidocaine 1 % injection, 50 mg, Once  miconazole nitrate, , BID  metoprolol, 5 mg, Q8H  guaiFENesin, 600 mg, BID  ipratropium 0.5 mg-albuterol 2.5 mg, 1 Dose, 4x Daily RT  predniSONE, 40 mg, Daily  sodium chloride flush, 5-40 mL, 2 times per day  atorvastatin, 20 mg, Nightly  budesonide, 0.5 mg, BID RT  [Held by provider] busPIRone, 15 mg, BID  docusate sodium, 100 mg, Daily  apixaban, 2.5 mg, BID  [Held by provider] escitalopram, 10 mg, Daily  [Held by 
IM Progress Note    Admit Date:  1/3/2025    Choledocholithiasis s.p ERCP with stone removal     1/9  Developed hypoxia, tachycardia needing ICU transfer   Possible aspiration and also has significant edema  Given lasix IV with improving imaging findings      1/12  Subjective:  Ms. Smith seen fatiged , awake in ICU bed  Did not do well overnight with progressive weakness and dyspnea  Used AVAPS overnight , now on 4 L plans to swithc back to AVAPS  Minimal cough  No fevers            Objective:   /84   Pulse (!) 111   Temp 96.9 °F (36.1 °C) (Temporal)   Resp 30   Ht 1.626 m (5' 4.02\")   Wt 49.9 kg (109 lb 14.4 oz)   SpO2 95%   BMI 18.85 kg/m²           Intake/Output Summary (Last 24 hours) at 1/12/2025 0951  Last data filed at 1/12/2025 0440  Gross per 24 hour   Intake 319.56 ml   Output 650 ml   Net -330.44 ml           Physical Exam:      General:  thin elderly female ill appearing on  NC o2   . Appears to be not in any distress    Mucous Membranes:  Pink , anicteric  Neck: No JVD, no carotid bruit, no thyromegaly  Chest:  + accessory muscle use Clear to auscultation bilaterally, diminished with minimal crackles   Cardiovascular:  RRR S1S2 heard, no murmurs or gallops  Abdomen:  Soft, undistended, non tender, no organomegaly, BS present  Extremities: scattered skin tears and ecchymoses  Edema to both LE 1+ No edema or cyanosis. Distal pulses well felt  Neurological :awake, alert and oriented with gen weakness      Medications:  predniSONE, 20 mg, Daily  busPIRone, 7.5 mg, BID  piperacillin-tazobactam, 3,375 mg, Q8H  mupirocin, , BID  miconazole nitrate, , BID  guaiFENesin, 600 mg, BID  ipratropium 0.5 mg-albuterol 2.5 mg, 1 Dose, 4x Daily RT  sodium chloride flush, 5-40 mL, 2 times per day  atorvastatin, 20 mg, Nightly  budesonide, 0.5 mg, BID RT  docusate sodium, 100 mg, Daily  apixaban, 2.5 mg, BID  escitalopram, 10 mg, Daily  [Held by provider] gabapentin, 100 mg, TID  levothyroxine, 25 mcg, 
IM Progress Note    Admit Date:  1/3/2025    Choledocholithiasis s.p ERCP with stone removal     1/9  Developed hypoxia, tachycardia needing ICU transfer   Possible aspiration and also has significant edema  Given lasix IV with improving imaging findings      1/12  Subjective:  Ms. Smith seen fatiged , awake in ICU bed  Did not do well overnight with progressive weakness and dyspnea  Used AVAPS overnight , now on 4 L plans to swithc back to AVAPS  Minimal cough  No fevers      1/13- seen on Bipap. H and H low. Has a NG tube but tube feeds have not started. On AVAPS overnight.       Objective:   /69   Pulse 79   Temp 98.2 °F (36.8 °C) (Oral)   Resp 20   Ht 1.626 m (5' 4.02\")   Wt 49.9 kg (109 lb 14.4 oz)   SpO2 100%   BMI 18.85 kg/m²           Intake/Output Summary (Last 24 hours) at 1/13/2025 1353  Last data filed at 1/13/2025 1210  Gross per 24 hour   Intake 2734.58 ml   Output 500 ml   Net 2234.58 ml           Physical Exam:      General:  thin elderly female ill appearing on AVAPS  . Appears to be not in any distress    Mucous Membranes:  Pink , anicteric  Neck: No JVD, no carotid bruit, no thyromegaly  Chest:  + accessory muscle use Clear to auscultation bilaterally, diminished with minimal crackles   Cardiovascular:  RRR S1S2 heard, no murmurs or gallops  Abdomen:  Soft, undistended, non tender, no organomegaly, BS present  Extremities: scattered skin tears and ecchymoses  Edema to both LE 1+ No edema or cyanosis. Distal pulses well felt  Neurological :awake, alert and oriented with gen weakness      Medications:  magnesium sulfate, 1,000 mg, Once  pantoprazole (PROTONIX) 40 mg in sodium chloride (PF) 0.9 % 10 mL injection, 40 mg, BID  predniSONE, 20 mg, Daily  busPIRone, 7.5 mg, BID  piperacillin-tazobactam, 3,375 mg, Q8H  mupirocin, , BID  miconazole nitrate, , BID  guaiFENesin, 600 mg, BID  ipratropium 0.5 mg-albuterol 2.5 mg, 1 Dose, 4x Daily RT  sodium chloride flush, 5-40 mL, 2 times per 
IM Progress Note    Admit Date:  1/3/2025    Choledocholithiasis s.p ERCP with stone removal     1/9  Developed hypoxia, tachycardia needing ICU transfer   Possible aspiration and also has significant edema  Given lasix IV with improving imaging findings      1/12  Subjective:  Ms. Smith seen fatiged , awake in ICU bed  Did not do well overnight with progressive weakness and dyspnea  Used AVAPS overnight , now on 4 L plans to swithc back to AVAPS  Minimal cough  No fevers      1/13- seen on Bipap. H and H low. Has a NG tube but tube feeds have not started. On AVAPS overnight.     1/14-patient did not use AVAPS last night.  On 6 L.  More short of breath.  No tachypnea.  Mentation slightly worse.      Objective:   BP (!) 123/94   Pulse 97   Temp 96.9 °F (36.1 °C) (Axillary)   Resp (!) 34   Ht 1.626 m (5' 4.02\")   Wt 49.9 kg (109 lb 14.4 oz)   SpO2 99%   BMI 18.85 kg/m²           Intake/Output Summary (Last 24 hours) at 1/14/2025 1355  Last data filed at 1/14/2025 1157  Gross per 24 hour   Intake 2596.53 ml   Output --   Net 2596.53 ml           Physical Exam:      General:  thin elderly female ill appearing nasal cannula oxygen  In mild distress  Mucous Membranes:  Pink , anicteric  Neck: No JVD, no carotid bruit, no thyromegaly  Chest: + Accessory muscle use.  Bilateral wheezes.  Few crackles  Cardiovascular:  RRR S1S2 heard, no murmurs or gallops  Abdomen:  Soft, undistended, non tender, no organomegaly, BS present  Extremities: scattered skin tears and ecchymoses  Edema to both LE 1+ No edema or cyanosis. Distal pulses well felt  Neurological :awake, alert and oriented with gen weakness      Medications:  methylPREDNISolone, 40 mg, Q12H  pantoprazole (PROTONIX) 40 mg in sodium chloride (PF) 0.9 % 10 mL injection, 40 mg, BID  busPIRone, 7.5 mg, BID  piperacillin-tazobactam, 3,375 mg, Q8H  mupirocin, , BID  miconazole nitrate, , BID  ipratropium 0.5 mg-albuterol 2.5 mg, 1 Dose, 4x Daily RT  sodium chloride 
IM Progress Note    Admit Date:  1/3/2025    Choledocholithiasis s.p ERCP with stone removal     1/9  Developed hypoxia, tachycardia needing ICU transfer   Possible aspiration and also has significant edema  Given lasix IV with improving imaging findings      1/12  Subjective:  Ms. Smith seen fatiged , awake in ICU bed  Did not do well overnight with progressive weakness and dyspnea  Used AVAPS overnight , now on 4 L plans to swithc back to AVAPS  Minimal cough  No fevers      1/13- seen on Bipap. H and H low. Has a NG tube but tube feeds have not started. On AVAPS overnight.     1/14-patient did not use AVAPS last night.  On 6 L.  More short of breath.  No tachypnea.  Mentation slightly worse.    1/15-was on AVAPS all day and night.  6 L of oxygen this morning after I saw her.      Objective:   /61   Pulse 81   Temp 97.6 °F (36.4 °C) (Temporal)   Resp 27   Ht 1.626 m (5' 4.02\")   Wt 49.9 kg (109 lb 14.4 oz)   SpO2 99%   BMI 18.85 kg/m²           Intake/Output Summary (Last 24 hours) at 1/15/2025 1401  Last data filed at 1/15/2025 0553  Gross per 24 hour   Intake 366.02 ml   Output 400 ml   Net -33.98 ml           Physical Exam:      General:  thin elderly female ill appearing on AVAPS.  I transition her to nasal cannula.  Mucous Membranes:  Pink , anicteric  Neck: No JVD, no carotid bruit, no thyromegaly  Chest: + Accessory muscle use.  Bilateral wheezes.  Few crackles  Cardiovascular:  RRR S1S2 heard, no murmurs or gallops  Abdomen:  Soft, undistended, non tender, no organomegaly, BS present  Extremities: scattered skin tears and ecchymoses  Edema to both LE 1+ No edema or cyanosis. Distal pulses well felt  Neurological :awake, alert and oriented with gen weakness      Medications:  methylPREDNISolone, 40 mg, Q12H  pantoprazole (PROTONIX) 40 mg in sodium chloride (PF) 0.9 % 10 mL injection, 40 mg, BID  busPIRone, 7.5 mg, BID  piperacillin-tazobactam, 3,375 mg, Q8H  miconazole nitrate, , 
IM Progress Note    Admit Date:  1/3/2025    Choledocholithiasis s.p ERCP with stone removal     1/9  Developed hypoxia, tachycardia needing ICU transfer   Possible aspiration and also has significant edema  Given lasix IV with improving imaging findings      1/12  Subjective:  Ms. Smith seen fatiged , awake in ICU bed  Did not do well overnight with progressive weakness and dyspnea  Used AVAPS overnight , now on 4 L plans to swithc back to AVAPS  Minimal cough  No fevers    1/13- seen on Bipap. H and H low. Has a NG tube but tube feeds have not started. On AVAPS overnight.     1/14-patient did not use AVAPS last night.  On 6 L.  More short of breath.  No tachypnea.  Mentation slightly worse.    1/15-was on AVAPS all day and night.  6 L of oxygen this morning after I saw her.    1/16-  on AVAPS since 10 am. Awake and alert this am. ABG pending.     1/17-central venous catheter placed in the right IJ.  Seen on nasal cannula oxygen.  Used AVAPS last night.  Modified barium swallow did not show any signs of aspiration but she had trouble with swallowing.    1/18 - Pt reports occasional nausea, no abdominal pain, no vomiting.     1/19 - Pt seen this morning, says she doesn't feel good. Denies any specific complaints. Denies pain. Says she just doesn't feel well.     1/20- had drop in H and H and work up showed large retro peritoneal hematoma. She was intubated. Head CT showed stroke. Surgery has seen patient. No surgical plans. H and H stable. Heparin stopped and reversed.    Objective:   BP (!) 108/40   Pulse (!) 102   Temp 99.5 °F (37.5 °C) (Bladder)   Resp 23   Ht 1.626 m (5' 4.02\")   Wt 45.7 kg (100 lb 12.8 oz)   SpO2 95%   BMI 17.29 kg/m²           Intake/Output Summary (Last 24 hours) at 1/20/2025 1436  Last data filed at 1/20/2025 1210  Gross per 24 hour   Intake 1178.61 ml   Output 24 ml   Net 1154.61 ml           Physical Exam:    General:  thin elderly female ill appearing intubated. Eyes open and 
IM Progress Note    Admit Date:  1/3/2025    Choledocholithiasis s.p ERCP with stone removal     1/9  Developed hypoxia, tachycardia needing ICU transfer   Possible aspiration and also has significant edema  Given lasix IV with improving imaging findings      1/12  Subjective:  Ms. Smith seen fatiged , awake in ICU bed  Did not do well overnight with progressive weakness and dyspnea  Used AVAPS overnight , now on 4 L plans to swithc back to AVAPS  Minimal cough  No fevers    1/13- seen on Bipap. H and H low. Has a NG tube but tube feeds have not started. On AVAPS overnight.     1/14-patient did not use AVAPS last night.  On 6 L.  More short of breath.  No tachypnea.  Mentation slightly worse.    1/15-was on AVAPS all day and night.  6 L of oxygen this morning after I saw her.    1/16-  on AVAPS since 10 am. Awake and alert this am. ABG pending.     1/17-central venous catheter placed in the right IJ.  Seen on nasal cannula oxygen.  Used AVAPS last night.  Modified barium swallow did not show any signs of aspiration but she had trouble with swallowing.    1/18 - Pt reports occasional nausea, no abdominal pain, no vomiting.     1/19 - Pt seen this morning, says she doesn't feel good. Denies any specific complaints. Denies pain. Says she just doesn't feel well.     Objective:   /60   Pulse 93   Temp 96.8 °F (36 °C) (Temporal)   Resp 27   Ht 1.626 m (5' 4.02\")   Wt 46.6 kg (102 lb 11.2 oz)   SpO2 (!) 75%   BMI 17.62 kg/m²           Intake/Output Summary (Last 24 hours) at 1/19/2025 0646  Last data filed at 1/19/2025 0223  Gross per 24 hour   Intake 1260.93 ml   Output --   Net 1260.93 ml           Physical Exam:    General:  thin elderly female ill appearing on oxygen.   Mucous Membranes:  Pink , anicteric  Neck: No JVD, no carotid bruit, no thyromegaly  Chest: + Accessory muscle use.  Bilateral wheezes.  Few crackles  Cardiovascular:  RRR S1S2 heard, no murmurs or gallops  Abdomen:  Soft, undistended, 
IM Progress Note    Admit Date:  1/3/2025    Choledocholithiasis s.p ERCP with stone removal     1/9  Developed hypoxia, tachycardia needing ICU transfer   Possible aspiration and also has significant edema  Given lasix IV with improving imaging findings      1/12  Subjective:  Ms. Smith seen fatiged , awake in ICU bed  Did not do well overnight with progressive weakness and dyspnea  Used AVAPS overnight , now on 4 L plans to swithc back to AVAPS  Minimal cough  No fevers    1/13- seen on Bipap. H and H low. Has a NG tube but tube feeds have not started. On AVAPS overnight.     1/14-patient did not use AVAPS last night.  On 6 L.  More short of breath.  No tachypnea.  Mentation slightly worse.    1/15-was on AVAPS all day and night.  6 L of oxygen this morning after I saw her.    1/16-  on AVAPS since 10 am. Awake and alert this am. ABG pending.     1/17-central venous catheter placed in the right IJ.  Seen on nasal cannula oxygen.  Used AVAPS last night.  Modified barium swallow did not show any signs of aspiration but she had trouble with swallowing.    1/18 - Pt reports occasional nausea, no abdominal pain, no vomiting.     Objective:   BP (!) 90/44   Pulse 77   Temp 98.1 °F (36.7 °C) (Axillary)   Resp 19   Ht 1.626 m (5' 4.02\")   Wt 46.6 kg (102 lb 11.2 oz)   SpO2 100%   BMI 17.62 kg/m²           Intake/Output Summary (Last 24 hours) at 1/18/2025 0605  Last data filed at 1/18/2025 0600  Gross per 24 hour   Intake 2116.13 ml   Output --   Net 2116.13 ml           Physical Exam:    General:  thin elderly female ill appearing on oxygen.   Mucous Membranes:  Pink , anicteric  Neck: No JVD, no carotid bruit, no thyromegaly  Chest: + Accessory muscle use.  Bilateral wheezes.  Few crackles  Cardiovascular:  RRR S1S2 heard, no murmurs or gallops  Abdomen:  Soft, undistended, non tender, no organomegaly, BS present  Extremities: scattered skin tears and ecchymoses  Edema to both LE 1+ No edema or cyanosis. 
IM Progress Note    Admit Date:  1/3/2025    Choledocholithiasis s.p ERCP with stone removal     Subjective:      1/21- awake and alert. SBT today. Possible PEG in am. Discussed with GI     1/22-plans for PEG tube today.  Discussed with the daughter about this again who says she wants to continue full code and wants PEG tube placed.  MRI showed stroke.  Discussed with neurology yesterday.    1/23- PEG placed, awake and alert on the ventilator. On SBT    1/24-[place] 2325 and placed on AVAPS.  She has been on AVAPS since then.  Tolerating tube feeds.    1/25  Continues to be on BiPAP    Objective:   BP (!) 141/85   Pulse (!) 115   Temp 98.5 °F (36.9 °C) (Bladder)   Resp 25   Ht 1.626 m (5' 4.02\")   Wt 45.4 kg (100 lb)   SpO2 99%   BMI 17.16 kg/m²           Intake/Output Summary (Last 24 hours) at 1/25/2025 1146  Last data filed at 1/25/2025 0902  Gross per 24 hour   Intake 2628.21 ml   Output 642 ml   Net 1986.21 ml           Physical Exam:    General: Elderly white female chronically ill-appearing seen on AVAPS.   Mucous Membranes:  Pink , anicteric  Neck: No JVD, no carotid bruit, no thyromegaly  Chest: + Accessory muscle use.  Bilateral wheezes.  Few crackles  Cardiovascular:  RRR S1S2 heard, no murmurs or gallops  Abdomen:  Soft, undistended, non tender, no organomegaly, BS present. PEG placed.  Extremities: scattered skin tears and ecchymoses  Edema to both LE 1+ No edema or cyanosis. Distal pulses well felt  Neurological :moves all 4 extremities      Medications:  lidocaine, ,   predniSONE, 30 mg, Daily  sodium chloride, 500 mL, Once  cefepime, 1,000 mg, Q12H  sodium phosphate IVPB (CENTRAL line), 30 mmol, Once  metroNIDAZOLE, 500 mg, Q8H  lidocaine, 1 patch, Daily  pantoprazole (PROTONIX) 40 mg in sodium chloride (PF) 0.9 % 10 mL injection, 40 mg, BID  busPIRone, 7.5 mg, BID  miconazole nitrate, , BID  ipratropium 0.5 mg-albuterol 2.5 mg, 1 Dose, 4x Daily RT  sodium chloride flush, 5-40 mL, 2 times 
Inpatient Occupational Therapy Evaluation & Treatment    Unit: [facility]  Date:  1/4/2025  Patient Name:    Terri Smith  Admitting diagnosis:  Choledocholithiasis with acute cholecystitis [K80.42]  Cholecystitis [K81.9]  Dysphagia, unspecified type [R13.10]  Admit Date:  1/3/2025  Precautions/Restrictions/WB Status/ Lines/ Wounds/ Oxygen:  Fall risk, Bed/chair alarm, Lines (IV, Supplemental O2 (4.5L on bipapL), and external catheter), and Isolation Precautions: Contact     Pt seen for cotreatment this date due to patient safety, patient endurance, complexity of condition, and acute illness/injury    Treatment Time:  15:48-16:40  Treatment Number:  1  Timed Code Treatment Minutes: 42 minutes  Total Treatment Minutes: 52 minutes    Patient Goals for Therapy: \"to get better\"          Discharge Recommendations: SNF  DME needs for discharge: Defer to facility       Therapy recommendations for staff:   Assist of 2 for stand-pivot transfers with gait belt and bilateral HHA to/from BSC  to/from chair    History of Present Illness: Per ED provider note on 1/3/24:  \"Terri Smith is a 84 y.o. female who presents to the emergency department today with symptoms of tailbone pain and nausea.  Patient reports symptoms of tenderness involving the tailbone since about a month ago when she had fallen.  States that she had been in a hospital setting few times since that event and related and telling by about her pain and she did not really have any concerns for injury.  States that she has noted some discomfort.  Today she called EMS to be evaluated as her tailbone pain continued to worsen.  States that she really has pain located in her left flank and into the left lower quadrant of her abdomen.  She also reports symptoms of nausea.  States she has had an episode of vomiting.  No upper back pain or neck pain.  No headache.  No recent fall.     Nursing Notes were all reviewed and agreed with or any disagreements were 
Inpatient Occupational Therapy Re-Evaluation & Treatment    Unit: ICU  Date:  1/12/2025  Patient Name:    Terri Smith  Admitting diagnosis:  Choledocholithiasis with acute cholecystitis [K80.42]  Cholecystitis [K81.9]  Dysphagia, unspecified type [R13.10]  Admit Date:  1/3/2025  Precautions/Restrictions/WB Status/ Lines/ Wounds/ Oxygen: Fall risk, Bed/chair alarm, Lines (IV, Supplemental O2 (4L), external catheter, and NG tube), Telemetry, Continuous pulse oximetry, and Isolation Precautions: Contact    Pt seen for cotreatment this date due to patient safety, patient endurance, complexity of condition, and acute illness/injury    Treatment Time:  14:20-14:55  Treatment Number:  1  Timed Code Treatment Minutes: 25 minutes  Total Treatment Minutes: 35 minutes    Patient Goals for Therapy: none stated        Discharge Recommendations: SNF  DME needs for discharge: Defer to facility       Therapy recommendations for staff:   Assist of 2 for sitting EOB    History of Present Illness: Per ED provider note on 1/3/24:  \"Terri Smith is a 84 y.o. female who presents to the emergency department today with symptoms of tailbone pain and nausea.  Patient reports symptoms of tenderness involving the tailbone since about a month ago when she had fallen.  States that she had been in a hospital setting few times since that event and related and telling by about her pain and she did not really have any concerns for injury.  States that she has noted some discomfort.  Today she called EMS to be evaluated as her tailbone pain continued to worsen.  States that she really has pain located in her left flank and into the left lower quadrant of her abdomen.  She also reports symptoms of nausea.  States she has had an episode of vomiting.  No upper back pain or neck pain.  No headache.  No recent fall.     Nursing Notes were all reviewed and agreed with or any disagreements were addressed in the HPI.\"    Per IM Progress Note 
Inpatient Occupational Therapy Treatment    Unit: [facility] [place]  Date:  1/8/2025  Patient Name:    Terri Smith  Admitting diagnosis:  Choledocholithiasis with acute cholecystitis [K80.42]  Cholecystitis [K81.9]  Dysphagia, unspecified type [R13.10]  Admit Date:  1/3/2025  Precautions/Restrictions/WB Status/ Lines/ Wounds/ Oxygen:  Fall risk, Bed/chair alarm, [place], Lines (IV, Supplemental O2 (3L)), and Isolation Precautions: Contact       Treatment Time:  8:45-9:15  Treatment Number:  2  Timed Code Treatment Minutes: 30 minutes  Total Treatment Minutes: 30 minutes    Patient Goals for Therapy: \"to get better\"          Discharge Recommendations: SNF  DME needs for discharge: Defer to facility       Therapy recommendations for staff:   Assist of 1 for stand-pivot transfers with gait belt to/from chair    History of Present Illness: Per ED provider note on 1/3/24:  \"Terri Smith is a 84 y.o. female who presents to the emergency department today with symptoms of tailbone pain and nausea.  Patient reports symptoms of tenderness involving the tailbone since about a month ago when she had fallen.  States that she had been in a hospital setting few times since that event and related and telling by about her pain and she did not really have any concerns for injury.  States that she has noted some discomfort.  Today she called EMS to be evaluated as her tailbone pain continued to worsen.  States that she really has pain located in her left flank and into the left lower quadrant of her abdomen.  She also reports symptoms of nausea.  States she has had an episode of vomiting.  No upper back pain or neck pain.  No headache.  No recent fall.     Nursing Notes were all reviewed and agreed with or any disagreements were addressed in the HPI.\"     Preadmission Environment:  Pt. Lives                                              with family (son and his girlfriend)  Home environment:                            one story 
Inpatient Occupational Therapy Treatment    Unit: ICU  Date:  1/16/2025  Patient Name:    Terri Smith  Admitting diagnosis:  Choledocholithiasis with acute cholecystitis [K80.42]  Cholecystitis [K81.9]  Dysphagia, unspecified type [R13.10]  Admit Date:  1/3/2025  Precautions/Restrictions/WB Status/ Lines/ Wounds/ Oxygen: Fall risk, Bed/chair alarm, Lines (IV, Supplemental O2 (3L), CVC Right, external catheter, and NG tube), Telemetry, Continuous pulse oximetry, and Isolation Precautions: Contact     Pt seen for cotreatment this date due to patient safety, patient endurance, and need for the assistance of 2 skilled therapists    Treatment Time:  3219-3681  Treatment Number:  3  Timed Code Treatment Minutes: 25 minutes  Total Treatment Minutes:  25  minutes    Patient Goals for Therapy: \"none stated\"          Discharge Recommendations: SNF  DME needs for discharge: Defer to facility       Therapy recommendations for staff:   Assist of 1 for sitting EOB    History of Present Illness: Per ED provider note on 1/3/24:  \"Terri Smith is a 84 y.o. female who presents to the emergency department today with symptoms of tailbone pain and nausea.  Patient reports symptoms of tenderness involving the tailbone since about a month ago when she had fallen.  States that she had been in a hospital setting few times since that event and related and telling by about her pain and she did not really have any concerns for injury.  States that she has noted some discomfort.  Today she called EMS to be evaluated as her tailbone pain continued to worsen.  States that she really has pain located in her left flank and into the left lower quadrant of her abdomen.  She also reports symptoms of nausea.  States she has had an episode of vomiting.  No upper back pain or neck pain.  No headache.  No recent fall.     Nursing Notes were all reviewed and agreed with or any disagreements were addressed in the HPI.\"     Per IM Progress Note 
Inpatient Occupational Therapy Treatment    Unit: ICU  Date:  1/16/2025  Patient Name:    Terri Smith  Admitting diagnosis:  Choledocholithiasis with acute cholecystitis [K80.42]  Cholecystitis [K81.9]  Dysphagia, unspecified type [R13.10]  Admit Date:  1/3/2025  Precautions/Restrictions/WB Status/ Lines/ Wounds/ Oxygen: Fall risk, Bed/chair alarm, Lines (IV, Supplemental O2 (4L), external catheter, and NG tube), Telemetry, Continuous pulse oximetry, and Isolation Precautions: Contact     Pt seen for cotreatment this date due to patient safety, patient endurance, and need for the assistance of 2 skilled therapists    Treatment Time:  9962-1296  Treatment Number:  2  Timed Code Treatment Minutes: 23 minutes  Total Treatment Minutes:  23  minutes    Patient Goals for Therapy: none stated          Discharge Recommendations: SNF  DME needs for discharge: Defer to facility       Therapy recommendations for staff:   Assist of 2 for sitting EOB    History of Present Illness: Per ED provider note on 1/3/24:  \"Terri Smith is a 84 y.o. female who presents to the emergency department today with symptoms of tailbone pain and nausea.  Patient reports symptoms of tenderness involving the tailbone since about a month ago when she had fallen.  States that she had been in a hospital setting few times since that event and related and telling by about her pain and she did not really have any concerns for injury.  States that she has noted some discomfort.  Today she called EMS to be evaluated as her tailbone pain continued to worsen.  States that she really has pain located in her left flank and into the left lower quadrant of her abdomen.  She also reports symptoms of nausea.  States she has had an episode of vomiting.  No upper back pain or neck pain.  No headache.  No recent fall.     Nursing Notes were all reviewed and agreed with or any disagreements were addressed in the HPI.\"     Per IM Progress Note from  
Inpatient Physical Therapy Re-Evaluation and Treatment    Unit: ICU  Date:  1/12/2025  Patient Name:    Terri Smith  Admitting diagnosis:  Choledocholithiasis with acute cholecystitis [K80.42]  Cholecystitis [K81.9]  Dysphagia, unspecified type [R13.10]  Admit Date:  1/3/2025  Precautions/Restrictions/WB Status/ Lines/ Wounds/ Oxygen: Fall risk, Bed/chair alarm, Lines (IV, Supplemental O2 (4L), external catheter, and NG tube), Telemetry, Continuous pulse oximetry, and Isolation Precautions: Contact      Pt seen for cotreatment this date due to patient safety, patient endurance, complexity of condition, and acute illness/injury    Treatment Time:  14:20-14:55  Treatment Number:  1  Timed Code Treatment Minutes: 25 minutes  Total Treatment Minutes: 35 minutes     Patient Goals for Therapy: none stated          Discharge Recommendations: SNF  DME needs for discharge: Defer to facility         Therapy recommendation for EMS Transport: requires transport by cot due to pt needs lift equipment for safe transfers, pt needs A x 2 for safe transfers, and pt with poor sitting balance/tolerance    Therapy recommendations for staff:   Assist of 2 for sitting EOB    History of Present Illness:   Per ED provider note on 1/3/24:  \"Terri Smith is a 84 y.o. female who presents to the emergency department today with symptoms of tailbone pain and nausea.  Patient reports symptoms of tenderness involving the tailbone since about a month ago when she had fallen.  States that she had been in a hospital setting few times since that event and related and telling by about her pain and she did not really have any concerns for injury.  States that she has noted some discomfort.  Today she called EMS to be evaluated as her tailbone pain continued to worsen.  States that she really has pain located in her left flank and into the left lower quadrant of her abdomen.  She also reports symptoms of nausea.  States she has had an episode 
Inpatient Physical Therapy Treatment     Unit: ICU  Date:  1/13/2025  Patient Name:    Terri Smith  Admitting diagnosis:  Choledocholithiasis with acute cholecystitis [K80.42]  Cholecystitis [K81.9]  Dysphagia, unspecified type [R13.10]  Admit Date:  1/3/2025  Precautions/Restrictions/WB Status/ Lines/ Wounds/ Oxygen: Fall risk, Bed/chair alarm, Lines (IV, Supplemental O2 (4L), external catheter, and NG tube), Telemetry, Continuous pulse oximetry, and Isolation Precautions: Contact      Pt seen for cotreatment this date due to patient safety, patient endurance, complexity of condition, and acute illness/injury    Treatment Time:  3429-8668  Treatment Number:  2  Timed Code Treatment Minutes: 25 minutes  Total Treatment Minutes: 25 minutes     Patient Goals for Therapy: none stated          Discharge Recommendations: SNF  DME needs for discharge: Defer to facility         Therapy recommendation for EMS Transport: requires transport by cot due to pt needs lift equipment for safe transfers, pt needs A x 2 for safe transfers, and pt with poor sitting balance/tolerance    Therapy recommendations for staff:   Assist of 2 for sitting EOB    History of Present Illness:   Per ED provider note on 1/3/24:  \"Terri Smith is a 84 y.o. female who presents to the emergency department today with symptoms of tailbone pain and nausea.  Patient reports symptoms of tenderness involving the tailbone since about a month ago when she had fallen.  States that she had been in a hospital setting few times since that event and related and telling by about her pain and she did not really have any concerns for injury.  States that she has noted some discomfort.  Today she called EMS to be evaluated as her tailbone pain continued to worsen.  States that she really has pain located in her left flank and into the left lower quadrant of her abdomen.  She also reports symptoms of nausea.  States she has had an episode of vomiting.  No 
Inpatient Physical Therapy Treatment    Unit: 2 [place]  Date:  1/9/2025  Patient Name:    Terri Smith  Admitting diagnosis:  Choledocholithiasis with acute cholecystitis [K80.42]  Cholecystitis [K81.9]  Dysphagia, unspecified type [R13.10]  Admit Date:  1/3/2025  Precautions/Restrictions/WB Status/ Lines/ Wounds/ Oxygen: Fall risk, Bed/chair alarm, Lines (IV and Supplemental O2 (3L)), and Isolation Precautions: Contact      Pt seen for cotreatment this date due to patient safety, patient endurance, complexity of condition, and acute illness/injury    Treatment Time:  1620 - 1713  Treatment Number:  1   Timed Code Treatment Minutes: 53 minutes  Total Treatment Minutes:  53  minutes    Patient Stated Goals for Therapy: \" to get better \"          Discharge Recommendations: SNF  DME needs for discharge: Defer to facility       Therapy recommendation for EMS Transport: requires transport by cot due to pt with poor sitting balance/tolerance    Therapy recommendations for staff:   Assist of 2 for stand-pivot transfers with gait belt to/from BSC  to/from chair    History of Present Illness:   Per ED provider note on 1/3/24:  \"Terri Smith is a 84 y.o. female who presents to the emergency department today with symptoms of tailbone pain and nausea.  Patient reports symptoms of tenderness involving the tailbone since about a month ago when she had fallen.  States that she had been in a hospital setting few times since that event and related and telling by about her pain and she did not really have any concerns for injury.  States that she has noted some discomfort.  Today she called EMS to be evaluated as her tailbone pain continued to worsen.  States that she really has pain located in her left flank and into the left lower quadrant of her abdomen.  She also reports symptoms of nausea.  States she has had an episode of vomiting.  No upper back pain or neck pain.  No headache.  No recent fall.     Nursing Notes were 
Inpatient Physical Therapy Treatment    Unit: ICU  Date:  1/16/2025  Patient Name:    Terri Smith  Admitting diagnosis:  Choledocholithiasis with acute cholecystitis [K80.42]  Cholecystitis [K81.9]  Dysphagia, unspecified type [R13.10]  Admit Date:  1/3/2025  Precautions/Restrictions/WB Status/ Lines/ Wounds/ Oxygen: Fall risk, Bed/chair alarm, Lines (IV, Supplemental O2 (4L), CVC Right, external catheter, and NG tube), Telemetry, Continuous pulse oximetry, and Isolation Precautions: Contact       Pt seen for cotreatment this date due to patient safety, patient endurance, and need for the assistance of 2 skilled therapists    Treatment Time:  6445-1457  Treatment Number:  3   Timed Code Treatment Minutes: 25 minutes  Total Treatment Minutes:  25  minutes    Patient Stated Goals for Therapy: none stated          Discharge Recommendations: SNF  DME needs for discharge: Defer to facility       Therapy recommendation for EMS Transport: requires transport by cot due to pt needs A x 2 for safe transfers    Therapy recommendations for staff:   Assist of 2 for sitting EOB    History of Present Illness:   Per ED provider note on 1/3/24:  \"Terri Smith is a 84 y.o. female who presents to the emergency department today with symptoms of tailbone pain and nausea.  Patient reports symptoms of tenderness involving the tailbone since about a month ago when she had fallen.  States that she had been in a hospital setting few times since that event and related and telling by about her pain and she did not really have any concerns for injury.  States that she has noted some discomfort.  Today she called EMS to be evaluated as her tailbone pain continued to worsen.  States that she really has pain located in her left flank and into the left lower quadrant of her abdomen.  She also reports symptoms of nausea.  States she has had an episode of vomiting.  No upper back pain or neck pain.  No headache.  No recent fall.   
K 3.1 called to Dr Vickers   orders obtained will replace  
[facility]   Pharmacy Pharmacokinetic Monitoring Service - Vancomycin     Terri Smith is a 84 y.o. female starting on vancomycin therapy for PNA. Pharmacy consulted by Dr. Wilkinson for monitoring and adjustment.    Target Concentration: Goal AUC/RICHARDSON 400-600 mg*hr/L    Additional Antimicrobials: cefepime    Pertinent Laboratory Values:   Wt Readings from Last 1 Encounters:   01/19/25 45.7 kg (100 lb 12.8 oz)     Temp Readings from Last 1 Encounters:   01/19/25 (!) 96.5 °F (35.8 °C)     Estimated Creatinine Clearance: 24 mL/min (A) (based on SCr of 1.3 mg/dL (H)).  Recent Labs     01/18/25  0540 01/19/25  0640 01/19/25  0945   CREATININE 1.1 1.3*  --    BUN 22* 33*  --    WBC 9.4  --  21.0*         Plan:  Concentration-guided dosing due to renal impairment/insufficiency  Start vancomycin 1000mg, pulse dose.  Renal labs as indicated   Vancomycin concentration ordered for 1/20 @ 0600   Pharmacy will continue to monitor patient and adjust therapy as indicated    Thank you for the consult,  Kim Fenton RPH  1/19/2025 12:49 PM   
[facility] Vascular Access  Ultrasound Guided Peripheral Insertion Procedure Note.     Terri Smith   Admitted- 1/3/2025  7:08 PM  Admission diagnosis- Choledocholithiasis with acute cholecystitis [K80.42]  Cholecystitis [K81.9]  Dysphagia, unspecified type [R13.10]      Attending Physician- Moon Sena MD  Ordering Physician- Dr. Renny Vogel  Indication for Insertion: Limited Access    USG PIV placed to right basilic vessel using sterile technique. Brisk blood return noted, line flushes with ease.     Initial consult was for midline, pt had one small vessel in R. Arm that measured large enough for single lumen midline. However after multiple attempts, writer was unable to access.     Pt confused and combative and vessel was small. Claritza BECKETT assisted with holding pt, but was still unable to place midline. No other suitable vessels found in bilateral arms for PICC or midline at this time so PIV placed instead. RN aware    Bed returned to lowest position, call light within reach. See US images below.              Miriam Mixon RN  Vascular Access Team   
[facility] Vascular Access  Ultrasound Guided Peripheral Insertion Procedure Note.     Terri Smith   Admitted- 1/3/2025  7:08 PM  Admission diagnosis- Choledocholithiasis with acute cholecystitis [K80.42]  Cholecystitis [K81.9]  Dysphagia, unspecified type [R13.10]      Attending Physician- Moon Sena MD  Ordering Physician- Dr. Popeye Madison  Indication for Insertion: No Access          USG PIV placed to left upper arm using sterile technique. Brisk blood return noted, line flushes with ease. Patient tolerated well. Bed returned to lowest position, call light within reach. See US images below.        Karine Lauren RN    
Leo BECKETT updated on pt's care.   
Let Dr Madison know Dr Lester is keeping her another day.  
Levophed is off BP maintaining.  Tube feed started.  
Loose BM noted in attends.  Amanda care given, pt cleaned.  Reddened area noted coccyx and buttocks.  
Low dose Heparin GTT:  Anti-Xa 0.47 at 2130 is therapeutic.  No changes needed.  Continue infusion at 18units/kg/hr recheck labs at 0400.  Ori Whitfield RPH PharmD 1/17/2025 9:47 PM  
Low dose Heparin GTT:  Anti-Xa at 1600 is 0.18IU/mL.  Give a 1,400 unit IV bolus dose and increase the infusion to 18units/kg/hr (840units/hr) and recheck the anti-Xa at 2200 this evening.  Desired range is 0.3-0.7IU/mL.  Ori Whitfield RPH PharmD 1/17/2025 4:03 PM  
MD made aware of ABG results. Per MD Pt can come off BiPAP if she requests. Family updated. Consent obtained for central line.  
[place] complete with Dr. Huber. PRN ativan orders to be d/c'd, if patient shows agitation precedex to be used for comfort on bipap. New medication orders entered by Dr. Huber. Patient to have CXR today.   
Med rec completed off of Order Summary Report supplied by [facility].  
Message send to surgery in regards to starting aspirin given stroke seen on MRI , but with recent large retro peritoneal bleed.     Surgery PA to ask DR. Robertson in AM, as surgery had signed off and PA and DR. Toure are unfamiliar with case.   
Midline placed mag replacement started.  
NA+ level came back at 140. Notified Dr Vickers and orders placed to hold  D5 1/2 NS, check BMP in AM and consult Nephrology in the AM  
NEPHROLOGY CONSULT, SPOKE TO JERAD Electronically signed by Yodit Gonzalez on 1/11/2025 at 6:22 AM  
NG placed in rt nare w/out difficulty. Post placement X-ray ordered.  
Neurology Follow-up  [facility] Neurology  Mino Sumner MD    Date of Service: 01/21/25        Terri Smith is a 84 y.o. female who  has a past medical history of NADJA (acute kidney injury) ([facility]), Asthma, Atrial fibrillation with RVR (HCC), CAD (coronary artery disease), CHF (congestive heart failure) (HCC), Chronic anxiety, COPD (chronic obstructive pulmonary disease) (HCC), COPD (chronic obstructive pulmonary disease) (HCC), GERD (gastroesophageal reflux disease), Heart attack (HCC), History of blood transfusion, Hyperlipidemia, Hypertension, MRSA (methicillin resistant staph aureus) culture positive, OA (osteoarthritis), and Thyroid disease.    Subjective:   CC: Follow up today regarding: stroke    Events noted. Chart and lab reviewed.     Her daughter reports she was having a headache prior to admission but she also has history of frequent migraine headaches. No arterial/vascular procedure recently.     ROS : A 10-12 system review obtained and updated today and is unremarkable except as mentioned  in my interval history.     Medication, past medical history, social history, and family history reviewed.    Physical Examination - propofol increased in preparation for PEG tube placement today  Mental Status:    comatose. Does not follow commands or regard examiner.  GCS = 2T  Best eye response: best eye response: no eye opening (1)  Best verbal response: best verbal response: not testable/intubated (NT)  Best motor response: best motor response: no motor response (1)    Cranial Nerves:  Blink to threat absent bilateral. PERRL.   Oculocephalic reflex present, eyes cross midline, no gaze preference.    Corneal reflex present bilateral symmetric      Motor/Sensory:   Normal muscle bulk and reduced tone.   Strength not testable due to mental status.  Motor response to stimuli  RUE: no response to noxious stimuli  LUE: no response to noxious stimuli  RLE: no response to noxious stimuli  LLE: no response to 
Neurology Follow-up  [facility] Neurology  Mino Sumner MD    Date of Service: 01/22/25        Terri Smith is a 84 y.o. female who  has a past medical history of NADJA (acute kidney injury) ([facility]), Asthma, Atrial fibrillation with RVR (HCC), CAD (coronary artery disease), CHF (congestive heart failure) (HCC), Chronic anxiety, COPD (chronic obstructive pulmonary disease) (HCC), COPD (chronic obstructive pulmonary disease) (HCC), GERD (gastroesophageal reflux disease), Heart attack (HCC), History of blood transfusion, Hyperlipidemia, Hypertension, MRSA (methicillin resistant staph aureus) culture positive, OA (osteoarthritis), and Thyroid disease.    Subjective:   CC: Follow up today regarding: stroke    Events noted. Chart and lab reviewed.     Her daughter reports she was having a headache prior to admission but she also has history of frequent migraine headaches. No arterial/vascular procedure recently.     ROS : A 10-12 system review obtained and updated today and is unremarkable except as mentioned  in my interval history.     Medication, past medical history, social history, and family history reviewed.    Physical Examination - propofol gtt  Mental Status:    comatose. Follows commands with right hand only.  GCS = 9T  Best eye response: best eye response: to verbal command (3)  Best verbal response: best verbal response: not testable/intubated (NT)  Best motor response: best motor response: obeys commands (6)    Cranial Nerves:  Blink to threat absent bilateral. PERRL.   Oculocephalic reflex present, eyes cross midline, no gaze preference.    Corneal reflex present bilateral symmetric      Motor/Sensory:   Normal muscle bulk and reduced tone.   Strength not testable due to mental status.  Motor response to stimuli  RUE: purposeful to noxious stimuli and command  LUE: no response to noxious stimuli  RLE: non-purposeful to noxious stimuli  LLE: non-purposeful to noxious stimuli    Coordination: No 
New ABG results sent to Faustino Everett MD via perfect serve.  
New skin tear due to tourniquet for lab draw.     
Now plans for comfort measures  No further recommendations at this time  Will sign off for now  Call with questions    Case d/w ICU team    
Nutrition Note    RECOMMENDATIONS  PO Diet: Continue the diet per GI and Surgery  ONS: Ordered Ensure clear TID     ASSESSMENT   Pt triggered a nutrition assessment for stage 2 pressure injury. Pt is admitted with choledocholithiasis and vomiting, with noted mild abdominal pain. General surgery and GI are following, and an ERCP is planned on 1/7. Pt was on a regular low-fat, low-fiber diet and is NPO at midnight for the procedure. PO intake per record is 0%. Increased protein is recommended for wound healing. Will order Ensure Clear for additional nutrition, as it contains no fat. Will cont. to follow up.       Malnutrition Status: Insufficient data  Acute Illness  Findings of the 6 clinical characteristics of malnutrition:  Energy Intake:  Unable to assess  Weight Loss:  Mild weight loss     Body Fat Loss:  Unable to assess     Muscle Mass Loss:  Unable to assess    Fluid Accumulation:  No fluid accumulation     Strength:  Not Performed      NUTRITION DIAGNOSIS   Inadequate protein-energy intake related to altered GI function as evidenced by intake 0-25%, vomiting  Increased nutrient needs related to increase demand for energy/nutrients as evidenced by wounds    Goals: PO intake 50% or greater, by next RD assessment     NUTRITION RELATED FINDINGS  Objective: LBM 1/5. Nno edema noted. Na+142, K+ 4.1, POCG 139.  Wounds: Stage II, Pressure Injury    CURRENT NUTRITION THERAPIES  ADULT DIET; Regular; Low Fat (less than or equal to 50 gm/day); Low Fiber  Diet NPO       PO Intake: 0%   PO Supplement Intake:None Ordered  ANTHROPOMETRICS  Current Height: 162.6 cm (5' 4.02\")  Current Weight - Scale: 47.2 kg (104 lb)    Ideal Body Weight (IBW): 120 lbs  (55 kg)      BMI: 17.9      COMPARATIVE STANDARDS  Total Energy Requirements (kcals/day): 9820-2917     Protein (g):  57-66       Fluid (mL/day):  7328-0412    EDUCATION  Education/Counseling not indicated     The patient will be monitored per nutrition standards of care. 
OLEGARIO spoke with family, stated the plan is to withdraw care. Dr. Hernandez notified and orders given for comfort care medications.   
Occupational/Physical Therapy  Attempted to see patient this date. Patient held per RN due to status, sedated on vent. Please reorder therapy when appropriate-will NEED NEW ORDERS to resume therapy services.    Padma Davies, OTR/L [id number]  Constantine Cassidy, PT, DPT         
Order for rapid sequence intubation obtained.      Patient pre-oxygenated to with BiPAP to a saturation 98 %.     45 mg of ketamine ordered and administered at 1:08 PM     27 mg of rocuronium ordered and administered at 1:09 PM       Recent Labs     01/17/25  2127 01/18/25  0540 01/19/25  0640   * 148* 141   K 3.5 3.6 4.3   BUN 22* 22* 33*   CREATININE 1.0 1.1 1.3*         Dr. Wilkinson inserted a number  7.5 ET tube. The ET tube is secured at 22 cm measured at the patients lip line. Breath sounds are equal bilaterally. There is a positive color change noted in the colorimetric CO2 detector. RT connected the patient to a ventilator. See orders for ventilator orders.       POST Intubation ORDERS    ABG ordered in 1 hours  Portable Chest x-ray ordered to confirm placement of the patients ET tube and gastric tube.   Bilateral wrist restraints ordered and initiated. Pulses present distal to restraints.   Propofol drip ordered for sedation. See EMAR and orders.     See physician note to follow. Electronically signed by Stacy Fall RN on 1/19/2025 at 1:08 PM     
PRBC completed, post transfusion h/h drawn. Pt remains on 6 LNC. No needs expressed. Will monitor.  
PRBC transfusion almost complete. Will order post transfusion h/h. No s/s of transfusion reaction noted during blood transfusion.   
PT/OT at bedside for therapy. Pt on 4 LNC o2 >90%  
Patient able to remain off bipap, this RN at bedside attempting to administer daily medications. Patient found to be resting with eyes closed, patient's daughter request this RN to come back and let patient rest as to not agitate her.  
Patient admitted to ICU bed 3011 from 2W after rapid response for increased oxygen demand. Patient oriented to room, call light, bed rails, phone, lights and bathroom. Patient instructed about the schedule of the day including: vital sign frequency, lab draws, possible tests, frequency of MD and staff rounds, daily weights, I &O's and prescribed diet. Bedside tele in place, patient aware of placement and reason. Bed locked, in lowest position, side rails up 2/4, call light within reach.    4 Eyes Skin Assessment     The patient is being assess for   Admission    I agree that 2 RN's have performed a thorough Head to Toe Skin Assessment on the patient. ALL assessment sites listed below have been assessed.      Areas assessed for pressure by both nurses:   [x]   Head, Face, and Ears   [x]   Shoulders, Back, and Chest, Abdomen  [x]   Arms, Elbows, and Hands   [x]   Coccyx, Sacrum, and Ischium  [x]   Legs, Feet, and Heels        Skin Assessed Under all Medical Devices by both nurses:  O2 device tubing              All Mepilex Borders were peeled back and area peeked at by both nurses:  Yes  Please list where Mepilex Borders are located: x4 extremities, sacrum             **SHARE this note so that the co-signing nurse is able to place an eSignature**    Co-signer eSignature: Electronically signed by Claritza Kennedy RN on 1/10/25 at 11:35 AM EST    Does the Patient have Skin Breakdown related to pressure?  Yes LDA WOUND CARE was Initiated documentation include the Amanda-wound, Wound Assessment, Measurements, Dressing Treatment, Drainage, and Color\",          Trey Prevention initiated:  Yes   Wound Care Orders initiated:  Yes      [facility] nurse consulted for Pressure Injury (Stage 3,4, Unstageable, DTI, NWPT, Complex wounds)and New or Established Ostomies:  Yes      Primary Nurse eSignature: Electronically signed by Lexy Martinez RN on 1/10/25 at 7:29 AM EST       
Patient alert and oriented x 4. Patient voiding via incontinent briefs. Patient NPO since midnight per order. Patient with no c/o nausea but does c/o abdominal pain, PRN tylenol given per order, see MAR. assessment completed, see flowsheet. IV antibiotics and fluids infusing per order. Patient's bed is locked and in lowest position, side rails up x 3 with an active bed alarm. Non slip socks applied.     
Patient back to unit in stretcher.   
Patient extubated to 3L HF NC.  
Patient given break from bipap placed on 4 lpm o2 per NC.   
Patient had an ERCP.  
Patient placed on 40% avaps for the hs   
Patient placed on avaps 40% for the hs  
Patient placed on avaps for the hs 40%  
Patient placed on bipap this morning because worsening breathing and TF held per Dr Wilkinson.  
Patient provided a COPD Educational Folder that includes the following materials:     [x]  Quellan Booklet: Managing your COPD  [x]  ALA: Getting the Most Out of Medication Delivery Devices  [x]  ALA: My COPD Action Plan  [x]  Better Breathers Club: [facility]ann[facility]   [x]  Smoking Cessation Classes  [x]  Outpatient Spiritual Care Services  [x]  Magnet: Signs of COPD    PATIENT/CAREGIVER TEACHING:   Level of patient/caregiver understanding able to:   [x] Verbalize understanding   [] Demonstrate understanding       [] Teach back        [] Needs reinforcement     []  Other:     
Patient with no UOP noted from bond this RN checked multiple times and not kinked, flushed at this time with urine return.  
Patient's son taking home portable oxygen machine   
Per Dr Wilkinson transfuse blood wide open   
Per Faustino Everett MD, have pt placed back on Bi-pap by RT. RT notified. Per MD, repeat ABG 30 mins after Bi-pap placed.  
Perfect serve sent to Dr. Dow for neuro consult Electronically signed by Traci Riley on 1/19/2025 at 4:43 PM    
Perfect serve sent to Dr. Gentile Electronically signed by Traci Riley on 1/19/2025 at 1:26 PM  
Pharmacy - Low dose Heparin  Anti-Xa level drawn at 1815 today = 0.19 IU/ml (Goal anti-Xa 0.3-0.7 IU/mL).  Current heparin drip rate = 12 units/kg/hr.  Per protocol, give heparin 1400 units IV bolus x 1 and increase heparin drip rate to 14 units/kg/hr.  Draw Anti-Xa level 6 hours after bolus dose given and drip rate increased.  Will adjust to goal anti-Xa 0.3-0.7 IU/mL. Pharmacy will continue to monitor and adjust as necessary.   
Pharmacy - RE:  Low-dose Heparin drip  Current rate = 14 units/kg/hr  Anti-Xa drawn @ 0115 = 0.25 IU/ml  Goal Anti-Xa = 0.3 - 0.7 IU/ml  Per protocol, bolus with 1400 units, increase rate to 16 units/kg/hr, and obtain another Anti-Xa 1/17 @ 0900.    Deepali Mehta PharmD, [facility], 1/17/2025 3:56 AM      
Pharmacy - RE:  Low-dose Heparin drip  Current rate = 16 units/kg/hr  Anti-Xa drawn @ 0913 = 0.34 IU/ml  Goal Anti-Xa = 0.3 - 0.7 IU/ml  Per protocol, continue at current rate.  Anti-XA at 1530 today    Kim Fenton [facility]          
Pharmacy - RE:  Low-dose Heparin drip  Current rate = 18 units/kg/hr  Anti-Xa drawn @ 0540 = 0.50 IU/ml  Goal Anti-Xa = 0.3 - 0.7 IU/ml  Per protocol, continue current rate and obtain another Anti-Xa 1/18 @ 1200.    Deepali Mehta PharmD, [facility], 1/18/2025 6:31 AM      
Pharmacy - RE:  Low-dose Heparin drip  Current rate = 18 units/kg/hr  Anti-Xa drawn @ 0640 = 0.49 IU/ml  Goal Anti-Xa = 0.3 - 0.7 IU/ml  Per protocol, continue current rate and obtain another Anti-Xa 1/20 @ 0600.    Deepali Mehta PharmD, [facility], 1/19/2025 7:21 AM      
Phlebotomist reported the he was unable to obtain scheduled BMP, informed  me he will send up another phlebotomist to attempt draw.   
Physical / Occupational Therapy    Pt transferred to ICU. Will need new orders for physical therapy.    Constantine Cassidy, PT, DPT   Jc Enriquez, OTR/L #[id number]     
Physical/Occupational Therapy    Pt currently on caseload. Rapid called today and patient was intubated. Will continue to follow. Re-attempt as appropriate and schedule permits.     Constantine Cassidy, PT, DPT   Jc Enriquez, OTR/L #[id number]   
Pre-Operative:  1.  Patient/Caregiver identifies - states name and date of birth.  2.  The patient is free from signs and symptoms of injury.  3.  The patient receives appropriate medication(s), safely administered during the Perioperative period.  4.  The patients's fluid, electrolyte, and acid-base balances are established preoperatively.  5.  The patient's pulmonary function is established preoperatively.  6.  The patient's cardiovascular status is established preoperatively.  7.  The patient / caregiver demonstrates knowledge of nutritional management related to the operative or other invasive procedure.  8.  The patient/caregiver demonstrates knowledge of medication management.  9.  The patient/caregiver demonstrates knowledge of pain management.  10.  The patient/caregiver participates in decisions affection his or her Perioperative plan of care.  11.  The patient's care is consistent with the individualized Perioperative plan of care.  12.  The patient's right to privacy is maintained.  13.  The patient is the recipient of competent and ethical care within legal standards of practice.  14.  The patient's value system, lifestyle, ethnicity, and culture are considered, respected, and incorporated in the Perioperative plan of care and understands special services available.  15.  The patient demonstrates and/or reports adequate pain control throughout the the Perioperative period.  16.  The patient's neurological status is established preoperatively.  17.  The patient/caregiver demonstrates knowledge of the expected responses to the endoscopy procedure.  18.  Patient/Caregiver has reduced anxiety.  Interventions- Familiarize with environment and equipment.  19. Patient/Caregiver verbalizes understanding of Phase II and/or Phase I process.  20.  Patient pain level is established preoperatively using age appropriate pain scale.  21.  The patient will move to fall risk upon sedation- during and through the recovery 
Pre-Operative:  1.  Patient/Caregiver identifies - states name and date of birth.  2.  The patient is free from signs and symptoms of injury.  3.  The patient receives appropriate medication(s), safely administered during the Perioperative period.  4.  The patients's fluid, electrolyte, and acid-base balances are established preoperatively.  5.  The patient's pulmonary function is established preoperatively.  6.  The patient's cardiovascular status is established preoperatively.  7.  The patient / caregiver demonstrates knowledge of nutritional management related to the operative or other invasive procedure.  8.  The patient/caregiver demonstrates knowledge of medication management.  9.  The patient/caregiver demonstrates knowledge of pain management.  10.  The patient/caregiver participates in decisions affection his or her Perioperative plan of care.  11.  The patient's care is consistent with the individualized Perioperative plan of care.  12.  The patient's right to privacy is maintained.  13.  The patient is the recipient of competent and ethical care within legal standards of practice.  14.  The patient's value system, lifestyle, ethnicity, and culture are considered, respected, and incorporated in the Perioperative plan of care and understands special services available.  15.  The patient demonstrates and/or reports adequate pain control throughout the the Perioperative period.  16.  The patient's neurological status is established preoperatively.  17.  The patient/caregiver demonstrates knowledge of the expected responses to the endoscopy procedure.  18.  Patient/Caregiver has reduced anxiety.  Interventions- Familiarize with environment and equipment.  19. Patient/Caregiver verbalizes understanding of Phase II and/or Phase I process.  20.  Patient pain level is established preoperatively using age appropriate pain scale.  21.  The patient will move to fall risk upon sedation- during and through the recovery 
Precedex off after pt failed to keep BiPap on and B/P in 80's. Pt slept well on 3 l no coughing or gurgling noted. O2 sat remained  %   
Precedex stopped per Dr. Wilkinson's orders r/t patient off bipap at this time.   
Prior to receiving transfusion of blood products, patient educated on the following:    Blood product transfusion process  Benefits of receiving blood product transfusion  Risks associated with receiving the transfusion  Signs and symptoms of complications associated with blood product transfusion  Vague, uneasy feeling  Onset of pain (especially at the IV site, back, or chest)  Chills  Flushing  Fever  Nausea  Dizziness  Rash  Itching  Dark or red urine  Instructed patient to notify RN immediately if any sign or symptom occurs       
Pt C/O nausea Medicated with Zofran 4 mg IVP. Tube feed remains on hold. Pt mentioned earlier tonight that she felt like her heart was hurting but pt was also very concerned about getting the PEG tube and had many questions.  
Pt admitted to 0231   from ER, pt is from [facility],    . AOX4   VSS. Oriented to room and call light. Bed in lowest position, bed alarm on, and wheels locked. Pt asking for breathing treatment, she does not have this ordered, covering hospitalist messaged and requested breathing treatments per pt request, noted to have on home medications.Pt reminded to call for any needs and before getting OOB, pt verbalized understanding and call light within reach.    
Pt asking to come off BiPap. Pt placed back on O2 3 liters and Precedex gtt started. Pt agreeable to try again in 1 hour.   
Pt assist back to bed.  
Pt awake, agitated, wanted BiPap off Mouth care done. Pt called her son and Yasmin.calling for help. Precedex increased to 0.5 mcg/kg/hour. RTD in for HHN treatment. Then pt to return to Bipap.   
Pt bathed. L leg weeping Mepilex dressing changed to back of Left sin and leg wrapped in kerlex with elastic sleeve material over it to hold it in place. Pt turned and repositioned and fell asleep. B/P emains low 30 minutes after Precedex decreased. 1 dose 5 mg Midodrine given.   
Pt dismantled Bipap tubing because she knew that would get someone into her room quicker. When asked why she did that she states \"Well I called out for you\". Pt had not rang the call light. Pt educated on the importance of wearing her BIPAP or at the very least her oxygen and the effects on her body if she takes them off. This RN explained that her body may take longer to recover when she does this and it doesn't appear that she understood. BIPAP replaced, Spo2 monitor intact and call light within reach of the pt   
Pt gave PRN midodrine 5 mg D/T Current BP is 80/50 MAP 58 . MD aware   
Pt had dark BM. Sent occult stool and result was positive. Notified Dr. Vickers, no new orders.   
Pt had removed previous NG at shift change. New NG placed, awaiting verification of placement from CXR.   
Pt has remained on bipap today. ABG improving. Pt does respond to name and spontaneous eye opening. VSS. Family updated.   
Pt incontinent of stool- brownish/green in color. Amanda care completed & purewick/attends replaced.   Pt remains NPO, NG in place. Waiting for TF from dietary.     
Pt not wearing Bipap tonight due to possible aspiration. Will continue to monitor  
Pt on PAP pulling at mask re-educated on need for PAP  pt repositioned and  attempting to sleep  
Pt placed on AVAP machine at shift change, shortly after pt called out saying she couldn't breathe. Upon assessment pt O2 was 51%. AVAP mask removed, oxygen reapplied, turned up and sat back WNL.  Pt stable. RT called to bedside, breathing treatment given,   C/o SOB and a feeling of something stuck in right side of esophagus.   Cont pulse ox added.   PS sent to MD, states OK to hold meds overnight. Flonase used. Swallow eval added.   All needs met, pt denies needs at this time   
Pt placed on Bipap Tube feeding turned off.   
Pt pulling off PAP devise  C/O nose hurting  placed on 6L NC for a break  
Pt refusing to wear BIPAP during this time.  
Pt removing Bipap oall night encouraged to keep it on  
Pt requesting PRN breathing treatment. RT notified. Pt weaned back to 3 liters nasal cannula.   
Pt resting in bed with eyes closed. Pt resp even and unlabored. Pt shows no s/s of distress. Pt bed is in the lowest position, bed alarm is on, and call light is within reach.    
Pt restless/agitated. Gave PRN ativan per order, see MAR.     
Pt seems much calmer after bath and bed change. B/P up then down to 81/54. Precedex decreased to 0.3 mcg/kg/hr.   
Pt switched from AVAPS to 4L NC. Pt alert and oriented to person, time, and situation. PRN zofran given for nausea.   
Pt taken to radiology.  
Pt tolerating Bipap well  
Pt wanted off of bipap, placed on 4L HFNC. Sp02 93%  
Pt with critical results from ABG with CO2 of 73 This RN notified Faustino Everett MD via perfect serve. Per MD, wean O2 for SPO2 88-92%.  
Pt with increased 's and RR increased to 30's.  Increased propofol to 20 mcg/kg/min  
Pts. Family brought in her AVAPs unit. Pt. Is currently wearing their AVAPS with 4.5 L Bled in. Spo2 96%.   Family states setting are Vt 500, Rate 14, IT 0.9, AVAPS 2-5 cm.   
Pulmonary Progress Note  CC: Lung nodules    Subjective:   AVAPS overnight   6L this am   Black tarry stool       Intake/Output Summary (Last 24 hours) at 1/13/2025 1026  Last data filed at 1/13/2025 0436  Gross per 24 hour   Intake 2394.58 ml   Output 500 ml   Net 1894.58 ml           EXAM: BP (!) 86/58   Pulse 86   Temp 98.2 °F (36.8 °C) (Oral)   Resp 17   Ht 1.626 m (5' 4.02\")   Wt 49.9 kg (109 lb 14.4 oz)   SpO2 97%   BMI 18.85 kg/m²  on AVAPS  Constitutional:  No acute distress.   Eyes: PERRL. Conjunctivae anicteric.   ENT: Normal nose. Normal tongue.    Neck:  Trachea is midline.   Respiratory: + accessory muscle usage. Decreased breath sounds. No wheezes. No rales. No Rhonchi.  Cardiovascular: Normal S1S2. No digit clubbing. No digit cyanosis. No LE edema.   Psychiatric: No anxiety or Agitation. Alert and Oriented to person, place and time.    Scheduled Meds:   predniSONE  20 mg Oral Daily    busPIRone  7.5 mg Oral BID    piperacillin-tazobactam  3,375 mg IntraVENous Q8H    mupirocin   Each Nostril BID    miconazole nitrate   Topical BID    guaiFENesin  600 mg Oral BID    ipratropium 0.5 mg-albuterol 2.5 mg  1 Dose Inhalation 4x Daily RT    sodium chloride flush  5-40 mL IntraVENous 2 times per day    atorvastatin  20 mg Oral Nightly    budesonide  0.5 mg Nebulization BID RT    docusate sodium  100 mg Oral Daily    apixaban  2.5 mg Oral BID    escitalopram  10 mg Oral Daily    [Held by provider] gabapentin  100 mg Oral TID    levothyroxine  25 mcg Oral Daily    magnesium oxide  400 mg Oral Daily    metoprolol tartrate  12.5 mg Oral BID    pantoprazole  40 mg Oral BID     Continuous Infusions:   dextrose 50 mL/hr at 01/13/25 0625    sodium chloride       PRN Meds:  LORazepam, sodium chloride flush, sodium chloride, magnesium sulfate, ondansetron **OR** ondansetron, polyethylene glycol, acetaminophen **OR** acetaminophen, albuterol, fluticasone, midodrine, albuterol sulfate HFA    Labs:  CBC:   Recent 
Pulmonary Progress Note  CC: Lung nodules    Subjective:   AVAPS overnight - EPAP 5, PS 10-20 and    40% FiO2   Precedex 0.3 mcg/kg/hr - off now when off BiPAP   No active bleed and stable H&H     Access: R IJ 1/16    Intake/Output Summary (Last 24 hours) at 1/17/2025 0775  Last data filed at 1/17/2025 0638  Gross per 24 hour   Intake 1193.56 ml   Output 200 ml   Net 993.56 ml           EXAM: BP (!) 96/47   Pulse 92   Temp 97.8 °F (36.6 °C) (Axillary)   Resp (!) 32   Ht 1.626 m (5' 4.02\")   Wt 46.6 kg (102 lb 11.2 oz)   SpO2 98%   BMI 17.62 kg/m²  on AVAPS  Constitutional:  + acute distress.   Eyes: PERRL. Conjunctivae anicteric.   ENT: Normal nose. Normal tongue.    Neck:  Trachea is midline.   Respiratory: + accessory muscle usage. Decreased breath sounds. + wheezes. No rales. No Rhonchi.  Cardiovascular: Normal S1S2. No digit clubbing. No digit cyanosis. No LE edema.   Psychiatric: No anxiety or Agitation. Alert and Oriented to person, place and time.    Scheduled Meds:   furosemide  20 mg IntraVENous BID    methylPREDNISolone  40 mg IntraVENous Q12H    pantoprazole (PROTONIX) 40 mg in sodium chloride (PF) 0.9 % 10 mL injection  40 mg IntraVENous BID    busPIRone  7.5 mg Oral BID    miconazole nitrate   Topical BID    ipratropium 0.5 mg-albuterol 2.5 mg  1 Dose Inhalation 4x Daily RT    sodium chloride flush  5-40 mL IntraVENous 2 times per day    atorvastatin  20 mg Oral Nightly    budesonide  0.5 mg Nebulization BID RT    docusate sodium  100 mg Oral Daily    [Held by provider] apixaban  2.5 mg Oral BID    escitalopram  10 mg Oral Daily    [Held by provider] gabapentin  100 mg Oral TID    levothyroxine  25 mcg Oral Daily    magnesium oxide  400 mg Oral Daily    metoprolol tartrate  12.5 mg Oral BID     Continuous Infusions:   heparin (PORCINE) Infusion 16 Units/kg/hr (01/17/25 0600)    dexmedeTOMIDine HCl in NaCl Stopped (01/17/25 0638)    sodium chloride      sodium chloride Stopped (01/15/25 
Pulmonary Progress Note  CC: Lung nodules    Subjective:   AVAPS overnight - EPAP 5, PS 10-20 and    40% FiO2   Precedex 0.4 mcg/kg/hr   No active bleed and stable H&H   Limited IV access - not able to place PICC/midline      Intake/Output Summary (Last 24 hours) at 1/16/2025 0735  Last data filed at 1/16/2025 0622  Gross per 24 hour   Intake 704.82 ml   Output 800 ml   Net -95.18 ml           EXAM: BP (!) 156/83   Pulse 63   Temp 96.9 °F (36.1 °C) (Temporal)   Resp 17   Ht 1.626 m (5' 4.02\")   Wt 46.6 kg (102 lb 11.2 oz)   SpO2 100%   BMI 17.62 kg/m²  on AVAPS  Constitutional:  + acute distress.   Eyes: PERRL. Conjunctivae anicteric.   ENT: Normal nose. Normal tongue.    Neck:  Trachea is midline.   Respiratory: + accessory muscle usage. Decreased breath sounds. + wheezes. No rales. No Rhonchi.  Cardiovascular: Normal S1S2. No digit clubbing. No digit cyanosis. No LE edema.   Psychiatric: No anxiety or Agitation. Alert and Oriented to person, place and time.    Scheduled Meds:   methylPREDNISolone  40 mg IntraVENous Q12H    pantoprazole (PROTONIX) 40 mg in sodium chloride (PF) 0.9 % 10 mL injection  40 mg IntraVENous BID    busPIRone  7.5 mg Oral BID    piperacillin-tazobactam  3,375 mg IntraVENous Q8H    miconazole nitrate   Topical BID    ipratropium 0.5 mg-albuterol 2.5 mg  1 Dose Inhalation 4x Daily RT    sodium chloride flush  5-40 mL IntraVENous 2 times per day    atorvastatin  20 mg Oral Nightly    budesonide  0.5 mg Nebulization BID RT    docusate sodium  100 mg Oral Daily    [Held by provider] apixaban  2.5 mg Oral BID    escitalopram  10 mg Oral Daily    [Held by provider] gabapentin  100 mg Oral TID    levothyroxine  25 mcg Oral Daily    magnesium oxide  400 mg Oral Daily    metoprolol tartrate  12.5 mg Oral BID     Continuous Infusions:   dexmedeTOMIDine HCl in NaCl 0.6 mcg/kg/hr (01/16/25 0622)    sodium chloride      sodium chloride Stopped (01/15/25 1007)     PRN Meds:  potassium 
Pulmonary Progress Note  CC: Lung nodules    Subjective:   AVAPS overnight - EPAP 5, PS 10-20 and    6L this am     Worse mentation and VBG worse     Intake/Output Summary (Last 24 hours) at 1/15/2025 0719  Last data filed at 1/15/2025 0553  Gross per 24 hour   Intake 1666.12 ml   Output 400 ml   Net 1266.12 ml           EXAM: BP (!) 157/139   Pulse 98   Temp 97.5 °F (36.4 °C) (Temporal)   Resp 13   Ht 1.626 m (5' 4.02\")   Wt 49.9 kg (109 lb 14.4 oz)   SpO2 100%   BMI 18.85 kg/m²  on AVAPS  Constitutional:  + acute distress.   Eyes: PERRL. Conjunctivae anicteric.   ENT: Normal nose. Normal tongue.    Neck:  Trachea is midline.   Respiratory: + accessory muscle usage. Decreased breath sounds. + wheezes. No rales. No Rhonchi.  Cardiovascular: Normal S1S2. No digit clubbing. No digit cyanosis. No LE edema.   Psychiatric: No anxiety or Agitation. Alert and Oriented to person, place and time.    Scheduled Meds:   potassium chloride  10 mEq IntraVENous Q1H    methylPREDNISolone  40 mg IntraVENous Q12H    pantoprazole (PROTONIX) 40 mg in sodium chloride (PF) 0.9 % 10 mL injection  40 mg IntraVENous BID    busPIRone  7.5 mg Oral BID    piperacillin-tazobactam  3,375 mg IntraVENous Q8H    mupirocin   Each Nostril BID    miconazole nitrate   Topical BID    ipratropium 0.5 mg-albuterol 2.5 mg  1 Dose Inhalation 4x Daily RT    sodium chloride flush  5-40 mL IntraVENous 2 times per day    atorvastatin  20 mg Oral Nightly    budesonide  0.5 mg Nebulization BID RT    docusate sodium  100 mg Oral Daily    [Held by provider] apixaban  2.5 mg Oral BID    escitalopram  10 mg Oral Daily    [Held by provider] gabapentin  100 mg Oral TID    levothyroxine  25 mcg Oral Daily    magnesium oxide  400 mg Oral Daily    metoprolol tartrate  12.5 mg Oral BID     Continuous Infusions:   dexmedeTOMIDine HCl in NaCl Stopped (01/14/25 1204)    sodium chloride      dextrose Stopped (01/14/25 1326)    sodium chloride 25 mL/hr at 01/15/25 
Pulmonary Progress Note  CC: Lung nodules    Subjective:   AVAPS overnight - would not leave it on   6L this am   Congested cough with IWB placed on AVAPS with me at bedside EPAP 5, PS 10-20 and    Worse mentation and VBG worse     Intake/Output Summary (Last 24 hours) at 1/14/2025 0736  Last data filed at 1/14/2025 0634  Gross per 24 hour   Intake 1636.43 ml   Output --   Net 1636.43 ml           EXAM: /76   Pulse (!) 108   Temp 97.7 °F (36.5 °C) (Oral)   Resp 24   Ht 1.626 m (5' 4.02\")   Wt 49.9 kg (109 lb 14.4 oz)   SpO2 94%   BMI 18.85 kg/m²  on AVAPS  Constitutional:  + acute distress.   Eyes: PERRL. Conjunctivae anicteric.   ENT: Normal nose. Normal tongue.    Neck:  Trachea is midline.   Respiratory: + accessory muscle usage. Decreased breath sounds. + wheezes. No rales. No Rhonchi.  Cardiovascular: Normal S1S2. No digit clubbing. No digit cyanosis. No LE edema.   Psychiatric: No anxiety or Agitation. Alert and Oriented to person, place and time.    Scheduled Meds:   pantoprazole (PROTONIX) 40 mg in sodium chloride (PF) 0.9 % 10 mL injection  40 mg IntraVENous BID    predniSONE  20 mg Oral Daily    busPIRone  7.5 mg Oral BID    piperacillin-tazobactam  3,375 mg IntraVENous Q8H    mupirocin   Each Nostril BID    miconazole nitrate   Topical BID    ipratropium 0.5 mg-albuterol 2.5 mg  1 Dose Inhalation 4x Daily RT    sodium chloride flush  5-40 mL IntraVENous 2 times per day    atorvastatin  20 mg Oral Nightly    budesonide  0.5 mg Nebulization BID RT    docusate sodium  100 mg Oral Daily    [Held by provider] apixaban  2.5 mg Oral BID    escitalopram  10 mg Oral Daily    [Held by provider] gabapentin  100 mg Oral TID    levothyroxine  25 mcg Oral Daily    magnesium oxide  400 mg Oral Daily    metoprolol tartrate  12.5 mg Oral BID     Continuous Infusions:   sodium chloride      dextrose 50 mL/hr at 01/14/25 0707    sodium chloride       PRN Meds:  sodium chloride, 
Pulmonary Progress Note  CC: Lung nodules    Subjective:   AVAPS overnight, limited use, felt pressure too high.  Currently on EPAP 5, PS 10-20 and    40% FiO2   Precedex off   No active bleed and stable H&H     Access: R IJ 1/16    Intake/Output Summary (Last 24 hours) at 1/18/2025 0740  Last data filed at 1/18/2025 0600  Gross per 24 hour   Intake 2086.13 ml   Output --   Net 2086.13 ml           EXAM: BP (!) 100/52   Pulse 76   Temp 98.1 °F (36.7 °C) (Axillary)   Resp 19   Ht 1.626 m (5' 4.02\")   Wt 46.6 kg (102 lb 11.2 oz)   SpO2 100%   BMI 17.62 kg/m²    Constitutional:  No acute distress.   Eyes: PERRL. Conjunctivae anicteric.   ENT: Normal nose. Normal tongue.    Neck:  Trachea is midline.   Respiratory: Mild accessory muscle usage. Decreased breath sounds. + wheezes. No rales. No Rhonchi.  Cardiovascular: Normal S1S2. No digit clubbing. No digit cyanosis. No LE edema.   Psychiatric: No anxiety or Agitation. Alert and Oriented to person, place and time.    Scheduled Meds:   furosemide  20 mg IntraVENous BID    methylPREDNISolone  40 mg IntraVENous Q12H    pantoprazole (PROTONIX) 40 mg in sodium chloride (PF) 0.9 % 10 mL injection  40 mg IntraVENous BID    busPIRone  7.5 mg Oral BID    miconazole nitrate   Topical BID    ipratropium 0.5 mg-albuterol 2.5 mg  1 Dose Inhalation 4x Daily RT    sodium chloride flush  5-40 mL IntraVENous 2 times per day    atorvastatin  20 mg Oral Nightly    budesonide  0.5 mg Nebulization BID RT    docusate sodium  100 mg Oral Daily    [Held by provider] apixaban  2.5 mg Oral BID    escitalopram  10 mg Oral Daily    [Held by provider] gabapentin  100 mg Oral TID    levothyroxine  25 mcg Oral Daily    magnesium oxide  400 mg Oral Daily    metoprolol tartrate  12.5 mg Oral BID     Continuous Infusions:   heparin (PORCINE) Infusion 18 Units/kg/hr (01/18/25 0600)    dexmedeTOMIDine HCl in NaCl Stopped (01/18/25 0105)    sodium chloride      sodium chloride Stopped 
Pulmonary Progress Note  CC: Lung nodules    Subjective:   Pt wore avaps via facemask most of the day and overnight    EXAM: /78   Pulse (!) 103   Temp 97.1 °F (36.2 °C) (Temporal)   Resp 27   Ht 1.626 m (5' 4.02\")   Wt 49.9 kg (109 lb 14.4 oz)   SpO2 100%   BMI 18.85 kg/m²  on AVAPS  Constitutional:  No acute distress.   Eyes: PERRL. Conjunctivae anicteric.   ENT: Normal nose. Normal tongue.    Neck:  Trachea is midline.   Respiratory: + accessory muscle usage. Decreased breath sounds. No wheezes. No rales. No Rhonchi.  Cardiovascular: Normal S1S2. No digit clubbing. No digit cyanosis. No LE edema.   Psychiatric: No anxiety or Agitation. Alert and Oriented to person, place and time.    Scheduled Meds:   potassium chloride  40 mEq Oral Daily    piperacillin-tazobactam  3,375 mg IntraVENous Q8H    mupirocin   Each Nostril BID    lidocaine 1 % injection  50 mg IntraDERmal Once    miconazole nitrate   Topical BID    metoprolol  5 mg IntraVENous Q8H    guaiFENesin  600 mg Oral BID    ipratropium 0.5 mg-albuterol 2.5 mg  1 Dose Inhalation 4x Daily RT    predniSONE  40 mg Oral Daily    sodium chloride flush  5-40 mL IntraVENous 2 times per day    atorvastatin  20 mg Oral Nightly    budesonide  0.5 mg Nebulization BID RT    [Held by provider] busPIRone  15 mg Oral BID    docusate sodium  100 mg Oral Daily    apixaban  2.5 mg Oral BID    [Held by provider] escitalopram  10 mg Oral Daily    [Held by provider] furosemide  20 mg Oral Daily    [Held by provider] gabapentin  100 mg Oral TID    levothyroxine  25 mcg Oral Daily    magnesium oxide  400 mg Oral Daily    metoprolol tartrate  12.5 mg Oral BID    pantoprazole  40 mg Oral BID    Roflumilast  500 mcg Oral Daily     Continuous Infusions:   [Held by provider] dextrose 5 % and 0.45 % NaCl Stopped (01/10/25 3597)    sodium chloride       PRN Meds:  HYDROmorphone, sodium chloride flush, sodium chloride, magnesium sulfate, ondansetron **OR** ondansetron, 
Pulmonary Progress Note  CC: Lung nodules    Subjective:   Pt wore avaps via facemask most of the day and overnight    EXAM: /84   Pulse (!) 111   Temp 96.9 °F (36.1 °C) (Temporal)   Resp 30   Ht 1.626 m (5' 4.02\")   Wt 49.9 kg (109 lb 14.4 oz)   SpO2 95%   BMI 18.85 kg/m²  on AVAPS  Constitutional:  No acute distress.   Eyes: PERRL. Conjunctivae anicteric.   ENT: Normal nose. Normal tongue.    Neck:  Trachea is midline.   Respiratory: + accessory muscle usage. Decreased breath sounds. No wheezes. No rales. No Rhonchi.  Cardiovascular: Normal S1S2. No digit clubbing. No digit cyanosis. No LE edema.   Psychiatric: No anxiety or Agitation. Alert and Oriented to person, place and time.    Scheduled Meds:   busPIRone  7.5 mg Oral BID    piperacillin-tazobactam  3,375 mg IntraVENous Q8H    mupirocin   Each Nostril BID    miconazole nitrate   Topical BID    guaiFENesin  600 mg Oral BID    ipratropium 0.5 mg-albuterol 2.5 mg  1 Dose Inhalation 4x Daily RT    predniSONE  40 mg Oral Daily    sodium chloride flush  5-40 mL IntraVENous 2 times per day    atorvastatin  20 mg Oral Nightly    budesonide  0.5 mg Nebulization BID RT    docusate sodium  100 mg Oral Daily    apixaban  2.5 mg Oral BID    escitalopram  10 mg Oral Daily    [Held by provider] gabapentin  100 mg Oral TID    levothyroxine  25 mcg Oral Daily    magnesium oxide  400 mg Oral Daily    metoprolol tartrate  12.5 mg Oral BID    pantoprazole  40 mg Oral BID    Roflumilast  500 mcg Oral Daily     Continuous Infusions:   dextrose      sodium chloride       PRN Meds:  LORazepam, HYDROmorphone, sodium chloride flush, sodium chloride, magnesium sulfate, ondansetron **OR** ondansetron, polyethylene glycol, acetaminophen **OR** acetaminophen, albuterol, fluticasone, midodrine, albuterol sulfate HFA    Labs:  CBC:   Recent Labs     01/10/25  0917 01/11/25  0549 01/12/25  0251   WBC 20.9* 19.8* 15.7*   HGB 8.0* 7.6* 7.1*   HCT 26.2* 25.0* 23.3*   MCV 95.0 95.3 
Pulmonary Progress Note  CC: Lung nodules    Subjective:   The patient did not wear AVAPS last night and now hypoxia is worse and her CO2 was elevated and her pH is 7 point    EXAM: BP (!) 142/86   Pulse (!) 124   Temp 98.2 °F (36.8 °C) (Axillary)   Resp 22   Ht 1.626 m (5' 4.02\")   Wt 44.9 kg (98 lb 14.4 oz)   SpO2 100%   BMI 16.97 kg/m²  on AVAPS  Constitutional:  No acute distress.   Eyes: PERRL. Conjunctivae anicteric.   ENT: Normal nose. Normal tongue.    Neck:  Trachea is midline.   Respiratory: + accessory muscle usage. Decreased breath sounds. No wheezes. No rales. No Rhonchi.  Cardiovascular: Normal S1S2. No digit clubbing. No digit cyanosis. No LE edema.   Psychiatric: No anxiety or Agitation. Alert and Oriented to person, place and time.    Scheduled Meds:   piperacillin-tazobactam  3,375 mg IntraVENous Q8H    mupirocin   Each Nostril BID    guaiFENesin  600 mg Oral BID    ipratropium 0.5 mg-albuterol 2.5 mg  1 Dose Inhalation 4x Daily RT    predniSONE  40 mg Oral Daily    sodium chloride flush  5-40 mL IntraVENous 2 times per day    atorvastatin  20 mg Oral Nightly    budesonide  0.5 mg Nebulization BID RT    [Held by provider] busPIRone  15 mg Oral BID    docusate sodium  100 mg Oral Daily    apixaban  2.5 mg Oral BID    [Held by provider] escitalopram  10 mg Oral Daily    [Held by provider] furosemide  20 mg Oral Daily    [Held by provider] gabapentin  100 mg Oral TID    levothyroxine  25 mcg Oral Daily    magnesium oxide  400 mg Oral Daily    metoprolol tartrate  12.5 mg Oral BID    pantoprazole  40 mg Oral BID    Roflumilast  500 mcg Oral Daily     Continuous Infusions:   dextrose 5 % and 0.45 % NaCl      sodium chloride       PRN Meds:  HYDROmorphone, sodium chloride flush, sodium chloride, magnesium sulfate, ondansetron **OR** ondansetron, polyethylene glycol, acetaminophen **OR** acetaminophen, albuterol, fluticasone, midodrine, albuterol sulfate HFA    Labs:  CBC:   Recent Labs     
Pulmonary Progress Note  CC: Lung nodules    Subjective:  c/o SOB      EXAM: /74   Pulse 89   Temp 97.9 °F (36.6 °C) (Axillary)   Resp 20   Ht 1.626 m (5' 4.02\")   Wt 47.2 kg (104 lb)   SpO2 98%   BMI 17.84 kg/m²  on 3L  Constitutional:  No acute distress.   Eyes: PERRL. Conjunctivae anicteric.   ENT: Normal nose. Normal tongue.    Neck:  Trachea is midline.   Respiratory: No accessory muscle usage. Decreased breath sounds. No wheezes. No rales. No Rhonchi.  Cardiovascular: Normal S1S2. No digit clubbing. No digit cyanosis. No LE edema.   Psychiatric: No anxiety or Agitation. Alert and Oriented to person, place and time.    Scheduled Meds:   guaiFENesin  600 mg Oral BID    ipratropium 0.5 mg-albuterol 2.5 mg  1 Dose Inhalation 4x Daily RT    predniSONE  40 mg Oral Daily    piperacillin-tazobactam  3,375 mg IntraVENous Q8H    sodium chloride flush  5-40 mL IntraVENous 2 times per day    atorvastatin  20 mg Oral Nightly    budesonide  0.5 mg Nebulization BID RT    busPIRone  15 mg Oral BID    docusate sodium  100 mg Oral Daily    [Held by provider] apixaban  2.5 mg Oral BID    escitalopram  10 mg Oral Daily    [Held by provider] furosemide  20 mg Oral Daily    gabapentin  100 mg Oral TID    levothyroxine  25 mcg Oral Daily    magnesium oxide  400 mg Oral Daily    metoprolol tartrate  12.5 mg Oral BID    pantoprazole  40 mg Oral BID    Roflumilast  500 mcg Oral Daily     Continuous Infusions:   sodium chloride       PRN Meds:  HYDROmorphone, sodium chloride flush, sodium chloride, potassium chloride **OR** potassium alternative oral replacement **OR** potassium chloride, magnesium sulfate, ondansetron **OR** ondansetron, polyethylene glycol, acetaminophen **OR** acetaminophen, haloperidol lactate, albuterol, busPIRone, fluticasone, LORazepam, meclizine, midodrine, albuterol sulfate HFA    Labs:  CBC:   Recent Labs     01/04/25  0859 01/05/25  0627 01/06/25  0641   WBC 9.4 8.0 6.1   HGB 9.6* 8.5* 7.9*   HCT 
Pulmonary Progress Note  CC: Lung nodules    Subjective:  c/o SOB and trouble swallowing      EXAM: BP (!) 145/72   Pulse 93   Temp 97.7 °F (36.5 °C) (Oral)   Resp 18   Ht 1.626 m (5' 4.02\")   Wt 44.9 kg (98 lb 14.4 oz)   SpO2 93%   BMI 16.97 kg/m²  on 3L  Constitutional:  No acute distress.   Eyes: PERRL. Conjunctivae anicteric.   ENT: Normal nose. Normal tongue.    Neck:  Trachea is midline.   Respiratory: No accessory muscle usage. Decreased breath sounds. No wheezes. No rales. No Rhonchi.  Cardiovascular: Normal S1S2. No digit clubbing. No digit cyanosis. No LE edema.   Psychiatric: No anxiety or Agitation. Alert and Oriented to person, place and time.    Scheduled Meds:   potassium chloride  40 mEq Oral Once    amoxicillin-clavulanate  1 tablet Oral 2 times per day    guaiFENesin  600 mg Oral BID    ipratropium 0.5 mg-albuterol 2.5 mg  1 Dose Inhalation 4x Daily RT    predniSONE  40 mg Oral Daily    sodium chloride flush  5-40 mL IntraVENous 2 times per day    atorvastatin  20 mg Oral Nightly    budesonide  0.5 mg Nebulization BID RT    busPIRone  15 mg Oral BID    docusate sodium  100 mg Oral Daily    apixaban  2.5 mg Oral BID    escitalopram  10 mg Oral Daily    furosemide  20 mg Oral Daily    gabapentin  100 mg Oral TID    levothyroxine  25 mcg Oral Daily    magnesium oxide  400 mg Oral Daily    metoprolol tartrate  12.5 mg Oral BID    pantoprazole  40 mg Oral BID    Roflumilast  500 mcg Oral Daily     Continuous Infusions:   sodium chloride Stopped (01/08/25 1019)    sodium chloride       PRN Meds:  HYDROmorphone, sodium chloride flush, sodium chloride, magnesium sulfate, ondansetron **OR** ondansetron, polyethylene glycol, acetaminophen **OR** acetaminophen, haloperidol lactate, albuterol, busPIRone, fluticasone, LORazepam, meclizine, midodrine, albuterol sulfate HFA    Labs:  CBC:   Recent Labs     01/06/25  0641 01/07/25  0552 01/08/25  0835   WBC 6.1 4.6 9.7   HGB 7.9* 8.0* 8.4*   HCT 26.7* 25.7* 
Pulmonary Progress Note  CC: Lung nodules    Subjective:  not tolerating her diet today. Possibly aspirated and is back on AVAPS via facemask      EXAM: BP (!) 133/101   Pulse 100   Temp 98 °F (36.7 °C) (Oral)   Resp 20   Ht 1.626 m (5' 4.02\")   Wt 44.9 kg (98 lb 14.4 oz)   SpO2 91%   BMI 16.97 kg/m²  on AVAPS 60% Fio2  Constitutional:  No acute distress.   Eyes: PERRL. Conjunctivae anicteric.   ENT: Normal nose. Normal tongue.    Neck:  Trachea is midline.   Respiratory: + accessory muscle usage. Decreased breath sounds. No wheezes. No rales. No Rhonchi.  Cardiovascular: Normal S1S2. No digit clubbing. No digit cyanosis. No LE edema.   Psychiatric: No anxiety or Agitation. Alert and Oriented to person, place and time.    Scheduled Meds:   amoxicillin-clavulanate  1 tablet Oral 2 times per day    guaiFENesin  600 mg Oral BID    ipratropium 0.5 mg-albuterol 2.5 mg  1 Dose Inhalation 4x Daily RT    predniSONE  40 mg Oral Daily    sodium chloride flush  5-40 mL IntraVENous 2 times per day    atorvastatin  20 mg Oral Nightly    budesonide  0.5 mg Nebulization BID RT    busPIRone  15 mg Oral BID    docusate sodium  100 mg Oral Daily    apixaban  2.5 mg Oral BID    escitalopram  10 mg Oral Daily    furosemide  20 mg Oral Daily    gabapentin  100 mg Oral TID    levothyroxine  25 mcg Oral Daily    magnesium oxide  400 mg Oral Daily    metoprolol tartrate  12.5 mg Oral BID    pantoprazole  40 mg Oral BID    Roflumilast  500 mcg Oral Daily     Continuous Infusions:   sodium chloride 75 mL/hr at 01/09/25 0518    sodium chloride       PRN Meds:  HYDROmorphone, sodium chloride flush, sodium chloride, magnesium sulfate, ondansetron **OR** ondansetron, polyethylene glycol, acetaminophen **OR** acetaminophen, haloperidol lactate, albuterol, busPIRone, fluticasone, LORazepam, meclizine, midodrine, albuterol sulfate HFA    Labs:  CBC:   Recent Labs     01/07/25  0552 01/08/25  0835   WBC 4.6 9.7   HGB 8.0* 8.4*   HCT 25.7* 
RN entered room to see pt had vomited. SPO2 79% on 3 liters nasal cannula. Pt turned up to 15L high flow with SPO2 up to 86% so rapid response called. Dr. Roxana Mcfadden, RT, and clinical to bedside. Pt already on antibiotics to cover aspiration per MD and order received for ABG in morning. Pt now 96% on 15L after breathing treatments. Attempt to wean for SPO2 90 or above.   
RT Inhaler-Nebulizer Bronchodilator Protocol Note    There is a bronchodilator order in the chart from a provider indicating to follow the RT Bronchodilator Protocol and there is an “Initiate RT Inhaler-Nebulizer Bronchodilator Protocol” order as well (see protocol at bottom of note).    CXR Findings:  No results found.    The findings from the last RT Protocol Assessment were as follows:   History Pulmonary Disease: (P) Chronic pulmonary disease  Respiratory Pattern: (P) Dyspnea on exertion or RR 21-25 bpm  Breath Sounds: (P) Inspiratory and expiratory or bilateral wheezing and/or rhonchi  Cough: (P) Strong, productive  Indication for Bronchodilator Therapy: (P) Decreased or absent breath sounds  Bronchodilator Assessment Score: (P) 11    Aerosolized bronchodilator medication orders have been revised according to the RT Inhaler-Nebulizer Bronchodilator Protocol below.    Respiratory Therapist to perform RT Therapy Protocol Assessment initially then follow the protocol.  Repeat RT Therapy Protocol Assessment PRN for score 0-3 or on second treatment, BID, and PRN for scores above 3.    No Indications - adjust the frequency to every 6 hours PRN wheezing or bronchospasm, if no treatments needed after 48 hours then discontinue using Per Protocol order mode.     If indication present, adjust the RT bronchodilator orders based on the Bronchodilator Assessment Score as indicated below.  Use Inhaler orders unless patient has one or more of the following: on home nebulizer, not able to hold breath for 10 seconds, is not alert and oriented, cannot activate and use MDI correctly, or respiratory rate 25 breaths per minute or more, then use the equivalent nebulizer order(s) with same Frequency and PRN reasons based on the score.  If a patient is on this medication at home then do not decrease Frequency below that used at home.    0-3 - enter or revise RT bronchodilator order(s) to equivalent RT Bronchodilator order with 
RT Inhaler-Nebulizer Bronchodilator Protocol Note    There is a bronchodilator order in the chart from a provider indicating to follow the RT Bronchodilator Protocol and there is an “Initiate RT Inhaler-Nebulizer Bronchodilator Protocol” order as well (see protocol at bottom of note).    CXR Findings:  No results found.    The findings from the last RT Protocol Assessment were as follows:   History Pulmonary Disease: (P) Chronic pulmonary disease  Respiratory Pattern: (P) Dyspnea on exertion or RR 21-25 bpm  Breath Sounds: (P) Severe inspiratory and expiratory wheezing or severely diminished  Cough: (P) Strong, productive  Indication for Bronchodilator Therapy: (P) Decreased or absent breath sounds  Bronchodilator Assessment Score: (P) 13    Aerosolized bronchodilator medication orders have been revised according to the RT Inhaler-Nebulizer Bronchodilator Protocol below.    Respiratory Therapist to perform RT Therapy Protocol Assessment initially then follow the protocol.  Repeat RT Therapy Protocol Assessment PRN for score 0-3 or on second treatment, BID, and PRN for scores above 3.    No Indications - adjust the frequency to every 6 hours PRN wheezing or bronchospasm, if no treatments needed after 48 hours then discontinue using Per Protocol order mode.     If indication present, adjust the RT bronchodilator orders based on the Bronchodilator Assessment Score as indicated below.  Use Inhaler orders unless patient has one or more of the following: on home nebulizer, not able to hold breath for 10 seconds, is not alert and oriented, cannot activate and use MDI correctly, or respiratory rate 25 breaths per minute or more, then use the equivalent nebulizer order(s) with same Frequency and PRN reasons based on the score.  If a patient is on this medication at home then do not decrease Frequency below that used at home.    0-3 - enter or revise RT bronchodilator order(s) to equivalent RT Bronchodilator order with 
RT Inhaler-Nebulizer Bronchodilator Protocol Note    There is a bronchodilator order in the chart from a provider indicating to follow the RT Bronchodilator Protocol and there is an “Initiate RT Inhaler-Nebulizer Bronchodilator Protocol” order as well (see protocol at bottom of note).    CXR Findings:  No results found.    The findings from the last RT Protocol Assessment were as follows:   History Pulmonary Disease: (P) Chronic pulmonary disease  Respiratory Pattern: (P) Dyspnea on exertion or RR 21-25 bpm  Breath Sounds: (P) Slightly diminished and/or crackles  Cough: (P) Strong, spontaneous, non-productive  Indication for Bronchodilator Therapy: (P) Decreased or absent breath sounds  Bronchodilator Assessment Score: (P) 6    Aerosolized bronchodilator medication orders have been revised according to the RT Inhaler-Nebulizer Bronchodilator Protocol below.    Respiratory Therapist to perform RT Therapy Protocol Assessment initially then follow the protocol.  Repeat RT Therapy Protocol Assessment PRN for score 0-3 or on second treatment, BID, and PRN for scores above 3.    No Indications - adjust the frequency to every 6 hours PRN wheezing or bronchospasm, if no treatments needed after 48 hours then discontinue using Per Protocol order mode.     If indication present, adjust the RT bronchodilator orders based on the Bronchodilator Assessment Score as indicated below.  Use Inhaler orders unless patient has one or more of the following: on home nebulizer, not able to hold breath for 10 seconds, is not alert and oriented, cannot activate and use MDI correctly, or respiratory rate 25 breaths per minute or more, then use the equivalent nebulizer order(s) with same Frequency and PRN reasons based on the score.  If a patient is on this medication at home then do not decrease Frequency below that used at home.    0-3 - enter or revise RT bronchodilator order(s) to equivalent RT Bronchodilator order with Frequency of every 4 
RT Inhaler-Nebulizer Bronchodilator Protocol Note    There is a bronchodilator order in the chart from a provider indicating to follow the RT Bronchodilator Protocol and there is an “Initiate RT Inhaler-Nebulizer Bronchodilator Protocol” order as well (see protocol at bottom of note).    CXR Findings:  No results found.    The findings from the last RT Protocol Assessment were as follows:   History Pulmonary Disease: (P) Chronic pulmonary disease  Respiratory Pattern: (P) Dyspnea on exertion or RR 21-25 bpm  Breath Sounds: (P) Slightly diminished and/or crackles  Cough: (P) Strong, spontaneous, non-productive  Indication for Bronchodilator Therapy: (P) On home bronchodilators  Bronchodilator Assessment Score: (P) 6    Aerosolized bronchodilator medication orders have been revised according to the RT Inhaler-Nebulizer Bronchodilator Protocol below.    Respiratory Therapist to perform RT Therapy Protocol Assessment initially then follow the protocol.  Repeat RT Therapy Protocol Assessment PRN for score 0-3 or on second treatment, BID, and PRN for scores above 3.    No Indications - adjust the frequency to every 6 hours PRN wheezing or bronchospasm, if no treatments needed after 48 hours then discontinue using Per Protocol order mode.     If indication present, adjust the RT bronchodilator orders based on the Bronchodilator Assessment Score as indicated below.  Use Inhaler orders unless patient has one or more of the following: on home nebulizer, not able to hold breath for 10 seconds, is not alert and oriented, cannot activate and use MDI correctly, or respiratory rate 25 breaths per minute or more, then use the equivalent nebulizer order(s) with same Frequency and PRN reasons based on the score.  If a patient is on this medication at home then do not decrease Frequency below that used at home.    0-3 - enter or revise RT bronchodilator order(s) to equivalent RT Bronchodilator order with Frequency of every 4 hours PRN 
RT Inhaler-Nebulizer Bronchodilator Protocol Note    There is a bronchodilator order in the chart from a provider indicating to follow the RT Bronchodilator Protocol and there is an “Initiate RT Inhaler-Nebulizer Bronchodilator Protocol” order as well (see protocol at bottom of note).    CXR Findings:  No results found.    The findings from the last RT Protocol Assessment were as follows:   History Pulmonary Disease: Chronic pulmonary disease  Respiratory Pattern: Dyspnea on exertion or RR 21-25 bpm  Breath Sounds: Slightly diminished and/or crackles  Cough: Strong, spontaneous, non-productive  Indication for Bronchodilator Therapy: Decreased or absent breath sounds  Bronchodilator Assessment Score: 6    Aerosolized bronchodilator medication orders have been revised according to the RT Inhaler-Nebulizer Bronchodilator Protocol below.    Respiratory Therapist to perform RT Therapy Protocol Assessment initially then follow the protocol.  Repeat RT Therapy Protocol Assessment PRN for score 0-3 or on second treatment, BID, and PRN for scores above 3.    No Indications - adjust the frequency to every 6 hours PRN wheezing or bronchospasm, if no treatments needed after 48 hours then discontinue using Per Protocol order mode.     If indication present, adjust the RT bronchodilator orders based on the Bronchodilator Assessment Score as indicated below.  Use Inhaler orders unless patient has one or more of the following: on home nebulizer, not able to hold breath for 10 seconds, is not alert and oriented, cannot activate and use MDI correctly, or respiratory rate 25 breaths per minute or more, then use the equivalent nebulizer order(s) with same Frequency and PRN reasons based on the score.  If a patient is on this medication at home then do not decrease Frequency below that used at home.    0-3 - enter or revise RT bronchodilator order(s) to equivalent RT Bronchodilator order with Frequency of every 4 hours PRN for wheezing 
RT Inhaler-Nebulizer Bronchodilator Protocol Note    There is a bronchodilator order in the chart from a provider indicating to follow the RT Bronchodilator Protocol and there is an “Initiate RT Inhaler-Nebulizer Bronchodilator Protocol” order as well (see protocol at bottom of note).    CXR Findings:  No results found.    The findings from the last RT Protocol Assessment were as follows:   History Pulmonary Disease: Chronic pulmonary disease  Respiratory Pattern: Dyspnea on exertion or RR 21-25 bpm  Breath Sounds: Slightly diminished and/or crackles  Cough: Strong, spontaneous, non-productive  Indication for Bronchodilator Therapy: Decreased or absent breath sounds  Bronchodilator Assessment Score: 6    Aerosolized bronchodilator medication orders have been revised according to the RT Inhaler-Nebulizer Bronchodilator Protocol below.    Respiratory Therapist to perform RT Therapy Protocol Assessment initially then follow the protocol.  Repeat RT Therapy Protocol Assessment PRN for score 0-3 or on second treatment, BID, and PRN for scores above 3.    No Indications - adjust the frequency to every 6 hours PRN wheezing or bronchospasm, if no treatments needed after 48 hours then discontinue using Per Protocol order mode.     If indication present, adjust the RT bronchodilator orders based on the Bronchodilator Assessment Score as indicated below.  Use Inhaler orders unless patient has one or more of the following: on home nebulizer, not able to hold breath for 10 seconds, is not alert and oriented, cannot activate and use MDI correctly, or respiratory rate 25 breaths per minute or more, then use the equivalent nebulizer order(s) with same Frequency and PRN reasons based on the score.  If a patient is on this medication at home then do not decrease Frequency below that used at home.    0-3 - enter or revise RT bronchodilator order(s) to equivalent RT Bronchodilator order with Frequency of every 4 hours PRN for wheezing 
RT Inhaler-Nebulizer Bronchodilator Protocol Note    There is a bronchodilator order in the chart from a provider indicating to follow the RT Bronchodilator Protocol and there is an “Initiate RT Inhaler-Nebulizer Bronchodilator Protocol” order as well (see protocol at bottom of note).    CXR Findings:  No results found.    The findings from the last RT Protocol Assessment were as follows:   History Pulmonary Disease: Chronic pulmonary disease  Respiratory Pattern: Dyspnea on exertion or RR 21-25 bpm  Breath Sounds: Slightly diminished and/or crackles  Cough: Strong, spontaneous, non-productive  Indication for Bronchodilator Therapy: On home bronchodilators  Bronchodilator Assessment Score: 6    Aerosolized bronchodilator medication orders have been revised according to the RT Inhaler-Nebulizer Bronchodilator Protocol below.    Respiratory Therapist to perform RT Therapy Protocol Assessment initially then follow the protocol.  Repeat RT Therapy Protocol Assessment PRN for score 0-3 or on second treatment, BID, and PRN for scores above 3.    No Indications - adjust the frequency to every 6 hours PRN wheezing or bronchospasm, if no treatments needed after 48 hours then discontinue using Per Protocol order mode.     If indication present, adjust the RT bronchodilator orders based on the Bronchodilator Assessment Score as indicated below.  Use Inhaler orders unless patient has one or more of the following: on home nebulizer, not able to hold breath for 10 seconds, is not alert and oriented, cannot activate and use MDI correctly, or respiratory rate 25 breaths per minute or more, then use the equivalent nebulizer order(s) with same Frequency and PRN reasons based on the score.  If a patient is on this medication at home then do not decrease Frequency below that used at home.    0-3 - enter or revise RT bronchodilator order(s) to equivalent RT Bronchodilator order with Frequency of every 4 hours PRN for wheezing or 
RT Inhaler-Nebulizer Bronchodilator Protocol Note    There is a bronchodilator order in the chart from a provider indicating to follow the RT Bronchodilator Protocol and there is an “Initiate RT Inhaler-Nebulizer Bronchodilator Protocol” order as well (see protocol at bottom of note).    CXR Findings:  No results found.    The findings from the last RT Protocol Assessment were as follows:   History Pulmonary Disease: Chronic pulmonary disease  Respiratory Pattern: Dyspnea on exertion or RR 21-25 bpm  Breath Sounds: Slightly diminished and/or crackles  Cough: Strong, spontaneous, non-productive  Indication for Bronchodilator Therapy: On home bronchodilators  Bronchodilator Assessment Score: 6    Aerosolized bronchodilator medication orders have been revised according to the RT Inhaler-Nebulizer Bronchodilator Protocol below.    Respiratory Therapist to perform RT Therapy Protocol Assessment initially then follow the protocol.  Repeat RT Therapy Protocol Assessment PRN for score 0-3 or on second treatment, BID, and PRN for scores above 3.    No Indications - adjust the frequency to every 6 hours PRN wheezing or bronchospasm, if no treatments needed after 48 hours then discontinue using Per Protocol order mode.     If indication present, adjust the RT bronchodilator orders based on the Bronchodilator Assessment Score as indicated below.  Use Inhaler orders unless patient has one or more of the following: on home nebulizer, not able to hold breath for 10 seconds, is not alert and oriented, cannot activate and use MDI correctly, or respiratory rate 25 breaths per minute or more, then use the equivalent nebulizer order(s) with same Frequency and PRN reasons based on the score.  If a patient is on this medication at home then do not decrease Frequency below that used at home.    0-3 - enter or revise RT bronchodilator order(s) to equivalent RT Bronchodilator order with Frequency of every 4 hours PRN for wheezing or 
RT Inhaler-Nebulizer Bronchodilator Protocol Note    There is a bronchodilator order in the chart from a provider indicating to follow the RT Bronchodilator Protocol and there is an “Initiate RT Inhaler-Nebulizer Bronchodilator Protocol” order as well (see protocol at bottom of note).    CXR Findings:  No results found.    The findings from the last RT Protocol Assessment were as follows:   History Pulmonary Disease: Chronic pulmonary disease  Respiratory Pattern: Mild dyspnea at rest, irregular pattern, or RR 21-25 bpm  Breath Sounds: Slightly diminished and/or crackles  Cough: Strong, spontaneous, non-productive  Indication for Bronchodilator Therapy: On home bronchodilators  Bronchodilator Assessment Score: 8    Aerosolized bronchodilator medication orders have been revised according to the RT Inhaler-Nebulizer Bronchodilator Protocol below.    Respiratory Therapist to perform RT Therapy Protocol Assessment initially then follow the protocol.  Repeat RT Therapy Protocol Assessment PRN for score 0-3 or on second treatment, BID, and PRN for scores above 3.    No Indications - adjust the frequency to every 6 hours PRN wheezing or bronchospasm, if no treatments needed after 48 hours then discontinue using Per Protocol order mode.     If indication present, adjust the RT bronchodilator orders based on the Bronchodilator Assessment Score as indicated below.  Use Inhaler orders unless patient has one or more of the following: on home nebulizer, not able to hold breath for 10 seconds, is not alert and oriented, cannot activate and use MDI correctly, or respiratory rate 25 breaths per minute or more, then use the equivalent nebulizer order(s) with same Frequency and PRN reasons based on the score.  If a patient is on this medication at home then do not decrease Frequency below that used at home.    0-3 - enter or revise RT bronchodilator order(s) to equivalent RT Bronchodilator order with Frequency of every 4 hours PRN 
RT Inhaler-Nebulizer Bronchodilator Protocol Note    There is a bronchodilator order in the chart from a provider indicating to follow the RT Bronchodilator Protocol and there is an “Initiate RT Inhaler-Nebulizer Bronchodilator Protocol” order as well (see protocol at bottom of note).    CXR Findings:  No results found.    The findings from the last RT Protocol Assessment were as follows:   History Pulmonary Disease: Chronic pulmonary disease  Respiratory Pattern: Mild dyspnea at rest, irregular pattern, or RR 21-25 bpm  Breath Sounds: Slightly diminished and/or crackles  Cough: Weak, productive  Indication for Bronchodilator Therapy: Decreased or absent breath sounds  Bronchodilator Assessment Score: 10    Aerosolized bronchodilator medication orders have been revised according to the RT Inhaler-Nebulizer Bronchodilator Protocol below.    Respiratory Therapist to perform RT Therapy Protocol Assessment initially then follow the protocol.  Repeat RT Therapy Protocol Assessment PRN for score 0-3 or on second treatment, BID, and PRN for scores above 3.    No Indications - adjust the frequency to every 6 hours PRN wheezing or bronchospasm, if no treatments needed after 48 hours then discontinue using Per Protocol order mode.     If indication present, adjust the RT bronchodilator orders based on the Bronchodilator Assessment Score as indicated below.  Use Inhaler orders unless patient has one or more of the following: on home nebulizer, not able to hold breath for 10 seconds, is not alert and oriented, cannot activate and use MDI correctly, or respiratory rate 25 breaths per minute or more, then use the equivalent nebulizer order(s) with same Frequency and PRN reasons based on the score.  If a patient is on this medication at home then do not decrease Frequency below that used at home.    0-3 - enter or revise RT bronchodilator order(s) to equivalent RT Bronchodilator order with Frequency of every 4 hours PRN for 
RT Inhaler-Nebulizer Bronchodilator Protocol Note    There is a bronchodilator order in the chart from a provider indicating to follow the RT Bronchodilator Protocol and there is an “Initiate RT Inhaler-Nebulizer Bronchodilator Protocol” order as well (see protocol at bottom of note).    CXR Findings:  No results found.    The findings from the last RT Protocol Assessment were as follows:   History Pulmonary Disease: Chronic pulmonary disease  Respiratory Pattern: Mild dyspnea at rest, irregular pattern, or RR 21-25 bpm  Breath Sounds: Slightly diminished and/or crackles  Cough: Weak, productive  Indication for Bronchodilator Therapy: Decreased or absent breath sounds  Bronchodilator Assessment Score: 10    Aerosolized bronchodilator medication orders have been revised according to the RT Inhaler-Nebulizer Bronchodilator Protocol below.    Respiratory Therapist to perform RT Therapy Protocol Assessment initially then follow the protocol.  Repeat RT Therapy Protocol Assessment PRN for score 0-3 or on second treatment, BID, and PRN for scores above 3.    No Indications - adjust the frequency to every 6 hours PRN wheezing or bronchospasm, if no treatments needed after 48 hours then discontinue using Per Protocol order mode.     If indication present, adjust the RT bronchodilator orders based on the Bronchodilator Assessment Score as indicated below.  Use Inhaler orders unless patient has one or more of the following: on home nebulizer, not able to hold breath for 10 seconds, is not alert and oriented, cannot activate and use MDI correctly, or respiratory rate 25 breaths per minute or more, then use the equivalent nebulizer order(s) with same Frequency and PRN reasons based on the score.  If a patient is on this medication at home then do not decrease Frequency below that used at home.    0-3 - enter or revise RT bronchodilator order(s) to equivalent RT Bronchodilator order with Frequency of every 4 hours PRN for 
RT Inhaler-Nebulizer Bronchodilator Protocol Note    There is a bronchodilator order in the chart from a provider indicating to follow the RT Bronchodilator Protocol and there is an “Initiate RT Inhaler-Nebulizer Bronchodilator Protocol” order as well (see protocol at bottom of note).    CXR Findings:  XR CHEST PORTABLE    Result Date: 1/10/2025  Diffuse interstitial prominence, mildly worsened. This could be seen with pulmonary edema or atypical/viral infection.       The findings from the last RT Protocol Assessment were as follows:   History Pulmonary Disease: (P) Chronic pulmonary disease  Respiratory Pattern: (P) Mild dyspnea at rest, irregular pattern, or RR 21-25 bpm  Breath Sounds: (P) Inspiratory and expiratory or bilateral wheezing and/or rhonchi  Cough: (P) Strong, spontaneous, non-productive  Indication for Bronchodilator Therapy: (P) Decreased or absent breath sounds, Wheezing associated with pulm disorder  Bronchodilator Assessment Score: (P) 12    Aerosolized bronchodilator medication orders have been revised according to the RT Inhaler-Nebulizer Bronchodilator Protocol below.    Respiratory Therapist to perform RT Therapy Protocol Assessment initially then follow the protocol.  Repeat RT Therapy Protocol Assessment PRN for score 0-3 or on second treatment, BID, and PRN for scores above 3.    No Indications - adjust the frequency to every 6 hours PRN wheezing or bronchospasm, if no treatments needed after 48 hours then discontinue using Per Protocol order mode.     If indication present, adjust the RT bronchodilator orders based on the Bronchodilator Assessment Score as indicated below.  Use Inhaler orders unless patient has one or more of the following: on home nebulizer, not able to hold breath for 10 seconds, is not alert and oriented, cannot activate and use MDI correctly, or respiratory rate 25 breaths per minute or more, then use the equivalent nebulizer order(s) with same Frequency and PRN 
RT Inhaler-Nebulizer Bronchodilator Protocol Note    There is a bronchodilator order in the chart from a provider indicating to follow the RT Bronchodilator Protocol and there is an “Initiate RT Inhaler-Nebulizer Bronchodilator Protocol” order as well (see protocol at bottom of note).    CXR Findings:  XR CHEST PORTABLE    Result Date: 1/10/2025  Diffuse interstitial prominence, mildly worsened. This could be seen with pulmonary edema or atypical/viral infection.     XR CHEST PORTABLE    Result Date: 1/10/2025  1. No significant change.       The findings from the last RT Protocol Assessment were as follows:   History Pulmonary Disease: (P) Chronic pulmonary disease  Respiratory Pattern: (P) Mild dyspnea at rest, irregular pattern, or RR 21-25 bpm  Breath Sounds: (P) Inspiratory and expiratory or bilateral wheezing and/or rhonchi  Cough: (P) Strong, spontaneous, non-productive  Indication for Bronchodilator Therapy: (P) Decreased or absent breath sounds  Bronchodilator Assessment Score: (P) 12    Aerosolized bronchodilator medication orders have been revised according to the RT Inhaler-Nebulizer Bronchodilator Protocol below.    Respiratory Therapist to perform RT Therapy Protocol Assessment initially then follow the protocol.  Repeat RT Therapy Protocol Assessment PRN for score 0-3 or on second treatment, BID, and PRN for scores above 3.    No Indications - adjust the frequency to every 6 hours PRN wheezing or bronchospasm, if no treatments needed after 48 hours then discontinue using Per Protocol order mode.     If indication present, adjust the RT bronchodilator orders based on the Bronchodilator Assessment Score as indicated below.  Use Inhaler orders unless patient has one or more of the following: on home nebulizer, not able to hold breath for 10 seconds, is not alert and oriented, cannot activate and use MDI correctly, or respiratory rate 25 breaths per minute or more, then use the equivalent nebulizer 
RT Inhaler-Nebulizer Bronchodilator Protocol Note    There is a bronchodilator order in the chart from a provider indicating to follow the RT Bronchodilator Protocol and there is an “Initiate RT Inhaler-Nebulizer Bronchodilator Protocol” order as well (see protocol at bottom of note).    CXR Findings:  XR CHEST PORTABLE    Result Date: 1/10/2025  Diffuse interstitial prominence, mildly worsened. This could be seen with pulmonary edema or atypical/viral infection.     XR CHEST PORTABLE    Result Date: 1/10/2025  1. No significant change.       The findings from the last RT Protocol Assessment were as follows:   History Pulmonary Disease: Chronic pulmonary disease  Respiratory Pattern: Dyspnea on exertion or RR 21-25 bpm  Breath Sounds: Inspiratory and expiratory or bilateral wheezing and/or rhonchi  Cough: Strong, spontaneous, non-productive  Indication for Bronchodilator Therapy: Decreased or absent breath sounds  Bronchodilator Assessment Score: 10    Aerosolized bronchodilator medication orders have been revised according to the RT Inhaler-Nebulizer Bronchodilator Protocol below.    Respiratory Therapist to perform RT Therapy Protocol Assessment initially then follow the protocol.  Repeat RT Therapy Protocol Assessment PRN for score 0-3 or on second treatment, BID, and PRN for scores above 3.    No Indications - adjust the frequency to every 6 hours PRN wheezing or bronchospasm, if no treatments needed after 48 hours then discontinue using Per Protocol order mode.     If indication present, adjust the RT bronchodilator orders based on the Bronchodilator Assessment Score as indicated below.  Use Inhaler orders unless patient has one or more of the following: on home nebulizer, not able to hold breath for 10 seconds, is not alert and oriented, cannot activate and use MDI correctly, or respiratory rate 25 breaths per minute or more, then use the equivalent nebulizer order(s) with same Frequency and PRN reasons based 
RT Inhaler-Nebulizer Bronchodilator Protocol Note    There is a bronchodilator order in the chart from a provider indicating to follow the RT Bronchodilator Protocol and there is an “Initiate RT Inhaler-Nebulizer Bronchodilator Protocol” order as well (see protocol at bottom of note).    CXR Findings:  XR CHEST PORTABLE    Result Date: 1/11/2025  Nasogastric tube with tip overlying gastric body.       The findings from the last RT Protocol Assessment were as follows:   History Pulmonary Disease: Chronic pulmonary disease  Respiratory Pattern: Mild dyspnea at rest, irregular pattern, or RR 21-25 bpm  Breath Sounds: Slightly diminished and/or crackles  Cough: Weak, productive  Indication for Bronchodilator Therapy: Decreased or absent breath sounds  Bronchodilator Assessment Score: 10    Aerosolized bronchodilator medication orders have been revised according to the RT Inhaler-Nebulizer Bronchodilator Protocol below.    Respiratory Therapist to perform RT Therapy Protocol Assessment initially then follow the protocol.  Repeat RT Therapy Protocol Assessment PRN for score 0-3 or on second treatment, BID, and PRN for scores above 3.    No Indications - adjust the frequency to every 6 hours PRN wheezing or bronchospasm, if no treatments needed after 48 hours then discontinue using Per Protocol order mode.     If indication present, adjust the RT bronchodilator orders based on the Bronchodilator Assessment Score as indicated below.  Use Inhaler orders unless patient has one or more of the following: on home nebulizer, not able to hold breath for 10 seconds, is not alert and oriented, cannot activate and use MDI correctly, or respiratory rate 25 breaths per minute or more, then use the equivalent nebulizer order(s) with same Frequency and PRN reasons based on the score.  If a patient is on this medication at home then do not decrease Frequency below that used at home.    0-3 - enter or revise RT bronchodilator order(s) to 
RT Inhaler-Nebulizer Bronchodilator Protocol Note    There is a bronchodilator order in the chart from a provider indicating to follow the RT Bronchodilator Protocol and there is an “Initiate RT Inhaler-Nebulizer Bronchodilator Protocol” order as well (see protocol at bottom of note).    CXR Findings:  XR CHEST PORTABLE    Result Date: 1/11/2025  Nasogastric tube with tip overlying gastric body.     XR CHEST PORTABLE    Result Date: 1/10/2025  1. No significant change.       The findings from the last RT Protocol Assessment were as follows:   History Pulmonary Disease: (P) Chronic pulmonary disease  Respiratory Pattern: (P) Use of accessory muscles, prolonged exhalation, or RR 26-30 bpm  Breath Sounds: (P) Slightly diminished and/or crackles  Cough: (P) Weak, productive  Indication for Bronchodilator Therapy: (P) Decreased or absent breath sounds  Bronchodilator Assessment Score: (P) 12    Aerosolized bronchodilator medication orders have been revised according to the RT Inhaler-Nebulizer Bronchodilator Protocol below.    Respiratory Therapist to perform RT Therapy Protocol Assessment initially then follow the protocol.  Repeat RT Therapy Protocol Assessment PRN for score 0-3 or on second treatment, BID, and PRN for scores above 3.    No Indications - adjust the frequency to every 6 hours PRN wheezing or bronchospasm, if no treatments needed after 48 hours then discontinue using Per Protocol order mode.     If indication present, adjust the RT bronchodilator orders based on the Bronchodilator Assessment Score as indicated below.  Use Inhaler orders unless patient has one or more of the following: on home nebulizer, not able to hold breath for 10 seconds, is not alert and oriented, cannot activate and use MDI correctly, or respiratory rate 25 breaths per minute or more, then use the equivalent nebulizer order(s) with same Frequency and PRN reasons based on the score.  If a patient is on this medication at home then 
RT Inhaler-Nebulizer Bronchodilator Protocol Note    There is a bronchodilator order in the chart from a provider indicating to follow the RT Bronchodilator Protocol and there is an “Initiate RT Inhaler-Nebulizer Bronchodilator Protocol” order as well (see protocol at bottom of note).    CXR Findings:  XR CHEST PORTABLE    Result Date: 1/14/2025  Chronic lung changes with increasing haziness in the right lower lobe concerning for developing pneumonia..       The findings from the last RT Protocol Assessment were as follows:   History Pulmonary Disease: Chronic pulmonary disease  Respiratory Pattern: Dyspnea on exertion or RR 21-25 bpm  Breath Sounds: Slightly diminished and/or crackles  Cough: Weak, non-productive  Indication for Bronchodilator Therapy: Decreased or absent breath sounds  Bronchodilator Assessment Score: 9    Aerosolized bronchodilator medication orders have been revised according to the RT Inhaler-Nebulizer Bronchodilator Protocol below.    Respiratory Therapist to perform RT Therapy Protocol Assessment initially then follow the protocol.  Repeat RT Therapy Protocol Assessment PRN for score 0-3 or on second treatment, BID, and PRN for scores above 3.    No Indications - adjust the frequency to every 6 hours PRN wheezing or bronchospasm, if no treatments needed after 48 hours then discontinue using Per Protocol order mode.     If indication present, adjust the RT bronchodilator orders based on the Bronchodilator Assessment Score as indicated below.  Use Inhaler orders unless patient has one or more of the following: on home nebulizer, not able to hold breath for 10 seconds, is not alert and oriented, cannot activate and use MDI correctly, or respiratory rate 25 breaths per minute or more, then use the equivalent nebulizer order(s) with same Frequency and PRN reasons based on the score.  If a patient is on this medication at home then do not decrease Frequency below that used at home.    0-3 - enter 
RT Inhaler-Nebulizer Bronchodilator Protocol Note    There is a bronchodilator order in the chart from a provider indicating to follow the RT Bronchodilator Protocol and there is an “Initiate RT Inhaler-Nebulizer Bronchodilator Protocol” order as well (see protocol at bottom of note).    CXR Findings:  XR CHEST PORTABLE    Result Date: 1/14/2025  Chronic lung changes with increasing haziness in the right lower lobe concerning for developing pneumonia..       The findings from the last RT Protocol Assessment were as follows:   History Pulmonary Disease: Chronic pulmonary disease  Respiratory Pattern: Dyspnea on exertion or RR 21-25 bpm  Breath Sounds: Slightly diminished and/or crackles  Cough: Weak, non-productive  Indication for Bronchodilator Therapy: Decreased or absent breath sounds  Bronchodilator Assessment Score: 9    Aerosolized bronchodilator medication orders have been revised according to the RT Inhaler-Nebulizer Bronchodilator Protocol below.    Respiratory Therapist to perform RT Therapy Protocol Assessment initially then follow the protocol.  Repeat RT Therapy Protocol Assessment PRN for score 0-3 or on second treatment, BID, and PRN for scores above 3.    No Indications - adjust the frequency to every 6 hours PRN wheezing or bronchospasm, if no treatments needed after 48 hours then discontinue using Per Protocol order mode.     If indication present, adjust the RT bronchodilator orders based on the Bronchodilator Assessment Score as indicated below.  Use Inhaler orders unless patient has one or more of the following: on home nebulizer, not able to hold breath for 10 seconds, is not alert and oriented, cannot activate and use MDI correctly, or respiratory rate 25 breaths per minute or more, then use the equivalent nebulizer order(s) with same Frequency and PRN reasons based on the score.  If a patient is on this medication at home then do not decrease Frequency below that used at home.    0-3 - enter 
RT Inhaler-Nebulizer Bronchodilator Protocol Note    There is a bronchodilator order in the chart from a provider indicating to follow the RT Bronchodilator Protocol and there is an “Initiate RT Inhaler-Nebulizer Bronchodilator Protocol” order as well (see protocol at bottom of note).    CXR Findings:  XR CHEST PORTABLE    Result Date: 1/14/2025  Chronic lung changes with increasing haziness in the right lower lobe concerning for developing pneumonia..       The findings from the last RT Protocol Assessment were as follows:   History Pulmonary Disease: Chronic pulmonary disease  Respiratory Pattern: Mild dyspnea at rest, irregular pattern, or RR 21-25 bpm  Breath Sounds: Slightly diminished and/or crackles  Cough: Unable to generate effective cough  Indication for Bronchodilator Therapy: Decreased or absent breath sounds  Bronchodilator Assessment Score: 12    Aerosolized bronchodilator medication orders have been revised according to the RT Inhaler-Nebulizer Bronchodilator Protocol below.    Respiratory Therapist to perform RT Therapy Protocol Assessment initially then follow the protocol.  Repeat RT Therapy Protocol Assessment PRN for score 0-3 or on second treatment, BID, and PRN for scores above 3.    No Indications - adjust the frequency to every 6 hours PRN wheezing or bronchospasm, if no treatments needed after 48 hours then discontinue using Per Protocol order mode.     If indication present, adjust the RT bronchodilator orders based on the Bronchodilator Assessment Score as indicated below.  Use Inhaler orders unless patient has one or more of the following: on home nebulizer, not able to hold breath for 10 seconds, is not alert and oriented, cannot activate and use MDI correctly, or respiratory rate 25 breaths per minute or more, then use the equivalent nebulizer order(s) with same Frequency and PRN reasons based on the score.  If a patient is on this medication at home then do not decrease Frequency below 
RT Inhaler-Nebulizer Bronchodilator Protocol Note    There is a bronchodilator order in the chart from a provider indicating to follow the RT Bronchodilator Protocol and there is an “Initiate RT Inhaler-Nebulizer Bronchodilator Protocol” order as well (see protocol at bottom of note).    CXR Findings:  XR CHEST PORTABLE    Result Date: 1/16/2025  No radiographic evidence of complication following central venous catheter placement.       The findings from the last RT Protocol Assessment were as follows:   History Pulmonary Disease: Chronic pulmonary disease  Respiratory Pattern: Mild dyspnea at rest, irregular pattern, or RR 21-25 bpm  Breath Sounds: Slightly diminished and/or crackles  Cough: Unable to generate effective cough  Indication for Bronchodilator Therapy: Decreased or absent breath sounds  Bronchodilator Assessment Score: 12    Aerosolized bronchodilator medication orders have been revised according to the RT Inhaler-Nebulizer Bronchodilator Protocol below.    Respiratory Therapist to perform RT Therapy Protocol Assessment initially then follow the protocol.  Repeat RT Therapy Protocol Assessment PRN for score 0-3 or on second treatment, BID, and PRN for scores above 3.    No Indications - adjust the frequency to every 6 hours PRN wheezing or bronchospasm, if no treatments needed after 48 hours then discontinue using Per Protocol order mode.     If indication present, adjust the RT bronchodilator orders based on the Bronchodilator Assessment Score as indicated below.  Use Inhaler orders unless patient has one or more of the following: on home nebulizer, not able to hold breath for 10 seconds, is not alert and oriented, cannot activate and use MDI correctly, or respiratory rate 25 breaths per minute or more, then use the equivalent nebulizer order(s) with same Frequency and PRN reasons based on the score.  If a patient is on this medication at home then do not decrease Frequency below that used at 
RT Inhaler-Nebulizer Bronchodilator Protocol Note    There is a bronchodilator order in the chart from a provider indicating to follow the RT Bronchodilator Protocol and there is an “Initiate RT Inhaler-Nebulizer Bronchodilator Protocol” order as well (see protocol at bottom of note).    CXR Findings:  XR CHEST PORTABLE    Result Date: 1/20/2025  Stable support apparatus.  No evidence of acute cardiopulmonary process.     XR CHEST PORTABLE    Result Date: 1/19/2025  1. Endotracheal tube tip is 4.4 cm above the brayden. 2. Enteric tube courses below the diaphragm. The proximal side port of the enteric tube is at the GE junction.  Consider advancement. 3. Small right pleural effusion with increasing airspace opacity in the right mid to lower lung.       The findings from the last RT Protocol Assessment were as follows:   History Pulmonary Disease: (P) Chronic pulmonary disease  Respiratory Pattern: (P) Dyspnea on exertion or RR 21-25 bpm  Breath Sounds: (P) Slightly diminished and/or crackles  Cough: (P) Weak, productive  Indication for Bronchodilator Therapy: (P) Decreased or absent breath sounds  Bronchodilator Assessment Score: (P) 8    Aerosolized bronchodilator medication orders have been revised according to the RT Inhaler-Nebulizer Bronchodilator Protocol below.    Respiratory Therapist to perform RT Therapy Protocol Assessment initially then follow the protocol.  Repeat RT Therapy Protocol Assessment PRN for score 0-3 or on second treatment, BID, and PRN for scores above 3.    No Indications - adjust the frequency to every 6 hours PRN wheezing or bronchospasm, if no treatments needed after 48 hours then discontinue using Per Protocol order mode.     If indication present, adjust the RT bronchodilator orders based on the Bronchodilator Assessment Score as indicated below.  Use Inhaler orders unless patient has one or more of the following: on home nebulizer, not able to hold breath for 10 seconds, is not alert 
RT Inhaler-Nebulizer Bronchodilator Protocol Note    There is a bronchodilator order in the chart from a provider indicating to follow the RT Bronchodilator Protocol and there is an “Initiate RT Inhaler-Nebulizer Bronchodilator Protocol” order as well (see protocol at bottom of note).    CXR Findings:  XR CHEST PORTABLE    Result Date: 1/21/2025  Overall similar appearing chest with no new infiltrate.     XR CHEST PORTABLE    Result Date: 1/20/2025  Stable support apparatus.  No evidence of acute cardiopulmonary process.       The findings from the last RT Protocol Assessment were as follows:   History Pulmonary Disease: Chronic pulmonary disease  Respiratory Pattern: Dyspnea on exertion or RR 21-25 bpm  Breath Sounds: Slightly diminished and/or crackles  Cough: Weak, productive  Indication for Bronchodilator Therapy: Decreased or absent breath sounds  Bronchodilator Assessment Score: 8    Aerosolized bronchodilator medication orders have been revised according to the RT Inhaler-Nebulizer Bronchodilator Protocol below.    Respiratory Therapist to perform RT Therapy Protocol Assessment initially then follow the protocol.  Repeat RT Therapy Protocol Assessment PRN for score 0-3 or on second treatment, BID, and PRN for scores above 3.    No Indications - adjust the frequency to every 6 hours PRN wheezing or bronchospasm, if no treatments needed after 48 hours then discontinue using Per Protocol order mode.     If indication present, adjust the RT bronchodilator orders based on the Bronchodilator Assessment Score as indicated below.  Use Inhaler orders unless patient has one or more of the following: on home nebulizer, not able to hold breath for 10 seconds, is not alert and oriented, cannot activate and use MDI correctly, or respiratory rate 25 breaths per minute or more, then use the equivalent nebulizer order(s) with same Frequency and PRN reasons based on the score.  If a patient is on this medication at home then 
RT Inhaler-Nebulizer Bronchodilator Protocol Note    There is a bronchodilator order in the chart from a provider indicating to follow the RT Bronchodilator Protocol and there is an “Initiate RT Inhaler-Nebulizer Bronchodilator Protocol” order as well (see protocol at bottom of note).    CXR Findings:  XR CHEST PORTABLE    Result Date: 1/22/2025  Stable chest without significant interval change.     XR CHEST PORTABLE    Result Date: 1/21/2025  Overall similar appearing chest with no new infiltrate.       The findings from the last RT Protocol Assessment were as follows:   History Pulmonary Disease: Chronic pulmonary disease  Respiratory Pattern: Dyspnea on exertion or RR 21-25 bpm  Breath Sounds: Slightly diminished and/or crackles  Cough: Weak, productive  Indication for Bronchodilator Therapy: Decreased or absent breath sounds  Bronchodilator Assessment Score: 8    Aerosolized bronchodilator medication orders have been revised according to the RT Inhaler-Nebulizer Bronchodilator Protocol below.    Respiratory Therapist to perform RT Therapy Protocol Assessment initially then follow the protocol.  Repeat RT Therapy Protocol Assessment PRN for score 0-3 or on second treatment, BID, and PRN for scores above 3.    No Indications - adjust the frequency to every 6 hours PRN wheezing or bronchospasm, if no treatments needed after 48 hours then discontinue using Per Protocol order mode.     If indication present, adjust the RT bronchodilator orders based on the Bronchodilator Assessment Score as indicated below.  Use Inhaler orders unless patient has one or more of the following: on home nebulizer, not able to hold breath for 10 seconds, is not alert and oriented, cannot activate and use MDI correctly, or respiratory rate 25 breaths per minute or more, then use the equivalent nebulizer order(s) with same Frequency and PRN reasons based on the score.  If a patient is on this medication at home then do not decrease 
RT Inhaler-Nebulizer Bronchodilator Protocol Note    There is a bronchodilator order in the chart from a provider indicating to follow the RT Bronchodilator Protocol and there is an “Initiate RT Inhaler-Nebulizer Bronchodilator Protocol” order as well (see protocol at bottom of note).    CXR Findings:  XR CHEST PORTABLE    Result Date: 1/23/2025  1. Low lying endotracheal tube consider retraction for more optimal positioning. 2. Bilateral costophrenic sulci are out of the field of view.  Otherwise stable appearance of the chest. The findings were sent to the [facility]iology [facility] at 6:35 am on 1/23/2025 to be communicated to a licensed caregiver.     XR CHEST PORTABLE    Result Date: 1/22/2025  Stable chest without significant interval change.       The findings from the last RT Protocol Assessment were as follows:   History Pulmonary Disease: (P) Chronic pulmonary disease  Respiratory Pattern: (P) Dyspnea on exertion or RR 21-25 bpm  Breath Sounds: (P) Slightly diminished and/or crackles  Cough: (P) Weak, non-productive  Indication for Bronchodilator Therapy: (P) Decreased or absent breath sounds  Bronchodilator Assessment Score: (P) 9    Aerosolized bronchodilator medication orders have been revised according to the RT Inhaler-Nebulizer Bronchodilator Protocol below.    Respiratory Therapist to perform RT Therapy Protocol Assessment initially then follow the protocol.  Repeat RT Therapy Protocol Assessment PRN for score 0-3 or on second treatment, BID, and PRN for scores above 3.    No Indications - adjust the frequency to every 6 hours PRN wheezing or bronchospasm, if no treatments needed after 48 hours then discontinue using Per Protocol order mode.     If indication present, adjust the RT bronchodilator orders based on the Bronchodilator Assessment Score as indicated below.  Use Inhaler orders unless patient has one or more of the following: on home nebulizer, not able to hold breath for 10 
RT Inhaler-Nebulizer Bronchodilator Protocol Note    There is a bronchodilator order in the chart from a provider indicating to follow the RT Bronchodilator Protocol and there is an “Initiate RT Inhaler-Nebulizer Bronchodilator Protocol” order as well (see protocol at bottom of note).    CXR Findings:  XR CHEST PORTABLE    Result Date: 1/23/2025  1. Low lying endotracheal tube consider retraction for more optimal positioning. 2. Bilateral costophrenic sulci are out of the field of view.  Otherwise stable appearance of the chest. The findings were sent to the [facility]iology [facility] at 6:35 am on 1/23/2025 to be communicated to a licensed caregiver.     XR CHEST PORTABLE    Result Date: 1/22/2025  Stable chest without significant interval change.       The findings from the last RT Protocol Assessment were as follows:   History Pulmonary Disease: Chronic pulmonary disease  Respiratory Pattern: Dyspnea on exertion or RR 21-25 bpm  Breath Sounds: Slightly diminished and/or crackles  Cough: Weak, non-productive  Indication for Bronchodilator Therapy: Decreased or absent breath sounds  Bronchodilator Assessment Score: 9    Aerosolized bronchodilator medication orders have been revised according to the RT Inhaler-Nebulizer Bronchodilator Protocol below.    Respiratory Therapist to perform RT Therapy Protocol Assessment initially then follow the protocol.  Repeat RT Therapy Protocol Assessment PRN for score 0-3 or on second treatment, BID, and PRN for scores above 3.    No Indications - adjust the frequency to every 6 hours PRN wheezing or bronchospasm, if no treatments needed after 48 hours then discontinue using Per Protocol order mode.     If indication present, adjust the RT bronchodilator orders based on the Bronchodilator Assessment Score as indicated below.  Use Inhaler orders unless patient has one or more of the following: on home nebulizer, not able to hold breath for 10 seconds, is not alert and 
RT Inhaler-Nebulizer Bronchodilator Protocol Note    There is a bronchodilator order in the chart from a provider indicating to follow the RT Bronchodilator Protocol and there is an “Initiate RT Inhaler-Nebulizer Bronchodilator Protocol” order as well (see protocol at bottom of note).    CXR Findings:  XR CHEST PORTABLE    Result Date: 1/24/2025  Bibasilar airspace opacities favored to represent atelectasis.       The findings from the last RT Protocol Assessment were as follows:   History Pulmonary Disease: (P) Chronic pulmonary disease  Respiratory Pattern: (P) Dyspnea on exertion or RR 21-25 bpm  Breath Sounds: (P) Slightly diminished and/or crackles  Cough: (P) Weak, non-productive  Indication for Bronchodilator Therapy: (P) Decreased or absent breath sounds  Bronchodilator Assessment Score: (P) 9    Aerosolized bronchodilator medication orders have been revised according to the RT Inhaler-Nebulizer Bronchodilator Protocol below.    Respiratory Therapist to perform RT Therapy Protocol Assessment initially then follow the protocol.  Repeat RT Therapy Protocol Assessment PRN for score 0-3 or on second treatment, BID, and PRN for scores above 3.    No Indications - adjust the frequency to every 6 hours PRN wheezing or bronchospasm, if no treatments needed after 48 hours then discontinue using Per Protocol order mode.     If indication present, adjust the RT bronchodilator orders based on the Bronchodilator Assessment Score as indicated below.  Use Inhaler orders unless patient has one or more of the following: on home nebulizer, not able to hold breath for 10 seconds, is not alert and oriented, cannot activate and use MDI correctly, or respiratory rate 25 breaths per minute or more, then use the equivalent nebulizer order(s) with same Frequency and PRN reasons based on the score.  If a patient is on this medication at home then do not decrease Frequency below that used at home.    0-3 - enter or revise RT 
RT Inhaler-Nebulizer Bronchodilator Protocol Note    There is a bronchodilator order in the chart from a provider indicating to follow the RT Bronchodilator Protocol and there is an “Initiate RT Inhaler-Nebulizer Bronchodilator Protocol” order as well (see protocol at bottom of note).    CXR Findings:  XR CHEST PORTABLE    Result Date: 1/24/2025  Bibasilar airspace opacities favored to represent atelectasis.       The findings from the last RT Protocol Assessment were as follows:   History Pulmonary Disease: (P) Chronic pulmonary disease  Respiratory Pattern: (P) Mild dyspnea at rest, irregular pattern, or RR 21-25 bpm  Breath Sounds: (P) Slightly diminished and/or crackles  Cough: (P) Weak, non-productive  Indication for Bronchodilator Therapy: (P) Decreased or absent breath sounds  Bronchodilator Assessment Score: (P) 11    Aerosolized bronchodilator medication orders have been revised according to the RT Inhaler-Nebulizer Bronchodilator Protocol below.    Respiratory Therapist to perform RT Therapy Protocol Assessment initially then follow the protocol.  Repeat RT Therapy Protocol Assessment PRN for score 0-3 or on second treatment, BID, and PRN for scores above 3.    No Indications - adjust the frequency to every 6 hours PRN wheezing or bronchospasm, if no treatments needed after 48 hours then discontinue using Per Protocol order mode.     If indication present, adjust the RT bronchodilator orders based on the Bronchodilator Assessment Score as indicated below.  Use Inhaler orders unless patient has one or more of the following: on home nebulizer, not able to hold breath for 10 seconds, is not alert and oriented, cannot activate and use MDI correctly, or respiratory rate 25 breaths per minute or more, then use the equivalent nebulizer order(s) with same Frequency and PRN reasons based on the score.  If a patient is on this medication at home then do not decrease Frequency below that used at home.    0-3 - enter 
RT Inhaler-Nebulizer Bronchodilator Protocol Note    There is a bronchodilator order in the chart from a provider indicating to follow the RT Bronchodilator Protocol and there is an “Initiate RT Inhaler-Nebulizer Bronchodilator Protocol” order as well (see protocol at bottom of note).    CXR Findings:  XR CHEST PORTABLE    Result Date: 1/24/2025  Bibasilar airspace opacities favored to represent atelectasis.       The findings from the last RT Protocol Assessment were as follows:   History Pulmonary Disease: Chronic pulmonary disease  Respiratory Pattern: Dyspnea on exertion or RR 21-25 bpm  Breath Sounds: Slightly diminished and/or crackles  Cough: Weak, non-productive  Indication for Bronchodilator Therapy: Decreased or absent breath sounds  Bronchodilator Assessment Score: 9    Aerosolized bronchodilator medication orders have been revised according to the RT Inhaler-Nebulizer Bronchodilator Protocol below.    Respiratory Therapist to perform RT Therapy Protocol Assessment initially then follow the protocol.  Repeat RT Therapy Protocol Assessment PRN for score 0-3 or on second treatment, BID, and PRN for scores above 3.    No Indications - adjust the frequency to every 6 hours PRN wheezing or bronchospasm, if no treatments needed after 48 hours then discontinue using Per Protocol order mode.     If indication present, adjust the RT bronchodilator orders based on the Bronchodilator Assessment Score as indicated below.  Use Inhaler orders unless patient has one or more of the following: on home nebulizer, not able to hold breath for 10 seconds, is not alert and oriented, cannot activate and use MDI correctly, or respiratory rate 25 breaths per minute or more, then use the equivalent nebulizer order(s) with same Frequency and PRN reasons based on the score.  If a patient is on this medication at home then do not decrease Frequency below that used at home.    0-3 - enter or revise RT bronchodilator order(s) to 
RT Inhaler-Nebulizer Bronchodilator Protocol Note    There is a bronchodilator order in the chart from a provider indicating to follow the RT Bronchodilator Protocol and there is an “Initiate RT Inhaler-Nebulizer Bronchodilator Protocol” order as well (see protocol at bottom of note).    CXR Findings:  XR CHEST PORTABLE    Result Date: 1/24/2025  Bibasilar airspace opacities favored to represent atelectasis.     XR CHEST PORTABLE    Result Date: 1/23/2025  1. Low lying endotracheal tube consider retraction for more optimal positioning. 2. Bilateral costophrenic sulci are out of the field of view.  Otherwise stable appearance of the chest. The findings were sent to the Radiology Results Communication Center at 6:35 am on 1/23/2025 to be communicated to a licensed caregiver.       The findings from the last RT Protocol Assessment were as follows:   History Pulmonary Disease: Chronic pulmonary disease  Respiratory Pattern: Dyspnea on exertion or RR 21-25 bpm  Breath Sounds: Slightly diminished and/or crackles  Cough: Weak, non-productive  Indication for Bronchodilator Therapy: Decreased or absent breath sounds  Bronchodilator Assessment Score: 9    Aerosolized bronchodilator medication orders have been revised according to the RT Inhaler-Nebulizer Bronchodilator Protocol below.    Respiratory Therapist to perform RT Therapy Protocol Assessment initially then follow the protocol.  Repeat RT Therapy Protocol Assessment PRN for score 0-3 or on second treatment, BID, and PRN for scores above 3.    No Indications - adjust the frequency to every 6 hours PRN wheezing or bronchospasm, if no treatments needed after 48 hours then discontinue using Per Protocol order mode.     If indication present, adjust the RT bronchodilator orders based on the Bronchodilator Assessment Score as indicated below.  Use Inhaler orders unless patient has one or more of the following: on home nebulizer, not able to hold breath for 10 seconds, is 
RT placed back on AC, restarted propofol.   
Reassessment complete.    Patient awake in bed on bipap. Physical assessment unchanged. Patient repositioned for comfort. Family continues at bedside.   
Reassessment complete.    Patient continues awake in bed. Patient remains on bipap. RT at bedside to attempt ABG draw, patient hitting this RN and RT and refusing to be stuck at this time. Physical assessment unchanged from previous. Patient repositioned for comfort. Patient's son in law at bedside.   
Reassessment complete.    Patient resting in bed with eyes closed on bipap. Patient wakes to new voice and is following commands at this time. Patient repositioned for comfort.   
Reassessment completed (see Flowsheet ) Pt alert,  PAP in place SPO2 100%  All ICU lines and monitoring  in place .    
Reassessment completed, see flowsheets, unchanged from prior. VSS.   Levophed 4 mcg/min, propofol  30 mcg/kg/min, NS 75 ml/hr     All ICU Lines and monitoring remain in place. Bed locked in lowest position. Call light within reach.   
Reassessment completed, see flowsheets, unchanged from prior. VSS.   Pt with 3 BMs, loose, brown. Rectal tube placed to help protect pt sacrum as it is red.     All ICU Lines and monitoring remain in place. Bed locked in lowest position. Call light within reach.     
Reassessment completed, see flowsheets, unchanged from prior. VSS. Continues to only have minimal response to painful stimuli. HOC meeting with pt children.     Levophed 6 mncg/min, precedex 1 mcg/kg/hr.     All ICU Lines and monitoring remain in place. Bed locked in lowest position. Call light within reach.     
Reassessment completed, see flowsheets.  VSS. NS 75 ml/hr Propofol 50 mcg/kg/min for PEG placement, titrated back down to 35 mcg/kg/min.   RUQ PEG, in place,  1.5 cm at the skin, with clean/dry/intact drsg.     All ICU Lines and monitoring remain in place. Bed locked in lowest position. Call light within reach.     
Rec'd report from Ignacia see flow sheet for full assessment  
Recd report form Marcela see flow sheet for full assessment  
Report called to 2 Carlos RNT[facility]. Procedure findings reported. RN aware that mepilexes were placed on patient for fragile skin. Right shin, coccyx, left and right wrists. Patient is currently in PACU. CMU updated on patient's location.   
Report form Ori  see flow sheet for full assessment,  Per report Tube feeds held due to cont. Bipap at this time  
Report from Demetrice see flow sheet for full assessment  
Report rec'd  from Ori see flow sheet for full assessment  
Report received from Brenda, says pt has fresh skin tears,bilat wrist and right shin mepilex applied. Cream and mepilex applied to sacrum. Wounds on sacrum and leg had already been documented  
Report to Demetrice transfer of care at this time  
Report to Eugenia transfer of care at this itime  
Report to Ori transfer of care at this time  
Report to Ori transfer of care at this time  
Report to Pansey transfer of care at this time  
Rounding completed with Dr. Huber and interdisciplinary team.  POC, labs, lines and assessment reviewed.        - Hold TF another day  -start IVF see MAR  - SBT tomorrow  
[facility] to meet with patient between   
Sbt 8/5, HR 74, RR 18, /59, RSBI 81, SpO2 98%.  
Shift assessment complete.    Patient seen awake in bed. Patient is alert and disoriented x 4. Patient intermittently following commands at this time. Patient seen on 3 lpm o2 per NC. Patient with no c/o pain. Patient with no s/s of immediate distress noted.    Physical assessment as charted in flow sheets.    Scheduled medications given per orders.    Central line dressing is clean, dry and intact with no signs of drainage. All tubing is current and dated. IPA caps applied to all access hubs.     PEG intact and secure, tube feed running at goal rate of 55 mL/hr with 100 q3h water flushes.    Bill intact and secure, clear yellow urine draining.    Patient repositioned for comfort. Patient's family at bedside.    
Shift assessment complete.    Patient seen awake in bed. Patient is alert to person only. Patient agitated and attempting to pull off bipap mask. Patient on bipap at 40%, managed by RT. Patient with no c/o pain. Patient with no s/s of distress noted.    Physical assessment as charted in flow sheets.    Scheduled medications given per orders.    Peripheral IV C/D/I and functioning properly.    Patient repositioned for comfort. Patient educated on use of call light and to call out with needs, will re-enforce. Call light within reach.   
Shift assessment complete.    Patient seen resting in bed with eyes closed. Patient with eye opening to voice. Patient following commands intermittently. Patient seen on bipap. Patient with no s/s of pain. Patient with no s/s of distress noted.    Physical assessment as charted in flow sheets.    Scheduled medications given per orders.    Central line dressing is clean, dry and intact with no signs of drainage. All tubing is current and dated. IPA caps applied to all access hubs.     PEG intact and secure, tube feed running at goal rate of 55 mL/hr with 100 q3h water flushes.    Bill intact and secure, clear yellow urine draining.    Patient repositioned for comfort. Patient's family at bedside.  
Shift assessment completed, see flow sheet.   RASS ***. Following commands.     Intubated and sedated on AC # *** ETT, at *** LL. *** / *** / *** %/ +5.   SpO2 ***%. Respirations are easy, even, and unlabored.   Bilateral lung sounds ***.     VSS  *** on the monitor  OG in place at ***, with TF at *** mL/hr, *** residual.      PICC/CVC/PIV, WNL with ***    All lines and monitoring devices in place. Bill is patent and secured.  Bilateral soft wrist restraints in place for patient safety.  Bed in lowest position with wheels locked. No needs expressed at this time. Will continue to monitor.    
Shift assessment completed, see flow sheet.   RASS 0. Following commands. Pt eyes opened upon entering.  Propofol held for SBT.     Intubated and sedated on AC # 7.5 ETT, at 23 LL. 20 / 350 / 30 %/ +5.   SpO2 96%. Respirations are easy, even, and unlabored.   Bilateral lung sounds diminished.     VSS  SR on the monitor  PEG in place  & clamped      CVC/PIV, WNL with levophed infusing at 3 mcg/min, propofol 30 mcg/kg/min, NS 75 ml/hr    All lines and monitoring devices in place. Bill is patent and secured.  Bilateral soft wrist restraints in place for patient safety.  Bed in lowest position with wheels locked. No needs expressed at this time. Will continue to monitor.    
Shift assessment completed, see flowsheets. Pt awake and following commands. Denies needs at this time.   
Shift assessment completed, see flowsheets. Pt given break from AVAPS and switched t 6L NC for 15min. Mouth swabbed. Back on AVAPS. Remains NPO.   
Shift assessment completed, see flowsheets. Pt with 30 min break from AVAPS, on 4L HFNC. Scheduled meds given via NG, see MAR.   
Shift assessment was completed (see flow sheet). Pt alert able to make needs know. SpO2 at 98%, diminished  lung sounds.   Heparin infusing per orders . TF Jevity at 55 ml/hr per orders.    Call light and bedside table within reach. Bed locked and in lowest position with bed alarm on.   Patient is not able to demonstrate the ability to move from a reclining position to an upright position within the recliner due to weak .    
Shift assessment, completed, see flow sheet. Pt RASS -3, not following commands, minimal response to pain.     , /66 (78), SpO2 92% bipap FiO2 40%. Respirations are easy, even, and unlabored. Bilateral lung sounds rhonchi/diminished.PEG in place, with TF at 55 ml/hr\. RIJ CVC, WNL with levophed 10 mcg/min, precedex 1 mcg/kg/min infusing.     Bill in place and patent with STAT lock.     Call light within reach. Bed in lowest position. Bed alarm on.     
Shift assessment, completed, see flow sheet. Pt is alert, RASS 0. Following commands.      Intubated and sedated with a # 7.5 ETT, at 23LL, on AC 20 / 350 /30 %/ 5. SR 75, /64 (83), SpO2 98%. Respirations are easy, even, and unlabored. Bilateral lung sounds diminished. L nare NGT in place at 57, clamped.      RIJ CVC, WNL with levophed 2 mcg/min, NS 75 ml/hr infusing, Propofol 30 mcg/kg/min     Bill in place and patent with STAT lock. Bilateral soft wrist restraints in place for pt safety.      Call light within reach. Bed in lowest position. Bed alarm on.       Family at bedside and continue with POC of PEG placement today, FULL code, and SBT in hopes of extubating patient.   
Shift assessment, completed, see flow sheet. Pt is alert, RASS 0. Following commands.     Intubated and sedated with a # 7.5 ETT, at 24 LL, was on AC 20 / 350 /30 %/ 5, currently SBT 5/5. SR 91, /53 (86), SpO2 96%. Respirations are easy, even, and unlabored. Bilateral lung sounds diminished. L nare NGT in place at 57, clamped.     RIJ CVC, WNL with levophed 2 mcg/min, NS 50 ml/hr infusing. Propofol was stopped for SBT, was at 30 mcg/kg/min  R femoral arterial line, WNL, leveled and zeroed.    Bill in place and patent with STAT lock. Bilateral soft wrist restraints in place for pt safety.     Call light within reach. Bed in lowest position. Bed alarm on.     
Spontaneous breathing trial started  ps 5/5  tv 349, rr 20, rsbi 62   pt is alert and follows commands   will monitor     
Switched back to assist control. Tolerated PS 5/5 30% well.   
This RN at bedside for central line placement with Dr. Wilkinson. Patient given break from bipap and placed on 6 lpm per HFNC.   
Transport here pt called to [facility] for ERCP.  
Updated Dr. PHELPS on sodium level 151, will start IVF. New order for BID BMP.   
VSS. Patient took a few pills and now spitting up. SLP called to see if they can evaluate her. She is back from procedure demanding coffee - RN explained to family she is npo and unable to swallow. Will monitor.   
Vanc Trough = 20.9. Msg sent to Pharmacy.   
Wasted Ativan 1.5mg with Yumiko Fowler RN.  Unable to waste in the [facility]. Spoke with Gerald in the pharm.  
Zofran 4mg ivp for c/o nausea.  
atorvastatin  20 mg Oral Nightly    budesonide  0.5 mg Nebulization BID RT    docusate sodium  100 mg Oral Daily    [Held by provider] apixaban  2.5 mg Oral BID    escitalopram  10 mg Oral Daily    [Held by provider] gabapentin  100 mg Oral TID    levothyroxine  25 mcg Oral Daily    magnesium oxide  400 mg Oral Daily    [Held by provider] metoprolol tartrate  12.5 mg Oral BID     Continuous Infusions:   norepinephrine 15 mcg/min (01/20/25 0603)    sodium chloride      sodium chloride      propofol 15 mcg/kg/min (01/20/25 0603)    sodium chloride      sodium chloride Stopped (01/15/25 1007)     Labs:  Recent Results (from the past 24 hour(s))   POCT Glucose    Collection Time: 01/25/25 11:45 AM   Result Value Ref Range    POC Glucose 199 (H) 70 - 99 mg/dl    Performed on ACCU-CHEK    Culture, Respiratory (with Gram Stain)    Collection Time: 01/25/25 11:59 AM    Specimen: BAL Quantitative Count   Result Value Ref Range    Gram Stain Result       Cytospin performed,no quantitation  Epithelial Cells  Ciliated Epithelial Cells  WBC's (Polymorphonuclear)  Gram positive cocci     POCT Glucose    Collection Time: 01/25/25  5:31 PM   Result Value Ref Range    POC Glucose 202 (H) 70 - 99 mg/dl    Performed on ACCU-CHEK    POCT Glucose    Collection Time: 01/25/25  7:54 PM   Result Value Ref Range    POC Glucose 185 (H) 70 - 99 mg/dl    Performed on ACCU-CHEK    CBC with Auto Differential    Collection Time: 01/26/25  4:26 AM   Result Value Ref Range    WBC 19.5 (H) 4.0 - 11.0 K/uL    RBC 3.15 (L) 4.00 - 5.20 M/uL    Hemoglobin 10.0 (L) 12.0 - 16.0 g/dL    Hematocrit 31.5 (L) 36.0 - 48.0 %    MCV 99.8 80.0 - 100.0 fL    MCH 31.8 26.0 - 34.0 pg    MCHC 31.8 31.0 - 36.0 g/dL    RDW 17.2 (H) 12.4 - 15.4 %    Platelets 118 (L) 135 - 450 K/uL    MPV 10.6 (H) 5.0 - 10.5 fL    Neutrophils % 92.7 %    Lymphocytes % 3.0 %    Monocytes % 3.8 %    Eosinophils % 0.2 %    Basophils % 0.3 %    Neutrophils Absolute 18.1 (H) 1.7 - 7.7 K/uL 
home.    0-3 - enter or revise RT bronchodilator order(s) to equivalent RT Bronchodilator order with Frequency of every 4 hours PRN for wheezing or increased work of breathing using Per Protocol order mode.        4-6 - enter or revise RT Bronchodilator order(s) to two equivalent RT bronchodilator orders with one order with BID Frequency and one order with Frequency of every 4 hours PRN wheezing or increased work of breathing using Per Protocol order mode.        7-10 - enter or revise RT Bronchodilator order(s) to two equivalent RT bronchodilator orders with one order with TID Frequency and one order with Frequency of every 4 hours PRN wheezing or increased work of breathing using Per Protocol order mode.       11-13 - enter or revise RT Bronchodilator order(s) to one equivalent RT bronchodilator order with QID Frequency and an Albuterol order with Frequency of every 4 hours PRN wheezing or increased work of breathing using Per Protocol order mode.      Greater than 13 - enter or revise RT Bronchodilator order(s) to one equivalent RT bronchodilator order with every 4 hours Frequency and an Albuterol order with Frequency of every 2 hours PRN wheezing or increased work of breathing using Per Protocol order mode.         Electronically signed by Mica Montes De Oca RCP on 1/17/2025 at 7:40 AM  
irregular  Abdomen: ND, soft, NT  Extremities: no edema        Assessment  85 yo female with pmx of COPD, CHF, A.fib, CKD, HTN, HLD, and CABG admitted for abdominal pain secondary to choledocholithiasis s/p ERCP. Hospital course c/b aspiration event requiring mechanical ventilation. Hospital course c/b acute blood loss secondary to retroperitoneal hematoma. Dysphagia likely secondary to acute CVA s/p PEG placement.      Plan  Continue supportive care  Continue TF via PEG  Advance rate to goal as tolerated   PPI BID  SLP following  Aspiration precautions  Will sign off, please call with questions     Susan Goldman, APRN - CNP  10:06 AM 1/24/2025                      84-year-old female with history of HTN, HLD, COPD, (O2 dependence, A-fib, CHF, CAD status post CABG, remote GI bleed admitted with COPD exacerbation and choledocholithiasis s/p ERCP with stone extraction. Hospital course complicated by large retroperitoneal hematoma and subacute CVA, dysphagia s/p EGD with PEG tube placement     Continue supportive care. PPI BID. Start TFs per PEG. Keep HOB elevated during use. Will sign off. Call with questions.    Celestine Lester MD          (O) [phone]  (O) [phone]   
levothyroxine  25 mcg Oral Daily    magnesium oxide  400 mg Oral Daily    [Held by provider] metoprolol tartrate  12.5 mg Oral BID     HOME MEDICATIONS:  Prior to Admission medications    Medication Sig Start Date End Date Taking? Authorizing Provider   meclizine (ANTIVERT) 12.5 MG tablet Take 1 tablet by mouth 3 times daily as needed for Dizziness 1/8/25 2/7/25 Yes Popeye Madison MD   predniSONE (DELTASONE) 10 MG tablet 4 tabs for 3 days 3 tabs for 3 days 2 tabs for 3 days 1 tabs for 3 days 1/8/25  Yes Popeye Madison MD   busPIRone (BUSPAR) 15 MG tablet Take 15 mg by mouth 2 times daily Indications: Feeling Anxious   Yes Charmaine Gant MD   gabapentin (NEURONTIN) 100 MG capsule Take 1 capsule by mouth 3 times daily.   Yes Charmaine Gant MD   guaiFENesin (MUCINEX) 600 MG extended release tablet Take 2 tablets by mouth 2 times daily   Yes Charmaine Gant MD   magnesium oxide (MAG-OX) 400 (240 Mg) MG tablet Take 1 tablet by mouth daily   Yes Charmaine Gant MD   furosemide (LASIX) 20 MG tablet Take 1 tablet by mouth daily 12/6/24  Yes Martina Kumar MD   metoprolol tartrate (LOPRESSOR) 25 MG tablet Take 0.5 tablets by mouth 2 times daily 12/6/24  Yes Martina Kumar MD   budesonide (PULMICORT) 0.5 MG/2ML nebulizer suspension Take 2 mLs by nebulization in the morning and 2 mLs in the evening. 11/25/24  Yes Mena Calzada MD   escitalopram (LEXAPRO) 10 MG tablet Take 1 tablet by mouth daily 11/26/24  Yes Mena Calzada MD   levothyroxine (SYNTHROID) 25 MCG tablet Take 1 tablet by mouth Daily for 120 doses 11/26/24 3/26/25 Yes Mena Calzada MD   pantoprazole (PROTONIX) 40 MG tablet Take 1 tablet by mouth in the morning and at bedtime 11/25/24  Yes Mena Calzada MD   ferrous sulfate (IRON 325) 325 (65 Fe) MG tablet Take 1 tablet by mouth daily (with breakfast)   Yes Charmaine Gant MD   docusate sodium (COLACE, DULCOLAX) 100 MG CAPS Take 100 mg by 
mild gallbladder wall thickening and pericholecystic   edema.  Sonographic correlation recommended to assess for early acute   cholecystitis.      2.  Choledocholithiasis with CBD dilatation.      3.  New linear metallic density noted in the distal esophageal lumen near the   GE junction, possibly an ingested foreign body or related to prior surgery.   No perforation appreciated.      4. Two new punctate right lower lobe pulmonary nodules, likely infectious or   inflammatory in nature.  COPD.  CT chest follow-up recommended in 3-6 months   to document resolution/stability.      5.  No acute lumbar spine fracture.      6.  Additional findings, as above.         CT ABDOMEN PELVIS W IV CONTRAST Additional Contrast? None   Final Result   1.  Cholelithiasis with mild gallbladder wall thickening and pericholecystic   edema.  Sonographic correlation recommended to assess for early acute   cholecystitis.      2.  Choledocholithiasis with CBD dilatation.      3.  New linear metallic density noted in the distal esophageal lumen near the   GE junction, possibly an ingested foreign body or related to prior surgery.   No perforation appreciated.      4. Two new punctate right lower lobe pulmonary nodules, likely infectious or   inflammatory in nature.  COPD.  CT chest follow-up recommended in 3-6 months   to document resolution/stability.      5.  No acute lumbar spine fracture.      6.  Additional findings, as above.               ASSESSMENT:  Terri Smith is a 84 y.o. female with a pmhx of afib, COPD, chronic resp failure, dCHF who presented with tailbone pain for weeks. Additionally c/o abdominal pain and nausea.     CT showed CBD stone and GB thickening / inflammation.    Choledocholithiasis   Cholecystitis   Anticoagulated with Eliquis for Afib - on hold     Patient concerned for dyspnea this morning - needing O2 supplementation higher than baseline  Afebrile  No leukocytosis  Liver profile benign      PLAN:  GI 
or increased work of breathing using Per Protocol order mode.        4-6 - enter or revise RT Bronchodilator order(s) to two equivalent RT bronchodilator orders with one order with BID Frequency and one order with Frequency of every 4 hours PRN wheezing or increased work of breathing using Per Protocol order mode.        7-10 - enter or revise RT Bronchodilator order(s) to two equivalent RT bronchodilator orders with one order with TID Frequency and one order with Frequency of every 4 hours PRN wheezing or increased work of breathing using Per Protocol order mode.       11-13 - enter or revise RT Bronchodilator order(s) to one equivalent RT bronchodilator order with QID Frequency and an Albuterol order with Frequency of every 4 hours PRN wheezing or increased work of breathing using Per Protocol order mode.      Greater than 13 - enter or revise RT Bronchodilator order(s) to one equivalent RT bronchodilator order with every 4 hours Frequency and an Albuterol order with Frequency of every 2 hours PRN wheezing or increased work of breathing using Per Protocol order mode.     Electronically signed by Brandin Barron RCP on 1/6/2025 at 9:45 PM  
  Start vanc and Cefepime. Completed Zosyn D#7/7  Repeat Cx   IVF  cc bolus PRN target SBP>90 up to 4 liters  Check lactic acid  BMP every 6 hrs   3 units of packed RBCs  Monitory H&H and transfuse for Hb < 7 - 2 units PRBCs  IV Levophed to keep MAP > 65 mmHg or SBP>90  Stat head CT, CT abdomen and pelvis with p.o. contrast  GI following  Blood sugar control ISS, with goal 150-180  GI prophylaxis: PPI BID   DVT prophylaxis: SCDs and holding Heparin drip/Eliquis   MRSA prophylaxis: Bactroban  Full code   I discussed care plan with daughter at the bedside and multiple good questions were answered.                This patient is critically ill. I spent 40 minutes directly engaged in the delivery of critical care to this patient, which was directed towards the patient's critical illness as listed above. This included direct patient contact, management of life support systems review of data (i.e.: imaging and lab), discussion with team members, and this time excludes time spent on procedures.              
01/05/2025 06:27 AM    EOSABS 0.0 01/05/2025 06:27 AM    BASOSABS 0.0 01/05/2025 06:27 AM    DIFFTYPE Auto 05/08/2013 06:09 AM     CMP:    Lab Results   Component Value Date/Time     01/05/2025 06:27 AM    K 4.0 01/05/2025 06:27 AM     01/05/2025 06:27 AM    CO2 32 01/05/2025 06:27 AM    BUN 9 01/05/2025 06:27 AM    CREATININE 1.0 01/05/2025 06:27 AM    GFRAA 58 04/09/2022 05:15 AM    GFRAA >60 05/08/2013 06:09 AM    AGRATIO 0.8 01/05/2025 06:27 AM    LABGLOM 56 01/05/2025 06:27 AM    LABGLOM 50 02/27/2024 11:12 AM    LABGLOM 36 09/14/2023 12:00 AM    GLUCOSE 83 01/05/2025 06:27 AM    CALCIUM 8.7 01/05/2025 06:27 AM    BILITOT <0.2 01/05/2025 06:27 AM    ALKPHOS 86 01/05/2025 06:27 AM    AST 21 01/05/2025 06:27 AM    ALT 8 01/05/2025 06:27 AM     CT: 1.  Cholelithiasis with mild gallbladder wall thickening and pericholecystic  edema.  Sonographic correlation recommended to assess for early acute  cholecystitis.     2.  Choledocholithiasis with CBD dilatation.     3.  New linear metallic density noted in the distal esophageal lumen near the  GE junction, possibly an ingested foreign body or related to prior surgery.  No perforation appreciated.     4. Two new punctate right lower lobe pulmonary nodules, likely infectious or  inflammatory in nature.  COPD.  CT chest follow-up recommended in 3-6 months  to document resolution/stability.     5.  No acute lumbar spine fracture.      Assessment:  84-year-old female with past medical history of HTN, COPD, chronic O2 dependence, CAD s/p CABG 2018, CHF, CKD, DM2, A-fib on Eliquis, GERD, dysphagia and GIB s/p EGD and colonoscopy (12/18) with findings of a nonbleeding visible vessel at the GEJ for which an Endo Clip was applied, esophageal dilation of schatzki ring with 60 Fr vasquez dilator and a small pre cancerous colon polyp who presents to [facility] from a local nursing home with vomiting and abdominal pain rleated to CBD sones and acute cholecystitis.  Sxs somewhat 
PROFILE:   No results for input(s): \"AST\", \"ALT\", \"LIPASE\", \"AMYLASE\", \"BILIDIR\", \"BILITOT\", \"ALKPHOS\" in the last 72 hours.    Invalid input(s): \"ALB\"    PT/INR:   No results for input(s): \"PROTIME\", \"INR\" in the last 72 hours.      CULTURES  Results       Procedure Component Value Units Date/Time    Culture, Blood 1 [[id number]] Collected: 01/15/25 1054    Order Status: Completed Specimen: Blood Updated: 01/16/25 1115     Blood Culture, Routine No Growth to date.  Any change in status will be called.    Narrative:      ORDER#: [id number]                          ORDERED BY: BUTCH STACK  SOURCE: Blood Antecubital-Rig              COLLECTED:  01/15/25 10:54  ANTIBIOTICS AT YURIY.:                      RECEIVED :  01/15/25 16:32  If child <=2 yrs old please draw pediatric bottle.~Blood Culture 1    Culture, Blood 1 [[id number]] Collected: 01/15/25 1054    Order Status: Completed Specimen: Blood Updated: 01/16/25 1115     Blood Culture, Routine No Growth to date.  Any change in status will be called.    Narrative:      ORDER#: [id number]                          ORDERED BY: BUTCH STACK  SOURCE: Blood Antecubital-Rig              COLLECTED:  01/15/25 10:54  ANTIBIOTICS AT YURIY.:                      RECEIVED :  01/15/25 16:32  If child <=2 yrs old please draw pediatric bottle.~Blood Culture 1    Culture, Respiratory [[id number]]     Order Status: Sent Specimen: Sputum Expectorated     COVID-19 & Influenza Combo [[id number]] Collected: 01/04/25 0040    Order Status: Completed Specimen: Nasopharyngeal Swab Updated: 01/04/25 0110     SARS-CoV-2 RNA, RT PCR NOT DETECTED     Comment: Not Detected results do not preclude SARS-CoV-2 infection and  should not be used as the sole basis for patient management  decisions.  Results must be combined with clinical observations,  patient history, and epidemiological information.  Testing was performed using ART DEEP SARS-CoV-2 and Influenza A/B  nucleic acid assay. This test is a 
failure   Hemodynamic shock-hemorrhagic and probable sepsis  Acute blood loss anemia ? GIB ? 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 DDx granuloma, inflammatory, scarring, malignancy      PLAN:  Intubation and mechanical ventilation  Propofol drip with Versed/fentanyl as needed  Precedex if needed   Inhaled bronchodilator therapy  Prednisone Po   Cefepime,flagyl day 3  ,will deescalate soon . Completed Zosyn D#7/7  Repeat Cx ,will hold vancomycin ,will change PO Augmentin   Lactic 2  On fluid 75 by renal   Neurology following for possible stroke   BMP every 6 hrs   Reviewed MRI bilateral stroke   3 units of packed RBCs,watch Hb 7.3 transfuse if less than 7.watch hb  Monitory H&H and transfuse for Hb < 7 - 2 units PRBCs  Subacute infarct CT head CT abdomen and pelvis with p.o. contrast shows hematoma   GI following  Blood sugar control ISS, with goal 150-180  GI prophylaxis: PPI BID   DVT prophylaxis: SCDs and holding Heparin drip/Eliquis   MRSA prophylaxis: Bactroban  Full code   Consider bronch   Renal on ,US mad BMP   Care reviewed with nursing staff, medical and surgical specialty care, primary care and the patient's family as available.   CXR in am   Possible extubation in am to do SBT   Chart review/lab review/X-ray viewing/documentation and had long Conversation with patient/family re: prognosis, care options and any end of life issues:      Critical care time spent reviewing labs/films, examining patient, collaborating with other physicians more than 35  Minutes  excluding procedures ..    Payton Huber M.D.   1/22/2025  11:41 AM      
                   ORDERED BY: BUTCH STACK  SOURCE: Nares                              COLLECTED:  01/20/25 12:14  ANTIBIOTICS AT YURIY.:                      RECEIVED :  01/20/25 12:18  CALL  [facility] tel. [phone],  Microbiology results called to and read back by SUJIT Palacio, 01/20/2025  21:53, by TYMOL    Culture, Respiratory (with Gram Stain) [[id number]]     Order Status: Sent Specimen: Sputum Expectorated     Culture, Blood 1 [[id number]] Collected: 01/15/25 1054    Order Status: Completed Specimen: Blood Updated: 01/19/25 1115     Blood Culture, Routine No Growth after 4 days of incubation.    Narrative:      ORDER#: [id number]                          ORDERED BY: BUTCH STACK  SOURCE: Blood Antecubital-Rig              COLLECTED:  01/15/25 10:54  ANTIBIOTICS AT YURIY.:                      RECEIVED :  01/15/25 16:32  If child <=2 yrs old please draw pediatric bottle.~Blood Culture 1    Culture, Blood 1 [[id number]] Collected: 01/15/25 1054    Order Status: Completed Specimen: Blood Updated: 01/19/25 1115     Blood Culture, Routine No Growth after 4 days of incubation.    Narrative:      ORDER#: [id number]                          ORDERED BY: BUTCH STACK  SOURCE: Blood Antecubital-Rig              COLLECTED:  01/15/25 10:54  ANTIBIOTICS AT YURIY.:                      RECEIVED :  01/15/25 16:32  If child <=2 yrs old please draw pediatric bottle.~Blood Culture 1    Culture, Respiratory [[id number]] Collected: 01/15/25 0000    Order Status: Canceled Specimen: Sputum Expectorated                RADIOLOGY  XR CHEST PORTABLE   Final Result   Severe emphysema. No acute findings with improved aeration of the lungs   compared to prior exam.         XR CHEST PORTABLE   Final Result   Bibasilar airspace opacities favored to represent atelectasis.         XR CHEST PORTABLE   Final Result   1. Low lying endotracheal tube consider retraction for more optimal   positioning.   2. Bilateral costophrenic sulci are out 
Status: Sent Specimen: Sputum Expectorated     Culture, Blood 1 [[id number]] Collected: 01/15/25 1054    Order Status: Completed Specimen: Blood Updated: 01/19/25 1115     Blood Culture, Routine No Growth after 4 days of incubation.    Narrative:      ORDER#: [id number]                          ORDERED BY: BUTCH STACK  SOURCE: Blood Antecubital-Rig              COLLECTED:  01/15/25 10:54  ANTIBIOTICS AT YURIY.:                      RECEIVED :  01/15/25 16:32  If child <=2 yrs old please draw pediatric bottle.~Blood Culture 1    Culture, Blood 1 [[id number]] Collected: 01/15/25 1054    Order Status: Completed Specimen: Blood Updated: 01/19/25 1115     Blood Culture, Routine No Growth after 4 days of incubation.    Narrative:      ORDER#: [id number]                          ORDERED BY: BUTCH STACK  SOURCE: Blood Antecubital-Rig              COLLECTED:  01/15/25 10:54  ANTIBIOTICS AT YURIY.:                      RECEIVED :  01/15/25 16:32  If child <=2 yrs old please draw pediatric bottle.~Blood Culture 1    Culture, Respiratory [[id number]]     Order Status: Sent Specimen: Sputum Expectorated                RADIOLOGY  XR CHEST PORTABLE   Final Result   Overall similar appearing chest with no new infiltrate.         Vascular duplex carotid bilateral   Final Result      XR CHEST PORTABLE   Final Result   Stable support apparatus.  No evidence of acute cardiopulmonary process.         CT ABDOMEN PELVIS WO CONTRAST Additional Contrast? Oral   Final Result   1. Large left retroperitoneal hematoma measuring up to 10 x 7 x 17 cm with a   fluid fluid level suggestive of more acute blood products in the dependent   aspect.  There is asymmetric enlargement of the left iliopsoas muscle   suggestive of underlying hematoma.  There is also high-density material in   the left subphrenic region suspicious for hematoma involving the left   diaphragm.  There is associated perisplenic fluid and stranding and an   underlying splenic injury 
ambulation, stair negotiation, sitting tolerance, standing tolerance, and places the patient at an increased risk of falling. Today's treatment consisted of bed mobility, transfers training, ambulation, activities to challenge sitting balance/tolerance, and activities to challenge standing balance/tolerance in order to address the above impairments and functional limitations. Continued skilled physical therapy is necessary to address the above impairments, in order to help the patient make a full return to OF without complaints of pain, difficulty or compensation.     Recommending SNF upon discharge as patient functioning below baseline, demonstrates good rehab potential and unable to return home due to inability to negotiate stairs to enter home/bedroom/bathroom, burden of care beyond caregiver ability, home environment not conducive to patient recovery, and limited safety awareness.    Goals :   To be met in 3 visits:  1). Independent with LE Ex x 10 reps  2). Sit to/from stand: CGA  3). Bed to chair: CGA  4). CHF goal: N/A    To be met in 6 visits:  1).  Supine to/from sit: SBA  2).  Sit to/from stand: SBA  3).  Bed to chair: SBA  4).  Gait: Ambulate 50 ft.  with CGA and use of gait belt and rolling walker (RW)  5).  Tolerate B LE exercises 3 sets of 10-15 reps  6).  Ascend/descend 2 steps with CGA with use of hand rail bilateral and gait belt and rolling walker (RW)    Rehabilitation Potential: Good  Strengths for achieving goals include:   PLOF, Family Support, and Pt cooperative   Barriers to achieving goals include:    Complexity of condition, Chronicity of condition, Pain, and Weakness    Plan    To be seen 3-5 x/ week while in acute care setting for therapeutic exercises/activities, bed mobility training, functional transfer training, balance training, neuromuscular reeducation , gait training, and stair training.    Signature: Constantine Cassidy, PT     If patient discharges from this facility prior to next 
of Care Reviewed: Yes   Patient and/or Family's stated Goal of Care this Admission: Reduce shortness of breath, increase activity tolerance, better understand heart failure and disease management, be more comfortable, and reduce lower extremity edema prior to discharge.     Electronically signed by Leila Javier RN on 1/17/2025 at 7:57 AM   
ingested foreign body or related to prior surgery.   No perforation appreciated.      4. Two new punctate right lower lobe pulmonary nodules, likely infectious or   inflammatory in nature.  COPD.  CT chest follow-up recommended in 3-6 months   to document resolution/stability.      5.  No acute lumbar spine fracture.      6.  Additional findings, as above.         FL MODIFIED BARIUM SWALLOW W VIDEO    (Results Pending)         Assessment/Plan:       #Acute on Chronic hypoxic respiratory failure   -on 2-3 L O2 at baseline , has AVAPS at home   - worsened resp status initially with copd exacerbation and later complicated with Aspiration   -She did not tolerate her home AVAPS unit , switched to the hospital BiPAP unit and she is tolerating it better.  She is back on BiPAP.    -Steroids and Mucinex added   - stop oral augmentin, started zosyn  - given iv lasix x 1 - held for hypernatremia   - resumed home AVAPS ,  - very limited prognosis aware by family, remains full code   On AVAPS.  Daughter wants continued care including intubation if necessary.  Prognosis is poor.  Consideration for PEG tube.  Respiratory status not stable yet.      #Atrial fibrillation   #Secondary hypercoagulable state  -Was on Eliquis. This was held as he had one melanotic stool.  No further issues. Will start heparin and monitor H and H.  resumed BB     #Choledocholithiasis with CBD dilatation   - ERCP done with sphincterotomy and stone removal  - however course complicated with development of resp failure with aspiration , see below   - LFT stable. GI and surgery f.w         #Normocytic anemia   #Recent EGD with clip placement 12/16/24  #Esophageal foreign body- noted on scan but could be clip from recent procedure   -GI consulted   - Hb is low. She got a unit of blood on 1/13/2025  -monitor CBC   -Has received as needed transfusions        # Chronic diastolic HF  -last echocardiogram with EF 60-65%  -on lasix - holding at this time      # 
     XR CHEST PORTABLE   Final Result   1. No significant change.         XR CHEST PORTABLE   Final Result   Diffuse interstitial prominence, mildly worsened. This could be seen with   pulmonary edema or atypical/viral infection.         FL ESOPHAGRAM   Final Result   Significantly limited exam by patient conditioning.  There was no obstruction   of contrast from the esophagus into the stomach.      Aspiration of swallowed barium prompted immediate termination of the   examination.      RECOMMENDATIONS:   Speech pathology consultation.         XR CHEST (2 VW)   Final Result   Increasing bronchitis/bronchiolitis with no evidence of lobar pneumonia.      Possible small right pleural effusion.         FL ERCP PANCREAS ONLY S&I   Final Result   See above.  Please refer to the procedure report for further details.         CT LUMBAR SPINE BONY RECONSTRUCTION   Final Result   1.  Cholelithiasis with mild gallbladder wall thickening and pericholecystic   edema.  Sonographic correlation recommended to assess for early acute   cholecystitis.      2.  Choledocholithiasis with CBD dilatation.      3.  New linear metallic density noted in the distal esophageal lumen near the   GE junction, possibly an ingested foreign body or related to prior surgery.   No perforation appreciated.      4. Two new punctate right lower lobe pulmonary nodules, likely infectious or   inflammatory in nature.  COPD.  CT chest follow-up recommended in 3-6 months   to document resolution/stability.      5.  No acute lumbar spine fracture.      6.  Additional findings, as above.         CT ABDOMEN PELVIS W IV CONTRAST Additional Contrast? None   Final Result   1.  Cholelithiasis with mild gallbladder wall thickening and pericholecystic   edema.  Sonographic correlation recommended to assess for early acute   cholecystitis.      2.  Choledocholithiasis with CBD dilatation.      3.  New linear metallic density noted in the distal esophageal lumen near the 
feeding.  Could try clear liquids if she is able to tolerate it.  Having generalized weakness.    - EGD in December as noted below  - PEG place on 1/22/25.    #Choledocholithiasis with CBD dilatation   - ERCP done with sphincterotomy and stone removal  - however course complicated with development of resp failure with aspiration , see below   - LFT stable. GI and surgery f.w      #Normocytic anemia   #Recent EGD with clip placement 12/16/24  #Esophageal foreign body- noted on scan but could be clip from recent procedure   -GI consulted   - Hb is low. She got a unit of blood on 1/13/2025  -monitor CBC   -Has received as needed transfusions  - multiple transfusions given.     # Chronic diastolic HF  -last echocardiogram with EF 60-65%  --Receiving IV Lasix - hold    # paroxysmal Afib - on low dose Metoprolol, low dose ELiquis  Rate controlled    #NADJA on CKD   -Cr around baseline   -sudden rise in Cr 1/19, found to have acute bleed as above  -Nephrology consulted  -IVF and blood products given    # hypernatremia - likely free water deficit  - monitor with D 5 fluids      #Hypothyroidism   -on synthroid       #New pulmonary nodules   - follows with pulmonary outpatient     #RUT- on AVAPS at home      #Anxiety   #Depression  -on buspar, lexapro- holding  -on ativan prn      DVT Prophylaxis: on heparin drip - stopped 1/19  Diet: Diet NPO  ADULT TUBE FEEDING; Nasogastric; Standard with Fiber; Continuous; 15; Yes; 15; Q 4 hours; 55; 100; Q 3 hours  Code Status: Full Code    This patient carries an extremely poor prognosis.    New RP bleed, found to have likely subacute CVA as well  Heparin drip stopped. Explained to daughter. Full code for now. PEG today    EDWARD CASE MD, 1/22/2025 1:38 PM

## 2025-01-27 NOTE — DISCHARGE SUMMARY
Name:  Terri Smith  Room:  3011/3011-01  MRN:    [id number]    Discharge Summary      This discharge summary is in conjunction with a complete physical exam done on the day of discharge.      Discharging Physician: LORENZO MCFARLAND MD      Admit: 1/3/2025  Discharge:  1/27/2025     Diagnoses this Admission    Principal Problem:    Cholecystitis  Active Problems:    Acute kidney injury superimposed on CKD (HCC)    Severe protein-calorie malnutrition (HCC)    Chronic hypoxic respiratory failure    Chronic diastolic congestive heart failure (HCC)    Normocytic anemia    Choledocholithiasis with acute cholecystitis    Hyperkalemia    Nausea and vomiting    Pulmonary nodule    Chronic hypoxemic respiratory failure    Dysphagia    Black tarry stools    Disorder of electrolytes    Acute hypoxic on chronic hypercapnic respiratory failure    Acute blood loss anemia    Acute encephalopathy    Retroperitoneal hematoma    Abnormal head CT    History of TIA (transient ischemic attack)    Cerebrovascular accident (CVA) due to embolism of left posterior cerebral artery (HCC)  Resolved Problems:    * No resolved hospital problems. *          Procedures (Please Review Full Report for Details)      Consults    IP CONSULT TO GI  IP CONSULT TO GENERAL SURGERY  IP CONSULT TO PULMONOLOGY  IP CONSULT TO VASCULAR ACCESS TEAM  IP CONSULT TO VASCULAR ACCESS TEAM  IP CONSULT TO NEPHROLOGY  IP CONSULT TO DIETITIAN  IP CONSULT TO PALLIATIVE CARE  IP CONSULT TO PHARMACY  IP CONSULT TO DIETITIAN  IP CONSULT TO PHARMACY  IP CONSULT TO NEPHROLOGY  IP CONSULT TO NEUROLOGY  IP CONSULT TO GENERAL SURGERY  IP CONSULT TO PALLIATIVE CARE  IP CONSULT TO HOSPICE  IP CONSULT TO HOSPICE      HPI:    The patient is a 84 y.o. female with pmhx of atrial fibrillation, COPD, chronic respiratory failure, CHF who presented to [facility] ED with complaint of tailbone pain and nausea. Pt is a poor historian.  Family at bedside and stated that she

## 2025-01-27 NOTE — CARE COORDINATION
Miriam BECKETT advised  that the family is going hospice.     CM spoke to Chrissy to ask if she could speak to the family.     Amanda from [facility] stopped by to see family.  Amanda stated she could not wait to find out if family could pre-sign documents (to prevent return trip to [place]). Amanda will check with family tomorrow.

## 2025-01-28 PROCEDURE — 1250000000 HC SEMI PRIVATE HOSPICE R&B

## 2025-01-28 PROCEDURE — 99222 1ST HOSP IP/OBS MODERATE 55: CPT | Performed by: INTERNAL MEDICINE

## 2025-01-28 PROCEDURE — 6360000002 HC RX W HCPCS: Performed by: INTERNAL MEDICINE

## 2025-01-28 PROCEDURE — 6370000000 HC RX 637 (ALT 250 FOR IP): Performed by: INTERNAL MEDICINE

## 2025-01-28 RX ADMIN — MORPHINE SULFATE 2 MG: 2 INJECTION, SOLUTION INTRAMUSCULAR; INTRAVENOUS at 10:57

## 2025-01-28 RX ADMIN — ATROPINE SULFATE 1 DROP: 10 SOLUTION/ DROPS OPHTHALMIC at 10:57

## 2025-01-28 RX ADMIN — ATROPINE SULFATE 1 DROP: 10 SOLUTION/ DROPS OPHTHALMIC at 16:42

## 2025-01-28 RX ADMIN — MORPHINE SULFATE 2 MG: 2 INJECTION, SOLUTION INTRAMUSCULAR; INTRAVENOUS at 16:42

## 2025-01-28 ASSESSMENT — PAIN SCALES - WONG BAKER
WONGBAKER_NUMERICALRESPONSE: NO HURT
WONGBAKER_NUMERICALRESPONSE: NO HURT

## 2025-01-28 NOTE — CONSULTS
Palliative Care Chart Review  and Check in Note:     NAME:  Terri Smith  Admit Date: 1/27/2025  Hospital Day:  Hospital Day: 2   Current Code status: DNR-CC    Palliative care is continuing to following Ms. Smith for symptom management,  and goals of care discussion as needed. Patient's chart reviewed today 1/28/25.        The following are the currently established goals/code status, and Symptom management.     Goals of care: pt's family is meeting with nurse Betty with HOC to sign consents this AM and plan continues for pt to GIP here with [facility] and possible transfer to their IPU.     Code status: DNR-CC    Discharge plan: Betty with [facility] is here now to get consents signed.      Chrissy yAers RN  01/28/25  8:39 AM

## 2025-01-28 NOTE — H&P
COLONOSCOPY N/A 12/16/2024    COLONOSCOPY POLYPECTOMY SNARE/BIOPSY performed by Calvin Lopez MD at [facility] ENDOSCOPY    CORONARY ARTERY BYPASS GRAFT N/A 09/19/2019    OFF PUMP CORONARY ARTERY BYPASS GRAFTING X2, INTERNAL MAMMARY ARTERY, SAPHENOUS VEIN GRAFT performed by Yolanda Chase MD at [facility] CVOR    ERCP N/A 1/7/2025    ENDOSCOPIC RETROGRADE CHOLANGIOPANCREATOGRAPHY SPHINCTER/PAPILLOTOMY performed by Celestine Lester MD at [facility] ENDOSCOPY    ERCP  1/7/2025    ENDOSCOPIC RETROGRADE CHOLANGIOPANCREATOGRAPHY STONE REMOVAL performed by Celestine Lester MD at [facility] ENDOSCOPY    IR MIDLINE CATH  09/20/2021    IR MIDLINE CATH 9/20/2021 [facility] [place]    MASTECTOMY, PARTIAL Left     OTHER SURGICAL HISTORY  01/07/2025    ERCP    SIGMOIDOSCOPY  02/27/2020    4 bands    SIGMOIDOSCOPY N/A 02/27/2020    FLEX SIG W/ BANDING SIGMOIDOSCOPY DIAGNOSTIC FLEXIBLE performed by Rowdy Schmitz DO at [facility] ENDOSCOPY    SIGMOIDOSCOPY N/A 08/31/2023    FLEX SIG. performed by Celestine Lester MD at [facility] ENDOSCOPY    UPPER GASTROINTESTINAL ENDOSCOPY N/A 01/09/2023    EGD DIAGNOSTIC ONLY performed by Darron Langford MD at [facility] ENDOSCOPY    UPPER GASTROINTESTINAL ENDOSCOPY N/A 01/13/2023    EGD BIOPSY performed by Darron Langford MD at [facility] ENDOSCOPY    UPPER GASTROINTESTINAL ENDOSCOPY  01/13/2023    EGD ESOPHAGOGASTRODUODENOSCOPY DILATATION performed by Darron Langford MD at [facility] ENDOSCOPY    UPPER GASTROINTESTINAL ENDOSCOPY N/A 02/05/2024    EGD BIOPSY performed by Celestine Lester MD at [facility] ENDOSCOPY    UPPER GASTROINTESTINAL ENDOSCOPY N/A 09/23/2024    ESOPHAGOGASTRODUODENOSCOPY performed by Calvin Lopez MD at [facility] ENDOSCOPY    UPPER GASTROINTESTINAL ENDOSCOPY  09/23/2024    ESOPHAGOGASTRODUODENOSCOPY DILATATION performed by Calvin Lopez MD at [facility] ENDOSCOPY    UPPER GASTROINTESTINAL ENDOSCOPY N/A 12/05/2024     Home

## 2025-01-29 VITALS
TEMPERATURE: 95.5 F | HEART RATE: 45 BPM | OXYGEN SATURATION: 85 % | SYSTOLIC BLOOD PRESSURE: 66 MMHG | DIASTOLIC BLOOD PRESSURE: 51 MMHG | RESPIRATION RATE: 27 BRPM

## 2025-01-29 PROCEDURE — 6360000002 HC RX W HCPCS: Performed by: INTERNAL MEDICINE

## 2025-01-29 PROCEDURE — 99232 SBSQ HOSP IP/OBS MODERATE 35: CPT | Performed by: INTERNAL MEDICINE

## 2025-01-29 RX ADMIN — ATROPINE SULFATE 1 DROP: 10 SOLUTION/ DROPS OPHTHALMIC at 06:56

## 2025-01-29 RX ADMIN — MORPHINE SULFATE 2 MG: 2 INJECTION, SOLUTION INTRAMUSCULAR; INTRAVENOUS at 07:00

## 2025-01-29 RX ADMIN — LORAZEPAM 1 MG: 2 INJECTION, SOLUTION INTRAMUSCULAR; INTRAVENOUS at 10:57

## 2025-01-29 ASSESSMENT — PAIN SCALES - GENERAL
PAINLEVEL_OUTOF10: 0
PAINLEVEL_OUTOF10: 4

## 2025-01-29 NOTE — PROGRESS NOTES
Bedside report given to Andrew BECKETT. POC transferred. SUJIT Montero    
Bedside report given to Kamila BECKETT. POC transferred. SUJIT Montero    
Family remains at bedside with pt.  Pt condition unchanged.  Family does not want pt repositioned or mouth swabbed at this time.   Family denies needs at this time.  Will continue to monitor.     
Family remains at bedside.  Denies needs at this time.   
Gave PRN morphine and atropine as pt RR 30s and gurgling, see MAR  
Medication ativan given per family request.   
No further change in pt condition.  Family remains at bedside.  Does not want pt turned at this time.  Mouth swabbed.    Family denies needs at this time.     
PT. Heart stopped at 1109 and verified by Calos BECKETT and Yumiko BECKETT. Family at bed side and hospice called. Dr. RUBIO and Hernan. Made aware.  
Patient assessment complete, oxygen on 2 liters NC, hospice patient, lung sounds rhonchi bilaterally, Bowel sounds hypoactive, SR on monitor.  BLE slightly mottled, toes blackened, patient drowsy, does move feet when touched, awakens when turned.  Urine output very low, large loose stool per rectum and leaking around tube, bed pad changed, stool black/tarry.  Weeping improving, left leg and arm weeping slightly.  Mouth care offered, family declines.  Offered to reposition, states breathes better when on back per family.  Denies needs, call light in reach of family, will continue to monitor.    
Patient hospice patient.  Family declining turns.     4 Eyes Skin Assessment     NAME:  Terri Smith  YOB: 1940  MEDICAL RECORD NUMBER:  [id number]    The patient is being assessed for  Other assessment    I agree that at least one RN has performed a thorough Head to Toe Skin Assessment on the patient. ALL assessment sites listed below have been assessed.      Areas assessed by both nurses:    Head, Face, Ears, Shoulders, Back, Chest, Arms, Elbows, Hands, Sacrum. Buttock, Coccyx, Ischium, Legs. Feet and Heels, and Under Medical Devices         Does the Patient have a Wound? Yes wound(s) were present on assessment. LDA wound assessment was Initiated and completed by RN    Multiple skin tears, Scattered bruising, scabbed abrasions, excoriation buttock.  Rectal tube and bond catheter in place.        Trey Prevention initiated by RN: Yes  Wound Care Orders initiated by RN: Yes    Pressure Injury (Stage 3,4, Unstageable, DTI, NWPT, and Complex wounds) if present, place Wound referral order by RN under : Yes    New Ostomies, if present place, Ostomy referral order under : No     Nurse 1 eSignature: Electronically signed by Gustavo Ba RN on 1/29/25 at 3:42 AM EST    **SHARE this note so that the co-signing nurse can place an eSignature**    Nurse 2 eSignature: {Esignature:[id number]}   
Patient rectal tube leaking small amount, bed pad changed, olivier care complete, SaO2 drops to 70% on 2 liters while turning patient.  Weeping skin improved, LLE upper thigh still weeping small amounts.  BLE toes blackened right greater than left.  No longer moving feet to stimulation.  Rhonchi bilaterally, orally suctioned, no gag reflex, atropine drops given, morphine given.  Family at bedside.  Will continue to monitor.    
Post-Mortuum Care completed.   
Pt family believes pt potentially in pain. Gave prn morphine per order, see MAR.   Pt also gurgling, gave prn atropine per order, see MAR.   
Pt weeping from extremities, gown wet. Linens around pt wet.  Family agrees to pt needs turned and changed. Pt pillows removed, facial grimacing.  Ativan given as ordered.  Complete CHG bath given to pt. Pads replaced under all extremities.   Will continue to monitor.   
Pt. Assessment complete family and pt. With no needs at this time will continue to monitor.   
Shift assessment completed.  Pt unresponsive except to pain.  Family at bedside.    Pt on 2 l/nc. Pt respiratory rate 30, has coarse, wet upper breath sounds. Family recently made pt Hospice care.    No needs from family at this time.   Will continue to monitor.   
Shift assessment, completed, see flow sheet. Pt is not interactive. No response to pain. Family at bedside.     , /53 (69), SpO2 99% on 2L/NC. Respirations are tachypenic, shallow. Bilateral lung sounds rhonchi. PEG in place, WNL, clamped. RIJ CVC, WNL, saline locked.    Bill in place and patent with STAT lock. B    Bed in lowest position. Bed alarm on.     
Transfer of care report given to Gerald JAUREGUI RN. Care transferred at this time.   
[facility]    Chart review and patient assessment. Met with Debbie Martinez and other family members at bedside to review HOC philosophy and services. Time spent answering questions and providing emotional support. Consent to hospice services signed per Daughter. Collaborating with Miriam BECKETT, Chrissy BECKETT, and ADDISON Adhikari. Thank you for the opportunity to serve this patient. Any questions or change in condition please call [phone].    Betty Loera RN   
(Formerly [facility]) 07/06/2023    Vaginal bleeding 07/06/2023    Uterine enlargement 07/05/2023    Myocardiopathy ([facility]) 05/08/2023    Acute on chronic systolic congestive heart failure ([facility]) 05/08/2023    PAF (paroxysmal atrial fibrillation) ([facility]) 05/08/2023    Chronic hypoxic respiratory failure 05/08/2023    Acute systolic CHF (congestive heart failure) ([facility]) 05/08/2023    Atrial fibrillation ([facility])     Electrolyte disorder     Tachycardia     Acute hypoxemic respiratory failure     Septic shock ([facility])     Bacteremia     Urinary tract infection without hematuria     Sepsis ([facility]) 03/30/2022    Hyperglycemia     Pleural effusion     HCAP (healthcare-associated pneumonia)     Severe anxiety     Severe protein-calorie malnutrition ([facility])     COPD, severe ([facility])     Anxiety     Acute respiratory failure with hypoxia and hypercarbia 09/28/2021    Acute kidney injury superimposed on CKD ([facility])     Elevated procalcitonin     Acute decompensated heart failure ([facility])     Chronic anxiety     Acute on chronic respiratory failure with hypoxemia 09/23/2021    Chronic obstructive pulmonary disease ([facility])     Acute on chronic respiratory failure with hypoxia and hypercapnia     Acute respiratory failure with hypoxia and hypercapnia 09/16/2021    Shock     Pneumonia due to infectious organism     Acute respiratory distress 09/09/2021    Volume overload 09/09/2021    Demand ischemia ([facility]) 09/09/2021    GERD (gastroesophageal reflux disease) 09/09/2021    Severe malnutrition ([facility]) 08/31/2021    Acute on chronic hypoxic respiratory failure 08/30/2021    Chronic respiratory failure with hypoxia     Primary hypertension     Anemia     Chest pain 09/18/2020    GI bleed 02/23/2020    Moderate malnutrition ([facility]) 09/25/2019    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

## 2025-01-29 NOTE — FLOWSHEET NOTE
01/29/25 0000   Vital Signs   Temp (!) 94.9 °F (34.9 °C)   Temp Source Bladder   Pulse 93   Heart Rate Source Monitor   Respirations 25   BP (!) 73/38   MAP (Calculated) 50   MAP (mmHg) (!) 50   BP Method Automatic   Critical Care Pain Observation Tool (CPOT)   Patient Ventilation Status Not Intubated   Oxygen Therapy   O2 Device Nasal cannula   O2 Flow Rate (L/min) 2 L/min     Family denies needs, at bedside, call light in reach, will continue to monitor.

## 2025-01-29 NOTE — PLAN OF CARE
Problem: Chronic Conditions and Co-morbidities  Goal: Patient's chronic conditions and co-morbidity symptoms are monitored and maintained or improved  Outcome: Progressing  Flowsheets (Taken 1/28/2025 2000)  Care Plan - Patient's Chronic Conditions and Co-Morbidity Symptoms are Monitored and Maintained or Improved: Monitor and assess patient's chronic conditions and comorbid symptoms for stability, deterioration, or improvement     Problem: Discharge Planning  Goal: Discharge to home or other facility with appropriate resources  Outcome: Progressing  Flowsheets (Taken 1/28/2025 2000)  Discharge to home or other facility with appropriate resources: Identify barriers to discharge with patient and caregiver     Problem: Safety - Medical Restraint  Goal: Remains free of injury from restraints (Restraint for Interference with Medical Device)  Description: INTERVENTIONS:  1. Determine that other, less restrictive measures have been tried or would not be effective before applying the restraint  2. Evaluate the patient's condition at the time of restraint application  3. Inform patient/family regarding the reason for restraint  4. Q2H: Monitor safety, psychosocial status, comfort, nutrition and hydration  Outcome: Progressing       Problem: Safety - Adult  Goal: Free from fall injury  Outcome: Progressing     Problem: Dyspnea Due to End of Life  Goal: Demonstrate understanding of and ability to manage respiratory symptoms at end of life  Description: Patient  and or family/caregiver will verbalize recall of breathing strategies to maintain an effective breathing pattern during the inpatient hospice stay.        Outcome: Progressing     Problem: Fever Due to End of Life  Goal: Demonstrate understanding of and ability to manage fever at end of life  Description: Patient and/or family/caregiver will verbalize recall the ability to manage fever at end of life during the inpatient hospice stay.  Outcome: Progressing

## 2025-01-31 NOTE — DISCHARGE SUMMARY
Name:  Terri Smith  Room:  3011/3011-01  MRN:    [id number]    Discharge Summary      This discharge summary is in conjunction with a complete physical exam done on the day of discharge.      Discharging Physician: LORENZO MCFARLAND MD      Admit: 1/27/2025  Discharge:  1/29/2025    Diagnoses this Admission    Principal Problem:    Acute CVA (cerebrovascular accident) (HCC)  Resolved Problems:    * No resolved hospital problems. *          Procedures (Please Review Full Report for Details)      Consults    IP CONSULT TO HOSPICE      HPI:  An 84-year-old female with a history of atrial fibrillation, chronic respiratory failure, CHF initially admitted for tailbone pain and nausea.  CT showed choledocholithiasis.  Underwent ERCP with stone removal.  Transferred to the ICU as she developed severe hypoxia and tachycardia with possible aspiration.  Placed on BiPAP.  Subsequently developed stroke.  Imaging showed acute/subacute ischemic stroke involving multivessel territory.  Her mental status however did not improve.  She continued to require BiPAP.  Family at this point decided comfort care and now enrolled in hospice.      Hospital Course        Under hospice care   Comfort care meds ordered.  Continue same   D/W family   Family not expressing any needs      DNR CC    Patient passed       No orders to display        LORENZO MCFARLAND MD 1/31/2025 1:22 PM

## 2025-02-04 LAB
ACID FAST STN SPEC QL: NORMAL
MYCOBACTERIUM SPEC CULT: NORMAL

## 2025-02-07 LAB
FUNGUS SPEC CULT: ABNORMAL
LOEFFLER MB STN SPEC: ABNORMAL
ORGANISM: ABNORMAL

## 2025-02-11 LAB
ACID FAST STN SPEC QL: NORMAL
MYCOBACTERIUM SPEC CULT: NORMAL

## 2025-02-18 LAB
ACID FAST STN SPEC QL: NORMAL
MYCOBACTERIUM SPEC CULT: NORMAL

## 2025-02-24 LAB
FUNGUS SPEC CULT: ABNORMAL
LOEFFLER MB STN SPEC: ABNORMAL
ORGANISM: ABNORMAL

## 2025-02-25 LAB
ACID FAST STN SPEC QL: NORMAL
MYCOBACTERIUM SPEC CULT: NORMAL

## 2025-03-04 LAB
ACID FAST STN SPEC QL: NORMAL
MYCOBACTERIUM SPEC CULT: NORMAL

## 2025-03-11 LAB
ACID FAST STN SPEC QL: NORMAL
MYCOBACTERIUM SPEC CULT: NORMAL

## (undated) DEVICE — 3M™ STERI-STRIP™ REINFORCED ADHESIVE SKIN CLOSURES, R1549, 1/2 IN X 2 IN (12 MM X 50 MM), 6 STRIPS/ENVELOPE: Brand: 3M™ STERI-STRIP™

## (undated) DEVICE — ELECTRODE,ECG,STRESS,FOAM,3PK: Brand: MEDLINE

## (undated) DEVICE — CANNULA,OXY,ADULT,SUPERSOFT,W/7'TUB,SC: Brand: MEDLINE INDUSTRIES, INC.

## (undated) DEVICE — Device

## (undated) DEVICE — SIX SHOOTER SAEED MULTI-BAND LIGATOR: Brand: SAEED

## (undated) DEVICE — SUTURE PROL SZ 6-0 L24IN NONABSORBABLE BLU L13MM C-1 3/8 8726H

## (undated) DEVICE — FORCEP BX STD CAP 240CM RAD JAW 4

## (undated) DEVICE — ENDOSCOPIC KIT 2 12 FT OP4 DE2 GWN SYR

## (undated) DEVICE — YANKAUER,BULB TIP,W/O VENT,RIGID,STERILE: Brand: MEDLINE

## (undated) DEVICE — CANNULA PERF AD 20FR L8.5IN ART 3/8IN CONN NVENT MTL TIP

## (undated) DEVICE — SNARE ENDOSCP AD L240CM LOOP W10MM SHTH DIA2.4MM RND INSUL

## (undated) DEVICE — FORCEPS BX L240CM JAW DIA2.8MM L CAP W/ NDL MIC MESH TOOTH

## (undated) DEVICE — CANNULA NSL AD TBNG L7FT PVC STR NONFLARED PRNG O2 DEL W STD

## (undated) DEVICE — ENDO CARRY-ON PROCEDURE KIT INCLUDES SUCTION TUBING, LUBRICANT, GAUZE, BIOHAZARD STICKER, TRANSPORT PAD AND INTERCEPT BEDSIDE KIT.: Brand: ENDO CARRY-ON PROCEDURE KIT

## (undated) DEVICE — ELECTRODE,RADIOTRANSLUCENT,FOAM,3PK: Brand: MEDLINE

## (undated) DEVICE — Z DISCONTINUED USE 2276105 GOWN PROTCT UNIV CHST W28IN L49IN SL 24IN BLU SPUNBOND FLM

## (undated) DEVICE — LIGHT HANDLE: Brand: DEVON

## (undated) DEVICE — CONMED SCOPE SAVER BITE BLOCK, 20X27 MM: Brand: SCOPE SAVER

## (undated) DEVICE — ELECTRODE ECG MONITR FOAM TEAR DROP ADLT RED

## (undated) DEVICE — CATHETER THOR L21IN DIA28FR R ANG SFT RADPQ STRP SIL

## (undated) DEVICE — KIT,ANTI FOG,W/SPONGE & FLUID,SOFT PACK: Brand: MEDLINE

## (undated) DEVICE — GOWN SIRUS NONREIN XL W/TWL: Brand: MEDLINE INDUSTRIES, INC.

## (undated) DEVICE — RETRACTOR SURG INSRT SUT HLD OCTOBASE

## (undated) DEVICE — KIT INF CTRL 2OZ LUB TBNG L12FT DBL END BRSH SYR OP4

## (undated) DEVICE — SUTURE NONABSORBABLE MONOFILAMENT 7-0 BV-1 1X24 IN PROLENE 8702H

## (undated) DEVICE — SUTURE ETHBND EXCEL SZ 0 L18IN NONABSORBABLE GRN L36MM CT-1 CX21D

## (undated) DEVICE — CHLORAPREP 26ML ORANGE

## (undated) DEVICE — TRAP POLYP ETRAP

## (undated) DEVICE — CANNULA PERF 7FR L5.5IN AORT ROOT RADPQ STD TIP W/ VENT LN

## (undated) DEVICE — 3M™ TEGADERM™ TRANSPARENT FILM DRESSING FRAME STYLE, 1624W, 2-3/8 IN X 2-3/4 IN (6 CM X 7 CM), 100/CT 4CT/CASE: Brand: 3M™ TEGADERM™

## (undated) DEVICE — STERILE LATEX POWDER-FREE SURGICAL GLOVESWITH NITRILE COATING: Brand: PROTEXIS

## (undated) DEVICE — CATHETER THORACENTHESIS 9 FRX20 IN EYES TOP

## (undated) DEVICE — SUTURE MCRYL + SZ 4-0 L18IN ABSRB UD L19MM PS-2 3/8 CIR MCP496G

## (undated) DEVICE — 3M™ TEGADERM™ TRANSPARENT FILM DRESSING FRAME STYLE, 1626W, 4 IN X 4-3/4 IN (10 CM X 12 CM), 50/CT 4CT/CASE: Brand: 3M™ TEGADERM™

## (undated) DEVICE — PACK PROCEDURE SURG OPN HRT A BASIC

## (undated) DEVICE — SUTURE ETHBND EXCEL SZ 2-0 L36IN NONABSORBABLE GRN SH-2 X559H

## (undated) DEVICE — KIT BLWR MISTER 5P 15L W/ TBNG SET IRRIG MIST TO IMPROVE

## (undated) DEVICE — DUODENOSCOPE RIGID CTRL STRL EXALT D DISP M00542423

## (undated) DEVICE — CANNULATING SPHINCTEROTOME: Brand: JAGTOME™ REVOLUTION RX

## (undated) DEVICE — BITE BLOCK ENDOSCP AD 60 FR W/ ADJ STRP PLAS GRN BLOX

## (undated) DEVICE — STABILIZER SURG FOR BEAT HRT SURG URCHIN EVO

## (undated) DEVICE — STABILIZER SURG TISS W/ CANSTR TBNG EVOLUTION OCTPS

## (undated) DEVICE — ENDOVIVE SFT PEG KIT PULL WENFIT 20F BX2

## (undated) DEVICE — SUTURE VCRL SZ 3-0 L27IN ABSRB UD L26MM SH 1/2 CIR J416H

## (undated) DEVICE — LAPAROSCOPIC VESSEL HARVESTING GAS CONDITIONING DEVICE: Brand: VESSEL GUARDIAN

## (undated) DEVICE — WAX SURG 2.5GM HEMSTAT BNE BEESWAX PARAFFIN ISO PALMITATE

## (undated) DEVICE — Z DISCONTINUED USE 2516375 APPLICATOR MEDICATED 3 CC CLR STRL CHLORAPREP

## (undated) DEVICE — AIRLIFE™ NASAL OXYGEN CANNULA CURVED, NONFLARED TIP, WITH 7' FEET (2.1 M) CRUSH RESISTANT TUBING, OVER-THE-EAR STYLE: Brand: AIRLIFE™

## (undated) DEVICE — ADHESIVE SKIN CLSR 0.7ML TOP DERMBND ADV

## (undated) DEVICE — PUNCH AORT DIA4MM LNG HNDL

## (undated) DEVICE — TRAP SPEC POLYPR SGL CHMBR FN MESH SCRN

## (undated) DEVICE — Z INACTIVE USE 2641838 CLIP INT L ORNG TI TRNSVRS GRV CHEVRON SHP W/ PRECIS TIP TO

## (undated) DEVICE — SUTURE NONABSORBABLE MONOFILAMENT 4-0 RB-1 36 IN BLU PROLENE 8557H

## (undated) DEVICE — SNARE ENDOSCP L240CM SHTH DIA24MM LOOP W10MM POLYP RND REINF

## (undated) DEVICE — Z DISCONTINUED USE 2270995 CATHETER URETH 16FR L16IN RED RUB INTMIT RND HLLW TIP 2 OPP

## (undated) DEVICE — BLADE SAW W10XL54MM FOR PRI REPEAT STRNOTMY

## (undated) DEVICE — RETRIEVAL BALLOON CATHETER: Brand: EXTRACTOR™ PRO RX

## (undated) DEVICE — SUTURE PDS II SZ 0 L36IN ABSRB VLT L36MM CT-1 1/2 CIR Z346H